# Patient Record
Sex: MALE | Race: WHITE | NOT HISPANIC OR LATINO | Employment: OTHER | URBAN - METROPOLITAN AREA
[De-identification: names, ages, dates, MRNs, and addresses within clinical notes are randomized per-mention and may not be internally consistent; named-entity substitution may affect disease eponyms.]

---

## 2017-01-05 ENCOUNTER — TELEPHONE (OUTPATIENT)
Dept: INTERNAL MEDICINE | Facility: CLINIC | Age: 67
End: 2017-01-05

## 2017-01-05 DIAGNOSIS — E87.5 INCREASED POTASSIUM IN THE BLOOD: Primary | ICD-10-CM

## 2017-01-05 NOTE — TELEPHONE ENCOUNTER
Results given to pt's wife and she said that they would go to the [facility] office for repeat BMP

## 2017-01-23 ENCOUNTER — TELEPHONE (OUTPATIENT)
Dept: INTERNAL MEDICINE | Facility: CLINIC | Age: 67
End: 2017-01-23

## 2017-01-23 NOTE — TELEPHONE ENCOUNTER
Orders are in for repeat BMP   Pt has been sick is the reason he did not go have the repeat done before this time

## 2017-01-23 NOTE — TELEPHONE ENCOUNTER
----- Message from Mary Davies sent at 1/23/2017  1:23 PM CST -----  Please call pt back about setting lab work. Pt got sick and was not able to get it. Please put the labs back in. Pt can be reach at [phone].

## 2017-01-23 NOTE — TELEPHONE ENCOUNTER
----- Message from Mary Davies sent at 1/23/2017  1:23 PM CST -----  Please call pt back about setting lab work. Pt got sick and was not able to get it. Please put the labs back in. Pt can be reach at [phone].

## 2017-01-25 ENCOUNTER — LAB VISIT (OUTPATIENT)
Dept: LAB | Facility: HOSPITAL | Age: 67
End: 2017-01-25
Attending: FAMILY MEDICINE
Payer: MEDICARE

## 2017-01-25 DIAGNOSIS — E87.5 INCREASED POTASSIUM IN THE BLOOD: ICD-10-CM

## 2017-01-25 LAB
ANION GAP SERPL CALC-SCNC: 11 MMOL/L
BUN SERPL-MCNC: 13 MG/DL
CALCIUM SERPL-MCNC: 8.8 MG/DL
CHLORIDE SERPL-SCNC: 106 MMOL/L
CO2 SERPL-SCNC: 19 MMOL/L
CREAT SERPL-MCNC: 1.3 MG/DL
EST. GFR  (AFRICAN AMERICAN): >60 ML/MIN/1.73 M^2
EST. GFR  (NON AFRICAN AMERICAN): 56.1 ML/MIN/1.73 M^2
GLUCOSE SERPL-MCNC: 106 MG/DL
POTASSIUM SERPL-SCNC: 4.1 MMOL/L
SODIUM SERPL-SCNC: 136 MMOL/L

## 2017-01-25 PROCEDURE — 80048 BASIC METABOLIC PNL TOTAL CA: CPT

## 2017-01-25 PROCEDURE — 36415 COLL VENOUS BLD VENIPUNCTURE: CPT | Mod: PO

## 2017-02-23 DIAGNOSIS — I10 ESSENTIAL HYPERTENSION: Chronic | ICD-10-CM

## 2017-02-23 RX ORDER — PRAVASTATIN SODIUM 80 MG/1
80 TABLET ORAL NIGHTLY
Qty: 90 TABLET | Refills: 3 | Status: SHIPPED | OUTPATIENT
Start: 2017-02-23 | End: 2018-03-30 | Stop reason: SDUPTHER

## 2017-02-23 RX ORDER — OMEPRAZOLE 20 MG/1
20 CAPSULE, DELAYED RELEASE ORAL 2 TIMES DAILY
Qty: 90 CAPSULE | Refills: 3 | Status: SHIPPED | OUTPATIENT
Start: 2017-02-23 | End: 2017-10-04 | Stop reason: SDUPTHER

## 2017-02-23 RX ORDER — CLOPIDOGREL BISULFATE 75 MG/1
TABLET ORAL
Qty: 90 TABLET | Refills: 3 | Status: SHIPPED | OUTPATIENT
Start: 2017-02-23 | End: 2018-04-06 | Stop reason: SDUPTHER

## 2017-02-23 RX ORDER — AMLODIPINE BESYLATE 5 MG/1
TABLET ORAL
Qty: 90 TABLET | Refills: 3 | Status: SHIPPED | OUTPATIENT
Start: 2017-02-23 | End: 2017-03-22 | Stop reason: SDUPTHER

## 2017-02-23 NOTE — TELEPHONE ENCOUNTER
----- Message from Emile Helms sent at 2/23/2017 10:54 AM CST -----  Pt is requesting a refill on all medications. ( [facility] )             Please call pt back at [phone]

## 2017-03-08 ENCOUNTER — OFFICE VISIT (OUTPATIENT)
Dept: INTERNAL MEDICINE | Facility: CLINIC | Age: 67
End: 2017-03-08
Payer: MEDICARE

## 2017-03-08 VITALS
BODY MASS INDEX: 28.25 KG/M2 | DIASTOLIC BLOOD PRESSURE: 84 MMHG | SYSTOLIC BLOOD PRESSURE: 150 MMHG | OXYGEN SATURATION: 96 % | TEMPERATURE: 98 F | HEIGHT: 73 IN | WEIGHT: 213.19 LBS | HEART RATE: 88 BPM

## 2017-03-08 DIAGNOSIS — N18.30 CKD (CHRONIC KIDNEY DISEASE) STAGE 3, GFR 30-59 ML/MIN: Chronic | ICD-10-CM

## 2017-03-08 DIAGNOSIS — I10 ESSENTIAL HYPERTENSION: Primary | Chronic | ICD-10-CM

## 2017-03-08 DIAGNOSIS — I73.9 PVD (PERIPHERAL VASCULAR DISEASE): Chronic | ICD-10-CM

## 2017-03-08 DIAGNOSIS — E78.2 MIXED HYPERLIPIDEMIA: ICD-10-CM

## 2017-03-08 DIAGNOSIS — Z89.511 STATUS POST BELOW KNEE AMPUTATION OF RIGHT LOWER EXTREMITY: ICD-10-CM

## 2017-03-08 DIAGNOSIS — Z96.0 URETERAL STENT RETAINED: Chronic | ICD-10-CM

## 2017-03-08 PROCEDURE — 1126F AMNT PAIN NOTED NONE PRSNT: CPT | Mod: S$GLB,,, | Performed by: FAMILY MEDICINE

## 2017-03-08 PROCEDURE — 3077F SYST BP >= 140 MM HG: CPT | Mod: S$GLB,,, | Performed by: FAMILY MEDICINE

## 2017-03-08 PROCEDURE — 3079F DIAST BP 80-89 MM HG: CPT | Mod: S$GLB,,, | Performed by: FAMILY MEDICINE

## 2017-03-08 PROCEDURE — 1160F RVW MEDS BY RX/DR IN RCRD: CPT | Mod: S$GLB,,, | Performed by: FAMILY MEDICINE

## 2017-03-08 PROCEDURE — 99214 OFFICE O/P EST MOD 30 MIN: CPT | Mod: S$GLB,,, | Performed by: FAMILY MEDICINE

## 2017-03-08 PROCEDURE — 1157F ADVNC CARE PLAN IN RCRD: CPT | Mod: S$GLB,,, | Performed by: FAMILY MEDICINE

## 2017-03-08 PROCEDURE — 1159F MED LIST DOCD IN RCRD: CPT | Mod: S$GLB,,, | Performed by: FAMILY MEDICINE

## 2017-03-08 PROCEDURE — 99999 PR PBB SHADOW E&M-EST. PATIENT-LVL III: CPT | Mod: PBBFAC,,, | Performed by: FAMILY MEDICINE

## 2017-03-08 PROCEDURE — 99499 UNLISTED E&M SERVICE: CPT | Mod: S$GLB,,, | Performed by: FAMILY MEDICINE

## 2017-03-08 RX ORDER — LOSARTAN POTASSIUM 50 MG/1
50 TABLET ORAL DAILY
Qty: 90 TABLET | Refills: 3 | Status: SHIPPED | OUTPATIENT
Start: 2017-03-08 | End: 2017-03-22 | Stop reason: SDUPTHER

## 2017-03-08 NOTE — MR AVS SNAPSHOT
[facility]  [address] [id number]  Phone: [phone]                  Kodi Ribeiro   3/8/2017 1:20 PM   Office Visit    Description:  Male : 1950   Provider:  Norma Nuñez MD   Department:  [facility]           Reason for Visit     Rash           Diagnoses this Visit        Comments    Essential hypertension    -  Primary     Mixed hyperlipidemia         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         Toe amputation status, left         Ureteral stent retained         PVD (peripheral vascular disease)         CKD (chronic kidney disease) stage 3, GFR 30-59 ml/min                To Do List           Future Appointments        Provider Department Dept Phone    3/22/2017 10:40 AM Norma Nuñez MD [facility] Internal Medicine [phone]    2017 10:00 AM Scooter Jin IV, MD [facility] - Urology [phone]      Goals (5 Years of Data)     None      Follow-Up and Disposition     Return in about 2 weeks (around 3/22/2017).       These Medications        Disp Refills Start End    losartan (COZAAR) 50 MG tablet 90 tablet 3 3/8/2017 3/8/2018    Take 1 tablet (50 mg total) by mouth once daily. - Oral    [facility] [address] Ph #: [phone]         [facility] On Call     [facility] On Call Nurse Care Line -  Assistance  Registered nurses in the [facility] On Call Center provide clinical advisement, health education, appointment booking, and other advisory services.  Call for this free service at [phone].             Medications           Message regarding Medications     Verify the changes and/or additions to your medication regime listed below are the same as discussed with your clinician today.  If any of these changes or additions are incorrect, please notify your healthcare provider.        START taking these NEW medications        Refills     "losartan (COZAAR) 50 MG tablet 3    Sig: Take 1 tablet (50 mg total) by mouth once daily.    Class: Normal    Route: Oral           Verify that the below list of medications is an accurate representation of the medications you are currently taking.  If none reported, the list may be blank. If incorrect, please contact your healthcare provider. Carry this list with you in case of emergency.           Current Medications     amlodipine (NORVASC) 5 MG tablet TAKE 1 TABLET ONE TIME DAILY    betamethasone dipropionate (DIPROLENE) 0.05 % cream AAA bid as needed.  For rash on forearms.  Do not use on face, underarms or groin area    carvedilol (COREG) 6.25 MG tablet Take 1 tablet (6.25 mg total) by mouth 2 (two) times daily.    clopidogrel (PLAVIX) 75 mg tablet TAKE 1 TABLET ONE TIME DAILY    docusate sodium (COLACE) 100 MG capsule Take 1 capsule (100 mg total) by mouth 2 (two) times daily.    hydrocodone-acetaminophen 7.5-325mg (NORCO) 7.5-325 mg per tablet Take 1 tablet by mouth every 8 (eight) hours as needed for Pain.    omeprazole (PRILOSEC) 20 MG capsule Take 1 capsule (20 mg total) by mouth 2 (two) times daily.    pravastatin (PRAVACHOL) 80 MG tablet Take 1 tablet (80 mg total) by mouth every evening.    triamcinolone acetonide 0.025% (KENALOG) 0.025 % cream Apply topically 2 (two) times daily.    triamcinolone acetonide 0.1% (KENALOG) 0.1 % cream 1 application 2 (two) times daily. Apply to affected area    losartan (COZAAR) 50 MG tablet Take 1 tablet (50 mg total) by mouth once daily.           Clinical Reference Information           Your Vitals Were     BP Pulse Temp Height Weight SpO2    150/84 88 98.4 °F (36.9 °C) (Tympanic) 6' 1" (1.854 m) 96.7 kg (213 lb 3 oz) 96%    BMI                28.13 kg/m2          Blood Pressure          Most Recent Value    BP  (!)  150/84      Allergies as of 3/8/2017     Morphine      Immunizations Administered on Date of Encounter - 3/8/2017     None      Language Assistance " Services     ATTENTION: Language assistance services are available, free of charge. Please call [phone].      ATENCIÓN: Si habla ashvin, tiene a morocho disposición servicios gratuitos de asistencia lingüística. Llame al [phone].     CHÚ Ý: N?u b?n nói Ti?ng Vi?t, có các d?ch v? h? tr? ngôn ng? mi?n phí dành cho b?n. G?i s? [phone].         [facility] complies with applicable Federal civil rights laws and does not discriminate on the basis of race, color, national origin, age, disability, or sex.

## 2017-03-08 NOTE — PROGRESS NOTES
Chief Complaint:    Chief Complaint   Patient presents with    Rash       History of Present Illness:    Will straight for six-month follow-up,  He has hypertension which is running high today no home blood pressure readings available.  Also has some itching on bilateral forearms been using hydrocortisone cream  Hyperlipidemia on pravastatin last labs done in December were normal.  Also has chronic kidney disease stable.    ROS:  Review of Systems   Constitutional: Negative for activity change, chills, fatigue, fever and unexpected weight change.   HENT: Negative for congestion, ear discharge, ear pain, hearing loss, postnasal drip and rhinorrhea.    Eyes: Negative for pain and visual disturbance.   Respiratory: Negative for cough, chest tightness and shortness of breath.    Cardiovascular: Negative for chest pain and palpitations.   Gastrointestinal: Negative for abdominal pain, diarrhea and vomiting.   Endocrine: Negative for heat intolerance.   Genitourinary: Negative for dysuria, flank pain, frequency and hematuria.   Musculoskeletal: Negative for back pain, gait problem and neck pain.   Skin: Negative for color change and rash.   Neurological: Negative for dizziness, tremors, seizures, numbness and headaches.   Psychiatric/Behavioral: Negative for agitation, hallucinations, self-injury, sleep disturbance and suicidal ideas. The patient is not nervous/anxious.        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    Jejunal polyp     VCE 3/7/2014    Lipoma of colon     colonoscopy 2/21/2014    Myocardial infarction     per patient 2000 &  "9/2012    Phantom limb syndrome     patient reports only intermittent not problematic, not worsening    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    Tobacco dependence     resolved    Ulcer of ileum     VCE 3/7/2014    Ureteral stent retained        Social History:  Social History     Social History    Marital status:      Spouse name: Laury    Number of children: 2    Years of education: N/A     Occupational History    Retired       [employer]     Social History Main Topics    Smoking status: Former Smoker     Packs/day: 1.00     Years: 15.00     Quit date: 1/1/2009    Smokeless tobacco: Never Used    Alcohol use No    Drug use: No    Sexual activity: Not Currently     Partners: Female     Other Topics Concern    None     Social History Narrative    None       Family History:   family history includes Cancer in his mother; Diabetes in his daughter; Heart disease in his father. There is no history of Eczema, Lupus, Psoriasis, Melanoma, Kidney disease, or Stroke.    Health Maintenance   Topic Date Due    Lipid Panel  12/29/2017    Colonoscopy  02/21/2019    TETANUS VACCINE  12/04/2024    Hepatitis C Screening  Completed    Zoster Vaccine  Completed    Pneumococcal (65+)  Completed    Influenza Vaccine  Completed    Abdominal Aortic Aneurysm Screening  Addressed       Physical Exam:    Vital Signs  Temp: 98.4 °F (36.9 °C)  Temp src: Tympanic  Pulse: 88  SpO2: 96 %  BP: (!) 150/84  Pain Score: 0-No pain  Height and Weight  Height: 6' 1" (185.4 cm)  Weight: 96.7 kg (213 lb 3 oz)  BSA (Calculated - sq m): 2.23 sq meters  BMI (Calculated): 28.2  Weight in (lb) to have BMI = 25: 189.1]    Body mass index is 28.13 kg/(m^2).    Physical Exam   Constitutional: He is oriented to person, place, and time. He appears well-developed.   HENT:   Mouth/Throat: Oropharynx is clear and moist.   Eyes: Conjunctivae are normal. Pupils are equal, round, and reactive to light. "   Neck: Normal range of motion. Neck supple.   Cardiovascular: Normal rate, regular rhythm and normal heart sounds.    No murmur heard.  Pulmonary/Chest: Effort normal and breath sounds normal. No respiratory distress. He has no wheezes. He has no rales. He exhibits no tenderness.   Abdominal: Soft. He exhibits no distension and no mass. There is no tenderness. There is no guarding.   Musculoskeletal: He exhibits no edema or tenderness.   Lymphadenopathy:     He has no cervical adenopathy.   Neurological: He is alert and oriented to person, place, and time. He has normal reflexes.   Skin: Skin is warm and dry.        Psychiatric: He has a normal mood and affect. His behavior is normal. Judgment and thought content normal.       Lab Results   Component Value Date    CHOL 141 12/29/2016    CHOL 168 12/04/2014    CHOL 178 05/30/2014    TRIG 214 (H) 12/29/2016    TRIG 149 12/04/2014    TRIG 108 05/30/2014    HDL 29 (L) 12/29/2016    HDL 33 (L) 12/04/2014    HDL 41 05/30/2014    TOTALCHOLEST 4.9 12/29/2016    TOTALCHOLEST 5.1 (H) 12/04/2014    TOTALCHOLEST 4.3 05/30/2014    NONHDLCHOL 112 12/29/2016    NONHDLCHOL 135 12/04/2014    NONHDLCHOL 137 05/30/2014       Lab Results   Component Value Date    HGBA1C 5.6 06/23/2016       Assessment:      ICD-10-CM ICD-9-CM   1. Essential hypertension I10 401.9   2. Mixed hyperlipidemia E78.2 272.2   3. 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 Z89.511 V49.75   4. Toe amputation status, left Z89.422 V49.72   5. Ureteral stent retained Z96.0 V43.89   6. PVD (peripheral vascular disease) I73.9 443.9   7. CKD (chronic kidney disease) stage 3, GFR 30-59 ml/min N18.3 585.3         Plan:  Hypertension poor control and add losartan 50 mg once a day continue other medications moderate blood pressure carefully.  Hyperlipidemia stable continue current meds.  Ureteral stent placement will follow-up with Dr. Jin next month for possible stent change.  Chronic kidney disease  stable  Peripheral vascular disease asymptomatic.  Hyperlipidemia stable.  No orders of the defined types were placed in this encounter.      Current Outpatient Prescriptions   Medication Sig Dispense Refill    amlodipine (NORVASC) 5 MG tablet TAKE 1 TABLET ONE TIME DAILY 90 tablet 3    betamethasone dipropionate (DIPROLENE) 0.05 % cream AAA bid as needed.  For rash on forearms.  Do not use on face, underarms or groin area 60 g 3    carvedilol (COREG) 6.25 MG tablet Take 1 tablet (6.25 mg total) by mouth 2 (two) times daily. (Patient taking differently: Take 6.25 mg by mouth 2 (two) times daily. Pt takes 1/2 (6.25 mg) tab in am and 1/2 in pm  ) 180 tablet 3    clopidogrel (PLAVIX) 75 mg tablet TAKE 1 TABLET ONE TIME DAILY 90 tablet 3    docusate sodium (COLACE) 100 MG capsule Take 1 capsule (100 mg total) by mouth 2 (two) times daily. 60 capsule 0    hydrocodone-acetaminophen 7.5-325mg (NORCO) 7.5-325 mg per tablet Take 1 tablet by mouth every 8 (eight) hours as needed for Pain. 21 tablet 0    omeprazole (PRILOSEC) 20 MG capsule Take 1 capsule (20 mg total) by mouth 2 (two) times daily. 90 capsule 3    pravastatin (PRAVACHOL) 80 MG tablet Take 1 tablet (80 mg total) by mouth every evening. 90 tablet 3    triamcinolone acetonide 0.025% (KENALOG) 0.025 % cream Apply topically 2 (two) times daily. 80 g 1    triamcinolone acetonide 0.1% (KENALOG) 0.1 % cream 1 application 2 (two) times daily. Apply to affected area  0    losartan (COZAAR) 50 MG tablet Take 1 tablet (50 mg total) by mouth once daily. 90 tablet 3     No current facility-administered medications for this visit.        There are no discontinued medications.    Return in about 2 weeks (around 3/22/2017).      Dr Norma Nuñez MD    Disclaimer: This note is prepared using voice recognition system and as such is likely to have errors and is not proof read.

## 2017-03-22 ENCOUNTER — OFFICE VISIT (OUTPATIENT)
Dept: INTERNAL MEDICINE | Facility: CLINIC | Age: 67
End: 2017-03-22
Payer: MEDICARE

## 2017-03-22 VITALS
SYSTOLIC BLOOD PRESSURE: 158 MMHG | HEIGHT: 73 IN | TEMPERATURE: 98 F | BODY MASS INDEX: 27.87 KG/M2 | OXYGEN SATURATION: 96 % | WEIGHT: 210.31 LBS | HEART RATE: 82 BPM | DIASTOLIC BLOOD PRESSURE: 78 MMHG

## 2017-03-22 DIAGNOSIS — I10 ESSENTIAL HYPERTENSION: Primary | ICD-10-CM

## 2017-03-22 PROCEDURE — 99213 OFFICE O/P EST LOW 20 MIN: CPT | Mod: S$GLB,,, | Performed by: FAMILY MEDICINE

## 2017-03-22 PROCEDURE — 1160F RVW MEDS BY RX/DR IN RCRD: CPT | Mod: S$GLB,,, | Performed by: FAMILY MEDICINE

## 2017-03-22 PROCEDURE — 3078F DIAST BP <80 MM HG: CPT | Mod: S$GLB,,, | Performed by: FAMILY MEDICINE

## 2017-03-22 PROCEDURE — 1126F AMNT PAIN NOTED NONE PRSNT: CPT | Mod: S$GLB,,, | Performed by: FAMILY MEDICINE

## 2017-03-22 PROCEDURE — 1159F MED LIST DOCD IN RCRD: CPT | Mod: S$GLB,,, | Performed by: FAMILY MEDICINE

## 2017-03-22 PROCEDURE — 99499 UNLISTED E&M SERVICE: CPT | Mod: S$GLB,,, | Performed by: FAMILY MEDICINE

## 2017-03-22 PROCEDURE — 1157F ADVNC CARE PLAN IN RCRD: CPT | Mod: S$GLB,,, | Performed by: FAMILY MEDICINE

## 2017-03-22 PROCEDURE — 99999 PR PBB SHADOW E&M-EST. PATIENT-LVL III: CPT | Mod: PBBFAC,,, | Performed by: FAMILY MEDICINE

## 2017-03-22 PROCEDURE — 3074F SYST BP LT 130 MM HG: CPT | Mod: S$GLB,,, | Performed by: FAMILY MEDICINE

## 2017-03-22 RX ORDER — LOSARTAN POTASSIUM 100 MG/1
100 TABLET ORAL DAILY
Qty: 90 TABLET | Refills: 3
Start: 2017-03-22 | End: 2017-04-21 | Stop reason: SDUPTHER

## 2017-03-22 RX ORDER — AMLODIPINE BESYLATE 10 MG/1
TABLET ORAL
Qty: 30 TABLET | Refills: 6
Start: 2017-03-22 | End: 2017-04-21 | Stop reason: SDUPTHER

## 2017-03-22 NOTE — MR AVS SNAPSHOT
[facility] Medicine  [address] [place] [address]  Phone: [phone]                  Kodi Ribeiro   3/22/2017 10:40 AM   Office Visit    Description:  Male : 1950   Provider:  Norma Nuñez MD   Department:  [facility]           Reason for Visit     Follow-up           Diagnoses this Visit        Comments    Essential hypertension    -  Primary            To Do List           Future Appointments        Provider Department Dept Phone    2017 10:00 AM MD [facility] - Urology [phone]    2017 10:20 AM Norma Nuñez MD [facility] Medicine [phone]      Goals (5 Years of Data)     None      Follow-Up and Disposition     Return in about 1 month (around 2017).    Follow-up and Disposition History       These Medications        Disp Refills Start End    losartan (COZAAR) 100 MG tablet 90 tablet 3 3/22/2017 3/22/2018    Take 1 tablet (100 mg total) by mouth once daily. - Oral    Pharmacy: [address] Ph #: [phone]       amlodipine (NORVASC) 10 MG tablet 30 tablet 6 3/22/2017     TAKE 1 TABLET ONE TIME DAILY    Pharmacy: [facility] Ph #: [phone]         [facility] On Call     [facility] On Call Nurse Care Line -  Assistance  Registered nurses in the [facility] On Call Center provide clinical advisement, health education, appointment booking, and other advisory services.  Call for this free service at [phone].             Medications           Message regarding Medications     Verify the changes and/or additions to your medication regime listed below are the same as discussed with your clinician today.  If any of these changes or additions are incorrect, please notify your healthcare provider.        CHANGE how you are taking these medications     Start Taking Instead of     losartan (COZAAR) 100 MG tablet losartan (COZAAR) 50 MG tablet    Dosage:  Take 1 tablet (100 mg total) by mouth once daily. Dosage:  Take 1 tablet (50 mg total) by mouth once daily.    Reason for Change:  Reorder     amlodipine (NORVASC) 10 MG tablet amlodipine (NORVASC) 5 MG tablet    Dosage:  TAKE 1 TABLET ONE TIME DAILY Dosage:  TAKE 1 TABLET ONE TIME DAILY    Reason for Change:  Reorder            Verify that the below list of medications is an accurate representation of the medications you are currently taking.  If none reported, the list may be blank. If incorrect, please contact your healthcare provider. Carry this list with you in case of emergency.           Current Medications     amlodipine (NORVASC) 10 MG tablet TAKE 1 TABLET ONE TIME DAILY    betamethasone dipropionate (DIPROLENE) 0.05 % cream AAA bid as needed.  For rash on forearms.  Do not use on face, underarms or groin area    carvedilol (COREG) 6.25 MG tablet Take 1 tablet (6.25 mg total) by mouth 2 (two) times daily.    clopidogrel (PLAVIX) 75 mg tablet TAKE 1 TABLET ONE TIME DAILY    docusate sodium (COLACE) 100 MG capsule Take 1 capsule (100 mg total) by mouth 2 (two) times daily.    hydrocodone-acetaminophen 7.5-325mg (NORCO) 7.5-325 mg per tablet Take 1 tablet by mouth every 8 (eight) hours as needed for Pain.    losartan (COZAAR) 100 MG tablet Take 1 tablet (100 mg total) by mouth once daily.    omeprazole (PRILOSEC) 20 MG capsule Take 1 capsule (20 mg total) by mouth 2 (two) times daily.    pravastatin (PRAVACHOL) 80 MG tablet Take 1 tablet (80 mg total) by mouth every evening.    triamcinolone acetonide 0.025% (KENALOG) 0.025 % cream Apply topically 2 (two) times daily.    triamcinolone acetonide 0.1% (KENALOG) 0.1 % cream 1 application 2 (two) times daily. Apply to affected area           Clinical Reference Information           Your Vitals Were     BP Pulse Temp Height    158/78 (BP Location: Right arm, Patient Position: Sitting, BP  "Method: Manual) 82 98.3 °F (36.8 °C) (Tympanic) 6' 1" (1.854 m)    Weight SpO2 BMI    95.4 kg (210 lb 5.1 oz) 96% 27.75 kg/m2      Blood Pressure          Most Recent Value    BP  (!)  158/78      Allergies as of 3/22/2017     Morphine      Immunizations Administered on Date of Encounter - 3/22/2017     None      Language Assistance Services     ATTENTION: Language assistance services are available, free of charge. Please call [phone].      ATENCIÓN: Si habla español, tiene a morocho disposición servicios gratuitos de asistencia lingüística. Llame al [phone].     LINH Ý: N?u b?n nói Ti?ng Vi?t, có các d?ch v? h? tr? ngôn ng? mi?n phí dành cho b?n. G?i s? [phone].         [facility] complies with applicable Federal civil rights laws and does not discriminate on the basis of race, color, national origin, age, disability, or sex.        "

## 2017-03-22 NOTE — PROGRESS NOTES
Chief Complaint:    Chief Complaint   Patient presents with    Follow-up       History of Present Illness:    Patient is a for follow-up of blood pressure  He was started on losartan  Patient says his blood pressure still running high check with his machine and his machine tends to read systolic blood pressure 10 points lower than our machine but still the systolic is 150+ on the left arm.  He is taking Coreg 6.25 mg but he only takes half a pill amlodipine 5 mg and losartan 50 mg.    ROS:  Review of Systems   Constitutional: Negative for activity change, chills, fatigue, fever and unexpected weight change.   HENT: Negative for congestion, ear discharge, ear pain, hearing loss, postnasal drip and rhinorrhea.    Eyes: Negative for pain and visual disturbance.   Respiratory: Negative for cough, chest tightness and shortness of breath.    Cardiovascular: Negative for chest pain and palpitations.   Gastrointestinal: Negative for abdominal pain, diarrhea and vomiting.   Endocrine: Negative for heat intolerance.   Genitourinary: Negative for dysuria, flank pain, frequency and hematuria.   Musculoskeletal: Negative for back pain, gait problem and neck pain.   Skin: Negative for color change and rash.   Neurological: Negative for dizziness, tremors, seizures, numbness and headaches.   Psychiatric/Behavioral: Negative for agitation, hallucinations, self-injury, sleep disturbance and suicidal ideas. The patient is not nervous/anxious.        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    Jejunal polyp     VCE 3/7/2014    Lipoma of colon      "colonoscopy 2/21/2014    Myocardial infarction     per patient 2000 & 9/2012    Phantom limb syndrome     patient reports only intermittent not problematic, not worsening    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    Tobacco dependence     resolved    Ulcer of ileum     VCE 3/7/2014    Ureteral stent retained        Social History:  Social History     Social History    Marital status:      Spouse name: Laury    Number of children: 2    Years of education: N/A     Occupational History    Retired       [employer]     Social History Main Topics    Smoking status: Former Smoker     Packs/day: 1.00     Years: 15.00     Quit date: 1/1/2009    Smokeless tobacco: Never Used    Alcohol use No    Drug use: No    Sexual activity: Not Currently     Partners: Female     Other Topics Concern    None     Social History Narrative       Family History:   family history includes Cancer in his mother; Diabetes in his daughter; Heart disease in his father. There is no history of Eczema, Lupus, Psoriasis, Melanoma, Kidney disease, or Stroke.    Health Maintenance   Topic Date Due    Lipid Panel  12/29/2017    Colonoscopy  02/21/2019    TETANUS VACCINE  12/04/2024    Hepatitis C Screening  Completed    Zoster Vaccine  Completed    Pneumococcal (65+)  Completed    Influenza Vaccine  Completed    Abdominal Aortic Aneurysm Screening  Addressed       Physical Exam:    Vital Signs  Temp: 98.3 °F (36.8 °C)  Temp src: Tympanic  Pulse: 82  SpO2: 96 %  BP: (!) 158/78  BP Location: Right arm  BP Method: Manual  Patient Position: Sitting  Pain Score: 0-No pain  Height and Weight  Height: 6' 1" (185.4 cm)  Weight: 95.4 kg (210 lb 5.1 oz)  BSA (Calculated - sq m): 2.22 sq meters  BMI (Calculated): 27.8  Weight in (lb) to have BMI = 25: 189.1]    Body mass index is 27.75 kg/(m^2).    Physical Exam   Constitutional: He appears well-developed.   Eyes: Pupils are equal, round, and reactive to " light.   Cardiovascular: Normal rate.    Pulmonary/Chest: Effort normal.   Abdominal: Soft.   Skin: Skin is warm.       Lab Results   Component Value Date    CHOL 141 12/29/2016    CHOL 168 12/04/2014    CHOL 178 05/30/2014    TRIG 214 (H) 12/29/2016    TRIG 149 12/04/2014    TRIG 108 05/30/2014    HDL 29 (L) 12/29/2016    HDL 33 (L) 12/04/2014    HDL 41 05/30/2014    TOTALCHOLEST 4.9 12/29/2016    TOTALCHOLEST 5.1 (H) 12/04/2014    TOTALCHOLEST 4.3 05/30/2014    NONHDLCHOL 112 12/29/2016    NONHDLCHOL 135 12/04/2014    NONHDLCHOL 137 05/30/2014       Lab Results   Component Value Date    HGBA1C 5.6 06/23/2016       Assessment:      ICD-10-CM ICD-9-CM   1. Essential hypertension I10 401.9         Plan:  We'll increase the losartan to 100 mg, amlodipine to 10 mg and Coreg to 6.25 twice a day.  Please continue to monitor blood pressure readings and follow up with numbers.  No orders of the defined types were placed in this encounter.      Current Outpatient Prescriptions   Medication Sig Dispense Refill    amlodipine (NORVASC) 10 MG tablet TAKE 1 TABLET ONE TIME DAILY 30 tablet 6    betamethasone dipropionate (DIPROLENE) 0.05 % cream AAA bid as needed.  For rash on forearms.  Do not use on face, underarms or groin area 60 g 3    carvedilol (COREG) 6.25 MG tablet Take 1 tablet (6.25 mg total) by mouth 2 (two) times daily. (Patient taking differently: Take 6.25 mg by mouth 2 (two) times daily. Pt takes 1/2 (6.25 mg) tab in am and 1/2 in pm  ) 180 tablet 3    clopidogrel (PLAVIX) 75 mg tablet TAKE 1 TABLET ONE TIME DAILY 90 tablet 3    docusate sodium (COLACE) 100 MG capsule Take 1 capsule (100 mg total) by mouth 2 (two) times daily. 60 capsule 0    hydrocodone-acetaminophen 7.5-325mg (NORCO) 7.5-325 mg per tablet Take 1 tablet by mouth every 8 (eight) hours as needed for Pain. 21 tablet 0    losartan (COZAAR) 100 MG tablet Take 1 tablet (100 mg total) by mouth once daily. 90 tablet 3    omeprazole (PRILOSEC) 20  MG capsule Take 1 capsule (20 mg total) by mouth 2 (two) times daily. 90 capsule 3    pravastatin (PRAVACHOL) 80 MG tablet Take 1 tablet (80 mg total) by mouth every evening. 90 tablet 3    triamcinolone acetonide 0.025% (KENALOG) 0.025 % cream Apply topically 2 (two) times daily. 80 g 1    triamcinolone acetonide 0.1% (KENALOG) 0.1 % cream 1 application 2 (two) times daily. Apply to affected area  0     No current facility-administered medications for this visit.        Medications Discontinued During This Encounter   Medication Reason    losartan (COZAAR) 50 MG tablet Reorder    amlodipine (NORVASC) 5 MG tablet Reorder       Return in about 1 month (around 4/22/2017).      Dr Norma Nuñez MD    Disclaimer: This note is prepared using voice recognition system and as such is likely to have errors and is not proof read.

## 2017-04-21 ENCOUNTER — OFFICE VISIT (OUTPATIENT)
Dept: INTERNAL MEDICINE | Facility: CLINIC | Age: 67
End: 2017-04-21
Payer: MEDICARE

## 2017-04-21 VITALS
WEIGHT: 211.44 LBS | OXYGEN SATURATION: 97 % | SYSTOLIC BLOOD PRESSURE: 112 MMHG | BODY MASS INDEX: 28.02 KG/M2 | HEART RATE: 72 BPM | TEMPERATURE: 98 F | HEIGHT: 73 IN | DIASTOLIC BLOOD PRESSURE: 60 MMHG

## 2017-04-21 DIAGNOSIS — I10 ESSENTIAL HYPERTENSION: Primary | Chronic | ICD-10-CM

## 2017-04-21 PROCEDURE — 3074F SYST BP LT 130 MM HG: CPT | Mod: S$GLB,,, | Performed by: FAMILY MEDICINE

## 2017-04-21 PROCEDURE — 1160F RVW MEDS BY RX/DR IN RCRD: CPT | Mod: S$GLB,,, | Performed by: FAMILY MEDICINE

## 2017-04-21 PROCEDURE — 99999 PR PBB SHADOW E&M-EST. PATIENT-LVL III: CPT | Mod: PBBFAC,,, | Performed by: FAMILY MEDICINE

## 2017-04-21 PROCEDURE — 99499 UNLISTED E&M SERVICE: CPT | Mod: S$GLB,,, | Performed by: FAMILY MEDICINE

## 2017-04-21 PROCEDURE — 1126F AMNT PAIN NOTED NONE PRSNT: CPT | Mod: S$GLB,,, | Performed by: FAMILY MEDICINE

## 2017-04-21 PROCEDURE — 3078F DIAST BP <80 MM HG: CPT | Mod: S$GLB,,, | Performed by: FAMILY MEDICINE

## 2017-04-21 PROCEDURE — 1157F ADVNC CARE PLAN IN RCRD: CPT | Mod: S$GLB,,, | Performed by: FAMILY MEDICINE

## 2017-04-21 PROCEDURE — 1159F MED LIST DOCD IN RCRD: CPT | Mod: S$GLB,,, | Performed by: FAMILY MEDICINE

## 2017-04-21 PROCEDURE — 99213 OFFICE O/P EST LOW 20 MIN: CPT | Mod: S$GLB,,, | Performed by: FAMILY MEDICINE

## 2017-04-21 RX ORDER — CARVEDILOL 6.25 MG/1
6.25 TABLET ORAL 2 TIMES DAILY
Qty: 180 TABLET | Refills: 3 | Status: SHIPPED | OUTPATIENT
Start: 2017-04-21 | End: 2018-05-31 | Stop reason: SDUPTHER

## 2017-04-21 RX ORDER — AMLODIPINE BESYLATE 10 MG/1
TABLET ORAL
Qty: 90 TABLET | Refills: 3 | Status: SHIPPED | OUTPATIENT
Start: 2017-04-21 | End: 2018-05-10 | Stop reason: SDUPTHER

## 2017-04-21 RX ORDER — LOSARTAN POTASSIUM 100 MG/1
100 TABLET ORAL DAILY
Qty: 90 TABLET | Refills: 3 | Status: SHIPPED | OUTPATIENT
Start: 2017-04-21 | End: 2018-05-10 | Stop reason: SDUPTHER

## 2017-04-21 NOTE — PROGRESS NOTES
Chief Complaint:    Chief Complaint   Patient presents with    Follow-up       History of Present Illness:    Is here for follow-up of his blood pressure readings.  We admitted some medicine changes last time.  He brings his home readings, blood pressure is for the most part systolic 130/63.  No side effects to the medication.    ROS:  Review of Systems   Constitutional: Negative for activity change, chills, fatigue, fever and unexpected weight change.   HENT: Negative for congestion, ear discharge, ear pain, hearing loss, postnasal drip and rhinorrhea.    Eyes: Negative for pain and visual disturbance.   Respiratory: Negative for cough, chest tightness and shortness of breath.    Cardiovascular: Negative for chest pain and palpitations.   Gastrointestinal: Negative for abdominal pain, diarrhea and vomiting.   Endocrine: Negative for heat intolerance.   Genitourinary: Negative for dysuria, flank pain, frequency and hematuria.   Musculoskeletal: Negative for back pain, gait problem and neck pain.   Skin: Negative for color change and rash.   Neurological: Negative for dizziness, tremors, seizures, numbness and headaches.   Psychiatric/Behavioral: Negative for agitation, hallucinations, self-injury, sleep disturbance and suicidal ideas. The patient is not nervous/anxious.        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    Jejunal polyp     VCE 3/7/2014    Lipoma of colon     colonoscopy 2/21/2014    Myocardial infarction     per patient 2000 & 9/2012    Phantom limb syndrome     patient reports only intermittent not problematic, not  "worsening    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    Tobacco dependence     resolved    Ulcer of ileum     VCE 3/7/2014    Ureteral stent retained        Social History:  Social History     Social History    Marital status:      Spouse name: Laury    Number of children: 2    Years of education: N/A     Occupational History    Retired       [employer]     Social History Main Topics    Smoking status: Former Smoker     Packs/day: 1.00     Years: 15.00     Quit date: 1/1/2009    Smokeless tobacco: Never Used    Alcohol use No    Drug use: No    Sexual activity: Not Currently     Partners: Female     Other Topics Concern    None     Social History Narrative       Family History:   family history includes Cancer in his mother; Diabetes in his daughter; Heart disease in his father. There is no history of Eczema, Lupus, Psoriasis, Melanoma, Kidney disease, or Stroke.    Health Maintenance   Topic Date Due    Lipid Panel  12/29/2017    Colonoscopy  02/21/2019    TETANUS VACCINE  12/04/2024    Hepatitis C Screening  Completed    Zoster Vaccine  Completed    Pneumococcal (65+)  Completed    Influenza Vaccine  Completed    Abdominal Aortic Aneurysm Screening  Addressed       Physical Exam:    Vital Signs  Temp: 97.6 °F (36.4 °C)  Temp src: Tympanic  Pulse: 72  SpO2: 97 %  BP: 112/60  BP Location: Left arm  BP Method: Manual  Patient Position: Sitting  Pain Score: 0-No pain  Height and Weight  Height: 6' 1" (185.4 cm)  Weight: 95.9 kg (211 lb 6.7 oz)  BSA (Calculated - sq m): 2.22 sq meters  BMI (Calculated): 28  Weight in (lb) to have BMI = 25: 189.1]    Body mass index is 27.89 kg/(m^2).    Physical Exam   Constitutional: He appears well-developed.   Cardiovascular: Normal rate and regular rhythm.    Abdominal: Soft.       Lab Results   Component Value Date    CHOL 141 12/29/2016    CHOL 168 12/04/2014    CHOL 178 05/30/2014    TRIG 214 (H) 12/29/2016    TRIG 149 " 12/04/2014    TRIG 108 05/30/2014    HDL 29 (L) 12/29/2016    HDL 33 (L) 12/04/2014    HDL 41 05/30/2014    TOTALCHOLEST 4.9 12/29/2016    TOTALCHOLEST 5.1 (H) 12/04/2014    TOTALCHOLEST 4.3 05/30/2014    NONHDLCHOL 112 12/29/2016    NONHDLCHOL 135 12/04/2014    NONHDLCHOL 137 05/30/2014       Lab Results   Component Value Date    HGBA1C 5.6 06/23/2016       Assessment:      ICD-10-CM ICD-9-CM   1. Essential hypertension I10 401.9         Plan:  Good response to the medication.  Please continue current plan medicine sent to mail order  No orders of the defined types were placed in this encounter.      Current Outpatient Prescriptions   Medication Sig Dispense Refill    amlodipine (NORVASC) 10 MG tablet TAKE 1 TABLET ONE TIME DAILY 90 tablet 3    betamethasone dipropionate (DIPROLENE) 0.05 % cream AAA bid as needed.  For rash on forearms.  Do not use on face, underarms or groin area 60 g 3    carvedilol (COREG) 6.25 MG tablet Take 1 tablet (6.25 mg total) by mouth 2 (two) times daily. 180 tablet 3    clopidogrel (PLAVIX) 75 mg tablet TAKE 1 TABLET ONE TIME DAILY 90 tablet 3    docusate sodium (COLACE) 100 MG capsule Take 1 capsule (100 mg total) by mouth 2 (two) times daily. 60 capsule 0    hydrocodone-acetaminophen 7.5-325mg (NORCO) 7.5-325 mg per tablet Take 1 tablet by mouth every 8 (eight) hours as needed for Pain. 21 tablet 0    losartan (COZAAR) 100 MG tablet Take 1 tablet (100 mg total) by mouth once daily. 90 tablet 3    omeprazole (PRILOSEC) 20 MG capsule Take 1 capsule (20 mg total) by mouth 2 (two) times daily. 90 capsule 3    pravastatin (PRAVACHOL) 80 MG tablet Take 1 tablet (80 mg total) by mouth every evening. 90 tablet 3    triamcinolone acetonide 0.025% (KENALOG) 0.025 % cream Apply topically 2 (two) times daily. 80 g 1    triamcinolone acetonide 0.1% (KENALOG) 0.1 % cream 1 application 2 (two) times daily. Apply to affected area  0     No current facility-administered medications for this  visit.        Medications Discontinued During This Encounter   Medication Reason    amlodipine (NORVASC) 10 MG tablet Reorder    losartan (COZAAR) 100 MG tablet Reorder    carvedilol (COREG) 6.25 MG tablet Reorder       Return in about 6 months (around 10/21/2017).      Dr Norma Nuñez MD    Disclaimer: This note is prepared using voice recognition system and as such is likely to have errors and is not proof read.

## 2017-04-24 ENCOUNTER — HOSPITAL ENCOUNTER (OUTPATIENT)
Dept: RADIOLOGY | Facility: HOSPITAL | Age: 67
Discharge: HOME | End: 2017-04-24
Attending: UROLOGY
Payer: MEDICARE

## 2017-04-24 ENCOUNTER — TELEPHONE (OUTPATIENT)
Dept: INTERNAL MEDICINE | Facility: CLINIC | Age: 67
End: 2017-04-24

## 2017-04-24 ENCOUNTER — OFFICE VISIT (OUTPATIENT)
Dept: UROLOGY | Facility: CLINIC | Age: 67
End: 2017-04-24
Payer: MEDICARE

## 2017-04-24 VITALS
SYSTOLIC BLOOD PRESSURE: 120 MMHG | BODY MASS INDEX: 27.65 KG/M2 | WEIGHT: 209.56 LBS | DIASTOLIC BLOOD PRESSURE: 69 MMHG | HEART RATE: 80 BPM

## 2017-04-24 DIAGNOSIS — N13.5 URETERAL STRICTURE, LEFT: Primary | ICD-10-CM

## 2017-04-24 DIAGNOSIS — I25.10 CORONARY ARTERY DISEASE, ANGINA PRESENCE UNSPECIFIED, UNSPECIFIED VESSEL OR LESION TYPE, UNSPECIFIED WHETHER NATIVE OR TRANSPLANTED HEART: Primary | ICD-10-CM

## 2017-04-24 PROCEDURE — 99499 UNLISTED E&M SERVICE: CPT | Mod: S$GLB,,, | Performed by: UROLOGY

## 2017-04-24 PROCEDURE — 71020 XR CHEST PA AND LATERAL: CPT | Mod: 26,,, | Performed by: RADIOLOGY

## 2017-04-24 PROCEDURE — 3074F SYST BP LT 130 MM HG: CPT | Mod: S$GLB,,, | Performed by: UROLOGY

## 2017-04-24 PROCEDURE — 3078F DIAST BP <80 MM HG: CPT | Mod: S$GLB,,, | Performed by: UROLOGY

## 2017-04-24 PROCEDURE — 99999 PR PBB SHADOW E&M-EST. PATIENT-LVL IV: CPT | Mod: PBBFAC,,, | Performed by: UROLOGY

## 2017-04-24 PROCEDURE — 1160F RVW MEDS BY RX/DR IN RCRD: CPT | Mod: S$GLB,,, | Performed by: UROLOGY

## 2017-04-24 PROCEDURE — 99214 OFFICE O/P EST MOD 30 MIN: CPT | Mod: S$GLB,,, | Performed by: UROLOGY

## 2017-04-24 PROCEDURE — 1159F MED LIST DOCD IN RCRD: CPT | Mod: S$GLB,,, | Performed by: UROLOGY

## 2017-04-24 PROCEDURE — 93000 ELECTROCARDIOGRAM COMPLETE: CPT | Mod: S$GLB,,, | Performed by: NUCLEAR MEDICINE

## 2017-04-24 PROCEDURE — 1126F AMNT PAIN NOTED NONE PRSNT: CPT | Mod: S$GLB,,, | Performed by: UROLOGY

## 2017-04-24 PROCEDURE — 71020 XR CHEST PA AND LATERAL: CPT | Mod: TC

## 2017-04-24 NOTE — PROGRESS NOTES
Chief Complaint: Left ureteral stricture with stent    HPI:   4/24/17: Due for left stent change, will arrange.  4/22/16: No changes in last year feeling very well.  Here to replace left stent.  Says he is doing great.  4/22/15: Doing fine with stent change. Stent had no problems after a year; RTC 3/1/16 to plan for next change.   3/19/15: Pt had his stent replaced 1/2014 but has not been back since.  He had major surgery for bowel abscess in the months after he saw me with a lot of vascular reconstruction.  Here to discuss replacement of his left ureteral stent.  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  1/17/14: Pt returns for f/u.  Recent CT shows stent still in place with no urolithiasis.  He has had a fem-fem bypass and his legs are working much better; he felt so good.    10/2012: Pt was found to have left ureteral stricture secondary to AAA repair. A stent was placed in May and then replaced by me last week. He has a long stricture of the middle ureter, such that a reimplant would require a psoas hitch/boari flap reaching to the upper ureter. This is complicated by the patient having a horseshoe kidney so nephropexy with proximal mobilization would be impractical. He is tolerating the stent well and is practically unaware of its presence.  >>> he was to have Mag3 and  f/u from this appt in 5 mo for stent replacement but did not return, Mag3 showed improved drainage with stent..... <<<  9/2012: About 3 months ago pt had severe left kidney infection and had an MI during the same period. He was diagnosed as having a left ureteral obstruction at the level of the iliac crest. A left ureteral stent was placed. He had a Mag3 study prior to stent placement that showed normal uptake bilaterally, normal right and delayed left excretion (44 min). Recent MI caused some damage but no further stenting or CABG indicated now. No history of kidney stones. Had a full body scan in 2008 that showed left hydroureter and  had a stent placed at that time and it was removed 8 months later with no plan for followup. Current stent has been in place for two months. No LUTS. Pain 0/10. Had an episode of gross hematuria one time last week.    Allergies:  Morphine    Medications:  has a current medication list which includes the following prescription(s): amlodipine, betamethasone dipropionate, carvedilol, clopidogrel, docusate sodium, hydrocodone-acetaminophen 7.5-325mg, losartan, omeprazole, pravastatin, and triamcinolone acetonide 0.1%.    Review of Systems:  General: No fever, chills, fatigability, or weight loss.  Skin: No rashes, itching, or changes in color or texture of skin.  Chest: Denies MACKEY, cyanosis, wheezing, cough, and sputum production.  Abdomen: Appetite fine. No weight loss. Denies diarrhea, abdominal pain, hematemesis, or blood in stool.  Musculoskeletal: No joint stiffness or swelling. Denies back pain.  : As above.  All other review of systems negative.    PMH:   has a past medical history of Anemia; Colon polyp; Colon polyp (6/22/2015); Diverticulosis; Encounter for blood transfusion; GERD (gastroesophageal reflux disease); Hemorrhoids; Horseshoe kidney; Hyperglycemia (3/17/2014); Infection of aortic graft (3/14/2014); Jejunal polyp; Lipoma of colon; Myocardial infarction; Phantom limb syndrome; S/P aorto-bifemoral bypass surgery (3/17/2014); Tobacco dependence; Ulcer of ileum; and Ureteral stent retained.    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    FamHx: family history includes Cancer in his mother;  Diabetes in his daughter; Heart disease in his father. There is no history of Eczema, Lupus, Psoriasis, Melanoma, Kidney disease, or Stroke.    SocHx:  reports that he quit smoking about 8 years ago. He has a 15.00 pack-year smoking history. He has never used smokeless tobacco. He reports that he does not drink alcohol or use illicit drugs.      Physical Exam:  Vitals:    04/24/17 1001   BP: 120/69   Pulse: 80     General: A&Ox3, no apparent distress, no deformities  Neck: No masses, normal thyroid  Abdomen: Soft, NT, ND  Skin: The skin is warm and dry. No jaundice.  Ext: No c/c/e.  : deferred    Labs/Studies:   PSA    11/13: 0.5    12/14: 0.4  Urinalysis performed in clinic, summary: UA normal    Impression/Plan:   1. Doing well with stent and stent did well for over a year; will replace on one year intervals.  Will schedule for next week.

## 2017-04-24 NOTE — TELEPHONE ENCOUNTER
He has 3 stents in his heart and he has been on it for greater than 5 years , he says that Dr Boone is his cardiologist now but he did not start him on the medication

## 2017-04-24 NOTE — TELEPHONE ENCOUNTER
----- Message from Josie Malhotra LPN sent at 4/24/2017 10:35 AM CDT -----  Good morning!   Dr. Jin has scheduled patient for cystoscopy with stent placement on 5/4/17. He is currently on Plavix 75mg and will need surgery clearance from Dr. Nuñez stating when he may discontinue the blood thinner prior to surgery. He last saw Dr. Nuñez on 4/21/17. Could Dr. Nuñez addend his note on 4/21/17 or make a new note if he feels patient is a good candidate for surgery?   Thank you for your help,  CORBIN Galindo

## 2017-04-24 NOTE — TELEPHONE ENCOUNTER
Please call patient to find out who put him on Plavix and how long he has been on it.  I do need to know the answer soon because he is having surgery.

## 2017-04-24 NOTE — MR AVS SNAPSHOT
[facility]  [address] [address]  Phone: [phone]  Fax: [phone]                  Kodi Ribeiro   2017 10:00 AM   Office Visit    Description:  Male : 1950   Provider:  Scooter Jin IV, MD   Department:  [facility]           Diagnoses this Visit        Comments    Ureteral stricture, left    -  Primary            To Do List           Future Appointments        Provider Department Dept Phone    2017 10:30 AM EKG, [facility] CARDIO [facility] [phone]    2017 11:00 AM LAB, SAME DAY [facility] [phone]    2017 11:45 AM ONLH XR1- [facility] [phone]    2017 1:00 PM Scooter Jin IV, MD [facility] [phone]    10/23/2017 10:20 AM Norma Nuñez MD [facility] [phone]      Your Future Surgeries/Procedures     May 04, 2017   Surgery with Scooter Jin IV, MD   [facility]    [address] [address] [phone]              Goals (5 Years of Data)     None      [facility]     [facility] Nurse Care Line -  Assistance  Unless otherwise directed by your provider, please contact [facility], our nurse care line that is available for  assistance.     Registered nurses in the [facility] provide: appointment scheduling, clinical advisement, health education, and other advisory services.  Call: [phone] (toll free)               Medications           Message regarding Medications     Verify the changes and/or additions to your medication regime listed below are the same as discussed with your clinician today.  If any of these changes or additions are incorrect, please notify your healthcare provider.        STOP taking these medications     docusate sodium (COLACE) 100 MG capsule Take 1 capsule (100 mg  total) by mouth 2 (two) times daily.    hydrocodone-acetaminophen 7.5-325mg (NORCO) 7.5-325 mg per tablet Take 1 tablet by mouth every 8 (eight) hours as needed for Pain.           Verify that the below list of medications is an accurate representation of the medications you are currently taking.  If none reported, the list may be blank. If incorrect, please contact your healthcare provider. Carry this list with you in case of emergency.           Current Medications     amlodipine (NORVASC) 10 MG tablet TAKE 1 TABLET ONE TIME DAILY    betamethasone dipropionate (DIPROLENE) 0.05 % cream AAA bid as needed.  For rash on forearms.  Do not use on face, underarms or groin area    carvedilol (COREG) 6.25 MG tablet Take 1 tablet (6.25 mg total) by mouth 2 (two) times daily.    clopidogrel (PLAVIX) 75 mg tablet TAKE 1 TABLET ONE TIME DAILY    losartan (COZAAR) 100 MG tablet Take 1 tablet (100 mg total) by mouth once daily.    omeprazole (PRILOSEC) 20 MG capsule Take 1 capsule (20 mg total) by mouth 2 (two) times daily.    pravastatin (PRAVACHOL) 80 MG tablet Take 1 tablet (80 mg total) by mouth every evening.    triamcinolone acetonide 0.1% (KENALOG) 0.1 % cream 1 application 2 (two) times daily. Apply to affected area           Clinical Reference Information           Your Vitals Were     BP Pulse Weight BMI       120/69 (BP Location: Right arm, Patient Position: Sitting, BP Method: Automatic) 80 95.1 kg (209 lb 8.8 oz) 27.65 kg/m2       Blood Pressure          Most Recent Value    BP  120/69      Allergies as of 4/24/2017     Morphine      Immunizations Administered on Date of Encounter - 4/24/2017     None      Orders Placed During Today's Visit      Normal Orders This Visit    Case Request Operating Room: CYSTOSCOPY WITH STENT PLACEMENT     Future Labs/Procedures Expected by Expires    Basic metabolic panel  4/24/2017 6/23/2018    CBC auto differential  4/24/2017 6/23/2018      Language Assistance Services      ATTENTION: Language assistance services are available, free of charge. Please call [phone].      ATENCIÓN: Si habla vamshiañol, tiene a morocho disposición servicios gratuitos de asistencia lingüística. Llame al [phone].     CHÚ Ý: N?u b?n nói Ti?ng Vi?t, có các d?ch v? h? tr? ngôn ng? mi?n phí dành cho b?n. G?i s? [phone].         [facility] complies with applicable Federal civil rights laws and does not discriminate on the basis of race, color, national origin, age, disability, or sex.

## 2017-04-25 NOTE — TELEPHONE ENCOUNTER
Norma    I cannot give any formal recommendations on this patient as he is no longer under my care as he has not been seen in > 3 years.    Adriel

## 2017-04-25 NOTE — TELEPHONE ENCOUNTER
He still says you are his cardiologist. I mino ask him to see you, this will probably delay his surgery though.  Thanks.  rosy Gomes;  Please let pt know that he needs to see Dr. Boone before he will give his formal recommendations about plavix.  Thanks

## 2017-04-26 NOTE — TELEPHONE ENCOUNTER
----- Message from Italia Mario sent at 4/26/2017  1:52 PM CDT -----  Contact: self  Returning your call. Please call back at  [phone]

## 2017-04-27 ENCOUNTER — OFFICE VISIT (OUTPATIENT)
Dept: CARDIOLOGY | Facility: CLINIC | Age: 67
End: 2017-04-27
Payer: MEDICARE

## 2017-04-27 VITALS
HEIGHT: 73 IN | WEIGHT: 209.44 LBS | HEART RATE: 76 BPM | BODY MASS INDEX: 27.76 KG/M2 | DIASTOLIC BLOOD PRESSURE: 70 MMHG | SYSTOLIC BLOOD PRESSURE: 146 MMHG

## 2017-04-27 DIAGNOSIS — E78.2 MIXED HYPERLIPIDEMIA: Chronic | ICD-10-CM

## 2017-04-27 DIAGNOSIS — I10 ESSENTIAL HYPERTENSION: Chronic | ICD-10-CM

## 2017-04-27 DIAGNOSIS — I73.9 PVD (PERIPHERAL VASCULAR DISEASE): Chronic | ICD-10-CM

## 2017-04-27 DIAGNOSIS — I25.2 OLD MI (MYOCARDIAL INFARCTION): Chronic | ICD-10-CM

## 2017-04-27 DIAGNOSIS — Z01.810 PREOP CARDIOVASCULAR EXAM: ICD-10-CM

## 2017-04-27 DIAGNOSIS — I25.10 CORONARY ARTERY DISEASE INVOLVING NATIVE CORONARY ARTERY OF NATIVE HEART WITHOUT ANGINA PECTORIS: Primary | Chronic | ICD-10-CM

## 2017-04-27 PROCEDURE — 99499 UNLISTED E&M SERVICE: CPT | Mod: S$GLB,,, | Performed by: INTERNAL MEDICINE

## 2017-04-27 PROCEDURE — 3078F DIAST BP <80 MM HG: CPT | Mod: S$GLB,,, | Performed by: INTERNAL MEDICINE

## 2017-04-27 PROCEDURE — 1126F AMNT PAIN NOTED NONE PRSNT: CPT | Mod: S$GLB,,, | Performed by: INTERNAL MEDICINE

## 2017-04-27 PROCEDURE — 99204 OFFICE O/P NEW MOD 45 MIN: CPT | Mod: S$GLB,,, | Performed by: INTERNAL MEDICINE

## 2017-04-27 PROCEDURE — 99999 PR PBB SHADOW E&M-EST. PATIENT-LVL III: CPT | Mod: PBBFAC,,, | Performed by: INTERNAL MEDICINE

## 2017-04-27 PROCEDURE — 1159F MED LIST DOCD IN RCRD: CPT | Mod: S$GLB,,, | Performed by: INTERNAL MEDICINE

## 2017-04-27 PROCEDURE — 3077F SYST BP >= 140 MM HG: CPT | Mod: S$GLB,,, | Performed by: INTERNAL MEDICINE

## 2017-04-27 PROCEDURE — 1160F RVW MEDS BY RX/DR IN RCRD: CPT | Mod: S$GLB,,, | Performed by: INTERNAL MEDICINE

## 2017-04-27 NOTE — MR AVS SNAPSHOT
[facility] - Cardiology  [address] [facility] [address]  [facility] [address]  Phone: [phone]  Fax: [phone]                  Kodi ELIAS Gema   2017 11:00 AM   Office Visit    Description:  Male : 1950   Provider:  Edenilson Beckman MD   Department:  [facility] - Cardiology           Reason for Visit     Pre-op Exam     Hypertension     Peripheral Vascular Disease           Diagnoses this Visit        Comments    Coronary artery disease involving native coronary artery of native heart without angina pectoris    -  Primary     Preop cardiovascular exam         Essential hypertension         PVD (peripheral vascular disease)         Old MI (myocardial infarction)         Mixed hyperlipidemia                To Do List           Future Appointments        Provider Department Dept Phone    2017 1:00 PM Scooter Jin IV, MD [facility] Urology [phone]    10/23/2017 10:20 AM Norma Nuñez MD [facility] [phone]      Your Future Surgeries/Procedures     May 04, 2017   Surgery with Scooter Jin IV, MD   [facility]  ([facility])    [id number] [place] [id number] [phone]              Goals (5 Years of Data)     None      Follow-Up and Disposition     Return in about 6 months (around 10/27/2017), or if symptoms worsen or fail to improve.      [facility]     [facility] Nurse Care Line - 24 Assistance  Unless otherwise directed by your provider, please contact [facility], our nurse care line that is available for  assistance.     Registered nurses in the [facility] provide: appointment scheduling, clinical advisement, health education, and other advisory services.  Call: [phone] (toll free)               Medications           Message regarding Medications     Verify the changes and/or additions to your medication regime listed below are the same as discussed with your clinician today.  If  "any of these changes or additions are incorrect, please notify your healthcare provider.             Verify that the below list of medications is an accurate representation of the medications you are currently taking.  If none reported, the list may be blank. If incorrect, please contact your healthcare provider. Carry this list with you in case of emergency.           Current Medications     amlodipine (NORVASC) 10 MG tablet TAKE 1 TABLET ONE TIME DAILY    betamethasone dipropionate (DIPROLENE) 0.05 % cream AAA bid as needed.  For rash on forearms.  Do not use on face, underarms or groin area    carvedilol (COREG) 6.25 MG tablet Take 1 tablet (6.25 mg total) by mouth 2 (two) times daily.    clopidogrel (PLAVIX) 75 mg tablet TAKE 1 TABLET ONE TIME DAILY    losartan (COZAAR) 100 MG tablet Take 1 tablet (100 mg total) by mouth once daily.    omeprazole (PRILOSEC) 20 MG capsule Take 1 capsule (20 mg total) by mouth 2 (two) times daily.    pravastatin (PRAVACHOL) 80 MG tablet Take 1 tablet (80 mg total) by mouth every evening.    triamcinolone acetonide 0.1% (KENALOG) 0.1 % cream 1 application 2 (two) times daily. Apply to affected area           Clinical Reference Information           Your Vitals Were     BP Pulse Height Weight BMI    146/70 (BP Location: Left arm, Patient Position: Sitting, BP Method: Manual) 76 6' 1" (1.854 m) 95 kg (209 lb 7 oz) 27.63 kg/m2      Blood Pressure          Most Recent Value    BP  (!)  146/70      Allergies as of 4/27/2017     Morphine      Immunizations Administered on Date of Encounter - 4/27/2017     None      Language Assistance Services     ATTENTION: Language assistance services are available, free of charge. Please call [phone].      ATENCIÓN: Si habla ashvin, tiene a morocho disposición servicios gratuitos de asistencia lingüística. Llame al [phone].     CHÚ Ý: N?u b?n nói Ti?ng Vi?t, có các d?ch v? h? tr? ngôn ng? mi?n phí dành cho b?n. G?i s? [phone].         " [facility] complies with applicable Federal civil rights laws and does not discriminate on the basis of race, color, national origin, age, disability, or sex.

## 2017-04-27 NOTE — PROGRESS NOTES
Subjective:   Patient ID:  Kodi Ribeiro is a 67 y.o. male who presents for evaluation of Pre-op Exam (ref Dr Jin for kidney procedure); Hypertension; and Peripheral Vascular Disease      HPI Comments: 68 yo, male, came in for preop clearance of kidney stent procedure.  PMH CAD s/p PCI stents x3 in 2000,  f/u with Dr. Boone in 2014. AAA s/p graft infection and aorto-bifemoral bypass [id number], Left kidney stents s/p multiple removal and replacement. Last one was one yr ago.  03/2014 Normal EF.  EKG on 04/27 NSR.  He denied chest pain, dyspnea on exertion, palpitation, fainting, PND, orthopnea, syncope and claudication.   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    Jejunal polyp     VCE 3/7/2014    Lipoma of colon     colonoscopy 2/21/2014    Myocardial infarction     per patient 2000 & 9/2012    Phantom limb syndrome     patient reports only intermittent not problematic, not worsening    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    Tobacco dependence     resolved    Ulcer of ileum     VCE 3/7/2014    Ureteral stent retained        Past Surgical History:   Procedure Laterality Date    ABDOMINAL AORTIC ANEURYSM REPAIR      ABDOMINAL AORTIC ANEURYSM REPAIR  1996/2014    AORTA - BILATERAL FEMORAL ARTERY BYPASS GRAFT  2014    Left and right leg    CORONARY ANGIOPLASTY WITH STENT PLACEMENT  2000    Three placed in heart    FOOT AMPUTATION THROUGH METATARSAL  1996    left    FOOT SURGERY      per patient multiple toe amputations prior to partial foot amputation    KIDNEY SURGERY   2014    per patient separation of horseshoe kidney @ time of AAA repair    LUNG LOBECTOMY Right 1970s    per patient not cancer    right below knee amputation  2009 (approx)    SMALL INTESTINE SURGERY  2014    per patient partial @ time of aaa repair    TONSILLECTOMY      URETERAL STENT PLACEMENT         Social History   Substance Use Topics    Smoking status: Former Smoker     Packs/day: 1.00     Years: 15.00     Quit date: 1/1/2009    Smokeless tobacco: Never Used    Alcohol use No       Family History   Problem Relation Age of Onset    Cancer Mother      lung    Heart disease Father      MI but per patient bc of old age    Diabetes Daughter     Eczema Neg Hx     Lupus Neg Hx     Psoriasis Neg Hx     Melanoma Neg Hx     Kidney disease Neg Hx     Stroke Neg Hx        Review of Systems   Constitution: Negative for decreased appetite, diaphoresis, fever, weakness, malaise/fatigue and night sweats.   HENT: Negative for headaches and nosebleeds.    Eyes: Negative for blurred vision and double vision.   Cardiovascular: Negative for chest pain, claudication, dyspnea on exertion, irregular heartbeat, leg swelling, near-syncope, orthopnea, palpitations, paroxysmal nocturnal dyspnea and syncope.   Respiratory: Negative for cough, shortness of breath, sleep disturbances due to breathing, snoring, sputum production and wheezing.    Endocrine: Negative for cold intolerance and polyuria.   Hematologic/Lymphatic: Does not bruise/bleed easily.   Skin: Negative for rash.   Musculoskeletal: Negative for back pain, falls, joint pain, joint swelling and neck pain.   Gastrointestinal: Negative for abdominal pain, heartburn, nausea and vomiting.   Genitourinary: Negative for dysuria, frequency and hematuria.   Neurological: Negative for difficulty with concentration, dizziness, focal weakness, light-headedness, numbness and seizures.   Psychiatric/Behavioral: Negative for depression, memory loss and substance abuse.  The patient does not have insomnia.    Allergic/Immunologic: Negative for HIV exposure and hives.       Objective:   Physical Exam   Constitutional: He is oriented to person, place, and time. He appears well-nourished.   HENT:   Head: Normocephalic.   Eyes: Pupils are equal, round, and reactive to light.   Neck: Normal carotid pulses and no JVD present. Carotid bruit is not present. No thyromegaly present.   Cardiovascular: Normal rate, regular rhythm, normal heart sounds and normal pulses.   No extrasystoles are present. PMI is not displaced.  Exam reveals no gallop and no S3.    No murmur heard.  Pulmonary/Chest: Breath sounds normal. No stridor. No respiratory distress.   Abdominal: Soft. Bowel sounds are normal. There is no tenderness. There is no rebound.   Musculoskeletal: Normal range of motion.   Right BKA  Left foot amputation   Neurological: He is alert and oriented to person, place, and time.   Skin: Skin is intact. No rash noted.   Psychiatric: His behavior is normal.       Lab Results   Component Value Date    CHOL 141 12/29/2016    CHOL 168 12/04/2014    CHOL 178 05/30/2014     Lab Results   Component Value Date    HDL 29 (L) 12/29/2016    HDL 33 (L) 12/04/2014    HDL 41 05/30/2014     Lab Results   Component Value Date    LDLCALC 69.2 12/29/2016    LDLCALC 105.2 12/04/2014    LDLCALC 115.4 05/30/2014     Lab Results   Component Value Date    TRIG 214 (H) 12/29/2016    TRIG 149 12/04/2014    TRIG 108 05/30/2014     Lab Results   Component Value Date    CHOLHDL 20.6 12/29/2016    CHOLHDL 19.6 (L) 12/04/2014    CHOLHDL 23.0 05/30/2014       Chemistry        Component Value Date/Time     04/24/2017 1107    K 4.5 04/24/2017 1107     04/24/2017 1107    CO2 18 (L) 04/24/2017 1107    BUN 12 04/24/2017 1107    CREATININE 1.4 04/24/2017 1107    GLU 81 04/24/2017 1107        Component Value Date/Time    CALCIUM 9.3 04/24/2017 1107    ALKPHOS 139 (H) 12/29/2016 1152    AST 14 12/29/2016 1152    ALT 13  12/29/2016 1152    BILITOT 0.3 12/29/2016 1152          Lab Results   Component Value Date    TSH 1.024 10/30/2012     Lab Results   Component Value Date    INR 1.1 03/13/2014    INR 1.1 02/20/2014    INR 1.1 01/21/2014     Lab Results   Component Value Date    WBC 6.33 04/24/2017    HGB 12.4 (L) 04/24/2017    HCT 37.5 (L) 04/24/2017    MCV 91 04/24/2017     04/24/2017     BNP  @LABRCNTIP(BNP,BNPTRIAGEBLO)@  Estimated Creatinine Clearance: 57.1 mL/min (based on Cr of 1.4).     Assessment:      1. Coronary artery disease involving native coronary artery of native heart without angina pectoris    2. Preop cardiovascular exam    3. Essential hypertension    4. PVD (peripheral vascular disease)    5. Old MI (myocardial infarction)    6. Mixed hyperlipidemia      BP uncontrolled    Plan:   Elevated periop risk of CV events for low risk procedure.  Good functional and exercise capacity.  No chest pain, active arrhythmia and CHF symptoms.  Ok to proceed the scheduled surgery without further cardiac study.  OK to hold Plavix 5 to 7 days before the procedure and resume ASAP postop.  Continue Coreg and Statin periop.  Avoid periop fluid overloaded.  F/u with Dr. Boone as scheduled

## 2017-05-02 NOTE — PRE-PROCEDURE INSTRUCTIONS
Pre op instructions reviewed with patient per phone:    To confirm, Your surgeon has instructed you:  Surgery is scheduled 5/4/17 at [facility].      Please report to [facility] 1st floor main lobby by ARSH Jin's office to call and inform of arrival time.      INSTRUCTIONS IMPORTANT!!!  ¨ Do not eat, drink, or smoke after 12 midnight-including water. OK to brush teeth, no gum, candy or mints!    ¨ Take only these medicines with a small swallow of water-morning of surgery.  Norvasc, Coreg, Losartan, Prilosec    ____  Do not wear makeup, including mascara.  ____  No powder, lotions or creams to surgical area.  ____  Please remove all jewelry, including piercings and leave at home.  ____  No money or valuables needed. Please leave at home.  ____  Please bring identification and insurance information to hospital.  ____  If going home the same day, arrange for a ride home. You will not be able to   drive if Anesthesia was used.  ____  Children, under 12 years old, must remain in the waiting room with an adult.  They are not allowed in patient areas.  ____  Wear loose fitting clothing. Allow for dressings, bandages.  ____  Stop Aspirin, Ibuprofen, Motrin and Aleve at least 5-7 days before surgery, unless otherwise instructed by your doctor, or the nurse.   You MAY use Tylenol/acetaminophen until day of surgery.  ____  If you take diabetic medication, do not take am of surgery unless instructed by   Doctor.  ____ Stop taking any Fish Oil supplement or any Vitamins that contain Vitamin E at least 5 days prior to surgery.          Bathing Instructions-- The night before surgery and the morning prior to coming to the hospital:   -Do not shave the surgical area.   -Shower and wash your hair and body as usual with anti-bacterial  soap and shampoo.   -Rinse your hair and body completely.   -Use one packet of hibiclens to wash the surgical site (using your hand) gently for 5 minutes.  Do not scrub you  skin too hard.   -Do not use hibiclens on your head, face, or genitals.   -Do not wash with anti-bacterial soap after you use the hibiclens.   -Rinse your body thoroughly.   -Dry with clean, soft towel.  Do not use lotion, cream, deodorant, or powders on   the surgical site.    Use antibacterial soap in place of hibiclens if your surgery is on the head, face or genitals.         Surgical Site Infection    Prevention of surgical site infections:     -Keep incisions clean and dry.   -Do not soak/submerge incisions in water until completely healed.   -Do not apply lotions, powders, creams, or deodorants to site.   -Always make sure hands are cleaned with antibacterial soap/ alcohol-based   prior to touching the surgical site.  (This includes doctors, nurses, staff, and yourself.)    Signs and symptoms:   -Redness and pain around the area where you had surgery   -Drainage of cloudy fluid from your surgical wound   -Fever over 100.4  I have read or had read and explained to me, and understand the above information.

## 2017-05-03 ENCOUNTER — TELEPHONE (OUTPATIENT)
Dept: UROLOGY | Facility: CLINIC | Age: 67
End: 2017-05-03

## 2017-05-03 ENCOUNTER — ANESTHESIA EVENT (OUTPATIENT)
Dept: SURGERY | Facility: HOSPITAL | Age: 67
End: 2017-05-03
Payer: MEDICARE

## 2017-05-04 ENCOUNTER — HOSPITAL ENCOUNTER (OUTPATIENT)
Facility: HOSPITAL | Age: 67
Discharge: HOME | End: 2017-05-04
Attending: UROLOGY | Admitting: UROLOGY
Payer: MEDICARE

## 2017-05-04 ENCOUNTER — SURGERY (OUTPATIENT)
Age: 67
End: 2017-05-04

## 2017-05-04 ENCOUNTER — ANESTHESIA (OUTPATIENT)
Dept: SURGERY | Facility: HOSPITAL | Age: 67
End: 2017-05-04
Payer: MEDICARE

## 2017-05-04 ENCOUNTER — TELEPHONE (OUTPATIENT)
Dept: UROLOGY | Facility: CLINIC | Age: 67
End: 2017-05-04

## 2017-05-04 VITALS
RESPIRATION RATE: 15 BRPM | OXYGEN SATURATION: 99 % | SYSTOLIC BLOOD PRESSURE: 144 MMHG | HEIGHT: 74 IN | HEART RATE: 68 BPM | DIASTOLIC BLOOD PRESSURE: 74 MMHG | TEMPERATURE: 98 F | BODY MASS INDEX: 27.08 KG/M2 | WEIGHT: 211 LBS

## 2017-05-04 DIAGNOSIS — N13.5 URETERAL STRICTURE, LEFT: ICD-10-CM

## 2017-05-04 PROCEDURE — 63600175 PHARM REV CODE 636 W HCPCS: Performed by: CLINIC/CENTER

## 2017-05-04 PROCEDURE — 36000707: Performed by: UROLOGY

## 2017-05-04 PROCEDURE — 63600175 PHARM REV CODE 636 W HCPCS: Performed by: UROLOGY

## 2017-05-04 PROCEDURE — 71000015 HC POSTOP RECOV 1ST HR: Performed by: UROLOGY

## 2017-05-04 PROCEDURE — 25000003 PHARM REV CODE 250: Performed by: ANESTHESIOLOGY

## 2017-05-04 PROCEDURE — 36000706: Performed by: UROLOGY

## 2017-05-04 PROCEDURE — 37000009 HC ANESTHESIA EA ADD 15 MINS: Performed by: UROLOGY

## 2017-05-04 PROCEDURE — 37000008 HC ANESTHESIA 1ST 15 MINUTES: Performed by: UROLOGY

## 2017-05-04 PROCEDURE — 74420 UROGRAPHY RTRGR +-KUB: CPT | Mod: 26,,, | Performed by: UROLOGY

## 2017-05-04 PROCEDURE — 76000 FLUOROSCOPY <1 HR PHYS/QHP: CPT | Mod: 26,59,, | Performed by: UROLOGY

## 2017-05-04 PROCEDURE — 25000003 PHARM REV CODE 250: Performed by: CLINIC/CENTER

## 2017-05-04 PROCEDURE — C1769 GUIDE WIRE: HCPCS | Performed by: UROLOGY

## 2017-05-04 PROCEDURE — C2617 STENT, NON-COR, TEM W/O DEL: HCPCS | Performed by: UROLOGY

## 2017-05-04 PROCEDURE — 71000033 HC RECOVERY, INTIAL HOUR: Performed by: UROLOGY

## 2017-05-04 PROCEDURE — 52332 CYSTOSCOPY AND TREATMENT: CPT | Mod: LT,,, | Performed by: UROLOGY

## 2017-05-04 PROCEDURE — 25500020 PHARM REV CODE 255: Performed by: UROLOGY

## 2017-05-04 DEVICE — STENT URETERAL UNIV 7FR 26CM
Type: IMPLANTABLE DEVICE | Site: URETER | Status: NON-FUNCTIONAL
Removed: 2018-05-29

## 2017-05-04 RX ORDER — PROPOFOL 10 MG/ML
VIAL (ML) INTRAVENOUS
Status: DISCONTINUED | OUTPATIENT
Start: 2017-05-04 | End: 2017-05-04

## 2017-05-04 RX ORDER — ONDANSETRON 2 MG/ML
4 INJECTION INTRAMUSCULAR; INTRAVENOUS DAILY PRN
Status: DISCONTINUED | OUTPATIENT
Start: 2017-05-04 | End: 2017-05-04 | Stop reason: HOSPADM

## 2017-05-04 RX ORDER — LIDOCAINE HYDROCHLORIDE 10 MG/ML
INJECTION INFILTRATION; PERINEURAL
Status: DISCONTINUED | OUTPATIENT
Start: 2017-05-04 | End: 2017-05-04

## 2017-05-04 RX ORDER — FENTANYL CITRATE 50 UG/ML
INJECTION, SOLUTION INTRAMUSCULAR; INTRAVENOUS
Status: DISCONTINUED | OUTPATIENT
Start: 2017-05-04 | End: 2017-05-04

## 2017-05-04 RX ORDER — ROCURONIUM BROMIDE 10 MG/ML
INJECTION, SOLUTION INTRAVENOUS
Status: DISCONTINUED | OUTPATIENT
Start: 2017-05-04 | End: 2017-05-04

## 2017-05-04 RX ORDER — DOCUSATE SODIUM 100 MG/1
100 CAPSULE, LIQUID FILLED ORAL 2 TIMES DAILY
Qty: 60 CAPSULE | Refills: 0 | Status: SHIPPED | OUTPATIENT
Start: 2017-05-04 | End: 2020-01-13 | Stop reason: ALTCHOICE

## 2017-05-04 RX ORDER — OXYCODONE HYDROCHLORIDE 5 MG/1
5 TABLET ORAL
Status: DISCONTINUED | OUTPATIENT
Start: 2017-05-04 | End: 2017-05-04 | Stop reason: HOSPADM

## 2017-05-04 RX ORDER — FENTANYL CITRATE 50 UG/ML
25 INJECTION, SOLUTION INTRAMUSCULAR; INTRAVENOUS EVERY 10 MIN PRN
Status: DISCONTINUED | OUTPATIENT
Start: 2017-05-04 | End: 2017-05-04 | Stop reason: HOSPADM

## 2017-05-04 RX ORDER — SUCCINYLCHOLINE CHLORIDE 20 MG/ML
INJECTION INTRAMUSCULAR; INTRAVENOUS
Status: DISCONTINUED | OUTPATIENT
Start: 2017-05-04 | End: 2017-05-04

## 2017-05-04 RX ORDER — OXYCODONE AND ACETAMINOPHEN 5; 325 MG/1; MG/1
1-2 TABLET ORAL EVERY 4 HOURS PRN
Qty: 30 TABLET | Refills: 0 | Status: ON HOLD | OUTPATIENT
Start: 2017-05-04 | End: 2018-05-29 | Stop reason: HOSPADM

## 2017-05-04 RX ORDER — MEPERIDINE HYDROCHLORIDE 50 MG/ML
12.5 INJECTION INTRAMUSCULAR; INTRAVENOUS; SUBCUTANEOUS ONCE AS NEEDED
Status: DISCONTINUED | OUTPATIENT
Start: 2017-05-04 | End: 2017-05-04 | Stop reason: HOSPADM

## 2017-05-04 RX ORDER — SODIUM CHLORIDE, SODIUM LACTATE, POTASSIUM CHLORIDE, CALCIUM CHLORIDE 600; 310; 30; 20 MG/100ML; MG/100ML; MG/100ML; MG/100ML
INJECTION, SOLUTION INTRAVENOUS CONTINUOUS
Status: DISCONTINUED | OUTPATIENT
Start: 2017-05-04 | End: 2017-05-04 | Stop reason: HOSPADM

## 2017-05-04 RX ORDER — ONDANSETRON 2 MG/ML
INJECTION INTRAMUSCULAR; INTRAVENOUS
Status: DISCONTINUED | OUTPATIENT
Start: 2017-05-04 | End: 2017-05-04

## 2017-05-04 RX ORDER — HYDROCODONE BITARTRATE AND ACETAMINOPHEN 10; 325 MG/1; MG/1
1 TABLET ORAL EVERY 4 HOURS PRN
Status: DISCONTINUED | OUTPATIENT
Start: 2017-05-04 | End: 2017-05-04 | Stop reason: HOSPADM

## 2017-05-04 RX ORDER — HYDROMORPHONE HYDROCHLORIDE 2 MG/ML
0.2 INJECTION, SOLUTION INTRAMUSCULAR; INTRAVENOUS; SUBCUTANEOUS EVERY 5 MIN PRN
Status: DISCONTINUED | OUTPATIENT
Start: 2017-05-04 | End: 2017-05-04 | Stop reason: HOSPADM

## 2017-05-04 RX ORDER — MIDAZOLAM HYDROCHLORIDE 1 MG/ML
INJECTION, SOLUTION INTRAMUSCULAR; INTRAVENOUS
Status: DISCONTINUED | OUTPATIENT
Start: 2017-05-04 | End: 2017-05-04

## 2017-05-04 RX ORDER — HYDROCODONE BITARTRATE AND ACETAMINOPHEN 5; 325 MG/1; MG/1
1 TABLET ORAL EVERY 4 HOURS PRN
Status: DISCONTINUED | OUTPATIENT
Start: 2017-05-04 | End: 2017-05-04 | Stop reason: HOSPADM

## 2017-05-04 RX ORDER — CIPROFLOXACIN 500 MG/1
500 TABLET ORAL EVERY 12 HOURS
Qty: 6 TABLET | Refills: 0 | Status: SHIPPED | OUTPATIENT
Start: 2017-05-04 | End: 2017-05-07

## 2017-05-04 RX ORDER — CEFAZOLIN SODIUM 2 G/50ML
2 SOLUTION INTRAVENOUS
Status: DISCONTINUED | OUTPATIENT
Start: 2017-05-04 | End: 2017-05-04 | Stop reason: HOSPADM

## 2017-05-04 RX ADMIN — MIDAZOLAM HYDROCHLORIDE 2 MG: 1 INJECTION, SOLUTION INTRAMUSCULAR; INTRAVENOUS at 08:05

## 2017-05-04 RX ADMIN — ONDANSETRON 4 MG: 2 INJECTION, SOLUTION INTRAMUSCULAR; INTRAVENOUS at 08:05

## 2017-05-04 RX ADMIN — FENTANYL CITRATE 100 MCG: 50 INJECTION, SOLUTION INTRAMUSCULAR; INTRAVENOUS at 08:05

## 2017-05-04 RX ADMIN — ROCURONIUM BROMIDE 5 MG: 10 INJECTION, SOLUTION INTRAVENOUS at 08:05

## 2017-05-04 RX ADMIN — SUCCINYLCHOLINE CHLORIDE 100 MG: 20 INJECTION, SOLUTION INTRAMUSCULAR; INTRAVENOUS at 08:05

## 2017-05-04 RX ADMIN — SODIUM CHLORIDE, SODIUM LACTATE, POTASSIUM CHLORIDE, AND CALCIUM CHLORIDE: 600; 310; 30; 20 INJECTION, SOLUTION INTRAVENOUS at 08:05

## 2017-05-04 RX ADMIN — LIDOCAINE HYDROCHLORIDE 60 MG: 10 INJECTION, SOLUTION INFILTRATION; PERINEURAL at 08:05

## 2017-05-04 RX ADMIN — PROPOFOL 150 MG: 10 INJECTION, EMULSION INTRAVENOUS at 08:05

## 2017-05-04 RX ADMIN — IOHEXOL 50 ML: 350 INJECTION, SOLUTION INTRAVENOUS at 08:05

## 2017-05-04 RX ADMIN — CEFAZOLIN SODIUM 2 G: 2 SOLUTION INTRAVENOUS at 08:05

## 2017-05-04 NOTE — PLAN OF CARE
Pt and pt wife given discharge instructions. Both stated understanding. Pt to void before discharge. Pt to be brought to main entrance via wheelchair post void.

## 2017-05-04 NOTE — ANESTHESIA PREPROCEDURE EVALUATION
05/04/2017  Kodi Ribeiro is a 67 y.o., male.    Anesthesia Evaluation    I have reviewed the Patient Summary Reports.    I have reviewed the Nursing Notes.   I have reviewed the Medications.     Review of Systems  Anesthesia Hx:  No problems with previous Anesthesia    Cardiovascular:   Hypertension Past MI (2000) CAD asymptomatic CABG/stent (3 stents 2000)  ECG has been reviewed.    Pulmonary:  Pulmonary Normal    Renal/:   Chronic Renal Disease    Hepatic/GI:   PUD, GERD    Neurological:   Neuromuscular Disease, (Neuropathy, phantom limb)    Endocrine:  Endocrine Normal        Physical Exam  General:  Well nourished    Airway/Jaw/Neck:  Airway Findings: Mouth Opening: Normal Tongue: Normal  General Airway Assessment: Adult  Mallampati: II  Jaw/Neck Findings:  Neck ROM: Normal ROM      Dental:  Dental Findings: Upper Dentures   Chest/Lungs:  Chest/Lungs Findings: Clear to auscultation, Normal Respiratory Rate     Heart/Vascular:  Heart Findings: Rate: Normal  Rhythm: Regular Rhythm  Sounds: Normal        Mental Status:  Mental Status Findings:  Cooperative, Alert and Oriented         Anesthesia Plan  Type of Anesthesia, risks & benefits discussed:  Anesthesia Type:  general, MAC  Patient's Preference:   Intra-op Monitoring Plan:   Intra-op Monitoring Plan Comments:   Post Op Pain Control Plan:   Post Op Pain Control Plan Comments:   Induction:   IV  Beta Blocker:  Patient is on a Beta-Blocker and has received one dose within the past 24 hours (No further documentation required).       Informed Consent: Patient understands risks and agrees with Anesthesia plan.  Questions answered. Anesthesia consent signed with patient.  ASA Score: 3     Day of Surgery Review of History & Physical: I have interviewed and examined the patient. I have reviewed the patient's H&P dated:  There are no significant changes.           Ready For Surgery From Anesthesia Perspective.

## 2017-05-04 NOTE — DISCHARGE SUMMARY
Date of Discharge: 05/04/2017     Principle Diagnosis: Left ureteral obstruction    Secondary Diagnosis:  has a past medical history of Anemia; Colon polyp; Colon polyp (6/22/2015); Diverticulosis; Encounter for blood transfusion; GERD (gastroesophageal reflux disease); Hemorrhoids; Horseshoe kidney; Hyperglycemia (3/17/2014); Hypertension; Infection of aortic graft (3/14/2014); Jejunal polyp; Lipoma of colon; Myocardial infarction; Phantom limb syndrome; S/P aorto-bifemoral bypass surgery (3/17/2014); Tobacco dependence; Ulcer of ileum; and Ureteral stent retained.    History of Present Illness: Pt was worked up in clinic and today's procedure was scheduled.  Please see H&P for full details.    Hospital Course: Pt presented on the day of surgery and after proper consents were obtained he was brought to the OR where his procedure was performed without difficulty.    Discharge Disposition: Home    Followup Plan:  1. Pt is provided with medications for pain and others as indicated.  2. RTC in 1 year

## 2017-05-04 NOTE — ANESTHESIA POSTPROCEDURE EVALUATION
"Anesthesia Post Evaluation    Patient: Kodi Ribeiro    Procedure(s) Performed: Procedure(s) (LRB):  CYSTOSCOPY WITH STENT PLACEMENT (Left)  PYELOGRAM-RETROGRADE (Left)  REMOVAL-STENT-URETERAL (Left)    Final Anesthesia Type: general  Patient location during evaluation: PACU  Patient participation: Yes- Able to Participate  Level of consciousness: awake  Post-procedure vital signs: reviewed and stable  Pain management: adequate  Airway patency: patent  PONV status at discharge: No PONV  Anesthetic complications: no      Cardiovascular status: blood pressure returned to baseline  Respiratory status: unassisted, spontaneous ventilation and room air  Hydration status: euvolemic  Follow-up not needed.        Visit Vitals    BP (!) 144/74    Pulse 68    Temp 36.6 °C (97.9 °F) (Temporal)    Resp 15    Ht 6' 2" (1.88 m)    Wt 95.7 kg (210 lb 15.7 oz)    SpO2 99%    BMI 27.09 kg/m2       Pain/Rommel Score: Pain Assessment Performed: Yes (5/4/2017  9:15 AM)  Presence of Pain: denies (5/4/2017  9:45 AM)  Rommel Score: 9 (5/4/2017  9:45 AM)      "

## 2017-05-04 NOTE — DISCHARGE INSTRUCTIONS
General Information:  1.  Do not drink alcoholic beverages including beer for 24 hours or as long as you are on pain medication..  2.  Do not drive a motor vehicle, operate machinery or power tools, or signs legal papers for 24 hours or as long as you are on pain medication.   3.  You may experience light-headedness, dizziness, and sleepiness following surgery. Please do not stay alone. A responsible adult should be with you for this 24 hour period.  4.  Go home and rest.  5. Progress slowly to a normal diet unless instructed.  Otherwise, begin with liquids such as soft drinks, then soup and crackers working up to solid foods. Drink plenty of nonalcoholic fluids.  6.  Certain anesthetics and pain medications produce nausea and vomiting in certain       individuals. If nausea becomes a problem at home, call you doctor.  7. A nurse will be calling you sometime after surgery. Do not be alarmed. This is our way of finding out how you are doing.  8. Several times every hour while you are awake, take 2-3 deep breaths and cough. If you had stomach surgery hold a pillow or rolled towel firmly against your stomach before you cough. This will help with any pain the cough might cause.  9. Several times every hour while you are awake, pump and flex your feet 5-6 times and do foot circles. This will help prevent blood clots.  10.Call your doctor for severe pain, bleeding, fever, or signs or symptoms of infection (pain, swelling, redness, foul odor, drainage).  11.You can contact your doctor anytime by [phone] for the [facility] (at [facility]) or [phone] for the [facility] on [facility].   [email or web address] is another way to contact your doctor if you are an active participant online with [facility].      Ciprofloxacin tablets  What is this medicine?  CIPROFLOXACIN (sip michelle FLOX a sin) is a quinolone antibiotic. It is used to treat certain kinds of bacterial infections. It will not work for  colds, flu, or other viral infections.  How should I use this medicine?  Take this medicine by mouth with a glass of water. Follow the directions on the prescription label. Take your medicine at regular intervals. Do not take your medicine more often than directed. Take all of your medicine as directed even if you think your are better. Do not skip doses or stop your medicine early.  You can take this medicine with food or on an empty stomach. It can be taken with a meal that contains dairy or calcium, but do not take it alone with a dairy product, like milk or yogurt or calcium-fortified juice.  A special MedGuide will be given to you by the pharmacist with each prescription and refill. Be sure to read this information carefully each time.  Talk to your pediatrician regarding the use of this medicine in children. Special care may be needed.  What side effects may I notice from receiving this medicine?  Side effects that you should report to your doctor or health care professional as soon as possible:  · allergic reactions like skin rash or hives, swelling of the face, lips, or tongue  · anxious  · confusion  · depressed mood  · diarrhea  · fast, irregular heartbeat  · hallucination, loss of contact with reality  · joint, muscle, or tendon pain or swelling  · pain, tingling, numbness in the hands or feet  · suicidal thoughts or other mood changes  · sunburn  · unusually weak or tired  Side effects that usually do not require medical attention (report to your doctor or health care professional if they continue or are bothersome):  · dry mouth  · headache  · nausea  · trouble sleeping  What may interact with this medicine?  Do not take this medicine with any of the following medications:  · cisapride  · droperidol  · terfenadine  · tizanidine  This medicine may also interact with the following medications:  · antacids  · birth control pills  · caffeine  · cyclosporin  · didanosine (ddI) buffered tablets or  powder  · medicines for diabetes  · medicines for inflammation like ibuprofen, naproxen  · methotrexate  · multivitamins  · omeprazole  · phenytoin  · probenecid  · sucralfate  · theophylline  · warfarin  What if I miss a dose?  If you miss a dose, take it as soon as you can. If it is almost time for your next dose, take only that dose. Do not take double or extra doses.  Where should I keep my medicine?  Keep out of the reach of children.  Store at room temperature below 30 degrees C (86 degrees F). Keep container tightly closed. Throw away any unused medicine after the expiration date.  What should I tell my health care provider before I take this medicine?  They need to know if you have any of these conditions:  · bone problems  · cerebral disease  · history of low levels of potassium in the blood  · joint problems  · irregular heartbeat  · kidney disease  · myasthenia gravis  · seizures  · tendon problems  · tingling of the fingers or toes, or other nerve disorder  · an unusual or allergic reaction to ciprofloxacin, other antibiotics or medicines, foods, dyes, or preservatives  · pregnant or trying to get pregnant  · breast-feeding  What should I watch for while using this medicine?  Tell your doctor or health care professional if your symptoms do not improve.  Do not treat diarrhea with over the counter products. Contact your doctor if you have diarrhea that lasts more than 2 days or if it is severe and watery.  You may get drowsy or dizzy. Do not drive, use machinery, or do anything that needs mental alertness until you know how this medicine affects you. Do not stand or sit up quickly, especially if you are an older patient. This reduces the risk of dizzy or fainting spells.  This medicine can make you more sensitive to the sun. Keep out of the sun. If you cannot avoid being in the sun, wear protective clothing and use sunscreen. Do not use sun lamps or tanning beds/booths.  Avoid antacids, aluminum, calcium,  iron, magnesium, and zinc products for 6 hours before and 2 hours after taking a dose of this medicine.  Date Last Reviewed:   NOTE:This sheet is a summary. It may not cover all possible information. If you have questions about this medicine, talk to your doctor, pharmacist, or health care provider. Copyright© 2016 [employer]    Docusate Sodium; Senna tablets or capsules  What is this medicine?  DOCUSATE SODIUM; SENNA (doc CUE sayt DYLAN oli um; SEN na) contains a stool softener and a laxative. It is used to treat constipation.  How should I use this medicine?  Take this medicine by mouth with a full glass of water. Follow the directions on the label. Take your doses at regular intervals. Do not take your medicine more often than directed.  Talk to your pediatrician regarding the use of this medicine in children. While this medicine may be prescribed for children as young as 2 years for selected conditions, precautions do apply.  What side effects may I notice from receiving this medicine?  Side effects that you should report to your doctor or health care professional as soon as possible:  · allergic reactions like skin rash, itching or hives, swelling of the face, lips, or tongue  · muscle weakness  · unusually weak or tired  · unusual weight loss  Side effects that usually do not require medical attention (report to your doctor or health care professional if they continue or are bothersome):  · diarrhea  · discolored urine  · nausea, vomiting  · stomach cramps  · throat irritation  What may interact with this medicine?  · mineral oil  What if I miss a dose?  If you miss a dose, take it as soon as you can. If it is almost time for your next dose, take only that dose. Do not take double or extra doses.  Where should I keep my medicine?  Keep out of the reach of children.  Store at room temperature between 15 and 30 degrees C (59 and 86 degrees F). Throw away any unused medicine after the expiration date.  What  should I tell my health care provider before I take this medicine?  They need to know if you have any of these conditions:  · nausea or vomiting  · severe constipation  · stomach pain  · sudden change in bowel habit lasting more than 2 weeks  · an unusual or allergic reaction to docusate, senna, other medicines, foods, dyes, or preservatives  · pregnant or trying to get pregnant  · breast-feeding  What should I watch for while using this medicine?  Do not use for more than one week without advice from your doctor or health care professional. Long-term use can make your body depend on the laxative for regular bowel movements, damage the bowel, cause malnutrition, and problems with the amounts of water and salts in your body. If your constipation keeps returning, check with your doctor or health care professional.  Drink plenty of water while taking this medicine. This will help fight constipation.  Stop using this medicine and contact your doctor or health care professional if you experience any rectal bleeding or do not have a bowel movement after use. These could be signs of a more serious condition.  Date Last Reviewed:   NOTE:This sheet is a summary. It may not cover all possible information. If you have questions about this medicine, talk to your doctor, pharmacist, or health care provider. Copyright© 2016 [employer]      Acetaminophen; Oxycodone tablets  What is this medicine?  ACETAMINOPHEN; OXYCODONE (a set a MARLENI wendy fen; ox i KOE done) is a pain reliever. It is used to treat moderate to severe pain.  How should I use this medicine?  Take this medicine by mouth with a full glass of water. Follow the directions on the prescription label. You can take it with or without food. If it upsets your stomach, take it with food. Take your medicine at regular intervals. Do not take it more often than directed.  A special MedGuide will be given to you by the pharmacist with each prescription and refill. Be sure to  read this information carefully each time.  Talk to your pediatrician regarding the use of this medicine in children. Special care may be needed.  What side effects may I notice from receiving this medicine?  Side effects that you should report to your doctor or health care professional as soon as possible:  · allergic reactions like skin rash, itching or hives, swelling of the face, lips, or tongue  · breathing problems  · confusion  · redness, blistering, peeling or loosening of the skin, including inside the mouth  · signs and symptoms of liver injury like dark yellow or brown urine; general ill feeling or flu-like symptoms; light-colored stools; loss of appetite; nausea; right upper belly pain; unusually weak or tired; yellowing of the eyes or skin  · signs and symptoms of low blood pressure like dizziness; feeling faint or lightheaded, falls; unusually weak or tired  · trouble passing urine or change in the amount of urine  Side effects that usually do not require medical attention (report to your doctor or health care professional if they continue or are bothersome):  · constipation  · dry mouth  · nausea, vomiting  · tiredness  What may interact with this medicine?  This medicine may interact with the following medications:  · alcohol  · antihistamines for allergy, cough and cold  · antiviral medicines for HIV or AIDS  · atropine  · certain antibiotics like clarithromycin, erythromycin, linezolid, rifampin  · certain medicines for anxiety or sleep  · certain medicines for bladder problems like oxybutynin, tolterodine  · certain medicines for depression like amitriptyline, fluoxetine, sertraline  · certain medicines for fungal infections like ketoconazole, itraconazole, voriconazole  · certain medicines for migraine headache like almotriptan, eletriptan, frovatriptan, naratriptan, rizatriptan, sumatriptan, zolmitriptan  · certain medicines for nausea or vomiting like dolasetron, ondansetron,  palonosetron  · certain medicines for Parkinson's disease like benztropine, trihexyphenidyl  · certain medicines for seizures like phenobarbital, phenytoin, primidone  · certain medicines for stomach problems like dicyclomine, hyoscyamine  · certain medicines for travel sickness like scopolamine  · diuretics  · general anesthetics like halothane, isoflurane, methoxyflurane, propofol  · ipratropium  · local anesthetics like lidocaine, pramoxine, tetracaine  · MAOIs like Carbex, Eldepryl, Marplan, Nardil, and Parnate  · medicines that relax muscles for surgery  · methylene blue  · nilotinib  · other medicines with acetaminophen  · other narcotic medicines for pain or cough  · phenothiazines like chlorpromazine, mesoridazine, prochlorperazine, thioridazine  What if I miss a dose?  If you miss a dose, take it as soon as you can. If it is almost time for your next dose, take only that dose. Do not take double or extra doses.  Where should I keep my medicine?  Keep out of the reach of children. This medicine can be abused. Keep your medicine in a safe place to protect it from theft. Do not share this medicine with anyone. Selling or giving away this medicine is dangerous and against the law.  This medicine may cause accidental overdose and death if it taken by other adults, children, or pets. Mix any unused medicine with a substance like cat litter or coffee grounds. Then throw the medicine away in a sealed container like a sealed bag or a coffee can with a lid. Do not use the medicine after the expiration date.  Store at room temperature between 20 and 25 degrees C (68 and 77 degrees F).  What should I tell my health care provider before I take this medicine?  They need to know if you have any of these conditions:  · brain tumor  · Crohn's disease, inflammatory bowel disease, or ulcerative colitis  · drug abuse or addiction  · head injury  · heart or circulation problems  · if you often drink alcohol  · kidney disease or  problems going to the bathroom  · liver disease  · lung disease, asthma, or breathing problems  · an unusual or allergic reaction to acetaminophen, oxycodone, other opioid analgesics, other medicines, foods, dyes, or preservatives  · pregnant or trying to get pregnant  · breast-feeding  What should I watch for while using this medicine?  Tell your doctor or health care professional if your pain does not go away, if it gets worse, or if you have new or a different type of pain. You may develop tolerance to the medicine. Tolerance means that you will need a higher dose of the medication for pain relief. Tolerance is normal and is expected if you take this medicine for a long time.  Do not suddenly stop taking your medicine because you may develop a severe reaction. Your body becomes used to the medicine. This does NOT mean you are addicted. Addiction is a behavior related to getting and using a drug for a non-medical reason. If you have pain, you have a medical reason to take pain medicine. Your doctor will tell you how much medicine to take. If your doctor wants you to stop the medicine, the dose will be slowly lowered over time to avoid any side effects.  There are different types of narcotic medicines (opiates). If you take more than one type at the same time or if you are taking another medicine that also causes drowsiness, you may have more side effects. Give your health care provider a list of all medicines you use. Your doctor will tell you how much medicine to take. Do not take more medicine than directed. Call emergency for help if you have problems breathing or unusual sleepiness.  Do not take other medicines that contain acetaminophen with this medicine. Always read labels carefully. If you have questions, ask your doctor or pharmacist.  If you take too much acetaminophen get medical help right away. Too much acetaminophen can be very dangerous and cause liver damage. Even if you do not have symptoms, it is  important to get help right away.  You may get drowsy or dizzy. Do not drive, use machinery, or do anything that needs mental alertness until you know how this medicine affects you. Do not stand or sit up quickly, especially if you are an older patient. This reduces the risk of dizzy or fainting spells. Alcohol may interfere with the effect of this medicine. Avoid alcoholic drinks.  The medicine will cause constipation. Try to have a bowel movement at least every 2 to 3 days. If you do not have a bowel movement for 3 days, call your doctor or health care professional.  Your mouth may get dry. Chewing sugarless gum or sucking hard candy, and drinking plenty or water may help. Contact your doctor if the problem does not go away or is severe.  Date Last Reviewed:   NOTE:This sheet is a summary. It may not cover all possible information. If you have questions about this medicine, talk to your doctor, pharmacist, or health care provider. Copyright© 2016 [employer]

## 2017-05-04 NOTE — TRANSFER OF CARE
"Anesthesia Transfer of Care Note    Patient: Kodi Ribeiro    Procedure(s) Performed: Procedure(s) (LRB):  CYSTOSCOPY WITH STENT PLACEMENT (Left)  PYELOGRAM-RETROGRADE (Left)  REMOVAL-STENT-URETERAL (Left)    Patient location: PACU    Anesthesia Type: general    Transport from OR: Transported from OR on room air with adequate spontaneous ventilation    Post pain: adequate analgesia    Post assessment: no apparent anesthetic complications and tolerated procedure well    Post vital signs: stable    Level of consciousness: sedated    Nausea/Vomiting: no nausea/vomiting    Complications: none    Transfer of care protocol was followed      Last vitals:   Visit Vitals    BP (!) 158/73 (BP Location: Right arm, Patient Position: Sitting, BP Method: Automatic)    Pulse 79    Temp 36.8 °C (98.3 °F) (Temporal)    Resp 18    Ht 6' 2" (1.88 m)    Wt 95.7 kg (210 lb 15.7 oz)    SpO2 97%    BMI 27.09 kg/m2     "

## 2017-05-04 NOTE — OP NOTE
Date of Procedure: 05/04/2017    Pre-operative Diagnosis:   1.  Left ureteral obstruction    Post-operative Diagnosis:   1.  same    Surgeon: GISELE Jin MD    Assistants: None    Specimen: None    Prosthetics, Devices, Grafts: None    Anesthesia: MAC    Procedures:  1. Cysto with placement of left ureteral stent  2. Left retrograde pyelogram  3. Fluoro less than one hour    Procedure in Detail:  After proper consents were obtained, the patient was prepped and draped in normal sterile fashion in the dorsal lithotomy position.  The 22 Fr resectascope was assembled and introduced per urethra.  The bladder was inspected.  A guide wire was passed to the left renal pelvis and a 5Fr catheter inserted to confirm UPJ position.  A left retrograde pyelogram was done with half-dilute water soluble contrast.  The kidney was normal with normal renal pelvis and calyces delicate; ureter was normal proximally.  The catheter was withdrawn. A new stent was then passed over the wire and when the wire was withdrawn fluoroscopy confirmed good placement with a curl in the stent on both ends.  The cystoscope was then reinserted to the bladder which was drained and the scope was then withdrawn and set aside.    Blood Loss: none    Fluids: Per anesthesia.    Drains: 7x26cm left ureteral stent    Complications: None.

## 2017-05-04 NOTE — IP AVS SNAPSHOT
[address] [place] [address]           Patient Discharge Instructions   Our goal is to set you up for success. This packet includes information on your condition, medications, and your home care.  It will help you care for yourself to prevent having to return to the hospital.     Please ask your nurse if you have any questions.      There are many details to remember when preparing to leave the hospital. Here is what you will need to do:    1. Take your medicine. If you are prescribed medications, review your Medication List on the following pages. You may have new medications to  at the pharmacy and others that you'll need to stop taking. Review the instructions for how and when to take your medications. Talk with your doctor or nurses if you are unsure of what to do.     2. Go to your follow-up appointments. Specific follow-up information is listed in the following pages. Your may be contacted by a nurse or clinical provider about future appointments. Be sure we have all of the phone numbers to reach you. Please contact your provider's office if you are unable to make an appointment.     3. Watch for warning signs. Your doctor or nurse will give you detailed warning signs to watch for and when to call for assistance. These instructions may also include educational information about your condition. If you experience any of warning signs to your health, call your doctor.               ** Verify the list of medication(s) below is accurate and up to date. Carry this with you in case of emergency. If your medications have changed, please notify your healthcare provider.             Medication List      START taking these medications        Additional Info                      ciprofloxacin HCl 500 MG tablet   Commonly known as:  CIPRO   Quantity:  6 tablet   Refills:  0   Dose:  500 mg    Instructions:  Take 1 tablet (500 mg total) by mouth every 12 (twelve)  hours.     Begin Date    AM    Noon    PM    Bedtime       docusate sodium 100 MG capsule   Commonly known as:  COLACE   Quantity:  60 capsule   Refills:  0   Dose:  100 mg    Instructions:  Take 1 capsule (100 mg total) by mouth 2 (two) times daily.     Begin Date    AM    Noon    PM    Bedtime       oxycodone-acetaminophen 5-325 mg per tablet   Commonly known as:  PERCOCET   Quantity:  30 tablet   Refills:  0   Dose:  1-2 tablet    Instructions:  Take 1-2 tablets by mouth every 4 (four) hours as needed for Pain.     Begin Date    AM    Noon    PM    Bedtime         CONTINUE taking these medications        Additional Info                      amlodipine 10 MG tablet   Commonly known as:  NORVASC   Quantity:  90 tablet   Refills:  3    Instructions:  TAKE 1 TABLET ONE TIME DAILY     Begin Date    AM    Noon    PM    Bedtime       betamethasone dipropionate 0.05 % cream   Commonly known as:  DIPROLENE   Quantity:  60 g   Refills:  3    Instructions:  AAA bid as needed.  For rash on forearms.  Do not use on face, underarms or groin area     Begin Date    AM    Noon    PM    Bedtime       carvedilol 6.25 MG tablet   Commonly known as:  COREG   Quantity:  180 tablet   Refills:  3   Dose:  6.25 mg    Instructions:  Take 1 tablet (6.25 mg total) by mouth 2 (two) times daily.     Begin Date    AM    Noon    PM    Bedtime       clopidogrel 75 mg tablet   Commonly known as:  PLAVIX   Quantity:  90 tablet   Refills:  3    Instructions:  TAKE 1 TABLET ONE TIME DAILY     Begin Date    AM    Noon    PM    Bedtime       losartan 100 MG tablet   Commonly known as:  COZAAR   Quantity:  90 tablet   Refills:  3   Dose:  100 mg    Instructions:  Take 1 tablet (100 mg total) by mouth once daily.     Begin Date    AM    Noon    PM    Bedtime       omeprazole 20 MG capsule   Commonly known as:  PRILOSEC   Quantity:  90 capsule   Refills:  3   Dose:  20 mg    Instructions:  Take 1 capsule (20 mg total) by mouth 2 (two) times daily.      Begin Date    AM    Noon    PM    Bedtime       pravastatin 80 MG tablet   Commonly known as:  PRAVACHOL   Quantity:  90 tablet   Refills:  3   Dose:  80 mg    Instructions:  Take 1 tablet (80 mg total) by mouth every evening.     Begin Date    AM    Noon    PM    Bedtime       triamcinolone acetonide 0.1% 0.1 % cream   Commonly known as:  KENALOG   Refills:  0   Dose:  1 application    Instructions:  1 application 2 (two) times daily. Apply to affected area     Begin Date    AM    Noon    PM    Bedtime            Where to Get Your Medications      These medications were sent to [facility] [place], [place] - [address] [facility]  [address] [facility] [address]     Phone:  [phone]     ciprofloxacin HCl 500 MG tablet    docusate sodium 100 MG capsule    oxycodone-acetaminophen 5-325 mg per tablet                  Please bring to all follow up appointments:    1. A copy of your discharge instructions.  2. All medicines you are currently taking in their original bottles.  3. Identification and insurance card.    Please arrive 15 minutes ahead of scheduled appointment time.    Please call 24 hours in advance if you must reschedule your appointment and/or time.        Your Scheduled Appointments     Oct 23, 2017 10:20 AM CDT   Established Patient Visit with Norma Nuñez MD   [facility] ([facility])    [address]  [facility] [id number] [phone]            May 04, 2018  9:00 AM CDT   Post OP with MD Dimitris Moran IV - Urology ([facility])    [address] [facility] [address] [phone]                Discharge Instructions     Future Orders    Activity as tolerated     Call MD for:  persistent nausea and vomiting     Call MD for:  severe uncontrolled pain     Call MD for:  temperature >100.4     Diet general     Questions:    Total calories:      Fat restriction, if any:      Protein  restriction, if any:      Na restriction, if any:      Fluid restriction:      Additional restrictions:      Shower on day dressing removed (No bath)         Discharge Instructions         General Information:  1.  Do not drink alcoholic beverages including beer for 24 hours or as long as you are on pain medication..  2.  Do not drive a motor vehicle, operate machinery or power tools, or signs legal papers for 24 hours or as long as you are on pain medication.   3.  You may experience light-headedness, dizziness, and sleepiness following surgery. Please do not stay alone. A responsible adult should be with you for this 24 hour period.  4.  Go home and rest.  5. Progress slowly to a normal diet unless instructed.  Otherwise, begin with liquids such as soft drinks, then soup and crackers working up to solid foods. Drink plenty of nonalcoholic fluids.  6.  Certain anesthetics and pain medications produce nausea and vomiting in certain       individuals. If nausea becomes a problem at home, call you doctor.  7. A nurse will be calling you sometime after surgery. Do not be alarmed. This is our way of finding out how you are doing.  8. Several times every hour while you are awake, take 2-3 deep breaths and cough. If you had stomach surgery hold a pillow or rolled towel firmly against your stomach before you cough. This will help with any pain the cough might cause.  9. Several times every hour while you are awake, pump and flex your feet 5-6 times and do foot circles. This will help prevent blood clots.  10.Call your doctor for severe pain, bleeding, fever, or signs or symptoms of infection (pain, swelling, redness, foul odor, drainage).  11.You can contact your doctor anytime by [phone] for the [facility] (at [facility]) or [phone] for the [facility] on [facility].   [email or web address] is another way to contact your doctor if you are an active participant online with [facility].      Ciprofloxacin tablets  What is this medicine?  CIPROFLOXACIN (sip michelle FLOX a sin) is a quinolone antibiotic. It is used to treat certain kinds of bacterial infections. It will not work for colds, flu, or other viral infections.  How should I use this medicine?  Take this medicine by mouth with a glass of water. Follow the directions on the prescription label. Take your medicine at regular intervals. Do not take your medicine more often than directed. Take all of your medicine as directed even if you think your are better. Do not skip doses or stop your medicine early.  You can take this medicine with food or on an empty stomach. It can be taken with a meal that contains dairy or calcium, but do not take it alone with a dairy product, like milk or yogurt or calcium-fortified juice.  A special MedGuide will be given to you by the pharmacist with each prescription and refill. Be sure to read this information carefully each time.  Talk to your pediatrician regarding the use of this medicine in children. Special care may be needed.  What side effects may I notice from receiving this medicine?  Side effects that you should report to your doctor or health care professional as soon as possible:  · allergic reactions like skin rash or hives, swelling of the face, lips, or tongue  · anxious  · confusion  · depressed mood  · diarrhea  · fast, irregular heartbeat  · hallucination, loss of contact with reality  · joint, muscle, or tendon pain or swelling  · pain, tingling, numbness in the hands or feet  · suicidal thoughts or other mood changes  · sunburn  · unusually weak or tired  Side effects that usually do not require medical attention (report to your doctor or health care professional if they continue or are bothersome):  · dry mouth  · headache  · nausea  · trouble sleeping  What may interact with this medicine?  Do not take this medicine with any of the following  medications:  · cisapride  · droperidol  · terfenadine  · tizanidine  This medicine may also interact with the following medications:  · antacids  · birth control pills  · caffeine  · cyclosporin  · didanosine (ddI) buffered tablets or powder  · medicines for diabetes  · medicines for inflammation like ibuprofen, naproxen  · methotrexate  · multivitamins  · omeprazole  · phenytoin  · probenecid  · sucralfate  · theophylline  · warfarin  What if I miss a dose?  If you miss a dose, take it as soon as you can. If it is almost time for your next dose, take only that dose. Do not take double or extra doses.  Where should I keep my medicine?  Keep out of the reach of children.  Store at room temperature below 30 degrees C (86 degrees F). Keep container tightly closed. Throw away any unused medicine after the expiration date.  What should I tell my health care provider before I take this medicine?  They need to know if you have any of these conditions:  · bone problems  · cerebral disease  · history of low levels of potassium in the blood  · joint problems  · irregular heartbeat  · kidney disease  · myasthenia gravis  · seizures  · tendon problems  · tingling of the fingers or toes, or other nerve disorder  · an unusual or allergic reaction to ciprofloxacin, other antibiotics or medicines, foods, dyes, or preservatives  · pregnant or trying to get pregnant  · breast-feeding  What should I watch for while using this medicine?  Tell your doctor or health care professional if your symptoms do not improve.  Do not treat diarrhea with over the counter products. Contact your doctor if you have diarrhea that lasts more than 2 days or if it is severe and watery.  You may get drowsy or dizzy. Do not drive, use machinery, or do anything that needs mental alertness until you know how this medicine affects you. Do not stand or sit up quickly, especially if you are an older patient. This reduces the risk of dizzy or fainting  maxine.  This medicine can make you more sensitive to the sun. Keep out of the sun. If you cannot avoid being in the sun, wear protective clothing and use sunscreen. Do not use sun lamps or tanning beds/booths.  Avoid antacids, aluminum, calcium, iron, magnesium, and zinc products for 6 hours before and 2 hours after taking a dose of this medicine.  Date Last Reviewed:   NOTE:This sheet is a summary. It may not cover all possible information. If you have questions about this medicine, talk to your doctor, pharmacist, or health care provider. Copyright© 2016 [employer]    Docusate Sodium; Senna tablets or capsules  What is this medicine?  DOCUSATE SODIUM; SENNA (doc CUE sayt DYLAN oli um; SEN na) contains a stool softener and a laxative. It is used to treat constipation.  How should I use this medicine?  Take this medicine by mouth with a full glass of water. Follow the directions on the label. Take your doses at regular intervals. Do not take your medicine more often than directed.  Talk to your pediatrician regarding the use of this medicine in children. While this medicine may be prescribed for children as young as 2 years for selected conditions, precautions do apply.  What side effects may I notice from receiving this medicine?  Side effects that you should report to your doctor or health care professional as soon as possible:  · allergic reactions like skin rash, itching or hives, swelling of the face, lips, or tongue  · muscle weakness  · unusually weak or tired  · unusual weight loss  Side effects that usually do not require medical attention (report to your doctor or health care professional if they continue or are bothersome):  · diarrhea  · discolored urine  · nausea, vomiting  · stomach cramps  · throat irritation  What may interact with this medicine?  · mineral oil  What if I miss a dose?  If you miss a dose, take it as soon as you can. If it is almost time for your next dose, take only that dose. Do  not take double or extra doses.  Where should I keep my medicine?  Keep out of the reach of children.  Store at room temperature between 15 and 30 degrees C (59 and 86 degrees F). Throw away any unused medicine after the expiration date.  What should I tell my health care provider before I take this medicine?  They need to know if you have any of these conditions:  · nausea or vomiting  · severe constipation  · stomach pain  · sudden change in bowel habit lasting more than 2 weeks  · an unusual or allergic reaction to docusate, senna, other medicines, foods, dyes, or preservatives  · pregnant or trying to get pregnant  · breast-feeding  What should I watch for while using this medicine?  Do not use for more than one week without advice from your doctor or health care professional. Long-term use can make your body depend on the laxative for regular bowel movements, damage the bowel, cause malnutrition, and problems with the amounts of water and salts in your body. If your constipation keeps returning, check with your doctor or health care professional.  Drink plenty of water while taking this medicine. This will help fight constipation.  Stop using this medicine and contact your doctor or health care professional if you experience any rectal bleeding or do not have a bowel movement after use. These could be signs of a more serious condition.  Date Last Reviewed:   NOTE:This sheet is a summary. It may not cover all possible information. If you have questions about this medicine, talk to your doctor, pharmacist, or health care provider. Copyright© 2016 [employer]      Acetaminophen; Oxycodone tablets  What is this medicine?  ACETAMINOPHEN; OXYCODONE (a set a MARLENI wendy fen; ox i KOE done) is a pain reliever. It is used to treat moderate to severe pain.  How should I use this medicine?  Take this medicine by mouth with a full glass of water. Follow the directions on the prescription label. You can take it with or  without food. If it upsets your stomach, take it with food. Take your medicine at regular intervals. Do not take it more often than directed.  A special MedGuide will be given to you by the pharmacist with each prescription and refill. Be sure to read this information carefully each time.  Talk to your pediatrician regarding the use of this medicine in children. Special care may be needed.  What side effects may I notice from receiving this medicine?  Side effects that you should report to your doctor or health care professional as soon as possible:  · allergic reactions like skin rash, itching or hives, swelling of the face, lips, or tongue  · breathing problems  · confusion  · redness, blistering, peeling or loosening of the skin, including inside the mouth  · signs and symptoms of liver injury like dark yellow or brown urine; general ill feeling or flu-like symptoms; light-colored stools; loss of appetite; nausea; right upper belly pain; unusually weak or tired; yellowing of the eyes or skin  · signs and symptoms of low blood pressure like dizziness; feeling faint or lightheaded, falls; unusually weak or tired  · trouble passing urine or change in the amount of urine  Side effects that usually do not require medical attention (report to your doctor or health care professional if they continue or are bothersome):  · constipation  · dry mouth  · nausea, vomiting  · tiredness  What may interact with this medicine?  This medicine may interact with the following medications:  · alcohol  · antihistamines for allergy, cough and cold  · antiviral medicines for HIV or AIDS  · atropine  · certain antibiotics like clarithromycin, erythromycin, linezolid, rifampin  · certain medicines for anxiety or sleep  · certain medicines for bladder problems like oxybutynin, tolterodine  · certain medicines for depression like amitriptyline, fluoxetine, sertraline  · certain medicines for fungal infections like ketoconazole,  itraconazole, voriconazole  · certain medicines for migraine headache like almotriptan, eletriptan, frovatriptan, naratriptan, rizatriptan, sumatriptan, zolmitriptan  · certain medicines for nausea or vomiting like dolasetron, ondansetron, palonosetron  · certain medicines for Parkinson's disease like benztropine, trihexyphenidyl  · certain medicines for seizures like phenobarbital, phenytoin, primidone  · certain medicines for stomach problems like dicyclomine, hyoscyamine  · certain medicines for travel sickness like scopolamine  · diuretics  · general anesthetics like halothane, isoflurane, methoxyflurane, propofol  · ipratropium  · local anesthetics like lidocaine, pramoxine, tetracaine  · MAOIs like Carbex, Eldepryl, Marplan, Nardil, and Parnate  · medicines that relax muscles for surgery  · methylene blue  · nilotinib  · other medicines with acetaminophen  · other narcotic medicines for pain or cough  · phenothiazines like chlorpromazine, mesoridazine, prochlorperazine, thioridazine  What if I miss a dose?  If you miss a dose, take it as soon as you can. If it is almost time for your next dose, take only that dose. Do not take double or extra doses.  Where should I keep my medicine?  Keep out of the reach of children. This medicine can be abused. Keep your medicine in a safe place to protect it from theft. Do not share this medicine with anyone. Selling or giving away this medicine is dangerous and against the law.  This medicine may cause accidental overdose and death if it taken by other adults, children, or pets. Mix any unused medicine with a substance like cat litter or coffee grounds. Then throw the medicine away in a sealed container like a sealed bag or a coffee can with a lid. Do not use the medicine after the expiration date.  Store at room temperature between 20 and 25 degrees C (68 and 77 degrees F).  What should I tell my health care provider before I take this medicine?  They need to know if you  have any of these conditions:  · brain tumor  · Crohn's disease, inflammatory bowel disease, or ulcerative colitis  · drug abuse or addiction  · head injury  · heart or circulation problems  · if you often drink alcohol  · kidney disease or problems going to the bathroom  · liver disease  · lung disease, asthma, or breathing problems  · an unusual or allergic reaction to acetaminophen, oxycodone, other opioid analgesics, other medicines, foods, dyes, or preservatives  · pregnant or trying to get pregnant  · breast-feeding  What should I watch for while using this medicine?  Tell your doctor or health care professional if your pain does not go away, if it gets worse, or if you have new or a different type of pain. You may develop tolerance to the medicine. Tolerance means that you will need a higher dose of the medication for pain relief. Tolerance is normal and is expected if you take this medicine for a long time.  Do not suddenly stop taking your medicine because you may develop a severe reaction. Your body becomes used to the medicine. This does NOT mean you are addicted. Addiction is a behavior related to getting and using a drug for a non-medical reason. If you have pain, you have a medical reason to take pain medicine. Your doctor will tell you how much medicine to take. If your doctor wants you to stop the medicine, the dose will be slowly lowered over time to avoid any side effects.  There are different types of narcotic medicines (opiates). If you take more than one type at the same time or if you are taking another medicine that also causes drowsiness, you may have more side effects. Give your health care provider a list of all medicines you use. Your doctor will tell you how much medicine to take. Do not take more medicine than directed. Call emergency for help if you have problems breathing or unusual sleepiness.  Do not take other medicines that contain acetaminophen with this medicine. Always read  "labels carefully. If you have questions, ask your doctor or pharmacist.  If you take too much acetaminophen get medical help right away. Too much acetaminophen can be very dangerous and cause liver damage. Even if you do not have symptoms, it is important to get help right away.  You may get drowsy or dizzy. Do not drive, use machinery, or do anything that needs mental alertness until you know how this medicine affects you. Do not stand or sit up quickly, especially if you are an older patient. This reduces the risk of dizzy or fainting spells. Alcohol may interfere with the effect of this medicine. Avoid alcoholic drinks.  The medicine will cause constipation. Try to have a bowel movement at least every 2 to 3 days. If you do not have a bowel movement for 3 days, call your doctor or health care professional.  Your mouth may get dry. Chewing sugarless gum or sucking hard candy, and drinking plenty or water may help. Contact your doctor if the problem does not go away or is severe.  Date Last Reviewed:   NOTE:This sheet is a summary. It may not cover all possible information. If you have questions about this medicine, talk to your doctor, pharmacist, or health care provider. Copyright© 2016 [employer] Standard                      Primary Diagnosis     Your primary diagnosis was:  Ureter Obstruction      Admission Information     Date & Time Provider Department CSN    5/4/2017  6:47 AM Scooter Jin IV, MD [facility] [id number]      Care Providers     Provider Role Specialty Primary office phone    Scooter Jin IV, MD Attending Provider Urology [phone]    Scooter Jin IV, MD Surgeon  Urology [phone]      Your Vitals Were     BP Pulse Temp Resp Height Weight    126/68 60 98.1 °F (36.7 °C) (Temporal) 21 6' 2" (1.88 m) 95.7 kg (210 lb 15.7 oz)    SpO2 BMI             100% 27.09 kg/m2         Recent Lab Values        6/27/2012 12/4/2012 6/23/2016                     5:14 AM  1:45 AM  3:34 " PM         A1C 5.3 5.9 5.6                   Allergies as of 5/4/2017        Reactions    Morphine Itching      [facility]     [facility] Nurse Care Line - 24/7 Assistance  Unless otherwise directed by your provider, please contact [facility], our nurse care line that is available for 24/7 assistance.     Registered nurses in the [facility] provide clinical advisement, health education, appointment booking, and other advisory services.  Call for this free service at [phone].        Advance Directives     An advance directive is a document which, in the event you are no longer able to make decisions for yourself, tells your healthcare team what kind of treatment you do or do not want to receive, or who you would like to make those decisions for you.  If you do not currently have an advance directive, [facility] encourages you to create one.  For more information call:  [phone]), [phone] ([phone]),  or log on to [email or web address].        Smoking Cessation     If you would like to quit smoking:   You may be eligible for free services if you are a [place] resident and started smoking cigarettes before September 1, 1988.  Call the Smoking Cessation Trust (SCT) toll free at (474) 622-4101 or (056) 267-0659.   Call 6-934-QUIT-NOW if you do not meet the above criteria.   Contact us via email: [email or web address]   View our website for more information: [email or web address]        Language Assistance Services     ATTENTION: Language assistance services are available, free of charge. Please call [phone].      ATENCIÓN: Si habla español, tiene a morocho disposición servicios gratuitos de asistencia lingüística. Llame al [phone].     CHÚ Ý: N?u b?n nói Ti?ng Vi?t, có các d?ch v? h? tr? ngôn ng? mi?n phí dành cho b?n. G?i s? [phone].        Chronic Kindey Disease Education              [facility] complies with  applicable Federal civil rights laws and does not discriminate on the basis of race, color, national origin, age, disability, or sex.

## 2017-05-04 NOTE — PLAN OF CARE
Pt lying in bed. No signs/symptoms of pain or distress. Family updated upon pt arrival to recovery room.

## 2017-06-20 ENCOUNTER — OFFICE VISIT (OUTPATIENT)
Dept: URGENT CARE | Facility: CLINIC | Age: 67
End: 2017-06-20
Payer: MEDICARE

## 2017-06-20 VITALS
WEIGHT: 211.44 LBS | SYSTOLIC BLOOD PRESSURE: 159 MMHG | BODY MASS INDEX: 28.02 KG/M2 | HEART RATE: 83 BPM | DIASTOLIC BLOOD PRESSURE: 77 MMHG | HEIGHT: 73 IN | TEMPERATURE: 97 F | OXYGEN SATURATION: 99 %

## 2017-06-20 DIAGNOSIS — R21 RASH: ICD-10-CM

## 2017-06-20 DIAGNOSIS — I10 ESSENTIAL HYPERTENSION: ICD-10-CM

## 2017-06-20 DIAGNOSIS — L30.9 DERMATITIS: Primary | ICD-10-CM

## 2017-06-20 DIAGNOSIS — L29.9 PRURITUS: ICD-10-CM

## 2017-06-20 DIAGNOSIS — N18.30 CKD (CHRONIC KIDNEY DISEASE) STAGE 3, GFR 30-59 ML/MIN: ICD-10-CM

## 2017-06-20 PROCEDURE — 99999 PR PBB SHADOW E&M-EST. PATIENT-LVL IV: CPT | Mod: PBBFAC,,, | Performed by: NURSE PRACTITIONER

## 2017-06-20 PROCEDURE — 1159F MED LIST DOCD IN RCRD: CPT | Mod: S$GLB,,, | Performed by: NURSE PRACTITIONER

## 2017-06-20 PROCEDURE — 1126F AMNT PAIN NOTED NONE PRSNT: CPT | Mod: S$GLB,,, | Performed by: NURSE PRACTITIONER

## 2017-06-20 PROCEDURE — 99499 UNLISTED E&M SERVICE: CPT | Mod: S$GLB,,, | Performed by: NURSE PRACTITIONER

## 2017-06-20 PROCEDURE — 99213 OFFICE O/P EST LOW 20 MIN: CPT | Mod: 25,S$GLB,, | Performed by: NURSE PRACTITIONER

## 2017-06-20 PROCEDURE — 96372 THER/PROPH/DIAG INJ SC/IM: CPT | Mod: S$GLB,,, | Performed by: NURSE PRACTITIONER

## 2017-06-20 RX ORDER — TRIAMCINOLONE ACETONIDE 0.25 MG/G
CREAM TOPICAL 2 TIMES DAILY
Qty: 15 G | Refills: 0 | Status: ON HOLD | OUTPATIENT
Start: 2017-06-20 | End: 2019-03-07 | Stop reason: HOSPADM

## 2017-06-20 RX ORDER — TRIAMCINOLONE ACETONIDE 1 MG/G
CREAM TOPICAL 2 TIMES DAILY
Qty: 80 G | Refills: 0 | Status: SHIPPED | OUTPATIENT
Start: 2017-06-20 | End: 2017-06-30

## 2017-06-20 RX ORDER — METHYLPREDNISOLONE ACETATE 40 MG/ML
40 INJECTION, SUSPENSION INTRA-ARTICULAR; INTRALESIONAL; INTRAMUSCULAR; SOFT TISSUE
Status: COMPLETED | OUTPATIENT
Start: 2017-06-20 | End: 2017-06-20

## 2017-06-20 RX ADMIN — METHYLPREDNISOLONE ACETATE 40 MG: 40 INJECTION, SUSPENSION INTRA-ARTICULAR; INTRALESIONAL; INTRAMUSCULAR; SOFT TISSUE at 04:06

## 2017-06-20 NOTE — PATIENT INSTRUCTIONS
PLAN:   Depo medrol 40 mg im now  Advise increase p.o. fluids--at least 64 ounces of water/juice & rest  Meds: Kenalog 0.1%, Kenalog 0.025%  Simply saline nasal wash/flonase  to irrigate sinuses and for congestion/runny nose.  Practice good handwashing..  Tylenol or Ibuprofen for fever, headache and body aches.  Advise follow with PCP.  See PCP or go to ER if symptoms worsen or fail to improve with treatment.

## 2017-06-20 NOTE — PROGRESS NOTES
CHIEF COMPLAINT/REASON FOR VISIT:  itchy red rash on face, hands & arms    HISTORY OF PRESENT ILLNESS:  57-year-old male complains of itchy red rash on face, hands and arms onset several days ago.  Patient admits has itchy red rash frequently.  Patient admits ran out of creams last year. .  Admits has not had to use prescription creams for several months.  Requesting a refill on steroid cream.  Patient denies shortness of breath, congestion, fever, cough, back pain and urinary discomfort.  patient did ask nurse for pain medication, but never mention medication to provider.      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    Jejunal polyp     VCE 3/7/2014    Lipoma of colon     colonoscopy 2/21/2014    Myocardial infarction     per patient 2000 & 9/2012    Phantom limb syndrome     patient reports only intermittent not problematic, not worsening    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    Tobacco dependence     resolved    Ulcer of ileum     VCE 3/7/2014    Ureteral stent retained           Social History     Social History    Marital status:      Spouse name: Laury    Number of children: 2    Years of education: N/A     Occupational History    Retired       [employer]     Social History Main Topics    Smoking status: Former Smoker     Packs/day: 1.00     Years: 15.00     Quit date: 1/1/2009    Smokeless tobacco: Never Used    Alcohol use No    Drug use: No    Sexual activity: Not Currently     Partners: Female     Other Topics Concern    Not on file     Social History  Narrative    No narrative on file          Family History   Problem Relation Age of Onset    Cancer Mother      lung    Heart disease Father      MI but per patient bc of old age    Diabetes Daughter     Eczema Neg Hx     Lupus Neg Hx     Psoriasis Neg Hx     Melanoma Neg Hx     Kidney disease Neg Hx     Stroke Neg Hx        ROS:  GENERAL: No fever, chills, fatigability or weight loss.  SKIN: Itchy red rash on eyes, hands and arms.  HEENT: No headaches or recent head trauma.  Denies ear pain, discharge or vertigo. No loss of smell, no epistaxis or postnasal drip. No hoarseness or change in voice.   CHEST: Denies cyanosis, wheezing, cough and sputum production.  CARDIOVASCULAR: Denies chest pain, PND, orthopnea or reduced exercise tolerance.  ABDOMEN: Appetite fine. No weight loss. Denies diarrhea, abdominal pain, hematemesis or blood in stool.  MUSCULOSKELETAL: No joint stiffness or swelling. Denies back pain.  NEUROLOGIC: No history of seizures, paralysis, alteration of gait or coordination.  PSYCHIATRIC: Denies mood swings, depression or suicidal thoughts.    PE:   APPEARANCE: Well nourished, well developed, in mild distress.   SKIN: Upper and lower eyelids, hands and arms with pale red macular papular rash with excoriations   HEENT:  turbinates pink, pink pharynx, TM's clear bilateral.  CHEST: Lungs clear to auscultation.  No wheezing  CARDIOVASCULAR: Regular rate and rhythm .  MUSCULOSKELETAL: Upper and lower eyelids, hands and arms with pale red macular papular rash with excoriations, right lower extremity prosthesis.  NEUROLOGIC: No sensory deficits. Gait & Posture: Normal gait and fine motion. No cerebellar signs.  MENTAL STATUS: Patient alert, oriented x 3 & conversant.    PLAN:   Depo medrol 40 mg IM now  Advise increase p.o. fluids--at least 64 ounces of water/juice & rest  Meds: Kenalog 0.1%, Kenalog 0.025%  Simply saline nasal wash/Flonase  to irrigate sinuses and for congestion/runny  nose..  Practice good handwashing..  Tylenol or Ibuprofen for fever, headache and body aches.  Advise follow with PCP.  See PCP or go to ER if symptoms worsen or fail to improve with treatment.      DIAGNOSIS:  Rash  Pruritus  Dermatitis  Hypertension   Chronic kidney disease

## 2017-06-26 ENCOUNTER — OFFICE VISIT (OUTPATIENT)
Dept: URGENT CARE | Facility: CLINIC | Age: 67
End: 2017-06-26
Payer: MEDICARE

## 2017-06-26 VITALS
BODY MASS INDEX: 27.96 KG/M2 | OXYGEN SATURATION: 97 % | RESPIRATION RATE: 20 BRPM | SYSTOLIC BLOOD PRESSURE: 136 MMHG | WEIGHT: 211 LBS | HEART RATE: 94 BPM | TEMPERATURE: 98 F | DIASTOLIC BLOOD PRESSURE: 70 MMHG | HEIGHT: 73 IN

## 2017-06-26 DIAGNOSIS — L02.415 ABSCESS OF LEG, RIGHT: Primary | ICD-10-CM

## 2017-06-26 PROCEDURE — 99999 PR PBB SHADOW E&M-EST. PATIENT-LVL IV: CPT | Mod: PBBFAC,,, | Performed by: NURSE PRACTITIONER

## 2017-06-26 PROCEDURE — 99214 OFFICE O/P EST MOD 30 MIN: CPT | Mod: S$GLB,,, | Performed by: NURSE PRACTITIONER

## 2017-06-26 PROCEDURE — 1125F AMNT PAIN NOTED PAIN PRSNT: CPT | Mod: S$GLB,,, | Performed by: NURSE PRACTITIONER

## 2017-06-26 PROCEDURE — 87077 CULTURE AEROBIC IDENTIFY: CPT

## 2017-06-26 PROCEDURE — 87186 SC STD MICRODIL/AGAR DIL: CPT

## 2017-06-26 PROCEDURE — 87070 CULTURE OTHR SPECIMN AEROBIC: CPT

## 2017-06-26 PROCEDURE — 1159F MED LIST DOCD IN RCRD: CPT | Mod: S$GLB,,, | Performed by: NURSE PRACTITIONER

## 2017-06-26 RX ORDER — DOXYCYCLINE 100 MG/1
100 CAPSULE ORAL EVERY 12 HOURS
Qty: 20 CAPSULE | Refills: 0 | Status: SHIPPED | OUTPATIENT
Start: 2017-06-26 | End: 2017-07-06

## 2017-06-26 RX ORDER — MUPIROCIN 20 MG/G
OINTMENT TOPICAL 3 TIMES DAILY
Qty: 30 G | Refills: 0 | Status: SHIPPED | OUTPATIENT
Start: 2017-06-26 | End: 2017-07-06

## 2017-06-26 NOTE — PATIENT INSTRUCTIONS
Abscess (Antibiotic Treatment Only)  An abscess (sometimes called a boil) happens when bacteria get trapped under the skin and start to grow. Pus forms inside the abscess as the body responds to the bacteria. An abscess can happen with an insect bite, ingrown hair, blocked oil gland, pimple, cyst, or puncture wound.  In the early stages, your wound may be red and tender. For this stage, you may get antibiotics. If the abscess does not get better with antibiotics, it will need to be drained with a small cut.  Home care  These tips will help you care for your abscess at home:  · Soak the wound in hot water or apply hot packs (small towel soaked in hot water) to the area for 20 minutes at a time. Do this 3 to 4 times a day.  · Do not cut, squeeze, or pop the boil yourself.  · Apply antibiotic cream or ointment to the skin 3 to 4 times a day, unless something else was prescribed. Some ointments include an antibiotic plus a pain reliever.  · If your doctor prescribed antibiotics, do not stop taking them until you have finished the medicine or the doctor tells you to stop.  · You may use an over-the-counter pain medicine to control pain, unless another pain medicine was prescribed. If you have chronic liver or kidney disease or ever had a stomach ulcer or gastrointestinal bleeding, talk with your doctor before using these any of these.  Follow-up care  Follow up with your healthcare provider, or as advised. Check your wound each day for the signs of worsening infection listed below.  When to seek medical advice  Get prompt medical attention if any of these occur:  · An increase in redness or swelling  · Red streaks in the skin leading away from the abscess  · An increase in local pain or swelling  · Fever of 100.4ºF (38ºC) or higher, or as directed by your healthcare provider  · Pus or fluid coming from the abscess  · Boil returns after getting better  Date Last Reviewed: 9/1/2016  © 9467-3697 [employer] [address] [place] [address]. All rights reserved. This information is not intended as a substitute for professional medical care. Always follow your healthcare professional's instructions.

## 2017-06-29 LAB — BACTERIA SPEC AEROBE CULT: NORMAL

## 2017-07-27 ENCOUNTER — OFFICE VISIT (OUTPATIENT)
Dept: INTERNAL MEDICINE | Facility: CLINIC | Age: 67
End: 2017-07-27
Payer: MEDICARE

## 2017-07-27 VITALS
WEIGHT: 206.38 LBS | HEIGHT: 73 IN | BODY MASS INDEX: 27.35 KG/M2 | HEART RATE: 76 BPM | SYSTOLIC BLOOD PRESSURE: 134 MMHG | OXYGEN SATURATION: 96 % | DIASTOLIC BLOOD PRESSURE: 64 MMHG

## 2017-07-27 DIAGNOSIS — K21.9 HIATAL HERNIA WITH GERD: Chronic | ICD-10-CM

## 2017-07-27 DIAGNOSIS — Z00.00 ENCOUNTER FOR PREVENTIVE HEALTH EXAMINATION: Primary | ICD-10-CM

## 2017-07-27 DIAGNOSIS — Q63.1 HORSESHOE KIDNEY: Chronic | ICD-10-CM

## 2017-07-27 DIAGNOSIS — I10 ESSENTIAL HYPERTENSION: Chronic | ICD-10-CM

## 2017-07-27 DIAGNOSIS — N18.30 CKD (CHRONIC KIDNEY DISEASE) STAGE 3, GFR 30-59 ML/MIN: Chronic | ICD-10-CM

## 2017-07-27 DIAGNOSIS — Z89.432 STATUS POST TRANSMETATARSAL AMPUTATION OF LEFT FOOT: ICD-10-CM

## 2017-07-27 DIAGNOSIS — Z89.511 STATUS POST BELOW KNEE AMPUTATION OF RIGHT LOWER EXTREMITY: Chronic | ICD-10-CM

## 2017-07-27 DIAGNOSIS — H91.91 HEARING DECREASED, RIGHT: ICD-10-CM

## 2017-07-27 DIAGNOSIS — D64.9 ANEMIA, UNSPECIFIED TYPE: ICD-10-CM

## 2017-07-27 DIAGNOSIS — G60.9 IDIOPATHIC PERIPHERAL NEUROPATHY: Chronic | ICD-10-CM

## 2017-07-27 DIAGNOSIS — I25.2 OLD MI (MYOCARDIAL INFARCTION): ICD-10-CM

## 2017-07-27 DIAGNOSIS — N13.5 URETERAL STRICTURE: ICD-10-CM

## 2017-07-27 DIAGNOSIS — E78.2 MIXED HYPERLIPIDEMIA: Chronic | ICD-10-CM

## 2017-07-27 DIAGNOSIS — I73.9 PVD (PERIPHERAL VASCULAR DISEASE): Chronic | ICD-10-CM

## 2017-07-27 DIAGNOSIS — K44.9 HIATAL HERNIA WITH GERD: Chronic | ICD-10-CM

## 2017-07-27 PROCEDURE — G0439 PPPS, SUBSEQ VISIT: HCPCS | Mod: S$GLB,,, | Performed by: INTERNAL MEDICINE

## 2017-07-27 PROCEDURE — 99499 UNLISTED E&M SERVICE: CPT | Mod: S$GLB,,, | Performed by: INTERNAL MEDICINE

## 2017-07-27 PROCEDURE — 99999 PR PBB SHADOW E&M-EST. PATIENT-LVL III: CPT | Mod: PBBFAC,,, | Performed by: INTERNAL MEDICINE

## 2017-07-27 NOTE — PATIENT INSTRUCTIONS
Counseling and Referral of Other Preventative  (Italic type indicates deductible and co-insurance are waived)    Patient Name: Kodi Ribeiro  Today's Date: 7/27/2017      SERVICE LIMITATIONS RECOMMENDATION    Vaccines    · Pneumococcal (once after 65)    · Influenza (annually)    · Hepatitis B (if medium/high risk)    · Prevnar 13      Hepatitis B medium/high risk factors:       - End-stage renal disease       - Hemophiliacs who received Factor VII or         IX concentrates       - Clients of institutions for the mentally             retarded       - Persons who live in the same house as          a HepB carrier       - Homosexual men       - Illicit injectable drug abusers     Pneumococcal: Done, no repeat necessary     Influenza: Done, repeat in one year     Hepatitis B: N/A     Prevnar 13: Done, no repeat necessary    Prostate cancer screening (annually to age 75)     Prostate specific antigen (PSA) Shared decision making with Provider. Sometimes a co-pay may be required if the patient decides to have this test. The USPSTF no longer recommends prostate cancer screening routinely in medicine: every 1 year from urologist.    Colorectal cancer screening (to age 75)    · Fecal occult blood test (annual)  · Flexible sigmoidoscopy (5y)  · Screening colonoscopy (10y)  · Barium enema   Last done 2/21/14, recommend to repeat every 5  years    Diabetes self-management training (no USPSTF recommendations)  Requires referral by treating physician for patient with diabetes or renal disease. 10 hours of initial DSMT sessions of no less than 30 minutes each in a continuous 12-month period. 2 hours of follow-up DSMT in subsequent years.  N/A    Glaucoma screening (no USPSTF recommendation)  Diabetes mellitus, family history   , age 50 or over    American, age 65 or over  Last done more than a year , recommend to repeat every 1  years    Medical nutrition therapy for diabetes or renal disease (no  recommended schedule)  Requires referral by treating physician for patient with diabetes or renal disease or kidney transplant within the past 3 years.  Can be provided in same year as diabetes self-management training (DSMT), and CMS recommends medical nutrition therapy take place after DSMT. Up to 3 hours for initial year and 2 hours in subsequent years.  N/A    Cardiovascular screening blood tests (every 5 years)  · Fasting lipid panel  Order as a panel if possible  Last done 12/29/16, recommend to repeat every 1  years    Diabetes screening tests (at least every 3 years, Medicare covers annually or at 6-month intervals for prediabetic patients)  · Fasting blood sugar (FBS) or glucose tolerance test (GTT)  Patient must be diagnosed with one of the following:       - Hypertension       - Dyslipidemia       - Obesity (BMI 30kg/m2)       - Previous elevated impaired FBS or GTT       ... or any two of the following:       - Overweight (BMI 25 but <30)       - Family history of diabetes       - Age 65 or older       - History of gestational diabetes or birth of baby weighing more than 9 pounds  Done this year, repeat every year    Abdominal aortic aneurysm screening (once)  · Sonogram   Limited to patients who meet one of the following criteria:       - Men who are 65-75 years old and have smoked more than 100 cigarette in their lifetime       - Anyone with a family history of abdominal aortic aneurysm       - Anyone recommended for screening by the USPSTF  Last done 10/2015, recommend to repeat every 1  years Not a screening candidate. Does with Dr Jamil due to s/p AAA repair and revision. To contact Dr Jamil    HIV screening (annually for increased risk patients)  · HIV-1 and HIV-2 by EIA, or PATRICK, rapid antibody test or oral mucosa transudate  Patients must be at increased risk for HIV infection per USPSTF guidelines or pregnant. Tests covered annually for patient at increased risk or as requested by the  patient. Pregnant patients may receive up to 3 tests during pregnancy.  Risks discussed, screening is not recommended    Smoking cessation counseling (up to 8 sessions per year)  Patients must be asymptomatic of tobacco-related conditions to receive as a preventative service.  Non-smoker    Subsequent annual wellness visit  At least 12 months since last AWV  Return in one year     The following information is provided to all patients.  This information is to help you find resources for any of the problems found today that may be affecting your health:                Living healthy guide: www.[facility].[place].[facility]      Understanding Diabetes: www.diabetes.org      Eating healthy: www.cdc.gov/healthyweight      [facility] home safety checklist: www.cdc.gov/steadi/patient.html      Agency on Aging: www.goea.[place].[facility]      Alcoholics anonymous (AA): www.aa.org      Physical Activity: www.leon.nih.gov/xx4zkxr      Tobacco use: www.quitwithusla.org

## 2017-07-31 NOTE — PROGRESS NOTES
"Kodi Ribeiro presented for a  Medicare AWV and comprehensive Health Risk Assessment today. The following components were reviewed and updated:    · Medical history  · Family History  · Social history  · Allergies and Current Medications  · Health Risk Assessment  · Health Maintenance  · Care Team     ** See Completed Assessments for Annual Wellness Visit within the encounter summary.**       The following assessments were completed:  · Living Situation  · CAGE  · Depression Screening  · Timed Get Up and Go  · Whisper Test  · Cognitive Function Screening  · Nutrition Screening  · ADL Screening  · PAQ Screening    Physical Exam    PE:   /64 (BP Location: Left arm, Patient Position: Sitting, BP Method: Manual)   Pulse 76   Ht 6' 1" (1.854 m)   Wt 93.6 kg (206 lb 5.6 oz)   SpO2 96%   BMI 27.22 kg/m²     APPEARANCE: Patient is a 67 y.o.male /White . Awake, alert, in no distress, good historian.   HEENT: PERRLA, EOMI. No facial asymmetry.Tongue midline. Full dentures  NECK: Supple. Carotids: 2+, no bruits. No thyromegaly. No cervical adenopathy.   HEART: Regular rate and rhythm with  No murmur , rubs or gallops.   CHEST: Lungs clear to auscultation.   BACK:  No spinous tenderness. No flank tenderness.   ABDOMEN: Well healed midline abdominal scarBowel sounds normal. Not distended. Soft.   EXTREMITIES: Rt BKA with about 8 inches of lower leg below knee, well healed stump. Left transmetatarsal amputation.( stump not visualized, brace on lower leg.  No clubbing, cyanosis of fingers. Radial pulses intact.  NEURO:  Hearing decreased on right.Motor: Symmetric grasp. Sensory intact to monofilament testing, symmetric on hands. Finger to nose is intact with no tremor. No spasticity or muscle fasciculations. Gait: Right lower leg prosthesis. Sight limp   SKIN:  No suspicious lesions or ulcerations.         Diagnoses and health risks identified today and associated recommendations/orders:    1. Encounter for " preventive health examination  Completed.    2. PVD (peripheral vascular disease)  Stable and controlled. Followed by Dr Jamil Vascular Surgery. S/P fem-fem bypass graft and left SFA stent (See outside progress 6/27/2013 under [facility])  Cath 2/25/2013--- SUMMARY:  1. multilevel disease on the left with evidence of cellulitis osteomyelitis and non healing ulcer.  2. unsuccessful attempt at recanalization of the left iliac occlusion.  Continue current treatment plan as previously prescribed with your physicians.     3. 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  Stable and controlled. Feels he has easier time with rristhesis than he did with multiple surgeries whittling away on his toes then feet previously.He had had osteomyelitis with PAD.  Continue current treatment plan as previously prescribed with your physicians.     4. Status post transmetatarsal amputation of left foot  Stable and controlled. Continue current treatment plan as previously prescribed with your PCP.     5. CAD;Old MI ; s/p stents x 3 (2000)   Stable and controlled on Plavix, carvedilol and pravastatin. Continue current treatment plan as previously prescribed with your cardiologist Dr Boone.      6. Essential hypertension  Stable and controlled on amlodipine, losartan and carvedilol. Continue current treatment plan as previously prescribed with your PCP.     7. Mixed hyperlipidemia  Stable and controlled on pravatatin. Continue current treatment plan as previously prescribed with your PCP.   Component      Latest Ref Rng & Units 12/29/2016   Cholesterol      120 - 199 mg/dL 141   Triglycerides      30 - 150 mg/dL 214 (H)   HDL      40 - 75 mg/dL 29 (L)   LDL Cholesterol      63.0 - 159.0 mg/dL 69.2     8. Ureteral stricture  Stable and controlled with routine sten exchanges by Urologist.. Continue current treatment plan as previously prescribed by Dr Jin.     9. CKD (chronic kidney disease) stage 3, GFR 30-59 ml/min  Stable  and controlled. Continue current treatment plan as previously prescribed with your physicians.      Ref Range    4/24/17   1/25/17   12/29/16   6/27/16   6/23/16   4/22/16   3/20/15   Bun 8 - 23 mg/dL 12 13 15 13 12 15 12   Cr 0.5 - 1.4 mg/dL 1.4 1.3 1.9  1.5  1.7  1.5  1.6    eGFR non Afr Am >60 mL/min/1.73 m^2 51.3  56.1  35.7  47 40.8  47.5  44.2            10. Horseshoe kidney  Stable and controlled, s/p surgery in the past . Continue current treatment plan as previously prescribed with your physicians.     11. Anemia, unspecified type  Stable and controlled. Continue current treatment plan as previously prescribed with your PCP.       Ref Range & Units     4/24/17 12/29/16 6/27/16 6/23/16 4/22/16   Hgb 14.0 - 18.0 g/dL 12.4  12.4  12.5  12.9  12.8    Hct 40.0 - 54.0 % 37.5  37.3  37.2  39.3  39.5            12. Idiopathic peripheral neuropathy  Stable and controlled. Continue current treatment plan as previously prescribed with your PCP.     13. Hiatal hernia with GERD  Stable and controlled  On omeprazole . Continue current treatment plan as previously prescribed with your PCP.     14. Hearing decreased, right  This is a new problem that has been identified during today's visit. Right ear could not hear with Whisper test. Please follow up with your PCP to discuss the next steps.      Provided Kodi Ribeiro with a 5-10 year written screening schedule and personal prevention plan. Recommendations were developed using the USPSTF age appropriate recommendations. Education, counseling, and referrals were provided as needed. After Visit Summary printed and given to patient which includes a list of additional screenings\tests needed.    Return in about 1 year (around 7/27/2018) for annual.    Yamileth Villanueva MD

## 2017-08-11 ENCOUNTER — HOSPITAL ENCOUNTER (INPATIENT)
Facility: HOSPITAL | Age: 67
LOS: 10 days | Discharge: NURSING, REHAB OR LONG-TERM CARE | DRG: 981 | End: 2017-08-21
Attending: EMERGENCY MEDICINE | Admitting: SURGERY
Payer: MEDICARE

## 2017-08-11 DIAGNOSIS — I65.22 LEFT CAROTID ARTERY STENOSIS: ICD-10-CM

## 2017-08-11 DIAGNOSIS — I63.412 CEREBRAL INFARCTION DUE TO EMBOLISM OF LEFT MIDDLE CEREBRAL ARTERY: ICD-10-CM

## 2017-08-11 DIAGNOSIS — K20.90 ESOPHAGITIS: ICD-10-CM

## 2017-08-11 DIAGNOSIS — R20.0 RIGHT UPPER EXTREMITY NUMBNESS: ICD-10-CM

## 2017-08-11 DIAGNOSIS — R07.9 CHEST PAIN: ICD-10-CM

## 2017-08-11 DIAGNOSIS — K56.50 SMALL BOWEL OBSTRUCTION DUE TO ADHESIONS: Primary | ICD-10-CM

## 2017-08-11 DIAGNOSIS — Z46.59 ENCOUNTER FOR NASOGASTRIC (NG) TUBE PLACEMENT: ICD-10-CM

## 2017-08-11 DIAGNOSIS — R11.10 VOMITING: ICD-10-CM

## 2017-08-11 DIAGNOSIS — N17.9 ACUTE KIDNEY INJURY: ICD-10-CM

## 2017-08-11 DIAGNOSIS — E86.0 DEHYDRATION: ICD-10-CM

## 2017-08-11 PROBLEM — K92.0 COFFEE GROUND EMESIS: Status: ACTIVE | Noted: 2017-08-11

## 2017-08-11 LAB
ABO + RH BLD: NORMAL
ALBUMIN SERPL BCP-MCNC: 3.8 G/DL
ALP SERPL-CCNC: 136 U/L
ALT SERPL W/O P-5'-P-CCNC: 17 U/L
ANION GAP SERPL CALC-SCNC: 13 MMOL/L
ANION GAP SERPL CALC-SCNC: 16 MMOL/L
APTT BLDCRRT: 32.3 SEC
AST SERPL-CCNC: 24 U/L
BACTERIA #/AREA URNS HPF: ABNORMAL /HPF
BASOPHILS # BLD AUTO: 0.01 K/UL
BASOPHILS # BLD AUTO: 0.03 K/UL
BASOPHILS NFR BLD: 0.1 %
BASOPHILS NFR BLD: 0.3 %
BILIRUB SERPL-MCNC: 0.7 MG/DL
BILIRUB UR QL STRIP: NEGATIVE
BLD GP AB SCN CELLS X3 SERPL QL: NORMAL
BUN SERPL-MCNC: 25 MG/DL
BUN SERPL-MCNC: 29 MG/DL
CALCIUM SERPL-MCNC: 8.2 MG/DL
CALCIUM SERPL-MCNC: 9.9 MG/DL
CHLORIDE SERPL-SCNC: 106 MMOL/L
CHLORIDE SERPL-SCNC: 109 MMOL/L
CLARITY UR: CLEAR
CO2 SERPL-SCNC: 18 MMOL/L
CO2 SERPL-SCNC: 19 MMOL/L
COLOR UR: YELLOW
CREAT SERPL-MCNC: 1.8 MG/DL
CREAT SERPL-MCNC: 2.4 MG/DL
DIFFERENTIAL METHOD: ABNORMAL
DIFFERENTIAL METHOD: ABNORMAL
EOSINOPHIL # BLD AUTO: 0 K/UL
EOSINOPHIL # BLD AUTO: 0.1 K/UL
EOSINOPHIL NFR BLD: 0.4 %
EOSINOPHIL NFR BLD: 0.8 %
ERYTHROCYTE [DISTWIDTH] IN BLOOD BY AUTOMATED COUNT: 13.9 %
ERYTHROCYTE [DISTWIDTH] IN BLOOD BY AUTOMATED COUNT: 14 %
EST. GFR  (AFRICAN AMERICAN): 31 ML/MIN/1.73 M^2
EST. GFR  (AFRICAN AMERICAN): 44 ML/MIN/1.73 M^2
EST. GFR  (NON AFRICAN AMERICAN): 27 ML/MIN/1.73 M^2
EST. GFR  (NON AFRICAN AMERICAN): 38 ML/MIN/1.73 M^2
GLUCOSE SERPL-MCNC: 129 MG/DL
GLUCOSE SERPL-MCNC: 130 MG/DL
GLUCOSE UR QL STRIP: NEGATIVE
HCT VFR BLD AUTO: 38.3 %
HCT VFR BLD AUTO: 42.2 %
HGB BLD-MCNC: 13 G/DL
HGB BLD-MCNC: 14.7 G/DL
HGB UR QL STRIP: ABNORMAL
HYALINE CASTS #/AREA URNS LPF: 1 /LPF
INR PPP: 1.1
KETONES UR QL STRIP: NEGATIVE
LEUKOCYTE ESTERASE UR QL STRIP: NEGATIVE
LIPASE SERPL-CCNC: 7 U/L
LYMPHOCYTES # BLD AUTO: 0.6 K/UL
LYMPHOCYTES # BLD AUTO: 0.9 K/UL
LYMPHOCYTES NFR BLD: 8 %
LYMPHOCYTES NFR BLD: 8.5 %
MCH RBC QN AUTO: 30.1 PG
MCH RBC QN AUTO: 30.6 PG
MCHC RBC AUTO-ENTMCNC: 33.9 G/DL
MCHC RBC AUTO-ENTMCNC: 34.8 G/DL
MCV RBC AUTO: 88 FL
MCV RBC AUTO: 89 FL
MICROSCOPIC COMMENT: ABNORMAL
MONOCYTES # BLD AUTO: 0.7 K/UL
MONOCYTES # BLD AUTO: 0.9 K/UL
MONOCYTES NFR BLD: 8.8 %
MONOCYTES NFR BLD: 9 %
NEUTROPHILS # BLD AUTO: 6.5 K/UL
NEUTROPHILS # BLD AUTO: 8.2 K/UL
NEUTROPHILS NFR BLD: 82 %
NEUTROPHILS NFR BLD: 82.5 %
NITRITE UR QL STRIP: NEGATIVE
PH UR STRIP: 6 [PH] (ref 5–8)
PLATELET # BLD AUTO: 128 K/UL
PLATELET # BLD AUTO: 174 K/UL
PMV BLD AUTO: 10.1 FL
PMV BLD AUTO: 10.1 FL
POTASSIUM SERPL-SCNC: 3.8 MMOL/L
POTASSIUM SERPL-SCNC: 4 MMOL/L
PROT SERPL-MCNC: 8.9 G/DL
PROT UR QL STRIP: ABNORMAL
PROTHROMBIN TIME: 11.6 SEC
RBC # BLD AUTO: 4.32 M/UL
RBC # BLD AUTO: 4.8 M/UL
RBC #/AREA URNS HPF: 6 /HPF (ref 0–4)
SODIUM SERPL-SCNC: 140 MMOL/L
SODIUM SERPL-SCNC: 141 MMOL/L
SP GR UR STRIP: 1.02 (ref 1–1.03)
SQUAMOUS #/AREA URNS HPF: 2 /HPF
URN SPEC COLLECT METH UR: ABNORMAL
UROBILINOGEN UR STRIP-ACNC: NEGATIVE EU/DL
WBC # BLD AUTO: 7.92 K/UL
WBC # BLD AUTO: 9.99 K/UL
WBC #/AREA URNS HPF: 0 /HPF (ref 0–5)

## 2017-08-11 PROCEDURE — 80053 COMPREHEN METABOLIC PANEL: CPT

## 2017-08-11 PROCEDURE — 63600175 PHARM REV CODE 636 W HCPCS: Performed by: EMERGENCY MEDICINE

## 2017-08-11 PROCEDURE — 96372 THER/PROPH/DIAG INJ SC/IM: CPT

## 2017-08-11 PROCEDURE — 85025 COMPLETE CBC W/AUTO DIFF WBC: CPT | Mod: 91

## 2017-08-11 PROCEDURE — 93010 ELECTROCARDIOGRAM REPORT: CPT | Mod: ,,, | Performed by: INTERNAL MEDICINE

## 2017-08-11 PROCEDURE — 86901 BLOOD TYPING SEROLOGIC RH(D): CPT

## 2017-08-11 PROCEDURE — 36415 COLL VENOUS BLD VENIPUNCTURE: CPT

## 2017-08-11 PROCEDURE — 25000003 PHARM REV CODE 250: Performed by: INTERNAL MEDICINE

## 2017-08-11 PROCEDURE — 85730 THROMBOPLASTIN TIME PARTIAL: CPT

## 2017-08-11 PROCEDURE — 21400001 HC TELEMETRY ROOM

## 2017-08-11 PROCEDURE — 11000001 HC ACUTE MED/SURG PRIVATE ROOM

## 2017-08-11 PROCEDURE — 93005 ELECTROCARDIOGRAM TRACING: CPT

## 2017-08-11 PROCEDURE — 86900 BLOOD TYPING SEROLOGIC ABO: CPT

## 2017-08-11 PROCEDURE — 25000003 PHARM REV CODE 250: Performed by: EMERGENCY MEDICINE

## 2017-08-11 PROCEDURE — 85610 PROTHROMBIN TIME: CPT

## 2017-08-11 PROCEDURE — 96361 HYDRATE IV INFUSION ADD-ON: CPT

## 2017-08-11 PROCEDURE — 81000 URINALYSIS NONAUTO W/SCOPE: CPT

## 2017-08-11 PROCEDURE — C9113 INJ PANTOPRAZOLE SODIUM, VIA: HCPCS | Performed by: EMERGENCY MEDICINE

## 2017-08-11 PROCEDURE — 83690 ASSAY OF LIPASE: CPT

## 2017-08-11 PROCEDURE — 96375 TX/PRO/DX INJ NEW DRUG ADDON: CPT

## 2017-08-11 PROCEDURE — 80048 BASIC METABOLIC PNL TOTAL CA: CPT

## 2017-08-11 PROCEDURE — 99285 EMERGENCY DEPT VISIT HI MDM: CPT | Mod: 25

## 2017-08-11 PROCEDURE — 96365 THER/PROPH/DIAG IV INF INIT: CPT

## 2017-08-11 PROCEDURE — 25500020 PHARM REV CODE 255: Performed by: EMERGENCY MEDICINE

## 2017-08-11 RX ORDER — SODIUM CHLORIDE 9 MG/ML
1000 INJECTION, SOLUTION INTRAVENOUS
Status: COMPLETED | OUTPATIENT
Start: 2017-08-11 | End: 2017-08-11

## 2017-08-11 RX ORDER — SODIUM CHLORIDE 9 MG/ML
1000 INJECTION, SOLUTION INTRAVENOUS CONTINUOUS
Status: DISCONTINUED | OUTPATIENT
Start: 2017-08-11 | End: 2017-08-11

## 2017-08-11 RX ORDER — PANTOPRAZOLE SODIUM 40 MG/10ML
40 INJECTION, POWDER, LYOPHILIZED, FOR SOLUTION INTRAVENOUS 2 TIMES DAILY
Status: DISCONTINUED | OUTPATIENT
Start: 2017-08-12 | End: 2017-08-21 | Stop reason: HOSPADM

## 2017-08-11 RX ORDER — HYDRALAZINE HYDROCHLORIDE 20 MG/ML
10 INJECTION INTRAMUSCULAR; INTRAVENOUS EVERY 8 HOURS PRN
Status: DISCONTINUED | OUTPATIENT
Start: 2017-08-11 | End: 2017-08-21 | Stop reason: HOSPADM

## 2017-08-11 RX ORDER — ACETAMINOPHEN 325 MG/1
650 TABLET ORAL EVERY 6 HOURS PRN
Status: DISCONTINUED | OUTPATIENT
Start: 2017-08-11 | End: 2017-08-19

## 2017-08-11 RX ORDER — DIPHENHYDRAMINE HYDROCHLORIDE 50 MG/ML
12.5 INJECTION INTRAMUSCULAR; INTRAVENOUS
Status: COMPLETED | OUTPATIENT
Start: 2017-08-11 | End: 2017-08-11

## 2017-08-11 RX ORDER — SODIUM CHLORIDE 9 MG/ML
INJECTION, SOLUTION INTRAVENOUS CONTINUOUS
Status: DISCONTINUED | OUTPATIENT
Start: 2017-08-11 | End: 2017-08-12

## 2017-08-11 RX ORDER — ONDANSETRON 2 MG/ML
4 INJECTION INTRAMUSCULAR; INTRAVENOUS EVERY 6 HOURS PRN
Status: DISCONTINUED | OUTPATIENT
Start: 2017-08-11 | End: 2017-08-18

## 2017-08-11 RX ORDER — GUAIFENESIN 100 MG/5ML
200 SOLUTION ORAL EVERY 4 HOURS PRN
Status: DISCONTINUED | OUTPATIENT
Start: 2017-08-11 | End: 2017-08-21 | Stop reason: HOSPADM

## 2017-08-11 RX ORDER — DICYCLOMINE HYDROCHLORIDE 10 MG/ML
20 INJECTION INTRAMUSCULAR
Status: COMPLETED | OUTPATIENT
Start: 2017-08-11 | End: 2017-08-11

## 2017-08-11 RX ORDER — ONDANSETRON 2 MG/ML
4 INJECTION INTRAMUSCULAR; INTRAVENOUS
Status: COMPLETED | OUTPATIENT
Start: 2017-08-11 | End: 2017-08-11

## 2017-08-11 RX ORDER — PANTOPRAZOLE SODIUM 40 MG/10ML
40 INJECTION, POWDER, LYOPHILIZED, FOR SOLUTION INTRAVENOUS
Status: COMPLETED | OUTPATIENT
Start: 2017-08-11 | End: 2017-08-11

## 2017-08-11 RX ORDER — MORPHINE SULFATE 4 MG/ML
4 INJECTION, SOLUTION INTRAMUSCULAR; INTRAVENOUS
Status: COMPLETED | OUTPATIENT
Start: 2017-08-11 | End: 2017-08-11

## 2017-08-11 RX ADMIN — SODIUM CHLORIDE: 0.9 INJECTION, SOLUTION INTRAVENOUS at 09:08

## 2017-08-11 RX ADMIN — SODIUM CHLORIDE 1000 ML: 0.9 INJECTION, SOLUTION INTRAVENOUS at 07:08

## 2017-08-11 RX ADMIN — BENZOCAINE: 200 SPRAY DENTAL; ORAL; PERIODONTAL at 07:08

## 2017-08-11 RX ADMIN — DICYCLOMINE HYDROCHLORIDE 20 MG: 10 INJECTION INTRAMUSCULAR at 05:08

## 2017-08-11 RX ADMIN — SODIUM CHLORIDE: 0.9 INJECTION, SOLUTION INTRAVENOUS at 10:08

## 2017-08-11 RX ADMIN — PANTOPRAZOLE SODIUM 40 MG: 40 INJECTION, POWDER, FOR SOLUTION INTRAVENOUS at 04:08

## 2017-08-11 RX ADMIN — MORPHINE SULFATE 4 MG: 4 INJECTION, SOLUTION INTRAMUSCULAR; INTRAVENOUS at 06:08

## 2017-08-11 RX ADMIN — IOHEXOL 30 ML: 350 INJECTION, SOLUTION INTRAVENOUS at 04:08

## 2017-08-11 RX ADMIN — SODIUM CHLORIDE 1000 ML: 0.9 INJECTION, SOLUTION INTRAVENOUS at 04:08

## 2017-08-11 RX ADMIN — ONDANSETRON 4 MG: 2 INJECTION INTRAMUSCULAR; INTRAVENOUS at 04:08

## 2017-08-11 RX ADMIN — DIPHENHYDRAMINE HYDROCHLORIDE 12.5 MG: 50 INJECTION, SOLUTION INTRAMUSCULAR; INTRAVENOUS at 06:08

## 2017-08-11 RX ADMIN — PROMETHAZINE HYDROCHLORIDE 25 MG: 25 INJECTION INTRAMUSCULAR; INTRAVENOUS at 05:08

## 2017-08-11 RX ADMIN — SODIUM CHLORIDE 1000 ML: 0.9 INJECTION, SOLUTION INTRAVENOUS at 03:08

## 2017-08-11 RX ADMIN — SODIUM CHLORIDE 1000 ML: 0.9 INJECTION, SOLUTION INTRAVENOUS at 05:08

## 2017-08-11 NOTE — ED PROVIDER NOTES
SCRIBE #1 NOTE: I, Juan Daniel Mcclendon, am scribing for, and in the presence of, Sindi Samson MD. I have scribed the entire note.      History      Chief Complaint   Patient presents with    Abdominal Pain     abd pain, nausea and vomiting since wednesday.        Review of patient's allergies indicates:   Allergen Reactions    Morphine Itching        HPI   HPI    8/11/2017, 2:45 PM   History obtained from the patient      History of Present Illness: Kodi Ribeiro is a 67 y.o. male patient who presents to the Emergency Department for generalized abd pain which onset gradually 2 days ago. Symptoms are constant and moderate in severity. No mitigating or exacerbating factors reported. Associated sxs include n/v and constipation. Patient denies any fever, chills, hematochezia, dysuria, hematuria, urinary frequency, diarrhea, and all other sxs at this time. No further complaints or concerns at this time.       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    Jejunal polyp     VCE 3/7/2014    Late complications of amputation stump     rseolved with further amputation( MRSA then none since 2014)    Lipoma of colon     colonoscopy 2/21/2014    Myocardial infarction     per patient 2000 & 9/2012    Peripheral vascular disease     Phantom limb syndrome     patient reports only intermittent not problematic, not worsening    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    Spinal cord  disease     L4L5 disc    Tobacco dependence     resolved    Ulcer of ileum     VCE 3/7/2014    Ureteral stent retained        Past Surgical History:  Past Surgical History:   Procedure Laterality Date    ABDOMINAL AORTIC ANEURYSM REPAIR      ABDOMINAL AORTIC ANEURYSM REPAIR  1996/2014    AMPUTATION      AORTA - BILATERAL FEMORAL ARTERY BYPASS GRAFT  2014    Left and right leg    CORONARY ANGIOPLASTY WITH STENT PLACEMENT  2000    Three placed in heart    FOOT AMPUTATION THROUGH METATARSAL  1996    left    FOOT SURGERY Bilateral 1980's    per patient multiple toe amputations prior to.  partial foot amputation:first great toe then other toes     KIDNEY SURGERY  2014    per patient separation of horseshoe kidney @ time of AAA repair    LUNG LOBECTOMY Right 1970s    per patient not cancer    right below knee amputation  2009 (approx)    SMALL INTESTINE SURGERY  2014    per patient partial @ time of aaa repair  not small bowel - large bowel bowel compromised bythtwe AAAbowel    TONSILLECTOMY  1955 aprox    URETERAL STENT PLACEMENT Left     annually replaced since 2012 or so  Dr Jin         Family History:  Family History   Problem Relation Age of Onset    Cancer Mother      lung    COPD Mother     Heart disease Father      MI but per patient bc of old age    Diabetes Daughter     Eczema Neg Hx     Lupus Neg Hx     Psoriasis Neg Hx     Melanoma Neg Hx     Kidney disease Neg Hx     Stroke Neg Hx     Mental illness Neg Hx     Mental retardation Neg Hx     Hypertension Neg Hx     Hyperlipidemia Neg Hx     Drug abuse Neg Hx     Alcohol abuse Neg Hx     Depression Neg Hx        Social History:  Social History     Social History Main Topics    Smoking status: Former Smoker     Packs/day: 1.00     Years: 15.00     Quit date: 1/1/2009    Smokeless tobacco: Never Used    Alcohol use No    Drug use: No      Comment: Is on prescription opiod, no non prescribed use    Sexual activity: Not  Currently     Partners: Female       ROS   Review of Systems   Constitutional: Negative for chills and fever.   HENT: Negative for sore throat.    Respiratory: Negative for shortness of breath.    Cardiovascular: Negative for chest pain.   Gastrointestinal: Positive for abdominal pain, constipation, nausea and vomiting. Negative for blood in stool and diarrhea.   Genitourinary: Negative for dysuria, frequency and hematuria.   Musculoskeletal: Negative for back pain.   Skin: Negative for rash.   Neurological: Negative for weakness.   Hematological: Does not bruise/bleed easily.   All other systems reviewed and are negative.      Physical Exam      Initial Vitals [08/11/17 1435]   BP Pulse Resp Temp SpO2   (!) 150/87 92 16 98.3 °F (36.8 °C) 97 %      MAP       108          Physical Exam  Nursing Notes and Vital Signs Reviewed.  Constitutional: Patient is in no acute distress. Well-developed and well-nourished.  Head: Atraumatic. Normocephalic.  Eyes: PERRL. EOM intact. Conjunctivae are not pale. No scleral icterus.  ENT: Mucous membranes are moist.    Neck: Supple. Full ROM. No lymphadenopathy.  Cardiovascular: Regular rate. Regular rhythm. No murmurs, rubs, or gallops.  Pulmonary/Chest: No respiratory distress.   Abdominal:  Bowel sounds are diminished.  Soft and non-distended.  There is no tenderness.  No rebound, guarding, or rigidity.  Mulitple old abdominal surgical scars noted.   Musculoskeletal: Moves all extremities. No obvious deformities. No edema. R below the  Knee amputation. L foot amputation.   Skin: Warm and dry.  Neurological:  Alert, awake, and appropriate.  Normal speech.  No acute focal neurological deficits are appreciated.  Psychiatric: Normal affect. Good eye contact. Appropriate in content.    ED Course    Procedures  ED Vital Signs:  Vitals:    08/11/17 1435 08/11/17 1617 08/11/17 1717 08/11/17 1821   BP: (!) 150/87 (!) 162/77 (!) 159/99 (!) 156/86   Pulse: 92 81 88 101   Resp: 16      Temp:  "98.3 °F (36.8 °C)      TempSrc: Oral      SpO2: 97% 98% 97%    Weight: 90.7 kg (200 lb)      Height: 6' 1" (1.854 m)       08/11/17 1847   BP: (!) 167/84   Pulse: 98   Resp: 17   Temp:    TempSrc:    SpO2: 96%   Weight:    Height:        Abnormal Lab Results:  Labs Reviewed   CBC W/ AUTO DIFFERENTIAL - Abnormal; Notable for the following:        Result Value    Gran # 8.2 (*)     Lymph # 0.9 (*)     Gran% 82.0 (*)     Lymph% 8.5 (*)     All other components within normal limits   COMPREHENSIVE METABOLIC PANEL - Abnormal; Notable for the following:     CO2 19 (*)     Glucose 130 (*)     BUN, Bld 29 (*)     Creatinine 2.4 (*)     Total Protein 8.9 (*)     Alkaline Phosphatase 136 (*)     eGFR if  31 (*)     eGFR if non  27 (*)     All other components within normal limits   APTT - Abnormal; Notable for the following:     aPTT 32.3 (*)     All other components within normal limits   LIPASE   APTT   PROTIME-INR   PROTIME-INR   URINALYSIS   CBC W/ AUTO DIFFERENTIAL   BASIC METABOLIC PANEL        All Lab Results:  Results for orders placed or performed during the hospital encounter of 08/11/17   CBC W/ AUTO DIFFERENTIAL   Result Value Ref Range    WBC 9.99 3.90 - 12.70 K/uL    RBC 4.80 4.60 - 6.20 M/uL    Hemoglobin 14.7 14.0 - 18.0 g/dL    Hematocrit 42.2 40.0 - 54.0 %    MCV 88 82 - 98 fL    MCH 30.6 27.0 - 31.0 pg    MCHC 34.8 32.0 - 36.0 g/dL    RDW 14.0 11.5 - 14.5 %    Platelets 174 150 - 350 K/uL    MPV 10.1 9.2 - 12.9 fL    Gran # 8.2 (H) 1.8 - 7.7 K/uL    Lymph # 0.9 (L) 1.0 - 4.8 K/uL    Mono # 0.9 0.3 - 1.0 K/uL    Eos # 0.0 0.0 - 0.5 K/uL    Baso # 0.03 0.00 - 0.20 K/uL    Gran% 82.0 (H) 38.0 - 73.0 %    Lymph% 8.5 (L) 18.0 - 48.0 %    Mono% 8.8 4.0 - 15.0 %    Eosinophil% 0.4 0.0 - 8.0 %    Basophil% 0.3 0.0 - 1.9 %    Differential Method Automated    Comp. Metabolic Panel   Result Value Ref Range    Sodium 141 136 - 145 mmol/L    Potassium 4.0 3.5 - 5.1 mmol/L    Chloride 106 " 95 - 110 mmol/L    CO2 19 (L) 23 - 29 mmol/L    Glucose 130 (H) 70 - 110 mg/dL    BUN, Bld 29 (H) 8 - 23 mg/dL    Creatinine 2.4 (H) 0.5 - 1.4 mg/dL    Calcium 9.9 8.7 - 10.5 mg/dL    Total Protein 8.9 (H) 6.0 - 8.4 g/dL    Albumin 3.8 3.5 - 5.2 g/dL    Total Bilirubin 0.7 0.1 - 1.0 mg/dL    Alkaline Phosphatase 136 (H) 55 - 135 U/L    AST 24 10 - 40 U/L    ALT 17 10 - 44 U/L    Anion Gap 16 8 - 16 mmol/L    eGFR if African American 31 (A) >60 mL/min/1.73 m^2    eGFR if non African American 27 (A) >60 mL/min/1.73 m^2   Lipase   Result Value Ref Range    Lipase 7 4 - 60 U/L   APTT   Result Value Ref Range    aPTT 32.3 (H) 21.0 - 32.0 sec   Protime-INR   Result Value Ref Range    Prothrombin Time 11.6 9.0 - 12.5 sec    INR 1.1 0.8 - 1.2       Imaging Results:  Imaging Results          CT Abdomen Pelvis  Without Contrast (Final result)  Result time 08/11/17 18:38:49   Procedure changed from CT Abdomen Pelvis With Contrast     Final result by Stefan Gagnon MD (08/11/17 18:38:49)                 Impression:     Mid small bowel obstruction, likely related to postoperative adhesions. See above. No free air or pneumatosis identified.      Electronically signed by: STEFAN GAGNON MD  Date:     08/11/17  Time:    18:38              Narrative:    Examination: CT of the abdomen and pelvis without contrast.    History: Vomiting    Findings: Comparison is made to prior examination dated 03/20/2000 1534 she kidney again noted. Left ureteral stent remains in place. Postoperative changes again noted in the midline retroperitoneum.    Mid to proximal small bowel loops are dilated, with marked distention of the stomach as well. Contrast noted in the distal esophagus, likely related to reflux. Distal small bowel loops are decompressed, with transition point noted at the lower midline retroperitoneum, at the site of prior surgery. No free air or pneumatosis, and otherwise no significant change from prior exam.                              X-Ray Abdomen Flat And Erect (Final result)  Result time 08/11/17 15:53:16    Final result by Hill Mario MD (08/11/17 15:53:16)                 Impression:      Bowel gas pattern may be due to an ileus.  Small bowel obstruction not excluded.    Other findings as described above.      Electronically signed by: HILL MARIO MD  Date:     08/11/17  Time:    15:53              Narrative:    EXAM:  2 view abdomen, flat and erect    CLINICAL HISTORY: abdominal pain, unspecified without report of trauma    COMPARISON STUDIES: Abdominal series 04/22/2016    FINDINGS:    Abdomen: No definite free air is seen. Several surgical clips within the midabdomen.  Left ureteral stent.  atherosclerosis.    There are several air-fluid levels within the abdomen.  There are dilated small bowel loops..                             X-Ray Chest PA And Lateral (Final result)  Result time 08/11/17 15:51:11    Final result by Hill Mario MD (08/11/17 15:51:11)                 Impression:      No acute cardiopulmonary findings.  Please see above      Electronically signed by: HILL MARIO MD  Date:     08/11/17  Time:    15:51              Narrative:    EXAM: Chest X-ray PA and Lateral    CLINICAL HISTORY:     Vomiting, unspecified    COMPARISON STUDIES:     04/24/2017    FINDINGS:    Surgical clips in the right axilla.  There are surgical staples in the right lung.  Chronic blunting of the right costophrenic sulcus.  Left lung appears clear.  Normal heart size.. Chronic right rib deformities probably from previous thoracotomy.  Coronary artery stent.  Chronic compression of one of the midthoracic vertebral bodies.  Surgical staples and a ureteral stent is noted on the lateral view..                             The EKG was ordered, reviewed, and independently interpreted by the ED provider.  Interpretation time: 15:20  Rate: 89 BPM  Rhythm: normal sinus rhythm  Interpretation: Possible left atrial enlargement. No STEMI.          The Emergency Provider reviewed the vital signs and test results, which are outlined above.    ED Discussion     4:37 PM: Re-evaluated pt. Pt is resting comfortably and is in no acute distress. D/w pt all pertinent results. D/w pt any concerns expressed at this time. Answered all questions. Pt expresses understanding at this time.    5:37 PM: Re-evaluated pt. Pt is resting comfortably and is in no acute distress. Pt has had a few episodes of coffee ground emesis. Pt denies having any black or bloody stools. D/w pt all pertinent results. D/w pt any concerns expressed at this time. Answered all questions. Pt expresses understanding at this time.    6:43 PM: Re-evaluated pt. Pt is resting comfortably and is in no acute distress. D/w pt all pertinent results. D/w pt any concerns expressed at this time. Answered all questions. Pt expresses understanding at this time.    6:57 PM: Dr. Samson discussed the pt's case with Dr. Tanner (GI) who recommends pt be continued on Protonix and placement of an NG tube.    7:13 PM: Discussed case with Dr. Cook (General Surgery). Dr. Cook agrees with current care and management of pt and accepts admission.   Admitting Service: General Surgery   Admitting Physician: Joyce  Admit to: General Surgery     7:19 PM: Dr. Samson discussed the pt's case with Dr. Alvarado ([facility]) to consult on the pt's case with Dr. Cook    7:27 PM: Re-evaluated pt. I have discussed test results, shared treatment plan, and the need for admission with patient and family at bedside. Pt and family express understanding at this time and agree with all information. All questions answered. Pt and family have no further questions or concerns at this time. Pt is ready for admit.      ED Medication(s):  Medications   sodium chloride 0.9% bolus 1,000 mL (0 mLs Intravenous Stopped 8/11/17 1653)   sodium chloride 0.9% bolus 1,000 mL (0 mLs Intravenous Stopped 8/11/17 1755)   ondansetron injection 4 mg (4 mg  Intravenous Given 8/11/17 1647)   pantoprazole injection 40 mg (40 mg Intravenous Given 8/11/17 1649)   dicyclomine injection 20 mg (20 mg Intramuscular Given 8/11/17 1703)   promethazine (PHENERGAN) 25 mg in dextrose 5 % 50 mL IVPB (0 mg Intravenous Stopped 8/11/17 1812)   0.9%  NaCl infusion (1,000 mLs Intravenous New Bag 8/11/17 1752)   omnipaque 350 iohexol 30 mL (30 mLs Oral Given 8/11/17 1613)   morphine injection 4 mg (4 mg Intravenous Given 8/11/17 1847)   diphenhydrAMINE injection 12.5 mg (12.5 mg Intravenous Given 8/11/17 1847)   benzocaine 20 % mouth spray ( Mouth/Throat Given 8/11/17 1922)         Medical Decision Making    Medical Decision Making:   Clinical Tests:   Lab Tests: Ordered and Reviewed  Radiological Study: Reviewed and Ordered  Medical Tests: Reviewed and Ordered           Scribe Attestation:   Scribe #1: I performed the above scribed service and the documentation accurately describes the services I performed. I attest to the accuracy of the note.    Attending:   Physician Attestation Statement for Scribe #1: I, Sindi Samson MD, personally performed the services described in this documentation, as scribed by Juan Daniel Mcclendon, in my presence, and it is both accurate and complete.          Clinical Impression       ICD-10-CM ICD-9-CM   1. Small bowel obstruction due to adhesions K56.5 560.81   2. Vomiting R11.10 787.03   3. Acute kidney injury N17.9 584.9   4. Dehydration E86.0 276.51   5. Esophagitis K20.9 530.10       Disposition:   Disposition: Admitted (general surgery)  Condition: Fair         Sindi Samson MD  08/11/17 1932

## 2017-08-12 PROBLEM — Z01.810 PREOP CARDIOVASCULAR EXAM: Status: RESOLVED | Noted: 2017-04-27 | Resolved: 2017-08-12

## 2017-08-12 PROBLEM — K92.0 COFFEE GROUND EMESIS: Status: RESOLVED | Noted: 2017-08-11 | Resolved: 2017-08-12

## 2017-08-12 LAB
ALBUMIN SERPL BCP-MCNC: 3.1 G/DL
ALP SERPL-CCNC: 98 U/L
ALT SERPL W/O P-5'-P-CCNC: 13 U/L
ANION GAP SERPL CALC-SCNC: 11 MMOL/L
AST SERPL-CCNC: 27 U/L
BASOPHILS # BLD AUTO: 0.01 K/UL
BASOPHILS NFR BLD: 0.2 %
BILIRUB SERPL-MCNC: 0.5 MG/DL
BUN SERPL-MCNC: 24 MG/DL
CALCIUM SERPL-MCNC: 8.5 MG/DL
CHLORIDE SERPL-SCNC: 111 MMOL/L
CO2 SERPL-SCNC: 19 MMOL/L
CREAT SERPL-MCNC: 1.6 MG/DL
DIFFERENTIAL METHOD: ABNORMAL
EOSINOPHIL # BLD AUTO: 0.1 K/UL
EOSINOPHIL NFR BLD: 2.5 %
ERYTHROCYTE [DISTWIDTH] IN BLOOD BY AUTOMATED COUNT: 14 %
EST. GFR  (AFRICAN AMERICAN): 50 ML/MIN/1.73 M^2
EST. GFR  (NON AFRICAN AMERICAN): 44 ML/MIN/1.73 M^2
GLUCOSE SERPL-MCNC: 123 MG/DL
HCT VFR BLD AUTO: 34.5 %
HCT VFR BLD AUTO: 35.8 %
HGB BLD-MCNC: 11.3 G/DL
HGB BLD-MCNC: 11.9 G/DL
LYMPHOCYTES # BLD AUTO: 0.9 K/UL
LYMPHOCYTES NFR BLD: 21.3 %
MAGNESIUM SERPL-MCNC: 1.4 MG/DL
MCH RBC QN AUTO: 29.8 PG
MCHC RBC AUTO-ENTMCNC: 33.2 G/DL
MCV RBC AUTO: 90 FL
MONOCYTES # BLD AUTO: 0.7 K/UL
MONOCYTES NFR BLD: 17.9 %
NEUTROPHILS # BLD AUTO: 2.3 K/UL
NEUTROPHILS NFR BLD: 58.1 %
PHOSPHATE SERPL-MCNC: 2.8 MG/DL
PLATELET # BLD AUTO: 134 K/UL
PMV BLD AUTO: 10.1 FL
POTASSIUM SERPL-SCNC: 3.7 MMOL/L
PROT SERPL-MCNC: 7 G/DL
RBC # BLD AUTO: 3.99 M/UL
SODIUM SERPL-SCNC: 141 MMOL/L
WBC # BLD AUTO: 4.03 K/UL

## 2017-08-12 PROCEDURE — 63600175 PHARM REV CODE 636 W HCPCS: Performed by: INTERNAL MEDICINE

## 2017-08-12 PROCEDURE — 85014 HEMATOCRIT: CPT

## 2017-08-12 PROCEDURE — 85018 HEMOGLOBIN: CPT

## 2017-08-12 PROCEDURE — 63600175 PHARM REV CODE 636 W HCPCS: Performed by: EMERGENCY MEDICINE

## 2017-08-12 PROCEDURE — 99223 1ST HOSP IP/OBS HIGH 75: CPT | Mod: ,,, | Performed by: SURGERY

## 2017-08-12 PROCEDURE — 84100 ASSAY OF PHOSPHORUS: CPT

## 2017-08-12 PROCEDURE — 25000003 PHARM REV CODE 250: Performed by: INTERNAL MEDICINE

## 2017-08-12 PROCEDURE — 99222 1ST HOSP IP/OBS MODERATE 55: CPT | Mod: ,,, | Performed by: INTERNAL MEDICINE

## 2017-08-12 PROCEDURE — 80053 COMPREHEN METABOLIC PANEL: CPT

## 2017-08-12 PROCEDURE — C9113 INJ PANTOPRAZOLE SODIUM, VIA: HCPCS | Performed by: EMERGENCY MEDICINE

## 2017-08-12 PROCEDURE — 25000003 PHARM REV CODE 250: Performed by: SURGERY

## 2017-08-12 PROCEDURE — 11000001 HC ACUTE MED/SURG PRIVATE ROOM

## 2017-08-12 PROCEDURE — 21400001 HC TELEMETRY ROOM

## 2017-08-12 PROCEDURE — 63600175 PHARM REV CODE 636 W HCPCS: Performed by: SURGERY

## 2017-08-12 PROCEDURE — 85025 COMPLETE CBC W/AUTO DIFF WBC: CPT

## 2017-08-12 PROCEDURE — 83735 ASSAY OF MAGNESIUM: CPT

## 2017-08-12 PROCEDURE — 36415 COLL VENOUS BLD VENIPUNCTURE: CPT

## 2017-08-12 PROCEDURE — 25000003 PHARM REV CODE 250: Performed by: EMERGENCY MEDICINE

## 2017-08-12 RX ORDER — SODIUM CHLORIDE, SODIUM LACTATE, POTASSIUM CHLORIDE, CALCIUM CHLORIDE 600; 310; 30; 20 MG/100ML; MG/100ML; MG/100ML; MG/100ML
INJECTION, SOLUTION INTRAVENOUS CONTINUOUS
Status: DISCONTINUED | OUTPATIENT
Start: 2017-08-12 | End: 2017-08-19

## 2017-08-12 RX ORDER — HYDROMORPHONE HYDROCHLORIDE 1 MG/ML
1 INJECTION, SOLUTION INTRAMUSCULAR; INTRAVENOUS; SUBCUTANEOUS
Status: DISCONTINUED | OUTPATIENT
Start: 2017-08-12 | End: 2017-08-19

## 2017-08-12 RX ADMIN — ONDANSETRON 4 MG: 2 INJECTION INTRAMUSCULAR; INTRAVENOUS at 01:08

## 2017-08-12 RX ADMIN — PROMETHAZINE HYDROCHLORIDE 25 MG: 25 INJECTION INTRAMUSCULAR; INTRAVENOUS at 03:08

## 2017-08-12 RX ADMIN — HYDROMORPHONE HYDROCHLORIDE 1 MG: 1 INJECTION, SOLUTION INTRAMUSCULAR; INTRAVENOUS; SUBCUTANEOUS at 06:08

## 2017-08-12 RX ADMIN — HYDROMORPHONE HYDROCHLORIDE 1 MG: 1 INJECTION, SOLUTION INTRAMUSCULAR; INTRAVENOUS; SUBCUTANEOUS at 02:08

## 2017-08-12 RX ADMIN — SODIUM CHLORIDE: 0.9 INJECTION, SOLUTION INTRAVENOUS at 10:08

## 2017-08-12 RX ADMIN — PANTOPRAZOLE SODIUM 40 MG: 40 INJECTION, POWDER, FOR SOLUTION INTRAVENOUS at 09:08

## 2017-08-12 RX ADMIN — SODIUM CHLORIDE, SODIUM LACTATE, POTASSIUM CHLORIDE, AND CALCIUM CHLORIDE: 600; 310; 30; 20 INJECTION, SOLUTION INTRAVENOUS at 11:08

## 2017-08-12 RX ADMIN — HYDROMORPHONE HYDROCHLORIDE 1 MG: 1 INJECTION, SOLUTION INTRAMUSCULAR; INTRAVENOUS; SUBCUTANEOUS at 10:08

## 2017-08-12 RX ADMIN — HYDROMORPHONE HYDROCHLORIDE 1 MG: 1 INJECTION, SOLUTION INTRAMUSCULAR; INTRAVENOUS; SUBCUTANEOUS at 09:08

## 2017-08-12 RX ADMIN — PANTOPRAZOLE SODIUM 40 MG: 40 INJECTION, POWDER, FOR SOLUTION INTRAVENOUS at 04:08

## 2017-08-12 NOTE — ED NOTES
Ng tube placed at 60cm to right nare, secured by nasal clamp,confirmed placement by auscultation, ph test at 4,and chest xray called for radiographic confirmation

## 2017-08-12 NOTE — PROGRESS NOTES
"[facility] Hospital Medicine  Progress Note    Patient Name: Kodi Ribeiro  MRN: [id number]  Patient Class: IP- Inpatient   Admission Date: 8/11/2017  Length of Stay: 1 days  Attending Physician: Ta Cook MD  Primary Care Provider: Norma Nuñez MD        Subjective:     Principal Problem:Small bowel obstruction due to adhesions    HPI:  Mr. Ribeiro is a 66 y/o  male with h/o CAD, PAD, cardiac stents, CKD3, HTN, right BKA, AAA repair complicated by SBO, requiring surgery, left ureteral stent placement, presents to the ED c/o abdominal discomfort, nausea and vomiting since yesterday morning 1 AM. He thought it was "stomach bug" and would pass, but continued to get worse, hence presented to the ED. Last BM 3 days ago (wednesday).    X-Ray abdomen shows "several air-fluid levels within the abdomen.  There are dilated small bowel loops."    CT abdomen reveals "Mid to proximal small bowel loops are dilated, with marked distention of the stomach as well. Contrast noted in the distal esophagus, likely related to reflux. Distal small bowel loops are decompressed, with transition point noted at the lower midline retroperitoneum, at the site of prior surgery".     was contacted who will admit the patient. [facility] was consulted for medical management. Patient just had NG tube placed.    In the ED today, patient had coffee-ground emesis in the ED.  was consulted who recommend supportive care at this time.      Hospital Course:  8/12/17 No acute issues overnight. Pt reports pain is improved. Continue NG decompression.     Interval History: No acute issues overnight. Pt reports pain is improved. Continue NG decompression.     Review of Systems   Constitutional: Negative.  Negative for activity change, appetite change, chills, fatigue, fever and unexpected weight change.   HENT: Negative.  Negative for congestion, facial swelling, nosebleeds, rhinorrhea, sinus pressure, sneezing and sore " throat.    Eyes: Negative.  Negative for photophobia, discharge, redness and visual disturbance.   Respiratory: Negative.  Negative for apnea, cough, chest tightness, shortness of breath, wheezing and stridor.    Cardiovascular: Negative.  Negative for chest pain, palpitations and leg swelling.   Gastrointestinal: Positive for abdominal pain (discomfort), nausea and vomiting. Negative for abdominal distention, anal bleeding, blood in stool, constipation and diarrhea.   Endocrine: Negative.  Negative for polydipsia, polyphagia and polyuria.   Genitourinary: Negative.  Negative for difficulty urinating, discharge, dysuria, flank pain, frequency, hematuria and urgency.   Musculoskeletal: Negative.  Negative for arthralgias, back pain, gait problem, joint swelling, myalgias, neck pain and neck stiffness.   Skin: Negative.  Negative for color change, pallor and rash.   Allergic/Immunologic: Negative.  Negative for environmental allergies, food allergies and immunocompromised state.   Neurological: Negative.  Negative for dizziness, tremors, seizures, syncope, facial asymmetry, speech difficulty, weakness, light-headedness, numbness and headaches.   Hematological: Negative.    Psychiatric/Behavioral: Negative.  Negative for behavioral problems, confusion, hallucinations and suicidal ideas. The patient is not nervous/anxious.    All other systems reviewed and are negative.    Objective:     Vital Signs (Most Recent):  Temp: 97.6 °F (36.4 °C) (08/12/17 1242)  Pulse: 82 (08/12/17 1242)  Resp: 18 (08/12/17 1242)  BP: (!) 152/74 (08/12/17 1242)  SpO2: 96 % (08/12/17 1242) Vital Signs (24h Range):  Temp:  [97.4 °F (36.3 °C)-98.8 °F (37.1 °C)] 97.6 °F (36.4 °C)  Pulse:  [] 82  Resp:  [15-18] 18  SpO2:  [93 %-98 %] 96 %  BP: (150-167)/(70-99) 152/74     Weight: 90.7 kg (200 lb)  Body mass index is 26.39 kg/m².    Intake/Output Summary (Last 24 hours) at 08/12/17 1344  Last data filed at 08/12/17 1242   Gross per 24 hour    Intake              750 ml   Output              950 ml   Net             -200 ml      Physical Exam   Constitutional: He is oriented to person, place, and time. He appears well-developed and well-nourished. No distress.   Appears uncomfortable, NGT in place.   HENT:   Head: Normocephalic and atraumatic.   Eyes: Conjunctivae and EOM are normal. Pupils are equal, round, and reactive to light. No scleral icterus.   Neck: Normal range of motion. Neck supple. No thyromegaly present.   Cardiovascular: Normal rate, regular rhythm, normal heart sounds and intact distal pulses.    No murmur heard.  Pulmonary/Chest: Effort normal and breath sounds normal. No respiratory distress. He has no wheezes. He exhibits no tenderness.   Abdominal: Soft. He exhibits no distension. Bowel sounds are decreased. There is no tenderness.   Musculoskeletal: Normal range of motion. He exhibits no edema, tenderness or deformity.   Right BKA, left foot amputation   Lymphadenopathy:     He has no cervical adenopathy.   Neurological: He is alert and oriented to person, place, and time. No cranial nerve deficit. He exhibits normal muscle tone. Coordination normal.   Skin: Skin is warm and dry. No rash noted. He is not diaphoretic. No erythema.   Psychiatric: He has a normal mood and affect. His behavior is normal. Judgment and thought content normal.   Nursing note and vitals reviewed.      Significant Labs: All pertinent labs within the past 24 hours have been reviewed.    Significant Imaging:   Imaging Results          X-Ray Chest 1 View (Final result)  Result time 08/11/17 19:56:15    Final result by Stefan Gagnon MD (08/11/17 19:56:15)                 Impression:     Tip of the NG tube not visualized. See above.      Electronically signed by: STEFAN GAGNON MD  Date:     08/11/17  Time:    19:56              Narrative:    Exam: Chest X-ray, one view.    History: Encounter for fitting and adjustment of other gastrointestinal appliance and  device    Findings: Comparison is made to prior examination at 1536 hrs. NG tube has been placed, extending into the distal esophagus, tip not visualized. Consider AP view of the abdomen for further assessment.                             CT Abdomen Pelvis  Without Contrast (Final result)  Result time 08/11/17 18:38:49   Procedure changed from CT Abdomen Pelvis With Contrast     Final result by Stefan Gagnon MD (08/11/17 18:38:49)                 Impression:     Mid small bowel obstruction, likely related to postoperative adhesions. See above. No free air or pneumatosis identified.      Electronically signed by: STEFAN GAGNON MD  Date:     08/11/17  Time:    18:38              Narrative:    Examination: CT of the abdomen and pelvis without contrast.    History: Vomiting    Findings: Comparison is made to prior examination dated 03/20/2000 1534 she kidney again noted. Left ureteral stent remains in place. Postoperative changes again noted in the midline retroperitoneum.    Mid to proximal small bowel loops are dilated, with marked distention of the stomach as well. Contrast noted in the distal esophagus, likely related to reflux. Distal small bowel loops are decompressed, with transition point noted at the lower midline retroperitoneum, at the site of prior surgery. No free air or pneumatosis, and otherwise no significant change from prior exam.                             X-Ray Abdomen Flat And Erect (Final result)  Result time 08/11/17 15:53:16    Final result by Hill Gastelum MD (08/11/17 15:53:16)                 Impression:      Bowel gas pattern may be due to an ileus.  Small bowel obstruction not excluded.    Other findings as described above.      Electronically signed by: HILL GASTELUM MD  Date:     08/11/17  Time:    15:53              Narrative:    EXAM:  2 view abdomen, flat and erect    CLINICAL HISTORY: abdominal pain, unspecified without report of trauma    COMPARISON STUDIES: Abdominal  series 04/22/2016    FINDINGS:    Abdomen: No definite free air is seen. Several surgical clips within the midabdomen.  Left ureteral stent.  atherosclerosis.    There are several air-fluid levels within the abdomen.  There are dilated small bowel loops..                             X-Ray Chest PA And Lateral (Final result)  Result time 08/11/17 15:51:11    Final result by Hill Mario MD (08/11/17 15:51:11)                 Impression:      No acute cardiopulmonary findings.  Please see above      Electronically signed by: HILL MARIO MD  Date:     08/11/17  Time:    15:51              Narrative:    EXAM: Chest X-ray PA and Lateral    CLINICAL HISTORY:     Vomiting, unspecified    COMPARISON STUDIES:     04/24/2017    FINDINGS:    Surgical clips in the right axilla.  There are surgical staples in the right lung.  Chronic blunting of the right costophrenic sulcus.  Left lung appears clear.  Normal heart size.. Chronic right rib deformities probably from previous thoracotomy.  Coronary artery stent.  Chronic compression of one of the midthoracic vertebral bodies.  Surgical staples and a ureteral stent is noted on the lateral view..                                 Assessment/Plan:      * Small bowel obstruction due to adhesions    - General Surgery following   - NGT in place.  - Supportive care  - IV fluids          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    - H/o Right BKA          CAD;Old MI ; s/p stents x 3 (2000)     - Hold anti-platelet agents  - Denies chest pain or SOB at this time.          CKD (chronic kidney disease) stage 3, GFR 30-59 ml/min    - Serum creatinine at baseline 1.8.  - Gentle IV hydration  - Monitor, labs in AM          Essential hypertension    - Hydralazine IV prn  - Hold oral BP medications while NGT in place  - Monitor and adjust as needed            VTE Risk Mitigation         Ordered     Medium Risk of VTE  Once      08/11/17 2210     Place sequential  compression device  Until discontinued      08/11/17 2210     Reason for No Pharmacological VTE Prophylaxis  Once      08/11/17 2210     Place sequential compression device  Until discontinued      08/11/17 2210              Brennen Weiner NP  Department of Hospital Medicine   [facility]

## 2017-08-12 NOTE — HPI
Mr. Ross is well known to me although I have not seen him in about 2 years. He has a fairly complicated past surgical history but had been doing quite well until Wednesday night when he developed fairly sudden onset generalized abdominal pain followed by severe, persistent vomiting. He reports that initially the emesis was clear and yellow, but later became black in color. There was no blood in the emesis initially. The pain and vomiting persisted so he came to the ED yesterday. While in the ED he was noted to have some coffee ground emesis. There has never been any red blood in the emesis. He denies any melena or hematochezia prior to these events. He has not had a bowel movement since Wednesday and is not passing gas. He has an NG tube in place and is feeling better since that has been suctioning gastric contents. No F/C/CP/SOB.

## 2017-08-12 NOTE — HPI
"Mr. Ribeiro is a 68 y/o  male with h/o CAD, PAD, cardiac stents, CKD3, HTN, right BKA, AAA repair complicated by SBO, requiring surgery, left ureteral stent placement, presents to the ED c/o abdominal discomfort, nausea and vomiting since yesterday morning 1 AM. He thought it was "stomach bug" and would pass, but continued to get worse, hence presented to the ED. Last BM 3 days ago (wednesday).    X-Ray abdomen shows "several air-fluid levels within the abdomen.  There are dilated small bowel loops."    CT abdomen reveals "Mid to proximal small bowel loops are dilated, with marked distention of the stomach as well. Contrast noted in the distal esophagus, likely related to reflux. Distal small bowel loops are decompressed, with transition point noted at the lower midline retroperitoneum, at the site of prior surgery".     was contacted who will admit the patient. [facility] was consulted for medical management. Patient just had NG tube placed.    In the ED today, patient had coffee-ground emesis in the ED.  was consulted who recommend supportive care at this time.    "

## 2017-08-12 NOTE — PLAN OF CARE
Problem: Fall Risk (Adult)  Intervention: Safety Precautions  Chart check done      Comments: Chart check complete and POC reviewed with patient and spouse.  NG to LIWS with minimal output of dark, deep red output visible in tubing. Patient denies any pain and states verbally he is comfortable and ready to sleep. Nausea subsided in the ER and wife stated she will go home so patient can rest.  Call bell in reach, patient placed in trapeze bed for easier bed mobility due to right BKA. Infusion of fluids rate 100ml NS per hour and heart monitor on. Vitals being acquired every 4 hours on the unit and prn medications for hypertension available if needed.  NPO status explained and understood verbally. POC on patient white board in the room and rounding completed. SCD on the left left refused at the admission but available if desired later. Patient has urinal on bedside table and states LBM 3 days ago.

## 2017-08-12 NOTE — ASSESSMENT & PLAN NOTE
Minimal coffee grounds in mostly bilious NG tube output. No red blood output. Coffee grounds did not start until after >24h of vomiting in the setting of Plavix use. Slight decline in hgb. At this point would continue IV PPI q12h and monitor hgb q8h. No signs of significant bleeding and no evidence of hemodynamic impact. No plans for EGD at this time as benefits are outweighed by risk of endoscopy in pt with SBO.

## 2017-08-12 NOTE — SUBJECTIVE & OBJECTIVE
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    Jejunal polyp     VCE 3/7/2014    Late complications of amputation stump     rseolved with further amputation( MRSA then none since 2014)    Lipoma of colon     colonoscopy 2/21/2014    Myocardial infarction     per patient 2000 & 9/2012    Peripheral vascular disease     Phantom limb syndrome     patient reports only intermittent not problematic, not worsening    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    Spinal cord disease     L4L5 disc    Tobacco dependence     resolved    Ulcer of ileum     VCE 3/7/2014    Ureteral stent retained        Past Surgical History:   Procedure Laterality Date    ABDOMINAL AORTIC ANEURYSM REPAIR      ABDOMINAL AORTIC ANEURYSM REPAIR  1996/2014    AMPUTATION      AORTA - BILATERAL FEMORAL ARTERY BYPASS GRAFT  2014    Left and right leg    CORONARY ANGIOPLASTY WITH STENT PLACEMENT  2000    Three placed in heart    FOOT AMPUTATION THROUGH METATARSAL  1996    left    FOOT SURGERY Bilateral 1980's    per patient multiple toe amputations prior to.  partial foot amputation:first great toe then other toes     KIDNEY SURGERY  2014    per patient separation of horseshoe kidney @ time of AAA repair    LUNG LOBECTOMY Right 1970s    per patient not cancer    right below knee amputation  2009 (approx)    SMALL INTESTINE SURGERY  2014    per patient partial @ time of aaa repair  not small bowel - large bowel bowel compromised bynghia AAAbowel    TONSILLECTOMY  1955 aprox     URETERAL STENT PLACEMENT Left     annually replaced since 2012 or so  Dr Jin       Review of patient's allergies indicates:   Allergen Reactions    Morphine Itching       No current facility-administered medications on file prior to encounter.      Current Outpatient Prescriptions on File Prior to Encounter   Medication Sig    amlodipine (NORVASC) 10 MG tablet TAKE 1 TABLET ONE TIME DAILY    carvedilol (COREG) 6.25 MG tablet Take 1 tablet (6.25 mg total) by mouth 2 (two) times daily.    clopidogrel (PLAVIX) 75 mg tablet TAKE 1 TABLET ONE TIME DAILY    docusate sodium (COLACE) 100 MG capsule Take 1 capsule (100 mg total) by mouth 2 (two) times daily.    losartan (COZAAR) 100 MG tablet Take 1 tablet (100 mg total) by mouth once daily.    omeprazole (PRILOSEC) 20 MG capsule Take 1 capsule (20 mg total) by mouth 2 (two) times daily.    oxycodone-acetaminophen (PERCOCET) 5-325 mg per tablet Take 1-2 tablets by mouth every 4 (four) hours as needed for Pain.    pravastatin (PRAVACHOL) 80 MG tablet Take 1 tablet (80 mg total) by mouth every evening.    triamcinolone acetonide 0.025% (KENALOG) 0.025 % cream Apply topically 2 (two) times daily.     Family History     Problem Relation (Age of Onset)    COPD Mother    Cancer Mother    Diabetes Daughter    Heart disease Father        Social History Main Topics    Smoking status: Former Smoker     Packs/day: 1.00     Years: 15.00     Quit date: 1/1/2009    Smokeless tobacco: Never Used    Alcohol use No    Drug use: No      Comment: Is on prescription opiod, no non prescribed use    Sexual activity: Not Currently     Partners: Female     Review of Systems   Constitutional: Negative.  Negative for appetite change, fatigue and fever.   HENT: Negative.  Negative for congestion, nosebleeds and sore throat.    Eyes: Negative.  Negative for photophobia, redness and visual disturbance.   Respiratory: Negative.  Negative for cough, shortness of breath and wheezing.     Cardiovascular: Negative.  Negative for chest pain, palpitations and leg swelling.   Gastrointestinal: Positive for abdominal pain (discomfort), nausea and vomiting. Negative for blood in stool, constipation and diarrhea.   Endocrine: Negative.  Negative for polydipsia, polyphagia and polyuria.   Genitourinary: Negative.  Negative for dysuria, flank pain, frequency and urgency.   Musculoskeletal: Negative.  Negative for arthralgias, back pain and joint swelling.   Skin: Negative.  Negative for color change, pallor and rash.   Allergic/Immunologic: Negative.  Negative for environmental allergies, food allergies and immunocompromised state.   Neurological: Negative.  Negative for dizziness, syncope, weakness, light-headedness, numbness and headaches.   Hematological: Negative.    Psychiatric/Behavioral: Negative.  Negative for confusion and hallucinations. The patient is not nervous/anxious.    All other systems reviewed and are negative.    Objective:     Vital Signs (Most Recent):  Temp: 98.3 °F (36.8 °C) (08/11/17 1435)  Pulse: 98 (08/11/17 1847)  Resp: 17 (08/11/17 1847)  BP: (!) 167/84 (08/11/17 1847)  SpO2: 96 % (08/11/17 1847) Vital Signs (24h Range):  Temp:  [98.3 °F (36.8 °C)] 98.3 °F (36.8 °C)  Pulse:  [] 98  Resp:  [16-17] 17  SpO2:  [96 %-98 %] 96 %  BP: (150-167)/(77-99) 167/84     Weight: 90.7 kg (200 lb)  Body mass index is 26.39 kg/m².    Physical Exam   Constitutional: He is oriented to person, place, and time. He appears well-developed and well-nourished. No distress.   Appears uncomfortable, NGT in place.   HENT:   Head: Normocephalic and atraumatic.   Eyes: Conjunctivae and EOM are normal. Pupils are equal, round, and reactive to light. No scleral icterus.   Neck: Normal range of motion. No thyromegaly present.   Cardiovascular: Normal rate, regular rhythm and normal heart sounds.    No murmur heard.  Pulmonary/Chest: Effort normal and breath sounds normal. No respiratory distress. He has  no wheezes. He exhibits no tenderness.   Abdominal: Soft. Bowel sounds are decreased. There is no tenderness.   Musculoskeletal: Normal range of motion. He exhibits no edema or tenderness.   Right BKA, left foot amputation   Lymphadenopathy:     He has no cervical adenopathy.   Neurological: He is alert and oriented to person, place, and time. No cranial nerve deficit. He exhibits normal muscle tone. Coordination normal.   Skin: Skin is warm and dry. He is not diaphoretic.   Psychiatric: He has a normal mood and affect. His behavior is normal. Judgment and thought content normal.   Nursing note and vitals reviewed.      Significant Labs:   BMP:   Recent Labs  Lab 08/11/17 1908   *      K 3.8      CO2 18*   BUN 25*   CREATININE 1.8*   CALCIUM 8.2*     CBC:   Recent Labs  Lab 08/11/17 1546 08/11/17 1908   WBC 9.99 7.92   HGB 14.7 13.0*   HCT 42.2 38.3*    128*     CMP:   Recent Labs  Lab 08/11/17 1546 08/11/17 1908    140   K 4.0 3.8    109   CO2 19* 18*   * 129*   BUN 29* 25*   CREATININE 2.4* 1.8*   CALCIUM 9.9 8.2*   PROT 8.9*  --    ALBUMIN 3.8  --    BILITOT 0.7  --    ALKPHOS 136*  --    AST 24  --    ALT 17  --    ANIONGAP 16 13   EGFRNONAA 27* 38*     Cardiac Markers: No results for input(s): CKMB, MYOGLOBIN, BNP, TROPISTAT in the last 48 hours.  Lactic Acid: No results for input(s): LACTATE in the last 48 hours.  Lipase:   Recent Labs  Lab 08/11/17 1546   LIPASE 7     Magnesium: No results for input(s): MG in the last 48 hours.  Troponin: No results for input(s): TROPONINI in the last 48 hours.  Urine Studies:   Recent Labs  Lab 08/11/17 1917   COLORU Yellow   APPEARANCEUA Clear   PHUR 6.0   SPECGRAV 1.025   PROTEINUA 2+*   GLUCUA Negative   KETONESU Negative   BILIRUBINUA Negative   OCCULTUA 2+*   NITRITE Negative   UROBILINOGEN Negative   LEUKOCYTESUR Negative   RBCUA 6*   WBCUA 0   BACTERIA None   SQUAMEPITHEL 2   HYALINECASTS 1     All pertinent labs  within the past 24 hours have been reviewed.    Significant Imaging: I have reviewed and interpreted all pertinent imaging results/findings within the past 24 hours.     Imaging Results          X-Ray Chest 1 View (In process)                CT Abdomen Pelvis  Without Contrast (Final result)  Result time 08/11/17 18:38:49   Procedure changed from CT Abdomen Pelvis With Contrast     Final result by Stefan Gagnon MD (08/11/17 18:38:49)                 Impression:     Mid small bowel obstruction, likely related to postoperative adhesions. See above. No free air or pneumatosis identified.      Electronically signed by: STEFAN GAGNNO MD  Date:     08/11/17  Time:    18:38              Narrative:    Examination: CT of the abdomen and pelvis without contrast.    History: Vomiting    Findings: Comparison is made to prior examination dated 03/20/2000 1534 she kidney again noted. Left ureteral stent remains in place. Postoperative changes again noted in the midline retroperitoneum.    Mid to proximal small bowel loops are dilated, with marked distention of the stomach as well. Contrast noted in the distal esophagus, likely related to reflux. Distal small bowel loops are decompressed, with transition point noted at the lower midline retroperitoneum, at the site of prior surgery. No free air or pneumatosis, and otherwise no significant change from prior exam.                             X-Ray Abdomen Flat And Erect (Final result)  Result time 08/11/17 15:53:16    Final result by Hill Gastelum MD (08/11/17 15:53:16)                 Impression:      Bowel gas pattern may be due to an ileus.  Small bowel obstruction not excluded.    Other findings as described above.      Electronically signed by: HILL GASTELUM MD  Date:     08/11/17  Time:    15:53              Narrative:    EXAM:  2 view abdomen, flat and erect    CLINICAL HISTORY: abdominal pain, unspecified without report of trauma    COMPARISON STUDIES: Abdominal  series 04/22/2016    FINDINGS:    Abdomen: No definite free air is seen. Several surgical clips within the midabdomen.  Left ureteral stent.  atherosclerosis.    There are several air-fluid levels within the abdomen.  There are dilated small bowel loops..                             X-Ray Chest PA And Lateral (Final result)  Result time 08/11/17 15:51:11    Final result by Hill Mario MD (08/11/17 15:51:11)                 Impression:      No acute cardiopulmonary findings.  Please see above      Electronically signed by: HILL MARIO MD  Date:     08/11/17  Time:    15:51              Narrative:    EXAM: Chest X-ray PA and Lateral    CLINICAL HISTORY:     Vomiting, unspecified    COMPARISON STUDIES:     04/24/2017    FINDINGS:    Surgical clips in the right axilla.  There are surgical staples in the right lung.  Chronic blunting of the right costophrenic sulcus.  Left lung appears clear.  Normal heart size.. Chronic right rib deformities probably from previous thoracotomy.  Coronary artery stent.  Chronic compression of one of the midthoracic vertebral bodies.  Surgical staples and a ureteral stent is noted on the lateral view..

## 2017-08-12 NOTE — ASSESSMENT & PLAN NOTE
Likely from adhesions. Feeling better since NG tube placement but still with distension, high pitched bowel sounds, tenderness, and no stool or flatus output. Await surgery evaluation. Defer management of SBO to surgery.

## 2017-08-12 NOTE — CONSULTS
[facility] -   General Surgery  Consult Note    Patient Name: Kodi Ribeiro  MRN: [id number]  Code Status: Full Code  Admission Date: 8/11/2017  Hospital Length of Stay: 1 days  Attending Physician: Ta Cook MD  Primary Care Provider: Norma Nuñez MD    Patient information was obtained from patient, spouse/SO and ER records.     Consults  Subjective:     Principal Problem: Small bowel obstruction due to adhesions    History of Present Illness: Admitted with known abdominal pain which started several days ago and worsened with persistent vomiting. He had previous bowel surgery in 2014, and small obstruction in the past. 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     No current facility-administered medications on file prior to encounter.      Current Outpatient Prescriptions on File Prior to Encounter   Medication Sig    amlodipine (NORVASC) 10 MG tablet TAKE 1 TABLET ONE TIME DAILY    carvedilol (COREG) 6.25 MG tablet Take 1 tablet (6.25 mg total) by mouth 2 (two) times daily.    clopidogrel (PLAVIX) 75 mg tablet TAKE 1 TABLET ONE TIME DAILY    docusate sodium (COLACE) 100 MG capsule Take 1 capsule (100 mg total) by mouth 2 (two) times daily.    losartan (COZAAR) 100 MG tablet Take 1 tablet (100 mg total) by mouth once daily.    omeprazole (PRILOSEC) 20 MG capsule Take 1 capsule (20 mg total) by mouth 2 (two) times daily.    oxycodone-acetaminophen (PERCOCET) 5-325 mg per tablet Take 1-2 tablets by mouth every 4 (four) hours as needed for Pain.    pravastatin (PRAVACHOL) 80 MG tablet Take 1 tablet (80 mg total) by mouth every evening.    triamcinolone acetonide 0.025% (KENALOG) 0.025 % cream Apply topically 2 (two) times daily.       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    Late complications of amputation stump     rseolved with further amputation( MRSA then none since 2014)    Lipoma of colon     colonoscopy 2/21/2014    Myocardial infarction     per patient 2000 & 9/2012    Peripheral vascular disease     Phantom limb syndrome     patient reports only intermittent not problematic, not worsening    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    Spinal cord disease     L4L5 disc    Tobacco dependence     resolved    Ureteral stent retained      Past Surgical History:   Procedure Laterality Date    ABDOMINAL AORTIC ANEURYSM REPAIR      ABDOMINAL AORTIC ANEURYSM REPAIR  1996/2014    AMPUTATION      AORTA - BILATERAL FEMORAL ARTERY BYPASS GRAFT  2014    Left and right leg    CORONARY ANGIOPLASTY WITH STENT PLACEMENT  2000    Three placed in heart    FOOT AMPUTATION THROUGH METATARSAL  1996    left    FOOT SURGERY Bilateral 1980's    per patient multiple toe amputations prior to.  partial foot amputation:first great toe then other toes     KIDNEY SURGERY  2014    per patient separation of horseshoe kidney @ time of AAA repair    LUNG LOBECTOMY Right 1970s    per patient not cancer    right below knee amputation  2009 (approx)    SMALL INTESTINE SURGERY  2014    per patient partial @ time of aaa repair  not small bowel - large bowel bowel compromised bynghia AAAbowel    TONSILLECTOMY  1955 aprox    URETERAL STENT PLACEMENT Left     annually replaced since 2012 or so  Dr Jin     Family History     Problem Relation (Age of Onset)    COPD Mother    Cancer Mother    Diabetes Daughter    Heart disease Father        Social History Main Topics    Smoking status: Former Smoker     Packs/day: 1.00     Years: 15.00      Quit date: 1/1/2009    Smokeless tobacco: Never Used    Alcohol use No    Drug use: No      Comment: Is on prescription opiod, no non prescribed use    Sexual activity: Not Currently     Partners: Female     Review of Systems   Constitutional: Positive for activity change and appetite change. Negative for chills and fever.   HENT: Negative for sore throat and trouble swallowing.    Eyes: Negative.    Respiratory: Negative for cough and shortness of breath.    Cardiovascular: Negative.    Gastrointestinal: Positive for abdominal distention, abdominal pain, nausea and vomiting. Negative for anal bleeding and blood in stool.   Endocrine: Negative.    Genitourinary: Negative.    Musculoskeletal: Negative.    Skin: Negative.    Allergic/Immunologic: Negative.    Neurological: Negative.    Hematological: Does not bruise/bleed easily.   Psychiatric/Behavioral: Negative.      Objective:     Vital Signs (Most Recent):  Temp: 97.4 °F (36.3 °C) (08/12/17 0744)  Pulse: 88 (08/12/17 0744)  Resp: 16 (08/12/17 0744)  BP: (!) 162/77 (08/12/17 0744)  SpO2: (!) 94 % (08/12/17 0744) Vital Signs (24h Range):  Temp:  [97.4 °F (36.3 °C)-98.8 °F (37.1 °C)] 97.4 °F (36.3 °C)  Pulse:  [] 88  Resp:  [15-17] 16  SpO2:  [93 %-98 %] 94 %  BP: (150-167)/(70-99) 162/77     Weight: 90.7 kg (200 lb)  Body mass index is 26.39 kg/m².    Physical Exam   Constitutional: He is oriented to person, place, and time. He appears well-developed and well-nourished.   HENT:   Head: Normocephalic.   Right Ear: External ear normal.   Left Ear: External ear normal.   Nose: Nose normal.   Eyes: Pupils are equal, round, and reactive to light. No scleral icterus.   Neck: Normal range of motion. Neck supple. No thyromegaly present.   Cardiovascular: Normal rate, regular rhythm and normal heart sounds.    No murmur heard.  Pulmonary/Chest: Effort normal and breath sounds normal.   Abdominal: Soft. He exhibits distension. He exhibits no mass. There is  tenderness. There is no rebound. No hernia.   Midline incision.   Musculoskeletal: Normal range of motion. He exhibits deformity.   S/p BKA of right    Lymphadenopathy:     He has no cervical adenopathy.   Neurological: He is alert and oriented to person, place, and time.   Skin: Skin is warm and dry.       Significant Labs:  CBC:   Recent Labs  Lab 08/12/17 0527   WBC 4.03   RBC 3.99*   HGB 11.9*   HCT 35.8*   *   MCV 90   MCH 29.8   MCHC 33.2     CMP:   Recent Labs  Lab 08/12/17  0526   *   CALCIUM 8.5*   ALBUMIN 3.1*   PROT 7.0      K 3.7   CO2 19*   *   BUN 24*   CREATININE 1.6*   ALKPHOS 98   ALT 13   AST 27   BILITOT 0.5       Significant Diagnostics:  I have reviewed and interpreted all pertinent imaging results/findings within the past 24 hours.    Assessment/Plan:     * Small bowel obstruction due to adhesions    Conservative measure with NG decompression. If not resolve, exploration on Monday.          VTE Risk Mitigation         Ordered     Medium Risk of VTE  Once      08/11/17 2210     Place sequential compression device  Until discontinued      08/11/17 2210     Reason for No Pharmacological VTE Prophylaxis  Once      08/11/17 2210     Place sequential compression device  Until discontinued      08/11/17 2210          Thank you for your consult. I will follow-up with patient. Please contact us if you have any additional questions.    Ta Cook MD  General Surgery  [facility] -

## 2017-08-12 NOTE — HPI
Admitted with known abdominal pain which started several days ago and worsened with persistent vomiting. He had previous bowel surgery in 2014, and small obstruction in the past. 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

## 2017-08-12 NOTE — PLAN OF CARE
Problem: Patient Care Overview  Goal: Plan of Care Review  Outcome: Ongoing (interventions implemented as appropriate)  Pain adequately controlled with prn medication. Pt able to reposition independently in bed. NG in place to lwc suction as ordered with small amount of brown colored output. Denies any nausea. NSR on the monitor. Chart reviewed. Will monitor.

## 2017-08-12 NOTE — CONSULTS
"[facility] Hospital Medicine  Consult Note    Patient Name: Kodi Ribeiro  MRN: [id number]  Admission Date: 8/11/2017  Hospital Length of Stay: 0 days  Attending Physician: Sindi Samson MD   Primary Care Provider: Norma Nuñez MD           Patient information was obtained from patient, spouse/SO, relative(s) and ER records.     Consults  Subjective:     Principal Problem: Small bowel obstruction due to adhesions    Chief Complaint:   Chief Complaint   Patient presents with    Abdominal Pain     abd pain, nausea and vomiting since wednesday.         HPI: Mr. Ribeiro is a 66 y/o  male with h/o CAD, PAD, cardiac stents, CKD3, HTN, right BKA, AAA repair complicated by SBO, requiring surgery, left ureteral stent placement, presents to the ED c/o abdominal discomfort, nausea and vomiting since yesterday morning 1 AM. He thought it was "stomach bug" and would pass, but continued to get worse, hence presented to the ED. Last BM 3 days ago (wednesday).    X-Ray abdomen shows "several air-fluid levels within the abdomen.  There are dilated small bowel loops."    CT abdomen reveals "Mid to proximal small bowel loops are dilated, with marked distention of the stomach as well. Contrast noted in the distal esophagus, likely related to reflux. Distal small bowel loops are decompressed, with transition point noted at the lower midline retroperitoneum, at the site of prior surgery".     was contacted who will admit the patient. [facility] was consulted for medical management. Patient just had NG tube placed.    In the ED today, patient had coffee-ground emesis in the ED.  was consulted who recommend supportive care at this time.      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    Jejunal polyp     VCE 3/7/2014    Late complications of amputation stump     rseolved with further amputation( MRSA then none since 2014)    Lipoma of colon     colonoscopy 2/21/2014    Myocardial infarction     per patient 2000 & 9/2012    Peripheral vascular disease     Phantom limb syndrome     patient reports only intermittent not problematic, not worsening    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    Spinal cord disease     L4L5 disc    Tobacco dependence     resolved    Ulcer of ileum     VCE 3/7/2014    Ureteral stent retained        Past Surgical History:   Procedure Laterality Date    ABDOMINAL AORTIC ANEURYSM REPAIR      ABDOMINAL AORTIC ANEURYSM REPAIR  1996/2014    AMPUTATION      AORTA - BILATERAL FEMORAL ARTERY BYPASS GRAFT  2014    Left and right leg    CORONARY ANGIOPLASTY WITH STENT PLACEMENT  2000    Three placed in heart    FOOT AMPUTATION THROUGH METATARSAL  1996    left    FOOT SURGERY Bilateral 1980's    per patient multiple toe amputations prior to.  partial foot amputation:first great toe then other toes     KIDNEY SURGERY  2014    per patient separation of horseshoe kidney @ time of AAA repair    LUNG LOBECTOMY Right 1970s    per patient not cancer    right below knee amputation  2009 (approx)    SMALL INTESTINE SURGERY  2014    per patient partial @ time of aaa repair  not small bowel - large bowel bowel compromised bythtwe AAAbowel    TONSILLECTOMY  1955 aprox    URETERAL STENT PLACEMENT Left     annually replaced since 2012 or so  Dr Jin       Review of patient's allergies indicates:   Allergen Reactions    Morphine Itching       No current facility-administered medications on file prior to encounter.       Current Outpatient Prescriptions on File Prior to Encounter   Medication Sig    amlodipine (NORVASC) 10 MG tablet TAKE 1 TABLET ONE TIME DAILY    carvedilol (COREG) 6.25 MG tablet Take 1 tablet (6.25 mg total) by mouth 2 (two) times daily.    clopidogrel (PLAVIX) 75 mg tablet TAKE 1 TABLET ONE TIME DAILY    docusate sodium (COLACE) 100 MG capsule Take 1 capsule (100 mg total) by mouth 2 (two) times daily.    losartan (COZAAR) 100 MG tablet Take 1 tablet (100 mg total) by mouth once daily.    omeprazole (PRILOSEC) 20 MG capsule Take 1 capsule (20 mg total) by mouth 2 (two) times daily.    oxycodone-acetaminophen (PERCOCET) 5-325 mg per tablet Take 1-2 tablets by mouth every 4 (four) hours as needed for Pain.    pravastatin (PRAVACHOL) 80 MG tablet Take 1 tablet (80 mg total) by mouth every evening.    triamcinolone acetonide 0.025% (KENALOG) 0.025 % cream Apply topically 2 (two) times daily.     Family History     Problem Relation (Age of Onset)    COPD Mother    Cancer Mother    Diabetes Daughter    Heart disease Father        Social History Main Topics    Smoking status: Former Smoker     Packs/day: 1.00     Years: 15.00     Quit date: 1/1/2009    Smokeless tobacco: Never Used    Alcohol use No    Drug use: No      Comment: Is on prescription opiod, no non prescribed use    Sexual activity: Not Currently     Partners: Female     Review of Systems   Constitutional: Negative.  Negative for appetite change, fatigue and fever.   HENT: Negative.  Negative for congestion, nosebleeds and sore throat.    Eyes: Negative.  Negative for photophobia, redness and visual disturbance.   Respiratory: Negative.  Negative for cough, shortness of breath and wheezing.    Cardiovascular: Negative.  Negative for chest pain, palpitations and leg swelling.   Gastrointestinal: Positive for abdominal pain (discomfort), nausea and vomiting. Negative for blood in stool, constipation and diarrhea.   Endocrine: Negative.   Negative for polydipsia, polyphagia and polyuria.   Genitourinary: Negative.  Negative for dysuria, flank pain, frequency and urgency.   Musculoskeletal: Negative.  Negative for arthralgias, back pain and joint swelling.   Skin: Negative.  Negative for color change, pallor and rash.   Allergic/Immunologic: Negative.  Negative for environmental allergies, food allergies and immunocompromised state.   Neurological: Negative.  Negative for dizziness, syncope, weakness, light-headedness, numbness and headaches.   Hematological: Negative.    Psychiatric/Behavioral: Negative.  Negative for confusion and hallucinations. The patient is not nervous/anxious.    All other systems reviewed and are negative.    Objective:     Vital Signs (Most Recent):  Temp: 98.3 °F (36.8 °C) (08/11/17 1435)  Pulse: 98 (08/11/17 1847)  Resp: 17 (08/11/17 1847)  BP: (!) 167/84 (08/11/17 1847)  SpO2: 96 % (08/11/17 1847) Vital Signs (24h Range):  Temp:  [98.3 °F (36.8 °C)] 98.3 °F (36.8 °C)  Pulse:  [] 98  Resp:  [16-17] 17  SpO2:  [96 %-98 %] 96 %  BP: (150-167)/(77-99) 167/84     Weight: 90.7 kg (200 lb)  Body mass index is 26.39 kg/m².    Physical Exam   Constitutional: He is oriented to person, place, and time. He appears well-developed and well-nourished. No distress.   Appears uncomfortable, NGT in place.   HENT:   Head: Normocephalic and atraumatic.   Eyes: Conjunctivae and EOM are normal. Pupils are equal, round, and reactive to light. No scleral icterus.   Neck: Normal range of motion. No thyromegaly present.   Cardiovascular: Normal rate, regular rhythm and normal heart sounds.    No murmur heard.  Pulmonary/Chest: Effort normal and breath sounds normal. No respiratory distress. He has no wheezes. He exhibits no tenderness.   Abdominal: Soft. Bowel sounds are decreased. There is no tenderness.   Musculoskeletal: Normal range of motion. He exhibits no edema or tenderness.   Right BKA, left foot amputation   Lymphadenopathy:     He  has no cervical adenopathy.   Neurological: He is alert and oriented to person, place, and time. No cranial nerve deficit. He exhibits normal muscle tone. Coordination normal.   Skin: Skin is warm and dry. He is not diaphoretic.   Psychiatric: He has a normal mood and affect. His behavior is normal. Judgment and thought content normal.   Nursing note and vitals reviewed.      Significant Labs:   BMP:   Recent Labs  Lab 08/11/17 1908   *      K 3.8      CO2 18*   BUN 25*   CREATININE 1.8*   CALCIUM 8.2*     CBC:   Recent Labs  Lab 08/11/17 1546 08/11/17 1908   WBC 9.99 7.92   HGB 14.7 13.0*   HCT 42.2 38.3*    128*     CMP:   Recent Labs  Lab 08/11/17 1546 08/11/17 1908    140   K 4.0 3.8    109   CO2 19* 18*   * 129*   BUN 29* 25*   CREATININE 2.4* 1.8*   CALCIUM 9.9 8.2*   PROT 8.9*  --    ALBUMIN 3.8  --    BILITOT 0.7  --    ALKPHOS 136*  --    AST 24  --    ALT 17  --    ANIONGAP 16 13   EGFRNONAA 27* 38*     Cardiac Markers: No results for input(s): CKMB, MYOGLOBIN, BNP, TROPISTAT in the last 48 hours.  Lactic Acid: No results for input(s): LACTATE in the last 48 hours.  Lipase:   Recent Labs  Lab 08/11/17 1546   LIPASE 7     Magnesium: No results for input(s): MG in the last 48 hours.  Troponin: No results for input(s): TROPONINI in the last 48 hours.  Urine Studies:   Recent Labs  Lab 08/11/17 1917   COLORU Yellow   APPEARANCEUA Clear   PHUR 6.0   SPECGRAV 1.025   PROTEINUA 2+*   GLUCUA Negative   KETONESU Negative   BILIRUBINUA Negative   OCCULTUA 2+*   NITRITE Negative   UROBILINOGEN Negative   LEUKOCYTESUR Negative   RBCUA 6*   WBCUA 0   BACTERIA None   SQUAMEPITHEL 2   HYALINECASTS 1     All pertinent labs within the past 24 hours have been reviewed.    Significant Imaging: I have reviewed and interpreted all pertinent imaging results/findings within the past 24 hours.     Imaging Results          X-Ray Chest 1 View (In process)                CT Abdomen  Pelvis  Without Contrast (Final result)  Result time 08/11/17 18:38:49   Procedure changed from CT Abdomen Pelvis With Contrast     Final result by Stefan Gagnon MD (08/11/17 18:38:49)                 Impression:     Mid small bowel obstruction, likely related to postoperative adhesions. See above. No free air or pneumatosis identified.      Electronically signed by: STEFAN GAGNON MD  Date:     08/11/17  Time:    18:38              Narrative:    Examination: CT of the abdomen and pelvis without contrast.    History: Vomiting    Findings: Comparison is made to prior examination dated 03/20/2000 1534 she kidney again noted. Left ureteral stent remains in place. Postoperative changes again noted in the midline retroperitoneum.    Mid to proximal small bowel loops are dilated, with marked distention of the stomach as well. Contrast noted in the distal esophagus, likely related to reflux. Distal small bowel loops are decompressed, with transition point noted at the lower midline retroperitoneum, at the site of prior surgery. No free air or pneumatosis, and otherwise no significant change from prior exam.                             X-Ray Abdomen Flat And Erect (Final result)  Result time 08/11/17 15:53:16    Final result by Hill Gastelum MD (08/11/17 15:53:16)                 Impression:      Bowel gas pattern may be due to an ileus.  Small bowel obstruction not excluded.    Other findings as described above.      Electronically signed by: HILL GASTELUM MD  Date:     08/11/17  Time:    15:53              Narrative:    EXAM:  2 view abdomen, flat and erect    CLINICAL HISTORY: abdominal pain, unspecified without report of trauma    COMPARISON STUDIES: Abdominal series 04/22/2016    FINDINGS:    Abdomen: No definite free air is seen. Several surgical clips within the midabdomen.  Left ureteral stent.  atherosclerosis.    There are several air-fluid levels within the abdomen.  There are dilated small bowel loops..                              X-Ray Chest PA And Lateral (Final result)  Result time 08/11/17 15:51:11    Final result by Hill Mario MD (08/11/17 15:51:11)                 Impression:      No acute cardiopulmonary findings.  Please see above      Electronically signed by: HILL MARIO MD  Date:     08/11/17  Time:    15:51              Narrative:    EXAM: Chest X-ray PA and Lateral    CLINICAL HISTORY:     Vomiting, unspecified    COMPARISON STUDIES:     04/24/2017    FINDINGS:    Surgical clips in the right axilla.  There are surgical staples in the right lung.  Chronic blunting of the right costophrenic sulcus.  Left lung appears clear.  Normal heart size.. Chronic right rib deformities probably from previous thoracotomy.  Coronary artery stent.  Chronic compression of one of the midthoracic vertebral bodies.  Surgical staples and a ureteral stent is noted on the lateral view..                                Assessment/Plan:     * Small bowel obstruction due to adhesions    - NGT in place.  - Supportive care  - IV fluids          CAD;Old MI ; s/p stents x 3 (2000)     - Hold anti-platelet agents  - Denies chest pain or SOB at this time.          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    - H/o Right BKA          CKD (chronic kidney disease) stage 3, GFR 30-59 ml/min    - Serum creatinine at baseline 1.8.  - Gentle IV hydration  - Monitor, labs in AM          Essential hypertension    - Hydralazine IV prn  - Hold oral BP medications while NGT in place  - Monitor and adjust as needed          Coffee ground emesis    - Likely due to persistent vomiting.  - GI consulted, Dr. Tanner aware  - Protonix IV daily for now.            VTE Risk Mitigation     None              Thank you for your consult. I will follow-up with patient. Please contact us if you have any additional questions.    Mckinley Alvarado MD  Department of Hospital Medicine   [facility]

## 2017-08-12 NOTE — ASSESSMENT & PLAN NOTE
- Hydralazine IV prn  - Hold oral BP medications while NGT in place  - Monitor and adjust as needed

## 2017-08-12 NOTE — ASSESSMENT & PLAN NOTE
- Likely due to persistent vomiting.  - GI consulted, Dr. Tanner aware  - Protonix IV daily for now.

## 2017-08-12 NOTE — CONSULTS
[facility]   Gastroenterology  Consult Note    Patient Name: Kodi Ribeiro  MRN: [id number]  Admission Date: 8/11/2017  Hospital Length of Stay: 1 days  Code Status: Full Code   Attending Provider: Ta Cook MD   Consulting Provider: Isaac Tanner MD  Primary Care Physician: Norma Nuñez MD  Principal Problem:Small bowel obstruction due to adhesions    Inpatient consult to Gastroenterology  Consult performed by: ISAAC TANNER  Consult ordered by: DORIS COX  Reason for consult: Coffee ground emesis        Subjective:     HPI:  Mr. Ross is well known to me although I have not seen him in about 2 years. He has a fairly complicated past surgical history but had been doing quite well until Wednesday night when he developed fairly sudden onset generalized abdominal pain followed by severe, persistent vomiting. He reports that initially the emesis was clear and yellow, but later became black in color. There was no blood in the emesis initially. The pain and vomiting persisted so he came to the ED yesterday. While in the ED he was noted to have some coffee ground emesis. There has never been any red blood in the emesis. He denies any melena or hematochezia prior to these events. He has not had a bowel movement since Wednesday and is not passing gas. He has an NG tube in place and is feeling better since that has been suctioning gastric contents. No F/C/CP/SOB.     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    Late complications of amputation stump     rseolved with further amputation( MRSA then none since 2014)    Lipoma of  colon     colonoscopy 2/21/2014    Myocardial infarction     per patient 2000 & 9/2012    Peripheral vascular disease     Phantom limb syndrome     patient reports only intermittent not problematic, not worsening    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    Spinal cord disease     L4L5 disc    Tobacco dependence     resolved    Ureteral stent retained        Past Surgical History:   Procedure Laterality Date    ABDOMINAL AORTIC ANEURYSM REPAIR      ABDOMINAL AORTIC ANEURYSM REPAIR  1996/2014    AMPUTATION      AORTA - BILATERAL FEMORAL ARTERY BYPASS GRAFT  2014    Left and right leg    CORONARY ANGIOPLASTY WITH STENT PLACEMENT  2000    Three placed in heart    FOOT AMPUTATION THROUGH METATARSAL  1996    left    FOOT SURGERY Bilateral 1980's    per patient multiple toe amputations prior to.  partial foot amputation:first great toe then other toes     KIDNEY SURGERY  2014    per patient separation of horseshoe kidney @ time of AAA repair    LUNG LOBECTOMY Right 1970s    per patient not cancer    right below knee amputation  2009 (approx)    SMALL INTESTINE SURGERY  2014    per patient partial @ time of aaa repair  not small bowel - large bowel bowel compromised bythtwe AAAbowel    TONSILLECTOMY  1955 aprox    URETERAL STENT PLACEMENT Left     annually replaced since 2012 or so  Dr Jin       Review of patient's allergies indicates:   Allergen Reactions    Morphine Itching     Family History     Problem Relation (Age of Onset)    COPD Mother    Cancer Mother    Diabetes Daughter    Heart disease Father        Social History Main Topics    Smoking status: Former Smoker     Packs/day: 1.00     Years: 15.00     Quit date: 1/1/2009    Smokeless tobacco: Never Used    Alcohol use No    Drug use: No      Comment: Is on prescription opiod, no non prescribed use    Sexual activity: Not Currently     Partners: Female     Review of Systems   Constitutional: Negative for appetite change,  chills, fatigue, fever and unexpected weight change.   HENT: Negative for ear pain, sore throat, trouble swallowing and voice change.    Eyes: Negative for pain, redness and visual disturbance.   Respiratory: Negative for cough, choking, chest tightness, shortness of breath and wheezing.    Cardiovascular: Negative for chest pain, palpitations and leg swelling.   Gastrointestinal: Positive for abdominal distention, abdominal pain, nausea and vomiting. Negative for blood in stool, constipation and diarrhea.   Endocrine: Negative for cold intolerance, heat intolerance, polydipsia, polyphagia and polyuria.   Genitourinary: Negative for difficulty urinating, dysuria, flank pain and hematuria.   Musculoskeletal: Negative for arthralgias, back pain and gait problem.   Skin: Negative for color change, pallor and rash.   Neurological: Negative for dizziness, light-headedness and headaches.   Hematological: Negative for adenopathy. Does not bruise/bleed easily.   Psychiatric/Behavioral: Negative for agitation, behavioral problems and confusion.     Objective:     Vital Signs (Most Recent):  Temp: 97.4 °F (36.3 °C) (08/12/17 0744)  Pulse: 88 (08/12/17 0744)  Resp: 16 (08/12/17 0744)  BP: (!) 162/77 (08/12/17 0744)  SpO2: (!) 94 % (08/12/17 0744) Vital Signs (24h Range):  Temp:  [97.4 °F (36.3 °C)-98.8 °F (37.1 °C)] 97.4 °F (36.3 °C)  Pulse:  [] 88  Resp:  [15-17] 16  SpO2:  [93 %-98 %] 94 %  BP: (150-167)/(70-99) 162/77     Weight: 90.7 kg (200 lb) (08/11/17 1435)  Body mass index is 26.39 kg/m².      Intake/Output Summary (Last 24 hours) at 08/12/17 0907  Last data filed at 08/12/17 0812   Gross per 24 hour   Intake              750 ml   Output              250 ml   Net              500 ml       Lines/Drains/Airways     Drain                 NG/OG Tube 08/11/17 1935 [place] sump 16 Fr. Right nostril less than 1 day          Peripheral Intravenous Line                 Peripheral IV - Single Lumen 08/11/17 Left Forearm 1  day                Physical Exam   Constitutional: He is oriented to person, place, and time. He appears well-developed and well-nourished. He appears distressed.   Uncomfortable appearing   HENT:   Head: Normocephalic and atraumatic.   Mouth/Throat: Oropharynx is clear and moist.   Eyes: Conjunctivae are normal. Pupils are equal, round, and reactive to light. No scleral icterus.   Neck: No thyromegaly present.   Cardiovascular: Normal rate, regular rhythm and normal heart sounds.    Pulmonary/Chest: Effort normal and breath sounds normal. He has no wheezes. He has no rales.   Abdominal: Soft. He exhibits distension. There is tenderness. There is no rebound and no guarding.   Bowel sounds are high pitched and tinkling; NG tube with bilious output and some coffee ground appearing material; Abdomen is tender diffusely.   Musculoskeletal: He exhibits no edema or tenderness.   Right BKA noted   Lymphadenopathy:     He has no cervical adenopathy.   Neurological: He is alert and oriented to person, place, and time.   Skin: No rash noted. No erythema. No pallor.   Psychiatric: He has a normal mood and affect. His behavior is normal.   Nursing note and vitals reviewed.      Significant Labs:  CBC:     Recent Labs  Lab 08/11/17  1546 08/11/17  1908 08/12/17  0527   WBC 9.99 7.92 4.03   HGB 14.7 13.0* 11.9*   HCT 42.2 38.3* 35.8*    128* 134*     CMP:     Recent Labs  Lab 08/12/17  0526   *   CALCIUM 8.5*   ALBUMIN 3.1*   PROT 7.0      K 3.7   CO2 19*   *   BUN 24*   CREATININE 1.6*   ALKPHOS 98   ALT 13   AST 27   BILITOT 0.5     Coagulation:     Recent Labs  Lab 08/11/17  1552   INR 1.1   APTT 32.3*       Significant Imaging:  CT: I have reviewed all results within the past 24 hours and my personal findings are:  Evidence of SBO     Scheduled Meds:   pantoprazole  40 mg Intravenous BID     Continuous Infusions:   sodium chloride 0.9% 100 mL/hr at 08/11/17 2232     PRN Meds:.acetaminophen,  guaifenesin 100 mg/5 ml, hydrALAZINE, ondansetron, promethazine (PHENERGAN) IVPB      Assessment/Plan:     Coffee ground emesis    Minimal coffee grounds in mostly bilious NG tube output. No red blood output. Coffee grounds did not start until after >24h of vomiting in the setting of Plavix use. Slight decline in hgb. At this point would continue IV PPI q12h and monitor hgb q8h. No signs of significant bleeding and no evidence of hemodynamic impact. No plans for EGD at this time as benefits are outweighed by risk of endoscopy in pt with SBO.         * Small bowel obstruction due to adhesions    Likely from adhesions. Feeling better since NG tube placement but still with distension, high pitched bowel sounds, tenderness, and no stool or flatus output. Await surgery evaluation. Defer management of SBO to surgery.            Thank you for your consult. I will follow-up with patient. Please contact us if you have any additional questions.    Isaac Tanner MD  Gastroenterology  [facility]

## 2017-08-12 NOTE — SUBJECTIVE & OBJECTIVE
No current facility-administered medications on file prior to encounter.      Current Outpatient Prescriptions on File Prior to Encounter   Medication Sig    amlodipine (NORVASC) 10 MG tablet TAKE 1 TABLET ONE TIME DAILY    carvedilol (COREG) 6.25 MG tablet Take 1 tablet (6.25 mg total) by mouth 2 (two) times daily.    clopidogrel (PLAVIX) 75 mg tablet TAKE 1 TABLET ONE TIME DAILY    docusate sodium (COLACE) 100 MG capsule Take 1 capsule (100 mg total) by mouth 2 (two) times daily.    losartan (COZAAR) 100 MG tablet Take 1 tablet (100 mg total) by mouth once daily.    omeprazole (PRILOSEC) 20 MG capsule Take 1 capsule (20 mg total) by mouth 2 (two) times daily.    oxycodone-acetaminophen (PERCOCET) 5-325 mg per tablet Take 1-2 tablets by mouth every 4 (four) hours as needed for Pain.    pravastatin (PRAVACHOL) 80 MG tablet Take 1 tablet (80 mg total) by mouth every evening.    triamcinolone acetonide 0.025% (KENALOG) 0.025 % cream Apply topically 2 (two) times daily.       Review of patient's allergies indicates:   Allergen Reactions    Morphine Itching       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    Late complications of amputation stump     rseolved with further amputation( MRSA then none since 2014)    Lipoma of colon     colonoscopy 2/21/2014    Myocardial infarction     per patient 2000 & 9/2012    Peripheral vascular disease     Phantom limb syndrome     patient reports only intermittent not problematic, not worsening    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    Spinal cord disease     L4L5 disc    Tobacco dependence      resolved    Ureteral stent retained      Past Surgical History:   Procedure Laterality Date    ABDOMINAL AORTIC ANEURYSM REPAIR      ABDOMINAL AORTIC ANEURYSM REPAIR  1996/2014    AMPUTATION      AORTA - BILATERAL FEMORAL ARTERY BYPASS GRAFT  2014    Left and right leg    CORONARY ANGIOPLASTY WITH STENT PLACEMENT  2000    Three placed in heart    FOOT AMPUTATION THROUGH METATARSAL  1996    left    FOOT SURGERY Bilateral 1980's    per patient multiple toe amputations prior to.  partial foot amputation:first great toe then other toes     KIDNEY SURGERY  2014    per patient separation of horseshoe kidney @ time of AAA repair    LUNG LOBECTOMY Right 1970s    per patient not cancer    right below knee amputation  2009 (approx)    SMALL INTESTINE SURGERY  2014    per patient partial @ time of aaa repair  not small bowel - large bowel bowel compromised bythtwe AAAbowel    TONSILLECTOMY  1955 aprox    URETERAL STENT PLACEMENT Left     annually replaced since 2012 or so  Dr Jin     Family History     Problem Relation (Age of Onset)    COPD Mother    Cancer Mother    Diabetes Daughter    Heart disease Father        Social History Main Topics    Smoking status: Former Smoker     Packs/day: 1.00     Years: 15.00     Quit date: 1/1/2009    Smokeless tobacco: Never Used    Alcohol use No    Drug use: No      Comment: Is on prescription opiod, no non prescribed use    Sexual activity: Not Currently     Partners: Female     Review of Systems   Constitutional: Positive for activity change and appetite change. Negative for chills and fever.   HENT: Negative for sore throat and trouble swallowing.    Eyes: Negative.    Respiratory: Negative for cough and shortness of breath.    Cardiovascular: Negative.    Gastrointestinal: Positive for abdominal distention, abdominal pain, nausea and vomiting. Negative for anal bleeding and blood in stool.   Endocrine: Negative.    Genitourinary: Negative.    Musculoskeletal:  Negative.    Skin: Negative.    Allergic/Immunologic: Negative.    Neurological: Negative.    Hematological: Does not bruise/bleed easily.   Psychiatric/Behavioral: Negative.      Objective:     Vital Signs (Most Recent):  Temp: 97.4 °F (36.3 °C) (08/12/17 0744)  Pulse: 88 (08/12/17 0744)  Resp: 16 (08/12/17 0744)  BP: (!) 162/77 (08/12/17 0744)  SpO2: (!) 94 % (08/12/17 0744) Vital Signs (24h Range):  Temp:  [97.4 °F (36.3 °C)-98.8 °F (37.1 °C)] 97.4 °F (36.3 °C)  Pulse:  [] 88  Resp:  [15-17] 16  SpO2:  [93 %-98 %] 94 %  BP: (150-167)/(70-99) 162/77     Weight: 90.7 kg (200 lb)  Body mass index is 26.39 kg/m².    Physical Exam   Constitutional: He is oriented to person, place, and time. He appears well-developed and well-nourished.   HENT:   Head: Normocephalic.   Right Ear: External ear normal.   Left Ear: External ear normal.   Nose: Nose normal.   Eyes: Pupils are equal, round, and reactive to light. No scleral icterus.   Neck: Normal range of motion. Neck supple. No thyromegaly present.   Cardiovascular: Normal rate, regular rhythm and normal heart sounds.    No murmur heard.  Pulmonary/Chest: Effort normal and breath sounds normal.   Abdominal: Soft. He exhibits distension. He exhibits no mass. There is tenderness. There is no rebound. No hernia.   Midline incision.   Musculoskeletal: Normal range of motion. He exhibits deformity.   S/p BKA of right    Lymphadenopathy:     He has no cervical adenopathy.   Neurological: He is alert and oriented to person, place, and time.   Skin: Skin is warm and dry.       Significant Labs:  CBC:   Recent Labs  Lab 08/12/17 0527   WBC 4.03   RBC 3.99*   HGB 11.9*   HCT 35.8*   *   MCV 90   MCH 29.8   MCHC 33.2     CMP:   Recent Labs  Lab 08/12/17  0526   *   CALCIUM 8.5*   ALBUMIN 3.1*   PROT 7.0      K 3.7   CO2 19*   *   BUN 24*   CREATININE 1.6*   ALKPHOS 98   ALT 13   AST 27   BILITOT 0.5       Significant Diagnostics:  I have reviewed and  interpreted all pertinent imaging results/findings within the past 24 hours.

## 2017-08-12 NOTE — SUBJECTIVE & OBJECTIVE
Interval History: No acute issues overnight. Pt reports pain is improved. Continue NG decompression.     Review of Systems   Constitutional: Negative.  Negative for activity change, appetite change, chills, fatigue, fever and unexpected weight change.   HENT: Negative.  Negative for congestion, facial swelling, nosebleeds, rhinorrhea, sinus pressure, sneezing and sore throat.    Eyes: Negative.  Negative for photophobia, discharge, redness and visual disturbance.   Respiratory: Negative.  Negative for apnea, cough, chest tightness, shortness of breath, wheezing and stridor.    Cardiovascular: Negative.  Negative for chest pain, palpitations and leg swelling.   Gastrointestinal: Positive for abdominal pain (discomfort), nausea and vomiting. Negative for abdominal distention, anal bleeding, blood in stool, constipation and diarrhea.   Endocrine: Negative.  Negative for polydipsia, polyphagia and polyuria.   Genitourinary: Negative.  Negative for difficulty urinating, discharge, dysuria, flank pain, frequency, hematuria and urgency.   Musculoskeletal: Negative.  Negative for arthralgias, back pain, gait problem, joint swelling, myalgias, neck pain and neck stiffness.   Skin: Negative.  Negative for color change, pallor and rash.   Allergic/Immunologic: Negative.  Negative for environmental allergies, food allergies and immunocompromised state.   Neurological: Negative.  Negative for dizziness, tremors, seizures, syncope, facial asymmetry, speech difficulty, weakness, light-headedness, numbness and headaches.   Hematological: Negative.    Psychiatric/Behavioral: Negative.  Negative for behavioral problems, confusion, hallucinations and suicidal ideas. The patient is not nervous/anxious.    All other systems reviewed and are negative.    Objective:     Vital Signs (Most Recent):  Temp: 97.6 °F (36.4 °C) (08/12/17 1242)  Pulse: 82 (08/12/17 1242)  Resp: 18 (08/12/17 1242)  BP: (!) 152/74 (08/12/17 1242)  SpO2: 96 %  (08/12/17 1242) Vital Signs (24h Range):  Temp:  [97.4 °F (36.3 °C)-98.8 °F (37.1 °C)] 97.6 °F (36.4 °C)  Pulse:  [] 82  Resp:  [15-18] 18  SpO2:  [93 %-98 %] 96 %  BP: (150-167)/(70-99) 152/74     Weight: 90.7 kg (200 lb)  Body mass index is 26.39 kg/m².    Intake/Output Summary (Last 24 hours) at 08/12/17 1344  Last data filed at 08/12/17 1242   Gross per 24 hour   Intake              750 ml   Output              950 ml   Net             -200 ml      Physical Exam   Constitutional: He is oriented to person, place, and time. He appears well-developed and well-nourished. No distress.   Appears uncomfortable, NGT in place.   HENT:   Head: Normocephalic and atraumatic.   Eyes: Conjunctivae and EOM are normal. Pupils are equal, round, and reactive to light. No scleral icterus.   Neck: Normal range of motion. Neck supple. No thyromegaly present.   Cardiovascular: Normal rate, regular rhythm, normal heart sounds and intact distal pulses.    No murmur heard.  Pulmonary/Chest: Effort normal and breath sounds normal. No respiratory distress. He has no wheezes. He exhibits no tenderness.   Abdominal: Soft. He exhibits no distension. Bowel sounds are decreased. There is no tenderness.   Musculoskeletal: Normal range of motion. He exhibits no edema, tenderness or deformity.   Right BKA, left foot amputation   Lymphadenopathy:     He has no cervical adenopathy.   Neurological: He is alert and oriented to person, place, and time. No cranial nerve deficit. He exhibits normal muscle tone. Coordination normal.   Skin: Skin is warm and dry. No rash noted. He is not diaphoretic. No erythema.   Psychiatric: He has a normal mood and affect. His behavior is normal. Judgment and thought content normal.   Nursing note and vitals reviewed.      Significant Labs: All pertinent labs within the past 24 hours have been reviewed.    Significant Imaging:   Imaging Results          X-Ray Chest 1 View (Final result)  Result time 08/11/17  19:56:15    Final result by Stefan Gagnon MD (08/11/17 19:56:15)                 Impression:     Tip of the NG tube not visualized. See above.      Electronically signed by: STEFAN GAGNON MD  Date:     08/11/17  Time:    19:56              Narrative:    Exam: Chest X-ray, one view.    History: Encounter for fitting and adjustment of other gastrointestinal appliance and device    Findings: Comparison is made to prior examination at 1536 hrs. NG tube has been placed, extending into the distal esophagus, tip not visualized. Consider AP view of the abdomen for further assessment.                             CT Abdomen Pelvis  Without Contrast (Final result)  Result time 08/11/17 18:38:49   Procedure changed from CT Abdomen Pelvis With Contrast     Final result by Stefan Gagnon MD (08/11/17 18:38:49)                 Impression:     Mid small bowel obstruction, likely related to postoperative adhesions. See above. No free air or pneumatosis identified.      Electronically signed by: STEFAN GAGNON MD  Date:     08/11/17  Time:    18:38              Narrative:    Examination: CT of the abdomen and pelvis without contrast.    History: Vomiting    Findings: Comparison is made to prior examination dated 03/20/2000 1534 she kidney again noted. Left ureteral stent remains in place. Postoperative changes again noted in the midline retroperitoneum.    Mid to proximal small bowel loops are dilated, with marked distention of the stomach as well. Contrast noted in the distal esophagus, likely related to reflux. Distal small bowel loops are decompressed, with transition point noted at the lower midline retroperitoneum, at the site of prior surgery. No free air or pneumatosis, and otherwise no significant change from prior exam.                             X-Ray Abdomen Flat And Erect (Final result)  Result time 08/11/17 15:53:16    Final result by Scooter Mario MD (08/11/17 15:53:16)                 Impression:      Bowel  gas pattern may be due to an ileus.  Small bowel obstruction not excluded.    Other findings as described above.      Electronically signed by: HILL MARIO MD  Date:     08/11/17  Time:    15:53              Narrative:    EXAM:  2 view abdomen, flat and erect    CLINICAL HISTORY: abdominal pain, unspecified without report of trauma    COMPARISON STUDIES: Abdominal series 04/22/2016    FINDINGS:    Abdomen: No definite free air is seen. Several surgical clips within the midabdomen.  Left ureteral stent.  atherosclerosis.    There are several air-fluid levels within the abdomen.  There are dilated small bowel loops..                             X-Ray Chest PA And Lateral (Final result)  Result time 08/11/17 15:51:11    Final result by Hill Mario MD (08/11/17 15:51:11)                 Impression:      No acute cardiopulmonary findings.  Please see above      Electronically signed by: HILL MARIO MD  Date:     08/11/17  Time:    15:51              Narrative:    EXAM: Chest X-ray PA and Lateral    CLINICAL HISTORY:     Vomiting, unspecified    COMPARISON STUDIES:     04/24/2017    FINDINGS:    Surgical clips in the right axilla.  There are surgical staples in the right lung.  Chronic blunting of the right costophrenic sulcus.  Left lung appears clear.  Normal heart size.. Chronic right rib deformities probably from previous thoracotomy.  Coronary artery stent.  Chronic compression of one of the midthoracic vertebral bodies.  Surgical staples and a ureteral stent is noted on the lateral view..

## 2017-08-12 NOTE — SUBJECTIVE & OBJECTIVE
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    Late complications of amputation stump     rseolved with further amputation( MRSA then none since 2014)    Lipoma of colon     colonoscopy 2/21/2014    Myocardial infarction     per patient 2000 & 9/2012    Peripheral vascular disease     Phantom limb syndrome     patient reports only intermittent not problematic, not worsening    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    Spinal cord disease     L4L5 disc    Tobacco dependence     resolved    Ureteral stent retained        Past Surgical History:   Procedure Laterality Date    ABDOMINAL AORTIC ANEURYSM REPAIR      ABDOMINAL AORTIC ANEURYSM REPAIR  1996/2014    AMPUTATION      AORTA - BILATERAL FEMORAL ARTERY BYPASS GRAFT  2014    Left and right leg    CORONARY ANGIOPLASTY WITH STENT PLACEMENT  2000    Three placed in heart    FOOT AMPUTATION THROUGH METATARSAL  1996    left    FOOT SURGERY Bilateral 1980's    per patient multiple toe amputations prior to.  partial foot amputation:first great toe then other toes     KIDNEY SURGERY  2014    per patient separation of horseshoe kidney @ time of AAA repair    LUNG LOBECTOMY Right 1970s    per patient not cancer    right below knee amputation  2009 (approx)    SMALL INTESTINE SURGERY  2014    per patient partial @ time of aaa repair  not small bowel - large bowel bowel compromised bythtwe AAAbowel    TONSILLECTOMY  1955 aprox    URETERAL STENT PLACEMENT Left     annually replaced since 2012 or so  Dr Jin       Review of patient's allergies indicates:   Allergen Reactions    Morphine  Itching     Family History     Problem Relation (Age of Onset)    COPD Mother    Cancer Mother    Diabetes Daughter    Heart disease Father        Social History Main Topics    Smoking status: Former Smoker     Packs/day: 1.00     Years: 15.00     Quit date: 1/1/2009    Smokeless tobacco: Never Used    Alcohol use No    Drug use: No      Comment: Is on prescription opiod, no non prescribed use    Sexual activity: Not Currently     Partners: Female     Review of Systems   Constitutional: Negative for appetite change, chills, fatigue, fever and unexpected weight change.   HENT: Negative for ear pain, sore throat, trouble swallowing and voice change.    Eyes: Negative for pain, redness and visual disturbance.   Respiratory: Negative for cough, choking, chest tightness, shortness of breath and wheezing.    Cardiovascular: Negative for chest pain, palpitations and leg swelling.   Gastrointestinal: Positive for abdominal distention, abdominal pain, nausea and vomiting. Negative for blood in stool, constipation and diarrhea.   Endocrine: Negative for cold intolerance, heat intolerance, polydipsia, polyphagia and polyuria.   Genitourinary: Negative for difficulty urinating, dysuria, flank pain and hematuria.   Musculoskeletal: Negative for arthralgias, back pain and gait problem.   Skin: Negative for color change, pallor and rash.   Neurological: Negative for dizziness, light-headedness and headaches.   Hematological: Negative for adenopathy. Does not bruise/bleed easily.   Psychiatric/Behavioral: Negative for agitation, behavioral problems and confusion.     Objective:     Vital Signs (Most Recent):  Temp: 97.4 °F (36.3 °C) (08/12/17 0744)  Pulse: 88 (08/12/17 0744)  Resp: 16 (08/12/17 0744)  BP: (!) 162/77 (08/12/17 0744)  SpO2: (!) 94 % (08/12/17 0744) Vital Signs (24h Range):  Temp:  [97.4 °F (36.3 °C)-98.8 °F (37.1 °C)] 97.4 °F (36.3 °C)  Pulse:  [] 88  Resp:  [15-17] 16  SpO2:  [93 %-98 %] 94 %  BP:  (150-167)/(70-99) 162/77     Weight: 90.7 kg (200 lb) (08/11/17 1435)  Body mass index is 26.39 kg/m².      Intake/Output Summary (Last 24 hours) at 08/12/17 0907  Last data filed at 08/12/17 0812   Gross per 24 hour   Intake              750 ml   Output              250 ml   Net              500 ml       Lines/Drains/Airways     Drain                 NG/OG Tube 08/11/17 1935 [place] sump 16 Fr. Right nostril less than 1 day          Peripheral Intravenous Line                 Peripheral IV - Single Lumen 08/11/17 Left Forearm 1 day                Physical Exam   Constitutional: He is oriented to person, place, and time. He appears well-developed and well-nourished. He appears distressed.   Uncomfortable appearing   HENT:   Head: Normocephalic and atraumatic.   Mouth/Throat: Oropharynx is clear and moist.   Eyes: Conjunctivae are normal. Pupils are equal, round, and reactive to light. No scleral icterus.   Neck: No thyromegaly present.   Cardiovascular: Normal rate, regular rhythm and normal heart sounds.    Pulmonary/Chest: Effort normal and breath sounds normal. He has no wheezes. He has no rales.   Abdominal: Soft. He exhibits distension. There is tenderness. There is no rebound and no guarding.   Bowel sounds are high pitched and tinkling; NG tube with bilious output and some coffee ground appearing material; Abdomen is tender diffusely.   Musculoskeletal: He exhibits no edema or tenderness.   Right BKA noted   Lymphadenopathy:     He has no cervical adenopathy.   Neurological: He is alert and oriented to person, place, and time.   Skin: No rash noted. No erythema. No pallor.   Psychiatric: He has a normal mood and affect. His behavior is normal.   Nursing note and vitals reviewed.      Significant Labs:  CBC:     Recent Labs  Lab 08/11/17  1546 08/11/17  1908 08/12/17  0527   WBC 9.99 7.92 4.03   HGB 14.7 13.0* 11.9*   HCT 42.2 38.3* 35.8*    128* 134*     CMP:     Recent Labs  Lab 08/12/17  0526   GLU  123*   CALCIUM 8.5*   ALBUMIN 3.1*   PROT 7.0      K 3.7   CO2 19*   *   BUN 24*   CREATININE 1.6*   ALKPHOS 98   ALT 13   AST 27   BILITOT 0.5     Coagulation:     Recent Labs  Lab 08/11/17  1552   INR 1.1   APTT 32.3*       Significant Imaging:  CT: I have reviewed all results within the past 24 hours and my personal findings are:  Evidence of SBO     Scheduled Meds:   pantoprazole  40 mg Intravenous BID     Continuous Infusions:   sodium chloride 0.9% 100 mL/hr at 08/11/17 2232     PRN Meds:.acetaminophen, guaifenesin 100 mg/5 ml, hydrALAZINE, ondansetron, promethazine (PHENERGAN) IVPB

## 2017-08-13 LAB
ANION GAP SERPL CALC-SCNC: 9 MMOL/L
BUN SERPL-MCNC: 22 MG/DL
CALCIUM SERPL-MCNC: 8.6 MG/DL
CHLORIDE SERPL-SCNC: 108 MMOL/L
CO2 SERPL-SCNC: 23 MMOL/L
CREAT SERPL-MCNC: 1.4 MG/DL
EST. GFR  (AFRICAN AMERICAN): 59 ML/MIN/1.73 M^2
EST. GFR  (NON AFRICAN AMERICAN): 51 ML/MIN/1.73 M^2
GLUCOSE SERPL-MCNC: 84 MG/DL
HCT VFR BLD AUTO: 31.8 %
HCT VFR BLD AUTO: 32.1 %
HGB BLD-MCNC: 10.3 G/DL
HGB BLD-MCNC: 10.6 G/DL
POTASSIUM SERPL-SCNC: 3.6 MMOL/L
SODIUM SERPL-SCNC: 140 MMOL/L

## 2017-08-13 PROCEDURE — 99232 SBSQ HOSP IP/OBS MODERATE 35: CPT | Mod: ,,, | Performed by: INTERNAL MEDICINE

## 2017-08-13 PROCEDURE — 36415 COLL VENOUS BLD VENIPUNCTURE: CPT

## 2017-08-13 PROCEDURE — 85014 HEMATOCRIT: CPT

## 2017-08-13 PROCEDURE — 21400001 HC TELEMETRY ROOM

## 2017-08-13 PROCEDURE — 99221 1ST HOSP IP/OBS SF/LOW 40: CPT | Mod: ,,, | Performed by: SURGERY

## 2017-08-13 PROCEDURE — 80048 BASIC METABOLIC PNL TOTAL CA: CPT

## 2017-08-13 PROCEDURE — 85014 HEMATOCRIT: CPT | Mod: 91

## 2017-08-13 PROCEDURE — 85018 HEMOGLOBIN: CPT | Mod: 91

## 2017-08-13 PROCEDURE — 63600175 PHARM REV CODE 636 W HCPCS: Performed by: EMERGENCY MEDICINE

## 2017-08-13 PROCEDURE — 25000003 PHARM REV CODE 250: Performed by: SURGERY

## 2017-08-13 PROCEDURE — 85018 HEMOGLOBIN: CPT

## 2017-08-13 PROCEDURE — C9113 INJ PANTOPRAZOLE SODIUM, VIA: HCPCS | Performed by: EMERGENCY MEDICINE

## 2017-08-13 PROCEDURE — 11000001 HC ACUTE MED/SURG PRIVATE ROOM

## 2017-08-13 PROCEDURE — 63600175 PHARM REV CODE 636 W HCPCS: Performed by: SURGERY

## 2017-08-13 RX ADMIN — SODIUM CHLORIDE, SODIUM LACTATE, POTASSIUM CHLORIDE, AND CALCIUM CHLORIDE: 600; 310; 30; 20 INJECTION, SOLUTION INTRAVENOUS at 07:08

## 2017-08-13 RX ADMIN — SODIUM CHLORIDE, SODIUM LACTATE, POTASSIUM CHLORIDE, AND CALCIUM CHLORIDE: 600; 310; 30; 20 INJECTION, SOLUTION INTRAVENOUS at 04:08

## 2017-08-13 RX ADMIN — PANTOPRAZOLE SODIUM 40 MG: 40 INJECTION, POWDER, FOR SOLUTION INTRAVENOUS at 08:08

## 2017-08-13 RX ADMIN — HYDROMORPHONE HYDROCHLORIDE 1 MG: 1 INJECTION, SOLUTION INTRAMUSCULAR; INTRAVENOUS; SUBCUTANEOUS at 04:08

## 2017-08-13 RX ADMIN — HYDROMORPHONE HYDROCHLORIDE 1 MG: 1 INJECTION, SOLUTION INTRAMUSCULAR; INTRAVENOUS; SUBCUTANEOUS at 05:08

## 2017-08-13 RX ADMIN — HYDROMORPHONE HYDROCHLORIDE 1 MG: 1 INJECTION, SOLUTION INTRAMUSCULAR; INTRAVENOUS; SUBCUTANEOUS at 09:08

## 2017-08-13 RX ADMIN — PANTOPRAZOLE SODIUM 40 MG: 40 INJECTION, POWDER, FOR SOLUTION INTRAVENOUS at 09:08

## 2017-08-13 RX ADMIN — HYDROMORPHONE HYDROCHLORIDE 1 MG: 1 INJECTION, SOLUTION INTRAMUSCULAR; INTRAVENOUS; SUBCUTANEOUS at 12:08

## 2017-08-13 RX ADMIN — SODIUM CHLORIDE, SODIUM LACTATE, POTASSIUM CHLORIDE, AND CALCIUM CHLORIDE: 600; 310; 30; 20 INJECTION, SOLUTION INTRAVENOUS at 11:08

## 2017-08-13 NOTE — SUBJECTIVE & OBJECTIVE
Subjective:     Interval History: Feeling better this AM. Not much NG output overnight. No blood in NG output. Had bowel movement earlier. Passing gas. Abd pain resolved.    Review of Systems   Constitutional: Negative.  Negative for chills, fatigue and fever.   Respiratory: Negative.    Cardiovascular: Negative.    Gastrointestinal: Negative.    Neurological: Negative.      Objective:     Vital Signs (Most Recent):  Temp: 98.3 °F (36.8 °C) (08/13/17 0740)  Pulse: 73 (08/13/17 0740)  Resp: 18 (08/13/17 0740)  BP: (!) 146/70 (08/13/17 0740)  SpO2: (!) 90 % (08/13/17 0740) Vital Signs (24h Range):  Temp:  [97.6 °F (36.4 °C)-98.7 °F (37.1 °C)] 98.3 °F (36.8 °C)  Pulse:  [73-82] 73  Resp:  [16-18] 18  SpO2:  [90 %-97 %] 90 %  BP: (146-166)/(70-81) 146/70     Weight: 90.7 kg (200 lb) (08/11/17 1435)  Body mass index is 26.39 kg/m².      Intake/Output Summary (Last 24 hours) at 08/13/17 0945  Last data filed at 08/13/17 0700   Gross per 24 hour   Intake          2548.34 ml   Output              950 ml   Net          1598.34 ml       Lines/Drains/Airways     Drain                 NG/OG Tube 08/11/17 1935 [place] sump 16 Fr. Right nostril 1 day          Peripheral Intravenous Line                 Peripheral IV - Single Lumen 08/11/17 Left Forearm 2 days                Physical Exam   Constitutional: He appears well-developed and well-nourished.   Cardiovascular: Normal rate, regular rhythm and normal heart sounds.    Pulmonary/Chest: Effort normal and breath sounds normal.   Abdominal: Soft. Bowel sounds are normal. He exhibits no distension. There is no tenderness.   NG tube with minimal yellow output. No blood.   Musculoskeletal: He exhibits no edema.   Nursing note and vitals reviewed.      Significant Labs:  CBC:   Recent Labs  Lab 08/11/17  1546 08/11/17  1908 08/12/17  0527 08/12/17  1557 08/13/17  0010 08/13/17  0755   WBC 9.99 7.92 4.03  --   --   --    HGB 14.7 13.0* 11.9* 11.3* 10.6* 10.3*   HCT 42.2 38.3* 35.8*  34.5* 32.1* 31.8*    128* 134*  --   --   --      CMP:   Recent Labs  Lab 08/12/17  0526 08/13/17  0546   * 84   CALCIUM 8.5* 8.6*   ALBUMIN 3.1*  --    PROT 7.0  --     140   K 3.7 3.6   CO2 19* 23   * 108   BUN 24* 22   CREATININE 1.6* 1.4   ALKPHOS 98  --    ALT 13  --    AST 27  --    BILITOT 0.5  --          Significant Imaging:  Abdominal Film: I have reviewed all results within the past 24 hours and my personal findings are:  some air fluid levels and some small bowel dilation noted.     Scheduled Meds:   pantoprazole  40 mg Intravenous BID     Continuous Infusions:   lactated Ringers 125 mL/hr at 08/13/17 0455     PRN Meds:.acetaminophen, guaifenesin 100 mg/5 ml, hydrALAZINE, HYDROmorphone, ondansetron, promethazine (PHENERGAN) IVPB

## 2017-08-13 NOTE — PLAN OF CARE
Problem: Patient Care Overview  Goal: Plan of Care Review  Outcome: Ongoing (interventions implemented as appropriate)  Pt having BMs. Denies any pain or nausea this shift. NG tube with small amount of clear, green output. IVF infusing as ordered. VSS. Chart reviewed. Will monitor.

## 2017-08-13 NOTE — ASSESSMENT & PLAN NOTE
- General Surgery following   - NGT in place.  - Supportive care  - IV fluids  - Pt reports large BM this morning

## 2017-08-13 NOTE — PLAN OF CARE
Problem: Patient Care Overview  Goal: Plan of Care Review  Outcome: Ongoing (interventions implemented as appropriate)  Chart check      Comments: Chart check done and POC reviewed with patient and spouse.  Patient had a large medium brown BM this shift, decrease in nausea, no emesis, no additional NG output.  Ice in moderation po and pain rated 4-6 controlled by medications prn.  Bed mobility visualized and patient resting quietly in bed watching television.

## 2017-08-13 NOTE — PLAN OF CARE
08/13/17 1556   Discharge Assessment   Assessment Type Discharge Planning Assessment   Patient/Family In Agreement With Plan unable to assess  (Patient asleep.  SW attempted contact with patient's spouse at [phone].  Message left requesting return call.)

## 2017-08-13 NOTE — ASSESSMENT & PLAN NOTE
Resolved. Slight drop in hgb. No blood in BM this AM. Continue PPI (transition to oral when taking PO). No plans for EGD at this time.

## 2017-08-13 NOTE — ASSESSMENT & PLAN NOTE
- Likely due to persistent vomiting.  - GI consulted, Dr. Tanner aware  - Protonix IV daily for now.  - monitor H/H

## 2017-08-13 NOTE — ASSESSMENT & PLAN NOTE
Passing stool and flatus and improved bowel sounds. No distension or tenderness on exam and minimal NG output. KUB still with some small bowel dilation. Clinically improving. Further management per surgery.

## 2017-08-13 NOTE — PROGRESS NOTES
"[facility] Hospital Medicine  Progress Note    Patient Name: Kodi Ribeiro  MRN: [id number]  Patient Class: IP- Inpatient   Admission Date: 8/11/2017  Length of Stay: 2 days  Attending Physician: Ta Cook MD  Primary Care Provider: Norma Nuñez MD        Subjective:     Principal Problem:Small bowel obstruction due to adhesions    HPI:  Mr. Ribeiro is a 66 y/o  male with h/o CAD, PAD, cardiac stents, CKD3, HTN, right BKA, AAA repair complicated by SBO, requiring surgery, left ureteral stent placement, presents to the ED c/o abdominal discomfort, nausea and vomiting since yesterday morning 1 AM. He thought it was "stomach bug" and would pass, but continued to get worse, hence presented to the ED. Last BM 3 days ago (wednesday).    X-Ray abdomen shows "several air-fluid levels within the abdomen.  There are dilated small bowel loops."    CT abdomen reveals "Mid to proximal small bowel loops are dilated, with marked distention of the stomach as well. Contrast noted in the distal esophagus, likely related to reflux. Distal small bowel loops are decompressed, with transition point noted at the lower midline retroperitoneum, at the site of prior surgery".     was contacted who will admit the patient. [facility] was consulted for medical management. Patient just had NG tube placed.    In the ED today, patient had coffee-ground emesis in the ED.  was consulted who recommend supportive care at this time.      Hospital Course:  8/12/17 No acute issues overnight. Pt reports pain is improved. Continue NG decompression. 8/13/17 Pt reports that he had " a really good bowel movement this morning". Pt reports improvement in symptoms. Continue NG compression. No current issues or complaints. ABD xray this morning showed increasing distention compared to prior exam.     Interval History: Pt reports that he had " a really good bowel movement this morning". Pt reports improvement in symptoms. " Continue NG compression. No current issues or complaints. ABD xray this morning showed increasing distention compared to prior exam.       Review of Systems   Constitutional: Negative.  Negative for activity change, appetite change, chills, fatigue, fever and unexpected weight change.   HENT: Negative.  Negative for congestion, facial swelling, nosebleeds, rhinorrhea, sinus pressure, sneezing and sore throat.    Eyes: Negative.  Negative for photophobia, discharge, redness and visual disturbance.   Respiratory: Negative.  Negative for apnea, cough, chest tightness, shortness of breath, wheezing and stridor.    Cardiovascular: Negative.  Negative for chest pain, palpitations and leg swelling.   Gastrointestinal: Positive for abdominal pain (discomfort), nausea and vomiting. Negative for abdominal distention, anal bleeding, blood in stool, constipation and diarrhea.   Endocrine: Negative.  Negative for polydipsia, polyphagia and polyuria.   Genitourinary: Negative.  Negative for difficulty urinating, discharge, dysuria, flank pain, frequency, hematuria and urgency.   Musculoskeletal: Negative.  Negative for arthralgias, back pain, gait problem, joint swelling, myalgias, neck pain and neck stiffness.   Skin: Negative.  Negative for color change, pallor and rash.   Allergic/Immunologic: Negative.  Negative for environmental allergies, food allergies and immunocompromised state.   Neurological: Negative.  Negative for dizziness, tremors, seizures, syncope, facial asymmetry, speech difficulty, weakness, light-headedness, numbness and headaches.   Hematological: Negative.    Psychiatric/Behavioral: Negative.  Negative for behavioral problems, confusion, hallucinations and suicidal ideas. The patient is not nervous/anxious.    All other systems reviewed and are negative.    Objective:     Vital Signs (Most Recent):  Temp: 98.7 °F (37.1 °C) (08/13/17 1250)  Pulse: 71 (08/13/17 1250)  Resp: 18 (08/13/17 1250)  BP: (!) 156/73  (08/13/17 1250)  SpO2: (!) 93 % (08/13/17 1250) Vital Signs (24h Range):  Temp:  [97.8 °F (36.6 °C)-98.7 °F (37.1 °C)] 98.7 °F (37.1 °C)  Pulse:  [71-78] 71  Resp:  [16-18] 18  SpO2:  [90 %-97 %] 93 %  BP: (146-166)/(70-81) 156/73     Weight: 90.7 kg (200 lb)  Body mass index is 26.39 kg/m².    Intake/Output Summary (Last 24 hours) at 08/13/17 1354  Last data filed at 08/13/17 1043   Gross per 24 hour   Intake          2548.34 ml   Output              800 ml   Net          1748.34 ml      Physical Exam   Constitutional: He is oriented to person, place, and time. He appears well-developed and well-nourished. No distress.   Appears uncomfortable, NGT in place.   HENT:   Head: Normocephalic and atraumatic.   Eyes: Conjunctivae and EOM are normal. Pupils are equal, round, and reactive to light. No scleral icterus.   Neck: Normal range of motion. Neck supple. No thyromegaly present.   Cardiovascular: Normal rate, regular rhythm, normal heart sounds and intact distal pulses.    No murmur heard.  Pulmonary/Chest: Effort normal and breath sounds normal. No respiratory distress. He has no wheezes. He exhibits no tenderness.   Abdominal: Soft. He exhibits no distension. Bowel sounds are decreased. There is no tenderness.   Musculoskeletal: Normal range of motion. He exhibits no edema, tenderness or deformity.   Right BKA, left foot amputation   Lymphadenopathy:     He has no cervical adenopathy.   Neurological: He is alert and oriented to person, place, and time. No cranial nerve deficit. He exhibits normal muscle tone. Coordination normal.   Skin: Skin is warm and dry. No rash noted. He is not diaphoretic. No erythema.   Psychiatric: He has a normal mood and affect. His behavior is normal. Judgment and thought content normal.   Nursing note and vitals reviewed.      Significant Labs: All pertinent labs within the past 24 hours have been reviewed.    Significant Imaging:   Imaging Results          X-Ray Abdomen Flat And Erect  (Final result)  Result time 08/13/17 09:56:39    Final result by Freddie Nelson MD (08/13/17 09:56:39)                 Impression:     Small bowel obstruction.  Increasing distention compared to prior exam.      Electronically signed by: FREDDIE NELSON MD  Date:     08/13/17  Time:    09:56              Narrative:    PROCEDURE: XR ABDOMEN FLAT AND ERECT, 08/13/17 08:43:02    HISTORY:  small bowel obstruction    COMPARISON STUDIES: August 11, 2017    FINDINGS:   Diffusely dilated small bowel loops throughout the abdomen with maximum diameter of approximately 4.4 cm.  Scattered surgical clips.  Small amounts of air in the colon with some contrast in the lower left colon.  Nasogastric tube present.  left sided nephroureteral stent.                             X-Ray Chest 1 View (Final result)  Result time 08/11/17 19:56:15    Final result by Stefan Gagnon MD (08/11/17 19:56:15)                 Impression:     Tip of the NG tube not visualized. See above.      Electronically signed by: STEFAN GAGNON MD  Date:     08/11/17  Time:    19:56              Narrative:    Exam: Chest X-ray, one view.    History: Encounter for fitting and adjustment of other gastrointestinal appliance and device    Findings: Comparison is made to prior examination at 1536 hrs. NG tube has been placed, extending into the distal esophagus, tip not visualized. Consider AP view of the abdomen for further assessment.                             CT Abdomen Pelvis  Without Contrast (Final result)  Result time 08/11/17 18:38:49   Procedure changed from CT Abdomen Pelvis With Contrast     Final result by Stefan Gagnon MD (08/11/17 18:38:49)                 Impression:     Mid small bowel obstruction, likely related to postoperative adhesions. See above. No free air or pneumatosis identified.      Electronically signed by: STEFAN GAGNON MD  Date:     08/11/17  Time:    18:38              Narrative:    Examination: CT of the abdomen and pelvis  without contrast.    History: Vomiting    Findings: Comparison is made to prior examination dated 03/20/2000 1534 she kidney again noted. Left ureteral stent remains in place. Postoperative changes again noted in the midline retroperitoneum.    Mid to proximal small bowel loops are dilated, with marked distention of the stomach as well. Contrast noted in the distal esophagus, likely related to reflux. Distal small bowel loops are decompressed, with transition point noted at the lower midline retroperitoneum, at the site of prior surgery. No free air or pneumatosis, and otherwise no significant change from prior exam.                             X-Ray Abdomen Flat And Erect (Final result)  Result time 08/11/17 15:53:16    Final result by Hill Mario MD (08/11/17 15:53:16)                 Impression:      Bowel gas pattern may be due to an ileus.  Small bowel obstruction not excluded.    Other findings as described above.      Electronically signed by: HILL MARIO MD  Date:     08/11/17  Time:    15:53              Narrative:    EXAM:  2 view abdomen, flat and erect    CLINICAL HISTORY: abdominal pain, unspecified without report of trauma    COMPARISON STUDIES: Abdominal series 04/22/2016    FINDINGS:    Abdomen: No definite free air is seen. Several surgical clips within the midabdomen.  Left ureteral stent.  atherosclerosis.    There are several air-fluid levels within the abdomen.  There are dilated small bowel loops..                             X-Ray Chest PA And Lateral (Final result)  Result time 08/11/17 15:51:11    Final result by Hill Mario MD (08/11/17 15:51:11)                 Impression:      No acute cardiopulmonary findings.  Please see above      Electronically signed by: HILL MARIO MD  Date:     08/11/17  Time:    15:51              Narrative:    EXAM: Chest X-ray PA and Lateral    CLINICAL HISTORY:     Vomiting, unspecified    COMPARISON STUDIES:     04/24/2017    FINDINGS:     Surgical clips in the right axilla.  There are surgical staples in the right lung.  Chronic blunting of the right costophrenic sulcus.  Left lung appears clear.  Normal heart size.. Chronic right rib deformities probably from previous thoracotomy.  Coronary artery stent.  Chronic compression of one of the midthoracic vertebral bodies.  Surgical staples and a ureteral stent is noted on the lateral view..                                 Assessment/Plan:      * Small bowel obstruction due to adhesions    - General Surgery following   - NGT in place.  - Supportive care  - IV fluids  - Pt reports large BM this morning         Coffee ground emesis    - Likely due to persistent vomiting.  - GI consulted, Dr. Tanner aware  - Protonix IV daily for now.  - monitor H/H        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    - H/o Right BKA          CAD;Old MI ; s/p stents x 3 (2000)     - Hold anti-platelet agents  - Denies chest pain or SOB at this time.          CKD (chronic kidney disease) stage 3, GFR 30-59 ml/min    - Serum creatinine at baseline  - Gentle IV hydration  - Monitor, labs in AM          Essential hypertension    - Hydralazine IV prn  - Hold oral BP medications while NGT in place  - Monitor and adjust as needed            VTE Risk Mitigation         Ordered     Medium Risk of VTE  Once      08/11/17 2210     Place sequential compression device  Until discontinued      08/11/17 2210     Reason for No Pharmacological VTE Prophylaxis  Once      08/11/17 2210     Place sequential compression device  Until discontinued      08/11/17 2210              Brennen Weiner NP  Department of Hospital Medicine   [facility]

## 2017-08-13 NOTE — PROGRESS NOTES
[facility]   Gastroenterology  Progress Note    Patient Name: Kodi Ribeiro  MRN: [id number]  Admission Date: 8/11/2017  Hospital Length of Stay: 2 days  Code Status: Full Code   Attending Provider: Ta Cook MD  Consulting Provider: Isaac Tanner MD  Primary Care Physician: Norma Nuñez MD  Principal Problem: Small bowel obstruction due to adhesions      Subjective:     Interval History: Feeling better this AM. Not much NG output overnight. No blood in NG output. Had bowel movement earlier. Passing gas. Abd pain resolved.    Review of Systems   Constitutional: Negative.  Negative for chills, fatigue and fever.   Respiratory: Negative.    Cardiovascular: Negative.    Gastrointestinal: Negative.    Neurological: Negative.      Objective:     Vital Signs (Most Recent):  Temp: 98.3 °F (36.8 °C) (08/13/17 0740)  Pulse: 73 (08/13/17 0740)  Resp: 18 (08/13/17 0740)  BP: (!) 146/70 (08/13/17 0740)  SpO2: (!) 90 % (08/13/17 0740) Vital Signs (24h Range):  Temp:  [97.6 °F (36.4 °C)-98.7 °F (37.1 °C)] 98.3 °F (36.8 °C)  Pulse:  [73-82] 73  Resp:  [16-18] 18  SpO2:  [90 %-97 %] 90 %  BP: (146-166)/(70-81) 146/70     Weight: 90.7 kg (200 lb) (08/11/17 1435)  Body mass index is 26.39 kg/m².      Intake/Output Summary (Last 24 hours) at 08/13/17 0945  Last data filed at 08/13/17 0700   Gross per 24 hour   Intake          2548.34 ml   Output              950 ml   Net          1598.34 ml       Lines/Drains/Airways     Drain                 NG/OG Tube 08/11/17 1935 Gillespie sump 16 Fr. Right nostril 1 day          Peripheral Intravenous Line                 Peripheral IV - Single Lumen 08/11/17 Left Forearm 2 days                Physical Exam   Constitutional: He appears well-developed and well-nourished.   Cardiovascular: Normal rate, regular rhythm and normal heart sounds.    Pulmonary/Chest: Effort normal and breath sounds normal.   Abdominal: Soft. Bowel sounds are normal. He exhibits no distension. There  is no tenderness.   NG tube with minimal yellow output. No blood.   Musculoskeletal: He exhibits no edema.   Nursing note and vitals reviewed.      Significant Labs:  CBC:   Recent Labs  Lab 08/11/17  1546 08/11/17  1908 08/12/17  0527 08/12/17  1557 08/13/17  0010 08/13/17  0755   WBC 9.99 7.92 4.03  --   --   --    HGB 14.7 13.0* 11.9* 11.3* 10.6* 10.3*   HCT 42.2 38.3* 35.8* 34.5* 32.1* 31.8*    128* 134*  --   --   --      CMP:   Recent Labs  Lab 08/12/17  0526 08/13/17  0546   * 84   CALCIUM 8.5* 8.6*   ALBUMIN 3.1*  --    PROT 7.0  --     140   K 3.7 3.6   CO2 19* 23   * 108   BUN 24* 22   CREATININE 1.6* 1.4   ALKPHOS 98  --    ALT 13  --    AST 27  --    BILITOT 0.5  --          Significant Imaging:  Abdominal Film: I have reviewed all results within the past 24 hours and my personal findings are:  some air fluid levels and some small bowel dilation noted.     Scheduled Meds:   pantoprazole  40 mg Intravenous BID     Continuous Infusions:   lactated Ringers 125 mL/hr at 08/13/17 0455     PRN Meds:.acetaminophen, guaifenesin 100 mg/5 ml, hydrALAZINE, HYDROmorphone, ondansetron, promethazine (PHENERGAN) IVPB      Assessment/Plan:     Coffee ground emesis    Resolved. Slight drop in hgb. No blood in BM this AM. Continue PPI (transition to oral when taking PO). No plans for EGD at this time.        * Small bowel obstruction due to adhesions    Passing stool and flatus and improved bowel sounds. No distension or tenderness on exam and minimal NG output. KUB still with some small bowel dilation. Clinically improving. Further management per surgery.            Thank you for your consult. I will follow-up with patient. Please contact us if you have any additional questions.    Isaac Tanner MD  Gastroenterology  [facility]

## 2017-08-13 NOTE — SUBJECTIVE & OBJECTIVE
"Interval History: Pt reports that he had " a really good bowel movement this morning". Pt reports improvement in symptoms. Continue NG compression. No current issues or complaints. ABD xray this morning showed increasing distention compared to prior exam.       Review of Systems   Constitutional: Negative.  Negative for activity change, appetite change, chills, fatigue, fever and unexpected weight change.   HENT: Negative.  Negative for congestion, facial swelling, nosebleeds, rhinorrhea, sinus pressure, sneezing and sore throat.    Eyes: Negative.  Negative for photophobia, discharge, redness and visual disturbance.   Respiratory: Negative.  Negative for apnea, cough, chest tightness, shortness of breath, wheezing and stridor.    Cardiovascular: Negative.  Negative for chest pain, palpitations and leg swelling.   Gastrointestinal: Positive for abdominal pain (discomfort), nausea and vomiting. Negative for abdominal distention, anal bleeding, blood in stool, constipation and diarrhea.   Endocrine: Negative.  Negative for polydipsia, polyphagia and polyuria.   Genitourinary: Negative.  Negative for difficulty urinating, discharge, dysuria, flank pain, frequency, hematuria and urgency.   Musculoskeletal: Negative.  Negative for arthralgias, back pain, gait problem, joint swelling, myalgias, neck pain and neck stiffness.   Skin: Negative.  Negative for color change, pallor and rash.   Allergic/Immunologic: Negative.  Negative for environmental allergies, food allergies and immunocompromised state.   Neurological: Negative.  Negative for dizziness, tremors, seizures, syncope, facial asymmetry, speech difficulty, weakness, light-headedness, numbness and headaches.   Hematological: Negative.    Psychiatric/Behavioral: Negative.  Negative for behavioral problems, confusion, hallucinations and suicidal ideas. The patient is not nervous/anxious.    All other systems reviewed and are negative.    Objective:     Vital Signs " (Most Recent):  Temp: 98.7 °F (37.1 °C) (08/13/17 1250)  Pulse: 71 (08/13/17 1250)  Resp: 18 (08/13/17 1250)  BP: (!) 156/73 (08/13/17 1250)  SpO2: (!) 93 % (08/13/17 1250) Vital Signs (24h Range):  Temp:  [97.8 °F (36.6 °C)-98.7 °F (37.1 °C)] 98.7 °F (37.1 °C)  Pulse:  [71-78] 71  Resp:  [16-18] 18  SpO2:  [90 %-97 %] 93 %  BP: (146-166)/(70-81) 156/73     Weight: 90.7 kg (200 lb)  Body mass index is 26.39 kg/m².    Intake/Output Summary (Last 24 hours) at 08/13/17 1354  Last data filed at 08/13/17 1043   Gross per 24 hour   Intake          2548.34 ml   Output              800 ml   Net          1748.34 ml      Physical Exam   Constitutional: He is oriented to person, place, and time. He appears well-developed and well-nourished. No distress.   Appears uncomfortable, NGT in place.   HENT:   Head: Normocephalic and atraumatic.   Eyes: Conjunctivae and EOM are normal. Pupils are equal, round, and reactive to light. No scleral icterus.   Neck: Normal range of motion. Neck supple. No thyromegaly present.   Cardiovascular: Normal rate, regular rhythm, normal heart sounds and intact distal pulses.    No murmur heard.  Pulmonary/Chest: Effort normal and breath sounds normal. No respiratory distress. He has no wheezes. He exhibits no tenderness.   Abdominal: Soft. He exhibits no distension. Bowel sounds are decreased. There is no tenderness.   Musculoskeletal: Normal range of motion. He exhibits no edema, tenderness or deformity.   Right BKA, left foot amputation   Lymphadenopathy:     He has no cervical adenopathy.   Neurological: He is alert and oriented to person, place, and time. No cranial nerve deficit. He exhibits normal muscle tone. Coordination normal.   Skin: Skin is warm and dry. No rash noted. He is not diaphoretic. No erythema.   Psychiatric: He has a normal mood and affect. His behavior is normal. Judgment and thought content normal.   Nursing note and vitals reviewed.      Significant Labs: All pertinent labs  within the past 24 hours have been reviewed.    Significant Imaging:   Imaging Results          X-Ray Abdomen Flat And Erect (Final result)  Result time 08/13/17 09:56:39    Final result by Frdedie Nelson MD (08/13/17 09:56:39)                 Impression:     Small bowel obstruction.  Increasing distention compared to prior exam.      Electronically signed by: FREDDIE NELSON MD  Date:     08/13/17  Time:    09:56              Narrative:    PROCEDURE: XR ABDOMEN FLAT AND ERECT, 08/13/17 08:43:02    HISTORY:  small bowel obstruction    COMPARISON STUDIES: August 11, 2017    FINDINGS:   Diffusely dilated small bowel loops throughout the abdomen with maximum diameter of approximately 4.4 cm.  Scattered surgical clips.  Small amounts of air in the colon with some contrast in the lower left colon.  Nasogastric tube present.  left sided nephroureteral stent.                             X-Ray Chest 1 View (Final result)  Result time 08/11/17 19:56:15    Final result by Stefan Gagnon MD (08/11/17 19:56:15)                 Impression:     Tip of the NG tube not visualized. See above.      Electronically signed by: STEFAN GAGNON MD  Date:     08/11/17  Time:    19:56              Narrative:    Exam: Chest X-ray, one view.    History: Encounter for fitting and adjustment of other gastrointestinal appliance and device    Findings: Comparison is made to prior examination at 1536 hrs. NG tube has been placed, extending into the distal esophagus, tip not visualized. Consider AP view of the abdomen for further assessment.                             CT Abdomen Pelvis  Without Contrast (Final result)  Result time 08/11/17 18:38:49   Procedure changed from CT Abdomen Pelvis With Contrast     Final result by Stefan Gagnon MD (08/11/17 18:38:49)                 Impression:     Mid small bowel obstruction, likely related to postoperative adhesions. See above. No free air or pneumatosis identified.      Electronically signed  by: BARRY GAYLE MD  Date:     08/11/17  Time:    18:38              Narrative:    Examination: CT of the abdomen and pelvis without contrast.    History: Vomiting    Findings: Comparison is made to prior examination dated 03/20/2000 1534 she kidney again noted. Left ureteral stent remains in place. Postoperative changes again noted in the midline retroperitoneum.    Mid to proximal small bowel loops are dilated, with marked distention of the stomach as well. Contrast noted in the distal esophagus, likely related to reflux. Distal small bowel loops are decompressed, with transition point noted at the lower midline retroperitoneum, at the site of prior surgery. No free air or pneumatosis, and otherwise no significant change from prior exam.                             X-Ray Abdomen Flat And Erect (Final result)  Result time 08/11/17 15:53:16    Final result by Hill Mario MD (08/11/17 15:53:16)                 Impression:      Bowel gas pattern may be due to an ileus.  Small bowel obstruction not excluded.    Other findings as described above.      Electronically signed by: HILL MARIO MD  Date:     08/11/17  Time:    15:53              Narrative:    EXAM:  2 view abdomen, flat and erect    CLINICAL HISTORY: abdominal pain, unspecified without report of trauma    COMPARISON STUDIES: Abdominal series 04/22/2016    FINDINGS:    Abdomen: No definite free air is seen. Several surgical clips within the midabdomen.  Left ureteral stent.  atherosclerosis.    There are several air-fluid levels within the abdomen.  There are dilated small bowel loops..                             X-Ray Chest PA And Lateral (Final result)  Result time 08/11/17 15:51:11    Final result by Hill Mario MD (08/11/17 15:51:11)                 Impression:      No acute cardiopulmonary findings.  Please see above      Electronically signed by: HILL MARIO MD  Date:     08/11/17  Time:    15:51              Narrative:    EXAM:  Chest X-ray PA and Lateral    CLINICAL HISTORY:     Vomiting, unspecified    COMPARISON STUDIES:     04/24/2017    FINDINGS:    Surgical clips in the right axilla.  There are surgical staples in the right lung.  Chronic blunting of the right costophrenic sulcus.  Left lung appears clear.  Normal heart size.. Chronic right rib deformities probably from previous thoracotomy.  Coronary artery stent.  Chronic compression of one of the midthoracic vertebral bodies.  Surgical staples and a ureteral stent is noted on the lateral view..

## 2017-08-14 LAB
BASOPHILS # BLD AUTO: 0.02 K/UL
BASOPHILS NFR BLD: 0.4 %
DIFFERENTIAL METHOD: ABNORMAL
EOSINOPHIL # BLD AUTO: 0.2 K/UL
EOSINOPHIL NFR BLD: 3.8 %
ERYTHROCYTE [DISTWIDTH] IN BLOOD BY AUTOMATED COUNT: 13.3 %
HCT VFR BLD AUTO: 31.6 %
HGB BLD-MCNC: 10.5 G/DL
LYMPHOCYTES # BLD AUTO: 1.1 K/UL
LYMPHOCYTES NFR BLD: 22.2 %
MCH RBC QN AUTO: 29.6 PG
MCHC RBC AUTO-ENTMCNC: 33.2 G/DL
MCV RBC AUTO: 89 FL
MONOCYTES # BLD AUTO: 0.5 K/UL
MONOCYTES NFR BLD: 9.5 %
NEUTROPHILS # BLD AUTO: 3.2 K/UL
NEUTROPHILS NFR BLD: 64.1 %
PLATELET # BLD AUTO: 128 K/UL
PMV BLD AUTO: 10.1 FL
RBC # BLD AUTO: 3.55 M/UL
WBC # BLD AUTO: 4.95 K/UL

## 2017-08-14 PROCEDURE — 63600175 PHARM REV CODE 636 W HCPCS: Performed by: INTERNAL MEDICINE

## 2017-08-14 PROCEDURE — 36415 COLL VENOUS BLD VENIPUNCTURE: CPT

## 2017-08-14 PROCEDURE — 63600175 PHARM REV CODE 636 W HCPCS: Performed by: EMERGENCY MEDICINE

## 2017-08-14 PROCEDURE — C9113 INJ PANTOPRAZOLE SODIUM, VIA: HCPCS | Performed by: EMERGENCY MEDICINE

## 2017-08-14 PROCEDURE — 63600175 PHARM REV CODE 636 W HCPCS: Performed by: SURGERY

## 2017-08-14 PROCEDURE — 21400001 HC TELEMETRY ROOM

## 2017-08-14 PROCEDURE — 25000003 PHARM REV CODE 250: Performed by: INTERNAL MEDICINE

## 2017-08-14 PROCEDURE — 25000003 PHARM REV CODE 250: Performed by: SURGERY

## 2017-08-14 PROCEDURE — 85025 COMPLETE CBC W/AUTO DIFF WBC: CPT

## 2017-08-14 RX ORDER — AMLODIPINE BESYLATE 10 MG/1
10 TABLET ORAL DAILY
Status: DISCONTINUED | OUTPATIENT
Start: 2017-08-14 | End: 2017-08-21 | Stop reason: HOSPADM

## 2017-08-14 RX ADMIN — HYDROMORPHONE HYDROCHLORIDE 1 MG: 1 INJECTION, SOLUTION INTRAMUSCULAR; INTRAVENOUS; SUBCUTANEOUS at 10:08

## 2017-08-14 RX ADMIN — HYDROMORPHONE HYDROCHLORIDE 1 MG: 1 INJECTION, SOLUTION INTRAMUSCULAR; INTRAVENOUS; SUBCUTANEOUS at 08:08

## 2017-08-14 RX ADMIN — PANTOPRAZOLE SODIUM 40 MG: 40 INJECTION, POWDER, FOR SOLUTION INTRAVENOUS at 08:08

## 2017-08-14 RX ADMIN — HYDRALAZINE HYDROCHLORIDE 10 MG: 20 INJECTION INTRAMUSCULAR; INTRAVENOUS at 08:08

## 2017-08-14 RX ADMIN — SODIUM CHLORIDE, SODIUM LACTATE, POTASSIUM CHLORIDE, AND CALCIUM CHLORIDE: 600; 310; 30; 20 INJECTION, SOLUTION INTRAVENOUS at 03:08

## 2017-08-14 RX ADMIN — SODIUM CHLORIDE, SODIUM LACTATE, POTASSIUM CHLORIDE, AND CALCIUM CHLORIDE: 600; 310; 30; 20 INJECTION, SOLUTION INTRAVENOUS at 12:08

## 2017-08-14 RX ADMIN — SODIUM CHLORIDE, SODIUM LACTATE, POTASSIUM CHLORIDE, AND CALCIUM CHLORIDE: 600; 310; 30; 20 INJECTION, SOLUTION INTRAVENOUS at 10:08

## 2017-08-14 RX ADMIN — HYDROMORPHONE HYDROCHLORIDE 1 MG: 1 INJECTION, SOLUTION INTRAMUSCULAR; INTRAVENOUS; SUBCUTANEOUS at 05:08

## 2017-08-14 RX ADMIN — AMLODIPINE BESYLATE 10 MG: 10 TABLET ORAL at 09:08

## 2017-08-14 RX ADMIN — HYDROMORPHONE HYDROCHLORIDE 1 MG: 1 INJECTION, SOLUTION INTRAMUSCULAR; INTRAVENOUS; SUBCUTANEOUS at 01:08

## 2017-08-14 NOTE — SUBJECTIVE & OBJECTIVE
Interval History: The patients NG tube inadvertently came out overnight. Ok per primary team to leave it out. Pts last BM was yesterday, pt is passing flatus. No current complaints.       Review of Systems   Constitutional: Negative.  Negative for activity change, appetite change, chills, fatigue, fever and unexpected weight change.   HENT: Negative.  Negative for congestion, facial swelling, nosebleeds, rhinorrhea, sinus pressure, sneezing and sore throat.    Eyes: Negative.  Negative for photophobia, discharge, redness and visual disturbance.   Respiratory: Negative.  Negative for apnea, cough, chest tightness, shortness of breath, wheezing and stridor.    Cardiovascular: Negative.  Negative for chest pain, palpitations and leg swelling.   Gastrointestinal: Positive for abdominal pain (discomfort), nausea and vomiting. Negative for abdominal distention, anal bleeding, blood in stool, constipation and diarrhea.   Endocrine: Negative.  Negative for polydipsia, polyphagia and polyuria.   Genitourinary: Negative.  Negative for difficulty urinating, discharge, dysuria, flank pain, frequency, hematuria and urgency.   Musculoskeletal: Negative.  Negative for arthralgias, back pain, gait problem, joint swelling, myalgias, neck pain and neck stiffness.   Skin: Negative.  Negative for color change, pallor and rash.   Allergic/Immunologic: Negative.  Negative for environmental allergies, food allergies and immunocompromised state.   Neurological: Negative.  Negative for dizziness, tremors, seizures, syncope, facial asymmetry, speech difficulty, weakness, light-headedness, numbness and headaches.   Hematological: Negative.    Psychiatric/Behavioral: Negative.  Negative for behavioral problems, confusion, hallucinations and suicidal ideas. The patient is not nervous/anxious.    All other systems reviewed and are negative.    Objective:     Vital Signs (Most Recent):  Temp: 98.2 °F (36.8 °C) (08/14/17 1136)  Pulse: 72 (08/14/17  1136)  Resp: 18 (08/14/17 1136)  BP: (!) 166/77 (08/14/17 1136)  SpO2: 95 % (08/14/17 1136) Vital Signs (24h Range):  Temp:  [97.5 °F (36.4 °C)-98.7 °F (37.1 °C)] 98.2 °F (36.8 °C)  Pulse:  [67-89] 72  Resp:  [18-20] 18  SpO2:  [91 %-98 %] 95 %  BP: (125-166)/(67-79) 166/77     Weight: 90.7 kg (200 lb)  Body mass index is 26.39 kg/m².    Intake/Output Summary (Last 24 hours) at 08/14/17 1211  Last data filed at 08/14/17 1000   Gross per 24 hour   Intake          2768.74 ml   Output             2600 ml   Net           168.74 ml      Physical Exam   Constitutional: He is oriented to person, place, and time. He appears well-developed and well-nourished. No distress.   Appears uncomfortable.   HENT:   Head: Normocephalic and atraumatic.   Eyes: Conjunctivae and EOM are normal. Pupils are equal, round, and reactive to light. No scleral icterus.   Neck: Normal range of motion. Neck supple. No thyromegaly present.   Cardiovascular: Normal rate, regular rhythm, normal heart sounds and intact distal pulses.    No murmur heard.  Pulmonary/Chest: Effort normal and breath sounds normal. No respiratory distress. He has no wheezes. He exhibits no tenderness.   Abdominal: Soft. He exhibits no distension. Bowel sounds are decreased. There is no tenderness.   Musculoskeletal: Normal range of motion. He exhibits no edema, tenderness or deformity.   Right BKA, left foot amputation   Lymphadenopathy:     He has no cervical adenopathy.   Neurological: He is alert and oriented to person, place, and time. No cranial nerve deficit. He exhibits normal muscle tone. Coordination normal.   Skin: Skin is warm and dry. No rash noted. He is not diaphoretic. No erythema.   Psychiatric: He has a normal mood and affect. His behavior is normal. Judgment and thought content normal.   Nursing note and vitals reviewed.      Significant Labs: All pertinent labs within the past 24 hours have been reviewed.    Significant Imaging:   Imaging Results           X-Ray Abdomen Flat And Erect (Final result)  Result time 08/13/17 09:56:39    Final result by Freddie Nelson MD (08/13/17 09:56:39)                 Impression:     Small bowel obstruction.  Increasing distention compared to prior exam.      Electronically signed by: FREDDIE NELSON MD  Date:     08/13/17  Time:    09:56              Narrative:    PROCEDURE: XR ABDOMEN FLAT AND ERECT, 08/13/17 08:43:02    HISTORY:  small bowel obstruction    COMPARISON STUDIES: August 11, 2017    FINDINGS:   Diffusely dilated small bowel loops throughout the abdomen with maximum diameter of approximately 4.4 cm.  Scattered surgical clips.  Small amounts of air in the colon with some contrast in the lower left colon.  Nasogastric tube present.  left sided nephroureteral stent.                             X-Ray Chest 1 View (Final result)  Result time 08/11/17 19:56:15    Final result by Stefan Gagnon MD (08/11/17 19:56:15)                 Impression:     Tip of the NG tube not visualized. See above.      Electronically signed by: STEFAN GAGNON MD  Date:     08/11/17  Time:    19:56              Narrative:    Exam: Chest X-ray, one view.    History: Encounter for fitting and adjustment of other gastrointestinal appliance and device    Findings: Comparison is made to prior examination at 1536 hrs. NG tube has been placed, extending into the distal esophagus, tip not visualized. Consider AP view of the abdomen for further assessment.                             CT Abdomen Pelvis  Without Contrast (Final result)  Result time 08/11/17 18:38:49   Procedure changed from CT Abdomen Pelvis With Contrast     Final result by Stefan Gagnon MD (08/11/17 18:38:49)                 Impression:     Mid small bowel obstruction, likely related to postoperative adhesions. See above. No free air or pneumatosis identified.      Electronically signed by: STEFAN GAGNON MD  Date:     08/11/17  Time:    18:38              Narrative:    Examination: CT  of the abdomen and pelvis without contrast.    History: Vomiting    Findings: Comparison is made to prior examination dated 03/20/2000 1534 she kidney again noted. Left ureteral stent remains in place. Postoperative changes again noted in the midline retroperitoneum.    Mid to proximal small bowel loops are dilated, with marked distention of the stomach as well. Contrast noted in the distal esophagus, likely related to reflux. Distal small bowel loops are decompressed, with transition point noted at the lower midline retroperitoneum, at the site of prior surgery. No free air or pneumatosis, and otherwise no significant change from prior exam.                             X-Ray Abdomen Flat And Erect (Final result)  Result time 08/11/17 15:53:16    Final result by Hill Mario MD (08/11/17 15:53:16)                 Impression:      Bowel gas pattern may be due to an ileus.  Small bowel obstruction not excluded.    Other findings as described above.      Electronically signed by: HILL MARIO MD  Date:     08/11/17  Time:    15:53              Narrative:    EXAM:  2 view abdomen, flat and erect    CLINICAL HISTORY: abdominal pain, unspecified without report of trauma    COMPARISON STUDIES: Abdominal series 04/22/2016    FINDINGS:    Abdomen: No definite free air is seen. Several surgical clips within the midabdomen.  Left ureteral stent.  atherosclerosis.    There are several air-fluid levels within the abdomen.  There are dilated small bowel loops..                             X-Ray Chest PA And Lateral (Final result)  Result time 08/11/17 15:51:11    Final result by Hill Mario MD (08/11/17 15:51:11)                 Impression:      No acute cardiopulmonary findings.  Please see above      Electronically signed by: HILL MARIO MD  Date:     08/11/17  Time:    15:51              Narrative:    EXAM: Chest X-ray PA and Lateral    CLINICAL HISTORY:     Vomiting, unspecified    COMPARISON STUDIES:      04/24/2017    FINDINGS:    Surgical clips in the right axilla.  There are surgical staples in the right lung.  Chronic blunting of the right costophrenic sulcus.  Left lung appears clear.  Normal heart size.. Chronic right rib deformities probably from previous thoracotomy.  Coronary artery stent.  Chronic compression of one of the midthoracic vertebral bodies.  Surgical staples and a ureteral stent is noted on the lateral view..

## 2017-08-14 NOTE — PROGRESS NOTES
[facility] -   General Surgery  Progress Note    Subjective:     History of Present Illness:  Admitted with known abdominal pain which started several days ago and worsened with persistent vomiting. He had previous bowel surgery in 2014, and small obstruction in the past. 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     Post-Op Info:  * No surgery found *         Interval History: +large BM, no nausea/vomiting, no pain    Medications:  Continuous Infusions:   lactated Ringers 125 mL/hr at 08/14/17 1255     Scheduled Meds:   pantoprazole  40 mg Intravenous BID     PRN Meds:acetaminophen, guaifenesin 100 mg/5 ml, hydrALAZINE, HYDROmorphone, ondansetron, promethazine (PHENERGAN) IVPB     Review of patient's allergies indicates:   Allergen Reactions    Morphine Itching     Objective:     Vital Signs (Most Recent):  Temp: 98.2 °F (36.8 °C) (08/14/17 1136)  Pulse: 72 (08/14/17 1136)  Resp: 18 (08/14/17 1136)  BP: (!) 166/77 (08/14/17 1136)  SpO2: 95 % (08/14/17 1136) Vital Signs (24h Range):  Temp:  [97.5 °F (36.4 °C)-98.7 °F (37.1 °C)] 98.2 °F (36.8 °C)  Pulse:  [67-89] 72  Resp:  [18-20] 18  SpO2:  [91 %-98 %] 95 %  BP: (125-166)/(67-79) 166/77     Weight: 90.7 kg (200 lb)  Body mass index is 26.39 kg/m².    Intake/Output - Last 3 Shifts       08/12 0700 - 08/13 0659 08/13 0700 - 08/14 0659 08/14 0700 - 08/15 0659    P.O. 90 0     I.V. (mL/kg) 2041.7 (22.5) 3185.4 (35.1)     IV Piggyback       Total Intake(mL/kg) 2131.7 (23.5) 3185.4 (35.1)     Urine (mL/kg/hr) 900 (0.4) 2250 (1) 800 (1.3)    Drains 50 (0) 100 (0)     Stool 0 (0) 0 (0)     Total Output 950 2350 800    Net +1181.7 +835.4 -800           Stool Occurrence 1 x 1 x           Physical Exam   Constitutional: He is oriented to person, place, and time. He appears well-developed and well-nourished.   HENT:   Head: Normocephalic and atraumatic.   Eyes: EOM are normal.   Cardiovascular: Normal rate and regular rhythm.    Pulmonary/Chest:  Effort normal. No respiratory distress.   Abdominal: Soft. He exhibits no distension. There is no tenderness.   Musculoskeletal: Normal range of motion.   Neurological: He is alert and oriented to person, place, and time.   Skin: Skin is warm and dry.   Psychiatric: He has a normal mood and affect. Thought content normal.   Vitals reviewed.      Significant Labs:  CBC:   Recent Labs  Lab 08/14/17  0531   WBC 4.95   RBC 3.55*   HGB 10.5*   HCT 31.6*   *   MCV 89   MCH 29.6   MCHC 33.2     CMP:   Recent Labs  Lab 08/12/17  0526 08/13/17  0546   * 84   CALCIUM 8.5* 8.6*   ALBUMIN 3.1*  --    PROT 7.0  --     140   K 3.7 3.6   CO2 19* 23   * 108   BUN 24* 22   CREATININE 1.6* 1.4   ALKPHOS 98  --    ALT 13  --    AST 27  --    BILITOT 0.5  --        Significant Diagnostics:  I have reviewed all pertinent imaging results/findings within the past 24 hours.    Assessment/Plan:     * Small bowel obstruction due to adhesions    NG removed. Start clear liquids, ambulate. Advance diet tomorrow            Josie Bravo PA-C  General Surgery  [facility] [place]

## 2017-08-14 NOTE — PLAN OF CARE
Problem: Patient Care Overview  Goal: Plan of Care Review  Outcome: Ongoing (interventions implemented as appropriate)  Pt remained free of injury during shift, stable condition, pain adequately controlled with PRN medication and sleeping between care, remained NPO except ice chips, denied nausea, passing flatus but had BM last 8/13/17, had NG in R nare until after midnight to low wall suction (producing clear green output) but Pt inadvertently removed NG tube and Dr. Cook stated it was okay to leave out, no acute distress, receiving IV fluids, on cardiac monitoring (SR with PVCs), and will continue to monitor. 24hr chart review performed.

## 2017-08-14 NOTE — ASSESSMENT & PLAN NOTE
- General Surgery primary  - NGT inadvertently removed last night.  - Supportive care  - IV fluids  - Pt reports large BM yesterday, passing flatus

## 2017-08-14 NOTE — SUBJECTIVE & OBJECTIVE
Interval History: +large BM, no nausea/vomiting, no pain    Medications:  Continuous Infusions:   lactated Ringers 125 mL/hr at 08/14/17 1255     Scheduled Meds:   pantoprazole  40 mg Intravenous BID     PRN Meds:acetaminophen, guaifenesin 100 mg/5 ml, hydrALAZINE, HYDROmorphone, ondansetron, promethazine (PHENERGAN) IVPB     Review of patient's allergies indicates:   Allergen Reactions    Morphine Itching     Objective:     Vital Signs (Most Recent):  Temp: 98.2 °F (36.8 °C) (08/14/17 1136)  Pulse: 72 (08/14/17 1136)  Resp: 18 (08/14/17 1136)  BP: (!) 166/77 (08/14/17 1136)  SpO2: 95 % (08/14/17 1136) Vital Signs (24h Range):  Temp:  [97.5 °F (36.4 °C)-98.7 °F (37.1 °C)] 98.2 °F (36.8 °C)  Pulse:  [67-89] 72  Resp:  [18-20] 18  SpO2:  [91 %-98 %] 95 %  BP: (125-166)/(67-79) 166/77     Weight: 90.7 kg (200 lb)  Body mass index is 26.39 kg/m².    Intake/Output - Last 3 Shifts       08/12 0700 - 08/13 0659 08/13 0700 - 08/14 0659 08/14 0700 - 08/15 0659    P.O. 90 0     I.V. (mL/kg) 2041.7 (22.5) 3185.4 (35.1)     IV Piggyback       Total Intake(mL/kg) 2131.7 (23.5) 3185.4 (35.1)     Urine (mL/kg/hr) 900 (0.4) 2250 (1) 800 (1.3)    Drains 50 (0) 100 (0)     Stool 0 (0) 0 (0)     Total Output 950 2350 800    Net +1181.7 +835.4 -800           Stool Occurrence 1 x 1 x           Physical Exam   Constitutional: He is oriented to person, place, and time. He appears well-developed and well-nourished.   HENT:   Head: Normocephalic and atraumatic.   Eyes: EOM are normal.   Cardiovascular: Normal rate and regular rhythm.    Pulmonary/Chest: Effort normal. No respiratory distress.   Abdominal: Soft. He exhibits no distension. There is no tenderness.   Musculoskeletal: Normal range of motion.   Neurological: He is alert and oriented to person, place, and time.   Skin: Skin is warm and dry.   Psychiatric: He has a normal mood and affect. Thought content normal.   Vitals reviewed.      Significant Labs:  CBC:   Recent Labs  Lab  08/14/17  0531   WBC 4.95   RBC 3.55*   HGB 10.5*   HCT 31.6*   *   MCV 89   MCH 29.6   MCHC 33.2     CMP:   Recent Labs  Lab 08/12/17  0526 08/13/17  0546   * 84   CALCIUM 8.5* 8.6*   ALBUMIN 3.1*  --    PROT 7.0  --     140   K 3.7 3.6   CO2 19* 23   * 108   BUN 24* 22   CREATININE 1.6* 1.4   ALKPHOS 98  --    ALT 13  --    AST 27  --    BILITOT 0.5  --        Significant Diagnostics:  I have reviewed all pertinent imaging results/findings within the past 24 hours.

## 2017-08-14 NOTE — NURSING
Pt inadvertently removed NG tube. Dr. Cook contacted, he ordered that it was okay to leave the NG tube out. Will continue to monitor.

## 2017-08-14 NOTE — PLAN OF CARE
Problem: Patient Care Overview  Goal: Plan of Care Review  Outcome: Ongoing (interventions implemented as appropriate)  Plan of care reviewed and discussed with patient.  No acute distress noted.  Safety and fall precautions reviewed and discussed with patient.  Patient free from injury.  Clear liquid diet initiated.  IV hydration maintained.  Ambulation promoted.  Pain managed adequately.  Call bell and personal items in reach.  Bed in low position and locked.  Side rails up x2.  Encouraged to call if any assistance needed.  Will continue to monitor.

## 2017-08-14 NOTE — PROGRESS NOTES
"[facility] Hospital Medicine  Progress Note    Patient Name: Kodi Ribeiro  MRN: [id number]  Patient Class: IP- Inpatient   Admission Date: 8/11/2017  Length of Stay: 3 days  Attending Physician: Ta Cook MD  Primary Care Provider: Norma Nuñez MD        Subjective:     Principal Problem:Small bowel obstruction due to adhesions    HPI:  Mr. Ribeiro is a 66 y/o  male with h/o CAD, PAD, cardiac stents, CKD3, HTN, right BKA, AAA repair complicated by SBO, requiring surgery, left ureteral stent placement, presents to the ED c/o abdominal discomfort, nausea and vomiting since yesterday morning 1 AM. He thought it was "stomach bug" and would pass, but continued to get worse, hence presented to the ED. Last BM 3 days ago (wednesday).    X-Ray abdomen shows "several air-fluid levels within the abdomen.  There are dilated small bowel loops."    CT abdomen reveals "Mid to proximal small bowel loops are dilated, with marked distention of the stomach as well. Contrast noted in the distal esophagus, likely related to reflux. Distal small bowel loops are decompressed, with transition point noted at the lower midline retroperitoneum, at the site of prior surgery".     was contacted who will admit the patient. [facility] was consulted for medical management. Patient just had NG tube placed.    In the ED today, patient had coffee-ground emesis in the ED.  was consulted who recommend supportive care at this time.      Hospital Course:  8/12/17 No acute issues overnight. Pt reports pain is improved. Continue NG decompression. 8/13/17 Pt reports that he had " a really good bowel movement this morning". Pt reports improvement in symptoms. Continue NG compression. No current issues or complaints. ABD xray this morning showed increasing distention compared to prior exam. 8/14/17 The patients NG tube inadvertently came out overnight. Ok per primary team to leave it out. Pts last BM was yesterday, " pt is passing flatus. No current complaints.     Interval History: The patients NG tube inadvertently came out overnight. Ok per primary team to leave it out. Pts last BM was yesterday, pt is passing flatus. No current complaints.       Review of Systems   Constitutional: Negative.  Negative for activity change, appetite change, chills, fatigue, fever and unexpected weight change.   HENT: Negative.  Negative for congestion, facial swelling, nosebleeds, rhinorrhea, sinus pressure, sneezing and sore throat.    Eyes: Negative.  Negative for photophobia, discharge, redness and visual disturbance.   Respiratory: Negative.  Negative for apnea, cough, chest tightness, shortness of breath, wheezing and stridor.    Cardiovascular: Negative.  Negative for chest pain, palpitations and leg swelling.   Gastrointestinal: Positive for abdominal pain (discomfort), nausea and vomiting. Negative for abdominal distention, anal bleeding, blood in stool, constipation and diarrhea.   Endocrine: Negative.  Negative for polydipsia, polyphagia and polyuria.   Genitourinary: Negative.  Negative for difficulty urinating, discharge, dysuria, flank pain, frequency, hematuria and urgency.   Musculoskeletal: Negative.  Negative for arthralgias, back pain, gait problem, joint swelling, myalgias, neck pain and neck stiffness.   Skin: Negative.  Negative for color change, pallor and rash.   Allergic/Immunologic: Negative.  Negative for environmental allergies, food allergies and immunocompromised state.   Neurological: Negative.  Negative for dizziness, tremors, seizures, syncope, facial asymmetry, speech difficulty, weakness, light-headedness, numbness and headaches.   Hematological: Negative.    Psychiatric/Behavioral: Negative.  Negative for behavioral problems, confusion, hallucinations and suicidal ideas. The patient is not nervous/anxious.    All other systems reviewed and are negative.    Objective:     Vital Signs (Most Recent):  Temp: 98.2  °F (36.8 °C) (08/14/17 1136)  Pulse: 72 (08/14/17 1136)  Resp: 18 (08/14/17 1136)  BP: (!) 166/77 (08/14/17 1136)  SpO2: 95 % (08/14/17 1136) Vital Signs (24h Range):  Temp:  [97.5 °F (36.4 °C)-98.7 °F (37.1 °C)] 98.2 °F (36.8 °C)  Pulse:  [67-89] 72  Resp:  [18-20] 18  SpO2:  [91 %-98 %] 95 %  BP: (125-166)/(67-79) 166/77     Weight: 90.7 kg (200 lb)  Body mass index is 26.39 kg/m².    Intake/Output Summary (Last 24 hours) at 08/14/17 1211  Last data filed at 08/14/17 1000   Gross per 24 hour   Intake          2768.74 ml   Output             2600 ml   Net           168.74 ml      Physical Exam   Constitutional: He is oriented to person, place, and time. He appears well-developed and well-nourished. No distress.   Appears uncomfortable.   HENT:   Head: Normocephalic and atraumatic.   Eyes: Conjunctivae and EOM are normal. Pupils are equal, round, and reactive to light. No scleral icterus.   Neck: Normal range of motion. Neck supple. No thyromegaly present.   Cardiovascular: Normal rate, regular rhythm, normal heart sounds and intact distal pulses.    No murmur heard.  Pulmonary/Chest: Effort normal and breath sounds normal. No respiratory distress. He has no wheezes. He exhibits no tenderness.   Abdominal: Soft. He exhibits no distension. Bowel sounds are decreased. There is no tenderness.   Musculoskeletal: Normal range of motion. He exhibits no edema, tenderness or deformity.   Right BKA, left foot amputation   Lymphadenopathy:     He has no cervical adenopathy.   Neurological: He is alert and oriented to person, place, and time. No cranial nerve deficit. He exhibits normal muscle tone. Coordination normal.   Skin: Skin is warm and dry. No rash noted. He is not diaphoretic. No erythema.   Psychiatric: He has a normal mood and affect. His behavior is normal. Judgment and thought content normal.   Nursing note and vitals reviewed.      Significant Labs: All pertinent labs within the past 24 hours have been  reviewed.    Significant Imaging:   Imaging Results          X-Ray Abdomen Flat And Erect (Final result)  Result time 08/13/17 09:56:39    Final result by Freddie Nelson MD (08/13/17 09:56:39)                 Impression:     Small bowel obstruction.  Increasing distention compared to prior exam.      Electronically signed by: FREDDIE NELSON MD  Date:     08/13/17  Time:    09:56              Narrative:    PROCEDURE: XR ABDOMEN FLAT AND ERECT, 08/13/17 08:43:02    HISTORY:  small bowel obstruction    COMPARISON STUDIES: August 11, 2017    FINDINGS:   Diffusely dilated small bowel loops throughout the abdomen with maximum diameter of approximately 4.4 cm.  Scattered surgical clips.  Small amounts of air in the colon with some contrast in the lower left colon.  Nasogastric tube present.  left sided nephroureteral stent.                             X-Ray Chest 1 View (Final result)  Result time 08/11/17 19:56:15    Final result by Stefan Gagnon MD (08/11/17 19:56:15)                 Impression:     Tip of the NG tube not visualized. See above.      Electronically signed by: STEFAN GAGNON MD  Date:     08/11/17  Time:    19:56              Narrative:    Exam: Chest X-ray, one view.    History: Encounter for fitting and adjustment of other gastrointestinal appliance and device    Findings: Comparison is made to prior examination at 1536 hrs. NG tube has been placed, extending into the distal esophagus, tip not visualized. Consider AP view of the abdomen for further assessment.                             CT Abdomen Pelvis  Without Contrast (Final result)  Result time 08/11/17 18:38:49   Procedure changed from CT Abdomen Pelvis With Contrast     Final result by Stefan Gagnon MD (08/11/17 18:38:49)                 Impression:     Mid small bowel obstruction, likely related to postoperative adhesions. See above. No free air or pneumatosis identified.      Electronically signed by: STEFAN GAGNON MD  Date:      08/11/17  Time:    18:38              Narrative:    Examination: CT of the abdomen and pelvis without contrast.    History: Vomiting    Findings: Comparison is made to prior examination dated 03/20/2000 1534 she kidney again noted. Left ureteral stent remains in place. Postoperative changes again noted in the midline retroperitoneum.    Mid to proximal small bowel loops are dilated, with marked distention of the stomach as well. Contrast noted in the distal esophagus, likely related to reflux. Distal small bowel loops are decompressed, with transition point noted at the lower midline retroperitoneum, at the site of prior surgery. No free air or pneumatosis, and otherwise no significant change from prior exam.                             X-Ray Abdomen Flat And Erect (Final result)  Result time 08/11/17 15:53:16    Final result by Hill Mario MD (08/11/17 15:53:16)                 Impression:      Bowel gas pattern may be due to an ileus.  Small bowel obstruction not excluded.    Other findings as described above.      Electronically signed by: HILL MARIO MD  Date:     08/11/17  Time:    15:53              Narrative:    EXAM:  2 view abdomen, flat and erect    CLINICAL HISTORY: abdominal pain, unspecified without report of trauma    COMPARISON STUDIES: Abdominal series 04/22/2016    FINDINGS:    Abdomen: No definite free air is seen. Several surgical clips within the midabdomen.  Left ureteral stent.  atherosclerosis.    There are several air-fluid levels within the abdomen.  There are dilated small bowel loops..                             X-Ray Chest PA And Lateral (Final result)  Result time 08/11/17 15:51:11    Final result by Hill Mario MD (08/11/17 15:51:11)                 Impression:      No acute cardiopulmonary findings.  Please see above      Electronically signed by: HILL MARIO MD  Date:     08/11/17  Time:    15:51              Narrative:    EXAM: Chest X-ray PA and  Lateral    CLINICAL HISTORY:     Vomiting, unspecified    COMPARISON STUDIES:     04/24/2017    FINDINGS:    Surgical clips in the right axilla.  There are surgical staples in the right lung.  Chronic blunting of the right costophrenic sulcus.  Left lung appears clear.  Normal heart size.. Chronic right rib deformities probably from previous thoracotomy.  Coronary artery stent.  Chronic compression of one of the midthoracic vertebral bodies.  Surgical staples and a ureteral stent is noted on the lateral view..                                Assessment/Plan:      * Small bowel obstruction due to adhesions    - General Surgery primary  - NGT inadvertently removed last night.  - Supportive care  - IV fluids  - Pt reports large BM yesterday, passing flatus        Coffee ground emesis    - Likely due to persistent vomiting.  - GI consulted, Dr. Tanner aware  - Protonix IV daily for now.  - monitor H/H        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    - H/o Right BKA          CAD;Old MI ; s/p stents x 3 (2000)     - Hold anti-platelet agents  - Denies chest pain or SOB at this time.          CKD (chronic kidney disease) stage 3, GFR 30-59 ml/min    - Serum creatinine at baseline  - Gentle IV hydration  - Monitor, labs in AM          Essential hypertension    - Hydralazine IV prn  - Hold oral BP medications while NGT in place  - Monitor and adjust as needed            VTE Risk Mitigation         Ordered     Medium Risk of VTE  Once      08/11/17 2210     Place sequential compression device  Until discontinued      08/11/17 2210     Reason for No Pharmacological VTE Prophylaxis  Once      08/11/17 2210     Place sequential compression device  Until discontinued      08/11/17 2210              Brennen Weiner NP  Department of Hospital Medicine   [facility]

## 2017-08-15 PROBLEM — I63.412 CEREBRAL INFARCTION DUE TO EMBOLISM OF LEFT MIDDLE CEREBRAL ARTERY: Status: ACTIVE | Noted: 2017-08-15

## 2017-08-15 PROBLEM — E87.6 HYPOKALEMIA: Status: ACTIVE | Noted: 2017-08-15

## 2017-08-15 PROBLEM — R20.0 RIGHT UPPER EXTREMITY NUMBNESS: Status: ACTIVE | Noted: 2017-08-15

## 2017-08-15 LAB
ANION GAP SERPL CALC-SCNC: 12 MMOL/L
BUN SERPL-MCNC: 11 MG/DL
CALCIUM SERPL-MCNC: 8.9 MG/DL
CHLORIDE SERPL-SCNC: 101 MMOL/L
CO2 SERPL-SCNC: 25 MMOL/L
CREAT SERPL-MCNC: 1 MG/DL
EST. GFR  (AFRICAN AMERICAN): >60 ML/MIN/1.73 M^2
EST. GFR  (NON AFRICAN AMERICAN): >60 ML/MIN/1.73 M^2
GLUCOSE SERPL-MCNC: 97 MG/DL
POTASSIUM SERPL-SCNC: 3.1 MMOL/L
SODIUM SERPL-SCNC: 138 MMOL/L

## 2017-08-15 PROCEDURE — 36415 COLL VENOUS BLD VENIPUNCTURE: CPT

## 2017-08-15 PROCEDURE — 25000003 PHARM REV CODE 250: Performed by: NURSE PRACTITIONER

## 2017-08-15 PROCEDURE — 97535 SELF CARE MNGMENT TRAINING: CPT

## 2017-08-15 PROCEDURE — 97167 OT EVAL HIGH COMPLEX 60 MIN: CPT

## 2017-08-15 PROCEDURE — G8987 SELF CARE CURRENT STATUS: HCPCS | Mod: CL

## 2017-08-15 PROCEDURE — 25000003 PHARM REV CODE 250: Performed by: INTERNAL MEDICINE

## 2017-08-15 PROCEDURE — G8978 MOBILITY CURRENT STATUS: HCPCS | Mod: CL

## 2017-08-15 PROCEDURE — G8988 SELF CARE GOAL STATUS: HCPCS | Mod: CJ

## 2017-08-15 PROCEDURE — 63600175 PHARM REV CODE 636 W HCPCS: Performed by: INTERNAL MEDICINE

## 2017-08-15 PROCEDURE — 80061 LIPID PANEL: CPT

## 2017-08-15 PROCEDURE — 63600175 PHARM REV CODE 636 W HCPCS: Performed by: EMERGENCY MEDICINE

## 2017-08-15 PROCEDURE — 97530 THERAPEUTIC ACTIVITIES: CPT

## 2017-08-15 PROCEDURE — 80048 BASIC METABOLIC PNL TOTAL CA: CPT

## 2017-08-15 PROCEDURE — 25000003 PHARM REV CODE 250: Performed by: SURGERY

## 2017-08-15 PROCEDURE — 99223 1ST HOSP IP/OBS HIGH 75: CPT | Mod: ,,, | Performed by: PSYCHIATRY & NEUROLOGY

## 2017-08-15 PROCEDURE — 63600175 PHARM REV CODE 636 W HCPCS: Performed by: SURGERY

## 2017-08-15 PROCEDURE — 21400001 HC TELEMETRY ROOM

## 2017-08-15 PROCEDURE — 97162 PT EVAL MOD COMPLEX 30 MIN: CPT

## 2017-08-15 PROCEDURE — C9113 INJ PANTOPRAZOLE SODIUM, VIA: HCPCS | Performed by: EMERGENCY MEDICINE

## 2017-08-15 PROCEDURE — G8979 MOBILITY GOAL STATUS: HCPCS | Mod: CK

## 2017-08-15 PROCEDURE — 99231 SBSQ HOSP IP/OBS SF/LOW 25: CPT | Mod: ,,, | Performed by: SURGERY

## 2017-08-15 RX ORDER — AMLODIPINE BESYLATE 10 MG/1
10 TABLET ORAL DAILY
Status: DISCONTINUED | OUTPATIENT
Start: 2017-08-15 | End: 2017-08-15

## 2017-08-15 RX ORDER — CLOPIDOGREL BISULFATE 75 MG/1
75 TABLET ORAL DAILY
Status: DISCONTINUED | OUTPATIENT
Start: 2017-08-15 | End: 2017-08-18

## 2017-08-15 RX ORDER — POTASSIUM CHLORIDE 20 MEQ/15ML
40 SOLUTION ORAL ONCE
Status: COMPLETED | OUTPATIENT
Start: 2017-08-15 | End: 2017-08-15

## 2017-08-15 RX ORDER — LOSARTAN POTASSIUM 50 MG/1
100 TABLET ORAL DAILY
Status: DISCONTINUED | OUTPATIENT
Start: 2017-08-15 | End: 2017-08-21 | Stop reason: HOSPADM

## 2017-08-15 RX ORDER — CARVEDILOL 6.25 MG/1
6.25 TABLET ORAL 2 TIMES DAILY WITH MEALS
Status: DISCONTINUED | OUTPATIENT
Start: 2017-08-15 | End: 2017-08-21 | Stop reason: HOSPADM

## 2017-08-15 RX ORDER — PRAVASTATIN SODIUM 20 MG/1
80 TABLET ORAL NIGHTLY
Status: DISCONTINUED | OUTPATIENT
Start: 2017-08-15 | End: 2017-08-21 | Stop reason: HOSPADM

## 2017-08-15 RX ADMIN — AMLODIPINE BESYLATE 10 MG: 10 TABLET ORAL at 08:08

## 2017-08-15 RX ADMIN — PRAVASTATIN SODIUM 80 MG: 20 TABLET ORAL at 08:08

## 2017-08-15 RX ADMIN — POTASSIUM CHLORIDE 40 MEQ: 20 SOLUTION ORAL at 12:08

## 2017-08-15 RX ADMIN — PANTOPRAZOLE SODIUM 40 MG: 40 INJECTION, POWDER, FOR SOLUTION INTRAVENOUS at 08:08

## 2017-08-15 RX ADMIN — HYDRALAZINE HYDROCHLORIDE 10 MG: 20 INJECTION INTRAMUSCULAR; INTRAVENOUS at 08:08

## 2017-08-15 RX ADMIN — LOSARTAN POTASSIUM 100 MG: 50 TABLET, FILM COATED ORAL at 12:08

## 2017-08-15 RX ADMIN — HYDROMORPHONE HYDROCHLORIDE 1 MG: 1 INJECTION, SOLUTION INTRAMUSCULAR; INTRAVENOUS; SUBCUTANEOUS at 03:08

## 2017-08-15 RX ADMIN — CARVEDILOL 6.25 MG: 6.25 TABLET, FILM COATED ORAL at 05:08

## 2017-08-15 RX ADMIN — CLOPIDOGREL BISULFATE 75 MG: 75 TABLET ORAL at 12:08

## 2017-08-15 RX ADMIN — SODIUM CHLORIDE, SODIUM LACTATE, POTASSIUM CHLORIDE, AND CALCIUM CHLORIDE: 600; 310; 30; 20 INJECTION, SOLUTION INTRAVENOUS at 07:08

## 2017-08-15 NOTE — PLAN OF CARE
08/15/17 0932   Medicare Message   Important Message from Medicare regarding Discharge Appeal Rights Given to patient/caregiver;Explained to patient/caregiver;Signed/date by patient/caregiver   Date IMM was signed 08/15/17   Time IMM was signed [id number]

## 2017-08-15 NOTE — CONSULTS
"[facility]   Neurology  Consult Note    Patient Name: Kodi Ribeiro  MRN: [id number]  Admission Date: 8/11/2017  Hospital Length of Stay: 4 days  Code Status: Full Code   Attending Provider: Ta Cook MD   Consulting Provider: Desmond Davidson MD  Primary Care Physician: Norma Nuñez MD  Principal Problem:Small bowel obstruction due to adhesions    Consults  Subjective:     Chief Complaint:  Weak and awkward right arm and hand     HPI: The patient states that while he was having repeated emesis and retching, he became aware of a "funny feeling" in the right hand and arm, indicating that he could not control the movement well of the arm and hand.  He first noted the difficulty while at home, prior to admission to the hospital for his apparent bowel obstruction.  He did not notice any facial weakness, speech difficulty or weakness of the right leg.  He denies any change in vision such as vision loss or diplopia.    As his emesis began to resolve, he became much more aware of the right hand weakness and awkwardness of movements.  He has now had CT scan brain done which is abnormal.  MRI was ordered, but the patient is severely claustrophobic and states that he cannot do MRI unless totally sedated.    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    Late complications of amputation stump     rseolved with further amputation( MRSA then none since 2014)    Lipoma of colon     colonoscopy 2/21/2014    Myocardial infarction     per patient 2000 & 9/2012    Peripheral vascular disease     Phantom limb syndrome     patient reports only intermittent " not problematic, not worsening    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    Spinal cord disease     L4L5 disc    Tobacco dependence     resolved    Ureteral stent retained        Past Surgical History:   Procedure Laterality Date    ABDOMINAL AORTIC ANEURYSM REPAIR      ABDOMINAL AORTIC ANEURYSM REPAIR  1996/2014    AMPUTATION      AORTA - BILATERAL FEMORAL ARTERY BYPASS GRAFT  2014    Left and right leg    CORONARY ANGIOPLASTY WITH STENT PLACEMENT  2000    Three placed in heart    FOOT AMPUTATION THROUGH METATARSAL  1996    left    FOOT SURGERY Bilateral 1980's    per patient multiple toe amputations prior to.  partial foot amputation:first great toe then other toes     KIDNEY SURGERY  2014    per patient separation of horseshoe kidney @ time of AAA repair    LUNG LOBECTOMY Right 1970s    per patient not cancer    right below knee amputation  2009 (approx)    SMALL INTESTINE SURGERY  2014    per patient partial @ time of aaa repair  not small bowel - large bowel bowel compromised bythtwe AAAbowel    TONSILLECTOMY  1955 aprox    URETERAL STENT PLACEMENT Left     annually replaced since 2012 or so  Dr Jin       Review of patient's allergies indicates:   Allergen Reactions    Morphine Itching       Current Neurological Medications: None    No current facility-administered medications on file prior to encounter.      Current Outpatient Prescriptions on File Prior to Encounter   Medication Sig    amlodipine (NORVASC) 10 MG tablet TAKE 1 TABLET ONE TIME DAILY    carvedilol (COREG) 6.25 MG tablet Take 1 tablet (6.25 mg total) by mouth 2 (two) times daily.    clopidogrel (PLAVIX) 75 mg tablet TAKE 1 TABLET ONE TIME DAILY    docusate sodium (COLACE) 100 MG capsule Take 1 capsule (100 mg total) by mouth 2 (two) times daily.    losartan (COZAAR) 100 MG tablet Take 1 tablet (100 mg total) by mouth once daily.    omeprazole (PRILOSEC) 20 MG capsule Take 1 capsule (20 mg total) by mouth 2 (two)  times daily.    oxycodone-acetaminophen (PERCOCET) 5-325 mg per tablet Take 1-2 tablets by mouth every 4 (four) hours as needed for Pain.    pravastatin (PRAVACHOL) 80 MG tablet Take 1 tablet (80 mg total) by mouth every evening.    triamcinolone acetonide 0.025% (KENALOG) 0.025 % cream Apply topically 2 (two) times daily.      Family History     Problem Relation (Age of Onset)    COPD Mother    Cancer Mother    Diabetes Daughter    Heart disease Father        Social History Main Topics    Smoking status: Former Smoker     Packs/day: 1.00     Years: 15.00     Quit date: 1/1/2009    Smokeless tobacco: Never Used    Alcohol use No    Drug use: No      Comment: Is on prescription opiod, no non prescribed use    Sexual activity: Not Currently     Partners: Female     Review of Systems     ROS:  GENERAL: No fever, chills, fatigability or weight loss.  SKIN: No rashes, itching or changes in color or texture of skin.  HEAD: See comments in present illness.  Denies recent head trauma.  EYES: Visual acuity fine. No photophobia, ocular pain or diplopia.  EARS: Denies ear pain, discharge or vertigo.  NOSE: No loss of smell, no epistaxis or postnasal drip.  MOUTH & THROAT: No hoarseness or change in voice. No excessive gum bleeding.  NODES: Denies swollen glands.  CHEST: Denies MACKEY, cyanosis, wheezing, cough and sputum production.  CARDIOVASCULAR: Denies chest pain, PND, orthopnea or reduced exercise tolerance.  ABDOMEN: Denies nausea or pain today.  Has been taking clear liquids without emesis.  URINARY: No flank pain, dysuria or hematuria.  PERIPHERAL VASCULAR: Prior right BK amputation for vascular insufficiency/infection  MUSCULOSKELETAL: See above      Objective:     Vital Signs (Most Recent):  Temp: 98.5 °F (36.9 °C) (08/15/17 1606)  Pulse: 100 (08/15/17 1606)  Resp: 18 (08/15/17 1606)  BP: (!) 149/70 (08/15/17 1606)  SpO2: 97 % (08/15/17 1606) Vital Signs (24h Range):  Temp:  [98.5 °F (36.9 °C)-98.9 °F (37.2 °C)]  98.5 °F (36.9 °C)  Pulse:  [] 100  Resp:  [18-22] 18  SpO2:  [94 %-98 %] 97 %  BP: (149-185)/(70-86) 149/70     Weight: 90.7 kg (200 lb)  Body mass index is 26.39 kg/m².    Physical Exam   APPEARANCE: Well nourished, well developed, in no acute distress.    HEAD: Normocephalic, atraumatic.  EYES: PERRL. EOMI.  Non-icteric sclerae.    EARS: TM's intact. Light reflex normal. No retraction or perforation.    NOSE: Mucosa pink. Airway clear.  MOUTH & THROAT: No tonsillar enlargement.    NECK: Supple. No bruits.  CHEST: Lungs clear to auscultation.  CARDIOVASCULAR: Regular rhythm without significant murmurs.  MUSCULOSKELETAL: Right BK amputation.  ABDOMEN:  Slightly distended.  Decrease BS.  NEUROLOGIC:   Mental Status:  The patient is well oriented to person, time, place, and situation.  The patient is attentive to the environment and cooperative for the exam.  Cranial Nerves: II-XII grossly intact. Fundoscopic exam is normal.  No hemorrhage, exudate or papilledema is present. The extraocular muscles are intact in the cardinal directions of gaze.  No ptosis is present. Facial features are symmetrical.  Speech is normal in fluency, diction, and phrasing.  Tongue protrudes in the midline.    Gait and Station:  He is confined to bed.  Motor:  The patient could not bring the right arm to shoulder level.  /wrist extension are 3/5 on the right.  Deltoid, biceps and triceps are 4/5 on the right and 5/5 on the left.  Right hip flexion is 5/5.  Sensory:  Intact both upper and lower extremities to pin prick, touch, and vibration.  Cerebellar:  Patient is unable to oppose the right thumb to the other fingers.  He has poor finger tapping on the right.  Right hand and arm are extremely awkward.  Reflexes:  Stretch reflexes are 2+ both biceps/triceps.  Plantar stimulation is flexor on the left.             Significant Labs:   BMP:   Recent Labs  Lab 08/15/17  0843   GLU 97      K 3.1*      CO2 25   BUN 11    CREATININE 1.0   CALCIUM 8.9     CBC:   Recent Labs  Lab 08/14/17  0531   WBC 4.95   HGB 10.5*   HCT 31.6*   *     CMP:   Recent Labs  Lab 08/15/17  0843   GLU 97      K 3.1*      CO2 25   BUN 11   CREATININE 1.0   CALCIUM 8.9   ANIONGAP 12   EGFRNONAA >60     All pertinent lab results from the past 24 hours have been reviewed.    Significant Imaging: I have reviewed and interpreted all pertinent imaging results/findings within the past 24 hours.  CT scan brain shows decreased attenuation, left medial temporal/parietal lobe without mass effect or hemorrhage.    Assessment and Plan:     1.  Stroke, left MCA branches    RECOMMENDATIONS:  1.  Repeat CT scan with contrast, unless the hospital can do MRI with conscious sedation.  Prefer the MRI, but CT with contrast would be helpful.  2.  Needs TTE, carotid doppler, CTA of intracranial vessels.    Active Diagnoses:    Diagnosis Date Noted POA    PRINCIPAL PROBLEM:  Small bowel obstruction due to adhesions [K56.5] 08/11/2017 Yes    Hypokalemia [E87.6] 08/15/2017 Unknown    Right upper extremity numbness [R20.2] 08/15/2017 Unknown    Coffee ground emesis [K92.0] 08/11/2017 Yes    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 [Z89.511] 07/28/2016 Not Applicable     Chronic    CAD;Old MI ; s/p stents x 3 (2000)  [I25.2] 06/22/2015 Not Applicable     Chronic    CKD (chronic kidney disease) stage 3, GFR 30-59 ml/min [N18.3] 09/04/2014 Yes     Chronic    Essential hypertension [I10] 06/18/2013 Yes     Chronic      Problems Resolved During this Admission:    Diagnosis Date Noted Date Resolved POA    Ulcer of ileum [K63.3]  08/12/2017 Yes       VTE Risk Mitigation         Ordered     Medium Risk of VTE  Once      08/11/17 2210     Place sequential compression device  Until discontinued      08/11/17 2210     Reason for No Pharmacological VTE Prophylaxis  Once      08/11/17 2210     Place sequential compression device  Until discontinued       08/11/17 5403          Thank you for your consult. I will follow-up with patient. Please contact us if you have any additional questions.    Desmond Davidson MD  Neurology  [facility]

## 2017-08-15 NOTE — PROGRESS NOTES
"[facility] Hospital Medicine  Progress Note    Patient Name: Kodi Ribeiro  MRN: [id number]  Patient Class: IP- Inpatient   Admission Date: 8/11/2017  Length of Stay: 4 days  Attending Physician: Ta Cook MD  Primary Care Provider: Norma Nuñez MD        Subjective:     Principal Problem:Small bowel obstruction due to adhesions    HPI:  Mr. Ribeiro is a 66 y/o  male with h/o CAD, PAD, cardiac stents, CKD3, HTN, right BKA, AAA repair complicated by SBO, requiring surgery, left ureteral stent placement, presents to the ED c/o abdominal discomfort, nausea and vomiting since yesterday morning 1 AM. He thought it was "stomach bug" and would pass, but continued to get worse, hence presented to the ED. Last BM 3 days ago (wednesday).    X-Ray abdomen shows "several air-fluid levels within the abdomen.  There are dilated small bowel loops."    CT abdomen reveals "Mid to proximal small bowel loops are dilated, with marked distention of the stomach as well. Contrast noted in the distal esophagus, likely related to reflux. Distal small bowel loops are decompressed, with transition point noted at the lower midline retroperitoneum, at the site of prior surgery".     was contacted who will admit the patient. [facility] was consulted for medical management. Patient just had NG tube placed.    In the ED today, patient had coffee-ground emesis in the ED.  was consulted who recommend supportive care at this time.      Hospital Course:  8/12/17 No acute issues overnight. Pt reports pain is improved. Continue NG decompression. 8/13/17 Pt reports that he had " a really good bowel movement this morning". Pt reports improvement in symptoms. Continue NG compression. No current issues or complaints. ABD xray this morning showed increasing distention compared to prior exam. 8/14/17 The patients NG tube inadvertently came out overnight. Ok per primary team to leave it out. Pts last BM was yesterday, " pt is passing flatus. No current complaints. 8/15/17 The patient began complaining of RUE numbness yesterday. On exam strength was equal bilaterally. Head CT showed a vague 1 cm area of hypodensity in the white matter of the left centrum semiovale. Will obtain an MRI of the brain today and consult Neurology.     Interval History: The patient began complaining of RUE numbness yesterday. On exam strength was equal bilaterally. Head CT showed a vague 1 cm area of hypodensity in the white matter of the left centrum semiovale. Will obtain an MRI of the brain today and consult Neurology.     Review of Systems   Constitutional: Negative.  Negative for activity change, appetite change, chills, fatigue, fever and unexpected weight change.   HENT: Negative.  Negative for congestion, facial swelling, nosebleeds, rhinorrhea, sinus pressure, sneezing and sore throat.    Eyes: Negative.  Negative for photophobia, discharge, redness and visual disturbance.   Respiratory: Negative.  Negative for apnea, cough, chest tightness, shortness of breath, wheezing and stridor.    Cardiovascular: Negative.  Negative for chest pain, palpitations and leg swelling.   Gastrointestinal: Positive for abdominal pain (discomfort), nausea and vomiting. Negative for abdominal distention, anal bleeding, blood in stool, constipation and diarrhea.   Endocrine: Negative.  Negative for polydipsia, polyphagia and polyuria.   Genitourinary: Negative.  Negative for difficulty urinating, discharge, dysuria, flank pain, frequency, hematuria and urgency.   Musculoskeletal: Negative.  Negative for arthralgias, back pain, gait problem, joint swelling, myalgias, neck pain and neck stiffness.   Skin: Negative.  Negative for color change, pallor and rash.   Allergic/Immunologic: Negative.  Negative for environmental allergies, food allergies and immunocompromised state.   Neurological: Positive for numbness. Negative for dizziness, tremors, seizures, syncope, facial  asymmetry, speech difficulty, weakness, light-headedness and headaches.        LUE numbness   Hematological: Negative.    Psychiatric/Behavioral: Negative.  Negative for behavioral problems, confusion, hallucinations and suicidal ideas. The patient is not nervous/anxious.    All other systems reviewed and are negative.    Objective:     Vital Signs (Most Recent):  Temp: 98.5 °F (36.9 °C) (08/15/17 0736)  Pulse: 104 (08/15/17 0932)  Resp: (!) 22 (08/15/17 0932)  BP: (!) 158/74 (08/15/17 0932)  SpO2: 98 % (08/15/17 0932) Vital Signs (24h Range):  Temp:  [98.5 °F (36.9 °C)-98.9 °F (37.2 °C)] 98.5 °F (36.9 °C)  Pulse:  [] 104  Resp:  [18-22] 22  SpO2:  [94 %-98 %] 98 %  BP: (158-185)/(74-86) 158/74     Weight: 90.7 kg (200 lb)  Body mass index is 26.39 kg/m².    Intake/Output Summary (Last 24 hours) at 08/15/17 1155  Last data filed at 08/15/17 0810   Gross per 24 hour   Intake          3586.25 ml   Output             1300 ml   Net          2286.25 ml      Physical Exam   Constitutional: He is oriented to person, place, and time. He appears well-developed and well-nourished. No distress.   Appears uncomfortable.   HENT:   Head: Normocephalic and atraumatic.   Eyes: Conjunctivae and EOM are normal. Pupils are equal, round, and reactive to light. No scleral icterus.   Neck: Normal range of motion. Neck supple. No thyromegaly present.   Cardiovascular: Normal rate, regular rhythm, normal heart sounds and intact distal pulses.    No murmur heard.  Pulmonary/Chest: Effort normal and breath sounds normal. No respiratory distress. He has no wheezes. He exhibits no tenderness.   Abdominal: Soft. He exhibits no distension. Bowel sounds are decreased. There is no tenderness.   Musculoskeletal: Normal range of motion. He exhibits no edema, tenderness or deformity.   Right BKA, left foot amputation   Lymphadenopathy:     He has no cervical adenopathy.   Neurological: He is alert and oriented to person, place, and time. No  cranial nerve deficit. He exhibits normal muscle tone. Coordination normal.   Patient is alert and oriented to person, place and time. Pupils ERRL and EOM normal. No cranial nerve deficit and cranial nerves II-XII are intact. Strength is full bilaterally; it is equal and 5/5 in bilateral upper and lower extremities. There is no pronator drift of outstretched arms. Light touch sense is intact. Speech is clear and normal. DTRs are normal and symmetric; they are 2+ and equal bilaterally. No acute focal neurological deficits noted.      Skin: Skin is warm and dry. No rash noted. He is not diaphoretic. No erythema.   Psychiatric: He has a normal mood and affect. His behavior is normal. Judgment and thought content normal.   Nursing note and vitals reviewed.      Significant Labs: All pertinent labs within the past 24 hours have been reviewed.    Significant Imaging:   Imaging Results          X-Ray Abdomen Flat And Erect (Final result)  Result time 08/15/17 11:37:29    Final result by RADHA Medellin Sr., MD (08/15/17 11:37:29)                 Impression:      1. There are dilated loops of small bowel. In the expected location of the mid to distal portion of the ileum there is a dilated loop of small bowel with a diameter of 4.1 cm. On the prior examination it measured 4.4 cm. This is consistent with the patient's history of a small bowel obstruction.  2. There is a suspected compression fracture of the L1 vertebral body.  3. Surgical changes      Electronically signed by: RADHA MEDELLIN MD  Date:     08/15/17  Time:    11:37              Narrative:    Flat and erect KUB    History: small bowel obstruction; resolving    Finding: Comparison was made to a prior examination performed on 8/13/2017. There are dilated loops of small bowel. In the expected location of the mid to distal portion of the ileum there is a dilated loop of small bowel with a diameter of 4.1 cm. On the prior examination it measured 4.4 cm. There is no  pneumoperitoneum. There is a suspected compression fracture of the L1 vertebral body. There are surgical clips projected over the abdomen and pelvis. There is a left ureteral catheter again visualized. There is partial visualization of a vascular stent in the expected location of the left superficial femoral artery.                             CT Head Without Contrast (Final result)  Result time 08/14/17 18:12:27    Final result by Dwayne Nowak Jr., MD (08/14/17 18:12:27)                 Impression:     Small subtle zone of hypodensity in the left centrum semiovale white matter, with possibilities as above.  No acute hemorrhage is noted.      All CT scans at this facility use dose modulation, iterative reconstruction, and/or weight based dosing when appropriate to reduce radiation dose to as low as reasonably achievable.        Electronically signed by: DWAYNE NOWAK MD  Date:     08/14/17  Time:    18:12              Narrative:    Exam: CT HEAD WITHOUT CONTRAST    History:  Right upper extremity numbness/weakness      Technique: Noncontrast axial images of the head were obtained.  Motion artifact.  A satisfactory exam was acquired.    Comparison:None    Findings: Vague 1 cm area of hypodensity in the white matter of the left centrum semiovale, image 20 series 5.  Possibilities include an area of microvascular ischemic change or subacute lacunar ischemic infarct.  Ventricular system is normal.  No hydrocephalus.  No midline shift.  No abnormal density to indicate acute major vascular distribution ischemic infarction or hemorrhage.  No mass effect.  No extra-axial fluid collections.     Visualized paranasal sinuses and mastoid air cells appear clear.      No calvarial fracture.                             X-Ray Abdomen Flat And Erect (Final result)  Result time 08/13/17 09:56:39    Final result by Lavell Nelson MD (08/13/17 09:56:39)                 Impression:     Small bowel obstruction.  Increasing  distention compared to prior exam.      Electronically signed by: FREDDIE ALFORD MD  Date:     08/13/17  Time:    09:56              Narrative:    PROCEDURE: XR ABDOMEN FLAT AND ERECT, 08/13/17 08:43:02    HISTORY:  small bowel obstruction    COMPARISON STUDIES: August 11, 2017    FINDINGS:   Diffusely dilated small bowel loops throughout the abdomen with maximum diameter of approximately 4.4 cm.  Scattered surgical clips.  Small amounts of air in the colon with some contrast in the lower left colon.  Nasogastric tube present.  left sided nephroureteral stent.                             X-Ray Chest 1 View (Final result)  Result time 08/11/17 19:56:15    Final result by Stefan Gagnon MD (08/11/17 19:56:15)                 Impression:     Tip of the NG tube not visualized. See above.      Electronically signed by: STEFAN GAGNON MD  Date:     08/11/17  Time:    19:56              Narrative:    Exam: Chest X-ray, one view.    History: Encounter for fitting and adjustment of other gastrointestinal appliance and device    Findings: Comparison is made to prior examination at 1536 hrs. NG tube has been placed, extending into the distal esophagus, tip not visualized. Consider AP view of the abdomen for further assessment.                             CT Abdomen Pelvis  Without Contrast (Final result)  Result time 08/11/17 18:38:49   Procedure changed from CT Abdomen Pelvis With Contrast     Final result by Stefan Gagnon MD (08/11/17 18:38:49)                 Impression:     Mid small bowel obstruction, likely related to postoperative adhesions. See above. No free air or pneumatosis identified.      Electronically signed by: STEFAN GAGNON MD  Date:     08/11/17  Time:    18:38              Narrative:    Examination: CT of the abdomen and pelvis without contrast.    History: Vomiting    Findings: Comparison is made to prior examination dated 03/20/2000 1534 she kidney again noted. Left ureteral stent remains in place.  Postoperative changes again noted in the midline retroperitoneum.    Mid to proximal small bowel loops are dilated, with marked distention of the stomach as well. Contrast noted in the distal esophagus, likely related to reflux. Distal small bowel loops are decompressed, with transition point noted at the lower midline retroperitoneum, at the site of prior surgery. No free air or pneumatosis, and otherwise no significant change from prior exam.                             X-Ray Abdomen Flat And Erect (Final result)  Result time 08/11/17 15:53:16    Final result by Hill Mario MD (08/11/17 15:53:16)                 Impression:      Bowel gas pattern may be due to an ileus.  Small bowel obstruction not excluded.    Other findings as described above.      Electronically signed by: HILL MARIO MD  Date:     08/11/17  Time:    15:53              Narrative:    EXAM:  2 view abdomen, flat and erect    CLINICAL HISTORY: abdominal pain, unspecified without report of trauma    COMPARISON STUDIES: Abdominal series 04/22/2016    FINDINGS:    Abdomen: No definite free air is seen. Several surgical clips within the midabdomen.  Left ureteral stent.  atherosclerosis.    There are several air-fluid levels within the abdomen.  There are dilated small bowel loops..                             X-Ray Chest PA And Lateral (Final result)  Result time 08/11/17 15:51:11    Final result by Hill Mario MD (08/11/17 15:51:11)                 Impression:      No acute cardiopulmonary findings.  Please see above      Electronically signed by: HILL MARIO MD  Date:     08/11/17  Time:    15:51              Narrative:    EXAM: Chest X-ray PA and Lateral    CLINICAL HISTORY:     Vomiting, unspecified    COMPARISON STUDIES:     04/24/2017    FINDINGS:    Surgical clips in the right axilla.  There are surgical staples in the right lung.  Chronic blunting of the right costophrenic sulcus.  Left lung appears clear.  Normal heart  size.. Chronic right rib deformities probably from previous thoracotomy.  Coronary artery stent.  Chronic compression of one of the midthoracic vertebral bodies.  Surgical staples and a ureteral stent is noted on the lateral view..                                 Assessment/Plan:      * Small bowel obstruction due to adhesions    - General Surgery transferring care to    - Supportive care  - IV fluids  - Pt reports large BM yesterday, passing flatus  - Clear liquid diet         Right upper extremity numbness    - CT head showed 1 cm area of hypodensity in the white matter of the left centrum semiovale  - Obtain MRI brain   - Neurology consult   - PT/OT eval  - resume statin/plavix        Hypokalemia    - K+ 3.1  - replete          Coffee ground emesis    - Likely due to persistent vomiting.  - GI consulted, Dr. Tanner aware  - Protonix IV daily for now.  - monitor H/H        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    - H/o Right BKA          CAD;Old MI ; s/p stents x 3 (2000)     - Resume statin/plavix  - Denies chest pain or SOB at this time.          CKD (chronic kidney disease) stage 3, GFR 30-59 ml/min    - Serum creatinine at baseline  - Gentle IV hydration  - Monitor, labs in AM          Essential hypertension    - Hydralazine IV prn  - Resume oral meds  - Monitor and adjust as needed            VTE Risk Mitigation         Ordered     Medium Risk of VTE  Once      08/11/17 2210     Place sequential compression device  Until discontinued      08/11/17 2210     Reason for No Pharmacological VTE Prophylaxis  Once      08/11/17 2210     Place sequential compression device  Until discontinued      08/11/17 2210              Brennen Weiner NP  Department of Hospital Medicine   [facility]

## 2017-08-15 NOTE — PLAN OF CARE
08/15/17 0910   Discharge Assessment   Assessment Type Discharge Planning Assessment   Confirmed/corrected address and phone number on facesheet? Yes   Assessment information obtained from? Patient;Medical Record   Expected Length of Stay (days) (TBD)   Prior to hospitilization cognitive status: Alert/Oriented   Prior to hospitalization functional status: Assistive Equipment;Independent   Current cognitive status: Alert/Oriented   Current Functional Status: Independent;Assistive Equipment   Arrived From home or self-care   Lives With spouse;other (see comments)  (25 year old grandchild)   Able to Return to Prior Arrangements yes   Is patient able to care for self after discharge? Yes   How many people do you have in your home that can help with your care after discharge? 1   Who are your caregiver(s) and their phone number(s)? Laury Ribeiro, spouse  [phone]   Patient's perception of discharge disposition admitted as an inpatient;home or selfcare   Readmission Within The Last 30 Days no previous admission in last 30 days   Patient currently being followed by outpatient case management? No   Patient currently receives home health services? No   Does the patient currently use HME? Yes   Patient currently receives private duty nursing? No   Patient currently receives any other outside agency services? No   Equipment Currently Used at Home wheelchair   Do you have any problems affording any of your prescribed medications? No   Is the patient taking medications as prescribed? yes   Do you have any financial concerns preventing you from receiving the healthcare you need? No   Does the patient have transportation to healthcare appointments? Yes   Transportation Available car   On Dialysis? No   Does the patient receive services at the [facility]mad[facility] Clinic? No   Are there any open cases? No   Discharge Plan A Home with family   Discharge Plan B Home with family   Patient/Family In Agreement With Plan yes     Assessment  completed.  Patient does not anticipate any discharge needs.

## 2017-08-15 NOTE — PT/OT/SLP EVAL
Physical Therapy  Evaluation    Kodi Ribeiro   MRN: [id number]   Admitting Diagnosis: Small bowel obstruction due to adhesions    PT Received On: 08/15/17  PT Start Time: 1412     PT Stop Time: 1435    PT Total Time (min): 23 min       Billable Minutes:  EVAL: 13 TA:10      Diagnosis: Small bowel obstruction due to adhesions  P.T. 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    Late complications of amputation stump     rseolved with further amputation( MRSA then none since 2014)    Lipoma of colon     colonoscopy 2/21/2014    Myocardial infarction     per patient 2000 & 9/2012    Peripheral vascular disease     Phantom limb syndrome     patient reports only intermittent not problematic, not worsening    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    Spinal cord disease     L4L5 disc    Tobacco dependence     resolved    Ureteral stent retained       Past Surgical History:   Procedure Laterality Date    ABDOMINAL AORTIC ANEURYSM REPAIR      ABDOMINAL AORTIC ANEURYSM REPAIR  1996/2014    AMPUTATION      AORTA - BILATERAL FEMORAL ARTERY BYPASS GRAFT  2014    Left and right leg    CORONARY ANGIOPLASTY WITH STENT PLACEMENT  2000    Three placed in heart    FOOT AMPUTATION THROUGH METATARSAL  1996    left    FOOT SURGERY Bilateral 1980's    per patient multiple toe amputations prior to.  partial foot amputation:first great toe then other toes     KIDNEY SURGERY  2014    per patient separation of horseshoe kidney @ time of AAA repair    LUNG LOBECTOMY Right 1970s    per patient not cancer    right below knee  amputation  2009 (approx)    SMALL INTESTINE SURGERY  2014    per patient partial @ time of aaa repair  not small bowel - large bowel bowel compromised bynghia AAAbowel    TONSILLECTOMY  1955 aprox    URETERAL STENT PLACEMENT Left     annually replaced since 2012 or so  Dr Jin       Referring physician: ZEINAB  Date referred to PT: 8/15/2017      General Precautions: Standard, fall (PT REQUIRES PRAOTHESIS FOR GT . PT R BKA)  Orthopedic Precautions:     Braces:              Patient History:  Lives With: spouse  Living Arrangements: house  Home Layout: Able to live on 1st floor  Stair Railings at Home: none  Transportation Available: none  Living Environment Comment: PT LIVES IN A TRAILOR AND HAS A RAMP TO ENTER HOME. PT WORKS CALLING [employer]  Equipment Currently Used at Home: wheelchair, prosthesis  DME owned (not currently used): single point cane and bedside commode    Previous Level of Function:  Ambulation Skills: needs device  Transfer Skills: needs device  ADL Skills: needs device  Work/Leisure Activity: needs device    Subjective:  Communicated with NURSE PEREZ AND EPIC CHART REVIEW  prior to session.   PT AGREED TO EVAL AND TX   Chief Complaint: R UE WEAKNESS AND SWELLING   Patient goals: INC MOBILITY     Pain/Comfort  Pain Rating 1: 0/10  Pain Rating Post-Intervention 1: 0/10      Objective:   Patient found with: peripheral IV     Cognitive Exam:  Oriented to: Person, Place, Time and Situation    Follows Commands/attention: Follows multistep  commands  Communication: clear/fluent  Safety awareness/insight to disability: intact    Physical Exam:  Postural examination/scapula alignment: Rounded shoulder and Head forward    Skin integrity: Visible skin intact  Edema: Moderate R UE      Sensation:   Impaired  light/touch RUE      Lower Extremity Range of Motion:  Right Lower Extremity: WFL  Left Lower Extremity: WFL    Lower Extremity Strength:  Right Lower Extremity: WFL  Left Lower Extremity: WFL     Fine  motor coordination:  Impaired  Right hand thumb/finger opposition skills IMPAIRED      Functional Mobility:  Bed Mobility:  Rolling/Turning Right: Modified independent  Scooting/Bridging: Modified Independent  Supine to Sit: Modified Independent  Sit to Supine: Modified Independent    Transfers:  Bed <> Chair Technique: Squat Pivot  Bed <> Chair Assistance: Minimum Assistance  Bed <> Chair Assistive Device: No Assistive Device    Gait:   Gait Distance: UNABLE . PT DOESNT HAVE PROSTHESIS. P.T. REQUESTED WIFE BRING THEM TO HOSPITAL     Therapeutic Activities and Exercises:  PT SEATED EOB FOR ASSESSMENT. P.T. DISCUSSED BRING PROSTHESIS FOR P.T. PT WIFE AGREED. PT T/F TO CHAIR WITH LEFT WITH MIN A. PT LEFT SEATED IN CHAIR AND NURSE CHRIS AWARE TO ASSIST BACK TO BED.     AM-PAC 6 CLICK MOBILITY  How much help from another person does this patient currently need?   1 = Unable, Total/Dependent Assistance  2 = A lot, Maximum/Moderate Assistance  3 = A little, Minimum/Contact Guard/Supervision  4 = None, Modified [place]/Independent    Turning over in bed (including adjusting bedclothes, sheets and blankets)?: 4  Sitting down on and standing up from a chair with arms (e.g., wheelchair, bedside commode, etc.): 1  Moving from lying on back to sitting on the side of the bed?: 4  Moving to and from a bed to a chair (including a wheelchair)?: 3  Need to walk in hospital room?: 1  Climbing 3-5 steps with a railing?: 1  Total Score: 14     AM-PAC Raw Score CMS G-Code Modifier Level of Impairment Assistance   6 % Total / Unable   7 - 9 CM 80 - 100% Maximal Assist   10 - 14 CL 60 - 80% Moderate Assist   15 - 19 CK 40 - 60% Moderate Assist   20 - 22 CJ 20 - 40% Minimal Assist   23 CI 1-20% SBA / CGA   24 CH 0% Independent/ Mod I     Patient left up in chair with call button in reach.    Assessment:   Kodi ELIAS Ribeiro is a 67 y.o. male with a medical diagnosis of Small bowel obstruction due to adhesions and presents with  DEBILITY AND R UE WEAKNESS. PT WILL BENEFIT FROM P.T. TO ADDRESS IMPAIRMENTS LISTED. .    Rehab identified problem list/impairments: Rehab identified problem list/impairments: weakness, impaired endurance, impaired self care skills, impaired functional mobilty, gait instability, decreased upper extremity function, decreased ROM, impaired balance, impaired sensation, decreased lower extremity function    Rehab potential is good.    Activity tolerance: Fair    Discharge recommendations: Discharge Facility/Level Of Care Needs: rehabilitation facility, nursing facility, skilled (DEPENDING ON PROGRESS AND PROSTHESIS AVAILABILITY.)     Barriers to discharge: Barriers to Discharge: None    Equipment recommendations: Equipment Needed After Discharge: none     GOALS:    Physical Therapy Goals        Problem: Physical Therapy Goal    Goal Priority Disciplines Outcome Goal Variances Interventions   Physical Therapy Goal     PT/OT, PT      Description:  PT WILL BE SEEN FOR P.T. FOR A MIN OF 5 OUT OF 7 DAYS A WEEK  LT17  1. PT WILL COMPLETE T/F TO CHAIR WITH S.  2. PT WILL GT TRAIN WITH PROTHESIS X 50' WITH MIN A WITH OR WITHOUT RW  3. PT WILL COMPLETE BED MOBILITY IND.                     PLAN:    Patient to be seen   to address the above listed problems via gait training, therapeutic activities, therapeutic exercises  Plan of Care expires:    Plan of Care reviewed with: patient, spouse    Functional Assessment Tool Used: [place] AM PAC  Score: CL  Functional Limitation: Mobility: Walking and moving around  Mobility: Walking and Moving Around Current Status (): CL  Mobility: Walking and Moving Around Goal Status (): BENITA Doss, PT  08/15/2017

## 2017-08-15 NOTE — ASSESSMENT & PLAN NOTE
- General Surgery transferring care to    - Supportive care  - IV fluids  - Pt reports large BM yesterday, passing flatus  - Clear liquid diet

## 2017-08-15 NOTE — PLAN OF CARE
Problem: Patient Care Overview  Goal: Plan of Care Review  Outcome: Ongoing (interventions implemented as appropriate)  Pt remained free of injury during shift, stable condition, pain adequately controlled with PRN medication and sleeping between care, tolerating clear liquid diet well and no report of nausea, had high blood pressure during shift and received scheduled and PRN medications per orders, passing flatus but had BM last 8/13/17,  no acute distress, receiving IV fluids, on cardiac monitoring (SR with PVCs), and will continue to monitor. 24hr chart review performed.

## 2017-08-15 NOTE — ASSESSMENT & PLAN NOTE
- CT head showed 1 cm area of hypodensity in the white matter of the left centrum semiovale  - Obtain MRI brain   - Neurology consult   - PT/OT eval  - resume statin/plavix

## 2017-08-15 NOTE — PLAN OF CARE
Problem: Patient Care Overview  Goal: Plan of Care Review  Outcome: Ongoing (interventions implemented as appropriate)  Fall precautions maintained, pt free from injuries/fall, pt able to reposition self in bed using overhead trapeze, has not ambulated out of bed at this time. Denies pain, denies nausea/vomiting. Appears anxious when awake, refused MRI, refused ativan. Pt encouraged to breathe slowly, allowed pt to express anxiety, spouse encouraged to stay at bedside. Pt reports numbness on right arm, discomfort when right arm up is lifted up above the head, no deficit noted,  moderate, pulse good. NSR-ST on tele. BP has been running high, hydralazine prn. POC and meds reviewed w/ pt and spouse, both verbalizes understanding but pt may need reinforcement. Side rails x 2, bed locked and low, bed alarm on, room near the station. Chart check done. Will cont to monitor.

## 2017-08-15 NOTE — SUBJECTIVE & OBJECTIVE
Interval History: The patient began complaining of RUE numbness yesterday. On exam strength was equal bilaterally. Head CT showed a vague 1 cm area of hypodensity in the white matter of the left centrum semiovale. Will obtain an MRI of the brain today and consult Neurology.     Review of Systems   Constitutional: Negative.  Negative for activity change, appetite change, chills, fatigue, fever and unexpected weight change.   HENT: Negative.  Negative for congestion, facial swelling, nosebleeds, rhinorrhea, sinus pressure, sneezing and sore throat.    Eyes: Negative.  Negative for photophobia, discharge, redness and visual disturbance.   Respiratory: Negative.  Negative for apnea, cough, chest tightness, shortness of breath, wheezing and stridor.    Cardiovascular: Negative.  Negative for chest pain, palpitations and leg swelling.   Gastrointestinal: Positive for abdominal pain (discomfort), nausea and vomiting. Negative for abdominal distention, anal bleeding, blood in stool, constipation and diarrhea.   Endocrine: Negative.  Negative for polydipsia, polyphagia and polyuria.   Genitourinary: Negative.  Negative for difficulty urinating, discharge, dysuria, flank pain, frequency, hematuria and urgency.   Musculoskeletal: Negative.  Negative for arthralgias, back pain, gait problem, joint swelling, myalgias, neck pain and neck stiffness.   Skin: Negative.  Negative for color change, pallor and rash.   Allergic/Immunologic: Negative.  Negative for environmental allergies, food allergies and immunocompromised state.   Neurological: Positive for numbness. Negative for dizziness, tremors, seizures, syncope, facial asymmetry, speech difficulty, weakness, light-headedness and headaches.        LUE numbness   Hematological: Negative.    Psychiatric/Behavioral: Negative.  Negative for behavioral problems, confusion, hallucinations and suicidal ideas. The patient is not nervous/anxious.    All other systems reviewed and are  negative.    Objective:     Vital Signs (Most Recent):  Temp: 98.5 °F (36.9 °C) (08/15/17 0736)  Pulse: 104 (08/15/17 0932)  Resp: (!) 22 (08/15/17 0932)  BP: (!) 158/74 (08/15/17 0932)  SpO2: 98 % (08/15/17 0932) Vital Signs (24h Range):  Temp:  [98.5 °F (36.9 °C)-98.9 °F (37.2 °C)] 98.5 °F (36.9 °C)  Pulse:  [] 104  Resp:  [18-22] 22  SpO2:  [94 %-98 %] 98 %  BP: (158-185)/(74-86) 158/74     Weight: 90.7 kg (200 lb)  Body mass index is 26.39 kg/m².    Intake/Output Summary (Last 24 hours) at 08/15/17 1155  Last data filed at 08/15/17 0810   Gross per 24 hour   Intake          3586.25 ml   Output             1300 ml   Net          2286.25 ml      Physical Exam   Constitutional: He is oriented to person, place, and time. He appears well-developed and well-nourished. No distress.   Appears uncomfortable.   HENT:   Head: Normocephalic and atraumatic.   Eyes: Conjunctivae and EOM are normal. Pupils are equal, round, and reactive to light. No scleral icterus.   Neck: Normal range of motion. Neck supple. No thyromegaly present.   Cardiovascular: Normal rate, regular rhythm, normal heart sounds and intact distal pulses.    No murmur heard.  Pulmonary/Chest: Effort normal and breath sounds normal. No respiratory distress. He has no wheezes. He exhibits no tenderness.   Abdominal: Soft. He exhibits no distension. Bowel sounds are decreased. There is no tenderness.   Musculoskeletal: Normal range of motion. He exhibits no edema, tenderness or deformity.   Right BKA, left foot amputation   Lymphadenopathy:     He has no cervical adenopathy.   Neurological: He is alert and oriented to person, place, and time. No cranial nerve deficit. He exhibits normal muscle tone. Coordination normal.   Patient is alert and oriented to person, place and time. Pupils ERRL and EOM normal. No cranial nerve deficit and cranial nerves II-XII are intact. Strength is full bilaterally; it is equal and 5/5 in bilateral upper and lower  extremities. There is no pronator drift of outstretched arms. Light touch sense is intact. Speech is clear and normal. DTRs are normal and symmetric; they are 2+ and equal bilaterally. No acute focal neurological deficits noted.      Skin: Skin is warm and dry. No rash noted. He is not diaphoretic. No erythema.   Psychiatric: He has a normal mood and affect. His behavior is normal. Judgment and thought content normal.   Nursing note and vitals reviewed.      Significant Labs: All pertinent labs within the past 24 hours have been reviewed.    Significant Imaging:   Imaging Results          X-Ray Abdomen Flat And Erect (Final result)  Result time 08/15/17 11:37:29    Final result by RADHA Medellin Sr., MD (08/15/17 11:37:29)                 Impression:      1. There are dilated loops of small bowel. In the expected location of the mid to distal portion of the ileum there is a dilated loop of small bowel with a diameter of 4.1 cm. On the prior examination it measured 4.4 cm. This is consistent with the patient's history of a small bowel obstruction.  2. There is a suspected compression fracture of the L1 vertebral body.  3. Surgical changes      Electronically signed by: RADHA MEDELLIN MD  Date:     08/15/17  Time:    11:37              Narrative:    Flat and erect KUB    History: small bowel obstruction; resolving    Finding: Comparison was made to a prior examination performed on 8/13/2017. There are dilated loops of small bowel. In the expected location of the mid to distal portion of the ileum there is a dilated loop of small bowel with a diameter of 4.1 cm. On the prior examination it measured 4.4 cm. There is no pneumoperitoneum. There is a suspected compression fracture of the L1 vertebral body. There are surgical clips projected over the abdomen and pelvis. There is a left ureteral catheter again visualized. There is partial visualization of a vascular stent in the expected location of the left superficial  femoral artery.                             CT Head Without Contrast (Final result)  Result time 08/14/17 18:12:27    Final result by Dwayne Nowak Jr., MD (08/14/17 18:12:27)                 Impression:     Small subtle zone of hypodensity in the left centrum semiovale white matter, with possibilities as above.  No acute hemorrhage is noted.      All CT scans at this facility use dose modulation, iterative reconstruction, and/or weight based dosing when appropriate to reduce radiation dose to as low as reasonably achievable.        Electronically signed by: DWAYNE NOWAK MD  Date:     08/14/17  Time:    18:12              Narrative:    Exam: CT HEAD WITHOUT CONTRAST    History:  Right upper extremity numbness/weakness      Technique: Noncontrast axial images of the head were obtained.  Motion artifact.  A satisfactory exam was acquired.    Comparison:None    Findings: Vague 1 cm area of hypodensity in the white matter of the left centrum semiovale, image 20 series 5.  Possibilities include an area of microvascular ischemic change or subacute lacunar ischemic infarct.  Ventricular system is normal.  No hydrocephalus.  No midline shift.  No abnormal density to indicate acute major vascular distribution ischemic infarction or hemorrhage.  No mass effect.  No extra-axial fluid collections.     Visualized paranasal sinuses and mastoid air cells appear clear.      No calvarial fracture.                             X-Ray Abdomen Flat And Erect (Final result)  Result time 08/13/17 09:56:39    Final result by Freddie Nelson MD (08/13/17 09:56:39)                 Impression:     Small bowel obstruction.  Increasing distention compared to prior exam.      Electronically signed by: FREDDIE NELSON MD  Date:     08/13/17  Time:    09:56              Narrative:    PROCEDURE: XR ABDOMEN FLAT AND ERECT, 08/13/17 08:43:02    HISTORY:  small bowel obstruction    COMPARISON STUDIES: August 11, 2017    FINDINGS:   Diffusely dilated  small bowel loops throughout the abdomen with maximum diameter of approximately 4.4 cm.  Scattered surgical clips.  Small amounts of air in the colon with some contrast in the lower left colon.  Nasogastric tube present.  left sided nephroureteral stent.                             X-Ray Chest 1 View (Final result)  Result time 08/11/17 19:56:15    Final result by Stefan Gagnon MD (08/11/17 19:56:15)                 Impression:     Tip of the NG tube not visualized. See above.      Electronically signed by: STEFAN GAGNON MD  Date:     08/11/17  Time:    19:56              Narrative:    Exam: Chest X-ray, one view.    History: Encounter for fitting and adjustment of other gastrointestinal appliance and device    Findings: Comparison is made to prior examination at 1536 hrs. NG tube has been placed, extending into the distal esophagus, tip not visualized. Consider AP view of the abdomen for further assessment.                             CT Abdomen Pelvis  Without Contrast (Final result)  Result time 08/11/17 18:38:49   Procedure changed from CT Abdomen Pelvis With Contrast     Final result by Stefan Gagnon MD (08/11/17 18:38:49)                 Impression:     Mid small bowel obstruction, likely related to postoperative adhesions. See above. No free air or pneumatosis identified.      Electronically signed by: STEFAN GAGNON MD  Date:     08/11/17  Time:    18:38              Narrative:    Examination: CT of the abdomen and pelvis without contrast.    History: Vomiting    Findings: Comparison is made to prior examination dated 03/20/2000 1534 she kidney again noted. Left ureteral stent remains in place. Postoperative changes again noted in the midline retroperitoneum.    Mid to proximal small bowel loops are dilated, with marked distention of the stomach as well. Contrast noted in the distal esophagus, likely related to reflux. Distal small bowel loops are decompressed, with transition point noted at the lower  midline retroperitoneum, at the site of prior surgery. No free air or pneumatosis, and otherwise no significant change from prior exam.                             X-Ray Abdomen Flat And Erect (Final result)  Result time 08/11/17 15:53:16    Final result by Hill Mario MD (08/11/17 15:53:16)                 Impression:      Bowel gas pattern may be due to an ileus.  Small bowel obstruction not excluded.    Other findings as described above.      Electronically signed by: HILL MARIO MD  Date:     08/11/17  Time:    15:53              Narrative:    EXAM:  2 view abdomen, flat and erect    CLINICAL HISTORY: abdominal pain, unspecified without report of trauma    COMPARISON STUDIES: Abdominal series 04/22/2016    FINDINGS:    Abdomen: No definite free air is seen. Several surgical clips within the midabdomen.  Left ureteral stent.  atherosclerosis.    There are several air-fluid levels within the abdomen.  There are dilated small bowel loops..                             X-Ray Chest PA And Lateral (Final result)  Result time 08/11/17 15:51:11    Final result by Hill Mario MD (08/11/17 15:51:11)                 Impression:      No acute cardiopulmonary findings.  Please see above      Electronically signed by: HILL MARIO MD  Date:     08/11/17  Time:    15:51              Narrative:    EXAM: Chest X-ray PA and Lateral    CLINICAL HISTORY:     Vomiting, unspecified    COMPARISON STUDIES:     04/24/2017    FINDINGS:    Surgical clips in the right axilla.  There are surgical staples in the right lung.  Chronic blunting of the right costophrenic sulcus.  Left lung appears clear.  Normal heart size.. Chronic right rib deformities probably from previous thoracotomy.  Coronary artery stent.  Chronic compression of one of the midthoracic vertebral bodies.  Surgical staples and a ureteral stent is noted on the lateral view..

## 2017-08-15 NOTE — PT/OT/SLP EVAL
Occupational Therapy  Evaluation    Kodi Ribeiro   MRN: [id number]   Admitting Diagnosis: Small bowel obstruction due to adhesions    OT Date of Treatment: 08/15/17   OT Start Time: 1405  OT Stop Time: 1430  OT Total Time (min): 25 min    Billable Minutes:  Evaluation 15 MINUTES  Self Care/Home Management 10 MINUTES    Diagnosis: Small bowel obstruction due to adhesions   IMPAIRED ADL'S, DECREASE R UE ROM, IMPAIRED FUNCTIONAL MOBILITY, IMPAIRED T/F'S    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    Late complications of amputation stump     rseolved with further amputation( MRSA then none since 2014)    Lipoma of colon     colonoscopy 2/21/2014    Myocardial infarction     per patient 2000 & 9/2012    Peripheral vascular disease     Phantom limb syndrome     patient reports only intermittent not problematic, not worsening    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    Spinal cord disease     L4L5 disc    Tobacco dependence     resolved    Ureteral stent retained       Past Surgical History:   Procedure Laterality Date    ABDOMINAL AORTIC ANEURYSM REPAIR      ABDOMINAL AORTIC ANEURYSM REPAIR  1996/2014    AMPUTATION      AORTA - BILATERAL FEMORAL ARTERY BYPASS GRAFT  2014    Left and right leg    CORONARY ANGIOPLASTY WITH STENT PLACEMENT  2000    Three placed in heart    FOOT AMPUTATION THROUGH METATARSAL  1996    left    FOOT SURGERY Bilateral 1980's    per patient multiple toe amputations prior to.  partial foot amputation:first great toe then other toes     KIDNEY SURGERY  2014    per patient separation of horseshoe kidney @ time of AAA  repair    LUNG LOBECTOMY Right 1970s    per patient not cancer    right below knee amputation  2009 (approx)    SMALL INTESTINE SURGERY  2014    per patient partial @ time of aaa repair  not small bowel - large bowel bowel compromised bynghia AAAbowel    TONSILLECTOMY  1955 aprox    URETERAL STENT PLACEMENT Left     annually replaced since 2012 or so  Dr Jin       Referring physician: DR. SALAS  Date referred to OT: 8-5-17    General Precautions: Standard, fall  Orthopedic Precautions: N/A  Braces:            Patient History:  Living Environment  Lives With: spouse  Living Arrangements: house  Home Layout: Able to live on 1st floor  Living Environment Comment: PT REPORTS PLOF (I) TO MOD (I)  WITH RAMP TO ENTER HOME. PT WORKS  AND (I) WITH ADL'S  Equipment Currently Used at Home: prosthesis, wheelchair    Prior level of function:   Bed Mobility/Transfers: independent  Grooming: independent  Bathing: independent  Upper Body Dressing: independent  Lower Body Dressing: independent  Toileting: independent  Driving License: Yes     Dominant hand: right    Subjective:  Communicated with NURSE PEREZ AND EPIC CHART REVIEW prior to session.    Chief Complaint: DEBILITY AND GENERALIZED WEAKNESS  Patient/Family stated goals:     Pain/Comfort  Pain Rating 1: 0/10  Location - Side 1: Right    Objective:  Patient found with: peripheral IV    Cognitive Exam:  Oriented to: Person, Place, Time and Situation  Follows Commands/attention: Follows two-step commands  Communication: clear/fluent  Memory:  No Deficits noted  Safety awareness/insight to disability: intact  Coping skills/emotional control: Appropriate to situation    Visual/perceptual:  INTACT    Physical Exam:  Postural examination/scapula alignment: Rounded shoulder and Head forward MILDLY  Skin integrity: Visible skin intact and Thin  Edema: Moderate R UE    Sensation:   IMPAIRED R UE   Upper Extremity Range of Motion:  Right Upper Extremity: Deficits: APPROX. 40  DEGREES SHOULD FLEX  Left Upper Extremity: WFL    Upper Extremity Strength:  Right Upper Extremity: Deficits: MMT: 2/5 GROSSLY  Left Upper Extremity: MMT: 3+/5 GROSSLY   Strength: MMT: 3/5 GROSSLY      Fine motor coordination:   IMPAIRED R UE    Gross motor coordination: LIMITED R UE    Functional Mobility:  Bed Mobility:  Rolling/Turning to Left: Modified independent  Rolling/Turning Right: Modified independent  Scooting/Bridging: Modified Independent  Supine to Sit: Modified Independent  Sit to Supine: Modified Independent    Transfers:  Sit <> Stand Assistance: Minimum Assistance  Sit <> Stand Assistive Device: Rolling Walker  Bed <> Chair Technique: Stand Pivot  Bed <> Chair Transfer Assistance: Minimum Assistance  Bed <> Chair Assistive Device: Rolling Walker    Functional Ambulation: NA    Activities of Daily Living:     Feeding adaptive equipment: NA  UE Dressing Level of Assistance: Minimum assistance  UE adaptive equipment: NA  LE Dressing Level of Assistance: Minimum assistance  LE adaptive equipment: NA                    Bathing adaptive equipment: NA    Balance:   Static Sit: GOOD: Takes MODERATE challenges from all directions  Dynamic Sit: FAIR+: Maintains balance through MINIMAL excursions of active trunk motion  Static Stand: POOR: Needs MODERATE assist to maintain  Dynamic stand: POOR: N/A    Therapeutic Activities and Exercises:      AM-PAC 6 CLICK ADL  How much help from another person does this patient currently need?  1 = Unable, Total/Dependent Assistance  2 = A lot, Maximum/Moderate Assistance  3 = A little, Minimum/Contact Guard/Supervision  4 = None, Modified [place]/Independent    Putting on and taking off regular lower body clothing? : 2  Bathing (including washing, rinsing, drying)?: 2  Toileting, which includes using toilet, bedpan, or urinal? : 2  Putting on and taking off regular upper body clothing?: 3  Taking care of personal grooming such as brushing teeth?: 3  Eating  "meals?: 3  Total Score: 15    AM-PAC Raw Score CMS "G-Code Modifier Level of Impairment Assistance   6 % Total / Unable   7 - 9 CM 80 - 100% Maximal Assist   10-14 CL 60 - 80% Moderate Assist   15 - 19 CK 40 - 60% Moderate Assist   20 - 22 CJ 20 - 40% Minimal Assist   23 CI 1-20% SBA / CGA   24 CH 0% Independent/ Mod I       Patient left up in chair with all lines intact, call button in reach, NURSE CHRIS notified and SPOUSE present    Assessment:  Kodi Ribeiro is a 67 y.o. male with a medical diagnosis of Small bowel obstruction due to adhesions and presents with IMPAIRED ADL'S, DECREASE R UE ROM, IMPAIRED FUNCTIONAL MOBILITY, IMPAIRED T/F'S. PT WILL BENEFIT FROM SKILLED O.T.    Rehab identified problem list/impairments: Rehab identified problem list/impairments: weakness, impaired self care skills, impaired balance, decreased safety awareness, decreased ROM, impaired endurance, impaired functional mobilty, decreased upper extremity function, gait instability, decreased lower extremity function    Rehab potential is good.    Activity tolerance: Good    Discharge recommendations: Discharge Facility/Level Of Care Needs: rehabilitation facility, nursing facility, skilled     Barriers to discharge: Barriers to Discharge: None    Equipment recommendations: none     GOALS:    Occupational Therapy Goals        Problem: Occupational Therapy Goal    Goal Priority Disciplines Outcome Interventions   Occupational Therapy Goal     OT, PT/OT     Description:  OT GOALS TO BE MET 8-22-17  1. S WITH UE DRESSING  2. S WITH LE DRESSING  3. PT WILL TOLERATE 1 SET X 15 REPS B UE ROM EXERCISE                    PLAN:  Patient to be seen 3 x/week to address the above listed problems via self-care/home management, therapeutic activities, therapeutic exercises  Plan of Care expires:    Plan of Care reviewed with: patient, spouse         Radha Armstrong, OT  08/15/2017  "

## 2017-08-15 NOTE — PROGRESS NOTES
The patient was seen and examined. Continue to pass gas but no more bowel movement since yesterday. Soft but still distended abdomen. Tympanitic! No pain.  On clear liquid diet.  Left UE weakness, and CT brain showed abnormalities per  service. Neurology consultation is requested per .  No surgical issues at this time.    Continue with current plan.   Transfer the admission status to Hospital Medicine. I will stay to follow the patient for surgical issues as a consultant.  Discussed with Dr. Hutton.    Ta Cook

## 2017-08-15 NOTE — NURSING
Pt given hydralazine per order, BP at 2050 after administration was 179/ 84. Dr. Wild notified, he ordered Norvasc 10mg PO be given daily starting now. Telephone with readback. Will continue to monitor.

## 2017-08-16 PROBLEM — I65.22 LEFT CAROTID ARTERY STENOSIS: Status: ACTIVE | Noted: 2017-08-16

## 2017-08-16 LAB
ANION GAP SERPL CALC-SCNC: 13 MMOL/L
BASOPHILS # BLD AUTO: 0.02 K/UL
BASOPHILS NFR BLD: 0.4 %
BUN SERPL-MCNC: 11 MG/DL
CALCIUM SERPL-MCNC: 8.8 MG/DL
CHLORIDE SERPL-SCNC: 103 MMOL/L
CHOLEST/HDLC SERPL: 3.6 {RATIO}
CO2 SERPL-SCNC: 21 MMOL/L
CREAT SERPL-MCNC: 1 MG/DL
DIASTOLIC DYSFUNCTION: YES
DIFFERENTIAL METHOD: ABNORMAL
EOSINOPHIL # BLD AUTO: 0.1 K/UL
EOSINOPHIL NFR BLD: 2.5 %
ERYTHROCYTE [DISTWIDTH] IN BLOOD BY AUTOMATED COUNT: 13.5 %
EST. GFR  (AFRICAN AMERICAN): >60 ML/MIN/1.73 M^2
EST. GFR  (NON AFRICAN AMERICAN): >60 ML/MIN/1.73 M^2
ESTIMATED PA SYSTOLIC PRESSURE: 20.79
GLUCOSE SERPL-MCNC: 87 MG/DL
HCT VFR BLD AUTO: 34.6 %
HDL/CHOLESTEROL RATIO: 27.9 %
HDLC SERPL-MCNC: 122 MG/DL
HDLC SERPL-MCNC: 34 MG/DL
HGB BLD-MCNC: 11.7 G/DL
LDLC SERPL CALC-MCNC: 62.8 MG/DL
LYMPHOCYTES # BLD AUTO: 1 K/UL
LYMPHOCYTES NFR BLD: 17.3 %
MCH RBC QN AUTO: 29.2 PG
MCHC RBC AUTO-ENTMCNC: 33.8 G/DL
MCV RBC AUTO: 86 FL
MONOCYTES # BLD AUTO: 0.6 K/UL
MONOCYTES NFR BLD: 11.6 %
NEUTROPHILS # BLD AUTO: 3.8 K/UL
NEUTROPHILS NFR BLD: 68.2 %
NONHDLC SERPL-MCNC: 88 MG/DL
PLATELET # BLD AUTO: 146 K/UL
PMV BLD AUTO: 10 FL
POTASSIUM SERPL-SCNC: 3.4 MMOL/L
RBC # BLD AUTO: 4.01 M/UL
RETIRED EF AND QEF - SEE NOTES: 60 (ref 55–65)
SODIUM SERPL-SCNC: 137 MMOL/L
TRIGL SERPL-MCNC: 126 MG/DL
WBC # BLD AUTO: 5.5 K/UL

## 2017-08-16 PROCEDURE — 63600175 PHARM REV CODE 636 W HCPCS: Performed by: EMERGENCY MEDICINE

## 2017-08-16 PROCEDURE — 21400001 HC TELEMETRY ROOM

## 2017-08-16 PROCEDURE — 25000003 PHARM REV CODE 250: Performed by: NURSE PRACTITIONER

## 2017-08-16 PROCEDURE — 85025 COMPLETE CBC W/AUTO DIFF WBC: CPT

## 2017-08-16 PROCEDURE — 93306 TTE W/DOPPLER COMPLETE: CPT

## 2017-08-16 PROCEDURE — 25000003 PHARM REV CODE 250: Performed by: SURGERY

## 2017-08-16 PROCEDURE — 36415 COLL VENOUS BLD VENIPUNCTURE: CPT

## 2017-08-16 PROCEDURE — 25000003 PHARM REV CODE 250: Performed by: INTERNAL MEDICINE

## 2017-08-16 PROCEDURE — 97110 THERAPEUTIC EXERCISES: CPT

## 2017-08-16 PROCEDURE — 97530 THERAPEUTIC ACTIVITIES: CPT

## 2017-08-16 PROCEDURE — 25500020 PHARM REV CODE 255: Performed by: EMERGENCY MEDICINE

## 2017-08-16 PROCEDURE — 63600175 PHARM REV CODE 636 W HCPCS: Performed by: SURGERY

## 2017-08-16 PROCEDURE — 99900035 HC TECH TIME PER 15 MIN (STAT)

## 2017-08-16 PROCEDURE — 80048 BASIC METABOLIC PNL TOTAL CA: CPT

## 2017-08-16 PROCEDURE — C9113 INJ PANTOPRAZOLE SODIUM, VIA: HCPCS | Performed by: EMERGENCY MEDICINE

## 2017-08-16 PROCEDURE — 93306 TTE W/DOPPLER COMPLETE: CPT | Mod: 26,,, | Performed by: INTERNAL MEDICINE

## 2017-08-16 RX ORDER — POTASSIUM CHLORIDE 20 MEQ/1
40 TABLET, EXTENDED RELEASE ORAL ONCE
Status: COMPLETED | OUTPATIENT
Start: 2017-08-16 | End: 2017-08-16

## 2017-08-16 RX ADMIN — CLOPIDOGREL BISULFATE 75 MG: 75 TABLET ORAL at 08:08

## 2017-08-16 RX ADMIN — POTASSIUM CHLORIDE 40 MEQ: 1500 TABLET, EXTENDED RELEASE ORAL at 01:08

## 2017-08-16 RX ADMIN — IOHEXOL 100 ML: 350 INJECTION, SOLUTION INTRAVENOUS at 05:08

## 2017-08-16 RX ADMIN — CARVEDILOL 6.25 MG: 6.25 TABLET, FILM COATED ORAL at 08:08

## 2017-08-16 RX ADMIN — PANTOPRAZOLE SODIUM 40 MG: 40 INJECTION, POWDER, FOR SOLUTION INTRAVENOUS at 09:08

## 2017-08-16 RX ADMIN — LOSARTAN POTASSIUM 100 MG: 50 TABLET, FILM COATED ORAL at 08:08

## 2017-08-16 RX ADMIN — SODIUM CHLORIDE, SODIUM LACTATE, POTASSIUM CHLORIDE, AND CALCIUM CHLORIDE: 600; 310; 30; 20 INJECTION, SOLUTION INTRAVENOUS at 01:08

## 2017-08-16 RX ADMIN — CARVEDILOL 6.25 MG: 6.25 TABLET, FILM COATED ORAL at 06:08

## 2017-08-16 RX ADMIN — HYDROMORPHONE HYDROCHLORIDE 1 MG: 1 INJECTION, SOLUTION INTRAMUSCULAR; INTRAVENOUS; SUBCUTANEOUS at 06:08

## 2017-08-16 RX ADMIN — HYDROMORPHONE HYDROCHLORIDE 1 MG: 1 INJECTION, SOLUTION INTRAMUSCULAR; INTRAVENOUS; SUBCUTANEOUS at 11:08

## 2017-08-16 RX ADMIN — PRAVASTATIN SODIUM 80 MG: 20 TABLET ORAL at 09:08

## 2017-08-16 RX ADMIN — SODIUM CHLORIDE, SODIUM LACTATE, POTASSIUM CHLORIDE, AND CALCIUM CHLORIDE: 600; 310; 30; 20 INJECTION, SOLUTION INTRAVENOUS at 10:08

## 2017-08-16 RX ADMIN — AMLODIPINE BESYLATE 10 MG: 10 TABLET ORAL at 08:08

## 2017-08-16 RX ADMIN — PANTOPRAZOLE SODIUM 40 MG: 40 INJECTION, POWDER, FOR SOLUTION INTRAVENOUS at 08:08

## 2017-08-16 NOTE — ASSESSMENT & PLAN NOTE
- CT head showed 1 cm area of hypodensity in the white matter of the left centrum semiovale  - Unable to do MRI brain   - Neurology consult   - PT/OT eval  - resume statin/plavix  - CT head with and without today   - CTA head  - ECHO pending

## 2017-08-16 NOTE — ASSESSMENT & PLAN NOTE
Nutrition problem:   Inadequate energy intake    Related to (etiology):   Current medical condition    Signs and Symptoms (as evidenced by):   NG decompression, Currently on clear liquid diet and PO intake less than 50 %.     Interventions/Recommendations (treatment strategy):  1. Advance diet as tolerated to Cardiac/ Soft GI. 2. Consider adding oral supplements with meals ~Boost breeze. 3. If patient does not tolerate oral diet which advancement to at least full liquids within 72 hrs.  Consider Nutrition Support ~ Clinimix 4.25/10 at 100 ml with 20 % 250 ml lipids daily. (1724 kcal, 102 g protein, 2400 ml, 1.83 mg/kg/min dextrose infusion rate).     Nutrition Diagnosis Status:   New

## 2017-08-16 NOTE — PLAN OF CARE
CM met with patient to discuss discharge plan.  CM discussed rehab after discharge from hospital.  Patient is agreeable if rehab is needed.  Provided patient/wife with integrated partner list from [facility].  Preference is for [facility].  Choice form completed and placed in blue patient folder.

## 2017-08-16 NOTE — PLAN OF CARE
Problem: Patient Care Overview  Goal: Plan of Care Review  PT REQUIRES MIN A FOR T/F TO CHAIR FROM BED.    Outcome: Ongoing (interventions implemented as appropriate)  Pt remained free of injury during shift, stable condition, pain adequately controlled with PRN medication and sleeping between care, tolerating clear liquid diet well and no report of nausea, passing flatus but had BM last 8/13/17, no acute distress, receiving IV fluids, on cardiac monitoring (SR with PVCs), and will continue to monitor. 24hr chart review performed.

## 2017-08-16 NOTE — PLAN OF CARE
Problem: Patient Care Overview  Goal: Plan of Care Review  PT REQUIRES MIN A FOR T/F TO CHAIR FROM BED.    Outcome: Ongoing (interventions implemented as appropriate)  PT T/F TO CHAIR WITH MIN  A TO LEFT

## 2017-08-16 NOTE — ASSESSMENT & PLAN NOTE
- General Surgery following   - Supportive care  - IV fluids  - Pt reports large BM yesterday, passing flatus  - Clear liquid diet

## 2017-08-16 NOTE — SUBJECTIVE & OBJECTIVE
Interval History: Pt unable to have MRI yesterday due to claustrophobia. Neurology recommends doing a CT head w wo and CTA head to view intracranial vessels. Carotid US showed 90-99% stenosis within the left internal carotid artery. Vascular Surgery consulted.       Review of Systems   Constitutional: Negative.  Negative for activity change, appetite change, chills, fatigue, fever and unexpected weight change.   HENT: Negative.  Negative for congestion, facial swelling, nosebleeds, rhinorrhea, sinus pressure, sneezing and sore throat.    Eyes: Negative.  Negative for photophobia, discharge, redness and visual disturbance.   Respiratory: Negative.  Negative for apnea, cough, chest tightness, shortness of breath, wheezing and stridor.    Cardiovascular: Negative.  Negative for chest pain, palpitations and leg swelling.   Gastrointestinal: Positive for abdominal pain (discomfort), nausea and vomiting. Negative for abdominal distention, anal bleeding, blood in stool, constipation and diarrhea.   Endocrine: Negative.  Negative for polydipsia, polyphagia and polyuria.   Genitourinary: Negative.  Negative for difficulty urinating, discharge, dysuria, flank pain, frequency, hematuria and urgency.   Musculoskeletal: Negative.  Negative for arthralgias, back pain, gait problem, joint swelling, myalgias, neck pain and neck stiffness.   Skin: Negative.  Negative for color change, pallor and rash.   Allergic/Immunologic: Negative.  Negative for environmental allergies, food allergies and immunocompromised state.   Neurological: Positive for numbness. Negative for dizziness, tremors, seizures, syncope, facial asymmetry, speech difficulty, weakness, light-headedness and headaches.        LUE numbness   Hematological: Negative.    Psychiatric/Behavioral: Negative.  Negative for behavioral problems, confusion, hallucinations and suicidal ideas. The patient is not nervous/anxious.    All other systems reviewed and are  negative.    Objective:     Vital Signs (Most Recent):  Temp: 98.5 °F (36.9 °C) (08/16/17 1205)  Pulse: 74 (08/16/17 1205)  Resp: 18 (08/16/17 1205)  BP: (!) 152/67 (08/16/17 1205)  SpO2: 95 % (08/16/17 1205) Vital Signs (24h Range):  Temp:  [98 °F (36.7 °C)-98.6 °F (37 °C)] 98.5 °F (36.9 °C)  Pulse:  [] 74  Resp:  [18] 18  SpO2:  [95 %-98 %] 95 %  BP: (144-169)/(67-76) 152/67     Weight: 90.7 kg (200 lb)  Body mass index is 26.39 kg/m².    Intake/Output Summary (Last 24 hours) at 08/16/17 1233  Last data filed at 08/16/17 1152   Gross per 24 hour   Intake          4813.33 ml   Output             1400 ml   Net          3413.33 ml      Physical Exam   Constitutional: He is oriented to person, place, and time. He appears well-developed and well-nourished. No distress.   Appears uncomfortable.   HENT:   Head: Normocephalic and atraumatic.   Eyes: Conjunctivae and EOM are normal. Pupils are equal, round, and reactive to light. No scleral icterus.   Neck: Normal range of motion. Neck supple. No thyromegaly present.   Cardiovascular: Normal rate, regular rhythm, normal heart sounds and intact distal pulses.    No murmur heard.  Pulmonary/Chest: Effort normal and breath sounds normal. No respiratory distress. He has no wheezes. He exhibits no tenderness.   Abdominal: Soft. He exhibits no distension. Bowel sounds are decreased. There is no tenderness.   Musculoskeletal: Normal range of motion. He exhibits no edema, tenderness or deformity.   Right BKA, left foot amputation   Lymphadenopathy:     He has no cervical adenopathy.   Neurological: He is alert and oriented to person, place, and time. No cranial nerve deficit. He exhibits normal muscle tone. Coordination normal.   Patient is alert and oriented to person, place and time. Pupils ERRL and EOM normal. No cranial nerve deficit and cranial nerves II-XII are intact. Strength is full bilaterally; it is equal and 5/5 in bilateral upper and lower extremities. There is  no pronator drift of outstretched arms. Light touch sense is intact. Speech is clear and normal. DTRs are normal and symmetric; they are 2+ and equal bilaterally. No acute focal neurological deficits noted.      Skin: Skin is warm and dry. No rash noted. He is not diaphoretic. No erythema.   Psychiatric: He has a normal mood and affect. His behavior is normal. Judgment and thought content normal.   Nursing note and vitals reviewed.      Significant Labs: All pertinent labs within the past 24 hours have been reviewed.    Significant Imaging:   Imaging Results          US Carotid Bilateral (Final result)     Abnormal  Result time 08/16/17 12:08:20    Final result by Aurelio Lau MD (08/16/17 12:08:20)                 Impression:        90-99% stenosis within the left internal carotid artery. The patient's nurse was notified at 12 PM on 8/16/17.    **Categories of stenosis (0-15%, 16-49%, 50-69% and 70-99% ) are based on published criteria that have been internally validated.  Degree of stenosis is based on NASCET criteria and refers to the degree of luminal diameter narrowing as a percentage of the normal ICA distal to the stenosis and any area of post stenotic dilation.        Electronically signed by: AURELIO LAU MD  Date:     08/16/17  Time:    12:08              Narrative:    BILATERAL CAROTID DUPLEX ULTRASOUND.    Comparison: None    History:  Stroke    FINDINGS:     Right common carotid:   Peak systolic velocity 155 cm/sec.    Right internal carotid artery: Mild plaque is seen in the bulb    Peak systolic velocity: 123 cm/sec.   IC/CC ratio:  0.8.    Left common carotid:   Peak systolic velocity 67 cm/sec.    Left internal carotid artery: Significant plaque is seen    Peak systolic velocity 582  cm/sec.    IC/CC ratio 8.6.    Both vertebral arteries demonstrate antegrade flow.                             X-Ray Abdomen Flat And Erect (Final result)  Result time 08/15/17 11:37:29    Final result by RADHA Bello  Vignesh Ji MD (08/15/17 11:37:29)                 Impression:      1. There are dilated loops of small bowel. In the expected location of the mid to distal portion of the ileum there is a dilated loop of small bowel with a diameter of 4.1 cm. On the prior examination it measured 4.4 cm. This is consistent with the patient's history of a small bowel obstruction.  2. There is a suspected compression fracture of the L1 vertebral body.  3. Surgical changes      Electronically signed by: RADHA RODRIGUEZ MD  Date:     08/15/17  Time:    11:37              Narrative:    Flat and erect KUB    History: small bowel obstruction; resolving    Finding: Comparison was made to a prior examination performed on 8/13/2017. There are dilated loops of small bowel. In the expected location of the mid to distal portion of the ileum there is a dilated loop of small bowel with a diameter of 4.1 cm. On the prior examination it measured 4.4 cm. There is no pneumoperitoneum. There is a suspected compression fracture of the L1 vertebral body. There are surgical clips projected over the abdomen and pelvis. There is a left ureteral catheter again visualized. There is partial visualization of a vascular stent in the expected location of the left superficial femoral artery.                             CT Head Without Contrast (Final result)  Result time 08/14/17 18:12:27    Final result by Dwayne Nowak Jr., MD (08/14/17 18:12:27)                 Impression:     Small subtle zone of hypodensity in the left centrum semiovale white matter, with possibilities as above.  No acute hemorrhage is noted.      All CT scans at this facility use dose modulation, iterative reconstruction, and/or weight based dosing when appropriate to reduce radiation dose to as low as reasonably achievable.        Electronically signed by: DWAYNE NOWAK MD  Date:     08/14/17  Time:    18:12              Narrative:    Exam: CT HEAD WITHOUT CONTRAST    History:  Right upper  extremity numbness/weakness      Technique: Noncontrast axial images of the head were obtained.  Motion artifact.  A satisfactory exam was acquired.    Comparison:None    Findings: Vague 1 cm area of hypodensity in the white matter of the left centrum semiovale, image 20 series 5.  Possibilities include an area of microvascular ischemic change or subacute lacunar ischemic infarct.  Ventricular system is normal.  No hydrocephalus.  No midline shift.  No abnormal density to indicate acute major vascular distribution ischemic infarction or hemorrhage.  No mass effect.  No extra-axial fluid collections.     Visualized paranasal sinuses and mastoid air cells appear clear.      No calvarial fracture.                             X-Ray Abdomen Flat And Erect (Final result)  Result time 08/13/17 09:56:39    Final result by Freddie Nelson MD (08/13/17 09:56:39)                 Impression:     Small bowel obstruction.  Increasing distention compared to prior exam.      Electronically signed by: FREDDIE NELSON MD  Date:     08/13/17  Time:    09:56              Narrative:    PROCEDURE: XR ABDOMEN FLAT AND ERECT, 08/13/17 08:43:02    HISTORY:  small bowel obstruction    COMPARISON STUDIES: August 11, 2017    FINDINGS:   Diffusely dilated small bowel loops throughout the abdomen with maximum diameter of approximately 4.4 cm.  Scattered surgical clips.  Small amounts of air in the colon with some contrast in the lower left colon.  Nasogastric tube present.  left sided nephroureteral stent.                             X-Ray Chest 1 View (Final result)  Result time 08/11/17 19:56:15    Final result by Stefan Gagnon MD (08/11/17 19:56:15)                 Impression:     Tip of the NG tube not visualized. See above.      Electronically signed by: STEFAN GAGNON MD  Date:     08/11/17  Time:    19:56              Narrative:    Exam: Chest X-ray, one view.    History: Encounter for fitting and adjustment of other gastrointestinal  appliance and device    Findings: Comparison is made to prior examination at 1536 hrs. NG tube has been placed, extending into the distal esophagus, tip not visualized. Consider AP view of the abdomen for further assessment.                             CT Abdomen Pelvis  Without Contrast (Final result)  Result time 08/11/17 18:38:49   Procedure changed from CT Abdomen Pelvis With Contrast     Final result by Stefan Gagnon MD (08/11/17 18:38:49)                 Impression:     Mid small bowel obstruction, likely related to postoperative adhesions. See above. No free air or pneumatosis identified.      Electronically signed by: STEFAN GAGNON MD  Date:     08/11/17  Time:    18:38              Narrative:    Examination: CT of the abdomen and pelvis without contrast.    History: Vomiting    Findings: Comparison is made to prior examination dated 03/20/2000 1534 she kidney again noted. Left ureteral stent remains in place. Postoperative changes again noted in the midline retroperitoneum.    Mid to proximal small bowel loops are dilated, with marked distention of the stomach as well. Contrast noted in the distal esophagus, likely related to reflux. Distal small bowel loops are decompressed, with transition point noted at the lower midline retroperitoneum, at the site of prior surgery. No free air or pneumatosis, and otherwise no significant change from prior exam.                             X-Ray Abdomen Flat And Erect (Final result)  Result time 08/11/17 15:53:16    Final result by Hill Gastelum MD (08/11/17 15:53:16)                 Impression:      Bowel gas pattern may be due to an ileus.  Small bowel obstruction not excluded.    Other findings as described above.      Electronically signed by: HILL GASTELUM MD  Date:     08/11/17  Time:    15:53              Narrative:    EXAM:  2 view abdomen, flat and erect    CLINICAL HISTORY: abdominal pain, unspecified without report of trauma    COMPARISON STUDIES:  Abdominal series 04/22/2016    FINDINGS:    Abdomen: No definite free air is seen. Several surgical clips within the midabdomen.  Left ureteral stent.  atherosclerosis.    There are several air-fluid levels within the abdomen.  There are dilated small bowel loops..                             X-Ray Chest PA And Lateral (Final result)  Result time 08/11/17 15:51:11    Final result by Hill Mario MD (08/11/17 15:51:11)                 Impression:      No acute cardiopulmonary findings.  Please see above      Electronically signed by: HILL MARIO MD  Date:     08/11/17  Time:    15:51              Narrative:    EXAM: Chest X-ray PA and Lateral    CLINICAL HISTORY:     Vomiting, unspecified    COMPARISON STUDIES:     04/24/2017    FINDINGS:    Surgical clips in the right axilla.  There are surgical staples in the right lung.  Chronic blunting of the right costophrenic sulcus.  Left lung appears clear.  Normal heart size.. Chronic right rib deformities probably from previous thoracotomy.  Coronary artery stent.  Chronic compression of one of the midthoracic vertebral bodies.  Surgical staples and a ureteral stent is noted on the lateral view..

## 2017-08-16 NOTE — CONSULTS
[facility] -   Adult Nutrition  Consult Note    SUMMARY     Recommendations    Recommendation/Intervention: 1. Advance diet as tolerated to Cardiac/ Soft GI. 2. Consider adding oral supplements with meals ~Boost breeze. 3. If patient does not tolerate oral diet which advancement to at least full liquids within 72 hrs.  Consider Nutrition Support ~ Clinimix 4.25/10 at 100 ml with 20 % 250 ml lipids daily. (1724 kcal, 102 g protein, 2400 ml, 1.83 mg/kg/min dextrose infusion rate).   Goals: Meet 85 % of needs  Nutrition Goal Status: new  Communication of RD Recs:  (care plan and sticky note)      Reason for Assessment    Reason for Assessment: NPO/clear liquids x 5 days  Diagnosis:  (SBO due to adhesions, MARCELA, esophagitis. 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 Pt tolerating diet (No N/V). Pt reports decreased appetite (PO intake less than 50 %). Pt reports no recent weight changes. Pt agreed to try oral supplements. Noted elevated /57.   Nutrition Discharge Planning: Home on general helthful  "diet     Nutrition Prescription Ordered    Current Diet Order: clear liquid         Evaluation of Received Nutrients/Fluid Intake     Energy Calories Required: not meeting needs  Protein Required: not meeting needs  Fluid Required: not meeting needs  Tolerance: tolerating  % Intake of Estimated Energy Needs: 0 - 25 %  % Meal Intake: 25%     Nutrition Risk Screen     Nutrition Risk Screen: no indicators present    Nutrition/Diet History       Typical Food/Fluid Intake: Good intake at home   Food Preferences: Pt dislikes blackmon flavor   Factors Affecting Nutritional Intake:  (decreased appetite, on Clear liquid diet )    Labs/Tests/Procedures/Meds       Pertinent Labs Reviewed: reviewed (/67)  Pertinent Labs Comments: K 3.4, Co2 21  Pertinent Medications Reviewed: reviewed       Physical Findings    Overall Physical Appearance: nourished  Tubes:  (NG decompression)  Oral/Mouth Cavity: dental applicance present (specify)  Skin:  (Álvaro Score 20 )    Anthropometrics    Temp: 98.5 °F (36.9 °C)  Height: 6' 1" (185.4 cm)  Weight Method: Stated  Weight: 90.7 kg (199 lb 15.3 oz)  Ideal Body Weight (IBW), Male: 184 lb  % Ideal Body Weight, Male (lb): 108.67 lb  BMI (Calculated): 26.4  BMI Grade: 25 - 29.9 - overweight     Estimated/Assessed Needs    Weight Used For Calorie Calculations: 88 kg (194 lb 0.1 oz) (Based on NHANES II per Renal function)   Height (cm): 185.4 cm  25 kcal/kg (kcal): 2200 and 30 kcal/kg (kcal): 2640   RMR ([place]-St. Jeor Equation): 1703.88  Weight Used For Protein Calculations: 88 kg (194 lb 0.1 oz) (Based on NHANES II per Renal )  0.8 gm Protein (gm): 70.55 and 1.2 gm Protein (gm): 105.82  Fluid Need Method: RDA Method (1ml/mercedes or as needed per MD)    Assessment and Plan    * Small bowel obstruction due to adhesions    Nutrition problem:   Inadequate energy intake    Related to (etiology):   Current medical condition    Signs and Symptoms (as evidenced by):   NG decompression, Currently on " clear liquid diet and PO intake less than 50 %.     Interventions/Recommendations (treatment strategy):  1. Advance diet as tolerated to Cardiac/ Soft GI. 2. Consider adding oral supplements with meals ~Boost breeze. 3. If patient does not tolerate oral diet which advancement to at least full liquids within 72 hrs.  Consider Nutrition Support ~ Clinimix 4.25/10 at 100 ml with 20 % 250 ml lipids daily. (1724 kcal, 102 g protein, 2400 ml, 1.83 mg/kg/min dextrose infusion rate).     Nutrition Diagnosis Status:   New              Monitor and Evaluation    Food and Nutrient Intake: energy intake  Food and Nutrient Adminstration: diet order  Anthropometric Measurements: weight  Biochemical Data, Medical Tests and Procedures: electrolyte and renal panel, glucose/endocrine profile  Nutrition-Focused Physical Findings: overall appearance    Nutrition Follow-Up    RD Follow-up?: Yes (2xweekly)

## 2017-08-16 NOTE — ASSESSMENT & PLAN NOTE
- Neurology following   - Unable to get MRI due to claustrophobia  - CT head with and without today   - CTA head  - ECHO pending

## 2017-08-16 NOTE — PROGRESS NOTES
"[facility] Hospital Medicine  Progress Note    Patient Name: Kodi Ribeiro  MRN: [id number]  Patient Class: IP- Inpatient   Admission Date: 8/11/2017  Length of Stay: 5 days  Attending Physician: Karla Hutton MD  Primary Care Provider: Norma Nuñez MD        Subjective:     Principal Problem:Small bowel obstruction due to adhesions    HPI:  Mr. Ribeiro is a 68 y/o  male with h/o CAD, PAD, cardiac stents, CKD3, HTN, right BKA, AAA repair complicated by SBO, requiring surgery, left ureteral stent placement, presents to the ED c/o abdominal discomfort, nausea and vomiting since yesterday morning 1 AM. He thought it was "stomach bug" and would pass, but continued to get worse, hence presented to the ED. Last BM 3 days ago (wednesday).    X-Ray abdomen shows "several air-fluid levels within the abdomen.  There are dilated small bowel loops."    CT abdomen reveals "Mid to proximal small bowel loops are dilated, with marked distention of the stomach as well. Contrast noted in the distal esophagus, likely related to reflux. Distal small bowel loops are decompressed, with transition point noted at the lower midline retroperitoneum, at the site of prior surgery".     was contacted who will admit the patient. [facility] was consulted for medical management. Patient just had NG tube placed.    In the ED today, patient had coffee-ground emesis in the ED.  was consulted who recommend supportive care at this time.      Hospital Course:  8/12/17 No acute issues overnight. Pt reports pain is improved. Continue NG decompression. 8/13/17 Pt reports that he had " a really good bowel movement this morning". Pt reports improvement in symptoms. Continue NG compression. No current issues or complaints. ABD xray this morning showed increasing distention compared to prior exam. 8/14/17 The patients NG tube inadvertently came out overnight. Ok per primary team to leave it out. Pts last BM was yesterday, " pt is passing flatus. No current complaints. 8/15/17 The patient began complaining of RUE numbness yesterday. On exam strength was equal bilaterally. Head CT showed a vague 1 cm area of hypodensity in the white matter of the left centrum semiovale. Will obtain an MRI of the brain today and consult Neurology. 8/16/17 Pt unable to have MRI yesterday due to claustrophobia. Neurology recommends doing a CT head w wo and CTA head to view intracranial vessels. Carotid US showed 90-99% stenosis within the left internal carotid artery. Vascular Surgery consulted.     Interval History: Pt unable to have MRI yesterday due to claustrophobia. Neurology recommends doing a CT head w wo and CTA head to view intracranial vessels. Carotid US showed 90-99% stenosis within the left internal carotid artery. Vascular Surgery consulted.       Review of Systems   Constitutional: Negative.  Negative for activity change, appetite change, chills, fatigue, fever and unexpected weight change.   HENT: Negative.  Negative for congestion, facial swelling, nosebleeds, rhinorrhea, sinus pressure, sneezing and sore throat.    Eyes: Negative.  Negative for photophobia, discharge, redness and visual disturbance.   Respiratory: Negative.  Negative for apnea, cough, chest tightness, shortness of breath, wheezing and stridor.    Cardiovascular: Negative.  Negative for chest pain, palpitations and leg swelling.   Gastrointestinal: Positive for abdominal pain (discomfort), nausea and vomiting. Negative for abdominal distention, anal bleeding, blood in stool, constipation and diarrhea.   Endocrine: Negative.  Negative for polydipsia, polyphagia and polyuria.   Genitourinary: Negative.  Negative for difficulty urinating, discharge, dysuria, flank pain, frequency, hematuria and urgency.   Musculoskeletal: Negative.  Negative for arthralgias, back pain, gait problem, joint swelling, myalgias, neck pain and neck stiffness.   Skin: Negative.  Negative for color  change, pallor and rash.   Allergic/Immunologic: Negative.  Negative for environmental allergies, food allergies and immunocompromised state.   Neurological: Positive for numbness. Negative for dizziness, tremors, seizures, syncope, facial asymmetry, speech difficulty, weakness, light-headedness and headaches.        LUE numbness   Hematological: Negative.    Psychiatric/Behavioral: Negative.  Negative for behavioral problems, confusion, hallucinations and suicidal ideas. The patient is not nervous/anxious.    All other systems reviewed and are negative.    Objective:     Vital Signs (Most Recent):  Temp: 98.5 °F (36.9 °C) (08/16/17 1205)  Pulse: 74 (08/16/17 1205)  Resp: 18 (08/16/17 1205)  BP: (!) 152/67 (08/16/17 1205)  SpO2: 95 % (08/16/17 1205) Vital Signs (24h Range):  Temp:  [98 °F (36.7 °C)-98.6 °F (37 °C)] 98.5 °F (36.9 °C)  Pulse:  [] 74  Resp:  [18] 18  SpO2:  [95 %-98 %] 95 %  BP: (144-169)/(67-76) 152/67     Weight: 90.7 kg (200 lb)  Body mass index is 26.39 kg/m².    Intake/Output Summary (Last 24 hours) at 08/16/17 1233  Last data filed at 08/16/17 1152   Gross per 24 hour   Intake          4813.33 ml   Output             1400 ml   Net          3413.33 ml      Physical Exam   Constitutional: He is oriented to person, place, and time. He appears well-developed and well-nourished. No distress.   Appears uncomfortable.   HENT:   Head: Normocephalic and atraumatic.   Eyes: Conjunctivae and EOM are normal. Pupils are equal, round, and reactive to light. No scleral icterus.   Neck: Normal range of motion. Neck supple. No thyromegaly present.   Cardiovascular: Normal rate, regular rhythm, normal heart sounds and intact distal pulses.    No murmur heard.  Pulmonary/Chest: Effort normal and breath sounds normal. No respiratory distress. He has no wheezes. He exhibits no tenderness.   Abdominal: Soft. He exhibits no distension. Bowel sounds are decreased. There is no tenderness.   Musculoskeletal: Normal  range of motion. He exhibits no edema, tenderness or deformity.   Right BKA, left foot amputation   Lymphadenopathy:     He has no cervical adenopathy.   Neurological: He is alert and oriented to person, place, and time. No cranial nerve deficit. He exhibits normal muscle tone. Coordination normal.   Patient is alert and oriented to person, place and time. Pupils ERRL and EOM normal. No cranial nerve deficit and cranial nerves II-XII are intact. Strength is full bilaterally; it is equal and 5/5 in bilateral upper and lower extremities. There is no pronator drift of outstretched arms. Light touch sense is intact. Speech is clear and normal. DTRs are normal and symmetric; they are 2+ and equal bilaterally. No acute focal neurological deficits noted.      Skin: Skin is warm and dry. No rash noted. He is not diaphoretic. No erythema.   Psychiatric: He has a normal mood and affect. His behavior is normal. Judgment and thought content normal.   Nursing note and vitals reviewed.      Significant Labs: All pertinent labs within the past 24 hours have been reviewed.    Significant Imaging:   Imaging Results          US Carotid Bilateral (Final result)     Abnormal  Result time 08/16/17 12:08:20    Final result by Aurelio Lau MD (08/16/17 12:08:20)                 Impression:        90-99% stenosis within the left internal carotid artery. The patient's nurse was notified at 12 PM on 8/16/17.    **Categories of stenosis (0-15%, 16-49%, 50-69% and 70-99% ) are based on published criteria that have been internally validated.  Degree of stenosis is based on NASCET criteria and refers to the degree of luminal diameter narrowing as a percentage of the normal ICA distal to the stenosis and any area of post stenotic dilation.        Electronically signed by: AURELIO LAU MD  Date:     08/16/17  Time:    12:08              Narrative:    BILATERAL CAROTID DUPLEX ULTRASOUND.    Comparison: None    History:  Stroke    FINDINGS:      Right common carotid:   Peak systolic velocity 155 cm/sec.    Right internal carotid artery: Mild plaque is seen in the bulb    Peak systolic velocity: 123 cm/sec.   IC/CC ratio:  0.8.    Left common carotid:   Peak systolic velocity 67 cm/sec.    Left internal carotid artery: Significant plaque is seen    Peak systolic velocity 582  cm/sec.    IC/CC ratio 8.6.    Both vertebral arteries demonstrate antegrade flow.                             X-Ray Abdomen Flat And Erect (Final result)  Result time 08/15/17 11:37:29    Final result by RADHA Medellin Sr., MD (08/15/17 11:37:29)                 Impression:      1. There are dilated loops of small bowel. In the expected location of the mid to distal portion of the ileum there is a dilated loop of small bowel with a diameter of 4.1 cm. On the prior examination it measured 4.4 cm. This is consistent with the patient's history of a small bowel obstruction.  2. There is a suspected compression fracture of the L1 vertebral body.  3. Surgical changes      Electronically signed by: RADHA MEDELLIN MD  Date:     08/15/17  Time:    11:37              Narrative:    Flat and erect KUB    History: small bowel obstruction; resolving    Finding: Comparison was made to a prior examination performed on 8/13/2017. There are dilated loops of small bowel. In the expected location of the mid to distal portion of the ileum there is a dilated loop of small bowel with a diameter of 4.1 cm. On the prior examination it measured 4.4 cm. There is no pneumoperitoneum. There is a suspected compression fracture of the L1 vertebral body. There are surgical clips projected over the abdomen and pelvis. There is a left ureteral catheter again visualized. There is partial visualization of a vascular stent in the expected location of the left superficial femoral artery.                             CT Head Without Contrast (Final result)  Result time 08/14/17 18:12:27    Final result by Dwayne Nowak  MD Luan (08/14/17 18:12:27)                 Impression:     Small subtle zone of hypodensity in the left centrum semiovale white matter, with possibilities as above.  No acute hemorrhage is noted.      All CT scans at this facility use dose modulation, iterative reconstruction, and/or weight based dosing when appropriate to reduce radiation dose to as low as reasonably achievable.        Electronically signed by: RACHID READ MD  Date:     08/14/17  Time:    18:12              Narrative:    Exam: CT HEAD WITHOUT CONTRAST    History:  Right upper extremity numbness/weakness      Technique: Noncontrast axial images of the head were obtained.  Motion artifact.  A satisfactory exam was acquired.    Comparison:None    Findings: Vague 1 cm area of hypodensity in the white matter of the left centrum semiovale, image 20 series 5.  Possibilities include an area of microvascular ischemic change or subacute lacunar ischemic infarct.  Ventricular system is normal.  No hydrocephalus.  No midline shift.  No abnormal density to indicate acute major vascular distribution ischemic infarction or hemorrhage.  No mass effect.  No extra-axial fluid collections.     Visualized paranasal sinuses and mastoid air cells appear clear.      No calvarial fracture.                             X-Ray Abdomen Flat And Erect (Final result)  Result time 08/13/17 09:56:39    Final result by Freddie Nelson MD (08/13/17 09:56:39)                 Impression:     Small bowel obstruction.  Increasing distention compared to prior exam.      Electronically signed by: FREDDIE NELSON MD  Date:     08/13/17  Time:    09:56              Narrative:    PROCEDURE: XR ABDOMEN FLAT AND ERECT, 08/13/17 08:43:02    HISTORY:  small bowel obstruction    COMPARISON STUDIES: August 11, 2017    FINDINGS:   Diffusely dilated small bowel loops throughout the abdomen with maximum diameter of approximately 4.4 cm.  Scattered surgical clips.  Small amounts of air in the colon  with some contrast in the lower left colon.  Nasogastric tube present.  left sided nephroureteral stent.                             X-Ray Chest 1 View (Final result)  Result time 08/11/17 19:56:15    Final result by Stefan Gagnon MD (08/11/17 19:56:15)                 Impression:     Tip of the NG tube not visualized. See above.      Electronically signed by: STEFAN GAGNON MD  Date:     08/11/17  Time:    19:56              Narrative:    Exam: Chest X-ray, one view.    History: Encounter for fitting and adjustment of other gastrointestinal appliance and device    Findings: Comparison is made to prior examination at 1536 hrs. NG tube has been placed, extending into the distal esophagus, tip not visualized. Consider AP view of the abdomen for further assessment.                             CT Abdomen Pelvis  Without Contrast (Final result)  Result time 08/11/17 18:38:49   Procedure changed from CT Abdomen Pelvis With Contrast     Final result by Stefan Gagnon MD (08/11/17 18:38:49)                 Impression:     Mid small bowel obstruction, likely related to postoperative adhesions. See above. No free air or pneumatosis identified.      Electronically signed by: STEFAN GAGNON MD  Date:     08/11/17  Time:    18:38              Narrative:    Examination: CT of the abdomen and pelvis without contrast.    History: Vomiting    Findings: Comparison is made to prior examination dated 03/20/2000 1534 she kidney again noted. Left ureteral stent remains in place. Postoperative changes again noted in the midline retroperitoneum.    Mid to proximal small bowel loops are dilated, with marked distention of the stomach as well. Contrast noted in the distal esophagus, likely related to reflux. Distal small bowel loops are decompressed, with transition point noted at the lower midline retroperitoneum, at the site of prior surgery. No free air or pneumatosis, and otherwise no significant change from prior exam.                              X-Ray Abdomen Flat And Erect (Final result)  Result time 08/11/17 15:53:16    Final result by Hill Mario MD (08/11/17 15:53:16)                 Impression:      Bowel gas pattern may be due to an ileus.  Small bowel obstruction not excluded.    Other findings as described above.      Electronically signed by: HILL MARIO MD  Date:     08/11/17  Time:    15:53              Narrative:    EXAM:  2 view abdomen, flat and erect    CLINICAL HISTORY: abdominal pain, unspecified without report of trauma    COMPARISON STUDIES: Abdominal series 04/22/2016    FINDINGS:    Abdomen: No definite free air is seen. Several surgical clips within the midabdomen.  Left ureteral stent.  atherosclerosis.    There are several air-fluid levels within the abdomen.  There are dilated small bowel loops..                             X-Ray Chest PA And Lateral (Final result)  Result time 08/11/17 15:51:11    Final result by Hill Mario MD (08/11/17 15:51:11)                 Impression:      No acute cardiopulmonary findings.  Please see above      Electronically signed by: HILL MARIO MD  Date:     08/11/17  Time:    15:51              Narrative:    EXAM: Chest X-ray PA and Lateral    CLINICAL HISTORY:     Vomiting, unspecified    COMPARISON STUDIES:     04/24/2017    FINDINGS:    Surgical clips in the right axilla.  There are surgical staples in the right lung.  Chronic blunting of the right costophrenic sulcus.  Left lung appears clear.  Normal heart size.. Chronic right rib deformities probably from previous thoracotomy.  Coronary artery stent.  Chronic compression of one of the midthoracic vertebral bodies.  Surgical staples and a ureteral stent is noted on the lateral view..                                 Assessment/Plan:      * Small bowel obstruction due to adhesions    - General Surgery following   - Supportive care  - IV fluids  - Pt reports large BM yesterday, passing flatus  - Clear liquid diet          Left carotid artery stenosis    - Carotid US showed 90-99% stenosis within the left internal carotid artery.   - Vascular Surgery consulted.           Cerebral infarction due to embolism of left middle cerebral artery    - Neurology following   - Unable to get MRI due to claustrophobia  - CT head with and without today   - CTA head  - ECHO pending           Right upper extremity numbness    - CT head showed 1 cm area of hypodensity in the white matter of the left centrum semiovale  - Unable to do MRI brain   - Neurology consult   - PT/OT eval  - resume statin/plavix  - CT head with and without today   - CTA head  - ECHO pending             Hypokalemia    - K+ 3.4  - replete          Coffee ground emesis    - Likely due to persistent vomiting.  - GI consulted, Dr. Tanner aware  - Protonix IV daily for now.  - monitor H/H        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    - H/o Right BKA          CAD;Old MI ; s/p stents x 3 (2000)     - Resume statin/plavix  - Denies chest pain or SOB at this time.          CKD (chronic kidney disease) stage 3, GFR 30-59 ml/min    - Serum creatinine at baseline  - Gentle IV hydration  - Monitor, labs in AM          Essential hypertension    - Hydralazine IV prn  - Resume oral meds  - Monitor and adjust as needed            VTE Risk Mitigation         Ordered     Medium Risk of VTE  Once      08/11/17 2210     Place sequential compression device  Until discontinued      08/11/17 2210     Reason for No Pharmacological VTE Prophylaxis  Once      08/11/17 2210     Place sequential compression device  Until discontinued      08/11/17 2210              Brennen Weiner NP  Department of Hospital Medicine   [facility]

## 2017-08-16 NOTE — CONSULTS
"Consultation  VASCULAR/VSC  Consultant: Patti  Ref: [facility]    CC: right arm weakness    Patient Name: Kodi Ribeiro  MRN: [id number]  Admission Date: 8/11/2017  Hospital Length of Stay: 4 days  Code Status: Full Code   Attending Provider: Ta Cook MD   Consulting Provider: Desmond Davidson MD  Primary Care Physician: Norma Nuñez MD  Principal Problem:Small bowel obstruction due to adhesions     Consults  Subjective:      Chief Complaint:  Weak and awkward right arm and hand      HPI: The patient states that while he was having repeated emesis and retching, he became aware of a "funny feeling" in the right hand and arm, indicating that he could not control the movement well of the arm and hand.  He first noted the difficulty while at home, prior to admission to the hospital for his apparent bowel obstruction.  He did not notice any facial weakness, speech difficulty or weakness of the right leg.  He denies any change in vision such as vision loss or diplopia.     As his emesis began to resolve, he became much more aware of the right hand weakness and awkwardness of movements.  He has now had CT scan brain done which is abnormal.  MRI was ordered, but the patient is severely claustrophobic and states that he cannot do MRI unless totally sedated.          Past Medical History:   Diagnosis Date    Anemia      Arthritis      Colon polyp       Repeat colonoscopy due in 9/14    Coronary artery disease      Diverticulosis       colonoscopy 2/21/2014    GERD (gastroesophageal reflux disease)      Hemorrhoids       colonoscopy 2/21/2014    Horseshoe kidney      Hyperglycemia 3/17/2014    Hyperlipidemia      Hypertension      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    Late complications of amputation stump       rseolved with further amputation( MRSA then none since 2014)    Lipoma of colon       colonoscopy 2/21/2014    Myocardial infarction       per patient 2000 & 9/2012    Peripheral vascular disease   "    Phantom limb syndrome       patient reports only intermittent not problematic, not worsening    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    Spinal cord disease       L4L5 disc    Tobacco dependence       resolved    Ureteral stent retained                 Past Surgical History:   Procedure Laterality Date    ABDOMINAL AORTIC ANEURYSM REPAIR        ABDOMINAL AORTIC ANEURYSM REPAIR   1996/2014    AMPUTATION        AORTA - BILATERAL FEMORAL ARTERY BYPASS GRAFT   2014     Left and right leg    CORONARY ANGIOPLASTY WITH STENT PLACEMENT   2000     Three placed in heart    FOOT AMPUTATION THROUGH METATARSAL   1996     left    FOOT SURGERY Bilateral 1980's     per patient multiple toe amputations prior to.  partial foot amputation:first great toe then other toes     KIDNEY SURGERY   2014     per patient separation of horseshoe kidney @ time of AAA repair    LUNG LOBECTOMY Right 1970s     per patient not cancer    right below knee amputation   2009 (approx)    SMALL INTESTINE SURGERY   2014     per patient partial @ time of aaa repair  not small bowel - large bowel bowel compromised bythtwe AAAbowel    TONSILLECTOMY   1955 aprox    URETERAL STENT PLACEMENT Left       annually replaced since 2012 or so  Dr Jin              Review of patient's allergies indicates:   Allergen Reactions    Morphine Itching         Current Neurological Medications: None     No current facility-administered medications on file prior to encounter.            Current Outpatient Prescriptions on File Prior to Encounter   Medication Sig    amlodipine (NORVASC) 10 MG tablet TAKE 1 TABLET ONE TIME DAILY    carvedilol (COREG) 6.25 MG tablet Take 1 tablet (6.25 mg total) by mouth 2 (two) times daily.    clopidogrel (PLAVIX) 75 mg tablet TAKE 1 TABLET ONE TIME DAILY    docusate sodium (COLACE) 100 MG capsule Take 1 capsule (100 mg total) by mouth 2 (two) times daily.    losartan (COZAAR) 100 MG tablet Take 1 tablet (100  mg total) by mouth once daily.    omeprazole (PRILOSEC) 20 MG capsule Take 1 capsule (20 mg total) by mouth 2 (two) times daily.    oxycodone-acetaminophen (PERCOCET) 5-325 mg per tablet Take 1-2 tablets by mouth every 4 (four) hours as needed for Pain.    pravastatin (PRAVACHOL) 80 MG tablet Take 1 tablet (80 mg total) by mouth every evening.    triamcinolone acetonide 0.025% (KENALOG) 0.025 % cream Apply topically 2 (two) times daily.      Family History      Problem Relation (Age of Onset)     COPD Mother     Cancer Mother     Diabetes Daughter     Heart disease Father                 Social History Main Topics    Smoking status: Former Smoker       Packs/day: 1.00       Years: 15.00       Quit date: 1/1/2009    Smokeless tobacco: Never Used    Alcohol use No    Drug use: No         Comment: Is on prescription opiod, no non prescribed use    Sexual activity: Not Currently       Partners: Female      Review of Systems      ROS:  GENERAL: No fever, chills, fatigability or weight loss.  SKIN: No rashes, itching or changes in color or texture of skin.  HEAD: See comments in present illness.  Denies recent head trauma.  EYES: Visual acuity fine. No photophobia, ocular pain or diplopia.  EARS: Denies ear pain, discharge or vertigo.  NOSE: No loss of smell, no epistaxis or postnasal drip.  MOUTH & THROAT: No hoarseness or change in voice. No excessive gum bleeding.  NODES: Denies swollen glands.  CHEST: Denies MACKEY, cyanosis, wheezing, cough and sputum production.  CARDIOVASCULAR: Denies chest pain, PND, orthopnea or reduced exercise tolerance.  ABDOMEN: Denies nausea or pain today.  Has been taking clear liquids without emesis.  URINARY: No flank pain, dysuria or hematuria.  PERIPHERAL VASCULAR: Prior right BK amputation for vascular insufficiency/infection  MUSCULOSKELETAL: See above        Objective:      Vital Signs (Most Recent):  Temp: 98.5 °F (36.9 °C) (08/15/17 1606)  Pulse: 100 (08/15/17 1606)  Resp: 18  (08/15/17 1606)  BP: (!) 149/70 (08/15/17 1606)  SpO2: 97 % (08/15/17 1606) Vital Signs (24h Range):  Temp:  [98.5 °F (36.9 °C)-98.9 °F (37.2 °C)] 98.5 °F (36.9 °C)  Pulse:  [] 100  Resp:  [18-22] 18  SpO2:  [94 %-98 %] 97 %  BP: (149-185)/(70-86) 149/70      Weight: 90.7 kg (200 lb)  Body mass index is 26.39 kg/m².     Physical Exam   APPEARANCE: Well nourished, well developed, in no acute distress.    HEAD: Normocephalic, atraumatic.  EYES: PERRL. EOMI.  Non-icteric sclerae.    EARS: TM's intact. Light reflex normal. No retraction or perforation.    NOSE: Mucosa pink. Airway clear.  MOUTH & THROAT: No tonsillar enlargement.    NECK: Supple. No bruits.  CHEST: Lungs clear to auscultation.  CARDIOVASCULAR: Regular rhythm without significant murmurs.  MUSCULOSKELETAL: Right BK amputation.  ABDOMEN:  Slightly distended.  Decrease BS.  NEUROLOGIC:   Mental Status:  The patient is well oriented to person, time, place, and situation.  The patient is attentive to the environment and cooperative for the exam.  Cranial Nerves: II-XII grossly intact. Fundoscopic exam is normal.  No hemorrhage, exudate or papilledema is present. The extraocular muscles are intact in the cardinal directions of gaze.  No ptosis is present. Facial features are symmetrical.  Speech is normal in fluency, diction, and phrasing.  Tongue protrudes in the midline.    Gait and Station:  He is confined to bed.  Motor:  The patient could not bring the right arm to shoulder level.  /wrist extension are 3/5 on the right.  Deltoid, biceps and triceps are 4/5 on the right and 5/5 on the left.  Right hip flexion is 5/5.  Sensory:  Intact both upper and lower extremities to pin prick, touch, and vibration.  Cerebellar:  Patient is unable to oppose the right thumb to the other fingers.  He has poor finger tapping on the right.  Right hand and arm are extremely awkward.  Reflexes:  Stretch reflexes are 2+ both biceps/triceps.  Plantar stimulation is  flexor on the left.                Significant Labs:   BMP:   Recent Labs  Lab 08/15/17  0843   GLU 97      K 3.1*      CO2 25   BUN 11   CREATININE 1.0   CALCIUM 8.9      CBC:   Recent Labs  Lab 08/14/17  0531   WBC 4.95   HGB 10.5*   HCT 31.6*   *      CMP:   Recent Labs  Lab 08/15/17  0843   GLU 97      K 3.1*      CO2 25   BUN 11   CREATININE 1.0   CALCIUM 8.9   ANIONGAP 12   EGFRNONAA >60      All pertinent lab results from the past 24 hours have been reviewed.     Significant Imaging: I have reviewed and interpreted all pertinent imaging results/findings within the past 24 hours.  CT scan brain shows decreased attenuation, left medial temporal/parietal lobe without mass effect or hemorrhage    Imp/Rec:  Seen/examined.  Known to [facility].  Previous AAA repair s/p redo-resection and ABF in 2014 by Eric Jamil and Hilda.  History of right bka.  Admitted with SBO.  Pt also noted right arm weakness x 1 week started during period of vomitting.  Abdmoinal pain now improving with conservative bowel rest and reports hand weakness only now starting to improve.    Workup suggests acute left hemisperic CVA and carotid duplex with significantly elevated velocities >500cm/sec.    Will need left CEA once workup completed and bowel issues resolved.  Timing of carotid revascularization TBD according to symptoms resolution and extent of CVA.    F/u CTA

## 2017-08-16 NOTE — ASSESSMENT & PLAN NOTE
- Carotid US showed 90-99% stenosis within the left internal carotid artery.   - Vascular Surgery consulted.

## 2017-08-16 NOTE — PT/OT/SLP PROGRESS
Physical Therapy  Treatment    Kodi Ribeiro   MRN: [id number]   Admitting Diagnosis: Small bowel obstruction due to adhesions    PT Received On: 08/16/17  PT Start Time: 0951     PT Stop Time: 1015    PT Total Time (min): 24 min       Billable Minutes:  Therapeutic Activity 14 and Therapeutic Exercise 10    Treatment Type: Treatment  PT/PTA: PT             General Precautions: Standard, fall  Orthopedic Precautions:     Braces:           Subjective:  Communicated with NURSE PEREZ AND EPIC CHART REVIEW prior to session.   PT AGREED TO TX    Pain/Comfort  Pain Rating 1: 0/10  Pain Rating Post-Intervention 1: 0/10    Objective:   Patient found with: peripheral IV    Functional Mobility:  Bed Mobility:   Rolling/Turning Right: Modified independent  Scooting/Bridging: Modified Independent  Supine to Sit: Modified Independent    Transfers:  Bed <> Chair Technique: Squat Pivot  Bed <> Chair Assistance: Minimum Assistance  Bed <> Chair Assistive Device: No Assistive Device    Gait:   Gait Distance: UNABLE AS PT DOESN'T HAVE PROSTHESIS     Therapeutic Activities and Exercises:  PT SEATED EOB AND SQUAT PIVOT T/F TO CHAIR WITH MIN A. PT SEATED IN CHAIR FOR B LE MIP TKE AND L LE AP. PT LEFT SEATED IN CHAIR AND REQUESTED WIFE TO BRING PROSTHESIS FOR TX TOMORROW.      AM-PAC 6 CLICK MOBILITY  How much help from another person does this patient currently need?   1 = Unable, Total/Dependent Assistance  2 = A lot, Maximum/Moderate Assistance  3 = A little, Minimum/Contact Guard/Supervision  4 = None, Modified Martin/Independent    Turning over in bed (including adjusting bedclothes, sheets and blankets)?: 4  Sitting down on and standing up from a chair with arms (e.g., wheelchair, bedside commode, etc.): 1  Moving from lying on back to sitting on the side of the bed?: 4  Moving to and from a bed to a chair (including a wheelchair)?: 3  Need to walk in hospital room?: 1  Climbing 3-5 steps with a railing?: 1  Total Score:  14    AM-PAC Raw Score CMS G-Code Modifier Level of Impairment Assistance   6 % Total / Unable   7 - 9 CM 80 - 100% Maximal Assist   10 - 14 CL 60 - 80% Moderate Assist   15 - 19 CK 40 - 60% Moderate Assist   20 - 22 CJ 20 - 40% Minimal Assist   23 CI 1-20% SBA / CGA   24 CH 0% Independent/ Mod I     Patient left UP IN CHAIR  with call button in reach and WIFE present.    Assessment:  PT YANETH TX WELL. PT LIMITED WITH PROGRESS D/T NO PROSTHESIS AVAILABLE.    Rehab identified problem list/impairments: Rehab identified problem list/impairments: weakness, impaired endurance, gait instability, impaired functional mobilty, impaired self care skills, impaired balance, decreased ROM, decreased lower extremity function, decreased upper extremity function    Rehab potential is good.    Activity tolerance: Fair    Discharge recommendations: Discharge Facility/Level Of Care Needs: rehabilitation facility, nursing facility, skilled     Barriers to discharge: Barriers to Discharge: None    Equipment recommendations: Equipment Needed After Discharge: none     GOALS:    Physical Therapy Goals        Problem: Physical Therapy Goal    Goal Priority Disciplines Outcome Goal Variances Interventions   Physical Therapy Goal     PT/OT, PT      Description:  PT WILL BE SEEN FOR P.T. FOR A MIN OF 5 OUT OF 7 DAYS A WEEK  LT17  1. PT WILL COMPLETE T/F TO CHAIR WITH S.  2. PT WILL GT TRAIN WITH PROTHESIS X 50' WITH MIN A WITH OR WITHOUT RW  3. PT WILL COMPLETE BED MOBILITY IND.                     PLAN:    Patient to be seen    to address the above listed problems via gait training, therapeutic activities, therapeutic exercises  Plan of Care expires: 17  Plan of Care reviewed with: patient         Lauren Doss, PT  2017

## 2017-08-16 NOTE — PLAN OF CARE
"Problem: Patient Care Overview  Goal: Plan of Care Review  PT REQUIRES MIN A FOR T/F TO CHAIR FROM BED.    Outcome: Ongoing (interventions implemented as appropriate)  Fall precautions maintained, pt free from injuries/fall, encouraged to reposition every 2 hours, uses overhead trapeze, ambulated with PT/OT, sat up on chair. Denies abdominal pain, does c/o back pain, pt encouraged to move, stretch, and reposition. Per pt, repositioning and sitting up helps. Remains clear. Still reports some numbness on right arm, but states "it's better than yesterday, I can feel more." POC and meds reviewed w/ pt and spouse, both verbalizes understanding. NSR on tele. Side rails x 2, bed locked and low, bed alarm on. Chart check done. Will cont to monitor.              "

## 2017-08-16 NOTE — PT/OT/SLP PROGRESS
Occupational Therapy  Treatment    Kodi Ribeiro   MRN: [id number]   Admitting Diagnosis: Small bowel obstruction due to adhesions    OT Date of Treatment: 08/16/17   OT Start Time: 1020  OT Stop Time: 1045  OT Total Time (min): 25 min    Billable Minutes:  Self Care/Home Management 10 minutes and Therapeutic Exercise 15 minutes    General Precautions: Standard, fall  Orthopedic Precautions: N/A  Braces:           Subjective:  Communicated with nurse Owusu and epic chart review prior to session.    Pain/Comfort  Pain Rating 1: 0/10    Objective:  Patient found with: peripheral IV     Functional Mobility:  Bed Mobility:  Rolling/Turning to Left: Modified independent  Rolling/Turning Right: Modified independent  Scooting/Bridging: Modified Independent  Supine to Sit: Modified Independent  Sit to Supine: Modified Independent    Transfers:   Sit <> Stand Assistance: Modified Independent  Sit <> Stand Assistive Device: Rolling Walker  Bed <> Chair Technique: Stand Pivot  Bed <> Chair Transfer Assistance: Modified Independent  Bed <> Chair Assistive Device: Rolling Walker    Functional Ambulation: na    Activities of Daily Living:     Feeding adaptive equipment: na     UE adaptive equipment: na  LE Dressing Level of Assistance: Stand by assistance  LE adaptive equipment: na  Grooming Position: Seated, bedside chair  Grooming Level of Assistance: Supervision              Bathing adaptive equipment: na    Balance:   Static Sit: GOOD: Takes MODERATE challenges from all directions  Dynamic Sit: GOOD: Maintains balance through MODERATE excursions of active trunk movement  Static Stand: na  Dynamic stand: na    Therapeutic Activities and Exercises:  Pt will performed 2 set x 10 reps b ue rom exercise with 1.5 dowel in all available planes and ranges seated in bedside chair.    AM-PAC 6 CLICK ADL   How much help from another person does this patient currently need?   1 = Unable, Total/Dependent Assistance  2 = A lot,  "Maximum/Moderate Assistance  3 = A little, Minimum/Contact Guard/Supervision  4 = None, Modified [place]/Independent    Putting on and taking off regular lower body clothing? : 3  Bathing (including washing, rinsing, drying)?: 3  Toileting, which includes using toilet, bedpan, or urinal? : 4  Putting on and taking off regular upper body clothing?: 4  Taking care of personal grooming such as brushing teeth?: 4  Eating meals?: 4  Total Score: 22     AM-PAC Raw Score CMS "G-Code Modifier Level of Impairment Assistance   6 % Total / Unable   7 - 8 CM 80 - 100% Maximal Assist   9-13 CL 60 - 80% Moderate Assist   14 - 19 CK 40 - 60% Moderate Assist   20 - 22 CJ 20 - 40% Minimal Assist   23 CI 1-20% SBA / CGA   24 CH 0% Independent/ Mod I       Patient left up in chair with all lines intact, call button in reach and nurse nurse kolby notified    ASSESSMENT:  Kodi Ribeiro is a 67 y.o. male with a medical diagnosis of Small bowel obstruction due to adhesions and presents with debility and  Generalized weakness. Pt will continue to benefit skilled O.T.    Rehab identified problem list/impairments: Rehab identified problem list/impairments: impaired self care skills, weakness, impaired balance, decreased coordination, decreased safety awareness, decreased ROM, impaired skin, impaired endurance, impaired functional mobilty, decreased upper extremity function, pain, gait instability, decreased lower extremity function    Rehab potential is good.    Activity tolerance: Fair    Discharge recommendations: Discharge Facility/Level Of Care Needs: rehabilitation facility, nursing facility, basic     Barriers to discharge: Barriers to Discharge: None    Equipment recommendations: none     GOALS:    Occupational Therapy Goals        Problem: Occupational Therapy Goal    Goal Priority Disciplines Outcome Interventions   Occupational Therapy Goal     OT, PT/OT Ongoing (interventions implemented as appropriate)  "   Description:  OT GOALS TO BE MET 8-22-17  1. S WITH UE DRESSING  2. S WITH LE DRESSING  3. PT WILL TOLERATE 1 SET X 15 REPS B UE ROM EXERCISE                    Plan:  Patient to be seen 3 x/week to address the above listed problems via self-care/home management, therapeutic activities, therapeutic exercises  Plan of Care expires:    Plan of Care reviewed with: patient         Radha Collazozier, OT  08/16/2017

## 2017-08-17 ENCOUNTER — ANESTHESIA EVENT (OUTPATIENT)
Dept: SURGERY | Facility: HOSPITAL | Age: 67
DRG: 981 | End: 2017-08-17
Payer: MEDICARE

## 2017-08-17 LAB
ANION GAP SERPL CALC-SCNC: 14 MMOL/L
BASOPHILS # BLD AUTO: 0.03 K/UL
BASOPHILS NFR BLD: 0.5 %
BUN SERPL-MCNC: 10 MG/DL
CALCIUM SERPL-MCNC: 8.6 MG/DL
CHLORIDE SERPL-SCNC: 104 MMOL/L
CO2 SERPL-SCNC: 19 MMOL/L
CREAT SERPL-MCNC: 1 MG/DL
DIFFERENTIAL METHOD: ABNORMAL
EOSINOPHIL # BLD AUTO: 0.3 K/UL
EOSINOPHIL NFR BLD: 4.1 %
ERYTHROCYTE [DISTWIDTH] IN BLOOD BY AUTOMATED COUNT: 13.6 %
EST. GFR  (AFRICAN AMERICAN): >60 ML/MIN/1.73 M^2
EST. GFR  (NON AFRICAN AMERICAN): >60 ML/MIN/1.73 M^2
GLUCOSE SERPL-MCNC: 85 MG/DL
HCT VFR BLD AUTO: 33.5 %
HGB BLD-MCNC: 11.4 G/DL
LYMPHOCYTES # BLD AUTO: 1.4 K/UL
LYMPHOCYTES NFR BLD: 21.5 %
MCH RBC QN AUTO: 30.2 PG
MCHC RBC AUTO-ENTMCNC: 34 G/DL
MCV RBC AUTO: 89 FL
MONOCYTES # BLD AUTO: 0.7 K/UL
MONOCYTES NFR BLD: 10.8 %
NEUTROPHILS # BLD AUTO: 4 K/UL
NEUTROPHILS NFR BLD: 63.1 %
PLATELET # BLD AUTO: 159 K/UL
PMV BLD AUTO: 9.6 FL
POTASSIUM SERPL-SCNC: 3.7 MMOL/L
RBC # BLD AUTO: 3.78 M/UL
SODIUM SERPL-SCNC: 137 MMOL/L
WBC # BLD AUTO: 6.36 K/UL

## 2017-08-17 PROCEDURE — 80048 BASIC METABOLIC PNL TOTAL CA: CPT

## 2017-08-17 PROCEDURE — 97530 THERAPEUTIC ACTIVITIES: CPT

## 2017-08-17 PROCEDURE — 97116 GAIT TRAINING THERAPY: CPT

## 2017-08-17 PROCEDURE — C9113 INJ PANTOPRAZOLE SODIUM, VIA: HCPCS | Performed by: EMERGENCY MEDICINE

## 2017-08-17 PROCEDURE — 25000003 PHARM REV CODE 250: Performed by: NURSE PRACTITIONER

## 2017-08-17 PROCEDURE — 85025 COMPLETE CBC W/AUTO DIFF WBC: CPT

## 2017-08-17 PROCEDURE — 99900035 HC TECH TIME PER 15 MIN (STAT)

## 2017-08-17 PROCEDURE — 86920 COMPATIBILITY TEST SPIN: CPT

## 2017-08-17 PROCEDURE — 63600175 PHARM REV CODE 636 W HCPCS: Performed by: EMERGENCY MEDICINE

## 2017-08-17 PROCEDURE — 21400001 HC TELEMETRY ROOM

## 2017-08-17 PROCEDURE — 97110 THERAPEUTIC EXERCISES: CPT

## 2017-08-17 PROCEDURE — 25000003 PHARM REV CODE 250: Performed by: INTERNAL MEDICINE

## 2017-08-17 PROCEDURE — 36415 COLL VENOUS BLD VENIPUNCTURE: CPT

## 2017-08-17 RX ORDER — HYDROCODONE BITARTRATE AND ACETAMINOPHEN 500; 5 MG/1; MG/1
TABLET ORAL
Status: DISCONTINUED | OUTPATIENT
Start: 2017-08-18 | End: 2017-08-21 | Stop reason: HOSPADM

## 2017-08-17 RX ADMIN — PANTOPRAZOLE SODIUM 40 MG: 40 INJECTION, POWDER, FOR SOLUTION INTRAVENOUS at 08:08

## 2017-08-17 RX ADMIN — CARVEDILOL 6.25 MG: 6.25 TABLET, FILM COATED ORAL at 05:08

## 2017-08-17 RX ADMIN — CARVEDILOL 6.25 MG: 6.25 TABLET, FILM COATED ORAL at 09:08

## 2017-08-17 RX ADMIN — CLOPIDOGREL BISULFATE 75 MG: 75 TABLET ORAL at 09:08

## 2017-08-17 RX ADMIN — AMLODIPINE BESYLATE 10 MG: 10 TABLET ORAL at 09:08

## 2017-08-17 RX ADMIN — LOSARTAN POTASSIUM 100 MG: 50 TABLET, FILM COATED ORAL at 09:08

## 2017-08-17 RX ADMIN — PRAVASTATIN SODIUM 80 MG: 20 TABLET ORAL at 08:08

## 2017-08-17 RX ADMIN — PANTOPRAZOLE SODIUM 40 MG: 40 INJECTION, POWDER, FOR SOLUTION INTRAVENOUS at 11:08

## 2017-08-17 NOTE — PHYSICIAN QUERY
"PT Name: Kodi Ribeiro  MR #: [id number]    Physician Query Form -Present on Admission (POA) Diagnosis Clarification     CDS/: Mirela Quick RN               Contact information: [email or web address]    This form is a permanent document in the medical record.     Query Date: August 17, 2017    By submitting this query, we are merely seeking further clarification of documentation. Please utilize your independent clinical judgment when addressing the question(s) below.       The Medical record contains the following:    Diagnosis      Supporting Clinical Information   Location in Medical Record   Cerebral infarction due to embolism of left middle cerebral artery        Neurological: Negative for weakness.      Negative for dizziness, tremors, seizures, syncope, facial asymmetry, speech difficulty, weakness, light-headedness, numbness and headaches.Neurological: He is alert and oriented to person, place, and time. No cranial nerve deficit. He exhibits normal  muscle tone. Coordination normal     8/15/17 The patient began complaining of RUE numbness yesterday. On exam strength was equal bilaterally. Head CT showed a vague 1 cm area of hypodensity in the white matter of the left centrum  semiovale    The patient states that while he was having repeated emesis and retching, he became aware of a "funny feeling" in the right hand and arm, indicating that he could not control the movement  well of the arm and hand.  He first noted the difficulty while at home, prior to admission to the hospital   Patient is unable to oppose the right thumb to the other fingers.  He has poor finger tapping on the right.  Right hand and arm are extremely awkward. Stroke, left MCA branches       8/16/17 Pt unable to have MRI yesterday due to claustrophobia. Carotid US showed 90-99% stenosis  within the left internal carotid artery  Cerebral infarction due to embolism of left middle cerebral artery     Workup suggests acute left " hemisperic CVA and carotid duplex with significantly elevated velocities. Will need left CEA     8/11 ED provider note      8/12@1:49p Hospital Medicine PN                          8/15 @ 1205p [facility] Medicine PN                   8/15@4:24p Neurology consult                                        8/16@1242 [facility] Medicine PN                  8/16@1:27p Vascular consult               Doctor, Please specify Present On Admission (POA) status of Cerebral infarction due to embolism of left middle cerebral artery .    [  ] Present on Admission   [  ] Not Present on Admission  [ x ] Clinically undetermined

## 2017-08-17 NOTE — SUBJECTIVE & OBJECTIVE
Interval History: RUE numbness resolving. CTA head showed calcific atherosclerotic plaque of the bilateral cavernous internal carotid arteries with focal moderate to high grade stenoses at the supraclinoid ICAs bilaterally. Neurology following. Vascular following plans on intervention for left internal artery stenosis at some point. Upon discharge the patient will go to [facility]. Pt continuing to have bowel movements.       Review of Systems   Constitutional: Negative.  Negative for activity change, appetite change, chills, fatigue, fever and unexpected weight change.   HENT: Negative.  Negative for congestion, facial swelling, nosebleeds, rhinorrhea, sinus pressure, sneezing and sore throat.    Eyes: Negative.  Negative for photophobia, discharge, redness and visual disturbance.   Respiratory: Negative.  Negative for apnea, cough, chest tightness, shortness of breath, wheezing and stridor.    Cardiovascular: Negative.  Negative for chest pain, palpitations and leg swelling.   Gastrointestinal: Positive for abdominal pain (discomfort), nausea and vomiting. Negative for abdominal distention, anal bleeding, blood in stool, constipation and diarrhea.   Endocrine: Negative.  Negative for polydipsia, polyphagia and polyuria.   Genitourinary: Negative.  Negative for difficulty urinating, discharge, dysuria, flank pain, frequency, hematuria and urgency.   Musculoskeletal: Negative.  Negative for arthralgias, back pain, gait problem, joint swelling, myalgias, neck pain and neck stiffness.   Skin: Negative.  Negative for color change, pallor and rash.   Allergic/Immunologic: Negative.  Negative for environmental allergies, food allergies and immunocompromised state.   Neurological: Positive for numbness. Negative for dizziness, tremors, seizures, syncope, facial asymmetry, speech difficulty, weakness, light-headedness and headaches.        LUE numbness   Hematological: Negative.    Psychiatric/Behavioral: Negative.   Negative for behavioral problems, confusion, hallucinations and suicidal ideas. The patient is not nervous/anxious.    All other systems reviewed and are negative.    Objective:     Vital Signs (Most Recent):  Temp: 97.6 °F (36.4 °C) (08/17/17 1548)  Pulse: 77 (08/17/17 1548)  Resp: 18 (08/17/17 1548)  BP: (!) 163/78 (08/17/17 1548)  SpO2: 96 % (08/17/17 1548) Vital Signs (24h Range):  Temp:  [97.5 °F (36.4 °C)-98.5 °F (36.9 °C)] 97.6 °F (36.4 °C)  Pulse:  [70-82] 77  Resp:  [18] 18  SpO2:  [94 %-97 %] 96 %  BP: (129-172)/(63-81) 163/78     Weight: 90.7 kg (199 lb 15.3 oz)  Body mass index is 26.38 kg/m².    Intake/Output Summary (Last 24 hours) at 08/17/17 1639  Last data filed at 08/17/17 0845   Gross per 24 hour   Intake          2506.67 ml   Output             1900 ml   Net           606.67 ml      Physical Exam   Constitutional: He is oriented to person, place, and time. He appears well-developed and well-nourished. No distress.   Appears uncomfortable.   HENT:   Head: Normocephalic and atraumatic.   Eyes: Conjunctivae and EOM are normal. Pupils are equal, round, and reactive to light. No scleral icterus.   Neck: Normal range of motion. Neck supple. No thyromegaly present.   Cardiovascular: Normal rate, regular rhythm, normal heart sounds and intact distal pulses.    No murmur heard.  Pulmonary/Chest: Effort normal and breath sounds normal. No respiratory distress. He has no wheezes. He exhibits no tenderness.   Abdominal: Soft. He exhibits no distension. Bowel sounds are decreased. There is no tenderness.   Musculoskeletal: Normal range of motion. He exhibits no edema, tenderness or deformity.   Right BKA, left foot amputation   Lymphadenopathy:     He has no cervical adenopathy.   Neurological: He is alert and oriented to person, place, and time. No cranial nerve deficit. He exhibits normal muscle tone. Coordination normal.   Patient is alert and oriented to person, place and time. Pupils ERRL and EOM normal.  No cranial nerve deficit and cranial nerves II-XII are intact. Strength is full bilaterally; it is equal and 5/5 in bilateral upper and lower extremities. There is no pronator drift of outstretched arms. Light touch sense is intact. Speech is clear and normal. DTRs are normal and symmetric; they are 2+ and equal bilaterally. No acute focal neurological deficits noted.      Skin: Skin is warm and dry. No rash noted. He is not diaphoretic. No erythema.   Psychiatric: He has a normal mood and affect. His behavior is normal. Judgment and thought content normal.   Nursing note and vitals reviewed.      Significant Labs: All pertinent labs within the past 24 hours have been reviewed.    Significant Imaging:   Imaging Results          CTA Head (Final result)  Result time 08/16/17 18:43:23    Final result by Will Nelson MD (08/16/17 18:43:23)                 Impression:         1.  No evidence for intracranial aneurysm or AVM.  2.  Calcific atherosclerotic plaque of the bilateral cavernous internal carotid arteries with focal moderate to high grade stenoses at the supraclinoid ICAs bilaterally.  3.  No emboli or filling defects or thrombus seen in the intracranial arterial circulation.    All CT scans at this facility use dose modulation, iterative reconstruction and/or weight based dosing when appropriate to reduce radiation dose to as low as reasonably achievable.      Electronically signed by: WILL NELSON MD  Date:     08/16/17  Time:    18:43              Narrative:    Exam: CT angiogram of the head with and without intravenous contrast    Clinical History:  CVA. Right upper extremity weakness.     Technique: The head was scanned both before and after the intravenous administration of 75 cc of Isovue 370..     Findings:    No hydrocephalus, midline shift, mass effect, or acute intracranial hemorrhage. The visualized paranasal sinuses and mastoid air cells are clear. The skull is intact.    The angiogram shows no  intracranial aneurysm or AVM.  There is no abnormal intracranial enhancement with contrast administration.  There is no arterial occlusion or truncation or filling defect.  There are bilateral calcifications of the cavernous ICAs with a focal moderate to high grade stenosis at the supraclinoid right ICA.  There is a focal moderate to high grade stenosis of the supraclinoid left ICA from the calcific plaque.  No focal stenosis is seen in the posterior circulation.  No evidence for vasculitis.  The bilateral internal cerebral veins and dural venous sinuses are patent.                             US Carotid Bilateral (Final result)     Abnormal  Result time 08/16/17 12:08:20    Final result by Aurelio Lau MD (08/16/17 12:08:20)                 Impression:        90-99% stenosis within the left internal carotid artery. The patient's nurse was notified at 12 PM on 8/16/17.    **Categories of stenosis (0-15%, 16-49%, 50-69% and 70-99% ) are based on published criteria that have been internally validated.  Degree of stenosis is based on NASCET criteria and refers to the degree of luminal diameter narrowing as a percentage of the normal ICA distal to the stenosis and any area of post stenotic dilation.        Electronically signed by: AURELIO LAU MD  Date:     08/16/17  Time:    12:08              Narrative:    BILATERAL CAROTID DUPLEX ULTRASOUND.    Comparison: None    History:  Stroke    FINDINGS:     Right common carotid:   Peak systolic velocity 155 cm/sec.    Right internal carotid artery: Mild plaque is seen in the bulb    Peak systolic velocity: 123 cm/sec.   IC/CC ratio:  0.8.    Left common carotid:   Peak systolic velocity 67 cm/sec.    Left internal carotid artery: Significant plaque is seen    Peak systolic velocity 582  cm/sec.    IC/CC ratio 8.6.    Both vertebral arteries demonstrate antegrade flow.                             X-Ray Abdomen Flat And Erect (Final result)  Result time 08/15/17 11:37:29     Final result by RADHA Medellin Sr., MD (08/15/17 11:37:29)                 Impression:      1. There are dilated loops of small bowel. In the expected location of the mid to distal portion of the ileum there is a dilated loop of small bowel with a diameter of 4.1 cm. On the prior examination it measured 4.4 cm. This is consistent with the patient's history of a small bowel obstruction.  2. There is a suspected compression fracture of the L1 vertebral body.  3. Surgical changes      Electronically signed by: RADHA MEDELLIN MD  Date:     08/15/17  Time:    11:37              Narrative:    Flat and erect KUB    History: small bowel obstruction; resolving    Finding: Comparison was made to a prior examination performed on 8/13/2017. There are dilated loops of small bowel. In the expected location of the mid to distal portion of the ileum there is a dilated loop of small bowel with a diameter of 4.1 cm. On the prior examination it measured 4.4 cm. There is no pneumoperitoneum. There is a suspected compression fracture of the L1 vertebral body. There are surgical clips projected over the abdomen and pelvis. There is a left ureteral catheter again visualized. There is partial visualization of a vascular stent in the expected location of the left superficial femoral artery.                             CT Head Without Contrast (Final result)  Result time 08/14/17 18:12:27    Final result by Dwayne Nowak Jr., MD (08/14/17 18:12:27)                 Impression:     Small subtle zone of hypodensity in the left centrum semiovale white matter, with possibilities as above.  No acute hemorrhage is noted.      All CT scans at this facility use dose modulation, iterative reconstruction, and/or weight based dosing when appropriate to reduce radiation dose to as low as reasonably achievable.        Electronically signed by: DWAYNE NOWAK MD  Date:     08/14/17  Time:    18:12              Narrative:    Exam: CT HEAD WITHOUT  CONTRAST    History:  Right upper extremity numbness/weakness      Technique: Noncontrast axial images of the head were obtained.  Motion artifact.  A satisfactory exam was acquired.    Comparison:None    Findings: Vague 1 cm area of hypodensity in the white matter of the left centrum semiovale, image 20 series 5.  Possibilities include an area of microvascular ischemic change or subacute lacunar ischemic infarct.  Ventricular system is normal.  No hydrocephalus.  No midline shift.  No abnormal density to indicate acute major vascular distribution ischemic infarction or hemorrhage.  No mass effect.  No extra-axial fluid collections.     Visualized paranasal sinuses and mastoid air cells appear clear.      No calvarial fracture.                             X-Ray Abdomen Flat And Erect (Final result)  Result time 08/13/17 09:56:39    Final result by Freddie Nelson MD (08/13/17 09:56:39)                 Impression:     Small bowel obstruction.  Increasing distention compared to prior exam.      Electronically signed by: FREDDIE NELSON MD  Date:     08/13/17  Time:    09:56              Narrative:    PROCEDURE: XR ABDOMEN FLAT AND ERECT, 08/13/17 08:43:02    HISTORY:  small bowel obstruction    COMPARISON STUDIES: August 11, 2017    FINDINGS:   Diffusely dilated small bowel loops throughout the abdomen with maximum diameter of approximately 4.4 cm.  Scattered surgical clips.  Small amounts of air in the colon with some contrast in the lower left colon.  Nasogastric tube present.  left sided nephroureteral stent.                             X-Ray Chest 1 View (Final result)  Result time 08/11/17 19:56:15    Final result by Stefan Gagnon MD (08/11/17 19:56:15)                 Impression:     Tip of the NG tube not visualized. See above.      Electronically signed by: STEFAN GAGNON MD  Date:     08/11/17  Time:    19:56              Narrative:    Exam: Chest X-ray, one view.    History: Encounter for fitting and  adjustment of other gastrointestinal appliance and device    Findings: Comparison is made to prior examination at 1536 hrs. NG tube has been placed, extending into the distal esophagus, tip not visualized. Consider AP view of the abdomen for further assessment.                             CT Abdomen Pelvis  Without Contrast (Final result)  Result time 08/11/17 18:38:49   Procedure changed from CT Abdomen Pelvis With Contrast     Final result by Stefan Gagnon MD (08/11/17 18:38:49)                 Impression:     Mid small bowel obstruction, likely related to postoperative adhesions. See above. No free air or pneumatosis identified.      Electronically signed by: STEFAN GAGNON MD  Date:     08/11/17  Time:    18:38              Narrative:    Examination: CT of the abdomen and pelvis without contrast.    History: Vomiting    Findings: Comparison is made to prior examination dated 03/20/2000 1534 she kidney again noted. Left ureteral stent remains in place. Postoperative changes again noted in the midline retroperitoneum.    Mid to proximal small bowel loops are dilated, with marked distention of the stomach as well. Contrast noted in the distal esophagus, likely related to reflux. Distal small bowel loops are decompressed, with transition point noted at the lower midline retroperitoneum, at the site of prior surgery. No free air or pneumatosis, and otherwise no significant change from prior exam.                             X-Ray Abdomen Flat And Erect (Final result)  Result time 08/11/17 15:53:16    Final result by Hill Gastelum MD (08/11/17 15:53:16)                 Impression:      Bowel gas pattern may be due to an ileus.  Small bowel obstruction not excluded.    Other findings as described above.      Electronically signed by: HILL GASTELUM MD  Date:     08/11/17  Time:    15:53              Narrative:    EXAM:  2 view abdomen, flat and erect    CLINICAL HISTORY: abdominal pain, unspecified without  report of trauma    COMPARISON STUDIES: Abdominal series 04/22/2016    FINDINGS:    Abdomen: No definite free air is seen. Several surgical clips within the midabdomen.  Left ureteral stent.  atherosclerosis.    There are several air-fluid levels within the abdomen.  There are dilated small bowel loops..                             X-Ray Chest PA And Lateral (Final result)  Result time 08/11/17 15:51:11    Final result by Hill Mario MD (08/11/17 15:51:11)                 Impression:      No acute cardiopulmonary findings.  Please see above      Electronically signed by: HILL MARIO MD  Date:     08/11/17  Time:    15:51              Narrative:    EXAM: Chest X-ray PA and Lateral    CLINICAL HISTORY:     Vomiting, unspecified    COMPARISON STUDIES:     04/24/2017    FINDINGS:    Surgical clips in the right axilla.  There are surgical staples in the right lung.  Chronic blunting of the right costophrenic sulcus.  Left lung appears clear.  Normal heart size.. Chronic right rib deformities probably from previous thoracotomy.  Coronary artery stent.  Chronic compression of one of the midthoracic vertebral bodies.  Surgical staples and a ureteral stent is noted on the lateral view..

## 2017-08-17 NOTE — PLAN OF CARE
Problem: Patient Care Overview  Goal: Plan of Care Review  PT REQUIRES MIN A FOR T/F TO CHAIR FROM BED.    Pt remains injury free. Up with assist, fall precautions in place. Denies any pain, n/v. Having BMs. Tolerating clears. Will have cardiac diet for supper. NPO after midnight. VSS. Chart reviewed. Will monitor.

## 2017-08-17 NOTE — ASSESSMENT & PLAN NOTE
- CT head showed 1 cm area of hypodensity in the white matter of the left centrum semiovale  - Unable to do MRI brain   - Neurology following   - PT/OT eval  - resume statin/plavix  - CT head with and without today   - CTA head  - ECHO pending

## 2017-08-17 NOTE — ASSESSMENT & PLAN NOTE
- resolved   - Likely due to persistent vomiting.  - GI consulted, Dr. Tanner aware  - Protonix IV daily for now.  - monitor H/H

## 2017-08-17 NOTE — PLAN OF CARE
Problem: Occupational Therapy Goal  Goal: Occupational Therapy Goal  OT GOALS TO BE MET 8-22-17  1. S WITH UE DRESSING  2. S WITH LE DRESSING  3. PT WILL TOLERATE 1 SET X 15 REPS B UE ROM EXERCISE   Outcome: Ongoing (interventions implemented as appropriate)  Improvements with le dressing

## 2017-08-17 NOTE — ASSESSMENT & PLAN NOTE
- General Surgery following   - Supportive care  - IV fluids  - Pt reports large BM yesterday, passing flatus  - Advance diet

## 2017-08-17 NOTE — PLAN OF CARE
Problem: Patient Care Overview  Goal: Plan of Care Review  PT REQUIRES MIN A FOR T/F TO CHAIR FROM BED.    Outcome: Ongoing (interventions implemented as appropriate)  PT GT TRAINED 2X60' WITH RW AND MIN A WITH R LE PROSTHESIS AND L AFO.

## 2017-08-17 NOTE — PROGRESS NOTES
"[facility] Hospital Medicine  Progress Note    Patient Name: Kodi Ribeiro  MRN: [id number]  Patient Class: IP- Inpatient   Admission Date: 8/11/2017  Length of Stay: 6 days  Attending Physician: Karla Hutton MD  Primary Care Provider: Norma Nuñez MD        Subjective:     Principal Problem:Small bowel obstruction due to adhesions    HPI:  Mr. Ribeiro is a 68 y/o  male with h/o CAD, PAD, cardiac stents, CKD3, HTN, right BKA, AAA repair complicated by SBO, requiring surgery, left ureteral stent placement, presents to the ED c/o abdominal discomfort, nausea and vomiting since yesterday morning 1 AM. He thought it was "stomach bug" and would pass, but continued to get worse, hence presented to the ED. Last BM 3 days ago (wednesday).    X-Ray abdomen shows "several air-fluid levels within the abdomen.  There are dilated small bowel loops."    CT abdomen reveals "Mid to proximal small bowel loops are dilated, with marked distention of the stomach as well. Contrast noted in the distal esophagus, likely related to reflux. Distal small bowel loops are decompressed, with transition point noted at the lower midline retroperitoneum, at the site of prior surgery".     was contacted who will admit the patient. [facility] was consulted for medical management. Patient just had NG tube placed.    In the ED today, patient had coffee-ground emesis in the ED.  was consulted who recommend supportive care at this time.      Hospital Course:  8/12/17 No acute issues overnight. Pt reports pain is improved. Continue NG decompression. 8/13/17 Pt reports that he had " a really good bowel movement this morning". Pt reports improvement in symptoms. Continue NG compression. No current issues or complaints. ABD xray this morning showed increasing distention compared to prior exam. 8/14/17 The patients NG tube inadvertently came out overnight. Ok per primary team to leave it out. Pts last BM was yesterday, " pt is passing flatus. No current complaints. 8/15/17 The patient began complaining of RUE numbness yesterday. On exam strength was equal bilaterally. Head CT showed a vague 1 cm area of hypodensity in the white matter of the left centrum semiovale. Will obtain an MRI of the brain today and consult Neurology. 8/16/17 Pt unable to have MRI yesterday due to claustrophobia. Neurology recommends doing a CT head w wo and CTA head to view intracranial vessels. Carotid US showed 90-99% stenosis within the left internal carotid artery. Vascular Surgery consulted. 8/17/17 RUE numbness resolving. CTA head showed calcific atherosclerotic plaque of the bilateral cavernous internal carotid arteries with focal moderate to high grade stenoses at the supraclinoid ICAs bilaterally. Neurology following. Vascular following plans on intervention for left internal artery stenosis at some point. Upon discharge the patient will go to [facility]. Pt continuing to have bowel movements.     Interval History: RUE numbness resolving. CTA head showed calcific atherosclerotic plaque of the bilateral cavernous internal carotid arteries with focal moderate to high grade stenoses at the supraclinoid ICAs bilaterally. Neurology following. Vascular following plans on intervention for left internal artery stenosis at some point. Upon discharge the patient will go to [facility]. Pt continuing to have bowel movements.       Review of Systems   Constitutional: Negative.  Negative for activity change, appetite change, chills, fatigue, fever and unexpected weight change.   HENT: Negative.  Negative for congestion, facial swelling, nosebleeds, rhinorrhea, sinus pressure, sneezing and sore throat.    Eyes: Negative.  Negative for photophobia, discharge, redness and visual disturbance.   Respiratory: Negative.  Negative for apnea, cough, chest tightness, shortness of breath, wheezing and stridor.    Cardiovascular: Negative.  Negative for chest  pain, palpitations and leg swelling.   Gastrointestinal: Positive for abdominal pain (discomfort), nausea and vomiting. Negative for abdominal distention, anal bleeding, blood in stool, constipation and diarrhea.   Endocrine: Negative.  Negative for polydipsia, polyphagia and polyuria.   Genitourinary: Negative.  Negative for difficulty urinating, discharge, dysuria, flank pain, frequency, hematuria and urgency.   Musculoskeletal: Negative.  Negative for arthralgias, back pain, gait problem, joint swelling, myalgias, neck pain and neck stiffness.   Skin: Negative.  Negative for color change, pallor and rash.   Allergic/Immunologic: Negative.  Negative for environmental allergies, food allergies and immunocompromised state.   Neurological: Positive for numbness. Negative for dizziness, tremors, seizures, syncope, facial asymmetry, speech difficulty, weakness, light-headedness and headaches.        LUE numbness   Hematological: Negative.    Psychiatric/Behavioral: Negative.  Negative for behavioral problems, confusion, hallucinations and suicidal ideas. The patient is not nervous/anxious.    All other systems reviewed and are negative.    Objective:     Vital Signs (Most Recent):  Temp: 97.6 °F (36.4 °C) (08/17/17 1548)  Pulse: 77 (08/17/17 1548)  Resp: 18 (08/17/17 1548)  BP: (!) 163/78 (08/17/17 1548)  SpO2: 96 % (08/17/17 1548) Vital Signs (24h Range):  Temp:  [97.5 °F (36.4 °C)-98.5 °F (36.9 °C)] 97.6 °F (36.4 °C)  Pulse:  [70-82] 77  Resp:  [18] 18  SpO2:  [94 %-97 %] 96 %  BP: (129-172)/(63-81) 163/78     Weight: 90.7 kg (199 lb 15.3 oz)  Body mass index is 26.38 kg/m².    Intake/Output Summary (Last 24 hours) at 08/17/17 1639  Last data filed at 08/17/17 0845   Gross per 24 hour   Intake          2506.67 ml   Output             1900 ml   Net           606.67 ml      Physical Exam   Constitutional: He is oriented to person, place, and time. He appears well-developed and well-nourished. No distress.   Appears  uncomfortable.   HENT:   Head: Normocephalic and atraumatic.   Eyes: Conjunctivae and EOM are normal. Pupils are equal, round, and reactive to light. No scleral icterus.   Neck: Normal range of motion. Neck supple. No thyromegaly present.   Cardiovascular: Normal rate, regular rhythm, normal heart sounds and intact distal pulses.    No murmur heard.  Pulmonary/Chest: Effort normal and breath sounds normal. No respiratory distress. He has no wheezes. He exhibits no tenderness.   Abdominal: Soft. He exhibits no distension. Bowel sounds are decreased. There is no tenderness.   Musculoskeletal: Normal range of motion. He exhibits no edema, tenderness or deformity.   Right BKA, left foot amputation   Lymphadenopathy:     He has no cervical adenopathy.   Neurological: He is alert and oriented to person, place, and time. No cranial nerve deficit. He exhibits normal muscle tone. Coordination normal.   Patient is alert and oriented to person, place and time. Pupils ERRL and EOM normal. No cranial nerve deficit and cranial nerves II-XII are intact. Strength is full bilaterally; it is equal and 5/5 in bilateral upper and lower extremities. There is no pronator drift of outstretched arms. Light touch sense is intact. Speech is clear and normal. DTRs are normal and symmetric; they are 2+ and equal bilaterally. No acute focal neurological deficits noted.      Skin: Skin is warm and dry. No rash noted. He is not diaphoretic. No erythema.   Psychiatric: He has a normal mood and affect. His behavior is normal. Judgment and thought content normal.   Nursing note and vitals reviewed.      Significant Labs: All pertinent labs within the past 24 hours have been reviewed.    Significant Imaging:   Imaging Results          CTA Head (Final result)  Result time 08/16/17 18:43:23    Final result by Will Nelson MD (08/16/17 18:43:23)                 Impression:         1.  No evidence for intracranial aneurysm or AVM.  2.  Calcific  atherosclerotic plaque of the bilateral cavernous internal carotid arteries with focal moderate to high grade stenoses at the supraclinoid ICAs bilaterally.  3.  No emboli or filling defects or thrombus seen in the intracranial arterial circulation.    All CT scans at this facility use dose modulation, iterative reconstruction and/or weight based dosing when appropriate to reduce radiation dose to as low as reasonably achievable.      Electronically signed by: KITTY ALFORD MD  Date:     08/16/17  Time:    18:43              Narrative:    Exam: CT angiogram of the head with and without intravenous contrast    Clinical History:  CVA. Right upper extremity weakness.     Technique: The head was scanned both before and after the intravenous administration of 75 cc of Isovue 370..     Findings:    No hydrocephalus, midline shift, mass effect, or acute intracranial hemorrhage. The visualized paranasal sinuses and mastoid air cells are clear. The skull is intact.    The angiogram shows no intracranial aneurysm or AVM.  There is no abnormal intracranial enhancement with contrast administration.  There is no arterial occlusion or truncation or filling defect.  There are bilateral calcifications of the cavernous ICAs with a focal moderate to high grade stenosis at the supraclinoid right ICA.  There is a focal moderate to high grade stenosis of the supraclinoid left ICA from the calcific plaque.  No focal stenosis is seen in the posterior circulation.  No evidence for vasculitis.  The bilateral internal cerebral veins and dural venous sinuses are patent.                             US Carotid Bilateral (Final result)     Abnormal  Result time 08/16/17 12:08:20    Final result by Aurelio Curtis MD (08/16/17 12:08:20)                 Impression:        90-99% stenosis within the left internal carotid artery. The patient's nurse was notified at 12 PM on 8/16/17.    **Categories of stenosis (0-15%, 16-49%, 50-69% and 70-99% ) are  based on published criteria that have been internally validated.  Degree of stenosis is based on NASCET criteria and refers to the degree of luminal diameter narrowing as a percentage of the normal ICA distal to the stenosis and any area of post stenotic dilation.        Electronically signed by: BHUMI LAU MD  Date:     08/16/17  Time:    12:08              Narrative:    BILATERAL CAROTID DUPLEX ULTRASOUND.    Comparison: None    History:  Stroke    FINDINGS:     Right common carotid:   Peak systolic velocity 155 cm/sec.    Right internal carotid artery: Mild plaque is seen in the bulb    Peak systolic velocity: 123 cm/sec.   IC/CC ratio:  0.8.    Left common carotid:   Peak systolic velocity 67 cm/sec.    Left internal carotid artery: Significant plaque is seen    Peak systolic velocity 582  cm/sec.    IC/CC ratio 8.6.    Both vertebral arteries demonstrate antegrade flow.                             X-Ray Abdomen Flat And Erect (Final result)  Result time 08/15/17 11:37:29    Final result by RADHA Medellin Sr., MD (08/15/17 11:37:29)                 Impression:      1. There are dilated loops of small bowel. In the expected location of the mid to distal portion of the ileum there is a dilated loop of small bowel with a diameter of 4.1 cm. On the prior examination it measured 4.4 cm. This is consistent with the patient's history of a small bowel obstruction.  2. There is a suspected compression fracture of the L1 vertebral body.  3. Surgical changes      Electronically signed by: RADHA MEDELLIN MD  Date:     08/15/17  Time:    11:37              Narrative:    Flat and erect KUB    History: small bowel obstruction; resolving    Finding: Comparison was made to a prior examination performed on 8/13/2017. There are dilated loops of small bowel. In the expected location of the mid to distal portion of the ileum there is a dilated loop of small bowel with a diameter of 4.1 cm. On the prior examination it measured 4.4  cm. There is no pneumoperitoneum. There is a suspected compression fracture of the L1 vertebral body. There are surgical clips projected over the abdomen and pelvis. There is a left ureteral catheter again visualized. There is partial visualization of a vascular stent in the expected location of the left superficial femoral artery.                             CT Head Without Contrast (Final result)  Result time 08/14/17 18:12:27    Final result by Dwayne Nowak Jr., MD (08/14/17 18:12:27)                 Impression:     Small subtle zone of hypodensity in the left centrum semiovale white matter, with possibilities as above.  No acute hemorrhage is noted.      All CT scans at this facility use dose modulation, iterative reconstruction, and/or weight based dosing when appropriate to reduce radiation dose to as low as reasonably achievable.        Electronically signed by: DWAYNE NOWAK MD  Date:     08/14/17  Time:    18:12              Narrative:    Exam: CT HEAD WITHOUT CONTRAST    History:  Right upper extremity numbness/weakness      Technique: Noncontrast axial images of the head were obtained.  Motion artifact.  A satisfactory exam was acquired.    Comparison:None    Findings: Vague 1 cm area of hypodensity in the white matter of the left centrum semiovale, image 20 series 5.  Possibilities include an area of microvascular ischemic change or subacute lacunar ischemic infarct.  Ventricular system is normal.  No hydrocephalus.  No midline shift.  No abnormal density to indicate acute major vascular distribution ischemic infarction or hemorrhage.  No mass effect.  No extra-axial fluid collections.     Visualized paranasal sinuses and mastoid air cells appear clear.      No calvarial fracture.                             X-Ray Abdomen Flat And Erect (Final result)  Result time 08/13/17 09:56:39    Final result by Lavell Nelson MD (08/13/17 09:56:39)                 Impression:     Small bowel obstruction.   Increasing distention compared to prior exam.      Electronically signed by: FREDDIE ALFORD MD  Date:     08/13/17  Time:    09:56              Narrative:    PROCEDURE: XR ABDOMEN FLAT AND ERECT, 08/13/17 08:43:02    HISTORY:  small bowel obstruction    COMPARISON STUDIES: August 11, 2017    FINDINGS:   Diffusely dilated small bowel loops throughout the abdomen with maximum diameter of approximately 4.4 cm.  Scattered surgical clips.  Small amounts of air in the colon with some contrast in the lower left colon.  Nasogastric tube present.  left sided nephroureteral stent.                             X-Ray Chest 1 View (Final result)  Result time 08/11/17 19:56:15    Final result by Stefan Gagnon MD (08/11/17 19:56:15)                 Impression:     Tip of the NG tube not visualized. See above.      Electronically signed by: STEFAN GAGNON MD  Date:     08/11/17  Time:    19:56              Narrative:    Exam: Chest X-ray, one view.    History: Encounter for fitting and adjustment of other gastrointestinal appliance and device    Findings: Comparison is made to prior examination at 1536 hrs. NG tube has been placed, extending into the distal esophagus, tip not visualized. Consider AP view of the abdomen for further assessment.                             CT Abdomen Pelvis  Without Contrast (Final result)  Result time 08/11/17 18:38:49   Procedure changed from CT Abdomen Pelvis With Contrast     Final result by Stefan Gagnon MD (08/11/17 18:38:49)                 Impression:     Mid small bowel obstruction, likely related to postoperative adhesions. See above. No free air or pneumatosis identified.      Electronically signed by: STEFAN GAGNON MD  Date:     08/11/17  Time:    18:38              Narrative:    Examination: CT of the abdomen and pelvis without contrast.    History: Vomiting    Findings: Comparison is made to prior examination dated 03/20/2000 1534 she kidney again noted. Left ureteral stent remains in  place. Postoperative changes again noted in the midline retroperitoneum.    Mid to proximal small bowel loops are dilated, with marked distention of the stomach as well. Contrast noted in the distal esophagus, likely related to reflux. Distal small bowel loops are decompressed, with transition point noted at the lower midline retroperitoneum, at the site of prior surgery. No free air or pneumatosis, and otherwise no significant change from prior exam.                             X-Ray Abdomen Flat And Erect (Final result)  Result time 08/11/17 15:53:16    Final result by Hill Mario MD (08/11/17 15:53:16)                 Impression:      Bowel gas pattern may be due to an ileus.  Small bowel obstruction not excluded.    Other findings as described above.      Electronically signed by: HILL MARIO MD  Date:     08/11/17  Time:    15:53              Narrative:    EXAM:  2 view abdomen, flat and erect    CLINICAL HISTORY: abdominal pain, unspecified without report of trauma    COMPARISON STUDIES: Abdominal series 04/22/2016    FINDINGS:    Abdomen: No definite free air is seen. Several surgical clips within the midabdomen.  Left ureteral stent.  atherosclerosis.    There are several air-fluid levels within the abdomen.  There are dilated small bowel loops..                             X-Ray Chest PA And Lateral (Final result)  Result time 08/11/17 15:51:11    Final result by Hill Mario MD (08/11/17 15:51:11)                 Impression:      No acute cardiopulmonary findings.  Please see above      Electronically signed by: HILL MARIO MD  Date:     08/11/17  Time:    15:51              Narrative:    EXAM: Chest X-ray PA and Lateral    CLINICAL HISTORY:     Vomiting, unspecified    COMPARISON STUDIES:     04/24/2017    FINDINGS:    Surgical clips in the right axilla.  There are surgical staples in the right lung.  Chronic blunting of the right costophrenic sulcus.  Left lung appears clear.  Normal  heart size.. Chronic right rib deformities probably from previous thoracotomy.  Coronary artery stent.  Chronic compression of one of the midthoracic vertebral bodies.  Surgical staples and a ureteral stent is noted on the lateral view..                                 Assessment/Plan:      * Small bowel obstruction due to adhesions    - General Surgery following   - Supportive care  - IV fluids  - Pt reports large BM yesterday, passing flatus  - Advance diet         Left carotid artery stenosis    - Carotid US showed 90-99% stenosis within the left internal carotid artery.   - Vascular Surgery following.   - NPO after MN for procedure tomorrow         Cerebral infarction due to embolism of left middle cerebral artery    - Neurology following   - Unable to get MRI due to claustrophobia  - CT head with and without not done yesterday  - CTA head calcific atherosclerotic plaque of the bilateral cavernous internal carotid arteries with focal moderate to high grade stenoses at the supraclinoid ICAs bilaterally.  - ECHO showed EF 60% with diastolic dysfunction           Right upper extremity numbness    - CT head showed 1 cm area of hypodensity in the white matter of the left centrum semiovale  - Unable to do MRI brain   - Neurology following   - PT/OT eval  - resume statin/plavix  - CT head with and without today   - CTA head  - ECHO pending             Hypokalemia    - K+ 3.7  - monitor           Coffee ground emesis    - resolved   - Likely due to persistent vomiting.  - GI consulted, Dr. Tanner aware  - Protonix IV daily for now.  - monitor H/H        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    - H/o Right BKA          CAD;Old MI ; s/p stents x 3 (2000)     - Resume statin/plavix  - Denies chest pain or SOB at this time.          CKD (chronic kidney disease) stage 3, GFR 30-59 ml/min    - Serum creatinine at baseline  - Gentle IV hydration  - Monitor, labs in AM          Essential hypertension    -  Hydralazine IV prn  - Resume oral meds  - Monitor and adjust as needed            VTE Risk Mitigation         Ordered     Medium Risk of VTE  Once      08/11/17 2210     Place sequential compression device  Until discontinued      08/11/17 2210     Reason for No Pharmacological VTE Prophylaxis  Once      08/11/17 2210     Place sequential compression device  Until discontinued      08/11/17 2210              Brennen Weiner NP  Department of Hospital Medicine   [facility]

## 2017-08-17 NOTE — PT/OT/SLP PROGRESS
Occupational Therapy  Treatment    Kodi Ribeiro   MRN: [id number]   Admitting Diagnosis: Small bowel obstruction due to adhesions    OT Date of Treatment: 08/17/17   OT Start Time: 1040  OT Stop Time: 1110  OT Total Time (min): 30 min    Billable Minutes:  Therapeutic Activity 15 minutes and Therapeutic Exercise 15  minutes    General Precautions: Standard, fall  Orthopedic Precautions: N/A  Braces:           Subjective:  Communicated with nurse Ellen and epic chart review prior to session.    Pain/Comfort  Pain Rating 1: 0/10    Objective:  Patient found with: peripheral IV     Functional Mobility:  Bed Mobility:  Rolling/Turning Right: Supervision  Scooting/Bridging: Supervision  Supine to Sit: Supervision  Sit to Supine: Stand by Assistance    Transfers:   Sit <> Stand Assistance: Maximum Assistance, Minimum Assistance  Sit <> Stand Assistive Device: Rolling Walker  Bed <> Chair Technique: Stand Pivot  Bed <> Chair Transfer Assistance: Minimum Assistance  Bed <> Chair Assistive Device: Rolling Walker    Functional Ambulatio: PT  AMBULATED 60 FEET X 2 WITH MIN A, RW AND VC'S FOR POOR POSTURE AND CORRECT TECHNIQUE    Activities of Daily Living:     Feeding adaptive equipment: NA  UE Dressing Level of Assistance: Minimum assistance  UE adaptive equipment: NA  LE Dressing Level of Assistance: Contact guard  LE adaptive equipment: NA  Grooming Position: Seated, bedside chair  Grooming Level of Assistance: Supervision              Bathing adaptive equipment: NA    Balance:   Static Sit: NORMAL: No deviations seen in posture held statically  Dynamic Sit: NORMAL: No deviations seen in posture held dynamically  Static Stand: POOR+: Needs MINIMAL assist to maintain  Dynamic stand: POOR+  Therapeutic Activities and Exercises:  PT PERFORMED 2 SET X 10 REPS B UE ROM EXERCISE WITH 2 LB DOWEL IN ALL AVAILABLE PLANES AND RANGES SEATED IN BED SIDE CHAIR. PT LEFT IN CHAIR WITH CALL BUTTON IN REACH AND ALL NEEDS MET      AM-PAC 6  "CLICK ADL   How much help from another person does this patient currently need?   1 = Unable, Total/Dependent Assistance  2 = A lot, Maximum/Moderate Assistance  3 = A little, Minimum/Contact Guard/Supervision  4 = None, Modified [place]/Independent    Putting on and taking off regular lower body clothing? : 4  Bathing (including washing, rinsing, drying)?: 3  Toileting, which includes using toilet, bedpan, or urinal? : 3  Putting on and taking off regular upper body clothing?: 3  Taking care of personal grooming such as brushing teeth?: 4  Eating meals?: 4  Total Score: 21     AM-PAC Raw Score CMS "G-Code Modifier Level of Impairment Assistance   6 % Total / Unable   7 - 8 CM 80 - 100% Maximal Assist   9-13 CL 60 - 80% Moderate Assist   14 - 19 CK 40 - 60% Moderate Assist   20 - 22 CJ 20 - 40% Minimal Assist   23 CI 1-20% SBA / CGA   24 CH 0% Independent/ Mod I       Patient left up in chair with all lines intact, call button in reach, NURSE ALEXANDRE notified and SPOUSE  present    ASSESSMENT:  Kodi Ribeiro is a 67 y.o. male with a medical diagnosis of Small bowel obstruction due to adhesions and presents with IMPAIRED ADL'S, FUNCTIONAL MOBILITY, IMPAIRED T/F'S, B UE STRENGTH/ENDURANCE. PT MAY CONTINUE TO BENEFIT FROM SKILLED O.T..    Rehab identified problem list/impairments: Rehab identified problem list/impairments: weakness, impaired functional mobilty, decreased safety awareness, impaired endurance, gait instability, impaired balance, impaired self care skills, decreased lower extremity function, decreased ROM    Rehab potential is good.    Activity tolerance: Fair    Discharge recommendations: Discharge Facility/Level Of Care Needs: rehabilitation facility, nursing facility, skilled     Barriers to discharge: Barriers to Discharge: None    Equipment recommendations: none     GOALS:    Occupational Therapy Goals        Problem: Occupational Therapy Goal    Goal Priority Disciplines Outcome " Interventions   Occupational Therapy Goal     OT, PT/OT Ongoing (interventions implemented as appropriate)    Description:  OT GOALS TO BE MET 8-22-17  1. S WITH UE DRESSING  2. S WITH LE DRESSING  3. PT WILL TOLERATE 1 SET X 15 REPS B UE ROM EXERCISE                    Plan:  Patient to be seen 3 x/week to address the above listed problems via self-care/home management, therapeutic activities, therapeutic exercises  Plan of Care expires:    Plan of Care reviewed with: patient         Radha Nathaniel, OT  08/17/2017

## 2017-08-17 NOTE — ASSESSMENT & PLAN NOTE
- Neurology following   - Unable to get MRI due to claustrophobia  - CT head with and without not done yesterday  - CTA head calcific atherosclerotic plaque of the bilateral cavernous internal carotid arteries with focal moderate to high grade stenoses at the supraclinoid ICAs bilaterally.  - ECHO showed EF 60% with diastolic dysfunction

## 2017-08-17 NOTE — CONSULTS
Contacted Matilda Thayer Admissions Liaison with [facility]. Referral faxed to [facility] via [facility].

## 2017-08-17 NOTE — PT/OT/SLP PROGRESS
Physical Therapy  Treatment    Kodi Ribeiro   MRN: [id number]   Admitting Diagnosis: Small bowel obstruction due to adhesions    PT Received On: 08/17/17  PT Start Time: 1121     PT Stop Time: 1151    PT Total Time (min): 30 min       Billable Minutes:  Gait [id number] and Therapeutic Exercise 15    Treatment Type: Treatment  PT/PTA: PT             General Precautions: Standard, fall  Orthopedic Precautions:     Braces: AFO (R LE PROSTHESIS)         Subjective:  Communicated with NURSE ALEXANDRE AND EPIC CHART REVIEW  prior to session.   PT AGREED TO TXX     Pain/Comfort  Pain Rating 1: 0/10    Objective:   Patient found with: peripheral IV    Functional Mobility:  Bed Mobility:   Rolling/Turning Right: Stand by assistance, With trapeze  Scooting/Bridging: Stand by Assistance  Supine to Sit: Stand by Assistance    Transfers:  Sit <> Stand Assistance: Minimum Assistance  Sit <> Stand Assistive Device: Rolling Walker  Bed <> Chair Technique: Stand Pivot  Bed <> Chair Assistance: Minimum Assistance  Bed <> Chair Assistive Device: Rolling Walker    Gait:   Gait Distance: PT GT TRAINED 2X60' WITH RW AND PROSTHESIS ON R LE AND AFO ON L LE  Assistance 1: Minimum assistance  Gait Assistive Device: Rolling walker  Gait Pattern: swing-to gait  Gait Deviation(s): decreased maria victoria, decreased weight-shifting ability      Therapeutic Activities and Exercises:  PT SEATED EOB AND DONNED AFO /SHOE AND R LE PROSTHESIS IND. PT STOOD WITH RW AND MIN A. PT GT TRAINED WITH RW AND MIN A 2X60' WITH STEP TO GT CUES FOR RW SAFETY . PT TO RM T/F TO CHAIR WITH RW AND MIN A. PT SEATED IN CHAIR FOR B LE TE 2X10 REPS OF TKE AND MIP. PT LEFT SEATED AND EDUCATED TO CALL FOR ASSIST BACK TO BED.     AM-PAC 6 CLICK MOBILITY  How much help from another person does this patient currently need?   1 = Unable, Total/Dependent Assistance  2 = A lot, Maximum/Moderate Assistance  3 = A little, Minimum/Contact Guard/Supervision  4 = None, Modified  [place]/Independent    Turning over in bed (including adjusting bedclothes, sheets and blankets)?: 3  Sitting down on and standing up from a chair with arms (e.g., wheelchair, bedside commode, etc.): 3  Moving from lying on back to sitting on the side of the bed?: 3  Moving to and from a bed to a chair (including a wheelchair)?: 3  Need to walk in hospital room?: 3  Climbing 3-5 steps with a railing?: 1  Total Score: 16    AM-PAC Raw Score CMS G-Code Modifier Level of Impairment Assistance   6 % Total / Unable   7 - 9 CM 80 - 100% Maximal Assist   10 - 14 CL 60 - 80% Moderate Assist   15 - 19 CK 40 - 60% Moderate Assist   20 - 22 CJ 20 - 40% Minimal Assist   23 CI 1-20% SBA / CGA   24 CH 0% Independent/ Mod I     Patient left up in chair with call button in reach.    Assessment:  PT PROGRESSING WITH GT TRAINING     Rehab identified problem list/impairments: Rehab identified problem list/impairments: weakness, impaired endurance, gait instability, impaired functional mobilty, impaired balance, impaired self care skills, decreased lower extremity function, decreased ROM, decreased upper extremity function    Rehab potential is excellent.    Activity tolerance: Good    Discharge recommendations: Discharge Facility/Level Of Care Needs: rehabilitation facility     Barriers to discharge: Barriers to Discharge: None    Equipment recommendations: Equipment Needed After Discharge: walker, rolling     GOALS:    Physical Therapy Goals        Problem: Physical Therapy Goal    Goal Priority Disciplines Outcome Goal Variances Interventions   Physical Therapy Goal     PT/OT, PT      Description:  PT WILL BE SEEN FOR P.T. FOR A MIN OF 5 OUT OF 7 DAYS A WEEK  LT17  1. PT WILL COMPLETE T/F TO CHAIR WITH S.  2. PT WILL GT TRAIN WITH PROTHESIS X 50' WITH MIN A WITH OR WITHOUT RW  3. PT WILL COMPLETE BED MOBILITY IND.                     PLAN:    Patient to be seen    to address the above listed problems via gait  training, therapeutic activities, therapeutic exercises  Plan of Care expires: 08/22/17  Plan of Care reviewed with: patient, spouse         Lauren Doss, PT  08/17/2017

## 2017-08-17 NOTE — PLAN OF CARE
Problem: Patient Care Overview  Goal: Plan of Care Review  PT REQUIRES MIN A FOR T/F TO CHAIR FROM BED.    Outcome: Ongoing (interventions implemented as appropriate)  Room air, respirations even and unlabored, no distress noted on assessment, pain, no nausea or shortness of breath, bed alarm, refused left leg scd

## 2017-08-17 NOTE — PROGRESS NOTES
[facility] -   Vascular Surgery  Progress Note    Patient Name: Kodi Ribeiro  MRN: [id number]  Admission Date: 8/11/2017  Primary Care Provider: Norma Nuñez MD    Subjective:     Interval History: left sided CVA on CT with left ICA significant stenosis as well as intracranial bilateral ica stenosis.      Post-Op Info:  Procedure(s) (LRB):  ENDARTERECTOMY-CAROTID (Left)          Medications:  Continuous Infusions:   lactated Ringers 125 mL/hr at 08/16/17 2253     Scheduled Meds:   amlodipine  10 mg Oral Daily    carvedilol  6.25 mg Oral BID WM    clopidogrel  75 mg Oral Daily    losartan  100 mg Oral Daily    pantoprazole  40 mg Intravenous BID    pravastatin  80 mg Oral QHS     PRN Meds:acetaminophen, guaifenesin 100 mg/5 ml, hydrALAZINE, HYDROmorphone, ondansetron, promethazine (PHENERGAN) IVPB     Objective:     Vital Signs (Most Recent):  Temp: 97.6 °F (36.4 °C) (08/17/17 1548)  Pulse: 77 (08/17/17 1548)  Resp: 18 (08/17/17 1548)  BP: (!) 163/78 (08/17/17 1548)  SpO2: 96 % (08/17/17 1548) Vital Signs (24h Range):  Temp:  [97.5 °F (36.4 °C)-98.5 °F (36.9 °C)] 97.6 °F (36.4 °C)  Pulse:  [70-82] 77  Resp:  [18] 18  SpO2:  [94 %-97 %] 96 %  BP: (129-172)/(63-81) 163/78       Date 08/17/17 0700 - 08/18/17 0659   Shift 5597-1775 2607-8193 5962-5898 24 Hour Total   I  N  T  A  K  E   Shift Total  (mL/kg)       O  U  T  P  U  T   Urine  (mL/kg/hr) 300  (0.4)   300    Shift Total  (mL/kg) 300  (3.3)   300  (3.3)   Weight (kg) 90.7 90.7 90.7 90.7       Physical Exam     4/5 strenght rue but 5/5 rle.  No speech issues.     Significant Labs:  All pertinent labs from the last 24 hours have been reviewed.    Significant Diagnostics:  I have reviewed all pertinent imaging results/findings within the past 24 hours.    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  Explained high risk for perioperative mi and stroke.  He understands risks.  Dr. Jamil to do left CEA tomorrow.    Fareed Hyatt MD  Vascular Surgery  [facility] -

## 2017-08-17 NOTE — ASSESSMENT & PLAN NOTE
- Carotid US showed 90-99% stenosis within the left internal carotid artery.   - Vascular Surgery following.   - NPO after MN for procedure tomorrow

## 2017-08-17 NOTE — PLAN OF CARE
08/17/17 0937   Medicare Message   Important Message from Medicare regarding Discharge Appeal Rights Given to patient/caregiver;Explained to patient/caregiver;Signed/date by patient/caregiver   Date IMM was signed 08/17/17   Time IMM was signed 0925

## 2017-08-18 ENCOUNTER — ANESTHESIA (OUTPATIENT)
Dept: SURGERY | Facility: HOSPITAL | Age: 67
DRG: 981 | End: 2017-08-18
Payer: MEDICARE

## 2017-08-18 LAB
ABO GROUP BLD: NORMAL
ANION GAP SERPL CALC-SCNC: 12 MMOL/L
BASOPHILS # BLD AUTO: 0.02 K/UL
BASOPHILS NFR BLD: 0.3 %
BLD GP AB SCN CELLS X3 SERPL QL: NORMAL
BUN SERPL-MCNC: 9 MG/DL
CALCIUM SERPL-MCNC: 8.9 MG/DL
CHLORIDE SERPL-SCNC: 102 MMOL/L
CK MB SERPL-MCNC: 3.8 NG/ML
CK MB SERPL-RTO: 2.6 %
CK SERPL-CCNC: 149 U/L
CO2 SERPL-SCNC: 24 MMOL/L
CREAT SERPL-MCNC: 1.1 MG/DL
DIFFERENTIAL METHOD: ABNORMAL
EOSINOPHIL # BLD AUTO: 0.2 K/UL
EOSINOPHIL NFR BLD: 3.3 %
ERYTHROCYTE [DISTWIDTH] IN BLOOD BY AUTOMATED COUNT: 13.4 %
EST. GFR  (AFRICAN AMERICAN): >60 ML/MIN/1.73 M^2
EST. GFR  (NON AFRICAN AMERICAN): >60 ML/MIN/1.73 M^2
GLUCOSE SERPL-MCNC: 85 MG/DL
HCT VFR BLD AUTO: 33.4 %
HGB BLD-MCNC: 11 G/DL
LYMPHOCYTES # BLD AUTO: 1.2 K/UL
LYMPHOCYTES NFR BLD: 18.6 %
MCH RBC QN AUTO: 28.6 PG
MCHC RBC AUTO-ENTMCNC: 32.9 G/DL
MCV RBC AUTO: 87 FL
MONOCYTES # BLD AUTO: 0.6 K/UL
MONOCYTES NFR BLD: 9.2 %
NEUTROPHILS # BLD AUTO: 4.3 K/UL
NEUTROPHILS NFR BLD: 68.6 %
PLATELET # BLD AUTO: 173 K/UL
PMV BLD AUTO: 10.1 FL
POTASSIUM SERPL-SCNC: 3.3 MMOL/L
RBC # BLD AUTO: 3.84 M/UL
RH BLD: NORMAL
SODIUM SERPL-SCNC: 138 MMOL/L
TROPONIN I SERPL DL<=0.01 NG/ML-MCNC: 0.02 NG/ML
WBC # BLD AUTO: 6.33 K/UL

## 2017-08-18 PROCEDURE — 63600175 PHARM REV CODE 636 W HCPCS: Performed by: SURGERY

## 2017-08-18 PROCEDURE — 93010 ELECTROCARDIOGRAM REPORT: CPT | Mod: ,,, | Performed by: INTERNAL MEDICINE

## 2017-08-18 PROCEDURE — 88311 DECALCIFY TISSUE: CPT | Performed by: PATHOLOGY

## 2017-08-18 PROCEDURE — 88304 TISSUE EXAM BY PATHOLOGIST: CPT | Performed by: PATHOLOGY

## 2017-08-18 PROCEDURE — 03CL0Z6 EXTIRPATION OF MATTER FROM LEFT INTERNAL CAROTID ARTERY, BIFURCATION, OPEN APPROACH: ICD-10-PCS | Performed by: SURGERY

## 2017-08-18 PROCEDURE — 36000706: Performed by: SURGERY

## 2017-08-18 PROCEDURE — 27201423 OPTIME MED/SURG SUP & DEVICES STERILE SUPPLY: Performed by: SURGERY

## 2017-08-18 PROCEDURE — 93005 ELECTROCARDIOGRAM TRACING: CPT

## 2017-08-18 PROCEDURE — 97116 GAIT TRAINING THERAPY: CPT

## 2017-08-18 PROCEDURE — 97110 THERAPEUTIC EXERCISES: CPT

## 2017-08-18 PROCEDURE — 86850 RBC ANTIBODY SCREEN: CPT

## 2017-08-18 PROCEDURE — 27800903 OPTIME MED/SURG SUP & DEVICES OTHER IMPLANTS: Performed by: SURGERY

## 2017-08-18 PROCEDURE — 97535 SELF CARE MNGMENT TRAINING: CPT

## 2017-08-18 PROCEDURE — 71000039 HC RECOVERY, EACH ADD'L HOUR: Performed by: SURGERY

## 2017-08-18 PROCEDURE — 03UL0JZ SUPPLEMENT LEFT INTERNAL CAROTID ARTERY WITH SYNTHETIC SUBSTITUTE, OPEN APPROACH: ICD-10-PCS | Performed by: SURGERY

## 2017-08-18 PROCEDURE — 85025 COMPLETE CBC W/AUTO DIFF WBC: CPT

## 2017-08-18 PROCEDURE — 25000003 PHARM REV CODE 250: Performed by: SURGERY

## 2017-08-18 PROCEDURE — 25000003 PHARM REV CODE 250: Performed by: NURSE PRACTITIONER

## 2017-08-18 PROCEDURE — 88304 TISSUE EXAM BY PATHOLOGIST: CPT | Mod: 26,,, | Performed by: PATHOLOGY

## 2017-08-18 PROCEDURE — 88311 DECALCIFY TISSUE: CPT | Mod: 26,,, | Performed by: PATHOLOGY

## 2017-08-18 PROCEDURE — 63600175 PHARM REV CODE 636 W HCPCS: Performed by: INTERNAL MEDICINE

## 2017-08-18 PROCEDURE — 25500020 PHARM REV CODE 255: Performed by: SURGERY

## 2017-08-18 PROCEDURE — 71000033 HC RECOVERY, INTIAL HOUR: Performed by: SURGERY

## 2017-08-18 PROCEDURE — C9113 INJ PANTOPRAZOLE SODIUM, VIA: HCPCS | Performed by: EMERGENCY MEDICINE

## 2017-08-18 PROCEDURE — 86900 BLOOD TYPING SEROLOGIC ABO: CPT

## 2017-08-18 PROCEDURE — 25000003 PHARM REV CODE 250: Performed by: ANESTHESIOLOGY

## 2017-08-18 PROCEDURE — 63600175 PHARM REV CODE 636 W HCPCS: Performed by: ANESTHESIOLOGY

## 2017-08-18 PROCEDURE — 36000707: Performed by: SURGERY

## 2017-08-18 PROCEDURE — 82553 CREATINE MB FRACTION: CPT

## 2017-08-18 PROCEDURE — S0028 INJECTION, FAMOTIDINE, 20 MG: HCPCS | Performed by: ANESTHESIOLOGY

## 2017-08-18 PROCEDURE — 21400001 HC TELEMETRY ROOM

## 2017-08-18 PROCEDURE — 86901 BLOOD TYPING SEROLOGIC RH(D): CPT

## 2017-08-18 PROCEDURE — 37000009 HC ANESTHESIA EA ADD 15 MINS: Performed by: SURGERY

## 2017-08-18 PROCEDURE — 63600175 PHARM REV CODE 636 W HCPCS: Performed by: EMERGENCY MEDICINE

## 2017-08-18 PROCEDURE — 27000221 HC OXYGEN, UP TO 24 HOURS

## 2017-08-18 PROCEDURE — C1768 GRAFT, VASCULAR: HCPCS | Performed by: SURGERY

## 2017-08-18 PROCEDURE — 63600175 PHARM REV CODE 636 W HCPCS: Performed by: NURSE ANESTHETIST, CERTIFIED REGISTERED

## 2017-08-18 PROCEDURE — 97530 THERAPEUTIC ACTIVITIES: CPT

## 2017-08-18 PROCEDURE — 25000003 PHARM REV CODE 250: Performed by: INTERNAL MEDICINE

## 2017-08-18 PROCEDURE — 80048 BASIC METABOLIC PNL TOTAL CA: CPT

## 2017-08-18 PROCEDURE — 84484 ASSAY OF TROPONIN QUANT: CPT

## 2017-08-18 PROCEDURE — 37000008 HC ANESTHESIA 1ST 15 MINUTES: Performed by: SURGERY

## 2017-08-18 PROCEDURE — 25000003 PHARM REV CODE 250: Performed by: NURSE ANESTHETIST, CERTIFIED REGISTERED

## 2017-08-18 DEVICE — PATCH HEMASHIELD PLAT.8X7.6CM: Type: IMPLANTABLE DEVICE | Site: CAROTID | Status: FUNCTIONAL

## 2017-08-18 RX ORDER — ONDANSETRON 2 MG/ML
4 INJECTION INTRAMUSCULAR; INTRAVENOUS DAILY PRN
Status: DISCONTINUED | OUTPATIENT
Start: 2017-08-18 | End: 2017-08-18 | Stop reason: HOSPADM

## 2017-08-18 RX ORDER — SODIUM CHLORIDE 9 MG/ML
INJECTION, SOLUTION INTRAVENOUS CONTINUOUS PRN
Status: DISCONTINUED | OUTPATIENT
Start: 2017-08-18 | End: 2017-08-18

## 2017-08-18 RX ORDER — FENTANYL CITRATE 50 UG/ML
25 INJECTION, SOLUTION INTRAMUSCULAR; INTRAVENOUS EVERY 5 MIN PRN
Status: DISCONTINUED | OUTPATIENT
Start: 2017-08-18 | End: 2017-08-18 | Stop reason: HOSPADM

## 2017-08-18 RX ORDER — MIDAZOLAM HYDROCHLORIDE 1 MG/ML
INJECTION, SOLUTION INTRAMUSCULAR; INTRAVENOUS
Status: DISCONTINUED | OUTPATIENT
Start: 2017-08-18 | End: 2017-08-18

## 2017-08-18 RX ORDER — HEPARIN SODIUM 1000 [USP'U]/ML
INJECTION, SOLUTION INTRAVENOUS; SUBCUTANEOUS
Status: DISCONTINUED | OUTPATIENT
Start: 2017-08-18 | End: 2017-08-18 | Stop reason: HOSPADM

## 2017-08-18 RX ORDER — MORPHINE SULFATE 2 MG/ML
2 INJECTION, SOLUTION INTRAMUSCULAR; INTRAVENOUS
Status: DISCONTINUED | OUTPATIENT
Start: 2017-08-18 | End: 2017-08-19

## 2017-08-18 RX ORDER — FAMOTIDINE 20 MG/50ML
20 INJECTION, SOLUTION INTRAVENOUS ONCE
Status: COMPLETED | OUTPATIENT
Start: 2017-08-18 | End: 2017-08-18

## 2017-08-18 RX ORDER — ACETAMINOPHEN 10 MG/ML
1000 INJECTION, SOLUTION INTRAVENOUS ONCE
Status: COMPLETED | OUTPATIENT
Start: 2017-08-18 | End: 2017-08-18

## 2017-08-18 RX ORDER — NICARDIPINE HYDROCHLORIDE 0.2 MG/ML
INJECTION INTRAVENOUS CONTINUOUS PRN
Status: DISCONTINUED | OUTPATIENT
Start: 2017-08-18 | End: 2017-08-18

## 2017-08-18 RX ORDER — PROTAMINE SULFATE 10 MG/ML
INJECTION, SOLUTION INTRAVENOUS
Status: DISCONTINUED | OUTPATIENT
Start: 2017-08-18 | End: 2017-08-18

## 2017-08-18 RX ORDER — SODIUM CHLORIDE 9 MG/ML
50 INJECTION, SOLUTION INTRAVENOUS CONTINUOUS
Status: DISCONTINUED | OUTPATIENT
Start: 2017-08-18 | End: 2017-08-19

## 2017-08-18 RX ORDER — BACITRACIN ZINC 500 UNIT/G
OINTMENT (GRAM) TOPICAL
Status: DISCONTINUED | OUTPATIENT
Start: 2017-08-18 | End: 2017-08-18 | Stop reason: HOSPADM

## 2017-08-18 RX ORDER — CEFAZOLIN SODIUM 1 G/50ML
1 SOLUTION INTRAVENOUS
Status: COMPLETED | OUTPATIENT
Start: 2017-08-18 | End: 2017-08-19

## 2017-08-18 RX ORDER — FENTANYL CITRATE 50 UG/ML
INJECTION, SOLUTION INTRAMUSCULAR; INTRAVENOUS
Status: DISCONTINUED | OUTPATIENT
Start: 2017-08-18 | End: 2017-08-18

## 2017-08-18 RX ORDER — HYDROMORPHONE HYDROCHLORIDE 2 MG/ML
0.2 INJECTION, SOLUTION INTRAMUSCULAR; INTRAVENOUS; SUBCUTANEOUS EVERY 5 MIN PRN
Status: DISCONTINUED | OUTPATIENT
Start: 2017-08-18 | End: 2017-08-18 | Stop reason: HOSPADM

## 2017-08-18 RX ORDER — NEOSTIGMINE METHYLSULFATE 1 MG/ML
INJECTION, SOLUTION INTRAVENOUS
Status: DISCONTINUED | OUTPATIENT
Start: 2017-08-18 | End: 2017-08-18

## 2017-08-18 RX ORDER — ROCURONIUM BROMIDE 10 MG/ML
INJECTION, SOLUTION INTRAVENOUS
Status: DISCONTINUED | OUTPATIENT
Start: 2017-08-18 | End: 2017-08-18

## 2017-08-18 RX ORDER — MEPERIDINE HYDROCHLORIDE 50 MG/ML
12.5 INJECTION INTRAMUSCULAR; INTRAVENOUS; SUBCUTANEOUS ONCE AS NEEDED
Status: DISCONTINUED | OUTPATIENT
Start: 2017-08-18 | End: 2017-08-18 | Stop reason: HOSPADM

## 2017-08-18 RX ORDER — ETOMIDATE 2 MG/ML
INJECTION INTRAVENOUS
Status: DISCONTINUED | OUTPATIENT
Start: 2017-08-18 | End: 2017-08-18

## 2017-08-18 RX ORDER — LIDOCAINE HYDROCHLORIDE 20 MG/ML
INJECTION, SOLUTION EPIDURAL; INFILTRATION; INTRACAUDAL; PERINEURAL
Status: DISCONTINUED | OUTPATIENT
Start: 2017-08-18 | End: 2017-08-18

## 2017-08-18 RX ORDER — CEFAZOLIN SODIUM 1 G/3ML
INJECTION, POWDER, FOR SOLUTION INTRAMUSCULAR; INTRAVENOUS
Status: DISCONTINUED | OUTPATIENT
Start: 2017-08-18 | End: 2017-08-18 | Stop reason: HOSPADM

## 2017-08-18 RX ORDER — ONDANSETRON 2 MG/ML
4 INJECTION INTRAMUSCULAR; INTRAVENOUS EVERY 12 HOURS PRN
Status: DISCONTINUED | OUTPATIENT
Start: 2017-08-18 | End: 2017-08-21 | Stop reason: HOSPADM

## 2017-08-18 RX ORDER — FAMOTIDINE 20 MG/1
20 TABLET, FILM COATED ORAL 2 TIMES DAILY PRN
Status: DISCONTINUED | OUTPATIENT
Start: 2017-08-18 | End: 2017-08-21 | Stop reason: HOSPADM

## 2017-08-18 RX ORDER — LIDOCAINE HYDROCHLORIDE 10 MG/ML
INJECTION, SOLUTION EPIDURAL; INFILTRATION; INTRACAUDAL; PERINEURAL
Status: DISCONTINUED | OUTPATIENT
Start: 2017-08-18 | End: 2017-08-18 | Stop reason: HOSPADM

## 2017-08-18 RX ORDER — POTASSIUM CHLORIDE 20 MEQ/1
40 TABLET, EXTENDED RELEASE ORAL ONCE
Status: COMPLETED | OUTPATIENT
Start: 2017-08-18 | End: 2017-08-18

## 2017-08-18 RX ORDER — BUPIVACAINE HCL/EPINEPHRINE 0.25-.0005
VIAL (ML) INJECTION
Status: DISCONTINUED | OUTPATIENT
Start: 2017-08-18 | End: 2017-08-18 | Stop reason: HOSPADM

## 2017-08-18 RX ORDER — GLYCOPYRROLATE 0.2 MG/ML
INJECTION INTRAMUSCULAR; INTRAVENOUS
Status: DISCONTINUED | OUTPATIENT
Start: 2017-08-18 | End: 2017-08-18

## 2017-08-18 RX ADMIN — FAMOTIDINE 20 MG: 20 INJECTION, SOLUTION INTRAVENOUS at 06:08

## 2017-08-18 RX ADMIN — ACETAMINOPHEN 1000 MG: 10 INJECTION, SOLUTION INTRAVENOUS at 06:08

## 2017-08-18 RX ADMIN — POTASSIUM CHLORIDE 40 MEQ: 1500 TABLET, EXTENDED RELEASE ORAL at 09:08

## 2017-08-18 RX ADMIN — HYDROMORPHONE HYDROCHLORIDE 0.2 MG: 2 INJECTION, SOLUTION INTRAMUSCULAR; INTRAVENOUS; SUBCUTANEOUS at 05:08

## 2017-08-18 RX ADMIN — CEFAZOLIN SODIUM 1 G: 1 SOLUTION INTRAVENOUS at 10:08

## 2017-08-18 RX ADMIN — CLOPIDOGREL BISULFATE 75 MG: 75 TABLET ORAL at 09:08

## 2017-08-18 RX ADMIN — ETOMIDATE 16 MG: 2 INJECTION, SOLUTION INTRAVENOUS at 02:08

## 2017-08-18 RX ADMIN — NEOSTIGMINE METHYLSULFATE 5 MG: 1 INJECTION INTRAVENOUS at 04:08

## 2017-08-18 RX ADMIN — MORPHINE SULFATE 2 MG: 2 INJECTION, SOLUTION INTRAMUSCULAR; INTRAVENOUS at 11:08

## 2017-08-18 RX ADMIN — HYDROMORPHONE HYDROCHLORIDE 0.2 MG: 2 INJECTION, SOLUTION INTRAMUSCULAR; INTRAVENOUS; SUBCUTANEOUS at 04:08

## 2017-08-18 RX ADMIN — PRAVASTATIN SODIUM 80 MG: 20 TABLET ORAL at 09:08

## 2017-08-18 RX ADMIN — LOSARTAN POTASSIUM 100 MG: 50 TABLET, FILM COATED ORAL at 09:08

## 2017-08-18 RX ADMIN — PROTAMINE SULFATE 50 MG: 10 INJECTION, SOLUTION INTRAVENOUS at 03:08

## 2017-08-18 RX ADMIN — ROBINUL 1 MG: 0.2 INJECTION INTRAMUSCULAR; INTRAVENOUS at 04:08

## 2017-08-18 RX ADMIN — CARVEDILOL 6.25 MG: 6.25 TABLET, FILM COATED ORAL at 09:08

## 2017-08-18 RX ADMIN — PANTOPRAZOLE SODIUM 40 MG: 40 INJECTION, POWDER, FOR SOLUTION INTRAVENOUS at 09:08

## 2017-08-18 RX ADMIN — HYDROMORPHONE HYDROCHLORIDE 1 MG: 1 INJECTION, SOLUTION INTRAMUSCULAR; INTRAVENOUS; SUBCUTANEOUS at 09:08

## 2017-08-18 RX ADMIN — FENTANYL CITRATE 50 MCG: 50 INJECTION, SOLUTION INTRAMUSCULAR; INTRAVENOUS at 03:08

## 2017-08-18 RX ADMIN — MIDAZOLAM HYDROCHLORIDE 2 MG: 1 INJECTION, SOLUTION INTRAMUSCULAR; INTRAVENOUS at 02:08

## 2017-08-18 RX ADMIN — SODIUM CHLORIDE 50 ML/HR: 0.9 INJECTION, SOLUTION INTRAVENOUS at 05:08

## 2017-08-18 RX ADMIN — AMLODIPINE BESYLATE 10 MG: 10 TABLET ORAL at 09:08

## 2017-08-18 RX ADMIN — PHENYLEPHRINE HYDROCHLORIDE 50 MCG/MIN: 10 INJECTION INTRAVENOUS at 03:08

## 2017-08-18 RX ADMIN — SODIUM CHLORIDE: 0.9 INJECTION, SOLUTION INTRAVENOUS at 02:08

## 2017-08-18 RX ADMIN — NICARDIPINE HYDROCHLORIDE 5 MG/HR: 0.2 INJECTION, SOLUTION INTRAVENOUS at 03:08

## 2017-08-18 RX ADMIN — FENTANYL CITRATE 50 MCG: 50 INJECTION, SOLUTION INTRAMUSCULAR; INTRAVENOUS at 02:08

## 2017-08-18 RX ADMIN — FENTANYL CITRATE 150 MCG: 50 INJECTION, SOLUTION INTRAMUSCULAR; INTRAVENOUS at 02:08

## 2017-08-18 RX ADMIN — ROCURONIUM BROMIDE 50 MG: 10 INJECTION, SOLUTION INTRAVENOUS at 02:08

## 2017-08-18 RX ADMIN — LIDOCAINE HYDROCHLORIDE 80 MG: 20 INJECTION, SOLUTION EPIDURAL; INFILTRATION; INTRACAUDAL; PERINEURAL at 02:08

## 2017-08-18 RX ADMIN — SODIUM CHLORIDE, SODIUM LACTATE, POTASSIUM CHLORIDE, AND CALCIUM CHLORIDE: 600; 310; 30; 20 INJECTION, SOLUTION INTRAVENOUS at 02:08

## 2017-08-18 RX ADMIN — PROTAMINE SULFATE 25 MG: 10 INJECTION, SOLUTION INTRAVENOUS at 03:08

## 2017-08-18 RX ADMIN — ONDANSETRON 4 MG: 2 INJECTION INTRAMUSCULAR; INTRAVENOUS at 04:08

## 2017-08-18 RX ADMIN — CEFAZOLIN 2 G: 1 INJECTION, POWDER, FOR SOLUTION INTRAMUSCULAR; INTRAVENOUS at 02:08

## 2017-08-18 NOTE — ANESTHESIA POSTPROCEDURE EVALUATION
"Anesthesia Post Evaluation    Patient: Kodi Ribeiro    Procedure(s) Performed: Procedure(s) (LRB):  ENDARTERECTOMY-CAROTID (Left)    Final Anesthesia Type: general  Patient location during evaluation: PACU  Patient participation: Yes- Able to Participate  Level of consciousness: awake  Post-procedure vital signs: reviewed and stable  Pain management: adequate  Airway patency: patent      Nini-operative Events Comments: See progress note regarding post op chest pressure. EKG negative and enzymes.  Cardiovascular status: stable  Respiratory status: unassisted and nasal cannula  Hydration status: euvolemic  Follow-up not needed.        Visit Vitals  /72 (BP Location: Right arm, Patient Position: Lying)   Pulse (!) 54   Temp 36.5 °C (97.7 °F) (Skin)   Resp 17   Ht 6' 1" (1.854 m)   Wt 90.7 kg (199 lb 15.3 oz)   SpO2 98%   BMI 26.38 kg/m²       Pain/Rommel Score: Pain Assessment Performed: Yes (8/18/2017  6:15 PM)  Presence of Pain: complains of pain/discomfort (8/18/2017  6:15 PM)  Pain Rating Prior to Med Admin: 4 (8/18/2017  6:44 PM)  Pain Rating Post Med Admin: 6 (8/18/2017  5:45 PM)  Rommel Score: 9 (8/18/2017  6:15 PM)      "

## 2017-08-18 NOTE — PROGRESS NOTES
Post operatively, pt complained of substernal pressure and epigastric pain. Pt does have past medical history of CAD, PVD, and reflux. He denies symptoms being similar to previous MI. Out of caution. EKG done with show sinus jaime, vitals remained stable. Cardiac enzymes pending currently and will be followed up. IV famotidine also given in response of possible Reflux exacerbation. Otherwise pt stable of post operative pain treated with Dilaudid and IV acetaminophen.

## 2017-08-18 NOTE — NURSING
Pre op called to  pt, CT has not been done yet, notified Brennen ABBOTT, order changed to stat, contacted pre op that CT has to be done first. Will notify them after pt comes back.

## 2017-08-18 NOTE — PT/OT/SLP PROGRESS
Occupational Therapy  Treatment    Kodi Ribeiro   MRN: [id number]   Admitting Diagnosis: Small bowel obstruction due to adhesions    OT Date of Treatment: 08/18/17   OT Start Time: 0915  OT Stop Time: 0953  OT Total Time (min): 38 min    Billable Minutes:  Self Care/Home Management 15 MINUTES, Therapeutic Activity 8 MINUTES and Therapeutic Exercise 15 MINUTES    General Precautions: Standard, fall  Orthopedic Precautions: N/A  Braces: AFO (r le prosthesis)         Subjective:  Communicated with NURSE PEREZ AND EPIC CHART REVIEW prior to session.    Pain/Comfort  Pain Rating 1: 0/10    Objective:  Patient found with: peripheral IV     Functional Mobility:  Bed Mobility:  Rolling/Turning Right: Modified independent  Scooting/Bridging: Modified Independent  Supine to Sit: Modified Independent    Transfers:   Sit <> Stand Assistance: Minimum Assistance  Sit <> Stand Assistive Device: Rolling Walker  Bed <> Chair Technique: Stand Pivot  Bed <> Chair Transfer Assistance: Minimum Assistance  Bed <> Chair Assistive Device: Rolling Walker    Functional Ambulation: PT AMBULATED 80 FEET X 2 WITH MIN A RW AND VC'S FOR SAFETY, POSTURE AND CORRECT TECHNIQUE    Activities of Daily Living:     Feeding adaptive equipment: NA  UE Dressing Level of Assistance: Supervision  UE adaptive equipment: NA  LE Dressing Level of Assistance: Supervision  LE adaptive equipment: NA  Grooming Position: Seated, bedside chair  Grooming Level of Assistance: Supervision              Bathing adaptive equipment: NA    Balance:   Static Sit: GOOD-: Takes MODERATE challenges from all directions but inconsistently  Dynamic Sit: GOOD-: Maintains balance through MODERATE excursions of active trunk movement,     Static Stand: FAIR: Maintains without assist but unable to take challenges  Dynamic stand: POOR+  Therapeutic Activities and Exercises:  PT PERFORMED 2 SET X 10 REPS B UE ROM EXERCISE WITH 2 LB DOWEL IN ALL AVAILABLE PLANES AND RANGES SEATED IN BED  "SIDE CHAIR. PT LEFT IN CHAIR WITH CALL BUTTON IN REACH AND ALL NEED MET.    AM-PAC 6 CLICK ADL   How much help from another person does this patient currently need?   1 = Unable, Total/Dependent Assistance  2 = A lot, Maximum/Moderate Assistance  3 = A little, Minimum/Contact Guard/Supervision  4 = None, Modified [place]/Independent    Putting on and taking off regular lower body clothing? : 4  Bathing (including washing, rinsing, drying)?: 3  Toileting, which includes using toilet, bedpan, or urinal? : 3  Putting on and taking off regular upper body clothing?: 4  Taking care of personal grooming such as brushing teeth?: 4  Eating meals?: 4  Total Score: 22     AM-PAC Raw Score CMS "G-Code Modifier Level of Impairment Assistance   6 % Total / Unable   7 - 8 CM 80 - 100% Maximal Assist   9-13 CL 60 - 80% Moderate Assist   14 - 19 CK 40 - 60% Moderate Assist   20 - 22 CJ 20 - 40% Minimal Assist   23 CI 1-20% SBA / CGA   24 CH 0% Independent/ Mod I       Patient left up in chair with all lines intact, call button in reach and NURSE CHRIS notified    ASSESSMENT:  Kodi Ribeiro is a 67 y.o. male with a medical diagnosis of Small bowel obstruction due to adhesions and presents with IMPAIRED ADL'S DECREASE B UE STRENGTH/ENDURANCE. PT MAY CONTINUE TO BENEFIT FROM SKILLED O.T.    Rehab identified problem list/impairments: Rehab identified problem list/impairments: weakness, impaired functional mobilty, decreased safety awareness, impaired endurance, gait instability, impaired self care skills    Rehab potential is good.    Activity tolerance: Good    Discharge recommendations: Discharge Facility/Level Of Care Needs: rehabilitation facility     Barriers to discharge: Barriers to Discharge: None    Equipment recommendations: walker, rolling     GOALS:    Occupational Therapy Goals        Problem: Occupational Therapy Goal    Goal Priority Disciplines Outcome Interventions   Occupational Therapy Goal     OT, " PT/OT Ongoing (interventions implemented as appropriate)    Description:  OT GOALS TO BE MET 8-22-17  1. S WITH UE DRESSING  2. S WITH LE DRESSING  3. PT WILL TOLERATE 1 SET X 15 REPS B UE ROM EXERCISE                    Plan:  Patient to be seen 3 x/week to address the above listed problems via self-care/home management, therapeutic activities, therapeutic exercises  Plan of Care expires:    Plan of Care reviewed with: patient         Radha Armstrong OT  08/18/2017

## 2017-08-18 NOTE — PLAN OF CARE
Problem: Occupational Therapy Goal  Goal: Occupational Therapy Goal  OT GOALS TO BE MET 8-22-17  1. S WITH UE DRESSING  2. S WITH LE DRESSING  3. PT WILL TOLERATE 1 SET X 15 REPS B UE ROM EXERCISE   Outcome: Ongoing (interventions implemented as appropriate)   Steady improvements with (i) with adl's, functional mobility and t/f's

## 2017-08-18 NOTE — PROGRESS NOTES
[facility] -   Vascular Surgery  Progress Note    Patient Name: Kodi Ribeiro  MRN: [id number]  Admission Date: 8/11/2017  Primary Care Provider: Norma Nuñez MD    Subjective:     Interval History: small bowel obstruction; TIA    Post-Op Info:  Procedure(s) (LRB):  ENDARTERECTOMY-CAROTID (Left)   Day of Surgery      Medications:  Continuous Infusions:   lactated Ringers 125 mL/hr at 08/16/17 2253     Scheduled Meds:   amlodipine  10 mg Oral Daily    carvedilol  6.25 mg Oral BID WM    clopidogrel  75 mg Oral Daily    losartan  100 mg Oral Daily    pantoprazole  40 mg Intravenous BID    pravastatin  80 mg Oral QHS     PRN Meds:sodium chloride, acetaminophen, guaifenesin 100 mg/5 ml, hydrALAZINE, HYDROmorphone, ondansetron, promethazine (PHENERGAN) IVPB     Objective:     Vital Signs (Most Recent):  Temp: 97.6 °F (36.4 °C) (08/18/17 1225)  Pulse: 72 (08/18/17 1225)  Resp: 18 (08/18/17 1225)  BP: (!) 147/79 (08/18/17 1225)  SpO2: (!) 93 % (08/18/17 1225) Vital Signs (24h Range):  Temp:  [97.6 °F (36.4 °C)-98.9 °F (37.2 °C)] 97.6 °F (36.4 °C)  Pulse:  [70-81] 72  Resp:  [16-18] 18  SpO2:  [93 %-97 %] 93 %  BP: (147-167)/(67-79) 147/79       Date 08/18/17 0700 - 08/19/17 0659   Shift 6759-1031 9624-1603 8147-0580 24 Hour Total   I  N  T  A  K  E   P.O. 0   0    I.V.  (mL/kg) 750  (8.3)   750  (8.3)    IV Piggyback 0   0    Shift Total  (mL/kg) 750  (8.3)   750  (8.3)   O  U  T  P  U  T   Urine  (mL/kg/hr) 1400   1400    Shift Total  (mL/kg) 1400  (15.4)   1400  (15.4)   Weight (kg) 90.7 90.7 90.7 90.7       Physical Exam mild right hand weakness    Significant Labs:  All pertinent labs from the last 24 hours have been reviewed.    Significant Diagnostics:  I have reviewed all pertinent imaging results/findings within the past 24 hours.    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

## 2017-08-18 NOTE — ASSESSMENT & PLAN NOTE
- Appears to be resolved   - General Surgery following   - Supportive care  - IV fluids  - Pt reports large BM yesterday, passing flatus  - Advance diet

## 2017-08-18 NOTE — ASSESSMENT & PLAN NOTE
- Carotid US showed 90-99% stenosis within the left internal carotid artery.   - Vascular Surgery following.   - Plan for left carotid endarterectomy today

## 2017-08-18 NOTE — ASSESSMENT & PLAN NOTE
- CT head showed 1 cm area of hypodensity in the white matter of the left centrum semiovale  - Unable to do MRI brain   - Neurology following   - PT/OT eval  - resume statin/plavix  - CT head with and without   - CTA head  - ECHO

## 2017-08-18 NOTE — PHYSICIAN QUERY
"PT Name: Kodi Ribeiro  MR #: [id number]    Physician Query Form - Heart  Condition Clarification     CDS/: Mirela Quick RN               Contact information:[email or web address]  This form is a permanent document in the medical record.     Query Date: August 18, 2017    By submitting this query, we are merely seeking further clarification of documentation. Please utilize your independent clinical judgment when addressing the question(s) below.    The medical record contains the following   Indicators     Supporting Clinical Findings Location in Medical Record    BNP     x EF EF 60-65% 8/16 ECHO   x Radiology findings No acute cardiopulmonary findings.    8/11 CXR   x Echo Results 1 - Severe left atrial enlargement.   2 - Concentric remodeling.   3 - No wall motion abnormalities.   4 - Normal left ventricular systolic function (EF 60-65%).   5 - Impaired LV relaxation, normal LAP (grade 1 diastolic dysfunction).   6 - Normal right ventricular systolic function .   7 - The estimated PA systolic pressure is greater than 21 mmHg.    8/16 ECHO    "Ascites" documented      "SOB" or "MACKEY" documented      "Hypoxia" documented      Heart Failure documented      "Edema" documented     x Diuretics/Meds carvedilol   hydrALAZINE PRN  clopidogrel  MAR 8/15 - present  MAR 8/15 - present  MAR 8/15 - present    Treatment:     x Other:  ECHO showed EF 60% with diastolic dysfunction  5/17@ 5:03p Hospital Medicine PN       Provider, please specify diagnosis or diagnoses associated with above clinical findings.                               [ x ] Chronic Diastolic Heart Failure (EF > 40)*  [  ] Other Type of Heart Failure (please specify type): _________________________  [  ] Heart Failure Ruled Out  [  ] Other (please specify): ___________________________________  [  ] Clinically Undetermined            *American [employer] Association                                                                                          "                 Please document in your progress notes daily for the duration of treatment until resolved and include in your discharge summary.

## 2017-08-18 NOTE — PROGRESS NOTES
"[facility] Hospital Medicine  Progress Note    Patient Name: Kodi Ribeiro  MRN: [id number]  Patient Class: IP- Inpatient   Admission Date: 8/11/2017  Length of Stay: 7 days  Attending Physician: Karla Hutton MD  Primary Care Provider: Norma Nuñez MD        Subjective:     Principal Problem:Small bowel obstruction due to adhesions    HPI:  Mr. Ribeiro is a 66 y/o  male with h/o CAD, PAD, cardiac stents, CKD3, HTN, right BKA, AAA repair complicated by SBO, requiring surgery, left ureteral stent placement, presents to the ED c/o abdominal discomfort, nausea and vomiting since yesterday morning 1 AM. He thought it was "stomach bug" and would pass, but continued to get worse, hence presented to the ED. Last BM 3 days ago (wednesday).    X-Ray abdomen shows "several air-fluid levels within the abdomen.  There are dilated small bowel loops."    CT abdomen reveals "Mid to proximal small bowel loops are dilated, with marked distention of the stomach as well. Contrast noted in the distal esophagus, likely related to reflux. Distal small bowel loops are decompressed, with transition point noted at the lower midline retroperitoneum, at the site of prior surgery".     was contacted who will admit the patient. [facility] was consulted for medical management. Patient just had NG tube placed.    In the ED today, patient had coffee-ground emesis in the ED.  was consulted who recommend supportive care at this time.      Hospital Course:  8/12/17 No acute issues overnight. Pt reports pain is improved. Continue NG decompression. 8/13/17 Pt reports that he had " a really good bowel movement this morning". Pt reports improvement in symptoms. Continue NG compression. No current issues or complaints. ABD xray this morning showed increasing distention compared to prior exam. 8/14/17 The patients NG tube inadvertently came out overnight. Ok per primary team to leave it out. Pts last BM was yesterday, " pt is passing flatus. No current complaints. 8/15/17 The patient began complaining of RUE numbness yesterday. On exam strength was equal bilaterally. Head CT showed a vague 1 cm area of hypodensity in the white matter of the left centrum semiovale. Will obtain an MRI of the brain today and consult Neurology. 8/16/17 Pt unable to have MRI yesterday due to claustrophobia. Neurology recommends doing a CT head w wo and CTA head to view intracranial vessels. Carotid US showed 90-99% stenosis within the left internal carotid artery. Vascular Surgery consulted. 8/17/17 RUE numbness resolving. CTA head showed calcific atherosclerotic plaque of the bilateral cavernous internal carotid arteries with focal moderate to high grade stenoses at the supraclinoid ICAs bilaterally. Neurology following. Vascular following plans on intervention for left internal artery stenosis at some point. Upon discharge the patient will go to [facility]. Pt continuing to have bowel movements. 8/18/17 No acute issues overnight. Plan for a left carotid endarterectomy later today with vascular surgery.     Interval History: No acute issues overnight. Plan for a left carotid endarterectomy later today with vascular surgery.       Review of Systems   Constitutional: Negative.  Negative for activity change, appetite change, chills, fatigue, fever and unexpected weight change.   HENT: Negative.  Negative for congestion, facial swelling, nosebleeds, rhinorrhea, sinus pressure, sneezing and sore throat.    Eyes: Negative.  Negative for photophobia, discharge, redness and visual disturbance.   Respiratory: Negative.  Negative for apnea, cough, chest tightness, shortness of breath, wheezing and stridor.    Cardiovascular: Negative.  Negative for chest pain, palpitations and leg swelling.   Gastrointestinal: Positive for abdominal pain (discomfort), nausea and vomiting. Negative for abdominal distention, anal bleeding, blood in stool, constipation and  diarrhea.   Endocrine: Negative.  Negative for polydipsia, polyphagia and polyuria.   Genitourinary: Negative.  Negative for difficulty urinating, discharge, dysuria, flank pain, frequency, hematuria and urgency.   Musculoskeletal: Negative.  Negative for arthralgias, back pain, gait problem, joint swelling, myalgias, neck pain and neck stiffness.   Skin: Negative.  Negative for color change, pallor and rash.   Allergic/Immunologic: Negative.  Negative for environmental allergies, food allergies and immunocompromised state.   Neurological: Positive for numbness. Negative for dizziness, tremors, seizures, syncope, facial asymmetry, speech difficulty, weakness, light-headedness and headaches.        LUE numbness   Hematological: Negative.    Psychiatric/Behavioral: Negative.  Negative for behavioral problems, confusion, hallucinations and suicidal ideas. The patient is not nervous/anxious.    All other systems reviewed and are negative.    Objective:     Vital Signs (Most Recent):  Temp: 98.4 °F (36.9 °C) (08/18/17 0729)  Pulse: 80 (08/18/17 0729)  Resp: 18 (08/18/17 0729)  BP: (!) 147/73 (08/18/17 0729)  SpO2: 96 % (08/18/17 0729) Vital Signs (24h Range):  Temp:  [97.5 °F (36.4 °C)-98.9 °F (37.2 °C)] 98.4 °F (36.9 °C)  Pulse:  [70-81] 80  Resp:  [16-18] 18  SpO2:  [94 %-97 %] 96 %  BP: (129-167)/(63-79) 147/73     Weight: 90.7 kg (199 lb 15.3 oz)  Body mass index is 26.38 kg/m².    Intake/Output Summary (Last 24 hours) at 08/18/17 1100  Last data filed at 08/18/17 1039   Gross per 24 hour   Intake          3357.92 ml   Output             2750 ml   Net           607.92 ml      Physical Exam   Constitutional: He is oriented to person, place, and time. He appears well-developed and well-nourished. No distress.   Appears uncomfortable.   HENT:   Head: Normocephalic and atraumatic.   Eyes: Conjunctivae and EOM are normal. Pupils are equal, round, and reactive to light. No scleral icterus.   Neck: Normal range of motion.  Neck supple. No thyromegaly present.   Cardiovascular: Normal rate, regular rhythm, normal heart sounds and intact distal pulses.    No murmur heard.  Pulmonary/Chest: Effort normal and breath sounds normal. No respiratory distress. He has no wheezes. He exhibits no tenderness.   Abdominal: Soft. He exhibits no distension. Bowel sounds are decreased. There is no tenderness.   Musculoskeletal: Normal range of motion. He exhibits no edema, tenderness or deformity.   Right BKA, left foot amputation   Lymphadenopathy:     He has no cervical adenopathy.   Neurological: He is alert and oriented to person, place, and time. No cranial nerve deficit. He exhibits normal muscle tone. Coordination normal.   Patient is alert and oriented to person, place and time. Pupils ERRL and EOM normal. No cranial nerve deficit and cranial nerves II-XII are intact. Strength is full bilaterally; it is equal and 5/5 in bilateral upper and lower extremities. There is no pronator drift of outstretched arms. Light touch sense is intact. Speech is clear and normal. DTRs are normal and symmetric; they are 2+ and equal bilaterally. No acute focal neurological deficits noted.      Skin: Skin is warm and dry. No rash noted. He is not diaphoretic. No erythema.   Psychiatric: He has a normal mood and affect. His behavior is normal. Judgment and thought content normal.   Nursing note and vitals reviewed.      Significant Labs: All pertinent labs within the past 24 hours have been reviewed.    Significant Imaging:   Imaging Results          CTA Head (Final result)  Result time 08/16/17 18:43:23    Final result by Will Nelson MD (08/16/17 18:43:23)                 Impression:         1.  No evidence for intracranial aneurysm or AVM.  2.  Calcific atherosclerotic plaque of the bilateral cavernous internal carotid arteries with focal moderate to high grade stenoses at the supraclinoid ICAs bilaterally.  3.  No emboli or filling defects or thrombus seen  in the intracranial arterial circulation.    All CT scans at this facility use dose modulation, iterative reconstruction and/or weight based dosing when appropriate to reduce radiation dose to as low as reasonably achievable.      Electronically signed by: KITTY ALFORD MD  Date:     08/16/17  Time:    18:43              Narrative:    Exam: CT angiogram of the head with and without intravenous contrast    Clinical History:  CVA. Right upper extremity weakness.     Technique: The head was scanned both before and after the intravenous administration of 75 cc of Isovue 370..     Findings:    No hydrocephalus, midline shift, mass effect, or acute intracranial hemorrhage. The visualized paranasal sinuses and mastoid air cells are clear. The skull is intact.    The angiogram shows no intracranial aneurysm or AVM.  There is no abnormal intracranial enhancement with contrast administration.  There is no arterial occlusion or truncation or filling defect.  There are bilateral calcifications of the cavernous ICAs with a focal moderate to high grade stenosis at the supraclinoid right ICA.  There is a focal moderate to high grade stenosis of the supraclinoid left ICA from the calcific plaque.  No focal stenosis is seen in the posterior circulation.  No evidence for vasculitis.  The bilateral internal cerebral veins and dural venous sinuses are patent.                             US Carotid Bilateral (Final result)     Abnormal  Result time 08/16/17 12:08:20    Final result by Aurelio Curtis MD (08/16/17 12:08:20)                 Impression:        90-99% stenosis within the left internal carotid artery. The patient's nurse was notified at 12 PM on 8/16/17.    **Categories of stenosis (0-15%, 16-49%, 50-69% and 70-99% ) are based on published criteria that have been internally validated.  Degree of stenosis is based on NASCET criteria and refers to the degree of luminal diameter narrowing as a percentage of the normal ICA distal  to the stenosis and any area of post stenotic dilation.        Electronically signed by: BHUMI LAU MD  Date:     08/16/17  Time:    12:08              Narrative:    BILATERAL CAROTID DUPLEX ULTRASOUND.    Comparison: None    History:  Stroke    FINDINGS:     Right common carotid:   Peak systolic velocity 155 cm/sec.    Right internal carotid artery: Mild plaque is seen in the bulb    Peak systolic velocity: 123 cm/sec.   IC/CC ratio:  0.8.    Left common carotid:   Peak systolic velocity 67 cm/sec.    Left internal carotid artery: Significant plaque is seen    Peak systolic velocity 582  cm/sec.    IC/CC ratio 8.6.    Both vertebral arteries demonstrate antegrade flow.                             X-Ray Abdomen Flat And Erect (Final result)  Result time 08/15/17 11:37:29    Final result by RADHA Medellin Sr., MD (08/15/17 11:37:29)                 Impression:      1. There are dilated loops of small bowel. In the expected location of the mid to distal portion of the ileum there is a dilated loop of small bowel with a diameter of 4.1 cm. On the prior examination it measured 4.4 cm. This is consistent with the patient's history of a small bowel obstruction.  2. There is a suspected compression fracture of the L1 vertebral body.  3. Surgical changes      Electronically signed by: RADHA MEDELLIN MD  Date:     08/15/17  Time:    11:37              Narrative:    Flat and erect KUB    History: small bowel obstruction; resolving    Finding: Comparison was made to a prior examination performed on 8/13/2017. There are dilated loops of small bowel. In the expected location of the mid to distal portion of the ileum there is a dilated loop of small bowel with a diameter of 4.1 cm. On the prior examination it measured 4.4 cm. There is no pneumoperitoneum. There is a suspected compression fracture of the L1 vertebral body. There are surgical clips projected over the abdomen and pelvis. There is a left ureteral catheter again  visualized. There is partial visualization of a vascular stent in the expected location of the left superficial femoral artery.                             CT Head Without Contrast (Final result)  Result time 08/14/17 18:12:27    Final result by Dwayne Nowak Jr., MD (08/14/17 18:12:27)                 Impression:     Small subtle zone of hypodensity in the left centrum semiovale white matter, with possibilities as above.  No acute hemorrhage is noted.      All CT scans at this facility use dose modulation, iterative reconstruction, and/or weight based dosing when appropriate to reduce radiation dose to as low as reasonably achievable.        Electronically signed by: DWAYNE NOWAK MD  Date:     08/14/17  Time:    18:12              Narrative:    Exam: CT HEAD WITHOUT CONTRAST    History:  Right upper extremity numbness/weakness      Technique: Noncontrast axial images of the head were obtained.  Motion artifact.  A satisfactory exam was acquired.    Comparison:None    Findings: Vague 1 cm area of hypodensity in the white matter of the left centrum semiovale, image 20 series 5.  Possibilities include an area of microvascular ischemic change or subacute lacunar ischemic infarct.  Ventricular system is normal.  No hydrocephalus.  No midline shift.  No abnormal density to indicate acute major vascular distribution ischemic infarction or hemorrhage.  No mass effect.  No extra-axial fluid collections.     Visualized paranasal sinuses and mastoid air cells appear clear.      No calvarial fracture.                             X-Ray Abdomen Flat And Erect (Final result)  Result time 08/13/17 09:56:39    Final result by Freddie Nelson MD (08/13/17 09:56:39)                 Impression:     Small bowel obstruction.  Increasing distention compared to prior exam.      Electronically signed by: FREDDIE NELSON MD  Date:     08/13/17  Time:    09:56              Narrative:    PROCEDURE: XR ABDOMEN FLAT AND ERECT, 08/13/17  08:43:02    HISTORY:  small bowel obstruction    COMPARISON STUDIES: August 11, 2017    FINDINGS:   Diffusely dilated small bowel loops throughout the abdomen with maximum diameter of approximately 4.4 cm.  Scattered surgical clips.  Small amounts of air in the colon with some contrast in the lower left colon.  Nasogastric tube present.  left sided nephroureteral stent.                             X-Ray Chest 1 View (Final result)  Result time 08/11/17 19:56:15    Final result by Stefan Gagnon MD (08/11/17 19:56:15)                 Impression:     Tip of the NG tube not visualized. See above.      Electronically signed by: STEFAN GAGNON MD  Date:     08/11/17  Time:    19:56              Narrative:    Exam: Chest X-ray, one view.    History: Encounter for fitting and adjustment of other gastrointestinal appliance and device    Findings: Comparison is made to prior examination at 1536 hrs. NG tube has been placed, extending into the distal esophagus, tip not visualized. Consider AP view of the abdomen for further assessment.                             CT Abdomen Pelvis  Without Contrast (Final result)  Result time 08/11/17 18:38:49   Procedure changed from CT Abdomen Pelvis With Contrast     Final result by Stefan Gagnon MD (08/11/17 18:38:49)                 Impression:     Mid small bowel obstruction, likely related to postoperative adhesions. See above. No free air or pneumatosis identified.      Electronically signed by: STEFAN GAGNON MD  Date:     08/11/17  Time:    18:38              Narrative:    Examination: CT of the abdomen and pelvis without contrast.    History: Vomiting    Findings: Comparison is made to prior examination dated 03/20/2000 1534 she kidney again noted. Left ureteral stent remains in place. Postoperative changes again noted in the midline retroperitoneum.    Mid to proximal small bowel loops are dilated, with marked distention of the stomach as well. Contrast noted in the distal  esophagus, likely related to reflux. Distal small bowel loops are decompressed, with transition point noted at the lower midline retroperitoneum, at the site of prior surgery. No free air or pneumatosis, and otherwise no significant change from prior exam.                             X-Ray Abdomen Flat And Erect (Final result)  Result time 08/11/17 15:53:16    Final result by Hill Mario MD (08/11/17 15:53:16)                 Impression:      Bowel gas pattern may be due to an ileus.  Small bowel obstruction not excluded.    Other findings as described above.      Electronically signed by: HILL MARIO MD  Date:     08/11/17  Time:    15:53              Narrative:    EXAM:  2 view abdomen, flat and erect    CLINICAL HISTORY: abdominal pain, unspecified without report of trauma    COMPARISON STUDIES: Abdominal series 04/22/2016    FINDINGS:    Abdomen: No definite free air is seen. Several surgical clips within the midabdomen.  Left ureteral stent.  atherosclerosis.    There are several air-fluid levels within the abdomen.  There are dilated small bowel loops..                             X-Ray Chest PA And Lateral (Final result)  Result time 08/11/17 15:51:11    Final result by Hill Mario MD (08/11/17 15:51:11)                 Impression:      No acute cardiopulmonary findings.  Please see above      Electronically signed by: HILL MARIO MD  Date:     08/11/17  Time:    15:51              Narrative:    EXAM: Chest X-ray PA and Lateral    CLINICAL HISTORY:     Vomiting, unspecified    COMPARISON STUDIES:     04/24/2017    FINDINGS:    Surgical clips in the right axilla.  There are surgical staples in the right lung.  Chronic blunting of the right costophrenic sulcus.  Left lung appears clear.  Normal heart size.. Chronic right rib deformities probably from previous thoracotomy.  Coronary artery stent.  Chronic compression of one of the midthoracic vertebral bodies.  Surgical staples and a ureteral  stent is noted on the lateral view..                                 Assessment/Plan:      * Small bowel obstruction due to adhesions    - Appears to be resolved   - General Surgery following   - Supportive care  - IV fluids  - Pt reports large BM yesterday, passing flatus  - Advance diet         Left carotid artery stenosis    - Carotid US showed 90-99% stenosis within the left internal carotid artery.   - Vascular Surgery following.   - Plan for left carotid endarterectomy today        Cerebral infarction due to embolism of left middle cerebral artery    - Neurology following   - Unable to get MRI due to claustrophobia  - CT head with and without not done yesterday  - CTA head calcific atherosclerotic plaque of the bilateral cavernous internal carotid arteries with focal moderate to high grade stenoses at the supraclinoid ICAs bilaterally.  - ECHO showed EF 60% with diastolic dysfunction           Right upper extremity numbness    - CT head showed 1 cm area of hypodensity in the white matter of the left centrum semiovale  - Unable to do MRI brain   - Neurology following   - PT/OT eval  - resume statin/plavix  - CT head with and without   - CTA head  - ECHO             Hypokalemia    - K+ 3.3  - monitor           Coffee ground emesis    - resolved   - Likely due to persistent vomiting.  - Protonix IV daily for now.  - monitor H/H        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    - H/o Right BKA          CAD;Old MI ; s/p stents x 3 (2000)     - Resume statin/plavix  - Denies chest pain or SOB at this time.          CKD (chronic kidney disease) stage 3, GFR 30-59 ml/min    - Serum creatinine at baseline  - Gentle IV hydration  - Monitor, labs in AM          Essential hypertension    - Hydralazine IV prn  - Resume oral meds  - Monitor and adjust as needed            VTE Risk Mitigation         Ordered     Medium Risk of VTE  Once      08/11/17 2210     Place sequential compression device  Until  discontinued      08/11/17 2210     Reason for No Pharmacological VTE Prophylaxis  Once      08/11/17 2210     Place sequential compression device  Until discontinued      08/11/17 2210              Brennen Weiner NP  Department of Hospital Medicine   [facility]

## 2017-08-18 NOTE — SUBJECTIVE & OBJECTIVE
Interval History: No acute issues overnight. Plan for a left carotid endarterectomy later today with vascular surgery.       Review of Systems   Constitutional: Negative.  Negative for activity change, appetite change, chills, fatigue, fever and unexpected weight change.   HENT: Negative.  Negative for congestion, facial swelling, nosebleeds, rhinorrhea, sinus pressure, sneezing and sore throat.    Eyes: Negative.  Negative for photophobia, discharge, redness and visual disturbance.   Respiratory: Negative.  Negative for apnea, cough, chest tightness, shortness of breath, wheezing and stridor.    Cardiovascular: Negative.  Negative for chest pain, palpitations and leg swelling.   Gastrointestinal: Positive for abdominal pain (discomfort), nausea and vomiting. Negative for abdominal distention, anal bleeding, blood in stool, constipation and diarrhea.   Endocrine: Negative.  Negative for polydipsia, polyphagia and polyuria.   Genitourinary: Negative.  Negative for difficulty urinating, discharge, dysuria, flank pain, frequency, hematuria and urgency.   Musculoskeletal: Negative.  Negative for arthralgias, back pain, gait problem, joint swelling, myalgias, neck pain and neck stiffness.   Skin: Negative.  Negative for color change, pallor and rash.   Allergic/Immunologic: Negative.  Negative for environmental allergies, food allergies and immunocompromised state.   Neurological: Positive for numbness. Negative for dizziness, tremors, seizures, syncope, facial asymmetry, speech difficulty, weakness, light-headedness and headaches.        LUE numbness   Hematological: Negative.    Psychiatric/Behavioral: Negative.  Negative for behavioral problems, confusion, hallucinations and suicidal ideas. The patient is not nervous/anxious.    All other systems reviewed and are negative.    Objective:     Vital Signs (Most Recent):  Temp: 98.4 °F (36.9 °C) (08/18/17 0729)  Pulse: 80 (08/18/17 0729)  Resp: 18 (08/18/17 0729)  BP: (!)  147/73 (08/18/17 0729)  SpO2: 96 % (08/18/17 0729) Vital Signs (24h Range):  Temp:  [97.5 °F (36.4 °C)-98.9 °F (37.2 °C)] 98.4 °F (36.9 °C)  Pulse:  [70-81] 80  Resp:  [16-18] 18  SpO2:  [94 %-97 %] 96 %  BP: (129-167)/(63-79) 147/73     Weight: 90.7 kg (199 lb 15.3 oz)  Body mass index is 26.38 kg/m².    Intake/Output Summary (Last 24 hours) at 08/18/17 1100  Last data filed at 08/18/17 1039   Gross per 24 hour   Intake          3357.92 ml   Output             2750 ml   Net           607.92 ml      Physical Exam   Constitutional: He is oriented to person, place, and time. He appears well-developed and well-nourished. No distress.   Appears uncomfortable.   HENT:   Head: Normocephalic and atraumatic.   Eyes: Conjunctivae and EOM are normal. Pupils are equal, round, and reactive to light. No scleral icterus.   Neck: Normal range of motion. Neck supple. No thyromegaly present.   Cardiovascular: Normal rate, regular rhythm, normal heart sounds and intact distal pulses.    No murmur heard.  Pulmonary/Chest: Effort normal and breath sounds normal. No respiratory distress. He has no wheezes. He exhibits no tenderness.   Abdominal: Soft. He exhibits no distension. Bowel sounds are decreased. There is no tenderness.   Musculoskeletal: Normal range of motion. He exhibits no edema, tenderness or deformity.   Right BKA, left foot amputation   Lymphadenopathy:     He has no cervical adenopathy.   Neurological: He is alert and oriented to person, place, and time. No cranial nerve deficit. He exhibits normal muscle tone. Coordination normal.   Patient is alert and oriented to person, place and time. Pupils ERRL and EOM normal. No cranial nerve deficit and cranial nerves II-XII are intact. Strength is full bilaterally; it is equal and 5/5 in bilateral upper and lower extremities. There is no pronator drift of outstretched arms. Light touch sense is intact. Speech is clear and normal. DTRs are normal and symmetric; they are 2+ and  equal bilaterally. No acute focal neurological deficits noted.      Skin: Skin is warm and dry. No rash noted. He is not diaphoretic. No erythema.   Psychiatric: He has a normal mood and affect. His behavior is normal. Judgment and thought content normal.   Nursing note and vitals reviewed.      Significant Labs: All pertinent labs within the past 24 hours have been reviewed.    Significant Imaging:   Imaging Results          CTA Head (Final result)  Result time 08/16/17 18:43:23    Final result by Will Nelson MD (08/16/17 18:43:23)                 Impression:         1.  No evidence for intracranial aneurysm or AVM.  2.  Calcific atherosclerotic plaque of the bilateral cavernous internal carotid arteries with focal moderate to high grade stenoses at the supraclinoid ICAs bilaterally.  3.  No emboli or filling defects or thrombus seen in the intracranial arterial circulation.    All CT scans at this facility use dose modulation, iterative reconstruction and/or weight based dosing when appropriate to reduce radiation dose to as low as reasonably achievable.      Electronically signed by: WILL NELSON MD  Date:     08/16/17  Time:    18:43              Narrative:    Exam: CT angiogram of the head with and without intravenous contrast    Clinical History:  CVA. Right upper extremity weakness.     Technique: The head was scanned both before and after the intravenous administration of 75 cc of Isovue 370..     Findings:    No hydrocephalus, midline shift, mass effect, or acute intracranial hemorrhage. The visualized paranasal sinuses and mastoid air cells are clear. The skull is intact.    The angiogram shows no intracranial aneurysm or AVM.  There is no abnormal intracranial enhancement with contrast administration.  There is no arterial occlusion or truncation or filling defect.  There are bilateral calcifications of the cavernous ICAs with a focal moderate to high grade stenosis at the supraclinoid right ICA.  There  is a focal moderate to high grade stenosis of the supraclinoid left ICA from the calcific plaque.  No focal stenosis is seen in the posterior circulation.  No evidence for vasculitis.  The bilateral internal cerebral veins and dural venous sinuses are patent.                             US Carotid Bilateral (Final result)     Abnormal  Result time 08/16/17 12:08:20    Final result by Aurelio Lau MD (08/16/17 12:08:20)                 Impression:        90-99% stenosis within the left internal carotid artery. The patient's nurse was notified at 12 PM on 8/16/17.    **Categories of stenosis (0-15%, 16-49%, 50-69% and 70-99% ) are based on published criteria that have been internally validated.  Degree of stenosis is based on NASCET criteria and refers to the degree of luminal diameter narrowing as a percentage of the normal ICA distal to the stenosis and any area of post stenotic dilation.        Electronically signed by: AURELIO LAU MD  Date:     08/16/17  Time:    12:08              Narrative:    BILATERAL CAROTID DUPLEX ULTRASOUND.    Comparison: None    History:  Stroke    FINDINGS:     Right common carotid:   Peak systolic velocity 155 cm/sec.    Right internal carotid artery: Mild plaque is seen in the bulb    Peak systolic velocity: 123 cm/sec.   IC/CC ratio:  0.8.    Left common carotid:   Peak systolic velocity 67 cm/sec.    Left internal carotid artery: Significant plaque is seen    Peak systolic velocity 582  cm/sec.    IC/CC ratio 8.6.    Both vertebral arteries demonstrate antegrade flow.                             X-Ray Abdomen Flat And Erect (Final result)  Result time 08/15/17 11:37:29    Final result by RADHA Medellin Sr., MD (08/15/17 11:37:29)                 Impression:      1. There are dilated loops of small bowel. In the expected location of the mid to distal portion of the ileum there is a dilated loop of small bowel with a diameter of 4.1 cm. On the prior examination it measured 4.4  cm. This is consistent with the patient's history of a small bowel obstruction.  2. There is a suspected compression fracture of the L1 vertebral body.  3. Surgical changes      Electronically signed by: RADHA RODRIGUEZ MD  Date:     08/15/17  Time:    11:37              Narrative:    Flat and erect KUB    History: small bowel obstruction; resolving    Finding: Comparison was made to a prior examination performed on 8/13/2017. There are dilated loops of small bowel. In the expected location of the mid to distal portion of the ileum there is a dilated loop of small bowel with a diameter of 4.1 cm. On the prior examination it measured 4.4 cm. There is no pneumoperitoneum. There is a suspected compression fracture of the L1 vertebral body. There are surgical clips projected over the abdomen and pelvis. There is a left ureteral catheter again visualized. There is partial visualization of a vascular stent in the expected location of the left superficial femoral artery.                             CT Head Without Contrast (Final result)  Result time 08/14/17 18:12:27    Final result by Dwayne Nowak Jr., MD (08/14/17 18:12:27)                 Impression:     Small subtle zone of hypodensity in the left centrum semiovale white matter, with possibilities as above.  No acute hemorrhage is noted.      All CT scans at this facility use dose modulation, iterative reconstruction, and/or weight based dosing when appropriate to reduce radiation dose to as low as reasonably achievable.        Electronically signed by: DWAYNE NOWAK MD  Date:     08/14/17  Time:    18:12              Narrative:    Exam: CT HEAD WITHOUT CONTRAST    History:  Right upper extremity numbness/weakness      Technique: Noncontrast axial images of the head were obtained.  Motion artifact.  A satisfactory exam was acquired.    Comparison:None    Findings: Vague 1 cm area of hypodensity in the white matter of the left centrum semiovale, image 20 series 5.   Possibilities include an area of microvascular ischemic change or subacute lacunar ischemic infarct.  Ventricular system is normal.  No hydrocephalus.  No midline shift.  No abnormal density to indicate acute major vascular distribution ischemic infarction or hemorrhage.  No mass effect.  No extra-axial fluid collections.     Visualized paranasal sinuses and mastoid air cells appear clear.      No calvarial fracture.                             X-Ray Abdomen Flat And Erect (Final result)  Result time 08/13/17 09:56:39    Final result by Freddie Nelson MD (08/13/17 09:56:39)                 Impression:     Small bowel obstruction.  Increasing distention compared to prior exam.      Electronically signed by: FREDDIE NELSON MD  Date:     08/13/17  Time:    09:56              Narrative:    PROCEDURE: XR ABDOMEN FLAT AND ERECT, 08/13/17 08:43:02    HISTORY:  small bowel obstruction    COMPARISON STUDIES: August 11, 2017    FINDINGS:   Diffusely dilated small bowel loops throughout the abdomen with maximum diameter of approximately 4.4 cm.  Scattered surgical clips.  Small amounts of air in the colon with some contrast in the lower left colon.  Nasogastric tube present.  left sided nephroureteral stent.                             X-Ray Chest 1 View (Final result)  Result time 08/11/17 19:56:15    Final result by Stefan Gagnon MD (08/11/17 19:56:15)                 Impression:     Tip of the NG tube not visualized. See above.      Electronically signed by: STEFAN GAGNON MD  Date:     08/11/17  Time:    19:56              Narrative:    Exam: Chest X-ray, one view.    History: Encounter for fitting and adjustment of other gastrointestinal appliance and device    Findings: Comparison is made to prior examination at 1536 hrs. NG tube has been placed, extending into the distal esophagus, tip not visualized. Consider AP view of the abdomen for further assessment.                             CT Abdomen Pelvis  Without Contrast  (Final result)  Result time 08/11/17 18:38:49   Procedure changed from CT Abdomen Pelvis With Contrast     Final result by Stefan Gagnon MD (08/11/17 18:38:49)                 Impression:     Mid small bowel obstruction, likely related to postoperative adhesions. See above. No free air or pneumatosis identified.      Electronically signed by: STEFAN GAGNON MD  Date:     08/11/17  Time:    18:38              Narrative:    Examination: CT of the abdomen and pelvis without contrast.    History: Vomiting    Findings: Comparison is made to prior examination dated 03/20/2000 1534 she kidney again noted. Left ureteral stent remains in place. Postoperative changes again noted in the midline retroperitoneum.    Mid to proximal small bowel loops are dilated, with marked distention of the stomach as well. Contrast noted in the distal esophagus, likely related to reflux. Distal small bowel loops are decompressed, with transition point noted at the lower midline retroperitoneum, at the site of prior surgery. No free air or pneumatosis, and otherwise no significant change from prior exam.                             X-Ray Abdomen Flat And Erect (Final result)  Result time 08/11/17 15:53:16    Final result by Hill Gastelum MD (08/11/17 15:53:16)                 Impression:      Bowel gas pattern may be due to an ileus.  Small bowel obstruction not excluded.    Other findings as described above.      Electronically signed by: HILL GASTELUM MD  Date:     08/11/17  Time:    15:53              Narrative:    EXAM:  2 view abdomen, flat and erect    CLINICAL HISTORY: abdominal pain, unspecified without report of trauma    COMPARISON STUDIES: Abdominal series 04/22/2016    FINDINGS:    Abdomen: No definite free air is seen. Several surgical clips within the midabdomen.  Left ureteral stent.  atherosclerosis.    There are several air-fluid levels within the abdomen.  There are dilated small bowel loops..                              X-Ray Chest PA And Lateral (Final result)  Result time 08/11/17 15:51:11    Final result by Hill Mario MD (08/11/17 15:51:11)                 Impression:      No acute cardiopulmonary findings.  Please see above      Electronically signed by: HILL MARIO MD  Date:     08/11/17  Time:    15:51              Narrative:    EXAM: Chest X-ray PA and Lateral    CLINICAL HISTORY:     Vomiting, unspecified    COMPARISON STUDIES:     04/24/2017    FINDINGS:    Surgical clips in the right axilla.  There are surgical staples in the right lung.  Chronic blunting of the right costophrenic sulcus.  Left lung appears clear.  Normal heart size.. Chronic right rib deformities probably from previous thoracotomy.  Coronary artery stent.  Chronic compression of one of the midthoracic vertebral bodies.  Surgical staples and a ureteral stent is noted on the lateral view..

## 2017-08-18 NOTE — TRANSFER OF CARE
"Anesthesia Transfer of Care Note    Patient: Kodi Ribeiro    Procedure(s) Performed: Procedure(s) (LRB):  ENDARTERECTOMY-CAROTID (Left)    Patient location: PACU    Anesthesia Type: general    Transport from OR: Transported from OR on room air with adequate spontaneous ventilation    Post pain: adequate analgesia    Post assessment: no apparent anesthetic complications    Post vital signs: stable    Level of consciousness: awake    Nausea/Vomiting: no nausea/vomiting    Complications: none    Transfer of care protocol was followed      Last vitals:   Visit Vitals  BP (!) 147/79 (BP Location: Right arm, Patient Position: Lying)   Pulse 72   Temp 36.4 °C (97.6 °F) (Oral)   Resp 18   Ht 6' 1" (1.854 m)   Wt 90.7 kg (199 lb 15.3 oz)   SpO2 (!) 93%   BMI 26.38 kg/m²     "

## 2017-08-18 NOTE — BRIEF OP NOTE
[facility] -   Brief Operative Note    SUMMARY     Surgery Date: 8/18/2017     Surgeon(s) and Role:     * Stefan Jamil MD - Primary    Assisting Surgeon: None    Pre-op Diagnosis:  Arteriosclerosis of carotid artery, left [I65.22]    Post-op Diagnosis:  Post-Op Diagnosis Codes:     * Arteriosclerosis of carotid artery, left [I65.22]    Procedure(s) (LRB):  ENDARTERECTOMY-CAROTID (Left)    Anesthesia: General    Description of Procedure: critical stenosis with large amount of plaque    Description of the findings of the procedure: see above with Dacron patch    Estimated Blood Loss: * No values recorded between 8/18/2017  3:01 PM and 8/18/2017  4:10 PM *         Specimens:   Specimen (12h ago through future)    None

## 2017-08-18 NOTE — PT/OT/SLP PROGRESS
Physical Therapy  Treatment    Kodi Ribeiro   MRN: [id number]   Admitting Diagnosis: Small bowel obstruction due to adhesions    PT Received On: 08/18/17  PT Start Time: 0940     PT Stop Time: 1005    PT Total Time (min): 25 min       Billable Minutes:  Gait [id number] and Therapeutic Exercise 10    Treatment Type: Treatment  PT/PTA: PT             General Precautions: Standard, fall  Orthopedic Precautions: N/A   Braces:           Subjective:  Communicated with NURSE PEREZ AND EPIC CHART REVIEW  prior to session.   PT AGREED TO TX     Pain/Comfort  Pain Rating 1: 0/10  Pain Rating Post-Intervention 1: 0/10    Objective:   Patient found with: peripheral IV    Functional Mobility:  Bed Mobility:   Rolling/Turning Right: Modified independent  Scooting/Bridging: Modified Independent  Supine to Sit: Modified Independent    Transfers:  Sit <> Stand Assistance: Contact Guard Assistance  Sit <> Stand Assistive Device: Rolling Walker  Bed <> Chair Technique: Stand Pivot  Bed <> Chair Assistance: Minimum Assistance  Bed <> Chair Assistive Device: Rolling Walker    Gait:   Gait Distance: PT GT TRAINED 2X80' WITH RW WITH AFO ON L LE AND PROSTHESIS ON R LE.   Assistance 1: Minimum assistance  Gait Assistive Device: Rolling walker  Gait Pattern: swing-to gait  Gait Deviation(s): decreased maria victoria, decreased weight-shifting ability    Therapeutic Activities and Exercises:  PT RETURNED TO  AFTER GT. PT GT TRAINED WITH CUES FOR SAFETY WITH RW AND POSTURE AWARENESS. PT RETURNED SEATED FOR REST AND COMPLETE 2X15 MIP , TKE AND GLUT SETS. PT LEFT SEATED WITH ALL NEEDS MET.      AM-PAC 6 CLICK MOBILITY  How much help from another person does this patient currently need?   1 = Unable, Total/Dependent Assistance  2 = A lot, Maximum/Moderate Assistance  3 = A little, Minimum/Contact Guard/Supervision  4 = None, Modified [place]/Independent    Turning over in bed (including adjusting bedclothes, sheets and blankets)?: 4  Sitting  down on and standing up from a chair with arms (e.g., wheelchair, bedside commode, etc.): 3  Moving from lying on back to sitting on the side of the bed?: 4  Moving to and from a bed to a chair (including a wheelchair)?: 3  Need to walk in hospital room?: 3  Climbing 3-5 steps with a railing?: 1 (NT)  Total Score: 18    AM-PAC Raw Score CMS G-Code Modifier Level of Impairment Assistance   6 % Total / Unable   7 - 9 CM 80 - 100% Maximal Assist   10 - 14 CL 60 - 80% Moderate Assist   15 - 19 CK 40 - 60% Moderate Assist   20 - 22 CJ 20 - 40% Minimal Assist   23 CI 1-20% SBA / CGA   24 CH 0% Independent/ Mod I     Patient left up in chair with call button in reach.    Assessment:  PT PROGRESSING WITH GT TRAINING AND TE.     Rehab identified problem list/impairments: Rehab identified problem list/impairments: weakness, impaired endurance, gait instability, impaired functional mobilty, impaired balance, decreased lower extremity function, decreased upper extremity function, decreased coordination    Rehab potential is excellent.    Activity tolerance: Good    Discharge recommendations: Discharge Facility/Level Of Care Needs: rehabilitation facility     Barriers to discharge: Barriers to Discharge: None    Equipment recommendations: Equipment Needed After Discharge: walker, rolling     GOALS:    Physical Therapy Goals        Problem: Physical Therapy Goal    Goal Priority Disciplines Outcome Goal Variances Interventions   Physical Therapy Goal     PT/OT, PT      Description:  PT WILL BE SEEN FOR P.T. FOR A MIN OF 5 OUT OF 7 DAYS A WEEK  LT17  1. PT WILL COMPLETE T/F TO CHAIR WITH S.  2. PT WILL GT TRAIN WITH PROTHESIS X 50' WITH MIN A WITH OR WITHOUT RW  3. PT WILL COMPLETE BED MOBILITY IND.                     PLAN:    Patient to be seen    to address the above listed problems via gait training, therapeutic activities, therapeutic exercises  Plan of Care expires: 17  Plan of Care reviewed with:  patient         Lauren Doss, PT  08/18/2017

## 2017-08-18 NOTE — ANESTHESIA PREPROCEDURE EVALUATION
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    Anesthesia Evaluation    I have reviewed the Patient Summary Reports.    I have reviewed the Nursing Notes.   I have reviewed the Medications.     Review of Systems  Hematology/Oncology:         -- Anemia:   Cardiovascular:   Hypertension Past MI CAD   PVD (s/p aorto bifemoral bypass) Significant left carotid artery stenosis 99% with bilateral intracranial ICA stenosis per vascular surgery. Pt is high risk for for MI or stroke per surgeon    ECHO 8/16/17  CONCLUSIONS     1 - Severe left atrial enlargement.     2 - Concentric remodeling.     3 - No wall motion abnormalities.     4 - Normal left ventricular systolic function (EF 60-65%).     5 - Impaired LV relaxation, normal LAP (grade 1 diastolic dysfunction).     6 - Normal right ventricular systolic function .     7 - The estimated PA systolic pressure is greater than 21 mmHg.    Renal/:   Chronic Renal Disease, CRI    Hepatic/GI:   GERD Admitted this admission  with small bowel obstruction and coffee ground emesis   Musculoskeletal:   S/p BKA       Physical Exam  General:  Obesity    Airway/Jaw/Neck:  Airway Findings: Mouth Opening: Normal Tongue: Normal  General Airway Assessment: Adult  Mallampati: III  Jaw/Neck Findings:  Neck ROM: Normal ROM       Chest/Lungs:  Chest/Lungs Findings: Clear to auscultation     Heart/Vascular:  Heart Findings: Rate: Normal  Rhythm: Regular Rhythm             Anesthesia Plan  Type of Anesthesia, risks & benefits discussed:  Anesthesia Type:  general  Patient's Preference:   Intra-op Monitoring Plan: arterial line  Intra-op Monitoring Plan Comments:   Post Op Pain Control Plan: multimodal analgesia  Post Op Pain Control Plan Comments:   Induction:   IV  Beta Blocker:  Patient is on a Beta-Blocker and has received one dose within the past 24 hours (No further documentation required).       Informed Consent: Patient understands risks and agrees with Anesthesia plan.  Questions answered. Anesthesia consent signed with patient.  ASA Score: 4     Day of Surgery Review of History & Physical: 8/17/17.           Ready For Surgery From Anesthesia Perspective.

## 2017-08-18 NOTE — PLAN OF CARE
Problem: Patient Care Overview  Goal: Plan of Care Review  PT REQUIRES MIN A FOR T/F TO CHAIR FROM BED.    Outcome: Ongoing (interventions implemented as appropriate)  PT GT TRAINED 2X80' WITH RW AND STEP TO GT

## 2017-08-18 NOTE — PLAN OF CARE
Problem: Patient Care Overview  Goal: Plan of Care Review  PT REQUIRES MIN A FOR T/F TO CHAIR FROM BED.    Outcome: Ongoing (interventions implemented as appropriate)  POC reviewed with patient. Indications for medications and possible side effects explained. Pt verbalized understanding. Pt reported no pain during the shift. Bowel sounds active in all quads; pt denies nausea or vomiting. VS stable; will continue to monitor.

## 2017-08-19 LAB
ANION GAP SERPL CALC-SCNC: 10 MMOL/L
ANION GAP SERPL CALC-SCNC: 10 MMOL/L
BASOPHILS # BLD AUTO: 0.03 K/UL
BASOPHILS # BLD AUTO: 0.03 K/UL
BASOPHILS NFR BLD: 0.5 %
BASOPHILS NFR BLD: 0.5 %
BUN SERPL-MCNC: 12 MG/DL
BUN SERPL-MCNC: 12 MG/DL
CALCIUM SERPL-MCNC: 8.6 MG/DL
CALCIUM SERPL-MCNC: 8.6 MG/DL
CHLORIDE SERPL-SCNC: 104 MMOL/L
CHLORIDE SERPL-SCNC: 104 MMOL/L
CO2 SERPL-SCNC: 22 MMOL/L
CO2 SERPL-SCNC: 22 MMOL/L
CREAT SERPL-MCNC: 1.2 MG/DL
CREAT SERPL-MCNC: 1.2 MG/DL
DIFFERENTIAL METHOD: ABNORMAL
DIFFERENTIAL METHOD: ABNORMAL
EOSINOPHIL # BLD AUTO: 0.2 K/UL
EOSINOPHIL # BLD AUTO: 0.2 K/UL
EOSINOPHIL NFR BLD: 3.1 %
EOSINOPHIL NFR BLD: 3.1 %
ERYTHROCYTE [DISTWIDTH] IN BLOOD BY AUTOMATED COUNT: 13.6 %
ERYTHROCYTE [DISTWIDTH] IN BLOOD BY AUTOMATED COUNT: 13.6 %
EST. GFR  (AFRICAN AMERICAN): >60 ML/MIN/1.73 M^2
EST. GFR  (AFRICAN AMERICAN): >60 ML/MIN/1.73 M^2
EST. GFR  (NON AFRICAN AMERICAN): >60 ML/MIN/1.73 M^2
EST. GFR  (NON AFRICAN AMERICAN): >60 ML/MIN/1.73 M^2
GLUCOSE SERPL-MCNC: 65 MG/DL
GLUCOSE SERPL-MCNC: 65 MG/DL
HCT VFR BLD AUTO: 32.2 %
HCT VFR BLD AUTO: 32.2 %
HGB BLD-MCNC: 10.4 G/DL
HGB BLD-MCNC: 10.4 G/DL
LYMPHOCYTES # BLD AUTO: 1.2 K/UL
LYMPHOCYTES # BLD AUTO: 1.2 K/UL
LYMPHOCYTES NFR BLD: 19.3 %
LYMPHOCYTES NFR BLD: 19.3 %
MCH RBC QN AUTO: 28.8 PG
MCH RBC QN AUTO: 28.8 PG
MCHC RBC AUTO-ENTMCNC: 32.3 G/DL
MCHC RBC AUTO-ENTMCNC: 32.3 G/DL
MCV RBC AUTO: 89 FL
MCV RBC AUTO: 89 FL
MONOCYTES # BLD AUTO: 0.6 K/UL
MONOCYTES # BLD AUTO: 0.6 K/UL
MONOCYTES NFR BLD: 9.3 %
MONOCYTES NFR BLD: 9.3 %
NEUTROPHILS # BLD AUTO: 4.4 K/UL
NEUTROPHILS # BLD AUTO: 4.4 K/UL
NEUTROPHILS NFR BLD: 67.8 %
NEUTROPHILS NFR BLD: 67.8 %
PLATELET # BLD AUTO: 201 K/UL
PLATELET # BLD AUTO: 201 K/UL
PMV BLD AUTO: 10.1 FL
PMV BLD AUTO: 10.1 FL
POTASSIUM SERPL-SCNC: 3.8 MMOL/L
POTASSIUM SERPL-SCNC: 3.8 MMOL/L
RBC # BLD AUTO: 3.61 M/UL
RBC # BLD AUTO: 3.61 M/UL
SODIUM SERPL-SCNC: 136 MMOL/L
SODIUM SERPL-SCNC: 136 MMOL/L
WBC # BLD AUTO: 6.42 K/UL
WBC # BLD AUTO: 6.42 K/UL

## 2017-08-19 PROCEDURE — 25000003 PHARM REV CODE 250: Performed by: INTERNAL MEDICINE

## 2017-08-19 PROCEDURE — 63600175 PHARM REV CODE 636 W HCPCS: Performed by: SURGERY

## 2017-08-19 PROCEDURE — 21400001 HC TELEMETRY ROOM

## 2017-08-19 PROCEDURE — 63600175 PHARM REV CODE 636 W HCPCS: Performed by: EMERGENCY MEDICINE

## 2017-08-19 PROCEDURE — 80048 BASIC METABOLIC PNL TOTAL CA: CPT

## 2017-08-19 PROCEDURE — 25000003 PHARM REV CODE 250: Performed by: NURSE PRACTITIONER

## 2017-08-19 PROCEDURE — 63600175 PHARM REV CODE 636 W HCPCS: Performed by: INTERNAL MEDICINE

## 2017-08-19 PROCEDURE — 36415 COLL VENOUS BLD VENIPUNCTURE: CPT

## 2017-08-19 PROCEDURE — C9113 INJ PANTOPRAZOLE SODIUM, VIA: HCPCS | Performed by: EMERGENCY MEDICINE

## 2017-08-19 PROCEDURE — 85025 COMPLETE CBC W/AUTO DIFF WBC: CPT

## 2017-08-19 RX ORDER — HYDROMORPHONE HYDROCHLORIDE 1 MG/ML
1 INJECTION, SOLUTION INTRAMUSCULAR; INTRAVENOUS; SUBCUTANEOUS EVERY 4 HOURS PRN
Status: DISCONTINUED | OUTPATIENT
Start: 2017-08-19 | End: 2017-08-21 | Stop reason: HOSPADM

## 2017-08-19 RX ORDER — ACETAMINOPHEN 500 MG
500 TABLET ORAL EVERY 6 HOURS PRN
Status: DISCONTINUED | OUTPATIENT
Start: 2017-08-19 | End: 2017-08-21 | Stop reason: HOSPADM

## 2017-08-19 RX ADMIN — HYDROMORPHONE HYDROCHLORIDE 1 MG: 1 INJECTION, SOLUTION INTRAMUSCULAR; INTRAVENOUS; SUBCUTANEOUS at 03:08

## 2017-08-19 RX ADMIN — AMLODIPINE BESYLATE 10 MG: 10 TABLET ORAL at 08:08

## 2017-08-19 RX ADMIN — HYDROMORPHONE HYDROCHLORIDE 1 MG: 1 INJECTION, SOLUTION INTRAMUSCULAR; INTRAVENOUS; SUBCUTANEOUS at 06:08

## 2017-08-19 RX ADMIN — PANTOPRAZOLE SODIUM 40 MG: 40 INJECTION, POWDER, FOR SOLUTION INTRAVENOUS at 08:08

## 2017-08-19 RX ADMIN — FAMOTIDINE 20 MG: 20 TABLET ORAL at 12:08

## 2017-08-19 RX ADMIN — HYDROMORPHONE HYDROCHLORIDE 1 MG: 1 INJECTION, SOLUTION INTRAMUSCULAR; INTRAVENOUS; SUBCUTANEOUS at 08:08

## 2017-08-19 RX ADMIN — CARVEDILOL 6.25 MG: 6.25 TABLET, FILM COATED ORAL at 05:08

## 2017-08-19 RX ADMIN — LOSARTAN POTASSIUM 100 MG: 50 TABLET, FILM COATED ORAL at 08:08

## 2017-08-19 RX ADMIN — HYDROMORPHONE HYDROCHLORIDE 1 MG: 1 INJECTION, SOLUTION INTRAMUSCULAR; INTRAVENOUS; SUBCUTANEOUS at 12:08

## 2017-08-19 RX ADMIN — CARVEDILOL 6.25 MG: 6.25 TABLET, FILM COATED ORAL at 08:08

## 2017-08-19 RX ADMIN — CEFAZOLIN SODIUM 1 G: 1 SOLUTION INTRAVENOUS at 06:08

## 2017-08-19 RX ADMIN — HYDROMORPHONE HYDROCHLORIDE 1 MG: 1 INJECTION, SOLUTION INTRAMUSCULAR; INTRAVENOUS; SUBCUTANEOUS at 11:08

## 2017-08-19 RX ADMIN — PRAVASTATIN SODIUM 80 MG: 20 TABLET ORAL at 08:08

## 2017-08-19 NOTE — ANESTHESIA POSTPROCEDURE EVALUATION
"Anesthesia Post Evaluation    Patient: Kodi Ribeiro    Procedure(s) Performed: Procedure(s) (LRB):  ENDARTERECTOMY-CAROTID (Left)        Anesthetic complications: no              Visit Vitals  /72 (BP Location: Right arm, Patient Position: Lying)   Pulse (!) 54   Temp 36.5 °C (97.7 °F) (Skin)   Resp 17   Ht 6' 1" (1.854 m)   Wt 90.7 kg (199 lb 15.3 oz)   SpO2 98%   BMI 26.38 kg/m²       Pain/Rommel Score: Pain Assessment Performed: Yes (8/18/2017  6:15 PM)  Presence of Pain: complains of pain/discomfort (8/18/2017  6:15 PM)  Pain Rating Prior to Med Admin: 4 (8/18/2017  6:44 PM)  Pain Rating Post Med Admin: 6 (8/18/2017  5:45 PM)  Rommel Score: 9 (8/18/2017  6:15 PM)      "

## 2017-08-19 NOTE — PROGRESS NOTES
[facility] -   Vascular Surgery  Progress Note    Patient Name: Kodi Ribeiro  MRN: [id number]  Admission Date: 8/11/2017  Primary Care Provider: Norma Nuñez MD    Subjective:     Interval History: pod 1 left CEA with no issues. Neuro improved     Post-Op Info:  Procedure(s) (LRB):  ENDARTERECTOMY-CAROTID (Left)   1 Day Post-Op      Medications:  Continuous Infusions:   sodium chloride 0.9% 50 mL/hr (08/18/17 1747)    lactated Ringers Stopped (08/18/17 1637)     Scheduled Meds:   amlodipine  10 mg Oral Daily    carvedilol  6.25 mg Oral BID WM    losartan  100 mg Oral Daily    pantoprazole  40 mg Intravenous BID    pravastatin  80 mg Oral QHS     PRN Meds:sodium chloride, acetaminophen, famotidine, guaifenesin 100 mg/5 ml, hydrALAZINE, HYDROmorphone, ondansetron, promethazine (PHENERGAN) IVPB     Objective:     Vital Signs (Most Recent):  Temp: 98.4 °F (36.9 °C) (08/19/17 0800)  Pulse: 64 (08/19/17 0800)  Resp: 18 (08/19/17 0800)  BP: (!) 150/85 (08/19/17 0800)  SpO2: 98 % (08/19/17 0800) Vital Signs (24h Range):  Temp:  [97.5 °F (36.4 °C)-98.4 °F (36.9 °C)] 98.4 °F (36.9 °C)  Pulse:  [53-76] 64  Resp:  [11-29] 18  SpO2:  [93 %-100 %] 98 %  BP: (108-150)/(62-85) 150/85  Arterial Line BP: (101-133)/(48-89) 129/51          Physical Exam   Constitutional: He is oriented to person, place, and time. He appears well-developed and well-nourished.   HENT:   Head: Normocephalic.   Incision clean dry intact   Neurological: He is alert and oriented to person, place, and time.   Nursing note and vitals reviewed.      Significant Labs:  All pertinent labs from the last 24 hours have been reviewed.    Significant Diagnostics:  I have reviewed all pertinent imaging results/findings within the past 24 hours.    Assessment/Plan:     Active Diagnoses:    Diagnosis Date Noted POA    PRINCIPAL PROBLEM:  Small bowel obstruction due to adhesions [K56.5] 08/11/2017 Yes    Left carotid artery stenosis [I65.22]  08/16/2017 Unknown    Hypokalemia [E87.6] 08/15/2017 Unknown    Right upper extremity numbness [R20.2] 08/15/2017 Unknown    Cerebral infarction due to embolism of left middle cerebral artery [I63.412] 08/15/2017 No    Coffee ground emesis [K92.0] 08/11/2017 Yes    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 [Z89.511] 07/28/2016 Not Applicable     Chronic    CAD;Old MI ; s/p stents x 3 (2000)  [I25.2] 06/22/2015 Not Applicable     Chronic    CKD (chronic kidney disease) stage 3, GFR 30-59 ml/min [N18.3] 09/04/2014 Yes     Chronic    Essential hypertension [I10] 06/18/2013 Yes     Chronic      Problems Resolved During this Admission:    Diagnosis Date Noted Date Resolved POA    Ulcer of ileum [K63.3]  08/12/2017 Yes     No further vascular issues fu with gina in two weeks  Fareed Hyatt MD  Vascular Surgery  [facility] -

## 2017-08-19 NOTE — PROGRESS NOTES
Pt transferred to unit. Received report from Ashutosh Bautista. Vitals stable. NAD noted. No c/o of pain or SOB. Personal belongings and call bell within reach. Reminded to call for assistance.

## 2017-08-19 NOTE — PLAN OF CARE
Problem: Physical Therapy Goal  Goal: Physical Therapy Goal  PT WILL BE SEEN FOR P.T. FOR A MIN OF 5 OUT OF 7 DAYS A WEEK  LT17  1. PT WILL COMPLETE T/F TO CHAIR WITH S.  2. PT WILL GT TRAIN WITH PROTHESIS X 50' WITH MIN A WITH OR WITHOUT RW  3. PT WILL COMPLETE BED MOBILITY IND.    Outcome: Ongoing (interventions implemented as appropriate)  Sitting in bed this am visiting with family member. States he did not want to do anything today due to pain.

## 2017-08-19 NOTE — PLAN OF CARE
Pt stable. Neuro check intact. Vital signs stable. Pt monitored for 3 hrs pacu as ordered. C/o chest pressure with back pain. Cardiac enzymes sent, 12 lead ekg done and dilaudid and pepcid given. All results reviewed by dr rodriguez and normal. After pepcid pt reported complete resolution of symptoms. orfirmev given for left neck incision pain with good results. Pt escorted to room 223 via stretcher and accompanied by rn with portable monitor. Pt moved self from stretcher to bed without difficulty. Wife at bedside. Report given to elin, floor nurse. Pt transfer completed without incident.

## 2017-08-19 NOTE — PROGRESS NOTES
Dr fuentes notified pt did not have diet orders except npo post op. Order received for advance diet as tolerated to adult regular.  Also informed dr fuentes of pt events earlier re chest pressure. See previous progress noted. No additional orders noted.

## 2017-08-19 NOTE — PROGRESS NOTES
Spoke to MILENA Carey, regarding orders for Morphine q2 hrs scheduled post-op. OK to keep order the way it is now, and chart against if patient is not in pain. Pepcid 20mg BID PRN as needed for itching added. Will continue to monitor.

## 2017-08-19 NOTE — PLAN OF CARE
Problem: Patient Care Overview  Goal: Plan of Care Review  PT REQUIRES MIN A FOR T/F TO CHAIR FROM BED.    Outcome: Ongoing (interventions implemented as appropriate)  Pt AAOX3. Plan of care reviewed with patient, verbalized understanding. Pt remained free from falls, fall precautions in place. Pt NSR/Subhash on monitor, 55-70s. Vital signs stable. Pain well controlled with PRN medication. NS infusing at 50ml/hr. No other c/o at this time. Dressing clean, dry, and intact. Call bell and personal belongings within reach. Reminded to call for assistance.

## 2017-08-19 NOTE — PT/OT/SLP PROGRESS
Physical Therapy      Kodi JADA Gema  MRN: [id number]    Patient not seen today secondary to Pain, Patient unwilling to participate. Will follow-up .    Brennen Connolly, PTA

## 2017-08-20 PROBLEM — E87.6 HYPOKALEMIA: Status: RESOLVED | Noted: 2017-08-15 | Resolved: 2017-08-20

## 2017-08-20 PROBLEM — R20.0 RIGHT UPPER EXTREMITY NUMBNESS: Status: RESOLVED | Noted: 2017-08-15 | Resolved: 2017-08-20

## 2017-08-20 PROBLEM — K56.50 SMALL BOWEL OBSTRUCTION DUE TO ADHESIONS: Status: RESOLVED | Noted: 2017-08-11 | Resolved: 2017-08-20

## 2017-08-20 PROCEDURE — 63600175 PHARM REV CODE 636 W HCPCS: Performed by: EMERGENCY MEDICINE

## 2017-08-20 PROCEDURE — 25000003 PHARM REV CODE 250: Performed by: NURSE PRACTITIONER

## 2017-08-20 PROCEDURE — 63600175 PHARM REV CODE 636 W HCPCS: Performed by: INTERNAL MEDICINE

## 2017-08-20 PROCEDURE — 25000003 PHARM REV CODE 250: Performed by: INTERNAL MEDICINE

## 2017-08-20 PROCEDURE — C9113 INJ PANTOPRAZOLE SODIUM, VIA: HCPCS | Performed by: EMERGENCY MEDICINE

## 2017-08-20 PROCEDURE — 21400001 HC TELEMETRY ROOM

## 2017-08-20 RX ADMIN — HYDRALAZINE HYDROCHLORIDE 10 MG: 20 INJECTION INTRAMUSCULAR; INTRAVENOUS at 11:08

## 2017-08-20 RX ADMIN — AMLODIPINE BESYLATE 10 MG: 10 TABLET ORAL at 09:08

## 2017-08-20 RX ADMIN — HYDROMORPHONE HYDROCHLORIDE 1 MG: 1 INJECTION, SOLUTION INTRAMUSCULAR; INTRAVENOUS; SUBCUTANEOUS at 11:08

## 2017-08-20 RX ADMIN — HYDROMORPHONE HYDROCHLORIDE 1 MG: 1 INJECTION, SOLUTION INTRAMUSCULAR; INTRAVENOUS; SUBCUTANEOUS at 04:08

## 2017-08-20 RX ADMIN — PANTOPRAZOLE SODIUM 40 MG: 40 INJECTION, POWDER, FOR SOLUTION INTRAVENOUS at 09:08

## 2017-08-20 RX ADMIN — CARVEDILOL 6.25 MG: 6.25 TABLET, FILM COATED ORAL at 07:08

## 2017-08-20 RX ADMIN — PRAVASTATIN SODIUM 80 MG: 20 TABLET ORAL at 09:08

## 2017-08-20 RX ADMIN — PANTOPRAZOLE SODIUM 40 MG: 40 INJECTION, POWDER, FOR SOLUTION INTRAVENOUS at 11:08

## 2017-08-20 RX ADMIN — LOSARTAN POTASSIUM 100 MG: 50 TABLET, FILM COATED ORAL at 09:08

## 2017-08-20 RX ADMIN — CARVEDILOL 6.25 MG: 6.25 TABLET, FILM COATED ORAL at 04:08

## 2017-08-20 NOTE — PROGRESS NOTES
"[facility] Hospital Medicine  Progress Note    Patient Name: Kodi Ribeiro  MRN: [id number]  Patient Class: IP- Inpatient   Admission Date: 8/11/2017  Length of Stay: 8 days  Attending Physician: Stefan Leary MD  Primary Care Provider: Norma Nuñez MD        Subjective:     Principal Problem:Small bowel obstruction due to adhesions    HPI:  Mr. Ribeiro is a 68 y/o  male with h/o CAD, PAD, cardiac stents, CKD3, HTN, right BKA, AAA repair complicated by SBO, requiring surgery, left ureteral stent placement, presents to the ED c/o abdominal discomfort, nausea and vomiting since yesterday morning 1 AM. He thought it was "stomach bug" and would pass, but continued to get worse, hence presented to the ED. Last BM 3 days ago (wednesday).    X-Ray abdomen shows "several air-fluid levels within the abdomen.  There are dilated small bowel loops."    CT abdomen reveals "Mid to proximal small bowel loops are dilated, with marked distention of the stomach as well. Contrast noted in the distal esophagus, likely related to reflux. Distal small bowel loops are decompressed, with transition point noted at the lower midline retroperitoneum, at the site of prior surgery".     was contacted who will admit the patient. [facility] was consulted for medical management. Patient just had NG tube placed.    In the ED today, patient had coffee-ground emesis in the ED.  was consulted who recommend supportive care at this time.      Hospital Course:  8/12/17 No acute issues overnight. Pt reports pain is improved. Continue NG decompression. 8/13/17 Pt reports that he had " a really good bowel movement this morning". Pt reports improvement in symptoms. Continue NG compression. No current issues or complaints. ABD xray this morning showed increasing distention compared to prior exam. 8/14/17 The patients NG tube inadvertently came out overnight. Ok per primary team to leave it out. Pts last BM was " yesterday, pt is passing flatus. No current complaints. 8/15/17 The patient began complaining of RUE numbness yesterday. On exam strength was equal bilaterally. Head CT showed a vague 1 cm area of hypodensity in the white matter of the left centrum semiovale. Will obtain an MRI of the brain today and consult Neurology. 8/16/17 Pt unable to have MRI yesterday due to claustrophobia. Neurology recommends doing a CT head w wo and CTA head to view intracranial vessels. Carotid US showed 90-99% stenosis within the left internal carotid artery. Vascular Surgery consulted. 8/17/17 RUE numbness resolving. CTA head showed calcific atherosclerotic plaque of the bilateral cavernous internal carotid arteries with focal moderate to high grade stenoses at the supraclinoid ICAs bilaterally. Neurology following. Vascular following plans on intervention for left internal artery stenosis at some point. Upon discharge the patient will go to [facility]. Pt continuing to have bowel movements. 8/18/17 No acute issues overnight. Plan for a left carotid endarterectomy later today with vascular surgery.     Interval History:  No acute problems or events.  Post op day 1 from left carotid endarterectomy.    Review of Systems   Constitutional: Negative.  Negative for activity change, appetite change, chills, fatigue, fever and unexpected weight change.   HENT: Negative.  Negative for congestion, facial swelling, nosebleeds, rhinorrhea, sinus pressure, sneezing and sore throat.    Eyes: Negative.  Negative for photophobia, discharge, redness and visual disturbance.   Respiratory: Negative.  Negative for apnea, cough, chest tightness, shortness of breath, wheezing and stridor.    Cardiovascular: Negative.  Negative for chest pain, palpitations and leg swelling.   Gastrointestinal: Positive for abdominal pain (discomfort), nausea and vomiting. Negative for abdominal distention, anal bleeding, blood in stool, constipation and diarrhea.    Endocrine: Negative.  Negative for polydipsia, polyphagia and polyuria.   Genitourinary: Negative.  Negative for difficulty urinating, discharge, dysuria, flank pain, frequency, hematuria and urgency.   Musculoskeletal: Negative.  Negative for arthralgias, back pain, gait problem, joint swelling, myalgias, neck pain and neck stiffness.   Skin: Negative.  Negative for color change, pallor and rash.   Allergic/Immunologic: Negative.  Negative for environmental allergies, food allergies and immunocompromised state.   Neurological: Positive for numbness. Negative for dizziness, tremors, seizures, syncope, facial asymmetry, speech difficulty, weakness, light-headedness and headaches.        LUE numbness   Hematological: Negative.    Psychiatric/Behavioral: Negative.  Negative for behavioral problems, confusion, hallucinations and suicidal ideas. The patient is not nervous/anxious.    All other systems reviewed and are negative.    Objective:     Vital Signs (Most Recent):  Temp: 98.6 °F (37 °C) (08/19/17 1901)  Pulse: 76 (08/19/17 1901)  Resp: 18 (08/19/17 1901)  BP: 139/63 (08/19/17 1901)  SpO2: (!) 93 % (08/19/17 1901) Vital Signs (24h Range):  Temp:  [97.1 °F (36.2 °C)-98.6 °F (37 °C)] 98.6 °F (37 °C)  Pulse:  [60-76] 76  Resp:  [16-18] 18  SpO2:  [93 %-98 %] 93 %  BP: (130-166)/(63-85) 139/63     Weight: 90.7 kg (199 lb 15.3 oz)  Body mass index is 26.38 kg/m².    Intake/Output Summary (Last 24 hours) at 08/19/17 2131  Last data filed at 08/19/17 1813   Gross per 24 hour   Intake          2318.33 ml   Output              925 ml   Net          1393.33 ml      Physical Exam   Constitutional: He is oriented to person, place, and time. He appears well-developed and well-nourished. No distress.   Appears uncomfortable.   HENT:   Head: Normocephalic and atraumatic.   Eyes: Conjunctivae and EOM are normal. Pupils are equal, round, and reactive to light. No scleral icterus.   Neck: Normal range of motion. Neck supple. No  thyromegaly present.   Cardiovascular: Normal rate, regular rhythm, normal heart sounds and intact distal pulses.    No murmur heard.  Pulmonary/Chest: Effort normal and breath sounds normal. No respiratory distress. He has no wheezes. He exhibits no tenderness.   Abdominal: Soft. He exhibits no distension. Bowel sounds are decreased. There is no tenderness.   Musculoskeletal: Normal range of motion. He exhibits no edema, tenderness or deformity.   Right BKA, left foot amputation   Lymphadenopathy:     He has no cervical adenopathy.   Neurological: He is alert and oriented to person, place, and time. No cranial nerve deficit. He exhibits normal muscle tone. Coordination normal.   Patient is alert and oriented to person, place and time. Pupils ERRL and EOM normal. No cranial nerve deficit and cranial nerves II-XII are intact. Strength is full bilaterally; it is equal and 5/5 in bilateral upper and lower extremities. There is no pronator drift of outstretched arms. Light touch sense is intact. Speech is clear and normal. DTRs are normal and symmetric; they are 2+ and equal bilaterally. No acute focal neurological deficits noted.      Skin: Skin is warm and dry. No rash noted. He is not diaphoretic. No erythema.   Psychiatric: He has a normal mood and affect. His behavior is normal. Judgment and thought content normal.   Nursing note and vitals reviewed.      Significant Labs: All pertinent labs within the past 24 hours have been reviewed.    Significant Imaging:   Imaging Results          IR Angiogram Carotid Cervical Uni inc Arch (In process)                FL Less Than 1 Hour (In process)                CTA Head (Final result)  Result time 08/16/17 18:43:23    Final result by Will Nelson MD (08/16/17 18:43:23)                 Impression:         1.  No evidence for intracranial aneurysm or AVM.  2.  Calcific atherosclerotic plaque of the bilateral cavernous internal carotid arteries with focal moderate to high  grade stenoses at the supraclinoid ICAs bilaterally.  3.  No emboli or filling defects or thrombus seen in the intracranial arterial circulation.    All CT scans at this facility use dose modulation, iterative reconstruction and/or weight based dosing when appropriate to reduce radiation dose to as low as reasonably achievable.      Electronically signed by: KITTY ALFORD MD  Date:     08/16/17  Time:    18:43              Narrative:    Exam: CT angiogram of the head with and without intravenous contrast    Clinical History:  CVA. Right upper extremity weakness.     Technique: The head was scanned both before and after the intravenous administration of 75 cc of Isovue 370..     Findings:    No hydrocephalus, midline shift, mass effect, or acute intracranial hemorrhage. The visualized paranasal sinuses and mastoid air cells are clear. The skull is intact.    The angiogram shows no intracranial aneurysm or AVM.  There is no abnormal intracranial enhancement with contrast administration.  There is no arterial occlusion or truncation or filling defect.  There are bilateral calcifications of the cavernous ICAs with a focal moderate to high grade stenosis at the supraclinoid right ICA.  There is a focal moderate to high grade stenosis of the supraclinoid left ICA from the calcific plaque.  No focal stenosis is seen in the posterior circulation.  No evidence for vasculitis.  The bilateral internal cerebral veins and dural venous sinuses are patent.                             US Carotid Bilateral (Final result)     Abnormal  Result time 08/16/17 12:08:20    Final result by Aurelio Curtis MD (08/16/17 12:08:20)                 Impression:        90-99% stenosis within the left internal carotid artery. The patient's nurse was notified at 12 PM on 8/16/17.    **Categories of stenosis (0-15%, 16-49%, 50-69% and 70-99% ) are based on published criteria that have been internally validated.  Degree of stenosis is based on NASCET  criteria and refers to the degree of luminal diameter narrowing as a percentage of the normal ICA distal to the stenosis and any area of post stenotic dilation.        Electronically signed by: BHUMI LAU MD  Date:     08/16/17  Time:    12:08              Narrative:    BILATERAL CAROTID DUPLEX ULTRASOUND.    Comparison: None    History:  Stroke    FINDINGS:     Right common carotid:   Peak systolic velocity 155 cm/sec.    Right internal carotid artery: Mild plaque is seen in the bulb    Peak systolic velocity: 123 cm/sec.   IC/CC ratio:  0.8.    Left common carotid:   Peak systolic velocity 67 cm/sec.    Left internal carotid artery: Significant plaque is seen    Peak systolic velocity 582  cm/sec.    IC/CC ratio 8.6.    Both vertebral arteries demonstrate antegrade flow.                             X-Ray Abdomen Flat And Erect (Final result)  Result time 08/15/17 11:37:29    Final result by RADHA Medellin Sr., MD (08/15/17 11:37:29)                 Impression:      1. There are dilated loops of small bowel. In the expected location of the mid to distal portion of the ileum there is a dilated loop of small bowel with a diameter of 4.1 cm. On the prior examination it measured 4.4 cm. This is consistent with the patient's history of a small bowel obstruction.  2. There is a suspected compression fracture of the L1 vertebral body.  3. Surgical changes      Electronically signed by: RADHA MEDELLIN MD  Date:     08/15/17  Time:    11:37              Narrative:    Flat and erect KUB    History: small bowel obstruction; resolving    Finding: Comparison was made to a prior examination performed on 8/13/2017. There are dilated loops of small bowel. In the expected location of the mid to distal portion of the ileum there is a dilated loop of small bowel with a diameter of 4.1 cm. On the prior examination it measured 4.4 cm. There is no pneumoperitoneum. There is a suspected compression fracture of the L1 vertebral body.  There are surgical clips projected over the abdomen and pelvis. There is a left ureteral catheter again visualized. There is partial visualization of a vascular stent in the expected location of the left superficial femoral artery.                             CT Head Without Contrast (Final result)  Result time 08/14/17 18:12:27    Final result by Dwayne Nowak Jr., MD (08/14/17 18:12:27)                 Impression:     Small subtle zone of hypodensity in the left centrum semiovale white matter, with possibilities as above.  No acute hemorrhage is noted.      All CT scans at this facility use dose modulation, iterative reconstruction, and/or weight based dosing when appropriate to reduce radiation dose to as low as reasonably achievable.        Electronically signed by: DWAYNE NOWAK MD  Date:     08/14/17  Time:    18:12              Narrative:    Exam: CT HEAD WITHOUT CONTRAST    History:  Right upper extremity numbness/weakness      Technique: Noncontrast axial images of the head were obtained.  Motion artifact.  A satisfactory exam was acquired.    Comparison:None    Findings: Vague 1 cm area of hypodensity in the white matter of the left centrum semiovale, image 20 series 5.  Possibilities include an area of microvascular ischemic change or subacute lacunar ischemic infarct.  Ventricular system is normal.  No hydrocephalus.  No midline shift.  No abnormal density to indicate acute major vascular distribution ischemic infarction or hemorrhage.  No mass effect.  No extra-axial fluid collections.     Visualized paranasal sinuses and mastoid air cells appear clear.      No calvarial fracture.                             X-Ray Abdomen Flat And Erect (Final result)  Result time 08/13/17 09:56:39    Final result by Freddie Nelson MD (08/13/17 09:56:39)                 Impression:     Small bowel obstruction.  Increasing distention compared to prior exam.      Electronically signed by: FREDDIE NELSON MD  Date:      08/13/17  Time:    09:56              Narrative:    PROCEDURE: XR ABDOMEN FLAT AND ERECT, 08/13/17 08:43:02    HISTORY:  small bowel obstruction    COMPARISON STUDIES: August 11, 2017    FINDINGS:   Diffusely dilated small bowel loops throughout the abdomen with maximum diameter of approximately 4.4 cm.  Scattered surgical clips.  Small amounts of air in the colon with some contrast in the lower left colon.  Nasogastric tube present.  left sided nephroureteral stent.                             X-Ray Chest 1 View (Final result)  Result time 08/11/17 19:56:15    Final result by Stefan Gagnon MD (08/11/17 19:56:15)                 Impression:     Tip of the NG tube not visualized. See above.      Electronically signed by: STEFAN GAGNON MD  Date:     08/11/17  Time:    19:56              Narrative:    Exam: Chest X-ray, one view.    History: Encounter for fitting and adjustment of other gastrointestinal appliance and device    Findings: Comparison is made to prior examination at 1536 hrs. NG tube has been placed, extending into the distal esophagus, tip not visualized. Consider AP view of the abdomen for further assessment.                             CT Abdomen Pelvis  Without Contrast (Final result)  Result time 08/11/17 18:38:49   Procedure changed from CT Abdomen Pelvis With Contrast     Final result by Stefan Gagnon MD (08/11/17 18:38:49)                 Impression:     Mid small bowel obstruction, likely related to postoperative adhesions. See above. No free air or pneumatosis identified.      Electronically signed by: STEFAN GAGNON MD  Date:     08/11/17  Time:    18:38              Narrative:    Examination: CT of the abdomen and pelvis without contrast.    History: Vomiting    Findings: Comparison is made to prior examination dated 03/20/2000 1534 she kidney again noted. Left ureteral stent remains in place. Postoperative changes again noted in the midline retroperitoneum.    Mid to proximal small  bowel loops are dilated, with marked distention of the stomach as well. Contrast noted in the distal esophagus, likely related to reflux. Distal small bowel loops are decompressed, with transition point noted at the lower midline retroperitoneum, at the site of prior surgery. No free air or pneumatosis, and otherwise no significant change from prior exam.                             X-Ray Abdomen Flat And Erect (Final result)  Result time 08/11/17 15:53:16    Final result by Hill Mario MD (08/11/17 15:53:16)                 Impression:      Bowel gas pattern may be due to an ileus.  Small bowel obstruction not excluded.    Other findings as described above.      Electronically signed by: HILL MARIO MD  Date:     08/11/17  Time:    15:53              Narrative:    EXAM:  2 view abdomen, flat and erect    CLINICAL HISTORY: abdominal pain, unspecified without report of trauma    COMPARISON STUDIES: Abdominal series 04/22/2016    FINDINGS:    Abdomen: No definite free air is seen. Several surgical clips within the midabdomen.  Left ureteral stent.  atherosclerosis.    There are several air-fluid levels within the abdomen.  There are dilated small bowel loops..                             X-Ray Chest PA And Lateral (Final result)  Result time 08/11/17 15:51:11    Final result by Hill Mario MD (08/11/17 15:51:11)                 Impression:      No acute cardiopulmonary findings.  Please see above      Electronically signed by: HILL MARIO MD  Date:     08/11/17  Time:    15:51              Narrative:    EXAM: Chest X-ray PA and Lateral    CLINICAL HISTORY:     Vomiting, unspecified    COMPARISON STUDIES:     04/24/2017    FINDINGS:    Surgical clips in the right axilla.  There are surgical staples in the right lung.  Chronic blunting of the right costophrenic sulcus.  Left lung appears clear.  Normal heart size.. Chronic right rib deformities probably from previous thoracotomy.  Coronary artery  stent.  Chronic compression of one of the midthoracic vertebral bodies.  Surgical staples and a ureteral stent is noted on the lateral view..                                 Assessment/Plan:      Left carotid artery stenosis    - Carotid US showed 90-99% stenosis within the left internal carotid artery.   - Vascular Surgery following.   - Left carotid endarterectomy 18 August.        Cerebral infarction due to embolism of left middle cerebral artery    - Neurology following   - Unable to get MRI due to claustrophobia  - CT head with and without not done yesterday  - CTA head calcific atherosclerotic plaque of the bilateral cavernous internal carotid arteries with focal moderate to high grade stenoses at the supraclinoid ICAs bilaterally.  - ECHO showed EF 60% with diastolic dysfunction           Right upper extremity numbness    - CT head showed 1 cm area of hypodensity in the white matter of the left centrum semiovale  - Unable to do MRI brain   - Neurology following   - PT/OT eval  - resume statin/plavix  - CT head with and without   - CTA head  - ECHO             Hypokalemia    - K+ 3.3  - monitor           Coffee ground emesis    - resolved   - Likely due to persistent vomiting.  - Protonix IV daily for now.  - monitor H/H        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    - H/o Right BKA          CAD;Old MI ; s/p stents x 3 (2000)     - Resume statin/plavix  - Denies chest pain or SOB at this time.          CKD (chronic kidney disease) stage 3, GFR 30-59 ml/min    - Serum creatinine at baseline  - Gentle IV hydration  - Monitor, labs in AM          Essential hypertension    - Hydralazine IV prn  - Resume oral meds  - Monitor and adjust as needed          * Small bowel obstruction due to adhesions    - Appears to be resolved   - General Surgery following   - Supportive care  - IV fluids  - Pt reports large BM yesterday, passing flatus  - Advance diet           VTE Risk Mitigation          Ordered     Medium Risk of VTE  Once      08/18/17 2039     Place sequential compression device  Until discontinued      08/18/17 2039     Place sequential compression device  Until discontinued      08/11/17 2210     Reason for No Pharmacological VTE Prophylaxis  Once      08/11/17 2210     Place sequential compression device  Until discontinued      08/11/17 2210              Stefan Leary MD  Department of Hospital Medicine   [facility]

## 2017-08-20 NOTE — PLAN OF CARE
Problem: Patient Care Overview  Goal: Plan of Care Review  Outcome: Ongoing (interventions implemented as appropriate)  POC discussed w/patient, verbalized understanding. NSR on monitor. VSS. Voids per urinal. Frequent weight change encouraged. Dressing CDI. Pt c/o of pain. Relieved with dilaudid. Patient turns independently in bed. Fall precautions in place, bed alarm on. Patient denies needs at this time.

## 2017-08-20 NOTE — PLAN OF CARE
Problem: Patient Care Overview  Goal: Plan of Care Review  Outcome: Ongoing (interventions implemented as appropriate)  Pt stable. Pt is NSR on the heart monitor.  Pt c/o pain to left neck, prn pain med given.  Pt has slight right sided weakness with strength,  equal.  Pt POD #1 following CEA, surgical dressing removed by MD, steri-strips intact.  BP 160s, prn orders for >170s, will continue to monitor.  Pt tolerated RA this shift.    Plan of care reviewed.  Pt verbalizes understanding.  Pt was free from falls or injuries during duration of shift.  Pt turned and repositioned self.  PIV intact with no redness, swelling or drainage.  Bed low, wheels locked, bed alarm on, call light in reach.  Pt instructed to call for assistance.  Will continue to monitor.

## 2017-08-20 NOTE — SUBJECTIVE & OBJECTIVE
Interval History:  No acute problems or events.  Post op day 1 from left carotid endarterectomy.    Review of Systems   Constitutional: Negative.  Negative for activity change, appetite change, chills, fatigue, fever and unexpected weight change.   HENT: Negative.  Negative for congestion, facial swelling, nosebleeds, rhinorrhea, sinus pressure, sneezing and sore throat.    Eyes: Negative.  Negative for photophobia, discharge, redness and visual disturbance.   Respiratory: Negative.  Negative for apnea, cough, chest tightness, shortness of breath, wheezing and stridor.    Cardiovascular: Negative.  Negative for chest pain, palpitations and leg swelling.   Gastrointestinal: Positive for abdominal pain (discomfort), nausea and vomiting. Negative for abdominal distention, anal bleeding, blood in stool, constipation and diarrhea.   Endocrine: Negative.  Negative for polydipsia, polyphagia and polyuria.   Genitourinary: Negative.  Negative for difficulty urinating, discharge, dysuria, flank pain, frequency, hematuria and urgency.   Musculoskeletal: Negative.  Negative for arthralgias, back pain, gait problem, joint swelling, myalgias, neck pain and neck stiffness.   Skin: Negative.  Negative for color change, pallor and rash.   Allergic/Immunologic: Negative.  Negative for environmental allergies, food allergies and immunocompromised state.   Neurological: Positive for numbness. Negative for dizziness, tremors, seizures, syncope, facial asymmetry, speech difficulty, weakness, light-headedness and headaches.        LUE numbness   Hematological: Negative.    Psychiatric/Behavioral: Negative.  Negative for behavioral problems, confusion, hallucinations and suicidal ideas. The patient is not nervous/anxious.    All other systems reviewed and are negative.    Objective:     Vital Signs (Most Recent):  Temp: 98.6 °F (37 °C) (08/19/17 1901)  Pulse: 76 (08/19/17 1901)  Resp: 18 (08/19/17 1901)  BP: 139/63 (08/19/17 1901)  SpO2:  (!) 93 % (08/19/17 1901) Vital Signs (24h Range):  Temp:  [97.1 °F (36.2 °C)-98.6 °F (37 °C)] 98.6 °F (37 °C)  Pulse:  [60-76] 76  Resp:  [16-18] 18  SpO2:  [93 %-98 %] 93 %  BP: (130-166)/(63-85) 139/63     Weight: 90.7 kg (199 lb 15.3 oz)  Body mass index is 26.38 kg/m².    Intake/Output Summary (Last 24 hours) at 08/19/17 2131  Last data filed at 08/19/17 1813   Gross per 24 hour   Intake          2318.33 ml   Output              925 ml   Net          1393.33 ml      Physical Exam   Constitutional: He is oriented to person, place, and time. He appears well-developed and well-nourished. No distress.   Appears uncomfortable.   HENT:   Head: Normocephalic and atraumatic.   Eyes: Conjunctivae and EOM are normal. Pupils are equal, round, and reactive to light. No scleral icterus.   Neck: Normal range of motion. Neck supple. No thyromegaly present.   Cardiovascular: Normal rate, regular rhythm, normal heart sounds and intact distal pulses.    No murmur heard.  Pulmonary/Chest: Effort normal and breath sounds normal. No respiratory distress. He has no wheezes. He exhibits no tenderness.   Abdominal: Soft. He exhibits no distension. Bowel sounds are decreased. There is no tenderness.   Musculoskeletal: Normal range of motion. He exhibits no edema, tenderness or deformity.   Right BKA, left foot amputation   Lymphadenopathy:     He has no cervical adenopathy.   Neurological: He is alert and oriented to person, place, and time. No cranial nerve deficit. He exhibits normal muscle tone. Coordination normal.   Patient is alert and oriented to person, place and time. Pupils ERRL and EOM normal. No cranial nerve deficit and cranial nerves II-XII are intact. Strength is full bilaterally; it is equal and 5/5 in bilateral upper and lower extremities. There is no pronator drift of outstretched arms. Light touch sense is intact. Speech is clear and normal. DTRs are normal and symmetric; they are 2+ and equal bilaterally. No acute  focal neurological deficits noted.      Skin: Skin is warm and dry. No rash noted. He is not diaphoretic. No erythema.   Psychiatric: He has a normal mood and affect. His behavior is normal. Judgment and thought content normal.   Nursing note and vitals reviewed.      Significant Labs: All pertinent labs within the past 24 hours have been reviewed.    Significant Imaging:   Imaging Results          IR Angiogram Carotid Cervical Uni inc Arch (In process)                FL Less Than 1 Hour (In process)                CTA Head (Final result)  Result time 08/16/17 18:43:23    Final result by Will Nelson MD (08/16/17 18:43:23)                 Impression:         1.  No evidence for intracranial aneurysm or AVM.  2.  Calcific atherosclerotic plaque of the bilateral cavernous internal carotid arteries with focal moderate to high grade stenoses at the supraclinoid ICAs bilaterally.  3.  No emboli or filling defects or thrombus seen in the intracranial arterial circulation.    All CT scans at this facility use dose modulation, iterative reconstruction and/or weight based dosing when appropriate to reduce radiation dose to as low as reasonably achievable.      Electronically signed by: WILL NELSON MD  Date:     08/16/17  Time:    18:43              Narrative:    Exam: CT angiogram of the head with and without intravenous contrast    Clinical History:  CVA. Right upper extremity weakness.     Technique: The head was scanned both before and after the intravenous administration of 75 cc of Isovue 370..     Findings:    No hydrocephalus, midline shift, mass effect, or acute intracranial hemorrhage. The visualized paranasal sinuses and mastoid air cells are clear. The skull is intact.    The angiogram shows no intracranial aneurysm or AVM.  There is no abnormal intracranial enhancement with contrast administration.  There is no arterial occlusion or truncation or filling defect.  There are bilateral calcifications of the  cavernous ICAs with a focal moderate to high grade stenosis at the supraclinoid right ICA.  There is a focal moderate to high grade stenosis of the supraclinoid left ICA from the calcific plaque.  No focal stenosis is seen in the posterior circulation.  No evidence for vasculitis.  The bilateral internal cerebral veins and dural venous sinuses are patent.                             US Carotid Bilateral (Final result)     Abnormal  Result time 08/16/17 12:08:20    Final result by Aurelio Lau MD (08/16/17 12:08:20)                 Impression:        90-99% stenosis within the left internal carotid artery. The patient's nurse was notified at 12 PM on 8/16/17.    **Categories of stenosis (0-15%, 16-49%, 50-69% and 70-99% ) are based on published criteria that have been internally validated.  Degree of stenosis is based on NASCET criteria and refers to the degree of luminal diameter narrowing as a percentage of the normal ICA distal to the stenosis and any area of post stenotic dilation.        Electronically signed by: AURELIO LAU MD  Date:     08/16/17  Time:    12:08              Narrative:    BILATERAL CAROTID DUPLEX ULTRASOUND.    Comparison: None    History:  Stroke    FINDINGS:     Right common carotid:   Peak systolic velocity 155 cm/sec.    Right internal carotid artery: Mild plaque is seen in the bulb    Peak systolic velocity: 123 cm/sec.   IC/CC ratio:  0.8.    Left common carotid:   Peak systolic velocity 67 cm/sec.    Left internal carotid artery: Significant plaque is seen    Peak systolic velocity 582  cm/sec.    IC/CC ratio 8.6.    Both vertebral arteries demonstrate antegrade flow.                             X-Ray Abdomen Flat And Erect (Final result)  Result time 08/15/17 11:37:29    Final result by RADHA Medellin Sr., MD (08/15/17 11:37:29)                 Impression:      1. There are dilated loops of small bowel. In the expected location of the mid to distal portion of the ileum there is a  dilated loop of small bowel with a diameter of 4.1 cm. On the prior examination it measured 4.4 cm. This is consistent with the patient's history of a small bowel obstruction.  2. There is a suspected compression fracture of the L1 vertebral body.  3. Surgical changes      Electronically signed by: RADHA RODRIGUEZ MD  Date:     08/15/17  Time:    11:37              Narrative:    Flat and erect KUB    History: small bowel obstruction; resolving    Finding: Comparison was made to a prior examination performed on 8/13/2017. There are dilated loops of small bowel. In the expected location of the mid to distal portion of the ileum there is a dilated loop of small bowel with a diameter of 4.1 cm. On the prior examination it measured 4.4 cm. There is no pneumoperitoneum. There is a suspected compression fracture of the L1 vertebral body. There are surgical clips projected over the abdomen and pelvis. There is a left ureteral catheter again visualized. There is partial visualization of a vascular stent in the expected location of the left superficial femoral artery.                             CT Head Without Contrast (Final result)  Result time 08/14/17 18:12:27    Final result by Dwayne Nowak Jr., MD (08/14/17 18:12:27)                 Impression:     Small subtle zone of hypodensity in the left centrum semiovale white matter, with possibilities as above.  No acute hemorrhage is noted.      All CT scans at this facility use dose modulation, iterative reconstruction, and/or weight based dosing when appropriate to reduce radiation dose to as low as reasonably achievable.        Electronically signed by: DWAYNE NOWAK MD  Date:     08/14/17  Time:    18:12              Narrative:    Exam: CT HEAD WITHOUT CONTRAST    History:  Right upper extremity numbness/weakness      Technique: Noncontrast axial images of the head were obtained.  Motion artifact.  A satisfactory exam was acquired.    Comparison:None    Findings: Vague 1  cm area of hypodensity in the white matter of the left centrum semiovale, image 20 series 5.  Possibilities include an area of microvascular ischemic change or subacute lacunar ischemic infarct.  Ventricular system is normal.  No hydrocephalus.  No midline shift.  No abnormal density to indicate acute major vascular distribution ischemic infarction or hemorrhage.  No mass effect.  No extra-axial fluid collections.     Visualized paranasal sinuses and mastoid air cells appear clear.      No calvarial fracture.                             X-Ray Abdomen Flat And Erect (Final result)  Result time 08/13/17 09:56:39    Final result by Freddie Nelson MD (08/13/17 09:56:39)                 Impression:     Small bowel obstruction.  Increasing distention compared to prior exam.      Electronically signed by: FREDDIE NELSON MD  Date:     08/13/17  Time:    09:56              Narrative:    PROCEDURE: XR ABDOMEN FLAT AND ERECT, 08/13/17 08:43:02    HISTORY:  small bowel obstruction    COMPARISON STUDIES: August 11, 2017    FINDINGS:   Diffusely dilated small bowel loops throughout the abdomen with maximum diameter of approximately 4.4 cm.  Scattered surgical clips.  Small amounts of air in the colon with some contrast in the lower left colon.  Nasogastric tube present.  left sided nephroureteral stent.                             X-Ray Chest 1 View (Final result)  Result time 08/11/17 19:56:15    Final result by Stefan Gagnon MD (08/11/17 19:56:15)                 Impression:     Tip of the NG tube not visualized. See above.      Electronically signed by: STEFAN GAGNON MD  Date:     08/11/17  Time:    19:56              Narrative:    Exam: Chest X-ray, one view.    History: Encounter for fitting and adjustment of other gastrointestinal appliance and device    Findings: Comparison is made to prior examination at 1536 hrs. NG tube has been placed, extending into the distal esophagus, tip not visualized. Consider AP view of the  abdomen for further assessment.                             CT Abdomen Pelvis  Without Contrast (Final result)  Result time 08/11/17 18:38:49   Procedure changed from CT Abdomen Pelvis With Contrast     Final result by Stefan Gagnon MD (08/11/17 18:38:49)                 Impression:     Mid small bowel obstruction, likely related to postoperative adhesions. See above. No free air or pneumatosis identified.      Electronically signed by: STEFAN GAGNON MD  Date:     08/11/17  Time:    18:38              Narrative:    Examination: CT of the abdomen and pelvis without contrast.    History: Vomiting    Findings: Comparison is made to prior examination dated 03/20/2000 1534 she kidney again noted. Left ureteral stent remains in place. Postoperative changes again noted in the midline retroperitoneum.    Mid to proximal small bowel loops are dilated, with marked distention of the stomach as well. Contrast noted in the distal esophagus, likely related to reflux. Distal small bowel loops are decompressed, with transition point noted at the lower midline retroperitoneum, at the site of prior surgery. No free air or pneumatosis, and otherwise no significant change from prior exam.                             X-Ray Abdomen Flat And Erect (Final result)  Result time 08/11/17 15:53:16    Final result by Hill Gastelum MD (08/11/17 15:53:16)                 Impression:      Bowel gas pattern may be due to an ileus.  Small bowel obstruction not excluded.    Other findings as described above.      Electronically signed by: HILL GASTELUM MD  Date:     08/11/17  Time:    15:53              Narrative:    EXAM:  2 view abdomen, flat and erect    CLINICAL HISTORY: abdominal pain, unspecified without report of trauma    COMPARISON STUDIES: Abdominal series 04/22/2016    FINDINGS:    Abdomen: No definite free air is seen. Several surgical clips within the midabdomen.  Left ureteral stent.  atherosclerosis.    There are several  air-fluid levels within the abdomen.  There are dilated small bowel loops..                             X-Ray Chest PA And Lateral (Final result)  Result time 08/11/17 15:51:11    Final result by Hill Mario MD (08/11/17 15:51:11)                 Impression:      No acute cardiopulmonary findings.  Please see above      Electronically signed by: HILL MARIO MD  Date:     08/11/17  Time:    15:51              Narrative:    EXAM: Chest X-ray PA and Lateral    CLINICAL HISTORY:     Vomiting, unspecified    COMPARISON STUDIES:     04/24/2017    FINDINGS:    Surgical clips in the right axilla.  There are surgical staples in the right lung.  Chronic blunting of the right costophrenic sulcus.  Left lung appears clear.  Normal heart size.. Chronic right rib deformities probably from previous thoracotomy.  Coronary artery stent.  Chronic compression of one of the midthoracic vertebral bodies.  Surgical staples and a ureteral stent is noted on the lateral view..

## 2017-08-20 NOTE — PLAN OF CARE
Problem: Patient Care Overview  Goal: Plan of Care Review  Outcome: Ongoing (interventions implemented as appropriate)  Plan of care reviewed with patient, and patient verbalized understanding.  Patient remained free of falls, accidents, and trama during day shift.  Patient has not complained of any pain.  Possible discharge on Monday to the Rehab unit at the [facility].  Continue to monitor.

## 2017-08-20 NOTE — PLAN OF CARE
Met with patient to discuss discharge plans. Dr Leary present. Discussed [employer] denial for inpatient rehab. Offered skilled facility placement. Patient adamantly refuses skilled facility placement. Patient asked if he could go home and resume his home health services, He stated that he is doing better. Patient showed to the physician how he can use his right upper extremity. Preference letter obtained to resume his home health .      @ 2927 Patient's wife asked if she could talk with case management. I met with her . She stated that she has been on the phone with [facility] and they told her they were denying inpatient rehab but that he could go to [facility] for rehab. I explained that the level of service at [facility] would be skilled. She stated that she did not want him to go to [facility] because of its location but that is the only facility that the patient will agree to go. Preference letter obtained to reflect [facility] SNF. FAxed referral to [facility] via [facility]. Will follow up in the am. Dr Leary aware.

## 2017-08-20 NOTE — ASSESSMENT & PLAN NOTE
- Carotid US showed 90-99% stenosis within the left internal carotid artery.   - Vascular Surgery following.   - Left carotid endarterectomy 18 August.

## 2017-08-21 VITALS
BODY MASS INDEX: 26.21 KG/M2 | WEIGHT: 197.75 LBS | HEIGHT: 73 IN | DIASTOLIC BLOOD PRESSURE: 89 MMHG | HEART RATE: 85 BPM | SYSTOLIC BLOOD PRESSURE: 170 MMHG | TEMPERATURE: 99 F | OXYGEN SATURATION: 96 % | RESPIRATION RATE: 18 BRPM

## 2017-08-21 PROCEDURE — 25000003 PHARM REV CODE 250: Performed by: INTERNAL MEDICINE

## 2017-08-21 PROCEDURE — 25000003 PHARM REV CODE 250: Performed by: NURSE PRACTITIONER

## 2017-08-21 PROCEDURE — 63600175 PHARM REV CODE 636 W HCPCS: Performed by: EMERGENCY MEDICINE

## 2017-08-21 PROCEDURE — 94761 N-INVAS EAR/PLS OXIMETRY MLT: CPT

## 2017-08-21 PROCEDURE — C9113 INJ PANTOPRAZOLE SODIUM, VIA: HCPCS | Performed by: EMERGENCY MEDICINE

## 2017-08-21 RX ADMIN — PANTOPRAZOLE SODIUM 40 MG: 40 INJECTION, POWDER, FOR SOLUTION INTRAVENOUS at 09:08

## 2017-08-21 RX ADMIN — LOSARTAN POTASSIUM 100 MG: 50 TABLET, FILM COATED ORAL at 09:08

## 2017-08-21 RX ADMIN — CARVEDILOL 6.25 MG: 6.25 TABLET, FILM COATED ORAL at 07:08

## 2017-08-21 RX ADMIN — AMLODIPINE BESYLATE 10 MG: 10 TABLET ORAL at 09:08

## 2017-08-21 NOTE — PLAN OF CARE
CM provided patients wife with instructions and phone number to call when she arrives with patient at [facility] so that staff come to her car with a wheelchair to transfer patient to unit.  CM also provided patients wife with contact information in case she has any issues when arriving at [facility]

## 2017-08-21 NOTE — PLAN OF CARE
Problem: Patient Care Overview  Goal: Plan of Care Review  Outcome: Ongoing (interventions implemented as appropriate)  POC discussed w/patient, verbalized understanding. NSR on monitor. VSS. Voids per urinal. No foul odor. Pt c/o of pain relieved with dilaudid. IV infiltrated. New IV in place. Frequent weight shift encouraged. Patient turns independently in bed. Fall precautions in place, bed alarm on. Patient denies needs at this time.

## 2017-08-21 NOTE — PT/OT/SLP PROGRESS
Physical Therapy      Kodi Ribeiro  MRN: [id number]    PT CURRENTLY ON HOLD DUE TO SX. 8/18/17, PT WILL REQUIRE NEW P.T. ORDERS FROM MD WHEN APPROPRIATE    Beatris Guerra, PT   8/21/2017  0806

## 2017-08-21 NOTE — PLAN OF CARE
08/21/17 1030   Medicare Message   Important Message from Medicare regarding Discharge Appeal Rights Given to patient/caregiver;Explained to patient/caregiver;Signed/date by patient/caregiver   Date IMM was signed 08/21/17   Time IMM was signed 1030

## 2017-08-21 NOTE — ASSESSMENT & PLAN NOTE
- Neurology following   - Unable to get MRI due to claustrophobia  - CT head with and without not done yesterday  - CTA head calcific atherosclerotic plaque of the bilateral cavernous internal carotid arteries with focal moderate to high grade stenoses at the supraclinoid ICAs bilaterally.  - ECHO showed EF 60% with diastolic dysfunction   Requested placement with rehabilitation facility and awaiting approval.  He was initially denied due to insufficient participation in therapy before [facility].

## 2017-08-21 NOTE — PLAN OF CARE
08/21/17 1214   Final Note   Assessment Type Final Discharge Note   Discharge Disposition SNF   [facility] Care Referral Info   Facility Name [facility]

## 2017-08-21 NOTE — PLAN OF CARE
Patient is to discharge to [facility] SNF this am. Discussed with patient , he stated that his wife will be taking him. Offered [employer] and he again indicated that his wife would drop him off and she would be here around lunch. Discharge orders noted faxed via [facility] to [facility], update to Shiloh Marie RN Admissions Coordinator . Lalit confirmed that authorization has been obtained and patient may arrive after 1pm. Discussed the above with Tonya primary nurse. # for report is [phone]

## 2017-08-21 NOTE — OP NOTE
DATE OF PROCEDURE:  08/18/2017    PREOPERATIVE DIAGNOSES:  Left hemispheric transient ischemic attack and stroke.    POSTOPERATIVE DIAGNOSES:  Left hemispheric transient ischemic attack and stroke.    PROCEDURE PERFORMED:  Left carotid endarterectomy with Dacron patch.    SURGEON:  Stefan Jamil M.D.    ANESTHESIA:  General.    HISTORY OF PRESENT ILLNESS:  This is a 67-year-old male who presented with a   small bowel obstruction was admitted.  He developed some right hand weakness and   numbness, which resolved.  Ultrasound showed a critical left internal carotid   artery stenosis with the small stroke by CT scan.  However, his weakness   resolved before surgery, had a very critical stenosis by ultrasound.    DESCRIPTION OF PROCEDURE:  After satisfactory general anesthesia, left neck was   prepped and draped in sterile fashion.  I made an oblique incision in the left   neck, divided the platysma muscle and dissected out the carotid bifurcation.  I   did ligate the common facial vein and dissected out the distal internal carotid   artery.  This artery was actually somewhat inflamed with some invasive tissue.    I then freed it up.  I gave the patient 7000 units of heparin obtaining positive   control, made arteriotomy in the common carotid artery, extended up the carotid   bifurcation into the internal carotid artery.  There was a large amount of   plaque with a very critical stenosis.  I placed a 3.5 x 5 mm Sundt shunt into   the common internal carotid artery respectively, so flow was restored to the   left cerebral hemisphere.  The plaques were removed circumferentially in the   common carotid artery, origin of external carotid artery and the internal   carotid artery.  Further debris was removed using Chidi forceps and a Kitner   dissector.  I did tack down the distal intima with 7-0 Prolene suture.  We then   closed the arteriotomy site with a Dacron patch with a running 6-0 Prolene   suture.  Just prior to  closure, the Sundt shunt was removed and the arteriotomy   site was closed.  Flow was restored initially to the external carotid artery,   then the internal carotid artery.  I did do a completion angiogram placing a   20-gauge catheter in the common carotid artery, injecting contrast.  This   revealed a satisfactory endarterectomy site and good filling of the carotid   siphon.  The catheter was removed and the hole was closed with Prolene suture.    Reversed the heparin with 75 mg of protamine, irrigated well with Ancef solution   and closed the platysma with running 2-0 Vicryl suture and the skin with 4-0   Vicryl subcuticular suture.  Then used 0.5% Marcaine, injecting some around the   incision site hopefully to decrease his postoperative pain.  A sterile dressing   was applied.  The patient tolerated the procedure well and was sent to the   Recovery Room in satisfactory condition.      DILLON/IN  dd: 08/18/2017 16:20:59 (CDT)  td: 08/18/2017 20:03:31 (CDT)  Doc ID   #[id number]  Job ID #[id number]    CC:

## 2017-08-21 NOTE — PROGRESS NOTES
"[facility] Hospital Medicine  Progress Note    Patient Name: Kodi Ribeiro  MRN: [id number]  Patient Class: IP- Inpatient   Admission Date: 8/11/2017  Length of Stay: 9 days  Attending Physician: Stefan Leary MD  Primary Care Provider: Norma Nuñez MD        Subjective:     Principal Problem:Small bowel obstruction due to adhesions    HPI:  Mr. Ribeiro is a 66 y/o  male with h/o CAD, PAD, cardiac stents, CKD3, HTN, right BKA, AAA repair complicated by SBO, requiring surgery, left ureteral stent placement, presents to the ED c/o abdominal discomfort, nausea and vomiting since yesterday morning 1 AM. He thought it was "stomach bug" and would pass, but continued to get worse, hence presented to the ED. Last BM 3 days ago (wednesday).    X-Ray abdomen shows "several air-fluid levels within the abdomen.  There are dilated small bowel loops."    CT abdomen reveals "Mid to proximal small bowel loops are dilated, with marked distention of the stomach as well. Contrast noted in the distal esophagus, likely related to reflux. Distal small bowel loops are decompressed, with transition point noted at the lower midline retroperitoneum, at the site of prior surgery".     was contacted who will admit the patient. [facility] was consulted for medical management. Patient just had NG tube placed.    In the ED today, patient had coffee-ground emesis in the ED.  was consulted who recommend supportive care at this time.      Hospital Course:  8/12/17 No acute issues overnight. Pt reports pain is improved. Continue NG decompression. 8/13/17 Pt reports that he had " a really good bowel movement this morning". Pt reports improvement in symptoms. Continue NG compression. No current issues or complaints. ABD xray this morning showed increasing distention compared to prior exam. 8/14/17 The patients NG tube inadvertently came out overnight. Ok per primary team to leave it out. Pts last BM was " yesterday, pt is passing flatus. No current complaints. 8/15/17 The patient began complaining of RUE numbness yesterday. On exam strength was equal bilaterally. Head CT showed a vague 1 cm area of hypodensity in the white matter of the left centrum semiovale. Will obtain an MRI of the brain today and consult Neurology. 8/16/17 Pt unable to have MRI yesterday due to claustrophobia. Neurology recommends doing a CT head w wo and CTA head to view intracranial vessels. Carotid US showed 90-99% stenosis within the left internal carotid artery. Vascular Surgery consulted. 8/17/17 RUE numbness resolving. CTA head showed calcific atherosclerotic plaque of the bilateral cavernous internal carotid arteries with focal moderate to high grade stenoses at the supraclinoid ICAs bilaterally. Neurology following. Vascular following plans on intervention for left internal artery stenosis at some point. Upon discharge the patient will go to [facility]. Pt continuing to have bowel movements. 8/18/17 No acute issues overnight.  Left Carotid endarterectomy 18 Aug. Initially denied placement with rehab due to insufficient participation with therapy.  Re-submitting request after CEA.      Interval History:  No acute problems or events.  Post op day 2 from left carotid endarterectomy.    Review of Systems   Constitutional: Negative.  Negative for activity change, appetite change, chills, fatigue, fever and unexpected weight change.   HENT: Negative.  Negative for congestion, facial swelling, nosebleeds, rhinorrhea, sinus pressure, sneezing and sore throat.    Eyes: Negative.  Negative for photophobia, discharge, redness and visual disturbance.   Respiratory: Negative.  Negative for apnea, cough, chest tightness, shortness of breath, wheezing and stridor.    Cardiovascular: Negative.  Negative for chest pain, palpitations and leg swelling.   Gastrointestinal: Positive for abdominal pain (discomfort), nausea and vomiting. Negative for  abdominal distention, anal bleeding, blood in stool, constipation and diarrhea.   Endocrine: Negative.  Negative for polydipsia, polyphagia and polyuria.   Genitourinary: Negative.  Negative for difficulty urinating, discharge, dysuria, flank pain, frequency, hematuria and urgency.   Musculoskeletal: Negative.  Negative for arthralgias, back pain, gait problem, joint swelling, myalgias, neck pain and neck stiffness.   Skin: Negative.  Negative for color change, pallor and rash.   Allergic/Immunologic: Negative.  Negative for environmental allergies, food allergies and immunocompromised state.   Neurological: Positive for numbness. Negative for dizziness, tremors, seizures, syncope, facial asymmetry, speech difficulty, weakness, light-headedness and headaches.        LUE numbness   Hematological: Negative.    Psychiatric/Behavioral: Negative.  Negative for behavioral problems, confusion, hallucinations and suicidal ideas. The patient is not nervous/anxious.    All other systems reviewed and are negative.    Objective:     Vital Signs (Most Recent):  Temp: 98.2 °F (36.8 °C) (08/20/17 1908)  Pulse: 90 (08/20/17 1908)  Resp: 18 (08/20/17 1908)  BP: (!) 169/81 (08/20/17 1908)  SpO2: 95 % (08/20/17 1908) Vital Signs (24h Range):  Temp:  [98.1 °F (36.7 °C)-99.2 °F (37.3 °C)] 98.2 °F (36.8 °C)  Pulse:  [73-90] 90  Resp:  [18] 18  SpO2:  [95 %-100 %] 95 %  BP: (126-185)/(59-87) 169/81     Weight: 90.7 kg (199 lb 15.3 oz)  Body mass index is 26.38 kg/m².    Intake/Output Summary (Last 24 hours) at 08/20/17 1952  Last data filed at 08/20/17 1600   Gross per 24 hour   Intake              630 ml   Output             3050 ml   Net            -2420 ml      Physical Exam   Constitutional: He is oriented to person, place, and time. He appears well-developed and well-nourished. No distress.   Appears uncomfortable.   HENT:   Head: Normocephalic and atraumatic.   Eyes: Conjunctivae and EOM are normal. Pupils are equal, round, and  reactive to light. No scleral icterus.   Neck: Normal range of motion. Neck supple. No thyromegaly present.   Cardiovascular: Normal rate, regular rhythm, normal heart sounds and intact distal pulses.    No murmur heard.  Pulmonary/Chest: Effort normal and breath sounds normal. No respiratory distress. He has no wheezes. He exhibits no tenderness.   Abdominal: Soft. He exhibits no distension. Bowel sounds are decreased. There is no tenderness.   Musculoskeletal: Normal range of motion. He exhibits no edema, tenderness or deformity.   Right BKA, left foot amputation   Lymphadenopathy:     He has no cervical adenopathy.   Neurological: He is alert and oriented to person, place, and time. No cranial nerve deficit. He exhibits normal muscle tone. Coordination normal.   Patient is alert and oriented to person, place and time. Pupils ERRL and EOM normal. No cranial nerve deficit and cranial nerves II-XII are intact. Strength is full bilaterally; it is equal and 5/5 in bilateral upper and lower extremities. There is no pronator drift of outstretched arms. Light touch sense is intact. Speech is clear and normal. DTRs are normal and symmetric; they are 2+ and equal bilaterally. No acute focal neurological deficits noted.      Skin: Skin is warm and dry. No rash noted. He is not diaphoretic. No erythema.   Psychiatric: He has a normal mood and affect. His behavior is normal. Judgment and thought content normal.   Nursing note and vitals reviewed.      Significant Labs: All pertinent labs within the past 24 hours have been reviewed.    Significant Imaging:   Imaging Results          IR Angiogram Carotid Cervical Uni inc Arch (In process)                FL Less Than 1 Hour (In process)                CTA Head (Final result)  Result time 08/16/17 18:43:23    Final result by Will Nelson MD (08/16/17 18:43:23)                 Impression:         1.  No evidence for intracranial aneurysm or AVM.  2.  Calcific atherosclerotic  plaque of the bilateral cavernous internal carotid arteries with focal moderate to high grade stenoses at the supraclinoid ICAs bilaterally.  3.  No emboli or filling defects or thrombus seen in the intracranial arterial circulation.    All CT scans at this facility use dose modulation, iterative reconstruction and/or weight based dosing when appropriate to reduce radiation dose to as low as reasonably achievable.      Electronically signed by: KITTY ALFORD MD  Date:     08/16/17  Time:    18:43              Narrative:    Exam: CT angiogram of the head with and without intravenous contrast    Clinical History:  CVA. Right upper extremity weakness.     Technique: The head was scanned both before and after the intravenous administration of 75 cc of Isovue 370..     Findings:    No hydrocephalus, midline shift, mass effect, or acute intracranial hemorrhage. The visualized paranasal sinuses and mastoid air cells are clear. The skull is intact.    The angiogram shows no intracranial aneurysm or AVM.  There is no abnormal intracranial enhancement with contrast administration.  There is no arterial occlusion or truncation or filling defect.  There are bilateral calcifications of the cavernous ICAs with a focal moderate to high grade stenosis at the supraclinoid right ICA.  There is a focal moderate to high grade stenosis of the supraclinoid left ICA from the calcific plaque.  No focal stenosis is seen in the posterior circulation.  No evidence for vasculitis.  The bilateral internal cerebral veins and dural venous sinuses are patent.                             US Carotid Bilateral (Final result)     Abnormal  Result time 08/16/17 12:08:20    Final result by Aurelio Curtis MD (08/16/17 12:08:20)                 Impression:        90-99% stenosis within the left internal carotid artery. The patient's nurse was notified at 12 PM on 8/16/17.    **Categories of stenosis (0-15%, 16-49%, 50-69% and 70-99% ) are based on  published criteria that have been internally validated.  Degree of stenosis is based on NASCET criteria and refers to the degree of luminal diameter narrowing as a percentage of the normal ICA distal to the stenosis and any area of post stenotic dilation.        Electronically signed by: BHUMI LAU MD  Date:     08/16/17  Time:    12:08              Narrative:    BILATERAL CAROTID DUPLEX ULTRASOUND.    Comparison: None    History:  Stroke    FINDINGS:     Right common carotid:   Peak systolic velocity 155 cm/sec.    Right internal carotid artery: Mild plaque is seen in the bulb    Peak systolic velocity: 123 cm/sec.   IC/CC ratio:  0.8.    Left common carotid:   Peak systolic velocity 67 cm/sec.    Left internal carotid artery: Significant plaque is seen    Peak systolic velocity 582  cm/sec.    IC/CC ratio 8.6.    Both vertebral arteries demonstrate antegrade flow.                             X-Ray Abdomen Flat And Erect (Final result)  Result time 08/15/17 11:37:29    Final result by RADHA Medellin Sr., MD (08/15/17 11:37:29)                 Impression:      1. There are dilated loops of small bowel. In the expected location of the mid to distal portion of the ileum there is a dilated loop of small bowel with a diameter of 4.1 cm. On the prior examination it measured 4.4 cm. This is consistent with the patient's history of a small bowel obstruction.  2. There is a suspected compression fracture of the L1 vertebral body.  3. Surgical changes      Electronically signed by: RADHA MEDELLIN MD  Date:     08/15/17  Time:    11:37              Narrative:    Flat and erect KUB    History: small bowel obstruction; resolving    Finding: Comparison was made to a prior examination performed on 8/13/2017. There are dilated loops of small bowel. In the expected location of the mid to distal portion of the ileum there is a dilated loop of small bowel with a diameter of 4.1 cm. On the prior examination it measured 4.4 cm. There  is no pneumoperitoneum. There is a suspected compression fracture of the L1 vertebral body. There are surgical clips projected over the abdomen and pelvis. There is a left ureteral catheter again visualized. There is partial visualization of a vascular stent in the expected location of the left superficial femoral artery.                             CT Head Without Contrast (Final result)  Result time 08/14/17 18:12:27    Final result by Dwayne Nowak Jr., MD (08/14/17 18:12:27)                 Impression:     Small subtle zone of hypodensity in the left centrum semiovale white matter, with possibilities as above.  No acute hemorrhage is noted.      All CT scans at this facility use dose modulation, iterative reconstruction, and/or weight based dosing when appropriate to reduce radiation dose to as low as reasonably achievable.        Electronically signed by: DWAYNE NOWAK MD  Date:     08/14/17  Time:    18:12              Narrative:    Exam: CT HEAD WITHOUT CONTRAST    History:  Right upper extremity numbness/weakness      Technique: Noncontrast axial images of the head were obtained.  Motion artifact.  A satisfactory exam was acquired.    Comparison:None    Findings: Vague 1 cm area of hypodensity in the white matter of the left centrum semiovale, image 20 series 5.  Possibilities include an area of microvascular ischemic change or subacute lacunar ischemic infarct.  Ventricular system is normal.  No hydrocephalus.  No midline shift.  No abnormal density to indicate acute major vascular distribution ischemic infarction or hemorrhage.  No mass effect.  No extra-axial fluid collections.     Visualized paranasal sinuses and mastoid air cells appear clear.      No calvarial fracture.                             X-Ray Abdomen Flat And Erect (Final result)  Result time 08/13/17 09:56:39    Final result by Lavell Nelson MD (08/13/17 09:56:39)                 Impression:     Small bowel obstruction.  Increasing  distention compared to prior exam.      Electronically signed by: FREDDIE ALFORD MD  Date:     08/13/17  Time:    09:56              Narrative:    PROCEDURE: XR ABDOMEN FLAT AND ERECT, 08/13/17 08:43:02    HISTORY:  small bowel obstruction    COMPARISON STUDIES: August 11, 2017    FINDINGS:   Diffusely dilated small bowel loops throughout the abdomen with maximum diameter of approximately 4.4 cm.  Scattered surgical clips.  Small amounts of air in the colon with some contrast in the lower left colon.  Nasogastric tube present.  left sided nephroureteral stent.                             X-Ray Chest 1 View (Final result)  Result time 08/11/17 19:56:15    Final result by Stefan Gagnon MD (08/11/17 19:56:15)                 Impression:     Tip of the NG tube not visualized. See above.      Electronically signed by: STEFAN GAGNON MD  Date:     08/11/17  Time:    19:56              Narrative:    Exam: Chest X-ray, one view.    History: Encounter for fitting and adjustment of other gastrointestinal appliance and device    Findings: Comparison is made to prior examination at 1536 hrs. NG tube has been placed, extending into the distal esophagus, tip not visualized. Consider AP view of the abdomen for further assessment.                             CT Abdomen Pelvis  Without Contrast (Final result)  Result time 08/11/17 18:38:49   Procedure changed from CT Abdomen Pelvis With Contrast     Final result by Stefan Gagnon MD (08/11/17 18:38:49)                 Impression:     Mid small bowel obstruction, likely related to postoperative adhesions. See above. No free air or pneumatosis identified.      Electronically signed by: STEFAN GAGNON MD  Date:     08/11/17  Time:    18:38              Narrative:    Examination: CT of the abdomen and pelvis without contrast.    History: Vomiting    Findings: Comparison is made to prior examination dated 03/20/2000 1534 she kidney again noted. Left ureteral stent remains in place.  Postoperative changes again noted in the midline retroperitoneum.    Mid to proximal small bowel loops are dilated, with marked distention of the stomach as well. Contrast noted in the distal esophagus, likely related to reflux. Distal small bowel loops are decompressed, with transition point noted at the lower midline retroperitoneum, at the site of prior surgery. No free air or pneumatosis, and otherwise no significant change from prior exam.                             X-Ray Abdomen Flat And Erect (Final result)  Result time 08/11/17 15:53:16    Final result by Hill Mraio MD (08/11/17 15:53:16)                 Impression:      Bowel gas pattern may be due to an ileus.  Small bowel obstruction not excluded.    Other findings as described above.      Electronically signed by: HILL MARIO MD  Date:     08/11/17  Time:    15:53              Narrative:    EXAM:  2 view abdomen, flat and erect    CLINICAL HISTORY: abdominal pain, unspecified without report of trauma    COMPARISON STUDIES: Abdominal series 04/22/2016    FINDINGS:    Abdomen: No definite free air is seen. Several surgical clips within the midabdomen.  Left ureteral stent.  atherosclerosis.    There are several air-fluid levels within the abdomen.  There are dilated small bowel loops..                             X-Ray Chest PA And Lateral (Final result)  Result time 08/11/17 15:51:11    Final result by Hill Mario MD (08/11/17 15:51:11)                 Impression:      No acute cardiopulmonary findings.  Please see above      Electronically signed by: HILL MARIO MD  Date:     08/11/17  Time:    15:51              Narrative:    EXAM: Chest X-ray PA and Lateral    CLINICAL HISTORY:     Vomiting, unspecified    COMPARISON STUDIES:     04/24/2017    FINDINGS:    Surgical clips in the right axilla.  There are surgical staples in the right lung.  Chronic blunting of the right costophrenic sulcus.  Left lung appears clear.  Normal heart  size.. Chronic right rib deformities probably from previous thoracotomy.  Coronary artery stent.  Chronic compression of one of the midthoracic vertebral bodies.  Surgical staples and a ureteral stent is noted on the lateral view..                                 Assessment/Plan:      Left carotid artery stenosis    - Carotid US showed 90-99% stenosis within the left internal carotid artery.   - Vascular Surgery following.   - Left carotid endarterectomy 18 August.        Cerebral infarction due to embolism of left middle cerebral artery    - Neurology following   - Unable to get MRI due to claustrophobia  - CT head with and without not done yesterday  - CTA head calcific atherosclerotic plaque of the bilateral cavernous internal carotid arteries with focal moderate to high grade stenoses at the supraclinoid ICAs bilaterally.  - ECHO showed EF 60% with diastolic dysfunction   Requested placement with rehabilitation facility and awaiting approval.  He was initially denied due to insufficient participation in therapy before CEA.        Coffee ground emesis    - resolved   - Likely due to persistent vomiting.  - Protonix IV daily for now.  - monitor H/H        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    - H/o Right BKA          CAD;Old MI ; s/p stents x 3 (2000)     - Resume statin/plavix  - Denies chest pain or SOB at this time.          CKD (chronic kidney disease) stage 3, GFR 30-59 ml/min    - Serum creatinine at baseline  - Gentle IV hydration  - Monitor, labs in AM          Essential hypertension    - Hydralazine IV prn  - Resume oral meds  - Monitor and adjust as needed            VTE Risk Mitigation         Ordered     Medium Risk of VTE  Once      08/18/17 2039     Place sequential compression device  Until discontinued      08/18/17 2039     Place sequential compression device  Until discontinued      08/11/17 2210     Reason for No Pharmacological VTE Prophylaxis  Once      08/11/17 2210      Place sequential compression device  Until discontinued      08/11/17 [id number]              Stefan Leary MD  Department of Hospital Medicine   [facility]

## 2017-08-21 NOTE — PROGRESS NOTES
Called report to Alysia at [facility], [facility] department.  Removed IV, catheter tip intact.  Telemetry box removed.  Pt dressed and waiting on wife for transportation to [facility].

## 2017-08-21 NOTE — SUBJECTIVE & OBJECTIVE
Interval History:  No acute problems or events.  Post op day 2 from left carotid endarterectomy.    Review of Systems   Constitutional: Negative.  Negative for activity change, appetite change, chills, fatigue, fever and unexpected weight change.   HENT: Negative.  Negative for congestion, facial swelling, nosebleeds, rhinorrhea, sinus pressure, sneezing and sore throat.    Eyes: Negative.  Negative for photophobia, discharge, redness and visual disturbance.   Respiratory: Negative.  Negative for apnea, cough, chest tightness, shortness of breath, wheezing and stridor.    Cardiovascular: Negative.  Negative for chest pain, palpitations and leg swelling.   Gastrointestinal: Positive for abdominal pain (discomfort), nausea and vomiting. Negative for abdominal distention, anal bleeding, blood in stool, constipation and diarrhea.   Endocrine: Negative.  Negative for polydipsia, polyphagia and polyuria.   Genitourinary: Negative.  Negative for difficulty urinating, discharge, dysuria, flank pain, frequency, hematuria and urgency.   Musculoskeletal: Negative.  Negative for arthralgias, back pain, gait problem, joint swelling, myalgias, neck pain and neck stiffness.   Skin: Negative.  Negative for color change, pallor and rash.   Allergic/Immunologic: Negative.  Negative for environmental allergies, food allergies and immunocompromised state.   Neurological: Positive for numbness. Negative for dizziness, tremors, seizures, syncope, facial asymmetry, speech difficulty, weakness, light-headedness and headaches.        LUE numbness   Hematological: Negative.    Psychiatric/Behavioral: Negative.  Negative for behavioral problems, confusion, hallucinations and suicidal ideas. The patient is not nervous/anxious.    All other systems reviewed and are negative.    Objective:     Vital Signs (Most Recent):  Temp: 98.2 °F (36.8 °C) (08/20/17 1908)  Pulse: 90 (08/20/17 1908)  Resp: 18 (08/20/17 1908)  BP: (!) 169/81 (08/20/17  1908)  SpO2: 95 % (08/20/17 1908) Vital Signs (24h Range):  Temp:  [98.1 °F (36.7 °C)-99.2 °F (37.3 °C)] 98.2 °F (36.8 °C)  Pulse:  [73-90] 90  Resp:  [18] 18  SpO2:  [95 %-100 %] 95 %  BP: (126-185)/(59-87) 169/81     Weight: 90.7 kg (199 lb 15.3 oz)  Body mass index is 26.38 kg/m².    Intake/Output Summary (Last 24 hours) at 08/20/17 1952  Last data filed at 08/20/17 1600   Gross per 24 hour   Intake              630 ml   Output             3050 ml   Net            -2420 ml      Physical Exam   Constitutional: He is oriented to person, place, and time. He appears well-developed and well-nourished. No distress.   Appears uncomfortable.   HENT:   Head: Normocephalic and atraumatic.   Eyes: Conjunctivae and EOM are normal. Pupils are equal, round, and reactive to light. No scleral icterus.   Neck: Normal range of motion. Neck supple. No thyromegaly present.   Cardiovascular: Normal rate, regular rhythm, normal heart sounds and intact distal pulses.    No murmur heard.  Pulmonary/Chest: Effort normal and breath sounds normal. No respiratory distress. He has no wheezes. He exhibits no tenderness.   Abdominal: Soft. He exhibits no distension. Bowel sounds are decreased. There is no tenderness.   Musculoskeletal: Normal range of motion. He exhibits no edema, tenderness or deformity.   Right BKA, left foot amputation   Lymphadenopathy:     He has no cervical adenopathy.   Neurological: He is alert and oriented to person, place, and time. No cranial nerve deficit. He exhibits normal muscle tone. Coordination normal.   Patient is alert and oriented to person, place and time. Pupils ERRL and EOM normal. No cranial nerve deficit and cranial nerves II-XII are intact. Strength is full bilaterally; it is equal and 5/5 in bilateral upper and lower extremities. There is no pronator drift of outstretched arms. Light touch sense is intact. Speech is clear and normal. DTRs are normal and symmetric; they are 2+ and equal bilaterally.  No acute focal neurological deficits noted.      Skin: Skin is warm and dry. No rash noted. He is not diaphoretic. No erythema.   Psychiatric: He has a normal mood and affect. His behavior is normal. Judgment and thought content normal.   Nursing note and vitals reviewed.      Significant Labs: All pertinent labs within the past 24 hours have been reviewed.    Significant Imaging:   Imaging Results          IR Angiogram Carotid Cervical Uni inc Arch (In process)                FL Less Than 1 Hour (In process)                CTA Head (Final result)  Result time 08/16/17 18:43:23    Final result by Will Nelson MD (08/16/17 18:43:23)                 Impression:         1.  No evidence for intracranial aneurysm or AVM.  2.  Calcific atherosclerotic plaque of the bilateral cavernous internal carotid arteries with focal moderate to high grade stenoses at the supraclinoid ICAs bilaterally.  3.  No emboli or filling defects or thrombus seen in the intracranial arterial circulation.    All CT scans at this facility use dose modulation, iterative reconstruction and/or weight based dosing when appropriate to reduce radiation dose to as low as reasonably achievable.      Electronically signed by: WILL NELSON MD  Date:     08/16/17  Time:    18:43              Narrative:    Exam: CT angiogram of the head with and without intravenous contrast    Clinical History:  CVA. Right upper extremity weakness.     Technique: The head was scanned both before and after the intravenous administration of 75 cc of Isovue 370..     Findings:    No hydrocephalus, midline shift, mass effect, or acute intracranial hemorrhage. The visualized paranasal sinuses and mastoid air cells are clear. The skull is intact.    The angiogram shows no intracranial aneurysm or AVM.  There is no abnormal intracranial enhancement with contrast administration.  There is no arterial occlusion or truncation or filling defect.  There are bilateral calcifications of  the cavernous ICAs with a focal moderate to high grade stenosis at the supraclinoid right ICA.  There is a focal moderate to high grade stenosis of the supraclinoid left ICA from the calcific plaque.  No focal stenosis is seen in the posterior circulation.  No evidence for vasculitis.  The bilateral internal cerebral veins and dural venous sinuses are patent.                             US Carotid Bilateral (Final result)     Abnormal  Result time 08/16/17 12:08:20    Final result by Aurelio Lau MD (08/16/17 12:08:20)                 Impression:        90-99% stenosis within the left internal carotid artery. The patient's nurse was notified at 12 PM on 8/16/17.    **Categories of stenosis (0-15%, 16-49%, 50-69% and 70-99% ) are based on published criteria that have been internally validated.  Degree of stenosis is based on NASCET criteria and refers to the degree of luminal diameter narrowing as a percentage of the normal ICA distal to the stenosis and any area of post stenotic dilation.        Electronically signed by: AURELIO LAU MD  Date:     08/16/17  Time:    12:08              Narrative:    BILATERAL CAROTID DUPLEX ULTRASOUND.    Comparison: None    History:  Stroke    FINDINGS:     Right common carotid:   Peak systolic velocity 155 cm/sec.    Right internal carotid artery: Mild plaque is seen in the bulb    Peak systolic velocity: 123 cm/sec.   IC/CC ratio:  0.8.    Left common carotid:   Peak systolic velocity 67 cm/sec.    Left internal carotid artery: Significant plaque is seen    Peak systolic velocity 582  cm/sec.    IC/CC ratio 8.6.    Both vertebral arteries demonstrate antegrade flow.                             X-Ray Abdomen Flat And Erect (Final result)  Result time 08/15/17 11:37:29    Final result by RADHA Medellin Sr., MD (08/15/17 11:37:29)                 Impression:      1. There are dilated loops of small bowel. In the expected location of the mid to distal portion of the ileum there  is a dilated loop of small bowel with a diameter of 4.1 cm. On the prior examination it measured 4.4 cm. This is consistent with the patient's history of a small bowel obstruction.  2. There is a suspected compression fracture of the L1 vertebral body.  3. Surgical changes      Electronically signed by: RADHA RODRIGUEZ MD  Date:     08/15/17  Time:    11:37              Narrative:    Flat and erect KUB    History: small bowel obstruction; resolving    Finding: Comparison was made to a prior examination performed on 8/13/2017. There are dilated loops of small bowel. In the expected location of the mid to distal portion of the ileum there is a dilated loop of small bowel with a diameter of 4.1 cm. On the prior examination it measured 4.4 cm. There is no pneumoperitoneum. There is a suspected compression fracture of the L1 vertebral body. There are surgical clips projected over the abdomen and pelvis. There is a left ureteral catheter again visualized. There is partial visualization of a vascular stent in the expected location of the left superficial femoral artery.                             CT Head Without Contrast (Final result)  Result time 08/14/17 18:12:27    Final result by Dwayne Nowak Jr., MD (08/14/17 18:12:27)                 Impression:     Small subtle zone of hypodensity in the left centrum semiovale white matter, with possibilities as above.  No acute hemorrhage is noted.      All CT scans at this facility use dose modulation, iterative reconstruction, and/or weight based dosing when appropriate to reduce radiation dose to as low as reasonably achievable.        Electronically signed by: DWAYNE NOWAK MD  Date:     08/14/17  Time:    18:12              Narrative:    Exam: CT HEAD WITHOUT CONTRAST    History:  Right upper extremity numbness/weakness      Technique: Noncontrast axial images of the head were obtained.  Motion artifact.  A satisfactory exam was acquired.    Comparison:None    Findings:  Vague 1 cm area of hypodensity in the white matter of the left centrum semiovale, image 20 series 5.  Possibilities include an area of microvascular ischemic change or subacute lacunar ischemic infarct.  Ventricular system is normal.  No hydrocephalus.  No midline shift.  No abnormal density to indicate acute major vascular distribution ischemic infarction or hemorrhage.  No mass effect.  No extra-axial fluid collections.     Visualized paranasal sinuses and mastoid air cells appear clear.      No calvarial fracture.                             X-Ray Abdomen Flat And Erect (Final result)  Result time 08/13/17 09:56:39    Final result by Freddie Nelson MD (08/13/17 09:56:39)                 Impression:     Small bowel obstruction.  Increasing distention compared to prior exam.      Electronically signed by: FREDDIE NELSON MD  Date:     08/13/17  Time:    09:56              Narrative:    PROCEDURE: XR ABDOMEN FLAT AND ERECT, 08/13/17 08:43:02    HISTORY:  small bowel obstruction    COMPARISON STUDIES: August 11, 2017    FINDINGS:   Diffusely dilated small bowel loops throughout the abdomen with maximum diameter of approximately 4.4 cm.  Scattered surgical clips.  Small amounts of air in the colon with some contrast in the lower left colon.  Nasogastric tube present.  left sided nephroureteral stent.                             X-Ray Chest 1 View (Final result)  Result time 08/11/17 19:56:15    Final result by Stefan Gagnon MD (08/11/17 19:56:15)                 Impression:     Tip of the NG tube not visualized. See above.      Electronically signed by: STEFAN GAGNON MD  Date:     08/11/17  Time:    19:56              Narrative:    Exam: Chest X-ray, one view.    History: Encounter for fitting and adjustment of other gastrointestinal appliance and device    Findings: Comparison is made to prior examination at 1536 hrs. NG tube has been placed, extending into the distal esophagus, tip not visualized. Consider AP view  of the abdomen for further assessment.                             CT Abdomen Pelvis  Without Contrast (Final result)  Result time 08/11/17 18:38:49   Procedure changed from CT Abdomen Pelvis With Contrast     Final result by Stefan Gagnon MD (08/11/17 18:38:49)                 Impression:     Mid small bowel obstruction, likely related to postoperative adhesions. See above. No free air or pneumatosis identified.      Electronically signed by: STEFAN GAGNON MD  Date:     08/11/17  Time:    18:38              Narrative:    Examination: CT of the abdomen and pelvis without contrast.    History: Vomiting    Findings: Comparison is made to prior examination dated 03/20/2000 1534 she kidney again noted. Left ureteral stent remains in place. Postoperative changes again noted in the midline retroperitoneum.    Mid to proximal small bowel loops are dilated, with marked distention of the stomach as well. Contrast noted in the distal esophagus, likely related to reflux. Distal small bowel loops are decompressed, with transition point noted at the lower midline retroperitoneum, at the site of prior surgery. No free air or pneumatosis, and otherwise no significant change from prior exam.                             X-Ray Abdomen Flat And Erect (Final result)  Result time 08/11/17 15:53:16    Final result by Hill Gastelum MD (08/11/17 15:53:16)                 Impression:      Bowel gas pattern may be due to an ileus.  Small bowel obstruction not excluded.    Other findings as described above.      Electronically signed by: HILL GASTELUM MD  Date:     08/11/17  Time:    15:53              Narrative:    EXAM:  2 view abdomen, flat and erect    CLINICAL HISTORY: abdominal pain, unspecified without report of trauma    COMPARISON STUDIES: Abdominal series 04/22/2016    FINDINGS:    Abdomen: No definite free air is seen. Several surgical clips within the midabdomen.  Left ureteral stent.  atherosclerosis.    There are  several air-fluid levels within the abdomen.  There are dilated small bowel loops..                             X-Ray Chest PA And Lateral (Final result)  Result time 08/11/17 15:51:11    Final result by Hill Mario MD (08/11/17 15:51:11)                 Impression:      No acute cardiopulmonary findings.  Please see above      Electronically signed by: HILL MARIO MD  Date:     08/11/17  Time:    15:51              Narrative:    EXAM: Chest X-ray PA and Lateral    CLINICAL HISTORY:     Vomiting, unspecified    COMPARISON STUDIES:     04/24/2017    FINDINGS:    Surgical clips in the right axilla.  There are surgical staples in the right lung.  Chronic blunting of the right costophrenic sulcus.  Left lung appears clear.  Normal heart size.. Chronic right rib deformities probably from previous thoracotomy.  Coronary artery stent.  Chronic compression of one of the midthoracic vertebral bodies.  Surgical staples and a ureteral stent is noted on the lateral view..

## 2017-08-22 ENCOUNTER — PATIENT OUTREACH (OUTPATIENT)
Dept: ADMINISTRATIVE | Facility: CLINIC | Age: 67
End: 2017-08-22

## 2017-08-22 LAB
BLD PROD TYP BPU: NORMAL
BLOOD UNIT EXPIRATION DATE: NORMAL
BLOOD UNIT TYPE CODE: 5100
BLOOD UNIT TYPE: NORMAL
CODING SYSTEM: NORMAL
DISPENSE STATUS: NORMAL
NUM UNITS TRANS PACKED RBC: NORMAL

## 2017-08-22 NOTE — DISCHARGE SUMMARY
"[facility] Hospital Medicine  Discharge Summary      Patient Name: Kodi Ribeiro  MRN: [id number]  Admission Date: 8/11/2017  Hospital Length of Stay: 10 days  Discharge Date and Time: 8/21/2017  2:18 PM  Attending Physician: Stefan Leary MD   Discharging Provider: Stefan Leary MD  Primary Care Provider: Norma Nuñez MD      HPI:   Mr. Ribeiro is a 68 y/o  male with h/o CAD, PAD, cardiac stents, CKD3, HTN, right BKA, AAA repair complicated by SBO, requiring surgery, left ureteral stent placement, presents to the ED c/o abdominal discomfort, nausea and vomiting since yesterday morning 1 AM. He thought it was "stomach bug" and would pass, but continued to get worse, hence presented to the ED. Last BM 3 days ago (wednesday).    X-Ray abdomen shows "several air-fluid levels within the abdomen.  There are dilated small bowel loops."    CT abdomen reveals "Mid to proximal small bowel loops are dilated, with marked distention of the stomach as well. Contrast noted in the distal esophagus, likely related to reflux. Distal small bowel loops are decompressed, with transition point noted at the lower midline retroperitoneum, at the site of prior surgery".     was contacted who will admit the patient. [facility] was consulted for medical management. Patient just had NG tube placed.    In the ED today, patient had coffee-ground emesis in the ED.  was consulted who recommend supportive care at this time.      Procedure(s) (LRB):  ENDARTERECTOMY-CAROTID (Left)      Indwelling Lines/Drains at time of discharge:   Lines/Drains/Airways          No matching active lines, drains, or airways        Hospital Course:   8/12/17 No acute issues overnight. Pt reports pain is improved. Continue NG decompression. 8/13/17 Pt reports that he had " a really good bowel movement this morning". Pt reports improvement in symptoms. Continue NG compression. No current issues or complaints. ABD xray " this morning showed increasing distention compared to prior exam. 8/14/17 The patients NG tube inadvertently came out overnight. Ok per primary team to leave it out. Pts last BM was yesterday, pt is passing flatus. No current complaints. 8/15/17 The patient began complaining of RUE numbness yesterday. On exam strength was equal bilaterally. Head CT showed a vague 1 cm area of hypodensity in the white matter of the left centrum semiovale. Will obtain an MRI of the brain today and consult Neurology. 8/16/17 Pt unable to have MRI yesterday due to claustrophobia. Neurology recommends doing a CT head w wo and CTA head to view intracranial vessels. Carotid US showed 90-99% stenosis within the left internal carotid artery. Vascular Surgery consulted. 8/17/17 RUE numbness resolving. CTA head showed calcific atherosclerotic plaque of the bilateral cavernous internal carotid arteries with focal moderate to high grade stenoses at the supraclinoid ICAs bilaterally. Neurology following. Vascular following plans on intervention for left internal artery stenosis at some point. Upon discharge the patient will go to [facility]. Pt continuing to have bowel movements. 8/18/17 No acute issues overnight.  Left Carotid endarterectomy 18 Aug. Initially denied placement with rehab due to insufficient participation with therapy.  Re-submitting request after CEA and received approval.  Discharge plan to skilled nursing and follow up in two weeks with vascular surgery.  I have seen and examined Mr. Ribeiro on the day of discharge and deemed him appropriate for discharge.     Consults:   Consults         Status Ordering Provider     Inpatient consult to Gastroenterology  Once     Provider:  (Not yet assigned)    Completed ROYCE GUTIÉRREZ     Inpatient consult to Neurology  Once     Provider:  (Not yet assigned)    Completed DELFINA GARDNER     Inpatient consult to Social Work  Once     Provider:  (Not yet assigned)    Completed  DELFINA GARDNER     Inpatient consult to Vascular Surgery  Once     Provider:  (Not yet assigned)    Completed DELFINA GARDNER          Significant Diagnostic Studies: Labs: CMP No results for input(s): NA, K, CL, CO2, GLU, BUN, CREATININE, CALCIUM, PROT, ALBUMIN, BILITOT, ALKPHOS, AST, ALT, ANIONGAP, ESTGFRAFRICA, EGFRNONAA in the last 48 hours. and CBC No results for input(s): WBC, HGB, HCT, PLT in the last 48 hours.    Pending Diagnostic Studies:     Procedure Component Value Units Date/Time    FL Less Than 1 Hour [[id number]] Resulted:  08/18/17 1636    Order Status:  Sent Lab Status:  In process Updated:  08/18/17 1637        Final Active Diagnoses:    Diagnosis Date Noted POA    Left carotid artery stenosis [I65.22] 08/16/2017 Yes    Cerebral infarction due to embolism of left middle cerebral artery [I63.412] 08/15/2017 No    Coffee ground emesis [K92.0] 08/11/2017 Yes    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 [Z89.511] 07/28/2016 Not Applicable     Chronic    CAD;Old MI ; s/p stents x 3 (2000)  [I25.2] 06/22/2015 Not Applicable     Chronic    CKD (chronic kidney disease) stage 3, GFR 30-59 ml/min [N18.3] 09/04/2014 Yes     Chronic    Essential hypertension [I10] 06/18/2013 Yes     Chronic      Problems Resolved During this Admission:    Diagnosis Date Noted Date Resolved POA    PRINCIPAL PROBLEM:  Small bowel obstruction due to adhesions [K56.5] 08/11/2017 08/20/2017 Yes    Hypokalemia [E87.6] 08/15/2017 08/20/2017 No    Right upper extremity numbness [R20.2] 08/15/2017 08/20/2017 No    Ulcer of ileum [K63.3]  08/12/2017 Yes      No new Assessment & Plan notes have been filed under this hospital service since the last note was generated.  Service: Hospital Medicine      Discharged Condition: good    Disposition: Skilled Nursing Facility    Follow Up:  Follow-up Information     Stefan Jamil MD In 2 weeks.    Specialty:  Vascular Surgery  Why:  Hospital follow up s/p Left  CEA on 18 August.  Contact information:  [address] [facility]  [address]  [facility] [address]  [phone]             Norma Nuñez MD In 1 week.    Specialty:  Family Medicine  Why:  Hospital follow up.  Contact information:  [address] [address] [address]  [phone]             [facility].    Specialty:  SNF Agency  Why:  SNF               Patient Instructions:     Diet general       Medications:  Reconciled Home Medications:   Discharge Medication List as of 8/21/2017  1:25 PM      CONTINUE these medications which have NOT CHANGED    Details   amlodipine (NORVASC) 10 MG tablet TAKE 1 TABLET ONE TIME DAILY, Normal      carvedilol (COREG) 6.25 MG tablet Take 1 tablet (6.25 mg total) by mouth 2 (two) times daily., Starting 4/21/2017, Until Discontinued, Normal      clopidogrel (PLAVIX) 75 mg tablet TAKE 1 TABLET ONE TIME DAILY, Normal      docusate sodium (COLACE) 100 MG capsule Take 1 capsule (100 mg total) by mouth 2 (two) times daily., Starting 5/4/2017, Until Discontinued, Normal      losartan (COZAAR) 100 MG tablet Take 1 tablet (100 mg total) by mouth once daily., Starting 4/21/2017, Until Sat 4/21/18, Normal      omeprazole (PRILOSEC) 20 MG capsule Take 1 capsule (20 mg total) by mouth 2 (two) times daily., Starting 2/23/2017, Until Discontinued, Normal      oxycodone-acetaminophen (PERCOCET) 5-325 mg per tablet Take 1-2 tablets by mouth every 4 (four) hours as needed for Pain., Starting 5/4/2017, Until Discontinued, Normal      pravastatin (PRAVACHOL) 80 MG tablet Take 1 tablet (80 mg total) by mouth every evening., Starting 2/23/2017, Until Discontinued, Normal      triamcinolone acetonide 0.025% (KENALOG) 0.025 % cream Apply topically 2 (two) times daily., Starting Tue 6/20/2017, Until Thu 7/27/2017, Normal           Time spent on the discharge of patient: 30 minutes    HOS POC IP DISCHARGE SUMMARY    Stefan Leary MD  Department of Hospital  Medicine  [facility]

## 2017-08-31 ENCOUNTER — OFFICE VISIT (OUTPATIENT)
Dept: FAMILY MEDICINE | Facility: CLINIC | Age: 67
End: 2017-08-31
Payer: MEDICARE

## 2017-08-31 VITALS
TEMPERATURE: 98 F | DIASTOLIC BLOOD PRESSURE: 70 MMHG | WEIGHT: 197.75 LBS | SYSTOLIC BLOOD PRESSURE: 111 MMHG | BODY MASS INDEX: 26.21 KG/M2 | OXYGEN SATURATION: 97 % | HEIGHT: 73 IN | HEART RATE: 78 BPM

## 2017-08-31 DIAGNOSIS — I69.339: ICD-10-CM

## 2017-08-31 DIAGNOSIS — Z98.890 S/P CAROTID ENDARTERECTOMY: Primary | ICD-10-CM

## 2017-08-31 DIAGNOSIS — Z89.511 STATUS POST BELOW KNEE AMPUTATION OF RIGHT LOWER EXTREMITY: Chronic | ICD-10-CM

## 2017-08-31 DIAGNOSIS — I10 ESSENTIAL HYPERTENSION: Chronic | ICD-10-CM

## 2017-08-31 PROCEDURE — 1159F MED LIST DOCD IN RCRD: CPT | Mod: S$GLB,,, | Performed by: FAMILY MEDICINE

## 2017-08-31 PROCEDURE — 3008F BODY MASS INDEX DOCD: CPT | Mod: S$GLB,,, | Performed by: FAMILY MEDICINE

## 2017-08-31 PROCEDURE — 99499 UNLISTED E&M SERVICE: CPT | Mod: S$GLB,,, | Performed by: FAMILY MEDICINE

## 2017-08-31 PROCEDURE — 99214 OFFICE O/P EST MOD 30 MIN: CPT | Mod: S$GLB,,, | Performed by: FAMILY MEDICINE

## 2017-08-31 PROCEDURE — 3078F DIAST BP <80 MM HG: CPT | Mod: S$GLB,,, | Performed by: FAMILY MEDICINE

## 2017-08-31 PROCEDURE — 1126F AMNT PAIN NOTED NONE PRSNT: CPT | Mod: S$GLB,,, | Performed by: FAMILY MEDICINE

## 2017-08-31 PROCEDURE — 3074F SYST BP LT 130 MM HG: CPT | Mod: S$GLB,,, | Performed by: FAMILY MEDICINE

## 2017-08-31 PROCEDURE — 99999 PR PBB SHADOW E&M-EST. PATIENT-LVL III: CPT | Mod: PBBFAC,,, | Performed by: FAMILY MEDICINE

## 2017-08-31 NOTE — PROGRESS NOTES
Chief Complaint:    Chief Complaint   Patient presents with    Follow-up     post op       History of Present Illness:  Patient is  hospital discharge follow-up  He was hospitalized with small bowel obstruction pending surgery but that resolved on its own.  He was found to have CVA involving the right side weakness in the right arm, carotid Dopplers revealed that he has severe stenosis about 99% on the left side.  He underwent carotid endarterectomy uneventfully.  The CVA symptoms improved he still got slight weakness of the hand  but otherwise doing fine no trouble swallowing he spent some time and rehabilitation.  Currently he is on Plavix and statin.  He has a follow-up coming up with Dr. Rod.      ROS:  Review of Systems   Constitutional: Negative for activity change, chills, fatigue, fever and unexpected weight change.   HENT: Negative for congestion, ear discharge, ear pain, hearing loss, postnasal drip and rhinorrhea.    Eyes: Negative for pain and visual disturbance.   Respiratory: Negative for cough, chest tightness and shortness of breath.    Cardiovascular: Negative for chest pain and palpitations.   Gastrointestinal: Negative for abdominal pain, diarrhea and vomiting.   Endocrine: Negative for heat intolerance.   Genitourinary: Negative for dysuria, flank pain, frequency and hematuria.   Musculoskeletal: Negative for back pain, gait problem and neck pain.   Skin: Negative for color change and rash.   Neurological: Negative for dizziness, tremors, seizures, numbness and headaches.   Psychiatric/Behavioral: Negative for agitation, hallucinations, self-injury, sleep disturbance and suicidal ideas. The patient is not nervous/anxious.        Past Medical History:   Diagnosis Date    Anemia     Arthritis     Colon polyp     Repeat colonoscopy due in 9/14    Coronary artery disease     Diverticulosis     colonoscopy 2/21/2014    GERD (gastroesophageal reflux disease)     Hemorrhoids      colonoscopy 2/21/2014    Horseshoe kidney     Hyperglycemia 3/17/2014    Hyperlipidemia     Hypertension     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    Late complications of amputation stump     rseolved with further amputation( MRSA then none since 2014)    Lipoma of colon     colonoscopy 2/21/2014    Myocardial infarction     per patient 2000 & 9/2012    Peripheral vascular disease     Phantom limb syndrome     patient reports only intermittent not problematic, not worsening    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    Spinal cord disease     L4L5 disc    Stroke     Tobacco dependence     resolved    Ureteral stent retained        Social History:  Social History     Social History    Marital status:      Spouse name: Laury    Number of children: 2    Years of education: N/A     Occupational History    Retired       [employer]     Social History Main Topics    Smoking status: Former Smoker     Packs/day: 1.00     Years: 15.00     Quit date: 1/1/2009    Smokeless tobacco: Never Used    Alcohol use No    Drug use: No      Comment: Is on prescription opiod, no non prescribed use    Sexual activity: Not Currently     Partners: Female     Other Topics Concern    None     Social History Narrative     . 4 children alive and well. 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  Still drives. Does not have a Living Will.       Family History:   family history includes COPD in his mother; Cancer in his mother; Diabetes in his daughter; Heart disease in his father.    Health Maintenance   Topic Date Due    Influenza Vaccine  08/01/2017    Lipid Panel  08/15/2018    Colonoscopy  02/21/2019    TETANUS VACCINE  12/04/2024    Hepatitis C Screening  Completed    Zoster Vaccine  Completed    Pneumococcal (65+)  Completed    Abdominal Aortic Aneurysm Screening  Addressed       Physical Exam:    Vital Signs  Temp: 98.4 °F (36.9  "°C)  Temp src: Tympanic  Pulse: 78  SpO2: 97 %  BP: 111/70  BP Location: Left arm  Patient Position: Sitting  Pain Score: 0-No pain  Height and Weight  Height: 6' 1" (185.4 cm)  Weight: 89.7 kg (197 lb 12 oz)  BSA (Calculated - sq m): 2.15 sq meters  BMI (Calculated): 26.1  Weight in (lb) to have BMI = 25: 189.1]    Body mass index is 26.09 kg/m².    Physical Exam   Constitutional: He is oriented to person, place, and time. He appears well-developed.   HENT:   Mouth/Throat: Oropharynx is clear and moist.   Eyes: Conjunctivae are normal. Pupils are equal, round, and reactive to light.   Neck: Normal range of motion. Neck supple.   Cardiovascular: Normal rate, regular rhythm and normal heart sounds.    No murmur heard.  Pulmonary/Chest: Effort normal and breath sounds normal. No respiratory distress. He has no wheezes. He has no rales. He exhibits no tenderness.   Abdominal: Soft. He exhibits no distension and no mass. There is no tenderness. There is no guarding.   Musculoskeletal: He exhibits no edema or tenderness.   Lymphadenopathy:     He has no cervical adenopathy.   Neurological: He is alert and oriented to person, place, and time. He has normal reflexes.   He has some weakness involving  of the right hand otherwise intact.   Skin: Skin is warm and dry.   Psychiatric: He has a normal mood and affect. His behavior is normal. Judgment and thought content normal.       Lab Results   Component Value Date    CHOL 122 08/15/2017    CHOL 141 12/29/2016    CHOL 168 12/04/2014    TRIG 126 08/15/2017    TRIG 214 (H) 12/29/2016    TRIG 149 12/04/2014    HDL 34 (L) 08/15/2017    HDL 29 (L) 12/29/2016    HDL 33 (L) 12/04/2014    TOTALCHOLEST 3.6 08/15/2017    TOTALCHOLEST 4.9 12/29/2016    TOTALCHOLEST 5.1 (H) 12/04/2014    NONHDLCHOL 88 08/15/2017    NONHDLCHOL 112 12/29/2016    NONHDLCHOL 135 12/04/2014       Lab Results   Component Value Date    HGBA1C 5.6 06/23/2016       Assessment:      ICD-10-CM ICD-9-CM   1. S/P " carotid endarterectomy Z98.890 V45.89   2. Essential hypertension I10 401.9   3. 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 Z89.511 V49.75   4. Monoplegia, upper limb, dominant side S/P CVA (cerebrovascular acc) I69.339 438.31         Plan:  Patient with above-mentioned medical problem list daily has recovered very well following his CVA with only slight hand weakness is finished with therapy.  I have emphasized that he needs to keep appointment with the vascular surgeon since he has some more stenosis involving the internal carotid arteries bilaterally that will need to be addressed.  He is to continue Plavix.  Small bowel obstruction is resolved.  Important to keep the blood pressure and lipids in check.  Follow-up as scheduled.  No orders of the defined types were placed in this encounter.      Current Outpatient Prescriptions   Medication Sig Dispense Refill    amlodipine (NORVASC) 10 MG tablet TAKE 1 TABLET ONE TIME DAILY 90 tablet 3    carvedilol (COREG) 6.25 MG tablet Take 1 tablet (6.25 mg total) by mouth 2 (two) times daily. 180 tablet 3    clopidogrel (PLAVIX) 75 mg tablet TAKE 1 TABLET ONE TIME DAILY 90 tablet 3    docusate sodium (COLACE) 100 MG capsule Take 1 capsule (100 mg total) by mouth 2 (two) times daily. 60 capsule 0    losartan (COZAAR) 100 MG tablet Take 1 tablet (100 mg total) by mouth once daily. 90 tablet 3    omeprazole (PRILOSEC) 20 MG capsule Take 1 capsule (20 mg total) by mouth 2 (two) times daily. 90 capsule 3    oxycodone-acetaminophen (PERCOCET) 5-325 mg per tablet Take 1-2 tablets by mouth every 4 (four) hours as needed for Pain. 30 tablet 0    pravastatin (PRAVACHOL) 80 MG tablet Take 1 tablet (80 mg total) by mouth every evening. 90 tablet 3    triamcinolone acetonide 0.025% (KENALOG) 0.025 % cream Apply topically 2 (two) times daily. 15 g 0     No current facility-administered medications for this visit.        There are no discontinued medications.    No  Follow-up on file.      Dr Norma Nuñez MD    Disclaimer: This note is prepared using voice recognition system and as such is likely to have errors and is not proof read.

## 2017-09-12 ENCOUNTER — TELEPHONE (OUTPATIENT)
Dept: FAMILY MEDICINE | Facility: CLINIC | Age: 67
End: 2017-09-12

## 2017-09-12 NOTE — TELEPHONE ENCOUNTER
----- Message from Yoselin Thornton sent at 9/12/2017 12:09 PM CDT -----  Contact: pt  The pt states he is having shoulder pain and wants to be worked in tomorrow, pt can be reached at  [phone]///[email or web address]

## 2017-09-12 NOTE — TELEPHONE ENCOUNTER
Attempted to reach pt and he was not available , left a message with the female that answered the phone that he has an appt tomorrow with dr Nuñez at 10:00

## 2017-09-13 ENCOUNTER — OFFICE VISIT (OUTPATIENT)
Dept: FAMILY MEDICINE | Facility: CLINIC | Age: 67
End: 2017-09-13
Payer: MEDICARE

## 2017-09-13 VITALS
HEART RATE: 84 BPM | OXYGEN SATURATION: 99 % | SYSTOLIC BLOOD PRESSURE: 124 MMHG | DIASTOLIC BLOOD PRESSURE: 68 MMHG | HEIGHT: 73 IN | BODY MASS INDEX: 25.93 KG/M2 | TEMPERATURE: 98 F | WEIGHT: 195.69 LBS

## 2017-09-13 DIAGNOSIS — S43.422A SPRAIN OF LEFT ROTATOR CUFF CAPSULE, INITIAL ENCOUNTER: Primary | ICD-10-CM

## 2017-09-13 PROCEDURE — 3078F DIAST BP <80 MM HG: CPT | Mod: S$GLB,,, | Performed by: FAMILY MEDICINE

## 2017-09-13 PROCEDURE — 1159F MED LIST DOCD IN RCRD: CPT | Mod: S$GLB,,, | Performed by: FAMILY MEDICINE

## 2017-09-13 PROCEDURE — 99999 PR PBB SHADOW E&M-EST. PATIENT-LVL III: CPT | Mod: PBBFAC,,, | Performed by: FAMILY MEDICINE

## 2017-09-13 PROCEDURE — 3008F BODY MASS INDEX DOCD: CPT | Mod: S$GLB,,, | Performed by: FAMILY MEDICINE

## 2017-09-13 PROCEDURE — 99213 OFFICE O/P EST LOW 20 MIN: CPT | Mod: 25,S$GLB,, | Performed by: FAMILY MEDICINE

## 2017-09-13 PROCEDURE — 3074F SYST BP LT 130 MM HG: CPT | Mod: S$GLB,,, | Performed by: FAMILY MEDICINE

## 2017-09-13 PROCEDURE — 1126F AMNT PAIN NOTED NONE PRSNT: CPT | Mod: S$GLB,,, | Performed by: FAMILY MEDICINE

## 2017-09-13 PROCEDURE — 20605 DRAIN/INJ JOINT/BURSA W/O US: CPT | Mod: S$GLB,,, | Performed by: FAMILY MEDICINE

## 2017-09-13 RX ORDER — METHYLPREDNISOLONE ACETATE 40 MG/ML
40 INJECTION, SUSPENSION INTRA-ARTICULAR; INTRALESIONAL; INTRAMUSCULAR; SOFT TISSUE
Status: COMPLETED | OUTPATIENT
Start: 2017-09-13 | End: 2017-09-13

## 2017-09-13 RX ADMIN — METHYLPREDNISOLONE ACETATE 40 MG: 40 INJECTION, SUSPENSION INTRA-ARTICULAR; INTRALESIONAL; INTRAMUSCULAR; SOFT TISSUE at 04:09

## 2017-09-13 NOTE — PROGRESS NOTES
Chief Complaint:    Chief Complaint   Patient presents with    Shoulder Pain       History of Present Illness:  He says he did something to her shoulder and that caused to start hurting since yesterday hurts to move beyond his head a similar problem in the right shoulder and he got a cortisone injection he would like to get that today.      ROS:  Review of Systems    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    Late complications of amputation stump     rseolved with further amputation( MRSA then none since 2014)    Lipoma of colon     colonoscopy 2/21/2014    Myocardial infarction     per patient 2000 & 9/2012    Peripheral vascular disease     Phantom limb syndrome     patient reports only intermittent not problematic, not worsening    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    Spinal cord disease     L4L5 disc    Stroke     Tobacco dependence     resolved    Ureteral stent retained        Social History:  Social History     Social History    Marital status:      Spouse name: Laury    Number of children: 2    Years of education: N/A     Occupational History    Retired       [employer]     Social History Main Topics    Smoking status: Former Smoker     Packs/day: 1.00     Years: 15.00     Quit date: 1/1/2009    Smokeless tobacco: Never Used    Alcohol use No    Drug use: No      Comment: Is on prescription opiod, no non prescribed use    Sexual activity: Not Currently     Partners: Female     Other Topics Concern    None     Social History Narrative     . 4 children alive and well. 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  Still drives. Does not have a Living Will.       Family History:   family history includes COPD in his mother; Cancer in his mother; Diabetes in his daughter; Heart disease in his father.    Health Maintenance   Topic Date Due    Influenza Vaccine  08/01/2017    Lipid Panel  08/15/2018    Colonoscopy  02/21/2019    TETANUS VACCINE  12/04/2024    Hepatitis C Screening  Completed    Zoster Vaccine  Completed    Pneumococcal (65+)  Completed    Abdominal Aortic Aneurysm Screening  Addressed       Physical Exam:    Vital Signs  Temp: 97.6 °F (36.4 °C)  Temp src: Tympanic  Pulse: 84  SpO2: 99 %  BP: 124/68  BP Location: Left arm  Patient Position: Sitting  Pain Score: 0-No pain  Height and Weight  Height: 6' 1" (185.4 cm)  Weight: 88.7 kg (195 lb 10.5 oz)  BSA (Calculated - sq m): 2.14 sq meters  BMI (Calculated): 25.9  Weight in (lb) to have BMI = 25: 189.1]    Body mass index is 25.81 kg/m².    Physical Exam    Lab Results   Component Value Date    CHOL 122 08/15/2017    CHOL 141 12/29/2016    CHOL 168 12/04/2014    TRIG 126 08/15/2017    TRIG 214 (H) 12/29/2016    TRIG 149 12/04/2014    HDL 34 (L) 08/15/2017    HDL 29 (L) 12/29/2016    HDL 33 (L) 12/04/2014    TOTALCHOLEST 3.6 08/15/2017    TOTALCHOLEST 4.9 12/29/2016    TOTALCHOLEST 5.1 (H) 12/04/2014    NONHDLCHOL 88 08/15/2017    NONHDLCHOL 112 12/29/2016    NONHDLCHOL 135 12/04/2014       Lab Results   Component Value Date    HGBA1C 5.6 06/23/2016       Assessment:      ICD-10-CM ICD-9-CM   1. Sprain of left rotator cuff capsule, initial encounter S43.422A 840.4         Plan:  Injection done in the subacromial tendon and the posterior lateral rotator cuff.  Please refer the response a partial improvement recommend physical therapy.  Offered steroid injection, risk of steroid injection discussed with the patient which include but not limited to,  1.  Infection  2.  Bleeding  3.  Tendon " disruption  4.  Elevation of blood sugar  5.  Fat atrophy  6.  Worsening pain and other unforeseen complication.  Treatment alternatives were also discussed, patient likes to proceed with the steroid injection.  Depo-Medrol 40 mg and lidocaine 2% without epi Cc was used for injection, and the area was identified prepped and injection done sterile condition, patient tolerated procedure well.    No orders of the defined types were placed in this encounter.      Current Outpatient Prescriptions   Medication Sig Dispense Refill    amlodipine (NORVASC) 10 MG tablet TAKE 1 TABLET ONE TIME DAILY 90 tablet 3    carvedilol (COREG) 6.25 MG tablet Take 1 tablet (6.25 mg total) by mouth 2 (two) times daily. 180 tablet 3    clopidogrel (PLAVIX) 75 mg tablet TAKE 1 TABLET ONE TIME DAILY 90 tablet 3    docusate sodium (COLACE) 100 MG capsule Take 1 capsule (100 mg total) by mouth 2 (two) times daily. 60 capsule 0    losartan (COZAAR) 100 MG tablet Take 1 tablet (100 mg total) by mouth once daily. 90 tablet 3    omeprazole (PRILOSEC) 20 MG capsule Take 1 capsule (20 mg total) by mouth 2 (two) times daily. 90 capsule 3    oxycodone-acetaminophen (PERCOCET) 5-325 mg per tablet Take 1-2 tablets by mouth every 4 (four) hours as needed for Pain. 30 tablet 0    pravastatin (PRAVACHOL) 80 MG tablet Take 1 tablet (80 mg total) by mouth every evening. 90 tablet 3    triamcinolone acetonide 0.025% (KENALOG) 0.025 % cream Apply topically 2 (two) times daily. 15 g 0     No current facility-administered medications for this visit.        There are no discontinued medications.    No Follow-up on file.      Dr Norma Nuñez MD    Disclaimer: This note is prepared using voice recognition system and as such is likely to have errors and is not proof read.

## 2017-10-04 RX ORDER — OMEPRAZOLE 20 MG/1
20 CAPSULE, DELAYED RELEASE ORAL 2 TIMES DAILY
Qty: 90 CAPSULE | Refills: 3 | Status: SHIPPED | OUTPATIENT
Start: 2017-10-04 | End: 2018-02-18 | Stop reason: SDUPTHER

## 2017-10-04 NOTE — TELEPHONE ENCOUNTER
----- Message from Brittnee Amaral sent at 10/4/2017  1:37 PM CDT -----  He needs a refill of his omeprazole 20mg sent to [facility] for 90 day supply.

## 2017-10-23 ENCOUNTER — LAB VISIT (OUTPATIENT)
Dept: LAB | Facility: HOSPITAL | Age: 67
End: 2017-10-23
Attending: FAMILY MEDICINE
Payer: MEDICARE

## 2017-10-23 ENCOUNTER — OFFICE VISIT (OUTPATIENT)
Dept: FAMILY MEDICINE | Facility: CLINIC | Age: 67
End: 2017-10-23
Payer: MEDICARE

## 2017-10-23 VITALS
HEART RATE: 77 BPM | WEIGHT: 193.69 LBS | BODY MASS INDEX: 25.67 KG/M2 | SYSTOLIC BLOOD PRESSURE: 122 MMHG | HEIGHT: 73 IN | DIASTOLIC BLOOD PRESSURE: 70 MMHG | TEMPERATURE: 97 F | OXYGEN SATURATION: 99 %

## 2017-10-23 DIAGNOSIS — K21.9 HIATAL HERNIA WITH GERD: Chronic | ICD-10-CM

## 2017-10-23 DIAGNOSIS — E78.2 MIXED HYPERLIPIDEMIA: ICD-10-CM

## 2017-10-23 DIAGNOSIS — I10 ESSENTIAL HYPERTENSION: Chronic | ICD-10-CM

## 2017-10-23 DIAGNOSIS — K44.9 HIATAL HERNIA WITH GERD: Chronic | ICD-10-CM

## 2017-10-23 DIAGNOSIS — Z00.00 WELL ADULT EXAM: Primary | ICD-10-CM

## 2017-10-23 DIAGNOSIS — Z96.0 URETERAL STENT RETAINED: Chronic | ICD-10-CM

## 2017-10-23 DIAGNOSIS — Z00.00 WELL ADULT EXAM: ICD-10-CM

## 2017-10-23 LAB
ALBUMIN SERPL BCP-MCNC: 3.4 G/DL
ALP SERPL-CCNC: 129 U/L
ALT SERPL W/O P-5'-P-CCNC: 8 U/L
ANION GAP SERPL CALC-SCNC: 7 MMOL/L
AST SERPL-CCNC: 12 U/L
BASOPHILS # BLD AUTO: 0.05 K/UL
BASOPHILS NFR BLD: 0.9 %
BILIRUB SERPL-MCNC: 0.4 MG/DL
BUN SERPL-MCNC: 16 MG/DL
CALCIUM SERPL-MCNC: 9 MG/DL
CHLORIDE SERPL-SCNC: 107 MMOL/L
CHOLEST SERPL-MCNC: 137 MG/DL
CHOLEST/HDLC SERPL: 3.7 {RATIO}
CO2 SERPL-SCNC: 24 MMOL/L
CREAT SERPL-MCNC: 1.8 MG/DL
DIFFERENTIAL METHOD: ABNORMAL
EOSINOPHIL # BLD AUTO: 0.2 K/UL
EOSINOPHIL NFR BLD: 4 %
ERYTHROCYTE [DISTWIDTH] IN BLOOD BY AUTOMATED COUNT: 13.3 %
EST. GFR  (AFRICAN AMERICAN): 43.7 ML/MIN/1.73 M^2
EST. GFR  (NON AFRICAN AMERICAN): 37.8 ML/MIN/1.73 M^2
GLUCOSE SERPL-MCNC: 95 MG/DL
HCT VFR BLD AUTO: 35.3 %
HDLC SERPL-MCNC: 37 MG/DL
HDLC SERPL: 27 %
HGB BLD-MCNC: 11.2 G/DL
IMM GRANULOCYTES # BLD AUTO: 0.03 K/UL
IMM GRANULOCYTES NFR BLD AUTO: 0.5 %
LDLC SERPL CALC-MCNC: 73.8 MG/DL
LYMPHOCYTES # BLD AUTO: 1.3 K/UL
LYMPHOCYTES NFR BLD: 22.4 %
MCH RBC QN AUTO: 28.4 PG
MCHC RBC AUTO-ENTMCNC: 31.7 G/DL
MCV RBC AUTO: 90 FL
MONOCYTES # BLD AUTO: 0.5 K/UL
MONOCYTES NFR BLD: 8.4 %
NEUTROPHILS # BLD AUTO: 3.7 K/UL
NEUTROPHILS NFR BLD: 63.8 %
NONHDLC SERPL-MCNC: 100 MG/DL
NRBC BLD-RTO: 0 /100 WBC
PLATELET # BLD AUTO: 203 K/UL
PMV BLD AUTO: 10.6 FL
POTASSIUM SERPL-SCNC: 4.1 MMOL/L
PROT SERPL-MCNC: 7.5 G/DL
RBC # BLD AUTO: 3.94 M/UL
SODIUM SERPL-SCNC: 138 MMOL/L
TRIGL SERPL-MCNC: 131 MG/DL
WBC # BLD AUTO: 5.81 K/UL

## 2017-10-23 PROCEDURE — 90662 IIV NO PRSV INCREASED AG IM: CPT | Mod: S$GLB,,, | Performed by: FAMILY MEDICINE

## 2017-10-23 PROCEDURE — 80061 LIPID PANEL: CPT

## 2017-10-23 PROCEDURE — 85025 COMPLETE CBC W/AUTO DIFF WBC: CPT

## 2017-10-23 PROCEDURE — 99397 PER PM REEVAL EST PAT 65+ YR: CPT | Mod: S$GLB,,, | Performed by: FAMILY MEDICINE

## 2017-10-23 PROCEDURE — 99499 UNLISTED E&M SERVICE: CPT | Mod: S$GLB,,, | Performed by: FAMILY MEDICINE

## 2017-10-23 PROCEDURE — G0008 ADMIN INFLUENZA VIRUS VAC: HCPCS | Mod: S$GLB,,, | Performed by: FAMILY MEDICINE

## 2017-10-23 PROCEDURE — 80053 COMPREHEN METABOLIC PANEL: CPT

## 2017-10-23 PROCEDURE — 36415 COLL VENOUS BLD VENIPUNCTURE: CPT | Mod: PO

## 2017-10-23 PROCEDURE — 99999 PR PBB SHADOW E&M-EST. PATIENT-LVL III: CPT | Mod: PBBFAC,,, | Performed by: FAMILY MEDICINE

## 2017-10-23 NOTE — PROGRESS NOTES
Chief Complaint:    Chief Complaint   Patient presents with    Follow-up       History of Present Illness:    Physical examination doing well no complaints he has hypertension, hyperlipidemia, Phantom limb syndrome, peripheral vascular disease, amputation status right below the knee.  Taking all his medicines no new complaints.    ROS:  Review of Systems   Constitutional: Negative for activity change, chills, fatigue, fever and unexpected weight change.   HENT: Negative for congestion, ear discharge, ear pain, hearing loss, postnasal drip and rhinorrhea.    Eyes: Negative for pain and visual disturbance.   Respiratory: Negative for cough, chest tightness and shortness of breath.    Cardiovascular: Negative for chest pain and palpitations.   Gastrointestinal: Negative for abdominal pain, diarrhea and vomiting.   Endocrine: Negative for heat intolerance.   Genitourinary: Negative for dysuria, flank pain, frequency and hematuria.   Musculoskeletal: Negative for back pain, gait problem and neck pain.   Skin: Negative for color change and rash.   Neurological: Negative for dizziness, tremors, seizures, numbness and headaches.   Psychiatric/Behavioral: Negative for agitation, hallucinations, self-injury, sleep disturbance and suicidal ideas. The patient is not nervous/anxious.        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    Late complications of amputation stump     rseolved with further amputation( MRSA then none since 2014)    Lipoma of colon     colonoscopy 2/21/2014    Myocardial infarction     per patient 2000 & 9/2012    Peripheral vascular disease   "   Phantom limb syndrome     patient reports only intermittent not problematic, not worsening    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    Spinal cord disease     L4L5 disc    Stroke     Tobacco dependence     resolved    Ureteral stent retained        Social History:  Social History     Social History    Marital status:      Spouse name: Laury    Number of children: 2    Years of education: N/A     Occupational History    Retired       [employer]     Social History Main Topics    Smoking status: Former Smoker     Packs/day: 1.00     Years: 15.00     Quit date: 1/1/2009    Smokeless tobacco: Never Used    Alcohol use No    Drug use: No      Comment: Is on prescription opiod, no non prescribed use    Sexual activity: Not Currently     Partners: Female     Other Topics Concern    None     Social History Narrative     . 4 children alive and well. 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  Still drives. Does not have a Living Will.       Family History:   family history includes COPD in his mother; Cancer in his mother; Diabetes in his daughter; Heart disease in his father.    Health Maintenance   Topic Date Due    Lipid Panel  08/15/2018    Colonoscopy  02/21/2019    Hepatitis C Screening  Completed    Abdominal Aortic Aneurysm Screening  Addressed       Physical Exam:    Vital Signs  Temp: 97.1 °F (36.2 °C)  Temp src: Tympanic  Pulse: 77  SpO2: 99 %  BP: 122/70  BP Location: Left arm  Patient Position: Sitting  Pain Score: 0-No pain  Height and Weight  Height: 6' 1" (185.4 cm)  Weight: 87.9 kg (193 lb 10.8 oz)  BSA (Calculated - sq m): 2.13 sq meters  BMI (Calculated): 25.6  Weight in (lb) to have BMI = 25: 189.1]    Body mass index is 25.55 kg/m².    Physical Exam   Constitutional: He is oriented to person, place, and time. He appears well-developed.   HENT:   Mouth/Throat: Oropharynx is clear and moist.   Eyes: " Conjunctivae are normal. Pupils are equal, round, and reactive to light.   Neck: Normal range of motion. Neck supple.   Cardiovascular: Normal rate, regular rhythm and normal heart sounds.    No murmur heard.  Pulmonary/Chest: Effort normal and breath sounds normal. No respiratory distress. He has no wheezes. He has no rales. He exhibits no tenderness.   Abdominal: Soft. He exhibits no distension and no mass. There is no tenderness. There is no guarding.   Musculoskeletal: He exhibits no edema or tenderness.   Right leg below the knee amputation    Left foot has a splint   Lymphadenopathy:     He has no cervical adenopathy.   Neurological: He is alert and oriented to person, place, and time. He has normal reflexes.   Skin: Skin is warm and dry.   Psychiatric: He has a normal mood and affect. His behavior is normal. Judgment and thought content normal.       Lab Results   Component Value Date    CHOL 122 08/15/2017    CHOL 141 12/29/2016    CHOL 168 12/04/2014    TRIG 126 08/15/2017    TRIG 214 (H) 12/29/2016    TRIG 149 12/04/2014    HDL 34 (L) 08/15/2017    HDL 29 (L) 12/29/2016    HDL 33 (L) 12/04/2014    TOTALCHOLEST 3.6 08/15/2017    TOTALCHOLEST 4.9 12/29/2016    TOTALCHOLEST 5.1 (H) 12/04/2014    NONHDLCHOL 88 08/15/2017    NONHDLCHOL 112 12/29/2016    NONHDLCHOL 135 12/04/2014       Lab Results   Component Value Date    HGBA1C 5.6 06/23/2016       Assessment:      ICD-10-CM ICD-9-CM   1. Well adult exam Z00.00 V70.0   2. Essential hypertension I10 401.9   3. Mixed hyperlipidemia E78.2 272.2   4. Ureteral stent retained Z96.0 V43.89   5. Hiatal hernia with GERD K21.9 530.81    K44.9 553.3         Plan:  He will continue current meds and plan.  We'll check his labs as below.  He will receive influenza vaccination.  Continue follow with urology for urethral stent placement.  Orders Placed This Encounter   Procedures    Influenza - High Dose (65+) (PF) (IM)    CBC auto differential    Comprehensive metabolic  panel    Lipid panel       Current Outpatient Prescriptions   Medication Sig Dispense Refill    amlodipine (NORVASC) 10 MG tablet TAKE 1 TABLET ONE TIME DAILY 90 tablet 3    carvedilol (COREG) 6.25 MG tablet Take 1 tablet (6.25 mg total) by mouth 2 (two) times daily. 180 tablet 3    clopidogrel (PLAVIX) 75 mg tablet TAKE 1 TABLET ONE TIME DAILY 90 tablet 3    docusate sodium (COLACE) 100 MG capsule Take 1 capsule (100 mg total) by mouth 2 (two) times daily. 60 capsule 0    losartan (COZAAR) 100 MG tablet Take 1 tablet (100 mg total) by mouth once daily. 90 tablet 3    omeprazole (PRILOSEC) 20 MG capsule Take 1 capsule (20 mg total) by mouth 2 (two) times daily. 90 capsule 3    oxycodone-acetaminophen (PERCOCET) 5-325 mg per tablet Take 1-2 tablets by mouth every 4 (four) hours as needed for Pain. 30 tablet 0    pravastatin (PRAVACHOL) 80 MG tablet Take 1 tablet (80 mg total) by mouth every evening. 90 tablet 3    triamcinolone acetonide 0.025% (KENALOG) 0.025 % cream Apply topically 2 (two) times daily. 15 g 0     No current facility-administered medications for this visit.        There are no discontinued medications.    Return in about 6 months (around 4/23/2018).      Norma Nuñez MD          Disclaimer: This note is prepared using voice recognition system and as such is likely to have errors and is not proof read.

## 2017-10-24 DIAGNOSIS — N17.9 ACUTE RENAL FAILURE, UNSPECIFIED ACUTE RENAL FAILURE TYPE: Primary | ICD-10-CM

## 2017-10-26 ENCOUNTER — LAB VISIT (OUTPATIENT)
Dept: LAB | Facility: HOSPITAL | Age: 67
End: 2017-10-26
Attending: FAMILY MEDICINE
Payer: MEDICARE

## 2017-10-26 DIAGNOSIS — N17.9 ACUTE RENAL FAILURE, UNSPECIFIED ACUTE RENAL FAILURE TYPE: ICD-10-CM

## 2017-10-26 LAB
ANION GAP SERPL CALC-SCNC: 11 MMOL/L
BUN SERPL-MCNC: 16 MG/DL
CALCIUM SERPL-MCNC: 9.3 MG/DL
CHLORIDE SERPL-SCNC: 107 MMOL/L
CO2 SERPL-SCNC: 19 MMOL/L
CREAT SERPL-MCNC: 1.5 MG/DL
EST. GFR  (AFRICAN AMERICAN): 54.5 ML/MIN/1.73 M^2
EST. GFR  (NON AFRICAN AMERICAN): 47.2 ML/MIN/1.73 M^2
GLUCOSE SERPL-MCNC: 120 MG/DL
POTASSIUM SERPL-SCNC: 4.2 MMOL/L
SODIUM SERPL-SCNC: 137 MMOL/L

## 2017-10-26 PROCEDURE — 80048 BASIC METABOLIC PNL TOTAL CA: CPT

## 2017-10-26 PROCEDURE — 36415 COLL VENOUS BLD VENIPUNCTURE: CPT | Mod: PO

## 2017-10-29 DIAGNOSIS — N17.9 ACUTE RENAL FAILURE, UNSPECIFIED ACUTE RENAL FAILURE TYPE: Primary | ICD-10-CM

## 2017-10-30 ENCOUNTER — TELEPHONE (OUTPATIENT)
Dept: FAMILY MEDICINE | Facility: CLINIC | Age: 67
End: 2017-10-30

## 2017-10-30 NOTE — TELEPHONE ENCOUNTER
Spoke with patient's wife. Explained that BMP was still not within baseline and needs to be repeated. Labs scheduled for next Tuesday.

## 2017-10-30 NOTE — TELEPHONE ENCOUNTER
----- Message from Norma Nuñez MD sent at 10/29/2017  6:03 PM CDT -----  There is some improvement in the renal function although still not at baseline, we can check a BMP in 1 week.  Orders are in, please schedule

## 2017-11-07 ENCOUNTER — LAB VISIT (OUTPATIENT)
Dept: LAB | Facility: HOSPITAL | Age: 67
End: 2017-11-07
Attending: FAMILY MEDICINE
Payer: MEDICARE

## 2017-11-07 DIAGNOSIS — N17.9 ACUTE RENAL FAILURE, UNSPECIFIED ACUTE RENAL FAILURE TYPE: ICD-10-CM

## 2017-11-07 LAB
ANION GAP SERPL CALC-SCNC: 11 MMOL/L
BUN SERPL-MCNC: 14 MG/DL
CALCIUM SERPL-MCNC: 9 MG/DL
CHLORIDE SERPL-SCNC: 107 MMOL/L
CO2 SERPL-SCNC: 20 MMOL/L
CREAT SERPL-MCNC: 1.5 MG/DL
EST. GFR  (AFRICAN AMERICAN): 54.5 ML/MIN/1.73 M^2
EST. GFR  (NON AFRICAN AMERICAN): 47.2 ML/MIN/1.73 M^2
GLUCOSE SERPL-MCNC: 136 MG/DL
POTASSIUM SERPL-SCNC: 4.1 MMOL/L
SODIUM SERPL-SCNC: 138 MMOL/L

## 2017-11-07 PROCEDURE — 80048 BASIC METABOLIC PNL TOTAL CA: CPT

## 2017-11-07 PROCEDURE — 36415 COLL VENOUS BLD VENIPUNCTURE: CPT | Mod: PO

## 2017-11-17 ENCOUNTER — TELEPHONE (OUTPATIENT)
Dept: FAMILY MEDICINE | Facility: CLINIC | Age: 67
End: 2017-11-17

## 2017-11-17 ENCOUNTER — OFFICE VISIT (OUTPATIENT)
Dept: FAMILY MEDICINE | Facility: CLINIC | Age: 67
End: 2017-11-17
Payer: MEDICARE

## 2017-11-17 VITALS
SYSTOLIC BLOOD PRESSURE: 138 MMHG | BODY MASS INDEX: 26.3 KG/M2 | TEMPERATURE: 99 F | DIASTOLIC BLOOD PRESSURE: 70 MMHG | HEIGHT: 73 IN | WEIGHT: 198.44 LBS | OXYGEN SATURATION: 97 % | HEART RATE: 95 BPM

## 2017-11-17 DIAGNOSIS — L02.32 BOIL OF BUTTOCK: Primary | ICD-10-CM

## 2017-11-17 PROCEDURE — 99213 OFFICE O/P EST LOW 20 MIN: CPT | Mod: S$GLB,,, | Performed by: FAMILY MEDICINE

## 2017-11-17 PROCEDURE — 99999 PR PBB SHADOW E&M-EST. PATIENT-LVL III: CPT | Mod: PBBFAC,,, | Performed by: FAMILY MEDICINE

## 2017-11-17 RX ORDER — CLINDAMYCIN HYDROCHLORIDE 300 MG/1
300 CAPSULE ORAL 3 TIMES DAILY
Qty: 30 CAPSULE | Refills: 0 | Status: SHIPPED | OUTPATIENT
Start: 2017-11-17 | End: 2018-01-09 | Stop reason: ALTCHOICE

## 2017-11-17 RX ORDER — DOXYCYCLINE HYCLATE 100 MG
100 TABLET ORAL EVERY 12 HOURS
Qty: 20 TABLET | Refills: 0 | Status: SHIPPED | OUTPATIENT
Start: 2017-11-17 | End: 2017-11-27

## 2017-11-17 NOTE — PROGRESS NOTES
Chief Complaint:    Chief Complaint   Patient presents with    Recurrent Skin Infections       History of Present Illness:    Presents with a boil for the last few days no fever    ROS:  Review of Systems   Constitutional: Negative for fever.       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    Late complications of amputation stump     rseolved with further amputation( MRSA then none since 2014)    Lipoma of colon     colonoscopy 2/21/2014    Myocardial infarction     per patient 2000 & 9/2012    Peripheral vascular disease     Phantom limb syndrome     patient reports only intermittent not problematic, not worsening    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    Spinal cord disease     L4L5 disc    Stroke     Tobacco dependence     resolved    Ureteral stent retained        Social History:  Social History     Social History    Marital status:      Spouse name: Laury    Number of children: 2    Years of education: N/A     Occupational History    Retired       [employer]     Social History Main Topics    Smoking status: Former Smoker     Packs/day: 1.00     Years: 15.00     Quit date: 1/1/2009    Smokeless tobacco: Never Used    Alcohol use No    Drug use: No      Comment: Is on prescription opiod, no non prescribed use    Sexual activity: Not Currently     Partners: Female     Other Topics Concern    None     Social History Narrative     . 4 children alive and well. 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  Still drives. Does not have a Living Will.  "      Family History:   family history includes COPD in his mother; Cancer in his mother; Diabetes in his daughter; Heart disease in his father.    Health Maintenance   Topic Date Due    Lipid Panel  10/23/2018    Colonoscopy  02/21/2019    TETANUS VACCINE  12/04/2024    Hepatitis C Screening  Completed    Zoster Vaccine  Completed    Pneumococcal (65+)  Completed    Influenza Vaccine  Completed    Abdominal Aortic Aneurysm Screening  Addressed       Physical Exam:    Vital Signs  Temp: 98.7 °F (37.1 °C)  Temp src: Tympanic  Pulse: 95  SpO2: 97 %  BP: 138/70  BP Location: Left arm  Patient Position: Sitting  Pain Score: 0-No pain  Height and Weight  Height: 6' 1" (185.4 cm)  Weight: 90 kg (198 lb 6.6 oz)  BSA (Calculated - sq m): 2.15 sq meters  BMI (Calculated): 26.2  Weight in (lb) to have BMI = 25: 189.1]    Body mass index is 26.18 kg/m².    Physical Exam   Musculoskeletal:        Back:        Lab Results   Component Value Date    CHOL 137 10/23/2017    CHOL 122 08/15/2017    CHOL 141 12/29/2016    TRIG 131 10/23/2017    TRIG 126 08/15/2017    TRIG 214 (H) 12/29/2016    HDL 37 (L) 10/23/2017    HDL 34 (L) 08/15/2017    HDL 29 (L) 12/29/2016    TOTALCHOLEST 3.7 10/23/2017    TOTALCHOLEST 3.6 08/15/2017    TOTALCHOLEST 4.9 12/29/2016    NONHDLCHOL 100 10/23/2017    NONHDLCHOL 88 08/15/2017    NONHDLCHOL 112 12/29/2016       Lab Results   Component Value Date    HGBA1C 5.6 06/23/2016       Assessment:      ICD-10-CM ICD-9-CM   1. Boil of buttock L02.32 680.5         Plan:  Start on clindamycin and doxy, if the boil increases in size please go to the hospital.  If it comes to a head over the weekend go to urgent care for an I&D or come back here next week.  Use warm rags.  No orders of the defined types were placed in this encounter.      Current Outpatient Prescriptions   Medication Sig Dispense Refill    amlodipine (NORVASC) 10 MG tablet TAKE 1 TABLET ONE TIME DAILY 90 tablet 3    carvedilol (COREG) 6.25 " MG tablet Take 1 tablet (6.25 mg total) by mouth 2 (two) times daily. 180 tablet 3    clopidogrel (PLAVIX) 75 mg tablet TAKE 1 TABLET ONE TIME DAILY 90 tablet 3    docusate sodium (COLACE) 100 MG capsule Take 1 capsule (100 mg total) by mouth 2 (two) times daily. 60 capsule 0    losartan (COZAAR) 100 MG tablet Take 1 tablet (100 mg total) by mouth once daily. 90 tablet 3    omeprazole (PRILOSEC) 20 MG capsule Take 1 capsule (20 mg total) by mouth 2 (two) times daily. 90 capsule 3    oxycodone-acetaminophen (PERCOCET) 5-325 mg per tablet Take 1-2 tablets by mouth every 4 (four) hours as needed for Pain. 30 tablet 0    pravastatin (PRAVACHOL) 80 MG tablet Take 1 tablet (80 mg total) by mouth every evening. 90 tablet 3    clindamycin (CLEOCIN) 300 MG capsule Take 1 capsule (300 mg total) by mouth 3 (three) times daily. 30 capsule 0    doxycycline (VIBRA-TABS) 100 MG tablet Take 1 tablet (100 mg total) by mouth every 12 (twelve) hours. 20 tablet 0    triamcinolone acetonide 0.025% (KENALOG) 0.025 % cream Apply topically 2 (two) times daily. 15 g 0     No current facility-administered medications for this visit.        There are no discontinued medications.    No Follow-up on file.      Norma Nuñez MD

## 2017-11-17 NOTE — TELEPHONE ENCOUNTER
----- Message from Jojo Farah sent at 11/17/2017  1:14 PM CST -----  Contact: pt  Please call pt @ [phone] regarding new script for antibiotic, pt states he have a bole, and pt uses [facility] on [address]

## 2018-01-09 ENCOUNTER — OFFICE VISIT (OUTPATIENT)
Dept: FAMILY MEDICINE | Facility: CLINIC | Age: 68
End: 2018-01-09
Payer: MEDICARE

## 2018-01-09 VITALS
BODY MASS INDEX: 26.07 KG/M2 | TEMPERATURE: 98 F | DIASTOLIC BLOOD PRESSURE: 60 MMHG | HEART RATE: 70 BPM | WEIGHT: 203.13 LBS | OXYGEN SATURATION: 98 % | SYSTOLIC BLOOD PRESSURE: 134 MMHG | HEIGHT: 74 IN

## 2018-01-09 DIAGNOSIS — M75.82 TENDINITIS OF LEFT ROTATOR CUFF: ICD-10-CM

## 2018-01-09 DIAGNOSIS — H91.93 BILATERAL HEARING LOSS, UNSPECIFIED HEARING LOSS TYPE: Primary | ICD-10-CM

## 2018-01-09 PROCEDURE — 99213 OFFICE O/P EST LOW 20 MIN: CPT | Mod: S$GLB,,, | Performed by: FAMILY MEDICINE

## 2018-01-09 PROCEDURE — 99999 PR PBB SHADOW E&M-EST. PATIENT-LVL III: CPT | Mod: PBBFAC,,, | Performed by: FAMILY MEDICINE

## 2018-01-09 NOTE — PROGRESS NOTES
Chief Complaint:    Chief Complaint   Patient presents with    Otalgia       History of Present Illness:  Presents today still has shoulder pain and limitation of range of motion and complaints of hearing loss.  Patient did not go for physical therapy last time.      ROS:  Review of Systems   Constitutional: Negative for activity change, chills, fatigue, fever and unexpected weight change.   HENT: Positive for hearing loss. Negative for congestion, ear discharge, ear pain, postnasal drip and rhinorrhea.    Eyes: Negative for pain and visual disturbance.   Respiratory: Negative for cough, chest tightness and shortness of breath.    Cardiovascular: Negative for chest pain and palpitations.   Gastrointestinal: Negative for abdominal pain, diarrhea and vomiting.   Endocrine: Negative for heat intolerance.   Genitourinary: Negative for dysuria, flank pain, frequency and hematuria.   Musculoskeletal: Negative for back pain, gait problem and neck pain.   Skin: Negative for color change and rash.   Neurological: Negative for dizziness, tremors, seizures, numbness and headaches.   Psychiatric/Behavioral: Negative for agitation, hallucinations, self-injury, sleep disturbance and suicidal ideas. The patient is not nervous/anxious.        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    Late complications of amputation stump     rseolved with further amputation( MRSA then none since 2014)    Lipoma of colon     colonoscopy 2/21/2014    Myocardial infarction     per patient 2000 & 9/2012    Peripheral vascular disease     Phantom limb syndrome     patient reports only  "intermittent not problematic, not worsening    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    Spinal cord disease     L4L5 disc    Stroke     Tobacco dependence     resolved    Ureteral stent retained        Social History:  Social History     Social History    Marital status:      Spouse name: Laury    Number of children: 2    Years of education: N/A     Occupational History    Retired       [employer]     Social History Main Topics    Smoking status: Former Smoker     Packs/day: 1.00     Years: 15.00     Quit date: 1/1/2009    Smokeless tobacco: Never Used    Alcohol use No    Drug use: No      Comment: Is on prescription opiod, no non prescribed use    Sexual activity: Not Currently     Partners: Female     Other Topics Concern    None     Social History Narrative     . 4 children alive and well. 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  Still drives. Does not have a Living Will.       Family History:   family history includes COPD in his mother; Cancer in his mother; Diabetes in his daughter; Heart disease in his father.    Health Maintenance   Topic Date Due    Lipid Panel  10/23/2018    Colonoscopy  02/21/2019    TETANUS VACCINE  12/04/2024    Hepatitis C Screening  Completed    Zoster Vaccine  Completed    Pneumococcal (65+)  Completed    Influenza Vaccine  Completed    Abdominal Aortic Aneurysm Screening  Addressed       Physical Exam:    Vital Signs  Temp: 97.7 °F (36.5 °C)  Temp src: Tympanic  Pulse: 70  SpO2: 98 %  BP: 134/60  BP Location: Left arm  Patient Position: Sitting  Pain Score:   8  Pain Loc: Shoulder  Height and Weight  Height: 6' 2" (188 cm)  Weight: 92.1 kg (203 lb 2.5 oz)  BSA (Calculated - sq m): 2.19 sq meters  BMI (Calculated): 26.1  Weight in (lb) to have BMI = 25: 194.3]    Body mass index is 26.08 kg/m².    Physical Exam   HENT:   Right Ear: External ear normal.   Left Ear: External ear " normal.   Musculoskeletal:   Impingement sign is positive left shoulder, normal internal rotation.  Limited range of motion of the shoulder.       Lab Results   Component Value Date    CHOL 137 10/23/2017    CHOL 122 08/15/2017    CHOL 141 12/29/2016    TRIG 131 10/23/2017    TRIG 126 08/15/2017    TRIG 214 (H) 12/29/2016    HDL 37 (L) 10/23/2017    HDL 34 (L) 08/15/2017    HDL 29 (L) 12/29/2016    TOTALCHOLEST 3.7 10/23/2017    TOTALCHOLEST 3.6 08/15/2017    TOTALCHOLEST 4.9 12/29/2016    NONHDLCHOL 100 10/23/2017    NONHDLCHOL 88 08/15/2017    NONHDLCHOL 112 12/29/2016       Lab Results   Component Value Date    HGBA1C 5.6 06/23/2016       Assessment:      ICD-10-CM ICD-9-CM   1. Bilateral hearing loss, unspecified hearing loss type H91.93 389.9   2. Tendinitis of left rotator cuff M75.82 726.10         Plan:  Offered hearing evaluation and hearing aid he refused.  He will need physical therapy to help the shoulder but she was think about it and let us know.  No orders of the defined types were placed in this encounter.      Current Outpatient Prescriptions   Medication Sig Dispense Refill    amlodipine (NORVASC) 10 MG tablet TAKE 1 TABLET ONE TIME DAILY 90 tablet 3    carvedilol (COREG) 6.25 MG tablet Take 1 tablet (6.25 mg total) by mouth 2 (two) times daily. 180 tablet 3    clopidogrel (PLAVIX) 75 mg tablet TAKE 1 TABLET ONE TIME DAILY 90 tablet 3    docusate sodium (COLACE) 100 MG capsule Take 1 capsule (100 mg total) by mouth 2 (two) times daily. 60 capsule 0    losartan (COZAAR) 100 MG tablet Take 1 tablet (100 mg total) by mouth once daily. 90 tablet 3    omeprazole (PRILOSEC) 20 MG capsule Take 1 capsule (20 mg total) by mouth 2 (two) times daily. 90 capsule 3    oxycodone-acetaminophen (PERCOCET) 5-325 mg per tablet Take 1-2 tablets by mouth every 4 (four) hours as needed for Pain. 30 tablet 0    pravastatin (PRAVACHOL) 80 MG tablet Take 1 tablet (80 mg total) by mouth every evening. 90 tablet 3     triamcinolone acetonide 0.025% (KENALOG) 0.025 % cream Apply topically 2 (two) times daily. 15 g 0     No current facility-administered medications for this visit.        Medications Discontinued During This Encounter   Medication Reason    clindamycin (CLEOCIN) 300 MG capsule Therapy completed       No Follow-up on file.      Norma Nuñez MD

## 2018-02-19 RX ORDER — OMEPRAZOLE 20 MG/1
CAPSULE, DELAYED RELEASE ORAL
Qty: 180 CAPSULE | Refills: 3 | Status: SHIPPED | OUTPATIENT
Start: 2018-02-19 | End: 2018-12-07 | Stop reason: ALTCHOICE

## 2018-03-19 ENCOUNTER — TELEPHONE (OUTPATIENT)
Dept: PODIATRY | Facility: CLINIC | Age: 68
End: 2018-03-19

## 2018-03-19 ENCOUNTER — OFFICE VISIT (OUTPATIENT)
Dept: FAMILY MEDICINE | Facility: CLINIC | Age: 68
End: 2018-03-19
Payer: MEDICARE

## 2018-03-19 VITALS
SYSTOLIC BLOOD PRESSURE: 123 MMHG | OXYGEN SATURATION: 97 % | DIASTOLIC BLOOD PRESSURE: 58 MMHG | BODY MASS INDEX: 26.8 KG/M2 | WEIGHT: 202.25 LBS | HEIGHT: 73 IN | RESPIRATION RATE: 18 BRPM | TEMPERATURE: 98 F | HEART RATE: 66 BPM

## 2018-03-19 DIAGNOSIS — L97.522 SKIN ULCER OF LEFT FOOT WITH FAT LAYER EXPOSED: Primary | ICD-10-CM

## 2018-03-19 PROCEDURE — 99499 UNLISTED E&M SERVICE: CPT | Mod: S$GLB,,, | Performed by: FAMILY MEDICINE

## 2018-03-19 PROCEDURE — 3078F DIAST BP <80 MM HG: CPT | Mod: CPTII,S$GLB,, | Performed by: FAMILY MEDICINE

## 2018-03-19 PROCEDURE — 99999 PR PBB SHADOW E&M-EST. PATIENT-LVL III: CPT | Mod: PBBFAC,,, | Performed by: FAMILY MEDICINE

## 2018-03-19 PROCEDURE — 3074F SYST BP LT 130 MM HG: CPT | Mod: CPTII,S$GLB,, | Performed by: FAMILY MEDICINE

## 2018-03-19 PROCEDURE — 99213 OFFICE O/P EST LOW 20 MIN: CPT | Mod: S$GLB,,, | Performed by: FAMILY MEDICINE

## 2018-03-19 NOTE — TELEPHONE ENCOUNTER
Returned pt's call to get him worked in today with Dr. Rios regarding ulcer on his foot. He was unable to due to work, stated that he would keep his appointment on the 4th with Dr. Reina. He is on the waiting list so that if a spot becomes available he would be contacted.

## 2018-03-19 NOTE — TELEPHONE ENCOUNTER
----- Message from Yuridia Lipscomb sent at 3/19/2018  3:59 PM CDT -----  Contact: pt  Pt calling to see if his prescription has been called in, he can be reached at [phone] Thanks

## 2018-03-19 NOTE — TELEPHONE ENCOUNTER
----- Message from Kena Ibarra sent at 3/19/2018 12:01 PM CDT -----  Contact: pt Wife   Call pt at [phone] (home)   Pt was returning a missed call from the nurse.

## 2018-03-19 NOTE — PROGRESS NOTES
Chief Complaint:    Chief Complaint   Patient presents with    Foot Ulcer       History of Present Illness:  Presents today he says he noticed an ulcer on the left foot.  Denies any fever.    ROS:  Review of Systems    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    Late complications of amputation stump     rseolved with further amputation( MRSA then none since 2014)    Lipoma of colon     colonoscopy 2/21/2014    Myocardial infarction     per patient 2000 & 9/2012    Peripheral vascular disease     Phantom limb syndrome     patient reports only intermittent not problematic, not worsening    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    Spinal cord disease     L4L5 disc    Stroke     Tobacco dependence     resolved    Ureteral stent retained        Social History:  Social History     Social History    Marital status:      Spouse name: Laury    Number of children: 2    Years of education: N/A     Occupational History    Retired       [employer]     Social History Main Topics    Smoking status: Former Smoker     Packs/day: 1.00     Years: 15.00     Quit date: 1/1/2009    Smokeless tobacco: Never Used    Alcohol use No    Drug use: No      Comment: Is on prescription opiod, no non prescribed use    Sexual activity: Not Currently     Partners: Female     Other Topics Concern    None     Social History Narrative     . 4 children alive and well. 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  Still drives. Does not have a Living Will.       Family History:   family history  "includes COPD in his mother; Cancer in his mother; Diabetes in his daughter; Heart disease in his father.    Health Maintenance   Topic Date Due    Lipid Panel  10/23/2018    Colonoscopy  02/21/2019    TETANUS VACCINE  12/04/2024    Hepatitis C Screening  Completed    Zoster Vaccine  Completed    Pneumococcal (65+)  Completed    Influenza Vaccine  Completed    Abdominal Aortic Aneurysm Screening  Addressed       Physical Exam:    Vital Signs  Temp: 97.5 °F (36.4 °C)  Pulse: 66  Resp: 18  SpO2: 97 %  BP: (!) 123/58  Pain Score: 0-No pain  Height and Weight  Height: 6' 1" (185.4 cm)  Weight: 91.7 kg (202 lb 4.4 oz)  BSA (Calculated - sq m): 2.17 sq meters  BMI (Calculated): 26.7  Weight in (lb) to have BMI = 25: 189.1]    Body mass index is 26.69 kg/m².    Physical Exam    Lab Results   Component Value Date    CHOL 137 10/23/2017    CHOL 122 08/15/2017    CHOL 141 12/29/2016    TRIG 131 10/23/2017    TRIG 126 08/15/2017    TRIG 214 (H) 12/29/2016    HDL 37 (L) 10/23/2017    HDL 34 (L) 08/15/2017    HDL 29 (L) 12/29/2016    TOTALCHOLEST 3.7 10/23/2017    TOTALCHOLEST 3.6 08/15/2017    TOTALCHOLEST 4.9 12/29/2016    NONHDLCHOL 100 10/23/2017    NONHDLCHOL 88 08/15/2017    NONHDLCHOL 112 12/29/2016       Lab Results   Component Value Date    HGBA1C 5.6 06/23/2016           Assessment:      ICD-10-CM ICD-9-CM   1. Skin ulcer of left foot with fat layer exposed L97.522 707.15         Plan:  Attempted to contact Dr. Reina, she was off for the day offered patient an appointment with Dr. Rios, but he could not make it today.  Dr. Reina later contacted us and assured us that she will see him Wednesday.  Antibiotic dressing applied in the interim.  He will need debridement of the ulcer.  No signs of secondary infection seen.  No orders of the defined types were placed in this encounter.      Current Outpatient Prescriptions   Medication Sig Dispense Refill    amlodipine (NORVASC) 10 MG tablet TAKE 1 TABLET ONE TIME DAILY 90 " tablet 3    carvedilol (COREG) 6.25 MG tablet Take 1 tablet (6.25 mg total) by mouth 2 (two) times daily. 180 tablet 3    clopidogrel (PLAVIX) 75 mg tablet TAKE 1 TABLET ONE TIME DAILY 90 tablet 3    docusate sodium (COLACE) 100 MG capsule Take 1 capsule (100 mg total) by mouth 2 (two) times daily. 60 capsule 0    losartan (COZAAR) 100 MG tablet Take 1 tablet (100 mg total) by mouth once daily. 90 tablet 3    omeprazole (PRILOSEC) 20 MG capsule TAKE 1 CAPSULE TWICE DAILY 180 capsule 3    oxycodone-acetaminophen (PERCOCET) 5-325 mg per tablet Take 1-2 tablets by mouth every 4 (four) hours as needed for Pain. 30 tablet 0    pravastatin (PRAVACHOL) 80 MG tablet Take 1 tablet (80 mg total) by mouth every evening. 90 tablet 3    triamcinolone acetonide 0.025% (KENALOG) 0.025 % cream Apply topically 2 (two) times daily. 15 g 0     No current facility-administered medications for this visit.        There are no discontinued medications.    No Follow-up on file.      Norma Nuñez MD

## 2018-03-20 RX ORDER — CEPHALEXIN 500 MG/1
500 CAPSULE ORAL EVERY 8 HOURS
Qty: 21 CAPSULE | Refills: 0 | Status: SHIPPED | OUTPATIENT
Start: 2018-03-20 | End: 2018-04-19 | Stop reason: ALTCHOICE

## 2018-03-20 NOTE — TELEPHONE ENCOUNTER
----- Message from Oliver Arshad sent at 3/20/2018 12:16 PM CDT -----  Contact: pt   Pt is requesting a call back from nurse in regards to his med not being at the pharmacy.       [phone] (home)            [facility] [facility] [address] [address]  Phone: [phone] Fax: [phone]

## 2018-03-22 ENCOUNTER — OFFICE VISIT (OUTPATIENT)
Dept: PODIATRY | Facility: CLINIC | Age: 68
End: 2018-03-22
Payer: MEDICARE

## 2018-03-22 ENCOUNTER — HOSPITAL ENCOUNTER (OUTPATIENT)
Dept: RADIOLOGY | Facility: HOSPITAL | Age: 68
Discharge: HOME | End: 2018-03-22
Attending: PODIATRIST
Payer: MEDICARE

## 2018-03-22 VITALS
HEIGHT: 73 IN | WEIGHT: 201.25 LBS | BODY MASS INDEX: 26.67 KG/M2 | HEART RATE: 68 BPM | RESPIRATION RATE: 16 BRPM | DIASTOLIC BLOOD PRESSURE: 70 MMHG | SYSTOLIC BLOOD PRESSURE: 147 MMHG

## 2018-03-22 DIAGNOSIS — L97.502 ULCER OF FOOT WITH FAT LAYER EXPOSED, UNSPECIFIED LATERALITY: ICD-10-CM

## 2018-03-22 DIAGNOSIS — I73.9 PERIPHERAL VASCULAR DISEASE: ICD-10-CM

## 2018-03-22 DIAGNOSIS — I73.9 PERIPHERAL VASCULAR DISEASE: Primary | ICD-10-CM

## 2018-03-22 DIAGNOSIS — S98.132A AMPUTATED TOE OF LEFT FOOT: ICD-10-CM

## 2018-03-22 DIAGNOSIS — Z89.519 S/P UNILATERAL BKA (BELOW KNEE AMPUTATION): ICD-10-CM

## 2018-03-22 PROCEDURE — 87077 CULTURE AEROBIC IDENTIFY: CPT

## 2018-03-22 PROCEDURE — 3077F SYST BP >= 140 MM HG: CPT | Mod: CPTII,S$GLB,, | Performed by: PODIATRIST

## 2018-03-22 PROCEDURE — 11042 DBRDMT SUBQ TIS 1ST 20SQCM/<: CPT | Mod: S$GLB,,, | Performed by: PODIATRIST

## 2018-03-22 PROCEDURE — 73630 X-RAY EXAM OF FOOT: CPT | Mod: 26,LT,, | Performed by: RADIOLOGY

## 2018-03-22 PROCEDURE — 87186 SC STD MICRODIL/AGAR DIL: CPT

## 2018-03-22 PROCEDURE — 99999 PR PBB SHADOW E&M-EST. PATIENT-LVL III: CPT | Mod: PBBFAC,,, | Performed by: PODIATRIST

## 2018-03-22 PROCEDURE — 99203 OFFICE O/P NEW LOW 30 MIN: CPT | Mod: 25,S$GLB,, | Performed by: PODIATRIST

## 2018-03-22 PROCEDURE — 87070 CULTURE OTHR SPECIMN AEROBIC: CPT

## 2018-03-22 PROCEDURE — 73630 X-RAY EXAM OF FOOT: CPT | Mod: TC,LT

## 2018-03-22 PROCEDURE — 3078F DIAST BP <80 MM HG: CPT | Mod: CPTII,S$GLB,, | Performed by: PODIATRIST

## 2018-03-22 PROCEDURE — 99499 UNLISTED E&M SERVICE: CPT | Mod: S$GLB,,, | Performed by: PODIATRIST

## 2018-03-22 PROCEDURE — 87075 CULTR BACTERIA EXCEPT BLOOD: CPT

## 2018-03-22 NOTE — PROGRESS NOTES
Office Visit 3/22/2018  Last encounter in this department: Visit date not found    Subjective:      Patient ID: Kodi Ribeiro is a 68 y.o. male.    Chief Complaint: Foot Ulcer (Left foot, started apx 2 weeks ago, rates pain 4/10, wears prosthetic shoe, last visit with PCP Dr. Norma Nuñez on 3/19/18)    Kodi is a 68 y.o. male who presents to the clinic for evaluation and treatment of high risk feet. 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 The patient's chief complaint is diabetic foot ulcer, left TMA stump. This patient has documented high risk feet requiring routine maintenance secondary to peripheral vascular disease.  Patient is here with his wife who reports that only a few weeks ago they started noticing some discoloration along the left forefoot stump.  He had recently switched prosthetics and believes that that may have contributed to this.  2 weeks ago the discoloration sloughed off with the ulcer noted underneath.    PCP: Norma Nuñez MD    Date Last Seen by PCP: 3/19/18    Current shoe gear:  Affected Foot: Tennis shoes     Unaffected Foot: N/A    History of Trauma: negative  Sign of Infection: none    Hemoglobin A1C   Date Value Ref Range Status   06/23/2016 5.6 4.5 - 6.2 % Final   12/04/2012 5.9 4.0 - 6.2 % Final   06/27/2012 5.3 4.0 - 6.2 % Final       Review of Systems   Constitution: Negative for chills and fever.   Cardiovascular: Negative for chest pain.   Respiratory:  Negative for shortness of breath.    Gastrointestinal: Negative for nausea and vomiting.           Objective:      Physical Exam   Constitutional: He is oriented to person, place, and time. He appears well-developed and well-nourished. No distress.   Cardiovascular:   Pulses:       Dorsalis pedis pulses are 1+ on the left side.        Posterior tibial pulses are 0 on the left side.   Capillary fill time 3-5 seconds left stump   Musculoskeletal:   Lower extremities:    Deformities: right bka, left tma    Normal arch height and rectus left.     4/5 muscle strength and tone in all four quadrants left.     Pain-free range of motion in all four quadrants with stiffness and limitation left.     Pain: None     Neurological: He is alert and oriented to person, place, and time. He displays no Babinski's sign on the left side.   Plantar protective threshold sensation by touch via 5.07 SWMF absent left tma   Skin:   Lower extremities:    Thin, dry, atrophic left.  Distal cool with proximal foot warmth left.    Wound: 1.5x0.7x0.8cm left distal 2nd met stump probing to bone with granular base.  Drainage: Serosanguinous  Malodor: None  Erythema: Mild local periwound  Edema: Mild local periwound  Varicosities: None  Pigmentary changes: None  Interdigital maceration: None  Skin lesions: None       Psychiatric: He has a normal mood and affect.   Nursing note and vitals reviewed.            X-rays: ordered    Assessment:       Encounter Diagnoses   Name Primary?    Peripheral vascular disease Yes    Ulcer of foot with fat layer exposed, unspecified laterality - Left Foot     Amputated toe of left foot     S/P unilateral BKA (below knee amputation) - Right Foot          Plan:       Kodi was seen today for foot ulcer.    Diagnoses and all orders for this visit:    Peripheral vascular disease  -     X-Ray Foot Complete Left; Future  -     Aerobic culture  -     Culture, Anaerobic  -     CBC auto differential; Future    Ulcer of  foot with fat layer exposed, unspecified laterality - Left Foot  -     X-Ray Foot Complete Left; Future  -     Aerobic culture  -     Culture, Anaerobic  -     CBC auto differential; Future    Amputated toe of left foot    S/P unilateral BKA (below knee amputation) - Right Foot      I counseled the patient on his conditions, their implications and medical management.    Ulcer wound was cleaned with alcohol wipes and sharply excisionally debrided of viable and nonviable tissue including dermal tissue, subcutaneous tissue and good bleeding granular tissue utilizing sterile #15 blade, tissue nipper and  forceps. Swab was obtained from base and submitted for C&S and x-rays were ordered to assess underlying osseous involvement.  CBC was also ordered.  Wound was irrigated with sterile saline and bleeding was controlled with direct pressure. The patient tolerated this well.  Wound was then dressed with povidone-iodine ointment, xeroform, 4x4, seda and placed in modified post operative shoe. Patient was given instructions on daily dressing changes. Patient was also instructed on the importance of keeping the wound and dressings clean dry and intact and keeping pressure off the wound area until complete healing of the wound.  Patient has a prescription from Dr. Nuñez for cephalexin which he continues.  Antibiotics to be adjusted according to culture results.

## 2018-03-26 LAB — BACTERIA SPEC AEROBE CULT: NORMAL

## 2018-03-27 LAB — BACTERIA SPEC ANAEROBE CULT: NORMAL

## 2018-03-28 ENCOUNTER — OFFICE VISIT (OUTPATIENT)
Dept: PODIATRY | Facility: CLINIC | Age: 68
End: 2018-03-28
Payer: MEDICARE

## 2018-03-28 VITALS
HEART RATE: 67 BPM | WEIGHT: 201.25 LBS | DIASTOLIC BLOOD PRESSURE: 71 MMHG | SYSTOLIC BLOOD PRESSURE: 149 MMHG | HEIGHT: 73 IN | BODY MASS INDEX: 26.67 KG/M2

## 2018-03-28 DIAGNOSIS — I73.9 PERIPHERAL VASCULAR DISEASE: ICD-10-CM

## 2018-03-28 DIAGNOSIS — L97.502 ULCER OF FOOT WITH FAT LAYER EXPOSED, UNSPECIFIED LATERALITY: Primary | ICD-10-CM

## 2018-03-28 DIAGNOSIS — S98.132A AMPUTATED TOE OF LEFT FOOT: ICD-10-CM

## 2018-03-28 DIAGNOSIS — Z89.519 S/P UNILATERAL BKA (BELOW KNEE AMPUTATION): ICD-10-CM

## 2018-03-28 PROCEDURE — 99999 PR PBB SHADOW E&M-EST. PATIENT-LVL III: CPT | Mod: PBBFAC,,, | Performed by: PODIATRIST

## 2018-03-28 PROCEDURE — 99213 OFFICE O/P EST LOW 20 MIN: CPT | Mod: 25,S$GLB,, | Performed by: PODIATRIST

## 2018-03-28 PROCEDURE — 3077F SYST BP >= 140 MM HG: CPT | Mod: CPTII,S$GLB,, | Performed by: PODIATRIST

## 2018-03-28 PROCEDURE — 99499 UNLISTED E&M SERVICE: CPT | Mod: S$GLB,,, | Performed by: PODIATRIST

## 2018-03-28 PROCEDURE — 11042 DBRDMT SUBQ TIS 1ST 20SQCM/<: CPT | Mod: S$GLB,,, | Performed by: PODIATRIST

## 2018-03-28 PROCEDURE — 3078F DIAST BP <80 MM HG: CPT | Mod: CPTII,S$GLB,, | Performed by: PODIATRIST

## 2018-03-28 RX ORDER — SULFAMETHOXAZOLE AND TRIMETHOPRIM 400; 80 MG/1; MG/1
1 TABLET ORAL 2 TIMES DAILY
Qty: 20 TABLET | Refills: 0 | Status: SHIPPED | OUTPATIENT
Start: 2018-03-28 | End: 2018-04-07

## 2018-03-28 RX ORDER — MUPIROCIN 20 MG/G
OINTMENT TOPICAL DAILY
Qty: 1 TUBE | Refills: 2 | Status: SHIPPED | OUTPATIENT
Start: 2018-03-28 | End: 2018-03-28 | Stop reason: SDUPTHER

## 2018-03-28 RX ORDER — SULFAMETHOXAZOLE AND TRIMETHOPRIM 400; 80 MG/1; MG/1
1 TABLET ORAL 2 TIMES DAILY
Qty: 20 TABLET | Refills: 0 | Status: SHIPPED | OUTPATIENT
Start: 2018-03-28 | End: 2018-03-28 | Stop reason: SDUPTHER

## 2018-03-28 RX ORDER — MUPIROCIN 20 MG/G
OINTMENT TOPICAL DAILY
Qty: 1 TUBE | Refills: 2 | Status: SHIPPED | OUTPATIENT
Start: 2018-03-28 | End: 2018-04-07

## 2018-03-28 NOTE — PROGRESS NOTES
Office Visit 3/28/2018  Last encounter in this department: 3/22/2018    Subjective:      Patient ID: Kodi Ribeiro is a 68 y.o. male.    Chief Complaint: Follow-up (PCP Dr. Nuñez 03/19/18, 1 week wound check, no other major concerns or issues. Pt is not a diabetic)    Kodi is a 68 y.o. male who presents to the clinic for evaluation and treatment of high risk feet. 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 The patient's chief complaint is foot ulcer, left foot stump. This patient has documented high risk feet requiring routine maintenance secondary to peripheral vascular disease.    PCP: Norma Nuñez MD    Date Last Seen by PCP: 3/19/18    Current shoe gear:  Affected Foot: po     Unaffected Foot: N/A    History of Trauma: negative  Sign of Infection: none    Hemoglobin A1C   Date Value Ref Range Status   06/23/2016 5.6 4.5 - 6.2 % Final   12/04/2012 5.9 4.0 - 6.2 % Final   06/27/2012 5.3 4.0 - 6.2 % Final       Review of Systems   Constitution: Negative for chills and fever.   Cardiovascular: Negative for chest pain.   Respiratory: Negative for shortness of breath.    Gastrointestinal: Negative for nausea and vomiting.           Objective:      Physical Exam   Constitutional: He is oriented to person, place, and time. He appears well-developed and well-nourished. No distress.   Cardiovascular:   Pulses:       Dorsalis pedis pulses are 1+ on the left side.        Posterior tibial pulses are  "0 on the left side.   Capillary fill time 3-5 seconds left stump   Musculoskeletal:   Lower extremities:    Deformities: right bka, left tma    Normal arch height and rectus left.    4/5 muscle strength and tone in all four quadrants left.     Pain-free range of motion in all four quadrants with stiffness and limitation left.     Pain: None     Neurological: He is alert and oriented to person, place, and time. He displays no Babinski's sign on the left side.   Plantar protective threshold sensation by touch via 5.07 SWMF absent left tma   Skin:   Lower extremities:    Thin, dry, atrophic left.  Distal cool with proximal foot warmth left.    Wound: 1.8x0.7x0.7cm left distal 2nd met stump probing to bone with granular base.  Drainage: Serosanguinous  Malodor: None  Erythema: Mild local periwound  Edema: Mild local periwound  Varicosities: None  Pigmentary changes: None  Interdigital maceration: None  Skin lesions: None       Psychiatric: He has a normal mood and affect.   Nursing note and vitals reviewed.       Aerobic Bacterial Culture --    STAPHYLOCOCCUS AUREUS   Few    Susceptibility      Staphylococcus aureus     CULTURE, AEROBIC  (SPECIFY SOURCE)     Clindamycin <=0.5 "><=0.5  Sensitive     Erythromycin >4  Resistant     Oxacillin 0.5  Sensitive     Penicillin 8  Resistant     Tetracycline <=4 "><=4  Sensitive     Trimeth/Sulfa <=0.5/9.5 "><=0.5/9.5  Sensitive                 Culture, Anaerobic [[id number]] Collected: 03/22/18 1109   Order Status: Completed Specimen: Wound from Foot, Left Updated: 03/27/18 0923    Anaerobic Culture No anaerobes isolated           X-rays: area of radiolucency likely air communicating with ulcer. No sánchez erosive changes.  Lab Results   Component Value Date    WBC 6.65 03/22/2018    HGB 10.4 (L) 03/22/2018    HCT 34.2 (L) 03/22/2018    MCV 94 03/22/2018     03/22/2018         Assessment:       Encounter Diagnoses   Name Primary?    Ulcer of foot with fat layer exposed, " unspecified laterality - Left Foot Yes    Peripheral vascular disease     Amputated toe of left foot     S/P unilateral BKA (below knee amputation) - Right Foot          Plan:       Koid was seen today for follow-up.    Diagnoses and all orders for this visit:    Ulcer of foot with fat layer exposed, unspecified laterality - Left Foot    Peripheral vascular disease    Amputated toe of left foot    S/P unilateral BKA (below knee amputation) - Right Foot    Other orders  -     Discontinue: sulfamethoxazole-trimethoprim 400-80mg (BACTRIM) 400-80 mg per tablet; Take 1 tablet by mouth 2 (two) times daily.  -     Discontinue: mupirocin (BACTROBAN) 2 % ointment; Apply topically once daily.  -     sulfamethoxazole-trimethoprim 400-80mg (BACTRIM) 400-80 mg per tablet; Take 1 tablet by mouth 2 (two) times daily.  -     mupirocin (BACTROBAN) 2 % ointment; Apply topically once daily.      I counseled the patient on his conditions, their implications and medical management.  CBC, cultures, and x-rays were reviewed with the patient.  We discussed beginning a new antibiotic for complete coverage a stone cultures.    Ulcer wound was cleaned with alcohol wipes and sharply excisionally debrided of viable and nonviable tissue including dermal tissue, subcutaneous tissue and good bleeding granular tissue utilizing sterile #15 blade, tissue nipper and  forceps. Wound was irrigated with sterile saline and bleeding was controlled with direct pressure. The patient tolerated this well. Wound was then dressed with povidone-iodine ointment, xeroform, 4x4, seda and placed in modified post operative shoe. Patient was given instructions on daily dressing changes. Patient was also instructed on the importance of keeping the wound and dressings clean dry and intact and keeping pressure off the wound area until complete healing of the wound. Prescriptions were written for Bactroban and bactrim.

## 2018-04-02 RX ORDER — PRAVASTATIN SODIUM 80 MG/1
TABLET ORAL
Qty: 90 TABLET | Refills: 3 | Status: SHIPPED | OUTPATIENT
Start: 2018-04-02 | End: 2019-03-15 | Stop reason: SDUPTHER

## 2018-04-05 ENCOUNTER — OFFICE VISIT (OUTPATIENT)
Dept: PODIATRY | Facility: CLINIC | Age: 68
End: 2018-04-05
Payer: MEDICARE

## 2018-04-05 VITALS
HEIGHT: 73 IN | SYSTOLIC BLOOD PRESSURE: 143 MMHG | BODY MASS INDEX: 26.67 KG/M2 | WEIGHT: 201.25 LBS | HEART RATE: 60 BPM | DIASTOLIC BLOOD PRESSURE: 66 MMHG

## 2018-04-05 DIAGNOSIS — Z89.519 S/P UNILATERAL BKA (BELOW KNEE AMPUTATION): ICD-10-CM

## 2018-04-05 DIAGNOSIS — S98.132A AMPUTATED TOE OF LEFT FOOT: ICD-10-CM

## 2018-04-05 DIAGNOSIS — I73.9 PERIPHERAL VASCULAR DISEASE: ICD-10-CM

## 2018-04-05 DIAGNOSIS — L97.502 ULCER OF FOOT WITH FAT LAYER EXPOSED, UNSPECIFIED LATERALITY: Primary | ICD-10-CM

## 2018-04-05 PROCEDURE — 99999 PR PBB SHADOW E&M-EST. PATIENT-LVL II: CPT | Mod: PBBFAC,,, | Performed by: PODIATRIST

## 2018-04-05 PROCEDURE — 99499 UNLISTED E&M SERVICE: CPT | Mod: S$GLB,,, | Performed by: PODIATRIST

## 2018-04-05 PROCEDURE — 11042 DBRDMT SUBQ TIS 1ST 20SQCM/<: CPT | Mod: S$GLB,,, | Performed by: PODIATRIST

## 2018-04-05 NOTE — PROGRESS NOTES
Office Visit 4/5/2018  Last encounter in this department: 3/28/2018    Subjective:      Patient ID: Kodi Ribeiro is a 68 y.o. male.    Chief Complaint: Follow-up (PCP Dr. Nuñez 03/19/18, 1 week wound check on left foot, pt states that it doing alot better. Pt is not a diabetic and is wearing a tennis shoes)    Kodi is a 68 y.o. male who presents to the clinic for evaluation and treatment of high risk feet. 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 The patient's chief complaint is foot ulcer, left tma stump. This patient has documented high risk feet requiring routine maintenance secondary to peripheral vascular disease.    PCP: Norma Nuñez MD    Date Last Seen by PCP: 3/19/18    Current shoe gear:  Affected Foot: po     Unaffected Foot: N/A    History of Trauma: negative  Sign of Infection: none    Hemoglobin A1C   Date Value Ref Range Status   06/23/2016 5.6 4.5 - 6.2 % Final   12/04/2012 5.9 4.0 - 6.2 % Final   06/27/2012 5.3 4.0 - 6.2 % Final       Review of Systems   Constitution: Negative for chills and fever.   Cardiovascular: Negative for chest pain.   Respiratory: Negative for shortness of breath.    Gastrointestinal: Negative for nausea and vomiting.           Objective:      Physical Exam   Constitutional: He is oriented to person, place, and time. He appears well-developed and well-nourished. No distress.   Cardiovascular:   Pulses:       Dorsalis pedis pulses are 1+ on the  left side.        Posterior tibial pulses are 0 on the left side.   Capillary fill time 3-5 seconds left stump   Musculoskeletal:   Lower extremities:    Deformities: right bka, left tma    Normal arch height and rectus left.    4/5 muscle strength and tone in all four quadrants left.     Pain-free range of motion in all four quadrants with stiffness and limitation left.     Pain: None     Neurological: He is alert and oriented to person, place, and time. He displays no Babinski's sign on the left side.   Plantar protective threshold sensation by touch via 5.07 SWMF absent left tma   Skin:   Lower extremities:    Thin, dry, atrophic left.  Distal cool with proximal foot warmth left.    Wound: 1.2x0.6x0.7cm left distal 2nd met stump probing to bone with granular base.  Drainage: Serosanguinous  Malodor: None  Erythema: Mild local periwound  Edema: Mild local periwound  Varicosities: None  Pigmentary changes: None  Interdigital maceration: None  Skin lesions: None       Psychiatric: He has a normal mood and affect.   Nursing note and vitals reviewed.                Assessment:       Encounter Diagnoses   Name Primary?    Ulcer of foot with fat layer exposed, unspecified laterality - Left Foot Yes    Peripheral vascular disease     Amputated toe of left foot     S/P unilateral BKA (below knee amputation) - Right Foot          Plan:       Kodi was seen today for follow-up.    Diagnoses and all orders for this visit:    Ulcer of foot with fat layer exposed, unspecified laterality - Left Foot    Peripheral vascular disease    Amputated toe of left foot    S/P unilateral BKA (below knee amputation) - Right Foot      I counseled the patient on his conditions, their implications and medical management.    Ulcer wound was cleaned with alcohol wipes and sharply excisionally debrided of viable and nonviable tissue including dermal tissue, subcutaneous tissue and good bleeding granular tissue utilizing sterile #15 blade,  tissue nipper and  forceps.  Wound was irrigated with sterile saline and bleeding was controlled with direct pressure. The patient tolerated this well.  Wound was then dressed with povidone-iodine ointment, xeroform, 4x4, seda and placed in modified post operative shoe. Patient was given instructions on daily dressing changes. Patient was also instructed on the importance of keeping the wound and dressings clean dry and intact and keeping pressure off the wound area until complete healing of the wound.

## 2018-04-08 RX ORDER — CLOPIDOGREL BISULFATE 75 MG/1
TABLET ORAL
Qty: 90 TABLET | Refills: 3 | Status: SHIPPED | OUTPATIENT
Start: 2018-04-08 | End: 2019-05-13 | Stop reason: SDUPTHER

## 2018-04-12 ENCOUNTER — OFFICE VISIT (OUTPATIENT)
Dept: PODIATRY | Facility: CLINIC | Age: 68
End: 2018-04-12
Payer: MEDICARE

## 2018-04-12 VITALS
DIASTOLIC BLOOD PRESSURE: 69 MMHG | HEART RATE: 67 BPM | RESPIRATION RATE: 16 BRPM | HEIGHT: 73 IN | SYSTOLIC BLOOD PRESSURE: 154 MMHG

## 2018-04-12 DIAGNOSIS — Z89.519 S/P UNILATERAL BKA (BELOW KNEE AMPUTATION): ICD-10-CM

## 2018-04-12 DIAGNOSIS — S98.132A AMPUTATED TOE OF LEFT FOOT: ICD-10-CM

## 2018-04-12 DIAGNOSIS — L97.502 ULCER OF FOOT WITH FAT LAYER EXPOSED, UNSPECIFIED LATERALITY: Primary | ICD-10-CM

## 2018-04-12 DIAGNOSIS — I73.9 PERIPHERAL VASCULAR DISEASE: ICD-10-CM

## 2018-04-12 PROCEDURE — 11042 DBRDMT SUBQ TIS 1ST 20SQCM/<: CPT | Mod: S$GLB,,, | Performed by: PODIATRIST

## 2018-04-12 PROCEDURE — 3077F SYST BP >= 140 MM HG: CPT | Mod: CPTII,S$GLB,, | Performed by: PODIATRIST

## 2018-04-12 PROCEDURE — 87070 CULTURE OTHR SPECIMN AEROBIC: CPT

## 2018-04-12 PROCEDURE — 99999 PR PBB SHADOW E&M-EST. PATIENT-LVL III: CPT | Mod: PBBFAC,,, | Performed by: PODIATRIST

## 2018-04-12 PROCEDURE — 99213 OFFICE O/P EST LOW 20 MIN: CPT | Mod: 25,S$GLB,, | Performed by: PODIATRIST

## 2018-04-12 PROCEDURE — 87075 CULTR BACTERIA EXCEPT BLOOD: CPT

## 2018-04-12 PROCEDURE — 3078F DIAST BP <80 MM HG: CPT | Mod: CPTII,S$GLB,, | Performed by: PODIATRIST

## 2018-04-12 PROCEDURE — 87076 CULTURE ANAEROBE IDENT EACH: CPT

## 2018-04-12 NOTE — PROGRESS NOTES
Office Visit 4/12/2018  Last encounter in this department: 4/5/2018    Subjective:      Patient ID: Kodi Ribeiro is a 68 y.o. male.    Chief Complaint: Wound Check (1 week wound check, no c/o pain, last visit with PCP Dr. Nuñez on 3/19/18)    Kodi is a 68 y.o. male who presents to the clinic for evaluation and treatment of high risk feet. 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 The patient's chief complaint is foot ulcer, left tma stump. This patient has documented high risk feet requiring routine maintenance secondary to peripheral vascular disease.  Patient has completed his antibiotics since last visit.    PCP: Norma Nuñez MD    Date Last Seen by PCP: 3/19/18    Current shoe gear:  Affected Foot: po     Unaffected Foot: N/A    History of Trauma: negative  Sign of Infection: none    Hemoglobin A1C   Date Value Ref Range Status   06/23/2016 5.6 4.5 - 6.2 % Final   12/04/2012 5.9 4.0 - 6.2 % Final   06/27/2012 5.3 4.0 - 6.2 % Final       Review of Systems   Constitution: Negative for chills and fever.   Cardiovascular: Negative for chest pain.   Respiratory: Negative for shortness of breath.    Gastrointestinal: Negative for nausea and vomiting.           Objective:      Physical Exam   Constitutional: He is oriented to person, place, and time. He appears well-developed and well-nourished. No distress.   Cardiovascular:   Pulses:       Dorsalis pedis pulses are 1+ on the left side.         Posterior tibial pulses are 0 on the left side.   Capillary fill time 3-5 seconds left stump   Musculoskeletal:   Lower extremities:    Deformities: right bka, left tma    Normal arch height and rectus left.    4/5 muscle strength and tone in all four quadrants left.     Pain-free range of motion in all four quadrants with stiffness and limitation left.     Pain: None     Neurological: He is alert and oriented to person, place, and time. He displays no Babinski's sign on the left side.   Plantar protective threshold sensation by touch via 5.07 SWMF absent left tma   Skin:   Lower extremities:    Thin, dry, atrophic left.  Distal cool with proximal foot warmth left.    Wound: 1.1x0.5x0.5cm left distal 2nd met stump probing to bone with granular base.  Drainage: Serosanguinous  Malodor: None  Erythema: Mild local periwound  Edema: Mild local periwound  Varicosities: None  Pigmentary changes: None  Interdigital maceration: None  Skin lesions: None       Psychiatric: He has a normal mood and affect.   Nursing note and vitals reviewed.              Assessment:       Encounter Diagnoses   Name Primary?    Ulcer of foot with fat layer exposed, unspecified laterality - Left Foot Yes    Peripheral vascular disease     Amputated toe of left foot     S/P unilateral BKA (below knee amputation) - Right Foot          Plan:       Kodi was seen today for wound check.    Diagnoses and all orders for this visit:    Ulcer of foot with fat layer exposed, unspecified laterality - Left Foot  -     Aerobic culture (Specify Source)  -     Culture, Anaerobic    Peripheral vascular disease  -     Aerobic culture (Specify Source)  -     Culture, Anaerobic    Amputated toe of left foot  -     Aerobic culture (Specify Source)  -     Culture, Anaerobic    S/P unilateral BKA (below knee amputation) - Right Foot      I counseled the patient on his conditions, their implications and medical management.    Ulcer wound was cleaned with  alcohol wipes and sharply excisionally debrided of viable and nonviable tissue including dermal tissue, subcutaneous tissue and good bleeding granular tissue utilizing sterile #15 blade, tissue nipper and  forceps. Swab was obtained from base and submitted for C&S. Wound was irrigated with sterile saline and bleeding was controlled with direct pressure. The patient tolerated this well.  Wound was then dressed with povidone-iodine ointment, xeroform, 4x4, seda and placed in modified post operative shoe. Patient was given instructions on daily dressing changes. Patient was also instructed on the importance of keeping the wound and dressings clean dry and intact and keeping pressure off the wound area until complete healing of the wound.  Antibiotics to be adjusted according to culture results.

## 2018-04-16 ENCOUNTER — PES CALL (OUTPATIENT)
Dept: ADMINISTRATIVE | Facility: CLINIC | Age: 68
End: 2018-04-16

## 2018-04-16 LAB — BACTERIA SPEC AEROBE CULT: NORMAL

## 2018-04-17 LAB — BACTERIA SPEC ANAEROBE CULT: NORMAL

## 2018-04-19 ENCOUNTER — PATIENT OUTREACH (OUTPATIENT)
Dept: ADMINISTRATIVE | Facility: HOSPITAL | Age: 68
End: 2018-04-19

## 2018-04-19 ENCOUNTER — OFFICE VISIT (OUTPATIENT)
Dept: PODIATRY | Facility: CLINIC | Age: 68
End: 2018-04-19
Payer: MEDICARE

## 2018-04-19 VITALS
SYSTOLIC BLOOD PRESSURE: 127 MMHG | DIASTOLIC BLOOD PRESSURE: 68 MMHG | HEART RATE: 70 BPM | BODY MASS INDEX: 26.67 KG/M2 | HEIGHT: 73 IN | WEIGHT: 201.25 LBS

## 2018-04-19 DIAGNOSIS — S98.132A AMPUTATED TOE OF LEFT FOOT: ICD-10-CM

## 2018-04-19 DIAGNOSIS — I73.9 PERIPHERAL VASCULAR DISEASE: ICD-10-CM

## 2018-04-19 DIAGNOSIS — L97.502 ULCER OF FOOT WITH FAT LAYER EXPOSED, UNSPECIFIED LATERALITY: Primary | ICD-10-CM

## 2018-04-19 PROCEDURE — 99999 PR PBB SHADOW E&M-EST. PATIENT-LVL III: CPT | Mod: PBBFAC,,, | Performed by: PODIATRIST

## 2018-04-19 PROCEDURE — 99499 UNLISTED E&M SERVICE: CPT | Mod: S$GLB,,, | Performed by: PODIATRIST

## 2018-04-19 RX ORDER — AMOXICILLIN AND CLAVULANATE POTASSIUM 875; 125 MG/1; MG/1
1 TABLET, FILM COATED ORAL EVERY 12 HOURS
Qty: 20 TABLET | Refills: 0 | Status: SHIPPED | OUTPATIENT
Start: 2018-04-19 | End: 2018-04-19 | Stop reason: SDUPTHER

## 2018-04-19 RX ORDER — AMOXICILLIN AND CLAVULANATE POTASSIUM 875; 125 MG/1; MG/1
1 TABLET, FILM COATED ORAL EVERY 12 HOURS
Qty: 20 TABLET | Refills: 0 | Status: SHIPPED | OUTPATIENT
Start: 2018-04-19 | End: 2018-04-29

## 2018-04-19 NOTE — PROGRESS NOTES
Office Visit 4/19/2018  Last encounter in this department: 4/12/2018    Subjective:      Patient ID: Kodi Ribeiro is a 68 y.o. male.    Chief Complaint: Follow-up (PCP  03/19/18, 1 week wound check on left foot, looks to be getting smaller. Pt states not other major issues or concerns. Pt is not a diabetic and is wearing tennsi shoes)    Kodi is a 68 y.o. male who presents to the clinic for evaluation and treatment of high risk feet. 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 The patient's chief complaint is foot ulcer, left tma stump. This patient has documented high risk feet requiring routine maintenance secondary to peripheral vascular disease.    PCP: Norma Nuñez MD    Date Last Seen by PCP: 3/19/18    Current shoe gear:  Affected Foot: N/A     Unaffected Foot: N/A    History of Trauma: negative  Sign of Infection: none    Hemoglobin A1C   Date Value Ref Range Status   06/23/2016 5.6 4.5 - 6.2 % Final   12/04/2012 5.9 4.0 - 6.2 % Final   06/27/2012 5.3 4.0 - 6.2 % Final       Review of Systems   Constitution: Negative for chills and fever.   Cardiovascular: Negative for chest pain.   Respiratory: Negative for shortness of breath.    Gastrointestinal: Negative for nausea and vomiting.           Objective:      Physical Exam   Constitutional: He is oriented to person, place, and time. He appears well-developed and well-nourished. No distress.   Cardiovascular:   Pulses:        Dorsalis pedis pulses are 1+ on the left side.        Posterior tibial pulses are 0 on the left side.   Capillary fill time 3-5 seconds left stump   Musculoskeletal:   Lower extremities:    Deformities: right bka, left tma    Normal arch height and rectus left.    4/5 muscle strength and tone in all four quadrants left.     Pain-free range of motion in all four quadrants with stiffness and limitation left.     Pain: None     Neurological: He is alert and oriented to person, place, and time. He displays no Babinski's sign on the left side.   Plantar protective threshold sensation by touch via 5.07 SWMF absent left tma   Skin:   Lower extremities:    Thin, dry, atrophic left.  Distal cool with proximal foot warmth left.    Wound: 1.0x0.5x0.5cm left distal 2nd met stump probing to bone with granular base.  Drainage: Serosanguinous  Malodor: None  Erythema: Mild local periwound  Edema: Mild local periwound  Varicosities: None  Pigmentary changes: None  Interdigital maceration: None  Skin lesions: None       Psychiatric: He has a normal mood and affect.   Nursing note and vitals reviewed.            Aerobic culture (Specify Source) [[id number]] Collected: 04/12/18 1543   Order Status: Completed Specimen: Wound from Foot, Left Updated: 04/16/18 1354    Aerobic Bacterial Culture Skin kayla,  no predominant organism   Culture, Anaerobic [[id number]] Collected: 04/12/18 1543   Order Status: Completed Specimen: Wound from Foot, Left Updated: 04/17/18 1518    Anaerobic Culture --    ACTINOMYCES ODONTOLYTICUS   Rare          Assessment:       Encounter Diagnoses   Name Primary?    Ulcer of foot with fat layer exposed, unspecified laterality - Left Foot Yes    Peripheral vascular disease     Amputated toe of left foot          Plan:       Kodi was seen today for follow-up.    Diagnoses and all orders for this visit:    Ulcer of foot with fat layer exposed, unspecified laterality - Left Foot    Peripheral vascular  disease    Amputated toe of left foot    Other orders  -     amoxicillin-clavulanate 875-125mg (AUGMENTIN) 875-125 mg per tablet; Take 1 tablet by mouth every 12 (twelve) hours.      I counseled the patient on his conditions, their implications and medical management.  Culture results were reviewed with the patient in he was given a prescription for Augmentin.    Ulcer wound was cleaned with alcohol wipes and sharply excisionally debrided of viable and nonviable tissue including dermal tissue, subcutaneous tissue and good bleeding granular tissue utilizing sterile #15 blade, tissue nipper and  forceps. Wound was irrigated with sterile saline and bleeding was controlled with direct pressure. The patient tolerated this well. Wound was then dressed with povidone-iodine ointment, xeroform, 4x4, seda and coverall. Patient was given instructions on daily dressing changes. Patient was also instructed on the importance of keeping the wound and dressings clean dry and intact and keeping pressure off the wound area until complete healing of the wound.

## 2018-04-19 NOTE — PATIENT INSTRUCTIONS
Keep wound and dressings clean and dry. Dressing changes as directed. Avoid pressure to the wound area and unnecessary ambulation until complete healing. Call for any signs of worsening wound or infection.

## 2018-05-03 ENCOUNTER — OFFICE VISIT (OUTPATIENT)
Dept: PODIATRY | Facility: CLINIC | Age: 68
End: 2018-05-03
Payer: MEDICARE

## 2018-05-03 VITALS — HEIGHT: 73 IN | HEART RATE: 67 BPM | SYSTOLIC BLOOD PRESSURE: 151 MMHG | DIASTOLIC BLOOD PRESSURE: 69 MMHG

## 2018-05-03 DIAGNOSIS — L97.502 ULCER OF FOOT WITH FAT LAYER EXPOSED, UNSPECIFIED LATERALITY: Primary | ICD-10-CM

## 2018-05-03 DIAGNOSIS — I73.9 PERIPHERAL VASCULAR DISEASE: ICD-10-CM

## 2018-05-03 DIAGNOSIS — Z89.519 S/P UNILATERAL BKA (BELOW KNEE AMPUTATION): ICD-10-CM

## 2018-05-03 DIAGNOSIS — S98.132A AMPUTATED TOE OF LEFT FOOT: ICD-10-CM

## 2018-05-03 PROCEDURE — 99999 PR PBB SHADOW E&M-EST. PATIENT-LVL III: CPT | Mod: PBBFAC,,, | Performed by: PODIATRIST

## 2018-05-03 PROCEDURE — 11042 DBRDMT SUBQ TIS 1ST 20SQCM/<: CPT | Mod: S$GLB,,, | Performed by: PODIATRIST

## 2018-05-03 PROCEDURE — 99499 UNLISTED E&M SERVICE: CPT | Mod: S$GLB,,, | Performed by: PODIATRIST

## 2018-05-03 NOTE — PROGRESS NOTES
Office Visit 5/3/2018  Last encounter in this department: 4/19/2018    Subjective:      Patient ID: Kodi Ribeiro is a 68 y.o. male.    Chief Complaint: Wound Check (left foot ulcer, patient denied current pain/problems with his left foot, patient is accompanied by his wife)    Kodi is a 68 y.o. male who presents to the clinic for evaluation and treatment of high risk feet. 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 The patient's chief complaint is foot ulcer, left TMA stump. This patient has documented high risk feet requiring routine maintenance secondary to peripheral vascular disease.    PCP: Norma Nuñez MD    Date Last Seen by PCP: 3/19/18    Current shoe gear:  Affected Foot: N/A     Unaffected Foot: N/A    History of Trauma: negative  Sign of Infection: none    Hemoglobin A1C   Date Value Ref Range Status   06/23/2016 5.6 4.5 - 6.2 % Final   12/04/2012 5.9 4.0 - 6.2 % Final   06/27/2012 5.3 4.0 - 6.2 % Final       Review of Systems   Constitution: Negative for chills and fever.   Cardiovascular: Negative for chest pain.   Respiratory: Negative for shortness of breath.    Gastrointestinal: Negative for nausea and vomiting.           Objective:      Physical Exam   Constitutional: He is oriented to person, place, and time. He appears well-developed and well-nourished. No distress.   Cardiovascular:   Pulses:       Dorsalis pedis pulses are 1+ on the left side.        Posterior tibial  pulses are 0 on the left side.   Capillary fill time 3-5 seconds left stump   Musculoskeletal:   Lower extremities:    Deformities: right bka, left tma    Normal arch height and rectus left.    4/5 muscle strength and tone in all four quadrants left.     Pain-free range of motion in all four quadrants with stiffness and limitation left.     Pain: None     Neurological: He is alert and oriented to person, place, and time. He displays no Babinski's sign on the left side.   Plantar protective threshold sensation by touch via 5.07 SWMF absent left tma   Skin:   Lower extremities:    Thin, dry, atrophic left.  Distal cool with proximal foot warmth left.    Wound: 1.0x0.5x0.5cm left distal 2nd met stump probing to bone with granular base.  Drainage: Serosanguinous  Malodor: None  Erythema: Mild local periwound  Edema: Mild local periwound  Varicosities: None  Pigmentary changes: None  Interdigital maceration: None  Skin lesions: None       Psychiatric: He has a normal mood and affect.   Nursing note and vitals reviewed.            X-rays: not indicated    Assessment:       Encounter Diagnoses   Name Primary?    Ulcer of foot with fat layer exposed, unspecified laterality - Left Foot Yes    Peripheral vascular disease     Amputated toe of left foot     S/P unilateral BKA (below knee amputation) - Right Foot          Plan:       Kodi was seen today for wound check.    Diagnoses and all orders for this visit:    Ulcer of foot with fat layer exposed, unspecified laterality - Left Foot    Peripheral vascular disease    Amputated toe of left foot    S/P unilateral BKA (below knee amputation) - Right Foot      I counseled the patient on his conditions, their implications and medical management.    Ulcer wound was cleaned with alcohol wipes and sharply excisionally debrided of viable and nonviable tissue including dermal tissue, subcutaneous tissue and good bleeding granular tissue utilizing sterile #15 blade, tissue  nipper and  forceps. Wound was irrigated with sterile saline and bleeding was controlled with direct pressure. The patient tolerated this well. Wound was then dressed with povidone-iodine ointment, xeroform, 4x4, sdea and coverall. Patient was given instructions on daily dressing changes. Patient was also instructed on the importance of keeping the wound and dressings clean dry and intact and keeping pressure off the wound area until complete healing of the wound.

## 2018-05-10 DIAGNOSIS — I10 ESSENTIAL HYPERTENSION: Chronic | ICD-10-CM

## 2018-05-10 RX ORDER — AMLODIPINE BESYLATE 10 MG/1
TABLET ORAL
Qty: 90 TABLET | Refills: 3 | Status: SHIPPED | OUTPATIENT
Start: 2018-05-10 | End: 2019-05-13 | Stop reason: SDUPTHER

## 2018-05-10 RX ORDER — LOSARTAN POTASSIUM 100 MG/1
TABLET ORAL
Qty: 90 TABLET | Refills: 3 | Status: SHIPPED | OUTPATIENT
Start: 2018-05-10 | End: 2019-01-29

## 2018-05-17 ENCOUNTER — TELEPHONE (OUTPATIENT)
Dept: UROLOGY | Facility: CLINIC | Age: 68
End: 2018-05-17

## 2018-05-17 ENCOUNTER — HOSPITAL ENCOUNTER (OUTPATIENT)
Dept: RADIOLOGY | Facility: HOSPITAL | Age: 68
Discharge: HOME | End: 2018-05-17
Attending: UROLOGY
Payer: MEDICARE

## 2018-05-17 ENCOUNTER — OFFICE VISIT (OUTPATIENT)
Dept: UROLOGY | Facility: CLINIC | Age: 68
End: 2018-05-17
Payer: MEDICARE

## 2018-05-17 ENCOUNTER — OFFICE VISIT (OUTPATIENT)
Dept: PODIATRY | Facility: CLINIC | Age: 68
End: 2018-05-17
Payer: MEDICARE

## 2018-05-17 VITALS
HEIGHT: 73 IN | RESPIRATION RATE: 16 BRPM | DIASTOLIC BLOOD PRESSURE: 64 MMHG | SYSTOLIC BLOOD PRESSURE: 138 MMHG | HEART RATE: 70 BPM | BODY MASS INDEX: 26.56 KG/M2

## 2018-05-17 VITALS
DIASTOLIC BLOOD PRESSURE: 70 MMHG | SYSTOLIC BLOOD PRESSURE: 138 MMHG | WEIGHT: 201.25 LBS | HEIGHT: 73 IN | BODY MASS INDEX: 26.67 KG/M2

## 2018-05-17 DIAGNOSIS — I73.9 PERIPHERAL VASCULAR DISEASE: ICD-10-CM

## 2018-05-17 DIAGNOSIS — Z89.519 S/P UNILATERAL BKA (BELOW KNEE AMPUTATION): ICD-10-CM

## 2018-05-17 DIAGNOSIS — Z12.5 ENCOUNTER FOR SCREENING FOR MALIGNANT NEOPLASM OF PROSTATE: ICD-10-CM

## 2018-05-17 DIAGNOSIS — N13.5 URETERAL STRICTURE: Primary | ICD-10-CM

## 2018-05-17 DIAGNOSIS — L97.502 ULCER OF FOOT WITH FAT LAYER EXPOSED, UNSPECIFIED LATERALITY: Primary | ICD-10-CM

## 2018-05-17 DIAGNOSIS — S98.132A AMPUTATED TOE OF LEFT FOOT: ICD-10-CM

## 2018-05-17 PROCEDURE — 99999 PR PBB SHADOW E&M-EST. PATIENT-LVL III: CPT | Mod: PBBFAC,,, | Performed by: PODIATRIST

## 2018-05-17 PROCEDURE — 99999 PR PBB SHADOW E&M-EST. PATIENT-LVL III: CPT | Mod: PBBFAC,,, | Performed by: UROLOGY

## 2018-05-17 PROCEDURE — 71046 X-RAY EXAM CHEST 2 VIEWS: CPT | Mod: 26,,, | Performed by: RADIOLOGY

## 2018-05-17 PROCEDURE — 3078F DIAST BP <80 MM HG: CPT | Mod: CPTII,S$GLB,, | Performed by: UROLOGY

## 2018-05-17 PROCEDURE — 3075F SYST BP GE 130 - 139MM HG: CPT | Mod: CPTII,S$GLB,, | Performed by: UROLOGY

## 2018-05-17 PROCEDURE — 99499 UNLISTED E&M SERVICE: CPT | Mod: S$GLB,,, | Performed by: PODIATRIST

## 2018-05-17 PROCEDURE — 11042 DBRDMT SUBQ TIS 1ST 20SQCM/<: CPT | Mod: S$GLB,,, | Performed by: PODIATRIST

## 2018-05-17 PROCEDURE — 71046 X-RAY EXAM CHEST 2 VIEWS: CPT | Mod: TC

## 2018-05-17 PROCEDURE — 87186 SC STD MICRODIL/AGAR DIL: CPT

## 2018-05-17 PROCEDURE — 99214 OFFICE O/P EST MOD 30 MIN: CPT | Mod: 57,S$GLB,, | Performed by: UROLOGY

## 2018-05-17 PROCEDURE — 99499 UNLISTED E&M SERVICE: CPT | Mod: S$PBB,,, | Performed by: UROLOGY

## 2018-05-17 PROCEDURE — 87077 CULTURE AEROBIC IDENTIFY: CPT

## 2018-05-17 PROCEDURE — 87075 CULTR BACTERIA EXCEPT BLOOD: CPT

## 2018-05-17 PROCEDURE — 87070 CULTURE OTHR SPECIMN AEROBIC: CPT

## 2018-05-17 PROCEDURE — 93000 ELECTROCARDIOGRAM COMPLETE: CPT | Mod: S$GLB,,, | Performed by: INTERNAL MEDICINE

## 2018-05-17 NOTE — PROGRESS NOTES
Office Visit 5/17/2018  Last encounter in this department: 5/3/2018    Subjective:      Patient ID: Kodi Ribeiro is a 68 y.o. male.    Chief Complaint: Wound Check (weekly wound care, no c/o pain, wears sock, non-diabetic, last visit with Dr. Nuñez on 3/19/18)    Kodi is a 68 y.o. male who presents to the clinic for evaluation and treatment of high risk feet. 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 The patient's chief complaint is foot ulcer, left tma stump. This patient has documented high risk feet requiring routine maintenance secondary to peripheral vascular disease.    PCP: Norma Nuñez MD    Date Last Seen by PCP: 3/19/18    Current shoe gear:  Affected Foot: N/A     Unaffected Foot: N/A    History of Trauma: negative  Sign of Infection: none    Hemoglobin A1C   Date Value Ref Range Status   06/23/2016 5.6 4.5 - 6.2 % Final   12/04/2012 5.9 4.0 - 6.2 % Final   06/27/2012 5.3 4.0 - 6.2 % Final       Review of Systems   Constitution: Negative for chills and fever.   Cardiovascular: Negative for chest pain.   Respiratory: Negative for shortness of breath.    Gastrointestinal: Negative for nausea and vomiting.           Objective:      Physical Exam   Constitutional: He is oriented to person, place, and time. He appears well-developed and well-nourished. No distress.   Cardiovascular:   Pulses:       Dorsalis pedis pulses are 1+ on the left side.        Posterior tibial pulses are 0  on the left side.   Capillary fill time 3-5 seconds left stump   Musculoskeletal:   Lower extremities:    Deformities: right bka, left tma    Normal arch height and rectus left.    4/5 muscle strength and tone in all four quadrants left.     Pain-free range of motion in all four quadrants with stiffness and limitation left.     Pain: None     Neurological: He is alert and oriented to person, place, and time. He displays no Babinski's sign on the left side.   Plantar protective threshold sensation by touch via 5.07 SWMF absent left tma   Skin:   Lower extremities:    Thin, dry, atrophic left.  Distal cool with proximal foot warmth left.    Wound: 1.0x0.5x0.5cm left distal 2nd met stump probing to bone with granular base.  Drainage: Serosanguinous  Malodor: None  Erythema: Mild local periwound  Edema: Mild local periwound  Varicosities: None  Pigmentary changes: None  Interdigital maceration: None  Skin lesions: None       Psychiatric: He has a normal mood and affect.   Nursing note and vitals reviewed.                Assessment:       Encounter Diagnoses   Name Primary?    Ulcer of foot with fat layer exposed, unspecified laterality - Left Foot Yes    Peripheral vascular disease     Amputated toe of left foot     S/P unilateral BKA (below knee amputation) - Right Foot          Plan:       Kodi was seen today for wound check.    Diagnoses and all orders for this visit:    Ulcer of foot with fat layer exposed, unspecified laterality - Left Foot    Peripheral vascular disease    Amputated toe of left foot    S/P unilateral BKA (below knee amputation) - Right Foot      I counseled the patient on his conditions, their implications and medical management.    Ulcer wound was cleaned with alcohol wipes and healing has stagnated in no debridement was needed at this time.  Given that it has been about 1 month since last culture we will go ahead and repeat culture.  In addition we discuss alternative adjunctive therapy  given nonhealing despite nonweightbearing.  Patient has had hyperbaric therapy and wound graft to foot ulcers in the past which eventually resulted in BKA on the right.  Patient agreed to referral to the wound care center for further workup vascular consultation and authorization for hyperbaric oxygen therapy which was submitted to [facility] wound care center. Wound was irrigated with sterile saline and bleeding was controlled with direct pressure. The patient tolerated this well. Wound was then off loaded with foam padding, dressed with bensal ointment and coverall. Patient was given instructions on daily dressing changes. Patient was also instructed on the importance of keeping the wound and dressings clean dry and intact and keeping pressure off the wound area until complete healing of the wound.  He will follow-up in 1 month to monitor progress and will call if any problems arise.

## 2018-05-17 NOTE — PROGRESS NOTES
Chief Complaint: Left ureteral stricture with stent    HPI:   5/17/18: Time to change left stent, will arrange.  No acute changes.  4/24/17: Due for left stent change, will arrange.  4/22/16: No changes in last year feeling very well.  Here to replace left stent.  Says he is doing great.  4/22/15: Doing fine with stent change. Stent had no problems after a year; RTC 3/1/16 to plan for next change.   3/19/15: Pt had his stent replaced 1/2014 but has not been back since.  He had major surgery for bowel abscess in the months after he saw me with a lot of vascular reconstruction.  Here to discuss replacement of his left ureteral stent.  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  1/17/14: Pt returns for f/u.  Recent CT shows stent still in place with no urolithiasis.  He has had a fem-fem bypass and his legs are working much better; he felt so good.    10/2012: Pt was found to have left ureteral stricture secondary to AAA repair. A stent was placed in May and then replaced by me last week. He has a long stricture of the middle ureter, such that a reimplant would require a psoas hitch/boari flap reaching to the upper ureter. This is complicated by the patient having a horseshoe kidney so nephropexy with proximal mobilization would be impractical. He is tolerating the stent well and is practically unaware of its presence.  >>> he was to have Mag3 and  f/u from this appt in 5 mo for stent replacement but did not return, Mag3 showed improved drainage with stent..... <<<  9/2012: About 3 months ago pt had severe left kidney infection and had an MI during the same period. He was diagnosed as having a left ureteral obstruction at the level of the iliac crest. A left ureteral stent was placed. He had a Mag3 study prior to stent placement that showed normal uptake bilaterally, normal right and delayed left excretion (44 min). Recent MI caused some damage but no further stenting or CABG indicated now. No history of kidney  stones. Had a full body scan in 2008 that showed left hydroureter and had a stent placed at that time and it was removed 8 months later with no plan for followup. Current stent has been in place for two months. No LUTS. Pain 0/10. Had an episode of gross hematuria one time last week.    Allergies:  Morphine    Medications:  has a current medication list which includes the following prescription(s): amlodipine, carvedilol, clopidogrel, docusate sodium, losartan, omeprazole, oxycodone-acetaminophen, pravastatin, and triamcinolone acetonide 0.025%.    Review of Systems:  General: No fever, chills, fatigability, or weight loss.  Skin: No rashes, itching, or changes in color or texture of skin.  Chest: Denies MACKEY, cyanosis, wheezing, cough, and sputum production.  Abdomen: Appetite fine. No weight loss. Denies diarrhea, abdominal pain, hematemesis, or blood in stool.  Musculoskeletal: No joint stiffness or swelling. Denies back pain.  : As above.  All other review of systems negative.    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    PSH:   has a past surgical history that includes Aorta - bilateral femoral artery bypass graft (2014); Coronary angioplasty with stent (2000); Abdominal aortic aneurysm repair; Ureteral stent placement (Left); right below knee amputation (2009 (approx)); Foot amputation through metatarsal (1996); Lung lobectomy (Right, 1970s); Abdominal aortic aneurysm repair (1996/2014);  Kidney surgery (2014); Foot surgery (Bilateral, 1980's); Amputation; Tonsillectomy (1955 aprox); and Small intestine surgery (2014).    FamHx: family history includes COPD in his mother; Cancer in his mother; Diabetes in his daughter; Heart disease in his father.    SocHx:  reports that he quit smoking about 9 years ago. He has a 15.00 pack-year smoking history. He has never used smokeless tobacco. He reports that he does not drink alcohol or use drugs.      Physical Exam:  Vitals:    05/17/18 1334   BP: 138/70     General: A&Ox3, no apparent distress, no deformities  Neck: No masses, normal thyroid  Abdomen: Soft, NT, ND  Skin: The skin is warm and dry. No jaundice.  Ext: No c/c/e.  : deferred to cysto    Labs/Studies:   PSA    11/13: 0.5    12/14: 0.4  Urinalysis performed in clinic, summary: UA normal    Impression/Plan:   1. Doing well with stent and stent did well for over a year; will replace on one year intervals.  Will schedule soon.  Renal US prior.  2. PSA with preop labs

## 2018-05-17 NOTE — TELEPHONE ENCOUNTER
Notified patient to discontinue Plavix 7 days prior to sx and resume medication 2 days after sx. Patient states understanding.

## 2018-05-17 NOTE — TELEPHONE ENCOUNTER
Good evening,  This is a mutual patient that Dr. Jin has scheduled for a cystoscopy with stent placement on 5/29/18. Please provide instructions regarding holding/resuming Plavix 75mg.   Thank you!  CORBIN Galindo

## 2018-05-17 NOTE — TELEPHONE ENCOUNTER
Plavix needs to be stopped at least 7 days before the proposed procedure and resume 2 days after the procedure provided there is no active bleeding.

## 2018-05-21 LAB — BACTERIA SPEC AEROBE CULT: NORMAL

## 2018-05-22 LAB — BACTERIA SPEC ANAEROBE CULT: NORMAL

## 2018-05-23 RX ORDER — SULFAMETHOXAZOLE AND TRIMETHOPRIM 400; 80 MG/1; MG/1
1 TABLET ORAL 2 TIMES DAILY
Qty: 20 TABLET | Refills: 0 | Status: SHIPPED | OUTPATIENT
Start: 2018-05-23 | End: 2018-06-02

## 2018-05-25 NOTE — PRE-PROCEDURE INSTRUCTIONS
Pre op instructions reviewed with patient's wife per phone:    To confirm, Your surgeon has instructed you:  Surgery is scheduled 5/29/18 at [facility].      Please report to [facility] 1st floor main lobby by MD.   Pre admit office to call afternoon prior to surgery with final arrival time      INSTRUCTIONS IMPORTANT!!!  ¨ Do not eat, drink, or smoke after 12 midnight-including water. OK to brush teeth, no gum, candy or mints!    ¨ Take only these medicines with a small swallow of water-morning of surgery.  Coreg, Amlodipine, Losartan, Prilosec    ____  Do not wear makeup, including mascara.  ____  No powder, lotions or creams to surgical area.  ____  Please remove all jewelry, including piercings and leave at home.  ____  No money or valuables needed. Please leave at home.  ____  Please bring identification and insurance information to hospital.  ____  If going home the same day, arrange for a ride home. You will not be able to   drive if Anesthesia was used.  ____  Children, under 12 years old, must remain in the waiting room with an adult.  They are not allowed in patient areas.  ____  Wear loose fitting clothing. Allow for dressings, bandages.  ____  Stop Aspirin, Ibuprofen, Motrin and Aleve at least 5-7 days before surgery, unless otherwise instructed by your doctor, or the nurse.   You MAY use Tylenol/acetaminophen until day of surgery.  ____  If you take diabetic medication, do not take am of surgery unless instructed by   Doctor.  ____ Stop taking any Fish Oil supplement or any Vitamins that contain Vitamin E at least 5 days prior to surgery.          Bathing Instructions-- The night before surgery and the morning prior to coming to the hospital:   -Do not shave the surgical area.   -Shower and wash your hair and body as usual with anti-bacterial  soap and shampoo.   -Rinse your hair and body completely.   -Use one packet of hibiclens to wash the surgical site (using your hand) gently for  5 minutes.  Do not scrub you skin too hard.   -Do not use hibiclens on your head, face, or genitals.   -Do not wash with anti-bacterial soap after you use the hibiclens.   -Rinse your body thoroughly.   -Dry with clean, soft towel.  Do not use lotion, cream, deodorant, or powders on   the surgical site.    Use antibacterial soap in place of hibiclens if your surgery is on the head, face or genitals.         Surgical Site Infection    Prevention of surgical site infections:     -Keep incisions clean and dry.   -Do not soak/submerge incisions in water until completely healed.   -Do not apply lotions, powders, creams, or deodorants to site.   -Always make sure hands are cleaned with antibacterial soap/ alcohol-based   prior to touching the surgical site.  (This includes doctors, nurses, staff, and yourself.)    Signs and symptoms:   -Redness and pain around the area where you had surgery   -Drainage of cloudy fluid from your surgical wound   -Fever over 100.4  I have read or had read and explained to me, and understand the above information.

## 2018-05-28 ENCOUNTER — TELEPHONE (OUTPATIENT)
Dept: UROLOGY | Facility: CLINIC | Age: 68
End: 2018-05-28

## 2018-05-28 ENCOUNTER — ANESTHESIA EVENT (OUTPATIENT)
Dept: SURGERY | Facility: HOSPITAL | Age: 68
End: 2018-05-28
Payer: MEDICARE

## 2018-05-28 NOTE — ANESTHESIA PREPROCEDURE EVALUATION
05/28/2018  Kodi Ribeiro is a 68 y.o., male.    Anesthesia Evaluation    I have reviewed the Patient Summary Reports.    I have reviewed the Nursing Notes.   I have reviewed the Medications.     Review of Systems  Anesthesia Hx:  No problems with previous Anesthesia  Denies Family Hx of Anesthesia complications.   Denies Personal Hx of Anesthesia complications.   Social:  Former Smoker    Hematology/Oncology:         -- Anemia:   Cardiovascular:   Hypertension Past MI CAD   PVD (s/p aorto bifemoral bypass) hyperlipidemia H/o Significant left carotid artery stenosis 99% with bilateral intracranial ICA stenosis per vascular surgery- s/p endarterectomy 5/17    ECHO 8/16/17  CONCLUSIONS     1 - Severe left atrial enlargement.     2 - Concentric remodeling.     3 - No wall motion abnormalities.     4 - Normal left ventricular systolic function (EF 60-65%).     5 - Impaired LV relaxation, normal LAP (grade 1 diastolic dysfunction).     6 - Normal right ventricular systolic function .     7 - The estimated PA systolic pressure is greater than 21 mmHg.    Renal/:   Chronic Renal Disease (horseshoe kidney), CRI    Hepatic/GI:   GERD Admitted this admission with small bowel obstruction and coffee ground emesis   Musculoskeletal:   Arthritis  S/p BKA       Physical Exam  General:  Well nourished    Airway/Jaw/Neck:  Airway Findings: Mallampati: I     Dental:  Dental Findings: Upper Dentures   Chest/Lungs:  Chest/Lungs Findings: Clear to auscultation, Normal Respiratory Rate     Heart/Vascular:  Heart Findings: Rate: Normal        Mental Status:  Mental Status Findings:  Cooperative, Alert and Oriented         Anesthesia Plan  Type of Anesthesia, risks & benefits discussed:  Anesthesia Type:  general  Patient's Preference:   Intra-op Monitoring Plan: standard ASA monitors  Intra-op Monitoring Plan Comments:   Post  Op Pain Control Plan: multimodal analgesia and per primary service following discharge from PACU  Post Op Pain Control Plan Comments:   Induction:   IV  Beta Blocker:  Patient is on a Beta-Blocker and has received one dose within the past 24 hours (No further documentation required).       Informed Consent: Patient understands risks and agrees with Anesthesia plan.  Questions answered. Anesthesia consent signed with patient.  ASA Score: 4     Day of Surgery Review of History & Physical: I have interviewed and examined the patient. I have reviewed the patient's H&P dated: 5/17/18. There are no significant changes.          Ready For Surgery From Anesthesia Perspective.

## 2018-05-29 ENCOUNTER — TELEPHONE (OUTPATIENT)
Dept: UROLOGY | Facility: CLINIC | Age: 68
End: 2018-05-29

## 2018-05-29 ENCOUNTER — SURGERY (OUTPATIENT)
Age: 68
End: 2018-05-29

## 2018-05-29 ENCOUNTER — HOSPITAL ENCOUNTER (OUTPATIENT)
Facility: HOSPITAL | Age: 68
Discharge: HOME | End: 2018-05-29
Attending: UROLOGY | Admitting: UROLOGY
Payer: MEDICARE

## 2018-05-29 ENCOUNTER — ANESTHESIA (OUTPATIENT)
Dept: SURGERY | Facility: HOSPITAL | Age: 68
End: 2018-05-29
Payer: MEDICARE

## 2018-05-29 DIAGNOSIS — N13.5 URETERAL STRICTURE: ICD-10-CM

## 2018-05-29 PROCEDURE — 36000706: Performed by: UROLOGY

## 2018-05-29 PROCEDURE — 25000003 PHARM REV CODE 250: Performed by: NURSE ANESTHETIST, CERTIFIED REGISTERED

## 2018-05-29 PROCEDURE — C1769 GUIDE WIRE: HCPCS | Performed by: UROLOGY

## 2018-05-29 PROCEDURE — 36000707: Performed by: UROLOGY

## 2018-05-29 PROCEDURE — 37000009 HC ANESTHESIA EA ADD 15 MINS: Performed by: UROLOGY

## 2018-05-29 PROCEDURE — 63600175 PHARM REV CODE 636 W HCPCS: Performed by: ANESTHESIOLOGY

## 2018-05-29 PROCEDURE — 37000008 HC ANESTHESIA 1ST 15 MINUTES: Performed by: UROLOGY

## 2018-05-29 PROCEDURE — 71000033 HC RECOVERY, INTIAL HOUR: Performed by: UROLOGY

## 2018-05-29 PROCEDURE — 25500020 PHARM REV CODE 255: Performed by: UROLOGY

## 2018-05-29 PROCEDURE — 63600175 PHARM REV CODE 636 W HCPCS: Performed by: UROLOGY

## 2018-05-29 PROCEDURE — 74420 UROGRAPHY RTRGR +-KUB: CPT | Mod: 26,,, | Performed by: UROLOGY

## 2018-05-29 PROCEDURE — 76000 FLUOROSCOPY <1 HR PHYS/QHP: CPT | Mod: 26,59,, | Performed by: UROLOGY

## 2018-05-29 PROCEDURE — 71000015 HC POSTOP RECOV 1ST HR: Performed by: UROLOGY

## 2018-05-29 PROCEDURE — 52332 CYSTOSCOPY AND TREATMENT: CPT | Mod: LT,,, | Performed by: UROLOGY

## 2018-05-29 PROCEDURE — C2617 STENT, NON-COR, TEM W/O DEL: HCPCS | Performed by: UROLOGY

## 2018-05-29 PROCEDURE — 25000003 PHARM REV CODE 250: Performed by: ANESTHESIOLOGY

## 2018-05-29 PROCEDURE — 63600175 PHARM REV CODE 636 W HCPCS: Performed by: NURSE ANESTHETIST, CERTIFIED REGISTERED

## 2018-05-29 PROCEDURE — C1758 CATHETER, URETERAL: HCPCS | Performed by: UROLOGY

## 2018-05-29 DEVICE — STENT URETERAL UNIV 7FR 26CM
Type: IMPLANTABLE DEVICE | Site: URETER | Status: NON-FUNCTIONAL
Removed: 2020-02-04

## 2018-05-29 RX ORDER — HYDROCODONE BITARTRATE AND ACETAMINOPHEN 5; 325 MG/1; MG/1
1 TABLET ORAL EVERY 4 HOURS PRN
Status: DISCONTINUED | OUTPATIENT
Start: 2018-05-29 | End: 2018-05-29 | Stop reason: HOSPADM

## 2018-05-29 RX ORDER — CEFAZOLIN SODIUM 1 G/50ML
2 SOLUTION INTRAVENOUS
Status: DISCONTINUED | OUTPATIENT
Start: 2018-05-29 | End: 2018-05-29 | Stop reason: HOSPADM

## 2018-05-29 RX ORDER — MEPERIDINE HYDROCHLORIDE 50 MG/ML
12.5 INJECTION INTRAMUSCULAR; INTRAVENOUS; SUBCUTANEOUS ONCE AS NEEDED
Status: DISCONTINUED | OUTPATIENT
Start: 2018-05-29 | End: 2018-05-29 | Stop reason: HOSPADM

## 2018-05-29 RX ORDER — ONDANSETRON 2 MG/ML
4 INJECTION INTRAMUSCULAR; INTRAVENOUS DAILY PRN
Status: DISCONTINUED | OUTPATIENT
Start: 2018-05-29 | End: 2018-05-29 | Stop reason: HOSPADM

## 2018-05-29 RX ORDER — OXYCODONE HYDROCHLORIDE 5 MG/1
5 TABLET ORAL ONCE AS NEEDED
Status: DISCONTINUED | OUTPATIENT
Start: 2018-05-29 | End: 2018-05-29 | Stop reason: HOSPADM

## 2018-05-29 RX ORDER — SODIUM CHLORIDE, SODIUM LACTATE, POTASSIUM CHLORIDE, CALCIUM CHLORIDE 600; 310; 30; 20 MG/100ML; MG/100ML; MG/100ML; MG/100ML
INJECTION, SOLUTION INTRAVENOUS CONTINUOUS
Status: DISCONTINUED | OUTPATIENT
Start: 2018-05-29 | End: 2018-05-29 | Stop reason: HOSPADM

## 2018-05-29 RX ORDER — ROCURONIUM BROMIDE 10 MG/ML
INJECTION, SOLUTION INTRAVENOUS
Status: DISCONTINUED | OUTPATIENT
Start: 2018-05-29 | End: 2018-05-29

## 2018-05-29 RX ORDER — LIDOCAINE HCL/PF 100 MG/5ML
SYRINGE (ML) INTRAVENOUS
Status: DISCONTINUED | OUTPATIENT
Start: 2018-05-29 | End: 2018-05-29

## 2018-05-29 RX ORDER — FENTANYL CITRATE 50 UG/ML
25 INJECTION, SOLUTION INTRAMUSCULAR; INTRAVENOUS EVERY 5 MIN PRN
Status: DISCONTINUED | OUTPATIENT
Start: 2018-05-29 | End: 2018-05-29 | Stop reason: HOSPADM

## 2018-05-29 RX ORDER — LIDOCAINE HYDROCHLORIDE 10 MG/ML
1 INJECTION, SOLUTION EPIDURAL; INFILTRATION; INTRACAUDAL; PERINEURAL ONCE
Status: DISCONTINUED | OUTPATIENT
Start: 2018-05-29 | End: 2018-05-29 | Stop reason: HOSPADM

## 2018-05-29 RX ORDER — PROPOFOL 10 MG/ML
VIAL (ML) INTRAVENOUS
Status: DISCONTINUED | OUTPATIENT
Start: 2018-05-29 | End: 2018-05-29

## 2018-05-29 RX ORDER — OXYCODONE AND ACETAMINOPHEN 5; 325 MG/1; MG/1
1-2 TABLET ORAL EVERY 4 HOURS PRN
Qty: 30 TABLET | Refills: 0 | Status: SHIPPED | OUTPATIENT
Start: 2018-05-29 | End: 2019-03-25

## 2018-05-29 RX ORDER — MIDAZOLAM HYDROCHLORIDE 1 MG/ML
INJECTION, SOLUTION INTRAMUSCULAR; INTRAVENOUS
Status: DISCONTINUED | OUTPATIENT
Start: 2018-05-29 | End: 2018-05-29

## 2018-05-29 RX ORDER — FENTANYL CITRATE 50 UG/ML
INJECTION, SOLUTION INTRAMUSCULAR; INTRAVENOUS
Status: DISCONTINUED | OUTPATIENT
Start: 2018-05-29 | End: 2018-05-29

## 2018-05-29 RX ORDER — DOCUSATE SODIUM 100 MG/1
100 CAPSULE, LIQUID FILLED ORAL 2 TIMES DAILY
Qty: 60 CAPSULE | Refills: 0 | Status: SHIPPED | OUTPATIENT
Start: 2018-05-29 | End: 2018-12-07 | Stop reason: SDUPTHER

## 2018-05-29 RX ORDER — CIPROFLOXACIN 500 MG/1
500 TABLET ORAL EVERY 12 HOURS
Qty: 6 TABLET | Refills: 0 | Status: SHIPPED | OUTPATIENT
Start: 2018-05-29 | End: 2018-06-01

## 2018-05-29 RX ORDER — HYDROCODONE BITARTRATE AND ACETAMINOPHEN 10; 325 MG/1; MG/1
1 TABLET ORAL EVERY 4 HOURS PRN
Status: DISCONTINUED | OUTPATIENT
Start: 2018-05-29 | End: 2018-05-29 | Stop reason: HOSPADM

## 2018-05-29 RX ORDER — HYDROMORPHONE HYDROCHLORIDE 2 MG/ML
0.2 INJECTION, SOLUTION INTRAMUSCULAR; INTRAVENOUS; SUBCUTANEOUS EVERY 5 MIN PRN
Status: DISCONTINUED | OUTPATIENT
Start: 2018-05-29 | End: 2018-05-29 | Stop reason: HOSPADM

## 2018-05-29 RX ORDER — ACETAMINOPHEN 10 MG/ML
1000 INJECTION, SOLUTION INTRAVENOUS ONCE
Status: COMPLETED | OUTPATIENT
Start: 2018-05-29 | End: 2018-05-29

## 2018-05-29 RX ORDER — ONDANSETRON 2 MG/ML
INJECTION INTRAMUSCULAR; INTRAVENOUS
Status: DISCONTINUED | OUTPATIENT
Start: 2018-05-29 | End: 2018-05-29

## 2018-05-29 RX ADMIN — PROPOFOL 100 MG: 10 INJECTION, EMULSION INTRAVENOUS at 08:05

## 2018-05-29 RX ADMIN — FENTANYL CITRATE 100 MCG: 50 INJECTION, SOLUTION INTRAMUSCULAR; INTRAVENOUS at 08:05

## 2018-05-29 RX ADMIN — CEFAZOLIN SODIUM 2 G: 1 SOLUTION INTRAVENOUS at 08:05

## 2018-05-29 RX ADMIN — LIDOCAINE HYDROCHLORIDE 80 MG: 20 INJECTION, SOLUTION INTRAVENOUS at 08:05

## 2018-05-29 RX ADMIN — ROCURONIUM BROMIDE 10 MG: 10 INJECTION, SOLUTION INTRAVENOUS at 08:05

## 2018-05-29 RX ADMIN — MIDAZOLAM 2 MG: 1 INJECTION INTRAMUSCULAR; INTRAVENOUS at 08:05

## 2018-05-29 RX ADMIN — SODIUM CHLORIDE, SODIUM LACTATE, POTASSIUM CHLORIDE, AND CALCIUM CHLORIDE: 600; 310; 30; 20 INJECTION, SOLUTION INTRAVENOUS at 08:05

## 2018-05-29 RX ADMIN — ONDANSETRON 4 MG: 2 INJECTION, SOLUTION INTRAMUSCULAR; INTRAVENOUS at 08:05

## 2018-05-29 RX ADMIN — IOHEXOL 50 ML: 350 INJECTION, SOLUTION INTRAVENOUS at 08:05

## 2018-05-29 RX ADMIN — ACETAMINOPHEN 1000 MG: 10 INJECTION, SOLUTION INTRAVENOUS at 09:05

## 2018-05-29 NOTE — DISCHARGE SUMMARY
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    History of Present Illness: Pt was worked up in clinic and today's procedure was scheduled.  Please see H&P for full details.    Hospital Course: Pt presented on the day of surgery and after proper consents were obtained he was brought to the OR where his procedure was performed without difficulty.    Discharge Disposition: Home    Followup Plan:  1. Pt is provided with medications for pain and others as indicated.  2. RTC in 1 year

## 2018-05-29 NOTE — INTERVAL H&P NOTE
The patient has been examined and the H&P has been reviewed:    I concur with the findings and no changes have occurred since H&P was written.    Anesthesia/Surgery risks, benefits and alternative options discussed and understood by patient/family.          Active Hospital Problems    Diagnosis  POA    Ureteral stricture [N13.5]  Yes      Resolved Hospital Problems    Diagnosis Date Resolved POA   No resolved problems to display.

## 2018-05-29 NOTE — ANESTHESIA POSTPROCEDURE EVALUATION
"Anesthesia Post Evaluation    Patient: Kodi Ribeiro    Procedure(s) Performed: Procedure(s) (LRB):  CYSTOSCOPY WITH STENT PLACEMENT (Left)  CYSTOSCOPY, WITH RETROGRADE PYELOGRAM (Left)  CYSTOSCOPY, WITH URETERAL STENT REMOVAL (Left)    Final Anesthesia Type: general  Patient location during evaluation: PACU  Patient participation: Yes- Able to Participate  Level of consciousness: awake and alert  Post-procedure vital signs: reviewed and stable  Pain management: adequate  Airway patency: patent  PONV status at discharge: No PONV  Anesthetic complications: no      Cardiovascular status: hypotensive  Respiratory status: unassisted  Hydration status: euvolemic  Follow-up not needed.        Visit Vitals  /64 (BP Location: Left arm, Patient Position: Lying)   Pulse 69   Temp 36.8 °C (98.2 °F) (Axillary)   Resp 14   Ht 6' 1" (1.854 m)   Wt 93 kg (205 lb 0.4 oz)   SpO2 97%   BMI 27.05 kg/m²       Pain/Rommel Score: Pain Assessment Performed: Yes (5/29/2018  9:30 AM)  Presence of Pain: denies (5/29/2018  9:30 AM)  Pain Rating Prior to Med Admin: 0 (5/29/2018  9:20 AM)  Rommel Score: 9 (5/29/2018  9:30 AM)      "

## 2018-05-29 NOTE — OP NOTE
Date of Procedure: 05/29/2018    Pre-operative Diagnosis:   1.  Left ureteral obstruction    Post-operative Diagnosis:   1.  same    Surgeon: GISELE Jin MD    Assistants: None    Specimen: None    Prosthetics, Devices, Grafts: None    Anesthesia: General    Procedures:  1. Cysto with placement of left ureteral stent  2. Left retrograde pyelogram  3. Fluoro less than one hour    Procedure in Detail:  After proper consents were obtained, the patient was prepped and draped in normal sterile fashion in the dorsal lithotomy position.  The 22 Fr resectascope was assembled and introduced per urethra.  The bladder was inspected.  Wire was passed to the left renal pelvis and with graspers the previous stent was withdrawn.    A 5 Fr catheter was used to perform a left retrograde pyelogram at the proximal ureter with half-dilute water soluble contrast.  The kidney was hydronephrotic with dilated renal pelvis and calyces blunted; ureter was normal.  The catheter was withdrawn.    A guide wire was passed to the left renal pelvis and a new stent was then passed over the wire and when the wire was withdrawn fluoroscopy confirmed good placement with a curl in the stent on both ends.  The cystoscope was then reinserted to the bladder which was drained and the scope was then withdrawn and set aside.    Blood Loss: none    Fluids: Per anesthesia.    Drains: 7x26cm left ureteral stent    Complications: None.

## 2018-05-29 NOTE — TELEPHONE ENCOUNTER
----- Message from Scooter Jin IV, MD sent at 5/29/2018  8:51 AM CDT -----  1 year followup no sooner please

## 2018-05-29 NOTE — ANESTHESIA RELEASE NOTE
"Anesthesia Release from PACU Note    Patient: Kodi Ribeiro    Procedure(s) Performed: Procedure(s) (LRB):  CYSTOSCOPY WITH STENT PLACEMENT (Left)  CYSTOSCOPY, WITH RETROGRADE PYELOGRAM (Left)  CYSTOSCOPY, WITH URETERAL STENT REMOVAL (Left)    Anesthesia type: general    Post pain: Adequate analgesia    Post assessment: no apparent anesthetic complications    Last Vitals:   Visit Vitals  BP (!) 168/80 (BP Location: Right arm, Patient Position: Sitting)   Pulse 75   Temp 36.7 °C (98.1 °F) (Temporal)   Resp 18   Ht 6' 1" (1.854 m)   Wt 93 kg (205 lb 0.4 oz)   SpO2 95%   BMI 27.05 kg/m²       Post vital signs: stable    Level of consciousness: awake    Nausea/Vomiting: no nausea/no vomiting    Complications: none    Airway Patency: patent    Respiratory: unassisted    Cardiovascular: stable and blood pressure at baseline    Hydration: euvolemic  "

## 2018-05-29 NOTE — TRANSFER OF CARE
"Anesthesia Transfer of Care Note    Patient: Kodi Ribeiro    Procedure(s) Performed: Procedure(s) (LRB):  CYSTOSCOPY WITH STENT PLACEMENT (Left)  CYSTOSCOPY, WITH RETROGRADE PYELOGRAM (Left)  CYSTOSCOPY, WITH URETERAL STENT REMOVAL (Left)    Patient location: PACU    Anesthesia Type: general    Transport from OR: Transported from OR on room air with adequate spontaneous ventilation    Post pain: adequate analgesia    Post assessment: no apparent anesthetic complications    Post vital signs: stable    Level of consciousness: awake    Nausea/Vomiting: no nausea/vomiting    Complications: none    Transfer of care protocol was followed      Last vitals:   Visit Vitals  BP (!) 168/80 (BP Location: Right arm, Patient Position: Sitting)   Pulse 75   Temp 36.7 °C (98.1 °F) (Temporal)   Resp 18   Ht 6' 1" (1.854 m)   Wt 93 kg (205 lb 0.4 oz)   SpO2 95%   BMI 27.05 kg/m²     "

## 2018-05-29 NOTE — DISCHARGE INSTRUCTIONS
General Information:    1. Do not drink alcoholic beverages including beer for 24 hours or as long as you are on pain medication..  2. Do not drive a motor vehicle, operate machinery or power tools, or signs legal papers for 24 hours or as long as you are on pain medication.   3. You may experience light-headedness, dizziness, and sleepiness following surgery. Please do not stay alone. A responsible adult should be with you for this 24 hour period.  4. Go home and rest.  5. Progress slowly to a normal diet unless instructed.  Otherwise, begin with liquids such as soft drinks, then soup and crackers working up to solid foods. Drink plenty of nonalcoholic fluids.  6. Certain anesthetics and pain medications produce nausea and vomiting in certain individuals. If nausea becomes a problem at home, call you doctor.  7. A nurse will be calling you sometime after surgery. Do not be alarmed. This is our way of finding out how you are doing.  8. Several times every hour while you are awake, take 2-3 deep breaths and cough. If you had stomach surgery hold a pillow or rolled towel firmly against your stomach before you cough. This will help with any pain the cough might cause.  9. Several times every hour while you are awake, pump and flex your feet 5-6 times and do foot circles. This will help prevent blood clots.  10. Call your doctor for severe pain, bleeding, fever, or signs or symptoms of infection (pain, swelling, redness, foul odor, drainage).  11. You can contact your doctor anytime by [phone] for the [facility] (at [facility]) or [phone] for the [facility] on [facility].   [email or web address] is another way to contact your doctor if you are an active participant online with [facility].  12. Continue Nozin provided at discharge twice daily for 7 days or until the incision is healed.  See pamphlet or box for instructions.

## 2018-05-31 DIAGNOSIS — I10 ESSENTIAL HYPERTENSION: Chronic | ICD-10-CM

## 2018-05-31 RX ORDER — CARVEDILOL 6.25 MG/1
TABLET ORAL
Qty: 180 TABLET | Refills: 3 | Status: SHIPPED | OUTPATIENT
Start: 2018-05-31 | End: 2019-01-11

## 2018-06-04 VITALS
OXYGEN SATURATION: 96 % | TEMPERATURE: 98 F | RESPIRATION RATE: 16 BRPM | SYSTOLIC BLOOD PRESSURE: 124 MMHG | DIASTOLIC BLOOD PRESSURE: 60 MMHG | HEART RATE: 65 BPM | BODY MASS INDEX: 27.17 KG/M2 | HEIGHT: 73 IN | WEIGHT: 205 LBS

## 2018-07-09 NOTE — PROGRESS NOTES
Dr rodriguez notified pt c/o chest pressure mid epigastric through to back. States 8/10. Pt repositioned and hob elevated without relief. Pt has had mi in past and states not the same pain but afraid of having another heart attack. Pt also has hx of gerd. Dilaudid 0.2 mg ivp given as ordered. 12 ekg done and cardiac enzymes and troponin sent as ordered. Dr rodriguez reviewed all results. pepcid ivpb given as ordered followed by morgan. After pepcid pt reported chest pain and back pain resolved and after ofirmev pain 2-3/10 and pt able to sleep. No additional orders noted.   none

## 2018-08-02 ENCOUNTER — OFFICE VISIT (OUTPATIENT)
Dept: PODIATRY | Facility: CLINIC | Age: 68
End: 2018-08-02
Payer: MEDICARE

## 2018-08-02 VITALS
SYSTOLIC BLOOD PRESSURE: 146 MMHG | HEIGHT: 73 IN | HEART RATE: 70 BPM | RESPIRATION RATE: 16 BRPM | DIASTOLIC BLOOD PRESSURE: 72 MMHG

## 2018-08-02 DIAGNOSIS — G60.9 IDIOPATHIC PERIPHERAL NEUROPATHY: Chronic | ICD-10-CM

## 2018-08-02 DIAGNOSIS — L97.422 CHRONIC ULCER OF LEFT MIDFOOT WITH FAT LAYER EXPOSED: Primary | ICD-10-CM

## 2018-08-02 DIAGNOSIS — I73.9 PVD (PERIPHERAL VASCULAR DISEASE): Chronic | ICD-10-CM

## 2018-08-02 DIAGNOSIS — Z89.511 STATUS POST BELOW KNEE AMPUTATION OF RIGHT LOWER EXTREMITY: Chronic | ICD-10-CM

## 2018-08-02 DIAGNOSIS — Z89.432 HISTORY OF TRANSMETATARSAL AMPUTATION OF LEFT FOOT: ICD-10-CM

## 2018-08-02 DIAGNOSIS — N18.30 CKD (CHRONIC KIDNEY DISEASE) STAGE 3, GFR 30-59 ML/MIN: Chronic | ICD-10-CM

## 2018-08-02 PROCEDURE — 99999 PR PBB SHADOW E&M-EST. PATIENT-LVL III: CPT | Mod: PBBFAC,,, | Performed by: PODIATRIST

## 2018-08-02 PROCEDURE — 3077F SYST BP >= 140 MM HG: CPT | Mod: CPTII,S$GLB,, | Performed by: PODIATRIST

## 2018-08-02 PROCEDURE — 99213 OFFICE O/P EST LOW 20 MIN: CPT | Mod: 25,S$GLB,, | Performed by: PODIATRIST

## 2018-08-02 PROCEDURE — 99499 UNLISTED E&M SERVICE: CPT | Mod: HCNC,S$GLB,, | Performed by: PODIATRIST

## 2018-08-02 PROCEDURE — 11042 DBRDMT SUBQ TIS 1ST 20SQCM/<: CPT | Mod: S$GLB,,, | Performed by: PODIATRIST

## 2018-08-02 PROCEDURE — 3078F DIAST BP <80 MM HG: CPT | Mod: CPTII,S$GLB,, | Performed by: PODIATRIST

## 2018-08-02 NOTE — PROGRESS NOTES
Subjective:       Patient ID: Kodi Ribeiro is a 68 y.o. male.    Chief Complaint: Wound Check (Left foot wound check, rates pain 0/10, wears sock, assisted iwth wheel chair, non-diabetic Pt, last visit with DR. Nuñez on 3/19/18)      HPI: Patient presents to the clinic today, for follow up and treatment concerning an ulceration to the plantar distal aspect of the left transmetatarsal stump. Patient's Primary Care Provider is Norma Nuñez MD. The PMHx. does include idiopathic peripheral neuropathy and peripheral vascular disease.  This patient is not a diabetic. To this juncture, the wound has been present for several months, approximately 4-6. Local woundcare product(s) most recently is/are topical antibiotic ointment.  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  He presents this afternoon with his wife.  He is ambulatory with a wheelchair mostly.  Denies local or systemic signs infection at this time.  They each state that they have been multiple bone biopsies of the left foot to rule out osteomyelitis, the bone biopsy been negative thus far.  Patient does state a tendo-Achilles lengthening on the left, several years ago during the same time that his right foot was amputated.  He also does have prior wound culture from May 17, 2018.    Hemoglobin A1C   Date Value Ref Range Status   06/23/2016 5.6 4.5 - 6.2 % Final   12/04/2012 5.9 4.0 - 6.2 % Final   06/27/2012 5.3 4.0 - 6.2 % Final       Review of patient's allergies indicates:   Allergen Reactions    Morphine Itching       Past Medical History:   Diagnosis Date    Anemia     Arthritis     Colon polyp     Repeat colonoscopy due in 9/14    Coronary artery disease     Diverticulosis     colonoscopy 2/21/2014    Encounter for blood transfusion     GERD (gastroesophageal reflux disease)     Hemorrhoids     colonoscopy 2/21/2014    Horseshoe kidney     Hyperglycemia 3/17/2014     Hyperlipidemia     Hypertension     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    Late complications of amputation stump     rseolved with further amputation( MRSA then none since 2014)    Lipoma of colon     colonoscopy 2/21/2014    Myocardial infarction     per patient 2000 & 9/2012    Peripheral vascular disease     Phantom limb syndrome     patient reports only intermittent not problematic, not worsening    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    Spinal cord disease     L4L5 disc    Stroke     Tobacco dependence     resolved    Ureteral stent retained        Family History   Problem Relation Age of Onset    Cancer Mother         lung    COPD Mother     Heart disease Father         MI but per patient bc of old age    Diabetes Daughter     Eczema Neg Hx     Lupus Neg Hx     Psoriasis Neg Hx     Melanoma Neg Hx     Kidney disease Neg Hx     Stroke Neg Hx     Mental illness Neg Hx     Mental retardation Neg Hx     Hypertension Neg Hx     Hyperlipidemia Neg Hx     Drug abuse Neg Hx     Alcohol abuse Neg Hx     Depression Neg Hx        Social History     Social History    Marital status:      Spouse name: Laury    Number of children: 2    Years of education: N/A     Occupational History    Retired       [employer]     Social History Main Topics    Smoking status: Former Smoker     Packs/day: 1.00     Years: 15.00     Quit date: 1/1/2009    Smokeless tobacco: Never Used    Alcohol use No    Drug use: No      Comment: Is on prescription opiod, no non prescribed use    Sexual activity: Not Currently     Partners: Female     Other Topics Concern    Not on file     Social History Narrative     . 4 children alive and well. 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  Still drives. Does not have a Living Will.       Past Surgical History:   Procedure Laterality Date    ABDOMINAL AORTIC ANEURYSM REPAIR       ABDOMINAL AORTIC ANEURYSM REPAIR  1996/2014    AMPUTATION, LOWER LIMB      AORTA - BILATERAL FEMORAL ARTERY BYPASS GRAFT  2014    Left and right leg    CORONARY ANGIOPLASTY WITH STENT PLACEMENT  2000    Three placed in heart    CYSTOSCOPY W/ RETROGRADES Left 5/29/2018    Procedure: CYSTOSCOPY, WITH RETROGRADE PYELOGRAM;  Surgeon: Scooter Jin IV, MD;  Location: [facility];  Service: Urology;  Laterality: Left;    CYSTOSCOPY W/ URETERAL STENT PLACEMENT Left 5/29/2018    Procedure: CYSTOSCOPY, WITH URETERAL STENT INSERTION;  Surgeon: Scooter Jin IV, MD;  Location: [facility] OR;  Service: Urology;  Laterality: Left;    CYSTOSCOPY W/ URETERAL STENT REMOVAL Left 5/29/2018    Procedure: CYSTOSCOPY, WITH URETERAL STENT REMOVAL;  Surgeon: Scooter Jin IV, MD;  Location: [facility] OR;  Service: Urology;  Laterality: Left;    FOOT AMPUTATION THROUGH METATARSAL  1996    left    FOOT SURGERY Bilateral 1980's    per patient multiple toe amputations prior to.  partial foot amputation:first great toe then other toes     KIDNEY SURGERY  2014    per patient separation of horseshoe kidney @ time of AAA repair    LUNG LOBECTOMY Right 1970s    per patient not cancer    right below knee amputation  2009 (approx)    SMALL INTESTINE SURGERY  2014    per patient partial @ time of aaa repair  not small bowel - large bowel bowel compromised bythtwe AAAbowel    TONSILLECTOMY  1955 aprox    URETERAL STENT PLACEMENT Left     annually replaced since 2012 or so  Dr Jin       Review of Systems   Constitutional: Negative for fever.   HENT: Negative for congestion and hearing loss.    Eyes: Negative for photophobia and visual disturbance.   Respiratory: Negative for cough, chest tightness, shortness of breath and wheezing.    Cardiovascular: Negative for chest pain and palpitations.   Gastrointestinal: Negative for constipation, diarrhea, nausea and vomiting.   Endocrine: Negative for cold intolerance and heat intolerance.  "  Genitourinary: Negative for flank pain.   Musculoskeletal: Positive for arthralgias and myalgias. Negative for neck pain and neck stiffness.   Skin: Positive for wound.   Neurological: Positive for numbness. Negative for light-headedness and headaches.   Psychiatric/Behavioral: Negative for sleep disturbance.          Objective:   BP (!) 146/72 (BP Location: Right arm, Patient Position: Sitting, BP Method: Medium (Automatic))   Pulse 70   Resp 16   Ht 6' 1" (1.854 m)       LOWER EXTREMITY PHYSICAL EXAMINATION    Physical Exam    DERMATOLOGY:  Ulceration, subcutaneous tissues with fat layer exposure, plantar distal aspect of the left transmetatarsal amputation site.  Moderate periwound hyperkeratosis noted. No erythema or cellulitis noted.  No fluctuance or malodor is appreciated.  No probe to bone or osseous exposure is noted.  Rolled hyperkeratotic edges are noted.  The wound measures approximately 1.1 cm x 8 mm with a depth of approximately 2 mm.  Granulation tissues only.  Mild pedal xerosis noted.    VASCULAR: On the left foot, the dorsalis pedis pulse is 0/4 and the posterior tibial pulse is 0/4. Capillary refill time is sluggish at greater than 3 seconds. Hair growth is absent on the dorsum of the foot and at the digits. No rubor is present. Proximal to distal temperature is warm to warm.  Slightly mottled skin appearance is noted.    NEUROLOGY: Protective sensation is not intact to the left plantar surfaces of the foot and digits, as the patient has no sensation/detection at greater than 4 distinct points of contact with 5.07 Cope Murray monofilament. Sensation to light touch is intact on the left foot. Proprioception is intact. Sensation to pin prick is reduced to absent. Vibratory sensation is diminished.    ORTHOPEDIC:  Right below-knee amputation.  Severe gastrocsoleus equinus contracture appreciated on the left.  Transmetatarsal amputation on the left.  Prominent palpable underlying proximal " metatarsal bones beneath the plantar flap on the left.    Assessment:     1. Chronic ulcer of left midfoot with fat layer exposed    2. 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    3. PVD (peripheral vascular disease)    4. Idiopathic peripheral neuropathy    5. CKD (chronic kidney disease) stage 3, GFR 30-59 ml/min    6. History of transmetatarsal amputation of left foot        Plan:     Chronic ulcer of left midfoot with fat layer exposed  Thorough discussion is had with the patient this afternoon, concerning the diagnosis, its etiology, and the treatment algorithm at present.    The wound was surgically debrided after adequate prep with alcohol and/or betadine paint. Excisional wound debridement was performed using sharp #10/#15 blade and tissue nipper, with removal of all non-viable skin and soft tissues; necrotic skin/tissue formation. The woundbase/wound bed was also debrided to encourage bleeding as to promote/stimulate healing. Debridement was excisional and included epidermal, dermal and subcutaneous tissues. Post debridement measurements are as above. Hemostasis was achieved. Patient tolerated procedure well and without complications. Local woundcare with Promogran Mima and bandage thereafter.  Please leave these dressings in place for duration of 1 week.  Patient will follow up in approximately 7 days.  If slight saturation and through the dressing, please change the outer bandage.    Prior XRAYS are reviewed in detail with the patient. All questions and concerns regarding findings and its/their implications are outlined and discussed.    Prior Wound C&S are also reviewed.     No signs of localized infection at this time.    We will attempt several weeks of collagen based products and/or synthetic allografts.  If no improvement, will consider STSG or a surgical procedures outlined below.  Patient does state prior split-thickness skin graft to the area, which did not take.      Status  post below knee amputation of right lower extremity    PVD (peripheral vascular disease)    Idiopathic peripheral neuropathy    CKD (chronic kidney disease) stage 3, GFR 30-59 ml/min    History of transmetatarsal amputation of left foot  The procedure of (DON on the LLE and/or with possible further resection of distal metatarsal segment) was thoroughly explained to the patient. Its necessity and/or purpose and the implications therein were outlined, including any pertinent advantages and/or disadvantages, and possible complications, if any. Possible complications include recurrence of pathology and/or deformity, infection (cellulitis, drainage, purulence, malodor, etc...), pain, numbness, neuritis, edema, burning, loss of function, need for further surgery, possible need for removal of any implanted hardware, soft tissue contracture and/or scarring, etc... No guarantees were given and/or implied. Post-operative expectations and weightbearing protocol is thoroughly explained the patient, who acknowledges understanding.         Future Appointments  Date Time Provider Department Center   8/9/2018 2:40 PM Fercho Carlson DPM ON POD  [facility]   6/3/2019 9:40 AM Scooter Jin IV, MD ON UROLOGY  [facility]

## 2018-08-03 ENCOUNTER — TELEPHONE (OUTPATIENT)
Dept: CARDIOLOGY | Facility: CLINIC | Age: 68
End: 2018-08-03

## 2018-08-03 NOTE — TELEPHONE ENCOUNTER
Spoke with Wanda at [facility].  Pt is needing approximately ten teeth extracted.  Pt is on Plavix 75mg daily and dentist needs to know if pt may hold Plavix for procedure.  If so for how long and when to resume.  Will ask Dr. Boone to advise.     ----- Message from Yuridia Lipscomb sent at 8/3/2018 11:23 AM CDT -----  Contact: [facility] - Ms Wanda  Stated she's calling about a pt clearance, she can be reach at   [phone] Thanks

## 2018-08-09 ENCOUNTER — OFFICE VISIT (OUTPATIENT)
Dept: PODIATRY | Facility: CLINIC | Age: 68
End: 2018-08-09
Payer: MEDICARE

## 2018-08-09 VITALS
HEIGHT: 73 IN | DIASTOLIC BLOOD PRESSURE: 72 MMHG | RESPIRATION RATE: 16 BRPM | HEART RATE: 68 BPM | SYSTOLIC BLOOD PRESSURE: 145 MMHG

## 2018-08-09 DIAGNOSIS — N18.30 CKD (CHRONIC KIDNEY DISEASE) STAGE 3, GFR 30-59 ML/MIN: ICD-10-CM

## 2018-08-09 DIAGNOSIS — Z89.511 STATUS POST BELOW KNEE AMPUTATION OF RIGHT LOWER EXTREMITY: ICD-10-CM

## 2018-08-09 DIAGNOSIS — G60.9 IDIOPATHIC PERIPHERAL NEUROPATHY: ICD-10-CM

## 2018-08-09 DIAGNOSIS — Z89.432 HISTORY OF TRANSMETATARSAL AMPUTATION OF LEFT FOOT: ICD-10-CM

## 2018-08-09 DIAGNOSIS — I73.9 PVD (PERIPHERAL VASCULAR DISEASE): ICD-10-CM

## 2018-08-09 DIAGNOSIS — L97.422 CHRONIC ULCER OF LEFT MIDFOOT WITH FAT LAYER EXPOSED: Primary | ICD-10-CM

## 2018-08-09 PROCEDURE — 99499 UNLISTED E&M SERVICE: CPT | Mod: S$GLB,,, | Performed by: PODIATRIST

## 2018-08-09 PROCEDURE — 99999 PR PBB SHADOW E&M-EST. PATIENT-LVL III: CPT | Mod: PBBFAC,,, | Performed by: PODIATRIST

## 2018-08-09 PROCEDURE — 11042 DBRDMT SUBQ TIS 1ST 20SQCM/<: CPT | Mod: S$GLB,,, | Performed by: PODIATRIST

## 2018-08-09 NOTE — PROGRESS NOTES
Subjective:       Patient ID: Kodi Ribeiro is a 68 y.o. male.    Chief Complaint: Wound Check (1 wk wound check, left foot, wears sock, no c/o pain, non-diabetic Pt, PCP Dr. Nuñez)      HPI: Patient presents to the clinic today, for follow up and treatment concerning an ulceration to the distal aspect the left foot transmetatarsal amputation stump. Patient's Primary Care Provider is Norma Nuñez MD. The PMHx. does include s/p RLE BKA, PVD, Idiopathic Peripheral Neuropathy, CKD and LLD TMA. Local woundcare product(s) most recently is/are collagen based dressings.  Patient states that he did change the dressing one time since last evaluation. He presents with his wife this afternoon.    Review of patient's allergies indicates:   Allergen Reactions    Morphine Itching       Past Medical History:   Diagnosis Date    Anemia     Arthritis     Colon polyp     Repeat colonoscopy due in 9/14    Coronary artery disease     Diverticulosis     colonoscopy 2/21/2014    Encounter for blood transfusion     GERD (gastroesophageal reflux disease)     Hemorrhoids     colonoscopy 2/21/2014    Horseshoe kidney     Hyperglycemia 3/17/2014    Hyperlipidemia     Hypertension     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    Late complications of amputation stump     rseolved with further amputation( MRSA then none since 2014)    Lipoma of colon     colonoscopy 2/21/2014    Myocardial infarction     per patient 2000 & 9/2012    Peripheral vascular disease     Phantom limb syndrome     patient reports only intermittent not problematic, not worsening    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    Spinal cord disease     L4L5 disc    Stroke     Tobacco dependence     resolved    Ureteral stent retained        Family History   Problem Relation Age of Onset    Cancer Mother         lung    COPD Mother     Heart disease Father         MI but per patient bc of old age    Diabetes Daughter     Eczema Neg Hx      Lupus Neg Hx     Psoriasis Neg Hx     Melanoma Neg Hx     Kidney disease Neg Hx     Stroke Neg Hx     Mental illness Neg Hx     Mental retardation Neg Hx     Hypertension Neg Hx     Hyperlipidemia Neg Hx     Drug abuse Neg Hx     Alcohol abuse Neg Hx     Depression Neg Hx        Social History     Social History    Marital status:      Spouse name: Laury    Number of children: 2    Years of education: N/A     Occupational History    Retired       [employer]     Social History Main Topics    Smoking status: Former Smoker     Packs/day: 1.00     Years: 15.00     Quit date: 1/1/2009    Smokeless tobacco: Never Used    Alcohol use No    Drug use: No      Comment: Is on prescription opiod, no non prescribed use    Sexual activity: Not Currently     Partners: Female     Other Topics Concern    Not on file     Social History Narrative     . 4 children alive and well. 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  Still drives. Does not have a Living Will.       Past Surgical History:   Procedure Laterality Date    ABDOMINAL AORTIC ANEURYSM REPAIR      ABDOMINAL AORTIC ANEURYSM REPAIR  1996/2014    AMPUTATION, LOWER LIMB      AORTA - BILATERAL FEMORAL ARTERY BYPASS GRAFT  2014    Left and right leg    CORONARY ANGIOPLASTY WITH STENT PLACEMENT  2000    Three placed in heart    CYSTOSCOPY W/ RETROGRADES Left 5/29/2018    Procedure: CYSTOSCOPY, WITH RETROGRADE PYELOGRAM;  Surgeon: Scotoer Jin IV, MD;  Location: [facility] OR;  Service: Urology;  Laterality: Left;    CYSTOSCOPY W/ URETERAL STENT PLACEMENT Left 5/29/2018    Procedure: CYSTOSCOPY, WITH URETERAL STENT INSERTION;  Surgeon: Scooter Jin IV, MD;  Location: [facility] OR;  Service: Urology;  Laterality: Left;    CYSTOSCOPY W/ URETERAL STENT REMOVAL Left 5/29/2018    Procedure: CYSTOSCOPY, WITH URETERAL STENT REMOVAL;  Surgeon: Scooter Jin IV, MD;  Location: [facility]  "OR;  Service: Urology;  Laterality: Left;    FOOT AMPUTATION THROUGH METATARSAL  1996    left    FOOT SURGERY Bilateral 1980's    per patient multiple toe amputations prior to.  partial foot amputation:first great toe then other toes     KIDNEY SURGERY  2014    per patient separation of horseshoe kidney @ time of AAA repair    LUNG LOBECTOMY Right 1970s    per patient not cancer    right below knee amputation  2009 (approx)    SMALL INTESTINE SURGERY  2014    per patient partial @ time of aaa repair  not small bowel - large bowel bowel compromised bythtwe AAAbowel    TONSILLECTOMY  1955 aprox    URETERAL STENT PLACEMENT Left     annually replaced since 2012 or so  Dr Jin       Review of Systems   Constitutional: Negative for fever.   HENT: Negative for congestion and hearing loss.    Eyes: Negative for photophobia and visual disturbance.   Respiratory: Negative for cough, chest tightness, shortness of breath and wheezing.    Cardiovascular: Negative for chest pain and palpitations.   Gastrointestinal: Negative for constipation, diarrhea, nausea and vomiting.   Endocrine: Negative for cold intolerance and heat intolerance.   Genitourinary: Negative for flank pain.   Musculoskeletal: Negative for neck pain and neck stiffness.   Skin: Positive for wound.   Neurological: Positive for numbness. Negative for light-headedness and headaches.   Psychiatric/Behavioral: Negative for sleep disturbance.          Objective:   BP (!) 145/72 (BP Location: Right arm, Patient Position: Sitting, BP Method: Medium (Automatic))   Pulse 68   Resp 16   Ht 6' 1" (1.854 m)       LOWER EXTREMITY PHYSICAL EXAMINATION    Physical Exam    ORTHOPEDIC:  Right below-knee amputation.  Severe gastrocsoleus equinus contracture appreciated on the left.  Transmetatarsal amputation on the left.  Prominent palpable underlying proximal metatarsal bones beneath the plantar flap on the left.    DERMATOLOGY:  Persistent ulceration of the " plantar distal aspect of the left lower extremity transmetatarsal amputation stump.  The wound measures approximately 8 mm by 6 mm with a depth of 2 mm.  Minimal periwound hyperkeratosis noted. No probe to bone or osseous exposure is noted.  Granular base noted. No drainage, fluctuance or malodor appreciated.  No abscess formation.    NEUROLOGY: Protective sensation is not intact to the left plantar surfaces of the foot and digits, as the patient has no sensation/detection at greater than 4 distinct points of contact with 5.07 [place] Murray monofilament. Sensation to light touch is intact on the left foot. Proprioception is intact. Sensation to pin prick is reduced to absent. Vibratory sensation is diminished.    VASCULAR: Slightly mottled skin appearance is noted. On the left foot, the dorsalis pedis pulse is 0/4 and the posterior tibial pulse is 0/4. Capillary refill time is sluggish at greater than 3 seconds. Hair growth is absent on the dorsum of the foot and at the digits. No rubor is present. Proximal to distal temperature is warm to warm.  Elevation pallor is noted.  Dependent rubor is absent.       Assessment:     1. Chronic ulcer of left midfoot with fat layer exposed    2. 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    3. PVD (peripheral vascular disease)    4. Idiopathic peripheral neuropathy    5. CKD (chronic kidney disease) stage 3, GFR 30-59 ml/min    6. History of transmetatarsal amputation of left foot        Plan:     Chronic ulcer of left midfoot with fat layer exposed    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    PVD (peripheral vascular disease)    Idiopathic peripheral neuropathy    CKD (chronic kidney disease) stage 3, GFR 30-59 ml/min    History of transmetatarsal amputation of left foot        Thorough discussion is had with the patient this afternoon, concerning the diagnosis, its etiology, and the treatment algorithm at present.  The wound is improved  in terms of dimensions as compared to last evaluation.  The wound was surgically debrided after adequate prep with alcohol and/or betadine paint. Excisional wound debridement was performed using sharp #10/#15 blade and tissue nipper, with removal of all non-viable skin and soft tissues; necrotic skin/tissue formation. The woundbase/wound bed was also debrided to encourage bleeding as to promote/stimulate healing. Debridement was excisional and included epidermal, dermal and subcutaneous tissues. Post debridement measurements are as above. Hemostasis was achieved. Patient tolerated procedure well and without complications. Local woundcare with collagen based dressing thereafter.  Patient may change dressings approximately 4 days.  Limit weight-bearing and ambulation on the stump.  Please follow up in approximately 7 days.     Will attempt to authorize NEOX graft for follow up appt.         Future Appointments  Date Time Provider Department Center   8/16/2018 1:00 PM Fercho Carlson DPM ON POD [facility]   8/23/2018 1:40 PM Fercho Carlson DPM ONLC POD [facility]   8/30/2018 2:00 PM Fercho Carlson DPM ONLC POD [facility]   6/3/2019 9:40 AM Scooter Jin IV, MD [facility] UROLOGY [facility]

## 2018-08-16 ENCOUNTER — OFFICE VISIT (OUTPATIENT)
Dept: PODIATRY | Facility: CLINIC | Age: 68
End: 2018-08-16
Payer: MEDICARE

## 2018-08-16 VITALS
SYSTOLIC BLOOD PRESSURE: 148 MMHG | HEART RATE: 66 BPM | BODY MASS INDEX: 27.05 KG/M2 | RESPIRATION RATE: 16 BRPM | HEIGHT: 73 IN | DIASTOLIC BLOOD PRESSURE: 70 MMHG

## 2018-08-16 DIAGNOSIS — I73.9 PVD (PERIPHERAL VASCULAR DISEASE): ICD-10-CM

## 2018-08-16 DIAGNOSIS — Z89.432 HISTORY OF TRANSMETATARSAL AMPUTATION OF LEFT FOOT: ICD-10-CM

## 2018-08-16 DIAGNOSIS — Z89.511 STATUS POST BELOW KNEE AMPUTATION OF RIGHT LOWER EXTREMITY: Primary | ICD-10-CM

## 2018-08-16 DIAGNOSIS — L97.422 CHRONIC ULCER OF LEFT MIDFOOT WITH FAT LAYER EXPOSED: ICD-10-CM

## 2018-08-16 DIAGNOSIS — N18.30 CKD (CHRONIC KIDNEY DISEASE) STAGE 3, GFR 30-59 ML/MIN: ICD-10-CM

## 2018-08-16 DIAGNOSIS — G60.9 IDIOPATHIC PERIPHERAL NEUROPATHY: ICD-10-CM

## 2018-08-16 PROCEDURE — 99999 PR PBB SHADOW E&M-EST. PATIENT-LVL III: CPT | Mod: PBBFAC,,, | Performed by: PODIATRIST

## 2018-08-16 PROCEDURE — 11042 DBRDMT SUBQ TIS 1ST 20SQCM/<: CPT | Mod: S$GLB,,, | Performed by: PODIATRIST

## 2018-08-16 PROCEDURE — 99499 UNLISTED E&M SERVICE: CPT | Mod: HCNC,S$GLB,, | Performed by: PODIATRIST

## 2018-08-16 PROCEDURE — 99499 UNLISTED E&M SERVICE: CPT | Mod: S$GLB,,, | Performed by: PODIATRIST

## 2018-08-16 NOTE — PROGRESS NOTES
Subjective:       Patient ID: Kodi Ribeiro is a 68 y.o. male.    Chief Complaint: Wound Check (1 week wound check, left foot, wwears sock, assisted with wheel chair, non-diabetic Pt, PCP Dr. Nuñez)      HPI: Patient presents to the clinic today, for follow up and treatment concerning an ulceration to the distal plantar aspect of the left transmetatarsal amputation site. Patient's Primary Care Provider is Norma Nuñez MD. The PMHx. does include idiopathic peripheral neuropathy and peripheral vascular disease. Local woundcare product(s) most recently is/are Promogran Mima.  The patient does state, within the past 3 months, vascular intervention.  He presents this afternoon with his wife.  He does have a right lower extremity below-knee amputation.  He does not smoke cigarettes.  He states that in his opinion, the ulceration is not improving.  I did apply for operative physician for a allograft skin graft after the last evaluation, I have yet to hear back concerning this.  Patient presents ambulating with a sneaker on the left foot.    Review of patient's allergies indicates:   Allergen Reactions    Morphine Itching       Past Medical History:   Diagnosis Date    Anemia     Arthritis     Colon polyp     Repeat colonoscopy due in 9/14    Coronary artery disease     Diverticulosis     colonoscopy 2/21/2014    Encounter for blood transfusion     GERD (gastroesophageal reflux disease)     Hemorrhoids     colonoscopy 2/21/2014    Horseshoe kidney     Hyperglycemia 3/17/2014    Hyperlipidemia     Hypertension     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    Late complications of amputation stump     rseolved with further amputation( MRSA then none since 2014)    Lipoma of colon     colonoscopy 2/21/2014    Myocardial infarction     per patient 2000 & 9/2012    Peripheral vascular disease     Phantom limb syndrome     patient reports only intermittent not problematic, not worsening    S/P  aorto-bifemoral bypass surgery 3/17/2014    Spinal cord disease     L4L5 disc    Stroke     Tobacco dependence     resolved    Ureteral stent retained        Family History   Problem Relation Age of Onset    Cancer Mother         lung    COPD Mother     Heart disease Father         MI but per patient bc of old age    Diabetes Daughter     Eczema Neg Hx     Lupus Neg Hx     Psoriasis Neg Hx     Melanoma Neg Hx     Kidney disease Neg Hx     Stroke Neg Hx     Mental illness Neg Hx     Mental retardation Neg Hx     Hypertension Neg Hx     Hyperlipidemia Neg Hx     Drug abuse Neg Hx     Alcohol abuse Neg Hx     Depression Neg Hx        Social History     Socioeconomic History    Marital status:      Spouse name: Laury    Number of children: 2    Years of education: Not on file    Highest education level: Not on file   Social Needs    Financial resource strain: Not on file    Food insecurity - worry: Not on file    Food insecurity - inability: Not on file    Transportation needs - medical: Not on file    Transportation needs - non-medical: Not on file   Occupational History    Occupation: Retired      Comment: [employer]   Tobacco Use    Smoking status: Former Smoker     Packs/day: 1.00     Years: 15.00     Pack years: 15.00     Last attempt to quit: 2009     Years since quittin.6    Smokeless tobacco: Never Used   Substance and Sexual Activity    Alcohol use: No    Drug use: No     Comment: Is on prescription opiod, no non prescribed use    Sexual activity: Not Currently     Partners: Female   Other Topics Concern    Not on file   Social History Narrative     . 4 children alive and well. 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  Still drives. Does not have a Living Will.       Past Surgical History:   Procedure Laterality Date    ABDOMINAL AORTIC ANEURYSM REPAIR      ABDOMINAL AORTIC ANEURYSM REPAIR  " 1996/2014    AMPUTATION, LOWER LIMB      AORTA - BILATERAL FEMORAL ARTERY BYPASS GRAFT  2014    Left and right leg    CORONARY ANGIOPLASTY WITH STENT PLACEMENT  2000    Three placed in heart    FOOT AMPUTATION THROUGH METATARSAL  1996    left    FOOT SURGERY Bilateral 1980's    per patient multiple toe amputations prior to.  partial foot amputation:first great toe then other toes     KIDNEY SURGERY  2014    per patient separation of horseshoe kidney @ time of AAA repair    LUNG LOBECTOMY Right 1970s    per patient not cancer    right below knee amputation  2009 (approx)    SMALL INTESTINE SURGERY  2014    per patient partial @ time of aaa repair  not small bowel - large bowel bowel compromised bythtwe AAAbowel    TONSILLECTOMY  1955 aprox    URETERAL STENT PLACEMENT Left     annually replaced since 2012 or so  Dr Jin       Review of Systems   Constitutional: Negative for chills, fatigue and fever.   HENT: Negative for hearing loss.    Eyes: Negative for photophobia and visual disturbance.   Respiratory: Negative for cough, chest tightness, shortness of breath and wheezing.    Cardiovascular: Negative for chest pain and palpitations.   Gastrointestinal: Negative for constipation, diarrhea, nausea and vomiting.   Endocrine: Negative for cold intolerance and heat intolerance.   Genitourinary: Negative for flank pain.   Musculoskeletal: Negative for neck pain and neck stiffness.   Skin: Positive for color change and wound.   Neurological: Positive for numbness. Negative for light-headedness and headaches.   Psychiatric/Behavioral: Negative for sleep disturbance.          Objective:   BP (!) 148/70 (BP Location: Right arm, Patient Position: Sitting, BP Method: Medium (Automatic))   Pulse 66   Resp 16   Ht 6' 1" (1.854 m)   BMI 27.05 kg/m²       LOWER EXTREMITY PHYSICAL EXAMINATION    Physical Exam    DERMATOLOGY: 6mm x 8mm x 2mm granular ulceration to the level of subcutaneous tissues with fat layer " exposure, left plantar distal foot. The wound is appreciably unchanged from prior.  Moderate periwound hyperkeratosis noted. 100% granulation tissue to the wound bed.  No undermining or sinus tracts noted. No fluctuance, probe to bone, or osseous exposure is noted. No pain to palpation.  Palpable underlying metatarsal shafts are noted. Periwound fibrosis noted.    ORTHOPEDIC:  Right below-knee amputation.  Severe gastrocsoleus equinus contracture appreciated on the left.  Transmetatarsal amputation on the left.  Prominent palpable underlying proximal metatarsal bones beneath the plantar flap on the left.    NEUROLOGY: Protective sensation is not intact to the left plantar surfaces of the foot and digits, as the patient has no sensation/detection at greater than 4 distinct points of contact with 5.07 [place] Murray monofilament. Sensation to light touch is intact on the left foot. Proprioception is intact. Sensation to pin prick is reduced to absent. Vibratory sensation is diminished.     VASCULAR: Slightly mottled skin appearance is noted. On the left foot, the dorsalis pedis pulse is 0/4 and the posterior tibial pulse is 0/4. Capillary refill time is sluggish at greater than 3 seconds. Hair growth is absent on the dorsum of the foot and at the digits. No rubor is present. Proximal to distal temperature is warm to warm.  Elevation pallor is noted.  Dependent rubor is absent.         Assessment:     1. 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    2. PVD (peripheral vascular disease)    3. Idiopathic peripheral neuropathy    4. CKD (chronic kidney disease) stage 3, GFR 30-59 ml/min    5. Chronic ulcer of left midfoot with fat layer exposed    6. History of transmetatarsal amputation of left foot        Plan:     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    PVD (peripheral vascular disease)    Idiopathic peripheral neuropathy    CKD (chronic kidney disease) stage 3, GFR 30-59  ml/min    Chronic ulcer of left midfoot with fat layer exposed    History of transmetatarsal amputation of left foot      The wound is since the unchanged from prior.  The patient does have severe gastrocsoleus equinus contracture, with a thin layer of subcutaneous tissue between the underlying partially resected bone in the skin wound.  These issues are likely causing and leading to a recalcitrant wound status.  This, in conjunction with his chronic kidney disease and peripheral neuropathy, as well as peripheral vascular disease, is causing his wound to be nonhealing and chronic.  No underlying bone infection is suspected.  Recent radiographs have been negative for such.    The wound was surgically debrided after adequate prep with alcohol and/or betadine paint. Excisional wound debridement was performed using sharp #10/#15 blade and tissue nipper, with removal of all non-viable skin and soft tissues; necrotic skin/tissue formation. The woundbase/wound bed was also debrided to encourage bleeding as to promote/stimulate healing. Debridement was excisional and included epidermal, dermal and subcutaneous tissues. Post debridement measurements are as above. Hemostasis was achieved. Patient tolerated procedure well and without complications. Local woundcare with collagen based dressings and bandage thereafter.     He may continue ambulation with a sneaker on the left foot for now.  Please obtain the aforementioned vascular testing/studies/procedure notes for next week evaluation.  I will follow up with the insurance concerning authorization of the allograft application.  We did once again discuss possible surgical intervention a former further bone resection at the amputation site, with a tendo-Achilles lengthening.  The patient does acknowledge understanding.  Thorough discussion is had with the patient today, concerning the diagnosis, its etiology, and the treatment algorithm at present.  The patient will follow up in  approximately 2 weeks.  He will change the dressings Q 4 days.  Please notify my office in the event of complications or infection.                Future Appointments   Date Time Provider Department Center   8/23/2018  1:40 PM Fercho Carlson DPM ON POD [facility]   8/30/2018  2:00 PM Fercho Carlson DPM ON POD [facility]   6/3/2019  9:40 AM Scooter Jin IV, MD ON UROLOGY [facility]

## 2018-08-30 ENCOUNTER — OFFICE VISIT (OUTPATIENT)
Dept: PODIATRY | Facility: CLINIC | Age: 68
End: 2018-08-30
Payer: MEDICARE

## 2018-08-30 VITALS
HEIGHT: 73 IN | HEART RATE: 70 BPM | BODY MASS INDEX: 27.05 KG/M2 | DIASTOLIC BLOOD PRESSURE: 62 MMHG | RESPIRATION RATE: 16 BRPM | SYSTOLIC BLOOD PRESSURE: 133 MMHG

## 2018-08-30 DIAGNOSIS — N18.30 CKD (CHRONIC KIDNEY DISEASE) STAGE 3, GFR 30-59 ML/MIN: ICD-10-CM

## 2018-08-30 DIAGNOSIS — I73.9 PVD (PERIPHERAL VASCULAR DISEASE): ICD-10-CM

## 2018-08-30 DIAGNOSIS — G60.9 IDIOPATHIC PERIPHERAL NEUROPATHY: ICD-10-CM

## 2018-08-30 DIAGNOSIS — L97.422 CHRONIC ULCER OF LEFT MIDFOOT WITH FAT LAYER EXPOSED: Primary | ICD-10-CM

## 2018-08-30 DIAGNOSIS — Z89.511 STATUS POST BELOW KNEE AMPUTATION OF RIGHT LOWER EXTREMITY: ICD-10-CM

## 2018-08-30 DIAGNOSIS — Z89.432 HISTORY OF TRANSMETATARSAL AMPUTATION OF LEFT FOOT: ICD-10-CM

## 2018-08-30 PROCEDURE — 99499 UNLISTED E&M SERVICE: CPT | Mod: S$GLB,,, | Performed by: PODIATRIST

## 2018-08-30 PROCEDURE — 99999 PR PBB SHADOW E&M-EST. PATIENT-LVL III: CPT | Mod: PBBFAC,,, | Performed by: PODIATRIST

## 2018-08-30 PROCEDURE — 11042 DBRDMT SUBQ TIS 1ST 20SQCM/<: CPT | Mod: S$GLB,,, | Performed by: PODIATRIST

## 2018-08-30 NOTE — PROGRESS NOTES
Subjective:       Patient ID: Kodi Ribeiro is a 68 y.o. male.    Chief Complaint: Wound Check (wound check left foot, non diabetic pt, no c/o pain, wears sock, PCP Dr. Nuñez)      HPI: Kodi Ribeiro  presents to the clinic today, for follow up and treatment concerning an ulceration to the plantar distal aspect of the left foot transmetatarsal amputation. Patient's Primary Care Provider is Norma Nuñez MD. The PMHx. does include CKD, idiopathic peripheral neuropathy and peripheral vascular disease, status post right below-knee amputation. Local woundcare product(s) most recently is/are Promogran Mima.  As per the patient's wife, the wound has been improving since I have been seeing him for the past month or so.  We did attempt to obtain authorization for a NEOX allograft, but the insurance denied this.  The patient or his wife state local signs infection at this time.  The patient denies systemic signs infection.  Is ambulatory with a wheelchair.  He typically wears a sneaker on the left foot.    Review of patient's allergies indicates:   Allergen Reactions    Morphine Itching       Past Medical History:   Diagnosis Date    Anemia     Arthritis     Colon polyp     Repeat colonoscopy due in 9/14    Coronary artery disease     Diverticulosis     colonoscopy 2/21/2014    Encounter for blood transfusion     GERD (gastroesophageal reflux disease)     Hemorrhoids     colonoscopy 2/21/2014    Horseshoe kidney     Hyperglycemia 3/17/2014    Hyperlipidemia     Hypertension     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    Late complications of amputation stump     rseolved with further amputation( MRSA then none since 2014)    Lipoma of colon     colonoscopy 2/21/2014    Myocardial infarction     per patient 2000 & 9/2012    Peripheral vascular disease     Phantom limb syndrome     patient reports only intermittent not problematic, not worsening    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     Spinal cord disease     L4L5 disc    Stroke     Tobacco dependence     resolved    Ureteral stent retained        Family History   Problem Relation Age of Onset    Cancer Mother         lung    COPD Mother     Heart disease Father         MI but per patient bc of old age    Diabetes Daughter     Eczema Neg Hx     Lupus Neg Hx     Psoriasis Neg Hx     Melanoma Neg Hx     Kidney disease Neg Hx     Stroke Neg Hx     Mental illness Neg Hx     Mental retardation Neg Hx     Hypertension Neg Hx     Hyperlipidemia Neg Hx     Drug abuse Neg Hx     Alcohol abuse Neg Hx     Depression Neg Hx        Social History     Socioeconomic History    Marital status:      Spouse name: Laury    Number of children: 2    Years of education: Not on file    Highest education level: Not on file   Social Needs    Financial resource strain: Not on file    Food insecurity - worry: Not on file    Food insecurity - inability: Not on file    Transportation needs - medical: Not on file    Transportation needs - non-medical: Not on file   Occupational History    Occupation: Retired      Comment: [employer]   Tobacco Use    Smoking status: Former Smoker     Packs/day: 1.00     Years: 15.00     Pack years: 15.00     Last attempt to quit: 2009     Years since quittin.6    Smokeless tobacco: Never Used   Substance and Sexual Activity    Alcohol use: No    Drug use: No     Comment: Is on prescription opiod, no non prescribed use    Sexual activity: Not Currently     Partners: Female   Other Topics Concern    Not on file   Social History Narrative     . 4 children alive and well. 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  Still drives. Does not have a Living Will.       Past Surgical History:   Procedure Laterality Date    ABDOMINAL AORTIC ANEURYSM REPAIR      ABDOMINAL AORTIC ANEURYSM REPAIR      AMPUTATION, LOWER LIMB       "AORTA - BILATERAL FEMORAL ARTERY BYPASS GRAFT  2014    Left and right leg    CORONARY ANGIOPLASTY WITH STENT PLACEMENT  2000    Three placed in heart    FOOT AMPUTATION THROUGH METATARSAL  1996    left    FOOT SURGERY Bilateral 1980's    per patient multiple toe amputations prior to.  partial foot amputation:first great toe then other toes     KIDNEY SURGERY  2014    per patient separation of horseshoe kidney @ time of AAA repair    LUNG LOBECTOMY Right 1970s    per patient not cancer    right below knee amputation  2009 (approx)    SMALL INTESTINE SURGERY  2014    per patient partial @ time of aaa repair  not small bowel - large bowel bowel compromised bythtwe AAAbowel    TONSILLECTOMY  1955 aprox    URETERAL STENT PLACEMENT Left     annually replaced since 2012 or so  Dr Jin       Review of Systems   Constitutional: Negative for chills, fatigue and fever.   HENT: Negative for hearing loss.    Eyes: Negative for photophobia and visual disturbance.   Respiratory: Negative for cough, chest tightness, shortness of breath and wheezing.    Cardiovascular: Negative for chest pain and palpitations.   Gastrointestinal: Negative for constipation, diarrhea, nausea and vomiting.   Endocrine: Negative for cold intolerance and heat intolerance.   Genitourinary: Negative for flank pain.   Musculoskeletal: Negative for neck pain and neck stiffness.   Skin: Positive for wound.   Neurological: Positive for numbness. Negative for light-headedness and headaches.   Psychiatric/Behavioral: Negative for sleep disturbance.          Objective:   /62 (BP Location: Left arm, Patient Position: Sitting, BP Method: Medium (Automatic))   Pulse 70   Resp 16   Ht 6' 1" (1.854 m)   BMI 27.05 kg/m²       LOWER EXTREMITY PHYSICAL EXAMINATION    Physical Exam    DERMATOLOGY:  Persistent ulceration at the plantar distal aspect the left foot transmetatarsal amputation site.  The ulceration to the level of subcutaneous tissues " with fat layer exposure.  There is no osseous exposure.  No probe to bone.  No malodor drainage is noted. Minimal rolled, hyperkeratotic edges are noted. No periwound erythema or cellulitis noted.  The wound measures approximately 5mm x 7mm x 2mm, which is improved from 6mm x 8mm x 2mm at the last evaluation.    ORTHOPEDIC:  Right below-knee amputation. Transmetatarsal amputation on the left. Severe gastrocsoleus equinus contracture appreciated on the left. Prominent palpable underlying proximal metatarsal bones beneath the plantar flap on the left.     NEUROLOGY: Protective sensation is not intact to the left plantar surfaces of the foot and digits, as the patient has no sensation/detection at greater than 4 distinct points of contact with 5.07 [place] monofilament. Sensation to light touch is intact on the left foot. Proprioception is intact. Sensation to pin prick is reduced to absent. Vibratory sensation is diminished.     VASCULAR: On the left foot, the dorsalis pedis pulse is 0/4 and the posterior tibial pulse is 0/4. Capillary refill time is sluggish at greater than 3 seconds. Hair growth is absent on the dorsum of the foot and at the digits. Mottled skin appearance is noted.    Assessment:     1. Chronic ulcer of left midfoot with fat layer exposed    2. 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    3. History of transmetatarsal amputation of left foot    4. Idiopathic peripheral neuropathy    5. PVD (peripheral vascular disease)    6. CKD (chronic kidney disease) stage 3, GFR 30-59 ml/min        Plan:     Chronic ulcer of left midfoot with fat layer exposed  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  History of transmetatarsal amputation of left foot  Idiopathic peripheral neuropathy  PVD (peripheral vascular disease)  Stable ulceration to the plantar distal aspect of the left transmetatarsal amputation site.  There are no signs of infection at this time.  The  patient was denied application of allograft by his insurance company.  For now, will continue collagen based dressings every 4 days or so, with aggressive offloading.  Recent x-rays were negative for pathology.  Recent cultures were negative bacterial bioburden.    The wound was surgically debrided after adequate prep with alcohol and/or betadine paint. Excisional wound debridement was performed using sharp #10/#15 blade and tissue nipper, with removal of all non-viable skin and soft tissues; necrotic skin/tissue formation. The woundbase/wound bed was also debrided to encourage bleeding as to promote/stimulate healing. Debridement was excisional and included epidermal, dermal and subcutaneous tissues. Post debridement measurements are as above. Hemostasis was achieved. Patient tolerated procedure well and without complications. Local woundcare with collagen based dressings and bandage thereafter.     Vascular surgery follow-up.  Patient advised to follow up with Primary Care Physician for management of comorbid states.  Will repeat foot x-ray and cultures upon follow-up.    CKD (chronic kidney disease) stage 3, GFR 30-59 ml/min  Patient advised to follow up with Nephrologist for management of kidney pathology.          Future Appointments   Date Time Provider Department Center   9/13/2018  1:15 PM Fercho Carlson DPM ON POD  [facility]   6/3/2019  9:40 AM Scooter Jin IV, MD ON UROLOGY  [facility]

## 2018-09-13 ENCOUNTER — OFFICE VISIT (OUTPATIENT)
Dept: PODIATRY | Facility: CLINIC | Age: 68
End: 2018-09-13
Payer: MEDICARE

## 2018-09-13 VITALS
RESPIRATION RATE: 16 BRPM | SYSTOLIC BLOOD PRESSURE: 146 MMHG | DIASTOLIC BLOOD PRESSURE: 62 MMHG | HEART RATE: 69 BPM | BODY MASS INDEX: 27.05 KG/M2 | HEIGHT: 73 IN

## 2018-09-13 DIAGNOSIS — N18.30 CKD (CHRONIC KIDNEY DISEASE) STAGE 3, GFR 30-59 ML/MIN: ICD-10-CM

## 2018-09-13 DIAGNOSIS — Z89.511 STATUS POST BELOW KNEE AMPUTATION OF RIGHT LOWER EXTREMITY: ICD-10-CM

## 2018-09-13 DIAGNOSIS — Z89.432 HISTORY OF TRANSMETATARSAL AMPUTATION OF LEFT FOOT: ICD-10-CM

## 2018-09-13 DIAGNOSIS — I73.9 PVD (PERIPHERAL VASCULAR DISEASE): ICD-10-CM

## 2018-09-13 DIAGNOSIS — L97.502 ULCER OF FOOT WITH FAT LAYER EXPOSED, UNSPECIFIED LATERALITY: ICD-10-CM

## 2018-09-13 DIAGNOSIS — G60.9 IDIOPATHIC PERIPHERAL NEUROPATHY: ICD-10-CM

## 2018-09-13 DIAGNOSIS — L97.422 CHRONIC ULCER OF LEFT MIDFOOT WITH FAT LAYER EXPOSED: Primary | ICD-10-CM

## 2018-09-13 PROCEDURE — 99999 PR PBB SHADOW E&M-EST. PATIENT-LVL III: CPT | Mod: PBBFAC,,, | Performed by: PODIATRIST

## 2018-09-13 PROCEDURE — 11042 DBRDMT SUBQ TIS 1ST 20SQCM/<: CPT | Mod: PBBFAC | Performed by: PODIATRIST

## 2018-09-13 PROCEDURE — 99213 OFFICE O/P EST LOW 20 MIN: CPT | Mod: PBBFAC | Performed by: PODIATRIST

## 2018-09-13 PROCEDURE — 99499 UNLISTED E&M SERVICE: CPT | Mod: S$PBB,,, | Performed by: PODIATRIST

## 2018-09-13 PROCEDURE — 11042 DBRDMT SUBQ TIS 1ST 20SQCM/<: CPT | Mod: S$PBB,,, | Performed by: PODIATRIST

## 2018-09-13 NOTE — PROGRESS NOTES
Subjective:       Patient ID: oKdi Ribeiro is a 68 y.o. male.    Chief Complaint: Wound Check (Left foot, no c/o pain, wears sock, assisted with wheel chair, non-diabetic Pt, PCP Dr. Nuñez)    HPI: Kodi Ribeiro presents to the office today, for follow-up concerning a chronic ulceration at the distal plantar aspect of the left transmetatarsal amputation stump.  I have seen this patient for the aforementioned wound for approximately the past 2 months.  There was some attempt on my be have, to obtain authorization for and skin allograft for the area, but the patient insurance company denied such.  In the interim, the patient has been doing every 3rd day collagen based dressings.  He presents here with his wife this afternoon.  The patient's wife state that the ulceration does appear to be improving.  He does have a past medical history was significant for CKD, idiopathic peripheral neuropathy and peripheral vascular disease,  and status post right below-knee amputation.  Patient denies local or systemic signs infection at this time. Norma Nuñez MD is his internist.  States no pain to the lower extremity due to neuropathy.    Review of patient's allergies indicates:   Allergen Reactions    Morphine Itching       Past Medical History:   Diagnosis Date    Anemia     Arthritis     Colon polyp     Repeat colonoscopy due in 9/14    Coronary artery disease     Diverticulosis     colonoscopy 2/21/2014    Encounter for blood transfusion     GERD (gastroesophageal reflux disease)     Hemorrhoids     colonoscopy 2/21/2014    Horseshoe kidney     Hyperglycemia 3/17/2014    Hyperlipidemia     Hypertension     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    Late complications of amputation stump     rseolved with further amputation( MRSA then none since 2014)    Lipoma of colon     colonoscopy 2/21/2014    Myocardial infarction     per patient 2000 & 9/2012    Peripheral vascular disease     Phantom limb  syndrome     patient reports only intermittent not problematic, not worsening    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    Spinal cord disease     L4L5 disc    Stroke     Tobacco dependence     resolved    Ureteral stent retained        Family History   Problem Relation Age of Onset    Cancer Mother         lung    COPD Mother     Heart disease Father         MI but per patient bc of old age    Diabetes Daughter     Eczema Neg Hx     Lupus Neg Hx     Psoriasis Neg Hx     Melanoma Neg Hx     Kidney disease Neg Hx     Stroke Neg Hx     Mental illness Neg Hx     Mental retardation Neg Hx     Hypertension Neg Hx     Hyperlipidemia Neg Hx     Drug abuse Neg Hx     Alcohol abuse Neg Hx     Depression Neg Hx        Social History     Socioeconomic History    Marital status:      Spouse name: Laury    Number of children: 2    Years of education: Not on file    Highest education level: Not on file   Social Needs    Financial resource strain: Not on file    Food insecurity - worry: Not on file    Food insecurity - inability: Not on file    Transportation needs - medical: Not on file    Transportation needs - non-medical: Not on file   Occupational History    Occupation: Retired      Comment: [employer]   Tobacco Use    Smoking status: Former Smoker     Packs/day: 1.00     Years: 15.00     Pack years: 15.00     Last attempt to quit: 2009     Years since quittin.7    Smokeless tobacco: Never Used   Substance and Sexual Activity    Alcohol use: No    Drug use: No     Comment: Is on prescription opiod, no non prescribed use    Sexual activity: Not Currently     Partners: Female   Other Topics Concern    Not on file   Social History Narrative     . 4 children alive and well. 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  Still drives. Does not have a Living Will.       Past Surgical History:   Procedure  Laterality Date    ABDOMINAL AORTIC ANEURYSM REPAIR      ABDOMINAL AORTIC ANEURYSM REPAIR  1996/2014    AMPUTATION, LOWER LIMB      AORTA - BILATERAL FEMORAL ARTERY BYPASS GRAFT  2014    Left and right leg    COLONOSCOPY N/A 2/21/2014    [facility]anner [facility] at [facility] ENDO    CORONARY ANGIOPLASTY WITH STENT PLACEMENT  2000    Three placed in heart    CREATION, BYPASS, ARTERIAL, AORTA TO FEMORAL, BILATERAL Right 3/16/2014    Performed by Stefan Jamil MD at [facility] OR    CYSTOSCOPY W/ RETROGRADES Left 5/29/2018    Procedure: CYSTOSCOPY, WITH RETROGRADE PYELOGRAM;  Surgeon: Scooter Jin IV, MD;  Location: [facility] OR;  Service: Urology;  Laterality: Left;    CYSTOSCOPY W/ URETERAL STENT PLACEMENT Left 5/29/2018    Procedure: CYSTOSCOPY, WITH URETERAL STENT INSERTION;  Surgeon: Scooter Jin IV, MD;  Location: [facility] OR;  Service: Urology;  Laterality: Left;    CYSTOSCOPY W/ URETERAL STENT REMOVAL Left 5/29/2018    Procedure: CYSTOSCOPY, WITH URETERAL STENT REMOVAL;  Surgeon: Scooter Jin IV, MD;  Location: [facility] OR;  Service: Urology;  Laterality: Left;    CYSTOSCOPY WITH STENT EXCHANGE/RETROGRADE PYELOGRAM Left 4/2/2015    Performed by Scooter Jin IV, MD at [facility] OR    CYSTOSCOPY WITH STENT PLACEMENT Left 5/4/2017    Performed by Scooter Jin IV, MD at [facility] OR    CYSTOSCOPY WITH STENT PLACEMENT Left 4/28/2016    Performed by Scooter Jin IV, MD at [facility] OR    CYSTOSCOPY, WITH RETROGRADE PYELOGRAM Left 5/29/2018    Performed by Scooter Jin IV, MD at [facility] OR    CYSTOSCOPY, WITH URETERAL STENT INSERTION Left 5/29/2018    Performed by Scooter Jin IV, MD at [facility] OR    CYSTOSCOPY, WITH URETERAL STENT INSERTION Left 1/23/2014    Performed by Scooter Jin IV, MD at [facility] OR    CYSTOSCOPY, WITH URETERAL STENT REMOVAL Left 5/29/2018    Performed by Scooter Jin IV, MD at [facility] OR    EGD (ESOPHAGOGASTRODUODENOSCOPY) N/A 2/21/2014    Performed by Isaac MUSTAFA  "MD Flores at [facility] ENDO    ENDARTERECTOMY-CAROTID Left 8/18/2017    Performed by [facility] at [facility] OR    FOOT AMPUTATION THROUGH METATARSAL  1996    left    FOOT SURGERY Bilateral 1980's    per patient multiple toe amputations prior to.  partial foot amputation:first great toe then other toes     KIDNEY SURGERY  2014    per patient separation of horseshoe kidney @ time of AAA repair    LUNG LOBECTOMY Right 1970s    per patient not cancer    PYELOGRAM-RETROGRADE Left 5/4/2017    Performed by Scooter Jin IV, MD at [facility] OR    RESECTION-BOWEL N/A 3/16/2014    Performed by Stefan [facility] at [facility] OR    right below knee amputation  2009 (approx)    SMALL INTESTINE SURGERY  2014    per patient partial @ time of aaa repair  not small bowel - large bowel bowel compromised bythtwe AAAbowel    TONSILLECTOMY  1955 aprox    URETERAL STENT PLACEMENT Left     annually replaced since 2012 or so  Dr Jin       Review of Systems   Constitutional: Negative for chills, fatigue and fever.   HENT: Negative for hearing loss.    Eyes: Negative for photophobia and visual disturbance.   Respiratory: Negative for cough, chest tightness, shortness of breath and wheezing.    Cardiovascular: Negative for chest pain and palpitations.   Gastrointestinal: Negative for constipation, diarrhea, nausea and vomiting.   Endocrine: Negative for cold intolerance and heat intolerance.   Genitourinary: Negative for flank pain.   Musculoskeletal: Positive for arthralgias. Negative for neck pain and neck stiffness.   Skin: Positive for wound.   Neurological: Positive for numbness. Negative for light-headedness and headaches.   Psychiatric/Behavioral: Negative for sleep disturbance.          Objective:   BP (!) 146/62 (BP Location: Left arm, Patient Position: Sitting, BP Method: Large (Automatic))   Pulse 69   Resp 16   Ht 6' 1" (1.854 m)   BMI 27.05 kg/m²     LOWER EXTREMITY PHYSICAL EXAMINATION  NEUROLOGY: Sensation " to light touch is intact. Proprioception is intact, bilateral. Sensation to pin prick is reduced to absent. Vibratory sensation is diminished to the left and right lower extremity. Examination with 5.07 [place] monofilament reveals that protective sensation is not intact to the left and right plantar surfaces of the foot and digits, as the patient has no sensation/detection at greater than 4 distinct points of contact.    DERMATOLOGY:  Persistent ulceration; subcutaneous tissues fat layer exposure, distal plantar aspect left transmetatarsal amputation stump.  The wound measures approximately 4 mm x 4 mm x 1 mm.  There is moderate periwound hyperkeratosis noted. Edges are rolled.  Granulation tissue prior to, and post debridement.  There is no undermining or sinus tracts noted. No malodor, drainage, fluctuance, or abscess formation is noted. Palpable underlying scar tissues and the distal aspect of the remaining metatarsals.    ORTHOPEDIC:  Right lower extremity below-knee amputation.  Concerning left lower extremity, gastrocsoleus equinus contracture is noted. Left foot transmetatarsal amputation site.  Prominent palpable underlying residual metatarsal heads noted in the area of the ulceration on the left.    VASCULAR: On the left foot, the dorsalis pedis pulse is 0/4 and the posterior tibial pulse is 0/4. Capillary refill time is greater than 3 seconds. Hair growth is absent on the dorsum of the foot and at the digits. No rubor is present.  No elevation pallor is noted. Proximal to distal temperature is warm to warm.      Physical Exam    Assessment:     1. Chronic ulcer of left midfoot with fat layer exposed    2. Ulcer of foot with fat layer exposed, unspecified laterality - Left Foot    3. History of transmetatarsal amputation of left foot    4. 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 Idiopathic peripheral neuropathy    6. PVD (peripheral vascular disease)    7. CKD (chronic  kidney disease) stage 3, GFR 30-59 ml/min        Plan:     Chronic ulcer of left midfoot with fat layer exposed  Ulcer of foot with fat layer exposed, unspecified laterality - Left Foot  History of transmetatarsal amputation of left foot  A persistent ulceration to the plantar distal aspect of the left transmetatarsal amputation site likely due to a combination of peripheral neuropathy and peripheral vascular disease.  This ulcerations likely possibly due to equinus contracture with underlying scar tissues the periphery and beyond the ulceration.  The underlying residual metatarsal heads are also palpable, may be too prominent.  Once again I did discuss surgical intervention in the form of tendo-Achilles lengthening with possible further resection of distal aspect of metatarsals.  The patient and his wife state that they will think over this, as he does have a history of poor wound healing.  The skin allograft was denied by his insurance company.  For now continue every 3 days collagen based dressings.    The wound was surgically debrided after adequate prep with alcohol and/or betadine paint. Excisional wound debridement was performed using sharp #10/#15 blade and tissue nipper, with removal of all non-viable skin and soft tissues; necrotic skin/tissue formation. The woundbase/wound bed was also debrided to encourage bleeding as to promote/stimulate healing. Debridement was excisional and included epidermal, dermal and subcutaneous tissues. Post debridement measurements are as above. Hemostasis was achieved. Patient tolerated procedure well and without complications. Local woundcare with collagen dressings and bandage thereafter.     He may continue to ambulate with special orthopedic shoe with toe filler.    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    Idiopathic peripheral neuropathy  PVD (peripheral vascular disease)  CKD (chronic kidney disease) stage 3, GFR 30-59 ml/min  Patient advised to  follow up with Primary Care Physician for management of comorbid states.  Patient advised to follow up with Primary Care Provider and/or Cardiologist for management of comorbid cardiac states.  Patient advised to follow up with Nephrologist for management of kidney pathology.  Patient advised to follow up with Vascular Surgeon and/or Interventional Cardiologist for management of comorbid states.            Future Appointments   Date Time Provider Department Center   10/8/2018  3:30 PM Fercho Carlson DPM [facility]   6/3/2019  9:40 AM Scooter Jin IV, MD [facility] UROLOGY [facility]

## 2018-10-08 ENCOUNTER — OFFICE VISIT (OUTPATIENT)
Dept: PODIATRY | Facility: CLINIC | Age: 68
End: 2018-10-08
Payer: MEDICARE

## 2018-10-08 VITALS
RESPIRATION RATE: 16 BRPM | HEIGHT: 73 IN | HEART RATE: 72 BPM | SYSTOLIC BLOOD PRESSURE: 147 MMHG | DIASTOLIC BLOOD PRESSURE: 65 MMHG | BODY MASS INDEX: 27.05 KG/M2

## 2018-10-08 DIAGNOSIS — I73.9 PVD (PERIPHERAL VASCULAR DISEASE): ICD-10-CM

## 2018-10-08 DIAGNOSIS — N18.30 CKD (CHRONIC KIDNEY DISEASE) STAGE 3, GFR 30-59 ML/MIN: ICD-10-CM

## 2018-10-08 DIAGNOSIS — Z89.511 STATUS POST BELOW KNEE AMPUTATION OF RIGHT LOWER EXTREMITY: ICD-10-CM

## 2018-10-08 DIAGNOSIS — L97.422 CHRONIC ULCER OF LEFT MIDFOOT WITH FAT LAYER EXPOSED: Primary | ICD-10-CM

## 2018-10-08 DIAGNOSIS — Z89.432 HISTORY OF TRANSMETATARSAL AMPUTATION OF LEFT FOOT: ICD-10-CM

## 2018-10-08 DIAGNOSIS — G60.9 IDIOPATHIC PERIPHERAL NEUROPATHY: ICD-10-CM

## 2018-10-08 PROCEDURE — 99499 UNLISTED E&M SERVICE: CPT | Mod: S$PBB,,, | Performed by: PODIATRIST

## 2018-10-08 PROCEDURE — 99999 PR PBB SHADOW E&M-EST. PATIENT-LVL III: CPT | Mod: PBBFAC,,, | Performed by: PODIATRIST

## 2018-10-08 PROCEDURE — 99213 OFFICE O/P EST LOW 20 MIN: CPT | Mod: PBBFAC,25 | Performed by: PODIATRIST

## 2018-10-08 PROCEDURE — 11042 DBRDMT SUBQ TIS 1ST 20SQCM/<: CPT | Mod: S$PBB,,, | Performed by: PODIATRIST

## 2018-10-08 PROCEDURE — 11042 DBRDMT SUBQ TIS 1ST 20SQCM/<: CPT | Mod: PBBFAC | Performed by: PODIATRIST

## 2018-10-08 NOTE — PROGRESS NOTES
Subjective:       Patient ID: Kodi Ribeiro is a 68 y.o. male.    Chief Complaint: Wound Care (no c/o pain, wears socks, non-diabetic Pt, PCP Dr. Nuñez.)      HPI: Patient presents to the clinic today, for follow up and treatment concerning an ulceration to the plantar distal aspect left foot, transmetatarsal amputation site. Patient's Primary Care Provider is Norma Nuñez MD. The PMHx. does include PVD and peripheral neuropathy, as well as a contralateral below-knee amputation. Local woundcare product(s) most recently is/are collagen based dressing. Patient presents this afternoon with his wife who states that the ulceration is healing progressively.  Patient does ambulate nonweightbearing fashion with either a wheelchair or crutches.  Denies a smoking history.  Denies local or systemic signs infection at this time.    Review of patient's allergies indicates:   Allergen Reactions    Morphine Itching       Past Medical History:   Diagnosis Date    Anemia     Arthritis     Colon polyp     Repeat colonoscopy due in 9/14    Coronary artery disease     Diverticulosis     colonoscopy 2/21/2014    Encounter for blood transfusion     GERD (gastroesophageal reflux disease)     Hemorrhoids     colonoscopy 2/21/2014    Horseshoe kidney     Hyperglycemia 3/17/2014    Hyperlipidemia     Hypertension     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    Late complications of amputation stump     rseolved with further amputation( MRSA then none since 2014)    Lipoma of colon     colonoscopy 2/21/2014    Myocardial infarction     per patient 2000 & 9/2012    Peripheral vascular disease     Phantom limb syndrome     patient reports only intermittent not problematic, not worsening    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    Spinal cord disease     L4L5 disc    Stroke     Tobacco dependence     resolved    Ureteral stent retained        Family History   Problem Relation Age of Onset    Cancer Mother          lung    COPD Mother     Heart disease Father         MI but per patient bc of old age    Diabetes Daughter     Eczema Neg Hx     Lupus Neg Hx     Psoriasis Neg Hx     Melanoma Neg Hx     Kidney disease Neg Hx     Stroke Neg Hx     Mental illness Neg Hx     Mental retardation Neg Hx     Hypertension Neg Hx     Hyperlipidemia Neg Hx     Drug abuse Neg Hx     Alcohol abuse Neg Hx     Depression Neg Hx        Social History     Socioeconomic History    Marital status:      Spouse name: Laury    Number of children: 2    Years of education: Not on file    Highest education level: Not on file   Social Needs    Financial resource strain: Not on file    Food insecurity - worry: Not on file    Food insecurity - inability: Not on file    Transportation needs - medical: Not on file    Transportation needs - non-medical: Not on file   Occupational History    Occupation: Retired      Comment: [employer]   Tobacco Use    Smoking status: Former Smoker     Packs/day: 1.00     Years: 15.00     Pack years: 15.00     Last attempt to quit: 2009     Years since quittin.7    Smokeless tobacco: Never Used   Substance and Sexual Activity    Alcohol use: No    Drug use: No     Comment: Is on prescription opiod, no non prescribed use    Sexual activity: Not Currently     Partners: Female   Other Topics Concern    Not on file   Social History Narrative     . 4 children alive and well. 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  Still drives. Does not have a Living Will.       Past Surgical History:   Procedure Laterality Date    ABDOMINAL AORTIC ANEURYSM REPAIR      ABDOMINAL AORTIC ANEURYSM REPAIR      AMPUTATION, LOWER LIMB      AORTA - BILATERAL FEMORAL ARTERY BYPASS GRAFT      Left and right leg    COLONOSCOPY N/A 2014    [facility]    CORONARY ANGIOPLASTY WITH  STENT PLACEMENT  2000    Three placed in heart    CREATION, BYPASS, ARTERIAL, AORTA TO FEMORAL, BILATERAL Right 3/16/2014    Performed by Stefan Jamil MD at [facility] OR    CYSTOSCOPY W/ RETROGRADES Left 5/29/2018    Procedure: CYSTOSCOPY, WITH RETROGRADE PYELOGRAM;  Surgeon: Scooter Jin IV, MD;  Location: [facility] OR;  Service: Urology;  Laterality: Left;    CYSTOSCOPY W/ URETERAL STENT PLACEMENT Left 5/29/2018    Procedure: CYSTOSCOPY, WITH URETERAL STENT INSERTION;  Surgeon: Scooter Jin IV, MD;  Location: [facility] OR;  Service: Urology;  Laterality: Left;    CYSTOSCOPY W/ URETERAL STENT REMOVAL Left 5/29/2018    Procedure: CYSTOSCOPY, WITH URETERAL STENT REMOVAL;  Surgeon: Scooter Jin IV, MD;  Location: [facility] OR;  Service: Urology;  Laterality: Left;    CYSTOSCOPY WITH STENT EXCHANGE/RETROGRADE PYELOGRAM Left 4/2/2015    Performed by Scooter Jin IV, MD at [facility] OR    CYSTOSCOPY WITH STENT PLACEMENT Left 5/4/2017    Performed by Scooter Jin IV, MD at [facility] OR    CYSTOSCOPY WITH STENT PLACEMENT Left 4/28/2016    Performed by Scooter Jin IV, MD at [facility] OR    CYSTOSCOPY, WITH RETROGRADE PYELOGRAM Left 5/29/2018    Performed by Scooter Jin IV, MD at [facility] OR    CYSTOSCOPY, WITH URETERAL STENT INSERTION Left 5/29/2018    Performed by Scooter Jin IV, MD at [facility] OR    CYSTOSCOPY, WITH URETERAL STENT INSERTION Left 1/23/2014    Performed by Scooter Jin IV, MD at [facility] OR    CYSTOSCOPY, WITH URETERAL STENT REMOVAL Left 5/29/2018    Performed by Scooter Jin IV, MD at [facility] OR    EGD (ESOPHAGOGASTRODUODENOSCOPY) N/A 2/21/2014    Performed by Isaac Tanner MD at [facility] ENDO    ENDARTERECTOMY-CAROTID Left 8/18/2017    Performed by Stefan Jamil MD at [facility] OR    FOOT AMPUTATION THROUGH METATARSAL  1996    left    FOOT SURGERY Bilateral 1980's    per patient multiple toe amputations prior to.  partial foot amputation:first great toe then other toes     KIDNEY  "SURGERY  2014    per patient separation of horseshoe kidney @ time of AAA repair    LUNG LOBECTOMY Right 1970s    per patient not cancer    PYELOGRAM-RETROGRADE Left 5/4/2017    [facility] Sc[facility] OR    RESECTION-BOWEL N/A 3/16/2014    Performed by Stefan [facility]il [facility] at [facility] OR    right below knee amputation  2009 (approx)    SMALL INTESTINE SURGERY  2014    per patient partial @ time of aaa repair  not small bowel - large bowel bowel compromised bythtwe AAAbowel    TONSILLECTOMY  1955 aprox    URETERAL STENT PLACEMENT Left     annually replaced since 2012 or so  Dr Jin       Review of Systems   Constitutional: Negative for chills, fatigue and fever.   HENT: Negative for hearing loss.    Eyes: Negative for photophobia and visual disturbance.   Respiratory: Negative for cough, chest tightness, shortness of breath and wheezing.    Cardiovascular: Negative for chest pain, palpitations and leg swelling.   Gastrointestinal: Negative for constipation, diarrhea, nausea and vomiting.   Endocrine: Negative for cold intolerance and heat intolerance.   Genitourinary: Negative for flank pain.   Musculoskeletal: Positive for gait problem. Negative for neck pain and neck stiffness.   Skin: Positive for color change and wound.   Neurological: Positive for numbness. Negative for light-headedness and headaches.   Psychiatric/Behavioral: Negative for sleep disturbance.          Objective:   BP (!) 147/65 (BP Location: Left arm, Patient Position: Sitting, BP Method: Large (Automatic))   Pulse 72   Resp 16   Ht 6' 1" (1.854 m)   BMI 27.05 kg/m²       LOWER EXTREMITY PHYSICAL EXAMINATION    Physical Exam    DERMATOLOGY:  Persistent ulceration, plantar distal aspect left transmetatarsal amputation site.  Wound is greatly improved as compared to prior and measures approximately 3 mm x 2 mm x 1 mm.  Granulation tissues are noted deep within the wound.  No probe to bone or osseous exposure is " noted. No periwound erythema, cellulitis or hyperkeratosis noted. No fluctuance or drainage is noted. No abscess formation.  Palpable underlying fibrosis and prominent metatarsal stump remnants are noted.    VASCULAR: On the left foot, the dorsalis pedis pulse is 0/4 and the posterior tibial pulse is 0/4. Capillary refill time is greater than 3 seconds. Hair growth is absent on the dorsum of the foot and at the digits. No rubor is present. Proximal to distal temperature is warm to warm.    ORTHOPEDIC:  Right below-knee amputation.  Left lower extremity transmetatarsal amputation.    NEUROLOGY: Protective sensation is not intact to the left plantar surfaces of the foot and digits, as the patient has no sensation/detection at greater than 4 distinct points of contact with 5.07 [place] monofilament. Sensation to light touch is intact on the right foot. Proprioception is intact. Sensation to pin prick is reduced to absent. Vibratory sensation is diminished.    Assessment:     1. Chronic ulcer of left midfoot with fat layer exposed    2. History of transmetatarsal amputation of left foot    3. Idiopathic peripheral neuropathy    4. PVD (peripheral vascular disease)    5. CKD (chronic kidney disease) stage 3, GFR 30-59 ml/min    6. 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        Plan:     Chronic ulcer of left midfoot with fat layer exposed  History of transmetatarsal amputation of left foot  Idiopathic peripheral neuropathy  PVD (peripheral vascular disease)  Thorough discussion is had with the patient today, concerning the diagnosis, its etiology, and the treatment algorithm at present.  Patient's past medical history is thoroughly reviewed today, in light of the fact that surgical intervention is discussed/planned/initiated.  Most recent labs reviewed in detail with the patient. All questions and concerns regarding findings and its/their implications are outlined and discussed.     The wound  was surgically debrided after adequate prep with alcohol and/or betadine paint. Excisional wound debridement was performed using sharp #10/#15 blade and tissue nipper, with removal of all non-viable skin and soft tissues; necrotic skin/tissue formation. The woundbase/wound bed was also debrided to encourage bleeding as to promote/stimulate healing. Debridement was excisional and included epidermal, dermal and subcutaneous tissues. Post debridement measurements are as above. Hemostasis was achieved. Patient tolerated procedure well and without complications. Local woundcare with collagen dressings and bandage thereafter.     Continue surgical shoe for ambulation offloading.  Weight-bearing as tolerated.  Follow up in approximately 3 weeks.  I do anticipate wound closure at that time.       CKD (chronic kidney disease) stage 3, GFR 30-59 ml/min  Patient advised to follow up with Nephrologist for management of kidney pathology.    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            Future Appointments   Date Time Provider Department Center   11/1/2018  1:15 PM Fercho Carlson DPM ON POD  [facility]   6/3/2019  9:40 AM Scooter Jin IV, MD ON UROLOGY [facility]

## 2018-11-06 ENCOUNTER — HOSPITAL ENCOUNTER (OUTPATIENT)
Dept: RADIOLOGY | Facility: HOSPITAL | Age: 68
Discharge: HOME | End: 2018-11-06
Attending: FAMILY MEDICINE
Payer: MEDICARE

## 2018-11-06 ENCOUNTER — OFFICE VISIT (OUTPATIENT)
Dept: FAMILY MEDICINE | Facility: CLINIC | Age: 68
End: 2018-11-06
Payer: MEDICARE

## 2018-11-06 VITALS
WEIGHT: 197.56 LBS | HEIGHT: 73 IN | TEMPERATURE: 99 F | OXYGEN SATURATION: 98 % | SYSTOLIC BLOOD PRESSURE: 162 MMHG | HEART RATE: 86 BPM | DIASTOLIC BLOOD PRESSURE: 68 MMHG | BODY MASS INDEX: 26.18 KG/M2

## 2018-11-06 DIAGNOSIS — R07.9 ACUTE CHEST PAIN: ICD-10-CM

## 2018-11-06 DIAGNOSIS — R07.89 OTHER CHEST PAIN: ICD-10-CM

## 2018-11-06 DIAGNOSIS — I10 ESSENTIAL HYPERTENSION: Chronic | ICD-10-CM

## 2018-11-06 DIAGNOSIS — R07.89 OTHER CHEST PAIN: Primary | ICD-10-CM

## 2018-11-06 DIAGNOSIS — L02.92 BOIL: ICD-10-CM

## 2018-11-06 PROCEDURE — G0008 ADMIN INFLUENZA VIRUS VAC: HCPCS | Mod: HCNC,S$GLB,, | Performed by: FAMILY MEDICINE

## 2018-11-06 PROCEDURE — 99214 OFFICE O/P EST MOD 30 MIN: CPT | Mod: 25,HCNC,S$GLB, | Performed by: FAMILY MEDICINE

## 2018-11-06 PROCEDURE — 71046 X-RAY EXAM CHEST 2 VIEWS: CPT | Mod: 26,HCNC,, | Performed by: RADIOLOGY

## 2018-11-06 PROCEDURE — 3078F DIAST BP <80 MM HG: CPT | Mod: CPTII,HCNC,S$GLB, | Performed by: FAMILY MEDICINE

## 2018-11-06 PROCEDURE — 90662 IIV NO PRSV INCREASED AG IM: CPT | Mod: HCNC,S$GLB,, | Performed by: FAMILY MEDICINE

## 2018-11-06 PROCEDURE — 3077F SYST BP >= 140 MM HG: CPT | Mod: CPTII,HCNC,S$GLB, | Performed by: FAMILY MEDICINE

## 2018-11-06 PROCEDURE — 99999 PR PBB SHADOW E&M-EST. PATIENT-LVL V: CPT | Mod: PBBFAC,HCNC,, | Performed by: FAMILY MEDICINE

## 2018-11-06 PROCEDURE — 87070 CULTURE OTHR SPECIMN AEROBIC: CPT | Mod: HCNC

## 2018-11-06 PROCEDURE — 87186 SC STD MICRODIL/AGAR DIL: CPT | Mod: HCNC

## 2018-11-06 PROCEDURE — 1101F PT FALLS ASSESS-DOCD LE1/YR: CPT | Mod: CPTII,HCNC,S$GLB, | Performed by: FAMILY MEDICINE

## 2018-11-06 PROCEDURE — 87077 CULTURE AEROBIC IDENTIFY: CPT | Mod: HCNC

## 2018-11-06 PROCEDURE — 71046 X-RAY EXAM CHEST 2 VIEWS: CPT | Mod: TC,FY,HCNC,PO

## 2018-11-06 RX ORDER — SULFAMETHOXAZOLE AND TRIMETHOPRIM 800; 160 MG/1; MG/1
1 TABLET ORAL 2 TIMES DAILY
Qty: 14 TABLET | Refills: 0 | Status: SHIPPED | OUTPATIENT
Start: 2018-11-06 | End: 2018-11-13

## 2018-11-06 RX ORDER — ISOSORBIDE MONONITRATE 30 MG/1
30 TABLET, EXTENDED RELEASE ORAL DAILY
Qty: 30 TABLET | Refills: 11 | Status: SHIPPED | OUTPATIENT
Start: 2018-11-06 | End: 2018-12-07 | Stop reason: ALTCHOICE

## 2018-11-06 RX ORDER — NITROGLYCERIN 0.6 MG/1
0.6 TABLET SUBLINGUAL EVERY 5 MIN PRN
Qty: 30 TABLET | Refills: 1 | Status: SHIPPED | OUTPATIENT
Start: 2018-11-06 | End: 2020-05-21 | Stop reason: SDUPTHER

## 2018-11-06 NOTE — PROGRESS NOTES
Chief Complaint:    Chief Complaint   Patient presents with    Chest Pain       History of Present Illness:    Patient presents today he says over the last several weeks he has been having chest pain mostly associated with exertion it radiates to his neck no diaphoresis.  He has coronary artery disease and he had a stent placed many years back.  Denies any shortness of breath or fever cough.    He has a boil on the right arm meds been painful had some pus.    His blood pressure is also elevated he said it is running high at home.    ROS:  Review of Systems   Constitutional: Negative for activity change, chills, fatigue, fever and unexpected weight change.   HENT: Negative for congestion, ear discharge, ear pain, hearing loss, postnasal drip and rhinorrhea.    Eyes: Negative for pain and visual disturbance.   Respiratory: Negative for cough, chest tightness and shortness of breath.    Cardiovascular: Positive for chest pain. Negative for palpitations.   Gastrointestinal: Negative for abdominal pain, diarrhea and vomiting.   Endocrine: Negative for heat intolerance.   Genitourinary: Negative for dysuria, flank pain, frequency and hematuria.   Musculoskeletal: Negative for back pain, gait problem and neck pain.   Skin: Negative for color change and rash.   Neurological: Negative for dizziness, tremors, seizures, numbness and headaches.   Psychiatric/Behavioral: Negative for agitation, hallucinations, self-injury, sleep disturbance and suicidal ideas. The patient is not nervous/anxious.        Past Medical History:   Diagnosis Date    Anemia     Arthritis     Colon polyp     Repeat colonoscopy due in 9/14    Coronary artery disease     Diverticulosis     colonoscopy 2/21/2014    Encounter for blood transfusion     GERD (gastroesophageal reflux disease)     Hemorrhoids     colonoscopy 2/21/2014    Horseshoe kidney     Hyperglycemia 3/17/2014    Hyperlipidemia     Hypertension     Infection of aortic graft  3/14/2014    Late complications of amputation stump     rseolved with further amputation( MRSA then none since )    Lipoma of colon     colonoscopy 2014    Myocardial infarction     per patient  & 2012    Peripheral vascular disease     Phantom limb syndrome     patient reports only intermittent not problematic, not worsening    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    Spinal cord disease     L4L5 disc    Stroke     Tobacco dependence     resolved    Ureteral stent retained        Social History:  Social History     Socioeconomic History    Marital status:      Spouse name: Laury    Number of children: 2    Years of education: None    Highest education level: None   Social Needs    Financial resource strain: None    Food insecurity - worry: None    Food insecurity - inability: None    Transportation needs - medical: None    Transportation needs - non-medical: None   Occupational History    Occupation: Retired      Comment: [employer]   Tobacco Use    Smoking status: Former Smoker     Packs/day: 1.00     Years: 15.00     Pack years: 15.00     Last attempt to quit: 2009     Years since quittin.8    Smokeless tobacco: Never Used   Substance and Sexual Activity    Alcohol use: No    Drug use: No     Comment: Is on prescription opiod, no non prescribed use    Sexual activity: Not Currently     Partners: Female   Other Topics Concern    None   Social History Narrative     . 4 children alive and well. 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  Still drives. Does not have a Living Will.       Family History:   family history includes COPD in his mother; Cancer in his mother; Diabetes in his daughter; Heart disease in his father.    Health Maintenance   Topic Date Due    Influenza Vaccine  2018    Lipid Panel  10/23/2018    Colonoscopy  2019    TETANUS VACCINE  2024     "Hepatitis C Screening  Completed    Zoster Vaccine  Completed    Pneumococcal (65+)  Completed    Abdominal Aortic Aneurysm Screening  Addressed       Physical Exam:    Vital Signs  Temp: 99.2 °F (37.3 °C)  Temp src: Tympanic  Pulse: 86  SpO2: 98 %  BP: (!) 162/68  BP Location: Left arm  Patient Position: Sitting  Pain Score: 0-No pain  Height and Weight  Height: 6' 1" (185.4 cm)  Weight: 89.6 kg (197 lb 8.5 oz)  BSA (Calculated - sq m): 2.15 sq meters  BMI (Calculated): 26.1  Weight in (lb) to have BMI = 25: 189.1]    Body mass index is 26.06 kg/m².    Physical Exam   Constitutional: He is oriented to person, place, and time. He appears well-developed.   HENT:   Mouth/Throat: Oropharynx is clear and moist.   Eyes: Conjunctivae are normal. Pupils are equal, round, and reactive to light.   Neck: Normal range of motion. Neck supple.   Cardiovascular: Normal rate, regular rhythm and normal heart sounds.   No murmur heard.  Pulmonary/Chest: Effort normal and breath sounds normal. No respiratory distress. He has no wheezes. He has no rales. He exhibits no tenderness.   Abdominal: Soft. He exhibits no distension and no mass. There is no tenderness. There is no guarding.   Musculoskeletal: He exhibits no edema or tenderness.        Arms:  Lymphadenopathy:     He has no cervical adenopathy.   Neurological: He is alert and oriented to person, place, and time. He has normal reflexes.   Skin: Skin is warm and dry.   Psychiatric: He has a normal mood and affect. His behavior is normal. Judgment and thought content normal.         Assessment:      ICD-10-CM ICD-9-CM   1. Other chest pain R07.89 786.59   2. Acute chest pain R07.9 786.50   3. Essential hypertension I10 401.9   4. Boil L02.92 680.9         Plan:    Will schedule nuclear stress test for in a sap patient will start taking nitroglycerin sublingual as needed for chest pain also take a baby aspirin until the stress test is done.  If the chest pain does not resolve " with nitroglycerin please go to the ED  Will also add Imdur 30 mg daily.  Monitor blood pressure carefully  Patient will be started on Bactrim culture sent  Follow-up 2 weeks bring blood pressure readings.      Orders Placed This Encounter   Procedures    CULTURE, AEROBIC  (SPECIFY SOURCE)    NM Myocardial Perfusion Spect Multi Pharmacologic    X-Ray Chest PA And Lateral    NM Multi Pharm Stress Cardiac Component       Current Outpatient Medications   Medication Sig Dispense Refill    amLODIPine (NORVASC) 10 MG tablet TAKE 1 TABLET EVERY DAY 90 tablet 3    carvedilol (COREG) 6.25 MG tablet TAKE 1 TABLET TWICE DAILY 180 tablet 3    clopidogrel (PLAVIX) 75 mg tablet TAKE 1 TABLET EVERY DAY 90 tablet 3    docusate sodium (COLACE) 100 MG capsule Take 1 capsule (100 mg total) by mouth 2 (two) times daily. 60 capsule 0    docusate sodium (COLACE) 100 MG capsule Take 1 capsule (100 mg total) by mouth 2 (two) times daily. 60 capsule 0    losartan (COZAAR) 100 MG tablet TAKE 1 TABLET ONE TIME DAILY 90 tablet 3    omeprazole (PRILOSEC) 20 MG capsule TAKE 1 CAPSULE TWICE DAILY 180 capsule 3    oxyCODONE-acetaminophen (PERCOCET) 5-325 mg per tablet Take 1-2 tablets by mouth every 4 (four) hours as needed for Pain. 30 tablet 0    pravastatin (PRAVACHOL) 80 MG tablet TAKE 1 TABLET EVERY EVENING 90 tablet 3    isosorbide mononitrate (IMDUR) 30 MG 24 hr tablet Take 1 tablet (30 mg total) by mouth once daily. 30 tablet 11    nitroglycerin (NITROSTAT) 0.6 MG Subl Place 1 tablet (0.6 mg total) under the tongue every 5 (five) minutes as needed (max 3/ per episode). 30 tablet 1    sulfamethoxazole-trimethoprim 800-160mg (BACTRIM DS) 800-160 mg Tab Take 1 tablet by mouth 2 (two) times daily. for 7 days 14 tablet 0    triamcinolone acetonide 0.025% (KENALOG) 0.025 % cream Apply topically 2 (two) times daily. 15 g 0     No current facility-administered medications for this visit.        There are no discontinued  medications.    No Follow-up on file.      Norma Nuñez MD

## 2018-11-09 LAB — BACTERIA SPEC AEROBE CULT: NORMAL

## 2018-11-15 ENCOUNTER — HOSPITAL ENCOUNTER (OUTPATIENT)
Dept: RADIOLOGY | Facility: HOSPITAL | Age: 68
Discharge: HOME | End: 2018-11-15
Attending: FAMILY MEDICINE
Payer: MEDICARE

## 2018-11-15 ENCOUNTER — HOSPITAL ENCOUNTER (OUTPATIENT)
Dept: PULMONOLOGY | Facility: HOSPITAL | Age: 68
Discharge: HOME | End: 2018-11-15
Attending: FAMILY MEDICINE
Payer: MEDICARE

## 2018-11-15 DIAGNOSIS — R07.9 ACUTE CHEST PAIN: ICD-10-CM

## 2018-11-15 LAB — DIASTOLIC DYSFUNCTION: NO

## 2018-11-15 PROCEDURE — 78452 HT MUSCLE IMAGE SPECT MULT: CPT | Mod: 26,HCNC,, | Performed by: INTERNAL MEDICINE

## 2018-11-15 PROCEDURE — 93016 CV STRESS TEST SUPVJ ONLY: CPT | Mod: HCNC,,, | Performed by: INTERNAL MEDICINE

## 2018-11-15 PROCEDURE — 63600175 PHARM REV CODE 636 W HCPCS: Mod: HCNC | Performed by: NURSE PRACTITIONER

## 2018-11-15 PROCEDURE — 93018 CV STRESS TEST I&R ONLY: CPT | Mod: HCNC,,, | Performed by: INTERNAL MEDICINE

## 2018-11-15 PROCEDURE — 78452 HT MUSCLE IMAGE SPECT MULT: CPT | Mod: TC,HCNC

## 2018-11-15 RX ORDER — REGADENOSON 0.08 MG/ML
0.4 INJECTION, SOLUTION INTRAVENOUS ONCE
Status: COMPLETED | OUTPATIENT
Start: 2018-11-15 | End: 2018-11-15

## 2018-11-15 RX ADMIN — REGADENOSON 0.4 MG: 0.08 INJECTION, SOLUTION INTRAVENOUS at 10:11

## 2018-11-20 ENCOUNTER — OFFICE VISIT (OUTPATIENT)
Dept: FAMILY MEDICINE | Facility: CLINIC | Age: 68
End: 2018-11-20
Payer: MEDICARE

## 2018-11-20 VITALS
BODY MASS INDEX: 25.15 KG/M2 | HEART RATE: 73 BPM | SYSTOLIC BLOOD PRESSURE: 138 MMHG | OXYGEN SATURATION: 95 % | HEIGHT: 74 IN | WEIGHT: 196 LBS | TEMPERATURE: 98 F | DIASTOLIC BLOOD PRESSURE: 60 MMHG

## 2018-11-20 DIAGNOSIS — I10 HTN, GOAL BELOW 140/90: ICD-10-CM

## 2018-11-20 DIAGNOSIS — R07.9 CHEST PAIN ON EXERTION: Primary | ICD-10-CM

## 2018-11-20 DIAGNOSIS — I25.2 OLD MI (MYOCARDIAL INFARCTION): Chronic | ICD-10-CM

## 2018-11-20 PROCEDURE — 1101F PT FALLS ASSESS-DOCD LE1/YR: CPT | Mod: CPTII,S$GLB,, | Performed by: FAMILY MEDICINE

## 2018-11-20 PROCEDURE — 3078F DIAST BP <80 MM HG: CPT | Mod: CPTII,S$GLB,, | Performed by: FAMILY MEDICINE

## 2018-11-20 PROCEDURE — 99214 OFFICE O/P EST MOD 30 MIN: CPT | Mod: S$GLB,,, | Performed by: FAMILY MEDICINE

## 2018-11-20 PROCEDURE — 3075F SYST BP GE 130 - 139MM HG: CPT | Mod: CPTII,S$GLB,, | Performed by: FAMILY MEDICINE

## 2018-11-20 PROCEDURE — 99999 PR PBB SHADOW E&M-EST. PATIENT-LVL V: CPT | Mod: PBBFAC,HCNC,, | Performed by: FAMILY MEDICINE

## 2018-11-20 PROCEDURE — 99499 UNLISTED E&M SERVICE: CPT | Mod: HCNC,S$GLB,, | Performed by: FAMILY MEDICINE

## 2018-11-20 NOTE — PROGRESS NOTES
Chief Complaint:    Chief Complaint   Patient presents with    Follow-up       History of Present Illness:    Patient presents today he has had nuclear stress test which came back normal he says he still gets chest pain mostly when he gets up from a sitting position and the knee is eases up in the next time when he moves he does not get the pain until possibly the next day.  His chest x-ray was normal.  He does have coronary artery disease history of stent placement    Patient's blood pressure is elevated today in the clinic but he says at home his pressure is okay.  Mostly under 140 systolic and the diastolic is under 90.    ROS:  Review of Systems   Constitutional: Negative for activity change, chills, fatigue, fever and unexpected weight change.   HENT: Negative for congestion, ear discharge, ear pain, hearing loss, postnasal drip and rhinorrhea.    Eyes: Negative for pain and visual disturbance.   Respiratory: Negative for cough, chest tightness and shortness of breath.    Cardiovascular: Positive for chest pain. Negative for palpitations.   Gastrointestinal: Negative for abdominal pain, diarrhea and vomiting.   Endocrine: Negative for heat intolerance.   Genitourinary: Negative for dysuria, flank pain, frequency and hematuria.   Musculoskeletal: Negative for back pain, gait problem and neck pain.   Skin: Negative for color change and rash.   Neurological: Negative for dizziness, tremors, seizures, numbness and headaches.   Psychiatric/Behavioral: Negative for agitation, hallucinations, self-injury, sleep disturbance and suicidal ideas. The patient is not nervous/anxious.        Past Medical History:   Diagnosis Date    Anemia     Arthritis     Colon polyp     Repeat colonoscopy due in 9/14    Coronary artery disease     Diverticulosis     colonoscopy 2/21/2014    Encounter for blood transfusion     GERD (gastroesophageal reflux disease)     Hemorrhoids     colonoscopy 2/21/2014    Horseshoe kidney      Hyperglycemia 3/17/2014    Hyperlipidemia     Hypertension     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    Late complications of amputation stump     rseolved with further amputation( MRSA then none since )    Lipoma of colon     colonoscopy 2014    Myocardial infarction     per patient  & 2012    Peripheral vascular disease     Phantom limb syndrome     patient reports only intermittent not problematic, not worsening    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    Spinal cord disease     L4L5 disc    Stroke     Tobacco dependence     resolved    Ureteral stent retained        Social History:  Social History     Socioeconomic History    Marital status:      Spouse name: Laury    Number of children: 2    Years of education: None    Highest education level: None   Social Needs    Financial resource strain: None    Food insecurity - worry: None    Food insecurity - inability: None    Transportation needs - medical: None    Transportation needs - non-medical: None   Occupational History    Occupation: Retired      Comment: [employer]   Tobacco Use    Smoking status: Former Smoker     Packs/day: 1.00     Years: 15.00     Pack years: 15.00     Last attempt to quit: 2009     Years since quittin.8    Smokeless tobacco: Never Used   Substance and Sexual Activity    Alcohol use: No    Drug use: No     Comment: Is on prescription opiod, no non prescribed use    Sexual activity: Not Currently     Partners: Female   Other Topics Concern    None   Social History Narrative     . 4 children alive and well. 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  Still drives. Does not have a Living Will.       Family History:   family history includes COPD in his mother; Cancer in his mother; Diabetes in his daughter; Heart disease in his father.    Health Maintenance   Topic Date Due    Lipid Panel  10/23/2018     "Colonoscopy  02/21/2019    TETANUS VACCINE  12/04/2024    Hepatitis C Screening  Completed    Zoster Vaccine  Completed    Pneumococcal (65+)  Completed    Influenza Vaccine  Completed    Abdominal Aortic Aneurysm Screening  Addressed       Physical Exam:    Vital Signs  Temp: 97.9 °F (36.6 °C)  Temp src: Tympanic  Pulse: 73  SpO2: 95 %  BP: 138/60  BP Location: Left arm  Patient Position: Sitting  Pain Score: 0-No pain  Height and Weight  Height: 6' 2" (188 cm)  Weight: 88.9 kg (195 lb 15.8 oz)  BSA (Calculated - sq m): 2.15 sq meters  BMI (Calculated): 25.2  Weight in (lb) to have BMI = 25: 194.3]    Body mass index is 25.16 kg/m².    Physical Exam   Constitutional: He is oriented to person, place, and time. He appears well-developed.   HENT:   Mouth/Throat: Oropharynx is clear and moist.   Eyes: Conjunctivae are normal. Pupils are equal, round, and reactive to light.   Neck: Normal range of motion. Neck supple.   Cardiovascular: Normal rate, regular rhythm and normal heart sounds.   No murmur heard.  Pulmonary/Chest: Effort normal and breath sounds normal. No respiratory distress. He has no wheezes. He has no rales. He exhibits no tenderness.   Abdominal: Soft. He exhibits no distension and no mass. There is no tenderness. There is no guarding.   Musculoskeletal: He exhibits no edema or tenderness.        Arms:  Lymphadenopathy:     He has no cervical adenopathy.   Neurological: He is alert and oriented to person, place, and time. He has normal reflexes.   Skin: Skin is warm and dry.   Psychiatric: He has a normal mood and affect. His behavior is normal. Judgment and thought content normal.         Assessment:      ICD-10-CM ICD-9-CM   1. Chest pain on exertion R07.9 786.50   2. HTN, goal below 140/90 I10 401.9   3. CAD;Old MI ; s/p stents x 3 (2000)  I25.2 412         Plan:    We discussed about the margin of error with a nuclear stress test, the fact that the pain is more related to exertion is " bothersome.  Especially with his history of heart disease.  Will refer him to Dr. Boone to see if he needs a heart catheterization   Follow-up 2 weeks bring blood pressure readings.      Orders Placed This Encounter   Procedures    Ambulatory referral to Cardiology       Current Outpatient Medications   Medication Sig Dispense Refill    amLODIPine (NORVASC) 10 MG tablet TAKE 1 TABLET EVERY DAY 90 tablet 3    carvedilol (COREG) 6.25 MG tablet TAKE 1 TABLET TWICE DAILY 180 tablet 3    clopidogrel (PLAVIX) 75 mg tablet TAKE 1 TABLET EVERY DAY 90 tablet 3    docusate sodium (COLACE) 100 MG capsule Take 1 capsule (100 mg total) by mouth 2 (two) times daily. 60 capsule 0    docusate sodium (COLACE) 100 MG capsule Take 1 capsule (100 mg total) by mouth 2 (two) times daily. 60 capsule 0    isosorbide mononitrate (IMDUR) 30 MG 24 hr tablet Take 1 tablet (30 mg total) by mouth once daily. 30 tablet 11    losartan (COZAAR) 100 MG tablet TAKE 1 TABLET ONE TIME DAILY 90 tablet 3    nitroglycerin (NITROSTAT) 0.6 MG Subl Place 1 tablet (0.6 mg total) under the tongue every 5 (five) minutes as needed (max 3/ per episode). 30 tablet 1    omeprazole (PRILOSEC) 20 MG capsule TAKE 1 CAPSULE TWICE DAILY 180 capsule 3    oxyCODONE-acetaminophen (PERCOCET) 5-325 mg per tablet Take 1-2 tablets by mouth every 4 (four) hours as needed for Pain. 30 tablet 0    pravastatin (PRAVACHOL) 80 MG tablet TAKE 1 TABLET EVERY EVENING 90 tablet 3    triamcinolone acetonide 0.025% (KENALOG) 0.025 % cream Apply topically 2 (two) times daily. 15 g 0     No current facility-administered medications for this visit.        There are no discontinued medications.    No Follow-up on file.      Norma Nuñez MD

## 2018-12-07 ENCOUNTER — OFFICE VISIT (OUTPATIENT)
Dept: CARDIOLOGY | Facility: CLINIC | Age: 68
End: 2018-12-07
Payer: MEDICARE

## 2018-12-07 VITALS
SYSTOLIC BLOOD PRESSURE: 150 MMHG | BODY MASS INDEX: 25.41 KG/M2 | WEIGHT: 198 LBS | HEIGHT: 74 IN | HEART RATE: 72 BPM | DIASTOLIC BLOOD PRESSURE: 62 MMHG

## 2018-12-07 DIAGNOSIS — I10 ESSENTIAL HYPERTENSION: Chronic | ICD-10-CM

## 2018-12-07 DIAGNOSIS — R07.89 ATYPICAL CHEST PAIN: Primary | ICD-10-CM

## 2018-12-07 DIAGNOSIS — I35.0 AORTIC VALVE STENOSIS, ETIOLOGY OF CARDIAC VALVE DISEASE UNSPECIFIED: Chronic | ICD-10-CM

## 2018-12-07 DIAGNOSIS — I65.22 LEFT CAROTID ARTERY STENOSIS: ICD-10-CM

## 2018-12-07 DIAGNOSIS — Z89.511 STATUS POST BELOW KNEE AMPUTATION OF RIGHT LOWER EXTREMITY: Chronic | ICD-10-CM

## 2018-12-07 DIAGNOSIS — I63.412 CEREBRAL INFARCTION DUE TO EMBOLISM OF LEFT MIDDLE CEREBRAL ARTERY: ICD-10-CM

## 2018-12-07 DIAGNOSIS — I73.9 PVD (PERIPHERAL VASCULAR DISEASE): Chronic | ICD-10-CM

## 2018-12-07 DIAGNOSIS — I25.2 OLD MI (MYOCARDIAL INFARCTION): Chronic | ICD-10-CM

## 2018-12-07 PROCEDURE — 3077F SYST BP >= 140 MM HG: CPT | Mod: CPTII,HCNC,S$GLB, | Performed by: INTERNAL MEDICINE

## 2018-12-07 PROCEDURE — 1101F PT FALLS ASSESS-DOCD LE1/YR: CPT | Mod: CPTII,HCNC,S$GLB, | Performed by: INTERNAL MEDICINE

## 2018-12-07 PROCEDURE — 3078F DIAST BP <80 MM HG: CPT | Mod: CPTII,HCNC,S$GLB, | Performed by: INTERNAL MEDICINE

## 2018-12-07 PROCEDURE — 99999 PR PBB SHADOW E&M-EST. PATIENT-LVL III: CPT | Mod: PBBFAC,HCNC,, | Performed by: INTERNAL MEDICINE

## 2018-12-07 PROCEDURE — 99499 UNLISTED E&M SERVICE: CPT | Mod: S$GLB,,, | Performed by: INTERNAL MEDICINE

## 2018-12-07 PROCEDURE — 99204 OFFICE O/P NEW MOD 45 MIN: CPT | Mod: HCNC,S$GLB,, | Performed by: INTERNAL MEDICINE

## 2018-12-07 RX ORDER — PANTOPRAZOLE SODIUM 40 MG/1
40 TABLET, DELAYED RELEASE ORAL DAILY
Qty: 30 TABLET | Refills: 11 | Status: SHIPPED | OUTPATIENT
Start: 2018-12-07 | End: 2020-01-13 | Stop reason: ALTCHOICE

## 2018-12-07 RX ORDER — ISOSORBIDE MONONITRATE 60 MG/1
60 TABLET, EXTENDED RELEASE ORAL DAILY
Qty: 30 TABLET | Refills: 11 | Status: SHIPPED | OUTPATIENT
Start: 2018-12-07 | End: 2019-09-17

## 2018-12-07 NOTE — LETTER
December 7, 2018      Norma Nuñez MD  [id number] [address] [address]           [facility] Cardiology  [address] [facility] [place] [address]  Phone: [phone]  Fax: [phone]          Patient: Kodi Ribeiro   MR Number: [id number]   YOB: 1950   Date of Visit: 12/7/2018       Dear Dr. Norma Nuñez:    Thank you for referring Kodi Ribeiro to me for evaluation. Attached you will find relevant portions of my assessment and plan of care.    If you have questions, please do not hesitate to call me. I look forward to following Kodi Ribeiro along with you.    Sincerely,    Adriel Boone MD    Enclosure  CC:  No Recipients    If you would like to receive this communication electronically, please contact [email or web address] or [phone] to request more information on [facility] access.    For providers and/or their staff who would like to refer a patient to [facility], please contact us through our one-stop-shop provider referral line, [facility] , at [phone].    If you feel you have received this communication in error or would no longer like to receive these types of communications, please e-mail [email or web address]

## 2018-12-07 NOTE — PROGRESS NOTES
"Subjective:    Patient ID:  Kodi Ribeiro is a 68 y.o. male who presents for evaluation of Coronary Artery Disease; Hypertension; and Chest Pain      HPI    Pt presents for f/u.  He has CAD, PAD, AAA, carotid disease (h/o L CEA 8/17, Dr Jamil).  I haven't seen him in long time.  He saw his PCP and had some cp sxs and stress test was negative.  PCP referred in case he needed heart cath due to concerns over angina.  - nuclear stress MPI No 2018.  Echo 8/17 normal LVEF, DD.  He has h/o CAD with 3 stents 2000 for MI by Dr. Gonzalez.  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  H/o remote AAA, then graft infection and aorto-bifem bypass 2014.  He was started on Imdur recently 2 weeks ago.   He has had discomfort from upper abdomen, goes into both sides of chest, and both arms and into throat.  If he rests, sxs resolve.  Then states it doesn't occur again unless he is tired.  sxs not during daytime.  He has hard time explaining his sxs.  sxs last a minute or two.  sxs seem to be with activity. Not with eating.  No other associated sxs.  sxs occur variably, may not occur for a week or so, then returns.  sxs developed few months ago.  Antacids may help.  States if he doesn't take his omeprazole, he "can't make it through day".  Sees Dr. Jamil for PAD.  No TIA/CVA sx.  Lipids controlled on statin.  BP above goal.      Past Medical History:   Diagnosis Date    Anemia     Arthritis     Colon polyp     Repeat colonoscopy due in 9/14    Coronary artery disease     Diverticulosis     colonoscopy 2/21/2014    Encounter for blood transfusion     GERD (gastroesophageal reflux disease)     Hemorrhoids     colonoscopy 2/21/2014    Horseshoe kidney     Hyperglycemia 3/17/2014    Hyperlipidemia     Hypertension     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    Late complications of amputation stump     rseolved with further amputation( MRSA then none since 2014)    Lipoma of colon     " colonoscopy 2/21/2014    Myocardial infarction     per patient 2000 & 9/2012    Peripheral vascular disease     Phantom limb syndrome     patient reports only intermittent not problematic, not worsening    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    Spinal cord disease     L4L5 disc    Stroke     Tobacco dependence     resolved    Ureteral stent retained        Current Outpatient Medications:     amLODIPine (NORVASC) 10 MG tablet, TAKE 1 TABLET EVERY DAY, Disp: 90 tablet, Rfl: 3    carvedilol (COREG) 6.25 MG tablet, TAKE 1 TABLET TWICE DAILY, Disp: 180 tablet, Rfl: 3    clopidogrel (PLAVIX) 75 mg tablet, TAKE 1 TABLET EVERY DAY, Disp: 90 tablet, Rfl: 3    losartan (COZAAR) 100 MG tablet, TAKE 1 TABLET ONE TIME DAILY, Disp: 90 tablet, Rfl: 3    nitroglycerin (NITROSTAT) 0.6 MG Subl, Place 1 tablet (0.6 mg total) under the tongue every 5 (five) minutes as needed (max 3/ per episode)., Disp: 30 tablet, Rfl: 1    pravastatin (PRAVACHOL) 80 MG tablet, TAKE 1 TABLET EVERY EVENING, Disp: 90 tablet, Rfl: 3    docusate sodium (COLACE) 100 MG capsule, Take 1 capsule (100 mg total) by mouth 2 (two) times daily., Disp: 60 capsule, Rfl: 0    isosorbide mononitrate (IMDUR) 60 MG 24 hr tablet, Take 1 tablet (60 mg total) by mouth once daily., Disp: 30 tablet, Rfl: 11    oxyCODONE-acetaminophen (PERCOCET) 5-325 mg per tablet, Take 1-2 tablets by mouth every 4 (four) hours as needed for Pain., Disp: 30 tablet, Rfl: 0    pantoprazole (PROTONIX) 40 MG tablet, Take 1 tablet (40 mg total) by mouth once daily., Disp: 30 tablet, Rfl: 11    triamcinolone acetonide 0.025% (KENALOG) 0.025 % cream, Apply topically 2 (two) times daily., Disp: 15 g, Rfl: 0      Review of Systems   Constitution: Negative.   HENT: Negative.    Eyes: Negative.    Cardiovascular: Positive for chest pain and dyspnea on exertion.   Respiratory: Positive for shortness of breath.    Endocrine: Negative.    Hematologic/Lymphatic: Negative.   "  Skin: Negative.    Musculoskeletal: Positive for arthritis.   Gastrointestinal: Positive for heartburn.   Genitourinary: Negative.    Neurological: Negative.    Psychiatric/Behavioral: Negative.    Allergic/Immunologic: Negative.        BP (!) 150/62 (BP Location: Left arm, Patient Position: Sitting, BP Method: Large (Manual))   Pulse 72   Ht 6' 2" (1.88 m)   Wt 89.8 kg (198 lb)   BMI 25.42 kg/m²     Wt Readings from Last 3 Encounters:   12/07/18 89.8 kg (198 lb)   11/20/18 88.9 kg (195 lb 15.8 oz)   11/06/18 89.6 kg (197 lb 8.5 oz)     Temp Readings from Last 3 Encounters:   11/20/18 97.9 °F (36.6 °C) (Tympanic)   11/06/18 99.2 °F (37.3 °C) (Tympanic)   05/29/18 98.4 °F (36.9 °C) (Temporal)     BP Readings from Last 3 Encounters:   12/07/18 (!) 150/62   11/20/18 138/60   11/06/18 (!) 162/68     Pulse Readings from Last 3 Encounters:   12/07/18 72   11/20/18 73   11/06/18 86          Objective:    Physical Exam   Constitutional: He is oriented to person, place, and time. He appears well-developed and well-nourished.   HENT:   Head: Normocephalic.   Neck: Normal range of motion. Neck supple. Normal carotid pulses, no hepatojugular reflux and no JVD present. Carotid bruit is not present. No thyromegaly present.   Cardiovascular: Normal rate, regular rhythm, S1 normal and S2 normal. PMI is not displaced. Exam reveals no S3, no S4, no distant heart sounds, no friction rub, no midsystolic click and no opening snap.   Murmur heard.   Medium-pitched midsystolic murmur is present with a grade of 1/6 at the lower left sternal border.  Pulses:       Radial pulses are 2+ on the right side, and 2+ on the left side.   Pulmonary/Chest: Effort normal and breath sounds normal. He has no wheezes. He has no rales.   Abdominal: Soft. Bowel sounds are normal. He exhibits no distension, no abdominal bruit, no ascites and no mass. There is no tenderness.   Musculoskeletal: He exhibits no edema.   Neurological: He is alert and " oriented to person, place, and time.   Skin: Skin is warm.   Psychiatric: He has a normal mood and affect. His behavior is normal.   Nursing note and vitals reviewed.      I have reviewed all pertinent labs and cardiac studies.      Chemistry        Component Value Date/Time     05/17/2018 1521    K 4.6 05/17/2018 1521     05/17/2018 1521    CO2 20 (L) 05/17/2018 1521    BUN 19 05/17/2018 1521    CREATININE 1.8 (H) 05/17/2018 1521    GLU 95 05/17/2018 1521        Component Value Date/Time    CALCIUM 8.7 05/17/2018 1521    ALKPHOS 129 10/23/2017 1112    AST 12 10/23/2017 1112    ALT 8 (L) 10/23/2017 1112    BILITOT 0.4 10/23/2017 1112    ESTGFRAFRICA 43.4 (A) 05/17/2018 1521    EGFRNONAA 37.6 (A) 05/17/2018 1521        Lab Results   Component Value Date    WBC 6.29 05/17/2018    HGB 11.0 (L) 05/17/2018    HCT 34.9 (L) 05/17/2018    MCV 92 05/17/2018     05/17/2018     Lab Results   Component Value Date    HGBA1C 5.6 06/23/2016     Lab Results   Component Value Date    CHOL 137 10/23/2017    CHOL 122 08/15/2017    CHOL 141 12/29/2016     Lab Results   Component Value Date    HDL 37 (L) 10/23/2017    HDL 34 (L) 08/15/2017    HDL 29 (L) 12/29/2016     Lab Results   Component Value Date    LDLCALC 73.8 10/23/2017    LDLCALC 62.8 (L) 08/15/2017    LDLCALC 69.2 12/29/2016     Lab Results   Component Value Date    TRIG 131 10/23/2017    TRIG 126 08/15/2017    TRIG 214 (H) 12/29/2016     Lab Results   Component Value Date    CHOLHDL 27.0 10/23/2017    CHOLHDL 27.9 08/15/2017    CHOLHDL 20.6 12/29/2016           Assessment:       1. Atypical chest pain    2. CAD;Old MI ; s/p stents x 3 (2000)     3. Aortic valve stenosis, etiology of cardiac valve disease unspecified    4. 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 PVD (peripheral vascular disease)    6. Essential hypertension    7. Cerebral infarction due to embolism of left middle cerebral artery    8. Left carotid artery stenosis          Plan:             Atypical cp, more likely GI/GERD than CAD/angina but could be atypical angina.    No ischemia on stress MPI and his ecgs are normal.  Increase Imdur to 60 mg qd and change Omeprazole to Protonix 40 mg qd.  Echocardiogram  May take sl ntg prn  F/u with Dr. Jamil on vascular disease.  F/u with me in few weeks to see how he has responded to above changes.  Continue other meds  Higher dose of Imdur will help HTN control.  To ER if sxs worsen.

## 2018-12-13 ENCOUNTER — CLINICAL SUPPORT (OUTPATIENT)
Dept: CARDIOLOGY | Facility: CLINIC | Age: 68
End: 2018-12-13
Attending: INTERNAL MEDICINE
Payer: MEDICARE

## 2018-12-13 DIAGNOSIS — I25.2 OLD MI (MYOCARDIAL INFARCTION): Chronic | ICD-10-CM

## 2018-12-13 DIAGNOSIS — I10 ESSENTIAL HYPERTENSION: Chronic | ICD-10-CM

## 2018-12-13 DIAGNOSIS — R07.89 ATYPICAL CHEST PAIN: ICD-10-CM

## 2018-12-13 DIAGNOSIS — I35.0 AORTIC VALVE STENOSIS, ETIOLOGY OF CARDIAC VALVE DISEASE UNSPECIFIED: Chronic | ICD-10-CM

## 2018-12-13 LAB
DIASTOLIC DYSFUNCTION: YES
ESTIMATED PA SYSTOLIC PRESSURE: 30.46
RETIRED EF AND QEF - SEE NOTES: 55 (ref 55–65)
TRICUSPID VALVE REGURGITATION: ABNORMAL

## 2018-12-13 PROCEDURE — 93306 TTE W/DOPPLER COMPLETE: CPT | Mod: HCNC,S$GLB,, | Performed by: INTERNAL MEDICINE

## 2019-01-11 ENCOUNTER — OFFICE VISIT (OUTPATIENT)
Dept: CARDIOLOGY | Facility: CLINIC | Age: 69
End: 2019-01-11
Payer: MEDICARE

## 2019-01-11 VITALS
HEART RATE: 72 BPM | BODY MASS INDEX: 25.8 KG/M2 | DIASTOLIC BLOOD PRESSURE: 64 MMHG | HEIGHT: 74 IN | SYSTOLIC BLOOD PRESSURE: 140 MMHG | WEIGHT: 201.06 LBS

## 2019-01-11 DIAGNOSIS — N18.30 CKD (CHRONIC KIDNEY DISEASE) STAGE 3, GFR 30-59 ML/MIN: Chronic | ICD-10-CM

## 2019-01-11 DIAGNOSIS — I10 ESSENTIAL HYPERTENSION: Chronic | ICD-10-CM

## 2019-01-11 DIAGNOSIS — I25.2 OLD MI (MYOCARDIAL INFARCTION): Chronic | ICD-10-CM

## 2019-01-11 DIAGNOSIS — Z86.73 HISTORY OF CVA (CEREBROVASCULAR ACCIDENT): ICD-10-CM

## 2019-01-11 DIAGNOSIS — I73.9 PVD (PERIPHERAL VASCULAR DISEASE): Chronic | ICD-10-CM

## 2019-01-11 DIAGNOSIS — I65.22 LEFT CAROTID ARTERY STENOSIS: ICD-10-CM

## 2019-01-11 DIAGNOSIS — I11.9 HYPERTENSIVE LEFT VENTRICULAR HYPERTROPHY, WITHOUT HEART FAILURE: ICD-10-CM

## 2019-01-11 DIAGNOSIS — R07.89 ATYPICAL CHEST PAIN: ICD-10-CM

## 2019-01-11 DIAGNOSIS — I20.9 AP (ANGINA PECTORIS): Primary | ICD-10-CM

## 2019-01-11 DIAGNOSIS — Q63.1 HORSESHOE KIDNEY: Chronic | ICD-10-CM

## 2019-01-11 DIAGNOSIS — I35.0 NONRHEUMATIC AORTIC VALVE STENOSIS: Chronic | ICD-10-CM

## 2019-01-11 PROCEDURE — 1101F PR PT FALLS ASSESS DOC 0-1 FALLS W/OUT INJ PAST YR: ICD-10-PCS | Mod: CPTII,HCNC,S$GLB, | Performed by: INTERNAL MEDICINE

## 2019-01-11 PROCEDURE — 3078F DIAST BP <80 MM HG: CPT | Mod: CPTII,HCNC,S$GLB, | Performed by: INTERNAL MEDICINE

## 2019-01-11 PROCEDURE — 1101F PT FALLS ASSESS-DOCD LE1/YR: CPT | Mod: CPTII,HCNC,S$GLB, | Performed by: INTERNAL MEDICINE

## 2019-01-11 PROCEDURE — 3077F PR MOST RECENT SYSTOLIC BLOOD PRESSURE >= 140 MM HG: ICD-10-PCS | Mod: CPTII,HCNC,S$GLB, | Performed by: INTERNAL MEDICINE

## 2019-01-11 PROCEDURE — 3077F SYST BP >= 140 MM HG: CPT | Mod: CPTII,HCNC,S$GLB, | Performed by: INTERNAL MEDICINE

## 2019-01-11 PROCEDURE — 3078F PR MOST RECENT DIASTOLIC BLOOD PRESSURE < 80 MM HG: ICD-10-PCS | Mod: CPTII,HCNC,S$GLB, | Performed by: INTERNAL MEDICINE

## 2019-01-11 PROCEDURE — 99215 OFFICE O/P EST HI 40 MIN: CPT | Mod: HCNC,S$GLB,, | Performed by: INTERNAL MEDICINE

## 2019-01-11 PROCEDURE — 99999 PR PBB SHADOW E&M-EST. PATIENT-LVL III: ICD-10-PCS | Mod: PBBFAC,HCNC,, | Performed by: INTERNAL MEDICINE

## 2019-01-11 PROCEDURE — 99215 PR OFFICE/OUTPT VISIT, EST, LEVL V, 40-54 MIN: ICD-10-PCS | Mod: HCNC,S$GLB,, | Performed by: INTERNAL MEDICINE

## 2019-01-11 PROCEDURE — 99999 PR PBB SHADOW E&M-EST. PATIENT-LVL III: CPT | Mod: PBBFAC,HCNC,, | Performed by: INTERNAL MEDICINE

## 2019-01-11 RX ORDER — CARVEDILOL 12.5 MG/1
12.5 TABLET ORAL 2 TIMES DAILY WITH MEALS
Qty: 180 TABLET | Refills: 3 | Status: SHIPPED | OUTPATIENT
Start: 2019-01-11 | End: 2019-11-07

## 2019-01-11 RX ORDER — ASPIRIN 81 MG/1
81 TABLET ORAL DAILY
Refills: 0 | COMMUNITY
Start: 2019-01-11 | End: 2021-01-01

## 2019-01-11 NOTE — PROGRESS NOTES
"Subjective:    Patient ID:  Kodi Ribeiro is a 68 y.o. male who presents for evaluation of Chest Pain,  Coronary Artery Disease; Hypertension; and Results        HPI Pt presents for f/u of test results.  He has CAD, PAD, AAA, CRI, carotid disease (h/o L CEA 8/17, Dr Jamil), R LE BKA.  Pt seen few weeks ago after having not been seen in long time.   He saw his PCP before his last appt with me for cp and in case he needed a heart cath for angina.  - nuclear stress MPI Nov 2018.  Echo 8/17 normal LVEF, DD.  He has h/o CAD with 3 stents 2000 for MI by Dr. Gonzalez.  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  H/o remote AAA, then graft infection and aorto-bifem bypass 2014.  He was started on Imdur recently and was increased to 60 mg qd last appt.  I switched his omeprazole to protonix but he didn't think it helped so he went back to omeprazole.  He states his GERD sxs now are back to normal but he still has cp sxs.  CP more at night, heaviness at times, and can be "bad case of indigestion", upper abdomen lower chest, 2 - 3 minutes.  It can radiate to both shoulders.  He also gets flushed and then gets a neck discomfort.   sxs last few months.   He is not sure sl ntg helps his cp. Has taken ntg.  Imdur may have helped his cp sxs.  ecgs have been normal.   Echo 12/18 normal LVEF, DD, LVH.      Current Outpatient Medications:     amLODIPine (NORVASC) 10 MG tablet, TAKE 1 TABLET EVERY DAY, Disp: 90 tablet, Rfl: 3    clopidogrel (PLAVIX) 75 mg tablet, TAKE 1 TABLET EVERY DAY, Disp: 90 tablet, Rfl: 3    isosorbide mononitrate (IMDUR) 60 MG 24 hr tablet, Take 1 tablet (60 mg total) by mouth once daily., Disp: 30 tablet, Rfl: 11    losartan (COZAAR) 100 MG tablet, TAKE 1 TABLET ONE TIME DAILY, Disp: 90 tablet, Rfl: 3    nitroglycerin (NITROSTAT) 0.6 MG Subl, Place 1 tablet (0.6 mg total) under the tongue every 5 (five) minutes as needed (max 3/ per episode)., Disp: 30 tablet, Rfl: " "1    pravastatin (PRAVACHOL) 80 MG tablet, TAKE 1 TABLET EVERY EVENING, Disp: 90 tablet, Rfl: 3    carvedilol (COREG) 12.5 MG tablet, Take 1 tablet (12.5 mg total) by mouth 2 (two) times daily with meals., Disp: 180 tablet, Rfl: 3    docusate sodium (COLACE) 100 MG capsule, Take 1 capsule (100 mg total) by mouth 2 (two) times daily., Disp: 60 capsule, Rfl: 0    oxyCODONE-acetaminophen (PERCOCET) 5-325 mg per tablet, Take 1-2 tablets by mouth every 4 (four) hours as needed for Pain., Disp: 30 tablet, Rfl: 0    pantoprazole (PROTONIX) 40 MG tablet, Take 1 tablet (40 mg total) by mouth once daily., Disp: 30 tablet, Rfl: 11    triamcinolone acetonide 0.025% (KENALOG) 0.025 % cream, Apply topically 2 (two) times daily., Disp: 15 g, Rfl: 0      Review of Systems   Constitution: Negative.   HENT: Negative.    Eyes: Negative.    Cardiovascular: Positive for chest pain.   Respiratory: Negative.    Endocrine: Negative.    Hematologic/Lymphatic: Negative.    Skin: Negative.    Musculoskeletal: Negative.    Gastrointestinal: Positive for heartburn.   Genitourinary: Negative.    Neurological: Negative.    Psychiatric/Behavioral: Negative.    Allergic/Immunologic: Negative.        BP (!) 140/64 (BP Location: Left arm, Patient Position: Sitting, BP Method: Medium (Manual))   Pulse 72   Ht 6' 2" (1.88 m)   Wt 91.2 kg (201 lb 1 oz)   BMI 25.81 kg/m²     Wt Readings from Last 3 Encounters:   01/11/19 91.2 kg (201 lb 1 oz)   12/07/18 89.8 kg (198 lb)   11/20/18 88.9 kg (195 lb 15.8 oz)     Temp Readings from Last 3 Encounters:   11/20/18 97.9 °F (36.6 °C) (Tympanic)   11/06/18 99.2 °F (37.3 °C) (Tympanic)   05/29/18 98.4 °F (36.9 °C) (Temporal)     BP Readings from Last 3 Encounters:   01/11/19 (!) 140/64   12/07/18 (!) 150/62   11/20/18 138/60     Pulse Readings from Last 3 Encounters:   01/11/19 72   12/07/18 72   11/20/18 73          Objective:    Physical Exam   Constitutional: He is oriented to person, place, and time. " He appears well-developed and well-nourished.   HENT:   Head: Normocephalic.   Neck: Normal range of motion. Neck supple. Normal carotid pulses, no hepatojugular reflux and no JVD present. Carotid bruit is not present. No thyromegaly present.   Cardiovascular: Normal rate, regular rhythm, S1 normal and S2 normal. PMI is not displaced. Exam reveals no S3, no S4, no distant heart sounds, no friction rub, no midsystolic click and no opening snap.   No murmur heard.  Pulses:       Radial pulses are 2+ on the right side, and 2+ on the left side.        Femoral pulses are on the right side with bruit, and on the left side with bruit.  Pulmonary/Chest: Effort normal and breath sounds normal. He has no wheezes. He has no rales.   Abdominal: Soft. Bowel sounds are normal. He exhibits no distension, no abdominal bruit, no ascites and no mass. There is no tenderness.   Musculoskeletal: He exhibits no edema.   Neurological: He is alert and oriented to person, place, and time.   Skin: Skin is warm.   Psychiatric: He has a normal mood and affect. His behavior is normal.   Nursing note and vitals reviewed.      I have reviewed all pertinent labs and cardiac studies.      Chemistry        Component Value Date/Time     05/17/2018 1521    K 4.6 05/17/2018 1521     05/17/2018 1521    CO2 20 (L) 05/17/2018 1521    BUN 19 05/17/2018 1521    CREATININE 1.8 (H) 05/17/2018 1521    GLU 95 05/17/2018 1521        Component Value Date/Time    CALCIUM 8.7 05/17/2018 1521    ALKPHOS 129 10/23/2017 1112    AST 12 10/23/2017 1112    ALT 8 (L) 10/23/2017 1112    BILITOT 0.4 10/23/2017 1112    ESTGFRAFRICA 43.4 (A) 05/17/2018 1521    EGFRNONAA 37.6 (A) 05/17/2018 1521        Lab Results   Component Value Date    WBC 6.29 05/17/2018    HGB 11.0 (L) 05/17/2018    HCT 34.9 (L) 05/17/2018    MCV 92 05/17/2018     05/17/2018     Lab Results   Component Value Date    HGBA1C 5.6 06/23/2016     Lab Results   Component Value Date    CHOL  137 10/23/2017    CHOL 122 08/15/2017    CHOL 141 12/29/2016     Lab Results   Component Value Date    HDL 37 (L) 10/23/2017    HDL 34 (L) 08/15/2017    HDL 29 (L) 12/29/2016     Lab Results   Component Value Date    LDLCALC 73.8 10/23/2017    LDLCALC 62.8 (L) 08/15/2017    LDLCALC 69.2 12/29/2016     Lab Results   Component Value Date    TRIG 131 10/23/2017    TRIG 126 08/15/2017    TRIG 214 (H) 12/29/2016     Lab Results   Component Value Date    CHOLHDL 27.0 10/23/2017    CHOLHDL 27.9 08/15/2017    CHOLHDL 20.6 12/29/2016     Date of Procedure: 12/13/2018        TEST DESCRIPTION   Technical Quality: This is a technically adequate study.     Aorta: The aortic root is normal in size, measuring 2.3 cm at sinotubular junction and 3.0 cm at Sinuses of Valsalva. The proximal ascending aorta is normal in size, measuring 2.8 cm across.     Left Atrium: The left atrial volume index is severely enlarged, measuring 59.09 cc/m2.     Left Ventricle: The left ventricle is normal in size, with an end-diastolic diameter of 3.9 cm, and an end-systolic diameter of 3.0 cm. LV wall thickness is normal, with the septum measuring 1.7 cm and the posterior wall measuring 1.4 cm across. Relative   wall thickness was increased at 0.72, and the LV mass index was increased at 132.8 g/m2 consistent with concentric left ventricular hypertrophy. There are no regional wall motion abnormalities. Left ventricular systolic function appears normal. Visually   estimated ejection fraction is 55-60%. The LV Doppler derived stroke volume equals 96.0 ccs.     Diastolic indices: E wave velocity 0.9 m/s, E/A ratio 0.7,  msec., E/e' ratio(avg) 12. Mean left atrial pressures are increased. There is diastolic dysfunction secondary to relaxation abnormality.     Right Atrium: The right atrium is enlarged, measuring 6.1 cm in length and 4.3 cm in width in the apical view.     Right Ventricle: The right ventricle is normal in size measuring 3.2 cm at the  base in the apical right ventricle-focused view. Global right ventricular systolic function appears normal. The estimated PA systolic pressure is 30 mmHg.     Aortic Valve:  The aortic valve is mildly sclerotic. The aortic valve is tri-leaflet in structure.     Mitral Valve:  The mitral valve is normal in structure. There is mitral annular calcification.     Tricuspid Valve:  The tricuspid valve is normal in structure. There is trivial tricuspid regurgitation.     Pulmonary Valve:  The pulmonic valve is not well seen.     IVC: IVC is normal in size and collapses > 50% with a sniff, suggesting normal right atrial pressure of 3 mmHg.     Intracavitary: There is no evidence of pericardial effusion, intracavity mass, thrombi, or vegetation.         CONCLUSIONS     1 - Biatrial enlargement.     2 - Concentric hypertrophy.     3 - No wall motion abnormalities.     4 - Normal left ventricular systolic function (EF 55-60%).     5 - Impaired LV relaxation, normal LAP (grade 1 diastolic dysfunction).     6 - Normal right ventricular systolic function .     7 - The estimated PA systolic pressure is 30 mmHg.     8 - Trivial tricuspid regurgitation.             This document has been electronically    SIGNED BY: Edenilson Beckman MD On: 12/13/2018 18:56        Assessment:       1. AP (angina pectoris)    2. CAD;Old MI ; s/p stents x 3 (2000)     3. Atypical chest pain    4. Nonrheumatic aortic valve stenosis    5. PVD (peripheral vascular disease)    6. Left carotid artery stenosis    7. Essential hypertension    8. History of CVA (cerebrovascular accident)    9. Hypertensive left ventricular hypertrophy, without heart failure    10. Horseshoe kidney    11. CKD (chronic kidney disease) stage 3, GFR 30-59 ml/min         Plan:             CP sxs concerning for worsening anginal sxs last few months with atypical features.  Stress MPI negative for ischemia few months ago but may be false negative.  GERD seems better.  Imdur may have helped  angina.  Increase Coreg to 12.5 mg bid from 6.25 mg bid for better HTN control and for CAD/angina.   CRI needs reevaluation with labs.  Ultimately I think he needs repeat cath to reassess his anatomy and exclude high grade stenosis.  He has not had cath since 2000.  Indications for LHC/PCI, risks/benefits and risk of worsening renal failure discussed with pt in detail.  Continue other meds.  Cardiac diet  Ok to go back to omeprazole  Add 81 mg ASA qd.       Pt advised to undergo left heart catheterization with possible PCI if warranted.  Pt advised of all risks and benefits of procedure.  Pt advised of alternative approaches and treatment strategies to include medical therapy, PCI as well as surgical revascularization with possible CABG.  All questions answered in clinic.     Left radial approach    Procedure scheduled Wed Feb 6, 2018 0830

## 2019-01-23 ENCOUNTER — LAB VISIT (OUTPATIENT)
Dept: LAB | Facility: HOSPITAL | Age: 69
End: 2019-01-23
Attending: INTERNAL MEDICINE
Payer: MEDICARE

## 2019-01-23 DIAGNOSIS — I20.9 AP (ANGINA PECTORIS): ICD-10-CM

## 2019-01-23 DIAGNOSIS — R07.89 ATYPICAL CHEST PAIN: ICD-10-CM

## 2019-01-23 DIAGNOSIS — I25.2 OLD MI (MYOCARDIAL INFARCTION): Chronic | ICD-10-CM

## 2019-01-23 LAB
ANION GAP SERPL CALC-SCNC: 9 MMOL/L
APTT BLDCRRT: 30.1 SEC
BASOPHILS # BLD AUTO: 0.05 K/UL
BASOPHILS NFR BLD: 0.6 %
BUN SERPL-MCNC: 36 MG/DL
CALCIUM SERPL-MCNC: 8.7 MG/DL
CHLORIDE SERPL-SCNC: 112 MMOL/L
CO2 SERPL-SCNC: 17 MMOL/L
CREAT SERPL-MCNC: 2.6 MG/DL
DIFFERENTIAL METHOD: ABNORMAL
EOSINOPHIL # BLD AUTO: 0.3 K/UL
EOSINOPHIL NFR BLD: 4.1 %
ERYTHROCYTE [DISTWIDTH] IN BLOOD BY AUTOMATED COUNT: 13.1 %
EST. GFR  (AFRICAN AMERICAN): 27.6 ML/MIN/1.73 M^2
EST. GFR  (NON AFRICAN AMERICAN): 23.9 ML/MIN/1.73 M^2
GLUCOSE SERPL-MCNC: 112 MG/DL
HCT VFR BLD AUTO: 30.3 %
HGB BLD-MCNC: 9.4 G/DL
IMM GRANULOCYTES # BLD AUTO: 0.03 K/UL
IMM GRANULOCYTES NFR BLD AUTO: 0.4 %
INR PPP: 1
LYMPHOCYTES # BLD AUTO: 1.3 K/UL
LYMPHOCYTES NFR BLD: 15 %
MCH RBC QN AUTO: 28.7 PG
MCHC RBC AUTO-ENTMCNC: 31 G/DL
MCV RBC AUTO: 93 FL
MONOCYTES # BLD AUTO: 0.6 K/UL
MONOCYTES NFR BLD: 7.2 %
NEUTROPHILS # BLD AUTO: 6.1 K/UL
NEUTROPHILS NFR BLD: 72.7 %
NRBC BLD-RTO: 0 /100 WBC
PLATELET # BLD AUTO: 207 K/UL
PMV BLD AUTO: 10.9 FL
POTASSIUM SERPL-SCNC: 4.5 MMOL/L
PROTHROMBIN TIME: 10.5 SEC
RBC # BLD AUTO: 3.27 M/UL
SODIUM SERPL-SCNC: 138 MMOL/L
WBC # BLD AUTO: 8.38 K/UL

## 2019-01-23 PROCEDURE — 85730 THROMBOPLASTIN TIME PARTIAL: CPT | Mod: HCNC

## 2019-01-23 PROCEDURE — 80048 BASIC METABOLIC PNL TOTAL CA: CPT | Mod: HCNC

## 2019-01-23 PROCEDURE — 85610 PROTHROMBIN TIME: CPT | Mod: HCNC

## 2019-01-23 PROCEDURE — 85025 COMPLETE CBC W/AUTO DIFF WBC: CPT | Mod: HCNC

## 2019-01-23 PROCEDURE — 36415 COLL VENOUS BLD VENIPUNCTURE: CPT | Mod: HCNC,PO

## 2019-01-24 ENCOUNTER — TELEPHONE (OUTPATIENT)
Dept: CARDIOLOGY | Facility: CLINIC | Age: 69
End: 2019-01-24

## 2019-01-24 DIAGNOSIS — N17.9 ACUTE RENAL FAILURE, UNSPECIFIED ACUTE RENAL FAILURE TYPE: Primary | ICD-10-CM

## 2019-01-24 DIAGNOSIS — D64.9 ANEMIA, UNSPECIFIED TYPE: ICD-10-CM

## 2019-01-25 NOTE — TELEPHONE ENCOUNTER
Kimberley,    Please call pt and cancel upcoming heart cath.  His kidney function is worse with creatinine now up to 2.6 and is also more anemic.  He needs to see Nephrology for consultation on his kidney failure and hematology for anemia and then schedule f/u appt with me next month and will see how things look.      Dr Boone

## 2019-01-25 NOTE — TELEPHONE ENCOUNTER
Spoke with patient's wife regarding lab work, needing to see Nephrology and cancelling heart cath for 2/6/19.  Confirmed Dr. Jacobson on 1/29 for noon at [facility] location

## 2019-01-29 ENCOUNTER — OFFICE VISIT (OUTPATIENT)
Dept: NEPHROLOGY | Facility: CLINIC | Age: 69
End: 2019-01-29
Payer: MEDICARE

## 2019-01-29 ENCOUNTER — INITIAL CONSULT (OUTPATIENT)
Dept: HEMATOLOGY/ONCOLOGY | Facility: CLINIC | Age: 69
End: 2019-01-29
Payer: MEDICARE

## 2019-01-29 ENCOUNTER — LAB VISIT (OUTPATIENT)
Dept: LAB | Facility: HOSPITAL | Age: 69
End: 2019-01-29
Attending: NURSE PRACTITIONER
Payer: MEDICARE

## 2019-01-29 VITALS
HEART RATE: 83 BPM | TEMPERATURE: 98 F | DIASTOLIC BLOOD PRESSURE: 66 MMHG | SYSTOLIC BLOOD PRESSURE: 144 MMHG | OXYGEN SATURATION: 98 % | BODY MASS INDEX: 26.33 KG/M2 | WEIGHT: 198.63 LBS | HEIGHT: 73 IN

## 2019-01-29 VITALS
HEART RATE: 74 BPM | SYSTOLIC BLOOD PRESSURE: 136 MMHG | WEIGHT: 199.06 LBS | HEIGHT: 73 IN | DIASTOLIC BLOOD PRESSURE: 60 MMHG | BODY MASS INDEX: 26.38 KG/M2

## 2019-01-29 DIAGNOSIS — N18.30 CKD (CHRONIC KIDNEY DISEASE) STAGE 3, GFR 30-59 ML/MIN: Chronic | ICD-10-CM

## 2019-01-29 DIAGNOSIS — N17.9 AKI (ACUTE KIDNEY INJURY): Primary | ICD-10-CM

## 2019-01-29 DIAGNOSIS — Z96.0 URETERAL STENT RETAINED: Chronic | ICD-10-CM

## 2019-01-29 DIAGNOSIS — K21.9 HIATAL HERNIA WITH GERD: Chronic | ICD-10-CM

## 2019-01-29 DIAGNOSIS — Z89.511 STATUS POST BELOW KNEE AMPUTATION OF RIGHT LOWER EXTREMITY: Chronic | ICD-10-CM

## 2019-01-29 DIAGNOSIS — K44.9 HIATAL HERNIA WITH GERD: Chronic | ICD-10-CM

## 2019-01-29 DIAGNOSIS — R07.89 ATYPICAL CHEST PAIN: ICD-10-CM

## 2019-01-29 DIAGNOSIS — D50.0 ANEMIA DUE TO CHRONIC BLOOD LOSS: ICD-10-CM

## 2019-01-29 DIAGNOSIS — Q63.1 HORSESHOE KIDNEY: Chronic | ICD-10-CM

## 2019-01-29 DIAGNOSIS — I73.9 PVD (PERIPHERAL VASCULAR DISEASE): Chronic | ICD-10-CM

## 2019-01-29 DIAGNOSIS — N18.31 CHRONIC KIDNEY DISEASE (CKD) STAGE G3A/A1, MODERATELY DECREASED GLOMERULAR FILTRATION RATE (GFR) BETWEEN 45-59 ML/MIN/1.73 SQUARE METER AND ALBUMINURIA CREATININE RATIO LESS THAN 30 MG/G: ICD-10-CM

## 2019-01-29 DIAGNOSIS — N13.5 URETERAL STRICTURE, LEFT: Chronic | ICD-10-CM

## 2019-01-29 DIAGNOSIS — D50.0 ANEMIA DUE TO CHRONIC BLOOD LOSS: Primary | ICD-10-CM

## 2019-01-29 DIAGNOSIS — I10 ESSENTIAL HYPERTENSION: Chronic | ICD-10-CM

## 2019-01-29 LAB
ALBUMIN SERPL BCP-MCNC: 3.8 G/DL
ALP SERPL-CCNC: 188 U/L
ALT SERPL W/O P-5'-P-CCNC: 11 U/L
ANION GAP SERPL CALC-SCNC: 11 MMOL/L
AST SERPL-CCNC: 13 U/L
BASOPHILS # BLD AUTO: 0.04 K/UL
BASOPHILS NFR BLD: 0.5 %
BILIRUB SERPL-MCNC: 0.4 MG/DL
BUN SERPL-MCNC: 31 MG/DL
CALCIUM SERPL-MCNC: 9 MG/DL
CHLORIDE SERPL-SCNC: 111 MMOL/L
CO2 SERPL-SCNC: 18 MMOL/L
CREAT SERPL-MCNC: 2.3 MG/DL
DIFFERENTIAL METHOD: ABNORMAL
EOSINOPHIL # BLD AUTO: 0.3 K/UL
EOSINOPHIL NFR BLD: 4 %
ERYTHROCYTE [DISTWIDTH] IN BLOOD BY AUTOMATED COUNT: 13.7 %
EST. GFR  (AFRICAN AMERICAN): 32 ML/MIN/1.73 M^2
EST. GFR  (NON AFRICAN AMERICAN): 28 ML/MIN/1.73 M^2
FERRITIN SERPL-MCNC: 262 NG/ML
GLUCOSE SERPL-MCNC: 113 MG/DL
HCT VFR BLD AUTO: 30 %
HGB BLD-MCNC: 9.8 G/DL
IRON SERPL-MCNC: 39 UG/DL
LDH SERPL L TO P-CCNC: 168 U/L
LYMPHOCYTES # BLD AUTO: 1.3 K/UL
LYMPHOCYTES NFR BLD: 16.8 %
MCH RBC QN AUTO: 29.3 PG
MCHC RBC AUTO-ENTMCNC: 32.7 G/DL
MCV RBC AUTO: 90 FL
MONOCYTES # BLD AUTO: 0.6 K/UL
MONOCYTES NFR BLD: 7.4 %
NEUTROPHILS # BLD AUTO: 5.6 K/UL
NEUTROPHILS NFR BLD: 71.8 %
OVALOCYTES BLD QL SMEAR: ABNORMAL
PATH REV BLD -IMP: NORMAL
PLATELET # BLD AUTO: 210 K/UL
PMV BLD AUTO: 9.2 FL
POIKILOCYTOSIS BLD QL SMEAR: SLIGHT
POTASSIUM SERPL-SCNC: 4.5 MMOL/L
PROT SERPL-MCNC: 8 G/DL
RBC # BLD AUTO: 3.34 M/UL
SATURATED IRON: 15 %
SODIUM SERPL-SCNC: 140 MMOL/L
TOTAL IRON BINDING CAPACITY: 260 UG/DL
TRANSFERRIN SERPL-MCNC: 176 MG/DL
WBC # BLD AUTO: 7.82 K/UL

## 2019-01-29 PROCEDURE — 99499 UNLISTED E&M SERVICE: CPT | Mod: S$GLB,,, | Performed by: INTERNAL MEDICINE

## 2019-01-29 PROCEDURE — 3075F SYST BP GE 130 - 139MM HG: CPT | Mod: HCNC,CPTII,S$GLB, | Performed by: INTERNAL MEDICINE

## 2019-01-29 PROCEDURE — 83520 IMMUNOASSAY QUANT NOS NONAB: CPT | Mod: HCNC

## 2019-01-29 PROCEDURE — 82728 ASSAY OF FERRITIN: CPT | Mod: HCNC

## 2019-01-29 PROCEDURE — 84165 PROTEIN E-PHORESIS SERUM: CPT | Mod: HCNC

## 2019-01-29 PROCEDURE — 99204 OFFICE O/P NEW MOD 45 MIN: CPT | Mod: HCNC,S$GLB,, | Performed by: INTERNAL MEDICINE

## 2019-01-29 PROCEDURE — 1101F PT FALLS ASSESS-DOCD LE1/YR: CPT | Mod: HCNC,CPTII,S$GLB, | Performed by: NURSE PRACTITIONER

## 2019-01-29 PROCEDURE — 1101F PR PT FALLS ASSESS DOC 0-1 FALLS W/OUT INJ PAST YR: ICD-10-PCS | Mod: HCNC,CPTII,S$GLB, | Performed by: INTERNAL MEDICINE

## 2019-01-29 PROCEDURE — 99205 PR OFFICE/OUTPT VISIT, NEW, LEVL V, 60-74 MIN: ICD-10-PCS | Mod: HCNC,S$GLB,, | Performed by: NURSE PRACTITIONER

## 2019-01-29 PROCEDURE — 3078F DIAST BP <80 MM HG: CPT | Mod: HCNC,CPTII,S$GLB, | Performed by: INTERNAL MEDICINE

## 2019-01-29 PROCEDURE — 3078F PR MOST RECENT DIASTOLIC BLOOD PRESSURE < 80 MM HG: ICD-10-PCS | Mod: HCNC,CPTII,S$GLB, | Performed by: NURSE PRACTITIONER

## 2019-01-29 PROCEDURE — 85025 COMPLETE CBC W/AUTO DIFF WBC: CPT | Mod: HCNC

## 2019-01-29 PROCEDURE — 86334 PATHOLOGIST INTERPRETATION IFE: ICD-10-PCS | Mod: 26,HCNC,, | Performed by: PATHOLOGY

## 2019-01-29 PROCEDURE — 99499 RISK ADDL DX/OHS AUDIT: ICD-10-PCS | Mod: S$GLB,,, | Performed by: INTERNAL MEDICINE

## 2019-01-29 PROCEDURE — 99999 PR PBB SHADOW E&M-EST. PATIENT-LVL III: ICD-10-PCS | Mod: PBBFAC,HCNC,, | Performed by: NURSE PRACTITIONER

## 2019-01-29 PROCEDURE — 3077F SYST BP >= 140 MM HG: CPT | Mod: HCNC,CPTII,S$GLB, | Performed by: NURSE PRACTITIONER

## 2019-01-29 PROCEDURE — 99999 PR PBB SHADOW E&M-EST. PATIENT-LVL III: CPT | Mod: PBBFAC,HCNC,, | Performed by: NURSE PRACTITIONER

## 2019-01-29 PROCEDURE — 83540 ASSAY OF IRON: CPT | Mod: HCNC

## 2019-01-29 PROCEDURE — 84165 PATHOLOGIST INTERPRETATION SPE: ICD-10-PCS | Mod: 26,HCNC,, | Performed by: PATHOLOGY

## 2019-01-29 PROCEDURE — 99999 PR PBB SHADOW E&M-EST. PATIENT-LVL III: CPT | Mod: PBBFAC,HCNC,, | Performed by: INTERNAL MEDICINE

## 2019-01-29 PROCEDURE — 80053 COMPREHEN METABOLIC PANEL: CPT | Mod: HCNC

## 2019-01-29 PROCEDURE — 1101F PT FALLS ASSESS-DOCD LE1/YR: CPT | Mod: HCNC,CPTII,S$GLB, | Performed by: INTERNAL MEDICINE

## 2019-01-29 PROCEDURE — 84165 PROTEIN E-PHORESIS SERUM: CPT | Mod: 26,HCNC,, | Performed by: PATHOLOGY

## 2019-01-29 PROCEDURE — 1101F PR PT FALLS ASSESS DOC 0-1 FALLS W/OUT INJ PAST YR: ICD-10-PCS | Mod: HCNC,CPTII,S$GLB, | Performed by: NURSE PRACTITIONER

## 2019-01-29 PROCEDURE — 99204 PR OFFICE/OUTPT VISIT, NEW, LEVL IV, 45-59 MIN: ICD-10-PCS | Mod: HCNC,S$GLB,, | Performed by: INTERNAL MEDICINE

## 2019-01-29 PROCEDURE — 3077F PR MOST RECENT SYSTOLIC BLOOD PRESSURE >= 140 MM HG: ICD-10-PCS | Mod: HCNC,CPTII,S$GLB, | Performed by: NURSE PRACTITIONER

## 2019-01-29 PROCEDURE — 99999 PR PBB SHADOW E&M-EST. PATIENT-LVL III: ICD-10-PCS | Mod: PBBFAC,HCNC,, | Performed by: INTERNAL MEDICINE

## 2019-01-29 PROCEDURE — 3078F PR MOST RECENT DIASTOLIC BLOOD PRESSURE < 80 MM HG: ICD-10-PCS | Mod: HCNC,CPTII,S$GLB, | Performed by: INTERNAL MEDICINE

## 2019-01-29 PROCEDURE — 36415 COLL VENOUS BLD VENIPUNCTURE: CPT | Mod: HCNC

## 2019-01-29 PROCEDURE — 86334 IMMUNOFIX E-PHORESIS SERUM: CPT | Mod: 26,HCNC,, | Performed by: PATHOLOGY

## 2019-01-29 PROCEDURE — 3075F PR MOST RECENT SYSTOLIC BLOOD PRESS GE 130-139MM HG: ICD-10-PCS | Mod: HCNC,CPTII,S$GLB, | Performed by: INTERNAL MEDICINE

## 2019-01-29 PROCEDURE — 99205 OFFICE O/P NEW HI 60 MIN: CPT | Mod: HCNC,S$GLB,, | Performed by: NURSE PRACTITIONER

## 2019-01-29 PROCEDURE — 3078F DIAST BP <80 MM HG: CPT | Mod: HCNC,CPTII,S$GLB, | Performed by: NURSE PRACTITIONER

## 2019-01-29 PROCEDURE — 86334 IMMUNOFIX E-PHORESIS SERUM: CPT | Mod: HCNC

## 2019-01-29 PROCEDURE — 83615 LACTATE (LD) (LDH) ENZYME: CPT | Mod: HCNC

## 2019-01-29 NOTE — LETTER
January 29, 2019      Adriel Boone MD  [address] [facility] [address]  [facility] [address]           [facility]  [address] [place] [address]  Phone: [phone]  Fax: [phone]          Patient: Kodi Ribeiro   MR Number: [id number]   YOB: 1950   Date of Visit: 1/29/2019       Dear Dr. Adriel Boone:    Thank you for referring Kodi Ribeiro to me for evaluation. Attached you will find relevant portions of my assessment and plan of care.    If you have questions, please do not hesitate to call me. I look forward to following Kodi Ribeiro along with you.    Sincerely,    Brenton Jacobson MD    Enclosure  CC:  No Recipients    If you would like to receive this communication electronically, please contact [email or web address] or [phone] to request more information on [employer] Link access.    For providers and/or their staff who would like to refer a patient to [facility], please contact us through our one-stop-shop provider referral line, [facility], at [phone].    If you feel you have received this communication in error or would no longer like to receive these types of communications, please e-mail [email or web address]

## 2019-01-29 NOTE — PATIENT INSTRUCTIONS
Stop Cozaar/ Losartan     Avoid nonsteroidals; no Advil or Motrin; okay to take Tylenol for pain; if needs stronger pain medications please let us know    Check BP at home and if it is higher than 150 please let us know         1.  Acute kidney injury:  Differential diagnosis includes side-effect of ibuprofen.  Presence of losartan.  DC losartan.  DC ibuprofen.  Hydrate.  Check for ischemic nephropathy.  Check for renal artery stenosis.  Delay cardiac catheterization.    Follow-up in 4 weeks    2.  Chronic kidney disease stage 3:  Likely hypertensive nephropathy.  Check cystatin C on follow-up.  Screen for hyperparathyroidism and vitamin D deficiency.  Avoid nonsteroidals.  Limit IV dye for now.    3.  Nonfunctional left kidney with ureteric stricture status post stent placement.  Repeating renal ultrasound.    4.  Essential hypertension:  Stopping Cozaar.  Check blood pressure at home.  Add Cardura if needed.

## 2019-01-29 NOTE — PROGRESS NOTES
Subjective:       Patient ID: Kodi Ribeiro is a 69 y.o. male.    Chief Complaint: Anemia    69 year old male, presents for evaluation of anemia. I personally reviewed available records and used this information in my clinical decision making today. Patient has had chronic anemia since 2014. He has had multiple amputation procedures due to PVD and bypass grafts placed by Dr. Jamil and Dr. Hyatt. He reports intermittent chest pain which occurs typically in the afternoon after lying down to rest. He was evaluated by Cardiology and was scheduled for Heart Cath but this was cancelled due to Hgb: 9.4. He was then referred to Hem/Onc for evaluation.       Review of Systems   Constitutional: Positive for fatigue. Negative for activity change, appetite change, chills, diaphoresis, fever and unexpected weight change.   HENT: Negative for congestion, hearing loss, mouth sores, nosebleeds and trouble swallowing.    Eyes: Negative for discharge and visual disturbance.   Respiratory: Negative for cough, chest tightness and shortness of breath.    Cardiovascular: Positive for chest pain (intermittent, no chest pain at time of visit). Negative for palpitations and leg swelling.   Gastrointestinal: Negative for constipation, diarrhea, nausea and vomiting.   Endocrine: Negative for cold intolerance and heat intolerance.   Genitourinary: Negative for difficulty urinating, dysuria and hematuria.   Musculoskeletal: Positive for arthralgias, back pain, gait problem and myalgias.   Skin: Negative.    Neurological: Negative for dizziness, weakness, light-headedness and headaches.   Hematological: Negative for adenopathy. Does not bruise/bleed easily.   Psychiatric/Behavioral: Negative for agitation, behavioral problems and confusion. The patient is nervous/anxious.           Medication List           Accurate as of 1/29/19 10:40 AM. If you have any questions, ask your nurse or doctor.               CONTINUE taking these medications     amLODIPine 10 MG tablet  Commonly known as:  NORVASC  TAKE 1 TABLET EVERY DAY     aspirin 81 MG EC tablet  Commonly known as:  ECOTRIN  Take 1 tablet (81 mg total) by mouth once daily.     carvedilol 12.5 MG tablet  Commonly known as:  COREG  Take 1 tablet (12.5 mg total) by mouth 2 (two) times daily with meals.     clopidogrel 75 mg tablet  Commonly known as:  PLAVIX  TAKE 1 TABLET EVERY DAY     docusate sodium 100 MG capsule  Commonly known as:  COLACE  Take 1 capsule (100 mg total) by mouth 2 (two) times daily.     isosorbide mononitrate 60 MG 24 hr tablet  Commonly known as:  IMDUR  Take 1 tablet (60 mg total) by mouth once daily.     losartan 100 MG tablet  Commonly known as:  COZAAR  TAKE 1 TABLET ONE TIME DAILY     nitroglycerin 0.6 MG Subl  Commonly known as:  NITROSTAT  Place 1 tablet (0.6 mg total) under the tongue every 5 (five) minutes as needed (max 3/ per episode).     oxyCODONE-acetaminophen 5-325 mg per tablet  Commonly known as:  PERCOCET  Take 1-2 tablets by mouth every 4 (four) hours as needed for Pain.     pantoprazole 40 MG tablet  Commonly known as:  PROTONIX  Take 1 tablet (40 mg total) by mouth once daily.     pravastatin 80 MG tablet  Commonly known as:  PRAVACHOL  TAKE 1 TABLET EVERY EVENING     triamcinolone acetonide 0.025% 0.025 % cream  Commonly known as:  KENALOG  Apply topically 2 (two) times daily.          Objective:     Vitals:    01/29/19 1013   BP: (!) 144/66   Pulse: 83   Temp: 98.1 °F (36.7 °C)     Physical Exam   Constitutional: He is oriented to person, place, and time. He appears well-developed and well-nourished. No distress.   HENT:   Head: Normocephalic and atraumatic.   Right Ear: Hearing and external ear normal.   Left Ear: Hearing and external ear normal.   Nose: Nose normal. No mucosal edema or rhinorrhea. No epistaxis.   Mouth/Throat: Uvula is midline, oropharynx is clear and moist and mucous membranes are normal.   Eyes: Conjunctivae and EOM are normal. Pupils  are equal, round, and reactive to light. Right eye exhibits no chemosis and no discharge. Left eye exhibits no chemosis and no discharge.   Neck: Trachea normal and normal range of motion. Neck supple. No thyroid mass and no thyromegaly present.   Cardiovascular: Normal rate, regular rhythm, normal heart sounds and intact distal pulses.   No murmur heard.  Pulses:       Dorsalis pedis pulses are 2+ on the right side, and 2+ on the left side.   Pulmonary/Chest: Effort normal and breath sounds normal. No respiratory distress. He has no decreased breath sounds. He has no wheezes.   Abdominal: Soft. Bowel sounds are normal. He exhibits distension. There is no tenderness.   Musculoskeletal: Normal range of motion. He exhibits no edema or tenderness.   Left BKA     Lymphadenopathy:     He has no cervical adenopathy.     He has no axillary adenopathy.        Right: No supraclavicular adenopathy present.        Left: No supraclavicular adenopathy present.   Neurological: He is alert and oriented to person, place, and time. He has normal strength. No cranial nerve deficit. Coordination normal.   Skin: Skin is warm, dry and intact. Capillary refill takes less than 2 seconds. No rash noted. He is not diaphoretic. No erythema. There is pallor.   Psychiatric: His speech is normal and behavior is normal. Judgment and thought content normal. His mood appears anxious. Cognition and memory are normal.       Assessment:       Problem List Items Addressed This Visit        Renal/    CKD (chronic kidney disease) stage 3, GFR 30-59 ml/min (Chronic)    Horseshoe kidney (Chronic)       GI    Hiatal hernia with GERD (Chronic)       Orthopedic    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 (Chronic)       Other    Atypical chest pain      Other Visit Diagnoses     Anemia due to chronic blood loss    -  Primary    Relevant Orders    CBC auto differential    Comprehensive metabolic panel    Ferritin    Iron and TIBC     Lactate dehydrogenase    Pathologist Interpretation Differential    Protein electrophoresis, serum    Immunoglobulin free LT chains blood          Plan:       1) Labs today, will review results when available  2) Return to clinic in 1 week to review results and discuss treatment.     I will review assessment/plan with collaborating physician

## 2019-01-29 NOTE — LETTER
January 29, 2019      Adriel Boone MD  [address]  [facility] [address]           [place] [facility]  [address] [place] [address]  Phone: [phone]  Fax: [phone]          Patient: Kodi Ribeiro   MR Number: [id number]   YOB: 1950   Date of Visit: 1/29/2019       Dear Dr. Adriel Boone:    Thank you for referring Kodi Ribeiro to me for evaluation. Attached you will find relevant portions of my assessment and plan of care.    If you have questions, please do not hesitate to call me. I look forward to following Kodi Ribeiro along with you.    Sincerely,    Breana Carreon NP    Enclosure  CC:  No Recipients    If you would like to receive this communication electronically, please contact [email or web address] or [phone] to request more information on [employer] Link access.    For providers and/or their staff who would like to refer a patient to [facility], please contact us through our one-stop-shop provider referral line, [facility], at [phone].    If you feel you have received this communication in error or would no longer like to receive these types of communications, please e-mail [email or web address]

## 2019-01-29 NOTE — PATIENT INSTRUCTIONS
Anemia, Type Not Specified (Adult)  Red blood cells carry oxygen to the tissues of your body. Anemia is a condition in which you have too few red blood cells. You need iron to make red blood cells. The most common cause of anemia is not having enough iron. This may be because of:  · Loss of blood. This can be caused by heavy menstrual periods. It can also be caused by bleeding from the stomach or intestines.  · Poor diet. You may not be eating enough foods that contain iron.  Other causes of anemia include certain vitamin deficiencies, chronic kidney disease, and certain other chronic illnesses.  Anemia makes you to feel tired and run down. When anemia becomes severe, your skin becomes pale. You may feel short of breath after physical activity. Other symptoms include:  · Headaches  · Dizziness  · Leg cramps with physical activity  · Drowsiness  Home care  Follow these guidelines when caring for yourself at home:  · Dont overexert yourself.  · Talk with your healthcare provider before traveling by air or traveling to high altitudes.  Follow-up care  Follow up with your healthcare provider, or as advised. You may need other blood tests to find out the exact cause of your anemia. If you had testing done today, it may take several days to get all of the results. You can follow up with your own healthcare provider to get the results.  When to seek medical advice  Call your healthcare provider right away if any of these occur:  · Shortness of breath or chest pain  · Dizziness or fainting gets worse  · Vomiting blood or passing red or black-colored stool  Date Last Reviewed: 2/25/2016  © 7254-4689 [email or web address]. [address] [place] [address], [place] [address]. All rights reserved. This information is not intended as a substitute for professional medical care. Always follow your healthcare professional's instructions.

## 2019-01-29 NOTE — PROGRESS NOTES
Subjective:       Patient ID: Kodi Ribeiro is a 69 y.o. White male who presents for new evaluation of Acute Renal Failure; Chronic Kidney Disease; and Hypertension    HPI     Patient is a 69-year-old male with longstanding history of hypertension and peripheral arterial disease with multiple complications including AAA.  Patient had a AAA repair.  Also had complications of left-sided ureteric stricture.  Patient has a history of horseshoe kidney.  The 2 kidneys were  .  He chronically has left-sided ureteric stent replaced by Dr. Jin yearly.  Majority of his kidney function comes from the right kidney.  Left kidney seems to have minimal kidney function.  Has had complications of pyelonephritis on the left kidney.  Patient has severe peripheral artery disease status post a right BKA and left foot amputation.  He has quit smoking.  His baseline creatinine has been ranging between 1.4 and 1.5.  He has been seen in the nephrology division back in the year 2014.  Lately he is having chest pain off and on and is being considered for cardiac catheterization.  His creatinine was found to have gone up from 1 0.4-1.5 as baseline up to 2.6 earlier this month of January 2019.  Repeat labs show creatinine of 2.3.    January 2019 patient seen for acute kidney injury on chronic kidney disease.  Losartan stop.  Screening for ischemic nephropathy.  Avoid nonsteroidals.  Patient has been taking ibuprofen.    Review of Systems   Constitutional: Negative.  Negative for activity change, appetite change, chills, diaphoresis, fatigue and fever.   HENT: Negative.  Negative for congestion and trouble swallowing.    Eyes: Negative.    Respiratory: Positive for chest tightness and shortness of breath. Negative for cough and wheezing.    Cardiovascular: Positive for chest pain. Negative for palpitations and leg swelling.   Gastrointestinal: Negative.  Negative for abdominal distention, abdominal pain, nausea and vomiting.  "  Genitourinary: Negative.  Negative for decreased urine volume, difficulty urinating, dysuria, enuresis, flank pain, frequency, hematuria, penile swelling, scrotal swelling and urgency.   Musculoskeletal: Negative.  Negative for arthralgias, back pain, joint swelling and neck pain.   Skin: Negative for rash.   Neurological: Negative.  Negative for tremors, seizures and headaches.   Psychiatric/Behavioral: Negative.  Negative for confusion and sleep disturbance. The patient is not nervous/anxious.        Objective:   /60   Pulse 74   Ht 6' 1" (1.854 m)   Wt 90.3 kg (199 lb 1.2 oz)   BMI 26.26 kg/m²      Physical Exam   Constitutional: He is oriented to person, place, and time. He appears well-developed and well-nourished. No distress.   HENT:   Head: Normocephalic.   Eyes: EOM are normal. Pupils are equal, round, and reactive to light.   Neck: Normal range of motion. Neck supple. No JVD present. No thyromegaly present.   Cardiovascular: Normal rate, regular rhythm, S1 normal, S2 normal, normal heart sounds and intact distal pulses. PMI is not displaced. Exam reveals no gallop and no friction rub.   No murmur heard.  Pulmonary/Chest: Effort normal and breath sounds normal. No respiratory distress. He has no wheezes. He has no rales. He exhibits no tenderness.   Abdominal: Soft. Bowel sounds are normal. He exhibits no distension and no mass. There is no hepatosplenomegaly. There is no tenderness. There is no rebound and no CVA tenderness. No hernia.   Positive abdominal bruit   Musculoskeletal: Normal range of motion. He exhibits no edema or tenderness.   Right BKA.  Left foot amputation.  Peripheral arterial disease   Lymphadenopathy:     He has no cervical adenopathy.   Neurological: He is alert and oriented to person, place, and time. He has normal reflexes. He is not disoriented. He displays normal reflexes. No cranial nerve deficit. He exhibits normal muscle tone. Coordination normal.   Skin: Skin is " warm and dry. Capillary refill takes less than 2 seconds. No rash noted. No erythema.   Psychiatric: He has a normal mood and affect. His behavior is normal. Judgment and thought content normal.   Nursing note and vitals reviewed.        Lab Results   Component Value Date    CREATININE 2.3 (H) 01/29/2019    BUN 31 (H) 01/29/2019     01/29/2019    K 4.5 01/29/2019     (H) 01/29/2019    CO2 18 (L) 01/29/2019     Lab Results   Component Value Date    WBC 7.82 01/29/2019    HGB 9.8 (L) 01/29/2019    HCT 30.0 (L) 01/29/2019    MCV 90 01/29/2019     01/29/2019     Lab Results   Component Value Date    CALCIUM 9.0 01/29/2019    PHOS 2.8 08/12/2017     @RESUFAST(URICACID)    Assessment:    )    1. MARCELA (acute kidney injury)    2. Chronic kidney disease (CKD) stage G3a/A1, moderately decreased glomerular filtration rate (GFR) between 45-59 mL/min/1.73 square meter and albuminuria creatinine ratio less than 30 mg/g    3. Horseshoe kidney    4. Ureteral stricture, left    5. Ureteral stent retained    6. PVD (peripheral vascular disease)    7. Essential hypertension        Plan:         1.  Acute kidney injury:  Differential diagnosis includes side-effect of ibuprofen.  Presence of losartan.  DC losartan.  DC ibuprofen.  Hydrate.  Check for ischemic nephropathy.  Check for renal artery stenosis.  Delay cardiac catheterization.    Follow-up in 4 weeks    2.  Chronic kidney disease stage 3:  Likely hypertensive nephropathy.  Check cystatin C on follow-up.  Screen for hyperparathyroidism and vitamin D deficiency.  Avoid nonsteroidals.  Limit IV dye for now.    3.  Nonfunctional left kidney with ureteric stricture status post stent placement.  Repeating renal ultrasound.    4.  Essential hypertension:  Stopping Cozaar.  Check blood pressure at home.  Add Cardura if needed.      Follow-up 4 weeks

## 2019-01-30 LAB
ALBUMIN SERPL ELPH-MCNC: 3.88 G/DL
ALPHA1 GLOB SERPL ELPH-MCNC: 0.42 G/DL
ALPHA2 GLOB SERPL ELPH-MCNC: 0.87 G/DL
B-GLOBULIN SERPL ELPH-MCNC: 1.03 G/DL
GAMMA GLOB SERPL ELPH-MCNC: 1.39 G/DL
KAPPA LC SER QL IA: 14.36 MG/DL
KAPPA LC/LAMBDA SER IA: 2.76
LAMBDA LC SER QL IA: 5.2 MG/DL
PROT SERPL-MCNC: 7.6 G/DL

## 2019-01-31 LAB
INTERPRETATION SERPL IFE-IMP: NORMAL
PATHOLOGIST INTERPRETATION IFE: NORMAL
PATHOLOGIST INTERPRETATION SPE: NORMAL

## 2019-02-05 ENCOUNTER — OFFICE VISIT (OUTPATIENT)
Dept: HEMATOLOGY/ONCOLOGY | Facility: CLINIC | Age: 69
End: 2019-02-05
Payer: MEDICARE

## 2019-02-05 VITALS
OXYGEN SATURATION: 100 % | HEART RATE: 78 BPM | DIASTOLIC BLOOD PRESSURE: 61 MMHG | TEMPERATURE: 97 F | BODY MASS INDEX: 26.18 KG/M2 | SYSTOLIC BLOOD PRESSURE: 146 MMHG | WEIGHT: 197.56 LBS | HEIGHT: 73 IN

## 2019-02-05 DIAGNOSIS — D50.0 ANEMIA DUE TO CHRONIC BLOOD LOSS: ICD-10-CM

## 2019-02-05 DIAGNOSIS — Z89.511 STATUS POST BELOW KNEE AMPUTATION OF RIGHT LOWER EXTREMITY: Chronic | ICD-10-CM

## 2019-02-05 DIAGNOSIS — D50.0 IRON DEFICIENCY ANEMIA DUE TO CHRONIC BLOOD LOSS: Primary | ICD-10-CM

## 2019-02-05 DIAGNOSIS — I73.9 PVD (PERIPHERAL VASCULAR DISEASE): Chronic | ICD-10-CM

## 2019-02-05 DIAGNOSIS — Z86.73 HISTORY OF CVA (CEREBROVASCULAR ACCIDENT): ICD-10-CM

## 2019-02-05 PROCEDURE — 1101F PR PT FALLS ASSESS DOC 0-1 FALLS W/OUT INJ PAST YR: ICD-10-PCS | Mod: HCNC,CPTII,S$GLB, | Performed by: NURSE PRACTITIONER

## 2019-02-05 PROCEDURE — 3077F PR MOST RECENT SYSTOLIC BLOOD PRESSURE >= 140 MM HG: ICD-10-PCS | Mod: HCNC,CPTII,S$GLB, | Performed by: NURSE PRACTITIONER

## 2019-02-05 PROCEDURE — 99999 PR PBB SHADOW E&M-EST. PATIENT-LVL III: ICD-10-PCS | Mod: PBBFAC,HCNC,, | Performed by: NURSE PRACTITIONER

## 2019-02-05 PROCEDURE — 1101F PT FALLS ASSESS-DOCD LE1/YR: CPT | Mod: HCNC,CPTII,S$GLB, | Performed by: NURSE PRACTITIONER

## 2019-02-05 PROCEDURE — 3078F DIAST BP <80 MM HG: CPT | Mod: HCNC,CPTII,S$GLB, | Performed by: NURSE PRACTITIONER

## 2019-02-05 PROCEDURE — 99999 PR PBB SHADOW E&M-EST. PATIENT-LVL III: CPT | Mod: PBBFAC,HCNC,, | Performed by: NURSE PRACTITIONER

## 2019-02-05 PROCEDURE — 3078F PR MOST RECENT DIASTOLIC BLOOD PRESSURE < 80 MM HG: ICD-10-PCS | Mod: HCNC,CPTII,S$GLB, | Performed by: NURSE PRACTITIONER

## 2019-02-05 PROCEDURE — 99214 OFFICE O/P EST MOD 30 MIN: CPT | Mod: HCNC,S$GLB,, | Performed by: NURSE PRACTITIONER

## 2019-02-05 PROCEDURE — 3077F SYST BP >= 140 MM HG: CPT | Mod: HCNC,CPTII,S$GLB, | Performed by: NURSE PRACTITIONER

## 2019-02-05 PROCEDURE — 99214 PR OFFICE/OUTPT VISIT, EST, LEVL IV, 30-39 MIN: ICD-10-PCS | Mod: HCNC,S$GLB,, | Performed by: NURSE PRACTITIONER

## 2019-02-05 RX ORDER — SODIUM CHLORIDE 0.9 % (FLUSH) 0.9 %
10 SYRINGE (ML) INJECTION
Status: CANCELLED | OUTPATIENT
Start: 2019-02-12

## 2019-02-05 RX ORDER — HEPARIN 100 UNIT/ML
5 SYRINGE INTRAVENOUS
Status: CANCELLED | OUTPATIENT
Start: 2019-02-12

## 2019-02-05 RX ORDER — SODIUM CHLORIDE 9 MG/ML
INJECTION, SOLUTION INTRAVENOUS CONTINUOUS
Status: CANCELLED | OUTPATIENT
Start: 2019-02-12

## 2019-02-05 NOTE — PROGRESS NOTES
Subjective:       Patient ID: Kodi Ribeiro is a 69 y.o. male.    Chief Complaint: Anemia and Results    69 year old male, presents for evaluation of anemia. I personally reviewed available records and used this information in my clinical decision making today. Patient has had chronic anemia since 2014. He has had multiple amputation procedures due to PVD and bypass grafts placed by Dr. Jamil and Dr. Hyatt. He reports intermittent chest pain which occurs typically in the afternoon after lying down to rest. He was evaluated by Cardiology and was scheduled for Heart Cath but this was cancelled due to Hgb: 9.4. He was then referred to Hem/Onc for evaluation.     Per review of lab results from initial visit, patient is iron deficient. Will replete with iron. Free LT chains and ratio are elevated.     He had a bone marrow biopsy in 2012:    Bone marrow karyotype results: 46, XY(20), male karyotype.  In situ hybridization for kappa and lambda: No definitive evidence of light chain restriction.      Anemia   There has been no abdominal pain, bruising/bleeding easily, confusion, fever, light-headedness or palpitations.     Review of Systems   Constitutional: Positive for activity change and fatigue. Negative for appetite change, chills, diaphoresis, fever and unexpected weight change.   HENT: Negative for congestion, hearing loss, mouth sores, nosebleeds and trouble swallowing.    Eyes: Negative for discharge and visual disturbance.   Respiratory: Negative for cough, chest tightness and shortness of breath.    Cardiovascular: Negative for chest pain (intermittent, no chest pain at time of visit), palpitations and leg swelling.   Gastrointestinal: Positive for abdominal distention. Negative for abdominal pain, constipation, diarrhea, nausea and vomiting.   Endocrine: Negative for cold intolerance and heat intolerance.   Genitourinary: Negative for difficulty urinating, dysuria and hematuria.   Musculoskeletal:  Positive for arthralgias, back pain, gait problem and myalgias.   Skin: Negative.    Neurological: Negative for dizziness, weakness, light-headedness and headaches.   Hematological: Negative for adenopathy. Does not bruise/bleed easily.   Psychiatric/Behavioral: Negative for agitation, behavioral problems and confusion. The patient is nervous/anxious.        Medication List with Changes/Refills   Current Medications    AMLODIPINE (NORVASC) 10 MG TABLET    TAKE 1 TABLET EVERY DAY    ASPIRIN (ECOTRIN) 81 MG EC TABLET    Take 1 tablet (81 mg total) by mouth once daily.    CARVEDILOL (COREG) 12.5 MG TABLET    Take 1 tablet (12.5 mg total) by mouth 2 (two) times daily with meals.    CLOPIDOGREL (PLAVIX) 75 MG TABLET    TAKE 1 TABLET EVERY DAY    DOCUSATE SODIUM (COLACE) 100 MG CAPSULE    Take 1 capsule (100 mg total) by mouth 2 (two) times daily.    ISOSORBIDE MONONITRATE (IMDUR) 60 MG 24 HR TABLET    Take 1 tablet (60 mg total) by mouth once daily.    NITROGLYCERIN (NITROSTAT) 0.6 MG SUBL    Place 1 tablet (0.6 mg total) under the tongue every 5 (five) minutes as needed (max 3/ per episode).    OXYCODONE-ACETAMINOPHEN (PERCOCET) 5-325 MG PER TABLET    Take 1-2 tablets by mouth every 4 (four) hours as needed for Pain.    PANTOPRAZOLE (PROTONIX) 40 MG TABLET    Take 1 tablet (40 mg total) by mouth once daily.    PRAVASTATIN (PRAVACHOL) 80 MG TABLET    TAKE 1 TABLET EVERY EVENING    TRIAMCINOLONE ACETONIDE 0.025% (KENALOG) 0.025 % CREAM    Apply topically 2 (two) times daily.     Objective:     Vitals:    02/05/19 0804   BP: (!) 146/61   Pulse: 78   Temp: 97.3 °F (36.3 °C)     Physical Exam   Constitutional: He is oriented to person, place, and time. He appears well-developed and well-nourished. No distress.   HENT:   Head: Normocephalic and atraumatic.   Right Ear: Hearing and external ear normal.   Left Ear: Hearing and external ear normal.   Nose: Nose normal. No mucosal edema or rhinorrhea. No epistaxis.    Mouth/Throat: Uvula is midline, oropharynx is clear and moist and mucous membranes are normal.   Eyes: Conjunctivae and EOM are normal. Pupils are equal, round, and reactive to light. Right eye exhibits no chemosis and no discharge. Left eye exhibits no chemosis and no discharge.   Neck: Trachea normal and normal range of motion. Neck supple. No thyroid mass and no thyromegaly present.   Cardiovascular: Normal rate, regular rhythm, normal heart sounds and intact distal pulses.   No murmur heard.  Pulses:       Dorsalis pedis pulses are 2+ on the right side, and 2+ on the left side.   Pulmonary/Chest: Effort normal and breath sounds normal. No respiratory distress. He has no decreased breath sounds. He has no wheezes.   Abdominal: Soft. Bowel sounds are normal. He exhibits distension. There is no tenderness.   Musculoskeletal: Normal range of motion. He exhibits no edema or tenderness.   Right BKA     Lymphadenopathy:     He has no cervical adenopathy.     He has no axillary adenopathy.        Right: No supraclavicular adenopathy present.        Left: No supraclavicular adenopathy present.   Neurological: He is alert and oriented to person, place, and time. He has normal strength. No cranial nerve deficit. Coordination normal.   Skin: Skin is warm, dry and intact. Capillary refill takes less than 2 seconds. No rash noted. He is not diaphoretic. No erythema. There is pallor.   Psychiatric: His speech is normal and behavior is normal. Judgment and thought content normal. His mood appears anxious. Cognition and memory are normal.   Vitals reviewed.      Assessment:       Problem List Items Addressed This Visit        Oncology    Iron deficiency anemia due to chronic blood loss    Relevant Orders    Immunofixation, 24 hour Urine    Protein electrophoresis, timed urine      Other Visit Diagnoses     Anemia due to chronic blood loss    -  Primary    Relevant Orders    Immunofixation, 24 hour Urine    Protein  electrophoresis, timed urine          Plan:       1) Iron deficient- will replete with IV Injectafer x2, Will return to clinic 4 weeks after treatment complete with labs.  2) 24 hour urine collection- SPEP and Protein immunofixation. Will discuss results when available.  3) Patient would like to wait on scheduling a bone marrow biopsy at this time pending results of 24 hour urine collection. We discussed the risks of waiting, he is aware of the risk but would still prefer to wait.     I will review assessment/plan with collaborating physician Dr. Merlos.

## 2019-02-05 NOTE — PATIENT INSTRUCTIONS
Iron-Deficiency Anemia (Adult)  Red blood cells carry oxygen to the tissues of your body. Anemia is a condition in which you have too few red blood cells. You need iron to make red cells. Anemia makes you feel tired and run down. When anemia becomes severe, your skin becomes pale. You may feel short of breath after physical activity. Other symptoms include:  · Headaches  · Dizziness  · Leg cramps with physical activity  · Drowsiness  · Restless legs  Your anemia is caused by not having enough iron in your body. This may be because of:  · Loss of blood. This can be caused by heavy menstrual periods. It can also be caused by bleeding from the stomach or intestines.  · Poor diet. You may not be eating enough foods that contain iron.  · Inability to absorb iron from the foods you eat  · Pregnancy  If your blood count is low enough, your healthcare provider may prescribe an iron supplement. It usually takes about 2 to 3 months of treatment with iron supplements to correct anemia. Severe cases of anemia need a blood transfusion to quickly ease symptoms and deliver more oxygen to the cells.  Home care  Follow these guidelines when caring for yourself at home:  · Eat foods high in iron. This will boost the amount of iron stored in your body. It is a natural way to build up the number of blood cells. Good sources of iron include beef, liver, spinach and other dark green leafy vegetables, whole grains, beans, and nuts.  · Do not overexert yourself.  · Talk with your healthcare provider before traveling by air or traveling to high altitudes.  Follow-up care  Follow up with your healthcare provider in 2 months, or as advised. This is to have another red blood cell count to be sure your anemia has been fixed.  When to seek medical advice  Call your healthcare provider right away if any of these occur:  · Shortness of breath or chest pain  · Dizziness or fainting  · Vomiting blood or passing red or black-colored stool   Date  Last Reviewed: 2/25/2016  © 5612-7120 [employer] [address] [place] [address], [place] [address]. All rights reserved. This information is not intended as a substitute for professional medical care. Always follow your healthcare professional's instructions.

## 2019-02-07 ENCOUNTER — LAB VISIT (OUTPATIENT)
Dept: LAB | Facility: HOSPITAL | Age: 69
End: 2019-02-07
Attending: NURSE PRACTITIONER
Payer: MEDICARE

## 2019-02-07 DIAGNOSIS — D50.0 IRON DEFICIENCY ANEMIA DUE TO CHRONIC BLOOD LOSS: ICD-10-CM

## 2019-02-07 DIAGNOSIS — D50.0 ANEMIA DUE TO CHRONIC BLOOD LOSS: ICD-10-CM

## 2019-02-07 LAB
PROT 24H UR-MRATE: 3238 MG/SPEC
PROT UR-MCNC: 175 MG/DL
URINE COLLECTION DURATION: 24 HR
URINE VOLUME: 1850 ML

## 2019-02-07 PROCEDURE — 86335 IMMUNFIX E-PHORSIS/URINE/CSF: CPT | Mod: 26,HCNC,, | Performed by: PATHOLOGY

## 2019-02-07 PROCEDURE — 86335 PATHOLOGIST INTERPRETATION UIFE: ICD-10-PCS | Mod: 26,HCNC,, | Performed by: PATHOLOGY

## 2019-02-07 PROCEDURE — 84166 PATHOLOGIST INTERPRETATION UPE: ICD-10-PCS | Mod: 26,HCNC,, | Performed by: PATHOLOGY

## 2019-02-07 PROCEDURE — 84166 PROTEIN E-PHORESIS/URINE/CSF: CPT | Mod: HCNC

## 2019-02-07 PROCEDURE — 84156 ASSAY OF PROTEIN URINE: CPT | Mod: HCNC

## 2019-02-07 PROCEDURE — 84166 PROTEIN E-PHORESIS/URINE/CSF: CPT | Mod: 26,HCNC,, | Performed by: PATHOLOGY

## 2019-02-07 PROCEDURE — 86335 IMMUNFIX E-PHORSIS/URINE/CSF: CPT | Mod: HCNC

## 2019-02-11 LAB
PATHOLOGIST INTERPRETATION UPE: NORMAL
PROT PATTERN UR ELPH-IMP: NORMAL

## 2019-02-12 ENCOUNTER — INFUSION (OUTPATIENT)
Dept: INFUSION THERAPY | Facility: HOSPITAL | Age: 69
End: 2019-02-12
Attending: INTERNAL MEDICINE
Payer: MEDICARE

## 2019-02-12 VITALS
TEMPERATURE: 97 F | HEART RATE: 66 BPM | DIASTOLIC BLOOD PRESSURE: 67 MMHG | SYSTOLIC BLOOD PRESSURE: 137 MMHG | OXYGEN SATURATION: 97 % | RESPIRATION RATE: 18 BRPM

## 2019-02-12 DIAGNOSIS — D50.0 IRON DEFICIENCY ANEMIA DUE TO CHRONIC BLOOD LOSS: Primary | ICD-10-CM

## 2019-02-12 LAB
INTERPRETATION UR IFE-IMP: NORMAL
PATHOLOGIST INTERPRETATION UIFE: NORMAL

## 2019-02-12 PROCEDURE — 63600175 PHARM REV CODE 636 W HCPCS: Mod: HCNC,JG | Performed by: NURSE PRACTITIONER

## 2019-02-12 PROCEDURE — 96365 THER/PROPH/DIAG IV INF INIT: CPT | Mod: HCNC

## 2019-02-12 PROCEDURE — 25000003 PHARM REV CODE 250: Mod: HCNC | Performed by: NURSE PRACTITIONER

## 2019-02-12 RX ORDER — HEPARIN 100 UNIT/ML
5 SYRINGE INTRAVENOUS
Status: CANCELLED | OUTPATIENT
Start: 2019-02-12

## 2019-02-12 RX ORDER — SODIUM CHLORIDE 0.9 % (FLUSH) 0.9 %
10 SYRINGE (ML) INJECTION
Status: CANCELLED | OUTPATIENT
Start: 2019-02-12

## 2019-02-12 RX ORDER — SODIUM CHLORIDE 9 MG/ML
INJECTION, SOLUTION INTRAVENOUS CONTINUOUS
Status: CANCELLED | OUTPATIENT
Start: 2019-02-12

## 2019-02-12 RX ORDER — SODIUM CHLORIDE 9 MG/ML
INJECTION, SOLUTION INTRAVENOUS CONTINUOUS
Status: DISCONTINUED | OUTPATIENT
Start: 2019-02-12 | End: 2019-02-12 | Stop reason: CLARIF

## 2019-02-12 RX ADMIN — FERRIC CARBOXYMALTOSE INJECTION 750 MG: 50 INJECTION, SOLUTION INTRAVENOUS at 01:02

## 2019-02-12 NOTE — PLAN OF CARE
Problem: Adult Inpatient Plan of Care  Goal: Plan of Care Review  Outcome: Ongoing (interventions implemented as appropriate)  Pt stated feeling tired and weak today .

## 2019-02-19 ENCOUNTER — INFUSION (OUTPATIENT)
Dept: INFUSION THERAPY | Facility: HOSPITAL | Age: 69
End: 2019-02-19
Attending: INTERNAL MEDICINE
Payer: MEDICARE

## 2019-02-19 VITALS
HEART RATE: 71 BPM | SYSTOLIC BLOOD PRESSURE: 146 MMHG | TEMPERATURE: 98 F | DIASTOLIC BLOOD PRESSURE: 72 MMHG | RESPIRATION RATE: 18 BRPM | OXYGEN SATURATION: 99 %

## 2019-02-19 DIAGNOSIS — D50.0 IRON DEFICIENCY ANEMIA DUE TO CHRONIC BLOOD LOSS: Primary | ICD-10-CM

## 2019-02-19 PROCEDURE — 96365 THER/PROPH/DIAG IV INF INIT: CPT | Mod: HCNC

## 2019-02-19 PROCEDURE — 63600175 PHARM REV CODE 636 W HCPCS: Mod: HCNC,JG | Performed by: NURSE PRACTITIONER

## 2019-02-19 PROCEDURE — 25000003 PHARM REV CODE 250: Mod: HCNC | Performed by: NURSE PRACTITIONER

## 2019-02-19 RX ORDER — SODIUM CHLORIDE 0.9 % (FLUSH) 0.9 %
10 SYRINGE (ML) INJECTION
Status: CANCELLED | OUTPATIENT
Start: 2019-02-19

## 2019-02-19 RX ORDER — HEPARIN 100 UNIT/ML
5 SYRINGE INTRAVENOUS
Status: CANCELLED | OUTPATIENT
Start: 2019-02-19

## 2019-02-19 RX ORDER — SODIUM CHLORIDE 9 MG/ML
INJECTION, SOLUTION INTRAVENOUS CONTINUOUS
Status: CANCELLED | OUTPATIENT
Start: 2019-02-19

## 2019-02-19 RX ORDER — SODIUM CHLORIDE 9 MG/ML
INJECTION, SOLUTION INTRAVENOUS ONCE
Status: DISCONTINUED | OUTPATIENT
Start: 2019-02-19 | End: 2019-02-19 | Stop reason: HOSPADM

## 2019-02-19 RX ADMIN — FERRIC CARBOXYMALTOSE INJECTION 750 MG: 50 INJECTION, SOLUTION INTRAVENOUS at 01:02

## 2019-02-19 NOTE — DISCHARGE INSTRUCTIONS
[facility]  [address]  [address] [address]  [phone] phone     [phone] fax  Hours of Operation: Monday- Friday 8:00am- 5:00pm  After hours phone  [phone]  Hematology / Oncology Physicians on call      Dr. Gaurang Ríos                        Please call with any concerns regarding your appointment today.  FALL PREVENTION   Falls often occur due to slipping, tripping or losing your balance. Here are ways to reduce your risk of falling again.   Was there anything that caused your fall that can be fixed, removed or replaced?   Make your home safe by keeping walkways clear of objects you may trip over.   Use non-slip pads under rugs.   Do not walk in poorly lit areas.   Do not stand on chairs or wobbly ladders.   Use caution when reaching overhead or looking upward. This position can cause a loss of balance.   Be sure your shoes fit properly, have non-slip bottoms and are in good condition.   Be cautious when going up and down stairs, curbs, and when walking on uneven sidewalks.   If your balance is poor, consider using a cane or walker.   If your fall was related to alcohol use, stop or limit alcohol intake.   If your fall was related to use of sleeping medicines, talk to your doctor about this. You may need to reduce your dosage at bedtime if you awaken during the night to go to the bathroom.   To reduce the need for nighttime bathroom trips:   Avoid drinking fluids for several hours before going to bed   Empty your bladder before going to bed   Men can keep a urinal at the bedside   © 9182-9665 [employer], [address] [place] [address], [place] [address]. All rights reserved. This information is not intended as a substitute for professional medical care. Always follow your healthcare professional's instructions.

## 2019-02-19 NOTE — PLAN OF CARE
Problem: Adult Inpatient Plan of Care  Goal: Plan of Care Review  Outcome: Ongoing (interventions implemented as appropriate)  Pt states has no complaints today

## 2019-02-21 ENCOUNTER — HOSPITAL ENCOUNTER (OUTPATIENT)
Dept: RADIOLOGY | Facility: HOSPITAL | Age: 69
Discharge: HOME | End: 2019-02-21
Attending: INTERNAL MEDICINE
Payer: MEDICARE

## 2019-02-21 DIAGNOSIS — N17.9 AKI (ACUTE KIDNEY INJURY): ICD-10-CM

## 2019-02-21 DIAGNOSIS — N18.31 CHRONIC KIDNEY DISEASE (CKD) STAGE G3A/A1, MODERATELY DECREASED GLOMERULAR FILTRATION RATE (GFR) BETWEEN 45-59 ML/MIN/1.73 SQUARE METER AND ALBUMINURIA CREATININE RATIO LESS THAN 30 MG/G: ICD-10-CM

## 2019-02-21 PROCEDURE — 93975 VASCULAR STUDY: CPT | Mod: 26,HCNC,, | Performed by: RADIOLOGY

## 2019-02-21 PROCEDURE — 93975 VASCULAR STUDY: CPT | Mod: TC,HCNC

## 2019-02-21 PROCEDURE — 93975 US RENAL ARTERY STENOSIS HYPERTEN (XPD): ICD-10-PCS | Mod: 26,HCNC,, | Performed by: RADIOLOGY

## 2019-02-25 ENCOUNTER — HOSPITAL ENCOUNTER (EMERGENCY)
Facility: HOSPITAL | Age: 69
Discharge: HOME | End: 2019-02-26
Attending: EMERGENCY MEDICINE
Payer: MEDICARE

## 2019-02-25 DIAGNOSIS — R07.9 CHEST PAIN, UNSPECIFIED TYPE: Primary | ICD-10-CM

## 2019-02-25 DIAGNOSIS — I25.2 OLD MI (MYOCARDIAL INFARCTION): Chronic | ICD-10-CM

## 2019-02-25 DIAGNOSIS — N18.30 CKD (CHRONIC KIDNEY DISEASE) STAGE 3, GFR 30-59 ML/MIN: Chronic | ICD-10-CM

## 2019-02-25 DIAGNOSIS — R07.9 CHEST PAIN: ICD-10-CM

## 2019-02-25 PROCEDURE — 93005 ELECTROCARDIOGRAM TRACING: CPT | Mod: HCNC

## 2019-02-25 PROCEDURE — 99285 EMERGENCY DEPT VISIT HI MDM: CPT | Mod: 25,HCNC

## 2019-02-26 ENCOUNTER — TELEPHONE (OUTPATIENT)
Dept: CARDIOLOGY | Facility: HOSPITAL | Age: 69
End: 2019-02-26

## 2019-02-26 ENCOUNTER — OFFICE VISIT (OUTPATIENT)
Dept: NEPHROLOGY | Facility: CLINIC | Age: 69
End: 2019-02-26
Payer: MEDICARE

## 2019-02-26 VITALS
HEIGHT: 73 IN | RESPIRATION RATE: 23 BRPM | DIASTOLIC BLOOD PRESSURE: 78 MMHG | SYSTOLIC BLOOD PRESSURE: 166 MMHG | WEIGHT: 195.31 LBS | OXYGEN SATURATION: 96 % | TEMPERATURE: 98 F | BODY MASS INDEX: 25.88 KG/M2 | HEART RATE: 80 BPM

## 2019-02-26 VITALS
HEART RATE: 80 BPM | SYSTOLIC BLOOD PRESSURE: 160 MMHG | DIASTOLIC BLOOD PRESSURE: 62 MMHG | BODY MASS INDEX: 26.03 KG/M2 | HEIGHT: 73 IN | WEIGHT: 196.44 LBS

## 2019-02-26 DIAGNOSIS — D63.1 ANEMIA OF CHRONIC RENAL FAILURE, STAGE 4 (SEVERE): ICD-10-CM

## 2019-02-26 DIAGNOSIS — E55.9 VITAMIN D DEFICIENCY: ICD-10-CM

## 2019-02-26 DIAGNOSIS — Q63.1 HORSESHOE KIDNEY: ICD-10-CM

## 2019-02-26 DIAGNOSIS — I73.9 PVD (PERIPHERAL VASCULAR DISEASE): ICD-10-CM

## 2019-02-26 DIAGNOSIS — N25.81 HYPERPARATHYROIDISM, SECONDARY RENAL: ICD-10-CM

## 2019-02-26 DIAGNOSIS — N18.4 ANEMIA OF CHRONIC RENAL FAILURE, STAGE 4 (SEVERE): ICD-10-CM

## 2019-02-26 DIAGNOSIS — N18.4 CKD STAGE G4/A3, GFR 15-29 AND ALBUMIN CREATININE RATIO >300 MG/G: Primary | ICD-10-CM

## 2019-02-26 DIAGNOSIS — I10 ESSENTIAL HYPERTENSION: ICD-10-CM

## 2019-02-26 DIAGNOSIS — N13.5 URETERAL STRICTURE, LEFT: ICD-10-CM

## 2019-02-26 DIAGNOSIS — R80.1 PERSISTENT PROTEINURIA: ICD-10-CM

## 2019-02-26 LAB
ALBUMIN SERPL BCP-MCNC: 3.5 G/DL
ALP SERPL-CCNC: 160 U/L
ALT SERPL W/O P-5'-P-CCNC: 10 U/L
ANION GAP SERPL CALC-SCNC: 9 MMOL/L
APTT BLDCRRT: 30.3 SEC
AST SERPL-CCNC: 14 U/L
BASOPHILS # BLD AUTO: 0.03 K/UL
BASOPHILS NFR BLD: 0.4 %
BILIRUB SERPL-MCNC: 0.4 MG/DL
BNP SERPL-MCNC: 424 PG/ML
BUN SERPL-MCNC: 29 MG/DL
CALCIUM SERPL-MCNC: 8.2 MG/DL
CHLORIDE SERPL-SCNC: 113 MMOL/L
CK MB SERPL-MCNC: 3.1 NG/ML
CK MB SERPL-RTO: 2.2 %
CK SERPL-CCNC: 144 U/L
CK SERPL-CCNC: 144 U/L
CO2 SERPL-SCNC: 15 MMOL/L
CREAT SERPL-MCNC: 2.4 MG/DL
DIFFERENTIAL METHOD: ABNORMAL
EOSINOPHIL # BLD AUTO: 0.3 K/UL
EOSINOPHIL NFR BLD: 4 %
ERYTHROCYTE [DISTWIDTH] IN BLOOD BY AUTOMATED COUNT: 15.6 %
EST. GFR  (AFRICAN AMERICAN): 30 ML/MIN/1.73 M^2
EST. GFR  (NON AFRICAN AMERICAN): 26 ML/MIN/1.73 M^2
GLUCOSE SERPL-MCNC: 102 MG/DL
HCT VFR BLD AUTO: 29.9 %
HGB BLD-MCNC: 9.8 G/DL
INR PPP: 1
LYMPHOCYTES # BLD AUTO: 1.3 K/UL
LYMPHOCYTES NFR BLD: 17.1 %
MCH RBC QN AUTO: 29.3 PG
MCHC RBC AUTO-ENTMCNC: 32.8 G/DL
MCV RBC AUTO: 89 FL
MONOCYTES # BLD AUTO: 0.7 K/UL
MONOCYTES NFR BLD: 9.3 %
NEUTROPHILS # BLD AUTO: 5.4 K/UL
NEUTROPHILS NFR BLD: 69.2 %
PLATELET # BLD AUTO: 166 K/UL
PMV BLD AUTO: 9.3 FL
POTASSIUM SERPL-SCNC: 4 MMOL/L
PROT SERPL-MCNC: 7.5 G/DL
PROTHROMBIN TIME: 10.8 SEC
RBC # BLD AUTO: 3.35 M/UL
SODIUM SERPL-SCNC: 137 MMOL/L
TROPONIN I SERPL DL<=0.01 NG/ML-MCNC: 0.01 NG/ML
WBC # BLD AUTO: 7.82 K/UL

## 2019-02-26 PROCEDURE — 85730 THROMBOPLASTIN TIME PARTIAL: CPT | Mod: HCNC

## 2019-02-26 PROCEDURE — 3077F PR MOST RECENT SYSTOLIC BLOOD PRESSURE >= 140 MM HG: ICD-10-PCS | Mod: HCNC,CPTII,S$GLB, | Performed by: INTERNAL MEDICINE

## 2019-02-26 PROCEDURE — 1101F PT FALLS ASSESS-DOCD LE1/YR: CPT | Mod: HCNC,CPTII,S$GLB, | Performed by: INTERNAL MEDICINE

## 2019-02-26 PROCEDURE — 1101F PR PT FALLS ASSESS DOC 0-1 FALLS W/OUT INJ PAST YR: ICD-10-PCS | Mod: HCNC,CPTII,S$GLB, | Performed by: INTERNAL MEDICINE

## 2019-02-26 PROCEDURE — 93010 ELECTROCARDIOGRAM REPORT: CPT | Mod: HCNC,,, | Performed by: INTERNAL MEDICINE

## 2019-02-26 PROCEDURE — 99499 UNLISTED E&M SERVICE: CPT | Mod: S$GLB,,, | Performed by: INTERNAL MEDICINE

## 2019-02-26 PROCEDURE — 99214 OFFICE O/P EST MOD 30 MIN: CPT | Mod: HCNC,S$GLB,, | Performed by: INTERNAL MEDICINE

## 2019-02-26 PROCEDURE — 99499 RISK ADDL DX/OHS AUDIT: ICD-10-PCS | Mod: S$GLB,,, | Performed by: INTERNAL MEDICINE

## 2019-02-26 PROCEDURE — 84484 ASSAY OF TROPONIN QUANT: CPT | Mod: HCNC

## 2019-02-26 PROCEDURE — 83880 ASSAY OF NATRIURETIC PEPTIDE: CPT | Mod: HCNC

## 2019-02-26 PROCEDURE — 3078F PR MOST RECENT DIASTOLIC BLOOD PRESSURE < 80 MM HG: ICD-10-PCS | Mod: HCNC,CPTII,S$GLB, | Performed by: INTERNAL MEDICINE

## 2019-02-26 PROCEDURE — 85610 PROTHROMBIN TIME: CPT | Mod: HCNC

## 2019-02-26 PROCEDURE — 82550 ASSAY OF CK (CPK): CPT | Mod: HCNC

## 2019-02-26 PROCEDURE — 80053 COMPREHEN METABOLIC PANEL: CPT | Mod: HCNC

## 2019-02-26 PROCEDURE — 3078F DIAST BP <80 MM HG: CPT | Mod: HCNC,CPTII,S$GLB, | Performed by: INTERNAL MEDICINE

## 2019-02-26 PROCEDURE — 99214 PR OFFICE/OUTPT VISIT, EST, LEVL IV, 30-39 MIN: ICD-10-PCS | Mod: HCNC,S$GLB,, | Performed by: INTERNAL MEDICINE

## 2019-02-26 PROCEDURE — 82553 CREATINE MB FRACTION: CPT | Mod: HCNC

## 2019-02-26 PROCEDURE — 85025 COMPLETE CBC W/AUTO DIFF WBC: CPT | Mod: HCNC

## 2019-02-26 PROCEDURE — 99999 PR PBB SHADOW E&M-EST. PATIENT-LVL III: CPT | Mod: PBBFAC,HCNC,, | Performed by: INTERNAL MEDICINE

## 2019-02-26 PROCEDURE — 99999 PR PBB SHADOW E&M-EST. PATIENT-LVL III: ICD-10-PCS | Mod: PBBFAC,HCNC,, | Performed by: INTERNAL MEDICINE

## 2019-02-26 PROCEDURE — 36415 COLL VENOUS BLD VENIPUNCTURE: CPT | Mod: HCNC

## 2019-02-26 PROCEDURE — 93010 EKG 12-LEAD: ICD-10-PCS | Mod: HCNC,,, | Performed by: INTERNAL MEDICINE

## 2019-02-26 PROCEDURE — 3077F SYST BP >= 140 MM HG: CPT | Mod: HCNC,CPTII,S$GLB, | Performed by: INTERNAL MEDICINE

## 2019-02-26 RX ORDER — ERGOCALCIFEROL 1.25 MG/1
50000 CAPSULE ORAL
Qty: 12 CAPSULE | Refills: 5 | Status: SHIPPED | OUTPATIENT
Start: 2019-02-26 | End: 2019-05-27

## 2019-02-26 RX ORDER — LOSARTAN POTASSIUM 100 MG/1
100 TABLET ORAL DAILY
Qty: 90 TABLET | Refills: 3 | Status: ON HOLD | OUTPATIENT
Start: 2019-02-26 | End: 2020-02-06 | Stop reason: HOSPADM

## 2019-02-26 NOTE — ED NOTES
Discharge instructions explained to patient with verbalization of understanding of instructions.  Pt escorted per wc to registration desk by RN. Discharge paperwork given to registration for completion of discharge.

## 2019-02-26 NOTE — ED NOTES
Armband checked, patient verified. Verified by spelling and stated name on armband along with .   LOC: The patient is awake, alert and aware of environment with an appropriate affect, the patient is oriented x 3 and speaking appropriately.  APPEARANCE: Patient resting comfortably and in no acute distress, patient is clean and well groomed  SKIN: The skin is warm and dry, color consistent with ethnicity, patient has normal skin turgor and moist mucus membranes, no breakdown or bruising noted.   MUSCULOSKELETAL: Patient moving all extremities to command. Pt is BKA right leg.   RESPIRATORY: Airway is open and patent, respirations are regular, even and non labored.  CARDIAC: Patient has a normal rate, no periphreal edema noted, capillary refill < 3 seconds.  ABDOMEN: Soft and non tender to palpation.  Pt states has been having chest pain off and on for a month. States last night started with chest pain at 4pm and took NTG which relieved pain. Had another episode at 9pm and took NTG x 3 with relief. States no pain now but was told to come to ER if has to take 3 NTG. Denies SOB, pain or nausea at this time.   Wife at bedside, SR up x 2 with call light in reach.

## 2019-02-26 NOTE — PROGRESS NOTES
Subjective:       Patient ID: Kodi Ribeiro is a 69 y.o. White male who presents for follow-up evaluation of Chronic Kidney Disease; Hypertension; and Proteinuria    Hypertension   Associated symptoms include chest pain and shortness of breath. Pertinent negatives include no headaches, neck pain or palpitations.        Patient is a 69-year-old male with longstanding history of hypertension and peripheral arterial disease with multiple complications including AAA.  Patient had a AAA repair.  Also had complications of left-sided ureteric stricture.  Patient has a history of horseshoe kidney.  The 2 kidneys were  .  He chronically has left-sided ureteric stent replaced by Dr. Jin yearly.  Majority of his kidney function comes from the right kidney.  Left kidney seems to have minimal kidney function.  Has had complications of pyelonephritis on the left kidney.  Patient has severe peripheral artery disease status post a right BKA and left foot amputation.  He has quit smoking.  His baseline creatinine has been ranging between 1.4 and 1.5.  He has been seen in the nephrology division back in the year 2014.  Lately he is having chest pain off and on and is being considered for cardiac catheterization.  His creatinine was found to have gone up from 1 0.4-1.5 as baseline up to 2.6 earlier this month of January 2019.  Repeat labs show creatinine of 2.3.    January 2019 patient seen for acute kidney injury on chronic kidney disease.  Losartan stop.  Screening for ischemic nephropathy.  Avoid nonsteroidals.  Patient has been taking ibuprofen.    February 2019.  Ultrasound reviewed.  No definite of  renal artery stenosis.  Heavy proteinuria.  GFR 25% with creatinine of 2.4.  .  Vitamin-D 6, anemia    Review of Systems   Constitutional: Negative.  Negative for activity change, appetite change, chills, diaphoresis, fatigue and fever.   HENT: Negative.  Negative for congestion and trouble swallowing.   "  Eyes: Negative.    Respiratory: Positive for chest tightness and shortness of breath. Negative for cough and wheezing.    Cardiovascular: Positive for chest pain. Negative for palpitations and leg swelling.   Gastrointestinal: Negative.  Negative for abdominal distention, abdominal pain, nausea and vomiting.   Genitourinary: Negative.  Negative for decreased urine volume, difficulty urinating, dysuria, enuresis, flank pain, frequency, hematuria, penile swelling, scrotal swelling and urgency.   Musculoskeletal: Negative.  Negative for arthralgias, back pain, joint swelling and neck pain.   Skin: Negative for rash.   Neurological: Negative.  Negative for tremors, seizures and headaches.   Psychiatric/Behavioral: Negative.  Negative for confusion and sleep disturbance. The patient is not nervous/anxious.        Objective:   BP (!) 160/62   Pulse 80   Ht 6' 1" (1.854 m)   Wt 89.1 kg (196 lb 6.9 oz)   BMI 25.92 kg/m²      Physical Exam   Constitutional: He is oriented to person, place, and time. He appears well-developed and well-nourished. No distress.   HENT:   Head: Normocephalic.   Eyes: EOM are normal. Pupils are equal, round, and reactive to light.   Neck: Normal range of motion. Neck supple. No JVD present. No thyromegaly present.   Cardiovascular: Normal rate, regular rhythm, S1 normal, S2 normal, normal heart sounds and intact distal pulses. PMI is not displaced. Exam reveals no gallop and no friction rub.   No murmur heard.  Pulmonary/Chest: Effort normal and breath sounds normal. No respiratory distress. He has no wheezes. He has no rales. He exhibits no tenderness.   Abdominal: Soft. Bowel sounds are normal. He exhibits no distension and no mass. There is no hepatosplenomegaly. There is no tenderness. There is no rebound and no CVA tenderness. No hernia.   Positive abdominal bruit   Musculoskeletal: Normal range of motion. He exhibits no edema or tenderness.   Right BKA.  Left foot amputation.  " Peripheral arterial disease   Lymphadenopathy:     He has no cervical adenopathy.   Neurological: He is alert and oriented to person, place, and time. He has normal reflexes. He is not disoriented. He displays normal reflexes. No cranial nerve deficit. He exhibits normal muscle tone. Coordination normal.   Skin: Skin is warm and dry. Capillary refill takes less than 2 seconds. No rash noted. No erythema.   Psychiatric: He has a normal mood and affect. His behavior is normal. Judgment and thought content normal.   Nursing note and vitals reviewed.        Lab Results   Component Value Date    CREATININE 2.4 (H) 02/26/2019    BUN 29 (H) 02/26/2019     02/26/2019    K 4.0 02/26/2019     (H) 02/26/2019    CO2 15 (L) 02/26/2019     Lab Results   Component Value Date    WBC 7.82 02/26/2019    HGB 9.8 (L) 02/26/2019    HCT 29.9 (L) 02/26/2019    MCV 89 02/26/2019     02/26/2019     Lab Results   Component Value Date    .0 (H) 02/21/2019    CALCIUM 8.2 (L) 02/26/2019    PHOS 2.1 (L) 02/21/2019     @RESUFAST(URICACID)    Assessment:    )    1. CKD stage G4/A3, GFR 15-29 and albumin creatinine ratio >300 mg/g    2. Horseshoe kidney    3. Ureteral stricture, left    4. Essential hypertension    5. PVD (peripheral vascular disease)    6. Persistent proteinuria        Plan:         1.  Chronic kidney disease stage 4:  Likely hypertensive nephropathy.  GFR 25%.  Heavy proteinuria.  Likely due to hypertensive nephropathy.  Multiple complications of chronic kidney disease including vitamin-D deficiency, secondary hyperparathyroidism, anemia of chronic kidney disease as well.  Higher risk of complications with cardiac catheterization explained and discussed with details.  Avoid nonsteroidals.  Limit IV dye d/w patietn ( staged PCI if needed ) .  Perioperative IV fluids recommended. Hold losartan one day before the Left heart catheterization     3.  Nonfunctional left kidney with ureteric stricture status  post stent placement.  Repeat ultrasound shows no hydronephrosis in February 2019    4.  Essential hypertension:  Blood pressure target would be 130.  Will add losartan 32 mg daily.    5.  Heavy proteinuria most likely hypertensive nephropathy:  Restart losartan for proteinuria and hypertension uncontrolled    6.  Anemia due to chronic kidney disease:  S/p Consult Hematology , s/p IV iron x outpatient     7.  Secondary hyperparathyroidism with severe vitamin-D deficiency:  Start on ergocalciferol weekly          Follow-up 2-3 months

## 2019-02-26 NOTE — ED PROVIDER NOTES
SCRIBE #1 NOTE: I, Nelida Chavez, am scribing for, and in the presence of, Lázaro Jaramillo MD. I have scribed the entire note.          History     Chief Complaint   Patient presents with    Chest Pain     with shortness of breath that has resolved     Review of patient's allergies indicates:   Allergen Reactions    Morphine Itching         History of Present Illness     HPI    2/26/2019, 11:02 PM  History obtained from the patient      History of Present Illness: Kodi Ribeiro is a 69 y.o. male patient with a PMHx of HLD, HTN, CAD, peripheral vascular disease and SHx of AAA repair and coronary angioplasty who presents to the Emergency Department for evaluation of CP which onset suddenly tonight about 5 hours PTA while eating dinner. Symptoms are intermittent and moderate in severity. Pt states he had 3 episodes tonight PTA and took 1 NTG after each episode. Sxs have resolved since arriving in ED. No modifying factors. Associated sxs include SOB for 2 days and high BP. Patient denies any palpitations, cough, leg pain/swelling, fever, chills, dizziness, diaphoresis, N/V, and all other sxs at this time. Pt has experienced these CP intermittently throughout the last month. He recently had 2 Iron infusions within the last 2 weeks and states the sxs had gone away until tonight. No further complaints or concerns at this time.         Arrival mode:  EMS    PCP: Norma Nuñez MD        Past Medical History:  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    Late complications of amputation stump      rseolved with further amputation( MRSA then none since 2014)    Lipoma of colon     colonoscopy 2/21/2014    Myocardial infarction     per patient 2000 & 9/2012    Peripheral vascular disease     Phantom limb syndrome     patient reports only intermittent not problematic, not worsening    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    Spinal cord disease     L4L5 disc    Stroke     Tobacco dependence     resolved    Ureteral stent retained        Past Surgical History:  Past Surgical History:   Procedure Laterality Date    ABDOMINAL AORTIC ANEURYSM REPAIR      ABDOMINAL AORTIC ANEURYSM REPAIR  1996/2014    AMPUTATION, LOWER LIMB      AORTA - BILATERAL FEMORAL ARTERY BYPASS GRAFT  2014    Left and right leg    COLONOSCOPY N/A 2/21/2014    Performed by [facility]anner [facility] at [facility] ENDO    CORONARY ANGIOPLASTY WITH STENT PLACEMENT  2000    Three placed in heart    CREATION, BYPASS, ARTERIAL, AORTA TO FEMORAL, BILATERAL Right 3/16/2014    Performed by Stefan Jamil [facility] at [facility] OR    CYSTOSCOPY WITH STENT EXCHANGE/RETROGRADE PYELOGRAM Left 4/2/2015    Performed by Scooter Jin IV, MD at [facility] OR    CYSTOSCOPY WITH STENT PLACEMENT Left 5/4/2017    Performed by Scooter [facility] IV, MD at [facility] OR    CYSTOSCOPY WITH STENT PLACEMENT Left 4/28/2016    Performed by Scooter [facility] IV, MD at [facility] OR    CYSTOSCOPY, WITH RETROGRADE PYELOGRAM Left 5/29/2018    Performed by Scooter [facility] IV, [facility] at [facility] OR    CYSTOSCOPY, WITH URETERAL STENT INSERTION Left 5/29/2018    Performed by Scooter [facility] IV, MD at [facility] OR    CYSTOSCOPY, WITH URETERAL STENT INSERTION Left 1/23/2014    Performed by Scooter [facility] IV, MD at [facility] OR    CYSTOSCOPY, WITH URETERAL STENT REMOVAL Left 5/29/2018    Performed by Scooter Jin IV, MD at [facility] OR    EGD (ESOPHAGOGASTRODUODENOSCOPY) N/A 2/21/2014    Performed by Isaac Tanner MD at [facility] ENDO    ENDARTERECTOMY-CAROTID Left 8/18/2017    Performed by  [facility]    FOOT AMPUTATION THROUGH METATARSAL  1996    left    FOOT SURGERY Bilateral 1980's    per patient multiple toe amputations prior to.  partial foot amputation:first great toe then other toes     KIDNEY SURGERY  2014    per patient separation of horseshoe kidney @ time of AAA repair    LUNG LOBECTOMY Right 1970s    per patient not cancer    PYELOGRAM-RETROGRADE Left 5/4/2017    Per[facility] by Scooter [facility] at [facility] OR    RESECTION-BOWEL N/A 3/16/2014    Performed by Stefan [facility] at [facility] OR    right below knee amputation  2009 (approx)    SMALL INTESTINE SURGERY  2014    per patient partial @ time of aaa repair  not small bowel - large bowel bowel compromised bythtwe AAAbowel    TONSILLECTOMY  1955 aprox    URETERAL STENT PLACEMENT Left     annually replaced since 2012 or so  Dr Jin         Family History:  Family History   Problem Relation Age of Onset    Cancer Mother         lung    COPD Mother     Heart disease Father         MI but per patient bc of old age    Diabetes Daughter     Eczema Neg Hx     Lupus Neg Hx     Psoriasis Neg Hx     Melanoma Neg Hx     Kidney disease Neg Hx     Stroke Neg Hx     Mental illness Neg Hx     Mental retardation Neg Hx     Hypertension Neg Hx     Hyperlipidemia Neg Hx     Drug abuse Neg Hx     Alcohol abuse Neg Hx     Depression Neg Hx        Social History:  Social History     Tobacco Use    Smoking status: Former Smoker     Packs/day: 1.00     Years: 15.00     Pack years: 15.00     Last attempt to quit: 1/1/2009     Years since quitting: 10.1    Smokeless tobacco: Never Used   Substance and Sexual Activity    Alcohol use: No    Drug use: No     Comment: Is on prescription opiod, no non prescribed use    Sexual activity: Not Currently     Partners: Female        Review of Systems     Review of Systems   Constitutional: Negative for chills, diaphoresis and fever.        (+) High BP   HENT:  Negative for congestion and sore throat.    Eyes: Negative for pain.   Respiratory: Positive for shortness of breath. Negative for cough and choking.    Cardiovascular: Positive for chest pain (resolved). Negative for palpitations and leg swelling.   Gastrointestinal: Negative for diarrhea, nausea and vomiting.   Genitourinary: Negative for dysuria and hematuria.   Musculoskeletal: Negative for back pain and neck pain.        (-) leg pain     Skin: Negative for rash.   Neurological: Negative for dizziness and weakness.   Psychiatric/Behavioral: Negative for confusion.   All other systems reviewed and are negative.       Physical Exam     Initial Vitals [02/25/19 2316]   BP Pulse Resp Temp SpO2   (!) 185/90 85 18 98.3 °F (36.8 °C) 97 %      MAP       --          Physical Exam  Nursing Notes and Vital Signs Reviewed.  Constitutional: Patient is in no apparent distress. Well-developed and well-nourished.  Head: Atraumatic. Normocephalic.  Eyes: PERRL. EOM intact. Conjunctivae are not pale. No scleral icterus.  ENT: Mucous membranes are moist. Oropharynx is clear and symmetric.    Neck: Supple. Full ROM. No lymphadenopathy.  Cardiovascular: Regular rate. Regular rhythm. No murmurs, rubs, or gallops. Distal pulses are 2+ and symmetric.  Pulmonary/Chest: No respiratory distress. Clear to auscultation bilaterally. No wheezing or rales.  Abdominal: Soft and non-distended.  There is no tenderness.  No rebound, guarding, or rigidity. Good bowel sounds.  Genitourinary: No CVA tenderness  Musculoskeletal: Moves all extremities. No edema. No calf tenderness. Toe amputations on L foot. R BKA.   Skin: Warm and dry. Midline abd scar.   Neurological:  Alert, awake, and appropriate.  Normal speech.  No acute focal neurological deficits are appreciated.  Psychiatric: Normal affect. Good eye contact. Appropriate in content. Anxious.        ED Course   Procedures  ED Vital Signs:  Vitals:    02/25/19 2316 02/25/19 2356 02/26/19 0003  "02/26/19 0102   BP: (!) 185/90  (!) 167/105 (!) 168/76   Pulse: 85  82 77   Resp: 18   19   Temp: 98.3 °F (36.8 °C)      TempSrc: Oral      SpO2: 97%  98% 96%   Weight:  88.6 kg (195 lb 5.2 oz)     Height: 6' 1" (1.854 m)       02/26/19 0202   BP: (!) 166/78   Pulse: 80   Resp: (!) 23   Temp:    TempSrc:    SpO2: 96%   Weight:    Height:        Abnormal Lab Results:  Labs Reviewed   CBC W/ AUTO DIFFERENTIAL - Abnormal; Notable for the following components:       Result Value    RBC 3.35 (*)     Hemoglobin 9.8 (*)     Hematocrit 29.9 (*)     RDW 15.6 (*)     Lymph% 17.1 (*)     All other components within normal limits   COMPREHENSIVE METABOLIC PANEL - Abnormal; Notable for the following components:    Chloride 113 (*)     CO2 15 (*)     BUN, Bld 29 (*)     Creatinine 2.4 (*)     Calcium 8.2 (*)     Alkaline Phosphatase 160 (*)     eGFR if  30 (*)     eGFR if non  26 (*)     All other components within normal limits   B-TYPE NATRIURETIC PEPTIDE - Abnormal; Notable for the following components:     (*)     All other components within normal limits   PROTIME-INR   APTT   CK-MB   CK   TROPONIN I        All Lab Results:  Results for orders placed or performed during the hospital encounter of 02/25/19   CBC auto differential   Result Value Ref Range    WBC 7.82 3.90 - 12.70 K/uL    RBC 3.35 (L) 4.60 - 6.20 M/uL    Hemoglobin 9.8 (L) 14.0 - 18.0 g/dL    Hematocrit 29.9 (L) 40.0 - 54.0 %    MCV 89 82 - 98 fL    MCH 29.3 27.0 - 31.0 pg    MCHC 32.8 32.0 - 36.0 g/dL    RDW 15.6 (H) 11.5 - 14.5 %    Platelets 166 150 - 350 K/uL    MPV 9.3 9.2 - 12.9 fL    Gran # (ANC) 5.4 1.8 - 7.7 K/uL    Lymph # 1.3 1.0 - 4.8 K/uL    Mono # 0.7 0.3 - 1.0 K/uL    Eos # 0.3 0.0 - 0.5 K/uL    Baso # 0.03 0.00 - 0.20 K/uL    Gran% 69.2 38.0 - 73.0 %    Lymph% 17.1 (L) 18.0 - 48.0 %    Mono% 9.3 4.0 - 15.0 %    Eosinophil% 4.0 0.0 - 8.0 %    Basophil% 0.4 0.0 - 1.9 %    Differential Method Automated  "   Comprehensive metabolic panel   Result Value Ref Range    Sodium 137 136 - 145 mmol/L    Potassium 4.0 3.5 - 5.1 mmol/L    Chloride 113 (H) 95 - 110 mmol/L    CO2 15 (L) 23 - 29 mmol/L    Glucose 102 70 - 110 mg/dL    BUN, Bld 29 (H) 8 - 23 mg/dL    Creatinine 2.4 (H) 0.5 - 1.4 mg/dL    Calcium 8.2 (L) 8.7 - 10.5 mg/dL    Total Protein 7.5 6.0 - 8.4 g/dL    Albumin 3.5 3.5 - 5.2 g/dL    Total Bilirubin 0.4 0.1 - 1.0 mg/dL    Alkaline Phosphatase 160 (H) 55 - 135 U/L    AST 14 10 - 40 U/L    ALT 10 10 - 44 U/L    Anion Gap 9 8 - 16 mmol/L    eGFR if African American 30 (A) >60 mL/min/1.73 m^2    eGFR if non African American 26 (A) >60 mL/min/1.73 m^2   Protime-INR   Result Value Ref Range    Prothrombin Time 10.8 9.0 - 12.5 sec    INR 1.0 0.8 - 1.2   APTT   Result Value Ref Range    aPTT 30.3 21.0 - 32.0 sec   CK-MB   Result Value Ref Range     20 - 200 U/L    CPK MB 3.1 0.1 - 6.5 ng/mL    MB% 2.2 0.0 - 5.0 %   CK   Result Value Ref Range     20 - 200 U/L   Troponin I   Result Value Ref Range    Troponin I 0.010 0.000 - 0.026 ng/mL   Brain natriuretic peptide   Result Value Ref Range     (H) 0 - 99 pg/mL         Imaging Results:  Imaging Results          X-Ray Chest 1 View (Final result)  Result time 02/26/19 07:30:34    Final result by Aurelio Curtis MD (02/26/19 07:30:34)                 Impression:      No acute process seen.      Electronically signed by: Aurelio Curtis MD  Date:    02/26/2019  Time:    07:30             Narrative:    EXAMINATION:  XR CHEST 1 VIEW    CLINICAL HISTORY:  cp;    FINDINGS:  Single view of the chest.    Cardiac silhouette is normal.  Chronic interstitial changes are suspected throughout the lungs bilaterally.  Blunting of the right costophrenic angle is again noted likely reflecting scarring.  Aorta demonstrates atherosclerotic disease..  No evidence of pleural effusion or pneumothorax.  Bones demonstrate moderate degenerative changes.                              2:07 AM: Per ED physician, pt's CXR results: NAF.      The EKG was ordered, reviewed, and independently interpreted by the ED provider.  Interpretation time: 12:03 AM  Rate: 79 BPM  Rhythm: normal sinus rhythm  Interpretation: No acute ST changes. No STEMI.             The Emergency Provider reviewed the vital signs and test results, which are outlined above.     ED Discussion     2:04 AM: Reassessed pt at this time. Discussed with pt all pertinent ED information and results. Discussed pt dx and plan of tx. Gave pt all f/u and return to the ED instructions. All questions and concerns were addressed at this time. Pt expresses understanding of information and instructions, and is comfortable with plan to discharge. Pt is stable for discharge.      I have discussed with patient and/or family/caretaker chest pain precautions, specifically to return for worsening chest pain, shortness of breath, fever, or any concern.  I have low suspicion for cardiopulmonary, vascular, infectious, respiratory, or other emergent medical condition based on my evaluation in the ED.    ED Medication(s):  Medications - No data to display    Discharge Medication List as of 2/26/2019  2:06 AM          Follow-up Information     Norma Nuñez MD In 2 days.    Specialty:  Family Medicine  Contact information:  [address] [address] [address]  [phone]             [facility] In 2 days.    Specialty:  Cardiology  Contact information:  [address] [facility] [address] [phone]  Additional information:  (off [facility]) 2nd floor           [facility]    Specialty:  Emergency Medicine  Why:  If symptoms worsen  Contact information:  [address] [facility] [address] [phone]                       Medical Decision Making:   Clinical Tests:   Lab Tests: Ordered and Reviewed  Radiological Study: Ordered and Reviewed  Medical Tests: Ordered and  Reviewed             Scribe Attestation:   Scribe #1: I performed the above scribed service and the documentation accurately describes the services I performed. I attest to the accuracy of the note.     Attending:   Physician Attestation Statement for Scribe #1: I, Lázaro Jaramillo MD, personally performed the services described in this documentation, as scribed by Nelida Chavez, in my presence, and it is both accurate and complete.           Clinical Impression       ICD-10-CM ICD-9-CM   1. Chest pain, unspecified type R07.9 786.50   2. Chest pain R07.9 786.50   3. CKD (chronic kidney disease) stage 3, GFR 30-59 ml/min N18.3 585.3   4. CAD;Old MI ; s/p stents x 3 (2000) I25.2 412       Disposition:   Disposition: Discharged  Condition: Stable       Lázaro Jaramillo MD  03/01/19 [id number]

## 2019-02-27 NOTE — TELEPHONE ENCOUNTER
----- Message from Brenton Jacobson MD sent at 2/26/2019 10:57 AM CST -----  Good morning Adriel    Patient was in the emergency room last night with recurrent chest pain which was relieved with sublingual nitroglycerin.  I have finished my evaluation for his chronic kidney disease.  I agree with you that we should go ahead and proceed with left heart catheterization with calculated risks in the face of worsening renal function of chronic kidney disease stage 4.  I have had a detailed discussion with him in the office today.  He is ready to proceed.  Please see my office note from today.    Do not hesitate to call me or send me a message for any clarification    Thanks    Avel

## 2019-02-27 NOTE — TELEPHONE ENCOUNTER
Kimberley  Please schedule LHC next Thurs, March 7th at 10 am.  Pt needs to get there at 6:30 for IV fluids for renal protection.    Thanks    Dr Boone

## 2019-02-27 NOTE — TELEPHONE ENCOUNTER
Telephoned patient to schedule/confirm LHC with Dr. Boone on 3/7/19.  Spoke with Mrs. Ross reviewed Npo status and arrival time, medications to avoid and continue Aspirin and Plavix daily.

## 2019-03-05 ENCOUNTER — OFFICE VISIT (OUTPATIENT)
Dept: HEMATOLOGY/ONCOLOGY | Facility: CLINIC | Age: 69
End: 2019-03-05
Payer: MEDICARE

## 2019-03-05 ENCOUNTER — LAB VISIT (OUTPATIENT)
Dept: LAB | Facility: HOSPITAL | Age: 69
End: 2019-03-05
Payer: MEDICARE

## 2019-03-05 VITALS
DIASTOLIC BLOOD PRESSURE: 70 MMHG | HEART RATE: 74 BPM | OXYGEN SATURATION: 98 % | HEIGHT: 73 IN | BODY MASS INDEX: 26.42 KG/M2 | TEMPERATURE: 97 F | WEIGHT: 199.31 LBS | SYSTOLIC BLOOD PRESSURE: 141 MMHG

## 2019-03-05 DIAGNOSIS — Z89.511 STATUS POST BELOW KNEE AMPUTATION OF RIGHT LOWER EXTREMITY: Chronic | ICD-10-CM

## 2019-03-05 DIAGNOSIS — D50.0 IRON DEFICIENCY ANEMIA DUE TO CHRONIC BLOOD LOSS: Primary | ICD-10-CM

## 2019-03-05 DIAGNOSIS — R76.8 ELEVATED SERUM IMMUNOGLOBULIN FREE LIGHT CHAINS: ICD-10-CM

## 2019-03-05 DIAGNOSIS — D50.0 ANEMIA DUE TO CHRONIC BLOOD LOSS: ICD-10-CM

## 2019-03-05 DIAGNOSIS — Z89.432 HISTORY OF TRANSMETATARSAL AMPUTATION OF LEFT FOOT: ICD-10-CM

## 2019-03-05 DIAGNOSIS — D50.0 IRON DEFICIENCY ANEMIA DUE TO CHRONIC BLOOD LOSS: ICD-10-CM

## 2019-03-05 DIAGNOSIS — N18.4 CKD STAGE G4/A3, GFR 15-29 AND ALBUMIN CREATININE RATIO >300 MG/G: ICD-10-CM

## 2019-03-05 LAB
ALBUMIN SERPL BCP-MCNC: 3.4 G/DL
ALP SERPL-CCNC: 162 U/L
ALT SERPL W/O P-5'-P-CCNC: 8 U/L
ANION GAP SERPL CALC-SCNC: 6 MMOL/L
AST SERPL-CCNC: 11 U/L
BASOPHILS # BLD AUTO: 0.05 K/UL
BASOPHILS NFR BLD: 0.7 %
BILIRUB SERPL-MCNC: 0.5 MG/DL
BUN SERPL-MCNC: 33 MG/DL
CALCIUM SERPL-MCNC: 8.7 MG/DL
CHLORIDE SERPL-SCNC: 114 MMOL/L
CO2 SERPL-SCNC: 19 MMOL/L
CREAT SERPL-MCNC: 2.4 MG/DL
DIFFERENTIAL METHOD: ABNORMAL
EOSINOPHIL # BLD AUTO: 0.3 K/UL
EOSINOPHIL NFR BLD: 4.7 %
ERYTHROCYTE [DISTWIDTH] IN BLOOD BY AUTOMATED COUNT: 15.1 %
EST. GFR  (AFRICAN AMERICAN): 30 ML/MIN/1.73 M^2
EST. GFR  (NON AFRICAN AMERICAN): 26 ML/MIN/1.73 M^2
FERRITIN SERPL-MCNC: 826 NG/ML
GLUCOSE SERPL-MCNC: 94 MG/DL
HCT VFR BLD AUTO: 30.4 %
HGB BLD-MCNC: 9.6 G/DL
IMM GRANULOCYTES # BLD AUTO: 0.02 K/UL
IMM GRANULOCYTES NFR BLD AUTO: 0.3 %
IRON SERPL-MCNC: 40 UG/DL
LYMPHOCYTES # BLD AUTO: 1.1 K/UL
LYMPHOCYTES NFR BLD: 15.9 %
MCH RBC QN AUTO: 29.2 PG
MCHC RBC AUTO-ENTMCNC: 31.6 G/DL
MCV RBC AUTO: 92 FL
MONOCYTES # BLD AUTO: 0.6 K/UL
MONOCYTES NFR BLD: 9.3 %
NEUTROPHILS # BLD AUTO: 4.8 K/UL
NEUTROPHILS NFR BLD: 69.4 %
NRBC BLD-RTO: 0 /100 WBC
PLATELET # BLD AUTO: 177 K/UL
PMV BLD AUTO: 9.4 FL
POTASSIUM SERPL-SCNC: 4.9 MMOL/L
PROT SERPL-MCNC: 7.6 G/DL
RBC # BLD AUTO: 3.29 M/UL
SATURATED IRON: 17 %
SODIUM SERPL-SCNC: 139 MMOL/L
TOTAL IRON BINDING CAPACITY: 237 UG/DL
TRANSFERRIN SERPL-MCNC: 160 MG/DL
WBC # BLD AUTO: 6.86 K/UL

## 2019-03-05 PROCEDURE — 1101F PT FALLS ASSESS-DOCD LE1/YR: CPT | Mod: HCNC,CPTII,S$GLB, | Performed by: NURSE PRACTITIONER

## 2019-03-05 PROCEDURE — 3078F PR MOST RECENT DIASTOLIC BLOOD PRESSURE < 80 MM HG: ICD-10-PCS | Mod: HCNC,CPTII,S$GLB, | Performed by: NURSE PRACTITIONER

## 2019-03-05 PROCEDURE — 83540 ASSAY OF IRON: CPT | Mod: HCNC

## 2019-03-05 PROCEDURE — 1101F PR PT FALLS ASSESS DOC 0-1 FALLS W/OUT INJ PAST YR: ICD-10-PCS | Mod: HCNC,CPTII,S$GLB, | Performed by: NURSE PRACTITIONER

## 2019-03-05 PROCEDURE — 36415 COLL VENOUS BLD VENIPUNCTURE: CPT | Mod: HCNC

## 2019-03-05 PROCEDURE — 99999 PR PBB SHADOW E&M-EST. PATIENT-LVL III: CPT | Mod: PBBFAC,HCNC,, | Performed by: NURSE PRACTITIONER

## 2019-03-05 PROCEDURE — 3078F DIAST BP <80 MM HG: CPT | Mod: HCNC,CPTII,S$GLB, | Performed by: NURSE PRACTITIONER

## 2019-03-05 PROCEDURE — 3077F PR MOST RECENT SYSTOLIC BLOOD PRESSURE >= 140 MM HG: ICD-10-PCS | Mod: HCNC,CPTII,S$GLB, | Performed by: NURSE PRACTITIONER

## 2019-03-05 PROCEDURE — 99999 PR PBB SHADOW E&M-EST. PATIENT-LVL III: ICD-10-PCS | Mod: PBBFAC,HCNC,, | Performed by: NURSE PRACTITIONER

## 2019-03-05 PROCEDURE — 80053 COMPREHEN METABOLIC PANEL: CPT | Mod: HCNC

## 2019-03-05 PROCEDURE — 85025 COMPLETE CBC W/AUTO DIFF WBC: CPT | Mod: HCNC

## 2019-03-05 PROCEDURE — 99214 OFFICE O/P EST MOD 30 MIN: CPT | Mod: HCNC,S$GLB,, | Performed by: NURSE PRACTITIONER

## 2019-03-05 PROCEDURE — 99214 PR OFFICE/OUTPT VISIT, EST, LEVL IV, 30-39 MIN: ICD-10-PCS | Mod: HCNC,S$GLB,, | Performed by: NURSE PRACTITIONER

## 2019-03-05 PROCEDURE — 82728 ASSAY OF FERRITIN: CPT | Mod: HCNC

## 2019-03-05 PROCEDURE — 3077F SYST BP >= 140 MM HG: CPT | Mod: HCNC,CPTII,S$GLB, | Performed by: NURSE PRACTITIONER

## 2019-03-05 NOTE — PATIENT INSTRUCTIONS
Anemia, Type Not Specified (Adult)  Red blood cells carry oxygen to the tissues of your body. Anemia is a condition in which you have too few red blood cells. You need iron to make red blood cells. The most common cause of anemia is not having enough iron. This may be because of:  · Loss of blood. This can be caused by heavy menstrual periods. It can also be caused by bleeding from the stomach or intestines.  · Poor diet. You may not be eating enough foods that contain iron.  Other causes of anemia include certain vitamin deficiencies, chronic kidney disease, and certain other chronic illnesses.  Anemia makes you to feel tired and run down. When anemia becomes severe, your skin becomes pale. You may feel short of breath after physical activity. Other symptoms include:  · Headaches  · Dizziness  · Leg cramps with physical activity  · Drowsiness  Home care  Follow these guidelines when caring for yourself at home:  · Dont overexert yourself.  · Talk with your healthcare provider before traveling by air or traveling to high altitudes.  Follow-up care  Follow up with your healthcare provider, or as advised. You may need other blood tests to find out the exact cause of your anemia. If you had testing done today, it may take several days to get all of the results. You can follow up with your own healthcare provider to get the results.  When to seek medical advice  Call your healthcare provider right away if any of these occur:  · Shortness of breath or chest pain  · Dizziness or fainting gets worse  · Vomiting blood or passing red or black-colored stool  Date Last Reviewed: 2/25/2016  © 1269-2548 [employer]. [address] [place] [address], [place] [address]. All rights reserved. This information is not intended as a substitute for professional medical care. Always follow your healthcare professional's instructions.

## 2019-03-05 NOTE — PROGRESS NOTES
Subjective:       Patient ID: Kodi Ribeiro is a 69 y.o. male.    Chief Complaint: Anemia and Results    69 year old male, presents for evaluation of anemia. I personally reviewed available records and used this information in my clinical decision making today. Patient has had chronic anemia since 2014. He has had multiple amputation procedures due to PVD and bypass grafts placed by Dr. Jamil and Dr. Hyatt. He reports intermittent chest pain which occurs typically in the afternoon after lying down to rest. He was evaluated by Cardiology and was scheduled for Heart Cath but this was cancelled due to Hgb: 9.4. He was then referred to Hem/Onc for evaluation. Iron deficiency treated with IV Injectafer, second dose completed on 2/19/19.         He had a bone marrow biopsy in 2012:    Bone marrow karyotype results: 46, XY(20), male karyotype.  In situ hybridization for kappa and lambda: No definitive evidence of light chain restriction.      Anemia   There has been no abdominal pain, bruising/bleeding easily, confusion, fever, light-headedness or palpitations.     Review of Systems   Constitutional: Positive for activity change and fatigue. Negative for appetite change, chills, diaphoresis, fever and unexpected weight change.   HENT: Negative for congestion, hearing loss, mouth sores, nosebleeds and trouble swallowing.    Eyes: Negative for discharge and visual disturbance.   Respiratory: Negative for cough, chest tightness and shortness of breath.    Cardiovascular: Negative for chest pain (intermittent, no chest pain at time of visit), palpitations and leg swelling.   Gastrointestinal: Positive for abdominal distention. Negative for abdominal pain, constipation, diarrhea, nausea and vomiting.   Endocrine: Negative for cold intolerance and heat intolerance.   Genitourinary: Negative for difficulty urinating, dysuria and hematuria.   Musculoskeletal: Positive for arthralgias, back pain, gait problem and myalgias.    Skin: Negative.    Neurological: Negative for dizziness, weakness, light-headedness and headaches.   Hematological: Negative for adenopathy. Does not bruise/bleed easily.   Psychiatric/Behavioral: Negative for agitation, behavioral problems and confusion. The patient is nervous/anxious.        Medication List with Changes/Refills   Current Medications    AMLODIPINE (NORVASC) 10 MG TABLET    TAKE 1 TABLET EVERY DAY    ASPIRIN (ECOTRIN) 81 MG EC TABLET    Take 1 tablet (81 mg total) by mouth once daily.    CARVEDILOL (COREG) 12.5 MG TABLET    Take 1 tablet (12.5 mg total) by mouth 2 (two) times daily with meals.    CLOPIDOGREL (PLAVIX) 75 MG TABLET    TAKE 1 TABLET EVERY DAY    DOCUSATE SODIUM (COLACE) 100 MG CAPSULE    Take 1 capsule (100 mg total) by mouth 2 (two) times daily.    ERGOCALCIFEROL (ERGOCALCIFEROL) 50,000 UNIT CAP    Take 1 capsule (50,000 Units total) by mouth every 7 days.    ISOSORBIDE MONONITRATE (IMDUR) 60 MG 24 HR TABLET    Take 1 tablet (60 mg total) by mouth once daily.    LOSARTAN (COZAAR) 100 MG TABLET    Take 1 tablet (100 mg total) by mouth once daily.    NITROGLYCERIN (NITROSTAT) 0.6 MG SUBL    Place 1 tablet (0.6 mg total) under the tongue every 5 (five) minutes as needed (max 3/ per episode).    OXYCODONE-ACETAMINOPHEN (PERCOCET) 5-325 MG PER TABLET    Take 1-2 tablets by mouth every 4 (four) hours as needed for Pain.    PANTOPRAZOLE (PROTONIX) 40 MG TABLET    Take 1 tablet (40 mg total) by mouth once daily.    PRAVASTATIN (PRAVACHOL) 80 MG TABLET    TAKE 1 TABLET EVERY EVENING    TRIAMCINOLONE ACETONIDE 0.025% (KENALOG) 0.025 % CREAM    Apply topically 2 (two) times daily.     Objective:     Vitals:    03/05/19 1011   BP: (!) 141/70   Pulse: 74   Temp: 97.2 °F (36.2 °C)     Physical Exam   Constitutional: He is oriented to person, place, and time. He appears well-developed and well-nourished. No distress.   HENT:   Head: Normocephalic and atraumatic.   Right Ear: Hearing and external  ear normal.   Left Ear: Hearing and external ear normal.   Nose: Nose normal. No mucosal edema or rhinorrhea. No epistaxis.   Mouth/Throat: Uvula is midline, oropharynx is clear and moist and mucous membranes are normal.   Eyes: Conjunctivae and EOM are normal. Pupils are equal, round, and reactive to light. Right eye exhibits no chemosis and no discharge. Left eye exhibits no chemosis and no discharge.   Neck: Trachea normal and normal range of motion. Neck supple. No thyroid mass and no thyromegaly present.   Cardiovascular: Normal rate, regular rhythm, normal heart sounds and intact distal pulses.   No murmur heard.  Pulses:       Dorsalis pedis pulses are 2+ on the right side, and 2+ on the left side.   Pulmonary/Chest: Effort normal and breath sounds normal. No respiratory distress. He has no decreased breath sounds. He has no wheezes.   Abdominal: Soft. Bowel sounds are normal. He exhibits distension. There is no tenderness.   Musculoskeletal: Normal range of motion. He exhibits no edema or tenderness.   Right BKA     Lymphadenopathy:     He has no cervical adenopathy.     He has no axillary adenopathy.        Right: No supraclavicular adenopathy present.        Left: No supraclavicular adenopathy present.   Neurological: He is alert and oriented to person, place, and time. He has normal strength. No cranial nerve deficit. Coordination normal.   Skin: Skin is warm, dry and intact. Capillary refill takes less than 2 seconds. No rash noted. He is not diaphoretic. No erythema. There is pallor.   Psychiatric: His speech is normal and behavior is normal. Judgment and thought content normal. His mood appears anxious. Cognition and memory are normal.   Vitals reviewed.      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 He is to have a heart cath completed on Thursday. He would like to wait until after this procedure to discuss.   3) Patient to return to clinic in 6 weeks and possible procrit injections for treatment of anemia.     I will review assessment/plan with collaborating physician Dr. Merlos.

## 2019-03-06 PROBLEM — I20.0 UNSTABLE ANGINA: Status: ACTIVE | Noted: 2019-03-06

## 2019-03-07 ENCOUNTER — HOSPITAL ENCOUNTER (OUTPATIENT)
Facility: HOSPITAL | Age: 69
Discharge: HOME | End: 2019-03-07
Attending: INTERNAL MEDICINE | Admitting: INTERNAL MEDICINE
Payer: MEDICARE

## 2019-03-07 VITALS
HEIGHT: 73 IN | DIASTOLIC BLOOD PRESSURE: 61 MMHG | TEMPERATURE: 98 F | RESPIRATION RATE: 16 BRPM | BODY MASS INDEX: 26.37 KG/M2 | HEART RATE: 73 BPM | SYSTOLIC BLOOD PRESSURE: 150 MMHG | OXYGEN SATURATION: 95 % | WEIGHT: 199 LBS

## 2019-03-07 DIAGNOSIS — I20.0 UNSTABLE ANGINA: ICD-10-CM

## 2019-03-07 DIAGNOSIS — I25.110 ATHEROSCLEROTIC HEART DISEASE OF NATIVE CORONARY ARTERY WITH UNSTABLE ANGINA PECTORIS: ICD-10-CM

## 2019-03-07 LAB — CORONARY STENOSIS: ABNORMAL

## 2019-03-07 PROCEDURE — 25500020 PHARM REV CODE 255: Mod: HCNC

## 2019-03-07 PROCEDURE — 99152 CATH LAB PROCEDURE: ICD-10-PCS | Mod: HCNC,,, | Performed by: INTERNAL MEDICINE

## 2019-03-07 PROCEDURE — 63600175 PHARM REV CODE 636 W HCPCS: Mod: HCNC

## 2019-03-07 PROCEDURE — 25000003 PHARM REV CODE 250: Mod: HCNC

## 2019-03-07 PROCEDURE — 99152 MOD SED SAME PHYS/QHP 5/>YRS: CPT | Mod: HCNC

## 2019-03-07 PROCEDURE — 99152 MOD SED SAME PHYS/QHP 5/>YRS: CPT | Mod: HCNC,,, | Performed by: INTERNAL MEDICINE

## 2019-03-07 PROCEDURE — 93459 L HRT ART/GRFT ANGIO: CPT | Mod: 26,HCNC,, | Performed by: INTERNAL MEDICINE

## 2019-03-07 PROCEDURE — 93459 CATH LAB PROCEDURE: ICD-10-PCS | Mod: 26,HCNC,, | Performed by: INTERNAL MEDICINE

## 2019-03-07 PROCEDURE — 93459 L HRT ART/GRFT ANGIO: CPT | Mod: HCNC

## 2019-03-07 RX ORDER — ASPIRIN 325 MG
325 TABLET ORAL ONCE
Status: COMPLETED | OUTPATIENT
Start: 2019-03-07 | End: 2019-03-07

## 2019-03-07 RX ORDER — DIAZEPAM 5 MG/1
5 TABLET ORAL ONCE
Status: COMPLETED | OUTPATIENT
Start: 2019-03-07 | End: 2019-03-07

## 2019-03-07 RX ORDER — SODIUM CHLORIDE 9 MG/ML
INJECTION, SOLUTION INTRAVENOUS CONTINUOUS
Status: DISCONTINUED | OUTPATIENT
Start: 2019-03-07 | End: 2019-03-07 | Stop reason: HOSPADM

## 2019-03-07 RX ORDER — DIPHENHYDRAMINE HCL 50 MG
50 CAPSULE ORAL ONCE
Status: COMPLETED | OUTPATIENT
Start: 2019-03-07 | End: 2019-03-07

## 2019-03-07 RX ADMIN — Medication 50 MG: at 08:03

## 2019-03-07 RX ADMIN — Medication 325 MG: at 08:03

## 2019-03-07 RX ADMIN — SODIUM CHLORIDE: 9 INJECTION, SOLUTION INTRAVENOUS at 08:03

## 2019-03-07 RX ADMIN — DIAZEPAM 5 MG: 5 TABLET ORAL at 08:03

## 2019-03-07 NOTE — SUBJECTIVE & OBJECTIVE
No current facility-administered medications on file prior to encounter.      Current Outpatient Medications on File Prior to Encounter   Medication Sig    amLODIPine (NORVASC) 10 MG tablet TAKE 1 TABLET EVERY DAY    aspirin (ECOTRIN) 81 MG EC tablet Take 1 tablet (81 mg total) by mouth once daily.    carvedilol (COREG) 12.5 MG tablet Take 1 tablet (12.5 mg total) by mouth 2 (two) times daily with meals.    clopidogrel (PLAVIX) 75 mg tablet TAKE 1 TABLET EVERY DAY    docusate sodium (COLACE) 100 MG capsule Take 1 capsule (100 mg total) by mouth 2 (two) times daily.    isosorbide mononitrate (IMDUR) 60 MG 24 hr tablet Take 1 tablet (60 mg total) by mouth once daily.    losartan (COZAAR) 100 MG tablet Take 1 tablet (100 mg total) by mouth once daily.    nitroglycerin (NITROSTAT) 0.6 MG Subl Place 1 tablet (0.6 mg total) under the tongue every 5 (five) minutes as needed (max 3/ per episode).    pantoprazole (PROTONIX) 40 MG tablet Take 1 tablet (40 mg total) by mouth once daily.    pravastatin (PRAVACHOL) 80 MG tablet TAKE 1 TABLET EVERY EVENING    ergocalciferol (ERGOCALCIFEROL) 50,000 unit Cap Take 1 capsule (50,000 Units total) by mouth every 7 days.    oxyCODONE-acetaminophen (PERCOCET) 5-325 mg per tablet Take 1-2 tablets by mouth every 4 (four) hours as needed for Pain.    [DISCONTINUED] triamcinolone acetonide 0.025% (KENALOG) 0.025 % cream Apply topically 2 (two) times daily.       Review of patient's allergies indicates:   Allergen Reactions    Morphine Itching       Past Medical History:   Diagnosis Date    Anemia     AP (angina pectoris) 1/11/2019    Arthritis     Colon polyp     Repeat colonoscopy due in 9/14    Coronary artery disease     Diverticulosis     colonoscopy 2/21/2014    Encounter for blood transfusion     GERD (gastroesophageal reflux disease)     Hemorrhoids     colonoscopy 2/21/2014    Horseshoe kidney     Hyperglycemia 3/17/2014    Hyperlipidemia     Hypertension      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    Late complications of amputation stump     rseolved with further amputation( MRSA then none since 2014)    Lipoma of colon     colonoscopy 2/21/2014    Myocardial infarction     per patient 2000 & 9/2012    Peripheral vascular disease     Phantom limb syndrome     patient reports only intermittent not problematic, not worsening    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    Spinal cord disease     L4L5 disc    Stroke     Tobacco dependence     resolved    Ureteral stent retained      Past Surgical History:   Procedure Laterality Date    ABDOMINAL AORTIC ANEURYSM REPAIR      ABDOMINAL AORTIC ANEURYSM REPAIR  1996/2014    AMPUTATION, LOWER LIMB      AORTA - BILATERAL FEMORAL ARTERY BYPASS GRAFT  2014    Left and right leg    COLONOSCOPY N/A 2/21/2014    [facility]formed by [facility] at [facility] ENDO    CORONARY ANGIOPLASTY WITH STENT PLACEMENT  2000    Three placed in heart    CREATION, BYPASS, ARTERIAL, AORTA TO FEMORAL, BILATERAL Right 3/16/2014    Performed by Stefan Jamil [facility] at [facility] OR    CYSTOSCOPY WITH STENT EXCHANGE/RETROGRADE PYELOGRAM Left 4/2/2015    Performed by Scooter Jin IV, MD at [facility] OR    CYSTOSCOPY WITH STENT PLACEMENT Left 5/4/2017    Performed by Scooter Jin IV, MD at [facility] OR    CYSTOSCOPY WITH STENT PLACEMENT Left 4/28/2016    Performed by Scooter Jin IV, MD at [facility] OR    CYSTOSCOPY, WITH RETROGRADE PYELOGRAM Left 5/29/2018    Performed by Scooter Jin IV, [facility] at [facility] OR    CYSTOSCOPY, WITH URETERAL STENT INSERTION Left 5/29/2018    Performed by Scooter Jin IV, MD at [facility] OR    CYSTOSCOPY, WITH URETERAL STENT INSERTION Left 1/23/2014    Performed by Scooter Jin IV, MD at [facility] OR    CYSTOSCOPY, WITH URETERAL STENT REMOVAL Left 5/29/2018    Performed by Scooter Jin IV, MD at [facility] OR    EGD (ESOPHAGOGASTRODUODENOSCOPY) N/A 2/21/2014    Performed by Isaac Tanner MD at [facility] ENDO     ENDARTERECTOMY-CAROTID Left 8/18/2017    Performed by [facility] at [facility] OR    FOOT AMPUTATION THROUGH METATARSAL  1996    left    FOOT SURGERY Bilateral 1980's    per patient multiple toe amputations prior to.  partial foot amputation:first great toe then other toes     KIDNEY SURGERY  2014    per patient separation of horseshoe kidney @ time of AAA repair    LUNG LOBECTOMY Right 1970s    per patient not cancer    PYELOGRAM-RETROGRADE Left 5/4/2017    Performed by Scooter Jin IV, [facility] at [facility] OR    RESECTION-BOWEL N/A 3/16/2014    Performed by Stefan [facility] at [facility] OR    right below knee amputation  2009 (approx)    SMALL INTESTINE SURGERY  2014    per patient partial @ time of aaa repair  not small bowel - large bowel bowel compromised bythtwe AAAbowel    TONSILLECTOMY  1955 aprox    URETERAL STENT PLACEMENT Left     annually replaced since 2012 or so  Dr Jin     Family History     Problem Relation (Age of Onset)    COPD Mother    Cancer Mother    Diabetes Daughter    Heart disease Father        Tobacco Use    Smoking status: Former Smoker     Packs/day: 1.00     Years: 15.00     Pack years: 15.00     Last attempt to quit: 1/1/2009     Years since quitting: 10.1    Smokeless tobacco: Never Used   Substance and Sexual Activity    Alcohol use: No    Drug use: No     Comment: Is on prescription opiod, no non prescribed use    Sexual activity: Not Currently     Partners: Female     Review of Systems   Constitutional: Positive for activity change. Negative for appetite change, chills and diaphoresis.   HENT: Positive for congestion, hearing loss and sinus pain. Negative for facial swelling.    Eyes: Negative for discharge and visual disturbance.   Respiratory: Positive for chest tightness and shortness of breath. Negative for cough and choking.    Cardiovascular: Positive for chest pain. Negative for palpitations and leg swelling.   Gastrointestinal: Negative for abdominal  distention, abdominal pain and blood in stool.   Endocrine: Negative for polyuria.   Genitourinary: Negative for difficulty urinating, dysuria and hematuria.   Musculoskeletal: Positive for back pain.   Allergic/Immunologic: Positive for environmental allergies. Negative for food allergies.   Neurological: Negative for dizziness, light-headedness and headaches.   Hematological: Bruises/bleeds easily.   Psychiatric/Behavioral: Negative for agitation and dysphoric mood. The patient is nervous/anxious.      Objective:     Vital Signs (Most Recent):  Temp: 97.8 °F (36.6 °C) (03/07/19 1415)  Pulse: 64 (03/07/19 1515)  Resp: 10 (03/07/19 1515)  BP: 130/63 (03/07/19 0801)  SpO2: (!) 94 % (03/07/19 1515) Vital Signs (24h Range):  Temp:  [97.6 °F (36.4 °C)-97.8 °F (36.6 °C)] 97.8 °F (36.6 °C)  Pulse:  [64-78] 64  Resp:  [10-20] 10  SpO2:  [94 %-98 %] 94 %  BP: (130)/(63) 130/63     Weight: 90.3 kg (199 lb)  Body mass index is 26.25 kg/m².    SpO2: (!) 94 %        Intake/Output - Last 3 Shifts     None           Lines/Drains/Airways     Peripheral Intravenous Line                 Peripheral IV - Single Lumen 03/07/19 0745 Right Forearm less than 1 day                 STS Risk Score:  Risk of Mortality:3.505%  Renal Failure:9.222%  Permanent Stroke:1.617%  Prolonged Ventilation:11.028%  DSW Infection:0.222%  Reoperation:4.037%  Morbidity or Mortality:25.281%  Short Length of Stay:23.613%  Long Length of Stay:9.374%      Physical Exam   Constitutional: He is oriented to person, place, and time. He appears well-developed and well-nourished. No distress.   HENT:   Head: Normocephalic and atraumatic.   Mouth/Throat: Oropharynx is clear and moist.   Eyes: Pupils are equal, round, and reactive to light.   Neck: Normal range of motion. Neck supple.   Cardiovascular: Normal rate, regular rhythm and normal heart sounds.   Pulmonary/Chest: Effort normal.   Abdominal: Soft. Bowel sounds are normal.   Musculoskeletal: He exhibits no  edema.   The patient is status post right BKA.  The BKA has a small ulceration about 3 mm in diameter on the stump of the tibial bone.  No evidence of cellulitis observed.  The patient is also status post left midfoot amputation.   Neurological: He is alert and oriented to person, place, and time.   Skin: Skin is warm and dry. He is not diaphoretic.   Psychiatric: He has a normal mood and affect. His behavior is normal.       Significant Labs:  All pertinent labs from the last 24 hours have been reviewed.    Significant Diagnostics:  I have reviewed all pertinent imaging results/findings within the past 24 hours.

## 2019-03-07 NOTE — H&P
[facility] -   Cardiology  History and Physical     Patient Name: Kodi Ribeiro  MRN: [id number]  Admission Date: 3/7/2019  Code Status: Prior   Attending Provider: Adriel Boone MD   Primary Care Physician: Norma Nuñez MD  Principal Problem:Unstable angina    Patient information was obtained from patient, past medical records and ER records.     Subjective:         HPI:  Pt presents for elective LHC.  He has CAD, PAD, AAA, CRI, carotid disease (h/o L CEA 8/17, Dr Jamil), R LE BKA.  - nuclear stress MPI Nov 2018.  Echo 12/18 normal LVEF, DD, LVH.  He has h/o CAD with 3 stents 2000 for MI by Dr. Gonzalez.  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  H/o remote AAA, then graft infection and aorto-bifem bypass 2014.  Pt has been having increased chest pain sxs, suggestive of worsening anginal sxs although with some atypical features (see my last 2 clinic notes for further details).  Has had several ER visits for cp sxs.  He was advised to have [facility] for further eval of his sxs, delayed so pt could be evaluated by Nephrology and hematology for CRI and chronic anemia.  Pt cleared for cath by Nephrology.            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    Late complications of amputation stump     rseolved with further amputation( MRSA then none since 2014)    Lipoma of colon     colonoscopy 2/21/2014    Myocardial infarction     per patient 2000 & 9/2012    Peripheral vascular disease     Phantom limb syndrome     patient reports  only intermittent not problematic, not worsening    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    Spinal cord disease     L4L5 disc    Stroke     Tobacco dependence     resolved    Ureteral stent retained        Past Surgical History:   Procedure Laterality Date    ABDOMINAL AORTIC ANEURYSM REPAIR      ABDOMINAL AORTIC ANEURYSM REPAIR  1996/2014    AMPUTATION, LOWER LIMB      AORTA - BILATERAL FEMORAL ARTERY BYPASS GRAFT  2014    Left and right leg    COLONOSCOPY N/A 2/21/2014    [facility] by [facility] at [facility] ENDO    CORONARY ANGIOPLASTY WITH STENT PLACEMENT  2000    Three placed in heart    CREATION, BYPASS, ARTERIAL, AORTA TO FEMORAL, BILATERAL Right 3/16/2014    Performed by Stefan Jamil [facility] at [facility] OR    CYSTOSCOPY WITH STENT EXCHANGE/RETROGRADE PYELOGRAM Left 4/2/2015    Performed by Scooter Jin IV, MD at [facility] OR    CYSTOSCOPY WITH STENT PLACEMENT Left 5/4/2017    Performed by Scooter Jin IV, MD at [facility] OR    CYSTOSCOPY WITH STENT PLACEMENT Left 4/28/2016    Performed by Scooter Jin IV, MD at [facility] OR    CYSTOSCOPY, WITH RETROGRADE PYELOGRAM Left 5/29/2018    Performed by Scooter Jin IV, [facility] at [facility] OR    CYSTOSCOPY, WITH URETERAL STENT INSERTION Left 5/29/2018    Performed by Scooter Jin IV, MD at [facility] OR    CYSTOSCOPY, WITH URETERAL STENT INSERTION Left 1/23/2014    Performed by Scooter Jin IV, MD at [facility] OR    CYSTOSCOPY, WITH URETERAL STENT REMOVAL Left 5/29/2018    Performed by Scooter Jin IV, MD at [facility] OR    EGD (ESOPHAGOGASTRODUODENOSCOPY) N/A 2/21/2014    Performed by Isaac Tanner MD at [facility] ENDO    ENDARTERECTOMY-CAROTID Left 8/18/2017    Performed by Stefan [facility] at [facility] OR    FOOT AMPUTATION THROUGH METATARSAL  1996    left    FOOT SURGERY Bilateral 1980's    per patient multiple toe amputations prior to.  partial foot amputation:first great toe then other toes     KIDNEY SURGERY  2014    per patient  separation of horseshoe kidney @ time of AAA repair    LUNG LOBECTOMY Right 1970s    per patient not cancer    PYELOGRAM-RETROGRADE Left 5/4/2017    Per[facility] by Scooter [facility] at [facility]    RESECTION-BOWEL N/A 3/16/2014    Performed by Stefan [facility] at [facility] OR    right below knee amputation  2009 (approx)    SMALL INTESTINE SURGERY  2014    per patient partial @ time of aaa repair  not small bowel - large bowel bowel compromised bythtwe AAAbowel    TONSILLECTOMY  1955 aprox    URETERAL STENT PLACEMENT Left     annually replaced since 2012 or so  Dr Jin       Review of patient's allergies indicates:   Allergen Reactions    Morphine Itching       No current facility-administered medications on file prior to encounter.      Current Outpatient Medications on File Prior to Encounter   Medication Sig    amLODIPine (NORVASC) 10 MG tablet TAKE 1 TABLET EVERY DAY    aspirin (ECOTRIN) 81 MG EC tablet Take 1 tablet (81 mg total) by mouth once daily.    carvedilol (COREG) 12.5 MG tablet Take 1 tablet (12.5 mg total) by mouth 2 (two) times daily with meals.    clopidogrel (PLAVIX) 75 mg tablet TAKE 1 TABLET EVERY DAY    docusate sodium (COLACE) 100 MG capsule Take 1 capsule (100 mg total) by mouth 2 (two) times daily.    isosorbide mononitrate (IMDUR) 60 MG 24 hr tablet Take 1 tablet (60 mg total) by mouth once daily.    losartan (COZAAR) 100 MG tablet Take 1 tablet (100 mg total) by mouth once daily.    nitroglycerin (NITROSTAT) 0.6 MG Subl Place 1 tablet (0.6 mg total) under the tongue every 5 (five) minutes as needed (max 3/ per episode).    pantoprazole (PROTONIX) 40 MG tablet Take 1 tablet (40 mg total) by mouth once daily.    pravastatin (PRAVACHOL) 80 MG tablet TAKE 1 TABLET EVERY EVENING    ergocalciferol (ERGOCALCIFEROL) 50,000 unit Cap Take 1 capsule (50,000 Units total) by mouth every 7 days.    oxyCODONE-acetaminophen (PERCOCET) 5-325 mg per tablet Take 1-2 tablets by mouth  every 4 (four) hours as needed for Pain.    triamcinolone acetonide 0.025% (KENALOG) 0.025 % cream Apply topically 2 (two) times daily.     Family History     Problem Relation (Age of Onset)    COPD Mother    Cancer Mother    Diabetes Daughter    Heart disease Father        Tobacco Use    Smoking status: Former Smoker     Packs/day: 1.00     Years: 15.00     Pack years: 15.00     Last attempt to quit: 1/1/2009     Years since quitting: 10.1    Smokeless tobacco: Never Used   Substance and Sexual Activity    Alcohol use: No    Drug use: No     Comment: Is on prescription opiod, no non prescribed use    Sexual activity: Not Currently     Partners: Female     Review of Systems   Constitution: Negative.   HENT: Negative.    Eyes: Negative.    Cardiovascular: Positive for chest pain.   Respiratory: Negative.    Endocrine: Negative.    Hematologic/Lymphatic: Negative.    Skin: Negative.    Musculoskeletal: Negative.    Gastrointestinal: Negative.    Genitourinary: Negative.    Neurological: Negative.    Psychiatric/Behavioral: Negative.    Allergic/Immunologic: Negative.      Objective:     Vital Signs (Most Recent):  Temp: 97.6 °F (36.4 °C) (03/07/19 0806)  Pulse: 78 (03/07/19 0806)  Resp: 20 (03/07/19 0806)  BP: 130/63 (03/07/19 0801)  SpO2: 98 % (03/07/19 0805) Vital Signs (24h Range):  Temp:  [97.6 °F (36.4 °C)] 97.6 °F (36.4 °C)  Pulse:  [78] 78  Resp:  [20] 20  SpO2:  [98 %] 98 %  BP: (130)/(63) 130/63     Weight: 90.3 kg (199 lb)  Body mass index is 26.25 kg/m².    SpO2: 98 %       No intake or output data in the 24 hours ending 03/07/19 0828    Lines/Drains/Airways     Peripheral Intravenous Line                 Peripheral IV - Single Lumen 03/07/19 0745 Right Forearm less than 1 day                Physical Exam   Constitutional: He is oriented to person, place, and time. He appears well-developed and well-nourished.   HENT:   Head: Normocephalic.   Neck: Normal range of motion. Neck supple. Normal carotid  pulses, no hepatojugular reflux and no JVD present. Carotid bruit is not present. No thyromegaly present.   Cardiovascular: Normal rate, regular rhythm, S1 normal and S2 normal. PMI is not displaced. Exam reveals no S3, no S4, no distant heart sounds, no friction rub, no midsystolic click and no opening snap.   No murmur heard.  Pulses:       Radial pulses are 2+ on the right side, and 2+ on the left side.   Pulmonary/Chest: Effort normal and breath sounds normal. He has no wheezes. He has no rales.   Abdominal: Soft. Bowel sounds are normal. He exhibits no distension, no abdominal bruit, no ascites and no mass. There is no tenderness.   Musculoskeletal: He exhibits no edema.   Neurological: He is alert and oriented to person, place, and time.   Skin: Skin is warm.   Psychiatric: He has a normal mood and affect. His behavior is normal.   Nursing note and vitals reviewed.      Significant Labs:   BMP:   Recent Labs   Lab 03/05/19  1052   GLU 94      K 4.9   *   CO2 19*   BUN 33*   CREATININE 2.4*   CALCIUM 8.7   , CMP   Recent Labs   Lab 03/05/19  1052      K 4.9   *   CO2 19*   GLU 94   BUN 33*   CREATININE 2.4*   CALCIUM 8.7   PROT 7.6   ALBUMIN 3.4*   BILITOT 0.5   ALKPHOS 162*   AST 11   ALT 8*   ANIONGAP 6*   ESTGFRAFRICA 30*   EGFRNONAA 26*   , CBC   Recent Labs   Lab 03/05/19  1052   WBC 6.86   HGB 9.6*   HCT 30.4*      , INR No results for input(s): INR, PROTIME in the last 48 hours. and Troponin No results for input(s): TROPONINI in the last 48 hours.    Significant Imaging: Echocardiogram:   2D echo with color flow doppler:   Results for orders placed or performed in visit on 12/13/18   2D echo with color flow doppler   Result Value Ref Range    QEF 55 55 - 65    Diastolic Dysfunction Yes (A)     Est. PA Systolic Pressure 30.46     Tricuspid Valve Regurgitation TRIVIAL      Assessment and Plan:     Active Diagnoses:    Diagnosis Date Noted POA    PRINCIPAL PROBLEM:  Unstable  angina [I20.0] 03/06/2019 Yes      Problems Resolved During this Admission:       VTE Risk Mitigation (From admission, onward)    None          Pt advised to undergo left heart catheterization with possible PCI if warranted.  Pt advised of all risks and benefits of procedure.  Pt advised of alternative approaches and treatment strategies to include medical therapy, PCI as well as surgical revascularization with possible CABG.  All questions answered.  Pt understands the risks of worsening renal failure/contrast nephropathy, risk of dialysis as well as risk of worsening anemia.  Continue pre cath and post cath IV fluids.        Adriel Boone MD  Cardiology   [facility] -

## 2019-03-07 NOTE — NURSING
Spoke with Dr Boles now regarding consult as requested per Dr Boone. Dr. Boles states he will see  sometimes this afternoon.

## 2019-03-07 NOTE — OP NOTE
[facility] -   Cardiac Catheterization  Procedure & Discharge Note    SUMMARY     Kodi Ribeiro  [id number]  Norma Nuñez MD    Date of Procedure: 3/7/2019    Procedure:  1.  LHC  2.  LEFT VENTRICULOGRAM  3. CORONARY ANGIOGRAM  4.  LEFT SUBCLAVIAN ANGIOGRAM    Provider: Adriel Boone MD    Assisting Provider:  Keven Stockton    Indications: He was referred for cardiac catheterization to further evaluate worsening angina.    Pre-Procedure Diagnosis:  Angina pectoris    Post-Procedure Diagnosis:  Same + multivessel CAD    Anesthesia: RN IV Sedation    Description of the Findings of the Procedure:     The risks, benefits, complications, treatment options, and expected outcomes were discussed with the patient. The patient and/or family concurred with the proposed plan, giving informed consent. Patient was brought to the cath lab after IV hydration was begun and oral premedication was given.    Findings:  Multivessel CAD  Normal LVEF  Left radial TR band  See report    Complications: None; patient tolerated the procedure well.    Estimated Blood Loss (EBL): Minimal           Implants: none    Specimens: none    Condition: stable    Disposition: PACU - hemodynamically stable.    Attestation: I was present and scrubbed for the entire procedure.     Recommendations:    Usual post cath care  Maximize medical tx for CAD  Elective CVT consult for CABG  Cardiac diet  D/c pt home today after 6 hours IV fluids  F/u clinic 1 - 2 weeks

## 2019-03-07 NOTE — HPI
The patient is a 69-year-old male with coronary artery disease status post MI and status post stenting, abdominal aortic aneurysm status post resection, status post graft infection, MRSA infection, urosepsis status post axillo-femoral bypass, chronic renal insufficiency, status post right BKA, carotid disease status post left CEA, peripheral vascular disease and CVA who was worked up for complains of retrosternal and epigastric pain. The patient was in his usual state of health until about 1 and half months ago when he started noticing retrosternal chest pain which is brought on by minimal exertion.  The episodes are associated with shortness of breath.  The patient diff denies diaphoresis, orthopnea or PND, palpitations, or ankle swelling. The patient had a cardiac catheterization done today which shows multivessel coronary artery disease.

## 2019-03-07 NOTE — CONSULTS
[facility] -   Cardiothoracic Surgery  Consult Note    Patient Name: Kodi Ribeiro  MRN: [id number]  Admission Date: 3/7/2019  Attending Physician: Adriel Boone MD  Referring Provider: Adriel Boone MD    Patient information was obtained from patient, spouse/SO and past medical records.     Consults  Subjective:     Principal Problem: Unstable angina    History of Present Illness:   The patient is a 69-year-old male with coronary artery disease status post MI and status post stenting, abdominal aortic aneurysm status post resection, status post graft infection, MRSA infection, urosepsis status post axillo-femoral bypass, chronic renal insufficiency, status post right BKA, carotid disease status post left CEA, peripheral vascular disease and CVA who was worked up for complains of retrosternal and epigastric pain. The patient was in his usual state of health until about 1 and half months ago when he started noticing retrosternal chest pain which is brought on by minimal exertion.  The episodes are associated with shortness of breath.  The patient diff denies diaphoresis, orthopnea or PND, palpitations, or ankle swelling. The patient had a cardiac catheterization done today which shows multivessel coronary artery disease.        No current facility-administered medications on file prior to encounter.      Current Outpatient Medications on File Prior to Encounter   Medication Sig    amLODIPine (NORVASC) 10 MG tablet TAKE 1 TABLET EVERY DAY    aspirin (ECOTRIN) 81 MG EC tablet Take 1 tablet (81 mg total) by mouth once daily.    carvedilol (COREG) 12.5 MG tablet Take 1 tablet (12.5 mg total) by mouth 2 (two) times daily with meals.    clopidogrel (PLAVIX) 75 mg tablet TAKE 1 TABLET EVERY DAY    docusate sodium (COLACE) 100 MG capsule Take 1 capsule (100 mg total) by mouth 2 (two) times daily.    isosorbide mononitrate (IMDUR) 60 MG 24 hr tablet Take 1 tablet (60 mg total) by mouth once daily.     losartan (COZAAR) 100 MG tablet Take 1 tablet (100 mg total) by mouth once daily.    nitroglycerin (NITROSTAT) 0.6 MG Subl Place 1 tablet (0.6 mg total) under the tongue every 5 (five) minutes as needed (max 3/ per episode).    pantoprazole (PROTONIX) 40 MG tablet Take 1 tablet (40 mg total) by mouth once daily.    pravastatin (PRAVACHOL) 80 MG tablet TAKE 1 TABLET EVERY EVENING    ergocalciferol (ERGOCALCIFEROL) 50,000 unit Cap Take 1 capsule (50,000 Units total) by mouth every 7 days.    oxyCODONE-acetaminophen (PERCOCET) 5-325 mg per tablet Take 1-2 tablets by mouth every 4 (four) hours as needed for Pain.    [DISCONTINUED] triamcinolone acetonide 0.025% (KENALOG) 0.025 % cream Apply topically 2 (two) times daily.       Review of patient's allergies indicates:   Allergen Reactions    Morphine Itching       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    Late complications of amputation stump     rseolved with further amputation( MRSA then none since 2014)    Lipoma of colon     colonoscopy 2/21/2014    Myocardial infarction     per patient 2000 & 9/2012    Peripheral vascular disease     Phantom limb syndrome     patient reports only intermittent not problematic, not worsening    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    Spinal cord disease     L4L5 disc    Stroke     Tobacco dependence     resolved    Ureteral stent retained      Past Surgical History:   Procedure Laterality Date    ABDOMINAL AORTIC ANEURYSM REPAIR      ABDOMINAL AORTIC ANEURYSM REPAIR   1996/2014    AMPUTATION, LOWER LIMB      AORTA - BILATERAL FEMORAL ARTERY BYPASS GRAFT  2014    Left and right leg    COLONOSCOPY N/A 2/21/2014    Performed by [facility] at [facility] ENDO    CORONARY ANGIOPLASTY WITH STENT PLACEMENT  2000    Three placed in heart    CREATION, BYPASS, ARTERIAL, AORTA TO FEMORAL, BILATERAL Right 3/16/2014    Performed by Stefan [facility]il [facility] at [facility] OR    CYSTOSCOPY WITH STENT EXCHANGE/RETROGRADE PYELOGRAM Left 4/2/2015    Performed by Scooter Jin IV, MD at [facility] OR    CYSTOSCOPY WITH STENT PLACEMENT Left 5/4/2017    Performed by Sc[facility], [facility] at [facility] OR    CYSTOSCOPY WITH STENT PLACEMENT Left 4/28/2016    Performed by Scooter [facility] IV, [facility] at [facility]    CYSTOSCOPY, WITH RETROGRADE PYELOGRAM Left 5/29/2018    Performed by Sc[facility] IV, [facility] at [facility]    CYSTOSCOPY, WITH URETERAL STENT INSERTION Left 5/29/2018    Performed by Scooter [facility] IV, [facility] at [facility]    CYSTOSCOPY, WITH URETERAL STENT INSERTION Left 1/23/2014    Performed by Scooter [facility] IV, [facility] at [facility]    CYSTOSCOPY, WITH URETERAL STENT REMOVAL Left 5/29/2018    Performed by Scooter [facility] IV, MD at [facility]    EGD (ESOPHAGOGASTRODUODENOSCOPY) N/A 2/21/2014    Performed by Isaac Tanner MD at [facility] ENDO    ENDARTERECTOMY-CAROTID Left 8/18/2017    Performed by [facility] at [facility] OR    FOOT AMPUTATION THROUGH METATARSAL  1996    left    FOOT SURGERY Bilateral 1980's    per patient multiple toe amputations prior to.  partial foot amputation:first great toe then other toes     KIDNEY SURGERY  2014    per patient separation of horseshoe kidney @ time of AAA repair    LUNG LOBECTOMY Right 1970s    per patient not cancer    PYELOGRAM-RETROGRADE Left 5/4/2017    Performed by Scooter [facility] IV, [facility] at [facility]    RESECTION-BOWEL N/A 3/16/2014    [facility]formed by [facility] at [facility]    right below knee amputation  2009 (approx)    SMALL INTESTINE  SURGERY  2014    per patient partial @ time of aaa repair  not small bowel - large bowel bowel compromised bythtwe AAAbowel    TONSILLECTOMY  1955 aprox    URETERAL STENT PLACEMENT Left     annually replaced since 2012 or so  Dr Jin     Family History     Problem Relation (Age of Onset)    COPD Mother    Cancer Mother    Diabetes Daughter    Heart disease Father        Tobacco Use    Smoking status: Former Smoker     Packs/day: 1.00     Years: 15.00     Pack years: 15.00     Last attempt to quit: 1/1/2009     Years since quitting: 10.1    Smokeless tobacco: Never Used   Substance and Sexual Activity    Alcohol use: No    Drug use: No     Comment: Is on prescription opiod, no non prescribed use    Sexual activity: Not Currently     Partners: Female     Review of Systems   Constitutional: Positive for activity change. Negative for appetite change, chills and diaphoresis.   HENT: Positive for congestion, hearing loss and sinus pain. Negative for facial swelling.    Eyes: Negative for discharge and visual disturbance.   Respiratory: Positive for chest tightness and shortness of breath. Negative for cough and choking.    Cardiovascular: Positive for chest pain. Negative for palpitations and leg swelling.   Gastrointestinal: Negative for abdominal distention, abdominal pain and blood in stool.   Endocrine: Negative for polyuria.   Genitourinary: Negative for difficulty urinating, dysuria and hematuria.   Musculoskeletal: Positive for back pain.   Allergic/Immunologic: Positive for environmental allergies. Negative for food allergies.   Neurological: Negative for dizziness, light-headedness and headaches.   Hematological: Bruises/bleeds easily.   Psychiatric/Behavioral: Negative for agitation and dysphoric mood. The patient is nervous/anxious.      Objective:     Vital Signs (Most Recent):  Temp: 97.8 °F (36.6 °C) (03/07/19 1415)  Pulse: 64 (03/07/19 1515)  Resp: 10 (03/07/19 1515)  BP: 130/63 (03/07/19  0801)  SpO2: (!) 94 % (03/07/19 1515) Vital Signs (24h Range):  Temp:  [97.6 °F (36.4 °C)-97.8 °F (36.6 °C)] 97.8 °F (36.6 °C)  Pulse:  [64-78] 64  Resp:  [10-20] 10  SpO2:  [94 %-98 %] 94 %  BP: (130)/(63) 130/63     Weight: 90.3 kg (199 lb)  Body mass index is 26.25 kg/m².    SpO2: (!) 94 %        Intake/Output - Last 3 Shifts     None           Lines/Drains/Airways     Peripheral Intravenous Line                 Peripheral IV - Single Lumen 03/07/19 0745 Right Forearm less than 1 day                 STS Risk Score:  Risk of Mortality:3.505%  Renal Failure:9.222%  Permanent Stroke:1.617%  Prolonged Ventilation:11.028%  DSW Infection:0.222%  Reoperation:4.037%  Morbidity or Mortality:25.281%  Short Length of Stay:23.613%  Long Length of Stay:9.374%      Physical Exam   Constitutional: He is oriented to person, place, and time. He appears well-developed and well-nourished. No distress.   HENT:   Head: Normocephalic and atraumatic.   Mouth/Throat: Oropharynx is clear and moist.   Eyes: Pupils are equal, round, and reactive to light.   Neck: Normal range of motion. Neck supple.   Cardiovascular: Normal rate, regular rhythm and normal heart sounds.   Pulmonary/Chest: Effort normal.   Abdominal: Soft. Bowel sounds are normal.   Musculoskeletal: He exhibits no edema.   The patient is status post right BKA.  The BKA has a small ulceration about 3 mm in diameter on the stump of the tibial bone.  No evidence of cellulitis observed.  The patient is also status post left midfoot amputation.   Neurological: He is alert and oriented to person, place, and time.   Skin: Skin is warm and dry. He is not diaphoretic.   Psychiatric: He has a normal mood and affect. His behavior is normal.       Significant Labs:  All pertinent labs from the last 24 hours have been reviewed.    Significant Diagnostics:  I have reviewed all pertinent imaging results/findings within the past 24 hours.    Assessment/Plan:     NYHA Score: NYHA III: marked  limitation of physical activity, comfortable at rest    * Unstable angina    The patient is a 69-year-old male with coronary artery disease status post MI and status post stenting, abdominal aortic aneurysm status post resection, status post graft infection, MRSA infection, urosepsis status post axillo-femoral bypass, chronic renal insufficiency, status post right BKA, carotid artery disease status post left CEA, peripheral vascular disease and CVA who was worked up for complains of retrosternal and epigastric pain. Cardiac catheterization shows multivessel coronary artery disease.  The patient is now being assessed for coronary artery bypass grafting.  The patient is a candidate for coronary artery bypass grafting.  The patient has a high risk for morbidity and mortality due to his multiple comorbidities.  The risks and benefits of surgery were discussed with the patient and his family.  They would like to think about their options.  The patient has been scheduled to be seen in cardiothoracic surgery Clinic on 03/13/2019.         Thank you for your consult. I will follow-up with patient. Please contact us if you have any additional questions.    Bunny Bauman MD  Cardiothoracic Surgery  [facility] -

## 2019-03-07 NOTE — ASSESSMENT & PLAN NOTE
The patient is a 69-year-old male with coronary artery disease status post MI and status post stenting, abdominal aortic aneurysm status post resection, status post graft infection, MRSA infection, urosepsis status post axillo-femoral bypass, chronic renal insufficiency, status post right BKA, carotid artery disease status post left CEA, peripheral vascular disease and CVA who was worked up for complains of retrosternal and epigastric pain. Cardiac catheterization shows multivessel coronary artery disease.  The patient is now being assessed for coronary artery bypass grafting.  The patient is a candidate for coronary artery bypass grafting.  The patient has a high risk for morbidity and mortality due to his multiple comorbidities.  The risks and benefits of surgery were discussed with the patient and his family.  They would like to think about their options.  The patient has been scheduled to be seen in cardiothoracic surgery Clinic on 03/13/2019.

## 2019-03-08 ENCOUNTER — TELEPHONE (OUTPATIENT)
Dept: CARDIOLOGY | Facility: CLINIC | Age: 69
End: 2019-03-08

## 2019-03-08 NOTE — TELEPHONE ENCOUNTER
----- Message from Alyx Agrawal sent at 3/8/2019 10:32 AM CST -----  Contact: patient  Patient needs call back rg open heart surgery [phone]

## 2019-03-08 NOTE — TELEPHONE ENCOUNTER
Spoke with Mrs. Shepherd and they are trying to get CABG for next week versus appointment on Wednesday.  Will forward this message to his office since failed aatempt to contact them at [facility]

## 2019-03-08 NOTE — PROGRESS NOTES
I will be sending this patient to you. I did the initial workup for anemia. He was referred to us by Cardiology. 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 I talked to him about a bone marrow biopsy but he was resistant.    April

## 2019-03-11 ENCOUNTER — TELEPHONE (OUTPATIENT)
Dept: CARDIOTHORACIC SURGERY | Facility: CLINIC | Age: 69
End: 2019-03-11

## 2019-03-11 NOTE — TELEPHONE ENCOUNTER
Called and spoke to Mrs Schaeffer, Mr Shepherd's wife. - Mrs Schaeffer advised me that they had seen Dr Bauman in the hospital last week where Dr Bauman went over Mr Shepherd needing a Bypass and the risks involved. Mrs Schaeffer stated that Dr Bauman advised them to take a few days to discuss what they wanted to do and then come into the clinic this Wednesday 03/13/19 to let him know what they decided. - Mrs Schaeffer advised me that they have discussed it and there isn't any reason to come into the clinic. Mrs Schaeffer stated that her and Mr Shepherd are in agreement that the Bypass Surgery is needed and they just want the surgery to be scheduled. - I advised Mrs Schaeffer that I would send Dr Bauman and his NP Omkar a message letting them know this. I also advised her that I would keep the Consultation appt that is scheduled for this Wed on the books incase Dr Bauman advises me they still need to be seen.       ----- Message from Salvador Conrad sent at 3/11/2019  1:05 PM CDT -----  Contact: pt's spouse  He's calling in regards to there decision with scheduling a procedure for bypass surgery, pls advise, [phone] (home)

## 2019-03-13 ENCOUNTER — TELEPHONE (OUTPATIENT)
Dept: CARDIOLOGY | Facility: HOSPITAL | Age: 69
End: 2019-03-13

## 2019-03-13 ENCOUNTER — OFFICE VISIT (OUTPATIENT)
Dept: CARDIOLOGY | Facility: CLINIC | Age: 69
End: 2019-03-13
Payer: MEDICARE

## 2019-03-13 ENCOUNTER — INITIAL CONSULT (OUTPATIENT)
Dept: CARDIOTHORACIC SURGERY | Facility: CLINIC | Age: 69
End: 2019-03-13
Payer: MEDICARE

## 2019-03-13 ENCOUNTER — DOCUMENTATION ONLY (OUTPATIENT)
Dept: CARDIOLOGY | Facility: CLINIC | Age: 69
End: 2019-03-13

## 2019-03-13 ENCOUNTER — TELEPHONE (OUTPATIENT)
Dept: CARDIOLOGY | Facility: CLINIC | Age: 69
End: 2019-03-13

## 2019-03-13 VITALS
WEIGHT: 199 LBS | HEART RATE: 66 BPM | SYSTOLIC BLOOD PRESSURE: 142 MMHG | HEIGHT: 73 IN | BODY MASS INDEX: 26.37 KG/M2 | OXYGEN SATURATION: 98 % | DIASTOLIC BLOOD PRESSURE: 74 MMHG

## 2019-03-13 VITALS
DIASTOLIC BLOOD PRESSURE: 60 MMHG | HEART RATE: 72 BPM | BODY MASS INDEX: 25.68 KG/M2 | WEIGHT: 194.69 LBS | SYSTOLIC BLOOD PRESSURE: 136 MMHG | TEMPERATURE: 98 F

## 2019-03-13 DIAGNOSIS — I25.2 OLD MI (MYOCARDIAL INFARCTION): Chronic | ICD-10-CM

## 2019-03-13 DIAGNOSIS — N18.30 CKD (CHRONIC KIDNEY DISEASE) STAGE 3, GFR 30-59 ML/MIN: Chronic | ICD-10-CM

## 2019-03-13 DIAGNOSIS — I65.22 LEFT CAROTID ARTERY STENOSIS: ICD-10-CM

## 2019-03-13 DIAGNOSIS — I20.0 UNSTABLE ANGINA: Primary | ICD-10-CM

## 2019-03-13 DIAGNOSIS — Z86.73 HISTORY OF CVA (CEREBROVASCULAR ACCIDENT): ICD-10-CM

## 2019-03-13 DIAGNOSIS — Z89.511 STATUS POST BELOW KNEE AMPUTATION OF RIGHT LOWER EXTREMITY: Chronic | ICD-10-CM

## 2019-03-13 DIAGNOSIS — I73.9 PVD (PERIPHERAL VASCULAR DISEASE): Chronic | ICD-10-CM

## 2019-03-13 DIAGNOSIS — I10 ESSENTIAL HYPERTENSION: Primary | Chronic | ICD-10-CM

## 2019-03-13 DIAGNOSIS — I35.0 NONRHEUMATIC AORTIC VALVE STENOSIS: Chronic | ICD-10-CM

## 2019-03-13 DIAGNOSIS — I63.412 CEREBRAL INFARCTION DUE TO EMBOLISM OF LEFT MIDDLE CEREBRAL ARTERY: ICD-10-CM

## 2019-03-13 DIAGNOSIS — I20.9 AP (ANGINA PECTORIS): ICD-10-CM

## 2019-03-13 PROCEDURE — 99999 PR PBB SHADOW E&M-EST. PATIENT-LVL III: ICD-10-PCS | Mod: PBBFAC,HCNC,, | Performed by: THORACIC SURGERY (CARDIOTHORACIC VASCULAR SURGERY)

## 2019-03-13 PROCEDURE — 99214 OFFICE O/P EST MOD 30 MIN: CPT | Mod: HCNC,S$GLB,, | Performed by: THORACIC SURGERY (CARDIOTHORACIC VASCULAR SURGERY)

## 2019-03-13 PROCEDURE — 99999 PR PBB SHADOW E&M-EST. PATIENT-LVL III: ICD-10-PCS | Mod: PBBFAC,HCNC,, | Performed by: NURSE PRACTITIONER

## 2019-03-13 PROCEDURE — 99999 PR PBB SHADOW E&M-EST. PATIENT-LVL III: CPT | Mod: PBBFAC,HCNC,, | Performed by: NURSE PRACTITIONER

## 2019-03-13 PROCEDURE — 99214 PR OFFICE/OUTPT VISIT, EST, LEVL IV, 30-39 MIN: ICD-10-PCS | Mod: HCNC,S$GLB,, | Performed by: NURSE PRACTITIONER

## 2019-03-13 PROCEDURE — 3075F PR MOST RECENT SYSTOLIC BLOOD PRESS GE 130-139MM HG: ICD-10-PCS | Mod: HCNC,CPTII,S$GLB, | Performed by: THORACIC SURGERY (CARDIOTHORACIC VASCULAR SURGERY)

## 2019-03-13 PROCEDURE — 1101F PR PT FALLS ASSESS DOC 0-1 FALLS W/OUT INJ PAST YR: ICD-10-PCS | Mod: HCNC,CPTII,S$GLB, | Performed by: NURSE PRACTITIONER

## 2019-03-13 PROCEDURE — 3077F SYST BP >= 140 MM HG: CPT | Mod: HCNC,CPTII,S$GLB, | Performed by: NURSE PRACTITIONER

## 2019-03-13 PROCEDURE — 99499 RISK ADDL DX/OHS AUDIT: ICD-10-PCS | Mod: HCNC,S$GLB,, | Performed by: NURSE PRACTITIONER

## 2019-03-13 PROCEDURE — 3078F PR MOST RECENT DIASTOLIC BLOOD PRESSURE < 80 MM HG: ICD-10-PCS | Mod: HCNC,CPTII,S$GLB, | Performed by: NURSE PRACTITIONER

## 2019-03-13 PROCEDURE — 1101F PR PT FALLS ASSESS DOC 0-1 FALLS W/OUT INJ PAST YR: ICD-10-PCS | Mod: HCNC,CPTII,S$GLB, | Performed by: THORACIC SURGERY (CARDIOTHORACIC VASCULAR SURGERY)

## 2019-03-13 PROCEDURE — 3077F PR MOST RECENT SYSTOLIC BLOOD PRESSURE >= 140 MM HG: ICD-10-PCS | Mod: HCNC,CPTII,S$GLB, | Performed by: NURSE PRACTITIONER

## 2019-03-13 PROCEDURE — 3078F DIAST BP <80 MM HG: CPT | Mod: HCNC,CPTII,S$GLB, | Performed by: THORACIC SURGERY (CARDIOTHORACIC VASCULAR SURGERY)

## 2019-03-13 PROCEDURE — 3075F SYST BP GE 130 - 139MM HG: CPT | Mod: HCNC,CPTII,S$GLB, | Performed by: THORACIC SURGERY (CARDIOTHORACIC VASCULAR SURGERY)

## 2019-03-13 PROCEDURE — 99999 PR PBB SHADOW E&M-EST. PATIENT-LVL III: CPT | Mod: PBBFAC,HCNC,, | Performed by: THORACIC SURGERY (CARDIOTHORACIC VASCULAR SURGERY)

## 2019-03-13 PROCEDURE — 1101F PT FALLS ASSESS-DOCD LE1/YR: CPT | Mod: HCNC,CPTII,S$GLB, | Performed by: NURSE PRACTITIONER

## 2019-03-13 PROCEDURE — 3078F DIAST BP <80 MM HG: CPT | Mod: HCNC,CPTII,S$GLB, | Performed by: NURSE PRACTITIONER

## 2019-03-13 PROCEDURE — 99214 PR OFFICE/OUTPT VISIT, EST, LEVL IV, 30-39 MIN: ICD-10-PCS | Mod: HCNC,S$GLB,, | Performed by: THORACIC SURGERY (CARDIOTHORACIC VASCULAR SURGERY)

## 2019-03-13 PROCEDURE — 1101F PT FALLS ASSESS-DOCD LE1/YR: CPT | Mod: HCNC,CPTII,S$GLB, | Performed by: THORACIC SURGERY (CARDIOTHORACIC VASCULAR SURGERY)

## 2019-03-13 PROCEDURE — 99214 OFFICE O/P EST MOD 30 MIN: CPT | Mod: HCNC,S$GLB,, | Performed by: NURSE PRACTITIONER

## 2019-03-13 PROCEDURE — 99499 UNLISTED E&M SERVICE: CPT | Mod: HCNC,S$GLB,, | Performed by: NURSE PRACTITIONER

## 2019-03-13 PROCEDURE — 3078F PR MOST RECENT DIASTOLIC BLOOD PRESSURE < 80 MM HG: ICD-10-PCS | Mod: HCNC,CPTII,S$GLB, | Performed by: THORACIC SURGERY (CARDIOTHORACIC VASCULAR SURGERY)

## 2019-03-13 RX ORDER — PRAVASTATIN SODIUM 80 MG/1
TABLET ORAL
Qty: 90 TABLET | Refills: 3 | OUTPATIENT
Start: 2019-03-13

## 2019-03-13 NOTE — PROGRESS NOTES
Subjective:      Patient ID: Kodi Ribeiro is a 69 y.o. male.    Chief Complaint: Consult (Hospital Follow Up)    HPI: The patient is a 69-year-old male with coronary artery disease status post MI and status post stenting, abdominal aortic aneurysm status post resection, status post graft infection, MRSA infection, urosepsis status post axillo-femoral bypass, chronic renal insufficiency, status post right BKA, carotid disease status post left CEA, peripheral vascular disease and CVA who was worked up for complains of retrosternal and epigastric pain. Patient is status post cardiac catheterization which shows 3 vessel coronary artery disease.  Patient now presents to clinic for discussion regarding coronary artery bypass grafting.        Review of patient's allergies indicates:   Allergen Reactions    Morphine Itching       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    Late complications of amputation stump     rseolved with further amputation( MRSA then none since 2014)    Lipoma of colon     colonoscopy 2/21/2014    Myocardial infarction     per patient 2000 & 9/2012    Peripheral vascular disease     Phantom limb syndrome     patient reports only intermittent not problematic, not worsening    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    Spinal cord disease     L4L5 disc    Stroke     Tobacco dependence     resolved    Ureteral stent retained        Family History   Problem Relation Age of Onset    Cancer Mother         lung    COPD Mother     Heart disease Father          MI but per patient bc of old age    Diabetes Daughter     Eczema Neg Hx     Lupus Neg Hx     Psoriasis Neg Hx     Melanoma Neg Hx     Kidney disease Neg Hx     Stroke Neg Hx     Mental illness Neg Hx     Mental retardation Neg Hx     Hypertension Neg Hx     Hyperlipidemia Neg Hx     Drug abuse Neg Hx     Alcohol abuse Neg Hx     Depression Neg Hx        Social History     Socioeconomic History    Marital status:      Spouse name: Laury    Number of children: 2    Years of education: Not on file    Highest education level: Not on file   Social Needs    Financial resource strain: Not on file    Food insecurity - worry: Not on file    Food insecurity - inability: Not on file    Transportation needs - medical: Not on file    Transportation needs - non-medical: Not on file   Occupational History    Occupation: Retired      Comment: [employer]   Tobacco Use    Smoking status: Former Smoker     Packs/day: 1.00     Years: 15.00     Pack years: 15.00     Last attempt to quit: 1/1/2009     Years since quitting: 10.2    Smokeless tobacco: Never Used   Substance and Sexual Activity    Alcohol use: No    Drug use: No     Comment: Is on prescription opiod, no non prescribed use    Sexual activity: Not Currently     Partners: Female   Other Topics Concern    Not on file   Social History Narrative     . 4 children alive and well. 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  Still drives. Does not have a Living Will.       Past Surgical History:   Procedure Laterality Date    ABDOMINAL AORTIC ANEURYSM REPAIR      ABDOMINAL AORTIC ANEURYSM REPAIR  1996/2014    AMPUTATION, LOWER LIMB      AORTA - BILATERAL FEMORAL ARTERY BYPASS GRAFT  2014    Left and right leg    CATHETERIZATION, HEART, LEFT Left 3/7/2019    [facility]ATH LAB    COLONOSCOPY N/A 2/21/2014    [facility]formed by [facility]anner [facility]  at [facility]    CORONARY ANGIOPLASTY WITH STENT PLACEMENT  2000    Three placed in heart    CREATION, BYPASS, ARTERIAL, AORTA TO FEMORAL, BILATERAL Right 3/16/2014    [facility]    CYSTOSCOPY WITH STENT EXCHANGE/RETROGRADE PYELOGRAM Left 4/2/2015    [facility] Scooter Jin [facility]    CYSTOSCOPY WITH STENT PLACEMENT Left 5/4/2017    [facility] Sc[facility]    CYSTOSCOPY WITH STENT PLACEMENT Left 4/28/2016    [facility]    CYSTOSCOPY, WITH RETROGRADE PYELOGRAM Left 5/29/2018    [facility] Sc[facility]    CYSTOSCOPY, WITH URETERAL STENT INSERTION Left 5/29/2018    [facility] Scooter [facility]    CYSTOSCOPY, WITH URETERAL STENT INSERTION Left 1/23/2014    [facility] Sc[facility]    CYSTOSCOPY, WITH URETERAL STENT REMOVAL Left 5/29/2018    [facility] Sc[facility]    EGD (ESOPHAGOGASTRODUODENOSCOPY) N/A 2/21/2014    Performed by [facility]    ENDARTERECTOMY-CAROTID Left 8/18/2017    [facility]    FOOT AMPUTATION THROUGH METATARSAL  1996    left    FOOT SURGERY Bilateral 1980's    per patient multiple toe amputations prior to.  partial foot amputation:first great toe then other toes     KIDNEY SURGERY  2014    per patient separation of horseshoe kidney @ time of AAA repair    LUNG LOBECTOMY Right 1970s    per patient not cancer    PYELOGRAM-RETROGRADE Left 5/4/2017    [facility]    RESECTION-BOWEL N/A 3/16/2014    [facility]    right below knee amputation  2009 (approx)    SMALL INTESTINE SURGERY  2014    per patient partial @ time of aaa repair  not small bowel - large bowel bowel compromised bythtwe AAAbowel    TONSILLECTOMY  1955 aprox    URETERAL STENT PLACEMENT  Left     annually replaced since 2012 or so  Dr Jin       Review of Systems       Objective:   /60   Pulse 72   Temp 97.7 °F (36.5 °C) (Oral)   Wt 88.3 kg (194 lb 10.7 oz)   BMI 25.68 kg/m²     IR Heart Cath Images  Narrative: EXAMINATION:  IR HEART CATH IMAGES    CLINICAL HISTORY:  [facility];    COMPARISON:  None    FINDINGS:  Please see physician notes for report.  Impression: Please see physician notes for report.    Electronically signed by: Virtual Radiologist  Date:    03/08/2019  Time:    10:25         Physical Exam   Constitutional: He is oriented to person, place, and time. He appears well-developed and well-nourished. No distress.   HENT:   Head: Normocephalic and atraumatic.   Cardiovascular: Normal rate, regular rhythm and normal heart sounds.   No murmur heard.  Pulmonary/Chest: Effort normal and breath sounds normal.   Abdominal: Soft. Bowel sounds are normal.   Musculoskeletal: Normal range of motion. He exhibits no edema.   The patient is status post right BKA.  Sulcal status post left midfoot amputation.   Neurological: He is alert and oriented to person, place, and time.   Skin: Skin is warm and dry. He is not diaphoretic.   Psychiatric: He has a normal mood and affect.       Assessment:     1. Unstable angina     The patient is a 69-year-old male with coronary artery disease status post MI and status post stenting, abdominal aortic aneurysm status post resection, status post graft infection, MRSA infection, urosepsis status post axillo-femoral bypass, chronic renal insufficiency, status post right BKA, carotid disease status post left CEA, peripheral vascular disease and CVA who was worked up for complains of retrosternal and epigastric pain. Cardiac catheterization shows three-vessel coronary artery disease.  The patient is high risk for coronary artery bypass grafting.  The case was discussed with Dr. Boone of Cardiology.  And consensus was that the patient should be referred to the  [facility] for possible high risk PCI.          Plan   Referred for possible PCI.          Bunny Bauman, [facility]

## 2019-03-13 NOTE — TELEPHONE ENCOUNTER
Kimberley,    Please call pt and advise him that I would like for him to see Dr. Brennen Ibarra, CV surgery, at [facility] for consideration of CABG asap since he is at higher risk for CABG complications.  Please schedule consult asap.    Thanks    Dr Boone

## 2019-03-13 NOTE — TELEPHONE ENCOUNTER
Telephoned Mrs. Ribeiro to advise of need to discuss high risk CABG with Dr. Ibarra in [place]- expressed traveling to [place] not a good option and will proceed with appointment today with   Dr. Bauman.  Message was left for return call from Dr. Ibarra' Staff also for an appointment.

## 2019-03-13 NOTE — PROGRESS NOTES
Subjective:   Patient ID:  Kodi Ribeiro is a 69 y.o. male who presents for evaluation of Hypertension and Coronary Artery Disease      HPI     Mr. Ribeiro is a 69 year old male who presents to clinic for hospital follow up.   His current medical problems include CAD, PAD, AAA, CRI, Carotid disease s/p L CEA (2017), RLE BKA.   He underwent LHC per Dr. Boone last week and was referred for CABG and was discharged home.  He returns today and states he is doing ok.   Denies chest pain or anginal equivalents. He does endorse shortness of breath with physical exertion.   He reports that he has been resting and mainly sitting around since last week and has felt good.   NO shortness of breath, MACKEY or palpitations today.   Denies orthopnea, PND or abdominal bloating.   No falls, syncope, or near syncopal events.   Reports compliance with medications and diet.   No CNS complaints to suggest TIA or CVA today.   No signs of abnormal bleeding on ASA.     Appointment with CT surgery later this AM.           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    Late complications of amputation stump     rseolved with further amputation( MRSA then none since 2014)    Lipoma of colon     colonoscopy 2/21/2014    Myocardial infarction     per patient 2000 & 9/2012    Peripheral vascular disease     Phantom limb syndrome     patient reports only intermittent not problematic, not worsening    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    Spinal cord disease     L4L5 disc    Stroke     Tobacco dependence      resolved    Ureteral stent retained        Past Surgical History:   Procedure Laterality Date    ABDOMINAL AORTIC ANEURYSM REPAIR      ABDOMINAL AORTIC ANEURYSM REPAIR  1996/2014    AMPUTATION, LOWER LIMB      AORTA - BILATERAL FEMORAL ARTERY BYPASS GRAFT  2014    Left and right leg    CATHETERIZATION, HEART, LEFT Left 3/7/2019    Performed by Adriel [facility]one MD at [facility] CATH LAB    COLONOSCOPY N/A 2/21/2014    Performed by Isaac Tanner MD at [facility] ENDO    CORONARY ANGIOPLASTY WITH STENT PLACEMENT  2000    Three placed in heart    CREATION, BYPASS, ARTERIAL, AORTA TO FEMORAL, BILATERAL Right 3/16/2014    Performed by Stefan Jamil MD at [facility] OR    CYSTOSCOPY WITH STENT EXCHANGE/RETROGRADE PYELOGRAM Left 4/2/2015    Performed by Scooter Jin IV, MD at [facility] OR    CYSTOSCOPY WITH STENT PLACEMENT Left 5/4/2017    Performed by Scooter Jin IV, MD at [facility] OR    CYSTOSCOPY WITH STENT PLACEMENT Left 4/28/2016    Performed by Scooter Jin IV, MD at [facility] OR    CYSTOSCOPY, WITH RETROGRADE PYELOGRAM Left 5/29/2018    Performed by Scooter Jin IV, MD at [facility] OR    CYSTOSCOPY, WITH URETERAL STENT INSERTION Left 5/29/2018    Performed by Scooter Jin IV, MD at [facility] OR    CYSTOSCOPY, WITH URETERAL STENT INSERTION Left 1/23/2014    Performed by Scooter Jin IV, MD at [facility] OR    CYSTOSCOPY, WITH URETERAL STENT REMOVAL Left 5/29/2018    Performed by Scooter Jin IV, MD at [facility] OR    EGD (ESOPHAGOGASTRODUODENOSCOPY) N/A 2/21/2014    Performed by Isaac Tanner MD at [facility] ENDO    ENDARTERECTOMY-CAROTID Left 8/18/2017    Performed by Stefan Jamil MD at [facility] OR    FOOT AMPUTATION THROUGH METATARSAL  1996    left    FOOT SURGERY Bilateral 1980's    per patient multiple toe amputations prior to.  partial foot amputation:first great toe then other toes     KIDNEY SURGERY  2014    per patient separation of horseshoe kidney @ time of AAA repair    LUNG LOBECTOMY  Right 1970s    per patient not cancer    PYELOGRAM-RETROGRADE Left 5/4/2017    Performed by Scooter Jin IV, [facility] at [facility] OR    RESECTION-BOWEL N/A 3/16/2014    Performed by Stefan Jamil MD at [facility] OR    right below knee amputation  2009 (approx)    SMALL INTESTINE SURGERY  2014    per patient partial @ time of aaa repair  not small bowel - large bowel bowel compromised bythtwe AAAbowel    TONSILLECTOMY  1955 aprox    URETERAL STENT PLACEMENT Left     annually replaced since 2012 or so  Dr Jin       Social History     Tobacco Use    Smoking status: Former Smoker     Packs/day: 1.00     Years: 15.00     Pack years: 15.00     Last attempt to quit: 1/1/2009     Years since quitting: 10.2    Smokeless tobacco: Never Used   Substance Use Topics    Alcohol use: No    Drug use: No     Comment: Is on prescription opiod, no non prescribed use       Family History   Problem Relation Age of Onset    Cancer Mother         lung    COPD Mother     Heart disease Father         MI but per patient bc of old age    Diabetes Daughter     Eczema Neg Hx     Lupus Neg Hx     Psoriasis Neg Hx     Melanoma Neg Hx     Kidney disease Neg Hx     Stroke Neg Hx     Mental illness Neg Hx     Mental retardation Neg Hx     Hypertension Neg Hx     Hyperlipidemia Neg Hx     Drug abuse Neg Hx     Alcohol abuse Neg Hx     Depression Neg Hx      Wt Readings from Last 3 Encounters:   03/13/19 90.3 kg (199 lb)   03/07/19 90.3 kg (199 lb)   03/05/19 90.4 kg (199 lb 4.7 oz)     Temp Readings from Last 3 Encounters:   03/07/19 97.8 °F (36.6 °C) (Oral)   03/05/19 97.2 °F (36.2 °C)   02/25/19 98.3 °F (36.8 °C) (Oral)     BP Readings from Last 3 Encounters:   03/13/19 (!) 142/74   03/07/19 (!) 150/61   03/05/19 (!) 141/70     Pulse Readings from Last 3 Encounters:   03/13/19 66   03/07/19 73   03/05/19 74     Current Outpatient Medications on File Prior to Visit   Medication Sig Dispense Refill    amLODIPine (NORVASC)  10 MG tablet TAKE 1 TABLET EVERY DAY 90 tablet 3    aspirin (ECOTRIN) 81 MG EC tablet Take 1 tablet (81 mg total) by mouth once daily.  0    carvedilol (COREG) 12.5 MG tablet Take 1 tablet (12.5 mg total) by mouth 2 (two) times daily with meals. 180 tablet 3    clopidogrel (PLAVIX) 75 mg tablet TAKE 1 TABLET EVERY DAY 90 tablet 3    docusate sodium (COLACE) 100 MG capsule Take 1 capsule (100 mg total) by mouth 2 (two) times daily. 60 capsule 0    ergocalciferol (ERGOCALCIFEROL) 50,000 unit Cap Take 1 capsule (50,000 Units total) by mouth every 7 days. 12 capsule 5    isosorbide mononitrate (IMDUR) 60 MG 24 hr tablet Take 1 tablet (60 mg total) by mouth once daily. 30 tablet 11    losartan (COZAAR) 100 MG tablet Take 1 tablet (100 mg total) by mouth once daily. 90 tablet 3    nitroglycerin (NITROSTAT) 0.6 MG Subl Place 1 tablet (0.6 mg total) under the tongue every 5 (five) minutes as needed (max 3/ per episode). 30 tablet 1    oxyCODONE-acetaminophen (PERCOCET) 5-325 mg per tablet Take 1-2 tablets by mouth every 4 (four) hours as needed for Pain. 30 tablet 0    pantoprazole (PROTONIX) 40 MG tablet Take 1 tablet (40 mg total) by mouth once daily. 30 tablet 11    pravastatin (PRAVACHOL) 80 MG tablet TAKE 1 TABLET EVERY EVENING 90 tablet 3     No current facility-administered medications on file prior to visit.        Review of Systems   Constitution: Negative for weakness and malaise/fatigue.   HENT: Negative for hearing loss and hoarse voice.    Eyes: Negative for blurred vision and visual disturbance.   Cardiovascular: Positive for dyspnea on exertion. Negative for chest pain, claudication, irregular heartbeat, leg swelling, near-syncope, orthopnea, palpitations, paroxysmal nocturnal dyspnea and syncope.   Respiratory: Negative for cough, hemoptysis, shortness of breath, sleep disturbances due to breathing, snoring and wheezing.    Endocrine: Negative for cold intolerance and heat intolerance.    Hematologic/Lymphatic: Does not bruise/bleed easily.   Skin: Negative for color change, dry skin and nail changes.   Musculoskeletal: Positive for arthritis. Negative for back pain, joint pain and myalgias.        S/p RLE BKA   Gastrointestinal: Negative for bloating, abdominal pain, constipation, nausea and vomiting.   Genitourinary: Negative for dysuria, flank pain, hematuria and hesitancy.   Neurological: Negative for headaches, light-headedness, loss of balance, numbness and paresthesias.   Psychiatric/Behavioral: Negative for altered mental status.   Allergic/Immunologic: Negative for environmental allergies.       Objective:   Physical Exam   Constitutional: He is oriented to person, place, and time. He appears well-developed and well-nourished. No distress.   HENT:   Head: Normocephalic and atraumatic.   Eyes: Pupils are equal, round, and reactive to light.   Neck: Normal range of motion and full passive range of motion without pain. Neck supple. No JVD present.   Cardiovascular: Normal rate, regular rhythm, S1 normal, S2 normal and intact distal pulses. PMI is not displaced. Exam reveals no distant heart sounds.   No murmur heard.  Pulses:       Radial pulses are 2+ on the right side, and 2+ on the left side.        Dorsalis pedis pulses are 2+ on the right side, and 2+ on the left side.   Left radial access site C/D/I, no hematoma or ecchymosis noted. No drainage, erythema or signs of infection.    Left radial pulse +2.   Pulmonary/Chest: Effort normal and breath sounds normal. No accessory muscle usage. No respiratory distress. He has no decreased breath sounds. He has no wheezes. He has no rales.   Abdominal: Soft. Bowel sounds are normal. He exhibits no distension. There is no tenderness.   Musculoskeletal: Normal range of motion. He exhibits no edema.        Left ankle: He exhibits no swelling.   RLE BKA   Neurological: He is alert and oriented to person, place, and time.   Skin: Skin is warm and dry.  He is not diaphoretic. No cyanosis. Nails show no clubbing.   Psychiatric: He has a normal mood and affect. His speech is normal and behavior is normal. Judgment and thought content normal. Cognition and memory are normal.   Nursing note and vitals reviewed.      Lab Results   Component Value Date    CHOL 137 10/23/2017    CHOL 122 08/15/2017    CHOL 141 12/29/2016     Lab Results   Component Value Date    HDL 37 (L) 10/23/2017    HDL 34 (L) 08/15/2017    HDL 29 (L) 12/29/2016     Lab Results   Component Value Date    LDLCALC 73.8 10/23/2017    LDLCALC 62.8 (L) 08/15/2017    LDLCALC 69.2 12/29/2016     Lab Results   Component Value Date    TRIG 131 10/23/2017    TRIG 126 08/15/2017    TRIG 214 (H) 12/29/2016     Lab Results   Component Value Date    CHOLHDL 27.0 10/23/2017    CHOLHDL 27.9 08/15/2017    CHOLHDL 20.6 12/29/2016       Chemistry        Component Value Date/Time     03/05/2019 1052    K 4.9 03/05/2019 1052     (H) 03/05/2019 1052    CO2 19 (L) 03/05/2019 1052    BUN 33 (H) 03/05/2019 1052    CREATININE 2.4 (H) 03/05/2019 1052    GLU 94 03/05/2019 1052        Component Value Date/Time    CALCIUM 8.7 03/05/2019 1052    ALKPHOS 162 (H) 03/05/2019 1052    AST 11 03/05/2019 1052    ALT 8 (L) 03/05/2019 1052    BILITOT 0.5 03/05/2019 1052    ESTGFRAFRICA 30 (A) 03/05/2019 1052    EGFRNONAA 26 (A) 03/05/2019 1052          Lab Results   Component Value Date    TSH 1.024 10/30/2012     Lab Results   Component Value Date    INR 1.0 02/26/2019    INR 1.0 01/23/2019    INR 1.1 08/11/2017     Lab Results   Component Value Date    WBC 6.86 03/05/2019    HGB 9.6 (L) 03/05/2019    HCT 30.4 (L) 03/05/2019    MCV 92 03/05/2019     03/05/2019      Angiographic Results     Diagnostic:          Patient has a right dominant coronary artery.        - Left Main Coronary Artery:             The LM. There is ADRIAN 3 flow. Left main appears to have diffuse calcified disease estimated at 30%.     - Left Anterior  Descending Artery:             The LAD. There is ADRIAN 3 flow. LAD is heavily calcified in proximal to mid segment with diffuse disease.  There is 75% stenosis just after takeoff of D1 extending into a stent in mid segment of LAD.  The distal LAD appears to have   nonobstructive disease.       - D1:             The D1. There is ADRIAN 3 flow. Moderate calcified disease noted in ostium/proximal segment.      - Left Circumflex Artery:             The LCX. There is ADRIAN 3 flow. LCX has nonobstructive disease, 40% in proximal segment.     - OM1:             The OM1. There is ADRIAN 3 flow. OM1 has diffuse disease.  There is patent stent in proximal segment.  Beyond stent there is 70% diffuse disease in mid to distal segment and 80% sub-branch stenosis.      - Right Coronary Artery:             The ostial RCA has a 50% stenosis. There is ADRIAN 3 flow.                     Lesion Details:  Heavy calcification.             The mid RCA has a 70% stenosis. There is ADRIAN 3 flow.                     Lesion Details:  Heavy calcification.             The distal RCA has a 70% stenosis. There is ADRIAN 3 flow. The remaining portion of the vessel is diffusely diseased (75).                     Lesion Details:  Heavy calcification.     - Left Subclavian Artery:             The LSUBCL. There is nonobstructive disease in proximal subclavian artery 20 - 30%.  There is 50% ostial left vertebral stenosis.  Assessment:      1. Essential hypertension    2. CAD;Old MI ; s/p stents x 3 (2000)     3. AP (angina pectoris)    4. Nonrheumatic aortic valve stenosis    5. Left carotid artery stenosis    6. PVD (peripheral vascular disease)    7. CKD (chronic kidney disease) stage 3, GFR 30-59 ml/min    8. 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    9. History of CVA (cerebrovascular accident)    10. Cerebral infarction due to embolism of left middle cerebral artery      Patient presents to clinic for hospital follow up and is doing ok  today.  He has been resting and light duty activities since [facility] without any further MACKEY issues today.   Denies chest pain on exam today.  appt with Dr. Bauman later this AM for further treatment decisions.   Reports compliance with medications and diet.   Plan:     Continue same CV meds for now  Further decisions after appt with CT surgery this AM  Dash diet, 2gm sodium restriction  1.5L Fluid intake per day  Avoid over-exertion for now  Follow up with Dr. Boone TBD after CT surgery appt.     NICOLE Ledbetter

## 2019-03-13 NOTE — TELEPHONE ENCOUNTER
I have tried to call patient regarding appointment with Dr. Ibarra. Unable to leave voicemail, will try again.     Left message on home phone for emergency contact to call clinic to discuss appointment.     NICOLE Ledbetter

## 2019-03-13 NOTE — PROGRESS NOTES
I have attempted to reach patient regarding appointment with Dr. Ibarra in [place] but no answer. Voicemail full, unable to leave message.     Gris Fine, [id number]-C

## 2019-03-15 RX ORDER — PRAVASTATIN SODIUM 80 MG/1
80 TABLET ORAL NIGHTLY
Qty: 90 TABLET | Refills: 3 | Status: SHIPPED | OUTPATIENT
Start: 2019-03-15 | End: 2019-09-17

## 2019-03-15 NOTE — TELEPHONE ENCOUNTER
----- Message from Lala Beckman sent at 3/15/2019 10:05 AM CDT -----  Contact: Self   Requesting a call back to discuss appointment with Dr. Forde. Please call back at [phone].        Thanks,  Lala Beckman

## 2019-03-15 NOTE — TELEPHONE ENCOUNTER
Scheduled for 3/27/19 but really would like earlier date. Dr. Ibarra Staff aware and tried to reassure Mrs. Ribeiro    Returned call to Mrs. Ribeiro and left additional message for NO Staff hopefully today will get appointment date and time

## 2019-03-20 ENCOUNTER — LAB VISIT (OUTPATIENT)
Dept: LAB | Facility: HOSPITAL | Age: 69
End: 2019-03-20
Attending: INTERNAL MEDICINE
Payer: MEDICARE

## 2019-03-20 DIAGNOSIS — I10 ESSENTIAL HYPERTENSION: ICD-10-CM

## 2019-03-20 DIAGNOSIS — Q63.1 HORSESHOE KIDNEY: ICD-10-CM

## 2019-03-20 DIAGNOSIS — E55.9 VITAMIN D DEFICIENCY: ICD-10-CM

## 2019-03-20 DIAGNOSIS — N13.5 URETERAL STRICTURE, LEFT: ICD-10-CM

## 2019-03-20 DIAGNOSIS — N18.4 ANEMIA OF CHRONIC RENAL FAILURE, STAGE 4 (SEVERE): ICD-10-CM

## 2019-03-20 DIAGNOSIS — I73.9 PVD (PERIPHERAL VASCULAR DISEASE): ICD-10-CM

## 2019-03-20 DIAGNOSIS — R80.1 PERSISTENT PROTEINURIA: ICD-10-CM

## 2019-03-20 DIAGNOSIS — N18.4 CKD STAGE G4/A3, GFR 15-29 AND ALBUMIN CREATININE RATIO >300 MG/G: ICD-10-CM

## 2019-03-20 DIAGNOSIS — D63.1 ANEMIA OF CHRONIC RENAL FAILURE, STAGE 4 (SEVERE): ICD-10-CM

## 2019-03-20 DIAGNOSIS — N25.81 HYPERPARATHYROIDISM, SECONDARY RENAL: ICD-10-CM

## 2019-03-20 LAB
ALBUMIN SERPL BCP-MCNC: 3.2 G/DL
ANION GAP SERPL CALC-SCNC: 8 MMOL/L
BASOPHILS # BLD AUTO: 0.04 K/UL
BASOPHILS NFR BLD: 0.8 %
BUN SERPL-MCNC: 31 MG/DL
CALCIUM SERPL-MCNC: 8.4 MG/DL
CHLORIDE SERPL-SCNC: 113 MMOL/L
CO2 SERPL-SCNC: 16 MMOL/L
CREAT SERPL-MCNC: 2.3 MG/DL
DIFFERENTIAL METHOD: ABNORMAL
EOSINOPHIL # BLD AUTO: 0.3 K/UL
EOSINOPHIL NFR BLD: 5.6 %
ERYTHROCYTE [DISTWIDTH] IN BLOOD BY AUTOMATED COUNT: 14.2 %
EST. GFR  (AFRICAN AMERICAN): 32.1 ML/MIN/1.73 M^2
EST. GFR  (NON AFRICAN AMERICAN): 27.7 ML/MIN/1.73 M^2
GLUCOSE SERPL-MCNC: 112 MG/DL
HCT VFR BLD AUTO: 28.6 %
HGB BLD-MCNC: 9.2 G/DL
IMM GRANULOCYTES # BLD AUTO: 0.02 K/UL
IMM GRANULOCYTES NFR BLD AUTO: 0.4 %
LYMPHOCYTES # BLD AUTO: 0.8 K/UL
LYMPHOCYTES NFR BLD: 16.6 %
MCH RBC QN AUTO: 29.4 PG
MCHC RBC AUTO-ENTMCNC: 32.2 G/DL
MCV RBC AUTO: 91 FL
MONOCYTES # BLD AUTO: 0.4 K/UL
MONOCYTES NFR BLD: 8.9 %
NEUTROPHILS # BLD AUTO: 3.3 K/UL
NEUTROPHILS NFR BLD: 67.7 %
NRBC BLD-RTO: 0 /100 WBC
PHOSPHATE SERPL-MCNC: 2.7 MG/DL
PLATELET # BLD AUTO: 220 K/UL
PMV BLD AUTO: 10.5 FL
POTASSIUM SERPL-SCNC: 4.3 MMOL/L
PTH-INTACT SERPL-MCNC: 472 PG/ML
RBC # BLD AUTO: 3.13 M/UL
SODIUM SERPL-SCNC: 137 MMOL/L
WBC # BLD AUTO: 4.82 K/UL

## 2019-03-20 PROCEDURE — 36415 COLL VENOUS BLD VENIPUNCTURE: CPT | Mod: HCNC,PO

## 2019-03-20 PROCEDURE — 80069 RENAL FUNCTION PANEL: CPT | Mod: HCNC

## 2019-03-20 PROCEDURE — 85025 COMPLETE CBC W/AUTO DIFF WBC: CPT | Mod: HCNC

## 2019-03-20 PROCEDURE — 83970 ASSAY OF PARATHORMONE: CPT | Mod: HCNC

## 2019-03-24 ENCOUNTER — HOSPITAL ENCOUNTER (EMERGENCY)
Facility: HOSPITAL | Age: 69
Discharge: HOME | End: 2019-03-25
Attending: EMERGENCY MEDICINE
Payer: MEDICARE

## 2019-03-24 VITALS
SYSTOLIC BLOOD PRESSURE: 150 MMHG | TEMPERATURE: 98 F | HEIGHT: 73 IN | HEART RATE: 72 BPM | WEIGHT: 198.63 LBS | OXYGEN SATURATION: 97 % | RESPIRATION RATE: 20 BRPM | DIASTOLIC BLOOD PRESSURE: 77 MMHG | BODY MASS INDEX: 26.33 KG/M2

## 2019-03-24 DIAGNOSIS — M54.2 NECK PAIN: Primary | ICD-10-CM

## 2019-03-24 PROCEDURE — 96372 THER/PROPH/DIAG INJ SC/IM: CPT | Mod: HCNC

## 2019-03-24 PROCEDURE — 25000003 PHARM REV CODE 250: Mod: HCNC | Performed by: EMERGENCY MEDICINE

## 2019-03-24 PROCEDURE — 63600175 PHARM REV CODE 636 W HCPCS: Mod: HCNC | Performed by: EMERGENCY MEDICINE

## 2019-03-24 PROCEDURE — 99284 EMERGENCY DEPT VISIT MOD MDM: CPT | Mod: 25,HCNC

## 2019-03-24 RX ORDER — CYCLOBENZAPRINE HCL 10 MG
10 TABLET ORAL
Status: COMPLETED | OUTPATIENT
Start: 2019-03-24 | End: 2019-03-24

## 2019-03-24 RX ORDER — ACETAMINOPHEN 500 MG
1000 TABLET ORAL
Status: COMPLETED | OUTPATIENT
Start: 2019-03-24 | End: 2019-03-24

## 2019-03-24 RX ORDER — HYDROMORPHONE HYDROCHLORIDE 2 MG/ML
1 INJECTION, SOLUTION INTRAMUSCULAR; INTRAVENOUS; SUBCUTANEOUS
Status: COMPLETED | OUTPATIENT
Start: 2019-03-24 | End: 2019-03-24

## 2019-03-24 RX ORDER — LIDOCAINE 50 MG/G
1 PATCH TOPICAL
Status: DISCONTINUED | OUTPATIENT
Start: 2019-03-24 | End: 2019-03-25 | Stop reason: HOSPADM

## 2019-03-24 RX ADMIN — CYCLOBENZAPRINE HYDROCHLORIDE 10 MG: 10 TABLET, FILM COATED ORAL at 10:03

## 2019-03-24 RX ADMIN — HYDROMORPHONE HYDROCHLORIDE 1 MG: 2 INJECTION, SOLUTION INTRAMUSCULAR; INTRAVENOUS; SUBCUTANEOUS at 11:03

## 2019-03-24 RX ADMIN — ACETAMINOPHEN 1000 MG: 500 TABLET ORAL at 10:03

## 2019-03-24 RX ADMIN — LIDOCAINE 1 PATCH: 50 PATCH TOPICAL at 10:03

## 2019-03-25 RX ORDER — OXYCODONE AND ACETAMINOPHEN 5; 325 MG/1; MG/1
1 TABLET ORAL EVERY 6 HOURS PRN
Qty: 10 TABLET | Refills: 0 | Status: ON HOLD | OUTPATIENT
Start: 2019-03-25 | End: 2019-04-03 | Stop reason: SDUPTHER

## 2019-03-25 RX ORDER — CYCLOBENZAPRINE HCL 10 MG
5 TABLET ORAL 3 TIMES DAILY PRN
Qty: 15 TABLET | Refills: 0 | Status: ON HOLD | OUTPATIENT
Start: 2019-03-25 | End: 2019-04-03 | Stop reason: HOSPADM

## 2019-03-25 NOTE — ED PROVIDER NOTES
"SCRIBE #1 NOTE: I, Maria Teresa Armstrong, am scribing for, and in the presence of, Pantera Wu MD. I have scribed the entire note.       History     Chief Complaint   Patient presents with    Neck Pain     states woke up with crick in neck with pain to right neck 3 days ago.  States has gotten progressively worse with pain and stiffness.     Review of patient's allergies indicates:   Allergen Reactions    Morphine Itching         History of Present Illness     HPI    3/24/2019, 9:45 PM  History obtained from the patient      History of Present Illness: Kodi Ribeiro is a 69 y.o. male patient with a PMHx of anemia, GERD, hyperglycemia, HTN, HLD, who presents to the Emergency Department for evaluation of neck pain which onset gradually 3 days ago. Pt states this is "worse than a crick." Symptoms are worsening and moderate in severity.  No mitigating or exacerbating factors reported. Associated sxs include neck stiffness. Patient denies any HA, back pain, CP, extremity numbness/weakness, N/V, and all other sxs at this time. Prior Tx includes Biofreeze and Salonpas with minimal relief. No further complaints or concerns at this time.     Arrival mode: Personal vehicle    PCP: Norma Nuñez MD      Past Medical History:  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    Late complications of amputation stump     rseolved with further amputation( MRSA then none since 2014)    Lipoma of colon     colonoscopy 2/21/2014    Myocardial infarction     per patient 2000 & 9/2012    Peripheral " vascular disease     Phantom limb syndrome     patient reports only intermittent not problematic, not worsening    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    Spinal cord disease     L4L5 disc    Stroke     Tobacco dependence     resolved    Ureteral stent retained        Past Surgical History:  Past Surgical History:   Procedure Laterality Date    ABDOMINAL AORTIC ANEURYSM REPAIR      ABDOMINAL AORTIC ANEURYSM REPAIR  1996/2014    AMPUTATION, LOWER LIMB      AORTA - BILATERAL FEMORAL ARTERY BYPASS GRAFT  2014    Left and right leg    CATHETERIZATION, HEART, LEFT Left 3/7/2019    [facility] by [facility] at [facility] CATH LAB    COLONOSCOPY N/A 2/21/2014    [facility] by [facility] Tanner [facility] at [facility] ENDO    CORONARY ANGIOPLASTY WITH STENT PLACEMENT  2000    Three placed in heart    CREATION, BYPASS, ARTERIAL, AORTA TO FEMORAL, BILATERAL Right 3/16/2014    [facility] by Stefan [facility]il MD at [facility] OR    CYSTOSCOPY WITH STENT EXCHANGE/RETROGRADE PYELOGRAM Left 4/2/2015    Performed by Scooter Jin IV, MD at [facility] OR    CYSTOSCOPY WITH STENT PLACEMENT Left 5/4/2017    [facility] by Scooter Jin IV, [facility] at [facility] OR    CYSTOSCOPY WITH STENT PLACEMENT Left 4/28/2016    [facility] by Scooter [facility] IV, [facility] at [facility] OR    CYSTOSCOPY, WITH RETROGRADE PYELOGRAM Left 5/29/2018    [facility] by Sc[facility] IV, [facility] at [facility] OR    CYSTOSCOPY, WITH URETERAL STENT INSERTION Left 5/29/2018    [facility] by Scooter [facility] IV, [facility] at [facility] OR    CYSTOSCOPY, WITH URETERAL STENT INSERTION Left 1/23/2014    [facility] by Sc[facility] IV, [facility] at [facility] OR    CYSTOSCOPY, WITH URETERAL STENT REMOVAL Left 5/29/2018    [facility] by Sc[facility] IV, [facility] at [facility] OR    EGD (ESOPHAGOGASTRODUODENOSCOPY) N/A 2/21/2014    Performed by Isaac Tanner MD at [facility] ENDO    ENDARTERECTOMY-CAROTID Left 8/18/2017    [facility] by [facility] at [facility] OR    FOOT AMPUTATION THROUGH METATARSAL  1996     left    FOOT SURGERY Bilateral 1980's    per patient multiple toe amputations prior to.  partial foot amputation:first great toe then other toes     KIDNEY SURGERY  2014    per patient separation of horseshoe kidney @ time of AAA repair    LUNG LOBECTOMY Right 1970s    per patient not cancer    PYELOGRAM-RETROGRADE Left 5/4/2017    [facility]    RESECTION-BOWEL N/A 3/16/2014    [facility] by [facility] at [facility] OR    right below knee amputation  2009 (approx)    SMALL INTESTINE SURGERY  2014    per patient partial @ time of aaa repair  not small bowel - large bowel bowel compromised bythtwe AAAbowel    TONSILLECTOMY  1955 aprox    URETERAL STENT PLACEMENT Left     annually replaced since 2012 or so  Dr Jin         Family History:  Family History   Problem Relation Age of Onset    Cancer Mother         lung    COPD Mother     Heart disease Father         MI but per patient bc of old age    Diabetes Daughter     Eczema Neg Hx     Lupus Neg Hx     Psoriasis Neg Hx     Melanoma Neg Hx     Kidney disease Neg Hx     Stroke Neg Hx     Mental illness Neg Hx     Mental retardation Neg Hx     Hypertension Neg Hx     Hyperlipidemia Neg Hx     Drug abuse Neg Hx     Alcohol abuse Neg Hx     Depression Neg Hx        Social History:  Social History     Tobacco Use    Smoking status: Former Smoker     Packs/day: 1.00     Years: 15.00     Pack years: 15.00     Last attempt to quit: 1/1/2009     Years since quitting: 10.2    Smokeless tobacco: Never Used   Substance and Sexual Activity    Alcohol use: No    Drug use: No     Comment: Is on prescription opiod, no non prescribed use    Sexual activity: Not Currently     Partners: Female        Review of Systems     Review of Systems   Constitutional: Negative for fever.   HENT: Negative for sore throat.    Respiratory: Negative for shortness of breath.    Cardiovascular: Negative for chest pain.    Gastrointestinal: Negative for nausea and vomiting.   Genitourinary: Negative for dysuria.   Musculoskeletal: Positive for neck pain and neck stiffness. Negative for back pain.   Skin: Negative for rash.   Neurological: Negative for weakness, numbness and headaches.   Hematological: Does not bruise/bleed easily.   All other systems reviewed and are negative.       Physical Exam     Initial Vitals [03/24/19 2141]   BP Pulse Resp Temp SpO2   (!) 148/71 76 (!) 22 98 °F (36.7 °C) 96 %      MAP       --          Physical Exam  Nursing Notes and Vital Signs Reviewed.  Constitutional: Patient is in no acute distress. Well-developed and well-nourished.  Head: Atraumatic. Normocephalic.  Eyes: PERRL. EOM intact. Conjunctivae are not pale. No scleral icterus.  ENT: Mucous membranes are moist. Oropharynx is clear and symmetric.    Neck: Supple. R trapezius tenderness and spasm.  Patient uncomfortable holding head straight and to the right. No cervical midline bony tenderness, deformities, or step-offs.   Cardiovascular: Regular rate. Regular rhythm. No murmurs, rubs, or gallops. Distal pulses are 2+ and symmetric.  Pulmonary/Chest: No respiratory distress. Clear to auscultation bilaterally. No wheezing or rales.  Abdominal: Soft and non-distended.  There is no tenderness.  No rebound, guarding, or rigidity. Good bowel sounds.  Genitourinary: No CVA tenderness  Musculoskeletal: Moves all extremities. No obvious deformities. No edema. No calf tenderness.  Skin: Warm and dry.  Neurological:  Alert, awake, and appropriate.  Normal speech.  No acute focal neurological deficits are appreciated.  Psychiatric: Normal affect. Good eye contact. Appropriate in content.     ED Course   Procedures  ED Vital Signs:  Vitals:    03/24/19 2141 03/24/19 2321 03/24/19 2340   BP: (!) 148/71 (!) 150/82 (!) 150/77   Pulse: 76 74 72   Resp: (!) 22 20 20   Temp: 98 °F (36.7 °C)     TempSrc: Oral     SpO2: 96% 96% 97%   Weight: 90.1 kg (198 lb 10.2  "oz)     Height: 6' 1" (1.854 m)              The Emergency Provider reviewed the vital signs and test results, which are outlined above.     ED Discussion     12:19 AM: Reassessed pt at this time.  Pt states his condition has improved at this time. Discussed with pt all pertinent ED information. Discussed pt dx and plan of tx. Gave pt all f/u and return to the ED instructions. All questions and concerns were addressed at this time. Pt expresses understanding of information and instructions, and is comfortable with plan to discharge. Pt is stable for discharge.    I discussed with patient and/or family/caretaker that evaluation in the ED does not suggest any emergent or life threatening medical conditions requiring immediate intervention beyond what was provided in the ED, and I believe patient is safe for discharge.  Regardless, an unremarkable evaluation in the ED does not preclude the development or presence of a serious of life threatening condition. As such, patient was instructed to return immediately for any worsening or change in current symptoms.      ED Medication(s):  Medications   lidocaine 5 % patch 1 patch (1 patch Transdermal Patch Applied 3/24/19 8431)   cyclobenzaprine tablet 10 mg (10 mg Oral Given 3/24/19 2231)   acetaminophen tablet 1,000 mg (1,000 mg Oral Given 3/24/19 2231)   hydromorphone (PF) injection 1 mg (1 mg Intramuscular Given 3/24/19 4187)       New Prescriptions    CYCLOBENZAPRINE (FLEXERIL) 10 MG TABLET    Take 0.5 tablets (5 mg total) by mouth 3 (three) times daily as needed for Muscle spasms.    OXYCODONE-ACETAMINOPHEN (PERCOCET) 5-325 MG PER TABLET    Take 1 tablet by mouth every 6 (six) hours as needed for Pain.                       Scribe Attestation:   Scribe #1: I performed the above scribed service and the documentation accurately describes the services I performed. I attest to the accuracy of the note.     Attending:   Physician Attestation Statement for Scribe #1: I, " Pantera Wu MD, personally performed the services described in this documentation, as scribed by Maria Teresa Armstrong, in my presence, and it is both accurate and complete.     Patient improved with lidocaine patch and analgesics; return precautions including return for fever, change in symptoms, neuro deficits, or failure of improvement.          Clinical Impression       ICD-10-CM ICD-9-CM   1. Neck pain M54.2 723.1       Disposition:   Disposition: Discharged  Condition: Stable         Pantera Wu MD  03/25/19 4570

## 2019-03-25 NOTE — ED NOTES
"Pt c/o neck pain. Patient states "I been having this neck pain for 3 days and it is only getting worse" Pain is 10/10 in back of neck.     Patient identifiers verified and correct for Kodi Ribeiro.    LOC: The patient is awake, alert and aware of environment with an appropriate affect, the patient is oriented x 3 and speaking appropriately.  APPEARANCE: Patient resting comfortably and in no acute distress, patient is clean and well groomed, patient's clothing is properly fastened.  SKIN: The skin is warm and dry, color consistent with ethnicity, patient has normal skin turgor and moist mucus membranes, skin intact, no breakdown or bruising noted.  MUSCULOSKELETAL: Patient moving all extremities spontaneously. (+) neck pain  RESPIRATORY: Airway is open and patent, respirations are spontaneous.  CARDIAC: Patient has a normal rate, no periphreal edema noted, capillary refill < 3 seconds.  ABDOMEN: Soft and non tender to palpation.  "

## 2019-03-26 ENCOUNTER — TELEPHONE (OUTPATIENT)
Dept: CARDIOLOGY | Facility: CLINIC | Age: 69
End: 2019-03-26

## 2019-03-27 ENCOUNTER — HOSPITAL ENCOUNTER (INPATIENT)
Facility: HOSPITAL | Age: 69
LOS: 8 days | Discharge: NURSING, REHAB OR LONG-TERM CARE | DRG: 097 | End: 2019-04-04
Attending: EMERGENCY MEDICINE | Admitting: FAMILY MEDICINE
Payer: MEDICARE

## 2019-03-27 DIAGNOSIS — N17.9 AKI (ACUTE KIDNEY INJURY): ICD-10-CM

## 2019-03-27 DIAGNOSIS — I10 ESSENTIAL HYPERTENSION: Chronic | ICD-10-CM

## 2019-03-27 DIAGNOSIS — N17.9 ACUTE RENAL FAILURE SUPERIMPOSED ON CHRONIC KIDNEY DISEASE, UNSPECIFIED CKD STAGE, UNSPECIFIED ACUTE RENAL FAILURE TYPE: Primary | ICD-10-CM

## 2019-03-27 DIAGNOSIS — G93.40 ACUTE ENCEPHALOPATHY: ICD-10-CM

## 2019-03-27 DIAGNOSIS — N14.0 ANALGESIC NEPHROPATHY: ICD-10-CM

## 2019-03-27 DIAGNOSIS — M54.2 NECK PAIN, BILATERAL: ICD-10-CM

## 2019-03-27 DIAGNOSIS — R41.82 ALTERED MENTAL STATUS: ICD-10-CM

## 2019-03-27 DIAGNOSIS — N18.9 ACUTE RENAL FAILURE SUPERIMPOSED ON CHRONIC KIDNEY DISEASE, UNSPECIFIED CKD STAGE, UNSPECIFIED ACUTE RENAL FAILURE TYPE: Primary | ICD-10-CM

## 2019-03-27 DIAGNOSIS — G03.9 MENINGITIS: ICD-10-CM

## 2019-03-27 DIAGNOSIS — I10 ACCELERATED HYPERTENSION: ICD-10-CM

## 2019-03-27 PROBLEM — M43.6 TORTICOLLIS: Status: ACTIVE | Noted: 2019-03-27

## 2019-03-27 PROBLEM — R53.81 DEBILITY: Status: ACTIVE | Noted: 2019-03-27

## 2019-03-27 PROBLEM — E83.42 HYPOMAGNESEMIA: Status: ACTIVE | Noted: 2019-03-27

## 2019-03-27 LAB
ALBUMIN SERPL BCP-MCNC: 2.8 G/DL (ref 3.5–5.2)
ALP SERPL-CCNC: 173 U/L (ref 55–135)
ALT SERPL W/O P-5'-P-CCNC: 12 U/L (ref 10–44)
ANION GAP SERPL CALC-SCNC: 13 MMOL/L (ref 8–16)
ANION GAP SERPL CALC-SCNC: 14 MMOL/L (ref 8–16)
APTT BLDCRRT: 34.7 SEC (ref 21–32)
AST SERPL-CCNC: 20 U/L (ref 10–40)
BACTERIA #/AREA URNS HPF: NORMAL /HPF
BASOPHILS # BLD AUTO: 0.01 K/UL (ref 0–0.2)
BASOPHILS # BLD AUTO: ABNORMAL K/UL (ref 0–0.2)
BASOPHILS NFR BLD: 0 % (ref 0–1.9)
BASOPHILS NFR BLD: 0.1 % (ref 0–1.9)
BILIRUB SERPL-MCNC: 0.4 MG/DL (ref 0.1–1)
BILIRUB UR QL STRIP: NEGATIVE
BNP SERPL-MCNC: 504 PG/ML (ref 0–99)
BUN SERPL-MCNC: 61 MG/DL (ref 8–23)
BUN SERPL-MCNC: 62 MG/DL (ref 8–23)
CALCIUM SERPL-MCNC: 8 MG/DL (ref 8.7–10.5)
CALCIUM SERPL-MCNC: 8.3 MG/DL (ref 8.7–10.5)
CHLORIDE SERPL-SCNC: 108 MMOL/L (ref 95–110)
CHLORIDE SERPL-SCNC: 111 MMOL/L (ref 95–110)
CHOLEST SERPL-MCNC: 71 MG/DL (ref 120–199)
CHOLEST/HDLC SERPL: 3.6 {RATIO} (ref 2–5)
CK SERPL-CCNC: 614 U/L (ref 20–200)
CLARITY UR: CLEAR
CO2 SERPL-SCNC: 10 MMOL/L (ref 23–29)
CO2 SERPL-SCNC: 10 MMOL/L (ref 23–29)
COLOR UR: YELLOW
CREAT SERPL-MCNC: 4.2 MG/DL (ref 0.5–1.4)
CREAT SERPL-MCNC: 4.8 MG/DL (ref 0.5–1.4)
CRP SERPL-MCNC: 375.2 MG/L (ref 0–8.2)
DIFFERENTIAL METHOD: ABNORMAL
DIFFERENTIAL METHOD: ABNORMAL
EOSINOPHIL # BLD AUTO: 0 K/UL (ref 0–0.5)
EOSINOPHIL # BLD AUTO: ABNORMAL K/UL (ref 0–0.5)
EOSINOPHIL NFR BLD: 0 % (ref 0–8)
EOSINOPHIL NFR BLD: 0.1 % (ref 0–8)
ERYTHROCYTE [DISTWIDTH] IN BLOOD BY AUTOMATED COUNT: 14.9 % (ref 11.5–14.5)
ERYTHROCYTE [DISTWIDTH] IN BLOOD BY AUTOMATED COUNT: 15 % (ref 11.5–14.5)
ERYTHROCYTE [SEDIMENTATION RATE] IN BLOOD BY WESTERGREN METHOD: 98 MM/HR (ref 0–10)
EST. GFR  (AFRICAN AMERICAN): 13 ML/MIN/1.73 M^2
EST. GFR  (AFRICAN AMERICAN): 15 ML/MIN/1.73 M^2
EST. GFR  (NON AFRICAN AMERICAN): 11 ML/MIN/1.73 M^2
EST. GFR  (NON AFRICAN AMERICAN): 13 ML/MIN/1.73 M^2
ESTIMATED AVG GLUCOSE: 97 MG/DL (ref 68–131)
GLUCOSE SERPL-MCNC: 134 MG/DL (ref 70–110)
GLUCOSE SERPL-MCNC: 162 MG/DL (ref 70–110)
GLUCOSE UR QL STRIP: ABNORMAL
HBA1C MFR BLD HPLC: 5 % (ref 4–5.6)
HCT VFR BLD AUTO: 29.3 % (ref 40–54)
HCT VFR BLD AUTO: 30.1 % (ref 40–54)
HDLC SERPL-MCNC: 20 MG/DL (ref 40–75)
HDLC SERPL: 28.2 % (ref 20–50)
HGB BLD-MCNC: 10 G/DL (ref 14–18)
HGB BLD-MCNC: 9.5 G/DL (ref 14–18)
HGB UR QL STRIP: ABNORMAL
HYALINE CASTS #/AREA URNS LPF: 0 /LPF
INR PPP: 1.1 (ref 0.8–1.2)
KETONES UR QL STRIP: NEGATIVE
LDLC SERPL CALC-MCNC: 29.8 MG/DL (ref 63–159)
LEUKOCYTE ESTERASE UR QL STRIP: NEGATIVE
LYMPHOCYTES # BLD AUTO: 0.3 K/UL (ref 1–4.8)
LYMPHOCYTES # BLD AUTO: ABNORMAL K/UL (ref 1–4.8)
LYMPHOCYTES NFR BLD: 1 % (ref 18–48)
LYMPHOCYTES NFR BLD: 3.2 % (ref 18–48)
MAGNESIUM SERPL-MCNC: 1.2 MG/DL (ref 1.6–2.6)
MCH RBC QN AUTO: 28.5 PG (ref 27–31)
MCH RBC QN AUTO: 29.1 PG (ref 27–31)
MCHC RBC AUTO-ENTMCNC: 32.4 G/DL (ref 32–36)
MCHC RBC AUTO-ENTMCNC: 33.2 G/DL (ref 32–36)
MCV RBC AUTO: 88 FL (ref 82–98)
MCV RBC AUTO: 88 FL (ref 82–98)
METAMYELOCYTES NFR BLD MANUAL: 1 %
MICROSCOPIC COMMENT: NORMAL
MONOCYTES # BLD AUTO: 0.6 K/UL (ref 0.3–1)
MONOCYTES # BLD AUTO: ABNORMAL K/UL (ref 0.3–1)
MONOCYTES NFR BLD: 6.8 % (ref 4–15)
MONOCYTES NFR BLD: 7 % (ref 4–15)
MYELOCYTES NFR BLD MANUAL: 1 %
NEUTROPHILS # BLD AUTO: 7.4 K/UL (ref 1.8–7.7)
NEUTROPHILS NFR BLD: 65 % (ref 38–73)
NEUTROPHILS NFR BLD: 89.8 % (ref 38–73)
NEUTS BAND NFR BLD MANUAL: 25 %
NITRITE UR QL STRIP: NEGATIVE
NONHDLC SERPL-MCNC: 51 MG/DL
PH UR STRIP: 6 [PH] (ref 5–8)
PHOSPHATE SERPL-MCNC: 3 MG/DL (ref 2.7–4.5)
PLATELET # BLD AUTO: 153 K/UL (ref 150–350)
PLATELET # BLD AUTO: 162 K/UL (ref 150–350)
PLATELET BLD QL SMEAR: ABNORMAL
PMV BLD AUTO: 10 FL (ref 9.2–12.9)
PMV BLD AUTO: 9.9 FL (ref 9.2–12.9)
POTASSIUM SERPL-SCNC: 4 MMOL/L (ref 3.5–5.1)
POTASSIUM SERPL-SCNC: 4 MMOL/L (ref 3.5–5.1)
PROCALCITONIN SERPL IA-MCNC: 11.01 NG/ML
PROT SERPL-MCNC: 7.5 G/DL (ref 6–8.4)
PROT UR QL STRIP: ABNORMAL
PROTHROMBIN TIME: 12.1 SEC (ref 9–12.5)
RBC # BLD AUTO: 3.33 M/UL (ref 4.6–6.2)
RBC # BLD AUTO: 3.44 M/UL (ref 4.6–6.2)
RBC #/AREA URNS HPF: 3 /HPF (ref 0–4)
SODIUM SERPL-SCNC: 132 MMOL/L (ref 136–145)
SODIUM SERPL-SCNC: 134 MMOL/L (ref 136–145)
SP GR UR STRIP: 1.02 (ref 1–1.03)
SQUAMOUS #/AREA URNS HPF: 1 /HPF
TRIGL SERPL-MCNC: 106 MG/DL (ref 30–150)
TROPONIN I SERPL DL<=0.01 NG/ML-MCNC: 0.02 NG/ML (ref 0–0.03)
TROPONIN I SERPL DL<=0.01 NG/ML-MCNC: 0.02 NG/ML (ref 0–0.03)
TROPONIN I SERPL DL<=0.01 NG/ML-MCNC: 0.05 NG/ML (ref 0–0.03)
TSH SERPL DL<=0.005 MIU/L-ACNC: 1.75 UIU/ML (ref 0.4–4)
URN SPEC COLLECT METH UR: ABNORMAL
UROBILINOGEN UR STRIP-ACNC: NEGATIVE EU/DL
VANCOMYCIN SERPL-MCNC: 23.2 UG/ML
WBC # BLD AUTO: 8.19 K/UL (ref 3.9–12.7)
WBC # BLD AUTO: 9.73 K/UL (ref 3.9–12.7)
WBC #/AREA URNS HPF: 1 /HPF (ref 0–5)

## 2019-03-27 PROCEDURE — 84100 ASSAY OF PHOSPHORUS: CPT | Mod: HCNC

## 2019-03-27 PROCEDURE — 96372 THER/PROPH/DIAG INJ SC/IM: CPT | Mod: HCNC

## 2019-03-27 PROCEDURE — 93010 ELECTROCARDIOGRAM REPORT: CPT | Mod: HCNC,,, | Performed by: INTERNAL MEDICINE

## 2019-03-27 PROCEDURE — 11000001 HC ACUTE MED/SURG PRIVATE ROOM: Mod: HCNC

## 2019-03-27 PROCEDURE — 85730 THROMBOPLASTIN TIME PARTIAL: CPT | Mod: HCNC

## 2019-03-27 PROCEDURE — 85007 BL SMEAR W/DIFF WBC COUNT: CPT | Mod: HCNC

## 2019-03-27 PROCEDURE — C9113 INJ PANTOPRAZOLE SODIUM, VIA: HCPCS | Mod: HCNC | Performed by: NURSE PRACTITIONER

## 2019-03-27 PROCEDURE — 81000 URINALYSIS NONAUTO W/SCOPE: CPT | Mod: HCNC

## 2019-03-27 PROCEDURE — 63600175 PHARM REV CODE 636 W HCPCS: Mod: HCNC | Performed by: INTERNAL MEDICINE

## 2019-03-27 PROCEDURE — 85610 PROTHROMBIN TIME: CPT | Mod: HCNC

## 2019-03-27 PROCEDURE — 84484 ASSAY OF TROPONIN QUANT: CPT | Mod: 91,HCNC

## 2019-03-27 PROCEDURE — 21400001 HC TELEMETRY ROOM: Mod: HCNC

## 2019-03-27 PROCEDURE — 86140 C-REACTIVE PROTEIN: CPT | Mod: HCNC

## 2019-03-27 PROCEDURE — 83735 ASSAY OF MAGNESIUM: CPT | Mod: HCNC

## 2019-03-27 PROCEDURE — 25000003 PHARM REV CODE 250: Mod: HCNC | Performed by: INTERNAL MEDICINE

## 2019-03-27 PROCEDURE — 80061 LIPID PANEL: CPT | Mod: HCNC

## 2019-03-27 PROCEDURE — 93005 ELECTROCARDIOGRAM TRACING: CPT | Mod: HCNC

## 2019-03-27 PROCEDURE — 84145 PROCALCITONIN (PCT): CPT | Mod: HCNC

## 2019-03-27 PROCEDURE — 99223 PR INITIAL HOSPITAL CARE,LEVL III: ICD-10-PCS | Mod: HCNC,,, | Performed by: INTERNAL MEDICINE

## 2019-03-27 PROCEDURE — 63600175 PHARM REV CODE 636 W HCPCS: Mod: HCNC | Performed by: NURSE PRACTITIONER

## 2019-03-27 PROCEDURE — 25000003 PHARM REV CODE 250: Mod: HCNC | Performed by: EMERGENCY MEDICINE

## 2019-03-27 PROCEDURE — 96372 THER/PROPH/DIAG INJ SC/IM: CPT | Mod: 59

## 2019-03-27 PROCEDURE — 85025 COMPLETE CBC W/AUTO DIFF WBC: CPT | Mod: HCNC

## 2019-03-27 PROCEDURE — 97530 THERAPEUTIC ACTIVITIES: CPT | Mod: HCNC

## 2019-03-27 PROCEDURE — 25000003 PHARM REV CODE 250: Mod: HCNC | Performed by: NURSE PRACTITIONER

## 2019-03-27 PROCEDURE — 93010 EKG 12-LEAD: ICD-10-PCS | Mod: HCNC,,, | Performed by: INTERNAL MEDICINE

## 2019-03-27 PROCEDURE — 99900038 HC OT GENERIC THERAPY SCREENING (STAT): Mod: HCNC

## 2019-03-27 PROCEDURE — 85027 COMPLETE CBC AUTOMATED: CPT | Mod: HCNC

## 2019-03-27 PROCEDURE — 99285 EMERGENCY DEPT VISIT HI MDM: CPT | Mod: 25

## 2019-03-27 PROCEDURE — 80048 BASIC METABOLIC PNL TOTAL CA: CPT | Mod: HCNC

## 2019-03-27 PROCEDURE — 80053 COMPREHEN METABOLIC PANEL: CPT | Mod: HCNC

## 2019-03-27 PROCEDURE — 99223 1ST HOSP IP/OBS HIGH 75: CPT | Mod: HCNC,,, | Performed by: INTERNAL MEDICINE

## 2019-03-27 PROCEDURE — 80202 ASSAY OF VANCOMYCIN: CPT | Mod: HCNC

## 2019-03-27 PROCEDURE — 97162 PT EVAL MOD COMPLEX 30 MIN: CPT | Mod: HCNC

## 2019-03-27 PROCEDURE — 63600175 PHARM REV CODE 636 W HCPCS: Mod: HCNC | Performed by: FAMILY MEDICINE

## 2019-03-27 PROCEDURE — 36415 COLL VENOUS BLD VENIPUNCTURE: CPT | Mod: HCNC

## 2019-03-27 PROCEDURE — 85651 RBC SED RATE NONAUTOMATED: CPT | Mod: HCNC

## 2019-03-27 PROCEDURE — 94761 N-INVAS EAR/PLS OXIMETRY MLT: CPT | Mod: HCNC

## 2019-03-27 PROCEDURE — 97802 MEDICAL NUTRITION INDIV IN: CPT | Mod: HCNC

## 2019-03-27 PROCEDURE — 83036 HEMOGLOBIN GLYCOSYLATED A1C: CPT | Mod: HCNC

## 2019-03-27 PROCEDURE — 83880 ASSAY OF NATRIURETIC PEPTIDE: CPT | Mod: HCNC

## 2019-03-27 PROCEDURE — 84443 ASSAY THYROID STIM HORMONE: CPT | Mod: HCNC

## 2019-03-27 PROCEDURE — 96361 HYDRATE IV INFUSION ADD-ON: CPT

## 2019-03-27 PROCEDURE — 96374 THER/PROPH/DIAG INJ IV PUSH: CPT

## 2019-03-27 PROCEDURE — 82550 ASSAY OF CK (CPK): CPT | Mod: HCNC

## 2019-03-27 PROCEDURE — 25000003 PHARM REV CODE 250: Mod: HCNC | Performed by: FAMILY MEDICINE

## 2019-03-27 RX ORDER — CARVEDILOL 12.5 MG/1
12.5 TABLET ORAL 2 TIMES DAILY WITH MEALS
Status: DISCONTINUED | OUTPATIENT
Start: 2019-03-27 | End: 2019-03-30

## 2019-03-27 RX ORDER — PANTOPRAZOLE SODIUM 40 MG/1
40 TABLET, DELAYED RELEASE ORAL DAILY
Status: DISCONTINUED | OUTPATIENT
Start: 2019-03-27 | End: 2019-03-27

## 2019-03-27 RX ORDER — VANCOMYCIN HCL IN 5 % DEXTROSE 1.5G/250ML
1500 PLASTIC BAG, INJECTION (ML) INTRAVENOUS
Status: DISCONTINUED | OUTPATIENT
Start: 2019-03-31 | End: 2019-03-30

## 2019-03-27 RX ORDER — VANCOMYCIN HCL IN 5 % DEXTROSE 1.5G/250ML
1500 PLASTIC BAG, INJECTION (ML) INTRAVENOUS ONCE
Status: COMPLETED | OUTPATIENT
Start: 2019-03-27 | End: 2019-03-27

## 2019-03-27 RX ORDER — HEPARIN SODIUM 5000 [USP'U]/ML
5000 INJECTION, SOLUTION INTRAVENOUS; SUBCUTANEOUS EVERY 8 HOURS
Status: DISCONTINUED | OUTPATIENT
Start: 2019-03-27 | End: 2019-04-04 | Stop reason: HOSPADM

## 2019-03-27 RX ORDER — LORAZEPAM 1 MG/1
1 TABLET ORAL
Status: COMPLETED | OUTPATIENT
Start: 2019-03-27 | End: 2019-03-27

## 2019-03-27 RX ORDER — GUAIFENESIN 600 MG/1
600 TABLET, EXTENDED RELEASE ORAL 2 TIMES DAILY
Status: DISCONTINUED | OUTPATIENT
Start: 2019-03-28 | End: 2019-04-04 | Stop reason: HOSPADM

## 2019-03-27 RX ORDER — ACETAMINOPHEN 325 MG/1
650 TABLET ORAL EVERY 8 HOURS PRN
Status: DISCONTINUED | OUTPATIENT
Start: 2019-03-27 | End: 2019-04-04 | Stop reason: HOSPADM

## 2019-03-27 RX ORDER — DOCUSATE SODIUM 100 MG/1
100 CAPSULE, LIQUID FILLED ORAL 2 TIMES DAILY
Status: DISCONTINUED | OUTPATIENT
Start: 2019-03-27 | End: 2019-04-04 | Stop reason: HOSPADM

## 2019-03-27 RX ORDER — IPRATROPIUM BROMIDE AND ALBUTEROL SULFATE 2.5; .5 MG/3ML; MG/3ML
3 SOLUTION RESPIRATORY (INHALATION) EVERY 8 HOURS
Status: DISCONTINUED | OUTPATIENT
Start: 2019-03-28 | End: 2019-04-04 | Stop reason: HOSPADM

## 2019-03-27 RX ORDER — ASPIRIN 81 MG/1
81 TABLET ORAL DAILY
Status: DISCONTINUED | OUTPATIENT
Start: 2019-03-27 | End: 2019-03-27

## 2019-03-27 RX ORDER — SODIUM CHLORIDE 9 MG/ML
1000 INJECTION, SOLUTION INTRAVENOUS
Status: COMPLETED | OUTPATIENT
Start: 2019-03-27 | End: 2019-03-27

## 2019-03-27 RX ORDER — ISOSORBIDE MONONITRATE 60 MG/1
60 TABLET, EXTENDED RELEASE ORAL DAILY
Status: DISCONTINUED | OUTPATIENT
Start: 2019-03-27 | End: 2019-04-04 | Stop reason: HOSPADM

## 2019-03-27 RX ORDER — CLOPIDOGREL BISULFATE 75 MG/1
75 TABLET ORAL DAILY
Status: DISCONTINUED | OUTPATIENT
Start: 2019-03-27 | End: 2019-03-27

## 2019-03-27 RX ORDER — MAGNESIUM SULFATE HEPTAHYDRATE 40 MG/ML
2 INJECTION, SOLUTION INTRAVENOUS ONCE
Status: COMPLETED | OUTPATIENT
Start: 2019-03-27 | End: 2019-03-27

## 2019-03-27 RX ORDER — AMLODIPINE BESYLATE 10 MG/1
10 TABLET ORAL DAILY
Status: DISCONTINUED | OUTPATIENT
Start: 2019-03-28 | End: 2019-04-04 | Stop reason: HOSPADM

## 2019-03-27 RX ORDER — SODIUM CHLORIDE 9 MG/ML
INJECTION, SOLUTION INTRAVENOUS CONTINUOUS
Status: DISCONTINUED | OUTPATIENT
Start: 2019-03-27 | End: 2019-03-28

## 2019-03-27 RX ORDER — AMLODIPINE BESYLATE 10 MG/1
10 TABLET ORAL DAILY
Status: DISCONTINUED | OUTPATIENT
Start: 2019-03-27 | End: 2019-03-27

## 2019-03-27 RX ORDER — AMLODIPINE BESYLATE 5 MG/1
10 TABLET ORAL
Status: COMPLETED | OUTPATIENT
Start: 2019-03-27 | End: 2019-03-27

## 2019-03-27 RX ORDER — CYCLOBENZAPRINE HCL 10 MG
10 TABLET ORAL
Status: DISCONTINUED | OUTPATIENT
Start: 2019-03-27 | End: 2019-03-27

## 2019-03-27 RX ORDER — PANTOPRAZOLE SODIUM 40 MG/10ML
40 INJECTION, POWDER, LYOPHILIZED, FOR SOLUTION INTRAVENOUS DAILY
Status: DISCONTINUED | OUTPATIENT
Start: 2019-03-27 | End: 2019-03-30

## 2019-03-27 RX ADMIN — SODIUM CHLORIDE: 0.9 INJECTION, SOLUTION INTRAVENOUS at 05:03

## 2019-03-27 RX ADMIN — DOCUSATE SODIUM 100 MG: 100 CAPSULE, LIQUID FILLED ORAL at 08:03

## 2019-03-27 RX ADMIN — LORAZEPAM 1 MG: 1 TABLET ORAL at 04:03

## 2019-03-27 RX ADMIN — NITROGLYCERIN 0.5 INCH: 20 OINTMENT TOPICAL at 04:03

## 2019-03-27 RX ADMIN — SODIUM CHLORIDE 500 ML: 0.9 INJECTION, SOLUTION INTRAVENOUS at 02:03

## 2019-03-27 RX ADMIN — VANCOMYCIN HYDROCHLORIDE 1500 MG: 100 INJECTION, POWDER, LYOPHILIZED, FOR SOLUTION INTRAVENOUS at 08:03

## 2019-03-27 RX ADMIN — AMPICILLIN 4000 MG: 2 INJECTION, POWDER, FOR SOLUTION INTRAVENOUS at 09:03

## 2019-03-27 RX ADMIN — GUAIFENESIN 600 MG: 600 TABLET, EXTENDED RELEASE ORAL at 11:03

## 2019-03-27 RX ADMIN — HEPARIN SODIUM 5000 UNITS: 5000 INJECTION, SOLUTION INTRAVENOUS; SUBCUTANEOUS at 05:03

## 2019-03-27 RX ADMIN — ACETAMINOPHEN 650 MG: 325 TABLET ORAL at 05:03

## 2019-03-27 RX ADMIN — ISOSORBIDE MONONITRATE 60 MG: 60 TABLET, EXTENDED RELEASE ORAL at 12:03

## 2019-03-27 RX ADMIN — CEFTRIAXONE 2 G: 2 INJECTION, SOLUTION INTRAVENOUS at 06:03

## 2019-03-27 RX ADMIN — MAGNESIUM SULFATE IN WATER 2 G: 40 INJECTION, SOLUTION INTRAVENOUS at 05:03

## 2019-03-27 RX ADMIN — PANTOPRAZOLE SODIUM 40 MG: 40 INJECTION, POWDER, LYOPHILIZED, FOR SOLUTION INTRAVENOUS at 08:03

## 2019-03-27 RX ADMIN — SODIUM CHLORIDE 1000 ML: 0.9 INJECTION, SOLUTION INTRAVENOUS at 02:03

## 2019-03-27 RX ADMIN — ACYCLOVIR SODIUM 870 MG: 1000 INJECTION, SOLUTION INTRAVENOUS at 08:03

## 2019-03-27 RX ADMIN — ACETAMINOPHEN 650 MG: 325 TABLET ORAL at 11:03

## 2019-03-27 RX ADMIN — CARVEDILOL 12.5 MG: 12.5 TABLET, FILM COATED ORAL at 12:03

## 2019-03-27 RX ADMIN — SODIUM CHLORIDE 500 ML: 0.9 INJECTION, SOLUTION INTRAVENOUS at 04:03

## 2019-03-27 RX ADMIN — PIPERACILLIN AND TAZOBACTAM 4.5 G: 4; .5 INJECTION, POWDER, LYOPHILIZED, FOR SOLUTION INTRAVENOUS; PARENTERAL at 10:03

## 2019-03-27 RX ADMIN — HEPARIN SODIUM 5000 UNITS: 5000 INJECTION, SOLUTION INTRAVENOUS; SUBCUTANEOUS at 09:03

## 2019-03-27 RX ADMIN — HEPARIN SODIUM 5000 UNITS: 5000 INJECTION, SOLUTION INTRAVENOUS; SUBCUTANEOUS at 01:03

## 2019-03-27 RX ADMIN — AMLODIPINE BESYLATE 10 MG: 5 TABLET ORAL at 04:03

## 2019-03-27 NOTE — PROGRESS NOTES
Risk for deterioration alert received.  Patient has been seen and evaluated.   Patient is being admitted to observation. no new changes at this time will continue to monitor. a.m. Labs have been ordered and are pending, will further evaluate   In adjust plan of care pending results / patient hospital course..

## 2019-03-27 NOTE — HPI
69-year-old male patient with extensive medical history presents today with complaint of neck pain that started over the weekend.  Patient reports he was seen in the emergency room a couple days ago was given pain medication and muscle relaxer and discharged home.  Patient reports that treatment with outpatient medications not effective and continues to complain of this pain.  Range of motion of neck worsens.  Associated symptoms include some confusion and weakness endorsed by patient's wife at bedside.  Additionally patient's wife reports patient has not been taking his regular medicines over the last couple of days because she was worried only about his pain and treating that and forgot about his other medicines.  Patient was evaluated in the emergency room.   Significant labs creatinine of 4.8 and BUN of 4.2.  Per emergency room note  No acute findings on CT of cervical spine and CT head.   Hospital Medicine consult.  Patient placed in observation.

## 2019-03-27 NOTE — HOSPITAL COURSE
Patient admitted to hospital with CC Neck Pain. Patient placed on BS abx. Concern is for meningitis as patient continues with AMS, febrile episodes and c/o Neck Pain 10/10. Family at bedside. ID consulted. Patient covered broadly. Patient unable to obtain LP per IR due to his last receiving plavix on Sunday.  Dr Curtis has agreed to do LP Thursday. CT head negative and MRI unable to obtain due to patient uncooperativity / claustrophobia per family. Patient was responding to query correctly at time of exam. He was able to tell me where he is, the year, and the president. Family reports this is an improvement from earlier today. POC discussed in detail.  28 March successful lumbar puncture.  Renal function improving steadily.  Intermittent upper back and neck pain.  No fevers.  CSF HSV1/2 and VZV PCR negative - Stopped Acyclovir.  Added Amitriptyline 25 mg at bedtime to help with insomnia and recurrent pain.  Hgb 7.9 no transfusion.  PICC line placed.  Discharge plan to transfer to [facility] and complete 10 additional days of Ceftriaxone.  Daily PT and OT.

## 2019-03-27 NOTE — ASSESSMENT & PLAN NOTE
Related to meds and high blood pressure suspected.    Resume home meds to control blood pressure.  CT of head negative for any acute findings at this time.  UA negative.     Try to avoid narcotics or any sedated or mood altering medications.    Neuro checks.    Consider further diagnostic testing such as MRI, ultrasound bilateral carotids, echo  Pending course.    Consider Neurology consult pending course.  Further evaluation/diagnostics/interventions/consults pending course

## 2019-03-27 NOTE — PLAN OF CARE
Problem: Adult Inpatient Plan of Care  Goal: Plan of Care Review  Outcome: Ongoing (interventions implemented as appropriate)  Pt arrived to floor. Pt free of falls. Pt and spouse oriented to room. Call light in reach. Side rails x 2. IVF administered as ordered. Sinus tach 100-110 on tele monitor. VSS. Chart reviewed, will continue to monitor.

## 2019-03-27 NOTE — NURSING
Notified Dr Arreola via telephone of patient trend in blood pressure and pulse rate, patient NPO and didn't receive am medications.  Dr Arreola ordered to give am dose of coreg and isosorbide with a sip of water.

## 2019-03-27 NOTE — PT/OT/SLP EVAL
Physical Therapy Evaluation    Patient Name:  Kodi Ribeiro   MRN:  [id number]    Recommendations:     Discharge Recommendations:  nursing facility, skilled(TBD WITH PROGRESS)   Discharge Equipment Recommendations: none   Barriers to discharge: None    Assessment:     Kodi Ribeiro is a 69 y.o. male admitted with a medical diagnosis of Acute encephalopathy.  He presents with the following impairments/functional limitations:  weakness, impaired endurance, impaired functional mobilty, gait instability, impaired balance, decreased coordination, decreased safety awareness, pain.    Rehab Prognosis: Good; patient would benefit from acute skilled PT services to address these deficits and reach maximum level of function.    Recent Surgery: * No surgery found *     Plan:     During this hospitalization, patient to be seen 5 x/week to address the identified rehab impairments via gait training, therapeutic activities, therapeutic exercises and progress toward the following goals:    · Plan of Care Expires:  04/03/19    Subjective     Chief Complaint: NECK PAIN  Patient/Family Comments/goals:   Pain/Comfort:  · Pain Rating 1: 10/10  · Location 1: neck    Patients cultural, spiritual, Roman [employer] conflicts given the current situation:     Living Environment:  PT LIVES WITH WIFE WHO IS ABLE TO ASSIST AS NEEDED, 1 STORY HOUSE NO STEPS, AMB INDEP WITH PROSTHESIS DONNED, STILL DRIVES, WORKS PART TIME, INDEP WITH ADL'S  Prior to admission, patients level of function was.  Equipment used at home: grab bar, wheelchair, walker, rolling.  DME owned (not currently used): none.  Upon discharge, patient will have assistance from WIFE.    Objective:     Communicated with NURSE LEZAMA prior to session.  Patient found supine with telemetry, peripheral IV, oxygen  upon PT entry to room.    General Precautions: Standard, fall   Orthopedic Precautions:N/A   Braces: (SPECIAL SHOE FOR LLE, RLE PROSTHESIS)     Exams:  · Cognitive Exam:  Patient  is oriented to Person and PT CONFUSED/DISORIENTED BUT ABLE TO FOLLOW SOME SIMPLE COMMANDS  · Postural Exam:  Patient presented with the following abnormalities:    · -       Rounded shoulders  · -       Forward head  · -       VERY PAINFUL NECK  · Sensation:    · -       Intact  · RLE ROM: WFL  · LLE ROM: WFL    Functional Mobility:  · Bed Mobility:     · Rolling Left:  moderate assistance  · Rolling Right: moderate assistance  · Scooting: moderate assistance  · Supine to Sit: moderate assistance  · Sit to Supine: moderate assistance  · Balance: POOR+ STATIC SIT    Therapeutic Activities and Exercises:   PT AND FAMILY EDUCATED IN ROLE OF P.T. AND POC, PT PRESENTS AS LETHARGIC BUT ABLE TO AROUSE, RESTLESS AND BABBLING.  PT ASSISTED TO EOB WITH MODA, LIMITED BY NECK PAIN SO ASSISTED BACK TO SUPINE, PT ABLE TO SUPINE SCOOT TOWARD HOB WITH MODA AND DIRECTION    AM-PAC 6 CLICK MOBILITY  Total Score:8     Patient left supine with all lines intact, call button in reach, bed alarm on, NURSE notified and WIFE present.    GOALS:   Multidisciplinary Problems     Physical Therapy Goals        Problem: Physical Therapy Goal    Goal Priority Disciplines Outcome Goal Variances Interventions   Physical Therapy Goal     PT, PT/OT      Description:  LTG'S TO BE MET IN 7 DAYS (4-3-19)  1. PT WILL [facility] FOR BED MOBILITY  2. PT WILL REQUIRE [facility] FOR TF'S  3. PT WILL TOLERATED BLE THEREX X 20 REPS                      History:     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    Late complications of amputation  stump     rseolved with further amputation( MRSA then none since 2014)    Lipoma of colon     colonoscopy 2/21/2014    Myocardial infarction     per patient 2000 & 9/2012    Peripheral vascular disease     Phantom limb syndrome     patient reports only intermittent not problematic, not worsening    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    Spinal cord disease     L4L5 disc    Stroke     Tobacco dependence     resolved    Ureteral stent retained        Past Surgical History:   Procedure Laterality Date    ABDOMINAL AORTIC ANEURYSM REPAIR      ABDOMINAL AORTIC ANEURYSM REPAIR  1996/2014    AMPUTATION, LOWER LIMB      AORTA - BILATERAL FEMORAL ARTERY BYPASS GRAFT  2014    Left and right leg    CATHETERIZATION, HEART, LEFT Left 3/7/2019    [facility] at [facility] CATH LAB    COLONOSCOPY N/A 2/21/2014    [facility] by Isaac Tanner [facility] at [facility] ENDO    CORONARY ANGIOPLASTY WITH STENT PLACEMENT  2000    Three placed in heart    CREATION, BYPASS, ARTERIAL, AORTA TO FEMORAL, BILATERAL Right 3/16/2014    [facility] by Stefan Jamil MD at [facility] OR    CYSTOSCOPY WITH STENT EXCHANGE/RETROGRADE PYELOGRAM Left 4/2/2015    [facility]formed by Scooter Jin IV, MD at [facility] OR    CYSTOSCOPY WITH STENT PLACEMENT Left 5/4/2017    [facility] by Sc[facility] IV, [facility] at [facility] OR    CYSTOSCOPY WITH STENT PLACEMENT Left 4/28/2016    [facility] by Sc[facility] IV, [facility] at [facility] OR    CYSTOSCOPY, WITH RETROGRADE PYELOGRAM Left 5/29/2018    [facility] by Sc[facility] IV, [facility] at [facility] OR    CYSTOSCOPY, WITH URETERAL STENT INSERTION Left 5/29/2018    Per[facility] by Sc[facility] IV, [facility] at [facility] OR    CYSTOSCOPY, WITH URETERAL STENT INSERTION Left 1/23/2014    [facility] by [facility] IV, [facility] at [facility] OR    CYSTOSCOPY, WITH URETERAL STENT REMOVAL Left 5/29/2018    Per[facility] by [facility] IV, [facility] at [facility] OR    EGD (ESOPHAGOGASTRODUODENOSCOPY) N/A 2/21/2014    Performed by Isaac MUSTAFA  MD [facility]    ENDARTERECTOMY-CAROTID Left 8/18/2017    [facility] by [facility] at [facility] OR    FOOT AMPUTATION THROUGH METATARSAL  1996    left    FOOT SURGERY Bilateral 1980's    per patient multiple toe amputations prior to.  partial foot amputation:first great toe then other toes     KIDNEY SURGERY  2014    per patient separation of horseshoe kidney @ time of AAA repair    LUNG LOBECTOMY Right 1970s    per patient not cancer    PYELOGRAM-RETROGRADE Left 5/4/2017    Performed by Scooter [facility] IV[facility] at [facility] OR    RESECTION-BOWEL N/A 3/16/2014    Performed by [facility] at [facility] OR    right below knee amputation  2009 (approx)    SMALL INTESTINE SURGERY  2014    per patient partial @ time of aaa repair  not small bowel - large bowel bowel compromised bythtwe AAAbowel    TONSILLECTOMY  1955 aprox    URETERAL STENT PLACEMENT Left     annually replaced since 2012 or so  Dr Jin       Time Tracking:     PT Received On: 03/27/19  PT Start Time: 0740     PT Stop Time: 0805  PT Total Time (min): 25 min     Billable Minutes: Evaluation 15 and Therapeutic Activity 10     PT ENCOURAGED TO CALL FOR ASSISTANCE WITH ALL NEEDS DUE TO FALL RISK STATUS, PT AGREEABLE    Beatris Guerra, PT  03/27/2019

## 2019-03-27 NOTE — SUBJECTIVE & OBJECTIVE
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    Late complications of amputation stump     rseolved with further amputation( MRSA then none since 2014)    Lipoma of colon     colonoscopy 2/21/2014    Myocardial infarction     per patient 2000 & 9/2012    Peripheral vascular disease     Phantom limb syndrome     patient reports only intermittent not problematic, not worsening    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    Spinal cord disease     L4L5 disc    Stroke     Tobacco dependence     resolved    Ureteral stent retained        Past Surgical History:   Procedure Laterality Date    ABDOMINAL AORTIC ANEURYSM REPAIR      ABDOMINAL AORTIC ANEURYSM REPAIR  1996/2014    AMPUTATION, LOWER LIMB      AORTA - BILATERAL FEMORAL ARTERY BYPASS GRAFT  2014    Left and right leg    CATHETERIZATION, HEART, LEFT Left 3/7/2019    Performed by [facility]one [facility] at [facility] CATH LAB    COLONOSCOPY N/A 2/21/2014    Performed by Isaac Tanner [facility] at [facility] ENDO    CORONARY ANGIOPLASTY WITH STENT PLACEMENT  2000    Three placed in heart    CREATION, BYPASS, ARTERIAL, AORTA TO FEMORAL, BILATERAL Right 3/16/2014    Performed by Stefan Jamil MD at [facility] OR    CYSTOSCOPY WITH STENT EXCHANGE/RETROGRADE PYELOGRAM Left 4/2/2015    Performed by Scooter Jin IV, MD at [facility] OR    CYSTOSCOPY WITH STENT PLACEMENT Left 5/4/2017    Performed by Scooter Jin IV, MD at [facility] OR    CYSTOSCOPY WITH STENT PLACEMENT Left 4/28/2016    Performed by Scooter Jin IV, [facility] at [facility] OR     CYSTOSCOPY, WITH RETROGRADE PYELOGRAM Left 5/29/2018    [facility] at [facility] OR    CYSTOSCOPY, WITH URETERAL STENT INSERTION Left 5/29/2018    Performed by Scooter Jin IV, MD at [facility] OR    CYSTOSCOPY, WITH URETERAL STENT INSERTION Left 1/23/2014    Performed by Scooter Jin IV, MD at [facility] OR    CYSTOSCOPY, WITH URETERAL STENT REMOVAL Left 5/29/2018    Performed by Scooter Jin IV, MD at [facility] OR    EGD (ESOPHAGOGASTRODUODENOSCOPY) N/A 2/21/2014    Performed by Isaac Tanner MD at [facility] ENDO    ENDARTERECTOMY-CAROTID Left 8/18/2017    Performed by Stefan [facility]il MD at [facility] OR    FOOT AMPUTATION THROUGH METATARSAL  1996    left    FOOT SURGERY Bilateral 1980's    per patient multiple toe amputations prior to.  partial foot amputation:first great toe then other toes     KIDNEY SURGERY  2014    per patient separation of horseshoe kidney @ time of AAA repair    LUNG LOBECTOMY Right 1970s    per patient not cancer    PYELOGRAM-RETROGRADE Left 5/4/2017    Performed by Scooter Jin IV, MD at [facility] OR    RESECTION-BOWEL N/A 3/16/2014    Performed by Stefan Jamil MD at [facility] OR    right below knee amputation  2009 (approx)    SMALL INTESTINE SURGERY  2014    per patient partial @ time of aaa repair  not small bowel - large bowel bowel compromised bythtwe AAAbowel    TONSILLECTOMY  1955 aprox    URETERAL STENT PLACEMENT Left     annually replaced since 2012 or so  Dr Jin       Review of patient's allergies indicates:   Allergen Reactions    Morphine Itching       No current facility-administered medications on file prior to encounter.      Current Outpatient Medications on File Prior to Encounter   Medication Sig    amLODIPine (NORVASC) 10 MG tablet TAKE 1 TABLET EVERY DAY    aspirin (ECOTRIN) 81 MG EC tablet Take 1 tablet (81 mg total) by mouth once daily.    carvedilol (COREG) 12.5 MG tablet Take 1 tablet (12.5 mg total) by mouth 2 (two) times  daily with meals.    clopidogrel (PLAVIX) 75 mg tablet TAKE 1 TABLET EVERY DAY    cyclobenzaprine (FLEXERIL) 10 MG tablet Take 0.5 tablets (5 mg total) by mouth 3 (three) times daily as needed for Muscle spasms.    docusate sodium (COLACE) 100 MG capsule Take 1 capsule (100 mg total) by mouth 2 (two) times daily.    ergocalciferol (ERGOCALCIFEROL) 50,000 unit Cap Take 1 capsule (50,000 Units total) by mouth every 7 days.    isosorbide mononitrate (IMDUR) 60 MG 24 hr tablet Take 1 tablet (60 mg total) by mouth once daily.    losartan (COZAAR) 100 MG tablet Take 1 tablet (100 mg total) by mouth once daily.    nitroglycerin (NITROSTAT) 0.6 MG Subl Place 1 tablet (0.6 mg total) under the tongue every 5 (five) minutes as needed (max 3/ per episode).    oxyCODONE-acetaminophen (PERCOCET) 5-325 mg per tablet Take 1 tablet by mouth every 6 (six) hours as needed for Pain.    pantoprazole (PROTONIX) 40 MG tablet Take 1 tablet (40 mg total) by mouth once daily.    pravastatin (PRAVACHOL) 80 MG tablet Take 1 tablet (80 mg total) by mouth every evening.     Family History     Problem Relation (Age of Onset)    COPD Mother    Cancer Mother    Diabetes Daughter    Heart disease Father        Tobacco Use    Smoking status: Former Smoker     Packs/day: 1.00     Years: 15.00     Pack years: 15.00     Last attempt to quit: 1/1/2009     Years since quitting: 10.2    Smokeless tobacco: Never Used   Substance and Sexual Activity    Alcohol use: No    Drug use: No     Comment: Is on prescription opiod, no non prescribed use    Sexual activity: Not Currently     Partners: Female     Review of Systems   Constitutional: Negative for chills, diaphoresis, fatigue and fever.   HENT: Negative for congestion, sore throat and voice change.    Eyes: Negative for photophobia and visual disturbance.   Respiratory: Negative for cough, shortness of breath, wheezing and stridor.    Cardiovascular: Negative for chest pain and leg swelling.    Gastrointestinal: Negative for abdominal distention, abdominal pain, constipation, diarrhea, nausea and vomiting.   Endocrine: Negative for polydipsia, polyphagia and polyuria.   Genitourinary: Negative for difficulty urinating, dysuria, flank pain, testicular pain and urgency.   Musculoskeletal: Positive for neck pain and neck stiffness. Negative for back pain and joint swelling.   Skin: Negative for color change and rash.   Allergic/Immunologic: Negative for immunocompromised state.   Neurological: Negative for dizziness, syncope, weakness, numbness and headaches.   Hematological: Does not bruise/bleed easily.   Psychiatric/Behavioral: Positive for confusion. Negative for agitation and behavioral problems.     Objective:     Vital Signs (Most Recent):  Temp: 98 °F (36.7 °C) (03/27/19 0021)  Pulse: 107 (03/27/19 0444)  Resp: 20 (03/27/19 0444)  BP: (!) 179/79 (03/27/19 0444)  SpO2: 97 % (03/27/19 0444) Vital Signs (24h Range):  Temp:  [98 °F (36.7 °C)] 98 °F (36.7 °C)  Pulse:  [] 107  Resp:  [13-21] 20  SpO2:  [96 %-98 %] 97 %  BP: (143-193)/(55-79) 179/79     Weight: 87.1 kg (192 lb)  Body mass index is 25.33 kg/m².    Physical Exam   Constitutional: He appears well-developed. He has a sickly appearance. No distress.   Unkept chronically ill-appearing male.   HENT:   Head: Normocephalic and atraumatic.   Nose: Nose normal.   Eyes: Pupils are equal, round, and reactive to light. Conjunctivae and EOM are normal. No scleral icterus.   Neck: Normal range of motion. Neck supple. No tracheal deviation present.   Cardiovascular: Normal rate, regular rhythm, normal heart sounds and intact distal pulses.   No murmur heard.  Pulmonary/Chest: Effort normal and breath sounds normal. No stridor. No respiratory distress. He has no wheezes. He has no rales.   Abdominal: Soft. Bowel sounds are normal. He exhibits no distension. There is no tenderness. There is no guarding.   Obese   Musculoskeletal: Normal range of  motion. He exhibits no edema or deformity.   Right lower leg amputation.   Left partial foot amputation.    Generalized weakness.   Neurological: He is alert. No cranial nerve deficit.   Confused at times.Oriented to person place situation   Skin: Skin is warm and dry. Capillary refill takes 2 to 3 seconds. No rash noted. He is not diaphoretic.   Psychiatric: He has a normal mood and affect. His behavior is normal. Judgment and thought content normal.   Nursing note and vitals reviewed.        CRANIAL NERVES     CN III, IV, VI   Pupils are equal, round, and reactive to light.  Extraocular motions are normal.        Significant Labs: All pertinent labs within the past 24 hours have been reviewed.  Results for orders placed or performed during the hospital encounter of 03/27/19   CBC auto differential   Result Value Ref Range    WBC 9.73 3.90 - 12.70 K/uL    RBC 3.44 (L) 4.60 - 6.20 M/uL    Hemoglobin 10.0 (L) 14.0 - 18.0 g/dL    Hematocrit 30.1 (L) 40.0 - 54.0 %    MCV 88 82 - 98 fL    MCH 29.1 27.0 - 31.0 pg    MCHC 33.2 32.0 - 36.0 g/dL    RDW 15.0 (H) 11.5 - 14.5 %    Platelets 162 150 - 350 K/uL    MPV 9.9 9.2 - 12.9 fL    Lymph # CANCELED 1.0 - 4.8 K/uL    Mono # CANCELED 0.3 - 1.0 K/uL    Eos # CANCELED 0.0 - 0.5 K/uL    Baso # CANCELED 0.00 - 0.20 K/uL    Gran% 65.0 38.0 - 73.0 %    Lymph% 1.0 (L) 18.0 - 48.0 %    Mono% 7.0 4.0 - 15.0 %    Eosinophil% 0.0 0.0 - 8.0 %    Basophil% 0.0 0.0 - 1.9 %    Bands 25.0 %    Metamyelocytes 1.0 %    Myelocytes 1.0 %    Platelet Estimate Appears normal     Differential Method Manual    Comprehensive metabolic panel   Result Value Ref Range    Sodium 132 (L) 136 - 145 mmol/L    Potassium 4.0 3.5 - 5.1 mmol/L    Chloride 108 95 - 110 mmol/L    CO2 10 (L) 23 - 29 mmol/L    Glucose 162 (H) 70 - 110 mg/dL    BUN, Bld 62 (H) 8 - 23 mg/dL    Creatinine 4.8 (H) 0.5 - 1.4 mg/dL    Calcium 8.3 (L) 8.7 - 10.5 mg/dL    Total Protein 7.5 6.0 - 8.4 g/dL    Albumin 2.8 (L) 3.5 - 5.2 g/dL     Total Bilirubin 0.4 0.1 - 1.0 mg/dL    Alkaline Phosphatase 173 (H) 55 - 135 U/L    AST 20 10 - 40 U/L    ALT 12 10 - 44 U/L    Anion Gap 14 8 - 16 mmol/L    eGFR if African American 13 (A) >60 mL/min/1.73 m^2    eGFR if non African American 11 (A) >60 mL/min/1.73 m^2   Protime-INR   Result Value Ref Range    Prothrombin Time 12.1 9.0 - 12.5 sec    INR 1.1 0.8 - 1.2   APTT   Result Value Ref Range    aPTT 34.7 (H) 21.0 - 32.0 sec   Urinalysis, Reflex to Urine Culture Urine, Clean Catch   Result Value Ref Range    Specimen UA Urine, Clean Catch     Color, UA Yellow Yellow, Straw, Sneha    Appearance, UA Clear Clear    pH, UA 6.0 5.0 - 8.0    Specific Gravity, UA 1.020 1.005 - 1.030    Protein, UA 2+ (A) Negative    Glucose, UA Trace (A) Negative    Ketones, UA Negative Negative    Bilirubin (UA) Negative Negative    Occult Blood UA 2+ (A) Negative    Nitrite, UA Negative Negative    Urobilinogen, UA Negative <2.0 EU/dL    Leukocytes, UA Negative Negative   Troponin I   Result Value Ref Range    Troponin I 0.020 0.000 - 0.026 ng/mL   Brain natriuretic peptide   Result Value Ref Range     (H) 0 - 99 pg/mL   Urinalysis Microscopic   Result Value Ref Range    RBC, UA 3 0 - 4 /hpf    WBC, UA 1 0 - 5 /hpf    Bacteria, UA None None-Occ /hpf    Squam Epithel, UA 1 /hpf    Hyaline Casts, UA 0 0-1/lpf /lpf    Microscopic Comment SEE COMMENT    Phosphorus   Result Value Ref Range    Phosphorus 3.0 2.7 - 4.5 mg/dL   Magnesium   Result Value Ref Range    Magnesium 1.2 (L) 1.6 - 2.6 mg/dL       Significant Imaging: I have reviewed all pertinent imaging results/findings within the past 24 hours.   Imaging Results          CT Cervical Spine Without Contrast (In process)    Per Virtual radiology, pt's CT cervical spine w/o contrast results NAF               CT Head Without Contrast (In process)    Per [facility] radiology, pt's CT head w/o contrast results NAF                 X-Ray Cervical Spine Complete 5 view (In process)     Per ED physician, pt's cervical spine XR results NAF

## 2019-03-27 NOTE — NURSING
Notified per message Dr Wild of patient c/o posterior neck pain of 10/10 and elevated temperature., awaiting a return call.

## 2019-03-27 NOTE — ASSESSMENT & PLAN NOTE
Nitropaste to chest wall this time.  Resume home meds.    Patient and wife encouraged to follow plan of care and medication regimens.

## 2019-03-27 NOTE — ED PROVIDER NOTES
SCRIBE #1 NOTE: I, Leilani Wolf, am scribing for, and in the presence of, Ricardo Red MD. I have scribed the entire note.      History      Chief Complaint   Patient presents with    Neck Pain     patient c/o neck pain. been c/o neck pain since the weekend, seen at ED sunday and discharge home. patient wife reports hes been more confused and weaker       Review of patient's allergies indicates:   Allergen Reactions    Morphine Itching        HPI   HPI    3/27/2019, 12:54 AM   History obtained from the patient      History of Present Illness: Kodi Ribeiro is a 69 y.o. male patient with a PMHx of anemia, GERD, hyperglycemia, HTN, HLD who presents to the Emergency Department for evaluation of neck pain which onset gradually 5 days ago. Symptoms are constant and moderate in severity. Pt was seen in ED on 3/24/19 for sxs and discharged with Flexeril and Percocet. Pt denies injury. Wife reports pt's sxs have not improved and pt has been more confused and weaker. Wife reports pt has a Cardiology appointment in [place] for tomorrow morning. No mitigating or exacerbating factors reported. Associated sxs include subjective fever, nausea, emesis, decreased appetite. Pt reports he was unable to take his daily medications. Patient denies any CP, SOB, HA, back pain, numbness/weakness, and all other sxs at this time. No further complaints or concerns at this time.       Arrival mode: Personal vehicle      PCP: Norma Nuñez MD       Past Medical History:  Past Medical History:   Diagnosis Date    Anemia     AP (angina pectoris) 1/11/2019    Arthritis     Colon polyp     Repeat colonoscopy due in 9/14    Coronary artery disease     Diverticulosis     colonoscopy 2/21/2014    Encounter for blood transfusion     GERD (gastroesophageal reflux disease)     Hemorrhoids     colonoscopy 2/21/2014    Horseshoe kidney     Hyperglycemia 3/17/2014    Hyperlipidemia     Hypertension     Infection of aortic graft  3/14/2014    Late complications of amputation stump     rseolved with further amputation( MRSA then none since 2014)    Lipoma of colon     colonoscopy 2/21/2014    Myocardial infarction     per patient 2000 & 9/2012    Peripheral vascular disease     Phantom limb syndrome     patient reports only intermittent not problematic, not worsening    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    Spinal cord disease     L4L5 disc    Stroke     Tobacco dependence     resolved    Ureteral stent retained        Past Surgical History:  Past Surgical History:   Procedure Laterality Date    ABDOMINAL AORTIC ANEURYSM REPAIR      ABDOMINAL AORTIC ANEURYSM REPAIR  1996/2014    AMPUTATION, LOWER LIMB      AORTA - BILATERAL FEMORAL ARTERY BYPASS GRAFT  2014    Left and right leg    CATHETERIZATION, HEART, LEFT Left 3/7/2019    Performed by [facility]riel [facility]one [facility] at [facility] CATH LAB    COLONOSCOPY N/A 2/21/2014    Performed by Isaac Tanner MD at [facility] ENDO    CORONARY ANGIOPLASTY WITH STENT PLACEMENT  2000    Three placed in heart    CREATION, BYPASS, ARTERIAL, AORTA TO FEMORAL, BILATERAL Right 3/16/2014    Performed by Stefan Jamil MD at [facility] OR    CYSTOSCOPY WITH STENT EXCHANGE/RETROGRADE PYELOGRAM Left 4/2/2015    Performed by Scooter Jin IV, MD at [facility] OR    CYSTOSCOPY WITH STENT PLACEMENT Left 5/4/2017    Performed by Scooter Jin IV, MD at [facility] OR    CYSTOSCOPY WITH STENT PLACEMENT Left 4/28/2016    Performed by Scooter Jin IV, MD at [facility] OR    CYSTOSCOPY, WITH RETROGRADE PYELOGRAM Left 5/29/2018    Performed by Scooter Jin IV, MD at [facility] OR    CYSTOSCOPY, WITH URETERAL STENT INSERTION Left 5/29/2018    Performed by Scooter Jin IV, MD at [facility] OR    CYSTOSCOPY, WITH URETERAL STENT INSERTION Left 1/23/2014    Performed by Scooter Jin IV, MD at [facility] OR    CYSTOSCOPY, WITH URETERAL STENT REMOVAL Left 5/29/2018    Performed by Scooter Jin IV, MD at [facility] OR    EGD  (ESOPHAGOGASTRODUODENOSCOPY) N/A 2/21/2014    Performed by [facility]anner [facility] at [facility] ENDO    ENDARTERECTOMY-CAROTID Left 8/18/2017    Performed by [facility] at [facility] OR    FOOT AMPUTATION THROUGH METATARSAL  1996    left    FOOT SURGERY Bilateral 1980's    per patient multiple toe amputations prior to.  partial foot amputation:first great toe then other toes     KIDNEY SURGERY  2014    per patient separation of horseshoe kidney @ time of AAA repair    LUNG LOBECTOMY Right 1970s    per patient not cancer    PYELOGRAM-RETROGRADE Left 5/4/2017    [facility] by Scooter Jin IV, [facility] at [facility] OR    RESECTION-BOWEL N/A 3/16/2014    Performed by Stefan [facility]il [facility] at [facility] OR    right below knee amputation  2009 (approx)    SMALL INTESTINE SURGERY  2014    per patient partial @ time of aaa repair  not small bowel - large bowel bowel compromised bythtwe AAAbowel    TONSILLECTOMY  1955 aprox    URETERAL STENT PLACEMENT Left     annually replaced since 2012 or so  Dr Jin         Family History:  Family History   Problem Relation Age of Onset    Cancer Mother         lung    COPD Mother     Heart disease Father         MI but per patient bc of old age    Diabetes Daughter     Eczema Neg Hx     Lupus Neg Hx     Psoriasis Neg Hx     Melanoma Neg Hx     Kidney disease Neg Hx     Stroke Neg Hx     Mental illness Neg Hx     Mental retardation Neg Hx     Hypertension Neg Hx     Hyperlipidemia Neg Hx     Drug abuse Neg Hx     Alcohol abuse Neg Hx     Depression Neg Hx        Social History:  Social History     Tobacco Use    Smoking status: Former Smoker     Packs/day: 1.00     Years: 15.00     Pack years: 15.00     Last attempt to quit: 1/1/2009     Years since quitting: 10.2    Smokeless tobacco: Never Used   Substance and Sexual Activity    Alcohol use: No    Drug use: No     Comment: Is on prescription opiod, no non prescribed use    Sexual activity: Not Currently      Partners: Female       ROS   Review of Systems   Constitutional: Positive for appetite change (decreased) and fever. Negative for chills.   HENT: Negative for congestion, ear pain and sore throat.    Respiratory: Negative for cough and shortness of breath.    Cardiovascular: Negative for chest pain.   Gastrointestinal: Positive for nausea and vomiting. Negative for abdominal pain and diarrhea.   Genitourinary: Negative for dysuria.   Musculoskeletal: Positive for neck pain and neck stiffness. Negative for back pain.   Skin: Negative for rash.   Neurological: Negative for dizziness, syncope, weakness, numbness and headaches.   Hematological: Does not bruise/bleed easily.   All other systems reviewed and are negative.    Physical Exam      Initial Vitals [03/27/19 0021]   BP Pulse Resp Temp SpO2   (!) 143/59 98 20 98 °F (36.7 °C) 96 %      MAP       --          Physical Exam  Nursing Notes and Vital Signs Reviewed.  Constitutional: Patient is in mild distress. Well-developed and well-nourished.  Head: Atraumatic. Normocephalic.  Eyes: PERRL. EOM intact. Conjunctivae are not pale. No scleral icterus.  ENT: Mucous membranes are moist. Oropharynx is clear and symmetric.    Neck: Bilateral posterior cervical tenderness. No deformity. No erythema. No mass.  Cardiovascular: Regular rate. Regular rhythm. No murmurs, rubs, or gallops. Distal pulses are 2+ and symmetric.  Pulmonary/Chest: No respiratory distress. Clear to auscultation bilaterally. No wheezing or rales.  Abdominal: Soft and non-distended.  There is no tenderness.  No rebound, guarding, or rigidity. Good bowel sounds.  Genitourinary: No CVA tenderness  Musculoskeletal: Moves all extremities. No obvious deformities. No edema. No calf tenderness.  Skin: Warm and dry.  Neurological:  Alert, awake, and appropriate.  Normal speech.  No acute focal neurological deficits are appreciated.  Psychiatric: Normal affect. Good eye contact. Appropriate in content.    ED  "Course    Procedures  ED Vital Signs:  Vitals:    03/27/19 0021 03/27/19 0113 03/27/19 0115 03/27/19 0232   BP: (!) 143/59  (!) 157/75 (!) 173/77   Pulse: 98 95 94 92   Resp: 20  18 (!) 21   Temp: 98 °F (36.7 °C)      TempSrc: Oral      SpO2: 96%  96% 97%   Weight:       Height: 6' 1" (1.854 m)       03/27/19 0235 03/27/19 0346 03/27/19 0444 03/27/19 0517   BP:  (!) 193/55 (!) 179/79 (!) 176/79   Pulse:  101 107 106   Resp:  13 20 20   Temp:   98.5 °F (36.9 °C) 97.8 °F (36.6 °C)   TempSrc:   Oral Oral   SpO2:  98% 97% (!) 92%   Weight: 87.1 kg (192 lb)      Height:           Abnormal Lab Results:  Labs Reviewed   CBC W/ AUTO DIFFERENTIAL - Abnormal; Notable for the following components:       Result Value    RBC 3.44 (*)     Hemoglobin 10.0 (*)     Hematocrit 30.1 (*)     RDW 15.0 (*)     Lymph% 1.0 (*)     All other components within normal limits   COMPREHENSIVE METABOLIC PANEL - Abnormal; Notable for the following components:    Sodium 132 (*)     CO2 10 (*)     Glucose 162 (*)     BUN, Bld 62 (*)     Creatinine 4.8 (*)     Calcium 8.3 (*)     Albumin 2.8 (*)     Alkaline Phosphatase 173 (*)     eGFR if  13 (*)     eGFR if non  11 (*)     All other components within normal limits   APTT - Abnormal; Notable for the following components:    aPTT 34.7 (*)     All other components within normal limits   URINALYSIS, REFLEX TO URINE CULTURE - Abnormal; Notable for the following components:    Protein, UA 2+ (*)     Glucose, UA Trace (*)     Occult Blood UA 2+ (*)     All other components within normal limits    Narrative:     Preferred Collection Type->Urine, Clean Catch   B-TYPE NATRIURETIC PEPTIDE - Abnormal; Notable for the following components:     (*)     All other components within normal limits   MAGNESIUM - Abnormal; Notable for the following components:    Magnesium 1.2 (*)     All other components within normal limits   PROTIME-INR   TROPONIN I   URINALYSIS MICROSCOPIC "    Narrative:     Preferred Collection Type->Urine, Clean Catch   MAGNESIUM   PHOSPHORUS   TSH   PHOSPHORUS   CBC W/ AUTO DIFFERENTIAL   BASIC METABOLIC PANEL   HEMOGLOBIN A1C   LIPID PANEL   TROPONIN I        All Lab Results:  Results for orders placed or performed during the hospital encounter of 03/27/19   CBC auto differential   Result Value Ref Range    WBC 9.73 3.90 - 12.70 K/uL    RBC 3.44 (L) 4.60 - 6.20 M/uL    Hemoglobin 10.0 (L) 14.0 - 18.0 g/dL    Hematocrit 30.1 (L) 40.0 - 54.0 %    MCV 88 82 - 98 fL    MCH 29.1 27.0 - 31.0 pg    MCHC 33.2 32.0 - 36.0 g/dL    RDW 15.0 (H) 11.5 - 14.5 %    Platelets 162 150 - 350 K/uL    MPV 9.9 9.2 - 12.9 fL    Lymph # CANCELED 1.0 - 4.8 K/uL    Mono # CANCELED 0.3 - 1.0 K/uL    Eos # CANCELED 0.0 - 0.5 K/uL    Baso # CANCELED 0.00 - 0.20 K/uL    Gran% 65.0 38.0 - 73.0 %    Lymph% 1.0 (L) 18.0 - 48.0 %    Mono% 7.0 4.0 - 15.0 %    Eosinophil% 0.0 0.0 - 8.0 %    Basophil% 0.0 0.0 - 1.9 %    Bands 25.0 %    Metamyelocytes 1.0 %    Myelocytes 1.0 %    Platelet Estimate Appears normal     Differential Method Manual    Comprehensive metabolic panel   Result Value Ref Range    Sodium 132 (L) 136 - 145 mmol/L    Potassium 4.0 3.5 - 5.1 mmol/L    Chloride 108 95 - 110 mmol/L    CO2 10 (L) 23 - 29 mmol/L    Glucose 162 (H) 70 - 110 mg/dL    BUN, Bld 62 (H) 8 - 23 mg/dL    Creatinine 4.8 (H) 0.5 - 1.4 mg/dL    Calcium 8.3 (L) 8.7 - 10.5 mg/dL    Total Protein 7.5 6.0 - 8.4 g/dL    Albumin 2.8 (L) 3.5 - 5.2 g/dL    Total Bilirubin 0.4 0.1 - 1.0 mg/dL    Alkaline Phosphatase 173 (H) 55 - 135 U/L    AST 20 10 - 40 U/L    ALT 12 10 - 44 U/L    Anion Gap 14 8 - 16 mmol/L    eGFR if African American 13 (A) >60 mL/min/1.73 m^2    eGFR if non African American 11 (A) >60 mL/min/1.73 m^2   Protime-INR   Result Value Ref Range    Prothrombin Time 12.1 9.0 - 12.5 sec    INR 1.1 0.8 - 1.2   APTT   Result Value Ref Range    aPTT 34.7 (H) 21.0 - 32.0 sec   Urinalysis, Reflex to Urine Culture  Urine, Clean Catch   Result Value Ref Range    Specimen UA Urine, Clean Catch     Color, UA Yellow Yellow, Straw, Sneha    Appearance, UA Clear Clear    pH, UA 6.0 5.0 - 8.0    Specific Gravity, UA 1.020 1.005 - 1.030    Protein, UA 2+ (A) Negative    Glucose, UA Trace (A) Negative    Ketones, UA Negative Negative    Bilirubin (UA) Negative Negative    Occult Blood UA 2+ (A) Negative    Nitrite, UA Negative Negative    Urobilinogen, UA Negative <2.0 EU/dL    Leukocytes, UA Negative Negative   Troponin I   Result Value Ref Range    Troponin I 0.020 0.000 - 0.026 ng/mL   Brain natriuretic peptide   Result Value Ref Range     (H) 0 - 99 pg/mL   Urinalysis Microscopic   Result Value Ref Range    RBC, UA 3 0 - 4 /hpf    WBC, UA 1 0 - 5 /hpf    Bacteria, UA None None-Occ /hpf    Squam Epithel, UA 1 /hpf    Hyaline Casts, UA 0 0-1/lpf /lpf    Microscopic Comment SEE COMMENT    Phosphorus   Result Value Ref Range    Phosphorus 3.0 2.7 - 4.5 mg/dL   Magnesium   Result Value Ref Range    Magnesium 1.2 (L) 1.6 - 2.6 mg/dL   TSH   Result Value Ref Range    TSH 1.747 0.400 - 4.000 uIU/mL         Imaging Results:  Imaging Results          CT Cervical Spine Without Contrast (In process)                CT Head Without Contrast (In process)                X-Ray Cervical Spine Complete 5 view (In process)                Per ED physician, pt's cervical spine XR results NAF    Per Virtual radiology, pt's CT head w/o contrast results NAF    Per Virtual radiology, pt's CT cervical spine w/o contrast results NAF             The Emergency Provider reviewed the vital signs and test results, which are outlined above.    ED Discussion     2:18 AM: Re-evaluated pt. Pt states he is still in pain.  D/w pt all pertinent results. D/w pt any concerns expressed at this time. Answered all questions. Pt expresses understanding at this time.    4:19 AM: Discussed case with Aurelio Pina NP (Hospital Medicine). Dr. Garcia agrees with current care  and management of pt and accepts admission.   Admitting Service: Hospital medicine   Admitting Physician: Dr. Garcia  Admit to: Obs Med/Tele    4:19 AM: Re-evaluated pt. I have discussed test results, shared treatment plan, and the need for admission with patient and family at bedside. Pt and family express understanding at this time and agree with all information. All questions answered. Pt and family have no further questions or concerns at this time. Pt is ready for admit.      ED Medication(s):  Medications   0.9%  NaCl infusion (has no administration in time range)   pantoprazole injection 40 mg (has no administration in time range)   acetaminophen tablet 650 mg (has no administration in time range)   magnesium sulfate 2g in water 50mL IVPB (premix) (has no administration in time range)   heparin (porcine) injection 5,000 Units (has no administration in time range)   sodium chloride 0.9% bolus 500 mL (0 mLs Intravenous Stopped 3/27/19 0350)   0.9%  NaCl infusion (0 mLs Intravenous Stopped 3/27/19 0445)   amLODIPine tablet 10 mg (10 mg Oral Given 3/27/19 0409)   LORazepam tablet 1 mg (1 mg Oral Given 3/27/19 0409)   nitroGLYCERIN 2% TD oint ointment 0.5 inch (0.5 inches Topical (Top) Given 3/27/19 0448)   sodium chloride 0.9% bolus 500 mL (500 mLs Intravenous New Bag 3/27/19 0448)             Medical Decision Making    Medical Decision Making:   Clinical Tests:   Lab Tests: Ordered and Reviewed  Radiological Study: Ordered and Reviewed  Medical Tests: Ordered and Reviewed           Scribe Attestation:   Scribe #1: I performed the above scribed service and the documentation accurately describes the services I performed. I attest to the accuracy of the note.    Attending:   Physician Attestation Statement for Scribe #1: I, Ricardo Red MD, personally performed the services described in this documentation, as scribed by Leilani Wolf, in my presence, and it is both accurate and complete.          Clinical Impression        ICD-10-CM ICD-9-CM   1. Acute renal failure superimposed on chronic kidney disease, unspecified CKD stage, unspecified acute renal failure type N17.9 584.9    N18.9 585.9   2. Altered mental status R41.82 780.97   3. Neck pain, bilateral M54.2 723.1   4. MARCELA (acute kidney injury) N17.9 584.9       Disposition:   Disposition: Admitted  Condition: Fair         Ricardo Red MD  03/27/19 0529

## 2019-03-27 NOTE — H&P
[facility] Hospital Medicine  History & Physical    Patient Name: Kodi Ribeiro  MRN: [id number]  Admission Date: 3/27/2019  Attending Physician: Arabella Garcia MD  Primary Care Provider: Norma Nuñez MD         Patient information was obtained from patient, past medical records and ER records.     Subjective:     Principal Problem:Acute encephalopathy    Chief Complaint:   Chief Complaint   Patient presents with    Neck Pain     patient c/o neck pain. been c/o neck pain since the weekend, seen at ED sunday and discharge home. patient wife reports hes been more confused and weaker        HPI:  69-year-old male patient with extensive medical history presents today with complaint of neck pain that started over the weekend.  Patient reports he was seen in the emergency room a couple days ago was given pain medication and muscle relaxer and discharged home.  Patient reports that treatment with outpatient medications not effective and continues to complain of this pain.  Range of motion of neck worsens.  Associated symptoms include some confusion and weakness endorsed by patient's wife at bedside.  Additionally patient's wife reports patient has not been taking his regular medicines over the last couple of days because she was worried only about his pain and treating that and forgot about his other medicines.  Patient was evaluated in the emergency room.   Significant labs creatinine of 4.8 and BUN of 4.2.  Per emergency room note  No acute findings on CT of cervical spine and CT head.   Hospital Medicine consult.  Patient placed in observation.    Past Medical History:   Diagnosis Date    Anemia     AP (angina pectoris) 1/11/2019    Arthritis     Colon polyp     Repeat colonoscopy due in 9/14    Coronary artery disease     Diverticulosis     colonoscopy 2/21/2014    Encounter for blood transfusion     GERD (gastroesophageal reflux disease)     Hemorrhoids     colonoscopy 2/21/2014     Horseshoe kidney     Hyperglycemia 3/17/2014    Hyperlipidemia     Hypertension     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    Late complications of amputation stump     rseolved with further amputation( MRSA then none since 2014)    Lipoma of colon     colonoscopy 2/21/2014    Myocardial infarction     per patient 2000 & 9/2012    Peripheral vascular disease     Phantom limb syndrome     patient reports only intermittent not problematic, not worsening    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    Spinal cord disease     L4L5 disc    Stroke     Tobacco dependence     resolved    Ureteral stent retained        Past Surgical History:   Procedure Laterality Date    ABDOMINAL AORTIC ANEURYSM REPAIR      ABDOMINAL AORTIC ANEURYSM REPAIR  1996/2014    AMPUTATION, LOWER LIMB      AORTA - BILATERAL FEMORAL ARTERY BYPASS GRAFT  2014    Left and right leg    CATHETERIZATION, HEART, LEFT Left 3/7/2019    Performed by Adriel Boone MD at [facility] CATH LAB    COLONOSCOPY N/A 2/21/2014    Performed by Isaac Tanner MD at [facility] ENDO    CORONARY ANGIOPLASTY WITH STENT PLACEMENT  2000    Three placed in heart    CREATION, BYPASS, ARTERIAL, AORTA TO FEMORAL, BILATERAL Right 3/16/2014    Performed by Stefan Jamil MD at [facility] OR    CYSTOSCOPY WITH STENT EXCHANGE/RETROGRADE PYELOGRAM Left 4/2/2015    Performed by Scooter Jin IV, MD at [facility] OR    CYSTOSCOPY WITH STENT PLACEMENT Left 5/4/2017    Performed by Scooter Jin IV, MD at [facility] OR    CYSTOSCOPY WITH STENT PLACEMENT Left 4/28/2016    Performed by Scooter Jin IV, MD at [facility] OR    CYSTOSCOPY, WITH RETROGRADE PYELOGRAM Left 5/29/2018    Performed by Scooter Jin IV, MD at [facility] OR    CYSTOSCOPY, WITH URETERAL STENT INSERTION Left 5/29/2018    Performed by Scooter Jin IV, MD at [facility] OR    CYSTOSCOPY, WITH URETERAL STENT INSERTION Left 1/23/2014    Performed by Scooter Jin IV, MD at [facility] OR    CYSTOSCOPY, WITH  URETERAL STENT REMOVAL Left 5/29/2018    Performed by Scooter Jin IV, MD at [facility] OR    EGD (ESOPHAGOGASTRODUODENOSCOPY) N/A 2/21/2014    Performed by Isaac Tanner MD at [facility] ENDO    ENDARTERECTOMY-CAROTID Left 8/18/2017    Performed by Stefan Jamil MD at [facility] OR    FOOT AMPUTATION THROUGH METATARSAL  1996    left    FOOT SURGERY Bilateral 1980's    per patient multiple toe amputations prior to.  partial foot amputation:first great toe then other toes     KIDNEY SURGERY  2014    per patient separation of horseshoe kidney @ time of AAA repair    LUNG LOBECTOMY Right 1970s    per patient not cancer    PYELOGRAM-RETROGRADE Left 5/4/2017    Performed by Scooter Jin IV, MD at [facility] OR    RESECTION-BOWEL N/A 3/16/2014    Performed by Stefan Jamil MD at [facility] OR    right below knee amputation  2009 (approx)    SMALL INTESTINE SURGERY  2014    per patient partial @ time of aaa repair  not small bowel - large bowel bowel compromised bythtwe AAAbowel    TONSILLECTOMY  1955 aprox    URETERAL STENT PLACEMENT Left     annually replaced since 2012 or so  Dr Jin       Review of patient's allergies indicates:   Allergen Reactions    Morphine Itching       No current facility-administered medications on file prior to encounter.      Current Outpatient Medications on File Prior to Encounter   Medication Sig    amLODIPine (NORVASC) 10 MG tablet TAKE 1 TABLET EVERY DAY    aspirin (ECOTRIN) 81 MG EC tablet Take 1 tablet (81 mg total) by mouth once daily.    carvedilol (COREG) 12.5 MG tablet Take 1 tablet (12.5 mg total) by mouth 2 (two) times daily with meals.    clopidogrel (PLAVIX) 75 mg tablet TAKE 1 TABLET EVERY DAY    cyclobenzaprine (FLEXERIL) 10 MG tablet Take 0.5 tablets (5 mg total) by mouth 3 (three) times daily as needed for Muscle spasms.    docusate sodium (COLACE) 100 MG capsule Take 1 capsule (100 mg total) by mouth 2 (two) times daily.    ergocalciferol (ERGOCALCIFEROL)  50,000 unit Cap Take 1 capsule (50,000 Units total) by mouth every 7 days.    isosorbide mononitrate (IMDUR) 60 MG 24 hr tablet Take 1 tablet (60 mg total) by mouth once daily.    losartan (COZAAR) 100 MG tablet Take 1 tablet (100 mg total) by mouth once daily.    nitroglycerin (NITROSTAT) 0.6 MG Subl Place 1 tablet (0.6 mg total) under the tongue every 5 (five) minutes as needed (max 3/ per episode).    oxyCODONE-acetaminophen (PERCOCET) 5-325 mg per tablet Take 1 tablet by mouth every 6 (six) hours as needed for Pain.    pantoprazole (PROTONIX) 40 MG tablet Take 1 tablet (40 mg total) by mouth once daily.    pravastatin (PRAVACHOL) 80 MG tablet Take 1 tablet (80 mg total) by mouth every evening.     Family History     Problem Relation (Age of Onset)    COPD Mother    Cancer Mother    Diabetes Daughter    Heart disease Father        Tobacco Use    Smoking status: Former Smoker     Packs/day: 1.00     Years: 15.00     Pack years: 15.00     Last attempt to quit: 1/1/2009     Years since quitting: 10.2    Smokeless tobacco: Never Used   Substance and Sexual Activity    Alcohol use: No    Drug use: No     Comment: Is on prescription opiod, no non prescribed use    Sexual activity: Not Currently     Partners: Female     Review of Systems   Constitutional: Negative for chills, diaphoresis, fatigue and fever.   HENT: Negative for congestion, sore throat and voice change.    Eyes: Negative for photophobia and visual disturbance.   Respiratory: Negative for cough, shortness of breath, wheezing and stridor.    Cardiovascular: Negative for chest pain and leg swelling.   Gastrointestinal: Negative for abdominal distention, abdominal pain, constipation, diarrhea, nausea and vomiting.   Endocrine: Negative for polydipsia, polyphagia and polyuria.   Genitourinary: Negative for difficulty urinating, dysuria, flank pain, testicular pain and urgency.   Musculoskeletal: Positive for neck pain and neck stiffness. Negative  for back pain and joint swelling.   Skin: Negative for color change and rash.   Allergic/Immunologic: Negative for immunocompromised state.   Neurological: Negative for dizziness, syncope, weakness, numbness and headaches.   Hematological: Does not bruise/bleed easily.   Psychiatric/Behavioral: Positive for confusion. Negative for agitation and behavioral problems.     Objective:     Vital Signs (Most Recent):  Temp: 98 °F (36.7 °C) (03/27/19 0021)  Pulse: 107 (03/27/19 0444)  Resp: 20 (03/27/19 0444)  BP: (!) 179/79 (03/27/19 0444)  SpO2: 97 % (03/27/19 0444) Vital Signs (24h Range):  Temp:  [98 °F (36.7 °C)] 98 °F (36.7 °C)  Pulse:  [] 107  Resp:  [13-21] 20  SpO2:  [96 %-98 %] 97 %  BP: (143-193)/(55-79) 179/79     Weight: 87.1 kg (192 lb)  Body mass index is 25.33 kg/m².    Physical Exam   Constitutional: He appears well-developed. He has a sickly appearance. No distress.   Unkept chronically ill-appearing male.   HENT:   Head: Normocephalic and atraumatic.   Nose: Nose normal.   Eyes: Pupils are equal, round, and reactive to light. Conjunctivae and EOM are normal. No scleral icterus.   Neck: Normal range of motion. Neck supple. No tracheal deviation present.   Cardiovascular: Normal rate, regular rhythm, normal heart sounds and intact distal pulses.   No murmur heard.  Pulmonary/Chest: Effort normal and breath sounds normal. No stridor. No respiratory distress. He has no wheezes. He has no rales.   Abdominal: Soft. Bowel sounds are normal. He exhibits no distension. There is no tenderness. There is no guarding.   Obese   Musculoskeletal: Normal range of motion. He exhibits no edema or deformity.   Right lower leg amputation.   Left partial foot amputation.    Generalized weakness.   Neurological: He is alert. No cranial nerve deficit.   Confused at times.Oriented to person place situation   Skin: Skin is warm and dry. Capillary refill takes 2 to 3 seconds. No rash noted. He is not diaphoretic.    Psychiatric: He has a normal mood and affect. His behavior is normal. Judgment and thought content normal.   Nursing note and vitals reviewed.        CRANIAL NERVES     CN III, IV, VI   Pupils are equal, round, and reactive to light.  Extraocular motions are normal.        Significant Labs: All pertinent labs within the past 24 hours have been reviewed.  Results for orders placed or performed during the hospital encounter of 03/27/19   CBC auto differential   Result Value Ref Range    WBC 9.73 3.90 - 12.70 K/uL    RBC 3.44 (L) 4.60 - 6.20 M/uL    Hemoglobin 10.0 (L) 14.0 - 18.0 g/dL    Hematocrit 30.1 (L) 40.0 - 54.0 %    MCV 88 82 - 98 fL    MCH 29.1 27.0 - 31.0 pg    MCHC 33.2 32.0 - 36.0 g/dL    RDW 15.0 (H) 11.5 - 14.5 %    Platelets 162 150 - 350 K/uL    MPV 9.9 9.2 - 12.9 fL    Lymph # CANCELED 1.0 - 4.8 K/uL    Mono # CANCELED 0.3 - 1.0 K/uL    Eos # CANCELED 0.0 - 0.5 K/uL    Baso # CANCELED 0.00 - 0.20 K/uL    Gran% 65.0 38.0 - 73.0 %    Lymph% 1.0 (L) 18.0 - 48.0 %    Mono% 7.0 4.0 - 15.0 %    Eosinophil% 0.0 0.0 - 8.0 %    Basophil% 0.0 0.0 - 1.9 %    Bands 25.0 %    Metamyelocytes 1.0 %    Myelocytes 1.0 %    Platelet Estimate Appears normal     Differential Method Manual    Comprehensive metabolic panel   Result Value Ref Range    Sodium 132 (L) 136 - 145 mmol/L    Potassium 4.0 3.5 - 5.1 mmol/L    Chloride 108 95 - 110 mmol/L    CO2 10 (L) 23 - 29 mmol/L    Glucose 162 (H) 70 - 110 mg/dL    BUN, Bld 62 (H) 8 - 23 mg/dL    Creatinine 4.8 (H) 0.5 - 1.4 mg/dL    Calcium 8.3 (L) 8.7 - 10.5 mg/dL    Total Protein 7.5 6.0 - 8.4 g/dL    Albumin 2.8 (L) 3.5 - 5.2 g/dL    Total Bilirubin 0.4 0.1 - 1.0 mg/dL    Alkaline Phosphatase 173 (H) 55 - 135 U/L    AST 20 10 - 40 U/L    ALT 12 10 - 44 U/L    Anion Gap 14 8 - 16 mmol/L    eGFR if African American 13 (A) >60 mL/min/1.73 m^2    eGFR if non African American 11 (A) >60 mL/min/1.73 m^2   Protime-INR   Result Value Ref Range    Prothrombin Time 12.1 9.0 - 12.5  sec    INR 1.1 0.8 - 1.2   APTT   Result Value Ref Range    aPTT 34.7 (H) 21.0 - 32.0 sec   Urinalysis, Reflex to Urine Culture Urine, Clean Catch   Result Value Ref Range    Specimen UA Urine, Clean Catch     Color, UA Yellow Yellow, Straw, Sneha    Appearance, UA Clear Clear    pH, UA 6.0 5.0 - 8.0    Specific Gravity, UA 1.020 1.005 - 1.030    Protein, UA 2+ (A) Negative    Glucose, UA Trace (A) Negative    Ketones, UA Negative Negative    Bilirubin (UA) Negative Negative    Occult Blood UA 2+ (A) Negative    Nitrite, UA Negative Negative    Urobilinogen, UA Negative <2.0 EU/dL    Leukocytes, UA Negative Negative   Troponin I   Result Value Ref Range    Troponin I 0.020 0.000 - 0.026 ng/mL   Brain natriuretic peptide   Result Value Ref Range     (H) 0 - 99 pg/mL   Urinalysis Microscopic   Result Value Ref Range    RBC, UA 3 0 - 4 /hpf    WBC, UA 1 0 - 5 /hpf    Bacteria, UA None None-Occ /hpf    Squam Epithel, UA 1 /hpf    Hyaline Casts, UA 0 0-1/lpf /lpf    Microscopic Comment SEE COMMENT    Phosphorus   Result Value Ref Range    Phosphorus 3.0 2.7 - 4.5 mg/dL   Magnesium   Result Value Ref Range    Magnesium 1.2 (L) 1.6 - 2.6 mg/dL       Significant Imaging: I have reviewed all pertinent imaging results/findings within the past 24 hours.   Imaging Results          CT Cervical Spine Without Contrast (In process)    Per Virtual radiology, pt's CT cervical spine w/o contrast results NAF               CT Head Without Contrast (In process)    Per Virtual radiology, pt's CT head w/o contrast results NAF                 X-Ray Cervical Spine Complete 5 view (In process)    Per ED physician, pt's cervical spine XR results NAF                        I have personally reviewed the patients labs, imaging, ekg and discussed the patient case in detail with the Er provider    Assessment/Plan:     * Acute encephalopathy    Related to meds and high blood pressure suspected.    Resume home meds to control blood pressure.   CT of head negative for any acute findings at this time.  UA negative.     Try to avoid narcotics or any sedated or mood altering medications.    Neuro checks.    Consider further diagnostic testing such as MRI, ultrasound bilateral carotids, echo  Pending course.    Consider Neurology consult pending course.  Further evaluation/diagnostics/interventions/consults pending course         Neck pain    CT of neck negative for any acute findings per virtual read, CT of head negative for any acute findings per virtual read.    No meningeal signs on exam.  No WBC, afebrile.  Infectious process not suspected.    Goal for better control the BP.  Consider ultrasound bilateral carotids and are further imaging pending course.    Correct electrolyte abnormalities and acute on chronic renal failure.    Tylenol   P.r.n.   try to avoid any narcotics for altering medications, avoid NSAIDs.  Consider dose of steroid to assist with pain pending course  Further evaluation/diagnostics/interventions/consults pending course        Hypomagnesemia    Replace.  Monitor.      Acute renal failure superimposed on chronic kidney disease    Patient wife endorses decreased appetite and oral intake at home.  IV bolus given in ER.  Repeat labs this morning.  Continue gentle IV hydration    patient has not been taking his medications at home as prescribed.  Blood pressure elevated.   Nitropaste and assess response, resume home meds  For Bp control.   check chest x-ray and abdominal KUB.  Consider checking ultrasound of kidneys pending course.  Further evaluation/diagnostics/interventions/consults pending course         History of CVA (cerebrovascular accident)    PT/OT eval.  Social work consult.  Supportive care.  CT of head today no acute findings.  Neuro checks monitor.  Further evaluation/diagnostics/interventions/consults pending course         Essential hypertension    Nitropaste to chest wall this time.  Resume home meds.    Patient and wife  encouraged to follow plan of care and medication regimens.     debility  Pt/ot eval, dietary consult, supportive care, social work consult.     VTE Risk Mitigation (From admission, onward)    Scd/seth  Heparin.         ADVANCE DIRECTIVES: The topic of advance directives was discussed with the patient and wife at bedside.patient wishes to be full code.      **Portions of this note has been dictated using speech recognition software, M*Modal Fluency Direct; although, time has been taken to proof read and revise it may still contain misspellings, grammatical and or other errors.*            Aurelio Pina, NP  Department of Hospital Medicine   [facility] -

## 2019-03-27 NOTE — PROGRESS NOTES
[facility] Hospital Medicine  Progress Note    Patient Name: Kodi Ribeiro  MRN: [id number]  Patient Class: IP- Inpatient   Admission Date: 3/27/2019  Length of Stay: 0 days  Attending Physician: Scooter Wild MD  Primary Care Provider: Norma Nuñez MD        Subjective:     Principal Problem:Acute encephalopathy    HPI:   69-year-old male patient with extensive medical history presents today with complaint of neck pain that started over the weekend.  Patient reports he was seen in the emergency room a couple days ago was given pain medication and muscle relaxer and discharged home.  Patient reports that treatment with outpatient medications not effective and continues to complain of this pain.  Range of motion of neck worsens.  Associated symptoms include some confusion and weakness endorsed by patient's wife at bedside.  Additionally patient's wife reports patient has not been taking his regular medicines over the last couple of days because she was worried only about his pain and treating that and forgot about his other medicines.  Patient was evaluated in the emergency room.   Significant labs creatinine of 4.8 and BUN of 4.2.  Per emergency room note  No acute findings on CT of cervical spine and CT head.   Hospital Medicine consult.  Patient placed in observation.    Hospital Course:  3/27  Patient admitted to hospital with CC Neck Pain. Patient placed on BS abx. Concern is for meningitis as patient continues with AMS, febrile episodes and c/o Neck Pain 10/10. Family at bedside. ID consulted. Patient covered broadly. Patient unable to obtain LP per IR due to his last receiving plavix on Sunday.  Dr Curtis has agreed to do LP Thursday. CT head negative and MRI unable to obtain due to patient uncooperativity / claustrophobia per family. Patient was responding to query correctly at time of exam. He was able to tell me where he is, the year, and the president. Family reports this is an  improvement from earlier today. POC discussed in detail.    Interval History: Mild Improvement in MS    Review of Systems   Unable to perform ROS: Mental status change   Constitutional: Positive for activity change and fatigue.   Neurological: Positive for weakness.   Psychiatric/Behavioral: Positive for agitation and confusion.     Objective:     Vital Signs (Most Recent):  Temp: (!) 101.2 °F (38.4 °C) (03/27/19 1615)  Pulse: 96 (03/27/19 1615)  Resp: 18 (03/27/19 1615)  BP: (!) 156/73 (03/27/19 1615)  SpO2: (!) 93 % (03/27/19 1615) Vital Signs (24h Range):  Temp:  [97.8 °F (36.6 °C)-101.2 °F (38.4 °C)] 101.2 °F (38.4 °C)  Pulse:  [] 96  Resp:  [13-21] 18  SpO2:  [92 %-98 %] 93 %  BP: (143-193)/(55-79) 156/73     Weight: 87.1 kg (192 lb)  Body mass index is 25.33 kg/m².    Intake/Output Summary (Last 24 hours) at 3/27/2019 1734  Last data filed at 3/27/2019 1200  Gross per 24 hour   Intake 776.67 ml   Output 600 ml   Net 176.67 ml      Physical Exam   Constitutional: He appears well-developed and well-nourished.   HENT:   Head: Normocephalic and atraumatic.   Eyes: Pupils are equal, round, and reactive to light. EOM are normal.   Neck: Normal range of motion. No JVD present. Muscular tenderness present. Neck rigidity present.   Cardiovascular: Regular rhythm and intact distal pulses. Tachycardia present.   Murmur heard.   Systolic murmur is present with a grade of 3/6.  Pulmonary/Chest: Effort normal. No respiratory distress. He has no wheezes. He has rhonchi in the right middle field.   Abdominal: Soft. Bowel sounds are normal.   Musculoskeletal: Normal range of motion. He exhibits no deformity.   Neurological: He is alert. He has normal reflexes.   Skin: Skin is warm and dry. Capillary refill takes 2 to 3 seconds.   Nursing note and vitals reviewed.      Significant Labs:   ABGs: No results for input(s): PH, PCO2, HCO3, POCSATURATED, BE, TOTALHB, COHB, METHB, O2HB, POCFIO2 in the last 48 hours.  Blood  Culture: No results for input(s): LABBLOO in the last 48 hours.  BMP:   Recent Labs   Lab 03/27/19  0118 03/27/19  0547   * 134*   * 134*   K 4.0 4.0    111*   CO2 10* 10*   BUN 62* 61*   CREATININE 4.8* 4.2*   CALCIUM 8.3* 8.0*   MG 1.2*  --      CBC:   Recent Labs   Lab 03/27/19  0118 03/27/19  0547   WBC 9.73 8.19   HGB 10.0* 9.5*   HCT 30.1* 29.3*    153     CMP:   Recent Labs   Lab 03/27/19  0118 03/27/19  0547   * 134*   K 4.0 4.0    111*   CO2 10* 10*   * 134*   BUN 62* 61*   CREATININE 4.8* 4.2*   CALCIUM 8.3* 8.0*   PROT 7.5  --    ALBUMIN 2.8*  --    BILITOT 0.4  --    ALKPHOS 173*  --    AST 20  --    ALT 12  --    ANIONGAP 14 13   EGFRNONAA 11* 13*     Troponin:   Recent Labs   Lab 03/27/19  0118 03/27/19  0547 03/27/19  1126   TROPONINI 0.020 0.019 0.047*     Urine Studies:   Recent Labs   Lab 03/27/19  0156   COLORU Yellow   APPEARANCEUA Clear   PHUR 6.0   SPECGRAV 1.020   PROTEINUA 2+*   GLUCUA Trace*   KETONESU Negative   BILIRUBINUA Negative   OCCULTUA 2+*   NITRITE Negative   UROBILINOGEN Negative   LEUKOCYTESUR Negative   RBCUA 3   WBCUA 1   BACTERIA None   SQUAMEPITHEL 1   HYALINECASTS 0     All pertinent labs within the past 24 hours have been reviewed.    Significant Imaging: I have reviewed all pertinent imaging results/findings within the past 24 hours.    Assessment/Plan:      * Acute encephalopathy    Related to meds and high blood pressure suspected.    Resume home meds to control blood pressure.  CT of head negative for any acute findings at this time.  UA negative.     Try to avoid narcotics or any sedated or mood altering medications.    Neuro checks.    Consider further diagnostic testing such as MRI, ultrasound bilateral carotids, echo  Pending course.    Consider Neurology consult pending course.  Further evaluation/diagnostics/interventions/consults pending course     3/27  Metabolic Encephalopathy POA  ABX  ID on Consult  LP 3/28 per  IR  Broad Abx  Viral Coverage  Monitor      Debility    Pt/ot eval, dietary consult, supportive care, social work consult.     Neck pain    CT of neck negative for any acute findings per virtual read, CT of head negative for any acute findings per virtual read.    No meningeal signs on exam.  No WBC, afebrile.  Infectious process not suspected.    Goal for better control the BP.  Consider ultrasound bilateral carotids and are further imaging pending course.    Correct electrolyte abnormalities and acute on chronic renal failure.    Tylenol   P.r.n.   try to avoid any narcotics for altering medications, avoid NSAIDs.  Consider dose of steroid to assist with pain pending course    Trend troponins and consider further cardiac workup pending course.  Further evaluation/diagnostics/interventions/consults pending course     3/26  LP plan in progress  Consider MRI Head / Neck with sedation  CT Negative       Hypomagnesemia    Replace.  Monitor.      Acute renal failure superimposed on chronic kidney disease    Patient wife endorses decreased appetite and oral intake at home.  IV bolus given in ER.  Repeat labs this morning.  Continue gentle IV hydration    patient has not been taking his medications at home as prescribed.  Blood pressure elevated.   Nitropaste and assess response, resume home meds  For Bp control.   check chest x-ray and abdominal KUB.  Consider checking ultrasound of kidneys pending course.  Further evaluation/diagnostics/interventions/consults pending course     3/27  Improving with fluids  Monitor  Cr now 4.2        History of CVA (cerebrovascular accident)    PT/OT eval.  Social work consult.  Supportive care.  CT of head today no acute findings.  Neuro checks monitor.  Further evaluation/diagnostics/interventions/consults pending course         Essential hypertension    Nitropaste to chest wall this time.  Resume home meds.    Patient and wife encouraged to follow plan of care and medication  regimens.        VTE Risk Mitigation (From admission, onward)        Ordered     heparin (porcine) injection 5,000 Units  Every 8 hours      03/27/19 0518     Place MOISES hose  Until discontinued      03/27/19 0518     Place sequential compression device  Until discontinued      03/27/19 0518              Scooter Wild MD  Department of Hospital Medicine   [facility]

## 2019-03-27 NOTE — ASSESSMENT & PLAN NOTE
Patient wife endorses decreased appetite and oral intake at home.  IV bolus given in ER.  Repeat labs this morning.  Continue gentle IV hydration    patient has not been taking his medications at home as prescribed.  Blood pressure elevated.   Nitropaste and assess response, resume home meds  For Bp control.   check chest x-ray and abdominal KUB.  Consider checking ultrasound of kidneys pending course.  Further evaluation/diagnostics/interventions/consults pending course

## 2019-03-27 NOTE — ASSESSMENT & PLAN NOTE
Related to meds and high blood pressure suspected.    Resume home meds to control blood pressure.  CT of head negative for any acute findings at this time.  UA negative.     Try to avoid narcotics or any sedated or mood altering medications.    Neuro checks.    Consider further diagnostic testing such as MRI, ultrasound bilateral carotids, echo  Pending course.    Consider Neurology consult pending course.  Further evaluation/diagnostics/interventions/consults pending course     3/27  Metabolic Encephalopathy POA  ABX  ID on Consult  LP 3/28 per IR  Broad Abx  Viral Coverage  Monitor

## 2019-03-27 NOTE — CONSULTS
"  [facility]   Adult Nutrition  Consult Note    SUMMARY     Recommendations    Recommendation: 1. ST eval. 2. When medically able, ADAT to Cardiac Renal w/ consistency per ST. 3. If unable to adv diet and TF warranted rec Novasource @ 40 ml/hr to provide 1920 kcal, 86 g protein and 688 ml free water. 4. Will continue to monitor.   Goals: Meet > 85 % EEN/EPN while admitted  Nutrition Goal Status: new  Communication of RD Recs: (POc, sticky note)    Reason for Assessment    Reason For Assessment: consult   Dx:  1. Acute renal failure superimposed on chronic kidney disease, unspecified CKD stage, unspecified acute renal failure type    2. Altered mental status    3. Neck pain, bilateral    4. MARCELA (acute kidney injury)    5. Acute encephalopathy      Hx: Anemia, Diverticulosis, CAD, GERD, HLD, HTN, Stroke.     General info comments:Pt NPO. Pt failed the nsg dysphagia screen. NFPE not performed d/t pt w/ other healthcare providers at time of visit. Will complete at f/u.   Discharge plan: pending medical course.       Nutrition Risk Screen    Nutrition Risk Screen: no indicators present    Nutrition/Diet History    Spiritual, Cultural Beliefs, Quaker Practices, Values that Affect Care: no    Anthropometrics    Temp: 99.2 °F (37.3 °C)  Height Method: Stated  Height: 6' 1" (185.4 cm)  Height (inches): 73 in  Weight Method: Bed Scale  Weight: 87.1 kg (192 lb)  Weight (lb): 192 lb  Ideal Body Weight (IBW), Male: 184 lb  % Ideal Body Weight, Male (lb): 104.35 lb  BMI (Calculated): 25.4  BMI Grade: 25 - 29.9 - overweight       Lab/Procedures/Meds    Pertinent Labs Reviewed: reviewed  BMP  Lab Results   Component Value Date     (L) 03/27/2019    K 4.0 03/27/2019     (H) 03/27/2019    CO2 10 (L) 03/27/2019    BUN 61 (H) 03/27/2019    CREATININE 4.2 (H) 03/27/2019    CALCIUM 8.0 (L) 03/27/2019    ANIONGAP 13 03/27/2019    ESTGFRAFRICA 15 (A) 03/27/2019    EGFRNONAA 13 (A) 03/27/2019     Lab Results "   Component Value Date    CALCIUM 8.0 (L) 03/27/2019    PHOS 3.0 03/27/2019     Lab Results   Component Value Date    ALBUMIN 2.8 (L) 03/27/2019     No results for input(s): POCTGLUCOSE in the last 24 hours.    Pertinent Medications Reviewed: reviewed    Physical Findings/Assessment     skin: wound     Estimated/Assessed Needs    Weight Used For Calorie Calculations: 87.1 kg (192 lb 0.3 oz)  Energy Calorie Requirements (kcal): 2020  Energy Need Method: Cross-St Jeor(x1.2)  Protein Requirements: 87 -104 g   Weight Used For Protein Calculations: 87.1 kg (192 lb 0.3 oz)     Estimated Fluid Requirement Method: RDA Method(or per MD)  RDA Method (mL): 2020         Nutrition Prescription Ordered    Nutrition Order Comments: NPO    Evaluation of Received Nutrient/Fluid Intake       % Intake of Estimated Energy Needs: 0 - 25 %  % Meal Intake: NPO    Nutrition Risk      2xweekly    Assessment and Plan     Nutrition Problem  Inadequate energy intake     Related to (etiology):   NPO status    Signs and Symptoms (as evidenced by):   Meeting < 85 % EEN/EPN    Interventions/Recommendations (treatment strategy):  See above     Nutrition Diagnosis Status:   New        Monitor and Evaluation    Food and Nutrient Intake: energy intake  Food and Nutrient Adminstration: diet order  Anthropometric Measurements: weight  Biochemical Data, Medical Tests and Procedures: electrolyte and renal panel, glucose/endocrine profile  Nutrition-Focused Physical Findings: overall appearance, skin     Malnutrition Assessment           to be completed at f/u                            Nutrition Follow-Up    RD Follow-up?: Yes

## 2019-03-27 NOTE — PLAN OF CARE
Problem: Adult Inpatient Plan of Care  Goal: Plan of Care Review  Outcome: Ongoing (interventions implemented as appropriate)  Patient free from harm, IV Fluids/ABX continue, no falls, family remains at bedside, pt remains NPO, telemetry monitor with pulse 106-113, call bell in reach, SR X2, bed alarm in use, bed low and locked, no acute distress noted.

## 2019-03-27 NOTE — ASSESSMENT & PLAN NOTE
Has MARCELA on CKD stage 4.  MARCELA most likely due to XS use of ibuprofen.  On some days,per wife, takes 9 tablets !  Analgesic nephropathy  Strongly advised pt to quit NSAIds/ibuprofen.  Risk of kidney failure and needing dialysis sooner than later discussed    Also,elevated BP, accelerated HTN likely contributing to renal failure  Was not taking the BP meds prescribed  Noted BP better controlled after starting the outpt meds  Will monitor

## 2019-03-27 NOTE — ASSESSMENT & PLAN NOTE
Patient wife endorses decreased appetite and oral intake at home.  IV bolus given in ER.  Repeat labs this morning.  Continue gentle IV hydration    patient has not been taking his medications at home as prescribed.  Blood pressure elevated.   Nitropaste and assess response, resume home meds  For Bp control.   check chest x-ray and abdominal KUB.  Consider checking ultrasound of kidneys pending course.  Further evaluation/diagnostics/interventions/consults pending course     3/27  Improving with fluids  Monitor  Cr now 4.2

## 2019-03-27 NOTE — TELEPHONE ENCOUNTER
Received panic call from Mrs. Ribeiro stating she just didn't know what to do for her , complaining of neck pain, not eating and not sure if they would be able to make appointment tomorrow in [place]

## 2019-03-27 NOTE — TELEPHONE ENCOUNTER
Returned call and advised to try and keep appointment tomorrow but if symptoms become worst by all means contact EMS.  She stated her brothers were coming to help them get to [place] in the morning and that some of his behavior was from the pain medications he received from ER on Sunday.

## 2019-03-27 NOTE — ASSESSMENT & PLAN NOTE
CT of neck negative for any acute findings per virtual read, CT of head negative for any acute findings per virtual read.    No meningeal signs on exam.  No WBC, afebrile.  Infectious process not suspected.    Goal for better control the BP.  Consider ultrasound bilateral carotids and are further imaging pending course.    Correct electrolyte abnormalities and acute on chronic renal failure.    Tylenol   P.r.n.   try to avoid any narcotics for altering medications, avoid NSAIDs.  Consider dose of steroid to assist with pain pending course    Trend troponins and consider further cardiac workup pending course.  Further evaluation/diagnostics/interventions/consults pending course     3/26  LP plan in progress  Consider MRI Head / Neck with sedation  CT Negative

## 2019-03-27 NOTE — PLAN OF CARE
"Met with patient and family. Patient was independent with adls and iadls.with assistive devices. Patient still drives and works when able. Patient works " calling [employer]" Patient has a very supportive family. Patient's wife stated that she will provide transportation upon discharge.CM to follow.  Updated white board with 's name and number. Transitional Care Folder, Discharge Planning Begins on Admission pamphlet, [facility] pamphlet, Advance Directive information given to patient along with the contact information given.Instructed patient or family to call with any questions or concerns.    Discussed accessing the [employer] via the [employer] Instant Activation. Patient declined.        [facility] - [place], [place] - [address]  [address] [address]  Phone: [phone] Fax: [phone]    [facility] [address] [facility], [place] - [address] [facility] [address]  [address]  [facility] [address]  Phone: [phone] Fax: [phone]    Norma Nuñez MD  Payor: [employer] MEDICARE / Plan: [employer] MEDICARE HMO / Product Type: Capitation /             03/27/19 1005   Discharge Assessment   Assessment Type Discharge Planning Assessment   Confirmed/corrected address and phone number on facesheet? Yes   Assessment information obtained from? Patient;Caregiver;Medical Record   Expected Length of Stay (days)   (tbd)   Communicated expected length of stay with patient/caregiver yes   Prior to hospitilization cognitive status: Alert/Oriented   Prior to hospitalization functional status: Independent;Assistive Equipment   Current cognitive status: Unable to Assess   Current Functional Status: Needs Assistance;Partially Dependent   Facility Arrived From: home   Lives With spouse   Able to Return to Prior Arrangements   (TBD)   Is patient able to care for self after discharge? Unable to determine at this time " (comments)   Who are your caregiver(s) and their phone number(s)? Laury BINNahed ( spouse ) [phone]   Patient's perception of discharge disposition home or selfcare   Readmission Within the Last 30 Days no previous admission in last 30 days   Patient currently being followed by outpatient case management? No   Patient currently receives any other outside agency services? No   Equipment Currently Used at Home walker, rolling;wheelchair;prosthesis   Do you have any problems affording any of your prescribed medications? No   Is the patient taking medications as prescribed? yes   Does the patient have transportation home? Yes   Transportation Anticipated family or friend will provide   Does the patient receive services at the [facility]? No   Discharge Plan A Home;Home with family   Discharge Plan B Home;Home with family;Home Health   DME Needed Upon Discharge  none   Patient/Family in Agreement with Plan yes

## 2019-03-27 NOTE — CONSULTS
[facility] -   Nephrology  Consult Note        Patient Name: Kodi Ribeiro  MRN: [id number]  Admission Date: 3/27/2019  Hospital Length of Stay: 0 days  Attending Provider: Scooter Wild MD   Primary Care Physician: Norma Nuñez MD  Principal Problem:Acute encephalopathy     Reason for consult: MARCELA  Referring physician: Dr Wild    Consults  Subjective:     HPI: Pt was seen and examined. H/o and chart reviewed. Pt is a 68 y/o male with ah/o of HTN and CKD stage 4, who presented with neck pain. W/u was reviewed. CT neck and head unremarkable. Pt did not taje his BP meds while experiencing neck pain. BP elevated. s Cr has worsened form baseline to 4.8, repeat 4.2. Pt's wife says that he takes ibuprofen. According to her he has taken 1-3 tablets 2-3 times a day. She actually says that on some days he has taken 9 per day. Pt has no other c/o's, no sx's of dysuria, no GI losses.    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    Late complications of amputation stump     rseolved with further amputation( MRSA then none since 2014)    Lipoma of colon     colonoscopy 2/21/2014    Myocardial infarction     per patient 2000 & 9/2012    Peripheral vascular disease     Phantom limb syndrome     patient reports only intermittent not problematic, not worsening    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    Spinal cord disease     L4L5 disc    Stroke     Tobacco dependence     resolved    Ureteral stent retained        Past Surgical History:   Procedure Laterality Date     ABDOMINAL AORTIC ANEURYSM REPAIR      ABDOMINAL AORTIC ANEURYSM REPAIR  1996/2014    AMPUTATION, LOWER LIMB      AORTA - BILATERAL FEMORAL ARTERY BYPASS GRAFT  2014    Left and right leg    CATHETERIZATION, HEART, LEFT Left 3/7/2019    Performed by [facility]riel Boone MD at [facility] CATH LAB    COLONOSCOPY N/A 2/21/2014    Performed by Isaac Tanner MD at [facility] ENDO    CORONARY ANGIOPLASTY WITH STENT PLACEMENT  2000    Three placed in heart    CREATION, BYPASS, ARTERIAL, AORTA TO FEMORAL, BILATERAL Right 3/16/2014    Performed by Stefan Jamil MD at [facility] OR    CYSTOSCOPY WITH STENT EXCHANGE/RETROGRADE PYELOGRAM Left 4/2/2015    Performed by Scooter Jin IV, MD at [facility] OR    CYSTOSCOPY WITH STENT PLACEMENT Left 5/4/2017    Performed by Scooter Jin IV, MD at [facility] OR    CYSTOSCOPY WITH STENT PLACEMENT Left 4/28/2016    Performed by Scooter Jin IV, MD at [facility] OR    CYSTOSCOPY, WITH RETROGRADE PYELOGRAM Left 5/29/2018    Performed by Scooter Jin IV, MD at [facility] OR    CYSTOSCOPY, WITH URETERAL STENT INSERTION Left 5/29/2018    Performed by Scooter Jin IV, MD at [facility] OR    CYSTOSCOPY, WITH URETERAL STENT INSERTION Left 1/23/2014    Performed by Scooter Jin IV, MD at [facility] OR    CYSTOSCOPY, WITH URETERAL STENT REMOVAL Left 5/29/2018    Performed by Scooter Jin IV, MD at [facility] OR    EGD (ESOPHAGOGASTRODUODENOSCOPY) N/A 2/21/2014    Performed by Isaac Tanner MD at [facility] ENDO    ENDARTERECTOMY-CAROTID Left 8/18/2017    Performed by [facility]il [facility] at [facility] OR    FOOT AMPUTATION THROUGH METATARSAL  1996    left    FOOT SURGERY Bilateral 1980's    per patient multiple toe amputations prior to.  partial foot amputation:first great toe then other toes     KIDNEY SURGERY  2014    per patient separation of horseshoe kidney @ time of AAA repair    LUNG LOBECTOMY Right 1970s    per patient not cancer    PYELOGRAM-RETROGRADE Left 5/4/2017    Performed by Scooter MENESES  Davin NELSON MD at [facility] OR    RESECTION-BOWEL N/A 3/16/2014    Performed by Stefan [facility]il [facility] at [facility] OR    right below knee amputation  2009 (approx)    SMALL INTESTINE SURGERY  2014    per patient partial @ time of aaa repair  not small bowel - large bowel bowel compromised byerickmarin AAAbowel    TONSILLECTOMY  1955 aprox    URETERAL STENT PLACEMENT Left     annually replaced since 2012 or so  Dr Jin       Review of patient's allergies indicates:   Allergen Reactions    Morphine Itching     Current Facility-Administered Medications   Medication Frequency    0.9%  NaCl infusion Continuous    acetaminophen tablet 650 mg Q8H PRN    [START ON 3/28/2019] amLODIPine tablet 10 mg Daily    carvedilol tablet 12.5 mg BID WM    docusate sodium capsule 100 mg BID    heparin (porcine) injection 5,000 Units Q8H    isosorbide mononitrate 24 hr tablet 60 mg Daily    micafungin 100 mg in sodium chloride 0.9 % 100 mL IVPB (ready to mix system) Q24H    pantoprazole injection 40 mg Daily    piperacillin-tazobactam 4.5 g in dextrose 5 % 100 mL IVPB (ready to mix system) Q12H    [START ON 3/31/2019] Vancomycin 1.5 gm PLACEHOLDER D5W 100 ml IVPB Q72H     Family History     Problem Relation (Age of Onset)    COPD Mother    Cancer Mother    Diabetes Daughter    Heart disease Father        Tobacco Use    Smoking status: Former Smoker     Packs/day: 1.00     Years: 15.00     Pack years: 15.00     Last attempt to quit: 1/1/2009     Years since quitting: 10.2    Smokeless tobacco: Never Used   Substance and Sexual Activity    Alcohol use: No    Drug use: No     Comment: Is on prescription opiod, no non prescribed use    Sexual activity: Not Currently     Partners: Female     Review of Systems   Constitutional: Negative.    HENT: Negative.    Respiratory: Negative.    Gastrointestinal: Negative.    Genitourinary: Negative.    Musculoskeletal: Positive for myalgias and neck pain.   Neurological: Negative.     Psychiatric/Behavioral: Negative.      Objective:     Vital Signs (Most Recent):  Temp: (!) 101.2 °F (38.4 °C) (03/27/19 1615)  Pulse: 96 (03/27/19 1615)  Resp: 18 (03/27/19 1615)  BP: (!) 156/73 (03/27/19 1615)  SpO2: (!) 93 % (03/27/19 1615)  O2 Device (Oxygen Therapy): room air (03/27/19 0800) Vital Signs (24h Range):  Temp:  [97.8 °F (36.6 °C)-101.2 °F (38.4 °C)] 101.2 °F (38.4 °C)  Pulse:  [] 96  Resp:  [13-21] 18  SpO2:  [92 %-98 %] 93 %  BP: (143-193)/(55-79) 156/73     Weight: 87.1 kg (192 lb) (03/27/19 0235)  Body mass index is 25.33 kg/m².  Body surface area is 2.12 meters squared.    I/O last 3 completed shifts:  In: 776.7 [I.V.:276.7; IV Piggyback:500]  Out: -     Physical Exam   Constitutional: He is oriented to person, place, and time. He appears well-developed and well-nourished. No distress.   HENT:   Head: Normocephalic and atraumatic.   Neck: No JVD present.   Cardiovascular: Normal rate, regular rhythm and normal heart sounds. Exam reveals no friction rub.   Pulmonary/Chest: Effort normal and breath sounds normal. He has no rales.   Abdominal: Soft. Bowel sounds are normal. He exhibits no distension. There is no tenderness. There is no guarding.   Musculoskeletal: He exhibits tenderness. He exhibits no edema.   Tense neck   Neurological: He is alert and oriented to person, place, and time.   Skin: Skin is warm and dry.   Psychiatric: He has a normal mood and affect. His behavior is normal.   Nursing note and vitals reviewed.      Significant Labs:  Reviewed  BMP  Lab Results   Component Value Date     (L) 03/27/2019    K 4.0 03/27/2019     (H) 03/27/2019    CO2 10 (L) 03/27/2019    BUN 61 (H) 03/27/2019    CREATININE 4.2 (H) 03/27/2019    CALCIUM 8.0 (L) 03/27/2019    ANIONGAP 13 03/27/2019    ESTGFRAFRICA 15 (A) 03/27/2019    EGFRNONAA 13 (A) 03/27/2019         Significant Imaging: Reviewed CT head and CT neck    Assessment/Plan:     Acute renal failure superimposed on chronic  kidney disease  Has MARCELA on CKD stage 4.  MARCELA most likely due to XS use of ibuprofen.  On some days,per wife, takes 9 tablets !  Analgesic nephropathy  Strongly advised pt to quit NSAIds/ibuprofen.  Risk of kidney failure and needing dialysis sooner than later discussed    Also,elevated BP, accelerated HTN likely contributing to renal failure  Was not taking the BP meds prescribed  Noted BP better controlled after starting the outpt meds  Will monitor      Neck pain  CT head and CT neck unremarkable  DDX: torticollis  Will defer to PCP.      Plans and recommendations:  As dicussed above  Total time spent 70 minutes including time needed to review the records, the   patient evaluation, documentation, face-to-face discussion with the patient,   more than 50% of the time was spent on coordination of care and counseling.    Level V visit.      Shirley Bella MD   Nephrology  [facility]

## 2019-03-27 NOTE — SUBJECTIVE & OBJECTIVE
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    Late complications of amputation stump     rseolved with further amputation( MRSA then none since 2014)    Lipoma of colon     colonoscopy 2/21/2014    Myocardial infarction     per patient 2000 & 9/2012    Peripheral vascular disease     Phantom limb syndrome     patient reports only intermittent not problematic, not worsening    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    Spinal cord disease     L4L5 disc    Stroke     Tobacco dependence     resolved    Ureteral stent retained        Past Surgical History:   Procedure Laterality Date    ABDOMINAL AORTIC ANEURYSM REPAIR      ABDOMINAL AORTIC ANEURYSM REPAIR  1996/2014    AMPUTATION, LOWER LIMB      AORTA - BILATERAL FEMORAL ARTERY BYPASS GRAFT  2014    Left and right leg    CATHETERIZATION, HEART, LEFT Left 3/7/2019    Performed by [facility]one [facility] at [facility] CATH LAB    COLONOSCOPY N/A 2/21/2014    Performed by Isaac Tanner MD at [facility] ENDO    CORONARY ANGIOPLASTY WITH STENT PLACEMENT  2000    Three placed in heart    CREATION, BYPASS, ARTERIAL, AORTA TO FEMORAL, BILATERAL Right 3/16/2014    Performed by Stefan Jamil MD at [facility] OR    CYSTOSCOPY WITH STENT EXCHANGE/RETROGRADE PYELOGRAM Left 4/2/2015    Performed by Scooter Jin IV, MD at [facility] OR    CYSTOSCOPY WITH STENT PLACEMENT Left 5/4/2017    Performed by Scooter Jin IV, MD at [facility] OR    CYSTOSCOPY WITH STENT PLACEMENT Left 4/28/2016    Performed by Scooter Jin IV, MD at [facility] OR     CYSTOSCOPY, WITH RETROGRADE PYELOGRAM Left 5/29/2018    [facility]    CYSTOSCOPY, WITH URETERAL STENT INSERTION Left 5/29/2018    [facility] Sc[facility]    CYSTOSCOPY, WITH URETERAL STENT INSERTION Left 1/23/2014    [facility] Sc[facility]    CYSTOSCOPY, WITH URETERAL STENT REMOVAL Left 5/29/2018    [facility]    EGD (ESOPHAGOGASTRODUODENOSCOPY) N/A 2/21/2014    [facility]    ENDARTERECTOMY-CAROTID Left 8/18/2017    [facility]    FOOT AMPUTATION THROUGH METATARSAL  1996    left    FOOT SURGERY Bilateral 1980's    per patient multiple toe amputations prior to.  partial foot amputation:first great toe then other toes     KIDNEY SURGERY  2014    per patient separation of horseshoe kidney @ time of AAA repair    LUNG LOBECTOMY Right 1970s    per patient not cancer    PYELOGRAM-RETROGRADE Left 5/4/2017    [facility] Scooter [facility]    RESECTION-BOWEL N/A 3/16/2014    [facility]    right below knee amputation  2009 (approx)    SMALL INTESTINE SURGERY  2014    per patient partial @ time of aaa repair  not small bowel - large bowel bowel compromised bythtwe AAAbowel    TONSILLECTOMY  1955 aprox    URETERAL STENT PLACEMENT Left     annually replaced since 2012 or so  Dr Jin       Review of patient's allergies indicates:   Allergen Reactions    Morphine Itching     Current Facility-Administered Medications   Medication Frequency    0.9%  NaCl infusion Continuous    acetaminophen tablet 650 mg Q8H PRN    [START ON 3/28/2019] amLODIPine tablet 10 mg Daily    carvedilol tablet 12.5 mg BID WM    docusate sodium capsule 100 mg BID    heparin (porcine) injection 5,000 Units Q8H    isosorbide mononitrate 24 hr tablet 60 mg Daily    micafungin 100 mg in  sodium chloride 0.9 % 100 mL IVPB (ready to mix system) Q24H    pantoprazole injection 40 mg Daily    piperacillin-tazobactam 4.5 g in dextrose 5 % 100 mL IVPB (ready to mix system) Q12H    [START ON 3/31/2019] Vancomycin 1.5 gm PLACEHOLDER D5W 100 ml IVPB Q72H     Family History     Problem Relation (Age of Onset)    COPD Mother    Cancer Mother    Diabetes Daughter    Heart disease Father        Tobacco Use    Smoking status: Former Smoker     Packs/day: 1.00     Years: 15.00     Pack years: 15.00     Last attempt to quit: 1/1/2009     Years since quitting: 10.2    Smokeless tobacco: Never Used   Substance and Sexual Activity    Alcohol use: No    Drug use: No     Comment: Is on prescription opiod, no non prescribed use    Sexual activity: Not Currently     Partners: Female     Review of Systems   Constitutional: Negative.    HENT: Negative.    Respiratory: Negative.    Gastrointestinal: Negative.    Genitourinary: Negative.    Musculoskeletal: Positive for myalgias and neck pain.   Neurological: Negative.    Psychiatric/Behavioral: Negative.      Objective:     Vital Signs (Most Recent):  Temp: (!) 101.2 °F (38.4 °C) (03/27/19 1615)  Pulse: 96 (03/27/19 1615)  Resp: 18 (03/27/19 1615)  BP: (!) 156/73 (03/27/19 1615)  SpO2: (!) 93 % (03/27/19 1615)  O2 Device (Oxygen Therapy): room air (03/27/19 0800) Vital Signs (24h Range):  Temp:  [97.8 °F (36.6 °C)-101.2 °F (38.4 °C)] 101.2 °F (38.4 °C)  Pulse:  [] 96  Resp:  [13-21] 18  SpO2:  [92 %-98 %] 93 %  BP: (143-193)/(55-79) 156/73     Weight: 87.1 kg (192 lb) (03/27/19 0235)  Body mass index is 25.33 kg/m².  Body surface area is 2.12 meters squared.    I/O last 3 completed shifts:  In: 776.7 [I.V.:276.7; IV Piggyback:500]  Out: -     Physical Exam   Constitutional: He is oriented to person, place, and time. He appears well-developed and well-nourished. No distress.   HENT:   Head: Normocephalic and atraumatic.   Neck: No JVD present.   Cardiovascular:  Normal rate, regular rhythm and normal heart sounds. Exam reveals no friction rub.   Pulmonary/Chest: Effort normal and breath sounds normal. He has no rales.   Abdominal: Soft. Bowel sounds are normal. He exhibits no distension. There is no tenderness. There is no guarding.   Musculoskeletal: He exhibits tenderness. He exhibits no edema.   Tense neck   Neurological: He is alert and oriented to person, place, and time.   Skin: Skin is warm and dry.   Psychiatric: He has a normal mood and affect. His behavior is normal.   Nursing note and vitals reviewed.      Significant Labs:  Reviewed  BMP  Lab Results   Component Value Date     (L) 03/27/2019    K 4.0 03/27/2019     (H) 03/27/2019    CO2 10 (L) 03/27/2019    BUN 61 (H) 03/27/2019    CREATININE 4.2 (H) 03/27/2019    CALCIUM 8.0 (L) 03/27/2019    ANIONGAP 13 03/27/2019    ESTGFRAFRICA 15 (A) 03/27/2019    EGFRNONAA 13 (A) 03/27/2019         Significant Imaging: Reviewed CT head and CT neck

## 2019-03-27 NOTE — PROGRESS NOTES
Vancomycin Consult Note/ D. Ourso :    70 y/o male with Acute encephalopathy ( r/o meningitis), neck pain, CKD,   IV ABX : Zosyn/Vanc. CrCl 18.5, SCr 4.2, WBC 8.19, Tmax 98.5.   Vanc 1500mg x 1 dose given ED.   Vancomycin random ordered with 3/28 Am labs.   Vanc placeholder dose entered.     Fina Cody RPh. 3/27/2019 11:50 AM

## 2019-03-27 NOTE — ASSESSMENT & PLAN NOTE
PT/OT eval.  Social work consult.  Supportive care.  CT of head today no acute findings.  Neuro checks monitor.  Further evaluation/diagnostics/interventions/consults pending course

## 2019-03-27 NOTE — CHAPLAIN
Initial visit with patient and his spouse.  Patient was not able to communicate but I took time to visit with his wife.  Patient's spouse helped me to understand his condition but shared with me that what is happening now is different then other times.  I asked if she was alone in all that they are going through.  She said that she has support from her granddaughter. She took a little more time to reflect on different life stories.  I could tell that the patient's spouse was a little tired and trying to rest herself.  I closed out our visit at that time letting her know that if she has any spiritual or emotional needs come up to please let me know.  Spiritual care remains available as needed.    Chaplain Samson Diaz M.Div., [facility]

## 2019-03-27 NOTE — PLAN OF CARE
Problem: Adult Inpatient Plan of Care  Goal: Plan of Care Review  Outcome: Ongoing (interventions implemented as appropriate)  Recommendations     Recommendation: 1. ST eval. 2. When medically able, ADAT to Cardiac Renal w/ consistency per ST. 3. If unable to adv diet and TF warranted rec Novasource @ 40 ml/hr to provide 1920 kcal, 86 g protein and 688 ml free water. 4. Will continue to monitor.   Goals: Meet > 85 % EEN/EPN while admitted  Nutrition Goal Status: new  Communication of RD Recs: (POc, sticky note)

## 2019-03-27 NOTE — SUBJECTIVE & OBJECTIVE
Interval History: Mild Improvement in MS    Review of Systems   Unable to perform ROS: Mental status change   Constitutional: Positive for activity change and fatigue.   Neurological: Positive for weakness.   Psychiatric/Behavioral: Positive for agitation and confusion.     Objective:     Vital Signs (Most Recent):  Temp: (!) 101.2 °F (38.4 °C) (03/27/19 1615)  Pulse: 96 (03/27/19 1615)  Resp: 18 (03/27/19 1615)  BP: (!) 156/73 (03/27/19 1615)  SpO2: (!) 93 % (03/27/19 1615) Vital Signs (24h Range):  Temp:  [97.8 °F (36.6 °C)-101.2 °F (38.4 °C)] 101.2 °F (38.4 °C)  Pulse:  [] 96  Resp:  [13-21] 18  SpO2:  [92 %-98 %] 93 %  BP: (143-193)/(55-79) 156/73     Weight: 87.1 kg (192 lb)  Body mass index is 25.33 kg/m².    Intake/Output Summary (Last 24 hours) at 3/27/2019 1734  Last data filed at 3/27/2019 1200  Gross per 24 hour   Intake 776.67 ml   Output 600 ml   Net 176.67 ml      Physical Exam   Constitutional: He appears well-developed and well-nourished.   HENT:   Head: Normocephalic and atraumatic.   Eyes: Pupils are equal, round, and reactive to light. EOM are normal.   Neck: Normal range of motion. No JVD present. Muscular tenderness present. Neck rigidity present.   Cardiovascular: Regular rhythm and intact distal pulses. Tachycardia present.   Murmur heard.   Systolic murmur is present with a grade of 3/6.  Pulmonary/Chest: Effort normal. No respiratory distress. He has no wheezes. He has rhonchi in the right middle field.   Abdominal: Soft. Bowel sounds are normal.   Musculoskeletal: Normal range of motion. He exhibits no deformity.   Neurological: He is alert. He has normal reflexes.   Skin: Skin is warm and dry. Capillary refill takes 2 to 3 seconds.   Nursing note and vitals reviewed.      Significant Labs:   ABGs: No results for input(s): PH, PCO2, HCO3, POCSATURATED, BE, TOTALHB, COHB, METHB, O2HB, POCFIO2 in the last 48 hours.  Blood Culture: No results for input(s): LABBLOO in the last 48  hours.  BMP:   Recent Labs   Lab 03/27/19  0118 03/27/19  0547   * 134*   * 134*   K 4.0 4.0    111*   CO2 10* 10*   BUN 62* 61*   CREATININE 4.8* 4.2*   CALCIUM 8.3* 8.0*   MG 1.2*  --      CBC:   Recent Labs   Lab 03/27/19  0118 03/27/19  0547   WBC 9.73 8.19   HGB 10.0* 9.5*   HCT 30.1* 29.3*    153     CMP:   Recent Labs   Lab 03/27/19  0118 03/27/19  0547   * 134*   K 4.0 4.0    111*   CO2 10* 10*   * 134*   BUN 62* 61*   CREATININE 4.8* 4.2*   CALCIUM 8.3* 8.0*   PROT 7.5  --    ALBUMIN 2.8*  --    BILITOT 0.4  --    ALKPHOS 173*  --    AST 20  --    ALT 12  --    ANIONGAP 14 13   EGFRNONAA 11* 13*     Troponin:   Recent Labs   Lab 03/27/19  0118 03/27/19  0547 03/27/19  1126   TROPONINI 0.020 0.019 0.047*     Urine Studies:   Recent Labs   Lab 03/27/19  0156   COLORU Yellow   APPEARANCEUA Clear   PHUR 6.0   SPECGRAV 1.020   PROTEINUA 2+*   GLUCUA Trace*   KETONESU Negative   BILIRUBINUA Negative   OCCULTUA 2+*   NITRITE Negative   UROBILINOGEN Negative   LEUKOCYTESUR Negative   RBCUA 3   WBCUA 1   BACTERIA None   SQUAMEPITHEL 1   HYALINECASTS 0     All pertinent labs within the past 24 hours have been reviewed.    Significant Imaging: I have reviewed all pertinent imaging results/findings within the past 24 hours.

## 2019-03-27 NOTE — HPI
Pt was seen and examined. H/o and chart reviewed. Pt is a 68 y/o male with ah/o of HTN and CKD stage 4, who presented with neck pain. W/u was reviewed. CT neck and head unremarkable. Pt did not taje his BP meds while experiencing neck pain. BP elevated. s Cr has worsened form baseline to 4.8, repeat 4.2. Pt's wife says that he takes ibuprofen. According to her he has taken 1-3 tablets 2-3 times a day. She actually says that on some days he has taken 9 per day. Pt has no other c/o's, no sx's of dysuria, no GI losses.

## 2019-03-27 NOTE — PT/OT/SLP PROGRESS
"Occupational Therapy      Patient Name:  Kodi Ribeiro   MRN:  [id number]    eval initiated via chart review. Spoke Dr. Wild and ok to procede with therapy. Pt refgused and family and pt in agreement to complete therapy on later date. Family reported " I don't think you will get much out of him. " Dr. Wild informed.   Radha Armstrong, OT  3/27/2019   [id number]    "

## 2019-03-28 LAB
ANION GAP SERPL CALC-SCNC: 12 MMOL/L (ref 8–16)
BASOPHILS # BLD AUTO: 0 K/UL (ref 0–0.2)
BASOPHILS NFR BLD: 0 % (ref 0–1.9)
BUN SERPL-MCNC: 65 MG/DL (ref 8–23)
CALCIUM SERPL-MCNC: 9 MG/DL (ref 8.7–10.5)
CHLORIDE SERPL-SCNC: 113 MMOL/L (ref 95–110)
CLARITY CSF: CLEAR
CO2 SERPL-SCNC: 12 MMOL/L (ref 23–29)
COLOR CSF: COLORLESS
CREAT SERPL-MCNC: 3.6 MG/DL (ref 0.5–1.4)
DIFFERENTIAL METHOD: ABNORMAL
EOSINOPHIL # BLD AUTO: 0 K/UL (ref 0–0.5)
EOSINOPHIL NFR BLD: 0 % (ref 0–8)
ERYTHROCYTE [DISTWIDTH] IN BLOOD BY AUTOMATED COUNT: 15.2 % (ref 11.5–14.5)
EST. GFR  (AFRICAN AMERICAN): 19 ML/MIN/1.73 M^2
EST. GFR  (NON AFRICAN AMERICAN): 16 ML/MIN/1.73 M^2
GLUCOSE CSF-MCNC: 87 MG/DL (ref 40–70)
GLUCOSE SERPL-MCNC: 141 MG/DL (ref 70–110)
HCT VFR BLD AUTO: 26.5 % (ref 40–54)
HGB BLD-MCNC: 8.9 G/DL (ref 14–18)
LYMPHOCYTES # BLD AUTO: 0.3 K/UL (ref 1–4.8)
LYMPHOCYTES NFR BLD: 4 % (ref 18–48)
LYMPHOCYTES NFR CSF MANUAL: 40 % (ref 40–80)
MCH RBC QN AUTO: 29 PG (ref 27–31)
MCHC RBC AUTO-ENTMCNC: 33.6 G/DL (ref 32–36)
MCV RBC AUTO: 86 FL (ref 82–98)
MONOCYTES # BLD AUTO: 0.2 K/UL (ref 0.3–1)
MONOCYTES NFR BLD: 3.2 % (ref 4–15)
MONOS+MACROS NFR CSF MANUAL: 26 % (ref 15–45)
NEUTROPHILS # BLD AUTO: 6.3 K/UL (ref 1.8–7.7)
NEUTROPHILS NFR BLD: 93.2 % (ref 38–73)
NEUTROPHILS NFR CSF MANUAL: 34 % (ref 0–6)
PLATELET # BLD AUTO: 129 K/UL (ref 150–350)
PMV BLD AUTO: 9.4 FL (ref 9.2–12.9)
POTASSIUM SERPL-SCNC: 3.9 MMOL/L (ref 3.5–5.1)
PROT CSF-MCNC: 85 MG/DL (ref 15–40)
RBC # BLD AUTO: 3.07 M/UL (ref 4.6–6.2)
RBC # CSF: 8 /CU MM
SODIUM SERPL-SCNC: 137 MMOL/L (ref 136–145)
SPECIMEN VOL CSF: 5 ML
VANCOMYCIN SERPL-MCNC: 16.1 UG/ML
WBC # BLD AUTO: 6.8 K/UL (ref 3.9–12.7)
WBC # CSF: 360 /CU MM (ref 0–5)

## 2019-03-28 PROCEDURE — 63600175 PHARM REV CODE 636 W HCPCS: Mod: HCNC | Performed by: NURSE PRACTITIONER

## 2019-03-28 PROCEDURE — 96372 THER/PROPH/DIAG INJ SC/IM: CPT | Mod: HCNC

## 2019-03-28 PROCEDURE — 80202 ASSAY OF VANCOMYCIN: CPT | Mod: HCNC

## 2019-03-28 PROCEDURE — 94640 AIRWAY INHALATION TREATMENT: CPT | Mod: HCNC

## 2019-03-28 PROCEDURE — 94761 N-INVAS EAR/PLS OXIMETRY MLT: CPT | Mod: HCNC

## 2019-03-28 PROCEDURE — 89051 BODY FLUID CELL COUNT: CPT | Mod: HCNC

## 2019-03-28 PROCEDURE — 97535 SELF CARE MNGMENT TRAINING: CPT | Mod: HCNC

## 2019-03-28 PROCEDURE — 25000003 PHARM REV CODE 250: Mod: HCNC | Performed by: INTERNAL MEDICINE

## 2019-03-28 PROCEDURE — 63600175 PHARM REV CODE 636 W HCPCS: Mod: HCNC | Performed by: INTERNAL MEDICINE

## 2019-03-28 PROCEDURE — 99900035 HC TECH TIME PER 15 MIN (STAT): Mod: HCNC

## 2019-03-28 PROCEDURE — 87798 DETECT AGENT NOS DNA AMP: CPT | Mod: HCNC

## 2019-03-28 PROCEDURE — 25000003 PHARM REV CODE 250: Mod: HCNC | Performed by: NURSE PRACTITIONER

## 2019-03-28 PROCEDURE — 94799 UNLISTED PULMONARY SVC/PX: CPT | Mod: HCNC

## 2019-03-28 PROCEDURE — 92610 EVALUATE SWALLOWING FUNCTION: CPT | Mod: HCNC

## 2019-03-28 PROCEDURE — 87529 HSV DNA AMP PROBE: CPT | Mod: HCNC

## 2019-03-28 PROCEDURE — 36415 COLL VENOUS BLD VENIPUNCTURE: CPT | Mod: HCNC

## 2019-03-28 PROCEDURE — 86592 SYPHILIS TEST NON-TREP QUAL: CPT | Mod: HCNC

## 2019-03-28 PROCEDURE — 87070 CULTURE OTHR SPECIMN AEROBIC: CPT | Mod: HCNC

## 2019-03-28 PROCEDURE — 84157 ASSAY OF PROTEIN OTHER: CPT | Mod: HCNC

## 2019-03-28 PROCEDURE — 87205 SMEAR GRAM STAIN: CPT | Mod: HCNC

## 2019-03-28 PROCEDURE — 99233 PR SUBSEQUENT HOSPITAL CARE,LEVL III: ICD-10-PCS | Mod: HCNC,,, | Performed by: INTERNAL MEDICINE

## 2019-03-28 PROCEDURE — 99233 SBSQ HOSP IP/OBS HIGH 50: CPT | Mod: HCNC,,, | Performed by: INTERNAL MEDICINE

## 2019-03-28 PROCEDURE — 21400001 HC TELEMETRY ROOM: Mod: HCNC

## 2019-03-28 PROCEDURE — C9113 INJ PANTOPRAZOLE SODIUM, VIA: HCPCS | Mod: HCNC | Performed by: NURSE PRACTITIONER

## 2019-03-28 PROCEDURE — 85025 COMPLETE CBC W/AUTO DIFF WBC: CPT | Mod: HCNC

## 2019-03-28 PROCEDURE — 97165 OT EVAL LOW COMPLEX 30 MIN: CPT | Mod: HCNC

## 2019-03-28 PROCEDURE — 25000242 PHARM REV CODE 250 ALT 637 W/ HCPCS: Mod: HCNC | Performed by: NURSE PRACTITIONER

## 2019-03-28 PROCEDURE — 80048 BASIC METABOLIC PNL TOTAL CA: CPT | Mod: HCNC

## 2019-03-28 PROCEDURE — 82945 GLUCOSE OTHER FLUID: CPT | Mod: HCNC

## 2019-03-28 RX ADMIN — CARVEDILOL 12.5 MG: 12.5 TABLET, FILM COATED ORAL at 04:03

## 2019-03-28 RX ADMIN — CARVEDILOL 12.5 MG: 12.5 TABLET, FILM COATED ORAL at 08:03

## 2019-03-28 RX ADMIN — CEFTRIAXONE 2 G: 2 INJECTION, SOLUTION INTRAVENOUS at 05:03

## 2019-03-28 RX ADMIN — AMPICILLIN 4000 MG: 2 INJECTION, POWDER, FOR SOLUTION INTRAVENOUS at 10:03

## 2019-03-28 RX ADMIN — PANTOPRAZOLE SODIUM 40 MG: 40 INJECTION, POWDER, LYOPHILIZED, FOR SOLUTION INTRAVENOUS at 09:03

## 2019-03-28 RX ADMIN — VANCOMYCIN HYDROCHLORIDE 1250 MG: 100 INJECTION, POWDER, LYOPHILIZED, FOR SOLUTION INTRAVENOUS at 10:03

## 2019-03-28 RX ADMIN — AMPICILLIN 4000 MG: 2 INJECTION, POWDER, FOR SOLUTION INTRAVENOUS at 01:03

## 2019-03-28 RX ADMIN — METHYLPREDNISOLONE SODIUM SUCCINATE 80 MG: 40 INJECTION, POWDER, FOR SOLUTION INTRAMUSCULAR; INTRAVENOUS at 01:03

## 2019-03-28 RX ADMIN — GUAIFENESIN 600 MG: 600 TABLET, EXTENDED RELEASE ORAL at 08:03

## 2019-03-28 RX ADMIN — SODIUM ACETATE: 164 INJECTION, SOLUTION, CONCENTRATE INTRAVENOUS at 04:03

## 2019-03-28 RX ADMIN — AMPICILLIN 4000 MG: 2 INJECTION, POWDER, FOR SOLUTION INTRAVENOUS at 05:03

## 2019-03-28 RX ADMIN — ACYCLOVIR SODIUM 870 MG: 1000 INJECTION, SOLUTION INTRAVENOUS at 09:03

## 2019-03-28 RX ADMIN — HEPARIN SODIUM 5000 UNITS: 5000 INJECTION, SOLUTION INTRAVENOUS; SUBCUTANEOUS at 09:03

## 2019-03-28 RX ADMIN — IPRATROPIUM BROMIDE AND ALBUTEROL SULFATE 3 ML: .5; 3 SOLUTION RESPIRATORY (INHALATION) at 12:03

## 2019-03-28 RX ADMIN — AMLODIPINE BESYLATE 10 MG: 10 TABLET ORAL at 08:03

## 2019-03-28 RX ADMIN — CEFTRIAXONE 2 G: 2 INJECTION, SOLUTION INTRAVENOUS at 07:03

## 2019-03-28 RX ADMIN — DOCUSATE SODIUM 100 MG: 100 CAPSULE, LIQUID FILLED ORAL at 08:03

## 2019-03-28 RX ADMIN — IPRATROPIUM BROMIDE AND ALBUTEROL SULFATE 3 ML: .5; 3 SOLUTION RESPIRATORY (INHALATION) at 08:03

## 2019-03-28 RX ADMIN — ISOSORBIDE MONONITRATE 60 MG: 60 TABLET, EXTENDED RELEASE ORAL at 08:03

## 2019-03-28 NOTE — NURSING
Pt called stating he was feeling SOB. Coughing up dark green mucus. Lungs sound clear, O2=95% on RA, RR=22, other VSS. Patient coughing and hurts him more to cough. Pt spouse requesting that he receive a breathing treatment or oral meds to help him clear the mucus. Sent secure chat to [facility].       Pt coughed up large amount of mucus and possible piece of food coated in mucus. Still complains of worsened pain.Troponin was elevated during day shift also. Notified [facility].

## 2019-03-28 NOTE — PROGRESS NOTES
[facility] -   Nephrology  Progress Note    Patient Name: Kodi Ribeiro  MRN: [id number]  Admission Date: 3/27/2019  Hospital Length of Stay: 1 days  Attending Provider: Stefan Leary MD   Primary Care Physician: Norma Nuñez MD  Principal Problem:Acute encephalopathy    Subjective:     HPI: Pt was seen and examined. H/o and chart reviewed. Pt is a 70 y/o male with ah/o of HTN and CKD stage 4, who presented with neck pain. W/u was reviewed. CT neck and head unremarkable. Pt did not taje his BP meds while experiencing neck pain. BP elevated. s Cr has worsened form baseline to 4.8, repeat 4.2. Pt's wife says that he takes ibuprofen. According to her he has taken 1-3 tablets 2-3 times a day. She actually says that on some days he has taken 9 per day. Pt has no other c/o's, no sx's of dysuria, no GI losses.    Interval History: Pt was seen and examined. No new c/o's, no new events, feels better, neck more relaxed.    Review of patient's allergies indicates:   Allergen Reactions    Morphine Itching     Current Facility-Administered Medications   Medication Frequency    0.9%  NaCl infusion Continuous    acetaminophen tablet 650 mg Q8H PRN    acyclovir (ZOVIRAX) 870 mg in dextrose 5 % 250 mL IVPB Daily    albuterol-ipratropium 2.5 mg-0.5 mg/3 mL nebulizer solution 3 mL Q8H    amLODIPine tablet 10 mg Daily    ampicillin (OMNIPEN) 4 g in  mL EXTENDED INFUSION Q8H    carvedilol tablet 12.5 mg BID WM    cefTRIAXone (ROCEPHIN) 2 g in dextrose 5 % 50 mL IVPB Q12H    docusate sodium capsule 100 mg BID    guaiFENesin 12 hr tablet 600 mg BID    heparin (porcine) injection 5,000 Units Q8H    isosorbide mononitrate 24 hr tablet 60 mg Daily    pantoprazole injection 40 mg Daily    vancomycin (VANCOCIN) 1,250 mg in dextrose 5 % 250 mL IVPB Once    [START ON 3/31/2019] Vancomycin 1.5 gm PLACEHOLDER D5W 100 ml IVPB Q72H       Objective:     Vital Signs (Most Recent):  Temp: 96.9 °F (36.1 °C)  (03/28/19 0832)  Pulse: 76 (03/28/19 1100)  Resp: 18 (03/28/19 0838)  BP: 137/62 (03/28/19 0832)  SpO2: 97 % (03/28/19 0838)  O2 Device (Oxygen Therapy): room air (03/28/19 0838) Vital Signs (24h Range):  Temp:  [96.9 °F (36.1 °C)-101.2 °F (38.4 °C)] 96.9 °F (36.1 °C)  Pulse:  [] 76  Resp:  [15-22] 18  SpO2:  [93 %-97 %] 97 %  BP: (132-156)/(60-73) 137/62     Weight: 87.1 kg (192 lb) (03/27/19 0235)  Body mass index is 25.33 kg/m².  Body surface area is 2.12 meters squared.    I/O last 3 completed shifts:  In: 2838.7 [I.V.:1788.7; IV Piggyback:1050]  Out: 900 [Urine:900]    Physical Exam   Constitutional: He is oriented to person, place, and time. He appears well-developed and well-nourished. No distress.   HENT:   Head: Normocephalic and atraumatic.   Neck: No JVD present.   Cardiovascular: Normal rate, regular rhythm and normal heart sounds. Exam reveals no friction rub.   Pulmonary/Chest: Effort normal and breath sounds normal. He has no rales.   Abdominal: Soft. Bowel sounds are normal. He exhibits no distension. There is no tenderness. There is no guarding.   Musculoskeletal: He exhibits tenderness. He exhibits no edema.   Tense neck   Neurological: He is alert and oriented to person, place, and time.   Skin: Skin is warm and dry.   Psychiatric: He has a normal mood and affect. His behavior is normal.   Nursing note and vitals reviewed.      Significant Labs: reviewed  BMP  Lab Results   Component Value Date     03/28/2019    K 3.9 03/28/2019     (H) 03/28/2019    CO2 12 (L) 03/28/2019    BUN 65 (H) 03/28/2019    CREATININE 3.6 (H) 03/28/2019    CALCIUM 9.0 03/28/2019    ANIONGAP 12 03/28/2019    ESTGFRAFRICA 19 (A) 03/28/2019    EGFRNONAA 16 (A) 03/28/2019     Lab Results   Component Value Date    WBC 6.80 03/28/2019    HGB 8.9 (L) 03/28/2019    HCT 26.5 (L) 03/28/2019    MCV 86 03/28/2019     (L) 03/28/2019       Significant Imaging: reviewed    Assessment/Plan:       68 y/o male with  "MARCELA on CKD stage 4:    Acute renal failure superimposed on chronic kidney disease  Has MARCELA on CKD stage 4.  s Cr stable, improving  Pt confirms high use of ibuprofen "up to 9 a day")  Analgesic nephropathy causing MARCELA  Strongly advised pt to quit NSAIds/ibuprofen.  Risk of kidney failure and needing dialysis sooner than later discussed    K normal  Metabolic acidosis  Unable to give PO bicarbonate, has not passed the swallowing study  On NS IVF's, will switch to 1/2 NS with bicarb added at low rate     Elevated BP, accelerated HTN likely contributing to renal failure  Was not taking the BP meds prescribed  BP improved, normal on re-starting BP meds   Will monitor        Neck pain  CT head and CT neck unremarkable  DDX: torticollis  Will defer to PCP.        Plans and recommendations:  As dicussed above  Will change NS to 1/2 NS with bicarb added.      Shirley Bella MD  Nephrology  [facility]  "

## 2019-03-28 NOTE — HPI
69-year-old male who was admitted with history of neck pain and fever . Since admission, he was noted to have worsening confusion . CT scan of the head and cervical spine did not show any acute abnormality.  Lab data showed - creatinine of 4.8 and BUN of 4.2. CBC is significant for left shift -with seg of 93.  Family were in the room at this time.

## 2019-03-28 NOTE — NURSING
Pt's wife has been raising 4th side rail off/on during shift. Educated about safety and pt wife says it just ends up that way. Bed alarm set. Instructed wife to please not raise all rails and call staff if pt needs assistance w/ anything.

## 2019-03-28 NOTE — PLAN OF CARE
Problem: Adult Inpatient Plan of Care  Goal: Plan of Care Review  Outcome: Ongoing (interventions implemented as appropriate)  Pt had no adverse events during shift. Pt free of falls. Call light in reach. Bed alarm set. Side rails x 3. Pain managed w/ PRN meds and ice. IVF/abx administered as ordered. NSR 80-90s on tele monitor. VSS. Chart reviewed, will continue to monitor.

## 2019-03-28 NOTE — PT/OT/SLP EVAL
Speech Language Pathology Evaluation  Bedside Swallow    Patient Name:  Kodi Ribeiro   MRN:  [id number]  Admitting Diagnosis: Acute encephalopathy    Recommendations:                 General Recommendations:  Dysphagia therapy  Diet recommendations:  Puree, Nectar Thick   Aspiration Precautions: 1 bite/sip at a time, Alternating bites/sips, Assistance with meals and Assistance with thickening liquids, HOB to 90 degrees and Small bites/sips   General Precautions: Standard,    Communication strategies:  none    History:     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    Late complications of amputation stump     rseolved with further amputation( MRSA then none since 2014)    Lipoma of colon     colonoscopy 2/21/2014    Myocardial infarction     per patient 2000 & 9/2012    Peripheral vascular disease     Phantom limb syndrome     patient reports only intermittent not problematic, not worsening    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    Spinal cord disease     L4L5 disc    Stroke     Tobacco dependence     resolved    Ureteral stent retained        Past Surgical History:   Procedure Laterality Date    ABDOMINAL AORTIC ANEURYSM REPAIR      ABDOMINAL AORTIC ANEURYSM REPAIR  1996/2014    AMPUTATION, LOWER LIMB      AORTA - BILATERAL FEMORAL ARTERY BYPASS GRAFT  2014    Left and right leg    CATHETERIZATION, HEART, LEFT Left 3/7/2019    Performed by Adriel Boone [facility] at [facility] C[facility]    COLONOSCOPY N/A 2/21/2014    Performed by Isaac MUSTAFA  MD Flores at [facility] ENDO    CORONARY ANGIOPLASTY WITH STENT PLACEMENT  2000    Three placed in heart    CREATION, BYPASS, ARTERIAL, AORTA TO FEMORAL, BILATERAL Right 3/16/2014    Performed by Stefan Jamil MD at [facility] OR    CYSTOSCOPY WITH STENT EXCHANGE/RETROGRADE PYELOGRAM Left 4/2/2015    Performed by Scooter Jin IV, MD at [facility] OR    CYSTOSCOPY WITH STENT PLACEMENT Left 5/4/2017    Performed by Scooter Jin IV, MD at [facility] OR    CYSTOSCOPY WITH STENT PLACEMENT Left 4/28/2016    Performed by Scooter Jin IV, MD at [facility] OR    CYSTOSCOPY, WITH RETROGRADE PYELOGRAM Left 5/29/2018    Performed by Scooter Jin IV, MD at [facility] OR    CYSTOSCOPY, WITH URETERAL STENT INSERTION Left 5/29/2018    Performed by Scooter Jin IV, MD at [facility] OR    CYSTOSCOPY, WITH URETERAL STENT INSERTION Left 1/23/2014    Performed by Scooter Jin IV, MD at [facility] OR    CYSTOSCOPY, WITH URETERAL STENT REMOVAL Left 5/29/2018    Performed by Scooter Jin IV, MD at [facility] OR    EGD (ESOPHAGOGASTRODUODENOSCOPY) N/A 2/21/2014    Performed by Isaac Tanner MD at [facility] ENDO    ENDARTERECTOMY-CAROTID Left 8/18/2017    Performed by Stefan Jamil MD at [facility] OR    FOOT AMPUTATION THROUGH METATARSAL  1996    left    FOOT SURGERY Bilateral 1980's    per patient multiple toe amputations prior to.  partial foot amputation:first great toe then other toes     KIDNEY SURGERY  2014    per patient separation of horseshoe kidney @ time of AAA repair    LUNG LOBECTOMY Right 1970s    per patient not cancer    PYELOGRAM-RETROGRADE Left 5/4/2017    Performed by Scooter Jin IV, MD at [facility] OR    RESECTION-BOWEL N/A 3/16/2014    Performed by Stefan Jamil [facility] at [facility] OR    right below knee amputation  2009 (approx)    SMALL INTESTINE SURGERY  2014    per patient partial @ time of aaa repair  not small bowel - large bowel bowel compromised bythtwe AAAbowel    TONSILLECTOMY  1955 aprox    URETERAL STENT  PLACEMENT Left     annually replaced since 2012 or so  Dr Jin       Social History: Patient lives with his wife.    Prior Intubation HX:      Modified Barium Swallow:     Chest X-Rays:     Prior diet: regular    Occupation/hobbies/homemaking:     Subjective     Pt cooperative and motivated  Patient goals: to eat    Pain/Comfort:  · Pain Rating 1: 0/10  · Pain Rating Post-Intervention 1: 0/10    Objective:     Oral Musculature Evaluation  · Oral Musculature: general weakness  · Dentition: edentulous, teeth in poor condition, scattered dentition  · Mucosal Quality: dry    Bedside Swallow Eval:   Consistencies Assessed:  · Thin, nectar, puree     Oral Phase:   · Dry mouth    Pharyngeal Phase:   · coughing/choking  · decreased hyolaryngeal excursion to palpation    Compensatory Strategies  · None    Treatment:     Assessment:     Kodi Ribeiro is a 69 y.o. male with an SLP diagnosis of Dysphagia.  He presents with impaired swallow with s/s of aspiration with thin liquids and reduced laryngeal elevation.    Goals:   Multidisciplinary Problems     SLP Goals        Problem: SLP Goal    Goal Priority Disciplines Outcome   SLP Goal     SLP    Description:  1. Ongoing swallow assessment for safety of least restrictive diet.  2. Pt will complete oral motor/pharyngeal/laryngeal ex x 15 each with mod cues to improve swallow function.                     Plan:     · Patient to be seen:  3 x/week   · Plan of Care expires:  04/04/19  · Plan of Care reviewed with:  patient, family   · SLP Follow-Up:  Yes       Discharge recommendations:      Barriers to Discharge:  None    Time Tracking:     SLP Treatment Date:   03/28/19  Speech Start Time:  1128  Speech Stop Time:  1143     Speech Total Time (min):  15 min    Billable Minutes: Eval Swallow and Oral Function 15    Alanis Arreguin CCC-SLP  03/28/2019

## 2019-03-28 NOTE — PROGRESS NOTES
Vancomycin Progress :  Meningitis protocol, Ampicillin/zovirax/rocephin/Vanc .   CrCl 21.5, SCr 3.6 - down from 4.2, WBC 6.8, Tmax 101.2,   Vancomycin  random = 16.1, Dose 1250 mg today 9:30,   random ordered 12 later post dose .   Fina Cody [facility]. 3/28/2019 8:24 AM

## 2019-03-28 NOTE — ASSESSMENT & PLAN NOTE
3/27- with fever , meningitis is of concern, will plan for LP, will start empiric therapy for meningitis with IV Rocephin 2gram bid, vancomycin, ampicillin and acyclovir .  Will use LP to adjust therapy .  Send message to Radiology -Dr Curtis about LP in AM as Plavix was taken over the last weekend.

## 2019-03-28 NOTE — CONSULTS
[facility]  Infectious Disease  Consult Note    Patient Name: Kodi Ribeiro  MRN: [id number]  Admission Date: 3/27/2019  Hospital Length of Stay: 1 days  Attending Physician: Stefan Leary MD  Primary Care Provider: Norma Nuñez MD     Isolation Status: No active isolations    Patient information was obtained from spouse/SO and ER records.      Consults  Assessment/Plan:     * Acute encephalopathy  3/27- with fever , meningitis is of concern, will plan for LP, will start empiric therapy for meningitis with IV Rocephin 2gram bid, vancomycin, ampicillin and acyclovir .  Will use LP to adjust therapy .  Send message to Radiology -Dr Curtis about LP in AM as Plavix was taken over the last weekend.      Hypomagnesemia  3/27- will replete and will monitor closely     Acute renal failure superimposed on chronic kidney disease  3/27- will continue nephrology follow up , avoid nephrotoxic meds..          Thank you for your consult. I will follow-up with patient. Please contact us if you have any additional questions.  (late entry note)  Lavell Waldrop MD  Infectious Disease  [facility]    Subjective:     Principal Problem: Acute encephalopathy    HPI: 69-year-old male who was admitted with history of neck pain and fever . Since admission, he was noted to have worsening confusion . CT scan of the head and cervical spine did not show any acute abnormality.  Lab data showed - creatinine of 4.8 and BUN of 4.2. CBC is significant for left shift -with seg of 93.  Family were in the room at this time.    Past Medical History:   Diagnosis Date    Analgesic nephropathy     Anemia     AP (angina pectoris) 1/11/2019    Arthritis     Colon polyp     Repeat colonoscopy due in 9/14    Coronary artery disease     Diverticulosis     colonoscopy 2/21/2014    Encounter for blood transfusion     GERD (gastroesophageal reflux disease)     Hemorrhoids     colonoscopy 2/21/2014    Horseshoe  kidney     Hyperglycemia 3/17/2014    Hyperlipidemia     Hypertension     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    Late complications of amputation stump     rseolved with further amputation( MRSA then none since 2014)    Lipoma of colon     colonoscopy 2/21/2014    Myocardial infarction     per patient 2000 & 9/2012    Peripheral vascular disease     Phantom limb syndrome     patient reports only intermittent not problematic, not worsening    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    Spinal cord disease     L4L5 disc    Stroke     Tobacco dependence     resolved    Ureteral stent retained        Past Surgical History:   Procedure Laterality Date    ABDOMINAL AORTIC ANEURYSM REPAIR      ABDOMINAL AORTIC ANEURYSM REPAIR  1996/2014    AMPUTATION, LOWER LIMB      AORTA - BILATERAL FEMORAL ARTERY BYPASS GRAFT  2014    Left and right leg    CATHETERIZATION, HEART, LEFT Left 3/7/2019    [facility] at [facility] CATH LAB    COLONOSCOPY N/A 2/21/2014    Performed by Isaac [facility]anner MD at [facility] ENDO    CORONARY ANGIOPLASTY WITH STENT PLACEMENT  2000    Three placed in heart    CREATION, BYPASS, ARTERIAL, AORTA TO FEMORAL, BILATERAL Right 3/16/2014    Performed by [facility]il MD at [facility] OR    CYSTOSCOPY WITH STENT EXCHANGE/RETROGRADE PYELOGRAM Left 4/2/2015    Performed by Scooter Jin IV, MD at [facility] OR    CYSTOSCOPY WITH STENT PLACEMENT Left 5/4/2017    Performed by Scooter Jin IV, MD at [facility] OR    CYSTOSCOPY WITH STENT PLACEMENT Left 4/28/2016    Performed by Scooter Jin IV, MD at [facility] OR    CYSTOSCOPY, WITH RETROGRADE PYELOGRAM Left 5/29/2018    Performed by Scooter Jin IV, [facility] at [facility] OR    CYSTOSCOPY, WITH URETERAL STENT INSERTION Left 5/29/2018    Performed by Scooter Jin IV, [facility] at [facility] OR    CYSTOSCOPY, WITH URETERAL STENT INSERTION Left 1/23/2014    Performed by Scooter Jin IV, [facility] at [facility] OR    CYSTOSCOPY, WITH URETERAL STENT  REMOVAL Left 5/29/2018    [facility] by [facility] at [facility] OR    EGD (ESOPHAGOGASTRODUODENOSCOPY) N/A 2/21/2014    Performed by Isaac Tanner MD at [facility] ENDO    ENDARTERECTOMY-CAROTID Left 8/18/2017    Performed by [facility] at [facility] OR    FOOT AMPUTATION THROUGH METATARSAL  1996    left    FOOT SURGERY Bilateral 1980's    per patient multiple toe amputations prior to.  partial foot amputation:first great toe then other toes     KIDNEY SURGERY  2014    per patient separation of horseshoe kidney @ time of AAA repair    LUNG LOBECTOMY Right 1970s    per patient not cancer    PYELOGRAM-RETROGRADE Left 5/4/2017    Performed by Scooter Jin IV, MD at [facility] OR    RESECTION-BOWEL N/A 3/16/2014    Performed by Stefan [facility] at [facility] OR    right below knee amputation  2009 (approx)    SMALL INTESTINE SURGERY  2014    per patient partial @ time of aaa repair  not small bowel - large bowel bowel compromised bythtwe AAAbowel    TONSILLECTOMY  1955 aprox    URETERAL STENT PLACEMENT Left     annually replaced since 2012 or so  Dr Jin       Review of patient's allergies indicates:   Allergen Reactions    Morphine Itching       Medications:  Medications Prior to Admission   Medication Sig    amLODIPine (NORVASC) 10 MG tablet TAKE 1 TABLET EVERY DAY    aspirin (ECOTRIN) 81 MG EC tablet Take 1 tablet (81 mg total) by mouth once daily.    carvedilol (COREG) 12.5 MG tablet Take 1 tablet (12.5 mg total) by mouth 2 (two) times daily with meals.    clopidogrel (PLAVIX) 75 mg tablet TAKE 1 TABLET EVERY DAY    cyclobenzaprine (FLEXERIL) 10 MG tablet Take 0.5 tablets (5 mg total) by mouth 3 (three) times daily as needed for Muscle spasms.    docusate sodium (COLACE) 100 MG capsule Take 1 capsule (100 mg total) by mouth 2 (two) times daily.    ergocalciferol (ERGOCALCIFEROL) 50,000 unit Cap Take 1 capsule (50,000 Units total) by mouth every 7 days.    isosorbide mononitrate  (IMDUR) 60 MG 24 hr tablet Take 1 tablet (60 mg total) by mouth once daily.    losartan (COZAAR) 100 MG tablet Take 1 tablet (100 mg total) by mouth once daily.    nitroglycerin (NITROSTAT) 0.6 MG Subl Place 1 tablet (0.6 mg total) under the tongue every 5 (five) minutes as needed (max 3/ per episode).    oxyCODONE-acetaminophen (PERCOCET) 5-325 mg per tablet Take 1 tablet by mouth every 6 (six) hours as needed for Pain.    pantoprazole (PROTONIX) 40 MG tablet Take 1 tablet (40 mg total) by mouth once daily.    pravastatin (PRAVACHOL) 80 MG tablet Take 1 tablet (80 mg total) by mouth every evening.     Antibiotics (From admission, onward)    Start     Stop Route Frequency Ordered    03/31/19 1200  Vancomycin 1.5 gm PLACEHOLDER D5W 100 ml IVPB      -- IV Every 72 hours 03/27/19 1143    03/28/19 0930  vancomycin (VANCOCIN) 1,250 mg in dextrose 5 % 250 mL IVPB      -- IV Once 03/28/19 0817    03/27/19 1900  ampicillin (OMNIPEN) 4 g in  mL EXTENDED INFUSION      -- IV Every 8 hours (non-standard times) 03/27/19 1725    03/27/19 1830  cefTRIAXone (ROCEPHIN) 2 g in dextrose 5 % 50 mL IVPB      -- IV Every 12 hours (non-standard times) 03/27/19 1725        Antifungals (From admission, onward)    None        Antivirals (From admission, onward)        Stop Route Frequency     acyclovir (ZOVIRAX) injection      -- IV Daily           Immunization History   Administered Date(s) Administered    Influenza - High Dose 12/04/2014, 12/29/2016, 10/23/2017, 11/06/2018    Influenza Split 11/25/2013    Pneumococcal Conjugate - 13 Valent 12/04/2014    Pneumococcal Polysaccharide - 23 Valent 06/23/2016    Tdap 12/04/2014    Zoster 01/11/2012       Family History     Problem Relation (Age of Onset)    COPD Mother    Cancer Mother    Diabetes Daughter    Heart disease Father        Social History     Socioeconomic History    Marital status:      Spouse name: Laury    Number of children: 2    Years of education:  None    Highest education level: None   Occupational History    Occupation: Retired      Comment: [employer]   Social Needs    Financial resource strain: None    Food insecurity:     Worry: None     Inability: None    Transportation needs:     Medical: None     Non-medical: None   Tobacco Use    Smoking status: Former Smoker     Packs/day: 1.00     Years: 15.00     Pack years: 15.00     Last attempt to quit: 1/1/2009     Years since quitting: 10.2    Smokeless tobacco: Never Used   Substance and Sexual Activity    Alcohol use: No    Drug use: No     Comment: Is on prescription opiod, no non prescribed use    Sexual activity: Not Currently     Partners: Female   Lifestyle    Physical activity:     Days per week: None     Minutes per session: None    Stress: None   Relationships    Social connections:     Talks on phone: None     Gets together: None     Attends Sikh service: None     Active member of club or organization: None     Attends meetings of clubs or organizations: None     Relationship status: None    Intimate partner violence:     Fear of current or ex partner: None     Emotionally abused: None     Physically abused: None     Forced sexual activity: None   Other Topics Concern    None   Social History Narrative     . 4 children alive and well. 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  Still drives. Does not have a Living Will.     Review of Systems   Unable to perform ROS: Acuity of condition     Objective:     Vital Signs (Most Recent):  Temp: 96.9 °F (36.1 °C) (03/28/19 0832)  Pulse: 80 (03/28/19 0923)  Resp: 18 (03/28/19 0838)  BP: 137/62 (03/28/19 0832)  SpO2: 97 % (03/28/19 0838) Vital Signs (24h Range):  Temp:  [96.9 °F (36.1 °C)-101.2 °F (38.4 °C)] 96.9 °F (36.1 °C)  Pulse:  [] 80  Resp:  [15-22] 18  SpO2:  [93 %-97 %] 97 %  BP: (132-162)/(60-77) 137/62     Weight: 87.1 kg (192 lb)  Body mass index is  25.33 kg/m².    Estimated Creatinine Clearance: 21.6 mL/min (A) (based on SCr of 3.6 mg/dL (H)).    Physical Exam   Constitutional: He appears well-developed and well-nourished.   HENT:   Head: Normocephalic and atraumatic.   Eyes: Pupils are equal, round, and reactive to light. EOM are normal.   Neck: Normal range of motion. No JVD present. Muscular tenderness present. Neck rigidity present.   Cardiovascular: Regular rhythm and intact distal pulses. Tachycardia present.   Murmur heard.   Systolic murmur is present with a grade of 3/6.  Pulmonary/Chest: Effort normal. No respiratory distress. He has no wheezes. He has rhonchi in the right middle field.   Abdominal: Soft. Bowel sounds are normal.   Musculoskeletal: Normal range of motion. He exhibits no deformity.   Neurological: He is alert. He has normal reflexes.   Skin: Skin is warm and dry. Capillary refill takes 2 to 3 seconds.   Nursing note and vitals reviewed.      Significant Labs:   Blood Culture: No results for input(s): LABBLOO in the last 4320 hours.  BMP:   Recent Labs   Lab 03/27/19  0118  03/28/19  0501   *   < > 141*   *   < > 137   K 4.0   < > 3.9      < > 113*   CO2 10*   < > 12*   BUN 62*   < > 65*   CREATININE 4.8*   < > 3.6*   CALCIUM 8.3*   < > 9.0   MG 1.2*  --   --     < > = values in this interval not displayed.     Urine Culture: No results for input(s): LABURIN in the last 4320 hours.  All pertinent labs within the past 24 hours have been reviewed.    Significant Imaging: I have reviewed all pertinent imaging results/findings within the past 24 hours.

## 2019-03-28 NOTE — PROGRESS NOTES
RT came for breathing tx. Patient still asleep with family at the bedside. Family stated she wants patient to rest and not to wake. She asked if we could come back later. Informed her we will check back later.

## 2019-03-28 NOTE — SUBJECTIVE & OBJECTIVE
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    Late complications of amputation stump     rseolved with further amputation( MRSA then none since 2014)    Lipoma of colon     colonoscopy 2/21/2014    Myocardial infarction     per patient 2000 & 9/2012    Peripheral vascular disease     Phantom limb syndrome     patient reports only intermittent not problematic, not worsening    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    Spinal cord disease     L4L5 disc    Stroke     Tobacco dependence     resolved    Ureteral stent retained        Past Surgical History:   Procedure Laterality Date    ABDOMINAL AORTIC ANEURYSM REPAIR      ABDOMINAL AORTIC ANEURYSM REPAIR  1996/2014    AMPUTATION, LOWER LIMB      AORTA - BILATERAL FEMORAL ARTERY BYPASS GRAFT  2014    Left and right leg    CATHETERIZATION, HEART, LEFT Left 3/7/2019    Performed by [facility]one MD at [facility] CATH LAB    COLONOSCOPY N/A 2/21/2014    Performed by Isaac Tanner MD at [facility] ENDO    CORONARY ANGIOPLASTY WITH STENT PLACEMENT  2000    Three placed in heart    CREATION, BYPASS, ARTERIAL, AORTA TO FEMORAL, BILATERAL Right 3/16/2014    [facility]formed by Stefan Jamil MD at [facility] OR    CYSTOSCOPY WITH STENT EXCHANGE/RETROGRADE PYELOGRAM Left 4/2/2015    Performed by Scooter Jin IV, MD at [facility] OR    CYSTOSCOPY WITH STENT PLACEMENT Left 5/4/2017    Performed by Scooter Jin IV, MD at [facility] OR    CYSTOSCOPY WITH STENT PLACEMENT Left 4/28/2016    Performed by Scooter Jin  [facility] OR    CYSTOSCOPY, WITH RETROGRADE PYELOGRAM Left 5/29/2018    Performed by Scooter Jin IV, MD at [facility] OR    CYSTOSCOPY, WITH URETERAL STENT INSERTION Left 5/29/2018    Performed by Scooter Jin IV, MD at [facility] OR    CYSTOSCOPY, WITH URETERAL STENT INSERTION Left 1/23/2014    Performed by Scooter Jin IV, MD at [facility] OR    CYSTOSCOPY, WITH URETERAL STENT REMOVAL Left 5/29/2018    Performed by Scooter Jin IV, MD at [facility] OR    EGD (ESOPHAGOGASTRODUODENOSCOPY) N/A 2/21/2014    Performed by Isaac Tanner MD at [facility] ENDO    ENDARTERECTOMY-CAROTID Left 8/18/2017    Performed by Stefan Jamil MD at [facility] OR    FOOT AMPUTATION THROUGH METATARSAL  1996    left    FOOT SURGERY Bilateral 1980's    per patient multiple toe amputations prior to.  partial foot amputation:first great toe then other toes     KIDNEY SURGERY  2014    per patient separation of horseshoe kidney @ time of AAA repair    LUNG LOBECTOMY Right 1970s    per patient not cancer    PYELOGRAM-RETROGRADE Left 5/4/2017    Performed by Scooter Jin IV, MD at [facility] OR    RESECTION-BOWEL N/A 3/16/2014    Performed by Stefan [facility]il MD at [facility] OR    right below knee amputation  2009 (approx)    SMALL INTESTINE SURGERY  2014    per patient partial @ time of aaa repair  not small bowel - large bowel bowel compromised bythtwe AAAbowel    TONSILLECTOMY  1955 aprox    URETERAL STENT PLACEMENT Left     annually replaced since 2012 or so  Dr Jin       Review of patient's allergies indicates:   Allergen Reactions    Morphine Itching       Medications:  Medications Prior to Admission   Medication Sig    amLODIPine (NORVASC) 10 MG tablet TAKE 1 TABLET EVERY DAY    aspirin (ECOTRIN) 81 MG EC tablet Take 1 tablet (81 mg total) by mouth once daily.    carvedilol (COREG) 12.5 MG tablet Take 1 tablet (12.5 mg total) by mouth 2 (two) times daily with meals.    clopidogrel (PLAVIX) 75 mg tablet TAKE 1 TABLET  EVERY DAY    cyclobenzaprine (FLEXERIL) 10 MG tablet Take 0.5 tablets (5 mg total) by mouth 3 (three) times daily as needed for Muscle spasms.    docusate sodium (COLACE) 100 MG capsule Take 1 capsule (100 mg total) by mouth 2 (two) times daily.    ergocalciferol (ERGOCALCIFEROL) 50,000 unit Cap Take 1 capsule (50,000 Units total) by mouth every 7 days.    isosorbide mononitrate (IMDUR) 60 MG 24 hr tablet Take 1 tablet (60 mg total) by mouth once daily.    losartan (COZAAR) 100 MG tablet Take 1 tablet (100 mg total) by mouth once daily.    nitroglycerin (NITROSTAT) 0.6 MG Subl Place 1 tablet (0.6 mg total) under the tongue every 5 (five) minutes as needed (max 3/ per episode).    oxyCODONE-acetaminophen (PERCOCET) 5-325 mg per tablet Take 1 tablet by mouth every 6 (six) hours as needed for Pain.    pantoprazole (PROTONIX) 40 MG tablet Take 1 tablet (40 mg total) by mouth once daily.    pravastatin (PRAVACHOL) 80 MG tablet Take 1 tablet (80 mg total) by mouth every evening.     Antibiotics (From admission, onward)    Start     Stop Route Frequency Ordered    03/31/19 1200  Vancomycin 1.5 gm PLACEHOLDER D5W 100 ml IVPB      -- IV Every 72 hours 03/27/19 1143    03/28/19 0930  vancomycin (VANCOCIN) 1,250 mg in dextrose 5 % 250 mL IVPB      -- IV Once 03/28/19 0817    03/27/19 1900  ampicillin (OMNIPEN) 4 g in  mL EXTENDED INFUSION      -- IV Every 8 hours (non-standard times) 03/27/19 1725    03/27/19 1830  cefTRIAXone (ROCEPHIN) 2 g in dextrose 5 % 50 mL IVPB      -- IV Every 12 hours (non-standard times) 03/27/19 1725        Antifungals (From admission, onward)    None        Antivirals (From admission, onward)        Stop Route Frequency     acyclovir (ZOVIRAX) injection      -- IV Daily           Immunization History   Administered Date(s) Administered    Influenza - High Dose 12/04/2014, 12/29/2016, 10/23/2017, 11/06/2018    Influenza Split 11/25/2013    Pneumococcal Conjugate - 13 Valent  12/04/2014    Pneumococcal Polysaccharide - 23 Valent 06/23/2016    Tdap 12/04/2014    Zoster 01/11/2012       Family History     Problem Relation (Age of Onset)    COPD Mother    Cancer Mother    Diabetes Daughter    Heart disease Father        Social History     Socioeconomic History    Marital status:      Spouse name: Laury    Number of children: 2    Years of education: None    Highest education level: None   Occupational History    Occupation: Retired      Comment: [employer]   Social Needs    Financial resource strain: None    Food insecurity:     Worry: None     Inability: None    Transportation needs:     Medical: None     Non-medical: None   Tobacco Use    Smoking status: Former Smoker     Packs/day: 1.00     Years: 15.00     Pack years: 15.00     Last attempt to quit: 1/1/2009     Years since quitting: 10.2    Smokeless tobacco: Never Used   Substance and Sexual Activity    Alcohol use: No    Drug use: No     Comment: Is on prescription opiod, no non prescribed use    Sexual activity: Not Currently     Partners: Female   Lifestyle    Physical activity:     Days per week: None     Minutes per session: None    Stress: None   Relationships    Social connections:     Talks on phone: None     Gets together: None     Attends Holiness service: None     Active member of club or organization: None     Attends meetings of clubs or organizations: None     Relationship status: None    Intimate partner violence:     Fear of current or ex partner: None     Emotionally abused: None     Physically abused: None     Forced sexual activity: None   Other Topics Concern    None   Social History Narrative     . 4 children alive and well. 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  Still drives. Does not have a Living Will.     Review of Systems   Unable to perform ROS: Acuity of condition     Objective:     Vital Signs (Most  Recent):  Temp: 96.9 °F (36.1 °C) (03/28/19 0832)  Pulse: 80 (03/28/19 0923)  Resp: 18 (03/28/19 0838)  BP: 137/62 (03/28/19 0832)  SpO2: 97 % (03/28/19 0838) Vital Signs (24h Range):  Temp:  [96.9 °F (36.1 °C)-101.2 °F (38.4 °C)] 96.9 °F (36.1 °C)  Pulse:  [] 80  Resp:  [15-22] 18  SpO2:  [93 %-97 %] 97 %  BP: (132-162)/(60-77) 137/62     Weight: 87.1 kg (192 lb)  Body mass index is 25.33 kg/m².    Estimated Creatinine Clearance: 21.6 mL/min (A) (based on SCr of 3.6 mg/dL (H)).    Physical Exam   Constitutional: He appears well-developed and well-nourished.   HENT:   Head: Normocephalic and atraumatic.   Eyes: Pupils are equal, round, and reactive to light. EOM are normal.   Neck: Normal range of motion. No JVD present. Muscular tenderness present. Neck rigidity present.   Cardiovascular: Regular rhythm and intact distal pulses. Tachycardia present.   Murmur heard.   Systolic murmur is present with a grade of 3/6.  Pulmonary/Chest: Effort normal. No respiratory distress. He has no wheezes. He has rhonchi in the right middle field.   Abdominal: Soft. Bowel sounds are normal.   Musculoskeletal: Normal range of motion. He exhibits no deformity.   Neurological: He is alert. He has normal reflexes.   Skin: Skin is warm and dry. Capillary refill takes 2 to 3 seconds.   Nursing note and vitals reviewed.      Significant Labs:   Blood Culture: No results for input(s): LABBLOO in the last 4320 hours.  BMP:   Recent Labs   Lab 03/27/19  0118  03/28/19  0501   *   < > 141*   *   < > 137   K 4.0   < > 3.9      < > 113*   CO2 10*   < > 12*   BUN 62*   < > 65*   CREATININE 4.8*   < > 3.6*   CALCIUM 8.3*   < > 9.0   MG 1.2*  --   --     < > = values in this interval not displayed.     Urine Culture: No results for input(s): LABURIN in the last 4320 hours.  All pertinent labs within the past 24 hours have been reviewed.    Significant Imaging: I have reviewed all pertinent imaging results/findings within  the past 24 hours.

## 2019-03-28 NOTE — SUBJECTIVE & OBJECTIVE
Interval History: Pt was seen and examined. No new c/o's, no new events, feels better, neck more relaxed.    Review of patient's allergies indicates:   Allergen Reactions    Morphine Itching     Current Facility-Administered Medications   Medication Frequency    0.9%  NaCl infusion Continuous    acetaminophen tablet 650 mg Q8H PRN    acyclovir (ZOVIRAX) 870 mg in dextrose 5 % 250 mL IVPB Daily    albuterol-ipratropium 2.5 mg-0.5 mg/3 mL nebulizer solution 3 mL Q8H    amLODIPine tablet 10 mg Daily    ampicillin (OMNIPEN) 4 g in  mL EXTENDED INFUSION Q8H    carvedilol tablet 12.5 mg BID WM    cefTRIAXone (ROCEPHIN) 2 g in dextrose 5 % 50 mL IVPB Q12H    docusate sodium capsule 100 mg BID    guaiFENesin 12 hr tablet 600 mg BID    heparin (porcine) injection 5,000 Units Q8H    isosorbide mononitrate 24 hr tablet 60 mg Daily    pantoprazole injection 40 mg Daily    vancomycin (VANCOCIN) 1,250 mg in dextrose 5 % 250 mL IVPB Once    [START ON 3/31/2019] Vancomycin 1.5 gm PLACEHOLDER D5W 100 ml IVPB Q72H       Objective:     Vital Signs (Most Recent):  Temp: 96.9 °F (36.1 °C) (03/28/19 0832)  Pulse: 76 (03/28/19 1100)  Resp: 18 (03/28/19 0838)  BP: 137/62 (03/28/19 0832)  SpO2: 97 % (03/28/19 0838)  O2 Device (Oxygen Therapy): room air (03/28/19 0838) Vital Signs (24h Range):  Temp:  [96.9 °F (36.1 °C)-101.2 °F (38.4 °C)] 96.9 °F (36.1 °C)  Pulse:  [] 76  Resp:  [15-22] 18  SpO2:  [93 %-97 %] 97 %  BP: (132-156)/(60-73) 137/62     Weight: 87.1 kg (192 lb) (03/27/19 0235)  Body mass index is 25.33 kg/m².  Body surface area is 2.12 meters squared.    I/O last 3 completed shifts:  In: 2838.7 [I.V.:1788.7; IV Piggyback:1050]  Out: 900 [Urine:900]    Physical Exam   Constitutional: He is oriented to person, place, and time. He appears well-developed and well-nourished. No distress.   HENT:   Head: Normocephalic and atraumatic.   Neck: No JVD present.   Cardiovascular: Normal rate, regular rhythm and  normal heart sounds. Exam reveals no friction rub.   Pulmonary/Chest: Effort normal and breath sounds normal. He has no rales.   Abdominal: Soft. Bowel sounds are normal. He exhibits no distension. There is no tenderness. There is no guarding.   Musculoskeletal: He exhibits tenderness. He exhibits no edema.   Tense neck   Neurological: He is alert and oriented to person, place, and time.   Skin: Skin is warm and dry.   Psychiatric: He has a normal mood and affect. His behavior is normal.   Nursing note and vitals reviewed.      Significant Labs: reviewed  BMP  Lab Results   Component Value Date     03/28/2019    K 3.9 03/28/2019     (H) 03/28/2019    CO2 12 (L) 03/28/2019    BUN 65 (H) 03/28/2019    CREATININE 3.6 (H) 03/28/2019    CALCIUM 9.0 03/28/2019    ANIONGAP 12 03/28/2019    ESTGFRAFRICA 19 (A) 03/28/2019    EGFRNONAA 16 (A) 03/28/2019     Lab Results   Component Value Date    WBC 6.80 03/28/2019    HGB 8.9 (L) 03/28/2019    HCT 26.5 (L) 03/28/2019    MCV 86 03/28/2019     (L) 03/28/2019       Significant Imaging: reviewed

## 2019-03-28 NOTE — ASSESSMENT & PLAN NOTE
Acute renal failure superimposed on chronic kidney disease  Has MARCELA on CKD stage 4.  MARCELA most likely due to XS use of ibuprofen.  On some days,per wife, takes 9 tablets !  Analgesic nephropathy  Strongly advised pt to quit NSAIds/ibuprofen.  Risk of kidney failure and needing dialysis sooner than later discussed     Also,elevated BP, accelerated HTN likely contributing to renal failure  Was not taking the BP meds prescribed  Noted BP better controlled after starting the outpt meds  Will monitor        Neck pain  CT head and CT neck unremarkable  DDX: torticollis  Will defer to PCP.        Plans and recommendations:  As dicussed above  Total time spent 70 minutes including time needed to review the records, the   patient evaluation, documentation, face-to-face discussion with the patient,   more than 50% of the time was spent on coordination of care and counseling.    Level V visit.

## 2019-03-28 NOTE — PLAN OF CARE
Problem: Adult Inpatient Plan of Care  Goal: Plan of Care Review  Outcome: Ongoing (interventions implemented as appropriate)  Plan of care reviewed and discussed with patient.  No acute distress noted.  Safety and fall precautions in place.  Patient free from injury.  Oral hydration and ambulation promoted.  Pain managed with ice packs.  IV hydration maintained.  NPO.  Lumbar puncture today.  Will continue to monitor.     12 hour chart check complete.

## 2019-03-28 NOTE — PT/OT/SLP EVAL
Occupational Therapy   Evaluation and Discharge Note    Name: Kodi Ribeiro  MRN: [id number]  Admitting Diagnosis:  Acute encephalopathy      Recommendations:     Discharge Recommendations: home health PT  Discharge Equipment Recommendations:  none  Barriers to discharge:       Assessment:     Kodi Ribeiro is a 69 y.o. male with a medical diagnosis of Acute encephalopathy. At this time, patient is functioning at their prior level of function and does not require further acute OT services.     Plan:     During this hospitalization, patient does not require further acute OT services.  Please re-consult if situation changes.    · Plan of Care Reviewed with: patient, family   · PT PLACED ON PEOPLE MOVERS PROGRAM    Subjective     Chief Complaint:   Patient/Family Comments/goals:     Occupational Profile:  Living Environment: LIVES AT HOME WITH SPOUSE AND 24 HOUR CARE  Previous level of function: MOD (I) WITH FUNCTIONAL MOBILITY AND (I) WITH ADL'S  Roles and Routines: OCCUPATIONAL THERAPY  Equipment Used at home:  grab bar, wheelchair, walker, rolling  Assistance upon Discharge:     Pain/Comfort:  · Pain Rating 1: 0/10    Patients cultural, spiritual, Voodoo conflicts given the current situation:      Objective:     Communicated with: NURSE PRESTON AND [facility] CHART REVIEW prior to session.  Patient found HOB elevated with telemetry, peripheral IV, oxygen upon OT entry to room.    General Precautions: Standard, fall   Orthopedic Precautions:N/A   Braces: N/A     Occupational Performance:    Bed Mobility:    · Patient completed Rolling/Turning to Left with  supervision  · Patient completed Rolling/Turning to Right with supervision  · Patient completed Scooting/Bridging with supervision  · Patient completed Supine to Sit with supervision  · Patient completed Sit to Supine with supervision      Activities of Daily Living:  · Upper Body Dressing: supervision .  · Lower Body Dressing: supervision .    Cognitive/Visual  Perceptual:  Cognitive/Psychosocial Skills:     -       Oriented to: Person, Place, Time and Situation   -       Follows Commands/attention:Follows two-step commands  -       Communication: clear/fluent  -       Memory: SLIGHTLY CONFUSED WITH MORE COMPLE QUESTIONING  -       Safety awareness/insight to disability: UNABLE TO ACCURATELY ASSESS   Visual/Perceptual:      -Intact .    Physical Exam:  Upper Extremity Range of Motion:     -       Right Upper Extremity: WFL  -       Left Upper Extremity: WFL  Upper Extremity Strength:    -       Right Upper Extremity: WFL  -       Left Upper Extremity: WFL   Strength:    -       Right Upper Extremity: WFL  -       Left Upper Extremity: WFL    AMPAC 6 Click ADL:  AMPAC Total Score: 24    Treatment & Education:  Education:    Patient left HOB elevated with all lines intact, call button in reach, bed alarm on, NURSE notified and FAMILY present    GOALS:   Multidisciplinary Problems     Occupational Therapy Goals     Not on file                History:     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    Late complications of amputation stump     rseolved with further amputation( MRSA then none since 2014)    Lipoma of colon     colonoscopy 2/21/2014    Myocardial infarction     per patient 2000 & 9/2012    Peripheral vascular disease     Phantom limb syndrome     patient reports only intermittent not problematic, not worsening    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    Spinal cord disease     L4L5 disc     Stroke     Tobacco dependence     resolved    Ureteral stent retained        Past Surgical History:   Procedure Laterality Date    ABDOMINAL AORTIC ANEURYSM REPAIR      ABDOMINAL AORTIC ANEURYSM REPAIR  1996/2014    AMPUTATION, LOWER LIMB      AORTA - BILATERAL FEMORAL ARTERY BYPASS GRAFT  2014    Left and right leg    CATHETERIZATION, HEART, LEFT Left 3/7/2019    Performed by [facility]one [facility] at [facility] CATH LAB    COLONOSCOPY N/A 2/21/2014    Performed by Isaac Tanner MD at [facility] ENDO    CORONARY ANGIOPLASTY WITH STENT PLACEMENT  2000    Three placed in heart    CREATION, BYPASS, ARTERIAL, AORTA TO FEMORAL, BILATERAL Right 3/16/2014    Performed by Stefan Jamil MD at [facility] OR    CYSTOSCOPY WITH STENT EXCHANGE/RETROGRADE PYELOGRAM Left 4/2/2015    Performed by Scooter Jin IV, MD at [facility] OR    CYSTOSCOPY WITH STENT PLACEMENT Left 5/4/2017    Performed by Scooter Jin IV, MD at [facility] OR    CYSTOSCOPY WITH STENT PLACEMENT Left 4/28/2016    Performed by Scooter Jin IV, MD at [facility] OR    CYSTOSCOPY, WITH RETROGRADE PYELOGRAM Left 5/29/2018    Performed by Scooter Jin IV, MD at [facility] OR    CYSTOSCOPY, WITH URETERAL STENT INSERTION Left 5/29/2018    Performed by Scooter Jin IV, MD at [facility] OR    CYSTOSCOPY, WITH URETERAL STENT INSERTION Left 1/23/2014    Performed by Scooter Jin IV, MD at [facility] OR    CYSTOSCOPY, WITH URETERAL STENT REMOVAL Left 5/29/2018    Performed by Scooter Jin IV, MD at [facility] OR    EGD (ESOPHAGOGASTRODUODENOSCOPY) N/A 2/21/2014    Performed by Isaac Tanner MD at [facility] ENDO    ENDARTERECTOMY-CAROTID Left 8/18/2017    Performed by Stefan Jmail MD at [facility] OR    FOOT AMPUTATION THROUGH METATARSAL  1996    left    FOOT SURGERY Bilateral 1980's    per patient multiple toe amputations prior to.  partial foot amputation:first great toe then other toes     KIDNEY SURGERY  2014    per patient separation of horseshoe kidney @ time of  AAA repair    LUNG LOBECTOMY Right 1970s    per patient not cancer    PYELOGRAM-RETROGRADE Left 5/4/2017    [facility] OR    RESECTION-BOWEL N/A 3/16/2014    Performed by Stefan [facility]il [facility] at [facility] OR    right below knee amputation  2009 (approx)    SMALL INTESTINE SURGERY  2014    per patient partial @ time of aaa repair  not small bowel - large bowel bowel compromised bythtwe AAAbowel    TONSILLECTOMY  1955 aprox    URETERAL STENT PLACEMENT Left     annually replaced since 2012 or so  Dr Jin       Time Tracking:     OT Date of Treatment: 03/28/19  OT Start Time: 1800  OT Stop Time: 1830  OT Total Time (min): 30 min    Billable Minutes:Evaluation 15 MINUTES  Self Care/Home Management 15 MINUTES    Radha Armstrong OT  3/28/2019

## 2019-03-29 PROBLEM — G03.9 MENINGITIS: Status: ACTIVE | Noted: 2019-03-29

## 2019-03-29 LAB
ANION GAP SERPL CALC-SCNC: 13 MMOL/L (ref 8–16)
BASOPHILS # BLD AUTO: 0 K/UL (ref 0–0.2)
BASOPHILS NFR BLD: 0 % (ref 0–1.9)
BUN SERPL-MCNC: 61 MG/DL (ref 8–23)
CALCIUM SERPL-MCNC: 8.7 MG/DL (ref 8.7–10.5)
CHLORIDE SERPL-SCNC: 110 MMOL/L (ref 95–110)
CO2 SERPL-SCNC: 13 MMOL/L (ref 23–29)
CREAT SERPL-MCNC: 2.6 MG/DL (ref 0.5–1.4)
DIFFERENTIAL METHOD: ABNORMAL
EOSINOPHIL # BLD AUTO: 0 K/UL (ref 0–0.5)
EOSINOPHIL NFR BLD: 0 % (ref 0–8)
ERYTHROCYTE [DISTWIDTH] IN BLOOD BY AUTOMATED COUNT: 15.2 % (ref 11.5–14.5)
EST. GFR  (AFRICAN AMERICAN): 28 ML/MIN/1.73 M^2
EST. GFR  (NON AFRICAN AMERICAN): 24 ML/MIN/1.73 M^2
GLUCOSE SERPL-MCNC: 160 MG/DL (ref 70–110)
HCT VFR BLD AUTO: 25.1 % (ref 40–54)
HGB BLD-MCNC: 8.4 G/DL (ref 14–18)
LYMPHOCYTES # BLD AUTO: 0.6 K/UL (ref 1–4.8)
LYMPHOCYTES NFR BLD: 7.9 % (ref 18–48)
MCH RBC QN AUTO: 29.1 PG (ref 27–31)
MCHC RBC AUTO-ENTMCNC: 33.5 G/DL (ref 32–36)
MCV RBC AUTO: 87 FL (ref 82–98)
MONOCYTES # BLD AUTO: 0.4 K/UL (ref 0.3–1)
MONOCYTES NFR BLD: 5.7 % (ref 4–15)
NEUTROPHILS # BLD AUTO: 6.5 K/UL (ref 1.8–7.7)
NEUTROPHILS NFR BLD: 86.4 % (ref 38–73)
PATH INTERP FLD-IMP: NORMAL
PLATELET # BLD AUTO: 135 K/UL (ref 150–350)
PMV BLD AUTO: 10.3 FL (ref 9.2–12.9)
POTASSIUM SERPL-SCNC: 3.5 MMOL/L (ref 3.5–5.1)
RBC # BLD AUTO: 2.89 M/UL (ref 4.6–6.2)
SODIUM SERPL-SCNC: 136 MMOL/L (ref 136–145)
VANCOMYCIN SERPL-MCNC: 24.4 UG/ML
WBC # BLD AUTO: 7.49 K/UL (ref 3.9–12.7)

## 2019-03-29 PROCEDURE — 21400001 HC TELEMETRY ROOM: Mod: HCNC

## 2019-03-29 PROCEDURE — 96372 THER/PROPH/DIAG INJ SC/IM: CPT | Mod: HCNC

## 2019-03-29 PROCEDURE — 87040 BLOOD CULTURE FOR BACTERIA: CPT | Mod: 59,HCNC

## 2019-03-29 PROCEDURE — 94640 AIRWAY INHALATION TREATMENT: CPT | Mod: HCNC

## 2019-03-29 PROCEDURE — 94761 N-INVAS EAR/PLS OXIMETRY MLT: CPT | Mod: HCNC

## 2019-03-29 PROCEDURE — 99233 SBSQ HOSP IP/OBS HIGH 50: CPT | Mod: HCNC,,, | Performed by: INTERNAL MEDICINE

## 2019-03-29 PROCEDURE — 97110 THERAPEUTIC EXERCISES: CPT | Mod: HCNC | Performed by: PHYSICAL THERAPIST

## 2019-03-29 PROCEDURE — C9113 INJ PANTOPRAZOLE SODIUM, VIA: HCPCS | Mod: HCNC | Performed by: NURSE PRACTITIONER

## 2019-03-29 PROCEDURE — 25000242 PHARM REV CODE 250 ALT 637 W/ HCPCS: Mod: HCNC | Performed by: NURSE PRACTITIONER

## 2019-03-29 PROCEDURE — 85025 COMPLETE CBC W/AUTO DIFF WBC: CPT | Mod: HCNC

## 2019-03-29 PROCEDURE — 25000003 PHARM REV CODE 250: Mod: HCNC | Performed by: HOSPITALIST

## 2019-03-29 PROCEDURE — 80048 BASIC METABOLIC PNL TOTAL CA: CPT | Mod: HCNC

## 2019-03-29 PROCEDURE — 36415 COLL VENOUS BLD VENIPUNCTURE: CPT | Mod: HCNC

## 2019-03-29 PROCEDURE — 63600175 PHARM REV CODE 636 W HCPCS: Mod: HCNC | Performed by: INTERNAL MEDICINE

## 2019-03-29 PROCEDURE — 99233 PR SUBSEQUENT HOSPITAL CARE,LEVL III: ICD-10-PCS | Mod: HCNC,,, | Performed by: INTERNAL MEDICINE

## 2019-03-29 PROCEDURE — 97530 THERAPEUTIC ACTIVITIES: CPT | Mod: HCNC | Performed by: PHYSICAL THERAPIST

## 2019-03-29 PROCEDURE — 25000003 PHARM REV CODE 250: Mod: HCNC | Performed by: INTERNAL MEDICINE

## 2019-03-29 PROCEDURE — 97803 MED NUTRITION INDIV SUBSEQ: CPT | Mod: HCNC

## 2019-03-29 PROCEDURE — 25000003 PHARM REV CODE 250: Mod: HCNC | Performed by: NURSE PRACTITIONER

## 2019-03-29 PROCEDURE — 63600175 PHARM REV CODE 636 W HCPCS: Mod: HCNC | Performed by: NURSE PRACTITIONER

## 2019-03-29 PROCEDURE — 94799 UNLISTED PULMONARY SVC/PX: CPT | Mod: HCNC

## 2019-03-29 PROCEDURE — 80202 ASSAY OF VANCOMYCIN: CPT | Mod: HCNC

## 2019-03-29 PROCEDURE — 92526 ORAL FUNCTION THERAPY: CPT | Mod: HCNC

## 2019-03-29 RX ORDER — RAMELTEON 8 MG/1
8 TABLET ORAL NIGHTLY PRN
Status: DISCONTINUED | OUTPATIENT
Start: 2019-03-29 | End: 2019-04-04 | Stop reason: HOSPADM

## 2019-03-29 RX ADMIN — DOCUSATE SODIUM 100 MG: 100 CAPSULE, LIQUID FILLED ORAL at 08:03

## 2019-03-29 RX ADMIN — CARVEDILOL 12.5 MG: 12.5 TABLET, FILM COATED ORAL at 05:03

## 2019-03-29 RX ADMIN — ACETAMINOPHEN 650 MG: 325 TABLET ORAL at 09:03

## 2019-03-29 RX ADMIN — IPRATROPIUM BROMIDE AND ALBUTEROL SULFATE 3 ML: .5; 3 SOLUTION RESPIRATORY (INHALATION) at 12:03

## 2019-03-29 RX ADMIN — IPRATROPIUM BROMIDE AND ALBUTEROL SULFATE 3 ML: .5; 3 SOLUTION RESPIRATORY (INHALATION) at 04:03

## 2019-03-29 RX ADMIN — IPRATROPIUM BROMIDE AND ALBUTEROL SULFATE 3 ML: .5; 3 SOLUTION RESPIRATORY (INHALATION) at 08:03

## 2019-03-29 RX ADMIN — GUAIFENESIN 600 MG: 600 TABLET, EXTENDED RELEASE ORAL at 08:03

## 2019-03-29 RX ADMIN — CEFTRIAXONE 2 G: 2 INJECTION, SOLUTION INTRAVENOUS at 05:03

## 2019-03-29 RX ADMIN — AMPICILLIN 4000 MG: 2 INJECTION, POWDER, FOR SOLUTION INTRAVENOUS at 09:03

## 2019-03-29 RX ADMIN — ISOSORBIDE MONONITRATE 60 MG: 60 TABLET, EXTENDED RELEASE ORAL at 08:03

## 2019-03-29 RX ADMIN — AMPICILLIN 4000 MG: 2 INJECTION, POWDER, FOR SOLUTION INTRAVENOUS at 01:03

## 2019-03-29 RX ADMIN — CARVEDILOL 12.5 MG: 12.5 TABLET, FILM COATED ORAL at 07:03

## 2019-03-29 RX ADMIN — ACETAMINOPHEN 650 MG: 325 TABLET ORAL at 05:03

## 2019-03-29 RX ADMIN — RAMELTEON 8 MG: 8 TABLET, FILM COATED ORAL at 09:03

## 2019-03-29 RX ADMIN — AMPICILLIN 4000 MG: 2 INJECTION, POWDER, FOR SOLUTION INTRAVENOUS at 05:03

## 2019-03-29 RX ADMIN — ACETAMINOPHEN 650 MG: 325 TABLET ORAL at 12:03

## 2019-03-29 RX ADMIN — HEPARIN SODIUM 5000 UNITS: 5000 INJECTION, SOLUTION INTRAVENOUS; SUBCUTANEOUS at 09:03

## 2019-03-29 RX ADMIN — ACYCLOVIR SODIUM 870 MG: 1000 INJECTION, SOLUTION INTRAVENOUS at 09:03

## 2019-03-29 RX ADMIN — PANTOPRAZOLE SODIUM 40 MG: 40 INJECTION, POWDER, LYOPHILIZED, FOR SOLUTION INTRAVENOUS at 09:03

## 2019-03-29 RX ADMIN — GUAIFENESIN 600 MG: 600 TABLET, EXTENDED RELEASE ORAL at 09:03

## 2019-03-29 RX ADMIN — AMLODIPINE BESYLATE 10 MG: 10 TABLET ORAL at 08:03

## 2019-03-29 RX ADMIN — HEPARIN SODIUM 5000 UNITS: 5000 INJECTION, SOLUTION INTRAVENOUS; SUBCUTANEOUS at 01:03

## 2019-03-29 RX ADMIN — DOCUSATE SODIUM 100 MG: 100 CAPSULE, LIQUID FILLED ORAL at 09:03

## 2019-03-29 RX ADMIN — HEPARIN SODIUM 5000 UNITS: 5000 INJECTION, SOLUTION INTRAVENOUS; SUBCUTANEOUS at 05:03

## 2019-03-29 NOTE — PLAN OF CARE
Problem: Adult Inpatient Plan of Care  Goal: Plan of Care Review  Outcome: Ongoing (interventions implemented as appropriate)  Recommendations     Recommendation: 1. Continue current diet. 2. Add boost plus TID (chocolate). 3. Will continue to monitor.   Intervention: coordination of care   Goals: Meet > 85 % EEN/EPN while admitted  Nutrition Goal Status: continues   Communication of RD Recs: (POc, sticky note)

## 2019-03-29 NOTE — PROGRESS NOTES
"  [facility] -   Adult Nutrition  Progress Note    SUMMARY     Recommendations    Recommendation: 1. Continue current diet. 2. Add boost plus TID (chocolate). 3. Will continue to monitor.   Intervention: coordination of care   Goals: Meet > 85 % EEN/EPN while admitted  Nutrition Goal Status: continues   Communication of RD Recs: (POc, sticky note)    Reason for Assessment    Reason For Assessment: RD f/u  Dx:  1. Acute renal failure superimposed on chronic kidney disease, unspecified CKD stage, unspecified acute renal failure type    2. Altered mental status    3. Neck pain, bilateral    4. MARCELA (acute kidney injury)    5. Acute encephalopathy    6. Essential hypertension    7. Accelerated hypertension    8. Analgesic nephropathy      Hx: Anemia, Diverticulosis, CAD, GERD, HLD, HTN, Stroke.     General info comments:Pt now on cardiac pureed diet w/ nectar thick consistency per ST recs. Pt and Pt's family report poor appetite today, PO intake 25 % this am. Pt and Pt's family report no wt loss, good appetite and intake PTA. NFPE not completed d/t pt w/ no s/s of malnutrition.  Pt agreed to try oral supplements.   Discharge plan: Cardiac diet w/ pureed consistency      Nutrition Risk Screen    Nutrition Risk Screen: no indicators present    Nutrition/Diet History    Spiritual, Cultural Beliefs, Anglican Practices, Values that Affect Care: no    Anthropometrics    Temp: 96.3 °F (35.7 °C)  Height Method: Stated  Height: 6' 1" (185.4 cm)  Height (inches): 73 in  Weight Method: Bed Scale  Weight: 87.1 kg (192 lb)  Weight (lb): 192 lb  Ideal Body Weight (IBW), Male: 184 lb  % Ideal Body Weight, Male (lb): 104.35 lb  BMI (Calculated): 25.4  BMI Grade: 25 - 29.9 - overweight       Lab/Procedures/Meds    Pertinent Labs Reviewed: reviewed  BMP  Lab Results   Component Value Date     03/29/2019    K 3.5 03/29/2019     03/29/2019    CO2 13 (L) 03/29/2019    BUN 61 (H) 03/29/2019    CREATININE 2.6 (H) " 03/29/2019    CALCIUM 8.7 03/29/2019    ANIONGAP 13 03/29/2019    ESTGFRAFRICA 28 (A) 03/29/2019    EGFRNONAA 24 (A) 03/29/2019     Lab Results   Component Value Date    CALCIUM 8.7 03/29/2019    PHOS 3.0 03/27/2019     Lab Results   Component Value Date    ALBUMIN 2.8 (L) 03/27/2019     No results for input(s): POCTGLUCOSE in the last 24 hours.    Pertinent Medications Reviewed: reviewed    Physical Findings/Assessment     skin: wound     Estimated/Assessed Needs    Weight Used For Calorie Calculations: 87.1 kg (192 lb 0.3 oz)  Energy Calorie Requirements (kcal): 2020  Energy Need Method: [place]-St Jeor(x1.2)  Protein Requirements: 87 -104 g   Weight Used For Protein Calculations: 87.1 kg (192 lb 0.3 oz)     Estimated Fluid Requirement Method: RDA Method(or per MD)  RDA Method (mL): 2020         Nutrition Prescription Ordered    Nutrition Order Comments: cardiac pureed diet w/ nectar thick liquids    Evaluation of Received Nutrient/Fluid Intake       % Intake of Estimated Energy Needs: 0 - 25 %  % Meal Intake: 25 - 50 %    Nutrition Risk      2xweekly    Assessment and Plan     Nutrition Problem  Inadequate energy intake     Related to (etiology):   Decreased appetite    Signs and Symptoms (as evidenced by):   PO intake 25 % this am    Interventions/Recommendations (treatment strategy):  See above     Nutrition Diagnosis Status:   New        Monitor and Evaluation    Food and Nutrient Intake: energy intake  Food and Nutrient Adminstration: diet order  Anthropometric Measurements: weight  Biochemical Data, Medical Tests and Procedures: electrolyte and renal panel, glucose/endocrine profile  Nutrition-Focused Physical Findings: overall appearance, skin     Nutrition Follow-Up    RD Follow-up?: Yes

## 2019-03-29 NOTE — PLAN OF CARE
Problem: Physical Therapy Goal  Goal: Physical Therapy Goal  LTG'S TO BE MET IN 7 DAYS (4-3-19)  1. PT WILL VERONA FOR BED MOBILITY  2. PT WILL REQUIRE VERONA FOR TF'S  3. PT WILL TOLERATED BLE THEREX X 20 REPS     Outcome: Ongoing (interventions implemented as appropriate)  Bed mobility with SBA; refused t/f; (B) LE TE 1 x 20 reps sitting EOB

## 2019-03-29 NOTE — PLAN OF CARE
"Problem: SLP Goal  Goal: SLP Goal  1. Ongoing swallow assessment for safety of least restrictive diet.  2. Pt will complete oral motor/pharyngeal/laryngeal ex x 15 each with mod cues to improve swallow function.    Outcome: Ongoing (interventions implemented as appropriate)  Swallowing assessed with thin liquids at bedside with no overt signs of aspiration.  Pt does exhibit some decreased laryngeal elevation to palpation.  He reported no using the thickener that was recommended because he stated that his "swallow was better".  ST educated pt on aspiration, dysphagia, and importance of ST.        "

## 2019-03-29 NOTE — SUBJECTIVE & OBJECTIVE
Interval History: 69 year man with history of meningitis .   LP done showed-   CSF- wbc -360, lymph -40, seg 34  Review of Systems   Constitutional: Negative for activity change, appetite change, chills, diaphoresis and fatigue.   HENT: Negative for congestion, dental problem, ear discharge, ear pain and facial swelling.    Eyes: Negative for pain, discharge and itching.   Respiratory: Negative for apnea, cough, chest tightness and shortness of breath.    Cardiovascular: Negative for chest pain and leg swelling.   Gastrointestinal: Negative for abdominal distention and abdominal pain.   Endocrine: Negative for cold intolerance, heat intolerance and polydipsia.   Genitourinary: Negative for difficulty urinating, dysuria and enuresis.   Musculoskeletal: Negative for arthralgias and back pain.   Skin: Negative for color change and pallor.   Allergic/Immunologic: Negative for environmental allergies and food allergies.   Neurological: Negative for dizziness, facial asymmetry, light-headedness and headaches.   Hematological: Negative for adenopathy. Does not bruise/bleed easily.   Psychiatric/Behavioral: Negative for agitation and behavioral problems.     Objective:     Vital Signs (Most Recent):  Temp: 96.3 °F (35.7 °C) (03/29/19 0744)  Pulse: 76 (03/29/19 0807)  Resp: 18 (03/29/19 0807)  BP: (!) 168/85 (03/29/19 0744)  SpO2: 99 % (03/29/19 0807) Vital Signs (24h Range):  Temp:  [96.3 °F (35.7 °C)-98.7 °F (37.1 °C)] 96.3 °F (35.7 °C)  Pulse:  [70-80] 76  Resp:  [18] 18  SpO2:  [96 %-99 %] 99 %  BP: (123-168)/(58-85) 168/85     Weight: 87.1 kg (192 lb)  Body mass index is 25.33 kg/m².    Estimated Creatinine Clearance: 29.9 mL/min (A) (based on SCr of 2.6 mg/dL (H)).    Physical Exam   Constitutional: He appears well-developed and well-nourished.   HENT:   Head: Normocephalic and atraumatic.   Eyes: Pupils are equal, round, and reactive to light. EOM are normal.   Neck: Normal range of motion. No JVD present. Muscular  tenderness present. Neck rigidity present.   Cardiovascular: Regular rhythm and intact distal pulses. Tachycardia present.   Murmur heard.   Systolic murmur is present with a grade of 3/6.  Pulmonary/Chest: Effort normal. No respiratory distress. He has no wheezes. He has rhonchi in the right middle field.   Abdominal: Soft. Bowel sounds are normal.   Musculoskeletal: Normal range of motion. He exhibits no deformity.   Neurological: He is alert. He has normal reflexes.   Skin: Skin is warm and dry. Capillary refill takes 2 to 3 seconds.   Nursing note and vitals reviewed.      Significant Labs:   Blood Culture: No results for input(s): LABBLOO in the last 4320 hours.  BMP:   Recent Labs   Lab 03/29/19  0535   *      K 3.5      CO2 13*   BUN 61*   CREATININE 2.6*   CALCIUM 8.7     CBC:   Recent Labs   Lab 03/28/19  0502 03/29/19  0535   WBC 6.80 7.49   HGB 8.9* 8.4*   HCT 26.5* 25.1*   * 135*     All pertinent labs within the past 24 hours have been reviewed.    Significant Imaging: U/S: I have reviewed all pertinent results/findings within the past 24 hours:   I have reviewed all pertinent imaging results/findings within the past 24 hours.

## 2019-03-29 NOTE — ASSESSMENT & PLAN NOTE
3/28- CSF reviewed -consistent with meningitis , this can be bacterial versus viral .    Will continue present antimicrobial therapy but will need cultures to adjust therapy .  Will continue Acyclovir, continue Rocephin, Vancomycin, Ampicillin

## 2019-03-29 NOTE — PROGRESS NOTES
[facility]  Nephrology  Progress Note    Patient Name: Kodi Ribeiro  MRN: [id number]  Admission Date: 3/27/2019  Hospital Length of Stay: 2 days  Attending Provider: Stefan Leary MD   Primary Care Physician: Norma Nuñez MD  Principal Problem:Acute encephalopathy    Subjective:     HPI: Pt was seen and examined. H/o and chart reviewed. Pt is a 70 y/o male with ah/o of HTN and CKD stage 4, who presented with neck pain. W/u was reviewed. CT neck and head unremarkable. Pt did not taje his BP meds while experiencing neck pain. BP elevated. s Cr has worsened form baseline to 4.8, repeat 4.2. Pt's wife says that he takes ibuprofen. According to her he has taken 1-3 tablets 2-3 times a day. She actually says that on some days he has taken 9 per day. Pt has no other c/o's, no sx's of dysuria, no GI losses.    Interval History: pt was seen and examined. No new issues, stable last night. No new c/o's, no SOB. Discussed the medical issues with pt's wife.    Review of patient's allergies indicates:   Allergen Reactions    Morphine Itching     Current Facility-Administered Medications   Medication Frequency    acetaminophen tablet 650 mg Q8H PRN    acyclovir (ZOVIRAX) 870 mg in dextrose 5 % 250 mL IVPB Daily    albuterol-ipratropium 2.5 mg-0.5 mg/3 mL nebulizer solution 3 mL Q8H    amLODIPine tablet 10 mg Daily    ampicillin (OMNIPEN) 4 g in  mL EXTENDED INFUSION Q8H    carvedilol tablet 12.5 mg BID WM    cefTRIAXone (ROCEPHIN) 2 g in dextrose 5 % 50 mL IVPB Q12H    docusate sodium capsule 100 mg BID    guaiFENesin 12 hr tablet 600 mg BID    heparin (porcine) injection 5,000 Units Q8H    isosorbide mononitrate 24 hr tablet 60 mg Daily    pantoprazole injection 40 mg Daily    sodium chloride 0.45% 1,000 mL with sodium acetate 75 mEq infusion Continuous    [START ON 3/31/2019] Vancomycin 1.5 gm PLACEHOLDER D5W 100 ml IVPB Q72H       Objective:     Vital Signs (Most Recent):  Temp:  96.3 °F (35.7 °C) (03/29/19 0744)  Pulse: 76 (03/29/19 0807)  Resp: 18 (03/29/19 0807)  BP: (!) 168/85 (03/29/19 0744)  SpO2: 99 % (03/29/19 0807)  O2 Device (Oxygen Therapy): room air (03/29/19 0807) Vital Signs (24h Range):  Temp:  [96.3 °F (35.7 °C)-98.7 °F (37.1 °C)] 96.3 °F (35.7 °C)  Pulse:  [70-80] 76  Resp:  [18] 18  SpO2:  [96 %-99 %] 99 %  BP: (123-168)/(58-85) 168/85     Weight: 87.1 kg (192 lb) (03/27/19 0235)  Body mass index is 25.33 kg/m².  Body surface area is 2.12 meters squared.    I/O last 3 completed shifts:  In: 3455.8 [P.O.:120; I.V.:1685.8; IV Piggyback:1650]  Out: 300 [Urine:300]    Physical Exam   Constitutional: He is oriented to person, place, and time. He appears well-developed and well-nourished. No distress.   HENT:   Head: Normocephalic and atraumatic.   Neck: No JVD present.   Cardiovascular: Normal rate, regular rhythm and normal heart sounds. Exam reveals no friction rub.   Pulmonary/Chest: Effort normal and breath sounds normal. He has no rales.   Abdominal: Soft. Bowel sounds are normal. He exhibits no distension. There is no tenderness. There is no guarding.   Musculoskeletal: He exhibits tenderness. He exhibits no edema.   Tense neck   Neurological: He is alert and oriented to person, place, and time.   Skin: Skin is warm and dry.   Psychiatric: He has a normal mood and affect. His behavior is normal.   Nursing note and vitals reviewed.      Significant Labs: reviewed  BMP  Lab Results   Component Value Date     03/29/2019    K 3.5 03/29/2019     03/29/2019    CO2 13 (L) 03/29/2019    BUN 61 (H) 03/29/2019    CREATININE 2.6 (H) 03/29/2019    CALCIUM 8.7 03/29/2019    ANIONGAP 13 03/29/2019    ESTGFRAFRICA 28 (A) 03/29/2019    EGFRNONAA 24 (A) 03/29/2019     Lab Results   Component Value Date    WBC 7.49 03/29/2019    HGB 8.4 (L) 03/29/2019    HCT 25.1 (L) 03/29/2019    MCV 87 03/29/2019     (L) 03/29/2019         Assessment/Plan:   70 y/o male with MARCELA on CKD  stage 4 after taking large number of ibuprofen:    Acute renal failure superimposed on chronic kidney disease  s cr lower, renal function stable  MARCELA on CKD stage 4.  MARCELA most likely due to XS use of ibuprofen. Acute analgesic nephropathy  On some days, per wife, takes 9 tablets! Pt confirmed that  Advised pt that this large number of ibuprofen will cause faster kidney failure, will need HD sooner than later.  Strongly advised pt to quit NSAIds/ibuprofen.     Also, elevated BP, accelerated HTN likely contributing to renal failure  Was not taking the BP meds prescribed  Noted BP better controlled after starting the outpt meds, still high  Was on losartan, currently on hold  Will hold IVF's  Will monitor        Neck pain  CT head and CT neck unremarkable  DDX: torticollis  Will defer to PCP.        Plans and recommendations:  As dicussed above        Shirley Bella MD  Nephrology  [facility]

## 2019-03-29 NOTE — PROGRESS NOTES
[facility] Hospital Medicine  Progress Note    Patient Name: Kodi Ribeiro  MRN: [id number]  Patient Class: IP- Inpatient   Admission Date: 3/27/2019  Length of Stay: 1 days  Attending Physician: Stefan Leary MD  Primary Care Provider: Norma Nuñez MD        Subjective:     Principal Problem:Acute encephalopathy    HPI:   69-year-old male patient with extensive medical history presents today with complaint of neck pain that started over the weekend.  Patient reports he was seen in the emergency room a couple days ago was given pain medication and muscle relaxer and discharged home.  Patient reports that treatment with outpatient medications not effective and continues to complain of this pain.  Range of motion of neck worsens.  Associated symptoms include some confusion and weakness endorsed by patient's wife at bedside.  Additionally patient's wife reports patient has not been taking his regular medicines over the last couple of days because she was worried only about his pain and treating that and forgot about his other medicines.  Patient was evaluated in the emergency room.   Significant labs creatinine of 4.8 and BUN of 4.2.  Per emergency room note  No acute findings on CT of cervical spine and CT head.   Hospital Medicine consult.  Patient placed in observation.    Hospital Course:  3/27  Patient admitted to hospital with CC Neck Pain. Patient placed on BS abx. Concern is for meningitis as patient continues with AMS, febrile episodes and c/o Neck Pain 10/10. Family at bedside. ID consulted. Patient covered broadly. Patient unable to obtain LP per IR due to his last receiving plavix on Sunday.  Dr Curtis has agreed to do LP Thursday. CT head negative and MRI unable to obtain due to patient uncooperativity / claustrophobia per family. Patient was responding to query correctly at time of exam. He was able to tell me where he is, the year, and the president. Family reports this is an  improvement from earlier today. POC discussed in detail.  28 March successful lumbar puncture.    Interval History:  Significant improvement in mental status.  Expect lumbar puncture today.    Review of Systems   Unable to perform ROS: Mental status change   Constitutional: Positive for activity change and fatigue.   Neurological: Positive for weakness.   Psychiatric/Behavioral: Positive for agitation and confusion.     Objective:     Vital Signs (Most Recent):  Temp: 97.9 °F (36.6 °C) (03/28/19 1921)  Pulse: 75 (03/28/19 1921)  Resp: 18 (03/28/19 1921)  BP: 132/62 (03/28/19 1921)  SpO2: 96 % (03/28/19 1921) Vital Signs (24h Range):  Temp:  [96.9 °F (36.1 °C)-98.9 °F (37.2 °C)] 97.9 °F (36.6 °C)  Pulse:  [] 75  Resp:  [15-22] 18  SpO2:  [94 %-97 %] 96 %  BP: (123-151)/(60-72) 132/62     Weight: 87.1 kg (192 lb)  Body mass index is 25.33 kg/m².    Intake/Output Summary (Last 24 hours) at 3/28/2019 2134  Last data filed at 3/28/2019 1731  Gross per 24 hour   Intake 2483.33 ml   Output 300 ml   Net 2183.33 ml      Physical Exam   Constitutional: He appears well-developed and well-nourished.   HENT:   Head: Normocephalic and atraumatic.   Eyes: Pupils are equal, round, and reactive to light. EOM are normal.   Neck: Normal range of motion. No JVD present. Muscular tenderness present. Neck rigidity present.   Cardiovascular: Regular rhythm and intact distal pulses. Tachycardia present.   Murmur heard.   Systolic murmur is present with a grade of 3/6.  Pulmonary/Chest: Effort normal. No respiratory distress. He has no wheezes. He has rhonchi in the right middle field.   Abdominal: Soft. Bowel sounds are normal.   Musculoskeletal: Normal range of motion. He exhibits no deformity.   Neurological: He is alert. He has normal reflexes.   Skin: Skin is warm and dry. Capillary refill takes 2 to 3 seconds.   Nursing note and vitals reviewed.      Significant Labs:   ABGs: No results for input(s): PH, PCO2, HCO3,  POCSATURATED, BE, TOTALHB, COHB, METHB, O2HB, POCFIO2 in the last 48 hours.  Blood Culture: No results for input(s): LABBLOO in the last 48 hours.  BMP:   Recent Labs   Lab 03/27/19 0118 03/28/19  0501   *   < > 141*   *   < > 137   K 4.0   < > 3.9      < > 113*   CO2 10*   < > 12*   BUN 62*   < > 65*   CREATININE 4.8*   < > 3.6*   CALCIUM 8.3*   < > 9.0   MG 1.2*  --   --     < > = values in this interval not displayed.     CBC:   Recent Labs   Lab 03/27/19 0118 03/27/19  0547 03/28/19  0502   WBC 9.73 8.19 6.80   HGB 10.0* 9.5* 8.9*   HCT 30.1* 29.3* 26.5*    153 129*     CMP:   Recent Labs   Lab 03/27/19 0118 03/27/19  0547 03/28/19  0501   * 134* 137   K 4.0 4.0 3.9    111* 113*   CO2 10* 10* 12*   * 134* 141*   BUN 62* 61* 65*   CREATININE 4.8* 4.2* 3.6*   CALCIUM 8.3* 8.0* 9.0   PROT 7.5  --   --    ALBUMIN 2.8*  --   --    BILITOT 0.4  --   --    ALKPHOS 173*  --   --    AST 20  --   --    ALT 12  --   --    ANIONGAP 14 13 12   EGFRNONAA 11* 13* 16*     Troponin:   Recent Labs   Lab 03/27/19 0118 03/27/19  0547 03/27/19  1126   TROPONINI 0.020 0.019 0.047*     Urine Studies:   Recent Labs   Lab 03/27/19  0156   COLORU Yellow   APPEARANCEUA Clear   PHUR 6.0   SPECGRAV 1.020   PROTEINUA 2+*   GLUCUA Trace*   KETONESU Negative   BILIRUBINUA Negative   OCCULTUA 2+*   NITRITE Negative   UROBILINOGEN Negative   LEUKOCYTESUR Negative   RBCUA 3   WBCUA 1   BACTERIA None   SQUAMEPITHEL 1   HYALINECASTS 0     All pertinent labs within the past 24 hours have been reviewed.    Significant Imaging: I have reviewed all pertinent imaging results/findings within the past 24 hours.    Assessment/Plan:      * Acute encephalopathy    Related to meds and high blood pressure suspected.    Resume home meds to control blood pressure.  CT of head negative for any acute findings at this time.  UA negative.     Try to avoid narcotics or any sedated or mood altering medications.     Neuro checks.    Consider further diagnostic testing such as MRI, ultrasound bilateral carotids, echo  Pending course.    Consider Neurology consult pending course.  Infectious disease evaluated and following up recommendations.  Plan for lumbar puncture today and continuing empiric treatment.    Debility    Pt/ot eval, dietary consult, supportive care, social work consult.     Neck pain    CT of neck negative for any acute findings per virtual read, CT of head negative for any acute findings per virtual read.    No meningeal signs on exam.  No WBC, afebrile.  Infectious process not suspected.    Goal for better control the BP.  Consider ultrasound bilateral carotids and are further imaging pending course.    Correct electrolyte abnormalities and acute on chronic renal failure.    Tylenol   P.r.n.   try to avoid any narcotics for altering medications, avoid NSAIDs.  Consider dose of steroid to assist with pain pending course    Trend troponins and consider further cardiac workup pending course.  Further evaluation/diagnostics/interventions/consults pending course     3/26  LP plan in progress  Consider MRI Head / Neck with sedation  CT Negative       Hypomagnesemia    Replace.  Monitor.      Acute renal failure superimposed on chronic kidney disease    Patient wife endorses decreased appetite and oral intake at home.  IV bolus given in ER.  Repeat labs this morning.  Continue gentle IV hydration    patient has not been taking his medications at home as prescribed.  Blood pressure elevated.   Nitropaste and assess response, resume home meds  For Bp control.   check chest x-ray and abdominal KUB.  Consider checking ultrasound of kidneys pending course.  Further evaluation/diagnostics/interventions/consults pending course     3/27  Improving with fluids  Monitor  Cr now 4.2        History of CVA (cerebrovascular accident)    PT/OT eval.  Social work consult.  Supportive care.  CT of head today no acute findings.  Neuro  checks monitor.  Further evaluation/diagnostics/interventions/consults pending course         Essential hypertension    Nitropaste to chest wall this time.  Resume home meds.    Patient and wife encouraged to follow plan of care and medication regimens.        VTE Risk Mitigation (From admission, onward)        Ordered     heparin (porcine) injection 5,000 Units  Every 8 hours      03/27/19 0518     Place MOISES hose  Until discontinued      03/27/19 0518     Place sequential compression device  Until discontinued      03/27/19 0518              Stefan Leary MD  Department of Hospital Medicine   [facility]

## 2019-03-29 NOTE — SUBJECTIVE & OBJECTIVE
Interval History:  Significant improvement in mental status.  Expect lumbar puncture today.    Review of Systems   Unable to perform ROS: Mental status change   Constitutional: Positive for activity change and fatigue.   Neurological: Positive for weakness.   Psychiatric/Behavioral: Positive for agitation and confusion.     Objective:     Vital Signs (Most Recent):  Temp: 97.9 °F (36.6 °C) (03/28/19 1921)  Pulse: 75 (03/28/19 1921)  Resp: 18 (03/28/19 1921)  BP: 132/62 (03/28/19 1921)  SpO2: 96 % (03/28/19 1921) Vital Signs (24h Range):  Temp:  [96.9 °F (36.1 °C)-98.9 °F (37.2 °C)] 97.9 °F (36.6 °C)  Pulse:  [] 75  Resp:  [15-22] 18  SpO2:  [94 %-97 %] 96 %  BP: (123-151)/(60-72) 132/62     Weight: 87.1 kg (192 lb)  Body mass index is 25.33 kg/m².    Intake/Output Summary (Last 24 hours) at 3/28/2019 2134  Last data filed at 3/28/2019 1731  Gross per 24 hour   Intake 2483.33 ml   Output 300 ml   Net 2183.33 ml      Physical Exam   Constitutional: He appears well-developed and well-nourished.   HENT:   Head: Normocephalic and atraumatic.   Eyes: Pupils are equal, round, and reactive to light. EOM are normal.   Neck: Normal range of motion. No JVD present. Muscular tenderness present. Neck rigidity present.   Cardiovascular: Regular rhythm and intact distal pulses. Tachycardia present.   Murmur heard.   Systolic murmur is present with a grade of 3/6.  Pulmonary/Chest: Effort normal. No respiratory distress. He has no wheezes. He has rhonchi in the right middle field.   Abdominal: Soft. Bowel sounds are normal.   Musculoskeletal: Normal range of motion. He exhibits no deformity.   Neurological: He is alert. He has normal reflexes.   Skin: Skin is warm and dry. Capillary refill takes 2 to 3 seconds.   Nursing note and vitals reviewed.      Significant Labs:   ABGs: No results for input(s): PH, PCO2, HCO3, POCSATURATED, BE, TOTALHB, COHB, METHB, O2HB, POCFIO2 in the last 48 hours.  Blood Culture: No results for  input(s): LABBLOO in the last 48 hours.  BMP:   Recent Labs   Lab 03/27/19 0118 03/28/19  0501   *   < > 141*   *   < > 137   K 4.0   < > 3.9      < > 113*   CO2 10*   < > 12*   BUN 62*   < > 65*   CREATININE 4.8*   < > 3.6*   CALCIUM 8.3*   < > 9.0   MG 1.2*  --   --     < > = values in this interval not displayed.     CBC:   Recent Labs   Lab 03/27/19 0118 03/27/19  0547 03/28/19  0502   WBC 9.73 8.19 6.80   HGB 10.0* 9.5* 8.9*   HCT 30.1* 29.3* 26.5*    153 129*     CMP:   Recent Labs   Lab 03/27/19 0118 03/27/19  0547 03/28/19  0501   * 134* 137   K 4.0 4.0 3.9    111* 113*   CO2 10* 10* 12*   * 134* 141*   BUN 62* 61* 65*   CREATININE 4.8* 4.2* 3.6*   CALCIUM 8.3* 8.0* 9.0   PROT 7.5  --   --    ALBUMIN 2.8*  --   --    BILITOT 0.4  --   --    ALKPHOS 173*  --   --    AST 20  --   --    ALT 12  --   --    ANIONGAP 14 13 12   EGFRNONAA 11* 13* 16*     Troponin:   Recent Labs   Lab 03/27/19 0118 03/27/19 0547 03/27/19  1126   TROPONINI 0.020 0.019 0.047*     Urine Studies:   Recent Labs   Lab 03/27/19  0156   COLORU Yellow   APPEARANCEUA Clear   PHUR 6.0   SPECGRAV 1.020   PROTEINUA 2+*   GLUCUA Trace*   KETONESU Negative   BILIRUBINUA Negative   OCCULTUA 2+*   NITRITE Negative   UROBILINOGEN Negative   LEUKOCYTESUR Negative   RBCUA 3   WBCUA 1   BACTERIA None   SQUAMEPITHEL 1   HYALINECASTS 0     All pertinent labs within the past 24 hours have been reviewed.    Significant Imaging: I have reviewed all pertinent imaging results/findings within the past 24 hours.

## 2019-03-29 NOTE — PT/OT/SLP PROGRESS
Physical Therapy  Treatment    Kodi Ribeiro   MRN: [id number]   Admitting Diagnosis: Acute encephalopathy    PT Received On: 03/29/19  PT Start Time: 1015     PT Stop Time: 1040    PT Total Time (min): 25 min       Billable Minutes:  Therapeutic Activity 10 min and Therapeutic Exercise 15 min    Treatment Type: Treatment  PT/PTA: PT     PTA Visit Number: 0       General Precautions: Standard, fall  Orthopedic Precautions: N/A   Braces: N/A    Spiritual, Cultural Beliefs, Jain Practices, Values that Affect Care: no    Subjective:  Communicated with Nurse Yarbrough and epic chart review prior to session.  Pt found supine in bed and agreeable to tx at this time. Pt only agreeable to exercises at this time.     Pain/Comfort  Pain Rating 1: 0/10  Pain Rating Post-Intervention 1: 0/10    Objective:   Patient found with: telemetry, peripheral IV    Pt performed supine>sit with SBA, scooted to EOB with SBA, (B) LE ROM therapeutic exercises sitting EOB 1 x 20 reps: MIP, TKE, hip abd/add. Pt returned to supine in bed with HOB elevated with SBA.       AM-PAC 6 CLICK MOBILITY  How much help from another person does this patient currently need?   1 = Unable, Total/Dependent Assistance  2 = A lot, Maximum/Moderate Assistance  3 = A little, Minimum/Contact Guard/Supervision  4 = None, Modified [place]/Independent    Turning over in bed (including adjusting bedclothes, sheets and blankets)?: 3  Sitting down on and standing up from a chair with arms (e.g., wheelchair, bedside commode, etc.): 1(refused)  Moving from lying on back to sitting on the side of the bed?: 3  Moving to and from a bed to a chair (including a wheelchair)?: 1(refused)  Need to walk in hospital room?: 1  Climbing 3-5 steps with a railing?: 1  Basic Mobility Total Score: 10    AM-PAC Raw Score CMS G-Code Modifier Level of Impairment Assistance   6 % Total / Unable   7 - 9 CM 80 - 100% Maximal Assist   10 - 14 CL 60 - 80% Moderate Assist   15 -  19 CK 40 - 60% Moderate Assist   20 - 22 CJ 20 - 40% Minimal Assist   23 CI 1-20% SBA / CGA   24 CH 0% Independent/ Mod I     Patient left HOB elevated with all lines intact, call button in reach, bed alarm on and Nurse Linnea notified.    Assessment:  Kodi Ribeiro is a 69 y.o. male with a medical diagnosis of Acute encephalopathy and presents with impaired functional mobility. Pt will benefit from continued skilled PT in order to address impairments.    Rehab identified problem list/impairments: Rehab identified problem list/impairments: weakness, impaired endurance, impaired self care skills, decreased lower extremity function, decreased ROM, gait instability, impaired functional mobilty, decreased safety awareness, pain    Rehab potential is good.    Activity tolerance: Good    Discharge recommendations: Discharge Facility/Level of Care Needs: home health PT     Barriers to discharge:      Equipment recommendations:       GOALS:   Multidisciplinary Problems     Physical Therapy Goals        Problem: Physical Therapy Goal    Goal Priority Disciplines Outcome Goal Variances Interventions   Physical Therapy Goal     PT, PT/OT Ongoing (interventions implemented as appropriate)     Description:  LTG'S TO BE MET IN 7 DAYS (4-3-19)  1. PT WILL [place] FOR BED MOBILITY  2. PT WILL REQUIRE [place] FOR TF'S  3. PT WILL TOLERATED BLE THEREX X 20 REPS                      PLAN:    Patient to be seen 5 x/week  to address the above listed problems via gait training, therapeutic activities, therapeutic exercises  Plan of Care expires: 04/03/19  Plan of Care reviewed with: patient    PT G-Codes  Functional Assessment Tool Used: [facility]  Score: 10    Bonnie Hawk, PT/OT  03/29/2019

## 2019-03-29 NOTE — ASSESSMENT & PLAN NOTE
Related to meds and high blood pressure suspected.    Resume home meds to control blood pressure.  CT of head negative for any acute findings at this time.  UA negative.     Try to avoid narcotics or any sedated or mood altering medications.    Neuro checks.    Consider further diagnostic testing such as MRI, ultrasound bilateral carotids, echo  Pending course.    Consider Neurology consult pending course.  Infectious disease evaluated and following up recommendations.  Plan for lumbar puncture today and continuing empiric treatment.

## 2019-03-29 NOTE — PT/OT/SLP PROGRESS
"Speech Language Pathology Treatment    Patient Name:  Kodi Ribeiro   MRN:  [id number]  Admitting Diagnosis: Acute encephalopathy    Recommendations:                 General Recommendations:  Dysphagia therapy  Diet recommendations:  Mechanical soft, Liquid Diet Level: Thin   Aspiration Precautions: 1 bite/sip at a time, HOB to 90 degrees and Remain upright 30 minutes post meal   General Precautions: Standard, fall  Communication strategies:  none    Subjective     PT was seen at bedside with his wife present  Patient goals: none stated     Pain/Comfort:  · Pain Rating 1: 0/10    Objective:     Has the patient been evaluated by SLP for swallowing?   Yes  Keep patient NPO? No   Current Respiratory Status: room air      Swallowing assessed with thin liquids at bedside with no overt signs of aspiration.  Pt does exhibit some decreased laryngeal elevation to palpation.  He reported no using the thickener that was recommended because he stated that his "swallow was better".  ST educated pt on aspiration, dysphagia, and importance of ST.        Assessment:     Kodi Ribeiro is a 69 y.o. male with an SLP diagnosis of Dysphagia.  He presents with decreased laryngeal elevation and reports his gums are too sore to wear his dentures.      Goals:   Multidisciplinary Problems     SLP Goals        Problem: SLP Goal    Goal Priority Disciplines Outcome   SLP Goal     SLP Ongoing (interventions implemented as appropriate)   Description:  1. Ongoing swallow assessment for safety of least restrictive diet.  2. Pt will complete oral motor/pharyngeal/laryngeal ex x 15 each with mod cues to improve swallow function.                     Plan:     · Patient to be seen:  2 x/week   · Plan of Care expires:  04/04/19  · Plan of Care reviewed with:  patient, family   · SLP Follow-Up:  Yes       Discharge recommendations:  home   Barriers to Discharge:  None    Time Tracking:     SLP Treatment Date:   03/29/19  Speech Start Time:  " 1100  Speech Stop Time:  1115     Speech Total Time (min):  15 min    Billable Minutes: Treatment Swallowing Dysfunction 15    Betsy Breaux CCC-SLP  03/29/2019

## 2019-03-29 NOTE — PROGRESS NOTES
[facility]  Infectious Disease  Progress Note    Patient Name: Kodi Ribeiro  MRN: [id number]  Admission Date: 3/27/2019  Length of Stay: 2 days  Attending Physician: Stefan Leary MD  Primary Care Provider: Norma Nuñez MD    Isolation Status: No active isolations  Assessment/Plan:      Meningitis  3/28- CSF reviewed -consistent with meningitis , this can be bacterial versus viral .    Will continue present antimicrobial therapy but will need cultures to adjust therapy .  Will continue Acyclovir, continue Rocephin, Vancomycin, Ampicillin     MARCELA (acute kidney injury)  Will closely monitor serum creatinine.    Hypomagnesemia  3/27- will replete and will monitor closely     Essential hypertension  BP control as per primary team        Anticipated Disposition:   (late entry note)  Thank you for your consult. I will follow-up with patient. Please contact us if you have any additional questions.    Lavell Waldrop MD  Infectious Disease  [facility]    Subjective:     Principal Problem:Acute encephalopathy    HPI: 69-year-old male who was admitted with history of neck pain and fever . Since admission, he was noted to have worsening confusion . CT scan of the head and cervical spine did not show any acute abnormality.  Lab data showed - creatinine of 4.8 and BUN of 4.2. CBC is significant for left shift -with seg of 93.  Family were in the room at this time.  Interval History: 69 year man with history of meningitis .   LP done showed-   CSF- wbc -360, lymph -40, seg 34  Review of Systems   Constitutional: Negative for activity change, appetite change, chills, diaphoresis and fatigue.   HENT: Negative for congestion, dental problem, ear discharge, ear pain and facial swelling.    Eyes: Negative for pain, discharge and itching.   Respiratory: Negative for apnea, cough, chest tightness and shortness of breath.    Cardiovascular: Negative for chest pain and leg swelling.    Gastrointestinal: Negative for abdominal distention and abdominal pain.   Endocrine: Negative for cold intolerance, heat intolerance and polydipsia.   Genitourinary: Negative for difficulty urinating, dysuria and enuresis.   Musculoskeletal: Negative for arthralgias and back pain.   Skin: Negative for color change and pallor.   Allergic/Immunologic: Negative for environmental allergies and food allergies.   Neurological: Negative for dizziness, facial asymmetry, light-headedness and headaches.   Hematological: Negative for adenopathy. Does not bruise/bleed easily.   Psychiatric/Behavioral: Negative for agitation and behavioral problems.     Objective:     Vital Signs (Most Recent):  Temp: 96.3 °F (35.7 °C) (03/29/19 0744)  Pulse: 76 (03/29/19 0807)  Resp: 18 (03/29/19 0807)  BP: (!) 168/85 (03/29/19 0744)  SpO2: 99 % (03/29/19 0807) Vital Signs (24h Range):  Temp:  [96.3 °F (35.7 °C)-98.7 °F (37.1 °C)] 96.3 °F (35.7 °C)  Pulse:  [70-80] 76  Resp:  [18] 18  SpO2:  [96 %-99 %] 99 %  BP: (123-168)/(58-85) 168/85     Weight: 87.1 kg (192 lb)  Body mass index is 25.33 kg/m².    Estimated Creatinine Clearance: 29.9 mL/min (A) (based on SCr of 2.6 mg/dL (H)).    Physical Exam   Constitutional: He appears well-developed and well-nourished.   HENT:   Head: Normocephalic and atraumatic.   Eyes: Pupils are equal, round, and reactive to light. EOM are normal.   Neck: Normal range of motion. No JVD present. Muscular tenderness present. Neck rigidity present.   Cardiovascular: Regular rhythm and intact distal pulses. Tachycardia present.   Murmur heard.   Systolic murmur is present with a grade of 3/6.  Pulmonary/Chest: Effort normal. No respiratory distress. He has no wheezes. He has rhonchi in the right middle field.   Abdominal: Soft. Bowel sounds are normal.   Musculoskeletal: Normal range of motion. He exhibits no deformity.   Neurological: He is alert. He has normal reflexes.   Skin: Skin is warm and dry. Capillary refill  takes 2 to 3 seconds.   Nursing note and vitals reviewed.      Significant Labs:   Blood Culture: No results for input(s): LABBLOO in the last 4320 hours.  BMP:   Recent Labs   Lab 03/29/19  0535   *      K 3.5      CO2 13*   BUN 61*   CREATININE 2.6*   CALCIUM 8.7     CBC:   Recent Labs   Lab 03/28/19  0502 03/29/19  0535   WBC 6.80 7.49   HGB 8.9* 8.4*   HCT 26.5* 25.1*   * 135*     All pertinent labs within the past 24 hours have been reviewed.    Significant Imaging: U/S: I have reviewed all pertinent results/findings within the past 24 hours:   I have reviewed all pertinent imaging results/findings within the past 24 hours.

## 2019-03-29 NOTE — SUBJECTIVE & OBJECTIVE
Interval History: pt was seen and examined. No new issues, stable last night. No new c/o's, no SOB. Discussed the medical issues with pt's wife.    Review of patient's allergies indicates:   Allergen Reactions    Morphine Itching     Current Facility-Administered Medications   Medication Frequency    acetaminophen tablet 650 mg Q8H PRN    acyclovir (ZOVIRAX) 870 mg in dextrose 5 % 250 mL IVPB Daily    albuterol-ipratropium 2.5 mg-0.5 mg/3 mL nebulizer solution 3 mL Q8H    amLODIPine tablet 10 mg Daily    ampicillin (OMNIPEN) 4 g in  mL EXTENDED INFUSION Q8H    carvedilol tablet 12.5 mg BID WM    cefTRIAXone (ROCEPHIN) 2 g in dextrose 5 % 50 mL IVPB Q12H    docusate sodium capsule 100 mg BID    guaiFENesin 12 hr tablet 600 mg BID    heparin (porcine) injection 5,000 Units Q8H    isosorbide mononitrate 24 hr tablet 60 mg Daily    pantoprazole injection 40 mg Daily    sodium chloride 0.45% 1,000 mL with sodium acetate 75 mEq infusion Continuous    [START ON 3/31/2019] Vancomycin 1.5 gm PLACEHOLDER D5W 100 ml IVPB Q72H       Objective:     Vital Signs (Most Recent):  Temp: 96.3 °F (35.7 °C) (03/29/19 0744)  Pulse: 76 (03/29/19 0807)  Resp: 18 (03/29/19 0807)  BP: (!) 168/85 (03/29/19 0744)  SpO2: 99 % (03/29/19 0807)  O2 Device (Oxygen Therapy): room air (03/29/19 0807) Vital Signs (24h Range):  Temp:  [96.3 °F (35.7 °C)-98.7 °F (37.1 °C)] 96.3 °F (35.7 °C)  Pulse:  [70-80] 76  Resp:  [18] 18  SpO2:  [96 %-99 %] 99 %  BP: (123-168)/(58-85) 168/85     Weight: 87.1 kg (192 lb) (03/27/19 0235)  Body mass index is 25.33 kg/m².  Body surface area is 2.12 meters squared.    I/O last 3 completed shifts:  In: 3455.8 [P.O.:120; I.V.:1685.8; IV Piggyback:1650]  Out: 300 [Urine:300]    Physical Exam   Constitutional: He is oriented to person, place, and time. He appears well-developed and well-nourished. No distress.   HENT:   Head: Normocephalic and atraumatic.   Neck: No JVD present.   Cardiovascular: Normal  rate, regular rhythm and normal heart sounds. Exam reveals no friction rub.   Pulmonary/Chest: Effort normal and breath sounds normal. He has no rales.   Abdominal: Soft. Bowel sounds are normal. He exhibits no distension. There is no tenderness. There is no guarding.   Musculoskeletal: He exhibits tenderness. He exhibits no edema.   Tense neck   Neurological: He is alert and oriented to person, place, and time.   Skin: Skin is warm and dry.   Psychiatric: He has a normal mood and affect. His behavior is normal.   Nursing note and vitals reviewed.      Significant Labs: reviewed  BMP  Lab Results   Component Value Date     03/29/2019    K 3.5 03/29/2019     03/29/2019    CO2 13 (L) 03/29/2019    BUN 61 (H) 03/29/2019    CREATININE 2.6 (H) 03/29/2019    CALCIUM 8.7 03/29/2019    ANIONGAP 13 03/29/2019    ESTGFRAFRICA 28 (A) 03/29/2019    EGFRNONAA 24 (A) 03/29/2019     Lab Results   Component Value Date    WBC 7.49 03/29/2019    HGB 8.4 (L) 03/29/2019    HCT 25.1 (L) 03/29/2019    MCV 87 03/29/2019     (L) 03/29/2019

## 2019-03-30 LAB
ANION GAP SERPL CALC-SCNC: 13 MMOL/L (ref 8–16)
BASOPHILS # BLD AUTO: 0 K/UL (ref 0–0.2)
BASOPHILS NFR BLD: 0 % (ref 0–1.9)
BUN SERPL-MCNC: 54 MG/DL (ref 8–23)
CALCIUM SERPL-MCNC: 8.5 MG/DL (ref 8.7–10.5)
CHLORIDE SERPL-SCNC: 109 MMOL/L (ref 95–110)
CO2 SERPL-SCNC: 15 MMOL/L (ref 23–29)
CREAT SERPL-MCNC: 2.1 MG/DL (ref 0.5–1.4)
DIFFERENTIAL METHOD: ABNORMAL
EOSINOPHIL # BLD AUTO: 0 K/UL (ref 0–0.5)
EOSINOPHIL NFR BLD: 0 % (ref 0–8)
ERYTHROCYTE [DISTWIDTH] IN BLOOD BY AUTOMATED COUNT: 15.1 % (ref 11.5–14.5)
EST. GFR  (AFRICAN AMERICAN): 36 ML/MIN/1.73 M^2
EST. GFR  (NON AFRICAN AMERICAN): 31 ML/MIN/1.73 M^2
GLUCOSE SERPL-MCNC: 173 MG/DL (ref 70–110)
HCT VFR BLD AUTO: 28 % (ref 40–54)
HGB BLD-MCNC: 9 G/DL (ref 14–18)
HSV1, PCR, CSF: NEGATIVE
HSV2, PCR, CSF: NEGATIVE
LYMPHOCYTES # BLD AUTO: 0.4 K/UL (ref 1–4.8)
LYMPHOCYTES NFR BLD: 7.8 % (ref 18–48)
MCH RBC QN AUTO: 27.9 PG (ref 27–31)
MCHC RBC AUTO-ENTMCNC: 32.1 G/DL (ref 32–36)
MCV RBC AUTO: 87 FL (ref 82–98)
MONOCYTES # BLD AUTO: 0.2 K/UL (ref 0.3–1)
MONOCYTES NFR BLD: 3.1 % (ref 4–15)
NEUTROPHILS # BLD AUTO: 4.8 K/UL (ref 1.8–7.7)
NEUTROPHILS NFR BLD: 89.1 % (ref 38–73)
PLATELET # BLD AUTO: 162 K/UL (ref 150–350)
PMV BLD AUTO: 10.3 FL (ref 9.2–12.9)
POCT GLUCOSE: 204 MG/DL (ref 70–110)
POTASSIUM SERPL-SCNC: 3.9 MMOL/L (ref 3.5–5.1)
PROCALCITONIN SERPL IA-MCNC: 2.92 NG/ML
RBC # BLD AUTO: 3.23 M/UL (ref 4.6–6.2)
SODIUM SERPL-SCNC: 137 MMOL/L (ref 136–145)
VANCOMYCIN SERPL-MCNC: 16 UG/ML
WBC # BLD AUTO: 5.41 K/UL (ref 3.9–12.7)

## 2019-03-30 PROCEDURE — 80048 BASIC METABOLIC PNL TOTAL CA: CPT | Mod: HCNC

## 2019-03-30 PROCEDURE — 96372 THER/PROPH/DIAG INJ SC/IM: CPT | Mod: HCNC

## 2019-03-30 PROCEDURE — 99900035 HC TECH TIME PER 15 MIN (STAT): Mod: HCNC

## 2019-03-30 PROCEDURE — 63600175 PHARM REV CODE 636 W HCPCS: Mod: HCNC | Performed by: INTERNAL MEDICINE

## 2019-03-30 PROCEDURE — 21400001 HC TELEMETRY ROOM: Mod: HCNC

## 2019-03-30 PROCEDURE — 94640 AIRWAY INHALATION TREATMENT: CPT | Mod: HCNC

## 2019-03-30 PROCEDURE — 84145 PROCALCITONIN (PCT): CPT | Mod: HCNC

## 2019-03-30 PROCEDURE — 25000003 PHARM REV CODE 250: Mod: HCNC | Performed by: INTERNAL MEDICINE

## 2019-03-30 PROCEDURE — 94799 UNLISTED PULMONARY SVC/PX: CPT | Mod: HCNC

## 2019-03-30 PROCEDURE — 85025 COMPLETE CBC W/AUTO DIFF WBC: CPT | Mod: HCNC

## 2019-03-30 PROCEDURE — 36415 COLL VENOUS BLD VENIPUNCTURE: CPT | Mod: HCNC

## 2019-03-30 PROCEDURE — 25000242 PHARM REV CODE 250 ALT 637 W/ HCPCS: Mod: HCNC | Performed by: NURSE PRACTITIONER

## 2019-03-30 PROCEDURE — 25000003 PHARM REV CODE 250: Mod: HCNC | Performed by: HOSPITALIST

## 2019-03-30 PROCEDURE — 92526 ORAL FUNCTION THERAPY: CPT | Mod: HCNC

## 2019-03-30 PROCEDURE — 80202 ASSAY OF VANCOMYCIN: CPT | Mod: HCNC

## 2019-03-30 PROCEDURE — 97110 THERAPEUTIC EXERCISES: CPT | Mod: HCNC

## 2019-03-30 PROCEDURE — 25000003 PHARM REV CODE 250: Mod: HCNC | Performed by: NURSE PRACTITIONER

## 2019-03-30 PROCEDURE — 99233 PR SUBSEQUENT HOSPITAL CARE,LEVL III: ICD-10-PCS | Mod: HCNC,,, | Performed by: INTERNAL MEDICINE

## 2019-03-30 PROCEDURE — 63600175 PHARM REV CODE 636 W HCPCS: Mod: HCNC | Performed by: NURSE PRACTITIONER

## 2019-03-30 PROCEDURE — 99233 SBSQ HOSP IP/OBS HIGH 50: CPT | Mod: HCNC,,, | Performed by: INTERNAL MEDICINE

## 2019-03-30 RX ORDER — TRAMADOL HYDROCHLORIDE 50 MG/1
50 TABLET ORAL EVERY 6 HOURS PRN
Status: DISCONTINUED | OUTPATIENT
Start: 2019-03-30 | End: 2019-04-04 | Stop reason: HOSPADM

## 2019-03-30 RX ORDER — PANTOPRAZOLE SODIUM 40 MG/1
40 TABLET, DELAYED RELEASE ORAL DAILY
Status: DISCONTINUED | OUTPATIENT
Start: 2019-03-30 | End: 2019-04-04 | Stop reason: HOSPADM

## 2019-03-30 RX ORDER — CARVEDILOL 12.5 MG/1
25 TABLET ORAL 2 TIMES DAILY WITH MEALS
Status: DISCONTINUED | OUTPATIENT
Start: 2019-03-30 | End: 2019-04-04 | Stop reason: HOSPADM

## 2019-03-30 RX ORDER — SODIUM BICARBONATE 650 MG/1
650 TABLET ORAL 3 TIMES DAILY
Status: DISCONTINUED | OUTPATIENT
Start: 2019-03-30 | End: 2019-04-04 | Stop reason: HOSPADM

## 2019-03-30 RX ADMIN — DOCUSATE SODIUM 100 MG: 100 CAPSULE, LIQUID FILLED ORAL at 08:03

## 2019-03-30 RX ADMIN — TRAMADOL HYDROCHLORIDE 50 MG: 50 TABLET, COATED ORAL at 10:03

## 2019-03-30 RX ADMIN — RAMELTEON 8 MG: 8 TABLET, FILM COATED ORAL at 10:03

## 2019-03-30 RX ADMIN — HEPARIN SODIUM 5000 UNITS: 5000 INJECTION, SOLUTION INTRAVENOUS; SUBCUTANEOUS at 02:03

## 2019-03-30 RX ADMIN — METHYLPREDNISOLONE SODIUM SUCCINATE 60 MG: 40 INJECTION, POWDER, FOR SOLUTION INTRAMUSCULAR; INTRAVENOUS at 04:03

## 2019-03-30 RX ADMIN — ACYCLOVIR SODIUM 870 MG: 1000 INJECTION, SOLUTION INTRAVENOUS at 11:03

## 2019-03-30 RX ADMIN — IPRATROPIUM BROMIDE AND ALBUTEROL SULFATE 3 ML: .5; 3 SOLUTION RESPIRATORY (INHALATION) at 12:03

## 2019-03-30 RX ADMIN — SODIUM BICARBONATE 650 MG TABLET 650 MG: at 04:03

## 2019-03-30 RX ADMIN — ISOSORBIDE MONONITRATE 60 MG: 60 TABLET, EXTENDED RELEASE ORAL at 09:03

## 2019-03-30 RX ADMIN — CEFTRIAXONE 2 G: 2 INJECTION, SOLUTION INTRAVENOUS at 05:03

## 2019-03-30 RX ADMIN — AMPICILLIN 4000 MG: 2 INJECTION, POWDER, FOR SOLUTION INTRAVENOUS at 06:03

## 2019-03-30 RX ADMIN — HEPARIN SODIUM 5000 UNITS: 5000 INJECTION, SOLUTION INTRAVENOUS; SUBCUTANEOUS at 05:03

## 2019-03-30 RX ADMIN — CARVEDILOL 12.5 MG: 12.5 TABLET, FILM COATED ORAL at 07:03

## 2019-03-30 RX ADMIN — AMLODIPINE BESYLATE 10 MG: 10 TABLET ORAL at 09:03

## 2019-03-30 RX ADMIN — HEPARIN SODIUM 5000 UNITS: 5000 INJECTION, SOLUTION INTRAVENOUS; SUBCUTANEOUS at 10:03

## 2019-03-30 RX ADMIN — PANTOPRAZOLE SODIUM 40 MG: 40 TABLET, DELAYED RELEASE ORAL at 09:03

## 2019-03-30 RX ADMIN — GUAIFENESIN 600 MG: 600 TABLET, EXTENDED RELEASE ORAL at 08:03

## 2019-03-30 RX ADMIN — ACETAMINOPHEN 650 MG: 325 TABLET ORAL at 01:03

## 2019-03-30 RX ADMIN — ACETAMINOPHEN 650 MG: 325 TABLET ORAL at 06:03

## 2019-03-30 RX ADMIN — IPRATROPIUM BROMIDE AND ALBUTEROL SULFATE 3 ML: .5; 3 SOLUTION RESPIRATORY (INHALATION) at 07:03

## 2019-03-30 RX ADMIN — DOCUSATE SODIUM 100 MG: 100 CAPSULE, LIQUID FILLED ORAL at 09:03

## 2019-03-30 RX ADMIN — ACETAMINOPHEN 650 MG: 325 TABLET ORAL at 10:03

## 2019-03-30 RX ADMIN — IPRATROPIUM BROMIDE AND ALBUTEROL SULFATE 3 ML: .5; 3 SOLUTION RESPIRATORY (INHALATION) at 04:03

## 2019-03-30 RX ADMIN — CARVEDILOL 25 MG: 12.5 TABLET, FILM COATED ORAL at 05:03

## 2019-03-30 RX ADMIN — SODIUM BICARBONATE 650 MG TABLET 650 MG: at 08:03

## 2019-03-30 RX ADMIN — METHYLPREDNISOLONE SODIUM SUCCINATE 80 MG: 40 INJECTION, POWDER, FOR SOLUTION INTRAMUSCULAR; INTRAVENOUS at 12:03

## 2019-03-30 RX ADMIN — IPRATROPIUM BROMIDE AND ALBUTEROL SULFATE 3 ML: .5; 3 SOLUTION RESPIRATORY (INHALATION) at 11:03

## 2019-03-30 RX ADMIN — GUAIFENESIN 600 MG: 600 TABLET, EXTENDED RELEASE ORAL at 09:03

## 2019-03-30 NOTE — PT/OT/SLP PROGRESS
Physical Therapy Treatment    Patient Name:  Kodi Ribeiro   MRN:  [id number]    Recommendations:     Discharge Recommendations:  home   Discharge Equipment Recommendations: none   Barriers to discharge: None    Assessment:     Improved functional mobility and transfers    Rehab Prognosis: Good; patient would benefit from acute skilled PT services to address these deficits and reach maximum level of function.    Recent Surgery: * No surgery found *      Plan:     During this hospitalization, patient to be seen 5 x/week to address the identified rehab impairments via gait training, therapeutic activities, therapeutic exercises and progress toward the following goals:    · Plan of Care Expires:  04/03/19    Subjective     Chief Complaint: neck pain  Patient/Family Comments/goals: get back home  Pain/Comfort:  · Pain Rating 1: 5/10  · Location 1: neck  · Pain Addressed 2: Reposition      Objective:     Communicated with nursing prior to session.  Patient found supine with peripheral IV, telemetry upon PT entry to room.     General Precautions: Standard, fall   Orthopedic Precautions:N/A   Braces:       Functional Mobility:  · Bed Mobility:     · Supine to Sit: stand by assistance      AM-PAC 6 CLICK MOBILITY  Turning over in bed (including adjusting bedclothes, sheets and blankets)?: 4  Sitting down on and standing up from a chair with arms (e.g., wheelchair, bedside commode, etc.): 1  Moving from lying on back to sitting on the side of the bed?: 4  Moving to and from a bed to a chair (including a wheelchair)?: 1  Need to walk in hospital room?: 1  Climbing 3-5 steps with a railing?: 1  Basic Mobility Total Score: 12       Therapeutic Activities and Exercises:   LE strengthening: MRE- knee extension, knee flexion, Hip ABD/ADD 2 x 10 reps each    Patient left supine with all lines intact, call button in reach and wife present..    GOALS:   Multidisciplinary Problems     Physical Therapy Goals        Problem: Physical  Therapy Goal    Goal Priority Disciplines Outcome Goal Variances Interventions   Physical Therapy Goal     PT, PT/OT Ongoing (interventions implemented as appropriate)     Description:  LTG'S TO BE MET IN 7 DAYS (4-3-19)  1. PT WILL [facility] FOR BED MOBILITY  2. PT WILL REQUIRE [facility] FOR TF'S  3. PT WILL TOLERATED BLE THEREX X 20 REPS                      Time Tracking:     PT Received On: 03/30/19  PT Start Time: 0800     PT Stop Time: 0815  PT Total Time (min): 15 min     Billable Minutes: Therapeutic Exercise 15       PT/PTA: PT           Barry Doss, PT  03/30/2019

## 2019-03-30 NOTE — ASSESSMENT & PLAN NOTE
3/28- CSF reviewed -consistent with meningitis , this can be bacterial versus viral .    Will continue present antimicrobial therapy but will need cultures to adjust therapy .  Will continue Acyclovir, continue Rocephin, Vancomycin, Ampicillin     3/29- will stop vancomycin in AM, all cultures remain negative till date (was already on antibiotics prior to LP), , will continue Rocephin , vancomycin and acyclovir for now.

## 2019-03-30 NOTE — SUBJECTIVE & OBJECTIVE
Interval History:  Mental status continues interval improvement.  Hemodynamically stable.    Review of Systems   Unable to perform ROS: Mental status change   Constitutional: Positive for activity change. Negative for fatigue.   Neurological: Positive for weakness.   Psychiatric/Behavioral: Negative for agitation and confusion.     Objective:     Vital Signs (Most Recent):  Temp: 97.7 °F (36.5 °C) (03/29/19 1909)  Pulse: 81 (03/29/19 1909)  Resp: 19 (03/29/19 1909)  BP: (!) 155/71 (03/29/19 1909)  SpO2: 97 % (03/29/19 1909) Vital Signs (24h Range):  Temp:  [96.3 °F (35.7 °C)-98.7 °F (37.1 °C)] 97.7 °F (36.5 °C)  Pulse:  [70-81] 81  Resp:  [18-20] 19  SpO2:  [95 %-99 %] 97 %  BP: (124-168)/(58-85) 155/71     Weight: 87.1 kg (192 lb)  Body mass index is 25.33 kg/m².    Intake/Output Summary (Last 24 hours) at 3/29/2019 2023  Last data filed at 3/29/2019 1900  Gross per 24 hour   Intake 1932.5 ml   Output --   Net 1932.5 ml      Physical Exam   Constitutional: He appears well-developed and well-nourished.   HENT:   Head: Normocephalic and atraumatic.   Eyes: Pupils are equal, round, and reactive to light. EOM are normal.   Neck: Normal range of motion. No JVD present. Muscular tenderness present. Neck rigidity present.   Cardiovascular: Regular rhythm and intact distal pulses. Tachycardia present.   Murmur heard.   Systolic murmur is present with a grade of 3/6.  Pulmonary/Chest: Effort normal. No respiratory distress. He has no wheezes. He has rhonchi in the right middle field.   Abdominal: Soft. Bowel sounds are normal.   Musculoskeletal: Normal range of motion. He exhibits no deformity.   Neurological: He is alert. He has normal reflexes.   Skin: Skin is warm and dry. Capillary refill takes 2 to 3 seconds.   Nursing note and vitals reviewed.      Significant Labs:   ABGs: No results for input(s): PH, PCO2, HCO3, POCSATURATED, BE, TOTALHB, COHB, METHB, O2HB, POCFIO2 in the last 48 hours.  Blood Culture: No results  for input(s): LABBLOO in the last 48 hours.  BMP:   Recent Labs   Lab 03/29/19  0535   *      K 3.5      CO2 13*   BUN 61*   CREATININE 2.6*   CALCIUM 8.7     CBC:   Recent Labs   Lab 03/28/19  0502 03/29/19  0535   WBC 6.80 7.49   HGB 8.9* 8.4*   HCT 26.5* 25.1*   * 135*     CMP:   Recent Labs   Lab 03/28/19  0501 03/29/19  0535    136   K 3.9 3.5   * 110   CO2 12* 13*   * 160*   BUN 65* 61*   CREATININE 3.6* 2.6*   CALCIUM 9.0 8.7   ANIONGAP 12 13   EGFRNONAA 16* 24*     Troponin:   No results for input(s): TROPONINI in the last 48 hours.  Urine Studies:   No results for input(s): COLORU, APPEARANCEUA, PHUR, SPECGRAV, PROTEINUA, GLUCUA, KETONESU, BILIRUBINUA, OCCULTUA, NITRITE, UROBILINOGEN, LEUKOCYTESUR, RBCUA, WBCUA, BACTERIA, SQUAMEPITHEL, HYALINECASTS in the last 48 hours.    Invalid input(s): WRIGHTSUR  All pertinent labs within the past 24 hours have been reviewed.    Significant Imaging: I have reviewed all pertinent imaging results/findings within the past 24 hours.

## 2019-03-30 NOTE — PROGRESS NOTES
[facility]  Infectious Disease  Progress Note    Patient Name: Kodi Ribeiro  MRN: [id number]  Admission Date: 3/27/2019  Length of Stay: 3 days  Attending Physician: Stefan Leary MD  Primary Care Provider: Norma Nuñez MD    Isolation Status: No active isolations  Assessment/Plan:      * Acute encephalopathy  3/27- with fever , meningitis is of concern, will plan for LP, will start empiric therapy for meningitis with IV Rocephin 2gram bid, vancomycin, ampicillin and acyclovir .  Will use LP to adjust therapy .  Send message to Radiology -Dr Curtis about LP in AM as Plavix was taken over the last weekend.      3/29- now improving     Meningitis  3/28- CSF reviewed -consistent with meningitis , this can be bacterial versus viral .    Will continue present antimicrobial therapy but will need cultures to adjust therapy .  Will continue Acyclovir, continue Rocephin, Vancomycin, Ampicillin     3/29- will stop vancomycin in AM, all cultures remain negative till date (was already on antibiotics prior to LP), , will continue Rocephin , vancomycin and acyclovir for now.      Essential hypertension  BP control as per primary team        Anticipated Disposition:     Thank you for your consult. I will follow-up with patient. Please contact us if you have any additional questions.    Lavell Waldrop MD  Infectious Disease  [facility]    Subjective:     Principal Problem:Acute encephalopathy    HPI: 69-year-old male who was admitted with history of neck pain and fever . Since admission, he was noted to have worsening confusion . CT scan of the head and cervical spine did not show any acute abnormality.  Lab data showed - creatinine of 4.8 and BUN of 4.2. CBC is significant for left shift -with seg of 93.  Family were in the room at this time.  Interval History:   03/29 -he is more awake and alert today ,cultures-pending , wife is in the room at this time.    Review of Systems    Constitutional: Negative for activity change, appetite change, chills, diaphoresis and fatigue.   HENT: Negative for congestion, dental problem, ear discharge, ear pain and facial swelling.    Eyes: Negative for pain, discharge and itching.   Respiratory: Negative for apnea, cough, chest tightness and shortness of breath.    Cardiovascular: Negative for chest pain and leg swelling.   Gastrointestinal: Negative for abdominal distention and abdominal pain.   Endocrine: Negative for cold intolerance, heat intolerance and polydipsia.   Genitourinary: Negative for difficulty urinating, dysuria and enuresis.   Musculoskeletal: Negative for arthralgias and back pain.   Skin: Negative for color change and pallor.   Allergic/Immunologic: Negative for environmental allergies and food allergies.   Neurological: Negative for dizziness, facial asymmetry, light-headedness and headaches.   Hematological: Negative for adenopathy. Does not bruise/bleed easily.   Psychiatric/Behavioral: Negative for agitation and behavioral problems.     Objective:     Vital Signs (Most Recent):  Temp: 97.7 °F (36.5 °C) (03/30/19 0801)  Pulse: 77 (03/30/19 0801)  Resp: 18 (03/30/19 0801)  BP: (!) 167/71 (03/30/19 0801)  SpO2: (!) 94 % (03/30/19 0801) Vital Signs (24h Range):  Temp:  [97.4 °F (36.3 °C)-97.8 °F (36.6 °C)] 97.7 °F (36.5 °C)  Pulse:  [66-81] 77  Resp:  [16-20] 18  SpO2:  [92 %-97 %] 94 %  BP: (131-167)/(59-73) 167/71     Weight: 87.1 kg (192 lb)  Body mass index is 25.33 kg/m².    Estimated Creatinine Clearance: 37 mL/min (A) (based on SCr of 2.1 mg/dL (H)).    Physical Exam   Constitutional: He appears well-developed and well-nourished.   HENT:   Head: Normocephalic and atraumatic.   Eyes: Pupils are equal, round, and reactive to light. EOM are normal.   Neck: Normal range of motion. No JVD present. Muscular tenderness present. Neck rigidity present.   Cardiovascular: Regular rhythm and intact distal pulses. Tachycardia present.    Murmur heard.   Systolic murmur is present with a grade of 3/6.  Pulmonary/Chest: Effort normal. No respiratory distress. He has no wheezes. He has rhonchi in the right middle field.   Abdominal: Soft. Bowel sounds are normal.   Musculoskeletal: Normal range of motion. He exhibits no deformity.   Neurological: He is alert. He has normal reflexes.   Skin: Skin is warm and dry. Capillary refill takes 2 to 3 seconds.   Nursing note and vitals reviewed.       Significant Labs:   CBC:   Recent Labs   Lab 03/29/19  0535 03/30/19  0507   WBC 7.49 5.41   HGB 8.4* 9.0*   HCT 25.1* 28.0*   * 162     All pertinent labs within the past 24 hours have been reviewed.    Significant Imaging: I have reviewed all pertinent imaging results/findings within the past 24 hours.

## 2019-03-30 NOTE — ASSESSMENT & PLAN NOTE
3/27- with fever , meningitis is of concern, will plan for LP, will start empiric therapy for meningitis with IV Rocephin 2gram bid, vancomycin, ampicillin and acyclovir .  Will use LP to adjust therapy .  Send message to Radiology -Dr Curtis about LP in AM as Plavix was taken over the last weekend.      3/29- now improving

## 2019-03-30 NOTE — PLAN OF CARE
Problem: Adult Inpatient Plan of Care  Goal: Plan of Care Review  Outcome: Ongoing (interventions implemented as appropriate)  Pt c/o neck pain/stiffness radiating to back. PRN Tylenol administered and one time dose of solumedrol received this shift. Slight relief obtained. Oriented x4. IV abx infusing. NSR on the monitor. Turning independently. VSS. Chart reviewed. Will monitor.

## 2019-03-30 NOTE — PT/OT/SLP PROGRESS
Speech Language Pathology Treatment    Patient Name:  Kodi Ribeiro   MRN:  [id number]  Admitting Diagnosis: Acute encephalopathy    Recommendations:                 General Recommendations:  Aspiration precautions  Diet recommendations:  Mechanical soft, Liquid Diet Level: Thin   Aspiration Precautions: in place   General Precautions: Standard, fall  Communication strategies:  verbal    Subjective     Pt sitting up in bed  Patient goals: to get better to go home    Pain/Comfort:  ·  0/10    Objective:     Has the patient been evaluated by SLP for swallowing?      Keep patient NPO?     Current Respiratory Status: room air      Pt completed 15 reps of oral motor, pharyngeal and laryngeal exercises with min cues.   Swallowing precautions reviewed and pt reported swallowing has improved.  Pt was oriented to person, place, time and situation.   He followed basic commands and answered basic yes/no questions appropriately.    Assessment:     Kodi Ribeiro is a 69 y.o. male who tolerated S.T. Well today.   He tolerated all tasks and reporting that his swallowing and cognition have improved.   Swallow precautions reviewed with pt and daughter.    Goals:   Multidisciplinary Problems     SLP Goals        Problem: SLP Goal    Goal Priority Disciplines Outcome   SLP Goal     SLP Ongoing (interventions implemented as appropriate)   Description:  1. Ongoing swallow assessment for safety of least restrictive diet.  2. Pt will complete oral motor/pharyngeal/laryngeal ex x 15 each with mod cues to improve swallow function.                     Plan:     · Patient to be seen:  2 x/week   · Plan of Care expires:  04/04/19  · Plan of Care reviewed with:  patient, family   · SLP Follow-Up:  Yes       Discharge recommendations:  home   Barriers to Discharge:      Time Tracking:     SLP Treatment Date:    03/30/19  Speech Start Time:   1220  Speech Stop Time:    1240    Speech Total Time (min):   20    Billable Minutes: 20  destiney Masterson CCC-SLP  03/30/2019

## 2019-03-30 NOTE — PROGRESS NOTES
[facility] -   Nephrology  Progress Note    Patient Name: Kodi Ribeiro  MRN: [id number]  Admission Date: 3/27/2019  Hospital Length of Stay: 3 days  Attending Provider: Stefan Leary MD   Primary Care Physician: Norma Nuñez MD  Principal Problem:Acute encephalopathy    Subjective:     HPI: Pt was seen and examined. H/o and chart reviewed. Pt is a 68 y/o male with ah/o of HTN and CKD stage 4, who presented with neck pain. W/u was reviewed. CT neck and head unremarkable. Pt did not taje his BP meds while experiencing neck pain. BP elevated. s Cr has worsened form baseline to 4.8, repeat 4.2. Pt's wife says that he takes ibuprofen. According to her he has taken 1-3 tablets 2-3 times a day. She actually says that on some days he has taken 9 per day. Pt has no other c/o's, no sx's of dysuria, no GI losses.    Interval History: pt was seen and examined. No new issues, stable last night. No new c/o's, no SOB. Discussed the medical issues with pt's wife., dtr.     Review of patient's allergies indicates:   Allergen Reactions    Morphine Itching     Current Facility-Administered Medications   Medication Frequency    acetaminophen tablet 650 mg Q8H PRN    acyclovir (ZOVIRAX) 870 mg in dextrose 5 % 250 mL IVPB Daily    albuterol-ipratropium 2.5 mg-0.5 mg/3 mL nebulizer solution 3 mL Q8H    amLODIPine tablet 10 mg Daily    carvedilol tablet 25 mg BID WM    cefTRIAXone (ROCEPHIN) 2 g in dextrose 5 % 50 mL IVPB Q12H    docusate sodium capsule 100 mg BID    guaiFENesin 12 hr tablet 600 mg BID    heparin (porcine) injection 5,000 Units Q8H    isosorbide mononitrate 24 hr tablet 60 mg Daily    methylPREDNISolone sodium succinate injection 60 mg Once    pantoprazole EC tablet 40 mg Daily    ramelteon tablet 8 mg Nightly PRN    sodium bicarbonate tablet 650 mg TID    traMADol tablet 50 mg Q6H PRN       Objective:     Vital Signs (Most Recent):  Temp: 97.7 °F (36.5 °C) (03/30/19 0801)  Pulse: 77  (03/30/19 0801)  Resp: 18 (03/30/19 0801)  BP: (!) 167/71 (03/30/19 0801)  SpO2: (!) 94 % (03/30/19 0801)  O2 Device (Oxygen Therapy): room air (03/30/19 0801) Vital Signs (24h Range):  Temp:  [97.4 °F (36.3 °C)-97.8 °F (36.6 °C)] 97.7 °F (36.5 °C)  Pulse:  [66-81] 77  Resp:  [16-20] 18  SpO2:  [92 %-97 %] 94 %  BP: (135-167)/(62-73) 167/71     Weight: 87.1 kg (192 lb) (03/27/19 0235)  Body mass index is 25.33 kg/m².  Body surface area is 2.12 meters squared.    I/O last 3 completed shifts:  In: 2832.5 [P.O.:1080; I.V.:552.5; IV Piggyback:1200]  Out: -     Physical Exam   Constitutional: He is oriented to person, place, and time. He appears well-developed and well-nourished. No distress.   HENT:   Head: Normocephalic and atraumatic.   Neck: No JVD present.   Cardiovascular: Normal rate, regular rhythm and normal heart sounds. Exam reveals no friction rub.   Pulmonary/Chest: Effort normal and breath sounds normal. He has no rales.   Abdominal: Soft. Bowel sounds are normal. He exhibits no distension. There is no tenderness. There is no guarding.   Musculoskeletal: He exhibits no edema.   Tense neck   Neurological: He is alert and oriented to person, place, and time.   Skin: Skin is warm and dry.   Psychiatric: He has a normal mood and affect. His behavior is normal.   Nursing note and vitals reviewed.      Significant Labs: reviewed  BMP  Lab Results   Component Value Date     03/30/2019    K 3.9 03/30/2019     03/30/2019    CO2 15 (L) 03/30/2019    BUN 54 (H) 03/30/2019    CREATININE 2.1 (H) 03/30/2019    CALCIUM 8.5 (L) 03/30/2019    ANIONGAP 13 03/30/2019    ESTGFRAFRICA 36 (A) 03/30/2019    EGFRNONAA 31 (A) 03/30/2019     Lab Results   Component Value Date    WBC 5.41 03/30/2019    HGB 9.0 (L) 03/30/2019    HCT 28.0 (L) 03/30/2019    MCV 87 03/30/2019     03/30/2019     Lab Results   Component Value Date    .0 (H) 03/20/2019    CALCIUM 8.5 (L) 03/30/2019    PHOS 3.0 03/27/2019       Lab  Results   Component Value Date    ALBUMIN 2.8 (L) 03/27/2019           Assessment/Plan:     Acute renal failure superimposed on chronic kidney disease  1. MARCELA on CKD stage 4 :  Baseline serum creatinine about 2 mg/dL, acute kidney injury multifactorial, patient was taking NSAIDs prior to admission to the hospital, also volume depleted, renal function back to baseline at about 2 mg/dL with IV fluids,    2.  Hypertension - blood pressure still slightly elevated, will increase carvedilol from 12.5-25 mg twice a day, continue amlodipine, patient on IV steroids, can cause some hypertension,    3.  Altered mental status - ID notes reviewed, s/p lumbar puncture, CSF analysis pending, on broad-spectrum antibiotics,    4.  Anemia of chronic kidney disease - monitor hemoglobin, PRBC transfusion when indicated    5.  Stable lytes    6.  Metabolic acidosis - start sodium bicarbonate supplements.            Thank you for your consult. I will follow-up with patient. Please contact us if you have any additional questions.     Total time spent 40 minutes including time needed to review the records,  patient  evaluation, documentation, face-to-face discussion with the patient, family, primary team,     more than 50% of the time was spent on coordination of care and counseling.       Nilay Zambrano MD  Nephrology  [facility]

## 2019-03-30 NOTE — PLAN OF CARE
Problem: Physical Therapy Goal  Goal: Physical Therapy Goal  LTG'S TO BE MET IN 7 DAYS (4-3-19)  1. PT WILL VER[facility] FOR BED MOBILITY  2. PT WILL REQUIRE VER[facility] FOR TF'S  3. PT WILL TOLERATED BLE THEREX X 20 REPS     Outcome: Ongoing (interventions implemented as appropriate)  Pt willing to perform exercises in bed. Completed LE strengthening

## 2019-03-30 NOTE — ASSESSMENT & PLAN NOTE
Related to meds and high blood pressure suspected.  Resume home meds to control blood pressure.  CT of head negative for any acute findings at this time.  UA negative.  Try to avoid narcotics or any sedated or mood altering medications.  Neuro checks.  Consider Neurology consult pending course.  Infectious disease evaluated and following up recommendations.  Successful LP on 28 March with initial analysis indicating meningitis.  Continuing empiric treatment.

## 2019-03-30 NOTE — SUBJECTIVE & OBJECTIVE
Interval History: pt was seen and examined. No new issues, stable last night. No new c/o's, no SOB. Discussed the medical issues with pt's wife., dtr.     Review of patient's allergies indicates:   Allergen Reactions    Morphine Itching     Current Facility-Administered Medications   Medication Frequency    acetaminophen tablet 650 mg Q8H PRN    acyclovir (ZOVIRAX) 870 mg in dextrose 5 % 250 mL IVPB Daily    albuterol-ipratropium 2.5 mg-0.5 mg/3 mL nebulizer solution 3 mL Q8H    amLODIPine tablet 10 mg Daily    carvedilol tablet 25 mg BID WM    cefTRIAXone (ROCEPHIN) 2 g in dextrose 5 % 50 mL IVPB Q12H    docusate sodium capsule 100 mg BID    guaiFENesin 12 hr tablet 600 mg BID    heparin (porcine) injection 5,000 Units Q8H    isosorbide mononitrate 24 hr tablet 60 mg Daily    methylPREDNISolone sodium succinate injection 60 mg Once    pantoprazole EC tablet 40 mg Daily    ramelteon tablet 8 mg Nightly PRN    sodium bicarbonate tablet 650 mg TID    traMADol tablet 50 mg Q6H PRN       Objective:     Vital Signs (Most Recent):  Temp: 97.7 °F (36.5 °C) (03/30/19 0801)  Pulse: 77 (03/30/19 0801)  Resp: 18 (03/30/19 0801)  BP: (!) 167/71 (03/30/19 0801)  SpO2: (!) 94 % (03/30/19 0801)  O2 Device (Oxygen Therapy): room air (03/30/19 0801) Vital Signs (24h Range):  Temp:  [97.4 °F (36.3 °C)-97.8 °F (36.6 °C)] 97.7 °F (36.5 °C)  Pulse:  [66-81] 77  Resp:  [16-20] 18  SpO2:  [92 %-97 %] 94 %  BP: (135-167)/(62-73) 167/71     Weight: 87.1 kg (192 lb) (03/27/19 0235)  Body mass index is 25.33 kg/m².  Body surface area is 2.12 meters squared.    I/O last 3 completed shifts:  In: 2832.5 [P.O.:1080; I.V.:552.5; IV Piggyback:1200]  Out: -     Physical Exam   Constitutional: He is oriented to person, place, and time. He appears well-developed and well-nourished. No distress.   HENT:   Head: Normocephalic and atraumatic.   Neck: No JVD present.   Cardiovascular: Normal rate, regular rhythm and normal heart sounds.  Exam reveals no friction rub.   Pulmonary/Chest: Effort normal and breath sounds normal. He has no rales.   Abdominal: Soft. Bowel sounds are normal. He exhibits no distension. There is no tenderness. There is no guarding.   Musculoskeletal: He exhibits no edema.   Tense neck   Neurological: He is alert and oriented to person, place, and time.   Skin: Skin is warm and dry.   Psychiatric: He has a normal mood and affect. His behavior is normal.   Nursing note and vitals reviewed.      Significant Labs: reviewed  BMP  Lab Results   Component Value Date     03/30/2019    K 3.9 03/30/2019     03/30/2019    CO2 15 (L) 03/30/2019    BUN 54 (H) 03/30/2019    CREATININE 2.1 (H) 03/30/2019    CALCIUM 8.5 (L) 03/30/2019    ANIONGAP 13 03/30/2019    ESTGFRAFRICA 36 (A) 03/30/2019    EGFRNONAA 31 (A) 03/30/2019     Lab Results   Component Value Date    WBC 5.41 03/30/2019    HGB 9.0 (L) 03/30/2019    HCT 28.0 (L) 03/30/2019    MCV 87 03/30/2019     03/30/2019     Lab Results   Component Value Date    .0 (H) 03/20/2019    CALCIUM 8.5 (L) 03/30/2019    PHOS 3.0 03/27/2019       Lab Results   Component Value Date    ALBUMIN 2.8 (L) 03/27/2019

## 2019-03-30 NOTE — ASSESSMENT & PLAN NOTE
1. MARCELA on CKD stage 4 :  Baseline serum creatinine about 2 mg/dL, acute kidney injury multifactorial, patient was taking NSAIDs prior to admission to the hospital, also volume depleted, renal function back to baseline at about 2 mg/dL with IV fluids,    2.  Hypertension - blood pressure still slightly elevated, will increase carvedilol from 12.5-25 mg twice a day, continue amlodipine, patient on IV steroids, can cause some hypertension,    3.  Altered mental status - ID notes reviewed, s/p lumbar puncture, CSF analysis pending, on broad-spectrum antibiotics,    4.  Anemia of chronic kidney disease - monitor hemoglobin, PRBC transfusion when indicated    5.  Stable lytes    6.  Metabolic acidosis - start sodium bicarbonate supplements.

## 2019-03-30 NOTE — PLAN OF CARE
Problem: SLP Goal  Goal: SLP Goal  1. Ongoing swallow assessment for safety of least restrictive diet.  2. Pt will complete oral motor/pharyngeal/laryngeal ex x 15 each with mod cues to improve swallow function.        Pt completed S.T. To complete oral motor/pharyngeal/laryngeal exercises

## 2019-03-30 NOTE — PROGRESS NOTES
[facility] Hospital Medicine  Progress Note    Patient Name: Kodi Ribeiro  MRN: [id number]  Patient Class: IP- Inpatient   Admission Date: 3/27/2019  Length of Stay: 2 days  Attending Physician: Stefan Leary MD  Primary Care Provider: Norma Nuñez MD        Subjective:     Principal Problem:Acute encephalopathy    HPI:   69-year-old male patient with extensive medical history presents today with complaint of neck pain that started over the weekend.  Patient reports he was seen in the emergency room a couple days ago was given pain medication and muscle relaxer and discharged home.  Patient reports that treatment with outpatient medications not effective and continues to complain of this pain.  Range of motion of neck worsens.  Associated symptoms include some confusion and weakness endorsed by patient's wife at bedside.  Additionally patient's wife reports patient has not been taking his regular medicines over the last couple of days because she was worried only about his pain and treating that and forgot about his other medicines.  Patient was evaluated in the emergency room.   Significant labs creatinine of 4.8 and BUN of 4.2.  Per emergency room note  No acute findings on CT of cervical spine and CT head.   Hospital Medicine consult.  Patient placed in observation.    Hospital Course:  Patient admitted to hospital with CC Neck Pain. Patient placed on BS abx. Concern is for meningitis as patient continues with AMS, febrile episodes and c/o Neck Pain 10/10. Family at bedside. ID consulted. Patient covered broadly. Patient unable to obtain LP per IR due to his last receiving plavix on Sunday.  Dr Curtis has agreed to do LP Thursday. CT head negative and MRI unable to obtain due to patient uncooperativity / claustrophobia per family. Patient was responding to query correctly at time of exam. He was able to tell me where he is, the year, and the president. Family reports this is an  improvement from earlier today. POC discussed in detail.  28 March successful lumbar puncture.    Interval History:  Mental status continues interval improvement.  Hemodynamically stable.    Review of Systems   Unable to perform ROS: Mental status change   Constitutional: Positive for activity change. Negative for fatigue.   Neurological: Positive for weakness.   Psychiatric/Behavioral: Negative for agitation and confusion.     Objective:     Vital Signs (Most Recent):  Temp: 97.7 °F (36.5 °C) (03/29/19 1909)  Pulse: 81 (03/29/19 1909)  Resp: 19 (03/29/19 1909)  BP: (!) 155/71 (03/29/19 1909)  SpO2: 97 % (03/29/19 1909) Vital Signs (24h Range):  Temp:  [96.3 °F (35.7 °C)-98.7 °F (37.1 °C)] 97.7 °F (36.5 °C)  Pulse:  [70-81] 81  Resp:  [18-20] 19  SpO2:  [95 %-99 %] 97 %  BP: (124-168)/(58-85) 155/71     Weight: 87.1 kg (192 lb)  Body mass index is 25.33 kg/m².    Intake/Output Summary (Last 24 hours) at 3/29/2019 2023  Last data filed at 3/29/2019 1900  Gross per 24 hour   Intake 1932.5 ml   Output --   Net 1932.5 ml      Physical Exam   Constitutional: He appears well-developed and well-nourished.   HENT:   Head: Normocephalic and atraumatic.   Eyes: Pupils are equal, round, and reactive to light. EOM are normal.   Neck: Normal range of motion. No JVD present. Muscular tenderness present. Neck rigidity present.   Cardiovascular: Regular rhythm and intact distal pulses. Tachycardia present.   Murmur heard.   Systolic murmur is present with a grade of 3/6.  Pulmonary/Chest: Effort normal. No respiratory distress. He has no wheezes. He has rhonchi in the right middle field.   Abdominal: Soft. Bowel sounds are normal.   Musculoskeletal: Normal range of motion. He exhibits no deformity.   Neurological: He is alert. He has normal reflexes.   Skin: Skin is warm and dry. Capillary refill takes 2 to 3 seconds.   Nursing note and vitals reviewed.      Significant Labs:   ABGs: No results for input(s): PH, PCO2, HCO3,  POCSATURATED, BE, TOTALHB, COHB, METHB, O2HB, POCFIO2 in the last 48 hours.  Blood Culture: No results for input(s): LABBLOO in the last 48 hours.  BMP:   Recent Labs   Lab 03/29/19  0535   *      K 3.5      CO2 13*   BUN 61*   CREATININE 2.6*   CALCIUM 8.7     CBC:   Recent Labs   Lab 03/28/19  0502 03/29/19  0535   WBC 6.80 7.49   HGB 8.9* 8.4*   HCT 26.5* 25.1*   * 135*     CMP:   Recent Labs   Lab 03/28/19  0501 03/29/19  0535    136   K 3.9 3.5   * 110   CO2 12* 13*   * 160*   BUN 65* 61*   CREATININE 3.6* 2.6*   CALCIUM 9.0 8.7   ANIONGAP 12 13   EGFRNONAA 16* 24*     Troponin:   No results for input(s): TROPONINI in the last 48 hours.  Urine Studies:   No results for input(s): COLORU, APPEARANCEUA, PHUR, SPECGRAV, PROTEINUA, GLUCUA, KETONESU, BILIRUBINUA, OCCULTUA, NITRITE, UROBILINOGEN, LEUKOCYTESUR, RBCUA, WBCUA, BACTERIA, SQUAMEPITHEL, HYALINECASTS in the last 48 hours.    Invalid input(s): WRIGHTSUR  All pertinent labs within the past 24 hours have been reviewed.    Significant Imaging: I have reviewed all pertinent imaging results/findings within the past 24 hours.    Assessment/Plan:      * Acute encephalopathy  Related to meds and high blood pressure suspected.  Resume home meds to control blood pressure.  CT of head negative for any acute findings at this time.  UA negative.  Try to avoid narcotics or any sedated or mood altering medications.  Neuro checks.  Consider Neurology consult pending course.  Infectious disease evaluated and following up recommendations.  Successful LP on 28 March with initial analysis indicating meningitis.  Continuing empiric treatment.    Debility    Pt/ot eval, dietary consult, supportive care, social work consult.     Neck pain    CT of neck negative for any acute findings per virtual read, CT of head negative for any acute findings per virtual read.    No meningeal signs on exam.  No WBC, afebrile.  Infectious process not  suspected.    Goal for better control the BP.  Consider ultrasound bilateral carotids and are further imaging pending course.    Correct electrolyte abnormalities and acute on chronic renal failure.    Tylenol   P.r.n.   try to avoid any narcotics for altering medications, avoid NSAIDs.  Consider dose of steroid to assist with pain pending course    Trend troponins and consider further cardiac workup pending course.  Further evaluation/diagnostics/interventions/consults pending course     3/26  LP plan in progress  Consider MRI Head / Neck with sedation  CT Negative       Hypomagnesemia    Replace.  Monitor.      Acute renal failure superimposed on chronic kidney disease    Patient wife endorses decreased appetite and oral intake at home.  IV bolus given in ER.  Repeat labs this morning.  Continue gentle IV hydration    patient has not been taking his medications at home as prescribed.  Blood pressure elevated.   Nitropaste and assess response, resume home meds  For Bp control.   check chest x-ray and abdominal KUB.  Consider checking ultrasound of kidneys pending course.  Further evaluation/diagnostics/interventions/consults pending course     3/27  Improving with fluids  Monitor  Cr now 4.2        History of CVA (cerebrovascular accident)    PT/OT eval.  Social work consult.  Supportive care.  CT of head today no acute findings.  Neuro checks monitor.  Further evaluation/diagnostics/interventions/consults pending course         Essential hypertension    Nitropaste to chest wall this time.  Resume home meds.    Patient and wife encouraged to follow plan of care and medication regimens.        VTE Risk Mitigation (From admission, onward)        Ordered     heparin (porcine) injection 5,000 Units  Every 8 hours      03/27/19 0518     Place MOISES hose  Until discontinued      03/27/19 0518     Place sequential compression device  Until discontinued      03/27/19 0518              Stefan Leary MD  Department of  [facility] Medicine   [facility]

## 2019-03-30 NOTE — SUBJECTIVE & OBJECTIVE
Interval History:   03/29 -he is more awake and alert today ,cultures-pending , wife is in the room at this time.    Review of Systems   Constitutional: Negative for activity change, appetite change, chills, diaphoresis and fatigue.   HENT: Negative for congestion, dental problem, ear discharge, ear pain and facial swelling.    Eyes: Negative for pain, discharge and itching.   Respiratory: Negative for apnea, cough, chest tightness and shortness of breath.    Cardiovascular: Negative for chest pain and leg swelling.   Gastrointestinal: Negative for abdominal distention and abdominal pain.   Endocrine: Negative for cold intolerance, heat intolerance and polydipsia.   Genitourinary: Negative for difficulty urinating, dysuria and enuresis.   Musculoskeletal: Negative for arthralgias and back pain.   Skin: Negative for color change and pallor.   Allergic/Immunologic: Negative for environmental allergies and food allergies.   Neurological: Negative for dizziness, facial asymmetry, light-headedness and headaches.   Hematological: Negative for adenopathy. Does not bruise/bleed easily.   Psychiatric/Behavioral: Negative for agitation and behavioral problems.     Objective:     Vital Signs (Most Recent):  Temp: 97.7 °F (36.5 °C) (03/30/19 0801)  Pulse: 77 (03/30/19 0801)  Resp: 18 (03/30/19 0801)  BP: (!) 167/71 (03/30/19 0801)  SpO2: (!) 94 % (03/30/19 0801) Vital Signs (24h Range):  Temp:  [97.4 °F (36.3 °C)-97.8 °F (36.6 °C)] 97.7 °F (36.5 °C)  Pulse:  [66-81] 77  Resp:  [16-20] 18  SpO2:  [92 %-97 %] 94 %  BP: (131-167)/(59-73) 167/71     Weight: 87.1 kg (192 lb)  Body mass index is 25.33 kg/m².    Estimated Creatinine Clearance: 37 mL/min (A) (based on SCr of 2.1 mg/dL (H)).    Physical Exam   Constitutional: He appears well-developed and well-nourished.   HENT:   Head: Normocephalic and atraumatic.   Eyes: Pupils are equal, round, and reactive to light. EOM are normal.   Neck: Normal range of motion. No JVD present.  Muscular tenderness present. Neck rigidity present.   Cardiovascular: Regular rhythm and intact distal pulses. Tachycardia present.   Murmur heard.   Systolic murmur is present with a grade of 3/6.  Pulmonary/Chest: Effort normal. No respiratory distress. He has no wheezes. He has rhonchi in the right middle field.   Abdominal: Soft. Bowel sounds are normal.   Musculoskeletal: Normal range of motion. He exhibits no deformity.   Neurological: He is alert. He has normal reflexes.   Skin: Skin is warm and dry. Capillary refill takes 2 to 3 seconds.   Nursing note and vitals reviewed.       Significant Labs:   CBC:   Recent Labs   Lab 03/29/19  0535 03/30/19  0507   WBC 7.49 5.41   HGB 8.4* 9.0*   HCT 25.1* 28.0*   * 162     All pertinent labs within the past 24 hours have been reviewed.    Significant Imaging: I have reviewed all pertinent imaging results/findings within the past 24 hours.

## 2019-03-30 NOTE — PROGRESS NOTES
Pharmacist Intervention IV to PO Note    Kodi Ribeiro is a 69 y.o. male being treated with IV medication ; protonix    Patient Data:    Vital Signs (Most Recent):  Temp: 97.4 °F (36.3 °C) (03/30/19 0413)  Pulse: 66 (03/30/19 0537)  Resp: 18 (03/30/19 0413)  BP: (!) 165/72 (03/30/19 0413)  SpO2: (!) 92 % (03/30/19 0413)   Vital Signs (72h Range):  Temp:  [96.3 °F (35.7 °C)-101.2 °F (38.4 °C)]   Pulse:  []   Resp:  [15-22]   BP: (123-168)/(58-85)   SpO2:  [92 %-99 %]      CBC:  Recent Labs   Lab 03/28/19  0502 03/29/19  0535 03/30/19  0507   WBC 6.80 7.49 5.41   RBC 3.07* 2.89* 3.23*   HGB 8.9* 8.4* 9.0*   HCT 26.5* 25.1* 28.0*   * 135* 162   MCV 86 87 87   MCH 29.0 29.1 27.9   MCHC 33.6 33.5 32.1     CMP:     Recent Labs   Lab 03/27/19  0118 03/27/19  0547 03/28/19  0501 03/29/19  0535   * 134* 141* 160*   CALCIUM 8.3* 8.0* 9.0 8.7   ALBUMIN 2.8*  --   --   --    PROT 7.5  --   --   --    * 134* 137 136   K 4.0 4.0 3.9 3.5   CO2 10* 10* 12* 13*    111* 113* 110   BUN 62* 61* 65* 61*   CREATININE 4.8* 4.2* 3.6* 2.6*   ALKPHOS 173*  --   --   --    ALT 12  --   --   --    AST 20  --   --   --    BILITOT 0.4  --   --   --        Dietary Orders:  Diet Orders            Diet Cardiac [facility]; Pureed; Nectar Thick: Cardiac starting at 03/28 1514            Based on the following criteria, this patient qualifies for intravenous to oral conversion:  [x] The patient?s gastrointestinal tract is functioning (tolerating medications via oral or enteral route for 24 hours and tolerating food or enteral feeds for 24 hours).  [x] The patient is hemodynamically stable for 24 hours (heart rate <100 beats per minute, systolic blood pressure >99 mm Hg, and respiratory rate <20 breaths per minute).  [x] The patient shows clinical improvement (afebrile for at least 24 hours and white blood cell count downtrending or normalized). Additionally, the patient must be non-neutropenic (absolute  neutrophil count >500 cells/mm3).  [x] For antimicrobials, the patient has received IV therapy for at least 24 hours.    IV medication will be changed to oral medication : protonix 40mg daily    Pharmacist's Name: Neri Victoria  Pharmacist's Extension: [phone]

## 2019-03-30 NOTE — NURSING
Fall precautions maintained. Pt free from injuries/fall.  Repositions independently.   C/o neck pain, tylenol PRN.  Bed locked and low, side rails up x 2, phone and call light w/in reach.   POC and meds discussed, pt verbalized understanding.   Chart check done. Will cont to monitor.

## 2019-03-31 LAB
ANION GAP SERPL CALC-SCNC: 11 MMOL/L (ref 8–16)
BASOPHILS # BLD AUTO: 0 K/UL (ref 0–0.2)
BASOPHILS NFR BLD: 0 % (ref 0–1.9)
BUN SERPL-MCNC: 53 MG/DL (ref 8–23)
CALCIUM SERPL-MCNC: 8.4 MG/DL (ref 8.7–10.5)
CHLORIDE SERPL-SCNC: 108 MMOL/L (ref 95–110)
CO2 SERPL-SCNC: 17 MMOL/L (ref 23–29)
CREAT SERPL-MCNC: 1.9 MG/DL (ref 0.5–1.4)
DIFFERENTIAL METHOD: ABNORMAL
EOSINOPHIL # BLD AUTO: 0 K/UL (ref 0–0.5)
EOSINOPHIL NFR BLD: 0 % (ref 0–8)
ERYTHROCYTE [DISTWIDTH] IN BLOOD BY AUTOMATED COUNT: 14.9 % (ref 11.5–14.5)
EST. GFR  (AFRICAN AMERICAN): 40 ML/MIN/1.73 M^2
EST. GFR  (NON AFRICAN AMERICAN): 35 ML/MIN/1.73 M^2
GLUCOSE SERPL-MCNC: 174 MG/DL (ref 70–110)
HCT VFR BLD AUTO: 26.7 % (ref 40–54)
HGB BLD-MCNC: 8.8 G/DL (ref 14–18)
LYMPHOCYTES # BLD AUTO: 0.7 K/UL (ref 1–4.8)
LYMPHOCYTES NFR BLD: 13.2 % (ref 18–48)
MCH RBC QN AUTO: 28.4 PG (ref 27–31)
MCHC RBC AUTO-ENTMCNC: 33 G/DL (ref 32–36)
MCV RBC AUTO: 86 FL (ref 82–98)
MONOCYTES # BLD AUTO: 0.3 K/UL (ref 0.3–1)
MONOCYTES NFR BLD: 6.6 % (ref 4–15)
NEUTROPHILS # BLD AUTO: 4 K/UL (ref 1.8–7.7)
NEUTROPHILS NFR BLD: 80.2 % (ref 38–73)
PLATELET # BLD AUTO: 150 K/UL (ref 150–350)
PMV BLD AUTO: 9.7 FL (ref 9.2–12.9)
POTASSIUM SERPL-SCNC: 3.7 MMOL/L (ref 3.5–5.1)
RBC # BLD AUTO: 3.1 M/UL (ref 4.6–6.2)
SODIUM SERPL-SCNC: 136 MMOL/L (ref 136–145)
SPECIMEN SOURCE: NORMAL
VARICELLA ZOSTER BY PCR RESULT: NEGATIVE
WBC # BLD AUTO: 5 K/UL (ref 3.9–12.7)

## 2019-03-31 PROCEDURE — 99900035 HC TECH TIME PER 15 MIN (STAT): Mod: HCNC

## 2019-03-31 PROCEDURE — 94640 AIRWAY INHALATION TREATMENT: CPT | Mod: HCNC

## 2019-03-31 PROCEDURE — 36415 COLL VENOUS BLD VENIPUNCTURE: CPT | Mod: HCNC

## 2019-03-31 PROCEDURE — 25000003 PHARM REV CODE 250: Mod: HCNC | Performed by: NURSE PRACTITIONER

## 2019-03-31 PROCEDURE — 25000003 PHARM REV CODE 250: Mod: HCNC | Performed by: INTERNAL MEDICINE

## 2019-03-31 PROCEDURE — 99233 PR SUBSEQUENT HOSPITAL CARE,LEVL III: ICD-10-PCS | Mod: HCNC,,, | Performed by: INTERNAL MEDICINE

## 2019-03-31 PROCEDURE — 63600175 PHARM REV CODE 636 W HCPCS: Mod: HCNC | Performed by: NURSE PRACTITIONER

## 2019-03-31 PROCEDURE — 80048 BASIC METABOLIC PNL TOTAL CA: CPT | Mod: HCNC

## 2019-03-31 PROCEDURE — 63600175 PHARM REV CODE 636 W HCPCS: Mod: HCNC | Performed by: INTERNAL MEDICINE

## 2019-03-31 PROCEDURE — 21400001 HC TELEMETRY ROOM: Mod: HCNC

## 2019-03-31 PROCEDURE — 25000003 PHARM REV CODE 250: Mod: HCNC | Performed by: HOSPITALIST

## 2019-03-31 PROCEDURE — 85025 COMPLETE CBC W/AUTO DIFF WBC: CPT | Mod: HCNC

## 2019-03-31 PROCEDURE — 25000242 PHARM REV CODE 250 ALT 637 W/ HCPCS: Mod: HCNC | Performed by: NURSE PRACTITIONER

## 2019-03-31 PROCEDURE — 99233 SBSQ HOSP IP/OBS HIGH 50: CPT | Mod: HCNC,,, | Performed by: INTERNAL MEDICINE

## 2019-03-31 PROCEDURE — 97110 THERAPEUTIC EXERCISES: CPT | Mod: HCNC

## 2019-03-31 RX ORDER — HYDRALAZINE HYDROCHLORIDE 10 MG/1
10 TABLET, FILM COATED ORAL EVERY 8 HOURS
Status: DISCONTINUED | OUTPATIENT
Start: 2019-03-31 | End: 2019-04-04

## 2019-03-31 RX ADMIN — IPRATROPIUM BROMIDE AND ALBUTEROL SULFATE 3 ML: .5; 3 SOLUTION RESPIRATORY (INHALATION) at 07:03

## 2019-03-31 RX ADMIN — ACYCLOVIR SODIUM 870 MG: 1000 INJECTION, SOLUTION INTRAVENOUS at 08:03

## 2019-03-31 RX ADMIN — CEFTRIAXONE 2 G: 2 INJECTION, SOLUTION INTRAVENOUS at 05:03

## 2019-03-31 RX ADMIN — CARVEDILOL 25 MG: 12.5 TABLET, FILM COATED ORAL at 05:03

## 2019-03-31 RX ADMIN — ISOSORBIDE MONONITRATE 60 MG: 60 TABLET, EXTENDED RELEASE ORAL at 08:03

## 2019-03-31 RX ADMIN — GUAIFENESIN 600 MG: 600 TABLET, EXTENDED RELEASE ORAL at 08:03

## 2019-03-31 RX ADMIN — CARVEDILOL 25 MG: 12.5 TABLET, FILM COATED ORAL at 08:03

## 2019-03-31 RX ADMIN — TRAMADOL HYDROCHLORIDE 50 MG: 50 TABLET, COATED ORAL at 09:03

## 2019-03-31 RX ADMIN — RAMELTEON 8 MG: 8 TABLET, FILM COATED ORAL at 09:03

## 2019-03-31 RX ADMIN — HEPARIN SODIUM 5000 UNITS: 5000 INJECTION, SOLUTION INTRAVENOUS; SUBCUTANEOUS at 09:03

## 2019-03-31 RX ADMIN — GUAIFENESIN 600 MG: 600 TABLET, EXTENDED RELEASE ORAL at 09:03

## 2019-03-31 RX ADMIN — HEPARIN SODIUM 5000 UNITS: 5000 INJECTION, SOLUTION INTRAVENOUS; SUBCUTANEOUS at 05:03

## 2019-03-31 RX ADMIN — AMLODIPINE BESYLATE 10 MG: 10 TABLET ORAL at 08:03

## 2019-03-31 RX ADMIN — TRAMADOL HYDROCHLORIDE 50 MG: 50 TABLET, COATED ORAL at 12:03

## 2019-03-31 RX ADMIN — IPRATROPIUM BROMIDE AND ALBUTEROL SULFATE 3 ML: .5; 3 SOLUTION RESPIRATORY (INHALATION) at 03:03

## 2019-03-31 RX ADMIN — HYDRALAZINE HYDROCHLORIDE 10 MG: 10 TABLET, FILM COATED ORAL at 03:03

## 2019-03-31 RX ADMIN — HYDRALAZINE HYDROCHLORIDE 10 MG: 10 TABLET, FILM COATED ORAL at 09:03

## 2019-03-31 RX ADMIN — HEPARIN SODIUM 5000 UNITS: 5000 INJECTION, SOLUTION INTRAVENOUS; SUBCUTANEOUS at 02:03

## 2019-03-31 RX ADMIN — ACETAMINOPHEN 650 MG: 325 TABLET ORAL at 05:03

## 2019-03-31 RX ADMIN — SODIUM BICARBONATE 650 MG TABLET 650 MG: at 09:03

## 2019-03-31 RX ADMIN — SODIUM BICARBONATE 650 MG TABLET 650 MG: at 03:03

## 2019-03-31 RX ADMIN — SODIUM BICARBONATE 650 MG TABLET 650 MG: at 08:03

## 2019-03-31 RX ADMIN — DOCUSATE SODIUM 100 MG: 100 CAPSULE, LIQUID FILLED ORAL at 08:03

## 2019-03-31 RX ADMIN — PANTOPRAZOLE SODIUM 40 MG: 40 TABLET, DELAYED RELEASE ORAL at 08:03

## 2019-03-31 NOTE — PLAN OF CARE
Problem: Adult Inpatient Plan of Care  Goal: Plan of Care Review  Outcome: Ongoing (interventions implemented as appropriate)  Patient remained free from injury. Spouse at bedside. Bedside commode in use. PRN pain meds managed pain. No s/s of distress. Cardiac monitored NSR. 12hr chart check complete.

## 2019-03-31 NOTE — ASSESSMENT & PLAN NOTE
1. MARCELA on CKD stage 4 :  Baseline serum creatinine about 2 mg/dL, acute kidney injury multifactorial, patient was taking NSAIDs prior to admission to the hospital, also volume depleted, renal function back to baseline at about 2 mg/dL ,     2.  Hypertension - blood pressure still slightly elevated, on IV steroids, can cause some hypertension, add low dose hydralazine , this can be discontinued at discharge if BP improved, steroids tapered ,     3.  Altered mental status - ID notes reviewed, s/p lumbar puncture, CSF analysis pending, on broad-spectrum antibiotics,    4.  Anemia of chronic kidney disease - monitor hemoglobin, PRBC transfusion when indicated    5.  Stable lytes    6.  Metabolic acidosis - cont  sodium bicarbonate supplements.

## 2019-03-31 NOTE — SUBJECTIVE & OBJECTIVE
Interval History:  Renal function improving steadily.  Intermittent upper back and neck pain.  No fevers.    Review of Systems   Constitutional: Positive for activity change. Negative for chills, fatigue and fever.   HENT: Negative.  Negative for congestion and sore throat.    Eyes: Negative.  Negative for visual disturbance.   Respiratory: Negative.  Negative for cough, shortness of breath and wheezing.    Cardiovascular: Negative.  Negative for chest pain.   Gastrointestinal: Negative for abdominal pain, diarrhea, nausea and vomiting.   Endocrine: Negative.    Genitourinary: Negative.    Musculoskeletal: Positive for neck pain. Negative for myalgias and neck stiffness.   Skin: Negative.  Negative for color change and pallor.   Allergic/Immunologic: Negative.    Neurological: Positive for weakness.   Hematological: Negative.    Psychiatric/Behavioral: Negative.  Negative for agitation and confusion.   All other systems reviewed and are negative.    Objective:     Vital Signs (Most Recent):  Temp: 97.6 °F (36.4 °C) (03/30/19 2040)  Pulse: 68 (03/30/19 2040)  Resp: 18 (03/30/19 2040)  BP: (!) 154/71 (03/30/19 2040)  SpO2: (!) 94 % (03/30/19 2040) Vital Signs (24h Range):  Temp:  [97.4 °F (36.3 °C)-97.7 °F (36.5 °C)] 97.6 °F (36.4 °C)  Pulse:  [66-78] 68  Resp:  [16-18] 18  SpO2:  [92 %-98 %] 94 %  BP: (135-167)/(62-73) 154/71     Weight: 87.1 kg (192 lb)  Body mass index is 25.33 kg/m².    Intake/Output Summary (Last 24 hours) at 3/30/2019 2052  Last data filed at 3/30/2019 1111  Gross per 24 hour   Intake 1580 ml   Output --   Net 1580 ml      Physical Exam   Constitutional: He appears well-developed and well-nourished.   HENT:   Head: Normocephalic and atraumatic.   Eyes: Pupils are equal, round, and reactive to light. EOM are normal.   Neck: Normal range of motion. No JVD present. Muscular tenderness present. Neck rigidity present.   Cardiovascular: Regular rhythm and intact distal pulses. Tachycardia present.    Murmur heard.   Systolic murmur is present with a grade of 3/6.  Pulmonary/Chest: Effort normal. No respiratory distress. He has no wheezes. He has rhonchi in the right middle field.   Abdominal: Soft. Bowel sounds are normal.   Musculoskeletal: Normal range of motion. He exhibits no deformity.   Neurological: He is alert. He has normal reflexes.   Skin: Skin is warm and dry. Capillary refill takes 2 to 3 seconds.   Nursing note and vitals reviewed.      Significant Labs:   ABGs: No results for input(s): PH, PCO2, HCO3, POCSATURATED, BE, TOTALHB, COHB, METHB, O2HB, POCFIO2 in the last 48 hours.  Blood Culture:   Recent Labs   Lab 03/29/19  1058 03/29/19  1101   LABBLOO No Growth to date No Growth to date     BMP:   Recent Labs   Lab 03/30/19  0507   *      K 3.9      CO2 15*   BUN 54*   CREATININE 2.1*   CALCIUM 8.5*     CBC:   Recent Labs   Lab 03/29/19  0535 03/30/19  0507   WBC 7.49 5.41   HGB 8.4* 9.0*   HCT 25.1* 28.0*   * 162     CMP:   Recent Labs   Lab 03/29/19  0535 03/30/19  0507    137   K 3.5 3.9    109   CO2 13* 15*   * 173*   BUN 61* 54*   CREATININE 2.6* 2.1*   CALCIUM 8.7 8.5*   ANIONGAP 13 13   EGFRNONAA 24* 31*     Troponin:   No results for input(s): TROPONINI in the last 48 hours.  Urine Studies:   No results for input(s): COLORU, APPEARANCEUA, PHUR, SPECGRAV, PROTEINUA, GLUCUA, KETONESU, BILIRUBINUA, OCCULTUA, NITRITE, UROBILINOGEN, LEUKOCYTESUR, RBCUA, WBCUA, BACTERIA, SQUAMEPITHEL, HYALINECASTS in the last 48 hours.    Invalid input(s): WRIGHTSUR  All pertinent labs within the past 24 hours have been reviewed.    Significant Imaging: I have reviewed all pertinent imaging results/findings within the past 24 hours.

## 2019-03-31 NOTE — SUBJECTIVE & OBJECTIVE
Interval History: pt was seen and examined. No new issues, stable last night. No new c/o's, no SOB. Discussed the medical issues with pt's wife., dtr.     3/31/19- no acute events     Review of patient's allergies indicates:   Allergen Reactions    Morphine Itching     Current Facility-Administered Medications   Medication Frequency    acetaminophen tablet 650 mg Q8H PRN    acyclovir (ZOVIRAX) 870 mg in dextrose 5 % 250 mL IVPB Daily    albuterol-ipratropium 2.5 mg-0.5 mg/3 mL nebulizer solution 3 mL Q8H    amLODIPine tablet 10 mg Daily    carvedilol tablet 25 mg BID WM    cefTRIAXone (ROCEPHIN) 2 g in dextrose 5 % 50 mL IVPB Q12H    docusate sodium capsule 100 mg BID    guaiFENesin 12 hr tablet 600 mg BID    heparin (porcine) injection 5,000 Units Q8H    isosorbide mononitrate 24 hr tablet 60 mg Daily    pantoprazole EC tablet 40 mg Daily    ramelteon tablet 8 mg Nightly PRN    sodium bicarbonate tablet 650 mg TID    traMADol tablet 50 mg Q6H PRN       Objective:     Vital Signs (Most Recent):  Temp: 97.4 °F (36.3 °C) (03/31/19 1238)  Pulse: 65 (03/31/19 1238)  Resp: 18 (03/31/19 1238)  BP: (!) 159/69 (03/31/19 1238)  SpO2: 97 % (03/31/19 1238)  O2 Device (Oxygen Therapy): room air (03/31/19 1238) Vital Signs (24h Range):  Temp:  [97.4 °F (36.3 °C)-98.1 °F (36.7 °C)] 97.4 °F (36.3 °C)  Pulse:  [62-77] 65  Resp:  [16-20] 18  SpO2:  [93 %-99 %] 97 %  BP: (144-160)/(64-74) 159/69     Weight: 87.1 kg (192 lb) (03/27/19 0235)  Body mass index is 25.33 kg/m².  Body surface area is 2.12 meters squared.    I/O last 3 completed shifts:  In: 1680 [P.O.:180; IV Piggyback:1500]  Out: -     Physical Exam   Constitutional: He is oriented to person, place, and time. He appears well-developed and well-nourished. No distress.   HENT:   Head: Normocephalic and atraumatic.   Neck: No JVD present.   Cardiovascular: Normal rate, regular rhythm and normal heart sounds. Exam reveals no friction rub.   Pulmonary/Chest:  Effort normal and breath sounds normal. He has no rales.   Abdominal: Soft. Bowel sounds are normal. He exhibits no distension. There is no tenderness. There is no guarding.   Musculoskeletal: He exhibits no edema.   Neurological: He is alert and oriented to person, place, and time.   Skin: Skin is warm and dry.   Psychiatric: He has a normal mood and affect. His behavior is normal.   Nursing note and vitals reviewed.      Significant Labs: reviewed  BMP  Lab Results   Component Value Date     03/31/2019    K 3.7 03/31/2019     03/31/2019    CO2 17 (L) 03/31/2019    BUN 53 (H) 03/31/2019    CREATININE 1.9 (H) 03/31/2019    CALCIUM 8.4 (L) 03/31/2019    ANIONGAP 11 03/31/2019    ESTGFRAFRICA 40 (A) 03/31/2019    EGFRNONAA 35 (A) 03/31/2019     Lab Results   Component Value Date    WBC 5.00 03/31/2019    HGB 8.8 (L) 03/31/2019    HCT 26.7 (L) 03/31/2019    MCV 86 03/31/2019     03/31/2019     Lab Results   Component Value Date    .0 (H) 03/20/2019    CALCIUM 8.4 (L) 03/31/2019    PHOS 3.0 03/27/2019       Lab Results   Component Value Date    ALBUMIN 2.8 (L) 03/27/2019

## 2019-03-31 NOTE — PROGRESS NOTES
[facility]  Nephrology  Progress Note    Patient Name: Kodi Ribeiro  MRN: [id number]  Admission Date: 3/27/2019  Hospital Length of Stay: 4 days  Attending Provider: Stefan Leary MD   Primary Care Physician: Norma Nuñez MD  Principal Problem:Acute encephalopathy    Subjective:     HPI: Pt was seen and examined. H/o and chart reviewed. Pt is a 70 y/o male with ah/o of HTN and CKD stage 4, who presented with neck pain. W/u was reviewed. CT neck and head unremarkable. Pt did not taje his BP meds while experiencing neck pain. BP elevated. s Cr has worsened form baseline to 4.8, repeat 4.2. Pt's wife says that he takes ibuprofen. According to her he has taken 1-3 tablets 2-3 times a day. She actually says that on some days he has taken 9 per day. Pt has no other c/o's, no sx's of dysuria, no GI losses.    Interval History: pt was seen and examined. No new issues, stable last night. No new c/o's, no SOB. Discussed the medical issues with pt's wife., dtr.     3/31/19- no acute events     Review of patient's allergies indicates:   Allergen Reactions    Morphine Itching     Current Facility-Administered Medications   Medication Frequency    acetaminophen tablet 650 mg Q8H PRN    acyclovir (ZOVIRAX) 870 mg in dextrose 5 % 250 mL IVPB Daily    albuterol-ipratropium 2.5 mg-0.5 mg/3 mL nebulizer solution 3 mL Q8H    amLODIPine tablet 10 mg Daily    carvedilol tablet 25 mg BID WM    cefTRIAXone (ROCEPHIN) 2 g in dextrose 5 % 50 mL IVPB Q12H    docusate sodium capsule 100 mg BID    guaiFENesin 12 hr tablet 600 mg BID    heparin (porcine) injection 5,000 Units Q8H    isosorbide mononitrate 24 hr tablet 60 mg Daily    pantoprazole EC tablet 40 mg Daily    ramelteon tablet 8 mg Nightly PRN    sodium bicarbonate tablet 650 mg TID    traMADol tablet 50 mg Q6H PRN       Objective:     Vital Signs (Most Recent):  Temp: 97.4 °F (36.3 °C) (03/31/19 1238)  Pulse: 65 (03/31/19 1238)  Resp: 18  (03/31/19 1238)  BP: (!) 159/69 (03/31/19 1238)  SpO2: 97 % (03/31/19 1238)  O2 Device (Oxygen Therapy): room air (03/31/19 1238) Vital Signs (24h Range):  Temp:  [97.4 °F (36.3 °C)-98.1 °F (36.7 °C)] 97.4 °F (36.3 °C)  Pulse:  [62-77] 65  Resp:  [16-20] 18  SpO2:  [93 %-99 %] 97 %  BP: (144-160)/(64-74) 159/69     Weight: 87.1 kg (192 lb) (03/27/19 0235)  Body mass index is 25.33 kg/m².  Body surface area is 2.12 meters squared.    I/O last 3 completed shifts:  In: 1680 [P.O.:180; IV Piggyback:1500]  Out: -     Physical Exam   Constitutional: He is oriented to person, place, and time. He appears well-developed and well-nourished. No distress.   HENT:   Head: Normocephalic and atraumatic.   Neck: No JVD present.   Cardiovascular: Normal rate, regular rhythm and normal heart sounds. Exam reveals no friction rub.   Pulmonary/Chest: Effort normal and breath sounds normal. He has no rales.   Abdominal: Soft. Bowel sounds are normal. He exhibits no distension. There is no tenderness. There is no guarding.   Musculoskeletal: He exhibits no edema.   Neurological: He is alert and oriented to person, place, and time.   Skin: Skin is warm and dry.   Psychiatric: He has a normal mood and affect. His behavior is normal.   Nursing note and vitals reviewed.      Significant Labs: reviewed  BMP  Lab Results   Component Value Date     03/31/2019    K 3.7 03/31/2019     03/31/2019    CO2 17 (L) 03/31/2019    BUN 53 (H) 03/31/2019    CREATININE 1.9 (H) 03/31/2019    CALCIUM 8.4 (L) 03/31/2019    ANIONGAP 11 03/31/2019    ESTGFRAFRICA 40 (A) 03/31/2019    EGFRNONAA 35 (A) 03/31/2019     Lab Results   Component Value Date    WBC 5.00 03/31/2019    HGB 8.8 (L) 03/31/2019    HCT 26.7 (L) 03/31/2019    MCV 86 03/31/2019     03/31/2019     Lab Results   Component Value Date    .0 (H) 03/20/2019    CALCIUM 8.4 (L) 03/31/2019    PHOS 3.0 03/27/2019       Lab Results   Component Value Date    ALBUMIN 2.8 (L)  03/27/2019           Assessment/Plan:     Acute renal failure superimposed on chronic kidney disease  1. MARCELA on CKD stage 4 :  Baseline serum creatinine about 2 mg/dL, acute kidney injury multifactorial, patient was taking NSAIDs prior to admission to the hospital, also volume depleted, renal function back to baseline at about 2 mg/dL ,     2.  Hypertension - blood pressure still slightly elevated, on IV steroids, can cause some hypertension, add low dose hydralazine , this can be discontinued at discharge if BP improved, steroids tapered ,     3.  Altered mental status - ID notes reviewed, s/p lumbar puncture, CSF analysis pending, on broad-spectrum antibiotics,    4.  Anemia of chronic kidney disease - monitor hemoglobin, PRBC transfusion when indicated    5.  Stable lytes    6.  Metabolic acidosis - cont  sodium bicarbonate supplements.                Thank you for your consult. I will follow-up with patient. Please contact us if you have any additional questions.     Total time spent 40 minutes including time needed to review the records,  patient  evaluation, documentation, face-to-face discussion with the patient,  dtr, primary team,   more than 50% of the time was spent on coordination of care and counseling.       Nilay Zambrano MD  Nephrology  [facility]

## 2019-03-31 NOTE — PROGRESS NOTES
[facility] Hospital Medicine  Progress Note    Patient Name: Kodi Ribeiro  MRN: [id number]  Patient Class: IP- Inpatient   Admission Date: 3/27/2019  Length of Stay: 3 days  Attending Physician: Stefan Leary MD  Primary Care Provider: Norma Nuñez MD        Subjective:     Principal Problem:Acute encephalopathy    HPI:   69-year-old male patient with extensive medical history presents today with complaint of neck pain that started over the weekend.  Patient reports he was seen in the emergency room a couple days ago was given pain medication and muscle relaxer and discharged home.  Patient reports that treatment with outpatient medications not effective and continues to complain of this pain.  Range of motion of neck worsens.  Associated symptoms include some confusion and weakness endorsed by patient's wife at bedside.  Additionally patient's wife reports patient has not been taking his regular medicines over the last couple of days because she was worried only about his pain and treating that and forgot about his other medicines.  Patient was evaluated in the emergency room.   Significant labs creatinine of 4.8 and BUN of 4.2.  Per emergency room note  No acute findings on CT of cervical spine and CT head.   Hospital Medicine consult.  Patient placed in observation.    Hospital Course:  Patient admitted to hospital with CC Neck Pain. Patient placed on BS abx. Concern is for meningitis as patient continues with AMS, febrile episodes and c/o Neck Pain 10/10. Family at bedside. ID consulted. Patient covered broadly. Patient unable to obtain LP per IR due to his last receiving plavix on Sunday.  Dr Curtis has agreed to do LP Thursday. CT head negative and MRI unable to obtain due to patient uncooperativity / claustrophobia per family. Patient was responding to query correctly at time of exam. He was able to tell me where he is, the year, and the president. Family reports this is an  improvement from earlier today. POC discussed in detail.  28 March successful lumbar puncture.  Renal function improving steadily.  Intermittent upper back and neck pain.  No fevers.    Interval History:  Renal function improving steadily.  Intermittent upper back and neck pain.  No fevers.    Review of Systems   Constitutional: Positive for activity change. Negative for chills, fatigue and fever.   HENT: Negative.  Negative for congestion and sore throat.    Eyes: Negative.  Negative for visual disturbance.   Respiratory: Negative.  Negative for cough, shortness of breath and wheezing.    Cardiovascular: Negative.  Negative for chest pain.   Gastrointestinal: Negative for abdominal pain, diarrhea, nausea and vomiting.   Endocrine: Negative.    Genitourinary: Negative.    Musculoskeletal: Positive for neck pain. Negative for myalgias and neck stiffness.   Skin: Negative.  Negative for color change and pallor.   Allergic/Immunologic: Negative.    Neurological: Positive for weakness.   Hematological: Negative.    Psychiatric/Behavioral: Negative.  Negative for agitation and confusion.   All other systems reviewed and are negative.    Objective:     Vital Signs (Most Recent):  Temp: 97.6 °F (36.4 °C) (03/30/19 2040)  Pulse: 68 (03/30/19 2040)  Resp: 18 (03/30/19 2040)  BP: (!) 154/71 (03/30/19 2040)  SpO2: (!) 94 % (03/30/19 2040) Vital Signs (24h Range):  Temp:  [97.4 °F (36.3 °C)-97.7 °F (36.5 °C)] 97.6 °F (36.4 °C)  Pulse:  [66-78] 68  Resp:  [16-18] 18  SpO2:  [92 %-98 %] 94 %  BP: (135-167)/(62-73) 154/71     Weight: 87.1 kg (192 lb)  Body mass index is 25.33 kg/m².    Intake/Output Summary (Last 24 hours) at 3/30/2019 2052  Last data filed at 3/30/2019 1111  Gross per 24 hour   Intake 1580 ml   Output --   Net 1580 ml      Physical Exam   Constitutional: He appears well-developed and well-nourished.   HENT:   Head: Normocephalic and atraumatic.   Eyes: Pupils are equal, round, and reactive to light. EOM are  normal.   Neck: Normal range of motion. No JVD present. Muscular tenderness present. Neck rigidity present.   Cardiovascular: Regular rhythm and intact distal pulses. Tachycardia present.   Murmur heard.   Systolic murmur is present with a grade of 3/6.  Pulmonary/Chest: Effort normal. No respiratory distress. He has no wheezes. He has rhonchi in the right middle field.   Abdominal: Soft. Bowel sounds are normal.   Musculoskeletal: Normal range of motion. He exhibits no deformity.   Neurological: He is alert. He has normal reflexes.   Skin: Skin is warm and dry. Capillary refill takes 2 to 3 seconds.   Nursing note and vitals reviewed.      Significant Labs:   ABGs: No results for input(s): PH, PCO2, HCO3, POCSATURATED, BE, TOTALHB, COHB, METHB, O2HB, POCFIO2 in the last 48 hours.  Blood Culture:   Recent Labs   Lab 03/29/19  1058 03/29/19  1101   LABBLOO No Growth to date No Growth to date     BMP:   Recent Labs   Lab 03/30/19  0507   *      K 3.9      CO2 15*   BUN 54*   CREATININE 2.1*   CALCIUM 8.5*     CBC:   Recent Labs   Lab 03/29/19  0535 03/30/19  0507   WBC 7.49 5.41   HGB 8.4* 9.0*   HCT 25.1* 28.0*   * 162     CMP:   Recent Labs   Lab 03/29/19  0535 03/30/19  0507    137   K 3.5 3.9    109   CO2 13* 15*   * 173*   BUN 61* 54*   CREATININE 2.6* 2.1*   CALCIUM 8.7 8.5*   ANIONGAP 13 13   EGFRNONAA 24* 31*     Troponin:   No results for input(s): TROPONINI in the last 48 hours.  Urine Studies:   No results for input(s): COLORU, APPEARANCEUA, PHUR, SPECGRAV, PROTEINUA, GLUCUA, KETONESU, BILIRUBINUA, OCCULTUA, NITRITE, UROBILINOGEN, LEUKOCYTESUR, RBCUA, WBCUA, BACTERIA, SQUAMEPITHEL, HYALINECASTS in the last 48 hours.    Invalid input(s): WRIGHTSUR  All pertinent labs within the past 24 hours have been reviewed.    Significant Imaging: I have reviewed all pertinent imaging results/findings within the past 24 hours.    Assessment/Plan:      * Acute  encephalopathy  Related to meds and high blood pressure suspected.  Resume home meds to control blood pressure.  CT of head negative for any acute findings at this time.  UA negative.  Try to avoid narcotics or any sedated or mood altering medications.  Neuro checks.  Consider Neurology consult pending course.  Infectious disease evaluated and following up recommendations.  Successful LP on 28 March with initial analysis indicating meningitis.  Continuing empiric treatment.    Debility    Pt/ot eval, dietary consult, supportive care, social work consult.     Neck pain    CT of neck negative for any acute findings per virtual read, CT of head negative for any acute findings per virtual read.    No meningeal signs on exam.  No WBC, afebrile.  Infectious process not suspected.    Goal for better control the BP.  Consider ultrasound bilateral carotids and are further imaging pending course.    Correct electrolyte abnormalities and acute on chronic renal failure.    Tylenol   P.r.n.   try to avoid any narcotics for altering medications, avoid NSAIDs.  Consider dose of steroid to assist with pain pending course    Trend troponins and consider further cardiac workup pending course.  Further evaluation/diagnostics/interventions/consults pending course     3/26  LP plan in progress  Consider MRI Head / Neck with sedation  CT Negative       Hypomagnesemia    Replace.  Monitor.      Acute renal failure superimposed on chronic kidney disease    Patient wife endorses decreased appetite and oral intake at home.  IV bolus given in ER.  Repeat labs this morning.  Continue gentle IV hydration    patient has not been taking his medications at home as prescribed.  Blood pressure elevated.   Nitropaste and assess response, resume home meds  For Bp control.   check chest x-ray and abdominal KUB.  Consider checking ultrasound of kidneys pending course.  Further evaluation/diagnostics/interventions/consults pending course      3/27  Improving with fluids  Monitor  Cr now 4.2        History of CVA (cerebrovascular accident)    PT/OT eval.  Social work consult.  Supportive care.  CT of head today no acute findings.  Neuro checks monitor.  Further evaluation/diagnostics/interventions/consults pending course         Essential hypertension    Nitropaste to chest wall this time.  Resume home meds.    Patient and wife encouraged to follow plan of care and medication regimens.        VTE Risk Mitigation (From admission, onward)        Ordered     heparin (porcine) injection 5,000 Units  Every 8 hours      03/27/19 0518     Place MOISES hose  Until discontinued      03/27/19 0518     Place sequential compression device  Until discontinued      03/27/19 0518              Stefan Leary MD  Department of Hospital Medicine   [facility]

## 2019-03-31 NOTE — PT/OT/SLP PROGRESS
Physical Therapy  Treatment    Kodi Ribeiro   MRN: [id number]   Admitting Diagnosis: Acute encephalopathy       PT Start Time: 1035     PT Stop Time: 1050    PT Total Time (min): 15 min       Billable Minutes:  Therapeutic Exercise 15    Treatment Type: Treatment  PT/PTA: PTA             General Precautions: Standard, fall  Orthopedic Precautions: N/A   Braces:           Subjective:  Communicated with NSG prior to session.      Pain/Comfort  Pain Rating 1: 0/10  Pain Rating Post-Intervention 1: 0/10    Objective:        Functional Mobility:  Bed Mobility:        Transfers:       Gait:        Stairs:          Balance:   Static Sit: GOOD+: Takes MAXIMAL challenges from all directions.    Dynamic Sit: NORMAL: No deviations seen in posture held dynamically  Static Stand: 0: Needs MAXIMAL assist to maintain   Dynamic stand: 0: N/A     Therapeutic Activities and Exercises:  LAQ, HIP FLEX, AND l AP EACH 2X30  SCOOT L/R SITTING ON EOB     AM-PAC 6 CLICK MOBILITY  How much help from another person does this patient currently need?   1 = Unable, Total/Dependent Assistance  2 = A lot, Maximum/Moderate Assistance  3 = A little, Minimum/Contact Guard/Supervision  4 = None, Modified [place]/Independent         AM-PAC Raw Score CMS G-Code Modifier Level of Impairment Assistance   6 % Total / Unable   7 - 9 CM 80 - 100% Maximal Assist   10 - 14 CL 60 - 80% Moderate Assist   15 - 19 CK 40 - 60% Moderate Assist   20 - 22 CJ 20 - 40% Minimal Assist   23 CI 1-20% SBA / CGA   24 CH 0% Independent/ Mod I     Patient left SITTING ON BED with all lines intact, call button in reach and FMAILY present.    Assessment:  Kodi Ribeiro is a 69 y.o. male with a medical diagnosis of Acute encephalopathy and presents with .    Rehab identified problem list/impairments: Rehab identified problem list/impairments: weakness, impaired self care skills, impaired functional mobilty, decreased lower extremity function    Rehab potential  is good.    Activity tolerance: Good    Discharge recommendations: Discharge Facility/Level of Care Needs: nursing facility, skilled     Barriers to discharge:      Equipment recommendations: Equipment Needed After Discharge: walker, rolling, transfer board, wheelchair, manual     GOALS:   Multidisciplinary Problems     Physical Therapy Goals        Problem: Physical Therapy Goal    Goal Priority Disciplines Outcome Goal Variances Interventions   Physical Therapy Goal     PT, PT/OT Ongoing (interventions implemented as appropriate)     Description:  LTG'S TO BE MET IN 7 DAYS (4-3-19)  1. PT WILL [facility] FOR BED MOBILITY  2. PT WILL REQUIRE [facility] FOR TF'S  3. PT WILL TOLERATED BLE THEREX X 20 REPS                      PLAN:    Patient to be seen 5 x/week  to address the above listed problems via gait training, therapeutic exercises  Plan of Care expires: 04/03/19  Plan of Care reviewed with: patient         Brennen Mohsen, PTA  03/31/2019

## 2019-03-31 NOTE — PLAN OF CARE
Problem: Physical Therapy Goal  Goal: Physical Therapy Goal  LTG'S TO BE MET IN 7 DAYS (4-3-19)  1. PT WILL [facility] FOR BED MOBILITY  2. PT WILL REQUIRE [facility] FOR TF'S  3. PT WILL TOLERATED BLE THEREX X 20 REPS     Outcome: Ongoing (interventions implemented as appropriate)  GOOD SITTING BALANCE WITH ALL EXERCISES BEING PERFORMED WITH NO BACK SUPPORT ON EDGE OF BED.

## 2019-03-31 NOTE — PLAN OF CARE
Problem: Adult Inpatient Plan of Care  Goal: Plan of Care Review  Outcome: Ongoing (interventions implemented as appropriate)  No acute events occurred throughout shift.  Pt's mental status continues to improve.  Pt's continues to have neck pain which Tramadol ordered.  VSS throughout shift.  Plan of care discussed with pt and pt's family with all questions and concerns answered.

## 2019-03-31 NOTE — PLAN OF CARE
Problem: Adult Inpatient Plan of Care  Goal: Plan of Care Review  Outcome: Ongoing (interventions implemented as appropriate)  No acute events occurred throughout shift.  Pt's  AAOx4 and mental status stayed the same throughout shift.  Plan of care discussed with pt, pt's spouse, and pt's daughter, with all questions and concerns answered.

## 2019-04-01 LAB
ANION GAP SERPL CALC-SCNC: 9 MMOL/L (ref 8–16)
BASOPHILS # BLD AUTO: 0 K/UL (ref 0–0.2)
BASOPHILS NFR BLD: 0 % (ref 0–1.9)
BUN SERPL-MCNC: 48 MG/DL (ref 8–23)
CALCIUM SERPL-MCNC: 8.1 MG/DL (ref 8.7–10.5)
CHLORIDE SERPL-SCNC: 108 MMOL/L (ref 95–110)
CO2 SERPL-SCNC: 19 MMOL/L (ref 23–29)
CREAT SERPL-MCNC: 1.7 MG/DL (ref 0.5–1.4)
DIFFERENTIAL METHOD: ABNORMAL
EOSINOPHIL # BLD AUTO: 0.1 K/UL (ref 0–0.5)
EOSINOPHIL NFR BLD: 0.7 % (ref 0–8)
ERYTHROCYTE [DISTWIDTH] IN BLOOD BY AUTOMATED COUNT: 14.8 % (ref 11.5–14.5)
EST. GFR  (AFRICAN AMERICAN): 46 ML/MIN/1.73 M^2
EST. GFR  (NON AFRICAN AMERICAN): 40 ML/MIN/1.73 M^2
GLUCOSE SERPL-MCNC: 132 MG/DL (ref 70–110)
HCT VFR BLD AUTO: 27.2 % (ref 40–54)
HGB BLD-MCNC: 8.7 G/DL (ref 14–18)
LYMPHOCYTES # BLD AUTO: 1.1 K/UL (ref 1–4.8)
LYMPHOCYTES NFR BLD: 16.4 % (ref 18–48)
MCH RBC QN AUTO: 27.5 PG (ref 27–31)
MCHC RBC AUTO-ENTMCNC: 32 G/DL (ref 32–36)
MCV RBC AUTO: 86 FL (ref 82–98)
MONOCYTES # BLD AUTO: 0.5 K/UL (ref 0.3–1)
MONOCYTES NFR BLD: 7.8 % (ref 4–15)
NEUTROPHILS # BLD AUTO: 5 K/UL (ref 1.8–7.7)
NEUTROPHILS NFR BLD: 75.1 % (ref 38–73)
PLATELET # BLD AUTO: 151 K/UL (ref 150–350)
PMV BLD AUTO: 9.6 FL (ref 9.2–12.9)
POTASSIUM SERPL-SCNC: 3.1 MMOL/L (ref 3.5–5.1)
RBC # BLD AUTO: 3.16 M/UL (ref 4.6–6.2)
SODIUM SERPL-SCNC: 136 MMOL/L (ref 136–145)
VDRL CSF QL: NORMAL
WBC # BLD AUTO: 6.7 K/UL (ref 3.9–12.7)

## 2019-04-01 PROCEDURE — 63600175 PHARM REV CODE 636 W HCPCS: Mod: HCNC | Performed by: NURSE PRACTITIONER

## 2019-04-01 PROCEDURE — 25000003 PHARM REV CODE 250: Mod: HCNC | Performed by: INTERNAL MEDICINE

## 2019-04-01 PROCEDURE — 25000003 PHARM REV CODE 250: Mod: HCNC | Performed by: NURSE PRACTITIONER

## 2019-04-01 PROCEDURE — 63600175 PHARM REV CODE 636 W HCPCS: Mod: HCNC | Performed by: INTERNAL MEDICINE

## 2019-04-01 PROCEDURE — 99233 PR SUBSEQUENT HOSPITAL CARE,LEVL III: ICD-10-PCS | Mod: HCNC,,, | Performed by: INTERNAL MEDICINE

## 2019-04-01 PROCEDURE — 21400001 HC TELEMETRY ROOM: Mod: HCNC

## 2019-04-01 PROCEDURE — 99900035 HC TECH TIME PER 15 MIN (STAT): Mod: HCNC

## 2019-04-01 PROCEDURE — 36415 COLL VENOUS BLD VENIPUNCTURE: CPT | Mod: HCNC

## 2019-04-01 PROCEDURE — 94761 N-INVAS EAR/PLS OXIMETRY MLT: CPT | Mod: HCNC

## 2019-04-01 PROCEDURE — 85025 COMPLETE CBC W/AUTO DIFF WBC: CPT | Mod: HCNC

## 2019-04-01 PROCEDURE — 99233 SBSQ HOSP IP/OBS HIGH 50: CPT | Mod: HCNC,,, | Performed by: INTERNAL MEDICINE

## 2019-04-01 PROCEDURE — 80048 BASIC METABOLIC PNL TOTAL CA: CPT | Mod: HCNC

## 2019-04-01 RX ORDER — SODIUM CHLORIDE, SODIUM LACTATE, POTASSIUM CHLORIDE, CALCIUM CHLORIDE 600; 310; 30; 20 MG/100ML; MG/100ML; MG/100ML; MG/100ML
INJECTION, SOLUTION INTRAVENOUS CONTINUOUS
Status: DISCONTINUED | OUTPATIENT
Start: 2019-04-01 | End: 2019-04-04 | Stop reason: HOSPADM

## 2019-04-01 RX ORDER — ONDANSETRON 2 MG/ML
4 INJECTION INTRAMUSCULAR; INTRAVENOUS EVERY 6 HOURS PRN
Status: DISCONTINUED | OUTPATIENT
Start: 2019-04-01 | End: 2019-04-04 | Stop reason: HOSPADM

## 2019-04-01 RX ORDER — AMITRIPTYLINE HYDROCHLORIDE 25 MG/1
25 TABLET, FILM COATED ORAL NIGHTLY
Status: DISCONTINUED | OUTPATIENT
Start: 2019-04-01 | End: 2019-04-04 | Stop reason: HOSPADM

## 2019-04-01 RX ADMIN — ACETAMINOPHEN 650 MG: 325 TABLET ORAL at 12:04

## 2019-04-01 RX ADMIN — HYDRALAZINE HYDROCHLORIDE 10 MG: 10 TABLET, FILM COATED ORAL at 05:04

## 2019-04-01 RX ADMIN — CEFTRIAXONE 2 G: 2 INJECTION, SOLUTION INTRAVENOUS at 05:04

## 2019-04-01 RX ADMIN — SODIUM BICARBONATE 650 MG TABLET 650 MG: at 02:04

## 2019-04-01 RX ADMIN — SODIUM BICARBONATE 650 MG TABLET 650 MG: at 08:04

## 2019-04-01 RX ADMIN — SODIUM CHLORIDE, SODIUM LACTATE, POTASSIUM CHLORIDE, AND CALCIUM CHLORIDE: .6; .31; .03; .02 INJECTION, SOLUTION INTRAVENOUS at 02:04

## 2019-04-01 RX ADMIN — ONDANSETRON 4 MG: 2 INJECTION INTRAMUSCULAR; INTRAVENOUS at 02:04

## 2019-04-01 RX ADMIN — HEPARIN SODIUM 5000 UNITS: 5000 INJECTION, SOLUTION INTRAVENOUS; SUBCUTANEOUS at 09:04

## 2019-04-01 RX ADMIN — HYDRALAZINE HYDROCHLORIDE 10 MG: 10 TABLET, FILM COATED ORAL at 09:04

## 2019-04-01 RX ADMIN — GUAIFENESIN 600 MG: 600 TABLET, EXTENDED RELEASE ORAL at 08:04

## 2019-04-01 RX ADMIN — TRAMADOL HYDROCHLORIDE 50 MG: 50 TABLET, COATED ORAL at 08:04

## 2019-04-01 RX ADMIN — GUAIFENESIN 600 MG: 600 TABLET, EXTENDED RELEASE ORAL at 09:04

## 2019-04-01 RX ADMIN — PANTOPRAZOLE SODIUM 40 MG: 40 TABLET, DELAYED RELEASE ORAL at 08:04

## 2019-04-01 RX ADMIN — CEFTRIAXONE 2 G: 2 INJECTION, SOLUTION INTRAVENOUS at 06:04

## 2019-04-01 RX ADMIN — TRAMADOL HYDROCHLORIDE 50 MG: 50 TABLET, COATED ORAL at 04:04

## 2019-04-01 RX ADMIN — HYDRALAZINE HYDROCHLORIDE 10 MG: 10 TABLET, FILM COATED ORAL at 02:04

## 2019-04-01 RX ADMIN — SODIUM BICARBONATE 650 MG TABLET 650 MG: at 09:04

## 2019-04-01 RX ADMIN — ACETAMINOPHEN 650 MG: 325 TABLET ORAL at 04:04

## 2019-04-01 RX ADMIN — AMLODIPINE BESYLATE 10 MG: 10 TABLET ORAL at 08:04

## 2019-04-01 RX ADMIN — AMITRIPTYLINE HYDROCHLORIDE 25 MG: 25 TABLET, FILM COATED ORAL at 09:04

## 2019-04-01 RX ADMIN — HEPARIN SODIUM 5000 UNITS: 5000 INJECTION, SOLUTION INTRAVENOUS; SUBCUTANEOUS at 05:04

## 2019-04-01 RX ADMIN — TRAMADOL HYDROCHLORIDE 50 MG: 50 TABLET, COATED ORAL at 02:04

## 2019-04-01 RX ADMIN — CARVEDILOL 25 MG: 12.5 TABLET, FILM COATED ORAL at 08:04

## 2019-04-01 RX ADMIN — HEPARIN SODIUM 5000 UNITS: 5000 INJECTION, SOLUTION INTRAVENOUS; SUBCUTANEOUS at 02:04

## 2019-04-01 RX ADMIN — CARVEDILOL 25 MG: 12.5 TABLET, FILM COATED ORAL at 06:04

## 2019-04-01 RX ADMIN — ISOSORBIDE MONONITRATE 60 MG: 60 TABLET, EXTENDED RELEASE ORAL at 08:04

## 2019-04-01 NOTE — ASSESSMENT & PLAN NOTE
Acute kidney injury has improved.  Patient not eating very well.  Will restart IV fluids today and follow very closely at this point.  Recheck procalcitonin.

## 2019-04-01 NOTE — PLAN OF CARE
04/01/19 1507   Post-Acute Status   Post-Acute Authorization Placement   Post-Acute Placement Status Referrals Sent     Met with patient and his wife. If they need skilled placement they would like to go to [facility], If they are able to discharge home they would like [facility]. Referrals faxed to both via [employer].

## 2019-04-01 NOTE — PROGRESS NOTES
[facility] Hospital Medicine  Progress Note    Patient Name: Kodi Ribeiro  MRN: [id number]  Patient Class: IP- Inpatient   Admission Date: 3/27/2019  Length of Stay: 4 days  Attending Physician: Stefan Leary MD  Primary Care Provider: Norma Nuñez MD        Subjective:     Principal Problem:Acute encephalopathy    HPI:   69-year-old male patient with extensive medical history presents today with complaint of neck pain that started over the weekend.  Patient reports he was seen in the emergency room a couple days ago was given pain medication and muscle relaxer and discharged home.  Patient reports that treatment with outpatient medications not effective and continues to complain of this pain.  Range of motion of neck worsens.  Associated symptoms include some confusion and weakness endorsed by patient's wife at bedside.  Additionally patient's wife reports patient has not been taking his regular medicines over the last couple of days because she was worried only about his pain and treating that and forgot about his other medicines.  Patient was evaluated in the emergency room.   Significant labs creatinine of 4.8 and BUN of 4.2.  Per emergency room note  No acute findings on CT of cervical spine and CT head.   Hospital Medicine consult.  Patient placed in observation.    Hospital Course:  Patient admitted to hospital with CC Neck Pain. Patient placed on BS abx. Concern is for meningitis as patient continues with AMS, febrile episodes and c/o Neck Pain 10/10. Family at bedside. ID consulted. Patient covered broadly. Patient unable to obtain LP per IR due to his last receiving plavix on Sunday.  Dr Curtis has agreed to do LP Thursday. CT head negative and MRI unable to obtain due to patient uncooperativity / claustrophobia per family. Patient was responding to query correctly at time of exam. He was able to tell me where he is, the year, and the president. Family reports this is an  improvement from earlier today. POC discussed in detail.  28 March successful lumbar puncture.  Renal function improving steadily.  Intermittent upper back and neck pain.  No fevers.  CSF HSV1/2 and VZV PCR negative - Stopped Acyclovir.    Interval History:  Renal function improving steadily.  Intermittent upper back and neck pain improving.  No fevers.  HHV DNA tests on CSF negative.    Review of Systems   Constitutional: Positive for activity change. Negative for chills, fatigue and fever.   HENT: Negative.  Negative for congestion and sore throat.    Eyes: Negative.  Negative for visual disturbance.   Respiratory: Negative.  Negative for cough, shortness of breath and wheezing.    Cardiovascular: Negative.  Negative for chest pain.   Gastrointestinal: Negative for abdominal pain, diarrhea, nausea and vomiting.   Endocrine: Negative.    Genitourinary: Negative.    Musculoskeletal: Positive for neck pain. Negative for myalgias and neck stiffness.   Skin: Negative.  Negative for color change and pallor.   Allergic/Immunologic: Negative.    Neurological: Positive for weakness.   Hematological: Negative.    Psychiatric/Behavioral: Negative.  Negative for agitation and confusion.   All other systems reviewed and are negative.    Objective:     Vital Signs (Most Recent):  Temp: 97.4 °F (36.3 °C) (03/31/19 1607)  Pulse: 66 (03/31/19 1607)  Resp: 18 (03/31/19 1607)  BP: (!) 176/78 (03/31/19 1607)  SpO2: 95 % (03/31/19 1607) Vital Signs (24h Range):  Temp:  [97.4 °F (36.3 °C)-98.1 °F (36.7 °C)] 97.4 °F (36.3 °C)  Pulse:  [61-77] 66  Resp:  [16-20] 18  SpO2:  [93 %-99 %] 95 %  BP: (144-176)/(64-78) 176/78     Weight: 87.1 kg (192 lb)  Body mass index is 25.33 kg/m².    Intake/Output Summary (Last 24 hours) at 3/31/2019 1956  Last data filed at 3/31/2019 1732  Gross per 24 hour   Intake 940 ml   Output --   Net 940 ml      Physical Exam   Constitutional: He appears well-developed and well-nourished.   HENT:   Head:  Normocephalic and atraumatic.   Eyes: Pupils are equal, round, and reactive to light. EOM are normal.   Neck: Normal range of motion. No JVD present. Muscular tenderness present. Neck rigidity present.   Cardiovascular: Regular rhythm and intact distal pulses. Tachycardia present.   Murmur heard.   Systolic murmur is present with a grade of 3/6.  Pulmonary/Chest: Effort normal. No respiratory distress. He has no wheezes. He has rhonchi in the right middle field.   Abdominal: Soft. Bowel sounds are normal.   Musculoskeletal: Normal range of motion. He exhibits no deformity.   Neurological: He is alert. He has normal reflexes.   Skin: Skin is warm and dry. Capillary refill takes 2 to 3 seconds.   Nursing note and vitals reviewed.      Significant Labs:   ABGs: No results for input(s): PH, PCO2, HCO3, POCSATURATED, BE, TOTALHB, COHB, METHB, O2HB, POCFIO2 in the last 48 hours.  Blood Culture:   No results for input(s): LABBLOO in the last 48 hours.  BMP:   Recent Labs   Lab 03/31/19  0638   *      K 3.7      CO2 17*   BUN 53*   CREATININE 1.9*   CALCIUM 8.4*     CBC:   Recent Labs   Lab 03/30/19  0507 03/31/19  0638   WBC 5.41 5.00   HGB 9.0* 8.8*   HCT 28.0* 26.7*    150     CMP:   Recent Labs   Lab 03/30/19  0507 03/31/19  0638    136   K 3.9 3.7    108   CO2 15* 17*   * 174*   BUN 54* 53*   CREATININE 2.1* 1.9*   CALCIUM 8.5* 8.4*   ANIONGAP 13 11   EGFRNONAA 31* 35*     Troponin:   No results for input(s): TROPONINI in the last 48 hours.  Urine Studies:   No results for input(s): COLORU, APPEARANCEUA, PHUR, SPECGRAV, PROTEINUA, GLUCUA, KETONESU, BILIRUBINUA, OCCULTUA, NITRITE, UROBILINOGEN, LEUKOCYTESUR, RBCUA, WBCUA, BACTERIA, SQUAMEPITHEL, HYALINECASTS in the last 48 hours.    Invalid input(s): WRIGHTSUR  All pertinent labs within the past 24 hours have been reviewed.    Significant Imaging: I have reviewed all pertinent imaging results/findings within the past 24  hours.    Assessment/Plan:      * Acute encephalopathy  Related to meds and high blood pressure suspected.  Resume home meds to control blood pressure.  CT of head negative for any acute findings at this time.  UA negative.  Try to avoid narcotics or any sedated or mood altering medications.  Neuro checks.  Consider Neurology consult pending course.  Infectious disease evaluated and following up recommendations.  Successful LP on 28 March with initial analysis indicating meningitis.  Continuing empiric treatment.  Stopped Acyclovir.    Debility    Pt/ot eval, dietary consult, supportive care, social work consult.     Neck pain    CT of neck negative for any acute findings per virtual read, CT of head negative for any acute findings per virtual read.    No meningeal signs on exam.  No WBC, afebrile.  Infectious process not suspected.    Goal for better control the BP.  Consider ultrasound bilateral carotids and are further imaging pending course.    Correct electrolyte abnormalities and acute on chronic renal failure.    Tylenol   P.r.n.   try to avoid any narcotics for altering medications, avoid NSAIDs.  Consider dose of steroid to assist with pain pending course    Trend troponins and consider further cardiac workup pending course.  Further evaluation/diagnostics/interventions/consults pending course     3/26  LP plan in progress  Consider MRI Head / Neck with sedation  CT Negative       Hypomagnesemia    Replace.  Monitor.      Acute renal failure superimposed on chronic kidney disease    Patient wife endorses decreased appetite and oral intake at home.  IV bolus given in ER.  Repeat labs this morning.  Continue gentle IV hydration    patient has not been taking his medications at home as prescribed.  Blood pressure elevated.   Nitropaste and assess response, resume home meds  For Bp control.   check chest x-ray and abdominal KUB.  Consider checking ultrasound of kidneys pending course.  Further  evaluation/diagnostics/interventions/consults pending course     3/27  Improving with fluids  Monitor  Cr now 4.2        History of CVA (cerebrovascular accident)    PT/OT eval.  Social work consult.  Supportive care.  CT of head today no acute findings.  Neuro checks monitor.  Further evaluation/diagnostics/interventions/consults pending course         Essential hypertension    Nitropaste to chest wall this time.  Resume home meds.    Patient and wife encouraged to follow plan of care and medication regimens.        VTE Risk Mitigation (From admission, onward)        Ordered     heparin (porcine) injection 5,000 Units  Every 8 hours      03/27/19 0518     Place MOISES hose  Until discontinued      03/27/19 0518     Place sequential compression device  Until discontinued      03/27/19 0518              Stefan Leary MD  Department of Hospital Medicine   [facility]

## 2019-04-01 NOTE — PROGRESS NOTES
[facility] Hospital Medicine  Progress Note    Patient Name: Kodi Ribeiro  MRN: [id number]  Patient Class: IP- Inpatient   Admission Date: 3/27/2019  Length of Stay: 5 days  Attending Physician: Stefan Leary MD  Primary Care Provider: Norma Nuñez MD        Subjective:     Principal Problem:Acute encephalopathy    HPI:   69-year-old male patient with extensive medical history presents today with complaint of neck pain that started over the weekend.  Patient reports he was seen in the emergency room a couple days ago was given pain medication and muscle relaxer and discharged home.  Patient reports that treatment with outpatient medications not effective and continues to complain of this pain.  Range of motion of neck worsens.  Associated symptoms include some confusion and weakness endorsed by patient's wife at bedside.  Additionally patient's wife reports patient has not been taking his regular medicines over the last couple of days because she was worried only about his pain and treating that and forgot about his other medicines.  Patient was evaluated in the emergency room.   Significant labs creatinine of 4.8 and BUN of 4.2.  Per emergency room note  No acute findings on CT of cervical spine and CT head.   Hospital Medicine consult.  Patient placed in observation.    Hospital Course:  Patient admitted to hospital with CC Neck Pain. Patient placed on BS abx. Concern is for meningitis as patient continues with AMS, febrile episodes and c/o Neck Pain 10/10. Family at bedside. ID consulted. Patient covered broadly. Patient unable to obtain LP per IR due to his last receiving plavix on Sunday.  Dr Curtis has agreed to do LP Thursday. CT head negative and MRI unable to obtain due to patient uncooperativity / claustrophobia per family. Patient was responding to query correctly at time of exam. He was able to tell me where he is, the year, and the president. Family reports this is an  improvement from earlier today. POC discussed in detail.  28 March successful lumbar puncture.  Renal function improving steadily.  Intermittent upper back and neck pain.  No fevers.  CSF HSV1/2 and VZV PCR negative - Stopped Acyclovir.  Added Amitriptyline 25 mg at bedtime to help with insomnia and recurrent pain.    Interval History:  Added Amitriptyline 25 mg at bedtime to help with insomnia and recurrent pain.  Renal function improving steadily.  Intermittent upper back and neck pain improving.  No fevers.    Review of Systems   Constitutional: Positive for activity change. Negative for chills, fatigue and fever.   HENT: Negative.  Negative for congestion and sore throat.    Eyes: Negative.  Negative for visual disturbance.   Respiratory: Negative.  Negative for cough, shortness of breath and wheezing.    Cardiovascular: Negative.  Negative for chest pain.   Gastrointestinal: Negative for abdominal pain, diarrhea, nausea and vomiting.   Endocrine: Negative.    Genitourinary: Negative.    Musculoskeletal: Positive for neck pain. Negative for myalgias and neck stiffness.   Skin: Negative.  Negative for color change and pallor.   Allergic/Immunologic: Negative.    Neurological: Positive for weakness.   Hematological: Negative.    Psychiatric/Behavioral: Negative.  Negative for agitation and confusion.   All other systems reviewed and are negative.    Objective:     Vital Signs (Most Recent):  Temp: 97.7 °F (36.5 °C) (04/01/19 1657)  Pulse: 76 (04/01/19 1657)  Resp: 18 (04/01/19 1657)  BP: (!) 183/81 (04/01/19 1657)  SpO2: 95 % (04/01/19 1657) Vital Signs (24h Range):  Temp:  [97.3 °F (36.3 °C)-97.9 °F (36.6 °C)] 97.7 °F (36.5 °C)  Pulse:  [59-76] 76  Resp:  [16-20] 18  SpO2:  [93 %-98 %] 95 %  BP: (149-191)/(70-83) 183/81     Weight: 87.1 kg (192 lb)  Body mass index is 25.33 kg/m².    Intake/Output Summary (Last 24 hours) at 4/1/2019 1842  Last data filed at 4/1/2019 1500  Gross per 24 hour   Intake 0 ml   Output  --   Net 0 ml      Physical Exam   Constitutional: He appears well-developed and well-nourished.   HENT:   Head: Normocephalic and atraumatic.   Eyes: Pupils are equal, round, and reactive to light. EOM are normal.   Neck: Normal range of motion. No JVD present. Muscular tenderness present. Neck rigidity present.   Cardiovascular: Regular rhythm and intact distal pulses. Tachycardia present.   Murmur heard.   Systolic murmur is present with a grade of 3/6.  Pulmonary/Chest: Effort normal. No respiratory distress. He has no wheezes. He has rhonchi in the right middle field.   Abdominal: Soft. Bowel sounds are normal.   Musculoskeletal: Normal range of motion. He exhibits no deformity.   Neurological: He is alert. He has normal reflexes.   Skin: Skin is warm and dry. Capillary refill takes 2 to 3 seconds.   Nursing note and vitals reviewed.      Significant Labs:   ABGs: No results for input(s): PH, PCO2, HCO3, POCSATURATED, BE, TOTALHB, COHB, METHB, O2HB, POCFIO2 in the last 48 hours.  Blood Culture:   No results for input(s): LABBLOO in the last 48 hours.  BMP:   Recent Labs   Lab 04/01/19  0453   *      K 3.1*      CO2 19*   BUN 48*   CREATININE 1.7*   CALCIUM 8.1*     CBC:   Recent Labs   Lab 03/31/19  0638 04/01/19  0453   WBC 5.00 6.70   HGB 8.8* 8.7*   HCT 26.7* 27.2*    151     CMP:   Recent Labs   Lab 03/31/19  0638 04/01/19  0453    136   K 3.7 3.1*    108   CO2 17* 19*   * 132*   BUN 53* 48*   CREATININE 1.9* 1.7*   CALCIUM 8.4* 8.1*   ANIONGAP 11 9   EGFRNONAA 35* 40*     Troponin:   No results for input(s): TROPONINI in the last 48 hours.  Urine Studies:   No results for input(s): COLORU, APPEARANCEUA, PHUR, SPECGRAV, PROTEINUA, GLUCUA, KETONESU, BILIRUBINUA, OCCULTUA, NITRITE, UROBILINOGEN, LEUKOCYTESUR, RBCUA, WBCUA, BACTERIA, SQUAMEPITHEL, HYALINECASTS in the last 48 hours.    Invalid input(s): LESLY  All pertinent labs within the past 24 hours have been  reviewed.    Significant Imaging: I have reviewed all pertinent imaging results/findings within the past 24 hours.    Assessment/Plan:      * Acute encephalopathy  Related to meds and high blood pressure suspected.  Resume home meds to control blood pressure.  CT of head negative for any acute findings at this time.  UA negative.  Try to avoid narcotics or any sedated or mood altering medications.  Neuro checks.  Consider Neurology consult pending course.  Infectious disease evaluated and following up recommendations.  Successful LP on 28 March with initial analysis indicating meningitis.  Continuing empiric treatment.  Stopped Acyclovir.    Debility    Pt/ot eval, dietary consult, supportive care, social work consult.     Neck pain    CT of neck negative for any acute findings per virtual read, CT of head negative for any acute findings per virtual read.    No meningeal signs on exam.  No WBC, afebrile.  Infectious process not suspected.    Goal for better control the BP.  Consider ultrasound bilateral carotids and are further imaging pending course.    Correct electrolyte abnormalities and acute on chronic renal failure.    Tylenol   P.r.n.   try to avoid any narcotics for altering medications, avoid NSAIDs   Added Amitriptyline at bedtime.    Hypomagnesemia    Replace.  Monitor.      Acute renal failure superimposed on chronic kidney disease    Patient wife endorses decreased appetite and oral intake at home.  IV bolus given in ER.  Repeat labs this morning.  Continue gentle IV hydration    patient has not been taking his medications at home as prescribed.  Blood pressure elevated.   Nitropaste and assess response, resume home meds  For Bp control.   check chest x-ray and abdominal KUB.  Consider checking ultrasound of kidneys pending course.  Further evaluation/diagnostics/interventions/consults pending course     3/27  Improving with fluids  Monitor  Cr now 4.2        History of CVA (cerebrovascular accident)     PT/OT eval.  Social work consult.  Supportive care.  CT of head today no acute findings.  Neuro checks monitor.  Further evaluation/diagnostics/interventions/consults pending course         Essential hypertension    Nitropaste to chest wall this time.  Resume home meds.    Patient and wife encouraged to follow plan of care and medication regimens.        VTE Risk Mitigation (From admission, onward)        Ordered     heparin (porcine) injection 5,000 Units  Every 8 hours      03/27/19 0518     Place MOISES hose  Until discontinued      03/27/19 0518     Place sequential compression device  Until discontinued      03/27/19 0518              Stefan Leary MD  Department of Hospital Medicine   [facility]

## 2019-04-01 NOTE — PLAN OF CARE
04/01/19 1500   Medicare Message   Important Message from Medicare regarding Discharge Appeal Rights Given to patient/caregiver;Signed/date by patient/caregiver;Explained to patient/caregiver   Date IMM was signed 04/01/19   Time IMM was signed 1500

## 2019-04-01 NOTE — PT/OT/SLP PROGRESS
Physical Therapy      Patient Name:  Kodi Ribeiro   MRN:  [id number]    PT attempted tx at 11:15am, pt refused secondary to 10/10 neck pain and wife reports that pt had a bad night. PT will attempt tx at later time/ date in order to continue with POC.    Bonnie Hawk, PT/OT   4/1/2019

## 2019-04-01 NOTE — SUBJECTIVE & OBJECTIVE
Interval History:  Patient complains of lot of pain in the neck overnight.  Not eating well today.  IV fluids restarted.  Creatinine down to 1.7.    Review of patient's allergies indicates:   Allergen Reactions    Morphine Itching     Current Facility-Administered Medications   Medication Frequency    acetaminophen tablet 650 mg Q8H PRN    albuterol-ipratropium 2.5 mg-0.5 mg/3 mL nebulizer solution 3 mL Q8H    amitriptyline tablet 25 mg QHS    amLODIPine tablet 10 mg Daily    carvedilol tablet 25 mg BID WM    cefTRIAXone (ROCEPHIN) 2 g in dextrose 5 % 50 mL IVPB Q12H    docusate sodium capsule 100 mg BID    guaiFENesin 12 hr tablet 600 mg BID    heparin (porcine) injection 5,000 Units Q8H    hydrALAZINE tablet 10 mg Q8H    isosorbide mononitrate 24 hr tablet 60 mg Daily    lactated ringers infusion Continuous    ondansetron injection 4 mg Q6H PRN    pantoprazole EC tablet 40 mg Daily    ramelteon tablet 8 mg Nightly PRN    sodium bicarbonate tablet 650 mg TID    traMADol tablet 50 mg Q6H PRN       Objective:     Vital Signs (Most Recent):  Temp: 97.7 °F (36.5 °C) (04/01/19 1657)  Pulse: 76 (04/01/19 1657)  Resp: 18 (04/01/19 1657)  BP: (!) 183/81 (04/01/19 1657)  SpO2: 95 % (04/01/19 1657)  O2 Device (Oxygen Therapy): room air (04/01/19 0710) Vital Signs (24h Range):  Temp:  [97.3 °F (36.3 °C)-97.9 °F (36.6 °C)] 97.7 °F (36.5 °C)  Pulse:  [59-76] 76  Resp:  [16-20] 18  SpO2:  [93 %-98 %] 95 %  BP: (149-191)/(70-83) 183/81     Weight: 87.1 kg (192 lb) (03/27/19 0235)  Body mass index is 25.33 kg/m².  Body surface area is 2.12 meters squared.    I/O last 3 completed shifts:  In: 940 [P.O.:540; IV Piggyback:400]  Out: -     Physical Exam   Constitutional: He appears well-developed and well-nourished.   HENT:   Head: Normocephalic and atraumatic.   Eyes: Pupils are equal, round, and reactive to light. EOM are normal.   Neck: Normal range of motion. No JVD present. Muscular tenderness present. Neck  rigidity present.   Cardiovascular: Regular rhythm and intact distal pulses. Tachycardia present.   Murmur heard.   Systolic murmur is present with a grade of 3/6.  Pulmonary/Chest: Effort normal. No respiratory distress. He has no wheezes. He has rhonchi in the right middle field.   Abdominal: Soft. Bowel sounds are normal.   Musculoskeletal: Normal range of motion. He exhibits no deformity.   Neurological: He is alert. He has normal reflexes.   Skin: Skin is warm and dry. Capillary refill takes 2 to 3 seconds.   Nursing note and vitals reviewed.      Significant Labs:  All labs within the past 24 hours have been reviewed.     Significant Imaging:  Labs: Reviewed

## 2019-04-01 NOTE — PROGRESS NOTES
[facility] -   Nephrology  Progress Note    Patient Name: Kodi Ribeiro  MRN: [id number]  Admission Date: 3/27/2019  Hospital Length of Stay: 5 days  Attending Provider: Stefan Leary MD   Primary Care Physician: Norma Nuñez MD  Principal Problem:Acute encephalopathy    Subjective:     HPI: Pt was seen and examined. H/o and chart reviewed. Pt is a 70 y/o male with ah/o of HTN and CKD stage 4, who presented with neck pain. W/u was reviewed. CT neck and head unremarkable. Pt did not taje his BP meds while experiencing neck pain. BP elevated. s Cr has worsened form baseline to 4.8, repeat 4.2. Pt's wife says that he takes ibuprofen. According to her he has taken 1-3 tablets 2-3 times a day. She actually says that on some days he has taken 9 per day. Pt has no other c/o's, no sx's of dysuria, no GI losses.    Interval History:  Patient complains of lot of pain in the neck overnight.  Not eating well today.  IV fluids restarted.  Creatinine down to 1.7.    Review of patient's allergies indicates:   Allergen Reactions    Morphine Itching     Current Facility-Administered Medications   Medication Frequency    acetaminophen tablet 650 mg Q8H PRN    albuterol-ipratropium 2.5 mg-0.5 mg/3 mL nebulizer solution 3 mL Q8H    amitriptyline tablet 25 mg QHS    amLODIPine tablet 10 mg Daily    carvedilol tablet 25 mg BID WM    cefTRIAXone (ROCEPHIN) 2 g in dextrose 5 % 50 mL IVPB Q12H    docusate sodium capsule 100 mg BID    guaiFENesin 12 hr tablet 600 mg BID    heparin (porcine) injection 5,000 Units Q8H    hydrALAZINE tablet 10 mg Q8H    isosorbide mononitrate 24 hr tablet 60 mg Daily    lactated ringers infusion Continuous    ondansetron injection 4 mg Q6H PRN    pantoprazole EC tablet 40 mg Daily    ramelteon tablet 8 mg Nightly PRN    sodium bicarbonate tablet 650 mg TID    traMADol tablet 50 mg Q6H PRN       Objective:     Vital Signs (Most Recent):  Temp: 97.7 °F (36.5 °C) (04/01/19  1657)  Pulse: 76 (04/01/19 1657)  Resp: 18 (04/01/19 1657)  BP: (!) 183/81 (04/01/19 1657)  SpO2: 95 % (04/01/19 1657)  O2 Device (Oxygen Therapy): room air (04/01/19 0710) Vital Signs (24h Range):  Temp:  [97.3 °F (36.3 °C)-97.9 °F (36.6 °C)] 97.7 °F (36.5 °C)  Pulse:  [59-76] 76  Resp:  [16-20] 18  SpO2:  [93 %-98 %] 95 %  BP: (149-191)/(70-83) 183/81     Weight: 87.1 kg (192 lb) (03/27/19 0235)  Body mass index is 25.33 kg/m².  Body surface area is 2.12 meters squared.    I/O last 3 completed shifts:  In: 940 [P.O.:540; IV Piggyback:400]  Out: -     Physical Exam   Constitutional: He appears well-developed and well-nourished.   HENT:   Head: Normocephalic and atraumatic.   Eyes: Pupils are equal, round, and reactive to light. EOM are normal.   Neck: Normal range of motion. No JVD present. Muscular tenderness present. Neck rigidity present.   Cardiovascular: Regular rhythm and intact distal pulses. Tachycardia present.   Murmur heard.   Systolic murmur is present with a grade of 3/6.  Pulmonary/Chest: Effort normal. No respiratory distress. He has no wheezes. He has rhonchi in the right middle field.   Abdominal: Soft. Bowel sounds are normal.   Musculoskeletal: Normal range of motion. He exhibits no deformity.   Neurological: He is alert. He has normal reflexes.   Skin: Skin is warm and dry. Capillary refill takes 2 to 3 seconds.   Nursing note and vitals reviewed.      Significant Labs:  All labs within the past 24 hours have been reviewed.     Significant Imaging:  Labs: Reviewed    Assessment/Plan:     Acute renal failure superimposed on chronic kidney disease  Acute kidney injury has improved.  Patient not eating very well.  Will restart IV fluids today and follow very closely at this point.  Recheck procalcitonin.          Thank you for your consult.     Brenton Jacobson MD  Nephrology  [facility] [place]

## 2019-04-01 NOTE — PLAN OF CARE
Problem: Adult Inpatient Plan of Care  Goal: Plan of Care Review  Outcome: Ongoing (interventions implemented as appropriate)  Fall precautions maintained. Pt free from falls/injuries.  Patient complains of pain. Pain controlled with PRN meds.  Antibiotics given as prescribed.  Ambulates and repositions with assistance.  Plan of care and medications discussed with patient.  Patient verbalized understanding.  Bed locked and low, call bell within reach.  Chart check done. Will continue to monitor.

## 2019-04-01 NOTE — PLAN OF CARE
Problem: Adult Inpatient Plan of Care  Goal: Plan of Care Review  Outcome: Ongoing (interventions implemented as appropriate)  Plan of care discussed with patient and spouse. Possible side effects of medications was discussed. No concerns voiced. Pt denies pain at this time. Pain controled with PO medications, positioning, and relaxation techniques. Ambulating well with assistance. Remains free from injuries. Side rails up, nonskid socks placed, call bell within reach, personal area free from clutter. Will continue to monitor

## 2019-04-01 NOTE — SUBJECTIVE & OBJECTIVE
Interval History:  Added Amitriptyline 25 mg at bedtime to help with insomnia and recurrent pain.  Renal function improving steadily.  Intermittent upper back and neck pain improving.  No fevers.    Review of Systems   Constitutional: Positive for activity change. Negative for chills, fatigue and fever.   HENT: Negative.  Negative for congestion and sore throat.    Eyes: Negative.  Negative for visual disturbance.   Respiratory: Negative.  Negative for cough, shortness of breath and wheezing.    Cardiovascular: Negative.  Negative for chest pain.   Gastrointestinal: Negative for abdominal pain, diarrhea, nausea and vomiting.   Endocrine: Negative.    Genitourinary: Negative.    Musculoskeletal: Positive for neck pain. Negative for myalgias and neck stiffness.   Skin: Negative.  Negative for color change and pallor.   Allergic/Immunologic: Negative.    Neurological: Positive for weakness.   Hematological: Negative.    Psychiatric/Behavioral: Negative.  Negative for agitation and confusion.   All other systems reviewed and are negative.    Objective:     Vital Signs (Most Recent):  Temp: 97.7 °F (36.5 °C) (04/01/19 1657)  Pulse: 76 (04/01/19 1657)  Resp: 18 (04/01/19 1657)  BP: (!) 183/81 (04/01/19 1657)  SpO2: 95 % (04/01/19 1657) Vital Signs (24h Range):  Temp:  [97.3 °F (36.3 °C)-97.9 °F (36.6 °C)] 97.7 °F (36.5 °C)  Pulse:  [59-76] 76  Resp:  [16-20] 18  SpO2:  [93 %-98 %] 95 %  BP: (149-191)/(70-83) 183/81     Weight: 87.1 kg (192 lb)  Body mass index is 25.33 kg/m².    Intake/Output Summary (Last 24 hours) at 4/1/2019 1842  Last data filed at 4/1/2019 1500  Gross per 24 hour   Intake 0 ml   Output --   Net 0 ml      Physical Exam   Constitutional: He appears well-developed and well-nourished.   HENT:   Head: Normocephalic and atraumatic.   Eyes: Pupils are equal, round, and reactive to light. EOM are normal.   Neck: Normal range of motion. No JVD present. Muscular tenderness present. Neck rigidity present.    Cardiovascular: Regular rhythm and intact distal pulses. Tachycardia present.   Murmur heard.   Systolic murmur is present with a grade of 3/6.  Pulmonary/Chest: Effort normal. No respiratory distress. He has no wheezes. He has rhonchi in the right middle field.   Abdominal: Soft. Bowel sounds are normal.   Musculoskeletal: Normal range of motion. He exhibits no deformity.   Neurological: He is alert. He has normal reflexes.   Skin: Skin is warm and dry. Capillary refill takes 2 to 3 seconds.   Nursing note and vitals reviewed.      Significant Labs:   ABGs: No results for input(s): PH, PCO2, HCO3, POCSATURATED, BE, TOTALHB, COHB, METHB, O2HB, POCFIO2 in the last 48 hours.  Blood Culture:   No results for input(s): LABBLOO in the last 48 hours.  BMP:   Recent Labs   Lab 04/01/19 0453   *      K 3.1*      CO2 19*   BUN 48*   CREATININE 1.7*   CALCIUM 8.1*     CBC:   Recent Labs   Lab 03/31/19  0638 04/01/19 0453   WBC 5.00 6.70   HGB 8.8* 8.7*   HCT 26.7* 27.2*    151     CMP:   Recent Labs   Lab 03/31/19 0638 04/01/19  0453    136   K 3.7 3.1*    108   CO2 17* 19*   * 132*   BUN 53* 48*   CREATININE 1.9* 1.7*   CALCIUM 8.4* 8.1*   ANIONGAP 11 9   EGFRNONAA 35* 40*     Troponin:   No results for input(s): TROPONINI in the last 48 hours.  Urine Studies:   No results for input(s): COLORU, APPEARANCEUA, PHUR, SPECGRAV, PROTEINUA, GLUCUA, KETONESU, BILIRUBINUA, OCCULTUA, NITRITE, UROBILINOGEN, LEUKOCYTESUR, RBCUA, WBCUA, BACTERIA, SQUAMEPITHEL, HYALINECASTS in the last 48 hours.    Invalid input(s): WRIGHTSUR  All pertinent labs within the past 24 hours have been reviewed.    Significant Imaging: I have reviewed all pertinent imaging results/findings within the past 24 hours.

## 2019-04-01 NOTE — ASSESSMENT & PLAN NOTE
CT of neck negative for any acute findings per virtual read, CT of head negative for any acute findings per virtual read.    No meningeal signs on exam.  No WBC, afebrile.  Infectious process not suspected.    Goal for better control the BP.  Consider ultrasound bilateral carotids and are further imaging pending course.    Correct electrolyte abnormalities and acute on chronic renal failure.    Tylenol   P.r.n.   try to avoid any narcotics for altering medications, avoid NSAIDs   Added Amitriptyline at bedtime.

## 2019-04-01 NOTE — ASSESSMENT & PLAN NOTE
Related to meds and high blood pressure suspected.  Resume home meds to control blood pressure.  CT of head negative for any acute findings at this time.  UA negative.  Try to avoid narcotics or any sedated or mood altering medications.  Neuro checks.  Consider Neurology consult pending course.  Infectious disease evaluated and following up recommendations.  Successful LP on 28 March with initial analysis indicating meningitis.  Continuing empiric treatment.  Stopped Acyclovir.

## 2019-04-01 NOTE — SUBJECTIVE & OBJECTIVE
Interval History:  Renal function improving steadily.  Intermittent upper back and neck pain improving.  No fevers.  HHV DNA tests on CSF negative.    Review of Systems   Constitutional: Positive for activity change. Negative for chills, fatigue and fever.   HENT: Negative.  Negative for congestion and sore throat.    Eyes: Negative.  Negative for visual disturbance.   Respiratory: Negative.  Negative for cough, shortness of breath and wheezing.    Cardiovascular: Negative.  Negative for chest pain.   Gastrointestinal: Negative for abdominal pain, diarrhea, nausea and vomiting.   Endocrine: Negative.    Genitourinary: Negative.    Musculoskeletal: Positive for neck pain. Negative for myalgias and neck stiffness.   Skin: Negative.  Negative for color change and pallor.   Allergic/Immunologic: Negative.    Neurological: Positive for weakness.   Hematological: Negative.    Psychiatric/Behavioral: Negative.  Negative for agitation and confusion.   All other systems reviewed and are negative.    Objective:     Vital Signs (Most Recent):  Temp: 97.4 °F (36.3 °C) (03/31/19 1607)  Pulse: 66 (03/31/19 1607)  Resp: 18 (03/31/19 1607)  BP: (!) 176/78 (03/31/19 1607)  SpO2: 95 % (03/31/19 1607) Vital Signs (24h Range):  Temp:  [97.4 °F (36.3 °C)-98.1 °F (36.7 °C)] 97.4 °F (36.3 °C)  Pulse:  [61-77] 66  Resp:  [16-20] 18  SpO2:  [93 %-99 %] 95 %  BP: (144-176)/(64-78) 176/78     Weight: 87.1 kg (192 lb)  Body mass index is 25.33 kg/m².    Intake/Output Summary (Last 24 hours) at 3/31/2019 1956  Last data filed at 3/31/2019 1732  Gross per 24 hour   Intake 940 ml   Output --   Net 940 ml      Physical Exam   Constitutional: He appears well-developed and well-nourished.   HENT:   Head: Normocephalic and atraumatic.   Eyes: Pupils are equal, round, and reactive to light. EOM are normal.   Neck: Normal range of motion. No JVD present. Muscular tenderness present. Neck rigidity present.   Cardiovascular: Regular rhythm and intact  distal pulses. Tachycardia present.   Murmur heard.   Systolic murmur is present with a grade of 3/6.  Pulmonary/Chest: Effort normal. No respiratory distress. He has no wheezes. He has rhonchi in the right middle field.   Abdominal: Soft. Bowel sounds are normal.   Musculoskeletal: Normal range of motion. He exhibits no deformity.   Neurological: He is alert. He has normal reflexes.   Skin: Skin is warm and dry. Capillary refill takes 2 to 3 seconds.   Nursing note and vitals reviewed.      Significant Labs:   ABGs: No results for input(s): PH, PCO2, HCO3, POCSATURATED, BE, TOTALHB, COHB, METHB, O2HB, POCFIO2 in the last 48 hours.  Blood Culture:   No results for input(s): LABBLOO in the last 48 hours.  BMP:   Recent Labs   Lab 03/31/19  0638   *      K 3.7      CO2 17*   BUN 53*   CREATININE 1.9*   CALCIUM 8.4*     CBC:   Recent Labs   Lab 03/30/19  0507 03/31/19  0638   WBC 5.41 5.00   HGB 9.0* 8.8*   HCT 28.0* 26.7*    150     CMP:   Recent Labs   Lab 03/30/19  0507 03/31/19  0638    136   K 3.9 3.7    108   CO2 15* 17*   * 174*   BUN 54* 53*   CREATININE 2.1* 1.9*   CALCIUM 8.5* 8.4*   ANIONGAP 13 11   EGFRNONAA 31* 35*     Troponin:   No results for input(s): TROPONINI in the last 48 hours.  Urine Studies:   No results for input(s): COLORU, APPEARANCEUA, PHUR, SPECGRAV, PROTEINUA, GLUCUA, KETONESU, BILIRUBINUA, OCCULTUA, NITRITE, UROBILINOGEN, LEUKOCYTESUR, RBCUA, WBCUA, BACTERIA, SQUAMEPITHEL, HYALINECASTS in the last 48 hours.    Invalid input(s): WRIGHTSUR  All pertinent labs within the past 24 hours have been reviewed.    Significant Imaging: I have reviewed all pertinent imaging results/findings within the past 24 hours.

## 2019-04-02 LAB
ANION GAP SERPL CALC-SCNC: 8 MMOL/L (ref 8–16)
BASOPHILS # BLD AUTO: 0 K/UL (ref 0–0.2)
BASOPHILS NFR BLD: 0 % (ref 0–1.9)
BUN SERPL-MCNC: 37 MG/DL (ref 8–23)
CALCIUM SERPL-MCNC: 8.2 MG/DL (ref 8.7–10.5)
CHLORIDE SERPL-SCNC: 104 MMOL/L (ref 95–110)
CO2 SERPL-SCNC: 22 MMOL/L (ref 23–29)
CREAT SERPL-MCNC: 1.6 MG/DL (ref 0.5–1.4)
DIFFERENTIAL METHOD: ABNORMAL
EOSINOPHIL # BLD AUTO: 0.1 K/UL (ref 0–0.5)
EOSINOPHIL NFR BLD: 1.1 % (ref 0–8)
ERYTHROCYTE [DISTWIDTH] IN BLOOD BY AUTOMATED COUNT: 14.9 % (ref 11.5–14.5)
EST. GFR  (AFRICAN AMERICAN): 50 ML/MIN/1.73 M^2
EST. GFR  (NON AFRICAN AMERICAN): 43 ML/MIN/1.73 M^2
GLUCOSE SERPL-MCNC: 144 MG/DL (ref 70–110)
HCT VFR BLD AUTO: 29 % (ref 40–54)
HGB BLD-MCNC: 9.3 G/DL (ref 14–18)
LYMPHOCYTES # BLD AUTO: 0.8 K/UL (ref 1–4.8)
LYMPHOCYTES NFR BLD: 7.6 % (ref 18–48)
MCH RBC QN AUTO: 28.1 PG (ref 27–31)
MCHC RBC AUTO-ENTMCNC: 32.1 G/DL (ref 32–36)
MCV RBC AUTO: 88 FL (ref 82–98)
MONOCYTES # BLD AUTO: 0.5 K/UL (ref 0.3–1)
MONOCYTES NFR BLD: 5.3 % (ref 4–15)
NEUTROPHILS # BLD AUTO: 8.7 K/UL (ref 1.8–7.7)
NEUTROPHILS NFR BLD: 86 % (ref 38–73)
PLATELET # BLD AUTO: 161 K/UL (ref 150–350)
PMV BLD AUTO: 9.9 FL (ref 9.2–12.9)
POTASSIUM SERPL-SCNC: 3.6 MMOL/L (ref 3.5–5.1)
RBC # BLD AUTO: 3.31 M/UL (ref 4.6–6.2)
SODIUM SERPL-SCNC: 134 MMOL/L (ref 136–145)
WBC # BLD AUTO: 10.12 K/UL (ref 3.9–12.7)

## 2019-04-02 PROCEDURE — 97530 THERAPEUTIC ACTIVITIES: CPT | Performed by: PHYSICAL THERAPIST

## 2019-04-02 PROCEDURE — 25000242 PHARM REV CODE 250 ALT 637 W/ HCPCS: Performed by: NURSE PRACTITIONER

## 2019-04-02 PROCEDURE — 85025 COMPLETE CBC W/AUTO DIFF WBC: CPT

## 2019-04-02 PROCEDURE — 25000003 PHARM REV CODE 250: Performed by: INTERNAL MEDICINE

## 2019-04-02 PROCEDURE — 92526 ORAL FUNCTION THERAPY: CPT

## 2019-04-02 PROCEDURE — 96372 THER/PROPH/DIAG INJ SC/IM: CPT

## 2019-04-02 PROCEDURE — 94761 N-INVAS EAR/PLS OXIMETRY MLT: CPT

## 2019-04-02 PROCEDURE — 99232 SBSQ HOSP IP/OBS MODERATE 35: CPT | Mod: ,,, | Performed by: INTERNAL MEDICINE

## 2019-04-02 PROCEDURE — 36415 COLL VENOUS BLD VENIPUNCTURE: CPT

## 2019-04-02 PROCEDURE — 63600175 PHARM REV CODE 636 W HCPCS: Performed by: INTERNAL MEDICINE

## 2019-04-02 PROCEDURE — 25000003 PHARM REV CODE 250: Performed by: NURSE PRACTITIONER

## 2019-04-02 PROCEDURE — 94640 AIRWAY INHALATION TREATMENT: CPT

## 2019-04-02 PROCEDURE — 63600175 PHARM REV CODE 636 W HCPCS: Performed by: NURSE PRACTITIONER

## 2019-04-02 PROCEDURE — 99232 PR SUBSEQUENT HOSPITAL CARE,LEVL II: ICD-10-PCS | Mod: ,,, | Performed by: INTERNAL MEDICINE

## 2019-04-02 PROCEDURE — 25000003 PHARM REV CODE 250: Performed by: HOSPITALIST

## 2019-04-02 PROCEDURE — 80048 BASIC METABOLIC PNL TOTAL CA: CPT

## 2019-04-02 PROCEDURE — 21400001 HC TELEMETRY ROOM

## 2019-04-02 PROCEDURE — 97803 MED NUTRITION INDIV SUBSEQ: CPT

## 2019-04-02 PROCEDURE — 99900035 HC TECH TIME PER 15 MIN (STAT)

## 2019-04-02 PROCEDURE — 86703 HIV-1/HIV-2 1 RESULT ANTBDY: CPT

## 2019-04-02 RX ADMIN — ONDANSETRON 4 MG: 2 INJECTION INTRAMUSCULAR; INTRAVENOUS at 11:04

## 2019-04-02 RX ADMIN — TRAMADOL HYDROCHLORIDE 50 MG: 50 TABLET, COATED ORAL at 12:04

## 2019-04-02 RX ADMIN — SODIUM BICARBONATE 650 MG TABLET 650 MG: at 09:04

## 2019-04-02 RX ADMIN — GUAIFENESIN 600 MG: 600 TABLET, EXTENDED RELEASE ORAL at 09:04

## 2019-04-02 RX ADMIN — HEPARIN SODIUM 5000 UNITS: 5000 INJECTION, SOLUTION INTRAVENOUS; SUBCUTANEOUS at 05:04

## 2019-04-02 RX ADMIN — ACETAMINOPHEN 650 MG: 325 TABLET ORAL at 09:04

## 2019-04-02 RX ADMIN — CEFTRIAXONE 2 G: 2 INJECTION, SOLUTION INTRAVENOUS at 05:04

## 2019-04-02 RX ADMIN — TRAMADOL HYDROCHLORIDE 50 MG: 50 TABLET, COATED ORAL at 07:04

## 2019-04-02 RX ADMIN — AMITRIPTYLINE HYDROCHLORIDE 25 MG: 25 TABLET, FILM COATED ORAL at 09:04

## 2019-04-02 RX ADMIN — HEPARIN SODIUM 5000 UNITS: 5000 INJECTION, SOLUTION INTRAVENOUS; SUBCUTANEOUS at 09:04

## 2019-04-02 RX ADMIN — AMLODIPINE BESYLATE 10 MG: 10 TABLET ORAL at 09:04

## 2019-04-02 RX ADMIN — CARVEDILOL 25 MG: 12.5 TABLET, FILM COATED ORAL at 07:04

## 2019-04-02 RX ADMIN — HEPARIN SODIUM 5000 UNITS: 5000 INJECTION, SOLUTION INTRAVENOUS; SUBCUTANEOUS at 02:04

## 2019-04-02 RX ADMIN — TRAMADOL HYDROCHLORIDE 50 MG: 50 TABLET, COATED ORAL at 11:04

## 2019-04-02 RX ADMIN — IPRATROPIUM BROMIDE AND ALBUTEROL SULFATE 3 ML: .5; 3 SOLUTION RESPIRATORY (INHALATION) at 03:04

## 2019-04-02 RX ADMIN — ACETAMINOPHEN 650 MG: 325 TABLET ORAL at 11:04

## 2019-04-02 RX ADMIN — CARVEDILOL 25 MG: 12.5 TABLET, FILM COATED ORAL at 05:04

## 2019-04-02 RX ADMIN — HYDRALAZINE HYDROCHLORIDE 10 MG: 10 TABLET, FILM COATED ORAL at 02:04

## 2019-04-02 RX ADMIN — RAMELTEON 8 MG: 8 TABLET, FILM COATED ORAL at 11:04

## 2019-04-02 RX ADMIN — SODIUM CHLORIDE, SODIUM LACTATE, POTASSIUM CHLORIDE, AND CALCIUM CHLORIDE: .6; .31; .03; .02 INJECTION, SOLUTION INTRAVENOUS at 11:04

## 2019-04-02 RX ADMIN — SODIUM BICARBONATE 650 MG TABLET 650 MG: at 02:04

## 2019-04-02 RX ADMIN — HYDRALAZINE HYDROCHLORIDE 10 MG: 10 TABLET, FILM COATED ORAL at 05:04

## 2019-04-02 RX ADMIN — PANTOPRAZOLE SODIUM 40 MG: 40 TABLET, DELAYED RELEASE ORAL at 09:04

## 2019-04-02 RX ADMIN — ACETAMINOPHEN 650 MG: 325 TABLET ORAL at 04:04

## 2019-04-02 RX ADMIN — HYDRALAZINE HYDROCHLORIDE 10 MG: 10 TABLET, FILM COATED ORAL at 09:04

## 2019-04-02 RX ADMIN — TRAMADOL HYDROCHLORIDE 50 MG: 50 TABLET, COATED ORAL at 03:04

## 2019-04-02 RX ADMIN — ISOSORBIDE MONONITRATE 60 MG: 60 TABLET, EXTENDED RELEASE ORAL at 09:04

## 2019-04-02 NOTE — PROGRESS NOTES
[facility] -   Nephrology  Progress Note    Patient Name: Kodi Ribeiro  MRN: [id number]  Admission Date: 3/27/2019  Hospital Length of Stay: 6 days  Attending Provider: Stefan Leary MD   Primary Care Physician: Norma Nuñez MD  Principal Problem:Acute encephalopathy    Subjective:     HPI: Pt was seen and examined. H/o and chart reviewed. Pt is a 70 y/o male with ah/o of HTN and CKD stage 4, who presented with neck pain. W/u was reviewed. CT neck and head unremarkable. Pt did not taje his BP meds while experiencing neck pain. BP elevated. s Cr has worsened form baseline to 4.8, repeat 4.2. Pt's wife says that he takes ibuprofen. According to her he has taken 1-3 tablets 2-3 times a day. She actually says that on some days he has taken 9 per day. Pt has no other c/o's, no sx's of dysuria, no GI losses.    Interval History: MARCELA is better lower the IVF 50 cc/hour     Review of patient's allergies indicates:   Allergen Reactions    Morphine Itching     Current Facility-Administered Medications   Medication Frequency    acetaminophen tablet 650 mg Q8H PRN    albuterol-ipratropium 2.5 mg-0.5 mg/3 mL nebulizer solution 3 mL Q8H    amitriptyline tablet 25 mg QHS    amLODIPine tablet 10 mg Daily    carvedilol tablet 25 mg BID WM    cefTRIAXone (ROCEPHIN) 2 g in dextrose 5 % 50 mL IVPB Q12H    docusate sodium capsule 100 mg BID    guaiFENesin 12 hr tablet 600 mg BID    heparin (porcine) injection 5,000 Units Q8H    hydrALAZINE tablet 10 mg Q8H    isosorbide mononitrate 24 hr tablet 60 mg Daily    lactated ringers infusion Continuous    ondansetron injection 4 mg Q6H PRN    pantoprazole EC tablet 40 mg Daily    ramelteon tablet 8 mg Nightly PRN    sodium bicarbonate tablet 650 mg TID    traMADol tablet 50 mg Q6H PRN       Objective:     Vital Signs (Most Recent):  Temp: 97.4 °F (36.3 °C) (04/02/19 1238)  Pulse: 65 (04/02/19 1238)  Resp: 16 (04/02/19 1238)  BP: (!) 109/57 (04/02/19  1238)  SpO2: 95 % (04/02/19 1238)  O2 Device (Oxygen Therapy): room air (04/02/19 1238) Vital Signs (24h Range):  Temp:  [97.4 °F (36.3 °C)-98.4 °F (36.9 °C)] 97.4 °F (36.3 °C)  Pulse:  [65-80] 65  Resp:  [16-18] 16  SpO2:  [92 %-97 %] 95 %  BP: (109-183)/(57-81) 109/57     Weight: 87.1 kg (192 lb) (03/27/19 0235)  Body mass index is 25.33 kg/m².  Body surface area is 2.12 meters squared.    I/O last 3 completed shifts:  In: 1663.3 [I.V.:1513.3; IV Piggyback:150]  Out: -     Physical Exam   Constitutional: He appears well-developed and well-nourished.   HENT:   Head: Normocephalic and atraumatic.   Eyes: Pupils are equal, round, and reactive to light. EOM are normal.   Neck: Normal range of motion. No JVD present. Muscular tenderness present. Neck rigidity present.   Cardiovascular: Regular rhythm and intact distal pulses. Tachycardia present.   Murmur heard.   Systolic murmur is present with a grade of 3/6.  Pulmonary/Chest: Effort normal. No respiratory distress. He has no wheezes. He has rhonchi in the right middle field.   Abdominal: Soft. Bowel sounds are normal.   Musculoskeletal: Normal range of motion. He exhibits no deformity.   Neurological: He is alert. He has normal reflexes.   Skin: Skin is warm and dry. Capillary refill takes 2 to 3 seconds.   Nursing note and vitals reviewed.      Significant Labs:  All labs within the past 24 hours have been reviewed.     Significant Imaging:      Assessment/Plan:     Acute renal failure superimposed on chronic kidney disease  Acute kidney injury has improved.  MARCELA is better lower the IVF 50 cc/hour         Thank you for your consult.     Brenton Jacobson MD  Nephrology  [facility]

## 2019-04-02 NOTE — ASSESSMENT & PLAN NOTE
3/28- CSF reviewed -consistent with meningitis , this can be bacterial versus viral .    Will continue present antimicrobial therapy but will need cultures to adjust therapy .  Will continue Acyclovir, continue Rocephin, Vancomycin, Ampicillin     3/29- will stop vancomycin in AM, all cultures remain negative till date (was already on antibiotics prior to LP), , will continue Rocephin , vancomycin and acyclovir for now.      4/1- herpes PCR and varicella PCR in CSF -negative.  Will plan to complete 10 days of Rocephin.   Check HIV

## 2019-04-02 NOTE — PLAN OF CARE
Problem: SLP Goal  Goal: SLP Goal  1. Ongoing swallow assessment for safety of least restrictive diet.  2. Pt will complete oral motor/pharyngeal/laryngeal ex x 15 each with mod cues to improve swallow function.    Outcome: Ongoing (interventions implemented as appropriate)  Pt completed swallow ex x 15 each and tolerated thin liquids without difficulty.

## 2019-04-02 NOTE — SUBJECTIVE & OBJECTIVE
Interval History:   03/29 -he is more awake and alert today ,cultures-pending , wife is in the room at this time.   4/1- he is afebrile, he is complaining of neck pain   Review of Systems   Constitutional: Negative for activity change, appetite change, chills, diaphoresis and fatigue.   HENT: Negative for congestion, dental problem, ear discharge, ear pain and facial swelling.    Eyes: Negative for pain, discharge and itching.   Respiratory: Negative for apnea, cough, chest tightness and shortness of breath.    Cardiovascular: Negative for chest pain and leg swelling.   Gastrointestinal: Negative for abdominal distention and abdominal pain.   Endocrine: Negative for cold intolerance, heat intolerance and polydipsia.   Genitourinary: Negative for difficulty urinating, dysuria and enuresis.   Musculoskeletal: Negative for arthralgias and back pain.   Skin: Negative for color change and pallor.   Allergic/Immunologic: Negative for environmental allergies and food allergies.   Neurological: Negative for dizziness, facial asymmetry, light-headedness and headaches.   Hematological: Negative for adenopathy. Does not bruise/bleed easily.   Psychiatric/Behavioral: Negative for agitation and behavioral problems.     Objective:     Vital Signs (Most Recent):  Temp: 98 °F (36.7 °C) (04/02/19 0435)  Pulse: 80 (04/02/19 0546)  Resp: 18 (04/02/19 0435)  BP: 118/62 (04/02/19 0500)  SpO2: (!) 92 % (04/02/19 0435) Vital Signs (24h Range):  Temp:  [97.7 °F (36.5 °C)-98 °F (36.7 °C)] 98 °F (36.7 °C)  Pulse:  [66-80] 80  Resp:  [16-20] 18  SpO2:  [92 %-98 %] 92 %  BP: (117-183)/(62-81) 118/62     Weight: 87.1 kg (192 lb)  Body mass index is 25.33 kg/m².    Estimated Creatinine Clearance: 48.6 mL/min (A) (based on SCr of 1.6 mg/dL (H)).    Physical Exam   Constitutional: He appears well-developed and well-nourished.   HENT:   Head: Normocephalic and atraumatic.   Eyes: Pupils are equal, round, and reactive to light. EOM are normal.    Neck: Normal range of motion. No JVD present. Muscular tenderness present. Neck rigidity present.   Cardiovascular: Regular rhythm and intact distal pulses. Tachycardia present.   Murmur heard.   Systolic murmur is present with a grade of 3/6.  Pulmonary/Chest: Effort normal. No respiratory distress. He has no wheezes. He has rhonchi in the right middle field.   Abdominal: Soft. Bowel sounds are normal.   Musculoskeletal: Normal range of motion. He exhibits no deformity.   Neurological: He is alert. He has normal reflexes.   Skin: Skin is warm and dry. Capillary refill takes 2 to 3 seconds.   Nursing note and vitals reviewed.       Significant Labs:   CBC:   Recent Labs   Lab 04/01/19  0453 04/02/19  0426   WBC 6.70 10.12   HGB 8.7* 9.3*   HCT 27.2* 29.0*    161     All pertinent labs within the past 24 hours have been reviewed.    Significant Imaging: I have reviewed all pertinent imaging results/findings within the past 24 hours.

## 2019-04-02 NOTE — PROGRESS NOTES
[facility]  Infectious Disease  Progress Note    Patient Name: Kodi Ribeiro  MRN: [id number]  Admission Date: 3/27/2019  Length of Stay: 6 days  Attending Physician: Stefan Leary MD  Primary Care Provider: Norma Nuñez MD    Isolation Status: No active isolations  Assessment/Plan:      Meningitis  3/28- CSF reviewed -consistent with meningitis , this can be bacterial versus viral .    Will continue present antimicrobial therapy but will need cultures to adjust therapy .  Will continue Acyclovir, continue Rocephin, Vancomycin, Ampicillin     3/29- will stop vancomycin in AM, all cultures remain negative till date (was already on antibiotics prior to LP), , will continue Rocephin , vancomycin and acyclovir for now.      4/1- herpes PCR and varicella PCR in CSF -negative.  Will plan to complete 10 days of Rocephin.   Check HIV    Neck pain  4/1- discussed with primary team, will plan for neck MRI as he has consistent neck pain         Anticipated Disposition:     Thank you for your consult. I will follow-up with patient. Please contact us if you have any additional questions.    Lavell Waldrop MD  Infectious Disease  [facility]    Subjective:     Principal Problem:Acute encephalopathy    HPI: 69-year-old male who was admitted with history of neck pain and fever . Since admission, he was noted to have worsening confusion . CT scan of the head and cervical spine did not show any acute abnormality.  Lab data showed - creatinine of 4.8 and BUN of 4.2. CBC is significant for left shift -with seg of 93.  Family were in the room at this time.  Interval History:   03/29 -he is more awake and alert today ,cultures-pending , wife is in the room at this time.   4/1- he is afebrile, he is complaining of neck pain   Review of Systems   Constitutional: Negative for activity change, appetite change, chills, diaphoresis and fatigue.   HENT: Negative for congestion, dental problem, ear  discharge, ear pain and facial swelling.    Eyes: Negative for pain, discharge and itching.   Respiratory: Negative for apnea, cough, chest tightness and shortness of breath.    Cardiovascular: Negative for chest pain and leg swelling.   Gastrointestinal: Negative for abdominal distention and abdominal pain.   Endocrine: Negative for cold intolerance, heat intolerance and polydipsia.   Genitourinary: Negative for difficulty urinating, dysuria and enuresis.   Musculoskeletal: Negative for arthralgias and back pain.   Skin: Negative for color change and pallor.   Allergic/Immunologic: Negative for environmental allergies and food allergies.   Neurological: Negative for dizziness, facial asymmetry, light-headedness and headaches.   Hematological: Negative for adenopathy. Does not bruise/bleed easily.   Psychiatric/Behavioral: Negative for agitation and behavioral problems.     Objective:     Vital Signs (Most Recent):  Temp: 98 °F (36.7 °C) (04/02/19 0435)  Pulse: 80 (04/02/19 0546)  Resp: 18 (04/02/19 0435)  BP: 118/62 (04/02/19 0500)  SpO2: (!) 92 % (04/02/19 0435) Vital Signs (24h Range):  Temp:  [97.7 °F (36.5 °C)-98 °F (36.7 °C)] 98 °F (36.7 °C)  Pulse:  [66-80] 80  Resp:  [16-20] 18  SpO2:  [92 %-98 %] 92 %  BP: (117-183)/(62-81) 118/62     Weight: 87.1 kg (192 lb)  Body mass index is 25.33 kg/m².    Estimated Creatinine Clearance: 48.6 mL/min (A) (based on SCr of 1.6 mg/dL (H)).    Physical Exam   Constitutional: He appears well-developed and well-nourished.   HENT:   Head: Normocephalic and atraumatic.   Eyes: Pupils are equal, round, and reactive to light. EOM are normal.   Neck: Normal range of motion. No JVD present. Muscular tenderness present. Neck rigidity present.   Cardiovascular: Regular rhythm and intact distal pulses. Tachycardia present.   Murmur heard.   Systolic murmur is present with a grade of 3/6.  Pulmonary/Chest: Effort normal. No respiratory distress. He has no wheezes. He has rhonchi in the  right middle field.   Abdominal: Soft. Bowel sounds are normal.   Musculoskeletal: Normal range of motion. He exhibits no deformity.   Neurological: He is alert. He has normal reflexes.   Skin: Skin is warm and dry. Capillary refill takes 2 to 3 seconds.   Nursing note and vitals reviewed.       Significant Labs:   CBC:   Recent Labs   Lab 04/01/19  0453 04/02/19  0426   WBC 6.70 10.12   HGB 8.7* 9.3*   HCT 27.2* 29.0*    161     All pertinent labs within the past 24 hours have been reviewed.    Significant Imaging: I have reviewed all pertinent imaging results/findings within the past 24 hours.

## 2019-04-02 NOTE — PLAN OF CARE
Problem: Adult Inpatient Plan of Care  Goal: Plan of Care Review  Outcome: Ongoing (interventions implemented as appropriate)  Recommendations     Recommendation: 1. Continue current diet & ONS. 2. Will continue to monitor.   Intervention: coordination of care   Goals: Meet > 85 % EEN/EPN while admitted  Nutrition Goal Status: continues   Communication of RD Recs: (POc, sticky note, reviewed with RN)

## 2019-04-02 NOTE — PLAN OF CARE
Problem: Adult Inpatient Plan of Care  Goal: Plan of Care Review  Outcome: Ongoing (interventions implemented as appropriate)  Plan of care reviewed and discussed with patient.  No acute distress noted.  Safety and fall precautions in place.  Patient free from injury.  Oral hydration and bed mobility promoted.  IV hydration maintained.  Pain managed adequately.  Will continue to monitor.    12 hour chart check complete.

## 2019-04-02 NOTE — PLAN OF CARE
Problem: Adult Inpatient Plan of Care  Goal: Plan of Care Review  Outcome: Ongoing (interventions implemented as appropriate)  No acute events over night. IV ABX/ IVF infusing as ordered. Pain controlled with prn meds. Neuro checks performed, oriented x4. Fall precautions maintained. Aspiration precautions maintained. Cardiac monitor in place, NSR. VSS. Chart reviewed. Will monitor.

## 2019-04-02 NOTE — PT/OT/SLP PROGRESS
Physical Therapy  Treatment    Kodi Ribeiro   MRN: [id number]   Admitting Diagnosis: Acute encephalopathy    PT Received On: 04/02/19  PT Start Time: 1035     PT Stop Time: 1050    PT Total Time (min): 15 min       Billable Minutes:  Therapeutic Activity 15 min    Treatment Type: Treatment  PT/PTA: PT     PTA Visit Number: 0       General Precautions: Standard, fall  Orthopedic Precautions: N/A   Braces: N/A    Spiritual, Cultural Beliefs, Pentecostalism Practices, Values that Affect Care: no    Subjective:  Communicated with Nurse Patel and [facility] chart review prior to session.  Pt found sleeping in bed with daughter in room. Daughter ok'ed with waking pt at this time.    Pain/Comfort  Pain Rating 1: 9/10  Pain Addressed 1: Pre-medicate for activity  Pain Rating Post-Intervention 1: 9/10    Objective:   Patient found with: peripheral IV, telemetry    Therapeutic Activities and Exercises:  Pt and daughter educated in safety with bed mobility and t/fs, and therapeutic exercises. Pt declined to perform any mobility or t/fs, as well as therapeutic exercises secondary to pain and nausea at this time.    AM-PAC 6 CLICK MOBILITY  How much help from another person does this patient currently need?   1 = Unable, Total/Dependent Assistance  2 = A lot, Maximum/Moderate Assistance  3 = A little, Minimum/Contact Guard/Supervision  4 = None, Modified [place]/Independent    Turning over in bed (including adjusting bedclothes, sheets and blankets)?: 4  Sitting down on and standing up from a chair with arms (e.g., wheelchair, bedside commode, etc.): 1  Moving from lying on back to sitting on the side of the bed?: 4  Moving to and from a bed to a chair (including a wheelchair)?: 2  Need to walk in hospital room?: 1  Climbing 3-5 steps with a railing?: 1  Basic Mobility Total Score: 13    AM-PAC Raw Score CMS G-Code Modifier Level of Impairment Assistance   6 % Total / Unable   7 - 9 CM 80 - 100% Maximal Assist   10 - 14 CL  60 - 80% Moderate Assist   15 - 19 CK 40 - 60% Moderate Assist   20 - 22 CJ 20 - 40% Minimal Assist   23 CI 1-20% SBA / CGA   24 CH 0% Independent/ Mod I     Patient left supine with all lines intact, call button in reach, Nurse Amanda notified and daughter present.    Assessment:  Kodi Ribeiro is a 69 y.o. male with a medical diagnosis of Acute encephalopathy and presents with impaired functionalmobility. Pt will benefit from continued skilled PT in order to address impairments.    Rehab identified problem list/impairments: Rehab identified problem list/impairments: weakness, impaired endurance, impaired self care skills, impaired functional mobilty, decreased coordination, impaired balance, decreased upper extremity function, decreased lower extremity function, decreased safety awareness, pain    Rehab potential is good.    Activity tolerance: Fair    Discharge recommendations: Discharge Facility/Level of Care Needs: nursing facility, skilled, home health PT, outpatient PT     Barriers to discharge:      Equipment recommendations:       GOALS:   Multidisciplinary Problems     Physical Therapy Goals        Problem: Physical Therapy Goal    Goal Priority Disciplines Outcome Goal Variances Interventions   Physical Therapy Goal     PT, PT/OT Ongoing (interventions implemented as appropriate)     Description:  LTG'S TO BE MET IN 7 DAYS (4-3-19)  1. PT WILL [facility] FOR BED MOBILITY  2. PT WILL REQUIRE [facility] FOR TF'S  3. PT WILL TOLERATED BLE THEREX X 20 REPS                      PLAN:    Patient to be seen 5 x/week  to address the above listed problems via gait training, therapeutic activities, therapeutic exercises  Plan of Care expires: 04/03/19  Plan of Care reviewed with: patient    PT G-Codes  Functional Assessment Tool Used: [facility]  Score: 13    Bonnie Hawk, PT/OT  04/02/2019

## 2019-04-02 NOTE — ASSESSMENT & PLAN NOTE
Continue Ceftriaxone 2 g every 12 hours for 10 more days.  Ordered MRI C-spine for persistent neck pain but patient refused.

## 2019-04-02 NOTE — PLAN OF CARE
Problem: Physical Therapy Goal  Goal: Physical Therapy Goal  LTG'S TO BE MET IN 7 DAYS (4-3-19)  1. PT WILL [facility] FOR BED MOBILITY  2. PT WILL REQUIRE [facility] FOR TF'S  3. PT WILL TOLERATED BLE THEREX X 20 REPS     Outcome: Ongoing (interventions implemented as appropriate)  On going

## 2019-04-02 NOTE — PT/OT/SLP PROGRESS
Speech Language Pathology Treatment    Patient Name:  Kodi Ribeiro   MRN:  [id number]  Admitting Diagnosis: Acute encephalopathy    Recommendations:                 General Recommendations:  Dysphagia therapy  Diet recommendations:  Mechanical soft, Liquid Diet Level: Thin   Aspiration Precautions: 1 bite/sip at a time, HOB to 90 degrees and Small bites/sips   General Precautions: Standard, fall  Communication strategies:  none    Subjective     Pt cooperative.  Patient goals: none stated     Pain/Comfort:  · Pain Rating 1: 0/10  · Pain Rating Post-Intervention 1: 0/10    Objective:     Has the patient been evaluated by SLP for swallowing?   Yes  Keep patient NPO? No   Current Respiratory Status: room air      Pt completed swallow ex x 15 each with SPV to improve swallow function. Pt took sips of thin liquids via straw without s/s of aspiration. Pt was educated on compensatory strategy of sitting at 90 degrees to reduce risk of aspiration.     Assessment:     Kodi Ribeiro is a 69 y.o. male with an SLP diagnosis of Dysphagia.  He presents with improved swallow function.    Goals:   Multidisciplinary Problems     SLP Goals        Problem: SLP Goal    Goal Priority Disciplines Outcome   SLP Goal     SLP Ongoing (interventions implemented as appropriate)   Description:  1. Ongoing swallow assessment for safety of least restrictive diet.  2. Pt will complete oral motor/pharyngeal/laryngeal ex x 15 each with mod cues to improve swallow function.                     Plan:     · Patient to be seen:  2 x/week   · Plan of Care expires:  04/04/19  · Plan of Care reviewed with:  patient   · SLP Follow-Up:  Yes       Discharge recommendations:  home   Barriers to Discharge:  None    Time Tracking:     SLP Treatment Date:   04/02/19  Speech Start Time:  1123  Speech Stop Time:  1136     Speech Total Time (min):  13 min    Billable Minutes: Treatment Swallowing Dysfunction 13    Alanis Arreguin CCC-SLP  04/02/2019

## 2019-04-02 NOTE — PROGRESS NOTES
"  [facility] -   Adult Nutrition  Progress Note    SUMMARY     Recommendations    Recommendation: 1. Continue current diet & ONS. 2. Will continue to monitor.   Intervention: coordination of care   Goals: Meet > 85 % EEN/EPN while admitted  Nutrition Goal Status: continues   Communication of RD Recs: (POc, sticky note, reviewed with RN)    Reason for Assessment    Reason For Assessment: RD f/u  Dx:  1. Acute renal failure superimposed on chronic kidney disease, unspecified CKD stage, unspecified acute renal failure type    2. Altered mental status    3. Neck pain, bilateral    4. MARCELA (acute kidney injury)    5. Acute encephalopathy    6. Essential hypertension    7. Accelerated hypertension    8. Analgesic nephropathy      Hx: Anemia, Diverticulosis, CAD, GERD, HLD, HTN, Stroke.     General info comments:  3.29.19.Pt now on cardiac pureed diet w/ nectar thick consistency per ST recs. Pt and Pt's family report poor appetite today, PO intake 25 % this am. Pt and Pt's family report no wt loss, good appetite and intake PTA. NFPE not completed d/t pt w/ no s/s of malnutrition.  Pt agreed to try oral supplements.   4.2.19. Pt w/ poor appetite and intake. Reviewed nutrition recs w/ RN this am. ONS ordered per RD recs.  Discharge plan: Cardiac diet w/ pureed consistency      Nutrition Risk Screen    Nutrition Risk Screen: no indicators present    Nutrition/Diet History    Spiritual, Cultural Beliefs, Voodoo Practices, Values that Affect Care: no    Anthropometrics    Temp: 98.4 °F (36.9 °C)  Height Method: Stated  Height: 6' 1" (185.4 cm)  Height (inches): 73 in  Weight Method: Bed Scale  Weight: 87.1 kg (192 lb)  Weight (lb): 192 lb  Ideal Body Weight (IBW), Male: 184 lb  % Ideal Body Weight, Male (lb): 104.35 lb  BMI (Calculated): 25.4  BMI Grade: 25 - 29.9 - overweight       Lab/Procedures/Meds    Pertinent Labs Reviewed: reviewed  BMP  Lab Results   Component Value Date     (L) 04/02/2019    K 3.6 " 04/02/2019     04/02/2019    CO2 22 (L) 04/02/2019    BUN 37 (H) 04/02/2019    CREATININE 1.6 (H) 04/02/2019    CALCIUM 8.2 (L) 04/02/2019    ANIONGAP 8 04/02/2019    ESTGFRAFRICA 50 (A) 04/02/2019    EGFRNONAA 43 (A) 04/02/2019     Lab Results   Component Value Date    CALCIUM 8.2 (L) 04/02/2019    PHOS 3.0 03/27/2019     Lab Results   Component Value Date    ALBUMIN 2.8 (L) 03/27/2019     No results for input(s): POCTGLUCOSE in the last 24 hours.    Pertinent Medications Reviewed: reviewed    Physical Findings/Assessment     skin: wound     Estimated/Assessed Needs    Weight Used For Calorie Calculations: 87.1 kg (192 lb 0.3 oz)  Energy Calorie Requirements (kcal): 2020  Energy Need Method: [place]-St Jeor(x1.2)  Protein Requirements: 87 -104 g   Weight Used For Protein Calculations: 87.1 kg (192 lb 0.3 oz)     Estimated Fluid Requirement Method: RDA Method(or per MD)  RDA Method (mL): 2020         Nutrition Prescription Ordered    Nutrition Order Comments: cardiac pureed diet w/ nectar thick liquids + boost plus all meals     Evaluation of Received Nutrient/Fluid Intake          Intake/Output Summary (Last 24 hours) at 4/2/2019 1005  Last data filed at 4/2/2019 0538  Gross per 24 hour   Intake 1663.33 ml   Output --   Net 1663.33 ml       % Intake of Estimated Energy Needs: 0 - 25 %  % Meal Intake: 25 - 50 %    Nutrition Risk      2xweekly    Assessment and Plan     Nutrition Problem  Inadequate energy intake     Related to (etiology):   Decreased appetite    Signs and Symptoms (as evidenced by):   PO intake 25 % this am    Interventions/Recommendations (treatment strategy):  See above     Nutrition Diagnosis Status:   Continues        Monitor and Evaluation    Food and Nutrient Intake: energy intake  Food and Nutrient Adminstration: diet order  Anthropometric Measurements: weight  Biochemical Data, Medical Tests and Procedures: electrolyte and renal panel, glucose/endocrine profile  Nutrition-Focused  Physical Findings: overall appearance, skin     Nutrition Follow-Up    RD Follow-up?: Yes

## 2019-04-02 NOTE — SUBJECTIVE & OBJECTIVE
Interval History:  Slept better last night.  Renal function improving steadily.  Intermittent upper back and neck pain improve but continues.  No fevers.  Ordered MRI of the neck but patient refused.    Review of Systems   Constitutional: Positive for activity change. Negative for chills, fatigue and fever.   HENT: Negative.  Negative for congestion and sore throat.    Eyes: Negative.  Negative for visual disturbance.   Respiratory: Negative.  Negative for cough, shortness of breath and wheezing.    Cardiovascular: Negative.  Negative for chest pain.   Gastrointestinal: Negative for abdominal pain, diarrhea, nausea and vomiting.   Endocrine: Negative.    Genitourinary: Negative.    Musculoskeletal: Positive for neck pain. Negative for myalgias and neck stiffness.   Skin: Negative.  Negative for color change and pallor.   Allergic/Immunologic: Negative.    Neurological: Positive for weakness.   Hematological: Negative.    Psychiatric/Behavioral: Negative.  Negative for agitation and confusion.   All other systems reviewed and are negative.    Objective:     Vital Signs (Most Recent):  Temp: 97.4 °F (36.3 °C) (04/02/19 1238)  Pulse: 71 (04/02/19 1320)  Resp: 16 (04/02/19 1238)  BP: (!) 109/57 (04/02/19 1238)  SpO2: 95 % (04/02/19 1238) Vital Signs (24h Range):  Temp:  [97.4 °F (36.3 °C)-98.4 °F (36.9 °C)] 97.4 °F (36.3 °C)  Pulse:  [65-80] 71  Resp:  [16-18] 16  SpO2:  [92 %-97 %] 95 %  BP: (109-183)/(57-81) 109/57     Weight: 87.1 kg (192 lb)  Body mass index is 25.33 kg/m².    Intake/Output Summary (Last 24 hours) at 4/2/2019 1406  Last data filed at 4/2/2019 1200  Gross per 24 hour   Intake 2083.33 ml   Output 800 ml   Net 1283.33 ml      Physical Exam   Constitutional: He appears well-developed and well-nourished.   HENT:   Head: Normocephalic and atraumatic.   Eyes: Pupils are equal, round, and reactive to light. EOM are normal.   Neck: Normal range of motion. No JVD present. Muscular tenderness present. Neck  rigidity present.   Cardiovascular: Regular rhythm and intact distal pulses. Tachycardia present.   Murmur heard.   Systolic murmur is present with a grade of 3/6.  Pulmonary/Chest: Effort normal. No respiratory distress. He has no wheezes. He has rhonchi in the right middle field.   Abdominal: Soft. Bowel sounds are normal.   Musculoskeletal: Normal range of motion. He exhibits no deformity.   Neurological: He is alert. He has normal reflexes.   Skin: Skin is warm and dry. Capillary refill takes 2 to 3 seconds.   Nursing note and vitals reviewed.      Significant Labs:   ABGs: No results for input(s): PH, PCO2, HCO3, POCSATURATED, BE, TOTALHB, COHB, METHB, O2HB, POCFIO2 in the last 48 hours.  Blood Culture:   No results for input(s): LABBLOO in the last 48 hours.  BMP:   Recent Labs   Lab 04/02/19 0426   *   *   K 3.6      CO2 22*   BUN 37*   CREATININE 1.6*   CALCIUM 8.2*     CBC:   Recent Labs   Lab 04/01/19 0453 04/02/19 0426   WBC 6.70 10.12   HGB 8.7* 9.3*   HCT 27.2* 29.0*    161     CMP:   Recent Labs   Lab 04/01/19 0453 04/02/19 0426    134*   K 3.1* 3.6    104   CO2 19* 22*   * 144*   BUN 48* 37*   CREATININE 1.7* 1.6*   CALCIUM 8.1* 8.2*   ANIONGAP 9 8   EGFRNONAA 40* 43*     Troponin:   No results for input(s): TROPONINI in the last 48 hours.  Urine Studies:   No results for input(s): COLORU, APPEARANCEUA, PHUR, SPECGRAV, PROTEINUA, GLUCUA, KETONESU, BILIRUBINUA, OCCULTUA, NITRITE, UROBILINOGEN, LEUKOCYTESUR, RBCUA, WBCUA, BACTERIA, SQUAMEPITHEL, HYALINECASTS in the last 48 hours.    Invalid input(s): WRIGHTSUR  All pertinent labs within the past 24 hours have been reviewed.    Significant Imaging: I have reviewed all pertinent imaging results/findings within the past 24 hours.

## 2019-04-02 NOTE — PROGRESS NOTES
[facility] Hospital Medicine  Progress Note    Patient Name: Kodi Ribeiro  MRN: [id number]  Patient Class: IP- Inpatient   Admission Date: 3/27/2019  Length of Stay: 6 days  Attending Physician: Stefan Leary MD  Primary Care Provider: Norma Nuñez MD        Subjective:     Principal Problem:Acute encephalopathy    HPI:   69-year-old male patient with extensive medical history presents today with complaint of neck pain that started over the weekend.  Patient reports he was seen in the emergency room a couple days ago was given pain medication and muscle relaxer and discharged home.  Patient reports that treatment with outpatient medications not effective and continues to complain of this pain.  Range of motion of neck worsens.  Associated symptoms include some confusion and weakness endorsed by patient's wife at bedside.  Additionally patient's wife reports patient has not been taking his regular medicines over the last couple of days because she was worried only about his pain and treating that and forgot about his other medicines.  Patient was evaluated in the emergency room.   Significant labs creatinine of 4.8 and BUN of 4.2.  Per emergency room note  No acute findings on CT of cervical spine and CT head.   Hospital Medicine consult.  Patient placed in observation.    Hospital Course:  Patient admitted to hospital with CC Neck Pain. Patient placed on BS abx. Concern is for meningitis as patient continues with AMS, febrile episodes and c/o Neck Pain 10/10. Family at bedside. ID consulted. Patient covered broadly. Patient unable to obtain LP per IR due to his last receiving plavix on Sunday.  Dr Curtis has agreed to do LP Thursday. CT head negative and MRI unable to obtain due to patient uncooperativity / claustrophobia per family. Patient was responding to query correctly at time of exam. He was able to tell me where he is, the year, and the president. Family reports this is an  improvement from earlier today. POC discussed in detail.  28 March successful lumbar puncture.  Renal function improving steadily.  Intermittent upper back and neck pain.  No fevers.  CSF HSV1/2 and VZV PCR negative - Stopped Acyclovir.  Added Amitriptyline 25 mg at bedtime to help with insomnia and recurrent pain.    Interval History:  Slept better last night.  Renal function improving steadily.  Intermittent upper back and neck pain improve but continues.  No fevers.  Ordered MRI of the neck but patient refused.    Review of Systems   Constitutional: Positive for activity change. Negative for chills, fatigue and fever.   HENT: Negative.  Negative for congestion and sore throat.    Eyes: Negative.  Negative for visual disturbance.   Respiratory: Negative.  Negative for cough, shortness of breath and wheezing.    Cardiovascular: Negative.  Negative for chest pain.   Gastrointestinal: Negative for abdominal pain, diarrhea, nausea and vomiting.   Endocrine: Negative.    Genitourinary: Negative.    Musculoskeletal: Positive for neck pain. Negative for myalgias and neck stiffness.   Skin: Negative.  Negative for color change and pallor.   Allergic/Immunologic: Negative.    Neurological: Positive for weakness.   Hematological: Negative.    Psychiatric/Behavioral: Negative.  Negative for agitation and confusion.   All other systems reviewed and are negative.    Objective:     Vital Signs (Most Recent):  Temp: 97.4 °F (36.3 °C) (04/02/19 1238)  Pulse: 71 (04/02/19 1320)  Resp: 16 (04/02/19 1238)  BP: (!) 109/57 (04/02/19 1238)  SpO2: 95 % (04/02/19 1238) Vital Signs (24h Range):  Temp:  [97.4 °F (36.3 °C)-98.4 °F (36.9 °C)] 97.4 °F (36.3 °C)  Pulse:  [65-80] 71  Resp:  [16-18] 16  SpO2:  [92 %-97 %] 95 %  BP: (109-183)/(57-81) 109/57     Weight: 87.1 kg (192 lb)  Body mass index is 25.33 kg/m².    Intake/Output Summary (Last 24 hours) at 4/2/2019 1406  Last data filed at 4/2/2019 1200  Gross per 24 hour   Intake 2083.33 ml    Output 800 ml   Net 1283.33 ml      Physical Exam   Constitutional: He appears well-developed and well-nourished.   HENT:   Head: Normocephalic and atraumatic.   Eyes: Pupils are equal, round, and reactive to light. EOM are normal.   Neck: Normal range of motion. No JVD present. Muscular tenderness present. Neck rigidity present.   Cardiovascular: Regular rhythm and intact distal pulses. Tachycardia present.   Murmur heard.   Systolic murmur is present with a grade of 3/6.  Pulmonary/Chest: Effort normal. No respiratory distress. He has no wheezes. He has rhonchi in the right middle field.   Abdominal: Soft. Bowel sounds are normal.   Musculoskeletal: Normal range of motion. He exhibits no deformity.   Neurological: He is alert. He has normal reflexes.   Skin: Skin is warm and dry. Capillary refill takes 2 to 3 seconds.   Nursing note and vitals reviewed.      Significant Labs:   ABGs: No results for input(s): PH, PCO2, HCO3, POCSATURATED, BE, TOTALHB, COHB, METHB, O2HB, POCFIO2 in the last 48 hours.  Blood Culture:   No results for input(s): LABBLOO in the last 48 hours.  BMP:   Recent Labs   Lab 04/02/19 0426   *   *   K 3.6      CO2 22*   BUN 37*   CREATININE 1.6*   CALCIUM 8.2*     CBC:   Recent Labs   Lab 04/01/19 0453 04/02/19 0426   WBC 6.70 10.12   HGB 8.7* 9.3*   HCT 27.2* 29.0*    161     CMP:   Recent Labs   Lab 04/01/19 0453 04/02/19 0426    134*   K 3.1* 3.6    104   CO2 19* 22*   * 144*   BUN 48* 37*   CREATININE 1.7* 1.6*   CALCIUM 8.1* 8.2*   ANIONGAP 9 8   EGFRNONAA 40* 43*     Troponin:   No results for input(s): TROPONINI in the last 48 hours.  Urine Studies:   No results for input(s): COLORU, APPEARANCEUA, PHUR, SPECGRAV, PROTEINUA, GLUCUA, KETONESU, BILIRUBINUA, OCCULTUA, NITRITE, UROBILINOGEN, LEUKOCYTESUR, RBCUA, WBCUA, BACTERIA, SQUAMEPITHEL, HYALINECASTS in the last 48 hours.    Invalid input(s): REESER  All pertinent labs within the  past 24 hours have been reviewed.    Significant Imaging: I have reviewed all pertinent imaging results/findings within the past 24 hours.    Assessment/Plan:      * Acute encephalopathy  Related to meds and high blood pressure suspected.  Resume home meds to control blood pressure.  CT of head negative for any acute findings at this time.  UA negative.  Try to avoid narcotics or any sedated or mood altering medications.  Neuro checks.  Consider Neurology consult pending course.  Infectious disease evaluated and following up recommendations.  Successful LP on 28 March with initial analysis indicating meningitis.  Continuing empiric treatment.  Stopped Acyclovir.    Meningitis  Continue Ceftriaxone 2 g every 12 hours for 10 more days.  Ordered MRI C-spine for persistent neck pain but patient refused.    Debility    Pt/ot eval, dietary consult, supportive care, social work consult.     Neck pain    CT of neck negative for any acute findings per virtual read, CT of head negative for any acute findings per virtual read.    No meningeal signs on exam.  No WBC, afebrile.  Infectious process not suspected.    Goal for better control the BP.  Consider ultrasound bilateral carotids and are further imaging pending course.    Correct electrolyte abnormalities and acute on chronic renal failure.    Tylenol   P.r.n.   try to avoid any narcotics for altering medications, avoid NSAIDs   Added Amitriptyline at bedtime.    Hypomagnesemia    Replace.  Monitor.      Acute renal failure superimposed on chronic kidney disease    Patient wife endorses decreased appetite and oral intake at home.  IV bolus given in ER.  Repeat labs this morning.  Continue gentle IV hydration    patient has not been taking his medications at home as prescribed.  Blood pressure elevated.   Nitropaste and assess response, resume home meds  For Bp control.   check chest x-ray and abdominal KUB.  Consider checking ultrasound of kidneys pending course.  Further  evaluation/diagnostics/interventions/consults pending course     3/27  Improving with fluids  Monitor  Cr now 4.2        History of CVA (cerebrovascular accident)    PT/OT eval.  Social work consult.  Supportive care.  CT of head today no acute findings.  Neuro checks monitor.  Further evaluation/diagnostics/interventions/consults pending course         Essential hypertension    Nitropaste to chest wall this time.  Resume home meds.    Patient and wife encouraged to follow plan of care and medication regimens.        VTE Risk Mitigation (From admission, onward)        Ordered     heparin (porcine) injection 5,000 Units  Every 8 hours      03/27/19 0518     Place MOISES hose  Until discontinued      03/27/19 0518     Place sequential compression device  Until discontinued      03/27/19 0518              Stefan Leary MD  Department of Hospital Medicine   [facility]

## 2019-04-03 LAB
ANION GAP SERPL CALC-SCNC: 8 MMOL/L (ref 8–16)
BACTERIA BLD CULT: NORMAL
BACTERIA BLD CULT: NORMAL
BACTERIA CSF CULT: NO GROWTH
BASOPHILS # BLD AUTO: 0 K/UL (ref 0–0.2)
BASOPHILS NFR BLD: 0 % (ref 0–1.9)
BUN SERPL-MCNC: 25 MG/DL (ref 8–23)
CALCIUM SERPL-MCNC: 8 MG/DL (ref 8.7–10.5)
CHLORIDE SERPL-SCNC: 103 MMOL/L (ref 95–110)
CO2 SERPL-SCNC: 23 MMOL/L (ref 23–29)
CREAT SERPL-MCNC: 1.5 MG/DL (ref 0.5–1.4)
DIFFERENTIAL METHOD: ABNORMAL
EOSINOPHIL # BLD AUTO: 0.2 K/UL (ref 0–0.5)
EOSINOPHIL NFR BLD: 1.8 % (ref 0–8)
ERYTHROCYTE [DISTWIDTH] IN BLOOD BY AUTOMATED COUNT: 14.7 % (ref 11.5–14.5)
EST. GFR  (AFRICAN AMERICAN): 54 ML/MIN/1.73 M^2
EST. GFR  (NON AFRICAN AMERICAN): 46 ML/MIN/1.73 M^2
GLUCOSE SERPL-MCNC: 124 MG/DL (ref 70–110)
GRAM STN SPEC: NORMAL
HCT VFR BLD AUTO: 24.7 % (ref 40–54)
HGB BLD-MCNC: 7.9 G/DL (ref 14–18)
HIV 1+2 AB+HIV1 P24 AG SERPL QL IA: NEGATIVE
LYMPHOCYTES # BLD AUTO: 1 K/UL (ref 1–4.8)
LYMPHOCYTES NFR BLD: 10.2 % (ref 18–48)
MCH RBC QN AUTO: 28.2 PG (ref 27–31)
MCHC RBC AUTO-ENTMCNC: 32 G/DL (ref 32–36)
MCV RBC AUTO: 88 FL (ref 82–98)
MONOCYTES # BLD AUTO: 0.6 K/UL (ref 0.3–1)
MONOCYTES NFR BLD: 5.9 % (ref 4–15)
NEUTROPHILS # BLD AUTO: 8.3 K/UL (ref 1.8–7.7)
NEUTROPHILS NFR BLD: 82.1 % (ref 38–73)
PLATELET # BLD AUTO: 153 K/UL (ref 150–350)
PMV BLD AUTO: 10.1 FL (ref 9.2–12.9)
POTASSIUM SERPL-SCNC: 3.3 MMOL/L (ref 3.5–5.1)
RBC # BLD AUTO: 2.8 M/UL (ref 4.6–6.2)
SODIUM SERPL-SCNC: 134 MMOL/L (ref 136–145)
WBC # BLD AUTO: 10.12 K/UL (ref 3.9–12.7)

## 2019-04-03 PROCEDURE — 21400001 HC TELEMETRY ROOM

## 2019-04-03 PROCEDURE — 94640 AIRWAY INHALATION TREATMENT: CPT

## 2019-04-03 PROCEDURE — 36415 COLL VENOUS BLD VENIPUNCTURE: CPT

## 2019-04-03 PROCEDURE — 96372 THER/PROPH/DIAG INJ SC/IM: CPT

## 2019-04-03 PROCEDURE — 25000242 PHARM REV CODE 250 ALT 637 W/ HCPCS: Performed by: NURSE PRACTITIONER

## 2019-04-03 PROCEDURE — 25000003 PHARM REV CODE 250: Performed by: NURSE PRACTITIONER

## 2019-04-03 PROCEDURE — 25000003 PHARM REV CODE 250: Performed by: INTERNAL MEDICINE

## 2019-04-03 PROCEDURE — 94761 N-INVAS EAR/PLS OXIMETRY MLT: CPT

## 2019-04-03 PROCEDURE — 94760 N-INVAS EAR/PLS OXIMETRY 1: CPT

## 2019-04-03 PROCEDURE — 97110 THERAPEUTIC EXERCISES: CPT

## 2019-04-03 PROCEDURE — C1751 CATH, INF, PER/CENT/MIDLINE: HCPCS

## 2019-04-03 PROCEDURE — 80048 BASIC METABOLIC PNL TOTAL CA: CPT

## 2019-04-03 PROCEDURE — 36569 INSJ PICC 5 YR+ W/O IMAGING: CPT

## 2019-04-03 PROCEDURE — 85025 COMPLETE CBC W/AUTO DIFF WBC: CPT

## 2019-04-03 PROCEDURE — 25000003 PHARM REV CODE 250: Performed by: HOSPITALIST

## 2019-04-03 PROCEDURE — 97530 THERAPEUTIC ACTIVITIES: CPT

## 2019-04-03 PROCEDURE — 63600175 PHARM REV CODE 636 W HCPCS: Performed by: INTERNAL MEDICINE

## 2019-04-03 PROCEDURE — 63600175 PHARM REV CODE 636 W HCPCS: Performed by: NURSE PRACTITIONER

## 2019-04-03 RX ORDER — OXYCODONE AND ACETAMINOPHEN 5; 325 MG/1; MG/1
1 TABLET ORAL EVERY 6 HOURS PRN
Qty: 10 TABLET | Refills: 0 | Status: SHIPPED | OUTPATIENT
Start: 2019-04-03 | End: 2019-04-03 | Stop reason: SDUPTHER

## 2019-04-03 RX ORDER — OXYCODONE AND ACETAMINOPHEN 5; 325 MG/1; MG/1
1 TABLET ORAL EVERY 6 HOURS PRN
Qty: 10 TABLET | Refills: 0 | Status: SHIPPED | OUTPATIENT
Start: 2019-04-03 | End: 2020-01-31

## 2019-04-03 RX ADMIN — GUAIFENESIN 600 MG: 600 TABLET, EXTENDED RELEASE ORAL at 09:04

## 2019-04-03 RX ADMIN — IPRATROPIUM BROMIDE AND ALBUTEROL SULFATE 3 ML: .5; 3 SOLUTION RESPIRATORY (INHALATION) at 03:04

## 2019-04-03 RX ADMIN — TRAMADOL HYDROCHLORIDE 50 MG: 50 TABLET, COATED ORAL at 10:04

## 2019-04-03 RX ADMIN — CEFTRIAXONE 2 G: 2 INJECTION, SOLUTION INTRAVENOUS at 04:04

## 2019-04-03 RX ADMIN — SODIUM CHLORIDE, SODIUM LACTATE, POTASSIUM CHLORIDE, AND CALCIUM CHLORIDE: .6; .31; .03; .02 INJECTION, SOLUTION INTRAVENOUS at 09:04

## 2019-04-03 RX ADMIN — HEPARIN SODIUM 5000 UNITS: 5000 INJECTION, SOLUTION INTRAVENOUS; SUBCUTANEOUS at 09:04

## 2019-04-03 RX ADMIN — IPRATROPIUM BROMIDE AND ALBUTEROL SULFATE 3 ML: .5; 3 SOLUTION RESPIRATORY (INHALATION) at 12:04

## 2019-04-03 RX ADMIN — HEPARIN SODIUM 5000 UNITS: 5000 INJECTION, SOLUTION INTRAVENOUS; SUBCUTANEOUS at 02:04

## 2019-04-03 RX ADMIN — HYDRALAZINE HYDROCHLORIDE 10 MG: 10 TABLET, FILM COATED ORAL at 09:04

## 2019-04-03 RX ADMIN — SODIUM BICARBONATE 650 MG TABLET 650 MG: at 08:04

## 2019-04-03 RX ADMIN — TRAMADOL HYDROCHLORIDE 50 MG: 50 TABLET, COATED ORAL at 02:04

## 2019-04-03 RX ADMIN — HYDRALAZINE HYDROCHLORIDE 10 MG: 10 TABLET, FILM COATED ORAL at 06:04

## 2019-04-03 RX ADMIN — AMITRIPTYLINE HYDROCHLORIDE 25 MG: 25 TABLET, FILM COATED ORAL at 09:04

## 2019-04-03 RX ADMIN — TRAMADOL HYDROCHLORIDE 50 MG: 50 TABLET, COATED ORAL at 08:04

## 2019-04-03 RX ADMIN — CEFTRIAXONE 2 G: 2 INJECTION, SOLUTION INTRAVENOUS at 06:04

## 2019-04-03 RX ADMIN — ACETAMINOPHEN 650 MG: 325 TABLET ORAL at 03:04

## 2019-04-03 RX ADMIN — PANTOPRAZOLE SODIUM 40 MG: 40 TABLET, DELAYED RELEASE ORAL at 08:04

## 2019-04-03 RX ADMIN — CARVEDILOL 25 MG: 12.5 TABLET, FILM COATED ORAL at 08:04

## 2019-04-03 RX ADMIN — ACETAMINOPHEN 650 MG: 325 TABLET ORAL at 11:04

## 2019-04-03 RX ADMIN — SODIUM BICARBONATE 650 MG TABLET 650 MG: at 02:04

## 2019-04-03 RX ADMIN — DOCUSATE SODIUM 100 MG: 100 CAPSULE, LIQUID FILLED ORAL at 09:04

## 2019-04-03 RX ADMIN — CARVEDILOL 25 MG: 12.5 TABLET, FILM COATED ORAL at 04:04

## 2019-04-03 RX ADMIN — ACETAMINOPHEN 650 MG: 325 TABLET ORAL at 07:04

## 2019-04-03 RX ADMIN — GUAIFENESIN 600 MG: 600 TABLET, EXTENDED RELEASE ORAL at 08:04

## 2019-04-03 RX ADMIN — IPRATROPIUM BROMIDE AND ALBUTEROL SULFATE 3 ML: .5; 3 SOLUTION RESPIRATORY (INHALATION) at 11:04

## 2019-04-03 RX ADMIN — RAMELTEON 8 MG: 8 TABLET, FILM COATED ORAL at 11:04

## 2019-04-03 RX ADMIN — ISOSORBIDE MONONITRATE 60 MG: 60 TABLET, EXTENDED RELEASE ORAL at 08:04

## 2019-04-03 RX ADMIN — IPRATROPIUM BROMIDE AND ALBUTEROL SULFATE 3 ML: .5; 3 SOLUTION RESPIRATORY (INHALATION) at 08:04

## 2019-04-03 RX ADMIN — SODIUM BICARBONATE 650 MG TABLET 650 MG: at 09:04

## 2019-04-03 RX ADMIN — HYDRALAZINE HYDROCHLORIDE 10 MG: 10 TABLET, FILM COATED ORAL at 02:04

## 2019-04-03 RX ADMIN — AMLODIPINE BESYLATE 10 MG: 10 TABLET ORAL at 08:04

## 2019-04-03 RX ADMIN — HEPARIN SODIUM 5000 UNITS: 5000 INJECTION, SOLUTION INTRAVENOUS; SUBCUTANEOUS at 06:04

## 2019-04-03 NOTE — PROGRESS NOTES
Spoke with Rachael, floor nurse at [facility] @ [phone]. Rachael was told by her admission coordinator, Lesa Mccoy notified her of speaking to CM at [facility] that the facility do not accept pt after 430 pm.

## 2019-04-03 NOTE — ASSESSMENT & PLAN NOTE
3/28- CSF reviewed -consistent with meningitis , this can be bacterial versus viral .    Will continue present antimicrobial therapy but will need cultures to adjust therapy .  Will continue Acyclovir, continue Rocephin, Vancomycin, Ampicillin     3/29- will stop vancomycin in AM, all cultures remain negative till date (was already on antibiotics prior to LP), , will continue Rocephin , vancomycin and acyclovir for now.      4/1- herpes PCR and varicella PCR in CSF -negative.  Will plan to complete 10 days of Rocephin.   Check HIV  4/2- WILL plan to complete therapy with 14 days of therapy - day 7/14

## 2019-04-03 NOTE — PROGRESS NOTES
[facility] Hospital Medicine  Progress Note    Patient Name: Kodi Ribeiro  MRN: [id number]  Patient Class: IP- Inpatient   Admission Date: 3/27/2019  Length of Stay: 7 days  Attending Physician: Stefan Leary MD  Primary Care Provider: Norma Nuñez MD        Subjective:     Principal Problem:Acute encephalopathy    HPI:   69-year-old male patient with extensive medical history presents today with complaint of neck pain that started over the weekend.  Patient reports he was seen in the emergency room a couple days ago was given pain medication and muscle relaxer and discharged home.  Patient reports that treatment with outpatient medications not effective and continues to complain of this pain.  Range of motion of neck worsens.  Associated symptoms include some confusion and weakness endorsed by patient's wife at bedside.  Additionally patient's wife reports patient has not been taking his regular medicines over the last couple of days because she was worried only about his pain and treating that and forgot about his other medicines.  Patient was evaluated in the emergency room.   Significant labs creatinine of 4.8 and BUN of 4.2.  Per emergency room note  No acute findings on CT of cervical spine and CT head.   Hospital Medicine consult.  Patient placed in observation.    Hospital Course:  Patient admitted to hospital with CC Neck Pain. Patient placed on BS abx. Concern is for meningitis as patient continues with AMS, febrile episodes and c/o Neck Pain 10/10. Family at bedside. ID consulted. Patient covered broadly. Patient unable to obtain LP per IR due to his last receiving plavix on Sunday.  Dr Curtis has agreed to do LP Thursday. CT head negative and MRI unable to obtain due to patient uncooperativity / claustrophobia per family. Patient was responding to query correctly at time of exam. He was able to tell me where he is, the year, and the president. Family reports this is an  improvement from earlier today. POC discussed in detail.  28 March successful lumbar puncture.  Renal function improving steadily.  Intermittent upper back and neck pain.  No fevers.  CSF HSV1/2 and VZV PCR negative - Stopped Acyclovir.  Added Amitriptyline 25 mg at bedtime to help with insomnia and recurrent pain.  Hgb 7.9 no transfusion.    No new subjective & objective note has been filed under this hospital service since the last note was generated.    Assessment/Plan:      * Acute encephalopathy  Related to meds and high blood pressure suspected.  Resume home meds to control blood pressure.  CT of head negative for any acute findings at this time.  UA negative.  Try to avoid narcotics or any sedated or mood altering medications.  Neuro checks.  Consider Neurology consult pending course.  Infectious disease evaluated and following up recommendations.  Successful LP on 28 March with initial analysis indicating meningitis.  Continuing empiric treatment.  Stopped Acyclovir.    Meningitis  Continue Ceftriaxone 2 g every 12 hours for 10 more days.  Ordered MRI C-spine for persistent neck pain but patient refused.    Anemia  Chronic anemia.  Ongoing evaluation by Hematology.  Hgb 7.9 with no signs of blood loss nor symptoms.  No Transfusion.    Debility    Pt/ot eval, dietary consult, supportive care, social work consult.     Neck pain    CT of neck negative for any acute findings per virtual read, CT of head negative for any acute findings per virtual read.    No meningeal signs on exam.  No WBC, afebrile.  Infectious process not suspected.    Goal for better control the BP.  Consider ultrasound bilateral carotids and are further imaging pending course.    Correct electrolyte abnormalities and acute on chronic renal failure.    Tylenol   P.r.n.   try to avoid any narcotics for altering medications, avoid NSAIDs   Added Amitriptyline at bedtime.    Hypomagnesemia    Replace.  Monitor.      Acute renal failure  superimposed on chronic kidney disease    Patient wife endorses decreased appetite and oral intake at home.  IV bolus given in ER.  Repeat labs this morning.  Continue gentle IV hydration    patient has not been taking his medications at home as prescribed.  Blood pressure elevated.   Nitropaste and assess response, resume home meds  For Bp control.   check chest x-ray and abdominal KUB.  Consider checking ultrasound of kidneys pending course.  Further evaluation/diagnostics/interventions/consults pending course     3/27  Improving with fluids  Monitor  Cr now 4.2        History of CVA (cerebrovascular accident)    PT/OT eval.  Social work consult.  Supportive care.  CT of head today no acute findings.  Neuro checks monitor.  Further evaluation/diagnostics/interventions/consults pending course         Essential hypertension    Nitropaste to chest wall this time.  Resume home meds.    Patient and wife encouraged to follow plan of care and medication regimens.        VTE Risk Mitigation (From admission, onward)        Ordered     heparin (porcine) injection 5,000 Units  Every 8 hours      03/27/19 0518     Place MOISES hose  Until discontinued      03/27/19 0518     Place sequential compression device  Until discontinued      03/27/19 0518              Stefan Leary MD  Department of Hospital Medicine   [facility]

## 2019-04-03 NOTE — ASSESSMENT & PLAN NOTE
Chronic anemia.  Ongoing evaluation by Hematology.  Hgb 7.9 with no signs of blood loss nor symptoms.  No Transfusion.

## 2019-04-03 NOTE — PROGRESS NOTES
[facility]  Infectious Disease  Progress Note    Patient Name: Kodi Ribeiro  MRN: [id number]  Admission Date: 3/27/2019  Length of Stay: 7 days  Attending Physician: Stefan Leary MD  Primary Care Provider: Norma Nuñez MD    Isolation Status: No active isolations  Assessment/Plan:      Meningitis  3/28- CSF reviewed -consistent with meningitis , this can be bacterial versus viral .    Will continue present antimicrobial therapy but will need cultures to adjust therapy .  Will continue Acyclovir, continue Rocephin, Vancomycin, Ampicillin     3/29- will stop vancomycin in AM, all cultures remain negative till date (was already on antibiotics prior to LP), , will continue Rocephin , vancomycin and acyclovir for now.      4/1- herpes PCR and varicella PCR in CSF -negative.  Will plan to complete 10 days of Rocephin.   Check HIV  4/2- WILL plan to complete therapy with 14 days of therapy - day 7/14     Essential hypertension  BP control as per primary team        Anticipated Disposition:     Thank you for your consult. I will follow-up with patient. Please contact us if you have any additional questions.    Lavell Waldrop MD  Infectious Disease  [facility]    Subjective:     Principal Problem:Acute encephalopathy    HPI: 69-year-old male who was admitted with history of neck pain and fever . Since admission, he was noted to have worsening confusion . CT scan of the head and cervical spine did not show any acute abnormality.  Lab data showed - creatinine of 4.8 and BUN of 4.2. CBC is significant for left shift -with seg of 93.  Family were in the room at this time.  Interval History:  69 year old man with meningitis .  All cultures remain negative.  He says he cannot have cervical  MRI due to claustrophobia   Review of Systems   Constitutional: Negative.  Negative for activity change, appetite change, chills, diaphoresis and fatigue.   HENT: Negative for congestion, dental  problem, ear discharge, ear pain and facial swelling.    Eyes: Negative.  Negative for pain, discharge and itching.   Respiratory: Negative for apnea, cough, chest tightness and shortness of breath.    Cardiovascular: Negative for chest pain and leg swelling.   Gastrointestinal: Negative for abdominal distention and abdominal pain.   Endocrine: Negative for cold intolerance, heat intolerance and polydipsia.   Genitourinary: Negative for difficulty urinating, dysuria and enuresis.   Musculoskeletal: Negative for arthralgias and back pain.   Skin: Negative for color change and pallor.   Allergic/Immunologic: Negative for environmental allergies and food allergies.   Neurological: Negative for dizziness, facial asymmetry, light-headedness and headaches.   Hematological: Negative for adenopathy. Does not bruise/bleed easily.   Psychiatric/Behavioral: Negative for agitation and behavioral problems.     Objective:     Vital Signs (Most Recent):  Temp: 99.8 °F (37.7 °C) (04/03/19 0714)  Pulse: 74 (04/03/19 0806)  Resp: 20 (04/03/19 0806)  BP: 127/70 (04/03/19 0714)  SpO2: (!) 94 % (04/03/19 0806) Vital Signs (24h Range):  Temp:  [97.4 °F (36.3 °C)-99.8 °F (37.7 °C)] 99.8 °F (37.7 °C)  Pulse:  [63-74] 74  Resp:  [14-20] 20  SpO2:  [90 %-99 %] 94 %  BP: (109-173)/(57-76) 127/70     Weight: 87.1 kg (192 lb)  Body mass index is 25.33 kg/m².    Estimated Creatinine Clearance: 51.8 mL/min (A) (based on SCr of 1.5 mg/dL (H)).    Physical Exam   Constitutional: He appears well-developed and well-nourished.   HENT:   Head: Normocephalic and atraumatic.   Eyes: Pupils are equal, round, and reactive to light. EOM are normal.   Neck: Normal range of motion. No JVD present. Muscular tenderness present. Neck rigidity present.   Cardiovascular: Regular rhythm and intact distal pulses. Tachycardia present.   Murmur heard.   Systolic murmur is present with a grade of 3/6.  Pulmonary/Chest: Effort normal. No respiratory distress. He has no  wheezes. He has rhonchi in the right middle field.   Abdominal: Soft. Bowel sounds are normal.   Musculoskeletal: Normal range of motion. He exhibits no deformity.   Neurological: He is alert. He has normal reflexes.   Skin: Skin is warm and dry. Capillary refill takes 2 to 3 seconds.   Nursing note and vitals reviewed.      Significant Labs:   Blood Culture:   Recent Labs   Lab 03/29/19  1058 03/29/19  1101   LABBLOO No Growth to date  No Growth to date  No Growth to date  No Growth to date  No Growth to date No Growth to date  No Growth to date  No Growth to date  No Growth to date  No Growth to date     BMP:   Recent Labs   Lab 04/03/19  0516   *   *   K 3.3*      CO2 23   BUN 25*   CREATININE 1.5*   CALCIUM 8.0*     CMP:   Recent Labs   Lab 04/02/19  0426 04/03/19  0516   * 134*   K 3.6 3.3*    103   CO2 22* 23   * 124*   BUN 37* 25*   CREATININE 1.6* 1.5*   CALCIUM 8.2* 8.0*   ANIONGAP 8 8   EGFRNONAA 43* 46*     All pertinent labs within the past 24 hours have been reviewed.    Significant Imaging: I have reviewed all pertinent imaging results/findings within the past 24 hours.

## 2019-04-03 NOTE — SUBJECTIVE & OBJECTIVE
Interval History:  69 year old man with meningitis .  All cultures remain negative.  He says he cannot have cervical  MRI due to claustrophobia   Review of Systems   Constitutional: Negative.  Negative for activity change, appetite change, chills, diaphoresis and fatigue.   HENT: Negative for congestion, dental problem, ear discharge, ear pain and facial swelling.    Eyes: Negative.  Negative for pain, discharge and itching.   Respiratory: Negative for apnea, cough, chest tightness and shortness of breath.    Cardiovascular: Negative for chest pain and leg swelling.   Gastrointestinal: Negative for abdominal distention and abdominal pain.   Endocrine: Negative for cold intolerance, heat intolerance and polydipsia.   Genitourinary: Negative for difficulty urinating, dysuria and enuresis.   Musculoskeletal: Negative for arthralgias and back pain.   Skin: Negative for color change and pallor.   Allergic/Immunologic: Negative for environmental allergies and food allergies.   Neurological: Negative for dizziness, facial asymmetry, light-headedness and headaches.   Hematological: Negative for adenopathy. Does not bruise/bleed easily.   Psychiatric/Behavioral: Negative for agitation and behavioral problems.     Objective:     Vital Signs (Most Recent):  Temp: 99.8 °F (37.7 °C) (04/03/19 0714)  Pulse: 74 (04/03/19 0806)  Resp: 20 (04/03/19 0806)  BP: 127/70 (04/03/19 0714)  SpO2: (!) 94 % (04/03/19 0806) Vital Signs (24h Range):  Temp:  [97.4 °F (36.3 °C)-99.8 °F (37.7 °C)] 99.8 °F (37.7 °C)  Pulse:  [63-74] 74  Resp:  [14-20] 20  SpO2:  [90 %-99 %] 94 %  BP: (109-173)/(57-76) 127/70     Weight: 87.1 kg (192 lb)  Body mass index is 25.33 kg/m².    Estimated Creatinine Clearance: 51.8 mL/min (A) (based on SCr of 1.5 mg/dL (H)).    Physical Exam   Constitutional: He appears well-developed and well-nourished.   HENT:   Head: Normocephalic and atraumatic.   Eyes: Pupils are equal, round, and reactive to light. EOM are normal.    Neck: Normal range of motion. No JVD present. Muscular tenderness present. Neck rigidity present.   Cardiovascular: Regular rhythm and intact distal pulses. Tachycardia present.   Murmur heard.   Systolic murmur is present with a grade of 3/6.  Pulmonary/Chest: Effort normal. No respiratory distress. He has no wheezes. He has rhonchi in the right middle field.   Abdominal: Soft. Bowel sounds are normal.   Musculoskeletal: Normal range of motion. He exhibits no deformity.   Neurological: He is alert. He has normal reflexes.   Skin: Skin is warm and dry. Capillary refill takes 2 to 3 seconds.   Nursing note and vitals reviewed.      Significant Labs:   Blood Culture:   Recent Labs   Lab 03/29/19  1058 03/29/19  1101   LABBLOO No Growth to date  No Growth to date  No Growth to date  No Growth to date  No Growth to date No Growth to date  No Growth to date  No Growth to date  No Growth to date  No Growth to date     BMP:   Recent Labs   Lab 04/03/19  0516   *   *   K 3.3*      CO2 23   BUN 25*   CREATININE 1.5*   CALCIUM 8.0*     CMP:   Recent Labs   Lab 04/02/19  0426 04/03/19  0516   * 134*   K 3.6 3.3*    103   CO2 22* 23   * 124*   BUN 37* 25*   CREATININE 1.6* 1.5*   CALCIUM 8.2* 8.0*   ANIONGAP 8 8   EGFRNONAA 43* 46*     All pertinent labs within the past 24 hours have been reviewed.    Significant Imaging: I have reviewed all pertinent imaging results/findings within the past 24 hours.

## 2019-04-03 NOTE — PLAN OF CARE
04/03/19 1330   Medicare Message   Important Message from Medicare regarding Discharge Appeal Rights Given to patient/caregiver;Signed/date by patient/caregiver;Explained to patient/caregiver   Date IMM was signed 04/03/19   Time IMM was signed [id number]

## 2019-04-03 NOTE — PLAN OF CARE
Problem: Adult Inpatient Plan of Care  Goal: Plan of Care Review  Outcome: Ongoing (interventions implemented as appropriate)  IV fluids. Up with assistance. Right BKA. Cardiac monitoring. Free from injury. Pain controlled. No distress noted. Chart check completed.

## 2019-04-03 NOTE — PLAN OF CARE
Problem: Adult Inpatient Plan of Care  Goal: Plan of Care Review  Outcome: Ongoing (interventions implemented as appropriate)  .

## 2019-04-03 NOTE — PROCEDURES
"Kodi Ribeiro is a 69 y.o. male patient.    Temp: 98.6 °F (37 °C) (04/03/19 1615)  Pulse: 74 (04/03/19 1617)  Resp: 18 (04/03/19 1615)  BP: (!) 108/58 (04/03/19 1617)  SpO2: (!) 92 % (04/03/19 1615)  Weight: 87.1 kg (192 lb) (03/27/19 0235)  Height: 6' 1" (185.4 cm) (03/27/19 0235)    PICC  Date/Time: 4/3/2019 6:00 PM  Location procedure was performed: [facility] PICC LINE PLACEMENT  Performed by: Eliane Dobson RN  Consent Done: Yes  Time out: Immediately prior to procedure a time out was called to verify the correct patient, procedure, equipment, support staff and site/side marked as required  Indications: med administration  Anesthesia: local infiltration  Local anesthetic: lidocaine 1% without epinephrine  Anesthetic Total (mL): 5  Preparation: skin prepped with ChloraPrep  Skin prep agent dried: skin prep agent completely dried prior to procedure  Sterile barriers: all five maximum sterile barriers used - cap, mask, sterile gown, sterile gloves, and large sterile sheet  Hand hygiene: hand hygiene performed prior to central venous catheter insertion  Location details: left basilic  Catheter type: double lumen  Catheter size: 5 Fr  Catheter Length: 42cm    Ultrasound guidance: yes  Vessel Caliber: large and patent, compressibility normal  Needle advanced into vessel with real time Ultrasound guidance.  Guidewire confirmed in vessel.  Sterile sheath used.  no esophageal manometryNumber of attempts: 1  Post-procedure: blood return through all ports, chlorhexidine patch and sterile dressing applied  Estimated blood loss (mL): 0    Assessment: placement verified by x-ray and successful placement  Complications: none          Eliane Dobson  4/3/2019  "

## 2019-04-03 NOTE — PT/OT/SLP PROGRESS
Physical Therapy  Treatment    Kodi Ribeiro   MRN: [id number]   Admitting Diagnosis: Acute encephalopathy    PT Received On: 04/03/19  PT Start Time: 0911     PT Stop Time: 0940    PT Total Time (min): 29 min       Billable Minutes:  Therapeutic Activity 19 and Therapeutic Exercise 10    Treatment Type: Treatment  PT/PTA: PT             General Precautions: Standard, fall  Orthopedic Precautions: N/A   Braces: N/A         Subjective:  Communicated with NURSE GARCIA AND Epic CHART REVIEW prior to session.   PT AGREED TO TX     Pain/Comfort  Pain Rating 1: 4/10  Location 1: neck  Pain Rating Post-Intervention 1: 6/10    Objective:   Patient found with: telemetry, peripheral IV    Functional Mobility:  PT MET IN RM SUP>SIT EOB WITH S. PT COMPLETED SEATED MIP AND TKE X 20 REPS. PT SCOOTED IN BED X 4 REPS RIGHT AND LEFT. PT COMPLETED UE SHOULDER, AND ELBOW FLEX AND EXT.  PT SUP IN BED TO COMPLETE HIP ADD/ABD. PT RETURNED SEATED EOB AND SQUAT PIVOT T/F TO BSC. PT LEFT SEATED FOR TOILETING WITH WIFE PRESENT AND NURSE AWARE TO ASSIST BACK TO BED.     AM-PAC 6 CLICK MOBILITY  How much help from another person does this patient currently need?   1 = Unable, Total/Dependent Assistance  2 = A lot, Maximum/Moderate Assistance  3 = A little, Minimum/Contact Guard/Supervision  4 = None, Modified Traill/Independent    Turning over in bed (including adjusting bedclothes, sheets and blankets)?: 4  Sitting down on and standing up from a chair with arms (e.g., wheelchair, bedside commode, etc.): 1  Moving from lying on back to sitting on the side of the bed?: 4  Moving to and from a bed to a chair (including a wheelchair)?: 2  Need to walk in hospital room?: 1  Climbing 3-5 steps with a railing?: 1  Basic Mobility Total Score: 13    AM-PAC Raw Score CMS G-Code Modifier Level of Impairment Assistance   6 % Total / Unable   7 - 9 CM 80 - 100% Maximal Assist   10 - 14 CL 60 - 80% Moderate Assist   15 - 19 CK 40 - 60%  Moderate Assist   20 - 22 CJ 20 - 40% Minimal Assist   23 CI 1-20% SBA / CGA   24 CH 0% Independent/ Mod I     Patient left ON BSC with call button in reach and WIFE present.    Assessment:  PT YANETH TX WELL.     Rehab identified problem list/impairments: Rehab identified problem list/impairments: weakness, gait instability, decreased upper extremity function, decreased lower extremity function, impaired balance, impaired endurance, impaired functional mobilty, impaired self care skills, pain, decreased safety awareness    Rehab potential is good.    Activity tolerance: Fair    Discharge recommendations: Discharge Facility/Level of Care Needs: nursing facility, skilled     Barriers to discharge:      Equipment recommendations:       GOALS:   Multidisciplinary Problems     Physical Therapy Goals        Problem: Physical Therapy Goal    Goal Priority Disciplines Outcome Goal Variances Interventions   Physical Therapy Goal     PT, PT/OT Ongoing (interventions implemented as appropriate)     Description:  LTG'S TO BE MET IN 7 DAYS (4-3-19)  1. PT WILL [facility] FOR BED MOBILITY  2. PT WILL REQUIRE [facility] FOR TF'S  3. PT WILL TOLERATED BLE THEREX X 20 REPS                      PLAN:    Patient to be seen 5 x/week  to address the above listed problems via gait training, therapeutic activities, therapeutic exercises, wheelchair management/training  Plan of Care expires: 04/10/19  Plan of Care reviewed with: patient, spouse         Lauren Doss, PT  04/03/2019

## 2019-04-03 NOTE — NURSING
Pt sitting up in bed AAOx3, spouse at bedside. Bedside shift report and skin assessment completed with Milady SINGLETON. No new skin injuries/impairments.

## 2019-04-03 NOTE — PLAN OF CARE
Discharge paperwork faxed to [facility] via [employer]. Still waiting for picc to be placed and insurance auth.       04/03/19 1510   Post-Acute Status   Post-Acute Authorization Placement   Post-Acute Placement Status Pending Payor Review         Still waiting on Picc to be placed. Requested pm does of rocephin be given. Once picc placed , nursing will contact [facility] @ [phone], if not too late they may accept this evening. Acceptance depends upon when picc line is placed and xray verifies placement. Update to Merlene wilson nurse and to patient.

## 2019-04-03 NOTE — PLAN OF CARE
Problem: Skin Injury Risk Increased  Goal: Skin Health and Integrity  Outcome: Ongoing (interventions implemented as appropriate)  Intervention: Promote and Optimize Oral Intake  Pt refused q2hr turn.

## 2019-04-03 NOTE — PLAN OF CARE
Problem: Physical Therapy Goal  Goal: Physical Therapy Goal  LTG'S TO BE MET IN 7 DAYS (4-3-19)  1. PT WILL [facility] FOR BED MOBILITY  2. PT WILL REQUIRE [facility] FOR TF'S  3. PT WILL TOLERATED BLE THEREX X 20 REPS     Outcome: Ongoing (interventions implemented as appropriate)  PT T/F TO BSC WITH MOD A

## 2019-04-04 VITALS
BODY MASS INDEX: 25.45 KG/M2 | HEART RATE: 78 BPM | WEIGHT: 192 LBS | TEMPERATURE: 98 F | HEIGHT: 73 IN | DIASTOLIC BLOOD PRESSURE: 78 MMHG | RESPIRATION RATE: 16 BRPM | OXYGEN SATURATION: 92 % | SYSTOLIC BLOOD PRESSURE: 165 MMHG

## 2019-04-04 LAB
ANION GAP SERPL CALC-SCNC: 9 MMOL/L (ref 8–16)
BASOPHILS # BLD AUTO: 0 K/UL (ref 0–0.2)
BASOPHILS NFR BLD: 0 % (ref 0–1.9)
BUN SERPL-MCNC: 24 MG/DL (ref 8–23)
CALCIUM SERPL-MCNC: 8.3 MG/DL (ref 8.7–10.5)
CHLORIDE SERPL-SCNC: 102 MMOL/L (ref 95–110)
CO2 SERPL-SCNC: 22 MMOL/L (ref 23–29)
CREAT SERPL-MCNC: 1.5 MG/DL (ref 0.5–1.4)
DIFFERENTIAL METHOD: ABNORMAL
EOSINOPHIL # BLD AUTO: 0.3 K/UL (ref 0–0.5)
EOSINOPHIL NFR BLD: 2.4 % (ref 0–8)
ERYTHROCYTE [DISTWIDTH] IN BLOOD BY AUTOMATED COUNT: 15.1 % (ref 11.5–14.5)
EST. GFR  (AFRICAN AMERICAN): 54 ML/MIN/1.73 M^2
EST. GFR  (NON AFRICAN AMERICAN): 46 ML/MIN/1.73 M^2
GLUCOSE SERPL-MCNC: 96 MG/DL (ref 70–110)
HCT VFR BLD AUTO: 25.7 % (ref 40–54)
HGB BLD-MCNC: 8.4 G/DL (ref 14–18)
LYMPHOCYTES # BLD AUTO: 0.9 K/UL (ref 1–4.8)
LYMPHOCYTES NFR BLD: 6.3 % (ref 18–48)
MCH RBC QN AUTO: 29 PG (ref 27–31)
MCHC RBC AUTO-ENTMCNC: 32.7 G/DL (ref 32–36)
MCV RBC AUTO: 89 FL (ref 82–98)
MONOCYTES # BLD AUTO: 0.7 K/UL (ref 0.3–1)
MONOCYTES NFR BLD: 5.1 % (ref 4–15)
NEUTROPHILS # BLD AUTO: 11.8 K/UL (ref 1.8–7.7)
NEUTROPHILS NFR BLD: 86.2 % (ref 38–73)
PLATELET # BLD AUTO: 168 K/UL (ref 150–350)
PMV BLD AUTO: 10.3 FL (ref 9.2–12.9)
POTASSIUM SERPL-SCNC: 3.2 MMOL/L (ref 3.5–5.1)
RBC # BLD AUTO: 2.9 M/UL (ref 4.6–6.2)
SODIUM SERPL-SCNC: 133 MMOL/L (ref 136–145)
WBC # BLD AUTO: 13.66 K/UL (ref 3.9–12.7)

## 2019-04-04 PROCEDURE — 25000003 PHARM REV CODE 250: Performed by: INTERNAL MEDICINE

## 2019-04-04 PROCEDURE — 63600175 PHARM REV CODE 636 W HCPCS: Performed by: NURSE PRACTITIONER

## 2019-04-04 PROCEDURE — 63600175 PHARM REV CODE 636 W HCPCS: Performed by: INTERNAL MEDICINE

## 2019-04-04 PROCEDURE — 25000242 PHARM REV CODE 250 ALT 637 W/ HCPCS: Performed by: NURSE PRACTITIONER

## 2019-04-04 PROCEDURE — 94640 AIRWAY INHALATION TREATMENT: CPT

## 2019-04-04 PROCEDURE — 85025 COMPLETE CBC W/AUTO DIFF WBC: CPT

## 2019-04-04 PROCEDURE — 25000003 PHARM REV CODE 250: Performed by: NURSE PRACTITIONER

## 2019-04-04 PROCEDURE — 80048 BASIC METABOLIC PNL TOTAL CA: CPT

## 2019-04-04 RX ORDER — HYDRALAZINE HYDROCHLORIDE 25 MG/1
25 TABLET, FILM COATED ORAL EVERY 8 HOURS
Status: DISCONTINUED | OUTPATIENT
Start: 2019-04-04 | End: 2019-04-04 | Stop reason: HOSPADM

## 2019-04-04 RX ADMIN — CARVEDILOL 25 MG: 12.5 TABLET, FILM COATED ORAL at 07:04

## 2019-04-04 RX ADMIN — CEFTRIAXONE 2 G: 2 INJECTION, SOLUTION INTRAVENOUS at 05:04

## 2019-04-04 RX ADMIN — HEPARIN SODIUM 5000 UNITS: 5000 INJECTION, SOLUTION INTRAVENOUS; SUBCUTANEOUS at 05:04

## 2019-04-04 RX ADMIN — IPRATROPIUM BROMIDE AND ALBUTEROL SULFATE 3 ML: .5; 3 SOLUTION RESPIRATORY (INHALATION) at 07:04

## 2019-04-04 RX ADMIN — ACETAMINOPHEN 650 MG: 325 TABLET ORAL at 04:04

## 2019-04-04 RX ADMIN — TRAMADOL HYDROCHLORIDE 50 MG: 50 TABLET, COATED ORAL at 07:04

## 2019-04-04 RX ADMIN — HYDRALAZINE HYDROCHLORIDE 10 MG: 10 TABLET, FILM COATED ORAL at 04:04

## 2019-04-04 RX ADMIN — HYDRALAZINE HYDROCHLORIDE 25 MG: 25 TABLET, FILM COATED ORAL at 08:04

## 2019-04-04 RX ADMIN — GUAIFENESIN 600 MG: 600 TABLET, EXTENDED RELEASE ORAL at 08:04

## 2019-04-04 RX ADMIN — ISOSORBIDE MONONITRATE 60 MG: 60 TABLET, EXTENDED RELEASE ORAL at 08:04

## 2019-04-04 RX ADMIN — SODIUM BICARBONATE 650 MG TABLET 650 MG: at 08:04

## 2019-04-04 RX ADMIN — SODIUM CHLORIDE, SODIUM LACTATE, POTASSIUM CHLORIDE, AND CALCIUM CHLORIDE: .6; .31; .03; .02 INJECTION, SOLUTION INTRAVENOUS at 04:04

## 2019-04-04 RX ADMIN — PANTOPRAZOLE SODIUM 40 MG: 40 TABLET, DELAYED RELEASE ORAL at 08:04

## 2019-04-04 RX ADMIN — AMLODIPINE BESYLATE 10 MG: 10 TABLET ORAL at 08:04

## 2019-04-04 NOTE — PLAN OF CARE
04/04/19 1550   Final Note   Assessment Type Final Discharge Note   Anticipated Discharge Disposition SNF   Right Care Referral Info   Post Acute Recommendation SNF / Sub-Acute Rehab   Facility Name [facility]

## 2019-04-04 NOTE — DISCHARGE SUMMARY
[facility] Hospital Medicine  Discharge Summary      Patient Name: Kodi Ribeiro  MRN: [id number]  Admission Date: 3/27/2019  Hospital Length of Stay: 8 days  Discharge Date and Time: 4/4/2019 10:04 AM  Attending Physician:  Stefan Leary MD  Discharging Provider: Stefan Leary MD  Primary Care Provider: Norma Nuñez MD      HPI:    69-year-old male patient with extensive medical history presents today with complaint of neck pain that started over the weekend.  Patient reports he was seen in the emergency room a couple days ago was given pain medication and muscle relaxer and discharged home.  Patient reports that treatment with outpatient medications not effective and continues to complain of this pain.  Range of motion of neck worsens.  Associated symptoms include some confusion and weakness endorsed by patient's wife at bedside.  Additionally patient's wife reports patient has not been taking his regular medicines over the last couple of days because she was worried only about his pain and treating that and forgot about his other medicines.  Patient was evaluated in the emergency room.   Significant labs creatinine of 4.8 and BUN of 4.2.  Per emergency room note  No acute findings on CT of cervical spine and CT head.   Hospital Medicine consult.  Patient placed in observation.    * No surgery found *      Hospital Course:   Patient admitted to hospital with CC Neck Pain. Patient placed on BS abx. Concern is for meningitis as patient continues with AMS, febrile episodes and c/o Neck Pain 10/10. Family at bedside. ID consulted. Patient covered broadly. Patient unable to obtain LP per IR due to his last receiving plavix on Sunday.  Dr Curtis has agreed to do LP Thursday. CT head negative and MRI unable to obtain due to patient uncooperativity / claustrophobia per family. Patient was responding to query correctly at time of exam. He was able to tell me where he is, the year, and the  president. Family reports this is an improvement from earlier today. POC discussed in detail.  28 March successful lumbar puncture.  Renal function improving steadily.  Intermittent upper back and neck pain.  No fevers.  CSF HSV1/2 and VZV PCR negative - Stopped Acyclovir.  Added Amitriptyline 25 mg at bedtime to help with insomnia and recurrent pain.  Hgb 7.9 no transfusion.  PICC line placed.  Discharge plan to transfer to [facility] and complete 10 additional days of Ceftriaxone.  Daily PT and OT.     Consults:   Consults (From admission, onward)        Status Ordering Provider     Inpatient consult to Hospitalist  Once     Provider:  Arabella Garcia MD    Acknowledged SABINO HERNANDEZ     Inpatient consult to Infectious Diseases  Once     Provider:  Lavell Waldrop MD    Acknowledged HILL MARSHALL     Inpatient consult to Nephrology  Once     Provider:  Shirley Bella MD    Completed BHUMI RINCON     Inpatient consult to Registered Dietitian/Nutritionist  Once     Provider:  (Not yet assigned)    Completed BHUMI RINCON     Inpatient consult to Social Work  Once     Provider:  (Not yet assigned)    Completed BHUMI RINCON          Acute renal failure superimposed on chronic kidney disease    Patient wife endorses decreased appetite and oral intake at home.  IV bolus given in ER.  Repeat labs this morning.  Continue gentle IV hydration    patient has not been taking his medications at home as prescribed.  Blood pressure elevated.   Nitropaste and assess response, resume home meds  For Bp control.   check chest x-ray and abdominal KUB.  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    Specialty:  Infectious Diseases  Why:  Hospital follow up meningitis  Contact information:  [address]  [facility] [address] [phone]             [facility].    Specialties:  Nursing Home Agency, SNF Agency  Why:  SNF  Contact information:  [address]  [facility] [id number] [phone]                 Patient Instructions:      Diet Cardiac     Diet diabetic     Activity as tolerated       Significant Diagnostic Studies: Labs: All labs within the past 24 hours have been reviewed    Pending Diagnostic Studies:     Procedure Component Value Units Date/Time    FL PICC Line Placement w/o Port w/ Img > 6 Y/O [[id number]]     Order Status:  Sent Lab Status:  No result          Medications:  Reconciled Home Medications:      Medication List      START taking these medications    cefTRIAXone 2 g in dextrose 5 % 50 mL 2 g/50 mL Pgbk IVPB  Commonly known as:  ROCEPHIN  Inject 50 mLs (2 g total) into the vein every 12 (twelve) hours. Stop Date:  13 April 2019 for 10 days     oxyCODONE-acetaminophen 5-325 mg per tablet  Commonly known as:  PERCOCET  Take 1 tablet by mouth every 6 (six) hours as needed for Pain.        CONTINUE taking these medications    amLODIPine 10 MG tablet  Commonly known as:  NORVASC  TAKE 1 TABLET EVERY DAY     aspirin 81 MG EC tablet  Commonly known as:  ECOTRIN  Take 1 tablet (81 mg total) by mouth once daily.      carvedilol 12.5 MG tablet  Commonly known as:  COREG  Take 1 tablet (12.5 mg total) by mouth 2 (two) times daily with meals.     clopidogrel 75 mg tablet  Commonly known as:  PLAVIX  TAKE 1 TABLET EVERY DAY     docusate sodium 100 MG capsule  Commonly known as:  COLACE  Take 1 capsule (100 mg total) by mouth 2 (two) times daily.     ergocalciferol 50,000 unit Cap  Commonly known as:  ERGOCALCIFEROL  Take 1 capsule (50,000 Units total) by mouth every 7 days.     isosorbide mononitrate 60 MG 24 hr tablet  Commonly known as:  IMDUR  Take 1 tablet (60 mg total) by mouth once daily.     losartan 100 MG tablet  Commonly known as:  COZAAR  Take 1 tablet (100 mg total) by mouth once daily.     nitroglycerin 0.6 MG Subl  Commonly known as:  NITROSTAT  Place 1 tablet (0.6 mg total) under the tongue every 5 (five) minutes as needed (max 3/ per episode).     pantoprazole 40 MG tablet  Commonly known as:  PROTONIX  Take 1 tablet (40 mg total) by mouth once daily.     pravastatin 80 MG tablet  Commonly known as:  PRAVACHOL  Take 1 tablet (80 mg total) by mouth every evening.        STOP taking these medications    cyclobenzaprine 10 MG tablet  Commonly known as:  FLEXERIL            Indwelling Lines/Drains at time of discharge:   Lines/Drains/Airways     Peripherally Inserted Central Catheter Line                 PICC Double Lumen 04/03/19 1800 left basilic less than 1 day                Time spent on the discharge of patient: 30 minutes  Patient was seen and examined on the date of discharge and determined to be suitable for discharge.         Stefan Leary MD  Department of Hospital Medicine  [facility]

## 2019-04-04 NOTE — PROGRESS NOTES
Report called to MANI Davis @ [phone], [facility]. Spoke with Lesa in admission on transportation. Transportation should be here shortly.

## 2019-04-04 NOTE — PLAN OF CARE
Problem: Adult Inpatient Plan of Care  Goal: Plan of Care Review  Outcome: Ongoing (interventions implemented as appropriate)  Pt is progressing appropriately in nursing plan of care. Fall prevention measures maintained and family at bedside. Bedside rounding , frequent checks and call bell some measures utilized. No falls /injuries overngiht. Turning [id number] for pressure ulcer prevention, comfort and pulm hygiene. No fever/chills overnight, deep breathing and coughing encouraged. Breath sounds are unchanged, no wheezing but +coarse bilaterally. No neuro changes overnight, familiar items in room. Pt is voiding appropriately without complaints.

## 2019-04-04 NOTE — SUBJECTIVE & OBJECTIVE
Interval History:  Slept better last night.  Renal function improving steadily.  Intermittent upper back and neck pain improve but continues.  No fevers.  Ordered MRI of the neck but patient refused.    Review of Systems   Constitutional: Positive for activity change. Negative for chills, fatigue and fever.   HENT: Negative.  Negative for congestion and sore throat.    Eyes: Negative.  Negative for visual disturbance.   Respiratory: Negative.  Negative for cough, shortness of breath and wheezing.    Cardiovascular: Negative.  Negative for chest pain.   Gastrointestinal: Negative for abdominal pain, diarrhea, nausea and vomiting.   Endocrine: Negative.    Genitourinary: Negative.    Musculoskeletal: Positive for neck pain. Negative for myalgias and neck stiffness.   Skin: Negative.  Negative for color change and pallor.   Allergic/Immunologic: Negative.    Neurological: Positive for weakness.   Hematological: Negative.    Psychiatric/Behavioral: Negative.  Negative for agitation and confusion.   All other systems reviewed and are negative.    Objective:     Vital Signs (Most Recent):  Temp: 97.9 °F (36.6 °C) (04/04/19 0803)  Pulse: 78 (04/04/19 0803)  Resp: 16 (04/04/19 0803)  BP: (!) 165/78 (04/04/19 0803)  SpO2: (!) 92 % (04/04/19 0803) Vital Signs (24h Range):  Temp:  [96.4 °F (35.8 °C)-98.7 °F (37.1 °C)] 97.9 °F (36.6 °C)  Pulse:  [67-85] 78  Resp:  [14-20] 16  SpO2:  [92 %-97 %] 92 %  BP: (101-199)/(55-87) 165/78     Weight: 87.1 kg (192 lb)  Body mass index is 25.33 kg/m².    Intake/Output Summary (Last 24 hours) at 4/4/2019 1103  Last data filed at 4/4/2019 1000  Gross per 24 hour   Intake 2303.17 ml   Output --   Net 2303.17 ml      Physical Exam   Constitutional: He appears well-developed and well-nourished.   HENT:   Head: Normocephalic and atraumatic.   Eyes: Pupils are equal, round, and reactive to light. EOM are normal.   Neck: Normal range of motion. No JVD present. Muscular tenderness present. Neck  rigidity present.   Cardiovascular: Regular rhythm and intact distal pulses. Tachycardia present.   Murmur heard.   Systolic murmur is present with a grade of 3/6.  Pulmonary/Chest: Effort normal. No respiratory distress. He has no wheezes. He has rhonchi in the right middle field.   Abdominal: Soft. Bowel sounds are normal.   Musculoskeletal: Normal range of motion. He exhibits no deformity.   Neurological: He is alert. He has normal reflexes.   Skin: Skin is warm and dry. Capillary refill takes 2 to 3 seconds.   Nursing note and vitals reviewed.      Significant Labs:   ABGs: No results for input(s): PH, PCO2, HCO3, POCSATURATED, BE, TOTALHB, COHB, METHB, O2HB, POCFIO2 in the last 48 hours.  Blood Culture:   No results for input(s): LABBLOO in the last 48 hours.  BMP:   Recent Labs   Lab 04/04/19 0444   GLU 96   *   K 3.2*      CO2 22*   BUN 24*   CREATININE 1.5*   CALCIUM 8.3*     CBC:   Recent Labs   Lab 04/03/19  0516 04/04/19  0444   WBC 10.12 13.66*   HGB 7.9* 8.4*   HCT 24.7* 25.7*    168     CMP:   Recent Labs   Lab 04/03/19  0516 04/04/19  0444   * 133*   K 3.3* 3.2*    102   CO2 23 22*   * 96   BUN 25* 24*   CREATININE 1.5* 1.5*   CALCIUM 8.0* 8.3*   ANIONGAP 8 9   EGFRNONAA 46* 46*     Troponin:   No results for input(s): TROPONINI in the last 48 hours.  Urine Studies:   No results for input(s): COLORU, APPEARANCEUA, PHUR, SPECGRAV, PROTEINUA, GLUCUA, KETONESU, BILIRUBINUA, OCCULTUA, NITRITE, UROBILINOGEN, LEUKOCYTESUR, RBCUA, WBCUA, BACTERIA, SQUAMEPITHEL, HYALINECASTS in the last 48 hours.    Invalid input(s): WRIGHTSUR  All pertinent labs within the past 24 hours have been reviewed.    Significant Imaging: I have reviewed all pertinent imaging results/findings within the past 24 hours.Interval History:   No acute [id number].    Review of Systems  Objective:     Vital Signs (Most Recent):  Temp: 97.7 °F (36.5 °C) (04/03/19 1943)  Pulse: 75 (04/03/19 1945)  Resp: 18  (04/03/19 1615)  BP: (!) 160/70 (04/03/19 1945)  SpO2: (!) 92 % (04/03/19 1945) Vital Signs (24h Range):  Temp:  [96.4 °F (35.8 °C)-99.8 °F (37.7 °C)] 97.7 °F (36.5 °C)  Pulse:  [63-79] 75  Resp:  [14-20] 18  SpO2:  [90 %-99 %] 92 %  BP: (101-177)/(55-77) 160/70     Weight: 87.1 kg (192 lb)  Body mass index is 25.33 kg/m².    Intake/Output Summary (Last 24 hours) at 4/3/2019 2021  Last data filed at 4/3/2019 1505  Gross per 24 hour   Intake 2453.34 ml   Output --   Net 2453.34 ml      Physical Exam    Significant Labs: All pertinent labs within the past 24 hours have been reviewed.    Significant Imaging: I have reviewed all pertinent imaging results/findings within the past 24 hours.

## 2019-04-04 NOTE — PROGRESS NOTES
04/04/19 0415   Vital Signs   Pulse 85   Heart Rate Source Monitor   BP (!) 199/87   MAP (mmHg) 125   BP Location Right arm   BP Method Automatic   Patient Position Lying   At 0415 reported elevated BP to MILENA Salgado. Order rec'd to admin am hydralazine dose early.   I will reassess BP after admin dose.   Pt is asymptomatic , resting with eyes closed with spouse at the bedside. No changes in ecg rhythm . Cont to monitor.

## 2019-04-04 NOTE — PLAN OF CARE
Patient received his PICC line after 1800 yesterday. Patient ready for discharge today. Contacted Alycia Mccoy Marlena at [facility] they will send a  to  the patient. Update to Echo wilson nurse. Primary nurse to call report.       04/04/19 0663   Post-Acute Status   Post-Acute Authorization Placement   Post-Acute Placement Status Set-up Complete

## 2019-04-04 NOTE — ASSESSMENT & PLAN NOTE
Patient wife endorses decreased appetite and oral intake at home.  IV bolus given in ER.  Repeat labs this morning.  Continue gentle IV hydration    patient has not been taking his medications at home as prescribed.  Blood pressure elevated.   Nitropaste and assess response, resume home meds  For Bp control.   check chest x-ray and abdominal KUB.  Consider checking ultrasound of kidneys pending course

## 2019-04-04 NOTE — PROGRESS NOTES
Pt left unit. [facility] transportation via wheelchair. D/c paperwork given to , wife accompanied. Hard script for percocet 5-325 mg given with packet. No distress noted. No complaints at this time. TORRES double lumen Picc line in place.

## 2019-04-04 NOTE — PROGRESS NOTES
04/04/19 0510   Vital Signs   Pulse 75   Heart Rate Source Manual   BP (!) 177/77   MAP (mmHg) 111   BP Location Right arm   BP Method Automatic   Patient Position Lying   reported to MILENA Salgado BP. No new orders at this time. Continuing to monitor.

## 2019-04-17 ENCOUNTER — LAB VISIT (OUTPATIENT)
Dept: LAB | Facility: HOSPITAL | Age: 69
End: 2019-04-17
Attending: FAMILY MEDICINE
Payer: MEDICARE

## 2019-04-17 ENCOUNTER — OFFICE VISIT (OUTPATIENT)
Dept: FAMILY MEDICINE | Facility: CLINIC | Age: 69
End: 2019-04-17
Payer: MEDICARE

## 2019-04-17 VITALS
WEIGHT: 174.25 LBS | HEART RATE: 91 BPM | BODY MASS INDEX: 22.99 KG/M2 | TEMPERATURE: 98 F | OXYGEN SATURATION: 98 % | SYSTOLIC BLOOD PRESSURE: 134 MMHG | DIASTOLIC BLOOD PRESSURE: 72 MMHG

## 2019-04-17 DIAGNOSIS — I10 ESSENTIAL HYPERTENSION: Chronic | ICD-10-CM

## 2019-04-17 DIAGNOSIS — F41.9 ANXIETY: ICD-10-CM

## 2019-04-17 DIAGNOSIS — R07.9 CHEST PAIN, UNSPECIFIED TYPE: Primary | ICD-10-CM

## 2019-04-17 DIAGNOSIS — I25.2 OLD MI (MYOCARDIAL INFARCTION): Chronic | ICD-10-CM

## 2019-04-17 DIAGNOSIS — R21 RASH: ICD-10-CM

## 2019-04-17 PROBLEM — N17.9 ACUTE RENAL FAILURE SUPERIMPOSED ON CHRONIC KIDNEY DISEASE: Status: RESOLVED | Noted: 2019-03-27 | Resolved: 2019-04-17

## 2019-04-17 PROBLEM — N18.9 ACUTE RENAL FAILURE SUPERIMPOSED ON CHRONIC KIDNEY DISEASE: Status: RESOLVED | Noted: 2019-03-27 | Resolved: 2019-04-17

## 2019-04-17 LAB
ANION GAP SERPL CALC-SCNC: 9 MMOL/L (ref 8–16)
BUN SERPL-MCNC: 21 MG/DL (ref 8–23)
CALCIUM SERPL-MCNC: 9.4 MG/DL (ref 8.7–10.5)
CHLORIDE SERPL-SCNC: 106 MMOL/L (ref 95–110)
CO2 SERPL-SCNC: 22 MMOL/L (ref 23–29)
CREAT SERPL-MCNC: 2.1 MG/DL (ref 0.5–1.4)
EST. GFR  (AFRICAN AMERICAN): 35.8 ML/MIN/1.73 M^2
EST. GFR  (NON AFRICAN AMERICAN): 31 ML/MIN/1.73 M^2
GLUCOSE SERPL-MCNC: 103 MG/DL (ref 70–110)
POTASSIUM SERPL-SCNC: 5 MMOL/L (ref 3.5–5.1)
SODIUM SERPL-SCNC: 137 MMOL/L (ref 136–145)

## 2019-04-17 PROCEDURE — 99214 OFFICE O/P EST MOD 30 MIN: CPT | Mod: HCNC,S$GLB,, | Performed by: FAMILY MEDICINE

## 2019-04-17 PROCEDURE — 99999 PR PBB SHADOW E&M-EST. PATIENT-LVL III: CPT | Mod: PBBFAC,HCNC,, | Performed by: FAMILY MEDICINE

## 2019-04-17 PROCEDURE — 3078F PR MOST RECENT DIASTOLIC BLOOD PRESSURE < 80 MM HG: ICD-10-PCS | Mod: HCNC,CPTII,S$GLB, | Performed by: FAMILY MEDICINE

## 2019-04-17 PROCEDURE — 36415 COLL VENOUS BLD VENIPUNCTURE: CPT | Mod: HCNC,PO

## 2019-04-17 PROCEDURE — 3075F SYST BP GE 130 - 139MM HG: CPT | Mod: HCNC,CPTII,S$GLB, | Performed by: FAMILY MEDICINE

## 2019-04-17 PROCEDURE — 80048 BASIC METABOLIC PNL TOTAL CA: CPT | Mod: HCNC

## 2019-04-17 PROCEDURE — 1101F PT FALLS ASSESS-DOCD LE1/YR: CPT | Mod: HCNC,CPTII,S$GLB, | Performed by: FAMILY MEDICINE

## 2019-04-17 PROCEDURE — 1101F PR PT FALLS ASSESS DOC 0-1 FALLS W/OUT INJ PAST YR: ICD-10-PCS | Mod: HCNC,CPTII,S$GLB, | Performed by: FAMILY MEDICINE

## 2019-04-17 PROCEDURE — 3078F DIAST BP <80 MM HG: CPT | Mod: HCNC,CPTII,S$GLB, | Performed by: FAMILY MEDICINE

## 2019-04-17 PROCEDURE — 3075F PR MOST RECENT SYSTOLIC BLOOD PRESS GE 130-139MM HG: ICD-10-PCS | Mod: HCNC,CPTII,S$GLB, | Performed by: FAMILY MEDICINE

## 2019-04-17 PROCEDURE — 99214 PR OFFICE/OUTPT VISIT, EST, LEVL IV, 30-39 MIN: ICD-10-PCS | Mod: HCNC,S$GLB,, | Performed by: FAMILY MEDICINE

## 2019-04-17 PROCEDURE — 99999 PR PBB SHADOW E&M-EST. PATIENT-LVL III: ICD-10-PCS | Mod: PBBFAC,HCNC,, | Performed by: FAMILY MEDICINE

## 2019-04-17 RX ORDER — TRIAMCINOLONE ACETONIDE 1 MG/G
CREAM TOPICAL 2 TIMES DAILY
Qty: 1 TUBE | Refills: 1 | Status: SHIPPED | OUTPATIENT
Start: 2019-04-17 | End: 2019-04-17 | Stop reason: SDUPTHER

## 2019-04-17 RX ORDER — TRIAMCINOLONE ACETONIDE 1 MG/G
CREAM TOPICAL 2 TIMES DAILY
Qty: 1 TUBE | Refills: 1 | Status: SHIPPED | OUTPATIENT
Start: 2019-04-17 | End: 2020-01-13 | Stop reason: ALTCHOICE

## 2019-04-17 RX ORDER — SERTRALINE HYDROCHLORIDE 50 MG/1
50 TABLET, FILM COATED ORAL DAILY
Qty: 30 TABLET | Refills: 11 | Status: SHIPPED | OUTPATIENT
Start: 2019-04-17 | End: 2020-01-13 | Stop reason: ALTCHOICE

## 2019-04-17 NOTE — PROGRESS NOTES
Chief Complaint:    Chief Complaint   Patient presents with    Follow-up       History of Present Illness:    Patient is here for follow-up:  He was hospitalized with what appeared to be meningitis was treated he is doing better now it does get anxious a lot.  He was found to have coronary artery disease disease with 70% or more stenosis in at least 3 or more coronaries was asked to undergo CABG but when he saw the surgeon was told that he is too sick to undergo any type of major invasive surgery and he may not make it through the surgery.  He is very concerned about that.  He still gets chest pains which are relieved by nitroglycerin.  Also has some itching on the neck area for the last few days    ROS:  Review of Systems   Constitutional: Negative for activity change, chills, fatigue, fever and unexpected weight change.   HENT: Negative for congestion, ear discharge, ear pain, hearing loss, postnasal drip and rhinorrhea.    Eyes: Negative for pain and visual disturbance.   Respiratory: Negative for cough, chest tightness and shortness of breath.    Cardiovascular: Negative for chest pain and palpitations.   Gastrointestinal: Negative for abdominal pain, diarrhea and vomiting.   Endocrine: Negative for heat intolerance.   Genitourinary: Negative for dysuria, flank pain, frequency and hematuria.   Musculoskeletal: Negative for back pain, gait problem and neck pain.   Skin: Negative for color change and rash.   Neurological: Negative for dizziness, tremors, seizures, numbness and headaches.   Psychiatric/Behavioral: Negative for agitation, hallucinations, self-injury, sleep disturbance and suicidal ideas. The patient is not nervous/anxious.        Past Medical History:   Diagnosis Date    Analgesic nephropathy     Anemia     AP (angina pectoris) 1/11/2019    Arthritis     Colon polyp     Repeat colonoscopy due in 9/14    Coronary artery disease     Diverticulosis     colonoscopy 2/21/2014    Encounter for  blood transfusion     GERD (gastroesophageal reflux disease)     Hemorrhoids     colonoscopy 2/21/2014    Horseshoe kidney     Hyperglycemia 3/17/2014    Hyperlipidemia     Hypertension     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    Late complications of amputation stump     rseolved with further amputation( MRSA then none since 2014)    Lipoma of colon     colonoscopy 2/21/2014    Myocardial infarction     per patient 2000 & 9/2012    Peripheral vascular disease     Phantom limb syndrome     patient reports only intermittent not problematic, not worsening    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    Spinal cord disease     L4L5 disc    Stroke     Tobacco dependence     resolved    Ureteral stent retained        Social History:  Social History     Socioeconomic History    Marital status:      Spouse name: Laury    Number of children: 2    Years of education: Not on file    Highest education level: Not on file   Occupational History    Occupation: Retired      Comment: [employer]   Social Needs    Financial resource strain: Not on file    Food insecurity:     Worry: Not on file     Inability: Not on file    Transportation needs:     Medical: Not on file     Non-medical: Not on file   Tobacco Use    Smoking status: Former Smoker     Packs/day: 1.00     Years: 15.00     Pack years: 15.00     Last attempt to quit: 1/1/2009     Years since quitting: 10.2    Smokeless tobacco: Never Used   Substance and Sexual Activity    Alcohol use: No    Drug use: No     Comment: Is on prescription opiod, no non prescribed use    Sexual activity: Not Currently     Partners: Female   Lifestyle    Physical activity:     Days per week: Not on file     Minutes per session: Not on file    Stress: Not on file   Relationships    Social connections:     Talks on phone: Not on file     Gets together: Not on file     Attends [employer] service: Not on file     Active member of  club or organization: Not on file     Attends meetings of clubs or organizations: Not on file     Relationship status: Not on file   Other Topics Concern    Not on file   Social History Narrative     . 4 children alive and well. 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  Still drives. Does not have a Living Will.       Family History:   family history includes COPD in his mother; Cancer in his mother; Diabetes in his daughter; Heart disease in his father.    Health Maintenance   Topic Date Due    Colonoscopy  02/21/2019    Lipid Panel  03/27/2020    High Dose Statin  04/17/2020    TETANUS VACCINE  12/04/2024    Hepatitis C Screening  Completed    Zoster Vaccine  Completed    Pneumococcal Vaccine (65+ High/Highest Risk)  Completed    Influenza Vaccine  Completed    Abdominal Aortic Aneurysm Screening  Addressed       Physical Exam:    Vital Signs  Temp: 97.6 °F (36.4 °C)  Temp src: Tympanic  Pulse: 91  SpO2: 98 %  BP: 134/72  BP Location: Left arm  Patient Position: Sitting  Pain Score: 0-No pain  Height and Weight  Weight: 79.1 kg (174 lb 4.4 oz)]    Body mass index is 22.99 kg/m².    Physical Exam   Constitutional: He is oriented to person, place, and time. He appears well-developed.   HENT:   Mouth/Throat: Oropharynx is clear and moist.   Eyes: Pupils are equal, round, and reactive to light. Conjunctivae are normal.   Neck: Normal range of motion. Neck supple.   Cardiovascular: Normal rate, regular rhythm and normal heart sounds.   No murmur heard.  Pulmonary/Chest: Effort normal and breath sounds normal. No respiratory distress. He has no wheezes. He has no rales. He exhibits no tenderness.   Abdominal: Soft. He exhibits no distension and no mass. There is no tenderness. There is no guarding.   Musculoskeletal: He exhibits no edema or tenderness.   Lymphadenopathy:     He has no cervical adenopathy.   Neurological: He is alert and oriented to person, place, and  time. He has normal reflexes.   Skin: Skin is warm and dry.   Psychiatric: He has a normal mood and affect. His behavior is normal. Judgment and thought content normal.         Assessment:      ICD-10-CM ICD-9-CM   1. Chest pain, unspecified type R07.9 786.50   2. Essential hypertension I10 401.9   3. CAD;Old MI ; s/p stents x 3 (2000)  I25.2 412   4. Rash R21 782.1   5. Anxiety F41.9 300.00         Plan:    We discussed the results of his left heart catheterization with him and discussed that at there could be other causes of chest pain as well like esophageal spasm which will need to be explored as well.  None do encourage him to keep appointment with Cardiology and with his surgeon.  Patient given triamcinolone for the rash  Recommend trial of Zoloft for help with anxiety that he is having with scaly his current sickness  Follow-up 1 month      Orders Placed This Encounter   Procedures    Basic metabolic panel       Current Outpatient Medications   Medication Sig Dispense Refill    amLODIPine (NORVASC) 10 MG tablet TAKE 1 TABLET EVERY DAY 90 tablet 3    aspirin (ECOTRIN) 81 MG EC tablet Take 1 tablet (81 mg total) by mouth once daily.  0    carvedilol (COREG) 12.5 MG tablet Take 1 tablet (12.5 mg total) by mouth 2 (two) times daily with meals. 180 tablet 3    clopidogrel (PLAVIX) 75 mg tablet TAKE 1 TABLET EVERY DAY 90 tablet 3    docusate sodium (COLACE) 100 MG capsule Take 1 capsule (100 mg total) by mouth 2 (two) times daily. 60 capsule 0    ergocalciferol (ERGOCALCIFEROL) 50,000 unit Cap Take 1 capsule (50,000 Units total) by mouth every 7 days. 12 capsule 5    isosorbide mononitrate (IMDUR) 60 MG 24 hr tablet Take 1 tablet (60 mg total) by mouth once daily. 30 tablet 11    losartan (COZAAR) 100 MG tablet Take 1 tablet (100 mg total) by mouth once daily. 90 tablet 3    nitroglycerin (NITROSTAT) 0.6 MG Subl Place 1 tablet (0.6 mg total) under the tongue every 5 (five) minutes as needed (max 3/ per  episode). 30 tablet 1    oxyCODONE-acetaminophen (PERCOCET) 5-325 mg per tablet Take 1 tablet by mouth every 6 (six) hours as needed for Pain. 10 tablet 0    pantoprazole (PROTONIX) 40 MG tablet Take 1 tablet (40 mg total) by mouth once daily. 30 tablet 11    pravastatin (PRAVACHOL) 80 MG tablet Take 1 tablet (80 mg total) by mouth every evening. 90 tablet 3    sertraline (ZOLOFT) 50 MG tablet Take 1 tablet (50 mg total) by mouth once daily. 30 tablet 11    triamcinolone acetonide 0.1% (KENALOG) 0.1 % cream Apply topically 2 (two) times daily. for 10 days 1 Tube 1     No current facility-administered medications for this visit.        Medications Discontinued During This Encounter   Medication Reason    triamcinolone acetonide 0.025% (KENALOG) 0.025 % cream     triamcinolone acetonide 0.1% (KENALOG) 0.1 % cream     triamcinolone acetonide 0.025% (KENALOG) 0.025 % cream     triamcinolone acetonide 0.1% (KENALOG) 0.1 % cream     triamcinolone acetonide 0.1% (KENALOG) 0.1 % cream Reorder       No follow-ups on file.      Norma Nuñez MD

## 2019-04-22 ENCOUNTER — TELEPHONE (OUTPATIENT)
Dept: FAMILY MEDICINE | Facility: CLINIC | Age: 69
End: 2019-04-22

## 2019-04-22 ENCOUNTER — TELEPHONE (OUTPATIENT)
Dept: NEPHROLOGY | Facility: CLINIC | Age: 69
End: 2019-04-22

## 2019-04-22 DIAGNOSIS — N28.9 FUNCTION KIDNEY DECREASED: Primary | ICD-10-CM

## 2019-04-22 NOTE — TELEPHONE ENCOUNTER
Notes recorded by Norma Nuñez MD on 4/21/2019 at 4:49 PM CDT  Kidney function is worsening, please see Nephrology doctor Indira

## 2019-04-22 NOTE — TELEPHONE ENCOUNTER
----- Message from Suzanna Steve LPN sent at 4/22/2019  2:51 PM CDT -----  Patient needs to see Dr Jacobson sooner than scheduled appt per Dr Nuñez. Please contact patient with sooner appt date/time.  Thanks  Suzanna

## 2019-04-22 NOTE — PROGRESS NOTES
Notified pt caregiver/wife Laury of abnormal results. Laury verbalized understanding and scheduled an appointment with Dr Jacobson per caregiver's request.

## 2019-04-23 ENCOUNTER — TELEPHONE (OUTPATIENT)
Dept: ADMINISTRATIVE | Facility: CLINIC | Age: 69
End: 2019-04-23

## 2019-05-13 DIAGNOSIS — I10 ESSENTIAL HYPERTENSION: Chronic | ICD-10-CM

## 2019-05-13 RX ORDER — CLOPIDOGREL BISULFATE 75 MG/1
TABLET ORAL
Qty: 90 TABLET | Refills: 3 | Status: SHIPPED | OUTPATIENT
Start: 2019-05-13 | End: 2020-03-23

## 2019-05-13 RX ORDER — AMLODIPINE BESYLATE 10 MG/1
TABLET ORAL
Qty: 90 TABLET | Refills: 3 | Status: SHIPPED | OUTPATIENT
Start: 2019-05-13 | End: 2020-03-23

## 2019-05-15 RX ORDER — OMEPRAZOLE 20 MG/1
CAPSULE, DELAYED RELEASE ORAL
Qty: 180 CAPSULE | Refills: 3 | Status: SHIPPED | OUTPATIENT
Start: 2019-05-15 | End: 2020-04-21

## 2019-06-03 ENCOUNTER — OFFICE VISIT (OUTPATIENT)
Dept: UROLOGY | Facility: CLINIC | Age: 69
End: 2019-06-03
Payer: MEDICARE

## 2019-06-03 VITALS — WEIGHT: 179.56 LBS | BODY MASS INDEX: 23.69 KG/M2

## 2019-06-03 DIAGNOSIS — N13.5 URETERAL OBSTRUCTION: Primary | ICD-10-CM

## 2019-06-03 PROCEDURE — 1101F PT FALLS ASSESS-DOCD LE1/YR: CPT | Mod: HCNC,CPTII,S$GLB, | Performed by: UROLOGY

## 2019-06-03 PROCEDURE — 99214 OFFICE O/P EST MOD 30 MIN: CPT | Mod: HCNC,S$GLB,, | Performed by: UROLOGY

## 2019-06-03 PROCEDURE — 99999 PR PBB SHADOW E&M-EST. PATIENT-LVL III: CPT | Mod: PBBFAC,HCNC,, | Performed by: UROLOGY

## 2019-06-03 PROCEDURE — 99999 PR PBB SHADOW E&M-EST. PATIENT-LVL III: ICD-10-PCS | Mod: PBBFAC,HCNC,, | Performed by: UROLOGY

## 2019-06-03 PROCEDURE — 99214 PR OFFICE/OUTPT VISIT, EST, LEVL IV, 30-39 MIN: ICD-10-PCS | Mod: HCNC,S$GLB,, | Performed by: UROLOGY

## 2019-06-03 PROCEDURE — 1101F PR PT FALLS ASSESS DOC 0-1 FALLS W/OUT INJ PAST YR: ICD-10-PCS | Mod: HCNC,CPTII,S$GLB, | Performed by: UROLOGY

## 2019-06-03 NOTE — PROGRESS NOTES
Chief Complaint: Left ureteral stricture with stent    HPI:   6/3/19: Time to change left stent, will arrange.  Reviewed history in detail.  Needs US.  Had a heart cath and needs a CABG but can't be done due to prior reconstruction. Been treated for meningitis recently and still has neck pain.  5/17/18: Time to change left stent, will arrange.  No acute changes.  4/24/17: Due for left stent change, will arrange.  4/22/16: No changes in last year feeling very well.  Here to replace left stent.  Says he is doing great.  4/22/15: Doing fine with stent change. Stent had no problems after a year; RTC 3/1/16 to plan for next change.   3/19/15: Pt had his stent replaced 1/2014 but has not been back since.  He had major surgery for bowel abscess in the months after he saw me with a lot of vascular reconstruction.  Here to discuss replacement of his left ureteral stent.  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  1/17/14: Pt returns for f/u.  Recent CT shows stent still in place with no urolithiasis.  He has had a fem-fem bypass and his legs are working much better; he felt so good.    10/2012: Pt was found to have left ureteral stricture secondary to AAA repair. A stent was placed in May and then replaced by me last week. He has a long stricture of the middle ureter, such that a reimplant would require a psoas hitch/boari flap reaching to the upper ureter. This is complicated by the patient having a horseshoe kidney so nephropexy with proximal mobilization would be impractical. He is tolerating the stent well and is practically unaware of its presence.  >>> he was to have Mag3 and  f/u from this appt in 5 mo for stent replacement but did not return, Mag3 showed improved drainage with stent..... <<<  9/2012: About 3 months ago pt had severe left kidney infection and had an MI during the same period. He was diagnosed as having a left ureteral obstruction at the level of the iliac crest. A left ureteral stent was placed.  He had a Mag3 study prior to stent placement that showed normal uptake bilaterally, normal right and delayed left excretion (44 min). Recent MI caused some damage but no further stenting or CABG indicated now. No history of kidney stones. Had a full body scan in 2008 that showed left hydroureter and had a stent placed at that time and it was removed 8 months later with no plan for followup. Current stent has been in place for two months. No LUTS. Pain 0/10. Had an episode of gross hematuria one time last week.    Allergies:  Morphine    Medications:  has a current medication list which includes the following prescription(s): amlodipine, aspirin, carvedilol, clopidogrel, docusate sodium, isosorbide mononitrate, losartan, nitroglycerin, omeprazole, oxycodone-acetaminophen, pantoprazole, pravastatin, sertraline, and triamcinolone acetonide 0.1%.    Review of Systems:  General: No fever, chills, fatigability, or weight loss.  Skin: No rashes, itching, or changes in color or texture of skin.  Chest: Denies MACKEY, cyanosis, wheezing, cough, and sputum production.  Abdomen: Appetite fine. No weight loss. Denies diarrhea, abdominal pain, hematemesis, or blood in stool.  Musculoskeletal: No joint stiffness or swelling. Denies back pain.  : As above.  All other review of systems negative.    PMH:   has a past medical history of Analgesic nephropathy, Anemia, AP (angina pectoris) (1/11/2019), Arthritis, Colon polyp, Coronary artery disease, Diverticulosis, Encounter for blood transfusion, GERD (gastroesophageal reflux disease), Hemorrhoids, Horseshoe kidney, Hyperglycemia (3/17/2014), Hyperlipidemia, Hypertension, Infection of aortic graft (3/14/2014), Late complications of amputation stump, Lipoma of colon, Myocardial infarction, Peripheral vascular disease, Phantom limb syndrome, S/P aorto-bifemoral bypass surgery (3/17/2014), Spinal cord disease, Stroke, Tobacco dependence, and Ureteral stent retained.    PSH:   has a past  surgical history that includes Aorta - bilateral femoral artery bypass graft (2014); Coronary angioplasty with stent (2000); Abdominal aortic aneurysm repair; Ureteral stent placement (Left); right below knee amputation (2009 (approx)); Foot amputation through metatarsal (1996); Lung lobectomy (Right, 1970s); Abdominal aortic aneurysm repair (1996/2014); Kidney surgery (2014); Foot surgery (Bilateral, 1980's); Amputation; Tonsillectomy (1955 aprox); Small intestine surgery (2014); Cystoscopy w/ ureteral stent placement (Left, 5/29/2018); Cystoscopy w/ retrogrades (Left, 5/29/2018); Cystoscopy w/ ureteral stent removal (Left, 5/29/2018); and Left heart catheterization (Left, 3/7/2019).    FamHx: family history includes COPD in his mother; Cancer in his mother; Diabetes in his daughter; Heart disease in his father.    SocHx:  reports that he quit smoking about 10 years ago. He has a 15.00 pack-year smoking history. He has never used smokeless tobacco. He reports that he does not drink alcohol or use drugs.      Physical Exam:  There were no vitals filed for this visit.  General: A&Ox3, no apparent distress, no deformities  Neck: No masses, normal thyroid  Abdomen: Soft, NT, ND  Skin: The skin is warm and dry. No jaundice.  Ext: No c/c/e.  : deferred to cysto    Labs/Studies:   PSA    11/13: 0.5    12/14: 0.4  Urinalysis performed in clinic, summary: UA normal    Impression/Plan:   1. Doing well with stent and stent did well for over a year; will replace on one year intervals.  Will schedule soon but not yet since he is getting worked up for other things.  Renal US.  Needs stent change but risks are adverse.

## 2019-06-06 ENCOUNTER — TELEPHONE (OUTPATIENT)
Dept: UROLOGY | Facility: CLINIC | Age: 69
End: 2019-06-06

## 2019-06-06 NOTE — TELEPHONE ENCOUNTER
----- Message from Fred Marquez sent at 6/6/2019  7:29 AM CDT -----  Contact: self  Type:  Needs Medical Advice    Who Called: pt  Symptoms (please be specific):n/a   How long has patient had these symptoms:n/a  Pharmacy name and phone #: n/a  Would the patient rather a call back or a response via [facility]? Call back  Best Call Back Number: [phone]  Additional Information: pt wants to cancel appt    Thanks,  Fred Marquez

## 2019-06-06 NOTE — TELEPHONE ENCOUNTER
Returned pt's call, canceled ultrasound appt today. Pt states he has to leave out of town & will call back to reschedule when he returns.

## 2019-06-28 ENCOUNTER — TELEPHONE (OUTPATIENT)
Dept: FAMILY MEDICINE | Facility: CLINIC | Age: 69
End: 2019-06-28

## 2019-06-28 NOTE — TELEPHONE ENCOUNTER
Spoke with Mary Jane with [facility] and she needs a prescription for Eval and Treat for Right BK and Left TMA. Will fax on Monday.

## 2019-06-28 NOTE — TELEPHONE ENCOUNTER
----- Message from Oliver Arshad sent at 6/28/2019  9:40 AM CDT -----  Contact: [facility]  Type:  Needs Medical Advice    Who Called: Mary Jane   Symptoms (please be specific):  How long has patient had these symptoms:   Pharmacy name and phone #:    Would the patient rather a call back or a response via [facility]? Call   Best Call Back Number:  [phone]   Additional Information: caller is requesting a call back from the nurse in regards to the pt Rx

## 2019-07-02 ENCOUNTER — TELEPHONE (OUTPATIENT)
Dept: UROLOGY | Facility: CLINIC | Age: 69
End: 2019-07-02

## 2019-07-02 NOTE — TELEPHONE ENCOUNTER
----- Message from Damien Medina sent at 7/2/2019  9:38 AM CDT -----  Contact: Laury Hamilton unable to keep appt tomorrow due to not seeing Dr. Ibarra in [place] yet. Pt states having trouble getting scheduled.         ..[phone] (home)

## 2019-07-02 NOTE — TELEPHONE ENCOUNTER
Spoke with pt's wife, states that as soon as they see Dr. Ibarra they will call back to reschedule this appt.

## 2019-07-10 ENCOUNTER — TELEPHONE (OUTPATIENT)
Dept: FAMILY MEDICINE | Facility: CLINIC | Age: 69
End: 2019-07-10

## 2019-07-24 ENCOUNTER — TELEPHONE (OUTPATIENT)
Dept: FAMILY MEDICINE | Facility: CLINIC | Age: 69
End: 2019-07-24

## 2019-07-24 NOTE — TELEPHONE ENCOUNTER
----- Message from Bety Pereyra sent at 7/24/2019  1:59 PM CDT -----  Contact: Mary Jane/[facility] [phone]  States that she is calling to get clinical notes for pt left partial foot prosthesis. Please fax to [phone]. Please call back at [phone]//thank you acc

## 2019-08-06 ENCOUNTER — TELEPHONE (OUTPATIENT)
Dept: CARDIOTHORACIC SURGERY | Facility: CLINIC | Age: 69
End: 2019-08-06

## 2019-09-04 ENCOUNTER — OFFICE VISIT (OUTPATIENT)
Dept: CARDIOTHORACIC SURGERY | Facility: CLINIC | Age: 69
End: 2019-09-04
Payer: MEDICARE

## 2019-09-04 VITALS
HEART RATE: 76 BPM | SYSTOLIC BLOOD PRESSURE: 128 MMHG | HEIGHT: 73 IN | TEMPERATURE: 98 F | DIASTOLIC BLOOD PRESSURE: 67 MMHG | WEIGHT: 191.81 LBS | BODY MASS INDEX: 25.42 KG/M2 | OXYGEN SATURATION: 98 %

## 2019-09-04 DIAGNOSIS — I25.2 OLD MI (MYOCARDIAL INFARCTION): Primary | Chronic | ICD-10-CM

## 2019-09-04 PROCEDURE — 3074F SYST BP LT 130 MM HG: CPT | Mod: HCNC,CPTII,S$GLB, | Performed by: THORACIC SURGERY (CARDIOTHORACIC VASCULAR SURGERY)

## 2019-09-04 PROCEDURE — 1101F PT FALLS ASSESS-DOCD LE1/YR: CPT | Mod: HCNC,CPTII,S$GLB, | Performed by: THORACIC SURGERY (CARDIOTHORACIC VASCULAR SURGERY)

## 2019-09-04 PROCEDURE — 99999 PR PBB SHADOW E&M-EST. PATIENT-LVL IV: CPT | Mod: PBBFAC,HCNC,, | Performed by: THORACIC SURGERY (CARDIOTHORACIC VASCULAR SURGERY)

## 2019-09-04 PROCEDURE — 3078F DIAST BP <80 MM HG: CPT | Mod: HCNC,CPTII,S$GLB, | Performed by: THORACIC SURGERY (CARDIOTHORACIC VASCULAR SURGERY)

## 2019-09-04 PROCEDURE — 99215 PR OFFICE/OUTPT VISIT, EST, LEVL V, 40-54 MIN: ICD-10-PCS | Mod: HCNC,S$GLB,, | Performed by: THORACIC SURGERY (CARDIOTHORACIC VASCULAR SURGERY)

## 2019-09-04 PROCEDURE — 99215 OFFICE O/P EST HI 40 MIN: CPT | Mod: HCNC,S$GLB,, | Performed by: THORACIC SURGERY (CARDIOTHORACIC VASCULAR SURGERY)

## 2019-09-04 PROCEDURE — 3074F PR MOST RECENT SYSTOLIC BLOOD PRESSURE < 130 MM HG: ICD-10-PCS | Mod: HCNC,CPTII,S$GLB, | Performed by: THORACIC SURGERY (CARDIOTHORACIC VASCULAR SURGERY)

## 2019-09-04 PROCEDURE — 1101F PR PT FALLS ASSESS DOC 0-1 FALLS W/OUT INJ PAST YR: ICD-10-PCS | Mod: HCNC,CPTII,S$GLB, | Performed by: THORACIC SURGERY (CARDIOTHORACIC VASCULAR SURGERY)

## 2019-09-04 PROCEDURE — 3078F PR MOST RECENT DIASTOLIC BLOOD PRESSURE < 80 MM HG: ICD-10-PCS | Mod: HCNC,CPTII,S$GLB, | Performed by: THORACIC SURGERY (CARDIOTHORACIC VASCULAR SURGERY)

## 2019-09-04 PROCEDURE — 99999 PR PBB SHADOW E&M-EST. PATIENT-LVL IV: ICD-10-PCS | Mod: PBBFAC,HCNC,, | Performed by: THORACIC SURGERY (CARDIOTHORACIC VASCULAR SURGERY)

## 2019-09-04 NOTE — PROGRESS NOTES
Subjective:      Patient ID: Kodi Ribeiro is a 69 y.o. male.    Chief Complaint: No chief complaint on file.      HPI:  Kodi Ribeiro is a 69 y.o. male who presents for surgical evaluation of CAD. Medical conditions include coronary artery disease status post MI and status post stenting, abdominal aortic aneurysm status post resection, status post graft infection, MRSA infection, urosepsis status post axillo-femoral bypass, chronic renal insufficiency, status post right BKA, carotid disease status post left CEA, peripheral vascular disease and CVA who was worked up for complains of retrosternal and epigastric pain. Patient is status post cardiac catheterization which shows 3 vessel coronary artery disease.  Patient now presents to clinic for discussion regarding coronary artery bypass grafting.    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    Late complications of amputation stump     rseolved with further amputation( MRSA then none since 2014)    Lipoma of colon     colonoscopy 2/21/2014    Myocardial infarction     per patient 2000 & 9/2012    Peripheral vascular disease     Phantom limb syndrome     patient reports only intermittent not problematic, not worsening    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    Spinal cord disease     L4L5 disc    Stroke     Tobacco dependence     resolved    Ureteral stent retained       Past Surgical History:   Procedure Laterality Date    ABDOMINAL AORTIC ANEURYSM REPAIR      ABDOMINAL AORTIC ANEURYSM REPAIR  1996/2014    AMPUTATION, LOWER LIMB      AORTA - BILATERAL FEMORAL ARTERY BYPASS GRAFT  2014    Left and right leg    CATHETERIZATION, HEART, LEFT Left 3/7/2019    [facility] at [facility] CATH LAB    COLONOSCOPY N/A 2/21/2014    [facility]formed by [facility] Tanner [facility] at [facility] ENDO    CORONARY ANGIOPLASTY WITH STENT PLACEMENT  2000    Three placed in heart    CREATION, BYPASS, ARTERIAL, AORTA TO FEMORAL, BILATERAL Right 3/16/2014    Performed by Stefan [facility]il [facility] at [facility] OR    CYSTOSCOPY WITH STENT EXCHANGE/RETROGRADE PYELOGRAM Left 4/2/2015    Performed by Scooter Jin IV, MD at [facility] OR    CYSTOSCOPY WITH STENT PLACEMENT Left 5/4/2017    Performed by Scooter Jin IV, [facility] at [facility] OR    CYSTOSCOPY WITH STENT PLACEMENT Left 4/28/2016    Performed by Scooter Jin IV, [facility] at [facility] OR    CYSTOSCOPY, WITH RETROGRADE PYELOGRAM Left 5/29/2018    Performed by cSooter Jin IV, [facility] at [facility] OR    CYSTOSCOPY, WITH URETERAL STENT INSERTION Left 5/29/2018    Performed by Scooter Jin IV, [facility] at [facility] OR    CYSTOSCOPY, WITH URETERAL STENT INSERTION Left 1/23/2014    Performed by Scooter Jin IV, [facility] at [facility] OR    CYSTOSCOPY, WITH URETERAL STENT REMOVAL Left 5/29/2018    Performed by Scooter Jin IV, [facility] at [facility] OR    EGD (ESOPHAGOGASTRODUODENOSCOPY) N/A 2/21/2014    Performed by Isaac Tanner MD at [facility] ENDO    ENDARTERECTOMY-CAROTID Left 8/18/2017    Performed by [facility] at [facility] OR    FOOT AMPUTATION THROUGH METATARSAL  1996    left    FOOT SURGERY Bilateral 1980's    per patient multiple toe amputations prior to.  partial foot amputation:first great toe then other toes     KIDNEY SURGERY  2014    per patient separation of horseshoe kidney @ time of AAA repair    LUNG LOBECTOMY Right 1970s    per patient not cancer     PYELOGRAM-RETROGRADE Left 5/4/2017    [facility] at [facility]    RESECTION-BOWEL N/A 3/16/2014    [facility] at [facility] OR    right below knee amputation  2009 (approx)    SMALL INTESTINE SURGERY  2014    per patient partial @ time of aaa repair  not small bowel - large bowel bowel compromised bythtwe AAAbowel    TONSILLECTOMY  1955 aprox    URETERAL STENT PLACEMENT Left     annually replaced since 2012 or so  Dr Jin     Family History     Problem Relation (Age of Onset)    COPD Mother    Cancer Mother    Diabetes Daughter    Heart disease Father        Social History     Socioeconomic History    Marital status:      Spouse name: Laury    Number of children: 2    Years of education: Not on file    Highest education level: Not on file   Occupational History    Occupation: Retired      Comment: [employer]   Social Needs    Financial resource strain: Not on file    Food insecurity:     Worry: Not on file     Inability: Not on file    Transportation needs:     Medical: Not on file     Non-medical: Not on file   Tobacco Use    Smoking status: Former Smoker     Packs/day: 1.00     Years: 15.00     Pack years: 15.00     Last attempt to quit: 1/1/2009     Years since quitting: 10.6    Smokeless tobacco: Never Used   Substance and Sexual Activity    Alcohol use: No    Drug use: No     Comment: Is on prescription opiod, no non prescribed use    Sexual activity: Not Currently     Partners: Female   Lifestyle    Physical activity:     Days per week: Not on file     Minutes per session: Not on file    Stress: Not on file   Relationships    Social connections:     Talks on phone: Not on file     Gets together: Not on file     Attends [employer] service: Not on file     Active member of club or organization: Not on file     Attends meetings of clubs or organizations: Not on file     Relationship status: Not on file   Other  Topics Concern    Not on file   Social History Narrative     . 4 children alive and well. 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  Still drives. Does not have a Living Will.       Current medications Reviewed    Review of Systems   Constitutional: Negative for fatigue.   HENT: Negative for nosebleeds.    Eyes: Negative for visual disturbance.   Respiratory: Negative for cough, chest tightness and shortness of breath.    Cardiovascular: Negative for chest pain, palpitations and leg swelling.   Gastrointestinal: Negative for abdominal pain, nausea and vomiting.   Musculoskeletal: Positive for gait problem.   Skin: Negative for color change.   Neurological: Negative for dizziness, seizures and light-headedness.   Psychiatric/Behavioral: Negative for agitation and confusion.     Objective:   Physical Exam   Constitutional: He is oriented to person, place, and time. He appears well-developed and well-nourished.   HENT:   Head: Normocephalic and atraumatic.   Right Ear: External ear normal.   Left Ear: External ear normal.   Eyes: Pupils are equal, round, and reactive to light. EOM are normal.   Neck: Normal range of motion. Neck supple. No JVD present. No tracheal deviation present.   Cardiovascular: Normal rate, regular rhythm, normal heart sounds and intact distal pulses.   Pulmonary/Chest: Effort normal and breath sounds normal. He exhibits no tenderness.   Abdominal: Soft. Bowel sounds are normal. He exhibits no distension. There is no tenderness.   Musculoskeletal: He exhibits no edema or tenderness.   S/p right BKA   Neurological: He is alert and oriented to person, place, and time.   Skin: Skin is warm and dry.   Psychiatric: He has a normal mood and affect. His behavior is normal. Judgment and thought content normal.       Diagnostic Results: Reviewed   [facility] 3/7/19   1.   Three vessel coronary artery disease.   2.   Normal LVEF.   3.   Diastolic dysfunction.  Cath films  reviewed     TTE    1 - Biatrial enlargement.     2 - Concentric hypertrophy.     3 - No wall motion abnormalities.     4 - Normal left ventricular systolic function (EF 55-60%).     5 - Impaired LV relaxation, normal LAP (grade 1 diastolic dysfunction).     6 - Normal right ventricular systolic function .     7 - The estimated PA systolic pressure is 30 mmHg.     8 - Trivial tricuspid regurgitation.     STS 2%  Assessment:   1. CAD  Plan:   I reviewed the angiogram and history. He is high risk for bypass surgery and he and his wife agree. I would recommend medical management as his anginal symptoms have been minimal and will defer an opinion on PCI to cardiology.

## 2019-09-04 NOTE — LETTER
September 4, 2019      Adriel Boone MD  [address] [facility] [place] [address]           [facility]  [address]  [facility] [address]  Phone: [phone]          Patient: Kodi Ribeiro   MR Number: [id number]   YOB: 1950   Date of Visit: 9/4/2019       Dear Dr. Adriel Boone:    Thank you for referring Kodi Ribeiro to me for evaluation. Attached you will find relevant portions of my assessment and plan of care.    If you have questions, please do not hesitate to call me. I look forward to following Kodi Ribeiro along with you.    Sincerely,    Isatu Lagunas RN    Enclosure  CC:  No Recipients    If you would like to receive this communication electronically, please contact [email or web address] or [phone] to request more information on [employer] access.    For providers and/or their staff who would like to refer a patient to [facility], please contact us through our one-stop-shop provider referral line, Ariela Oconnor, at [phone].    If you feel you have received this communication in error or would no longer like to receive these types of communications, please e-mail [email or web address]

## 2019-09-09 DIAGNOSIS — I25.2 OLD MI (MYOCARDIAL INFARCTION): Primary | Chronic | ICD-10-CM

## 2019-09-17 ENCOUNTER — INITIAL CONSULT (OUTPATIENT)
Dept: CARDIOLOGY | Facility: CLINIC | Age: 69
End: 2019-09-17
Payer: MEDICARE

## 2019-09-17 VITALS
HEART RATE: 77 BPM | WEIGHT: 191.81 LBS | OXYGEN SATURATION: 97 % | BODY MASS INDEX: 25.42 KG/M2 | SYSTOLIC BLOOD PRESSURE: 135 MMHG | HEIGHT: 73 IN | DIASTOLIC BLOOD PRESSURE: 65 MMHG

## 2019-09-17 DIAGNOSIS — G60.9 IDIOPATHIC PERIPHERAL NEUROPATHY: Chronic | ICD-10-CM

## 2019-09-17 DIAGNOSIS — K21.9 HIATAL HERNIA WITH GERD: Chronic | ICD-10-CM

## 2019-09-17 DIAGNOSIS — N18.30 CKD (CHRONIC KIDNEY DISEASE) STAGE 3, GFR 30-59 ML/MIN: Chronic | ICD-10-CM

## 2019-09-17 DIAGNOSIS — I25.119 CORONARY ARTERY DISEASE INVOLVING NATIVE CORONARY ARTERY OF NATIVE HEART WITH ANGINA PECTORIS: ICD-10-CM

## 2019-09-17 DIAGNOSIS — I65.22 LEFT CAROTID ARTERY STENOSIS: ICD-10-CM

## 2019-09-17 DIAGNOSIS — I10 HYPERTENSION, UNSPECIFIED TYPE: Primary | ICD-10-CM

## 2019-09-17 DIAGNOSIS — I10 ESSENTIAL HYPERTENSION: Chronic | ICD-10-CM

## 2019-09-17 DIAGNOSIS — N18.4 CKD STAGE G4/A3, GFR 15-29 AND ALBUMIN CREATININE RATIO >300 MG/G: ICD-10-CM

## 2019-09-17 DIAGNOSIS — I73.9 PVD (PERIPHERAL VASCULAR DISEASE): Chronic | ICD-10-CM

## 2019-09-17 DIAGNOSIS — I25.2 OLD MI (MYOCARDIAL INFARCTION): Chronic | ICD-10-CM

## 2019-09-17 DIAGNOSIS — K44.9 HIATAL HERNIA WITH GERD: Chronic | ICD-10-CM

## 2019-09-17 DIAGNOSIS — E78.5 HYPERLIPIDEMIA, UNSPECIFIED HYPERLIPIDEMIA TYPE: ICD-10-CM

## 2019-09-17 DIAGNOSIS — R07.89 ATYPICAL CHEST PAIN: ICD-10-CM

## 2019-09-17 PROCEDURE — 3075F PR MOST RECENT SYSTOLIC BLOOD PRESS GE 130-139MM HG: ICD-10-PCS | Mod: HCNC,CPTII,S$GLB, | Performed by: INTERNAL MEDICINE

## 2019-09-17 PROCEDURE — 99204 OFFICE O/P NEW MOD 45 MIN: CPT | Mod: HCNC,S$GLB,, | Performed by: INTERNAL MEDICINE

## 2019-09-17 PROCEDURE — 99204 PR OFFICE/OUTPT VISIT, NEW, LEVL IV, 45-59 MIN: ICD-10-PCS | Mod: HCNC,S$GLB,, | Performed by: INTERNAL MEDICINE

## 2019-09-17 PROCEDURE — 99499 RISK ADDL DX/OHS AUDIT: ICD-10-PCS | Mod: HCNC,S$GLB,, | Performed by: INTERNAL MEDICINE

## 2019-09-17 PROCEDURE — 1101F PT FALLS ASSESS-DOCD LE1/YR: CPT | Mod: HCNC,CPTII,S$GLB, | Performed by: INTERNAL MEDICINE

## 2019-09-17 PROCEDURE — 3078F PR MOST RECENT DIASTOLIC BLOOD PRESSURE < 80 MM HG: ICD-10-PCS | Mod: HCNC,CPTII,S$GLB, | Performed by: INTERNAL MEDICINE

## 2019-09-17 PROCEDURE — 1101F PR PT FALLS ASSESS DOC 0-1 FALLS W/OUT INJ PAST YR: ICD-10-PCS | Mod: HCNC,CPTII,S$GLB, | Performed by: INTERNAL MEDICINE

## 2019-09-17 PROCEDURE — 99499 UNLISTED E&M SERVICE: CPT | Mod: HCNC,S$GLB,, | Performed by: INTERNAL MEDICINE

## 2019-09-17 PROCEDURE — 99999 PR PBB SHADOW E&M-EST. PATIENT-LVL IV: CPT | Mod: PBBFAC,HCNC,, | Performed by: INTERNAL MEDICINE

## 2019-09-17 PROCEDURE — 3078F DIAST BP <80 MM HG: CPT | Mod: HCNC,CPTII,S$GLB, | Performed by: INTERNAL MEDICINE

## 2019-09-17 PROCEDURE — 99999 PR PBB SHADOW E&M-EST. PATIENT-LVL IV: ICD-10-PCS | Mod: PBBFAC,HCNC,, | Performed by: INTERNAL MEDICINE

## 2019-09-17 PROCEDURE — 3075F SYST BP GE 130 - 139MM HG: CPT | Mod: HCNC,CPTII,S$GLB, | Performed by: INTERNAL MEDICINE

## 2019-09-17 RX ORDER — ATORVASTATIN CALCIUM 40 MG/1
40 TABLET, FILM COATED ORAL DAILY
Qty: 90 TABLET | Refills: 3 | Status: SHIPPED | OUTPATIENT
Start: 2019-09-17 | End: 2020-01-13 | Stop reason: ALTCHOICE

## 2019-09-17 RX ORDER — ISOSORBIDE MONONITRATE 60 MG/1
120 TABLET, EXTENDED RELEASE ORAL DAILY
Qty: 60 TABLET | Refills: 11
Start: 2019-09-17 | End: 2020-02-13 | Stop reason: SDUPTHER

## 2019-09-17 NOTE — PROGRESS NOTES
Cardiology Clinic Note  Reason for Visit: CAD  Referring physician: Dr. Ibarra    HPI:   It was my pleasure today to meet Mr. Ross, he is a pleasant 69-year-old gentleman with past medical history of PVD, CAD referred by Dr. Brennen hinds for evaluation of coronary artery disease.    Patient reports that he started to have substernal chest pain radiating to his neck and jaw around October 2018.  Patient had a follow-up with his cardiologist will order SPECT stress that was negative, he had an echocardiogram after that that was show normal ejection fraction at 60%.  Patient was referred to Dr. Adriel Boone for left heart catheterization that was performed on March 7, 2019 and that was showing three-vessel coronary artery disease (critical stenosis at mid LAD, left circumflex, ostial RCA).  Patient then was referred to see Dr. Brennen Ibarra for possible CABG but he could not see him around March as the patient was admitted to the hospital with meningitis and he got treated for that.  He recently saw Dr. Ibarra on September 4, 2019 and he was deemed to be a high risk for CABG.  Around March the patient was started on Imdur 60 mg p.o. daily and PPI for possible GERD.  Patient mentioned that his symptoms improved and he is getting less frequent chest pain, he mentions mostly the chest pain happened at night if he eats late.  He has past medical history of GI bleeding with multiple EGDs and colonoscopies between 2007 through 2014 and has a mention there was no obvious source for bleeding was identified.  He has past medical history of coronary artery disease status post PCI in 2000, AAA disease status post resection and graft with subsequent MRSA infection status post axilla femoral bypass in 2014, carotid artery disease status post left CEA, CVA, PAD status post right BKA and left forefoot amputation in the past.  Patient also has past medical history of CKD with last creatinine of 2.1, iron deficiency anemia status  post IV iron with last hemoglobin at 8.4.    Patient mentioned that he still works and use his wheelchair sometimes but he can walk independently, he mentioned he is able to take care of himself and has a good quality of life.  Today he is here with his wife, he is in good spirits and denies any complaints.  ROS:    Constitution: Negative for fever or chills. Negative for weight loss or gain.   HENT: Negative for sore throat or headaches. Negative for rhinorrhea.  Eyes: Negative for blurred or double vision.   Cardiovascular: See above  Pulmonary: Negative for SOB. Negative for cough.   Gastrointestinal: Negative for abdominal pain. Negative for nausea/ vomiting. Negative for diarrhea.   : Negative for dysuria.   Skin: Negative for rashes.  Neurological: Negative for focal weakness or sensory changes.  Psychological: Negative for depression or anxiety.  PMH:     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    Late complications of amputation stump     rseolved with further amputation( MRSA then none since 2014)    Lipoma of colon     colonoscopy 2/21/2014    Myocardial infarction     per patient 2000 & 9/2012    Peripheral vascular disease     Phantom limb syndrome     patient reports only intermittent not problematic, not worsening    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    Spinal cord disease     L4L5 disc    Stroke     Tobacco dependence     resolved    Ureteral stent retained      Past Surgical History:   Procedure Laterality Date     ABDOMINAL AORTIC ANEURYSM REPAIR      ABDOMINAL AORTIC ANEURYSM REPAIR  1996/2014    AMPUTATION, LOWER LIMB      AORTA - BILATERAL FEMORAL ARTERY BYPASS GRAFT  2014    Left and right leg    CATHETERIZATION, HEART, LEFT Left 3/7/2019    [facility] at [facility] CATH LAB    COLONOSCOPY N/A 2/21/2014    [facility]formed by Isaac Tanner MD at [facility] ENDO    CORONARY ANGIOPLASTY WITH STENT PLACEMENT  2000    Three placed in heart    CREATION, BYPASS, ARTERIAL, AORTA TO FEMORAL, BILATERAL Right 3/16/2014    Performed by Stefan Jamil MD at [facility] OR    CYSTOSCOPY WITH STENT EXCHANGE/RETROGRADE PYELOGRAM Left 4/2/2015    Performed by Scooter Jin IV, MD at [facility] OR    CYSTOSCOPY WITH STENT PLACEMENT Left 5/4/2017    [facility] by Scooter Jin IV, MD at [facility] OR    CYSTOSCOPY WITH STENT PLACEMENT Left 4/28/2016    Per[facility] by Scooter Jin IV, [facility] at [facility] OR    CYSTOSCOPY, WITH RETROGRADE PYELOGRAM Left 5/29/2018    [facility] by Scooter Jin IV, [facility] at [facility] OR    CYSTOSCOPY, WITH URETERAL STENT INSERTION Left 5/29/2018    [facility] by Scooter Jin IV, [facility] at [facility] OR    CYSTOSCOPY, WITH URETERAL STENT INSERTION Left 1/23/2014    Per[facility] by Scooter Jin IV, MD at [facility] OR    CYSTOSCOPY, WITH URETERAL STENT REMOVAL Left 5/29/2018    Performed by Scooter Jin IV, MD at [facility] OR    EGD (ESOPHAGOGASTRODUODENOSCOPY) N/A 2/21/2014    Performed by Isaac Tanner [facility] at [facility] ENDO    ENDARTERECTOMY-CAROTID Left 8/18/2017    [facility] by [facility]il [facility] at [facility] OR    FOOT AMPUTATION THROUGH METATARSAL  1996    left    FOOT SURGERY Bilateral 1980's    per patient multiple toe amputations prior to.  partial foot amputation:first great toe then other toes     KIDNEY SURGERY  2014    per patient separation of horseshoe kidney @ time of AAA repair    LUNG LOBECTOMY Right 1970s    per patient not cancer    PYELOGRAM-RETROGRADE Left 5/4/2017    Performed by Scooter MENESES  Davin NELSON MD at [facility] OR    RESECTION-BOWEL N/A 3/16/2014    Performed by Stefan [facility]il [facility] at [facility] OR    right below knee amputation  2009 (approx)    SMALL INTESTINE SURGERY  2014    per patient partial @ time of aaa repair  not small bowel - large bowel bowel compromised byerickmarin AAAbowel    TONSILLECTOMY  1955 aprox    URETERAL STENT PLACEMENT Left     annually replaced since 2012 or so  Dr Jin     Current Outpatient Medications on File Prior to Visit   Medication Sig Dispense Refill    amLODIPine (NORVASC) 10 MG tablet TAKE 1 TABLET EVERY DAY 90 tablet 3    aspirin (ECOTRIN) 81 MG EC tablet Take 1 tablet (81 mg total) by mouth once daily.  0    carvedilol (COREG) 12.5 MG tablet Take 1 tablet (12.5 mg total) by mouth 2 (two) times daily with meals. 180 tablet 3    clopidogrel (PLAVIX) 75 mg tablet TAKE 1 TABLET EVERY DAY 90 tablet 3    docusate sodium (COLACE) 100 MG capsule Take 1 capsule (100 mg total) by mouth 2 (two) times daily. (Patient taking differently: Take 100 mg by mouth as needed. ) 60 capsule 0    isosorbide mononitrate (IMDUR) 60 MG 24 hr tablet Take 1 tablet (60 mg total) by mouth once daily. 30 tablet 11    losartan (COZAAR) 100 MG tablet Take 1 tablet (100 mg total) by mouth once daily. 90 tablet 3    nitroglycerin (NITROSTAT) 0.6 MG Subl Place 1 tablet (0.6 mg total) under the tongue every 5 (five) minutes as needed (max 3/ per episode). 30 tablet 1    omeprazole (PRILOSEC) 20 MG capsule TAKE 1 CAPSULE TWICE DAILY 180 capsule 3    oxyCODONE-acetaminophen (PERCOCET) 5-325 mg per tablet Take 1 tablet by mouth every 6 (six) hours as needed for Pain. 10 tablet 0    pantoprazole (PROTONIX) 40 MG tablet Take 1 tablet (40 mg total) by mouth once daily. 30 tablet 11    pravastatin (PRAVACHOL) 80 MG tablet Take 1 tablet (80 mg total) by mouth every evening. 90 tablet 3    sertraline (ZOLOFT) 50 MG tablet Take 1 tablet (50 mg total) by mouth once daily. (Patient taking  "differently: Take 50 mg by mouth as needed. ) 30 tablet 11    triamcinolone acetonide 0.1% (KENALOG) 0.1 % cream Apply topically 2 (two) times daily. for 10 days 1 Tube 1     No current facility-administered medications on file prior to visit.      Vitals:    09/17/19 1334 09/17/19 1336   BP: (!) 147/66 135/65   BP Location: Right arm Left arm   Patient Position: Sitting Sitting   BP Method: Large (Automatic) Large (Automatic)   Pulse: 77 77   SpO2: 97%    Weight: 87 kg (191 lb 12.8 oz)    Height: 6' 1" (1.854 m)      Body mass index is 25.3 kg/m².  Allergies:     Review of patient's allergies indicates:   Allergen Reactions    Morphine Itching     Medications:     Current Outpatient Medications on File Prior to Visit   Medication Sig Dispense Refill    amLODIPine (NORVASC) 10 MG tablet TAKE 1 TABLET EVERY DAY 90 tablet 3    aspirin (ECOTRIN) 81 MG EC tablet Take 1 tablet (81 mg total) by mouth once daily.  0    carvedilol (COREG) 12.5 MG tablet Take 1 tablet (12.5 mg total) by mouth 2 (two) times daily with meals. 180 tablet 3    clopidogrel (PLAVIX) 75 mg tablet TAKE 1 TABLET EVERY DAY 90 tablet 3    docusate sodium (COLACE) 100 MG capsule Take 1 capsule (100 mg total) by mouth 2 (two) times daily. (Patient taking differently: Take 100 mg by mouth as needed. ) 60 capsule 0    isosorbide mononitrate (IMDUR) 60 MG 24 hr tablet Take 1 tablet (60 mg total) by mouth once daily. 30 tablet 11    losartan (COZAAR) 100 MG tablet Take 1 tablet (100 mg total) by mouth once daily. 90 tablet 3    nitroglycerin (NITROSTAT) 0.6 MG Subl Place 1 tablet (0.6 mg total) under the tongue every 5 (five) minutes as needed (max 3/ per episode). 30 tablet 1    omeprazole (PRILOSEC) 20 MG capsule TAKE 1 CAPSULE TWICE DAILY 180 capsule 3    oxyCODONE-acetaminophen (PERCOCET) 5-325 mg per tablet Take 1 tablet by mouth every 6 (six) hours as needed for Pain. 10 tablet 0    pantoprazole (PROTONIX) 40 MG tablet Take 1 tablet (40 mg " "total) by mouth once daily. 30 tablet 11    pravastatin (PRAVACHOL) 80 MG tablet Take 1 tablet (80 mg total) by mouth every evening. 90 tablet 3    sertraline (ZOLOFT) 50 MG tablet Take 1 tablet (50 mg total) by mouth once daily. (Patient taking differently: Take 50 mg by mouth as needed. ) 30 tablet 11    triamcinolone acetonide 0.1% (KENALOG) 0.1 % cream Apply topically 2 (two) times daily. for 10 days 1 Tube 1     No current facility-administered medications on file prior to visit.      Social History:     Social History     Tobacco Use    Smoking status: Former Smoker     Packs/day: 1.00     Years: 15.00     Pack years: 15.00     Last attempt to quit: 1/1/2009     Years since quitting: 10.7    Smokeless tobacco: Never Used   Substance Use Topics    Alcohol use: No     Family History:     Family History   Problem Relation Age of Onset    Cancer Mother         lung    COPD Mother     Heart disease Father         MI but per patient bc of old age    Diabetes Daughter     Eczema Neg Hx     Lupus Neg Hx     Psoriasis Neg Hx     Melanoma Neg Hx     Kidney disease Neg Hx     Stroke Neg Hx     Mental illness Neg Hx     Mental retardation Neg Hx     Hypertension Neg Hx     Hyperlipidemia Neg Hx     Drug abuse Neg Hx     Alcohol abuse Neg Hx     Depression Neg Hx      Physical Exam:     Vitals:    09/17/19 1334 09/17/19 1336   BP: (!) 147/66 135/65   BP Location: Right arm Left arm   Patient Position: Sitting Sitting   BP Method: Large (Automatic) Large (Automatic)   Pulse: 77 77   SpO2: 97%    Weight: 87 kg (191 lb 12.8 oz)    Height: 6' 1" (1.854 m)          Constitutional: No apparent distress, conversant  HEENT: Sclera anicteric, extraocular movements intact  Neck: No jugular venous distension, no carotid bruits  CV: Regular rate and rhythm, no murmurs rubs or gallops, normal S1/S2  Pulm: Clear to auscultation bilaterally, no wheezes, rales, or ronchi  GI: Abdomen soft, nontender, nondistended, " normoactive bowel sounds  Extremities:  As mentioned in HPI, patient has 1+ bilateral femoral pulses, 1+ left popliteal pusle.  Skin: No ecchymosis, erythema, or ulcers  Psych: Alert and oriented to person place location, appropriate affect  Neuro: No focal deficits    Labs:     Lab Results   Component Value Date     04/17/2019    K 5.0 04/17/2019     04/17/2019    CO2 22 (L) 04/17/2019    BUN 21 04/17/2019    CREATININE 2.1 (H) 04/17/2019    ANIONGAP 9 04/17/2019     Lab Results   Component Value Date    AST 20 03/27/2019    ALT 12 03/27/2019    ALKPHOS 173 (H) 03/27/2019    BILITOT 0.4 03/27/2019    ALBUMIN 2.8 (L) 03/27/2019     Lab Results   Component Value Date    CALCIUM 9.4 04/17/2019    MG 1.2 (L) 03/27/2019    PHOS 3.0 03/27/2019     Lab Results   Component Value Date     (H) 03/27/2019     (H) 02/26/2019    BNP 43 06/27/2016    Lab Results   Component Value Date    WBC 13.66 (H) 04/04/2019    HGB 8.4 (L) 04/04/2019    HCT 25.7 (L) 04/04/2019    HCT 28 (L) 03/16/2014     04/04/2019    GRAN 11.8 (H) 04/04/2019    GRAN 86.2 (H) 04/04/2019     Lab Results   Component Value Date    INR 1.1 03/27/2019     Lab Results   Component Value Date    CHOL 71 (L) 03/27/2019    HDL 20 (L) 03/27/2019    LDLCALC 29.8 (L) 03/27/2019    TRIG 106 03/27/2019     Lab Results   Component Value Date    HGBA1C 5.0 03/27/2019     Lab Results   Component Value Date    TSH 1.747 03/27/2019          Assessment:     Patient Active Problem List   Diagnosis    Ureteral stent retained    Hiatal hernia with GERD    Essential hypertension    Idiopathic peripheral neuropathy    Anemia    PVD (peripheral vascular disease)    CKD (chronic kidney disease) stage 3, GFR 30-59 ml/min    Horseshoe kidney    CAD;Old MI ; s/p stents x 3 (2000)     Aortic stenosis    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    Ureteral stricture, left    Coffee ground emesis    Cerebral infarction due to  embolism of left middle cerebral artery    Left carotid artery stenosis    Ureteral stricture    History of transmetatarsal amputation of left foot    Atypical chest pain    History of CVA (cerebrovascular accident)    AP (angina pectoris)    Iron deficiency anemia due to chronic blood loss    Persistent proteinuria    CKD stage G4/A3, GFR 15-29 and albumin creatinine ratio >300 mg/g    Elevated serum immunoglobulin free light chains    Unstable angina    Hypomagnesemia    Acute encephalopathy    Neck pain    Debility    Accelerated hypertension    Analgesic nephropathy    Meningitis       Plan:   Coronary artery disease with recent left heart catheterization showing multivessel disease, patient was deemed to be high risk for CABG per Dr. Ibarra:  We discussed with him the finding of his recent left heart catheterization in details.  Patient has mixed features of angina in addition to possible GI source of pain.  Patient with multiple comorbidities, extensive history of coronary artery disease and PAD in addition to chronic kidney disease with last creatinine at 2.1.  We recommend PET stress to evaluate for ischemia at the same time we will increase his Imdur to 120 mg p.o. Daily.  Will switch the patient to Lipitor 40 mg p.o. Daily.  Will follow-up on the patient after his PET stress to evaluate for his symptoms and to discuss the results with him.    Hypertension:  Advised to monitor his blood pressure at home.  Advised him on healthy diet and exercise involving his upper extremities.    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    Attending addendum to follow     Gwen Hardwick MD  Interventional Cardiovascular Fellow  Pager: [phone]          9/17/2019 2:47 PM    I have personally taken the history and examined this patient and agree with the resident's note as stated above.  1) CAD.  The patient has  multivessel coronary artery disease and reports symptoms consistent with CCS class 3 angina.  The patient was referred for PCI after being turned down for CABG by Dr. Ibarra.  She has a history of CKD 3 which is almost CKD 4 based on 04/17/2019 creatinine.  I discussed multivessel PCI with the patient in detail.  In order to help better guide PCI and thereby limit contrast use will order a PET stress test.  After the PET stress test will discuss PCI further with the patient.  -continue enteric-coated aspirin 81 mg p.o. Q.day  -continue Plavix 75 mg p.o. Q.day  -continue Coreg 12.5 mg p.o. B.i.d.  -continue losartan 100 mg p.o. Q.day  -increase /imdur from 60 mg to 120mg p.o. q.day    2) hypertension.  The patient's blood pressure is inadequately controlled in clinic today.    -continue Nrotjst05 mg p.o. Q.day  -continue Coreg 12.5 mg p.o. B.i.d.  -continue losartan 100 mg p.o. q.day  -increase iImdur dose as above    3) dyslipidemia.  03/27/2019 lipid panel reviewed.  Despite having an excellent TC and  LDL level the patient has multivessel CAD.  Therefore,  rec starting Lipitor 40 mg p.o. Q.day    4) CKD 3.  The patient has CKD 3 and is all CKD 4.  Therefore any angiographic procedures but the patient high risk for acute kidney injury.  I discussed this with the patient in detail.  In order to better plan PCI will get a PET stress test as above.  PCI may need to be staged to limit contrast dosing.  -avoid nephrotoxic medications    5) carotid stenosis.  08/15/2017 carotid duplex reviewed.  The patient is status post left carotid endarterectomy subsequent to that duplex study.  -as stated above will initiate statin therapy  -will discuss repeat carotid duplex with patient during his next clinic visit.    6) history of CVA.  Continue aspirin 81 mg p.o. q.day as stated above will initiate statin therapy for secondary prevention of stroke in addition to CAD prevention    All of the patient's questions were answered.

## 2019-09-17 NOTE — LETTER
September 21, 2019      Brennen Ibarra MD  [address]  [facility] [address]           [facility]  [address]  [facility] [address]  Phone: [phone]          Patient: Kodi Ribeiro   MR Number: [id number]   YOB: 1950   Date of Visit: 9/17/2019       Dear Dr. Brennen Ibarra:    Thank you for referring Kodi Ribeiro to me for evaluation. Attached you will find relevant portions of my assessment and plan of care.    If you have questions, please do not hesitate to call me. I look forward to following Kodi Ribeiro along with you.    Sincerely,    Brendan Mancia MD    Enclosure  CC:  No Recipients    If you would like to receive this communication electronically, please contact [email or web address] or [phone] to request more information on [employer] access.    For providers and/or their staff who would like to refer a patient to [facility], please contact us through our one-stop-shop provider referral line, [facility] , at [phone].    If you feel you have received this communication in error or would no longer like to receive these types of communications, please e-mail [email or web address]

## 2019-10-14 ENCOUNTER — TELEPHONE (OUTPATIENT)
Dept: CARDIOLOGY | Facility: CLINIC | Age: 69
End: 2019-10-14

## 2019-10-16 ENCOUNTER — CLINICAL SUPPORT (OUTPATIENT)
Dept: CARDIOLOGY | Facility: CLINIC | Age: 69
End: 2019-10-16
Attending: STUDENT IN AN ORGANIZED HEALTH CARE EDUCATION/TRAINING PROGRAM
Payer: MEDICARE

## 2019-10-16 VITALS — HEIGHT: 73 IN | BODY MASS INDEX: 25.31 KG/M2 | WEIGHT: 191 LBS

## 2019-10-16 DIAGNOSIS — R07.89 ATYPICAL CHEST PAIN: ICD-10-CM

## 2019-10-16 LAB
CFR FLOW - ANTERIOR: 1.49 CC/MIN/G
CFR FLOW - INFERIOR: 1.51 CC/MIN/G
CFR FLOW - LATERAL: 1.65 CC/MIN/G
CFR FLOW - MAX: 2.2 CC/MIN/G
CFR FLOW - MIN: 1 CC/MIN/G
CFR FLOW - SEPTAL: 1.4 CC/MIN/G
CFR FLOW - WHOLE HEART: 1.51 CC/MIN/G
CFR FLOW- DEFECT 1: 1.64 CC/MIN/G
CFR FLOW- DEFECT 2: 1.41 CC/MIN/G
CV PHARM DOSE: 48.6 MG
CV STRESS BASE HR: 68 BPM
DIASTOLIC BLOOD PRESSURE: 81 MMHG
END DIASTOLIC INDEX-HIGH: 170 ML/M2
END SYSTOLIC INDEX-HIGH: 70 ML/M2
NUC REST DIASTOLIC VOLUME INDEX: 165
NUC REST EJECTION FRACTION: 59
NUC REST SYSTOLIC VOLUME INDEX: 68
NUC STRESS DIASTOLIC VOLUME INDEX: 149
NUC STRESS EJECTION FRACTION: 63 %
NUC STRESS SYSTOLIC VOLUME INDEX: 55
OHS CV CPX 85 PERCENT MAX PREDICTED HEART RATE MALE: 128
OHS CV CPX MAX PREDICTED HEART RATE: 150
OHS CV CPX PATIENT IS FEMALE: 0
OHS CV CPX PATIENT IS MALE: 1
OHS CV CPX PEAK DIASTOLIC BLOOD PRESSURE: 63 MMHG
OHS CV CPX PEAK HEAR RATE: 73 BPM
OHS CV CPX PEAK RATE PRESSURE PRODUCT: NORMAL
OHS CV CPX PEAK SYSTOLIC BLOOD PRESSURE: 153 MMHG
OHS CV CPX PERCENT MAX PREDICTED HEART RATE ACHIEVED: 49
OHS CV CPX RATE PRESSURE PRODUCT PRESENTING: NORMAL
REST FLOW - ANTERIOR: 1.12 CC/MIN/G
REST FLOW - INFERIOR: 1.02 CC/MIN/G
REST FLOW - LATERAL: 1.27 CC/MIN/G
REST FLOW - MAX: 1.6 CC/MIN/G
REST FLOW - MIN: 0.7 CC/MIN/G
REST FLOW - SEPTAL: 0.96 CC/MIN/G
REST FLOW - WHOLE HEART: 1.09
REST FLOW- DEFECT 1: 0.79 CC/MIN/G
REST FLOW- DEFECT 2: 0.93 CC/MIN/G
RETIRED EF AND QEF - SEE NOTES: 51 %
STRESS ECHO TARGET HR: 127.5 BPM
STRESS FLOW - ANTERIOR: 1.67 CC/MIN/G
STRESS FLOW - INFERIOR: 1.52 CC/MIN/G
STRESS FLOW - LATERAL: 2.08 CC/MIN/G
STRESS FLOW - MAX: 2.5 CC/MIN/G
STRESS FLOW - MIN: 1 CC/MIN/G
STRESS FLOW - SEPTAL: 1.32 CC/MIN/G
STRESS FLOW - WHOLE HEART: 1.65 CC/MIN/G
STRESS FLOW- DEFECT 1: 1.29 CC/MIN/G
STRESS FLOW- DEFECT 2: 1.29 CC/MIN/G
SYSTOLIC BLOOD PRESSURE: 169 MMHG

## 2019-10-16 PROCEDURE — 78492 CARDIAC PET SCAN STRESS (CUPID ONLY): ICD-10-PCS | Mod: S$GLB,,, | Performed by: INTERNAL MEDICINE

## 2019-10-16 PROCEDURE — 78492 MYOCRD IMG PET MLT RST&STRS: CPT | Mod: S$GLB,,, | Performed by: INTERNAL MEDICINE

## 2019-10-16 PROCEDURE — A9555 CARDIAC PET SCAN STRESS (CUPID ONLY): ICD-10-PCS | Mod: S$GLB,,, | Performed by: INTERNAL MEDICINE

## 2019-10-16 PROCEDURE — 99999 PR PBB SHADOW E&M-EST. PATIENT-LVL I: ICD-10-PCS | Mod: PBBFAC,,,

## 2019-10-16 PROCEDURE — 99999 PR PBB SHADOW E&M-EST. PATIENT-LVL I: CPT | Mod: PBBFAC,,,

## 2019-10-16 PROCEDURE — A9555 RB82 RUBIDIUM: HCPCS | Mod: S$GLB,,, | Performed by: INTERNAL MEDICINE

## 2019-10-16 PROCEDURE — 93015 CARDIAC PET SCAN STRESS (CUPID ONLY): ICD-10-PCS | Mod: S$GLB,,, | Performed by: INTERNAL MEDICINE

## 2019-10-16 PROCEDURE — 93015 CV STRESS TEST SUPVJ I&R: CPT | Mod: S$GLB,,, | Performed by: INTERNAL MEDICINE

## 2019-10-16 RX ORDER — DIPYRIDAMOLE 5 MG/ML
48.6 INJECTION INTRAVENOUS
Status: COMPLETED | OUTPATIENT
Start: 2019-10-16 | End: 2019-10-16

## 2019-10-16 RX ADMIN — DIPYRIDAMOLE 48.6 MG: 5 INJECTION INTRAVENOUS at 01:10

## 2019-10-22 ENCOUNTER — TELEPHONE (OUTPATIENT)
Dept: CARDIOLOGY | Facility: CLINIC | Age: 69
End: 2019-10-22

## 2019-10-22 NOTE — TELEPHONE ENCOUNTER
Notified patient of results of pet stress test. Appointment made in clinic. All questions answered.

## 2019-11-05 ENCOUNTER — PATIENT OUTREACH (OUTPATIENT)
Dept: ADMINISTRATIVE | Facility: OTHER | Age: 69
End: 2019-11-05

## 2019-11-07 ENCOUNTER — OFFICE VISIT (OUTPATIENT)
Dept: CARDIOLOGY | Facility: CLINIC | Age: 69
End: 2019-11-07
Payer: MEDICARE

## 2019-11-07 VITALS
HEART RATE: 71 BPM | WEIGHT: 195.13 LBS | HEIGHT: 73 IN | OXYGEN SATURATION: 97 % | DIASTOLIC BLOOD PRESSURE: 60 MMHG | BODY MASS INDEX: 25.86 KG/M2 | SYSTOLIC BLOOD PRESSURE: 138 MMHG

## 2019-11-07 DIAGNOSIS — I65.23 BILATERAL CAROTID ARTERY STENOSIS: ICD-10-CM

## 2019-11-07 DIAGNOSIS — I10 ESSENTIAL HYPERTENSION: Chronic | ICD-10-CM

## 2019-11-07 DIAGNOSIS — I25.118 CORONARY ARTERY DISEASE OF NATIVE HEART WITH STABLE ANGINA PECTORIS, UNSPECIFIED VESSEL OR LESION TYPE: Primary | ICD-10-CM

## 2019-11-07 PROCEDURE — 99999 PR PBB SHADOW E&M-EST. PATIENT-LVL III: CPT | Mod: PBBFAC,HCNC,, | Performed by: INTERNAL MEDICINE

## 2019-11-07 PROCEDURE — 1101F PR PT FALLS ASSESS DOC 0-1 FALLS W/OUT INJ PAST YR: ICD-10-PCS | Mod: HCNC,CPTII,S$GLB, | Performed by: INTERNAL MEDICINE

## 2019-11-07 PROCEDURE — 99214 PR OFFICE/OUTPT VISIT, EST, LEVL IV, 30-39 MIN: ICD-10-PCS | Mod: HCNC,S$GLB,, | Performed by: INTERNAL MEDICINE

## 2019-11-07 PROCEDURE — 99499 UNLISTED E&M SERVICE: CPT | Mod: HCNC,S$GLB,, | Performed by: INTERNAL MEDICINE

## 2019-11-07 PROCEDURE — 3075F PR MOST RECENT SYSTOLIC BLOOD PRESS GE 130-139MM HG: ICD-10-PCS | Mod: HCNC,CPTII,S$GLB, | Performed by: INTERNAL MEDICINE

## 2019-11-07 PROCEDURE — 99499 RISK ADDL DX/OHS AUDIT: ICD-10-PCS | Mod: HCNC,S$GLB,, | Performed by: INTERNAL MEDICINE

## 2019-11-07 PROCEDURE — 1101F PT FALLS ASSESS-DOCD LE1/YR: CPT | Mod: HCNC,CPTII,S$GLB, | Performed by: INTERNAL MEDICINE

## 2019-11-07 PROCEDURE — 3078F DIAST BP <80 MM HG: CPT | Mod: HCNC,CPTII,S$GLB, | Performed by: INTERNAL MEDICINE

## 2019-11-07 PROCEDURE — 3075F SYST BP GE 130 - 139MM HG: CPT | Mod: HCNC,CPTII,S$GLB, | Performed by: INTERNAL MEDICINE

## 2019-11-07 PROCEDURE — 99214 OFFICE O/P EST MOD 30 MIN: CPT | Mod: HCNC,S$GLB,, | Performed by: INTERNAL MEDICINE

## 2019-11-07 PROCEDURE — 3078F PR MOST RECENT DIASTOLIC BLOOD PRESSURE < 80 MM HG: ICD-10-PCS | Mod: HCNC,CPTII,S$GLB, | Performed by: INTERNAL MEDICINE

## 2019-11-07 PROCEDURE — 99999 PR PBB SHADOW E&M-EST. PATIENT-LVL III: ICD-10-PCS | Mod: PBBFAC,HCNC,, | Performed by: INTERNAL MEDICINE

## 2019-11-07 RX ORDER — CARVEDILOL 25 MG/1
25 TABLET ORAL 2 TIMES DAILY WITH MEALS
Qty: 60 TABLET | Refills: 11 | Status: SHIPPED | OUTPATIENT
Start: 2019-11-07 | End: 2020-10-30

## 2019-11-07 NOTE — PROGRESS NOTES
Cardiology Clinic Note  Reason for Visit: Follow up on CAD    HPI:    Mr. Ross is a pleasant 69-year-old gentleman with past medical history of PVD, CAD referred initially by Dr. Brennen Ibarra for evaluation of coronary artery disease. Patient here for a follow up visit after his recent PET stress.      Patient reports that he has no changes in terms of his chest pain compared to his last clinic visit, he mentioned that he is able to do his daily activities with no chest pain but sometimes during the night when he wakes up he has chest pain that is similar to indigestion that relieved after while and sometimes he has to take sublingual nitro to relieve the pain.    He started to have substernal chest pain radiating to his neck and jaw around October 2018.  Patient had a follow-up with his cardiologist will order SPECT stress that was negative, he had an echocardiogram after that that was show normal ejection fraction at 60%.  Patient was referred to Dr. Adriel Boone for left heart catheterization that was performed on March 7, 2019 and that was showing three-vessel coronary artery disease (critical stenosis at mid LAD, left circumflex, ostial RCA).  Patient then was referred to see Dr. Brennen Ibarra for possible CABG but he could not see him around March as the patient was admitted to the hospital with meningitis and he got treated for that.  He recently saw Dr. Ibarra on September 4, 2019 and he was deemed to be a high risk for CABG.  Around March the patient was started on Imdur 60 mg p.o. daily and PPI for possible GERD.  Patient mentioned that his symptoms improved and he is getting less frequent chest pain, he mentions mostly the chest pain happened at night if he eats late.  He has past medical history of GI bleeding with multiple EGDs and colonoscopies between 2007 through 2014 and has a mention there was no obvious source for bleeding was identified.  He has past medical history of coronary artery  disease status post PCI in 2000, AAA disease status post resection and graft with subsequent MRSA infection status post axilla femoral bypass in 2014, carotid artery disease status post left CEA, CVA, PAD status post right BKA and left forefoot amputation in the past.  Patient also has past medical history of CKD with last creatinine of 2.1, iron deficiency anemia status post IV iron with last hemoglobin at 8.4.    PET stress showed small sized, mild intensity, mid to apical anteroseptal stress induced perfusion abnormality in the distribution of the mid LAD and a small sized, mild intensity, mid to distal inferior an inferoseptal stress induced perfusion abnormality in the distribution of the  RCA territory.    Patient mentioned that he still works and use his wheelchair sometimes but he can walk independently, he mentioned he is able to take care of himself and has a good quality of life.  Today he is here with his wife, he is in good spirits and denies any complaints.    ROS:    Constitution: Negative for fever or chills. Negative for weight loss or gain.   HENT: Negative for sore throat or headaches. Negative for rhinorrhea.  Eyes: Negative for blurred or double vision.   Cardiovascular: See above  Pulmonary: Negative for SOB. Negative for cough.   Gastrointestinal: Negative for abdominal pain. Negative for nausea/ vomiting. Negative for diarrhea.   : Negative for dysuria.   Skin: Negative for rashes.  Neurological: Negative for focal weakness or sensory changes.  Psychological: Negative for depression or anxiety.  PMH:     Past Medical History:   Diagnosis Date    Analgesic nephropathy     Anemia     AP (angina pectoris) 1/11/2019    Arthritis     Colon polyp     Repeat colonoscopy due in 9/14    Coronary artery disease     Diverticulosis     colonoscopy 2/21/2014    Encounter for blood transfusion     GERD (gastroesophageal reflux disease)     Hemorrhoids     colonoscopy 2/21/2014    Horseshoe  kidney     Hyperglycemia 3/17/2014    Hyperlipidemia     Hypertension     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    Late complications of amputation stump     rseolved with further amputation( MRSA then none since 2014)    Lipoma of colon     colonoscopy 2/21/2014    Myocardial infarction     per patient 2000 & 9/2012    Peripheral vascular disease     Phantom limb syndrome     patient reports only intermittent not problematic, not worsening    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    Spinal cord disease     L4L5 disc    Stroke     Tobacco dependence     resolved    Ureteral stent retained      Past Surgical History:   Procedure Laterality Date    ABDOMINAL AORTIC ANEURYSM REPAIR      ABDOMINAL AORTIC ANEURYSM REPAIR  1996/2014    AMPUTATION, LOWER LIMB      AORTA - BILATERAL FEMORAL ARTERY BYPASS GRAFT  2014    Left and right leg    CORONARY ANGIOPLASTY WITH STENT PLACEMENT  2000    Three placed in heart    CYSTOSCOPY W/ RETROGRADES Left 5/29/2018    Procedure: CYSTOSCOPY, WITH RETROGRADE PYELOGRAM;  Surgeon: Scooter Jin IV, MD;  Location: [facility];  Service: Urology;  Laterality: Left;    CYSTOSCOPY W/ URETERAL STENT PLACEMENT Left 5/29/2018    Procedure: CYSTOSCOPY, WITH URETERAL STENT INSERTION;  Surgeon: Scooter Jin IV, MD;  Location: [facility] OR;  Service: Urology;  Laterality: Left;    CYSTOSCOPY W/ URETERAL STENT REMOVAL Left 5/29/2018    Procedure: CYSTOSCOPY, WITH URETERAL STENT REMOVAL;  Surgeon: Scooter Jin IV, MD;  Location: [facility] OR;  Service: Urology;  Laterality: Left;    FOOT AMPUTATION THROUGH METATARSAL  1996    left    FOOT SURGERY Bilateral 1980's    per patient multiple toe amputations prior to.  partial foot amputation:first great toe then other toes     KIDNEY SURGERY  2014    per patient separation of horseshoe kidney @ time of AAA repair    LEFT HEART CATHETERIZATION Left 3/7/2019    Procedure: CATHETERIZATION, HEART, LEFT;  Surgeon: Adriel Boone,  MD;  Location: [facility] CATH LAB;  Service: Cardiology;  Laterality: Left;  630 admit for IV hydration  10am start    LUNG LOBECTOMY Right 1970s    per patient not cancer    right below knee amputation  2009 (approx)    SMALL INTESTINE SURGERY  2014    per patient partial @ time of aaa repair  not small bowel - large bowel bowel compromised byerickmarin AAAbowel    TONSILLECTOMY  1955 aprox    URETERAL STENT PLACEMENT Left     annually replaced since 2012 or so  Dr Jin     Current Outpatient Medications on File Prior to Visit   Medication Sig Dispense Refill    amLODIPine (NORVASC) 10 MG tablet TAKE 1 TABLET EVERY DAY 90 tablet 3    aspirin (ECOTRIN) 81 MG EC tablet Take 1 tablet (81 mg total) by mouth once daily.  0    atorvastatin (LIPITOR) 40 MG tablet Take 1 tablet (40 mg total) by mouth once daily. 90 tablet 3    clopidogrel (PLAVIX) 75 mg tablet TAKE 1 TABLET EVERY DAY 90 tablet 3    docusate sodium (COLACE) 100 MG capsule Take 1 capsule (100 mg total) by mouth 2 (two) times daily. (Patient taking differently: Take 100 mg by mouth as needed. ) 60 capsule 0    isosorbide mononitrate (IMDUR) 60 MG 24 hr tablet Take 2 tablets (120 mg total) by mouth once daily. 60 tablet 11    losartan (COZAAR) 100 MG tablet Take 1 tablet (100 mg total) by mouth once daily. 90 tablet 3    omeprazole (PRILOSEC) 20 MG capsule TAKE 1 CAPSULE TWICE DAILY 180 capsule 3    [DISCONTINUED] carvedilol (COREG) 12.5 MG tablet Take 1 tablet (12.5 mg total) by mouth 2 (two) times daily with meals. 180 tablet 3    nitroglycerin (NITROSTAT) 0.6 MG Subl Place 1 tablet (0.6 mg total) under the tongue every 5 (five) minutes as needed (max 3/ per episode). (Patient not taking: Reported on 11/7/2019) 30 tablet 1    oxyCODONE-acetaminophen (PERCOCET) 5-325 mg per tablet Take 1 tablet by mouth every 6 (six) hours as needed for Pain. (Patient not taking: Reported on 11/7/2019) 10 tablet 0    pantoprazole (PROTONIX) 40 MG tablet Take 1  "tablet (40 mg total) by mouth once daily. (Patient not taking: Reported on 11/7/2019) 30 tablet 11    sertraline (ZOLOFT) 50 MG tablet Take 1 tablet (50 mg total) by mouth once daily. (Patient not taking: Reported on 11/7/2019) 30 tablet 11    triamcinolone acetonide 0.1% (KENALOG) 0.1 % cream Apply topically 2 (two) times daily. for 10 days 1 Tube 1     No current facility-administered medications on file prior to visit.      Vitals:    11/07/19 1326 11/07/19 1329   BP: (!) 152/71 138/60   BP Location: Right arm Left arm   Patient Position: Sitting Sitting   BP Method: Large (Automatic) Large (Automatic)   Pulse: 73 71   SpO2: 97%    Weight: 88.5 kg (195 lb 1.7 oz)    Height: 6' 1" (1.854 m)      Body mass index is 25.74 kg/m².  Allergies:     Review of patient's allergies indicates:   Allergen Reactions    Morphine Itching     Medications:     Current Outpatient Medications on File Prior to Visit   Medication Sig Dispense Refill    amLODIPine (NORVASC) 10 MG tablet TAKE 1 TABLET EVERY DAY 90 tablet 3    aspirin (ECOTRIN) 81 MG EC tablet Take 1 tablet (81 mg total) by mouth once daily.  0    atorvastatin (LIPITOR) 40 MG tablet Take 1 tablet (40 mg total) by mouth once daily. 90 tablet 3    clopidogrel (PLAVIX) 75 mg tablet TAKE 1 TABLET EVERY DAY 90 tablet 3    docusate sodium (COLACE) 100 MG capsule Take 1 capsule (100 mg total) by mouth 2 (two) times daily. (Patient taking differently: Take 100 mg by mouth as needed. ) 60 capsule 0    isosorbide mononitrate (IMDUR) 60 MG 24 hr tablet Take 2 tablets (120 mg total) by mouth once daily. 60 tablet 11    losartan (COZAAR) 100 MG tablet Take 1 tablet (100 mg total) by mouth once daily. 90 tablet 3    omeprazole (PRILOSEC) 20 MG capsule TAKE 1 CAPSULE TWICE DAILY 180 capsule 3    [DISCONTINUED] carvedilol (COREG) 12.5 MG tablet Take 1 tablet (12.5 mg total) by mouth 2 (two) times daily with meals. 180 tablet 3    nitroglycerin (NITROSTAT) 0.6 MG Subl Place " "1 tablet (0.6 mg total) under the tongue every 5 (five) minutes as needed (max 3/ per episode). (Patient not taking: Reported on 11/7/2019) 30 tablet 1    oxyCODONE-acetaminophen (PERCOCET) 5-325 mg per tablet Take 1 tablet by mouth every 6 (six) hours as needed for Pain. (Patient not taking: Reported on 11/7/2019) 10 tablet 0    pantoprazole (PROTONIX) 40 MG tablet Take 1 tablet (40 mg total) by mouth once daily. (Patient not taking: Reported on 11/7/2019) 30 tablet 11    sertraline (ZOLOFT) 50 MG tablet Take 1 tablet (50 mg total) by mouth once daily. (Patient not taking: Reported on 11/7/2019) 30 tablet 11    triamcinolone acetonide 0.1% (KENALOG) 0.1 % cream Apply topically 2 (two) times daily. for 10 days 1 Tube 1     No current facility-administered medications on file prior to visit.      Social History:     Social History     Tobacco Use    Smoking status: Former Smoker     Packs/day: 1.00     Years: 15.00     Pack years: 15.00     Last attempt to quit: 1/1/2009     Years since quitting: 10.8    Smokeless tobacco: Never Used   Substance Use Topics    Alcohol use: No     Family History:     Family History   Problem Relation Age of Onset    Cancer Mother         lung    COPD Mother     Heart disease Father         MI but per patient bc of old age    Diabetes Daughter     Eczema Neg Hx     Lupus Neg Hx     Psoriasis Neg Hx     Melanoma Neg Hx     Kidney disease Neg Hx     Stroke Neg Hx     Mental illness Neg Hx     Mental retardation Neg Hx     Hypertension Neg Hx     Hyperlipidemia Neg Hx     Drug abuse Neg Hx     Alcohol abuse Neg Hx     Depression Neg Hx      Physical Exam:     Vitals:    11/07/19 1326 11/07/19 1329   BP: (!) 152/71 138/60   BP Location: Right arm Left arm   Patient Position: Sitting Sitting   BP Method: Large (Automatic) Large (Automatic)   Pulse: 73 71   SpO2: 97%    Weight: 88.5 kg (195 lb 1.7 oz)    Height: 6' 1" (1.854 m)          Constitutional: No apparent " distress, conversant  HEENT: Sclera anicteric, extraocular movements intact  Neck: No jugular venous distension, no carotid bruits  CV: Regular rate and rhythm, no murmurs rubs or gallops, normal S1/S2  Pulm: Clear to auscultation bilaterally, no wheezes, rales, or ronchi  GI: Abdomen soft, nontender, nondistended, normoactive bowel sounds  Extremities: No lower extremity edema, warm with palpable pulses  Skin: No ecchymosis, erythema, or ulcers  Psych: Alert and oriented to person place location, appropriate affect  Neuro: No focal deficits        Assessment:     Patient Active Problem List   Diagnosis    Ureteral stent retained    Hiatal hernia with GERD    Essential hypertension    Idiopathic peripheral neuropathy    Anemia    PVD (peripheral vascular disease)    CKD (chronic kidney disease) stage 3, GFR 30-59 ml/min    Horseshoe kidney    CAD;Old MI ; s/p stents x 3 (2000)     Aortic stenosis    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    Ureteral stricture, left    Coffee ground emesis    Cerebral infarction due to embolism of left middle cerebral artery    Left carotid artery stenosis    Ureteral stricture    History of transmetatarsal amputation of left foot    Atypical chest pain    History of CVA (cerebrovascular accident)    AP (angina pectoris)    Iron deficiency anemia due to chronic blood loss    Persistent proteinuria    CKD stage G4/A3, GFR 15-29 and albumin creatinine ratio >300 mg/g    Elevated serum immunoglobulin free light chains    Unstable angina    Hypomagnesemia    Acute encephalopathy    Neck pain    Debility    Accelerated hypertension    Analgesic nephropathy    Meningitis       Plan:     Coronary artery disease with recent left heart catheterization showing multivessel disease, patient was deemed to be high risk for CABG per Dr. Ibarra:    Patient with multiple comorbidities, extensive history of coronary artery disease and PAD in addition  to chronic kidney disease with last creatinine at 2.1.  We had a detailed discussion about the patient's angiogram that was performed and correlation with his symptoms in addition to the finding of that stress.  We also discussed in detail PCI as well as medical management for the patient. The patient decided that he would like to optimize his medical management and defer PCI at this time.  We will increase his Coreg to 25 mg p.o. B.i.d.,given that his heart rate still in the 70s, will continue on the Imdur 120 mg p.o. Daily.  Continue enteric-coated aspirin 81 mg p.o. Q.day  Continue Plavix 75 mg p.o. q.day  If the patient experiences angina that is not relieved by his nitroglycerin spray within 5 min he was instructed to call 911    Will follow-up on the patient in 2 months to re-evaluate his symptoms.       Hypertension:  The patient's blood pressure was elevated in clinic today  Advised to monitor his blood pressure at home.  Advised him on healthy diet and exercise involving his upper extremities.     PAD:    The patient was advised to start a walking program for PD as long as he is not experiencing angina.  Continue high-intensity statin.      Carotid artery stenosis status post left carotid endarterectomy    As mentioned earlier with continue high-intensity statin.  Will repeat carotid ultrasound and will follow-up on the results.      Attending addendum to follow     Gwen Hardwick MD  Interventional Cardiovascular Fellow  Pager: [phone]          11/7/2019 1:29 PM  I have personally taken the history and examined this patient and agree with the resident's note as stated above.    1) CAD.  The patient has multivessel coronary artery disease and reported symptoms consistent with CCS class 3 angina in the past.  He now has atypical chest pain and to perfusion abnormalities on his 10/16/2019 PET stress test that were described by the interpreting nuclear cardiologist as mild.   I discussed medical management  versus medical management versus PCI with the patient in detail.  The patient elected to defer PCI at this time.  He stated that he would like to work on optimizing his medical management.  -continue enteric-coated aspirin 81 mg p.o. Q.day  -continue Plavix 75 mg p.o. Q.day  -increase Coreg from 12.5 mg p.o. b.i.d. to 25 mg p.o. b.i.d.  -continue losartan 100 mg p.o. Q.day  -continue Imdur 120 mg p.o. q.day   -CAD risk factor reduction  -recommend a heart healthy diet    2) hypertension.  The patient's blood pressure is inadequately controlled in clinic today.    -continue Gykghse18 mg p.o. Q.day  -continue losartan 100 mg p.o. q.day  -continue Imdur 120 mg p.o. Q.day  -increase Coreg to 25 mg p.o. b.i.d.     3) dyslipidemia.  03/27/2019 lipid panel reviewed.  Despite having an excellent TC and  LDL level the patient has multivessel CAD.   -continue Lipitor 40 mg p.o. Q.day  -recommend heart healthy diet     4) CKD 3.  The patient has CKD 3 and is almost at CKD 4.  Therefore any angiographic procedures but the patient high risk for acute kidney injury.   -avoid nephrotoxic medications     5) carotid stenosis.  08/15/2017 carotid duplex reviewed.  The patient is status post left carotid endarterectomy subsequent to that duplex study.  -as stated above will initiate statin therapy  -will order carotid duplex      6) history of CVA.  Continue aspirin 81 mg p.o. q.day as stated above will initiate statin therapy for secondary prevention of stroke in addition to CAD prevention     All of the patient's questions were answered.

## 2019-11-08 ENCOUNTER — IMMUNIZATION (OUTPATIENT)
Dept: FAMILY MEDICINE | Facility: CLINIC | Age: 69
End: 2019-11-08
Payer: MEDICARE

## 2019-11-08 PROCEDURE — G0008 FLU VACCINE - HIGH DOSE (65+) PRESERVATIVE FREE IM: ICD-10-PCS | Mod: HCNC,S$GLB,, | Performed by: FAMILY MEDICINE

## 2019-11-08 PROCEDURE — 90662 IIV NO PRSV INCREASED AG IM: CPT | Mod: HCNC,S$GLB,, | Performed by: FAMILY MEDICINE

## 2019-11-08 PROCEDURE — G0008 ADMIN INFLUENZA VIRUS VAC: HCPCS | Mod: HCNC,S$GLB,, | Performed by: FAMILY MEDICINE

## 2019-11-08 PROCEDURE — 90662 FLU VACCINE - HIGH DOSE (65+) PRESERVATIVE FREE IM: ICD-10-PCS | Mod: HCNC,S$GLB,, | Performed by: FAMILY MEDICINE

## 2019-11-14 ENCOUNTER — LAB VISIT (OUTPATIENT)
Dept: LAB | Facility: HOSPITAL | Age: 69
End: 2019-11-14
Attending: FAMILY MEDICINE
Payer: MEDICARE

## 2019-11-14 ENCOUNTER — OFFICE VISIT (OUTPATIENT)
Dept: FAMILY MEDICINE | Facility: CLINIC | Age: 69
End: 2019-11-14
Payer: MEDICARE

## 2019-11-14 VITALS
DIASTOLIC BLOOD PRESSURE: 60 MMHG | SYSTOLIC BLOOD PRESSURE: 128 MMHG | WEIGHT: 190.38 LBS | RESPIRATION RATE: 20 BRPM | HEART RATE: 66 BPM | HEIGHT: 73 IN | TEMPERATURE: 98 F | OXYGEN SATURATION: 97 % | BODY MASS INDEX: 25.23 KG/M2

## 2019-11-14 DIAGNOSIS — N18.30 CKD (CHRONIC KIDNEY DISEASE) STAGE 3, GFR 30-59 ML/MIN: Chronic | ICD-10-CM

## 2019-11-14 DIAGNOSIS — I10 ESSENTIAL HYPERTENSION: Chronic | ICD-10-CM

## 2019-11-14 DIAGNOSIS — L02.92 BOIL: Primary | ICD-10-CM

## 2019-11-14 DIAGNOSIS — L02.92 BOIL: ICD-10-CM

## 2019-11-14 LAB
ALBUMIN SERPL BCP-MCNC: 3.3 G/DL (ref 3.5–5.2)
ALP SERPL-CCNC: 204 U/L (ref 55–135)
ALT SERPL W/O P-5'-P-CCNC: <5 U/L (ref 10–44)
ANION GAP SERPL CALC-SCNC: 8 MMOL/L (ref 8–16)
AST SERPL-CCNC: 9 U/L (ref 10–40)
BASOPHILS # BLD AUTO: 0.05 K/UL (ref 0–0.2)
BASOPHILS NFR BLD: 1 % (ref 0–1.9)
BILIRUB SERPL-MCNC: 0.4 MG/DL (ref 0.1–1)
BUN SERPL-MCNC: 25 MG/DL (ref 8–23)
CALCIUM SERPL-MCNC: 8.2 MG/DL (ref 8.7–10.5)
CHLORIDE SERPL-SCNC: 114 MMOL/L (ref 95–110)
CHOLEST SERPL-MCNC: 97 MG/DL (ref 120–199)
CHOLEST/HDLC SERPL: 3.6 {RATIO} (ref 2–5)
CO2 SERPL-SCNC: 18 MMOL/L (ref 23–29)
CREAT SERPL-MCNC: 2.4 MG/DL (ref 0.5–1.4)
DIFFERENTIAL METHOD: ABNORMAL
EOSINOPHIL # BLD AUTO: 0.2 K/UL (ref 0–0.5)
EOSINOPHIL NFR BLD: 4.4 % (ref 0–8)
ERYTHROCYTE [DISTWIDTH] IN BLOOD BY AUTOMATED COUNT: 13.2 % (ref 11.5–14.5)
EST. GFR  (AFRICAN AMERICAN): 30.5 ML/MIN/1.73 M^2
EST. GFR  (NON AFRICAN AMERICAN): 26.3 ML/MIN/1.73 M^2
GLUCOSE SERPL-MCNC: 89 MG/DL (ref 70–110)
HCT VFR BLD AUTO: 29.2 % (ref 40–54)
HDLC SERPL-MCNC: 27 MG/DL (ref 40–75)
HDLC SERPL: 27.8 % (ref 20–50)
HGB BLD-MCNC: 9 G/DL (ref 14–18)
IMM GRANULOCYTES # BLD AUTO: 0.02 K/UL (ref 0–0.04)
IMM GRANULOCYTES NFR BLD AUTO: 0.4 % (ref 0–0.5)
LDLC SERPL CALC-MCNC: 51.4 MG/DL (ref 63–159)
LYMPHOCYTES # BLD AUTO: 1.2 K/UL (ref 1–4.8)
LYMPHOCYTES NFR BLD: 22.2 % (ref 18–48)
MCH RBC QN AUTO: 28.5 PG (ref 27–31)
MCHC RBC AUTO-ENTMCNC: 30.8 G/DL (ref 32–36)
MCV RBC AUTO: 92 FL (ref 82–98)
MONOCYTES # BLD AUTO: 0.4 K/UL (ref 0.3–1)
MONOCYTES NFR BLD: 8.3 % (ref 4–15)
NEUTROPHILS # BLD AUTO: 3.3 K/UL (ref 1.8–7.7)
NEUTROPHILS NFR BLD: 63.7 % (ref 38–73)
NONHDLC SERPL-MCNC: 70 MG/DL
NRBC BLD-RTO: 0 /100 WBC
PLATELET # BLD AUTO: 260 K/UL (ref 150–350)
PMV BLD AUTO: 10.4 FL (ref 9.2–12.9)
POTASSIUM SERPL-SCNC: 4.4 MMOL/L (ref 3.5–5.1)
PROT SERPL-MCNC: 7.8 G/DL (ref 6–8.4)
RBC # BLD AUTO: 3.16 M/UL (ref 4.6–6.2)
SODIUM SERPL-SCNC: 140 MMOL/L (ref 136–145)
TRIGL SERPL-MCNC: 93 MG/DL (ref 30–150)
WBC # BLD AUTO: 5.18 K/UL (ref 3.9–12.7)

## 2019-11-14 PROCEDURE — 80061 LIPID PANEL: CPT | Mod: HCNC

## 2019-11-14 PROCEDURE — 85025 COMPLETE CBC W/AUTO DIFF WBC: CPT | Mod: HCNC

## 2019-11-14 PROCEDURE — 3074F PR MOST RECENT SYSTOLIC BLOOD PRESSURE < 130 MM HG: ICD-10-PCS | Mod: HCNC,CPTII,S$GLB, | Performed by: FAMILY MEDICINE

## 2019-11-14 PROCEDURE — 1101F PR PT FALLS ASSESS DOC 0-1 FALLS W/OUT INJ PAST YR: ICD-10-PCS | Mod: HCNC,CPTII,S$GLB, | Performed by: FAMILY MEDICINE

## 2019-11-14 PROCEDURE — 1101F PT FALLS ASSESS-DOCD LE1/YR: CPT | Mod: HCNC,CPTII,S$GLB, | Performed by: FAMILY MEDICINE

## 2019-11-14 PROCEDURE — 99214 PR OFFICE/OUTPT VISIT, EST, LEVL IV, 30-39 MIN: ICD-10-PCS | Mod: HCNC,S$GLB,, | Performed by: FAMILY MEDICINE

## 2019-11-14 PROCEDURE — 80053 COMPREHEN METABOLIC PANEL: CPT | Mod: HCNC

## 2019-11-14 PROCEDURE — 3078F DIAST BP <80 MM HG: CPT | Mod: HCNC,CPTII,S$GLB, | Performed by: FAMILY MEDICINE

## 2019-11-14 PROCEDURE — 99999 PR PBB SHADOW E&M-EST. PATIENT-LVL III: ICD-10-PCS | Mod: PBBFAC,HCNC,, | Performed by: FAMILY MEDICINE

## 2019-11-14 PROCEDURE — 99999 PR PBB SHADOW E&M-EST. PATIENT-LVL III: CPT | Mod: PBBFAC,HCNC,, | Performed by: FAMILY MEDICINE

## 2019-11-14 PROCEDURE — 36415 COLL VENOUS BLD VENIPUNCTURE: CPT | Mod: HCNC,PO

## 2019-11-14 PROCEDURE — 3074F SYST BP LT 130 MM HG: CPT | Mod: HCNC,CPTII,S$GLB, | Performed by: FAMILY MEDICINE

## 2019-11-14 PROCEDURE — 99214 OFFICE O/P EST MOD 30 MIN: CPT | Mod: HCNC,S$GLB,, | Performed by: FAMILY MEDICINE

## 2019-11-14 PROCEDURE — 3078F PR MOST RECENT DIASTOLIC BLOOD PRESSURE < 80 MM HG: ICD-10-PCS | Mod: HCNC,CPTII,S$GLB, | Performed by: FAMILY MEDICINE

## 2019-11-14 RX ORDER — SULFAMETHOXAZOLE AND TRIMETHOPRIM 800; 160 MG/1; MG/1
1 TABLET ORAL 2 TIMES DAILY
Qty: 14 TABLET | Refills: 0 | Status: SHIPPED | OUTPATIENT
Start: 2019-11-14 | End: 2019-11-14 | Stop reason: SDUPTHER

## 2019-11-14 RX ORDER — MUPIROCIN 20 MG/G
OINTMENT TOPICAL 3 TIMES DAILY
Qty: 1 TUBE | Refills: 2 | Status: SHIPPED | OUTPATIENT
Start: 2019-11-14 | End: 2019-11-14 | Stop reason: SDUPTHER

## 2019-11-14 RX ORDER — SULFAMETHOXAZOLE AND TRIMETHOPRIM 800; 160 MG/1; MG/1
1 TABLET ORAL 2 TIMES DAILY
Qty: 14 TABLET | Refills: 0 | Status: SHIPPED | OUTPATIENT
Start: 2019-11-14 | End: 2019-11-21

## 2019-11-14 RX ORDER — MUPIROCIN 20 MG/G
OINTMENT TOPICAL 3 TIMES DAILY
Qty: 1 TUBE | Refills: 2 | Status: SHIPPED | OUTPATIENT
Start: 2019-11-14 | End: 2022-01-01 | Stop reason: ALTCHOICE

## 2019-11-14 NOTE — PROGRESS NOTES
Chief Complaint:    Chief Complaint   Patient presents with    Recurrent Skin Infections     R arm, blood work       History of Present Illness:    Patient is here for follow-up:  Presents today because got a boil on his arm is had several of these come and go.  Has a long history with staff  History of CABG following with Cardiology [facility] his significant stenosis of the heart  Also has chronic kidney disease.  Blood pressure stable today    ROS:  Review of Systems   Constitutional: Negative for activity change, chills, fatigue, fever and unexpected weight change.   HENT: Negative for congestion, ear discharge, ear pain, hearing loss, postnasal drip and rhinorrhea.    Eyes: Negative for pain and visual disturbance.   Respiratory: Negative for cough, chest tightness and shortness of breath.    Cardiovascular: Negative for chest pain and palpitations.   Gastrointestinal: Negative for abdominal pain, diarrhea and vomiting.   Endocrine: Negative for heat intolerance.   Genitourinary: Negative for dysuria, flank pain, frequency and hematuria.   Musculoskeletal: Negative for back pain, gait problem and neck pain.   Skin: Negative for color change and rash.   Neurological: Negative for dizziness, tremors, seizures, numbness and headaches.   Psychiatric/Behavioral: Negative for agitation, hallucinations, self-injury, sleep disturbance and suicidal ideas. The patient is not nervous/anxious.        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    Late  complications of amputation stump     rseolved with further amputation( MRSA then none since 2014)    Lipoma of colon     colonoscopy 2/21/2014    Myocardial infarction     per patient 2000 & 9/2012    Peripheral vascular disease     Phantom limb syndrome     patient reports only intermittent not problematic, not worsening    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    Spinal cord disease     L4L5 disc    Stroke     Tobacco dependence     resolved    Ureteral stent retained        Social History:  Social History     Socioeconomic History    Marital status:      Spouse name: Laury    Number of children: 2    Years of education: Not on file    Highest education level: Not on file   Occupational History    Occupation: Retired      Comment: [employer]   Social Needs    Financial resource strain: Not on file    Food insecurity:     Worry: Not on file     Inability: Not on file    Transportation needs:     Medical: Not on file     Non-medical: Not on file   Tobacco Use    Smoking status: Former Smoker     Packs/day: 1.00     Years: 15.00     Pack years: 15.00     Last attempt to quit: 1/1/2009     Years since quitting: 10.8    Smokeless tobacco: Never Used   Substance and Sexual Activity    Alcohol use: No    Drug use: No     Comment: Is on prescription opiod, no non prescribed use    Sexual activity: Not Currently     Partners: Female   Lifestyle    Physical activity:     Days per week: Not on file     Minutes per session: Not on file    Stress: Not on file   Relationships    Social connections:     Talks on phone: Not on file     Gets together: Not on file     Attends Sikhism service: Not on file     Active member of club or organization: Not on file     Attends meetings of clubs or organizations: Not on file     Relationship status: Not on file   Other Topics Concern    Not on file   Social History Narrative     . 4 children alive and well.  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  Still drives. Does not have a Living Will.       Family History:   family history includes COPD in his mother; Cancer in his mother; Diabetes in his daughter; Heart disease in his father.    Health Maintenance   Topic Date Due    Colonoscopy  02/21/2019    Lipid Panel  03/27/2020    High Dose Statin  11/14/2020    TETANUS VACCINE  12/04/2024    Hepatitis C Screening  Completed    Pneumococcal Vaccine (65+ High/Highest Risk)  Completed    Abdominal Aortic Aneurysm Screening  Addressed       Physical Exam:    Vital Signs  Temp: 98.1 °F (36.7 °C)  Temp src: Temporal  Pulse: 66  Resp: 20  SpO2: 97 %  BP: 128/60  BP Location: Left arm  Patient Position: Sitting  Pain Score: 0-No pain  Height and Weight  Height: 6' 1" (185.4 cm)  Weight: 86.4 kg (190 lb 5.9 oz)  BSA (Calculated - sq m): 2.11 sq meters  BMI (Calculated): 25.1  Weight in (lb) to have BMI = 25: 189.1]    Body mass index is 25.12 kg/m².    Physical Exam   Constitutional: He is oriented to person, place, and time. He appears well-developed.   HENT:   Mouth/Throat: Oropharynx is clear and moist.   Eyes: Pupils are equal, round, and reactive to light. Conjunctivae are normal.   Neck: Normal range of motion. Neck supple.   Cardiovascular: Normal rate, regular rhythm and normal heart sounds.   No murmur heard.  Pulmonary/Chest: Effort normal and breath sounds normal. No respiratory distress. He has no wheezes. He has no rales. He exhibits no tenderness.   Abdominal: Soft. He exhibits no distension and no mass. There is no tenderness. There is no guarding.   Musculoskeletal: He exhibits no edema or tenderness.   Lymphadenopathy:     He has no cervical adenopathy.   Neurological: He is alert and oriented to person, place, and time. He has normal reflexes.   Skin: Skin is warm and dry.        Psychiatric: He has a normal mood and affect. His behavior is normal. Judgment and thought " content normal.         Assessment:      ICD-10-CM ICD-9-CM   1. Boil L02.92 680.9   2. CKD (chronic kidney disease) stage 3, GFR 30-59 ml/min N18.3 585.3   3. Essential hypertension I10 401.9         Plan:    Start on Bactrim for the boils,General staff related precautions given also recommend Bactroban to use nasally and on his sore  Other medical problems stable check will      Orders Placed This Encounter   Procedures    CBC auto differential    Comprehensive metabolic panel    Lipid panel       Current Outpatient Medications   Medication Sig Dispense Refill    amLODIPine (NORVASC) 10 MG tablet TAKE 1 TABLET EVERY DAY 90 tablet 3    aspirin (ECOTRIN) 81 MG EC tablet Take 1 tablet (81 mg total) by mouth once daily.  0    atorvastatin (LIPITOR) 40 MG tablet Take 1 tablet (40 mg total) by mouth once daily. 90 tablet 3    carvedilol (COREG) 25 MG tablet Take 1 tablet (25 mg total) by mouth 2 (two) times daily with meals. 60 tablet 11    clopidogrel (PLAVIX) 75 mg tablet TAKE 1 TABLET EVERY DAY 90 tablet 3    isosorbide mononitrate (IMDUR) 60 MG 24 hr tablet Take 2 tablets (120 mg total) by mouth once daily. 60 tablet 11    losartan (COZAAR) 100 MG tablet Take 1 tablet (100 mg total) by mouth once daily. 90 tablet 3    nitroglycerin (NITROSTAT) 0.6 MG Subl Place 1 tablet (0.6 mg total) under the tongue every 5 (five) minutes as needed (max 3/ per episode). 30 tablet 1    omeprazole (PRILOSEC) 20 MG capsule TAKE 1 CAPSULE TWICE DAILY 180 capsule 3    oxyCODONE-acetaminophen (PERCOCET) 5-325 mg per tablet Take 1 tablet by mouth every 6 (six) hours as needed for Pain. 10 tablet 0    triamcinolone acetonide 0.1% (KENALOG) 0.1 % cream Apply topically 2 (two) times daily. for 10 days 1 Tube 1    docusate sodium (COLACE) 100 MG capsule Take 1 capsule (100 mg total) by mouth 2 (two) times daily. (Patient not taking: Reported on 11/14/2019) 60 capsule 0    mupirocin (BACTROBAN) 2 % ointment Apply topically 3  (three) times daily. 1 Tube 2    pantoprazole (PROTONIX) 40 MG tablet Take 1 tablet (40 mg total) by mouth once daily. (Patient not taking: Reported on 11/7/2019) 30 tablet 11    sertraline (ZOLOFT) 50 MG tablet Take 1 tablet (50 mg total) by mouth once daily. (Patient not taking: Reported on 11/7/2019) 30 tablet 11    sulfamethoxazole-trimethoprim 800-160mg (BACTRIM DS) 800-160 mg Tab Take 1 tablet by mouth 2 (two) times daily. for 7 days 14 tablet 0     No current facility-administered medications for this visit.        Medications Discontinued During This Encounter   Medication Reason    sulfamethoxazole-trimethoprim 800-160mg (BACTRIM DS) 800-160 mg Tab Reorder    mupirocin (BACTROBAN) 2 % ointment Reorder       No follow-ups on file.      Norma Nuñez MD

## 2020-01-13 ENCOUNTER — HOSPITAL ENCOUNTER (INPATIENT)
Facility: HOSPITAL | Age: 70
LOS: 2 days | Discharge: HOME | DRG: 291 | End: 2020-01-15
Attending: EMERGENCY MEDICINE | Admitting: INTERNAL MEDICINE
Payer: MEDICARE

## 2020-01-13 DIAGNOSIS — R06.02 SOB (SHORTNESS OF BREATH): ICD-10-CM

## 2020-01-13 DIAGNOSIS — N28.9 RENAL DYSFUNCTION: ICD-10-CM

## 2020-01-13 DIAGNOSIS — I50.9 ACUTE ON CHRONIC CONGESTIVE HEART FAILURE, UNSPECIFIED HEART FAILURE TYPE: ICD-10-CM

## 2020-01-13 DIAGNOSIS — R09.02 HYPOXEMIA: Primary | ICD-10-CM

## 2020-01-13 PROBLEM — G93.40 ACUTE ENCEPHALOPATHY: Status: RESOLVED | Noted: 2019-03-27 | Resolved: 2020-01-13

## 2020-01-13 PROBLEM — Z86.73 HISTORY OF CVA (CEREBROVASCULAR ACCIDENT): Status: RESOLVED | Noted: 2019-01-11 | Resolved: 2020-01-13

## 2020-01-13 PROBLEM — I20.9 AP (ANGINA PECTORIS): Status: RESOLVED | Noted: 2019-01-11 | Resolved: 2020-01-13

## 2020-01-13 PROBLEM — I63.412 CEREBRAL INFARCTION DUE TO EMBOLISM OF LEFT MIDDLE CEREBRAL ARTERY: Status: RESOLVED | Noted: 2017-08-15 | Resolved: 2020-01-13

## 2020-01-13 PROBLEM — R07.89 ATYPICAL CHEST PAIN: Status: RESOLVED | Noted: 2018-12-07 | Resolved: 2020-01-13

## 2020-01-13 PROBLEM — M54.2 NECK PAIN: Status: RESOLVED | Noted: 2019-03-27 | Resolved: 2020-01-13

## 2020-01-13 PROBLEM — G03.9 MENINGITIS: Status: RESOLVED | Noted: 2019-03-29 | Resolved: 2020-01-13

## 2020-01-13 PROBLEM — R53.81 DEBILITY: Status: RESOLVED | Noted: 2019-03-27 | Resolved: 2020-01-13

## 2020-01-13 PROBLEM — K92.0 COFFEE GROUND EMESIS: Status: RESOLVED | Noted: 2017-08-11 | Resolved: 2020-01-13

## 2020-01-13 LAB
ALBUMIN SERPL BCP-MCNC: 3.7 G/DL (ref 3.5–5.2)
ALLENS TEST: ABNORMAL
ALP SERPL-CCNC: 187 U/L (ref 55–135)
ALT SERPL W/O P-5'-P-CCNC: 8 U/L (ref 10–44)
ANION GAP SERPL CALC-SCNC: 13 MMOL/L (ref 8–16)
AST SERPL-CCNC: 10 U/L (ref 10–40)
BASOPHILS # BLD AUTO: 0.09 K/UL (ref 0–0.2)
BASOPHILS NFR BLD: 1 % (ref 0–1.9)
BILIRUB SERPL-MCNC: 0.5 MG/DL (ref 0.1–1)
BNP SERPL-MCNC: 965 PG/ML (ref 0–99)
BUN SERPL-MCNC: 22 MG/DL (ref 8–23)
CALCIUM SERPL-MCNC: 7.6 MG/DL (ref 8.7–10.5)
CHLORIDE SERPL-SCNC: 114 MMOL/L (ref 95–110)
CO2 SERPL-SCNC: 13 MMOL/L (ref 23–29)
CREAT SERPL-MCNC: 2.4 MG/DL (ref 0.5–1.4)
DELSYS: ABNORMAL
DIFFERENTIAL METHOD: ABNORMAL
EOSINOPHIL # BLD AUTO: 0.3 K/UL (ref 0–0.5)
EOSINOPHIL NFR BLD: 3 % (ref 0–8)
EP: 6
ERYTHROCYTE [DISTWIDTH] IN BLOOD BY AUTOMATED COUNT: 15.2 % (ref 11.5–14.5)
ERYTHROCYTE [SEDIMENTATION RATE] IN BLOOD BY WESTERGREN METHOD: 16 MM/H
EST. GFR  (AFRICAN AMERICAN): 30 ML/MIN/1.73 M^2
EST. GFR  (NON AFRICAN AMERICAN): 26 ML/MIN/1.73 M^2
FIO2: 50
GLUCOSE SERPL-MCNC: 167 MG/DL (ref 70–110)
HCO3 UR-SCNC: 15.4 MMOL/L (ref 24–28)
HCT VFR BLD AUTO: 30.3 % (ref 40–54)
HCV AB SERPL QL IA: NEGATIVE
HGB BLD-MCNC: 9.1 G/DL (ref 14–18)
IMM GRANULOCYTES # BLD AUTO: 0.03 K/UL (ref 0–0.04)
IMM GRANULOCYTES NFR BLD AUTO: 0.3 % (ref 0–0.5)
IP: 14
LYMPHOCYTES # BLD AUTO: 1.4 K/UL (ref 1–4.8)
LYMPHOCYTES NFR BLD: 15.7 % (ref 18–48)
MCH RBC QN AUTO: 27.7 PG (ref 27–31)
MCHC RBC AUTO-ENTMCNC: 30 G/DL (ref 32–36)
MCV RBC AUTO: 92 FL (ref 82–98)
MODE: ABNORMAL
MONOCYTES # BLD AUTO: 0.6 K/UL (ref 0.3–1)
MONOCYTES NFR BLD: 6.2 % (ref 4–15)
NEUTROPHILS # BLD AUTO: 6.6 K/UL (ref 1.8–7.7)
NEUTROPHILS NFR BLD: 73.8 % (ref 38–73)
NRBC BLD-RTO: 0 /100 WBC
PCO2 BLDA: 35.8 MMHG (ref 35–45)
PH SMN: 7.24 [PH] (ref 7.35–7.45)
PLATELET # BLD AUTO: 245 K/UL (ref 150–350)
PMV BLD AUTO: 10.5 FL (ref 9.2–12.9)
PO2 BLDA: 69 MMHG (ref 80–100)
POC BE: -12 MMOL/L
POC SATURATED O2: 90 % (ref 95–100)
POTASSIUM SERPL-SCNC: 4.4 MMOL/L (ref 3.5–5.1)
PROT SERPL-MCNC: 7.9 G/DL (ref 6–8.4)
RBC # BLD AUTO: 3.29 M/UL (ref 4.6–6.2)
SAMPLE: ABNORMAL
SITE: ABNORMAL
SODIUM SERPL-SCNC: 140 MMOL/L (ref 136–145)
TROPONIN I SERPL DL<=0.01 NG/ML-MCNC: <0.006 NG/ML (ref 0–0.03)
WBC # BLD AUTO: 8.94 K/UL (ref 3.9–12.7)

## 2020-01-13 PROCEDURE — 86803 HEPATITIS C AB TEST: CPT | Mod: HCNC

## 2020-01-13 PROCEDURE — 93010 EKG 12-LEAD: ICD-10-PCS | Mod: HCNC,,, | Performed by: INTERNAL MEDICINE

## 2020-01-13 PROCEDURE — 63600175 PHARM REV CODE 636 W HCPCS: Mod: HCNC | Performed by: EMERGENCY MEDICINE

## 2020-01-13 PROCEDURE — 84484 ASSAY OF TROPONIN QUANT: CPT | Mod: HCNC

## 2020-01-13 PROCEDURE — 11000001 HC ACUTE MED/SURG PRIVATE ROOM: Mod: HCNC

## 2020-01-13 PROCEDURE — 25000242 PHARM REV CODE 250 ALT 637 W/ HCPCS: Mod: HCNC | Performed by: EMERGENCY MEDICINE

## 2020-01-13 PROCEDURE — 85025 COMPLETE CBC W/AUTO DIFF WBC: CPT | Mod: HCNC

## 2020-01-13 PROCEDURE — 27000190 HC CPAP FULL FACE MASK W/VALVE: Mod: HCNC

## 2020-01-13 PROCEDURE — 99291 CRITICAL CARE FIRST HOUR: CPT | Mod: 25,HCNC

## 2020-01-13 PROCEDURE — 36600 WITHDRAWAL OF ARTERIAL BLOOD: CPT | Mod: HCNC

## 2020-01-13 PROCEDURE — 96374 THER/PROPH/DIAG INJ IV PUSH: CPT | Mod: HCNC

## 2020-01-13 PROCEDURE — 83880 ASSAY OF NATRIURETIC PEPTIDE: CPT | Mod: HCNC

## 2020-01-13 PROCEDURE — 93005 ELECTROCARDIOGRAM TRACING: CPT | Mod: HCNC

## 2020-01-13 PROCEDURE — 80053 COMPREHEN METABOLIC PANEL: CPT | Mod: HCNC

## 2020-01-13 PROCEDURE — 94640 AIRWAY INHALATION TREATMENT: CPT | Mod: HCNC

## 2020-01-13 PROCEDURE — 94660 CPAP INITIATION&MGMT: CPT | Mod: HCNC

## 2020-01-13 PROCEDURE — 36415 COLL VENOUS BLD VENIPUNCTURE: CPT | Mod: HCNC

## 2020-01-13 PROCEDURE — 99900035 HC TECH TIME PER 15 MIN (STAT): Mod: HCNC

## 2020-01-13 PROCEDURE — 82803 BLOOD GASES ANY COMBINATION: CPT | Mod: HCNC

## 2020-01-13 PROCEDURE — 93010 ELECTROCARDIOGRAM REPORT: CPT | Mod: HCNC,,, | Performed by: INTERNAL MEDICINE

## 2020-01-13 RX ORDER — FUROSEMIDE 10 MG/ML
40 INJECTION INTRAMUSCULAR; INTRAVENOUS
Status: COMPLETED | OUTPATIENT
Start: 2020-01-13 | End: 2020-01-13

## 2020-01-13 RX ORDER — FUROSEMIDE 10 MG/ML
40 INJECTION INTRAMUSCULAR; INTRAVENOUS
Status: DISCONTINUED | OUTPATIENT
Start: 2020-01-13 | End: 2020-01-13

## 2020-01-13 RX ORDER — CETIRIZINE HYDROCHLORIDE 10 MG/1
10 TABLET ORAL DAILY PRN
COMMUNITY

## 2020-01-13 RX ORDER — IPRATROPIUM BROMIDE AND ALBUTEROL SULFATE 2.5; .5 MG/3ML; MG/3ML
3 SOLUTION RESPIRATORY (INHALATION)
Status: COMPLETED | OUTPATIENT
Start: 2020-01-13 | End: 2020-01-13

## 2020-01-13 RX ADMIN — FUROSEMIDE 40 MG: 10 INJECTION, SOLUTION INTRAMUSCULAR; INTRAVENOUS at 09:01

## 2020-01-13 RX ADMIN — IPRATROPIUM BROMIDE AND ALBUTEROL SULFATE 3 ML: .5; 3 SOLUTION RESPIRATORY (INHALATION) at 08:01

## 2020-01-14 PROBLEM — R23.9 ALTERATION IN SKIN INTEGRITY: Status: ACTIVE | Noted: 2020-01-14

## 2020-01-14 PROBLEM — I50.31 ACUTE DIASTOLIC CONGESTIVE HEART FAILURE: Status: ACTIVE | Noted: 2020-01-13

## 2020-01-14 PROBLEM — J96.01 ACUTE RESPIRATORY FAILURE WITH HYPOXIA: Status: ACTIVE | Noted: 2020-01-14

## 2020-01-14 LAB
ALBUMIN SERPL BCP-MCNC: 3.8 G/DL (ref 3.5–5.2)
ALP SERPL-CCNC: 184 U/L (ref 55–135)
ALT SERPL W/O P-5'-P-CCNC: 7 U/L (ref 10–44)
ANION GAP SERPL CALC-SCNC: 13 MMOL/L (ref 8–16)
AST SERPL-CCNC: 9 U/L (ref 10–40)
BASOPHILS # BLD AUTO: 0.01 K/UL (ref 0–0.2)
BASOPHILS NFR BLD: 0.2 % (ref 0–1.9)
BILIRUB SERPL-MCNC: 0.5 MG/DL (ref 0.1–1)
BUN SERPL-MCNC: 28 MG/DL (ref 8–23)
BURR CELLS BLD QL SMEAR: ABNORMAL
CALCIUM SERPL-MCNC: 8.5 MG/DL (ref 8.7–10.5)
CHLORIDE SERPL-SCNC: 114 MMOL/L (ref 95–110)
CO2 SERPL-SCNC: 13 MMOL/L (ref 23–29)
CREAT SERPL-MCNC: 2.4 MG/DL (ref 0.5–1.4)
D DIMER PPP IA.FEU-MCNC: 4.35 MG/L FEU
DACRYOCYTES BLD QL SMEAR: ABNORMAL
DIFFERENTIAL METHOD: ABNORMAL
EOSINOPHIL # BLD AUTO: 0 K/UL (ref 0–0.5)
EOSINOPHIL NFR BLD: 0 % (ref 0–8)
ERYTHROCYTE [DISTWIDTH] IN BLOOD BY AUTOMATED COUNT: 15.2 % (ref 11.5–14.5)
EST. GFR  (AFRICAN AMERICAN): 30 ML/MIN/1.73 M^2
EST. GFR  (NON AFRICAN AMERICAN): 26 ML/MIN/1.73 M^2
ESTIMATED PA SYSTOLIC PRESSURE: 16.32
GLUCOSE SERPL-MCNC: 153 MG/DL (ref 70–110)
HCT VFR BLD AUTO: 29.8 % (ref 40–54)
HGB BLD-MCNC: 9.1 G/DL (ref 14–18)
IMM GRANULOCYTES # BLD AUTO: 0.02 K/UL (ref 0–0.04)
IMM GRANULOCYTES NFR BLD AUTO: 0.4 % (ref 0–0.5)
LYMPHOCYTES # BLD AUTO: 0.4 K/UL (ref 1–4.8)
LYMPHOCYTES NFR BLD: 8.8 % (ref 18–48)
MAGNESIUM SERPL-MCNC: 1.2 MG/DL (ref 1.6–2.6)
MCH RBC QN AUTO: 27.7 PG (ref 27–31)
MCHC RBC AUTO-ENTMCNC: 30.5 G/DL (ref 32–36)
MCV RBC AUTO: 91 FL (ref 82–98)
MONOCYTES # BLD AUTO: 0 K/UL (ref 0.3–1)
MONOCYTES NFR BLD: 0.9 % (ref 4–15)
NEUTROPHILS # BLD AUTO: 4.2 K/UL (ref 1.8–7.7)
NEUTROPHILS NFR BLD: 89.7 % (ref 38–73)
NRBC BLD-RTO: 0 /100 WBC
OVALOCYTES BLD QL SMEAR: ABNORMAL
PLATELET # BLD AUTO: 204 K/UL (ref 150–350)
PMV BLD AUTO: 10.3 FL (ref 9.2–12.9)
POCT GLUCOSE: 147 MG/DL (ref 70–110)
POIKILOCYTOSIS BLD QL SMEAR: SLIGHT
POTASSIUM SERPL-SCNC: 4.6 MMOL/L (ref 3.5–5.1)
PROT SERPL-MCNC: 8.2 G/DL (ref 6–8.4)
RBC # BLD AUTO: 3.29 M/UL (ref 4.6–6.2)
RETIRED EF AND QEF - SEE NOTES: 55 (ref 55–65)
SODIUM SERPL-SCNC: 140 MMOL/L (ref 136–145)
STOMATOCYTES BLD QL SMEAR: PRESENT
TARGETS BLD QL SMEAR: ABNORMAL
TROPONIN I SERPL DL<=0.01 NG/ML-MCNC: 0.02 NG/ML (ref 0–0.03)
TROPONIN I SERPL DL<=0.01 NG/ML-MCNC: 0.03 NG/ML (ref 0–0.03)
WBC # BLD AUTO: 4.67 K/UL (ref 3.9–12.7)

## 2020-01-14 PROCEDURE — 25000003 PHARM REV CODE 250: Mod: HCNC | Performed by: INTERNAL MEDICINE

## 2020-01-14 PROCEDURE — 93306 2D ECHO WITH COLOR FLOW DOPPLER: ICD-10-PCS | Mod: 26,HCNC,, | Performed by: INTERNAL MEDICINE

## 2020-01-14 PROCEDURE — 85025 COMPLETE CBC W/AUTO DIFF WBC: CPT | Mod: HCNC

## 2020-01-14 PROCEDURE — 99223 1ST HOSP IP/OBS HIGH 75: CPT | Mod: HCNC,,, | Performed by: INTERNAL MEDICINE

## 2020-01-14 PROCEDURE — 85379 FIBRIN DEGRADATION QUANT: CPT | Mod: HCNC

## 2020-01-14 PROCEDURE — 27000221 HC OXYGEN, UP TO 24 HOURS: Mod: HCNC

## 2020-01-14 PROCEDURE — 25000242 PHARM REV CODE 250 ALT 637 W/ HCPCS: Mod: HCNC | Performed by: INTERNAL MEDICINE

## 2020-01-14 PROCEDURE — 63600175 PHARM REV CODE 636 W HCPCS: Mod: HCNC | Performed by: INTERNAL MEDICINE

## 2020-01-14 PROCEDURE — 25000003 PHARM REV CODE 250: Mod: HCNC | Performed by: NURSE PRACTITIONER

## 2020-01-14 PROCEDURE — 94761 N-INVAS EAR/PLS OXIMETRY MLT: CPT | Mod: HCNC

## 2020-01-14 PROCEDURE — 36415 COLL VENOUS BLD VENIPUNCTURE: CPT | Mod: HCNC

## 2020-01-14 PROCEDURE — 93306 TTE W/DOPPLER COMPLETE: CPT | Mod: HCNC

## 2020-01-14 PROCEDURE — 99900035 HC TECH TIME PER 15 MIN (STAT): Mod: HCNC

## 2020-01-14 PROCEDURE — 99223 PR INITIAL HOSPITAL CARE,LEVL III: ICD-10-PCS | Mod: HCNC,,, | Performed by: INTERNAL MEDICINE

## 2020-01-14 PROCEDURE — 21400001 HC TELEMETRY ROOM: Mod: HCNC

## 2020-01-14 PROCEDURE — 63600175 PHARM REV CODE 636 W HCPCS: Mod: HCNC | Performed by: NURSE PRACTITIONER

## 2020-01-14 PROCEDURE — 80053 COMPREHEN METABOLIC PANEL: CPT | Mod: HCNC

## 2020-01-14 PROCEDURE — 93306 TTE W/DOPPLER COMPLETE: CPT | Mod: 26,HCNC,, | Performed by: INTERNAL MEDICINE

## 2020-01-14 PROCEDURE — 83735 ASSAY OF MAGNESIUM: CPT | Mod: HCNC

## 2020-01-14 PROCEDURE — 94640 AIRWAY INHALATION TREATMENT: CPT | Mod: HCNC

## 2020-01-14 PROCEDURE — 84484 ASSAY OF TROPONIN QUANT: CPT | Mod: 91,HCNC

## 2020-01-14 PROCEDURE — 84484 ASSAY OF TROPONIN QUANT: CPT | Mod: HCNC

## 2020-01-14 RX ORDER — IBUPROFEN 200 MG
16 TABLET ORAL
Status: DISCONTINUED | OUTPATIENT
Start: 2020-01-14 | End: 2020-01-15 | Stop reason: HOSPADM

## 2020-01-14 RX ORDER — FUROSEMIDE 10 MG/ML
40 INJECTION INTRAMUSCULAR; INTRAVENOUS DAILY
Status: DISCONTINUED | OUTPATIENT
Start: 2020-01-15 | End: 2020-01-15 | Stop reason: HOSPADM

## 2020-01-14 RX ORDER — AMLODIPINE BESYLATE 10 MG/1
10 TABLET ORAL DAILY
Status: DISCONTINUED | OUTPATIENT
Start: 2020-01-14 | End: 2020-01-15 | Stop reason: HOSPADM

## 2020-01-14 RX ORDER — LOSARTAN POTASSIUM 50 MG/1
100 TABLET ORAL DAILY
Status: DISCONTINUED | OUTPATIENT
Start: 2020-01-14 | End: 2020-01-15 | Stop reason: HOSPADM

## 2020-01-14 RX ORDER — IBUPROFEN 200 MG
24 TABLET ORAL
Status: DISCONTINUED | OUTPATIENT
Start: 2020-01-14 | End: 2020-01-15 | Stop reason: HOSPADM

## 2020-01-14 RX ORDER — ENOXAPARIN SODIUM 100 MG/ML
1 INJECTION SUBCUTANEOUS
Status: COMPLETED | OUTPATIENT
Start: 2020-01-14 | End: 2020-01-14

## 2020-01-14 RX ORDER — PANTOPRAZOLE SODIUM 40 MG/1
40 TABLET, DELAYED RELEASE ORAL DAILY
Status: DISCONTINUED | OUTPATIENT
Start: 2020-01-14 | End: 2020-01-15 | Stop reason: HOSPADM

## 2020-01-14 RX ORDER — CLOPIDOGREL BISULFATE 75 MG/1
75 TABLET ORAL DAILY
Status: DISCONTINUED | OUTPATIENT
Start: 2020-01-14 | End: 2020-01-15 | Stop reason: HOSPADM

## 2020-01-14 RX ORDER — FUROSEMIDE 10 MG/ML
40 INJECTION INTRAMUSCULAR; INTRAVENOUS ONCE
Status: COMPLETED | OUTPATIENT
Start: 2020-01-14 | End: 2020-01-14

## 2020-01-14 RX ORDER — GLUCAGON 1 MG
1 KIT INJECTION
Status: DISCONTINUED | OUTPATIENT
Start: 2020-01-14 | End: 2020-01-15 | Stop reason: HOSPADM

## 2020-01-14 RX ORDER — CARVEDILOL 12.5 MG/1
25 TABLET ORAL 2 TIMES DAILY WITH MEALS
Status: DISCONTINUED | OUTPATIENT
Start: 2020-01-14 | End: 2020-01-15 | Stop reason: HOSPADM

## 2020-01-14 RX ORDER — ISOSORBIDE MONONITRATE 60 MG/1
120 TABLET, EXTENDED RELEASE ORAL DAILY
Status: DISCONTINUED | OUTPATIENT
Start: 2020-01-14 | End: 2020-01-15 | Stop reason: HOSPADM

## 2020-01-14 RX ORDER — ENOXAPARIN SODIUM 100 MG/ML
40 INJECTION SUBCUTANEOUS EVERY 24 HOURS
Status: DISCONTINUED | OUTPATIENT
Start: 2020-01-14 | End: 2020-01-14

## 2020-01-14 RX ORDER — OXYCODONE AND ACETAMINOPHEN 5; 325 MG/1; MG/1
1 TABLET ORAL EVERY 6 HOURS PRN
Status: DISCONTINUED | OUTPATIENT
Start: 2020-01-14 | End: 2020-01-15 | Stop reason: HOSPADM

## 2020-01-14 RX ORDER — LANOLIN ALCOHOL/MO/W.PET/CERES
400 CREAM (GRAM) TOPICAL 2 TIMES DAILY
Status: DISCONTINUED | OUTPATIENT
Start: 2020-01-14 | End: 2020-01-15 | Stop reason: HOSPADM

## 2020-01-14 RX ORDER — NITROGLYCERIN 0.4 MG/1
0.4 TABLET SUBLINGUAL EVERY 5 MIN PRN
Status: DISCONTINUED | OUTPATIENT
Start: 2020-01-14 | End: 2020-01-15 | Stop reason: HOSPADM

## 2020-01-14 RX ORDER — IPRATROPIUM BROMIDE AND ALBUTEROL SULFATE 2.5; .5 MG/3ML; MG/3ML
3 SOLUTION RESPIRATORY (INHALATION)
Status: DISCONTINUED | OUTPATIENT
Start: 2020-01-14 | End: 2020-01-15 | Stop reason: HOSPADM

## 2020-01-14 RX ORDER — SODIUM CHLORIDE 0.9 % (FLUSH) 0.9 %
10 SYRINGE (ML) INJECTION
Status: DISCONTINUED | OUTPATIENT
Start: 2020-01-14 | End: 2020-01-15 | Stop reason: HOSPADM

## 2020-01-14 RX ORDER — ASPIRIN 81 MG/1
81 TABLET ORAL DAILY
Status: DISCONTINUED | OUTPATIENT
Start: 2020-01-14 | End: 2020-01-15 | Stop reason: HOSPADM

## 2020-01-14 RX ADMIN — Medication 400 MG: at 04:01

## 2020-01-14 RX ADMIN — ASPIRIN 81 MG: 81 TABLET, COATED ORAL at 08:01

## 2020-01-14 RX ADMIN — NITROGLYCERIN 0.4 MG: 0.4 TABLET, ORALLY DISINTEGRATING SUBLINGUAL at 07:01

## 2020-01-14 RX ADMIN — CARVEDILOL 25 MG: 12.5 TABLET, FILM COATED ORAL at 07:01

## 2020-01-14 RX ADMIN — PANTOPRAZOLE SODIUM 40 MG: 40 TABLET, DELAYED RELEASE ORAL at 08:01

## 2020-01-14 RX ADMIN — FUROSEMIDE 40 MG: 10 INJECTION, SOLUTION INTRAMUSCULAR; INTRAVENOUS at 03:01

## 2020-01-14 RX ADMIN — ISOSORBIDE MONONITRATE 120 MG: 60 TABLET, EXTENDED RELEASE ORAL at 08:01

## 2020-01-14 RX ADMIN — AMLODIPINE BESYLATE 10 MG: 10 TABLET ORAL at 08:01

## 2020-01-14 RX ADMIN — IPRATROPIUM BROMIDE AND ALBUTEROL SULFATE 3 ML: .5; 3 SOLUTION RESPIRATORY (INHALATION) at 08:01

## 2020-01-14 RX ADMIN — CARVEDILOL 25 MG: 12.5 TABLET, FILM COATED ORAL at 04:01

## 2020-01-14 RX ADMIN — ENOXAPARIN SODIUM 90 MG: 100 INJECTION SUBCUTANEOUS at 02:01

## 2020-01-14 RX ADMIN — CLOPIDOGREL BISULFATE 75 MG: 75 TABLET ORAL at 08:01

## 2020-01-14 RX ADMIN — LOSARTAN POTASSIUM 100 MG: 50 TABLET ORAL at 08:01

## 2020-01-14 RX ADMIN — ALUMINUM HYDROXIDE, MAGNESIUM HYDROXIDE, AND SIMETHICONE: 200; 200; 20 SUSPENSION ORAL at 08:01

## 2020-01-14 NOTE — SUBJECTIVE & OBJECTIVE
Interval History: SOB has not returned to baseline. Had 1 earlier episode of chest pain that resolved. IV lasix in process, 2 D ECHO pending. Pt denies further chest pain.     Review of Systems   Constitutional: Negative for activity change, appetite change, chills, diaphoresis and fatigue.   HENT: Negative for congestion, dental problem, ear discharge, ear pain and facial swelling.    Eyes: Negative for pain, discharge and itching.   Respiratory: Positive for cough and shortness of breath. Negative for apnea and chest tightness.    Cardiovascular: Negative for chest pain and leg swelling.   Gastrointestinal: Negative for abdominal distention and abdominal pain.   Endocrine: Negative for cold intolerance, heat intolerance and polydipsia.   Genitourinary: Negative for difficulty urinating, dysuria and enuresis.   Musculoskeletal: Negative for arthralgias and back pain.   Skin: Negative for color change and pallor.   Allergic/Immunologic: Negative for environmental allergies and food allergies.   Neurological: Negative for dizziness, facial asymmetry, light-headedness and headaches.   Hematological: Negative for adenopathy. Does not bruise/bleed easily.   Psychiatric/Behavioral: Negative for agitation and behavioral problems.     Objective:     Vital Signs (Most Recent):  Temp: 97.3 °F (36.3 °C) (01/14/20 1512)  Pulse: 73 (01/14/20 1512)  Resp: 18 (01/14/20 1512)  BP: (!) 148/64 (01/14/20 1512)  SpO2: 97 % (01/14/20 1512) Vital Signs (24h Range):  Temp:  [97.3 °F (36.3 °C)-98.8 °F (37.1 °C)] 97.3 °F (36.3 °C)  Pulse:  [70-97] 73  Resp:  [17-46] 18  SpO2:  [95 %-100 %] 97 %  BP: (135-193)/(64-87) 148/64     Weight: 79 kg (174 lb 2.6 oz)  Body mass index is 22.98 kg/m².    Intake/Output Summary (Last 24 hours) at 1/14/2020 1544  Last data filed at 1/14/2020 0027  Gross per 24 hour   Intake --   Output 425 ml   Net -425 ml      Physical Exam   Constitutional: He is oriented to person, place, and time. He appears  well-developed and well-nourished.   HENT:   Head: Normocephalic and atraumatic.   Nose: Nose normal.   Eyes: EOM are normal.   Neck: Normal range of motion. Neck supple.   Cardiovascular: Normal rate, regular rhythm and normal heart sounds.   Pulmonary/Chest: Effort normal and breath sounds normal. No respiratory distress. He has no wheezes. He has no rales.   Abdominal: Soft. Bowel sounds are normal. There is no tenderness.   Musculoskeletal: Normal range of motion. He exhibits no edema.   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   Skin: Skin is dry.   Psychiatric: He has a normal mood and affect. His behavior is normal.   Nursing note and vitals reviewed.      Significant Labs:   CBC:   Recent Labs   Lab 01/13/20 2037 01/14/20  0729   WBC 8.94 4.67   HGB 9.1* 9.1*   HCT 30.3* 29.8*    204     CMP:   Recent Labs   Lab 01/13/20 2037 01/14/20  0729    140   K 4.4 4.6   * 114*   CO2 13* 13*   * 153*   BUN 22 28*   CREATININE 2.4* 2.4*   CALCIUM 7.6* 8.5*   PROT 7.9 8.2   ALBUMIN 3.7 3.8   BILITOT 0.5 0.5   ALKPHOS 187* 184*   AST 10 9*   ALT 8* 7*   ANIONGAP 13 13   EGFRNONAA 26* 26*     All pertinent labs within the past 24 hours have been reviewed.    Significant Imaging: I have reviewed all pertinent imaging results/findings within the past 24 hours.

## 2020-01-14 NOTE — ASSESSMENT & PLAN NOTE
Will continue lasix, BNP is 965, will order cardiac echo >>>> pending  On bipap >>>> weaned to nasal cannula  Lasix diuresis continued  Strict I & O  Cardiac diet   Cardiology input pending

## 2020-01-14 NOTE — PROGRESS NOTES
"[facility] Hospital Medicine  Progress Note    Patient Name: Kodi Ribeiro  MRN: [id number]  Patient Class: IP- Inpatient   Admission Date: 1/13/2020  Length of Stay: 1 days  Attending Physician: Stefan Leary MD  Primary Care Provider: Norma Nuñez MD        Subjective:     Principal Problem:Acute diastolic congestive heart failure        HPI:   69 year old man with extensive past medical and surgery history of    stroke, spinal cord disease, peripheral vascular disease, MI (per patient, 2000 & 2012), lipoma of colon, infection of aortic graft, HTN, HLD, hyperglycemia, horseshoe kidney, hemorrhoids, GERD,  Who presented today with sudden onset of worsening shortness of breath . He went to work this morning and was preparing to watch the [email or web address] game with the wife when he called the wife to call 911 as he could not breath. Per  EMS: "Pt was sitting on the floor and short of breath, with initial O2 sats around 76. Pt was given 6L O2 via nasal cannula, with O2 sats going up to the mid 80s.He was started on bipap and was given duonebs and solumedrol . He denies any history of long distance travel , leg swelling or chest pain .  Previous documentation showed that [facility] -03/2019-Three vessel coronary artery disease.   2.   Normal LVEF.   3.   Diastolic dysfunction.  Lab review showed BNP-965, d dimer is pending .  Chest x-ray showed pattern of   pulmonary vascular congestion and diffuse coarsening of the interstitium.   ABG showed-PH-7.24,po2-69    Overview/Hospital Course:  Pt experienced Acute hypoxic respiratory failure at home and SOB. Initially placed on BiPAP, given lasix then oxygen weaned to nasal cannula. Pt has a documented diastolic heart failure and CXR indicates pulmonary vascular congestion and diuresis with IV lasix initiated. Repeat 2 D ECHO pending and Cardiology consult pending. According to patient he has CAD which could not be intervened. He is followed by Cardiology in [facility] [place]. Serial troponin normal.     Interval History: SOB has not returned to baseline. Had 1 earlier episode of chest pain that resolved. IV lasix in process, 2 D ECHO pending. Pt denies further chest pain.     Review of Systems   Constitutional: Negative for activity change, appetite change, chills, diaphoresis and fatigue.   HENT: Negative for congestion, dental problem, ear discharge, ear pain and facial swelling.    Eyes: Negative for pain, discharge and itching.   Respiratory: Positive for cough and shortness of breath. Negative for apnea and chest tightness.    Cardiovascular: Negative for chest pain and leg swelling.   Gastrointestinal: Negative for abdominal distention and abdominal pain.   Endocrine: Negative for cold intolerance, heat intolerance and polydipsia.   Genitourinary: Negative for difficulty urinating, dysuria and enuresis.   Musculoskeletal: Negative for arthralgias and back pain.   Skin: Negative for color change and pallor.   Allergic/Immunologic: Negative for environmental allergies and food allergies.   Neurological: Negative for dizziness, facial asymmetry, light-headedness and headaches.   Hematological: Negative for adenopathy. Does not bruise/bleed easily.   Psychiatric/Behavioral: Negative for agitation and behavioral problems.     Objective:     Vital Signs (Most Recent):  Temp: 97.3 °F (36.3 °C) (01/14/20 1512)  Pulse: 73 (01/14/20 1512)  Resp: 18 (01/14/20 1512)  BP: (!) 148/64 (01/14/20 1512)  SpO2: 97 % (01/14/20 1512) Vital Signs (24h Range):  Temp:  [97.3 °F (36.3 °C)-98.8 °F (37.1 °C)] 97.3 °F (36.3 °C)  Pulse:  [70-97] 73  Resp:  [17-46] 18  SpO2:  [95 %-100 %] 97 %  BP: (135-193)/(64-87) 148/64     Weight: 79 kg (174 lb 2.6 oz)  Body mass index is 22.98 kg/m².    Intake/Output Summary (Last 24 hours) at 1/14/2020 1548  Last data filed at 1/14/2020 0027  Gross per 24 hour   Intake --   Output 425 ml   Net -425 ml      Physical Exam   Constitutional: He is  oriented to person, place, and time. He appears well-developed and well-nourished.   HENT:   Head: Normocephalic and atraumatic.   Nose: Nose normal.   Eyes: EOM are normal.   Neck: Normal range of motion. Neck supple.   Cardiovascular: Normal rate, regular rhythm and normal heart sounds.   Pulmonary/Chest: Effort normal and breath sounds normal. No respiratory distress. He has no wheezes. He has no rales.   Abdominal: Soft. Bowel sounds are normal. There is no tenderness.   Musculoskeletal: Normal range of motion. He exhibits no edema.   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   Skin: Skin is dry.   Psychiatric: He has a normal mood and affect. His behavior is normal.   Nursing note and vitals reviewed.      Significant Labs:   CBC:   Recent Labs   Lab 01/13/20 2037 01/14/20  0729   WBC 8.94 4.67   HGB 9.1* 9.1*   HCT 30.3* 29.8*    204     CMP:   Recent Labs   Lab 01/13/20 2037 01/14/20  0729    140   K 4.4 4.6   * 114*   CO2 13* 13*   * 153*   BUN 22 28*   CREATININE 2.4* 2.4*   CALCIUM 7.6* 8.5*   PROT 7.9 8.2   ALBUMIN 3.7 3.8   BILITOT 0.5 0.5   ALKPHOS 187* 184*   AST 10 9*   ALT 8* 7*   ANIONGAP 13 13   EGFRNONAA 26* 26*     All pertinent labs within the past 24 hours have been reviewed.    Significant Imaging: I have reviewed all pertinent imaging results/findings within the past 24 hours.      Assessment/Plan:      * Acute diastolic congestive heart failure    Will continue lasix, BNP is 965, will order cardiac echo >>>> pending  On bipap >>>> weaned to nasal cannula  Lasix diuresis continued  Strict I & O  Cardiac diet   Cardiology input pending    SOB (shortness of breath)    Could be related to diastolic CHF , was given lasix in the ED, will need to rule out PE, no wheezing at this time .  Will follow seral troponin   ,will give one dose of lovenox 1mg/kg now  1/14/2020 - troponins normal, diuresis  continued, imaging negative for PE and Cardiology consult pending       Hypoxemia    Will need to rule out PE, will check d dimer and will order VQ scan as  CTA of the chest  Cannot be done due to renal impairment >>>>>>>> PE ruled out with negative VQ scan  Will order cardiac echo>>>> pending  Pt was admitted to Telemetry unit on nasal cannula  May require home oxygen eval prior to discharge    CAD;Old MI ; s/p stents x 3 (2000)     Will continue aspirin/plavix , imdur.    CKD (chronic kidney disease) stage 3, GFR 30-59 ml/min    Will avoid nephrotoxic meds , monitor closely     Essential hypertension    Will continue losartan, hydralazine prn      VTE Risk Mitigation (From admission, onward)         Ordered     IP VTE HIGH RISK PATIENT  Once      01/14/20 0019                      Matilda Soriano NP  Department of Hospital Medicine   [facility]

## 2020-01-14 NOTE — CONSULTS
"[facility] -   Cardiology  Consult Note    Patient Name: Kodi Ribeiro  MRN: [id number]  Admission Date: 1/13/2020  Hospital Length of Stay: 1 days  Code Status: Full Code   Attending Provider: Stefan Leary MD   Consulting Provider: NICOLE Scott  Primary Care Physician: Norma Nuñez MD  Principal Problem:Acute diastolic congestive heart failure    Patient information was obtained from patient, past medical records and ER records.     Inpatient consult to Cardiology  Consult performed by: NICOLE Feliciano  Consult ordered by: Matilda Soriano NP        Subjective:     Chief Complaint:  Shortness of breath     HPI:   Kodi Ribeiro is a 69 year old male who presents to [facility] due to sudden onset of shortness of breath. Per EMS " patient was sitting on the floor and short of breath with initial O2 sats around 76%, patient was given 6L O2 via NC with O2 sats up to the mid 80s." He was started on Bipap and given Duonebs and Steroids. His current medical conditions include HTN, HLP, Old MI, PAD s/p right BKA and left forefoot amputation, Carotid artery disease s/p  Left CEA, CKD Stage III-IV, CAD s/p PCI in 2000. Of note, patient is followed by Dr. Mancia and was last seen a few months ago and opted for medical management over PCI due to ongoing issues with CKD. He was deemed not a surgical candidate for CABG per Dr. Ibarra. He had PET stress which showed a small sized, mild intensity mid to apical anteroseptal stress induced perfusion abnormality in distribution of mid LAD and small sized abnormality in distribution of RCA.     ED workup revealed , Troponin negative x3, Mag 1.2, K+ 4.4, Cr 2.4, D Dimer 4.35, H/H 9.1/29.8. VQ scan revealed low probability for PE. CXR revealed a pattern of pulmonary vascular congestion and diffuse coarsening of interstitium. ECHO pending. Patient admitted to Telemetry under the care of hospital medicine. Cardiology consulted to assist with " medical management. Chart reviewed, patient seen and examined.     Denies chest pain or anginal equivalents on exam today. Feels much better on exam today. Reports noncompliance with dietary restrictions on a daily basis and eats excess salt. NO shortness of breath, MACKEY or palpitations. Trace LLE edema today on exam. Denies orthopnea, PND or abdominal bloating today.          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    Late complications of amputation stump     rseolved with further amputation( MRSA then none since 2014)    Lipoma of colon     colonoscopy 2/21/2014    Myocardial infarction     per patient 2000 & 9/2012    Peripheral vascular disease     Phantom limb syndrome     patient reports only intermittent not problematic, not worsening    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    Spinal cord disease     L4L5 disc    Stroke     Tobacco dependence     resolved    Ureteral stent retained        Past Surgical History:   Procedure Laterality Date    ABDOMINAL AORTIC ANEURYSM REPAIR      ABDOMINAL AORTIC ANEURYSM REPAIR  1996/2014    AMPUTATION, LOWER LIMB      AORTA - BILATERAL FEMORAL ARTERY BYPASS GRAFT  2014    Left and right leg    CORONARY ANGIOPLASTY WITH STENT PLACEMENT  2000    Three placed in heart    CYSTOSCOPY W/ RETROGRADES Left 5/29/2018    Procedure: CYSTOSCOPY, WITH RETROGRADE PYELOGRAM;  Surgeon: Scooter Jin IV, MD;  Location: [facility];  Service: Urology;   Laterality: Left;    CYSTOSCOPY W/ URETERAL STENT PLACEMENT Left 5/29/2018    Procedure: CYSTOSCOPY, WITH URETERAL STENT INSERTION;  Surgeon: Scooter Jin IV, MD;  Location: [facility] OR;  Service: Urology;  Laterality: Left;    CYSTOSCOPY W/ URETERAL STENT REMOVAL Left 5/29/2018    Procedure: CYSTOSCOPY, WITH URETERAL STENT REMOVAL;  Surgeon: Scooter Jin IV, MD;  Location: [facility] OR;  Service: Urology;  Laterality: Left;    FOOT AMPUTATION THROUGH METATARSAL  1996    left    FOOT SURGERY Bilateral 1980's    per patient multiple toe amputations prior to.  partial foot amputation:first great toe then other toes     KIDNEY SURGERY  2014    per patient separation of horseshoe kidney @ time of AAA repair    LEFT HEART CATHETERIZATION Left 3/7/2019    Procedure: CATHETERIZATION, HEART, LEFT;  Surgeon: Adriel Boone MD;  Location: [facility] CATH LAB;  Service: Cardiology;  Laterality: Left;  630 admit for IV hydration  10am start    LUNG LOBECTOMY Right 1970s    per patient not cancer    right below knee amputation  2009 (approx)    SMALL INTESTINE SURGERY  2014    per patient partial @ time of aaa repair  not small bowel - large bowel bowel compromised bythtwe AAAbowel    TONSILLECTOMY  1955 aprox    URETERAL STENT PLACEMENT Left     annually replaced since 2012 or so  Dr Jin       Review of patient's allergies indicates:   Allergen Reactions    Morphine Itching       No current facility-administered medications on file prior to encounter.      Current Outpatient Medications on File Prior to Encounter   Medication Sig    amLODIPine (NORVASC) 10 MG tablet TAKE 1 TABLET EVERY DAY    aspirin (ECOTRIN) 81 MG EC tablet Take 1 tablet (81 mg total) by mouth once daily.    carvedilol (COREG) 25 MG tablet Take 1 tablet (25 mg total) by mouth 2 (two) times daily with meals.    cetirizine (ZYRTEC) 10 MG tablet Take 10 mg by mouth once daily.    clopidogrel (PLAVIX) 75 mg tablet TAKE 1 TABLET EVERY DAY     isosorbide mononitrate (IMDUR) 60 MG 24 hr tablet Take 2 tablets (120 mg total) by mouth once daily.    losartan (COZAAR) 100 MG tablet Take 1 tablet (100 mg total) by mouth once daily.    omeprazole (PRILOSEC) 20 MG capsule TAKE 1 CAPSULE TWICE DAILY    mupirocin (BACTROBAN) 2 % ointment Apply topically 3 (three) times daily.    nitroglycerin (NITROSTAT) 0.6 MG Subl Place 1 tablet (0.6 mg total) under the tongue every 5 (five) minutes as needed (max 3/ per episode).    oxyCODONE-acetaminophen (PERCOCET) 5-325 mg per tablet Take 1 tablet by mouth every 6 (six) hours as needed for Pain.     Family History     Problem Relation (Age of Onset)    COPD Mother    Cancer Mother    Diabetes Daughter    Heart disease Father        Tobacco Use    Smoking status: Former Smoker     Packs/day: 1.00     Years: 15.00     Pack years: 15.00     Last attempt to quit: 2009     Years since quittin.0    Smokeless tobacco: Never Used   Substance and Sexual Activity    Alcohol use: No    Drug use: No     Comment: Is on prescription opiod, no non prescribed use    Sexual activity: Not Currently     Partners: Female     Review of Systems   Constitution: Positive for malaise/fatigue.   HENT: Negative for hearing loss and hoarse voice.    Eyes: Negative for blurred vision and visual disturbance.   Cardiovascular: Positive for dyspnea on exertion and leg swelling. Negative for chest pain, claudication, irregular heartbeat, near-syncope, orthopnea, palpitations, paroxysmal nocturnal dyspnea and syncope.   Respiratory: Positive for shortness of breath. Negative for cough, hemoptysis, sleep disturbances due to breathing, snoring and wheezing.    Endocrine: Negative for cold intolerance and heat intolerance.   Hematologic/Lymphatic: Does not bruise/bleed easily.   Skin: Negative for color change, dry skin and nail changes.   Musculoskeletal: Positive for arthritis and back pain. Negative for joint pain and myalgias.    Gastrointestinal: Negative for bloating, abdominal pain, constipation, nausea and vomiting.   Genitourinary: Negative for dysuria, flank pain, hematuria and hesitancy.   Neurological: Positive for weakness. Negative for headaches, light-headedness, loss of balance, numbness and paresthesias.   Psychiatric/Behavioral: Negative for altered mental status.   Allergic/Immunologic: Negative for environmental allergies.     Objective:     Vital Signs (Most Recent):  Temp: 97.3 °F (36.3 °C) (01/14/20 1512)  Pulse: 73 (01/14/20 1512)  Resp: 18 (01/14/20 1512)  BP: (!) 148/64 (01/14/20 1512)  SpO2: 97 % (01/14/20 1512) Vital Signs (24h Range):  Temp:  [97.3 °F (36.3 °C)-98.8 °F (37.1 °C)] 97.3 °F (36.3 °C)  Pulse:  [70-97] 73  Resp:  [17-46] 18  SpO2:  [95 %-100 %] 97 %  BP: (135-193)/(64-87) 148/64     Weight: 79 kg (174 lb 2.6 oz)  Body mass index is 22.98 kg/m².    SpO2: 97 %  O2 Device (Oxygen Therapy): nasal cannula      Intake/Output Summary (Last 24 hours) at 1/14/2020 1519  Last data filed at 1/14/2020 0027  Gross per 24 hour   Intake --   Output 425 ml   Net -425 ml       Lines/Drains/Airways     Peripheral Intravenous Line                 Peripheral IV - Single Lumen 20 G Left Antecubital -- days                Physical Exam   Constitutional: He is oriented to person, place, and time. He appears well-developed and well-nourished. No distress.   HENT:   Head: Normocephalic and atraumatic.   Eyes: Pupils are equal, round, and reactive to light.   Neck: Normal range of motion and full passive range of motion without pain. Neck supple. No JVD present. No tracheal deviation present. No thyromegaly present.   Cardiovascular: Normal rate, regular rhythm, S1 normal, S2 normal and intact distal pulses. PMI is not displaced. Exam reveals no distant heart sounds.   No murmur heard.  Pulses:       Radial pulses are 2+ on the right side, and 2+ on the left side.        Dorsalis pedis pulses are 2+ on the right side, and 2+ on  the left side.   CHEST PAIN FREE   Pulmonary/Chest: Effort normal and breath sounds normal. No accessory muscle usage. No respiratory distress. He has no decreased breath sounds. He has no wheezes. He has no rales.   Abdominal: Soft. Bowel sounds are normal. He exhibits no distension. There is no tenderness.   Musculoskeletal: Normal range of motion. He exhibits no edema.        Left ankle: He exhibits swelling.   Neurological: He is alert and oriented to person, place, and time.   Skin: Skin is warm and dry. He is not diaphoretic. No cyanosis. Nails show no clubbing.   Psychiatric: He has a normal mood and affect. His speech is normal and behavior is normal. Judgment and thought content normal. Cognition and memory are normal.   Nursing note and vitals reviewed.      Significant Labs:   BMP:   Recent Labs   Lab 01/13/20 2037 01/14/20  0729   * 153*    140   K 4.4 4.6   * 114*   CO2 13* 13*   BUN 22 28*   CREATININE 2.4* 2.4*   CALCIUM 7.6* 8.5*   MG  --  1.2*   , CMP   Recent Labs   Lab 01/13/20 2037 01/14/20  0729    140   K 4.4 4.6   * 114*   CO2 13* 13*   * 153*   BUN 22 28*   CREATININE 2.4* 2.4*   CALCIUM 7.6* 8.5*   PROT 7.9 8.2   ALBUMIN 3.7 3.8   BILITOT 0.5 0.5   ALKPHOS 187* 184*   AST 10 9*   ALT 8* 7*   ANIONGAP 13 13   ESTGFRAFRICA 30* 30*   EGFRNONAA 26* 26*   , CBC   Recent Labs   Lab 01/13/20 2037 01/14/20  0729   WBC 8.94 4.67   HGB 9.1* 9.1*   HCT 30.3* 29.8*    204   , Troponin   Recent Labs   Lab 01/13/20 2037 01/14/20  0027 01/14/20  0729   TROPONINI <0.006 0.024 0.026    and All pertinent lab results from the last 24 hours have been reviewed.    Significant Imaging: Echocardiogram:   2D echo with color flow doppler:   Results for orders placed or performed during the hospital encounter of 01/13/20   2D echo with color flow doppler    Narrative    This study is in progress....    and Transthoracic echo (TTE) complete (Cupid Only): No results  found for this or any previous visit. and X-Ray: CXR: X-Ray Chest 1 View (CXR): No results found for this visit on 01/13/20. and X-Ray Chest PA and Lateral (CXR): No results found for this visit on 01/13/20.    Assessment and Plan:     * Acute diastolic congestive heart failure  -Continue IV lasix  -Continue Coreg, Norvasc, ARB, Imdur  -Dash diet, 2 gm sodium restriction  -1500 ml fluid restriction  -Daily weights  -Strict intake and output  -Recommend dietary consult to reinforce dietary restrictions  -Reinforced compliance with dietary restrictions   -ECHO pending  -Reassess in AM    SOB (shortness of breath)  -see plan for CHF    CAD;Old MI ; s/p stents x 3 (2000)   -continue current medical management  -OP Follow up with Dr Mancia    CKD (chronic kidney disease) stage 3, GFR 30-59 ml/min  -monitor renal function closely    Essential hypertension  -On medical therapy  -Continue Coreg, Norvasc, Imdur, ARB, IV Lasix         VTE Risk Mitigation (From admission, onward)         Ordered     IP VTE HIGH RISK PATIENT  Once      01/14/20 0019                Thank you for your consult. I will follow-up with patient. Please contact us if you have any additional questions.    NICOLE Scott  Cardiology   [facility] [place]

## 2020-01-14 NOTE — ASSESSMENT & PLAN NOTE
Could be related to diastolic CHF , was given lasix in the ED, will need to rule out PE, no wheezing at this time .  Will follow seral troponin   ,will give one dose of lovenox 1mg/kg now  1/14/2020 - troponins normal, diuresis continued, imaging negative for PE and Cardiology consult pending

## 2020-01-14 NOTE — HPI
" 69 year old man with extensive past medical and surgery history of    stroke, spinal cord disease, peripheral vascular disease, MI (per patient, 2000 & 2012), lipoma of colon, infection of aortic graft, HTN, HLD, hyperglycemia, horseshoe kidney, hemorrhoids, GERD,  Who presented today with sudden onset of worsening shortness of breath . He went to work this morning and was preparing to watch the [employer] game with the wife when he called the wife to call 911 as he could not breath. Per  EMS: "Pt was sitting on the floor and short of breath, with initial O2 sats around 76. Pt was given 6L O2 via nasal cannula, with O2 sats going up to the mid 80s.He was started on bipap and was given duonebs and solumedrol . He denies any history of long distance travel , leg swelling or chest pain .  Previous documentation showed that [facility] -03/2019-Three vessel coronary artery disease.   2.   Normal LVEF.   3.   Diastolic dysfunction.  Lab review showed BNP-965, d dimer is pending .  Chest x-ray showed pattern of   pulmonary vascular congestion and diffuse coarsening of the interstitium.   ABG showed-PH-7.24,po2-69  "

## 2020-01-14 NOTE — SUBJECTIVE & OBJECTIVE
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    Late complications of amputation stump     rseolved with further amputation( MRSA then none since 2014)    Lipoma of colon     colonoscopy 2/21/2014    Myocardial infarction     per patient 2000 & 9/2012    Peripheral vascular disease     Phantom limb syndrome     patient reports only intermittent not problematic, not worsening    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    Spinal cord disease     L4L5 disc    Stroke     Tobacco dependence     resolved    Ureteral stent retained        Past Surgical History:   Procedure Laterality Date    ABDOMINAL AORTIC ANEURYSM REPAIR      ABDOMINAL AORTIC ANEURYSM REPAIR  1996/2014    AMPUTATION, LOWER LIMB      AORTA - BILATERAL FEMORAL ARTERY BYPASS GRAFT  2014    Left and right leg    CORONARY ANGIOPLASTY WITH STENT PLACEMENT  2000    Three placed in heart    CYSTOSCOPY W/ RETROGRADES Left 5/29/2018    Procedure: CYSTOSCOPY, WITH RETROGRADE PYELOGRAM;  Surgeon: Scooter Jin IV, MD;  Location: [facility] OR;  Service: Urology;  Laterality: Left;    CYSTOSCOPY W/ URETERAL STENT PLACEMENT Left 5/29/2018    Procedure: CYSTOSCOPY, WITH URETERAL STENT INSERTION;  Surgeon: Scooter Jin IV, MD;  Location: [facility];  Service: Urology;  Laterality: Left;    CYSTOSCOPY W/ URETERAL STENT REMOVAL Left 5/29/2018    Procedure: CYSTOSCOPY, WITH URETERAL STENT REMOVAL;  Surgeon: Scooter Jin IV, MD;  Location:  [facility] OR;  Service: Urology;  Laterality: Left;    FOOT AMPUTATION THROUGH METATARSAL  1996    left    FOOT SURGERY Bilateral 1980's    per patient multiple toe amputations prior to.  partial foot amputation:first great toe then other toes     KIDNEY SURGERY  2014    per patient separation of horseshoe kidney @ time of AAA repair    LEFT HEART CATHETERIZATION Left 3/7/2019    Procedure: CATHETERIZATION, HEART, LEFT;  Surgeon: Adriel Boone MD;  Location: [facility] CATH LAB;  Service: Cardiology;  Laterality: Left;  630 admit for IV hydration  10am start    LUNG LOBECTOMY Right 1970s    per patient not cancer    right below knee amputation  2009 (approx)    SMALL INTESTINE SURGERY  2014    per patient partial @ time of aaa repair  not small bowel - large bowel bowel compromised bythtwe AAAbowel    TONSILLECTOMY  1955 aprox    URETERAL STENT PLACEMENT Left     annually replaced since 2012 or so  Dr Jin       Review of patient's allergies indicates:   Allergen Reactions    Morphine Itching       No current facility-administered medications on file prior to encounter.      Current Outpatient Medications on File Prior to Encounter   Medication Sig    amLODIPine (NORVASC) 10 MG tablet TAKE 1 TABLET EVERY DAY    aspirin (ECOTRIN) 81 MG EC tablet Take 1 tablet (81 mg total) by mouth once daily.    carvedilol (COREG) 25 MG tablet Take 1 tablet (25 mg total) by mouth 2 (two) times daily with meals.    cetirizine (ZYRTEC) 10 MG tablet Take 10 mg by mouth once daily.    clopidogrel (PLAVIX) 75 mg tablet TAKE 1 TABLET EVERY DAY    isosorbide mononitrate (IMDUR) 60 MG 24 hr tablet Take 2 tablets (120 mg total) by mouth once daily.    losartan (COZAAR) 100 MG tablet Take 1 tablet (100 mg total) by mouth once daily.    omeprazole (PRILOSEC) 20 MG capsule TAKE 1 CAPSULE TWICE DAILY    mupirocin (BACTROBAN) 2 % ointment Apply topically 3 (three) times daily.    nitroglycerin (NITROSTAT) 0.6 MG Subl Place 1 tablet  (0.6 mg total) under the tongue every 5 (five) minutes as needed (max 3/ per episode).    oxyCODONE-acetaminophen (PERCOCET) 5-325 mg per tablet Take 1 tablet by mouth every 6 (six) hours as needed for Pain.    [DISCONTINUED] atorvastatin (LIPITOR) 40 MG tablet Take 1 tablet (40 mg total) by mouth once daily.    [DISCONTINUED] docusate sodium (COLACE) 100 MG capsule Take 1 capsule (100 mg total) by mouth 2 (two) times daily. (Patient not taking: Reported on 2019)    [DISCONTINUED] pantoprazole (PROTONIX) 40 MG tablet Take 1 tablet (40 mg total) by mouth once daily. (Patient not taking: Reported on 2019)    [DISCONTINUED] sertraline (ZOLOFT) 50 MG tablet Take 1 tablet (50 mg total) by mouth once daily. (Patient not taking: Reported on 2019)    [DISCONTINUED] triamcinolone acetonide 0.1% (KENALOG) 0.1 % cream Apply topically 2 (two) times daily. for 10 days     Family History     Problem Relation (Age of Onset)    COPD Mother    Cancer Mother    Diabetes Daughter    Heart disease Father        Tobacco Use    Smoking status: Former Smoker     Packs/day: 1.00     Years: 15.00     Pack years: 15.00     Last attempt to quit: 2009     Years since quittin.0    Smokeless tobacco: Never Used   Substance and Sexual Activity    Alcohol use: No    Drug use: No     Comment: Is on prescription opiod, no non prescribed use    Sexual activity: Not Currently     Partners: Female     Review of Systems   Constitutional: Negative for activity change, appetite change, chills, diaphoresis and fatigue.   HENT: Negative for congestion, dental problem, ear discharge, ear pain and facial swelling.    Eyes: Negative for pain, discharge and itching.   Respiratory: Positive for cough and shortness of breath. Negative for apnea and chest tightness.    Cardiovascular: Negative for chest pain and leg swelling.   Gastrointestinal: Negative for abdominal distention and abdominal pain.   Endocrine: Negative for cold  intolerance, heat intolerance and polydipsia.   Genitourinary: Negative for difficulty urinating, dysuria and enuresis.   Musculoskeletal: Negative for arthralgias and back pain.   Skin: Negative for color change and pallor.   Allergic/Immunologic: Negative for environmental allergies and food allergies.   Neurological: Negative for dizziness, facial asymmetry, light-headedness and headaches.   Hematological: Negative for adenopathy. Does not bruise/bleed easily.   Psychiatric/Behavioral: Negative for agitation and behavioral problems.     Objective:     Vital Signs (Most Recent):  Temp: 98.2 °F (36.8 °C) (01/13/20 2030)  Pulse: 80 (01/13/20 2240)  Resp: (!) 31 (01/13/20 2240)  BP: (!) 147/70 (01/13/20 2202)  SpO2: 99 % (01/13/20 2240) Vital Signs (24h Range):  Temp:  [98.2 °F (36.8 °C)] 98.2 °F (36.8 °C)  Pulse:  [73-89] 80  Resp:  [22-46] 31  SpO2:  [99 %-100 %] 99 %  BP: (144-173)/(65-87) 147/70     Weight: 88 kg (194 lb)  Body mass index is 25.6 kg/m².    Physical Exam   Constitutional: He is oriented to person, place, and time. He appears well-developed and well-nourished.   HENT:   Head: Normocephalic and atraumatic.   Nose: Nose normal.   Eyes: EOM are normal.   PERRL   Neck: Normal range of motion. Neck supple.   Cardiovascular: Normal rate, regular rhythm and normal heart sounds.   Pulmonary/Chest: Effort normal and breath sounds normal. No respiratory distress. He has no wheezes. He has no rales.   Abdominal: There is no tenderness.   Musculoskeletal: Normal range of motion. He exhibits no edema.   Please see pic   Neurological: He is alert and oriented to person, place, and time.   Skin: Skin is dry.   Nursing note and vitals reviewed.        CRANIAL NERVES     CN III, IV, VI   Extraocular motions are normal.            Significant Labs:   ABGs:   Recent Labs   Lab 01/13/20 2039   PH 7.241*   PCO2 35.8   HCO3 15.4*   POCSATURATED 90*   BE -12     Bilirubin:   Recent Labs   Lab 01/13/20 2037   BILITOT  0.5     Blood Culture: No results for input(s): LABBLOO in the last 48 hours.  BMP:   Recent Labs   Lab 01/13/20 2037   *      K 4.4   *   CO2 13*   BUN 22   CREATININE 2.4*   CALCIUM 7.6*     CBC:   Recent Labs   Lab 01/13/20 2037   WBC 8.94   HGB 9.1*   HCT 30.3*        All pertinent labs within the past 24 hours have been reviewed.    Significant Imaging: I have reviewed and interpreted all pertinent imaging results/findings within the past 24 hours.

## 2020-01-14 NOTE — CONSULTS
01/14/20 1500        Wound 01/14/20 1540 Neuropathic Foot   Date First Assessed/Time First Assessed: 01/14/20 1540   Primary Wound Type: Neuropathic  Side: Left  Location: Foot   Wound Image    Wound WDL ex   Dressing Appearance Moist drainage   Drainage Amount Scant   Drainage Characteristics/Odor Serous   Appearance Pink;Not granulating;Moist   Tissue loss description Full thickness   Wound Length (cm) 2 cm   Wound Width (cm) 2 cm   Wound Depth (cm) 0.2 cm   Wound Volume (cm^3) 0.8 cm^3   Wound Surface Area (cm^2) 4 cm^2   Care Cleansed with:;Sterile normal saline   Dressing Hydrofiber;Foam   Periwound Care Skin barrier film applied   Dressing Change Due 01/17/20     Consulted to this 69 year old male patient for wound to foot. stroke, spinal cord disease, peripheral vascular disease, MI (per patient, 2000 & 2012), lipoma of colon, infection of aortic graft, HTN. Currently admitted with shortness of breath. Patient is awake and alert. Wound noted to his right  transmetatarsal amputation site. Wound measures about 2x2x0.2cm. Moist, pink, non granulating wound bed. Cleansed well with saline and patted dry. periwound painted with cavilon. Hydrofiber applied to wound and secured with foam dressing. Patient reports this wound is chronic. Small tan callus noted to patients right BKA site. Padded with foam for protection. Patient reports he is followed by Dr. Carlson, however per chart review, he hasn't been since since 10/2018. Strongly encouraged patient to follow up with Dr. Carlson on an outpatient basis. He verbalized understanding. Will continue to follow.    Right TMA wound:  1. Cleanse with saline  2. Pat dry  3. Paint periwound with cavilon  4. Apply hydrofiber to wound bed  5. Secure with foam dressing  6. Change every 3-4 days

## 2020-01-14 NOTE — ASSESSMENT & PLAN NOTE
Will need to rule out PE, will check d dimer and will order VQ scan as  CTA of the chest  Cannot be done due to renal impairment >>>>>>>> PE ruled out with negative VQ scan  Will order cardiac echo>>>> pending  Pt was admitted to Telemetry unit on nasal cannula  May require home oxygen eval prior to discharge

## 2020-01-14 NOTE — PLAN OF CARE
CM met with patient and spouse at bedside to assess for discharge needs. Pt states that he lives at home with his spouse and is dependent with some needs. He uses a wheelchair for assistance. He does not have home health at this time. He denies any post discharge needs at this time. CM provided a transitional care folder, information on advanced directives, information on pharmacy bedside delivery, and discharge planning begins on admission with contact information for any needs/questions.    D/C Plan: Home  PCP: Norma Nuñez MD  Preferred Pharmacy: [address]  Discharge transportation: Family  My [facility]: Declined  Pharmacy Bedside Delivery: Yes       01/14/20 1030   Discharge Assessment   Assessment Type Discharge Planning Assessment   Confirmed/corrected address and phone number on facesheet? Yes   Assessment information obtained from? Patient;Caregiver   Expected Length of Stay (days)   (TBD)   Communicated expected length of stay with patient/caregiver yes   Prior to hospitilization cognitive status: Alert/Oriented   Prior to hospitalization functional status: Independent;Assistive Equipment   Current cognitive status: Alert/Oriented   Current Functional Status: Independent;Assistive Equipment   Facility Arrived From: Home   Lives With spouse   Able to Return to Prior Arrangements yes   Is patient able to care for self after discharge? Yes   Who are your caregiver(s) and their phone number(s)? Laury Mansfieldor, Spouse- [phone]   Patient's perception of discharge disposition home or selfcare   Readmission Within the Last 30 Days no previous admission in last 30 days   Patient currently being followed by outpatient case management? No   Patient currently receives any other outside agency services? No   Equipment Currently Used at Home prosthesis;wheelchair   Do you have any problems affording any of your prescribed medications? No   Is the patient taking medications as prescribed? yes   Does the  patient have transportation home? Yes   Transportation Anticipated family or friend will provide   Dialysis Name and Scheduled days NA   Does the patient receive services at the [facility]mad[facility] Clinic? No   Discharge Plan A Home with family   DME Needed Upon Discharge  none   Patient/Family in Agreement with Plan yes

## 2020-01-14 NOTE — H&P
"[facility] Hospital Medicine  History & Physical    Patient Name: Kodi Ribeiro  MRN: [id number]  Admission Date: 1/13/2020  Attending Physician: Nitin Walker Jr., MD   Primary Care Provider: Norma Nuñez MD         Patient information was obtained from patient and ER records.     Subjective:     Principal Problem:<principal problem not specified>    Chief Complaint:   Chief Complaint   Patient presents with    Shortness of Breath        HPI:  69 year old man with extensive past medical and surgery history of    stroke, spinal cord disease, peripheral vascular disease, MI (per patient, 2000 & 2012), lipoma of colon, infection of aortic graft, HTN, HLD, hyperglycemia, horseshoe kidney, hemorrhoids, GERD,  Who presented today with sudden onset of worsening shortness of breath . He went to work this morning and was preparing to watch the [employer] game with the wife when he called the wife to call 911 as he could not breath. Per  EMS: "Pt was sitting on the floor and short of breath, with initial O2 sats around 76. Pt was given 6L O2 via nasal cannula, with O2 sats going up to the mid 80s.He was started on bipap and was given duonebs and solumedrol . He denies any history of long distance travel , leg swelling or chest pain .  Previous documentation showed that [facility] -03/2019-Three vessel coronary artery disease.   2.   Normal LVEF.   3.   Diastolic dysfunction.  Lab review showed BNP-965, d dimer is pending .  Chest x-ray showed pattern of   pulmonary vascular congestion and diffuse coarsening of the interstitium.   ABG showed-PH-7.24,po2-69    Past Medical History:   Diagnosis Date    Acute on chronic congestive heart failure 1/13/2020    Analgesic nephropathy     Anemia     AP (angina pectoris) 1/11/2019    Arthritis     Colon polyp     Repeat colonoscopy due in 9/14    Coronary artery disease     Diverticulosis     colonoscopy 2/21/2014    Encounter for blood transfusion     GERD " (gastroesophageal reflux disease)     Hemorrhoids     colonoscopy 2/21/2014    Horseshoe kidney     Hyperglycemia 3/17/2014    Hyperlipidemia     Hypertension     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    Late complications of amputation stump     rseolved with further amputation( MRSA then none since 2014)    Lipoma of colon     colonoscopy 2/21/2014    Myocardial infarction     per patient 2000 & 9/2012    Peripheral vascular disease     Phantom limb syndrome     patient reports only intermittent not problematic, not worsening    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    Spinal cord disease     L4L5 disc    Stroke     Tobacco dependence     resolved    Ureteral stent retained        Past Surgical History:   Procedure Laterality Date    ABDOMINAL AORTIC ANEURYSM REPAIR      ABDOMINAL AORTIC ANEURYSM REPAIR  1996/2014    AMPUTATION, LOWER LIMB      AORTA - BILATERAL FEMORAL ARTERY BYPASS GRAFT  2014    Left and right leg    CORONARY ANGIOPLASTY WITH STENT PLACEMENT  2000    Three placed in heart    CYSTOSCOPY W/ RETROGRADES Left 5/29/2018    Procedure: CYSTOSCOPY, WITH RETROGRADE PYELOGRAM;  Surgeon: Scooter Jin IV, MD;  Location: [facility] OR;  Service: Urology;  Laterality: Left;    CYSTOSCOPY W/ URETERAL STENT PLACEMENT Left 5/29/2018    Procedure: CYSTOSCOPY, WITH URETERAL STENT INSERTION;  Surgeon: Scooter Jin IV, MD;  Location: [facility] OR;  Service: Urology;  Laterality: Left;    CYSTOSCOPY W/ URETERAL STENT REMOVAL Left 5/29/2018    Procedure: CYSTOSCOPY, WITH URETERAL STENT REMOVAL;  Surgeon: Scooter Jin IV, MD;  Location: [facility] OR;  Service: Urology;  Laterality: Left;    FOOT AMPUTATION THROUGH METATARSAL  1996    left    FOOT SURGERY Bilateral 1980's    per patient multiple toe amputations prior to.  partial foot amputation:first great toe then other toes     KIDNEY SURGERY  2014    per patient separation of horseshoe kidney @ time of AAA repair    LEFT HEART  CATHETERIZATION Left 3/7/2019    Procedure: CATHETERIZATION, HEART, LEFT;  Surgeon: Adriel Boone MD;  Location: [facility] CATH LAB;  Service: Cardiology;  Laterality: Left;  630 admit for IV hydration  10am start    LUNG LOBECTOMY Right 1970s    per patient not cancer    right below knee amputation  2009 (approx)    SMALL INTESTINE SURGERY  2014    per patient partial @ time of aaa repair  not small bowel - large bowel bowel compromised bythtwe AAAbowel    TONSILLECTOMY  1955 aprox    URETERAL STENT PLACEMENT Left     annually replaced since 2012 or so  Dr Jin       Review of patient's allergies indicates:   Allergen Reactions    Morphine Itching       No current facility-administered medications on file prior to encounter.      Current Outpatient Medications on File Prior to Encounter   Medication Sig    amLODIPine (NORVASC) 10 MG tablet TAKE 1 TABLET EVERY DAY    aspirin (ECOTRIN) 81 MG EC tablet Take 1 tablet (81 mg total) by mouth once daily.    carvedilol (COREG) 25 MG tablet Take 1 tablet (25 mg total) by mouth 2 (two) times daily with meals.    cetirizine (ZYRTEC) 10 MG tablet Take 10 mg by mouth once daily.    clopidogrel (PLAVIX) 75 mg tablet TAKE 1 TABLET EVERY DAY    isosorbide mononitrate (IMDUR) 60 MG 24 hr tablet Take 2 tablets (120 mg total) by mouth once daily.    losartan (COZAAR) 100 MG tablet Take 1 tablet (100 mg total) by mouth once daily.    omeprazole (PRILOSEC) 20 MG capsule TAKE 1 CAPSULE TWICE DAILY    mupirocin (BACTROBAN) 2 % ointment Apply topically 3 (three) times daily.    nitroglycerin (NITROSTAT) 0.6 MG Subl Place 1 tablet (0.6 mg total) under the tongue every 5 (five) minutes as needed (max 3/ per episode).    oxyCODONE-acetaminophen (PERCOCET) 5-325 mg per tablet Take 1 tablet by mouth every 6 (six) hours as needed for Pain.    [DISCONTINUED] atorvastatin (LIPITOR) 40 MG tablet Take 1 tablet (40 mg total) by mouth once daily.    [DISCONTINUED] docusate sodium  (COLACE) 100 MG capsule Take 1 capsule (100 mg total) by mouth 2 (two) times daily. (Patient not taking: Reported on 2019)    [DISCONTINUED] pantoprazole (PROTONIX) 40 MG tablet Take 1 tablet (40 mg total) by mouth once daily. (Patient not taking: Reported on 2019)    [DISCONTINUED] sertraline (ZOLOFT) 50 MG tablet Take 1 tablet (50 mg total) by mouth once daily. (Patient not taking: Reported on 2019)    [DISCONTINUED] triamcinolone acetonide 0.1% (KENALOG) 0.1 % cream Apply topically 2 (two) times daily. for 10 days     Family History     Problem Relation (Age of Onset)    COPD Mother    Cancer Mother    Diabetes Daughter    Heart disease Father        Tobacco Use    Smoking status: Former Smoker     Packs/day: 1.00     Years: 15.00     Pack years: 15.00     Last attempt to quit: 2009     Years since quittin.0    Smokeless tobacco: Never Used   Substance and Sexual Activity    Alcohol use: No    Drug use: No     Comment: Is on prescription opiod, no non prescribed use    Sexual activity: Not Currently     Partners: Female     Review of Systems   Constitutional: Negative for activity change, appetite change, chills, diaphoresis and fatigue.   HENT: Negative for congestion, dental problem, ear discharge, ear pain and facial swelling.    Eyes: Negative for pain, discharge and itching.   Respiratory: Positive for cough and shortness of breath. Negative for apnea and chest tightness.    Cardiovascular: Negative for chest pain and leg swelling.   Gastrointestinal: Negative for abdominal distention and abdominal pain.   Endocrine: Negative for cold intolerance, heat intolerance and polydipsia.   Genitourinary: Negative for difficulty urinating, dysuria and enuresis.   Musculoskeletal: Negative for arthralgias and back pain.   Skin: Negative for color change and pallor.   Allergic/Immunologic: Negative for environmental allergies and food allergies.   Neurological: Negative for dizziness,  facial asymmetry, light-headedness and headaches.   Hematological: Negative for adenopathy. Does not bruise/bleed easily.   Psychiatric/Behavioral: Negative for agitation and behavioral problems.     Objective:     Vital Signs (Most Recent):  Temp: 98.2 °F (36.8 °C) (01/13/20 2030)  Pulse: 80 (01/13/20 2240)  Resp: (!) 31 (01/13/20 2240)  BP: (!) 147/70 (01/13/20 2202)  SpO2: 99 % (01/13/20 2240) Vital Signs (24h Range):  Temp:  [98.2 °F (36.8 °C)] 98.2 °F (36.8 °C)  Pulse:  [73-89] 80  Resp:  [22-46] 31  SpO2:  [99 %-100 %] 99 %  BP: (144-173)/(65-87) 147/70     Weight: 88 kg (194 lb)  Body mass index is 25.6 kg/m².    Physical Exam   Constitutional: He is oriented to person, place, and time. He appears well-developed and well-nourished.   HENT:   Head: Normocephalic and atraumatic.   Nose: Nose normal.   Eyes: EOM are normal.   PERRL   Neck: Normal range of motion. Neck supple.   Cardiovascular: Normal rate, regular rhythm and normal heart sounds.   Pulmonary/Chest: Effort normal and breath sounds normal. No respiratory distress. He has no wheezes. He has no rales.   Abdominal: There is no tenderness.   Musculoskeletal: Normal range of motion. He exhibits no edema.   Please see pic   Neurological: He is alert and oriented to person, place, and time.   Skin: Skin is dry.   Nursing note and vitals reviewed.        CRANIAL NERVES     CN III, IV, VI   Extraocular motions are normal.            Significant Labs:   ABGs:   Recent Labs   Lab 01/13/20 2039   PH 7.241*   PCO2 35.8   HCO3 15.4*   POCSATURATED 90*   BE -12     Bilirubin:   Recent Labs   Lab 01/13/20 2037   BILITOT 0.5     Blood Culture: No results for input(s): LABBLOO in the last 48 hours.  BMP:   Recent Labs   Lab 01/13/20 2037   *      K 4.4   *   CO2 13*   BUN 22   CREATININE 2.4*   CALCIUM 7.6*     CBC:   Recent Labs   Lab 01/13/20  2037   WBC 8.94   HGB 9.1*   HCT 30.3*        All pertinent labs within the past 24 hours  have been reviewed.    Significant Imaging: I have reviewed and interpreted all pertinent imaging results/findings within the past 24 hours.    Assessment/Plan:     SOB (shortness of breath)    Could be related to diastolic CHF , was given lasix in the ED, will need to rule out PE, no wheezing at this time .  Will follow serial  troponin  ,will give one dose of lovenox 1mg/kg now      Hypoxemia    Will need to rule out PE, will check d dimer and will order VQ scan as  CTA of the chest  Cannot be done due to renal impairment  .  Will order cardiac echo   Continue bipap,pulmonology consult     Acute diastolic congestive heart failure    Will continue lasix, BNP is 965, will order cardiac echo .  On bipap    CAD;Old MI ; s/p stents x 3 (2000)     Will continue aspirin/plavix , imdur.    CKD (chronic kidney disease) stage 3, GFR 30-59 ml/min    Will avoid nephrotoxic meds , monitor closely     Essential hypertension    Will continue losartan, hydralazine prn      VTE Risk Mitigation (From admission, onward)    None             Lavell Waldrop MD  Department of Hospital Medicine   [facility]

## 2020-01-14 NOTE — HPI
"Kodi Ribeiro is a 69 year old male who presents to [facility] due to sudden onset of shortness of breath. Per EMS " patient was sitting on the floor and short of breath with initial O2 sats around 76%, patient was given 6L O2 via NC with O2 sats up to the mid 80s." He was started on Bipap and given Duonebs and Steroids. His current medical conditions include HTN, HLP, Old MI, PAD s/p right BKA and left forefoot amputation, Carotid artery disease s/p  Left CEA, CKD Stage III-IV, CAD s/p PCI in 2000. Of note, patient is followed by Dr. Mancia and was last seen a few months ago and opted for medical management over PCI due to ongoing issues with CKD. He was deemed not a surgical candidate for CABG per Dr. Ibarra. He had PET stress which showed a small sized, mild intensity mid to apical anteroseptal stress induced perfusion abnormality in distribution of mid LAD and small sized abnormality in distribution of RCA.     ED workup revealed , Troponin negative x3, Mag 1.2, K+ 4.4, Cr 2.4, D Dimer 4.35, H/H 9.1/29.8. VQ scan revealed low probability for PE. CXR revealed a pattern of pulmonary vascular congestion and diffuse coarsening of interstitium. ECHO pending. Patient admitted to Telemetry under the care of [facility] medicine. Cardiology consulted to assist with medical management. Chart reviewed, patient seen and examined.     Denies chest pain or anginal equivalents on exam today. Feels much better on exam today. Reports noncompliance with dietary restrictions on a daily basis and eats excess salt. NO shortness of breath, MACKEY or palpitations. Trace LLE edema today on exam. Denies orthopnea, PND or abdominal bloating today.        "

## 2020-01-14 NOTE — ASSESSMENT & PLAN NOTE
Could be related to diastolic CHF , was given lasix in the ED, will need to rule out PE, no wheezing at this time .  Will follow seral troponin

## 2020-01-14 NOTE — ED PROVIDER NOTES
SCRIBE #1 NOTE: I, Leonardoshalom Pathak, am scribing for, and in the presence of, Nitin Walker Jr., MD. I have scribed the entire note.       History     Chief Complaint   Patient presents with    Shortness of Breath     Review of patient's allergies indicates:   Allergen Reactions    Morphine Itching         History of Present Illness     HPI    1/13/2020, 8:30 PM  History obtained from the spouse, patient, and EMS      History of Present Illness: Kodi Ribeiro is a 69 y.o. male patient with a PMHx of stroke, spinal cord disease, peripheral vascular disease, MI (per patient, 2000 & 2012), lipoma of colon, infection of aortic graft, HTN, HLD, hyperglycemia, horseshoe kidney, hemorrhoids, GERD, diverticulosis, CAD, colon polyp, arthritis, angina pectoris, anemia, and analgesic nephropathy who presents to the Emergency Department for evaluation of shortness of breath which is chronic but exacerbated suddenly immediately PTA. Per EMS: Pt was sitting on the floor and short of breath, with initial O2 sats around 76. Pt was given 6L O2 via nasal cannula, with O2 sats going up to the mid 80s. Pt was tripoding, so EMS then put pt on a BiPap, which--along with 2 breathing treatments, duoneb, and 125 mg solumedrol--got pt's O2 sats up to roughly 98. Symptoms are constant and moderate in severity. No mitigating or exacerbating factors reported. Associated sxs include abdominal pain. Patient denies any fever, chills, leg swelling, CP, cough, N/V/D, congestion, rhinorrhea, sore throat, HA, dizziness, and all other sxs at this time. Prior Tx includes BiPAP, 2 breathing treatments, duoneb, and 125 mg solumedrol with moderate sx improvement. No further complaints or concerns at this time.       Arrival mode: EMS    PCP: Norma Nuñez MD        Past Medical History:  Past Medical History:   Diagnosis Date    Acute on chronic congestive heart failure 1/13/2020    Analgesic nephropathy     Anemia     AP (angina pectoris)  1/11/2019    Arthritis     Colon polyp     Repeat colonoscopy due in 9/14    Coronary artery disease     Diverticulosis     colonoscopy 2/21/2014    Encounter for blood transfusion     GERD (gastroesophageal reflux disease)     Hemorrhoids     colonoscopy 2/21/2014    Horseshoe kidney     Hyperglycemia 3/17/2014    Hyperlipidemia     Hypertension     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    Late complications of amputation stump     rseolved with further amputation( MRSA then none since 2014)    Lipoma of colon     colonoscopy 2/21/2014    Myocardial infarction     per patient 2000 & 9/2012    Peripheral vascular disease     Phantom limb syndrome     patient reports only intermittent not problematic, not worsening    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    Spinal cord disease     L4L5 disc    Stroke     Tobacco dependence     resolved    Ureteral stent retained        Past Surgical History:  Past Surgical History:   Procedure Laterality Date    ABDOMINAL AORTIC ANEURYSM REPAIR      ABDOMINAL AORTIC ANEURYSM REPAIR  1996/2014    AMPUTATION, LOWER LIMB      AORTA - BILATERAL FEMORAL ARTERY BYPASS GRAFT  2014    Left and right leg    CORONARY ANGIOPLASTY WITH STENT PLACEMENT  2000    Three placed in heart    CYSTOSCOPY W/ RETROGRADES Left 5/29/2018    Procedure: CYSTOSCOPY, WITH RETROGRADE PYELOGRAM;  Surgeon: Scooter Jin IV, MD;  Location: [facility] OR;  Service: Urology;  Laterality: Left;    CYSTOSCOPY W/ URETERAL STENT PLACEMENT Left 5/29/2018    Procedure: CYSTOSCOPY, WITH URETERAL STENT INSERTION;  Surgeon: Scooter Jin IV, MD;  Location: [facility] OR;  Service: Urology;  Laterality: Left;    CYSTOSCOPY W/ URETERAL STENT REMOVAL Left 5/29/2018    Procedure: CYSTOSCOPY, WITH URETERAL STENT REMOVAL;  Surgeon: Scooter Jin IV, MD;  Location: [facility] OR;  Service: Urology;  Laterality: Left;    FOOT AMPUTATION THROUGH METATARSAL  1996    left    FOOT SURGERY Bilateral 1980's     per patient multiple toe amputations prior to.  partial foot amputation:first great toe then other toes     KIDNEY SURGERY      per patient separation of horseshoe kidney @ time of AAA repair    LEFT HEART CATHETERIZATION Left 3/7/2019    Procedure: CATHETERIZATION, HEART, LEFT;  Surgeon: Adriel Boone MD;  Location: [facility] CATH LAB;  Service: Cardiology;  Laterality: Left;  630 admit for IV hydration  10am start    LUNG LOBECTOMY Right     per patient not cancer    right below knee amputation   (approx)    SMALL INTESTINE SURGERY      per patient partial @ time of aaa repair  not small bowel - large bowel bowel compromised bythtwe AAAbowel    TONSILLECTOMY   aprox    URETERAL STENT PLACEMENT Left     annually replaced since  or so  Dr Jin         Family History:  Family History   Problem Relation Age of Onset    Cancer Mother         lung    COPD Mother     Heart disease Father         MI but per patient bc of old age    Diabetes Daughter     Eczema Neg Hx     Lupus Neg Hx     Psoriasis Neg Hx     Melanoma Neg Hx     Kidney disease Neg Hx     Stroke Neg Hx     Mental illness Neg Hx     Mental retardation Neg Hx     Hypertension Neg Hx     Hyperlipidemia Neg Hx     Drug abuse Neg Hx     Alcohol abuse Neg Hx     Depression Neg Hx        Social History:  Social History     Tobacco Use    Smoking status: Former Smoker     Packs/day: 1.00     Years: 15.00     Pack years: 15.00     Last attempt to quit: 2009     Years since quittin.0    Smokeless tobacco: Never Used   Substance and Sexual Activity    Alcohol use: No    Drug use: No     Comment: Is on prescription opiod, no non prescribed use    Sexual activity: Not Currently     Partners: Female        Review of Systems     Review of Systems   Constitutional: Negative for chills and fever.   HENT: Negative for congestion, rhinorrhea and sore throat.    Respiratory: Positive for shortness of breath.  Negative for cough.    Cardiovascular: Negative for chest pain and leg swelling.   Gastrointestinal: Positive for abdominal pain. Negative for diarrhea, nausea and vomiting.   Genitourinary: Negative for dysuria.   Musculoskeletal: Negative for back pain, neck pain and neck stiffness.   Skin: Negative for rash and wound.   Neurological: Negative for dizziness, weakness, light-headedness, numbness and headaches.   Hematological: Does not bruise/bleed easily.   All other systems reviewed and are negative.     Physical Exam     Initial Vitals [01/13/20 2030]   BP Pulse Resp Temp SpO2   (!) 173/87 89 (!) 44 98.2 °F (36.8 °C) 99 %      MAP       --          Physical Exam  Nursing Notes and Vital Signs Reviewed.  Constitutional: Patient is in no acute distress. Well-developed and well-nourished.  Head: Atraumatic. Normocephalic.  Eyes: PERRL. EOM intact. Conjunctivae are not pale. No scleral icterus.  ENT: Mucous membranes are moist. Oropharynx is clear and symmetric.    Neck: Supple. Full ROM. No lymphadenopathy.  Cardiovascular: Regular rate. Regular rhythm. No murmurs, rubs, or gallops. Distal pulses are 2+ and symmetric.  Pulmonary/Chest: Pt currently on BiPAP. There is bilateral wheezing and crackles.  Abdominal: Soft and non-distended.  There is no tenderness.  No rebound, guarding, or rigidity. Good bowel sounds.  Genitourinary: No CVA tenderness  Musculoskeletal: Moves all extremities. No edema. No calf tenderness. Left midfoot amputation with a healing ulcer. Right AKA, no ulcers.  Skin: Warm and dry.  Neurological:  Alert, awake, and appropriate.  Normal speech.  No acute focal neurological deficits are appreciated.  Psychiatric: Normal affect. Good eye contact. Appropriate in content.     ED Course   Critical Care  Date/Time: 1/13/2020 8:50 PM  Performed by: Nitin Walker Jr., MD  Authorized by: Nitin Walker Jr., MD   Direct patient critical care time: 25 minutes  Additional history critical care time: 10  minutes  Ordering / reviewing critical care time: 20 minutes  Documentation critical care time: 10 minutes  Consulting other physicians critical care time: 10 minutes  Total critical care time (exclusive of procedural time) : 75 minutes  Critical care time was exclusive of separately billable procedures and treating other patients and teaching time.  Critical care was necessary to treat or prevent imminent or life-threatening deterioration of the following conditions: respiratory failure (Hypoxemia).  Critical care was time spent personally by me on the following activities: blood draw for specimens, development of treatment plan with patient or surrogate, discussions with consultants, interpretation of cardiac output measurements, evaluation of patient's response to treatment, examination of patient, ordering and performing treatments and interventions, obtaining history from patient or surrogate, ordering and review of laboratory studies, ordering and review of radiographic studies, pulse oximetry, re-evaluation of patient's condition and review of old charts.        ED Vital Signs:  Vitals:    01/13/20 2027 01/13/20 2030 01/13/20 2041 01/13/20 2043   BP:  (!) 173/87  (!) 169/74   Pulse:  89 85 85   Resp:  (!) 44 (!) 22 (!) 46   Temp:  98.2 °F (36.8 °C)     TempSrc:  Oral     SpO2:  99% 100% 100%   Weight: 88 kg (194 lb)       01/13/20 2102 01/13/20 2131 01/13/20 2202 01/13/20 2240   BP: (!) 144/65 (!) 155/72 (!) 147/70    Pulse: 74 75 73 80   Resp: (!) 24 (!) 31 (!) 24 (!) 31   Temp:       TempSrc:       SpO2: 100% 100% 99% 99%   Weight:        01/13/20 2300 01/13/20 2302 01/14/20 0001 01/14/20 0003   BP: (!) 159/74   (!) 156/72   Pulse: 75 74 82 75   Resp: (!) 28 17 (!) 33 20   Temp:       TempSrc:       SpO2: 100% 99% 100% 100%   Weight:        01/14/20 0030   BP: (!) 152/74   Pulse: 75   Resp: 20   Temp:    TempSrc:    SpO2: 100%   Weight:        Abnormal Lab Results:  Labs Reviewed   CBC W/ AUTO DIFFERENTIAL  - Abnormal; Notable for the following components:       Result Value    RBC 3.29 (*)     Hemoglobin 9.1 (*)     Hematocrit 30.3 (*)     Mean Corpuscular Hemoglobin Conc 30.0 (*)     RDW 15.2 (*)     Gran% 73.8 (*)     Lymph% 15.7 (*)     All other components within normal limits   COMPREHENSIVE METABOLIC PANEL - Abnormal; Notable for the following components:    Chloride 114 (*)     CO2 13 (*)     Glucose 167 (*)     Creatinine 2.4 (*)     Calcium 7.6 (*)     Alkaline Phosphatase 187 (*)     ALT 8 (*)     eGFR if  30 (*)     eGFR if non  26 (*)     All other components within normal limits   B-TYPE NATRIURETIC PEPTIDE - Abnormal; Notable for the following components:     (*)     All other components within normal limits   D DIMER, QUANTITATIVE - Abnormal; Notable for the following components:    D-Dimer 4.35 (*)     All other components within normal limits   ISTAT PROCEDURE - Abnormal; Notable for the following components:    POC PH 7.241 (*)     POC PO2 69 (*)     POC HCO3 15.4 (*)     POC SATURATED O2 90 (*)     All other components within normal limits   HEPATITIS C ANTIBODY   TROPONIN I   TROPONIN I        All Lab Results:  Results for orders placed or performed during the hospital encounter of 01/13/20   Hepatitis C antibody   Result Value Ref Range    Hepatitis C Ab Negative Negative   CBC auto differential   Result Value Ref Range    WBC 8.94 3.90 - 12.70 K/uL    RBC 3.29 (L) 4.60 - 6.20 M/uL    Hemoglobin 9.1 (L) 14.0 - 18.0 g/dL    Hematocrit 30.3 (L) 40.0 - 54.0 %    Mean Corpuscular Volume 92 82 - 98 fL    Mean Corpuscular Hemoglobin 27.7 27.0 - 31.0 pg    Mean Corpuscular Hemoglobin Conc 30.0 (L) 32.0 - 36.0 g/dL    RDW 15.2 (H) 11.5 - 14.5 %    Platelets 245 150 - 350 K/uL    MPV 10.5 9.2 - 12.9 fL    Immature Granulocytes 0.3 0.0 - 0.5 %    Gran # (ANC) 6.6 1.8 - 7.7 K/uL    Immature Grans (Abs) 0.03 0.00 - 0.04 K/uL    Lymph # 1.4 1.0 - 4.8 K/uL    Mono # 0.6  0.3 - 1.0 K/uL    Eos # 0.3 0.0 - 0.5 K/uL    Baso # 0.09 0.00 - 0.20 K/uL    nRBC 0 0 /100 WBC    Gran% 73.8 (H) 38.0 - 73.0 %    Lymph% 15.7 (L) 18.0 - 48.0 %    Mono% 6.2 4.0 - 15.0 %    Eosinophil% 3.0 0.0 - 8.0 %    Basophil% 1.0 0.0 - 1.9 %    Differential Method Automated    Comprehensive metabolic panel   Result Value Ref Range    Sodium 140 136 - 145 mmol/L    Potassium 4.4 3.5 - 5.1 mmol/L    Chloride 114 (H) 95 - 110 mmol/L    CO2 13 (L) 23 - 29 mmol/L    Glucose 167 (H) 70 - 110 mg/dL    BUN, Bld 22 8 - 23 mg/dL    Creatinine 2.4 (H) 0.5 - 1.4 mg/dL    Calcium 7.6 (L) 8.7 - 10.5 mg/dL    Total Protein 7.9 6.0 - 8.4 g/dL    Albumin 3.7 3.5 - 5.2 g/dL    Total Bilirubin 0.5 0.1 - 1.0 mg/dL    Alkaline Phosphatase 187 (H) 55 - 135 U/L    AST 10 10 - 40 U/L    ALT 8 (L) 10 - 44 U/L    Anion Gap 13 8 - 16 mmol/L    eGFR if African American 30 (A) >60 mL/min/1.73 m^2    eGFR if non African American 26 (A) >60 mL/min/1.73 m^2   Troponin I #1   Result Value Ref Range    Troponin I <0.006 0.000 - 0.026 ng/mL   B-Type natriuretic peptide (BNP)   Result Value Ref Range     (H) 0 - 99 pg/mL   Troponin I #2   Result Value Ref Range    Troponin I 0.024 0.000 - 0.026 ng/mL   D dimer, quantitative   Result Value Ref Range    D-Dimer 4.35 (H) <0.50 mg/L FEU   ISTAT PROCEDURE   Result Value Ref Range    POC PH 7.241 (LL) 7.35 - 7.45    POC PCO2 35.8 35 - 45 mmHg    POC PO2 69 (L) 80 - 100 mmHg    POC HCO3 15.4 (L) 24 - 28 mmol/L    POC BE -12 -2 to 2 mmol/L    POC SATURATED O2 90 (L) 95 - 100 %    Rate 16     Sample ARTERIAL     Site RR     Allens Test Pass     DelSys CPAP/BiPAP     Mode BiPAP     FiO2 50     IP 14     EP 6        Imaging Results:  Imaging Results          NM Lung Scan Ventilation Perfusion (In process)                X-Ray Chest AP Portable (Final result)  Result time 01/13/20 21:44:43    Final result by Alfred Mcdaniel MD (01/13/20 21:44:43)                 Impression:      Abnormal chest x-ray.   See discussion above.      Electronically signed by: Alfred Mcdaniel MD  Date:    01/13/2020  Time:    21:44             Narrative:    EXAMINATION:  XR CHEST AP PORTABLE    CLINICAL HISTORY:  Shortness of breath    FINDINGS:  Comparison study 04/03/2019.  Normal size heart.  Aortic arch calcification.  Again, there is a pattern of pulmonary vascular congestion and diffuse coarsening of the interstitium.  Right mid chest surgical staple line with adjacent discoid opacity, fissural fluid versus atelectasis, less likely active infiltrate.  Given the interstitial coarsening and vascular congestion, can not exclude interstitial edema.  There is peripheral left lung base opacity, concerning for infiltrate/pneumonia.  Less notable opacity peripheral right lung base with associated costophrenic angle blunting, pleural thickening versus pleural fluid, same as before.                                 The EKG was ordered, reviewed, and independently interpreted by the ED provider.  Interpretation time: 2028  Rate: 87 BPM  Rhythm: normal sinus rhythm  Interpretation: No acute ST changes. No STEMI.           The Emergency Provider reviewed the vital signs and test results, which are outlined above.     ED Discussion     10:52 PM: Discussed case with Lavell Waldrop MD ([facility]). Dr. Waldrop agrees with current care and management of pt and accepts admission.   Admitting Service: [facility]  Admitting Physician: Dr. Waldrop  Admit to: IP/ICU    10:56 PM: Re-evaluated pt. I have discussed test results, shared treatment plan, and the need for admission with patient and family at bedside. Pt and family express understanding at this time and agree with all information. All questions answered. Pt and family have no further questions or concerns at this time. Pt is ready for admit.       Medical Decision Making:   Clinical Tests:   Lab Tests: Ordered and Reviewed  Radiological Study: Ordered and Reviewed  Medical Tests: Ordered  and Reviewed           ED Medication(s):  Medications   amLODIPine tablet 10 mg (has no administration in time range)   aspirin EC tablet 81 mg (has no administration in time range)   carvedilol tablet 25 mg (has no administration in time range)   clopidogrel tablet 75 mg (has no administration in time range)   isosorbide mononitrate 24 hr tablet 120 mg (has no administration in time range)   losartan tablet 100 mg (has no administration in time range)   nitroGLYCERIN SL tablet 0.4 mg (has no administration in time range)   pantoprazole EC tablet 40 mg (has no administration in time range)   oxyCODONE-acetaminophen 5-325 mg per tablet 1 tablet (has no administration in time range)   sodium chloride 0.9% flush 10 mL (has no administration in time range)   glucose chewable tablet 16 g (has no administration in time range)   glucose chewable tablet 24 g (has no administration in time range)   glucagon (human recombinant) injection 1 mg (has no administration in time range)   enoxaparin injection 90 mg (has no administration in time range)   dextrose 10% (D10W) Bolus (has no administration in time range)   dextrose 10% (D10W) Bolus (has no administration in time range)   albuterol-ipratropium 2.5 mg-0.5 mg/3 mL nebulizer solution 3 mL (3 mLs Nebulization Given 1/13/20 2041)   furosemide injection 40 mg (40 mg Intravenous Given 1/13/20 2146)       New Prescriptions    No medications on file               Scribe Attestation:   Scribe #1: I performed the above scribed service and the documentation accurately describes the services I performed. I attest to the accuracy of the note.     Attending:   Physician Attestation Statement for Scribe #1: I, Nitin Walker Jr., MD, personally performed the services described in this documentation, as scribed by Leonardo Pathak, in my presence, and it is both accurate and complete.           Clinical Impression       ICD-10-CM ICD-9-CM   1. Hypoxemia R09.02 799.02   2. SOB (shortness of  breath) R06.02 786.05   3. Acute on chronic congestive heart failure, unspecified heart failure type I50.9 428.0   4. Renal dysfunction N28.9 593.9       Disposition:   Disposition: Admitted  [facility] ED DISP CONDITION: Guarded.        Nitin Walker Jr., MD  01/14/20 0114

## 2020-01-14 NOTE — ASSESSMENT & PLAN NOTE
Will need to rule out PE, will check d dimer and will order VQ scan as  CTA of the chest  Cannot be done due to renal impairment  .  Will order cardiac echo   Continue bipap,pulmonology consult

## 2020-01-14 NOTE — NURSING
Received pt from ED to 227 via stretcher. AAOX3. VSS. Assessment per flowsheet. Oriented to unit and POC. Pt verbalized understanding. Fall precautions in place. Safety intact.

## 2020-01-14 NOTE — ASSESSMENT & PLAN NOTE
-Continue IV lasix  -Continue Coreg, Norvasc, ARB, Imdur  -Dash diet, 2 gm sodium restriction  -1500 ml fluid restriction  -Daily weights  -Strict intake and output  -Recommend dietary consult to reinforce dietary restrictions  -Reinforced compliance with dietary restrictions   -ECHO pending  -Reassess in AM

## 2020-01-15 VITALS
TEMPERATURE: 98 F | BODY MASS INDEX: 22.29 KG/M2 | HEART RATE: 62 BPM | OXYGEN SATURATION: 94 % | SYSTOLIC BLOOD PRESSURE: 136 MMHG | DIASTOLIC BLOOD PRESSURE: 61 MMHG | WEIGHT: 168.19 LBS | RESPIRATION RATE: 18 BRPM | HEIGHT: 73 IN

## 2020-01-15 PROBLEM — R09.02 HYPOXEMIA: Status: RESOLVED | Noted: 2020-01-13 | Resolved: 2020-01-15

## 2020-01-15 PROBLEM — J96.01 ACUTE RESPIRATORY FAILURE WITH HYPOXIA: Status: RESOLVED | Noted: 2020-01-14 | Resolved: 2020-01-15

## 2020-01-15 PROBLEM — R06.02 SOB (SHORTNESS OF BREATH): Status: RESOLVED | Noted: 2020-01-13 | Resolved: 2020-01-15

## 2020-01-15 PROBLEM — R23.9 ALTERATION IN SKIN INTEGRITY: Status: RESOLVED | Noted: 2020-01-14 | Resolved: 2020-01-15

## 2020-01-15 LAB
ALBUMIN SERPL BCP-MCNC: 3.5 G/DL (ref 3.5–5.2)
ALP SERPL-CCNC: 147 U/L (ref 55–135)
ALT SERPL W/O P-5'-P-CCNC: 6 U/L (ref 10–44)
ANION GAP SERPL CALC-SCNC: 12 MMOL/L (ref 8–16)
AST SERPL-CCNC: 9 U/L (ref 10–40)
BASOPHILS # BLD AUTO: 0 K/UL (ref 0–0.2)
BASOPHILS NFR BLD: 0 % (ref 0–1.9)
BILIRUB SERPL-MCNC: 0.5 MG/DL (ref 0.1–1)
BUN SERPL-MCNC: 37 MG/DL (ref 8–23)
CALCIUM SERPL-MCNC: 8.1 MG/DL (ref 8.7–10.5)
CHLORIDE SERPL-SCNC: 108 MMOL/L (ref 95–110)
CO2 SERPL-SCNC: 16 MMOL/L (ref 23–29)
CREAT SERPL-MCNC: 2.4 MG/DL (ref 0.5–1.4)
DIFFERENTIAL METHOD: ABNORMAL
EOSINOPHIL # BLD AUTO: 0 K/UL (ref 0–0.5)
EOSINOPHIL NFR BLD: 0 % (ref 0–8)
ERYTHROCYTE [DISTWIDTH] IN BLOOD BY AUTOMATED COUNT: 15.5 % (ref 11.5–14.5)
EST. GFR  (AFRICAN AMERICAN): 30 ML/MIN/1.73 M^2
EST. GFR  (NON AFRICAN AMERICAN): 26 ML/MIN/1.73 M^2
GLUCOSE SERPL-MCNC: 131 MG/DL (ref 70–110)
HCT VFR BLD AUTO: 25.4 % (ref 40–54)
HGB BLD-MCNC: 7.7 G/DL (ref 14–18)
IMM GRANULOCYTES # BLD AUTO: 0.04 K/UL (ref 0–0.04)
IMM GRANULOCYTES NFR BLD AUTO: 0.6 % (ref 0–0.5)
LYMPHOCYTES # BLD AUTO: 0.6 K/UL (ref 1–4.8)
LYMPHOCYTES NFR BLD: 8.6 % (ref 18–48)
MAGNESIUM SERPL-MCNC: 1.4 MG/DL (ref 1.6–2.6)
MCH RBC QN AUTO: 27.7 PG (ref 27–31)
MCHC RBC AUTO-ENTMCNC: 30.3 G/DL (ref 32–36)
MCV RBC AUTO: 91 FL (ref 82–98)
MONOCYTES # BLD AUTO: 0.4 K/UL (ref 0.3–1)
MONOCYTES NFR BLD: 5.9 % (ref 4–15)
NEUTROPHILS # BLD AUTO: 6 K/UL (ref 1.8–7.7)
NEUTROPHILS NFR BLD: 84.9 % (ref 38–73)
NRBC BLD-RTO: 0 /100 WBC
PHOSPHATE SERPL-MCNC: 3.4 MG/DL (ref 2.7–4.5)
PLATELET # BLD AUTO: 214 K/UL (ref 150–350)
PMV BLD AUTO: 10.9 FL (ref 9.2–12.9)
POTASSIUM SERPL-SCNC: 4.5 MMOL/L (ref 3.5–5.1)
PROT SERPL-MCNC: 7.1 G/DL (ref 6–8.4)
RBC # BLD AUTO: 2.78 M/UL (ref 4.6–6.2)
SODIUM SERPL-SCNC: 136 MMOL/L (ref 136–145)
WBC # BLD AUTO: 7 K/UL (ref 3.9–12.7)

## 2020-01-15 PROCEDURE — 94761 N-INVAS EAR/PLS OXIMETRY MLT: CPT | Mod: HCNC

## 2020-01-15 PROCEDURE — 83735 ASSAY OF MAGNESIUM: CPT | Mod: HCNC

## 2020-01-15 PROCEDURE — 25000003 PHARM REV CODE 250: Mod: HCNC | Performed by: INTERNAL MEDICINE

## 2020-01-15 PROCEDURE — 94640 AIRWAY INHALATION TREATMENT: CPT | Mod: HCNC

## 2020-01-15 PROCEDURE — 63600175 PHARM REV CODE 636 W HCPCS: Mod: HCNC | Performed by: NURSE PRACTITIONER

## 2020-01-15 PROCEDURE — 36415 COLL VENOUS BLD VENIPUNCTURE: CPT | Mod: HCNC

## 2020-01-15 PROCEDURE — 80053 COMPREHEN METABOLIC PANEL: CPT | Mod: HCNC

## 2020-01-15 PROCEDURE — 27000221 HC OXYGEN, UP TO 24 HOURS: Mod: HCNC

## 2020-01-15 PROCEDURE — 25000242 PHARM REV CODE 250 ALT 637 W/ HCPCS: Mod: HCNC | Performed by: INTERNAL MEDICINE

## 2020-01-15 PROCEDURE — 85025 COMPLETE CBC W/AUTO DIFF WBC: CPT | Mod: HCNC

## 2020-01-15 PROCEDURE — 99232 PR SUBSEQUENT HOSPITAL CARE,LEVL II: ICD-10-PCS | Mod: HCNC,,, | Performed by: INTERNAL MEDICINE

## 2020-01-15 PROCEDURE — 84100 ASSAY OF PHOSPHORUS: CPT | Mod: HCNC

## 2020-01-15 PROCEDURE — 99232 SBSQ HOSP IP/OBS MODERATE 35: CPT | Mod: HCNC,,, | Performed by: INTERNAL MEDICINE

## 2020-01-15 PROCEDURE — 25000003 PHARM REV CODE 250: Mod: HCNC | Performed by: NURSE PRACTITIONER

## 2020-01-15 RX ORDER — LANOLIN ALCOHOL/MO/W.PET/CERES
400 CREAM (GRAM) TOPICAL 2 TIMES DAILY
Refills: 0 | COMMUNITY
Start: 2020-01-15 | End: 2020-01-17 | Stop reason: ALTCHOICE

## 2020-01-15 RX ORDER — AZITHROMYCIN 500 MG/1
500 TABLET, FILM COATED ORAL DAILY
Qty: 7 TABLET | Refills: 0 | Status: SHIPPED | OUTPATIENT
Start: 2020-01-15 | End: 2020-01-22

## 2020-01-15 RX ORDER — FUROSEMIDE 20 MG/1
20 TABLET ORAL DAILY
Qty: 30 TABLET | Refills: 1 | Status: ON HOLD | OUTPATIENT
Start: 2020-01-15 | End: 2020-02-06 | Stop reason: SDUPTHER

## 2020-01-15 RX ORDER — AZITHROMYCIN 250 MG/1
250 TABLET, FILM COATED ORAL DAILY
Status: DISCONTINUED | OUTPATIENT
Start: 2020-01-15 | End: 2020-01-15 | Stop reason: HOSPADM

## 2020-01-15 RX ADMIN — AMLODIPINE BESYLATE 10 MG: 10 TABLET ORAL at 08:01

## 2020-01-15 RX ADMIN — ISOSORBIDE MONONITRATE 120 MG: 60 TABLET, EXTENDED RELEASE ORAL at 08:01

## 2020-01-15 RX ADMIN — IPRATROPIUM BROMIDE AND ALBUTEROL SULFATE 3 ML: .5; 3 SOLUTION RESPIRATORY (INHALATION) at 07:01

## 2020-01-15 RX ADMIN — LOSARTAN POTASSIUM 100 MG: 50 TABLET ORAL at 08:01

## 2020-01-15 RX ADMIN — AZITHROMYCIN MONOHYDRATE 250 MG: 250 TABLET ORAL at 12:01

## 2020-01-15 RX ADMIN — CARVEDILOL 25 MG: 12.5 TABLET, FILM COATED ORAL at 08:01

## 2020-01-15 RX ADMIN — PANTOPRAZOLE SODIUM 40 MG: 40 TABLET, DELAYED RELEASE ORAL at 08:01

## 2020-01-15 RX ADMIN — Medication 400 MG: at 08:01

## 2020-01-15 RX ADMIN — CLOPIDOGREL BISULFATE 75 MG: 75 TABLET ORAL at 08:01

## 2020-01-15 RX ADMIN — FUROSEMIDE 40 MG: 10 INJECTION, SOLUTION INTRAMUSCULAR; INTRAVENOUS at 08:01

## 2020-01-15 RX ADMIN — ASPIRIN 81 MG: 81 TABLET, COATED ORAL at 08:01

## 2020-01-15 NOTE — PROGRESS NOTES
"[facility] -   Cardiology  Progress Note    Patient Name: Kodi Ribeiro  MRN: [id number]  Admission Date: 1/13/2020  Hospital Length of Stay: 2 days  Code Status: Full Code   Attending Physician: Sara Garcia MD   Primary Care Physician: Norma Nuñez MD  Expected Discharge Date: 1/15/2020  Principal Problem:Acute diastolic congestive heart failure    Subjective:   HPI      Kodi Ribeiro is a 69 year old male who presents to [facility] due to sudden onset of shortness of breath. Per EMS " patient was sitting on the floor and short of breath with initial O2 sats around 76%, patient was given 6L O2 via NC with O2 sats up to the mid 80s." He was started on Bipap and given Duonebs and Steroids. His current medical conditions include HTN, HLP, Old MI, PAD s/p right BKA and left forefoot amputation, Carotid artery disease s/p  Left CEA, CKD Stage III-IV, CAD s/p PCI in 2000. Of note, patient is followed by Dr. Mancia and was last seen a few months ago and opted for medical management over PCI due to ongoing issues with CKD. He was deemed not a surgical candidate for CABG per Dr. Ibarra. He had PET stress which showed a small sized, mild intensity mid to apical anteroseptal stress induced perfusion abnormality in distribution of mid LAD and small sized abnormality in distribution of RCA.     ED workup revealed , Troponin negative x3, Mag 1.2, K+ 4.4, Cr 2.4, D Dimer 4.35, H/H 9.1/29.8. VQ scan revealed low probability for PE. CXR revealed a pattern of pulmonary vascular congestion and diffuse coarsening of interstitium. ECHO pending. Patient admitted to Telemetry under the care of [facility] medicine. Cardiology consulted to assist with medical management. Chart reviewed, patient seen and examined.     Denies chest pain or anginal equivalents on exam today. Feels much better on exam today. Reports noncompliance with dietary restrictions on a daily basis and eats excess salt. NO shortness of breath, " MACKEY or palpitations. Trace LLE edema today on exam. Denies orthopnea, PND or abdominal bloating today.     Hospital Course:   1/15/2020--Patient seen and examined in room, lying in bed. Feels good today. No chest pain or SOB issues today. Reinforced importance of dietary restrictions and compliance. Labs reviewed, K+4.5, Cr 2.4, BUN 37. OK to discharge home with cardiology follow up    Interval History: no acute issues noted o/n. OK to discharge home from cardiology standpoint.     Review of Systems   Constitution: Positive for malaise/fatigue.   HENT: Negative for hearing loss and hoarse voice.    Eyes: Negative for blurred vision and visual disturbance.   Cardiovascular: Positive for dyspnea on exertion and leg swelling. Negative for chest pain, claudication, irregular heartbeat, near-syncope, orthopnea, palpitations, paroxysmal nocturnal dyspnea and syncope.   Respiratory: Positive for shortness of breath. Negative for cough, hemoptysis, sleep disturbances due to breathing, snoring and wheezing.    Endocrine: Negative for cold intolerance and heat intolerance.   Hematologic/Lymphatic: Does not bruise/bleed easily.   Skin: Negative for color change, dry skin and nail changes.   Musculoskeletal: Positive for arthritis and back pain. Negative for joint pain and myalgias.   Gastrointestinal: Negative for bloating, abdominal pain, constipation, nausea and vomiting.   Genitourinary: Negative for dysuria, flank pain, hematuria and hesitancy.   Neurological: Positive for weakness. Negative for headaches, light-headedness, loss of balance, numbness and paresthesias.   Psychiatric/Behavioral: Negative for altered mental status.   Allergic/Immunologic: Negative for environmental allergies.     Objective:     Vital Signs (Most Recent):  Temp: 98.2 °F (36.8 °C) (01/15/20 1107)  Pulse: 62 (01/15/20 1107)  Resp: 18 (01/15/20 1107)  BP: 136/61 (01/15/20 1107)  SpO2: (!) 94 % (01/15/20 1342) Vital Signs (24h Range):  Temp:  [97.3  °F (36.3 °C)-98.2 °F (36.8 °C)] 98.2 °F (36.8 °C)  Pulse:  [62-73] 62  Resp:  [18-19] 18  SpO2:  [91 %-98 %] 94 %  BP: (131-148)/(60-68) 136/61     Weight: 76.3 kg (168 lb 3.4 oz)  Body mass index is 22.19 kg/m².     SpO2: (!) 94 %  O2 Device (Oxygen Therapy): room air      Intake/Output Summary (Last 24 hours) at 1/15/2020 1423  Last data filed at 1/15/2020 0431  Gross per 24 hour   Intake 600 ml   Output 2950 ml   Net -2350 ml       Lines/Drains/Airways     None                 Physical Exam   Constitutional: He is oriented to person, place, and time. He appears well-developed and well-nourished. No distress.   HENT:   Head: Normocephalic and atraumatic.   Eyes: Pupils are equal, round, and reactive to light.   Neck: Normal range of motion and full passive range of motion without pain. Neck supple. No JVD present. No tracheal deviation present. No thyromegaly present.   Cardiovascular: Normal rate, regular rhythm, S1 normal, S2 normal and intact distal pulses. PMI is not displaced. Exam reveals no distant heart sounds.   No murmur heard.  Pulses:       Radial pulses are 2+ on the right side, and 2+ on the left side.        Dorsalis pedis pulses are 2+ on the right side, and 2+ on the left side.   CHEST PAIN FREE   Pulmonary/Chest: Effort normal and breath sounds normal. No accessory muscle usage. No respiratory distress. He has no decreased breath sounds. He has no wheezes. He has no rales.   Abdominal: Soft. Bowel sounds are normal. He exhibits no distension. There is no tenderness.   Musculoskeletal: Normal range of motion. He exhibits no edema.        Left ankle: He exhibits swelling.   Neurological: He is alert and oriented to person, place, and time.   Skin: Skin is warm and dry. He is not diaphoretic. No cyanosis. Nails show no clubbing.   Psychiatric: He has a normal mood and affect. His speech is normal and behavior is normal. Judgment and thought content normal. Cognition and memory are normal.   Nursing  note and vitals reviewed.      Significant Labs:   BMP:   Recent Labs   Lab 01/13/20 2037 01/14/20  0729 01/15/20  0505   * 153* 131*    140 136   K 4.4 4.6 4.5   * 114* 108   CO2 13* 13* 16*   BUN 22 28* 37*   CREATININE 2.4* 2.4* 2.4*   CALCIUM 7.6* 8.5* 8.1*   MG  --  1.2* 1.4*   , CBC   Recent Labs   Lab 01/13/20 2037 01/14/20  0729 01/15/20  0506   WBC 8.94 4.67 7.00   HGB 9.1* 9.1* 7.7*   HCT 30.3* 29.8* 25.4*    204 214   , Troponin   Recent Labs   Lab 01/13/20 2037 01/14/20  0027 01/14/20  0729   TROPONINI <0.006 0.024 0.026    and All pertinent lab results from the last 24 hours have been reviewed.    Significant Imaging: Echocardiogram:   2D echo with color flow doppler:   Results for orders placed or performed during the hospital encounter of 01/13/20   2D echo with color flow doppler   Result Value Ref Range    QEF 55 55 - 65    Est. PA Systolic Pressure 16.32     Narrative    Date of Procedure: 01/14/2020        TEST DESCRIPTION   Technical Quality: This is a portable study performed at the patient's bedside. This is a technically challenging study. There is poor endocardial definition.     Aorta: The aortic root is normal in size, measuring 2.5 cm at sinotubular junction and 2.7 cm at Sinuses of Valsalva.     Left Atrium: The left atrial volume index is normal, measuring 34.42 cc/m2.     Left Ventricle: The left ventricle is normal in size, with an end-diastolic diameter of 4.7 cm, and an end-systolic diameter of 2.7 cm. LV wall thickness is normal, with the septum measuring 0.9 cm and the posterior wall measuring 1.1 cm across. Relative   wall thickness was increased at 0.47, and the LV mass index was 91.5 g/m2 consistent with concentric remodeling. There are no regional wall motion abnormalities. Left ventricular systolic function appears normal. Visually estimated ejection fraction is   55-60%. The LV Doppler derived stroke volume equals 67.0 ccs.     Diastolic indices:  E wave velocity 1.1 m/s, E/A ratio 1.5,  msec., E/e' ratio(avg) 10. Diastolic function is indeterminate.     Right Atrium: The right atrium is normal in size, measuring 4.8 cm in length and 4.2 cm in width in the apical view.     Right Ventricle: The right ventricle is normal in size. Global right ventricular systolic function appears normal. The estimated PA systolic pressure is greater than 16 mmHg.     Aortic Valve:  Aortic valve is normal in structure with normal leaflet mobility. The mean gradient obtained across the aortic valve is 3 mmHg.     Mitral Valve:  Mitral valve is normal in structure with normal leaflet mobility. The pressure half time is 95 msec. The calculated mitral valve area is 2.32 cm2.     Tricuspid Valve:  Tricuspid valve is normal in structure with normal leaflet mobility.     Pulmonary Valve:  Pulmonary valve is normal in structure with normal leaflet mobility.     IVC: The IVC is not visualized.     Atrial Septum: The atrial septum is intact.     Intracavitary: There is no evidence of pericardial effusion, intracavity mass, thrombi, or vegetation.         CONCLUSIONS     1 - Concentric remodeling.     2 - No wall motion abnormalities.     3 - Normal left ventricular systolic function (EF 55-60%).     4 - Indeterminate LV diastolic function.     5 - Normal right ventricular systolic function .     6 - The estimated PA systolic pressure is greater than 16 mmHg.             This document has been electronically    SIGNED BY: Kimberly Herring MD On: 01/14/2020 16:17    and X-Ray: CXR: X-Ray Chest 1 View (CXR): No results found for this visit on 01/13/20. and X-Ray Chest PA and Lateral (CXR): No results found for this visit on 01/13/20.    Assessment and Plan:       * Acute diastolic congestive heart failure  -Continue IV lasix  -Continue Coreg, Norvasc, ARB, Imdur  -Dash diet, 2 gm sodium restriction  -1500 ml fluid restriction  -Daily weights  -Strict intake and output  -Recommend dietary  consult to reinforce dietary restrictions  -Reinforced compliance with dietary restrictions   -ECHO pending  -Reassess in AM    1/15/2020  -Reinforced importance of compliance with sodium/fluid restrictions  -Continue current CV meds for now  -ECHO revealed EF 55-60%, -WMA's  -OK to discharge home with follow up with DR Mancia/Dr Boone    CAD;Old MI ; s/p stents x 3 (2000)   -continue current medical management  -OP Follow up with Dr Mancia    CKD (chronic kidney disease) stage 3, GFR 30-59 ml/min  -monitor renal function closely    Essential hypertension  -On medical therapy  -Continue Coreg, Norvasc, Imdur, ARB, Lasix        VTE Risk Mitigation (From admission, onward)         Ordered     IP VTE HIGH RISK PATIENT  Once      01/14/20 0019                Gris Fine, KEYONNA-C  Cardiology  [facility]

## 2020-01-15 NOTE — SUBJECTIVE & OBJECTIVE
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    Late complications of amputation stump     rseolved with further amputation( MRSA then none since 2014)    Lipoma of colon     colonoscopy 2/21/2014    Myocardial infarction     per patient 2000 & 9/2012    Peripheral vascular disease     Phantom limb syndrome     patient reports only intermittent not problematic, not worsening    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    Spinal cord disease     L4L5 disc    Stroke     Tobacco dependence     resolved    Ureteral stent retained        Past Surgical History:   Procedure Laterality Date    ABDOMINAL AORTIC ANEURYSM REPAIR      ABDOMINAL AORTIC ANEURYSM REPAIR  1996/2014    AMPUTATION, LOWER LIMB      AORTA - BILATERAL FEMORAL ARTERY BYPASS GRAFT  2014    Left and right leg    CORONARY ANGIOPLASTY WITH STENT PLACEMENT  2000    Three placed in heart    CYSTOSCOPY W/ RETROGRADES Left 5/29/2018    Procedure: CYSTOSCOPY, WITH RETROGRADE PYELOGRAM;  Surgeon: Scooter Jin IV, MD;  Location: [facility] OR;  Service: Urology;  Laterality: Left;    CYSTOSCOPY W/ URETERAL STENT PLACEMENT Left 5/29/2018    Procedure: CYSTOSCOPY, WITH URETERAL STENT INSERTION;  Surgeon: Scooter Jin IV, MD;  Location: [facility] OR;  Service: Urology;  Laterality: Left;    CYSTOSCOPY W/ URETERAL STENT REMOVAL Left 5/29/2018    Procedure: CYSTOSCOPY, WITH URETERAL STENT REMOVAL;  Surgeon: Scooter Jin IV, MD;  Location:  [facility] OR;  Service: Urology;  Laterality: Left;    FOOT AMPUTATION THROUGH METATARSAL      left    FOOT SURGERY Bilateral     per patient multiple toe amputations prior to.  partial foot amputation:first great toe then other toes     KIDNEY SURGERY      per patient separation of horseshoe kidney @ time of AAA repair    LEFT HEART CATHETERIZATION Left 3/7/2019    Procedure: CATHETERIZATION, HEART, LEFT;  Surgeon: Adriel Boone MD;  Location: [facility] CATH LAB;  Service: Cardiology;  Laterality: Left;  630 admit for IV hydration  10am start    LUNG LOBECTOMY Right     per patient not cancer    right below knee amputation   (approx)    SMALL INTESTINE SURGERY      per patient partial @ time of aaa repair  not small bowel - large bowel bowel compromised bythtwe AAAbowel    TONSILLECTOMY   aprox    URETERAL STENT PLACEMENT Left     annually replaced since  or so  Dr Jin       Review of patient's allergies indicates:   Allergen Reactions    Morphine Itching       Family History     Problem Relation (Age of Onset)    COPD Mother    Cancer Mother    Diabetes Daughter    Heart disease Father        Tobacco Use    Smoking status: Former Smoker     Packs/day: 1.00     Years: 15.00     Pack years: 15.00     Last attempt to quit: 2009     Years since quittin.0    Smokeless tobacco: Never Used   Substance and Sexual Activity    Alcohol use: No    Drug use: No     Comment: Is on prescription opiod, no non prescribed use    Sexual activity: Not Currently     Partners: Female         Review of Systems   Constitutional: Positive for diaphoresis and fatigue.   HENT: Negative.    Respiratory: Positive for cough and shortness of breath.    Cardiovascular: Positive for palpitations. Negative for chest pain.   Gastrointestinal: Positive for nausea.   Endocrine: Negative.    Genitourinary: Negative.    Musculoskeletal: Positive for arthralgias.   Allergic/Immunologic: Negative.     Neurological: Positive for weakness.   Hematological: Negative.    Psychiatric/Behavioral: Negative.      Objective:     Vital Signs (Most Recent):  Temp: 97.6 °F (36.4 °C) (01/14/20 1912)  Pulse: 69 (01/14/20 1912)  Resp: 18 (01/14/20 1912)  BP: 131/60 (01/14/20 1912)  SpO2: 97 % (01/14/20 1912) Vital Signs (24h Range):  Temp:  [97.3 °F (36.3 °C)-98.8 °F (37.1 °C)] 97.6 °F (36.4 °C)  Pulse:  [69-97] 69  Resp:  [17-46] 18  SpO2:  [95 %-100 %] 97 %  BP: (131-193)/(60-87) 131/60     Weight: 79 kg (174 lb 2.6 oz)  Body mass index is 22.98 kg/m².      Intake/Output Summary (Last 24 hours) at 1/14/2020 1953  Last data filed at 1/14/2020 1700  Gross per 24 hour   Intake 360 ml   Output 1575 ml   Net -1215 ml       Physical Exam    Vents:  Oxygen Concentration (%): 32 (01/14/20 0737)    Lines/Drains/Airways     Peripheral Intravenous Line                 Peripheral IV - Single Lumen 20 G Left Antecubital -- days                Significant Labs:    CBC/Anemia Profile:  Recent Labs   Lab 01/13/20 2037 01/14/20  0729   WBC 8.94 4.67   HGB 9.1* 9.1*   HCT 30.3* 29.8*    204   MCV 92 91   RDW 15.2* 15.2*        Chemistries:  Recent Labs   Lab 01/13/20 2037 01/14/20  0729    140   K 4.4 4.6   * 114*   CO2 13* 13*   BUN 22 28*   CREATININE 2.4* 2.4*   CALCIUM 7.6* 8.5*   ALBUMIN 3.7 3.8   PROT 7.9 8.2   BILITOT 0.5 0.5   ALKPHOS 187* 184*   ALT 8* 7*   AST 10 9*   MG  --  1.2*       BMP:   Recent Labs   Lab 01/14/20  0729   *      K 4.6   *   CO2 13*   BUN 28*   CREATININE 2.4*   CALCIUM 8.5*   MG 1.2*     CMP:   Recent Labs   Lab 01/13/20 2037 01/14/20  0729    140   K 4.4 4.6   * 114*   CO2 13* 13*   * 153*   BUN 22 28*   CREATININE 2.4* 2.4*   CALCIUM 7.6* 8.5*   PROT 7.9 8.2   ALBUMIN 3.7 3.8   BILITOT 0.5 0.5   ALKPHOS 187* 184*   AST 10 9*   ALT 8* 7*   ANIONGAP 13 13   EGFRNONAA 26* 26*   Results for FELIPE MERIDA (MRN [id number]) as of 1/14/2020 19:30   Ref.  Range 1/13/2020 20:39   POC PH Latest Ref Range: 7.35 - 7.45  7.241 (LL)   POC PCO2 Latest Ref Range: 35 - 45 mmHg 35.8   POC PO2 Latest Ref Range: 80 - 100 mmHg 69 (L)   POC BE Latest Ref Range: -2 to 2 mmol/L -12   POC HCO3 Latest Ref Range: 24 - 28 mmol/L 15.4 (L)   POC SATURATED O2 Latest Ref Range: 95 - 100 % 90 (L)   FiO2 Unknown 50   Sample Unknown ARTERIAL   DelSys Unknown CPAP/BiPAP   Allens Test Unknown Pass   Site Unknown RR   Mode Unknown BiPAP   Rate Unknown 16     All pertinent labs within the past 24 hours have been reviewed.    Significant Imaging:   I have reviewed all pertinent imaging results/findings within the past 24 hours.  I have reviewed and interpreted all pertinent imaging results/findings within the past 24 hours.     2D echo with color flow doppler  Date of Procedure: 01/14/2020    TEST DESCRIPTION   Technical Quality: This is a portable study performed at the patient's bedside. This is a technically challenging study. There is poor endocardial definition.     Aorta: The aortic root is normal in size, measuring 2.5 cm at sinotubular junction and 2.7 cm at Sinuses of Valsalva.     Left Atrium: The left atrial volume index is normal, measuring 34.42 cc/m2.     Left Ventricle: The left ventricle is normal in size, with an end-diastolic diameter of 4.7 cm, and an end-systolic diameter of 2.7 cm. LV wall thickness is normal, with the septum measuring 0.9 cm and the posterior wall measuring 1.1 cm across. Relative   wall thickness was increased at 0.47, and the LV mass index was 91.5 g/m2 consistent with concentric remodeling. There are no regional wall motion abnormalities. Left ventricular systolic function appears normal. Visually estimated ejection fraction is   55-60%. The LV Doppler derived stroke volume equals 67.0 ccs.     Diastolic indices: E wave velocity 1.1 m/s, E/A ratio 1.5,  msec., E/e' ratio(avg) 10. Diastolic function is indeterminate.     Right Atrium: The right atrium  is normal in size, measuring 4.8 cm in length and 4.2 cm in width in the apical view.     Right Ventricle: The right ventricle is normal in size. Global right ventricular systolic function appears normal. The estimated PA systolic pressure is greater than 16 mmHg.     Aortic Valve:  Aortic valve is normal in structure with normal leaflet mobility. The mean gradient obtained across the aortic valve is 3 mmHg.     Mitral Valve:  Mitral valve is normal in structure with normal leaflet mobility. The pressure half time is 95 msec. The calculated mitral valve area is 2.32 cm2.     Tricuspid Valve:  Tricuspid valve is normal in structure with normal leaflet mobility.     Pulmonary Valve:  Pulmonary valve is normal in structure with normal leaflet mobility.     IVC: The IVC is not visualized.     Atrial Septum: The atrial septum is intact.     Intracavitary: There is no evidence of pericardial effusion, intracavity mass, thrombi, or vegetation.     CONCLUSIONS     1 - Concentric remodeling.     2 - No wall motion abnormalities.     3 - Normal left ventricular systolic function (EF 55-60%).     4 - Indeterminate LV diastolic function.     5 - Normal right ventricular systolic function .     6 - The estimated PA systolic pressure is greater than 16 mmHg.     This document has been electronically    SIGNED BY: Kimberly Herring MD On: 01/14/2020 16:17  NM Lung Scan Ventilation Perfusion  Narrative: EXAMINATION:  NM LUNG VENTILATION AND PERFUSION IMAGING    CLINICAL HISTORY:  Dyspnea, cardiac origin suspected;    TECHNIQUE:  1 mCi of Tc-99m-DTPA were placed in the nebulizer. Following the inhalation Tc-99m-DTPA in aerosol and the subsequent IV administration of 6 mCi of Tc-99m-MAA, multiple images of the thorax were obtained in various projections.    COMPARISON:  01/13/2019    FINDINGS:  On the perfusion study, no perfusion abnormalities.  The ventilation study reveals no ventilation abnormalities.  The CXR demonstrates pattern of  possible interstitial edema or interstitial pneumonia.  Impression: This represents a low probability  of pulmonary embolism.    Electronically signed by: Aurelio Curtis MD  Date:    01/14/2020  Time:    08:46

## 2020-01-15 NOTE — ASSESSMENT & PLAN NOTE
-Continue IV lasix  -Continue Coreg, Norvasc, ARB, Imdur  -Dash diet, 2 gm sodium restriction  -1500 ml fluid restriction  -Daily weights  -Strict intake and output  -Recommend dietary consult to reinforce dietary restrictions  -Reinforced compliance with dietary restrictions   -ECHO pending  -Reassess in AM    1/15/2020  -Reinforced importance of compliance with sodium/fluid restrictions  -Continue current CV meds for now  -ECHO revealed EF 55-60%, -WMA's  -OK to discharge home with follow up with DR Mancia/Dr Boone

## 2020-01-15 NOTE — CONSULTS
[facility] -   Pulmonology  Consult Note    Patient Name: Kodi Ribeiro  MRN: [id number]  Admission Date: 1/13/2020  Hospital Length of Stay: 1 days  Code Status: Full Code  Attending Physician: Stefan Leary MD  Primary Care Provider: Norma Nuñez MD   Principal Problem: Acute diastolic congestive heart failure      Subjective: feel a little better       HPI:  68 y/o with 2 week history of progressive shortness of breath, worse one day prior to admission on 1/13/2020. C/o paroxysmal nocturnal dyspnea, orthopnea and nonproductive cough. Former smoker. Complicated past history  of stroke, spinal cord disease, peripheral vascular disease, MI (per patient, 2000 & 2012), lipoma of colon, infection of aortic graft, HTN, HLD, hyperglycemia, horseshoe kidney, hemorrhoids, GERD, diverticulosis, CAD, colon polyp, arthritis, angina pectoris, anemia, and analgesic nephropathy. Denies significant past history of asthma or Chronic Obstructive Pulmonary Disease. Largely nonproductive cough. Denies high fever or chills      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    Late complications of amputation stump     rseolved with further amputation( MRSA then none since 2014)    Lipoma of colon     colonoscopy 2/21/2014    Myocardial infarction     per patient 2000 & 9/2012    Peripheral vascular disease     Phantom limb syndrome     patient reports only intermittent not problematic, not worsening     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    Spinal cord disease     L4L5 disc    Stroke     Tobacco dependence     resolved    Ureteral stent retained        Past Surgical History:   Procedure Laterality Date    ABDOMINAL AORTIC ANEURYSM REPAIR      ABDOMINAL AORTIC ANEURYSM REPAIR  1996/2014    AMPUTATION, LOWER LIMB      AORTA - BILATERAL FEMORAL ARTERY BYPASS GRAFT  2014    Left and right leg    CORONARY ANGIOPLASTY WITH STENT PLACEMENT  2000    Three placed in heart    CYSTOSCOPY W/ RETROGRADES Left 5/29/2018    Procedure: CYSTOSCOPY, WITH RETROGRADE PYELOGRAM;  Surgeon: Scooter Jin IV, MD;  Location: [facility] OR;  Service: Urology;  Laterality: Left;    CYSTOSCOPY W/ URETERAL STENT PLACEMENT Left 5/29/2018    Procedure: CYSTOSCOPY, WITH URETERAL STENT INSERTION;  Surgeon: Scooter Jin IV, MD;  Location: [facility] OR;  Service: Urology;  Laterality: Left;    CYSTOSCOPY W/ URETERAL STENT REMOVAL Left 5/29/2018    Procedure: CYSTOSCOPY, WITH URETERAL STENT REMOVAL;  Surgeon: Scooter Jin IV, MD;  Location: [facility] OR;  Service: Urology;  Laterality: Left;    FOOT AMPUTATION THROUGH METATARSAL  1996    left    FOOT SURGERY Bilateral 1980's    per patient multiple toe amputations prior to.  partial foot amputation:first great toe then other toes     KIDNEY SURGERY  2014    per patient separation of horseshoe kidney @ time of AAA repair    LEFT HEART CATHETERIZATION Left 3/7/2019    Procedure: CATHETERIZATION, HEART, LEFT;  Surgeon: Adriel Boone MD;  Location: [facility] CATH LAB;  Service: Cardiology;  Laterality: Left;  630 admit for IV hydration  10am start    LUNG LOBECTOMY Right 1970s    per patient not cancer    right below knee amputation  2009 (approx)    SMALL INTESTINE SURGERY  2014    per patient partial @ time of aaa repair  not small bowel - large bowel bowel compromised bynghia AAAbowel    TONSILLECTOMY  1955 aprox    URETERAL STENT PLACEMENT Left     annually replaced since   or so  Dr Jin       Review of patient's allergies indicates:   Allergen Reactions    Morphine Itching       Family History     Problem Relation (Age of Onset)    COPD Mother    Cancer Mother    Diabetes Daughter    Heart disease Father        Tobacco Use    Smoking status: Former Smoker     Packs/day: 1.00     Years: 15.00     Pack years: 15.00     Last attempt to quit: 2009     Years since quittin.0    Smokeless tobacco: Never Used   Substance and Sexual Activity    Alcohol use: No    Drug use: No     Comment: Is on prescription opiod, no non prescribed use    Sexual activity: Not Currently     Partners: Female         Review of Systems   Constitutional: Positive for diaphoresis and fatigue.   HENT: Negative.    Respiratory: Positive for cough and shortness of breath.    Cardiovascular: Positive for palpitations. Negative for chest pain.   Gastrointestinal: Positive for nausea.   Endocrine: Negative.    Genitourinary: Negative.    Musculoskeletal: Positive for arthralgias.   Allergic/Immunologic: Negative.    Neurological: Positive for weakness.   Hematological: Negative.    Psychiatric/Behavioral: Negative.      Objective:     Vital Signs (Most Recent):  Temp: 97.6 °F (36.4 °C) (20)  Pulse: 69 (20)  Resp: 18 (20)  BP: 131/60 (20)  SpO2: 97 % (20) Vital Signs (24h Range):  Temp:  [97.3 °F (36.3 °C)-98.8 °F (37.1 °C)] 97.6 °F (36.4 °C)  Pulse:  [69-97] 69  Resp:  [17-46] 18  SpO2:  [95 %-100 %] 97 %  BP: (131-193)/(60-87) 131/60     Weight: 79 kg (174 lb 2.6 oz)  Body mass index is 22.98 kg/m².      Intake/Output Summary (Last 24 hours) at 2020  Last data filed at 2020 1700  Gross per 24 hour   Intake 360 ml   Output 1575 ml   Net -1215 ml       Physical Exam    Vents:  Oxygen Concentration (%): 32 (20 0737)    Lines/Drains/Airways     Peripheral Intravenous Line                 Peripheral IV - Single Lumen 20 G Left  Antecubital -- days                Significant Labs:    CBC/Anemia Profile:  Recent Labs   Lab 01/13/20 2037 01/14/20  0729   WBC 8.94 4.67   HGB 9.1* 9.1*   HCT 30.3* 29.8*    204   MCV 92 91   RDW 15.2* 15.2*        Chemistries:  Recent Labs   Lab 01/13/20 2037 01/14/20  0729    140   K 4.4 4.6   * 114*   CO2 13* 13*   BUN 22 28*   CREATININE 2.4* 2.4*   CALCIUM 7.6* 8.5*   ALBUMIN 3.7 3.8   PROT 7.9 8.2   BILITOT 0.5 0.5   ALKPHOS 187* 184*   ALT 8* 7*   AST 10 9*   MG  --  1.2*       BMP:   Recent Labs   Lab 01/14/20  0729   *      K 4.6   *   CO2 13*   BUN 28*   CREATININE 2.4*   CALCIUM 8.5*   MG 1.2*     CMP:   Recent Labs   Lab 01/13/20 2037 01/14/20  0729    140   K 4.4 4.6   * 114*   CO2 13* 13*   * 153*   BUN 22 28*   CREATININE 2.4* 2.4*   CALCIUM 7.6* 8.5*   PROT 7.9 8.2   ALBUMIN 3.7 3.8   BILITOT 0.5 0.5   ALKPHOS 187* 184*   AST 10 9*   ALT 8* 7*   ANIONGAP 13 13   EGFRNONAA 26* 26*   Results for FELIPE MERIDA (MRN [id number]) as of 1/14/2020 19:30   Ref. Range 1/13/2020 20:39   POC PH Latest Ref Range: 7.35 - 7.45  7.241 (LL)   POC PCO2 Latest Ref Range: 35 - 45 mmHg 35.8   POC PO2 Latest Ref Range: 80 - 100 mmHg 69 (L)   POC BE Latest Ref Range: -2 to 2 mmol/L -12   POC HCO3 Latest Ref Range: 24 - 28 mmol/L 15.4 (L)   POC SATURATED O2 Latest Ref Range: 95 - 100 % 90 (L)   FiO2 Unknown 50   Sample Unknown ARTERIAL   DelSys Unknown CPAP/BiPAP   Allens Test Unknown Pass   Site Unknown RR   Mode Unknown BiPAP   Rate Unknown 16     All pertinent labs within the past 24 hours have been reviewed.    Significant Imaging:   I have reviewed all pertinent imaging results/findings within the past 24 hours.  I have reviewed and interpreted all pertinent imaging results/findings within the past 24 hours.     2D echo with color flow doppler  Date of Procedure: 01/14/2020    TEST DESCRIPTION   Technical Quality: This is a portable study performed at the  patient's bedside. This is a technically challenging study. There is poor endocardial definition.     Aorta: The aortic root is normal in size, measuring 2.5 cm at sinotubular junction and 2.7 cm at Sinuses of Valsalva.     Left Atrium: The left atrial volume index is normal, measuring 34.42 cc/m2.     Left Ventricle: The left ventricle is normal in size, with an end-diastolic diameter of 4.7 cm, and an end-systolic diameter of 2.7 cm. LV wall thickness is normal, with the septum measuring 0.9 cm and the posterior wall measuring 1.1 cm across. Relative   wall thickness was increased at 0.47, and the LV mass index was 91.5 g/m2 consistent with concentric remodeling. There are no regional wall motion abnormalities. Left ventricular systolic function appears normal. Visually estimated ejection fraction is   55-60%. The LV Doppler derived stroke volume equals 67.0 ccs.     Diastolic indices: E wave velocity 1.1 m/s, E/A ratio 1.5,  msec., E/e' ratio(avg) 10. Diastolic function is indeterminate.     Right Atrium: The right atrium is normal in size, measuring 4.8 cm in length and 4.2 cm in width in the apical view.     Right Ventricle: The right ventricle is normal in size. Global right ventricular systolic function appears normal. The estimated PA systolic pressure is greater than 16 mmHg.     Aortic Valve:  Aortic valve is normal in structure with normal leaflet mobility. The mean gradient obtained across the aortic valve is 3 mmHg.     Mitral Valve:  Mitral valve is normal in structure with normal leaflet mobility. The pressure half time is 95 msec. The calculated mitral valve area is 2.32 cm2.     Tricuspid Valve:  Tricuspid valve is normal in structure with normal leaflet mobility.     Pulmonary Valve:  Pulmonary valve is normal in structure with normal leaflet mobility.     IVC: The IVC is not visualized.     Atrial Septum: The atrial septum is intact.     Intracavitary: There is no evidence of pericardial  effusion, intracavity mass, thrombi, or vegetation.     CONCLUSIONS     1 - Concentric remodeling.     2 - No wall motion abnormalities.     3 - Normal left ventricular systolic function (EF 55-60%).     4 - Indeterminate LV diastolic function.     5 - Normal right ventricular systolic function .     6 - The estimated PA systolic pressure is greater than 16 mmHg.     This document has been electronically    SIGNED BY: Kimberly Herring MD On: 01/14/2020 16:17  NM Lung Scan Ventilation Perfusion  Narrative: EXAMINATION:  NM LUNG VENTILATION AND PERFUSION IMAGING    CLINICAL HISTORY:  Dyspnea, cardiac origin suspected;    TECHNIQUE:  1 mCi of Tc-99m-DTPA were placed in the nebulizer. Following the inhalation Tc-99m-DTPA in aerosol and the subsequent IV administration of 6 mCi of Tc-99m-MAA, multiple images of the thorax were obtained in various projections.    COMPARISON:  01/13/2019    FINDINGS:  On the perfusion study, no perfusion abnormalities.  The ventilation study reveals no ventilation abnormalities.  The CXR demonstrates pattern of possible interstitial edema or interstitial pneumonia.  Impression: This represents a low probability  of pulmonary embolism.    Electronically signed by: Aurelio Curtis MD  Date:    01/14/2020  Time:    08:46          ABG  Recent Labs   Lab 01/13/20 2039   PH 7.241*   PO2 69*   PCO2 35.8   HCO3 15.4*   BE -12     Assessment/Plan:     * Acute diastolic congestive heart failure  1/14 b blockers, diuretics    Acute respiratory failure with hypoxia  1/14 continue oxygen, jet nebs every 6 , monitor    SOB (shortness of breath)  1/14 continue diuresis    Hypoxemia  1/14 low flow oxygen          Thank you for your consult. I will follow-up with patient. Please contact us if you have any additional questions.     Onesimo Mike MD  Pulmonology  [facility] -

## 2020-01-15 NOTE — HPI
68 y/o with 2 week history of progressive shortness of breath, worse one day prior to admission on 1/13/2020. C/o paroxysmal nocturnal dyspnea, orthopnea and nonproductive cough. Former smoker. Complicated past history  of stroke, spinal cord disease, peripheral vascular disease, MI (per patient, 2000 & 2012), lipoma of colon, infection of aortic graft, HTN, HLD, hyperglycemia, horseshoe kidney, hemorrhoids, GERD, diverticulosis, CAD, colon polyp, arthritis, angina pectoris, anemia, and analgesic nephropathy. Denies significant past history of asthma or Chronic Obstructive Pulmonary Disease. Largely nonproductive cough. Denies high fever or chills

## 2020-01-15 NOTE — HOSPITAL COURSE
1/15/2020--Patient seen and examined in room, lying in bed. Feels good today. No chest pain or SOB issues today. Reinforced importance of dietary restrictions and compliance. Labs reviewed, K+4.5, Cr 2.4, BUN 37. OK to discharge home with cardiology follow up

## 2020-01-15 NOTE — PLAN OF CARE
Patient AAOx4. VSS..   Patient remained afebrile throughout the shift.  Heart rate closely monitored   Patient SR on monitor.  Pulmonology consult pending  Patient remained free of falls this shift.  Plan of care reviewed.  Patient verbalized understanding.  Patient moving/turning independently  Frequent weight shifting encouraged.  Bed low, siderails up x2, wheels locked, call light in reach.  Patient instructed to call for assistance.  Hourly rounding completed.  Will continue to monitor.

## 2020-01-15 NOTE — NURSING
Went over discharge instructions with patient.   Stressed importance of making and keeping all follow ups as well as making prescribed medication changes.   Prescriptions sent to pt's requested pharmacy.  Patient verbalized understanding and has had all questions in regards to discharge answered to satisfaction.  IV removed without complications.  Telemetry box removed and returned to monitor tech.  Patient instructed to call nurse station once dressed and ready to be discharged via wheelchair to personal transportation.  Primary nurse notified of pt's discharge status.

## 2020-01-15 NOTE — SUBJECTIVE & OBJECTIVE
Interval History: no acute issues noted o/n. OK to discharge home from cardiology standpoint.     Review of Systems   Constitution: Positive for malaise/fatigue.   HENT: Negative for hearing loss and hoarse voice.    Eyes: Negative for blurred vision and visual disturbance.   Cardiovascular: Positive for dyspnea on exertion and leg swelling. Negative for chest pain, claudication, irregular heartbeat, near-syncope, orthopnea, palpitations, paroxysmal nocturnal dyspnea and syncope.   Respiratory: Positive for shortness of breath. Negative for cough, hemoptysis, sleep disturbances due to breathing, snoring and wheezing.    Endocrine: Negative for cold intolerance and heat intolerance.   Hematologic/Lymphatic: Does not bruise/bleed easily.   Skin: Negative for color change, dry skin and nail changes.   Musculoskeletal: Positive for arthritis and back pain. Negative for joint pain and myalgias.   Gastrointestinal: Negative for bloating, abdominal pain, constipation, nausea and vomiting.   Genitourinary: Negative for dysuria, flank pain, hematuria and hesitancy.   Neurological: Positive for weakness. Negative for headaches, light-headedness, loss of balance, numbness and paresthesias.   Psychiatric/Behavioral: Negative for altered mental status.   Allergic/Immunologic: Negative for environmental allergies.     Objective:     Vital Signs (Most Recent):  Temp: 98.2 °F (36.8 °C) (01/15/20 1107)  Pulse: 62 (01/15/20 1107)  Resp: 18 (01/15/20 1107)  BP: 136/61 (01/15/20 1107)  SpO2: (!) 94 % (01/15/20 1342) Vital Signs (24h Range):  Temp:  [97.3 °F (36.3 °C)-98.2 °F (36.8 °C)] 98.2 °F (36.8 °C)  Pulse:  [62-73] 62  Resp:  [18-19] 18  SpO2:  [91 %-98 %] 94 %  BP: (131-148)/(60-68) 136/61     Weight: 76.3 kg (168 lb 3.4 oz)  Body mass index is 22.19 kg/m².     SpO2: (!) 94 %  O2 Device (Oxygen Therapy): room air      Intake/Output Summary (Last 24 hours) at 1/15/2020 1423  Last data filed at 1/15/2020 0431  Gross per 24 hour    Intake 600 ml   Output 2950 ml   Net -2350 ml       Lines/Drains/Airways     None                 Physical Exam   Constitutional: He is oriented to person, place, and time. He appears well-developed and well-nourished. No distress.   HENT:   Head: Normocephalic and atraumatic.   Eyes: Pupils are equal, round, and reactive to light.   Neck: Normal range of motion and full passive range of motion without pain. Neck supple. No JVD present. No tracheal deviation present. No thyromegaly present.   Cardiovascular: Normal rate, regular rhythm, S1 normal, S2 normal and intact distal pulses. PMI is not displaced. Exam reveals no distant heart sounds.   No murmur heard.  Pulses:       Radial pulses are 2+ on the right side, and 2+ on the left side.        Dorsalis pedis pulses are 2+ on the right side, and 2+ on the left side.   CHEST PAIN FREE   Pulmonary/Chest: Effort normal and breath sounds normal. No accessory muscle usage. No respiratory distress. He has no decreased breath sounds. He has no wheezes. He has no rales.   Abdominal: Soft. Bowel sounds are normal. He exhibits no distension. There is no tenderness.   Musculoskeletal: Normal range of motion. He exhibits no edema.        Left ankle: He exhibits swelling.   Neurological: He is alert and oriented to person, place, and time.   Skin: Skin is warm and dry. He is not diaphoretic. No cyanosis. Nails show no clubbing.   Psychiatric: He has a normal mood and affect. His speech is normal and behavior is normal. Judgment and thought content normal. Cognition and memory are normal.   Nursing note and vitals reviewed.      Significant Labs:   BMP:   Recent Labs   Lab 01/13/20 2037 01/14/20  0729 01/15/20  0505   * 153* 131*    140 136   K 4.4 4.6 4.5   * 114* 108   CO2 13* 13* 16*   BUN 22 28* 37*   CREATININE 2.4* 2.4* 2.4*   CALCIUM 7.6* 8.5* 8.1*   MG  --  1.2* 1.4*   , CBC   Recent Labs   Lab 01/13/20 2037 01/14/20  0729 01/15/20  0506   WBC 8.94  4.67 7.00   HGB 9.1* 9.1* 7.7*   HCT 30.3* 29.8* 25.4*    204 214   , Troponin   Recent Labs   Lab 01/13/20  2037 01/14/20  0027 01/14/20  0729   TROPONINI <0.006 0.024 0.026    and All pertinent lab results from the last 24 hours have been reviewed.    Significant Imaging: Echocardiogram:   2D echo with color flow doppler:   Results for orders placed or performed during the hospital encounter of 01/13/20   2D echo with color flow doppler   Result Value Ref Range    QEF 55 55 - 65    Est. PA Systolic Pressure 16.32     Narrative    Date of Procedure: 01/14/2020        TEST DESCRIPTION   Technical Quality: This is a portable study performed at the patient's bedside. This is a technically challenging study. There is poor endocardial definition.     Aorta: The aortic root is normal in size, measuring 2.5 cm at sinotubular junction and 2.7 cm at Sinuses of Valsalva.     Left Atrium: The left atrial volume index is normal, measuring 34.42 cc/m2.     Left Ventricle: The left ventricle is normal in size, with an end-diastolic diameter of 4.7 cm, and an end-systolic diameter of 2.7 cm. LV wall thickness is normal, with the septum measuring 0.9 cm and the posterior wall measuring 1.1 cm across. Relative   wall thickness was increased at 0.47, and the LV mass index was 91.5 g/m2 consistent with concentric remodeling. There are no regional wall motion abnormalities. Left ventricular systolic function appears normal. Visually estimated ejection fraction is   55-60%. The LV Doppler derived stroke volume equals 67.0 ccs.     Diastolic indices: E wave velocity 1.1 m/s, E/A ratio 1.5,  msec., E/e' ratio(avg) 10. Diastolic function is indeterminate.     Right Atrium: The right atrium is normal in size, measuring 4.8 cm in length and 4.2 cm in width in the apical view.     Right Ventricle: The right ventricle is normal in size. Global right ventricular systolic function appears normal. The estimated PA systolic pressure  is greater than 16 mmHg.     Aortic Valve:  Aortic valve is normal in structure with normal leaflet mobility. The mean gradient obtained across the aortic valve is 3 mmHg.     Mitral Valve:  Mitral valve is normal in structure with normal leaflet mobility. The pressure half time is 95 msec. The calculated mitral valve area is 2.32 cm2.     Tricuspid Valve:  Tricuspid valve is normal in structure with normal leaflet mobility.     Pulmonary Valve:  Pulmonary valve is normal in structure with normal leaflet mobility.     IVC: The IVC is not visualized.     Atrial Septum: The atrial septum is intact.     Intracavitary: There is no evidence of pericardial effusion, intracavity mass, thrombi, or vegetation.         CONCLUSIONS     1 - Concentric remodeling.     2 - No wall motion abnormalities.     3 - Normal left ventricular systolic function (EF 55-60%).     4 - Indeterminate LV diastolic function.     5 - Normal right ventricular systolic function .     6 - The estimated PA systolic pressure is greater than 16 mmHg.             This document has been electronically    SIGNED BY: Kimberly Herring MD On: 01/14/2020 16:17    and X-Ray: CXR: X-Ray Chest 1 View (CXR): No results found for this visit on 01/13/20. and X-Ray Chest PA and Lateral (CXR): No results found for this visit on 01/13/20.

## 2020-01-16 RX ORDER — FUROSEMIDE 20 MG/1
TABLET ORAL
Qty: 90 TABLET | OUTPATIENT
Start: 2020-01-16

## 2020-01-16 NOTE — DISCHARGE SUMMARY
"[facility] Hospital Medicine  Discharge Summary      Patient Name: Kodi Ribeiro  MRN: [id number]  Admission Date: 1/13/2020  Hospital Length of Stay: 2 days  Discharge Date and Time: 1/15/2020  2:20 PM  Attending Physician: Sara Garcia MD  Discharging Provider: Matilda Soriano NP  Primary Care Provider: Norma Nuñez MD      HPI:    69 year old man with extensive past medical and surgery history of    stroke, spinal cord disease, peripheral vascular disease, MI (per patient, 2000 & 2012), lipoma of colon, infection of aortic graft, HTN, HLD, hyperglycemia, horseshoe kidney, hemorrhoids, GERD,  Who presented today with sudden onset of worsening shortness of breath . He went to work this morning and was preparing to watch the [employer] game with the wife when he called the wife to call 911 as he could not breath. Per  EMS: "Pt was sitting on the floor and short of breath, with initial O2 sats around 76. Pt was given 6L O2 via nasal cannula, with O2 sats going up to the mid 80s.He was started on bipap and was given duonebs and solumedrol . He denies any history of long distance travel , leg swelling or chest pain .  Previous documentation showed that [facility] -03/2019-Three vessel coronary artery disease.   2.   Normal LVEF.   3.   Diastolic dysfunction.  Lab review showed BNP-965, d dimer is pending .  Chest x-ray showed pattern of   pulmonary vascular congestion and diffuse coarsening of the interstitium.   ABG showed-PH-7.24,po2-69    * No surgery found *      Hospital Course:   Pt experienced Acute hypoxic respiratory failure at home and SOB. Initially placed on BiPAP, given lasix then oxygen weaned to nasal cannula. Pt has a documented diastolic heart failure and CXR indicates pulmonary vascular congestion and diuresis with IV lasix initiated. Repeat 2 D ECHO pending and Cardiology consult pending. According to patient he has CAD which could not be intervened. He is [place] Cardiology [place]. Serial troponin normal. 2 D ECHO confirmed no wall motion abnormalities, preserved LVSF and indeterminate Lv diastolic function. Cardiology agreed with lasix diuresis. Also, a carotid US was performed with the following results: 20-50% stenosis on the right, 0-20% stenosis on the left. Also, Pulmonology saw the patient and diagnosed presumed PNA and Azithromycin was started. The next day, pt's SOB was much improved. He was cleared for discharge by Cardiology. He was prescribed lasix 20 mg daily and Azithromycin. Pt was advised to follow up with PCP and Cardiology. He was seen and examined and determined to be safe and stable for discharge.      Consults:   Consults (From admission, onward)        Status Ordering Provider     Inpatient consult to Cardiology  Once     Provider:  Kimberly Herring MD    Completed KEITH LANCE consult to case management  Once     Provider:  (Not yet assigned)    BRUCE Fall          CAD;Old MI ; s/p stents x 3 (2000)     Will continue aspirin/plavix , imdur.    CKD (chronic kidney disease) stage 3, GFR 30-59 ml/min    Will avoid nephrotoxic meds , monitor closely     Essential hypertension    Will continue losartan, hydralazine prn      Final Active Diagnoses:    Diagnosis Date Noted POA    PRINCIPAL PROBLEM:  Acute diastolic congestive heart failure [I50.31] 01/13/2020 Yes    Renal dysfunction [N28.9] 01/13/2020 Yes    CAD;Old MI ; s/p stents x 3 (2000)  [I25.2] 06/22/2015 Not Applicable     Chronic    CKD (chronic kidney disease) stage 3, GFR 30-59 ml/min [N18.3] 09/04/2014 Yes     Chronic    Essential hypertension [I10] 06/18/2013 Yes     Chronic      Problems Resolved During this Admission:    Diagnosis Date Noted Date Resolved POA    Alteration in skin integrity [R23.9] 01/14/2020 01/15/2020 Yes    Acute respiratory failure with hypoxia [J96.01] 01/14/2020 01/15/2020 Yes    Hypoxemia [R09.02] 01/13/2020 01/15/2020 Yes     SOB (shortness of breath) [R06.02] 01/13/2020 01/15/2020 Yes       Discharged Condition: stable    Disposition: Home or Self Care    Follow Up:  Follow-up Information     Norma Nuñez MD In 3 days.    Specialty:  Family Medicine  Why:  Hospital Follow Up - Decompensated Diastolic HF  Contact information:  [address] [address] [address]  [phone]             Brendan Mancia MD In 2 weeks.    Specialty:  INTERVENTIONAL CARDIOLOGY  Why:  Hospital Follow Up - Heart Failure and Coronary Artery Disease  Contact information:  [address]  [facility] [address]  [phone]                 Patient Instructions:      Notify your health care provider if you experience any of the following:  temperature >100.4     Notify your health care provider if you experience any of the following:  difficulty breathing or increased cough     Activity as tolerated       Significant Diagnostic Studies: Labs:   CMP   Recent Labs   Lab 01/15/20  0505      K 4.5      CO2 16*   *   BUN 37*   CREATININE 2.4*   CALCIUM 8.1*   PROT 7.1   ALBUMIN 3.5   BILITOT 0.5   ALKPHOS 147*   AST 9*   ALT 6*   ANIONGAP 12   ESTGFRAFRICA 30*   EGFRNONAA 26*    and CBC   Recent Labs   Lab 01/15/20  0506   WBC 7.00   HGB 7.7*   HCT 25.4*          Pending Diagnostic Studies:     None         Medications:  Reconciled Home Medications:      Medication List      START taking these medications    azithromycin 500 MG tablet  Commonly known as:  ZITHROMAX  Take 1 tablet (500 mg total) by mouth once daily. for 7 days     furosemide 20 MG tablet  Commonly known as:  LASIX  Take 1 tablet (20 mg total) by mouth once daily.     magnesium oxide 400 mg (241.3 mg magnesium) tablet  Commonly known as:  MAG-OX  Take 1 tablet (400 mg total) by mouth 2 (two) times daily. for 2 days        CONTINUE taking these medications    amLODIPine 10 MG tablet  Commonly known as:  NORVASC  TAKE 1 TABLET EVERY DAY     aspirin 81 MG EC  tablet  Commonly known as:  ECOTRIN  Take 1 tablet (81 mg total) by mouth once daily.     carvedilol 25 MG tablet  Commonly known as:  COREG  Take 1 tablet (25 mg total) by mouth 2 (two) times daily with meals.     cetirizine 10 MG tablet  Commonly known as:  ZYRTEC  Take 10 mg by mouth once daily.     clopidogrel 75 mg tablet  Commonly known as:  PLAVIX  TAKE 1 TABLET EVERY DAY     isosorbide mononitrate 60 MG 24 hr tablet  Commonly known as:  IMDUR  Take 2 tablets (120 mg total) by mouth once daily.     losartan 100 MG tablet  Commonly known as:  COZAAR  Take 1 tablet (100 mg total) by mouth once daily.     mupirocin 2 % ointment  Commonly known as:  BACTROBAN  Apply topically 3 (three) times daily.     nitroglycerin 0.6 MG Subl  Commonly known as:  NITROSTAT  Place 1 tablet (0.6 mg total) under the tongue every 5 (five) minutes as needed (max 3/ per episode).     omeprazole 20 MG capsule  Commonly known as:  PRILOSEC  TAKE 1 CAPSULE TWICE DAILY     oxyCODONE-acetaminophen 5-325 mg per tablet  Commonly known as:  PERCOCET  Take 1 tablet by mouth every 6 (six) hours as needed for Pain.            Indwelling Lines/Drains at time of discharge:   Lines/Drains/Airways     None                 Time spent on the discharge of patient: > 30 minutes  Patient was seen and examined on the date of discharge and determined to be suitable for discharge.         Matilda Soriano NP  Department of Hospital Medicine  [facility]

## 2020-01-17 ENCOUNTER — OFFICE VISIT (OUTPATIENT)
Dept: FAMILY MEDICINE | Facility: CLINIC | Age: 70
End: 2020-01-17
Payer: MEDICARE

## 2020-01-17 ENCOUNTER — PATIENT OUTREACH (OUTPATIENT)
Dept: ADMINISTRATIVE | Facility: CLINIC | Age: 70
End: 2020-01-17

## 2020-01-17 VITALS
BODY MASS INDEX: 23.2 KG/M2 | WEIGHT: 175.81 LBS | DIASTOLIC BLOOD PRESSURE: 60 MMHG | HEART RATE: 64 BPM | OXYGEN SATURATION: 96 % | SYSTOLIC BLOOD PRESSURE: 122 MMHG | TEMPERATURE: 98 F

## 2020-01-17 DIAGNOSIS — I50.31 ACUTE DIASTOLIC CONGESTIVE HEART FAILURE: ICD-10-CM

## 2020-01-17 DIAGNOSIS — J18.9 PNEUMONIA DUE TO INFECTIOUS ORGANISM, UNSPECIFIED LATERALITY, UNSPECIFIED PART OF LUNG: ICD-10-CM

## 2020-01-17 DIAGNOSIS — Z09 HOSPITAL DISCHARGE FOLLOW-UP: Primary | ICD-10-CM

## 2020-01-17 PROCEDURE — 99495 TCM SERVICES (MODERATE COMPLEXITY): ICD-10-PCS | Mod: HCNC,S$GLB,, | Performed by: FAMILY MEDICINE

## 2020-01-17 PROCEDURE — 99999 PR PBB SHADOW E&M-EST. PATIENT-LVL III: CPT | Mod: PBBFAC,HCNC,, | Performed by: FAMILY MEDICINE

## 2020-01-17 PROCEDURE — 99495 TRANSJ CARE MGMT MOD F2F 14D: CPT | Mod: HCNC,S$GLB,, | Performed by: FAMILY MEDICINE

## 2020-01-17 PROCEDURE — 99999 PR PBB SHADOW E&M-EST. PATIENT-LVL III: ICD-10-PCS | Mod: PBBFAC,HCNC,, | Performed by: FAMILY MEDICINE

## 2020-01-17 NOTE — PATIENT INSTRUCTIONS
Heart Failure: Warning Signs of a Flare-Up  You have a condition called heart failure. Once you have heart failure, flare-ups can happen. Below are signs that can mean your heart failure is getting worse. If you notice any of these warning signs, call your healthcare provider.  Swelling    · Your feet, ankles, or lower legs get puffier.  · You notice skin changes on your lower legs.  · Your shoes feel too tight.  · Your clothes are tighter in the waist.  · You have trouble getting rings on or off your fingers.  Shortness of breath  · You have to breathe harder even when youre doing your normal activities or when youre resting.  · You are short of breath walking up stairs or even short distances.  · You wake up at night short of breath or coughing.  · You need to use more pillows or sit up to sleep.  · You wake up tired or restless.  Other warning signs  · You feel weaker, dizzy, or more tired.  · You have chest pain or changes in your heartbeat.  · You have a cough that wont go away.  · You cant remember things or dont feel like eating.  Tracking your weight  Gaining weight is often the first warning sign that heart failure is getting worse. Gaining even a few pounds can be a sign that your body is retaining excess water and salt. Weighing yourself each day in the morning after you urinate and before you eat, is the best way to know if you're retaining water. Get a scale that is easy to read and make sure you wear the same clothes and use the same scale every time you weigh. Your healthcare provider will show you how to track your weight. Call your doctor if you gain more than 2 pounds in 1 day, 5 pounds in 1 week, or whatever weight gain you were told to report by your doctor. This is often a sign of worsening heart failure and needs to be evaluated and treated before it compromises your breathing. Your doctor will tell you what to do next.    Date Last Reviewed: 3/15/2016  © 4563-1085 [employer] [address] [place] [id number]. All rights reserved. This information is not intended as a substitute for professional medical care. Always follow your healthcare professional's instructions.

## 2020-01-17 NOTE — PLAN OF CARE
01/17/20 0958   Final Note   Assessment Type Final Discharge Note   Anticipated Discharge Disposition Home   Discharge plans and expectations educations in teach back method with documentation complete? Yes     Patient discharge with no needs as anticipated.

## 2020-01-30 ENCOUNTER — OFFICE VISIT (OUTPATIENT)
Dept: CARDIOLOGY | Facility: CLINIC | Age: 70
End: 2020-01-30
Payer: MEDICARE

## 2020-01-30 VITALS
OXYGEN SATURATION: 97 % | SYSTOLIC BLOOD PRESSURE: 107 MMHG | HEIGHT: 73 IN | DIASTOLIC BLOOD PRESSURE: 65 MMHG | BODY MASS INDEX: 23.96 KG/M2 | WEIGHT: 180.75 LBS | HEART RATE: 63 BPM

## 2020-01-30 DIAGNOSIS — I73.9 PVD (PERIPHERAL VASCULAR DISEASE): Chronic | ICD-10-CM

## 2020-01-30 DIAGNOSIS — I65.22 LEFT CAROTID ARTERY STENOSIS: ICD-10-CM

## 2020-01-30 DIAGNOSIS — N18.4 CKD STAGE G4/A3, GFR 15-29 AND ALBUMIN CREATININE RATIO >300 MG/G: ICD-10-CM

## 2020-01-30 DIAGNOSIS — I25.10 CORONARY ARTERY DISEASE INVOLVING NATIVE CORONARY ARTERY OF NATIVE HEART WITHOUT ANGINA PECTORIS: ICD-10-CM

## 2020-01-30 DIAGNOSIS — N18.30 CKD (CHRONIC KIDNEY DISEASE) STAGE 3, GFR 30-59 ML/MIN: Primary | ICD-10-CM

## 2020-01-30 PROCEDURE — 1101F PT FALLS ASSESS-DOCD LE1/YR: CPT | Mod: HCNC,CPTII,S$GLB, | Performed by: INTERNAL MEDICINE

## 2020-01-30 PROCEDURE — 1101F PR PT FALLS ASSESS DOC 0-1 FALLS W/OUT INJ PAST YR: ICD-10-PCS | Mod: HCNC,CPTII,S$GLB, | Performed by: INTERNAL MEDICINE

## 2020-01-30 PROCEDURE — 3074F PR MOST RECENT SYSTOLIC BLOOD PRESSURE < 130 MM HG: ICD-10-PCS | Mod: HCNC,CPTII,S$GLB, | Performed by: INTERNAL MEDICINE

## 2020-01-30 PROCEDURE — 1126F PR PAIN SEVERITY QUANTIFIED, NO PAIN PRESENT: ICD-10-PCS | Mod: HCNC,S$GLB,, | Performed by: INTERNAL MEDICINE

## 2020-01-30 PROCEDURE — 1159F PR MEDICATION LIST DOCUMENTED IN MEDICAL RECORD: ICD-10-PCS | Mod: HCNC,S$GLB,, | Performed by: INTERNAL MEDICINE

## 2020-01-30 PROCEDURE — 3074F SYST BP LT 130 MM HG: CPT | Mod: HCNC,CPTII,S$GLB, | Performed by: INTERNAL MEDICINE

## 2020-01-30 PROCEDURE — 99999 PR PBB SHADOW E&M-EST. PATIENT-LVL III: CPT | Mod: PBBFAC,HCNC,, | Performed by: INTERNAL MEDICINE

## 2020-01-30 PROCEDURE — 99214 OFFICE O/P EST MOD 30 MIN: CPT | Mod: HCNC,S$GLB,, | Performed by: INTERNAL MEDICINE

## 2020-01-30 PROCEDURE — 1159F MED LIST DOCD IN RCRD: CPT | Mod: HCNC,S$GLB,, | Performed by: INTERNAL MEDICINE

## 2020-01-30 PROCEDURE — 3078F DIAST BP <80 MM HG: CPT | Mod: HCNC,CPTII,S$GLB, | Performed by: INTERNAL MEDICINE

## 2020-01-30 PROCEDURE — 99291 CRITICAL CARE FIRST HOUR: CPT | Mod: HCNC

## 2020-01-30 PROCEDURE — 3078F PR MOST RECENT DIASTOLIC BLOOD PRESSURE < 80 MM HG: ICD-10-PCS | Mod: HCNC,CPTII,S$GLB, | Performed by: INTERNAL MEDICINE

## 2020-01-30 PROCEDURE — 1126F AMNT PAIN NOTED NONE PRSNT: CPT | Mod: HCNC,S$GLB,, | Performed by: INTERNAL MEDICINE

## 2020-01-30 PROCEDURE — 99214 PR OFFICE/OUTPT VISIT, EST, LEVL IV, 30-39 MIN: ICD-10-PCS | Mod: HCNC,S$GLB,, | Performed by: INTERNAL MEDICINE

## 2020-01-30 PROCEDURE — 99999 PR PBB SHADOW E&M-EST. PATIENT-LVL III: ICD-10-PCS | Mod: PBBFAC,HCNC,, | Performed by: INTERNAL MEDICINE

## 2020-01-31 ENCOUNTER — TELEPHONE (OUTPATIENT)
Dept: RADIOLOGY | Facility: HOSPITAL | Age: 70
End: 2020-01-31

## 2020-01-31 ENCOUNTER — HOSPITAL ENCOUNTER (INPATIENT)
Facility: HOSPITAL | Age: 70
LOS: 6 days | Discharge: HOME HEALTH | DRG: 659 | End: 2020-02-06
Attending: EMERGENCY MEDICINE | Admitting: INTERNAL MEDICINE
Payer: MEDICARE

## 2020-01-31 DIAGNOSIS — N13.30 HYDRONEPHROSIS OF LEFT KIDNEY: ICD-10-CM

## 2020-01-31 DIAGNOSIS — R09.89 PULMONARY CONGESTION: ICD-10-CM

## 2020-01-31 DIAGNOSIS — J44.1 COPD EXACERBATION: ICD-10-CM

## 2020-01-31 DIAGNOSIS — L97.529 ULCER OF LEFT FOOT, UNSPECIFIED ULCER STAGE: ICD-10-CM

## 2020-01-31 DIAGNOSIS — D64.9 ACUTE ON CHRONIC ANEMIA: Primary | ICD-10-CM

## 2020-01-31 DIAGNOSIS — N17.1 ACUTE RENAL FAILURE WITH ACUTE RENAL CORTICAL NECROSIS SUPERIMPOSED ON STAGE 4 CHRONIC KIDNEY DISEASE: ICD-10-CM

## 2020-01-31 DIAGNOSIS — L97.522 SKIN ULCER OF LEFT FOOT WITH FAT LAYER EXPOSED: ICD-10-CM

## 2020-01-31 DIAGNOSIS — N18.4 ACUTE RENAL FAILURE WITH ACUTE RENAL CORTICAL NECROSIS SUPERIMPOSED ON STAGE 4 CHRONIC KIDNEY DISEASE: ICD-10-CM

## 2020-01-31 DIAGNOSIS — N13.5 URETERAL STRICTURE, LEFT: Chronic | ICD-10-CM

## 2020-01-31 DIAGNOSIS — J96.01 ACUTE RESPIRATORY FAILURE WITH HYPOXIA: ICD-10-CM

## 2020-01-31 DIAGNOSIS — N17.9 AKI (ACUTE KIDNEY INJURY): ICD-10-CM

## 2020-01-31 DIAGNOSIS — R07.89 CHEST PAIN, MIDSTERNAL: ICD-10-CM

## 2020-01-31 DIAGNOSIS — I50.33 ACUTE ON CHRONIC DIASTOLIC HEART FAILURE: ICD-10-CM

## 2020-01-31 DIAGNOSIS — L97.502 ULCER OF FOOT WITH FAT LAYER EXPOSED, UNSPECIFIED LATERALITY: ICD-10-CM

## 2020-01-31 DIAGNOSIS — N18.9 ACUTE RENAL FAILURE SUPERIMPOSED ON CHRONIC KIDNEY DISEASE: ICD-10-CM

## 2020-01-31 DIAGNOSIS — N17.9 ACUTE RENAL FAILURE SUPERIMPOSED ON CHRONIC KIDNEY DISEASE: ICD-10-CM

## 2020-01-31 DIAGNOSIS — R07.9 CHEST PAIN: ICD-10-CM

## 2020-01-31 LAB
ABO + RH BLD: NORMAL
ALBUMIN SERPL BCP-MCNC: 3 G/DL (ref 3.5–5.2)
ALP SERPL-CCNC: 131 U/L (ref 55–135)
ALT SERPL W/O P-5'-P-CCNC: <5 U/L (ref 10–44)
ANION GAP SERPL CALC-SCNC: 11 MMOL/L (ref 8–16)
ANION GAP SERPL CALC-SCNC: 15 MMOL/L (ref 8–16)
AST SERPL-CCNC: 6 U/L (ref 10–40)
BACTERIA #/AREA URNS HPF: ABNORMAL /HPF
BASOPHILS # BLD AUTO: 0.02 K/UL (ref 0–0.2)
BASOPHILS # BLD AUTO: 0.02 K/UL (ref 0–0.2)
BASOPHILS NFR BLD: 0.3 % (ref 0–1.9)
BASOPHILS NFR BLD: 0.4 % (ref 0–1.9)
BILIRUB SERPL-MCNC: 0.3 MG/DL (ref 0.1–1)
BILIRUB UR QL STRIP: NEGATIVE
BLD GP AB SCN CELLS X3 SERPL QL: NORMAL
BLD PROD TYP BPU: NORMAL
BLOOD UNIT EXPIRATION DATE: NORMAL
BLOOD UNIT TYPE CODE: 5100
BLOOD UNIT TYPE: NORMAL
BNP SERPL-MCNC: 1253 PG/ML (ref 0–99)
BUN SERPL-MCNC: 61 MG/DL (ref 8–23)
BUN SERPL-MCNC: 62 MG/DL (ref 8–23)
CALCIUM SERPL-MCNC: 7.6 MG/DL (ref 8.7–10.5)
CALCIUM SERPL-MCNC: 7.6 MG/DL (ref 8.7–10.5)
CHLORIDE SERPL-SCNC: 108 MMOL/L (ref 95–110)
CHLORIDE SERPL-SCNC: 110 MMOL/L (ref 95–110)
CLARITY UR: CLEAR
CO2 SERPL-SCNC: 11 MMOL/L (ref 23–29)
CO2 SERPL-SCNC: 12 MMOL/L (ref 23–29)
CODING SYSTEM: NORMAL
COLOR UR: YELLOW
CREAT SERPL-MCNC: 6.6 MG/DL (ref 0.5–1.4)
CREAT SERPL-MCNC: 6.6 MG/DL (ref 0.5–1.4)
CREAT UR-MCNC: 90 MG/DL (ref 23–375)
CREAT UR-MCNC: 91 MG/DL (ref 23–375)
CRP SERPL-MCNC: 119.6 MG/L (ref 0–8.2)
DIFFERENTIAL METHOD: ABNORMAL
DIFFERENTIAL METHOD: ABNORMAL
DISPENSE STATUS: NORMAL
EOSINOPHIL # BLD AUTO: 0.1 K/UL (ref 0–0.5)
EOSINOPHIL # BLD AUTO: 0.2 K/UL (ref 0–0.5)
EOSINOPHIL NFR BLD: 1.1 % (ref 0–8)
EOSINOPHIL NFR BLD: 2.4 % (ref 0–8)
ERYTHROCYTE [DISTWIDTH] IN BLOOD BY AUTOMATED COUNT: 15.7 % (ref 11.5–14.5)
ERYTHROCYTE [DISTWIDTH] IN BLOOD BY AUTOMATED COUNT: 16 % (ref 11.5–14.5)
ERYTHROCYTE [SEDIMENTATION RATE] IN BLOOD BY WESTERGREN METHOD: 80 MM/HR (ref 0–10)
EST. GFR  (AFRICAN AMERICAN): 9 ML/MIN/1.73 M^2
EST. GFR  (AFRICAN AMERICAN): 9 ML/MIN/1.73 M^2
EST. GFR  (NON AFRICAN AMERICAN): 8 ML/MIN/1.73 M^2
EST. GFR  (NON AFRICAN AMERICAN): 8 ML/MIN/1.73 M^2
GLUCOSE SERPL-MCNC: 109 MG/DL (ref 70–110)
GLUCOSE SERPL-MCNC: 109 MG/DL (ref 70–110)
GLUCOSE UR QL STRIP: NEGATIVE
HCT VFR BLD AUTO: 22.8 % (ref 40–54)
HCT VFR BLD AUTO: 22.8 % (ref 40–54)
HCT VFR BLD AUTO: 25.6 % (ref 40–54)
HGB BLD-MCNC: 6.7 G/DL (ref 14–18)
HGB BLD-MCNC: 6.9 G/DL (ref 14–18)
HGB BLD-MCNC: 7.8 G/DL (ref 14–18)
HGB UR QL STRIP: ABNORMAL
HYALINE CASTS #/AREA URNS LPF: 2 /LPF
IMM GRANULOCYTES # BLD AUTO: 0.02 K/UL (ref 0–0.04)
IMM GRANULOCYTES # BLD AUTO: 0.03 K/UL (ref 0–0.04)
IMM GRANULOCYTES NFR BLD AUTO: 0.4 % (ref 0–0.5)
IMM GRANULOCYTES NFR BLD AUTO: 0.5 % (ref 0–0.5)
KETONES UR QL STRIP: NEGATIVE
LEUKOCYTE ESTERASE UR QL STRIP: NEGATIVE
LYMPHOCYTES # BLD AUTO: 0.5 K/UL (ref 1–4.8)
LYMPHOCYTES # BLD AUTO: 0.6 K/UL (ref 1–4.8)
LYMPHOCYTES NFR BLD: 12.4 % (ref 18–48)
LYMPHOCYTES NFR BLD: 8.2 % (ref 18–48)
MCH RBC QN AUTO: 26.8 PG (ref 27–31)
MCH RBC QN AUTO: 27.1 PG (ref 27–31)
MCHC RBC AUTO-ENTMCNC: 29.4 G/DL (ref 32–36)
MCHC RBC AUTO-ENTMCNC: 30.3 G/DL (ref 32–36)
MCV RBC AUTO: 89 FL (ref 82–98)
MCV RBC AUTO: 91 FL (ref 82–98)
MICROSCOPIC COMMENT: ABNORMAL
MONOCYTES # BLD AUTO: 0.4 K/UL (ref 0.3–1)
MONOCYTES # BLD AUTO: 0.4 K/UL (ref 0.3–1)
MONOCYTES NFR BLD: 6.4 % (ref 4–15)
MONOCYTES NFR BLD: 8.2 % (ref 4–15)
NEUTROPHILS # BLD AUTO: 3.5 K/UL (ref 1.8–7.7)
NEUTROPHILS # BLD AUTO: 5.2 K/UL (ref 1.8–7.7)
NEUTROPHILS NFR BLD: 77.5 % (ref 38–73)
NEUTROPHILS NFR BLD: 82.2 % (ref 38–73)
NITRITE UR QL STRIP: NEGATIVE
NRBC BLD-RTO: 0 /100 WBC
NRBC BLD-RTO: 0 /100 WBC
NUM UNITS TRANS PACKED RBC: NORMAL
PH UR STRIP: 6 [PH] (ref 5–8)
PLATELET # BLD AUTO: 202 K/UL (ref 150–350)
PLATELET # BLD AUTO: 212 K/UL (ref 150–350)
PMV BLD AUTO: 10.2 FL (ref 9.2–12.9)
PMV BLD AUTO: 10.4 FL (ref 9.2–12.9)
POTASSIUM SERPL-SCNC: 5.1 MMOL/L (ref 3.5–5.1)
POTASSIUM SERPL-SCNC: 5.4 MMOL/L (ref 3.5–5.1)
PROT SERPL-MCNC: 7.1 G/DL (ref 6–8.4)
PROT UR QL STRIP: ABNORMAL
PROT UR-MCNC: 70 MG/DL (ref 0–15)
PROT/CREAT UR: 0.77 MG/G{CREAT} (ref 0–0.2)
RBC # BLD AUTO: 2.5 M/UL (ref 4.6–6.2)
RBC # BLD AUTO: 2.55 M/UL (ref 4.6–6.2)
RBC #/AREA URNS HPF: 2 /HPF (ref 0–4)
SODIUM SERPL-SCNC: 133 MMOL/L (ref 136–145)
SODIUM SERPL-SCNC: 134 MMOL/L (ref 136–145)
SODIUM UR-SCNC: 39 MMOL/L (ref 20–250)
SP GR UR STRIP: 1.02 (ref 1–1.03)
TROPONIN I SERPL DL<=0.01 NG/ML-MCNC: 0.02 NG/ML (ref 0–0.03)
TROPONIN I SERPL DL<=0.01 NG/ML-MCNC: <0.006 NG/ML (ref 0–0.03)
URATE SERPL-MCNC: 11 MG/DL (ref 3.4–7)
URN SPEC COLLECT METH UR: ABNORMAL
UROBILINOGEN UR STRIP-ACNC: NEGATIVE EU/DL
WBC # BLD AUTO: 4.51 K/UL (ref 3.9–12.7)
WBC # BLD AUTO: 6.36 K/UL (ref 3.9–12.7)
WBC #/AREA URNS HPF: 10 /HPF (ref 0–5)

## 2020-01-31 PROCEDURE — 99220 PR INITIAL OBSERVATION CARE,LEVL III: CPT | Mod: HCNC,,, | Performed by: PODIATRIST

## 2020-01-31 PROCEDURE — 80048 BASIC METABOLIC PNL TOTAL CA: CPT | Mod: HCNC

## 2020-01-31 PROCEDURE — 25000003 PHARM REV CODE 250: Mod: HCNC | Performed by: INTERNAL MEDICINE

## 2020-01-31 PROCEDURE — 86140 C-REACTIVE PROTEIN: CPT | Mod: HCNC

## 2020-01-31 PROCEDURE — 93010 ELECTROCARDIOGRAM REPORT: CPT | Mod: HCNC,,, | Performed by: INTERNAL MEDICINE

## 2020-01-31 PROCEDURE — 86920 COMPATIBILITY TEST SPIN: CPT | Mod: HCNC

## 2020-01-31 PROCEDURE — 84300 ASSAY OF URINE SODIUM: CPT | Mod: HCNC

## 2020-01-31 PROCEDURE — 99223 1ST HOSP IP/OBS HIGH 75: CPT | Mod: HCNC,,, | Performed by: INTERNAL MEDICINE

## 2020-01-31 PROCEDURE — 85025 COMPLETE CBC W/AUTO DIFF WBC: CPT | Mod: HCNC

## 2020-01-31 PROCEDURE — 82570 ASSAY OF URINE CREATININE: CPT | Mod: HCNC

## 2020-01-31 PROCEDURE — 93005 ELECTROCARDIOGRAM TRACING: CPT | Mod: HCNC

## 2020-01-31 PROCEDURE — 21400001 HC TELEMETRY ROOM: Mod: HCNC

## 2020-01-31 PROCEDURE — 96375 TX/PRO/DX INJ NEW DRUG ADDON: CPT | Performed by: EMERGENCY MEDICINE

## 2020-01-31 PROCEDURE — 96374 THER/PROPH/DIAG INJ IV PUSH: CPT | Performed by: EMERGENCY MEDICINE

## 2020-01-31 PROCEDURE — 96361 HYDRATE IV INFUSION ADD-ON: CPT | Performed by: EMERGENCY MEDICINE

## 2020-01-31 PROCEDURE — 82570 ASSAY OF URINE CREATININE: CPT | Mod: 91,HCNC

## 2020-01-31 PROCEDURE — A4217 STERILE WATER/SALINE, 500 ML: HCPCS | Mod: HCNC | Performed by: FAMILY MEDICINE

## 2020-01-31 PROCEDURE — 86901 BLOOD TYPING SEROLOGIC RH(D): CPT | Mod: HCNC

## 2020-01-31 PROCEDURE — 36430 TRANSFUSION BLD/BLD COMPNT: CPT | Mod: HCNC

## 2020-01-31 PROCEDURE — 85018 HEMOGLOBIN: CPT | Mod: HCNC

## 2020-01-31 PROCEDURE — 99220 PR INITIAL OBSERVATION CARE,LEVL III: ICD-10-PCS | Mod: HCNC,,, | Performed by: PODIATRIST

## 2020-01-31 PROCEDURE — 84550 ASSAY OF BLOOD/URIC ACID: CPT | Mod: HCNC

## 2020-01-31 PROCEDURE — 63600175 PHARM REV CODE 636 W HCPCS: Mod: HCNC | Performed by: FAMILY MEDICINE

## 2020-01-31 PROCEDURE — 36415 COLL VENOUS BLD VENIPUNCTURE: CPT | Mod: HCNC

## 2020-01-31 PROCEDURE — 83880 ASSAY OF NATRIURETIC PEPTIDE: CPT | Mod: HCNC

## 2020-01-31 PROCEDURE — 99223 PR INITIAL HOSPITAL CARE,LEVL III: ICD-10-PCS | Mod: HCNC,,, | Performed by: INTERNAL MEDICINE

## 2020-01-31 PROCEDURE — 85014 HEMATOCRIT: CPT | Mod: HCNC

## 2020-01-31 PROCEDURE — 87076 CULTURE ANAEROBE IDENT EACH: CPT | Mod: HCNC

## 2020-01-31 PROCEDURE — 85651 RBC SED RATE NONAUTOMATED: CPT | Mod: HCNC

## 2020-01-31 PROCEDURE — 81000 URINALYSIS NONAUTO W/SCOPE: CPT | Mod: HCNC

## 2020-01-31 PROCEDURE — 84484 ASSAY OF TROPONIN QUANT: CPT | Mod: HCNC

## 2020-01-31 PROCEDURE — 25000003 PHARM REV CODE 250: Mod: HCNC | Performed by: FAMILY MEDICINE

## 2020-01-31 PROCEDURE — P9016 RBC LEUKOCYTES REDUCED: HCPCS | Mod: HCNC

## 2020-01-31 PROCEDURE — 93010 EKG 12-LEAD: ICD-10-PCS | Mod: HCNC,,, | Performed by: INTERNAL MEDICINE

## 2020-01-31 PROCEDURE — 87075 CULTR BACTERIA EXCEPT BLOOD: CPT | Mod: HCNC

## 2020-01-31 PROCEDURE — 87070 CULTURE OTHR SPECIMN AEROBIC: CPT | Mod: HCNC

## 2020-01-31 PROCEDURE — 80053 COMPREHEN METABOLIC PANEL: CPT | Mod: HCNC

## 2020-01-31 PROCEDURE — 63600175 PHARM REV CODE 636 W HCPCS: Mod: HCNC | Performed by: INTERNAL MEDICINE

## 2020-01-31 RX ORDER — IBUPROFEN 200 MG
24 TABLET ORAL
Status: DISCONTINUED | OUTPATIENT
Start: 2020-01-31 | End: 2020-02-06 | Stop reason: HOSPADM

## 2020-01-31 RX ORDER — FUROSEMIDE 20 MG/1
20 TABLET ORAL DAILY
Status: DISCONTINUED | OUTPATIENT
Start: 2020-01-31 | End: 2020-01-31

## 2020-01-31 RX ORDER — NITROGLYCERIN 0.4 MG/1
0.4 TABLET SUBLINGUAL EVERY 5 MIN PRN
Status: DISCONTINUED | OUTPATIENT
Start: 2020-01-31 | End: 2020-02-06 | Stop reason: HOSPADM

## 2020-01-31 RX ORDER — ASPIRIN 325 MG
325 TABLET ORAL
Status: DISCONTINUED | OUTPATIENT
Start: 2020-01-31 | End: 2020-01-31

## 2020-01-31 RX ORDER — HYDROCODONE BITARTRATE AND ACETAMINOPHEN 500; 5 MG/1; MG/1
TABLET ORAL
Status: DISCONTINUED | OUTPATIENT
Start: 2020-01-31 | End: 2020-02-03

## 2020-01-31 RX ORDER — AMLODIPINE BESYLATE 10 MG/1
10 TABLET ORAL DAILY
Status: DISCONTINUED | OUTPATIENT
Start: 2020-01-31 | End: 2020-02-06 | Stop reason: HOSPADM

## 2020-01-31 RX ORDER — PANTOPRAZOLE SODIUM 40 MG/1
40 TABLET, DELAYED RELEASE ORAL DAILY
Status: DISCONTINUED | OUTPATIENT
Start: 2020-01-31 | End: 2020-02-06 | Stop reason: HOSPADM

## 2020-01-31 RX ORDER — LOSARTAN POTASSIUM 50 MG/1
100 TABLET ORAL DAILY
Status: DISCONTINUED | OUTPATIENT
Start: 2020-01-31 | End: 2020-02-01

## 2020-01-31 RX ORDER — GLUCAGON 1 MG
1 KIT INJECTION
Status: DISCONTINUED | OUTPATIENT
Start: 2020-01-31 | End: 2020-02-06 | Stop reason: HOSPADM

## 2020-01-31 RX ORDER — CARVEDILOL 12.5 MG/1
25 TABLET ORAL 2 TIMES DAILY WITH MEALS
Status: DISCONTINUED | OUTPATIENT
Start: 2020-01-31 | End: 2020-02-06 | Stop reason: HOSPADM

## 2020-01-31 RX ORDER — SODIUM CHLORIDE 0.9 % (FLUSH) 0.9 %
10 SYRINGE (ML) INJECTION
Status: DISCONTINUED | OUTPATIENT
Start: 2020-01-31 | End: 2020-02-06 | Stop reason: HOSPADM

## 2020-01-31 RX ORDER — ASPIRIN 81 MG/1
81 TABLET ORAL DAILY
Status: DISCONTINUED | OUTPATIENT
Start: 2020-01-31 | End: 2020-01-31

## 2020-01-31 RX ORDER — IBUPROFEN 200 MG
16 TABLET ORAL
Status: DISCONTINUED | OUTPATIENT
Start: 2020-01-31 | End: 2020-02-06 | Stop reason: HOSPADM

## 2020-01-31 RX ORDER — LINEZOLID 2 MG/ML
600 INJECTION, SOLUTION INTRAVENOUS
Status: DISCONTINUED | OUTPATIENT
Start: 2020-01-31 | End: 2020-02-01

## 2020-01-31 RX ORDER — CLOPIDOGREL BISULFATE 75 MG/1
75 TABLET ORAL DAILY
Status: DISCONTINUED | OUTPATIENT
Start: 2020-01-31 | End: 2020-01-31

## 2020-01-31 RX ORDER — SODIUM CHLORIDE 9 MG/ML
INJECTION, SOLUTION INTRAVENOUS CONTINUOUS
Status: DISCONTINUED | OUTPATIENT
Start: 2020-01-31 | End: 2020-01-31

## 2020-01-31 RX ADMIN — LINEZOLID 600 MG: 600 INJECTION, SOLUTION INTRAVENOUS at 05:01

## 2020-01-31 RX ADMIN — CLOPIDOGREL BISULFATE 75 MG: 75 TABLET ORAL at 08:01

## 2020-01-31 RX ADMIN — LOSARTAN POTASSIUM 100 MG: 50 TABLET, FILM COATED ORAL at 08:01

## 2020-01-31 RX ADMIN — AMLODIPINE BESYLATE 10 MG: 10 TABLET ORAL at 08:01

## 2020-01-31 RX ADMIN — ASPIRIN 81 MG: 81 TABLET, COATED ORAL at 08:01

## 2020-01-31 RX ADMIN — PANTOPRAZOLE SODIUM 40 MG: 40 TABLET, DELAYED RELEASE ORAL at 08:01

## 2020-01-31 RX ADMIN — CEFTRIAXONE 2 G: 2 INJECTION, SOLUTION INTRAVENOUS at 04:01

## 2020-01-31 RX ADMIN — SODIUM BICARBONATE: 84 INJECTION, SOLUTION INTRAVENOUS at 10:01

## 2020-01-31 RX ADMIN — SODIUM CHLORIDE: 0.9 INJECTION, SOLUTION INTRAVENOUS at 04:01

## 2020-01-31 RX ADMIN — CARVEDILOL 25 MG: 12.5 TABLET, FILM COATED ORAL at 08:01

## 2020-01-31 RX ADMIN — LINEZOLID 600 MG: 600 INJECTION, SOLUTION INTRAVENOUS at 04:01

## 2020-01-31 RX ADMIN — CARVEDILOL 25 MG: 12.5 TABLET, FILM COATED ORAL at 04:01

## 2020-01-31 NOTE — SIGNIFICANT EVENT
Severe left sided hydronephrosis with ureteral stent visualized. In setting of acute renal failure concern with this being cause. This was discussed with Dr. Brennen Maldonado who feels that hydronephrosis is a chronic issue and not sole cause of acute renal failure. Patient already has stent and may need IR consult for nephrostomy tube placement. Will consult IR on case. Attempted to contact patient's urologist earlier today Dr. Jin, left voicemail. Will dc asa and plavix due to pending procedure. Currently receiving 1 unit prbc. Will transfuse < 7.     Discussed case with Dr. Jin. Concerned with outlet obstruction. Recommended getting bladder scan which was negative. Recommendations were to workup other causes of renal failure as left sided hydronephrosis alone should not cause acute renal failure with the right kidney working.     Started sterile water with 150meq bicarb this am. Cont to monitor urine output. Fena calculated was 2.2% indicating intrinsic renal disease.     Case was discussed with IR earlier. Concerned with bleeding due to asa and plavix use. Does not recommend nephrostomy tube at this time due to risks. If any procedure needs to be done, recommended ureteral stent changed instead as it is less invasive.

## 2020-01-31 NOTE — ASSESSMENT & PLAN NOTE
Continue to follow cultures taken from ED on left foot ulceration  Bone scan scheduled per ID. Await results  Recommend daily dressing changes with betadine to gauze, Dry sterile dressing.  Continue to follow AM labs

## 2020-01-31 NOTE — ASSESSMENT & PLAN NOTE
Left-sided hydronephrosis is new and probably needs urology consultation May are may not need percutaneous nephrostomy tube versus cystoscopy

## 2020-01-31 NOTE — NURSING
Patient transferred independently to bed from stretcher. Monitor [id number] applied and verified. Primary nurse at the bed side. Will continue to monitor.

## 2020-01-31 NOTE — H&P
[facility] Hospital Medicine  History & Physical    Patient Name: Kodi Ribeiro  MRN: [id number]  Admission Date: 1/31/2020  Attending Physician: Lavell Waldrop MD   Primary Care Provider: Norma Nuñez MD         Patient information was obtained from patient, past medical records and ER records.     Subjective:     Principal Problem:<principal problem not specified>    Chief Complaint:   Chief Complaint   Patient presents with    Chest Pain     onset approximately an hour ago. relieved after x3 nitro and ASA        HPI:    69 year old man with extensive past medical and surgery history of    stroke, spinal cord disease, peripheral vascular disease, MI (per patient, 2000 & 2012), lipoma of colon, infection of aortic graft, HTN, HLD, hyperglycemia, horseshoe kidney, hemorrhoids, GERD. He was recently discharged from the Hospital on  1/15/2020. He presented at this time with chest pain that started few hours prior to presentation.Pain was described a sharp ,non radiating ,not associated with diaphoresis and worsened with exertion. He called his wife and EMS was called.   Since admission, Lab data showed   Component      Latest Ref Rng & Units 1/31/2020 1/30/2020 1/15/2020               Hemoglobin      14.0 - 18.0 g/dL 6.9 (L) 7.0 (L) 7.7 (L)     Component      Latest Ref Rng & Units 1/31/2020 1/30/2020 1/15/2020               Creatinine      0.5 - 1.4 mg/dL 6.6 (H) 6.0 (H) 2.4 (H)   He also reported increased pain from the left foot ulceration and when the dressing was opened-sánchez pus was seen .  He will be admitted for symptomatic anemia and acute renal failure .    Past Medical History:   Diagnosis Date    Acute on chronic congestive heart failure 1/13/2020    Acute respiratory failure with hypoxia 1/14/2020    Analgesic nephropathy     Anemia     AP (angina pectoris) 1/11/2019    Arthritis     Colon polyp     Repeat colonoscopy due in 9/14    Coronary artery disease     Diverticulosis      colonoscopy 2/21/2014    Encounter for blood transfusion     GERD (gastroesophageal reflux disease)     Hemorrhoids     colonoscopy 2/21/2014    Horseshoe kidney     Hyperglycemia 3/17/2014    Hyperlipidemia     Hypertension     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    Late complications of amputation stump     rseolved with further amputation( MRSA then none since 2014)    Lipoma of colon     colonoscopy 2/21/2014    Myocardial infarction     per patient 2000 & 9/2012    Peripheral vascular disease     Phantom limb syndrome     patient reports only intermittent not problematic, not worsening    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    Spinal cord disease     L4L5 disc    Stroke     Tobacco dependence     resolved    Ureteral stent retained        Past Surgical History:   Procedure Laterality Date    ABDOMINAL AORTIC ANEURYSM REPAIR      ABDOMINAL AORTIC ANEURYSM REPAIR  1996/2014    AMPUTATION, LOWER LIMB      AORTA - BILATERAL FEMORAL ARTERY BYPASS GRAFT  2014    Left and right leg    CORONARY ANGIOPLASTY WITH STENT PLACEMENT  2000    Three placed in heart    CYSTOSCOPY W/ RETROGRADES Left 5/29/2018    Procedure: CYSTOSCOPY, WITH RETROGRADE PYELOGRAM;  Surgeon: Scooter Jin IV, MD;  Location: [facility];  Service: Urology;  Laterality: Left;    CYSTOSCOPY W/ URETERAL STENT PLACEMENT Left 5/29/2018    Procedure: CYSTOSCOPY, WITH URETERAL STENT INSERTION;  Surgeon: Scooter Jin IV, MD;  Location: [facility];  Service: Urology;  Laterality: Left;    CYSTOSCOPY W/ URETERAL STENT REMOVAL Left 5/29/2018    Procedure: CYSTOSCOPY, WITH URETERAL STENT REMOVAL;  Surgeon: Scooter Jin IV, MD;  Location: [facility];  Service: Urology;  Laterality: Left;    FOOT AMPUTATION THROUGH METATARSAL  1996    left    FOOT SURGERY Bilateral 1980's    per patient multiple toe amputations prior to.  partial foot amputation:first great toe then other toes     KIDNEY SURGERY  2014    per patient  separation of horseshoe kidney @ time of AAA repair    LEFT HEART CATHETERIZATION Left 3/7/2019    Procedure: CATHETERIZATION, HEART, LEFT;  Surgeon: Adriel Boone MD;  Location: [facility];  Service: Cardiology;  Laterality: Left;  630 admit for IV hydration  10am start    LUNG LOBECTOMY Right 1970s    per patient not cancer    right below knee amputation  2009 (approx)    SMALL INTESTINE SURGERY  2014    per patient partial @ time of aaa repair  not small bowel - large bowel bowel compromised bythtwe AAAbowel    TONSILLECTOMY  1955 aprox    URETERAL STENT PLACEMENT Left     annually replaced since 2012 or so  Dr Jin       Review of patient's allergies indicates:   Allergen Reactions    Morphine Itching       No current facility-administered medications on file prior to encounter.      Current Outpatient Medications on File Prior to Encounter   Medication Sig    amLODIPine (NORVASC) 10 MG tablet TAKE 1 TABLET EVERY DAY    aspirin (ECOTRIN) 81 MG EC tablet Take 1 tablet (81 mg total) by mouth once daily.    carvedilol (COREG) 25 MG tablet Take 1 tablet (25 mg total) by mouth 2 (two) times daily with meals.    clopidogrel (PLAVIX) 75 mg tablet TAKE 1 TABLET EVERY DAY    furosemide (LASIX) 20 MG tablet Take 1 tablet (20 mg total) by mouth once daily.    isosorbide mononitrate (IMDUR) 60 MG 24 hr tablet Take 2 tablets (120 mg total) by mouth once daily.    losartan (COZAAR) 100 MG tablet Take 1 tablet (100 mg total) by mouth once daily.    mupirocin (BACTROBAN) 2 % ointment Apply topically 3 (three) times daily.    nitroglycerin (NITROSTAT) 0.6 MG Subl Place 1 tablet (0.6 mg total) under the tongue every 5 (five) minutes as needed (max 3/ per episode).    omeprazole (PRILOSEC) 20 MG capsule TAKE 1 CAPSULE TWICE DAILY    cetirizine (ZYRTEC) 10 MG tablet Take 10 mg by mouth once daily.    [DISCONTINUED] oxyCODONE-acetaminophen (PERCOCET) 5-325 mg per tablet Take 1 tablet by mouth every 6  (six) hours as needed for Pain.     Family History     Problem Relation (Age of Onset)    COPD Mother    Cancer Mother    Diabetes Daughter    Heart disease Father        Tobacco Use    Smoking status: Former Smoker     Packs/day: 1.00     Years: 15.00     Pack years: 15.00     Last attempt to quit: 2009     Years since quittin.0    Smokeless tobacco: Never Used   Substance and Sexual Activity    Alcohol use: No    Drug use: No     Comment: Is on prescription opiod, no non prescribed use    Sexual activity: Not Currently     Partners: Female     Review of Systems   Constitutional: Negative for activity change, appetite change, chills, diaphoresis and fatigue.   HENT: Negative for congestion, dental problem, ear discharge, ear pain and facial swelling.    Eyes: Negative for pain, discharge and itching.   Respiratory: Positive for shortness of breath. Negative for apnea, cough and chest tightness.    Cardiovascular: Positive for chest pain. Negative for leg swelling.   Gastrointestinal: Negative for abdominal distention and abdominal pain.   Endocrine: Negative for cold intolerance, heat intolerance and polydipsia.   Genitourinary: Negative for difficulty urinating, dysuria and enuresis.   Musculoskeletal: Negative for arthralgias and back pain.   Skin: Positive for wound. Negative for color change and pallor.   Allergic/Immunologic: Negative for environmental allergies and food allergies.   Neurological: Negative for dizziness, facial asymmetry, light-headedness and headaches.   Hematological: Negative for adenopathy. Does not bruise/bleed easily.   Psychiatric/Behavioral: Negative for agitation and behavioral problems.     Objective:     Vital Signs (Most Recent):  Temp: 97.9 °F (36.6 °C) (20 0004)  Pulse: 68 (20)  Resp: (!) 23 (20)  BP: 119/67 (20)  SpO2: (!) 94 % (20) Vital Signs (24h Range):  Temp:  [97.9 °F (36.6 °C)] 97.9 °F (36.6 °C)  Pulse:   [63-76] 68  Resp:  [19-23] 23  SpO2:  [93 %-97 %] 94 %  BP: ()/(49-67) 119/67        Body mass index is 23.85 kg/m².    Physical Exam   Constitutional: He is oriented to person, place, and time. He appears well-developed and well-nourished.   HENT:   Head: Normocephalic and atraumatic.   Nose: Nose normal.   Eyes: EOM are normal.   PERRL   Neck: Normal range of motion. Neck supple.   Cardiovascular: Normal rate, regular rhythm and normal heart sounds.   Pulmonary/Chest: Effort normal and breath sounds normal. No respiratory distress. He has no wheezes. He has no rales.   Abdominal: Soft. Bowel sounds are normal. He exhibits no distension.   Musculoskeletal: Normal range of motion. He exhibits no edema.   PLEASE SEE PIC, RIGHT bka   Neurological: He is alert and oriented to person, place, and time.   Skin: Skin is dry.   Nursing note and vitals reviewed.        CRANIAL NERVES     CN III, IV, VI   Extraocular motions are normal.            Significant Labs:   BMP:   Recent Labs   Lab 01/31/20  0034      *   K 5.1      CO2 11*   BUN 61*   CREATININE 6.6*   CALCIUM 7.6*     CBC:   Recent Labs   Lab 01/30/20  1453 01/31/20  0034   WBC 5.34 6.36   HGB 7.0* 6.9*   HCT 24.2* 22.8*    212     All pertinent labs within the past 24 hours have been reviewed.    Significant Imaging: I have reviewed and interpreted all pertinent imaging results/findings within the past 24 hours.    Assessment/Plan:     Acute on chronic anemia    Will transfuse one unit of packed cell-monitor cbc in AM      Acute renal failure superimposed on chronic kidney disease    Will do renal ultrasound,start gentle hydration, nephrology consult, check renal function in AM       CKD (chronic kidney disease) stage 3, GFR 30-59 ml/min        Foot ulcer - Left Foot    Will do wound culture , podiatry consult -will need to rule out osteomyelitis , will do bone scan in AM   WILL start Rocephin/zyvox    Essential  hypertension    Will continue home medication of norvasc      VTE Risk Mitigation (From admission, onward)         Ordered     Reason for No Pharmacological VTE Prophylaxis  Once     Question:  Reasons:  Answer:  Physician Provided (leave comment)    01/31/20 0317     IP VTE HIGH RISK PATIENT  Once      01/31/20 0317                   Lavell Waldrop MD  Department of Hospital Medicine   [facility]

## 2020-01-31 NOTE — PROGRESS NOTES
Blood initiated. No distress noted. Spoke with pharmacy about givning zyvoxx over slower rate due to pt chf. Instructed can give at 80cc/hr.

## 2020-01-31 NOTE — PLAN OF CARE
CM spoke to patient who is awake, alert, and able to make needs known. Patient reports living at home with his spouse that helps manage his care. Patient reports that he uses a wheelchair at home but does not have any other equipment needs. Patient reports that he is established with a PCP and he is still able to drive hisself to all appointments. Patient reports that at this time he does not feel like when he discharges he will need any services at home. Patient reports that he is feeling better at this time but not sure if he will be able to discharge on today or tomm. CM provided a transitional care folder, information on advanced directives, information on pharmacy bedside delivery, and discharge planning begins on admission with contact information for any needs/questions.    D/C Plan: Home   PCP: Dr. Nuñez   Preferred Pharmacy: [facility]   Discharge transportation: Family   My [facility]:  Pharmacy Bedside Delivery:        [facility] - [place], [place] - [address]  [address] [address]  Phone: [phone] Fax: [phone]    [id number] - [place], [place] - [address] [facility] [address]  [address]  [facility] [address]  Phone: [phone] Fax: [phone]       01/31/20 0971   Discharge Assessment   Assessment Type Discharge Planning Assessment   Confirmed/corrected address and phone number on facesheet? Yes   Assessment information obtained from? Patient   Expected Length of Stay (days)   (tbd )   Communicated expected length of stay with patient/caregiver yes   Prior to hospitilization cognitive status: Alert/Oriented   Prior to hospitalization functional status: Assistive Equipment   Current cognitive status: Alert/Oriented   Current Functional Status: Assistive Equipment   Facility Arrived From: home    Lives With spouse   Able to Return to Prior Arrangements yes   Is patient able to care for self after  discharge? Yes   Who are your caregiver(s) and their phone number(s)? Laury ( spouse) [phone]   Patient's perception of discharge disposition home or selfcare   Patient currently being followed by outpatient case management? No   Patient currently receives any other outside agency services? No   Equipment Currently Used at Home wheelchair   Do you have any problems affording any of your prescribed medications? No   Is the patient taking medications as prescribed? yes   Does the patient have transportation home? Yes   Transportation Anticipated family or friend will provide   Does the patient receive services at the [facility]? No   Discharge Plan A Home with family   DME Needed Upon Discharge  none   Patient/Family in Agreement with Plan yes

## 2020-01-31 NOTE — PROGRESS NOTES
Pt admitted to floor. Alert and oriented x 3. Cooperative with care. Denies chest pain. Lungs diminished with fine crackles encouraged dbc 10 x q1h wa. o2 at 2l nc. Sats=96%. Abdomen soft. States bm today. Brown in color. Denies n/v. Pt with blanchable redness to coccyx. Instructed on turning q2h. Pt with sore to lt foot. Ss drainage noted. Culture taken in er. Pt with small abrasion to rt stomp after pt removed protective dressing at home. Oriented to room and call light. Monitor on. SR. Iv x 2 to lt arm. Urinal at bedside. bsc in room.

## 2020-01-31 NOTE — TELEPHONE ENCOUNTER
Contacted Dr. Johnson regarding neph tube placement consult.  He was concerned about the patient being on Plavix and Asprin.  Notified Dr. Patterson, the consulting md. And gave Deanne Johnson's contact info via secure chat.

## 2020-01-31 NOTE — PLAN OF CARE
Patient AAO x4. VSS..   Patient remained afebrile throughout the shift.  Patient remained free of falls this shift.  Plan of care reviewed.  Patient verbalized understanding.  Patient up with assist   Frequent weight shifting encouraged.  Patient NSR on monitor.  Bed low, siderails up x2, wheels locked, call light in reach.  Bed alarm maintained for safety.  Patient instructed to call for assistance.  Hourly rounding completed.  1 unit of blood finished infusing   Sterile water with sodium bicarb infusing  Will continue to monitor.

## 2020-01-31 NOTE — NURSING
Interventional Radiology:  Called IR Call MD Israel Leach to notify of consult for Neph tube.  He instructed me to call Dr. Johnson at  - I called Dr. Johnson cell and left a voicemail

## 2020-01-31 NOTE — ASSESSMENT & PLAN NOTE
Patient's baseline creatinine is about 2.0 with is about an function of 20-25%.  Patient's creatinine is low due to poor muscle mass and a BKA.  Check cystatin C for accurate GFR once acute kidney function is better.  Acute kidney injury is complicated with low hemoglobin poor cardiac output and low renal perfusion.  Also patient has a history of horseshoe kidney.  Patient had a stent placed in the left kidney in the past.  Patient has a previous ultrasound from February 2019 showing no renal artery stenosis.  Ultrasound today shows severe hydronephrosis on the left side which will need urgent urological intervention/consultation.  Case discussed with Dr. Patterson for possible transferring the patient to Urology service as soon as possible.  At this point I would agree with gentle hydration and packed RBCs.  Patient's fraction excretion of sodium is greater than 1% and patient's BNP is higher than baseline.  I would continue with says close surveillance to follow with shortness of breath and worsening of congestive heart failure with blood transfusion and would have a low threshold to start IV diuretics and stop the IV fluids.

## 2020-01-31 NOTE — HPI
69 year old man with extensive past medical and surgery history of    stroke, spinal cord disease, peripheral vascular disease, MI (per patient, 2000 & 2012), lipoma of colon, infection of aortic graft, HTN, HLD, hyperglycemia, horseshoe kidney, hemorrhoids, GERD. He was recently discharged from the Hospital on  1/15/2020. He presented at this time with chest pain that started few hours prior to presentation.Pain was described a sharp ,non radiating ,not associated with diaphoresis and worsened with exertion. He called his wife and EMS was called.   Since admission, Lab data showed   Component      Latest Ref Rng & Units 1/31/2020 1/30/2020 1/15/2020               Hemoglobin      14.0 - 18.0 g/dL 6.9 (L) 7.0 (L) 7.7 (L)     Component      Latest Ref Rng & Units 1/31/2020 1/30/2020 1/15/2020               Creatinine      0.5 - 1.4 mg/dL 6.6 (H) 6.0 (H) 2.4 (H)   He also reported increased pain from the left foot ulceration and when the dressing was opened-sánchez pus was seen .  He will be admitted for symptomatic anemia and acute renal failure .

## 2020-01-31 NOTE — PLAN OF CARE
Dx anemia  Poc instructed on dbc 10 x q1h wsa. Instructed on turning q2h. Instructed on ivab, and blood tranfusion. O2 in place. Mild sob noted. Pt SR on monitor. Nephro and podiatry consult. Instructed on fall risk and precautions. Wound to lt stump with ss drainage. Cleansed with saline and covered with mepilex.

## 2020-01-31 NOTE — ASSESSMENT & PLAN NOTE
Will do renal ultrasound,start gentle hydration, nephrology consult, check renal function in AM

## 2020-01-31 NOTE — ASSESSMENT & PLAN NOTE
Will do wound culture , podiatry consult -will need to rule out osteomyelitis , will do bone scan in AM   WILL start Rocephin/zyvox

## 2020-01-31 NOTE — HPI
Patient is a 70-year-old male with history of hypertension and chronic kidney disease stage 3/stage IV.  Patient's baseline creatinine has fluctuated in the past with multiple episodes of acute kidney injury last time was seen in the hospital was in April of 2019 with a creatinine went up to 4.8 and at times higher.  Since then patient has been having fluctuations in creatinine up to 2.0.  Patient was recently discharged from the hospital.  Patient comes back with shortness of breath.  Was found to have acute kidney injury on chronic kidney disease with a creatinine up to 6.0.  Patient also found to have a hemoglobin drop down to 7.0 and is receiving 1 unit of blood transfusion with gentle hydration.  Patient also has shortness of breath and congestive heart failure.

## 2020-01-31 NOTE — SUBJECTIVE & OBJECTIVE
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    Late complications of amputation stump     rseolved with further amputation( MRSA then none since 2014)    Lipoma of colon     colonoscopy 2/21/2014    Myocardial infarction     per patient 2000 & 9/2012    Peripheral vascular disease     Phantom limb syndrome     patient reports only intermittent not problematic, not worsening    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    Spinal cord disease     L4L5 disc    Stroke     Tobacco dependence     resolved    Ureteral stent retained        Past Surgical History:   Procedure Laterality Date    ABDOMINAL AORTIC ANEURYSM REPAIR      ABDOMINAL AORTIC ANEURYSM REPAIR  1996/2014    AMPUTATION, LOWER LIMB      AORTA - BILATERAL FEMORAL ARTERY BYPASS GRAFT  2014    Left and right leg    CORONARY ANGIOPLASTY WITH STENT PLACEMENT  2000    Three placed in heart    CYSTOSCOPY W/ RETROGRADES Left 5/29/2018    Procedure: CYSTOSCOPY, WITH RETROGRADE PYELOGRAM;  Surgeon: Scooter Jin IV, MD;  Location: [facility] OR;  Service: Urology;  Laterality: Left;    CYSTOSCOPY W/ URETERAL STENT PLACEMENT Left 5/29/2018    Procedure: CYSTOSCOPY, WITH URETERAL STENT INSERTION;  Surgeon: Scooter Jin IV, MD;  Location: [facility] OR;  Service: Urology;  Laterality: Left;    CYSTOSCOPY W/ URETERAL STENT REMOVAL Left 5/29/2018    Procedure: CYSTOSCOPY, WITH URETERAL STENT  REMOVAL;  Surgeon: Scooter Jin IV, MD;  Location: [facility] OR;  Service: Urology;  Laterality: Left;    FOOT AMPUTATION THROUGH METATARSAL  1996    left    FOOT SURGERY Bilateral 1980's    per patient multiple toe amputations prior to.  partial foot amputation:first great toe then other toes     KIDNEY SURGERY  2014    per patient separation of horseshoe kidney @ time of AAA repair    LEFT HEART CATHETERIZATION Left 3/7/2019    Procedure: CATHETERIZATION, HEART, LEFT;  Surgeon: Adriel Boone MD;  Location: [facility] CATH LAB;  Service: Cardiology;  Laterality: Left;  630 admit for IV hydration  10am start    LUNG LOBECTOMY Right 1970s    per patient not cancer    right below knee amputation  2009 (approx)    SMALL INTESTINE SURGERY  2014    per patient partial @ time of aaa repair  not small bowel - large bowel bowel compromised bythtwe AAAbowel    TONSILLECTOMY  1955 aprox    URETERAL STENT PLACEMENT Left     annually replaced since 2012 or so  Dr Jin       Review of patient's allergies indicates:   Allergen Reactions    Morphine Itching     Current Facility-Administered Medications   Medication Frequency    0.9%  NaCl infusion (for blood administration) Q24H PRN    amLODIPine tablet 10 mg Daily    aspirin EC tablet 81 mg Daily    carvedilol tablet 25 mg BID WM    cefTRIAXone (ROCEPHIN) 2 g in dextrose 5 % 50 mL IVPB Q24H    clopidogrel tablet 75 mg Daily    dextrose 10% (D10W) Bolus PRN    dextrose 10% (D10W) Bolus PRN    glucagon (human recombinant) injection 1 mg PRN    glucose chewable tablet 16 g PRN    glucose chewable tablet 24 g PRN    linezolid 600mg/300ml 600 mg/300 mL IVPB 600 mg Q12H    losartan tablet 100 mg Daily    nitroGLYCERIN SL tablet 0.4 mg Q5 Min PRN    pantoprazole EC tablet 40 mg Daily    sodium chloride 0.9% flush 10 mL PRN    sterile water 1,000 mL with sodium bicarbonate 1 mEq/mL (8.4 %) 150 mEq infusion Continuous     Family History     Problem Relation (Age  of Onset)    COPD Mother    Cancer Mother    Diabetes Daughter    Heart disease Father        Tobacco Use    Smoking status: Former Smoker     Packs/day: 1.00     Years: 15.00     Pack years: 15.00     Last attempt to quit: 2009     Years since quittin.0    Smokeless tobacco: Never Used   Substance and Sexual Activity    Alcohol use: No    Drug use: No     Comment: Is on prescription opiod, no non prescribed use    Sexual activity: Not Currently     Partners: Female     Review of Systems   Constitutional: Positive for activity change, appetite change, diaphoresis, fatigue and unexpected weight change. Negative for chills and fever.   HENT: Negative.  Negative for congestion and trouble swallowing.    Eyes: Negative.    Respiratory: Positive for cough, choking, chest tightness, shortness of breath and wheezing.    Cardiovascular: Negative.  Negative for chest pain, palpitations and leg swelling.   Gastrointestinal: Negative.  Negative for abdominal distention, abdominal pain, nausea and vomiting.   Genitourinary: Negative.  Negative for decreased urine volume, difficulty urinating, dysuria, enuresis, flank pain, frequency, hematuria, penile swelling, scrotal swelling and urgency.   Musculoskeletal: Negative.  Negative for arthralgias, back pain, joint swelling and neck pain.   Skin: Negative for rash.   Neurological: Negative.  Negative for tremors, seizures and headaches.   Psychiatric/Behavioral: Negative.  Negative for confusion and sleep disturbance. The patient is not nervous/anxious.    All other systems reviewed and are negative.    Objective:     Vital Signs (Most Recent):  Temp: 97.6 °F (36.4 °C) (20)  Pulse: 63 (20)  Resp: 18 (20)  BP: (!) 119/57 (20)  SpO2: 98 % (20)  O2 Device (Oxygen Therapy): nasal cannula (20 0550) Vital Signs (24h Range):  Temp:  [97.6 °F (36.4 °C)-98.1 °F (36.7 °C)] 97.6 °F (36.4 °C)  Pulse:  [63-76] 63  Resp:   [18-23] 18  SpO2:  [93 %-98 %] 98 %  BP: ()/(49-70) 119/57     Weight: 80.9 kg (178 lb 5.6 oz) (01/31/20 0348)  Body mass index is 23.53 kg/m².  Body surface area is 2.04 meters squared.    No intake/output data recorded.    Physical Exam   Constitutional: He is oriented to person, place, and time. He appears well-developed and well-nourished.   HENT:   Head: Normocephalic and atraumatic.   Nose: Nose normal.   Eyes: EOM are normal.   PERRL   Neck: Normal range of motion. Neck supple.   Cardiovascular: Normal rate, regular rhythm and normal heart sounds.   Pulmonary/Chest: Effort normal. No respiratory distress. He has wheezes. He has rales.   Abdominal: Soft. Bowel sounds are normal. He exhibits no distension.   Musculoskeletal: Normal range of motion. He exhibits deformity. He exhibits no edema.   Right BKA   Neurological: He is alert and oriented to person, place, and time. He displays abnormal reflex. A sensory deficit is present. He exhibits abnormal muscle tone. Coordination abnormal.   Skin: Skin is dry. Capillary refill takes 2 to 3 seconds.   Nursing note and vitals reviewed.      Significant Labs:  All labs within the past 24 hours have been reviewed.    Significant Imaging:  Labs: Reviewed  X-Ray: Reviewed  US: Reviewed  Echo: Reviewed

## 2020-01-31 NOTE — CONSULTS
[facility] -   Podiatry  Consult Note    Patient Name: Kodi Ribeiro  MRN: [id number]  Admission Date: 1/31/2020  Hospital Length of Stay: 0 days  Attending Physician: Davonte Patterson MD  Primary Care Provider: Norma Nuñez MD     Consults  Subjective:     History of Present Illness:  70-year-old male admitted for acute on chronic anemia and to his pain.  He has a past medical history stroke, peripheral vascular disease him a hypertension, hyperlipidemia, congestive heart failure, coronary arterial disease with stenting, history of AAA with resection and bypass,  right below-knee amputation in early 2000s, midfoot amputation on the left in early 1990s. Currently has an ulceration on the plantar left foot.  States that he has not notices increased in redness, swelling on streaking. Reports to getting new diabetic shoes recently that put him in a slight plantarflexed position and puts pressure on his amputation site.     Scheduled Meds:   amLODIPine  10 mg Oral Daily    aspirin  81 mg Oral Daily    carvedilol  25 mg Oral BID WM    cefTRIAXone (ROCEPHIN) IVPB  2 g Intravenous Q24H    clopidogrel  75 mg Oral Daily    linezolid 600mg/300ml  600 mg Intravenous Q12H    losartan  100 mg Oral Daily    pantoprazole  40 mg Oral Daily     Continuous Infusions:   sodium chloride 0.9% 75 mL/hr at 01/31/20 0409     PRN Meds:sodium chloride, Dextrose 10% Bolus, Dextrose 10% Bolus, glucagon (human recombinant), glucose, glucose, nitroglycerin, sodium chloride 0.9%    Review of patient's allergies indicates:   Allergen Reactions    Morphine Itching        Past Medical History:   Diagnosis Date    Acute on chronic congestive heart failure 1/13/2020    Acute respiratory failure with hypoxia 1/14/2020    Analgesic nephropathy     Anemia     AP (angina pectoris) 1/11/2019    Arthritis     Colon polyp     Repeat colonoscopy due in 9/14    Coronary artery disease     Diverticulosis     colonoscopy 2/21/2014     Encounter for blood transfusion     GERD (gastroesophageal reflux disease)     Hemorrhoids     colonoscopy 2/21/2014    Horseshoe kidney     Hyperglycemia 3/17/2014    Hyperlipidemia     Hypertension     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    Late complications of amputation stump     rseolved with further amputation( MRSA then none since 2014)    Lipoma of colon     colonoscopy 2/21/2014    Myocardial infarction     per patient 2000 & 9/2012    Peripheral vascular disease     Phantom limb syndrome     patient reports only intermittent not problematic, not worsening    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    Spinal cord disease     L4L5 disc    Stroke     Tobacco dependence     resolved    Ureteral stent retained      Past Surgical History:   Procedure Laterality Date    ABDOMINAL AORTIC ANEURYSM REPAIR      ABDOMINAL AORTIC ANEURYSM REPAIR  1996/2014    AMPUTATION, LOWER LIMB      AORTA - BILATERAL FEMORAL ARTERY BYPASS GRAFT  2014    Left and right leg    CORONARY ANGIOPLASTY WITH STENT PLACEMENT  2000    Three placed in heart    CYSTOSCOPY W/ RETROGRADES Left 5/29/2018    Procedure: CYSTOSCOPY, WITH RETROGRADE PYELOGRAM;  Surgeon: Scooter Jin IV, MD;  Location: [facility] OR;  Service: Urology;  Laterality: Left;    CYSTOSCOPY W/ URETERAL STENT PLACEMENT Left 5/29/2018    Procedure: CYSTOSCOPY, WITH URETERAL STENT INSERTION;  Surgeon: Scooter Jin IV, MD;  Location: [facility] OR;  Service: Urology;  Laterality: Left;    CYSTOSCOPY W/ URETERAL STENT REMOVAL Left 5/29/2018    Procedure: CYSTOSCOPY, WITH URETERAL STENT REMOVAL;  Surgeon: Scooter Jin IV, MD;  Location: [facility] OR;  Service: Urology;  Laterality: Left;    FOOT AMPUTATION THROUGH METATARSAL  1996    left    FOOT SURGERY Bilateral 1980's    per patient multiple toe amputations prior to.  partial foot amputation:first great toe then other toes     KIDNEY SURGERY  2014    per patient separation of horseshoe kidney @  time of AAA repair    LEFT HEART CATHETERIZATION Left 3/7/2019    Procedure: CATHETERIZATION, HEART, LEFT;  Surgeon: Adriel Boone MD;  Location: [facility] CATH LAB;  Service: Cardiology;  Laterality: Left;  630 admit for IV hydration  10am start    LUNG LOBECTOMY Right 1970s    per patient not cancer    right below knee amputation   (approx)    SMALL INTESTINE SURGERY  2014    per patient partial @ time of aaa repair  not small bowel - large bowel bowel compromised bythtwe AAAbowel    TONSILLECTOMY   aprox    URETERAL STENT PLACEMENT Left     annually replaced since  or so  Dr Jin       Family History     Problem Relation (Age of Onset)    COPD Mother    Cancer Mother    Diabetes Daughter    Heart disease Father        Tobacco Use    Smoking status: Former Smoker     Packs/day: 1.00     Years: 15.00     Pack years: 15.00     Last attempt to quit: 2009     Years since quittin.0    Smokeless tobacco: Never Used   Substance and Sexual Activity    Alcohol use: No    Drug use: No     Comment: Is on prescription opiod, no non prescribed use    Sexual activity: Not Currently     Partners: Female     Review of Systems   Constitutional: Negative for activity change, appetite change, chills and fever.   HENT: Negative for sinus pain, sore throat and voice change.    Eyes: Negative for pain, redness and visual disturbance.   Respiratory: Positive for shortness of breath (on oxygen). Negative for cough and chest tightness.    Cardiovascular: Negative for chest pain, palpitations and leg swelling.   Gastrointestinal: Negative for abdominal pain, diarrhea, nausea and vomiting.   Genitourinary: Negative for difficulty urinating.   Musculoskeletal: Positive for gait problem. Negative for back pain and joint swelling.   Skin: Negative for color change and wound.   Neurological: Negative for dizziness, weakness and numbness.   Psychiatric/Behavioral: The patient is not nervous/anxious.       Objective:     Vital Signs (Most Recent):  Temp: 97.8 °F (36.6 °C) (01/31/20 0605)  Pulse: 65 (01/31/20 0605)  Resp: 20 (01/31/20 0605)  BP: (!) 122/59 (01/31/20 0605)  SpO2: 97 % (01/31/20 0605) Vital Signs (24h Range):  Temp:  [97.6 °F (36.4 °C)-98.1 °F (36.7 °C)] 97.8 °F (36.6 °C)  Pulse:  [63-76] 65  Resp:  [19-23] 20  SpO2:  [93 %-98 %] 97 %  BP: ()/(49-70) 122/59     Weight: 80.9 kg (178 lb 5.6 oz)  Body mass index is 23.53 kg/m².    Foot Exam   Vascular:  DP pulse on the left is 0/4.  PT pulse on the left is 1/4.  Capillary refill is intact less than 3 sec.  The foot is warm to warm from proximal distal.  There is no pedal hair growth to the dorsal aspect of the foot. There is no pretibial edema or lower leg swelling.    Neurological:  Protective sensation is not intact to plantar left surface of the foot digits.  Patient has no sensation with 5.07 g [place] monofilament.  Sensation light touch is intact. Proprioception the ankle joint is intact. Sensation to pinprick reduced absent.     Orthopedic:  Below-knee amputation right.  Left proximal transmetatarsal amputation.  No pain on palpation of the left foot or with medial to lateral compression of the ulceration.  Equinus deformity is noted.    Dermatology:  There is an ulceration measuring 0.8 cm x 0.6cm to the lateral aspect of the plantar left foot. The ulceration does not show visible osseous structures. The wound does probe near bone, however it appears the cortex is intact. There is no drainage or purulence expressed from the ulceration. The wound base is mixed fibrogranular. There is no evidence of erythema, increased edema, lymphangitis or proximal streaking. No malodor.     Laboratory:  Blood Cultures: No results for input(s): LABBLOO in the last 48 hours.  CBC:   Recent Labs   Lab 01/31/20  0441   WBC 4.51   RBC 2.50*   HGB 6.7*   HCT 22.8*      MCV 91   MCH 26.8*   MCHC 29.4*     CMP:   Recent Labs   Lab 01/31/20  0034  01/31/20 0441    109   CALCIUM 7.6* 7.6*   ALBUMIN 3.0*  --    PROT 7.1  --    * 133*   K 5.1 5.4*   CO2 11* 12*    110   BUN 61* 62*   CREATININE 6.6* 6.6*   ALKPHOS 131  --    ALT <5*  --    AST 6*  --    BILITOT 0.3  --      CRP:   Recent Labs   Lab 01/31/20 0441   .6*     ESR:   Recent Labs   Lab 01/31/20 0441   SEDRATE 80*     Wound Cultures: No results for input(s): LABAERO in the last 4320 hours.  Microbiology Results (last 7 days)     Procedure Component Value Units Date/Time    Aerobic culture [[id number]] Collected:  01/31/20 0324    Order Status:  Sent Specimen:  Abscess from Foot, Left Updated:  01/31/20 0342    Culture, Anaerobe [[id number]] Collected:  01/31/20 0324    Order Status:  Sent Specimen:  Abscess from Foot, Left Updated:  01/31/20 0342        No results for input(s): COLORU, CLARITYU, SPECGRAV, PHUR, PROTEINUA, GLUCOSEU, BILIRUBINCON, BLOODU, WBCU, RBCU, BACTERIA, MUCUS, NITRITE, LEUKOCYTESUR, UROBILINOGEN, HYALINECASTS in the last 168 hours.    Diagnostic Results:  I have reviewed all pertinent imaging results/findings within the past 24 hours.    Clinical Findings:  There is an ulceration present to the plantar aspect of the left foot.  There is no proximal streaking, purulence on my exam, increased erythema or swelling. And reviewing the sed rate and CRP, there could be chronic osteomyelitis present to the residual metatarsals vs. Underlying abscess. With a normal/stable WBC, I would expect there is likely chronic infection rather than underlying abscess.    We did discuss treatment options regarding possible underlying osteomyelitis whether that is antibiotics depending on his kidney function versus further resection.  Discussed with patient that based on his current blood flow status with decreased palpable pulses in left lower extremity, recommend getting arterial duplex to determine if adequate blood flow is getting to the foot ulceration.    Bone scan was  ordered by Infectious Disease this morning as patient has claustrophobia and elevated creatinine with CKD stage 3.  Will continue to monitor and await for the results of this scan.     Will continue to follow cultures taken of wound      Assessment/Plan:     CKD (chronic kidney disease) stage 3, GFR 30-59 ml/min  Co-morbide state being managed by primary.  Antibiotics per ID      PVD (peripheral vascular disease)  Recommend up to date arterial duplex to determine healing potential of the ulceration to the plantar left foot with un-palpable pulse to the DP and vascular calcifications on xray.     May need consult to Vascular surgery based on the results with history of Left SFA stent and history of multilevel disased      Foot ulcer - Left Foot  Continue to follow cultures taken from ED on left foot ulceration  Bone scan scheduled per ID. Await results  Recommend daily dressing changes with betadine to gauze, Dry sterile dressing.  Continue to follow AM labs        Thank you for your consult. I will follow-up with patient. Please contact us if you have any additional questions.    Shelby Vega DPM  Podiatry  [facility] [place]

## 2020-01-31 NOTE — ASSESSMENT & PLAN NOTE
Recommend up to date arterial duplex to determine healing potential of the ulceration to the plantar left foot with un-palpable pulse to the DP and vascular calcifications on xray.     May need consult to Vascular surgery based on the results with history of Left SFA stent and history of multilevel disased

## 2020-01-31 NOTE — HPI
70-year-old male admitted for acute on chronic anemia and to his pain.  He has a past medical history stroke, peripheral vascular disease him a hypertension, hyperlipidemia, congestive heart failure, coronary arterial disease with stenting, history of AAA with resection and bypass,  right below-knee amputation in early 2000s, midfoot amputation on the left in early 1990s. Currently has an ulceration on the plantar left foot.  States that he has not notices increased in redness, swelling on streaking. Reports to getting new diabetic shoes recently that put him in a slight plantarflexed position and puts pressure on his amputation site.

## 2020-01-31 NOTE — SUBJECTIVE & OBJECTIVE
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    Late complications of amputation stump     rseolved with further amputation( MRSA then none since 2014)    Lipoma of colon     colonoscopy 2/21/2014    Myocardial infarction     per patient 2000 & 9/2012    Peripheral vascular disease     Phantom limb syndrome     patient reports only intermittent not problematic, not worsening    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    Spinal cord disease     L4L5 disc    Stroke     Tobacco dependence     resolved    Ureteral stent retained        Past Surgical History:   Procedure Laterality Date    ABDOMINAL AORTIC ANEURYSM REPAIR      ABDOMINAL AORTIC ANEURYSM REPAIR  1996/2014    AMPUTATION, LOWER LIMB      AORTA - BILATERAL FEMORAL ARTERY BYPASS GRAFT  2014    Left and right leg    CORONARY ANGIOPLASTY WITH STENT PLACEMENT  2000    Three placed in heart    CYSTOSCOPY W/ RETROGRADES Left 5/29/2018    Procedure: CYSTOSCOPY, WITH RETROGRADE PYELOGRAM;  Surgeon: Scooter Jin IV, MD;  Location: [facility] OR;  Service: Urology;  Laterality: Left;    CYSTOSCOPY W/ URETERAL STENT PLACEMENT Left 5/29/2018    Procedure: CYSTOSCOPY, WITH URETERAL STENT INSERTION;  Surgeon: Scooter Jin IV, MD;  Location: [facility] OR;  Service: Urology;  Laterality: Left;    CYSTOSCOPY W/ URETERAL STENT REMOVAL Left 5/29/2018    Procedure: CYSTOSCOPY, WITH URETERAL STENT  REMOVAL;  Surgeon: Scooter Jin IV, MD;  Location: [facility] OR;  Service: Urology;  Laterality: Left;    FOOT AMPUTATION THROUGH METATARSAL  1996    left    FOOT SURGERY Bilateral 1980's    per patient multiple toe amputations prior to.  partial foot amputation:first great toe then other toes     KIDNEY SURGERY  2014    per patient separation of horseshoe kidney @ time of AAA repair    LEFT HEART CATHETERIZATION Left 3/7/2019    Procedure: CATHETERIZATION, HEART, LEFT;  Surgeon: Adriel Boone MD;  Location: [facility] CATH LAB;  Service: Cardiology;  Laterality: Left;  630 admit for IV hydration  10am start    LUNG LOBECTOMY Right 1970s    per patient not cancer    right below knee amputation  2009 (approx)    SMALL INTESTINE SURGERY  2014    per patient partial @ time of aaa repair  not small bowel - large bowel bowel compromised bythtwe AAAbowel    TONSILLECTOMY  1955 aprox    URETERAL STENT PLACEMENT Left     annually replaced since 2012 or so  Dr Jin       Review of patient's allergies indicates:   Allergen Reactions    Morphine Itching       No current facility-administered medications on file prior to encounter.      Current Outpatient Medications on File Prior to Encounter   Medication Sig    amLODIPine (NORVASC) 10 MG tablet TAKE 1 TABLET EVERY DAY    aspirin (ECOTRIN) 81 MG EC tablet Take 1 tablet (81 mg total) by mouth once daily.    carvedilol (COREG) 25 MG tablet Take 1 tablet (25 mg total) by mouth 2 (two) times daily with meals.    clopidogrel (PLAVIX) 75 mg tablet TAKE 1 TABLET EVERY DAY    furosemide (LASIX) 20 MG tablet Take 1 tablet (20 mg total) by mouth once daily.    isosorbide mononitrate (IMDUR) 60 MG 24 hr tablet Take 2 tablets (120 mg total) by mouth once daily.    losartan (COZAAR) 100 MG tablet Take 1 tablet (100 mg total) by mouth once daily.    mupirocin (BACTROBAN) 2 % ointment Apply topically 3 (three) times daily.    nitroglycerin (NITROSTAT) 0.6 MG Subl Place 1  tablet (0.6 mg total) under the tongue every 5 (five) minutes as needed (max 3/ per episode).    omeprazole (PRILOSEC) 20 MG capsule TAKE 1 CAPSULE TWICE DAILY    cetirizine (ZYRTEC) 10 MG tablet Take 10 mg by mouth once daily.    [DISCONTINUED] oxyCODONE-acetaminophen (PERCOCET) 5-325 mg per tablet Take 1 tablet by mouth every 6 (six) hours as needed for Pain.     Family History     Problem Relation (Age of Onset)    COPD Mother    Cancer Mother    Diabetes Daughter    Heart disease Father        Tobacco Use    Smoking status: Former Smoker     Packs/day: 1.00     Years: 15.00     Pack years: 15.00     Last attempt to quit: 2009     Years since quittin.0    Smokeless tobacco: Never Used   Substance and Sexual Activity    Alcohol use: No    Drug use: No     Comment: Is on prescription opiod, no non prescribed use    Sexual activity: Not Currently     Partners: Female     Review of Systems   Constitutional: Negative for activity change, appetite change, chills, diaphoresis and fatigue.   HENT: Negative for congestion, dental problem, ear discharge, ear pain and facial swelling.    Eyes: Negative for pain, discharge and itching.   Respiratory: Positive for shortness of breath. Negative for apnea, cough and chest tightness.    Cardiovascular: Positive for chest pain. Negative for leg swelling.   Gastrointestinal: Negative for abdominal distention and abdominal pain.   Endocrine: Negative for cold intolerance, heat intolerance and polydipsia.   Genitourinary: Negative for difficulty urinating, dysuria and enuresis.   Musculoskeletal: Negative for arthralgias and back pain.   Skin: Positive for wound. Negative for color change and pallor.   Allergic/Immunologic: Negative for environmental allergies and food allergies.   Neurological: Negative for dizziness, facial asymmetry, light-headedness and headaches.   Hematological: Negative for adenopathy. Does not bruise/bleed easily.   Psychiatric/Behavioral:  Negative for agitation and behavioral problems.     Objective:     Vital Signs (Most Recent):  Temp: 97.9 °F (36.6 °C) (01/31/20 0004)  Pulse: 68 (01/31/20 0302)  Resp: (!) 23 (01/31/20 0302)  BP: 119/67 (01/31/20 0302)  SpO2: (!) 94 % (01/31/20 0302) Vital Signs (24h Range):  Temp:  [97.9 °F (36.6 °C)] 97.9 °F (36.6 °C)  Pulse:  [63-76] 68  Resp:  [19-23] 23  SpO2:  [93 %-97 %] 94 %  BP: ()/(49-67) 119/67        Body mass index is 23.85 kg/m².    Physical Exam   Constitutional: He is oriented to person, place, and time. He appears well-developed and well-nourished.   HENT:   Head: Normocephalic and atraumatic.   Nose: Nose normal.   Eyes: EOM are normal.   PERRL   Neck: Normal range of motion. Neck supple.   Cardiovascular: Normal rate, regular rhythm and normal heart sounds.   Pulmonary/Chest: Effort normal and breath sounds normal. No respiratory distress. He has no wheezes. He has no rales.   Abdominal: Soft. Bowel sounds are normal. He exhibits no distension.   Musculoskeletal: Normal range of motion. He exhibits no edema.   PLEASE SEE PIC, RIGHT bka   Neurological: He is alert and oriented to person, place, and time.   Skin: Skin is dry.   Nursing note and vitals reviewed.        CRANIAL NERVES     CN III, IV, VI   Extraocular motions are normal.            Significant Labs:   BMP:   Recent Labs   Lab 01/31/20  0034      *   K 5.1      CO2 11*   BUN 61*   CREATININE 6.6*   CALCIUM 7.6*     CBC:   Recent Labs   Lab 01/30/20  1453 01/31/20  0034   WBC 5.34 6.36   HGB 7.0* 6.9*   HCT 24.2* 22.8*    212     All pertinent labs within the past 24 hours have been reviewed.    Significant Imaging: I have reviewed and interpreted all pertinent imaging results/findings within the past 24 hours.

## 2020-01-31 NOTE — CONSULTS
[facility] -   Nephrology  Consult Note        Patient Name: Kodi Ribeiro  MRN: [id number]  Admission Date: 1/31/2020  Hospital Length of Stay: 0 days  Attending Provider: Davonte Patterson MD   Primary Care Physician: Norma Nuñez MD  Principal Problem:<principal problem not specified>    Consults  Subjective:     HPI: Patient is a 70-year-old male with history of hypertension and chronic kidney disease stage 3/stage IV.  Patient's baseline creatinine has fluctuated in the past with multiple episodes of acute kidney injury last time was seen in the hospital was in April of 2019 with a creatinine went up to 4.8 and at times higher.  Since then patient has been having fluctuations in creatinine up to 2.0.  Patient was recently discharged from the hospital.  Patient comes back with shortness of breath.  Was found to have acute kidney injury on chronic kidney disease with a creatinine up to 6.0.  Patient also found to have a hemoglobin drop down to 7.0 and is receiving 1 unit of blood transfusion with gentle hydration.  Patient also has shortness of breath and congestive heart failure.    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    Late complications of amputation stump     rseolved with further amputation( MRSA then none since 2014)    Lipoma of colon     colonoscopy 2/21/2014    Myocardial infarction     per patient 2000 & 9/2012    Peripheral  vascular disease     Phantom limb syndrome     patient reports only intermittent not problematic, not worsening    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    Spinal cord disease     L4L5 disc    Stroke     Tobacco dependence     resolved    Ureteral stent retained        Past Surgical History:   Procedure Laterality Date    ABDOMINAL AORTIC ANEURYSM REPAIR      ABDOMINAL AORTIC ANEURYSM REPAIR  1996/2014    AMPUTATION, LOWER LIMB      AORTA - BILATERAL FEMORAL ARTERY BYPASS GRAFT  2014    Left and right leg    CORONARY ANGIOPLASTY WITH STENT PLACEMENT  2000    Three placed in heart    CYSTOSCOPY W/ RETROGRADES Left 5/29/2018    Procedure: CYSTOSCOPY, WITH RETROGRADE PYELOGRAM;  Surgeon: Scooter Jin IV, MD;  Location: [facility] OR;  Service: Urology;  Laterality: Left;    CYSTOSCOPY W/ URETERAL STENT PLACEMENT Left 5/29/2018    Procedure: CYSTOSCOPY, WITH URETERAL STENT INSERTION;  Surgeon: Scooter Jin IV, MD;  Location: [facility] OR;  Service: Urology;  Laterality: Left;    CYSTOSCOPY W/ URETERAL STENT REMOVAL Left 5/29/2018    Procedure: CYSTOSCOPY, WITH URETERAL STENT REMOVAL;  Surgeon: Scooter Jin IV, MD;  Location: [facility] OR;  Service: Urology;  Laterality: Left;    FOOT AMPUTATION THROUGH METATARSAL  1996    left    FOOT SURGERY Bilateral 1980's    per patient multiple toe amputations prior to.  partial foot amputation:first great toe then other toes     KIDNEY SURGERY  2014    per patient separation of horseshoe kidney @ time of AAA repair    LEFT HEART CATHETERIZATION Left 3/7/2019    Procedure: CATHETERIZATION, HEART, LEFT;  Surgeon: Adriel Boone MD;  Location: [facility] CATH LAB;  Service: Cardiology;  Laterality: Left;  630 admit for IV hydration  10am start    LUNG LOBECTOMY Right 1970s    per patient not cancer    right below knee amputation  2009 (approx)    SMALL INTESTINE SURGERY  2014    per patient partial @ time of aaa repair  not small bowel - large bowel bowel  [facility]    TONSILLECTOMY   aprox    URETERAL STENT PLACEMENT Left     annually replaced since 2012 or so  Dr Jin       Review of patient's allergies indicates:   Allergen Reactions    Morphine Itching     Current Facility-Administered Medications   Medication Frequency    0.9%  NaCl infusion (for blood administration) Q24H PRN    amLODIPine tablet 10 mg Daily    aspirin EC tablet 81 mg Daily    carvedilol tablet 25 mg BID WM    cefTRIAXone (ROCEPHIN) 2 g in dextrose 5 % 50 mL IVPB Q24H    clopidogrel tablet 75 mg Daily    dextrose 10% (D10W) Bolus PRN    dextrose 10% (D10W) Bolus PRN    glucagon (human recombinant) injection 1 mg PRN    glucose chewable tablet 16 g PRN    glucose chewable tablet 24 g PRN    linezolid 600mg/300ml 600 mg/300 mL IVPB 600 mg Q12H    losartan tablet 100 mg Daily    nitroGLYCERIN SL tablet 0.4 mg Q5 Min PRN    pantoprazole EC tablet 40 mg Daily    sodium chloride 0.9% flush 10 mL PRN    sterile water 1,000 mL with sodium bicarbonate 1 mEq/mL (8.4 %) 150 mEq infusion Continuous     Family History     Problem Relation (Age of Onset)    COPD Mother    Cancer Mother    Diabetes Daughter    Heart disease Father        Tobacco Use    Smoking status: Former Smoker     Packs/day: 1.00     Years: 15.00     Pack years: 15.00     Last attempt to quit: 2009     Years since quittin.0    Smokeless tobacco: Never Used   Substance and Sexual Activity    Alcohol use: No    Drug use: No     Comment: Is on prescription opiod, no non prescribed use    Sexual activity: Not Currently     Partners: Female     Review of Systems   Constitutional: Positive for activity change, appetite change, diaphoresis, fatigue and unexpected weight change. Negative for chills and fever.   HENT: Negative.  Negative for congestion and trouble swallowing.    Eyes: Negative.    Respiratory: Positive for cough, choking, chest tightness, shortness of breath and  wheezing.    Cardiovascular: Negative.  Negative for chest pain, palpitations and leg swelling.   Gastrointestinal: Negative.  Negative for abdominal distention, abdominal pain, nausea and vomiting.   Genitourinary: Negative.  Negative for decreased urine volume, difficulty urinating, dysuria, enuresis, flank pain, frequency, hematuria, penile swelling, scrotal swelling and urgency.   Musculoskeletal: Negative.  Negative for arthralgias, back pain, joint swelling and neck pain.   Skin: Negative for rash.   Neurological: Negative.  Negative for tremors, seizures and headaches.   Psychiatric/Behavioral: Negative.  Negative for confusion and sleep disturbance. The patient is not nervous/anxious.    All other systems reviewed and are negative.    Objective:     Vital Signs (Most Recent):  Temp: 97.6 °F (36.4 °C) (01/31/20 0953)  Pulse: 63 (01/31/20 0953)  Resp: 18 (01/31/20 0953)  BP: (!) 119/57 (01/31/20 0953)  SpO2: 98 % (01/31/20 0953)  O2 Device (Oxygen Therapy): nasal cannula (01/31/20 0571) Vital Signs (24h Range):  Temp:  [97.6 °F (36.4 °C)-98.1 °F (36.7 °C)] 97.6 °F (36.4 °C)  Pulse:  [63-76] 63  Resp:  [18-23] 18  SpO2:  [93 %-98 %] 98 %  BP: ()/(49-70) 119/57     Weight: 80.9 kg (178 lb 5.6 oz) (01/31/20 0348)  Body mass index is 23.53 kg/m².  Body surface area is 2.04 meters squared.    No intake/output data recorded.    Physical Exam   Constitutional: He is oriented to person, place, and time. He appears well-developed and well-nourished.   HENT:   Head: Normocephalic and atraumatic.   Nose: Nose normal.   Eyes: EOM are normal.   PERRL   Neck: Normal range of motion. Neck supple.   Cardiovascular: Normal rate, regular rhythm and normal heart sounds.   Pulmonary/Chest: Effort normal. No respiratory distress. He has wheezes. He has rales.   Abdominal: Soft. Bowel sounds are normal. He exhibits no distension.   Musculoskeletal: Normal range of motion. He exhibits deformity. He exhibits no edema.   Right  BKA   Neurological: He is alert and oriented to person, place, and time. He displays abnormal reflex. A sensory deficit is present. He exhibits abnormal muscle tone. Coordination abnormal.   Skin: Skin is dry. Capillary refill takes 2 to 3 seconds.   Nursing note and vitals reviewed.      Significant Labs:  All labs within the past 24 hours have been reviewed.    Significant Imaging:  Labs: Reviewed  X-Ray: Reviewed  US: Reviewed  Echo: Reviewed    Assessment/Plan:     Hydronephrosis of left kidney   urology consultation    Acute on chronic anemia  Needs aggressive Hematology follow-up may need erythropoietin and IV iron therapy.    Acute renal failure superimposed on chronic kidney disease  Patient's baseline creatinine is about 2.0 with is about an function of 20-25%.  Patient's creatinine is low due to poor muscle mass and a BKA.  Check cystatin C for accurate GFR once acute kidney function is better.  Acute kidney injury is complicated with low hemoglobin poor cardiac output and low renal perfusion.  Also patient has a history of horseshoe kidney.  Patient had a stent placed in the left kidney in the past.  Patient has a previous ultrasound from February 2019 showing no renal artery stenosis.  Ultrasound today shows severe hydronephrosis on the left side which will need urgent urological intervention/consultation.  Case discussed with Dr. Patterson for possible transferring the patient to Urology service as soon as possible.  At this point I would agree with gentle hydration and packed RBCs.  Patient's fraction excretion of sodium is greater than 1% and patient's BNP is higher than baseline.  I would continue with says close surveillance to follow with shortness of breath and worsening of congestive heart failure with blood transfusion and would have a low threshold to start IV diuretics and stop the IV fluids.      Ureteral stent retained  Left-sided hydronephrosis is new and probably needs urology consultation May  are may not need percutaneous nephrostomy tube versus cystoscopy        Thank you for your consult.     Brenton Jacobson MD   Nephrology  [facility] [place]

## 2020-01-31 NOTE — SUBJECTIVE & OBJECTIVE
Scheduled Meds:   amLODIPine  10 mg Oral Daily    aspirin  81 mg Oral Daily    carvedilol  25 mg Oral BID WM    cefTRIAXone (ROCEPHIN) IVPB  2 g Intravenous Q24H    clopidogrel  75 mg Oral Daily    linezolid 600mg/300ml  600 mg Intravenous Q12H    losartan  100 mg Oral Daily    pantoprazole  40 mg Oral Daily     Continuous Infusions:   sodium chloride 0.9% 75 mL/hr at 01/31/20 0409     PRN Meds:sodium chloride, Dextrose 10% Bolus, Dextrose 10% Bolus, glucagon (human recombinant), glucose, glucose, nitroglycerin, sodium chloride 0.9%    Review of patient's allergies indicates:   Allergen Reactions    Morphine Itching        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    Late complications of amputation stump     rseolved with further amputation( MRSA then none since 2014)    Lipoma of colon     colonoscopy 2/21/2014    Myocardial infarction     per patient 2000 & 9/2012    Peripheral vascular disease     Phantom limb syndrome     patient reports only intermittent not problematic, not worsening    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    Spinal cord disease     L4L5 disc    Stroke     Tobacco dependence     resolved    Ureteral stent retained      Past Surgical History:   Procedure Laterality Date    ABDOMINAL AORTIC ANEURYSM REPAIR      ABDOMINAL AORTIC ANEURYSM REPAIR  1996/2014    AMPUTATION, LOWER LIMB      AORTA - BILATERAL  FEMORAL ARTERY BYPASS GRAFT      Left and right leg    CORONARY ANGIOPLASTY WITH STENT PLACEMENT      Three placed in heart    CYSTOSCOPY W/ RETROGRADES Left 2018    Procedure: CYSTOSCOPY, WITH RETROGRADE PYELOGRAM;  Surgeon: Scooter Jin IV, MD;  Location: [facility] OR;  Service: Urology;  Laterality: Left;    CYSTOSCOPY W/ URETERAL STENT PLACEMENT Left 2018    Procedure: CYSTOSCOPY, WITH URETERAL STENT INSERTION;  Surgeon: Scooter Jin IV, MD;  Location: [facility] OR;  Service: Urology;  Laterality: Left;    CYSTOSCOPY W/ URETERAL STENT REMOVAL Left 2018    Procedure: CYSTOSCOPY, WITH URETERAL STENT REMOVAL;  Surgeon: Scooter Jin IV, MD;  Location: [facility] OR;  Service: Urology;  Laterality: Left;    FOOT AMPUTATION THROUGH METATARSAL      left    FOOT SURGERY Bilateral     per patient multiple toe amputations prior to.  partial foot amputation:first great toe then other toes     KIDNEY SURGERY  2014    per patient separation of horseshoe kidney @ time of AAA repair    LEFT HEART CATHETERIZATION Left 3/7/2019    Procedure: CATHETERIZATION, HEART, LEFT;  Surgeon: Adriel Boone MD;  Location: [facility] CATH LAB;  Service: Cardiology;  Laterality: Left;  630 admit for IV hydration  10am start    LUNG LOBECTOMY Right     per patient not cancer    right below knee amputation   (approx)    SMALL INTESTINE SURGERY      per patient partial @ time of aaa repair  not small bowel - large bowel bowel compromised bythtwe AAAbowel    TONSILLECTOMY   aprox    URETERAL STENT PLACEMENT Left     annually replaced since  or so  Dr Jin       Family History     Problem Relation (Age of Onset)    COPD Mother    Cancer Mother    Diabetes Daughter    Heart disease Father        Tobacco Use    Smoking status: Former Smoker     Packs/day: 1.00     Years: 15.00     Pack years: 15.00     Last attempt to quit: 2009     Years since quittin.0    Smokeless  tobacco: Never Used   Substance and Sexual Activity    Alcohol use: No    Drug use: No     Comment: Is on prescription opiod, no non prescribed use    Sexual activity: Not Currently     Partners: Female     Review of Systems   Constitutional: Negative for activity change, appetite change, chills and fever.   HENT: Negative for sinus pain, sore throat and voice change.    Eyes: Negative for pain, redness and visual disturbance.   Respiratory: Positive for shortness of breath (on oxygen). Negative for cough and chest tightness.    Cardiovascular: Negative for chest pain, palpitations and leg swelling.   Gastrointestinal: Negative for abdominal pain, diarrhea, nausea and vomiting.   Genitourinary: Negative for difficulty urinating.   Musculoskeletal: Positive for gait problem. Negative for back pain and joint swelling.   Skin: Negative for color change and wound.   Neurological: Negative for dizziness, weakness and numbness.   Psychiatric/Behavioral: The patient is not nervous/anxious.      Objective:     Vital Signs (Most Recent):  Temp: 97.8 °F (36.6 °C) (01/31/20 0605)  Pulse: 65 (01/31/20 0605)  Resp: 20 (01/31/20 0605)  BP: (!) 122/59 (01/31/20 0605)  SpO2: 97 % (01/31/20 0605) Vital Signs (24h Range):  Temp:  [97.6 °F (36.4 °C)-98.1 °F (36.7 °C)] 97.8 °F (36.6 °C)  Pulse:  [63-76] 65  Resp:  [19-23] 20  SpO2:  [93 %-98 %] 97 %  BP: ()/(49-70) 122/59     Weight: 80.9 kg (178 lb 5.6 oz)  Body mass index is 23.53 kg/m².    Foot Exam   Vascular:  DP pulse on the left is 0/4.  PT pulse on the left is 1/4.  Capillary refill is intact less than 3 sec.  The foot is warm to warm from proximal distal.  There is no pedal hair growth to the dorsal aspect of the foot. There is no pretibial edema or lower leg swelling.    Neurological:  Protective sensation is not intact to plantar left surface of the foot digits.  Patient has no sensation with 5.07 g Hooper-Murray monofilament.  Sensation light touch is intact.  Proprioception the ankle joint is intact. Sensation to pinprick reduced absent.     Orthopedic:  Below-knee amputation right.  Left proximal transmetatarsal amputation.  No pain on palpation of the left foot or with medial to lateral compression of the ulceration.  Equinus deformity is noted.    Dermatology:  There is an ulceration measuring 0.8 cm x 0.6cm to the lateral aspect of the plantar left foot. The ulceration does not show visible osseous structures. The wound does probe near bone, however it appears the cortex is intact. There is no drainage or purulence expressed from the ulceration. The wound base is mixed fibrogranular. There is no evidence of erythema, increased edema, lymphangitis or proximal streaking. No malodor.     Laboratory:  Blood Cultures: No results for input(s): LABBLOO in the last 48 hours.  CBC:   Recent Labs   Lab 01/31/20 0441   WBC 4.51   RBC 2.50*   HGB 6.7*   HCT 22.8*      MCV 91   MCH 26.8*   MCHC 29.4*     CMP:   Recent Labs   Lab 01/31/20  0034 01/31/20 0441    109   CALCIUM 7.6* 7.6*   ALBUMIN 3.0*  --    PROT 7.1  --    * 133*   K 5.1 5.4*   CO2 11* 12*    110   BUN 61* 62*   CREATININE 6.6* 6.6*   ALKPHOS 131  --    ALT <5*  --    AST 6*  --    BILITOT 0.3  --      CRP:   Recent Labs   Lab 01/31/20 0441   .6*     ESR:   Recent Labs   Lab 01/31/20 0441   SEDRATE 80*     Wound Cultures: No results for input(s): LABAERO in the last 4320 hours.  Microbiology Results (last 7 days)     Procedure Component Value Units Date/Time    Aerobic culture [[id number]] Collected:  01/31/20 0324    Order Status:  Sent Specimen:  Abscess from Foot, Left Updated:  01/31/20 0342    Culture, Anaerobe [[id number]] Collected:  01/31/20 0324    Order Status:  Sent Specimen:  Abscess from Foot, Left Updated:  01/31/20 0342        No results for input(s): COLORU, CLARITYU, SPECGRAV, PHUR, PROTEINUA, GLUCOSEU, BILIRUBINCON, BLOODU, WBCU, RBCU, BACTERIA, MUCUS, NITRITE,  LEUKOCYTESUR, UROBILINOGEN, HYALINECASTS in the last 168 hours.    Diagnostic Results:  I have reviewed all pertinent imaging results/findings within the past 24 hours.    Clinical Findings:  There is an ulceration present to the plantar aspect of the left foot.  There is no proximal streaking, purulence on my exam, increased erythema or swelling. And reviewing the sed rate and CRP, there could be chronic osteomyelitis present to the residual metatarsals vs. Underlying abscess. With a normal/stable WBC, I would expect there is likely chronic infection rather than underlying abscess.    We did discuss treatment options regarding possible underlying osteomyelitis whether that is antibiotics depending on his kidney function versus further resection.  Discussed with patient that based on his current blood flow status with decreased palpable pulses in left lower extremity, recommend getting arterial duplex to determine if adequate blood flow is getting to the foot ulceration.    Bone scan was ordered by Infectious Disease this morning as patient has claustrophobia and elevated creatinine with CKD stage 3.  Will continue to monitor and await for the results of this scan.     Will continue to follow cultures taken of wound

## 2020-01-31 NOTE — ED PROVIDER NOTES
SCRIBE #1 NOTE: I, Maria A Webster, am scribing for, and in the presence of, Ricardo Red MD. I have scribed the entire note.      History      Chief Complaint   Patient presents with    Chest Pain     onset approximately an hour ago. relieved after x3 nitro and ASA       Review of patient's allergies indicates:   Allergen Reactions    Morphine Itching        HPI   HPI    1/31/2020, 1:36 AM   History obtained from the patient      History of Present Illness: Kodi Ribeiro is a 70 y.o. male patient with a H/o Anemia, AP, CAD, MI, HTN, and HLD who presents to the Emergency Department for evaluation of chest painwhich onset approximately 1 hour PTA. Symptoms are constant and moderate in severity. No mitigating or exacerbating factors reported. Associated sxs include diaphoresis and SOB. Patient denies any fever, N/V, cough, palpitations, dysuria, melena, and all other sxs at this time. Patient states he took 2 doses of Nitro with some relief in sxs. No further complaints or concerns at this time.       Arrival mode: Ambulance Service    PCP: Norma Nuñez MD       Past Medical History:  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    Late complications of amputation stump     rseolved with further amputation( MRSA then none since 2014)    Lipoma of colon     colonoscopy 2/21/2014    Myocardial infarction     per patient 2000 & 9/2012    Peripheral vascular  disease     Phantom limb syndrome     patient reports only intermittent not problematic, not worsening    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    Spinal cord disease     L4L5 disc    Stroke     Tobacco dependence     resolved    Ureteral stent retained        Past Surgical History:  Past Surgical History:   Procedure Laterality Date    ABDOMINAL AORTIC ANEURYSM REPAIR      ABDOMINAL AORTIC ANEURYSM REPAIR  1996/2014    AMPUTATION, LOWER LIMB      AORTA - BILATERAL FEMORAL ARTERY BYPASS GRAFT  2014    Left and right leg    CORONARY ANGIOPLASTY WITH STENT PLACEMENT  2000    Three placed in heart    CYSTOSCOPY W/ RETROGRADES Left 5/29/2018    Procedure: CYSTOSCOPY, WITH RETROGRADE PYELOGRAM;  Surgeon: Scooter Jin IV, MD;  Location: [facility] OR;  Service: Urology;  Laterality: Left;    CYSTOSCOPY W/ URETERAL STENT PLACEMENT Left 5/29/2018    Procedure: CYSTOSCOPY, WITH URETERAL STENT INSERTION;  Surgeon: Scooter Jin IV, MD;  Location: [facility] OR;  Service: Urology;  Laterality: Left;    CYSTOSCOPY W/ URETERAL STENT REMOVAL Left 5/29/2018    Procedure: CYSTOSCOPY, WITH URETERAL STENT REMOVAL;  Surgeon: Scooter Jin IV, MD;  Location: [facility] OR;  Service: Urology;  Laterality: Left;    FOOT AMPUTATION THROUGH METATARSAL  1996    left    FOOT SURGERY Bilateral 1980's    per patient multiple toe amputations prior to.  partial foot amputation:first great toe then other toes     KIDNEY SURGERY  2014    per patient separation of horseshoe kidney @ time of AAA repair    LEFT HEART CATHETERIZATION Left 3/7/2019    Procedure: CATHETERIZATION, HEART, LEFT;  Surgeon: Adriel Boone MD;  Location: [facility] CATH LAB;  Service: Cardiology;  Laterality: Left;  630 admit for IV hydration  10am start    LUNG LOBECTOMY Right 1970s    per patient not cancer    right below knee amputation  2009 (approx)    SMALL INTESTINE SURGERY  2014    per patient partial @ time of aaa repair  not small bowel - large  bowel bowel compromised benson AAAreji    TONSILLECTOMY   aprox    URETERAL STENT PLACEMENT Left     annually replaced since 2012 or so  Dr Jin         Family History:  Family History   Problem Relation Age of Onset    Cancer Mother         lung    COPD Mother     Heart disease Father         MI but per patient bc of old age    Diabetes Daughter     Eczema Neg Hx     Lupus Neg Hx     Psoriasis Neg Hx     Melanoma Neg Hx     Kidney disease Neg Hx     Stroke Neg Hx     Mental illness Neg Hx     Mental retardation Neg Hx     Hypertension Neg Hx     Hyperlipidemia Neg Hx     Drug abuse Neg Hx     Alcohol abuse Neg Hx     Depression Neg Hx        Social History:  Social History     Tobacco Use    Smoking status: Former Smoker     Packs/day: 1.00     Years: 15.00     Pack years: 15.00     Last attempt to quit: 2009     Years since quittin.0    Smokeless tobacco: Never Used   Substance and Sexual Activity    Alcohol use: No    Drug use: No     Comment: Is on prescription opiod, no non prescribed use    Sexual activity: Not Currently     Partners: Female       ROS   Review of Systems   Constitutional: Positive for diaphoresis. Negative for fever.   HENT: Negative for sore throat.    Respiratory: Positive for shortness of breath. Negative for cough.    Cardiovascular: Positive for chest pain.   Gastrointestinal: Negative for nausea and vomiting.   Genitourinary: Negative for dysuria.   Musculoskeletal: Negative for back pain.   Skin: Negative for rash.   Neurological: Negative for weakness.   Hematological: Does not bruise/bleed easily.   All other systems reviewed and are negative.    Physical Exam      Initial Vitals [20 0004]   BP Pulse Resp Temp SpO2   (!) 110/49 76 (!) 22 97.9 °F (36.6 °C) (!) 94 %      MAP       --          Physical Exam  Nursing Notes and Vital Signs Reviewed.  Constitutional: Patient is in no acute distress. Well-developed and well-nourished.  Head:  Atraumatic. Normocephalic.  Eyes: PERRL. EOM intact. Conjunctivae are not pale. No scleral icterus.  ENT: Mucous membranes are moist.   Neck: Supple. Full ROM. No lymphadenopathy.  Cardiovascular: Regular rate. Regular rhythm. No murmurs, rubs, or gallops. Distal pulses are 2+ and symmetric.  Pulmonary/Chest: No respiratory distress. Clear to auscultation bilaterally. No wheezing or rales.  Abdominal: Soft and non-distended.  There is no tenderness.  Musculoskeletal: Right BKA. No obvious deformities. No edema.  Skin: Warm and dry.  Neurological:  Alert, awake, and appropriate.  Normal speech.  No acute focal neurological deficits are appreciated.  Psychiatric: Normal affect. Good eye contact. Appropriate in content.    ED Course    Critical Care  Date/Time: 1/31/2020 3:02 AM  Performed by: Ricardo Red MD  Authorized by: Ricardo Red MD   Direct patient critical care time: 15 minutes  Additional history critical care time: 5 minutes  Ordering / reviewing critical care time: 5 minutes  Documentation critical care time: 5 minutes  Consulting other physicians critical care time: 5 minutes  Total critical care time (exclusive of procedural time) : 35 minutes  Critical care time was exclusive of separately billable procedures and treating other patients and teaching time.  Critical care was necessary to treat or prevent imminent or life-threatening deterioration of the following conditions: MARCELA, Pulmonary Congestion, Chronic Anemia.  Critical care was time spent personally by me on the following activities: blood draw for specimens, development of treatment plan with patient or surrogate, discussions with consultants, interpretation of cardiac output measurements, evaluation of patient's response to treatment, examination of patient, obtaining history from patient or surrogate, ordering and performing treatments and interventions, ordering and review of radiographic studies, ordering and review of laboratory studies,  "pulse oximetry, re-evaluation of patient's condition and review of old charts.        ED Vital Signs:  Vitals:    01/31/20 0004 01/31/20 0202 01/31/20 0302 01/31/20 0348   BP: (!) 110/49 (!) 117/55 119/67    Pulse: 76 65 68    Resp: (!) 22 19 (!) 23    Temp: 97.9 °F (36.6 °C)      TempSrc: Oral      SpO2: (!) 94% (!) 93% (!) 94%    Weight:    80.9 kg (178 lb 5.6 oz)   Height: 6' 1" (1.854 m)   6' 1" (1.854 m)    01/31/20 0349 01/31/20 0459   BP: (!) 144/70    Pulse: 71 64   Resp: 20    Temp: 98.1 °F (36.7 °C)    TempSrc: Oral    SpO2: 97%    Weight:     Height:         Abnormal Lab Results:  Labs Reviewed   CBC W/ AUTO DIFFERENTIAL - Abnormal; Notable for the following components:       Result Value    RBC 2.55 (*)     Hemoglobin 6.9 (*)     Hematocrit 22.8 (*)     Mean Corpuscular Hemoglobin Conc 30.3 (*)     RDW 15.7 (*)     Lymph # 0.5 (*)     Gran% 82.2 (*)     Lymph% 8.2 (*)     All other components within normal limits   COMPREHENSIVE METABOLIC PANEL - Abnormal; Notable for the following components:    Sodium 134 (*)     CO2 11 (*)     BUN, Bld 61 (*)     Creatinine 6.6 (*)     Calcium 7.6 (*)     Albumin 3.0 (*)     AST 6 (*)     ALT <5 (*)     eGFR if  9 (*)     eGFR if non  8 (*)     All other components within normal limits   B-TYPE NATRIURETIC PEPTIDE - Abnormal; Notable for the following components:    BNP 1,253 (*)     All other components within normal limits   CULTURE, ANAEROBIC   CULTURE, AEROBIC  (SPECIFY SOURCE)   TROPONIN I   TYPE & SCREEN   PREPARE RBC SOFT        All Lab Results:  Results for orders placed or performed during the hospital encounter of 01/31/20   CBC auto differential   Result Value Ref Range    WBC 6.36 3.90 - 12.70 K/uL    RBC 2.55 (L) 4.60 - 6.20 M/uL    Hemoglobin 6.9 (L) 14.0 - 18.0 g/dL    Hematocrit 22.8 (L) 40.0 - 54.0 %    Mean Corpuscular Volume 89 82 - 98 fL    Mean Corpuscular Hemoglobin 27.1 27.0 - 31.0 pg    Mean Corpuscular " Hemoglobin Conc 30.3 (L) 32.0 - 36.0 g/dL    RDW 15.7 (H) 11.5 - 14.5 %    Platelets 212 150 - 350 K/uL    MPV 10.2 9.2 - 12.9 fL    Immature Granulocytes 0.5 0.0 - 0.5 %    Gran # (ANC) 5.2 1.8 - 7.7 K/uL    Immature Grans (Abs) 0.03 0.00 - 0.04 K/uL    Lymph # 0.5 (L) 1.0 - 4.8 K/uL    Mono # 0.4 0.3 - 1.0 K/uL    Eos # 0.2 0.0 - 0.5 K/uL    Baso # 0.02 0.00 - 0.20 K/uL    nRBC 0 0 /100 WBC    Gran% 82.2 (H) 38.0 - 73.0 %    Lymph% 8.2 (L) 18.0 - 48.0 %    Mono% 6.4 4.0 - 15.0 %    Eosinophil% 2.4 0.0 - 8.0 %    Basophil% 0.3 0.0 - 1.9 %    Differential Method Automated    Comprehensive metabolic panel   Result Value Ref Range    Sodium 134 (L) 136 - 145 mmol/L    Potassium 5.1 3.5 - 5.1 mmol/L    Chloride 108 95 - 110 mmol/L    CO2 11 (L) 23 - 29 mmol/L    Glucose 109 70 - 110 mg/dL    BUN, Bld 61 (H) 8 - 23 mg/dL    Creatinine 6.6 (H) 0.5 - 1.4 mg/dL    Calcium 7.6 (L) 8.7 - 10.5 mg/dL    Total Protein 7.1 6.0 - 8.4 g/dL    Albumin 3.0 (L) 3.5 - 5.2 g/dL    Total Bilirubin 0.3 0.1 - 1.0 mg/dL    Alkaline Phosphatase 131 55 - 135 U/L    AST 6 (L) 10 - 40 U/L    ALT <5 (L) 10 - 44 U/L    Anion Gap 15 8 - 16 mmol/L    eGFR if African American 9 (A) >60 mL/min/1.73 m^2    eGFR if non African American 8 (A) >60 mL/min/1.73 m^2   Troponin I #1   Result Value Ref Range    Troponin I <0.006 0.000 - 0.026 ng/mL   B-Type natriuretic peptide (BNP)   Result Value Ref Range    BNP 1,253 (H) 0 - 99 pg/mL   Troponin I #2   Result Value Ref Range    Troponin I 0.024 0.000 - 0.026 ng/mL   Type & Screen   Result Value Ref Range    Group & Rh O POS     Indirect Yani NEG    Prepare RBC 1 Unit   Result Value Ref Range    UNIT NUMBER [id number]     Product Code [id number]     DISPENSE STATUS CROSSMATCHED     CODING SYSTEM [id number]     Unit Blood Type Code 5100     Unit Blood Type O POS     Unit Expiration [id number]            Imaging Results:  Imaging Results          X-Ray Chest AP Portable (In process)                2:52 AM:  Per ED physician, pt's CXR results: Pulmonary Congestion.       The EKG was ordered, reviewed, and independently interpreted by the ED provider.  Interpretation time: 00:41  Rate: 69 BPM  Rhythm: normal sinus rhythm  Interpretation: No acute ST changes. No STEMI.    The Emergency Provider reviewed the vital signs and test results, which are outlined above.    ED Discussion     2:50 AM: Discussed case with Coni Salgado NP ([facility]). Dr. Waldrop agrees with current care and management of pt and accepts admission.   Admitting Service: Hospital Medicine  Admitting Physician: Dr. Waldrop  Admit to: OBS Tele    2:51 AM: Re-evaluated pt. I have discussed test results, shared treatment plan, and the need for admission with patient and family at bedside. Pt and family express understanding at this time and agree with all information. All questions answered. Pt and family have no further questions or concerns at this time. Pt is ready for admit.         ED Medication(s):  Medications   amLODIPine tablet 10 mg (has no administration in time range)   aspirin EC tablet 81 mg (has no administration in time range)   carvedilol tablet 25 mg (has no administration in time range)   clopidogrel tablet 75 mg (has no administration in time range)   losartan tablet 100 mg (has no administration in time range)   nitroGLYCERIN SL tablet 0.4 mg (has no administration in time range)   pantoprazole EC tablet 40 mg (has no administration in time range)   0.9%  NaCl infusion (for blood administration) (has no administration in time range)   cefTRIAXone (ROCEPHIN) 2 g in dextrose 5 % 50 mL IVPB (2 g Intravenous New Bag 1/31/20 0409)   linezolid 600mg/300ml 600 mg/300 mL IVPB 600 mg (has no administration in time range)   0.9%  NaCl infusion ( Intravenous New Bag 1/31/20 0409)   sodium chloride 0.9% flush 10 mL (has no administration in time range)   glucose chewable tablet 16 g (has no administration in time range)   glucose chewable  tablet 24 g (has no administration in time range)   glucagon (human recombinant) injection 1 mg (has no administration in time range)   dextrose 10% (D10W) Bolus (has no administration in time range)   dextrose 10% (D10W) Bolus (has no administration in time range)             Medical Decision Making    Medical Decision Making:   Clinical Tests:   Lab Tests: Ordered and Reviewed  Radiological Study: Reviewed and Ordered  Medical Tests: Ordered and Reviewed           Scribe Attestation:   Scribe #1: I performed the above scribed service and the documentation accurately describes the services I performed. I attest to the accuracy of the note.    Attending:   Physician Attestation Statement for Scribe #1: I, Ricardo Red MD, personally performed the services described in this documentation, as scribed by Maria A Webster, in my presence, and it is both accurate and complete.          Clinical Impression       ICD-10-CM ICD-9-CM   1. Acute on chronic anemia D64.9 285.9   2. Ulcer of left foot, unspecified ulcer stage L97.529 707.15   3. Chest pain R07.9 786.50   4. MARCELA (acute kidney injury) N17.9 584.9   5. Pulmonary congestion R09.89 514       Disposition:   Disposition: Placed in Observation  Condition: Fair         Ricardo Red MD  01/31/20 050

## 2020-01-31 NOTE — HOSPITAL COURSE
Patient was admitted emergency room.  As reducing patient was started on IV antibiotics of Rocephin and Zyvox.  Initial white blood cell count was 5.3 with hemoglobin of 7.0.  Repeat labs were done to show a white blood cell count of 4.71 with hemoglobin 6.7 after 1 units of packed red blood cells.  An x-ray was ordered upon receiving the consultation.  Up-to-date labs were also ordered and sed rate/CRP were also included to assess for underlying osteomyelitis patient was scheduled for a nuclear bone scan per Infectious Disease this morning for possible osteomyelitis.  Patient baseline creatinine is 2.4 and was noted that the creatinine is 6.6 upon admission with initiation of IV antibiotics.

## 2020-02-01 PROBLEM — I25.10 CORONARY ARTERY DISEASE INVOLVING NATIVE CORONARY ARTERY OF NATIVE HEART WITHOUT ANGINA PECTORIS: Status: ACTIVE | Noted: 2020-02-01

## 2020-02-01 LAB
ALBUMIN SERPL BCP-MCNC: 2.7 G/DL (ref 3.5–5.2)
ANION GAP SERPL CALC-SCNC: 13 MMOL/L (ref 8–16)
BASOPHILS # BLD AUTO: 0.03 K/UL (ref 0–0.2)
BASOPHILS NFR BLD: 0.9 % (ref 0–1.9)
BUN SERPL-MCNC: 58 MG/DL (ref 8–23)
CALCIUM SERPL-MCNC: 7.5 MG/DL (ref 8.7–10.5)
CHLORIDE SERPL-SCNC: 103 MMOL/L (ref 95–110)
CO2 SERPL-SCNC: 18 MMOL/L (ref 23–29)
CREAT SERPL-MCNC: 5.3 MG/DL (ref 0.5–1.4)
DIFFERENTIAL METHOD: ABNORMAL
EOSINOPHIL # BLD AUTO: 0.1 K/UL (ref 0–0.5)
EOSINOPHIL NFR BLD: 2.9 % (ref 0–8)
ERYTHROCYTE [DISTWIDTH] IN BLOOD BY AUTOMATED COUNT: 15.4 % (ref 11.5–14.5)
EST. GFR  (AFRICAN AMERICAN): 12 ML/MIN/1.73 M^2
EST. GFR  (NON AFRICAN AMERICAN): 10 ML/MIN/1.73 M^2
FERRITIN SERPL-MCNC: 311 NG/ML (ref 20–300)
FOLATE SERPL-MCNC: 2.3 NG/ML (ref 4–24)
GLUCOSE SERPL-MCNC: 99 MG/DL (ref 70–110)
HCT VFR BLD AUTO: 25.1 % (ref 40–54)
HGB BLD-MCNC: 8 G/DL (ref 14–18)
IMM GRANULOCYTES # BLD AUTO: 0.01 K/UL (ref 0–0.04)
IMM GRANULOCYTES NFR BLD AUTO: 0.3 % (ref 0–0.5)
IRON SERPL-MCNC: 18 UG/DL (ref 45–160)
LYMPHOCYTES # BLD AUTO: 0.3 K/UL (ref 1–4.8)
LYMPHOCYTES NFR BLD: 9.1 % (ref 18–48)
MCH RBC QN AUTO: 27.9 PG (ref 27–31)
MCHC RBC AUTO-ENTMCNC: 31.9 G/DL (ref 32–36)
MCV RBC AUTO: 88 FL (ref 82–98)
MONOCYTES # BLD AUTO: 0.3 K/UL (ref 0.3–1)
MONOCYTES NFR BLD: 9.4 % (ref 4–15)
NEUTROPHILS # BLD AUTO: 2.7 K/UL (ref 1.8–7.7)
NEUTROPHILS NFR BLD: 77.4 % (ref 38–73)
NRBC BLD-RTO: 0 /100 WBC
PHOSPHATE SERPL-MCNC: 4.8 MG/DL (ref 2.7–4.5)
PLATELET # BLD AUTO: 186 K/UL (ref 150–350)
PMV BLD AUTO: 9.9 FL (ref 9.2–12.9)
POTASSIUM SERPL-SCNC: 4.7 MMOL/L (ref 3.5–5.1)
PROCALCITONIN SERPL IA-MCNC: 0.18 NG/ML
RBC # BLD AUTO: 2.87 M/UL (ref 4.6–6.2)
SATURATED IRON: 9 % (ref 20–50)
SODIUM SERPL-SCNC: 134 MMOL/L (ref 136–145)
TOTAL IRON BINDING CAPACITY: 192 UG/DL (ref 250–450)
TRANSFERRIN SERPL-MCNC: 130 MG/DL (ref 200–375)
VIT B12 SERPL-MCNC: 255 PG/ML (ref 210–950)
WBC # BLD AUTO: 3.42 K/UL (ref 3.9–12.7)

## 2020-02-01 PROCEDURE — 63600175 PHARM REV CODE 636 W HCPCS: Mod: HCNC | Performed by: FAMILY MEDICINE

## 2020-02-01 PROCEDURE — 63600175 PHARM REV CODE 636 W HCPCS: Mod: HCNC | Performed by: INTERNAL MEDICINE

## 2020-02-01 PROCEDURE — 82746 ASSAY OF FOLIC ACID SERUM: CPT | Mod: HCNC

## 2020-02-01 PROCEDURE — 36415 COLL VENOUS BLD VENIPUNCTURE: CPT | Mod: HCNC

## 2020-02-01 PROCEDURE — 25000003 PHARM REV CODE 250: Mod: HCNC | Performed by: INTERNAL MEDICINE

## 2020-02-01 PROCEDURE — A4217 STERILE WATER/SALINE, 500 ML: HCPCS | Mod: HCNC | Performed by: INTERNAL MEDICINE

## 2020-02-01 PROCEDURE — 80069 RENAL FUNCTION PANEL: CPT | Mod: HCNC

## 2020-02-01 PROCEDURE — 99233 SBSQ HOSP IP/OBS HIGH 50: CPT | Mod: HCNC,,, | Performed by: INTERNAL MEDICINE

## 2020-02-01 PROCEDURE — 25000003 PHARM REV CODE 250: Mod: HCNC | Performed by: FAMILY MEDICINE

## 2020-02-01 PROCEDURE — 21400001 HC TELEMETRY ROOM: Mod: HCNC

## 2020-02-01 PROCEDURE — 99233 PR SUBSEQUENT HOSPITAL CARE,LEVL III: ICD-10-PCS | Mod: HCNC,,, | Performed by: INTERNAL MEDICINE

## 2020-02-01 PROCEDURE — 82728 ASSAY OF FERRITIN: CPT | Mod: HCNC

## 2020-02-01 PROCEDURE — A4217 STERILE WATER/SALINE, 500 ML: HCPCS | Mod: HCNC | Performed by: FAMILY MEDICINE

## 2020-02-01 PROCEDURE — 87040 BLOOD CULTURE FOR BACTERIA: CPT | Mod: HCNC

## 2020-02-01 PROCEDURE — 83540 ASSAY OF IRON: CPT | Mod: HCNC

## 2020-02-01 PROCEDURE — 85025 COMPLETE CBC W/AUTO DIFF WBC: CPT | Mod: HCNC

## 2020-02-01 PROCEDURE — 84145 PROCALCITONIN (PCT): CPT | Mod: HCNC

## 2020-02-01 PROCEDURE — 82607 VITAMIN B-12: CPT | Mod: HCNC

## 2020-02-01 RX ORDER — HYDRALAZINE HYDROCHLORIDE 20 MG/ML
10 INJECTION INTRAMUSCULAR; INTRAVENOUS EVERY 8 HOURS PRN
Status: DISCONTINUED | OUTPATIENT
Start: 2020-02-01 | End: 2020-02-06 | Stop reason: HOSPADM

## 2020-02-01 RX ORDER — ACETAMINOPHEN 500 MG
500 TABLET ORAL EVERY 6 HOURS PRN
Status: DISCONTINUED | OUTPATIENT
Start: 2020-02-01 | End: 2020-02-06 | Stop reason: HOSPADM

## 2020-02-01 RX ORDER — DOXYCYCLINE HYCLATE 100 MG
100 TABLET ORAL EVERY 12 HOURS
Status: DISCONTINUED | OUTPATIENT
Start: 2020-02-01 | End: 2020-02-05

## 2020-02-01 RX ADMIN — LINEZOLID 600 MG: 600 INJECTION, SOLUTION INTRAVENOUS at 05:02

## 2020-02-01 RX ADMIN — ACETAMINOPHEN 500 MG: 500 TABLET ORAL at 02:02

## 2020-02-01 RX ADMIN — SODIUM BICARBONATE: 84 INJECTION, SOLUTION INTRAVENOUS at 02:02

## 2020-02-01 RX ADMIN — ACETAMINOPHEN 500 MG: 500 TABLET ORAL at 08:02

## 2020-02-01 RX ADMIN — PANTOPRAZOLE SODIUM 40 MG: 40 TABLET, DELAYED RELEASE ORAL at 08:02

## 2020-02-01 RX ADMIN — LOSARTAN POTASSIUM 100 MG: 50 TABLET, FILM COATED ORAL at 08:02

## 2020-02-01 RX ADMIN — CARVEDILOL 25 MG: 12.5 TABLET, FILM COATED ORAL at 07:02

## 2020-02-01 RX ADMIN — AMLODIPINE BESYLATE 10 MG: 10 TABLET ORAL at 08:02

## 2020-02-01 RX ADMIN — CEFTRIAXONE 2 G: 2 INJECTION, SOLUTION INTRAVENOUS at 05:02

## 2020-02-01 RX ADMIN — SODIUM BICARBONATE: 84 INJECTION, SOLUTION INTRAVENOUS at 01:02

## 2020-02-01 RX ADMIN — CARVEDILOL 25 MG: 12.5 TABLET, FILM COATED ORAL at 04:02

## 2020-02-01 RX ADMIN — DOXYCYCLINE HYCLATE 100 MG: 100 TABLET, COATED ORAL at 08:02

## 2020-02-01 NOTE — SUBJECTIVE & OBJECTIVE
Interval History:   Pt has no new C/O today . SOB is at baseline . On O2 at 2L/min. BNP is elevated noted .  Continued on gentle hydration with Bicarb . Serum creatinine is slightly better 6.6>5.3   Off of diuretics , ASA and Plavix  Possible ureteral stent change on Monday by  (Urologist)     S/P 1 unit of P-RBC yesterday  . Pt has H/O   chronic iron def anemia and is followed in the Hematology clinic on a regular basis . Baseline Hemoglobin ranges 8 to 9.   Bone scan suspicious for left foot osteomyelitis possibly chronic and does not appear an active issue at this time. Podiatry consult obtained and plan is noted   Will D/C broad spectrum antibiotic . Pt does have left foot ulceration and will place pt on Oral Doxycycline for 5 to  7 days.           Review of Systems   Constitutional: Negative for activity change, appetite change and fever.   HENT: Negative for sore throat.    Eyes: Negative for visual disturbance.   Respiratory: Positive for shortness of breath. Negative for cough and chest tightness.    Cardiovascular: Negative for chest pain, palpitations and leg swelling.   Gastrointestinal: Negative for abdominal distention, abdominal pain, constipation, diarrhea, nausea and vomiting.   Endocrine: Negative for polyuria.   Genitourinary: Negative for decreased urine volume, dysuria, flank pain, frequency and hematuria.   Musculoskeletal: Negative for back pain and gait problem.   Skin: Negative for rash.   Neurological: Negative for syncope, speech difficulty, weakness, light-headedness and headaches.   Psychiatric/Behavioral: Negative for confusion, hallucinations and sleep disturbance.     Objective:     Vital Signs (Most Recent):  Temp: 98.8 °F (37.1 °C) (02/01/20 1202)  Pulse: 68 (02/01/20 1700)  Resp: 18 (02/01/20 0724)  BP: (!) 141/68 (02/01/20 1202)  SpO2: 99 % (02/01/20 1202) Vital Signs (24h Range):  Temp:  [97 °F (36.1 °C)-98.8 °F (37.1 °C)] 98.8 °F (37.1 °C)  Pulse:  [65-77] 68  Resp:   [16-19] 18  SpO2:  [93 %-99 %] 99 %  BP: (118-151)/(57-68) 141/68     Weight: 79 kg (174 lb 2.6 oz)  Body mass index is 22.98 kg/m².    Intake/Output Summary (Last 24 hours) at 2/1/2020 1750  Last data filed at 2/1/2020 1500  Gross per 24 hour   Intake 3148.75 ml   Output 500 ml   Net 2648.75 ml      Physical Exam   Constitutional: He is oriented to person, place, and time. He appears well-developed and well-nourished. No distress.   HENT:   Head: Normocephalic and atraumatic.   Mouth/Throat: No oropharyngeal exudate.   Eyes: Pupils are equal, round, and reactive to light. Conjunctivae and EOM are normal.   Neck: Normal range of motion. Neck supple. No JVD present. No thyromegaly present.   Cardiovascular: Normal rate, regular rhythm and normal heart sounds.   No murmur heard.  Pulmonary/Chest: Effort normal and breath sounds normal. No respiratory distress. He has no wheezes. He has no rales. He exhibits no tenderness.   Abdominal: Soft. Bowel sounds are normal. He exhibits no distension. There is no tenderness. There is no rebound and no guarding.   Musculoskeletal: Normal range of motion. He exhibits deformity (Rt BKA. Left foor transmetatarsal amputation ). He exhibits no edema.   Lymphadenopathy:     He has no cervical adenopathy.   Neurological: He is alert and oriented to person, place, and time. He displays normal reflexes. No cranial nerve deficit or sensory deficit.   Skin: Skin is warm and dry. No rash noted. He is not diaphoretic.   Psychiatric: He has a normal mood and affect.       Significant Labs:   BMP:   Recent Labs   Lab 02/01/20  0829   GLU 99   *   K 4.7      CO2 18*   BUN 58*   CREATININE 5.3*   CALCIUM 7.5*     CBC:   Recent Labs   Lab 01/31/20  0034 01/31/20  0441 01/31/20  1444 02/01/20  0829   WBC 6.36 4.51  --  3.42*   HGB 6.9* 6.7* 7.8* 8.0*   HCT 22.8* 22.8* 25.6* 25.1*    202  --  186     CMP:   Recent Labs   Lab 01/31/20  0034 01/31/20  0441 02/01/20  0829   *  133* 134*   K 5.1 5.4* 4.7    110 103   CO2 11* 12* 18*    109 99   BUN 61* 62* 58*   CREATININE 6.6* 6.6* 5.3*   CALCIUM 7.6* 7.6* 7.5*   PROT 7.1  --   --    ALBUMIN 3.0*  --  2.7*   BILITOT 0.3  --   --    ALKPHOS 131  --   --    AST 6*  --   --    ALT <5*  --   --    ANIONGAP 15 11 13   EGFRNONAA 8* 8* 10*       Significant Imaging:

## 2020-02-01 NOTE — PLAN OF CARE
Problem: Adult Inpatient Plan of Care  Goal: Plan of Care Review  Outcome: Ongoing, Progressing   Pt in bed AAOx4.   Plan of Care reviewed, Verbalized understanding.  Patient remained free from falls, fall precautions in place.   Pt is NSR on monitor. VSS.   PIV CDI with sterile water and sodium bicarbonate running at 125ml/hr.  Call bell and personal belongings within reach.   Hourly rounding complete. Reminded to call for assistance.   No complaints at this time.   Will continue to monitor.

## 2020-02-01 NOTE — HOSPITAL COURSE
1/31-  Severe left sided hydronephrosis with ureteral stent visualized. In setting of acute renal failure concern with this being cause. This was discussed with Dr. Brennen Maldonado who feels that hydronephrosis is a chronic issue and not sole cause of acute renal failure. Patient already has stent and may need IR consult for nephrostomy tube placement. Will consult IR on case. Attempted to contact patient's urologist earlier today Dr. Jin, left voicemail. Will dc asa and plavix due to pending procedure. Currently receiving 1 unit prbc. Will transfuse < 7.      Discussed case with Dr. Jin. Concerned with outlet obstruction. Recommended getting bladder scan which was negative. Recommendations were to workup other causes of renal failure as left sided hydronephrosis alone should not cause acute renal failure with the right kidney working.      Started sterile water with 150meq bicarb this am. Cont to monitor urine output. Fena calculated was 2.2% indicating intrinsic renal disease.      Case was discussed with IR earlier. Concerned with bleeding due to asa and plavix use. Does not recommend nephrostomy tube at this time due to risks. If any procedure needs to be done, recommended ureteral stent changed instead as it is less invasive.     2/1  Pt has no new C/O today . SOB is at baseline . On O2 at 2L/min. BNP is elevated noted .  Continued on gentle hydration with Bicarb . Serum creatinine is slightly better 6.6>5.3   Off of diuretics , ASA and Plavix  Possible ureteral stent change on Monday by  (Urologist)     S/P 1 unit of P-RBC yesterday  . Pt has H/O   chronic iron def anemia and is followed in the Hematology clinic on a regular basis . Baseline Hemoglobin ranges 8 to 9.   Bone scan suspicious for left foot osteomyelitis possibly chronic and does not appear an active issue at this time. Podiatry consult obtained and plan is noted   Will D/C broad spectrum antibiotic . Pt does have left foot  ulceration and will place pt on Oral Doxycycline for 5 to  7 days.       2/2-  Last night event  noted. Pt was transferred to ICU overnight due to acute worsening of hypoxia required BiPAP therapy for oxygenation .  IVF discontinued . Pt had received Lasix 60 mg IV x 1 dose   Serum creatinine down to 4.2   Repeat U/S kidneys again showed Left sided hydronephrosis which is unchanged from prior study on admit     CT chest without contrast revealed yary pleural effusion L>R, airspace disease involving yary lung bases , evidence of pulmonary fibrosis , subpleural bulla and centrilobular emphysema     2/3  SOB is better . Currently on O2 at 2L/min . Downgrade to Telemetry today   Urology consult obtained . CT abd /pelvis revealed interval worsening appearance of chronic severe left-sided hydronephrosis, and progression of severe atrophy of the left renal cortex. Left-sided double-J ureteral stent in satisfactory position. Per Urology left ureteral stent exchange tomorrow to lower risk of  pyelonephritis and to preserve remaining left kidney function.     Serum creatinine is trending down suggestive of renal function recovery . 42> 3.6   Urine output is satisfactory     Placed back on gentle diuresis due to cardiac decompensation   CT chest c/w emphysema. Continue bronchodilator treatment     2/4    Pt underwent Cystoscopy  with placement of left ureteral stent today without immediate complication   Post procedure pt developed SOB with increased O2 demand . Initially placed on biPAP and later wean to NC at 5 L/min  Additional lasix 20 mg IV x 1 dose given . Pt appears anxious and anxiolytics prescribed.     Serum creatinine improved since yesterday . 2.5 today . 6.6> 3.6>2.5  H/H are stable after 1 unit of P-RBC transfusion since admission     2/5  Remains on O2 . Currently 5L/min via NC   Noted episodes of chest pain (  substernal , 7/10 intensity , relived with NTG) and Fever of 100.7 last night and again today at noon .  S/P left ureteral stent exchange yesterday   EKG revealed NSR and nonspecific  T wave changed . Troponin 0.013> 0.26  Known H/O CAD.   V/Q scan done this morning -  Very low probability of pul embolism     Plan- Resume ASA, Plavix . Resume Isosorbide mononitrate            Get Blood cultures , UA, Urine culture            D/C Doxycycline ,start empiric Levofloxacin     2/6  No further fever spike noted .  Other vitals are stable   Serum creatinine remains stable at 2.2   Lasix transitioned to oral . Pt was evaluated for Home O2 and qualified   Lasix dose adjusted to 40 mg M/W/F and 20 mg other days per week   Home med Losartan discontinued and pt started on Hydralazine along with Imdur   No further antimicrobials offered.     At this point pt pt is deemed medically stable to discharge home. He is set up with Home Health for Home O2 , wound care , nursing , and PT/OT .  Pt is advised to follow up with PCP in 3 to 7 days.

## 2020-02-01 NOTE — ASSESSMENT & PLAN NOTE
-H/O Left ureteral stricture and ureteral stent . Will have left ureteral stent change possibly on Monday by

## 2020-02-01 NOTE — SUBJECTIVE & OBJECTIVE
Interval History: breathing is better. Low PO intake and doesn't feel well     Review of patient's allergies indicates:   Allergen Reactions    Morphine Itching     Current Facility-Administered Medications   Medication Frequency    0.9%  NaCl infusion (for blood administration) Q24H PRN    amLODIPine tablet 10 mg Daily    carvedilol tablet 25 mg BID WM    cefTRIAXone (ROCEPHIN) 2 g in dextrose 5 % 50 mL IVPB Q24H    dextrose 10% (D10W) Bolus PRN    dextrose 10% (D10W) Bolus PRN    glucagon (human recombinant) injection 1 mg PRN    glucose chewable tablet 16 g PRN    glucose chewable tablet 24 g PRN    hydrALAZINE injection 10 mg Q8H PRN    linezolid 600mg/300ml 600 mg/300 mL IVPB 600 mg Q12H    nitroGLYCERIN SL tablet 0.4 mg Q5 Min PRN    pantoprazole EC tablet 40 mg Daily    sodium chloride 0.9% flush 10 mL PRN    sterile water 1,000 mL with sodium bicarbonate 1 mEq/mL (8.4 %) 150 mEq infusion Continuous       Objective:     Vital Signs (Most Recent):  Temp: 97 °F (36.1 °C) (02/01/20 0724)  Pulse: 75 (02/01/20 0724)  Resp: 18 (02/01/20 0724)  BP: 139/65 (02/01/20 0724)  SpO2: 96 % (02/01/20 0724)  O2 Device (Oxygen Therapy): nasal cannula (01/31/20 0550) Vital Signs (24h Range):  Temp:  [97 °F (36.1 °C)-98.4 °F (36.9 °C)] 97 °F (36.1 °C)  Pulse:  [61-77] 75  Resp:  [16-19] 18  SpO2:  [93 %-99 %] 96 %  BP: (118-151)/(57-67) 139/65     Weight: 79 kg (174 lb 2.6 oz) (02/01/20 0418)  Body mass index is 22.98 kg/m².  Body surface area is 2.02 meters squared.    I/O last 3 completed shifts:  In: 3901.2 [P.O.:236; I.V.:2316.7; Blood:348.5; IV Piggyback:1000]  Out: 550 [Urine:550]    Physical Exam   Constitutional: He is oriented to person, place, and time. He appears well-developed and well-nourished.   HENT:   Head: Normocephalic and atraumatic.   Nose: Nose normal.   Eyes: EOM are normal.   PERRL   Neck: Normal range of motion. Neck supple.   Cardiovascular: Normal rate, regular rhythm and normal heart  sounds.   Pulmonary/Chest: Effort normal. No respiratory distress. He has wheezes. He has rales.   Abdominal: Soft. Bowel sounds are normal. He exhibits no distension.   Musculoskeletal: Normal range of motion. He exhibits deformity. He exhibits no edema.   Right BKA   Neurological: He is alert and oriented to person, place, and time. He displays abnormal reflex. A sensory deficit is present. He exhibits abnormal muscle tone. Coordination abnormal.   Skin: Skin is dry. Capillary refill takes 2 to 3 seconds.   Nursing note and vitals reviewed.      Significant Labs:  CMP:   Recent Labs   Lab 01/31/20  0034  02/01/20  0829      < > 99   CALCIUM 7.6*   < > 7.5*   ALBUMIN 3.0*  --  2.7*   PROT 7.1  --   --    *   < > 134*   K 5.1   < > 4.7   CO2 11*   < > 18*      < > 103   BUN 61*   < > 58*   CREATININE 6.6*   < > 5.3*   ALKPHOS 131  --   --    ALT <5*  --   --    AST 6*  --   --    BILITOT 0.3  --   --     < > = values in this interval not displayed.     All labs within the past 24 hours have been reviewed.     Significant Imaging:  Labs: Reviewed  X-Ray: Reviewed  US: Reviewed

## 2020-02-01 NOTE — ASSESSMENT & PLAN NOTE
Patient's baseline creatinine is about 2.0 with is about an function of 20-25%.  Patient's creatinine is low due to poor muscle mass and a BKA.  Check cystatin C for accurate GFR once acute kidney function is better.  Acute kidney injury is complicated with low hemoglobin poor cardiac output and low renal perfusion.  Also patient has a history of horseshoe kidney.  Patient had a stent placed in the left kidney in the past.  Patient has a previous ultrasound from February 2019 showing no renal artery stenosis.      At this point keep gentle hydration s/p one  packed RBCs.

## 2020-02-01 NOTE — PROGRESS NOTES
[facility] -   Nephrology  Progress Note    Patient Name: Kodi Ribeiro  MRN: [id number]  Admission Date: 1/31/2020  Hospital Length of Stay: 1 days  Attending Provider: Markell Magaña MD   Primary Care Physician: Norma Nuñez MD  Principal Problem:<principal problem not specified>    Subjective:     HPI: Patient is a 70-year-old male with history of hypertension and chronic kidney disease stage 3/stage IV.  Patient's baseline creatinine has fluctuated in the past with multiple episodes of acute kidney injury last time was seen in the hospital was in April of 2019 with a creatinine went up to 4.8 and at times higher.  Since then patient has been having fluctuations in creatinine up to 2.0.  Patient was recently discharged from the hospital.  Patient comes back with shortness of breath.  Was found to have acute kidney injury on chronic kidney disease with a creatinine up to 6.0.  Patient also found to have a hemoglobin drop down to 7.0 and is receiving 1 unit of blood transfusion with gentle hydration.  Patient also has shortness of breath and congestive heart failure.    Interval History: breathing is better. Low PO intake and doesn't feel well     Review of patient's allergies indicates:   Allergen Reactions    Morphine Itching     Current Facility-Administered Medications   Medication Frequency    0.9%  NaCl infusion (for blood administration) Q24H PRN    amLODIPine tablet 10 mg Daily    carvedilol tablet 25 mg BID WM    cefTRIAXone (ROCEPHIN) 2 g in dextrose 5 % 50 mL IVPB Q24H    dextrose 10% (D10W) Bolus PRN    dextrose 10% (D10W) Bolus PRN    glucagon (human recombinant) injection 1 mg PRN    glucose chewable tablet 16 g PRN    glucose chewable tablet 24 g PRN    hydrALAZINE injection 10 mg Q8H PRN    linezolid 600mg/300ml 600 mg/300 mL IVPB 600 mg Q12H    nitroGLYCERIN SL tablet 0.4 mg Q5 Min PRN    pantoprazole EC tablet 40 mg Daily    sodium chloride 0.9% flush 10 mL PRN     sterile water 1,000 mL with sodium bicarbonate 1 mEq/mL (8.4 %) 150 mEq infusion Continuous       Objective:     Vital Signs (Most Recent):  Temp: 97 °F (36.1 °C) (02/01/20 0724)  Pulse: 75 (02/01/20 0724)  Resp: 18 (02/01/20 0724)  BP: 139/65 (02/01/20 0724)  SpO2: 96 % (02/01/20 0724)  O2 Device (Oxygen Therapy): nasal cannula (01/31/20 0558) Vital Signs (24h Range):  Temp:  [97 °F (36.1 °C)-98.4 °F (36.9 °C)] 97 °F (36.1 °C)  Pulse:  [61-77] 75  Resp:  [16-19] 18  SpO2:  [93 %-99 %] 96 %  BP: (118-151)/(57-67) 139/65     Weight: 79 kg (174 lb 2.6 oz) (02/01/20 0418)  Body mass index is 22.98 kg/m².  Body surface area is 2.02 meters squared.    I/O last 3 completed shifts:  In: 3901.2 [P.O.:236; I.V.:2316.7; Blood:348.5; IV Piggyback:1000]  Out: 550 [Urine:550]    Physical Exam   Constitutional: He is oriented to person, place, and time. He appears well-developed and well-nourished.   HENT:   Head: Normocephalic and atraumatic.   Nose: Nose normal.   Eyes: EOM are normal.   PERRL   Neck: Normal range of motion. Neck supple.   Cardiovascular: Normal rate, regular rhythm and normal heart sounds.   Pulmonary/Chest: Effort normal. No respiratory distress. He has wheezes. He has rales.   Abdominal: Soft. Bowel sounds are normal. He exhibits no distension.   Musculoskeletal: Normal range of motion. He exhibits deformity. He exhibits no edema.   Right BKA   Neurological: He is alert and oriented to person, place, and time. He displays abnormal reflex. A sensory deficit is present. He exhibits abnormal muscle tone. Coordination abnormal.   Skin: Skin is dry. Capillary refill takes 2 to 3 seconds.   Nursing note and vitals reviewed.      Significant Labs:  CMP:   Recent Labs   Lab 01/31/20  0034  02/01/20  0829      < > 99   CALCIUM 7.6*   < > 7.5*   ALBUMIN 3.0*  --  2.7*   PROT 7.1  --   --    *   < > 134*   K 5.1   < > 4.7   CO2 11*   < > 18*      < > 103   BUN 61*   < > 58*   CREATININE 6.6*   < >  5.3*   ALKPHOS 131  --   --    ALT <5*  --   --    AST 6*  --   --    BILITOT 0.3  --   --     < > = values in this interval not displayed.     All labs within the past 24 hours have been reviewed.     Significant Imaging:  Labs: Reviewed  X-Ray: Reviewed  US: Reviewed    Assessment/Plan:     Acute renal failure superimposed on chronic kidney disease  Patient's baseline creatinine is about 2.0 with is about an function of 20-25%.  Patient's creatinine is low due to poor muscle mass and a BKA.  Check cystatin C for accurate GFR once acute kidney function is better.  Acute kidney injury is complicated with low hemoglobin poor cardiac output and low renal perfusion.  Also patient has a history of horseshoe kidney.  Patient had a stent placed in the left kidney in the past.  Patient has a previous ultrasound from February 2019 showing no renal artery stenosis.      At this point keep gentle hydration s/p one  packed RBCs.      Ureteral stent retained  Left-sided hydronephrosis : MARCELA is better : follow closely         Thank you for your consult.     Brenton Jacobson MD  Nephrology  [facility]

## 2020-02-01 NOTE — ASSESSMENT & PLAN NOTE
Will do wound culture , podiatry consult -will need to rule out osteomyelitis , will do bone scan in AM   WILL start Rocephin/zyvox  2/1  D/C Rocephin and Zyvox   Start oral Doxycycline 100 mg po

## 2020-02-01 NOTE — PROGRESS NOTES
Subjective:       Patient ID: Kodi Ribeiro is a 70 y.o. male.    Interval history:  Patient reports that he is breathing better this morning, continues to feel unwell.  States that he is having no pain to the left foot. Continues to see no redness or swelling.  States dressings have been changed this morning.    Review of patient's allergies indicates:   Allergen Reactions    Morphine Itching       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    Late complications of amputation stump     rseolved with further amputation( MRSA then none since 2014)    Lipoma of colon     colonoscopy 2/21/2014    Myocardial infarction     per patient 2000 & 9/2012    Peripheral vascular disease     Phantom limb syndrome     patient reports only intermittent not problematic, not worsening    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    Spinal cord disease     L4L5 disc    Stroke     Tobacco dependence     resolved    Ureteral stent retained        Family History   Problem Relation Age of Onset    Cancer Mother         lung    COPD Mother     Heart disease Father         MI but per patient bc of old age    Diabetes Daughter     Eczema Neg Hx     Lupus Neg Hx     Psoriasis Neg Hx     Melanoma Neg Hx     Kidney disease Neg Hx     Stroke Neg Hx     Mental illness Neg Hx     Mental retardation Neg Hx     Hypertension Neg Hx     Hyperlipidemia Neg Hx      Drug abuse Neg Hx     Alcohol abuse Neg Hx     Depression Neg Hx        Social History     Socioeconomic History    Marital status:      Spouse name: Laury    Number of children: 2    Years of education: Not on file    Highest education level: Not on file   Occupational History    Occupation: Retired      Comment: [employer]   Social Needs    Financial resource strain: Not on file    Food insecurity:     Worry: Not on file     Inability: Not on file    Transportation needs:     Medical: Not on file     Non-medical: Not on file   Tobacco Use    Smoking status: Former Smoker     Packs/day: 1.00     Years: 15.00     Pack years: 15.00     Last attempt to quit: 2009     Years since quittin.0    Smokeless tobacco: Never Used   Substance and Sexual Activity    Alcohol use: No    Drug use: No     Comment: Is on prescription opiod, no non prescribed use    Sexual activity: Not Currently     Partners: Female   Lifestyle    Physical activity:     Days per week: Not on file     Minutes per session: Not on file    Stress: Not on file   Relationships    Social connections:     Talks on phone: Not on file     Gets together: Not on file     Attends [employer] service: Not on file     Active member of club or organization: Not on file     Attends meetings of clubs or organizations: Not on file     Relationship status: Not on file   Other Topics Concern    Not on file   Social History Narrative     . 4 children alive and well. 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  Still drives. Does not have a Living Will.       Past Surgical History:   Procedure Laterality Date    ABDOMINAL AORTIC ANEURYSM REPAIR      ABDOMINAL AORTIC ANEURYSM REPAIR      AMPUTATION, LOWER LIMB      AORTA - BILATERAL FEMORAL ARTERY BYPASS GRAFT      Left and right leg    CORONARY ANGIOPLASTY WITH STENT PLACEMENT      Three placed in  heart    CYSTOSCOPY W/ RETROGRADES Left 5/29/2018    Procedure: CYSTOSCOPY, WITH RETROGRADE PYELOGRAM;  Surgeon: Scooter Jin IV, MD;  Location: [facility] OR;  Service: Urology;  Laterality: Left;    CYSTOSCOPY W/ URETERAL STENT PLACEMENT Left 5/29/2018    Procedure: CYSTOSCOPY, WITH URETERAL STENT INSERTION;  Surgeon: Scooter Jin IV, MD;  Location: [facility] OR;  Service: Urology;  Laterality: Left;    CYSTOSCOPY W/ URETERAL STENT REMOVAL Left 5/29/2018    Procedure: CYSTOSCOPY, WITH URETERAL STENT REMOVAL;  Surgeon: Scooter Jin IV, MD;  Location: [facility] OR;  Service: Urology;  Laterality: Left;    FOOT AMPUTATION THROUGH METATARSAL  1996    left    FOOT SURGERY Bilateral 1980's    per patient multiple toe amputations prior to.  partial foot amputation:first great toe then other toes     KIDNEY SURGERY  2014    per patient separation of horseshoe kidney @ time of AAA repair    LEFT HEART CATHETERIZATION Left 3/7/2019    Procedure: CATHETERIZATION, HEART, LEFT;  Surgeon: Adriel Boone MD;  Location: [facility] CATH LAB;  Service: Cardiology;  Laterality: Left;  630 admit for IV hydration  10am start    LUNG LOBECTOMY Right 1970s    per patient not cancer    right below knee amputation  2009 (approx)    SMALL INTESTINE SURGERY  2014    per patient partial @ time of aaa repair  not small bowel - large bowel bowel compromised bythtwe AAAbowel    TONSILLECTOMY  1955 aprox    URETERAL STENT PLACEMENT Left     annually replaced since 2012 or so  Dr Jin       Review of Systems   Constitutional: Positive for appetite change and fatigue.   Eyes: Negative for visual disturbance.   Respiratory: Positive for shortness of breath. Negative for cough and chest tightness.    Cardiovascular: Negative for chest pain and leg swelling.   Musculoskeletal: Negative for joint swelling.        Below-knee amputation right.  Transmetatarsal amputation left foot.   Skin: Positive for wound (Ulceration plantar left foot).  "Negative for color change and pallor.   Neurological: Positive for weakness. Negative for dizziness and headaches.          Objective:   /65 (BP Location: Left arm, Patient Position: Lying)   Pulse 74   Temp 97 °F (36.1 °C) (Oral)   Resp 18   Ht 6' 1" (1.854 m)   Wt 79 kg (174 lb 2.6 oz)   SpO2 96%   BMI 22.98 kg/m²     NM Bone Scan 3 Phase Foot  Narrative: EXAMINATION:  NM BONE SCAN 3 PHASE FOOT    CLINICAL HISTORY:  osteomyelitis -rule out left foot;    TECHNIQUE:  Following the IV administration of 25.3 mCi of Tc-99m-MDP, immediate dynamic and early static images of the left foot were acquired followed by anterior and posterior delayed  images.    COMPARISON:  Left foot radiographs same date and priors    FINDINGS:  On the flow and blood pool images there is increased radiotracer uptake within the left foot.    On the delayed views, there is persistent, more focal area of increased uptake within the left foot.  Impression: Three-phase focal uptake within the left foot suspicious for osteomyelitis.    Electronically signed by: Stefan Dickson  Date:    01/31/2020  Time:    17:07  US Lower Extrem Arteries Left with ETRELL  Narrative: EXAMINATION:  US ARTERIAL LOWER EXTREMITY LEFT WITH TERELL (XPD)    CLINICAL HISTORY:  ulceration, nonpalpable dorsalis pedis pulse, history of PVD;    TECHNIQUE:  Grayscale, color, and duplex imaging was performed of the left lower extremity.    COMPARISON:  None.    FINDINGS:  Left lower extremity arterial velocities and waveforms are as follows:    CFA: 119.8 centimeters/second, biphasic.    DFA: 134.6 centimeters/second, biphasic.    Proximal SFA: 143.9 centimeters/second, biphasic    Mid SFA: 52.5 centimeters/second, biphasic.    Distal SFA: 98.2 centimeters/second, biphasic.    Popliteal artery: 55.7 centimeters/second, biphasic    PTA: 39.5 centimeters/second, biphasic.    Distal DPA 74.9 centimeters/second, biphasic.    Left lower extremity TERELL:    -PTA: 0.63.    -DPA: " 0.65.  Impression: Patent bilateral lower extremity arteries.    Decreased left lower extremity TERELL compatible with peripheral vascular disease.    Electronically signed by: Stefan Dickson  Date:    01/31/2020  Time:    12:09  US Retroperitoneal Complete (Kidney and  Narrative: EXAMINATION:  US RETROPERITONEAL COMPLETE    CLINICAL HISTORY:  MARCELA;    TECHNIQUE:  Ultrasound of the kidneys and urinary bladder was performed including color flow and Doppler evaluation of the kidneys.    COMPARISON:  Renal artery stenosis ultrasound 02/21/2019 with priors    FINDINGS:  Right kidney: The right kidney measures 11.1 cm. Mild increased cortical echogenicity.  No loss of corticomedullary distinction. Resistive index measures 0.71.  No mass. No renal stone. No hydronephrosis.    Left kidney: The left kidney measures 15.2 cm. Increased cortical echogenicity and cortical thinning.  There is loss of corticomedullary distinction. Resistive index measures 0.77.  No mass. No renal stone. Severe hydronephrosis.  A left ureteral stent is visualized.    The splenic resistive index is 0.78    Urinary bladder is decompressed around a Loza catheter.  Impression: Severe left-sided hydronephrosis with a left ureteral stent partially visualized.    Chronic medical renal disease.    Electronically signed by: Stefan Dickson  Date:    01/31/2020  Time:    09:29  X-Ray Foot Complete Left  Narrative: EXAMINATION:  XR FOOT COMPLETE 3 VIEW LEFT    CLINICAL HISTORY:  infection;.    TECHNIQUE:  AP, lateral and oblique views of the left foot were performed.    COMPARISON:  Left knee radiograph 03/22/2018 and 01/30/2013    FINDINGS:  Postsurgical changes of prior forefoot amputation at the tarsometatarsal level.  No significant osseous destruction or lytic change of the visualized osseous structures.  No acute fracture.  Joint alignment is anatomic.  Prominent plantar calcaneal enthesophyte.  There are vascular calcifications.  No radiopaque foreign  body.  Impression: Stable postsurgical changes of a prior forefoot amputation.  No evidence of osseous destructive process to suggest osteomyelitis.    Electronically signed by: Stefan Dickson  Date:    01/31/2020  Time:    08:07  X-Ray Chest AP Portable  Narrative: EXAMINATION:  XR CHEST AP PORTABLE    CLINICAL HISTORY:  chest pain;    COMPARISON:  January 13th    FINDINGS:  Significant interval improvement with better aeration of both lung fields and decreased interstitial prominence/congestion.  Heart size is normal.  Can not exclude slight blunting of the right costophrenic angle which may be due to chronic change/scar versus minimal effusion.  No consolidation.  Left base shows significant improvement.  Impression: Significant improvement of the chest with near total clearing of the infiltrates and/or congestive changes.  Mild residual interstitial/vascular prominence.  Can not exclude a minimal right effusion.    Electronically signed by: Aurelio Portillo MD  Date:    01/31/2020  Time:    07:35       Physical Exam   LOWER EXTREMITY PHYSICAL EXAMINATION    Dressings were changed this morning to the left foot for left foot ulceration.  No presence of increased redness or swelling to left lower extremity.  No increase in warmth of skin temperature.    Imaging:         Results for orders placed during the hospital encounter of 01/31/20   X-Ray Foot Complete Left    Narrative EXAMINATION:  XR FOOT COMPLETE 3 VIEW LEFT    CLINICAL HISTORY:  infection;.    TECHNIQUE:  AP, lateral and oblique views of the left foot were performed.    COMPARISON:  Left knee radiograph 03/22/2018 and 01/30/2013    FINDINGS:  Postsurgical changes of prior forefoot amputation at the tarsometatarsal level.  No significant osseous destruction or lytic change of the visualized osseous structures.  No acute fracture.  Joint alignment is anatomic.  Prominent plantar calcaneal enthesophyte.  There are vascular calcifications.  No radiopaque  foreign body.      Impression Stable postsurgical changes of a prior forefoot amputation.  No evidence of osseous destructive process to suggest osteomyelitis.      Electronically signed by: Stefan Dickson  Date:    01/31/2020  Time:    08:07        No results found for this or any previous visit.        Assessment:     1.  Left plantar foot ulceration   2.  Possible chronic osteomyelitis left forefoot  3.  Peripheral vascular disease  4.  Acute on chronic kidney disease stage 3  5.  Hydronephrosis left kidney  6.  History of right below-knee amputation        Plan:       Discussed results of the bone scan taken on the left lower extremity per ID.  I discussed with the patient that this may be residual chronic osteomyelitis, however the ulceration was likely started by the new diabetic shoes which were incorrectly made to put him in a slight plantar flexed position leading to increased pressure to the forefoot and thereby causing excessive friction of this area resulting in a ulceration.No evidence of abscess requiring incision and drainage.    In regards to the left foot, I recommend treating this outpatient with offloading and shoe modifications and attempt to close the ulceration.  Arterial duplex does show depressed ABIs to the left foot with posterior tibial artery TERELL of 0.63 and dorsalis pedis of 0.65 which represents moderate peripheral arterial disease.  Both arteries were patent on the study.  This should help with wound healing.    I discussed with him that at this point, his hydronephrosis with kidney disease is a much more pressing matter as his white blood cell count has been stable.  Hemoglobin has been rising with added blood.  Recommend continued conservative treatments on the left lower extremity ulceration at this point.  This can be done outpatient once patient is stable for discharge.    Nephrology has been consulted for acute on chronic kidney disease with hydronephrosis of left kidney.      Continue with daily dressing changes.    No future appointments.

## 2020-02-01 NOTE — PLAN OF CARE
Pt c/o of anemia, ARF.   Interventions performed: cardiac monitoring continued, labs monitored, activity increased as tolerated, pain meds given prn, IV fluids continued, nephrology following, podiatry following.  Mobility status: Independent, minimal assist.   Patient SR on monitor.   Safety maintained per pt.  Pt aware of POC.  Will continue to monitor.

## 2020-02-02 PROBLEM — J96.01 ACUTE RESPIRATORY FAILURE WITH HYPOXIA: Status: ACTIVE | Noted: 2020-02-02

## 2020-02-02 PROBLEM — I50.33 ACUTE ON CHRONIC DIASTOLIC HEART FAILURE: Status: ACTIVE | Noted: 2020-01-13

## 2020-02-02 LAB
ALLENS TEST: ABNORMAL
ALLENS TEST: ABNORMAL
ANION GAP SERPL CALC-SCNC: 13 MMOL/L (ref 8–16)
BASOPHILS # BLD AUTO: 0.04 K/UL (ref 0–0.2)
BASOPHILS NFR BLD: 1.1 % (ref 0–1.9)
BUN SERPL-MCNC: 48 MG/DL (ref 8–23)
CALCIUM SERPL-MCNC: 8 MG/DL (ref 8.7–10.5)
CHLORIDE SERPL-SCNC: 101 MMOL/L (ref 95–110)
CO2 SERPL-SCNC: 20 MMOL/L (ref 23–29)
CREAT SERPL-MCNC: 4.2 MG/DL (ref 0.5–1.4)
DELSYS: ABNORMAL
DELSYS: ABNORMAL
DIFFERENTIAL METHOD: ABNORMAL
EOSINOPHIL # BLD AUTO: 0.2 K/UL (ref 0–0.5)
EOSINOPHIL NFR BLD: 5.1 % (ref 0–8)
EP: 5
EP: 5
ERYTHROCYTE [DISTWIDTH] IN BLOOD BY AUTOMATED COUNT: 15.5 % (ref 11.5–14.5)
ERYTHROCYTE [SEDIMENTATION RATE] IN BLOOD BY WESTERGREN METHOD: 12 MM/H
ERYTHROCYTE [SEDIMENTATION RATE] IN BLOOD BY WESTERGREN METHOD: 12 MM/H
EST. GFR  (AFRICAN AMERICAN): 15 ML/MIN/1.73 M^2
EST. GFR  (NON AFRICAN AMERICAN): 13 ML/MIN/1.73 M^2
FIO2: 100
FIO2: 45
GLUCOSE SERPL-MCNC: 96 MG/DL (ref 70–110)
HCO3 UR-SCNC: 21 MMOL/L (ref 24–28)
HCO3 UR-SCNC: 21.6 MMOL/L (ref 24–28)
HCT VFR BLD AUTO: 28.3 % (ref 40–54)
HGB BLD-MCNC: 8.7 G/DL (ref 14–18)
IMM GRANULOCYTES # BLD AUTO: 0.02 K/UL (ref 0–0.04)
IMM GRANULOCYTES NFR BLD AUTO: 0.5 % (ref 0–0.5)
IP: 10
IP: 12
LYMPHOCYTES # BLD AUTO: 0.3 K/UL (ref 1–4.8)
LYMPHOCYTES NFR BLD: 8.6 % (ref 18–48)
MAGNESIUM SERPL-MCNC: 1.2 MG/DL (ref 1.6–2.6)
MCH RBC QN AUTO: 27 PG (ref 27–31)
MCHC RBC AUTO-ENTMCNC: 30.7 G/DL (ref 32–36)
MCV RBC AUTO: 88 FL (ref 82–98)
MODE: ABNORMAL
MODE: ABNORMAL
MONOCYTES # BLD AUTO: 0.3 K/UL (ref 0.3–1)
MONOCYTES NFR BLD: 8.1 % (ref 4–15)
NEUTROPHILS # BLD AUTO: 2.8 K/UL (ref 1.8–7.7)
NEUTROPHILS NFR BLD: 76.6 % (ref 38–73)
NRBC BLD-RTO: 0 /100 WBC
PCO2 BLDA: 34.2 MMHG (ref 35–45)
PCO2 BLDA: 49.5 MMHG (ref 35–45)
PH SMN: 7.25 [PH] (ref 7.35–7.45)
PH SMN: 7.4 [PH] (ref 7.35–7.45)
PLATELET # BLD AUTO: 201 K/UL (ref 150–350)
PMV BLD AUTO: 10.3 FL (ref 9.2–12.9)
PO2 BLDA: 103 MMHG (ref 80–100)
PO2 BLDA: 63 MMHG (ref 80–100)
POC BE: -4 MMOL/L
POC BE: -6 MMOL/L
POC SATURATED O2: 92 % (ref 95–100)
POC SATURATED O2: 97 % (ref 95–100)
POTASSIUM SERPL-SCNC: 4 MMOL/L (ref 3.5–5.1)
RBC # BLD AUTO: 3.22 M/UL (ref 4.6–6.2)
SAMPLE: ABNORMAL
SAMPLE: ABNORMAL
SITE: ABNORMAL
SITE: ABNORMAL
SODIUM SERPL-SCNC: 134 MMOL/L (ref 136–145)
TROPONIN I SERPL DL<=0.01 NG/ML-MCNC: 0.01 NG/ML (ref 0–0.03)
WBC # BLD AUTO: 3.7 K/UL (ref 3.9–12.7)

## 2020-02-02 PROCEDURE — 93005 ELECTROCARDIOGRAM TRACING: CPT | Mod: HCNC

## 2020-02-02 PROCEDURE — 25000242 PHARM REV CODE 250 ALT 637 W/ HCPCS: Mod: HCNC | Performed by: INTERNAL MEDICINE

## 2020-02-02 PROCEDURE — 84484 ASSAY OF TROPONIN QUANT: CPT | Mod: HCNC

## 2020-02-02 PROCEDURE — 36600 WITHDRAWAL OF ARTERIAL BLOOD: CPT | Mod: HCNC

## 2020-02-02 PROCEDURE — 99232 SBSQ HOSP IP/OBS MODERATE 35: CPT | Mod: HCNC,,, | Performed by: INTERNAL MEDICINE

## 2020-02-02 PROCEDURE — 25000003 PHARM REV CODE 250: Mod: HCNC | Performed by: INTERNAL MEDICINE

## 2020-02-02 PROCEDURE — 25000003 PHARM REV CODE 250: Mod: HCNC | Performed by: FAMILY MEDICINE

## 2020-02-02 PROCEDURE — 36415 COLL VENOUS BLD VENIPUNCTURE: CPT | Mod: HCNC

## 2020-02-02 PROCEDURE — 85025 COMPLETE CBC W/AUTO DIFF WBC: CPT | Mod: HCNC

## 2020-02-02 PROCEDURE — 27000190 HC CPAP FULL FACE MASK W/VALVE: Mod: HCNC

## 2020-02-02 PROCEDURE — 25000242 PHARM REV CODE 250 ALT 637 W/ HCPCS: Mod: HCNC | Performed by: NURSE PRACTITIONER

## 2020-02-02 PROCEDURE — 93010 EKG 12-LEAD: ICD-10-PCS | Mod: HCNC,,, | Performed by: INTERNAL MEDICINE

## 2020-02-02 PROCEDURE — 99900035 HC TECH TIME PER 15 MIN (STAT): Mod: HCNC

## 2020-02-02 PROCEDURE — 99291 PR CRITICAL CARE, E/M 30-74 MINUTES: ICD-10-PCS | Mod: HCNC,,, | Performed by: NURSE PRACTITIONER

## 2020-02-02 PROCEDURE — 63600175 PHARM REV CODE 636 W HCPCS: Mod: HCNC | Performed by: INTERNAL MEDICINE

## 2020-02-02 PROCEDURE — 99232 PR SUBSEQUENT HOSPITAL CARE,LEVL II: ICD-10-PCS | Mod: HCNC,,, | Performed by: INTERNAL MEDICINE

## 2020-02-02 PROCEDURE — 83735 ASSAY OF MAGNESIUM: CPT | Mod: HCNC

## 2020-02-02 PROCEDURE — 63600175 PHARM REV CODE 636 W HCPCS: Mod: HCNC | Performed by: NURSE PRACTITIONER

## 2020-02-02 PROCEDURE — 82803 BLOOD GASES ANY COMBINATION: CPT | Mod: HCNC

## 2020-02-02 PROCEDURE — 99291 CRITICAL CARE FIRST HOUR: CPT | Mod: HCNC,,, | Performed by: NURSE PRACTITIONER

## 2020-02-02 PROCEDURE — A4217 STERILE WATER/SALINE, 500 ML: HCPCS | Mod: HCNC | Performed by: INTERNAL MEDICINE

## 2020-02-02 PROCEDURE — 94660 CPAP INITIATION&MGMT: CPT | Mod: HCNC

## 2020-02-02 PROCEDURE — 20000000 HC ICU ROOM: Mod: HCNC

## 2020-02-02 PROCEDURE — 27000221 HC OXYGEN, UP TO 24 HOURS: Mod: HCNC

## 2020-02-02 PROCEDURE — 94640 AIRWAY INHALATION TREATMENT: CPT | Mod: HCNC

## 2020-02-02 PROCEDURE — 80048 BASIC METABOLIC PNL TOTAL CA: CPT | Mod: HCNC

## 2020-02-02 PROCEDURE — 93010 ELECTROCARDIOGRAM REPORT: CPT | Mod: HCNC,,, | Performed by: INTERNAL MEDICINE

## 2020-02-02 RX ORDER — MAGNESIUM SULFATE HEPTAHYDRATE 40 MG/ML
4 INJECTION, SOLUTION INTRAVENOUS ONCE
Status: COMPLETED | OUTPATIENT
Start: 2020-02-02 | End: 2020-02-02

## 2020-02-02 RX ORDER — FUROSEMIDE 20 MG/1
20 TABLET ORAL DAILY
Status: DISCONTINUED | OUTPATIENT
Start: 2020-02-03 | End: 2020-02-05

## 2020-02-02 RX ORDER — ALPRAZOLAM 0.5 MG/1
0.5 TABLET ORAL ONCE
Status: COMPLETED | OUTPATIENT
Start: 2020-02-02 | End: 2020-02-02

## 2020-02-02 RX ORDER — CYANOCOBALAMIN 1000 UG/ML
1000 INJECTION, SOLUTION INTRAMUSCULAR; SUBCUTANEOUS ONCE
Status: COMPLETED | OUTPATIENT
Start: 2020-02-02 | End: 2020-02-02

## 2020-02-02 RX ORDER — IPRATROPIUM BROMIDE AND ALBUTEROL SULFATE 2.5; .5 MG/3ML; MG/3ML
3 SOLUTION RESPIRATORY (INHALATION) EVERY 6 HOURS
Status: DISCONTINUED | OUTPATIENT
Start: 2020-02-02 | End: 2020-02-05

## 2020-02-02 RX ORDER — FUROSEMIDE 10 MG/ML
60 INJECTION INTRAMUSCULAR; INTRAVENOUS ONCE
Status: COMPLETED | OUTPATIENT
Start: 2020-02-02 | End: 2020-02-02

## 2020-02-02 RX ORDER — FERROUS GLUCONATE 324(37.5)
324 TABLET ORAL 2 TIMES DAILY WITH MEALS
Status: DISCONTINUED | OUTPATIENT
Start: 2020-02-02 | End: 2020-02-06 | Stop reason: HOSPADM

## 2020-02-02 RX ORDER — IPRATROPIUM BROMIDE AND ALBUTEROL SULFATE 2.5; .5 MG/3ML; MG/3ML
3 SOLUTION RESPIRATORY (INHALATION) EVERY 4 HOURS PRN
Status: DISCONTINUED | OUTPATIENT
Start: 2020-02-02 | End: 2020-02-06 | Stop reason: HOSPADM

## 2020-02-02 RX ORDER — MAGNESIUM SULFATE HEPTAHYDRATE 40 MG/ML
2 INJECTION, SOLUTION INTRAVENOUS ONCE
Status: DISCONTINUED | OUTPATIENT
Start: 2020-02-02 | End: 2020-02-02

## 2020-02-02 RX ADMIN — FERROUS GLUCONATE TAB 324 MG (37.5 MG ELEMENTAL IRON) 324 MG: 324 (37.5 FE) TAB at 05:02

## 2020-02-02 RX ADMIN — NITROGLYCERIN 0.4 MG: 0.4 TABLET, ORALLY DISINTEGRATING SUBLINGUAL at 04:02

## 2020-02-02 RX ADMIN — PANTOPRAZOLE SODIUM 40 MG: 40 TABLET, DELAYED RELEASE ORAL at 08:02

## 2020-02-02 RX ADMIN — Medication 1 TABLET: at 08:02

## 2020-02-02 RX ADMIN — DOXYCYCLINE HYCLATE 100 MG: 100 TABLET, COATED ORAL at 08:02

## 2020-02-02 RX ADMIN — CYANOCOBALAMIN 1000 MCG: 1000 INJECTION, SOLUTION INTRAMUSCULAR; SUBCUTANEOUS at 09:02

## 2020-02-02 RX ADMIN — CARVEDILOL 25 MG: 12.5 TABLET, FILM COATED ORAL at 08:02

## 2020-02-02 RX ADMIN — CARVEDILOL 25 MG: 12.5 TABLET, FILM COATED ORAL at 05:02

## 2020-02-02 RX ADMIN — FUROSEMIDE 60 MG: 10 INJECTION, SOLUTION INTRAMUSCULAR; INTRAVENOUS at 05:02

## 2020-02-02 RX ADMIN — ALPRAZOLAM 0.5 MG: 0.5 TABLET ORAL at 05:02

## 2020-02-02 RX ADMIN — FERROUS GLUCONATE TAB 324 MG (37.5 MG ELEMENTAL IRON) 324 MG: 324 (37.5 FE) TAB at 09:02

## 2020-02-02 RX ADMIN — AMLODIPINE BESYLATE 10 MG: 10 TABLET ORAL at 08:02

## 2020-02-02 RX ADMIN — MAGNESIUM SULFATE IN WATER 4 G: 40 INJECTION, SOLUTION INTRAVENOUS at 08:02

## 2020-02-02 RX ADMIN — FUROSEMIDE 60 MG: 10 INJECTION, SOLUTION INTRAMUSCULAR; INTRAVENOUS at 06:02

## 2020-02-02 RX ADMIN — NITROGLYCERIN 0.4 MG: 0.4 TABLET, ORALLY DISINTEGRATING SUBLINGUAL at 03:02

## 2020-02-02 RX ADMIN — DOXYCYCLINE HYCLATE 100 MG: 100 TABLET, COATED ORAL at 10:02

## 2020-02-02 RX ADMIN — IPRATROPIUM BROMIDE AND ALBUTEROL SULFATE 3 ML: .5; 3 SOLUTION RESPIRATORY (INHALATION) at 05:02

## 2020-02-02 RX ADMIN — IPRATROPIUM BROMIDE AND ALBUTEROL SULFATE 3 ML: .5; 3 SOLUTION RESPIRATORY (INHALATION) at 07:02

## 2020-02-02 RX ADMIN — HYDRALAZINE HYDROCHLORIDE 10 MG: 20 INJECTION INTRAMUSCULAR; INTRAVENOUS at 04:02

## 2020-02-02 RX ADMIN — SODIUM BICARBONATE: 84 INJECTION, SOLUTION INTRAVENOUS at 03:02

## 2020-02-02 NOTE — SUBJECTIVE & OBJECTIVE
Interval History:     Last night event  noted. Pt was transferred to ICU overnight due to acute worsening of hypoxia required BiPAP therapy for oxygenation .  IVF discontinued . Pt had received Lasix 60 mg IV x 1 dose   Serum creatinine down to 4.2   Repeat U/S kidneys again showed Left sided hydronephrosis which is unchanged from prior study on admit     CT chest without contrast revealed yary pleural effusion L>R, airspace disease involving yary lung bases , evidence of pulmonary fibrosis , subpleural bulla and centrilobular emphysema     Review of Systems   Constitutional: Negative for activity change, appetite change and fever.   HENT: Negative for sore throat.    Eyes: Negative for visual disturbance.   Respiratory: Positive for shortness of breath. Negative for cough and chest tightness.    Cardiovascular: Negative for chest pain, palpitations and leg swelling.   Gastrointestinal: Negative for abdominal distention, abdominal pain, constipation, diarrhea, nausea and vomiting.   Endocrine: Negative for polyuria.   Genitourinary: Negative for decreased urine volume, dysuria, flank pain, frequency and hematuria.   Musculoskeletal: Negative for back pain and gait problem.   Skin: Negative for rash.   Neurological: Negative for syncope, speech difficulty, weakness, light-headedness and headaches.   Psychiatric/Behavioral: Negative for confusion, hallucinations and sleep disturbance.     Objective:     Vital Signs (Most Recent):  Temp: 98.1 °F (36.7 °C) (02/02/20 1105)  Pulse: 66 (02/02/20 1408)  Resp: 16 (02/02/20 1408)  BP: (!) 137/53 (02/02/20 1305)  SpO2: 96 % (02/02/20 1408) Vital Signs (24h Range):  Temp:  [97.3 °F (36.3 °C)-98.1 °F (36.7 °C)] 98.1 °F (36.7 °C)  Pulse:  [] 66  Resp:  [11-29] 16  SpO2:  [88 %-100 %] 96 %  BP: (101-184)/(48-82) 137/53     Weight: 80.5 kg (177 lb 7.5 oz)  Body mass index is 23.41 kg/m².    Intake/Output Summary (Last 24 hours) at 2/2/2020 7861  Last data filed at 2/2/2020  1300  Gross per 24 hour   Intake 3192.5 ml   Output 1100 ml   Net 2092.5 ml      Physical Exam   Constitutional: He is oriented to person, place, and time. He appears well-developed and well-nourished. No distress.   HENT:   Head: Normocephalic and atraumatic.   Mouth/Throat: No oropharyngeal exudate.   Eyes: Pupils are equal, round, and reactive to light. Conjunctivae and EOM are normal.   Neck: Normal range of motion. Neck supple. No JVD present. No thyromegaly present.   Cardiovascular: Normal rate, regular rhythm and normal heart sounds.   No murmur heard.  Pulmonary/Chest: Effort normal. No respiratory distress. He has no wheezes. He has no rales. He exhibits no tenderness.   Bronchial breath sounds yary  Decreased breath sounds at bases    Abdominal: Soft. Bowel sounds are normal. He exhibits no distension. There is no tenderness. There is no rebound and no guarding.   Musculoskeletal: Normal range of motion. He exhibits no edema.   Lymphadenopathy:     He has no cervical adenopathy.   Neurological: He is alert and oriented to person, place, and time. He displays normal reflexes. No cranial nerve deficit or sensory deficit.   Skin: Skin is warm and dry. No rash noted. He is not diaphoretic.   Psychiatric: He has a normal mood and affect.       Significant Labs:   CBC:   Recent Labs   Lab 02/01/20  0829 02/02/20  0405   WBC 3.42* 3.70*   HGB 8.0* 8.7*   HCT 25.1* 28.3*    201     CMP:   Recent Labs   Lab 02/01/20  0829 02/02/20  0405   * 134*   K 4.7 4.0    101   CO2 18* 20*   GLU 99 96   BUN 58* 48*   CREATININE 5.3* 4.2*   CALCIUM 7.5* 8.0*   ALBUMIN 2.7*  --    ANIONGAP 13 13   EGFRNONAA 10* 13*       Significant Imaging: CT chest   Small right-sided pleural effusion possibly loculated.  Moderate left-sided pleural effusion.  Atelectasis/small consolidations of the lung bases bilaterally.  Diffuse increased interstitial markings throughout the lung fields bilaterally consistent with  pulmonary fibrosis.  Evidence of multiple subpleural bulla.  Regions of centrilobular emphysematous changes.  No abnormal mass.  There is evidence of mediastinal adenopathy with an anterior mediastinal node measuring 1.7 cm in a pretracheal node measuring 1.8 cm.  Scattered areas of ill-defined airspace disease can be seen at the lung bases bilaterally

## 2020-02-02 NOTE — PROGRESS NOTES
Pt removed from BIPAP and placed on 3 lpm NC and tolerating well. Shahriar and RN at bedside. Will continue to follow.

## 2020-02-02 NOTE — PROGRESS NOTES
[facility] -   Nephrology  Progress Note    Patient Name: Kodi Ribeiro  MRN: [id number]  Admission Date: 1/31/2020  Hospital Length of Stay: 2 days  Attending Provider: Markell Magaña MD   Primary Care Physician: Norma Nuñez MD  Principal Problem:Acute renal failure superimposed on chronic kidney disease    Subjective:     HPI: Patient is a 70-year-old male with history of hypertension and chronic kidney disease stage 3/stage IV.  Patient's baseline creatinine has fluctuated in the past with multiple episodes of acute kidney injury last time was seen in the hospital was in April of 2019 with a creatinine went up to 4.8 and at times higher.  Since then patient has been having fluctuations in creatinine up to 2.0.  Patient was recently discharged from the hospital.  Patient comes back with shortness of breath.  Was found to have acute kidney injury on chronic kidney disease with a creatinine up to 6.0.  Patient also found to have a hemoglobin drop down to 7.0 and is receiving 1 unit of blood transfusion with gentle hydration.  Patient also has shortness of breath and congestive heart failure.    Interval History: more SOB and Tx to ICU     Review of patient's allergies indicates:   Allergen Reactions    Morphine Itching     Current Facility-Administered Medications   Medication Frequency    0.9%  NaCl infusion (for blood administration) Q24H PRN    acetaminophen tablet 500 mg Q6H PRN    albuterol-ipratropium 2.5 mg-0.5 mg/3 mL nebulizer solution 3 mL Q4H PRN    amLODIPine tablet 10 mg Daily    carvedilol tablet 25 mg BID WM    dextrose 10% (D10W) Bolus PRN    dextrose 10% (D10W) Bolus PRN    doxycycline tablet 100 mg Q12H    ferrous gluconate tablet 324 mg BID WM    folic acid-vit B6-vit B12 2.5-25-2 mg tablet 1 tablet Daily    glucagon (human recombinant) injection 1 mg PRN    glucose chewable tablet 16 g PRN    glucose chewable tablet 24 g PRN    hydrALAZINE injection 10 mg Q8H PRN     nitroGLYCERIN SL tablet 0.4 mg Q5 Min PRN    pantoprazole EC tablet 40 mg Daily    sodium chloride 0.9% flush 10 mL PRN       Objective:     Vital Signs (Most Recent):  Temp: 98 °F (36.7 °C) (02/02/20 0705)  Pulse: 71 (02/02/20 0944)  Resp: 13 (02/02/20 0944)  BP: (!) 133/55 (02/02/20 0905)  SpO2: 100 % (02/02/20 0944)  O2 Device (Oxygen Therapy): BiPAP (02/02/20 0944) Vital Signs (24h Range):  Temp:  [97.3 °F (36.3 °C)-98.8 °F (37.1 °C)] 98 °F (36.7 °C)  Pulse:  [] 71  Resp:  [12-29] 13  SpO2:  [88 %-100 %] 100 %  BP: (101-184)/(52-82) 133/55     Weight: 80.5 kg (177 lb 7.5 oz) (02/02/20 0400)  Body mass index is 23.41 kg/m².  Body surface area is 2.04 meters squared.    I/O last 3 completed shifts:  In: 5458.8 [P.O.:600; I.V.:4508.8; IV Piggyback:350]  Out: 650 [Urine:650]    Physical Exam   Constitutional: He is oriented to person, place, and time. He appears well-developed and well-nourished.   HENT:   Head: Normocephalic and atraumatic.   Nose: Nose normal.   Eyes: EOM are normal.   PERRL   Neck: Normal range of motion. Neck supple.   Cardiovascular: Normal rate, regular rhythm and normal heart sounds.   Pulmonary/Chest: He is in respiratory distress. He has wheezes. He has rales.   biPEP    Abdominal: Soft. Bowel sounds are normal. He exhibits no distension.   Musculoskeletal: Normal range of motion. He exhibits deformity. He exhibits no edema.   Right BKA   Neurological: He is alert and oriented to person, place, and time. He displays abnormal reflex. A sensory deficit is present. He exhibits abnormal muscle tone. Coordination abnormal.   Skin: Skin is dry. Capillary refill takes 2 to 3 seconds.   Nursing note and vitals reviewed.      Significant Labs:  All labs within the past 24 hours have been reviewed.     Significant Imaging:  Labs: Reviewed  X-Ray: Reviewed  CT: Reviewed    Assessment/Plan:     * Acute renal failure superimposed on chronic kidney disease  Patient's baseline creatinine is  about 2.0 with is about an function of 20-25%.  Patient's creatinine is low due to poor muscle mass and a BKA.  Check cystatin C for accurate GFR once acute kidney function is better. Overnight events noted and d/w dr. Magaña and Dr. Arzate     Ureteral stent retained  Left-sided hydronephrosis : MARCELA is better :repeat USD         Thank you for your consult.     Brenton Jacobson MD  Nephrology  [facility]

## 2020-02-02 NOTE — CONSULTS
[facility] -   Critical Care Medicine  Consult Note    Patient Name: Kodi Ribeiro  MRN: [id number]  Admission Date: 1/31/2020  Hospital Length of Stay: 2 days  Code Status: Full Code  Attending Physician: Markell Magaña MD   Primary Care Provider: Norma Nuñez MD   Principal Problem: Acute renal failure superimposed on chronic kidney disease      Subjective:     HPI:  70 year old male with PMH including HTN, CKD3/4, CAD, PVD, rt LE amputation; SEE full list below  Presented to ED early am 1/31 with acute CP; EMS called after no relief with nitro  ED evaluation revealed significant anemia hgb 6.9; creatinine elevation 6.6; potassium 5.4; ; BNP 1253  Admitted for management of MARCELA, anemia    Hospital/ICU Course:  Overnight SOB, hypoxemia, tachycardia on getting up to BSC prompted ICU transfer and NIPPV  On exam this afternoon he is AAO and comfortable on NIPPV, wants to try removal of NIPPV    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    Late complications of amputation stump     rseolved with further amputation( MRSA then none since 2014)    Lipoma of colon     colonoscopy 2/21/2014    Myocardial infarction     per patient 2000 & 9/2012    Peripheral vascular disease     Phantom limb syndrome     patient reports only intermittent not problematic, not worsening    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    Spinal cord  disease     L4L5 disc    Stroke     Tobacco dependence     resolved    Ureteral stent retained        Past Surgical History:   Procedure Laterality Date    ABDOMINAL AORTIC ANEURYSM REPAIR      ABDOMINAL AORTIC ANEURYSM REPAIR  1996/2014    AMPUTATION, LOWER LIMB      AORTA - BILATERAL FEMORAL ARTERY BYPASS GRAFT  2014    Left and right leg    CORONARY ANGIOPLASTY WITH STENT PLACEMENT  2000    Three placed in heart    CYSTOSCOPY W/ RETROGRADES Left 5/29/2018    Procedure: CYSTOSCOPY, WITH RETROGRADE PYELOGRAM;  Surgeon: Scooter Jin IV, MD;  Location: [facility] OR;  Service: Urology;  Laterality: Left;    CYSTOSCOPY W/ URETERAL STENT PLACEMENT Left 5/29/2018    Procedure: CYSTOSCOPY, WITH URETERAL STENT INSERTION;  Surgeon: Scooter Jni IV, MD;  Location: [facility] OR;  Service: Urology;  Laterality: Left;    CYSTOSCOPY W/ URETERAL STENT REMOVAL Left 5/29/2018    Procedure: CYSTOSCOPY, WITH URETERAL STENT REMOVAL;  Surgeon: Scooetr Jin IV, MD;  Location: [facility] OR;  Service: Urology;  Laterality: Left;    FOOT AMPUTATION THROUGH METATARSAL  1996    left    FOOT SURGERY Bilateral 1980's    per patient multiple toe amputations prior to.  partial foot amputation:first great toe then other toes     KIDNEY SURGERY  2014    per patient separation of horseshoe kidney @ time of AAA repair    LEFT HEART CATHETERIZATION Left 3/7/2019    Procedure: CATHETERIZATION, HEART, LEFT;  Surgeon: Adriel Boone MD;  Location: [facility] CATH LAB;  Service: Cardiology;  Laterality: Left;  630 admit for IV hydration  10am start    LUNG LOBECTOMY Right 1970s    per patient not cancer    right below knee amputation  2009 (approx)    SMALL INTESTINE SURGERY  2014    per patient partial @ time of aaa repair  not small bowel - large bowel bowel compromised bythtwe AAAbowel    TONSILLECTOMY  1955 aprox    URETERAL STENT PLACEMENT Left     annually replaced since 2012 or so  Dr Jin       Review of patient's allergies  indicates:   Allergen Reactions    Morphine Itching       Family History     Problem Relation (Age of Onset)    COPD Mother    Cancer Mother    Diabetes Daughter    Heart disease Father        Tobacco Use    Smoking status: Former Smoker     Packs/day: 1.00     Years: 15.00     Pack years: 15.00     Last attempt to quit: 2009     Years since quittin.0    Smokeless tobacco: Never Used   Substance and Sexual Activity    Alcohol use: No    Drug use: No     Comment: Is on prescription opiod, no non prescribed use    Sexual activity: Not Currently     Partners: Female         Review of Systems   Constitutional: Positive for appetite change and fatigue. Negative for chills, diaphoresis and fever.   Respiratory: Positive for shortness of breath. Negative for apnea and chest tightness.    Cardiovascular: Negative for chest pain and leg swelling.   Gastrointestinal: Negative for nausea and vomiting.   Neurological: Negative for tremors and speech difficulty.   Hematological: Bruises/bleeds easily.   Psychiatric/Behavioral: The patient is not nervous/anxious.      Objective:     Vital Signs (Most Recent):  Temp: 98.1 °F (36.7 °C) (20 1105)  Pulse: 66 (20 1408)  Resp: 16 (20 1408)  BP: (!) 137/53 (20 1305)  SpO2: 96 % (20 1408) Vital Signs (24h Range):  Temp:  [97.3 °F (36.3 °C)-98.1 °F (36.7 °C)] 98.1 °F (36.7 °C)  Pulse:  [] 66  Resp:  [11-29] 16  SpO2:  [88 %-100 %] 96 %  BP: (101-184)/(48-82) 137/53     Weight: 80.5 kg (177 lb 7.5 oz)  Body mass index is 23.41 kg/m².      Intake/Output Summary (Last 24 hours) at 2020 1440  Last data filed at 2020 1300  Gross per 24 hour   Intake 3192.5 ml   Output 1250 ml   Net 1942.5 ml       Physical Exam   Constitutional: He is oriented to person, place, and time. He is cooperative. He appears ill. No distress. Face mask in place.   HENT:   Head: Atraumatic.   Eyes: Pupils are equal, round, and reactive to light. Conjunctivae  are normal.   Neck: No JVD present.   Cardiovascular: Normal rate and regular rhythm.   Murmur heard.   Systolic murmur is present.  Pulses:       Radial pulses are 2+ on the right side, and 2+ on the left side.   Pulmonary/Chest: No accessory muscle usage. No tachypnea. No respiratory distress. He has no wheezes. He has no rhonchi. He has rales in the left upper field.   Abdominal: Soft. Bowel sounds are normal. He exhibits no distension. There is no tenderness.   Musculoskeletal: He exhibits no edema.   Feet:   Right Foot: amputated  Left Foot:   Skin Integrity: Positive for ulcer.   Neurological: He is alert and oriented to person, place, and time.   Skin: Skin is warm and dry. Capillary refill takes less than 2 seconds. No cyanosis.   Vitals reviewed.      Vents:  Oxygen Concentration (%): 32 (02/02/20 1408)    Lines/Drains/Airways     Peripheral Intravenous Line                 Peripheral IV - Single Lumen 01/31/20 18 G Left Antecubital 2 days         Peripheral IV - Single Lumen 01/31/20 1923 22 G Right Wrist 1 day                Significant Labs:    CBC/Anemia Profile:  Recent Labs   Lab 01/31/20  1444 02/01/20  0829 02/01/20  1330 02/02/20  0405   WBC  --  3.42*  --  3.70*   HGB 7.8* 8.0*  --  8.7*   HCT 25.6* 25.1*  --  28.3*   PLT  --  186  --  201   MCV  --  88  --  88   RDW  --  15.4*  --  15.5*   IRON  --   --  18*  --    FERRITIN  --   --  311*  --    FOLATE  --   --  2.3*  --    [id number]  --   --  255  --         Chemistries:  Recent Labs   Lab 02/01/20  0829 02/02/20  0405   * 134*   K 4.7 4.0    101   CO2 18* 20*   BUN 58* 48*   CREATININE 5.3* 4.2*   CALCIUM 7.5* 8.0*   ALBUMIN 2.7*  --    MG  --  1.2*   PHOS 4.8*  --        All pertinent labs within the past 24 hours have been reviewed.    Significant Imaging:   I have reviewed all pertinent imaging results/findings within the past 24 hours.      ABG  Recent Labs   Lab 02/02/20  0829   PH 7.396   PO2 63*   PCO2 34.2*   HCO3 21.0*    BE -4     Assessment/Plan:     Derm  Foot ulcer - Left Foot  Evaluated by podiatry  Scans indicate chronic osteo not active  abx de-escalated to oral doxy  Wound care per recs    Pulmonary  Acute respiratory failure with hypoxia  Tenuous volume status with CHF and acute on chronic kidney disease; IVF stopped on transfer and diuretic administered  On NIPPV throughout day, trial off now and if tolerates can have po intake; make NIPPV prn for nocturnal dyspnea  CT chest with some left pleural effusion noted, reviewed by pulmonologist, will monitor  Chronic lung findings - scarring and fibrosis noted, discussed with spouse/pt  Supplemental oxygen prn to keep sat > 92    Renal/  * Acute renal failure superimposed on chronic kidney disease  Nephrology following  Has improved some post transfusion and hydration; care now complicated with SOB, exacerbation of CHF after fluids  Strict I/O  Monitor creatinine  Avoid nephrotoxins    Hydronephrosis of left kidney  Not felt to be acute cause of MARCELA  Likely new ureteral stent at some point  If acute issues, consider percutaneous nephrostomy tube    Other  Acute on chronic anemia  H/h stable post transfusion  Monitor trend  Anticipate outpatient follow up, monitoring, and treatment        Critical Care Daily Checklist:    A: Awake: RASS Goal/Actual Goal:    Actual: Lott Agitation Sedation Scale (RASS): Alert and calm   B: Spontaneous Breathing Trial Performed?     C: SAT & SBT Coordinated?  n/a                      D: Delirium: CAM-ICU Overall CAM-ICU: Negative   E: Early Mobility Performed? Yes   F: Feeding Goal:    Status:     Current Diet Order   Procedures    Diet NPO Except for: Sips with Medication     Order Specific Question:   Except for     Answer:   Sips with Medication    Diet renal      AS: Analgesia/Sedation prn   T: Thromboembolic Prophylaxis Holding anticoagulation for anemia, potential procedure   H: HOB > 300 Yes   U: Stress Ulcer Prophylaxis (if needed)  PPI   G: Glucose Control monitoring   B: Bowel Function Stool Occurrence: 1   I: Indwelling Catheter (Lines & Loza) Necessity reviewed   D: De-escalation of Antimicrobials/Pharmacotherapies reviewed    Plan for the day/ETD As above    Code Status:  Family/Goals of Care: Full Code  Home on discharge   I have discussed case and plan of care in detail with Dr Arzate; Status and plan of care were discussed with team on multidisciplinary rounds.    Critical Care Time: 45 minutes  Critical secondary to hypoxemia requiring NIPPV; Critical care was time spent personally by me on the following activities: development of treatment plan with patient or surrogate and bedside caregivers, discussions with consultants, evaluation of patient's response to treatment, examination of patient, ordering and performing treatments and interventions, ordering and review of laboratory studies, ordering and review of radiographic studies, pulse oximetry, re-evaluation of patient's condition. This critical care time did not overlap with that of any other provider or involve time for any procedures.    Thank you for your consult.      EBER Pro-BC  Critical Care Medicine  [facility]

## 2020-02-02 NOTE — ASSESSMENT & PLAN NOTE
Will transfuse one unit of packed cell-monitor cbc in AM    2/1  Get iron study , B12, Folic  acid.

## 2020-02-02 NOTE — ASSESSMENT & PLAN NOTE
Tenuous volume status with CHF and acute on chronic kidney disease; IVF stopped on transfer and diuretic administered  On NIPPV throughout day, trial off now and if tolerates can have po intake; make NIPPV prn for nocturnal dyspnea  CT chest with some left pleural effusion noted, reviewed by pulmonologist, will monitor  Chronic lung findings - scarring and fibrosis noted, discussed with spouse/pt  Supplemental oxygen prn to keep sat > 92

## 2020-02-02 NOTE — ASSESSMENT & PLAN NOTE
Will do renal ultrasound,start gentle hydration, nephrology consult, check renal function in AM     2/1  Nephrology consult obtained   Continue gentle hydration   Serum creatinine is trending down slowly   Monitor volume status   H/O Left ureteral stricture and stent , now presenting with left hydronephrosis . Pt will have left ureteral stent change possibly on Monday 2/3/20 by

## 2020-02-02 NOTE — PLAN OF CARE
Pt on BIPAP and tolerating well. Mask changed to medium. fio2 weaned at this time. Pt appears comfortable. Will continue to wean as tolerated.

## 2020-02-02 NOTE — PROGRESS NOTES
[facility] Hospital Medicine  Progress Note    Patient Name: Kodi Ribeiro  MRN: [id number]  Patient Class: IP- Inpatient   Admission Date: 1/31/2020  Length of Stay: 2 days  Attending Physician: Markell Magaña MD  Primary Care Provider: Norma Nuñez MD        Subjective:     Principal Problem:Acute renal failure superimposed on chronic kidney disease        HPI:   69 year old man with extensive past medical and surgery history of    stroke, spinal cord disease, peripheral vascular disease, MI (per patient, 2000 & 2012), lipoma of colon, infection of aortic graft, HTN, HLD, hyperglycemia, horseshoe kidney, hemorrhoids, GERD. He was recently discharged from the Hospital on  1/15/2020. He presented at this time with chest pain that started few hours prior to presentation.Pain was described a sharp ,non radiating ,not associated with diaphoresis and worsened with exertion. He called his wife and EMS was called.   Since admission, Lab data showed   Component      Latest Ref Rng & Units 1/31/2020 1/30/2020 1/15/2020               Hemoglobin      14.0 - 18.0 g/dL 6.9 (L) 7.0 (L) 7.7 (L)     Component      Latest Ref Rng & Units 1/31/2020 1/30/2020 1/15/2020               Creatinine      0.5 - 1.4 mg/dL 6.6 (H) 6.0 (H) 2.4 (H)   He also reported increased pain from the left foot ulceration and when the dressing was opened-sánchez pus was seen .  He will be admitted for symptomatic anemia and acute renal failure .    Overview/Hospital Course:  1/31-  Severe left sided hydronephrosis with ureteral stent visualized. In setting of acute renal failure concern with this being cause. This was discussed with Dr. Brennen Maldonado who feels that hydronephrosis is a chronic issue and not sole cause of acute renal failure. Patient already has stent and may need IR consult for nephrostomy tube placement. Will consult IR on case. Attempted to contact patient's urologist earlier today Dr. Jin, left voicemail. Will  dc asa and plavix due to pending procedure. Currently receiving 1 unit prbc. Will transfuse < 7.      Discussed case with Dr. Jin. Concerned with outlet obstruction. Recommended getting bladder scan which was negative. Recommendations were to workup other causes of renal failure as left sided hydronephrosis alone should not cause acute renal failure with the right kidney working.      Started sterile water with 150meq bicarb this am. Cont to monitor urine output. Fena calculated was 2.2% indicating intrinsic renal disease.      Case was discussed with IR earlier. Concerned with bleeding due to asa and plavix use. Does not recommend nephrostomy tube at this time due to risks. If any procedure needs to be done, recommended ureteral stent changed instead as it is less invasive.     2/1  Pt has no new C/O today . SOB is at baseline . On O2 at 2L/min. BNP is elevated noted .  Continued on gentle hydration with Bicarb . Serum creatinine is slightly better 6.6>5.3   Off of diuretics , ASA and Plavix  Possible ureteral stent change on Monday by  (Urologist)     S/P 1 unit of P-RBC yesterday  . Pt has H/O   chronic iron def anemia and is followed in the Hematology clinic on a regular basis . Baseline Hemoglobin ranges 8 to 9.   Bone scan suspicious for left foot osteomyelitis possibly chronic and does not appear an active issue at this time. Podiatry consult obtained and plan is noted   Will D/C broad spectrum antibiotic . Pt does have left foot ulceration and will place pt on Oral Doxycycline for 5 to  7 days.       2/2-  Last night event  noted. Pt was transferred to ICU overnight due to acute worsening of hypoxia required BiPAP therapy for oxygenation .  IVF discontinued . Pt had received Lasix 60 mg IV x 1 dose   Serum creatinine down to 4.2   Repeat U/S kidneys again showed Left sided hydronephrosis which is unchanged from prior study on admit     CT chest without contrast revealed yary pleural effusion  L>R, airspace disease involving yary lung bases , evidence of pulmonary fibrosis , subpleural bulla and centrilobular emphysema     Interval History:     Last night event  noted. Pt was transferred to ICU overnight due to acute worsening of hypoxia required BiPAP therapy for oxygenation .  IVF discontinued . Pt had received Lasix 60 mg IV x 1 dose   Serum creatinine down to 4.2   Repeat U/S kidneys again showed Left sided hydronephrosis which is unchanged from prior study on admit     CT chest without contrast revealed yary pleural effusion L>R, airspace disease involving yary lung bases , evidence of pulmonary fibrosis , subpleural bulla and centrilobular emphysema     Review of Systems   Constitutional: Negative for activity change, appetite change and fever.   HENT: Negative for sore throat.    Eyes: Negative for visual disturbance.   Respiratory: Positive for shortness of breath. Negative for cough and chest tightness.    Cardiovascular: Negative for chest pain, palpitations and leg swelling.   Gastrointestinal: Negative for abdominal distention, abdominal pain, constipation, diarrhea, nausea and vomiting.   Endocrine: Negative for polyuria.   Genitourinary: Negative for decreased urine volume, dysuria, flank pain, frequency and hematuria.   Musculoskeletal: Negative for back pain and gait problem.   Skin: Negative for rash.   Neurological: Negative for syncope, speech difficulty, weakness, light-headedness and headaches.   Psychiatric/Behavioral: Negative for confusion, hallucinations and sleep disturbance.     Objective:     Vital Signs (Most Recent):  Temp: 98.1 °F (36.7 °C) (02/02/20 1105)  Pulse: 66 (02/02/20 1408)  Resp: 16 (02/02/20 1408)  BP: (!) 137/53 (02/02/20 1305)  SpO2: 96 % (02/02/20 1408) Vital Signs (24h Range):  Temp:  [97.3 °F (36.3 °C)-98.1 °F (36.7 °C)] 98.1 °F (36.7 °C)  Pulse:  [] 66  Resp:  [11-29] 16  SpO2:  [88 %-100 %] 96 %  BP: (101-184)/(48-82) 137/53     Weight: 80.5 kg (177 lb  7.5 oz)  Body mass index is 23.41 kg/m².    Intake/Output Summary (Last 24 hours) at 2/2/2020 1456  Last data filed at 2/2/2020 1300  Gross per 24 hour   Intake 3192.5 ml   Output 1100 ml   Net 2092.5 ml      Physical Exam   Constitutional: He is oriented to person, place, and time. He appears well-developed and well-nourished. No distress.   HENT:   Head: Normocephalic and atraumatic.   Mouth/Throat: No oropharyngeal exudate.   Eyes: Pupils are equal, round, and reactive to light. Conjunctivae and EOM are normal.   Neck: Normal range of motion. Neck supple. No JVD present. No thyromegaly present.   Cardiovascular: Normal rate, regular rhythm and normal heart sounds.   No murmur heard.  Pulmonary/Chest: Effort normal. No respiratory distress. He has no wheezes. He has no rales. He exhibits no tenderness.   Bronchial breath sounds yary  Decreased breath sounds at bases    Abdominal: Soft. Bowel sounds are normal. He exhibits no distension. There is no tenderness. There is no rebound and no guarding.   Musculoskeletal: Normal range of motion. He exhibits no edema.   Lymphadenopathy:     He has no cervical adenopathy.   Neurological: He is alert and oriented to person, place, and time. He displays normal reflexes. No cranial nerve deficit or sensory deficit.   Skin: Skin is warm and dry. No rash noted. He is not diaphoretic.   Psychiatric: He has a normal mood and affect.       Significant Labs:   CBC:   Recent Labs   Lab 02/01/20  0829 02/02/20  0405   WBC 3.42* 3.70*   HGB 8.0* 8.7*   HCT 25.1* 28.3*    201     CMP:   Recent Labs   Lab 02/01/20  0829 02/02/20  0405   * 134*   K 4.7 4.0    101   CO2 18* 20*   GLU 99 96   BUN 58* 48*   CREATININE 5.3* 4.2*   CALCIUM 7.5* 8.0*   ALBUMIN 2.7*  --    ANIONGAP 13 13   EGFRNONAA 10* 13*       Significant Imaging: CT chest   Small right-sided pleural effusion possibly loculated.  Moderate left-sided pleural effusion.  Atelectasis/small consolidations of the  lung bases bilaterally.  Diffuse increased interstitial markings throughout the lung fields bilaterally consistent with pulmonary fibrosis.  Evidence of multiple subpleural bulla.  Regions of centrilobular emphysematous changes.  No abnormal mass.  There is evidence of mediastinal adenopathy with an anterior mediastinal node measuring 1.7 cm in a pretracheal node measuring 1.8 cm.  Scattered areas of ill-defined airspace disease can be seen at the lung bases bilaterally      Assessment/Plan:      * Acute renal failure superimposed on chronic kidney disease    Will do renal ultrasound,start gentle hydration, nephrology consult, check renal function in AM     2/1  Nephrology consult obtained   Continue gentle hydration   Serum creatinine is trending down slowly   Monitor volume status   H/O Left ureteral stricture and stent , now presenting with left hydronephrosis . Pt will have left ureteral stent change possibly on Monday 2/3/20 by    2/2  IVF discontinued due to acute exacerbation of diastolic CHF  Monitor volume status   Monitor Renal indices     Acute respiratory failure with hypoxia  2/2  Overnight pt developed worsening SOB and hypoxia likely due to acute exacerbation of Chronic diastolic HF  IVF discontinued   Pt had received Lasix 60 mg IV x 1 dose   Monitor volume status   Monitor renal indices   Wean FiO2 as tolerated   CT chest revealed yary pleural effusion L>R, airspace disease involving yary lung bases , evidence of pulmonary fibrosis , subpleural bulla and centrilobular emphysema    Bronchodilator treatment        Hydronephrosis of left kidney  -H/O Left ureteral stricture and ureteral stent . Will have left ureteral stent change possibly on Monday by        Acute on chronic anemia    Will transfuse one unit of packed cell-monitor cbc in AM    2/1  Get iron study , B12, Folic  acid.   2/2  Low B12, Folic acid and low iron noted .Repalce b12, folic acid and iron orally     Acute  on chronic diastolic heart failure  2/2  Acute decompensation noted overnight   IVF discontinued   Pt had received Lasix 60 mg IV x 1 dose   Clinical improvement noted with diuresis   Monitor volume status   Monitor renal indices       CKD (chronic kidney disease) stage 3, GFR 30-59 ml/min        PVD (peripheral vascular disease)        Foot ulcer - Left Foot    Will do wound culture , podiatry consult -will need to rule out osteomyelitis , will do bone scan in AM   WILL start Rocephin/zyvox  2/1  D/C Rocephin and Zyvox   Start oral Doxycycline 100 mg po   2/2  As above     Essential hypertension    Will continue home medication of norvasc    Ureteral stent retained          VTE Risk Mitigation (From admission, onward)         Ordered     Reason for No Pharmacological VTE Prophylaxis  Once     Question:  Reasons:  Answer:  Physician Provided (leave comment)    01/31/20 0317     IP VTE HIGH RISK PATIENT  Once      01/31/20 0317                Critical care time spent on the evaluation and treatment of severe organ dysfunction, review of pertinent labs and imaging studies, discussions with consulting providers and discussions with patient/family: 30  minutes.      Markell Magaña MD  Department of Hospital Medicine   [facility]

## 2020-02-02 NOTE — NURSING TRANSFER
Nursing Transfer Note      2/2/2020     Transfer ICU ROOM 8/TELEMETRY    Transfer via STRETCHER    Transfer with TRANSFER WITH     Transported by [facility]BIN GRANADOS, RESPIRATORY    Medicines sent:  NONE    Chart send with patient: YES     Notified: WIFE    Patient reassessed at: 2/2/2020, 0540     Upon arrival to floor:

## 2020-02-02 NOTE — HPI
70 year old male with PMH including HTN, CKD3/4, CAD, PVD, rt LE amputation; SEE full list below  Presented to ED early am 1/31 with acute CP; EMS called after no relief with nitro  ED evaluation revealed significant anemia hgb 6.9; creatinine elevation 6.6; potassium 5.4; ; BNP 1253   Admitted for management of MARCELA, anemia

## 2020-02-02 NOTE — SIGNIFICANT EVENT
Patient Deterioration Alert     Deterioration alert received.  Please see Significant Event note timed at 0500 this AM.          Coni Salgado NP

## 2020-02-02 NOTE — ASSESSMENT & PLAN NOTE
2/2  Acute decompensation noted overnight   IVF discontinued   Pt had received Lasix 60 mg IV x 1 dose   Clinical improvement noted with diuresis   Monitor volume status   Monitor renal indices

## 2020-02-02 NOTE — ASSESSMENT & PLAN NOTE
Patient's baseline creatinine is about 2.0 with is about an function of 20-25%.  Patient's creatinine is low due to poor muscle mass and a BKA.  Check cystatin C for accurate GFR once acute kidney function is better. Overnight events noted and d/w dr. Magaña and Dr. Arzate

## 2020-02-02 NOTE — HOSPITAL COURSE
Overnight SOB, hypoxemia, tachycardia on getting up to BSC prompted ICU transfer and NIPPV  On exam this afternoon he is AAO and comfortable on NIPPV, wants to try removal of NIPPV   02/03/20: Seen and examined at bedside. Hospital chart reviewed. No acute interval detrimental events noted. He reports that he  has moderately improved. Off NIV.

## 2020-02-02 NOTE — SUBJECTIVE & OBJECTIVE
Interval History: more SOB and Tx to ICU     Review of patient's allergies indicates:   Allergen Reactions    Morphine Itching     Current Facility-Administered Medications   Medication Frequency    0.9%  NaCl infusion (for blood administration) Q24H PRN    acetaminophen tablet 500 mg Q6H PRN    albuterol-ipratropium 2.5 mg-0.5 mg/3 mL nebulizer solution 3 mL Q4H PRN    amLODIPine tablet 10 mg Daily    carvedilol tablet 25 mg BID WM    dextrose 10% (D10W) Bolus PRN    dextrose 10% (D10W) Bolus PRN    doxycycline tablet 100 mg Q12H    ferrous gluconate tablet 324 mg BID WM    folic acid-vit B6-vit B12 2.5-25-2 mg tablet 1 tablet Daily    glucagon (human recombinant) injection 1 mg PRN    glucose chewable tablet 16 g PRN    glucose chewable tablet 24 g PRN    hydrALAZINE injection 10 mg Q8H PRN    nitroGLYCERIN SL tablet 0.4 mg Q5 Min PRN    pantoprazole EC tablet 40 mg Daily    sodium chloride 0.9% flush 10 mL PRN       Objective:     Vital Signs (Most Recent):  Temp: 98 °F (36.7 °C) (02/02/20 0705)  Pulse: 71 (02/02/20 0944)  Resp: 13 (02/02/20 0944)  BP: (!) 133/55 (02/02/20 0905)  SpO2: 100 % (02/02/20 0944)  O2 Device (Oxygen Therapy): BiPAP (02/02/20 0944) Vital Signs (24h Range):  Temp:  [97.3 °F (36.3 °C)-98.8 °F (37.1 °C)] 98 °F (36.7 °C)  Pulse:  [] 71  Resp:  [12-29] 13  SpO2:  [88 %-100 %] 100 %  BP: (101-184)/(52-82) 133/55     Weight: 80.5 kg (177 lb 7.5 oz) (02/02/20 0400)  Body mass index is 23.41 kg/m².  Body surface area is 2.04 meters squared.    I/O last 3 completed shifts:  In: 5458.8 [P.O.:600; I.V.:4508.8; IV Piggyback:350]  Out: 650 [Urine:650]    Physical Exam   Constitutional: He is oriented to person, place, and time. He appears well-developed and well-nourished.   HENT:   Head: Normocephalic and atraumatic.   Nose: Nose normal.   Eyes: EOM are normal.   PERRL   Neck: Normal range of motion. Neck supple.   Cardiovascular: Normal rate, regular rhythm and normal heart  sounds.   Pulmonary/Chest: He is in respiratory distress. He has wheezes. He has rales.   biPEP    Abdominal: Soft. Bowel sounds are normal. He exhibits no distension.   Musculoskeletal: Normal range of motion. He exhibits deformity. He exhibits no edema.   Right BKA   Neurological: He is alert and oriented to person, place, and time. He displays abnormal reflex. A sensory deficit is present. He exhibits abnormal muscle tone. Coordination abnormal.   Skin: Skin is dry. Capillary refill takes 2 to 3 seconds.   Nursing note and vitals reviewed.      Significant Labs:  All labs within the past 24 hours have been reviewed.     Significant Imaging:  Labs: Reviewed  X-Ray: Reviewed  CT: Reviewed

## 2020-02-02 NOTE — PLAN OF CARE
Pt AAOx3. NSR on monitor. 3L O2 NC. BIPAP QHS. BP stable. Afebrile. Tolerating renal diet. NPO after midnight for uretal stent replacement. Voids spontaneously. Pt turns/repositions independently. Skin: RBKA (old), L foot toes amputated (healing). PIV intact. Bed low, wheels locked, alarms audible, call light within reach. POC reviewed with pt and family.

## 2020-02-02 NOTE — PLAN OF CARE
Pt AAO x4.  VSS  Pt able to make needs known.  Pt remained afebrile throughout this shift.   Pt right BKA, transfers with assist.   Pt remained free of falls this shift.   Pt denies pain this shift.  Plan of care reviewed.   Patient verbalizes understanding.   Pt moving/turing independent.   Frequent weight shifting encouraged.  Patient sinus rhythm on monitor.   Bed low, side rails up x 2, wheels locked, call light in reach.   Will continue to monitor.

## 2020-02-02 NOTE — ASSESSMENT & PLAN NOTE
Evaluated by podiatry  Scans indicate chronic osteo not active  abx de-escalated to oral doxy  Wound care per recs

## 2020-02-02 NOTE — SIGNIFICANT EVENT
Patient developed acute onset SOB while transferring to the bedside commode.  Patient hypoxic with O2 sat in low-mid 80s, tachypneic (RR mid-high 20s), and tachycardic (HR in 120s).  Patient seen and examined.  He c/o severe SOB.  Patient in notable distress and anxious, lung sounds wet, increased WOB.  STAT CXR, EKG, and ABG ordered.  Bicarb IV fluids stopped and 60 mg IV Lasix ordered.  Patient placed on BiPAP, which improved O2 sat to 97%.  Will transfer to ICU for close monitoring.          Coni Salgado, NP

## 2020-02-02 NOTE — ASSESSMENT & PLAN NOTE
Will do renal ultrasound,start gentle hydration, nephrology consult, check renal function in AM     2/1  Nephrology consult obtained   Continue gentle hydration   Serum creatinine is trending down slowly   Monitor volume status   H/O Left ureteral stricture and stent , now presenting with left hydronephrosis . Pt will have left ureteral stent change possibly on Monday 2/3/20 by    2/2  IVF discontinued due to acute exacerbation of diastolic CHF  Monitor volume status   Monitor Renal indices

## 2020-02-02 NOTE — SUBJECTIVE & OBJECTIVE
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    Late complications of amputation stump     rseolved with further amputation( MRSA then none since 2014)    Lipoma of colon     colonoscopy 2/21/2014    Myocardial infarction     per patient 2000 & 9/2012    Peripheral vascular disease     Phantom limb syndrome     patient reports only intermittent not problematic, not worsening    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    Spinal cord disease     L4L5 disc    Stroke     Tobacco dependence     resolved    Ureteral stent retained        Past Surgical History:   Procedure Laterality Date    ABDOMINAL AORTIC ANEURYSM REPAIR      ABDOMINAL AORTIC ANEURYSM REPAIR  1996/2014    AMPUTATION, LOWER LIMB      AORTA - BILATERAL FEMORAL ARTERY BYPASS GRAFT  2014    Left and right leg    CORONARY ANGIOPLASTY WITH STENT PLACEMENT  2000    Three placed in heart    CYSTOSCOPY W/ RETROGRADES Left 5/29/2018    Procedure: CYSTOSCOPY, WITH RETROGRADE PYELOGRAM;  Surgeon: Scooter Jin IV, MD;  Location: [facility] OR;  Service: Urology;  Laterality: Left;    CYSTOSCOPY W/ URETERAL STENT PLACEMENT Left 5/29/2018    Procedure: CYSTOSCOPY, WITH URETERAL STENT INSERTION;  Surgeon: Scooter Jin IV, MD;  Location: [facility] OR;  Service: Urology;  Laterality: Left;    CYSTOSCOPY W/ URETERAL STENT REMOVAL Left 5/29/2018    Procedure: CYSTOSCOPY, WITH URETERAL STENT  REMOVAL;  Surgeon: Scooter Jin IV, MD;  Location: [facility] OR;  Service: Urology;  Laterality: Left;    FOOT AMPUTATION THROUGH METATARSAL      left    FOOT SURGERY Bilateral     per patient multiple toe amputations prior to.  partial foot amputation:first great toe then other toes     KIDNEY SURGERY      per patient separation of horseshoe kidney @ time of AAA repair    LEFT HEART CATHETERIZATION Left 3/7/2019    Procedure: CATHETERIZATION, HEART, LEFT;  Surgeon: Adriel Boone MD;  Location: [facility] CATH LAB;  Service: Cardiology;  Laterality: Left;  630 admit for IV hydration  10am start    LUNG LOBECTOMY Right     per patient not cancer    right below knee amputation   (approx)    SMALL INTESTINE SURGERY      per patient partial @ time of aaa repair  not small bowel - large bowel bowel compromised bythtwe AAAbowel    TONSILLECTOMY   aprox    URETERAL STENT PLACEMENT Left     annually replaced since  or so  Dr Jin       Review of patient's allergies indicates:   Allergen Reactions    Morphine Itching       Family History     Problem Relation (Age of Onset)    COPD Mother    Cancer Mother    Diabetes Daughter    Heart disease Father        Tobacco Use    Smoking status: Former Smoker     Packs/day: 1.00     Years: 15.00     Pack years: 15.00     Last attempt to quit: 2009     Years since quittin.0    Smokeless tobacco: Never Used   Substance and Sexual Activity    Alcohol use: No    Drug use: No     Comment: Is on prescription opiod, no non prescribed use    Sexual activity: Not Currently     Partners: Female         Review of Systems   Constitutional: Positive for appetite change and fatigue. Negative for chills, diaphoresis and fever.   Respiratory: Positive for shortness of breath. Negative for apnea and chest tightness.    Cardiovascular: Negative for chest pain and leg swelling.   Gastrointestinal: Negative for nausea and vomiting.    Neurological: Negative for tremors and speech difficulty.   Hematological: Bruises/bleeds easily.   Psychiatric/Behavioral: The patient is not nervous/anxious.      Objective:     Vital Signs (Most Recent):  Temp: 98.1 °F (36.7 °C) (02/02/20 1105)  Pulse: 66 (02/02/20 1408)  Resp: 16 (02/02/20 1408)  BP: (!) 137/53 (02/02/20 1305)  SpO2: 96 % (02/02/20 1408) Vital Signs (24h Range):  Temp:  [97.3 °F (36.3 °C)-98.1 °F (36.7 °C)] 98.1 °F (36.7 °C)  Pulse:  [] 66  Resp:  [11-29] 16  SpO2:  [88 %-100 %] 96 %  BP: (101-184)/(48-82) 137/53     Weight: 80.5 kg (177 lb 7.5 oz)  Body mass index is 23.41 kg/m².      Intake/Output Summary (Last 24 hours) at 2/2/2020 1440  Last data filed at 2/2/2020 1300  Gross per 24 hour   Intake 3192.5 ml   Output 1250 ml   Net 1942.5 ml       Physical Exam   Constitutional: He is oriented to person, place, and time. He is cooperative. He appears ill. No distress. Face mask in place.   HENT:   Head: Atraumatic.   Eyes: Pupils are equal, round, and reactive to light. Conjunctivae are normal.   Neck: No JVD present.   Cardiovascular: Normal rate and regular rhythm.   Murmur heard.   Systolic murmur is present.  Pulses:       Radial pulses are 2+ on the right side, and 2+ on the left side.   Pulmonary/Chest: No accessory muscle usage. No tachypnea. No respiratory distress. He has no wheezes. He has no rhonchi. He has rales in the left upper field.   Abdominal: Soft. Bowel sounds are normal. He exhibits no distension. There is no tenderness.   Musculoskeletal: He exhibits no edema.   Feet:   Right Foot: amputated  Left Foot:   Skin Integrity: Positive for ulcer.   Neurological: He is alert and oriented to person, place, and time.   Skin: Skin is warm and dry. Capillary refill takes less than 2 seconds. No cyanosis.   Vitals reviewed.      Vents:  Oxygen Concentration (%): 32 (02/02/20 1408)    Lines/Drains/Airways     Peripheral Intravenous Line                 Peripheral IV - Single  Lumen 01/31/20 18 G Left Antecubital 2 days         Peripheral IV - Single Lumen 01/31/20 1923 22 G Right Wrist 1 day                Significant Labs:    CBC/Anemia Profile:  Recent Labs   Lab 01/31/20  1444 02/01/20  0829 02/01/20  1330 02/02/20  0405   WBC  --  3.42*  --  3.70*   HGB 7.8* 8.0*  --  8.7*   HCT 25.6* 25.1*  --  28.3*   PLT  --  186  --  201   MCV  --  88  --  88   RDW  --  15.4*  --  15.5*   IRON  --   --  18*  --    FERRITIN  --   --  311*  --    FOLATE  --   --  2.3*  --    [id number]  --   --  255  --         Chemistries:  Recent Labs   Lab 02/01/20  0829 02/02/20  0405   * 134*   K 4.7 4.0    101   CO2 18* 20*   BUN 58* 48*   CREATININE 5.3* 4.2*   CALCIUM 7.5* 8.0*   ALBUMIN 2.7*  --    MG  --  1.2*   PHOS 4.8*  --        All pertinent labs within the past 24 hours have been reviewed.    Significant Imaging:   I have reviewed all pertinent imaging results/findings within the past 24 hours.

## 2020-02-02 NOTE — ASSESSMENT & PLAN NOTE
Will transfuse one unit of packed cell-monitor cbc in AM    2/1  Get iron study , B12, Folic  acid.   2/2  Low B12, Folic acid and low iron noted .Repalce b12, folic acid and iron orally

## 2020-02-02 NOTE — ASSESSMENT & PLAN NOTE
Nephrology following  Has improved some post transfusion and hydration; care now complicated with SOB, exacerbation of CHF after fluids  Strict I/O  Monitor creatinine  Avoid nephrotoxins

## 2020-02-02 NOTE — ASSESSMENT & PLAN NOTE
Will do wound culture , podiatry consult -will need to rule out osteomyelitis , will do bone scan in AM   WILL start Rocephin/zyvox  2/1  D/C Rocephin and Zyvox   Start oral Doxycycline 100 mg po   2/2  As above

## 2020-02-02 NOTE — ASSESSMENT & PLAN NOTE
Not felt to be acute cause of MARCELA  Likely new ureteral stent at some point  If acute issues, consider percutaneous nephrostomy tube

## 2020-02-02 NOTE — PROGRESS NOTES
Subjective:    Patient ID:  Kodi Ribeiro is a 70 y.o. male who presents for follow-up of Coronary Artery Disease      HPI  Mr. Ross is a pleasant 70-year-old gentleman with past medical history of PVD, CAD referred initially by Dr. Brennen Ibarra for evaluation of coronary artery disease after he was deemed to be at prohibitive risk for CABG by Dr. Ibarra.  The patient has a h/o multi-vessel CAD.  He was last seen in this clinic on 11/7/19.  At that time I discussed PCI with the patient and he declined, instead electing for medical management.  Today the patient denies angina in the interim since his last clinic visit.  He was admitted in January with an acute CHF exacerbation. Since his hospital discharge he reports that he experiences shortness of breath walking 100 yards that is relieved with rest. He denies PND since his hospital discharge.  He denies LE edema or orthopnea.  He denies palpitations, syncope, or near syncope.  He reports that since his hospital discharge he feels week and somewhat dehydrated.    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    Late complications of amputation stump     rseolved with further amputation( MRSA then none since 2014)    Lipoma of colon     colonoscopy 2/21/2014    Myocardial infarction     per patient 2000 & 9/2012    Peripheral vascular disease     Phantom limb syndrome     patient reports only  intermittent not problematic, not worsening    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    Spinal cord disease     L4L5 disc    Stroke     Tobacco dependence     resolved    Ureteral stent retained      Past Surgical History:   Procedure Laterality Date    ABDOMINAL AORTIC ANEURYSM REPAIR      ABDOMINAL AORTIC ANEURYSM REPAIR  1996/2014    AMPUTATION, LOWER LIMB      AORTA - BILATERAL FEMORAL ARTERY BYPASS GRAFT  2014    Left and right leg    CORONARY ANGIOPLASTY WITH STENT PLACEMENT  2000    Three placed in heart    CYSTOSCOPY W/ RETROGRADES Left 5/29/2018    Procedure: CYSTOSCOPY, WITH RETROGRADE PYELOGRAM;  Surgeon: Scooter Jin IV, MD;  Location: [facility] OR;  Service: Urology;  Laterality: Left;    CYSTOSCOPY W/ URETERAL STENT PLACEMENT Left 5/29/2018    Procedure: CYSTOSCOPY, WITH URETERAL STENT INSERTION;  Surgeon: Scooter Jin IV, MD;  Location: [facility] OR;  Service: Urology;  Laterality: Left;    CYSTOSCOPY W/ URETERAL STENT REMOVAL Left 5/29/2018    Procedure: CYSTOSCOPY, WITH URETERAL STENT REMOVAL;  Surgeon: Scooter Jin IV, MD;  Location: [facility] OR;  Service: Urology;  Laterality: Left;    FOOT AMPUTATION THROUGH METATARSAL  1996    left    FOOT SURGERY Bilateral 1980's    per patient multiple toe amputations prior to.  partial foot amputation:first great toe then other toes     KIDNEY SURGERY  2014    per patient separation of horseshoe kidney @ time of AAA repair    LEFT HEART CATHETERIZATION Left 3/7/2019    Procedure: CATHETERIZATION, HEART, LEFT;  Surgeon: Adriel Boone MD;  Location: [facility] CATH LAB;  Service: Cardiology;  Laterality: Left;  630 admit for IV hydration  10am start    LUNG LOBECTOMY Right 1970s    per patient not cancer    right below knee amputation  2009 (approx)    SMALL INTESTINE SURGERY  2014    per patient partial @ time of aaa repair  not small bowel - large bowel bowel compromised bythtwe AAAbowel    TONSILLECTOMY  1955 aprox    URETERAL STENT  PLACEMENT Left     annually replaced since  or so  Dr Jin     No current facility-administered medications on file prior to visit.      Current Outpatient Medications on File Prior to Visit   Medication Sig Dispense Refill    amLODIPine (NORVASC) 10 MG tablet TAKE 1 TABLET EVERY DAY 90 tablet 3    carvedilol (COREG) 25 MG tablet Take 1 tablet (25 mg total) by mouth 2 (two) times daily with meals. 60 tablet 11    cetirizine (ZYRTEC) 10 MG tablet Take 10 mg by mouth once daily.      clopidogrel (PLAVIX) 75 mg tablet TAKE 1 TABLET EVERY DAY 90 tablet 3    furosemide (LASIX) 20 MG tablet Take 1 tablet (20 mg total) by mouth once daily. 30 tablet 1    isosorbide mononitrate (IMDUR) 60 MG 24 hr tablet Take 2 tablets (120 mg total) by mouth once daily. 60 tablet 11    losartan (COZAAR) 100 MG tablet Take 1 tablet (100 mg total) by mouth once daily. 90 tablet 3    mupirocin (BACTROBAN) 2 % ointment Apply topically 3 (three) times daily. 1 Tube 2    omeprazole (PRILOSEC) 20 MG capsule TAKE 1 CAPSULE TWICE DAILY 180 capsule 3    aspirin (ECOTRIN) 81 MG EC tablet Take 1 tablet (81 mg total) by mouth once daily.  0    nitroglycerin (NITROSTAT) 0.6 MG Subl Place 1 tablet (0.6 mg total) under the tongue every 5 (five) minutes as needed (max 3/ per episode). 30 tablet 1     Review of patient's allergies indicates:   Allergen Reactions    Morphine Itching     Social History     Tobacco Use    Smoking status: Former Smoker     Packs/day: 1.00     Years: 15.00     Pack years: 15.00     Last attempt to quit: 2009     Years since quittin.0    Smokeless tobacco: Never Used   Substance Use Topics    Alcohol use: No    Drug use: No     Comment: Is on prescription opiod, no non prescribed use     Family History   Problem Relation Age of Onset    Cancer Mother         lung    COPD Mother     Heart disease Father         MI but per patient bc of old age    Diabetes Daughter     Eczema Neg Hx     Lupus  "Neg Hx     Psoriasis Neg Hx     Melanoma Neg Hx     Kidney disease Neg Hx     Stroke Neg Hx     Mental illness Neg Hx     Mental retardation Neg Hx     Hypertension Neg Hx     Hyperlipidemia Neg Hx     Drug abuse Neg Hx     Alcohol abuse Neg Hx     Depression Neg Hx            Review of Systems   Constitution: Positive for malaise/fatigue. Negative for decreased appetite, diaphoresis, fever, weight gain and weight loss.   HENT: Negative for congestion, nosebleeds and sore throat.    Eyes: Negative for blurred vision, vision loss in left eye, vision loss in right eye and visual disturbance.   Cardiovascular: Negative for chest pain, claudication, dyspnea on exertion, leg swelling, near-syncope, orthopnea, palpitations, paroxysmal nocturnal dyspnea and syncope.   Respiratory: Negative for cough, hemoptysis, shortness of breath and wheezing.    Endocrine: Negative for polyuria.   Hematologic/Lymphatic: Does not bruise/bleed easily.   Skin: Negative for nail changes and rash.   Musculoskeletal: Negative for back pain, muscle cramps and myalgias.   Gastrointestinal: Negative for abdominal pain, change in bowel habit, diarrhea, heartburn, hematemesis, hematochezia, melena, nausea and vomiting.   Genitourinary: Negative for bladder incontinence, dysuria, frequency and hematuria.   Psychiatric/Behavioral: Negative for depression.   Allergic/Immunologic: Negative for hives.        Objective:  Vitals:    01/30/20 1341 01/30/20 1345   BP: (!) 99/52 107/65   BP Location: Left arm Right arm   Patient Position: Sitting Sitting   BP Method: Large (Automatic) Large (Automatic)   Pulse: 63 63   SpO2: 97%    Weight: 82 kg (180 lb 12.4 oz)    Height: 6' 1" (1.854 m)          Physical Exam   Constitutional: He is oriented to person, place, and time. He appears well-developed and well-nourished.   HENT:   Head: Normocephalic and atraumatic.   Eyes: Pupils are equal, round, and reactive to light. EOM are normal.   Neck: Neck " supple. No JVD present. No thyromegaly present.   Cardiovascular: Normal rate and regular rhythm. PMI is displaced. Exam reveals no gallop and no friction rub.   No murmur heard.  Pulses:       Carotid pulses are 2+ on the right side, and 2+ on the left side.       Radial pulses are 2+ on the right side, and 2+ on the left side.        Femoral pulses are 2+ on the right side, and 2+ on the left side.       Dorsalis pedis pulses are 2+ on the right side, and 2+ on the left side.        Posterior tibial pulses are 2+ on the right side, and 2+ on the left side.   Normal right radial Jermaine's test.   Pulmonary/Chest: Effort normal and breath sounds normal. He has no wheezes. He has no rhonchi. He has no rales.   Abdominal: Soft. Normal appearance and bowel sounds are normal. He exhibits no distension. There is no hepatosplenomegaly. There is no tenderness.   Neurological: He is alert and oriented to person, place, and time. Gait normal.   Psychiatric: He has a normal mood and affect.         Assessment:       1. CKD (chronic kidney disease) stage 3, GFR 30-59 ml/min    2. Left carotid artery stenosis    3. CKD stage G4/A3, GFR 15-29 and albumin creatinine ratio >300 mg/g    4. PVD (peripheral vascular disease)    5. Coronary artery disease involving native coronary artery of native heart without angina pectoris         Plan:       1) CAD.  The patient has multivessel coronary artery disease and reported symptoms consistent with CCS class 3 angina in the past.  Howeverhe is now free of angina.   I discussed medical management versus medical management versus PCI with the patient in detail.  The patient elected to continue with medical management.    -continue enteric-coated aspirin 81 mg p.o. Q.day  -continue Plavix 75 mg p.o. Q.day  -contnue Coreg 25 mg p.o. b.i.d.  -continue losartan 100 mg p.o. Q.day  -continue Imdur 120 mg p.o. q.day   -CAD risk factor reduction  -recommend a heart healthy diet  -will refer the  patient to general cardiology clinic as he is no longer interested in coronary revascularization     2) hypertension.  The patient's blood pressure is adequately controlled in clinic today.    -continue Koicina50 mg p.o. Q.day  -continue losartan 100 mg p.o. q.day  -continue Imdur 120 mg p.o. Q.day  -increase Coreg to 25 mg p.o. b.i.d.     3) dyslipidemia.  11/14/2019 lipid panel reviewed.  Despite having an excellent TC and  LDL level the patient has multivessel CAD.   -res restarting Lipitor 40 mg p.o. Q.day  -recommend heart healthy diet     4) CKD 3.  The patient has CKD 3 and is almost at CKD 4.    -given his subjective feeling of dehydration will check BMP and BNP today  -avoid nephrotoxic medications  -patient has an uncoming appointment with his urologist regarding his uretheral stent     5) carotid stenosis.  08/15/2017 carotid duplex reviewed.  The patient is status post left carotid endarterectomy subsequent to that duplex study.  -as stated above will initiate statin therapy  -will order carotid duplex      6) history of CVA.  Continue aspirin 81 mg p.o. q.day as stated above will initiate statin therapy for secondary prevention of stroke in addition to CAD prevention     All of the patient's questions were answered.

## 2020-02-02 NOTE — ASSESSMENT & PLAN NOTE
2/2  Overnight pt developed worsening SOB and hypoxia likely due to acute exacerbation of Chronic diastolic HF  IVF discontinued   Pt had received Lasix 60 mg IV x 1 dose   Monitor volume status   Monitor renal indices   Wean FiO2 as tolerated   CT chest revealed yary pleural effusion L>R, airspace disease involving yary lung bases , evidence of pulmonary fibrosis , subpleural bulla and centrilobular emphysema    Bronchodilator treatment

## 2020-02-02 NOTE — ASSESSMENT & PLAN NOTE
H/h stable post transfusion  Monitor trend  Anticipate outpatient follow up, monitoring, and treatment

## 2020-02-02 NOTE — PROGRESS NOTES
[facility] Hospital Medicine  Progress Note    Patient Name: Kodi Ribeiro  MRN: [id number]  Patient Class: IP- Inpatient   Admission Date: 1/31/2020  Length of Stay: 1 days  Attending Physician: Markell Magaña MD  Primary Care Provider: Norma Nuñez MD        Subjective:     Principal Problem:Acute renal failure superimposed on chronic kidney disease        HPI:   69 year old man with extensive past medical and surgery history of    stroke, spinal cord disease, peripheral vascular disease, MI (per patient, 2000 & 2012), lipoma of colon, infection of aortic graft, HTN, HLD, hyperglycemia, horseshoe kidney, hemorrhoids, GERD. He was recently discharged from the Hospital on  1/15/2020. He presented at this time with chest pain that started few hours prior to presentation.Pain was described a sharp ,non radiating ,not associated with diaphoresis and worsened with exertion. He called his wife and EMS was called.   Since admission, Lab data showed   Component      Latest Ref Rng & Units 1/31/2020 1/30/2020 1/15/2020               Hemoglobin      14.0 - 18.0 g/dL 6.9 (L) 7.0 (L) 7.7 (L)     Component      Latest Ref Rng & Units 1/31/2020 1/30/2020 1/15/2020               Creatinine      0.5 - 1.4 mg/dL 6.6 (H) 6.0 (H) 2.4 (H)   He also reported increased pain from the left foot ulceration and when the dressing was opened-sánchez pus was seen .  He will be admitted for symptomatic anemia and acute renal failure .    Overview/Hospital Course:  1/31-  Severe left sided hydronephrosis with ureteral stent visualized. In setting of acute renal failure concern with this being cause. This was discussed with Dr. Brennen Maldonado who feels that hydronephrosis is a chronic issue and not sole cause of acute renal failure. Patient already has stent and may need IR consult for nephrostomy tube placement. Will consult IR on case. Attempted to contact patient's urologist earlier today Dr. Jin, left voicemail. Will  dc asa and plavix due to pending procedure. Currently receiving 1 unit prbc. Will transfuse < 7.      Discussed case with Dr. Jin. Concerned with outlet obstruction. Recommended getting bladder scan which was negative. Recommendations were to workup other causes of renal failure as left sided hydronephrosis alone should not cause acute renal failure with the right kidney working.      Started sterile water with 150meq bicarb this am. Cont to monitor urine output. Fena calculated was 2.2% indicating intrinsic renal disease.      Case was discussed with IR earlier. Concerned with bleeding due to asa and plavix use. Does not recommend nephrostomy tube at this time due to risks. If any procedure needs to be done, recommended ureteral stent changed instead as it is less invasive.     2/1  Pt has no new C/O today . SOB is at baseline . On O2 at 2L/min. BNP is elevated noted .  Continued on gentle hydration with Bicarb . Serum creatinine is slightly better 6.6>5.3   Off of diuretics , ASA and Plavix  Possible ureteral stent change on Monday by  (Urologist)     S/P 1 unit of P-RBC yesterday  . Pt has H/O   chronic iron def anemia and is followed in the Hematology clinic on a regular basis . Baseline Hemoglobin ranges 8 to 9.   Bone scan suspicious for left foot osteomyelitis possibly chronic and does not appear an active issue at this time. Podiatry consult obtained and plan is noted   Will D/C broad spectrum antibiotic . Pt does have left foot ulceration and will place pt on Oral Doxycycline for 5 to  7 days.       Interval History:   Pt has no new C/O today . SOB is at baseline . On O2 at 2L/min. BNP is elevated noted .  Continued on gentle hydration with Bicarb . Serum creatinine is slightly better 6.6>5.3   Off of diuretics , ASA and Plavix  Possible ureteral stent change on Monday by  (Urologist)     S/P 1 unit of P-RBC yesterday  . Pt has H/O   chronic iron def anemia and is followed in  the Hematology clinic on a regular basis . Baseline Hemoglobin ranges 8 to 9.   Bone scan suspicious for left foot osteomyelitis possibly chronic and does not appear an active issue at this time. Podiatry consult obtained and plan is noted   Will D/C broad spectrum antibiotic . Pt does have left foot ulceration and will place pt on Oral Doxycycline for 5 to  7 days.           Review of Systems   Constitutional: Negative for activity change, appetite change and fever.   HENT: Negative for sore throat.    Eyes: Negative for visual disturbance.   Respiratory: Positive for shortness of breath. Negative for cough and chest tightness.    Cardiovascular: Negative for chest pain, palpitations and leg swelling.   Gastrointestinal: Negative for abdominal distention, abdominal pain, constipation, diarrhea, nausea and vomiting.   Endocrine: Negative for polyuria.   Genitourinary: Negative for decreased urine volume, dysuria, flank pain, frequency and hematuria.   Musculoskeletal: Negative for back pain and gait problem.   Skin: Negative for rash.   Neurological: Negative for syncope, speech difficulty, weakness, light-headedness and headaches.   Psychiatric/Behavioral: Negative for confusion, hallucinations and sleep disturbance.     Objective:     Vital Signs (Most Recent):  Temp: 98.8 °F (37.1 °C) (02/01/20 1202)  Pulse: 68 (02/01/20 1700)  Resp: 18 (02/01/20 0724)  BP: (!) 141/68 (02/01/20 1202)  SpO2: 99 % (02/01/20 1202) Vital Signs (24h Range):  Temp:  [97 °F (36.1 °C)-98.8 °F (37.1 °C)] 98.8 °F (37.1 °C)  Pulse:  [65-77] 68  Resp:  [16-19] 18  SpO2:  [93 %-99 %] 99 %  BP: (118-151)/(57-68) 141/68     Weight: 79 kg (174 lb 2.6 oz)  Body mass index is 22.98 kg/m².    Intake/Output Summary (Last 24 hours) at 2/1/2020 1750  Last data filed at 2/1/2020 1500  Gross per 24 hour   Intake 3148.75 ml   Output 500 ml   Net 2648.75 ml      Physical Exam   Constitutional: He is oriented to person, place, and time. He appears  well-developed and well-nourished. No distress.   HENT:   Head: Normocephalic and atraumatic.   Mouth/Throat: No oropharyngeal exudate.   Eyes: Pupils are equal, round, and reactive to light. Conjunctivae and EOM are normal.   Neck: Normal range of motion. Neck supple. No JVD present. No thyromegaly present.   Cardiovascular: Normal rate, regular rhythm and normal heart sounds.   No murmur heard.  Pulmonary/Chest: Effort normal and breath sounds normal. No respiratory distress. He has no wheezes. He has no rales. He exhibits no tenderness.   Abdominal: Soft. Bowel sounds are normal. He exhibits no distension. There is no tenderness. There is no rebound and no guarding.   Musculoskeletal: Normal range of motion. He exhibits deformity (Rt BKA. Left foor transmetatarsal amputation ). He exhibits no edema.   Lymphadenopathy:     He has no cervical adenopathy.   Neurological: He is alert and oriented to person, place, and time. He displays normal reflexes. No cranial nerve deficit or sensory deficit.   Skin: Skin is warm and dry. No rash noted. He is not diaphoretic.   Psychiatric: He has a normal mood and affect.       Significant Labs:   BMP:   Recent Labs   Lab 02/01/20  0829   GLU 99   *   K 4.7      CO2 18*   BUN 58*   CREATININE 5.3*   CALCIUM 7.5*     CBC:   Recent Labs   Lab 01/31/20  0034 01/31/20  0441 01/31/20  1444 02/01/20  0829   WBC 6.36 4.51  --  3.42*   HGB 6.9* 6.7* 7.8* 8.0*   HCT 22.8* 22.8* 25.6* 25.1*    202  --  186     CMP:   Recent Labs   Lab 01/31/20  0034 01/31/20  0441 02/01/20  0829   * 133* 134*   K 5.1 5.4* 4.7    110 103   CO2 11* 12* 18*    109 99   BUN 61* 62* 58*   CREATININE 6.6* 6.6* 5.3*   CALCIUM 7.6* 7.6* 7.5*   PROT 7.1  --   --    ALBUMIN 3.0*  --  2.7*   BILITOT 0.3  --   --    ALKPHOS 131  --   --    AST 6*  --   --    ALT <5*  --   --    ANIONGAP 15 11 13   EGFRNONAA 8* 8* 10*       Significant Imaging:       Assessment/Plan:      * Acute  renal failure superimposed on chronic kidney disease    Will do renal ultrasound,start gentle hydration, nephrology consult, check renal function in AM     2/1  Nephrology consult obtained   Continue gentle hydration   Serum creatinine is trending down slowly   Monitor volume status   H/O Left ureteral stricture and stent , now presenting with left hydronephrosis . Pt will have left ureteral stent change possibly on Monday 2/3/20 by      Hydronephrosis of left kidney  -H/O Left ureteral stricture and ureteral stent . Will have left ureteral stent change possibly on Monday by        Acute on chronic anemia    Will transfuse one unit of packed cell-monitor cbc in AM    2/1  Get iron study , B12, Folic  acid.     CKD (chronic kidney disease) stage 3, GFR 30-59 ml/min        Foot ulcer - Left Foot    Will do wound culture , podiatry consult -will need to rule out osteomyelitis , will do bone scan in AM   WILL start Rocephin/zyvox  2/1  D/C Rocephin and Zyvox   Start oral Doxycycline 100 mg po     Essential hypertension    Will continue home medication of norvasc      VTE Risk Mitigation (From admission, onward)         Ordered     Reason for No Pharmacological VTE Prophylaxis  Once     Question:  Reasons:  Answer:  Physician Provided (leave comment)    01/31/20 0317     IP VTE HIGH RISK PATIENT  Once      01/31/20 0317                      Markell Magaña MD  Department of Hospital Medicine   [facility]

## 2020-02-02 NOTE — NURSING
Called to patient room, patient complain of chest pain and sob after transferring from bed to bedside commode. Bp 170/79 pulse 93, re-assessed bp 174/79 hr 97, administered second dose of nitro.  Wheezing noted. Notified Hospital Medicine, will continue to monitor.

## 2020-02-02 NOTE — NURSING
Patient complain of chest pain, bp 153/76  hr 82  ,administered nitoglycerin 0.4 mg sublingual X 1 , patient verbalized relief from pain. Notified Hospital Medicine.  Will continue to monitor.

## 2020-02-03 LAB
ANION GAP SERPL CALC-SCNC: 10 MMOL/L (ref 8–16)
BACTERIA SPEC AEROBE CULT: NORMAL
BASOPHILS # BLD AUTO: 0.02 K/UL (ref 0–0.2)
BASOPHILS NFR BLD: 0.6 % (ref 0–1.9)
BUN SERPL-MCNC: 45 MG/DL (ref 8–23)
CALCIUM SERPL-MCNC: 8.3 MG/DL (ref 8.7–10.5)
CHLORIDE SERPL-SCNC: 103 MMOL/L (ref 95–110)
CO2 SERPL-SCNC: 21 MMOL/L (ref 23–29)
CREAT SERPL-MCNC: 3.6 MG/DL (ref 0.5–1.4)
DIFFERENTIAL METHOD: ABNORMAL
EOSINOPHIL # BLD AUTO: 0.2 K/UL (ref 0–0.5)
EOSINOPHIL NFR BLD: 4.5 % (ref 0–8)
ERYTHROCYTE [DISTWIDTH] IN BLOOD BY AUTOMATED COUNT: 15.8 % (ref 11.5–14.5)
EST. GFR  (AFRICAN AMERICAN): 19 ML/MIN/1.73 M^2
EST. GFR  (NON AFRICAN AMERICAN): 16 ML/MIN/1.73 M^2
GLUCOSE SERPL-MCNC: 85 MG/DL (ref 70–110)
HCT VFR BLD AUTO: 26.2 % (ref 40–54)
HGB BLD-MCNC: 8 G/DL (ref 14–18)
IMM GRANULOCYTES # BLD AUTO: 0.02 K/UL (ref 0–0.04)
IMM GRANULOCYTES NFR BLD AUTO: 0.6 % (ref 0–0.5)
LYMPHOCYTES # BLD AUTO: 0.6 K/UL (ref 1–4.8)
LYMPHOCYTES NFR BLD: 16.1 % (ref 18–48)
MAGNESIUM SERPL-MCNC: 2 MG/DL (ref 1.6–2.6)
MCH RBC QN AUTO: 27 PG (ref 27–31)
MCHC RBC AUTO-ENTMCNC: 30.5 G/DL (ref 32–36)
MCV RBC AUTO: 89 FL (ref 82–98)
MONOCYTES # BLD AUTO: 0.3 K/UL (ref 0.3–1)
MONOCYTES NFR BLD: 8.5 % (ref 4–15)
NEUTROPHILS # BLD AUTO: 2.5 K/UL (ref 1.8–7.7)
NEUTROPHILS NFR BLD: 69.7 % (ref 38–73)
NRBC BLD-RTO: 0 /100 WBC
PLATELET # BLD AUTO: 193 K/UL (ref 150–350)
PMV BLD AUTO: 10.1 FL (ref 9.2–12.9)
POTASSIUM SERPL-SCNC: 4.1 MMOL/L (ref 3.5–5.1)
RBC # BLD AUTO: 2.96 M/UL (ref 4.6–6.2)
SODIUM SERPL-SCNC: 134 MMOL/L (ref 136–145)
WBC # BLD AUTO: 3.53 K/UL (ref 3.9–12.7)

## 2020-02-03 PROCEDURE — 99223 PR INITIAL HOSPITAL CARE,LEVL III: ICD-10-PCS | Mod: HCNC,,, | Performed by: UROLOGY

## 2020-02-03 PROCEDURE — 25000003 PHARM REV CODE 250: Mod: HCNC | Performed by: NURSE PRACTITIONER

## 2020-02-03 PROCEDURE — 21400001 HC TELEMETRY ROOM: Mod: HCNC

## 2020-02-03 PROCEDURE — 27000221 HC OXYGEN, UP TO 24 HOURS: Mod: HCNC

## 2020-02-03 PROCEDURE — 85025 COMPLETE CBC W/AUTO DIFF WBC: CPT | Mod: HCNC

## 2020-02-03 PROCEDURE — 99233 PR SUBSEQUENT HOSPITAL CARE,LEVL III: ICD-10-PCS | Mod: HCNC,,, | Performed by: INTERNAL MEDICINE

## 2020-02-03 PROCEDURE — 25000242 PHARM REV CODE 250 ALT 637 W/ HCPCS: Mod: HCNC | Performed by: NURSE PRACTITIONER

## 2020-02-03 PROCEDURE — 99223 1ST HOSP IP/OBS HIGH 75: CPT | Mod: HCNC,,, | Performed by: UROLOGY

## 2020-02-03 PROCEDURE — 99900035 HC TECH TIME PER 15 MIN (STAT): Mod: HCNC

## 2020-02-03 PROCEDURE — 99232 PR SUBSEQUENT HOSPITAL CARE,LEVL II: ICD-10-PCS | Mod: HCNC,,, | Performed by: INTERNAL MEDICINE

## 2020-02-03 PROCEDURE — 25000242 PHARM REV CODE 250 ALT 637 W/ HCPCS: Mod: HCNC | Performed by: INTERNAL MEDICINE

## 2020-02-03 PROCEDURE — 36415 COLL VENOUS BLD VENIPUNCTURE: CPT | Mod: HCNC

## 2020-02-03 PROCEDURE — 99232 SBSQ HOSP IP/OBS MODERATE 35: CPT | Mod: HCNC,,, | Performed by: INTERNAL MEDICINE

## 2020-02-03 PROCEDURE — 83735 ASSAY OF MAGNESIUM: CPT | Mod: HCNC

## 2020-02-03 PROCEDURE — 94640 AIRWAY INHALATION TREATMENT: CPT | Mod: HCNC

## 2020-02-03 PROCEDURE — 80048 BASIC METABOLIC PNL TOTAL CA: CPT | Mod: HCNC

## 2020-02-03 PROCEDURE — 94660 CPAP INITIATION&MGMT: CPT | Mod: HCNC

## 2020-02-03 PROCEDURE — 99233 SBSQ HOSP IP/OBS HIGH 50: CPT | Mod: HCNC,,, | Performed by: INTERNAL MEDICINE

## 2020-02-03 RX ORDER — GUAIFENESIN 100 MG/5ML
200 SOLUTION ORAL EVERY 4 HOURS PRN
Status: DISCONTINUED | OUTPATIENT
Start: 2020-02-03 | End: 2020-02-06 | Stop reason: HOSPADM

## 2020-02-03 RX ADMIN — AMLODIPINE BESYLATE 10 MG: 10 TABLET ORAL at 08:02

## 2020-02-03 RX ADMIN — CARVEDILOL 25 MG: 12.5 TABLET, FILM COATED ORAL at 08:02

## 2020-02-03 RX ADMIN — DOXYCYCLINE HYCLATE 100 MG: 100 TABLET, COATED ORAL at 09:02

## 2020-02-03 RX ADMIN — GUAIFENESIN 200 MG: 200 SOLUTION ORAL at 09:02

## 2020-02-03 RX ADMIN — FERROUS GLUCONATE TAB 324 MG (37.5 MG ELEMENTAL IRON) 324 MG: 324 (37.5 FE) TAB at 08:02

## 2020-02-03 RX ADMIN — PANTOPRAZOLE SODIUM 40 MG: 40 TABLET, DELAYED RELEASE ORAL at 08:02

## 2020-02-03 RX ADMIN — IPRATROPIUM BROMIDE AND ALBUTEROL SULFATE 3 ML: .5; 3 SOLUTION RESPIRATORY (INHALATION) at 12:02

## 2020-02-03 RX ADMIN — IPRATROPIUM BROMIDE AND ALBUTEROL SULFATE 3 ML: .5; 3 SOLUTION RESPIRATORY (INHALATION) at 07:02

## 2020-02-03 RX ADMIN — DOXYCYCLINE HYCLATE 100 MG: 100 TABLET, COATED ORAL at 08:02

## 2020-02-03 RX ADMIN — Medication 1 TABLET: at 08:02

## 2020-02-03 RX ADMIN — FERROUS GLUCONATE TAB 324 MG (37.5 MG ELEMENTAL IRON) 324 MG: 324 (37.5 FE) TAB at 06:02

## 2020-02-03 RX ADMIN — FUROSEMIDE 20 MG: 20 TABLET ORAL at 08:02

## 2020-02-03 RX ADMIN — CARVEDILOL 25 MG: 12.5 TABLET, FILM COATED ORAL at 06:02

## 2020-02-03 NOTE — PROGRESS NOTES
Pt transferred to 254 at this time via wheelchair, accompanied by MANI Alvarez. 4 eyes on skin. All belongings with pt. Pt in NAD. NPO after midnight for ureter stent replacement.

## 2020-02-03 NOTE — SUBJECTIVE & OBJECTIVE
Interval History:   SOB is better . Currently on O2 at 2L/min . Downgrade to Telemetry today   Urology consult obtained . CT abd /pelvis revealed interval worsening appearance of chronic severe left-sided hydronephrosis, and progression of severe atrophy of the left renal cortex. Left-sided double-J ureteral stent in satisfactory position. Per Urology left ureteral stent exchange tomorrow to lower risk of  pyelonephritis and to preserve remaining left kidney function.     Serum creatinine is trending down suggestive of renal function recovery . 42> 3.6   Urine output is satisfactory         Review of Systems   Constitutional: Negative for activity change, appetite change and fever.   HENT: Negative for sore throat.    Eyes: Negative for visual disturbance.   Respiratory: Positive for shortness of breath (better ). Negative for cough and chest tightness.    Cardiovascular: Negative for chest pain, palpitations and leg swelling.   Gastrointestinal: Negative for abdominal distention, abdominal pain, constipation, diarrhea, nausea and vomiting.   Endocrine: Negative for polyuria.   Genitourinary: Negative for decreased urine volume, dysuria, flank pain, frequency and hematuria.   Musculoskeletal: Negative for back pain and gait problem.   Skin: Negative for rash.   Neurological: Negative for syncope, speech difficulty, weakness, light-headedness and headaches.   Psychiatric/Behavioral: Negative for confusion, hallucinations and sleep disturbance.     Objective:     Vital Signs (Most Recent):  Temp: 97.9 °F (36.6 °C) (02/03/20 1105)  Pulse: 66 (02/03/20 1335)  Resp: 13 (02/03/20 1335)  BP: (!) 129/53 (02/03/20 1335)  SpO2: 95 % (02/03/20 1335) Vital Signs (24h Range):  Temp:  [97.9 °F (36.6 °C)-98.1 °F (36.7 °C)] 97.9 °F (36.6 °C)  Pulse:  [63-90] 66  Resp:  [11-21] 13  SpO2:  [88 %-100 %] 95 %  BP: (115-165)/(44-78) 129/53     Weight: 80.5 kg (177 lb 7.5 oz)  Body mass index is 23.41 kg/m².    Intake/Output Summary  (Last 24 hours) at 2/3/2020 1416  Last data filed at 2/3/2020 1300  Gross per 24 hour   Intake 900 ml   Output 2050 ml   Net -1150 ml      Physical Exam   Constitutional: He is oriented to person, place, and time. He appears well-developed and well-nourished. No distress.   HENT:   Head: Normocephalic and atraumatic.   Mouth/Throat: No oropharyngeal exudate.   Eyes: Pupils are equal, round, and reactive to light. Conjunctivae and EOM are normal.   Neck: Normal range of motion. Neck supple. No JVD present. No thyromegaly present.   Cardiovascular: Normal rate, regular rhythm and normal heart sounds.   No murmur heard.  Pulmonary/Chest: Effort normal and breath sounds normal. No respiratory distress. He has no wheezes. He has no rales. He exhibits no tenderness.   Abdominal: Soft. Bowel sounds are normal. He exhibits no distension. There is no tenderness. There is no rebound and no guarding.   Musculoskeletal: Normal range of motion.   S/P RT BKA , Left foot transmetatarsal amputation    Lymphadenopathy:     He has no cervical adenopathy.   Neurological: He is alert and oriented to person, place, and time. He displays normal reflexes. No cranial nerve deficit or sensory deficit.   Skin: Skin is warm and dry. No rash noted. He is not diaphoretic.   Psychiatric: He has a normal mood and affect.       Significant Labs:   CBC:   Recent Labs   Lab 02/02/20  0405 02/03/20  0332   WBC 3.70* 3.53*   HGB 8.7* 8.0*   HCT 28.3* 26.2*    193     CMP:   Recent Labs   Lab 02/02/20  0405 02/03/20  0332   * 134*   K 4.0 4.1    103   CO2 20* 21*   GLU 96 85   BUN 48* 45*   CREATININE 4.2* 3.6*   CALCIUM 8.0* 8.3*   ANIONGAP 13 10   EGFRNONAA 13* 16*       Significant Imaging: CT abd/pelvis -    Horseshoe kidney again noted.  Unchanged chronic severe cortical atrophy of the midline component of the horseshoe kidney compared to 08/11/2017. Unchanged normal appearance of the right kidney, without  hydronephrosis.    Compared to prior study of 08/11/2017, interval worsening appearance of chronic severe left-sided hydronephrosis, and progression of severe atrophy of the left renal cortex. Left-sided double-J ureteral stent in satisfactory position.

## 2020-02-03 NOTE — ASSESSMENT & PLAN NOTE
Will do wound culture , podiatry consult -will need to rule out osteomyelitis , will do bone scan in AM   WILL start Rocephin/zyvox  2/1  D/C Rocephin and Zyvox   Start oral Doxycycline 100 mg po   2/2, 2/3  As above

## 2020-02-03 NOTE — SUBJECTIVE & OBJECTIVE
Interval History:  Shortness of breath has improved.  Urine output has improved.  Acute kidney injury has improved as well pending stent replacement maybe tomorrow    Review of patient's allergies indicates:   Allergen Reactions    Morphine Itching     Current Facility-Administered Medications   Medication Frequency    acetaminophen tablet 500 mg Q6H PRN    albuterol-ipratropium 2.5 mg-0.5 mg/3 mL nebulizer solution 3 mL Q4H PRN    albuterol-ipratropium 2.5 mg-0.5 mg/3 mL nebulizer solution 3 mL Q6H    amLODIPine tablet 10 mg Daily    carvedilol tablet 25 mg BID WM    dextrose 10% (D10W) Bolus PRN    dextrose 10% (D10W) Bolus PRN    doxycycline tablet 100 mg Q12H    ferrous gluconate tablet 324 mg BID WM    folic acid-vit B6-vit B12 2.5-25-2 mg tablet 1 tablet Daily    furosemide tablet 20 mg Daily    glucagon (human recombinant) injection 1 mg PRN    glucose chewable tablet 16 g PRN    glucose chewable tablet 24 g PRN    hydrALAZINE injection 10 mg Q8H PRN    nitroGLYCERIN SL tablet 0.4 mg Q5 Min PRN    pantoprazole EC tablet 40 mg Daily    sodium chloride 0.9% flush 10 mL PRN       Objective:     Vital Signs (Most Recent):  Temp: 98 °F (36.7 °C) (02/03/20 0705)  Pulse: 64 (02/03/20 0937)  Resp: 17 (02/03/20 0937)  BP: (!) 135/56 (02/03/20 0935)  SpO2: (!) 91 % (02/03/20 0937)  O2 Device (Oxygen Therapy): nasal cannula (02/03/20 0741) Vital Signs (24h Range):  Temp:  [97.9 °F (36.6 °C)-98.1 °F (36.7 °C)] 98 °F (36.7 °C)  Pulse:  [63-90] 64  Resp:  [11-21] 17  SpO2:  [88 %-100 %] 91 %  BP: (115-165)/(44-78) 135/56     Weight: 80.5 kg (177 lb 7.5 oz) (02/02/20 0400)  Body mass index is 23.41 kg/m².  Body surface area is 2.04 meters squared.    I/O last 3 completed shifts:  In: 2710 [P.O.:660; I.V.:2050]  Out: 2250 [Urine:2250]    Physical Exam   Constitutional: He is oriented to person, place, and time. He appears well-developed and well-nourished. No distress.   HENT:   Head: Normocephalic and  atraumatic.   Mouth/Throat: No oropharyngeal exudate.   Eyes: Pupils are equal, round, and reactive to light. Conjunctivae and EOM are normal.   Neck: Normal range of motion. Neck supple. No JVD present. No thyromegaly present.   Cardiovascular: Normal rate, regular rhythm and normal heart sounds.   No murmur heard.  Pulmonary/Chest: Effort normal. No respiratory distress. He has no wheezes. He has no rales. He exhibits no tenderness.   Bronchial breath sounds yary  Decreased breath sounds at bases    Abdominal: Soft. Bowel sounds are normal. He exhibits no distension. There is no tenderness. There is no rebound and no guarding.   Musculoskeletal: Normal range of motion. He exhibits no edema.   Lymphadenopathy:     He has no cervical adenopathy.   Neurological: He is alert and oriented to person, place, and time. He displays normal reflexes. No cranial nerve deficit or sensory deficit.   Skin: Skin is warm and dry. No rash noted. He is not diaphoretic.   Psychiatric: He has a normal mood and affect.       Significant Labs:  All labs within the past 24 hours have been reviewed.     Significant Imaging:

## 2020-02-03 NOTE — PLAN OF CARE
Patient on O2 tolerating well. Patient tolerated Bipap no distress noted. Patient tolerated breathing treatments without issue.

## 2020-02-03 NOTE — PLAN OF CARE
Patient resting quietly with no complaints of chest pain or sob.  Patient sleeping on nasal cannula after refusing to put on cpap at bedtime. Sats 92-95. IV patent and saline locked. Patient has urinal at bedside and bedpan available. NPO since midnight for ureter stent replacement 2/3/2020. SR on monitor but did have a run of VT earlier this shift. Strip in chart for reference. Otherwise vital signs stable.

## 2020-02-03 NOTE — ASSESSMENT & PLAN NOTE
Will transfuse one unit of packed cell-monitor cbc in AM    2/1  Get iron study , B12, Folic  acid.   2/2, 2/3   Low B12, Folic acid and low iron noted .Repalce b12, folic acid and iron orally

## 2020-02-03 NOTE — ASSESSMENT & PLAN NOTE
2/2  Overnight pt developed worsening SOB and hypoxia likely due to acute exacerbation of Chronic diastolic HF  IVF discontinued   Pt had received Lasix 60 mg IV x 1 dose   Monitor volume status   Monitor renal indices   Wean FiO2 as tolerated   CT chest revealed yary pleural effusion L>R, airspace disease involving yary lung bases , evidence of pulmonary fibrosis , subpleural bulla and centrilobular emphysema    Bronchodilator treatment    2/3  Improving   Continue gentle diuresis  Wean  FiO2   Continue bronchodilators

## 2020-02-03 NOTE — CONSULTS
Chief Complaint: Left ureteral stricture with stent    HPI:   2/3/20: Was to have his stent changed but other acute issues precluded clearance since his last visit.  Seems to be better overall.  CT today shows severe right hydronephrosis; compred to CT 3 years ago that showed moderate right hydronephrosis.  Seems to void well, nocturia x1-2.  Reviewed history in detail. Stent likely patent given history.  6/3/19: Time to change left stent, will arrange.  Reviewed history in detail.  Needs US.  Had a heart cath and needs a CABG but can't be done due to prior reconstruction. Been treated for meningitis recently and still has neck pain.  5/17/18: Time to change left stent, will arrange.  No acute changes.  4/24/17: Due for left stent change, will arrange.  4/22/16: No changes in last year feeling very well.  Here to replace left stent.  Says he is doing great.  4/22/15: Doing fine with stent change. Stent had no problems after a year; RTC 3/1/16 to plan for next change.   3/19/15: Pt had his stent replaced 1/2014 but has not been back since.  He had major surgery for bowel abscess in the months after he saw me with a lot of vascular reconstruction.  Here to discuss replacement of his left ureteral stent.  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  1/17/14: Pt returns for f/u.  Recent CT shows stent still in place with no urolithiasis.  He has had a fem-fem bypass and his legs are working much better; he felt so good.    10/2012: Pt was found to have left ureteral stricture secondary to AAA repair. A stent was placed in May and then replaced by me last week. He has a long stricture of the middle ureter, such that a reimplant would require a psoas hitch/boari flap reaching to the upper ureter. This is complicated by the patient having a horseshoe kidney so nephropexy with proximal mobilization would be impractical. He is tolerating the stent well and is practically unaware of its presence.  >>> he was to have [facility]  and  f/u from this appt in 5 mo for stent replacement but did not return, Mag3 showed improved drainage with stent..... <<<  9/2012: About 3 months ago pt had severe left kidney infection and had an MI during the same period. He was diagnosed as having a left ureteral obstruction at the level of the iliac crest. A left ureteral stent was placed. He had a Mag3 study prior to stent placement that showed normal uptake bilaterally, normal right and delayed left excretion (44 min). Recent MI caused some damage but no further stenting or CABG indicated now. No history of kidney stones. Had a full body scan in 2008 that showed left hydroureter and had a stent placed at that time and it was removed 8 months later with no plan for followup. Current stent has been in place for two months. No LUTS. Pain 0/10. Had an episode of gross hematuria one time last week.    Allergies:  Morphine    Medications:  has a current medication list which includes the following prescription(s): amlodipine, aspirin, carvedilol, clopidogrel, docusate sodium, isosorbide mononitrate, losartan, nitroglycerin, omeprazole, oxycodone-acetaminophen, pantoprazole, pravastatin, sertraline, and triamcinolone acetonide 0.1%.    Review of Systems:  General: No fever, chills, fatigability, or weight loss.  Skin: No rashes, itching, or changes in color or texture of skin.  Chest: Denies MACKEY, cyanosis, wheezing, cough, and sputum production.  Abdomen: Appetite fine. No weight loss. Denies diarrhea, abdominal pain, hematemesis, or blood in stool.  Musculoskeletal: No joint stiffness or swelling. Denies back pain.  : As above.  All other review of systems negative.    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    PSH:   has a past surgical history that includes Aorta - bilateral femoral artery bypass graft (2014); Coronary angioplasty with stent (2000); Abdominal aortic aneurysm repair; Ureteral stent placement (Left); right below knee amputation (2009 (approx)); Foot amputation through metatarsal (1996); Lung lobectomy (Right, 1970s); Abdominal aortic aneurysm repair (1996/2014); Kidney surgery (2014); Foot surgery (Bilateral, 1980's); Amputation; Tonsillectomy (1955 aprox); Small intestine surgery (2014); Cystoscopy w/ ureteral stent placement (Left, 5/29/2018); Cystoscopy w/ retrogrades (Left, 5/29/2018); Cystoscopy w/ ureteral stent removal (Left, 5/29/2018); and Left heart catheterization (Left, 3/7/2019).    FamHx: family history includes COPD in his mother; Cancer in his mother; Diabetes in his daughter; Heart disease in his father.    SocHx:  reports that he quit smoking about 11 years ago. He has a 15.00 pack-year smoking history. He has never used smokeless tobacco. He reports that he does not drink alcohol or use drugs.      Physical Exam:  Vitals:    02/03/20 1234   BP:    Pulse: 69   Resp: 17   Temp:      General: A&Ox3, no apparent distress, no deformities  Neck: No masses, normal thyroid  Abdomen: Soft, NT, ND  Skin: The skin is warm and dry. No jaundice.  Ext: No c/c/e.  : deferred to cysto    Labs/Studies:   PSA    11/13: 0.5    12/14: 0.4  Urinalysis performed in clinic, summary: UA normal    Impression/Plan:   1. To OR tomorrow for left ureteral stent change  2.  Hydronephrosis could be physiologic due to chronic backpressure from the bladder.   3. Med changes have sig improved renal function with the old stent in place  4. Possible that left renal function is so poor that stent is not a factor; will change to lower risk of pyelonephritis and to promote what function from it is possible.

## 2020-02-03 NOTE — ASSESSMENT & PLAN NOTE
Will do renal ultrasound,start gentle hydration, nephrology consult, check renal function in AM     2/1  Nephrology consult obtained   Continue gentle hydration   Serum creatinine is trending down slowly   Monitor volume status   H/O Left ureteral stricture and stent , now presenting with left hydronephrosis . Pt will have left ureteral stent change possibly on Monday 2/3/20 by    2/2  IVF discontinued due to acute exacerbation of diastolic CHF  Monitor volume status   Monitor Renal indices   2/3  Renal indices are better noted in AM labs   Placed back on gentle diuresis   Monitor renal indices

## 2020-02-03 NOTE — ASSESSMENT & PLAN NOTE
Nephrology following and assisting with management. Appreciate input.  Has improved with volume resuscitation.   Strict I/O  Monitor BUN / creatinine  Avoid nephrotoxins medications

## 2020-02-03 NOTE — CONSULTS
Will defer to urology pending stent exchange options.  Please re consult if there is need for perc access.

## 2020-02-03 NOTE — SUBJECTIVE & OBJECTIVE
Interval History: Seen and examined at bedside. Hospital chart reviewed. No acute interval detrimental events noted. He reports that he  has moderately improved. Off NIV.     Review of Systems   Constitutional: Negative for activity change, appetite change and fever.   HENT: Negative for sore throat.    Eyes: Negative for visual disturbance.   Respiratory: Positive for shortness of breath. Negative for cough and chest tightness.    Cardiovascular: Negative for chest pain, palpitations and leg swelling.   Gastrointestinal: Negative for abdominal distention, abdominal pain, constipation, diarrhea, nausea and vomiting.   Endocrine: Negative for polyuria.   Genitourinary: Negative for decreased urine volume, dysuria, flank pain, frequency and hematuria.   Musculoskeletal: Negative for back pain and gait problem.   Skin: Negative for rash.   Neurological: Negative for syncope, speech difficulty, weakness, light-headedness and headaches.   Psychiatric/Behavioral: Negative for confusion, hallucinations and sleep disturbance.           Medications:  Continuous Infusions:  Scheduled Meds:   albuterol-ipratropium  3 mL Nebulization Q6H    amLODIPine  10 mg Oral Daily    carvediloL  25 mg Oral BID WM    doxycycline  100 mg Oral Q12H    ferrous gluconate  324 mg Oral BID WM    folic acid-vit B6-vit B12 2.5-25-2 mg  1 tablet Oral Daily    furosemide  20 mg Oral Daily    pantoprazole  40 mg Oral Daily     PRN Meds:acetaminophen, albuterol-ipratropium, Dextrose 10% Bolus, Dextrose 10% Bolus, glucagon (human recombinant), glucose, glucose, hydrALAZINE, nitroglycerin, sodium chloride 0.9%       Objective:     Vital Signs (Most Recent):  Temp: 97.9 °F (36.6 °C) (02/03/20 1105)  Pulse: 69 (02/03/20 1234)  Resp: 17 (02/03/20 1234)  BP: (!) 143/56 (02/03/20 1105)  SpO2: 99 % (02/03/20 1234) Vital Signs (24h Range):  Temp:  [97.9 °F (36.6 °C)-98.1 °F (36.7 °C)] 97.9 °F (36.6 °C)  Pulse:  [63-90] 69  Resp:  [11-21] 17  SpO2:  [88  %-100 %] 99 %  BP: (115-165)/(44-78) 143/56     Weight: 80.5 kg (177 lb 7.5 oz)  Body mass index is 23.41 kg/m².      Intake/Output Summary (Last 24 hours) at 2/3/2020 1239  Last data filed at 2/3/2020 1100  Gross per 24 hour   Intake 420 ml   Output 2300 ml   Net -1880 ml       Physical Exam   Constitutional: He is oriented to person, place, and time. He appears well-developed and well-nourished. No distress.   HENT:   Head: Normocephalic and atraumatic.   Mouth/Throat: No oropharyngeal exudate.   Eyes: Pupils are equal, round, and reactive to light. Conjunctivae and EOM are normal.   Neck: Normal range of motion. Neck supple. No JVD present. No thyromegaly present.   Cardiovascular: Normal rate, regular rhythm and normal heart sounds.   No murmur heard.  Pulmonary/Chest: Effort normal. No respiratory distress. He has no wheezes. He has no rales. He exhibits no tenderness.   Abdominal: Soft. Bowel sounds are normal. He exhibits no distension. There is no tenderness. There is no rebound and no guarding.   Musculoskeletal: Normal range of motion. He exhibits no edema.   Lymphadenopathy:     He has no cervical adenopathy.   Neurological: He is alert and oriented to person, place, and time. He displays normal reflexes. No cranial nerve deficit or sensory deficit.   Skin: Skin is warm and dry. No rash noted. He is not diaphoretic.   Psychiatric: He has a normal mood and affect.       Vents:  Oxygen Concentration (%): 30 (02/03/20 0500)    Lines/Drains/Airways     Peripheral Intravenous Line                 Peripheral IV - Single Lumen 01/31/20 18 G Left Antecubital 3 days         Peripheral IV - Single Lumen 01/31/20 1923 22 G Right Wrist 2 days                Significant Labs:    CBC/Anemia Profile:  Recent Labs   Lab 02/01/20  1330 02/02/20  0405 02/03/20  0332   WBC  --  3.70* 3.53*   HGB  --  8.7* 8.0*   HCT  --  28.3* 26.2*   PLT  --  201 193   MCV  --  88 89   RDW  --  15.5* 15.8*   IRON 18*  --   --    FERRITIN  311*  --   --    FOLATE 2.3*  --   --    [id number] 255  --   --         Chemistries:  Recent Labs   Lab 02/02/20  0405 02/03/20  0332   * 134*   K 4.0 4.1    103   CO2 20* 21*   BUN 48* 45*   CREATININE 4.2* 3.6*   CALCIUM 8.0* 8.3*   MG 1.2* 2.0       ABGs:   Recent Labs   Lab 02/02/20  0829   PH 7.396   PCO2 34.2*   HCO3 21.0*   POCSATURATED 92*   BE -4     Bilirubin:   Recent Labs   Lab 01/13/20  2037 01/14/20  0729 01/15/20  0505 01/31/20  0034   BILITOT 0.5 0.5 0.5 0.3     Blood Culture:   Recent Labs   Lab 02/01/20  1330   LABBLOO No Growth to date  No Growth to date  No Growth to date  No Growth to date     Troponin:   Recent Labs   Lab 02/02/20  0459   TROPONINI 0.009       Significant Imaging:    CT CHEST WITHOUT CONTRAST: 02/02/20:  Base of Neck: No significant abnormality.    Thoracic soft tissues: Subclavian femoral bypass graft.    Aorta: Atherosclerotic calcifications of the aortic arch.  Coronary artery calcifications.    Heart: Normal size. No effusion.    Pulmonary vasculature: Normal.    Alaina/Mediastinum: Multiple small mediastinal lymph nodes.  The largest lymph nodes are lymph node measuring 1.7 cm in the anterior mediastinum and a pretracheal lymph node measuring 1 point 8 cm in diameter.    Airways: Patent.    Lungs/Pleura: Bilateral pleural effusions small and possibly loculated on the right side.  Small to moderate pleural effusion on the left side.  Atelectasis at the left lung base versus an infiltrate/consolidation.  Scattered airspace disease can be seen throughout the lung fields bilaterally.  Probable diffuse pulmonary fibrotic changes.  No significant bronchiectasis identified.  Extensive subpleural bulla throughout the lung fields but most significantly in the apical region.    Esophagus: Normal.    Upper Abdomen: Marked hydronephrosis and hydroureter on the left.  Stent is present.  Right-sided nephrolithiasis.  Postsurgical changes in the retroperitoneum on the  left.    Bones: Multilevel degenerative thoracic spine.  Old appearing T11 and T12 compression fractures.

## 2020-02-03 NOTE — ASSESSMENT & PLAN NOTE
Patient's baseline creatinine is about 2.0 with is about an function of 20-25%.  Patient's creatinine is low due to poor muscle mass and a BKA.  Check cystatin C for accurate GFR once acute kidney function is better. Overnight events noted and d/w dr. Magaña     Acute kidney injury has improved

## 2020-02-03 NOTE — PROGRESS NOTES
[facility] -   Nephrology  Progress Note    Patient Name: Kodi Ribeiro  MRN: [id number]  Admission Date: 1/31/2020  Hospital Length of Stay: 3 days  Attending Provider: Markell Magaña MD   Primary Care Physician: Norma Nuñez MD  Principal Problem:Acute renal failure superimposed on chronic kidney disease    Subjective:     HPI: Patient is a 70-year-old male with history of hypertension and chronic kidney disease stage 3/stage IV.  Patient's baseline creatinine has fluctuated in the past with multiple episodes of acute kidney injury last time was seen in the hospital was in April of 2019 with a creatinine went up to 4.8 and at times higher.  Since then patient has been having fluctuations in creatinine up to 2.0.  Patient was recently discharged from the hospital.  Patient comes back with shortness of breath.  Was found to have acute kidney injury on chronic kidney disease with a creatinine up to 6.0.  Patient also found to have a hemoglobin drop down to 7.0 and is receiving 1 unit of blood transfusion with gentle hydration.  Patient also has shortness of breath and congestive heart failure.    Interval History:  Shortness of breath has improved.  Urine output has improved.  Acute kidney injury has improved as well pending stent replacement maybe tomorrow    Review of patient's allergies indicates:   Allergen Reactions    Morphine Itching     Current Facility-Administered Medications   Medication Frequency    acetaminophen tablet 500 mg Q6H PRN    albuterol-ipratropium 2.5 mg-0.5 mg/3 mL nebulizer solution 3 mL Q4H PRN    albuterol-ipratropium 2.5 mg-0.5 mg/3 mL nebulizer solution 3 mL Q6H    amLODIPine tablet 10 mg Daily    carvedilol tablet 25 mg BID WM    dextrose 10% (D10W) Bolus PRN    dextrose 10% (D10W) Bolus PRN    doxycycline tablet 100 mg Q12H    ferrous gluconate tablet 324 mg BID WM    folic acid-vit B6-vit B12 2.5-25-2 mg tablet 1 tablet Daily    furosemide tablet 20 mg  Daily    glucagon (human recombinant) injection 1 mg PRN    glucose chewable tablet 16 g PRN    glucose chewable tablet 24 g PRN    hydrALAZINE injection 10 mg Q8H PRN    nitroGLYCERIN SL tablet 0.4 mg Q5 Min PRN    pantoprazole EC tablet 40 mg Daily    sodium chloride 0.9% flush 10 mL PRN       Objective:     Vital Signs (Most Recent):  Temp: 98 °F (36.7 °C) (02/03/20 0705)  Pulse: 64 (02/03/20 0937)  Resp: 17 (02/03/20 0937)  BP: (!) 135/56 (02/03/20 0935)  SpO2: (!) 91 % (02/03/20 0937)  O2 Device (Oxygen Therapy): nasal cannula (02/03/20 0741) Vital Signs (24h Range):  Temp:  [97.9 °F (36.6 °C)-98.1 °F (36.7 °C)] 98 °F (36.7 °C)  Pulse:  [63-90] 64  Resp:  [11-21] 17  SpO2:  [88 %-100 %] 91 %  BP: (115-165)/(44-78) 135/56     Weight: 80.5 kg (177 lb 7.5 oz) (02/02/20 0400)  Body mass index is 23.41 kg/m².  Body surface area is 2.04 meters squared.    I/O last 3 completed shifts:  In: 2710 [P.O.:660; I.V.:2050]  Out: 2250 [Urine:2250]    Physical Exam   Constitutional: He is oriented to person, place, and time. He appears well-developed and well-nourished. No distress.   HENT:   Head: Normocephalic and atraumatic.   Mouth/Throat: No oropharyngeal exudate.   Eyes: Pupils are equal, round, and reactive to light. Conjunctivae and EOM are normal.   Neck: Normal range of motion. Neck supple. No JVD present. No thyromegaly present.   Cardiovascular: Normal rate, regular rhythm and normal heart sounds.   No murmur heard.  Pulmonary/Chest: Effort normal. No respiratory distress. He has no wheezes. He has no rales. He exhibits no tenderness.   Bronchial breath sounds yary  Decreased breath sounds at bases    Abdominal: Soft. Bowel sounds are normal. He exhibits no distension. There is no tenderness. There is no rebound and no guarding.   Musculoskeletal: Normal range of motion. He exhibits no edema.   Lymphadenopathy:     He has no cervical adenopathy.   Neurological: He is alert and oriented to person, place, and  time. He displays normal reflexes. No cranial nerve deficit or sensory deficit.   Skin: Skin is warm and dry. No rash noted. He is not diaphoretic.   Psychiatric: He has a normal mood and affect.       Significant Labs:  All labs within the past 24 hours have been reviewed.     Significant Imaging:      Assessment/Plan:     * Acute renal failure superimposed on chronic kidney disease  Patient's baseline creatinine is about 2.0 with is about an function of 20-25%.  Patient's creatinine is low due to poor muscle mass and a BKA.  Check cystatin C for accurate GFR once acute kidney function is better. Overnight events noted and d/w dr. Magaña     Acute kidney injury has improved        Thank you for your consult.     Brenton Jacobson MD  Nephrology  [facility]

## 2020-02-03 NOTE — PROGRESS NOTES
[facility] -   Pulmonology  Progress Note    Patient Name: Kodi Ribeiro  MRN: [id number]  Admission Date: 1/31/2020  Hospital Length of Stay: 3 days  Code Status: Full Code  Attending Provider: Markell Magaña MD  Primary Care Provider: Norma Nuñez MD   Principal Problem: Acute renal failure superimposed on chronic kidney disease    Subjective:     Interval History: Seen and examined at bedside. Hospital chart reviewed. No acute interval detrimental events noted. He reports that he  has moderately improved. Off NIV.     Review of Systems   Constitutional: Negative for activity change, appetite change and fever.   HENT: Negative for sore throat.    Eyes: Negative for visual disturbance.   Respiratory: Positive for shortness of breath. Negative for cough and chest tightness.    Cardiovascular: Negative for chest pain, palpitations and leg swelling.   Gastrointestinal: Negative for abdominal distention, abdominal pain, constipation, diarrhea, nausea and vomiting.   Endocrine: Negative for polyuria.   Genitourinary: Negative for decreased urine volume, dysuria, flank pain, frequency and hematuria.   Musculoskeletal: Negative for back pain and gait problem.   Skin: Negative for rash.   Neurological: Negative for syncope, speech difficulty, weakness, light-headedness and headaches.   Psychiatric/Behavioral: Negative for confusion, hallucinations and sleep disturbance.           Medications:  Continuous Infusions:  Scheduled Meds:   albuterol-ipratropium  3 mL Nebulization Q6H    amLODIPine  10 mg Oral Daily    carvediloL  25 mg Oral BID WM    doxycycline  100 mg Oral Q12H    ferrous gluconate  324 mg Oral BID WM    folic acid-vit B6-vit B12 2.5-25-2 mg  1 tablet Oral Daily    furosemide  20 mg Oral Daily    pantoprazole  40 mg Oral Daily     PRN Meds:acetaminophen, albuterol-ipratropium, Dextrose 10% Bolus, Dextrose 10% Bolus, glucagon (human recombinant), glucose, glucose, hydrALAZINE,  nitroglycerin, sodium chloride 0.9%       Objective:     Vital Signs (Most Recent):  Temp: 97.9 °F (36.6 °C) (02/03/20 1105)  Pulse: 69 (02/03/20 1234)  Resp: 17 (02/03/20 1234)  BP: (!) 143/56 (02/03/20 1105)  SpO2: 99 % (02/03/20 1234) Vital Signs (24h Range):  Temp:  [97.9 °F (36.6 °C)-98.1 °F (36.7 °C)] 97.9 °F (36.6 °C)  Pulse:  [63-90] 69  Resp:  [11-21] 17  SpO2:  [88 %-100 %] 99 %  BP: (115-165)/(44-78) 143/56     Weight: 80.5 kg (177 lb 7.5 oz)  Body mass index is 23.41 kg/m².      Intake/Output Summary (Last 24 hours) at 2/3/2020 1239  Last data filed at 2/3/2020 1100  Gross per 24 hour   Intake 420 ml   Output 2300 ml   Net -1880 ml       Physical Exam   Constitutional: He is oriented to person, place, and time. He appears well-developed and well-nourished. No distress.   HENT:   Head: Normocephalic and atraumatic.   Mouth/Throat: No oropharyngeal exudate.   Eyes: Pupils are equal, round, and reactive to light. Conjunctivae and EOM are normal.   Neck: Normal range of motion. Neck supple. No JVD present. No thyromegaly present.   Cardiovascular: Normal rate, regular rhythm and normal heart sounds.   No murmur heard.  Pulmonary/Chest: Effort normal. No respiratory distress. He has no wheezes. He has no rales. He exhibits no tenderness.   Abdominal: Soft. Bowel sounds are normal. He exhibits no distension. There is no tenderness. There is no rebound and no guarding.   Musculoskeletal: Normal range of motion. He exhibits no edema.   Lymphadenopathy:     He has no cervical adenopathy.   Neurological: He is alert and oriented to person, place, and time. He displays normal reflexes. No cranial nerve deficit or sensory deficit.   Skin: Skin is warm and dry. No rash noted. He is not diaphoretic.   Psychiatric: He has a normal mood and affect.       Vents:  Oxygen Concentration (%): 30 (02/03/20 0500)    Lines/Drains/Airways     Peripheral Intravenous Line                 Peripheral IV - Single Lumen 01/31/20 18 G  Left Antecubital 3 days         Peripheral IV - Single Lumen 01/31/20 1923 22 G Right Wrist 2 days                Significant Labs:    CBC/Anemia Profile:  Recent Labs   Lab 02/01/20  1330 02/02/20  0405 02/03/20  0332   WBC  --  3.70* 3.53*   HGB  --  8.7* 8.0*   HCT  --  28.3* 26.2*   PLT  --  201 193   MCV  --  88 89   RDW  --  15.5* 15.8*   IRON 18*  --   --    FERRITIN 311*  --   --    FOLATE 2.3*  --   --    [id number] 255  --   --         Chemistries:  Recent Labs   Lab 02/02/20  0405 02/03/20  0332   * 134*   K 4.0 4.1    103   CO2 20* 21*   BUN 48* 45*   CREATININE 4.2* 3.6*   CALCIUM 8.0* 8.3*   MG 1.2* 2.0       ABGs:   Recent Labs   Lab 02/02/20  0829   PH 7.396   PCO2 34.2*   HCO3 21.0*   POCSATURATED 92*   BE -4     Bilirubin:   Recent Labs   Lab 01/13/20  2037 01/14/20  0729 01/15/20  0505 01/31/20  0034   BILITOT 0.5 0.5 0.5 0.3     Blood Culture:   Recent Labs   Lab 02/01/20  1330   LABBLOO No Growth to date  No Growth to date  No Growth to date  No Growth to date     Troponin:   Recent Labs   Lab 02/02/20  0459   TROPONINI 0.009       Significant Imaging:    CT CHEST WITHOUT CONTRAST: 02/02/20:  Base of Neck: No significant abnormality.    Thoracic soft tissues: Subclavian femoral bypass graft.    Aorta: Atherosclerotic calcifications of the aortic arch.  Coronary artery calcifications.    Heart: Normal size. No effusion.    Pulmonary vasculature: Normal.    Alaina/Mediastinum: Multiple small mediastinal lymph nodes.  The largest lymph nodes are lymph node measuring 1.7 cm in the anterior mediastinum and a pretracheal lymph node measuring 1 point 8 cm in diameter.    Airways: Patent.    Lungs/Pleura: Bilateral pleural effusions small and possibly loculated on the right side.  Small to moderate pleural effusion on the left side.  Atelectasis at the left lung base versus an infiltrate/consolidation.  Scattered airspace disease can be seen throughout the lung fields bilaterally.  Probable  diffuse pulmonary fibrotic changes.  No significant bronchiectasis identified.  Extensive subpleural bulla throughout the lung fields but most significantly in the apical region.    Esophagus: Normal.    Upper Abdomen: Marked hydronephrosis and hydroureter on the left.  Stent is present.  Right-sided nephrolithiasis.  Postsurgical changes in the retroperitoneum on the left.    Bones: Multilevel degenerative thoracic spine.  Old appearing T11 and T12 compression fractures.                ABG  Recent Labs   Lab 02/02/20  0829   PH 7.396   PO2 63*   PCO2 34.2*   HCO3 21.0*   BE -4     Assessment/Plan:     * Acute renal failure superimposed on chronic kidney disease  Nephrology following and assisting with management. Appreciate input.  Has improved with volume resuscitation.   Strict I/O  Monitor BUN / creatinine  Avoid nephrotoxins medications    Acute respiratory failure with hypoxia  Supplement oxygenation. Keep SAO2 >= 92%. BIPAP 10/5 QHS and PRN. Bronchodilators per orders    Hydronephrosis of left kidney  Not felt to be acute cause of MARCELA  Likely new ureteral stent at some point  If acute issues, consider percutaneous nephrostomy tube      Continue current ongoing medical management. OK to transfer patient out of the ICU. Will sign off upon ICU transfer. Please re consult if further pulmonary issues arise.     Severo Bronson MD  Pulmonology  [facility]

## 2020-02-03 NOTE — ASSESSMENT & PLAN NOTE
-H/O Left ureteral stricture and ureteral stent . Will have left ureteral stent change possibly on Monday by    2/3  Left ureteral stent exchange tomorrow

## 2020-02-03 NOTE — PROGRESS NOTES
[facility] Hospital Medicine  Progress Note    Patient Name: Kodi Ribeiro  MRN: [id number]  Patient Class: IP- Inpatient   Admission Date: 1/31/2020  Length of Stay: 3 days  Attending Physician: Markell Magaña MD  Primary Care Provider: Norma Nuñez MD        Subjective:     Principal Problem:Acute renal failure superimposed on chronic kidney disease        HPI:   69 year old man with extensive past medical and surgery history of    stroke, spinal cord disease, peripheral vascular disease, MI (per patient, 2000 & 2012), lipoma of colon, infection of aortic graft, HTN, HLD, hyperglycemia, horseshoe kidney, hemorrhoids, GERD. He was recently discharged from the Hospital on  1/15/2020. He presented at this time with chest pain that started few hours prior to presentation.Pain was described a sharp ,non radiating ,not associated with diaphoresis and worsened with exertion. He called his wife and EMS was called.   Since admission, Lab data showed   Component      Latest Ref Rng & Units 1/31/2020 1/30/2020 1/15/2020               Hemoglobin      14.0 - 18.0 g/dL 6.9 (L) 7.0 (L) 7.7 (L)     Component      Latest Ref Rng & Units 1/31/2020 1/30/2020 1/15/2020               Creatinine      0.5 - 1.4 mg/dL 6.6 (H) 6.0 (H) 2.4 (H)   He also reported increased pain from the left foot ulceration and when the dressing was opened-sánchez pus was seen .  He will be admitted for symptomatic anemia and acute renal failure .    Overview/Hospital Course:  1/31-  Severe left sided hydronephrosis with ureteral stent visualized. In setting of acute renal failure concern with this being cause. This was discussed with Dr. Brennen Maldonado who feels that hydronephrosis is a chronic issue and not sole cause of acute renal failure. Patient already has stent and may need IR consult for nephrostomy tube placement. Will consult IR on case. Attempted to contact patient's urologist earlier today Dr. Jin, left voicemail. Will  dc asa and plavix due to pending procedure. Currently receiving 1 unit prbc. Will transfuse < 7.      Discussed case with Dr. Jin. Concerned with outlet obstruction. Recommended getting bladder scan which was negative. Recommendations were to workup other causes of renal failure as left sided hydronephrosis alone should not cause acute renal failure with the right kidney working.      Started sterile water with 150meq bicarb this am. Cont to monitor urine output. Fena calculated was 2.2% indicating intrinsic renal disease.      Case was discussed with IR earlier. Concerned with bleeding due to asa and plavix use. Does not recommend nephrostomy tube at this time due to risks. If any procedure needs to be done, recommended ureteral stent changed instead as it is less invasive.     2/1  Pt has no new C/O today . SOB is at baseline . On O2 at 2L/min. BNP is elevated noted .  Continued on gentle hydration with Bicarb . Serum creatinine is slightly better 6.6>5.3   Off of diuretics , ASA and Plavix  Possible ureteral stent change on Monday by  (Urologist)     S/P 1 unit of P-RBC yesterday  . Pt has H/O   chronic iron def anemia and is followed in the Hematology clinic on a regular basis . Baseline Hemoglobin ranges 8 to 9.   Bone scan suspicious for left foot osteomyelitis possibly chronic and does not appear an active issue at this time. Podiatry consult obtained and plan is noted   Will D/C broad spectrum antibiotic . Pt does have left foot ulceration and will place pt on Oral Doxycycline for 5 to  7 days.       2/2-  Last night event  noted. Pt was transferred to ICU overnight due to acute worsening of hypoxia required BiPAP therapy for oxygenation .  IVF discontinued . Pt had received Lasix 60 mg IV x 1 dose   Serum creatinine down to 4.2   Repeat U/S kidneys again showed Left sided hydronephrosis which is unchanged from prior study on admit     CT chest without contrast revealed yary pleural effusion  L>R, airspace disease involving yary lung bases , evidence of pulmonary fibrosis , subpleural bulla and centrilobular emphysema     2/3  SOB is better . Currently on O2 at 2L/min . Downgrade to Telemetry today   Urology consult obtained . CT abd /pelvis revealed interval worsening appearance of chronic severe left-sided hydronephrosis, and progression of severe atrophy of the left renal cortex. Left-sided double-J ureteral stent in satisfactory position. Per Urology left ureteral stent exchange tomorrow to lower risk of  pyelonephritis and to preserve remaining left kidney function.     Serum creatinine is trending down suggestive of renal function recovery . 42> 3.6   Urine output is satisfactory     Placed back on gentle diuresis due to cardiac decompensation   CT chest c/w emphysema. Continue bronchodilator treatment     Interval History:   SOB is better . Currently on O2 at 2L/min . Downgrade to Telemetry today   Urology consult obtained . CT abd /pelvis revealed interval worsening appearance of chronic severe left-sided hydronephrosis, and progression of severe atrophy of the left renal cortex. Left-sided double-J ureteral stent in satisfactory position. Per Urology left ureteral stent exchange tomorrow to lower risk of  pyelonephritis and to preserve remaining left kidney function.     Serum creatinine is trending down suggestive of renal function recovery . 42> 3.6   Urine output is satisfactory         Review of Systems   Constitutional: Negative for activity change, appetite change and fever.   HENT: Negative for sore throat.    Eyes: Negative for visual disturbance.   Respiratory: Positive for shortness of breath (better ). Negative for cough and chest tightness.    Cardiovascular: Negative for chest pain, palpitations and leg swelling.   Gastrointestinal: Negative for abdominal distention, abdominal pain, constipation, diarrhea, nausea and vomiting.   Endocrine: Negative for polyuria.   Genitourinary:  Negative for decreased urine volume, dysuria, flank pain, frequency and hematuria.   Musculoskeletal: Negative for back pain and gait problem.   Skin: Negative for rash.   Neurological: Negative for syncope, speech difficulty, weakness, light-headedness and headaches.   Psychiatric/Behavioral: Negative for confusion, hallucinations and sleep disturbance.     Objective:     Vital Signs (Most Recent):  Temp: 97.9 °F (36.6 °C) (02/03/20 1105)  Pulse: 66 (02/03/20 1335)  Resp: 13 (02/03/20 1335)  BP: (!) 129/53 (02/03/20 1335)  SpO2: 95 % (02/03/20 1335) Vital Signs (24h Range):  Temp:  [97.9 °F (36.6 °C)-98.1 °F (36.7 °C)] 97.9 °F (36.6 °C)  Pulse:  [63-90] 66  Resp:  [11-21] 13  SpO2:  [88 %-100 %] 95 %  BP: (115-165)/(44-78) 129/53     Weight: 80.5 kg (177 lb 7.5 oz)  Body mass index is 23.41 kg/m².    Intake/Output Summary (Last 24 hours) at 2/3/2020 1416  Last data filed at 2/3/2020 1300  Gross per 24 hour   Intake 900 ml   Output 2050 ml   Net -1150 ml      Physical Exam   Constitutional: He is oriented to person, place, and time. He appears well-developed and well-nourished. No distress.   HENT:   Head: Normocephalic and atraumatic.   Mouth/Throat: No oropharyngeal exudate.   Eyes: Pupils are equal, round, and reactive to light. Conjunctivae and EOM are normal.   Neck: Normal range of motion. Neck supple. No JVD present. No thyromegaly present.   Cardiovascular: Normal rate, regular rhythm and normal heart sounds.   No murmur heard.  Pulmonary/Chest: Effort normal and breath sounds normal. No respiratory distress. He has no wheezes. He has no rales. He exhibits no tenderness.   Abdominal: Soft. Bowel sounds are normal. He exhibits no distension. There is no tenderness. There is no rebound and no guarding.   Musculoskeletal: Normal range of motion.   S/P RT BKA , Left foot transmetatarsal amputation    Lymphadenopathy:     He has no cervical adenopathy.   Neurological: He is alert and oriented to person, place,  and time. He displays normal reflexes. No cranial nerve deficit or sensory deficit.   Skin: Skin is warm and dry. No rash noted. He is not diaphoretic.   Psychiatric: He has a normal mood and affect.       Significant Labs:   CBC:   Recent Labs   Lab 02/02/20  0405 02/03/20  0332   WBC 3.70* 3.53*   HGB 8.7* 8.0*   HCT 28.3* 26.2*    193     CMP:   Recent Labs   Lab 02/02/20  0405 02/03/20  0332   * 134*   K 4.0 4.1    103   CO2 20* 21*   GLU 96 85   BUN 48* 45*   CREATININE 4.2* 3.6*   CALCIUM 8.0* 8.3*   ANIONGAP 13 10   EGFRNONAA 13* 16*       Significant Imaging: CT abd/pelvis -    Horseshoe kidney again noted.  Unchanged chronic severe cortical atrophy of the midline component of the horseshoe kidney compared to 08/11/2017. Unchanged normal appearance of the right kidney, without hydronephrosis.    Compared to prior study of 08/11/2017, interval worsening appearance of chronic severe left-sided hydronephrosis, and progression of severe atrophy of the left renal cortex. Left-sided double-J ureteral stent in satisfactory position.      Assessment/Plan:      * Acute renal failure superimposed on chronic kidney disease    Will do renal ultrasound,start gentle hydration, nephrology consult, check renal function in AM     2/1  Nephrology consult obtained   Continue gentle hydration   Serum creatinine is trending down slowly   Monitor volume status   H/O Left ureteral stricture and stent , now presenting with left hydronephrosis . Pt will have left ureteral stent change possibly on Monday 2/3/20 by    2/2  IVF discontinued due to acute exacerbation of diastolic CHF  Monitor volume status   Monitor Renal indices   2/3  Renal indices are better noted in AM labs   Placed back on gentle diuresis   Monitor renal indices     Hydronephrosis of left kidney  -H/O Left ureteral stricture and ureteral stent . Will have left ureteral stent change possibly on Monday by    2/3  Left  ureteral stent exchange tomorrow       Acute respiratory failure with hypoxia  2/2  Overnight pt developed worsening SOB and hypoxia likely due to acute exacerbation of Chronic diastolic HF  IVF discontinued   Pt had received Lasix 60 mg IV x 1 dose   Monitor volume status   Monitor renal indices   Wean FiO2 as tolerated   CT chest revealed yary pleural effusion L>R, airspace disease involving yary lung bases , evidence of pulmonary fibrosis , subpleural bulla and centrilobular emphysema    Bronchodilator treatment    2/3  Improving   Continue gentle diuresis  Wean  FiO2   Continue bronchodilators         Acute on chronic diastolic heart failure  2/2  Acute decompensation noted overnight   IVF discontinued   Pt had received Lasix 60 mg IV x 1 dose   Clinical improvement noted with diuresis   Monitor volume status   Monitor renal indices   2/3  Start gentle diuresis with oral Furosemide 20 mg daily       Acute on chronic anemia    Will transfuse one unit of packed cell-monitor cbc in AM    2/1  Get iron study , B12, Folic  acid.   2/2, 2/3   Low B12, Folic acid and low iron noted .Repalce b12, folic acid and iron orally     CKD (chronic kidney disease) stage 3, GFR 30-59 ml/min  - Monitor renal indices       PVD (peripheral vascular disease)        Foot ulcer - Left Foot    Will do wound culture , podiatry consult -will need to rule out osteomyelitis , will do bone scan in AM   WILL start Rocephin/zyvox  2/1  D/C Rocephin and Zyvox   Start oral Doxycycline 100 mg po   2/2, 2/3  As above     Essential hypertension    Will continue home medication of norvasc    Ureteral stent retained  2/3  Stent exchange tomorrow         VTE Risk Mitigation (From admission, onward)         Ordered     Reason for No Pharmacological VTE Prophylaxis  Once     Question:  Reasons:  Answer:  Physician Provided (leave comment)    01/31/20 0317     IP VTE HIGH RISK PATIENT  Once      01/31/20 0317                      Markell Magaña  MD  Department of Hospital Medicine   [facility]

## 2020-02-03 NOTE — ASSESSMENT & PLAN NOTE
2/2  Acute decompensation noted overnight   IVF discontinued   Pt had received Lasix 60 mg IV x 1 dose   Clinical improvement noted with diuresis   Monitor volume status   Monitor renal indices   2/3  Start gentle diuresis with oral Furosemide 20 mg daily

## 2020-02-04 ENCOUNTER — ANESTHESIA (OUTPATIENT)
Dept: SURGERY | Facility: HOSPITAL | Age: 70
DRG: 659 | End: 2020-02-04
Payer: MEDICARE

## 2020-02-04 ENCOUNTER — ANESTHESIA EVENT (OUTPATIENT)
Dept: SURGERY | Facility: HOSPITAL | Age: 70
DRG: 659 | End: 2020-02-04
Payer: MEDICARE

## 2020-02-04 LAB
ANION GAP SERPL CALC-SCNC: 12 MMOL/L (ref 8–16)
BACTERIA SPEC ANAEROBE CULT: ABNORMAL
BASOPHILS # BLD AUTO: 0.03 K/UL (ref 0–0.2)
BASOPHILS NFR BLD: 0.7 % (ref 0–1.9)
BUN SERPL-MCNC: 32 MG/DL (ref 8–23)
CALCIUM SERPL-MCNC: 8.8 MG/DL (ref 8.7–10.5)
CHLORIDE SERPL-SCNC: 104 MMOL/L (ref 95–110)
CO2 SERPL-SCNC: 21 MMOL/L (ref 23–29)
CREAT SERPL-MCNC: 2.5 MG/DL (ref 0.5–1.4)
DIFFERENTIAL METHOD: ABNORMAL
EOSINOPHIL # BLD AUTO: 0.3 K/UL (ref 0–0.5)
EOSINOPHIL NFR BLD: 6.2 % (ref 0–8)
ERYTHROCYTE [DISTWIDTH] IN BLOOD BY AUTOMATED COUNT: 15.3 % (ref 11.5–14.5)
EST. GFR  (AFRICAN AMERICAN): 29 ML/MIN/1.73 M^2
EST. GFR  (NON AFRICAN AMERICAN): 25 ML/MIN/1.73 M^2
GLUCOSE SERPL-MCNC: 90 MG/DL (ref 70–110)
HCT VFR BLD AUTO: 28.8 % (ref 40–54)
HGB BLD-MCNC: 8.8 G/DL (ref 14–18)
IMM GRANULOCYTES # BLD AUTO: 0.02 K/UL (ref 0–0.04)
IMM GRANULOCYTES NFR BLD AUTO: 0.5 % (ref 0–0.5)
LYMPHOCYTES # BLD AUTO: 0.6 K/UL (ref 1–4.8)
LYMPHOCYTES NFR BLD: 14.3 % (ref 18–48)
MAGNESIUM SERPL-MCNC: 1.8 MG/DL (ref 1.6–2.6)
MCH RBC QN AUTO: 26.9 PG (ref 27–31)
MCHC RBC AUTO-ENTMCNC: 30.6 G/DL (ref 32–36)
MCV RBC AUTO: 88 FL (ref 82–98)
MONOCYTES # BLD AUTO: 0.3 K/UL (ref 0.3–1)
MONOCYTES NFR BLD: 6.7 % (ref 4–15)
NEUTROPHILS # BLD AUTO: 2.9 K/UL (ref 1.8–7.7)
NEUTROPHILS NFR BLD: 71.6 % (ref 38–73)
NRBC BLD-RTO: 0 /100 WBC
PLATELET # BLD AUTO: 222 K/UL (ref 150–350)
PMV BLD AUTO: 10.2 FL (ref 9.2–12.9)
POTASSIUM SERPL-SCNC: 4.3 MMOL/L (ref 3.5–5.1)
RBC # BLD AUTO: 3.27 M/UL (ref 4.6–6.2)
SODIUM SERPL-SCNC: 137 MMOL/L (ref 136–145)
WBC # BLD AUTO: 4.05 K/UL (ref 3.9–12.7)

## 2020-02-04 PROCEDURE — 99232 SBSQ HOSP IP/OBS MODERATE 35: CPT | Mod: HCNC,,, | Performed by: UROLOGY

## 2020-02-04 PROCEDURE — 52332 PR CYSTOSCOPY,INSERT URETERAL STENT: ICD-10-PCS | Mod: HCNC,LT,, | Performed by: UROLOGY

## 2020-02-04 PROCEDURE — 63600175 PHARM REV CODE 636 W HCPCS: Mod: HCNC | Performed by: UROLOGY

## 2020-02-04 PROCEDURE — 25000003 PHARM REV CODE 250: Mod: HCNC | Performed by: NURSE PRACTITIONER

## 2020-02-04 PROCEDURE — 71000033 HC RECOVERY, INTIAL HOUR: Mod: HCNC | Performed by: UROLOGY

## 2020-02-04 PROCEDURE — 99900035 HC TECH TIME PER 15 MIN (STAT): Mod: HCNC

## 2020-02-04 PROCEDURE — 25000242 PHARM REV CODE 250 ALT 637 W/ HCPCS: Mod: HCNC | Performed by: NURSE PRACTITIONER

## 2020-02-04 PROCEDURE — 36000706: Mod: HCNC | Performed by: UROLOGY

## 2020-02-04 PROCEDURE — 63600175 PHARM REV CODE 636 W HCPCS: Mod: HCNC | Performed by: NURSE ANESTHETIST, CERTIFIED REGISTERED

## 2020-02-04 PROCEDURE — 80048 BASIC METABOLIC PNL TOTAL CA: CPT | Mod: HCNC

## 2020-02-04 PROCEDURE — 63600175 PHARM REV CODE 636 W HCPCS: Mod: HCNC | Performed by: INTERNAL MEDICINE

## 2020-02-04 PROCEDURE — 25000003 PHARM REV CODE 250: Mod: HCNC | Performed by: INTERNAL MEDICINE

## 2020-02-04 PROCEDURE — 99232 PR SUBSEQUENT HOSPITAL CARE,LEVL II: ICD-10-PCS | Mod: HCNC,,, | Performed by: INTERNAL MEDICINE

## 2020-02-04 PROCEDURE — 36000707: Mod: HCNC | Performed by: UROLOGY

## 2020-02-04 PROCEDURE — 82610 CYSTATIN C: CPT | Mod: HCNC

## 2020-02-04 PROCEDURE — 52332 CYSTOSCOPY AND TREATMENT: CPT | Mod: HCNC,LT,, | Performed by: UROLOGY

## 2020-02-04 PROCEDURE — 94640 AIRWAY INHALATION TREATMENT: CPT | Mod: HCNC

## 2020-02-04 PROCEDURE — C2617 STENT, NON-COR, TEM W/O DEL: HCPCS | Mod: HCNC | Performed by: UROLOGY

## 2020-02-04 PROCEDURE — 99232 PR SUBSEQUENT HOSPITAL CARE,LEVL II: ICD-10-PCS | Mod: HCNC,,, | Performed by: UROLOGY

## 2020-02-04 PROCEDURE — 27000221 HC OXYGEN, UP TO 24 HOURS: Mod: HCNC

## 2020-02-04 PROCEDURE — 37000009 HC ANESTHESIA EA ADD 15 MINS: Mod: HCNC | Performed by: UROLOGY

## 2020-02-04 PROCEDURE — C1769 GUIDE WIRE: HCPCS | Mod: HCNC | Performed by: UROLOGY

## 2020-02-04 PROCEDURE — 36415 COLL VENOUS BLD VENIPUNCTURE: CPT | Mod: HCNC

## 2020-02-04 PROCEDURE — 27000190 HC CPAP FULL FACE MASK W/VALVE: Mod: HCNC

## 2020-02-04 PROCEDURE — 21400001 HC TELEMETRY ROOM: Mod: HCNC

## 2020-02-04 PROCEDURE — 94761 N-INVAS EAR/PLS OXIMETRY MLT: CPT | Mod: HCNC

## 2020-02-04 PROCEDURE — 83735 ASSAY OF MAGNESIUM: CPT | Mod: HCNC

## 2020-02-04 PROCEDURE — 94660 CPAP INITIATION&MGMT: CPT | Mod: HCNC

## 2020-02-04 PROCEDURE — 37000008 HC ANESTHESIA 1ST 15 MINUTES: Mod: HCNC | Performed by: UROLOGY

## 2020-02-04 PROCEDURE — 25000003 PHARM REV CODE 250: Mod: HCNC | Performed by: UROLOGY

## 2020-02-04 PROCEDURE — 25000242 PHARM REV CODE 250 ALT 637 W/ HCPCS: Mod: HCNC | Performed by: UROLOGY

## 2020-02-04 PROCEDURE — 85025 COMPLETE CBC W/AUTO DIFF WBC: CPT | Mod: HCNC

## 2020-02-04 PROCEDURE — 99232 SBSQ HOSP IP/OBS MODERATE 35: CPT | Mod: HCNC,,, | Performed by: INTERNAL MEDICINE

## 2020-02-04 DEVICE — STENT URETERAL UNIV 7FR 26CM: Type: IMPLANTABLE DEVICE | Site: URETER | Status: FUNCTIONAL

## 2020-02-04 RX ORDER — FENTANYL CITRATE 50 UG/ML
INJECTION, SOLUTION INTRAMUSCULAR; INTRAVENOUS
Status: DISCONTINUED | OUTPATIENT
Start: 2020-02-04 | End: 2020-02-04

## 2020-02-04 RX ORDER — PROPOFOL 10 MG/ML
VIAL (ML) INTRAVENOUS
Status: DISCONTINUED | OUTPATIENT
Start: 2020-02-04 | End: 2020-02-04

## 2020-02-04 RX ORDER — LIDOCAINE HYDROCHLORIDE 20 MG/ML
INJECTION INTRAVENOUS
Status: DISCONTINUED | OUTPATIENT
Start: 2020-02-04 | End: 2020-02-04

## 2020-02-04 RX ORDER — HYDRALAZINE HYDROCHLORIDE 10 MG/1
10 TABLET, FILM COATED ORAL EVERY 12 HOURS
Status: DISCONTINUED | OUTPATIENT
Start: 2020-02-04 | End: 2020-02-06 | Stop reason: HOSPADM

## 2020-02-04 RX ORDER — ONDANSETRON 2 MG/ML
4 INJECTION INTRAMUSCULAR; INTRAVENOUS DAILY PRN
Status: DISCONTINUED | OUTPATIENT
Start: 2020-02-04 | End: 2020-02-04 | Stop reason: HOSPADM

## 2020-02-04 RX ORDER — FUROSEMIDE 10 MG/ML
20 INJECTION INTRAMUSCULAR; INTRAVENOUS ONCE
Status: COMPLETED | OUTPATIENT
Start: 2020-02-04 | End: 2020-02-04

## 2020-02-04 RX ORDER — ISOSORBIDE MONONITRATE 60 MG/1
120 TABLET, EXTENDED RELEASE ORAL DAILY
Status: DISCONTINUED | OUTPATIENT
Start: 2020-02-04 | End: 2020-02-06 | Stop reason: HOSPADM

## 2020-02-04 RX ORDER — FENTANYL CITRATE 50 UG/ML
25 INJECTION, SOLUTION INTRAMUSCULAR; INTRAVENOUS EVERY 5 MIN PRN
Status: DISCONTINUED | OUTPATIENT
Start: 2020-02-04 | End: 2020-02-04 | Stop reason: HOSPADM

## 2020-02-04 RX ORDER — ALPRAZOLAM 0.5 MG/1
0.5 TABLET ORAL 3 TIMES DAILY PRN
Status: DISCONTINUED | OUTPATIENT
Start: 2020-02-04 | End: 2020-02-06 | Stop reason: HOSPADM

## 2020-02-04 RX ORDER — SODIUM CHLORIDE, SODIUM LACTATE, POTASSIUM CHLORIDE, CALCIUM CHLORIDE 600; 310; 30; 20 MG/100ML; MG/100ML; MG/100ML; MG/100ML
INJECTION, SOLUTION INTRAVENOUS CONTINUOUS PRN
Status: DISCONTINUED | OUTPATIENT
Start: 2020-02-04 | End: 2020-02-04

## 2020-02-04 RX ORDER — ENOXAPARIN SODIUM 100 MG/ML
30 INJECTION SUBCUTANEOUS EVERY 24 HOURS
Status: DISCONTINUED | OUTPATIENT
Start: 2020-02-04 | End: 2020-02-05 | Stop reason: DRUGHIGH

## 2020-02-04 RX ORDER — CEFAZOLIN SODIUM 2 G/50ML
2 SOLUTION INTRAVENOUS ONCE
Status: COMPLETED | OUTPATIENT
Start: 2020-02-04 | End: 2020-02-04

## 2020-02-04 RX ADMIN — CARVEDILOL 25 MG: 12.5 TABLET, FILM COATED ORAL at 07:02

## 2020-02-04 RX ADMIN — ACETAMINOPHEN 500 MG: 500 TABLET ORAL at 01:02

## 2020-02-04 RX ADMIN — Medication 100 MG: at 09:02

## 2020-02-04 RX ADMIN — PROPOFOL 60 MG: 10 INJECTION, EMULSION INTRAVENOUS at 09:02

## 2020-02-04 RX ADMIN — HYDRALAZINE HYDROCHLORIDE 10 MG: 10 TABLET, FILM COATED ORAL at 09:02

## 2020-02-04 RX ADMIN — ALPRAZOLAM 0.5 MG: 0.5 TABLET ORAL at 09:02

## 2020-02-04 RX ADMIN — IPRATROPIUM BROMIDE AND ALBUTEROL SULFATE 3 ML: .5; 3 SOLUTION RESPIRATORY (INHALATION) at 01:02

## 2020-02-04 RX ADMIN — AMLODIPINE BESYLATE 10 MG: 10 TABLET ORAL at 11:02

## 2020-02-04 RX ADMIN — GUAIFENESIN 200 MG: 200 SOLUTION ORAL at 11:02

## 2020-02-04 RX ADMIN — PANTOPRAZOLE SODIUM 40 MG: 40 TABLET, DELAYED RELEASE ORAL at 11:02

## 2020-02-04 RX ADMIN — FENTANYL CITRATE 100 MCG: 50 INJECTION, SOLUTION INTRAMUSCULAR; INTRAVENOUS at 09:02

## 2020-02-04 RX ADMIN — FERROUS GLUCONATE TAB 324 MG (37.5 MG ELEMENTAL IRON) 324 MG: 324 (37.5 FE) TAB at 07:02

## 2020-02-04 RX ADMIN — ALPRAZOLAM 0.5 MG: 0.5 TABLET ORAL at 12:02

## 2020-02-04 RX ADMIN — GUAIFENESIN 200 MG: 200 SOLUTION ORAL at 01:02

## 2020-02-04 RX ADMIN — SODIUM CHLORIDE, SODIUM LACTATE, POTASSIUM CHLORIDE, AND CALCIUM CHLORIDE: 600; 310; 30; 20 INJECTION, SOLUTION INTRAVENOUS at 09:02

## 2020-02-04 RX ADMIN — IPRATROPIUM BROMIDE AND ALBUTEROL SULFATE 3 ML: .5; 3 SOLUTION RESPIRATORY (INHALATION) at 11:02

## 2020-02-04 RX ADMIN — FUROSEMIDE 20 MG: 20 TABLET ORAL at 11:02

## 2020-02-04 RX ADMIN — DOXYCYCLINE HYCLATE 100 MG: 100 TABLET, COATED ORAL at 09:02

## 2020-02-04 RX ADMIN — CEFAZOLIN SODIUM 2 G: 2 SOLUTION INTRAVENOUS at 09:02

## 2020-02-04 RX ADMIN — ENOXAPARIN SODIUM 30 MG: 100 INJECTION SUBCUTANEOUS at 04:02

## 2020-02-04 RX ADMIN — DOXYCYCLINE HYCLATE 100 MG: 100 TABLET, COATED ORAL at 11:02

## 2020-02-04 RX ADMIN — IPRATROPIUM BROMIDE AND ALBUTEROL SULFATE 3 ML: .5; 3 SOLUTION RESPIRATORY (INHALATION) at 06:02

## 2020-02-04 RX ADMIN — Medication 1 TABLET: at 11:02

## 2020-02-04 RX ADMIN — ISOSORBIDE MONONITRATE 120 MG: 60 TABLET, EXTENDED RELEASE ORAL at 04:02

## 2020-02-04 RX ADMIN — FERROUS GLUCONATE TAB 324 MG (37.5 MG ELEMENTAL IRON) 324 MG: 324 (37.5 FE) TAB at 04:02

## 2020-02-04 RX ADMIN — IPRATROPIUM BROMIDE AND ALBUTEROL SULFATE 3 ML: .5; 3 SOLUTION RESPIRATORY (INHALATION) at 12:02

## 2020-02-04 RX ADMIN — FUROSEMIDE 20 MG: 10 INJECTION, SOLUTION INTRAMUSCULAR; INTRAVENOUS at 12:02

## 2020-02-04 RX ADMIN — NITROGLYCERIN 0.4 MG: 0.4 TABLET, ORALLY DISINTEGRATING SUBLINGUAL at 10:02

## 2020-02-04 RX ADMIN — GUAIFENESIN 200 MG: 200 SOLUTION ORAL at 07:02

## 2020-02-04 RX ADMIN — CARVEDILOL 25 MG: 12.5 TABLET, FILM COATED ORAL at 04:02

## 2020-02-04 NOTE — SUBJECTIVE & OBJECTIVE
Interval History: MARCELA better ; getting ureteric stent changed today     Review of patient's allergies indicates:   Allergen Reactions    Morphine Itching     Current Facility-Administered Medications   Medication Frequency    acetaminophen tablet 500 mg Q6H PRN    albuterol-ipratropium 2.5 mg-0.5 mg/3 mL nebulizer solution 3 mL Q4H PRN    albuterol-ipratropium 2.5 mg-0.5 mg/3 mL nebulizer solution 3 mL Q6H    amLODIPine tablet 10 mg Daily    carvedilol tablet 25 mg BID WM    cefazolin (ANCEF) 2 gram in dextrose 5% 50 mL IVPB (premix) Once    dextrose 10% (D10W) Bolus PRN    dextrose 10% (D10W) Bolus PRN    doxycycline tablet 100 mg Q12H    ferrous gluconate tablet 324 mg BID WM    folic acid-vit B6-vit B12 2.5-25-2 mg tablet 1 tablet Daily    furosemide tablet 20 mg Daily    glucagon (human recombinant) injection 1 mg PRN    glucose chewable tablet 16 g PRN    glucose chewable tablet 24 g PRN    guaifenesin 100 mg/5 ml syrup 200 mg Q4H PRN    hydrALAZINE injection 10 mg Q8H PRN    nitroGLYCERIN SL tablet 0.4 mg Q5 Min PRN    pantoprazole EC tablet 40 mg Daily    sodium chloride 0.9% flush 10 mL PRN       Objective:     Vital Signs (Most Recent):  Temp: 97.9 °F (36.6 °C) (02/04/20 0752)  Pulse: 80 (02/04/20 0800)  Resp: 20 (02/04/20 0800)  BP: (!) 175/79 (02/04/20 0752)  SpO2: (!) 93 % (02/04/20 0800)  O2 Device (Oxygen Therapy): nasal cannula (02/04/20 0800) Vital Signs (24h Range):  Temp:  [96.2 °F (35.7 °C)-98.7 °F (37.1 °C)] 97.9 °F (36.6 °C)  Pulse:  [63-81] 80  Resp:  [13-20] 20  SpO2:  [91 %-99 %] 93 %  BP: (129-175)/(52-79) 175/79     Weight: 78.8 kg (173 lb 11.6 oz) (02/04/20 0500)  Body mass index is 22.92 kg/m².  Body surface area is 2.01 meters squared.    I/O last 3 completed shifts:  In: 960 [P.O.:960]  Out: 2425 [Urine:2425]    Physical Exam   Constitutional: He is oriented to person, place, and time. He appears well-developed and well-nourished. No distress.   HENT:   Head:  Normocephalic and atraumatic.   Mouth/Throat: No oropharyngeal exudate.   Eyes: Pupils are equal, round, and reactive to light. Conjunctivae and EOM are normal.   Neck: Normal range of motion. Neck supple. No JVD present. No thyromegaly present.   Cardiovascular: Normal rate, regular rhythm and normal heart sounds.   No murmur heard.  Pulmonary/Chest: Effort normal and breath sounds normal. No respiratory distress. He has no wheezes. He has no rales. He exhibits no tenderness.   Abdominal: Soft. Bowel sounds are normal. He exhibits no distension. There is no tenderness. There is no rebound and no guarding.   Musculoskeletal: Normal range of motion.   S/P RT BKA , Left foot transmetatarsal amputation    Lymphadenopathy:     He has no cervical adenopathy.   Neurological: He is alert and oriented to person, place, and time. He displays normal reflexes. No cranial nerve deficit or sensory deficit.   Skin: Skin is warm and dry. No rash noted. He is not diaphoretic.   Psychiatric: He has a normal mood and affect.       Significant Labs:  All labs within the past 24 hours have been reviewed.     Significant Imaging:

## 2020-02-04 NOTE — ASSESSMENT & PLAN NOTE
2/2  Overnight pt developed worsening SOB and hypoxia likely due to acute exacerbation of Chronic diastolic HF  IVF discontinued   Pt had received Lasix 60 mg IV x 1 dose   Monitor volume status   Monitor renal indices   Wean FiO2 as tolerated   CT chest revealed yary pleural effusion L>R, airspace disease involving yary lung bases , evidence of pulmonary fibrosis , subpleural bulla and centrilobular emphysema    Bronchodilator treatment    2/3  Improving   Continue gentle diuresis  Wean  FiO2   Continue bronchodilators   2/4  Continue supp O2 and wean as tolerated . Keep O2 sat 92% or higher   Need Home O2 eval

## 2020-02-04 NOTE — ASSESSMENT & PLAN NOTE
Acute kidney injury has improved and d/w dr. Magaña.  Will arrange follow-up as an outpatient along with cystatin C

## 2020-02-04 NOTE — PROGRESS NOTES
Dr. Tao notified of pt resp status. Breath sounds and o2 sats. Pt denies SOB. No new orders at this time.

## 2020-02-04 NOTE — PROGRESS NOTES
[facility] Hospital Medicine  Progress Note    Patient Name: Kodi Ribeiro  MRN: [id number]  Patient Class: IP- Inpatient   Admission Date: 1/31/2020  Length of Stay: 4 days  Attending Physician: Markell Magaña MD  Primary Care Provider: Norma Nuñez MD        Subjective:     Principal Problem:Acute renal failure superimposed on chronic kidney disease        HPI:   69 year old man with extensive past medical and surgery history of    stroke, spinal cord disease, peripheral vascular disease, MI (per patient, 2000 & 2012), lipoma of colon, infection of aortic graft, HTN, HLD, hyperglycemia, horseshoe kidney, hemorrhoids, GERD. He was recently discharged from the Hospital on  1/15/2020. He presented at this time with chest pain that started few hours prior to presentation.Pain was described a sharp ,non radiating ,not associated with diaphoresis and worsened with exertion. He called his wife and EMS was called.   Since admission, Lab data showed   Component      Latest Ref Rng & Units 1/31/2020 1/30/2020 1/15/2020               Hemoglobin      14.0 - 18.0 g/dL 6.9 (L) 7.0 (L) 7.7 (L)     Component      Latest Ref Rng & Units 1/31/2020 1/30/2020 1/15/2020               Creatinine      0.5 - 1.4 mg/dL 6.6 (H) 6.0 (H) 2.4 (H)   He also reported increased pain from the left foot ulceration and when the dressing was opened-sánchez pus was seen .  He will be admitted for symptomatic anemia and acute renal failure .    Overview/Hospital Course:  1/31-  Severe left sided hydronephrosis with ureteral stent visualized. In setting of acute renal failure concern with this being cause. This was discussed with Dr. Brennen Maldonado who feels that hydronephrosis is a chronic issue and not sole cause of acute renal failure. Patient already has stent and may need IR consult for nephrostomy tube placement. Will consult IR on case. Attempted to contact patient's urologist earlier today Dr. Jin, left voicemail. Will  dc asa and plavix due to pending procedure. Currently receiving 1 unit prbc. Will transfuse < 7.      Discussed case with Dr. Jin. Concerned with outlet obstruction. Recommended getting bladder scan which was negative. Recommendations were to workup other causes of renal failure as left sided hydronephrosis alone should not cause acute renal failure with the right kidney working.      Started sterile water with 150meq bicarb this am. Cont to monitor urine output. Fena calculated was 2.2% indicating intrinsic renal disease.      Case was discussed with IR earlier. Concerned with bleeding due to asa and plavix use. Does not recommend nephrostomy tube at this time due to risks. If any procedure needs to be done, recommended ureteral stent changed instead as it is less invasive.     2/1  Pt has no new C/O today . SOB is at baseline . On O2 at 2L/min. BNP is elevated noted .  Continued on gentle hydration with Bicarb . Serum creatinine is slightly better 6.6>5.3   Off of diuretics , ASA and Plavix  Possible ureteral stent change on Monday by  (Urologist)     S/P 1 unit of P-RBC yesterday  . Pt has H/O   chronic iron def anemia and is followed in the Hematology clinic on a regular basis . Baseline Hemoglobin ranges 8 to 9.   Bone scan suspicious for left foot osteomyelitis possibly chronic and does not appear an active issue at this time. Podiatry consult obtained and plan is noted   Will D/C broad spectrum antibiotic . Pt does have left foot ulceration and will place pt on Oral Doxycycline for 5 to  7 days.       2/2-  Last night event  noted. Pt was transferred to ICU overnight due to acute worsening of hypoxia required BiPAP therapy for oxygenation .  IVF discontinued . Pt had received Lasix 60 mg IV x 1 dose   Serum creatinine down to 4.2   Repeat U/S kidneys again showed Left sided hydronephrosis which is unchanged from prior study on admit     CT chest without contrast revealed yary pleural effusion  L>R, airspace disease involving yary lung bases , evidence of pulmonary fibrosis , subpleural bulla and centrilobular emphysema     2/3  SOB is better . Currently on O2 at 2L/min . Downgrade to Telemetry today   Urology consult obtained . CT abd /pelvis revealed interval worsening appearance of chronic severe left-sided hydronephrosis, and progression of severe atrophy of the left renal cortex. Left-sided double-J ureteral stent in satisfactory position. Per Urology left ureteral stent exchange tomorrow to lower risk of  pyelonephritis and to preserve remaining left kidney function.     Serum creatinine is trending down suggestive of renal function recovery . 42> 3.6   Urine output is satisfactory     Placed back on gentle diuresis due to cardiac decompensation   CT chest c/w emphysema. Continue bronchodilator treatment     2/4    Pt underwent Cystoscopy  with placement of left ureteral stent today without immediate complication   Post procedure pt developed SOB with increased O2 demand . Initially placed on biPAP and later wean to NC at 5 L/min  Additional lasix 20 mg IV x 1 dose given . Pt appears anxious and anxiolytics prescribed.     Serum creatinine improved since yesterday . 2.5 today . 6.6> 3.6>2.5  H/H are stable after 1 unit of P-RBC transfusion since admission       Interval History:   Pt underwent Cystoscopy  with placement of left ureteral stent today without immediate complication   Post procedure pt developed SOB with increased O2 demand . Initially placed on biPAP and later wean to NC at 5 L/min  Additional lasix 20 mg IV x 1 dose given . Pt appears anxious and anxiolytics prescribed.     Serum creatinine improved since yesterday . 2.5 today . 6.6> 3.6>2.5  H/H are stable after 1 unit of P-RBC transfusion since admission      Review of Systems   Constitutional: Positive for activity change and appetite change. Negative for fever.   HENT: Negative for sore throat.    Eyes: Negative for visual  disturbance.   Respiratory: Positive for shortness of breath. Negative for cough and chest tightness.    Cardiovascular: Negative for chest pain, palpitations and leg swelling.   Gastrointestinal: Negative for abdominal distention, abdominal pain, constipation, diarrhea, nausea and vomiting.   Endocrine: Negative for polyuria.   Genitourinary: Negative for decreased urine volume, dysuria, flank pain, frequency and hematuria.   Musculoskeletal: Negative for back pain and gait problem.   Skin: Negative for rash.   Neurological: Negative for syncope, speech difficulty, weakness, light-headedness and headaches.   Psychiatric/Behavioral: Negative for confusion, hallucinations and sleep disturbance.     Objective:     Vital Signs (Most Recent):  Temp: 97.3 °F (36.3 °C) (02/04/20 1105)  Pulse: 71 (02/04/20 1105)  Resp: 20 (02/04/20 1105)  BP: (!) 166/76 (02/04/20 1105)  SpO2: 95 % (02/04/20 1121) Vital Signs (24h Range):  Temp:  [96.2 °F (35.7 °C)-99.3 °F (37.4 °C)] 97.3 °F (36.3 °C)  Pulse:  [65-81] 71  Resp:  [13-23] 20  SpO2:  [90 %-100 %] 95 %  BP: (129-175)/(53-79) 166/76     Weight: 78.8 kg (173 lb 11.6 oz)  Body mass index is 22.92 kg/m².    Intake/Output Summary (Last 24 hours) at 2/4/2020 1252  Last data filed at 2/4/2020 0800  Gross per 24 hour   Intake 540 ml   Output 1275 ml   Net -735 ml      Physical Exam   Constitutional: He is oriented to person, place, and time. He appears well-developed and well-nourished. No distress.   HENT:   Head: Normocephalic and atraumatic.   Mouth/Throat: No oropharyngeal exudate.   Eyes: Pupils are equal, round, and reactive to light. Conjunctivae and EOM are normal.   Neck: Normal range of motion. Neck supple. No JVD present. No thyromegaly present.   Cardiovascular: Normal rate, regular rhythm and normal heart sounds.   No murmur heard.  Pulmonary/Chest: Effort normal. No respiratory distress. He has wheezes (faint , bilateral ). He has no rales. He exhibits no tenderness.    Abdominal: Soft. Bowel sounds are normal. He exhibits no distension. There is no tenderness. There is no rebound and no guarding.   Musculoskeletal: Normal range of motion. He exhibits no edema.   Lymphadenopathy:     He has no cervical adenopathy.   Neurological: He is alert and oriented to person, place, and time. He displays normal reflexes. No cranial nerve deficit or sensory deficit.   Skin: Skin is warm and dry. No rash noted. He is not diaphoretic.   Psychiatric: He has a normal mood and affect.       Significant Labs:   CBC:   Recent Labs   Lab 02/03/20  0332 02/04/20  0506   WBC 3.53* 4.05   HGB 8.0* 8.8*   HCT 26.2* 28.8*    222     CMP:   Recent Labs   Lab 02/03/20  0332 02/04/20  0506   * 137   K 4.1 4.3    104   CO2 21* 21*   GLU 85 90   BUN 45* 32*   CREATININE 3.6* 2.5*   CALCIUM 8.3* 8.8   ANIONGAP 10 12   EGFRNONAA 16* 25*       Significant Imaging:       Assessment/Plan:      * Acute renal failure superimposed on chronic kidney disease    Will do renal ultrasound,start gentle hydration, nephrology consult, check renal function in AM     2/1  Nephrology consult obtained   Continue gentle hydration   Serum creatinine is trending down slowly   Monitor volume status   H/O Left ureteral stricture and stent , now presenting with left hydronephrosis . Pt will have left ureteral stent change possibly on Monday 2/3/20 by    2/2  IVF discontinued due to acute exacerbation of diastolic CHF  Monitor volume status   Monitor Renal indices   2/3  Renal indices are better noted in AM labs   Placed back on gentle diuresis   Monitor renal indices   2/4  Renal indices continue to improve   S/P Cystoscopy and left ureteral stent exchange today   Continue gentle diuresis     Hydronephrosis of left kidney  -H/O Left ureteral stricture and ureteral stent . Will have left ureteral stent change possibly on Monday by    2/3  Left ureteral stent exchange tomorrow   2/4  Cystoscopy  and left ureteral stent exchange done today       Acute respiratory failure with hypoxia  2/2  Overnight pt developed worsening SOB and hypoxia likely due to acute exacerbation of Chronic diastolic HF  IVF discontinued   Pt had received Lasix 60 mg IV x 1 dose   Monitor volume status   Monitor renal indices   Wean FiO2 as tolerated   CT chest revealed yary pleural effusion L>R, airspace disease involving yary lung bases , evidence of pulmonary fibrosis , subpleural bulla and centrilobular emphysema    Bronchodilator treatment    2/3  Improving   Continue gentle diuresis  Wean  FiO2   Continue bronchodilators   2/4  Continue supp O2 and wean as tolerated . Keep O2 sat 92% or higher   Need Home O2 eval         Acute on chronic diastolic heart failure  2/2  Acute decompensation noted overnight   IVF discontinued   Pt had received Lasix 60 mg IV x 1 dose   Clinical improvement noted with diuresis   Monitor volume status   Monitor renal indices   2/3  Start gentle diuresis with oral Furosemide 20 mg daily   2/4  Additional Lasix 20 mg IV x 1 dose given today due to worsening hypoxia and increased O2 demand   Continue BB  ARB discontinued   Add Hydralazine with nitroglycerin.     Acute on chronic anemia    Will transfuse one unit of packed cell-monitor cbc in AM    2/1  Get iron study , B12, Folic  acid.   2/2, 2/3   Low B12, Folic acid and low iron noted .Repalce b12, folic acid and iron orally   2/4  H/H are stable post transfusion     CKD (chronic kidney disease) stage 3, GFR 30-59 ml/min  - Monitor renal indices       PVD (peripheral vascular disease)        Foot ulcer - Left Foot    Will do wound culture , podiatry consult -will need to rule out osteomyelitis , will do bone scan in AM   WILL start Rocephin/zyvox  2/1  D/C Rocephin and Zyvox   Start oral Doxycycline 100 mg po   2/2, 2/3, 2/4  As above     Essential hypertension    Will continue home medication of norvasc  2/4  ARB Losartan discontinued   Add Hydralazine  with Nitroglycerin     Ureteral stent retained  2/3  Stent exchange tomorrow         VTE Risk Mitigation (From admission, onward)         Ordered     enoxaparin injection 30 mg  Daily      02/04/20 1244     Reason for No Pharmacological VTE Prophylaxis  Once     Question:  Reasons:  Answer:  Physician Provided (leave comment)    01/31/20 0317     IP VTE HIGH RISK PATIENT  Once      01/31/20 0317                      Markell Magaña MD  Department of Hospital Medicine   [facility]

## 2020-02-04 NOTE — PROGRESS NOTES
"Patient anxious, sob upon arrival;Patient O2 sat 87-90% noted on NC 3L.   pt states he wish to place, NPV, "BIPAP on since he did not wear at night".   "

## 2020-02-04 NOTE — ASSESSMENT & PLAN NOTE
-H/O Left ureteral stricture and ureteral stent . Will have left ureteral stent change possibly on Monday by    2/3  Left ureteral stent exchange tomorrow   2/4  Cystoscopy and left ureteral stent exchange done today

## 2020-02-04 NOTE — ASSESSMENT & PLAN NOTE
Will transfuse one unit of packed cell-monitor cbc in AM    2/1  Get iron study , B12, Folic  acid.   2/2, 2/3   Low B12, Folic acid and low iron noted .Repalce b12, folic acid and iron orally   2/4  H/H are stable post transfusion

## 2020-02-04 NOTE — SUBJECTIVE & OBJECTIVE
Interval History:   Pt underwent Cystoscopy  with placement of left ureteral stent today without immediate complication   Post procedure pt developed SOB with increased O2 demand . Initially placed on biPAP and later wean to NC at 5 L/min  Additional lasix 20 mg IV x 1 dose given . Pt appears anxious and anxiolytics prescribed.     Serum creatinine improved since yesterday . 2.5 today . 6.6> 3.6>2.5  H/H are stable after 1 unit of P-RBC transfusion since admission      Review of Systems   Constitutional: Positive for activity change and appetite change. Negative for fever.   HENT: Negative for sore throat.    Eyes: Negative for visual disturbance.   Respiratory: Positive for shortness of breath. Negative for cough and chest tightness.    Cardiovascular: Negative for chest pain, palpitations and leg swelling.   Gastrointestinal: Negative for abdominal distention, abdominal pain, constipation, diarrhea, nausea and vomiting.   Endocrine: Negative for polyuria.   Genitourinary: Negative for decreased urine volume, dysuria, flank pain, frequency and hematuria.   Musculoskeletal: Negative for back pain and gait problem.   Skin: Negative for rash.   Neurological: Negative for syncope, speech difficulty, weakness, light-headedness and headaches.   Psychiatric/Behavioral: Negative for confusion, hallucinations and sleep disturbance.     Objective:     Vital Signs (Most Recent):  Temp: 97.3 °F (36.3 °C) (02/04/20 1105)  Pulse: 71 (02/04/20 1105)  Resp: 20 (02/04/20 1105)  BP: (!) 166/76 (02/04/20 1105)  SpO2: 95 % (02/04/20 1121) Vital Signs (24h Range):  Temp:  [96.2 °F (35.7 °C)-99.3 °F (37.4 °C)] 97.3 °F (36.3 °C)  Pulse:  [65-81] 71  Resp:  [13-23] 20  SpO2:  [90 %-100 %] 95 %  BP: (129-175)/(53-79) 166/76     Weight: 78.8 kg (173 lb 11.6 oz)  Body mass index is 22.92 kg/m².    Intake/Output Summary (Last 24 hours) at 2/4/2020 1252  Last data filed at 2/4/2020 0800  Gross per 24 hour   Intake 540 ml   Output 1275 ml   Net  -735 ml      Physical Exam   Constitutional: He is oriented to person, place, and time. He appears well-developed and well-nourished. No distress.   HENT:   Head: Normocephalic and atraumatic.   Mouth/Throat: No oropharyngeal exudate.   Eyes: Pupils are equal, round, and reactive to light. Conjunctivae and EOM are normal.   Neck: Normal range of motion. Neck supple. No JVD present. No thyromegaly present.   Cardiovascular: Normal rate, regular rhythm and normal heart sounds.   No murmur heard.  Pulmonary/Chest: Effort normal. No respiratory distress. He has wheezes (faint , bilateral ). He has no rales. He exhibits no tenderness.   Abdominal: Soft. Bowel sounds are normal. He exhibits no distension. There is no tenderness. There is no rebound and no guarding.   Musculoskeletal: Normal range of motion. He exhibits no edema.   Lymphadenopathy:     He has no cervical adenopathy.   Neurological: He is alert and oriented to person, place, and time. He displays normal reflexes. No cranial nerve deficit or sensory deficit.   Skin: Skin is warm and dry. No rash noted. He is not diaphoretic.   Psychiatric: He has a normal mood and affect.       Significant Labs:   CBC:   Recent Labs   Lab 02/03/20  0332 02/04/20  0506   WBC 3.53* 4.05   HGB 8.0* 8.8*   HCT 26.2* 28.8*    222     CMP:   Recent Labs   Lab 02/03/20  0332 02/04/20  0506   * 137   K 4.1 4.3    104   CO2 21* 21*   GLU 85 90   BUN 45* 32*   CREATININE 3.6* 2.5*   CALCIUM 8.3* 8.8   ANIONGAP 10 12   EGFRNONAA 16* 25*       Significant Imaging:

## 2020-02-04 NOTE — NURSING
Patient to be taken for Stent placement. Transportation here, Heart monitor removed and patient transferred to [facility]. Patient in NAD

## 2020-02-04 NOTE — ANESTHESIA RELEASE NOTE
"Anesthesia Release from PACU Note    Patient: Kodi Ribeiro    Procedure(s) Performed: Procedure(s) (LRB):  CYSTOSCOPY, WITH URETERAL STENT INSERTION (Left)  CYSTOSCOPY, WITH URETERAL STENT REMOVAL (Left)    Anesthesia type: general    Post pain: Adequate analgesia    Post assessment: no apparent anesthetic complications    Last Vitals:   Visit Vitals  BP (!) 175/79 (BP Location: Left arm, Patient Position: Lying)   Pulse 80   Temp 36.6 °C (97.9 °F) (Oral)   Resp 20   Ht 6' 1" (1.854 m)   Wt 78.8 kg (173 lb 11.6 oz)   SpO2 (!) 93%   BMI 22.92 kg/m²       Post vital signs: stable    Level of consciousness: awake    Nausea/Vomiting: no nausea/no vomiting    Complications: none    Airway Patency: patent    Respiratory: unassisted    Cardiovascular: stable and blood pressure at baseline    Hydration: euvolemic  "

## 2020-02-04 NOTE — PROGRESS NOTES
Chief Complaint: Left ureteral stricture with stent    HPI:   2/4/20: Ready for left stent change today  2/3/20: Was to have his stent changed but other acute issues precluded clearance since his last visit.  Seems to be better overall.  CT today shows severe right hydronephrosis; compred to CT 3 years ago that showed moderate right hydronephrosis.  Seems to void well, nocturia x1-2.  Reviewed history in detail. Stent likely patent given history.  6/3/19: Time to change left stent, will arrange.  Reviewed history in detail.  Needs US.  Had a heart cath and needs a CABG but can't be done due to prior reconstruction. Been treated for meningitis recently and still has neck pain.  5/17/18: Time to change left stent, will arrange.  No acute changes.  4/24/17: Due for left stent change, will arrange.  4/22/16: No changes in last year feeling very well.  Here to replace left stent.  Says he is doing great.  4/22/15: Doing fine with stent change. Stent had no problems after a year; RTC 3/1/16 to plan for next change.   3/19/15: Pt had his stent replaced 1/2014 but has not been back since.  He had major surgery for bowel abscess in the months after he saw me with a lot of vascular reconstruction.  Here to discuss replacement of his left ureteral stent.  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  1/17/14: Pt returns for f/u.  Recent CT shows stent still in place with no urolithiasis.  He has had a fem-fem bypass and his legs are working much better; he felt so good.    10/2012: Pt was found to have left ureteral stricture secondary to AAA repair. A stent was placed in May and then replaced by me last week. He has a long stricture of the middle ureter, such that a reimplant would require a psoas hitch/boari flap reaching to the upper ureter. This is complicated by the patient having a horseshoe kidney so nephropexy with proximal mobilization would be impractical. He is tolerating the stent well and is practically unaware  of its presence.  >>> he was to have Mag3 and  f/u from this appt in 5 mo for stent replacement but did not return, Mag3 showed improved drainage with stent..... <<<  9/2012: About 3 months ago pt had severe left kidney infection and had an MI during the same period. He was diagnosed as having a left ureteral obstruction at the level of the iliac crest. A left ureteral stent was placed. He had a Mag3 study prior to stent placement that showed normal uptake bilaterally, normal right and delayed left excretion (44 min). Recent MI caused some damage but no further stenting or CABG indicated now. No history of kidney stones. Had a full body scan in 2008 that showed left hydroureter and had a stent placed at that time and it was removed 8 months later with no plan for followup. Current stent has been in place for two months. No LUTS. Pain 0/10. Had an episode of gross hematuria one time last week.    Allergies:  Morphine    Medications:  has a current medication list which includes the following prescription(s): amlodipine, aspirin, carvedilol, clopidogrel, docusate sodium, isosorbide mononitrate, losartan, nitroglycerin, omeprazole, oxycodone-acetaminophen, pantoprazole, pravastatin, sertraline, and triamcinolone acetonide 0.1%.    Review of Systems:  General: No fever, chills, fatigability, or weight loss.  Skin: No rashes, itching, or changes in color or texture of skin.  Chest: Denies MACKEY, cyanosis, wheezing, cough, and sputum production.  Abdomen: Appetite fine. No weight loss. Denies diarrhea, abdominal pain, hematemesis, or blood in stool.  Musculoskeletal: No joint stiffness or swelling. Denies back pain.  : As above.  All other review of systems negative.    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    PSH:   has a past surgical history that includes Aorta - bilateral femoral artery bypass graft (2014); Coronary angioplasty with stent (2000); Abdominal aortic aneurysm repair; Ureteral stent placement (Left); right below knee amputation (2009 (approx)); Foot amputation through metatarsal (1996); Lung lobectomy (Right, 1970s); Abdominal aortic aneurysm repair (1996/2014); Kidney surgery (2014); Foot surgery (Bilateral, 1980's); Amputation; Tonsillectomy (1955 aprox); Small intestine surgery (2014); Cystoscopy w/ ureteral stent placement (Left, 5/29/2018); Cystoscopy w/ retrogrades (Left, 5/29/2018); Cystoscopy w/ ureteral stent removal (Left, 5/29/2018); and Left heart catheterization (Left, 3/7/2019).    FamHx: family history includes COPD in his mother; Cancer in his mother; Diabetes in his daughter; Heart disease in his father.    SocHx:  reports that he quit smoking about 11 years ago. He has a 15.00 pack-year smoking history. He has never used smokeless tobacco. He reports that he does not drink alcohol or use drugs.      Physical Exam:  Vitals:    02/04/20 0800   BP:    Pulse: 80   Resp: 20   Temp:      General: A&Ox3, no apparent distress, no deformities  Neck: No masses, normal thyroid  Abdomen: Soft, NT, ND  Skin: The skin is warm and dry. No jaundice.  Ext: No c/c/e.  : deferred to cysto    Labs/Studies:   PSA    11/13: 0.5    12/14: 0.4  Urinalysis performed in clinic, summary: UA normal    Impression/Plan:   1. To OR for left  ureteral stent change  2. Hydronephrosis could be physiologic due to chronic backpressure from the bladder.   3. Med changes have sig improved renal function with the old stent in place  4. Possible that left renal function is so poor that stent is not a factor; will change to lower risk of pyelonephritis and to promote what function from it is possible.

## 2020-02-04 NOTE — ASSESSMENT & PLAN NOTE
Will do renal ultrasound,start gentle hydration, nephrology consult, check renal function in AM     2/1  Nephrology consult obtained   Continue gentle hydration   Serum creatinine is trending down slowly   Monitor volume status   H/O Left ureteral stricture and stent , now presenting with left hydronephrosis . Pt will have left ureteral stent change possibly on Monday 2/3/20 by    2/2  IVF discontinued due to acute exacerbation of diastolic CHF  Monitor volume status   Monitor Renal indices   2/3  Renal indices are better noted in AM labs   Placed back on gentle diuresis   Monitor renal indices   2/4  Renal indices continue to improve   S/P Cystoscopy and left ureteral stent exchange today   Continue gentle diuresis

## 2020-02-04 NOTE — TRANSFER OF CARE
"Anesthesia Transfer of Care Note    Patient: Kodi Ribeiro    Procedure(s) Performed: Procedure(s) (LRB):  CYSTOSCOPY, WITH URETERAL STENT INSERTION (Left)  CYSTOSCOPY, WITH URETERAL STENT REMOVAL (Left)    Patient location: PACU    Anesthesia Type: general    Transport from OR: Transported from OR on room air with adequate spontaneous ventilation    Post pain: adequate analgesia    Post assessment: no apparent anesthetic complications    Post vital signs: stable    Level of consciousness: awake    Nausea/Vomiting: no nausea/vomiting    Complications: none    Transfer of care protocol was followed      Last vitals:   Visit Vitals  BP (!) 175/79 (BP Location: Left arm, Patient Position: Lying)   Pulse 80   Temp 36.6 °C (97.9 °F) (Oral)   Resp 20   Ht 6' 1" (1.854 m)   Wt 78.8 kg (173 lb 11.6 oz)   SpO2 (!) 93%   BMI 22.92 kg/m²     "

## 2020-02-04 NOTE — PLAN OF CARE
Pt without fall.  VSS.  Wound dressed.  Encourage po fluids.\  POC reviewed with patient.  Will continue monitoring.

## 2020-02-04 NOTE — ASSESSMENT & PLAN NOTE
Will do wound culture , podiatry consult -will need to rule out osteomyelitis , will do bone scan in AM   WILL start Rocephin/zyvox  2/1  D/C Rocephin and Zyvox   Start oral Doxycycline 100 mg po   2/2, 2/3, 2/4  As above

## 2020-02-04 NOTE — OP NOTE
Date of Procedure: 02/04/2020    Pre-operative Diagnosis:   1.  Left ureteral obstruction    Post-operative Diagnosis:   1.  same    Surgeon: GISELE Jin MD    Assistants: None    Specimen: None    Prosthetics, Devices, Grafts: None    Anesthesia: General with LMA    Procedures:  1. Cysto with placement of left ureteral stent  2. Fluoro less than one hour    Procedure in Detail:  After proper consents were obtained, the patient was prepped and draped in normal sterile fashion in the dorsal lithotomy position.  The 22 Fr resectascope was assembled and introduced per urethra.  The bladder was inspected.  Grasper brought the previous stent to the meatus.  A guide wire was passed to the left renal pelvis coaxially and a new stent was then passed over the wire and when the wire was withdrawn fluoroscopy confirmed good placement with a curl in the stent on both ends.  The cystoscope was then reinserted to the bladder which was drained and the scope was then withdrawn and set aside.    Note: the stent was patent and had a normal bore lumen without obstruction    Blood Loss: none    Fluids: Per anesthesia.    Drains: 7x26cm left ureteral stent    Complications: None.

## 2020-02-04 NOTE — ASSESSMENT & PLAN NOTE
2/2  Acute decompensation noted overnight   IVF discontinued   Pt had received Lasix 60 mg IV x 1 dose   Clinical improvement noted with diuresis   Monitor volume status   Monitor renal indices   2/3  Start gentle diuresis with oral Furosemide 20 mg daily   2/4  Additional Lasix 20 mg IV x 1 dose given today due to worsening hypoxia and increased O2 demand   Continue BB  ARB discontinued   Add Hydralazine with nitroglycerin.

## 2020-02-04 NOTE — ANESTHESIA PREPROCEDURE EVALUATION
02/04/2020  Kodi Ribeiro is a 70 y.o., male.    Anesthesia Evaluation    I have reviewed the Patient Summary Reports.    I have reviewed the Nursing Notes.   I have reviewed the Medications.     Review of Systems  Anesthesia Hx:  No problems with previous Anesthesia Denies Hx of Anesthetic complications  Denies Family Hx of Anesthesia complications.   Denies Personal Hx of Anesthesia complications.   Social:  Non-Smoker, No Alcohol Use    Cardiovascular:   Hypertension Past MI CAD   Angina CHF ECG has been reviewed.    Pulmonary:  Pulmonary Normal    Renal/:   Chronic Renal Disease, CRI    Hepatic/GI:   GERD    Neurological:   CVA    Endocrine:  Endocrine Normal    Psych:  Psychiatric Normal         Patient Active Problem List   Diagnosis    Ureteral stent retained    Hiatal hernia with GERD    Essential hypertension    Foot ulcer - Left Foot    Idiopathic peripheral neuropathy    Anemia    PVD (peripheral vascular disease)    CKD (chronic kidney disease) stage 3, GFR 30-59 ml/min    Horseshoe kidney    CAD;Old MI ; s/p stents x 3 (2000)     Aortic stenosis    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    Ureteral stricture, left    Left carotid artery stenosis    Ureteral stricture    History of transmetatarsal amputation of left foot    Iron deficiency anemia due to chronic blood loss    Persistent proteinuria    CKD stage G4/A3, GFR 15-29 and albumin creatinine ratio >300 mg/g    Elevated serum immunoglobulin free light chains    Unstable angina    Acute renal failure superimposed on chronic kidney disease    Hypomagnesemia    Renal dysfunction    Acute on chronic diastolic heart failure    Acute on chronic anemia    Hydronephrosis of left kidney    Coronary artery disease involving native coronary artery of native heart without angina pectoris    Acute  respiratory failure with hypoxia     Past Surgical History:   Procedure Laterality Date    ABDOMINAL AORTIC ANEURYSM REPAIR      ABDOMINAL AORTIC ANEURYSM REPAIR  1996/2014    AMPUTATION, LOWER LIMB      AORTA - BILATERAL FEMORAL ARTERY BYPASS GRAFT  2014    Left and right leg    CORONARY ANGIOPLASTY WITH STENT PLACEMENT  2000    Three placed in heart    CYSTOSCOPY W/ RETROGRADES Left 5/29/2018    Procedure: CYSTOSCOPY, WITH RETROGRADE PYELOGRAM;  Surgeon: Scooter Jin IV, MD;  Location: [facility] OR;  Service: Urology;  Laterality: Left;    CYSTOSCOPY W/ URETERAL STENT PLACEMENT Left 5/29/2018    Procedure: CYSTOSCOPY, WITH URETERAL STENT INSERTION;  Surgeon: Scooter Jin IV, MD;  Location: [facility] OR;  Service: Urology;  Laterality: Left;    CYSTOSCOPY W/ URETERAL STENT REMOVAL Left 5/29/2018    Procedure: CYSTOSCOPY, WITH URETERAL STENT REMOVAL;  Surgeon: Scooter Jin IV, MD;  Location: [facility] OR;  Service: Urology;  Laterality: Left;    FOOT AMPUTATION THROUGH METATARSAL  1996    left    FOOT SURGERY Bilateral 1980's    per patient multiple toe amputations prior to.  partial foot amputation:first great toe then other toes     KIDNEY SURGERY  2014    per patient separation of horseshoe kidney @ time of AAA repair    LEFT HEART CATHETERIZATION Left 3/7/2019    Procedure: CATHETERIZATION, HEART, LEFT;  Surgeon: Adriel Boone MD;  Location: [facility] CATH LAB;  Service: Cardiology;  Laterality: Left;  630 admit for IV hydration  10am start    LUNG LOBECTOMY Right 1970s    per patient not cancer    right below knee amputation  2009 (approx)    SMALL INTESTINE SURGERY  2014    per patient partial @ time of aaa repair  not small bowel - large bowel bowel compromised bythtwe AAAbowel    TONSILLECTOMY  1955 aprox    URETERAL STENT PLACEMENT Left     annually replaced since 2012 or so  Dr Jin           Physical Exam  General:  Well nourished    Airway/Jaw/Neck:  Airway Findings: Mouth Opening:  Normal Tongue: Normal  General Airway Assessment: Adult  Mallampati: II  TM Distance: 4 - 6 cm  Jaw/Neck Findings:  Neck ROM: Normal ROM      Dental:  Dental Findings: In tact   Chest/Lungs:  Chest/Lungs Findings: Clear to auscultation, Normal Respiratory Rate     Heart/Vascular:  Heart Findings: Rate: Normal  Rhythm: Regular Rhythm  Sounds: Normal        Mental Status:  Mental Status Findings:  Cooperative, Alert and Oriented       Lab Results   Component Value Date    WBC 4.05 02/04/2020    HGB 8.8 (L) 02/04/2020    HCT 28.8 (L) 02/04/2020    MCV 88 02/04/2020     02/04/2020         Chemistry        Component Value Date/Time     02/04/2020 0506    K 4.3 02/04/2020 0506     02/04/2020 0506    CO2 21 (L) 02/04/2020 0506    BUN 32 (H) 02/04/2020 0506    CREATININE 2.5 (H) 02/04/2020 0506    GLU 90 02/04/2020 0506        Component Value Date/Time    CALCIUM 8.8 02/04/2020 0506    ALKPHOS 131 01/31/2020 0034    AST 6 (L) 01/31/2020 0034    ALT <5 (L) 01/31/2020 0034    BILITOT 0.3 01/31/2020 0034    ESTGFRAFRICA 29 (A) 02/04/2020 0506    EGFRNONAA 25 (A) 02/04/2020 0506            Anesthesia Plan  Type of Anesthesia, risks & benefits discussed:  Anesthesia Type:  MAC  Patient's Preference:   Intra-op Monitoring Plan: standard ASA monitors  Intra-op Monitoring Plan Comments:   Post Op Pain Control Plan: per primary service following discharge from PACU  Post Op Pain Control Plan Comments:   Induction:   IV  Beta Blocker:  Patient is not currently on a Beta-Blocker (No further documentation required).       Informed Consent: Patient understands risks and agrees with Anesthesia plan.  Questions answered. Anesthesia consent signed with patient.  ASA Score: 4     Day of Surgery Review of History & Physical: I have interviewed and examined the patient. I have reviewed the patient's H&P dated:  There are no significant changes.  H&P update referred to the surgeon.         Ready For Surgery From Anesthesia  Perspective.

## 2020-02-04 NOTE — PROGRESS NOTES
[facility] -   Nephrology  Progress Note    Patient Name: Kodi Ribeiro  MRN: [id number]  Admission Date: 1/31/2020  Hospital Length of Stay: 4 days  Attending Provider: aMrkell Magaña MD   Primary Care Physician: Norma Nuñez MD  Principal Problem:Acute renal failure superimposed on chronic kidney disease    Subjective:     HPI: Patient is a 70-year-old male with history of hypertension and chronic kidney disease stage 3/stage IV.  Patient's baseline creatinine has fluctuated in the past with multiple episodes of acute kidney injury last time was seen in the hospital was in April of 2019 with a creatinine went up to 4.8 and at times higher.  Since then patient has been having fluctuations in creatinine up to 2.0.  Patient was recently discharged from the hospital.  Patient comes back with shortness of breath.  Was found to have acute kidney injury on chronic kidney disease with a creatinine up to 6.0.  Patient also found to have a hemoglobin drop down to 7.0 and is receiving 1 unit of blood transfusion with gentle hydration.  Patient also has shortness of breath and congestive heart failure.    Interval History: MARCELA better ; getting ureteric stent changed today     Review of patient's allergies indicates:   Allergen Reactions    Morphine Itching     Current Facility-Administered Medications   Medication Frequency    acetaminophen tablet 500 mg Q6H PRN    albuterol-ipratropium 2.5 mg-0.5 mg/3 mL nebulizer solution 3 mL Q4H PRN    albuterol-ipratropium 2.5 mg-0.5 mg/3 mL nebulizer solution 3 mL Q6H    amLODIPine tablet 10 mg Daily    carvedilol tablet 25 mg BID WM    cefazolin (ANCEF) 2 gram in dextrose 5% 50 mL IVPB (premix) Once    dextrose 10% (D10W) Bolus PRN    dextrose 10% (D10W) Bolus PRN    doxycycline tablet 100 mg Q12H    ferrous gluconate tablet 324 mg BID WM    folic acid-vit B6-vit B12 2.5-25-2 mg tablet 1 tablet Daily    furosemide tablet 20 mg Daily    glucagon (human  recombinant) injection 1 mg PRN    glucose chewable tablet 16 g PRN    glucose chewable tablet 24 g PRN    guaifenesin 100 mg/5 ml syrup 200 mg Q4H PRN    hydrALAZINE injection 10 mg Q8H PRN    nitroGLYCERIN SL tablet 0.4 mg Q5 Min PRN    pantoprazole EC tablet 40 mg Daily    sodium chloride 0.9% flush 10 mL PRN       Objective:     Vital Signs (Most Recent):  Temp: 97.9 °F (36.6 °C) (02/04/20 0752)  Pulse: 80 (02/04/20 0800)  Resp: 20 (02/04/20 0800)  BP: (!) 175/79 (02/04/20 0752)  SpO2: (!) 93 % (02/04/20 0800)  O2 Device (Oxygen Therapy): nasal cannula (02/04/20 0800) Vital Signs (24h Range):  Temp:  [96.2 °F (35.7 °C)-98.7 °F (37.1 °C)] 97.9 °F (36.6 °C)  Pulse:  [63-81] 80  Resp:  [13-20] 20  SpO2:  [91 %-99 %] 93 %  BP: (129-175)/(52-79) 175/79     Weight: 78.8 kg (173 lb 11.6 oz) (02/04/20 0500)  Body mass index is 22.92 kg/m².  Body surface area is 2.01 meters squared.    I/O last 3 completed shifts:  In: 960 [P.O.:960]  Out: 2425 [Urine:2425]    Physical Exam   Constitutional: He is oriented to person, place, and time. He appears well-developed and well-nourished. No distress.   HENT:   Head: Normocephalic and atraumatic.   Mouth/Throat: No oropharyngeal exudate.   Eyes: Pupils are equal, round, and reactive to light. Conjunctivae and EOM are normal.   Neck: Normal range of motion. Neck supple. No JVD present. No thyromegaly present.   Cardiovascular: Normal rate, regular rhythm and normal heart sounds.   No murmur heard.  Pulmonary/Chest: Effort normal and breath sounds normal. No respiratory distress. He has no wheezes. He has no rales. He exhibits no tenderness.   Abdominal: Soft. Bowel sounds are normal. He exhibits no distension. There is no tenderness. There is no rebound and no guarding.   Musculoskeletal: Normal range of motion.   S/P RT BKA , Left foot transmetatarsal amputation    Lymphadenopathy:     He has no cervical adenopathy.   Neurological: He is alert and oriented to person, place,  and time. He displays normal reflexes. No cranial nerve deficit or sensory deficit.   Skin: Skin is warm and dry. No rash noted. He is not diaphoretic.   Psychiatric: He has a normal mood and affect.       Significant Labs:  All labs within the past 24 hours have been reviewed.     Significant Imaging:      Assessment/Plan:     * Acute renal failure superimposed on chronic kidney disease      Acute kidney injury has improved and d/w dr. Magaña.  Will arrange follow-up as an outpatient along with cystatin C        Thank you for your consult.     Brenton Jacobson MD  Nephrology  [facility]

## 2020-02-04 NOTE — PLAN OF CARE
Pt in bed AAOx4. Plan of Care reviewed, Verbalized understanding.   Patient remained free from falls, fall precautions in place.  Patient is NSR to Sinus bradycardia on monitor.VSS.  Pain controlled throughout shift with PRN Tylenol.   Coughing controlled with PRN Robitussin. Midnight snack given upon patient request.   Call bell and personal belongings within reach. Hourly rounding complete. Reminded to call for assistance. No complaints at this time. Will continue to monitor.

## 2020-02-05 PROBLEM — R07.89 CHEST PAIN, MIDSTERNAL: Status: ACTIVE | Noted: 2020-02-05

## 2020-02-05 LAB
ANION GAP SERPL CALC-SCNC: 9 MMOL/L (ref 8–16)
BASOPHILS # BLD AUTO: 0.02 K/UL (ref 0–0.2)
BASOPHILS NFR BLD: 0.5 % (ref 0–1.9)
BNP SERPL-MCNC: 1554 PG/ML (ref 0–99)
BUN SERPL-MCNC: 24 MG/DL (ref 8–23)
CALCIUM SERPL-MCNC: 8.2 MG/DL (ref 8.7–10.5)
CHLORIDE SERPL-SCNC: 104 MMOL/L (ref 95–110)
CO2 SERPL-SCNC: 22 MMOL/L (ref 23–29)
CREAT SERPL-MCNC: 2.2 MG/DL (ref 0.5–1.4)
DIFFERENTIAL METHOD: ABNORMAL
EOSINOPHIL # BLD AUTO: 0.1 K/UL (ref 0–0.5)
EOSINOPHIL NFR BLD: 3.4 % (ref 0–8)
ERYTHROCYTE [DISTWIDTH] IN BLOOD BY AUTOMATED COUNT: 15.5 % (ref 11.5–14.5)
EST. GFR  (AFRICAN AMERICAN): 34 ML/MIN/1.73 M^2
EST. GFR  (NON AFRICAN AMERICAN): 29 ML/MIN/1.73 M^2
GLUCOSE SERPL-MCNC: 96 MG/DL (ref 70–110)
HCT VFR BLD AUTO: 26.6 % (ref 40–54)
HGB BLD-MCNC: 8 G/DL (ref 14–18)
IMM GRANULOCYTES # BLD AUTO: 0.02 K/UL (ref 0–0.04)
IMM GRANULOCYTES NFR BLD AUTO: 0.5 % (ref 0–0.5)
LYMPHOCYTES # BLD AUTO: 0.4 K/UL (ref 1–4.8)
LYMPHOCYTES NFR BLD: 11.3 % (ref 18–48)
MAGNESIUM SERPL-MCNC: 1.4 MG/DL (ref 1.6–2.6)
MCH RBC QN AUTO: 26.9 PG (ref 27–31)
MCHC RBC AUTO-ENTMCNC: 30.1 G/DL (ref 32–36)
MCV RBC AUTO: 90 FL (ref 82–98)
MONOCYTES # BLD AUTO: 0.3 K/UL (ref 0.3–1)
MONOCYTES NFR BLD: 7.4 % (ref 4–15)
NEUTROPHILS # BLD AUTO: 2.9 K/UL (ref 1.8–7.7)
NEUTROPHILS NFR BLD: 76.9 % (ref 38–73)
NRBC BLD-RTO: 0 /100 WBC
PLATELET # BLD AUTO: 163 K/UL (ref 150–350)
PMV BLD AUTO: 9.9 FL (ref 9.2–12.9)
POTASSIUM SERPL-SCNC: 4.5 MMOL/L (ref 3.5–5.1)
RBC # BLD AUTO: 2.97 M/UL (ref 4.6–6.2)
SODIUM SERPL-SCNC: 135 MMOL/L (ref 136–145)
TROPONIN I SERPL DL<=0.01 NG/ML-MCNC: 0.01 NG/ML (ref 0–0.03)
TROPONIN I SERPL DL<=0.01 NG/ML-MCNC: 0.01 NG/ML (ref 0–0.03)
TROPONIN I SERPL DL<=0.01 NG/ML-MCNC: 0.02 NG/ML (ref 0–0.03)
TROPONIN I SERPL DL<=0.01 NG/ML-MCNC: 0.03 NG/ML (ref 0–0.03)
WBC # BLD AUTO: 3.8 K/UL (ref 3.9–12.7)

## 2020-02-05 PROCEDURE — 93010 EKG 12-LEAD: ICD-10-PCS | Mod: HCNC,,, | Performed by: INTERNAL MEDICINE

## 2020-02-05 PROCEDURE — 63600175 PHARM REV CODE 636 W HCPCS: Mod: HCNC | Performed by: INTERNAL MEDICINE

## 2020-02-05 PROCEDURE — 93005 ELECTROCARDIOGRAM TRACING: CPT | Mod: HCNC

## 2020-02-05 PROCEDURE — 87040 BLOOD CULTURE FOR BACTERIA: CPT | Mod: HCNC

## 2020-02-05 PROCEDURE — 94640 AIRWAY INHALATION TREATMENT: CPT | Mod: HCNC

## 2020-02-05 PROCEDURE — 25000003 PHARM REV CODE 250: Mod: HCNC | Performed by: INTERNAL MEDICINE

## 2020-02-05 PROCEDURE — 63600175 PHARM REV CODE 636 W HCPCS: Mod: HCNC | Performed by: UROLOGY

## 2020-02-05 PROCEDURE — 25000003 PHARM REV CODE 250: Mod: HCNC | Performed by: UROLOGY

## 2020-02-05 PROCEDURE — 21400001 HC TELEMETRY ROOM: Mod: HCNC

## 2020-02-05 PROCEDURE — 36415 COLL VENOUS BLD VENIPUNCTURE: CPT | Mod: HCNC

## 2020-02-05 PROCEDURE — 27000221 HC OXYGEN, UP TO 24 HOURS: Mod: HCNC

## 2020-02-05 PROCEDURE — 84484 ASSAY OF TROPONIN QUANT: CPT | Mod: 91,HCNC

## 2020-02-05 PROCEDURE — 83880 ASSAY OF NATRIURETIC PEPTIDE: CPT | Mod: HCNC

## 2020-02-05 PROCEDURE — 25000242 PHARM REV CODE 250 ALT 637 W/ HCPCS: Mod: HCNC | Performed by: UROLOGY

## 2020-02-05 PROCEDURE — 93010 ELECTROCARDIOGRAM REPORT: CPT | Mod: HCNC,,, | Performed by: INTERNAL MEDICINE

## 2020-02-05 PROCEDURE — 81003 URINALYSIS AUTO W/O SCOPE: CPT | Mod: HCNC

## 2020-02-05 PROCEDURE — 85025 COMPLETE CBC W/AUTO DIFF WBC: CPT | Mod: HCNC

## 2020-02-05 PROCEDURE — 83735 ASSAY OF MAGNESIUM: CPT | Mod: HCNC

## 2020-02-05 PROCEDURE — 25000003 PHARM REV CODE 250: Mod: HCNC | Performed by: NURSE PRACTITIONER

## 2020-02-05 PROCEDURE — 80048 BASIC METABOLIC PNL TOTAL CA: CPT | Mod: HCNC

## 2020-02-05 PROCEDURE — 99900035 HC TECH TIME PER 15 MIN (STAT): Mod: HCNC

## 2020-02-05 PROCEDURE — 87086 URINE CULTURE/COLONY COUNT: CPT | Mod: HCNC

## 2020-02-05 RX ORDER — ALPRAZOLAM 0.5 MG/1
0.5 TABLET ORAL ONCE
Status: COMPLETED | OUTPATIENT
Start: 2020-02-05 | End: 2020-02-05

## 2020-02-05 RX ORDER — LEVOFLOXACIN 750 MG/1
750 TABLET ORAL
Status: DISCONTINUED | OUTPATIENT
Start: 2020-02-05 | End: 2020-02-06 | Stop reason: HOSPADM

## 2020-02-05 RX ORDER — CLOPIDOGREL BISULFATE 75 MG/1
75 TABLET ORAL DAILY
Status: DISCONTINUED | OUTPATIENT
Start: 2020-02-05 | End: 2020-02-06 | Stop reason: HOSPADM

## 2020-02-05 RX ORDER — ASPIRIN 81 MG/1
81 TABLET ORAL DAILY
Status: DISCONTINUED | OUTPATIENT
Start: 2020-02-05 | End: 2020-02-05

## 2020-02-05 RX ORDER — ASPIRIN 81 MG/1
81 TABLET ORAL DAILY
Status: DISCONTINUED | OUTPATIENT
Start: 2020-02-05 | End: 2020-02-06 | Stop reason: HOSPADM

## 2020-02-05 RX ORDER — IPRATROPIUM BROMIDE AND ALBUTEROL SULFATE 2.5; .5 MG/3ML; MG/3ML
3 SOLUTION RESPIRATORY (INHALATION) EVERY 8 HOURS
Status: DISCONTINUED | OUTPATIENT
Start: 2020-02-05 | End: 2020-02-06 | Stop reason: HOSPADM

## 2020-02-05 RX ORDER — ENOXAPARIN SODIUM 100 MG/ML
40 INJECTION SUBCUTANEOUS EVERY 24 HOURS
Status: DISCONTINUED | OUTPATIENT
Start: 2020-02-05 | End: 2020-02-06 | Stop reason: HOSPADM

## 2020-02-05 RX ORDER — FUROSEMIDE 10 MG/ML
20 INJECTION INTRAMUSCULAR; INTRAVENOUS ONCE
Status: DISCONTINUED | OUTPATIENT
Start: 2020-02-05 | End: 2020-02-05

## 2020-02-05 RX ORDER — FUROSEMIDE 10 MG/ML
20 INJECTION INTRAMUSCULAR; INTRAVENOUS ONCE
Status: COMPLETED | OUTPATIENT
Start: 2020-02-05 | End: 2020-02-05

## 2020-02-05 RX ORDER — IPRATROPIUM BROMIDE AND ALBUTEROL SULFATE 2.5; .5 MG/3ML; MG/3ML
3 SOLUTION RESPIRATORY (INHALATION) EVERY 8 HOURS
Status: DISCONTINUED | OUTPATIENT
Start: 2020-02-06 | End: 2020-02-05

## 2020-02-05 RX ADMIN — Medication 1 TABLET: at 09:02

## 2020-02-05 RX ADMIN — IPRATROPIUM BROMIDE AND ALBUTEROL SULFATE 3 ML: .5; 3 SOLUTION RESPIRATORY (INHALATION) at 07:02

## 2020-02-05 RX ADMIN — FERROUS GLUCONATE TAB 324 MG (37.5 MG ELEMENTAL IRON) 324 MG: 324 (37.5 FE) TAB at 09:02

## 2020-02-05 RX ADMIN — PANTOPRAZOLE SODIUM 40 MG: 40 TABLET, DELAYED RELEASE ORAL at 09:02

## 2020-02-05 RX ADMIN — DOXYCYCLINE HYCLATE 100 MG: 100 TABLET, COATED ORAL at 09:02

## 2020-02-05 RX ADMIN — CARVEDILOL 25 MG: 12.5 TABLET, FILM COATED ORAL at 09:02

## 2020-02-05 RX ADMIN — ALPRAZOLAM 0.5 MG: 0.5 TABLET ORAL at 09:02

## 2020-02-05 RX ADMIN — LEVOFLOXACIN 750 MG: 750 TABLET, FILM COATED ORAL at 05:02

## 2020-02-05 RX ADMIN — HYDRALAZINE HYDROCHLORIDE 10 MG: 10 TABLET, FILM COATED ORAL at 08:02

## 2020-02-05 RX ADMIN — CLOPIDOGREL BISULFATE 75 MG: 75 TABLET ORAL at 11:02

## 2020-02-05 RX ADMIN — ISOSORBIDE MONONITRATE 120 MG: 60 TABLET, EXTENDED RELEASE ORAL at 09:02

## 2020-02-05 RX ADMIN — GUAIFENESIN 200 MG: 200 SOLUTION ORAL at 03:02

## 2020-02-05 RX ADMIN — GUAIFENESIN 200 MG: 200 SOLUTION ORAL at 02:02

## 2020-02-05 RX ADMIN — IPRATROPIUM BROMIDE AND ALBUTEROL SULFATE 3 ML: .5; 3 SOLUTION RESPIRATORY (INHALATION) at 01:02

## 2020-02-05 RX ADMIN — ASPIRIN 81 MG: 81 TABLET, COATED ORAL at 11:02

## 2020-02-05 RX ADMIN — ALPRAZOLAM 0.5 MG: 0.5 TABLET ORAL at 03:02

## 2020-02-05 RX ADMIN — CARVEDILOL 25 MG: 12.5 TABLET, FILM COATED ORAL at 05:02

## 2020-02-05 RX ADMIN — ENOXAPARIN SODIUM 40 MG: 100 INJECTION SUBCUTANEOUS at 05:02

## 2020-02-05 RX ADMIN — GUAIFENESIN 200 MG: 200 SOLUTION ORAL at 09:02

## 2020-02-05 RX ADMIN — NITROGLYCERIN 0.4 MG: 0.4 TABLET, ORALLY DISINTEGRATING SUBLINGUAL at 05:02

## 2020-02-05 RX ADMIN — HYDRALAZINE HYDROCHLORIDE 10 MG: 10 TABLET, FILM COATED ORAL at 09:02

## 2020-02-05 RX ADMIN — AMLODIPINE BESYLATE 10 MG: 10 TABLET ORAL at 09:02

## 2020-02-05 RX ADMIN — FERROUS GLUCONATE TAB 324 MG (37.5 MG ELEMENTAL IRON) 324 MG: 324 (37.5 FE) TAB at 05:02

## 2020-02-05 RX ADMIN — FUROSEMIDE 20 MG: 10 INJECTION, SOLUTION INTRAMUSCULAR; INTRAVENOUS at 11:02

## 2020-02-05 RX ADMIN — NITROGLYCERIN 0.4 MG: 0.4 TABLET, ORALLY DISINTEGRATING SUBLINGUAL at 01:02

## 2020-02-05 RX ADMIN — ACETAMINOPHEN 500 MG: 500 TABLET ORAL at 11:02

## 2020-02-05 RX ADMIN — NITROGLYCERIN 0.4 MG: 0.4 TABLET, ORALLY DISINTEGRATING SUBLINGUAL at 09:02

## 2020-02-05 NOTE — ASSESSMENT & PLAN NOTE
Will do wound culture , podiatry consult -will need to rule out osteomyelitis , will do bone scan in AM   WILL start Rocephin/zyvox  2/1  D/C Rocephin and Zyvox   Start oral Doxycycline 100 mg po   2/2, 2/3, 2/4  As above   2/5  DC Doxycycline

## 2020-02-05 NOTE — PROGRESS NOTES
Called to room by patient reporting severe chest pain. Patient reports Midsternal, 7/10 chest pain. Gave one dose Nitro SL and chest pain improved. O2 sat was 84% and came up to 88%. Dr. Magaña notified, STAT troponin and ECG ordered. Patient claims this is not heart related, but caused from his lungs. Educated patient that Nitro would not help that pain. Patient refused second dose of Nitro SL. Pending ECG and lab draw for Troponin

## 2020-02-05 NOTE — ASSESSMENT & PLAN NOTE
2/5  EKG- non specific ST wave changed .  Troponin trend   Resume ASA, Plavx  Resume Isosorbide mononitrate

## 2020-02-05 NOTE — SUBJECTIVE & OBJECTIVE
Interval History:  Remains on O2 . Currently 5L/min via NC   Noted episodes of chest pain (  substernal , 7/10 intensity , relived with NTG) and Fever of 100.7 last night and again today at noon . S/P left ureteral stent exchange yesterday   EKG revealed NSR and nonspecific  T wave changed . Troponin 0.013> 0.26  Known H/O CAD.   V/Q scan done this morning -  Very low probability of pul embolism     Plan- Resume ASA, Plavix . Resume Isosorbide mononitrate            Get Blood cultures , UA, Urine culture            D/C Doxycycline ,start empiric Levofloxacin         Review of Systems   Constitutional: Positive for activity change, appetite change, chills, fatigue and fever.   HENT: Negative for sore throat.    Eyes: Negative for visual disturbance.   Respiratory: Positive for shortness of breath. Negative for cough and chest tightness.    Cardiovascular: Positive for chest pain. Negative for palpitations and leg swelling.   Gastrointestinal: Negative for abdominal distention, abdominal pain, constipation, diarrhea, nausea and vomiting.   Endocrine: Negative for polyuria.   Genitourinary: Negative for decreased urine volume, dysuria, flank pain, frequency and hematuria.   Musculoskeletal: Negative for back pain and gait problem.   Skin: Negative for rash.   Neurological: Negative for syncope, speech difficulty, weakness, light-headedness and headaches.   Psychiatric/Behavioral: Negative for confusion, hallucinations and sleep disturbance.     Objective:     Vital Signs (Most Recent):  Temp: (!) 100.6 °F (38.1 °C) (02/05/20 1119)  Pulse: 72 (02/05/20 1324)  Resp: 18 (02/05/20 1324)  BP: 135/66 (02/05/20 1050)  SpO2: (!) 90 % (02/05/20 1324) Vital Signs (24h Range):  Temp:  [97.8 °F (36.6 °C)-100.7 °F (38.2 °C)] 100.6 °F (38.1 °C)  Pulse:  [] 72  Resp:  [16-22] 18  SpO2:  [87 %-96 %] 90 %  BP: (135-165)/(66-81) 135/66     Weight: 80.9 kg (178 lb 5.6 oz)  Body mass index is 23.53 kg/m².    Intake/Output Summary  (Last 24 hours) at 2/5/2020 1525  Last data filed at 2/5/2020 0500  Gross per 24 hour   Intake 120 ml   Output 200 ml   Net -80 ml      Physical Exam   Constitutional: He is oriented to person, place, and time. He appears well-developed and well-nourished. No distress.   HENT:   Head: Normocephalic and atraumatic.   Mouth/Throat: No oropharyngeal exudate.   Eyes: Pupils are equal, round, and reactive to light. Conjunctivae and EOM are normal.   Neck: Normal range of motion. Neck supple. No JVD present. No thyromegaly present.   Cardiovascular: Normal rate, regular rhythm and normal heart sounds.   No murmur heard.  Pulmonary/Chest: Breath sounds normal. No respiratory distress. He has no wheezes. He has no rales. He exhibits no tenderness.   NC in place    Abdominal: Soft. Bowel sounds are normal. He exhibits no distension. There is no tenderness. There is no rebound and no guarding.   Musculoskeletal: Normal range of motion. He exhibits no edema.   S/P Rt BKA, Left transmetatarsal amputation    Lymphadenopathy:     He has no cervical adenopathy.   Neurological: He is alert and oriented to person, place, and time. He displays normal reflexes. No cranial nerve deficit or sensory deficit.   Skin: Skin is warm and dry. No rash noted. He is not diaphoretic.   Psychiatric: He has a normal mood and affect.       Significant Labs:   CBC:   Recent Labs   Lab 02/04/20  0506 02/05/20  0419   WBC 4.05 3.80*   HGB 8.8* 8.0*   HCT 28.8* 26.6*    163     CMP:   Recent Labs   Lab 02/04/20  0506 02/05/20  0419    135*   K 4.3 4.5    104   CO2 21* 22*   GLU 90 96   BUN 32* 24*   CREATININE 2.5* 2.2*   CALCIUM 8.8 8.2*   ANIONGAP 12 9   EGFRNONAA 25* 29*       Significant Imaging:   FINDINGS:  Perfusion: Mild patchy radiotracer distribution without wedge-shaped or segmental defect.    Ventilation: Patchy distribution of aerosolized radiotracer with some central airway entrapment.   Impression:       Very low  probability of pulmonary embolism.

## 2020-02-05 NOTE — ASSESSMENT & PLAN NOTE
Will continue home medication of norvasc  2/4  ARB Losartan discontinued   Add Hydralazine with Nitroglycerin   2/5  Resume home med isosorbide mononitrate

## 2020-02-05 NOTE — ANESTHESIA POSTPROCEDURE EVALUATION
Anesthesia Post Evaluation    Patient: Kodi Ribeiro    Procedure(s) Performed: Procedure(s) (LRB):  CYSTOSCOPY, WITH URETERAL STENT INSERTION (Left)  CYSTOSCOPY, WITH URETERAL STENT REMOVAL (Left)    Final Anesthesia Type: general    Patient location during evaluation: PACU  Patient participation: Yes- Able to Participate  Level of consciousness: awake and alert  Post-procedure vital signs: reviewed and stable  Pain management: adequate  Airway patency: patent    PONV status at discharge: No PONV  Anesthetic complications: no      Cardiovascular status: hemodynamically stable  Respiratory status: spontaneous ventilation  Hydration status: euvolemic  Follow-up not needed.          Vitals Value Taken Time   /72 2/5/2020  7:32 AM   Temp 36.6 °C (97.9 °F) 2/5/2020  7:32 AM   Pulse 82 2/5/2020  7:32 AM   Resp 22 2/5/2020  7:32 AM   SpO2 92 % 2/5/2020  7:32 AM         Event Time     Out of Recovery 02/04/2020 10:21:38          Pain/Rommel Score: Pain Rating Prior to Med Admin: 5 (2/4/2020  1:24 AM)  Pain Rating Post Med Admin: 0 (2/4/2020  2:24 AM)  Modified Rommel Score: 19 (2/4/2020 10:15 AM)

## 2020-02-05 NOTE — PROGRESS NOTES
[facility] Hospital Medicine  Progress Note    Patient Name: Kodi Ribeiro  MRN: [id number]  Patient Class: IP- Inpatient   Admission Date: 1/31/2020  Length of Stay: 5 days  Attending Physician: Markell Magaña MD  Primary Care Provider: Norma Nuñez MD        Subjective:     Principal Problem:Acute renal failure superimposed on chronic kidney disease        HPI:   69 year old man with extensive past medical and surgery history of    stroke, spinal cord disease, peripheral vascular disease, MI (per patient, 2000 & 2012), lipoma of colon, infection of aortic graft, HTN, HLD, hyperglycemia, horseshoe kidney, hemorrhoids, GERD. He was recently discharged from the Hospital on  1/15/2020. He presented at this time with chest pain that started few hours prior to presentation.Pain was described a sharp ,non radiating ,not associated with diaphoresis and worsened with exertion. He called his wife and EMS was called.   Since admission, Lab data showed   Component      Latest Ref Rng & Units 1/31/2020 1/30/2020 1/15/2020               Hemoglobin      14.0 - 18.0 g/dL 6.9 (L) 7.0 (L) 7.7 (L)     Component      Latest Ref Rng & Units 1/31/2020 1/30/2020 1/15/2020               Creatinine      0.5 - 1.4 mg/dL 6.6 (H) 6.0 (H) 2.4 (H)   He also reported increased pain from the left foot ulceration and when the dressing was opened-sánchez pus was seen .  He will be admitted for symptomatic anemia and acute renal failure .    Overview/Hospital Course:  1/31-  Severe left sided hydronephrosis with ureteral stent visualized. In setting of acute renal failure concern with this being cause. This was discussed with Dr. Brennen Maldonado who feels that hydronephrosis is a chronic issue and not sole cause of acute renal failure. Patient already has stent and may need IR consult for nephrostomy tube placement. Will consult IR on case. Attempted to contact patient's urologist earlier today Dr. Jin, left voicemail. Will  dc asa and plavix due to pending procedure. Currently receiving 1 unit prbc. Will transfuse < 7.      Discussed case with Dr. Jin. Concerned with outlet obstruction. Recommended getting bladder scan which was negative. Recommendations were to workup other causes of renal failure as left sided hydronephrosis alone should not cause acute renal failure with the right kidney working.      Started sterile water with 150meq bicarb this am. Cont to monitor urine output. Fena calculated was 2.2% indicating intrinsic renal disease.      Case was discussed with IR earlier. Concerned with bleeding due to asa and plavix use. Does not recommend nephrostomy tube at this time due to risks. If any procedure needs to be done, recommended ureteral stent changed instead as it is less invasive.     2/1  Pt has no new C/O today . SOB is at baseline . On O2 at 2L/min. BNP is elevated noted .  Continued on gentle hydration with Bicarb . Serum creatinine is slightly better 6.6>5.3   Off of diuretics , ASA and Plavix  Possible ureteral stent change on Monday by  (Urologist)     S/P 1 unit of P-RBC yesterday  . Pt has H/O   chronic iron def anemia and is followed in the Hematology clinic on a regular basis . Baseline Hemoglobin ranges 8 to 9.   Bone scan suspicious for left foot osteomyelitis possibly chronic and does not appear an active issue at this time. Podiatry consult obtained and plan is noted   Will D/C broad spectrum antibiotic . Pt does have left foot ulceration and will place pt on Oral Doxycycline for 5 to  7 days.       2/2-  Last night event  noted. Pt was transferred to ICU overnight due to acute worsening of hypoxia required BiPAP therapy for oxygenation .  IVF discontinued . Pt had received Lasix 60 mg IV x 1 dose   Serum creatinine down to 4.2   Repeat U/S kidneys again showed Left sided hydronephrosis which is unchanged from prior study on admit     CT chest without contrast revealed yary pleural effusion  L>R, airspace disease involving yary lung bases , evidence of pulmonary fibrosis , subpleural bulla and centrilobular emphysema     2/3  SOB is better . Currently on O2 at 2L/min . Downgrade to Telemetry today   Urology consult obtained . CT abd /pelvis revealed interval worsening appearance of chronic severe left-sided hydronephrosis, and progression of severe atrophy of the left renal cortex. Left-sided double-J ureteral stent in satisfactory position. Per Urology left ureteral stent exchange tomorrow to lower risk of  pyelonephritis and to preserve remaining left kidney function.     Serum creatinine is trending down suggestive of renal function recovery . 42> 3.6   Urine output is satisfactory     Placed back on gentle diuresis due to cardiac decompensation   CT chest c/w emphysema. Continue bronchodilator treatment     2/4    Pt underwent Cystoscopy  with placement of left ureteral stent today without immediate complication   Post procedure pt developed SOB with increased O2 demand . Initially placed on biPAP and later wean to NC at 5 L/min  Additional lasix 20 mg IV x 1 dose given . Pt appears anxious and anxiolytics prescribed.     Serum creatinine improved since yesterday . 2.5 today . 6.6> 3.6>2.5  H/H are stable after 1 unit of P-RBC transfusion since admission     2/5  Remains on O2 . Currently 5L/min via NC   Noted episodes of chest pain (  substernal , 7/10 intensity , relived with NTG) and Fever of 100.7 last night and again today at noon . S/P left ureteral stent exchange yesterday   EKG revealed NSR and nonspecific  T wave changed . Troponin 0.013> 0.26  Known H/O CAD.   V/Q scan done this morning -  Very low probability of pul embolism     Plan- Resume ASA, Plavix . Resume Isosorbide mononitrate            Get Blood cultures , UA, Urine culture            D/C Doxycycline ,start empiric Levofloxacin     Interval History:  Remains on O2 . Currently 5L/min via NC   Noted episodes of chest pain (   substernal , 7/10 intensity , relived with NTG) and Fever of 100.7 last night and again today at noon . S/P left ureteral stent exchange yesterday   EKG revealed NSR and nonspecific  T wave changed . Troponin 0.013> 0.26  Known H/O CAD.   V/Q scan done this morning -  Very low probability of pul embolism     Plan- Resume ASA, Plavix . Resume Isosorbide mononitrate            Get Blood cultures , UA, Urine culture            D/C Doxycycline ,start empiric Levofloxacin         Review of Systems   Constitutional: Positive for activity change, appetite change, chills, fatigue and fever.   HENT: Negative for sore throat.    Eyes: Negative for visual disturbance.   Respiratory: Positive for shortness of breath. Negative for cough and chest tightness.    Cardiovascular: Positive for chest pain. Negative for palpitations and leg swelling.   Gastrointestinal: Negative for abdominal distention, abdominal pain, constipation, diarrhea, nausea and vomiting.   Endocrine: Negative for polyuria.   Genitourinary: Negative for decreased urine volume, dysuria, flank pain, frequency and hematuria.   Musculoskeletal: Negative for back pain and gait problem.   Skin: Negative for rash.   Neurological: Negative for syncope, speech difficulty, weakness, light-headedness and headaches.   Psychiatric/Behavioral: Negative for confusion, hallucinations and sleep disturbance.     Objective:     Vital Signs (Most Recent):  Temp: (!) 100.6 °F (38.1 °C) (02/05/20 1119)  Pulse: 72 (02/05/20 1324)  Resp: 18 (02/05/20 1324)  BP: 135/66 (02/05/20 1050)  SpO2: (!) 90 % (02/05/20 1324) Vital Signs (24h Range):  Temp:  [97.8 °F (36.6 °C)-100.7 °F (38.2 °C)] 100.6 °F (38.1 °C)  Pulse:  [] 72  Resp:  [16-22] 18  SpO2:  [87 %-96 %] 90 %  BP: (135-165)/(66-81) 135/66     Weight: 80.9 kg (178 lb 5.6 oz)  Body mass index is 23.53 kg/m².    Intake/Output Summary (Last 24 hours) at 2/5/2020 1551  Last data filed at 2/5/2020 0500  Gross per 24 hour   Intake  120 ml   Output 200 ml   Net -80 ml      Physical Exam   Constitutional: He is oriented to person, place, and time. He appears well-developed and well-nourished. No distress.   HENT:   Head: Normocephalic and atraumatic.   Mouth/Throat: No oropharyngeal exudate.   Eyes: Pupils are equal, round, and reactive to light. Conjunctivae and EOM are normal.   Neck: Normal range of motion. Neck supple. No JVD present. No thyromegaly present.   Cardiovascular: Normal rate, regular rhythm and normal heart sounds.   No murmur heard.  Pulmonary/Chest: Breath sounds normal. No respiratory distress. He has no wheezes. He has no rales. He exhibits no tenderness.   NC in place    Abdominal: Soft. Bowel sounds are normal. He exhibits no distension. There is no tenderness. There is no rebound and no guarding.   Musculoskeletal: Normal range of motion. He exhibits no edema.   S/P Rt BKA, Left transmetatarsal amputation    Lymphadenopathy:     He has no cervical adenopathy.   Neurological: He is alert and oriented to person, place, and time. He displays normal reflexes. No cranial nerve deficit or sensory deficit.   Skin: Skin is warm and dry. No rash noted. He is not diaphoretic.   Psychiatric: He has a normal mood and affect.       Significant Labs:   CBC:   Recent Labs   Lab 02/04/20  0506 02/05/20  0419   WBC 4.05 3.80*   HGB 8.8* 8.0*   HCT 28.8* 26.6*    163     CMP:   Recent Labs   Lab 02/04/20  0506 02/05/20  0419    135*   K 4.3 4.5    104   CO2 21* 22*   GLU 90 96   BUN 32* 24*   CREATININE 2.5* 2.2*   CALCIUM 8.8 8.2*   ANIONGAP 12 9   EGFRNONAA 25* 29*       Significant Imaging:   FINDINGS:  Perfusion: Mild patchy radiotracer distribution without wedge-shaped or segmental defect.    Ventilation: Patchy distribution of aerosolized radiotracer with some central airway entrapment.   Impression:       Very low probability of pulmonary embolism.           Assessment/Plan:      * Acute renal failure superimposed  on chronic kidney disease    Will do renal ultrasound,start gentle hydration, nephrology consult, check renal function in AM     2/1  Nephrology consult obtained   Continue gentle hydration   Serum creatinine is trending down slowly   Monitor volume status   H/O Left ureteral stricture and stent , now presenting with left hydronephrosis . Pt will have left ureteral stent change possibly on Monday 2/3/20 by    2/2  IVF discontinued due to acute exacerbation of diastolic CHF  Monitor volume status   Monitor Renal indices   2/3  Renal indices are better noted in AM labs   Placed back on gentle diuresis   Monitor renal indices   2/4  Renal indices continue to improve   S/P Cystoscopy and left ureteral stent exchange today   Continue gentle diuresis   2/5  Renal indices continue to improve 2.5> 2.2     Hydronephrosis of left kidney  -H/O Left ureteral stricture and ureteral stent . Will have left ureteral stent change possibly on Monday by    2/3  Left ureteral stent exchange tomorrow   2/4  Cystoscopy and left ureteral stent exchange done today   2/5  New onset fever . Will get UA, Urine culture   Empiric Levofloxacin oral       Acute respiratory failure with hypoxia  2/2  Overnight pt developed worsening SOB and hypoxia likely due to acute exacerbation of Chronic diastolic HF  IVF discontinued   Pt had received Lasix 60 mg IV x 1 dose   Monitor volume status   Monitor renal indices   Wean FiO2 as tolerated   CT chest revealed yary pleural effusion L>R, airspace disease involving yary lung bases , evidence of pulmonary fibrosis , subpleural bulla and centrilobular emphysema    Bronchodilator treatment    2/3  Improving   Continue gentle diuresis  Wean  FiO2   Continue bronchodilators   2/4  Continue supp O2 and wean as tolerated . Keep O2 sat 92% or higher   Need Home O2 eval   2/5-  V/Q scan - low probability for PE.   Wean FiO2 as tolerated       Acute on chronic diastolic heart  failure  2/2  Acute decompensation noted overnight   IVF discontinued   Pt had received Lasix 60 mg IV x 1 dose   Clinical improvement noted with diuresis   Monitor volume status   Monitor renal indices   2/3  Start gentle diuresis with oral Furosemide 20 mg daily   2/4  Additional Lasix 20 mg IV x 1 dose given today due to worsening hypoxia and increased O2 demand   Continue BB  ARB discontinued   Add Hydralazine with nitroglycerin.   2/5-  BNP elevated noted . Continue Lasix IV until achieve euvolemia     Chest pain, midsternal, H/O known CAD   2/5  EKG- non specific ST wave changed .  Troponin trend   Resume ASA, Plavx  Resume Isosorbide mononitrate       Acute on chronic anemia    Will transfuse one unit of packed cell-monitor cbc in AM    2/1  Get iron study , B12, Folic  acid.   2/2, 2/3   Low B12, Folic acid and low iron noted .Repalce b12, folic acid and iron orally   2/4  H/H are stable post transfusion   2/5  Stable     CKD (chronic kidney disease) stage 3, GFR 30-59 ml/min  - Monitor renal indices   -Renal indices continue to improve       PVD (peripheral vascular disease)        Foot ulcer - Left Foot    Will do wound culture , podiatry consult -will need to rule out osteomyelitis , will do bone scan in AM   WILL start Rocephin/zyvox  2/1  D/C Rocephin and Zyvox   Start oral Doxycycline 100 mg po   2/2, 2/3, 2/4  As above   2/5  DC Doxycycline     Essential hypertension    Will continue home medication of norvasc  2/4  ARB Losartan discontinued   Add Hydralazine with Nitroglycerin   2/5  Resume home med isosorbide mononitrate     Ureteral stent retained  2/3  Stent exchange tomorrow         VTE Risk Mitigation (From admission, onward)         Ordered     enoxaparin injection 40 mg  Daily      02/05/20 0828     Reason for No Pharmacological VTE Prophylaxis  Once     Question:  Reasons:  Answer:  Physician Provided (leave comment)    01/31/20 0317     IP VTE HIGH RISK PATIENT  Once      01/31/20 0317                       Markell Magaña MD  Department of Hospital Medicine   [facility]

## 2020-02-05 NOTE — ASSESSMENT & PLAN NOTE
Will transfuse one unit of packed cell-monitor cbc in AM    2/1  Get iron study , B12, Folic  acid.   2/2, 2/3   Low B12, Folic acid and low iron noted .Repalce b12, folic acid and iron orally   2/4  H/H are stable post transfusion   2/5  Stable

## 2020-02-05 NOTE — PROGRESS NOTES
Home Oxygen Evaluation    Date Performed: 2/5/2020    1) Patient's Home O2 Sat on room air, while at rest: 87        If O2 sats on room air at rest are 88% or below, patient qualifies. No additional testing needed. Document N/A in steps 2 and 3. If 89% or above, complete steps 2.      2) Patient's O2 Sat on room air while exercising: NA        If O2 sats on room air while exercising remain 89% or above patient does not qualify, no further testing needed Document N/A in step 3. If O2 sats on room air while exercising are 88% or below, continue to step 3.      3) Patient's O2 Sat while exercising on O2: NA at NA LPM         (Must show improvement from #2 for patients to qualify)    If O2 sats improve on oxygen, patient qualifies for portable oxygen. If not, the patient does not qualify.

## 2020-02-05 NOTE — PROGRESS NOTES
Pharmacist Renal Dose Adjustment Note    Kodi Ribeiro is a 70 y.o. male being treated with the medication Lovenox    Patient Data:    Vital Signs (Most Recent):  Temp: 97.9 °F (36.6 °C) (02/05/20 0732)  Pulse: 82 (02/05/20 0732)  Resp: (!) 22 (02/05/20 0732)  BP: (!) 152/72 (02/05/20 0732)  SpO2: (!) 92 % (02/05/20 0732)   Vital Signs (72h Range):  Temp:  [96.2 °F (35.7 °C)-100.7 °F (38.2 °C)]   Pulse:  []   Resp:  [11-23]   BP: (115-175)/(44-81)   SpO2:  [87 %-100 %]      Recent Labs   Lab 02/03/20  0332 02/04/20  0506 02/05/20  0419   CREATININE 3.6* 2.5* 2.2*     Serum creatinine: 2.2 mg/dL (H) 02/05/20 0419  Estimated creatinine clearance: 34.8 mL/min (A)    Medication:Lovenox dose: 30 mg frequency q24h will be changed to medication:Lovenox dose:40 mg frequency:q24h due to improved CrCl.    Pharmacist's Name: Nury Cody  Pharmacist's Extension: [phone]

## 2020-02-05 NOTE — ASSESSMENT & PLAN NOTE
Will do renal ultrasound,start gentle hydration, nephrology consult, check renal function in AM     2/1  Nephrology consult obtained   Continue gentle hydration   Serum creatinine is trending down slowly   Monitor volume status   H/O Left ureteral stricture and stent , now presenting with left hydronephrosis . Pt will have left ureteral stent change possibly on Monday 2/3/20 by    2/2  IVF discontinued due to acute exacerbation of diastolic CHF  Monitor volume status   Monitor Renal indices   2/3  Renal indices are better noted in AM labs   Placed back on gentle diuresis   Monitor renal indices   2/4  Renal indices continue to improve   S/P Cystoscopy and left ureteral stent exchange today   Continue gentle diuresis   2/5  Renal indices continue to improve 2.5> 2.2

## 2020-02-05 NOTE — ASSESSMENT & PLAN NOTE
-H/O Left ureteral stricture and ureteral stent . Will have left ureteral stent change possibly on Monday by    2/3  Left ureteral stent exchange tomorrow   2/4  Cystoscopy and left ureteral stent exchange done today   2/5  New onset fever . Will get UA, Urine culture   Empiric Levofloxacin oral      yesterday

## 2020-02-05 NOTE — PLAN OF CARE
Pt in bed AAOx4. Plan of Care reviewed, Verbalized understanding.   Patient remained free from falls, fall precautions in place.  Patient is NSR on monitor.VSS.  Patient refusal to sleep with CPAP on.   Oxygen saturation drops to 88% causing chest pain. Nitroglycerin sublingual administered as needed.   Nasal cannula in use at 5L. Xanax ordered and administered.  Call bell and personal belongings within reach. Hourly rounding complete. Reminded to call for assistance. No complaints at this time. Will continue to monitor.

## 2020-02-05 NOTE — ASSESSMENT & PLAN NOTE
2/2  Overnight pt developed worsening SOB and hypoxia likely due to acute exacerbation of Chronic diastolic HF  IVF discontinued   Pt had received Lasix 60 mg IV x 1 dose   Monitor volume status   Monitor renal indices   Wean FiO2 as tolerated   CT chest revealed yary pleural effusion L>R, airspace disease involving yary lung bases , evidence of pulmonary fibrosis , subpleural bulla and centrilobular emphysema    Bronchodilator treatment    2/3  Improving   Continue gentle diuresis  Wean  FiO2   Continue bronchodilators   2/4  Continue supp O2 and wean as tolerated . Keep O2 sat 92% or higher   Need Home O2 eval   2/5-  V/Q scan - low probability for PE.   Wean FiO2 as tolerated

## 2020-02-06 VITALS
TEMPERATURE: 98 F | BODY MASS INDEX: 22.88 KG/M2 | RESPIRATION RATE: 18 BRPM | SYSTOLIC BLOOD PRESSURE: 126 MMHG | HEART RATE: 76 BPM | HEIGHT: 73 IN | OXYGEN SATURATION: 94 % | WEIGHT: 172.63 LBS | DIASTOLIC BLOOD PRESSURE: 59 MMHG

## 2020-02-06 DIAGNOSIS — N18.30 CKD (CHRONIC KIDNEY DISEASE) STAGE 3, GFR 30-59 ML/MIN: Primary | Chronic | ICD-10-CM

## 2020-02-06 PROBLEM — N17.9 ACUTE RENAL FAILURE SUPERIMPOSED ON CHRONIC KIDNEY DISEASE: Status: RESOLVED | Noted: 2019-03-27 | Resolved: 2020-02-06

## 2020-02-06 PROBLEM — D64.9 ACUTE ON CHRONIC ANEMIA: Status: RESOLVED | Noted: 2020-01-31 | Resolved: 2020-02-06

## 2020-02-06 PROBLEM — J44.1 COPD EXACERBATION: Status: ACTIVE | Noted: 2020-02-06

## 2020-02-06 PROBLEM — N18.9 ACUTE RENAL FAILURE SUPERIMPOSED ON CHRONIC KIDNEY DISEASE: Status: RESOLVED | Noted: 2019-03-27 | Resolved: 2020-02-06

## 2020-02-06 PROBLEM — I50.33 ACUTE ON CHRONIC DIASTOLIC HEART FAILURE: Status: RESOLVED | Noted: 2020-01-13 | Resolved: 2020-02-06

## 2020-02-06 LAB
ANION GAP SERPL CALC-SCNC: 8 MMOL/L (ref 8–16)
BACTERIA BLD CULT: NORMAL
BACTERIA BLD CULT: NORMAL
BASOPHILS # BLD AUTO: 0.01 K/UL (ref 0–0.2)
BASOPHILS NFR BLD: 0.3 % (ref 0–1.9)
BILIRUB UR QL STRIP: NEGATIVE
BUN SERPL-MCNC: 25 MG/DL (ref 8–23)
CALCIUM SERPL-MCNC: 8.6 MG/DL (ref 8.7–10.5)
CHLORIDE SERPL-SCNC: 103 MMOL/L (ref 95–110)
CLARITY UR: CLEAR
CO2 SERPL-SCNC: 23 MMOL/L (ref 23–29)
COLOR UR: YELLOW
CREAT SERPL-MCNC: 2.2 MG/DL (ref 0.5–1.4)
CYSTATIN C SERPL-MCNC: 2.51 MG/L (ref 0.82–1.53)
DIFFERENTIAL METHOD: ABNORMAL
EOSINOPHIL # BLD AUTO: 0.2 K/UL (ref 0–0.5)
EOSINOPHIL NFR BLD: 5 % (ref 0–8)
ERYTHROCYTE [DISTWIDTH] IN BLOOD BY AUTOMATED COUNT: 15.4 % (ref 11.5–14.5)
EST. GFR  (AFRICAN AMERICAN): 34 ML/MIN/1.73 M^2
EST. GFR  (NON AFRICAN AMERICAN): 29 ML/MIN/1.73 M^2
GFR/BSA.PRED SERPLBLD CYS-BASED-ARV: 22 ML/MIN/BSA
GLUCOSE SERPL-MCNC: 107 MG/DL (ref 70–110)
GLUCOSE UR QL STRIP: NEGATIVE
HCT VFR BLD AUTO: 25.6 % (ref 40–54)
HGB BLD-MCNC: 7.5 G/DL (ref 14–18)
HGB UR QL STRIP: ABNORMAL
IMM GRANULOCYTES # BLD AUTO: 0.03 K/UL (ref 0–0.04)
IMM GRANULOCYTES NFR BLD AUTO: 0.8 % (ref 0–0.5)
KETONES UR QL STRIP: NEGATIVE
LEUKOCYTE ESTERASE UR QL STRIP: NEGATIVE
LYMPHOCYTES # BLD AUTO: 0.5 K/UL (ref 1–4.8)
LYMPHOCYTES NFR BLD: 13.3 % (ref 18–48)
MAGNESIUM SERPL-MCNC: 1.4 MG/DL (ref 1.6–2.6)
MCH RBC QN AUTO: 26.7 PG (ref 27–31)
MCHC RBC AUTO-ENTMCNC: 29.3 G/DL (ref 32–36)
MCV RBC AUTO: 91 FL (ref 82–98)
MONOCYTES # BLD AUTO: 0.3 K/UL (ref 0.3–1)
MONOCYTES NFR BLD: 8.9 % (ref 4–15)
NEUTROPHILS # BLD AUTO: 2.6 K/UL (ref 1.8–7.7)
NEUTROPHILS NFR BLD: 71.7 % (ref 38–73)
NITRITE UR QL STRIP: NEGATIVE
NRBC BLD-RTO: 0 /100 WBC
PH UR STRIP: 7 [PH] (ref 5–8)
PLATELET # BLD AUTO: 159 K/UL (ref 150–350)
PMV BLD AUTO: 10 FL (ref 9.2–12.9)
POCT GLUCOSE: 112 MG/DL (ref 70–110)
POCT GLUCOSE: 98 MG/DL (ref 70–110)
POTASSIUM SERPL-SCNC: 4.6 MMOL/L (ref 3.5–5.1)
PROT UR QL STRIP: ABNORMAL
RBC # BLD AUTO: 2.81 M/UL (ref 4.6–6.2)
SODIUM SERPL-SCNC: 134 MMOL/L (ref 136–145)
SP GR UR STRIP: 1.01 (ref 1–1.03)
URN SPEC COLLECT METH UR: ABNORMAL
UROBILINOGEN UR STRIP-ACNC: NEGATIVE EU/DL
WBC # BLD AUTO: 3.61 K/UL (ref 3.9–12.7)

## 2020-02-06 PROCEDURE — 93010 ELECTROCARDIOGRAM REPORT: CPT | Mod: HCNC,,, | Performed by: INTERNAL MEDICINE

## 2020-02-06 PROCEDURE — 25000003 PHARM REV CODE 250: Mod: HCNC | Performed by: UROLOGY

## 2020-02-06 PROCEDURE — 63600175 PHARM REV CODE 636 W HCPCS: Mod: HCNC | Performed by: INTERNAL MEDICINE

## 2020-02-06 PROCEDURE — 99233 SBSQ HOSP IP/OBS HIGH 50: CPT | Mod: HCNC,,, | Performed by: UROLOGY

## 2020-02-06 PROCEDURE — 94761 N-INVAS EAR/PLS OXIMETRY MLT: CPT | Mod: HCNC

## 2020-02-06 PROCEDURE — 93010 EKG 12-LEAD: ICD-10-PCS | Mod: HCNC,,, | Performed by: INTERNAL MEDICINE

## 2020-02-06 PROCEDURE — 25000003 PHARM REV CODE 250: Mod: HCNC | Performed by: INTERNAL MEDICINE

## 2020-02-06 PROCEDURE — 99900035 HC TECH TIME PER 15 MIN (STAT): Mod: HCNC

## 2020-02-06 PROCEDURE — 94644 CONT INHLJ TX 1ST HOUR: CPT | Mod: HCNC

## 2020-02-06 PROCEDURE — 27000221 HC OXYGEN, UP TO 24 HOURS: Mod: HCNC

## 2020-02-06 PROCEDURE — 80048 BASIC METABOLIC PNL TOTAL CA: CPT | Mod: HCNC

## 2020-02-06 PROCEDURE — 83735 ASSAY OF MAGNESIUM: CPT | Mod: HCNC

## 2020-02-06 PROCEDURE — 63600175 PHARM REV CODE 636 W HCPCS: Mod: HCNC | Performed by: UROLOGY

## 2020-02-06 PROCEDURE — 94640 AIRWAY INHALATION TREATMENT: CPT | Mod: HCNC

## 2020-02-06 PROCEDURE — 25000242 PHARM REV CODE 250 ALT 637 W/ HCPCS: Mod: HCNC | Performed by: INTERNAL MEDICINE

## 2020-02-06 PROCEDURE — 93005 ELECTROCARDIOGRAM TRACING: CPT | Mod: HCNC

## 2020-02-06 PROCEDURE — 99900031 HC PATIENT EDUCATION (STAT): Mod: HCNC

## 2020-02-06 PROCEDURE — 99233 PR SUBSEQUENT HOSPITAL CARE,LEVL III: ICD-10-PCS | Mod: HCNC,,, | Performed by: UROLOGY

## 2020-02-06 PROCEDURE — 36415 COLL VENOUS BLD VENIPUNCTURE: CPT | Mod: HCNC

## 2020-02-06 PROCEDURE — 85025 COMPLETE CBC W/AUTO DIFF WBC: CPT | Mod: HCNC

## 2020-02-06 RX ORDER — FUROSEMIDE 20 MG/1
TABLET ORAL
Qty: 40 TABLET | Refills: 0 | Status: SHIPPED | OUTPATIENT
Start: 2020-02-06 | End: 2020-02-28 | Stop reason: SDUPTHER

## 2020-02-06 RX ORDER — HYDRALAZINE HYDROCHLORIDE 25 MG/1
25 TABLET, FILM COATED ORAL EVERY 12 HOURS
Qty: 60 TABLET | Refills: 0 | Status: SHIPPED | OUTPATIENT
Start: 2020-02-06 | End: 2020-02-28 | Stop reason: SDUPTHER

## 2020-02-06 RX ORDER — FUROSEMIDE 10 MG/ML
20 INJECTION INTRAMUSCULAR; INTRAVENOUS ONCE
Status: COMPLETED | OUTPATIENT
Start: 2020-02-06 | End: 2020-02-06

## 2020-02-06 RX ORDER — MAGNESIUM SULFATE HEPTAHYDRATE 40 MG/ML
2 INJECTION, SOLUTION INTRAVENOUS ONCE
Status: COMPLETED | OUTPATIENT
Start: 2020-02-06 | End: 2020-02-06

## 2020-02-06 RX ORDER — FERROUS GLUCONATE 324(38)MG
324 TABLET ORAL 2 TIMES DAILY WITH MEALS
Qty: 60 TABLET | Refills: 0 | Status: SHIPPED | OUTPATIENT
Start: 2020-02-06 | End: 2020-02-28 | Stop reason: SDUPTHER

## 2020-02-06 RX ORDER — IPRATROPIUM BROMIDE AND ALBUTEROL SULFATE 2.5; .5 MG/3ML; MG/3ML
3 SOLUTION RESPIRATORY (INHALATION) EVERY 8 HOURS PRN
Qty: 90 ML | Refills: 0 | Status: SHIPPED | OUTPATIENT
Start: 2020-02-06 | End: 2021-01-01

## 2020-02-06 RX ADMIN — NITROGLYCERIN 0.4 MG: 0.4 TABLET, ORALLY DISINTEGRATING SUBLINGUAL at 08:02

## 2020-02-06 RX ADMIN — IPRATROPIUM BROMIDE AND ALBUTEROL SULFATE 3 ML: .5; 3 SOLUTION RESPIRATORY (INHALATION) at 07:02

## 2020-02-06 RX ADMIN — IPRATROPIUM BROMIDE AND ALBUTEROL SULFATE 3 ML: .5; 3 SOLUTION RESPIRATORY (INHALATION) at 12:02

## 2020-02-06 RX ADMIN — MAGNESIUM SULFATE 2 G: 2 INJECTION INTRAVENOUS at 11:02

## 2020-02-06 RX ADMIN — PANTOPRAZOLE SODIUM 40 MG: 40 TABLET, DELAYED RELEASE ORAL at 08:02

## 2020-02-06 RX ADMIN — FERROUS GLUCONATE TAB 324 MG (37.5 MG ELEMENTAL IRON) 324 MG: 324 (37.5 FE) TAB at 08:02

## 2020-02-06 RX ADMIN — AMLODIPINE BESYLATE 10 MG: 10 TABLET ORAL at 08:02

## 2020-02-06 RX ADMIN — ASPIRIN 81 MG: 81 TABLET, COATED ORAL at 08:02

## 2020-02-06 RX ADMIN — ISOSORBIDE MONONITRATE 120 MG: 60 TABLET, EXTENDED RELEASE ORAL at 08:02

## 2020-02-06 RX ADMIN — HYDRALAZINE HYDROCHLORIDE 10 MG: 10 TABLET, FILM COATED ORAL at 08:02

## 2020-02-06 RX ADMIN — CLOPIDOGREL BISULFATE 75 MG: 75 TABLET ORAL at 08:02

## 2020-02-06 RX ADMIN — Medication 1 TABLET: at 08:02

## 2020-02-06 RX ADMIN — CARVEDILOL 25 MG: 12.5 TABLET, FILM COATED ORAL at 08:02

## 2020-02-06 RX ADMIN — FUROSEMIDE 20 MG: 10 INJECTION, SOLUTION INTRAMUSCULAR; INTRAVENOUS at 11:02

## 2020-02-06 RX ADMIN — ALPRAZOLAM 0.5 MG: 0.5 TABLET ORAL at 08:02

## 2020-02-06 NOTE — PLAN OF CARE
To [facility]  Spoke with patient's spouse , Laury. She stated that they have had home health in the past. Preference letter obtained via telephone, witnessed. Referral faxed via [facility] to [facility]. Notified [facility] Liaison of discharge.       02/06/20 1206   Post-Acute Status   Post-Acute Authorization Home Health/Hospice   Home Health/Hospice Status Referrals Sent

## 2020-02-06 NOTE — PROGRESS NOTES
Chief Complaint: Left ureteral stricture with stent    HPI:   2/6/20: Cr had fallen well prior to stent change; old stent was patent and new stent in place.  Cr now at baseline, no discomfort from stent.  Left kidney likely dysfunctional with chronic hydronephrosis but stent does reduce pyelonephritis risk while maintaining what function it might have.  Pt should be thought of as solitary kidney from a med/planning viewpoint.  2/4/20: Ready for left stent change today  2/3/20: Was to have his stent changed but other acute issues precluded clearance since his last visit.  Seems to be better overall.  CT today shows severe right hydronephrosis; compred to CT 3 years ago that showed moderate right hydronephrosis.  Seems to void well, nocturia x1-2.  Reviewed history in detail. Stent likely patent given history.  6/3/19: Time to change left stent, will arrange.  Reviewed history in detail.  Needs US.  Had a heart cath and needs a CABG but can't be done due to prior reconstruction. Been treated for meningitis recently and still has neck pain.  5/17/18: Time to change left stent, will arrange.  No acute changes.  4/24/17: Due for left stent change, will arrange.  4/22/16: No changes in last year feeling very well.  Here to replace left stent.  Says he is doing great.  4/22/15: Doing fine with stent change. Stent had no problems after a year; RTC 3/1/16 to plan for next change.   3/19/15: Pt had his stent replaced 1/2014 but has not been back since.  He had major surgery for bowel abscess in the months after he saw me with a lot of vascular reconstruction.  Here to discuss replacement of his left ureteral stent.  Arpan kidney was  in the process.  1/17/14: Pt returns for f/u.  Recent CT shows stent still in place with no urolithiasis.  He has had a fem-fem bypass and his legs are working much better; he felt so good.    10/2012: Pt was found to have left ureteral stricture secondary to AAA repair. A stent was  placed in May and then replaced by me last week. He has a long stricture of the middle ureter, such that a reimplant would require a psoas hitch/boari flap reaching to the upper ureter. This is complicated by the patient having a horseshoe kidney so nephropexy with proximal mobilization would be impractical. He is tolerating the stent well and is practically unaware of its presence.  >>> he was to have Mag3 and  f/u from this appt in 5 mo for stent replacement but did not return, Mag3 showed improved drainage with stent..... <<<  9/2012: About 3 months ago pt had severe left kidney infection and had an MI during the same period. He was diagnosed as having a left ureteral obstruction at the level of the iliac crest. A left ureteral stent was placed. He had a Mag3 study prior to stent placement that showed normal uptake bilaterally, normal right and delayed left excretion (44 min). Recent MI caused some damage but no further stenting or CABG indicated now. No history of kidney stones. Had a full body scan in 2008 that showed left hydroureter and had a stent placed at that time and it was removed 8 months later with no plan for followup. Current stent has been in place for two months. No LUTS. Pain 0/10. Had an episode of gross hematuria one time last week.    Allergies:  Morphine    Medications:  has a current medication list which includes the following prescription(s): amlodipine, aspirin, carvedilol, clopidogrel, docusate sodium, isosorbide mononitrate, losartan, nitroglycerin, omeprazole, oxycodone-acetaminophen, pantoprazole, pravastatin, sertraline, and triamcinolone acetonide 0.1%.    Review of Systems:  General: No fever, chills, fatigability, or weight loss.  Skin: No rashes, itching, or changes in color or texture of skin.  Chest: Denies MACKEY, cyanosis, wheezing, cough, and sputum production.  Abdomen: Appetite fine. No weight loss. Denies diarrhea, abdominal pain, hematemesis, or blood in  stool.  Musculoskeletal: No joint stiffness or swelling. Denies back pain.  : As above.  All other review of systems negative.    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    PSH:   has a past surgical history that includes Aorta - bilateral femoral artery bypass graft (2014); Coronary angioplasty with stent (2000); Abdominal aortic aneurysm repair; Ureteral stent placement (Left); right below knee amputation (2009 (approx)); Foot amputation through metatarsal (1996); Lung lobectomy (Right, 1970s); Abdominal aortic aneurysm repair (1996/2014); Kidney surgery (2014); Foot surgery (Bilateral, 1980's); Amputation; Tonsillectomy (1955 aprox); Small intestine surgery (2014); Cystoscopy w/ ureteral stent placement (Left, 5/29/2018); Cystoscopy w/ retrogrades (Left, 5/29/2018); Cystoscopy w/ ureteral stent removal (Left, 5/29/2018); Left heart catheterization (Left, 3/7/2019); Cystoscopy w/ ureteral stent placement (Left, 2/4/2020); and Cystoscopy w/ ureteral stent removal (Left, 2/4/2020).    FamHx: family history includes COPD in his mother; Cancer in his mother; Diabetes in his daughter; Heart disease in his father.    SocHx:  reports that he quit smoking about 11 years ago. He has a 15.00 pack-year smoking history. He has never used  smokeless tobacco. He reports that he does not drink alcohol or use drugs.      Physical Exam:  Vitals:    02/06/20 0742   BP:    Pulse: 81   Resp: 18   Temp:      General: A&Ox3, no apparent distress, no deformities  Neck: No masses, normal thyroid  Abdomen: Soft, NT, ND  Skin: The skin is warm and dry. No jaundice.  Ext: No c/c/e.  : deferred to cysto    Labs/Studies:   PSA    11/13: 0.5    12/14: 0.4  Urinalysis performed in clinic, summary: UA normal    Impression/Plan:   1. Hydronephrosis could be physiologic due to chronic backpressure from the bladder. Stent was patent at 1.5 years placement.  2. RTC 11 months  3. Med changes have sig improved renal function with the old stent in place  4. Possible that left renal function is so poor that stent is not a factor; will change to lower risk of pyelonephritis and to promote what function from it is possible.

## 2020-02-06 NOTE — PHYSICIAN QUERY
PT Name: Kodi Ribeiro  MR #: [id number]    Physician Query Form -Present on Admission (POA) Diagnosis Clarification     CDS/: Leilani Garcia RN              Contact information:  [phone]    This form is a permanent document in the medical record.     Query Date: February 6, 2020    By submitting this query, we are merely seeking further clarification of documentation. Please utilize your independent clinical judgment when addressing the question(s) below.       The Medical record contains the following:    Diagnosis      Supporting Clinical Information   Location in Medical Record   Acute on chronic diastolic CHF         Acute on chronic diastolic heart failure 2/2   Acute decompensation noted overnight     CC:  Chest pain  Positive for shortness of breath  BNP 1253  CXR results: Pulmonary Congestion  Clinical impression:  Pulmonary congestio    BNP= 1384  BNP= 1253  BNP= 1554    there is a pattern of pulmonary vascular congestion and diffuse coarsening of the interstitium.  Right mid chest surgical staple line with adjacent discoid opacity, fissural fluid versus atelectasis, less likely active infiltrate.  Given the interstitial coarsening and vascular congestion, can not exclude interstitial edema.  There is peripheral left lung base opacity, concerning for infiltrate/pneumonia    Significant improvement of the chest with near total clearing of the infiltrates and/or congestive changes.  Mild residual interstitial/vascular prominence.  Can not exclude a minimal right effusion.    Interval development of combination airspace and interstitial infiltrate peripheral right upper and right lower lobe distribution, pulmonary edema versus pneumonia.  Minimal infiltrate peripheral left lower chest.    Lasix 20mg IVP x 2  Lasix 60mg IVP x 2     2/2 Hosp med PN      1/30 ED MD PN            1/30 Lab  1/31 Lab  2/5 Lab    1/30 CXR              1/31 CXR        2/2 CXR      2/4, 2/5 MAR  2/2 MAR                Doctor, Please specify Present On Admission (POA) status of  Acute on chronic diastolic CHF.    [  ] Present on Admission    [ x ] Not Present on Admission   [  ] Clinically Undetermined

## 2020-02-06 NOTE — NURSING
P c/o chest pain this morning NTG admin as per orders, EKG obtained, MD notified, no new orders received at this time will cont to monitor throughout the day

## 2020-02-06 NOTE — PLAN OF CARE
Patient AAOX 4. VSS..  Patient remained afebrile throughout shift.  Patient remained free of falls this shift  Patient 0/10 of pain this shift  Plan of care reviewed.  Patient verbalized understanding.  Patient moving/turning with assistance  Frequent weight shifting encouraged.  Patient NSR on monitor  Bed low, side rails up x2, wheels locked, call light in reach.  Bed alarm maintained for safety.  Patient instructed to call for assistance.  Hourly rounding completed.  Will continue to monitor.

## 2020-02-06 NOTE — PHYSICIAN QUERY
PT Name: Kodi Ribeiro  MR #: [id number]  Physician Query Form - Renal Condition Clarification     CDS/: Leilani Garcia RN              Contact information:  [phone]    This form is a permanent document in the medical record.     QueryDate: February 6, 2020    By submitting this query, we are merely seeking further clarification of documentation. Please utilize your independent clinical judgment when addressing the question(s) below.    The Medical record contains the following:   Indicator Supporting Clinical Findings Location in Medical Record    Kidney (Renal) Insufficiency     x Kidney (Renal) Failure / Injury Acute renal failure superimposed on CKD    Fena calculated was 2.2% indicating intrinsic renal disease   2/5 Hosp med PN    Nephrotoxic Agents     x BUN/Creatinine GFR Bun/cr= 62/ 6.0   GFR= 8.6  Bun/cr= 61/ 6.6   GFR= 8  Bun/cr= 58/ 5.3   GFR= 10  Bun/cr= 48/ 4.2   GFR= 13  Bun/cr= 45/ 3.6   GFR= 16  Bun/cr= 32/ 3.6   GFR= 25  Bun/cr= 32/ 2.5   GFR= 25   1/30 Lab  1/31 Lab  2/1 Lab  2/2 Lab  2/3 Lab  2/4 Lab  2/5 Lab   x Urine: Casts         Eosinophils Hyaline cast= 2    Urine na= 39   1/31 Urinalysis    Dehydration      Nausea/Vomiting      Dialysis/CRRT     x Treatment: NS 75cc/hr  Sterile water with sodium bicarb infusion @ 75-125cc/hr      Urinalysis  BMP daily  Input and output   1/31 MAR  1/31- 2/2 MAR      1/30-2/5 NSG orders    Other:      Acute Kidney Injury / Acute Renal Failure has different defining criteria. A generally accepted guideline  is:   A greater than 100% (2X) rise in serum creatinine from baseline* occurring during the course of a single hospital stay.   *Baseline as determined by the providers judgment and consideration of previous lab values and other documentation, if available.    A diagnosis of Acute Kidney Injury/ Acute Renal Failure should incorporate abnormal labs and clinical findings that are clinically significant      References: 1. Chun ricketts al. Acute  renal failure-definition, outcome measures, animal models, fluid therapy and information technology needs: the Second International Consensus Conference of the Acute Dialysis Quality Initiative (ADQI) Group. Crit Care 2004; 8:B204; 2. Liliane et al. Acute Kidney Injury Network: report of an initiative to improve outcomes in acute kidney injury. Crit Care 2007; 11:R31; 3. Kidney Disease: Improving Global Outcomes (KDIGO). Acute Kidney Injury Work Group. KDIGO clinical practice guidelines for acute kidney injury. Kidney Int Suppl 2012; 2:1.    The clinical guidelines noted below is only a system guideline, it does not replace the providers clinical judgment.  Provider, please specify the diagnosis or diagnoses associated with above clinical findings.          Please further specify acute kidney injury:    [   ] Acute Kidney Failure/Injury with Tubular Necrosis    Damage to the tubule cells of the kidney. Common triggers: shock, hypotension, IV contrast, rhabdomyolysis, medications     [   ] Other Acute Kidney Failure/Injury (please specify): ____________       [ x  ] Unspecified Acute Kidney Failure/Injury        [   ] Other (please specify): _________________________________     [   ]  Clinically Undetermined       Please document in your progress notes daily for the duration of treatment until resolved and include in your discharge summary.

## 2020-02-07 LAB — BACTERIA UR CULT: NO GROWTH

## 2020-02-07 NOTE — PLAN OF CARE
02/07/20 0736   Final Note   Assessment Type Final Discharge Note   Anticipated Discharge Disposition Home-Health   Right Care Referral Info   Post Acute Recommendation Home-care   Facility Name [facility]

## 2020-02-07 NOTE — DISCHARGE SUMMARY
[facility] Hospital Medicine  Discharge Summary      Patient Name: Kodi Ribeiro  MRN: [id number]  Admission Date: 1/31/2020  Hospital Length of Stay: 6 days  Discharge Date and Time:  02/06/2020 7:07 PM  Attending Physician: No att. providers found   Discharging Provider: Markell Magaña MD  Primary Care Provider: Norma Nuñez MD      HPI:    69 year old man with extensive past medical and surgery history of    stroke, spinal cord disease, peripheral vascular disease, MI (per patient, 2000 & 2012), lipoma of colon, infection of aortic graft, HTN, HLD, hyperglycemia, horseshoe kidney, hemorrhoids, GERD. He was recently discharged from the Hospital on  1/15/2020. He presented at this time with chest pain that started few hours prior to presentation.Pain was described a sharp ,non radiating ,not associated with diaphoresis and worsened with exertion. He called his wife and EMS was called.   Since admission, Lab data showed   Component      Latest Ref Rng & Units 1/31/2020 1/30/2020 1/15/2020               Hemoglobin      14.0 - 18.0 g/dL 6.9 (L) 7.0 (L) 7.7 (L)     Component      Latest Ref Rng & Units 1/31/2020 1/30/2020 1/15/2020               Creatinine      0.5 - 1.4 mg/dL 6.6 (H) 6.0 (H) 2.4 (H)   He also reported increased pain from the left foot ulceration and when the dressing was opened-sánchez pus was seen .  He will be admitted for symptomatic anemia and acute renal failure .    Procedure(s) (LRB):  CYSTOSCOPY, WITH URETERAL STENT INSERTION (Left)  CYSTOSCOPY, WITH URETERAL STENT REMOVAL (Left)      Hospital Course:   1/31-  Severe left sided hydronephrosis with ureteral stent visualized. In setting of acute renal failure concern with this being cause. This was discussed with Dr. Brennen Maldonado who feels that hydronephrosis is a chronic issue and not sole cause of acute renal failure. Patient already has stent and may need IR consult for nephrostomy tube placement. Will consult IR on case.  Attempted to contact patient's urologist earlier today Dr. Jin, left voicemail. Will dc asa and plavix due to pending procedure. Currently receiving 1 unit prbc. Will transfuse < 7.      Discussed case with Dr. Jin. Concerned with outlet obstruction. Recommended getting bladder scan which was negative. Recommendations were to workup other causes of renal failure as left sided hydronephrosis alone should not cause acute renal failure with the right kidney working.      Started sterile water with 150meq bicarb this am. Cont to monitor urine output. Fena calculated was 2.2% indicating intrinsic renal disease.      Case was discussed with IR earlier. Concerned with bleeding due to asa and plavix use. Does not recommend nephrostomy tube at this time due to risks. If any procedure needs to be done, recommended ureteral stent changed instead as it is less invasive.     2/1  Pt has no new C/O today . SOB is at baseline . On O2 at 2L/min. BNP is elevated noted .  Continued on gentle hydration with Bicarb . Serum creatinine is slightly better 6.6>5.3   Off of diuretics , ASA and Plavix  Possible ureteral stent change on Monday by  (Urologist)     S/P 1 unit of P-RBC yesterday  . Pt has H/O   chronic iron def anemia and is followed in the Hematology clinic on a regular basis . Baseline Hemoglobin ranges 8 to 9.   Bone scan suspicious for left foot osteomyelitis possibly chronic and does not appear an active issue at this time. Podiatry consult obtained and plan is noted   Will D/C broad spectrum antibiotic . Pt does have left foot ulceration and will place pt on Oral Doxycycline for 5 to  7 days.       2/2-  Last night event  noted. Pt was transferred to ICU overnight due to acute worsening of hypoxia required BiPAP therapy for oxygenation .  IVF discontinued . Pt had received Lasix 60 mg IV x 1 dose   Serum creatinine down to 4.2   Repeat U/S kidneys again showed Left sided hydronephrosis which is  unchanged from prior study on admit     CT chest without contrast revealed yary pleural effusion L>R, airspace disease involving yary lung bases , evidence of pulmonary fibrosis , subpleural bulla and centrilobular emphysema     2/3  SOB is better . Currently on O2 at 2L/min . Downgrade to Telemetry today   Urology consult obtained . CT abd /pelvis revealed interval worsening appearance of chronic severe left-sided hydronephrosis, and progression of severe atrophy of the left renal cortex. Left-sided double-J ureteral stent in satisfactory position. Per Urology left ureteral stent exchange tomorrow to lower risk of  pyelonephritis and to preserve remaining left kidney function.     Serum creatinine is trending down suggestive of renal function recovery . 42> 3.6   Urine output is satisfactory     Placed back on gentle diuresis due to cardiac decompensation   CT chest c/w emphysema. Continue bronchodilator treatment     2/4    Pt underwent Cystoscopy  with placement of left ureteral stent today without immediate complication   Post procedure pt developed SOB with increased O2 demand . Initially placed on biPAP and later wean to NC at 5 L/min  Additional lasix 20 mg IV x 1 dose given . Pt appears anxious and anxiolytics prescribed.     Serum creatinine improved since yesterday . 2.5 today . 6.6> 3.6>2.5  H/H are stable after 1 unit of P-RBC transfusion since admission     2/5  Remains on O2 . Currently 5L/min via NC   Noted episodes of chest pain (  substernal , 7/10 intensity , relived with NTG) and Fever of 100.7 last night and again today at noon . S/P left ureteral stent exchange yesterday   EKG revealed NSR and nonspecific  T wave changed . Troponin 0.013> 0.26  Known H/O CAD.   V/Q scan done this morning -  Very low probability of pul embolism     Plan- Resume ASA, Plavix . Resume Isosorbide mononitrate            Get Blood cultures , UA, Urine culture            D/C Doxycycline ,start empiric Levofloxacin      2/6  No further fever spike noted .  Other vitals are stable   Serum creatinine remains stable at 2.2   Lasix transitioned to oral . Pt was evaluated for Home O2 and qualified   Lasix dose adjusted to 40 mg M/W/F and 20 mg other days per week   Home med Losartan discontinued and pt started on Hydralazine along with Imdur   No further antimicrobials offered.     At this point pt pt is deemed medically stable to discharge home. He is set up with Home Health for Home O2 , wound care , nursing , and PT/OT .  Pt is advised to follow up with PCP in 3 to 7 days.      Consults:   Consults (From admission, onward)        Status Ordering Provider     Inpatient consult to Interventional Radiology  Once     Provider:  (Not yet assigned)    Completed CELSO CASILLAS     Inpatient consult to Pulmonology  Once     Provider:  Jostin Arzate MD    Completed GAUTAM KWON     Inpatient consult to Social Work  Once     Provider:  (Not yet assigned)    Completed JILL ROCHA     Inpatient consult to Urology  Once     Provider:  (Not yet assigned)    Completed CELSO CASILLAS          No new Assessment & Plan notes have been filed under this hospital service since the last note was generated.  Service: Hospital Medicine    Final Active Diagnoses:    Diagnosis Date Noted POA    Hydronephrosis of left kidney [N13.30] 01/31/2020 Yes    Acute respiratory failure with hypoxia [J96.01] 02/02/2020 Yes    COPD exacerbation [J44.1] 02/06/2020 No    Chest pain, midsternal, H/O known CAD  [R07.89] 02/05/2020 No    CKD (chronic kidney disease) stage 3, GFR 30-59 ml/min [N18.3] 09/04/2014 Yes     Chronic    PVD (peripheral vascular disease) [I73.9] 11/25/2013 Yes     Chronic    Foot ulcer - Left Foot [L97.509] 06/28/2013 Yes    Essential hypertension [I10] 06/18/2013 Yes     Chronic    Ureteral stent retained [Z96.0]  Not Applicable     Chronic      Problems Resolved During this Admission:    Diagnosis Date Noted Date Resolved  "POA    PRINCIPAL PROBLEM:  Acute renal failure superimposed on chronic kidney disease [N17.9, N18.9] 03/27/2019 02/06/2020 Yes    Acute on chronic diastolic heart failure [I50.33] 01/13/2020 02/06/2020 No    Acute on chronic anemia [D64.9] 01/31/2020 02/06/2020 Yes       Discharged Condition: stable    Disposition: Home-Health Care [facility]    Follow Up:  Follow-up Information     [facility].    Specialty:  Home Health Services  Why:  Home Health  Contact information:  [address]  [facility] [address]  [phone]             Norma Nuñez MD. Schedule an appointment as soon as possible for a visit in 3 days.    Specialty:  Family Medicine  Why:  Discharge follow up   Contact information:  [address] [address] [id number] [phone]             Jostin Arzate MD. Schedule an appointment as soon as possible for a visit in 1 week.    Specialty:  Pulmonary Disease  Why:  Follow up on COPD and Need eval for Sleep study   Contact information:  [address]  [place] [id number] [phone]                 Patient Instructions:      OXYGEN FOR HOME USE     Order Specific Question Answer Comments   Liter Flow 3    Duration Continuous    Qualifying SpO2: 87% on RA    Testing done at: Rest    Route nasal cannula    Device home concentrator with portable unit    Length of need (in months): 99 mos    Patient condition with qualifying saturation CHF    Height: 6' 1" (1.854 m)    Weight: 78.3 kg (172 lb 9.9 oz)    Does patient have medical equipment at home? wheelchair    Alternative treatment measures have been tried or considered and deemed clinically ineffective. Yes      NEBULIZER FOR HOME USE     Order Specific Question Answer Comments   Height: 6' 1" (1.854 m)    Weight: 78.3 kg (172 lb 9.9 oz)    Does patient have medical equipment at home? wheelchair    Length of need (1-99 months): 99    Vendor: [facility] approved    Expected Date of " Delivery: 2/6/2020      Ambulatory referral/consult to Home Health   Standing Status: Future   Referral Priority: Routine Referral Type: Home Health   Referral Reason: Specialty Services Required   Requested Specialty: Home Health Services   Number of Visits Requested: 1     Diet Cardiac   Order Comments: Salt restriction 2 gram/day   Fluid restriction 1.5 Liter /day     Activity as tolerated       Significant Diagnostic Studies: Labs:   BMP:   Recent Labs   Lab 02/05/20 0419 02/06/20  0406   GLU 96 107   * 134*   K 4.5 4.6    103   CO2 22* 23   BUN 24* 25*   CREATININE 2.2* 2.2*   CALCIUM 8.2* 8.6*   MG 1.4* 1.4*   , CMP   Recent Labs   Lab 02/05/20  0419 02/06/20  0406   * 134*   K 4.5 4.6    103   CO2 22* 23   GLU 96 107   BUN 24* 25*   CREATININE 2.2* 2.2*   CALCIUM 8.2* 8.6*   ANIONGAP 9 8   ESTGFRAFRICA 34* 34*   EGFRNONAA 29* 29*    and CBC   Recent Labs   Lab 02/05/20 0419 02/06/20  0406   WBC 3.80* 3.61*   HGB 8.0* 7.5*   HCT 26.6* 25.6*    159       Pending Diagnostic Studies:     None         Medications:  Reconciled Home Medications:      Medication List      START taking these medications    albuterol-ipratropium 2.5 mg-0.5 mg/3 mL nebulizer solution  Commonly known as:  DUO-NEB  Take 3 mLs by nebulization every 8 (eight) hours as needed for Wheezing or Shortness of Breath. Rescue     ferrous gluconate 324 MG tablet  Commonly known as:  FERGON  Take 1 tablet (324 mg total) by mouth 2 (two) times daily with meals.     hydrALAZINE 25 MG tablet  Commonly known as:  APRESOLINE  Take 1 tablet (25 mg total) by mouth every 12 (twelve) hours.     Niva-Fol 2.5-25-2 mg Tab  Generic drug:  folic acid-vit B6-vit B12 2.5-25-2 mg  Take 1 tablet by mouth once daily.  Start taking on:  February 7, 2020        CHANGE how you take these medications    furosemide 20 MG tablet  Commonly known as:  LASIX  Take two tab on Mon/Wed/Friday and one tab on Tues/Thurs/ Sat/Sunday  What changed:     · how much to take  · how to take this  · when to take this  · additional instructions        CONTINUE taking these medications    amLODIPine 10 MG tablet  Commonly known as:  NORVASC  TAKE 1 TABLET EVERY DAY     aspirin 81 MG EC tablet  Commonly known as:  ECOTRIN  Take 1 tablet (81 mg total) by mouth once daily.     carvediloL 25 MG tablet  Commonly known as:  COREG  Take 1 tablet (25 mg total) by mouth 2 (two) times daily with meals.     cetirizine 10 MG tablet  Commonly known as:  ZYRTEC  Take 10 mg by mouth once daily.     clopidogreL 75 mg tablet  Commonly known as:  PLAVIX  TAKE 1 TABLET EVERY DAY     isosorbide mononitrate 60 MG 24 hr tablet  Commonly known as:  IMDUR  Take 2 tablets (120 mg total) by mouth once daily.     mupirocin 2 % ointment  Commonly known as:  BACTROBAN  Apply topically 3 (three) times daily.     nitroglycerin 0.6 MG Subl  Commonly known as:  NITROSTAT  Place 1 tablet (0.6 mg total) under the tongue every 5 (five) minutes as needed (max 3/ per episode).     omeprazole 20 MG capsule  Commonly known as:  PRILOSEC  TAKE 1 CAPSULE TWICE DAILY        STOP taking these medications    losartan 100 MG tablet  Commonly known as:  COZAAR            Indwelling Lines/Drains at time of discharge:   Lines/Drains/Airways     None                 Time spent on the discharge of patient: 40  minutes  Patient was seen and examined on the date of discharge and determined to be suitable for discharge.         Markell Magaña MD  Department of Hospital Medicine  [facility]

## 2020-02-10 ENCOUNTER — TELEPHONE (OUTPATIENT)
Dept: PULMONOLOGY | Facility: CLINIC | Age: 70
End: 2020-02-10

## 2020-02-10 ENCOUNTER — TELEPHONE (OUTPATIENT)
Dept: FAMILY MEDICINE | Facility: CLINIC | Age: 70
End: 2020-02-10

## 2020-02-10 ENCOUNTER — PATIENT OUTREACH (OUTPATIENT)
Dept: ADMINISTRATIVE | Facility: CLINIC | Age: 70
End: 2020-02-10

## 2020-02-10 DIAGNOSIS — N13.30 HYDRONEPHROSIS OF LEFT KIDNEY: Primary | ICD-10-CM

## 2020-02-10 NOTE — TELEPHONE ENCOUNTER
----- Message from Yuridia Lipscomb sent at 2/10/2020  2:23 PM CST -----  Contact: pt wife  Type:  Patient Returning Call    Who Called: pt wife  Who Left Message for Patient:Dr Nuñez office  Does the patient know what this is regarding?:not sure   Would the patient rather a call back or a response via [facility]? Call back  Best Call Back Number: [phone]  Additional Information:

## 2020-02-10 NOTE — TELEPHONE ENCOUNTER
----- Message from Jostin Arzate MD sent at 2/10/2020  1:03 PM CST -----  Need to see for CMN documentation  We signed off prior to Discharge  Office visit: can see Nathaly or baldo Arzate MD    ----- Message -----  From: Matilda Frankel MA  Sent: 2/10/2020  12:58 PM CST  To: Jostin Arzate MD      ----- Message -----  From: Yue Jj RN  Sent: 2/10/2020  11:33 AM CST  To: Huey Frankel Staff    Good morning,   I just completed a post discharge follow-up call with the wife of Kodi Ribeiro.  She is requestwng that an order for a portable travel oxygen concentrator be sent to [facility].  Is this something that Dr Vigil can do for him? 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 .  Thanks so much  Yue Jj RN  [facility]  [facility]

## 2020-02-10 NOTE — PATIENT INSTRUCTIONS
Ureteral Stents  A ureteral stent is a soft plastic tube with holes in it. Its temporarily inserted into a ureter to help drain urine into the bladder. One end goes in the kidney. The other end goes in the bladder. A coil on each end holds the stent in place. The stent cant be seen from outside the body. It shouldnt interfere with your normal routine. Your stent will be put in by a doctor trained in treating the urinary tract (a urologist) or another specialist. The procedure is done in a hospital or surgery center. Youll likely go home the same day.  When is a ureteral stent used?  A ureteral stent may be used:  · To bypass a blockage in a kidney or ureter.  · During kidney stone removal.  · To let a ureter heal after surgery.    Before the Procedure  Your healthcare provider will give you instructions to prepare for the procedure. X-rays or other imaging tests of your kidneys and ureters may be done beforehand.  During the procedure  · You receive medicine to prevent pain and help you relax or sleep during the procedure. Once this takes effect, the procedure starts.  · The doctor inserts a cystoscope (lighted instrument) through the urethra and into the bladder. This shows the opening to the ureter.  · A thin wire is carefully threaded through the cystoscope, up the ureter, and into the kidney. The stent is inserted over the wire.  · A fluoroscope (special X-ray machine) is used to help position the stent. When the stent is in place, the wire and cystoscope are removed.  While you have a stent  · Some discomfort is normal. Certain movements may trigger pain or a feeling that you need to urinate. You may also feel mild soreness or pressure before or during urination. These symptoms will go away a few days after the stent is removed.  · Medicine to control pain or bladder spasms or to prevent infection may be prescribed. Take this as directed.  · Drink plenty of fluids to help flush out your urinary  tract.  · Your urine may be slightly pink or red. This is due to bleeding caused by minor irritation from the stent. This may happen on and off while you have the stent.  · As with any synthetic device placed in the body, there is a risk of infection. The stent may have to be removed if this happens.   How long will you need a stent?  The stent is often taken out after the blockage in the ureter is treated or the ureter has healed. This may take 1 week to 2 weeks, or longer. If a stent is needed for a long time, it may need to be changed every few months.  When to call your healthcare provider  Contact your healthcare provider right away if:  · Your urine contains blood clots or you see a large amount of blood-tinged urine  · You have symptoms similar to those you had before the stent was placed  · You constantly leak urine  · You have a fever over 100.4°F (38°C), chills, nausea, or vomiting  · Your pain is not relieved with medicine  · The end of the stent comes out of the urethra   Date Last Reviewed: 1/1/2017  © 5739-7618 [employer] [address] [place] [address]. All rights reserved. This information is not intended as a substitute for professional medical care. Always follow your healthcare professional's instructions.

## 2020-02-10 NOTE — TELEPHONE ENCOUNTER
----- Message from Carla Helms sent at 2/10/2020  3:04 PM CST -----  Contact: wife - [phone]  .Type:  Patient Returning Call    Who Called:Wife   Who Left Message for Patient:Unsure  Does the patient know what this is regarding?:Unsure   Would the patient rather a call back or a response via [facility]? Call back   Best Call Back Number:.[phone] (home)   Additional Information:       Thank You,   Carla Helms

## 2020-02-11 ENCOUNTER — TELEPHONE (OUTPATIENT)
Dept: PULMONOLOGY | Facility: CLINIC | Age: 70
End: 2020-02-11

## 2020-02-11 LAB
BACTERIA BLD CULT: NORMAL
BACTERIA BLD CULT: NORMAL

## 2020-02-11 NOTE — TELEPHONE ENCOUNTER
Called patient to reschedule appt Mr Ribeiro was'nt  avaiable to speak with me . So Mrs Ribeiro states patient would like to see Dr Javier only.

## 2020-02-13 ENCOUNTER — CARE AT HOME (OUTPATIENT)
Dept: HOME HEALTH SERVICES | Facility: CLINIC | Age: 70
End: 2020-02-13
Payer: MEDICARE

## 2020-02-13 VITALS
DIASTOLIC BLOOD PRESSURE: 78 MMHG | SYSTOLIC BLOOD PRESSURE: 126 MMHG | HEART RATE: 69 BPM | OXYGEN SATURATION: 98 % | RESPIRATION RATE: 22 BRPM | TEMPERATURE: 98 F

## 2020-02-13 DIAGNOSIS — R07.89 ATYPICAL CHEST PAIN: ICD-10-CM

## 2020-02-13 DIAGNOSIS — J96.01 ACUTE RESPIRATORY FAILURE WITH HYPOXIA: ICD-10-CM

## 2020-02-13 DIAGNOSIS — I10 ESSENTIAL HYPERTENSION: Chronic | ICD-10-CM

## 2020-02-13 DIAGNOSIS — Z96.0 URETERAL STENT RETAINED: Chronic | ICD-10-CM

## 2020-02-13 DIAGNOSIS — N13.30 HYDRONEPHROSIS OF LEFT KIDNEY: ICD-10-CM

## 2020-02-13 DIAGNOSIS — Z09 FOLLOW UP: Primary | ICD-10-CM

## 2020-02-13 DIAGNOSIS — D50.0 IRON DEFICIENCY ANEMIA DUE TO CHRONIC BLOOD LOSS: ICD-10-CM

## 2020-02-13 DIAGNOSIS — D64.9 ANEMIA, UNSPECIFIED TYPE: ICD-10-CM

## 2020-02-13 DIAGNOSIS — J44.1 COPD EXACERBATION: ICD-10-CM

## 2020-02-13 DIAGNOSIS — F34.1 DYSTHYMIA: ICD-10-CM

## 2020-02-13 PROCEDURE — 99499 UNLISTED E&M SERVICE: CPT | Mod: S$GLB,,, | Performed by: NURSE PRACTITIONER

## 2020-02-13 PROCEDURE — 99499 RISK ADDL DX/OHS AUDIT: ICD-10-PCS | Mod: S$GLB,,, | Performed by: NURSE PRACTITIONER

## 2020-02-13 PROCEDURE — 99496 TRANSITIONAL CARE MANAGE SERVICE 7 DAY DISCHARGE: ICD-10-PCS | Mod: S$GLB,,, | Performed by: NURSE PRACTITIONER

## 2020-02-13 PROCEDURE — 99496 TRANSJ CARE MGMT HIGH F2F 7D: CPT | Mod: S$GLB,,, | Performed by: NURSE PRACTITIONER

## 2020-02-13 RX ORDER — ISOSORBIDE MONONITRATE 60 MG/1
120 TABLET, EXTENDED RELEASE ORAL DAILY
Refills: 11
Start: 2020-02-13 | End: 2020-02-28 | Stop reason: SDUPTHER

## 2020-02-13 RX ORDER — SERTRALINE HYDROCHLORIDE 50 MG/1
50 TABLET, FILM COATED ORAL DAILY
COMMUNITY
End: 2020-02-13 | Stop reason: SDUPTHER

## 2020-02-13 RX ORDER — ATORVASTATIN CALCIUM 40 MG/1
40 TABLET, FILM COATED ORAL DAILY
Qty: 90 TABLET | Refills: 3
Start: 2020-02-13 | End: 2020-02-28 | Stop reason: SDUPTHER

## 2020-02-13 RX ORDER — SERTRALINE HYDROCHLORIDE 50 MG/1
50 TABLET, FILM COATED ORAL DAILY
Qty: 90 TABLET | Refills: 11
Start: 2020-02-13 | End: 2021-01-01 | Stop reason: CLARIF

## 2020-02-13 NOTE — PROGRESS NOTES
[facility]  Transition of Care Home Visit    Visit Date: 2/13/2020  Encounter Provider: Porter Simon NP  PCP:  Norma Nuñez MD    PRESENTING HISTORY      Patient ID: Kodi Ribeiro 70 y.o. male.    Consult Requested By:  Dr. Norma Nuñez  Reason for Consult:  Transitional Care Coordination    Chief Complaint: Transitional Care     History of Present Illness:  69 year old man with extensive past medical and surgery history of    stroke, spinal cord disease, peripheral vascular disease, MI (per patient, 2000 & 2012), lipoma of colon, infection of aortic graft, HTN, HLD, hyperglycemia, horseshoe kidney, hemorrhoids, GERD. He was recently discharged from the Hospital on  1/15/2020. He presented at this time with chest pain that started few hours prior to presentation.Pain was described a sharp ,non radiating ,not associated with diaphoresis and worsened with exertion. He called his wife and EMS was called.     Hospital Course:   1/31-  Severe left sided hydronephrosis with ureteral stent visualized. In setting of acute renal failure concern with this being cause. This was discussed with Dr. Brennen Maldonado who feels that hydronephrosis is a chronic issue and not sole cause of acute renal failure. Patient already has stent and may need IR consult for nephrostomy tube placement. Will consult IR on case. Attempted to contact patient's urologist earlier today Dr. Jin, left voicemail. Will dc asa and plavix due to pending procedure. Currently receiving 1 unit prbc. Will transfuse < 7.      Discussed case with Dr. Jin. Concerned with outlet obstruction. Recommended getting bladder scan which was negative. Recommendations were to workup other causes of renal failure as left sided hydronephrosis alone should not cause acute renal failure with the right kidney working.      Started sterile water with 150meq bicarb this am. Cont to monitor urine output. Fena calculated was 2.2% indicating  intrinsic renal disease.      Case was discussed with IR earlier. Concerned with bleeding due to asa and plavix use. Does not recommend nephrostomy tube at this time due to risks. If any procedure needs to be done, recommended ureteral stent changed instead as it is less invasive.      2/1  Pt has no new C/O today . SOB is at baseline . On O2 at 2L/min. BNP is elevated noted .  Continued on gentle hydration with Bicarb . Serum creatinine is slightly better 6.6>5.3   Off of diuretics , ASA and Plavix  Possible ureteral stent change on Monday by  (Urologist)      S/P 1 unit of P-RBC yesterday  . Pt has H/O   chronic iron def anemia and is followed in the Hematology clinic on a regular basis . Baseline Hemoglobin ranges 8 to 9.   Bone scan suspicious for left foot osteomyelitis possibly chronic and does not appear an active issue at this time. Podiatry consult obtained and plan is noted   Will D/C broad spectrum antibiotic . Pt does have left foot ulceration and will place pt on Oral Doxycycline for 5 to  7 days.        2/2-  Last night event  noted. Pt was transferred to ICU overnight due to acute worsening of hypoxia required BiPAP therapy for oxygenation .  IVF discontinued . Pt had received Lasix 60 mg IV x 1 dose   Serum creatinine down to 4.2   Repeat U/S kidneys again showed Left sided hydronephrosis which is unchanged from prior study on admit      CT chest without contrast revealed yary pleural effusion L>R, airspace disease involving yary lung bases , evidence of pulmonary fibrosis , subpleural bulla and centrilobular emphysema      2/3  SOB is better . Currently on O2 at 2L/min . Downgrade to Telemetry today   Urology consult obtained . CT abd /pelvis revealed interval worsening appearance of chronic severe left-sided hydronephrosis, and progression of severe atrophy of the left renal cortex. Left-sided double-J ureteral stent in satisfactory position. Per Urology left ureteral stent exchange  tomorrow to lower risk of  pyelonephritis and to preserve remaining left kidney function.      Serum creatinine is trending down suggestive of renal function recovery . 42> 3.6   Urine output is satisfactory      Placed back on gentle diuresis due to cardiac decompensation   CT chest c/w emphysema. Continue bronchodilator treatment      2/4     Pt underwent Cystoscopy  with placement of left ureteral stent today without immediate complication   Post procedure pt developed SOB with increased O2 demand . Initially placed on biPAP and later wean to NC at 5 L/min  Additional lasix 20 mg IV x 1 dose given . Pt appears anxious and anxiolytics prescribed.      Serum creatinine improved since yesterday . 2.5 today . 6.6> 3.6>2.5  H/H are stable after 1 unit of P-RBC transfusion since admission      2/5  Remains on O2 . Currently 5L/min via NC   Noted episodes of chest pain (  substernal , 7/10 intensity , relived with NTG) and Fever of 100.7 last night and again today at noon . S/P left ureteral stent exchange yesterday   EKG revealed NSR and nonspecific  T wave changed . Troponin 0.013> 0.26  Known H/O CAD.   V/Q scan done this morning -  Very low probability of pul embolism      Plan- Resume ASA, Plavix . Resume Isosorbide mononitrate            Get Blood cultures , UA, Urine culture            D/C Doxycycline ,start empiric Levofloxacin      2/6  No further fever spike noted .  Other vitals are stable   Serum creatinine remains stable at 2.2   Lasix transitioned to oral . Pt was evaluated for Home O2 and qualified   Lasix dose adjusted to 40 mg M/W/F and 20 mg other days per week   Home med Losartan discontinued and pt started on Hydralazine along with Imdur   No further antimicrobials offered.     Admit Date: 01/31/20  Discharge Date: 02/06/20  TCC Referral Date: 02/10/20  _________________________________________________________________    Today:  Mr. Kodi Ribiero is a 70 y.o. male is being seen at home today for  transitional care visit to the home environment post-discharge from inpatient hospitalization encounter described above.    Review of Systems   Constitutional: Negative for appetite change, chills, diaphoresis, fatigue and fever.   HENT: Negative for congestion, facial swelling, hearing loss, sneezing, sore throat, tinnitus, trouble swallowing and voice change.    Eyes: Negative for photophobia, discharge, redness and visual disturbance.   Respiratory: Positive for shortness of breath. Negative for apnea and cough.         Occasional with exertion   Cardiovascular: Negative for chest pain, palpitations and leg swelling.   Gastrointestinal: Negative for abdominal distention, abdominal pain, blood in stool, constipation, diarrhea, nausea and vomiting.   Endocrine: Negative for cold intolerance, heat intolerance, polydipsia, polyphagia and polyuria.   Genitourinary: Negative for difficulty urinating, dysuria, flank pain, frequency, hematuria and urgency.   Musculoskeletal: Negative for arthralgias, gait problem, joint swelling and myalgias.   Skin: Negative for color change, pallor, rash and wound.   Allergic/Immunologic: Negative for environmental allergies and immunocompromised state.   Neurological: Negative for dizziness, tremors, seizures, syncope, weakness and numbness.   Hematological: Negative for adenopathy. Does not bruise/bleed easily.   Psychiatric/Behavioral: Positive for dysphoric mood. Negative for behavioral problems, confusion, hallucinations, self-injury and suicidal ideas.        Restarted sertraline per self due to recent concerns over his health issues.   All other systems reviewed and are negative.    Assessments:  · Environmental: single story house, no steps to enter, adequate lighting and climate control  · Functional Status: Independently Wheelchair Bound around house, Assistance to leave home, continent  · Safety: Fall Precautions, oxygen dependent  · Nutritional: adequate  · Home Health:  [facility]    · DME/Supplies: Wheelchair, Oxygen Conc, tanks, nebulizer, tub bench    PAST HISTORY:     Past Medical History:   Diagnosis Date    Acute on chronic congestive heart failure 1/13/2020    Acute respiratory failure with hypoxia 1/14/2020    Analgesic nephropathy     Anemia     AP (angina pectoris) 1/11/2019    Arthritis     Colon polyp     Repeat colonoscopy due in 9/14    COPD exacerbation 2/6/2020    Coronary artery disease     Diverticulosis     colonoscopy 2/21/2014    Encounter for blood transfusion     GERD (gastroesophageal reflux disease)     Hemorrhoids     colonoscopy 2/21/2014    Horseshoe kidney     Hyperglycemia 3/17/2014    Hyperlipidemia     Hypertension     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    Late complications of amputation stump     rseolved with further amputation( MRSA then none since 2014)    Lipoma of colon     colonoscopy 2/21/2014    Myocardial infarction     per patient 2000 & 9/2012    Peripheral vascular disease     Phantom limb syndrome     patient reports only intermittent not problematic, not worsening    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    Spinal cord disease     L4L5 disc    Stroke     Tobacco dependence     resolved    Ureteral stent retained        Past Surgical History:   Procedure Laterality Date    ABDOMINAL AORTIC ANEURYSM REPAIR      ABDOMINAL AORTIC ANEURYSM REPAIR  1996/2014    AMPUTATION, LOWER LIMB      AORTA - BILATERAL FEMORAL ARTERY BYPASS GRAFT  2014    Left and right leg    CORONARY ANGIOPLASTY WITH STENT PLACEMENT  2000    Three placed in heart    CYSTOSCOPY W/ RETROGRADES Left 5/29/2018    Procedure: CYSTOSCOPY, WITH RETROGRADE PYELOGRAM;  Surgeon: Scooter Jin IV, MD;  Location: [facility] OR;  Service: Urology;  Laterality: Left;    CYSTOSCOPY W/ URETERAL STENT PLACEMENT Left 5/29/2018    Procedure: CYSTOSCOPY, WITH URETERAL STENT INSERTION;  Surgeon: Scooter Jin IV, MD;  Location: [facility] [place] OR;  Service:  Urology;  Laterality: Left;    CYSTOSCOPY W/ URETERAL STENT PLACEMENT Left 2/4/2020    Procedure: CYSTOSCOPY, WITH URETERAL STENT INSERTION;  Surgeon: Scooter Jin IV, MD;  Location: [facility] OR;  Service: Urology;  Laterality: Left;    CYSTOSCOPY W/ URETERAL STENT REMOVAL Left 5/29/2018    Procedure: CYSTOSCOPY, WITH URETERAL STENT REMOVAL;  Surgeon: Scooter Jin IV, MD;  Location: [facility] OR;  Service: Urology;  Laterality: Left;    CYSTOSCOPY W/ URETERAL STENT REMOVAL Left 2/4/2020    Procedure: CYSTOSCOPY, WITH URETERAL STENT REMOVAL;  Surgeon: Scooter Jin IV, MD;  Location: [facility] OR;  Service: Urology;  Laterality: Left;    FOOT AMPUTATION THROUGH METATARSAL  1996    left    FOOT SURGERY Bilateral 1980's    per patient multiple toe amputations prior to.  partial foot amputation:first great toe then other toes     KIDNEY SURGERY  2014    per patient separation of horseshoe kidney @ time of AAA repair    LEFT HEART CATHETERIZATION Left 3/7/2019    Procedure: CATHETERIZATION, HEART, LEFT;  Surgeon: Adriel Boone MD;  Location: [facility] CATH LAB;  Service: Cardiology;  Laterality: Left;  630 admit for IV hydration  10am start    LUNG LOBECTOMY Right 1970s    per patient not cancer    right below knee amputation  2009 (approx)    SMALL INTESTINE SURGERY  2014    per patient partial @ time of aaa repair  not small bowel - large bowel bowel compromised bythtwe AAAbowel    TONSILLECTOMY  1955 aprox    URETERAL STENT PLACEMENT Left     annually replaced since 2012 or so  Dr Jin       Family History   Problem Relation Age of Onset    Cancer Mother         lung    COPD Mother     Heart disease Father         MI but per patient bc of old age    Diabetes Daughter     Eczema Neg Hx     Lupus Neg Hx     Psoriasis Neg Hx     Melanoma Neg Hx     Kidney disease Neg Hx     Stroke Neg Hx     Mental illness Neg Hx     Mental retardation Neg Hx     Hypertension Neg Hx     Hyperlipidemia Neg Hx      Drug abuse Neg Hx     Alcohol abuse Neg Hx     Depression Neg Hx        Social History     Socioeconomic History    Marital status:      Spouse name: Laury    Number of children: 2    Years of education: Not on file    Highest education level: Not on file   Occupational History    Occupation: Retired      Comment: [employer]   Social Needs    Financial resource strain: Not on file    Food insecurity:     Worry: Not on file     Inability: Not on file    Transportation needs:     Medical: Not on file     Non-medical: Not on file   Tobacco Use    Smoking status: Former Smoker     Packs/day: 1.00     Years: 15.00     Pack years: 15.00     Last attempt to quit: 2009     Years since quittin.1    Smokeless tobacco: Never Used   Substance and Sexual Activity    Alcohol use: No    Drug use: No     Comment: Is on prescription opiod, no non prescribed use    Sexual activity: Not Currently     Partners: Female   Lifestyle    Physical activity:     Days per week: Not on file     Minutes per session: Not on file    Stress: Not on file   Relationships    Social connections:     Talks on phone: Not on file     Gets together: Not on file     Attends Voodoo service: Not on file     Active member of club or organization: Not on file     Attends meetings of clubs or organizations: Not on file     Relationship status: Not on file   Other Topics Concern    Not on file   Social History Narrative     . 4 children alive and well. 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  Still drives. Does not have a Living Will.       MEDICATIONS & ALLERGIES:     Current Outpatient Medications on File Prior to Visit   Medication Sig Dispense Refill    albuterol-ipratropium (DUO-NEB) 2.5 mg-0.5 mg/3 mL nebulizer solution Take 3 mLs by nebulization every 8 (eight) hours as needed for Wheezing or Shortness of Breath. Rescue 90 mL 0    amLODIPine  (NORVASC) 10 MG tablet TAKE 1 TABLET EVERY DAY 90 tablet 3    aspirin (ECOTRIN) 81 MG EC tablet Take 1 tablet (81 mg total) by mouth once daily.  0    carvedilol (COREG) 25 MG tablet Take 1 tablet (25 mg total) by mouth 2 (two) times daily with meals. 60 tablet 11    cetirizine (ZYRTEC) 10 MG tablet Take 10 mg by mouth once daily.      clopidogrel (PLAVIX) 75 mg tablet TAKE 1 TABLET EVERY DAY 90 tablet 3    ferrous gluconate (FERGON) 324 MG tablet Take 1 tablet (324 mg total) by mouth 2 (two) times daily with meals. 60 tablet 0    folic acid-vit B6-vit B12 2.5-25-2 mg (FOLBIC OR EQUIV) 2.5-25-2 mg Tab Take 1 tablet by mouth once daily. 30 tablet 0    furosemide (LASIX) 20 MG tablet Take two tab on Mon/Wed/Friday and one tab on Tues/Thurs/ Sat/Sunday 40 tablet 0    hydrALAZINE (APRESOLINE) 25 MG tablet Take 1 tablet (25 mg total) by mouth every 12 (twelve) hours. 60 tablet 0    mupirocin (BACTROBAN) 2 % ointment Apply topically 3 (three) times daily. 1 Tube 2    nitroglycerin (NITROSTAT) 0.6 MG Subl Place 1 tablet (0.6 mg total) under the tongue every 5 (five) minutes as needed (max 3/ per episode). 30 tablet 1    omeprazole (PRILOSEC) 20 MG capsule TAKE 1 CAPSULE TWICE DAILY 180 capsule 3    OXYGEN-AIR DELIVERY SYSTEMS MISC by [facility].(Non-Drug; Combo Route) route.      [DISCONTINUED] sertraline (ZOLOFT) 50 MG tablet Take 50 mg by mouth once daily.      [DISCONTINUED] isosorbide mononitrate (IMDUR) 60 MG 24 hr tablet Take 2 tablets (120 mg total) by mouth once daily. 60 tablet 11     No current facility-administered medications on file prior to visit.         Review of patient's allergies indicates:   Allergen Reactions    Morphine Itching       OBJECTIVE:     Vital Signs:  Vitals:    02/13/20 1200   BP: 126/78   Pulse: 69   Resp: (!) 22   Temp: 97.9 °F (36.6 °C)     There is no height or weight on file to calculate BMI.     Physical Exam   Constitutional: He is oriented to person, place, and time. Vital  signs are normal. He appears well-developed and well-nourished. He is cooperative. He is easily aroused.   HENT:   Head: Normocephalic and atraumatic.   Mouth/Throat: Oropharynx is clear and moist.   Eyes: Pupils are equal, round, and reactive to light. Conjunctivae, EOM and lids are normal.   Neck: Trachea normal, normal range of motion and full passive range of motion without pain. Neck supple. No JVD present. No tracheal deviation present. No thyromegaly present.   Cardiovascular: Normal rate, regular rhythm, S1 normal, S2 normal, normal heart sounds and normal pulses. Exam reveals no gallop and no friction rub.   No murmur heard.  Pulses:       Radial pulses are 2+ on the right side, and 2+ on the left side.        Dorsalis pedis pulses are 2+ on the right side, and 2+ on the left side.   Pulmonary/Chest: Effort normal. No respiratory distress. He has no wheezes. He has no rales.   No accessory muscle use, diminished BS to Bilateral bases, Oxygen viz N/C @ 3L/min continuously,   Abdominal: Soft. Bowel sounds are normal. He exhibits no distension and no mass. There is no tenderness. There is no rebound and no guarding. No hernia.   Genitourinary:   Genitourinary Comments: Deferred   Musculoskeletal: Normal range of motion. He exhibits no tenderness or deformity.   Left distal foot amputee, right BKA   Neurological: He is alert, oriented to person, place, and time and easily aroused. He has normal strength. No sensory deficit. He exhibits normal muscle tone. GCS eye subscore is 4. GCS verbal subscore is 5. GCS motor subscore is 6.   Skin: Skin is warm and dry. Capillary refill takes less than 2 seconds. No rash noted. No erythema. No pallor.   Psychiatric: He has a normal mood and affect. His speech is normal and behavior is normal. Judgment and thought content normal. Cognition and memory are normal.   Nursing note and vitals reviewed.    Laboratory  Lab Results   Component Value Date    WBC 3.61 (L) 02/06/2020     HGB 7.5 (L) 02/06/2020    HCT 25.6 (L) 02/06/2020    MCV 91 02/06/2020     02/06/2020     Lab Results   Component Value Date    INR 1.1 03/27/2019    INR 1.0 02/26/2019    INR 1.0 01/23/2019     Lab Results   Component Value Date    HGBA1C 5.0 03/27/2019     No results for input(s): POCTGLUCOSE in the last 72 hours.      TRANSITION OF CARE:     Family and/or Caretaker present at visit?  Yes.  Diagnostic tests reviewed/disposition: Labs ordered for 02/26/20.  Disease/illness education: Med Compliance, Iron Supplementation r/t anemia Dx, resumption of sertraline per patient d/t recent health issues  Home health/community services discussion/referrals: Patient has home health established at [facility].   Establishment or re-establishment of referral orders for community resources: No other necessary community resources.   Discussion with other health care providers: Patient requests portable concentrator, PD RN PIERRE Jj contacted, message has already been sent to Dr. Arzate for consideration on next [facility] appointment schedulded for 02/18/20.     Transition of Care Visit:  I have reviewed and updated the history and problem list. I have reconciled the medication list. I have discussed the hospitalization and current medical issues, prognosis and plans with the patient/family.     I spent more than 50% of time discussing the care with the patient/family. Total Face-to-Face Encounter: 60 minutes.    Medications Reconciliation:   I have reconciled the patient's home medications and discharge medications with the patient/family. I have updated all changes. Refer to After-Visit Medication List.    Discharge plans, follow-up instructions, future appointments, provider contact information, indicators to seek emergency treatment and encouragement to call for any questions, concerns or clarification of the patient's plan of care explained to patient and/or caregiver(s), whom confirm understanding of provided  information and endorse willingness to comply.     ASSESSMENT & PLAN:     HIGH RISK CONDITION(S):  Patient has a condition that poses threat to life and bodily function: Severe Respiratory Distress    Kodi was seen today for transitional care.    Diagnoses and all orders for this visit:    Acute respiratory failure with hypoxia  COPD exacerbation  - maintain duo-nebs and oxygen at 3L/min per pulm recs    Hydronephrosis of left kidney  Ureteral stent retained  - monitor urinary symptoms, notify if symptoms recur    Iron deficiency anemia due to chronic blood loss  Anemia, unspecified type   - continue Fe supplementation    Essential hypertension   - Continue hydralazine   - continue coreg   - continue norvasc    Hyperlipidemia  -reinitiate atorvastatin per card recs.    Dysthymia, recurrent   - restart sertraline    Atypical chest pain  -  Continue NTG prn  -  Continue Imdur  - Continue plavix  - continue asa    Other orders  -     atorvastatin (LIPITOR) 40 MG tablet; Take 1 tablet (40 mg total) by mouth once daily.   - isosorbide mononitrate (IMDUR) 60 MG 24 hr tablet; Take 2 tablets (120 mg total) by mouth once daily. Rx sent to mail order pharmacy for 90 day supply  -     sertraline (ZOLOFT) 50 MG tablet; Take 1 tablet (50 mg total) by mouth once daily. Resumed by patient (historical Rx) secondary to recent health issue    Were controlled substances prescribed?  No    Instructions for the patient:    Scheduled Follow-up :  Future Appointments   Date Time Provider Department Center   2/18/2020 11:20 AM MD DEBORAH Brewster [facility]   2/26/2020  9:50 AM SPECIMEN, [facility] SPECLAB [facility]   2/26/2020 10:10 AM LABORATORY, [facility] LAB [facility]   2/27/2020  9:00 AM Porter Salinas NP [id number]   2/27/2020  2:00 PM Brentno Jacobson MD ON NEPHRO [facility]       After Visit Medication List :     Medication List           Accurate as of February 13, 2020  2:58  PM. If you have any questions, ask your nurse or doctor.               START taking these medications    atorvastatin 40 MG tablet  Commonly known as:  LIPITOR  Take 1 tablet (40 mg total) by mouth once daily.  Started by:  Porter Salinas NP        CONTINUE taking these medications    albuterol-ipratropium 2.5 mg-0.5 mg/3 mL nebulizer solution  Commonly known as:  DUO-NEB  Take 3 mLs by nebulization every 8 (eight) hours as needed for Wheezing or Shortness of Breath. Rescue     amLODIPine 10 MG tablet  Commonly known as:  NORVASC  TAKE 1 TABLET EVERY DAY     aspirin 81 MG EC tablet  Commonly known as:  ECOTRIN  Take 1 tablet (81 mg total) by mouth once daily.     carvediloL 25 MG tablet  Commonly known as:  COREG  Take 1 tablet (25 mg total) by mouth 2 (two) times daily with meals.     cetirizine 10 MG tablet  Commonly known as:  ZYRTEC     clopidogreL 75 mg tablet  Commonly known as:  PLAVIX  TAKE 1 TABLET EVERY DAY     ferrous gluconate 324 MG tablet  Commonly known as:  FERGON  Take 1 tablet (324 mg total) by mouth 2 (two) times daily with meals.     furosemide 20 MG tablet  Commonly known as:  LASIX  Take two tab on Mon/Wed/Friday and one tab on Tues/Thurs/ Sat/Sunday     hydrALAZINE 25 MG tablet  Commonly known as:  APRESOLINE  Take 1 tablet (25 mg total) by mouth every 12 (twelve) hours.     isosorbide mononitrate 60 MG 24 hr tablet  Commonly known as:  IMDUR  Take 2 tablets (120 mg total) by mouth once daily.     mupirocin 2 % ointment  Commonly known as:  BACTROBAN  Apply topically 3 (three) times daily.     nitroglycerin 0.6 MG Subl  Commonly known as:  NITROSTAT  Place 1 tablet (0.6 mg total) under the tongue every 5 (five) minutes as needed (max 3/ per episode).     Niva-Fol 2.5-25-2 mg Tab  Generic drug:  folic acid-vit B6-vit B12 2.5-25-2 mg  Take 1 tablet by mouth once daily.     omeprazole 20 MG capsule  Commonly known as:  PRILOSEC  TAKE 1 CAPSULE TWICE DAILY     OXYGEN-AIR DELIVERY SYSTEMS  MISC     sertraline 50 MG tablet  Commonly known as:  ZOLOFT  Take 1 tablet (50 mg total) by mouth once daily.           Where to Get Your Medications      Information about where to get these medications is not yet available        Patient consent was obtained prior to treatment on this visit.    Signature:      Porter Simon, MSN, APRN, FNP-C  [facility]

## 2020-02-13 NOTE — PATIENT INSTRUCTIONS
1. Nurse Practitioner to schedule follow-up visit with patient in 2 weeks  2. Continue all medications and treatment regimens as ordered.   3. For worsening symptoms: call Primary Care Physician or Nurse Practitioner.  4. For emergencies, call 911 or immediately report to the nearest emergency room.  5. Maintain Fall Precautions at all times.

## 2020-02-18 ENCOUNTER — OFFICE VISIT (OUTPATIENT)
Dept: PULMONOLOGY | Facility: CLINIC | Age: 70
End: 2020-02-18
Payer: MEDICARE

## 2020-02-18 ENCOUNTER — CLINICAL SUPPORT (OUTPATIENT)
Dept: PULMONOLOGY | Facility: CLINIC | Age: 70
End: 2020-02-18
Payer: MEDICARE

## 2020-02-18 ENCOUNTER — LAB VISIT (OUTPATIENT)
Dept: LAB | Facility: HOSPITAL | Age: 70
End: 2020-02-18
Attending: INTERNAL MEDICINE
Payer: MEDICARE

## 2020-02-18 VITALS
BODY MASS INDEX: 21.67 KG/M2 | HEART RATE: 83 BPM | WEIGHT: 163.5 LBS | SYSTOLIC BLOOD PRESSURE: 108 MMHG | OXYGEN SATURATION: 98 % | RESPIRATION RATE: 20 BRPM | HEIGHT: 73 IN | DIASTOLIC BLOOD PRESSURE: 52 MMHG

## 2020-02-18 DIAGNOSIS — I10 ESSENTIAL HYPERTENSION: Chronic | ICD-10-CM

## 2020-02-18 DIAGNOSIS — J96.11 CHRONIC RESPIRATORY FAILURE WITH HYPOXIA: ICD-10-CM

## 2020-02-18 DIAGNOSIS — J43.2 CENTRILOBULAR EMPHYSEMA: ICD-10-CM

## 2020-02-18 DIAGNOSIS — E78.5 HYPERLIPIDEMIA, UNSPECIFIED HYPERLIPIDEMIA TYPE: Chronic | ICD-10-CM

## 2020-02-18 DIAGNOSIS — J96.11 CHRONIC RESPIRATORY FAILURE WITH HYPOXIA: Primary | ICD-10-CM

## 2020-02-18 PROCEDURE — 3074F SYST BP LT 130 MM HG: CPT | Mod: HCNC,CPTII,S$GLB, | Performed by: INTERNAL MEDICINE

## 2020-02-18 PROCEDURE — 3078F DIAST BP <80 MM HG: CPT | Mod: HCNC,CPTII,S$GLB, | Performed by: INTERNAL MEDICINE

## 2020-02-18 PROCEDURE — 99999 PR PBB SHADOW E&M-EST. PATIENT-LVL IV: CPT | Mod: PBBFAC,HCNC,, | Performed by: INTERNAL MEDICINE

## 2020-02-18 PROCEDURE — 3074F PR MOST RECENT SYSTOLIC BLOOD PRESSURE < 130 MM HG: ICD-10-PCS | Mod: HCNC,CPTII,S$GLB, | Performed by: INTERNAL MEDICINE

## 2020-02-18 PROCEDURE — 82103 ALPHA-1-ANTITRYPSIN TOTAL: CPT | Mod: HCNC

## 2020-02-18 PROCEDURE — 99214 PR OFFICE/OUTPT VISIT, EST, LEVL IV, 30-39 MIN: ICD-10-PCS | Mod: HCNC,S$GLB,, | Performed by: INTERNAL MEDICINE

## 2020-02-18 PROCEDURE — 1101F PT FALLS ASSESS-DOCD LE1/YR: CPT | Mod: HCNC,CPTII,S$GLB, | Performed by: INTERNAL MEDICINE

## 2020-02-18 PROCEDURE — 3078F PR MOST RECENT DIASTOLIC BLOOD PRESSURE < 80 MM HG: ICD-10-PCS | Mod: HCNC,CPTII,S$GLB, | Performed by: INTERNAL MEDICINE

## 2020-02-18 PROCEDURE — 1101F PR PT FALLS ASSESS DOC 0-1 FALLS W/OUT INJ PAST YR: ICD-10-PCS | Mod: HCNC,CPTII,S$GLB, | Performed by: INTERNAL MEDICINE

## 2020-02-18 PROCEDURE — 99999 PR PBB SHADOW E&M-EST. PATIENT-LVL IV: ICD-10-PCS | Mod: PBBFAC,HCNC,, | Performed by: INTERNAL MEDICINE

## 2020-02-18 PROCEDURE — 36415 COLL VENOUS BLD VENIPUNCTURE: CPT | Mod: HCNC

## 2020-02-18 PROCEDURE — 1159F MED LIST DOCD IN RCRD: CPT | Mod: HCNC,S$GLB,, | Performed by: INTERNAL MEDICINE

## 2020-02-18 PROCEDURE — 99499 RISK ADDL DX/OHS AUDIT: ICD-10-PCS | Mod: HCNC,S$GLB,, | Performed by: INTERNAL MEDICINE

## 2020-02-18 PROCEDURE — 99214 OFFICE O/P EST MOD 30 MIN: CPT | Mod: HCNC,S$GLB,, | Performed by: INTERNAL MEDICINE

## 2020-02-18 PROCEDURE — 82103 ALPHA-1-ANTITRYPSIN TOTAL: CPT | Mod: 91,HCNC

## 2020-02-18 PROCEDURE — 99499 UNLISTED E&M SERVICE: CPT | Mod: HCNC,S$GLB,, | Performed by: INTERNAL MEDICINE

## 2020-02-18 PROCEDURE — 1159F PR MEDICATION LIST DOCUMENTED IN MEDICAL RECORD: ICD-10-PCS | Mod: HCNC,S$GLB,, | Performed by: INTERNAL MEDICINE

## 2020-02-18 NOTE — PROGRESS NOTES
Subjective:       Patient ID: Kodi Ribeiro is a 70 y.o. male.  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    Ureteral stricture, left    Left carotid artery stenosis    Ureteral stricture    History of transmetatarsal amputation of left foot    Iron deficiency anemia due to chronic blood loss    Persistent proteinuria    CKD stage G4/A3, GFR 15-29 and albumin creatinine ratio >300 mg/g    Elevated serum immunoglobulin free light chains    Unstable angina    Hypomagnesemia    Renal dysfunction    Hydronephrosis of left kidney    Coronary artery disease involving native coronary artery of native heart without angina pectoris    Chronic respiratory failure with hypoxia    Chest pain, midsternal, H/O known CAD     Centrilobular emphysema    Follow up    Dysthymia      Immunization History   Administered Date(s) Administered    Influenza - High Dose - PF (65 years and older) 2014, 2016, 10/23/2017, 2018, 2019    Influenza Split 2013, 2013    Pneumococcal Conjugate - 13 Valent 2014    Pneumococcal Polysaccharide - 23 Valent 2016    Tdap 2014    Zoster 2012      Social History     Tobacco Use   Smoking Status Former Smoker    Packs/day: 1.00    Years: 15.00    Pack years: 15.00    Last attempt to quit: 2009    Years since quittin.1   Smokeless Tobacco Never Used    COPD Questionnaire  How often do you cough?: Some of the time  How often do you have phlegm (mucus) in your chest?: A little of the time  How often does  your chest feel tight?: Never  When you walk up a hill or one flight of stairs, how often are you breathless?: All of the time  How often are you limited doing any activities at home?: Never  How often are you confident leaving the house despite your lung condition?: Some of the time  How often do you sleep soundly?: Almost never  How often do you have energy?: Some of the time  Total score: 18      Home Oxygen Evaluation     Date Performed: 2/5/2020     1)         Patient's Home O2 Sat on room air, while at rest: 87                               If O2 sats on room air at rest are 88% or below, patient qualifies. No additional testing needed. Document N/A in steps 2 and 3. If 89% or above, complete steps 2.        2)         Patient's O2 Sat on room air while exercising: NA                               If O2 sats on room air while exercising remain 89% or above patient does not qualify, no further testing needed Document N/A in step 3. If O2 sats on room air while exercising are 88% or below, continue to step 3.        3)         Patient's O2 Sat while exercising on O2: NA at NA LPM                                           (Must show improvement from #2 for patients to qualify)     If O2 sats improve on oxygen, patient qualifies for portable oxygen. If not, the patient does not qualify.       [facility] Medicine  Discharge Summary        Patient Name: Kodi Ribeiro  MRN: [id number]  Admission Date: 1/31/2020  Hospital Length of Stay: 6 days  Discharge Date and Time:  02/06/2020 7:07 PM  Attending Physician: No att. providers found   Discharging Provider: Markell Magaña MD  Primary Care Provider: Norma Nuñez MD        HPI:    69 year old man with extensive past medical and surgery history of    stroke, spinal cord disease, peripheral vascular disease, MI (per patient, 2000 & 2012), lipoma of colon, infection of aortic graft, HTN, HLD, hyperglycemia, horseshoe kidney, hemorrhoids,  GERD. He was recently discharged from the Hospital on  1/15/2020. He presented at this time with chest pain that started few hours prior to presentation.Pain was described a sharp ,non radiating ,not associated with diaphoresis and worsened with exertion. He called his wife and EMS was called.       Chief Complaint:  Kodi Ribeiro is 70 y.o.   Post discharge follow up  Was discharged on oxygen 3 Lpm, however RA SpO2 was 97-98%  During hospitalization BNP wa 1554. EF 55%, likely has HFpEF  No prior sleep testing but used BIPAP while in hospital  Wife asks for POC, discussed CMS rules  Has Emphysema on CT CHEST: No prior spirometry  ABG reveiwed  Not on inhalers  No cough, No wheezing, No SOB  In wheel chair  Has prosthetic left leg  Quit smoking 2009.  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  He does not have any pertinent problems on file.  He  has a past surgical history that includes Aorta - bilateral femoral artery bypass graft (2014); Coronary angioplasty with stent (2000); Abdominal aortic aneurysm repair; Ureteral stent placement (Left); right below knee amputation  (2009 (approx)); Foot amputation through metatarsal (1996); Lung lobectomy (Right, 1970s); Abdominal aortic aneurysm repair (1996/2014); Kidney surgery (2014); Foot surgery (Bilateral, 1980's); Amputation; Tonsillectomy (1955 aprox); Small intestine surgery (2014); Cystoscopy w/ ureteral stent placement (Left, 5/29/2018); Cystoscopy w/ retrogrades (Left, 5/29/2018); Cystoscopy w/ ureteral stent removal (Left, 5/29/2018); Left heart catheterization (Left, 3/7/2019); Cystoscopy w/ ureteral stent placement (Left, 2/4/2020); and Cystoscopy w/ ureteral stent removal (Left, 2/4/2020).  His family history includes COPD in his mother; Cancer in his mother; Diabetes in his daughter; Heart disease in his father.  He  reports that he quit smoking about 11 years ago. He has a 15.00 pack-year smoking history. He has never used smokeless tobacco. He reports that he does not drink alcohol or use drugs.  He has a current medication list which includes the following prescription(s): albuterol-ipratropium, amlodipine, atorvastatin, carvedilol, cetirizine, clopidogrel, ferrous gluconate, folic acid-vit b6-vit b12 2.5-25-2 mg, furosemide, hydralazine, isosorbide mononitrate, mupirocin, omeprazole, oxygen-air delivery systems, sertraline, aspirin, and nitroglycerin.  Current Outpatient Medications on File Prior to Visit   Medication Sig Dispense Refill    albuterol-ipratropium (DUO-NEB) 2.5 mg-0.5 mg/3 mL nebulizer solution Take 3 mLs by nebulization every 8 (eight) hours as needed for Wheezing or Shortness of Breath. Rescue 90 mL 0    amLODIPine (NORVASC) 10 MG tablet TAKE 1 TABLET EVERY DAY 90 tablet 3    atorvastatin (LIPITOR) 40 MG tablet Take 1 tablet (40 mg total) by mouth once daily. 90 tablet 3    carvedilol (COREG) 25 MG tablet Take 1 tablet (25 mg total) by mouth 2 (two) times daily with meals. 60 tablet 11    cetirizine (ZYRTEC) 10 MG tablet Take 10 mg by mouth once daily.      clopidogrel (PLAVIX) 75 mg tablet TAKE 1  "TABLET EVERY DAY 90 tablet 3    ferrous gluconate (FERGON) 324 MG tablet Take 1 tablet (324 mg total) by mouth 2 (two) times daily with meals. 60 tablet 0    folic acid-vit B6-vit B12 2.5-25-2 mg (FOLBIC OR EQUIV) 2.5-25-2 mg Tab Take 1 tablet by mouth once daily. 30 tablet 0    furosemide (LASIX) 20 MG tablet Take two tab on Mon/Wed/Friday and one tab on Tues/Thurs/ Sat/Sunday 40 tablet 0    hydrALAZINE (APRESOLINE) 25 MG tablet Take 1 tablet (25 mg total) by mouth every 12 (twelve) hours. 60 tablet 0    isosorbide mononitrate (IMDUR) 60 MG 24 hr tablet Take 2 tablets (120 mg total) by mouth once daily.  11    mupirocin (BACTROBAN) 2 % ointment Apply topically 3 (three) times daily. 1 Tube 2    omeprazole (PRILOSEC) 20 MG capsule TAKE 1 CAPSULE TWICE DAILY 180 capsule 3    OXYGEN-AIR DELIVERY SYSTEMS MISC by [facility].(Non-Drug; Combo Route) route.      sertraline (ZOLOFT) 50 MG tablet Take 1 tablet (50 mg total) by mouth once daily. 90 tablet 11    aspirin (ECOTRIN) 81 MG EC tablet Take 1 tablet (81 mg total) by mouth once daily.  0    nitroglycerin (NITROSTAT) 0.6 MG Subl Place 1 tablet (0.6 mg total) under the tongue every 5 (five) minutes as needed (max 3/ per episode). 30 tablet 1     No current facility-administered medications on file prior to visit.      He is allergic to morphine..     Review of Systems   All other systems reviewed and are negative.     Objective:     Vitals:    02/18/20 1134   BP: (!) 108/52   Pulse: 83   Resp: 20   SpO2: 98%  Comment: 3 LITERS OXYGEN 75% OF DAY   Weight: 74.2 kg (163 lb 7.5 oz)   Height: 6' 1" (1.854 m)     Physical Exam   Constitutional: He is oriented to person, place, and time. Vital signs are normal. He appears well-developed and well-nourished. He appears ill. Nasal cannula in place.       HENT:   Head: Normocephalic and atraumatic.   Nose: Nose normal.   Mouth/Throat: Oropharynx is clear and moist.   Eyes: Pupils are equal, round, and reactive to light. EOM " are normal.   Neck: Normal range of motion. Neck supple.   Cardiovascular: Normal rate, regular rhythm and normal heart sounds. Exam reveals no friction rub.   No murmur heard.  Pulmonary/Chest: Effort normal and breath sounds normal. No stridor. No respiratory distress.   Abdominal: Soft.   Musculoskeletal: Normal range of motion. He exhibits no edema.   Neurological: He is alert and oriented to person, place, and time. No cranial nerve deficit.   Skin: Skin is warm and dry. Capillary refill takes 2 to 3 seconds.   Psychiatric: He has a normal mood and affect.   Nursing note and vitals reviewed.        Data Review:   CBC:   Lab Results   Component Value Date    WBC 3.61 (L) 02/06/2020    RBC 2.81 (L) 02/06/2020    HGB 7.5 (L) 02/06/2020    HCT 25.6 (L) 02/06/2020    HCT 28 (L) 03/16/2014     02/06/2020     BMP:   Lab Results   Component Value Date     02/06/2020     (L) 02/06/2020    K 4.6 02/06/2020     02/06/2020    CO2 23 02/06/2020    BUN 25 (H) 02/06/2020    CREATININE 2.2 (H) 02/06/2020    CALCIUM 8.6 (L) 02/06/2020     Radiology review: *   Diagnostics: Chest x-ray: *reviewed  CT of chest  02/2020  CLINICAL HISTORY:  interstial fibrosis;    TECHNIQUE:  Low dose axial images, sagittal and coronal reformations were obtained from the thoracic inlet to the lung bases. Contrast was not administered.    COMPARISON:  None    FINDINGS:  Base of Neck: No significant abnormality.    Thoracic soft tissues: Subclavian femoral bypass graft.    Aorta: Atherosclerotic calcifications of the aortic arch.  Coronary artery calcifications.    Heart: Normal size. No effusion.    Pulmonary vasculature: Normal.    Alaina/Mediastinum: Multiple small mediastinal lymph nodes.  The largest lymph nodes are lymph node measuring 1.7 cm in the anterior mediastinum and a pretracheal lymph node measuring 1 point 8 cm in diameter.    Airways: Patent.    Lungs/Pleura: Bilateral pleural effusions small and possibly  loculated on the right side.  Small to moderate pleural effusion on the left side.  Atelectasis at the left lung base versus an infiltrate/consolidation.  Scattered airspace disease can be seen throughout the lung fields bilaterally.  Probable diffuse pulmonary fibrotic changes.  No significant bronchiectasis identified.  Extensive subpleural bulla throughout the lung fields but most significantly in the apical region.    Esophagus: Normal.    Upper Abdomen: Marked hydronephrosis and hydroureter on the left.  Stent is present.  Right-sided nephrolithiasis.  Postsurgical changes in the retroperitoneum on the left.    Bones: Multilevel degenerative thoracic spine.  Old appearing T11 and T12 compression fractures.      Impression       Small right-sided pleural effusion possibly loculated.  Moderate left-sided pleural effusion.  Atelectasis/small consolidations of the lung bases bilaterally.  Diffuse increased interstitial markings throughout the lung fields bilaterally consistent with pulmonary fibrosis.  Evidence of multiple subpleural bulla.  Regions of centrilobular emphysematous changes.  No abnormal mass.  There is evidence of mediastinal adenopathy with an anterior mediastinal node measuring 1.7 cm in a pretracheal node measuring 1.8 cm.  Scattered areas of ill-defined airspace disease can be seen at the lung bases bilaterally.         Component      Latest Ref Rng & Units 2/5/2020   BNP      0 - 99 pg/mL 1,554 (H)     Component      Latest Ref Rng & Units 2/2/2020 2/2/2020           8:29 AM  5:31 AM   POC PH      7.35 - 7.45 7.396 7.248 (LL)   POC PCO2      35 - 45 mmHg 34.2 (L) 49.5 (H)   POC PO2      80 - 100 mmHg 63 (L) 103 (H)   POC HCO3      24 - 28 mmol/L 21.0 (L) 21.6 (L)   POC BE      -2 to 2 mmol/L -4 -6   POC SATURATED O2      95 - 100 % 92 (L) 97   Rate       12 12   Sample       ARTERIAL ARTERIAL   Site       LR RR   Allens Test       Pass N/A   DelSys       CPAP/BiPAP CPAP/BiPAP   Mode       BiPAP  BiPAP   FiO2       45 100   IP       12 10   EP       5 5     Assessment:      Problem List Items Addressed This Visit     Hyperlipidemia (Chronic)     Stable atorvastatin         Essential hypertension (Chronic)     Stable: Norvasc, Coreg, Hydralazine         Chronic respiratory failure with hypoxia - Primary     Has Home Oxygen  Need to validate need either rest/walking/sleep  Will bring up sleep testing next visit         Relevant Orders    PULM - Arterial Blood Gases--in addition to PFT only    Stress test, pulmonary    Complete PFT without bronchodilator - Clinic    Spirometry with/without bronchodilator    Alpha 1 Antitrypsin Phenotype    Alpha-1-antitrypsin    Centrilobular emphysema     Alpha 1  PFT         Relevant Orders    PULM - Arterial Blood Gases--in addition to PFT only    Stress test, pulmonary    Complete PFT without bronchodilator - Clinic    Spirometry with/without bronchodilator    Alpha 1 Antitrypsin Phenotype    Alpha-1-antitrypsin         Plan:        Orders Placed This Encounter   Procedures    Alpha 1 Antitrypsin Phenotype     Standing Status:   Future     Number of Occurrences:   1     Standing Expiration Date:   4/18/2021    Alpha-1-antitrypsin     Standing Status:   Future     Number of Occurrences:   1     Standing Expiration Date:   4/18/2021    PULM - Arterial Blood Gases--in addition to PFT only     Standing Status:   Future     Standing Expiration Date:   2/17/2021    Stress test, pulmonary     Standing Status:   Future     Standing Expiration Date:   2/17/2021    Complete PFT without bronchodilator - Clinic     Standing Status:   Future     Standing Expiration Date:   2/18/2021    Spirometry with/without bronchodilator     Standing Status:   Future     Number of Occurrences:   1     Standing Expiration Date:   2/17/2021          Follow up in about 2 weeks (around 3/3/2020), or labs today, [place] , ONSAT, 6MWD, PFT,  WITH MS STEPHENS .    This note was prepared using voice  recognition system and is likely to have sound alike errors that may have been overlooked even after proof reading.  Please call me with any questions    Discussed diagnosis, its evaluation, treatment and usual course. All questions answered.    Thank you for the courtesy of participating in the care of this patient    Jostin Arzate MD

## 2020-02-18 NOTE — PATIENT INSTRUCTIONS
Using Oxygen at Home  Your healthcare provider has prescribed oxygen to help make breathing easier for you. You will be shown how to use your oxygen unit. Below are some guidelines on using oxygen at home safely. Do all steps each time you use your oxygen unit.   Note: Instructions will vary based on the type of oxygen device you use.    Step 1. Check your supply  · Pressurize your oxygen tank (compressed oxygen tanks only). Other devices may simply be switched on. Make sure you follow the instructions provided by your healthcare provider or medical equipment company.  · Check the oxygen supply level on the tank to be sure you have enough. Your medical supply company will tell you when to call them to let them know that you need more oxygen. Or they will deliver your oxygen on a regular schedule.  · If you have a humidifier bottle, check the water level. When it is at or below 1/2 full, refill it with sterile or distilled water.  Step 2. Attach the tubing  · Attach the cannula tubing (long oxygen tubing) to your oxygen source as you have been shown.  · Be sure the tubing is not bent or blocked.  Step 3. Set your prescribed flow rate  · Set the oxygen to flow at the rate your healthcare provider has prescribed. This is _____________.  · Never change this rate unless told to by your healthcare provider.  Step 4. Insert the cannula  · Insert the nasal cannula into your nose and breathe through your nose normally.  · If youre not sure whether oxygen is flowing, place the nasal cannula in a glass of water. Bubbles mean that oxygen is flowing.  Follow safety guidelines when using oxygen in your home  · Avoid open flames. This includes cigarettes, matches, candles, fireplaces, gas burners, pipes, or anything else that could start a fire.  · Don't smoke or be around others who are smoking.  · Keep oxygen tanks at least 5 feet from gas stoves, space heaters, electric or gas heaters, or any heat sources.  · Don't use  lotions or creams that contain petroleum jelly. This substance can be flammable when mixed with pure oxygen.  · Turn oxygen off when you aren't using it.  · Store the oxygen cylinder upright in a secure, approved storage device.   · Make sure you know what to do in an emergency. Your emergency numbers should include 911 (or your area's emergency number), your healthcare provider, and your medical supply company.  · Always follow the instructions for safe use as recommended by your medical supply company. Not using oxygen safely at home can put you and your neighbors at higher risk for fires and burns.   Maintain your equipment  Ask your medical supply company how often you should change your nasal cannula tubing, your cannula, and your humidifier bottle, if you have one.  Date Last Reviewed: 5/1/2016  © 2640-0612 [employer]. [address] [place] [address], [place] [address]. All rights reserved. This information is not intended as a substitute for professional medical care. Always follow your healthcare professional's instructions.

## 2020-02-18 NOTE — ASSESSMENT & PLAN NOTE
Has Home Oxygen  Need to validate need either rest/walking/sleep  Will bring up sleep testing next visit

## 2020-02-18 NOTE — PLAN OF CARE
Problem: Wound  Goal: Optimal Wound Healing  2/5/2020 1939 by Mainor Carrera LPN  Outcome: Ongoing, Progressing       Problem: Fall Injury Risk  Goal: Absence of Fall and Fall-Related Injury  2/5/2020 1939 by Mainor Carrera LPN  Outcome: Ongoing, Progressing    Problem: Adult Inpatient Plan of Care  Goal: Plan of Care Review  2/5/2020 1939 by Mainor Carrera LPN  Outcome: Ongoing, Progressing    Goal: Patient-Specific Goal (Individualization)  2/5/2020 1939 by Mainor Crarera LPN  Outcome: Ongoing, Progressing    Goal: Absence of Hospital-Acquired Illness or Injury  2/5/2020 1939 by Mainor Carrera LPN  Outcome: Ongoing, Progressing    Goal: Optimal Comfort and Wellbeing  2/5/2020 1939 by Mainor Carrera LPN  Outcome: Ongoing, Progressing    Goal: Readiness for Transition of Care  2/5/2020 1939 by Mainor Carrera LPN  Outcome: Ongoing, Progressing    Goal: Rounds/Family Conference  2/5/2020 1939 by Mainor Carrera LPN  Outcome: Ongoing, Progressing        OCCUPATIONAL THERAPY RE-EVALUATION      Date:2020  Patient Name: Gely Berger  MRN: [id number]  : 3/9/1928  Room: [address] [place] [address]A    Modified Roland Scale   Score     Description  0             No symptoms  1             No significant disability despite symptoms  2             Slight disability; able to look after own affairs  3             Moderate disability; able to ambulate without assist/ requires assist with ADLs  4             Moderate/Severe disability;requires assist to ambulate/assist with ADLs  5             Severe disability;bedridden/incontinent   6               Score:   4    Evaluating OT: Homar Leung OTR/L  -Pt re-evaluated s/p asif hole procedure  Referring Provider: CINDY Calderón    AM-PAC Daily Activity Raw Score:   Recommended Adaptive Equipment: to be determined     Comments: Based on patient's functional performance as stated above and level of assistance needed prior to admission, this therapist believes that the patient would benefit from further skilled OT following hospital stay in an effort to increase safety, functional independence, and quality of life. Diagnosis: Subdural hematoma (HCC) [S06.5X9A]  Bilateral subdural hematomas ([address] [S06.5X9A]    Surgery: Gladys Median hole evacuation of a right frontoparietal SDH  Pertinent Medical History: acute MI, atrial arrhythmia, bradycardia, CAD, hemiparesis, HTN, moderate aortic stenosis    Precautions:  Falls, mild [place], bed alarm, R shoulder pain- rotator cuff injury (no intervention per ortho, f/u outpt), SBP goal <160, R side scalp drain     Home Living: Pt lives alone in a 1 story with 2 step(s) to enter and 1 rail(s); bed/bath on main floor. Bathroom setup: pt reports he sponge bathes at baseline  Equipment owned: ww    Prior Level of Function: independent with ADLs and assistance with IADLs; using ww for ambulation.  Pt reports nieces come in to assist with food, laundry, shopping, and transportation  Driving: no  Occupation: retired from jewelry store    Pain Level: 2/10 pain in chest, pt reports RN aware, chest pain has decreased since last night when it was sharp, intense pain  Cognition: A&O: 4/4; Follows 2-3 step directions   Memory: F-; P immediate word recall 2/3   Sequencing: F   Problem solving: F   Judgement/safety: F   RASS: 0  CAM-ICU: Positive     Functional Assessment:   RE-Eval Status  Date: 2/14/20 Treatment Status  Date: STG=LTG (~5-14  days)     Feeding Set up A       improve self feeding task to independent   Grooming Mod A overall  To brush teeth, wipe face, and wash hands in standing; Assistance for packaging mgmt d/t FM impairment; mod A for standing balance (posterior lean) with fatigue, corrected with cues to CGA    improve grooming/hygiene tasks to SBA while standing at sink    UB Dressing Min A      improve UB dressing to mod I   LB Dressing Min A overall  SBA to doff/norma socks while seated at bedside chair    improve LB dressing to SBA with AE PRN   Bathing Min A    improve UB/LB bathing to SBA   Toileting Min A overall    improve toileting task and all clothing mgmt to Supervision   Bed Mobility  Supine to sit: Min A  Sit to supine: NT  Max cues for appropriate sequencing  improve bed mobility to SBA in prep for EOB ADL tasks   Functional Transfers Sit to stand: Min A  Stand to sit: Min A  Mod cues for hand placement, body alignment  improve all functional transfers to SBA   Functional Mobility Min A ww  For side steps at EOB; pt declining to mobilize further d/t fatigue  improve functional mobility to SBA with ww   Balance Sitting:     Static: supervision    Dynamic: min A  Standing: Min A ww  demo independent dynamic sitting balance EOB during ADL tasks   Endurance/Activity Tolerance F  demo G- activity tolerance/endurance during 15-20 min ADL task    Visual/  Perceptual Glasses: reading              Hand dominance: R  UE ROM: RUE: R shoulder ~40* AROM, ~90* AROM    LUE: WFL  Strength: RUE: grossly -2/5 LUE: grossly 4-/5   Strength: WFL  Fine Motor Coordination: mild impairments    Hearing: mild [place]  Sensation: No c/o numbness or tingling  Tone: WNL  Edema: unremarkable    Vitals:  Heart Rate at rest 51 Heart Rate post session 54   SPO2 at rest 95% SPO2 post session 100%   Blood pressure at rest 174/77 Blood Pressure post session 165/83   Comments: BP- Semi supine  174/77, /65, sitting in chair 165/83                          Comments: Overall, patient demonstrates mild difficulties with completion of BADLs and IADLs. Factors contributing to these difficulties include decreased endurance, impaired FM skills, impaired R shoulder ROM/strength, generalized weakness, and overall medical status. As noted above, patient likely to benefit from further OT intervention to increase independence, safety, and overall quality of life. Treatment: OK from RN to see patient. Evaluation completed with PT collaboration. Upon arrival patient supine with HOB slightly elevated. Pt agreeable to therapy session, BP elevated as above but denies symptoms. Therapist facilitated bed mobility. Sitting EOB roughly 5 minutes, denies dizziness with positional changes- SBP within goal. STS and functional mobility completed in knight and back to sink. Standing grooming as above. To bedside chair. Patient properly positioned to improve interaction with environment, overall functioning and decrease/prevent edema and contractures. Feeding as above. Implementation and training for non-pharmacologic interventions for delirium prevention incorporated throughout session to patient. After session, patient in position as stated above with all devices within reach, all lines and tubes intact. Pt demo's G understanding for use of call light to contact nurse before getting out of chair. Pt required cues and education as noted above for safe facilitation and completion of tasks.  During functional activites and ADLs pt educated on

## 2020-02-19 LAB — A1AT SERPL-MCNC: 146 MG/DL (ref 100–190)

## 2020-02-20 ENCOUNTER — CLINICAL SUPPORT (OUTPATIENT)
Dept: PULMONOLOGY | Facility: CLINIC | Age: 70
End: 2020-02-20
Payer: MEDICARE

## 2020-02-20 DIAGNOSIS — J43.2 CENTRILOBULAR EMPHYSEMA: ICD-10-CM

## 2020-02-20 DIAGNOSIS — J96.11 CHRONIC RESPIRATORY FAILURE WITH HYPOXIA: Primary | ICD-10-CM

## 2020-02-20 DIAGNOSIS — J96.11 CHRONIC RESPIRATORY FAILURE WITH HYPOXIA: ICD-10-CM

## 2020-02-20 DIAGNOSIS — R06.02 SOB (SHORTNESS OF BREATH): ICD-10-CM

## 2020-02-20 PROCEDURE — 94762 PR NONINVASV OXYGEN SATUR, CONT: ICD-10-PCS | Mod: HCNC,S$GLB,, | Performed by: INTERNAL MEDICINE

## 2020-02-20 PROCEDURE — 94762 N-INVAS EAR/PLS OXIMTRY CONT: CPT | Mod: HCNC,S$GLB,, | Performed by: INTERNAL MEDICINE

## 2020-02-20 NOTE — PROGRESS NOTES
Unable to complete 6 minute walk. Patient too unstable to continue. Patient dizzy, unsteady,  blood pressure during walk 98/52, feeling lightheaded, SOB. Patient returned to lab, spo2 remains at 98%. Continued to monitor BP while resting. BP increased to 147/65. Gave patient a cup of water.  Patient could not perform spirometry either. Called [facility] and left message for Dr. Arzate.

## 2020-02-20 NOTE — PROGRESS NOTES
Patient unable to complete due to dizziness, BP issues, unstable when standing/walking and SOB. Left message for Dr. Arzate at [facility].

## 2020-02-21 ENCOUNTER — EXTERNAL HOME HEALTH (OUTPATIENT)
Dept: HOME HEALTH SERVICES | Facility: HOSPITAL | Age: 70
End: 2020-02-21
Payer: MEDICARE

## 2020-02-21 LAB
A1AT PHENOTYP SERPL-IMP: ABNORMAL BANDS
A1AT SERPL NEPH-MCNC: 198 MG/DL (ref 100–190)

## 2020-02-21 NOTE — PROCEDURES
[facility] [address] * [place] [address]  Telephone: [phone]  Test date: 20 Start: 20 01:11:17 Kodi Ribeiro  Doctor: Dr. Arzate End: 20 02:45:33 [id number]  Oximetry: Summary Report  Comments: room air  Recording time: :34:16 Highest pulse: 87 Highest SpO2: 98%  Excluded samplin:00:00 Lowest pulse: 63 Lowest SpO2: 89%  Total valid samplin:34:16 Mean pulse: 68 Mean SpO2: 93.6%  1 S.D.: 3.4 1 S.D.: 2.2  Time with SpO2<90: 0:01:04, 1.1%  Time with SpO2<80: 0:00:00, 0.0%  Time with SpO2<70: 0:00:00, 0.0%  Time with SpO2<60: 0:00:00, 0.0%  Time with SpO2<89: 0:00:00, 0.0%  Time with SpO2 =>90: 1:33:12, 98.9%  Time with SpO2=>80 & <90: 0:01:04, 1.1%  Time with SpO2=>70 & <80: 0:00:00, 0.0%  Time with SpO2=>60 & <70: 0:00:00, 0.0%  There was no time spent with a saturation less than or equal to 88.  A desaturation event was defined as a decrease of saturation by 4 or more.  No events were excluded due to artifact.  There were 3 desaturation events over 3 minutes duration.  There were 40 desaturation events of less than 3 minutes duration during which:  The mean high was 95.6%. The mean low was 90.4%.  The number of these events that were:  > 0 & <10 seconds: 1 > 0 seconds: 40  =>10 & <20 seconds: 20 =>10 seconds: 39  =>20 & <30 seconds: 0 =>20 seconds: 19  =>30 & <40 seconds: 4 =>30 seconds: 19  =>40 & <50 seconds: 4 =>40 seconds: 15  =>50 & <60 seconds: 2 =>50 seconds: 11  =>60 seconds: 9 =>60 seconds: 9  The mean length of desaturation events that were >=10 sec & <=3 mins was: 46.9 sec.  Desaturation event index (events >=10 sec per sampled hour): 24.8  Desaturation event index (events >= 0 sec per sampled hour): 25.5  © 4558-4404 [employer] Oximetry version Standard BD2903.  Oximeter: [employer] 920M Plus memory, 4 second resolution.    OVERNIGHT OXIMETRY REPORT:    Dictated by: Jostin Arzate MD  Test date:  2020  Dictated on: 2020      Comment: This test was performed on Room Air     A desaturation event was defined as a decrease of saturation by 4 or more.    REPORT SUMMARY  Total valid samplin:34:16   High SpO2: 98%    Low SpO2: 89%    Mean SpO2  93.6 %  Cumulative time with oxygen saturation less than 88% (TC88): 00:00:00    CLINICAL INTERPRETATION  Suboptimal total study time ( need => 4 hrs) CONSIDER REPEAT TEST. , Results are normal,  There is no significant nocturnal oxygen desaturation and  Clinical correlation is advised    Medicare Criteria Comments:   Oximetry test results suggest the patient does not fall under Medicare Group 1 Criteria. ( Arterial oxygen saturation at or below 88% for at least 5 minutes taken during sleep)  Jostin Arzate MD    Details about Medicare Group Criteria coverage can be found at http://www.cms.hhs.gov/manuals/downloads/

## 2020-02-24 ENCOUNTER — TELEPHONE (OUTPATIENT)
Dept: PULMONOLOGY | Facility: CLINIC | Age: 70
End: 2020-02-24

## 2020-02-24 NOTE — TELEPHONE ENCOUNTER
----- Message from Jostin Arzate MD sent at 2020  5:25 PM CST -----   Please inform patient overnight saturation study did not show on the need for nighttime oxygen  The total sleep time was in adequate.  Needs to be 4 hr.  We only have data for 1 hr 34 min.  I would recommend repeat that test  The test did not show need for oxygen although this study time was in adequate  Please send a copy of the report to the patient    Jostin Arzate MD    OVERNIGHT OXIMETRY REPORT:    Dictated by: Jostin Arzate MD  Test date: 2020  Dictated on: 2020      Comment: This test was performed on Room Air     A desaturation event was defined as a decrease of saturation by 4 or more.    REPORT SUMMARY  Total valid samplin:34:16   High SpO2: 98%    Low SpO2: 89%    Mean SpO2  93.6 %  Cumulative time with oxygen saturation less than 88% (TC88): 00:00:00    CLINICAL INTERPRETATION  Suboptimal total study time ( need => 4 hrs) CONSIDER REPEAT TEST. , Results are normal,  There is no significant nocturnal oxygen desaturation and  Clinical correlation is advised    Medicare Criteria Comments:   Oximetry test results suggest the patient does not fall under Medicare Group 1 Criteria. ( Arterial oxygen saturation at or below 88% for at least 5 minutes taken during sleep)  Jostin Arzate MD    Details about Medicare Group Criteria coverage can be found at http://www.cms.hhs.gov/manuals/downloads/

## 2020-02-24 NOTE — TELEPHONE ENCOUNTER
Spoke with pt wife. Informed her that over night saturation study did not show the need for oxygen at night, but he only slept with device for only an hour and a half and recommends that he repeat the test with at least four hours. Pt wife verbalized understanding and will call to schedule repeat test when ready.

## 2020-02-26 ENCOUNTER — LAB VISIT (OUTPATIENT)
Dept: LAB | Facility: HOSPITAL | Age: 70
End: 2020-02-26
Attending: INTERNAL MEDICINE
Payer: MEDICARE

## 2020-02-26 ENCOUNTER — CLINICAL SUPPORT (OUTPATIENT)
Dept: PULMONOLOGY | Facility: CLINIC | Age: 70
End: 2020-02-26
Payer: MEDICARE

## 2020-02-26 DIAGNOSIS — N18.30 CKD (CHRONIC KIDNEY DISEASE) STAGE 3, GFR 30-59 ML/MIN: Chronic | ICD-10-CM

## 2020-02-26 DIAGNOSIS — J43.2 CENTRILOBULAR EMPHYSEMA: ICD-10-CM

## 2020-02-26 DIAGNOSIS — J96.11 CHRONIC RESPIRATORY FAILURE WITH HYPOXIA: Primary | ICD-10-CM

## 2020-02-26 LAB
ALBUMIN SERPL BCP-MCNC: 3.2 G/DL (ref 3.5–5.2)
ANION GAP SERPL CALC-SCNC: 8 MMOL/L (ref 8–16)
BASOPHILS # BLD AUTO: 0.04 K/UL (ref 0–0.2)
BASOPHILS NFR BLD: 0.7 % (ref 0–1.9)
BUN SERPL-MCNC: 32 MG/DL (ref 8–23)
CALCIUM SERPL-MCNC: 8.3 MG/DL (ref 8.7–10.5)
CHLORIDE SERPL-SCNC: 105 MMOL/L (ref 95–110)
CO2 SERPL-SCNC: 21 MMOL/L (ref 23–29)
CREAT SERPL-MCNC: 2 MG/DL (ref 0.5–1.4)
DIFFERENTIAL METHOD: ABNORMAL
EOSINOPHIL # BLD AUTO: 0.3 K/UL (ref 0–0.5)
EOSINOPHIL NFR BLD: 5.5 % (ref 0–8)
ERYTHROCYTE [DISTWIDTH] IN BLOOD BY AUTOMATED COUNT: 17.9 % (ref 11.5–14.5)
EST. GFR  (AFRICAN AMERICAN): 37.7 ML/MIN/1.73 M^2
EST. GFR  (NON AFRICAN AMERICAN): 32.6 ML/MIN/1.73 M^2
GLUCOSE SERPL-MCNC: 96 MG/DL (ref 70–110)
HCT VFR BLD AUTO: 20.7 % (ref 40–54)
HGB BLD-MCNC: 6.1 G/DL (ref 14–18)
IMM GRANULOCYTES # BLD AUTO: 0.02 K/UL (ref 0–0.04)
IMM GRANULOCYTES NFR BLD AUTO: 0.3 % (ref 0–0.5)
LYMPHOCYTES # BLD AUTO: 1 K/UL (ref 1–4.8)
LYMPHOCYTES NFR BLD: 16.4 % (ref 18–48)
MCH RBC QN AUTO: 28.2 PG (ref 27–31)
MCHC RBC AUTO-ENTMCNC: 29.5 G/DL (ref 32–36)
MCV RBC AUTO: 96 FL (ref 82–98)
MONOCYTES # BLD AUTO: 0.6 K/UL (ref 0.3–1)
MONOCYTES NFR BLD: 9.4 % (ref 4–15)
NEUTROPHILS # BLD AUTO: 4.1 K/UL (ref 1.8–7.7)
NEUTROPHILS NFR BLD: 67.7 % (ref 38–73)
NRBC BLD-RTO: 0 /100 WBC
PHOSPHATE SERPL-MCNC: 3.8 MG/DL (ref 2.7–4.5)
PLATELET # BLD AUTO: 236 K/UL (ref 150–350)
PMV BLD AUTO: 10.5 FL (ref 9.2–12.9)
POTASSIUM SERPL-SCNC: 4.1 MMOL/L (ref 3.5–5.1)
PTH-INTACT SERPL-MCNC: 360 PG/ML (ref 9–77)
RBC # BLD AUTO: 2.16 M/UL (ref 4.6–6.2)
SODIUM SERPL-SCNC: 134 MMOL/L (ref 136–145)
WBC # BLD AUTO: 6.05 K/UL (ref 3.9–12.7)

## 2020-02-26 PROCEDURE — 85025 COMPLETE CBC W/AUTO DIFF WBC: CPT | Mod: HCNC

## 2020-02-26 PROCEDURE — 83970 ASSAY OF PARATHORMONE: CPT | Mod: HCNC

## 2020-02-26 PROCEDURE — 94762 N-INVAS EAR/PLS OXIMTRY CONT: CPT | Mod: HCNC,S$GLB,, | Performed by: INTERNAL MEDICINE

## 2020-02-26 PROCEDURE — 80069 RENAL FUNCTION PANEL: CPT | Mod: HCNC

## 2020-02-26 PROCEDURE — 94762 PR NONINVASV OXYGEN SATUR, CONT: ICD-10-PCS | Mod: HCNC,S$GLB,, | Performed by: INTERNAL MEDICINE

## 2020-02-26 PROCEDURE — 36415 COLL VENOUS BLD VENIPUNCTURE: CPT | Mod: HCNC,PO

## 2020-02-26 PROCEDURE — 82610 CYSTATIN C: CPT | Mod: HCNC

## 2020-02-27 ENCOUNTER — LAB VISIT (OUTPATIENT)
Dept: LAB | Facility: HOSPITAL | Age: 70
End: 2020-02-27
Attending: INTERNAL MEDICINE
Payer: MEDICARE

## 2020-02-27 ENCOUNTER — INFUSION (OUTPATIENT)
Dept: INFUSION THERAPY | Facility: HOSPITAL | Age: 70
End: 2020-02-27
Attending: INTERNAL MEDICINE
Payer: MEDICARE

## 2020-02-27 ENCOUNTER — TELEPHONE (OUTPATIENT)
Dept: PULMONOLOGY | Facility: CLINIC | Age: 70
End: 2020-02-27

## 2020-02-27 ENCOUNTER — TELEPHONE (OUTPATIENT)
Dept: NEPHROLOGY | Facility: CLINIC | Age: 70
End: 2020-02-27

## 2020-02-27 VITALS
RESPIRATION RATE: 20 BRPM | SYSTOLIC BLOOD PRESSURE: 120 MMHG | HEART RATE: 63 BPM | TEMPERATURE: 98 F | OXYGEN SATURATION: 98 % | DIASTOLIC BLOOD PRESSURE: 55 MMHG

## 2020-02-27 DIAGNOSIS — N18.4 ANEMIA OF CHRONIC RENAL FAILURE, STAGE 4 (SEVERE): ICD-10-CM

## 2020-02-27 DIAGNOSIS — N18.4 ANEMIA OF CHRONIC RENAL FAILURE, STAGE 4 (SEVERE): Primary | ICD-10-CM

## 2020-02-27 DIAGNOSIS — D63.1 ANEMIA OF CHRONIC RENAL FAILURE, STAGE 4 (SEVERE): ICD-10-CM

## 2020-02-27 DIAGNOSIS — D64.9 ANEMIA: Primary | ICD-10-CM

## 2020-02-27 DIAGNOSIS — D63.1 ANEMIA OF CHRONIC RENAL FAILURE, STAGE 4 (SEVERE): Primary | ICD-10-CM

## 2020-02-27 LAB
ABO + RH BLD: NORMAL
BLD GP AB SCN CELLS X3 SERPL QL: NORMAL
BLD PROD TYP BPU: NORMAL
BLOOD UNIT EXPIRATION DATE: NORMAL
BLOOD UNIT TYPE CODE: 5100
BLOOD UNIT TYPE: NORMAL
CODING SYSTEM: NORMAL
DISPENSE STATUS: NORMAL
NUM UNITS TRANS PACKED RBC: NORMAL

## 2020-02-27 PROCEDURE — 86920 COMPATIBILITY TEST SPIN: CPT | Mod: HCNC

## 2020-02-27 PROCEDURE — 36430 TRANSFUSION BLD/BLD COMPNT: CPT | Mod: HCNC

## 2020-02-27 PROCEDURE — P9016 RBC LEUKOCYTES REDUCED: HCPCS | Mod: HCNC

## 2020-02-27 PROCEDURE — 86850 RBC ANTIBODY SCREEN: CPT | Mod: HCNC

## 2020-02-27 RX ORDER — HYDROCODONE BITARTRATE AND ACETAMINOPHEN 500; 5 MG/1; MG/1
TABLET ORAL
Status: DISCONTINUED | OUTPATIENT
Start: 2020-02-27 | End: 2020-02-27 | Stop reason: HOSPADM

## 2020-02-27 NOTE — TELEPHONE ENCOUNTER
S/w wife about blood transfusion the patient was sleeping. She said they can come after np gets done today with his visit. Gave the call to transfusion scheduling.so they can arrange the time.2/27/2020/0817/sf

## 2020-02-27 NOTE — TELEPHONE ENCOUNTER
----- Message from Jostin Arzate MD sent at 2020  2:20 PM CST -----  He did not complete walk test.  Will like him to attempt it again and Room air ABG    Thanks    Jostin Arzate MD    ----- Message -----  From: Matilda Frankel MA  Sent: 2020   2:12 PM CST  To: Jostin Arzate MD    Spoke with pt wife. I informed her that pt did not need oxygen at night and she wants to know if pt still needs it during the day. She stated that pt doesn't do anything strenuous to cause him to use the oxygen. Please advise.    ----- Message -----  From: Jostin Arzate MD  Sent: 2020   1:45 PM CST  To: [facility] Frankel Staff     Please inform patient overnight saturation study did not show on the need for nighttime oxygen    MAIL A COPY OF RESULT    Jostin Arzate MD    OVERNIGHT OXIMETRY REPORT:    Dictated by: Jostin Arzate MD  Test date: 2020  Dictated on: 2020      Comment: This test was performed on Room Air     A desaturation event was defined as a decrease of saturation by 4 or more.    REPORT SUMMARY  Total valid samplin:16:16   High SpO2: 99%    Low SpO2: 88%    Mean SpO2  95.1 %  Cumulative time with oxygen saturation less than 88% (TC88): 00:00:16    CLINICAL INTERPRETATION  Results are normal,  There is no significant nocturnal oxygen desaturation and  Clinical correlation is advised    Medicare Criteria Comments:   Oximetry test results suggest the patient does not fall under Medicare Group 1 Criteria. ( Arterial oxygen saturation at or below 88% for at least 5 minutes taken during sleep)  Jostin Azrate MD    Details about Medicare Group Criteria coverage can be found at http://www.cms.hhs.gov/manuals/downloads/

## 2020-02-27 NOTE — PLAN OF CARE
Pt tolerated transfusion of one unit prbc without s/s reaction. VSS.  Discharged to ride per wheelchair .  Accompanied by spouse and personal belongings.  AVS, printed , reviewed.

## 2020-02-27 NOTE — PROCEDURES
[facility]  [address] * [place] [address]  Telephone: [phone]  Test date: 20 Start: 20 01:07:41 Kodi Ribeiro  Doctor: Dr. Arzate End: 20 08:25:41 [id number]  Oximetry: Summary Report  Comments: Room Air  Recording time: 07:18:00 Highest pulse: 83 Highest SpO2: 99%  Excluded samplin:01:44 Lowest pulse: 59 Lowest SpO2: 88%  Total valid samplin:16:16 Mean pulse: 63 Mean SpO2: 95.1%  1 S.D.: 3.0 1 S.D.: 1.9  Time with SpO2<90: 0:00:24, 0.1%  Time with SpO2<80: 0:00:00, 0.0%  Time with SpO2<70: 0:00:00, 0.0%  Time with SpO2<60: 0:00:00, 0.0%  Time with SpO2<89: 0:00:16, 0.1%  Time with SpO2 =>90: 7:15:52, 99.9%  Time with SpO2=>80 & <90: 0:00:24, 0.1%  Time with SpO2=>70 & <80: 0:00:00, 0.0%  Time with SpO2=>60 & <70: 0:00:00, 0.0%  The longest continuous time with saturation <=88 was 00:00:16, which started at  20 06:56:45.  A desaturation event was defined as a decrease of saturation by 4 or more.  No events were excluded due to artifact.  There were 8 desaturation events over 3 minutes duration.  There were 78 desaturation events of less than 3 minutes duration during which:  The mean high was 98.0%. The mean low was 92.4%.  The number of these events that were:  > 0 & <10 seconds: 3 > 0 seconds: 78  =>10 & <20 seconds: 17 =>10 seconds: 75  =>20 & <30 seconds: 32 =>20 seconds: 58  =>30 & <40 seconds: 6 =>30 seconds: 26  =>40 & <50 seconds: 7 =>40 seconds: 20  =>50 & <60 seconds: 1 =>50 seconds: 13  =>60 seconds: 12 =>60 seconds: 12  The mean length of desaturation events that were >=10 sec & <=3 mins was: 38.3 sec.  Desaturation event index (events >=10 sec per sampled hour): 10.3  Desaturation event index (events >= 0 sec per sampled hour): 10.7    OVERNIGHT OXIMETRY REPORT:    Dictated by: Jostin Arzate MD  Test date: 2020  Dictated on: 2020      Comment: This test was performed on Room Air     A desaturation event was  defined as a decrease of saturation by 4 or more.    REPORT SUMMARY  Total valid samplin:16:16   High SpO2: 99%    Low SpO2: 88%    Mean SpO2  95.1 %  Cumulative time with oxygen saturation less than 88% (TC88): 00:00:16    CLINICAL INTERPRETATION  Results are normal,  There is no significant nocturnal oxygen desaturation and  Clinical correlation is advised    Medicare Criteria Comments:   Oximetry test results suggest the patient does not fall under Medicare Group 1 Criteria. ( Arterial oxygen saturation at or below 88% for at least 5 minutes taken during sleep)  Jostin Arzate MD    Details about Medicare Group Criteria coverage can be found at http://www.cms.hhs.gov/manuals/downloads/

## 2020-02-27 NOTE — TELEPHONE ENCOUNTER
Spoke with pt wife. I informed her that pt will need to complete a walk and do an ABG on room air to before being taken off oxygen. Pt wife stated that once pt is feeling better she will call to schedule walk and ABG.

## 2020-02-27 NOTE — TELEPHONE ENCOUNTER
----- Message from Brenton Jacobson MD sent at 2/27/2020  5:49 AM CST -----  Please arrange 1 unit of blood transfusion today for hemoglobin less than 7.     I have placed orders in the Smart set.  Please call the patient and obtain consent as well

## 2020-02-28 ENCOUNTER — CARE AT HOME (OUTPATIENT)
Dept: HOME HEALTH SERVICES | Facility: CLINIC | Age: 70
End: 2020-02-28
Payer: MEDICARE

## 2020-02-28 VITALS
SYSTOLIC BLOOD PRESSURE: 145 MMHG | TEMPERATURE: 98 F | HEART RATE: 68 BPM | DIASTOLIC BLOOD PRESSURE: 65 MMHG | OXYGEN SATURATION: 98 % | RESPIRATION RATE: 18 BRPM

## 2020-02-28 DIAGNOSIS — E78.5 HYPERLIPIDEMIA, UNSPECIFIED HYPERLIPIDEMIA TYPE: Chronic | ICD-10-CM

## 2020-02-28 DIAGNOSIS — I10 ESSENTIAL HYPERTENSION: Chronic | ICD-10-CM

## 2020-02-28 DIAGNOSIS — J96.11 CHRONIC RESPIRATORY FAILURE WITH HYPOXIA: ICD-10-CM

## 2020-02-28 DIAGNOSIS — I25.10 CORONARY ARTERY DISEASE INVOLVING NATIVE CORONARY ARTERY OF NATIVE HEART WITHOUT ANGINA PECTORIS: ICD-10-CM

## 2020-02-28 DIAGNOSIS — R07.89 ATYPICAL CHEST PAIN: ICD-10-CM

## 2020-02-28 DIAGNOSIS — I20.0 UNSTABLE ANGINA: ICD-10-CM

## 2020-02-28 DIAGNOSIS — Z09 FOLLOW UP: Primary | ICD-10-CM

## 2020-02-28 LAB
CYSTATIN C SERPL-MCNC: 2.41 MG/L (ref 0.82–1.53)
GFR/BSA.PRED SERPLBLD CYS-BASED-ARV: 23 ML/MIN/BSA

## 2020-02-28 PROCEDURE — 99499 RISK ADDL DX/OHS AUDIT: ICD-10-PCS | Mod: S$GLB,,, | Performed by: NURSE PRACTITIONER

## 2020-02-28 PROCEDURE — 3077F SYST BP >= 140 MM HG: CPT | Mod: CPTII,S$GLB,, | Performed by: NURSE PRACTITIONER

## 2020-02-28 PROCEDURE — 99499 UNLISTED E&M SERVICE: CPT | Mod: S$GLB,,, | Performed by: NURSE PRACTITIONER

## 2020-02-28 PROCEDURE — 1101F PT FALLS ASSESS-DOCD LE1/YR: CPT | Mod: CPTII,S$GLB,, | Performed by: NURSE PRACTITIONER

## 2020-02-28 PROCEDURE — 3077F PR MOST RECENT SYSTOLIC BLOOD PRESSURE >= 140 MM HG: ICD-10-PCS | Mod: CPTII,S$GLB,, | Performed by: NURSE PRACTITIONER

## 2020-02-28 PROCEDURE — 99348 HOME/RES VST EST LOW MDM 30: CPT | Mod: ,,, | Performed by: NURSE PRACTITIONER

## 2020-02-28 PROCEDURE — 1159F PR MEDICATION LIST DOCUMENTED IN MEDICAL RECORD: ICD-10-PCS | Mod: S$GLB,,, | Performed by: NURSE PRACTITIONER

## 2020-02-28 PROCEDURE — 1126F AMNT PAIN NOTED NONE PRSNT: CPT | Mod: S$GLB,,, | Performed by: NURSE PRACTITIONER

## 2020-02-28 PROCEDURE — 3078F PR MOST RECENT DIASTOLIC BLOOD PRESSURE < 80 MM HG: ICD-10-PCS | Mod: CPTII,S$GLB,, | Performed by: NURSE PRACTITIONER

## 2020-02-28 PROCEDURE — 1126F PR PAIN SEVERITY QUANTIFIED, NO PAIN PRESENT: ICD-10-PCS | Mod: S$GLB,,, | Performed by: NURSE PRACTITIONER

## 2020-02-28 PROCEDURE — 3078F DIAST BP <80 MM HG: CPT | Mod: CPTII,S$GLB,, | Performed by: NURSE PRACTITIONER

## 2020-02-28 PROCEDURE — 99348 PR HOME VISIT,ESTAB PATIENT,LEVEL II: ICD-10-PCS | Mod: ,,, | Performed by: NURSE PRACTITIONER

## 2020-02-28 PROCEDURE — 1159F MED LIST DOCD IN RCRD: CPT | Mod: S$GLB,,, | Performed by: NURSE PRACTITIONER

## 2020-02-28 PROCEDURE — 1101F PR PT FALLS ASSESS DOC 0-1 FALLS W/OUT INJ PAST YR: ICD-10-PCS | Mod: CPTII,S$GLB,, | Performed by: NURSE PRACTITIONER

## 2020-02-28 RX ORDER — TRIAMCINOLONE ACETONIDE 1 MG/G
OINTMENT TOPICAL 2 TIMES DAILY
Qty: 1 TUBE | Refills: 11 | Status: SHIPPED | OUTPATIENT
Start: 2020-02-28 | End: 2020-03-26 | Stop reason: ALTCHOICE

## 2020-02-28 RX ORDER — FERROUS GLUCONATE 324(38)MG
324 TABLET ORAL 2 TIMES DAILY WITH MEALS
Qty: 180 TABLET | Refills: 3 | Status: SHIPPED | OUTPATIENT
Start: 2020-02-28 | End: 2020-05-19

## 2020-02-28 RX ORDER — FUROSEMIDE 20 MG/1
TABLET ORAL
Qty: 124 TABLET | Refills: 3 | Status: SHIPPED | OUTPATIENT
Start: 2020-02-28 | End: 2020-08-11

## 2020-02-28 RX ORDER — HYDRALAZINE HYDROCHLORIDE 25 MG/1
25 TABLET, FILM COATED ORAL EVERY 12 HOURS
Qty: 180 TABLET | Refills: 3 | Status: SHIPPED | OUTPATIENT
Start: 2020-02-28 | End: 2021-01-01 | Stop reason: ALTCHOICE

## 2020-02-28 RX ORDER — ISOSORBIDE MONONITRATE 60 MG/1
120 TABLET, EXTENDED RELEASE ORAL DAILY
Qty: 180 TABLET | Refills: 3
Start: 2020-02-28 | End: 2020-03-26 | Stop reason: SDUPTHER

## 2020-02-28 RX ORDER — ATORVASTATIN CALCIUM 40 MG/1
40 TABLET, FILM COATED ORAL DAILY
Qty: 90 TABLET | Refills: 3
Start: 2020-02-28 | End: 2020-03-26 | Stop reason: SDUPTHER

## 2020-02-29 NOTE — PATIENT INSTRUCTIONS
- [facility] Nurse Practitioner to schedule home follow-up visit with patient in 4 weeks.  - Continue all medications, treatments and therapies as ordered.   - Follow all instructions, recommendations as discussed.  - Maintain Safety Precautions at all times.  - Attend all medical appointments as scheduled.  - For worsening symptoms: call Primary Care Physician or Nurse Practitioner.  - For emergencies, call 911 or immediately report to the nearest emergency room.

## 2020-03-11 ENCOUNTER — HOSPITAL ENCOUNTER (INPATIENT)
Facility: HOSPITAL | Age: 70
LOS: 6 days | Discharge: HOME HEALTH | DRG: 389 | End: 2020-03-17
Attending: EMERGENCY MEDICINE | Admitting: INTERNAL MEDICINE
Payer: MEDICARE

## 2020-03-11 DIAGNOSIS — R10.84 ABDOMINAL PAIN, ACUTE, GENERALIZED: ICD-10-CM

## 2020-03-11 DIAGNOSIS — N18.9 CHRONIC RENAL IMPAIRMENT, UNSPECIFIED CKD STAGE: ICD-10-CM

## 2020-03-11 DIAGNOSIS — K56.609 SBO (SMALL BOWEL OBSTRUCTION): Primary | ICD-10-CM

## 2020-03-11 DIAGNOSIS — Z97.8 NASOGASTRIC TUBE PRESENT: ICD-10-CM

## 2020-03-11 DIAGNOSIS — D64.9 ANEMIA, UNSPECIFIED TYPE: ICD-10-CM

## 2020-03-11 LAB
ALBUMIN SERPL BCP-MCNC: 3.7 G/DL (ref 3.5–5.2)
ALP SERPL-CCNC: 196 U/L (ref 55–135)
ALT SERPL W/O P-5'-P-CCNC: 13 U/L (ref 10–44)
ANION GAP SERPL CALC-SCNC: 16 MMOL/L (ref 8–16)
AST SERPL-CCNC: 19 U/L (ref 10–40)
BASOPHILS # BLD AUTO: 0.04 K/UL (ref 0–0.2)
BASOPHILS NFR BLD: 0.2 % (ref 0–1.9)
BILIRUB SERPL-MCNC: 0.4 MG/DL (ref 0.1–1)
BNP SERPL-MCNC: 352 PG/ML (ref 0–99)
BUN SERPL-MCNC: 39 MG/DL (ref 8–23)
CALCIUM SERPL-MCNC: 10 MG/DL (ref 8.7–10.5)
CHLORIDE SERPL-SCNC: 105 MMOL/L (ref 95–110)
CO2 SERPL-SCNC: 18 MMOL/L (ref 23–29)
CREAT SERPL-MCNC: 2.8 MG/DL (ref 0.5–1.4)
DIFFERENTIAL METHOD: ABNORMAL
EOSINOPHIL # BLD AUTO: 0.2 K/UL (ref 0–0.5)
EOSINOPHIL NFR BLD: 1.2 % (ref 0–8)
ERYTHROCYTE [DISTWIDTH] IN BLOOD BY AUTOMATED COUNT: 16.8 % (ref 11.5–14.5)
EST. GFR  (AFRICAN AMERICAN): 25 ML/MIN/1.73 M^2
EST. GFR  (NON AFRICAN AMERICAN): 22 ML/MIN/1.73 M^2
GLUCOSE SERPL-MCNC: 144 MG/DL (ref 70–110)
HCT VFR BLD AUTO: 28.4 % (ref 40–54)
HGB BLD-MCNC: 8.8 G/DL (ref 14–18)
IMM GRANULOCYTES # BLD AUTO: 0.07 K/UL (ref 0–0.04)
IMM GRANULOCYTES NFR BLD AUTO: 0.4 % (ref 0–0.5)
LIPASE SERPL-CCNC: 10 U/L (ref 4–60)
LYMPHOCYTES # BLD AUTO: 0.6 K/UL (ref 1–4.8)
LYMPHOCYTES NFR BLD: 3.6 % (ref 18–48)
MAGNESIUM SERPL-MCNC: 2.2 MG/DL (ref 1.6–2.6)
MCH RBC QN AUTO: 28.2 PG (ref 27–31)
MCHC RBC AUTO-ENTMCNC: 31 G/DL (ref 32–36)
MCV RBC AUTO: 91 FL (ref 82–98)
MONOCYTES # BLD AUTO: 0.7 K/UL (ref 0.3–1)
MONOCYTES NFR BLD: 4.2 % (ref 4–15)
NEUTROPHILS # BLD AUTO: 15.1 K/UL (ref 1.8–7.7)
NEUTROPHILS NFR BLD: 90.4 % (ref 38–73)
NRBC BLD-RTO: 0 /100 WBC
PHOSPHATE SERPL-MCNC: 4.4 MG/DL (ref 2.7–4.5)
PLATELET # BLD AUTO: 383 K/UL (ref 150–350)
PMV BLD AUTO: 9.6 FL (ref 9.2–12.9)
POTASSIUM SERPL-SCNC: 4.9 MMOL/L (ref 3.5–5.1)
PROT SERPL-MCNC: 8.5 G/DL (ref 6–8.4)
RBC # BLD AUTO: 3.12 M/UL (ref 4.6–6.2)
SODIUM SERPL-SCNC: 139 MMOL/L (ref 136–145)
TROPONIN I SERPL DL<=0.01 NG/ML-MCNC: 0.01 NG/ML (ref 0–0.03)
WBC # BLD AUTO: 16.72 K/UL (ref 3.9–12.7)

## 2020-03-11 PROCEDURE — 36415 COLL VENOUS BLD VENIPUNCTURE: CPT | Mod: HCNC

## 2020-03-11 PROCEDURE — 93010 ELECTROCARDIOGRAM REPORT: CPT | Mod: HCNC,,, | Performed by: INTERNAL MEDICINE

## 2020-03-11 PROCEDURE — 83735 ASSAY OF MAGNESIUM: CPT | Mod: HCNC

## 2020-03-11 PROCEDURE — 99285 EMERGENCY DEPT VISIT HI MDM: CPT | Mod: 25,HCNC

## 2020-03-11 PROCEDURE — 11000001 HC ACUTE MED/SURG PRIVATE ROOM: Mod: HCNC

## 2020-03-11 PROCEDURE — 83880 ASSAY OF NATRIURETIC PEPTIDE: CPT | Mod: HCNC

## 2020-03-11 PROCEDURE — 84484 ASSAY OF TROPONIN QUANT: CPT | Mod: HCNC

## 2020-03-11 PROCEDURE — 83690 ASSAY OF LIPASE: CPT | Mod: HCNC

## 2020-03-11 PROCEDURE — 84100 ASSAY OF PHOSPHORUS: CPT | Mod: HCNC

## 2020-03-11 PROCEDURE — 93005 ELECTROCARDIOGRAM TRACING: CPT | Mod: HCNC

## 2020-03-11 PROCEDURE — 85025 COMPLETE CBC W/AUTO DIFF WBC: CPT | Mod: HCNC

## 2020-03-11 PROCEDURE — 93010 EKG 12-LEAD: ICD-10-PCS | Mod: HCNC,,, | Performed by: INTERNAL MEDICINE

## 2020-03-11 PROCEDURE — 80053 COMPREHEN METABOLIC PANEL: CPT | Mod: HCNC

## 2020-03-12 PROBLEM — K56.609 SBO (SMALL BOWEL OBSTRUCTION): Status: ACTIVE | Noted: 2020-03-12

## 2020-03-12 LAB
ALBUMIN SERPL BCP-MCNC: 3.6 G/DL (ref 3.5–5.2)
ALP SERPL-CCNC: 191 U/L (ref 55–135)
ALT SERPL W/O P-5'-P-CCNC: 14 U/L (ref 10–44)
ANION GAP SERPL CALC-SCNC: 10 MMOL/L (ref 8–16)
AST SERPL-CCNC: 20 U/L (ref 10–40)
BACTERIA #/AREA URNS HPF: ABNORMAL /HPF
BASOPHILS # BLD AUTO: 0.04 K/UL (ref 0–0.2)
BASOPHILS NFR BLD: 0.4 % (ref 0–1.9)
BILIRUB SERPL-MCNC: 0.3 MG/DL (ref 0.1–1)
BILIRUB UR QL STRIP: NEGATIVE
BUN SERPL-MCNC: 43 MG/DL (ref 8–23)
CALCIUM SERPL-MCNC: 9.8 MG/DL (ref 8.7–10.5)
CHLORIDE SERPL-SCNC: 106 MMOL/L (ref 95–110)
CLARITY UR: CLEAR
CO2 SERPL-SCNC: 24 MMOL/L (ref 23–29)
COLOR UR: YELLOW
CREAT SERPL-MCNC: 2.9 MG/DL (ref 0.5–1.4)
DIFFERENTIAL METHOD: ABNORMAL
EOSINOPHIL # BLD AUTO: 0.3 K/UL (ref 0–0.5)
EOSINOPHIL NFR BLD: 2.5 % (ref 0–8)
ERYTHROCYTE [DISTWIDTH] IN BLOOD BY AUTOMATED COUNT: 16.9 % (ref 11.5–14.5)
EST. GFR  (AFRICAN AMERICAN): 24 ML/MIN/1.73 M^2
EST. GFR  (NON AFRICAN AMERICAN): 21 ML/MIN/1.73 M^2
GLUCOSE SERPL-MCNC: 111 MG/DL (ref 70–110)
GLUCOSE UR QL STRIP: NEGATIVE
HCT VFR BLD AUTO: 27.9 % (ref 40–54)
HGB BLD-MCNC: 8.1 G/DL (ref 14–18)
HGB UR QL STRIP: ABNORMAL
HYALINE CASTS #/AREA URNS LPF: 0 /LPF
IMM GRANULOCYTES # BLD AUTO: 0.05 K/UL (ref 0–0.04)
IMM GRANULOCYTES NFR BLD AUTO: 0.5 % (ref 0–0.5)
KETONES UR QL STRIP: NEGATIVE
LEUKOCYTE ESTERASE UR QL STRIP: ABNORMAL
LYMPHOCYTES # BLD AUTO: 1 K/UL (ref 1–4.8)
LYMPHOCYTES NFR BLD: 10.1 % (ref 18–48)
MCH RBC QN AUTO: 27.5 PG (ref 27–31)
MCHC RBC AUTO-ENTMCNC: 29 G/DL (ref 32–36)
MCV RBC AUTO: 95 FL (ref 82–98)
MICROSCOPIC COMMENT: ABNORMAL
MONOCYTES # BLD AUTO: 0.7 K/UL (ref 0.3–1)
MONOCYTES NFR BLD: 6.4 % (ref 4–15)
NEUTROPHILS # BLD AUTO: 8.2 K/UL (ref 1.8–7.7)
NEUTROPHILS NFR BLD: 80.1 % (ref 38–73)
NITRITE UR QL STRIP: NEGATIVE
NRBC BLD-RTO: 0 /100 WBC
PH UR STRIP: 6 [PH] (ref 5–8)
PLATELET # BLD AUTO: 297 K/UL (ref 150–350)
PMV BLD AUTO: 9.4 FL (ref 9.2–12.9)
POTASSIUM SERPL-SCNC: 5.3 MMOL/L (ref 3.5–5.1)
PROT SERPL-MCNC: 8.1 G/DL (ref 6–8.4)
PROT UR QL STRIP: ABNORMAL
RBC # BLD AUTO: 2.95 M/UL (ref 4.6–6.2)
RBC #/AREA URNS HPF: 0 /HPF (ref 0–4)
SODIUM SERPL-SCNC: 140 MMOL/L (ref 136–145)
SP GR UR STRIP: 1.02 (ref 1–1.03)
URN SPEC COLLECT METH UR: ABNORMAL
UROBILINOGEN UR STRIP-ACNC: NEGATIVE EU/DL
WBC # BLD AUTO: 10.26 K/UL (ref 3.9–12.7)
WBC #/AREA URNS HPF: 30 /HPF (ref 0–5)

## 2020-03-12 PROCEDURE — 25000003 PHARM REV CODE 250: Mod: HCNC | Performed by: FAMILY MEDICINE

## 2020-03-12 PROCEDURE — 85025 COMPLETE CBC W/AUTO DIFF WBC: CPT | Mod: HCNC

## 2020-03-12 PROCEDURE — 11000001 HC ACUTE MED/SURG PRIVATE ROOM: Mod: HCNC

## 2020-03-12 PROCEDURE — 87088 URINE BACTERIA CULTURE: CPT | Mod: HCNC

## 2020-03-12 PROCEDURE — 63600175 PHARM REV CODE 636 W HCPCS: Mod: HCNC | Performed by: INTERNAL MEDICINE

## 2020-03-12 PROCEDURE — 99232 PR SUBSEQUENT HOSPITAL CARE,LEVL II: ICD-10-PCS | Mod: HCNC,,, | Performed by: COLON & RECTAL SURGERY

## 2020-03-12 PROCEDURE — 25500020 PHARM REV CODE 255: Mod: HCNC | Performed by: INTERNAL MEDICINE

## 2020-03-12 PROCEDURE — 96361 HYDRATE IV INFUSION ADD-ON: CPT

## 2020-03-12 PROCEDURE — 99223 PR INITIAL HOSPITAL CARE,LEVL III: ICD-10-PCS | Mod: HCNC,,, | Performed by: INTERNAL MEDICINE

## 2020-03-12 PROCEDURE — 87086 URINE CULTURE/COLONY COUNT: CPT | Mod: HCNC

## 2020-03-12 PROCEDURE — 63600175 PHARM REV CODE 636 W HCPCS: Mod: HCNC | Performed by: EMERGENCY MEDICINE

## 2020-03-12 PROCEDURE — 63600175 PHARM REV CODE 636 W HCPCS: Mod: HCNC | Performed by: NURSE PRACTITIONER

## 2020-03-12 PROCEDURE — 81000 URINALYSIS NONAUTO W/SCOPE: CPT | Mod: HCNC

## 2020-03-12 PROCEDURE — 87186 SC STD MICRODIL/AGAR DIL: CPT | Mod: HCNC

## 2020-03-12 PROCEDURE — 99232 SBSQ HOSP IP/OBS MODERATE 35: CPT | Mod: HCNC,,, | Performed by: COLON & RECTAL SURGERY

## 2020-03-12 PROCEDURE — 99223 1ST HOSP IP/OBS HIGH 75: CPT | Mod: HCNC,,, | Performed by: INTERNAL MEDICINE

## 2020-03-12 PROCEDURE — S0028 INJECTION, FAMOTIDINE, 20 MG: HCPCS | Mod: HCNC | Performed by: FAMILY MEDICINE

## 2020-03-12 PROCEDURE — 63600175 PHARM REV CODE 636 W HCPCS: Mod: HCNC | Performed by: FAMILY MEDICINE

## 2020-03-12 PROCEDURE — 96375 TX/PRO/DX INJ NEW DRUG ADDON: CPT

## 2020-03-12 PROCEDURE — 80053 COMPREHEN METABOLIC PANEL: CPT | Mod: HCNC

## 2020-03-12 PROCEDURE — 87077 CULTURE AEROBIC IDENTIFY: CPT | Mod: HCNC

## 2020-03-12 PROCEDURE — 96374 THER/PROPH/DIAG INJ IV PUSH: CPT

## 2020-03-12 PROCEDURE — 36415 COLL VENOUS BLD VENIPUNCTURE: CPT | Mod: HCNC

## 2020-03-12 RX ORDER — ONDANSETRON 2 MG/ML
4 INJECTION INTRAMUSCULAR; INTRAVENOUS EVERY 6 HOURS PRN
Status: DISCONTINUED | OUTPATIENT
Start: 2020-03-12 | End: 2020-03-17 | Stop reason: HOSPADM

## 2020-03-12 RX ORDER — IBUPROFEN 200 MG
24 TABLET ORAL
Status: DISCONTINUED | OUTPATIENT
Start: 2020-03-12 | End: 2020-03-17 | Stop reason: HOSPADM

## 2020-03-12 RX ORDER — FAMOTIDINE 20 MG/50ML
20 INJECTION, SOLUTION INTRAVENOUS 2 TIMES DAILY
Status: DISCONTINUED | OUTPATIENT
Start: 2020-03-12 | End: 2020-03-12

## 2020-03-12 RX ORDER — HYDRALAZINE HYDROCHLORIDE 20 MG/ML
10 INJECTION INTRAMUSCULAR; INTRAVENOUS EVERY 6 HOURS PRN
Status: DISCONTINUED | OUTPATIENT
Start: 2020-03-12 | End: 2020-03-17 | Stop reason: HOSPADM

## 2020-03-12 RX ORDER — MEPERIDINE HYDROCHLORIDE 25 MG/ML
12.5 INJECTION INTRAMUSCULAR; INTRAVENOUS; SUBCUTANEOUS EVERY 4 HOURS PRN
Status: DISPENSED | OUTPATIENT
Start: 2020-03-12 | End: 2020-03-13

## 2020-03-12 RX ORDER — ONDANSETRON 2 MG/ML
4 INJECTION INTRAMUSCULAR; INTRAVENOUS
Status: COMPLETED | OUTPATIENT
Start: 2020-03-12 | End: 2020-03-12

## 2020-03-12 RX ORDER — FAMOTIDINE 20 MG/50ML
20 INJECTION, SOLUTION INTRAVENOUS DAILY
Status: DISCONTINUED | OUTPATIENT
Start: 2020-03-12 | End: 2020-03-17 | Stop reason: HOSPADM

## 2020-03-12 RX ORDER — GLUCAGON 1 MG
1 KIT INJECTION
Status: DISCONTINUED | OUTPATIENT
Start: 2020-03-12 | End: 2020-03-17 | Stop reason: HOSPADM

## 2020-03-12 RX ORDER — CEFEPIME HYDROCHLORIDE 1 G/50ML
1 INJECTION, SOLUTION INTRAVENOUS
Status: DISCONTINUED | OUTPATIENT
Start: 2020-03-13 | End: 2020-03-14

## 2020-03-12 RX ORDER — CEFEPIME HYDROCHLORIDE 1 G/50ML
1 INJECTION, SOLUTION INTRAVENOUS
Status: DISCONTINUED | OUTPATIENT
Start: 2020-03-12 | End: 2020-03-12 | Stop reason: DRUGHIGH

## 2020-03-12 RX ORDER — MEPERIDINE HYDROCHLORIDE 25 MG/ML
12.5 INJECTION INTRAMUSCULAR; INTRAVENOUS; SUBCUTANEOUS ONCE
Status: COMPLETED | OUTPATIENT
Start: 2020-03-12 | End: 2020-03-12

## 2020-03-12 RX ORDER — SODIUM CHLORIDE 9 MG/ML
INJECTION, SOLUTION INTRAVENOUS CONTINUOUS
Status: DISCONTINUED | OUTPATIENT
Start: 2020-03-12 | End: 2020-03-17 | Stop reason: HOSPADM

## 2020-03-12 RX ORDER — SODIUM CHLORIDE 0.9 % (FLUSH) 0.9 %
10 SYRINGE (ML) INJECTION
Status: DISCONTINUED | OUTPATIENT
Start: 2020-03-12 | End: 2020-03-17 | Stop reason: HOSPADM

## 2020-03-12 RX ORDER — IPRATROPIUM BROMIDE AND ALBUTEROL SULFATE 2.5; .5 MG/3ML; MG/3ML
3 SOLUTION RESPIRATORY (INHALATION) EVERY 8 HOURS PRN
Status: DISCONTINUED | OUTPATIENT
Start: 2020-03-12 | End: 2020-03-17 | Stop reason: HOSPADM

## 2020-03-12 RX ORDER — IBUPROFEN 200 MG
16 TABLET ORAL
Status: DISCONTINUED | OUTPATIENT
Start: 2020-03-12 | End: 2020-03-17 | Stop reason: HOSPADM

## 2020-03-12 RX ADMIN — HYDRALAZINE HYDROCHLORIDE 10 MG: 20 INJECTION INTRAMUSCULAR; INTRAVENOUS at 04:03

## 2020-03-12 RX ADMIN — MEPERIDINE HYDROCHLORIDE 12.5 MG: 25 INJECTION INTRAMUSCULAR; INTRAVENOUS; SUBCUTANEOUS at 11:03

## 2020-03-12 RX ADMIN — PROMETHAZINE HYDROCHLORIDE 6.25 MG: 25 INJECTION INTRAMUSCULAR; INTRAVENOUS at 06:03

## 2020-03-12 RX ADMIN — CEFEPIME HYDROCHLORIDE 1 G: 1 INJECTION, SOLUTION INTRAVENOUS at 07:03

## 2020-03-12 RX ADMIN — FAMOTIDINE 20 MG: 20 INJECTION, SOLUTION INTRAVENOUS at 03:03

## 2020-03-12 RX ADMIN — IOHEXOL 30 ML: 350 INJECTION, SOLUTION INTRAVENOUS at 12:03

## 2020-03-12 RX ADMIN — ONDANSETRON 4 MG: 2 INJECTION INTRAMUSCULAR; INTRAVENOUS at 01:03

## 2020-03-12 RX ADMIN — MEPERIDINE HYDROCHLORIDE 12.5 MG: 25 INJECTION INTRAMUSCULAR; INTRAVENOUS; SUBCUTANEOUS at 01:03

## 2020-03-12 RX ADMIN — MEPERIDINE HYDROCHLORIDE 12.5 MG: 25 INJECTION INTRAMUSCULAR; INTRAVENOUS; SUBCUTANEOUS at 06:03

## 2020-03-12 RX ADMIN — HYDRALAZINE HYDROCHLORIDE 10 MG: 20 INJECTION INTRAMUSCULAR; INTRAVENOUS at 10:03

## 2020-03-12 RX ADMIN — ONDANSETRON 4 MG: 2 INJECTION INTRAMUSCULAR; INTRAVENOUS at 06:03

## 2020-03-12 RX ADMIN — SODIUM CHLORIDE: 0.9 INJECTION, SOLUTION INTRAVENOUS at 12:03

## 2020-03-12 NOTE — ASSESSMENT & PLAN NOTE
1. CKD stage 4 :  Patient baseline serum creatinine fluctuates between 2 and 3 mg/dL, currently at his baseline, CT scan of the abdomen shows mild hydronephrosis on the left side, patient has history of ureteric stricture and stent placement on that side, also has nonobstructing stones bilaterally, monitor renal function closely, avoid Ace inhibitors/ARB/NSAIDs at this time, continue gentle hydration as he is on NPO status,    2.  Hypertension - blood pressure is slightly elevated, will add IV hydralazine coverage,    3.  Small bowel obstruction - management per General surgery    4.  Anemia - multifactorial, monitor hemoglobin, PRBC transfusion when indicated,    5. Hyperkalemia - mild, will monitor

## 2020-03-12 NOTE — ED NOTES
Patient with max volume of 140ml on bladder scanner. Provided patient with urinal to try and obtain UA specimen.

## 2020-03-12 NOTE — SUBJECTIVE & OBJECTIVE
Past Medical History:   Diagnosis Date    Acute on chronic congestive heart failure 1/13/2020    Acute respiratory failure with hypoxia 1/14/2020    Analgesic nephropathy     Anemia     AP (angina pectoris) 1/11/2019    Arthritis     Colon polyp     Repeat colonoscopy due in 9/14    COPD exacerbation 2/6/2020    Coronary artery disease     Diverticulosis     colonoscopy 2/21/2014    Dysthymia 2/13/2020    Encounter for blood transfusion     GERD (gastroesophageal reflux disease)     Hemorrhoids     colonoscopy 2/21/2014    Horseshoe kidney     Hyperglycemia 3/17/2014    Hyperlipidemia     Hypertension     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    Late complications of amputation stump     rseolved with further amputation( MRSA then none since 2014)    Lipoma of colon     colonoscopy 2/21/2014    Myocardial infarction     per patient 2000 & 9/2012    Peripheral vascular disease     Phantom limb syndrome     patient reports only intermittent not problematic, not worsening    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    Spinal cord disease     L4L5 disc    Stroke     Tobacco dependence     resolved    Ureteral stent retained        Past Surgical History:   Procedure Laterality Date    ABDOMINAL AORTIC ANEURYSM REPAIR      ABDOMINAL AORTIC ANEURYSM REPAIR  1996/2014    AMPUTATION, LOWER LIMB      AORTA - BILATERAL FEMORAL ARTERY BYPASS GRAFT  2014    Left and right leg    CORONARY ANGIOPLASTY WITH STENT PLACEMENT  2000    Three placed in heart    CYSTOSCOPY W/ RETROGRADES Left 5/29/2018    Procedure: CYSTOSCOPY, WITH RETROGRADE PYELOGRAM;  Surgeon: Scooter Jin IV, MD;  Location: [facility] OR;  Service: Urology;  Laterality: Left;    CYSTOSCOPY W/ URETERAL STENT PLACEMENT Left 5/29/2018    Procedure: CYSTOSCOPY, WITH URETERAL STENT INSERTION;  Surgeon: Scooter Jin IV, MD;  Location: [facility] OR;  Service: Urology;  Laterality: Left;    CYSTOSCOPY W/ URETERAL STENT PLACEMENT Left  2/4/2020    Procedure: CYSTOSCOPY, WITH URETERAL STENT INSERTION;  Surgeon: Scooter Jin IV, MD;  Location: [facility] OR;  Service: Urology;  Laterality: Left;    CYSTOSCOPY W/ URETERAL STENT REMOVAL Left 5/29/2018    Procedure: CYSTOSCOPY, WITH URETERAL STENT REMOVAL;  Surgeon: Scooter Jin IV, MD;  Location: [facility] OR;  Service: Urology;  Laterality: Left;    CYSTOSCOPY W/ URETERAL STENT REMOVAL Left 2/4/2020    Procedure: CYSTOSCOPY, WITH URETERAL STENT REMOVAL;  Surgeon: Scooter Jin IV, MD;  Location: [facility] OR;  Service: Urology;  Laterality: Left;    FOOT AMPUTATION THROUGH METATARSAL  1996    left    FOOT SURGERY Bilateral 1980's    per patient multiple toe amputations prior to.  partial foot amputation:first great toe then other toes     KIDNEY SURGERY  2014    per patient separation of horseshoe kidney @ time of AAA repair    LEFT HEART CATHETERIZATION Left 3/7/2019    Procedure: CATHETERIZATION, HEART, LEFT;  Surgeon: Adriel Boone MD;  Location: [facility] CATH LAB;  Service: Cardiology;  Laterality: Left;  630 admit for IV hydration  10am start    LUNG LOBECTOMY Right 1970s    per patient not cancer    right below knee amputation  2009 (approx)    SMALL INTESTINE SURGERY  2014    per patient partial @ time of aaa repair  not small bowel - large bowel bowel compromised bythtwe AAAbowel    TONSILLECTOMY  1955 aprox    URETERAL STENT PLACEMENT Left     annually replaced since 2012 or so  Dr Jin       Review of patient's allergies indicates:   Allergen Reactions    Morphine Itching     Current Facility-Administered Medications   Medication Frequency    0.9%  NaCl infusion Continuous    albuterol-ipratropium 2.5 mg-0.5 mg/3 mL nebulizer solution 3 mL Q8H PRN    [START ON 3/13/2020] cefepime in dextrose 5 % 1 gram/50 mL IVPB 1 g Q24H    dextrose 10% (D10W) Bolus PRN    dextrose 10% (D10W) Bolus PRN    famotidine IVPB 20 mg Daily    glucagon (human recombinant) injection 1 mg PRN     glucose chewable tablet 16 g PRN    glucose chewable tablet 24 g PRN    hydrALAZINE injection 10 mg Q6H PRN    meperidine (PF) injection 12.5 mg Q4H PRN    ondansetron injection 4 mg Q6H PRN    promethazine (PHENERGAN) 6.25 mg in dextrose 5 % 50 mL IVPB Q6H PRN    sodium chloride 0.9% flush 10 mL PRN     Family History     Problem Relation (Age of Onset)    COPD Mother    Cancer Mother    Diabetes Daughter    Heart disease Father        Tobacco Use    Smoking status: Former Smoker     Packs/day: 1.00     Years: 15.00     Pack years: 15.00     Last attempt to quit: 2009     Years since quittin.2    Smokeless tobacco: Never Used   Substance and Sexual Activity    Alcohol use: No    Drug use: No     Comment: Is on prescription opiod, no non prescribed use    Sexual activity: Not Currently     Partners: Female     Review of Systems   Constitutional: Positive for activity change and appetite change.   HENT: Negative for congestion and facial swelling.    Eyes: Negative for pain, discharge and redness.   Respiratory: Negative for apnea, cough and chest tightness.    Cardiovascular: Negative for chest pain, palpitations and leg swelling.   Gastrointestinal: Positive for abdominal pain. Negative for abdominal distention.   Genitourinary: Negative for difficulty urinating, dysuria and frequency.   Musculoskeletal: Negative for neck pain and neck stiffness.   Skin: Negative for color change, rash and wound.   Neurological: Positive for weakness. Negative for dizziness and numbness.   Psychiatric/Behavioral: Negative for sleep disturbance.   All other systems reviewed and are negative.    Objective:     Vital Signs (Most Recent):  Temp: 98 °F (36.7 °C) (20 1135)  Pulse: 78 (20 1135)  Resp: 18 (20 1135)  BP: (!) 174/81 (20 1135)  SpO2: 96 % (20 1135)  O2 Device (Oxygen Therapy): room air (20 0800) Vital Signs (24h Range):  Temp:  [97.5 °F (36.4 °C)-98 °F (36.7 °C)] 98 °F  (36.7 °C)  Pulse:  [72-85] 78  Resp:  [11-20] 18  SpO2:  [94 %-100 %] 96 %  BP: (130-179)/(73-86) 174/81     Weight: 73.3 kg (161 lb 11.2 oz) (03/11/20 2206)  Body mass index is 21.33 kg/m².  Body surface area is 1.94 meters squared.    I/O last 3 completed shifts:  In: -   Out: 300 [Drains:300]    Physical Exam   Constitutional: He is oriented to person, place, and time. He appears well-developed. No distress.   HENT:   Head: Normocephalic and atraumatic.   OGT in place   Eyes: Pupils are equal, round, and reactive to light.   Neck: Normal range of motion. Neck supple. No tracheal deviation present. No thyromegaly present.   Cardiovascular: Normal rate, regular rhythm, normal heart sounds and intact distal pulses. Exam reveals no gallop and no friction rub.   No murmur heard.  Pulmonary/Chest: Effort normal and breath sounds normal. He has no wheezes. He has no rales.   Abdominal: Soft. He exhibits no mass. There is tenderness. There is no rebound and no guarding.   Musculoskeletal: Normal range of motion. He exhibits no edema.   Lymphadenopathy:     He has no cervical adenopathy.   Neurological: He is alert and oriented to person, place, and time.   Skin: Skin is warm. No rash noted. He is not diaphoretic. No erythema.   Nursing note and vitals reviewed.      Significant Labs:  CBC:   Recent Labs   Lab 03/12/20  1043   WBC 10.26   RBC 2.95*   HGB 8.1*   HCT 27.9*      MCV 95   MCH 27.5   MCHC 29.0*     CMP:   Recent Labs   Lab 03/12/20  1043   *   CALCIUM 9.8   ALBUMIN 3.6   PROT 8.1      K 5.3*   CO2 24      BUN 43*   CREATININE 2.9*   ALKPHOS 191*   ALT 14   AST 20   BILITOT 0.3     Coagulation: No results for input(s): PT, INR, APTT in the last 168 hours.  LFTs:   Recent Labs   Lab 03/12/20  1043   ALT 14   AST 20   ALKPHOS 191*   BILITOT 0.3   PROT 8.1   ALBUMIN 3.6     All labs within the past 24 hours have been reviewed.    Significant Imaging:  Reviewed    Lab Results   Component  Value Date    .0 (H) 02/26/2020    CALCIUM 9.8 03/12/2020    PHOS 4.4 03/11/2020

## 2020-03-12 NOTE — ED NOTES
Pt. Resting in bed. No acute distress, RR equal and non labored, VSS. Bed in low and locked position with call light in reach. Side rails up X 2. Family at bedside. Patient on continuous pulse ox, automatic blood pressure cuff and cardiac monitor. Patient denies any needs at this time. Will continue to monitor.

## 2020-03-12 NOTE — ED PROVIDER NOTES
SCRIBE #1 NOTE: I, Maria Teresa Armstrong, am scribing for, and in the presence of, Ricardo Red MD. I have scribed the entire note.       History     Chief Complaint   Patient presents with    Abdominal Pain     N/V and lower abd pain x 1 day, difficult urination. received 4 mg zofran en route.      Review of patient's allergies indicates:   Allergen Reactions    Morphine Itching         History of Present Illness     HPI    3/11/2020, 10:15 PM  History obtained from the patient      History of Present Illness: Kodi Ribeiro is a 70 y.o. male patient with a PMHx of anemia, GERD, HTN, HLD, MI who presents to the Emergency Department for evaluation of lower abdominal pain which onset gradually yesterday. Symptoms are constant and moderate in severity. No mitigating or exacerbating factors reported. Associated sxs include N/V and decreased urine output. Patient denies any fever, chills, dysuria, hematuria, hematochezia, constipation, diarrhea, frequency, urgency, and all other sxs at this time. No further complaints or concerns at this time.         Arrival mode: AASI    PCP: Norma Nuñez MD        Past Medical History:  Past Medical History:   Diagnosis Date    Acute on chronic congestive heart failure 1/13/2020    Acute respiratory failure with hypoxia 1/14/2020    Analgesic nephropathy     Anemia     AP (angina pectoris) 1/11/2019    Arthritis     Colon polyp     Repeat colonoscopy due in 9/14    COPD exacerbation 2/6/2020    Coronary artery disease     Diverticulosis     colonoscopy 2/21/2014    Dysthymia 2/13/2020    Encounter for blood transfusion     GERD (gastroesophageal reflux disease)     Hemorrhoids     colonoscopy 2/21/2014    Horseshoe kidney     Hyperglycemia 3/17/2014    Hyperlipidemia     Hypertension     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    Late complications of amputation stump     rseolved with further amputation( MRSA then none since 2014)    Lipoma of colon     colonoscopy  2/21/2014    Myocardial infarction     per patient 2000 & 9/2012    Peripheral vascular disease     Phantom limb syndrome     patient reports only intermittent not problematic, not worsening    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    Spinal cord disease     L4L5 disc    Stroke     Tobacco dependence     resolved    Ureteral stent retained        Past Surgical History:  Past Surgical History:   Procedure Laterality Date    ABDOMINAL AORTIC ANEURYSM REPAIR      ABDOMINAL AORTIC ANEURYSM REPAIR  1996/2014    AMPUTATION, LOWER LIMB      AORTA - BILATERAL FEMORAL ARTERY BYPASS GRAFT  2014    Left and right leg    CORONARY ANGIOPLASTY WITH STENT PLACEMENT  2000    Three placed in heart    CYSTOSCOPY W/ RETROGRADES Left 5/29/2018    Procedure: CYSTOSCOPY, WITH RETROGRADE PYELOGRAM;  Surgeon: Scooter Jin IV, MD;  Location: [facility];  Service: Urology;  Laterality: Left;    CYSTOSCOPY W/ URETERAL STENT PLACEMENT Left 5/29/2018    Procedure: CYSTOSCOPY, WITH URETERAL STENT INSERTION;  Surgeon: Scooter Jin IV, MD;  Location: [facility] OR;  Service: Urology;  Laterality: Left;    CYSTOSCOPY W/ URETERAL STENT PLACEMENT Left 2/4/2020    Procedure: CYSTOSCOPY, WITH URETERAL STENT INSERTION;  Surgeon: Scooter Jin IV, MD;  Location: [facility] OR;  Service: Urology;  Laterality: Left;    CYSTOSCOPY W/ URETERAL STENT REMOVAL Left 5/29/2018    Procedure: CYSTOSCOPY, WITH URETERAL STENT REMOVAL;  Surgeon: Scooter Jin IV, MD;  Location: [facility] OR;  Service: Urology;  Laterality: Left;    CYSTOSCOPY W/ URETERAL STENT REMOVAL Left 2/4/2020    Procedure: CYSTOSCOPY, WITH URETERAL STENT REMOVAL;  Surgeon: Scooter Jin IV, MD;  Location: [facility];  Service: Urology;  Laterality: Left;    FOOT AMPUTATION THROUGH METATARSAL  1996    left    FOOT SURGERY Bilateral 1980's    per patient multiple toe amputations prior to.  partial foot amputation:first great toe then other toes     KIDNEY SURGERY  2014     per patient separation of horseshoe kidney @ time of AAA repair    LEFT HEART CATHETERIZATION Left 3/7/2019    Procedure: CATHETERIZATION, HEART, LEFT;  Surgeon: Adriel Boone MD;  Location: [facility] CATH LAB;  Service: Cardiology;  Laterality: Left;  630 admit for IV hydration  10am start    LUNG LOBECTOMY Right     per patient not cancer    right below knee amputation   (approx)    SMALL INTESTINE SURGERY      per patient partial @ time of aaa repair  not small bowel - large bowel bowel compromised bythtwe AAAbowel    TONSILLECTOMY   aprox    URETERAL STENT PLACEMENT Left     annually replaced since  or so  Dr Jin         Family History:  Family History   Problem Relation Age of Onset    Cancer Mother         lung    COPD Mother     Heart disease Father         MI but per patient bc of old age    Diabetes Daughter     Eczema Neg Hx     Lupus Neg Hx     Psoriasis Neg Hx     Melanoma Neg Hx     Kidney disease Neg Hx     Stroke Neg Hx     Mental illness Neg Hx     Mental retardation Neg Hx     Hypertension Neg Hx     Hyperlipidemia Neg Hx     Drug abuse Neg Hx     Alcohol abuse Neg Hx     Depression Neg Hx        Social History:  Social History     Tobacco Use    Smoking status: Former Smoker     Packs/day: 1.00     Years: 15.00     Pack years: 15.00     Last attempt to quit: 2009     Years since quittin.2    Smokeless tobacco: Never Used   Substance and Sexual Activity    Alcohol use: No    Drug use: No     Comment: Is on prescription opiod, no non prescribed use    Sexual activity: Not Currently     Partners: Female        Review of Systems     Review of Systems   Constitutional: Negative for chills and fever.   HENT: Negative for sore throat.    Respiratory: Negative for shortness of breath.    Cardiovascular: Negative for chest pain.   Gastrointestinal: Positive for abdominal pain (lower), nausea and vomiting. Negative for blood in stool,  "constipation and diarrhea.   Genitourinary: Positive for decreased urine volume. Negative for dysuria, frequency, hematuria and urgency.   Musculoskeletal: Negative for back pain.   Skin: Negative for rash.   Neurological: Negative for weakness.   Hematological: Does not bruise/bleed easily.   All other systems reviewed and are negative.       Physical Exam     Initial Vitals [03/11/20 2159]   BP Pulse Resp Temp SpO2   (!) 160/84 85 18 97.8 °F (36.6 °C) 98 %      MAP       --          Physical Exam  Nursing Notes and Vital Signs Reviewed.  Constitutional: Patient is in no acute distress. Well-developed and well-nourished.  Head: Atraumatic. Normocephalic.  Eyes: PERRL. EOM intact. Conjunctivae are not pale. No scleral icterus.  ENT: Mucous membranes are moist. Oropharynx is clear and symmetric.    Neck: Supple. Full ROM. No lymphadenopathy.  Cardiovascular: Regular rate. Regular rhythm. No murmurs, rubs, or gallops. Distal pulses are 2+ and symmetric.  Pulmonary/Chest: No respiratory distress. Clear to auscultation bilaterally. No wheezing or rales.  Abdominal: Soft and non-distended.  There is generalized abd tenderness.  No rebound, guarding, or rigidity. Good bowel sounds.  Genitourinary: No CVA tenderness  Musculoskeletal: R AKA noted. Toes amputation of L foot noted. No edema. No calf tenderness.  Skin: Warm and dry.  Neurological:  Alert, awake, and appropriate.  Normal speech.  No acute focal neurological deficits are appreciated.  Psychiatric: Normal affect. Good eye contact. Appropriate in content.     ED Course   Procedures  ED Vital Signs:  Vitals:    03/11/20 2159 03/11/20 2206 03/11/20 2220 03/11/20 2320   BP: (!) 160/84  (!) 179/86    Pulse: 85  78 78   Resp: 18      Temp: 97.8 °F (36.6 °C)      TempSrc: Oral      SpO2: 98%  100%    Weight:  73.3 kg (161 lb 11.2 oz)     Height: 6' 1" (1.854 m)       03/11/20 2341 03/12/20 0040 03/12/20 0201 03/12/20 0300   BP: (!) 168/80 (!) 143/79 (!) 163/78 (!) " 161/75   Pulse: 80 77 76 75   Resp: 18 20 18 11   Temp:       TempSrc:       SpO2: 97% 100% 97% 96%   Weight:       Height:        03/12/20 0400   BP: (!) 155/74   Pulse: 77   Resp: 20   Temp:    TempSrc:    SpO2: 96%   Weight:    Height:        Abnormal Lab Results:  Labs Reviewed   CBC W/ AUTO DIFFERENTIAL - Abnormal; Notable for the following components:       Result Value    WBC 16.72 (*)     RBC 3.12 (*)     Hemoglobin 8.8 (*)     Hematocrit 28.4 (*)     Mean Corpuscular Hemoglobin Conc 31.0 (*)     RDW 16.8 (*)     Platelets 383 (*)     Gran # (ANC) 15.1 (*)     Immature Grans (Abs) 0.07 (*)     Lymph # 0.6 (*)     Gran% 90.4 (*)     Lymph% 3.6 (*)     All other components within normal limits   COMPREHENSIVE METABOLIC PANEL - Abnormal; Notable for the following components:    CO2 18 (*)     Glucose 144 (*)     BUN, Bld 39 (*)     Creatinine 2.8 (*)     Total Protein 8.5 (*)     Alkaline Phosphatase 196 (*)     eGFR if  25 (*)     eGFR if non  22 (*)     All other components within normal limits   B-TYPE NATRIURETIC PEPTIDE - Abnormal; Notable for the following components:     (*)     All other components within normal limits   URINALYSIS, REFLEX TO URINE CULTURE - Abnormal; Notable for the following components:    Protein, UA 2+ (*)     Occult Blood UA 1+ (*)     Leukocytes, UA 1+ (*)     All other components within normal limits    Narrative:     Preferred Collection Type->Urine, Clean Catch   URINALYSIS MICROSCOPIC - Abnormal; Notable for the following components:    WBC, UA 30 (*)     All other components within normal limits    Narrative:     Preferred Collection Type->Urine, Clean Catch   CULTURE, URINE   TROPONIN I   LIPASE   MAGNESIUM   PHOSPHORUS        All Lab Results:  Results for orders placed or performed during the hospital encounter of 03/11/20   CBC auto differential   Result Value Ref Range    WBC 16.72 (H) 3.90 - 12.70 K/uL    RBC 3.12 (L) 4.60 - 6.20  M/uL    Hemoglobin 8.8 (L) 14.0 - 18.0 g/dL    Hematocrit 28.4 (L) 40.0 - 54.0 %    Mean Corpuscular Volume 91 82 - 98 fL    Mean Corpuscular Hemoglobin 28.2 27.0 - 31.0 pg    Mean Corpuscular Hemoglobin Conc 31.0 (L) 32.0 - 36.0 g/dL    RDW 16.8 (H) 11.5 - 14.5 %    Platelets 383 (H) 150 - 350 K/uL    MPV 9.6 9.2 - 12.9 fL    Immature Granulocytes 0.4 0.0 - 0.5 %    Gran # (ANC) 15.1 (H) 1.8 - 7.7 K/uL    Immature Grans (Abs) 0.07 (H) 0.00 - 0.04 K/uL    Lymph # 0.6 (L) 1.0 - 4.8 K/uL    Mono # 0.7 0.3 - 1.0 K/uL    Eos # 0.2 0.0 - 0.5 K/uL    Baso # 0.04 0.00 - 0.20 K/uL    nRBC 0 0 /100 WBC    Gran% 90.4 (H) 38.0 - 73.0 %    Lymph% 3.6 (L) 18.0 - 48.0 %    Mono% 4.2 4.0 - 15.0 %    Eosinophil% 1.2 0.0 - 8.0 %    Basophil% 0.2 0.0 - 1.9 %    Differential Method Automated    Comprehensive metabolic panel   Result Value Ref Range    Sodium 139 136 - 145 mmol/L    Potassium 4.9 3.5 - 5.1 mmol/L    Chloride 105 95 - 110 mmol/L    CO2 18 (L) 23 - 29 mmol/L    Glucose 144 (H) 70 - 110 mg/dL    BUN, Bld 39 (H) 8 - 23 mg/dL    Creatinine 2.8 (H) 0.5 - 1.4 mg/dL    Calcium 10.0 8.7 - 10.5 mg/dL    Total Protein 8.5 (H) 6.0 - 8.4 g/dL    Albumin 3.7 3.5 - 5.2 g/dL    Total Bilirubin 0.4 0.1 - 1.0 mg/dL    Alkaline Phosphatase 196 (H) 55 - 135 U/L    AST 19 10 - 40 U/L    ALT 13 10 - 44 U/L    Anion Gap 16 8 - 16 mmol/L    eGFR if African American 25 (A) >60 mL/min/1.73 m^2    eGFR if non African American 22 (A) >60 mL/min/1.73 m^2   Brain natriuretic peptide   Result Value Ref Range     (H) 0 - 99 pg/mL   Troponin I   Result Value Ref Range    Troponin I 0.015 0.000 - 0.026 ng/mL   Urinalysis, Reflex to Urine Culture Urine, Clean Catch   Result Value Ref Range    Specimen UA Urine, Clean Catch     Color, UA Yellow Yellow, Straw, Sneha    Appearance, UA Clear Clear    pH, UA 6.0 5.0 - 8.0    Specific Gravity, UA 1.020 1.005 - 1.030    Protein, UA 2+ (A) Negative    Glucose, UA Negative Negative    Ketones, UA Negative  Negative    Bilirubin (UA) Negative Negative    Occult Blood UA 1+ (A) Negative    Nitrite, UA Negative Negative    Urobilinogen, UA Negative <2.0 EU/dL    Leukocytes, UA 1+ (A) Negative   Lipase   Result Value Ref Range    Lipase 10 4 - 60 U/L   Magnesium   Result Value Ref Range    Magnesium 2.2 1.6 - 2.6 mg/dL   Phosphorus   Result Value Ref Range    Phosphorus 4.4 2.7 - 4.5 mg/dL   Urinalysis Microscopic   Result Value Ref Range    RBC, UA 0 0 - 4 /hpf    WBC, UA 30 (H) 0 - 5 /hpf    Bacteria Occasional None-Occ /hpf    Hyaline Casts, UA 0 0-1/lpf /lpf    Microscopic Comment SEE COMMENT          Imaging Results:  Imaging Results          X-Ray Chest AP Portable (In process)                CT Abdomen Pelvis  Without Contrast (In process)                X-Ray Abdomen Flat And Erect (Final result)  Result time 03/11/20 22:41:16    Final result by Aurelio Briceno MD (03/11/20 22:41:16)                 Impression:      Dilated loops of small bowel with multiple air-fluid levels consistent with a small-bowel obstruction.  No definite evidence of free air.      Electronically signed by: Aurelio Briceno MD  Date:    03/11/2020  Time:    22:41             Narrative:    EXAMINATION:  XR ABDOMEN FLAT AND ERECT    CLINICAL HISTORY:  Generalized abdominal pain    TECHNIQUE:  Flat and erect AP views of the abdomen were performed.    COMPARISON:  03/27/2019    FINDINGS:  Left ureteral stent.  Multiple air-fluid levels with distended small bowel loops consistent with a small bowel obstruction.                               1:40 AM: Per STAT radiology, pt's CT results:   1. Reticulonodular densities in the R lower lung may represent an infectious/inflammatory process.   2. Dilated fluid and gas-filled small bowel loops with transition point in LLQ, compatible with SBO.  3. Trace ascites.   4. Mild L hydroureteronephrosis with L ureteral stent noted. No definite obstructing stone identified.      The EKG was ordered, reviewed, and  independently interpreted by the ED provider.  Interpretation time: 2251  Rate: 80 BPM  Rhythm: normal sinus rhythm  Interpretation: No acute ST changes. No STEMI.         The Emergency Provider reviewed the vital signs and test results, which are outlined above.     ED Discussion     11:30 PM: Dr. Red discussed the pt's case with Dr. Yang (General Surgery) who recommends recommends CT scan with contrast and admit to hospital medicine.    12:00 AM: Discussed case with Coni Salgado NP (Hospital Medicine). Dr. Waldrop agrees with current care and management of pt and accepts admission.   Admitting Service: Hospital medicine   Admitting Physician: Dr. Waldrop  Admit to: Obs Med Tele    12:15 AM: Re-evaluated pt. I have discussed test results, shared treatment plan, and the need for admission with patient and family at bedside. Pt and family express understanding at this time and agree with all information. All questions answered. Pt and family have no further questions or concerns at this time. Pt is ready for admit.             Medical Decision Making:   Clinical Tests:   Lab Tests: Ordered and Reviewed  Radiological Study: Ordered and Reviewed  Medical Tests: Ordered and Reviewed           ED Medication(s):  Medications   0.9%  NaCl infusion ( Intravenous New Bag 3/12/20 0059)   albuterol-ipratropium 2.5 mg-0.5 mg/3 mL nebulizer solution 3 mL (has no administration in time range)   sodium chloride 0.9% flush 10 mL (has no administration in time range)   glucose chewable tablet 16 g (has no administration in time range)   glucose chewable tablet 24 g (has no administration in time range)   glucagon (human recombinant) injection 1 mg (has no administration in time range)   dextrose 10% (D10W) Bolus (has no administration in time range)   dextrose 10% (D10W) Bolus (has no administration in time range)   iohexoL (OMNIPAQUE 350) injection 30 mL (30 mLs Oral Given 3/12/20 0035)   ondansetron injection 4 mg (4 mg  Intravenous Given 3/12/20 0104)       New Prescriptions    No medications on file               Scribe Attestation:   Scribe #1: I performed the above scribed service and the documentation accurately describes the services I performed. I attest to the accuracy of the note.     Attending:   Physician Attestation Statement for Scribe #1: I, Ricardo Red MD, personally performed the services described in this documentation, as scribed by Maria Teresa Armstrong, in my presence, and it is both accurate and complete.           Clinical Impression       ICD-10-CM ICD-9-CM   1. SBO (small bowel obstruction) K56.609 560.9   2. Abdominal pain, acute, generalized R10.84 789.07     338.19   3. Chronic renal impairment, unspecified CKD stage N18.9 585.9   4. Anemia, unspecified type D64.9 285.9   5. Nasogastric tube present Z97.8 V45.89       Disposition:   Disposition: Placed in Observation  Condition: Fair         Ricardo Red MD  03/12/20 0537

## 2020-03-12 NOTE — ED NOTES
Dr. Waldrop at bedside. VO to bladder scan patient. If patient has lots of urine to in and out cath patient.

## 2020-03-12 NOTE — H&P
[facility] Hospital Medicine  History & Physical    Patient Name: Kodi Ribeiro  MRN: [id number]  Admission Date: 3/11/2020  Attending Physician: Davonte Patterson MD   Primary Care Provider: Norma Nuñez MD         Patient information was obtained from patient, spouse/SO and ER records.     Subjective:     Principal Problem:<principal problem not specified>    Chief Complaint:   Chief Complaint   Patient presents with    Abdominal Pain     N/V and lower abd pain x 1 day, difficult urination. received 4 mg zofran en route.         HPI:   70 y.o. male patient with a PMHx of anemia, GERD, HTN, HLD, MI  ,previous recurrent abdominal surgery for AAA repair who presented with abdominal pain/vomiting that worsened on the day of admit . Pain was described as sharp, 6/10 in intensity .  Since admission, CT scan of the abdomen showed     Per STAT radiology, pt's CT results:   1. Reticulonodular densities in the R lower lung may represent an infectious/inflammatory process.   2. Dilated fluid and gas-filled small bowel loops with transition point in LLQ, compatible with SBO.  3. Trace ascites.   4. Mild L hydroureteronephrosis with L ureteral stent noted. No definite obstructing stone identified.     Gen surgery was notified in the ED. He had a previous episode of SBO in 2017 which resolved without surgery .His wife is by bedside. He has NG tube in situ.     Past Medical History:   Diagnosis Date    Acute on chronic congestive heart failure 1/13/2020    Acute respiratory failure with hypoxia 1/14/2020    Analgesic nephropathy     Anemia     AP (angina pectoris) 1/11/2019    Arthritis     Colon polyp     Repeat colonoscopy due in 9/14    COPD exacerbation 2/6/2020    Coronary artery disease     Diverticulosis     colonoscopy 2/21/2014    Dysthymia 2/13/2020    Encounter for blood transfusion     GERD (gastroesophageal reflux disease)     Hemorrhoids     colonoscopy 2/21/2014    Horseshoe kidney      Hyperglycemia 3/17/2014    Hyperlipidemia     Hypertension     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    Late complications of amputation stump     rseolved with further amputation( MRSA then none since 2014)    Lipoma of colon     colonoscopy 2/21/2014    Myocardial infarction     per patient 2000 & 9/2012    Peripheral vascular disease     Phantom limb syndrome     patient reports only intermittent not problematic, not worsening    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    Spinal cord disease     L4L5 disc    Stroke     Tobacco dependence     resolved    Ureteral stent retained        Past Surgical History:   Procedure Laterality Date    ABDOMINAL AORTIC ANEURYSM REPAIR      ABDOMINAL AORTIC ANEURYSM REPAIR  1996/2014    AMPUTATION, LOWER LIMB      AORTA - BILATERAL FEMORAL ARTERY BYPASS GRAFT  2014    Left and right leg    CORONARY ANGIOPLASTY WITH STENT PLACEMENT  2000    Three placed in heart    CYSTOSCOPY W/ RETROGRADES Left 5/29/2018    Procedure: CYSTOSCOPY, WITH RETROGRADE PYELOGRAM;  Surgeon: Scooter Jin IV, MD;  Location: [facility];  Service: Urology;  Laterality: Left;    CYSTOSCOPY W/ URETERAL STENT PLACEMENT Left 5/29/2018    Procedure: CYSTOSCOPY, WITH URETERAL STENT INSERTION;  Surgeon: Scooter Jin IV, MD;  Location: [facility] OR;  Service: Urology;  Laterality: Left;    CYSTOSCOPY W/ URETERAL STENT PLACEMENT Left 2/4/2020    Procedure: CYSTOSCOPY, WITH URETERAL STENT INSERTION;  Surgeon: Scooter Jin IV, MD;  Location: [facility] OR;  Service: Urology;  Laterality: Left;    CYSTOSCOPY W/ URETERAL STENT REMOVAL Left 5/29/2018    Procedure: CYSTOSCOPY, WITH URETERAL STENT REMOVAL;  Surgeon: Scooter Jin IV, MD;  Location: [facility] OR;  Service: Urology;  Laterality: Left;    CYSTOSCOPY W/ URETERAL STENT REMOVAL Left 2/4/2020    Procedure: CYSTOSCOPY, WITH URETERAL STENT REMOVAL;  Surgeon: Scooter Jin IV, MD;  Location: [facility] OR;  Service: Urology;  Laterality: Left;     FOOT AMPUTATION THROUGH METATARSAL  1996    left    FOOT SURGERY Bilateral 1980's    per patient multiple toe amputations prior to.  partial foot amputation:first great toe then other toes     KIDNEY SURGERY  2014    per patient separation of horseshoe kidney @ time of AAA repair    LEFT HEART CATHETERIZATION Left 3/7/2019    Procedure: CATHETERIZATION, HEART, LEFT;  Surgeon: Adriel Boone MD;  Location: [facility] CATH LAB;  Service: Cardiology;  Laterality: Left;  630 admit for IV hydration  10am start    LUNG LOBECTOMY Right 1970s    per patient not cancer    right below knee amputation  2009 (approx)    SMALL INTESTINE SURGERY  2014    per patient partial @ time of aaa repair  not small bowel - large bowel bowel compromised bythtwe AAAbowel    TONSILLECTOMY  1955 aprox    URETERAL STENT PLACEMENT Left     annually replaced since 2012 or so  Dr Jin       Review of patient's allergies indicates:   Allergen Reactions    Morphine Itching       No current facility-administered medications on file prior to encounter.      Current Outpatient Medications on File Prior to Encounter   Medication Sig    albuterol-ipratropium (DUO-NEB) 2.5 mg-0.5 mg/3 mL nebulizer solution Take 3 mLs by nebulization every 8 (eight) hours as needed for Wheezing or Shortness of Breath. Rescue    amLODIPine (NORVASC) 10 MG tablet TAKE 1 TABLET EVERY DAY    atorvastatin (LIPITOR) 40 MG tablet Take 1 tablet (40 mg total) by mouth once daily.    carvedilol (COREG) 25 MG tablet Take 1 tablet (25 mg total) by mouth 2 (two) times daily with meals.    cetirizine (ZYRTEC) 10 MG tablet Take 10 mg by mouth once daily.    clopidogrel (PLAVIX) 75 mg tablet TAKE 1 TABLET EVERY DAY    ferrous gluconate (FERGON) 324 MG tablet Take 1 tablet (324 mg total) by mouth 2 (two) times daily with meals.    folic acid-vit B6-vit B12 2.5-25-2 mg (FOLBIC OR EQUIV) 2.5-25-2 mg Tab Take 1 tablet by mouth once daily.    furosemide (LASIX) 20 MG  tablet Take two tab on Mon/Wed/Friday and one tab on Tues/Thurs/ Sat/    hydrALAZINE (APRESOLINE) 25 MG tablet Take 1 tablet (25 mg total) by mouth every 12 (twelve) hours.    isosorbide mononitrate (IMDUR) 60 MG 24 hr tablet Take 2 tablets (120 mg total) by mouth once daily.    mupirocin (BACTROBAN) 2 % ointment Apply topically 3 (three) times daily.    omeprazole (PRILOSEC) 20 MG capsule TAKE 1 CAPSULE TWICE DAILY    sertraline (ZOLOFT) 50 MG tablet Take 1 tablet (50 mg total) by mouth once daily.    triamcinolone acetonide 0.1% (KENALOG) 0.1 % ointment Apply topically 2 (two) times daily.    aspirin (ECOTRIN) 81 MG EC tablet Take 1 tablet (81 mg total) by mouth once daily.    nitroglycerin (NITROSTAT) 0.6 MG Subl Place 1 tablet (0.6 mg total) under the tongue every 5 (five) minutes as needed (max 3/ per episode).    OXYGEN-AIR DELIVERY SYSTEMS MISC by [facility].(Non-Drug; Combo Route) route.     Family History     Problem Relation (Age of Onset)    COPD Mother    Cancer Mother    Diabetes Daughter    Heart disease Father        Tobacco Use    Smoking status: Former Smoker     Packs/day: 1.00     Years: 15.00     Pack years: 15.00     Last attempt to quit: 2009     Years since quittin.2    Smokeless tobacco: Never Used   Substance and Sexual Activity    Alcohol use: No    Drug use: No     Comment: Is on prescription opiod, no non prescribed use    Sexual activity: Not Currently     Partners: Female     Review of Systems   Constitutional: Negative for activity change, appetite change, chills, diaphoresis and fatigue.   HENT: Negative for congestion, dental problem, ear discharge, ear pain and facial swelling.    Eyes: Negative for pain, discharge and itching.   Respiratory: Negative for apnea, cough, chest tightness and shortness of breath.    Cardiovascular: Negative for chest pain and leg swelling.   Gastrointestinal: Positive for abdominal pain. Negative for abdominal distention.    Endocrine: Negative for cold intolerance, heat intolerance and polydipsia.   Genitourinary: Negative for difficulty urinating, dysuria and enuresis.   Musculoskeletal: Negative for arthralgias and back pain.   Skin: Negative for color change and pallor.   Allergic/Immunologic: Negative for environmental allergies and food allergies.   Neurological: Negative for dizziness, facial asymmetry, light-headedness and headaches.   Hematological: Negative for adenopathy. Does not bruise/bleed easily.   Psychiatric/Behavioral: Negative for agitation and behavioral problems.     Objective:     Vital Signs (Most Recent):  Temp: 97.8 °F (36.6 °C) (03/11/20 2159)  Pulse: 77 (03/12/20 0400)  Resp: 20 (03/12/20 0400)  BP: (!) 155/74 (03/12/20 0400)  SpO2: 96 % (03/12/20 0400) Vital Signs (24h Range):  Temp:  [97.8 °F (36.6 °C)] 97.8 °F (36.6 °C)  Pulse:  [75-85] 77  Resp:  [11-20] 20  SpO2:  [96 %-100 %] 96 %  BP: (143-179)/(74-86) 155/74     Weight: 73.3 kg (161 lb 11.2 oz)  Body mass index is 21.33 kg/m².    Physical Exam   Constitutional: He is oriented to person, place, and time. He appears well-developed and well-nourished.   HENT:   Head: Normocephalic and atraumatic.   Nose: Nose normal.   Eyes: Pupils are equal, round, and reactive to light. EOM are normal.   Neck: Normal range of motion. Neck supple.   Cardiovascular: Normal rate, regular rhythm and normal heart sounds.   Pulmonary/Chest: Effort normal and breath sounds normal. No respiratory distress. He has no wheezes. He has no rales.   Abdominal: There is tenderness.   Has NG tube   Musculoskeletal: Normal range of motion. He exhibits no edema.   S/p right BKA and left TMA   Neurological: He is alert and oriented to person, place, and time.   Skin: Skin is dry.   Nursing note and vitals reviewed.        CRANIAL NERVES     CN III, IV, VI   Pupils are equal, round, and reactive to light.  Extraocular motions are normal.        Significant Labs:   BMP:   Recent Labs    Lab 03/11/20  2220   *      K 4.9      CO2 18*   BUN 39*   CREATININE 2.8*   CALCIUM 10.0   MG 2.2     CBC:   Recent Labs   Lab 03/11/20  2220   WBC 16.72*   HGB 8.8*   HCT 28.4*   *     All pertinent labs within the past 24 hours have been reviewed.    Significant Imaging: I have reviewed and interpreted all pertinent imaging results/findings within the past 24 hours.    Assessment/Plan:     SBO (small bowel obstruction)  NG tube  NPO   Gen surgery consult .        CKD (chronic kidney disease) stage 3, GFR 30-59 ml/min  Will avoid nephrotoxic meds , monitor serum creatinine       Anemia    Will monitor closely, transfuse as needed     Essential hypertension    He is NPO , will use hydralazine PRN       VTE Risk Mitigation (From admission, onward)         Ordered     IP VTE HIGH RISK PATIENT  Once      03/12/20 0025     Reason for no Mechanical VTE Prophylaxis  Once     Question:  Reasons:  Answer:  Physician Provided (leave comment)    03/12/20 0025     Place sequential compression device  Until discontinued      03/12/20 0025                   Lavell Waldrop MD  Department of Hospital Medicine   [facility]

## 2020-03-12 NOTE — ED NOTES
Patient stating abdominal pain is back and is getting worse with increasing in nausea. Notified Coni NP with hospital medicine. New orders noted.

## 2020-03-12 NOTE — SIGNIFICANT EVENT
Patient seen and examined today. 70 year old male who was admitted to [facility] for SBO. Currently has NGT. Awaiting General Surgery consult.  Reactive leukocytosis has improved. Creatinine essentially unchanged from yesterday. Continue gentle hydration. Consider Nephrology consult, if continues to worsen. Electrolytes stable.

## 2020-03-12 NOTE — SUBJECTIVE & OBJECTIVE
Past Medical History:   Diagnosis Date    Acute on chronic congestive heart failure 1/13/2020    Acute respiratory failure with hypoxia 1/14/2020    Analgesic nephropathy     Anemia     AP (angina pectoris) 1/11/2019    Arthritis     Colon polyp     Repeat colonoscopy due in 9/14    COPD exacerbation 2/6/2020    Coronary artery disease     Diverticulosis     colonoscopy 2/21/2014    Dysthymia 2/13/2020    Encounter for blood transfusion     GERD (gastroesophageal reflux disease)     Hemorrhoids     colonoscopy 2/21/2014    Horseshoe kidney     Hyperglycemia 3/17/2014    Hyperlipidemia     Hypertension     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    Late complications of amputation stump     rseolved with further amputation( MRSA then none since 2014)    Lipoma of colon     colonoscopy 2/21/2014    Myocardial infarction     per patient 2000 & 9/2012    Peripheral vascular disease     Phantom limb syndrome     patient reports only intermittent not problematic, not worsening    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    Spinal cord disease     L4L5 disc    Stroke     Tobacco dependence     resolved    Ureteral stent retained        Past Surgical History:   Procedure Laterality Date    ABDOMINAL AORTIC ANEURYSM REPAIR      ABDOMINAL AORTIC ANEURYSM REPAIR  1996/2014    AMPUTATION, LOWER LIMB      AORTA - BILATERAL FEMORAL ARTERY BYPASS GRAFT  2014    Left and right leg    CORONARY ANGIOPLASTY WITH STENT PLACEMENT  2000    Three placed in heart    CYSTOSCOPY W/ RETROGRADES Left 5/29/2018    Procedure: CYSTOSCOPY, WITH RETROGRADE PYELOGRAM;  Surgeon: Scooter Jin IV, MD;  Location: [facility] OR;  Service: Urology;  Laterality: Left;    CYSTOSCOPY W/ URETERAL STENT PLACEMENT Left 5/29/2018    Procedure: CYSTOSCOPY, WITH URETERAL STENT INSERTION;  Surgeon: Scooter Jin IV, MD;  Location: [facility] OR;  Service: Urology;  Laterality: Left;    CYSTOSCOPY W/ URETERAL STENT PLACEMENT Left  2/4/2020    Procedure: CYSTOSCOPY, WITH URETERAL STENT INSERTION;  Surgeon: Scooter Jin IV, MD;  Location: [facility] OR;  Service: Urology;  Laterality: Left;    CYSTOSCOPY W/ URETERAL STENT REMOVAL Left 5/29/2018    Procedure: CYSTOSCOPY, WITH URETERAL STENT REMOVAL;  Surgeon: Scooter Jin IV, MD;  Location: [facility] OR;  Service: Urology;  Laterality: Left;    CYSTOSCOPY W/ URETERAL STENT REMOVAL Left 2/4/2020    Procedure: CYSTOSCOPY, WITH URETERAL STENT REMOVAL;  Surgeon: Scooter iJn IV, MD;  Location: [facility] OR;  Service: Urology;  Laterality: Left;    FOOT AMPUTATION THROUGH METATARSAL  1996    left    FOOT SURGERY Bilateral 1980's    per patient multiple toe amputations prior to.  partial foot amputation:first great toe then other toes     KIDNEY SURGERY  2014    per patient separation of horseshoe kidney @ time of AAA repair    LEFT HEART CATHETERIZATION Left 3/7/2019    Procedure: CATHETERIZATION, HEART, LEFT;  Surgeon: dAriel Boone MD;  Location: [facility] CATH LAB;  Service: Cardiology;  Laterality: Left;  630 admit for IV hydration  10am start    LUNG LOBECTOMY Right 1970s    per patient not cancer    right below knee amputation  2009 (approx)    SMALL INTESTINE SURGERY  2014    per patient partial @ time of aaa repair  not small bowel - large bowel bowel compromised bythtwe AAAbowel    TONSILLECTOMY  1955 aprox    URETERAL STENT PLACEMENT Left     annually replaced since 2012 or so  Dr Jin       Review of patient's allergies indicates:   Allergen Reactions    Morphine Itching       No current facility-administered medications on file prior to encounter.      Current Outpatient Medications on File Prior to Encounter   Medication Sig    albuterol-ipratropium (DUO-NEB) 2.5 mg-0.5 mg/3 mL nebulizer solution Take 3 mLs by nebulization every 8 (eight) hours as needed for Wheezing or Shortness of Breath. Rescue    amLODIPine (NORVASC) 10 MG tablet TAKE 1 TABLET EVERY DAY    atorvastatin  (LIPITOR) 40 MG tablet Take 1 tablet (40 mg total) by mouth once daily.    carvedilol (COREG) 25 MG tablet Take 1 tablet (25 mg total) by mouth 2 (two) times daily with meals.    cetirizine (ZYRTEC) 10 MG tablet Take 10 mg by mouth once daily.    clopidogrel (PLAVIX) 75 mg tablet TAKE 1 TABLET EVERY DAY    ferrous gluconate (FERGON) 324 MG tablet Take 1 tablet (324 mg total) by mouth 2 (two) times daily with meals.    folic acid-vit B6-vit B12 2.5-25-2 mg (FOLBIC OR EQUIV) 2.5-25-2 mg Tab Take 1 tablet by mouth once daily.    furosemide (LASIX) 20 MG tablet Take two tab on Mon/Wed/Friday and one tab on Tues/Thurs/ Sat/    hydrALAZINE (APRESOLINE) 25 MG tablet Take 1 tablet (25 mg total) by mouth every 12 (twelve) hours.    isosorbide mononitrate (IMDUR) 60 MG 24 hr tablet Take 2 tablets (120 mg total) by mouth once daily.    mupirocin (BACTROBAN) 2 % ointment Apply topically 3 (three) times daily.    omeprazole (PRILOSEC) 20 MG capsule TAKE 1 CAPSULE TWICE DAILY    sertraline (ZOLOFT) 50 MG tablet Take 1 tablet (50 mg total) by mouth once daily.    triamcinolone acetonide 0.1% (KENALOG) 0.1 % ointment Apply topically 2 (two) times daily.    aspirin (ECOTRIN) 81 MG EC tablet Take 1 tablet (81 mg total) by mouth once daily.    nitroglycerin (NITROSTAT) 0.6 MG Subl Place 1 tablet (0.6 mg total) under the tongue every 5 (five) minutes as needed (max 3/ per episode).    OXYGEN-AIR DELIVERY SYSTEMS MISC by [facility].(Non-Drug; Combo Route) route.     Family History     Problem Relation (Age of Onset)    COPD Mother    Cancer Mother    Diabetes Daughter    Heart disease Father        Tobacco Use    Smoking status: Former Smoker     Packs/day: 1.00     Years: 15.00     Pack years: 15.00     Last attempt to quit: 2009     Years since quittin.2    Smokeless tobacco: Never Used   Substance and Sexual Activity    Alcohol use: No    Drug use: No     Comment: Is on prescription opiod, no non  prescribed use    Sexual activity: Not Currently     Partners: Female     Review of Systems   Constitutional: Negative for activity change, appetite change, chills, diaphoresis and fatigue.   HENT: Negative for congestion, dental problem, ear discharge, ear pain and facial swelling.    Eyes: Negative for pain, discharge and itching.   Respiratory: Negative for apnea, cough, chest tightness and shortness of breath.    Cardiovascular: Negative for chest pain and leg swelling.   Gastrointestinal: Positive for abdominal pain. Negative for abdominal distention.   Endocrine: Negative for cold intolerance, heat intolerance and polydipsia.   Genitourinary: Negative for difficulty urinating, dysuria and enuresis.   Musculoskeletal: Negative for arthralgias and back pain.   Skin: Negative for color change and pallor.   Allergic/Immunologic: Negative for environmental allergies and food allergies.   Neurological: Negative for dizziness, facial asymmetry, light-headedness and headaches.   Hematological: Negative for adenopathy. Does not bruise/bleed easily.   Psychiatric/Behavioral: Negative for agitation and behavioral problems.     Objective:     Vital Signs (Most Recent):  Temp: 97.8 °F (36.6 °C) (03/11/20 2159)  Pulse: 77 (03/12/20 0400)  Resp: 20 (03/12/20 0400)  BP: (!) 155/74 (03/12/20 0400)  SpO2: 96 % (03/12/20 0400) Vital Signs (24h Range):  Temp:  [97.8 °F (36.6 °C)] 97.8 °F (36.6 °C)  Pulse:  [75-85] 77  Resp:  [11-20] 20  SpO2:  [96 %-100 %] 96 %  BP: (143-179)/(74-86) 155/74     Weight: 73.3 kg (161 lb 11.2 oz)  Body mass index is 21.33 kg/m².    Physical Exam   Constitutional: He is oriented to person, place, and time. He appears well-developed and well-nourished.   HENT:   Head: Normocephalic and atraumatic.   Nose: Nose normal.   Eyes: Pupils are equal, round, and reactive to light. EOM are normal.   Neck: Normal range of motion. Neck supple.   Cardiovascular: Normal rate, regular rhythm and normal heart  sounds.   Pulmonary/Chest: Effort normal and breath sounds normal. No respiratory distress. He has no wheezes. He has no rales.   Abdominal: There is tenderness.   Has NG tube   Musculoskeletal: Normal range of motion. He exhibits no edema.   S/p right BKA and left TMA   Neurological: He is alert and oriented to person, place, and time.   Skin: Skin is dry.   Nursing note and vitals reviewed.        CRANIAL NERVES     CN III, IV, VI   Pupils are equal, round, and reactive to light.  Extraocular motions are normal.        Significant Labs:   BMP:   Recent Labs   Lab 03/11/20  2220   *      K 4.9      CO2 18*   BUN 39*   CREATININE 2.8*   CALCIUM 10.0   MG 2.2     CBC:   Recent Labs   Lab 03/11/20  2220   WBC 16.72*   HGB 8.8*   HCT 28.4*   *     All pertinent labs within the past 24 hours have been reviewed.    Significant Imaging: I have reviewed and interpreted all pertinent imaging results/findings within the past 24 hours.

## 2020-03-12 NOTE — PLAN OF CARE
SW met with patient and his wife at bedside to complete discharge planning assessment. Patient lives with his wife and she helps manage his care. Patient is current with [facility] and will resume services when discharged. Patient uses a wheelchair and oxygen supplied by [facility]. Patient denies any other CM needs. Patient denies LW/POA. Patient denies any post hospital needs or services at this time. Transitional Care Folder, Discharge Planning Begins on Admission pamphlet, [facility] pamphlet, Advance Directive information given to patient. Sw placed contact information on white board. Sw instructed patient or family to call with any questions or concerns.      D/C plan: Home with home Health  Norma Nuñez MD    [facility] - [place], [place] - [address]  [address] [address]  Phone: [phone] Fax: [phone]    [id number] - [place], [place] - [address] [facility] [address]  [address]  [place] [address]  Phone: [phone] Fax: [phone]       03/12/20 1442   Discharge Assessment   Assessment Type Discharge Planning Assessment   Confirmed/corrected address and phone number on facesheet? Yes   Assessment information obtained from? Patient;Caregiver   Communicated expected length of stay with patient/caregiver yes   Prior to hospitilization cognitive status: Alert/Oriented   Prior to hospitalization functional status: Assistive Equipment   Current cognitive status: Alert/Oriented   Current Functional Status: Assistive Equipment   Facility Arrived From: Home    Lives With significant other   Able to Return to Prior Arrangements no   Who are your caregiver(s) and their phone number(s)? Gema Schaeffer [phone]   Readmission Within the Last 30 Days no previous admission in last 30 days   Patient currently being followed by outpatient case management? No   Patient currently  receives any other outside agency services? No   Equipment Currently Used at Home oxygen;wheelchair   Do you have any problems affording any of your prescribed medications? No   Is the patient taking medications as prescribed? yes   Does the patient have transportation home? Yes   Transportation Anticipated family or friend will provide   Does the patient receive services at the [facility]? No   Discharge Plan A Home Health   Discharge Plan B Home with family   DME Needed Upon Discharge  none   Patient/Family in Agreement with Plan yes

## 2020-03-12 NOTE — CONSULTS
[facility] -   Nephrology  Consult Note      Patient Name: Kodi Ribeiro  MRN: [id number]  Admission Date: 3/11/2020  Hospital Length of Stay: 0 days  Attending Provider: Davonte Patterson MD   Primary Care Physician: Norma Nuñez MD  Principal Problem:<principal problem not specified>    Consults  Subjective:     HPI: Kodi Ribeiro is a  70-year-old  man  with history of hypertension , chronic kidney disease stage 4.  Patient's baseline creatinine has fluctuates between 2 and 3 mg/dL, currently at his baseline, patient was admitted for small bowel obstruction, according to the patient and his wife he has been having some nausea and vomiting at home, CT scan of the abdomen showed small bowel obstruction, patient has history of left ureter obstruction and has stents in place, CT scan of the abdomen shows mild hydronephrosis on that side, also he has nonobstructing stones bilaterally,    Past Medical History:   Diagnosis Date    Acute on chronic congestive heart failure 1/13/2020    Acute respiratory failure with hypoxia 1/14/2020    Analgesic nephropathy     Anemia     AP (angina pectoris) 1/11/2019    Arthritis     Colon polyp     Repeat colonoscopy due in 9/14    COPD exacerbation 2/6/2020    Coronary artery disease     Diverticulosis     colonoscopy 2/21/2014    Dysthymia 2/13/2020    Encounter for blood transfusion     GERD (gastroesophageal reflux disease)     Hemorrhoids     colonoscopy 2/21/2014    Horseshoe kidney     Hyperglycemia 3/17/2014    Hyperlipidemia     Hypertension     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    Late complications of amputation stump     rseolved with further amputation( MRSA then none since 2014)    Lipoma of colon     colonoscopy 2/21/2014    Myocardial infarction     per patient 2000 & 9/2012    Peripheral vascular disease     Phantom limb syndrome     patient reports only intermittent not problematic, not worsening    S/P aorto-bifemoral  bypass surgery 3/17/2014    Spinal cord disease     L4L5 disc    Stroke     Tobacco dependence     resolved    Ureteral stent retained        Past Surgical History:   Procedure Laterality Date    ABDOMINAL AORTIC ANEURYSM REPAIR      ABDOMINAL AORTIC ANEURYSM REPAIR  1996/2014    AMPUTATION, LOWER LIMB      AORTA - BILATERAL FEMORAL ARTERY BYPASS GRAFT  2014    Left and right leg    CORONARY ANGIOPLASTY WITH STENT PLACEMENT  2000    Three placed in heart    CYSTOSCOPY W/ RETROGRADES Left 5/29/2018    Procedure: CYSTOSCOPY, WITH RETROGRADE PYELOGRAM;  Surgeon: Scooter Jin IV, MD;  Location: [facility] OR;  Service: Urology;  Laterality: Left;    CYSTOSCOPY W/ URETERAL STENT PLACEMENT Left 5/29/2018    Procedure: CYSTOSCOPY, WITH URETERAL STENT INSERTION;  Surgeon: Scooter Jin IV, MD;  Location: [facility] OR;  Service: Urology;  Laterality: Left;    CYSTOSCOPY W/ URETERAL STENT PLACEMENT Left 2/4/2020    Procedure: CYSTOSCOPY, WITH URETERAL STENT INSERTION;  Surgeon: Scooter Jin IV, MD;  Location: [facility] OR;  Service: Urology;  Laterality: Left;    CYSTOSCOPY W/ URETERAL STENT REMOVAL Left 5/29/2018    Procedure: CYSTOSCOPY, WITH URETERAL STENT REMOVAL;  Surgeon: Scooter Jin IV, MD;  Location: [facility] OR;  Service: Urology;  Laterality: Left;    CYSTOSCOPY W/ URETERAL STENT REMOVAL Left 2/4/2020    Procedure: CYSTOSCOPY, WITH URETERAL STENT REMOVAL;  Surgeon: Scooter Jin IV, MD;  Location: [facility] OR;  Service: Urology;  Laterality: Left;    FOOT AMPUTATION THROUGH METATARSAL  1996    left    FOOT SURGERY Bilateral 1980's    per patient multiple toe amputations prior to.  partial foot amputation:first great toe then other toes     KIDNEY SURGERY  2014    per patient separation of horseshoe kidney @ time of AAA repair    LEFT HEART CATHETERIZATION Left 3/7/2019    Procedure: CATHETERIZATION, HEART, LEFT;  Surgeon: Adriel Boone MD;  Location: [facility] CATH LAB;  Service: Cardiology;   Laterality: Left;  630 admit for IV hydration  10am start    LUNG LOBECTOMY Right 1970s    per patient not cancer    right below knee amputation   (approx)    SMALL INTESTINE SURGERY      per patient partial @ time of aaa repair  not small bowel - large bowel bowel compromised bythtwe AAAbowel    TONSILLECTOMY   aprox    URETERAL STENT PLACEMENT Left     annually replaced since  or so  Dr Jin       Review of patient's allergies indicates:   Allergen Reactions    Morphine Itching     Current Facility-Administered Medications   Medication Frequency    0.9%  NaCl infusion Continuous    albuterol-ipratropium 2.5 mg-0.5 mg/3 mL nebulizer solution 3 mL Q8H PRN    [START ON 3/13/2020] cefepime in dextrose 5 % 1 gram/50 mL IVPB 1 g Q24H    dextrose 10% (D10W) Bolus PRN    dextrose 10% (D10W) Bolus PRN    famotidine IVPB 20 mg Daily    glucagon (human recombinant) injection 1 mg PRN    glucose chewable tablet 16 g PRN    glucose chewable tablet 24 g PRN    hydrALAZINE injection 10 mg Q6H PRN    meperidine (PF) injection 12.5 mg Q4H PRN    ondansetron injection 4 mg Q6H PRN    promethazine (PHENERGAN) 6.25 mg in dextrose 5 % 50 mL IVPB Q6H PRN    sodium chloride 0.9% flush 10 mL PRN     Family History     Problem Relation (Age of Onset)    COPD Mother    Cancer Mother    Diabetes Daughter    Heart disease Father        Tobacco Use    Smoking status: Former Smoker     Packs/day: 1.00     Years: 15.00     Pack years: 15.00     Last attempt to quit: 2009     Years since quittin.2    Smokeless tobacco: Never Used   Substance and Sexual Activity    Alcohol use: No    Drug use: No     Comment: Is on prescription opiod, no non prescribed use    Sexual activity: Not Currently     Partners: Female     Review of Systems   Constitutional: Positive for activity change and appetite change.   HENT: Negative for congestion and facial swelling.    Eyes: Negative for pain, discharge and  redness.   Respiratory: Negative for apnea, cough and chest tightness.    Cardiovascular: Negative for chest pain, palpitations and leg swelling.   Gastrointestinal: Positive for abdominal pain. Negative for abdominal distention.   Genitourinary: Negative for difficulty urinating, dysuria and frequency.   Musculoskeletal: Negative for neck pain and neck stiffness.   Skin: Negative for color change, rash and wound.   Neurological: Positive for weakness. Negative for dizziness and numbness.   Psychiatric/Behavioral: Negative for sleep disturbance.   All other systems reviewed and are negative.    Objective:     Vital Signs (Most Recent):  Temp: 98 °F (36.7 °C) (03/12/20 1135)  Pulse: 78 (03/12/20 1135)  Resp: 18 (03/12/20 1135)  BP: (!) 174/81 (03/12/20 1135)  SpO2: 96 % (03/12/20 1135)  O2 Device (Oxygen Therapy): room air (03/12/20 0800) Vital Signs (24h Range):  Temp:  [97.5 °F (36.4 °C)-98 °F (36.7 °C)] 98 °F (36.7 °C)  Pulse:  [72-85] 78  Resp:  [11-20] 18  SpO2:  [94 %-100 %] 96 %  BP: (130-179)/(73-86) 174/81     Weight: 73.3 kg (161 lb 11.2 oz) (03/11/20 2206)  Body mass index is 21.33 kg/m².  Body surface area is 1.94 meters squared.    I/O last 3 completed shifts:  In: -   Out: 300 [Drains:300]    Physical Exam   Constitutional: He is oriented to person, place, and time. He appears well-developed. No distress.   HENT:   Head: Normocephalic and atraumatic.   OGT in place   Eyes: Pupils are equal, round, and reactive to light.   Neck: Normal range of motion. Neck supple. No tracheal deviation present. No thyromegaly present.   Cardiovascular: Normal rate, regular rhythm, normal heart sounds and intact distal pulses. Exam reveals no gallop and no friction rub.   No murmur heard.  Pulmonary/Chest: Effort normal and breath sounds normal. He has no wheezes. He has no rales.   Abdominal: Soft. He exhibits no mass. There is tenderness. There is no rebound and no guarding.   Musculoskeletal: Normal range of motion.  He exhibits no edema.   Lymphadenopathy:     He has no cervical adenopathy.   Neurological: He is alert and oriented to person, place, and time.   Skin: Skin is warm. No rash noted. He is not diaphoretic. No erythema.   Nursing note and vitals reviewed.      Significant Labs:  CBC:   Recent Labs   Lab 03/12/20  1043   WBC 10.26   RBC 2.95*   HGB 8.1*   HCT 27.9*      MCV 95   MCH 27.5   MCHC 29.0*     CMP:   Recent Labs   Lab 03/12/20  1043   *   CALCIUM 9.8   ALBUMIN 3.6   PROT 8.1      K 5.3*   CO2 24      BUN 43*   CREATININE 2.9*   ALKPHOS 191*   ALT 14   AST 20   BILITOT 0.3     Coagulation: No results for input(s): PT, INR, APTT in the last 168 hours.  LFTs:   Recent Labs   Lab 03/12/20  1043   ALT 14   AST 20   ALKPHOS 191*   BILITOT 0.3   PROT 8.1   ALBUMIN 3.6     All labs within the past 24 hours have been reviewed.    Significant Imaging:  Reviewed    Lab Results   Component Value Date    .0 (H) 02/26/2020    CALCIUM 9.8 03/12/2020    PHOS 4.4 03/11/2020         Assessment/Plan:     CKD (chronic kidney disease) stage 4, GFR 15-29 ml/min  1. CKD stage 4 :  Patient baseline serum creatinine fluctuates between 2 and 3 mg/dL, currently at his baseline, CT scan of the abdomen shows mild hydronephrosis on the left side, patient has history of ureteric stricture and stent placement on that side, also has nonobstructing stones bilaterally, monitor renal function closely, avoid Ace inhibitors/ARB/NSAIDs at this time, continue gentle hydration as he is on NPO status,    2.  Hypertension - blood pressure is slightly elevated, will add IV hydralazine coverage,    3.  Small bowel obstruction - management per General surgery    4.  Anemia - multifactorial, monitor hemoglobin, PRBC transfusion when indicated,    5. Hyperkalemia - mild, will monitor            Thank you for your consult. I will follow-up with patient. Please contact us if you have any additional questions.     Total time  spent 70 minutes including time needed to review the records,  patient  evaluation, documentation, face-to-face discussion with the patient, spouse. Primary team, more than 50% of the time was spent on coordination of care and counseling.       Nilay Zambrano MD   Nephrology  [facility]

## 2020-03-12 NOTE — HPI
Kodi Ribeiro is a  70-year-old  man  with history of hypertension , chronic kidney disease stage 4.  Patient's baseline creatinine has fluctuates between 2 and 3 mg/dL, currently at his baseline, patient was admitted for small bowel obstruction, according to the patient and his wife he has been having some nausea and vomiting at home, CT scan of the abdomen showed small bowel obstruction, patient has history of left ureter obstruction and has stents in place, CT scan of the abdomen shows mild hydronephrosis on that side, also he has nonobstructing stones bilaterally,

## 2020-03-12 NOTE — PLAN OF CARE
AAO x 4. Ambulate per WC at home. Left BKA and R Partial foot amputated. Pain controlled. Right NG tube to LIS. IV fluids. Nausea and acid reflux minimized. Telemetry monitoring. Aseptic technique maintained. NPO. Free from injury. NADN. Call light in reach. Chart check completed.

## 2020-03-12 NOTE — PROGRESS NOTES
Pharmacist Renal Dose Adjustment Note    Kodi Ribeiro is a 70 y.o. male being treated with the medication famotidine.     Patient Data:    Vital Signs (Most Recent):  Temp: 98 °F (36.7 °C) (03/12/20 1135)  Pulse: 78 (03/12/20 1135)  Resp: 18 (03/12/20 1135)  BP: (!) 174/81 (03/12/20 1135)  SpO2: 96 % (03/12/20 1135)   Vital Signs (72h Range):  Temp:  [97.5 °F (36.4 °C)-98 °F (36.7 °C)]   Pulse:  [72-85]   Resp:  [11-20]   BP: (130-179)/(73-86)   SpO2:  [94 %-100 %]      Recent Labs   Lab 03/11/20  2220 03/12/20  1043   CREATININE 2.8* 2.9*     Serum creatinine: 2.9 mg/dL (H) 03/12/20 1043  Estimated creatinine clearance: 24.2 mL/min (A)    Medication:famotidine dose: 20 mg frequency Q12H will be changed to medication:famotidine dose:20 mg frequency:Q24H.     Pharmacist's Name: Becky Sevilla  Pharmacist's Extension: [phone]

## 2020-03-12 NOTE — PROGRESS NOTES
Pharmacist Renal Dose Adjustment Note    Kodi Ribeiro is a 70 y.o. male being treated with the medication Cefepime    Patient Data:    Vital Signs (Most Recent):  Temp: 97.6 °F (36.4 °C) (03/12/20 0801)  Pulse: 77 (03/12/20 0801)  Resp: 16 (03/12/20 0801)  BP: (!) 165/76 (03/12/20 0801)  SpO2: 98 % (03/12/20 0801)   Vital Signs (72h Range):  Temp:  [97.5 °F (36.4 °C)-97.9 °F (36.6 °C)]   Pulse:  [72-85]   Resp:  [11-20]   BP: (130-179)/(73-86)   SpO2:  [94 %-100 %]      Recent Labs   Lab 03/11/20 2220   CREATININE 2.8*     Serum creatinine: 2.8 mg/dL (H) 03/11/20 2220  Estimated creatinine clearance: 25.1 mL/min (A)    Medication:Cefepime dose: 1 gram frequency q12h will be changed to medication:Cefepime dose:1 gram frequency:q24h    Pharmacist's Name: Nury Cody  Pharmacist's Extension: [phone]

## 2020-03-12 NOTE — ED NOTES
NG tube placement verified with pH testing of aspirate in 3-4 range.  Xray ordered for verification.

## 2020-03-12 NOTE — ED NOTES
Pt. Resting in bed. Pt denies any pain at this time. Family member at bedside. No acute distress, RR equal and non labored, VSS. Bed in low, locked, and call light in reach. Side rails up X 2. Will continue to monitor.

## 2020-03-12 NOTE — HPI
70 y.o. male patient with a PMHx of anemia, GERD, HTN, HLD, MI ,previous recurrent abdominal surgery for AAA repair who presented with abdominal pain/vomiting that worsened on the day of admit . Pain was described as sharp, 6/10 in intensity .  Since admission, CT scan of the abdomen showed     Per STAT radiology, pt's CT results:   1. Reticulonodular densities in the R lower lung may represent an infectious/inflammatory process.   2. Dilated fluid and gas-filled small bowel loops with transition point in LLQ, compatible with SBO.  3. Trace ascites.   4. Mild L hydroureteronephrosis with L ureteral stent noted. No definite obstructing stone identified.     Gen surgery was notified in the ED. He had a previous episode of SBO in 2017 which resolved without surgery .His wife is by bedside. He has NG tube in situ.

## 2020-03-13 LAB
ANION GAP SERPL CALC-SCNC: 12 MMOL/L (ref 8–16)
BASOPHILS # BLD AUTO: 0.03 K/UL (ref 0–0.2)
BASOPHILS NFR BLD: 0.3 % (ref 0–1.9)
BUN SERPL-MCNC: 48 MG/DL (ref 8–23)
CALCIUM SERPL-MCNC: 9.6 MG/DL (ref 8.7–10.5)
CHLORIDE SERPL-SCNC: 109 MMOL/L (ref 95–110)
CO2 SERPL-SCNC: 20 MMOL/L (ref 23–29)
CREAT SERPL-MCNC: 2.7 MG/DL (ref 0.5–1.4)
DIFFERENTIAL METHOD: ABNORMAL
EOSINOPHIL # BLD AUTO: 0.1 K/UL (ref 0–0.5)
EOSINOPHIL NFR BLD: 1.2 % (ref 0–8)
ERYTHROCYTE [DISTWIDTH] IN BLOOD BY AUTOMATED COUNT: 17.1 % (ref 11.5–14.5)
EST. GFR  (AFRICAN AMERICAN): 26 ML/MIN/1.73 M^2
EST. GFR  (NON AFRICAN AMERICAN): 23 ML/MIN/1.73 M^2
GLUCOSE SERPL-MCNC: 118 MG/DL (ref 70–110)
HCT VFR BLD AUTO: 28.9 % (ref 40–54)
HGB BLD-MCNC: 8.5 G/DL (ref 14–18)
IMM GRANULOCYTES # BLD AUTO: 0.04 K/UL (ref 0–0.04)
IMM GRANULOCYTES NFR BLD AUTO: 0.4 % (ref 0–0.5)
LYMPHOCYTES # BLD AUTO: 0.8 K/UL (ref 1–4.8)
LYMPHOCYTES NFR BLD: 7.9 % (ref 18–48)
MAGNESIUM SERPL-MCNC: 2.5 MG/DL (ref 1.6–2.6)
MCH RBC QN AUTO: 27.7 PG (ref 27–31)
MCHC RBC AUTO-ENTMCNC: 29.4 G/DL (ref 32–36)
MCV RBC AUTO: 94 FL (ref 82–98)
MONOCYTES # BLD AUTO: 0.6 K/UL (ref 0.3–1)
MONOCYTES NFR BLD: 5.8 % (ref 4–15)
NEUTROPHILS # BLD AUTO: 8.1 K/UL (ref 1.8–7.7)
NEUTROPHILS NFR BLD: 84.4 % (ref 38–73)
NRBC BLD-RTO: 0 /100 WBC
PHOSPHATE SERPL-MCNC: 5.4 MG/DL (ref 2.7–4.5)
PLATELET # BLD AUTO: 317 K/UL (ref 150–350)
PMV BLD AUTO: 9.5 FL (ref 9.2–12.9)
POTASSIUM SERPL-SCNC: 5 MMOL/L (ref 3.5–5.1)
RBC # BLD AUTO: 3.07 M/UL (ref 4.6–6.2)
SODIUM SERPL-SCNC: 141 MMOL/L (ref 136–145)
WBC # BLD AUTO: 9.63 K/UL (ref 3.9–12.7)

## 2020-03-13 PROCEDURE — 99233 PR SUBSEQUENT HOSPITAL CARE,LEVL III: ICD-10-PCS | Mod: HCNC,,, | Performed by: INTERNAL MEDICINE

## 2020-03-13 PROCEDURE — 63600175 PHARM REV CODE 636 W HCPCS: Mod: HCNC | Performed by: INTERNAL MEDICINE

## 2020-03-13 PROCEDURE — 80048 BASIC METABOLIC PNL TOTAL CA: CPT | Mod: HCNC

## 2020-03-13 PROCEDURE — 25000003 PHARM REV CODE 250: Mod: HCNC | Performed by: FAMILY MEDICINE

## 2020-03-13 PROCEDURE — 63600175 PHARM REV CODE 636 W HCPCS: Mod: HCNC | Performed by: NURSE PRACTITIONER

## 2020-03-13 PROCEDURE — 99232 PR SUBSEQUENT HOSPITAL CARE,LEVL II: ICD-10-PCS | Mod: HCNC,,, | Performed by: COLON & RECTAL SURGERY

## 2020-03-13 PROCEDURE — 36415 COLL VENOUS BLD VENIPUNCTURE: CPT | Mod: HCNC

## 2020-03-13 PROCEDURE — 99233 SBSQ HOSP IP/OBS HIGH 50: CPT | Mod: HCNC,,, | Performed by: INTERNAL MEDICINE

## 2020-03-13 PROCEDURE — S0028 INJECTION, FAMOTIDINE, 20 MG: HCPCS | Mod: HCNC | Performed by: FAMILY MEDICINE

## 2020-03-13 PROCEDURE — 83735 ASSAY OF MAGNESIUM: CPT | Mod: HCNC

## 2020-03-13 PROCEDURE — 99232 SBSQ HOSP IP/OBS MODERATE 35: CPT | Mod: HCNC,,, | Performed by: COLON & RECTAL SURGERY

## 2020-03-13 PROCEDURE — 11000001 HC ACUTE MED/SURG PRIVATE ROOM: Mod: HCNC

## 2020-03-13 PROCEDURE — 85025 COMPLETE CBC W/AUTO DIFF WBC: CPT | Mod: HCNC

## 2020-03-13 PROCEDURE — 84100 ASSAY OF PHOSPHORUS: CPT | Mod: HCNC

## 2020-03-13 RX ORDER — MEPERIDINE HYDROCHLORIDE 25 MG/ML
12.5 INJECTION INTRAMUSCULAR; INTRAVENOUS; SUBCUTANEOUS EVERY 4 HOURS PRN
Status: DISPENSED | OUTPATIENT
Start: 2020-03-13 | End: 2020-03-14

## 2020-03-13 RX ADMIN — ONDANSETRON 4 MG: 2 INJECTION INTRAMUSCULAR; INTRAVENOUS at 01:03

## 2020-03-13 RX ADMIN — HYDRALAZINE HYDROCHLORIDE 10 MG: 20 INJECTION INTRAMUSCULAR; INTRAVENOUS at 06:03

## 2020-03-13 RX ADMIN — MEPERIDINE HYDROCHLORIDE 12.5 MG: 25 INJECTION INTRAMUSCULAR; INTRAVENOUS; SUBCUTANEOUS at 01:03

## 2020-03-13 RX ADMIN — PROMETHAZINE HYDROCHLORIDE 6.25 MG: 25 INJECTION INTRAMUSCULAR; INTRAVENOUS at 06:03

## 2020-03-13 RX ADMIN — MEPERIDINE HYDROCHLORIDE 12.5 MG: 25 INJECTION INTRAMUSCULAR; INTRAVENOUS; SUBCUTANEOUS at 06:03

## 2020-03-13 RX ADMIN — FAMOTIDINE 20 MG: 20 INJECTION, SOLUTION INTRAVENOUS at 09:03

## 2020-03-13 RX ADMIN — SODIUM CHLORIDE: 0.9 INJECTION, SOLUTION INTRAVENOUS at 06:03

## 2020-03-13 RX ADMIN — CEFEPIME HYDROCHLORIDE 1 G: 1 INJECTION, SOLUTION INTRAVENOUS at 08:03

## 2020-03-13 NOTE — ASSESSMENT & PLAN NOTE
Will avoid nephrotoxic meds , monitor serum creatinine     3/13  Creatinine 2.7  Renal function as baseline   Monitor

## 2020-03-13 NOTE — ASSESSMENT & PLAN NOTE
71yo M with multiple previous abdominal operations with SBO    - No acute surgical intervention required at this time  - Cont NPO  - Cont NGT to cLWS. Await return of bowel function  - OOB encouraged  - IV fluids  - minimize narcotics  - replete electrolytes PRN

## 2020-03-13 NOTE — SUBJECTIVE & OBJECTIVE
Interval History: No acute events overnight. Pain well controlled. Less distention. Still with some mild abdominal pain. No BM/flatus yet.    Medications:  Continuous Infusions:   sodium chloride 0.9% 100 mL/hr at 03/13/20 0649     Scheduled Meds:   ceFEPime (MAXIPIME) IVPB  1 g Intravenous Q24H    famotidine  20 mg Intravenous Daily     PRN Meds:albuterol-ipratropium, Dextrose 10% Bolus, Dextrose 10% Bolus, glucagon (human recombinant), glucose, glucose, hydrALAZINE, meperidine, ondansetron, promethazine (PHENERGAN) IVPB, sodium chloride 0.9%     Review of patient's allergies indicates:   Allergen Reactions    Morphine Itching     Objective:     Vital Signs (Most Recent):  Temp: 97.7 °F (36.5 °C) (03/13/20 0347)  Pulse: 90 (03/13/20 0455)  Resp: 18 (03/13/20 0347)  BP: (!) 142/66 (03/13/20 0347)  SpO2: 97 % (03/13/20 0347) Vital Signs (24h Range):  Temp:  [97.6 °F (36.4 °C)-98 °F (36.7 °C)] 97.7 °F (36.5 °C)  Pulse:  [] 90  Resp:  [16-20] 18  SpO2:  [95 %-98 %] 97 %  BP: (142-183)/(66-88) 142/66     Weight: 73.3 kg (161 lb 11.2 oz)  Body mass index is 21.33 kg/m².    Intake/Output - Last 3 Shifts       03/11 0700 - 03/12 0659 03/12 0700 - 03/13 0659 03/13 0700 - 03/14 0659    I.V. (mL/kg)  2993.3 (40.8)     IV Piggyback  100     Total Intake(mL/kg)  3093.3 (42.2)     Urine (mL/kg/hr)  400 (0.2)     Drains 300 850     Total Output 300 1250     Net -300 +1843.3            Urine Occurrence  2 x           Physical Exam   Constitutional: He is oriented to person, place, and time. He appears well-developed.   HENT:   Head: Normocephalic and atraumatic.   NG tube in place with bilious output in cannister   Eyes: Conjunctivae and EOM are normal.   Neck: Normal range of motion. No thyromegaly present.   Cardiovascular: Normal rate and regular rhythm.   Pulmonary/Chest: Effort normal. No respiratory distress.   Abdominal:   Soft, mild distention, mild tympany globally; minimally TTP; well-healed previous midline  incision   Musculoskeletal: Normal range of motion. He exhibits no edema or tenderness.   Neurological: He is alert and oriented to person, place, and time.   Skin: Skin is warm and dry. Capillary refill takes less than 2 seconds. No rash noted.   Psychiatric: He has a normal mood and affect.       Significant Labs:  CBC:   Recent Labs   Lab 03/13/20  0552   WBC 9.63   RBC 3.07*   HGB 8.5*   HCT 28.9*      MCV 94   MCH 27.7   MCHC 29.4*     BMP:   Recent Labs   Lab 03/13/20  0552   *      K 5.0      CO2 20*   BUN 48*   CREATININE 2.7*   CALCIUM 9.6   MG 2.5

## 2020-03-13 NOTE — PLAN OF CARE
Patient is current with [facility]. Preference letter obtained. Referral placed in [employer].         03/13/20 1121   Post-Acute Status   Post-Acute Authorization Home Health/Hospice   Home Health/Hospice Status Referrals Sent   Discharge Delays (!) Other

## 2020-03-13 NOTE — SUBJECTIVE & OBJECTIVE
Interval History:     3/13/20: patient reports mild abdominal pain. No BM yet. Renal function stable with creatinine at 2.7.         Review of patient's allergies indicates:   Allergen Reactions    Morphine Itching     Current Facility-Administered Medications   Medication Frequency    0.9%  NaCl infusion Continuous    albuterol-ipratropium 2.5 mg-0.5 mg/3 mL nebulizer solution 3 mL Q8H PRN    cefepime in dextrose 5 % 1 gram/50 mL IVPB 1 g Q24H    dextrose 10% (D10W) Bolus PRN    dextrose 10% (D10W) Bolus PRN    famotidine IVPB 20 mg Daily    glucagon (human recombinant) injection 1 mg PRN    glucose chewable tablet 16 g PRN    glucose chewable tablet 24 g PRN    hydrALAZINE injection 10 mg Q6H PRN    meperidine (PF) injection 12.5 mg Q4H PRN    ondansetron injection 4 mg Q6H PRN    promethazine (PHENERGAN) 6.25 mg in dextrose 5 % 50 mL IVPB Q6H PRN    sodium chloride 0.9% flush 10 mL PRN       Objective:     Vital Signs (Most Recent):  Temp: 97.3 °F (36.3 °C) (03/13/20 0729)  Pulse: 89 (03/13/20 0729)  Resp: 18 (03/13/20 0729)  BP: (!) 167/80 (03/13/20 0729)  SpO2: 96 % (03/13/20 0729)  O2 Device (Oxygen Therapy): room air (03/12/20 0800) Vital Signs (24h Range):  Temp:  [97.3 °F (36.3 °C)-98 °F (36.7 °C)] 97.3 °F (36.3 °C)  Pulse:  [] 89  Resp:  [16-20] 18  SpO2:  [96 %-97 %] 96 %  BP: (142-183)/(66-88) 167/80     Weight: 73.3 kg (161 lb 11.2 oz) (03/11/20 2206)  Body mass index is 21.33 kg/m².  Body surface area is 1.94 meters squared.    I/O last 3 completed shifts:  In: 3093.3 [I.V.:2993.3; IV Piggyback:100]  Out: 1550 [Urine:400; Drains:1150]    Physical Exam   Constitutional: He is oriented to person, place, and time. He appears well-developed. He is cooperative.   HENT:   Head: Normocephalic.   Nose: No rhinorrhea.   Mouth/Throat: Mucous membranes are normal. No oropharyngeal exudate.   NGT in place.    Eyes: Pupils are equal, round, and reactive to light.   Neck: No thyroid mass  present.   Cardiovascular: Normal rate, regular rhythm, S1 normal, S2 normal and intact distal pulses.   Pulmonary/Chest: Effort normal. No respiratory distress. He has no wheezes.   Abdominal: Soft. Bowel sounds are normal. He exhibits no distension. There is tenderness. No hernia.   Musculoskeletal:   Right BKA. Left foot amputation.    Lymphadenopathy:     He has no cervical adenopathy.   Neurological: He is alert and oriented to person, place, and time.   Skin: Skin is warm and dry.   Psychiatric: He has a normal mood and affect.       Significant Labs:  Lab Results   Component Value Date    CREATININE 2.7 (H) 03/13/2020    BUN 48 (H) 03/13/2020     03/13/2020    K 5.0 03/13/2020     03/13/2020    CO2 20 (L) 03/13/2020     Lab Results   Component Value Date    .0 (H) 02/26/2020    CALCIUM 9.6 03/13/2020    PHOS 5.4 (H) 03/13/2020       Lab Results   Component Value Date    ALBUMIN 3.6 03/12/2020     Lab Results   Component Value Date    WBC 9.63 03/13/2020    HGB 8.5 (L) 03/13/2020    HCT 28.9 (L) 03/13/2020    MCV 94 03/13/2020     03/13/2020       Recent Labs   Lab 03/13/20  0552   MG 2.5         Significant Imaging:  Imaging Results          X-Ray Chest AP Portable (Final result)  Result time 03/12/20 09:22:40    Final result by Saji Issa MD (03/12/20 09:22:40)                 Impression:      As above.      Electronically signed by: Saji Issa  Date:    03/12/2020  Time:    09:22             Narrative:    EXAMINATION:  XR CHEST AP PORTABLE    CLINICAL HISTORY:  . Presence of other specified devices    TECHNIQUE:  Single frontal portable view of the chest was performed.    COMPARISON:  02/02/2020    FINDINGS:  Support devices:    Esophagogastric tube in satisfactory position overlying the body of the stomach.    Chronic interstitial coarsening.  Linear reticular interstitial opacification reduced in severity compared to 02/02/2020.  No pneumothorax.  Suggestion of tiny right  pleural effusion unchanged.  Surgical clips right axilla and left cervical region.    The cardiac silhouette is normal in size. The hilar and mediastinal contours are unremarkable.    Bones are intact.                               CT Abdomen Pelvis  Without Contrast (Final result)  Result time 03/12/20 08:01:28    Final result by Aurelio Curtis MD (03/12/20 08:01:28)                 Impression:      Prominent small bowel loops are seen throughout the abdomen with transition suspected within the upper pelvis likely at the site of a bowel anastomosis with decompressed ileum distally.  Small bowel loops are thickened at the level near the obstruction without evidence of pneumatosis.  Findings concerning for bowel obstruction.    Small hiatal hernia containing contrast and mild thickening at the GE junction likely reflects reflux disease.  Thickening of the rugal folds throughout the stomach likely reflects gastritis. Recommend endoscopy follow-up.    All CT scans at this facility use dose modulation, iterative reconstruction and/or weight based dosing when appropriate to reduce radiation dose to as low as reasonably achievable.      Electronically signed by: Aurelio Curtis MD  Date:    03/12/2020  Time:    08:01             Narrative:    EXAMINATION:  CT ABDOMEN PELVIS WITHOUT CONTRAST    CLINICAL HISTORY:  Abdominal pain, unspecified;    TECHNIQUE:  Routine 5 mm non-contrast images of the abdomen and pelvis were done.  Sagittal and coronal reformats were also submitted for interpretation.    COMPARISON:  None    FINDINGS:  No consolidation.  Chronic interstitial thickening suspected at the right lung base.  Acute interstitial pneumonia not excluded.  No pleural effusion or pneumothorax identified heart size is normal.  Severe coronary disease.    The liver is normal in size and attenuation with no focal hepatic abnormality.  Moderate gallbladder distension.  No stones are identified.  No significant intrahepatic  ductal dilatation.  Common bile duct is dilated measuring 11 mm without focal obstruction or stone.    The spleen, pancreas, and adrenal glands are unremarkable.  Benign splenic granuloma are noted.    Kidneys are small in size.  Nonobstructing stone lower pole of the right kidney.  Horseshoe configuration is noted.  Nonobstructing stone lower pole right kidney.  Mild left-sided hydronephrosis double-J ureteral stent is noted in satisfactory position.  Bladder demonstrates mild nonspecific bladder wall thickening which can be seen with cystitis..    Small hiatal hernia containing contrast and mild thickening at the GE junction likely reflects reflux disease.  Thickening of the rugal folds throughout the stomach likely reflects gastritis.  Recommend endoscopy follow-up.  Duodenal diverticulum is seen along the 3rd portion the duodenum.  Prominent small bowel loops are seen throughout the abdomen with transition suspected within the upper pelvis likely at the site of a bowel anastomosis with decompressed ileum distally.  Findings concerning for bowel obstruction.  Large bowel demonstrates fatty infiltration of the wall of the colon likely reflecting chronic inflammatory process.  Mild constipation.  Appendix is not seen..  Moderate diverticulosis seen throughout the descending and sigmoid colon.  No definite evidence for acute diverticulitis.  Trace ascites.  No free air.    There is no evidence of lymph node enlargement in the abdomen or pelvis.  Shotty nodes are seen within the mesentery and retroperitoneum.    The abdominal aorta is small caliber with severe atherosclerotic calcifications.  Subclavian femoral bypass and fem-fem bypass grafts are noted.  Extensive clips are seen in the bilateral groin.    Moderate degenerative changes throughout the lumbar spine.  Chronic right rib fracture deformity involving the 7th rib laterally.  Chronic compression deformities at L3 and L1.                               X-Ray  Abdomen Flat And Erect (Final result)  Result time 03/11/20 22:41:16    Final result by Aurelio Briceno MD (03/11/20 22:41:16)                 Impression:      Dilated loops of small bowel with multiple air-fluid levels consistent with a small-bowel obstruction.  No definite evidence of free air.      Electronically signed by: Aurelio Briceno MD  Date:    03/11/2020  Time:    22:41             Narrative:    EXAMINATION:  XR ABDOMEN FLAT AND ERECT    CLINICAL HISTORY:  Generalized abdominal pain    TECHNIQUE:  Flat and erect AP views of the abdomen were performed.    COMPARISON:  03/27/2019    FINDINGS:  Left ureteral stent.  Multiple air-fluid levels with distended small bowel loops consistent with a small bowel obstruction.

## 2020-03-13 NOTE — ASSESSMENT & PLAN NOTE
69yo M with multiple previous abdominal operations with SBO    - No acute surgical intervention required at this time  - Cont NPO  - Cont NGT to cLWS  - OOB, ambulation encouraged  - IV fluids  - minimize narcotics

## 2020-03-13 NOTE — PLAN OF CARE
Pt complains of generalized abdominal pain. Pain medication is effective. Abdomen is non distended. IV fluids are infusing. No injuries. Will continue to monitor. 12 hour chart check is completed.

## 2020-03-13 NOTE — CONSULTS
[facility] -   General Surgery  Consult Note    Patient Name: Kodi Ribeiro  MRN: [id number]  Code Status: Full Code  Admission Date: 3/11/2020  Hospital Length of Stay: 0 days  Attending Physician: Davonte Patterson MD  Primary Care Provider: Norma Nuñez MD    Patient information was obtained from patient, past medical records and ER records.     Inpatient consult to General surgery  Consult performed by: Leonardo Yang MD  Consult ordered by: Ricardo Red MD        Subjective:     Principal Problem: <principal problem not specified>    History of Present Illness: 70-year-old male with a past medical history significant for anemia, GERD, HTN, HLD, CKD, CAD and previous AAA repair who presented to the [facility] emergency department with abdominal pain, nausea and vomiting x1 day.  Patient states the pain and nausea began while simultaneously, with pain being sharp and moderate in severity.  Patient states that his last bowel movement was 2 days ago.  Workup in emergency department was worrisome for a small-bowel obstruction. Patient was admitted to the medical service and surgery is consulted for evaluation.  Patient denies any current fever, chills, hematochezia or melena.  Of note, patient had a similar episode in 2017 that resolved with non operative management. Multiple previous abdominal operations, including AAA repair, AAA graft excision.     No current facility-administered medications on file prior to encounter.      Current Outpatient Medications on File Prior to Encounter   Medication Sig    albuterol-ipratropium (DUO-NEB) 2.5 mg-0.5 mg/3 mL nebulizer solution Take 3 mLs by nebulization every 8 (eight) hours as needed for Wheezing or Shortness of Breath. Rescue    amLODIPine (NORVASC) 10 MG tablet TAKE 1 TABLET EVERY DAY    atorvastatin (LIPITOR) 40 MG tablet Take 1 tablet (40 mg total) by mouth once daily.    carvedilol (COREG) 25 MG tablet Take 1 tablet (25 mg total) by mouth 2 (two) times  daily with meals.    cetirizine (ZYRTEC) 10 MG tablet Take 10 mg by mouth once daily.    clopidogrel (PLAVIX) 75 mg tablet TAKE 1 TABLET EVERY DAY    ferrous gluconate (FERGON) 324 MG tablet Take 1 tablet (324 mg total) by mouth 2 (two) times daily with meals.    folic acid-vit B6-vit B12 2.5-25-2 mg (FOLBIC OR EQUIV) 2.5-25-2 mg Tab Take 1 tablet by mouth once daily.    furosemide (LASIX) 20 MG tablet Take two tab on Mon/Wed/Friday and one tab on Tues/Thurs/ Sat/Sunday    hydrALAZINE (APRESOLINE) 25 MG tablet Take 1 tablet (25 mg total) by mouth every 12 (twelve) hours.    isosorbide mononitrate (IMDUR) 60 MG 24 hr tablet Take 2 tablets (120 mg total) by mouth once daily.    mupirocin (BACTROBAN) 2 % ointment Apply topically 3 (three) times daily.    omeprazole (PRILOSEC) 20 MG capsule TAKE 1 CAPSULE TWICE DAILY    sertraline (ZOLOFT) 50 MG tablet Take 1 tablet (50 mg total) by mouth once daily.    triamcinolone acetonide 0.1% (KENALOG) 0.1 % ointment Apply topically 2 (two) times daily.    aspirin (ECOTRIN) 81 MG EC tablet Take 1 tablet (81 mg total) by mouth once daily.    nitroglycerin (NITROSTAT) 0.6 MG Subl Place 1 tablet (0.6 mg total) under the tongue every 5 (five) minutes as needed (max 3/ per episode).    OXYGEN-AIR DELIVERY SYSTEMS MISC by Misc.(Non-Drug; Combo Route) route.       Review of patient's allergies indicates:   Allergen Reactions    Morphine Itching       Past Medical History:   Diagnosis Date    Acute on chronic congestive heart failure 1/13/2020    Acute respiratory failure with hypoxia 1/14/2020    Analgesic nephropathy     Anemia     AP (angina pectoris) 1/11/2019    Arthritis     Colon polyp     Repeat colonoscopy due in 9/14    COPD exacerbation 2/6/2020    Coronary artery disease     Diverticulosis     colonoscopy 2/21/2014    Dysthymia 2/13/2020    Encounter for blood transfusion     GERD (gastroesophageal reflux disease)     Hemorrhoids     colonoscopy  2/21/2014    Horseshoe kidney     Hyperglycemia 3/17/2014    Hyperlipidemia     Hypertension     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    Late complications of amputation stump     rseolved with further amputation( MRSA then none since 2014)    Lipoma of colon     colonoscopy 2/21/2014    Myocardial infarction     per patient 2000 & 9/2012    Peripheral vascular disease     Phantom limb syndrome     patient reports only intermittent not problematic, not worsening    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    Spinal cord disease     L4L5 disc    Stroke     Tobacco dependence     resolved    Ureteral stent retained      Past Surgical History:   Procedure Laterality Date    ABDOMINAL AORTIC ANEURYSM REPAIR      ABDOMINAL AORTIC ANEURYSM REPAIR  1996/2014    AMPUTATION, LOWER LIMB      AORTA - BILATERAL FEMORAL ARTERY BYPASS GRAFT  2014    Left and right leg    CORONARY ANGIOPLASTY WITH STENT PLACEMENT  2000    Three placed in heart    CYSTOSCOPY W/ RETROGRADES Left 5/29/2018    Procedure: CYSTOSCOPY, WITH RETROGRADE PYELOGRAM;  Surgeon: Scooter Jin IV, MD;  Location: [facility];  Service: Urology;  Laterality: Left;    CYSTOSCOPY W/ URETERAL STENT PLACEMENT Left 5/29/2018    Procedure: CYSTOSCOPY, WITH URETERAL STENT INSERTION;  Surgeon: Scooter Jin IV, MD;  Location: [facility] OR;  Service: Urology;  Laterality: Left;    CYSTOSCOPY W/ URETERAL STENT PLACEMENT Left 2/4/2020    Procedure: CYSTOSCOPY, WITH URETERAL STENT INSERTION;  Surgeon: Scooter Jin IV, MD;  Location: [facility] OR;  Service: Urology;  Laterality: Left;    CYSTOSCOPY W/ URETERAL STENT REMOVAL Left 5/29/2018    Procedure: CYSTOSCOPY, WITH URETERAL STENT REMOVAL;  Surgeon: Scooter Jin IV, MD;  Location: [facility] OR;  Service: Urology;  Laterality: Left;    CYSTOSCOPY W/ URETERAL STENT REMOVAL Left 2/4/2020    Procedure: CYSTOSCOPY, WITH URETERAL STENT REMOVAL;  Surgeon: Scooter Jin IV, MD;  Location: [facility];   Service: Urology;  Laterality: Left;    FOOT AMPUTATION THROUGH METATARSAL      left    FOOT SURGERY Bilateral     per patient multiple toe amputations prior to.  partial foot amputation:first great toe then other toes     KIDNEY SURGERY      per patient separation of horseshoe kidney @ time of AAA repair    LEFT HEART CATHETERIZATION Left 3/7/2019    Procedure: CATHETERIZATION, HEART, LEFT;  Surgeon: Adriel Boone MD;  Location: [facility] CATH LAB;  Service: Cardiology;  Laterality: Left;  630 admit for IV hydration  10am start    LUNG LOBECTOMY Right     per patient not cancer    right below knee amputation   (approx)    SMALL INTESTINE SURGERY  2014    per patient partial @ time of aaa repair  not small bowel - large bowel bowel compromised bythtwe AAAbowel    TONSILLECTOMY   aprox    URETERAL STENT PLACEMENT Left     annually replaced since  or so  Dr Jin     Family History     Problem Relation (Age of Onset)    COPD Mother    Cancer Mother    Diabetes Daughter    Heart disease Father        Tobacco Use    Smoking status: Former Smoker     Packs/day: 1.00     Years: 15.00     Pack years: 15.00     Last attempt to quit: 2009     Years since quittin.2    Smokeless tobacco: Never Used   Substance and Sexual Activity    Alcohol use: No    Drug use: No     Comment: Is on prescription opiod, no non prescribed use    Sexual activity: Not Currently     Partners: Female     Review of Systems   Constitutional: Positive for activity change and appetite change. Negative for chills, fatigue, fever and unexpected weight change.   HENT: Negative for congestion, ear pain, sore throat and trouble swallowing.    Eyes: Negative for pain, redness and itching.   Respiratory: Negative for cough, shortness of breath and wheezing.    Cardiovascular: Negative for chest pain, palpitations and leg swelling.   Gastrointestinal: Positive for abdominal distention, abdominal pain,  constipation, nausea and vomiting. Negative for anal bleeding, blood in stool, diarrhea and rectal pain.   Endocrine: Negative for cold intolerance, heat intolerance and polyuria.   Genitourinary: Negative for dysuria, flank pain, frequency and hematuria.   Musculoskeletal: Negative for gait problem, joint swelling and neck pain.   Skin: Negative for color change, rash and wound.   Allergic/Immunologic: Negative for environmental allergies and immunocompromised state.   Neurological: Negative for dizziness, speech difficulty, weakness and numbness.   Psychiatric/Behavioral: Negative for agitation, confusion and hallucinations.     Objective:     Vital Signs (Most Recent):  Temp: 97.9 °F (36.6 °C) (03/12/20 2019)  Pulse: 94 (03/12/20 2021)  Resp: 16 (03/12/20 2019)  BP: (!) 183/80 (03/12/20 2021)  SpO2: 97 % (03/12/20 2019) Vital Signs (24h Range):  Temp:  [97.5 °F (36.4 °C)-98 °F (36.7 °C)] 97.9 °F (36.6 °C)  Pulse:  [72-94] 94  Resp:  [11-20] 16  SpO2:  [94 %-100 %] 97 %  BP: (130-183)/(73-88) 183/80     Weight: 73.3 kg (161 lb 11.2 oz)  Body mass index is 21.33 kg/m².    Physical Exam   Constitutional: He is oriented to person, place, and time. He appears well-developed.   HENT:   Head: Normocephalic and atraumatic.   NG tube in place with bilious output in cannister   Eyes: Conjunctivae and EOM are normal.   Neck: Normal range of motion. No thyromegaly present.   Cardiovascular: Normal rate and regular rhythm.   Pulmonary/Chest: Effort normal. No respiratory distress.   Abdominal:   Soft, mild distention, mild tympany globally; minimally TTP; well-healed previous midline incision   Musculoskeletal: Normal range of motion. He exhibits no edema or tenderness.   Neurological: He is alert and oriented to person, place, and time.   Skin: Skin is warm and dry. Capillary refill takes less than 2 seconds. No rash noted.   Psychiatric: He has a normal mood and affect.       Significant Labs:  CBC:   Recent Labs   Lab  03/12/20  1043   WBC 10.26   RBC 2.95*   HGB 8.1*   HCT 27.9*      MCV 95   MCH 27.5   MCHC 29.0*     BMP:   Recent Labs   Lab 03/11/20  2220 03/12/20  1043   * 111*    140   K 4.9 5.3*    106   CO2 18* 24   BUN 39* 43*   CREATININE 2.8* 2.9*   CALCIUM 10.0 9.8   MG 2.2  --      CMP:   Recent Labs   Lab 03/12/20  1043   *   CALCIUM 9.8   ALBUMIN 3.6   PROT 8.1      K 5.3*   CO2 24      BUN 43*   CREATININE 2.9*   ALKPHOS 191*   ALT 14   AST 20   BILITOT 0.3     LFTs:   Recent Labs   Lab 03/12/20  1043   ALT 14   AST 20   ALKPHOS 191*   BILITOT 0.3   PROT 8.1   ALBUMIN 3.6     Coagulation: No results for input(s): LABPROT, INR, APTT in the last 168 hours.    Significant Diagnostics:  I have reviewed all pertinent imaging results/findings within the past 24 hours.     CT:    FINDINGS:  No consolidation.  Chronic interstitial thickening suspected at the right lung base.  Acute interstitial pneumonia not excluded.  No pleural effusion or pneumothorax identified heart size is normal.  Severe coronary disease.    The liver is normal in size and attenuation with no focal hepatic abnormality.  Moderate gallbladder distension.  No stones are identified.  No significant intrahepatic ductal dilatation.  Common bile duct is dilated measuring 11 mm without focal obstruction or stone.    The spleen, pancreas, and adrenal glands are unremarkable.  Benign splenic granuloma are noted.    Kidneys are small in size.  Nonobstructing stone lower pole of the right kidney.  Horseshoe configuration is noted.  Nonobstructing stone lower pole right kidney.  Mild left-sided hydronephrosis double-J ureteral stent is noted in satisfactory position.  Bladder demonstrates mild nonspecific bladder wall thickening which can be seen with cystitis..    Small hiatal hernia containing contrast and mild thickening at the GE junction likely reflects reflux disease.  Thickening of the rugal folds throughout the  stomach likely reflects gastritis.  Recommend endoscopy follow-up.  Duodenal diverticulum is seen along the 3rd portion the duodenum.  Prominent small bowel loops are seen throughout the abdomen with transition suspected within the upper pelvis likely at the site of a bowel anastomosis with decompressed ileum distally.  Findings concerning for bowel obstruction.  Large bowel demonstrates fatty infiltration of the wall of the colon likely reflecting chronic inflammatory process.  Mild constipation.  Appendix is not seen..  Moderate diverticulosis seen throughout the descending and sigmoid colon.  No definite evidence for acute diverticulitis.  Trace ascites.  No free air.    There is no evidence of lymph node enlargement in the abdomen or pelvis.  Shotty nodes are seen within the mesentery and retroperitoneum.    The abdominal aorta is small caliber with severe atherosclerotic calcifications.  Subclavian femoral bypass and fem-fem bypass grafts are noted.  Extensive clips are seen in the bilateral groin.    Moderate degenerative changes throughout the lumbar spine.  Chronic right rib fracture deformity involving the 7th rib laterally.  Chronic compression deformities at L3 and L1.      Impression       Prominent small bowel loops are seen throughout the abdomen with transition suspected within the upper pelvis likely at the site of a bowel anastomosis with decompressed ileum distally.  Small bowel loops are thickened at the level near the obstruction without evidence of pneumatosis.  Findings concerning for bowel obstruction.    Small hiatal hernia containing contrast and mild thickening at the GE junction likely reflects reflux disease.  Thickening of the rugal folds throughout the stomach likely reflects gastritis. Recommend endoscopy follow-up.     Assessment/Plan:     SBO (small bowel obstruction)  71yo M with multiple previous abdominal operations with SBO    - No acute surgical intervention required at this  time  - Cont NPO  - Cont NGT to cLWS  - OOB, ambulation encouraged  - IV fluids  - minimize narcotics    CKD (chronic kidney disease) stage 4, GFR 15-29 ml/min  Management per primary team    Anemia  Management per primary team    Essential hypertension  Management per primary team      VTE Risk Mitigation (From admission, onward)         Ordered     IP VTE HIGH RISK PATIENT  Once      03/12/20 0025     Reason for no Mechanical VTE Prophylaxis  Once     Question:  Reasons:  Answer:  Physician Provided (leave comment)    03/12/20 0025     Place sequential compression device  Until discontinued      03/12/20 0025                Thank you for your consult. I will follow-up with patient. Please contact us if you have any additional questions.    Leonardo Yang MD  General Surgery  [facility] -

## 2020-03-13 NOTE — PROGRESS NOTES
[facility] -   Nephrology  Progress Note    Patient Name: Kodi Ribeiro  MRN: [id number]  Admission Date: 3/11/2020  Hospital Length of Stay: 1 days  Attending Provider: Davonte Patterson MD   Primary Care Physician: Norma Nuñez MD  Principal Problem:<principal problem not specified>    Subjective:     HPI: Kodi Ribeiro is a  70-year-old  man  with history of hypertension , chronic kidney disease stage 4.  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 No BM yet. Renal function stable with creatinine at 2.7.         Review of patient's allergies indicates:   Allergen Reactions    Morphine Itching     Current Facility-Administered Medications   Medication Frequency    0.9%  NaCl infusion Continuous    albuterol-ipratropium 2.5 mg-0.5 mg/3 mL nebulizer solution 3 mL Q8H PRN    cefepime in dextrose 5 % 1 gram/50 mL IVPB 1 g Q24H    dextrose 10% (D10W) Bolus PRN    dextrose 10% (D10W) Bolus PRN    famotidine IVPB 20 mg Daily    glucagon (human recombinant) injection 1 mg PRN    glucose chewable tablet 16 g PRN    glucose chewable tablet 24 g PRN    hydrALAZINE injection 10 mg Q6H PRN    meperidine (PF) injection 12.5 mg Q4H PRN    ondansetron injection 4 mg Q6H PRN    promethazine (PHENERGAN) 6.25 mg in dextrose 5 % 50 mL IVPB Q6H PRN    sodium chloride 0.9% flush 10 mL PRN       Objective:     Vital Signs (Most Recent):  Temp: 97.3 °F (36.3 °C) (03/13/20 0729)  Pulse: 89 (03/13/20 0729)  Resp: 18  (03/13/20 0729)  BP: (!) 167/80 (03/13/20 0729)  SpO2: 96 % (03/13/20 0729)  O2 Device (Oxygen Therapy): room air (03/12/20 0800) Vital Signs (24h Range):  Temp:  [97.3 °F (36.3 °C)-98 °F (36.7 °C)] 97.3 °F (36.3 °C)  Pulse:  [] 89  Resp:  [16-20] 18  SpO2:  [96 %-97 %] 96 %  BP: (142-183)/(66-88) 167/80     Weight: 73.3 kg (161 lb 11.2 oz) (03/11/20 2206)  Body mass index is 21.33 kg/m².  Body surface area is 1.94 meters squared.    I/O last 3 completed shifts:  In: 3093.3 [I.V.:2993.3; IV Piggyback:100]  Out: 1550 [Urine:400; Drains:1150]    Physical Exam   Constitutional: He is oriented to person, place, and time. He appears well-developed. He is cooperative.   HENT:   Head: Normocephalic.   Nose: No rhinorrhea.   Mouth/Throat: Mucous membranes are normal. No oropharyngeal exudate.   NGT in place.    Eyes: Pupils are equal, round, and reactive to light.   Neck: No thyroid mass present.   Cardiovascular: Normal rate, regular rhythm, S1 normal, S2 normal and intact distal pulses.   Pulmonary/Chest: Effort normal. No respiratory distress. He has no wheezes.   Abdominal: Soft. Bowel sounds are normal. He exhibits no distension. There is tenderness. No hernia.   Musculoskeletal:   Right BKA. Left foot amputation.    Lymphadenopathy:     He has no cervical adenopathy.   Neurological: He is alert and oriented to person, place, and time.   Skin: Skin is warm and dry.   Psychiatric: He has a normal mood and affect.       Significant Labs:  Lab Results   Component Value Date    CREATININE 2.7 (H) 03/13/2020    BUN 48 (H) 03/13/2020     03/13/2020    K 5.0 03/13/2020     03/13/2020    CO2 20 (L) 03/13/2020     Lab Results   Component Value Date    .0 (H) 02/26/2020    CALCIUM 9.6 03/13/2020    PHOS 5.4 (H) 03/13/2020       Lab Results   Component Value Date    ALBUMIN 3.6 03/12/2020     Lab Results   Component Value Date    WBC 9.63 03/13/2020    HGB 8.5 (L) 03/13/2020    HCT 28.9 (L) 03/13/2020     MCV 94 03/13/2020     03/13/2020       Recent Labs   Lab 03/13/20  0552   MG 2.5         Significant Imaging:  Imaging Results          X-Ray Chest AP Portable (Final result)  Result time 03/12/20 09:22:40    Final result by Saji Issa MD (03/12/20 09:22:40)                 Impression:      As above.      Electronically signed by: Saji Issa  Date:    03/12/2020  Time:    09:22             Narrative:    EXAMINATION:  XR CHEST AP PORTABLE    CLINICAL HISTORY:  . Presence of other specified devices    TECHNIQUE:  Single frontal portable view of the chest was performed.    COMPARISON:  02/02/2020    FINDINGS:  Support devices:    Esophagogastric tube in satisfactory position overlying the body of the stomach.    Chronic interstitial coarsening.  Linear reticular interstitial opacification reduced in severity compared to 02/02/2020.  No pneumothorax.  Suggestion of tiny right pleural effusion unchanged.  Surgical clips right axilla and left cervical region.    The cardiac silhouette is normal in size. The hilar and mediastinal contours are unremarkable.    Bones are intact.                               CT Abdomen Pelvis  Without Contrast (Final result)  Result time 03/12/20 08:01:28    Final result by Aurelio Curtis MD (03/12/20 08:01:28)                 Impression:      Prominent small bowel loops are seen throughout the abdomen with transition suspected within the upper pelvis likely at the site of a bowel anastomosis with decompressed ileum distally.  Small bowel loops are thickened at the level near the obstruction without evidence of pneumatosis.  Findings concerning for bowel obstruction.    Small hiatal hernia containing contrast and mild thickening at the GE junction likely reflects reflux disease.  Thickening of the rugal folds throughout the stomach likely reflects gastritis. Recommend endoscopy follow-up.    All CT scans at this facility use dose modulation, iterative reconstruction and/or  weight based dosing when appropriate to reduce radiation dose to as low as reasonably achievable.      Electronically signed by: Aurelio Curtis MD  Date:    03/12/2020  Time:    08:01             Narrative:    EXAMINATION:  CT ABDOMEN PELVIS WITHOUT CONTRAST    CLINICAL HISTORY:  Abdominal pain, unspecified;    TECHNIQUE:  Routine 5 mm non-contrast images of the abdomen and pelvis were done.  Sagittal and coronal reformats were also submitted for interpretation.    COMPARISON:  None    FINDINGS:  No consolidation.  Chronic interstitial thickening suspected at the right lung base.  Acute interstitial pneumonia not excluded.  No pleural effusion or pneumothorax identified heart size is normal.  Severe coronary disease.    The liver is normal in size and attenuation with no focal hepatic abnormality.  Moderate gallbladder distension.  No stones are identified.  No significant intrahepatic ductal dilatation.  Common bile duct is dilated measuring 11 mm without focal obstruction or stone.    The spleen, pancreas, and adrenal glands are unremarkable.  Benign splenic granuloma are noted.    Kidneys are small in size.  Nonobstructing stone lower pole of the right kidney.  Horseshoe configuration is noted.  Nonobstructing stone lower pole right kidney.  Mild left-sided hydronephrosis double-J ureteral stent is noted in satisfactory position.  Bladder demonstrates mild nonspecific bladder wall thickening which can be seen with cystitis..    Small hiatal hernia containing contrast and mild thickening at the GE junction likely reflects reflux disease.  Thickening of the rugal folds throughout the stomach likely reflects gastritis.  Recommend endoscopy follow-up.  Duodenal diverticulum is seen along the 3rd portion the duodenum.  Prominent small bowel loops are seen throughout the abdomen with transition suspected within the upper pelvis likely at the site of a bowel anastomosis with decompressed ileum distally.  Findings  concerning for bowel obstruction.  Large bowel demonstrates fatty infiltration of the wall of the colon likely reflecting chronic inflammatory process.  Mild constipation.  Appendix is not seen..  Moderate diverticulosis seen throughout the descending and sigmoid colon.  No definite evidence for acute diverticulitis.  Trace ascites.  No free air.    There is no evidence of lymph node enlargement in the abdomen or pelvis.  Shotty nodes are seen within the mesentery and retroperitoneum.    The abdominal aorta is small caliber with severe atherosclerotic calcifications.  Subclavian femoral bypass and fem-fem bypass grafts are noted.  Extensive clips are seen in the bilateral groin.    Moderate degenerative changes throughout the lumbar spine.  Chronic right rib fracture deformity involving the 7th rib laterally.  Chronic compression deformities at L3 and L1.                               X-Ray Abdomen Flat And Erect (Final result)  Result time 03/11/20 22:41:16    Final result by Aurelio Briceno MD (03/11/20 22:41:16)                 Impression:      Dilated loops of small bowel with multiple air-fluid levels consistent with a small-bowel obstruction.  No definite evidence of free air.      Electronically signed by: Aurelio Briceno MD  Date:    03/11/2020  Time:    22:41             Narrative:    EXAMINATION:  XR ABDOMEN FLAT AND ERECT    CLINICAL HISTORY:  Generalized abdominal pain    TECHNIQUE:  Flat and erect AP views of the abdomen were performed.    COMPARISON:  03/27/2019    FINDINGS:  Left ureteral stent.  Multiple air-fluid levels with distended small bowel loops consistent with a small bowel obstruction.                                  Assessment/Plan:     CKD (chronic kidney disease) stage 4, GFR 15-29 ml/min  1. CKD stage 4 :  Patient baseline serum creatinine fluctuates between 2 and 3 mg/dL, CT scan of the abdomen shows mild hydronephrosis on the left side, patient has history of ureteric stricture and stent  placement on that side, also has nonobstructing stones bilaterally, monitor renal function closely, avoid ACE inhibitors/ARB/NSAIDs at this time, continue gentle hydration as he is on NPO status, creatinine at 2.7 today.     2.  Hypertension - has been fluctuating. Make adjustments as needed.    3.  Small bowel obstruction - management per General surgery    4.  Anemia - multifactorial, monitor hemoglobin, PRBC transfusion when indicated.     5.  Electrolytes: Borderline hyperphosphatemia. Monitor.             Thank you for your consult. I will follow-up with patient. Please contact us if you have any additional questions.    Thad Padilla MD  Nephrology  [facility] -

## 2020-03-13 NOTE — ASSESSMENT & PLAN NOTE
1. CKD stage 4 :  Patient baseline serum creatinine fluctuates between 2 and 3 mg/dL, CT scan of the abdomen shows mild hydronephrosis on the left side, patient has history of ureteric stricture and stent placement on that side, also has nonobstructing stones bilaterally, monitor renal function closely, avoid ACE inhibitors/ARB/NSAIDs at this time, continue gentle hydration as he is on NPO status, creatinine at 2.7 today.     2.  Hypertension - has been fluctuating. Make adjustments as needed.    3.  Small bowel obstruction - management per General surgery    4.  Anemia - multifactorial, monitor hemoglobin, PRBC transfusion when indicated.     5.  Electrolytes: Borderline hyperphosphatemia. Monitor.

## 2020-03-13 NOTE — SUBJECTIVE & OBJECTIVE
No current facility-administered medications on file prior to encounter.      Current Outpatient Medications on File Prior to Encounter   Medication Sig    albuterol-ipratropium (DUO-NEB) 2.5 mg-0.5 mg/3 mL nebulizer solution Take 3 mLs by nebulization every 8 (eight) hours as needed for Wheezing or Shortness of Breath. Rescue    amLODIPine (NORVASC) 10 MG tablet TAKE 1 TABLET EVERY DAY    atorvastatin (LIPITOR) 40 MG tablet Take 1 tablet (40 mg total) by mouth once daily.    carvedilol (COREG) 25 MG tablet Take 1 tablet (25 mg total) by mouth 2 (two) times daily with meals.    cetirizine (ZYRTEC) 10 MG tablet Take 10 mg by mouth once daily.    clopidogrel (PLAVIX) 75 mg tablet TAKE 1 TABLET EVERY DAY    ferrous gluconate (FERGON) 324 MG tablet Take 1 tablet (324 mg total) by mouth 2 (two) times daily with meals.    folic acid-vit B6-vit B12 2.5-25-2 mg (FOLBIC OR EQUIV) 2.5-25-2 mg Tab Take 1 tablet by mouth once daily.    furosemide (LASIX) 20 MG tablet Take two tab on Mon/Wed/Friday and one tab on Tues/Thurs/ Sat/Sunday    hydrALAZINE (APRESOLINE) 25 MG tablet Take 1 tablet (25 mg total) by mouth every 12 (twelve) hours.    isosorbide mononitrate (IMDUR) 60 MG 24 hr tablet Take 2 tablets (120 mg total) by mouth once daily.    mupirocin (BACTROBAN) 2 % ointment Apply topically 3 (three) times daily.    omeprazole (PRILOSEC) 20 MG capsule TAKE 1 CAPSULE TWICE DAILY    sertraline (ZOLOFT) 50 MG tablet Take 1 tablet (50 mg total) by mouth once daily.    triamcinolone acetonide 0.1% (KENALOG) 0.1 % ointment Apply topically 2 (two) times daily.    aspirin (ECOTRIN) 81 MG EC tablet Take 1 tablet (81 mg total) by mouth once daily.    nitroglycerin (NITROSTAT) 0.6 MG Subl Place 1 tablet (0.6 mg total) under the tongue every 5 (five) minutes as needed (max 3/ per episode).    OXYGEN-AIR DELIVERY SYSTEMS MISC by Misc.(Non-Drug; Combo Route) route.       Review of patient's allergies indicates:   Allergen  Reactions    Morphine Itching       Past Medical History:   Diagnosis Date    Acute on chronic congestive heart failure 1/13/2020    Acute respiratory failure with hypoxia 1/14/2020    Analgesic nephropathy     Anemia     AP (angina pectoris) 1/11/2019    Arthritis     Colon polyp     Repeat colonoscopy due in 9/14    COPD exacerbation 2/6/2020    Coronary artery disease     Diverticulosis     colonoscopy 2/21/2014    Dysthymia 2/13/2020    Encounter for blood transfusion     GERD (gastroesophageal reflux disease)     Hemorrhoids     colonoscopy 2/21/2014    Horseshoe kidney     Hyperglycemia 3/17/2014    Hyperlipidemia     Hypertension     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    Late complications of amputation stump     rseolved with further amputation( MRSA then none since 2014)    Lipoma of colon     colonoscopy 2/21/2014    Myocardial infarction     per patient 2000 & 9/2012    Peripheral vascular disease     Phantom limb syndrome     patient reports only intermittent not problematic, not worsening    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    Spinal cord disease     L4L5 disc    Stroke     Tobacco dependence     resolved    Ureteral stent retained      Past Surgical History:   Procedure Laterality Date    ABDOMINAL AORTIC ANEURYSM REPAIR      ABDOMINAL AORTIC ANEURYSM REPAIR  1996/2014    AMPUTATION, LOWER LIMB      AORTA - BILATERAL FEMORAL ARTERY BYPASS GRAFT  2014    Left and right leg    CORONARY ANGIOPLASTY WITH STENT PLACEMENT  2000    Three placed in heart    CYSTOSCOPY W/ RETROGRADES Left 5/29/2018    Procedure: CYSTOSCOPY, WITH RETROGRADE PYELOGRAM;  Surgeon: Scooter Jin IV, MD;  Location: [facility] OR;  Service: Urology;  Laterality: Left;    CYSTOSCOPY W/ URETERAL STENT PLACEMENT Left 5/29/2018    Procedure: CYSTOSCOPY, WITH URETERAL STENT INSERTION;  Surgeon: Scooter Jin IV, MD;  Location: [facility] OR;  Service: Urology;  Laterality: Left;    CYSTOSCOPY  W/ URETERAL STENT PLACEMENT Left 2020    Procedure: CYSTOSCOPY, WITH URETERAL STENT INSERTION;  Surgeon: Scooter Jin IV, MD;  Location: [facility] OR;  Service: Urology;  Laterality: Left;    CYSTOSCOPY W/ URETERAL STENT REMOVAL Left 2018    Procedure: CYSTOSCOPY, WITH URETERAL STENT REMOVAL;  Surgeon: Scooter Jin IV, MD;  Location: [facility] OR;  Service: Urology;  Laterality: Left;    CYSTOSCOPY W/ URETERAL STENT REMOVAL Left 2020    Procedure: CYSTOSCOPY, WITH URETERAL STENT REMOVAL;  Surgeon: Scooter Jin IV, MD;  Location: [facility] OR;  Service: Urology;  Laterality: Left;    FOOT AMPUTATION THROUGH METATARSAL      left    FOOT SURGERY Bilateral     per patient multiple toe amputations prior to.  partial foot amputation:first great toe then other toes     KIDNEY SURGERY  2014    per patient separation of horseshoe kidney @ time of AAA repair    LEFT HEART CATHETERIZATION Left 3/7/2019    Procedure: CATHETERIZATION, HEART, LEFT;  Surgeon: Adriel Boone MD;  Location: [facility] CATH LAB;  Service: Cardiology;  Laterality: Left;  630 admit for IV hydration  10am start    LUNG LOBECTOMY Right     per patient not cancer    right below knee amputation   (approx)    SMALL INTESTINE SURGERY      per patient partial @ time of aaa repair  not small bowel - large bowel bowel compromised bythtwe AAAbowel    TONSILLECTOMY   aprox    URETERAL STENT PLACEMENT Left     annually replaced since 2012 or so  Dr Jin     Family History     Problem Relation (Age of Onset)    COPD Mother    Cancer Mother    Diabetes Daughter    Heart disease Father        Tobacco Use    Smoking status: Former Smoker     Packs/day: 1.00     Years: 15.00     Pack years: 15.00     Last attempt to quit: 2009     Years since quittin.2    Smokeless tobacco: Never Used   Substance and Sexual Activity    Alcohol use: No    Drug use: No     Comment: Is on prescription opiod, no non prescribed  use    Sexual activity: Not Currently     Partners: Female     Review of Systems   Constitutional: Positive for activity change and appetite change. Negative for chills, fatigue, fever and unexpected weight change.   HENT: Negative for congestion, ear pain, sore throat and trouble swallowing.    Eyes: Negative for pain, redness and itching.   Respiratory: Negative for cough, shortness of breath and wheezing.    Cardiovascular: Negative for chest pain, palpitations and leg swelling.   Gastrointestinal: Positive for abdominal distention, abdominal pain, constipation, nausea and vomiting. Negative for anal bleeding, blood in stool, diarrhea and rectal pain.   Endocrine: Negative for cold intolerance, heat intolerance and polyuria.   Genitourinary: Negative for dysuria, flank pain, frequency and hematuria.   Musculoskeletal: Negative for gait problem, joint swelling and neck pain.   Skin: Negative for color change, rash and wound.   Allergic/Immunologic: Negative for environmental allergies and immunocompromised state.   Neurological: Negative for dizziness, speech difficulty, weakness and numbness.   Psychiatric/Behavioral: Negative for agitation, confusion and hallucinations.     Objective:     Vital Signs (Most Recent):  Temp: 97.9 °F (36.6 °C) (03/12/20 2019)  Pulse: 94 (03/12/20 2021)  Resp: 16 (03/12/20 2019)  BP: (!) 183/80 (03/12/20 2021)  SpO2: 97 % (03/12/20 2019) Vital Signs (24h Range):  Temp:  [97.5 °F (36.4 °C)-98 °F (36.7 °C)] 97.9 °F (36.6 °C)  Pulse:  [72-94] 94  Resp:  [11-20] 16  SpO2:  [94 %-100 %] 97 %  BP: (130-183)/(73-88) 183/80     Weight: 73.3 kg (161 lb 11.2 oz)  Body mass index is 21.33 kg/m².    Physical Exam   Constitutional: He is oriented to person, place, and time. He appears well-developed.   HENT:   Head: Normocephalic and atraumatic.   NG tube in place with bilious output in cannister   Eyes: Conjunctivae and EOM are normal.   Neck: Normal range of motion. No thyromegaly present.    Cardiovascular: Normal rate and regular rhythm.   Pulmonary/Chest: Effort normal. No respiratory distress.   Abdominal:   Soft, mild distention, mild tympany globally; minimally TTP; well-healed previous midline incision   Musculoskeletal: Normal range of motion. He exhibits no edema or tenderness.   Neurological: He is alert and oriented to person, place, and time.   Skin: Skin is warm and dry. Capillary refill takes less than 2 seconds. No rash noted.   Psychiatric: He has a normal mood and affect.       Significant Labs:  CBC:   Recent Labs   Lab 03/12/20  1043   WBC 10.26   RBC 2.95*   HGB 8.1*   HCT 27.9*      MCV 95   MCH 27.5   MCHC 29.0*     BMP:   Recent Labs   Lab 03/11/20  2220 03/12/20  1043   * 111*    140   K 4.9 5.3*    106   CO2 18* 24   BUN 39* 43*   CREATININE 2.8* 2.9*   CALCIUM 10.0 9.8   MG 2.2  --      CMP:   Recent Labs   Lab 03/12/20  1043   *   CALCIUM 9.8   ALBUMIN 3.6   PROT 8.1      K 5.3*   CO2 24      BUN 43*   CREATININE 2.9*   ALKPHOS 191*   ALT 14   AST 20   BILITOT 0.3     LFTs:   Recent Labs   Lab 03/12/20  1043   ALT 14   AST 20   ALKPHOS 191*   BILITOT 0.3   PROT 8.1   ALBUMIN 3.6     Coagulation: No results for input(s): LABPROT, INR, APTT in the last 168 hours.    Significant Diagnostics:  I have reviewed all pertinent imaging results/findings within the past 24 hours.     CT:    FINDINGS:  No consolidation.  Chronic interstitial thickening suspected at the right lung base.  Acute interstitial pneumonia not excluded.  No pleural effusion or pneumothorax identified heart size is normal.  Severe coronary disease.    The liver is normal in size and attenuation with no focal hepatic abnormality.  Moderate gallbladder distension.  No stones are identified.  No significant intrahepatic ductal dilatation.  Common bile duct is dilated measuring 11 mm without focal obstruction or stone.    The spleen, pancreas, and adrenal glands are  unremarkable.  Benign splenic granuloma are noted.    Kidneys are small in size.  Nonobstructing stone lower pole of the right kidney.  Horseshoe configuration is noted.  Nonobstructing stone lower pole right kidney.  Mild left-sided hydronephrosis double-J ureteral stent is noted in satisfactory position.  Bladder demonstrates mild nonspecific bladder wall thickening which can be seen with cystitis..    Small hiatal hernia containing contrast and mild thickening at the GE junction likely reflects reflux disease.  Thickening of the rugal folds throughout the stomach likely reflects gastritis.  Recommend endoscopy follow-up.  Duodenal diverticulum is seen along the 3rd portion the duodenum.  Prominent small bowel loops are seen throughout the abdomen with transition suspected within the upper pelvis likely at the site of a bowel anastomosis with decompressed ileum distally.  Findings concerning for bowel obstruction.  Large bowel demonstrates fatty infiltration of the wall of the colon likely reflecting chronic inflammatory process.  Mild constipation.  Appendix is not seen..  Moderate diverticulosis seen throughout the descending and sigmoid colon.  No definite evidence for acute diverticulitis.  Trace ascites.  No free air.    There is no evidence of lymph node enlargement in the abdomen or pelvis.  Shotty nodes are seen within the mesentery and retroperitoneum.    The abdominal aorta is small caliber with severe atherosclerotic calcifications.  Subclavian femoral bypass and fem-fem bypass grafts are noted.  Extensive clips are seen in the bilateral groin.    Moderate degenerative changes throughout the lumbar spine.  Chronic right rib fracture deformity involving the 7th rib laterally.  Chronic compression deformities at L3 and L1.      Impression       Prominent small bowel loops are seen throughout the abdomen with transition suspected within the upper pelvis likely at the site of a bowel anastomosis with  decompressed ileum distally.  Small bowel loops are thickened at the level near the obstruction without evidence of pneumatosis.  Findings concerning for bowel obstruction.    Small hiatal hernia containing contrast and mild thickening at the GE junction likely reflects reflux disease.  Thickening of the rugal folds throughout the stomach likely reflects gastritis. Recommend endoscopy follow-up.

## 2020-03-13 NOTE — PLAN OF CARE
Pt remains free from falls/injuries this shift. Safety precautions maintained. Pain managed with oral meds. No s/s of acute distress noted. Will continue to monitor. Chart check completed.

## 2020-03-13 NOTE — HOSPITAL COURSE
Mr Ribeiro is a 70 year old male admitted with small bowl obstruction, general surgery following. He continues to be NPO with NGT intact to low suction. Vital signs and labs stable. 3/14/20 The patient reports improvement in symptoms overnight, no further abd distension noted. The patient reports having a large BM overnight. + bowel sounds. NG tube D/Cd and diet advanced to clear liquid. 3/15/20 Hgb 6.5 today, will transfuse 1 unit PRBC. Repeat imaging today, consistent with partial small bowel obstruction. General surgery recommending small bowel follow through in am. NPO after MN. 3/16/20 The patient underwent a small bowel follow through today which was negative. Will resume diet. General surgery following. Possible discharge home tomorrow if he continues to improve. 3/17/20 No acute issues overnight. The patients diet was advanced to regular and he tolerated well. The patient was seen and examined today and deemed stable for discharge. He will follow up with his PCP and with General surgery.

## 2020-03-13 NOTE — PROGRESS NOTES
[facility] -   General Surgery  Progress Note    Subjective:     History of Present Illness:  70-year-old male with a past medical history significant for anemia, GERD, HTN, HLD, CKD, CAD and previous AAA repair who presented to the [facility] emergency department with abdominal pain, nausea and vomiting x1 day.  Patient states the pain and nausea began while simultaneously, with pain being sharp and moderate in severity.  Patient states that his last bowel movement was 2 days ago.  Workup in emergency department was worrisome for a small-bowel obstruction. Patient was admitted to the medical service and surgery is consulted for evaluation.  Patient denies any current fever, chills, hematochezia or melena.  Of note, patient had a similar episode in 2017 that resolved with non operative management. Multiple previous abdominal operations, including AAA repair, AAA graft excision.     Post-Op Info:  * No surgery found *         Interval History: No acute events overnight. Pain well controlled. Less distention. Still with some mild abdominal pain. No BM/flatus yet.    Medications:  Continuous Infusions:   sodium chloride 0.9% 100 mL/hr at 03/13/20 0649     Scheduled Meds:   ceFEPime (MAXIPIME) IVPB  1 g Intravenous Q24H    famotidine  20 mg Intravenous Daily     PRN Meds:albuterol-ipratropium, Dextrose 10% Bolus, Dextrose 10% Bolus, glucagon (human recombinant), glucose, glucose, hydrALAZINE, meperidine, ondansetron, promethazine (PHENERGAN) IVPB, sodium chloride 0.9%     Review of patient's allergies indicates:   Allergen Reactions    Morphine Itching     Objective:     Vital Signs (Most Recent):  Temp: 97.7 °F (36.5 °C) (03/13/20 0347)  Pulse: 90 (03/13/20 0455)  Resp: 18 (03/13/20 0347)  BP: (!) 142/66 (03/13/20 0347)  SpO2: 97 % (03/13/20 0347) Vital Signs (24h Range):  Temp:  [97.6 °F (36.4 °C)-98 °F (36.7 °C)] 97.7 °F (36.5 °C)  Pulse:  [] 90  Resp:  [16-20] 18  SpO2:  [95 %-98 %] 97 %  BP:  (142-183)/(66-88) 142/66     Weight: 73.3 kg (161 lb 11.2 oz)  Body mass index is 21.33 kg/m².    Intake/Output - Last 3 Shifts       03/11 0700 - 03/12 0659 03/12 0700 - 03/13 0659 03/13 0700 - 03/14 0659    I.V. (mL/kg)  2993.3 (40.8)     IV Piggyback  100     Total Intake(mL/kg)  3093.3 (42.2)     Urine (mL/kg/hr)  400 (0.2)     Drains 300 850     Total Output 300 1250     Net -300 +1843.3            Urine Occurrence  2 x           Physical Exam   Constitutional: He is oriented to person, place, and time. He appears well-developed.   HENT:   Head: Normocephalic and atraumatic.   NG tube in place with bilious output in cannister   Eyes: Conjunctivae and EOM are normal.   Neck: Normal range of motion. No thyromegaly present.   Cardiovascular: Normal rate and regular rhythm.   Pulmonary/Chest: Effort normal. No respiratory distress.   Abdominal:   Soft, mild distention, mild tympany globally; minimally TTP; well-healed previous midline incision   Musculoskeletal: Normal range of motion. He exhibits no edema or tenderness.   Neurological: He is alert and oriented to person, place, and time.   Skin: Skin is warm and dry. Capillary refill takes less than 2 seconds. No rash noted.   Psychiatric: He has a normal mood and affect.       Significant Labs:  CBC:   Recent Labs   Lab 03/13/20  0552   WBC 9.63   RBC 3.07*   HGB 8.5*   HCT 28.9*      MCV 94   MCH 27.7   MCHC 29.4*     BMP:   Recent Labs   Lab 03/13/20  0552   *      K 5.0      CO2 20*   BUN 48*   CREATININE 2.7*   CALCIUM 9.6   MG 2.5     Assessment/Plan:     SBO (small bowel obstruction)  69yo M with multiple previous abdominal operations with SBO    - No acute surgical intervention required at this time  - Cont NPO  - Cont NGT to cLWS. Await return of bowel function  - OOB encouraged  - IV fluids  - minimize narcotics  - replete electrolytes PRN    CKD (chronic kidney disease) stage 4, GFR 15-29 ml/min  Management per primary  team    Anemia  Management per primary team    Essential hypertension  Management per primary team        Leonardo Yang MD  General Surgery  [facility]

## 2020-03-13 NOTE — PROGRESS NOTES
[facility] Hospital Medicine  Progress Note    Patient Name: Kodi Ribeiro  MRN: [id number]  Patient Class: IP- Inpatient   Admission Date: 3/11/2020  Length of Stay: 1 days  Attending Physician: Davonte Patterson MD  Primary Care Provider: Norma Nuñez MD        Subjective:     Principal Problem:SBO (small bowel obstruction)        HPI:    70 y.o. male patient with a PMHx of anemia, GERD, HTN, HLD, MI  ,previous recurrent abdominal surgery for AAA repair who presented with abdominal pain/vomiting that worsened on the day of admit . Pain was described as sharp, 6/10 in intensity .  Since admission, CT scan of the abdomen showed     Per [facility] radiology, pt's CT results:   1. Reticulonodular densities in the R lower lung may represent an infectious/inflammatory process.   2. Dilated fluid and gas-filled small bowel loops with transition point in LLQ, compatible with SBO.  3. Trace ascites.   4. Mild L hydroureteronephrosis with L ureteral stent noted. No definite obstructing stone identified.     Gen surgery was notified in the ED. He had a previous episode of SBO in 2017 which resolved without surgery .His wife is by bedside. He has NG tube in situ.     Overview/Hospital Course:  Mr Ribeiro is a 70 year old male admitted with small bowl obstruction, general surgery following. He continues to be NPO with NGT intact to low suction. Vital signs and labs stable.     Interval History: Pt seen and examined. Small bowl obstruction, continue NPO and NGT to LIWS. Will follow.     Review of Systems   Constitutional: Positive for appetite change (decreased). Negative for chills, diaphoresis, fatigue and fever.   HENT: Negative for congestion, ear discharge, rhinorrhea, sinus pressure, sore throat and trouble swallowing.    Eyes: Negative for discharge and visual disturbance.   Respiratory: Negative for apnea, cough, choking, chest tightness, shortness of breath, wheezing and stridor.    Cardiovascular: Negative for  chest pain, palpitations and leg swelling.   Gastrointestinal: Positive for abdominal pain and nausea. Negative for abdominal distention, diarrhea and vomiting.   Endocrine: Negative for cold intolerance and heat intolerance.   Genitourinary: Negative for dysuria, frequency and hematuria.   Musculoskeletal: Negative for arthralgias, back pain, myalgias and neck pain.   Skin: Negative for pallor and rash.   Neurological: Negative for dizziness, seizures, syncope, weakness and headaches.   Psychiatric/Behavioral: Negative for agitation, confusion and sleep disturbance.     Objective:     Vital Signs (Most Recent):  Temp: 97.3 °F (36.3 °C) (03/13/20 0729)  Pulse: 89 (03/13/20 0729)  Resp: 18 (03/13/20 0729)  BP: (!) 167/80 (03/13/20 0729)  SpO2: 96 % (03/13/20 0729) Vital Signs (24h Range):  Temp:  [97.3 °F (36.3 °C)-98 °F (36.7 °C)] 97.3 °F (36.3 °C)  Pulse:  [] 89  Resp:  [16-20] 18  SpO2:  [96 %-97 %] 96 %  BP: (142-183)/(66-88) 167/80     Weight: 73.3 kg (161 lb 11.2 oz)  Body mass index is 21.33 kg/m².    Intake/Output Summary (Last 24 hours) at 3/13/2020 1216  Last data filed at 3/13/2020 0655  Gross per 24 hour   Intake 3093.33 ml   Output 1250 ml   Net 1843.33 ml      Physical Exam   Constitutional: He is oriented to person, place, and time. No distress.   HENT:   Mouth/Throat: No oropharyngeal exudate.   Eyes: Right eye exhibits no discharge. Left eye exhibits no discharge.   Neck: No JVD present.   Cardiovascular: Exam reveals no gallop and no friction rub.   No murmur heard.  Pulmonary/Chest: No stridor. No respiratory distress. He has no wheezes. He has no rales. He exhibits no tenderness.   Abdominal: He exhibits no distension and no mass. There is no tenderness. There is no rebound and no guarding. No hernia.   NGT low wall suction    Musculoskeletal: He exhibits no edema or deformity.   Neurological: He is alert and oriented to person, place, and time.   Skin: Skin is warm and dry. Capillary refill  takes less than 2 seconds. He is not diaphoretic.   Nursing note and vitals reviewed.      Significant Labs:   CBC:   Recent Labs   Lab 03/11/20  2220 03/12/20  1043 03/13/20  0552   WBC 16.72* 10.26 9.63   HGB 8.8* 8.1* 8.5*   HCT 28.4* 27.9* 28.9*   * 297 317     CMP:   Recent Labs   Lab 03/11/20  2220 03/12/20  1043 03/13/20  0552    140 141   K 4.9 5.3* 5.0    106 109   CO2 18* 24 20*   * 111* 118*   BUN 39* 43* 48*   CREATININE 2.8* 2.9* 2.7*   CALCIUM 10.0 9.8 9.6   PROT 8.5* 8.1  --    ALBUMIN 3.7 3.6  --    BILITOT 0.4 0.3  --    ALKPHOS 196* 191*  --    AST 19 20  --    ALT 13 14  --    ANIONGAP 16 10 12   EGFRNONAA 22* 21* 23*       Significant Imaging:     Imaging Results          X-Ray Chest AP Portable (Final result)  Result time 03/12/20 09:22:40    Final result by Saji Issa MD (03/12/20 09:22:40)                 Impression:      As above.      Electronically signed by: Saji Issa  Date:    03/12/2020  Time:    09:22             Narrative:    EXAMINATION:  XR CHEST AP PORTABLE    CLINICAL HISTORY:  . Presence of other specified devices    TECHNIQUE:  Single frontal portable view of the chest was performed.    COMPARISON:  02/02/2020    FINDINGS:  Support devices:    Esophagogastric tube in satisfactory position overlying the body of the stomach.    Chronic interstitial coarsening.  Linear reticular interstitial opacification reduced in severity compared to 02/02/2020.  No pneumothorax.  Suggestion of tiny right pleural effusion unchanged.  Surgical clips right axilla and left cervical region.    The cardiac silhouette is normal in size. The hilar and mediastinal contours are unremarkable.    Bones are intact.                               CT Abdomen Pelvis  Without Contrast (Final result)  Result time 03/12/20 08:01:28    Final result by Aurelio Curtis MD (03/12/20 08:01:28)                 Impression:      Prominent small bowel loops are seen throughout the abdomen  with transition suspected within the upper pelvis likely at the site of a bowel anastomosis with decompressed ileum distally.  Small bowel loops are thickened at the level near the obstruction without evidence of pneumatosis.  Findings concerning for bowel obstruction.    Small hiatal hernia containing contrast and mild thickening at the GE junction likely reflects reflux disease.  Thickening of the rugal folds throughout the stomach likely reflects gastritis. Recommend endoscopy follow-up.    All CT scans at this facility use dose modulation, iterative reconstruction and/or weight based dosing when appropriate to reduce radiation dose to as low as reasonably achievable.      Electronically signed by: Aurelio Curtis MD  Date:    03/12/2020  Time:    08:01             Narrative:    EXAMINATION:  CT ABDOMEN PELVIS WITHOUT CONTRAST    CLINICAL HISTORY:  Abdominal pain, unspecified;    TECHNIQUE:  Routine 5 mm non-contrast images of the abdomen and pelvis were done.  Sagittal and coronal reformats were also submitted for interpretation.    COMPARISON:  None    FINDINGS:  No consolidation.  Chronic interstitial thickening suspected at the right lung base.  Acute interstitial pneumonia not excluded.  No pleural effusion or pneumothorax identified heart size is normal.  Severe coronary disease.    The liver is normal in size and attenuation with no focal hepatic abnormality.  Moderate gallbladder distension.  No stones are identified.  No significant intrahepatic ductal dilatation.  Common bile duct is dilated measuring 11 mm without focal obstruction or stone.    The spleen, pancreas, and adrenal glands are unremarkable.  Benign splenic granuloma are noted.    Kidneys are small in size.  Nonobstructing stone lower pole of the right kidney.  Horseshoe configuration is noted.  Nonobstructing stone lower pole right kidney.  Mild left-sided hydronephrosis double-J ureteral stent is noted in satisfactory position.  Bladder  demonstrates mild nonspecific bladder wall thickening which can be seen with cystitis..    Small hiatal hernia containing contrast and mild thickening at the GE junction likely reflects reflux disease.  Thickening of the rugal folds throughout the stomach likely reflects gastritis.  Recommend endoscopy follow-up.  Duodenal diverticulum is seen along the 3rd portion the duodenum.  Prominent small bowel loops are seen throughout the abdomen with transition suspected within the upper pelvis likely at the site of a bowel anastomosis with decompressed ileum distally.  Findings concerning for bowel obstruction.  Large bowel demonstrates fatty infiltration of the wall of the colon likely reflecting chronic inflammatory process.  Mild constipation.  Appendix is not seen..  Moderate diverticulosis seen throughout the descending and sigmoid colon.  No definite evidence for acute diverticulitis.  Trace ascites.  No free air.    There is no evidence of lymph node enlargement in the abdomen or pelvis.  Shotty nodes are seen within the mesentery and retroperitoneum.    The abdominal aorta is small caliber with severe atherosclerotic calcifications.  Subclavian femoral bypass and fem-fem bypass grafts are noted.  Extensive clips are seen in the bilateral groin.    Moderate degenerative changes throughout the lumbar spine.  Chronic right rib fracture deformity involving the 7th rib laterally.  Chronic compression deformities at L3 and L1.                               X-Ray Abdomen Flat And Erect (Final result)  Result time 03/11/20 22:41:16    Final result by Aurelio Briceno MD (03/11/20 22:41:16)                 Impression:      Dilated loops of small bowel with multiple air-fluid levels consistent with a small-bowel obstruction.  No definite evidence of free air.      Electronically signed by: Aurelio Briceno MD  Date:    03/11/2020  Time:    22:41             Narrative:    EXAMINATION:  XR ABDOMEN FLAT AND ERECT    CLINICAL  HISTORY:  Generalized abdominal pain    TECHNIQUE:  Flat and erect AP views of the abdomen were performed.    COMPARISON:  03/27/2019    FINDINGS:  Left ureteral stent.  Multiple air-fluid levels with distended small bowel loops consistent with a small bowel obstruction.                                Assessment/Plan:      * SBO (small bowel obstruction)  NG tube to LWS   NPO   General surgery following   IVF   OOB ambulate         CKD (chronic kidney disease) stage 4, GFR 15-29 ml/min  Will avoid nephrotoxic meds , monitor serum creatinine     3/13  Creatinine 2.7  Renal function as baseline   Monitor       Anemia    Will monitor closely, transfuse as needed     Essential hypertension    He is NPO , will use hydralazine PRN       VTE Risk Mitigation (From admission, onward)         Ordered     IP VTE HIGH RISK PATIENT  Once      03/12/20 0025     Reason for no Mechanical VTE Prophylaxis  Once     Question:  Reasons:  Answer:  Physician Provided (leave comment)    03/12/20 0025     Place sequential compression device  Until discontinued      03/12/20 0025                      Emory Alejo NP  Department of Hospital Medicine   [facility]

## 2020-03-14 PROCEDURE — 99233 SBSQ HOSP IP/OBS HIGH 50: CPT | Mod: HCNC,,, | Performed by: INTERNAL MEDICINE

## 2020-03-14 PROCEDURE — 11000001 HC ACUTE MED/SURG PRIVATE ROOM: Mod: HCNC

## 2020-03-14 PROCEDURE — 99233 PR SUBSEQUENT HOSPITAL CARE,LEVL III: ICD-10-PCS | Mod: HCNC,,, | Performed by: INTERNAL MEDICINE

## 2020-03-14 PROCEDURE — S0028 INJECTION, FAMOTIDINE, 20 MG: HCPCS | Mod: HCNC | Performed by: FAMILY MEDICINE

## 2020-03-14 PROCEDURE — 63600175 PHARM REV CODE 636 W HCPCS: Mod: HCNC | Performed by: INTERNAL MEDICINE

## 2020-03-14 PROCEDURE — 25000003 PHARM REV CODE 250: Mod: HCNC | Performed by: FAMILY MEDICINE

## 2020-03-14 PROCEDURE — 63600175 PHARM REV CODE 636 W HCPCS: Mod: HCNC | Performed by: NURSE PRACTITIONER

## 2020-03-14 PROCEDURE — 99232 PR SUBSEQUENT HOSPITAL CARE,LEVL II: ICD-10-PCS | Mod: HCNC,,, | Performed by: SURGERY

## 2020-03-14 PROCEDURE — 63700000 PHARM REV CODE 250 ALT 637 W/O HCPCS: Mod: HCNC | Performed by: INTERNAL MEDICINE

## 2020-03-14 PROCEDURE — 99232 SBSQ HOSP IP/OBS MODERATE 35: CPT | Mod: HCNC,,, | Performed by: SURGERY

## 2020-03-14 RX ORDER — NITROGLYCERIN 0.4 MG/1
0.4 TABLET SUBLINGUAL EVERY 5 MIN PRN
Status: DISCONTINUED | OUTPATIENT
Start: 2020-03-14 | End: 2020-03-17 | Stop reason: HOSPADM

## 2020-03-14 RX ADMIN — HYDRALAZINE HYDROCHLORIDE 10 MG: 20 INJECTION INTRAMUSCULAR; INTRAVENOUS at 12:03

## 2020-03-14 RX ADMIN — NITROGLYCERIN 0.4 MG: 0.4 TABLET, ORALLY DISINTEGRATING SUBLINGUAL at 06:03

## 2020-03-14 RX ADMIN — SODIUM CHLORIDE: 0.9 INJECTION, SOLUTION INTRAVENOUS at 02:03

## 2020-03-14 RX ADMIN — CEFEPIME HYDROCHLORIDE 1 G: 1 INJECTION, SOLUTION INTRAVENOUS at 06:03

## 2020-03-14 RX ADMIN — HYDRALAZINE HYDROCHLORIDE 10 MG: 20 INJECTION INTRAMUSCULAR; INTRAVENOUS at 09:03

## 2020-03-14 RX ADMIN — FAMOTIDINE 20 MG: 20 INJECTION, SOLUTION INTRAVENOUS at 09:03

## 2020-03-14 RX ADMIN — MEPERIDINE HYDROCHLORIDE 12.5 MG: 25 INJECTION INTRAMUSCULAR; INTRAVENOUS; SUBCUTANEOUS at 12:03

## 2020-03-14 RX ADMIN — HYDRALAZINE HYDROCHLORIDE 10 MG: 20 INJECTION INTRAMUSCULAR; INTRAVENOUS at 07:03

## 2020-03-14 NOTE — SUBJECTIVE & OBJECTIVE
Interval History:  Patient has had multiple bowel movements.  His abdominal distention has resolved.  He has minimal abdominal pain.  Check abdominal films    Medications:  Continuous Infusions:   sodium chloride 0.9% 100 mL/hr at 03/13/20 0649     Scheduled Meds:   ceFEPime (MAXIPIME) IVPB  1 g Intravenous Q24H    famotidine  20 mg Intravenous Daily     PRN Meds:albuterol-ipratropium, Dextrose 10% Bolus, Dextrose 10% Bolus, glucagon (human recombinant), glucose, glucose, hydrALAZINE, meperidine, nitroGLYCERIN, ondansetron, promethazine (PHENERGAN) IVPB, sodium chloride 0.9%     Review of patient's allergies indicates:   Allergen Reactions    Morphine Itching     Objective:     Vital Signs (Most Recent):  Temp: 98 °F (36.7 °C) (03/14/20 0736)  Pulse: 81 (03/14/20 0736)  Resp: 18 (03/14/20 0736)  BP: (!) 173/79 (03/14/20 0736)  SpO2: 98 % (03/14/20 0736) Vital Signs (24h Range):  Temp:  [97.3 °F (36.3 °C)-98 °F (36.7 °C)] 98 °F (36.7 °C)  Pulse:  [80-98] 81  Resp:  [18-19] 18  SpO2:  [92 %-99 %] 98 %  BP: (152-182)/(69-84) 173/79     Weight: 67.6 kg (149 lb 0.5 oz)  Body mass index is 19.66 kg/m².    Intake/Output - Last 3 Shifts       03/12 0700 - 03/13 0659 03/13 0700 - 03/14 0659 03/14 0700 - 03/15 0659    P.O.  200     I.V. (mL/kg) 2993.3 (40.8) 1000 (14.8)     IV Piggyback 100 150     Total Intake(mL/kg) 3093.3 (42.2) 1350 (20)     Urine (mL/kg/hr) 400 (0.2) 440 (0.3)     Drains 850 80     Total Output 1250 520     Net +1843.3 +830            Urine Occurrence 2 x            Physical Exam   Constitutional: He is oriented to person, place, and time. He appears well-developed and well-nourished. No distress.   HENT:   Poor dentition   Neck: Normal range of motion.   Cardiovascular: Normal rate, regular rhythm and normal heart sounds.   Pulmonary/Chest: Effort normal and breath sounds normal.   Abdominal: Soft. Bowel sounds are normal. He exhibits no distension. Tenderness: Very minimal.   Well-healed long  midline incision   Musculoskeletal: Normal range of motion. He exhibits no edema.   Neurological: He is oriented to person, place, and time.   Skin: Skin is warm. Capillary refill takes less than 2 seconds.   Psychiatric: He has a normal mood and affect. His behavior is normal. Judgment and thought content normal.   Nursing note and vitals reviewed.      Significant Labs:  CBC:   Recent Labs   Lab 03/13/20  0552   WBC 9.63   RBC 3.07*   HGB 8.5*   HCT 28.9*      MCV 94   MCH 27.7   MCHC 29.4*     BMP:   Recent Labs   Lab 03/13/20  0552   *      K 5.0      CO2 20*   BUN 48*   CREATININE 2.7*   CALCIUM 9.6   MG 2.5     CMP:   Recent Labs   Lab 03/12/20  1043 03/13/20  0552   * 118*   CALCIUM 9.8 9.6   ALBUMIN 3.6  --    PROT 8.1  --     141   K 5.3* 5.0   CO2 24 20*    109   BUN 43* 48*   CREATININE 2.9* 2.7*   ALKPHOS 191*  --    ALT 14  --    AST 20  --    BILITOT 0.3  --        Significant Diagnostics:  Abdominal x-rays ordered

## 2020-03-14 NOTE — PROGRESS NOTES
[facility] -   General Surgery  Progress Note    Subjective:     History of Present Illness:  70-year-old male with a past medical history significant for anemia, GERD, HTN, HLD, CKD, CAD and previous AAA repair who presented to the [facility] emergency department with abdominal pain, nausea and vomiting x1 day.  Patient states the pain and nausea began while simultaneously, with pain being sharp and moderate in severity.  Patient states that his last bowel movement was 2 days ago.  Workup in emergency department was worrisome for a small-bowel obstruction. Patient was admitted to the medical service and surgery is consulted for evaluation.  Patient denies any current fever, chills, hematochezia or melena.  Of note, patient had a similar episode in 2017 that resolved with non operative management. Multiple previous abdominal operations, including AAA repair, AAA graft excision.     Post-Op Info:  * No surgery found *         Interval History:  Patient has had multiple bowel movements.  His abdominal distention has resolved.  He has minimal abdominal pain.  Check abdominal films    Medications:  Continuous Infusions:   sodium chloride 0.9% 100 mL/hr at 03/13/20 0649     Scheduled Meds:   ceFEPime (MAXIPIME) IVPB  1 g Intravenous Q24H    famotidine  20 mg Intravenous Daily     PRN Meds:albuterol-ipratropium, Dextrose 10% Bolus, Dextrose 10% Bolus, glucagon (human recombinant), glucose, glucose, hydrALAZINE, meperidine, nitroGLYCERIN, ondansetron, promethazine (PHENERGAN) IVPB, sodium chloride 0.9%     Review of patient's allergies indicates:   Allergen Reactions    Morphine Itching     Objective:     Vital Signs (Most Recent):  Temp: 98 °F (36.7 °C) (03/14/20 0736)  Pulse: 81 (03/14/20 0736)  Resp: 18 (03/14/20 0736)  BP: (!) 173/79 (03/14/20 0736)  SpO2: 98 % (03/14/20 0736) Vital Signs (24h Range):  Temp:  [97.3 °F (36.3 °C)-98 °F (36.7 °C)] 98 °F (36.7 °C)  Pulse:  [80-98] 81  Resp:  [18-19]  18  SpO2:  [92 %-99 %] 98 %  BP: (152-182)/(69-84) 173/79     Weight: 67.6 kg (149 lb 0.5 oz)  Body mass index is 19.66 kg/m².    Intake/Output - Last 3 Shifts       03/12 0700 - 03/13 0659 03/13 0700 - 03/14 0659 03/14 0700 - 03/15 0659    P.O.  200     I.V. (mL/kg) 2993.3 (40.8) 1000 (14.8)     IV Piggyback 100 150     Total Intake(mL/kg) 3093.3 (42.2) 1350 (20)     Urine (mL/kg/hr) 400 (0.2) 440 (0.3)     Drains 850 80     Total Output 1250 520     Net +1843.3 +830            Urine Occurrence 2 x            Physical Exam   Constitutional: He is oriented to person, place, and time. He appears well-developed and well-nourished. No distress.   HENT:   Poor dentition   Neck: Normal range of motion.   Cardiovascular: Normal rate, regular rhythm and normal heart sounds.   Pulmonary/Chest: Effort normal and breath sounds normal.   Abdominal: Soft. Bowel sounds are normal. He exhibits no distension. Tenderness: Very minimal.   Well-healed long midline incision   Musculoskeletal: Normal range of motion. He exhibits no edema.   Neurological: He is oriented to person, place, and time.   Skin: Skin is warm. Capillary refill takes less than 2 seconds.   Psychiatric: He has a normal mood and affect. His behavior is normal. Judgment and thought content normal.   Nursing note and vitals reviewed.      Significant Labs:  CBC:   Recent Labs   Lab 03/13/20  0552   WBC 9.63   RBC 3.07*   HGB 8.5*   HCT 28.9*      MCV 94   MCH 27.7   MCHC 29.4*     BMP:   Recent Labs   Lab 03/13/20  0552   *      K 5.0      CO2 20*   BUN 48*   CREATININE 2.7*   CALCIUM 9.6   MG 2.5     CMP:   Recent Labs   Lab 03/12/20  1043 03/13/20  0552   * 118*   CALCIUM 9.8 9.6   ALBUMIN 3.6  --    PROT 8.1  --     141   K 5.3* 5.0   CO2 24 20*    109   BUN 43* 48*   CREATININE 2.9* 2.7*   ALKPHOS 191*  --    ALT 14  --    AST 20  --    BILITOT 0.3  --        Significant Diagnostics:  Abdominal x-rays  ordered    Assessment/Plan:     * SBO (small bowel obstruction)  69yo M with multiple previous abdominal operations with SBO    Patient has clinically improved.  NG tube was removed.  Abdominal films ordered.    If the abdominal film showing normal small bowel gas pattern then clear liquids to full liquids.    CKD (chronic kidney disease) stage 4, GFR 15-29 ml/min  Management per primary team    Anemia  Management per primary team    Essential hypertension  Management per primary team        Matt Mcgrath MD  General Surgery  [facility]

## 2020-03-14 NOTE — PLAN OF CARE
Patient aao x 4, NG tube present. Elevated bp during shift, prn hydralazine administered as ordered. Prn pain medication administered upon patient request as ordered. Remain afebrile. Remain free from falls. Safety measures continued.  Iv fluids. NPO diet except for ice chips. New IV, 20 G to left upper arm. Plan of care reviewed. Will continue to monitor.

## 2020-03-14 NOTE — SUBJECTIVE & OBJECTIVE
Interval History:     3/13/20: patient reports mild abdominal pain. No BM yet. Renal function stable with creatinine at 2.7.     3/14/20: patient is feeling great today and reports having had BM last night. NG tube has been removed.         Review of patient's allergies indicates:   Allergen Reactions    Morphine Itching     Current Facility-Administered Medications   Medication Frequency    0.9%  NaCl infusion Continuous    albuterol-ipratropium 2.5 mg-0.5 mg/3 mL nebulizer solution 3 mL Q8H PRN    cefepime in dextrose 5 % 1 gram/50 mL IVPB 1 g Q24H    dextrose 10% (D10W) Bolus PRN    dextrose 10% (D10W) Bolus PRN    famotidine IVPB 20 mg Daily    glucagon (human recombinant) injection 1 mg PRN    glucose chewable tablet 16 g PRN    glucose chewable tablet 24 g PRN    hydrALAZINE injection 10 mg Q6H PRN    meperidine (PF) injection 12.5 mg Q4H PRN    nitroGLYCERIN SL tablet 0.4 mg Q5 Min PRN    ondansetron injection 4 mg Q6H PRN    promethazine (PHENERGAN) 6.25 mg in dextrose 5 % 50 mL IVPB Q6H PRN    sodium chloride 0.9% flush 10 mL PRN       Objective:     Vital Signs (Most Recent):  Temp: 98 °F (36.7 °C) (03/14/20 0736)  Pulse: 81 (03/14/20 0736)  Resp: 18 (03/14/20 0736)  BP: (!) 173/79 (03/14/20 0736)  SpO2: 98 % (03/14/20 0736)  O2 Device (Oxygen Therapy): room air (03/13/20 0729) Vital Signs (24h Range):  Temp:  [97.3 °F (36.3 °C)-98 °F (36.7 °C)] 98 °F (36.7 °C)  Pulse:  [80-98] 81  Resp:  [18-19] 18  SpO2:  [92 %-99 %] 98 %  BP: (152-182)/(69-84) 173/79     Weight: 67.6 kg (149 lb 0.5 oz) (03/13/20 6979)  Body mass index is 19.66 kg/m².  Body surface area is 1.87 meters squared.    I/O last 3 completed shifts:  In: 4443.3 [P.O.:200; I.V.:3993.3; IV Piggyback:250]  Out: 970 [Urine:840; Drains:130]    Physical Exam   Constitutional: He is oriented to person, place, and time. He appears well-developed. He is cooperative.   HENT:   Head: Normocephalic.   Nose: No rhinorrhea.   Mouth/Throat:  Mucous membranes are normal. No oropharyngeal exudate.   Eyes: Pupils are equal, round, and reactive to light.   Neck: No thyroid mass present.   Cardiovascular: Normal rate, regular rhythm, S1 normal, S2 normal and intact distal pulses.   Pulmonary/Chest: Effort normal. No respiratory distress. He has no wheezes.   Abdominal: Soft. Bowel sounds are normal. He exhibits no distension. No hernia.   Musculoskeletal:   Right BKA. Left foot amputation.    Lymphadenopathy:     He has no cervical adenopathy.   Neurological: He is alert and oriented to person, place, and time.   Skin: Skin is warm and dry.   Psychiatric: He has a normal mood and affect.       Significant Labs:  Lab Results   Component Value Date    CREATININE 2.7 (H) 03/13/2020    BUN 48 (H) 03/13/2020     03/13/2020    K 5.0 03/13/2020     03/13/2020    CO2 20 (L) 03/13/2020     Lab Results   Component Value Date    .0 (H) 02/26/2020    CALCIUM 9.6 03/13/2020    PHOS 5.4 (H) 03/13/2020       Lab Results   Component Value Date    ALBUMIN 3.6 03/12/2020     Lab Results   Component Value Date    WBC 9.63 03/13/2020    HGB 8.5 (L) 03/13/2020    HCT 28.9 (L) 03/13/2020    MCV 94 03/13/2020     03/13/2020       Recent Labs   Lab 03/13/20  0552   MG 2.5         Significant Imaging:  Imaging Results          X-Ray Chest AP Portable (Final result)  Result time 03/12/20 09:22:40    Final result by Saji Issa MD (03/12/20 09:22:40)                 Impression:      As above.      Electronically signed by: Saji Issa  Date:    03/12/2020  Time:    09:22             Narrative:    EXAMINATION:  XR CHEST AP PORTABLE    CLINICAL HISTORY:  . Presence of other specified devices    TECHNIQUE:  Single frontal portable view of the chest was performed.    COMPARISON:  02/02/2020    FINDINGS:  Support devices:    Esophagogastric tube in satisfactory position overlying the body of the stomach.    Chronic interstitial coarsening.  Linear reticular  interstitial opacification reduced in severity compared to 02/02/2020.  No pneumothorax.  Suggestion of tiny right pleural effusion unchanged.  Surgical clips right axilla and left cervical region.    The cardiac silhouette is normal in size. The hilar and mediastinal contours are unremarkable.    Bones are intact.                               CT Abdomen Pelvis  Without Contrast (Final result)  Result time 03/12/20 08:01:28    Final result by Aurelio Curtis MD (03/12/20 08:01:28)                 Impression:      Prominent small bowel loops are seen throughout the abdomen with transition suspected within the upper pelvis likely at the site of a bowel anastomosis with decompressed ileum distally.  Small bowel loops are thickened at the level near the obstruction without evidence of pneumatosis.  Findings concerning for bowel obstruction.    Small hiatal hernia containing contrast and mild thickening at the GE junction likely reflects reflux disease.  Thickening of the rugal folds throughout the stomach likely reflects gastritis. Recommend endoscopy follow-up.    All CT scans at this facility use dose modulation, iterative reconstruction and/or weight based dosing when appropriate to reduce radiation dose to as low as reasonably achievable.      Electronically signed by: Aurelio Curtis MD  Date:    03/12/2020  Time:    08:01             Narrative:    EXAMINATION:  CT ABDOMEN PELVIS WITHOUT CONTRAST    CLINICAL HISTORY:  Abdominal pain, unspecified;    TECHNIQUE:  Routine 5 mm non-contrast images of the abdomen and pelvis were done.  Sagittal and coronal reformats were also submitted for interpretation.    COMPARISON:  None    FINDINGS:  No consolidation.  Chronic interstitial thickening suspected at the right lung base.  Acute interstitial pneumonia not excluded.  No pleural effusion or pneumothorax identified heart size is normal.  Severe coronary disease.    The liver is normal in size and attenuation with no focal  hepatic abnormality.  Moderate gallbladder distension.  No stones are identified.  No significant intrahepatic ductal dilatation.  Common bile duct is dilated measuring 11 mm without focal obstruction or stone.    The spleen, pancreas, and adrenal glands are unremarkable.  Benign splenic granuloma are noted.    Kidneys are small in size.  Nonobstructing stone lower pole of the right kidney.  Horseshoe configuration is noted.  Nonobstructing stone lower pole right kidney.  Mild left-sided hydronephrosis double-J ureteral stent is noted in satisfactory position.  Bladder demonstrates mild nonspecific bladder wall thickening which can be seen with cystitis..    Small hiatal hernia containing contrast and mild thickening at the GE junction likely reflects reflux disease.  Thickening of the rugal folds throughout the stomach likely reflects gastritis.  Recommend endoscopy follow-up.  Duodenal diverticulum is seen along the 3rd portion the duodenum.  Prominent small bowel loops are seen throughout the abdomen with transition suspected within the upper pelvis likely at the site of a bowel anastomosis with decompressed ileum distally.  Findings concerning for bowel obstruction.  Large bowel demonstrates fatty infiltration of the wall of the colon likely reflecting chronic inflammatory process.  Mild constipation.  Appendix is not seen..  Moderate diverticulosis seen throughout the descending and sigmoid colon.  No definite evidence for acute diverticulitis.  Trace ascites.  No free air.    There is no evidence of lymph node enlargement in the abdomen or pelvis.  Shotty nodes are seen within the mesentery and retroperitoneum.    The abdominal aorta is small caliber with severe atherosclerotic calcifications.  Subclavian femoral bypass and fem-fem bypass grafts are noted.  Extensive clips are seen in the bilateral groin.    Moderate degenerative changes throughout the lumbar spine.  Chronic right rib fracture deformity  involving the 7th rib laterally.  Chronic compression deformities at L3 and L1.                               X-Ray Abdomen Flat And Erect (Final result)  Result time 03/11/20 22:41:16    Final result by Aurelio Briceno MD (03/11/20 22:41:16)                 Impression:      Dilated loops of small bowel with multiple air-fluid levels consistent with a small-bowel obstruction.  No definite evidence of free air.      Electronically signed by: Aurelio Briceno MD  Date:    03/11/2020  Time:    22:41             Narrative:    EXAMINATION:  XR ABDOMEN FLAT AND ERECT    CLINICAL HISTORY:  Generalized abdominal pain    TECHNIQUE:  Flat and erect AP views of the abdomen were performed.    COMPARISON:  03/27/2019    FINDINGS:  Left ureteral stent.  Multiple air-fluid levels with distended small bowel loops consistent with a small bowel obstruction.                               RADIOLOGY REPORT (Final result)  Result time 03/13/20 17:14:06    Final result by Unknown User (03/13/20 17:14:06)

## 2020-03-14 NOTE — NURSING
Patient complain of heaviness to chest, rating 4, requested nitro, notified provider. Received new order for nitro sl. Will continue to monitor.

## 2020-03-14 NOTE — PROGRESS NOTES
[facility] Hospital Medicine  Progress Note    Patient Name: Kodi Ribeiro  MRN: [id number]  Patient Class: IP- Inpatient   Admission Date: 3/11/2020  Length of Stay: 2 days  Attending Physician: Markell Magaña MD  Primary Care Provider: Norma Nuñez MD        Subjective:     Principal Problem:SBO (small bowel obstruction)        HPI:    70 y.o. male patient with a PMHx of anemia, GERD, HTN, HLD, MI  ,previous recurrent abdominal surgery for AAA repair who presented with abdominal pain/vomiting that worsened on the day of admit . Pain was described as sharp, 6/10 in intensity .  Since admission, CT scan of the abdomen showed     Per [facility] radiology, pt's CT results:   1. Reticulonodular densities in the R lower lung may represent an infectious/inflammatory process.   2. Dilated fluid and gas-filled small bowel loops with transition point in LLQ, compatible with SBO.  3. Trace ascites.   4. Mild L hydroureteronephrosis with L ureteral stent noted. No definite obstructing stone identified.     Gen surgery was notified in the ED. He had a previous episode of SBO in 2017 which resolved without surgery .His wife is by bedside. He has NG tube in situ.     Overview/Hospital Course:  Mr Ribeiro is a 70 year old male admitted with small bowl obstruction, general surgery following. He continues to be NPO with NGT intact to low suction. Vital signs and labs stable. 3/14/20 The patient reports improvement in symptoms overnight, no further abd distension noted. The patient reports having a large BM overnight. + bowel sounds. NG tube D/Cd and diet advanced to clear liquid.     Interval History:  The patient reports improvement in symptoms overnight, no further abd distension noted. The patient reports having a large BM overnight. + bowel sounds. NG tube D/Cd and diet advanced to clear liquid.    Review of Systems   Constitutional: Positive for appetite change (decreased). Negative for activity change, chills,  diaphoresis, fatigue, fever and unexpected weight change.   HENT: Negative for congestion, ear discharge, facial swelling, rhinorrhea, sinus pressure, sneezing, sore throat and trouble swallowing.    Eyes: Negative for discharge, redness and visual disturbance.   Respiratory: Negative for apnea, cough, choking, chest tightness, shortness of breath, wheezing and stridor.    Cardiovascular: Negative for chest pain, palpitations and leg swelling.   Gastrointestinal: Positive for abdominal pain and nausea. Negative for abdominal distention, anal bleeding, blood in stool, constipation, diarrhea and vomiting.   Endocrine: Negative for cold intolerance and heat intolerance.   Genitourinary: Negative for difficulty urinating, discharge, dysuria, frequency and hematuria.   Musculoskeletal: Negative for arthralgias, back pain, gait problem, joint swelling, myalgias, neck pain and neck stiffness.   Skin: Negative for color change, pallor and rash.   Neurological: Negative for dizziness, tremors, seizures, syncope, facial asymmetry, speech difficulty, weakness, light-headedness, numbness and headaches.   Psychiatric/Behavioral: Negative for agitation, behavioral problems, confusion, hallucinations, sleep disturbance and suicidal ideas. The patient is not nervous/anxious.    All other systems reviewed and are negative.    Objective:     Vital Signs (Most Recent):  Temp: 98 °F (36.7 °C) (03/14/20 0736)  Pulse: 84 (03/14/20 0902)  Resp: 18 (03/14/20 0736)  BP: (!) 173/79 (03/14/20 0736)  SpO2: 98 % (03/14/20 0736) Vital Signs (24h Range):  Temp:  [97.3 °F (36.3 °C)-98 °F (36.7 °C)] 98 °F (36.7 °C)  Pulse:  [80-89] 84  Resp:  [18-19] 18  SpO2:  [92 %-99 %] 98 %  BP: (152-182)/(69-84) 173/79     Weight: 67.6 kg (149 lb 0.5 oz)  Body mass index is 19.66 kg/m².    Intake/Output Summary (Last 24 hours) at 3/14/2020 1206  Last data filed at 3/13/2020 1805  Gross per 24 hour   Intake 1350 ml   Output 520 ml   Net 830 ml      Physical  Exam   Constitutional: He is oriented to person, place, and time. He appears well-developed and well-nourished. No distress.   HENT:   Head: Normocephalic and atraumatic.   Mouth/Throat: No oropharyngeal exudate.   Eyes: Pupils are equal, round, and reactive to light. Conjunctivae are normal. Right eye exhibits no discharge. Left eye exhibits no discharge.   Neck: Normal range of motion. Neck supple. No JVD present.   Cardiovascular: Normal rate, regular rhythm, normal heart sounds and intact distal pulses. Exam reveals no gallop and no friction rub.   No murmur heard.  Pulmonary/Chest: Effort normal and breath sounds normal. No stridor. No respiratory distress. He has no wheezes. He has no rales. He exhibits no tenderness.   Abdominal: Soft. Bowel sounds are normal. He exhibits no distension and no mass. There is no tenderness. There is no rebound and no guarding. No hernia.   Musculoskeletal: He exhibits no edema, tenderness or deformity.   Neurological: He is alert and oriented to person, place, and time.   Skin: Skin is warm and dry. Capillary refill takes less than 2 seconds. No rash noted. He is not diaphoretic. No erythema.   Psychiatric: He has a normal mood and affect. His behavior is normal.   Nursing note and vitals reviewed.      Significant Labs: All pertinent labs within the past 24 hours have been reviewed.    Significant Imaging:   Imaging Results          X-Ray Chest AP Portable (Final result)  Result time 03/12/20 09:22:40    Final result by Saji Issa MD (03/12/20 09:22:40)                 Impression:      As above.      Electronically signed by: Saji Issa  Date:    03/12/2020  Time:    09:22             Narrative:    EXAMINATION:  XR CHEST AP PORTABLE    CLINICAL HISTORY:  . Presence of other specified devices    TECHNIQUE:  Single frontal portable view of the chest was performed.    COMPARISON:  02/02/2020    FINDINGS:  Support devices:    Esophagogastric tube in satisfactory position  overlying the body of the stomach.    Chronic interstitial coarsening.  Linear reticular interstitial opacification reduced in severity compared to 02/02/2020.  No pneumothorax.  Suggestion of tiny right pleural effusion unchanged.  Surgical clips right axilla and left cervical region.    The cardiac silhouette is normal in size. The hilar and mediastinal contours are unremarkable.    Bones are intact.                               CT Abdomen Pelvis  Without Contrast (Final result)  Result time 03/12/20 08:01:28    Final result by Aurelio Curtis MD (03/12/20 08:01:28)                 Impression:      Prominent small bowel loops are seen throughout the abdomen with transition suspected within the upper pelvis likely at the site of a bowel anastomosis with decompressed ileum distally.  Small bowel loops are thickened at the level near the obstruction without evidence of pneumatosis.  Findings concerning for bowel obstruction.    Small hiatal hernia containing contrast and mild thickening at the GE junction likely reflects reflux disease.  Thickening of the rugal folds throughout the stomach likely reflects gastritis. Recommend endoscopy follow-up.    All CT scans at this facility use dose modulation, iterative reconstruction and/or weight based dosing when appropriate to reduce radiation dose to as low as reasonably achievable.      Electronically signed by: Aurelio Curtis MD  Date:    03/12/2020  Time:    08:01             Narrative:    EXAMINATION:  CT ABDOMEN PELVIS WITHOUT CONTRAST    CLINICAL HISTORY:  Abdominal pain, unspecified;    TECHNIQUE:  Routine 5 mm non-contrast images of the abdomen and pelvis were done.  Sagittal and coronal reformats were also submitted for interpretation.    COMPARISON:  None    FINDINGS:  No consolidation.  Chronic interstitial thickening suspected at the right lung base.  Acute interstitial pneumonia not excluded.  No pleural effusion or pneumothorax identified heart size is  normal.  Severe coronary disease.    The liver is normal in size and attenuation with no focal hepatic abnormality.  Moderate gallbladder distension.  No stones are identified.  No significant intrahepatic ductal dilatation.  Common bile duct is dilated measuring 11 mm without focal obstruction or stone.    The spleen, pancreas, and adrenal glands are unremarkable.  Benign splenic granuloma are noted.    Kidneys are small in size.  Nonobstructing stone lower pole of the right kidney.  Horseshoe configuration is noted.  Nonobstructing stone lower pole right kidney.  Mild left-sided hydronephrosis double-J ureteral stent is noted in satisfactory position.  Bladder demonstrates mild nonspecific bladder wall thickening which can be seen with cystitis..    Small hiatal hernia containing contrast and mild thickening at the GE junction likely reflects reflux disease.  Thickening of the rugal folds throughout the stomach likely reflects gastritis.  Recommend endoscopy follow-up.  Duodenal diverticulum is seen along the 3rd portion the duodenum.  Prominent small bowel loops are seen throughout the abdomen with transition suspected within the upper pelvis likely at the site of a bowel anastomosis with decompressed ileum distally.  Findings concerning for bowel obstruction.  Large bowel demonstrates fatty infiltration of the wall of the colon likely reflecting chronic inflammatory process.  Mild constipation.  Appendix is not seen..  Moderate diverticulosis seen throughout the descending and sigmoid colon.  No definite evidence for acute diverticulitis.  Trace ascites.  No free air.    There is no evidence of lymph node enlargement in the abdomen or pelvis.  Shotty nodes are seen within the mesentery and retroperitoneum.    The abdominal aorta is small caliber with severe atherosclerotic calcifications.  Subclavian femoral bypass and fem-fem bypass grafts are noted.  Extensive clips are seen in the bilateral groin.    Moderate  degenerative changes throughout the lumbar spine.  Chronic right rib fracture deformity involving the 7th rib laterally.  Chronic compression deformities at L3 and L1.                               X-Ray Abdomen Flat And Erect (Final result)  Result time 03/11/20 22:41:16    Final result by Aurelio Briceno MD (03/11/20 22:41:16)                 Impression:      Dilated loops of small bowel with multiple air-fluid levels consistent with a small-bowel obstruction.  No definite evidence of free air.      Electronically signed by: Aurelio Briceno MD  Date:    03/11/2020  Time:    22:41             Narrative:    EXAMINATION:  XR ABDOMEN FLAT AND ERECT    CLINICAL HISTORY:  Generalized abdominal pain    TECHNIQUE:  Flat and erect AP views of the abdomen were performed.    COMPARISON:  03/27/2019    FINDINGS:  Left ureteral stent.  Multiple air-fluid levels with distended small bowel loops consistent with a small bowel obstruction.                               RADIOLOGY REPORT (Final result)  Result time 03/13/20 17:14:06    Final result by Unknown User (03/13/20 17:14:06)                                      Assessment/Plan:      * SBO (small bowel obstruction)  NG tube to LWS   NPO   General surgery following   IVF   OOB ambulate   3/14/20  The patient reports improvement in symptoms overnight, no further abd distension noted. The patient reports having a large BM overnight. + bowel sounds. NG tube D/Cd and diet advanced to clear liquid.         CKD (chronic kidney disease) stage 4, GFR 15-29 ml/min  Will avoid nephrotoxic meds , monitor serum creatinine     3/13  Creatinine 2.7  Renal function as baseline   Monitor       Anemia  Will monitor closely, transfuse as needed     Essential hypertension  He is NPO , will use hydralazine PRN       VTE Risk Mitigation (From admission, onward)         Ordered     IP VTE HIGH RISK PATIENT  Once      03/12/20 0025     Reason for no Mechanical VTE Prophylaxis  Once     Question:   Reasons:  Answer:  Physician Provided (leave comment)    03/12/20 0025     Place sequential compression device  Until discontinued      03/12/20 0025                      Brennen Weiner NP  Department of Hospital Medicine   [facility]

## 2020-03-14 NOTE — ASSESSMENT & PLAN NOTE
NG tube to LWS   NPO   General surgery following   IVF   OOB ambulate   3/14/20  The patient reports improvement in symptoms overnight, no further abd distension noted. The patient reports having a large BM overnight. + bowel sounds. NG tube D/Cd and diet advanced to clear liquid.

## 2020-03-14 NOTE — NURSING
Patient verbalized relief after 2 doses of nitro sl. Notified provider. Will continue to monitor.

## 2020-03-14 NOTE — HOSPITAL COURSE
03/14/2020.  Abdominal distension has resolved.  Multiple bowel movements.  Minimal discomfort..  Would like is NG tube removed    03/15/2020.  Abdominal distension has resolved.  No abdominal pain.  No more bowel movements.  Tolerated full

## 2020-03-14 NOTE — ASSESSMENT & PLAN NOTE
1. CKD stage 4 :  Patient baseline serum creatinine fluctuates between 2 and 3 mg/dL, CT scan of the abdomen shows mild hydronephrosis on the left side, patient has history of ureteric stricture and stent placement on that side, also has nonobstructing stones bilaterally, monitor renal function closely, avoid ACE inhibitors/ARB/NSAIDs at this time, continue gentle hydration as he is on NPO status, creatinine was 2.7 yesterday. Will follow up on today's labs.     2.  Hypertension - has been fluctuating. Make adjustments as needed.    3.  Small bowel obstruction - management per General surgery    4.  Anemia - multifactorial, monitor hemoglobin, PRBC transfusion when indicated.     5.  Electrolytes: Borderline hyperphosphatemia. Monitor.

## 2020-03-14 NOTE — SUBJECTIVE & OBJECTIVE
Interval History:  The patient reports improvement in symptoms overnight, no further abd distension noted. The patient reports having a large BM overnight. + bowel sounds. NG tube D/Cd and diet advanced to clear liquid.    Review of Systems   Constitutional: Positive for appetite change (decreased). Negative for activity change, chills, diaphoresis, fatigue, fever and unexpected weight change.   HENT: Negative for congestion, ear discharge, facial swelling, rhinorrhea, sinus pressure, sneezing, sore throat and trouble swallowing.    Eyes: Negative for discharge, redness and visual disturbance.   Respiratory: Negative for apnea, cough, choking, chest tightness, shortness of breath, wheezing and stridor.    Cardiovascular: Negative for chest pain, palpitations and leg swelling.   Gastrointestinal: Positive for abdominal pain and nausea. Negative for abdominal distention, anal bleeding, blood in stool, constipation, diarrhea and vomiting.   Endocrine: Negative for cold intolerance and heat intolerance.   Genitourinary: Negative for difficulty urinating, discharge, dysuria, frequency and hematuria.   Musculoskeletal: Negative for arthralgias, back pain, gait problem, joint swelling, myalgias, neck pain and neck stiffness.   Skin: Negative for color change, pallor and rash.   Neurological: Negative for dizziness, tremors, seizures, syncope, facial asymmetry, speech difficulty, weakness, light-headedness, numbness and headaches.   Psychiatric/Behavioral: Negative for agitation, behavioral problems, confusion, hallucinations, sleep disturbance and suicidal ideas. The patient is not nervous/anxious.    All other systems reviewed and are negative.    Objective:     Vital Signs (Most Recent):  Temp: 98 °F (36.7 °C) (03/14/20 0736)  Pulse: 84 (03/14/20 0902)  Resp: 18 (03/14/20 0736)  BP: (!) 173/79 (03/14/20 0736)  SpO2: 98 % (03/14/20 0736) Vital Signs (24h Range):  Temp:  [97.3 °F (36.3 °C)-98 °F (36.7 °C)] 98 °F (36.7  °C)  Pulse:  [80-89] 84  Resp:  [18-19] 18  SpO2:  [92 %-99 %] 98 %  BP: (152-182)/(69-84) 173/79     Weight: 67.6 kg (149 lb 0.5 oz)  Body mass index is 19.66 kg/m².    Intake/Output Summary (Last 24 hours) at 3/14/2020 1206  Last data filed at 3/13/2020 1805  Gross per 24 hour   Intake 1350 ml   Output 520 ml   Net 830 ml      Physical Exam   Constitutional: He is oriented to person, place, and time. He appears well-developed and well-nourished. No distress.   HENT:   Head: Normocephalic and atraumatic.   Mouth/Throat: No oropharyngeal exudate.   Eyes: Pupils are equal, round, and reactive to light. Conjunctivae are normal. Right eye exhibits no discharge. Left eye exhibits no discharge.   Neck: Normal range of motion. Neck supple. No JVD present.   Cardiovascular: Normal rate, regular rhythm, normal heart sounds and intact distal pulses. Exam reveals no gallop and no friction rub.   No murmur heard.  Pulmonary/Chest: Effort normal and breath sounds normal. No stridor. No respiratory distress. He has no wheezes. He has no rales. He exhibits no tenderness.   Abdominal: Soft. Bowel sounds are normal. He exhibits no distension and no mass. There is no tenderness. There is no rebound and no guarding. No hernia.   Musculoskeletal: He exhibits no edema, tenderness or deformity.   Neurological: He is alert and oriented to person, place, and time.   Skin: Skin is warm and dry. Capillary refill takes less than 2 seconds. No rash noted. He is not diaphoretic. No erythema.   Psychiatric: He has a normal mood and affect. His behavior is normal.   Nursing note and vitals reviewed.      Significant Labs: All pertinent labs within the past 24 hours have been reviewed.    Significant Imaging:   Imaging Results          X-Ray Chest AP Portable (Final result)  Result time 03/12/20 09:22:40    Final result by Saji Issa MD (03/12/20 09:22:40)                 Impression:      As above.      Electronically signed by: Saji  Luis Manuel  Date:    03/12/2020  Time:    09:22             Narrative:    EXAMINATION:  XR CHEST AP PORTABLE    CLINICAL HISTORY:  . Presence of other specified devices    TECHNIQUE:  Single frontal portable view of the chest was performed.    COMPARISON:  02/02/2020    FINDINGS:  Support devices:    Esophagogastric tube in satisfactory position overlying the body of the stomach.    Chronic interstitial coarsening.  Linear reticular interstitial opacification reduced in severity compared to 02/02/2020.  No pneumothorax.  Suggestion of tiny right pleural effusion unchanged.  Surgical clips right axilla and left cervical region.    The cardiac silhouette is normal in size. The hilar and mediastinal contours are unremarkable.    Bones are intact.                               CT Abdomen Pelvis  Without Contrast (Final result)  Result time 03/12/20 08:01:28    Final result by Aurelio Curtis MD (03/12/20 08:01:28)                 Impression:      Prominent small bowel loops are seen throughout the abdomen with transition suspected within the upper pelvis likely at the site of a bowel anastomosis with decompressed ileum distally.  Small bowel loops are thickened at the level near the obstruction without evidence of pneumatosis.  Findings concerning for bowel obstruction.    Small hiatal hernia containing contrast and mild thickening at the GE junction likely reflects reflux disease.  Thickening of the rugal folds throughout the stomach likely reflects gastritis. Recommend endoscopy follow-up.    All CT scans at this facility use dose modulation, iterative reconstruction and/or weight based dosing when appropriate to reduce radiation dose to as low as reasonably achievable.      Electronically signed by: Aurelio Curtis MD  Date:    03/12/2020  Time:    08:01             Narrative:    EXAMINATION:  CT ABDOMEN PELVIS WITHOUT CONTRAST    CLINICAL HISTORY:  Abdominal pain, unspecified;    TECHNIQUE:  Routine 5 mm non-contrast  images of the abdomen and pelvis were done.  Sagittal and coronal reformats were also submitted for interpretation.    COMPARISON:  None    FINDINGS:  No consolidation.  Chronic interstitial thickening suspected at the right lung base.  Acute interstitial pneumonia not excluded.  No pleural effusion or pneumothorax identified heart size is normal.  Severe coronary disease.    The liver is normal in size and attenuation with no focal hepatic abnormality.  Moderate gallbladder distension.  No stones are identified.  No significant intrahepatic ductal dilatation.  Common bile duct is dilated measuring 11 mm without focal obstruction or stone.    The spleen, pancreas, and adrenal glands are unremarkable.  Benign splenic granuloma are noted.    Kidneys are small in size.  Nonobstructing stone lower pole of the right kidney.  Horseshoe configuration is noted.  Nonobstructing stone lower pole right kidney.  Mild left-sided hydronephrosis double-J ureteral stent is noted in satisfactory position.  Bladder demonstrates mild nonspecific bladder wall thickening which can be seen with cystitis..    Small hiatal hernia containing contrast and mild thickening at the GE junction likely reflects reflux disease.  Thickening of the rugal folds throughout the stomach likely reflects gastritis.  Recommend endoscopy follow-up.  Duodenal diverticulum is seen along the 3rd portion the duodenum.  Prominent small bowel loops are seen throughout the abdomen with transition suspected within the upper pelvis likely at the site of a bowel anastomosis with decompressed ileum distally.  Findings concerning for bowel obstruction.  Large bowel demonstrates fatty infiltration of the wall of the colon likely reflecting chronic inflammatory process.  Mild constipation.  Appendix is not seen..  Moderate diverticulosis seen throughout the descending and sigmoid colon.  No definite evidence for acute diverticulitis.  Trace ascites.  No free air.    There  is no evidence of lymph node enlargement in the abdomen or pelvis.  Shotty nodes are seen within the mesentery and retroperitoneum.    The abdominal aorta is small caliber with severe atherosclerotic calcifications.  Subclavian femoral bypass and fem-fem bypass grafts are noted.  Extensive clips are seen in the bilateral groin.    Moderate degenerative changes throughout the lumbar spine.  Chronic right rib fracture deformity involving the 7th rib laterally.  Chronic compression deformities at L3 and L1.                               X-Ray Abdomen Flat And Erect (Final result)  Result time 03/11/20 22:41:16    Final result by Aurelio Briceno MD (03/11/20 22:41:16)                 Impression:      Dilated loops of small bowel with multiple air-fluid levels consistent with a small-bowel obstruction.  No definite evidence of free air.      Electronically signed by: Aurelio Briceno MD  Date:    03/11/2020  Time:    22:41             Narrative:    EXAMINATION:  XR ABDOMEN FLAT AND ERECT    CLINICAL HISTORY:  Generalized abdominal pain    TECHNIQUE:  Flat and erect AP views of the abdomen were performed.    COMPARISON:  03/27/2019    FINDINGS:  Left ureteral stent.  Multiple air-fluid levels with distended small bowel loops consistent with a small bowel obstruction.                               RADIOLOGY REPORT (Final result)  Result time 03/13/20 17:14:06    Final result by Unknown User (03/13/20 17:14:06)

## 2020-03-14 NOTE — PROGRESS NOTES
Abdominal film show improvement.  Will advance from clear liquids to full liquids and supper and check x-rays in the morning

## 2020-03-14 NOTE — PROGRESS NOTES
[facility] -   Nephrology  Progress Note    Patient Name: Kodi Ribeiro  MRN: [id number]  Admission Date: 3/11/2020  Hospital Length of Stay: 2 days  Attending Provider: Markell Magaña MD   Primary Care Physician: Norma Nuñez MD  Principal Problem:SBO (small bowel obstruction)    Subjective:     HPI: Kodi Ribeiro is a  70-year-old  man  with history of hypertension , chronic kidney disease stage 4.  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 No BM yet. Renal function stable with creatinine at 2.7.     3/14/20: patient is feeling great today and reports having had BM last night. NG tube has been removed.         Review of patient's allergies indicates:   Allergen Reactions    Morphine Itching     Current Facility-Administered Medications   Medication Frequency    0.9%  NaCl infusion Continuous    albuterol-ipratropium 2.5 mg-0.5 mg/3 mL nebulizer solution 3 mL Q8H PRN    cefepime in dextrose 5 % 1 gram/50 mL IVPB 1 g Q24H    dextrose 10% (D10W) Bolus PRN    dextrose 10% (D10W) Bolus PRN    famotidine IVPB 20 mg Daily    glucagon (human recombinant) injection 1 mg PRN    glucose chewable tablet 16 g PRN    glucose chewable tablet 24 g PRN    hydrALAZINE injection 10 mg Q6H PRN    meperidine (PF) injection 12.5 mg Q4H PRN    nitroGLYCERIN SL tablet 0.4 mg Q5 Min PRN    ondansetron injection 4 mg Q6H PRN    promethazine (PHENERGAN) 6.25 mg in dextrose 5 % 50 mL IVPB Q6H PRN     sodium chloride 0.9% flush 10 mL PRN       Objective:     Vital Signs (Most Recent):  Temp: 98 °F (36.7 °C) (03/14/20 0736)  Pulse: 81 (03/14/20 0736)  Resp: 18 (03/14/20 0736)  BP: (!) 173/79 (03/14/20 0736)  SpO2: 98 % (03/14/20 0736)  O2 Device (Oxygen Therapy): room air (03/13/20 0729) Vital Signs (24h Range):  Temp:  [97.3 °F (36.3 °C)-98 °F (36.7 °C)] 98 °F (36.7 °C)  Pulse:  [80-98] 81  Resp:  [18-19] 18  SpO2:  [92 %-99 %] 98 %  BP: (152-182)/(69-84) 173/79     Weight: 67.6 kg (149 lb 0.5 oz) (03/13/20 2339)  Body mass index is 19.66 kg/m².  Body surface area is 1.87 meters squared.    I/O last 3 completed shifts:  In: 4443.3 [P.O.:200; I.V.:3993.3; IV Piggyback:250]  Out: 970 [Urine:840; Drains:130]    Physical Exam   Constitutional: He is oriented to person, place, and time. He appears well-developed. He is cooperative.   HENT:   Head: Normocephalic.   Nose: No rhinorrhea.   Mouth/Throat: Mucous membranes are normal. No oropharyngeal exudate.   Eyes: Pupils are equal, round, and reactive to light.   Neck: No thyroid mass present.   Cardiovascular: Normal rate, regular rhythm, S1 normal, S2 normal and intact distal pulses.   Pulmonary/Chest: Effort normal. No respiratory distress. He has no wheezes.   Abdominal: Soft. Bowel sounds are normal. He exhibits no distension. No hernia.   Musculoskeletal:   Right BKA. Left foot amputation.    Lymphadenopathy:     He has no cervical adenopathy.   Neurological: He is alert and oriented to person, place, and time.   Skin: Skin is warm and dry.   Psychiatric: He has a normal mood and affect.       Significant Labs:  Lab Results   Component Value Date    CREATININE 2.7 (H) 03/13/2020    BUN 48 (H) 03/13/2020     03/13/2020    K 5.0 03/13/2020     03/13/2020    CO2 20 (L) 03/13/2020     Lab Results   Component Value Date    .0 (H) 02/26/2020    CALCIUM 9.6 03/13/2020    PHOS 5.4 (H) 03/13/2020       Lab Results   Component Value Date    ALBUMIN 3.6  03/12/2020     Lab Results   Component Value Date    WBC 9.63 03/13/2020    HGB 8.5 (L) 03/13/2020    HCT 28.9 (L) 03/13/2020    MCV 94 03/13/2020     03/13/2020       Recent Labs   Lab 03/13/20  0552   MG 2.5         Significant Imaging:  Imaging Results          X-Ray Chest AP Portable (Final result)  Result time 03/12/20 09:22:40    Final result by Saji Issa MD (03/12/20 09:22:40)                 Impression:      As above.      Electronically signed by: Saji Issa  Date:    03/12/2020  Time:    09:22             Narrative:    EXAMINATION:  XR CHEST AP PORTABLE    CLINICAL HISTORY:  . Presence of other specified devices    TECHNIQUE:  Single frontal portable view of the chest was performed.    COMPARISON:  02/02/2020    FINDINGS:  Support devices:    Esophagogastric tube in satisfactory position overlying the body of the stomach.    Chronic interstitial coarsening.  Linear reticular interstitial opacification reduced in severity compared to 02/02/2020.  No pneumothorax.  Suggestion of tiny right pleural effusion unchanged.  Surgical clips right axilla and left cervical region.    The cardiac silhouette is normal in size. The hilar and mediastinal contours are unremarkable.    Bones are intact.                               CT Abdomen Pelvis  Without Contrast (Final result)  Result time 03/12/20 08:01:28    Final result by Aurelio Curtis MD (03/12/20 08:01:28)                 Impression:      Prominent small bowel loops are seen throughout the abdomen with transition suspected within the upper pelvis likely at the site of a bowel anastomosis with decompressed ileum distally.  Small bowel loops are thickened at the level near the obstruction without evidence of pneumatosis.  Findings concerning for bowel obstruction.    Small hiatal hernia containing contrast and mild thickening at the GE junction likely reflects reflux disease.  Thickening of the rugal folds throughout the stomach likely reflects  gastritis. Recommend endoscopy follow-up.    All CT scans at this facility use dose modulation, iterative reconstruction and/or weight based dosing when appropriate to reduce radiation dose to as low as reasonably achievable.      Electronically signed by: Aurelio Curtis MD  Date:    03/12/2020  Time:    08:01             Narrative:    EXAMINATION:  CT ABDOMEN PELVIS WITHOUT CONTRAST    CLINICAL HISTORY:  Abdominal pain, unspecified;    TECHNIQUE:  Routine 5 mm non-contrast images of the abdomen and pelvis were done.  Sagittal and coronal reformats were also submitted for interpretation.    COMPARISON:  None    FINDINGS:  No consolidation.  Chronic interstitial thickening suspected at the right lung base.  Acute interstitial pneumonia not excluded.  No pleural effusion or pneumothorax identified heart size is normal.  Severe coronary disease.    The liver is normal in size and attenuation with no focal hepatic abnormality.  Moderate gallbladder distension.  No stones are identified.  No significant intrahepatic ductal dilatation.  Common bile duct is dilated measuring 11 mm without focal obstruction or stone.    The spleen, pancreas, and adrenal glands are unremarkable.  Benign splenic granuloma are noted.    Kidneys are small in size.  Nonobstructing stone lower pole of the right kidney.  Horseshoe configuration is noted.  Nonobstructing stone lower pole right kidney.  Mild left-sided hydronephrosis double-J ureteral stent is noted in satisfactory position.  Bladder demonstrates mild nonspecific bladder wall thickening which can be seen with cystitis..    Small hiatal hernia containing contrast and mild thickening at the GE junction likely reflects reflux disease.  Thickening of the rugal folds throughout the stomach likely reflects gastritis.  Recommend endoscopy follow-up.  Duodenal diverticulum is seen along the 3rd portion the duodenum.  Prominent small bowel loops are seen throughout the abdomen with transition  suspected within the upper pelvis likely at the site of a bowel anastomosis with decompressed ileum distally.  Findings concerning for bowel obstruction.  Large bowel demonstrates fatty infiltration of the wall of the colon likely reflecting chronic inflammatory process.  Mild constipation.  Appendix is not seen..  Moderate diverticulosis seen throughout the descending and sigmoid colon.  No definite evidence for acute diverticulitis.  Trace ascites.  No free air.    There is no evidence of lymph node enlargement in the abdomen or pelvis.  Shotty nodes are seen within the mesentery and retroperitoneum.    The abdominal aorta is small caliber with severe atherosclerotic calcifications.  Subclavian femoral bypass and fem-fem bypass grafts are noted.  Extensive clips are seen in the bilateral groin.    Moderate degenerative changes throughout the lumbar spine.  Chronic right rib fracture deformity involving the 7th rib laterally.  Chronic compression deformities at L3 and L1.                               X-Ray Abdomen Flat And Erect (Final result)  Result time 03/11/20 22:41:16    Final result by Aurelio Briceno MD (03/11/20 22:41:16)                 Impression:      Dilated loops of small bowel with multiple air-fluid levels consistent with a small-bowel obstruction.  No definite evidence of free air.      Electronically signed by: Aurelio Briceno MD  Date:    03/11/2020  Time:    22:41             Narrative:    EXAMINATION:  XR ABDOMEN FLAT AND ERECT    CLINICAL HISTORY:  Generalized abdominal pain    TECHNIQUE:  Flat and erect AP views of the abdomen were performed.    COMPARISON:  03/27/2019    FINDINGS:  Left ureteral stent.  Multiple air-fluid levels with distended small bowel loops consistent with a small bowel obstruction.                               RADIOLOGY REPORT (Final result)  Result time 03/13/20 17:14:06    Final result by Unknown User (03/13/20 17:14:06)                                     Assessment/Plan:     CKD (chronic kidney disease) stage 4, GFR 15-29 ml/min  1. CKD stage 4 :  Patient baseline serum creatinine fluctuates between 2 and 3 mg/dL, CT scan of the abdomen shows mild hydronephrosis on the left side, patient has history of ureteric stricture and stent placement on that side, also has nonobstructing stones bilaterally, monitor renal function closely, avoid ACE inhibitors/ARB/NSAIDs at this time, continue gentle hydration as he is on NPO status, creatinine was 2.7 yesterday. Will follow up on today's labs.     2.  Hypertension - has been fluctuating. Make adjustments as needed.    3.  Small bowel obstruction - management per General surgery    4.  Anemia - multifactorial, monitor hemoglobin, PRBC transfusion when indicated.     5.  Electrolytes: Borderline hyperphosphatemia. Monitor.             Thank you for your consult. I will follow-up with patient. Please contact us if you have any additional questions.    Thad Padilla MD  Nephrology  [facility] [place]

## 2020-03-14 NOTE — ASSESSMENT & PLAN NOTE
71yo M with multiple previous abdominal operations with SBO    Patient has clinically improved.  NG tube was removed.  Abdominal films ordered.    If the abdominal film showing normal small bowel gas pattern then clear liquids to full liquids.

## 2020-03-15 LAB
ABO + RH BLD: NORMAL
ALBUMIN SERPL BCP-MCNC: 2.9 G/DL (ref 3.5–5.2)
ALP SERPL-CCNC: 142 U/L (ref 55–135)
ALT SERPL W/O P-5'-P-CCNC: 7 U/L (ref 10–44)
ANION GAP SERPL CALC-SCNC: 9 MMOL/L (ref 8–16)
AST SERPL-CCNC: 11 U/L (ref 10–40)
BACTERIA UR CULT: ABNORMAL
BASOPHILS # BLD AUTO: 0.01 K/UL (ref 0–0.2)
BASOPHILS NFR BLD: 0.2 % (ref 0–1.9)
BILIRUB SERPL-MCNC: 0.2 MG/DL (ref 0.1–1)
BLD GP AB SCN CELLS X3 SERPL QL: NORMAL
BLD PROD TYP BPU: NORMAL
BLOOD UNIT EXPIRATION DATE: NORMAL
BLOOD UNIT TYPE CODE: 5100
BLOOD UNIT TYPE: NORMAL
BUN SERPL-MCNC: 26 MG/DL (ref 8–23)
CALCIUM SERPL-MCNC: 8.5 MG/DL (ref 8.7–10.5)
CHLORIDE SERPL-SCNC: 112 MMOL/L (ref 95–110)
CO2 SERPL-SCNC: 18 MMOL/L (ref 23–29)
CODING SYSTEM: NORMAL
CREAT SERPL-MCNC: 1.4 MG/DL (ref 0.5–1.4)
DIFFERENTIAL METHOD: ABNORMAL
DISPENSE STATUS: NORMAL
EOSINOPHIL # BLD AUTO: 0.3 K/UL (ref 0–0.5)
EOSINOPHIL NFR BLD: 5.2 % (ref 0–8)
ERYTHROCYTE [DISTWIDTH] IN BLOOD BY AUTOMATED COUNT: 16.6 % (ref 11.5–14.5)
EST. GFR  (AFRICAN AMERICAN): 58 ML/MIN/1.73 M^2
EST. GFR  (NON AFRICAN AMERICAN): 50 ML/MIN/1.73 M^2
GLUCOSE SERPL-MCNC: 84 MG/DL (ref 70–110)
HCT VFR BLD AUTO: 22.7 % (ref 40–54)
HGB BLD-MCNC: 6.5 G/DL (ref 14–18)
IMM GRANULOCYTES # BLD AUTO: 0.02 K/UL (ref 0–0.04)
IMM GRANULOCYTES NFR BLD AUTO: 0.3 % (ref 0–0.5)
LYMPHOCYTES # BLD AUTO: 1.3 K/UL (ref 1–4.8)
LYMPHOCYTES NFR BLD: 22.4 % (ref 18–48)
MAGNESIUM SERPL-MCNC: 2 MG/DL (ref 1.6–2.6)
MCH RBC QN AUTO: 27.3 PG (ref 27–31)
MCHC RBC AUTO-ENTMCNC: 28.6 G/DL (ref 32–36)
MCV RBC AUTO: 95 FL (ref 82–98)
MONOCYTES # BLD AUTO: 0.6 K/UL (ref 0.3–1)
MONOCYTES NFR BLD: 9.9 % (ref 4–15)
NEUTROPHILS # BLD AUTO: 3.7 K/UL (ref 1.8–7.7)
NEUTROPHILS NFR BLD: 62 % (ref 38–73)
NRBC BLD-RTO: 0 /100 WBC
NUM UNITS TRANS PACKED RBC: NORMAL
PLATELET # BLD AUTO: 220 K/UL (ref 150–350)
PMV BLD AUTO: 9.3 FL (ref 9.2–12.9)
POTASSIUM SERPL-SCNC: 4 MMOL/L (ref 3.5–5.1)
PROT SERPL-MCNC: 6.4 G/DL (ref 6–8.4)
RBC # BLD AUTO: 2.38 M/UL (ref 4.6–6.2)
SODIUM SERPL-SCNC: 139 MMOL/L (ref 136–145)
WBC # BLD AUTO: 5.94 K/UL (ref 3.9–12.7)

## 2020-03-15 PROCEDURE — S0028 INJECTION, FAMOTIDINE, 20 MG: HCPCS | Mod: HCNC | Performed by: FAMILY MEDICINE

## 2020-03-15 PROCEDURE — 80053 COMPREHEN METABOLIC PANEL: CPT | Mod: HCNC

## 2020-03-15 PROCEDURE — P9016 RBC LEUKOCYTES REDUCED: HCPCS | Mod: HCNC

## 2020-03-15 PROCEDURE — 99233 PR SUBSEQUENT HOSPITAL CARE,LEVL III: ICD-10-PCS | Mod: HCNC,,, | Performed by: SURGERY

## 2020-03-15 PROCEDURE — 11000001 HC ACUTE MED/SURG PRIVATE ROOM: Mod: HCNC

## 2020-03-15 PROCEDURE — 99233 SBSQ HOSP IP/OBS HIGH 50: CPT | Mod: HCNC,,, | Performed by: INTERNAL MEDICINE

## 2020-03-15 PROCEDURE — 86850 RBC ANTIBODY SCREEN: CPT | Mod: HCNC

## 2020-03-15 PROCEDURE — 99233 PR SUBSEQUENT HOSPITAL CARE,LEVL III: ICD-10-PCS | Mod: HCNC,,, | Performed by: INTERNAL MEDICINE

## 2020-03-15 PROCEDURE — 86920 COMPATIBILITY TEST SPIN: CPT | Mod: HCNC

## 2020-03-15 PROCEDURE — 36430 TRANSFUSION BLD/BLD COMPNT: CPT | Mod: HCNC

## 2020-03-15 PROCEDURE — 25000003 PHARM REV CODE 250: Mod: HCNC | Performed by: NURSE PRACTITIONER

## 2020-03-15 PROCEDURE — 63600175 PHARM REV CODE 636 W HCPCS: Mod: HCNC | Performed by: INTERNAL MEDICINE

## 2020-03-15 PROCEDURE — 36415 COLL VENOUS BLD VENIPUNCTURE: CPT | Mod: HCNC

## 2020-03-15 PROCEDURE — 25000003 PHARM REV CODE 250: Mod: HCNC | Performed by: FAMILY MEDICINE

## 2020-03-15 PROCEDURE — 99233 SBSQ HOSP IP/OBS HIGH 50: CPT | Mod: HCNC,,, | Performed by: SURGERY

## 2020-03-15 PROCEDURE — 83735 ASSAY OF MAGNESIUM: CPT | Mod: HCNC

## 2020-03-15 PROCEDURE — 85025 COMPLETE CBC W/AUTO DIFF WBC: CPT | Mod: HCNC

## 2020-03-15 RX ORDER — ATORVASTATIN CALCIUM 40 MG/1
40 TABLET, FILM COATED ORAL DAILY
Status: DISCONTINUED | OUTPATIENT
Start: 2020-03-15 | End: 2020-03-17 | Stop reason: HOSPADM

## 2020-03-15 RX ORDER — ISOSORBIDE MONONITRATE 60 MG/1
120 TABLET, EXTENDED RELEASE ORAL DAILY
Status: DISCONTINUED | OUTPATIENT
Start: 2020-03-15 | End: 2020-03-17 | Stop reason: HOSPADM

## 2020-03-15 RX ORDER — AMLODIPINE BESYLATE 10 MG/1
10 TABLET ORAL DAILY
Status: DISCONTINUED | OUTPATIENT
Start: 2020-03-15 | End: 2020-03-17 | Stop reason: HOSPADM

## 2020-03-15 RX ORDER — FERROUS GLUCONATE 324(38)MG
324 TABLET ORAL 2 TIMES DAILY WITH MEALS
Status: DISCONTINUED | OUTPATIENT
Start: 2020-03-15 | End: 2020-03-17 | Stop reason: HOSPADM

## 2020-03-15 RX ORDER — HYDRALAZINE HYDROCHLORIDE 25 MG/1
25 TABLET, FILM COATED ORAL EVERY 12 HOURS
Status: DISCONTINUED | OUTPATIENT
Start: 2020-03-15 | End: 2020-03-17 | Stop reason: HOSPADM

## 2020-03-15 RX ORDER — HYDROCODONE BITARTRATE AND ACETAMINOPHEN 500; 5 MG/1; MG/1
TABLET ORAL
Status: DISCONTINUED | OUTPATIENT
Start: 2020-03-15 | End: 2020-03-17 | Stop reason: HOSPADM

## 2020-03-15 RX ORDER — SERTRALINE HYDROCHLORIDE 50 MG/1
50 TABLET, FILM COATED ORAL DAILY
Status: DISCONTINUED | OUTPATIENT
Start: 2020-03-15 | End: 2020-03-17 | Stop reason: HOSPADM

## 2020-03-15 RX ORDER — CARVEDILOL 12.5 MG/1
25 TABLET ORAL 2 TIMES DAILY WITH MEALS
Status: DISCONTINUED | OUTPATIENT
Start: 2020-03-15 | End: 2020-03-17 | Stop reason: HOSPADM

## 2020-03-15 RX ORDER — ASPIRIN 81 MG/1
81 TABLET ORAL DAILY
Status: DISCONTINUED | OUTPATIENT
Start: 2020-03-15 | End: 2020-03-17 | Stop reason: HOSPADM

## 2020-03-15 RX ADMIN — ASPIRIN 81 MG: 81 TABLET, COATED ORAL at 09:03

## 2020-03-15 RX ADMIN — Medication 1 TABLET: at 09:03

## 2020-03-15 RX ADMIN — SODIUM CHLORIDE: 0.9 INJECTION, SOLUTION INTRAVENOUS at 03:03

## 2020-03-15 RX ADMIN — CARVEDILOL 25 MG: 12.5 TABLET, FILM COATED ORAL at 06:03

## 2020-03-15 RX ADMIN — HYDRALAZINE HYDROCHLORIDE 25 MG: 25 TABLET, FILM COATED ORAL at 09:03

## 2020-03-15 RX ADMIN — SODIUM CHLORIDE: 0.9 INJECTION, SOLUTION INTRAVENOUS at 11:03

## 2020-03-15 RX ADMIN — FAMOTIDINE 20 MG: 20 INJECTION, SOLUTION INTRAVENOUS at 09:03

## 2020-03-15 RX ADMIN — SERTRALINE HYDROCHLORIDE 50 MG: 50 TABLET ORAL at 09:03

## 2020-03-15 RX ADMIN — ATORVASTATIN CALCIUM 40 MG: 40 TABLET, FILM COATED ORAL at 09:03

## 2020-03-15 RX ADMIN — CARVEDILOL 25 MG: 12.5 TABLET, FILM COATED ORAL at 09:03

## 2020-03-15 RX ADMIN — ISOSORBIDE MONONITRATE 120 MG: 60 TABLET, EXTENDED RELEASE ORAL at 09:03

## 2020-03-15 RX ADMIN — SODIUM CHLORIDE: 0.9 INJECTION, SOLUTION INTRAVENOUS at 12:03

## 2020-03-15 RX ADMIN — AMLODIPINE BESYLATE 10 MG: 10 TABLET ORAL at 09:03

## 2020-03-15 RX ADMIN — HYDRALAZINE HYDROCHLORIDE 10 MG: 20 INJECTION INTRAMUSCULAR; INTRAVENOUS at 11:03

## 2020-03-15 NOTE — PROGRESS NOTES
[facility] Hospital Medicine  Progress Note    Patient Name: Kodi Ribeiro  MRN: [id number]  Patient Class: IP- Inpatient   Admission Date: 3/11/2020  Length of Stay: 3 days  Attending Physician: Markell Magaña MD  Primary Care Provider: Norma Nuñez MD        Subjective:     Principal Problem:SBO (small bowel obstruction)        HPI:    70 y.o. male patient with a PMHx of anemia, GERD, HTN, HLD, MI  ,previous recurrent abdominal surgery for AAA repair who presented with abdominal pain/vomiting that worsened on the day of admit . Pain was described as sharp, 6/10 in intensity .  Since admission, CT scan of the abdomen showed     Per [facility] radiology, pt's CT results:   1. Reticulonodular densities in the R lower lung may represent an infectious/inflammatory process.   2. Dilated fluid and gas-filled small bowel loops with transition point in LLQ, compatible with SBO.  3. Trace ascites.   4. Mild L hydroureteronephrosis with L ureteral stent noted. No definite obstructing stone identified.     Gen surgery was notified in the ED. He had a previous episode of SBO in 2017 which resolved without surgery .His wife is by bedside. He has NG tube in situ.     Overview/Hospital Course:  Mr Ribeiro is a 70 year old male admitted with small bowl obstruction, general surgery following. He continues to be NPO with NGT intact to low suction. Vital signs and labs stable. 3/14/20 The patient reports improvement in symptoms overnight, no further abd distension noted. The patient reports having a large BM overnight. + bowel sounds. NG tube D/Cd and diet advanced to clear liquid. 3/15/20 Hgb 6.5 today, will transfuse 1 unit PRBC. Repeat imaging today, consistent with partial small bowel obstruction. General surgery recommending small bpwel follow through in am. NPO after MN.     Interval History: Hgb 6.5 today, will transfuse 1 unit PRBC. Repeat imaging today, consistent with partial small bowel obstruction. General  surgery recommending small bpwel follow through in am. NPO after MN.       Review of Systems   Constitutional: Positive for appetite change (decreased). Negative for activity change, chills, diaphoresis, fatigue, fever and unexpected weight change.   HENT: Negative for congestion, ear discharge, facial swelling, rhinorrhea, sinus pressure, sneezing, sore throat and trouble swallowing.    Eyes: Negative for discharge, redness and visual disturbance.   Respiratory: Negative for apnea, cough, choking, chest tightness, shortness of breath, wheezing and stridor.    Cardiovascular: Negative for chest pain, palpitations and leg swelling.   Gastrointestinal: Positive for abdominal pain and nausea. Negative for abdominal distention, anal bleeding, blood in stool, constipation, diarrhea and vomiting.   Endocrine: Negative for cold intolerance and heat intolerance.   Genitourinary: Negative for difficulty urinating, discharge, dysuria, frequency and hematuria.   Musculoskeletal: Negative for arthralgias, back pain, gait problem, joint swelling, myalgias, neck pain and neck stiffness.   Skin: Negative for color change, pallor and rash.   Neurological: Negative for dizziness, tremors, seizures, syncope, facial asymmetry, speech difficulty, weakness, light-headedness, numbness and headaches.   Psychiatric/Behavioral: Negative for agitation, behavioral problems, confusion, hallucinations, sleep disturbance and suicidal ideas. The patient is not nervous/anxious.    All other systems reviewed and are negative.    Objective:     Vital Signs (Most Recent):  Temp: 97.9 °F (36.6 °C) (03/15/20 1354)  Pulse: (!) 59 (03/15/20 1354)  Resp: 17 (03/15/20 1354)  BP: (!) 143/64 (03/15/20 1354)  SpO2: 96 % (03/15/20 1354) Vital Signs (24h Range):  Temp:  [97.6 °F (36.4 °C)-98.1 °F (36.7 °C)] 97.9 °F (36.6 °C)  Pulse:  [59-81] 59  Resp:  [17-18] 17  SpO2:  [96 %-99 %] 96 %  BP: (120-178)/(59-87) 143/64     Weight: 67.6 kg (149 lb 0.5 oz)  Body  mass index is 19.66 kg/m².    Intake/Output Summary (Last 24 hours) at 3/15/2020 1531  Last data filed at 3/15/2020 1445  Gross per 24 hour   Intake 2667.92 ml   Output 1140 ml   Net 1527.92 ml      Physical Exam   Constitutional: He is oriented to person, place, and time. He appears well-developed and well-nourished. No distress.   HENT:   Head: Normocephalic and atraumatic.   Mouth/Throat: No oropharyngeal exudate.   Eyes: Pupils are equal, round, and reactive to light. Conjunctivae are normal. Right eye exhibits no discharge. Left eye exhibits no discharge.   Neck: Normal range of motion. Neck supple. No JVD present.   Cardiovascular: Normal rate, regular rhythm, normal heart sounds and intact distal pulses. Exam reveals no gallop and no friction rub.   No murmur heard.  Pulmonary/Chest: Effort normal and breath sounds normal. No stridor. No respiratory distress. He has no wheezes. He has no rales. He exhibits no tenderness.   Abdominal: Soft. Bowel sounds are normal. He exhibits no distension and no mass. There is no tenderness. There is no rebound and no guarding. No hernia.   Musculoskeletal: He exhibits no edema, tenderness or deformity.   Neurological: He is alert and oriented to person, place, and time.   Skin: Skin is warm and dry. Capillary refill takes less than 2 seconds. No rash noted. He is not diaphoretic. No erythema.   Psychiatric: He has a normal mood and affect. His behavior is normal.   Nursing note and vitals reviewed.      Significant Labs: All pertinent labs within the past 24 hours have been reviewed.    Significant Imaging:   Imaging Results          X-Ray Chest AP Portable (Final result)  Result time 03/12/20 09:22:40    Final result by Saji Issa MD (03/12/20 09:22:40)                 Impression:      As above.      Electronically signed by: Saji Issa  Date:    03/12/2020  Time:    09:22             Narrative:    EXAMINATION:  XR CHEST AP PORTABLE    CLINICAL HISTORY:  . Presence  of other specified devices    TECHNIQUE:  Single frontal portable view of the chest was performed.    COMPARISON:  02/02/2020    FINDINGS:  Support devices:    Esophagogastric tube in satisfactory position overlying the body of the stomach.    Chronic interstitial coarsening.  Linear reticular interstitial opacification reduced in severity compared to 02/02/2020.  No pneumothorax.  Suggestion of tiny right pleural effusion unchanged.  Surgical clips right axilla and left cervical region.    The cardiac silhouette is normal in size. The hilar and mediastinal contours are unremarkable.    Bones are intact.                               CT Abdomen Pelvis  Without Contrast (Final result)  Result time 03/12/20 08:01:28    Final result by Aurelio Curtis MD (03/12/20 08:01:28)                 Impression:      Prominent small bowel loops are seen throughout the abdomen with transition suspected within the upper pelvis likely at the site of a bowel anastomosis with decompressed ileum distally.  Small bowel loops are thickened at the level near the obstruction without evidence of pneumatosis.  Findings concerning for bowel obstruction.    Small hiatal hernia containing contrast and mild thickening at the GE junction likely reflects reflux disease.  Thickening of the rugal folds throughout the stomach likely reflects gastritis. Recommend endoscopy follow-up.    All CT scans at this facility use dose modulation, iterative reconstruction and/or weight based dosing when appropriate to reduce radiation dose to as low as reasonably achievable.      Electronically signed by: Aurelio Curtis MD  Date:    03/12/2020  Time:    08:01             Narrative:    EXAMINATION:  CT ABDOMEN PELVIS WITHOUT CONTRAST    CLINICAL HISTORY:  Abdominal pain, unspecified;    TECHNIQUE:  Routine 5 mm non-contrast images of the abdomen and pelvis were done.  Sagittal and coronal reformats were also submitted for  interpretation.    COMPARISON:  None    FINDINGS:  No consolidation.  Chronic interstitial thickening suspected at the right lung base.  Acute interstitial pneumonia not excluded.  No pleural effusion or pneumothorax identified heart size is normal.  Severe coronary disease.    The liver is normal in size and attenuation with no focal hepatic abnormality.  Moderate gallbladder distension.  No stones are identified.  No significant intrahepatic ductal dilatation.  Common bile duct is dilated measuring 11 mm without focal obstruction or stone.    The spleen, pancreas, and adrenal glands are unremarkable.  Benign splenic granuloma are noted.    Kidneys are small in size.  Nonobstructing stone lower pole of the right kidney.  Horseshoe configuration is noted.  Nonobstructing stone lower pole right kidney.  Mild left-sided hydronephrosis double-J ureteral stent is noted in satisfactory position.  Bladder demonstrates mild nonspecific bladder wall thickening which can be seen with cystitis..    Small hiatal hernia containing contrast and mild thickening at the GE junction likely reflects reflux disease.  Thickening of the rugal folds throughout the stomach likely reflects gastritis.  Recommend endoscopy follow-up.  Duodenal diverticulum is seen along the 3rd portion the duodenum.  Prominent small bowel loops are seen throughout the abdomen with transition suspected within the upper pelvis likely at the site of a bowel anastomosis with decompressed ileum distally.  Findings concerning for bowel obstruction.  Large bowel demonstrates fatty infiltration of the wall of the colon likely reflecting chronic inflammatory process.  Mild constipation.  Appendix is not seen..  Moderate diverticulosis seen throughout the descending and sigmoid colon.  No definite evidence for acute diverticulitis.  Trace ascites.  No free air.    There is no evidence of lymph node enlargement in the abdomen or pelvis.  Shotty nodes are seen within  the mesentery and retroperitoneum.    The abdominal aorta is small caliber with severe atherosclerotic calcifications.  Subclavian femoral bypass and fem-fem bypass grafts are noted.  Extensive clips are seen in the bilateral groin.    Moderate degenerative changes throughout the lumbar spine.  Chronic right rib fracture deformity involving the 7th rib laterally.  Chronic compression deformities at L3 and L1.                               X-Ray Abdomen Flat And Erect (Final result)  Result time 03/11/20 22:41:16    Final result by Aurelio Briceon MD (03/11/20 22:41:16)                 Impression:      Dilated loops of small bowel with multiple air-fluid levels consistent with a small-bowel obstruction.  No definite evidence of free air.      Electronically signed by: Aurelio Briceno MD  Date:    03/11/2020  Time:    22:41             Narrative:    EXAMINATION:  XR ABDOMEN FLAT AND ERECT    CLINICAL HISTORY:  Generalized abdominal pain    TECHNIQUE:  Flat and erect AP views of the abdomen were performed.    COMPARISON:  03/27/2019    FINDINGS:  Left ureteral stent.  Multiple air-fluid levels with distended small bowel loops consistent with a small bowel obstruction.                               RADIOLOGY REPORT (Final result)  Result time 03/13/20 17:14:06    Final result by Unknown User (03/13/20 17:14:06)                                      Assessment/Plan:      * SBO (small bowel obstruction)  NG tube to LWS   NPO   General surgery following   IVF   OOB ambulate   3/14/20  The patient reports improvement in symptoms overnight, no further abd distension noted. The patient reports having a large BM overnight. + bowel sounds. NG tube D/Cd and diet advanced to clear liquid.   3/15/20 Repeat imaging today, consistent with partial small bowel obstruction. General surgery recommending small bpwel follow through in am. NPO after MN.         CKD (chronic kidney disease) stage 4, GFR 15-29 ml/min  Will avoid nephrotoxic meds ,  monitor serum creatinine     3/13  Creatinine 2.7  Renal function as baseline   Monitor       Anemia  Will monitor closely, transfuse as needed   3/15/20 Hgb 6.5 today, will transfuse 1 unit PRBC.     Essential hypertension  He is NPO , will use hydralazine PRN   3/15/20 Resume home meds       VTE Risk Mitigation (From admission, onward)         Ordered     IP VTE HIGH RISK PATIENT  Once      03/12/20 0025     Reason for no Mechanical VTE Prophylaxis  Once     Question:  Reasons:  Answer:  Physician Provided (leave comment)    03/12/20 0025     Place sequential compression device  Until discontinued      03/12/20 0025                      Brennen Weiner NP  Department of Hospital Medicine   [facility]

## 2020-03-15 NOTE — SUBJECTIVE & OBJECTIVE
Interval History:  Patient is tolerated full liquids.  Abdominal x-ray shows some dilated bowel suggestive of partial bowel obstruction.  Make NPO after midnight and obtain a Gastrografin small-bowel follow-through on Monday    Medications:  Continuous Infusions:   sodium chloride 0.9% 100 mL/hr at 03/15/20 0022     Scheduled Meds:   amLODIPine  10 mg Oral Daily    aspirin  81 mg Oral Daily    atorvastatin  40 mg Oral Daily    carvediloL  25 mg Oral BID WM    famotidine  20 mg Intravenous Daily    ferrous gluconate  324 mg Oral BID WM    folic acid-vit B6-vit B12 2.5-25-2 mg  1 tablet Oral Daily    hydrALAZINE  25 mg Oral Q12H    isosorbide mononitrate  120 mg Oral Daily    sertraline  50 mg Oral Daily     PRN Meds:sodium chloride, albuterol-ipratropium, Dextrose 10% Bolus, Dextrose 10% Bolus, glucagon (human recombinant), glucose, glucose, hydrALAZINE, nitroGLYCERIN, ondansetron, promethazine (PHENERGAN) IVPB, sodium chloride 0.9%     Review of patient's allergies indicates:   Allergen Reactions    Morphine Itching     Objective:     Vital Signs (Most Recent):  Temp: 97.7 °F (36.5 °C) (03/15/20 0738)  Pulse: 65 (03/15/20 0738)  Resp: 18 (03/15/20 0738)  BP: (!) 166/87 (03/15/20 0738)  SpO2: 98 % (03/15/20 0738) Vital Signs (24h Range):  Temp:  [97.6 °F (36.4 °C)-98.1 °F (36.7 °C)] 97.7 °F (36.5 °C)  Pulse:  [63-89] 65  Resp:  [18] 18  SpO2:  [97 %-99 %] 98 %  BP: (151-178)/(71-87) 166/87     Weight: 67.6 kg (149 lb 0.5 oz)  Body mass index is 19.66 kg/m².    Intake/Output - Last 3 Shifts       03/13 0700 - 03/14 0659 03/14 0700 - 03/15 0659 03/15 0700 - 03/16 0659    P.O. 200 600     I.V. (mL/kg) 1000 (14.8) 911.7 (13.5)     IV Piggyback 150      Total Intake(mL/kg) 1350 (20) 1511.7 (22.4)     Urine (mL/kg/hr) 440 (0.3) 640 (0.4) 300 (2)    Drains 80      Total Output 520 640 300    Net +830 +871.7 -300                 Physical Exam   Constitutional: He is oriented to person, place, and time. He appears  well-developed and well-nourished.   HENT:   Head: Normocephalic and atraumatic.   Neck: Normal range of motion.   Cardiovascular: Normal rate, regular rhythm and normal heart sounds.   Pulmonary/Chest: Effort normal and breath sounds normal.   Abdominal: Soft. Bowel sounds are normal. He exhibits no distension. There is no tenderness.   Genitourinary: Penis normal.   Neurological: He is alert and oriented to person, place, and time.   Skin: Skin is warm. Capillary refill takes less than 2 seconds.   Psychiatric: He has a normal mood and affect. His behavior is normal. Judgment and thought content normal.   Nursing note reviewed.      Significant Labs:  CBC:   Recent Labs   Lab 03/15/20  0552   WBC 5.94   RBC 2.38*   HGB 6.5*   HCT 22.7*      MCV 95   MCH 27.3   MCHC 28.6*     BMP:   Recent Labs   Lab 03/15/20  0552   GLU 84      K 4.0   *   CO2 18*   BUN 26*   CREATININE 1.4   CALCIUM 8.5*   MG 2.0     CMP:   Recent Labs   Lab 03/15/20  0552   GLU 84   CALCIUM 8.5*   ALBUMIN 2.9*   PROT 6.4      K 4.0   CO2 18*   *   BUN 26*   CREATININE 1.4   ALKPHOS 142*   ALT 7*   AST 11   BILITOT 0.2       Significant Diagnostics:  Abdominal x-ray     Stefan Alvarado MD 3/15/2020 Routine      Narrative     EXAMINATION:  XR ABDOMEN FLAT AND ERECT    CLINICAL HISTORY:  Continued small follow-up on small bowel obstruction.  Patient has been advanced to a full liquid diet;    COMPARISON:  03/14/2020    FINDINGS:  Some moderate upright views the abdomen demonstrate mild to moderate small bowel dilatation with numerous air-fluid levels detected.  Gas noted throughout the nondistended colon a small amount of residual oral contrast within the colon and diverticulosis noted.  Findings suggestive of a small bowel obstruction.  No significant interval change since 03/14/2020. Left ureteral stent is in place.  Vascular calcifications noted.  Surgical clips overlie the spine, pelvis and hips  bilaterally.No suspicious bony abnormality.    PRESSURE:    Nonobstructive bowel gas pattern.      Impression       FINDINGS SUGGESTIVE OF PARTIAL SMALL BOWEL OBSTRUCTION.  NO SIGNIFICANT INTERVAL CHANGE SINCE 03/14/2020.

## 2020-03-15 NOTE — PROGRESS NOTES
[facility] -   General Surgery  Progress Note    Subjective:     History of Present Illness:  70-year-old male with a past medical history significant for anemia, GERD, HTN, HLD, CKD, CAD and previous AAA repair who presented to the [facility] emergency department with abdominal pain, nausea and vomiting x1 day.  Patient states the pain and nausea began while simultaneously, with pain being sharp and moderate in severity.  Patient states that his last bowel movement was 2 days ago.  Workup in emergency department was worrisome for a small-bowel obstruction. Patient was admitted to the medical service and surgery is consulted for evaluation.  Patient denies any current fever, chills, hematochezia or melena.  Of note, patient had a similar episode in 2017 that resolved with non operative management. Multiple previous abdominal operations, including AAA repair, AAA graft excision.     Post-Op Info:  * No surgery found *         Interval History:  Patient is tolerated full liquids.  Abdominal x-ray shows some dilated bowel suggestive of partial bowel obstruction.  Make NPO after midnight and obtain a Gastrografin small-bowel follow-through on Monday    Medications:  Continuous Infusions:   sodium chloride 0.9% 100 mL/hr at 03/15/20 0022     Scheduled Meds:   amLODIPine  10 mg Oral Daily    aspirin  81 mg Oral Daily    atorvastatin  40 mg Oral Daily    carvediloL  25 mg Oral BID WM    famotidine  20 mg Intravenous Daily    ferrous gluconate  324 mg Oral BID WM    folic acid-vit B6-vit B12 2.5-25-2 mg  1 tablet Oral Daily    hydrALAZINE  25 mg Oral Q12H    isosorbide mononitrate  120 mg Oral Daily    sertraline  50 mg Oral Daily     PRN Meds:sodium chloride, albuterol-ipratropium, Dextrose 10% Bolus, Dextrose 10% Bolus, glucagon (human recombinant), glucose, glucose, hydrALAZINE, nitroGLYCERIN, ondansetron, promethazine (PHENERGAN) IVPB, sodium chloride 0.9%     Review of patient's allergies  indicates:   Allergen Reactions    Morphine Itching     Objective:     Vital Signs (Most Recent):  Temp: 97.7 °F (36.5 °C) (03/15/20 0738)  Pulse: 65 (03/15/20 0738)  Resp: 18 (03/15/20 0738)  BP: (!) 166/87 (03/15/20 0738)  SpO2: 98 % (03/15/20 0738) Vital Signs (24h Range):  Temp:  [97.6 °F (36.4 °C)-98.1 °F (36.7 °C)] 97.7 °F (36.5 °C)  Pulse:  [63-89] 65  Resp:  [18] 18  SpO2:  [97 %-99 %] 98 %  BP: (151-178)/(71-87) 166/87     Weight: 67.6 kg (149 lb 0.5 oz)  Body mass index is 19.66 kg/m².    Intake/Output - Last 3 Shifts       03/13 0700 - 03/14 0659 03/14 0700 - 03/15 0659 03/15 0700 - 03/16 0659    P.O. 200 600     I.V. (mL/kg) 1000 (14.8) 911.7 (13.5)     IV Piggyback 150      Total Intake(mL/kg) 1350 (20) 1511.7 (22.4)     Urine (mL/kg/hr) 440 (0.3) 640 (0.4) 300 (2)    Drains 80      Total Output 520 640 300    Net +830 +871.7 -300                 Physical Exam   Constitutional: He is oriented to person, place, and time. He appears well-developed and well-nourished.   HENT:   Head: Normocephalic and atraumatic.   Neck: Normal range of motion.   Cardiovascular: Normal rate, regular rhythm and normal heart sounds.   Pulmonary/Chest: Effort normal and breath sounds normal.   Abdominal: Soft. Bowel sounds are normal. He exhibits no distension. There is no tenderness.   Genitourinary: Penis normal.   Neurological: He is alert and oriented to person, place, and time.   Skin: Skin is warm. Capillary refill takes less than 2 seconds.   Psychiatric: He has a normal mood and affect. His behavior is normal. Judgment and thought content normal.   Nursing note reviewed.      Significant Labs:  CBC:   Recent Labs   Lab 03/15/20  0552   WBC 5.94   RBC 2.38*   HGB 6.5*   HCT 22.7*      MCV 95   MCH 27.3   MCHC 28.6*     BMP:   Recent Labs   Lab 03/15/20  0552   GLU 84      K 4.0   *   CO2 18*   BUN 26*   CREATININE 1.4   CALCIUM 8.5*   MG 2.0     CMP:   Recent Labs   Lab 03/15/20  0552   GLU 84    CALCIUM 8.5*   ALBUMIN 2.9*   PROT 6.4      K 4.0   CO2 18*   *   BUN 26*   CREATININE 1.4   ALKPHOS 142*   ALT 7*   AST 11   BILITOT 0.2       Significant Diagnostics:  Abdominal x-ray     Stefan Alvarado MD 3/15/2020 Routine      Narrative     EXAMINATION:  XR ABDOMEN FLAT AND ERECT    CLINICAL HISTORY:  Continued small follow-up on small bowel obstruction.  Patient has been advanced to a full liquid diet;    COMPARISON:  03/14/2020    FINDINGS:  Some moderate upright views the abdomen demonstrate mild to moderate small bowel dilatation with numerous air-fluid levels detected.  Gas noted throughout the nondistended colon a small amount of residual oral contrast within the colon and diverticulosis noted.  Findings suggestive of a small bowel obstruction.  No significant interval change since 03/14/2020. Left ureteral stent is in place.  Vascular calcifications noted.  Surgical clips overlie the spine, pelvis and hips bilaterally.No suspicious bony abnormality.    PRESSURE:    Nonobstructive bowel gas pattern.      Impression       FINDINGS SUGGESTIVE OF PARTIAL SMALL BOWEL OBSTRUCTION.  NO SIGNIFICANT INTERVAL CHANGE SINCE 03/14/2020.         Assessment/Plan:     * SBO (small bowel obstruction)  71yo M with multiple previous abdominal operations with SBO    Clinically improved.  However x-ray show dilated loops of small bowel suggestive a small-bowel obstruction.  Make NPO at midnight and obtain a Gastrografin small-bowel follow-through tomorrow is radiology is readily available.    CKD (chronic kidney disease) stage 4, GFR 15-29 ml/min  Management per primary team    Anemia  Management per primary team, consider transfusion    Essential hypertension  Management per primary team        Matt Mcgrath MD  General Surgery  [facility] -

## 2020-03-15 NOTE — SUBJECTIVE & OBJECTIVE
Interval History: Hgb 6.5 today, will transfuse 1 unit PRBC. Repeat imaging today, consistent with partial small bowel obstruction. General surgery recommending small bpwel follow through in am. NPO after MN.       Review of Systems   Constitutional: Positive for appetite change (decreased). Negative for activity change, chills, diaphoresis, fatigue, fever and unexpected weight change.   HENT: Negative for congestion, ear discharge, facial swelling, rhinorrhea, sinus pressure, sneezing, sore throat and trouble swallowing.    Eyes: Negative for discharge, redness and visual disturbance.   Respiratory: Negative for apnea, cough, choking, chest tightness, shortness of breath, wheezing and stridor.    Cardiovascular: Negative for chest pain, palpitations and leg swelling.   Gastrointestinal: Positive for abdominal pain and nausea. Negative for abdominal distention, anal bleeding, blood in stool, constipation, diarrhea and vomiting.   Endocrine: Negative for cold intolerance and heat intolerance.   Genitourinary: Negative for difficulty urinating, discharge, dysuria, frequency and hematuria.   Musculoskeletal: Negative for arthralgias, back pain, gait problem, joint swelling, myalgias, neck pain and neck stiffness.   Skin: Negative for color change, pallor and rash.   Neurological: Negative for dizziness, tremors, seizures, syncope, facial asymmetry, speech difficulty, weakness, light-headedness, numbness and headaches.   Psychiatric/Behavioral: Negative for agitation, behavioral problems, confusion, hallucinations, sleep disturbance and suicidal ideas. The patient is not nervous/anxious.    All other systems reviewed and are negative.    Objective:     Vital Signs (Most Recent):  Temp: 97.9 °F (36.6 °C) (03/15/20 1354)  Pulse: (!) 59 (03/15/20 1354)  Resp: 17 (03/15/20 1354)  BP: (!) 143/64 (03/15/20 1354)  SpO2: 96 % (03/15/20 1354) Vital Signs (24h Range):  Temp:  [97.6 °F (36.4 °C)-98.1 °F (36.7 °C)] 97.9 °F (36.6  °C)  Pulse:  [59-81] 59  Resp:  [17-18] 17  SpO2:  [96 %-99 %] 96 %  BP: (120-178)/(59-87) 143/64     Weight: 67.6 kg (149 lb 0.5 oz)  Body mass index is 19.66 kg/m².    Intake/Output Summary (Last 24 hours) at 3/15/2020 1531  Last data filed at 3/15/2020 1445  Gross per 24 hour   Intake 2667.92 ml   Output 1140 ml   Net 1527.92 ml      Physical Exam   Constitutional: He is oriented to person, place, and time. He appears well-developed and well-nourished. No distress.   HENT:   Head: Normocephalic and atraumatic.   Mouth/Throat: No oropharyngeal exudate.   Eyes: Pupils are equal, round, and reactive to light. Conjunctivae are normal. Right eye exhibits no discharge. Left eye exhibits no discharge.   Neck: Normal range of motion. Neck supple. No JVD present.   Cardiovascular: Normal rate, regular rhythm, normal heart sounds and intact distal pulses. Exam reveals no gallop and no friction rub.   No murmur heard.  Pulmonary/Chest: Effort normal and breath sounds normal. No stridor. No respiratory distress. He has no wheezes. He has no rales. He exhibits no tenderness.   Abdominal: Soft. Bowel sounds are normal. He exhibits no distension and no mass. There is no tenderness. There is no rebound and no guarding. No hernia.   Musculoskeletal: He exhibits no edema, tenderness or deformity.   Neurological: He is alert and oriented to person, place, and time.   Skin: Skin is warm and dry. Capillary refill takes less than 2 seconds. No rash noted. He is not diaphoretic. No erythema.   Psychiatric: He has a normal mood and affect. His behavior is normal.   Nursing note and vitals reviewed.      Significant Labs: All pertinent labs within the past 24 hours have been reviewed.    Significant Imaging:   Imaging Results          X-Ray Chest AP Portable (Final result)  Result time 03/12/20 09:22:40    Final result by Saji Issa MD (03/12/20 09:22:40)                 Impression:      As above.      Electronically signed by: Saji  Luis Manuel  Date:    03/12/2020  Time:    09:22             Narrative:    EXAMINATION:  XR CHEST AP PORTABLE    CLINICAL HISTORY:  . Presence of other specified devices    TECHNIQUE:  Single frontal portable view of the chest was performed.    COMPARISON:  02/02/2020    FINDINGS:  Support devices:    Esophagogastric tube in satisfactory position overlying the body of the stomach.    Chronic interstitial coarsening.  Linear reticular interstitial opacification reduced in severity compared to 02/02/2020.  No pneumothorax.  Suggestion of tiny right pleural effusion unchanged.  Surgical clips right axilla and left cervical region.    The cardiac silhouette is normal in size. The hilar and mediastinal contours are unremarkable.    Bones are intact.                               CT Abdomen Pelvis  Without Contrast (Final result)  Result time 03/12/20 08:01:28    Final result by Aurelio Curtis MD (03/12/20 08:01:28)                 Impression:      Prominent small bowel loops are seen throughout the abdomen with transition suspected within the upper pelvis likely at the site of a bowel anastomosis with decompressed ileum distally.  Small bowel loops are thickened at the level near the obstruction without evidence of pneumatosis.  Findings concerning for bowel obstruction.    Small hiatal hernia containing contrast and mild thickening at the GE junction likely reflects reflux disease.  Thickening of the rugal folds throughout the stomach likely reflects gastritis. Recommend endoscopy follow-up.    All CT scans at this facility use dose modulation, iterative reconstruction and/or weight based dosing when appropriate to reduce radiation dose to as low as reasonably achievable.      Electronically signed by: Aurelio Curtis MD  Date:    03/12/2020  Time:    08:01             Narrative:    EXAMINATION:  CT ABDOMEN PELVIS WITHOUT CONTRAST    CLINICAL HISTORY:  Abdominal pain, unspecified;    TECHNIQUE:  Routine 5 mm non-contrast  images of the abdomen and pelvis were done.  Sagittal and coronal reformats were also submitted for interpretation.    COMPARISON:  None    FINDINGS:  No consolidation.  Chronic interstitial thickening suspected at the right lung base.  Acute interstitial pneumonia not excluded.  No pleural effusion or pneumothorax identified heart size is normal.  Severe coronary disease.    The liver is normal in size and attenuation with no focal hepatic abnormality.  Moderate gallbladder distension.  No stones are identified.  No significant intrahepatic ductal dilatation.  Common bile duct is dilated measuring 11 mm without focal obstruction or stone.    The spleen, pancreas, and adrenal glands are unremarkable.  Benign splenic granuloma are noted.    Kidneys are small in size.  Nonobstructing stone lower pole of the right kidney.  Horseshoe configuration is noted.  Nonobstructing stone lower pole right kidney.  Mild left-sided hydronephrosis double-J ureteral stent is noted in satisfactory position.  Bladder demonstrates mild nonspecific bladder wall thickening which can be seen with cystitis..    Small hiatal hernia containing contrast and mild thickening at the GE junction likely reflects reflux disease.  Thickening of the rugal folds throughout the stomach likely reflects gastritis.  Recommend endoscopy follow-up.  Duodenal diverticulum is seen along the 3rd portion the duodenum.  Prominent small bowel loops are seen throughout the abdomen with transition suspected within the upper pelvis likely at the site of a bowel anastomosis with decompressed ileum distally.  Findings concerning for bowel obstruction.  Large bowel demonstrates fatty infiltration of the wall of the colon likely reflecting chronic inflammatory process.  Mild constipation.  Appendix is not seen..  Moderate diverticulosis seen throughout the descending and sigmoid colon.  No definite evidence for acute diverticulitis.  Trace ascites.  No free air.    There  is no evidence of lymph node enlargement in the abdomen or pelvis.  Shotty nodes are seen within the mesentery and retroperitoneum.    The abdominal aorta is small caliber with severe atherosclerotic calcifications.  Subclavian femoral bypass and fem-fem bypass grafts are noted.  Extensive clips are seen in the bilateral groin.    Moderate degenerative changes throughout the lumbar spine.  Chronic right rib fracture deformity involving the 7th rib laterally.  Chronic compression deformities at L3 and L1.                               X-Ray Abdomen Flat And Erect (Final result)  Result time 03/11/20 22:41:16    Final result by Aurelio Briceno MD (03/11/20 22:41:16)                 Impression:      Dilated loops of small bowel with multiple air-fluid levels consistent with a small-bowel obstruction.  No definite evidence of free air.      Electronically signed by: Aurelio Briceno MD  Date:    03/11/2020  Time:    22:41             Narrative:    EXAMINATION:  XR ABDOMEN FLAT AND ERECT    CLINICAL HISTORY:  Generalized abdominal pain    TECHNIQUE:  Flat and erect AP views of the abdomen were performed.    COMPARISON:  03/27/2019    FINDINGS:  Left ureteral stent.  Multiple air-fluid levels with distended small bowel loops consistent with a small bowel obstruction.                               RADIOLOGY REPORT (Final result)  Result time 03/13/20 17:14:06    Final result by Unknown User (03/13/20 17:14:06)

## 2020-03-15 NOTE — ASSESSMENT & PLAN NOTE
69yo M with multiple previous abdominal operations with SBO    Clinically improved.  However x-ray show dilated loops of small bowel suggestive a small-bowel obstruction.  Make NPO at midnight and obtain a Gastrografin small-bowel follow-through tomorrow is radiology is readily available.

## 2020-03-15 NOTE — SUBJECTIVE & OBJECTIVE
Interval History:     3/13/20: patient reports mild abdominal pain. No BM yet. Renal function stable with creatinine at 2.7.     3/14/20: patient is feeling great today and reports having had BM last night. NG tube has been removed.     3/15/20: patient is able to tolerated soft food. No complaints at present. Creatinine has declined to 1.4.           Review of patient's allergies indicates:   Allergen Reactions    Morphine Itching     Current Facility-Administered Medications   Medication Frequency    0.9%  NaCl infusion (for blood administration) Q24H PRN    0.9%  NaCl infusion Continuous    albuterol-ipratropium 2.5 mg-0.5 mg/3 mL nebulizer solution 3 mL Q8H PRN    amLODIPine tablet 10 mg Daily    aspirin EC tablet 81 mg Daily    atorvastatin tablet 40 mg Daily    carvediloL tablet 25 mg BID WM    dextrose 10% (D10W) Bolus PRN    dextrose 10% (D10W) Bolus PRN    famotidine IVPB 20 mg Daily    ferrous gluconate tablet 324 mg BID WM    folic acid-vit B6-vit B12 2.5-25-2 mg tablet 1 tablet Daily    glucagon (human recombinant) injection 1 mg PRN    glucose chewable tablet 16 g PRN    glucose chewable tablet 24 g PRN    hydrALAZINE injection 10 mg Q6H PRN    hydrALAZINE tablet 25 mg Q12H    isosorbide mononitrate 24 hr tablet 120 mg Daily    nitroGLYCERIN SL tablet 0.4 mg Q5 Min PRN    ondansetron injection 4 mg Q6H PRN    promethazine (PHENERGAN) 6.25 mg in dextrose 5 % 50 mL IVPB Q6H PRN    sertraline tablet 50 mg Daily    sodium chloride 0.9% flush 10 mL PRN       Objective:     Vital Signs (Most Recent):  Temp: 97.7 °F (36.5 °C) (03/15/20 0738)  Pulse: 65 (03/15/20 0738)  Resp: 18 (03/15/20 0738)  BP: (!) 166/87 (03/15/20 0738)  SpO2: 98 % (03/15/20 0738)  O2 Device (Oxygen Therapy): room air (03/14/20 1928) Vital Signs (24h Range):  Temp:  [97.6 °F (36.4 °C)-98.1 °F (36.7 °C)] 97.7 °F (36.5 °C)  Pulse:  [63-89] 65  Resp:  [18] 18  SpO2:  [97 %-99 %] 98 %  BP: (151-178)/(71-87) 166/87      Weight: 67.6 kg (149 lb 0.5 oz) (03/13/20 2339)  Body mass index is 19.66 kg/m².  Body surface area is 1.87 meters squared.    I/O last 3 completed shifts:  In: 1511.7 [P.O.:600; I.V.:911.7]  Out: 640 [Urine:640]    Physical Exam   Constitutional: He is oriented to person, place, and time. He appears well-developed. He is cooperative.   HENT:   Head: Normocephalic.   Nose: No rhinorrhea.   Mouth/Throat: Mucous membranes are normal. No oropharyngeal exudate.   Eyes: Pupils are equal, round, and reactive to light.   Neck: No thyroid mass present.   Cardiovascular: Normal rate, regular rhythm, S1 normal, S2 normal and intact distal pulses.   Pulmonary/Chest: Effort normal. No respiratory distress. He has no wheezes.   Abdominal: Soft. Bowel sounds are normal. He exhibits no distension. No hernia.   Musculoskeletal:   Right BKA. Left foot amputation.    Lymphadenopathy:     He has no cervical adenopathy.   Neurological: He is alert and oriented to person, place, and time.   Skin: Skin is warm and dry.   Psychiatric: He has a normal mood and affect.       Significant Labs:  Lab Results   Component Value Date    CREATININE 1.4 03/15/2020    BUN 26 (H) 03/15/2020     03/15/2020    K 4.0 03/15/2020     (H) 03/15/2020    CO2 18 (L) 03/15/2020     Lab Results   Component Value Date    .0 (H) 02/26/2020    CALCIUM 8.5 (L) 03/15/2020    PHOS 5.4 (H) 03/13/2020       Lab Results   Component Value Date    ALBUMIN 2.9 (L) 03/15/2020     Lab Results   Component Value Date    WBC 5.94 03/15/2020    HGB 6.5 (L) 03/15/2020    HCT 22.7 (L) 03/15/2020    MCV 95 03/15/2020     03/15/2020       Recent Labs   Lab 03/15/20  0552   MG 2.0         Significant Imaging:  Imaging Results          X-Ray Chest AP Portable (Final result)  Result time 03/12/20 09:22:40    Final result by Saji Issa MD (03/12/20 09:22:40)                 Impression:      As above.      Electronically signed by: Saji  Luis Manuel  Date:    03/12/2020  Time:    09:22             Narrative:    EXAMINATION:  XR CHEST AP PORTABLE    CLINICAL HISTORY:  . Presence of other specified devices    TECHNIQUE:  Single frontal portable view of the chest was performed.    COMPARISON:  02/02/2020    FINDINGS:  Support devices:    Esophagogastric tube in satisfactory position overlying the body of the stomach.    Chronic interstitial coarsening.  Linear reticular interstitial opacification reduced in severity compared to 02/02/2020.  No pneumothorax.  Suggestion of tiny right pleural effusion unchanged.  Surgical clips right axilla and left cervical region.    The cardiac silhouette is normal in size. The hilar and mediastinal contours are unremarkable.    Bones are intact.                               CT Abdomen Pelvis  Without Contrast (Final result)  Result time 03/12/20 08:01:28    Final result by Aurelio Curtis MD (03/12/20 08:01:28)                 Impression:      Prominent small bowel loops are seen throughout the abdomen with transition suspected within the upper pelvis likely at the site of a bowel anastomosis with decompressed ileum distally.  Small bowel loops are thickened at the level near the obstruction without evidence of pneumatosis.  Findings concerning for bowel obstruction.    Small hiatal hernia containing contrast and mild thickening at the GE junction likely reflects reflux disease.  Thickening of the rugal folds throughout the stomach likely reflects gastritis. Recommend endoscopy follow-up.    All CT scans at this facility use dose modulation, iterative reconstruction and/or weight based dosing when appropriate to reduce radiation dose to as low as reasonably achievable.      Electronically signed by: Aurelio Curtis MD  Date:    03/12/2020  Time:    08:01             Narrative:    EXAMINATION:  CT ABDOMEN PELVIS WITHOUT CONTRAST    CLINICAL HISTORY:  Abdominal pain, unspecified;    TECHNIQUE:  Routine 5 mm non-contrast  images of the abdomen and pelvis were done.  Sagittal and coronal reformats were also submitted for interpretation.    COMPARISON:  None    FINDINGS:  No consolidation.  Chronic interstitial thickening suspected at the right lung base.  Acute interstitial pneumonia not excluded.  No pleural effusion or pneumothorax identified heart size is normal.  Severe coronary disease.    The liver is normal in size and attenuation with no focal hepatic abnormality.  Moderate gallbladder distension.  No stones are identified.  No significant intrahepatic ductal dilatation.  Common bile duct is dilated measuring 11 mm without focal obstruction or stone.    The spleen, pancreas, and adrenal glands are unremarkable.  Benign splenic granuloma are noted.    Kidneys are small in size.  Nonobstructing stone lower pole of the right kidney.  Horseshoe configuration is noted.  Nonobstructing stone lower pole right kidney.  Mild left-sided hydronephrosis double-J ureteral stent is noted in satisfactory position.  Bladder demonstrates mild nonspecific bladder wall thickening which can be seen with cystitis..    Small hiatal hernia containing contrast and mild thickening at the GE junction likely reflects reflux disease.  Thickening of the rugal folds throughout the stomach likely reflects gastritis.  Recommend endoscopy follow-up.  Duodenal diverticulum is seen along the 3rd portion the duodenum.  Prominent small bowel loops are seen throughout the abdomen with transition suspected within the upper pelvis likely at the site of a bowel anastomosis with decompressed ileum distally.  Findings concerning for bowel obstruction.  Large bowel demonstrates fatty infiltration of the wall of the colon likely reflecting chronic inflammatory process.  Mild constipation.  Appendix is not seen..  Moderate diverticulosis seen throughout the descending and sigmoid colon.  No definite evidence for acute diverticulitis.  Trace ascites.  No free air.    There  is no evidence of lymph node enlargement in the abdomen or pelvis.  Shotty nodes are seen within the mesentery and retroperitoneum.    The abdominal aorta is small caliber with severe atherosclerotic calcifications.  Subclavian femoral bypass and fem-fem bypass grafts are noted.  Extensive clips are seen in the bilateral groin.    Moderate degenerative changes throughout the lumbar spine.  Chronic right rib fracture deformity involving the 7th rib laterally.  Chronic compression deformities at L3 and L1.                               X-Ray Abdomen Flat And Erect (Final result)  Result time 03/11/20 22:41:16    Final result by Aurelio Briceno MD (03/11/20 22:41:16)                 Impression:      Dilated loops of small bowel with multiple air-fluid levels consistent with a small-bowel obstruction.  No definite evidence of free air.      Electronically signed by: Aurelio Briceno MD  Date:    03/11/2020  Time:    22:41             Narrative:    EXAMINATION:  XR ABDOMEN FLAT AND ERECT    CLINICAL HISTORY:  Generalized abdominal pain    TECHNIQUE:  Flat and erect AP views of the abdomen were performed.    COMPARISON:  03/27/2019    FINDINGS:  Left ureteral stent.  Multiple air-fluid levels with distended small bowel loops consistent with a small bowel obstruction.                               RADIOLOGY REPORT (Final result)  Result time 03/13/20 17:14:06    Final result by Unknown User (03/13/20 17:14:06)

## 2020-03-15 NOTE — PLAN OF CARE
NS @ 100mL/hr. Heart monitor 8560. Utilizes w/c for mobility. I unit of blood transfused, tolerated well. No adverse reactions noted. Full liquid diet, tolerates well. Voices no c/o's @ this time. Call light in reach.

## 2020-03-15 NOTE — ASSESSMENT & PLAN NOTE
NG tube to LWS   NPO   General surgery following   IVF   OOB ambulate   3/14/20  The patient reports improvement in symptoms overnight, no further abd distension noted. The patient reports having a large BM overnight. + bowel sounds. NG tube D/Cd and diet advanced to clear liquid.   3/15/20 Repeat imaging today, consistent with partial small bowel obstruction. General surgery recommending small bpwel follow through in am. NPO after MN.

## 2020-03-15 NOTE — PLAN OF CARE
Rested comfortably throughout the night, no complaints of pain, nausea or discomfort. Denies any bowel movement this shift, tolerating full liquids. Safety precautions continued, chart check complete, will continue to monitor.

## 2020-03-15 NOTE — PROGRESS NOTES
[facility] -   Nephrology  Progress Note    Patient Name: Kodi Ribeiro  MRN: [id number]  Admission Date: 3/11/2020  Hospital Length of Stay: 3 days  Attending Provider: Markell Magaña MD   Primary Care Physician: Norma Nuñez MD  Principal Problem:SBO (small bowel obstruction)    Subjective:     HPI: Kodi Ribeiro is a  70-year-old  man  with history of hypertension , chronic kidney disease stage 4.  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 No BM yet. Renal function stable with creatinine at 2.7.     3/14/20: patient is feeling great today and reports having had BM last night. NG tube has been removed.     3/15/20: patient is able to tolerated soft food. No complaints at present. Creatinine has declined to 1.4.           Review of patient's allergies indicates:   Allergen Reactions    Morphine Itching     Current Facility-Administered Medications   Medication Frequency    0.9%  NaCl infusion (for blood administration) Q24H PRN    0.9%  NaCl infusion Continuous    albuterol-ipratropium 2.5 mg-0.5 mg/3 mL nebulizer solution 3 mL Q8H PRN    amLODIPine tablet 10 mg Daily    aspirin EC tablet 81 mg Daily    atorvastatin tablet 40 mg Daily    carvediloL tablet 25 mg BID WM    dextrose 10% (D10W) Bolus PRN    dextrose 10% (D10W) Bolus PRN    famotidine IVPB 20 mg Daily    ferrous gluconate tablet 324 mg BID WM    folic acid-vit B6-vit B12 2.5-25-2 mg tablet 1 tablet Daily    glucagon  (human recombinant) injection 1 mg PRN    glucose chewable tablet 16 g PRN    glucose chewable tablet 24 g PRN    hydrALAZINE injection 10 mg Q6H PRN    hydrALAZINE tablet 25 mg Q12H    isosorbide mononitrate 24 hr tablet 120 mg Daily    nitroGLYCERIN SL tablet 0.4 mg Q5 Min PRN    ondansetron injection 4 mg Q6H PRN    promethazine (PHENERGAN) 6.25 mg in dextrose 5 % 50 mL IVPB Q6H PRN    sertraline tablet 50 mg Daily    sodium chloride 0.9% flush 10 mL PRN       Objective:     Vital Signs (Most Recent):  Temp: 97.7 °F (36.5 °C) (03/15/20 0738)  Pulse: 65 (03/15/20 0738)  Resp: 18 (03/15/20 0738)  BP: (!) 166/87 (03/15/20 0738)  SpO2: 98 % (03/15/20 0738)  O2 Device (Oxygen Therapy): room air (03/14/20 1928) Vital Signs (24h Range):  Temp:  [97.6 °F (36.4 °C)-98.1 °F (36.7 °C)] 97.7 °F (36.5 °C)  Pulse:  [63-89] 65  Resp:  [18] 18  SpO2:  [97 %-99 %] 98 %  BP: (151-178)/(71-87) 166/87     Weight: 67.6 kg (149 lb 0.5 oz) (03/13/20 2339)  Body mass index is 19.66 kg/m².  Body surface area is 1.87 meters squared.    I/O last 3 completed shifts:  In: 1511.7 [P.O.:600; I.V.:911.7]  Out: 640 [Urine:640]    Physical Exam   Constitutional: He is oriented to person, place, and time. He appears well-developed. He is cooperative.   HENT:   Head: Normocephalic.   Nose: No rhinorrhea.   Mouth/Throat: Mucous membranes are normal. No oropharyngeal exudate.   Eyes: Pupils are equal, round, and reactive to light.   Neck: No thyroid mass present.   Cardiovascular: Normal rate, regular rhythm, S1 normal, S2 normal and intact distal pulses.   Pulmonary/Chest: Effort normal. No respiratory distress. He has no wheezes.   Abdominal: Soft. Bowel sounds are normal. He exhibits no distension. No hernia.   Musculoskeletal:   Right BKA. Left foot amputation.    Lymphadenopathy:     He has no cervical adenopathy.   Neurological: He is alert and oriented to person, place, and time.   Skin: Skin is warm and dry.   Psychiatric: He has a  normal mood and affect.       Significant Labs:  Lab Results   Component Value Date    CREATININE 1.4 03/15/2020    BUN 26 (H) 03/15/2020     03/15/2020    K 4.0 03/15/2020     (H) 03/15/2020    CO2 18 (L) 03/15/2020     Lab Results   Component Value Date    .0 (H) 02/26/2020    CALCIUM 8.5 (L) 03/15/2020    PHOS 5.4 (H) 03/13/2020       Lab Results   Component Value Date    ALBUMIN 2.9 (L) 03/15/2020     Lab Results   Component Value Date    WBC 5.94 03/15/2020    HGB 6.5 (L) 03/15/2020    HCT 22.7 (L) 03/15/2020    MCV 95 03/15/2020     03/15/2020       Recent Labs   Lab 03/15/20  0552   MG 2.0         Significant Imaging:  Imaging Results          X-Ray Chest AP Portable (Final result)  Result time 03/12/20 09:22:40    Final result by Saji Issa MD (03/12/20 09:22:40)                 Impression:      As above.      Electronically signed by: Saji Issa  Date:    03/12/2020  Time:    09:22             Narrative:    EXAMINATION:  XR CHEST AP PORTABLE    CLINICAL HISTORY:  . Presence of other specified devices    TECHNIQUE:  Single frontal portable view of the chest was performed.    COMPARISON:  02/02/2020    FINDINGS:  Support devices:    Esophagogastric tube in satisfactory position overlying the body of the stomach.    Chronic interstitial coarsening.  Linear reticular interstitial opacification reduced in severity compared to 02/02/2020.  No pneumothorax.  Suggestion of tiny right pleural effusion unchanged.  Surgical clips right axilla and left cervical region.    The cardiac silhouette is normal in size. The hilar and mediastinal contours are unremarkable.    Bones are intact.                               CT Abdomen Pelvis  Without Contrast (Final result)  Result time 03/12/20 08:01:28    Final result by Aurelio Curtis MD (03/12/20 08:01:28)                 Impression:      Prominent small bowel loops are seen throughout the abdomen with transition suspected within the upper  pelvis likely at the site of a bowel anastomosis with decompressed ileum distally.  Small bowel loops are thickened at the level near the obstruction without evidence of pneumatosis.  Findings concerning for bowel obstruction.    Small hiatal hernia containing contrast and mild thickening at the GE junction likely reflects reflux disease.  Thickening of the rugal folds throughout the stomach likely reflects gastritis. Recommend endoscopy follow-up.    All CT scans at this facility use dose modulation, iterative reconstruction and/or weight based dosing when appropriate to reduce radiation dose to as low as reasonably achievable.      Electronically signed by: Aurelio Curtis MD  Date:    03/12/2020  Time:    08:01             Narrative:    EXAMINATION:  CT ABDOMEN PELVIS WITHOUT CONTRAST    CLINICAL HISTORY:  Abdominal pain, unspecified;    TECHNIQUE:  Routine 5 mm non-contrast images of the abdomen and pelvis were done.  Sagittal and coronal reformats were also submitted for interpretation.    COMPARISON:  None    FINDINGS:  No consolidation.  Chronic interstitial thickening suspected at the right lung base.  Acute interstitial pneumonia not excluded.  No pleural effusion or pneumothorax identified heart size is normal.  Severe coronary disease.    The liver is normal in size and attenuation with no focal hepatic abnormality.  Moderate gallbladder distension.  No stones are identified.  No significant intrahepatic ductal dilatation.  Common bile duct is dilated measuring 11 mm without focal obstruction or stone.    The spleen, pancreas, and adrenal glands are unremarkable.  Benign splenic granuloma are noted.    Kidneys are small in size.  Nonobstructing stone lower pole of the right kidney.  Horseshoe configuration is noted.  Nonobstructing stone lower pole right kidney.  Mild left-sided hydronephrosis double-J ureteral stent is noted in satisfactory position.  Bladder demonstrates mild nonspecific bladder wall  thickening which can be seen with cystitis..    Small hiatal hernia containing contrast and mild thickening at the GE junction likely reflects reflux disease.  Thickening of the rugal folds throughout the stomach likely reflects gastritis.  Recommend endoscopy follow-up.  Duodenal diverticulum is seen along the 3rd portion the duodenum.  Prominent small bowel loops are seen throughout the abdomen with transition suspected within the upper pelvis likely at the site of a bowel anastomosis with decompressed ileum distally.  Findings concerning for bowel obstruction.  Large bowel demonstrates fatty infiltration of the wall of the colon likely reflecting chronic inflammatory process.  Mild constipation.  Appendix is not seen..  Moderate diverticulosis seen throughout the descending and sigmoid colon.  No definite evidence for acute diverticulitis.  Trace ascites.  No free air.    There is no evidence of lymph node enlargement in the abdomen or pelvis.  Shotty nodes are seen within the mesentery and retroperitoneum.    The abdominal aorta is small caliber with severe atherosclerotic calcifications.  Subclavian femoral bypass and fem-fem bypass grafts are noted.  Extensive clips are seen in the bilateral groin.    Moderate degenerative changes throughout the lumbar spine.  Chronic right rib fracture deformity involving the 7th rib laterally.  Chronic compression deformities at L3 and L1.                               X-Ray Abdomen Flat And Erect (Final result)  Result time 03/11/20 22:41:16    Final result by Aurelio Briceno MD (03/11/20 22:41:16)                 Impression:      Dilated loops of small bowel with multiple air-fluid levels consistent with a small-bowel obstruction.  No definite evidence of free air.      Electronically signed by: Aurelio Briceno MD  Date:    03/11/2020  Time:    22:41             Narrative:    EXAMINATION:  XR ABDOMEN FLAT AND ERECT    CLINICAL HISTORY:  Generalized abdominal  pain    TECHNIQUE:  Flat and erect AP views of the abdomen were performed.    COMPARISON:  03/27/2019    FINDINGS:  Left ureteral stent.  Multiple air-fluid levels with distended small bowel loops consistent with a small bowel obstruction.                               RADIOLOGY REPORT (Final result)  Result time 03/13/20 17:14:06    Final result by Unknown User (03/13/20 17:14:06)                                    Assessment/Plan:     CKD (chronic kidney disease) stage 4, GFR 15-29 ml/min  1. CKD stage 4 :  Patient baseline serum creatinine fluctuates between 2 and 3 mg/dL, CT scan of the abdomen shows mild hydronephrosis on the left side, patient has history of ureteric stricture and stent placement on that side, also has nonobstructing stones bilaterally, monitor renal function closely, avoid ACE inhibitors/ARB/NSAIDs at this time, continue gentle hydration, creatinine has now declined to 1.4.    2.  Hypertension - has been fluctuating. Make adjustments as needed.    3.  Small bowel obstruction - management per General surgery    4.  Anemia - multifactorial, monitor hemoglobin, PRBC transfusion when indicated.     5.  Electrolytes: Borderline hyperphosphatemia. Monitor.             Thank you for your consult. I will follow-up with patient. Please contact us if you have any additional questions.    Thad Padilla MD  Nephrology  [facility] [place]

## 2020-03-16 LAB
ALBUMIN SERPL BCP-MCNC: 2.9 G/DL (ref 3.5–5.2)
ALP SERPL-CCNC: 154 U/L (ref 55–135)
ALT SERPL W/O P-5'-P-CCNC: 11 U/L (ref 10–44)
ANION GAP SERPL CALC-SCNC: 8 MMOL/L (ref 8–16)
AST SERPL-CCNC: 15 U/L (ref 10–40)
BASOPHILS # BLD AUTO: 0.02 K/UL (ref 0–0.2)
BASOPHILS NFR BLD: 0.4 % (ref 0–1.9)
BILIRUB SERPL-MCNC: 0.3 MG/DL (ref 0.1–1)
BUN SERPL-MCNC: 17 MG/DL (ref 8–23)
CALCIUM SERPL-MCNC: 8.4 MG/DL (ref 8.7–10.5)
CHLORIDE SERPL-SCNC: 114 MMOL/L (ref 95–110)
CO2 SERPL-SCNC: 16 MMOL/L (ref 23–29)
CREAT SERPL-MCNC: 1.3 MG/DL (ref 0.5–1.4)
DIFFERENTIAL METHOD: ABNORMAL
EOSINOPHIL # BLD AUTO: 0.3 K/UL (ref 0–0.5)
EOSINOPHIL NFR BLD: 4.7 % (ref 0–8)
ERYTHROCYTE [DISTWIDTH] IN BLOOD BY AUTOMATED COUNT: 15.9 % (ref 11.5–14.5)
EST. GFR  (AFRICAN AMERICAN): >60 ML/MIN/1.73 M^2
EST. GFR  (NON AFRICAN AMERICAN): 55 ML/MIN/1.73 M^2
GLUCOSE SERPL-MCNC: 80 MG/DL (ref 70–110)
HCT VFR BLD AUTO: 25.3 % (ref 40–54)
HGB BLD-MCNC: 7.5 G/DL (ref 14–18)
IMM GRANULOCYTES # BLD AUTO: 0.02 K/UL (ref 0–0.04)
IMM GRANULOCYTES NFR BLD AUTO: 0.4 % (ref 0–0.5)
LYMPHOCYTES # BLD AUTO: 1.4 K/UL (ref 1–4.8)
LYMPHOCYTES NFR BLD: 25.6 % (ref 18–48)
MCH RBC QN AUTO: 27.5 PG (ref 27–31)
MCHC RBC AUTO-ENTMCNC: 29.6 G/DL (ref 32–36)
MCV RBC AUTO: 93 FL (ref 82–98)
MONOCYTES # BLD AUTO: 0.5 K/UL (ref 0.3–1)
MONOCYTES NFR BLD: 8.9 % (ref 4–15)
NEUTROPHILS # BLD AUTO: 3.2 K/UL (ref 1.8–7.7)
NEUTROPHILS NFR BLD: 60 % (ref 38–73)
NRBC BLD-RTO: 0 /100 WBC
PHOSPHATE SERPL-MCNC: 2.5 MG/DL (ref 2.7–4.5)
PLATELET # BLD AUTO: 213 K/UL (ref 150–350)
PMV BLD AUTO: 9.7 FL (ref 9.2–12.9)
POTASSIUM SERPL-SCNC: 4 MMOL/L (ref 3.5–5.1)
PROT SERPL-MCNC: 6.3 G/DL (ref 6–8.4)
RBC # BLD AUTO: 2.73 M/UL (ref 4.6–6.2)
SODIUM SERPL-SCNC: 138 MMOL/L (ref 136–145)
WBC # BLD AUTO: 5.31 K/UL (ref 3.9–12.7)

## 2020-03-16 PROCEDURE — 99232 PR SUBSEQUENT HOSPITAL CARE,LEVL II: ICD-10-PCS | Mod: HCNC,,, | Performed by: COLON & RECTAL SURGERY

## 2020-03-16 PROCEDURE — 25500020 PHARM REV CODE 255: Mod: HCNC | Performed by: INTERNAL MEDICINE

## 2020-03-16 PROCEDURE — 25000003 PHARM REV CODE 250: Mod: HCNC | Performed by: NURSE PRACTITIONER

## 2020-03-16 PROCEDURE — 84100 ASSAY OF PHOSPHORUS: CPT | Mod: HCNC

## 2020-03-16 PROCEDURE — 25000003 PHARM REV CODE 250: Mod: HCNC | Performed by: FAMILY MEDICINE

## 2020-03-16 PROCEDURE — 11000001 HC ACUTE MED/SURG PRIVATE ROOM: Mod: HCNC

## 2020-03-16 PROCEDURE — 99232 SBSQ HOSP IP/OBS MODERATE 35: CPT | Mod: HCNC,,, | Performed by: COLON & RECTAL SURGERY

## 2020-03-16 PROCEDURE — 63600175 PHARM REV CODE 636 W HCPCS: Mod: HCNC | Performed by: INTERNAL MEDICINE

## 2020-03-16 PROCEDURE — 99233 SBSQ HOSP IP/OBS HIGH 50: CPT | Mod: HCNC,,, | Performed by: INTERNAL MEDICINE

## 2020-03-16 PROCEDURE — 36415 COLL VENOUS BLD VENIPUNCTURE: CPT | Mod: HCNC

## 2020-03-16 PROCEDURE — 85025 COMPLETE CBC W/AUTO DIFF WBC: CPT | Mod: HCNC

## 2020-03-16 PROCEDURE — S0028 INJECTION, FAMOTIDINE, 20 MG: HCPCS | Mod: HCNC | Performed by: FAMILY MEDICINE

## 2020-03-16 PROCEDURE — 99233 PR SUBSEQUENT HOSPITAL CARE,LEVL III: ICD-10-PCS | Mod: HCNC,,, | Performed by: INTERNAL MEDICINE

## 2020-03-16 PROCEDURE — 80053 COMPREHEN METABOLIC PANEL: CPT | Mod: HCNC

## 2020-03-16 RX ADMIN — SODIUM CHLORIDE: 0.9 INJECTION, SOLUTION INTRAVENOUS at 04:03

## 2020-03-16 RX ADMIN — AMLODIPINE BESYLATE 10 MG: 10 TABLET ORAL at 11:03

## 2020-03-16 RX ADMIN — ISOSORBIDE MONONITRATE 120 MG: 60 TABLET, EXTENDED RELEASE ORAL at 11:03

## 2020-03-16 RX ADMIN — HYDRALAZINE HYDROCHLORIDE 25 MG: 25 TABLET, FILM COATED ORAL at 09:03

## 2020-03-16 RX ADMIN — SERTRALINE HYDROCHLORIDE 50 MG: 50 TABLET ORAL at 11:03

## 2020-03-16 RX ADMIN — DIATRIZOATE MEGLUMINE AND DIATRIZOATE SODIUM 240 ML: 660; 100 LIQUID ORAL; RECTAL at 11:03

## 2020-03-16 RX ADMIN — CARVEDILOL 25 MG: 12.5 TABLET, FILM COATED ORAL at 11:03

## 2020-03-16 RX ADMIN — ASPIRIN 81 MG: 81 TABLET, COATED ORAL at 11:03

## 2020-03-16 RX ADMIN — CARVEDILOL 25 MG: 12.5 TABLET, FILM COATED ORAL at 04:03

## 2020-03-16 RX ADMIN — ATORVASTATIN CALCIUM 40 MG: 40 TABLET, FILM COATED ORAL at 11:03

## 2020-03-16 RX ADMIN — Medication 1 TABLET: at 11:03

## 2020-03-16 RX ADMIN — HYDRALAZINE HYDROCHLORIDE 25 MG: 25 TABLET, FILM COATED ORAL at 11:03

## 2020-03-16 RX ADMIN — FAMOTIDINE 20 MG: 20 INJECTION, SOLUTION INTRAVENOUS at 11:03

## 2020-03-16 NOTE — PROGRESS NOTES
[facility] -   Nephrology  Progress Note    Patient Name: Kodi Ribeiro  MRN: [id number]  Admission Date: 3/11/2020  Hospital Length of Stay: 4 days  Attending Provider: Markell Magaña MD   Primary Care Physician: Norma Nuñez MD  Principal Problem:SBO (small bowel obstruction)    Subjective:     HPI: Kodi Ribeiro is a  70-year-old  man  with history of hypertension , chronic kidney disease stage 4.  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 No BM yet. Renal function stable with creatinine at 2.7.     3/14/20: patient is feeling great today and reports having had BM last night. NG tube has been removed.     3/15/20: patient is able to tolerated soft food. No complaints at present. Creatinine has declined to 1.4.     3/16/20: patient just returned from small-bowel follow-through study. He has experienced at lot of diarrhea over past 1-2 days.             Review of patient's allergies indicates:   Allergen Reactions    Morphine Itching     Current Facility-Administered Medications   Medication Frequency    0.9%  NaCl infusion (for blood administration) Q24H PRN    0.9%  NaCl infusion Continuous    albuterol-ipratropium 2.5 mg-0.5 mg/3 mL nebulizer solution 3 mL Q8H PRN    amLODIPine tablet 10 mg Daily    aspirin EC tablet 81 mg Daily    atorvastatin tablet 40 mg Daily    carvediloL tablet 25 mg BID WM    dextrose 10% (D10W) Bolus PRN    dextrose 10% (D10W) Bolus PRN    famotidine IVPB  20 mg Daily    ferrous gluconate tablet 324 mg BID WM    folic acid-vit B6-vit B12 2.5-25-2 mg tablet 1 tablet Daily    glucagon (human recombinant) injection 1 mg PRN    glucose chewable tablet 16 g PRN    glucose chewable tablet 24 g PRN    hydrALAZINE injection 10 mg Q6H PRN    hydrALAZINE tablet 25 mg Q12H    isosorbide mononitrate 24 hr tablet 120 mg Daily    nitroGLYCERIN SL tablet 0.4 mg Q5 Min PRN    ondansetron injection 4 mg Q6H PRN    promethazine (PHENERGAN) 6.25 mg in dextrose 5 % 50 mL IVPB Q6H PRN    sertraline tablet 50 mg Daily    sodium chloride 0.9% flush 10 mL PRN       Objective:     Vital Signs (Most Recent):  Temp: 97.5 °F (36.4 °C) (03/16/20 0714)  Pulse: 64 (03/16/20 0714)  Resp: 17 (03/16/20 0714)  BP: (!) 182/80 (03/16/20 0714)  SpO2: 97 % (03/16/20 0714)  O2 Device (Oxygen Therapy): room air (03/15/20 1501) Vital Signs (24h Range):  Temp:  [97.5 °F (36.4 °C)-98.2 °F (36.8 °C)] 97.5 °F (36.4 °C)  Pulse:  [59-67] 64  Resp:  [15-18] 17  SpO2:  [96 %-98 %] 97 %  BP: (120-182)/(59-87) 182/80     Weight: 67.6 kg (149 lb 0.5 oz) (03/13/20 2339)  Body mass index is 19.66 kg/m².  Body surface area is 1.87 meters squared.    I/O last 3 completed shifts:  In: 6086.3 [P.O.:2160; I.V.:3370; Blood:556.3]  Out: 1865 [Urine:1865]    Physical Exam   Constitutional: He is oriented to person, place, and time. He appears well-developed. He is cooperative.   HENT:   Head: Normocephalic.   Nose: No rhinorrhea.   Mouth/Throat: Mucous membranes are normal. No oropharyngeal exudate.   Eyes: Pupils are equal, round, and reactive to light.   Neck: No thyroid mass present.   Cardiovascular: Normal rate, regular rhythm, S1 normal, S2 normal and intact distal pulses.   Pulmonary/Chest: Effort normal. No respiratory distress. He has no wheezes.   Abdominal: Soft. Bowel sounds are normal. He exhibits no distension. No hernia.   Musculoskeletal:   Right BKA. Left foot amputation.    Lymphadenopathy:     He  has no cervical adenopathy.   Neurological: He is alert and oriented to person, place, and time.   Skin: Skin is warm and dry.   Psychiatric: He has a normal mood and affect.       Significant Labs:  Lab Results   Component Value Date    CREATININE 1.3 03/16/2020    BUN 17 03/16/2020     03/16/2020    K 4.0 03/16/2020     (H) 03/16/2020    CO2 16 (L) 03/16/2020     Lab Results   Component Value Date    .0 (H) 02/26/2020    CALCIUM 8.4 (L) 03/16/2020    PHOS 2.5 (L) 03/16/2020       Lab Results   Component Value Date    ALBUMIN 2.9 (L) 03/16/2020     Lab Results   Component Value Date    WBC 5.31 03/16/2020    HGB 7.5 (L) 03/16/2020    HCT 25.3 (L) 03/16/2020    MCV 93 03/16/2020     03/16/2020       Recent Labs   Lab 03/15/20  0552   MG 2.0         Significant Imaging:  Imaging Results          X-Ray Chest AP Portable (Final result)  Result time 03/12/20 09:22:40    Final result by Saji Issa MD (03/12/20 09:22:40)                 Impression:      As above.      Electronically signed by: Saji Issa  Date:    03/12/2020  Time:    09:22             Narrative:    EXAMINATION:  XR CHEST AP PORTABLE    CLINICAL HISTORY:  . Presence of other specified devices    TECHNIQUE:  Single frontal portable view of the chest was performed.    COMPARISON:  02/02/2020    FINDINGS:  Support devices:    Esophagogastric tube in satisfactory position overlying the body of the stomach.    Chronic interstitial coarsening.  Linear reticular interstitial opacification reduced in severity compared to 02/02/2020.  No pneumothorax.  Suggestion of tiny right pleural effusion unchanged.  Surgical clips right axilla and left cervical region.    The cardiac silhouette is normal in size. The hilar and mediastinal contours are unremarkable.    Bones are intact.                               CT Abdomen Pelvis  Without Contrast (Final result)  Result time 03/12/20 08:01:28    Final result by Aurelio Curtis MD (03/12/20  08:01:28)                 Impression:      Prominent small bowel loops are seen throughout the abdomen with transition suspected within the upper pelvis likely at the site of a bowel anastomosis with decompressed ileum distally.  Small bowel loops are thickened at the level near the obstruction without evidence of pneumatosis.  Findings concerning for bowel obstruction.    Small hiatal hernia containing contrast and mild thickening at the GE junction likely reflects reflux disease.  Thickening of the rugal folds throughout the stomach likely reflects gastritis. Recommend endoscopy follow-up.    All CT scans at this facility use dose modulation, iterative reconstruction and/or weight based dosing when appropriate to reduce radiation dose to as low as reasonably achievable.      Electronically signed by: Aurelio Curtis MD  Date:    03/12/2020  Time:    08:01             Narrative:    EXAMINATION:  CT ABDOMEN PELVIS WITHOUT CONTRAST    CLINICAL HISTORY:  Abdominal pain, unspecified;    TECHNIQUE:  Routine 5 mm non-contrast images of the abdomen and pelvis were done.  Sagittal and coronal reformats were also submitted for interpretation.    COMPARISON:  None    FINDINGS:  No consolidation.  Chronic interstitial thickening suspected at the right lung base.  Acute interstitial pneumonia not excluded.  No pleural effusion or pneumothorax identified heart size is normal.  Severe coronary disease.    The liver is normal in size and attenuation with no focal hepatic abnormality.  Moderate gallbladder distension.  No stones are identified.  No significant intrahepatic ductal dilatation.  Common bile duct is dilated measuring 11 mm without focal obstruction or stone.    The spleen, pancreas, and adrenal glands are unremarkable.  Benign splenic granuloma are noted.    Kidneys are small in size.  Nonobstructing stone lower pole of the right kidney.  Horseshoe configuration is noted.  Nonobstructing stone lower pole right kidney.   Mild left-sided hydronephrosis double-J ureteral stent is noted in satisfactory position.  Bladder demonstrates mild nonspecific bladder wall thickening which can be seen with cystitis..    Small hiatal hernia containing contrast and mild thickening at the GE junction likely reflects reflux disease.  Thickening of the rugal folds throughout the stomach likely reflects gastritis.  Recommend endoscopy follow-up.  Duodenal diverticulum is seen along the 3rd portion the duodenum.  Prominent small bowel loops are seen throughout the abdomen with transition suspected within the upper pelvis likely at the site of a bowel anastomosis with decompressed ileum distally.  Findings concerning for bowel obstruction.  Large bowel demonstrates fatty infiltration of the wall of the colon likely reflecting chronic inflammatory process.  Mild constipation.  Appendix is not seen..  Moderate diverticulosis seen throughout the descending and sigmoid colon.  No definite evidence for acute diverticulitis.  Trace ascites.  No free air.    There is no evidence of lymph node enlargement in the abdomen or pelvis.  Shotty nodes are seen within the mesentery and retroperitoneum.    The abdominal aorta is small caliber with severe atherosclerotic calcifications.  Subclavian femoral bypass and fem-fem bypass grafts are noted.  Extensive clips are seen in the bilateral groin.    Moderate degenerative changes throughout the lumbar spine.  Chronic right rib fracture deformity involving the 7th rib laterally.  Chronic compression deformities at L3 and L1.                               X-Ray Abdomen Flat And Erect (Final result)  Result time 03/11/20 22:41:16    Final result by Aurelio Briceno MD (03/11/20 22:41:16)                 Impression:      Dilated loops of small bowel with multiple air-fluid levels consistent with a small-bowel obstruction.  No definite evidence of free air.      Electronically signed by: Aurelio Briceno  MD  Date:    03/11/2020  Time:    22:41             Narrative:    EXAMINATION:  XR ABDOMEN FLAT AND ERECT    CLINICAL HISTORY:  Generalized abdominal pain    TECHNIQUE:  Flat and erect AP views of the abdomen were performed.    COMPARISON:  03/27/2019    FINDINGS:  Left ureteral stent.  Multiple air-fluid levels with distended small bowel loops consistent with a small bowel obstruction.                               RADIOLOGY REPORT (Final result)  Result time 03/13/20 17:14:06    Final result by Unknown User (03/13/20 17:14:06)                                    Assessment/Plan:     CKD (chronic kidney disease) stage 4, GFR 15-29 ml/min  1. CKD stage 4 :  Patient baseline serum creatinine fluctuates between 2 and 3 mg/dL, CT scan of the abdomen shows mild hydronephrosis on the left side, patient has history of ureteric stricture and stent placement on that side, also has nonobstructing stones bilaterally, monitor renal function closely, avoid ACE inhibitors/ARB/NSAIDs at this time, continue gentle hydration, creatinine has now declined to 1.3.    2.  Hypertension - has been fluctuating. Make adjustments as needed.    3.  Small bowel obstruction - management per General surgery    4.  Anemia - multifactorial, monitor hemoglobin, s/p PRBC on 3/15/20.     5.  Electrolytes: Borderline hyperphosphatemia has resolved.    6. Non-gap Acidosis: likely due to recent diarrhea, monitor for now.             Thank you for your consult. I will follow-up with patient. Please contact us if you have any additional questions.    Thad Padilla MD  Nephrology  [facility]

## 2020-03-16 NOTE — ASSESSMENT & PLAN NOTE
1. CKD stage 4 :  Patient baseline serum creatinine fluctuates between 2 and 3 mg/dL, CT scan of the abdomen shows mild hydronephrosis on the left side, patient has history of ureteric stricture and stent placement on that side, also has nonobstructing stones bilaterally, monitor renal function closely, avoid ACE inhibitors/ARB/NSAIDs at this time, continue gentle hydration, creatinine has now declined to 1.3.    2.  Hypertension - has been fluctuating. Make adjustments as needed.    3.  Small bowel obstruction - management per General surgery    4.  Anemia - multifactorial, monitor hemoglobin, s/p PRBC on 3/15/20.     5.  Electrolytes: Borderline hyperphosphatemia has resolved.    6. Non-gap Acidosis: likely due to recent diarrhea, monitor for now.

## 2020-03-16 NOTE — PROGRESS NOTES
[facility] Hospital Medicine  Progress Note    Patient Name: Kodi Ribeiro  MRN: [id number]  Patient Class: IP- Inpatient   Admission Date: 3/11/2020  Length of Stay: 4 days  Attending Physician: Markell Magaña MD  Primary Care Provider: Norma Nuñez MD        Subjective:     Principal Problem:SBO (small bowel obstruction)        HPI:    70 y.o. male patient with a PMHx of anemia, GERD, HTN, HLD, MI  ,previous recurrent abdominal surgery for AAA repair who presented with abdominal pain/vomiting that worsened on the day of admit . Pain was described as sharp, 6/10 in intensity .  Since admission, CT scan of the abdomen showed     Per STAT radiology, pt's CT results:   1. Reticulonodular densities in the R lower lung may represent an infectious/inflammatory process.   2. Dilated fluid and gas-filled small bowel loops with transition point in LLQ, compatible with SBO.  3. Trace ascites.   4. Mild L hydroureteronephrosis with L ureteral stent noted. No definite obstructing stone identified.     Gen surgery was notified in the ED. He had a previous episode of SBO in 2017 which resolved without surgery .His wife is by bedside. He has NG tube in situ.     Overview/Hospital Course:  Mr Ribeiro is a 70 year old male admitted with small bowl obstruction, general surgery following. He continues to be NPO with NGT intact to low suction. Vital signs and labs stable. 3/14/20 The patient reports improvement in symptoms overnight, no further abd distension noted. The patient reports having a large BM overnight. + bowel sounds. NG tube D/Cd and diet advanced to clear liquid. 3/15/20 Hgb 6.5 today, will transfuse 1 unit PRBC. Repeat imaging today, consistent with partial small bowel obstruction. General surgery recommending small bowel follow through in am. NPO after MN. 3/16/20 The patient underwent a small bowel follow through today which was negative. Will resume diet. General surgery following. Possible  discharge home tomorrow if he continues to improve.     Interval History: The patient underwent a small bowel follow through today which was negative. Will resume diet. General surgery following. Possible discharge home tomorrow if he continues to improve.       Review of Systems   Constitutional: Positive for appetite change (decreased). Negative for activity change, chills, diaphoresis, fatigue, fever and unexpected weight change.   HENT: Negative for congestion, ear discharge, facial swelling, rhinorrhea, sinus pressure, sneezing, sore throat and trouble swallowing.    Eyes: Negative for discharge, redness and visual disturbance.   Respiratory: Negative for apnea, cough, choking, chest tightness, shortness of breath, wheezing and stridor.    Cardiovascular: Negative for chest pain, palpitations and leg swelling.   Gastrointestinal: Positive for abdominal pain and nausea. Negative for abdominal distention, anal bleeding, blood in stool, constipation, diarrhea and vomiting.   Endocrine: Negative for cold intolerance and heat intolerance.   Genitourinary: Negative for difficulty urinating, discharge, dysuria, frequency and hematuria.   Musculoskeletal: Negative for arthralgias, back pain, gait problem, joint swelling, myalgias, neck pain and neck stiffness.   Skin: Negative for color change, pallor and rash.   Neurological: Negative for dizziness, tremors, seizures, syncope, facial asymmetry, speech difficulty, weakness, light-headedness, numbness and headaches.   Psychiatric/Behavioral: Negative for agitation, behavioral problems, confusion, hallucinations, sleep disturbance and suicidal ideas. The patient is not nervous/anxious.    All other systems reviewed and are negative.    Objective:     Vital Signs (Most Recent):  Temp: 97.3 °F (36.3 °C) (03/16/20 1307)  Pulse: 69 (03/16/20 1307)  Resp: 18 (03/16/20 1307)  BP: 127/62 (03/16/20 1307)  SpO2: 100 % (03/16/20 1307) Vital Signs (24h Range):  Temp:  [97.3 °F  (36.3 °C)-98.2 °F (36.8 °C)] 97.3 °F (36.3 °C)  Pulse:  [63-69] 69  Resp:  [15-18] 18  SpO2:  [97 %-100 %] 100 %  BP: (127-182)/(62-87) 127/62     Weight: 67.6 kg (149 lb 0.5 oz)  Body mass index is 19.66 kg/m².    Intake/Output Summary (Last 24 hours) at 3/16/2020 1535  Last data filed at 3/16/2020 0800  Gross per 24 hour   Intake 3418.33 ml   Output 725 ml   Net 2693.33 ml      Physical Exam   Constitutional: He is oriented to person, place, and time. He appears well-developed and well-nourished. No distress.   HENT:   Head: Normocephalic and atraumatic.   Mouth/Throat: No oropharyngeal exudate.   Eyes: Pupils are equal, round, and reactive to light. Conjunctivae are normal. Right eye exhibits no discharge. Left eye exhibits no discharge.   Neck: Normal range of motion. Neck supple. No JVD present.   Cardiovascular: Normal rate, regular rhythm, normal heart sounds and intact distal pulses. Exam reveals no gallop and no friction rub.   No murmur heard.  Pulmonary/Chest: Effort normal and breath sounds normal. No stridor. No respiratory distress. He has no wheezes. He has no rales. He exhibits no tenderness.   Abdominal: Soft. Bowel sounds are normal. He exhibits no distension and no mass. There is no tenderness. There is no rebound and no guarding. No hernia.   Musculoskeletal: He exhibits no edema, tenderness or deformity.   Neurological: He is alert and oriented to person, place, and time.   Skin: Skin is warm and dry. Capillary refill takes less than 2 seconds. No rash noted. He is not diaphoretic. No erythema.   Psychiatric: He has a normal mood and affect. His behavior is normal.   Nursing note and vitals reviewed.      Significant Labs: All pertinent labs within the past 24 hours have been reviewed.    Significant Imaging:   Imaging Results          X-Ray Chest AP Portable (Final result)  Result time 03/12/20 09:22:40    Final result by Saji Issa MD (03/12/20 09:22:40)                 Impression:       As above.      Electronically signed by: Saji Issa  Date:    03/12/2020  Time:    09:22             Narrative:    EXAMINATION:  XR CHEST AP PORTABLE    CLINICAL HISTORY:  . Presence of other specified devices    TECHNIQUE:  Single frontal portable view of the chest was performed.    COMPARISON:  02/02/2020    FINDINGS:  Support devices:    Esophagogastric tube in satisfactory position overlying the body of the stomach.    Chronic interstitial coarsening.  Linear reticular interstitial opacification reduced in severity compared to 02/02/2020.  No pneumothorax.  Suggestion of tiny right pleural effusion unchanged.  Surgical clips right axilla and left cervical region.    The cardiac silhouette is normal in size. The hilar and mediastinal contours are unremarkable.    Bones are intact.                               CT Abdomen Pelvis  Without Contrast (Final result)  Result time 03/12/20 08:01:28    Final result by Aurelio Curtis MD (03/12/20 08:01:28)                 Impression:      Prominent small bowel loops are seen throughout the abdomen with transition suspected within the upper pelvis likely at the site of a bowel anastomosis with decompressed ileum distally.  Small bowel loops are thickened at the level near the obstruction without evidence of pneumatosis.  Findings concerning for bowel obstruction.    Small hiatal hernia containing contrast and mild thickening at the GE junction likely reflects reflux disease.  Thickening of the rugal folds throughout the stomach likely reflects gastritis. Recommend endoscopy follow-up.    All CT scans at this facility use dose modulation, iterative reconstruction and/or weight based dosing when appropriate to reduce radiation dose to as low as reasonably achievable.      Electronically signed by: Aurelio Curtis MD  Date:    03/12/2020  Time:    08:01             Narrative:    EXAMINATION:  CT ABDOMEN PELVIS WITHOUT CONTRAST    CLINICAL HISTORY:  Abdominal pain,  unspecified;    TECHNIQUE:  Routine 5 mm non-contrast images of the abdomen and pelvis were done.  Sagittal and coronal reformats were also submitted for interpretation.    COMPARISON:  None    FINDINGS:  No consolidation.  Chronic interstitial thickening suspected at the right lung base.  Acute interstitial pneumonia not excluded.  No pleural effusion or pneumothorax identified heart size is normal.  Severe coronary disease.    The liver is normal in size and attenuation with no focal hepatic abnormality.  Moderate gallbladder distension.  No stones are identified.  No significant intrahepatic ductal dilatation.  Common bile duct is dilated measuring 11 mm without focal obstruction or stone.    The spleen, pancreas, and adrenal glands are unremarkable.  Benign splenic granuloma are noted.    Kidneys are small in size.  Nonobstructing stone lower pole of the right kidney.  Horseshoe configuration is noted.  Nonobstructing stone lower pole right kidney.  Mild left-sided hydronephrosis double-J ureteral stent is noted in satisfactory position.  Bladder demonstrates mild nonspecific bladder wall thickening which can be seen with cystitis..    Small hiatal hernia containing contrast and mild thickening at the GE junction likely reflects reflux disease.  Thickening of the rugal folds throughout the stomach likely reflects gastritis.  Recommend endoscopy follow-up.  Duodenal diverticulum is seen along the 3rd portion the duodenum.  Prominent small bowel loops are seen throughout the abdomen with transition suspected within the upper pelvis likely at the site of a bowel anastomosis with decompressed ileum distally.  Findings concerning for bowel obstruction.  Large bowel demonstrates fatty infiltration of the wall of the colon likely reflecting chronic inflammatory process.  Mild constipation.  Appendix is not seen..  Moderate diverticulosis seen throughout the descending and sigmoid colon.  No definite evidence for acute  diverticulitis.  Trace ascites.  No free air.    There is no evidence of lymph node enlargement in the abdomen or pelvis.  Shotty nodes are seen within the mesentery and retroperitoneum.    The abdominal aorta is small caliber with severe atherosclerotic calcifications.  Subclavian femoral bypass and fem-fem bypass grafts are noted.  Extensive clips are seen in the bilateral groin.    Moderate degenerative changes throughout the lumbar spine.  Chronic right rib fracture deformity involving the 7th rib laterally.  Chronic compression deformities at L3 and L1.                               X-Ray Abdomen Flat And Erect (Final result)  Result time 03/11/20 22:41:16    Final result by Aurelio Briceno MD (03/11/20 22:41:16)                 Impression:      Dilated loops of small bowel with multiple air-fluid levels consistent with a small-bowel obstruction.  No definite evidence of free air.      Electronically signed by: Aurelio Briceno MD  Date:    03/11/2020  Time:    22:41             Narrative:    EXAMINATION:  XR ABDOMEN FLAT AND ERECT    CLINICAL HISTORY:  Generalized abdominal pain    TECHNIQUE:  Flat and erect AP views of the abdomen were performed.    COMPARISON:  03/27/2019    FINDINGS:  Left ureteral stent.  Multiple air-fluid levels with distended small bowel loops consistent with a small bowel obstruction.                               RADIOLOGY REPORT (Final result)  Result time 03/13/20 17:14:06    Final result by Unknown User (03/13/20 17:14:06)                                      Assessment/Plan:      * SBO (small bowel obstruction)  NG tube to LWS   NPO   General surgery following   IVF   OOB ambulate   3/14/20  The patient reports improvement in symptoms overnight, no further abd distension noted. The patient reports having a large BM overnight. + bowel sounds. NG tube D/Cd and diet advanced to clear liquid.   3/15/20 Repeat imaging today, consistent with partial small bowel obstruction. General surgery  recommending small bpwel follow through in am. NPO after MN.   3/16/20 The patient underwent a small bowel follow through today which was negative. Will resume diet. General surgery following. Possible discharge home tomorrow if he continues to improve.         CKD (chronic kidney disease) stage 4, GFR 15-29 ml/min  Will avoid nephrotoxic meds , monitor serum creatinine     3/13  Creatinine 2.7  Renal function as baseline   Monitor       Anemia  Will monitor closely, transfuse as needed   3/15/20 Hgb 6.5 today, will transfuse 1 unit PRBC.   3/16/20 Hgb improved to 7.5 after transfusion     Essential hypertension  He is NPO , will use hydralazine PRN   3/15/20 Resume home meds       VTE Risk Mitigation (From admission, onward)         Ordered     IP VTE HIGH RISK PATIENT  Once      03/12/20 0025     Reason for no Mechanical VTE Prophylaxis  Once     Question:  Reasons:  Answer:  Physician Provided (leave comment)    03/12/20 0025     Place sequential compression device  Until discontinued      03/12/20 0025                      Brennen Weiner NP  Department of Hospital Medicine   [facility]

## 2020-03-16 NOTE — ASSESSMENT & PLAN NOTE
71yo M with multiple previous abdominal operations with SBO    - although he continues to tolerate a liquid diet and passed flatus, still has dilated loops of small bowel on x-ray  - will proceed with Gastrografin small-bowel follow-through today  - will re-evaluate diet post-test  - cont IVF for now  - ambulation encouraged

## 2020-03-16 NOTE — ASSESSMENT & PLAN NOTE
Will monitor closely, transfuse as needed   3/15/20 Hgb 6.5 today, will transfuse 1 unit PRBC.   3/16/20 Hgb improved to 7.5 after transfusion

## 2020-03-16 NOTE — PLAN OF CARE
NS @ 100mL/hr. Heart monitor 8560. Utilizes w/c for mobility. Loose Bmx2. Remains NPO @ this time. Voices no c/o's @ this time. Call light in reach. Remains free from injury/incident.

## 2020-03-16 NOTE — NURSING
Notified by monitor deepak Spear that pt had a 1.67 sec pause on the monitor. Pt is asymptomatic. Notified Dr Marshall, no new orders at this time.

## 2020-03-16 NOTE — SUBJECTIVE & OBJECTIVE
Interval History:  Continues to pass flatus.  No bowel movements.  Tolerating liquid diet well.  Denies nausea or vomiting.     Medications:  Continuous Infusions:   sodium chloride 0.9% 100 mL/hr at 03/15/20 2353     Scheduled Meds:   amLODIPine  10 mg Oral Daily    aspirin  81 mg Oral Daily    atorvastatin  40 mg Oral Daily    carvediloL  25 mg Oral BID WM    famotidine  20 mg Intravenous Daily    ferrous gluconate  324 mg Oral BID WM    folic acid-vit B6-vit B12 2.5-25-2 mg  1 tablet Oral Daily    hydrALAZINE  25 mg Oral Q12H    isosorbide mononitrate  120 mg Oral Daily    sertraline  50 mg Oral Daily     PRN Meds:sodium chloride, albuterol-ipratropium, Dextrose 10% Bolus, Dextrose 10% Bolus, glucagon (human recombinant), glucose, glucose, hydrALAZINE, nitroGLYCERIN, ondansetron, promethazine (PHENERGAN) IVPB, sodium chloride 0.9%     Review of patient's allergies indicates:   Allergen Reactions    Morphine Itching     Objective:     Vital Signs (Most Recent):  Temp: 97.5 °F (36.4 °C) (03/16/20 0714)  Pulse: 64 (03/16/20 0714)  Resp: 17 (03/16/20 0714)  BP: (!) 182/80 (03/16/20 0714)  SpO2: 97 % (03/16/20 0714) Vital Signs (24h Range):  Temp:  [97.5 °F (36.4 °C)-98.2 °F (36.8 °C)] 97.5 °F (36.4 °C)  Pulse:  [59-67] 64  Resp:  [15-18] 17  SpO2:  [96 %-98 %] 97 %  BP: (120-182)/(59-87) 182/80     Weight: 67.6 kg (149 lb 0.5 oz)  Body mass index is 19.66 kg/m².    Intake/Output - Last 3 Shifts       03/14 0700 - 03/15 0659 03/15 0700 - 03/16 0659 03/16 0700 - 03/17 0659    P.O. 600 1560     I.V. (mL/kg) 911.7 (13.5) 2458.3 (36.4)     Blood  556.3     IV Piggyback       Total Intake(mL/kg) 1511.7 (22.4) 4574.6 (67.7)     Urine (mL/kg/hr) 640 (0.4) 1225 (0.8)     Drains       Stool  0     Total Output 640 1225     Net +871.7 +3349.6            Urine Occurrence  1 x     Stool Occurrence  0 x           Physical Exam   Constitutional: He is oriented to person, place, and time. He appears well-developed.    HENT:   Head: Normocephalic and atraumatic.   Eyes: Conjunctivae and EOM are normal.   Neck: Normal range of motion. No thyromegaly present.   Cardiovascular: Normal rate and regular rhythm.   Pulmonary/Chest: Effort normal. No respiratory distress.   Abdominal:   Soft, nondistented, nontender no tympany; well-healed previous midline incision   Musculoskeletal: Normal range of motion. He exhibits no edema or tenderness.   Neurological: He is alert and oriented to person, place, and time.   Skin: Skin is warm and dry. Capillary refill takes less than 2 seconds. No rash noted.   Psychiatric: He has a normal mood and affect.       Significant Labs:  CBC:   Recent Labs   Lab 03/16/20  0430   WBC 5.31   RBC 2.73*   HGB 7.5*   HCT 25.3*      MCV 93   MCH 27.5   MCHC 29.6*     BMP:   Recent Labs   Lab 03/15/20  0552 03/16/20  0430   GLU 84 80    138   K 4.0 4.0   * 114*   CO2 18* 16*   BUN 26* 17   CREATININE 1.4 1.3   CALCIUM 8.5* 8.4*   MG 2.0  --      CMP:   Recent Labs   Lab 03/16/20  0430   GLU 80   CALCIUM 8.4*   ALBUMIN 2.9*   PROT 6.3      K 4.0   CO2 16*   *   BUN 17   CREATININE 1.3   ALKPHOS 154*   ALT 11   AST 15   BILITOT 0.3     LFTs:   Recent Labs   Lab 03/16/20  0430   ALT 11   AST 15   ALKPHOS 154*   BILITOT 0.3   PROT 6.3   ALBUMIN 2.9*

## 2020-03-16 NOTE — SUBJECTIVE & OBJECTIVE
Interval History:     3/13/20: patient reports mild abdominal pain. No BM yet. Renal function stable with creatinine at 2.7.     3/14/20: patient is feeling great today and reports having had BM last night. NG tube has been removed.     3/15/20: patient is able to tolerated soft food. No complaints at present. Creatinine has declined to 1.4.     3/16/20: patient just returned from small-bowel follow-through study. He has experienced at lot of diarrhea over past 1-2 days.             Review of patient's allergies indicates:   Allergen Reactions    Morphine Itching     Current Facility-Administered Medications   Medication Frequency    0.9%  NaCl infusion (for blood administration) Q24H PRN    0.9%  NaCl infusion Continuous    albuterol-ipratropium 2.5 mg-0.5 mg/3 mL nebulizer solution 3 mL Q8H PRN    amLODIPine tablet 10 mg Daily    aspirin EC tablet 81 mg Daily    atorvastatin tablet 40 mg Daily    carvediloL tablet 25 mg BID WM    dextrose 10% (D10W) Bolus PRN    dextrose 10% (D10W) Bolus PRN    famotidine IVPB 20 mg Daily    ferrous gluconate tablet 324 mg BID WM    folic acid-vit B6-vit B12 2.5-25-2 mg tablet 1 tablet Daily    glucagon (human recombinant) injection 1 mg PRN    glucose chewable tablet 16 g PRN    glucose chewable tablet 24 g PRN    hydrALAZINE injection 10 mg Q6H PRN    hydrALAZINE tablet 25 mg Q12H    isosorbide mononitrate 24 hr tablet 120 mg Daily    nitroGLYCERIN SL tablet 0.4 mg Q5 Min PRN    ondansetron injection 4 mg Q6H PRN    promethazine (PHENERGAN) 6.25 mg in dextrose 5 % 50 mL IVPB Q6H PRN    sertraline tablet 50 mg Daily    sodium chloride 0.9% flush 10 mL PRN       Objective:     Vital Signs (Most Recent):  Temp: 97.5 °F (36.4 °C) (03/16/20 0714)  Pulse: 64 (03/16/20 0714)  Resp: 17 (03/16/20 0714)  BP: (!) 182/80 (03/16/20 0714)  SpO2: 97 % (03/16/20 0714)  O2 Device (Oxygen Therapy): room air (03/15/20 1501) Vital Signs (24h Range):  Temp:  [97.5 °F (36.4  °C)-98.2 °F (36.8 °C)] 97.5 °F (36.4 °C)  Pulse:  [59-67] 64  Resp:  [15-18] 17  SpO2:  [96 %-98 %] 97 %  BP: (120-182)/(59-87) 182/80     Weight: 67.6 kg (149 lb 0.5 oz) (03/13/20 2339)  Body mass index is 19.66 kg/m².  Body surface area is 1.87 meters squared.    I/O last 3 completed shifts:  In: 6086.3 [P.O.:2160; I.V.:3370; Blood:556.3]  Out: 1865 [Urine:1865]    Physical Exam   Constitutional: He is oriented to person, place, and time. He appears well-developed. He is cooperative.   HENT:   Head: Normocephalic.   Nose: No rhinorrhea.   Mouth/Throat: Mucous membranes are normal. No oropharyngeal exudate.   Eyes: Pupils are equal, round, and reactive to light.   Neck: No thyroid mass present.   Cardiovascular: Normal rate, regular rhythm, S1 normal, S2 normal and intact distal pulses.   Pulmonary/Chest: Effort normal. No respiratory distress. He has no wheezes.   Abdominal: Soft. Bowel sounds are normal. He exhibits no distension. No hernia.   Musculoskeletal:   Right BKA. Left foot amputation.    Lymphadenopathy:     He has no cervical adenopathy.   Neurological: He is alert and oriented to person, place, and time.   Skin: Skin is warm and dry.   Psychiatric: He has a normal mood and affect.       Significant Labs:  Lab Results   Component Value Date    CREATININE 1.3 03/16/2020    BUN 17 03/16/2020     03/16/2020    K 4.0 03/16/2020     (H) 03/16/2020    CO2 16 (L) 03/16/2020     Lab Results   Component Value Date    .0 (H) 02/26/2020    CALCIUM 8.4 (L) 03/16/2020    PHOS 2.5 (L) 03/16/2020       Lab Results   Component Value Date    ALBUMIN 2.9 (L) 03/16/2020     Lab Results   Component Value Date    WBC 5.31 03/16/2020    HGB 7.5 (L) 03/16/2020    HCT 25.3 (L) 03/16/2020    MCV 93 03/16/2020     03/16/2020       Recent Labs   Lab 03/15/20  0552   MG 2.0         Significant Imaging:  Imaging Results          X-Ray Chest AP Portable (Final result)  Result time 03/12/20 09:22:40     Final result by Saji Issa MD (03/12/20 09:22:40)                 Impression:      As above.      Electronically signed by: Saji Issa  Date:    03/12/2020  Time:    09:22             Narrative:    EXAMINATION:  XR CHEST AP PORTABLE    CLINICAL HISTORY:  . Presence of other specified devices    TECHNIQUE:  Single frontal portable view of the chest was performed.    COMPARISON:  02/02/2020    FINDINGS:  Support devices:    Esophagogastric tube in satisfactory position overlying the body of the stomach.    Chronic interstitial coarsening.  Linear reticular interstitial opacification reduced in severity compared to 02/02/2020.  No pneumothorax.  Suggestion of tiny right pleural effusion unchanged.  Surgical clips right axilla and left cervical region.    The cardiac silhouette is normal in size. The hilar and mediastinal contours are unremarkable.    Bones are intact.                               CT Abdomen Pelvis  Without Contrast (Final result)  Result time 03/12/20 08:01:28    Final result by Aurelio Curtis MD (03/12/20 08:01:28)                 Impression:      Prominent small bowel loops are seen throughout the abdomen with transition suspected within the upper pelvis likely at the site of a bowel anastomosis with decompressed ileum distally.  Small bowel loops are thickened at the level near the obstruction without evidence of pneumatosis.  Findings concerning for bowel obstruction.    Small hiatal hernia containing contrast and mild thickening at the GE junction likely reflects reflux disease.  Thickening of the rugal folds throughout the stomach likely reflects gastritis. Recommend endoscopy follow-up.    All CT scans at this facility use dose modulation, iterative reconstruction and/or weight based dosing when appropriate to reduce radiation dose to as low as reasonably achievable.      Electronically signed by: Aurelio Curtis MD  Date:    03/12/2020  Time:    08:01             Narrative:     EXAMINATION:  CT ABDOMEN PELVIS WITHOUT CONTRAST    CLINICAL HISTORY:  Abdominal pain, unspecified;    TECHNIQUE:  Routine 5 mm non-contrast images of the abdomen and pelvis were done.  Sagittal and coronal reformats were also submitted for interpretation.    COMPARISON:  None    FINDINGS:  No consolidation.  Chronic interstitial thickening suspected at the right lung base.  Acute interstitial pneumonia not excluded.  No pleural effusion or pneumothorax identified heart size is normal.  Severe coronary disease.    The liver is normal in size and attenuation with no focal hepatic abnormality.  Moderate gallbladder distension.  No stones are identified.  No significant intrahepatic ductal dilatation.  Common bile duct is dilated measuring 11 mm without focal obstruction or stone.    The spleen, pancreas, and adrenal glands are unremarkable.  Benign splenic granuloma are noted.    Kidneys are small in size.  Nonobstructing stone lower pole of the right kidney.  Horseshoe configuration is noted.  Nonobstructing stone lower pole right kidney.  Mild left-sided hydronephrosis double-J ureteral stent is noted in satisfactory position.  Bladder demonstrates mild nonspecific bladder wall thickening which can be seen with cystitis..    Small hiatal hernia containing contrast and mild thickening at the GE junction likely reflects reflux disease.  Thickening of the rugal folds throughout the stomach likely reflects gastritis.  Recommend endoscopy follow-up.  Duodenal diverticulum is seen along the 3rd portion the duodenum.  Prominent small bowel loops are seen throughout the abdomen with transition suspected within the upper pelvis likely at the site of a bowel anastomosis with decompressed ileum distally.  Findings concerning for bowel obstruction.  Large bowel demonstrates fatty infiltration of the wall of the colon likely reflecting chronic inflammatory process.  Mild constipation.  Appendix is not seen..  Moderate  diverticulosis seen throughout the descending and sigmoid colon.  No definite evidence for acute diverticulitis.  Trace ascites.  No free air.    There is no evidence of lymph node enlargement in the abdomen or pelvis.  Shotty nodes are seen within the mesentery and retroperitoneum.    The abdominal aorta is small caliber with severe atherosclerotic calcifications.  Subclavian femoral bypass and fem-fem bypass grafts are noted.  Extensive clips are seen in the bilateral groin.    Moderate degenerative changes throughout the lumbar spine.  Chronic right rib fracture deformity involving the 7th rib laterally.  Chronic compression deformities at L3 and L1.                               X-Ray Abdomen Flat And Erect (Final result)  Result time 03/11/20 22:41:16    Final result by Aurelio Briceno MD (03/11/20 22:41:16)                 Impression:      Dilated loops of small bowel with multiple air-fluid levels consistent with a small-bowel obstruction.  No definite evidence of free air.      Electronically signed by: Aurelio Briceno MD  Date:    03/11/2020  Time:    22:41             Narrative:    EXAMINATION:  XR ABDOMEN FLAT AND ERECT    CLINICAL HISTORY:  Generalized abdominal pain    TECHNIQUE:  Flat and erect AP views of the abdomen were performed.    COMPARISON:  03/27/2019    FINDINGS:  Left ureteral stent.  Multiple air-fluid levels with distended small bowel loops consistent with a small bowel obstruction.                               RADIOLOGY REPORT (Final result)  Result time 03/13/20 17:14:06    Final result by Unknown User (03/13/20 17:14:06)

## 2020-03-16 NOTE — PROGRESS NOTES
[facility] -   General Surgery  Progress Note    Subjective:     History of Present Illness:  70-year-old male with a past medical history significant for anemia, GERD, HTN, HLD, CKD, CAD and previous AAA repair who presented to the [facility] emergency department with abdominal pain, nausea and vomiting x1 day.  Patient states the pain and nausea began while simultaneously, with pain being sharp and moderate in severity.  Patient states that his last bowel movement was 2 days ago.  Workup in emergency department was worrisome for a small-bowel obstruction. Patient was admitted to the medical service and surgery is consulted for evaluation.  Patient denies any current fever, chills, hematochezia or melena.  Of note, patient had a similar episode in 2017 that resolved with non operative management. Multiple previous abdominal operations, including AAA repair, AAA graft excision.     Post-Op Info:  * No surgery found *         Interval History:  Continues to pass flatus.  No bowel movements.  Tolerating liquid diet well.  Denies nausea or vomiting.     Medications:  Continuous Infusions:   sodium chloride 0.9% 100 mL/hr at 03/15/20 2353     Scheduled Meds:   amLODIPine  10 mg Oral Daily    aspirin  81 mg Oral Daily    atorvastatin  40 mg Oral Daily    carvediloL  25 mg Oral BID WM    famotidine  20 mg Intravenous Daily    ferrous gluconate  324 mg Oral BID WM    folic acid-vit B6-vit B12 2.5-25-2 mg  1 tablet Oral Daily    hydrALAZINE  25 mg Oral Q12H    isosorbide mononitrate  120 mg Oral Daily    sertraline  50 mg Oral Daily     PRN Meds:sodium chloride, albuterol-ipratropium, Dextrose 10% Bolus, Dextrose 10% Bolus, glucagon (human recombinant), glucose, glucose, hydrALAZINE, nitroGLYCERIN, ondansetron, promethazine (PHENERGAN) IVPB, sodium chloride 0.9%     Review of patient's allergies indicates:   Allergen Reactions    Morphine Itching     Objective:     Vital Signs (Most Recent):  Temp:  97.5 °F (36.4 °C) (03/16/20 0714)  Pulse: 64 (03/16/20 0714)  Resp: 17 (03/16/20 0714)  BP: (!) 182/80 (03/16/20 0714)  SpO2: 97 % (03/16/20 0714) Vital Signs (24h Range):  Temp:  [97.5 °F (36.4 °C)-98.2 °F (36.8 °C)] 97.5 °F (36.4 °C)  Pulse:  [59-67] 64  Resp:  [15-18] 17  SpO2:  [96 %-98 %] 97 %  BP: (120-182)/(59-87) 182/80     Weight: 67.6 kg (149 lb 0.5 oz)  Body mass index is 19.66 kg/m².    Intake/Output - Last 3 Shifts       03/14 0700 - 03/15 0659 03/15 0700 - 03/16 0659 03/16 0700 - 03/17 0659    P.O. 600 1560     I.V. (mL/kg) 911.7 (13.5) 2458.3 (36.4)     Blood  556.3     IV Piggyback       Total Intake(mL/kg) 1511.7 (22.4) 4574.6 (67.7)     Urine (mL/kg/hr) 640 (0.4) 1225 (0.8)     Drains       Stool  0     Total Output 640 1225     Net +871.7 +3349.6            Urine Occurrence  1 x     Stool Occurrence  0 x           Physical Exam   Constitutional: He is oriented to person, place, and time. He appears well-developed.   HENT:   Head: Normocephalic and atraumatic.   Eyes: Conjunctivae and EOM are normal.   Neck: Normal range of motion. No thyromegaly present.   Cardiovascular: Normal rate and regular rhythm.   Pulmonary/Chest: Effort normal. No respiratory distress.   Abdominal:   Soft, nondistented, nontender no tympany; well-healed previous midline incision   Musculoskeletal: Normal range of motion. He exhibits no edema or tenderness.   Neurological: He is alert and oriented to person, place, and time.   Skin: Skin is warm and dry. Capillary refill takes less than 2 seconds. No rash noted.   Psychiatric: He has a normal mood and affect.       Significant Labs:  CBC:   Recent Labs   Lab 03/16/20  0430   WBC 5.31   RBC 2.73*   HGB 7.5*   HCT 25.3*      MCV 93   MCH 27.5   MCHC 29.6*     BMP:   Recent Labs   Lab 03/15/20  0552 03/16/20  0430   GLU 84 80    138   K 4.0 4.0   * 114*   CO2 18* 16*   BUN 26* 17   CREATININE 1.4 1.3   CALCIUM 8.5* 8.4*   MG 2.0  --      CMP:   Recent Labs    Lab 03/16/20  0430   GLU 80   CALCIUM 8.4*   ALBUMIN 2.9*   PROT 6.3      K 4.0   CO2 16*   *   BUN 17   CREATININE 1.3   ALKPHOS 154*   ALT 11   AST 15   BILITOT 0.3     LFTs:   Recent Labs   Lab 03/16/20  0430   ALT 11   AST 15   ALKPHOS 154*   BILITOT 0.3   PROT 6.3   ALBUMIN 2.9*     Assessment/Plan:     * SBO (small bowel obstruction)  69yo M with multiple previous abdominal operations with SBO    - although he continues to tolerate a liquid diet and passed flatus, still has dilated loops of small bowel on x-ray  - will proceed with Gastrografin small-bowel follow-through today  - will re-evaluate diet post-test  - cont IVF for now  - ambulation encouraged    CKD (chronic kidney disease) stage 4, GFR 15-29 ml/min  Management per primary team    Anemia  Management per primary team, transfuse prn    Essential hypertension  Management per primary team        Leonardo Yang MD  General Surgery  [facility]

## 2020-03-16 NOTE — ASSESSMENT & PLAN NOTE
NG tube to LWS   NPO   General surgery following   IVF   OOB ambulate   3/14/20  The patient reports improvement in symptoms overnight, no further abd distension noted. The patient reports having a large BM overnight. + bowel sounds. NG tube D/Cd and diet advanced to clear liquid.   3/15/20 Repeat imaging today, consistent with partial small bowel obstruction. General surgery recommending small bpwel follow through in am. NPO after MN.   3/16/20 The patient underwent a small bowel follow through today which was negative. Will resume diet. General surgery following. Possible discharge home tomorrow if he continues to improve.

## 2020-03-17 VITALS
RESPIRATION RATE: 18 BRPM | OXYGEN SATURATION: 98 % | SYSTOLIC BLOOD PRESSURE: 179 MMHG | TEMPERATURE: 98 F | WEIGHT: 149.06 LBS | HEIGHT: 73 IN | HEART RATE: 61 BPM | BODY MASS INDEX: 19.75 KG/M2 | DIASTOLIC BLOOD PRESSURE: 81 MMHG

## 2020-03-17 PROBLEM — K56.609 SBO (SMALL BOWEL OBSTRUCTION): Status: RESOLVED | Noted: 2020-03-12 | Resolved: 2020-03-17

## 2020-03-17 LAB
ALBUMIN SERPL BCP-MCNC: 2.9 G/DL (ref 3.5–5.2)
ALP SERPL-CCNC: 156 U/L (ref 55–135)
ALT SERPL W/O P-5'-P-CCNC: 13 U/L (ref 10–44)
ANION GAP SERPL CALC-SCNC: 7 MMOL/L (ref 8–16)
AST SERPL-CCNC: 18 U/L (ref 10–40)
BASOPHILS # BLD AUTO: 0.03 K/UL (ref 0–0.2)
BASOPHILS NFR BLD: 0.6 % (ref 0–1.9)
BILIRUB SERPL-MCNC: 0.1 MG/DL (ref 0.1–1)
BUN SERPL-MCNC: 14 MG/DL (ref 8–23)
CALCIUM SERPL-MCNC: 8.4 MG/DL (ref 8.7–10.5)
CHLORIDE SERPL-SCNC: 113 MMOL/L (ref 95–110)
CO2 SERPL-SCNC: 17 MMOL/L (ref 23–29)
CREAT SERPL-MCNC: 1.3 MG/DL (ref 0.5–1.4)
DIFFERENTIAL METHOD: ABNORMAL
EOSINOPHIL # BLD AUTO: 0.3 K/UL (ref 0–0.5)
EOSINOPHIL NFR BLD: 6 % (ref 0–8)
ERYTHROCYTE [DISTWIDTH] IN BLOOD BY AUTOMATED COUNT: 15.7 % (ref 11.5–14.5)
EST. GFR  (AFRICAN AMERICAN): >60 ML/MIN/1.73 M^2
EST. GFR  (NON AFRICAN AMERICAN): 55 ML/MIN/1.73 M^2
GLUCOSE SERPL-MCNC: 85 MG/DL (ref 70–110)
HCT VFR BLD AUTO: 24.5 % (ref 40–54)
HGB BLD-MCNC: 7.3 G/DL (ref 14–18)
IMM GRANULOCYTES # BLD AUTO: 0.01 K/UL (ref 0–0.04)
IMM GRANULOCYTES NFR BLD AUTO: 0.2 % (ref 0–0.5)
LYMPHOCYTES # BLD AUTO: 1.2 K/UL (ref 1–4.8)
LYMPHOCYTES NFR BLD: 25.1 % (ref 18–48)
MCH RBC QN AUTO: 27.7 PG (ref 27–31)
MCHC RBC AUTO-ENTMCNC: 29.8 G/DL (ref 32–36)
MCV RBC AUTO: 93 FL (ref 82–98)
MONOCYTES # BLD AUTO: 0.5 K/UL (ref 0.3–1)
MONOCYTES NFR BLD: 10.5 % (ref 4–15)
NEUTROPHILS # BLD AUTO: 2.8 K/UL (ref 1.8–7.7)
NEUTROPHILS NFR BLD: 57.6 % (ref 38–73)
NRBC BLD-RTO: 0 /100 WBC
PHOSPHATE SERPL-MCNC: 2.7 MG/DL (ref 2.7–4.5)
PLATELET # BLD AUTO: 190 K/UL (ref 150–350)
PMV BLD AUTO: 9.7 FL (ref 9.2–12.9)
POTASSIUM SERPL-SCNC: 3.9 MMOL/L (ref 3.5–5.1)
PROT SERPL-MCNC: 6.3 G/DL (ref 6–8.4)
RBC # BLD AUTO: 2.64 M/UL (ref 4.6–6.2)
SODIUM SERPL-SCNC: 137 MMOL/L (ref 136–145)
WBC # BLD AUTO: 4.86 K/UL (ref 3.9–12.7)

## 2020-03-17 PROCEDURE — 99232 PR SUBSEQUENT HOSPITAL CARE,LEVL II: ICD-10-PCS | Mod: HCNC,,, | Performed by: COLON & RECTAL SURGERY

## 2020-03-17 PROCEDURE — 99232 SBSQ HOSP IP/OBS MODERATE 35: CPT | Mod: HCNC,,, | Performed by: COLON & RECTAL SURGERY

## 2020-03-17 PROCEDURE — 85025 COMPLETE CBC W/AUTO DIFF WBC: CPT | Mod: HCNC

## 2020-03-17 PROCEDURE — 25000003 PHARM REV CODE 250: Mod: HCNC | Performed by: FAMILY MEDICINE

## 2020-03-17 PROCEDURE — 84100 ASSAY OF PHOSPHORUS: CPT | Mod: HCNC

## 2020-03-17 PROCEDURE — 80053 COMPREHEN METABOLIC PANEL: CPT | Mod: HCNC

## 2020-03-17 PROCEDURE — 25000003 PHARM REV CODE 250: Mod: HCNC | Performed by: NURSE PRACTITIONER

## 2020-03-17 PROCEDURE — S0028 INJECTION, FAMOTIDINE, 20 MG: HCPCS | Mod: HCNC | Performed by: FAMILY MEDICINE

## 2020-03-17 PROCEDURE — 36415 COLL VENOUS BLD VENIPUNCTURE: CPT | Mod: HCNC

## 2020-03-17 RX ADMIN — ISOSORBIDE MONONITRATE 120 MG: 60 TABLET, EXTENDED RELEASE ORAL at 09:03

## 2020-03-17 RX ADMIN — SERTRALINE HYDROCHLORIDE 50 MG: 50 TABLET ORAL at 09:03

## 2020-03-17 RX ADMIN — CARVEDILOL 25 MG: 12.5 TABLET, FILM COATED ORAL at 09:03

## 2020-03-17 RX ADMIN — FAMOTIDINE 20 MG: 20 INJECTION, SOLUTION INTRAVENOUS at 09:03

## 2020-03-17 RX ADMIN — AMLODIPINE BESYLATE 10 MG: 10 TABLET ORAL at 09:03

## 2020-03-17 RX ADMIN — ATORVASTATIN CALCIUM 40 MG: 40 TABLET, FILM COATED ORAL at 09:03

## 2020-03-17 RX ADMIN — ASPIRIN 81 MG: 81 TABLET, COATED ORAL at 09:03

## 2020-03-17 RX ADMIN — Medication 1 TABLET: at 09:03

## 2020-03-17 RX ADMIN — HYDRALAZINE HYDROCHLORIDE 25 MG: 25 TABLET, FILM COATED ORAL at 09:03

## 2020-03-17 NOTE — DISCHARGE SUMMARY
[facility] Hospital Medicine  Discharge Summary      Patient Name: Kodi Ribeiro  MRN: [id number]  Admission Date: 3/11/2020  Hospital Length of Stay: 5 days  Discharge Date and Time:  03/17/2020 12:30 PM  Attending Physician: No att. providers found   Discharging Provider: Brennen Weiner NP  Primary Care Provider: Norma Nuñez MD      HPI:     70 y.o. male patient with a PMHx of anemia, GERD, HTN, HLD, MI  ,previous recurrent abdominal surgery for AAA repair who presented with abdominal pain/vomiting that worsened on the day of admit . Pain was described as sharp, 6/10 in intensity .  Since admission, CT scan of the abdomen showed     Per STAT radiology, pt's CT results:   1. Reticulonodular densities in the R lower lung may represent an infectious/inflammatory process.   2. Dilated fluid and gas-filled small bowel loops with transition point in LLQ, compatible with SBO.  3. Trace ascites.   4. Mild L hydroureteronephrosis with L ureteral stent noted. No definite obstructing stone identified.     Gen surgery was notified in the ED. He had a previous episode of SBO in 2017 which resolved without surgery .His wife is by bedside. He has NG tube in situ.     * No surgery found *      Hospital Course:   Mr Ribeiro is a 70 year old male admitted with small bowl obstruction, general surgery following. He continues to be NPO with NGT intact to low suction. Vital signs and labs stable. 3/14/20 The patient reports improvement in symptoms overnight, no further abd distension noted. The patient reports having a large BM overnight. + bowel sounds. NG tube D/Cd and diet advanced to clear liquid. 3/15/20 Hgb 6.5 today, will transfuse 1 unit PRBC. Repeat imaging today, consistent with partial small bowel obstruction. General surgery recommending small bowel follow through in am. NPO after MN. 3/16/20 The patient underwent a small bowel follow through today which was negative. Will resume diet. General surgery  following. Possible discharge home tomorrow if he continues to improve. 3/17/20 No acute issues overnight. The patients diet was advanced to regular and he tolerated well. The patient was seen and examined today and deemed stable for discharge. He will follow up with his PCP and with General surgery.      Consults:   Consults (From admission, onward)        Status Ordering Provider     Inpatient consult to General surgery  Once     Provider:  Leonardo Yang MD    Completed SABINO HERNANDEZ.     Inpatient consult to Hospitalist  Once     Provider:  Lavell Waldrop MD    Acknowledged SABINO HERNANDEZ.     Inpatient consult to Nephrology  Once     Provider:  Nilay Zambrano MD    Acknowledged LENIN HEIN          No new Assessment & Plan notes have been filed under this hospital service since the last note was generated.  Service: Hospital Medicine    Final Active Diagnoses:    Diagnosis Date Noted POA    CKD (chronic kidney disease) stage 4, GFR 15-29 ml/min [N18.4] 09/04/2014 Yes    Anemia [D64.9] 11/25/2013 Yes    Essential hypertension [I10] 06/18/2013 Yes     Chronic      Problems Resolved During this Admission:    Diagnosis Date Noted Date Resolved POA    PRINCIPAL PROBLEM:  SBO (small bowel obstruction) [K56.609] 03/12/2020 03/17/2020 Yes       Discharged Condition: stable    Disposition: Home or Self Care    Follow Up:  Follow-up Information     [facility].    Specialty:  Home Health Services  Why:  Home Health  Contact information:  [address], [address]  [place] [address]  [phone]             Norma Nuñez MD. Schedule an appointment as soon as possible for a visit in 3 days.    Specialty:  Family Medicine  Contact information:  [address] [address] [id number] [phone]             Josie Bravo PA-C. Schedule an appointment as soon as possible for a visit in 2 weeks.    Specialty:  General Surgery  Why:  After hospital follow up  Contact  information:  [address] [place] [address] [address] [place] [address]  [phone]                 Patient Instructions:   No discharge procedures on file.    Significant Diagnostic Studies:   Imaging Results          X-Ray Chest AP Portable (Final result)  Result time 03/12/20 09:22:40    Final result by Saji Issa MD (03/12/20 09:22:40)                 Impression:      As above.      Electronically signed by: Saji Issa  Date:    03/12/2020  Time:    09:22             Narrative:    EXAMINATION:  XR CHEST AP PORTABLE    CLINICAL HISTORY:  . Presence of other specified devices    TECHNIQUE:  Single frontal portable view of the chest was performed.    COMPARISON:  02/02/2020    FINDINGS:  Support devices:    Esophagogastric tube in satisfactory position overlying the body of the stomach.    Chronic interstitial coarsening.  Linear reticular interstitial opacification reduced in severity compared to 02/02/2020.  No pneumothorax.  Suggestion of tiny right pleural effusion unchanged.  Surgical clips right axilla and left cervical region.    The cardiac silhouette is normal in size. The hilar and mediastinal contours are unremarkable.    Bones are intact.                               CT Abdomen Pelvis  Without Contrast (Final result)  Result time 03/12/20 08:01:28    Final result by Aurelio Curtis MD (03/12/20 08:01:28)                 Impression:      Prominent small bowel loops are seen throughout the abdomen with transition suspected within the upper pelvis likely at the site of a bowel anastomosis with decompressed ileum distally.  Small bowel loops are thickened at the level near the obstruction without evidence of pneumatosis.  Findings concerning for bowel obstruction.    Small hiatal hernia containing contrast and mild thickening at the GE junction likely reflects reflux disease.  Thickening of the rugal folds throughout the stomach likely reflects gastritis. Recommend endoscopy follow-up.    All CT scans at this  facility use dose modulation, iterative reconstruction and/or weight based dosing when appropriate to reduce radiation dose to as low as reasonably achievable.      Electronically signed by: Aurelio Curtis MD  Date:    03/12/2020  Time:    08:01             Narrative:    EXAMINATION:  CT ABDOMEN PELVIS WITHOUT CONTRAST    CLINICAL HISTORY:  Abdominal pain, unspecified;    TECHNIQUE:  Routine 5 mm non-contrast images of the abdomen and pelvis were done.  Sagittal and coronal reformats were also submitted for interpretation.    COMPARISON:  None    FINDINGS:  No consolidation.  Chronic interstitial thickening suspected at the right lung base.  Acute interstitial pneumonia not excluded.  No pleural effusion or pneumothorax identified heart size is normal.  Severe coronary disease.    The liver is normal in size and attenuation with no focal hepatic abnormality.  Moderate gallbladder distension.  No stones are identified.  No significant intrahepatic ductal dilatation.  Common bile duct is dilated measuring 11 mm without focal obstruction or stone.    The spleen, pancreas, and adrenal glands are unremarkable.  Benign splenic granuloma are noted.    Kidneys are small in size.  Nonobstructing stone lower pole of the right kidney.  Horseshoe configuration is noted.  Nonobstructing stone lower pole right kidney.  Mild left-sided hydronephrosis double-J ureteral stent is noted in satisfactory position.  Bladder demonstrates mild nonspecific bladder wall thickening which can be seen with cystitis..    Small hiatal hernia containing contrast and mild thickening at the GE junction likely reflects reflux disease.  Thickening of the rugal folds throughout the stomach likely reflects gastritis.  Recommend endoscopy follow-up.  Duodenal diverticulum is seen along the 3rd portion the duodenum.  Prominent small bowel loops are seen throughout the abdomen with transition suspected within the upper pelvis likely at the site of a bowel  anastomosis with decompressed ileum distally.  Findings concerning for bowel obstruction.  Large bowel demonstrates fatty infiltration of the wall of the colon likely reflecting chronic inflammatory process.  Mild constipation.  Appendix is not seen..  Moderate diverticulosis seen throughout the descending and sigmoid colon.  No definite evidence for acute diverticulitis.  Trace ascites.  No free air.    There is no evidence of lymph node enlargement in the abdomen or pelvis.  Shotty nodes are seen within the mesentery and retroperitoneum.    The abdominal aorta is small caliber with severe atherosclerotic calcifications.  Subclavian femoral bypass and fem-fem bypass grafts are noted.  Extensive clips are seen in the bilateral groin.    Moderate degenerative changes throughout the lumbar spine.  Chronic right rib fracture deformity involving the 7th rib laterally.  Chronic compression deformities at L3 and L1.                               X-Ray Abdomen Flat And Erect (Final result)  Result time 03/11/20 22:41:16    Final result by Aurelio Briceno MD (03/11/20 22:41:16)                 Impression:      Dilated loops of small bowel with multiple air-fluid levels consistent with a small-bowel obstruction.  No definite evidence of free air.      Electronically signed by: Aurelio Briceno MD  Date:    03/11/2020  Time:    22:41             Narrative:    EXAMINATION:  XR ABDOMEN FLAT AND ERECT    CLINICAL HISTORY:  Generalized abdominal pain    TECHNIQUE:  Flat and erect AP views of the abdomen were performed.    COMPARISON:  03/27/2019    FINDINGS:  Left ureteral stent.  Multiple air-fluid levels with distended small bowel loops consistent with a small bowel obstruction.                               RADIOLOGY REPORT (Final result)  Result time 03/13/20 17:14:06    Final result by Unknown User (03/13/20 17:14:06)                                    Pending Diagnostic Studies:     None         Medications:  Reconciled Home  Medications:      Medication List      CONTINUE taking these medications    albuterol-ipratropium 2.5 mg-0.5 mg/3 mL nebulizer solution  Commonly known as:  DUO-NEB  Take 3 mLs by nebulization every 8 (eight) hours as needed for Wheezing or Shortness of Breath. Rescue     amLODIPine 10 MG tablet  Commonly known as:  NORVASC  TAKE 1 TABLET EVERY DAY     aspirin 81 MG EC tablet  Commonly known as:  ECOTRIN  Take 1 tablet (81 mg total) by mouth once daily.     atorvastatin 40 MG tablet  Commonly known as:  LIPITOR  Take 1 tablet (40 mg total) by mouth once daily.     carvediloL 25 MG tablet  Commonly known as:  COREG  Take 1 tablet (25 mg total) by mouth 2 (two) times daily with meals.     cetirizine 10 MG tablet  Commonly known as:  ZYRTEC  Take 10 mg by mouth once daily.     clopidogreL 75 mg tablet  Commonly known as:  PLAVIX  TAKE 1 TABLET EVERY DAY     ferrous gluconate 324 MG tablet  Commonly known as:  FERGON  Take 1 tablet (324 mg total) by mouth 2 (two) times daily with meals.     folic acid-vit B6-vit B12 2.5-25-2 mg 2.5-25-2 mg Tab  Commonly known as:  FOLBIC or Equiv  Take 1 tablet by mouth once daily.     furosemide 20 MG tablet  Commonly known as:  LASIX  Take two tab on Mon/Wed/Friday and one tab on Tues/Thurs/ Sat/Sunday     hydrALAZINE 25 MG tablet  Commonly known as:  APRESOLINE  Take 1 tablet (25 mg total) by mouth every 12 (twelve) hours.     isosorbide mononitrate 60 MG 24 hr tablet  Commonly known as:  IMDUR  Take 2 tablets (120 mg total) by mouth once daily.     mupirocin 2 % ointment  Commonly known as:  BACTROBAN  Apply topically 3 (three) times daily.     nitroglycerin 0.6 MG Subl  Commonly known as:  NITROSTAT  Place 1 tablet (0.6 mg total) under the tongue every 5 (five) minutes as needed (max 3/ per episode).     omeprazole 20 MG capsule  Commonly known as:  PRILOSEC  TAKE 1 CAPSULE TWICE DAILY     OXYGEN-AIR DELIVERY SYSTEMS MISC  by Misc.(Non-Drug; Combo Route) route.     sertraline 50 MG  tablet  Commonly known as:  ZOLOFT  Take 1 tablet (50 mg total) by mouth once daily.     triamcinolone acetonide 0.1% 0.1 % ointment  Commonly known as:  KENALOG  Apply topically 2 (two) times daily.            Indwelling Lines/Drains at time of discharge:   Lines/Drains/Airways     None                 Time spent on the discharge of patient: > 35 minutes  Patient was seen and examined on the date of discharge and determined to be suitable for discharge.         Brennen Weiner NP  Department of Hospital Medicine  [facility]

## 2020-03-17 NOTE — ASSESSMENT & PLAN NOTE
71yo M with multiple previous abdominal operations with SBO, now resolved    - okay for reg diet  - if continues to tolerate, okay to discharge home from surgical perspective   - no clinic followup required

## 2020-03-17 NOTE — PLAN OF CARE
Mar27 Established Patient Visit with Porter Salinas NP   Friday Mar 27, 2020 11:00 AM   Arrive at check-in approximately 15 minutes before your scheduled appointment time. Bring all outside medical records and imaging, along with a list of your current medications and insurance card. [facility]   [address]  [facility] [address]   [phone]         03/17/20 1409   Final Note   Assessment Type Final Discharge Note   Anticipated Discharge Disposition Home-Health   Hospital Follow Up  Appt(s) scheduled? Yes   Right Care Referral Info   Post Acute Recommendation Home-care   Facility Name [facility]

## 2020-03-17 NOTE — PLAN OF CARE
Problem: Adult Inpatient Plan of Care  Goal: Plan of Care Review  Outcome: Ongoing, Progressing     POC reviewed, including indications and possible side effects of administered medications. Patient verbalized understanding and teach back. No adverse reactions noted. Patient c/o diarrhea. Administered medications per order. Tolerating diet well. Denies n/v. Repositioning and breathing exercises encouraged. NSR on heart monitor. VSS. NADN. Patient remains free of falls and injuries during shift. Will continue to monitor.    Chart check complete.

## 2020-03-17 NOTE — SUBJECTIVE & OBJECTIVE
Interval History: SBFT normal. Tolerating reg diet with BM/flatus now. NO current abdominal pain, distention, nausea or vomiting.    Medications:  Continuous Infusions:   sodium chloride 0.9% 100 mL/hr at 03/16/20 1658     Scheduled Meds:   amLODIPine  10 mg Oral Daily    aspirin  81 mg Oral Daily    atorvastatin  40 mg Oral Daily    carvediloL  25 mg Oral BID WM    famotidine  20 mg Intravenous Daily    ferrous gluconate  324 mg Oral BID WM    folic acid-vit B6-vit B12 2.5-25-2 mg  1 tablet Oral Daily    hydrALAZINE  25 mg Oral Q12H    isosorbide mononitrate  120 mg Oral Daily    sertraline  50 mg Oral Daily     PRN Meds:sodium chloride, albuterol-ipratropium, Dextrose 10% Bolus, Dextrose 10% Bolus, glucagon (human recombinant), glucose, glucose, hydrALAZINE, nitroGLYCERIN, ondansetron, promethazine (PHENERGAN) IVPB, sodium chloride 0.9%     Review of patient's allergies indicates:   Allergen Reactions    Morphine Itching     Objective:     Vital Signs (Most Recent):  Temp: 97.7 °F (36.5 °C) (03/17/20 0717)  Pulse: 61 (03/17/20 0717)  Resp: 18 (03/17/20 0717)  BP: (!) 179/81 (03/17/20 0717)  SpO2: 98 % (03/17/20 0717) Vital Signs (24h Range):  Temp:  [97.3 °F (36.3 °C)-98.2 °F (36.8 °C)] 97.7 °F (36.5 °C)  Pulse:  [59-69] 61  Resp:  [16-18] 18  SpO2:  [96 %-100 %] 98 %  BP: (127-179)/(62-93) 179/81     Weight: 67.6 kg (149 lb 0.5 oz)  Body mass index is 19.66 kg/m².    Intake/Output - Last 3 Shifts       03/15 0700 - 03/16 0659 03/16 0700 - 03/17 0659 03/17 0700 - 03/18 0659    P.O. 1560 1100     I.V. (mL/kg) 2458.3 (36.4)      Blood 556.3      Total Intake(mL/kg) 4574.6 (67.7) 1100 (16.3)     Urine (mL/kg/hr) 1225 (0.8) 1150 (0.7)     Stool 0 0     Total Output 1225 1150     Net +3349.6 -50            Urine Occurrence 1 x 1 x     Stool Occurrence 0 x 6 x           Physical Exam   Constitutional: He is oriented to person, place, and time. He appears well-developed.   HENT:   Head: Normocephalic and  atraumatic.   Eyes: Conjunctivae and EOM are normal.   Neck: Normal range of motion. No thyromegaly present.   Cardiovascular: Normal rate and regular rhythm.   Pulmonary/Chest: Effort normal. No respiratory distress.   Abdominal:   Soft, nondistented, nontender no tympany; well-healed previous midline incision   Musculoskeletal: Normal range of motion. He exhibits no edema or tenderness.   Neurological: He is alert and oriented to person, place, and time.   Skin: Skin is warm and dry. Capillary refill takes less than 2 seconds. No rash noted.   Psychiatric: He has a normal mood and affect.     Significant Labs:  CBC:   Recent Labs   Lab 03/17/20  0549   WBC 4.86   RBC 2.64*   HGB 7.3*   HCT 24.5*      MCV 93   MCH 27.7   MCHC 29.8*     BMP:   Recent Labs   Lab 03/15/20  0552  03/17/20  0549   GLU 84   < > 85      < > 137   K 4.0   < > 3.9   *   < > 113*   CO2 18*   < > 17*   BUN 26*   < > 14   CREATININE 1.4   < > 1.3   CALCIUM 8.5*   < > 8.4*   MG 2.0  --   --     < > = values in this interval not displayed.       Significant Diagnostics:  I have reviewed all pertinent imaging results/findings within the past 24 hours.     SBFT:  FINDINGS:  Small bowel caliber and mucosal pattern are normal. No mass, stricture, diverticula, or any intrinsic or extrinsic abnormalities demonstrated. The terminal ileum is normal in appearance.    Small bowel transit time to the colon is within normal limits at  minutes.    Fluoroscopic time 0.9 minutes and 21 fluoroscopic images were obtained.      Impression       No significant abnormalities.  No stricture

## 2020-03-17 NOTE — PLAN OF CARE
Accepted by [facility]          03/17/20 0956   Post-Acute Status   Post-Acute Authorization Home Health/Hospice   Discharge Delays (!) Other

## 2020-03-17 NOTE — PROGRESS NOTES
[facility]   General Surgery  Progress Note    Subjective:     History of Present Illness:  70-year-old male with a past medical history significant for anemia, GERD, HTN, HLD, CKD, CAD and previous AAA repair who presented to the [facility] emergency department with abdominal pain, nausea and vomiting x1 day.  Patient states the pain and nausea began while simultaneously, with pain being sharp and moderate in severity.  Patient states that his last bowel movement was 2 days ago.  Workup in emergency department was worrisome for a small-bowel obstruction. Patient was admitted to the medical service and surgery is consulted for evaluation.  Patient denies any current fever, chills, hematochezia or melena.  Of note, patient had a similar episode in 2017 that resolved with non operative management. Multiple previous abdominal operations, including AAA repair, AAA graft excision.     Post-Op Info:  * No surgery found *         Interval History: SBFT normal. Tolerating reg diet with BM/flatus now. NO current abdominal pain, distention, nausea or vomiting.    Medications:  Continuous Infusions:   sodium chloride 0.9% 100 mL/hr at 03/16/20 1658     Scheduled Meds:   amLODIPine  10 mg Oral Daily    aspirin  81 mg Oral Daily    atorvastatin  40 mg Oral Daily    carvediloL  25 mg Oral BID WM    famotidine  20 mg Intravenous Daily    ferrous gluconate  324 mg Oral BID WM    folic acid-vit B6-vit B12 2.5-25-2 mg  1 tablet Oral Daily    hydrALAZINE  25 mg Oral Q12H    isosorbide mononitrate  120 mg Oral Daily    sertraline  50 mg Oral Daily     PRN Meds:sodium chloride, albuterol-ipratropium, Dextrose 10% Bolus, Dextrose 10% Bolus, glucagon (human recombinant), glucose, glucose, hydrALAZINE, nitroGLYCERIN, ondansetron, promethazine (PHENERGAN) IVPB, sodium chloride 0.9%     Review of patient's allergies indicates:   Allergen Reactions    Morphine Itching     Objective:     Vital Signs (Most  Recent):  Temp: 97.7 °F (36.5 °C) (03/17/20 0717)  Pulse: 61 (03/17/20 0717)  Resp: 18 (03/17/20 0717)  BP: (!) 179/81 (03/17/20 0717)  SpO2: 98 % (03/17/20 0717) Vital Signs (24h Range):  Temp:  [97.3 °F (36.3 °C)-98.2 °F (36.8 °C)] 97.7 °F (36.5 °C)  Pulse:  [59-69] 61  Resp:  [16-18] 18  SpO2:  [96 %-100 %] 98 %  BP: (127-179)/(62-93) 179/81     Weight: 67.6 kg (149 lb 0.5 oz)  Body mass index is 19.66 kg/m².    Intake/Output - Last 3 Shifts       03/15 0700 - 03/16 0659 03/16 0700 - 03/17 0659 03/17 0700 - 03/18 0659    P.O. 1560 1100     I.V. (mL/kg) 2458.3 (36.4)      Blood 556.3      Total Intake(mL/kg) 4574.6 (67.7) 1100 (16.3)     Urine (mL/kg/hr) 1225 (0.8) 1150 (0.7)     Stool 0 0     Total Output 1225 1150     Net +3349.6 -50            Urine Occurrence 1 x 1 x     Stool Occurrence 0 x 6 x           Physical Exam   Constitutional: He is oriented to person, place, and time. He appears well-developed.   HENT:   Head: Normocephalic and atraumatic.   Eyes: Conjunctivae and EOM are normal.   Neck: Normal range of motion. No thyromegaly present.   Cardiovascular: Normal rate and regular rhythm.   Pulmonary/Chest: Effort normal. No respiratory distress.   Abdominal:   Soft, nondistented, nontender no tympany; well-healed previous midline incision   Musculoskeletal: Normal range of motion. He exhibits no edema or tenderness.   Neurological: He is alert and oriented to person, place, and time.   Skin: Skin is warm and dry. Capillary refill takes less than 2 seconds. No rash noted.   Psychiatric: He has a normal mood and affect.     Significant Labs:  CBC:   Recent Labs   Lab 03/17/20  0549   WBC 4.86   RBC 2.64*   HGB 7.3*   HCT 24.5*      MCV 93   MCH 27.7   MCHC 29.8*     BMP:   Recent Labs   Lab 03/15/20  0552  03/17/20  0549   GLU 84   < > 85      < > 137   K 4.0   < > 3.9   *   < > 113*   CO2 18*   < > 17*   BUN 26*   < > 14   CREATININE 1.4   < > 1.3   CALCIUM 8.5*   < > 8.4*   MG 2.0  --    --     < > = values in this interval not displayed.       Significant Diagnostics:  I have reviewed all pertinent imaging results/findings within the past 24 hours.     SBFT:  FINDINGS:  Small bowel caliber and mucosal pattern are normal. No mass, stricture, diverticula, or any intrinsic or extrinsic abnormalities demonstrated. The terminal ileum is normal in appearance.    Small bowel transit time to the colon is within normal limits at  minutes.    Fluoroscopic time 0.9 minutes and 21 fluoroscopic images were obtained.      Impression       No significant abnormalities.  No stricture     Assessment/Plan:     * SBO (small bowel obstruction)  71yo M with multiple previous abdominal operations with SBO, now resolved    - okay for reg diet  - if continues to tolerate, okay to discharge home from surgical perspective   - no clinic followup required    CKD (chronic kidney disease) stage 4, GFR 15-29 ml/min  Management per primary team    Anemia  Management per primary team, transfuse prn    Essential hypertension  Management per primary team        Leonardo Yang MD  General Surgery  [facility]

## 2020-03-22 NOTE — ASSESSMENT & PLAN NOTE
CT of neck negative for any acute findings per virtual read, CT of head negative for any acute findings per virtual read.    No meningeal signs on exam.  No WBC, afebrile.  Infectious process not suspected.    Goal for better control the BP.  Consider ultrasound bilateral carotids and are further imaging pending course.    Correct electrolyte abnormalities and acute on chronic renal failure.    Tylenol   P.r.n.   try to avoid any narcotics for altering medications, avoid NSAIDs.  Consider dose of steroid to assist with pain pending course    Trend troponins and consider further cardiac workup pending course.  Further evaluation/diagnostics/interventions/consults pending course       Dr Valentino

## 2020-03-23 DIAGNOSIS — I10 ESSENTIAL HYPERTENSION: Chronic | ICD-10-CM

## 2020-03-23 RX ORDER — AMLODIPINE BESYLATE 10 MG/1
TABLET ORAL
Qty: 90 TABLET | Refills: 3 | Status: ON HOLD | OUTPATIENT
Start: 2020-03-23 | End: 2021-01-01 | Stop reason: HOSPADM

## 2020-03-23 RX ORDER — CLOPIDOGREL BISULFATE 75 MG/1
TABLET ORAL
Qty: 90 TABLET | Refills: 3 | Status: ON HOLD | OUTPATIENT
Start: 2020-03-23 | End: 2020-06-02 | Stop reason: HOSPADM

## 2020-03-26 ENCOUNTER — CARE AT HOME (OUTPATIENT)
Dept: HOME HEALTH SERVICES | Facility: CLINIC | Age: 70
End: 2020-03-26
Payer: MEDICARE

## 2020-03-26 VITALS
RESPIRATION RATE: 18 BRPM | TEMPERATURE: 98 F | DIASTOLIC BLOOD PRESSURE: 64 MMHG | OXYGEN SATURATION: 98 % | SYSTOLIC BLOOD PRESSURE: 132 MMHG | HEART RATE: 71 BPM

## 2020-03-26 DIAGNOSIS — R07.89 ATYPICAL CHEST PAIN: ICD-10-CM

## 2020-03-26 DIAGNOSIS — Z09 FOLLOW UP: Primary | ICD-10-CM

## 2020-03-26 PROCEDURE — 99348 HOME/RES VST EST LOW MDM 30: CPT | Mod: ,,, | Performed by: NURSE PRACTITIONER

## 2020-03-26 PROCEDURE — 3075F SYST BP GE 130 - 139MM HG: CPT | Mod: CPTII,S$GLB,, | Performed by: NURSE PRACTITIONER

## 2020-03-26 PROCEDURE — 3075F PR MOST RECENT SYSTOLIC BLOOD PRESS GE 130-139MM HG: ICD-10-PCS | Mod: CPTII,S$GLB,, | Performed by: NURSE PRACTITIONER

## 2020-03-26 PROCEDURE — 1159F PR MEDICATION LIST DOCUMENTED IN MEDICAL RECORD: ICD-10-PCS | Mod: S$GLB,,, | Performed by: NURSE PRACTITIONER

## 2020-03-26 PROCEDURE — 1101F PR PT FALLS ASSESS DOC 0-1 FALLS W/OUT INJ PAST YR: ICD-10-PCS | Mod: CPTII,S$GLB,, | Performed by: NURSE PRACTITIONER

## 2020-03-26 PROCEDURE — 3078F PR MOST RECENT DIASTOLIC BLOOD PRESSURE < 80 MM HG: ICD-10-PCS | Mod: CPTII,S$GLB,, | Performed by: NURSE PRACTITIONER

## 2020-03-26 PROCEDURE — 99348 PR HOME VISIT,ESTAB PATIENT,LEVEL II: ICD-10-PCS | Mod: ,,, | Performed by: NURSE PRACTITIONER

## 2020-03-26 PROCEDURE — 1101F PT FALLS ASSESS-DOCD LE1/YR: CPT | Mod: CPTII,S$GLB,, | Performed by: NURSE PRACTITIONER

## 2020-03-26 PROCEDURE — 1126F AMNT PAIN NOTED NONE PRSNT: CPT | Mod: S$GLB,,, | Performed by: NURSE PRACTITIONER

## 2020-03-26 PROCEDURE — 1126F PR PAIN SEVERITY QUANTIFIED, NO PAIN PRESENT: ICD-10-PCS | Mod: S$GLB,,, | Performed by: NURSE PRACTITIONER

## 2020-03-26 PROCEDURE — 3078F DIAST BP <80 MM HG: CPT | Mod: CPTII,S$GLB,, | Performed by: NURSE PRACTITIONER

## 2020-03-26 PROCEDURE — 1159F MED LIST DOCD IN RCRD: CPT | Mod: S$GLB,,, | Performed by: NURSE PRACTITIONER

## 2020-03-26 RX ORDER — ISOSORBIDE MONONITRATE 60 MG/1
120 TABLET, EXTENDED RELEASE ORAL DAILY
Qty: 180 TABLET | Refills: 3
Start: 2020-03-26 | End: 2020-04-15 | Stop reason: DRUGHIGH

## 2020-03-26 RX ORDER — ATORVASTATIN CALCIUM 40 MG/1
40 TABLET, FILM COATED ORAL DAILY
Qty: 90 TABLET | Refills: 3
Start: 2020-03-26 | End: 2020-06-22 | Stop reason: SDUPTHER

## 2020-03-26 RX ORDER — TRIAMCINOLONE ACETONIDE 1 MG/G
CREAM TOPICAL 2 TIMES DAILY
Qty: 1 TUBE | Refills: 3 | Status: SHIPPED | OUTPATIENT
Start: 2020-03-26 | End: 2020-09-10 | Stop reason: SDUPTHER

## 2020-03-26 NOTE — PROGRESS NOTES
[facility]  [facility]    Visit Date: 3/26/2020  Encounter Provider: Porter Salinas NP  PCP:  Norma Nuñez MD    Subjective:      Patient ID: Kodi Ribeiro is a 70 y.o. male.    Consult Requested By:  Porter Salinas  Reason for Consult: Medical Follow-Up and Medication Review    The patient is being seen at home due to physical debility that presents a taxing effort to leave the home, to mitigate high risk of hospital readmission or due to the limited availability of reliable or safe options for transportation to the point of access to the provider. Prior to treatment on this visit the chart was reviewed and patient consent was obtained.    Chief Complaint: Follow-up for chronic medical conditions and medication revie    Today:  Mr. Kodi Ribeiro is a 70 y.o. male is being seen today for follow-up for chronic medical conditions and medication review. Kodi presents at baseline state of health as reported by patient and caregiver. VSS. Denies any acute issues, concerns or complaints to address on today's visit. Reports taking all medications as prescribed. No other needs identified at this time.  Refills on medications called in.     ROS:   Review of Systems   Constitutional: Negative for chills and fever.   HENT: Negative.    Eyes: Negative.    Respiratory: Negative.  Negative for cough and shortness of breath.    Cardiovascular: Negative for chest pain.   Gastrointestinal: Negative.  Negative for constipation and nausea.   Endocrine: Negative.    Genitourinary: Negative.    Musculoskeletal: Positive for gait problem.        Amputee   Skin: Negative.    Allergic/Immunologic: Negative.    Hematological: Negative.    Psychiatric/Behavioral: Negative.    All other systems reviewed and are negative.      Assessments:  · Environmental: single story home, no steps to enter, adequate lighting and temeprature control  · Functional Status: Independent with ADL's/IADL's, ambulates  with assistance of a cane/walker, continent of bowel and bladder  · Safety: Fall Precautions, Oxygen Dependent  · Nutritional: Adequate  · [facility]  · DME/Supplies: wheelchair, rollator walker, shower bench, oxygen tanks/concentrator     Objective:     Vitals:    03/26/20 1135   BP: 132/64   Pulse: 71   Resp: 18   Temp: 98.2 °F (36.8 °C)   TempSrc: Temporal   SpO2: 98%   PF: (!) 21 L/min   PainSc: 0-No pain     There is no height or weight on file to calculate BMI.  Physical Exam:   Physical Exam   Constitutional: He is oriented to person, place, and time. He appears well-developed and well-nourished.   HENT:   Head: Normocephalic and atraumatic.   Eyes: Pupils are equal, round, and reactive to light. EOM are normal.   Neck: Normal range of motion. Neck supple. No tracheal deviation present.   Cardiovascular: Normal rate and regular rhythm.   Murmur heard.  Pulmonary/Chest: Effort normal and breath sounds normal. No respiratory distress.   Abdominal: Soft. Bowel sounds are normal.   Musculoskeletal: Normal range of motion.   Bilateral amputee   Lymphadenopathy:     He has no cervical adenopathy.   Neurological: He is alert and oriented to person, place, and time.   Skin: Skin is warm and dry. Capillary refill takes less than 2 seconds.   Psychiatric: He has a normal mood and affect. His behavior is normal. Judgment normal.   Vitals reviewed.      Assessment:     Medical Follow-Up and Medication Review    Plan:     Ethical / Legal: Advance Care Planning   Capacity to make medical decisions:  yes, Conflict no  · Surrogate decision maker:  Name Laury Ross, Relationship: wife  · Advance Directives:  none  · HCPOA: none  · LaPOST:  none  · Code Status:  Full    Advanced Care Directives, HCPOA and LaPost forms left in the home for family review, discussion and signing with instructions to return upon their next provider encounter for inclusion to the medical record.     Kodi was seen today for  follow-up.  Diagnoses and all orders for this visit:     Encounter for Medical Follow-Up and Medication Review   - [facility] Nurse Practitioner to schedule home visit with patient in 4 weeks or PRN    Were controlled substances prescribed?  No    Follow Up Appointments:   Future Appointments   Date Time Provider Department Center   4/3/2020 10:30 AM Brenton Jacobson MD [facility]  Medical C   4/23/2020  1:00 PM Porter Simon, MILENA [address]   5/18/2020  1:00 PM Brenton Jacobson MD [facility]  Medical C     Signature:    Porter Simon, MSN, APRN, FNP-C  [facility]    Attestation: Screening criteria to assess the level of the patient's risk for infection with COVID-19 as recommended by the CDC at the time of the above documented home visit concluded appropriateness to proceed. Universal precautions were maintained at all times, including provider use of >60% alcohol gel hand  immediately prior to entry and upon departing the patient's home as well as cleaning of equipment used in home visit with antibacterial/germicidal disposable wipes.

## 2020-03-26 NOTE — PATIENT INSTRUCTIONS
- [facility] Nurse Practitioner to schedule home follow-up visit with patient in 4 weeks.  - Continue all medications, treatments and therapies as ordered.   - Follow all instructions, recommendations as discussed.  - Maintain Safety Precautions at all times.  - Attend all medical appointments as scheduled.  - For worsening symptoms: call Primary Care Physician or Nurse Practitioner.  - For emergencies, call 911 or immediately report to the nearest emergency room  - Limit Risks of environmental exposure to coronavirus as discussed including: social distancing, hand hygiene, and limiting departures from the home for necessities only.

## 2020-04-07 PROCEDURE — G0179 PR HOME HEALTH MD RECERTIFICATION: ICD-10-PCS | Mod: ,,, | Performed by: FAMILY MEDICINE

## 2020-04-07 PROCEDURE — G0179 MD RECERTIFICATION HHA PT: HCPCS | Mod: ,,, | Performed by: FAMILY MEDICINE

## 2020-04-09 ENCOUNTER — EXTERNAL HOME HEALTH (OUTPATIENT)
Dept: HOME HEALTH SERVICES | Facility: HOSPITAL | Age: 70
End: 2020-04-09
Payer: MEDICARE

## 2020-04-14 ENCOUNTER — TELEPHONE (OUTPATIENT)
Dept: FAMILY MEDICINE | Facility: CLINIC | Age: 70
End: 2020-04-14

## 2020-04-14 NOTE — TELEPHONE ENCOUNTER
Called [facility]ad[facility] and gave orders for CMP, CBC and Lipid A1c

## 2020-04-14 NOTE — TELEPHONE ENCOUNTER
----- Message from Yuridia Lipscomb sent at 4/14/2020  3:49 PM CDT -----  Contact: pt   Stated he's having a problem with his growing and can be reached at [phone] Thanks

## 2020-04-14 NOTE — TELEPHONE ENCOUNTER
Pt fell getting out of bathtub smashed his left testicle x 1 week ago. Testicle 4x normal size. Pain 7/10. Want him to come in office in the morning?

## 2020-04-14 NOTE — TELEPHONE ENCOUNTER
----- Message from Melyssa Conrad sent at 4/14/2020 11:27 AM CDT -----  Contact: Patient  Would like to speak with the nurse regarding orders for [facility]. Pt states that the home health can draw at there home. Please call back at [phone]. [facility] [phone].

## 2020-04-15 ENCOUNTER — HOSPITAL ENCOUNTER (OUTPATIENT)
Dept: RADIOLOGY | Facility: HOSPITAL | Age: 70
Discharge: HOME | End: 2020-04-15
Attending: FAMILY MEDICINE
Payer: MEDICARE

## 2020-04-15 ENCOUNTER — DOCUMENT SCAN (OUTPATIENT)
Dept: HOME HEALTH SERVICES | Facility: HOSPITAL | Age: 70
End: 2020-04-15
Payer: MEDICARE

## 2020-04-15 ENCOUNTER — TELEPHONE (OUTPATIENT)
Dept: FAMILY MEDICINE | Facility: CLINIC | Age: 70
End: 2020-04-15

## 2020-04-15 ENCOUNTER — OFFICE VISIT (OUTPATIENT)
Dept: FAMILY MEDICINE | Facility: CLINIC | Age: 70
End: 2020-04-15
Payer: MEDICARE

## 2020-04-15 VITALS
OXYGEN SATURATION: 99 % | BODY MASS INDEX: 22.08 KG/M2 | WEIGHT: 167.31 LBS | HEART RATE: 72 BPM | DIASTOLIC BLOOD PRESSURE: 66 MMHG | TEMPERATURE: 98 F | SYSTOLIC BLOOD PRESSURE: 138 MMHG

## 2020-04-15 DIAGNOSIS — N50.89 TESTICULAR SWELLING, LEFT: Primary | ICD-10-CM

## 2020-04-15 DIAGNOSIS — N50.89 TESTICULAR SWELLING, LEFT: ICD-10-CM

## 2020-04-15 DIAGNOSIS — Z09 HOSPITAL DISCHARGE FOLLOW-UP: Primary | ICD-10-CM

## 2020-04-15 PROCEDURE — 1126F AMNT PAIN NOTED NONE PRSNT: CPT | Mod: HCNC,S$GLB,, | Performed by: FAMILY MEDICINE

## 2020-04-15 PROCEDURE — 99999 PR PBB SHADOW E&M-EST. PATIENT-LVL III: CPT | Mod: PBBFAC,HCNC,, | Performed by: FAMILY MEDICINE

## 2020-04-15 PROCEDURE — 1159F PR MEDICATION LIST DOCUMENTED IN MEDICAL RECORD: ICD-10-PCS | Mod: HCNC,S$GLB,, | Performed by: FAMILY MEDICINE

## 2020-04-15 PROCEDURE — 3078F PR MOST RECENT DIASTOLIC BLOOD PRESSURE < 80 MM HG: ICD-10-PCS | Mod: HCNC,CPTII,S$GLB, | Performed by: FAMILY MEDICINE

## 2020-04-15 PROCEDURE — 76870 US EXAM SCROTUM: CPT | Mod: TC,HCNC

## 2020-04-15 PROCEDURE — 3075F PR MOST RECENT SYSTOLIC BLOOD PRESS GE 130-139MM HG: ICD-10-PCS | Mod: HCNC,CPTII,S$GLB, | Performed by: FAMILY MEDICINE

## 2020-04-15 PROCEDURE — 99213 PR OFFICE/OUTPT VISIT, EST, LEVL III, 20-29 MIN: ICD-10-PCS | Mod: HCNC,S$GLB,, | Performed by: FAMILY MEDICINE

## 2020-04-15 PROCEDURE — 1126F PR PAIN SEVERITY QUANTIFIED, NO PAIN PRESENT: ICD-10-PCS | Mod: HCNC,S$GLB,, | Performed by: FAMILY MEDICINE

## 2020-04-15 PROCEDURE — 99999 PR PBB SHADOW E&M-EST. PATIENT-LVL III: ICD-10-PCS | Mod: PBBFAC,HCNC,, | Performed by: FAMILY MEDICINE

## 2020-04-15 PROCEDURE — 1101F PT FALLS ASSESS-DOCD LE1/YR: CPT | Mod: HCNC,CPTII,S$GLB, | Performed by: FAMILY MEDICINE

## 2020-04-15 PROCEDURE — 1159F MED LIST DOCD IN RCRD: CPT | Mod: HCNC,S$GLB,, | Performed by: FAMILY MEDICINE

## 2020-04-15 PROCEDURE — 99213 OFFICE O/P EST LOW 20 MIN: CPT | Mod: HCNC,S$GLB,, | Performed by: FAMILY MEDICINE

## 2020-04-15 PROCEDURE — 3075F SYST BP GE 130 - 139MM HG: CPT | Mod: HCNC,CPTII,S$GLB, | Performed by: FAMILY MEDICINE

## 2020-04-15 PROCEDURE — 3078F DIAST BP <80 MM HG: CPT | Mod: HCNC,CPTII,S$GLB, | Performed by: FAMILY MEDICINE

## 2020-04-15 PROCEDURE — 1101F PR PT FALLS ASSESS DOC 0-1 FALLS W/OUT INJ PAST YR: ICD-10-PCS | Mod: HCNC,CPTII,S$GLB, | Performed by: FAMILY MEDICINE

## 2020-04-15 RX ORDER — ISOSORBIDE MONONITRATE 120 MG/1
1 TABLET, EXTENDED RELEASE ORAL DAILY
COMMUNITY
Start: 2020-04-07 | End: 2020-06-22 | Stop reason: SDUPTHER

## 2020-04-15 RX ORDER — DOXYCYCLINE HYCLATE 100 MG
100 TABLET ORAL EVERY 12 HOURS
Qty: 20 TABLET | Refills: 0 | Status: SHIPPED | OUTPATIENT
Start: 2020-04-15 | End: 2020-04-25

## 2020-04-15 RX ORDER — TRIAMCINOLONE ACETONIDE 1 MG/G
CREAM TOPICAL 2 TIMES DAILY
Qty: 1 TUBE | Refills: 1 | Status: SHIPPED | OUTPATIENT
Start: 2020-04-15 | End: 2020-04-25

## 2020-04-15 NOTE — PROGRESS NOTES
Chief Complaint:    Chief Complaint   Patient presents with    Groin Swelling       History of Present Illness:  He says he smashed his testicle when he tries to get out of the tub got smashed between is thigh and the top happened about a week back it has been painful since then.  No fever      ROS:  Review of Systems    Past Medical History:   Diagnosis Date    Acute on chronic congestive heart failure 1/13/2020    Acute respiratory failure with hypoxia 1/14/2020    Analgesic nephropathy     Anemia     AP (angina pectoris) 1/11/2019    Arthritis     Colon polyp     Repeat colonoscopy due in 9/14    COPD exacerbation 2/6/2020    Coronary artery disease     Diverticulosis     colonoscopy 2/21/2014    Dysthymia 2/13/2020    Encounter for blood transfusion     GERD (gastroesophageal reflux disease)     Hemorrhoids     colonoscopy 2/21/2014    Horseshoe kidney     Hyperglycemia 3/17/2014    Hyperlipidemia     Hypertension     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    Late complications of amputation stump     rseolved with further amputation( MRSA then none since 2014)    Lipoma of colon     colonoscopy 2/21/2014    Myocardial infarction     per patient 2000 & 9/2012    Peripheral vascular disease     Phantom limb syndrome     patient reports only intermittent not problematic, not worsening    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    Spinal cord disease     L4L5 disc    Stroke     Tobacco dependence     resolved    Ureteral stent retained        Social History:  Social History     Socioeconomic History    Marital status:      Spouse name: Laury    Number of children: 2    Years of education: Not on file    Highest education level: Not on file   Occupational History    Occupation: Retired      Comment: [employer]   Social Needs    Financial resource strain: Not on file    Food insecurity:     Worry: Not on file     Inability: Not on file     Transportation needs:     Medical: Not on file     Non-medical: Not on file   Tobacco Use    Smoking status: Former Smoker     Packs/day: 1.00     Years: 15.00     Pack years: 15.00     Last attempt to quit: 2009     Years since quittin.2    Smokeless tobacco: Never Used   Substance and Sexual Activity    Alcohol use: No    Drug use: No     Comment: Is on prescription opiod, no non prescribed use    Sexual activity: Not Currently     Partners: Female   Lifestyle    Physical activity:     Days per week: Not on file     Minutes per session: Not on file    Stress: Not on file   Relationships    Social connections:     Talks on phone: Not on file     Gets together: Not on file     Attends [employer] service: Not on file     Active member of club or organization: Not on file     Attends meetings of clubs or organizations: Not on file     Relationship status: Not on file   Other Topics Concern    Not on file   Social History Narrative     . 4 children alive and well. 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  Still drives. Does not have a Living Will.       Family History:   family history includes COPD in his mother; Cancer in his mother; Diabetes in his daughter; Heart disease in his father.    Health Maintenance   Topic Date Due    Colonoscopy  2020 (Originally 2019)    Lipid Panel  2020    High Dose Statin  2021    TETANUS VACCINE  2024    Hepatitis C Screening  Completed    Pneumococcal Vaccine (65+ High/Highest Risk)  Completed    Abdominal Aortic Aneurysm Screening  Addressed       Physical Exam:    Vital Signs  Temp: 98.1 °F (36.7 °C)  Temp src: Oral  Pulse: 72  SpO2: 99 %  BP: 138/66  BP Location: Left arm  Patient Position: Sitting  Pain Score: 0-No pain  Height and Weight  Weight: 75.9 kg (167 lb 5.3 oz)]    Body mass index is 22.08 kg/m².    Physical Exam   Genitourinary: Left testis shows swelling and tenderness.                Assessment:      ICD-10-CM ICD-9-CM   1. Testicular swelling, left N50.89 608.86         Plan:         Probably hematoma of the left testicle will get an ultrasound and then will consult urology see if in evacuation is needed.      Orders Placed This Encounter   Procedures    US Scrotum And Testicles       Current Outpatient Medications   Medication Sig Dispense Refill    albuterol-ipratropium (DUO-NEB) 2.5 mg-0.5 mg/3 mL nebulizer solution Take 3 mLs by nebulization every 8 (eight) hours as needed for Wheezing or Shortness of Breath. Rescue 90 mL 0    amLODIPine (NORVASC) 10 MG tablet TAKE 1 TABLET EVERY DAY 90 tablet 3    atorvastatin (LIPITOR) 40 MG tablet Take 1 tablet (40 mg total) by mouth once daily. 90 tablet 3    carvedilol (COREG) 25 MG tablet Take 1 tablet (25 mg total) by mouth 2 (two) times daily with meals. 60 tablet 11    cetirizine (ZYRTEC) 10 MG tablet Take 10 mg by mouth once daily.      clopidogreL (PLAVIX) 75 mg tablet TAKE 1 TABLET EVERY DAY 90 tablet 3    folic acid-vit B6-vit B12 2.5-25-2 mg (FOLBIC OR EQUIV) 2.5-25-2 mg Tab Take 1 tablet by mouth once daily. 90 tablet 3    furosemide (LASIX) 20 MG tablet Take two tab on Mon/Wed/Friday and one tab on Tues/Thurs/ Sat/Sunday 124 tablet 3    hydrALAZINE (APRESOLINE) 25 MG tablet Take 1 tablet (25 mg total) by mouth every 12 (twelve) hours. 180 tablet 3    mupirocin (BACTROBAN) 2 % ointment Apply topically 3 (three) times daily. 1 Tube 2    omeprazole (PRILOSEC) 20 MG capsule TAKE 1 CAPSULE TWICE DAILY 180 capsule 3    OXYGEN-AIR DELIVERY SYSTEMS MISC by [facility]Non-Drug; Combo Route) route.      sertraline (ZOLOFT) 50 MG tablet Take 1 tablet (50 mg total) by mouth once daily. 90 tablet 11    triamcinolone acetonide 0.1% (KENALOG) 0.1 % cream Apply topically 2 (two) times daily. 1 Tube 3    aspirin (ECOTRIN) 81 MG EC tablet Take 1 tablet (81 mg total) by mouth once daily.  0    ferrous gluconate (FERGON) 324 MG tablet  Take 1 tablet (324 mg total) by mouth 2 (two) times daily with meals. (Patient not taking: Reported on 4/15/2020) 180 tablet 3    isosorbide mononitrate (IMDUR) 120 MG 24 hr tablet Take 1 tablet by mouth once daily.      nitroglycerin (NITROSTAT) 0.6 MG Subl Place 1 tablet (0.6 mg total) under the tongue every 5 (five) minutes as needed (max 3/ per episode). 30 tablet 1    triamcinolone acetonide 0.1% (KENALOG) 0.1 % cream Apply topically 2 (two) times daily. for 10 days 1 Tube 1     No current facility-administered medications for this visit.        Medications Discontinued During This Encounter   Medication Reason    isosorbide mononitrate (IMDUR) 60 MG 24 hr tablet Dose adjustment       No follow-ups on file.      Norma Nuñez MD

## 2020-04-16 ENCOUNTER — DOCUMENT SCAN (OUTPATIENT)
Dept: HOME HEALTH SERVICES | Facility: HOSPITAL | Age: 70
End: 2020-04-16
Payer: MEDICARE

## 2020-04-21 RX ORDER — OMEPRAZOLE 20 MG/1
CAPSULE, DELAYED RELEASE ORAL
Qty: 180 CAPSULE | Refills: 3 | Status: SHIPPED | OUTPATIENT
Start: 2020-04-21 | End: 2021-01-01

## 2020-04-23 ENCOUNTER — CARE AT HOME (OUTPATIENT)
Dept: HOME HEALTH SERVICES | Facility: CLINIC | Age: 70
End: 2020-04-23
Payer: MEDICARE

## 2020-04-23 VITALS
HEART RATE: 67 BPM | OXYGEN SATURATION: 98 % | RESPIRATION RATE: 16 BRPM | SYSTOLIC BLOOD PRESSURE: 134 MMHG | TEMPERATURE: 98 F | DIASTOLIC BLOOD PRESSURE: 64 MMHG

## 2020-04-23 DIAGNOSIS — R07.89 ATYPICAL CHEST PAIN: ICD-10-CM

## 2020-04-23 DIAGNOSIS — Z09 FOLLOW UP: Primary | ICD-10-CM

## 2020-04-23 PROCEDURE — 1126F AMNT PAIN NOTED NONE PRSNT: CPT | Mod: S$GLB,,, | Performed by: NURSE PRACTITIONER

## 2020-04-23 PROCEDURE — 1159F PR MEDICATION LIST DOCUMENTED IN MEDICAL RECORD: ICD-10-PCS | Mod: S$GLB,,, | Performed by: NURSE PRACTITIONER

## 2020-04-23 PROCEDURE — 3078F PR MOST RECENT DIASTOLIC BLOOD PRESSURE < 80 MM HG: ICD-10-PCS | Mod: CPTII,S$GLB,, | Performed by: NURSE PRACTITIONER

## 2020-04-23 PROCEDURE — 99348 HOME/RES VST EST LOW MDM 30: CPT | Mod: ,,, | Performed by: NURSE PRACTITIONER

## 2020-04-23 PROCEDURE — 3075F SYST BP GE 130 - 139MM HG: CPT | Mod: CPTII,S$GLB,, | Performed by: NURSE PRACTITIONER

## 2020-04-23 PROCEDURE — 1101F PT FALLS ASSESS-DOCD LE1/YR: CPT | Mod: CPTII,S$GLB,, | Performed by: NURSE PRACTITIONER

## 2020-04-23 PROCEDURE — 3075F PR MOST RECENT SYSTOLIC BLOOD PRESS GE 130-139MM HG: ICD-10-PCS | Mod: CPTII,S$GLB,, | Performed by: NURSE PRACTITIONER

## 2020-04-23 PROCEDURE — 1101F PR PT FALLS ASSESS DOC 0-1 FALLS W/OUT INJ PAST YR: ICD-10-PCS | Mod: CPTII,S$GLB,, | Performed by: NURSE PRACTITIONER

## 2020-04-23 PROCEDURE — 1126F PR PAIN SEVERITY QUANTIFIED, NO PAIN PRESENT: ICD-10-PCS | Mod: S$GLB,,, | Performed by: NURSE PRACTITIONER

## 2020-04-23 PROCEDURE — 3078F DIAST BP <80 MM HG: CPT | Mod: CPTII,S$GLB,, | Performed by: NURSE PRACTITIONER

## 2020-04-23 PROCEDURE — 99348 PR HOME VISIT,ESTAB PATIENT,LEVEL II: ICD-10-PCS | Mod: ,,, | Performed by: NURSE PRACTITIONER

## 2020-04-23 PROCEDURE — 1159F MED LIST DOCD IN RCRD: CPT | Mod: S$GLB,,, | Performed by: NURSE PRACTITIONER

## 2020-04-24 ENCOUNTER — TELEPHONE (OUTPATIENT)
Dept: FAMILY MEDICINE | Facility: CLINIC | Age: 70
End: 2020-04-24

## 2020-04-24 DIAGNOSIS — N18.30 CKD (CHRONIC KIDNEY DISEASE) STAGE 3, GFR 30-59 ML/MIN: Primary | ICD-10-CM

## 2020-04-24 DIAGNOSIS — D64.9 ANEMIA, UNSPECIFIED TYPE: ICD-10-CM

## 2020-04-24 NOTE — PATIENT INSTRUCTIONS
- [facility] Nurse Practitioner to schedule home follow-up visit with patient in 4 weeks or as needed.  - Continue all medications, treatments and therapies as ordered.   - Follow all instructions, recommendations as discussed.  - Maintain Safety Precautions at all times.  - Attend all medical appointments as scheduled.  - For worsening symptoms: call Primary Care Physician or Nurse Practitioner.  - For emergencies, call 911 or immediately report to the nearest emergency room.  - Limit Risks of environmental exposure to coronavirus as discussed including: social distancing, hand hygiene, and limiting departures from the home for necessities only.

## 2020-04-24 NOTE — PROGRESS NOTES
[facility]  [facility]    Visit Date: 4/23/2020  Encounter Provider: Porter Salinas NP  PCP:  Norma Nuñez MD    Subjective:      Patient ID: Kodi Ribeiro is a 70 y.o. male.    Consult Requested By:  Porter Salinas  Reason for Consult: Medical Follow-Up and Medication Review    The patient is being seen at home due to physical debility that presents a taxing effort to leave the home, to mitigate high risk of hospital readmission or due to the limited availability of reliable or safe options for transportation to the point of access to the provider. Prior to treatment on this visit the chart was reviewed and patient consent was obtained.    Chief Complaint: Follow-up for chronic medical conditions and medication revie    Today:  Mr. Kodi Ribeiro is a 70 y.o. male is being seen today for follow-up for chronic medical conditions and medication review. Kodi presents at baseline state of health as reported by patient and caregiver. VSS. Denies any acute issues, concerns or complaints to address on today's visit. Reports taking all medications as prescribed. No other needs identified at this time.  Refills on medications called in.     ROS:   Review of Systems   Constitutional: Negative for chills and fever.   HENT: Negative.    Eyes: Negative.    Respiratory: Negative.  Negative for cough and shortness of breath.    Cardiovascular: Negative for chest pain.   Gastrointestinal: Negative.  Negative for constipation and nausea.   Endocrine: Negative.    Genitourinary: Negative.    Musculoskeletal: Positive for gait problem.        Amputee   Skin: Negative.    Allergic/Immunologic: Negative.    Hematological: Negative.    Psychiatric/Behavioral: Negative.    All other systems reviewed and are negative.    Assessments:  · Environmental: single story home, no steps to enter, adequate lighting and temeprature control  · Functional Status: Independent with ADL's/IADL's, ambulates  with assistance of a cane/walker, continent of bowel and bladder  · Safety: Fall Precautions, Oxygen Dependent  · Nutritional: Adequate  · [facility]  · DME/Supplies: wheelchair, rollator walker, shower bench, oxygen tanks/concentrator     Objective:     Vitals:    04/23/20 1245   BP: 134/64   Pulse: 67   Resp: 16   Temp: 97.8 °F (36.6 °C)   TempSrc: Temporal   SpO2: 98%   PainSc: 0-No pain     There is no height or weight on file to calculate BMI.    Physical Exam   Constitutional: He is oriented to person, place, and time. He appears well-developed and well-nourished.   HENT:   Head: Normocephalic and atraumatic.   Eyes: Pupils are equal, round, and reactive to light. EOM are normal.   Neck: Normal range of motion. Neck supple. No tracheal deviation present.   Cardiovascular: Normal rate and regular rhythm.   Murmur heard.  Pulmonary/Chest: Effort normal and breath sounds normal. No respiratory distress.   Abdominal: Soft. Bowel sounds are normal.   Musculoskeletal: Normal range of motion.   Bilateral amputee   Lymphadenopathy:     He has no cervical adenopathy.   Neurological: He is alert and oriented to person, place, and time.   Skin: Skin is warm and dry. Capillary refill takes less than 2 seconds.   Psychiatric: He has a normal mood and affect. His behavior is normal. Judgment normal.   Vitals reviewed.    Assessment:     Medical Follow-Up and Medication Review    Plan:     Ethical / Legal: Advance Care Planning   Capacity to make medical decisions:  yes, Conflict no  · Surrogate decision maker:  Name Laury Ross, Relationship: wife  · Advance Directives:  none  · HCPOA: none  · LaPOST:  none  · Code Status:  Full    Advanced Care Directives, HCPOA and LaPost forms left in the home for family review, discussion and signing with instructions to return upon their next provider encounter for inclusion to the medical record.     Kodi was seen today for follow-up.  Diagnoses and all orders for  this visit:     Encounter for Medical Follow-Up and Medication Review   - [facility] Nurse Practitioner to schedule home visit with patient in 4 weeks or PRN    Were controlled substances prescribed?  No    Follow Up Appointments:   Future Appointments   Date Time Provider Department Center   5/18/2020  1:00 PM Brenton Jacobson MD [facility] Medical C     Signature:    Porter Simon, MSN, APRN, FNP-C  [facility]    Attestation: Screening criteria to assess the level of the patient's risk for infection with COVID-19 as recommended by the CDC at the time of the above documented home visit concluded appropriateness to proceed. Universal precautions were maintained at all times, including provider use of >60% alcohol gel hand  immediately prior to entry and upon departing the patient's home as well as cleaning of equipment used in home visit with antibacterial/germicidal disposable wipes.

## 2020-04-27 ENCOUNTER — TELEPHONE (OUTPATIENT)
Dept: FAMILY MEDICINE | Facility: CLINIC | Age: 70
End: 2020-04-27

## 2020-04-27 ENCOUNTER — LAB VISIT (OUTPATIENT)
Dept: LAB | Facility: HOSPITAL | Age: 70
End: 2020-04-27
Attending: FAMILY MEDICINE
Payer: MEDICARE

## 2020-04-27 DIAGNOSIS — K92.1 BLOOD IN STOOL: Primary | ICD-10-CM

## 2020-04-27 DIAGNOSIS — N18.30 CKD (CHRONIC KIDNEY DISEASE) STAGE 3, GFR 30-59 ML/MIN: ICD-10-CM

## 2020-04-27 LAB
ALBUMIN SERPL BCP-MCNC: 3.5 G/DL (ref 3.5–5.2)
ALP SERPL-CCNC: 163 U/L (ref 55–135)
ALT SERPL W/O P-5'-P-CCNC: 12 U/L (ref 10–44)
ANION GAP SERPL CALC-SCNC: 10 MMOL/L (ref 8–16)
AST SERPL-CCNC: 13 U/L (ref 10–40)
BILIRUB SERPL-MCNC: 0.2 MG/DL (ref 0.1–1)
BUN SERPL-MCNC: 37 MG/DL (ref 8–23)
CALCIUM SERPL-MCNC: 9.1 MG/DL (ref 8.7–10.5)
CHLORIDE SERPL-SCNC: 109 MMOL/L (ref 95–110)
CO2 SERPL-SCNC: 19 MMOL/L (ref 23–29)
CREAT SERPL-MCNC: 2.2 MG/DL (ref 0.5–1.4)
EST. GFR  (AFRICAN AMERICAN): 33.6 ML/MIN/1.73 M^2
EST. GFR  (NON AFRICAN AMERICAN): 29.1 ML/MIN/1.73 M^2
GLUCOSE SERPL-MCNC: 96 MG/DL (ref 70–110)
POTASSIUM SERPL-SCNC: 4.7 MMOL/L (ref 3.5–5.1)
PROT SERPL-MCNC: 7.9 G/DL (ref 6–8.4)
SODIUM SERPL-SCNC: 138 MMOL/L (ref 136–145)

## 2020-04-27 PROCEDURE — 36415 COLL VENOUS BLD VENIPUNCTURE: CPT | Mod: HCNC,PO

## 2020-04-27 PROCEDURE — 80053 COMPREHEN METABOLIC PANEL: CPT | Mod: HCNC

## 2020-04-27 NOTE — TELEPHONE ENCOUNTER
Patient stopped by the office today for lab work and he c/o having blood when he wipes after having a bowel movement. He has had one stool with blood in it.   He says that he has had dark stools since January and when in the hospital he was told it was because he was on iron tablets.  He had an xray last month showing a partial bowel obstruction.  He did see Gi back when Dr Tanner was here. He does want to get back with GI and he agreed to the colonoscopy. He does acknowledge that he will go to the Ed if he has another bloody stool

## 2020-04-27 NOTE — TELEPHONE ENCOUNTER
Orders put in for GI consult, if he develops a lot of bleeding please go to the ED.  May need to hold the aspirin Plavix for the next couple days until all the bleeding stops

## 2020-04-27 NOTE — TELEPHONE ENCOUNTER
Pt wife stated they will hold off on blood thinners for the next two days. They will also wait for GI consult. Stated they will call us by Thursday to let us know how things go

## 2020-04-28 ENCOUNTER — TELEPHONE (OUTPATIENT)
Dept: FAMILY MEDICINE | Facility: CLINIC | Age: 70
End: 2020-04-28

## 2020-04-28 DIAGNOSIS — N18.30 CKD (CHRONIC KIDNEY DISEASE) STAGE 3, GFR 30-59 ML/MIN: Primary | ICD-10-CM

## 2020-04-28 NOTE — TELEPHONE ENCOUNTER
----- Message from Norma Nuñez MD sent at 4/27/2020 10:56 PM CDT -----  Mild improvement in renal function. Should see his nephrologist.

## 2020-04-29 ENCOUNTER — OFFICE VISIT (OUTPATIENT)
Dept: HEMATOLOGY/ONCOLOGY | Facility: CLINIC | Age: 70
End: 2020-04-29
Payer: MEDICARE

## 2020-04-29 DIAGNOSIS — N18.4 ANEMIA OF CHRONIC RENAL FAILURE, STAGE 4 (SEVERE): ICD-10-CM

## 2020-04-29 DIAGNOSIS — D64.9 ANEMIA, UNSPECIFIED TYPE: Primary | ICD-10-CM

## 2020-04-29 DIAGNOSIS — R76.8 ELEVATED SERUM IMMUNOGLOBULIN FREE LIGHT CHAINS: ICD-10-CM

## 2020-04-29 DIAGNOSIS — D63.1 ANEMIA OF CHRONIC RENAL FAILURE, STAGE 4 (SEVERE): ICD-10-CM

## 2020-04-29 DIAGNOSIS — K92.1 HEMATOCHEZIA: ICD-10-CM

## 2020-04-29 DIAGNOSIS — N18.4 CKD (CHRONIC KIDNEY DISEASE) STAGE 4, GFR 15-29 ML/MIN: ICD-10-CM

## 2020-04-29 PROCEDURE — 99443 PR PHYSICIAN TELEPHONE EVALUATION 21-30 MIN: ICD-10-PCS | Mod: HCNC,95,, | Performed by: INTERNAL MEDICINE

## 2020-04-29 PROCEDURE — 99443 PR PHYSICIAN TELEPHONE EVALUATION 21-30 MIN: CPT | Mod: HCNC,95,, | Performed by: INTERNAL MEDICINE

## 2020-04-29 NOTE — PROGRESS NOTES
Established Patient - Audio Only Telehealth Visit     The patient location is:  Home  The chief complaint leading to consultation is:  Anemia  Visit type: Virtual visit with audio only (telephone)      Visit length:  22 min    The reason for the audio only service rather than synchronous audio and video virtual visit was related to technical difficulties or patient preference/necessity.     Each patient to whom I provide medical services by telemedicine is:  (1) informed of the relationship between the physician and patient and the respective role of any other health care provider with respect to management of the patient; and (2) notified that they may decline to receive medical services by telemedicine and may withdraw from such care at any time. Patient verbally consented to receive this service via voice-only telephone call.       HPI:     Today the pleasure meeting for the 1st time Mr. Ribeiro via tele visit due to COVID-19.  He has been followed in our clinic for anemia previously by Breana Carreon NP in 3/5/19.      I reviewed his medical history which is notable for peripheral vascular disease status post bypass surgery, AAA, CHF.      He has been followed in Hematology Clinic for anemia.  Prior workup has included bone marrow biopsy in 2012 which showed: 30% cellularity.  No significant dysplasia.  Stainable iron is increased without increased ring sideroblasts.  Megakaryocytes are seen and adequate in number.  Normal male karyotype.  FISH without abnormality.  He notes history of anemia, previously diagnosed with iron deficiency and treated with IV iron therapy February 2019.  He has had 3 PRBC transfusions over the last several months.  Most recent hospitalization March 2020 with small-bowel obstruction treated conservatively with NG tube and during that admission noted to have hemoglobin 6.5 given 1 unit PRBC transfusion.  Over the last several months he has also had notable worsening in kidney  function.      He notes history of severe reflux disease somewhat controlled with PPI.  He does have bright red blood per rectum without tenesmus or melena, hemoptysis or hematemesis.  He denies fevers, chills, night sweats or unintentional weight loss.  His last IV iron therapy was 02/20/2019.       Assessment and plan:      # Anemia:  Most recent labs on 04/15/2020 show anemia hemoglobin 8, normocytic MCV 91, mild leukocytosis to 13 with mild left shift neutrophil predominance 84%.  Platelet count within normal limits.  Iron indices last on 2/2020 show 9% saturation, ferritin 311.  Last IV iron therapy 02/20/2020.  His ongoing hematochezia is concerning for GI bleed.  Small-bowel follow-through 03/20/2020 unrevealing.  03/16/2020 CT abdomen pelvis showed thickened rugal folds suggesting gastritis.  He does have history of inadequately controlled reflux disease.  Last EGD/colonoscopy was in 2014 recommended 5 year follow-up for which he is overdue.  There was mention of internal hemorrhoids.  I recommended GI consultation for consideration of repeat EGD/colonoscopy to workup hematochezia.  I would like to repeat labs including iron indices and if necessary arrange for repeat IV iron therapy.  He is also noted to have worsened renal function and differential diagnosis includes anemia of chronic kidney disease and may consider erythropoietin stimulating agent.    Will arrange for labs and hematology follow-up pending results, possible IV iron therapy and GI referral.                 This service was not originating from a related E/M service provided within the previous 7 days nor will  to an E/M service or procedure within the next 24 hours or my soonest available appointment.  Prevailing standard of care was able to be met in this audio-only visit.

## 2020-04-29 NOTE — LETTER
April 29, 2020      Brenton Jacobson MD  [address]  [place] [address]            [facility]  [address] [facility] [address]  Phone: [phone]  Fax: [phone]          Patient: Kodi Ribeiro   MR Number: [id number]   YOB: 1950   Date of Visit: 4/29/2020       Dear Dr. Brenton Jacobson:    Thank you for referring Kodi Ribeiro to me for evaluation. Attached you will find relevant portions of my assessment and plan of care.    If you have questions, please do not hesitate to call me. I look forward to following Kodi Ribeiro along with you.    Sincerely,    Lydia Rodriguez MD    Enclosure  CC:  No Recipients    If you would like to receive this communication electronically, please contact [email or web address] or [phone] to request more information on "[employer] Link access.    For providers and/or their staff who would like to refer a patient to [facility], please contact us through our one-stop-shop provider referral line, [facility], at [phone].    If you feel you have received this communication in error or would no longer like to receive these types of communications, please e-mail [email or web address]

## 2020-04-30 NOTE — PATIENT INSTRUCTIONS
GI consult, please arrange  Labs, please arrange for him to get this done at his convenience  Reminder to check in after lab results to determine follow-up necessary

## 2020-05-05 NOTE — PLAN OF CARE
Problem: Adult Inpatient Plan of Care  Goal: Plan of Care Review  Outcome: Ongoing (interventions implemented as appropriate)  Pt had no adverse events during shift. Pt free of falls. Call light in reach. Bed alarm set. Side rails x 3. Pain managed w/ PRN meds and ice. IVF/abx administered as ordered. NSR 80-90s on tele monitor. Pt speaks more frequently and clearly this shift and has also been more mobile in bed. VSS. Chart reviewed, will continue to monitor.        Alert/Awake/Cooperative

## 2020-05-18 ENCOUNTER — LAB VISIT (OUTPATIENT)
Dept: LAB | Facility: HOSPITAL | Age: 70
End: 2020-05-18
Attending: INTERNAL MEDICINE
Payer: MEDICARE

## 2020-05-18 ENCOUNTER — OFFICE VISIT (OUTPATIENT)
Dept: NEPHROLOGY | Facility: CLINIC | Age: 70
End: 2020-05-18
Payer: MEDICARE

## 2020-05-18 VITALS
HEIGHT: 73 IN | BODY MASS INDEX: 22 KG/M2 | HEART RATE: 80 BPM | WEIGHT: 166 LBS | DIASTOLIC BLOOD PRESSURE: 60 MMHG | SYSTOLIC BLOOD PRESSURE: 138 MMHG

## 2020-05-18 DIAGNOSIS — D64.9 ANEMIA, UNSPECIFIED TYPE: ICD-10-CM

## 2020-05-18 DIAGNOSIS — N18.4 ANEMIA OF CHRONIC RENAL FAILURE, STAGE 4 (SEVERE): ICD-10-CM

## 2020-05-18 DIAGNOSIS — I73.9 PVD (PERIPHERAL VASCULAR DISEASE): ICD-10-CM

## 2020-05-18 DIAGNOSIS — N18.30 CKD (CHRONIC KIDNEY DISEASE) STAGE 3, GFR 30-59 ML/MIN: ICD-10-CM

## 2020-05-18 DIAGNOSIS — I10 ESSENTIAL HYPERTENSION: ICD-10-CM

## 2020-05-18 DIAGNOSIS — Q63.1 HORSESHOE KIDNEY: ICD-10-CM

## 2020-05-18 DIAGNOSIS — E55.9 VITAMIN D DEFICIENCY: ICD-10-CM

## 2020-05-18 DIAGNOSIS — N18.4 CKD STAGE G4/A3, GFR 15-29 AND ALBUMIN CREATININE RATIO >300 MG/G: Primary | ICD-10-CM

## 2020-05-18 DIAGNOSIS — D63.1 ANEMIA OF CHRONIC RENAL FAILURE, STAGE 4 (SEVERE): ICD-10-CM

## 2020-05-18 DIAGNOSIS — N25.81 HYPERPARATHYROIDISM, SECONDARY RENAL: ICD-10-CM

## 2020-05-18 DIAGNOSIS — R80.1 PERSISTENT PROTEINURIA: ICD-10-CM

## 2020-05-18 DIAGNOSIS — N13.5 URETERAL STRICTURE, LEFT: ICD-10-CM

## 2020-05-18 DIAGNOSIS — N18.4 CKD (CHRONIC KIDNEY DISEASE) STAGE 4, GFR 15-29 ML/MIN: ICD-10-CM

## 2020-05-18 DIAGNOSIS — N17.9 AKI (ACUTE KIDNEY INJURY): ICD-10-CM

## 2020-05-18 PROBLEM — Z09 FOLLOW UP: Status: RESOLVED | Noted: 2020-02-13 | Resolved: 2020-05-18

## 2020-05-18 LAB
ALBUMIN SERPL BCP-MCNC: 3.5 G/DL (ref 3.5–5.2)
ALP SERPL-CCNC: 179 U/L (ref 55–135)
ALT SERPL W/O P-5'-P-CCNC: 11 U/L (ref 10–44)
ANION GAP SERPL CALC-SCNC: 9 MMOL/L (ref 8–16)
AST SERPL-CCNC: 14 U/L (ref 10–40)
BASOPHILS # BLD AUTO: 0.04 K/UL (ref 0–0.2)
BASOPHILS NFR BLD: 0.6 % (ref 0–1.9)
BILIRUB SERPL-MCNC: 0.3 MG/DL (ref 0.1–1)
BUN SERPL-MCNC: 38 MG/DL (ref 8–23)
CALCIUM SERPL-MCNC: 8.9 MG/DL (ref 8.7–10.5)
CHLORIDE SERPL-SCNC: 107 MMOL/L (ref 95–110)
CO2 SERPL-SCNC: 20 MMOL/L (ref 23–29)
CREAT SERPL-MCNC: 2.3 MG/DL (ref 0.5–1.4)
DIFFERENTIAL METHOD: ABNORMAL
EOSINOPHIL # BLD AUTO: 0.3 K/UL (ref 0–0.5)
EOSINOPHIL NFR BLD: 3.7 % (ref 0–8)
ERYTHROCYTE [DISTWIDTH] IN BLOOD BY AUTOMATED COUNT: 17.7 % (ref 11.5–14.5)
EST. GFR  (AFRICAN AMERICAN): 31.8 ML/MIN/1.73 M^2
EST. GFR  (NON AFRICAN AMERICAN): 27.5 ML/MIN/1.73 M^2
GLUCOSE SERPL-MCNC: 101 MG/DL (ref 70–110)
HCT VFR BLD AUTO: 25.8 % (ref 40–54)
HGB BLD-MCNC: 7.3 G/DL (ref 14–18)
IMM GRANULOCYTES # BLD AUTO: 0.01 K/UL (ref 0–0.04)
IMM GRANULOCYTES NFR BLD AUTO: 0.1 % (ref 0–0.5)
IRON SERPL-MCNC: 27 UG/DL (ref 45–160)
LYMPHOCYTES # BLD AUTO: 1.1 K/UL (ref 1–4.8)
LYMPHOCYTES NFR BLD: 15.9 % (ref 18–48)
MCH RBC QN AUTO: 26.4 PG (ref 27–31)
MCHC RBC AUTO-ENTMCNC: 28.3 G/DL (ref 32–36)
MCV RBC AUTO: 93 FL (ref 82–98)
MONOCYTES # BLD AUTO: 0.7 K/UL (ref 0.3–1)
MONOCYTES NFR BLD: 10.5 % (ref 4–15)
NEUTROPHILS # BLD AUTO: 4.7 K/UL (ref 1.8–7.7)
NEUTROPHILS NFR BLD: 69.2 % (ref 38–73)
NRBC BLD-RTO: 0 /100 WBC
PLATELET # BLD AUTO: 241 K/UL (ref 150–350)
PMV BLD AUTO: 9.9 FL (ref 9.2–12.9)
POTASSIUM SERPL-SCNC: 4.9 MMOL/L (ref 3.5–5.1)
PROT SERPL-MCNC: 8 G/DL (ref 6–8.4)
RBC # BLD AUTO: 2.77 M/UL (ref 4.6–6.2)
SATURATED IRON: 7 % (ref 20–50)
SODIUM SERPL-SCNC: 136 MMOL/L (ref 136–145)
TOTAL IRON BINDING CAPACITY: 364 UG/DL (ref 250–450)
TRANSFERRIN SERPL-MCNC: 246 MG/DL (ref 200–375)
WBC # BLD AUTO: 6.75 K/UL (ref 3.9–12.7)

## 2020-05-18 PROCEDURE — 99214 OFFICE O/P EST MOD 30 MIN: CPT | Mod: HCNC,S$GLB,, | Performed by: INTERNAL MEDICINE

## 2020-05-18 PROCEDURE — 1159F PR MEDICATION LIST DOCUMENTED IN MEDICAL RECORD: ICD-10-PCS | Mod: HCNC,S$GLB,, | Performed by: INTERNAL MEDICINE

## 2020-05-18 PROCEDURE — 1101F PT FALLS ASSESS-DOCD LE1/YR: CPT | Mod: HCNC,CPTII,S$GLB, | Performed by: INTERNAL MEDICINE

## 2020-05-18 PROCEDURE — 85025 COMPLETE CBC W/AUTO DIFF WBC: CPT | Mod: HCNC

## 2020-05-18 PROCEDURE — 1126F AMNT PAIN NOTED NONE PRSNT: CPT | Mod: HCNC,S$GLB,, | Performed by: INTERNAL MEDICINE

## 2020-05-18 PROCEDURE — 99214 PR OFFICE/OUTPT VISIT, EST, LEVL IV, 30-39 MIN: ICD-10-PCS | Mod: HCNC,S$GLB,, | Performed by: INTERNAL MEDICINE

## 2020-05-18 PROCEDURE — 1159F MED LIST DOCD IN RCRD: CPT | Mod: HCNC,S$GLB,, | Performed by: INTERNAL MEDICINE

## 2020-05-18 PROCEDURE — 36415 COLL VENOUS BLD VENIPUNCTURE: CPT | Mod: HCNC

## 2020-05-18 PROCEDURE — 99999 PR PBB SHADOW E&M-EST. PATIENT-LVL IV: CPT | Mod: PBBFAC,HCNC,, | Performed by: INTERNAL MEDICINE

## 2020-05-18 PROCEDURE — 3078F PR MOST RECENT DIASTOLIC BLOOD PRESSURE < 80 MM HG: ICD-10-PCS | Mod: HCNC,CPTII,S$GLB, | Performed by: INTERNAL MEDICINE

## 2020-05-18 PROCEDURE — 3075F PR MOST RECENT SYSTOLIC BLOOD PRESS GE 130-139MM HG: ICD-10-PCS | Mod: HCNC,CPTII,S$GLB, | Performed by: INTERNAL MEDICINE

## 2020-05-18 PROCEDURE — 82728 ASSAY OF FERRITIN: CPT | Mod: HCNC

## 2020-05-18 PROCEDURE — 3078F DIAST BP <80 MM HG: CPT | Mod: HCNC,CPTII,S$GLB, | Performed by: INTERNAL MEDICINE

## 2020-05-18 PROCEDURE — 83540 ASSAY OF IRON: CPT | Mod: HCNC

## 2020-05-18 PROCEDURE — 99999 PR PBB SHADOW E&M-EST. PATIENT-LVL IV: ICD-10-PCS | Mod: PBBFAC,HCNC,, | Performed by: INTERNAL MEDICINE

## 2020-05-18 PROCEDURE — 80053 COMPREHEN METABOLIC PANEL: CPT | Mod: HCNC

## 2020-05-18 PROCEDURE — 1101F PR PT FALLS ASSESS DOC 0-1 FALLS W/OUT INJ PAST YR: ICD-10-PCS | Mod: HCNC,CPTII,S$GLB, | Performed by: INTERNAL MEDICINE

## 2020-05-18 PROCEDURE — 3075F SYST BP GE 130 - 139MM HG: CPT | Mod: HCNC,CPTII,S$GLB, | Performed by: INTERNAL MEDICINE

## 2020-05-18 PROCEDURE — 1126F PR PAIN SEVERITY QUANTIFIED, NO PAIN PRESENT: ICD-10-PCS | Mod: HCNC,S$GLB,, | Performed by: INTERNAL MEDICINE

## 2020-05-18 NOTE — LETTER
May 18, 2020      Norma Nuñez MD  [address] [address] [address]           [facility]phrology  [address] [place] [address]  Phone: [phone]  Fax: [phone]          Patient: Kodi Ribeiro   MR Number: [id number]   YOB: 1950   Date of Visit: 5/18/2020       Dear Dr. Norma Nuñez:    Thank you for referring Kodi Ribeiro to me for evaluation. Attached you will find relevant portions of my assessment and plan of care.    If you have questions, please do not hesitate to call me. I look forward to following Kodi Ribeiro along with you.    Sincerely,    Brenton Jacobson MD    Enclosure  CC:  No Recipients    If you would like to receive this communication electronically, please contact [email or web address] or [phone] to request more information on [employer] Link access.    For providers and/or their staff who would like to refer a patient to [facility], please contact us through our one-stop-shop provider referral line, [facility], at [phone].    If you feel you have received this communication in error or would no longer like to receive these types of communications, please e-mail [email or web address]

## 2020-05-18 NOTE — PROGRESS NOTES
Subjective:       Patient ID: Kodi Ribeiro is a 70 y.o. White male who presents for follow-up evaluation of Acute Renal Failure and Chronic Kidney Disease    Hypertension   Associated symptoms include chest pain and shortness of breath. Pertinent negatives include no headaches, neck pain or palpitations.        Patient is a 70-year-old male with longstanding history of hypertension and peripheral arterial disease with multiple complications including AAA.  Patient had a AAA repair.  Also had complications of left-sided ureteric stricture.  Patient has a history of horseshoe kidney.  The 2 kidneys were  .  He chronically has left-sided ureteric stent replaced by Dr. Jin yearly.  Majority of his kidney function comes from the right kidney.  Left kidney seems to have minimal kidney function.  Has had complications of pyelonephritis on the left kidney.  Patient has severe peripheral artery disease status post a right BKA and left foot amputation.  He has quit smoking.  His baseline creatinine has been ranging between 1.4 and 1.5.  He has been seen in the nephrology division back in the year 2014.  Lately he is having chest pain off and on and is being considered for cardiac catheterization.  His creatinine was found to have gone up from 1 0.4-1.5 as baseline up to 2.6 earlier this month of January 2019.  Repeat labs show creatinine of 2.3.    January 2019 patient seen for acute kidney injury on chronic kidney disease.  Losartan stop.  Screening for ischemic nephropathy.  Avoid nonsteroidals.  Patient has been taking ibuprofen.    February 2019.  Ultrasound reviewed.  No definite of  renal artery stenosis.  Heavy proteinuria.  GFR 25% with creatinine of 2.4.  .  Vitamin-D 6, anemia      May 2020 patient comes back for follow-up.  Multiple episodes of acute kidney injury with small bowel obstruction obstructive nephropathy , CHF and infections.  All records reviewed.  Last creatinine we have is  "from April with a creatinine of 2.2.  Approximate GFR 20-25%.  Cystatin C showed GFR 23%. ( says iron pill causes SBO) ; left testicular hematoma in 4 2020 --is better     Review of Systems   Constitutional: Negative.  Negative for activity change, appetite change, chills, diaphoresis, fatigue and fever.   HENT: Negative.  Negative for congestion and trouble swallowing.    Eyes: Negative.    Respiratory: Positive for chest tightness and shortness of breath. Negative for cough and wheezing.    Cardiovascular: Positive for chest pain. Negative for palpitations and leg swelling.   Gastrointestinal: Negative.  Negative for abdominal distention, abdominal pain, nausea and vomiting.   Genitourinary: Negative.  Negative for decreased urine volume, difficulty urinating, dysuria, enuresis, flank pain, frequency, hematuria, penile swelling, scrotal swelling and urgency.   Musculoskeletal: Negative.  Negative for arthralgias, back pain, joint swelling and neck pain.   Skin: Negative for rash.   Neurological: Negative.  Negative for tremors, seizures and headaches.   Psychiatric/Behavioral: Negative.  Negative for confusion and sleep disturbance. The patient is not nervous/anxious.        Objective:   /60   Pulse 80   Ht 6' 1" (1.854 m)   Wt 75.3 kg (166 lb)   BMI 21.90 kg/m²      Physical Exam   Constitutional: He is oriented to person, place, and time. He appears well-developed and well-nourished. No distress.   HENT:   Head: Normocephalic.   Eyes: Pupils are equal, round, and reactive to light. EOM are normal.   Neck: Normal range of motion. Neck supple. No JVD present. No thyromegaly present.   Cardiovascular: Normal rate, regular rhythm, S1 normal, S2 normal and intact distal pulses. PMI is not displaced. Exam reveals no gallop and no friction rub.   Murmur heard.  Pulmonary/Chest: Effort normal. No respiratory distress. He has decreased breath sounds. He has no wheezes. He has no rales. He exhibits no " tenderness.   Abdominal: Soft. Bowel sounds are normal. He exhibits no distension and no mass. There is no hepatosplenomegaly. There is no tenderness. There is no rebound and no CVA tenderness. No hernia.   Positive abdominal bruit   Musculoskeletal: Normal range of motion. He exhibits no edema or tenderness.   Right BKA.  Left foot amputation.  Peripheral arterial disease   Lymphadenopathy:     He has no cervical adenopathy.   Neurological: He is alert and oriented to person, place, and time. He has normal reflexes. He is not disoriented. He displays normal reflexes. No cranial nerve deficit. He exhibits normal muscle tone. Coordination normal.   Skin: Skin is warm and dry. Capillary refill takes less than 2 seconds. No rash noted. No erythema.   Psychiatric: He has a normal mood and affect. His behavior is normal. Judgment and thought content normal.   Nursing note and vitals reviewed.        Lab Results   Component Value Date    CREATININE 2.2 (H) 04/27/2020    BUN 37 (H) 04/27/2020     04/27/2020    K 4.7 04/27/2020     04/27/2020    CO2 19 (L) 04/27/2020     Lab Results   Component Value Date    WBC 13.36 (H) 04/15/2020    HGB 8.0 (L) 04/15/2020    HCT 27.2 (L) 04/15/2020    MCV 91 04/15/2020     04/15/2020     Lab Results   Component Value Date    .0 (H) 02/26/2020    CALCIUM 9.1 04/27/2020    PHOS 2.7 03/17/2020     @RESUFAST(URICACID)    Assessment:    )    1. CKD stage G4/A3, GFR 15-29 and albumin creatinine ratio >300 mg/g    2. CKD (chronic kidney disease) stage 3, GFR 30-59 ml/min    3. Ureteral stricture, left    4. Horseshoe kidney    5. PVD (peripheral vascular disease)    6. Essential hypertension    7. Anemia of chronic renal failure, stage 4 (severe)    8. Persistent proteinuria    9. Hyperparathyroidism, secondary renal    10. Vitamin D deficiency    11. MARCELA (acute kidney injury)        Plan:         1.  Acute kidney injury on Chronic kidney disease stage 4:  Multiple  episodes of obstructive nephropathy and small bowel obstruction.  Repeat labs today.  Likely hypertensive nephropathy.  GFR 20- 25%.  Cystatin C shows GFR 23%.  Heavy proteinuria.  Likely due to hypertensive nephropathy.  Multiple complications of chronic kidney disease including vitamin-D deficiency, secondary hyperparathyroidism, anemia of chronic kidney disease as well.  Higher risk of complications with cardiac catheterization explained and discussed with details.  Avoid nonsteroidals.  Hold losartan one day before IV dye     3.  Nonfunctional left kidney with ureteric stricture status post stent placement in 2/2020 chronic obstruction . Follow-up with Dr. Jin    4.  Essential hypertension:  Blood pressure target would be 130.     5.  Heavy proteinuria most likely hypertensive nephropathy:  Due to advanced kidney failure and recurrent acute kidney injury I would avoid ARB    6.  Anemia due to chronic kidney disease:  S/p Consult Hematology , all records reviewed    7.  Secondary hyperparathyroidism with severe vitamin-D deficiency:  Start on ergocalciferol weekly.  Surveillance for hyperphosphatemia and hypocalcemia will be continued.          Follow-up 2-3 months

## 2020-05-19 ENCOUNTER — OFFICE VISIT (OUTPATIENT)
Dept: GASTROENTEROLOGY | Facility: CLINIC | Age: 70
End: 2020-05-19
Payer: MEDICARE

## 2020-05-19 VITALS
HEART RATE: 62 BPM | SYSTOLIC BLOOD PRESSURE: 140 MMHG | DIASTOLIC BLOOD PRESSURE: 70 MMHG | BODY MASS INDEX: 22 KG/M2 | WEIGHT: 166 LBS | HEIGHT: 73 IN

## 2020-05-19 DIAGNOSIS — K92.1 HEMATOCHEZIA: ICD-10-CM

## 2020-05-19 DIAGNOSIS — D64.9 ANEMIA, UNSPECIFIED TYPE: ICD-10-CM

## 2020-05-19 DIAGNOSIS — K44.9 HIATAL HERNIA WITH GERD: Primary | ICD-10-CM

## 2020-05-19 DIAGNOSIS — K21.9 HIATAL HERNIA WITH GERD: Primary | ICD-10-CM

## 2020-05-19 LAB — FERRITIN SERPL-MCNC: 71 NG/ML (ref 20–300)

## 2020-05-19 PROCEDURE — 1101F PT FALLS ASSESS-DOCD LE1/YR: CPT | Mod: HCNC,CPTII,S$GLB, | Performed by: PHYSICIAN ASSISTANT

## 2020-05-19 PROCEDURE — 99214 PR OFFICE/OUTPT VISIT, EST, LEVL IV, 30-39 MIN: ICD-10-PCS | Mod: HCNC,S$GLB,, | Performed by: PHYSICIAN ASSISTANT

## 2020-05-19 PROCEDURE — 99499 RISK ADDL DX/OHS AUDIT: ICD-10-PCS | Mod: HCNC,S$GLB,, | Performed by: PHYSICIAN ASSISTANT

## 2020-05-19 PROCEDURE — 3078F DIAST BP <80 MM HG: CPT | Mod: HCNC,CPTII,S$GLB, | Performed by: PHYSICIAN ASSISTANT

## 2020-05-19 PROCEDURE — 1126F AMNT PAIN NOTED NONE PRSNT: CPT | Mod: HCNC,S$GLB,, | Performed by: PHYSICIAN ASSISTANT

## 2020-05-19 PROCEDURE — 1101F PR PT FALLS ASSESS DOC 0-1 FALLS W/OUT INJ PAST YR: ICD-10-PCS | Mod: HCNC,CPTII,S$GLB, | Performed by: PHYSICIAN ASSISTANT

## 2020-05-19 PROCEDURE — 1126F PR PAIN SEVERITY QUANTIFIED, NO PAIN PRESENT: ICD-10-PCS | Mod: HCNC,S$GLB,, | Performed by: PHYSICIAN ASSISTANT

## 2020-05-19 PROCEDURE — 99999 PR PBB SHADOW E&M-EST. PATIENT-LVL IV: ICD-10-PCS | Mod: PBBFAC,HCNC,, | Performed by: PHYSICIAN ASSISTANT

## 2020-05-19 PROCEDURE — 99999 PR PBB SHADOW E&M-EST. PATIENT-LVL IV: CPT | Mod: PBBFAC,HCNC,, | Performed by: PHYSICIAN ASSISTANT

## 2020-05-19 PROCEDURE — 3078F PR MOST RECENT DIASTOLIC BLOOD PRESSURE < 80 MM HG: ICD-10-PCS | Mod: HCNC,CPTII,S$GLB, | Performed by: PHYSICIAN ASSISTANT

## 2020-05-19 PROCEDURE — 3077F PR MOST RECENT SYSTOLIC BLOOD PRESSURE >= 140 MM HG: ICD-10-PCS | Mod: HCNC,CPTII,S$GLB, | Performed by: PHYSICIAN ASSISTANT

## 2020-05-19 PROCEDURE — 1159F PR MEDICATION LIST DOCUMENTED IN MEDICAL RECORD: ICD-10-PCS | Mod: HCNC,S$GLB,, | Performed by: PHYSICIAN ASSISTANT

## 2020-05-19 PROCEDURE — 3077F SYST BP >= 140 MM HG: CPT | Mod: HCNC,CPTII,S$GLB, | Performed by: PHYSICIAN ASSISTANT

## 2020-05-19 PROCEDURE — 99499 UNLISTED E&M SERVICE: CPT | Mod: HCNC,S$GLB,, | Performed by: PHYSICIAN ASSISTANT

## 2020-05-19 PROCEDURE — 1159F MED LIST DOCD IN RCRD: CPT | Mod: HCNC,S$GLB,, | Performed by: PHYSICIAN ASSISTANT

## 2020-05-19 PROCEDURE — 99214 OFFICE O/P EST MOD 30 MIN: CPT | Mod: HCNC,S$GLB,, | Performed by: PHYSICIAN ASSISTANT

## 2020-05-19 NOTE — PROGRESS NOTES
Clinic Consult:  [facility] Consultation Note    Reason for Consult:  The primary encounter diagnosis was Hiatal hernia with GERD. Diagnoses of Anemia, unspecified type and Hematochezia were also pertinent to this visit.    PCP: Norma Nuñez   [address] [facility] / [place] [address]    CC: Rectal Bleeding (bright red)      HPI:  This is a 70 y.o. male here for evaluation of the above. History of chronic anemia, CKD stage 4, extensive cardiac history including CHF, AAA, arterial bypass. He has been referred by heme/onc for possible colonoscopy/EGD due to rectal bleeding and anemia. Patient has chronic anemia with what appears to be a baseline of about 7. He is reporting some rectal bleeding about a month ago along with some intermittent black stools and diarrhea. Taking iron supplementation at the time. He was instructed to come off of his Plavix at this time x 2 days. Patient was off of AC for 6 days total, but has not seen blood in the stool since then. Last colon/EGD in 2014 for rectal bleeding. Colonoscopy showed                        - Internal hemorrhoids.                        - Localized mild inflammation was found in the                         distal rectum. Biopsied.                        - Diverticulosis in the sigmoid colon.                        - Large lipoma in the descending colon.                        - A tattoo was seen in the ascending colon. The                         tattoo site appeared normal.                        - The examined portion of the ileum was normal.  EGD showed             - Normal esophagus.                        - Z-line regular, 35 cm from the incisors.                        - Hiatus hernia.                        - Normal examined duodenum.  Video capsule 2014:              - Normal duodenum.                        - Jejunal polyp(s).                        - Ileal ulcer.  Denies any complaints today other than intermittent reflux accompanied by  chest discomfort. No BRBPR in 3 weeks.     Review of Systems   Constitutional: Positive for unexpected weight change (but has been sick and in and out of hospital. Has been holding lately.). Negative for appetite change, chills, diaphoresis, fatigue and fever.   HENT: Negative for trouble swallowing.    Respiratory: Negative for cough, choking, chest tightness and shortness of breath (Has home oxygen but has not had to use it.).    Cardiovascular: Positive for chest pain (begins at lower chest and radiates up. Doesn't know if heart or acid indigestion). Negative for palpitations.   Gastrointestinal: Positive for anal bleeding and blood in stool. Negative for abdominal distention, abdominal pain, constipation, diarrhea, nausea and vomiting.   Genitourinary: Negative for dysuria.   Musculoskeletal: Negative for back pain.   Skin: Negative for rash.   Neurological: Negative for dizziness, weakness and light-headedness.   Psychiatric/Behavioral: Negative for confusion and dysphoric mood. The patient is not nervous/anxious.         Medical History:   Past Medical History:   Diagnosis Date    Acute on chronic congestive heart failure 1/13/2020    Acute respiratory failure with hypoxia 1/14/2020    Analgesic nephropathy     Anemia     AP (angina pectoris) 1/11/2019    Arthritis     Colon polyp     Repeat colonoscopy due in 9/14    COPD exacerbation 2/6/2020    Coronary artery disease     Diverticulosis     colonoscopy 2/21/2014    Dysthymia 2/13/2020    Encounter for blood transfusion     GERD (gastroesophageal reflux disease)     Hemorrhoids     colonoscopy 2/21/2014    Horseshoe kidney     Hyperglycemia 3/17/2014    Hyperlipidemia     Hypertension     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    Late complications of amputation stump     rseolved with further amputation( MRSA then none since 2014)    Lipoma of colon     colonoscopy 2/21/2014    Myocardial infarction     per patient 2000 & 9/2012     Peripheral vascular disease     Phantom limb syndrome     patient reports only intermittent not problematic, not worsening    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    Spinal cord disease     L4L5 disc    Stroke     Tobacco dependence     resolved    Ureteral stent retained        Surgical History:   Past Surgical History:   Procedure Laterality Date    ABDOMINAL AORTIC ANEURYSM REPAIR      ABDOMINAL AORTIC ANEURYSM REPAIR  1996/2014    AMPUTATION, LOWER LIMB      AORTA - BILATERAL FEMORAL ARTERY BYPASS GRAFT  2014    Left and right leg    CORONARY ANGIOPLASTY WITH STENT PLACEMENT  2000    Three placed in heart    CYSTOSCOPY W/ RETROGRADES Left 5/29/2018    Procedure: CYSTOSCOPY, WITH RETROGRADE PYELOGRAM;  Surgeon: Scooter Jin IV, MD;  [facility]: [facility];  Service: Urology;  Laterality: Left;    CYSTOSCOPY W/ URETERAL STENT PLACEMENT Left 5/29/2018    Procedure: CYSTOSCOPY, WITH URETERAL STENT INSERTION;  Surgeon: Scooter Jin IV, MD;  Location: [facility] OR;  Service: Urology;  Laterality: Left;    CYSTOSCOPY W/ URETERAL STENT PLACEMENT Left 2/4/2020    Procedure: CYSTOSCOPY, WITH URETERAL STENT INSERTION;  Surgeon: Scooter Jin IV, MD;  Location: [facility] OR;  Service: Urology;  Laterality: Left;    CYSTOSCOPY W/ URETERAL STENT REMOVAL Left 5/29/2018    Procedure: CYSTOSCOPY, WITH URETERAL STENT REMOVAL;  Surgeon: Scooter Jin IV, MD;  Location: [facility] OR;  Service: Urology;  Laterality: Left;    CYSTOSCOPY W/ URETERAL STENT REMOVAL Left 2/4/2020    Procedure: CYSTOSCOPY, WITH URETERAL STENT REMOVAL;  Surgeon: Scooter Jin IV, MD;  Location: [facility];  Service: Urology;  Laterality: Left;    FOOT AMPUTATION THROUGH METATARSAL  1996    left    FOOT SURGERY Bilateral 1980's    per patient multiple toe amputations prior to.  partial foot amputation:first great toe then other toes     KIDNEY SURGERY  2014    per patient separation of horseshoe kidney @ time of AAA repair    LEFT  HEART CATHETERIZATION Left 3/7/2019    Procedure: CATHETERIZATION, HEART, LEFT;  Surgeon: Adriel Boone MD;  Location[facility] CATH LAB;  Service: Cardiology;  Laterality: Left;  630 admit for IV hydration  10am start    LUNG LOBECTOMY Right 1970s    per patient not cancer    right below knee amputation   (approx)    SMALL INTESTINE SURGERY  2014    per patient partial @ time of aaa repair  not small bowel - large bowel bowel compromised bythtwe AAAbowel    TONSILLECTOMY   aprox    URETERAL STENT PLACEMENT Left     annually replaced since  or so  Dr Jin       Family History:    Family History   Problem Relation Age of Onset    Cancer Mother         lung    COPD Mother     Heart disease Father         MI but per patient bc of old age    Diabetes Daughter     Eczema Neg Hx     Lupus Neg Hx     Psoriasis Neg Hx     Melanoma Neg Hx     Kidney disease Neg Hx     Stroke Neg Hx     Mental illness Neg Hx     Mental retardation Neg Hx     Hypertension Neg Hx     Hyperlipidemia Neg Hx     Drug abuse Neg Hx     Alcohol abuse Neg Hx     Depression Neg Hx        Social History:   Social History     Socioeconomic History    Marital status:      Spouse name: Laury    Number of children: 2    Years of education: Not on file    Highest education level: Not on file   Occupational History    Occupation: Retired      Comment: [employer]   Social Needs    Financial resource strain: Not on file    Food insecurity:     Worry: Not on file     Inability: Not on file    Transportation needs:     Medical: Not on file     Non-medical: Not on file   Tobacco Use    Smoking status: Former Smoker     Packs/day: 1.00     Years: 15.00     Pack years: 15.00     Last attempt to quit: 2009     Years since quittin.3    Smokeless tobacco: Never Used   Substance and Sexual Activity    Alcohol use: No    Drug use: No     Comment: Is on prescription opiod, no non  prescribed use    Sexual activity: Not Currently     Partners: Female   Lifestyle    Physical activity:     Days per week: Not on file     Minutes per session: Not on file    Stress: Not on file   Relationships    Social connections:     Talks on phone: Not on file     Gets together: Not on file     Attends Cheondo[employer] service: Not on file     Active member of club or organization: Not on file     Attends meetings of clubs or organizations: Not on file     Relationship status: Not on file   Other Topics Concern    Not on file   Social History Narrative     . 4 children alive and well. 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  Still drives. Does not have a Living Will.       Allergies:   Review of patient's allergies indicates:   Allergen Reactions    Morphine Itching       Home Medications:   Current Outpatient Medications on File Prior to Visit   Medication Sig Dispense Refill    albuterol-ipratropium (DUO-NEB) 2.5 mg-0.5 mg/3 mL nebulizer solution Take 3 mLs by nebulization every 8 (eight) hours as needed for Wheezing or Shortness of Breath. Rescue 90 mL 0    amLODIPine (NORVASC) 10 MG tablet TAKE 1 TABLET EVERY DAY 90 tablet 3    atorvastatin (LIPITOR) 40 MG tablet Take 1 tablet (40 mg total) by mouth once daily. 90 tablet 3    carvedilol (COREG) 25 MG tablet Take 1 tablet (25 mg total) by mouth 2 (two) times daily with meals. 60 tablet 11    cetirizine (ZYRTEC) 10 MG tablet Take 10 mg by mouth once daily.      clopidogreL (PLAVIX) 75 mg tablet TAKE 1 TABLET EVERY DAY 90 tablet 3    folic acid-vit B6-vit B12 2.5-25-2 mg (FOLBIC OR EQUIV) 2.5-25-2 mg Tab Take 1 tablet by mouth once daily. 90 tablet 3    furosemide (LASIX) 20 MG tablet Take two tab on Mon/Wed/Friday and one tab on Tues/Thurs/ Sat/Sunday 124 tablet 3    hydrALAZINE (APRESOLINE) 25 MG tablet Take 1 tablet (25 mg total) by mouth every 12 (twelve) hours. 180 tablet 3    isosorbide mononitrate  "(IMDUR) 120 MG 24 hr tablet Take 1 tablet by mouth once daily.      mupirocin (BACTROBAN) 2 % ointment Apply topically 3 (three) times daily. 1 Tube 2    omeprazole (PRILOSEC) 20 MG capsule TAKE 1 CAPSULE TWICE DAILY 180 capsule 3    triamcinolone acetonide 0.1% (KENALOG) 0.1 % cream Apply topically 2 (two) times daily. 1 Tube 3    aspirin (ECOTRIN) 81 MG EC tablet Take 1 tablet (81 mg total) by mouth once daily.  0    OXYGEN-AIR DELIVERY SYSTEMS MISC by Misc.(Non-Drug; Combo Route) route.      sertraline (ZOLOFT) 50 MG tablet Take 1 tablet (50 mg total) by mouth once daily. 90 tablet 11     No current facility-administered medications on file prior to visit.        Physical Exam:  BP (!) 140/70   Pulse 62   Ht 6' 1" (1.854 m)   Wt 75.3 kg (166 lb 0.1 oz)   BMI 21.90 kg/m²   Body mass index is 21.9 kg/m².  Physical Exam   Constitutional: He is oriented to person, place, and time. He appears well-developed and well-nourished. No distress.   HENT:   Head: Normocephalic and atraumatic.   Eyes: Conjunctivae and EOM are normal. No scleral icterus.   Neck: Normal range of motion.   Cardiovascular: Normal rate and regular rhythm.   Pulmonary/Chest: Effort normal and breath sounds normal. No respiratory distress.   Abdominal: Soft. Bowel sounds are normal. He exhibits no distension and no mass. There is no tenderness. There is no guarding.   Musculoskeletal: Normal range of motion.   Amputation right leg. Prosthetic in place.   Neurological: He is alert and oriented to person, place, and time.   Skin: Skin is warm and dry. He is not diaphoretic. No erythema. No pallor.   Psychiatric: He has a normal mood and affect. His behavior is normal. Judgment and thought content normal.         Labs: Pertinent labs reviewed.  Endoscopy:   CRC Screenin  Anticoagulation: Plavix    Assessment:  1. Hiatal hernia with GERD    2. Anemia, unspecified type    3. Hematochezia         Recommendations:  -Cardiac clearance " from Dr. Boone. Colonoscopy may be necessary, as patient is due for repeat from 2014. Patient on AC with extensive cards history.  -Discuss further with GI physician, as patient has numerous comorbidities including CKD, CHF, PVD.  -Discussed going to ER immediately with any worsening signs or symptoms including large blood in the stool, SOB, severe weakness or dizziness.    Hiatal hernia with GERD    Anemia, unspecified type  -     Ambulatory referral/consult to Gastroenterology    Hematochezia  -     Ambulatory referral/consult to Gastroenterology        No follow-ups on file.    Thank you so much for allowing me to participate in the care of Kodi Atkins PA-C

## 2020-05-19 NOTE — LETTER
May 21, 2020      Lydia Rodriguez MD  [address]  [place] [address]           Formerly [facility]  [address] [place] [id number]  Phone: [phone]  Fax: [phone]          Patient: Kodi Ribeiro   MR Number: [id number]   YOB: 1950   Date of Visit: 5/19/2020       Dear Dr. Lydia Rodriguez:    Thank you for referring Kodi Ribeiro to me for evaluation. Attached you will find relevant portions of my assessment and plan of care.    If you have questions, please do not hesitate to call me. I look forward to following Kodi Ribeiro along with you.    Sincerely,    Yasmin Atkins PA-C    Enclosure  CC:  No Recipients    If you would like to receive this communication electronically, please contact [email or web address] or [phone] to request more information on [employer] Link access.    For providers and/or their staff who would like to refer a patient to [facility], please contact us through our one-stop-shop provider referral line, [facility], at [phone].    If you feel you have received this communication in error or would no longer like to receive these types of communications, please e-mail [email or web address]

## 2020-05-19 NOTE — H&P (VIEW-ONLY)
Clinic Consult:  [facility] Consultation Note    Reason for Consult:  The primary encounter diagnosis was Hiatal hernia with GERD. Diagnoses of Anemia, unspecified type and Hematochezia were also pertinent to this visit.    PCP: Norma Nuñez   [id number] [facility] / [place] [id number]    CC: Rectal Bleeding (bright red)      HPI:  This is a 70 y.o. male here for evaluation of the above. History of chronic anemia, CKD stage 4, extensive cardiac history including CHF, AAA, arterial bypass. He has been referred by heme/onc for possible colonoscopy/EGD due to rectal bleeding and anemia. Patient has chronic anemia with what appears to be a baseline of about 7. He is reporting some rectal bleeding about a month ago along with some intermittent black stools and diarrhea. Taking iron supplementation at the time. He was instructed to come off of his Plavix at this time x 2 days. Patient was off of AC for 6 days total, but has not seen blood in the stool since then. Last colon/EGD in 2014 for rectal bleeding. Colonoscopy showed                        - Internal hemorrhoids.                        - Localized mild inflammation was found in the                         distal rectum. Biopsied.                        - Diverticulosis in the sigmoid colon.                        - Large lipoma in the descending colon.                        - A tattoo was seen in the ascending colon. The                         tattoo site appeared normal.                        - The examined portion of the ileum was normal.  EGD showed             - Normal esophagus.                        - Z-line regular, 35 cm from the incisors.                        - Hiatus hernia.                        - Normal examined duodenum.  Video capsule 2014:              - Normal duodenum.                        - Jejunal polyp(s).                        - Ileal ulcer.  Denies any complaints today other than intermittent reflux accompanied by  chest discomfort. No BRBPR in 3 weeks.     Review of Systems   Constitutional: Positive for unexpected weight change (but has been sick and in and out of hospital. Has been holding lately.). Negative for appetite change, chills, diaphoresis, fatigue and fever.   HENT: Negative for trouble swallowing.    Respiratory: Negative for cough, choking, chest tightness and shortness of breath (Has home oxygen but has not had to use it.).    Cardiovascular: Positive for chest pain (begins at lower chest and radiates up. Doesn't know if heart or acid indigestion). Negative for palpitations.   Gastrointestinal: Positive for anal bleeding and blood in stool. Negative for abdominal distention, abdominal pain, constipation, diarrhea, nausea and vomiting.   Genitourinary: Negative for dysuria.   Musculoskeletal: Negative for back pain.   Skin: Negative for rash.   Neurological: Negative for dizziness, weakness and light-headedness.   Psychiatric/Behavioral: Negative for confusion and dysphoric mood. The patient is not nervous/anxious.         Medical History:   Past Medical History:   Diagnosis Date    Acute on chronic congestive heart failure 1/13/2020    Acute respiratory failure with hypoxia 1/14/2020    Analgesic nephropathy     Anemia     AP (angina pectoris) 1/11/2019    Arthritis     Colon polyp     Repeat colonoscopy due in 9/14    COPD exacerbation 2/6/2020    Coronary artery disease     Diverticulosis     colonoscopy 2/21/2014    Dysthymia 2/13/2020    Encounter for blood transfusion     GERD (gastroesophageal reflux disease)     Hemorrhoids     colonoscopy 2/21/2014    Horseshoe kidney     Hyperglycemia 3/17/2014    Hyperlipidemia     Hypertension     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    Late complications of amputation stump     rseolved with further amputation( MRSA then none since 2014)    Lipoma of colon     colonoscopy 2/21/2014    Myocardial infarction     per patient 2000 & 9/2012     Peripheral vascular disease     Phantom limb syndrome     patient reports only intermittent not problematic, not worsening    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    Spinal cord disease     L4L5 disc    Stroke     Tobacco dependence     resolved    Ureteral stent retained        Surgical History:   Past Surgical History:   Procedure Laterality Date    ABDOMINAL AORTIC ANEURYSM REPAIR      ABDOMINAL AORTIC ANEURYSM REPAIR  1996/2014    AMPUTATION, LOWER LIMB      AORTA - BILATERAL FEMORAL ARTERY BYPASS GRAFT  2014    Left and right leg    CORONARY ANGIOPLASTY WITH STENT PLACEMENT  2000    Three placed in heart    CYSTOSCOPY W/ RETROGRADES Left 5/29/2018    Procedure: CYSTOSCOPY, WITH RETROGRADE PYELOGRAM;  Surgeon: Scooter Jin IV, MD;  Location: [facility];  Service: Urology;  Laterality: Left;    CYSTOSCOPY W/ URETERAL STENT PLACEMENT Left 5/29/2018    Procedure: CYSTOSCOPY, WITH URETERAL STENT INSERTION;  Surgeon: Scooter Jin IV, MD;  Location: [facility] OR;  Service: Urology;  Laterality: Left;    CYSTOSCOPY W/ URETERAL STENT PLACEMENT Left 2/4/2020    Procedure: CYSTOSCOPY, WITH URETERAL STENT INSERTION;  Surgeon: Scooter Jin IV, MD;  Location: [facility] OR;  Service: Urology;  Laterality: Left;    CYSTOSCOPY W/ URETERAL STENT REMOVAL Left 5/29/2018    Procedure: CYSTOSCOPY, WITH URETERAL STENT REMOVAL;  Surgeon: Scooter Jin IV, MD;  Location: [facility] OR;  Service: Urology;  Laterality: Left;    CYSTOSCOPY W/ URETERAL STENT REMOVAL Left 2/4/2020    Procedure: CYSTOSCOPY, WITH URETERAL STENT REMOVAL;  Surgeon: Scooter Jin IV, MD;  Location: [facility];  Service: Urology;  Laterality: Left;    FOOT AMPUTATION THROUGH METATARSAL  1996    left    FOOT SURGERY Bilateral 1980's    per patient multiple toe amputations prior to.  partial foot amputation:first great toe then other toes     KIDNEY SURGERY  2014    per patient separation of horseshoe kidney @ time of AAA repair    LEFT  HEART CATHETERIZATION Left 3/7/2019    Procedure: CATHETERIZATION, HEART, LEFT;  Surgeon: Adriel Boone MD;  Location: [facility] CATH LAB;  Service: Cardiology;  Laterality: Left;  630 admit for IV hydration  10am start    LUNG LOBECTOMY Right 1970s    per patient not cancer    right below knee amputation   (approx)    SMALL INTESTINE SURGERY  2014    per patient partial @ time of aaa repair  not small bowel - large bowel bowel compromised bythtwe AAAbowel    TONSILLECTOMY   aprox    URETERAL STENT PLACEMENT Left     annually replaced since  or so  Dr Jin       Family History:    Family History   Problem Relation Age of Onset    Cancer Mother         lung    COPD Mother     Heart disease Father         MI but per patient bc of old age    Diabetes Daughter     Eczema Neg Hx     Lupus Neg Hx     Psoriasis Neg Hx     Melanoma Neg Hx     Kidney disease Neg Hx     Stroke Neg Hx     Mental illness Neg Hx     Mental retardation Neg Hx     Hypertension Neg Hx     Hyperlipidemia Neg Hx     Drug abuse Neg Hx     Alcohol abuse Neg Hx     Depression Neg Hx        Social History:   Social History     Socioeconomic History    Marital status:      Spouse name: Laury    Number of children: 2    Years of education: Not on file    Highest education level: Not on file   Occupational History    Occupation: Retired      Comment: [employer]   Social Needs    Financial resource strain: Not on file    Food insecurity:     Worry: Not on file     Inability: Not on file    Transportation needs:     Medical: Not on file     Non-medical: Not on file   Tobacco Use    Smoking status: Former Smoker     Packs/day: 1.00     Years: 15.00     Pack years: 15.00     Last attempt to quit: 2009     Years since quittin.3    Smokeless tobacco: Never Used   Substance and Sexual Activity    Alcohol use: No    Drug use: No     Comment: Is on prescription opiod, no non  prescribed use    Sexual activity: Not Currently     Partners: Female   Lifestyle    Physical activity:     Days per week: Not on file     Minutes per session: Not on file    Stress: Not on file   Relationships    Social connections:     Talks on phone: Not on file     Gets together: Not on file     Attends Christianity service: Not on file     Active member of club or organization: Not on file     Attends meetings of clubs or organizations: Not on file     Relationship status: Not on file   Other Topics Concern    Not on file   Social History Narrative     . 4 children alive and well. 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  Still drives. Does not have a Living Will.       Allergies:   Review of patient's allergies indicates:   Allergen Reactions    Morphine Itching       Home Medications:   Current Outpatient Medications on File Prior to Visit   Medication Sig Dispense Refill    albuterol-ipratropium (DUO-NEB) 2.5 mg-0.5 mg/3 mL nebulizer solution Take 3 mLs by nebulization every 8 (eight) hours as needed for Wheezing or Shortness of Breath. Rescue 90 mL 0    amLODIPine (NORVASC) 10 MG tablet TAKE 1 TABLET EVERY DAY 90 tablet 3    atorvastatin (LIPITOR) 40 MG tablet Take 1 tablet (40 mg total) by mouth once daily. 90 tablet 3    carvedilol (COREG) 25 MG tablet Take 1 tablet (25 mg total) by mouth 2 (two) times daily with meals. 60 tablet 11    cetirizine (ZYRTEC) 10 MG tablet Take 10 mg by mouth once daily.      clopidogreL (PLAVIX) 75 mg tablet TAKE 1 TABLET EVERY DAY 90 tablet 3    folic acid-vit B6-vit B12 2.5-25-2 mg (FOLBIC OR EQUIV) 2.5-25-2 mg Tab Take 1 tablet by mouth once daily. 90 tablet 3    furosemide (LASIX) 20 MG tablet Take two tab on Mon/Wed/Friday and one tab on Tues/Thurs/ Sat/Sunday 124 tablet 3    hydrALAZINE (APRESOLINE) 25 MG tablet Take 1 tablet (25 mg total) by mouth every 12 (twelve) hours. 180 tablet 3    isosorbide mononitrate  "(IMDUR) 120 MG 24 hr tablet Take 1 tablet by mouth once daily.      mupirocin (BACTROBAN) 2 % ointment Apply topically 3 (three) times daily. 1 Tube 2    omeprazole (PRILOSEC) 20 MG capsule TAKE 1 CAPSULE TWICE DAILY 180 capsule 3    triamcinolone acetonide 0.1% (KENALOG) 0.1 % cream Apply topically 2 (two) times daily. 1 Tube 3    aspirin (ECOTRIN) 81 MG EC tablet Take 1 tablet (81 mg total) by mouth once daily.  0    OXYGEN-AIR DELIVERY SYSTEMS MISC by Misc.(Non-Drug; Combo Route) route.      sertraline (ZOLOFT) 50 MG tablet Take 1 tablet (50 mg total) by mouth once daily. 90 tablet 11     No current facility-administered medications on file prior to visit.        Physical Exam:  BP (!) 140/70   Pulse 62   Ht 6' 1" (1.854 m)   Wt 75.3 kg (166 lb 0.1 oz)   BMI 21.90 kg/m²   Body mass index is 21.9 kg/m².  Physical Exam   Constitutional: He is oriented to person, place, and time. He appears well-developed and well-nourished. No distress.   HENT:   Head: Normocephalic and atraumatic.   Eyes: Conjunctivae and EOM are normal. No scleral icterus.   Neck: Normal range of motion.   Cardiovascular: Normal rate and regular rhythm.   Pulmonary/Chest: Effort normal and breath sounds normal. No respiratory distress.   Abdominal: Soft. Bowel sounds are normal. He exhibits no distension and no mass. There is no tenderness. There is no guarding.   Musculoskeletal: Normal range of motion.   Amputation right leg. Prosthetic in place.   Neurological: He is alert and oriented to person, place, and time.   Skin: Skin is warm and dry. He is not diaphoretic. No erythema. No pallor.   Psychiatric: He has a normal mood and affect. His behavior is normal. Judgment and thought content normal.         Labs: Pertinent labs reviewed.  Endoscopy:   CRC Screenin  Anticoagulation: Plavix    Assessment:  1. Hiatal hernia with GERD    2. Anemia, unspecified type    3. Hematochezia         Recommendations:  -Cardiac clearance " from Dr. Boone. Colonoscopy may be necessary, as patient is due for repeat from 2014. Patient on AC with extensive cards history.  -Discuss further with GI physician, as patient has numerous comorbidities including CKD, CHF, PVD.  -Discussed going to ER immediately with any worsening signs or symptoms including large blood in the stool, SOB, severe weakness or dizziness.    Hiatal hernia with GERD    Anemia, unspecified type  -     Ambulatory referral/consult to Gastroenterology    Hematochezia  -     Ambulatory referral/consult to Gastroenterology        No follow-ups on file.    Thank you so much for allowing me to participate in the care of Kodi Atkins PA-C

## 2020-05-20 ENCOUNTER — OFFICE VISIT (OUTPATIENT)
Dept: HEMATOLOGY/ONCOLOGY | Facility: CLINIC | Age: 70
End: 2020-05-20
Payer: MEDICARE

## 2020-05-20 ENCOUNTER — TELEPHONE (OUTPATIENT)
Dept: CARDIOLOGY | Facility: HOSPITAL | Age: 70
End: 2020-05-20

## 2020-05-20 ENCOUNTER — TELEPHONE (OUTPATIENT)
Dept: GASTROENTEROLOGY | Facility: CLINIC | Age: 70
End: 2020-05-20

## 2020-05-20 DIAGNOSIS — D50.0 IRON DEFICIENCY ANEMIA DUE TO CHRONIC BLOOD LOSS: ICD-10-CM

## 2020-05-20 DIAGNOSIS — N18.4 ANEMIA OF CHRONIC RENAL FAILURE, STAGE 4 (SEVERE): ICD-10-CM

## 2020-05-20 DIAGNOSIS — D63.1 ANEMIA OF CHRONIC RENAL FAILURE, STAGE 4 (SEVERE): ICD-10-CM

## 2020-05-20 DIAGNOSIS — K92.1 HEMATOCHEZIA: Primary | ICD-10-CM

## 2020-05-20 PROCEDURE — 99442 PR PHYSICIAN TELEPHONE EVALUATION 11-20 MIN: CPT | Mod: HCNC,95,, | Performed by: INTERNAL MEDICINE

## 2020-05-20 PROCEDURE — 99442 PR PHYSICIAN TELEPHONE EVALUATION 11-20 MIN: ICD-10-PCS | Mod: HCNC,95,, | Performed by: INTERNAL MEDICINE

## 2020-05-20 RX ORDER — HEPARIN 100 UNIT/ML
500 SYRINGE INTRAVENOUS
Status: CANCELLED | OUTPATIENT
Start: 2020-09-01

## 2020-05-20 RX ORDER — SODIUM CHLORIDE 0.9 % (FLUSH) 0.9 %
10 SYRINGE (ML) INJECTION
Status: CANCELLED | OUTPATIENT
Start: 2020-09-01

## 2020-05-20 RX ORDER — SODIUM CHLORIDE 0.9 % (FLUSH) 0.9 %
10 SYRINGE (ML) INJECTION
Status: CANCELLED | OUTPATIENT
Start: 2020-05-27

## 2020-05-20 RX ORDER — HEPARIN 100 UNIT/ML
500 SYRINGE INTRAVENOUS
Status: CANCELLED | OUTPATIENT
Start: 2020-05-27

## 2020-05-20 NOTE — PROGRESS NOTES
Established Patient - Audio Only Telehealth Visit     The patient location is: home  The chief complaint leading to consultation is: iron deiciency anemia   Visit type: Virtual visit with audio only (telephone)  Total time spent with patient: 12 minutes       The reason for the audio only service rather than synchronous audio and video virtual visit was related to technical difficulties or patient preference/necessity.     Each patient to whom I provide medical services by telemedicine is:  (1) informed of the relationship between the physician and patient and the respective role of any other health care provider with respect to management of the patient; and (2) notified that they may decline to receive medical services by telemedicine and may withdraw from such care at any time. Patient verbally consented to receive this service via voice-only telephone call.      HPI:     I am following up with Mr. Ribeiro via tele visit due to COVID-19.  He has been followed in our clinic for anemia previously by Breana Carreon NP in 3/5/19.      I reviewed his medical history which is notable for peripheral vascular disease status post bypass surgery, AAA, CHF.      He has been followed in Hematology Clinic for anemia.  Prior workup has included bone marrow biopsy in 2012 which showed: 30% cellularity.  No significant dysplasia.  Stainable iron is increased without increased ring sideroblasts.  Megakaryocytes are seen and adequate in number.  Normal male karyotype.  FISH without abnormality.  He notes history of anemia, previously diagnosed with iron deficiency and treated with IV iron therapy February 2019.  He has had 3 PRBC transfusions over the last several months since 1/2020.  Most recent hospitalization March 2020 with small-bowel obstruction treated conservatively with NG tube and during that admission noted to have hemoglobin 6.5 given 1 unit PRBC transfusion.  Over the last several months he has also had notable  worsening in kidney function.    During follow-up to me today he notes that prior hematochezia has improved and denies any other evidence of bleeding currently.  He has follow-up with GI and is planned for scope not yet scheduled date.  He is on his anticoagulation.  He notes prior IV iron therapy but denies allergy or other reaction.    Lab Results   Component Value Date    WBC 6.75 05/18/2020    HGB 7.3 (L) 05/18/2020    HCT 25.8 (L) 05/18/2020    MCV 93 05/18/2020     05/18/2020       Lab Results   Component Value Date    IRON 27 (L) 05/18/2020    TIBC 364 05/18/2020    FERRITIN 71 05/18/2020         Assessment and plan:      # Anemia:  Repeat labs concerning for further anemia and depletion of iron stores in the setting of hematochezia.  He notes being planned with GI for EGD and colonoscopy.  I have advised IV iron therapy and discuss risks and benefits and he is amenable to proceeding.  Will have my nurse call to arrange IV iron therapy and I will follow up with him in couple months       This service was not originating from a related E/M service provided within the previous 7 days nor will  to an E/M service or procedure within the next 24 hours or my soonest available appointment.  Prevailing standard of care was able to be met in this audio-only visit.

## 2020-05-20 NOTE — TELEPHONE ENCOUNTER
Dr. Boone,     I saw Mr. Ribeiro in clinic for chronic anemia and also hematochezia. He is overdue for colonoscopy. He has an extensive heart history and wanted your thoughts on clearance for possible procedure. It appears he saw you initially, but was referred to [facility] who has not referred him back to general cards. His hgb is low, but he is also in kidney failure.    Any thoughts would be appreciated.    Thank you,  Yasmin Atkins PA-C

## 2020-05-20 NOTE — TELEPHONE ENCOUNTER
Pt is at increased risk for CV complications for colonoscopy.  Caution advised with sedation.    Dr Boone

## 2020-05-21 ENCOUNTER — CARE AT HOME (OUTPATIENT)
Dept: HOME HEALTH SERVICES | Facility: CLINIC | Age: 70
End: 2020-05-21
Payer: MEDICARE

## 2020-05-21 VITALS
TEMPERATURE: 98 F | RESPIRATION RATE: 18 BRPM | SYSTOLIC BLOOD PRESSURE: 146 MMHG | DIASTOLIC BLOOD PRESSURE: 64 MMHG | HEART RATE: 70 BPM | OXYGEN SATURATION: 98 %

## 2020-05-21 DIAGNOSIS — Z09 FOLLOW UP: Primary | ICD-10-CM

## 2020-05-21 PROCEDURE — 1159F PR MEDICATION LIST DOCUMENTED IN MEDICAL RECORD: ICD-10-PCS | Mod: S$GLB,,, | Performed by: NURSE PRACTITIONER

## 2020-05-21 PROCEDURE — 1101F PT FALLS ASSESS-DOCD LE1/YR: CPT | Mod: CPTII,S$GLB,, | Performed by: NURSE PRACTITIONER

## 2020-05-21 PROCEDURE — 3078F PR MOST RECENT DIASTOLIC BLOOD PRESSURE < 80 MM HG: ICD-10-PCS | Mod: CPTII,S$GLB,, | Performed by: NURSE PRACTITIONER

## 2020-05-21 PROCEDURE — 99348 HOME/RES VST EST LOW MDM 30: CPT | Mod: ,,, | Performed by: NURSE PRACTITIONER

## 2020-05-21 PROCEDURE — 1101F PR PT FALLS ASSESS DOC 0-1 FALLS W/OUT INJ PAST YR: ICD-10-PCS | Mod: CPTII,S$GLB,, | Performed by: NURSE PRACTITIONER

## 2020-05-21 PROCEDURE — 1126F AMNT PAIN NOTED NONE PRSNT: CPT | Mod: S$GLB,,, | Performed by: NURSE PRACTITIONER

## 2020-05-21 PROCEDURE — 1126F PR PAIN SEVERITY QUANTIFIED, NO PAIN PRESENT: ICD-10-PCS | Mod: S$GLB,,, | Performed by: NURSE PRACTITIONER

## 2020-05-21 PROCEDURE — 1159F MED LIST DOCD IN RCRD: CPT | Mod: S$GLB,,, | Performed by: NURSE PRACTITIONER

## 2020-05-21 PROCEDURE — 3077F PR MOST RECENT SYSTOLIC BLOOD PRESSURE >= 140 MM HG: ICD-10-PCS | Mod: CPTII,S$GLB,, | Performed by: NURSE PRACTITIONER

## 2020-05-21 PROCEDURE — 99348 PR HOME VISIT,ESTAB PATIENT,LEVEL II: ICD-10-PCS | Mod: ,,, | Performed by: NURSE PRACTITIONER

## 2020-05-21 PROCEDURE — 3078F DIAST BP <80 MM HG: CPT | Mod: CPTII,S$GLB,, | Performed by: NURSE PRACTITIONER

## 2020-05-21 PROCEDURE — 3077F SYST BP >= 140 MM HG: CPT | Mod: CPTII,S$GLB,, | Performed by: NURSE PRACTITIONER

## 2020-05-21 RX ORDER — NITROGLYCERIN 0.6 MG/1
0.6 TABLET SUBLINGUAL EVERY 5 MIN PRN
Qty: 30 TABLET | Refills: 1 | Status: SHIPPED | OUTPATIENT
Start: 2020-05-21 | End: 2022-01-01

## 2020-05-21 NOTE — PROGRESS NOTES
[facility]  [facility] Visit    Visit Date: 5/21/2020  Encounter Provider: Porter Salinas NP  PCP:  Norma Nuñez MD    Subjective:      Patient ID: Kodi Ribeiro is a 70 y.o. male.    Consult Requested By:  Porter Salinas  Reason for Consult: Medical Follow-Up and Medication Review    The patient is being seen at home due to physical debility that presents a taxing effort to leave the home, to mitigate high risk of hospital readmission or due to the limited availability of reliable or safe options for transportation to the point of access to the provider. Prior to treatment on this visit the chart was reviewed and patient consent was obtained.    Chief Complaint: Follow-up for chronic medical conditions and medication revie    Today:  Mr. Kodi Ribeiro is a 70 y.o. male is being seen today for follow-up for chronic medical conditions and medication review. Kodi presents at baseline state of health as reported by patient and caregiver. VSS. Denies any acute issues, concerns or complaints to address on today's visit. Reports taking all medications as prescribed. No other needs identified at this time.  Refills on medications called in.     ROS:   Review of Systems   Constitutional: Negative for chills and fever.   HENT: Negative.    Eyes: Negative.    Respiratory: Negative.  Negative for cough and shortness of breath.    Cardiovascular: Negative for chest pain.   Gastrointestinal: Negative.  Negative for constipation and nausea.   Endocrine: Negative.    Genitourinary: Negative.    Musculoskeletal: Positive for gait problem.        Amputee   Skin: Negative.    Allergic/Immunologic: Negative.    Hematological: Negative.    Psychiatric/Behavioral: Negative.    All other systems reviewed and are negative.    Assessments:  · Environmental: single story home, no steps to enter, adequate lighting and temeprature control  · Functional Status: Independent with ADL's/IADL's, ambulates  with assistance of a cane/walker, continent of bowel and bladder  · Safety: Fall Precautions, Oxygen Use PRN  · Nutritional: Adequate  · Home Health: [facility]   · DME/Supplies: wheelchair, rollator walker, shower bench, oxygen tanks/concentrator     Objective:     Vitals:    05/21/20 1205   BP: (!) 146/64   Pulse: 70   Resp: 18   Temp: 98.1 °F (36.7 °C)   TempSrc: Temporal   SpO2: 98%   PainSc: 0-No pain     There is no height or weight on file to calculate BMI.    Physical Exam   Constitutional: He is oriented to person, place, and time. He appears well-developed and well-nourished.   HENT:   Head: Normocephalic and atraumatic.   Eyes: Pupils are equal, round, and reactive to light. EOM are normal.   Neck: Normal range of motion. Neck supple. No tracheal deviation present.   Cardiovascular: Normal rate and regular rhythm.   Murmur heard.  Pulmonary/Chest: Effort normal and breath sounds normal. No respiratory distress.   Abdominal: Soft. Bowel sounds are normal.   Musculoskeletal: Normal range of motion.   Bilateral amputee   Lymphadenopathy:     He has no cervical adenopathy.   Neurological: He is alert and oriented to person, place, and time.   Skin: Skin is warm and dry. Capillary refill takes less than 2 seconds.   Psychiatric: He has a normal mood and affect. His behavior is normal. Judgment normal.   Vitals reviewed.    Assessment:     Medical Follow-Up and Medication Review    Plan:     Ethical / Legal: Advance Care Planning   Capacity to make medical decisions:  yes, Conflict no  · Surrogate decision maker:  Name Laury Ross, Relationship: wife  · Advance Directives:  none  · HCPOA: none  · [place]:  none  · Code Status:  Full    Advanced Care Directives, HCPOA and [place] forms left in the home for family review, discussion and signing with instructions to return upon their next provider encounter for inclusion to the medical record.     Kodi was seen today for follow-up.  Diagnoses and all orders for  this visit:     Encounter for Medical Follow-Up and Medication Review   - [facility] Nurse Practitioner to schedule home visit with patient in 4 weeks or PRN    Were controlled substances prescribed?  No    Follow Up Appointments:   Future Appointments   Date Time Provider Department Center   5/21/2020 12:30 PM Porter Simon NP [facility]   8/4/2020  9:50 AM LABORATORY, [facility]   8/4/2020 10:00 AM SPECIMEN, [facility]   8/11/2020  1:30 PM Brenton Jacobson MD [facility]     Signature:    Porter Simon, MSN, APRN, FNP-C  [facility]    Attestation: Screening criteria to assess the level of the patient's risk for infection with COVID-19 as recommended by the CDC at the time of the above documented home visit concluded appropriateness to proceed. Universal precautions were maintained at all times, including provider use of >60% alcohol gel hand  immediately prior to entry and upon departing the patient's home as well as cleaning of equipment used in home visit with antibacterial/germicidal disposable wipes.

## 2020-05-25 ENCOUNTER — TELEPHONE (OUTPATIENT)
Dept: GASTROENTEROLOGY | Facility: CLINIC | Age: 70
End: 2020-05-25

## 2020-05-25 NOTE — TELEPHONE ENCOUNTER
----- Message from Yasmin Atkins PA-C sent at 5/25/2020 10:47 AM CDT -----  I have spoken with his cardiologist, Dr. Vilchis and Dr. Harrington regarding this patient. He is very high risk for colonoscopy/EGD. His cardiologist did not give me the go ahead to stop his anticoagulation for the procedure. I am going to reach out to anesthesia and heme/onc to discuss further. Anesthesia may not agree to put him under sedation. Can you please let them know I am still working on it?    Thanks,  Yasmin  ----- Message -----  From: Hedy Zhang MA  Sent: 5/25/2020  10:44 AM CDT  To: Yasmin Atkins PA-C    Any update on Cardiac clearance? Are we going to proceed with procedure?   ----- Message -----  From: Yasmin Atkins PA-C  Sent: 5/20/2020  11:55 AM CDT  To: [facility] Staff    Please let patient know I am reaching out to his cardiologist to ensure he has cardiac clearance for procedures. Also, I am waiting to hear back from on call physician.    Thank you,  Yasmin

## 2020-05-25 NOTE — TELEPHONE ENCOUNTER
Called patient and informed his wife that we are waiting on cardiology clearance. She verbalized understanding.

## 2020-05-26 ENCOUNTER — DOCUMENTATION ONLY (OUTPATIENT)
Dept: ENDOSCOPY | Facility: HOSPITAL | Age: 70
End: 2020-05-26

## 2020-05-27 ENCOUNTER — TELEPHONE (OUTPATIENT)
Dept: GASTROENTEROLOGY | Facility: CLINIC | Age: 70
End: 2020-05-27

## 2020-05-27 DIAGNOSIS — D64.9 ANEMIA, UNSPECIFIED TYPE: Primary | ICD-10-CM

## 2020-05-27 NOTE — TELEPHONE ENCOUNTER
Spoke with patient regarding status of EGD/colonoscopy. Informed patient that cardiology has considered him high risk for procedures. Also let him know that anesthesia and GI have reviewed his chart and are ok with moving forward with procedures as long as cardiology will allow him to come off of Plavix prior. Waiting on response from cardiology Dr. Boone, as he has been updated as well. In addition, GI Dr. Harrington has suggested patient receive one unit of blood due to low hemoglobin of 7.3. Patient verbalizes understanding and agrees with treatment plan. Orders for outpatient transfusion have been placed. They will call patient to schedule time.

## 2020-05-27 NOTE — TELEPHONE ENCOUNTER
Dr. Boone,    Thank you for your response. I have reached out to anesthesia at [facility] endoscopy who has reviewed his chart and is ok with moving forward with procedures. I would still need your clearance for him to safely come off of the Plavix prior to his procedure. Please let me know your thoughts.    Thank you,  Yasmin Atkins PA-C

## 2020-05-28 DIAGNOSIS — K92.1 HEMATOCHEZIA: ICD-10-CM

## 2020-05-28 DIAGNOSIS — D64.9 ANEMIA, UNSPECIFIED TYPE: Primary | ICD-10-CM

## 2020-05-28 RX ORDER — POLYETHYLENE GLYCOL 3350, SODIUM SULFATE ANHYDROUS, SODIUM BICARBONATE, SODIUM CHLORIDE, POTASSIUM CHLORIDE 236; 22.74; 6.74; 5.86; 2.97 G/4L; G/4L; G/4L; G/4L; G/4L
4 POWDER, FOR SOLUTION ORAL ONCE
Qty: 4000 ML | Refills: 0 | Status: SHIPPED | OUTPATIENT
Start: 2020-05-28 | End: 2020-05-28

## 2020-05-28 RX ORDER — SODIUM, POTASSIUM,MAG SULFATES 17.5-3.13G
SOLUTION, RECONSTITUTED, ORAL ORAL
Qty: 354 ML | Refills: 0 | Status: ON HOLD | OUTPATIENT
Start: 2020-05-28 | End: 2020-06-02 | Stop reason: ALTCHOICE

## 2020-05-28 NOTE — TELEPHONE ENCOUNTER
COVID Screening     1. Have you had a fever in the last 7 days or have you used fever reducing medicines for a fever in the last 7 days?  no    2. Are you experiencing shortness of breath, cough, muscle aches, loss of taste or loss of smell?  no    3. Are you residing with anyone who has tested positive for Covid?  no    If answered yes to any of the above questions, the pt must be scheduled for an appointment with their PCP.    A message also needs to be sent to the endoscopist to ensure the patient gets rescheduled at a later date.     ENDO screening    1. Have you been admitted overnight to the hospital in the past 3 months? no   If yes, schedule an appointment with PCP before scheduling endoscopic procedure.     2. Have you had a stent placed in the last 12 months? no   If yes, for a screening visit, cancel and message the ordering provider.  The patient will need a new order when the time is appropriate.     3. Have you had a stroke or heart attack in the past 6 months? no   If yes, cancel and refer patient to ordering provider for clearance, also message ordering provider to inform.     4. Have you had any chest pain in the past 3 months? no   If yes, Have you been evaluated by your PCP and/or cardiologist and it was determined to not be heart related? not applicable   If No, Pt needs to be seen by PCP or Cardiologist .  Pt can be scheduled once clearance obtained by either of those providers.     5. Do you take prescription weight loss medications?  no   If yes, must stop for 2 weeks prior to procedure.     6. Have you been diagnosed with diverticulitis within the past 3 months? no   If yes, must have been seen by GI within the last 3 months, if not schedule with GI [facility].    If pt has been seen by GI, schedule procedure 8-12 weeks post antibiotic treatment.     7. Are you on Dialysis? no  If yes, schedule procedure for the day AFTER dialysis.  Appt time should be 9am or later, patient arrival time is 2 hours  "prior.  Nulytely or    miralax prep for all patients with kidney disease.     8. Are you diabetic?  no   If yes, schedule morning appt. Advise pt to hold all diabetic meds day of procedure.     9. If pt is older than 80 years of age and HAS NOT been seen by GI or PCP within the last 6 months, needs appt with GI [facility]ADE[facility].   If pt has been seen by the GI provider or PCP within the past 6  months AND meets criteria, schedule procedure AND send message to the endoscopist.     10. Is patient on a "high risk" medication (blood thinner/antiplatelet agent)?  yes   If yes, has cardiac clearance been obtained within the last 60 days? Yes   If no, a new clearance needs to be obtained.       I have reviewed the last colonoscopy for recommendations regarding next procedure bowel prep.  yes  I have reviewed medications and allergies.  yes  I have verified the pharmacy information and appropriate prep sent if needed. yes  Prep instructions have been mailed or sent to portal per patient request. yes    If answers yes to any of the following, schedule at [facility] ONLY. If No, OK for either location.     Is BMI over 45?   Any complaints of chest pain, new onset or at rest?  Does pt have an AICD?  Is there a diagnosis of heart failure?  Does patient have an insulin pump?  If procedure for esophageal banding?      "

## 2020-05-29 ENCOUNTER — INFUSION (OUTPATIENT)
Dept: INFUSION THERAPY | Facility: HOSPITAL | Age: 70
End: 2020-05-29
Attending: INTERNAL MEDICINE
Payer: MEDICARE

## 2020-05-29 VITALS
SYSTOLIC BLOOD PRESSURE: 141 MMHG | HEART RATE: 66 BPM | TEMPERATURE: 98 F | DIASTOLIC BLOOD PRESSURE: 67 MMHG | RESPIRATION RATE: 18 BRPM

## 2020-05-29 DIAGNOSIS — D64.9 ANEMIA: Primary | ICD-10-CM

## 2020-05-29 PROCEDURE — 36430 TRANSFUSION BLD/BLD COMPNT: CPT | Mod: HCNC

## 2020-05-29 RX ORDER — HYDROCODONE BITARTRATE AND ACETAMINOPHEN 500; 5 MG/1; MG/1
TABLET ORAL
Status: DISCONTINUED | OUTPATIENT
Start: 2020-05-29 | End: 2020-06-04 | Stop reason: HOSPADM

## 2020-05-31 ENCOUNTER — APPOINTMENT (OUTPATIENT)
Dept: URGENT CARE | Facility: CLINIC | Age: 70
End: 2020-05-31
Payer: MEDICARE

## 2020-05-31 DIAGNOSIS — D64.9 ANEMIA, UNSPECIFIED TYPE: ICD-10-CM

## 2020-05-31 PROCEDURE — U0003 INFECTIOUS AGENT DETECTION BY NUCLEIC ACID (DNA OR RNA); SEVERE ACUTE RESPIRATORY SYNDROME CORONAVIRUS 2 (SARS-COV-2) (CORONAVIRUS DISEASE [COVID-19]), AMPLIFIED PROBE TECHNIQUE, MAKING USE OF HIGH THROUGHPUT TECHNOLOGIES AS DESCRIBED BY CMS-2020-01-R: HCPCS | Mod: HCNC

## 2020-06-02 ENCOUNTER — HOSPITAL ENCOUNTER (OUTPATIENT)
Facility: HOSPITAL | Age: 70
Discharge: HOME | End: 2020-06-02
Attending: INTERNAL MEDICINE | Admitting: INTERNAL MEDICINE
Payer: MEDICARE

## 2020-06-02 ENCOUNTER — ANESTHESIA (OUTPATIENT)
Dept: ENDOSCOPY | Facility: HOSPITAL | Age: 70
End: 2020-06-02
Payer: MEDICARE

## 2020-06-02 ENCOUNTER — ANESTHESIA EVENT (OUTPATIENT)
Dept: ENDOSCOPY | Facility: HOSPITAL | Age: 70
End: 2020-06-02
Payer: MEDICARE

## 2020-06-02 VITALS
BODY MASS INDEX: 22.08 KG/M2 | RESPIRATION RATE: 18 BRPM | SYSTOLIC BLOOD PRESSURE: 164 MMHG | HEART RATE: 61 BPM | DIASTOLIC BLOOD PRESSURE: 81 MMHG | TEMPERATURE: 98 F | WEIGHT: 167.31 LBS | OXYGEN SATURATION: 96 %

## 2020-06-02 DIAGNOSIS — D50.9 IDA (IRON DEFICIENCY ANEMIA): ICD-10-CM

## 2020-06-02 LAB
CEA SERPL-MCNC: 2.7 NG/ML (ref 0–5)
HCT VFR BLD AUTO: 32.8 % (ref 40–54)
HGB BLD-MCNC: 10 G/DL (ref 14–18)
SARS-COV-2 RDRP RESP QL NAA+PROBE: NEGATIVE
SARS-COV-2 RNA RESP QL NAA+PROBE: NOT DETECTED

## 2020-06-02 PROCEDURE — 88305 TISSUE EXAM BY PATHOLOGIST: ICD-10-PCS | Mod: 26,HCNC,, | Performed by: PATHOLOGY

## 2020-06-02 PROCEDURE — 43235 EGD DIAGNOSTIC BRUSH WASH: CPT | Mod: 51,HCNC,, | Performed by: INTERNAL MEDICINE

## 2020-06-02 PROCEDURE — 82378 CARCINOEMBRYONIC ANTIGEN: CPT | Mod: HCNC

## 2020-06-02 PROCEDURE — 45385 PR COLONOSCOPY,REMV LESN,SNARE: ICD-10-PCS | Mod: HCNC,,, | Performed by: INTERNAL MEDICINE

## 2020-06-02 PROCEDURE — 85014 HEMATOCRIT: CPT | Mod: HCNC

## 2020-06-02 PROCEDURE — 45381 COLONOSCOPY SUBMUCOUS NJX: CPT | Mod: HCNC,,, | Performed by: INTERNAL MEDICINE

## 2020-06-02 PROCEDURE — 45381 PR COLONOSCPY,FLEX,W/DIR SUBMUC INJECT: ICD-10-PCS | Mod: HCNC,,, | Performed by: INTERNAL MEDICINE

## 2020-06-02 PROCEDURE — 25000003 PHARM REV CODE 250: Mod: HCNC | Performed by: NURSE ANESTHETIST, CERTIFIED REGISTERED

## 2020-06-02 PROCEDURE — 45380 COLONOSCOPY AND BIOPSY: CPT | Mod: 59,HCNC,, | Performed by: INTERNAL MEDICINE

## 2020-06-02 PROCEDURE — 37000009 HC ANESTHESIA EA ADD 15 MINS: Mod: HCNC | Performed by: INTERNAL MEDICINE

## 2020-06-02 PROCEDURE — 45380 COLONOSCOPY AND BIOPSY: CPT | Mod: HCNC | Performed by: INTERNAL MEDICINE

## 2020-06-02 PROCEDURE — 85018 HEMOGLOBIN: CPT | Mod: HCNC

## 2020-06-02 PROCEDURE — 88305 TISSUE EXAM BY PATHOLOGIST: CPT | Mod: 26,HCNC,, | Performed by: PATHOLOGY

## 2020-06-02 PROCEDURE — 88305 TISSUE EXAM BY PATHOLOGIST: CPT | Mod: HCNC | Performed by: PATHOLOGY

## 2020-06-02 PROCEDURE — 45380 PR COLONOSCOPY,BIOPSY: ICD-10-PCS | Mod: 59,HCNC,, | Performed by: INTERNAL MEDICINE

## 2020-06-02 PROCEDURE — 27200997: Mod: HCNC | Performed by: INTERNAL MEDICINE

## 2020-06-02 PROCEDURE — 63600175 PHARM REV CODE 636 W HCPCS: Mod: HCNC | Performed by: NURSE ANESTHETIST, CERTIFIED REGISTERED

## 2020-06-02 PROCEDURE — U0002 COVID-19 LAB TEST NON-CDC: HCPCS | Mod: HCNC

## 2020-06-02 PROCEDURE — 45385 COLONOSCOPY W/LESION REMOVAL: CPT | Mod: HCNC,,, | Performed by: INTERNAL MEDICINE

## 2020-06-02 PROCEDURE — 43235 EGD DIAGNOSTIC BRUSH WASH: CPT | Mod: HCNC | Performed by: INTERNAL MEDICINE

## 2020-06-02 PROCEDURE — 37000008 HC ANESTHESIA 1ST 15 MINUTES: Mod: HCNC | Performed by: INTERNAL MEDICINE

## 2020-06-02 PROCEDURE — 27201012 HC FORCEPS, HOT/COLD, DISP: Mod: HCNC | Performed by: INTERNAL MEDICINE

## 2020-06-02 PROCEDURE — 27201089 HC SNARE, DISP (ANY): Mod: HCNC | Performed by: INTERNAL MEDICINE

## 2020-06-02 PROCEDURE — 43235 PR EGD, FLEX, DIAGNOSTIC: ICD-10-PCS | Mod: 51,HCNC,, | Performed by: INTERNAL MEDICINE

## 2020-06-02 PROCEDURE — 45381 COLONOSCOPY SUBMUCOUS NJX: CPT | Mod: HCNC | Performed by: INTERNAL MEDICINE

## 2020-06-02 PROCEDURE — 45385 COLONOSCOPY W/LESION REMOVAL: CPT | Mod: HCNC | Performed by: INTERNAL MEDICINE

## 2020-06-02 RX ORDER — PROPOFOL 10 MG/ML
VIAL (ML) INTRAVENOUS
Status: DISCONTINUED | OUTPATIENT
Start: 2020-06-02 | End: 2020-06-02

## 2020-06-02 RX ORDER — ETOMIDATE 2 MG/ML
INJECTION INTRAVENOUS
Status: DISCONTINUED | OUTPATIENT
Start: 2020-06-02 | End: 2020-06-02

## 2020-06-02 RX ORDER — LIDOCAINE HYDROCHLORIDE 10 MG/ML
INJECTION, SOLUTION EPIDURAL; INFILTRATION; INTRACAUDAL; PERINEURAL
Status: DISCONTINUED | OUTPATIENT
Start: 2020-06-02 | End: 2020-06-02

## 2020-06-02 RX ORDER — SODIUM CHLORIDE, SODIUM LACTATE, POTASSIUM CHLORIDE, CALCIUM CHLORIDE 600; 310; 30; 20 MG/100ML; MG/100ML; MG/100ML; MG/100ML
INJECTION, SOLUTION INTRAVENOUS CONTINUOUS
Status: DISCONTINUED | OUTPATIENT
Start: 2020-06-02 | End: 2020-06-02 | Stop reason: HOSPADM

## 2020-06-02 RX ORDER — SODIUM CHLORIDE, SODIUM LACTATE, POTASSIUM CHLORIDE, CALCIUM CHLORIDE 600; 310; 30; 20 MG/100ML; MG/100ML; MG/100ML; MG/100ML
INJECTION, SOLUTION INTRAVENOUS CONTINUOUS PRN
Status: DISCONTINUED | OUTPATIENT
Start: 2020-06-02 | End: 2020-06-02

## 2020-06-02 RX ADMIN — PROPOFOL 30 MG: 10 INJECTION, EMULSION INTRAVENOUS at 01:06

## 2020-06-02 RX ADMIN — ETOMIDATE 4 MG: 2 INJECTION, SOLUTION INTRAVENOUS at 01:06

## 2020-06-02 RX ADMIN — PROPOFOL 50 MG: 10 INJECTION, EMULSION INTRAVENOUS at 01:06

## 2020-06-02 RX ADMIN — ETOMIDATE 6 MG: 2 INJECTION, SOLUTION INTRAVENOUS at 01:06

## 2020-06-02 RX ADMIN — LIDOCAINE HYDROCHLORIDE 50 MG: 10 INJECTION, SOLUTION EPIDURAL; INFILTRATION; INTRACAUDAL; PERINEURAL at 01:06

## 2020-06-02 RX ADMIN — SODIUM CHLORIDE, SODIUM LACTATE, POTASSIUM CHLORIDE, AND CALCIUM CHLORIDE: 600; 310; 30; 20 INJECTION, SOLUTION INTRAVENOUS at 01:06

## 2020-06-02 NOTE — TRANSFER OF CARE
Anesthesia Transfer of Care Note    Patient: Kodi Ribeiro    Procedure(s) Performed: Procedure(s) (LRB):  EGD (ESOPHAGOGASTRODUODENOSCOPY) (N/A)  COLONOSCOPY (N/A)    Patient location: GI    Anesthesia Type: MAC    Transport from OR: Transported from OR on room air with adequate spontaneous ventilation    Post pain: adequate analgesia    Post assessment: no apparent anesthetic complications    Post vital signs: stable    Level of consciousness: awake, alert and oriented    Nausea/Vomiting: no nausea/vomiting    Complications: none    Transfer of care protocol was followed      Last vitals:   Visit Vitals  BP (!) 146/77 (BP Location: Left arm, Patient Position: Sitting)   Pulse 69   Temp 37 °C (98.6 °F) (Temporal)   Resp 18   Wt 75.9 kg (167 lb 5.3 oz)   SpO2 98%   BMI 22.08 kg/m²

## 2020-06-02 NOTE — DISCHARGE INSTRUCTIONS
Diverticulosis    Diverticulosis means that small pouches have formed in the wall of your large intestine (colon). Most often, this problem causes no symptoms and is common as people age. But the pouches in the colon are at risk of becoming infected. When this happens, the condition is called diverticulitis. Although most people with diverticulosis never develop diverticulitis, it is still not uncommon. Rectal bleeding can also occur and in less common situations, a type of colon inflammation called colitis.  While most people do not have symptoms, some people with diverticulosis may have:  · Abdominal cramps and pain  · Bloating  · Constipation  · Change in bowel habits  Causes  The exact cause of diverticulosis (and diverticulitis) has not been proved, but a few things are associated with the condition:  · Low-fiber diet  · Constipation  · Lack of exercise  Your healthcare provider will talk with you about how to manage your condition. Diet changes may be all that are needed to help control diverticulosis and prevent progression to diverticulitis. If you develop diverticulitis, you will likely need other treatments.  Home care  You may be told to take fiber supplements daily. Fiber adds bulk to the stool so that it passes through the colon more easily. Stool softeners may be recommended. You may also be given medications for pain relief. Be sure to take all medications as directed.  In the past, people were told to avoid corn, nuts, and seeds. This is no longer necessary.  Follow these guidelines when caring for yourself at home:  · Eat unprocessed foods that are high in fiber. Whole grains, fruits, and vegetables are good choices.  · Drink 6 to 8 glasses of water every day unless your healthcare provider has you limit how much fluid you should have.  · Watch for changes in your bowel movements. Tell your provider if you notice any changes.  · Begin an exercise program. Ask your provider how to get started.  Generally, walking is the best.  · Get plenty of rest and sleep.  Follow-up care  Follow up with your healthcare provider, or as advised. Regular visits may be needed to check on your health. Sometimes special procedures such as colonoscopy, are needed after an episode of diverticulitis or blooding. Be sure to keep all your appointments.  If a stool sample was taken, or cultures were done, you should be told if they are positive, or if your treatment needs to be changed. You can call as directed for the results.  If X-rays were done, a radiologist will look at them. You will be told if there is a change in your treatment.  If antibiotics were prescribed, be sure to finish them all.  When to seek medical advice  Call your healthcare provider right away if any of these occur:  · Fever of 100.4°F (38°C) or higher, or as directed by your healthcare provider  · Severe cramps in the lower left side of the abdomen or pain that is getting worse  · Tenderness in the lower left side of the abdomen or worsening pain throughout the abdomen  · Diarrhea or constipation that doesn't get better within 24 hours  · Nausea and vomiting  · Bleeding from the rectum  Call 911  Call emergency services if any of the following occur:  · Trouble breathing  · Confusion  · Very drowsy or trouble awakening  · Fainting or loss of consciousness  · Rapid heart rate  · Chest pain  Date Last Reviewed: 12/30/2015 © 2000-2017 [employer]. [address] [place] [address]. All rights reserved. This information is not intended as a substitute for professional medical care. Always follow your healthcare professional's instructions.        What Is a Hiatal Hernia?    Hiatal hernia is when the area where the stomach and esophagus meet bulges up through the diaphragm into the chest cavity. In some cases, part of the stomach may bulge above the diaphragm. Stomach acid may move up into the esophagus and cause symptoms. The symptoms are  often blamed on gastroesophageal reflux disease (GERD). You may only know about the hernia when it shows up on an X-ray taken for other reasons.   What you may feel  The hiatus is a normal hole in the diaphragm. The esophagus passes through this hole and leads to the stomach. In some cases, part of the stomach may bulge above the diaphragm. This bulge is called a hernia. Stomach acid may move up into the esophagus and cause symptoms.  When you eat, the muscle at the hiatus relaxes to allow food to pass into the stomach. It tightens again to keep food and digestive acids in the stomach.  Many people with hiatal hernias have mild symptoms. You may notice the following GERD symptoms:  · Heartburn or other chest discomfort  · A feeling of chest fullness after a meal  · Frequent burping  · Acid taste in the mouth  · Trouble swallowing  Treating symptoms  If you have been diagnosed with hiatal hernia, these suggestions may help improve symptoms:  · Lose excess weight. Extra weight puts pressure on the stomach and esophagus.  · Dont lie down after eating. Sit up for at least an hour after eating. Lying down after eating can increase symptoms.  · Avoid certain foods and drinks. These include fatty foods, chocolate, coffee, mint, and other foods that cause symptoms for you.  · Dont smoke or drink alcohol. These can worsen symptoms.  · Look at your medicines. Discuss your medicines with your healthcare provider. Many medicines can cause symptoms.  · Consider an antacid medicine. Ask your healthcare provider about over-the-counter and prescription medicines that may help.  · Ask about surgery, if needed. Surgery is a treatment choice for some people. Your healthcare provider can determine if surgery is an option for you.    Date Last Reviewed: 10/1/2016  © 1195-6475 [employer]. [address] [place] [address], [place] [address]. All rights reserved. This information is not intended as a substitute for professional  medical care. Always follow your healthcare professional's instructions.

## 2020-06-02 NOTE — [employer] PATIENT INSTRUCTIONS
Discharge Summary/Instructions after an Endoscopic Procedure  Patient Name: Kodi Ribeiro  Patient MRN: [id number]  Patient YOB: 1950 Tuesday, June 2, 2020 Rosanna Harrington MD  RESTRICTIONS:  During your procedure today, you received medications for sedation.  These   medications may affect your judgment, balance and coordination.  Therefore,   for 24 hours, you have the following restrictions:   - DO NOT drive a car, operate machinery, make legal/financial decisions,   sign important papers or drink alcohol.    ACTIVITY:  Today: no heavy lifting, straining or running due to procedural   sedation/anesthesia.  The following day: return to full activity including work.  DIET:  Eat and drink normally unless instructed otherwise.     TREATMENT FOR COMMON SIDE EFFECTS:  - Mild abdominal pain, nausea, belching, bloating or excessive gas:  rest,   eat lightly and use a heating pad.  - Sore Throat: treat with throat lozenges and/or gargle with warm salt   water.  - Because air was used during the procedure, expelling large amounts of air   from your rectum or belching is normal.  - If a bowel prep was taken, you may not have a bowel movement for 1-3 days.    This is normal.  SYMPTOMS TO WATCH FOR AND REPORT TO YOUR PHYSICIAN:  1. Abdominal pain or bloating, other than gas cramps.  2. Chest pain.  3. Back pain.  4. Signs of infection such as: chills or fever occurring within 24 hours   after the procedure.  5. Rectal bleeding, which would show as bright red, maroon, or black stools.   (A tablespoon of blood from the rectum is not serious, especially if   hemorrhoids are present.)  6. Vomiting.  7. Weakness or dizziness.  GO DIRECTLY TO THE NEAREST EMERGENCY ROOM IF YOU HAVE ANY OF THE FOLLOWING:      Difficulty breathing              Chills and/or fever over 101 F   Persistent vomiting and/or vomiting blood   Severe abdominal pain   Severe chest pain   Black, tarry stools   Bleeding- more than one  tablespoon   Any other symptom or condition that you feel may need urgent attention  Your doctor recommends these additional instructions:  If any biopsies were taken, your doctors clinic will contact you in 1 to 2   weeks with any results.  - Discharge patient to home after colonoscopy.   - Proceed with upper endoscopy.  For questions, problems or results please call your physician Rosanna Harrington MD at Work:  [phone]  If you have any questions about the above instructions, call the GI   department at [phone] or call the endoscopy unit at [phone]   from 7am until 3 pm.  [facility], EMERGENCY ROOM PHONE NUMBER:   [phone]  IF A COMPLICATION OR EMERGENCY SITUATION ARISES AND YOU ARE UNABLE TO REACH   YOUR PHYSICIAN - GO DIRECTLY TO THE EMERGENCY ROOM.  I have read or have had read to me these discharge instructions for my   procedure and have received a written copy.  I understand these   instructions and will follow-up with my physician if I have any questions.     __________________________________       _____________________________________  Nurse Signature                                          Patient/Designated   Responsible Party Signature  MD Rosanna Barrera MD  6/2/2020 2:26:43 PM  This report has been verified and signed electronically.  [employer]

## 2020-06-02 NOTE — [employer] PATIENT INSTRUCTIONS
Discharge Summary/Instructions after an Endoscopic Procedure  Patient Name: Kodi Ribeiro  Patient MRN: [id number]  Patient YOB: 1950 Tuesday, June 2, 2020 Rosanna Harrington MD  RESTRICTIONS:  During your procedure today, you received medications for sedation.  These   medications may affect your judgment, balance and coordination.  Therefore,   for 24 hours, you have the following restrictions:   - DO NOT drive a car, operate machinery, make legal/financial decisions,   sign important papers or drink alcohol.    ACTIVITY:  Today: no heavy lifting, straining or running due to procedural   sedation/anesthesia.  The following day: return to full activity including work.  DIET:  Eat and drink normally unless instructed otherwise.     TREATMENT FOR COMMON SIDE EFFECTS:  - Mild abdominal pain, nausea, belching, bloating or excessive gas:  rest,   eat lightly and use a heating pad.  - Sore Throat: treat with throat lozenges and/or gargle with warm salt   water.  - Because air was used during the procedure, expelling large amounts of air   from your rectum or belching is normal.  - If a bowel prep was taken, you may not have a bowel movement for 1-3 days.    This is normal.  SYMPTOMS TO WATCH FOR AND REPORT TO YOUR PHYSICIAN:  1. Abdominal pain or bloating, other than gas cramps.  2. Chest pain.  3. Back pain.  4. Signs of infection such as: chills or fever occurring within 24 hours   after the procedure.  5. Rectal bleeding, which would show as bright red, maroon, or black stools.   (A tablespoon of blood from the rectum is not serious, especially if   hemorrhoids are present.)  6. Vomiting.  7. Weakness or dizziness.  GO DIRECTLY TO THE NEAREST EMERGENCY ROOM IF YOU HAVE ANY OF THE FOLLOWING:      Difficulty breathing              Chills and/or fever over 101 F   Persistent vomiting and/or vomiting blood   Severe abdominal pain   Severe chest pain   Black, tarry stools   Bleeding- more than one  tablespoon   Any other symptom or condition that you feel may need urgent attention  Your doctor recommends these additional instructions:  If any biopsies were taken, your doctors clinic will contact you in 1 to 2   weeks with any results.  - Discharge patient to home (with escort).   - Await pathology results.   - Check CEA today.   - Refer to a colo-rectal surgeon Dr. Yang next week.  - Discontinue Plavix indefinitely.  - No aspirin, ibuprofen, naproxen, or other non-steroidal anti-inflammatory   drugs.   - Patient has a contact number available for emergencies.  The signs and   symptoms of potential delayed complications were discussed with the   patient.  Return to normal activities tomorrow.  Written discharge   instructions were provided to the patient.   - Resume previous diet.   - Repeat colonoscopy in 1 year for surveillance.  For questions, problems or results please call your physician Rosanna Harrington MD at Work:  [phone]  If you have any questions about the above instructions, call the GI   department at [phone] or call the endoscopy unit at [phone]   from 7am until 3 pm.  [facility], EMERGENCY ROOM PHONE NUMBER:   [phone]  IF A COMPLICATION OR EMERGENCY SITUATION ARISES AND YOU ARE UNABLE TO REACH   YOUR PHYSICIAN - GO DIRECTLY TO THE EMERGENCY ROOM.  I have read or have had read to me these discharge instructions for my   procedure and have received a written copy.  I understand these   instructions and will follow-up with my physician if I have any questions.     __________________________________       _____________________________________  Nurse Signature                                          Patient/Designated   Responsible Party Signature  MD Rosanna Barrera MD  6/2/2020 2:37:59 PM  This report has been verified and signed electronically.  [employer]

## 2020-06-02 NOTE — ANESTHESIA PREPROCEDURE EVALUATION
06/02/2020  Kodi Ribeiro is a 70 y.o., male.    Pre-op Assessment    I have reviewed the Patient Summary Reports.     I have reviewed the Nursing Notes.    I have reviewed the Medications.     Review of Systems  Anesthesia Hx:  No problems with previous Anesthesia Denies Hx of Anesthetic complications  Denies Family Hx of Anesthesia complications.   Denies Personal Hx of Anesthesia complications.   Social:  Non-Smoker, No Alcohol Use    Cardiovascular:   Hypertension, well controlled Past MI CAD  CABG/stent (Stents x 3 in 2020)  Angina CHF PVD hyperlipidemia ECG has been reviewed. Echo 2020  CONCLUSIONS     1 - Concentric remodeling.     2 - No wall motion abnormalities.     3 - Normal left ventricular systolic function (EF 55-60%).     4 - Indeterminate LV diastolic function.     5 - Normal right ventricular systolic function .     6 - The estimated PA systolic pressure is greater than 16 mmHg.    Pulmonary:  Pulmonary Normal    Renal/:   Chronic Renal Disease, CRI    Hepatic/GI:   GERD, poorly controlled    Neurological:   CVA    Endocrine:  Endocrine Normal    Psych:  Psychiatric Normal         Patient Active Problem List   Diagnosis    Ureteral stent retained    Hiatal hernia with GERD    Hyperlipidemia    Essential hypertension    Foot ulcer - Left Foot    Idiopathic peripheral neuropathy    Anemia    PVD (peripheral vascular disease)    CKD (chronic kidney disease) stage 4, GFR 15-29 ml/min    Horseshoe kidney    CAD;Old MI ; s/p stents x 3 (2000)     Aortic stenosis    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    Ureteral stricture, left    Left carotid artery stenosis    Ureteral stricture    History of transmetatarsal amputation of left foot    Iron deficiency anemia due to chronic blood loss    Persistent proteinuria    CKD stage G4/A3, GFR 15-29 and  albumin creatinine ratio >300 mg/g    Elevated serum immunoglobulin free light chains    Unstable angina    Hypomagnesemia    Renal dysfunction    Hydronephrosis of left kidney    Coronary artery disease involving native coronary artery of native heart without angina pectoris    Chronic respiratory failure with hypoxia    Chest pain, midsternal, H/O known CAD     Centrilobular emphysema    Follow up    Dysthymia    LISA (iron deficiency anemia)     Past Surgical History:   Procedure Laterality Date    ABDOMINAL AORTIC ANEURYSM REPAIR      ABDOMINAL AORTIC ANEURYSM REPAIR  1996/2014    AMPUTATION, LOWER LIMB      AORTA - BILATERAL FEMORAL ARTERY BYPASS GRAFT  2014    Left and right leg    CORONARY ANGIOPLASTY WITH STENT PLACEMENT  2000    Three placed in heart    CYSTOSCOPY W/ RETROGRADES Left 5/29/2018    Procedure: CYSTOSCOPY, WITH RETROGRADE PYELOGRAM;  Surgeon: Scooter Jin IV, MD;  Location[facility] OR;  Service: Urology;  Laterality: Left;    CYSTOSCOPY W/ URETERAL STENT PLACEMENT Left 5/29/2018    Procedure: CYSTOSCOPY, WITH URETERAL STENT INSERTION;  Surgeon: Scooter Jin IV, MD;  Location: [facility];  Service: Urology;  Laterality: Left;    CYSTOSCOPY W/ URETERAL STENT PLACEMENT Left 2/4/2020    Procedure: CYSTOSCOPY, WITH URETERAL STENT INSERTION;  Surgeon: Scooter Jin IV, MD;  Location: [facility];  Service: Urology;  Laterality: Left;    CYSTOSCOPY W/ URETERAL STENT REMOVAL Left 5/29/2018    Procedure: CYSTOSCOPY, WITH URETERAL STENT REMOVAL;  Surgeon: Scooter Jin IV, MD;  Location: [facility];  Service: Urology;  Laterality: Left;    CYSTOSCOPY W/ URETERAL STENT REMOVAL Left 2/4/2020    Procedure: CYSTOSCOPY, WITH URETERAL STENT REMOVAL;  Surgeon: Scooter Jin IV, MD;  Location: [facility];  Service: Urology;  Laterality: Left;    FOOT AMPUTATION THROUGH METATARSAL  1996    left    FOOT SURGERY Bilateral 1980's    per patient multiple toe amputations prior to.  partial  foot amputation:first great toe then other toes     KIDNEY SURGERY  2014    per patient separation of horseshoe kidney @ time of AAA repair    LEFT HEART CATHETERIZATION Left 3/7/2019    Procedure: CATHETERIZATION, HEART, LEFT;  Surgeon: Adriel Boone MD;  Location: [facility] CATH LAB;  Service: Cardiology;  Laterality: Left;  630 admit for IV hydration  10am start    LUNG LOBECTOMY Right 1970s    per patient not cancer    right below knee amputation  2009 (approx)    SMALL INTESTINE SURGERY  2014    per patient partial @ time of aaa repair  not small bowel - large bowel bowel compromised bythtwe AAAbowel    TONSILLECTOMY  1955 aprox    URETERAL STENT PLACEMENT Left     annually replaced since 2012 or so  Dr Jin           Physical Exam  General:  Well nourished    Airway/Jaw/Neck:  Airway Findings: Mouth Opening: Normal Tongue: Normal  General Airway Assessment: Adult  Mallampati: II  TM Distance: 4 - 6 cm  Jaw/Neck Findings:  Neck ROM: Normal ROM      Dental:  Dental Findings: In tact   Chest/Lungs:  Chest/Lungs Findings: Clear to auscultation, Normal Respiratory Rate     Heart/Vascular:  Heart Findings: Rate: Normal  Rhythm: Regular Rhythm  Sounds: Normal  Other Heart Findings: 5-20-20 Not from Dr Boone -Cardilolgy  Pt is at increased risk for CV complications for colonoscopy.  Caution advised with sedation.     Dr Boone        Mental Status:  Mental Status Findings:  Cooperative, Alert and Oriented       Lab Results   Component Value Date    WBC 4.05 02/04/2020    HGB 8.8 (L) 02/04/2020    HCT 28.8 (L) 02/04/2020    MCV 88 02/04/2020     02/04/2020         Chemistry        Component Value Date/Time     05/18/2020 1256    K 4.9 05/18/2020 1256     05/18/2020 1256    CO2 20 (L) 05/18/2020 1256    BUN 38 (H) 05/18/2020 1256    CREATININE 2.3 (H) 05/18/2020 1256     05/18/2020 1256        Component Value Date/Time    CALCIUM 8.9 05/18/2020 1256    ALKPHOS 179 (H) 05/18/2020 1256     AST 14 05/18/2020 1256    ALT 11 05/18/2020 1256    BILITOT 0.3 05/18/2020 1256    ESTGFRAFRICA 31.8 (A) 05/18/2020 1256    EGFRNONAA 27.5 (A) 05/18/2020 1256            Anesthesia Plan  Type of Anesthesia, risks & benefits discussed:  Anesthesia Type:  MAC  Patient's Preference:   Intra-op Monitoring Plan: standard ASA monitors  Intra-op Monitoring Plan Comments:   Post Op Pain Control Plan: multimodal analgesia  Post Op Pain Control Plan Comments:   Induction:   IV  Beta Blocker:  Patient is not currently on a Beta-Blocker (No further documentation required).       Informed Consent: Patient understands risks and agrees with Anesthesia plan.  Questions answered. Anesthesia consent signed with patient.  ASA Score: 4     Day of Surgery Review of History & Physical:  There are no significant changes.  H&P update referred to the surgeon.         Ready For Surgery From Anesthesia Perspective.

## 2020-06-02 NOTE — INTERVAL H&P NOTE
The patient has been examined and the H&P has been reviewed:    I concur with the findings and no changes have occurred since H&P was written.    Anesthesia/Surgery risks, benefits and alternative options discussed and understood by patient/family.          Active Hospital Problems    Diagnosis  POA    LISA (iron deficiency anemia) [D50.9]  Yes      Resolved Hospital Problems   No resolved problems to display.

## 2020-06-02 NOTE — INTERVAL H&P NOTE
The patient has been examined and the H&P has been reviewed:    I concur with the findings and changes have been noted since the H&P was written: patient reports he was experiencing rectal bleeding while on Plavix. Since holding this medication, the rectal bleeding has stopped. held Plavix x 4 days.    Anesthesia/Surgery risks, benefits and alternative options discussed and understood by patient/family.          Active Hospital Problems    Diagnosis  POA    LISA (iron deficiency anemia) [D50.9]  Yes      Resolved Hospital Problems   No resolved problems to display.

## 2020-06-03 ENCOUNTER — TELEPHONE (OUTPATIENT)
Dept: SURGERY | Facility: CLINIC | Age: 70
End: 2020-06-03

## 2020-06-03 NOTE — TELEPHONE ENCOUNTER
Spoke to pt - Advised of his appt with Dr Yang on Monday 6/8/2020, @ 0940, [place] location, [facility], First Floor -Pt correctly repeated back all appt information

## 2020-06-04 DIAGNOSIS — C18.9 MALIGNANT NEOPLASM OF COLON, UNSPECIFIED PART OF COLON: Primary | ICD-10-CM

## 2020-06-04 DIAGNOSIS — C18.9 COLON ADENOCARCINOMA: ICD-10-CM

## 2020-06-04 NOTE — ANESTHESIA POSTPROCEDURE EVALUATION
Anesthesia Post Evaluation    Patient: Kodi Ribeiro    Procedure(s) Performed: Procedure(s) (LRB):  EGD (ESOPHAGOGASTRODUODENOSCOPY) (N/A)  COLONOSCOPY (N/A)    Final Anesthesia Type: general    Patient location during evaluation: PACU  Patient participation: Yes- Able to Participate  Level of consciousness: awake and alert  Post-procedure vital signs: reviewed and stable  Pain management: adequate  Airway patency: patent  DIANE mitigation strategies: Extubation while patient is awake  PONV status at discharge: No PONV  Anesthetic complications: no      Cardiovascular status: hemodynamically stable  Respiratory status: spontaneous ventilation  Hydration status: euvolemic  Follow-up not needed.          Vitals Value Taken Time   /81 6/2/2020  2:35 PM   Temp 36.6 °C (97.9 °F) 6/2/2020  2:35 PM   Pulse 61 6/2/2020  2:35 PM   Resp 18 6/2/2020  2:35 PM   SpO2 96 % 6/2/2020  2:35 PM         Event Time     Out of Recovery 14:53:00          Pain/Rommel Score: No data recorded

## 2020-06-05 ENCOUNTER — HOSPITAL ENCOUNTER (OUTPATIENT)
Dept: RADIOLOGY | Facility: HOSPITAL | Age: 70
Discharge: HOME | End: 2020-06-05
Attending: COLON & RECTAL SURGERY
Payer: MEDICARE

## 2020-06-05 DIAGNOSIS — C18.9 MALIGNANT NEOPLASM OF COLON, UNSPECIFIED PART OF COLON: ICD-10-CM

## 2020-06-05 LAB
FINAL PATHOLOGIC DIAGNOSIS: NORMAL
GROSS: NORMAL

## 2020-06-05 PROCEDURE — 25500020 PHARM REV CODE 255: Mod: HCNC | Performed by: COLON & RECTAL SURGERY

## 2020-06-05 PROCEDURE — 74176 CT ABD & PELVIS W/O CONTRAST: CPT | Mod: TC,HCNC

## 2020-06-05 PROCEDURE — 71250 CT THORAX DX C-: CPT | Mod: TC,HCNC

## 2020-06-05 RX ADMIN — IOHEXOL 30 ML: 350 INJECTION, SOLUTION INTRAVENOUS at 04:06

## 2020-06-08 ENCOUNTER — OFFICE VISIT (OUTPATIENT)
Dept: SURGERY | Facility: CLINIC | Age: 70
DRG: 329 | End: 2020-06-08
Payer: MEDICARE

## 2020-06-08 ENCOUNTER — HOSPITAL ENCOUNTER (INPATIENT)
Facility: HOSPITAL | Age: 70
LOS: 9 days | Discharge: HOME HEALTH | DRG: 329 | End: 2020-06-17
Attending: FAMILY MEDICINE | Admitting: INTERNAL MEDICINE
Payer: MEDICARE

## 2020-06-08 VITALS
SYSTOLIC BLOOD PRESSURE: 142 MMHG | HEART RATE: 76 BPM | DIASTOLIC BLOOD PRESSURE: 69 MMHG | WEIGHT: 175.94 LBS | TEMPERATURE: 98 F | BODY MASS INDEX: 23.21 KG/M2

## 2020-06-08 DIAGNOSIS — N17.9 ACUTE RENAL FAILURE SUPERIMPOSED ON STAGE 3 CHRONIC KIDNEY DISEASE, UNSPECIFIED ACUTE RENAL FAILURE TYPE: ICD-10-CM

## 2020-06-08 DIAGNOSIS — I95.1 ORTHOSTASIS: ICD-10-CM

## 2020-06-08 DIAGNOSIS — C18.9 COLON ADENOCARCINOMA: Primary | ICD-10-CM

## 2020-06-08 DIAGNOSIS — K92.1 HEMATOCHEZIA: ICD-10-CM

## 2020-06-08 DIAGNOSIS — N18.3 ACUTE RENAL FAILURE SUPERIMPOSED ON STAGE 3 CHRONIC KIDNEY DISEASE, UNSPECIFIED ACUTE RENAL FAILURE TYPE: ICD-10-CM

## 2020-06-08 DIAGNOSIS — C18.9 COLON ADENOCARCINOMA: ICD-10-CM

## 2020-06-08 DIAGNOSIS — R55 VASOVAGAL EPISODE: ICD-10-CM

## 2020-06-08 DIAGNOSIS — D64.9 SYMPTOMATIC ANEMIA: ICD-10-CM

## 2020-06-08 DIAGNOSIS — C18.9 MALIGNANT NEOPLASM OF COLON, UNSPECIFIED PART OF COLON: ICD-10-CM

## 2020-06-08 DIAGNOSIS — K92.2 ACUTE LOWER GI BLEEDING: Primary | ICD-10-CM

## 2020-06-08 DIAGNOSIS — Z01.818 PRE-OP EVALUATION: ICD-10-CM

## 2020-06-08 PROCEDURE — 1101F PT FALLS ASSESS-DOCD LE1/YR: CPT | Mod: HCNC,CPTII,S$GLB, | Performed by: COLON & RECTAL SURGERY

## 2020-06-08 PROCEDURE — 3077F SYST BP >= 140 MM HG: CPT | Mod: HCNC,CPTII,S$GLB, | Performed by: COLON & RECTAL SURGERY

## 2020-06-08 PROCEDURE — 99499 UNLISTED E&M SERVICE: CPT | Mod: HCNC,S$GLB,, | Performed by: COLON & RECTAL SURGERY

## 2020-06-08 PROCEDURE — 1126F PR PAIN SEVERITY QUANTIFIED, NO PAIN PRESENT: ICD-10-PCS | Mod: HCNC,S$GLB,, | Performed by: COLON & RECTAL SURGERY

## 2020-06-08 PROCEDURE — 1101F PR PT FALLS ASSESS DOC 0-1 FALLS W/OUT INJ PAST YR: ICD-10-PCS | Mod: HCNC,CPTII,S$GLB, | Performed by: COLON & RECTAL SURGERY

## 2020-06-08 PROCEDURE — 99214 PR OFFICE/OUTPT VISIT, EST, LEVL IV, 30-39 MIN: ICD-10-PCS | Mod: HCNC,S$GLB,, | Performed by: COLON & RECTAL SURGERY

## 2020-06-08 PROCEDURE — 36430 TRANSFUSION BLD/BLD COMPNT: CPT | Mod: HCNC

## 2020-06-08 PROCEDURE — 99999 PR PBB SHADOW E&M-EST. PATIENT-LVL III: CPT | Mod: PBBFAC,HCNC,, | Performed by: COLON & RECTAL SURGERY

## 2020-06-08 PROCEDURE — 1126F AMNT PAIN NOTED NONE PRSNT: CPT | Mod: HCNC,S$GLB,, | Performed by: COLON & RECTAL SURGERY

## 2020-06-08 PROCEDURE — 1159F PR MEDICATION LIST DOCUMENTED IN MEDICAL RECORD: ICD-10-PCS | Mod: HCNC,S$GLB,, | Performed by: COLON & RECTAL SURGERY

## 2020-06-08 PROCEDURE — 3078F DIAST BP <80 MM HG: CPT | Mod: HCNC,CPTII,S$GLB, | Performed by: COLON & RECTAL SURGERY

## 2020-06-08 PROCEDURE — 11000001 HC ACUTE MED/SURG PRIVATE ROOM: Mod: HCNC

## 2020-06-08 PROCEDURE — 99499 RISK ADDL DX/OHS AUDIT: ICD-10-PCS | Mod: HCNC,S$GLB,, | Performed by: COLON & RECTAL SURGERY

## 2020-06-08 PROCEDURE — 1159F MED LIST DOCD IN RCRD: CPT | Mod: HCNC,S$GLB,, | Performed by: COLON & RECTAL SURGERY

## 2020-06-08 PROCEDURE — 99999 PR PBB SHADOW E&M-EST. PATIENT-LVL III: ICD-10-PCS | Mod: PBBFAC,HCNC,, | Performed by: COLON & RECTAL SURGERY

## 2020-06-08 PROCEDURE — 99285 EMERGENCY DEPT VISIT HI MDM: CPT | Mod: 25,HCNC

## 2020-06-08 PROCEDURE — 3078F PR MOST RECENT DIASTOLIC BLOOD PRESSURE < 80 MM HG: ICD-10-PCS | Mod: HCNC,CPTII,S$GLB, | Performed by: COLON & RECTAL SURGERY

## 2020-06-08 PROCEDURE — 99291 CRITICAL CARE FIRST HOUR: CPT | Mod: 25,HCNC

## 2020-06-08 PROCEDURE — 99214 OFFICE O/P EST MOD 30 MIN: CPT | Mod: HCNC,S$GLB,, | Performed by: COLON & RECTAL SURGERY

## 2020-06-08 PROCEDURE — 3077F PR MOST RECENT SYSTOLIC BLOOD PRESSURE >= 140 MM HG: ICD-10-PCS | Mod: HCNC,CPTII,S$GLB, | Performed by: COLON & RECTAL SURGERY

## 2020-06-08 NOTE — PROGRESS NOTES
History & Physical    SUBJECTIVE:     CC: colon adenocarcinoma  Ref MD: Rosanna Harrington MD    History of Present Illness:  Patient is a 70 y.o. male presents for evaluation colonic adenocarcinoma. Pt has a PMHx significant for anemia, GERD, HTN, HLD, CKD, CHF, CVA, CAD and PVD with previous AAA repair who recently underwent a colonoscopy on 06/02/2020 where a malignant-appearing mass was seen in the transverse colon, possible descending colon and biopsied.  Biopsies did confirm this to be adenocarcinoma.  Patient states that he had been having both hematochezia and melena since January intermittently.  Patient states that since colonoscopy and being off Plavix, he has not had any further bleeding or melanotic stools.  Patient's last colonoscopy prior to this 1 was in February 2014.  Patient has significant past surgical history including multiple previous abdominal operations including AAA repair, AAA graft excision and right ax fem bypass with small-bowel resection.  He denies current fever, chills, nausea, vomiting.  Patient reports a significant past coronary history requiring stent placement and likely need for further stent/CABG but is unable to undergo at this time due to high risk nature.    Review of patient's allergies indicates:   Allergen Reactions    Morphine Itching       Current Outpatient Medications   Medication Sig Dispense Refill    albuterol-ipratropium (DUO-NEB) 2.5 mg-0.5 mg/3 mL nebulizer solution Take 3 mLs by nebulization every 8 (eight) hours as needed for Wheezing or Shortness of Breath. Rescue 90 mL 0    amLODIPine (NORVASC) 10 MG tablet TAKE 1 TABLET EVERY DAY 90 tablet 3    atorvastatin (LIPITOR) 40 MG tablet Take 1 tablet (40 mg total) by mouth once daily. 90 tablet 3    carvedilol (COREG) 25 MG tablet Take 1 tablet (25 mg total) by mouth 2 (two) times daily with meals. 60 tablet 11    cetirizine (ZYRTEC) 10 MG tablet Take 10 mg by mouth once daily.      folic acid-vit B6-vit B12  2.5-25-2 mg (FOLBIC OR EQUIV) 2.5-25-2 mg Tab Take 1 tablet by mouth once daily. 90 tablet 3    furosemide (LASIX) 20 MG tablet Take two tab on Mon/Wed/Friday and one tab on Tues/Thurs/ Sat/Sunday 124 tablet 3    isosorbide mononitrate (IMDUR) 120 MG 24 hr tablet Take 1 tablet by mouth once daily.      mupirocin (BACTROBAN) 2 % ointment Apply topically 3 (three) times daily. 1 Tube 2    nitroglycerin (NITROSTAT) 0.6 MG Subl Place 1 tablet (0.6 mg total) under the tongue every 5 (five) minutes as needed (max 3/ per episode). 30 tablet 1    omeprazole (PRILOSEC) 20 MG capsule TAKE 1 CAPSULE TWICE DAILY 180 capsule 3    OXYGEN-AIR [place]IVERY SYSTEMS [place] [facility] [place]; Combo Route) route.      sertraline (ZOLOFT) 50 MG tablet Take 1 tablet (50 mg total) by mouth once daily. 90 tablet 11    triamcinolone acetonide 0.1% (KENALOG) 0.1 % cream Apply topically 2 (two) times daily. 1 Tube 3    aspirin (ECOTRIN) 81 MG EC tablet Take 1 tablet (81 mg total) by mouth once daily.  0    hydrALAZINE (APRESOLINE) 25 MG tablet Take 1 tablet (25 mg total) by mouth every 12 (twelve) hours. 180 tablet 3     No current facility-administered medications for this visit.        Past Medical History:   Diagnosis Date    Acute on chronic congestive heart failure 1/13/2020    Acute respiratory failure with hypoxia 1/14/2020    Analgesic nephropathy     Anemia     AP (angina pectoris) 1/11/2019    Arthritis     Colon polyp     Repeat colonoscopy due in 9/14    COPD exacerbation 2/6/2020    Coronary artery disease     Diverticulosis     colonoscopy 2/21/2014    Dysthymia 2/13/2020    Encounter for blood transfusion     GERD (gastroesophageal reflux disease)     Hemorrhoids     colonoscopy 2/21/2014    Horseshoe kidney     Hyperglycemia 3/17/2014    Hyperlipidemia     Hypertension     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    Late complications of amputation stump     rseolved with further amputation( MRSA then none  since 2014)    Lipoma of colon     colonoscopy 2/21/2014    Myocardial infarction     per patient 2000 & 9/2012    Peripheral vascular disease     Phantom limb syndrome     patient reports only intermittent not problematic, not worsening    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    Spinal cord disease     L4L5 disc    Stroke     Tobacco dependence     resolved    Ureteral stent retained      Past Surgical History:   Procedure Laterality Date    ABDOMINAL AORTIC ANEURYSM REPAIR      ABDOMINAL AORTIC ANEURYSM REPAIR  1996/2014    AMPUTATION, LOWER LIMB      AORTA - BILATERAL FEMORAL ARTERY BYPASS GRAFT  2014    Left and right leg    COLONOSCOPY N/A 6/2/2020    Procedure: COLONOSCOPY;  Surgeon: Rosanna Harrington MD;  Location: [facility] ENDO;  Service: Endoscopy;  Laterality: N/A;    CORONARY ANGIOPLASTY WITH STENT PLACEMENT  2000    Three placed in heart    CYSTOSCOPY W/ RETROGRADES Left 5/29/2018    Procedure: CYSTOSCOPY, WITH RETROGRADE PYELOGRAM;  Surgeon: Scooter Jin IV, MD;  Location: [facility] OR;  Service: Urology;  Laterality: Left;    CYSTOSCOPY W/ URETERAL STENT PLACEMENT Left 5/29/2018    Procedure: CYSTOSCOPY, WITH URETERAL STENT INSERTION;  Surgeon: Scooter Jin IV, MD;  Location: [facility] OR;  Service: Urology;  Laterality: Left;    CYSTOSCOPY W/ URETERAL STENT PLACEMENT Left 2/4/2020    Procedure: CYSTOSCOPY, WITH URETERAL STENT INSERTION;  Surgeon: Scooter Jin IV, MD;  Location: [facility] OR;  Service: Urology;  Laterality: Left;    CYSTOSCOPY W/ URETERAL STENT REMOVAL Left 5/29/2018    Procedure: CYSTOSCOPY, WITH URETERAL STENT REMOVAL;  Surgeon: Scooter Jin IV, MD;  Location: [facility] OR;  Service: Urology;  Laterality: Left;    CYSTOSCOPY W/ URETERAL STENT REMOVAL Left 2/4/2020    Procedure: CYSTOSCOPY, WITH URETERAL STENT REMOVAL;  Surgeon: Scooter Jin IV, MD;  Location: [facility] OR;  Service: Urology;  Laterality: Left;    ESOPHAGOGASTRODUODENOSCOPY N/A 6/2/2020     Procedure: EGD (ESOPHAGOGASTRODUODENOSCOPY);  Surgeon: Rosanna Harrington MD;  Location: [facility] ENDO;  Service: Endoscopy;  Laterality: N/A;    FOOT AMPUTATION THROUGH METATARSAL      left    FOOT SURGERY Bilateral     per patient multiple toe amputations prior to.  partial foot amputation:first great toe then other toes     KIDNEY SURGERY      per patient separation of horseshoe kidney @ time of AAA repair    LEFT HEART CATHETERIZATION Left 3/7/2019    Procedure: CATHETERIZATION, HEART, LEFT;  Surgeon: Adriel Boone MD;  Location: [facility] CATH LAB;  Service: Cardiology;  Laterality: Left;  630 admit for IV hydration  10am start    LUNG LOBECTOMY Right     per patient not cancer    right below knee amputation   (approx)    SMALL INTESTINE SURGERY      per patient partial @ time of aaa repair  not small bowel - large bowel bowel compromised bythtwe AAAbowel    TONSILLECTOMY   aprox    URETERAL STENT PLACEMENT Left     annually replaced since  or so  Dr Jin     Family History   Problem Relation Age of Onset    Cancer Mother         lung    COPD Mother     Heart disease Father         MI but per patient bc of old age    Diabetes Daughter     Eczema Neg Hx     Lupus Neg Hx     Psoriasis Neg Hx     Melanoma Neg Hx     Kidney disease Neg Hx     Stroke Neg Hx     Mental illness Neg Hx     Mental retardation Neg Hx     Hypertension Neg Hx     Hyperlipidemia Neg Hx     Drug abuse Neg Hx     Alcohol abuse Neg Hx     Depression Neg Hx      Social History     Tobacco Use    Smoking status: Former Smoker     Packs/day: 1.00     Years: 15.00     Pack years: 15.00     Last attempt to quit: 2009     Years since quittin.4    Smokeless tobacco: Never Used   Substance Use Topics    Alcohol use: No    Drug use: No     Comment: Is on prescription opiod, no non prescribed use        Review of Systems:  Review of Systems   Constitutional: Positive for fatigue.  Negative for activity change, appetite change, chills, fever and unexpected weight change.   HENT: Negative for congestion, ear pain, sore throat and trouble swallowing.    Eyes: Negative for pain, redness and itching.   Respiratory: Negative for cough, shortness of breath and wheezing.    Cardiovascular: Negative for chest pain, palpitations and leg swelling.   Gastrointestinal: Positive for blood in stool and diarrhea. Negative for abdominal distention, abdominal pain, anal bleeding, constipation, nausea, rectal pain and vomiting.   Endocrine: Negative for cold intolerance, heat intolerance and polyuria.   Genitourinary: Negative for dysuria, flank pain, frequency and hematuria.   Musculoskeletal: Negative for gait problem, joint swelling and neck pain.   Skin: Negative for color change, rash and wound.   Allergic/Immunologic: Negative for environmental allergies and immunocompromised state.   Neurological: Negative for dizziness, speech difficulty, weakness and numbness.   Psychiatric/Behavioral: Negative for agitation, confusion and hallucinations.       OBJECTIVE:     Vital Signs (Most Recent)  Temp: 98.1 °F (36.7 °C) (06/08/20 1009)  Pulse: 76 (06/08/20 1009)  BP: (!) 142/69 (06/08/20 1009)     79.8 kg (175 lb 14.8 oz)     Physical Exam:  Physical Exam   Constitutional: He is oriented to person, place, and time. He appears well-developed.   HENT:   Head: Normocephalic and atraumatic.   Eyes: Conjunctivae and EOM are normal.   Neck: Normal range of motion. No thyromegaly present.   Cardiovascular: Normal rate and regular rhythm.   Pulmonary/Chest: Effort normal. No respiratory distress.   Abdominal:   Soft, nontender, nondistended, well-healed previous midline incision   Musculoskeletal: Normal range of motion. He exhibits no edema or tenderness.   Neurological: He is alert and oriented to person, place, and time.   Skin: Skin is warm and dry. Capillary refill takes less than 2 seconds. No rash noted.    Psychiatric: He has a normal mood and affect.       Laboratory  Lab Results   Component Value Date    WBC 6.70 06/05/2020    HGB 9.2 (L) 06/05/2020    HCT 31.2 (L) 06/05/2020     06/05/2020    CHOL 117 (L) 04/15/2020    TRIG 57 04/15/2020    HDL 55 04/15/2020    ALT 16 06/05/2020    AST 17 06/05/2020     06/05/2020    K 5.0 06/05/2020     06/05/2020    CREATININE 2.3 (H) 06/05/2020    BUN 37 (H) 06/05/2020    CO2 20 (L) 06/05/2020    TSH 1.747 03/27/2019    PSA 0.72 05/17/2018    INR 1.1 03/27/2019    HGBA1C 4.8 04/15/2020         Diagnostic Results:  CT: Reviewed  CT:  FINDINGS:  Chest: Moderate emphysematous changes in the lung apices.  Calcified granulomas in the right upper lobe and left lower lobe.  There are calcifications or postoperative changes along the superior major fissure on the right.  Stable peripheral reticulonodular interstitial changes in the lateral and posterior right lower lobe with some minimal ground-glass opacity.  This could be related to elevation of the right hemidiaphragm.  There is a superior middle lobe 6 mm nodule on image 174 of series 4.  There is a 7 mm nodule within the posterior left upper lobe on image 106 of series 4.  There is an 8 mm nodule within the left upper lobe on image 148 of series 4.  Negative for pleural or pericardial effusions.  Calcified mediastinal lymph nodes.  Negative for abnormal lymph nodes by size criteria.  Thoracic aorta, coronary artery and great vessel atherosclerotic calcifications.    The airways are patent.  The thyroid gland is normal.  The esophagus is normal.    Abdomen:   The unenhanced liver, spleen and gallbladder appear normal.  The pancreas is unremarkable.  The adrenal glands are normal.    Again seen is fusion of the inferior poles of the bilateral kidneys.  Stable atrophy of the left kidney with multiple parapelvic cysts versus chronic left hydronephrosis in this patient who has a left ureteral stent in place.  Mild  prominence of the right renal collecting system again seen.  Negative for right hydronephrosis or discrete renal lesions otherwise.    Negative for adenopathy free fluid or inflammatory change noted within the abdomen or pelvis.    Vascular calcifications are present without aneurysmal change.  There is a right axillofemoral bypass as well as a femoral-femoral bypass present.  I cannot determine if it is patent due to the lack of IV contrast.    No obvious colonic mass is seen on this study.  Scattered colonic diverticula with mildly increased stool.  There is a area of nodularity within the 2nd portion of duodenum with the waddle diverticula present.  Small bowel is otherwise normal.  Of note, the oral contrast did not made it to the colon at the time of imaging.  Linear radiodensity is present within the cecum, which appear to be related to clips placed during endoscopy.  There is some mild thickening of the cecal wall.    Pelvis:  The urinary bladder is normal, considering a left ureteral stent is in place.  The male pelvic organs are normal.    No significant osseous abnormality is identified.  Stable anterior wedging of the L1 vertebral body.  Stable multilevel Schmorl node formation and degenerative facet arthropathy.  Fusion of the L3 through L5 vertebral bodies again seen.    Negative for groin adenopathy.      Impression       1.  There are clips within the cecum, with wall thickening present.  This possibly corresponds to the history given of colon neoplasm.  Clinical correlation is advised.    2.  There are 3 noncalcified pulmonary nodules, measuring between 6 and 8 mm in the middle lobe and left upper lobe.  Considering the patient's history, early metastasis must be considered.    3.  Negative for acute process within the chest, abdomen and pelvis.    4.  Numerous stable and nonemergent findings as described above.         PATHOLOGY:  1. Cecum, polypectomies:  Multiple fragmented pieces of tubular  adenomas.  2. Descending colon, polypectomy:  Tubular adenomas.  3. Descending colon, mass at 60 cm, biopsies:  Moderately differentiated adenocarcinoma.  Areas of exudate and ulceration are present.  Depth of invasion cannot be assessed in the superficial and fragmented biopsy specimens.  Clinical correlation is recommended.  4. Sigmoid colon, polypectomy:  Detached superficial fragments of highly atypical cells entrapped within fibrin mixed with  partially digested food particles, see comment.  Multiple levels are examined.    ASSESSMENT/PLAN:     71yo M with multiple medical comorbidities who presents with transverse vs descending colonic adenocarcinoma with possible metastatic lung disease    - long discussion with the patient and his wife regarding the diagnosis and treatment plan for colonic adenocarcinoma.  Discussed that there are some lesions in the lung which are worrisome, but are currently too small to biopsy and we would have to observe with serial imaging.  Discussed that with the absence of definitive metastatic disease, the treatment recommendation would be for surgical excision of this portion of his colon with likely plan for reanastomosis.  We did discuss that surgical intervention would be difficult given the previous multiple operations he has undergone as well as his multiple medical comorbidities and coronary issues.  - discussed that I would recommend seeing Cardiology for evaluation for preoperative clearance and optimization.  Discussed that he may require further coronary mention prior to surgery or possibly admission to the hospital preoperatively for heparin drip given that he is currently actively holding his Plavix with coronary stents in place.  Will have him evaluated by Cardiology ASAP  - if cleared by Cardiology, will likely proceed with open partial colectomy.  Discussed with him that this would depend on the location of the tumor whether this was in the transverse or the  descending colon.  However if this was near the splenic flexure at either location, will likely perform a partial left colectomy with reanastomosis.  We did discuss that the lysis of adhesion and previous operations would make this more difficult and increase the chance intraoperative/postoperative complications.  He and his wife voiced understanding of this.  - we also discussed the increased risk around the time of surgery including perioperative cardiac events, perioperative stroke and perioperative death.  - RTC 1-2 weeks for re-evaluation once seen and evaluated by Cardiology    Leonardo Yang MD  Colon and Rectal Surgery  [facility]

## 2020-06-08 NOTE — PROGRESS NOTES
Patient is scheduled with you today. The comment on jar #4 sigmoid polyp removal shows likely sample crossover from jar#3 from the colon mass. A small polyp was removed in the sigmoid colon. According to path no polyp seen only the tissue sample crossover.

## 2020-06-08 NOTE — LETTER
June 8, 2020      Rosanna Harrington MD  [address] [facility]  [place] [address]            [facility]  [id number] [place] [address] , [address]ST FLOOR  [place] [address]  Phone: [phone]  Fax: [phone]          Patient: Kodi Ribeiro   MR Number: [id number]   YOB: 1950   Date of Visit: 6/8/2020       Dear Dr. Rosanna Harrington:    Thank you for referring Kodi Ribeiro to me for evaluation. Attached you will find relevant portions of my assessment and plan of care.    If you have questions, please do not hesitate to call me. I look forward to following Kodi Ribeiro along with you.    Sincerely,    Leonardo Yang MD    Enclosure  CC:  No Recipients    If you would like to receive this communication electronically, please contact [email or web address] or [phone] to request more information on [employer] access.    For providers and/or their staff who would like to refer a patient to [facility], please contact us through our one-stop-shop provider referral line, [facility], at [phone].    If you feel you have received this communication in error or would no longer like to receive these types of communications, please e-mail [email or web address]

## 2020-06-08 NOTE — Clinical Note
This is a sohelia with extensive CAD and PCI hx, along with PVD. He has colon adenoCA and needs open resection. Plavix currently being held due to GI bleed. Need to see if he needs PCI prior to surgery or any optimization that we can do. Also do we need to admit to hospital preop for hep gtt (he's currently holding plavix and only taking 81mg ASA). He's seeing you tomorrow AM. Let me know. Thanks!- MDG

## 2020-06-09 PROBLEM — K92.2 ACUTE LOWER GI BLEEDING: Status: ACTIVE | Noted: 2020-06-09

## 2020-06-09 PROBLEM — C18.9 COLON ADENOCARCINOMA: Status: ACTIVE | Noted: 2020-06-09

## 2020-06-09 PROBLEM — J44.9 COPD (CHRONIC OBSTRUCTIVE PULMONARY DISEASE): Status: ACTIVE | Noted: 2020-06-09

## 2020-06-09 PROBLEM — R91.8 PULMONARY NODULES: Status: ACTIVE | Noted: 2020-06-09

## 2020-06-09 PROBLEM — D72.829 LEUKOCYTOSIS: Status: ACTIVE | Noted: 2020-06-09

## 2020-06-09 LAB
ABO + RH BLD: NORMAL
ALBUMIN SERPL BCP-MCNC: 3.3 G/DL (ref 3.5–5.2)
ALP SERPL-CCNC: 164 U/L (ref 55–135)
ALT SERPL W/O P-5'-P-CCNC: 14 U/L (ref 10–44)
ANION GAP SERPL CALC-SCNC: 11 MMOL/L (ref 8–16)
AORTIC ROOT ANNULUS: 2.87 CM
APTT BLDCRRT: 24.5 SEC (ref 21–32)
ASCENDING AORTA: 3.05 CM
AST SERPL-CCNC: 17 U/L (ref 10–40)
AV INDEX (PROSTH): 0.78
AV MEAN GRADIENT: 6 MMHG
AV PEAK GRADIENT: 7 MMHG
AV VALVE AREA: 2.22 CM2
AV VELOCITY RATIO: 0.56
BASOPHILS # BLD AUTO: 0.06 K/UL (ref 0–0.2)
BASOPHILS # BLD AUTO: 0.06 K/UL (ref 0–0.2)
BASOPHILS NFR BLD: 0.5 % (ref 0–1.9)
BASOPHILS NFR BLD: 0.7 % (ref 0–1.9)
BILIRUB SERPL-MCNC: 0.3 MG/DL (ref 0.1–1)
BLD GP AB SCN CELLS X3 SERPL QL: NORMAL
BLD PROD TYP BPU: NORMAL
BLOOD UNIT EXPIRATION DATE: NORMAL
BLOOD UNIT TYPE CODE: 5100
BLOOD UNIT TYPE: NORMAL
BSA FOR ECHO PROCEDURE: 2.01 M2
BUN SERPL-MCNC: 44 MG/DL (ref 8–23)
CALCIUM SERPL-MCNC: 8.2 MG/DL (ref 8.7–10.5)
CHLORIDE SERPL-SCNC: 110 MMOL/L (ref 95–110)
CO2 SERPL-SCNC: 16 MMOL/L (ref 23–29)
CODING SYSTEM: NORMAL
CREAT SERPL-MCNC: 3.2 MG/DL (ref 0.5–1.4)
CV ECHO LV RWT: 0.85 CM
DIFFERENTIAL METHOD: ABNORMAL
DIFFERENTIAL METHOD: ABNORMAL
DISPENSE STATUS: NORMAL
DOP CALC AO PEAK VEL: 1.33 M/S
DOP CALC AO VTI: 23.11 CM
DOP CALC LVOT AREA: 2.8 CM2
DOP CALC LVOT DIAMETER: 1.9 CM
DOP CALC LVOT PEAK VEL: 0.74 M/S
DOP CALC LVOT STROKE VOLUME: 51.38 CM3
DOP CALCLVOT PEAK VEL VTI: 18.13 CM
E WAVE DECELERATION TIME: 157.55 MSEC
E/A RATIO: 1.64
E/E' RATIO: 8.12 M/S
ECHO LV POSTERIOR WALL: 1.52 CM (ref 0.6–1.1)
EOSINOPHIL # BLD AUTO: 0.1 K/UL (ref 0–0.5)
EOSINOPHIL # BLD AUTO: 0.4 K/UL (ref 0–0.5)
EOSINOPHIL NFR BLD: 0.8 % (ref 0–8)
EOSINOPHIL NFR BLD: 2.9 % (ref 0–8)
ERYTHROCYTE [DISTWIDTH] IN BLOOD BY AUTOMATED COUNT: 16.2 % (ref 11.5–14.5)
ERYTHROCYTE [DISTWIDTH] IN BLOOD BY AUTOMATED COUNT: 17.1 % (ref 11.5–14.5)
EST. GFR  (AFRICAN AMERICAN): 21 ML/MIN/1.73 M^2
EST. GFR  (NON AFRICAN AMERICAN): 18 ML/MIN/1.73 M^2
FRACTIONAL SHORTENING: 28 % (ref 28–44)
GLUCOSE SERPL-MCNC: 159 MG/DL (ref 70–110)
HCT VFR BLD AUTO: 24.4 % (ref 40–54)
HCT VFR BLD AUTO: 25.7 % (ref 40–54)
HCT VFR BLD AUTO: 26 % (ref 40–54)
HCT VFR BLD AUTO: 26.5 % (ref 40–54)
HGB BLD-MCNC: 7.5 G/DL (ref 14–18)
HGB BLD-MCNC: 7.9 G/DL (ref 14–18)
HGB BLD-MCNC: 7.9 G/DL (ref 14–18)
HGB BLD-MCNC: 8.2 G/DL (ref 14–18)
IMM GRANULOCYTES # BLD AUTO: 0.03 K/UL (ref 0–0.04)
IMM GRANULOCYTES # BLD AUTO: 0.06 K/UL (ref 0–0.04)
IMM GRANULOCYTES NFR BLD AUTO: 0.3 % (ref 0–0.5)
IMM GRANULOCYTES NFR BLD AUTO: 0.5 % (ref 0–0.5)
INR PPP: 1 (ref 0.8–1.2)
INTERVENTRICULAR SEPTUM: 1.71 CM (ref 0.6–1.1)
LEFT INTERNAL DIMENSION IN SYSTOLE: 2.55 CM (ref 2.1–4)
LEFT VENTRICLE DIASTOLIC VOLUME INDEX: 26.27 ML/M2
LEFT VENTRICLE DIASTOLIC VOLUME: 52.87 ML
LEFT VENTRICLE MASS INDEX: 111 G/M2
LEFT VENTRICLE SYSTOLIC VOLUME INDEX: 11.6 ML/M2
LEFT VENTRICLE SYSTOLIC VOLUME: 23.35 ML
LEFT VENTRICULAR INTERNAL DIMENSION IN DIASTOLE: 3.56 CM (ref 3.5–6)
LEFT VENTRICULAR MASS: 223.52 G
LV LATERAL E/E' RATIO: 7.67 M/S
LV SEPTAL E/E' RATIO: 8.63 M/S
LYMPHOCYTES # BLD AUTO: 1.2 K/UL (ref 1–4.8)
LYMPHOCYTES # BLD AUTO: 1.3 K/UL (ref 1–4.8)
LYMPHOCYTES NFR BLD: 10.3 % (ref 18–48)
LYMPHOCYTES NFR BLD: 14.3 % (ref 18–48)
MCH RBC QN AUTO: 25.7 PG (ref 27–31)
MCH RBC QN AUTO: 26.2 PG (ref 27–31)
MCHC RBC AUTO-ENTMCNC: 29.8 G/DL (ref 32–36)
MCHC RBC AUTO-ENTMCNC: 30.7 G/DL (ref 32–36)
MCV RBC AUTO: 85 FL (ref 82–98)
MCV RBC AUTO: 86 FL (ref 82–98)
MONOCYTES # BLD AUTO: 0.8 K/UL (ref 0.3–1)
MONOCYTES # BLD AUTO: 1 K/UL (ref 0.3–1)
MONOCYTES NFR BLD: 7.3 % (ref 4–15)
MONOCYTES NFR BLD: 9.7 % (ref 4–15)
MV PEAK A VEL: 0.42 M/S
MV PEAK E VEL: 0.69 M/S
NEUTROPHILS # BLD AUTO: 10.2 K/UL (ref 1.8–7.7)
NEUTROPHILS # BLD AUTO: 6.4 K/UL (ref 1.8–7.7)
NEUTROPHILS NFR BLD: 74.2 % (ref 38–73)
NEUTROPHILS NFR BLD: 78.5 % (ref 38–73)
NRBC BLD-RTO: 0 /100 WBC
NRBC BLD-RTO: 0 /100 WBC
NUM UNITS TRANS PACKED RBC: NORMAL
OB PNL STL: POSITIVE
PLATELET # BLD AUTO: 237 K/UL (ref 150–350)
PLATELET # BLD AUTO: 253 K/UL (ref 150–350)
PMV BLD AUTO: 10.2 FL (ref 9.2–12.9)
PMV BLD AUTO: 10.6 FL (ref 9.2–12.9)
POTASSIUM SERPL-SCNC: 4.5 MMOL/L (ref 3.5–5.1)
PROCALCITONIN SERPL IA-MCNC: 0.18 NG/ML
PROT SERPL-MCNC: 7.4 G/DL (ref 6–8.4)
PROTHROMBIN TIME: 10.9 SEC (ref 9–12.5)
RBC # BLD AUTO: 2.86 M/UL (ref 4.6–6.2)
RBC # BLD AUTO: 3.07 M/UL (ref 4.6–6.2)
SARS-COV-2 RDRP RESP QL NAA+PROBE: NEGATIVE
SINUS: 3.33 CM
SODIUM SERPL-SCNC: 137 MMOL/L (ref 136–145)
STJ: 2.96 CM
TDI LATERAL: 0.09 M/S
TDI SEPTAL: 0.08 M/S
TDI: 0.09 M/S
TROPONIN I SERPL DL<=0.01 NG/ML-MCNC: 0.02 NG/ML (ref 0–0.03)
TROPONIN I SERPL DL<=0.01 NG/ML-MCNC: 0.02 NG/ML (ref 0–0.03)
WBC # BLD AUTO: 12.96 K/UL (ref 3.9–12.7)
WBC # BLD AUTO: 8.68 K/UL (ref 3.9–12.7)

## 2020-06-09 PROCEDURE — 84484 ASSAY OF TROPONIN QUANT: CPT | Mod: 91,HCNC

## 2020-06-09 PROCEDURE — 63600175 PHARM REV CODE 636 W HCPCS: Mod: HCNC | Performed by: NURSE PRACTITIONER

## 2020-06-09 PROCEDURE — 63600175 PHARM REV CODE 636 W HCPCS: Mod: HCNC | Performed by: EMERGENCY MEDICINE

## 2020-06-09 PROCEDURE — 96374 THER/PROPH/DIAG INJ IV PUSH: CPT | Mod: HCNC

## 2020-06-09 PROCEDURE — 21400001 HC TELEMETRY ROOM: Mod: HCNC

## 2020-06-09 PROCEDURE — 25000003 PHARM REV CODE 250: Mod: HCNC | Performed by: NURSE PRACTITIONER

## 2020-06-09 PROCEDURE — 93005 ELECTROCARDIOGRAM TRACING: CPT | Mod: HCNC

## 2020-06-09 PROCEDURE — 85025 COMPLETE CBC W/AUTO DIFF WBC: CPT | Mod: HCNC

## 2020-06-09 PROCEDURE — 99232 PR SUBSEQUENT HOSPITAL CARE,LEVL II: ICD-10-PCS | Mod: HCNC,,, | Performed by: COLON & RECTAL SURGERY

## 2020-06-09 PROCEDURE — 25000003 PHARM REV CODE 250: Mod: HCNC | Performed by: EMERGENCY MEDICINE

## 2020-06-09 PROCEDURE — 93010 ELECTROCARDIOGRAM REPORT: CPT | Mod: HCNC,,, | Performed by: INTERNAL MEDICINE

## 2020-06-09 PROCEDURE — 11000001 HC ACUTE MED/SURG PRIVATE ROOM: Mod: HCNC

## 2020-06-09 PROCEDURE — 86850 RBC ANTIBODY SCREEN: CPT | Mod: HCNC

## 2020-06-09 PROCEDURE — 85014 HEMATOCRIT: CPT | Mod: 91,HCNC

## 2020-06-09 PROCEDURE — 36430 TRANSFUSION BLD/BLD COMPNT: CPT | Mod: HCNC

## 2020-06-09 PROCEDURE — 99223 PR INITIAL HOSPITAL CARE,LEVL III: ICD-10-PCS | Mod: HCNC,25,, | Performed by: INTERNAL MEDICINE

## 2020-06-09 PROCEDURE — 96376 TX/PRO/DX INJ SAME DRUG ADON: CPT

## 2020-06-09 PROCEDURE — 82272 OCCULT BLD FECES 1-3 TESTS: CPT | Mod: HCNC

## 2020-06-09 PROCEDURE — C9113 INJ PANTOPRAZOLE SODIUM, VIA: HCPCS | Mod: HCNC | Performed by: COLON & RECTAL SURGERY

## 2020-06-09 PROCEDURE — 80053 COMPREHEN METABOLIC PANEL: CPT | Mod: HCNC

## 2020-06-09 PROCEDURE — 84145 PROCALCITONIN (PCT): CPT | Mod: HCNC

## 2020-06-09 PROCEDURE — 36415 COLL VENOUS BLD VENIPUNCTURE: CPT | Mod: HCNC

## 2020-06-09 PROCEDURE — 86920 COMPATIBILITY TEST SPIN: CPT | Mod: HCNC

## 2020-06-09 PROCEDURE — 99232 SBSQ HOSP IP/OBS MODERATE 35: CPT | Mod: HCNC,,, | Performed by: COLON & RECTAL SURGERY

## 2020-06-09 PROCEDURE — 96374 THER/PROPH/DIAG INJ IV PUSH: CPT | Mod: 59

## 2020-06-09 PROCEDURE — 85730 THROMBOPLASTIN TIME PARTIAL: CPT | Mod: HCNC

## 2020-06-09 PROCEDURE — 96361 HYDRATE IV INFUSION ADD-ON: CPT | Mod: HCNC

## 2020-06-09 PROCEDURE — 93010 EKG 12-LEAD: ICD-10-PCS | Mod: HCNC,,, | Performed by: INTERNAL MEDICINE

## 2020-06-09 PROCEDURE — 85610 PROTHROMBIN TIME: CPT | Mod: HCNC

## 2020-06-09 PROCEDURE — 63600175 PHARM REV CODE 636 W HCPCS: Mod: HCNC | Performed by: COLON & RECTAL SURGERY

## 2020-06-09 PROCEDURE — 99223 1ST HOSP IP/OBS HIGH 75: CPT | Mod: HCNC,25,, | Performed by: INTERNAL MEDICINE

## 2020-06-09 PROCEDURE — C9113 INJ PANTOPRAZOLE SODIUM, VIA: HCPCS | Mod: HCNC | Performed by: NURSE PRACTITIONER

## 2020-06-09 PROCEDURE — P9016 RBC LEUKOCYTES REDUCED: HCPCS | Mod: HCNC

## 2020-06-09 PROCEDURE — U0002 COVID-19 LAB TEST NON-CDC: HCPCS | Mod: HCNC

## 2020-06-09 PROCEDURE — 85018 HEMOGLOBIN: CPT | Mod: 91,HCNC

## 2020-06-09 RX ORDER — ONDANSETRON 2 MG/ML
4 INJECTION INTRAMUSCULAR; INTRAVENOUS EVERY 8 HOURS PRN
Status: DISCONTINUED | OUTPATIENT
Start: 2020-06-09 | End: 2020-06-17 | Stop reason: HOSPADM

## 2020-06-09 RX ORDER — HYDROCODONE BITARTRATE AND ACETAMINOPHEN 500; 5 MG/1; MG/1
TABLET ORAL
Status: DISCONTINUED | OUTPATIENT
Start: 2020-06-09 | End: 2020-06-16 | Stop reason: SDUPTHER

## 2020-06-09 RX ORDER — DIPHENHYDRAMINE HCL 25 MG
25 CAPSULE ORAL EVERY 6 HOURS PRN
Status: DISCONTINUED | OUTPATIENT
Start: 2020-06-09 | End: 2020-06-17 | Stop reason: HOSPADM

## 2020-06-09 RX ORDER — IPRATROPIUM BROMIDE AND ALBUTEROL SULFATE 2.5; .5 MG/3ML; MG/3ML
3 SOLUTION RESPIRATORY (INHALATION) EVERY 6 HOURS PRN
Status: DISCONTINUED | OUTPATIENT
Start: 2020-06-09 | End: 2020-06-17 | Stop reason: HOSPADM

## 2020-06-09 RX ORDER — PANTOPRAZOLE SODIUM 40 MG/10ML
40 INJECTION, POWDER, LYOPHILIZED, FOR SOLUTION INTRAVENOUS DAILY
Status: DISCONTINUED | OUTPATIENT
Start: 2020-06-09 | End: 2020-06-16

## 2020-06-09 RX ORDER — ACETAMINOPHEN 325 MG/1
650 TABLET ORAL EVERY 6 HOURS PRN
Status: DISCONTINUED | OUTPATIENT
Start: 2020-06-09 | End: 2020-06-17 | Stop reason: HOSPADM

## 2020-06-09 RX ORDER — ONDANSETRON 2 MG/ML
8 INJECTION INTRAMUSCULAR; INTRAVENOUS
Status: COMPLETED | OUTPATIENT
Start: 2020-06-09 | End: 2020-06-09

## 2020-06-09 RX ORDER — HYDROCODONE BITARTRATE AND ACETAMINOPHEN 5; 325 MG/1; MG/1
1 TABLET ORAL EVERY 6 HOURS PRN
Status: DISCONTINUED | OUTPATIENT
Start: 2020-06-09 | End: 2020-06-11

## 2020-06-09 RX ADMIN — SODIUM CHLORIDE 1000 ML: 0.9 INJECTION, SOLUTION INTRAVENOUS at 09:06

## 2020-06-09 RX ADMIN — PANTOPRAZOLE SODIUM 40 MG: 40 INJECTION, POWDER, LYOPHILIZED, FOR SOLUTION INTRAVENOUS at 12:06

## 2020-06-09 RX ADMIN — HYDROCODONE BITARTRATE AND ACETAMINOPHEN 1 TABLET: 5; 325 TABLET ORAL at 01:06

## 2020-06-09 RX ADMIN — ONDANSETRON HYDROCHLORIDE 4 MG: 2 INJECTION, SOLUTION INTRAMUSCULAR; INTRAVENOUS at 11:06

## 2020-06-09 RX ADMIN — ONDANSETRON HYDROCHLORIDE 8 MG: 2 INJECTION, SOLUTION INTRAMUSCULAR; INTRAVENOUS at 09:06

## 2020-06-09 NOTE — ASSESSMENT & PLAN NOTE
-ASA and Plavix held due to bleeding   -will resume Coreg when appropriate   -Statin and Imdur continued   -s/p stents x 3 in 2000 and MI

## 2020-06-09 NOTE — ASSESSMENT & PLAN NOTE
-likely reactive in the setting of active bleed   -resolved upon repeat CBC post transfusion   -monitor   -repeat CBC in am

## 2020-06-09 NOTE — ED PROVIDER NOTES
SCRIBE #1 NOTE: ILeonardo, am scribing for, and in the presence of, Italia George MD. I have scribed the HPI, ROS and PEx.     SCRIBE #2 NOTE: I, Leonardo Ball, am scribing for, and in the presence of,  Sindi Samson MD. I have scribed the remaining portions of the note not scribed by Scribe #1.      History     Chief Complaint   Patient presents with    Rectal Bleeding     bloody stool this pm     Review of patient's allergies indicates:   Allergen Reactions    Morphine Itching         History of Present Illness     HPI    6/9/2020, 12:13 AM  History obtained from the patient      History of Present Illness: Kodi Ribeiro is a 70 y.o. male patient with a PMHx of acute on chronic congestive heart failure, acute respiratory failure with hypoxia, COPD exacerbation, CAD, diverticulosis, GERD, hyperglycemia, HLD, HTN,  and MI who presents to the Emergency Department for evaluation of rectal bleeding which onset gradually earlier today. Pt reports having had a bloody stool this morning. Pt states that he was on Plavix, but was stopped by his PCP 10 days ago when he had similar sxs. Pt had a colonoscopy last Tuesday and today was dx'd with colon cancer. Symptoms are intermittent and moderate in severity. No mitigating or exacerbating factors reported. Associated sxs include constipation, hematochezia, abdominal pain when making a BM, diaphoresis, chills, and syncope. Patient denies any CP, hematuria, N/V, SOB, light-headedness, and all other sxs at this time. No prior Tx reported. No further complaints or concerns at this time.       Arrival mode: EMS    PCP: Norma Nuñez MD        Past Medical History:  Past Medical History:   Diagnosis Date    Acute on chronic congestive heart failure 1/13/2020    Acute respiratory failure with hypoxia 1/14/2020    Analgesic nephropathy     Anemia     AP (angina pectoris) 1/11/2019    Arthritis     Colon polyp     Repeat colonoscopy due in 9/14    COPD  exacerbation 2/6/2020    Coronary artery disease     Diverticulosis     colonoscopy 2/21/2014    Dysthymia 2/13/2020    Encounter for blood transfusion     GERD (gastroesophageal reflux disease)     Hemorrhoids     colonoscopy 2/21/2014    Horseshoe kidney     Hyperglycemia 3/17/2014    Hyperlipidemia     Hypertension     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    Late complications of amputation stump     rseolved with further amputation( MRSA then none since 2014)    Lipoma of colon     colonoscopy 2/21/2014    Myocardial infarction     per patient 2000 & 9/2012    Peripheral vascular disease     Phantom limb syndrome     patient reports only intermittent not problematic, not worsening    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    Spinal cord disease     L4L5 disc    Stroke     Tobacco dependence     resolved    Ureteral stent retained        Past Surgical History:  Past Surgical History:   Procedure Laterality Date    ABDOMINAL AORTIC ANEURYSM REPAIR      ABDOMINAL AORTIC ANEURYSM REPAIR  1996/2014    AMPUTATION, LOWER LIMB      AORTA - BILATERAL FEMORAL ARTERY BYPASS GRAFT  2014    Left and right leg    COLONOSCOPY N/A 6/2/2020    Procedure: COLONOSCOPY;  Surgeon: Rosanna Harrington MD;  Location: [facility];  Service: Endoscopy;  Laterality: N/A;    CORONARY ANGIOPLASTY WITH STENT PLACEMENT  2000    Three placed in heart    CYSTOSCOPY W/ RETROGRADES Left 5/29/2018    Procedure: CYSTOSCOPY, WITH RETROGRADE PYELOGRAM;  Surgeon: Scooter Jin IV, MD;  Location: [facility];  Service: Urology;  Laterality: Left;    CYSTOSCOPY W/ URETERAL STENT PLACEMENT Left 5/29/2018    Procedure: CYSTOSCOPY, WITH URETERAL STENT INSERTION;  Surgeon: Scooter Jin IV, MD;  Location: [facility];  Service: Urology;  Laterality: Left;    CYSTOSCOPY W/ URETERAL STENT PLACEMENT Left 2/4/2020    Procedure: CYSTOSCOPY, WITH URETERAL STENT INSERTION;  Surgeon: Scooter Jin IV, MD;  Location: [facility];   Service: Urology;  Laterality: Left;    CYSTOSCOPY W/ URETERAL STENT REMOVAL Left 5/29/2018    Procedure: CYSTOSCOPY, WITH URETERAL STENT REMOVAL;  Surgeon: Scooter Jin IV, MD;  Location: [facility] OR;  Service: Urology;  Laterality: Left;    CYSTOSCOPY W/ URETERAL STENT REMOVAL Left 2/4/2020    Procedure: CYSTOSCOPY, WITH URETERAL STENT REMOVAL;  Surgeon: Scooter Jin IV, MD;  Location: [facility] OR;  Service: Urology;  Laterality: Left;    ESOPHAGOGASTRODUODENOSCOPY N/A 6/2/2020    Procedure: EGD (ESOPHAGOGASTRODUODENOSCOPY);  Surgeon: Rosanna Harrington MD;  Location: [facility] ENDO;  Service: Endoscopy;  Laterality: N/A;    FOOT AMPUTATION THROUGH METATARSAL  1996    left    FOOT SURGERY Bilateral 1980's    per patient multiple toe amputations prior to.  partial foot amputation:first great toe then other toes     KIDNEY SURGERY  2014    per patient separation of horseshoe kidney @ time of AAA repair    LEFT HEART CATHETERIZATION Left 3/7/2019    Procedure: CATHETERIZATION, HEART, LEFT;  Surgeon: Adriel Boone MD;  Location: [facility] CATH LAB;  Service: Cardiology;  Laterality: Left;  630 admit for IV hydration  10am start    LUNG LOBECTOMY Right 1970s    per patient not cancer    right below knee amputation  2009 (approx)    SMALL INTESTINE SURGERY  2014    per patient partial @ time of aaa repair  not small bowel - large bowel bowel compromised bythtwe AAAbowel    TONSILLECTOMY  1955 aprox    URETERAL STENT PLACEMENT Left     annually replaced since 2012 or so  Dr Jin         Family History:  Family History   Problem Relation Age of Onset    Cancer Mother         lung    COPD Mother     Heart disease Father         MI but per patient bc of old age    Diabetes Daughter     Eczema Neg Hx     Lupus Neg Hx     Psoriasis Neg Hx     Melanoma Neg Hx     Kidney disease Neg Hx     Stroke Neg Hx     Mental illness Neg Hx     Mental retardation Neg Hx     Hypertension Neg Hx     Hyperlipidemia Neg  Hx     Drug abuse Neg Hx     Alcohol abuse Neg Hx     Depression Neg Hx        Social History:  Social History     Tobacco Use    Smoking status: Former Smoker     Packs/day: 1.00     Years: 15.00     Pack years: 15.00     Last attempt to quit: 2009     Years since quittin.4    Smokeless tobacco: Never Used   Substance and Sexual Activity    Alcohol use: No    Drug use: No     Comment: Is on prescription opiod, no non prescribed use    Sexual activity: Not Currently     Partners: Female        Review of Systems     Review of Systems   Constitutional: Positive for chills and diaphoresis. Negative for fever.   HENT: Negative for sore throat.    Respiratory: Negative for cough and shortness of breath.    Cardiovascular: Negative for chest pain and leg swelling.   Gastrointestinal: Positive for abdominal pain (When making BM), anal bleeding and blood in stool. Negative for diarrhea, nausea and vomiting.   Genitourinary: Negative for dysuria and hematuria.   Musculoskeletal: Negative for back pain, neck pain and neck stiffness.   Skin: Negative for rash and wound.   Neurological: Positive for light-headedness. Negative for dizziness, syncope, weakness, numbness and headaches.   Hematological: Does not bruise/bleed easily.   All other systems reviewed and are negative.     Physical Exam     Initial Vitals [20 2347]   BP Pulse Resp Temp SpO2   112/70 73 16 97.9 °F (36.6 °C) 97 %      MAP       --          Physical Exam  Nursing Notes and Vital Signs Reviewed.  Constitutional: Patient is in no acute distress. Well-developed and well-nourished.  Head: Atraumatic. Normocephalic.  Eyes: PERRL. EOM intact. No scleral icterus.  ENT: Mucous membranes are moist. Oropharynx is clear and symmetric.    Neck: Supple. Full ROM.  Cardiovascular: Regular rate. Regular rhythm. No murmurs, rubs, or gallops. Distal pulses are 2+ and symmetric.  Pulmonary/Chest: No respiratory distress. Clear to auscultation  bilaterally. No wheezing or rales.  Abdominal: Soft and non-distended. There is no tenderness to palpation. No rebound or guarding.  Genitourinary: No CVA tenderness  Musculoskeletal: Moves all extremities. No obvious deformities. No edema. No calf tenderness.  Skin: Warm and dry. Pale.  Neurological:  Alert, awake, and appropriate.  Normal speech.  No acute focal neurological deficits are appreciated.  Psychiatric: Normal affect. Good eye contact. Appropriate in content.     ED Course   Critical Care  Date/Time: 6/9/2020 9:03 AM  Performed by: Sindi Samson MD  Authorized by: Sindi Samson MD   Direct patient critical care time: 25 minutes  Additional history critical care time: 10 minutes  Ordering / reviewing critical care time: 8 minutes  Documentation critical care time: 10 minutes  Consulting other physicians critical care time: 8 minutes  Total critical care time (exclusive of procedural time) : 61 minutes  Critical care time was exclusive of separately billable procedures and treating other patients and teaching time.  Critical care was necessary to treat or prevent imminent or life-threatening deterioration of the following conditions: dehydration and circulatory failure (Symptomatic anemia, acute lower GI bleeding).  Critical care was time spent personally by me on the following activities: blood draw for specimens, development of treatment plan with patient or surrogate, discussions with consultants, interpretation of cardiac output measurements, evaluation of patient's response to treatment, examination of patient, obtaining history from patient or surrogate, ordering and performing treatments and interventions, ordering and review of laboratory studies, ordering and review of radiographic studies, pulse oximetry, re-evaluation of patient's condition and review of old charts.        ED Vital Signs:  Vitals:    06/09/20 0830 06/09/20 0856 06/09/20 0858 06/09/20 0919   BP: (!) 146/82 118/69  (!) 87/56 128/68   Pulse: 80 85 90 92   Resp: 18 18 (!) 22 (!) 22   Temp:       TempSrc:       SpO2: 99%  100% 100%   Weight:       Height:        06/09/20 0930 06/09/20 0945 06/09/20 1000 06/09/20 1015   BP: 128/68 133/67 133/68 118/60   Pulse: 79 77 77 77   Resp: 16 18 15 12   Temp:       TempSrc:       SpO2: 97% 98% 99% 97%   Weight:       Height:        06/09/20 1113 06/09/20 1125 06/09/20 1130 06/09/20 1140   BP: 118/60 (!) 104/59 105/64 109/64   Pulse: 92 86 84 85   Resp: (!) 22 20 (!) 22 13   Temp: 97.9 °F (36.6 °C) 97.9 °F (36.6 °C)  97.8 °F (36.6 °C)   TempSrc:  Oral  Oral   SpO2: 100% 100% 100% 100%   Weight:  79.4 kg (175 lb)     Height:  6' (1.829 m)      06/09/20 1145 06/09/20 1202 06/09/20 1208   BP: 109/64 (!) 94/56 (!) 118/58   Pulse: 81 87    Resp: 10 18    Temp:  97.6 °F (36.4 °C)    TempSrc:  Oral    SpO2: 100% 100%    Weight:      Height:          Abnormal Lab Results:  Labs Reviewed   COMPREHENSIVE METABOLIC PANEL - Abnormal; Notable for the following components:       Result Value    CO2 16 (*)     Glucose 159 (*)     BUN, Bld 44 (*)     Creatinine 3.2 (*)     Calcium 8.2 (*)     Albumin 3.3 (*)     Alkaline Phosphatase 164 (*)     eGFR if  21 (*)     eGFR if non  18 (*)     All other components within normal limits   CBC W/ AUTO DIFFERENTIAL - Abnormal; Notable for the following components:    WBC 12.96 (*)     RBC 3.07 (*)     Hemoglobin 7.9 (*)     Hematocrit 26.5 (*)     Mean Corpuscular Hemoglobin 25.7 (*)     Mean Corpuscular Hemoglobin Conc 29.8 (*)     RDW 17.1 (*)     Gran # (ANC) 10.2 (*)     Immature Grans (Abs) 0.06 (*)     Gran% 78.5 (*)     Lymph% 10.3 (*)     All other components within normal limits   OCCULT BLOOD X 1, STOOL - Abnormal; Notable for the following components:    Occult Blood Positive (*)     All other components within normal limits   CBC W/ AUTO DIFFERENTIAL - Abnormal; Notable for the following components:    RBC 2.86 (*)      Hemoglobin 7.5 (*)     Hematocrit 24.4 (*)     Mean Corpuscular Hemoglobin 26.2 (*)     Mean Corpuscular Hemoglobin Conc 30.7 (*)     RDW 16.2 (*)     Gran% 74.2 (*)     Lymph% 14.3 (*)     All other components within normal limits   APTT   PROTIME-INR   PROCALCITONIN   PROCALCITONIN   SARS-COV-2 RNA AMPLIFICATION, QUAL   HEMOGLOBIN   HEMATOCRIT   TROPONIN I   TYPE & SCREEN   PREPARE RBC SOFT   PREPARE RBC SOFT        All Lab Results:  Results for orders placed or performed during the hospital encounter of 06/08/20   APTT   Result Value Ref Range    aPTT 24.5 21.0 - 32.0 sec   Comprehensive metabolic panel   Result Value Ref Range    Sodium 137 136 - 145 mmol/L    Potassium 4.5 3.5 - 5.1 mmol/L    Chloride 110 95 - 110 mmol/L    CO2 16 (L) 23 - 29 mmol/L    Glucose 159 (H) 70 - 110 mg/dL    BUN, Bld 44 (H) 8 - 23 mg/dL    Creatinine 3.2 (H) 0.5 - 1.4 mg/dL    Calcium 8.2 (L) 8.7 - 10.5 mg/dL    Total Protein 7.4 6.0 - 8.4 g/dL    Albumin 3.3 (L) 3.5 - 5.2 g/dL    Total Bilirubin 0.3 0.1 - 1.0 mg/dL    Alkaline Phosphatase 164 (H) 55 - 135 U/L    AST 17 10 - 40 U/L    ALT 14 10 - 44 U/L    Anion Gap 11 8 - 16 mmol/L    eGFR if African American 21 (A) >60 mL/min/1.73 m^2    eGFR if non African American 18 (A) >60 mL/min/1.73 m^2   CBC auto differential   Result Value Ref Range    WBC 12.96 (H) 3.90 - 12.70 K/uL    RBC 3.07 (L) 4.60 - 6.20 M/uL    Hemoglobin 7.9 (L) 14.0 - 18.0 g/dL    Hematocrit 26.5 (L) 40.0 - 54.0 %    Mean Corpuscular Volume 86 82 - 98 fL    Mean Corpuscular Hemoglobin 25.7 (L) 27.0 - 31.0 pg    Mean Corpuscular Hemoglobin Conc 29.8 (L) 32.0 - 36.0 g/dL    RDW 17.1 (H) 11.5 - 14.5 %    Platelets 253 150 - 350 K/uL    MPV 10.2 9.2 - 12.9 fL    Immature Granulocytes 0.5 0.0 - 0.5 %    Gran # (ANC) 10.2 (H) 1.8 - 7.7 K/uL    Immature Grans (Abs) 0.06 (H) 0.00 - 0.04 K/uL    Lymph # 1.3 1.0 - 4.8 K/uL    Mono # 1.0 0.3 - 1.0 K/uL    Eos # 0.4 0.0 - 0.5 K/uL    Baso # 0.06 0.00 - 0.20 K/uL    nRBC 0 0  /100 WBC    Gran% 78.5 (H) 38.0 - 73.0 %    Lymph% 10.3 (L) 18.0 - 48.0 %    Mono% 7.3 4.0 - 15.0 %    Eosinophil% 2.9 0.0 - 8.0 %    Basophil% 0.5 0.0 - 1.9 %    Differential Method Automated    Protime-INR   Result Value Ref Range    Prothrombin Time 10.9 9.0 - 12.5 sec    INR 1.0 0.8 - 1.2   Occult blood x 1, stool   Result Value Ref Range    Occult Blood Positive (A) Negative   Procalcitonin   Result Value Ref Range    Procalcitonin 0.18 <0.25 ng/mL   CBC auto differential   Result Value Ref Range    WBC 8.68 3.90 - 12.70 K/uL    RBC 2.86 (L) 4.60 - 6.20 M/uL    Hemoglobin 7.5 (L) 14.0 - 18.0 g/dL    Hematocrit 24.4 (L) 40.0 - 54.0 %    Mean Corpuscular Volume 85 82 - 98 fL    Mean Corpuscular Hemoglobin 26.2 (L) 27.0 - 31.0 pg    Mean Corpuscular Hemoglobin Conc 30.7 (L) 32.0 - 36.0 g/dL    RDW 16.2 (H) 11.5 - 14.5 %    Platelets 237 150 - 350 K/uL    MPV 10.6 9.2 - 12.9 fL    Immature Granulocytes 0.3 0.0 - 0.5 %    Gran # (ANC) 6.4 1.8 - 7.7 K/uL    Immature Grans (Abs) 0.03 0.00 - 0.04 K/uL    Lymph # 1.2 1.0 - 4.8 K/uL    Mono # 0.8 0.3 - 1.0 K/uL    Eos # 0.1 0.0 - 0.5 K/uL    Baso # 0.06 0.00 - 0.20 K/uL    nRBC 0 0 /100 WBC    Gran% 74.2 (H) 38.0 - 73.0 %    Lymph% 14.3 (L) 18.0 - 48.0 %    Mono% 9.7 4.0 - 15.0 %    Eosinophil% 0.8 0.0 - 8.0 %    Basophil% 0.7 0.0 - 1.9 %    Differential Method Automated    COVID-19 Rapid Screening   Result Value Ref Range    SARS-CoV-2 RNA, Amplification, Qual Negative Negative   Echo   Result Value Ref Range    BSA 2.01 m2    TDI SEPTAL 0.08 m/s    LV LATERAL E/E' RATIO 7.67 m/s    LV SEPTAL E/E' RATIO 8.63 m/s    TDI LATERAL 0.09 m/s    LVIDD 3.56 3.5 - 6.0 cm    IVS 1.71 (A) 0.6 - 1.1 cm    PW 1.52 (A) 0.6 - 1.1 cm    Ao root annulus 2.87 cm    LVIDS 2.55 2.1 - 4.0 cm    FS 28 28 - 44 %    Sinus 3.33 cm    STJ 2.96 cm    Ascending aorta 3.05 cm    LV mass 223.52 g    Left Ventricle Relative Wall Thickness 0.85 cm    AV mean gradient 6 mmHg    AV valve area 2.22 cm2     AV Velocity Ratio 0.56     AV index (prosthetic) 0.78     E/A ratio 1.64     Mean e' 0.09 m/s    E wave decelartion time 157.55 msec    LVOT diameter 1.90 cm    LVOT area 2.8 cm2    LVOT peak kenji 0.74 m/s    LVOT peak VTI 18.13 cm    Ao peak kenji 1.33 m/s    Ao VTI 23.11 cm    LVOT stroke volume 51.38 cm3    AV peak gradient 7 mmHg    E/E' ratio 8.12 m/s    MV Peak E Kenji 0.69 m/s    MV Peak A Kenji 0.42 m/s    LV Systolic Volume 23.35 mL    LV Systolic Volume Index 11.6 mL/m2    LV Diastolic Volume 52.87 mL    LV Diastolic Volume Index 26.27 mL/m2    LV Mass Index 111 g/m2   Type & Screen   Result Value Ref Range    Group & Rh O POS     Indirect Yani NEG    Prepare RBC 1 Unit   Result Value Ref Range    UNIT NUMBER [id number]     Product Code I6400B17     DISPENSE STATUS ISSUED     CODING SYSTEM [id number]     Unit Blood Type Code 5100     Unit Blood Type O POS     Unit Expiration [id number]    Prepare RBC 2 Units; active gi bleed   Result Value Ref Range    UNIT NUMBER [id number]     Product Code P5743D30     DISPENSE STATUS ISSUED     CODING SYSTEM [id number]     Unit Blood Type Code 5100     Unit Blood Type O POS     Unit Expiration [id number]     UNIT NUMBER [id number]     Product Code B1132N30     DISPENSE STATUS CROSSMATCHED     CODING SYSTEM [id number]     Unit Blood Type Code 5100     Unit Blood Type O POS     Unit Expiration [id number]        Imaging Results:  Imaging Results          CT Abdomen Pelvis  Without Contrast (Final result)  Result time 06/09/20 07:45:18   Procedure changed from CT Abdomen Pelvis With Contrast     Final result by Aurelio Curtis MD (06/09/20 07:45:18)                 Impression:      Overall findings are stable compared to prior exam.    All CT scans at this facility use dose modulation, iterative reconstruction, and/or weight based dosing when appropriate to reduce radiation dose to as low as reasonable achievable.      Electronically signed by: Aurelio Curtis  MD  Date:    06/09/2020  Time:    07:45             Narrative:    EXAMINATION:  CT ABDOMEN PELVIS WITHOUT CONTRAST    CLINICAL HISTORY:  GI bleeding;Infection, abdomen-pelvis;recently diagnoised with colon CA, bloody stools today; drop in hb;    TECHNIQUE:  Low dose axial images, sagittal and coronal reformations were obtained from the lung bases to the pubic symphysis.  Oral contrast was not administered.    COMPARISON:  06/05/2020    FINDINGS:  Heart: Normal size. No effusion.    Lung Bases: Emphysematous changes are noted.  Stable reticulonodular opacities within the right lower lobe    Liver: Normal size and attenuation. No focal lesions.    Gallbladder: No calcified gallstones.    Bile Ducts: No dilatation.    Pancreas: No obvious mass. No peripancreatic fat stranding.    Spleen: Normal.    Adrenals: Normal.    Kidneys/Ureters: Kidneys are atrophic.  Probable nonobstructing stone lower pole right kidney.  Horseshoe kidney configuration noted.  Moderate perinephric stranding is seen bilaterally which could reflect upper tract infection but is nonspecific.  Stable small parapelvic cysts noted within the left kidney.  Left-sided double-J ureteral stent is noted in good position.    Bladder: Mild nonspecific wall thickening.  Prostate is not enlarged.    GI Tract/Mesentery: Small hiatal hernia.  Thickening seen along the greater curve of the stomach which could reflect gastritis.  Duodenal diverticulum suspected along the 2/3 portion duodenum.  Overall nonobstructive bowel gas pattern seen within the small bowel.  Fatty infiltration seen throughout the colon likely reflecting chronic inflammation.  Extensive diverticulosis seen throughout the descending and sigmoid colon.  Previously described clip within the cecum is no longer visualized.  No evidence of appendicitis.    Peritoneal Space: No ascites or free air.    Retroperitoneum: No significant adenopathy.    Abdominal wall: Normal.    Vasculature: No aneurysm.  Aorta demonstrates severe atherosclerotic disease.  Decreased attenuation of the intravascular space thought to reflect changes from anemia.  Bypass graft seen within the right-sided subcutaneous tissues extending to the right superficial femoral vein.  Fem-fem bypass graft also noted within the subcutaneous tissues of the lower pelvis.  Chronic aortic occlusion suspected within the distal aorta which a valuation is limited by noncontrast.    Bones: No definite acute fracture.  Ankylosis is seen from L3 through L5.  Wedge deformity noted at L1.  Diffuse osteopenia.  Moderate degenerative changes lower lumbar spine.                            4:53 AM: Per [facility] radiology, pt's CT Abdomen and Pelvis Without Intravenous Contrast results:  Radiologist: Ara Melendez MD  IMPRESSION:  1. Stable left ureteral stent, mild left hydronephrosis, left renal cortical scar, and perinephric stranding.  2. Bowel is nondilated. No free air, diverticulitis, or appendicitis.  3. Aortic atherosclerosis. No abdominal aortic aneurysm.         The EKG was ordered, reviewed, and independently interpreted by the ED provider.  Interpretation time: 1055  Rate: 84 BPM  Rhythm: NSR  Interpretation: no acute ST changes. No STEMI.      The Emergency Provider reviewed the vital signs and test results, which are outlined above.     ED Discussion     8:57 AM: Discharge discontinued. Dr. Samson assuming care at this time.     Reevaluated patient. Patient appears pale and has multiple episodes of bright red blood in stool in ED. Patient also orthostatic. I do not feel patient is safe for discharge. Plan to admit to  to monitor H&H, for volume resuscitation, and general surgery evaluation to recent diagnosis of colon cancer with active lower gi bleed.    8:59 AM: Discussed case with  (Kristan Faustin NP), who requests GI consult and covid19 results prior to dispo.    9:11 AM: Discussed pt's case with Dr. Fields (GI) who states there is no role for GI  if known colon cancer.    10:28 AM: Discussed pt's case with Dr. Yang (General Surgery) who recommends admit to  and cardiology consult. States patient is high risk for surgery due to cardiac history.    10:41 AM: Discussed case with Kristan Faustin NP (Hospital Medicine). Dr. Hayes agrees with current care and management of pt and accepts admission.   Admitting Service: Hospital Medicine  Admit to: obs / med tele    Re-evaluated pt. I have discussed test results, shared treatment plan, and the need for admission with patient and family at bedside. Pt and family express understanding at this time and agree with all information. All questions answered. Pt and family have no further questions or concerns at this time. Pt is ready for admit.           Medical Decision Making:   Clinical Tests:   Lab Tests: Ordered and Reviewed  Radiological Study: Ordered and Reviewed  Medical Tests: Ordered and Reviewed           ED Medication(s):  Medications   0.9%  NaCl infusion (for blood administration) (has no administration in time range)   0.9%  NaCl infusion (for blood administration) (has no administration in time range)   pantoprazole injection 40 mg (40 mg Intravenous Given 6/9/20 1205)   acetaminophen tablet 650 mg (has no administration in time range)   diphenhydrAMINE capsule 25 mg (has no administration in time range)   ondansetron injection 4 mg (4 mg Intravenous Given 6/9/20 1142)   albuterol-ipratropium 2.5 mg-0.5 mg/3 mL nebulizer solution 3 mL (has no administration in time range)   promethazine (PHENERGAN) 6.25 mg in dextrose 5 % 50 mL IVPB (has no administration in time range)   sodium chloride 0.9% bolus 1,000 mL (0 mLs Intravenous Stopped 6/9/20 1103)   ondansetron injection 8 mg (8 mg Intravenous Given 6/9/20 0915)       Current Discharge Medication List            Scribe Attestation:   Scribe #1: I performed the above scribed service and the documentation accurately describes the services I performed. I attest  to the accuracy of the note.     Attending:   Physician Attestation Statement for Scribe #1: I, Italia George MD, personally performed the services described in this documentation, as scribed by Leonardo Pathak, in my presence, and it is both accurate and complete.       Scribe Attestation:   Scribe #2: I performed the above scribed service and the documentation accurately describes the services I performed. I attest to the accuracy of the note.    Attending Attestation:           Physician Attestation for Scribe:    Physician Attestation Statement for Scribe #2: I, Sindi Samson MD, reviewed documentation, as scribed by Leonardo Ball in my presence, and it is both accurate and complete. I also acknowledge and confirm the content of the note done by Scribe #1.           Clinical Impression       ICD-10-CM ICD-9-CM   1. Acute lower GI bleeding K92.2 578.9   2. Hematochezia K92.1 578.1   3. Symptomatic anemia D64.9 285.9   4. Malignant neoplasm of colon, unspecified part of colon C18.9 153.9   5. Vasovagal episode R55 780.2   6. Orthostasis I95.1 458.0   7. Acute renal failure superimposed on stage 3 chronic kidney disease, unspecified acute renal failure type N17.9 584.9    N18.3 585.3   8. Pre-op evaluation Z01.818 V72.84       Disposition:   Disposition: Placed in Observation  Condition: Fair       Sindi Samson MD  06/09/20 1250

## 2020-06-09 NOTE — ASSESSMENT & PLAN NOTE
-hx of   -s/p fem-fem bypass graft and left SFA   -pt was followed outpatient by Dr. Jamil ([facility])  -ASA and Plavix on hold due to bleeding

## 2020-06-09 NOTE — ASSESSMENT & PLAN NOTE
-pt seen outpatient by Dr. Yang on yesterday   -recent colonoscopy per GI on last Thursday   -options for possible surgical intervention   -Cardiology consulted   -EKG, Echo, and chest xray pending   -antiemetics as needed   -analgesia as needed

## 2020-06-09 NOTE — ASSESSMENT & PLAN NOTE
69yo M with multiple medical comorbidities who presents with transverse vs descending colonic adenocarcinoma with possible metastatic lung disease with acute lower GI bleed, recurrent    - Possible that the bleed is from tumor, however patient also had colonoscopy last week with polypectomies so possible that polypectomy site is source of bleed as well  - may need GI medicine and endoscopy. Will discuss with them  - If from primary tumor in colon, will likely need colectomy in near future as this will continue to bleed as he is currently bleeding off anticoagulation and will prevent any other interventions to be done including cardiac interventions wall ongoing anemia with bleeding.  Long discussion with the patient his wife that if this is the necessary steps, he has a very high risk surgical candidate given his multiple medical comorbidities as well as ongoing cardiac issues, renal issues and other organ dysfunction.  Patient voiced understanding of this and is willing to accept whatever we recommend  - if needs an operation, patient will require open partial colectomy with likely end colostomy  - need to aggressively resuscitate, especially with blood products.  Ideally would achieve hemoglobin of 10 or higher given his cardiac history  - Keep NPO  - Will closely follow

## 2020-06-09 NOTE — ASSESSMENT & PLAN NOTE
-imaging on 6/5/2020 supports 3 noncalcified pulmonary nodules, measuring between 6 and 8 mm in the middle lobe and left upper lobe.  Due to history early metastasis must be considered.  -pt to benefit from outpatient follow up and further evaluation

## 2020-06-09 NOTE — H&P
[facility] Hospital Medicine  History & Physical    Patient Name: Kodi Ribeiro  MRN: [id number]  Admission Date: 6/8/2020  Attending Physician: Ruben Hayes MD   Primary Care Provider: Norma Nuñez MD         Patient information was obtained from patient, spouse/SO, past medical records and ER records.     Subjective:     Principal Problem: Acute GI Bleed     Chief Complaint:   Chief Complaint   Patient presents with    Rectal Bleeding     bloody stool this pm        HPI: Kodi Ribeiro is a 70 y.o. male patient with a PMHx of CVA, CHF, COPD, CAD, diverticulosis, GERD, PVD, hyperglycemia, HLD, HTN, Anemia, and MI who presents to the Emergency Department for evaluation of rectal bleeding which onset gradually earlier today. Pt reports having had a bloody stool this morning. Pt states that he was on Plavix, but was stopped by his PCP 10 days ago when he had similar sxs but has continued to take ASA. Pt had a hx of hematochezia since 01/2020 and had colonoscopy last Tuesday with colon cancer diagnosis.  Pt seen by Dr. Wilkerson (General Surgery) as outpatient yesterday with possible options for surgical intervention discussed.  Symptoms are intermittent and moderate in severity. No mitigating or exacerbating factors reported. Associated sxs include constipation, hematochezia, abdominal pain when making a BM, diaphoresis, chills, and syncope.  Patient denies any CP, hematuria, N/V, SOB, light-headedness, and all other sxs at this time. No prior Tx reported. No further complaints or concerns at this time.  Pt denies smoking and use of ETOH.  Pt is a full code and Laury STEWARD'Mechenor (wife) at [phone] is the surrogate decision maker.  ER work up showed: H/H 7.5/24.4, CO2 16, Ca 8.2, BUN/Creatinine 44/3.2, Glucose 159, and Albumin 3.3.  CT abdomen/pelvis performed and CT of chest/abdomen/pelvis results on 6/5/2020 reviewed.  ER discussed case with GI and General Surgery.  [facility]  contacted for admission with patient placed in Observation for further evaluation.      Past Medical History:   Diagnosis Date    Acute on chronic congestive heart failure 1/13/2020    Acute respiratory failure with hypoxia 1/14/2020    Analgesic nephropathy     Anemia     AP (angina pectoris) 1/11/2019    Arthritis     Colon polyp     Repeat colonoscopy due in 9/14    COPD exacerbation 2/6/2020    Coronary artery disease     Diverticulosis     colonoscopy 2/21/2014    Dysthymia 2/13/2020    Encounter for blood transfusion     GERD (gastroesophageal reflux disease)     Hemorrhoids     colonoscopy 2/21/2014    Horseshoe kidney     Hyperglycemia 3/17/2014    Hyperlipidemia     Hypertension     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    Late complications of amputation stump     rseolved with further amputation( MRSA then none since 2014)    Lipoma of colon     colonoscopy 2/21/2014    Myocardial infarction     per patient 2000 & 9/2012    Peripheral vascular disease     Phantom limb syndrome     patient reports only intermittent not problematic, not worsening    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    Spinal cord disease     L4L5 disc    Stroke     Tobacco dependence     resolved    Ureteral stent retained        Past Surgical History:   Procedure Laterality Date    ABDOMINAL AORTIC ANEURYSM REPAIR      ABDOMINAL AORTIC ANEURYSM REPAIR  1996/2014    AMPUTATION, LOWER LIMB      AORTA - BILATERAL FEMORAL ARTERY BYPASS GRAFT  2014    Left and right leg    COLONOSCOPY N/A 6/2/2020    Procedure: COLONOSCOPY;  Surgeon: Rosanna Harrington MD;  Location: [facility] ENDO;  Service: Endoscopy;  Laterality: N/A;    CORONARY ANGIOPLASTY WITH STENT PLACEMENT  2000    Three placed in heart    CYSTOSCOPY W/ RETROGRADES Left 5/29/2018    Procedure: CYSTOSCOPY, WITH RETROGRADE PYELOGRAM;  Surgeon: Scooter Jin IV, MD;  Location: [facility] OR;  Service: Urology;  Laterality: Left;    CYSTOSCOPY W/  URETERAL STENT PLACEMENT Left 5/29/2018    Procedure: CYSTOSCOPY, WITH URETERAL STENT INSERTION;  Surgeon: Scooter Jin IV, MD;  Location: [facility] OR;  Service: Urology;  Laterality: Left;    CYSTOSCOPY W/ URETERAL STENT PLACEMENT Left 2/4/2020    Procedure: CYSTOSCOPY, WITH URETERAL STENT INSERTION;  Surgeon: Scooter Jin IV, MD;  Location: [facility] OR;  Service: Urology;  Laterality: Left;    CYSTOSCOPY W/ URETERAL STENT REMOVAL Left 5/29/2018    Procedure: CYSTOSCOPY, WITH URETERAL STENT REMOVAL;  Surgeon: Scooter Jin IV, MD;  Location: [facility] OR;  Service: Urology;  Laterality: Left;    CYSTOSCOPY W/ URETERAL STENT REMOVAL Left 2/4/2020    Procedure: CYSTOSCOPY, WITH URETERAL STENT REMOVAL;  Surgeon: Scooter Jin IV, MD;  Location: [facility] OR;  Service: Urology;  Laterality: Left;    ESOPHAGOGASTRODUODENOSCOPY N/A 6/2/2020    Procedure: EGD (ESOPHAGOGASTRODUODENOSCOPY);  Surgeon: Rosanna Harrington MD;  Location: [facility] ENDO;  Service: Endoscopy;  Laterality: N/A;    FOOT AMPUTATION THROUGH METATARSAL  1996    left    FOOT SURGERY Bilateral 1980's    per patient multiple toe amputations prior to.  partial foot amputation:first great toe then other toes     KIDNEY SURGERY  2014    per patient separation of horseshoe kidney @ time of AAA repair    LEFT HEART CATHETERIZATION Left 3/7/2019    Procedure: CATHETERIZATION, HEART, LEFT;  Surgeon: Adriel Boone MD;  Location: [facility] CATH LAB;  Service: Cardiology;  Laterality: Left;  630 admit for IV hydration  10am start    LUNG LOBECTOMY Right 1970s    per patient not cancer    right below knee amputation  2009 (approx)    SMALL INTESTINE SURGERY  2014    per patient partial @ time of aaa repair  not small bowel - large bowel bowel compromised bythtwe AAAbowel    TONSILLECTOMY  1955 aprox    URETERAL STENT PLACEMENT Left     annually replaced since 2012 or so  Dr Jin       Review of patient's allergies indicates:   Allergen Reactions     Morphine Itching       No current facility-administered medications on file prior to encounter.      Current Outpatient Medications on File Prior to Encounter   Medication Sig    albuterol-ipratropium (DUO-NEB) 2.5 mg-0.5 mg/3 mL nebulizer solution Take 3 mLs by nebulization every 8 (eight) hours as needed for Wheezing or Shortness of Breath. Rescue    amLODIPine (NORVASC) 10 MG tablet TAKE 1 TABLET EVERY DAY    aspirin (ECOTRIN) 81 MG EC tablet Take 1 tablet (81 mg total) by mouth once daily.    atorvastatin (LIPITOR) 40 MG tablet Take 1 tablet (40 mg total) by mouth once daily.    carvedilol (COREG) 25 MG tablet Take 1 tablet (25 mg total) by mouth 2 (two) times daily with meals.    cetirizine (ZYRTEC) 10 MG tablet Take 10 mg by mouth once daily.    folic acid-vit B6-vit B12 2.5-25-2 mg (FOLBIC OR EQUIV) 2.5-25-2 mg Tab Take 1 tablet by mouth once daily.    furosemide (LASIX) 20 MG tablet Take two tab on Mon/Wed/Friday and one tab on Tues/Thurs/ Sat/Sunday    hydrALAZINE (APRESOLINE) 25 MG tablet Take 1 tablet (25 mg total) by mouth every 12 (twelve) hours.    isosorbide mononitrate (IMDUR) 120 MG 24 hr tablet Take 1 tablet by mouth once daily.    mupirocin (BACTROBAN) 2 % ointment Apply topically 3 (three) times daily.    nitroglycerin (NITROSTAT) 0.6 MG Subl Place 1 tablet (0.6 mg total) under the tongue every 5 (five) minutes as needed (max 3/ per episode).    omeprazole (PRILOSEC) 20 MG capsule TAKE 1 CAPSULE TWICE DAILY    OXYGEN-AIR DELIVERY SYSTEMS MISC by [facility].(Non-Drug; Combo Route) route.    sertraline (ZOLOFT) 50 MG tablet Take 1 tablet (50 mg total) by mouth once daily.    triamcinolone acetonide 0.1% (KENALOG) 0.1 % cream Apply topically 2 (two) times daily.     Family History     Problem Relation (Age of Onset)    COPD Mother    Cancer Mother    Diabetes Daughter    Heart disease Father        Tobacco Use    Smoking status: Former Smoker     Packs/day: 1.00     Years: 15.00     Pack  years: 15.00     Last attempt to quit: 2009     Years since quittin.4    Smokeless tobacco: Never Used   Substance and Sexual Activity    Alcohol use: No    Drug use: No     Comment: Is on prescription opiod, no non prescribed use    Sexual activity: Not Currently     Partners: Female     Review of Systems   Constitutional: Positive for chills and diaphoresis. Negative for activity change and fever.   HENT: Negative for congestion, postnasal drip, sinus pressure, sore throat and trouble swallowing.    Eyes: Negative.    Respiratory: Negative for cough and wheezing.    Cardiovascular: Negative for leg swelling.   Gastrointestinal: Positive for abdominal pain, anal bleeding and constipation.   Endocrine: Negative for cold intolerance and heat intolerance.   Genitourinary: Negative for difficulty urinating, dysuria, flank pain, frequency and urgency.   Musculoskeletal: Negative for arthralgias, back pain, gait problem and myalgias.   Skin: Negative for color change and wound.   Allergic/Immunologic: Negative for environmental allergies and food allergies.   Neurological: Positive for syncope. Negative for headaches.   Hematological: Bruises/bleeds easily.   Psychiatric/Behavioral: Negative for agitation, confusion and sleep disturbance. The patient is not nervous/anxious.      Objective:     Vital Signs (Most Recent):  Temp: 98.1 °F (36.7 °C) (20 0757)  Pulse: 77 (20 1015)  Resp: 12 (20 1015)  BP: 118/60 (20 1015)  SpO2: 97 % (20 1015) Vital Signs (24h Range):  Temp:  [97.7 °F (36.5 °C)-98.1 °F (36.7 °C)] 98.1 °F (36.7 °C)  Pulse:  [68-92] 77  Resp:  [10-26] 12  SpO2:  [97 %-100 %] 97 %  BP: ()/(56-82) 118/60     Weight: 79.4 kg (175 lb)  Body mass index is 23.73 kg/m².    Physical Exam   Constitutional: He is oriented to person, place, and time. He appears well-developed and well-nourished.   HENT:   Head: Normocephalic.   Nose: Nose normal.   Mouth/Throat: Mucous  membranes are dry.   Eyes: Conjunctivae are normal.   Neck: Normal range of motion. Neck supple.   Cardiovascular: Normal rate, regular rhythm, normal heart sounds and intact distal pulses.   Pulmonary/Chest: Effort normal and breath sounds normal. No respiratory distress. He has no wheezes. He has no rales.   Abdominal: Soft. Bowel sounds are normal. There is tenderness.   Genitourinary:   Genitourinary Comments: Deferred    Musculoskeletal: Normal range of motion. He exhibits no edema.   R foot amputated    Neurological: He is alert and oriented to person, place, and time.   Skin: Skin is warm and dry. There is pallor.   Psychiatric: He has a normal mood and affect. His behavior is normal.           Significant Labs:   CBC:   Recent Labs   Lab 06/09/20  0024 06/09/20 0918   WBC 12.96* 8.68   HGB 7.9* 7.5*   HCT 26.5* 24.4*    237     CMP:   Recent Labs   Lab 06/09/20  0024      K 4.5      CO2 16*   *   BUN 44*   CREATININE 3.2*   CALCIUM 8.2*   PROT 7.4   ALBUMIN 3.3*   BILITOT 0.3   ALKPHOS 164*   AST 17   ALT 14   ANIONGAP 11   EGFRNONAA 18*       Significant Imaging:   Imaging Results          CT Abdomen Pelvis  Without Contrast (Final result)  Result time 06/09/20 07:45:18   Procedure changed from CT Abdomen Pelvis With Contrast     Final result by Aurelio Curtis MD (06/09/20 07:45:18)                 Impression:      Overall findings are stable compared to prior exam.    All CT scans at this facility use dose modulation, iterative reconstruction, and/or weight based dosing when appropriate to reduce radiation dose to as low as reasonable achievable.      Electronically signed by: Aurelio Curtis MD  Date:    06/09/2020  Time:    07:45             Narrative:    EXAMINATION:  CT ABDOMEN PELVIS WITHOUT CONTRAST    CLINICAL HISTORY:  GI bleeding;Infection, abdomen-pelvis;recently diagnoised with colon CA, bloody stools today; drop in hb;    TECHNIQUE:  Low dose axial images, sagittal  and coronal reformations were obtained from the lung bases to the pubic symphysis.  Oral contrast was not administered.    COMPARISON:  06/05/2020    FINDINGS:  Heart: Normal size. No effusion.    Lung Bases: Emphysematous changes are noted.  Stable reticulonodular opacities within the right lower lobe    Liver: Normal size and attenuation. No focal lesions.    Gallbladder: No calcified gallstones.    Bile Ducts: No dilatation.    Pancreas: No obvious mass. No peripancreatic fat stranding.    Spleen: Normal.    Adrenals: Normal.    Kidneys/Ureters: Kidneys are atrophic.  Probable nonobstructing stone lower pole right kidney.  Horseshoe kidney configuration noted.  Moderate perinephric stranding is seen bilaterally which could reflect upper tract infection but is nonspecific.  Stable small parapelvic cysts noted within the left kidney.  Left-sided double-J ureteral stent is noted in good position.    Bladder: Mild nonspecific wall thickening.  Prostate is not enlarged.    GI Tract/Mesentery: Small hiatal hernia.  Thickening seen along the greater curve of the stomach which could reflect gastritis.  Duodenal diverticulum suspected along the 2/3 portion duodenum.  Overall nonobstructive bowel gas pattern seen within the small bowel.  Fatty infiltration seen throughout the colon likely reflecting chronic inflammation.  Extensive diverticulosis seen throughout the descending and sigmoid colon.  Previously described clip within the cecum is no longer visualized.  No evidence of appendicitis.    Peritoneal Space: No ascites or free air.    Retroperitoneum: No significant adenopathy.    Abdominal wall: Normal.    Vasculature: No aneurysm. Aorta demonstrates severe atherosclerotic disease.  Decreased attenuation of the intravascular space thought to reflect changes from anemia.  Bypass graft seen within the right-sided subcutaneous tissues extending to the right superficial femoral vein.  Fem-fem bypass graft also noted  within the subcutaneous tissues of the lower pelvis.  Chronic aortic occlusion suspected within the distal aorta which a valuation is limited by noncontrast.    Bones: No definite acute fracture.  Ankylosis is seen from L3 through L5.  Wedge deformity noted at L1.  Diffuse osteopenia.  Moderate degenerative changes lower lumbar spine.                              Assessment/Plan:     Leukocytosis  -likely reactive in the setting of active bleed   -resolved upon repeat CBC post transfusion   -monitor   -repeat CBC in am       Pulmonary nodules  -imaging on 6/5/2020 supports 3 noncalcified pulmonary nodules, measuring between 6 and 8 mm in the middle lobe and left upper lobe.  Due to history early metastasis must be considered.  -pt to benefit from outpatient follow up and further evaluation     Colon adenocarcinoma  -pt seen outpatient by Dr. Yang on yesterday   -recent colonoscopy per GI on last Thursday   -options for possible surgical intervention   -Cardiology consulted   -EKG, Echo, and chest xray pending   -antiemetics as needed   -analgesia as needed       COPD (chronic obstructive pulmonary disease)  -Duonebs as needed   -supplemental oxygen as needed       Acute lower GI bleeding  -in the setting of adenocarcinoma   -recent colonoscopy with clips within the cecum, with wall thickening present corresponding to the colon neoplasm  -Plavix held 10 days ago by PCP   -symptoms present intermittently since 01/2020  -ASA held   -PPI   -case discussed with GI with symptoms likely due to colon adenocarcinoma and no further recommendations   -General Surgery on consult   -serial H/H in progress   -downward trend noted with PRBCs transfused x 1 unit in ED and additional unit in progress      Coronary artery disease involving native coronary artery of native heart without angina pectoris  -ASA and Plavix held due to bleeding   -will resume Coreg when appropriate   -Statin and Imdur continued   -s/p stents x 3 in 2000  and MI       Acute on chronic kidney failure  BUN/Creatinine 44/3.2   -in the setting of symptomatic anemia   -baseline 1.4-2.5  -blood transfusion in progress   -repeat CMP in am     PVD (peripheral vascular disease)  -hx of   -s/p fem-fem bypass graft and left SFA   -pt was followed outpatient by Dr. Jamil ([facility] Surgery)  -ASA and Plavix on hold due to bleeding       Symptomatic anemia  -in the setting of lower GI Bleed   -ASA and Plavix on hold   -H/H 7.5/24.4   -PRBCs transfused x 1 unit with current unit in progress   -serial H/H's in progress-will continue to transfuse as needed   -supplemental oxygen as needed         Essential hypertension  -stable   -will hold antihypertensives due to active bleed and resume as appropriate       Hyperlipidemia  -Statin         VTE Risk Mitigation (From admission, onward)    None             Kristan Faustin NP  Department of Hospital Medicine   [facility]

## 2020-06-09 NOTE — HPI
Kodi Ribeiro is a 70 y.o. male patient with a PMHx of CVA, CHF, COPD, CAD, diverticulosis, GERD, PVD, hyperglycemia, HLD, HTN, Anemia, and MI who presents to the Emergency Department for evaluation of rectal bleeding which onset gradually earlier today. Pt reports having had a bloody stool this morning. Pt states that he was on Plavix, but was stopped by his PCP 10 days ago when he had similar sxs but has continued to take ASA. Pt had a hx of hematochezia since 01/2020 and had colonoscopy last Tuesday with colon cancer diagnosis. Pt seen by Dr. Wilkerson (General Surgery) as outpatient yesterday with possible options for surgical intervention discussed.  Symptoms are intermittent and moderate in severity. No mitigating or exacerbating factors reported. Associated sxs include constipation, hematochezia, abdominal pain when making a BM, diaphoresis, chills, and syncope.  Patient denies any CP, hematuria, N/V, SOB, light-headedness, and all other sxs at this time. No prior Tx reported. No further complaints or concerns at this time.  Pt denies smoking and use of ETOH.  Pt is a full code and Laury Michelnor (wife) at [phone] is the surrogate decision maker.  ER work up showed: H/H 7.5/24.4, CO2 16, Ca 8.2, BUN/Creatinine 44/3.2, Glucose 159, and Albumin 3.3.  CT abdomen/pelvis performed and CT of chest/abdomen/pelvis results on 6/5/2020 reviewed.  ER discussed case with GI and General Surgery.  Hospital Medicine contacted for admission with patient placed in Observation for further evaluation.

## 2020-06-09 NOTE — CONSULTS
[facility] -   Colorectal Surgery  Consult Note    Patient Name: Kodi Ribeiro  MRN: [id number]  Code Status: Prior  Admission Date: 6/8/2020  Hospital Length of Stay: 0 days  Attending Physician: Ruben Hayes MD  Primary Care Provider: Norma Nuñez MD    Patient information was obtained from patient, spouse/SO, past medical records and ER records.     Inpatient consult to General surgery  Consult performed by: Leonardo Yang MD  Consult ordered by: Sindi Samson MD        Subjective:     Principal Problem:  Lower GI bleed, colon adenocarcinoma    History of Present Illness: 70 y.o. male  well known to me who is admitted to the hospital with a LGIB. Pt has known colonic adenocarcinoma which is likely his source of bleeding, as he had colonoscopy last week showing other polyps as well as known carcinoma. Pt has a PMHx significant for anemia, GERD, HTN, HLD, CKD, CHF, CVA, CAD and PVD with previous AAA repair who recently underwent a colonoscopy on 06/02/2020 where a malignant-appearing mass was seen in the transverse colon, possible descending colon and biopsied.  Biopsies did confirm this to be adenocarcinoma.  Patient states that he had been having both hematochezia and melena since January intermittently. Patient's last colonoscopy prior to this 1 was in February 2014.  Patient has significant past surgical history including multiple previous abdominal operations including AAA repair, AAA graft excision and right ax fem bypass with small-bowel resection.  He denies current fever, chills, nausea, vomiting.  Patient reports a significant past coronary history requiring stent placement and likely need for further stent/CABG but is unable to undergo at this time due to high risk nature and has chosen medical therapy. Surgery consulted for evaluation of colonic adenoCA with bleeding.    No current facility-administered medications on file prior to encounter.      Current Outpatient  Medications on File Prior to Encounter   Medication Sig    albuterol-ipratropium (DUO-NEB) 2.5 mg-0.5 mg/3 mL nebulizer solution Take 3 mLs by nebulization every 8 (eight) hours as needed for Wheezing or Shortness of Breath. Rescue    amLODIPine (NORVASC) 10 MG tablet TAKE 1 TABLET EVERY DAY    aspirin (ECOTRIN) 81 MG EC tablet Take 1 tablet (81 mg total) by mouth once daily.    atorvastatin (LIPITOR) 40 MG tablet Take 1 tablet (40 mg total) by mouth once daily.    carvedilol (COREG) 25 MG tablet Take 1 tablet (25 mg total) by mouth 2 (two) times daily with meals.    cetirizine (ZYRTEC) 10 MG tablet Take 10 mg by mouth once daily.    folic acid-vit B6-vit B12 2.5-25-2 mg (FOLBIC OR EQUIV) 2.5-25-2 mg Tab Take 1 tablet by mouth once daily.    furosemide (LASIX) 20 MG tablet Take two tab on Mon/Wed/Friday and one tab on Tues/Thurs/ Sat/Sunday    hydrALAZINE (APRESOLINE) 25 MG tablet Take 1 tablet (25 mg total) by mouth every 12 (twelve) hours.    isosorbide mononitrate (IMDUR) 120 MG 24 hr tablet Take 1 tablet by mouth once daily.    mupirocin (BACTROBAN) 2 % ointment Apply topically 3 (three) times daily.    nitroglycerin (NITROSTAT) 0.6 MG Subl Place 1 tablet (0.6 mg total) under the tongue every 5 (five) minutes as needed (max 3/ per episode).    omeprazole (PRILOSEC) 20 MG capsule TAKE 1 CAPSULE TWICE DAILY    OXYGEN-AIR DELIVERY SYSTEMS MISC by [facility]Non-Drug; Combo Route) route.    sertraline (ZOLOFT) 50 MG tablet Take 1 tablet (50 mg total) by mouth once daily.    triamcinolone acetonide 0.1% (KENALOG) 0.1 % cream Apply topically 2 (two) times daily.       Review of patient's allergies indicates:   Allergen Reactions    Morphine Itching       Past Medical History:   Diagnosis Date    Acute on chronic congestive heart failure 1/13/2020    Acute respiratory failure with hypoxia 1/14/2020    Analgesic nephropathy     Anemia     AP (angina pectoris) 1/11/2019    Arthritis     Colon polyp      Repeat colonoscopy due in 9/14    COPD exacerbation 2/6/2020    Coronary artery disease     Diverticulosis     colonoscopy 2/21/2014    Dysthymia 2/13/2020    Encounter for blood transfusion     GERD (gastroesophageal reflux disease)     Hemorrhoids     colonoscopy 2/21/2014    Horseshoe kidney     Hyperglycemia 3/17/2014    Hyperlipidemia     Hypertension     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    Late complications of amputation stump     rseolved with further amputation( MRSA then none since 2014)    Lipoma of colon     colonoscopy 2/21/2014    Myocardial infarction     per patient 2000 & 9/2012    Peripheral vascular disease     Phantom limb syndrome     patient reports only intermittent not problematic, not worsening    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    Spinal cord disease     L4L5 disc    Stroke     Tobacco dependence     resolved    Ureteral stent retained      Past Surgical History:   Procedure Laterality Date    ABDOMINAL AORTIC ANEURYSM REPAIR      ABDOMINAL AORTIC ANEURYSM REPAIR  1996/2014    AMPUTATION, LOWER LIMB      AORTA - BILATERAL FEMORAL ARTERY BYPASS GRAFT  2014    Left and right leg    COLONOSCOPY N/A 6/2/2020    Procedure: COLONOSCOPY;  Surgeon: Rosanna Harrington MD;  Location: [facility] ENDO;  Service: Endoscopy;  Laterality: N/A;    CORONARY ANGIOPLASTY WITH STENT PLACEMENT  2000    Three placed in heart    CYSTOSCOPY W/ RETROGRADES Left 5/29/2018    Procedure: CYSTOSCOPY, WITH RETROGRADE PYELOGRAM;  Surgeon: Scooter Jin IV, MD;  Location: [facility] OR;  Service: Urology;  Laterality: Left;    CYSTOSCOPY W/ URETERAL STENT PLACEMENT Left 5/29/2018    Procedure: CYSTOSCOPY, WITH URETERAL STENT INSERTION;  Surgeon: Scooter Jin IV, MD;  Location: [facility] OR;  Service: Urology;  Laterality: Left;    CYSTOSCOPY W/ URETERAL STENT PLACEMENT Left 2/4/2020    Procedure: CYSTOSCOPY, WITH URETERAL STENT INSERTION;  Surgeon: Scooter Jin IV, MD;   Location: [facility] [place] OR;  Service: Urology;  Laterality: Left;    CYSTOSCOPY W/ URETERAL STENT REMOVAL Left 2018    Procedure: CYSTOSCOPY, WITH URETERAL STENT REMOVAL;  Surgeon: Scooter Jin IV, MD;  Location: [facility] [place] OR;  Service: Urology;  Laterality: Left;    CYSTOSCOPY W/ URETERAL STENT REMOVAL Left 2020    Procedure: CYSTOSCOPY, WITH URETERAL STENT REMOVAL;  Surgeon: Scooter Jin IV, MD;  Location: [facility] [place] OR;  Service: Urology;  Laterality: Left;    ESOPHAGOGASTRODUODENOSCOPY N/A 2020    Procedure: EGD (ESOPHAGOGASTRODUODENOSCOPY);  Surgeon: Rosanna Harrington MD;  Location: [facility] [place] ENDO;  Service: Endoscopy;  Laterality: N/A;    FOOT AMPUTATION THROUGH METATARSAL      left    FOOT SURGERY Bilateral     per patient multiple toe amputations prior to.  partial foot amputation:first great toe then other toes     KIDNEY SURGERY  2014    per patient separation of horseshoe kidney @ time of AAA repair    LEFT HEART CATHETERIZATION Left 3/7/2019    Procedure: CATHETERIZATION, HEART, LEFT;  Surgeon: Adriel Boone MD;  Location: [facility] [place] CATH LAB;  Service: Cardiology;  Laterality: Left;  630 admit for IV hydration  10am start    LUNG LOBECTOMY Right     per patient not cancer    right below knee amputation   (approx)    SMALL INTESTINE SURGERY      per patient partial @ time of aaa repair  not small bowel - large bowel bowel compromised bythtwe AAAbowel    TONSILLECTOMY   aprox    URETERAL STENT PLACEMENT Left     annually replaced since  or so  Dr Jin     Family History     Problem Relation (Age of Onset)    COPD Mother    Cancer Mother    Diabetes Daughter    Heart disease Father        Tobacco Use    Smoking status: Former Smoker     Packs/day: 1.00     Years: 15.00     Pack years: 15.00     Last attempt to quit: 2009     Years since quittin.4    Smokeless tobacco: Never Used   Substance and Sexual Activity    Alcohol use: No    Drug use: No      Comment: Is on prescription opiod, no non prescribed use    Sexual activity: Not Currently     Partners: Female     Review of Systems   Constitutional: Positive for chills and diaphoresis. Negative for activity change, appetite change, fatigue, fever and unexpected weight change.   HENT: Negative for congestion, ear pain, postnasal drip, sinus pressure, sore throat and trouble swallowing.    Eyes: Negative.  Negative for pain, redness and itching.   Respiratory: Negative for cough, shortness of breath and wheezing.    Cardiovascular: Negative for chest pain, palpitations and leg swelling.   Gastrointestinal: Positive for abdominal pain, blood in stool and constipation. Negative for abdominal distention, anal bleeding, diarrhea, nausea, rectal pain and vomiting.   Endocrine: Negative for cold intolerance, heat intolerance and polyuria.   Genitourinary: Negative for difficulty urinating, dysuria, flank pain, frequency, hematuria and urgency.   Musculoskeletal: Negative for arthralgias, back pain, gait problem, joint swelling, myalgias and neck pain.   Skin: Negative for color change, rash and wound.   Allergic/Immunologic: Negative for environmental allergies, food allergies and immunocompromised state.   Neurological: Positive for syncope. Negative for dizziness, speech difficulty, weakness, numbness and headaches.   Hematological: Bruises/bleeds easily.   Psychiatric/Behavioral: Negative for agitation, confusion, hallucinations and sleep disturbance. The patient is not nervous/anxious.      Objective:     Vital Signs (Most Recent):  Temp: 97.8 °F (36.6 °C) (06/09/20 1809)  Pulse: 88 (06/09/20 1809)  Resp: 18 (06/09/20 1809)  BP: (!) 157/67 (06/09/20 1809)  SpO2: 98 % (06/09/20 1809) Vital Signs (24h Range):  Temp:  [97.6 °F (36.4 °C)-98.1 °F (36.7 °C)] 97.8 °F (36.6 °C)  Pulse:  [68-92] 88  Resp:  [10-26] 18  SpO2:  [97 %-100 %] 98 %  BP: ()/(56-82) 157/67     Weight: 79.4 kg (175 lb)  Body mass index is  23.73 kg/m².    Physical Exam   Constitutional: He is oriented to person, place, and time. He appears well-developed.   HENT:   Head: Normocephalic and atraumatic.   Eyes: Conjunctivae and EOM are normal.   Neck: Normal range of motion. No thyromegaly present.   Cardiovascular: Normal rate and regular rhythm.   Pulmonary/Chest: Effort normal. No respiratory distress.   Abdominal:   Soft, nontender, nondistended, well-healed previous midline incision   Musculoskeletal: He exhibits no edema or tenderness.   R foot s/p amputation   Neurological: He is alert and oriented to person, place, and time.   Skin: Skin is warm and dry. Capillary refill takes less than 2 seconds. No rash noted.   Psychiatric: He has a normal mood and affect.       Significant Labs:  CBC:   Recent Labs   Lab 06/09/20  0918 06/09/20  1524   WBC 8.68  --    RBC 2.86*  --    HGB 7.5* 7.9*   HCT 24.4* 25.7*     --    MCV 85  --    MCH 26.2*  --    MCHC 30.7*  --      BMP:   Recent Labs   Lab 06/09/20  0024   *      K 4.5      CO2 16*   BUN 44*   CREATININE 3.2*   CALCIUM 8.2*     CMP:   Recent Labs   Lab 06/09/20  0024   *   CALCIUM 8.2*   ALBUMIN 3.3*   PROT 7.4      K 4.5   CO2 16*      BUN 44*   CREATININE 3.2*   ALKPHOS 164*   ALT 14   AST 17   BILITOT 0.3       Significant Diagnostics:  I have reviewed all pertinent imaging results/findings within the past 24 hours.   CT:  FINDINGS:  Heart: Normal size. No effusion.    Lung Bases: Emphysematous changes are noted.  Stable reticulonodular opacities within the right lower lobe    Liver: Normal size and attenuation. No focal lesions.    Gallbladder: No calcified gallstones.    Bile Ducts: No dilatation.    Pancreas: No obvious mass. No peripancreatic fat stranding.    Spleen: Normal.    Adrenals: Normal.    Kidneys/Ureters: Kidneys are atrophic.  Probable nonobstructing stone lower pole right kidney.  Horseshoe kidney configuration noted.  Moderate  perinephric stranding is seen bilaterally which could reflect upper tract infection but is nonspecific.  Stable small parapelvic cysts noted within the left kidney.  Left-sided double-J ureteral stent is noted in good position.    Bladder: Mild nonspecific wall thickening.  Prostate is not enlarged.    GI Tract/Mesentery: Small hiatal hernia.  Thickening seen along the greater curve of the stomach which could reflect gastritis.  Duodenal diverticulum suspected along the 2/3 portion duodenum.  Overall nonobstructive bowel gas pattern seen within the small bowel.  Fatty infiltration seen throughout the colon likely reflecting chronic inflammation.  Extensive diverticulosis seen throughout the descending and sigmoid colon.  Previously described clip within the cecum is no longer visualized.  No evidence of appendicitis.    Peritoneal Space: No ascites or free air.    Retroperitoneum: No significant adenopathy.    Abdominal wall: Normal.    Vasculature: No aneurysm. Aorta demonstrates severe atherosclerotic disease.  Decreased attenuation of the intravascular space thought to reflect changes from anemia.  Bypass graft seen within the right-sided subcutaneous tissues extending to the right superficial femoral vein.  Fem-fem bypass graft also noted within the subcutaneous tissues of the lower pelvis.  Chronic aortic occlusion suspected within the distal aorta which a valuation is limited by noncontrast.    Bones: No definite acute fracture.  Ankylosis is seen from L3 through L5.  Wedge deformity noted at L1.  Diffuse osteopenia.  Moderate degenerative changes lower lumbar spine.   Impression       Overall findings are stable compared to prior exam.         Assessment/Plan:     Colon adenocarcinoma  71yo M with multiple medical comorbidities who presents with transverse vs descending colonic adenocarcinoma with possible metastatic lung disease with acute lower GI bleed, recurrent    - Possible that the bleed is from tumor,  however patient also had colonoscopy last week with polypectomies so possible that polypectomy site is source of bleed as well  - may need GI medicine and endoscopy. Will discuss with them  - If from primary tumor in colon, will likely need colectomy in near future as this will continue to bleed as he is currently bleeding off anticoagulation and will prevent any other interventions to be done including cardiac interventions wall ongoing anemia with bleeding.  Long discussion with the patient his wife that if this is the necessary steps, he has a very high risk surgical candidate given his multiple medical comorbidities as well as ongoing cardiac issues, renal issues and other organ dysfunction.  Patient voiced understanding of this and is willing to accept whatever we recommend  - if needs an operation, patient will require open partial colectomy with likely end colostomy  - need to aggressively resuscitate, especially with blood products.  Ideally would achieve hemoglobin of 10 or higher given his cardiac history  - Keep NPO  - Will closely follow    COPD (chronic obstructive pulmonary disease)  Management per primary team    Acute lower GI bleeding  See plan for colon adenocarcinoma    Coronary artery disease involving native coronary artery of native heart without angina pectoris  Management per primary team    Acute on chronic kidney failure  Management per primary team    PVD (peripheral vascular disease)  Management per primary team    Symptomatic anemia  Management per primary team    Essential hypertension  Management per primary team    Hyperlipidemia  Management per primary team      VTE Risk Mitigation (From admission, onward)    None          Thank you for your consult. I will follow-up with patient. Please contact us if you have any additional questions.    Leonardo Yang MD  Colorectal Surgery  [facility] -

## 2020-06-09 NOTE — SUBJECTIVE & OBJECTIVE
No current facility-administered medications on file prior to encounter.      Current Outpatient Medications on File Prior to Encounter   Medication Sig    albuterol-ipratropium (DUO-NEB) 2.5 mg-0.5 mg/3 mL nebulizer solution Take 3 mLs by nebulization every 8 (eight) hours as needed for Wheezing or Shortness of Breath. Rescue    amLODIPine (NORVASC) 10 MG tablet TAKE 1 TABLET EVERY DAY    aspirin (ECOTRIN) 81 MG EC tablet Take 1 tablet (81 mg total) by mouth once daily.    atorvastatin (LIPITOR) 40 MG tablet Take 1 tablet (40 mg total) by mouth once daily.    carvedilol (COREG) 25 MG tablet Take 1 tablet (25 mg total) by mouth 2 (two) times daily with meals.    cetirizine (ZYRTEC) 10 MG tablet Take 10 mg by mouth once daily.    folic acid-vit B6-vit B12 2.5-25-2 mg (FOLBIC OR EQUIV) 2.5-25-2 mg Tab Take 1 tablet by mouth once daily.    furosemide (LASIX) 20 MG tablet Take two tab on Mon/Wed/Friday and one tab on Tues/Thurs/ Sat/Sunday    hydrALAZINE (APRESOLINE) 25 MG tablet Take 1 tablet (25 mg total) by mouth every 12 (twelve) hours.    isosorbide mononitrate (IMDUR) 120 MG 24 hr tablet Take 1 tablet by mouth once daily.    mupirocin (BACTROBAN) 2 % ointment Apply topically 3 (three) times daily.    nitroglycerin (NITROSTAT) 0.6 MG Subl Place 1 tablet (0.6 mg total) under the tongue every 5 (five) minutes as needed (max 3/ per episode).    omeprazole (PRILOSEC) 20 MG capsule TAKE 1 CAPSULE TWICE DAILY    OXYGEN-AIR DELIVERY SYSTEMS MISC by [facility].(Non-Drug; Combo Route) route.    sertraline (ZOLOFT) 50 MG tablet Take 1 tablet (50 mg total) by mouth once daily.    triamcinolone acetonide 0.1% (KENALOG) 0.1 % cream Apply topically 2 (two) times daily.       Review of patient's allergies indicates:   Allergen Reactions    Morphine Itching       Past Medical History:   Diagnosis Date    Acute on chronic congestive heart failure 1/13/2020    Acute respiratory failure with hypoxia 1/14/2020     Analgesic nephropathy     Anemia     AP (angina pectoris) 1/11/2019    Arthritis     Colon polyp     Repeat colonoscopy due in 9/14    COPD exacerbation 2/6/2020    Coronary artery disease     Diverticulosis     colonoscopy 2/21/2014    Dysthymia 2/13/2020    Encounter for blood transfusion     GERD (gastroesophageal reflux disease)     Hemorrhoids     colonoscopy 2/21/2014    Horseshoe kidney     Hyperglycemia 3/17/2014    Hyperlipidemia     Hypertension     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    Late complications of amputation stump     rseolved with further amputation( MRSA then none since 2014)    Lipoma of colon     colonoscopy 2/21/2014    Myocardial infarction     per patient 2000 & 9/2012    Peripheral vascular disease     Phantom limb syndrome     patient reports only intermittent not problematic, not worsening    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    Spinal cord disease     L4L5 disc    Stroke     Tobacco dependence     resolved    Ureteral stent retained      Past Surgical History:   Procedure Laterality Date    ABDOMINAL AORTIC ANEURYSM REPAIR      ABDOMINAL AORTIC ANEURYSM REPAIR  1996/2014    AMPUTATION, LOWER LIMB      AORTA - BILATERAL FEMORAL ARTERY BYPASS GRAFT  2014    Left and right leg    COLONOSCOPY N/A 6/2/2020    Procedure: COLONOSCOPY;  Surgeon: Rosanna Harrington MD;  Location: [facility];  Service: Endoscopy;  Laterality: N/A;    CORONARY ANGIOPLASTY WITH STENT PLACEMENT  2000    Three placed in heart    CYSTOSCOPY W/ RETROGRADES Left 5/29/2018    Procedure: CYSTOSCOPY, WITH RETROGRADE PYELOGRAM;  Surgeon: Scooter Jin IV, MD;  Location: [facility] OR;  Service: Urology;  Laterality: Left;    CYSTOSCOPY W/ URETERAL STENT PLACEMENT Left 5/29/2018    Procedure: CYSTOSCOPY, WITH URETERAL STENT INSERTION;  Surgeon: Scooter Jin IV, MD;  Location: [facility] OR;  Service: Urology;  Laterality: Left;    CYSTOSCOPY W/ URETERAL STENT PLACEMENT Left  2020    Procedure: CYSTOSCOPY, WITH URETERAL STENT INSERTION;  Surgeon: Scooter Jin IV, MD;  Location: [facility] OR;  Service: Urology;  Laterality: Left;    CYSTOSCOPY W/ URETERAL STENT REMOVAL Left 2018    Procedure: CYSTOSCOPY, WITH URETERAL STENT REMOVAL;  Surgeon: Scooter Jin IV, MD;  Location: [facility] OR;  Service: Urology;  Laterality: Left;    CYSTOSCOPY W/ URETERAL STENT REMOVAL Left 2020    Procedure: CYSTOSCOPY, WITH URETERAL STENT REMOVAL;  Surgeon: Scooter Jin IV, MD;  Location: [facility] OR;  Service: Urology;  Laterality: Left;    ESOPHAGOGASTRODUODENOSCOPY N/A 2020    Procedure: EGD (ESOPHAGOGASTRODUODENOSCOPY);  Surgeon: Rosanna Harrington MD;  Location: [facility] ENDO;  Service: Endoscopy;  Laterality: N/A;    FOOT AMPUTATION THROUGH METATARSAL      left    FOOT SURGERY Bilateral     per patient multiple toe amputations prior to.  partial foot amputation:first great toe then other toes     KIDNEY SURGERY  2014    per patient separation of horseshoe kidney @ time of AAA repair    LEFT HEART CATHETERIZATION Left 3/7/2019    Procedure: CATHETERIZATION, HEART, LEFT;  Surgeon: Adriel Boone MD;  Location: [facility] CATH LAB;  Service: Cardiology;  Laterality: Left;  630 admit for IV hydration  10am start    LUNG LOBECTOMY Right     per patient not cancer    right below knee amputation   (approx)    SMALL INTESTINE SURGERY      per patient partial @ time of aaa repair  not small bowel - large bowel bowel compromised bythtwe AAAbowel    TONSILLECTOMY   aprox    URETERAL STENT PLACEMENT Left     annually replaced since  or so  Dr Jin     Family History     Problem Relation (Age of Onset)    COPD Mother    Cancer Mother    Diabetes Daughter    Heart disease Father        Tobacco Use    Smoking status: Former Smoker     Packs/day: 1.00     Years: 15.00     Pack years: 15.00     Last attempt to quit: 2009     Years since quittin.4     Smokeless tobacco: Never Used   Substance and Sexual Activity    Alcohol use: No    Drug use: No     Comment: Is on prescription opiod, no non prescribed use    Sexual activity: Not Currently     Partners: Female     Review of Systems   Constitutional: Positive for chills and diaphoresis. Negative for activity change, appetite change, fatigue, fever and unexpected weight change.   HENT: Negative for congestion, ear pain, postnasal drip, sinus pressure, sore throat and trouble swallowing.    Eyes: Negative.  Negative for pain, redness and itching.   Respiratory: Negative for cough, shortness of breath and wheezing.    Cardiovascular: Negative for chest pain, palpitations and leg swelling.   Gastrointestinal: Positive for abdominal pain, blood in stool and constipation. Negative for abdominal distention, anal bleeding, diarrhea, nausea, rectal pain and vomiting.   Endocrine: Negative for cold intolerance, heat intolerance and polyuria.   Genitourinary: Negative for difficulty urinating, dysuria, flank pain, frequency, hematuria and urgency.   Musculoskeletal: Negative for arthralgias, back pain, gait problem, joint swelling, myalgias and neck pain.   Skin: Negative for color change, rash and wound.   Allergic/Immunologic: Negative for environmental allergies, food allergies and immunocompromised state.   Neurological: Positive for syncope. Negative for dizziness, speech difficulty, weakness, numbness and headaches.   Hematological: Bruises/bleeds easily.   Psychiatric/Behavioral: Negative for agitation, confusion, hallucinations and sleep disturbance. The patient is not nervous/anxious.      Objective:     Vital Signs (Most Recent):  Temp: 97.8 °F (36.6 °C) (06/09/20 1809)  Pulse: 88 (06/09/20 1809)  Resp: 18 (06/09/20 1809)  BP: (!) 157/67 (06/09/20 1809)  SpO2: 98 % (06/09/20 1809) Vital Signs (24h Range):  Temp:  [97.6 °F (36.4 °C)-98.1 °F (36.7 °C)] 97.8 °F (36.6 °C)  Pulse:  [68-92] 88  Resp:  [10-26]  18  SpO2:  [97 %-100 %] 98 %  BP: ()/(56-82) 157/67     Weight: 79.4 kg (175 lb)  Body mass index is 23.73 kg/m².    Physical Exam   Constitutional: He is oriented to person, place, and time. He appears well-developed.   HENT:   Head: Normocephalic and atraumatic.   Eyes: Conjunctivae and EOM are normal.   Neck: Normal range of motion. No thyromegaly present.   Cardiovascular: Normal rate and regular rhythm.   Pulmonary/Chest: Effort normal. No respiratory distress.   Abdominal:   Soft, nontender, nondistended, well-healed previous midline incision   Musculoskeletal: He exhibits no edema or tenderness.   R foot s/p amputation   Neurological: He is alert and oriented to person, place, and time.   Skin: Skin is warm and dry. Capillary refill takes less than 2 seconds. No rash noted.   Psychiatric: He has a normal mood and affect.       Significant Labs:  CBC:   Recent Labs   Lab 06/09/20  0918 06/09/20  1524   WBC 8.68  --    RBC 2.86*  --    HGB 7.5* 7.9*   HCT 24.4* 25.7*     --    MCV 85  --    MCH 26.2*  --    MCHC 30.7*  --      BMP:   Recent Labs   Lab 06/09/20  0024   *      K 4.5      CO2 16*   BUN 44*   CREATININE 3.2*   CALCIUM 8.2*     CMP:   Recent Labs   Lab 06/09/20  0024   *   CALCIUM 8.2*   ALBUMIN 3.3*   PROT 7.4      K 4.5   CO2 16*      BUN 44*   CREATININE 3.2*   ALKPHOS 164*   ALT 14   AST 17   BILITOT 0.3       Significant Diagnostics:  I have reviewed all pertinent imaging results/findings within the past 24 hours.   CT:  FINDINGS:  Heart: Normal size. No effusion.    Lung Bases: Emphysematous changes are noted.  Stable reticulonodular opacities within the right lower lobe    Liver: Normal size and attenuation. No focal lesions.    Gallbladder: No calcified gallstones.    Bile Ducts: No dilatation.    Pancreas: No obvious mass. No peripancreatic fat stranding.    Spleen: Normal.    Adrenals: Normal.    Kidneys/Ureters: Kidneys are atrophic.   Probable nonobstructing stone lower pole right kidney.  Horseshoe kidney configuration noted.  Moderate perinephric stranding is seen bilaterally which could reflect upper tract infection but is nonspecific.  Stable small parapelvic cysts noted within the left kidney.  Left-sided double-J ureteral stent is noted in good position.    Bladder: Mild nonspecific wall thickening.  Prostate is not enlarged.    GI Tract/Mesentery: Small hiatal hernia.  Thickening seen along the greater curve of the stomach which could reflect gastritis.  Duodenal diverticulum suspected along the 2/3 portion duodenum.  Overall nonobstructive bowel gas pattern seen within the small bowel.  Fatty infiltration seen throughout the colon likely reflecting chronic inflammation.  Extensive diverticulosis seen throughout the descending and sigmoid colon.  Previously described clip within the cecum is no longer visualized.  No evidence of appendicitis.    Peritoneal Space: No ascites or free air.    Retroperitoneum: No significant adenopathy.    Abdominal wall: Normal.    Vasculature: No aneurysm. Aorta demonstrates severe atherosclerotic disease.  Decreased attenuation of the intravascular space thought to reflect changes from anemia.  Bypass graft seen within the right-sided subcutaneous tissues extending to the right superficial femoral vein.  Fem-fem bypass graft also noted within the subcutaneous tissues of the lower pelvis.  Chronic aortic occlusion suspected within the distal aorta which a valuation is limited by noncontrast.    Bones: No definite acute fracture.  Ankylosis is seen from L3 through L5.  Wedge deformity noted at L1.  Diffuse osteopenia.  Moderate degenerative changes lower lumbar spine.   Impression       Overall findings are stable compared to prior exam.

## 2020-06-09 NOTE — ASSESSMENT & PLAN NOTE
BUN/Creatinine 44/3.2   -in the setting of symptomatic anemia   -baseline 1.4-2.5  -blood transfusion in progress   -repeat CMP in am

## 2020-06-09 NOTE — ASSESSMENT & PLAN NOTE
Management per primary team   Renewed once but should get this filled thru DR Holman after this (as DR Holman is currently out of office but pt due for f/u with him soon as it is) and he did no show for our appt last month so should get f/u soon ; is over due for labs as well (please see phone message from yesterday on this as well) -- please call pt to set up f/u appts and labs

## 2020-06-09 NOTE — ASSESSMENT & PLAN NOTE
-in the setting of adenocarcinoma   -recent colonoscopy with clips within the cecum, with wall thickening present corresponding to the colon neoplasm  -Plavix held 10 days ago by PCP   -symptoms present intermittently since 01/2020  -ASA held   -PPI   -case discussed with GI with symptoms likely due to colon adenocarcinoma and no further recommendations   -General Surgery on consult   -serial H/H in progress   -downward trend noted with PRBCs transfused x 1 unit in ED and additional unit in progress

## 2020-06-09 NOTE — ASSESSMENT & PLAN NOTE
-in the setting of lower GI Bleed   -ASA and Plavix on hold   -H/H 7.5/24.4   -PRBCs transfused x 1 unit with current unit in progress   -serial H/H's in progress-will continue to transfuse as needed   -supplemental oxygen as needed

## 2020-06-09 NOTE — SUBJECTIVE & OBJECTIVE
Past Medical History:   Diagnosis Date    Acute on chronic congestive heart failure 1/13/2020    Acute respiratory failure with hypoxia 1/14/2020    Analgesic nephropathy     Anemia     AP (angina pectoris) 1/11/2019    Arthritis     Colon polyp     Repeat colonoscopy due in 9/14    COPD exacerbation 2/6/2020    Coronary artery disease     Diverticulosis     colonoscopy 2/21/2014    Dysthymia 2/13/2020    Encounter for blood transfusion     GERD (gastroesophageal reflux disease)     Hemorrhoids     colonoscopy 2/21/2014    Horseshoe kidney     Hyperglycemia 3/17/2014    Hyperlipidemia     Hypertension     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    Late complications of amputation stump     rseolved with further amputation( MRSA then none since 2014)    Lipoma of colon     colonoscopy 2/21/2014    Myocardial infarction     per patient 2000 & 9/2012    Peripheral vascular disease     Phantom limb syndrome     patient reports only intermittent not problematic, not worsening    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    Spinal cord disease     L4L5 disc    Stroke     Tobacco dependence     resolved    Ureteral stent retained        Past Surgical History:   Procedure Laterality Date    ABDOMINAL AORTIC ANEURYSM REPAIR      ABDOMINAL AORTIC ANEURYSM REPAIR  1996/2014    AMPUTATION, LOWER LIMB      AORTA - BILATERAL FEMORAL ARTERY BYPASS GRAFT  2014    Left and right leg    COLONOSCOPY N/A 6/2/2020    Procedure: COLONOSCOPY;  Surgeon: Rosanna Harrington MD;  Location: [facility] ENDO;  Service: Endoscopy;  Laterality: N/A;    CORONARY ANGIOPLASTY WITH STENT PLACEMENT  2000    Three placed in heart    CYSTOSCOPY W/ RETROGRADES Left 5/29/2018    Procedure: CYSTOSCOPY, WITH RETROGRADE PYELOGRAM;  Surgeon: Scooter Jin IV, MD;  Location: [facility] OR;  Service: Urology;  Laterality: Left;    CYSTOSCOPY W/ URETERAL STENT PLACEMENT Left 5/29/2018    Procedure: CYSTOSCOPY, WITH URETERAL STENT  INSERTION;  Surgeon: Scooter Jin IV, MD;  Location: [facility] OR;  Service: Urology;  Laterality: Left;    CYSTOSCOPY W/ URETERAL STENT PLACEMENT Left 2/4/2020    Procedure: CYSTOSCOPY, WITH URETERAL STENT INSERTION;  Surgeon: Scooter Jin IV, MD;  Location: [facility] OR;  Service: Urology;  Laterality: Left;    CYSTOSCOPY W/ URETERAL STENT REMOVAL Left 5/29/2018    Procedure: CYSTOSCOPY, WITH URETERAL STENT REMOVAL;  Surgeon: Scooter Jin IV, MD;  Location: [facility] OR;  Service: Urology;  Laterality: Left;    CYSTOSCOPY W/ URETERAL STENT REMOVAL Left 2/4/2020    Procedure: CYSTOSCOPY, WITH URETERAL STENT REMOVAL;  Surgeon: Scooter Jin IV, MD;  Location: [facility] OR;  Service: Urology;  Laterality: Left;    ESOPHAGOGASTRODUODENOSCOPY N/A 6/2/2020    Procedure: EGD (ESOPHAGOGASTRODUODENOSCOPY);  Surgeon: Rosanna Harrington MD;  Location: [facility] ENDO;  Service: Endoscopy;  Laterality: N/A;    FOOT AMPUTATION THROUGH METATARSAL  1996    left    FOOT SURGERY Bilateral 1980's    per patient multiple toe amputations prior to.  partial foot amputation:first great toe then other toes     KIDNEY SURGERY  2014    per patient separation of horseshoe kidney @ time of AAA repair    LEFT HEART CATHETERIZATION Left 3/7/2019    Procedure: CATHETERIZATION, HEART, LEFT;  Surgeon: Adriel Boone MD;  Location: [facility] CATH LAB;  Service: Cardiology;  Laterality: Left;  630 admit for IV hydration  10am start    LUNG LOBECTOMY Right 1970s    per patient not cancer    right below knee amputation  2009 (approx)    SMALL INTESTINE SURGERY  2014    per patient partial @ time of aaa repair  not small bowel - large bowel bowel compromised bythtcintia AAAbowel    TONSILLECTOMY  1955 aprox    URETERAL STENT PLACEMENT Left     annually replaced since 2012 or so  Dr Jin       Review of patient's allergies indicates:   Allergen Reactions    Morphine Itching       No current facility-administered medications on file prior to  encounter.      Current Outpatient Medications on File Prior to Encounter   Medication Sig    albuterol-ipratropium (DUO-NEB) 2.5 mg-0.5 mg/3 mL nebulizer solution Take 3 mLs by nebulization every 8 (eight) hours as needed for Wheezing or Shortness of Breath. Rescue    amLODIPine (NORVASC) 10 MG tablet TAKE 1 TABLET EVERY DAY    aspirin (ECOTRIN) 81 MG EC tablet Take 1 tablet (81 mg total) by mouth once daily.    atorvastatin (LIPITOR) 40 MG tablet Take 1 tablet (40 mg total) by mouth once daily.    carvedilol (COREG) 25 MG tablet Take 1 tablet (25 mg total) by mouth 2 (two) times daily with meals.    cetirizine (ZYRTEC) 10 MG tablet Take 10 mg by mouth once daily.    folic acid-vit B6-vit B12 2.5-25-2 mg (FOLBIC OR EQUIV) 2.5-25-2 mg Tab Take 1 tablet by mouth once daily.    furosemide (LASIX) 20 MG tablet Take two tab on Mon/Wed/Friday and one tab on Tues/Thurs/ Sat/    hydrALAZINE (APRESOLINE) 25 MG tablet Take 1 tablet (25 mg total) by mouth every 12 (twelve) hours.    isosorbide mononitrate (IMDUR) 120 MG 24 hr tablet Take 1 tablet by mouth once daily.    mupirocin (BACTROBAN) 2 % ointment Apply topically 3 (three) times daily.    nitroglycerin (NITROSTAT) 0.6 MG Subl Place 1 tablet (0.6 mg total) under the tongue every 5 (five) minutes as needed (max 3/ per episode).    omeprazole (PRILOSEC) 20 MG capsule TAKE 1 CAPSULE TWICE DAILY    OXYGEN-AIR DELIVERY SYSTEMS MISC by [facility] [place].(Non-Drug; Combo Route) route.    sertraline (ZOLOFT) 50 MG tablet Take 1 tablet (50 mg total) by mouth once daily.    triamcinolone acetonide 0.1% (KENALOG) 0.1 % cream Apply topically 2 (two) times daily.     Family History     Problem Relation (Age of Onset)    COPD Mother    Cancer Mother    Diabetes Daughter    Heart disease Father        Tobacco Use    Smoking status: Former Smoker     Packs/day: 1.00     Years: 15.00     Pack years: 15.00     Last attempt to quit: 2009     Years since quittin.4     Smokeless tobacco: Never Used   Substance and Sexual Activity    Alcohol use: No    Drug use: No     Comment: Is on prescription opiod, no non prescribed use    Sexual activity: Not Currently     Partners: Female     Review of Systems   Constitutional: Positive for chills and diaphoresis. Negative for activity change and fever.   HENT: Negative for congestion, postnasal drip, sinus pressure, sore throat and trouble swallowing.    Eyes: Negative.    Respiratory: Negative for cough and wheezing.    Cardiovascular: Negative for leg swelling.   Gastrointestinal: Positive for abdominal pain, anal bleeding and constipation.   Endocrine: Negative for cold intolerance and heat intolerance.   Genitourinary: Negative for difficulty urinating, dysuria, flank pain, frequency and urgency.   Musculoskeletal: Negative for arthralgias, back pain, gait problem and myalgias.   Skin: Negative for color change and wound.   Allergic/Immunologic: Negative for environmental allergies and food allergies.   Neurological: Positive for syncope. Negative for headaches.   Hematological: Bruises/bleeds easily.   Psychiatric/Behavioral: Negative for agitation, confusion and sleep disturbance. The patient is not nervous/anxious.      Objective:     Vital Signs (Most Recent):  Temp: 98.1 °F (36.7 °C) (06/09/20 0757)  Pulse: 77 (06/09/20 1015)  Resp: 12 (06/09/20 1015)  BP: 118/60 (06/09/20 1015)  SpO2: 97 % (06/09/20 1015) Vital Signs (24h Range):  Temp:  [97.7 °F (36.5 °C)-98.1 °F (36.7 °C)] 98.1 °F (36.7 °C)  Pulse:  [68-92] 77  Resp:  [10-26] 12  SpO2:  [97 %-100 %] 97 %  BP: ()/(56-82) 118/60     Weight: 79.4 kg (175 lb)  Body mass index is 23.73 kg/m².    Physical Exam   Constitutional: He is oriented to person, place, and time. He appears well-developed and well-nourished.   HENT:   Head: Normocephalic.   Nose: Nose normal.   Mouth/Throat: Mucous membranes are dry.   Eyes: Conjunctivae are normal.   Neck: Normal range of motion. Neck  supple.   Cardiovascular: Normal rate, regular rhythm, normal heart sounds and intact distal pulses.   Pulmonary/Chest: Effort normal and breath sounds normal. No respiratory distress. He has no wheezes. He has no rales.   Abdominal: Soft. Bowel sounds are normal. There is tenderness.   Genitourinary:   Genitourinary Comments: Deferred    Musculoskeletal: Normal range of motion. He exhibits no edema.   R foot amputated    Neurological: He is alert and oriented to person, place, and time.   Skin: Skin is warm and dry. There is pallor.   Psychiatric: He has a normal mood and affect. His behavior is normal.           Significant Labs:   CBC:   Recent Labs   Lab 06/09/20  0024 06/09/20 0918   WBC 12.96* 8.68   HGB 7.9* 7.5*   HCT 26.5* 24.4*    237     CMP:   Recent Labs   Lab 06/09/20  0024      K 4.5      CO2 16*   *   BUN 44*   CREATININE 3.2*   CALCIUM 8.2*   PROT 7.4   ALBUMIN 3.3*   BILITOT 0.3   ALKPHOS 164*   AST 17   ALT 14   ANIONGAP 11   EGFRNONAA 18*       Significant Imaging:   Imaging Results          CT Abdomen Pelvis  Without Contrast (Final result)  Result time 06/09/20 07:45:18   Procedure changed from CT Abdomen Pelvis With Contrast     Final result by Aurelio Curtis MD (06/09/20 07:45:18)                 Impression:      Overall findings are stable compared to prior exam.    All CT scans at this facility use dose modulation, iterative reconstruction, and/or weight based dosing when appropriate to reduce radiation dose to as low as reasonable achievable.      Electronically signed by: Aurelio Curtis MD  Date:    06/09/2020  Time:    07:45             Narrative:    EXAMINATION:  CT ABDOMEN PELVIS WITHOUT CONTRAST    CLINICAL HISTORY:  GI bleeding;Infection, abdomen-pelvis;recently diagnoised with colon CA, bloody stools today; drop in hb;    TECHNIQUE:  Low dose axial images, sagittal and coronal reformations were obtained from the lung bases to the pubic symphysis.  Oral  contrast was not administered.    COMPARISON:  06/05/2020    FINDINGS:  Heart: Normal size. No effusion.    Lung Bases: Emphysematous changes are noted.  Stable reticulonodular opacities within the right lower lobe    Liver: Normal size and attenuation. No focal lesions.    Gallbladder: No calcified gallstones.    Bile Ducts: No dilatation.    Pancreas: No obvious mass. No peripancreatic fat stranding.    Spleen: Normal.    Adrenals: Normal.    Kidneys/Ureters: Kidneys are atrophic.  Probable nonobstructing stone lower pole right kidney.  Horseshoe kidney configuration noted.  Moderate perinephric stranding is seen bilaterally which could reflect upper tract infection but is nonspecific.  Stable small parapelvic cysts noted within the left kidney.  Left-sided double-J ureteral stent is noted in good position.    Bladder: Mild nonspecific wall thickening.  Prostate is not enlarged.    GI Tract/Mesentery: Small hiatal hernia.  Thickening seen along the greater curve of the stomach which could reflect gastritis.  Duodenal diverticulum suspected along the 2/3 portion duodenum.  Overall nonobstructive bowel gas pattern seen within the small bowel.  Fatty infiltration seen throughout the colon likely reflecting chronic inflammation.  Extensive diverticulosis seen throughout the descending and sigmoid colon.  Previously described clip within the cecum is no longer visualized.  No evidence of appendicitis.    Peritoneal Space: No ascites or free air.    Retroperitoneum: No significant adenopathy.    Abdominal wall: Normal.    Vasculature: No aneurysm. Aorta demonstrates severe atherosclerotic disease.  Decreased attenuation of the intravascular space thought to reflect changes from anemia.  Bypass graft seen within the right-sided subcutaneous tissues extending to the right superficial femoral vein.  Fem-fem bypass graft also noted within the subcutaneous tissues of the lower pelvis.  Chronic aortic occlusion suspected  within the distal aorta which a valuation is limited by noncontrast.    Bones: No definite acute fracture.  Ankylosis is seen from L3 through L5.  Wedge deformity noted at L1.  Diffuse osteopenia.  Moderate degenerative changes lower lumbar spine.

## 2020-06-09 NOTE — DISCHARGE INSTRUCTIONS
Please follow-up with her primary care physician.  At this time I recommend that you follow-up with Gi/surgery/heme-onc.  As this may be related to your cancer.  If you have any chest pain, shortness of breath, palpitations, loss of consciousness or do not feel comfortable, please not hesitate to come back to the emergency department.

## 2020-06-09 NOTE — ED NOTES
"At bedside to d/c pt.  Pt's wife asking if any additional lab work is indicated.  Pt states, "I still don't feel well, I think I need to have another BM."  Pt assisted to bedside commode.  Dr. Samson notified.   "

## 2020-06-09 NOTE — HPI
70 y.o. male well known to me who is admitted to the hospital with a LGIB. Pt has known colonic adenocarcinoma which is likely his source of bleeding, as he had colonoscopy last week showing other polyps as well as known carcinoma. Pt has a PMHx significant for anemia, GERD, HTN, HLD, CKD, CHF, CVA, CAD and PVD with previous AAA repair who recently underwent a colonoscopy on 06/02/2020 where a malignant-appearing mass was seen in the transverse colon, possible descending colon and biopsied.  Biopsies did confirm this to be adenocarcinoma.  Patient states that he had been having both hematochezia and melena since January intermittently. Patient's last colonoscopy prior to this 1 was in February 2014.  Patient has significant past surgical history including multiple previous abdominal operations including AAA repair, AAA graft excision and right ax fem bypass with small-bowel resection.  He denies current fever, chills, nausea, vomiting.  Patient reports a significant past coronary history requiring stent placement and likely need for further stent/CABG but is unable to undergo at this time due to high risk nature and has chosen medical therapy. Surgery consulted for evaluation of colonic adenoCA with bleeding.

## 2020-06-10 ENCOUNTER — ANESTHESIA EVENT (OUTPATIENT)
Dept: SURGERY | Facility: HOSPITAL | Age: 70
DRG: 329 | End: 2020-06-10
Payer: MEDICARE

## 2020-06-10 LAB
ALBUMIN SERPL BCP-MCNC: 3 G/DL (ref 3.5–5.2)
ALP SERPL-CCNC: 115 U/L (ref 55–135)
ALT SERPL W/O P-5'-P-CCNC: 9 U/L (ref 10–44)
ANION GAP SERPL CALC-SCNC: 9 MMOL/L (ref 8–16)
AST SERPL-CCNC: 9 U/L (ref 10–40)
BILIRUB SERPL-MCNC: 0.4 MG/DL (ref 0.1–1)
BLD PROD TYP BPU: NORMAL
BLD PROD TYP BPU: NORMAL
BLOOD UNIT EXPIRATION DATE: NORMAL
BLOOD UNIT EXPIRATION DATE: NORMAL
BLOOD UNIT TYPE CODE: 5100
BLOOD UNIT TYPE CODE: 5100
BLOOD UNIT TYPE: NORMAL
BLOOD UNIT TYPE: NORMAL
BUN SERPL-MCNC: 44 MG/DL (ref 8–23)
CALCIUM SERPL-MCNC: 7.9 MG/DL (ref 8.7–10.5)
CHLORIDE SERPL-SCNC: 111 MMOL/L (ref 95–110)
CO2 SERPL-SCNC: 19 MMOL/L (ref 23–29)
CODING SYSTEM: NORMAL
CODING SYSTEM: NORMAL
CREAT SERPL-MCNC: 3 MG/DL (ref 0.5–1.4)
DISPENSE STATUS: NORMAL
DISPENSE STATUS: NORMAL
EST. GFR  (AFRICAN AMERICAN): 23 ML/MIN/1.73 M^2
EST. GFR  (NON AFRICAN AMERICAN): 20 ML/MIN/1.73 M^2
GLUCOSE SERPL-MCNC: 100 MG/DL (ref 70–110)
HCT VFR BLD AUTO: 25.4 % (ref 40–54)
HGB BLD-MCNC: 8 G/DL (ref 14–18)
NUM UNITS TRANS PACKED RBC: NORMAL
NUM UNITS TRANS PACKED RBC: NORMAL
POTASSIUM SERPL-SCNC: 4.4 MMOL/L (ref 3.5–5.1)
PROT SERPL-MCNC: 6.1 G/DL (ref 6–8.4)
SODIUM SERPL-SCNC: 139 MMOL/L (ref 136–145)

## 2020-06-10 PROCEDURE — 99223 PR INITIAL HOSPITAL CARE,LEVL III: ICD-10-PCS | Mod: HCNC,,, | Performed by: INTERNAL MEDICINE

## 2020-06-10 PROCEDURE — 99232 PR SUBSEQUENT HOSPITAL CARE,LEVL II: ICD-10-PCS | Mod: HCNC,,, | Performed by: COLON & RECTAL SURGERY

## 2020-06-10 PROCEDURE — 25000003 PHARM REV CODE 250: Mod: HCNC | Performed by: INTERNAL MEDICINE

## 2020-06-10 PROCEDURE — 25000003 PHARM REV CODE 250: Mod: HCNC | Performed by: COLON & RECTAL SURGERY

## 2020-06-10 PROCEDURE — 21400001 HC TELEMETRY ROOM: Mod: HCNC

## 2020-06-10 PROCEDURE — 94761 N-INVAS EAR/PLS OXIMETRY MLT: CPT | Mod: HCNC

## 2020-06-10 PROCEDURE — 99223 1ST HOSP IP/OBS HIGH 75: CPT | Mod: HCNC,,, | Performed by: INTERNAL MEDICINE

## 2020-06-10 PROCEDURE — P9016 RBC LEUKOCYTES REDUCED: HCPCS | Mod: HCNC

## 2020-06-10 PROCEDURE — 99232 SBSQ HOSP IP/OBS MODERATE 35: CPT | Mod: HCNC,,, | Performed by: COLON & RECTAL SURGERY

## 2020-06-10 PROCEDURE — 36430 TRANSFUSION BLD/BLD COMPNT: CPT | Mod: HCNC

## 2020-06-10 PROCEDURE — 85018 HEMOGLOBIN: CPT | Mod: HCNC

## 2020-06-10 PROCEDURE — 36415 COLL VENOUS BLD VENIPUNCTURE: CPT | Mod: HCNC

## 2020-06-10 PROCEDURE — C9113 INJ PANTOPRAZOLE SODIUM, VIA: HCPCS | Mod: HCNC | Performed by: NURSE PRACTITIONER

## 2020-06-10 PROCEDURE — 25000003 PHARM REV CODE 250: Mod: HCNC | Performed by: NURSE PRACTITIONER

## 2020-06-10 PROCEDURE — 80053 COMPREHEN METABOLIC PANEL: CPT | Mod: HCNC

## 2020-06-10 PROCEDURE — 85014 HEMATOCRIT: CPT | Mod: HCNC

## 2020-06-10 PROCEDURE — 63600175 PHARM REV CODE 636 W HCPCS: Mod: HCNC | Performed by: NURSE PRACTITIONER

## 2020-06-10 RX ORDER — SYRING-NEEDL,DISP,INSUL,0.3 ML 29 G X1/2"
296 SYRINGE, EMPTY DISPOSABLE MISCELLANEOUS ONCE
Status: COMPLETED | OUTPATIENT
Start: 2020-06-10 | End: 2020-06-10

## 2020-06-10 RX ORDER — ATORVASTATIN CALCIUM 40 MG/1
40 TABLET, FILM COATED ORAL DAILY
Status: DISCONTINUED | OUTPATIENT
Start: 2020-06-10 | End: 2020-06-17 | Stop reason: HOSPADM

## 2020-06-10 RX ORDER — CARVEDILOL 12.5 MG/1
12.5 TABLET ORAL 2 TIMES DAILY
Status: DISCONTINUED | OUTPATIENT
Start: 2020-06-10 | End: 2020-06-11

## 2020-06-10 RX ORDER — HYDROCODONE BITARTRATE AND ACETAMINOPHEN 500; 5 MG/1; MG/1
TABLET ORAL
Status: DISCONTINUED | OUTPATIENT
Start: 2020-06-10 | End: 2020-06-16 | Stop reason: SDUPTHER

## 2020-06-10 RX ADMIN — HYDROCODONE BITARTRATE AND ACETAMINOPHEN 1 TABLET: 5; 325 TABLET ORAL at 04:06

## 2020-06-10 RX ADMIN — ATORVASTATIN CALCIUM 40 MG: 40 TABLET, FILM COATED ORAL at 01:06

## 2020-06-10 RX ADMIN — CARVEDILOL 12.5 MG: 12.5 TABLET, FILM COATED ORAL at 08:06

## 2020-06-10 RX ADMIN — MAGNESIUM CITRATE 296 ML: 1.75 LIQUID ORAL at 06:06

## 2020-06-10 RX ADMIN — CARVEDILOL 12.5 MG: 12.5 TABLET, FILM COATED ORAL at 01:06

## 2020-06-10 RX ADMIN — PANTOPRAZOLE SODIUM 40 MG: 40 INJECTION, POWDER, LYOPHILIZED, FOR SOLUTION INTRAVENOUS at 09:06

## 2020-06-10 NOTE — SUBJECTIVE & OBJECTIVE
Past Medical History:   Diagnosis Date    Acute on chronic congestive heart failure 1/13/2020    Acute respiratory failure with hypoxia 1/14/2020    Analgesic nephropathy     Anemia     AP (angina pectoris) 1/11/2019    Arthritis     Colon polyp     Repeat colonoscopy due in 9/14    COPD exacerbation 2/6/2020    Coronary artery disease     Diverticulosis     colonoscopy 2/21/2014    Dysthymia 2/13/2020    Encounter for blood transfusion     GERD (gastroesophageal reflux disease)     Hemorrhoids     colonoscopy 2/21/2014    Horseshoe kidney     Hyperglycemia 3/17/2014    Hyperlipidemia     Hypertension     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    Late complications of amputation stump     rseolved with further amputation( MRSA then none since 2014)    Lipoma of colon     colonoscopy 2/21/2014    Myocardial infarction     per patient 2000 & 9/2012    Peripheral vascular disease     Phantom limb syndrome     patient reports only intermittent not problematic, not worsening    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    Spinal cord disease     L4L5 disc    Stroke     Tobacco dependence     resolved    Ureteral stent retained        Past Surgical History:   Procedure Laterality Date    ABDOMINAL AORTIC ANEURYSM REPAIR      ABDOMINAL AORTIC ANEURYSM REPAIR  1996/2014    AMPUTATION, LOWER LIMB      AORTA - BILATERAL FEMORAL ARTERY BYPASS GRAFT  2014    Left and right leg    COLONOSCOPY N/A 6/2/2020    Procedure: COLONOSCOPY;  Surgeon: Rosanna Harrington MD;  Location: [facility] ENDO;  Service: Endoscopy;  Laterality: N/A;    CORONARY ANGIOPLASTY WITH STENT PLACEMENT  2000    Three placed in heart    CYSTOSCOPY W/ RETROGRADES Left 5/29/2018    Procedure: CYSTOSCOPY, WITH RETROGRADE PYELOGRAM;  Surgeon: Scooter Jin IV, MD;  Location: [facility] OR;  Service: Urology;  Laterality: Left;    CYSTOSCOPY W/ URETERAL STENT PLACEMENT Left 5/29/2018    Procedure: CYSTOSCOPY, WITH URETERAL STENT  INSERTION;  Surgeon: Scooter Jin IV, MD;  Location: [facility] OR;  Service: Urology;  Laterality: Left;    CYSTOSCOPY W/ URETERAL STENT PLACEMENT Left 2/4/2020    Procedure: CYSTOSCOPY, WITH URETERAL STENT INSERTION;  Surgeon: Scooter Jin IV, MD;  Location: [facility] OR;  Service: Urology;  Laterality: Left;    CYSTOSCOPY W/ URETERAL STENT REMOVAL Left 5/29/2018    Procedure: CYSTOSCOPY, WITH URETERAL STENT REMOVAL;  Surgeon: Scooter Jin IV, MD;  Location: [facility] OR;  Service: Urology;  Laterality: Left;    CYSTOSCOPY W/ URETERAL STENT REMOVAL Left 2/4/2020    Procedure: CYSTOSCOPY, WITH URETERAL STENT REMOVAL;  Surgeon: Scooter Jin IV, MD;  Location: [facility] OR;  Service: Urology;  Laterality: Left;    ESOPHAGOGASTRODUODENOSCOPY N/A 6/2/2020    Procedure: EGD (ESOPHAGOGASTRODUODENOSCOPY);  Surgeon: Rosanna Harrington MD;  Location: [facility] ENDO;  Service: Endoscopy;  Laterality: N/A;    FOOT AMPUTATION THROUGH METATARSAL  1996    left    FOOT SURGERY Bilateral 1980's    per patient multiple toe amputations prior to.  partial foot amputation:first great toe then other toes     KIDNEY SURGERY  2014    per patient separation of horseshoe kidney @ time of AAA repair    LEFT HEART CATHETERIZATION Left 3/7/2019    Procedure: CATHETERIZATION, HEART, LEFT;  Surgeon: Adriel Boone MD;  Location: [facility] CATH LAB;  Service: Cardiology;  Laterality: Left;  630 admit for IV hydration  10am start    LUNG LOBECTOMY Right 1970s    per patient not cancer    right below knee amputation  2009 (approx)    SMALL INTESTINE SURGERY  2014    per patient partial @ time of aaa repair  not small bowel - large bowel bowel compromised bythtwe AAAbowel    TONSILLECTOMY  1955 aprox    URETERAL STENT PLACEMENT Left     annually replaced since 2012 or so  Dr Jin       Review of patient's allergies indicates:   Allergen Reactions    Morphine Itching     Family History     Problem Relation (Age of Onset)    COPD Mother     Cancer Mother    Diabetes Daughter    Heart disease Father        Tobacco Use    Smoking status: Former Smoker     Packs/day: 1.00     Years: 15.00     Pack years: 15.00     Last attempt to quit: 2009     Years since quittin.4    Smokeless tobacco: Never Used   Substance and Sexual Activity    Alcohol use: No    Drug use: No     Comment: Is on prescription opiod, no non prescribed use    Sexual activity: Not Currently     Partners: Female     Review of Systems   Constitutional: Negative for fever.   HENT: Negative for hearing loss.    Eyes: Negative for visual disturbance.   Respiratory: Negative for cough and shortness of breath.    Cardiovascular: Negative for chest pain and palpitations.   Gastrointestinal:        As per HPI.   Genitourinary: Negative for difficulty urinating, dysuria, frequency and hematuria.   Musculoskeletal: Negative for arthralgias and back pain.   Skin: Negative for color change and rash.   Neurological: Positive for weakness and light-headedness. Negative for seizures, syncope, numbness and headaches.   Hematological: Bruises/bleeds easily.   Psychiatric/Behavioral: The patient is not nervous/anxious.      Objective:     Vital Signs (Most Recent):  Temp: 97.6 °F (36.4 °C) (06/10/20 0732)  Pulse: 76 (06/10/20 0732)  Resp: 14 (06/10/20 0732)  BP: 135/68 (06/10/20 0732)  SpO2: 98 % (06/10/20 0732) Vital Signs (24h Range):  Temp:  [97.4 °F (36.3 °C)-98.4 °F (36.9 °C)] 97.6 °F (36.4 °C)  Pulse:  [75-92] 76  Resp:  [10-22] 14  SpO2:  [97 %-100 %] 98 %  BP: ()/(56-73) 135/68     Weight: 79.4 kg (175 lb) (20 1125)  Body mass index is 23.73 kg/m².      Intake/Output Summary (Last 24 hours) at 6/10/2020 0922  Last data filed at 6/10/2020 0611  Gross per 24 hour   Intake 1852.5 ml   Output 500 ml   Net 1352.5 ml       Lines/Drains/Airways     Peripheral Intravenous Line                 Peripheral IV - Single Lumen 20 0910 18 G Right Forearm 1 day                 Physical Exam   Constitutional: He is oriented to person, place, and time. He appears well-developed and well-nourished.   HENT:   Head: Normocephalic and atraumatic.   Eyes: EOM are normal.   Neck: Normal range of motion. Neck supple.   Cardiovascular: Normal rate, regular rhythm and normal heart sounds.   No murmur heard.  Pulmonary/Chest: Effort normal and breath sounds normal. No respiratory distress. He has no wheezes.   Abdominal: Soft. Bowel sounds are normal. He exhibits no distension and no mass. There is no hepatomegaly. There is no tenderness.   Musculoskeletal: He exhibits no edema.   Right BKA.   Neurological: He is alert and oriented to person, place, and time. No cranial nerve deficit. Gait normal.   Skin: Skin is warm and dry. No rash noted.   Psychiatric: He has a normal mood and affect.       Significant Labs:  CBC:   Recent Labs   Lab 06/09/20  0024 06/09/20  0918 06/09/20  1524 06/09/20  2154 06/10/20  0043   WBC 12.96* 8.68  --   --   --    HGB 7.9* 7.5* 7.9* 8.2* 8.0*   HCT 26.5* 24.4* 25.7* 26.0* 25.4*    237  --   --   --      CMP:   Recent Labs   Lab 06/10/20  0523      CALCIUM 7.9*   ALBUMIN 3.0*   PROT 6.1      K 4.4   CO2 19*   *   BUN 44*   CREATININE 3.0*   ALKPHOS 115   ALT 9*   AST 9*   BILITOT 0.4     Coagulation:   Recent Labs   Lab 06/09/20  0024   INR 1.0   APTT 24.5       Significant Imaging:  Imaging results within the past 24 hours have been reviewed.

## 2020-06-10 NOTE — CONSULTS
[facility] -   Gastroenterology  Consult Note    Patient Name: Kodi Ribeiro  MRN: [id number]  Admission Date: 6/8/2020  Hospital Length of Stay: 1 days  Code Status: Prior   Attending Provider: Ruben Hayes MD   Consulting Provider: Lv Mckeon PA-C  Primary Care Physician: Norma Nuñez MD  Principal Problem:Acute lower GI bleeding    Consults  Subjective:     HPI:  The patient presented to the ER for hematochezia. He was recently diagnosed with colon cancer by colonoscopy on 06/02/20. Prior to the colonoscopy he had an episode of hematochezia and anemia. Plavix was stopped and the bleeding stopped. However, the hematochezia he experienced yesterday was different because it was at least 10 episodes of bloody diarrhea associated with abdominal cramping, nausea and weakness. Hgb had gone down to 7.5. Today he feels better. He hasn't had any bowel movements or bleeding today. No abdominal pain or nausea. Morning Hgb 8.0. He has been seen by Dr. Yang surgery being considered. Cardiology has seen to provide a risk assessment. Of note, the patient had polypectomy during the colonoscopy. He was also noted to have a non-bleeding AVM, diverticulosis and hemorrhoids.     Past Medical History:   Diagnosis Date    Acute on chronic congestive heart failure 1/13/2020    Acute respiratory failure with hypoxia 1/14/2020    Analgesic nephropathy     Anemia     AP (angina pectoris) 1/11/2019    Arthritis     Colon polyp     Repeat colonoscopy due in 9/14    COPD exacerbation 2/6/2020    Coronary artery disease     Diverticulosis     colonoscopy 2/21/2014    Dysthymia 2/13/2020    Encounter for blood transfusion     GERD (gastroesophageal reflux disease)     Hemorrhoids     colonoscopy 2/21/2014    Horseshoe kidney     Hyperglycemia 3/17/2014    Hyperlipidemia     Hypertension     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    Late complications of amputation stump     rseolved with further  amputation( MRSA then none since 2014)    Lipoma of colon     colonoscopy 2/21/2014    Myocardial infarction     per patient 2000 & 9/2012    Peripheral vascular disease     Phantom limb syndrome     patient reports only intermittent not problematic, not worsening    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    Spinal cord disease     L4L5 disc    Stroke     Tobacco dependence     resolved    Ureteral stent retained        Past Surgical History:   Procedure Laterality Date    ABDOMINAL AORTIC ANEURYSM REPAIR      ABDOMINAL AORTIC ANEURYSM REPAIR  1996/2014    AMPUTATION, LOWER LIMB      AORTA - BILATERAL FEMORAL ARTERY BYPASS GRAFT  2014    Left and right leg    COLONOSCOPY N/A 6/2/2020    Procedure: COLONOSCOPY;  Surgeon: Rosanna Harrington MD;  Location: [facility] ENDO;  Service: Endoscopy;  Laterality: N/A;    CORONARY ANGIOPLASTY WITH STENT PLACEMENT  2000    Three placed in heart    CYSTOSCOPY W/ RETROGRADES Left 5/29/2018    Procedure: CYSTOSCOPY, WITH RETROGRADE PYELOGRAM;  Surgeon: Scooter Jin IV, MD;  Location: [facility] OR;  Service: Urology;  Laterality: Left;    CYSTOSCOPY W/ URETERAL STENT PLACEMENT Left 5/29/2018    Procedure: CYSTOSCOPY, WITH URETERAL STENT INSERTION;  Surgeon: Scooter Jin IV, MD;  Location: [facility] OR;  Service: Urology;  Laterality: Left;    CYSTOSCOPY W/ URETERAL STENT PLACEMENT Left 2/4/2020    Procedure: CYSTOSCOPY, WITH URETERAL STENT INSERTION;  Surgeon: Scooter Jin IV, MD;  Location: [facility] OR;  Service: Urology;  Laterality: Left;    CYSTOSCOPY W/ URETERAL STENT REMOVAL Left 5/29/2018    Procedure: CYSTOSCOPY, WITH URETERAL STENT REMOVAL;  Surgeon: Scooter Jin IV, MD;  Location: [facility] OR;  Service: Urology;  Laterality: Left;    CYSTOSCOPY W/ URETERAL STENT REMOVAL Left 2/4/2020    Procedure: CYSTOSCOPY, WITH URETERAL STENT REMOVAL;  Surgeon: Scooter Jin IV, MD;  Location: [facility] OR;  Service: Urology;  Laterality: Left;     ESOPHAGOGASTRODUODENOSCOPY N/A 2020    Procedure: EGD (ESOPHAGOGASTRODUODENOSCOPY);  Surgeon: Rosanna Harrington MD;  Location: [facility] ENDO;  Service: Endoscopy;  Laterality: N/A;    FOOT AMPUTATION THROUGH METATARSAL      left    FOOT SURGERY Bilateral     per patient multiple toe amputations prior to.  partial foot amputation:first great toe then other toes     KIDNEY SURGERY      per patient separation of horseshoe kidney @ time of AAA repair    LEFT HEART CATHETERIZATION Left 3/7/2019    Procedure: CATHETERIZATION, HEART, LEFT;  Surgeon: Adriel Boone MD;  Location: [facility] CATH LAB;  Service: Cardiology;  Laterality: Left;  630 admit for IV hydration  10am start    LUNG LOBECTOMY Right     per patient not cancer    right below knee amputation   (approx)    SMALL INTESTINE SURGERY      per patient partial @ time of aaa repair  not small bowel - large bowel bowel compromised bythtwe AAAbowel    TONSILLECTOMY   aprox    URETERAL STENT PLACEMENT Left     annually replaced since  or so  Dr Jin       Review of patient's allergies indicates:   Allergen Reactions    Morphine Itching     Family History     Problem Relation (Age of Onset)    COPD Mother    Cancer Mother    Diabetes Daughter    Heart disease Father        Tobacco Use    Smoking status: Former Smoker     Packs/day: 1.00     Years: 15.00     Pack years: 15.00     Last attempt to quit: 2009     Years since quittin.4    Smokeless tobacco: Never Used   Substance and Sexual Activity    Alcohol use: No    Drug use: No     Comment: Is on prescription opiod, no non prescribed use    Sexual activity: Not Currently     Partners: Female     Review of Systems   Constitutional: Negative for fever.   HENT: Negative for hearing loss.    Eyes: Negative for visual disturbance.   Respiratory: Negative for cough and shortness of breath.    Cardiovascular: Negative for chest pain and palpitations.    Gastrointestinal:        As per HPI.   Genitourinary: Negative for difficulty urinating, dysuria, frequency and hematuria.   Musculoskeletal: Negative for arthralgias and back pain.   Skin: Negative for color change and rash.   Neurological: Positive for weakness and light-headedness. Negative for seizures, syncope, numbness and headaches.   Hematological: Bruises/bleeds easily.   Psychiatric/Behavioral: The patient is not nervous/anxious.      Objective:     Vital Signs (Most Recent):  Temp: 97.6 °F (36.4 °C) (06/10/20 0732)  Pulse: 76 (06/10/20 0732)  Resp: 14 (06/10/20 0732)  BP: 135/68 (06/10/20 0732)  SpO2: 98 % (06/10/20 0732) Vital Signs (24h Range):  Temp:  [97.4 °F (36.3 °C)-98.4 °F (36.9 °C)] 97.6 °F (36.4 °C)  Pulse:  [75-92] 76  Resp:  [10-22] 14  SpO2:  [97 %-100 %] 98 %  BP: ()/(56-73) 135/68     Weight: 79.4 kg (175 lb) (06/09/20 1125)  Body mass index is 23.73 kg/m².      Intake/Output Summary (Last 24 hours) at 6/10/2020 0922  Last data filed at 6/10/2020 0611  Gross per 24 hour   Intake 1852.5 ml   Output 500 ml   Net 1352.5 ml       Lines/Drains/Airways     Peripheral Intravenous Line                 Peripheral IV - Single Lumen 06/09/20 0910 18 G Right Forearm 1 day                Physical Exam   Constitutional: He is oriented to person, place, and time. He appears well-developed and well-nourished.   HENT:   Head: Normocephalic and atraumatic.   Eyes: EOM are normal.   Neck: Normal range of motion. Neck supple.   Cardiovascular: Normal rate, regular rhythm and normal heart sounds.   No murmur heard.  Pulmonary/Chest: Effort normal and breath sounds normal. No respiratory distress. He has no wheezes.   Abdominal: Soft. Bowel sounds are normal. He exhibits no distension and no mass. There is no hepatomegaly. There is no tenderness.   Musculoskeletal: He exhibits no edema.   Right BKA.   Neurological: He is alert and oriented to person, place, and time. No cranial nerve deficit. Gait  normal.   Skin: Skin is warm and dry. No rash noted.   Psychiatric: He has a normal mood and affect.       Significant Labs:  CBC:   Recent Labs   Lab 06/09/20  0024 06/09/20  0918 06/09/20  1524 06/09/20  2154 06/10/20  0043   WBC 12.96* 8.68  --   --   --    HGB 7.9* 7.5* 7.9* 8.2* 8.0*   HCT 26.5* 24.4* 25.7* 26.0* 25.4*    237  --   --   --      CMP:   Recent Labs   Lab 06/10/20  0523      CALCIUM 7.9*   ALBUMIN 3.0*   PROT 6.1      K 4.4   CO2 19*   *   BUN 44*   CREATININE 3.0*   ALKPHOS 115   ALT 9*   AST 9*   BILITOT 0.4     Coagulation:   Recent Labs   Lab 06/09/20  0024   INR 1.0   APTT 24.5       Significant Imaging:  Imaging results within the past 24 hours have been reviewed.    Assessment/Plan:     * Acute lower GI bleeding  Patient with several potential sources of bleeding. However, the onset and volume would favor post-polypectomy bleed or diverticular. He hasn't restarted Plavix so not sure it would be the mass bleeding since it wasn't bleeding much before the colonoscopy. Either way, the bleeding seems to have stopped. Hgb stable overnight. Vitals stable. No overt bleeding this morning. No colonoscopy planned at this time. Will continue to monitor.   Case discussed with Dr. Yang.   Agree with planned transfusion. Continue to monitor H/H.   Continue to hold Plavix.   Consider clear liquid diet today.     Acute blood loss anemia  See plan above.       Pulmonary nodules  Lung nodule noted.     Colon adenocarcinoma  Discussed treatment options and plan with Dr. Yang.          Thank you for your consult. I will follow-up with patient. Please contact us if you have any additional questions.    Lv Mckeon PA-C  Gastroenterology  [facility] [place]

## 2020-06-10 NOTE — CONSULTS
Consulted to this 70 year old male patient for pre op ostomy marking, plan for surgery tomorrow.   Abdomen assessed sitting upright and lying flat. Multiple creases/skin folds noted. Patient reports he usually wears elastic waist band pants at the hips. Abdomen shaved and scrubbed with chloraprep scrub. Marked all 4 quadrants with sterile surgical marker, then covered with sterile tegaderm. Beginning ostomy education provided to patient and his wife. Many questions asked and answered. Will follow postop for ostomy management and education.

## 2020-06-10 NOTE — ASSESSMENT & PLAN NOTE
69yo M with multiple medical comorbidities who presents with transverse vs descending colonic adenocarcinoma with possible metastatic lung disease with acute lower GI bleed, recurrent    - No further bleeding overnight or this AM. Possible that bleed was from polypectomy site and this has stopped, but still possible was from primary tumor. May still need resection but may not need as urgent intervention if bleeding stops. Will need multidisciplinary discussion with GI, Cards and HemeOnc, along with CRS to determine further course  - may need open colectomy in near future, possibly during this admission, as he is currently readmitted with bleeding off anticoagulation and this bleeding will prevent any other interventions to be done, including cardiac interventions, while ongoing anemia with bleeding.  Long discussion with the patient his wife that if this is the necessary steps, he has a very high risk surgical candidate given his multiple medical comorbidities as well as ongoing cardiac issues, renal issues and other organ dysfunction.  Patient voiced understanding of this and is willing to accept whatever we recommend  - if needs an operation, patient will require open partial colectomy with likely end colostomy  - need to aggressively resuscitate, especially with blood products.  Ideally would achieve hemoglobin of 10 or higher given his cardiac history  - Will closely follow

## 2020-06-10 NOTE — HPI
69 yo male preop clearance of partial colectomy  PMH CAD multivessel disease, not cabg candidate due to high risk, also preferred medicine Rx again high risk PCI, normal EF, h/o AAA disease status post resection and graft with subsequent MRSA infection status post axilla femoral bypass in 2014, carotid artery disease status post left CEA, CVA, PAD status post right BKA and left forefoot amputation in the past.   Pt had recurrent LGI bleeding and Dx of colonic adenocarcinoma and post coloscopy done one week ago.  Pt denied recent chest pin, palpitation and orthopnea  Chronic MACKEY mild and stable  Today HGB 7.5, ekg NSR, ECHO normal EF   cardiac PET mild septal ischemia

## 2020-06-10 NOTE — ASSESSMENT & PLAN NOTE
Patient with several potential sources of bleeding. However, the onset and volume would favor post-polypectomy bleed or diverticular. He hasn't restarted Plavix so not sure it would be the mass bleeding since it wasn't bleeding much before the colonoscopy. Either way, the bleeding seems to have stopped. Hgb stable overnight. Vitals stable. No overt bleeding this morning. No colonoscopy planned at this time. Will continue to monitor.   Case discussed with Dr. Yang.   Agree with planned transfusion. Continue to monitor H/H.   Continue to hold Plavix.   Consider clear liquid diet today.

## 2020-06-10 NOTE — ANESTHESIA PREPROCEDURE EVALUATION
06/10/2020  Kodi Ribeiro is a 70 y.o., male.    Anesthesia Evaluation    I have reviewed the Patient Summary Reports.    I have reviewed the Nursing Notes.    I have reviewed the Medications.     Review of Systems  Anesthesia Hx:  No problems with previous Anesthesia  History of prior surgery of interest to airway management or planning: Previous anesthesia: General  Denies Personal Hx of Anesthesia complications.   Social:  Former Smoker    Hematology/Oncology:     Oncology Normal    -- Anemia:   EENT/Dental:EENT/Dental Normal   Cardiovascular:   Hypertension Past MI CAD (Non operable)  CABG/stent (C Stents x 3 2000)  Angina CHF ECG has been reviewed. Echo 6/9/2020  · Concentric left ventricular remodeling.  · Normal left ventricular systolic function. The estimated ejection fraction is 60%.  · Normal LV diastolic function.     Card Clearance  Hig periop risk of CV events for high risk procedure.  Good functional and exercise capacity.  No active angina, active arrhythmia and CHF symptoms.  PRBC transfusion to keep HGB > 9.0 preop  Ok to proceed the scheduled surgery without further cardiac study.   Pulmonary:   COPD    Renal/:   Chronic Renal Disease (Horeshoe Kidney), CRI    Hepatic/GI:   Hiatal Hernia, GERD    Musculoskeletal:  Musculoskeletal Normal    Neurological:   CVA   Peripheral Neuropathy    Endocrine:  Endocrine Normal    Dermatological:  Skin Normal    Psych:   Psychiatric History (Dysthymia)          Physical Exam  General:  Well nourished    Airway/Jaw/Neck:  Airway Findings: Mouth Opening: Normal Tongue: Normal  Mallampati: II      Dental:  Dental Findings: (Edentulous at top. Bad dentition at the bottom) Edentulous   Chest/Lungs:  Chest/Lungs Clear    Heart/Vascular:  Heart Findings: Normal Heart murmur: negative       Mental Status:  Mental Status Findings:  Cooperative, Alert and  Oriented         Anesthesia Plan  Type of Anesthesia, risks & benefits discussed:  Anesthesia Type:  general  Patient's Preference:   Intra-op Monitoring Plan: standard ASA monitors, arterial line and central line  Intra-op Monitoring Plan Comments:   Post Op Pain Control Plan: multimodal analgesia  Post Op Pain Control Plan Comments:   Induction:   IV  Beta Blocker:  Patient is not currently on a Beta-Blocker (No further documentation required).       Informed Consent: Patient understands risks and agrees with Anesthesia plan.  Questions answered. Anesthesia consent signed with patient.  ASA Score: 4     Day of Surgery Review of History & Physical: I have interviewed and examined the patient. I have reviewed the patient's H&P dated:    H&P update referred to the surgeon.     Anesthesia Plan Notes: This is a 69 yo male scheduled for  partial colectomy tomorrow 11 June 2020 with a PMH  which includes, but is not limited to, CAD (multivessel disease, not cabg candidate due to his being at high risk). He was also offered medicine Rx vs  high risk PCI  and chose medical treatment. He has a normal EF and reports no angina recently. Hx also includes AAA disease, status post resection and graft with subsequent MRSA infection. History continues with pt being S/P axilla - femoral bypass in 2014, carotid artery disease status post left CEA, CVA, PAD status post right BKA and left forefoot amputation.  Pt had recurrent LGI bleeding and a Dx of colonic adenocarcinoma S/P coloscopy done one week ago.  I discussed this case with the surgical service. After reviewing the chart and examining the patient, I recommend a general anesthesia with a pre-induction arterial line, with central with placement of a central venous catheter prior to incision.        Ready For Surgery From Anesthesia Perspective.

## 2020-06-10 NOTE — CONSULTS
[facility] -   Cardiology  Consult Note    Patient Name: Kodi Ribeiro  MRN: [id number]  Admission Date: 6/8/2020  Hospital Length of Stay: 0 days  Code Status: Prior   Attending Provider: Ruben Hayes MD   Consulting Provider: Edenilson Beckman MD  Primary Care Physician: Norma Nuñez MD  Principal Problem:<principal problem not specified>    Patient information was obtained from patient and ER records.     Inpatient consult to Cardiology  Consult performed by: Edenilson Beckman MD  Consult ordered by: Kristan Faustin NP        Subjective:       HPI:   69 yo male preop clearance of partial colectomy  PMH CAD multivessel disease, not cabg candidate due to high risk, also preferred medicine Rx again high risk PCI, normal EF, h/o AAA disease status post resection and graft with subsequent MRSA infection status post axilla femoral bypass in 2014, carotid artery disease status post left CEA, CVA, PAD status post right BKA and left forefoot amputation in the past.   Pt had recurrent LGI bleeding and Dx of colonic adenocarcinoma and post coloscopy done one week ago.  Pt denied recent chest pin, palpitation and orthopnea  Chronic MACKEY mild and stable  Today HGB 7.5, ekg NSR, ECHO normal EF   cardiac PET mild septal ischemia        Past Medical History:   Diagnosis Date    Acute on chronic congestive heart failure 1/13/2020    Acute respiratory failure with hypoxia 1/14/2020    Analgesic nephropathy     Anemia     AP (angina pectoris) 1/11/2019    Arthritis     Colon polyp     Repeat colonoscopy due in 9/14    COPD exacerbation 2/6/2020    Coronary artery disease     Diverticulosis     colonoscopy 2/21/2014    Dysthymia 2/13/2020    Encounter for blood transfusion     GERD (gastroesophageal reflux disease)     Hemorrhoids     colonoscopy 2/21/2014    Horseshoe kidney     Hyperglycemia 3/17/2014    Hyperlipidemia     Hypertension     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    Late complications of  amputation stump     rseolved with further amputation( MRSA then none since 2014)    Lipoma of colon     colonoscopy 2/21/2014    Myocardial infarction     per patient 2000 & 9/2012    Peripheral vascular disease     Phantom limb syndrome     patient reports only intermittent not problematic, not worsening    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    Spinal cord disease     L4L5 disc    Stroke     Tobacco dependence     resolved    Ureteral stent retained        Past Surgical History:   Procedure Laterality Date    ABDOMINAL AORTIC ANEURYSM REPAIR      ABDOMINAL AORTIC ANEURYSM REPAIR  1996/2014    AMPUTATION, LOWER LIMB      AORTA - BILATERAL FEMORAL ARTERY BYPASS GRAFT  2014    Left and right leg    COLONOSCOPY N/A 6/2/2020    Procedure: COLONOSCOPY;  Surgeon: Rosanna Harrington MD;  Location: [facility] ENDO;  Service: Endoscopy;  Laterality: N/A;    CORONARY ANGIOPLASTY WITH STENT PLACEMENT  2000    Three placed in heart    CYSTOSCOPY W/ RETROGRADES Left 5/29/2018    Procedure: CYSTOSCOPY, WITH RETROGRADE PYELOGRAM;  Surgeon: Scooter Jin IV, MD;  Location: [facility] OR;  Service: Urology;  Laterality: Left;    CYSTOSCOPY W/ URETERAL STENT PLACEMENT Left 5/29/2018    Procedure: CYSTOSCOPY, WITH URETERAL STENT INSERTION;  Surgeon: Scooter Jin IV, MD;  Location: [facility] OR;  Service: Urology;  Laterality: Left;    CYSTOSCOPY W/ URETERAL STENT PLACEMENT Left 2/4/2020    Procedure: CYSTOSCOPY, WITH URETERAL STENT INSERTION;  Surgeon: Scooter Jin IV, MD;  Location: [facility] OR;  Service: Urology;  Laterality: Left;    CYSTOSCOPY W/ URETERAL STENT REMOVAL Left 5/29/2018    Procedure: CYSTOSCOPY, WITH URETERAL STENT REMOVAL;  Surgeon: Scooter Jin IV, MD;  Location: [facility] OR;  Service: Urology;  Laterality: Left;    CYSTOSCOPY W/ URETERAL STENT REMOVAL Left 2/4/2020    Procedure: CYSTOSCOPY, WITH URETERAL STENT REMOVAL;  Surgeon: Scooter Jin IV, MD;  Location: [facility] OR;  Service:  Urology;  Laterality: Left;    ESOPHAGOGASTRODUODENOSCOPY N/A 6/2/2020    Procedure: EGD (ESOPHAGOGASTRODUODENOSCOPY);  Surgeon: Rosanna Harrington MD;  Location: [facility] ENDO;  Service: Endoscopy;  Laterality: N/A;    FOOT AMPUTATION THROUGH METATARSAL  1996    left    FOOT SURGERY Bilateral 1980's    per patient multiple toe amputations prior to.  partial foot amputation:first great toe then other toes     KIDNEY SURGERY  2014    per patient separation of horseshoe kidney @ time of AAA repair    LEFT HEART CATHETERIZATION Left 3/7/2019    Procedure: CATHETERIZATION, HEART, LEFT;  Surgeon: Adriel Boone MD;  Location: [facility] CATH LAB;  Service: Cardiology;  Laterality: Left;  630 admit for IV hydration  10am start    LUNG LOBECTOMY Right 1970s    per patient not cancer    right below knee amputation  2009 (approx)    SMALL INTESTINE SURGERY  2014    per patient partial @ time of aaa repair  not small bowel - large bowel bowel compromised bythtwe AAAbowel    TONSILLECTOMY  1955 aprox    URETERAL STENT PLACEMENT Left     annually replaced since 2012 or so  Dr Jin       Review of patient's allergies indicates:   Allergen Reactions    Morphine Itching       No current facility-administered medications on file prior to encounter.      Current Outpatient Medications on File Prior to Encounter   Medication Sig    albuterol-ipratropium (DUO-NEB) 2.5 mg-0.5 mg/3 mL nebulizer solution Take 3 mLs by nebulization every 8 (eight) hours as needed for Wheezing or Shortness of Breath. Rescue    amLODIPine (NORVASC) 10 MG tablet TAKE 1 TABLET EVERY DAY    aspirin (ECOTRIN) 81 MG EC tablet Take 1 tablet (81 mg total) by mouth once daily.    atorvastatin (LIPITOR) 40 MG tablet Take 1 tablet (40 mg total) by mouth once daily.    carvedilol (COREG) 25 MG tablet Take 1 tablet (25 mg total) by mouth 2 (two) times daily with meals.    cetirizine (ZYRTEC) 10 MG tablet Take 10 mg by mouth once daily.    folic acid-vit  B6-vit B12 2.5-25-2 mg (FOLBIC OR EQUIV) 2.5-25-2 mg Tab Take 1 tablet by mouth once daily.    furosemide (LASIX) 20 MG tablet Take two tab on Mon/Wed/Friday and one tab on Tues/Thurs/ Sat/    hydrALAZINE (APRESOLINE) 25 MG tablet Take 1 tablet (25 mg total) by mouth every 12 (twelve) hours.    isosorbide mononitrate (IMDUR) 120 MG 24 hr tablet Take 1 tablet by mouth once daily.    mupirocin (BACTROBAN) 2 % ointment Apply topically 3 (three) times daily.    nitroglycerin (NITROSTAT) 0.6 MG Subl Place 1 tablet (0.6 mg total) under the tongue every 5 (five) minutes as needed (max 3/ per episode).    omeprazole (PRILOSEC) 20 MG capsule TAKE 1 CAPSULE TWICE DAILY    OXYGEN-AIR DELIVERY SYSTEMS MISC by Misc.(Non-Drug; Combo Route) route.    sertraline (ZOLOFT) 50 MG tablet Take 1 tablet (50 mg total) by mouth once daily.    triamcinolone acetonide 0.1% (KENALOG) 0.1 % cream Apply topically 2 (two) times daily.     Family History     Problem Relation (Age of Onset)    COPD Mother    Cancer Mother    Diabetes Daughter    Heart disease Father        Tobacco Use    Smoking status: Former Smoker     Packs/day: 1.00     Years: 15.00     Pack years: 15.00     Last attempt to quit: 2009     Years since quittin.4    Smokeless tobacco: Never Used   Substance and Sexual Activity    Alcohol use: No    Drug use: No     Comment: Is on prescription opiod, no non prescribed use    Sexual activity: Not Currently     Partners: Female     Review of Systems   Constitution: Negative for decreased appetite, diaphoresis, fever, malaise/fatigue and night sweats.   HENT: Negative for nosebleeds.    Eyes: Negative for blurred vision and double vision.   Cardiovascular: Positive for dyspnea on exertion. Negative for chest pain, claudication, irregular heartbeat, leg swelling, near-syncope, orthopnea, palpitations, paroxysmal nocturnal dyspnea and syncope.   Respiratory: Negative for cough, shortness of breath, sleep  disturbances due to breathing, snoring, sputum production and wheezing.    Endocrine: Negative for cold intolerance and polyuria.   Hematologic/Lymphatic: Does not bruise/bleed easily.   Skin: Negative for rash.   Musculoskeletal: Negative for back pain, falls, joint pain, joint swelling and neck pain.   Gastrointestinal: Negative for abdominal pain, heartburn, nausea and vomiting.        LGIB+   Genitourinary: Negative for dysuria, frequency and hematuria.   Neurological: Negative for difficulty with concentration, dizziness, focal weakness, headaches, light-headedness, numbness, seizures and weakness.   Psychiatric/Behavioral: Negative for depression, memory loss and substance abuse. The patient does not have insomnia.    Allergic/Immunologic: Negative for HIV exposure and hives.     Objective:     Vital Signs (Most Recent):  Temp: 97.7 °F (36.5 °C) (06/09/20 2049)  Pulse: 83 (06/09/20 2049)  Resp: 18 (06/09/20 2049)  BP: (!) 150/68 (06/09/20 2049)  SpO2: 97 % (06/09/20 2049) Vital Signs (24h Range):  Temp:  [97.4 °F (36.3 °C)-98.4 °F (36.9 °C)] 97.7 °F (36.5 °C)  Pulse:  [68-92] 83  Resp:  [10-26] 18  SpO2:  [97 %-100 %] 97 %  BP: ()/(56-82) 150/68     Weight: 79.4 kg (175 lb)  Body mass index is 23.73 kg/m².    SpO2: 97 %  O2 Device (Oxygen Therapy): room air      Intake/Output Summary (Last 24 hours) at 6/9/2020 2121  Last data filed at 6/9/2020 2049  Gross per 24 hour   Intake 2202.5 ml   Output --   Net 2202.5 ml       Lines/Drains/Airways     Peripheral Intravenous Line                 Peripheral IV - Single Lumen 06/09/20 0910 18 G Right Forearm less than 1 day                Physical Exam   Constitutional: He is oriented to person, place, and time. He appears well-nourished.   HENT:   Head: Normocephalic.   Eyes: Pupils are equal, round, and reactive to light.   Neck: Normal carotid pulses and no JVD present. Carotid bruit is not present. No thyromegaly present.   Cardiovascular: Normal rate, regular  rhythm, normal heart sounds and normal pulses.  No extrasystoles are present. PMI is not displaced. Exam reveals no gallop and no S3.   No murmur heard.  Pulmonary/Chest: Breath sounds normal. No stridor. No respiratory distress.   Abdominal: Soft. Bowel sounds are normal. There is no tenderness. There is no rebound.   Neurological: He is alert and oriented to person, place, and time.   Skin: Skin is intact. No rash noted.   Psychiatric: His behavior is normal.       Significant Labs:   ABG: No results for input(s): PH, PCO2, HCO3, POCSATURATED, BE in the last 48 hours., Blood Culture: No results for input(s): LABBLOO in the last 48 hours., BMP:   Recent Labs   Lab 06/09/20  0024   *      K 4.5      CO2 16*   BUN 44*   CREATININE 3.2*   CALCIUM 8.2*   , CMP   Recent Labs   Lab 06/09/20  0024      K 4.5      CO2 16*   *   BUN 44*   CREATININE 3.2*   CALCIUM 8.2*   PROT 7.4   ALBUMIN 3.3*   BILITOT 0.3   ALKPHOS 164*   AST 17   ALT 14   ANIONGAP 11   ESTGFRAFRICA 21*   EGFRNONAA 18*   , CBC   Recent Labs   Lab 06/09/20  0024 06/09/20  0918 06/09/20  1524   WBC 12.96* 8.68  --    HGB 7.9* 7.5* 7.9*   HCT 26.5* 24.4* 25.7*    237  --    , INR   Recent Labs   Lab 06/09/20  0024   INR 1.0   , Lipid Panel No results for input(s): CHOL, HDL, LDLCALC, TRIG, CHOLHDL in the last 48 hours. and Troponin   Recent Labs   Lab 06/09/20  1524   TROPONINI 0.016       Significant Imaging: EKG:      Assessment and Plan:     Colon adenocarcinoma  F/u with primary service    Acute lower GI bleeding  See above note    Coronary artery disease involving native coronary artery of native heart without angina pectoris  See above note    Preop cardiovascular exam  Hig periop risk of CV events for high risk procedure.  Good functional and exercise capacity.  No active angina, active arrhythmia and CHF symptoms.  PRBC transfusion to keep HGB > 9.0 preop  Ok to proceed the scheduled surgery without  further cardiac study.  OK to hold Aspirin before the procedure.  Continue coreg and Statin periop.  Saw the pt and hs wife with Dr. Yang      Symptomatic anemia  See above note        VTE Risk Mitigation (From admission, onward)    None          Thank you for your consult. I will follow-up with patient. Please contact us if you have any additional questions.    Edenilson Beckman MD  Cardiology   [facility]

## 2020-06-10 NOTE — ASSESSMENT & PLAN NOTE
BUN/Creatinine 44/3.2   -in the setting of symptomatic anemia   -baseline 1.4-2.5  -blood transfusion in progress   -repeat CMP in am   6/10/20: Slightly improved sCr from yesterday (3.2 -> 3.0), which may slightly improve further with more blood

## 2020-06-10 NOTE — ASSESSMENT & PLAN NOTE
-in the setting of lower GI Bleed   -ASA and Plavix on hold   -H/H 7.5/24.4   -PRBCs transfused x 1 unit with current unit in progress   -serial H/H's in progress-will continue to transfuse as needed   -supplemental oxygen as needed     H&H stable over the past 1 day; no gross BRBPR since yesterday AM; s/p 3U PRBCs

## 2020-06-10 NOTE — HPI
The patient presented to the ER for hematochezia. He was recently diagnosed with colon cancer by colonoscopy on 06/02/20. Prior to the colonoscopy he had an episode of hematochezia and anemia. Plavix was stopped and the bleeding stopped. However, the hematochezia he experienced yesterday was different because it was at least 10 episodes of bloody diarrhea associated with abdominal cramping, nausea and weakness. Hgb had gone down to 7.5. Today he feels better. He hasn't had any bowel movements or bleeding today. No abdominal pain or nausea. Morning Hgb 8.0. He has been seen by Dr. Yang surgery being considered. Cardiology has seen to provide a risk assessment. Of note, the patient had polypectomy during the colonoscopy. He was also noted to have a non-bleeding AVM, diverticulosis and hemorrhoids.

## 2020-06-10 NOTE — ASSESSMENT & PLAN NOTE
Hig periop risk of CV events for high risk procedure.  Good functional and exercise capacity.  No active angina, active arrhythmia and CHF symptoms.  PRBC transfusion to keep HGB > 9.0 preop  Ok to proceed the scheduled surgery without further cardiac study.  OK to hold Aspirin before the procedure.  Continue coreg and Statin periop.  Saw the pt and hs wife with Dr. Yang

## 2020-06-10 NOTE — SUBJECTIVE & OBJECTIVE
Interval History: No further BMs since his BRBPR on Tu (yesterday) AM    Review of Systems   Constitutional: Negative for fever.   HENT: Negative for hearing loss.    Eyes: Negative for visual disturbance.   Respiratory: Negative for cough and shortness of breath.    Cardiovascular: Negative for chest pain and palpitations.   Gastrointestinal:        As per HPI.   Genitourinary: Negative for difficulty urinating, dysuria, frequency and hematuria.   Musculoskeletal: Negative for arthralgias and back pain.   Skin: Negative for color change and rash.   Neurological: Positive for weakness and light-headedness. Negative for seizures, syncope, numbness and headaches.   Hematological: Bruises/bleeds easily.   Psychiatric/Behavioral: The patient is not nervous/anxious.      Objective:     Vital Signs (Most Recent):  Temp: 98.3 °F (36.8 °C) (06/10/20 1640)  Pulse: 65 (06/10/20 1640)  Resp: 18 (06/10/20 1640)  BP: 137/63 (06/10/20 1640)  SpO2: 95 % (06/10/20 1640) Vital Signs (24h Range):  Temp:  [97.4 °F (36.3 °C)-98.4 °F (36.9 °C)] 98.3 °F (36.8 °C)  Pulse:  [65-86] 65  Resp:  [14-18] 18  SpO2:  [95 %-99 %] 95 %  BP: (129-159)/(58-73) 137/63     Weight: 79.4 kg (175 lb)  Body mass index is 23.73 kg/m².    Intake/Output Summary (Last 24 hours) at 6/10/2020 1845  Last data filed at 6/10/2020 1500  Gross per 24 hour   Intake 827.92 ml   Output 1100 ml   Net -272.08 ml      Physical Exam   Constitutional: He is oriented to person, place, and time. He appears well-developed.   HENT:   Head: Normocephalic and atraumatic.   Eyes: Conjunctivae and EOM are normal.   Neck: Normal range of motion. No thyromegaly present.   Cardiovascular: Normal rate and regular rhythm.   Pulmonary/Chest: Effort normal. No respiratory distress.   Abdominal:   Soft, nontender, nondistended, well-healed previous midline incision   Musculoskeletal: He exhibits no edema or tenderness.   Right s/p BKA   Neurological: He is alert and oriented to person,  place, and time.   Skin: Skin is warm and dry. Capillary refill takes less than 2 seconds. No rash noted.   Psychiatric: He has a normal mood and affect.       Significant Labs:   CBC:   Recent Labs   Lab 06/09/20  0024 06/09/20  0918 06/09/20  1524 06/09/20  2154 06/10/20  0043   WBC 12.96* 8.68  --   --   --    HGB 7.9* 7.5* 7.9* 8.2* 8.0*   HCT 26.5* 24.4* 25.7* 26.0* 25.4*    237  --   --   --      CMP:   Recent Labs   Lab 06/09/20  0024 06/10/20  0523    139   K 4.5 4.4    111*   CO2 16* 19*   * 100   BUN 44* 44*   CREATININE 3.2* 3.0*   CALCIUM 8.2* 7.9*   PROT 7.4 6.1   ALBUMIN 3.3* 3.0*   BILITOT 0.3 0.4   ALKPHOS 164* 115   AST 17 9*   ALT 14 9*   ANIONGAP 11 9   EGFRNONAA 18* 20*     All pertinent labs within the past 24 hours have been reviewed.    Significant Imaging:    Imaging Results          CT Abdomen Pelvis  Without Contrast (Final result)  Result time 06/09/20 07:45:18   Procedure changed from CT Abdomen Pelvis With Contrast     Final result by Aurelio Curtis MD (06/09/20 07:45:18)                 Impression:      Overall findings are stable compared to prior exam.    All CT scans at this facility use dose modulation, iterative reconstruction, and/or weight based dosing when appropriate to reduce radiation dose to as low as reasonable achievable.      Electronically signed by: Aurelio Curtis MD  Date:    06/09/2020  Time:    07:45             Narrative:    EXAMINATION:  CT ABDOMEN PELVIS WITHOUT CONTRAST    CLINICAL HISTORY:  GI bleeding;Infection, abdomen-pelvis;recently diagnoised with colon CA, bloody stools today; drop in hb;    TECHNIQUE:  Low dose axial images, sagittal and coronal reformations were obtained from the lung bases to the pubic symphysis.  Oral contrast was not administered.    COMPARISON:  06/05/2020    FINDINGS:  Heart: Normal size. No effusion.    Lung Bases: Emphysematous changes are noted.  Stable reticulonodular opacities within the right lower  lobe    Liver: Normal size and attenuation. No focal lesions.    Gallbladder: No calcified gallstones.    Bile Ducts: No dilatation.    Pancreas: No obvious mass. No peripancreatic fat stranding.    Spleen: Normal.    Adrenals: Normal.    Kidneys/Ureters: Kidneys are atrophic.  Probable nonobstructing stone lower pole right kidney.  Horseshoe kidney configuration noted.  Moderate perinephric stranding is seen bilaterally which could reflect upper tract infection but is nonspecific.  Stable small parapelvic cysts noted within the left kidney.  Left-sided double-J ureteral stent is noted in good position.    Bladder: Mild nonspecific wall thickening.  Prostate is not enlarged.    GI Tract/Mesentery: Small hiatal hernia.  Thickening seen along the greater curve of the stomach which could reflect gastritis.  Duodenal diverticulum suspected along the 2/3 portion duodenum.  Overall nonobstructive bowel gas pattern seen within the small bowel.  Fatty infiltration seen throughout the colon likely reflecting chronic inflammation.  Extensive diverticulosis seen throughout the descending and sigmoid colon.  Previously described clip within the cecum is no longer visualized.  No evidence of appendicitis.    Peritoneal Space: No ascites or free air.    Retroperitoneum: No significant adenopathy.    Abdominal wall: Normal.    Vasculature: No aneurysm. Aorta demonstrates severe atherosclerotic disease.  Decreased attenuation of the intravascular space thought to reflect changes from anemia.  Bypass graft seen within the right-sided subcutaneous tissues extending to the right superficial femoral vein.  Fem-fem bypass graft also noted within the subcutaneous tissues of the lower pelvis.  Chronic aortic occlusion suspected within the distal aorta which a valuation is limited by noncontrast.    Bones: No definite acute fracture.  Ankylosis is seen from L3 through L5.  Wedge deformity noted at L1.  Diffuse osteopenia.  Moderate  degenerative changes lower lumbar spine.                                 I have reviewed all pertinent imaging results/findings within the past 24 hours.

## 2020-06-10 NOTE — PROGRESS NOTES
[facility] Hospital Medicine  Progress Note    Patient Name: Kodi Ribeiro  MRN: [id number]  Patient Class: IP- Inpatient   Admission Date: 6/8/2020  Length of Stay: 1 days  Attending Physician: Ruben Hayes MD  Primary Care Provider: Norma Nuñez MD        Subjective:     Principal Problem:Acute lower GI bleeding        HPI:  Kodi Ribeiro is a 70 y.o. male patient with a PMHx of CVA, CHF, COPD, CAD, diverticulosis, GERD, PVD, hyperglycemia, HLD, HTN, Anemia, and MI who presents to the Emergency Department for evaluation of rectal bleeding which onset gradually earlier today. Pt reports having had a bloody stool this morning. Pt states that he was on Plavix, but was stopped by his PCP 10 days ago when he had similar sxs but has continued to take ASA. Pt had a hx of hematochezia since 01/2020 and had colonoscopy last Tuesday with colon cancer diagnosis.  Pt seen by Dr. Wilkerson (General Surgery) as outpatient yesterday with possible options for surgical intervention discussed.  Symptoms are intermittent and moderate in severity. No mitigating or exacerbating factors reported. Associated sxs include constipation, hematochezia, abdominal pain when making a BM, diaphoresis, chills, and syncope.  Patient denies any CP, hematuria, N/V, SOB, light-headedness, and all other sxs at this time. No prior Tx reported. No further complaints or concerns at this time.  Pt denies smoking and use of ETOH.  Pt is a full code and Laury STEWARD'Connnor (wife) at [phone] is the surrogate decision maker.  ER work up showed: H/H 7.5/24.4, CO2 16, Ca 8.2, BUN/Creatinine 44/3.2, Glucose 159, and Albumin 3.3.  CT abdomen/pelvis performed and CT of chest/abdomen/pelvis results on 6/5/2020 reviewed.  ER discussed case with GI and General Surgery.  Hospital Medicine contacted for admission with patient placed in Observation for further evaluation.      Overview/Hospital Course:  6/10/20: No events; Last BM was yesterday  AM (BRBPR), no BM since, which he attributes to being NPO, but today was on clears and is NPO after MN for scheduled general surgery for possible resection of adenocarcinoma in AM. No cp or sob.  - Ordered 2 more units RBCs (completed a total of 3U yesterday).     Interval History: No further BMs since his BRBPR on Tu (yesterday) AM    Review of Systems   Constitutional: Negative for fever.   HENT: Negative for hearing loss.    Eyes: Negative for visual disturbance.   Respiratory: Negative for cough and shortness of breath.    Cardiovascular: Negative for chest pain and palpitations.   Gastrointestinal:        As per HPI.   Genitourinary: Negative for difficulty urinating, dysuria, frequency and hematuria.   Musculoskeletal: Negative for arthralgias and back pain.   Skin: Negative for color change and rash.   Neurological: Positive for weakness and light-headedness. Negative for seizures, syncope, numbness and headaches.   Hematological: Bruises/bleeds easily.   Psychiatric/Behavioral: The patient is not nervous/anxious.      Objective:     Vital Signs (Most Recent):  Temp: 98.3 °F (36.8 °C) (06/10/20 1640)  Pulse: 65 (06/10/20 1640)  Resp: 18 (06/10/20 1640)  BP: 137/63 (06/10/20 1640)  SpO2: 95 % (06/10/20 1640) Vital Signs (24h Range):  Temp:  [97.4 °F (36.3 °C)-98.4 °F (36.9 °C)] 98.3 °F (36.8 °C)  Pulse:  [65-86] 65  Resp:  [14-18] 18  SpO2:  [95 %-99 %] 95 %  BP: (129-159)/(58-73) 137/63     Weight: 79.4 kg (175 lb)  Body mass index is 23.73 kg/m².    Intake/Output Summary (Last 24 hours) at 6/10/2020 1845  Last data filed at 6/10/2020 1500  Gross per 24 hour   Intake 827.92 ml   Output 1100 ml   Net -272.08 ml      Physical Exam   Constitutional: He is oriented to person, place, and time. He appears well-developed.   HENT:   Head: Normocephalic and atraumatic.   Eyes: Conjunctivae and EOM are normal.   Neck: Normal range of motion. No thyromegaly present.   Cardiovascular: Normal rate and regular rhythm.    Pulmonary/Chest: Effort normal. No respiratory distress.   Abdominal:   Soft, nontender, nondistended, well-healed previous midline incision   Musculoskeletal: He exhibits no edema or tenderness.   Right s/p BKA   Neurological: He is alert and oriented to person, place, and time.   Skin: Skin is warm and dry. Capillary refill takes less than 2 seconds. No rash noted.   Psychiatric: He has a normal mood and affect.       Significant Labs:   CBC:   Recent Labs   Lab 06/09/20  0024 06/09/20  0918 06/09/20  1524 06/09/20  2154 06/10/20  0043   WBC 12.96* 8.68  --   --   --    HGB 7.9* 7.5* 7.9* 8.2* 8.0*   HCT 26.5* 24.4* 25.7* 26.0* 25.4*    237  --   --   --      CMP:   Recent Labs   Lab 06/09/20  0024 06/10/20  0523    139   K 4.5 4.4    111*   CO2 16* 19*   * 100   BUN 44* 44*   CREATININE 3.2* 3.0*   CALCIUM 8.2* 7.9*   PROT 7.4 6.1   ALBUMIN 3.3* 3.0*   BILITOT 0.3 0.4   ALKPHOS 164* 115   AST 17 9*   ALT 14 9*   ANIONGAP 11 9   EGFRNONAA 18* 20*     All pertinent labs within the past 24 hours have been reviewed.    Significant Imaging:    Imaging Results          CT Abdomen Pelvis  Without Contrast (Final result)  Result time 06/09/20 07:45:18   Procedure changed from CT Abdomen Pelvis With Contrast     Final result by Aurelio Curtis MD (06/09/20 07:45:18)                 Impression:      Overall findings are stable compared to prior exam.    All CT scans at this facility use dose modulation, iterative reconstruction, and/or weight based dosing when appropriate to reduce radiation dose to as low as reasonable achievable.      Electronically signed by: Aurelio Curtis MD  Date:    06/09/2020  Time:    07:45             Narrative:    EXAMINATION:  CT ABDOMEN PELVIS WITHOUT CONTRAST    CLINICAL HISTORY:  GI bleeding;Infection, abdomen-pelvis;recently diagnoised with colon CA, bloody stools today; drop in hb;    TECHNIQUE:  Low dose axial images, sagittal and coronal reformations were  obtained from the lung bases to the pubic symphysis.  Oral contrast was not administered.    COMPARISON:  06/05/2020    FINDINGS:  Heart: Normal size. No effusion.    Lung Bases: Emphysematous changes are noted.  Stable reticulonodular opacities within the right lower lobe    Liver: Normal size and attenuation. No focal lesions.    Gallbladder: No calcified gallstones.    Bile Ducts: No dilatation.    Pancreas: No obvious mass. No peripancreatic fat stranding.    Spleen: Normal.    Adrenals: Normal.    Kidneys/Ureters: Kidneys are atrophic.  Probable nonobstructing stone lower pole right kidney.  Horseshoe kidney configuration noted.  Moderate perinephric stranding is seen bilaterally which could reflect upper tract infection but is nonspecific.  Stable small parapelvic cysts noted within the left kidney.  Left-sided double-J ureteral stent is noted in good position.    Bladder: Mild nonspecific wall thickening.  Prostate is not enlarged.    GI Tract/Mesentery: Small hiatal hernia.  Thickening seen along the greater curve of the stomach which could reflect gastritis.  Duodenal diverticulum suspected along the 2/3 portion duodenum.  Overall nonobstructive bowel gas pattern seen within the small bowel.  Fatty infiltration seen throughout the colon likely reflecting chronic inflammation.  Extensive diverticulosis seen throughout the descending and sigmoid colon.  Previously described clip within the cecum is no longer visualized.  No evidence of appendicitis.    Peritoneal Space: No ascites or free air.    Retroperitoneum: No significant adenopathy.    Abdominal wall: Normal.    Vasculature: No aneurysm. Aorta demonstrates severe atherosclerotic disease.  Decreased attenuation of the intravascular space thought to reflect changes from anemia.  Bypass graft seen within the right-sided subcutaneous tissues extending to the right superficial femoral vein.  Fem-fem bypass graft also noted within the subcutaneous tissues of  the lower pelvis.  Chronic aortic occlusion suspected within the distal aorta which a valuation is limited by noncontrast.    Bones: No definite acute fracture.  Ankylosis is seen from L3 through L5.  Wedge deformity noted at L1.  Diffuse osteopenia.  Moderate degenerative changes lower lumbar spine.                                 I have reviewed all pertinent imaging results/findings within the past 24 hours.      Assessment/Plan:      * Acute lower GI bleeding  -in the setting of adenocarcinoma   -recent colonoscopy with clips within the cecum, with wall thickening present corresponding to the colon neoplasm  -Plavix held 10 days ago by PCP   -symptoms present intermittently since 01/2020  -ASA held   -PPI   -case discussed with GI with symptoms likely due to colon adenocarcinoma and no further recommendations   -General Surgery on consult   -serial H/H in progress   -downward trend noted with PRBCs transfused x 1 unit in ED and additional unit in progress      Colon adenocarcinoma  -pt seen outpatient by Dr. Yang on yesterday   -recent colonoscopy per GI on last Thursday   -options for possible surgical intervention   -Cardiology consulted   -EKG, Echo, and chest xray pending   -antiemetics as needed   -analgesia as needed       Leukocytosis  -likely reactive in the setting of active bleed   -resolved upon repeat CBC post transfusion   -monitor   -repeat CBC in am       Pulmonary nodules  -imaging on 6/5/2020 supports 3 noncalcified pulmonary nodules, measuring between 6 and 8 mm in the middle lobe and left upper lobe.  Due to history early metastasis must be considered.  -pt to benefit from outpatient follow up and further evaluation     COPD (chronic obstructive pulmonary disease)  -Duonebs as needed   -supplemental oxygen as needed       Coronary artery disease involving native coronary artery of native heart without angina pectoris  -ASA and Plavix held due to bleeding   -will resume Coreg when appropriate    -Statin and Imdur continued   -s/p stents x 3 in 2000 and MI       Acute on chronic kidney failure  BUN/Creatinine 44/3.2   -in the setting of symptomatic anemia   -baseline 1.4-2.5  -blood transfusion in progress   -repeat CMP in am   6/10/20: Slightly improved sCr from yesterday (3.2 -> 3.0), which may slightly improve further with more blood    PVD (peripheral vascular disease)  -hx of   -s/p fem-fem bypass graft and left SFA   -pt was followed outpatient by Dr. Jamil ([facility])  -ASA and Plavix on hold due to bleeding       Acute blood loss anemia  -in the setting of lower GI Bleed   -ASA and Plavix on hold   -H/H 7.5/24.4   -PRBCs transfused x 1 unit with current unit in progress   -serial H/H's in progress-will continue to transfuse as needed   -supplemental oxygen as needed     H&H stable over the past 1 day; no gross BRBPR since yesterday AM; s/p 3U PRBCs    Essential hypertension  -stable   -will hold antihypertensives due to active bleed and resume as appropriate   6/10/20: Restarted a lower dose BB per card recs given BP and HR being able to tolerate given plan for surgery tomorrow    Hyperlipidemia  -Statin         VTE Risk Mitigation (From admission, onward)    None                Ruben Hayes MD  Department of Hospital Medicine   [facility]

## 2020-06-10 NOTE — PROGRESS NOTES
[facility]  Colorectal Surgery  Progress Note    Subjective:     History of Present Illness:  70 y.o. male  well known to me who is admitted to the hospital with a LGIB. Pt has known colonic adenocarcinoma which is likely his source of bleeding, as he had colonoscopy last week showing other polyps as well as known carcinoma. Pt has a PMHx significant for anemia, GERD, HTN, HLD, CKD, CHF, CVA, CAD and PVD with previous AAA repair who recently underwent a colonoscopy on 06/02/2020 where a malignant-appearing mass was seen in the transverse colon, possible descending colon and biopsied.  Biopsies did confirm this to be adenocarcinoma.  Patient states that he had been having both hematochezia and melena since January intermittently. Patient's last colonoscopy prior to this 1 was in February 2014.  Patient has significant past surgical history including multiple previous abdominal operations including AAA repair, AAA graft excision and right ax fem bypass with small-bowel resection.  He denies current fever, chills, nausea, vomiting.  Patient reports a significant past coronary history requiring stent placement and likely need for further stent/CABG but is unable to undergo at this time due to high risk nature and has chosen medical therapy. Surgery consulted for evaluation of colonic adenoCA with bleeding.    Post-Op Info:  Procedure(s) (LRB):  COLECTOMY, PARTIAL Open with end colostomy construction (N/A)         Interval History: HD stable overnight. No further bloody BMs or hematochezia, no BMs at all. H/H stable.    Medications:  Continuous Infusions:  Scheduled Meds:   pantoprazole  40 mg Intravenous Daily     PRN Meds:sodium chloride, sodium chloride, sodium chloride, acetaminophen, albuterol-ipratropium, diphenhydrAMINE, HYDROcodone-acetaminophen, ondansetron, promethazine (PHENERGAN) IVPB     Review of patient's allergies indicates:   Allergen Reactions    Morphine Itching     Objective:      Vital Signs (Most Recent):  Temp: 97.6 °F (36.4 °C) (06/10/20 0732)  Pulse: 76 (06/10/20 0732)  Resp: 14 (06/10/20 0732)  BP: 135/68 (06/10/20 0732)  SpO2: 98 % (06/10/20 0732) Vital Signs (24h Range):  Temp:  [97.4 °F (36.3 °C)-98.4 °F (36.9 °C)] 97.6 °F (36.4 °C)  Pulse:  [75-92] 76  Resp:  [10-22] 14  SpO2:  [97 %-100 %] 98 %  BP: ()/(56-73) 135/68     Weight: 79.4 kg (175 lb)  Body mass index is 23.73 kg/m².    Intake/Output - Last 3 Shifts       06/08 0700 - 06/09 0659 06/09 0700 - 06/10 0659 06/10 0700 - 06/11 0659    P.O.  0     Blood  1202.5     IV Piggyback  1000     Total Intake(mL/kg)  2202.5 (27.7)     Urine (mL/kg/hr)  500 (0.3)     Total Output  500     Net  +1702.5            Stool Occurrence  4 x           Physical Exam   Constitutional: He is oriented to person, place, and time. He appears well-developed.   HENT:   Head: Normocephalic and atraumatic.   Eyes: Conjunctivae and EOM are normal.   Neck: Normal range of motion. No thyromegaly present.   Cardiovascular: Normal rate and regular rhythm.   Pulmonary/Chest: Effort normal. No respiratory distress.   Abdominal:   Soft, nontender, nondistended, well-healed previous midline incision   Musculoskeletal: He exhibits no edema or tenderness.   R foot s/p amputation   Neurological: He is alert and oriented to person, place, and time.   Skin: Skin is warm and dry. Capillary refill takes less than 2 seconds. No rash noted.   Psychiatric: He has a normal mood and affect.       Significant Labs:  CBC:   Recent Labs   Lab 06/09/20  0918  06/10/20  0043   WBC 8.68  --   --    RBC 2.86*  --   --    HGB 7.5*   < > 8.0*   HCT 24.4*   < > 25.4*     --   --    MCV 85  --   --    MCH 26.2*  --   --    MCHC 30.7*  --   --     < > = values in this interval not displayed.     BMP:   Recent Labs   Lab 06/10/20  0523         K 4.4   *   CO2 19*   BUN 44*   CREATININE 3.0*   CALCIUM 7.9*     CMP:   Recent Labs   Lab 06/10/20  0523       CALCIUM 7.9*   ALBUMIN 3.0*   PROT 6.1      K 4.4   CO2 19*   *   BUN 44*   CREATININE 3.0*   ALKPHOS 115   ALT 9*   AST 9*   BILITOT 0.4     Assessment/Plan:     * Acute lower GI bleeding  See plan for colon adenocarcinoma    Colon adenocarcinoma  69yo M with multiple medical comorbidities who presents with transverse vs descending colonic adenocarcinoma with possible metastatic lung disease with acute lower GI bleed, recurrent    - No further bleeding overnight or this AM. Possible that bleed was from polypectomy site and this has stopped, but still possible was from primary tumor. May still need resection but may not need as urgent intervention if bleeding stops. Will need multidisciplinary discussion with GI, Cards and HemeOnc, along with CRS to determine further course  - may need open colectomy in near future, possibly during this admission, as he is currently readmitted with bleeding off anticoagulation and this bleeding will prevent any other interventions to be done, including cardiac interventions, while ongoing anemia with bleeding.  Long discussion with the patient his wife that if this is the necessary steps, he has a very high risk surgical candidate given his multiple medical comorbidities as well as ongoing cardiac issues, renal issues and other organ dysfunction.  Patient voiced understanding of this and is willing to accept whatever we recommend  - if needs an operation, patient will require open partial colectomy with likely end colostomy  - need to aggressively resuscitate, especially with blood products.  Ideally would achieve hemoglobin of 10 or higher given his cardiac history  - Will closely follow    COPD (chronic obstructive pulmonary disease)  Management per primary team    Coronary artery disease involving native coronary artery of native heart without angina pectoris  Management per primary team    Acute on chronic kidney failure  Management per primary  team    PVD (peripheral vascular disease)  Management per primary team    Acute blood loss anemia  Management per primary team    Essential hypertension  Management per primary team    Hyperlipidemia  Management per primary team        Leonardo Yang MD  Colorectal Surgery  [facility]

## 2020-06-10 NOTE — PLAN OF CARE
Initial assessment completed. Met with patient and his wife. Patient wheelchair dependent and needs assistance with ADLs (wife) Wife and daughter will help patient at home, colostomy scheduled for tomorrow will need to make sure patient has ostomy supplies. Patient will resume with [facility] after discharge. Patient Choice form signed.   Patient denies any post hospital needs or services at this time.Transitional Care Folder, Discharge Planning Begins on Admission pamphlet, [facility] Bedside Delivery pamphlet, Advance Directive information given to patient. Communication Board updated with CM name and contact information. Instructed patient or family to call with any questions or concerns. Patient's insurance is [employer].     PCP: Norma Nuñez  MyChart: No  Bedside Delivery- Yes  Follow-up Information     Norma Nuñez MD. Schedule an appointment as soon as possible for a visit in 3 days.    Specialty:  Family Medicine  Contact information:  [address] [address] [address] [phone]                   [facility] Mail [facility] - [facility] [address] [facility]  [address] [facility] [address]  Phone: [phone] Fax: [phone]    [id number] - [place], [place] - [address] [facility] [address]  [address]  [facility] [address]  Phone: [phone] Fax: [phone]    Norma Nuñez MD  Payor: [employer] MEDICARE / Plan: [employer] MEDICARE HMO / Product Type: Capitation /       06/10/20 1045   Discharge Assessment   Assessment Type Discharge Planning Assessment   Confirmed/corrected address and phone number on facesheet? Yes   Assessment information obtained from? Patient;Caregiver  (patient and wife)   Communicated expected length of stay with patient/caregiver no   Prior to hospitilization cognitive status: Alert/Oriented   Prior to hospitalization functional status: Independent;Assistive Equipment   Current  cognitive status: Alert/Oriented   Current Functional Status: Independent;Assistive Equipment   Facility Arrived From: home   Lives With spouse   Able to Return to Prior Arrangements yes   Is patient able to care for self after discharge? Yes   Who are your caregiver(s) and their phone number(s)? wife - Laury Martin [phone]   Patient's perception of discharge disposition home health  (will resume with [facility])   Readmission Within the Last 30 Days no previous admission in last 30 days   Patient currently being followed by outpatient case management? No   Patient currently receives any other outside agency services? Yes   How many hours a day does the patient receive services? 1   Name and contact number of agency or person providing outside services [facility]    Is it the patient/care giver preference to resume care with the current outside agency? Yes  (Patient choice form obtained)   Equipment Currently Used at Home none   Part D Coverage n/a   Do you have any problems affording any of your prescribed medications? No   Is the patient taking medications as prescribed? yes   Does the patient have transportation home? Yes   Transportation Anticipated family or friend will provide  (wife)   Dialysis Name and Scheduled days n/a   Does the patient receive services at the [facility]? No   Discharge Plan A Home Health;Home with family   Discharge Plan B Home with family;Home Health   DME Needed Upon Discharge  none   Patient/Family in Agreement with Plan yes

## 2020-06-10 NOTE — HOSPITAL COURSE
6/10/20: No events; Last BM was yesterday AM (BRBPR), no BM since, which he attributes to being NPO, but today was on clears and is NPO after MN for scheduled general surgery for possible resection of adenocarcinoma in AM. No cp or sob.  - Ordered 2 more units RBCs (completed a total of 3U yesterday).   6/11/20: s/p partial bowel resection with colostomy; tolerated surgery well without obvious intra-operative complications  - Pain control  - Continue to monitor closely  6/12/20: POD#2; no events o/n; sBP's 153-182 range, which is likely 2/2 pain, which he describes as abn tenderness w/ movement  6/13/20-doing well. NGT clamped. Passing some gas.   As of 6/14/2020 NGT removed, but will remain NPO except some ice chips. Will place order for PT/OT to encourage mobility. Creatinine continues to trend down. No gas noted from colostomy.   6/15/20  Diet advanced to clear liquid diet. Tolerating heparin infusion without bleeding complication. Ambulated down hallway with PT/OT.   6/17/20  Patient is doing well. Tolerating full diet. Spouse educated on ostomy care. He will be discharged with family support and home health.

## 2020-06-10 NOTE — ASSESSMENT & PLAN NOTE
-stable   -will hold antihypertensives due to active bleed and resume as appropriate   6/10/20: Restarted a lower dose BB per card recs given BP and HR being able to tolerate given plan for surgery tomorrow

## 2020-06-10 NOTE — SUBJECTIVE & OBJECTIVE
Interval History: HD stable overnight. No further bloody BMs or hematochezia, no BMs at all. H/H stable.    Medications:  Continuous Infusions:  Scheduled Meds:   pantoprazole  40 mg Intravenous Daily     PRN Meds:sodium chloride, sodium chloride, sodium chloride, acetaminophen, albuterol-ipratropium, diphenhydrAMINE, HYDROcodone-acetaminophen, ondansetron, promethazine (PHENERGAN) IVPB     Review of patient's allergies indicates:   Allergen Reactions    Morphine Itching     Objective:     Vital Signs (Most Recent):  Temp: 97.6 °F (36.4 °C) (06/10/20 0732)  Pulse: 76 (06/10/20 0732)  Resp: 14 (06/10/20 0732)  BP: 135/68 (06/10/20 0732)  SpO2: 98 % (06/10/20 0732) Vital Signs (24h Range):  Temp:  [97.4 °F (36.3 °C)-98.4 °F (36.9 °C)] 97.6 °F (36.4 °C)  Pulse:  [75-92] 76  Resp:  [10-22] 14  SpO2:  [97 %-100 %] 98 %  BP: ()/(56-73) 135/68     Weight: 79.4 kg (175 lb)  Body mass index is 23.73 kg/m².    Intake/Output - Last 3 Shifts       06/08 0700 - 06/09 0659 06/09 0700 - 06/10 0659 06/10 0700 - 06/11 0659    P.O.  0     Blood  1202.5     IV Piggyback  1000     Total Intake(mL/kg)  2202.5 (27.7)     Urine (mL/kg/hr)  500 (0.3)     Total Output  500     Net  +1702.5            Stool Occurrence  4 x           Physical Exam   Constitutional: He is oriented to person, place, and time. He appears well-developed.   HENT:   Head: Normocephalic and atraumatic.   Eyes: Conjunctivae and EOM are normal.   Neck: Normal range of motion. No thyromegaly present.   Cardiovascular: Normal rate and regular rhythm.   Pulmonary/Chest: Effort normal. No respiratory distress.   Abdominal:   Soft, nontender, nondistended, well-healed previous midline incision   Musculoskeletal: He exhibits no edema or tenderness.   R foot s/p amputation   Neurological: He is alert and oriented to person, place, and time.   Skin: Skin is warm and dry. Capillary refill takes less than 2 seconds. No rash noted.   Psychiatric: He has a normal mood and  affect.       Significant Labs:  CBC:   Recent Labs   Lab 06/09/20  0918  06/10/20  0043   WBC 8.68  --   --    RBC 2.86*  --   --    HGB 7.5*   < > 8.0*   HCT 24.4*   < > 25.4*     --   --    MCV 85  --   --    MCH 26.2*  --   --    MCHC 30.7*  --   --     < > = values in this interval not displayed.     BMP:   Recent Labs   Lab 06/10/20  0523         K 4.4   *   CO2 19*   BUN 44*   CREATININE 3.0*   CALCIUM 7.9*     CMP:   Recent Labs   Lab 06/10/20  0523      CALCIUM 7.9*   ALBUMIN 3.0*   PROT 6.1      K 4.4   CO2 19*   *   BUN 44*   CREATININE 3.0*   ALKPHOS 115   ALT 9*   AST 9*   BILITOT 0.4

## 2020-06-10 NOTE — SUBJECTIVE & OBJECTIVE
Past Medical History:   Diagnosis Date    Acute on chronic congestive heart failure 1/13/2020    Acute respiratory failure with hypoxia 1/14/2020    Analgesic nephropathy     Anemia     AP (angina pectoris) 1/11/2019    Arthritis     Colon polyp     Repeat colonoscopy due in 9/14    COPD exacerbation 2/6/2020    Coronary artery disease     Diverticulosis     colonoscopy 2/21/2014    Dysthymia 2/13/2020    Encounter for blood transfusion     GERD (gastroesophageal reflux disease)     Hemorrhoids     colonoscopy 2/21/2014    Horseshoe kidney     Hyperglycemia 3/17/2014    Hyperlipidemia     Hypertension     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    Late complications of amputation stump     rseolved with further amputation( MRSA then none since 2014)    Lipoma of colon     colonoscopy 2/21/2014    Myocardial infarction     per patient 2000 & 9/2012    Peripheral vascular disease     Phantom limb syndrome     patient reports only intermittent not problematic, not worsening    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    Spinal cord disease     L4L5 disc    Stroke     Tobacco dependence     resolved    Ureteral stent retained        Past Surgical History:   Procedure Laterality Date    ABDOMINAL AORTIC ANEURYSM REPAIR      ABDOMINAL AORTIC ANEURYSM REPAIR  1996/2014    AMPUTATION, LOWER LIMB      AORTA - BILATERAL FEMORAL ARTERY BYPASS GRAFT  2014    Left and right leg    COLONOSCOPY N/A 6/2/2020    Procedure: COLONOSCOPY;  Surgeon: Rosanna Harrington MD;  Location: [facility] ENDO;  Service: Endoscopy;  Laterality: N/A;    CORONARY ANGIOPLASTY WITH STENT PLACEMENT  2000    Three placed in heart    CYSTOSCOPY W/ RETROGRADES Left 5/29/2018    Procedure: CYSTOSCOPY, WITH RETROGRADE PYELOGRAM;  Surgeon: Scooter Jin IV, MD;  Location: [facility] OR;  Service: Urology;  Laterality: Left;    CYSTOSCOPY W/ URETERAL STENT PLACEMENT Left 5/29/2018    Procedure: CYSTOSCOPY, WITH URETERAL STENT  INSERTION;  Surgeon: Scooter Jin IV, MD;  Location: [facility] OR;  Service: Urology;  Laterality: Left;    CYSTOSCOPY W/ URETERAL STENT PLACEMENT Left 2/4/2020    Procedure: CYSTOSCOPY, WITH URETERAL STENT INSERTION;  Surgeon: Scooter Jin IV, MD;  Location: [facility] OR;  Service: Urology;  Laterality: Left;    CYSTOSCOPY W/ URETERAL STENT REMOVAL Left 5/29/2018    Procedure: CYSTOSCOPY, WITH URETERAL STENT REMOVAL;  Surgeon: Scooter Jin IV, MD;  Location: [facility] OR;  Service: Urology;  Laterality: Left;    CYSTOSCOPY W/ URETERAL STENT REMOVAL Left 2/4/2020    Procedure: CYSTOSCOPY, WITH URETERAL STENT REMOVAL;  Surgeon: Scooter Jin IV, MD;  Location: [facility] OR;  Service: Urology;  Laterality: Left;    ESOPHAGOGASTRODUODENOSCOPY N/A 6/2/2020    Procedure: EGD (ESOPHAGOGASTRODUODENOSCOPY);  Surgeon: Rosanna Harrington MD;  Location: [facility] ENDO;  Service: Endoscopy;  Laterality: N/A;    FOOT AMPUTATION THROUGH METATARSAL  1996    left    FOOT SURGERY Bilateral 1980's    per patient multiple toe amputations prior to.  partial foot amputation:first great toe then other toes     KIDNEY SURGERY  2014    per patient separation of horseshoe kidney @ time of AAA repair    LEFT HEART CATHETERIZATION Left 3/7/2019    Procedure: CATHETERIZATION, HEART, LEFT;  Surgeon: Adriel Boone MD;  Location: [facility] CATH LAB;  Service: Cardiology;  Laterality: Left;  630 admit for IV hydration  10am start    LUNG LOBECTOMY Right 1970s    per patient not cancer    right below knee amputation  2009 (approx)    SMALL INTESTINE SURGERY  2014    per patient partial @ time of aaa repair  not small bowel - large bowel bowel compromised bythtcintia AAAbowel    TONSILLECTOMY  1955 aprox    URETERAL STENT PLACEMENT Left     annually replaced since 2012 or so  Dr Jin       Review of patient's allergies indicates:   Allergen Reactions    Morphine Itching       No current facility-administered medications on file prior to  encounter.      Current Outpatient Medications on File Prior to Encounter   Medication Sig    albuterol-ipratropium (DUO-NEB) 2.5 mg-0.5 mg/3 mL nebulizer solution Take 3 mLs by nebulization every 8 (eight) hours as needed for Wheezing or Shortness of Breath. Rescue    amLODIPine (NORVASC) 10 MG tablet TAKE 1 TABLET EVERY DAY    aspirin (ECOTRIN) 81 MG EC tablet Take 1 tablet (81 mg total) by mouth once daily.    atorvastatin (LIPITOR) 40 MG tablet Take 1 tablet (40 mg total) by mouth once daily.    carvedilol (COREG) 25 MG tablet Take 1 tablet (25 mg total) by mouth 2 (two) times daily with meals.    cetirizine (ZYRTEC) 10 MG tablet Take 10 mg by mouth once daily.    folic acid-vit B6-vit B12 2.5-25-2 mg (FOLBIC OR EQUIV) 2.5-25-2 mg Tab Take 1 tablet by mouth once daily.    furosemide (LASIX) 20 MG tablet Take two tab on Mon/Wed/Friday and one tab on Tues/Thurs/ Sat/    hydrALAZINE (APRESOLINE) 25 MG tablet Take 1 tablet (25 mg total) by mouth every 12 (twelve) hours.    isosorbide mononitrate (IMDUR) 120 MG 24 hr tablet Take 1 tablet by mouth once daily.    mupirocin (BACTROBAN) 2 % ointment Apply topically 3 (three) times daily.    nitroglycerin (NITROSTAT) 0.6 MG Subl Place 1 tablet (0.6 mg total) under the tongue every 5 (five) minutes as needed (max 3/ per episode).    omeprazole (PRILOSEC) 20 MG capsule TAKE 1 CAPSULE TWICE DAILY    OXYGEN-AIR DELIVERY SYSTEMS MISC by [facility].(Non-Drug; Combo Route) route.    sertraline (ZOLOFT) 50 MG tablet Take 1 tablet (50 mg total) by mouth once daily.    triamcinolone acetonide 0.1% (KENALOG) 0.1 % cream Apply topically 2 (two) times daily.     Family History     Problem Relation (Age of Onset)    COPD Mother    Cancer Mother    Diabetes Daughter    Heart disease Father        Tobacco Use    Smoking status: Former Smoker     Packs/day: 1.00     Years: 15.00     Pack years: 15.00     Last attempt to quit: 2009     Years since quittin.4     Smokeless tobacco: Never Used   Substance and Sexual Activity    Alcohol use: No    Drug use: No     Comment: Is on prescription opiod, no non prescribed use    Sexual activity: Not Currently     Partners: Female     Review of Systems   Constitution: Negative for decreased appetite, diaphoresis, fever, malaise/fatigue and night sweats.   HENT: Negative for nosebleeds.    Eyes: Negative for blurred vision and double vision.   Cardiovascular: Positive for dyspnea on exertion. Negative for chest pain, claudication, irregular heartbeat, leg swelling, near-syncope, orthopnea, palpitations, paroxysmal nocturnal dyspnea and syncope.   Respiratory: Negative for cough, shortness of breath, sleep disturbances due to breathing, snoring, sputum production and wheezing.    Endocrine: Negative for cold intolerance and polyuria.   Hematologic/Lymphatic: Does not bruise/bleed easily.   Skin: Negative for rash.   Musculoskeletal: Negative for back pain, falls, joint pain, joint swelling and neck pain.   Gastrointestinal: Negative for abdominal pain, heartburn, nausea and vomiting.        LGIB+   Genitourinary: Negative for dysuria, frequency and hematuria.   Neurological: Negative for difficulty with concentration, dizziness, focal weakness, headaches, light-headedness, numbness, seizures and weakness.   Psychiatric/Behavioral: Negative for depression, memory loss and substance abuse. The patient does not have insomnia.    Allergic/Immunologic: Negative for HIV exposure and hives.     Objective:     Vital Signs (Most Recent):  Temp: 97.7 °F (36.5 °C) (06/09/20 2049)  Pulse: 83 (06/09/20 2049)  Resp: 18 (06/09/20 2049)  BP: (!) 150/68 (06/09/20 2049)  SpO2: 97 % (06/09/20 2049) Vital Signs (24h Range):  Temp:  [97.4 °F (36.3 °C)-98.4 °F (36.9 °C)] 97.7 °F (36.5 °C)  Pulse:  [68-92] 83  Resp:  [10-26] 18  SpO2:  [97 %-100 %] 97 %  BP: ()/(56-82) 150/68     Weight: 79.4 kg (175 lb)  Body mass index is 23.73 kg/m².    SpO2: 97  %  O2 Device (Oxygen Therapy): room air      Intake/Output Summary (Last 24 hours) at 6/9/2020 2121  Last data filed at 6/9/2020 2049  Gross per 24 hour   Intake 2202.5 ml   Output --   Net 2202.5 ml       Lines/Drains/Airways     Peripheral Intravenous Line                 Peripheral IV - Single Lumen 06/09/20 0910 18 G Right Forearm less than 1 day                Physical Exam   Constitutional: He is oriented to person, place, and time. He appears well-nourished.   HENT:   Head: Normocephalic.   Eyes: Pupils are equal, round, and reactive to light.   Neck: Normal carotid pulses and no JVD present. Carotid bruit is not present. No thyromegaly present.   Cardiovascular: Normal rate, regular rhythm, normal heart sounds and normal pulses.  No extrasystoles are present. PMI is not displaced. Exam reveals no gallop and no S3.   No murmur heard.  Pulmonary/Chest: Breath sounds normal. No stridor. No respiratory distress.   Abdominal: Soft. Bowel sounds are normal. There is no tenderness. There is no rebound.   Neurological: He is alert and oriented to person, place, and time.   Skin: Skin is intact. No rash noted.   Psychiatric: His behavior is normal.       Significant Labs:   ABG: No results for input(s): PH, PCO2, HCO3, POCSATURATED, BE in the last 48 hours., Blood Culture: No results for input(s): LABBLOO in the last 48 hours., BMP:   Recent Labs   Lab 06/09/20  0024   *      K 4.5      CO2 16*   BUN 44*   CREATININE 3.2*   CALCIUM 8.2*   , CMP   Recent Labs   Lab 06/09/20  0024      K 4.5      CO2 16*   *   BUN 44*   CREATININE 3.2*   CALCIUM 8.2*   PROT 7.4   ALBUMIN 3.3*   BILITOT 0.3   ALKPHOS 164*   AST 17   ALT 14   ANIONGAP 11   ESTGFRAFRICA 21*   EGFRNONAA 18*   , CBC   Recent Labs   Lab 06/09/20  0024 06/09/20  0918 06/09/20  1524   WBC 12.96* 8.68  --    HGB 7.9* 7.5* 7.9*   HCT 26.5* 24.4* 25.7*    237  --    , INR   Recent Labs   Lab 06/09/20  0024   INR 1.0    , Lipid Panel No results for input(s): CHOL, HDL, LDLCALC, TRIG, CHOLHDL in the last 48 hours. and Troponin   Recent Labs   Lab 06/09/20  1524   TROPONINI 0.016       Significant Imaging: EKG:

## 2020-06-11 ENCOUNTER — NURSE TRIAGE (OUTPATIENT)
Dept: ADMINISTRATIVE | Facility: CLINIC | Age: 70
End: 2020-06-11

## 2020-06-11 ENCOUNTER — ANESTHESIA (OUTPATIENT)
Dept: SURGERY | Facility: HOSPITAL | Age: 70
DRG: 329 | End: 2020-06-11
Payer: MEDICARE

## 2020-06-11 ENCOUNTER — DOCUMENTATION ONLY (OUTPATIENT)
Dept: HEMATOLOGY/ONCOLOGY | Facility: CLINIC | Age: 70
End: 2020-06-11

## 2020-06-11 LAB
ALBUMIN SERPL BCP-MCNC: 3.1 G/DL (ref 3.5–5.2)
ALP SERPL-CCNC: 121 U/L (ref 55–135)
ALT SERPL W/O P-5'-P-CCNC: 11 U/L (ref 10–44)
ANION GAP SERPL CALC-SCNC: 7 MMOL/L (ref 8–16)
AST SERPL-CCNC: 11 U/L (ref 10–40)
BASOPHILS # BLD AUTO: 0.03 K/UL (ref 0–0.2)
BASOPHILS NFR BLD: 0.5 % (ref 0–1.9)
BILIRUB SERPL-MCNC: 0.5 MG/DL (ref 0.1–1)
BUN SERPL-MCNC: 28 MG/DL (ref 8–23)
CALCIUM SERPL-MCNC: 8.1 MG/DL (ref 8.7–10.5)
CHLORIDE SERPL-SCNC: 111 MMOL/L (ref 95–110)
CO2 SERPL-SCNC: 21 MMOL/L (ref 23–29)
CREAT SERPL-MCNC: 2.1 MG/DL (ref 0.5–1.4)
DIFFERENTIAL METHOD: ABNORMAL
EOSINOPHIL # BLD AUTO: 0.3 K/UL (ref 0–0.5)
EOSINOPHIL NFR BLD: 3.8 % (ref 0–8)
ERYTHROCYTE [DISTWIDTH] IN BLOOD BY AUTOMATED COUNT: 17.9 % (ref 11.5–14.5)
EST. GFR  (AFRICAN AMERICAN): 36 ML/MIN/1.73 M^2
EST. GFR  (NON AFRICAN AMERICAN): 31 ML/MIN/1.73 M^2
GLUCOSE SERPL-MCNC: 91 MG/DL (ref 70–110)
HCT VFR BLD AUTO: 28.2 % (ref 40–54)
HGB BLD-MCNC: 8.9 G/DL (ref 14–18)
IMM GRANULOCYTES # BLD AUTO: 0.04 K/UL (ref 0–0.04)
IMM GRANULOCYTES NFR BLD AUTO: 0.6 % (ref 0–0.5)
LYMPHOCYTES # BLD AUTO: 1.3 K/UL (ref 1–4.8)
LYMPHOCYTES NFR BLD: 19.4 % (ref 18–48)
MCH RBC QN AUTO: 27.1 PG (ref 27–31)
MCHC RBC AUTO-ENTMCNC: 31.6 G/DL (ref 32–36)
MCV RBC AUTO: 86 FL (ref 82–98)
MONOCYTES # BLD AUTO: 0.7 K/UL (ref 0.3–1)
MONOCYTES NFR BLD: 9.9 % (ref 4–15)
NEUTROPHILS # BLD AUTO: 4.3 K/UL (ref 1.8–7.7)
NEUTROPHILS NFR BLD: 65.8 % (ref 38–73)
NRBC BLD-RTO: 0 /100 WBC
PLATELET # BLD AUTO: 154 K/UL (ref 150–350)
PMV BLD AUTO: 9.8 FL (ref 9.2–12.9)
POTASSIUM SERPL-SCNC: 4.2 MMOL/L (ref 3.5–5.1)
PROT SERPL-MCNC: 6.2 G/DL (ref 6–8.4)
RBC # BLD AUTO: 3.29 M/UL (ref 4.6–6.2)
SODIUM SERPL-SCNC: 139 MMOL/L (ref 136–145)
WBC # BLD AUTO: 6.54 K/UL (ref 3.9–12.7)

## 2020-06-11 PROCEDURE — 99232 PR SUBSEQUENT HOSPITAL CARE,LEVL II: ICD-10-PCS | Mod: 57,HCNC,, | Performed by: COLON & RECTAL SURGERY

## 2020-06-11 PROCEDURE — 25000003 PHARM REV CODE 250: Mod: HCNC | Performed by: COLON & RECTAL SURGERY

## 2020-06-11 PROCEDURE — 88309 TISSUE EXAM BY PATHOLOGIST: CPT | Mod: HCNC | Performed by: PATHOLOGY

## 2020-06-11 PROCEDURE — 25000003 PHARM REV CODE 250: Mod: HCNC | Performed by: NURSE ANESTHETIST, CERTIFIED REGISTERED

## 2020-06-11 PROCEDURE — 44141 PARTIAL REMOVAL OF COLON: CPT | Mod: 22,HCNC,, | Performed by: COLON & RECTAL SURGERY

## 2020-06-11 PROCEDURE — 88342 IMHCHEM/IMCYTCHM 1ST ANTB: CPT | Mod: 26,HCNC,, | Performed by: PATHOLOGY

## 2020-06-11 PROCEDURE — 88307 PR  SURG PATH,LEVEL V: ICD-10-PCS | Mod: 26,HCNC,, | Performed by: PATHOLOGY

## 2020-06-11 PROCEDURE — 36000709 HC OR TIME LEV III EA ADD 15 MIN: Mod: HCNC | Performed by: COLON & RECTAL SURGERY

## 2020-06-11 PROCEDURE — 44141 PR PART REMOVAL COLON W COLOSTOMY: ICD-10-PCS | Mod: 22,HCNC,, | Performed by: COLON & RECTAL SURGERY

## 2020-06-11 PROCEDURE — 88341 IMHCHEM/IMCYTCHM EA ADD ANTB: CPT | Mod: 26,HCNC,, | Performed by: PATHOLOGY

## 2020-06-11 PROCEDURE — 27201423 OPTIME MED/SURG SUP & DEVICES STERILE SUPPLY: Mod: HCNC | Performed by: COLON & RECTAL SURGERY

## 2020-06-11 PROCEDURE — 88309 TISSUE EXAM BY PATHOLOGIST: CPT | Mod: 26,HCNC,, | Performed by: PATHOLOGY

## 2020-06-11 PROCEDURE — C9290 INJ, BUPIVACAINE LIPOSOME: HCPCS | Mod: HCNC | Performed by: COLON & RECTAL SURGERY

## 2020-06-11 PROCEDURE — 37000009 HC ANESTHESIA EA ADD 15 MINS: Mod: HCNC | Performed by: COLON & RECTAL SURGERY

## 2020-06-11 PROCEDURE — 88309 PR  SURG PATH,LEVEL VI: ICD-10-PCS | Mod: 26,HCNC,, | Performed by: PATHOLOGY

## 2020-06-11 PROCEDURE — 88307 TISSUE EXAM BY PATHOLOGIST: CPT | Mod: 26,HCNC,, | Performed by: PATHOLOGY

## 2020-06-11 PROCEDURE — 88342 IMHCHEM/IMCYTCHM 1ST ANTB: CPT | Mod: HCNC | Performed by: PATHOLOGY

## 2020-06-11 PROCEDURE — 63600175 PHARM REV CODE 636 W HCPCS: Mod: HCNC | Performed by: COLON & RECTAL SURGERY

## 2020-06-11 PROCEDURE — 37000008 HC ANESTHESIA 1ST 15 MINUTES: Mod: HCNC | Performed by: COLON & RECTAL SURGERY

## 2020-06-11 PROCEDURE — 63600175 PHARM REV CODE 636 W HCPCS: Mod: HCNC | Performed by: INTERNAL MEDICINE

## 2020-06-11 PROCEDURE — 63600175 PHARM REV CODE 636 W HCPCS: Mod: HCNC | Performed by: NURSE ANESTHETIST, CERTIFIED REGISTERED

## 2020-06-11 PROCEDURE — 88342 CHG IMMUNOCYTOCHEMISTRY: ICD-10-PCS | Mod: 26,HCNC,, | Performed by: PATHOLOGY

## 2020-06-11 PROCEDURE — 99232 SBSQ HOSP IP/OBS MODERATE 35: CPT | Mod: 57,HCNC,, | Performed by: COLON & RECTAL SURGERY

## 2020-06-11 PROCEDURE — 36000708 HC OR TIME LEV III 1ST 15 MIN: Mod: HCNC | Performed by: COLON & RECTAL SURGERY

## 2020-06-11 PROCEDURE — 88341 PR IHC OR ICC EACH ADD'L SINGLE ANTIBODY  STAINPR: ICD-10-PCS | Mod: 26,HCNC,, | Performed by: PATHOLOGY

## 2020-06-11 PROCEDURE — 21400001 HC TELEMETRY ROOM: Mod: HCNC

## 2020-06-11 PROCEDURE — 63600175 PHARM REV CODE 636 W HCPCS: Mod: HCNC | Performed by: ANESTHESIOLOGY

## 2020-06-11 PROCEDURE — 80053 COMPREHEN METABOLIC PANEL: CPT | Mod: HCNC

## 2020-06-11 PROCEDURE — 85025 COMPLETE CBC W/AUTO DIFF WBC: CPT | Mod: HCNC

## 2020-06-11 PROCEDURE — 71000039 HC RECOVERY, EACH ADD'L HOUR: Mod: HCNC | Performed by: COLON & RECTAL SURGERY

## 2020-06-11 PROCEDURE — 99900035 HC TECH TIME PER 15 MIN (STAT): Mod: HCNC

## 2020-06-11 PROCEDURE — 36415 COLL VENOUS BLD VENIPUNCTURE: CPT | Mod: HCNC

## 2020-06-11 PROCEDURE — 94770 HC EXHALED C02 TEST: CPT | Mod: HCNC

## 2020-06-11 PROCEDURE — 25000003 PHARM REV CODE 250: Mod: HCNC | Performed by: NURSE PRACTITIONER

## 2020-06-11 PROCEDURE — C9113 INJ PANTOPRAZOLE SODIUM, VIA: HCPCS | Mod: HCNC | Performed by: COLON & RECTAL SURGERY

## 2020-06-11 PROCEDURE — P9016 RBC LEUKOCYTES REDUCED: HCPCS | Mod: HCNC

## 2020-06-11 PROCEDURE — 88307 TISSUE EXAM BY PATHOLOGIST: CPT | Mod: HCNC | Performed by: PATHOLOGY

## 2020-06-11 PROCEDURE — 88341 IMHCHEM/IMCYTCHM EA ADD ANTB: CPT | Mod: HCNC | Performed by: PATHOLOGY

## 2020-06-11 PROCEDURE — 71000033 HC RECOVERY, INTIAL HOUR: Mod: HCNC | Performed by: COLON & RECTAL SURGERY

## 2020-06-11 PROCEDURE — S0030 INJECTION, METRONIDAZOLE: HCPCS | Mod: HCNC | Performed by: COLON & RECTAL SURGERY

## 2020-06-11 RX ORDER — SODIUM CHLORIDE 9 MG/ML
INJECTION, SOLUTION INTRAVENOUS CONTINUOUS PRN
Status: DISCONTINUED | OUTPATIENT
Start: 2020-06-11 | End: 2020-06-11

## 2020-06-11 RX ORDER — FENTANYL CITRATE 50 UG/ML
INJECTION, SOLUTION INTRAMUSCULAR; INTRAVENOUS
Status: DISCONTINUED | OUTPATIENT
Start: 2020-06-11 | End: 2020-06-11

## 2020-06-11 RX ORDER — METRONIDAZOLE 500 MG/100ML
500 INJECTION, SOLUTION INTRAVENOUS ONCE
Status: COMPLETED | OUTPATIENT
Start: 2020-06-11 | End: 2020-06-11

## 2020-06-11 RX ORDER — LIDOCAINE HYDROCHLORIDE 10 MG/ML
INJECTION, SOLUTION EPIDURAL; INFILTRATION; INTRACAUDAL; PERINEURAL
Status: DISCONTINUED | OUTPATIENT
Start: 2020-06-11 | End: 2020-06-11

## 2020-06-11 RX ORDER — ROCURONIUM BROMIDE 10 MG/ML
INJECTION, SOLUTION INTRAVENOUS
Status: DISCONTINUED | OUTPATIENT
Start: 2020-06-11 | End: 2020-06-11

## 2020-06-11 RX ORDER — NEOSTIGMINE METHYLSULFATE 1 MG/ML
INJECTION, SOLUTION INTRAVENOUS
Status: DISCONTINUED | OUTPATIENT
Start: 2020-06-11 | End: 2020-06-11

## 2020-06-11 RX ORDER — CARVEDILOL 12.5 MG/1
25 TABLET ORAL 2 TIMES DAILY
Status: DISCONTINUED | OUTPATIENT
Start: 2020-06-12 | End: 2020-06-17 | Stop reason: HOSPADM

## 2020-06-11 RX ORDER — HYDROMORPHONE HYDROCHLORIDE 2 MG/ML
0.2 INJECTION, SOLUTION INTRAMUSCULAR; INTRAVENOUS; SUBCUTANEOUS EVERY 5 MIN PRN
Status: DISCONTINUED | OUTPATIENT
Start: 2020-06-11 | End: 2020-06-11 | Stop reason: HOSPADM

## 2020-06-11 RX ORDER — NALOXONE HCL 0.4 MG/ML
0.02 VIAL (ML) INJECTION
Status: DISCONTINUED | OUTPATIENT
Start: 2020-06-11 | End: 2020-06-17 | Stop reason: HOSPADM

## 2020-06-11 RX ORDER — ONDANSETRON 2 MG/ML
4 INJECTION INTRAMUSCULAR; INTRAVENOUS DAILY PRN
Status: DISCONTINUED | OUTPATIENT
Start: 2020-06-11 | End: 2020-06-11 | Stop reason: HOSPADM

## 2020-06-11 RX ORDER — SUCCINYLCHOLINE CHLORIDE 20 MG/ML
INJECTION INTRAMUSCULAR; INTRAVENOUS
Status: DISCONTINUED | OUTPATIENT
Start: 2020-06-11 | End: 2020-06-11

## 2020-06-11 RX ORDER — PROPOFOL 10 MG/ML
VIAL (ML) INTRAVENOUS
Status: DISCONTINUED | OUTPATIENT
Start: 2020-06-11 | End: 2020-06-11

## 2020-06-11 RX ORDER — SODIUM CHLORIDE, SODIUM LACTATE, POTASSIUM CHLORIDE, CALCIUM CHLORIDE 600; 310; 30; 20 MG/100ML; MG/100ML; MG/100ML; MG/100ML
INJECTION, SOLUTION INTRAVENOUS CONTINUOUS PRN
Status: DISCONTINUED | OUTPATIENT
Start: 2020-06-11 | End: 2020-06-11

## 2020-06-11 RX ORDER — BUPIVACAINE HYDROCHLORIDE 2.5 MG/ML
INJECTION, SOLUTION EPIDURAL; INFILTRATION; INTRACAUDAL
Status: DISCONTINUED | OUTPATIENT
Start: 2020-06-11 | End: 2020-06-11 | Stop reason: HOSPADM

## 2020-06-11 RX ORDER — GLYCOPYRROLATE 0.2 MG/ML
INJECTION INTRAMUSCULAR; INTRAVENOUS
Status: DISCONTINUED | OUTPATIENT
Start: 2020-06-11 | End: 2020-06-11

## 2020-06-11 RX ORDER — HYDROMORPHONE HCL IN 0.9% NACL 6 MG/30 ML
PATIENT CONTROLLED ANALGESIA SYRINGE INTRAVENOUS CONTINUOUS
Status: DISCONTINUED | OUTPATIENT
Start: 2020-06-11 | End: 2020-06-13

## 2020-06-11 RX ORDER — ISOSORBIDE MONONITRATE 60 MG/1
120 TABLET, EXTENDED RELEASE ORAL DAILY
Status: DISCONTINUED | OUTPATIENT
Start: 2020-06-11 | End: 2020-06-17 | Stop reason: HOSPADM

## 2020-06-11 RX ORDER — HYDRALAZINE HYDROCHLORIDE 20 MG/ML
10 INJECTION INTRAMUSCULAR; INTRAVENOUS EVERY 8 HOURS PRN
Status: DISCONTINUED | OUTPATIENT
Start: 2020-06-11 | End: 2020-06-17 | Stop reason: HOSPADM

## 2020-06-11 RX ORDER — SODIUM CHLORIDE, SODIUM LACTATE, POTASSIUM CHLORIDE, CALCIUM CHLORIDE 600; 310; 30; 20 MG/100ML; MG/100ML; MG/100ML; MG/100ML
INJECTION, SOLUTION INTRAVENOUS CONTINUOUS
Status: DISCONTINUED | OUTPATIENT
Start: 2020-06-11 | End: 2020-06-13

## 2020-06-11 RX ORDER — HYDROCODONE BITARTRATE AND ACETAMINOPHEN 500; 5 MG/1; MG/1
TABLET ORAL
Status: DISCONTINUED | OUTPATIENT
Start: 2020-06-11 | End: 2020-06-17 | Stop reason: HOSPADM

## 2020-06-11 RX ORDER — HYDROCODONE BITARTRATE AND ACETAMINOPHEN 500; 5 MG/1; MG/1
TABLET ORAL
Status: DISCONTINUED | OUTPATIENT
Start: 2020-06-11 | End: 2020-06-11 | Stop reason: HOSPADM

## 2020-06-11 RX ADMIN — PROPOFOL 50 MG: 10 INJECTION, EMULSION INTRAVENOUS at 12:06

## 2020-06-11 RX ADMIN — NEOSTIGMINE METHYLSULFATE 5 MG: 1 INJECTION INTRAVENOUS at 12:06

## 2020-06-11 RX ADMIN — CARVEDILOL 12.5 MG: 12.5 TABLET, FILM COATED ORAL at 03:06

## 2020-06-11 RX ADMIN — PROPOFOL 120 MG: 10 INJECTION, EMULSION INTRAVENOUS at 10:06

## 2020-06-11 RX ADMIN — Medication: at 01:06

## 2020-06-11 RX ADMIN — ATORVASTATIN CALCIUM 40 MG: 40 TABLET, FILM COATED ORAL at 03:06

## 2020-06-11 RX ADMIN — METRONIDAZOLE 500 MG: 500 SOLUTION INTRAVENOUS at 10:06

## 2020-06-11 RX ADMIN — FENTANYL CITRATE 100 MCG: 50 INJECTION, SOLUTION INTRAMUSCULAR; INTRAVENOUS at 09:06

## 2020-06-11 RX ADMIN — ISOSORBIDE MONONITRATE 120 MG: 60 TABLET, EXTENDED RELEASE ORAL at 09:06

## 2020-06-11 RX ADMIN — Medication: at 07:06

## 2020-06-11 RX ADMIN — ROBINUL 0.8 MG: 0.2 INJECTION INTRAMUSCULAR; INTRAVENOUS at 12:06

## 2020-06-11 RX ADMIN — SODIUM CHLORIDE, SODIUM LACTATE, POTASSIUM CHLORIDE, AND CALCIUM CHLORIDE: .6; .31; .03; .02 INJECTION, SOLUTION INTRAVENOUS at 01:06

## 2020-06-11 RX ADMIN — FENTANYL CITRATE 50 MCG: 50 INJECTION, SOLUTION INTRAMUSCULAR; INTRAVENOUS at 12:06

## 2020-06-11 RX ADMIN — BUPIVACAINE 266 MG: 13.3 INJECTION, SUSPENSION, LIPOSOMAL INFILTRATION at 10:06

## 2020-06-11 RX ADMIN — ROCURONIUM BROMIDE 5 MG: 10 INJECTION, SOLUTION INTRAVENOUS at 10:06

## 2020-06-11 RX ADMIN — ROCURONIUM BROMIDE 10 MG: 10 INJECTION, SOLUTION INTRAVENOUS at 10:06

## 2020-06-11 RX ADMIN — SODIUM CHLORIDE, SODIUM LACTATE, POTASSIUM CHLORIDE, AND CALCIUM CHLORIDE: 600; 310; 30; 20 INJECTION, SOLUTION INTRAVENOUS at 09:06

## 2020-06-11 RX ADMIN — LIDOCAINE HYDROCHLORIDE 50 MG: 10 INJECTION, SOLUTION EPIDURAL; INFILTRATION; INTRACAUDAL; PERINEURAL at 10:06

## 2020-06-11 RX ADMIN — ROCURONIUM BROMIDE 25 MG: 10 INJECTION, SOLUTION INTRAVENOUS at 10:06

## 2020-06-11 RX ADMIN — CARVEDILOL 12.5 MG: 12.5 TABLET, FILM COATED ORAL at 08:06

## 2020-06-11 RX ADMIN — ROCURONIUM BROMIDE 10 MG: 10 INJECTION, SOLUTION INTRAVENOUS at 11:06

## 2020-06-11 RX ADMIN — FENTANYL CITRATE 25 MCG: 50 INJECTION, SOLUTION INTRAMUSCULAR; INTRAVENOUS at 11:06

## 2020-06-11 RX ADMIN — HYDROMORPHONE HYDROCHLORIDE 0.2 MG: 2 INJECTION, SOLUTION INTRAMUSCULAR; INTRAVENOUS; SUBCUTANEOUS at 01:06

## 2020-06-11 RX ADMIN — CEFTRIAXONE SODIUM 2 G: 2 INJECTION, SOLUTION INTRAVENOUS at 10:06

## 2020-06-11 RX ADMIN — SUCCINYLCHOLINE CHLORIDE 120 MG: 20 INJECTION, SOLUTION INTRAMUSCULAR; INTRAVENOUS at 10:06

## 2020-06-11 RX ADMIN — PANTOPRAZOLE SODIUM 40 MG: 40 INJECTION, POWDER, LYOPHILIZED, FOR SOLUTION INTRAVENOUS at 03:06

## 2020-06-11 RX ADMIN — SODIUM CHLORIDE: 0.9 INJECTION, SOLUTION INTRAVENOUS at 10:06

## 2020-06-11 NOTE — OP NOTE
[facility]  Surgery Department  Operative Note    SUMMARY     Date of Procedure: 6/11/2020     Procedure:  1. Open partial colectomy (transverse) with end proximal transverse colostomy construction  2. Extensive lysis of adhesions  3. Partial omentectomy  4. Transversus abdominis plane block    Surgeon(s) and Role:     * Leonardo Yang MD - Primary    Assisting Surgeon: None    Pre-Operative Diagnosis: Acute lower GI bleeding [K92.2]  Colon adenocarcinoma [C18.9]    Post-Operative Diagnosis: Post-Op Diagnosis Codes:     * Acute lower GI bleeding [K92.2]     * Colon adenocarcinoma [C18.9]    Anesthesia: General    Technical Procedures Used:   1. Open partial colectomy (transverse) with end proximal transverse colostomy construction  2. Extensive lysis of adhesions  3. Partial omentectomy  4. Transversus abdominis plane block    Indications for Procedure:  70-year-old male with known colonic adenocarcinoma with recent GI bleed with multiple medical comorbidities who presents for definitive surgical management    Findings of the Procedure:  Mid transverse colon mass and tattoo with significant adhesions between the omentum and small, omentum and colon small bowel and colon, and small bowel to small, along with adhesions of the omentum and small bowel to the anterior abdominal    Description of the Procedure:  Patient was brought the operating room placed supine on table.  General tracheal anesthesia was then induced.  Loza catheter was then sterilely placed.  The abdomen was then prepped and draped in usual sterile fashion.  Preoperative surgical time-out was then performed confirming the correct patient, procedure and preop medications given.  A midline laparotomy was then performed through his previous incision above the umbilicus using a 10 blade with dissection carried down to the level of the fascia which was sharply incised.  The abdomen was then entered sharply without injury to underlying  structures.  There were immediate findings of adhesions of the omentum and small intestine to the anterior abdominal wall.  The incision was then lengthened both inferiorly and superiorly with an extensive lysis of adhesion undertaken.  The lysis of adhesion was greater than 1 hour and included adhesions between the omentum and anterior abdominal wall, omentum and small intestine, the omentum and colon, the small bowel and the anterior abdominal wall, the small bowel to the colon, and the small bowel to other loops of small bowel.      Once all adhesions had been lysed, a wound protector was placed.  The transverse colon was evaluated and found to contain the tattoo of the midportion of the transverse colon with a palpable mass just proximal to it.  There was no evidence of metastatic disease either in the liver or within the rest of the abdominal cavity.  The omentum was then mobilized off of the colon and the lesser sac was entered to fully mobilize the transverse colon.  Once the sac completed, a 0.5 cm distal to the transverse colon mass was chosen for point of transection.  Mesenteric window was made beneath this location a NIXON 75 blue load was used to transect the transverse colon at its distal location just before the splenic flexure and the staple line was then oversewn with a 2-0 Vicryl suture.  A point of transection was then chosen 5 cm proximal to the mass on the proximal transverse colon and a mesenteric window was made beneath this location an additional load of the NIXON 75 blue load was used to transect the colon at this location.  Once the mesentery was then free of all adhesions, the mesentery was then taken between the transected segments with the EnSeal device.  The specimen was then passed off the field final pathology.  The omentum was in essence divided in half at this point due to all of his adhesions and previous mobilizations from previous surgeries.  The tongue of omentum that was at the  lower right side of the abdomen did have some tattoo associated with that as well some nodularity and therefore was transected towards the left side of the abdomen using the EnSeal device for partial omentectomy to sent for final pathology in case there was additional tumor in this location.      The proximal transverse colon was then evaluated and the hepatic flexure was then fully taken down lyse any adhesions to the retroperitoneal structures as well as to the right lateral sidewall in usual fashion.  Once the hepatic flexure had been fully mobilized, the ascending colon and the proximal transverse colon were very free and would reach easily for a right upper quadrant colostomy at the previously marked spot.  The abdomen was then copiously irrigated found to be hemostatic.  The wound protector was removed.  The premarked right upper quadrant ostomy site was then evaluated and a circular skin room was taken.  Dissection was carried down to level of the fascia which was sharply incised a longitudinal fashion, the rectus muscle was split and the posterior fascia and peritoneum were then incised using electrocautery.  2 finger breaths were easily traversed this defect was created and a Miley was used to pull the staple line from the proximal transverse colon through the ostomy for future maturation.  A transverse abdominis plane block was then performed using a mixture of 20 cc of Exparel, 30 cc of 0.25% bupivacaine plain and 10 cc of injectable saline.  30 cc injected into each side of the transverse abdominis plain for total of 60 cc given for the block under direct visualization.  The wound was found to be completely hemostatic as was the abdomen.  The fascia was then closed with #1 looped PDS suture.  The subcutaneous tissue was then copiously irrigated and the skin was closed with a skin stapler.      Attention was then turned to the right upper quadrant ostomy.  The staple line was sharply excised using  electrocautery.  For Brooking sutures were then placed in usual fashion with 3-0 Vicryl suture.  Additional interrupted 3-0 Vicryl sutures were used to complete the mucocutaneous junction in all 4 quadrants of the ostomy.  I somewhat appliance was then applied.  Surgical dressings were applied.  The patient was then awoken from general endotracheal anesthesia and taken to the postanesthesia care in stable condition.  He tolerated procedure well.  All sponge, needle and instrument counts were correct at the end of the case.    Significant Surgical Tasks Conducted by the Assistant(s), if Applicable: N/A    Complications: No    Estimated Blood Loss (EBL): 50 mL           Implants: * No implants in log *    Specimens:   Specimen (12h ago, onward)    None                  Condition: Good    Disposition: PACU - hemodynamically stable.    Attestation: I performed the procedure.

## 2020-06-11 NOTE — PLAN OF CARE
Pt resting on bed s/p exp lap, hemicolectomy, colostomy RUQ, TAP block performed under general anesthesia by Dr. Yang. Respirations even and unlabored on room air and tolerating well with O2 sats of 96%. VSS. Will cont to monitor.

## 2020-06-11 NOTE — SUBJECTIVE & OBJECTIVE
Interval History: Stable overnight. Ready for OR today.    Medications:  Continuous Infusions:  Scheduled Meds:   atorvastatin  40 mg Oral Daily    carvediloL  12.5 mg Oral BID    pantoprazole  40 mg Intravenous Daily     PRN Meds:sodium chloride, sodium chloride, sodium chloride, sodium chloride, sodium chloride, acetaminophen, albuterol-ipratropium, diphenhydrAMINE, HYDROcodone-acetaminophen, ondansetron, promethazine (PHENERGAN) IVPB     Review of patient's allergies indicates:   Allergen Reactions    Morphine Itching     Objective:     Vital Signs (Most Recent):  Temp: 98.2 °F (36.8 °C) (06/11/20 0712)  Pulse: 70 (06/11/20 0712)  Resp: 16 (06/11/20 0712)  BP: (!) 155/69 (06/11/20 0712)  SpO2: 96 % (06/11/20 0712) Vital Signs (24h Range):  Temp:  [97.4 °F (36.3 °C)-98.5 °F (36.9 °C)] 98.2 °F (36.8 °C)  Pulse:  [62-81] 70  Resp:  [16-18] 16  SpO2:  [95 %-100 %] 96 %  BP: (130-167)/(58-74) 155/69     Weight: 79.4 kg (175 lb)  Body mass index is 23.73 kg/m².    Intake/Output - Last 3 Shifts       06/09 0700 - 06/10 0659 06/10 0700 - 06/11 0659 06/11 0700 - 06/12 0659    P.O. 0 840     Blood 1202.5 729.2     IV Piggyback 1000      Total Intake(mL/kg) 2202.5 (27.7) 1569.2 (19.8)     Urine (mL/kg/hr) 500 (0.3) 800 (0.4) 450 (5.3)    Total Output 500 800 450    Net +1702.5 +769.2 -450           Urine Occurrence  1 x     Stool Occurrence 4 x 2 x           Physical Exam   Constitutional: He is oriented to person, place, and time. He appears well-developed.   HENT:   Head: Normocephalic and atraumatic.   Eyes: Conjunctivae and EOM are normal.   Neck: Normal range of motion. No thyromegaly present.   Cardiovascular: Normal rate and regular rhythm.   Pulmonary/Chest: Effort normal. No respiratory distress.   Abdominal:   Soft, nontender, nondistended, well-healed previous midline incision   Musculoskeletal: He exhibits no edema or tenderness.   R foot s/p amputation   Neurological: He is alert and oriented to person,  place, and time.   Skin: Skin is warm and dry. Capillary refill takes less than 2 seconds. No rash noted.   Psychiatric: He has a normal mood and affect.       Significant Labs:  CBC:   Recent Labs   Lab 06/11/20  0533   WBC 6.54   RBC 3.29*   HGB 8.9*   HCT 28.2*      MCV 86   MCH 27.1   MCHC 31.6*     BMP:   Recent Labs   Lab 06/11/20  0533   GLU 91      K 4.2   *   CO2 21*   BUN 28*   CREATININE 2.1*   CALCIUM 8.1*     CMP:   Recent Labs   Lab 06/11/20  0533   GLU 91   CALCIUM 8.1*   ALBUMIN 3.1*   PROT 6.2      K 4.2   CO2 21*   *   BUN 28*   CREATININE 2.1*   ALKPHOS 121   ALT 11   AST 11   BILITOT 0.5

## 2020-06-11 NOTE — TELEPHONE ENCOUNTER
Patient scheduled to be contacted today on behalf of the Post Procedural Symptom Tracker. Patient admitted at this time. Per protocol, no additional contact required at this time.      Reason for Disposition   Patient already left for the hospital/clinic    Additional Information   Negative: Caller has already spoken with the PCP (or office), and has no further questions   Negative: Caller has already spoken with another triager and has no further questions   Negative: Caller has already spoken with another triager or PCP (or office), and has further questions and triager able to answer questions.   Negative: Wrong number reached. Phone number verified.   Negative: Message left with person in household   Negative: Message left on identified voicemail   Negative: Busy signal.  First attempt to contact caller.  Follow-up call scheduled within 15 minutes.   Negative: No answer.  First attempt to contact caller.  Follow-up call scheduled within 15 minutes.   Negative: Message left on unidentified voice mail. Phone number verified.   Negative: Second attempt to contact family AND no contact made. Phone number verified.   Negative: Cell phone out of range. Phone number verified.    Protocols used: NO CONTACT OR DUPLICATE CONTACT CALL-A-OH

## 2020-06-11 NOTE — TRANSFER OF CARE
Anesthesia Transfer of Care Note    Patient: Kodi Ribeiro    Procedure(s) Performed: Procedure(s) (LRB):  COLECTOMY, PARTIAL Open with end colostomy construction (N/A)  BLOCK, TRANSVERSUS ABDOMINIS PLANE (N/A)  LYSIS, ADHESIONS (N/A)  CREATION, COLOSTOMY (N/A)  OMENTECTOMY (N/A)    Patient location: PACU    Anesthesia Type: general    Transport from OR: Transported from OR on room air with adequate spontaneous ventilation    Post pain: adequate analgesia    Post assessment: no apparent anesthetic complications and tolerated procedure well    Post vital signs: stable    Level of consciousness: responds to stimulation    Nausea/Vomiting: no nausea/vomiting    Complications: none    Transfer of care protocol was followed      Last vitals:   Visit Vitals  BP (!) 155/69   Pulse 62   Temp 36.8 °C (98.2 °F)   Resp 16   Ht 6' (1.829 m)   Wt 79.4 kg (175 lb)   SpO2 96%   BMI 23.73 kg/m²

## 2020-06-11 NOTE — PLAN OF CARE
Problem: Adult Inpatient Plan of Care  Goal: Plan of Care Review  6/11/2020 0145 by Rosey Valdez RN  Flowsheets (Taken 6/11/2020 0145)  Plan of Care Reviewed With: patient  Pt had no adverse events during shift. Pt free from falls. Call light in reach. SR's x2. Pt repositions independently. 2 units PRBC administered per orders. Tele monitor (NSR - 60's). Bowel prep completed. NPO after MN for surgery. VSS. Chart reviewed. Will continue to monitor.

## 2020-06-11 NOTE — PLAN OF CARE
Patient A&Ox4. Returned from PACU around 1500.  Room air.  NSR on tele.  NPO except sips of meds.  NG tube to left nare. Low cont suction.   RUQ colostomy.  Midline incision.   IV fluids infusing.  Dilaudid PCA in use.  Wife at bedside.  All questions answered.  Will continue to monitor.    Angelica Mendez RN

## 2020-06-11 NOTE — PROGRESS NOTES
[facility]  Colorectal Surgery  Progress Note    Subjective:     History of Present Illness:  70 y.o. male  well known to me who is admitted to the hospital with a LGIB. Pt has known colonic adenocarcinoma which is likely his source of bleeding, as he had colonoscopy last week showing other polyps as well as known carcinoma. Pt has a PMHx significant for anemia, GERD, HTN, HLD, CKD, CHF, CVA, CAD and PVD with previous AAA repair who recently underwent a colonoscopy on 06/02/2020 where a malignant-appearing mass was seen in the transverse colon, possible descending colon and biopsied.  Biopsies did confirm this to be adenocarcinoma.  Patient states that he had been having both hematochezia and melena since January intermittently. Patient's last colonoscopy prior to this 1 was in February 2014.  Patient has significant past surgical history including multiple previous abdominal operations including AAA repair, AAA graft excision and right ax fem bypass with small-bowel resection.  He denies current fever, chills, nausea, vomiting.  Patient reports a significant past coronary history requiring stent placement and likely need for further stent/CABG but is unable to undergo at this time due to high risk nature and has chosen medical therapy. Surgery consulted for evaluation of colonic adenoCA with bleeding.    Post-Op Info:  Procedure(s) (LRB):  COLECTOMY, PARTIAL Open with end colostomy construction (N/A)   Day of Surgery     Interval History: Stable overnight. Ready for OR today.    Medications:  Continuous Infusions:  Scheduled Meds:   atorvastatin  40 mg Oral Daily    carvediloL  12.5 mg Oral BID    pantoprazole  40 mg Intravenous Daily     PRN Meds:sodium chloride, sodium chloride, sodium chloride, sodium chloride, sodium chloride, acetaminophen, albuterol-ipratropium, diphenhydrAMINE, HYDROcodone-acetaminophen, ondansetron, promethazine (PHENERGAN) IVPB     Review of patient's allergies  indicates:   Allergen Reactions    Morphine Itching     Objective:     Vital Signs (Most Recent):  Temp: 98.2 °F (36.8 °C) (06/11/20 0712)  Pulse: 70 (06/11/20 0712)  Resp: 16 (06/11/20 0712)  BP: (!) 155/69 (06/11/20 0712)  SpO2: 96 % (06/11/20 0712) Vital Signs (24h Range):  Temp:  [97.4 °F (36.3 °C)-98.5 °F (36.9 °C)] 98.2 °F (36.8 °C)  Pulse:  [62-81] 70  Resp:  [16-18] 16  SpO2:  [95 %-100 %] 96 %  BP: (130-167)/(58-74) 155/69     Weight: 79.4 kg (175 lb)  Body mass index is 23.73 kg/m².    Intake/Output - Last 3 Shifts       06/09 0700 - 06/10 0659 06/10 0700 - 06/11 0659 06/11 0700 - 06/12 0659    P.O. 0 840     Blood 1202.5 729.2     IV Piggyback 1000      Total Intake(mL/kg) 2202.5 (27.7) 1569.2 (19.8)     Urine (mL/kg/hr) 500 (0.3) 800 (0.4) 450 (5.3)    Total Output 500 800 450    Net +1702.5 +769.2 -450           Urine Occurrence  1 x     Stool Occurrence 4 x 2 x           Physical Exam   Constitutional: He is oriented to person, place, and time. He appears well-developed.   HENT:   Head: Normocephalic and atraumatic.   Eyes: Conjunctivae and EOM are normal.   Neck: Normal range of motion. No thyromegaly present.   Cardiovascular: Normal rate and regular rhythm.   Pulmonary/Chest: Effort normal. No respiratory distress.   Abdominal:   Soft, nontender, nondistended, well-healed previous midline incision   Musculoskeletal: He exhibits no edema or tenderness.   R foot s/p amputation   Neurological: He is alert and oriented to person, place, and time.   Skin: Skin is warm and dry. Capillary refill takes less than 2 seconds. No rash noted.   Psychiatric: He has a normal mood and affect.       Significant Labs:  CBC:   Recent Labs   Lab 06/11/20  0533   WBC 6.54   RBC 3.29*   HGB 8.9*   HCT 28.2*      MCV 86   MCH 27.1   MCHC 31.6*     BMP:   Recent Labs   Lab 06/11/20  0533   GLU 91      K 4.2   *   CO2 21*   BUN 28*   CREATININE 2.1*   CALCIUM 8.1*     CMP:   Recent Labs   Lab  06/11/20  0533   GLU 91   CALCIUM 8.1*   ALBUMIN 3.1*   PROT 6.2      K 4.2   CO2 21*   *   BUN 28*   CREATININE 2.1*   ALKPHOS 121   ALT 11   AST 11   BILITOT 0.5     Assessment/Plan:     * Acute lower GI bleeding  See plan for colon adenocarcinoma    Colon adenocarcinoma  69yo M with multiple medical comorbidities who presents with transverse vs descending colonic adenocarcinoma with possible metastatic lung disease with acute lower GI bleed, recurrent    - Long discussion with patient after multidisciplinary discussion with cards, hospital medicine, CRS and gastroenterology about the need for surgery. Discussed that given the inability to anticoagulate the patient, he will need resection of this mass to enable him to get back to anticoagulation sooner to lessen his long term cardiac risk, as well as possibly open up further cardiac procedures as necessary. Discussed we will plan for open colectomy, lysis of adhesions and end colostomy to reduce the risk postop and avoid any potential with anastomotic leak  - Discussed he is a very high risk surgical candidate given his multiple medical comorbidities, including periop cardiac event, CVA, pulmonary event and even death. Pt and wife understood and are agreeable to proceed  - OR today. All risks, benefits and alternatives fully explained to patient. Risks include, but are not limited to, bleeding, infection, anastomotic leak, damage to ureter, damage to other intra-abdominal organs such as colon, rectum, small bowel, stomach, liver, bladder, reproductive organs, sexual dysfunction, urinary dysfunction, postoperative abscess, perioperative MI, CVA and death.  All questions field and appropriately answered to patient's satisfaction.  Consent signed and placed on chart.  - preop surgical stoma marking by ostomy nurses    COPD (chronic obstructive pulmonary disease)  Management per primary team    Coronary artery disease involving native coronary artery of  native heart without angina pectoris  Management per primary team    Acute on chronic kidney failure  Management per primary team    PVD (peripheral vascular disease)  Management per primary team    Acute blood loss anemia  Management per primary team    Essential hypertension  Management per primary team    Hyperlipidemia  Management per primary team        Leonardo Yang MD  Colorectal Surgery  [facility]

## 2020-06-11 NOTE — ASSESSMENT & PLAN NOTE
69yo M with multiple medical comorbidities who presents with transverse vs descending colonic adenocarcinoma with possible metastatic lung disease with acute lower GI bleed, recurrent    - Long discussion with patient after multidisciplinary discussion with cards, hospital medicine, CRS and gastroenterology about the need for surgery. Discussed that given the inability to anticoagulate the patient, he will need resection of this mass to enable him to get back to anticoagulation sooner to lessen his long term cardiac risk, as well as possibly open up further cardiac procedures as necessary. Discussed we will plan for open colectomy, lysis of adhesions and end colostomy to reduce the risk postop and avoid any potential with anastomotic leak  - Discussed he is a very high risk surgical candidate given his multiple medical comorbidities, including periop cardiac event, CVA, pulmonary event and even death. Pt and wife understood and are agreeable to proceed  - OR today. All risks, benefits and alternatives fully explained to patient. Risks include, but are not limited to, bleeding, infection, anastomotic leak, damage to ureter, damage to other intra-abdominal organs such as colon, rectum, small bowel, stomach, liver, bladder, reproductive organs, sexual dysfunction, urinary dysfunction, postoperative abscess, perioperative MI, CVA and death.  All questions field and appropriately answered to patient's satisfaction.  Consent signed and placed on chart.  - preop surgical stoma marking by ostomy nurses

## 2020-06-11 NOTE — NURSING
Sent secure chat to clarify blood transfusion orders with surgery. Per Dr. Mcgrath (on call surgery) pt needs to receive 2uPRBC tonight ASAP and 2 units prepared for surgery tomorrow. Sent secure chat to \[facility]\"" to order 2 additional units to prep and notified blood bank and TERESITA Marroquin

## 2020-06-11 NOTE — PHYSICIAN QUERY
"PT Name: Kodi Ribeiro  MR #: [id number]    Physician Query Form - Heart  Condition Clarification     CDS Giulia Guardado RN, BSN        Contact Information:    [email or web address]         This form is a permanent document in the medical record.     Query Date: June 11, 2020    By submitting this query, we are merely seeking further clarification of documentation. Please utilize your independent clinical judgment when addressing the question(s) below.    The medical record contains the following   Indicators     Supporting Clinical Findings Location in Medical Record      BNP     X   EF The estimated ejection fraction is 60%. 6/9 TTE    Radiology findings     X   Echo Results · Concentric left ventricular remodeling.  · Normal left ventricular systolic function. The estimated ejection fraction is 60%.  · Normal LV diastolic function.      6/9 TTE     "Ascites" documented     X   "SOB" or "MACKEY" documented Chronic MACKEY mild and stable  No active angina, active arrhythmia and CHF symptoms 6/9 Cardiology consult     "Hypoxia" documented     X   Heart Failure documented PMHx of acute on chronic congestive heart failure, acute respiratory failure with hypoxia, COPD exacerbation, CAD, diverticulosis, GERD, hyperglycemia, HLD, HTN,  and MI 6/8 H&P    "Edema" documented     X   Diuretics/Meds carvediloL tablet 12.5 mg    Route: Oral Frequency: 2 times daily     Scheduled Start Date/Time: 06/10/20 1215 End Date/Time: --     amLODIPine (NORVASC) 10 MG tablet TAKE 1 TABLET EVERY DAY  aspirin (ECOTRIN) 81 MG EC tablet Take 1 tablet (81 mg total) by mouth once daily.  atorvastatin (LIPITOR) 40 MG tablet Take 1 tablet (40 mg total) by mouth once daily.   carvedilol (COREG) 25 MG tablet Take 1 tablet (25 mg total) by mouth 2 (two) times   isosorbide mononitrate (IMDUR) 120 MG 24 hr table     Take 1 tablet by mouth once daily.  hydrALAZINE (APRESOLINE) 25 MG tablet Take 1 tablet (25 mg total) by mouth every 12 (twelve) " hours.  furosemide (LASIX) 20 MG tablet Take two tab on Mon/Wed/Friday and one tab on Tues/Thurs/ Sat/Bony   MAR      H&P   Current Outpatient Medications     Treatment:     X   Other:  Acute lower GI bleeding  -in the setting of adenocarcinoma  6/8 H&P   Heart failure (HF) can be acute, chronic or both. It is generally further specificed as systolic, diastolic, or combined. Lastly, it is important to identify an underlying etiology if known or suspected.     Common clues to acute exacerbation:  Rapidly progressive symptoms (w/in 2 weeks of presentation), using IV diuretics to treat, using supplemental O2, pulmonary edema on Xray, MI w/in 4 weeks, and/or BNP >500    Systolic Heart Failure: is defined as chart documentation of a left ventricular ejection fraction (LVEF) less than 40%     Diastolic Heart Failure: is defined as a left ventricular ejection fraction (LVEF) greater than 40%   +      Evidence of diastolic dysfunction on echocardiography OR    Right heart catheterization wedge pressure above 12 mm Hg OR    Left heart catheterization left ventricular end diastolic pressure 18 mm Hg or above.    References: *American Heart Association    The clinical guidelines noted below are only system guidelines, and do not replace the providers clinical judgment.     Provider, please specify the diagnosis associated with above clinical findings      [   ] Chronic Diastolic Heart Failure - Pre-existing diastolic HF diagnosis.  EF > 40%  without  acute HF symptoms documented   [   ] Other (please specify):   [ x ] Clinically Undetermined                           Please document in your progress notes daily for the duration of treatment until resolved and include in your discharge summary.

## 2020-06-11 NOTE — PROGRESS NOTES
Nurse called pt to set up IV Iron, pt's wife updated nurse he is having colon cancer surgery today.

## 2020-06-12 LAB
ABO + RH BLD: NORMAL
ALBUMIN SERPL BCP-MCNC: 3 G/DL (ref 3.5–5.2)
ALP SERPL-CCNC: 129 U/L (ref 55–135)
ALT SERPL W/O P-5'-P-CCNC: 13 U/L (ref 10–44)
ANION GAP SERPL CALC-SCNC: 8 MMOL/L (ref 8–16)
AST SERPL-CCNC: 14 U/L (ref 10–40)
BASOPHILS # BLD AUTO: 0.02 K/UL (ref 0–0.2)
BASOPHILS NFR BLD: 0.3 % (ref 0–1.9)
BILIRUB SERPL-MCNC: 0.6 MG/DL (ref 0.1–1)
BLD GP AB SCN CELLS X3 SERPL QL: NORMAL
BUN SERPL-MCNC: 18 MG/DL (ref 8–23)
CALCIUM SERPL-MCNC: 8.1 MG/DL (ref 8.7–10.5)
CHLORIDE SERPL-SCNC: 109 MMOL/L (ref 95–110)
CO2 SERPL-SCNC: 23 MMOL/L (ref 23–29)
CREAT SERPL-MCNC: 1.8 MG/DL (ref 0.5–1.4)
DIFFERENTIAL METHOD: ABNORMAL
EOSINOPHIL # BLD AUTO: 0.2 K/UL (ref 0–0.5)
EOSINOPHIL NFR BLD: 2.9 % (ref 0–8)
ERYTHROCYTE [DISTWIDTH] IN BLOOD BY AUTOMATED COUNT: 17.6 % (ref 11.5–14.5)
EST. GFR  (AFRICAN AMERICAN): 43 ML/MIN/1.73 M^2
EST. GFR  (NON AFRICAN AMERICAN): 37 ML/MIN/1.73 M^2
GLUCOSE SERPL-MCNC: 98 MG/DL (ref 70–110)
HCT VFR BLD AUTO: 31.9 % (ref 40–54)
HGB BLD-MCNC: 10 G/DL (ref 14–18)
IMM GRANULOCYTES # BLD AUTO: 0.03 K/UL (ref 0–0.04)
IMM GRANULOCYTES NFR BLD AUTO: 0.4 % (ref 0–0.5)
LYMPHOCYTES # BLD AUTO: 0.7 K/UL (ref 1–4.8)
LYMPHOCYTES NFR BLD: 9.7 % (ref 18–48)
MAGNESIUM SERPL-MCNC: 2.3 MG/DL (ref 1.6–2.6)
MCH RBC QN AUTO: 27.4 PG (ref 27–31)
MCHC RBC AUTO-ENTMCNC: 31.3 G/DL (ref 32–36)
MCV RBC AUTO: 87 FL (ref 82–98)
MONOCYTES # BLD AUTO: 0.8 K/UL (ref 0.3–1)
MONOCYTES NFR BLD: 10.5 % (ref 4–15)
NEUTROPHILS # BLD AUTO: 5.8 K/UL (ref 1.8–7.7)
NEUTROPHILS NFR BLD: 76.2 % (ref 38–73)
NRBC BLD-RTO: 0 /100 WBC
PHOSPHATE SERPL-MCNC: 3 MG/DL (ref 2.7–4.5)
PLATELET # BLD AUTO: 157 K/UL (ref 150–350)
PMV BLD AUTO: 10.7 FL (ref 9.2–12.9)
POTASSIUM SERPL-SCNC: 4 MMOL/L (ref 3.5–5.1)
PROT SERPL-MCNC: 6.4 G/DL (ref 6–8.4)
RBC # BLD AUTO: 3.65 M/UL (ref 4.6–6.2)
SODIUM SERPL-SCNC: 140 MMOL/L (ref 136–145)
WBC # BLD AUTO: 7.64 K/UL (ref 3.9–12.7)

## 2020-06-12 PROCEDURE — 83735 ASSAY OF MAGNESIUM: CPT | Mod: HCNC

## 2020-06-12 PROCEDURE — 99024 PR POST-OP FOLLOW-UP VISIT: ICD-10-PCS | Mod: HCNC,,, | Performed by: COLON & RECTAL SURGERY

## 2020-06-12 PROCEDURE — 63600175 PHARM REV CODE 636 W HCPCS: Mod: HCNC | Performed by: COLON & RECTAL SURGERY

## 2020-06-12 PROCEDURE — 25000003 PHARM REV CODE 250: Mod: HCNC | Performed by: COLON & RECTAL SURGERY

## 2020-06-12 PROCEDURE — 80053 COMPREHEN METABOLIC PANEL: CPT | Mod: HCNC

## 2020-06-12 PROCEDURE — C9113 INJ PANTOPRAZOLE SODIUM, VIA: HCPCS | Mod: HCNC | Performed by: COLON & RECTAL SURGERY

## 2020-06-12 PROCEDURE — 25000003 PHARM REV CODE 250: Mod: HCNC | Performed by: NURSE PRACTITIONER

## 2020-06-12 PROCEDURE — 84100 ASSAY OF PHOSPHORUS: CPT | Mod: HCNC

## 2020-06-12 PROCEDURE — 63600175 PHARM REV CODE 636 W HCPCS: Mod: HCNC | Performed by: INTERNAL MEDICINE

## 2020-06-12 PROCEDURE — 99024 POSTOP FOLLOW-UP VISIT: CPT | Mod: HCNC,,, | Performed by: COLON & RECTAL SURGERY

## 2020-06-12 PROCEDURE — 25000003 PHARM REV CODE 250: Mod: HCNC | Performed by: INTERNAL MEDICINE

## 2020-06-12 PROCEDURE — 94761 N-INVAS EAR/PLS OXIMETRY MLT: CPT | Mod: HCNC

## 2020-06-12 PROCEDURE — 36415 COLL VENOUS BLD VENIPUNCTURE: CPT | Mod: HCNC

## 2020-06-12 PROCEDURE — 21400001 HC TELEMETRY ROOM: Mod: HCNC

## 2020-06-12 PROCEDURE — 94770 HC EXHALED C02 TEST: CPT | Mod: HCNC

## 2020-06-12 PROCEDURE — 99900035 HC TECH TIME PER 15 MIN (STAT): Mod: HCNC

## 2020-06-12 PROCEDURE — 86850 RBC ANTIBODY SCREEN: CPT | Mod: HCNC

## 2020-06-12 PROCEDURE — 85025 COMPLETE CBC W/AUTO DIFF WBC: CPT | Mod: HCNC

## 2020-06-12 RX ADMIN — Medication: at 09:06

## 2020-06-12 RX ADMIN — ISOSORBIDE MONONITRATE 120 MG: 60 TABLET, EXTENDED RELEASE ORAL at 08:06

## 2020-06-12 RX ADMIN — PANTOPRAZOLE SODIUM 40 MG: 40 INJECTION, POWDER, LYOPHILIZED, FOR SOLUTION INTRAVENOUS at 08:06

## 2020-06-12 RX ADMIN — Medication: at 06:06

## 2020-06-12 RX ADMIN — CARVEDILOL 25 MG: 12.5 TABLET, FILM COATED ORAL at 08:06

## 2020-06-12 RX ADMIN — Medication: at 02:06

## 2020-06-12 RX ADMIN — ATORVASTATIN CALCIUM 40 MG: 40 TABLET, FILM COATED ORAL at 08:06

## 2020-06-12 RX ADMIN — DIPHENHYDRAMINE HYDROCHLORIDE 25 MG: 25 CAPSULE ORAL at 07:06

## 2020-06-12 RX ADMIN — SODIUM CHLORIDE, SODIUM LACTATE, POTASSIUM CHLORIDE, AND CALCIUM CHLORIDE: .6; .31; .03; .02 INJECTION, SOLUTION INTRAVENOUS at 09:06

## 2020-06-12 RX ADMIN — DIPHENHYDRAMINE HYDROCHLORIDE 25 MG: 25 CAPSULE ORAL at 12:06

## 2020-06-12 RX ADMIN — SODIUM CHLORIDE, SODIUM LACTATE, POTASSIUM CHLORIDE, AND CALCIUM CHLORIDE: .6; .31; .03; .02 INJECTION, SOLUTION INTRAVENOUS at 07:06

## 2020-06-12 NOTE — ASSESSMENT & PLAN NOTE
-in the setting of lower GI Bleed   -ASA and Plavix on hold   -H/H 7.5/24.4   -PRBCs transfused x 1 unit with current unit in progress   -serial H/H's in progress-will continue to transfuse as needed   -supplemental oxygen as needed   H&H stable over the past 1 day; no gross BRBPR since yesterday AM; s/p 3U PRBCs  6/11/20: Hgb 8.9  -monitor closely and tfx prn

## 2020-06-12 NOTE — PROGRESS NOTES
[facility]  Colorectal Surgery  Progress Note    Subjective:     History of Present Illness:  70 y.o. male  well known to me who is admitted to the hospital with a LGIB. Pt has known colonic adenocarcinoma which is likely his source of bleeding, as he had colonoscopy last week showing other polyps as well as known carcinoma. Pt has a PMHx significant for anemia, GERD, HTN, HLD, CKD, CHF, CVA, CAD and PVD with previous AAA repair who recently underwent a colonoscopy on 06/02/2020 where a malignant-appearing mass was seen in the transverse colon, possible descending colon and biopsied.  Biopsies did confirm this to be adenocarcinoma.  Patient states that he had been having both hematochezia and melena since January intermittently. Patient's last colonoscopy prior to this 1 was in February 2014.  Patient has significant past surgical history including multiple previous abdominal operations including AAA repair, AAA graft excision and right ax fem bypass with small-bowel resection.  He denies current fever, chills, nausea, vomiting.  Patient reports a significant past coronary history requiring stent placement and likely need for further stent/CABG but is unable to undergo at this time due to high risk nature and has chosen medical therapy. Surgery consulted for evaluation of colonic adenoCA with bleeding.    Post-Op Info:  Procedure(s) (LRB):  COLECTOMY, PARTIAL Open with end colostomy construction (N/A)  BLOCK, TRANSVERSUS ABDOMINIS PLANE (N/A)  LYSIS, ADHESIONS (N/A)  CREATION, COLOSTOMY (N/A)  OMENTECTOMY (N/A)   1 Day Post-Op     Interval History: OR yesterday. HD stable overnight. Pain well controlled. CBC stable.     Medications:  Continuous Infusions:   hydromorphone in 0.9 % NaCl 6 mg/30 ml      lactated ringers 100 mL/hr at 06/12/20 0932     Scheduled Meds:   atorvastatin  40 mg Oral Daily    carvediloL  25 mg Oral BID    isosorbide mononitrate  120 mg Oral Daily    nozaseptin   Each  Nostril BID    pantoprazole  40 mg Intravenous Daily     PRN Meds:sodium chloride, sodium chloride, sodium chloride, sodium chloride, sodium chloride, acetaminophen, albuterol-ipratropium, diphenhydrAMINE, hydrALAZINE, naloxone, ondansetron, promethazine (PHENERGAN) IVPB     Review of patient's allergies indicates:   Allergen Reactions    Morphine Itching     Objective:     Vital Signs (Most Recent):  Temp: 97.7 °F (36.5 °C) (06/12/20 0802)  Pulse: 78 (06/12/20 0802)  Resp: 18 (06/12/20 0928)  BP: (!) 169/81 (06/12/20 0802)  SpO2: (!) 94 % (06/12/20 0802) Vital Signs (24h Range):  Temp:  [97.5 °F (36.4 °C)-99 °F (37.2 °C)] 97.7 °F (36.5 °C)  Pulse:  [58-82] 78  Resp:  [10-39] 18  SpO2:  [93 %-100 %] 94 %  BP: (153-182)/(71-85) 169/81  Arterial Line BP: (165-166)/(57-59) 166/59     Weight: 79.4 kg (175 lb)  Body mass index is 23.73 kg/m².    Intake/Output - Last 3 Shifts       06/10 0700 - 06/11 0659 06/11 0700 - 06/12 0659 06/12 0700 - 06/13 0659    P.O. 840      I.V. (mL/kg)  3327.5 (41.9) 29 (0.4)    Blood 729.2 350     IV Piggyback       Total Intake(mL/kg) 1569.2 (19.8) 3677.5 (46.3) 29 (0.4)    Urine (mL/kg/hr) 800 (0.4) 1925 (1)     Drains  1200     Stool  0     Blood  50     Total Output 800 3175     Net +769.2 +502.5 +29           Urine Occurrence 1 x      Stool Occurrence 2 x 1 x           Physical Exam   Constitutional: He is oriented to person, place, and time. He appears well-developed.   HENT:   Head: Normocephalic and atraumatic.   Eyes: Conjunctivae and EOM are normal.   Neck: Normal range of motion. No thyromegaly present.   Cardiovascular: Normal rate and regular rhythm.   Pulmonary/Chest: Effort normal. No respiratory distress.   Abdominal:   Soft, nondistended, appropriately TTP; incision c/d/i without erythema or drainage; RUQ ostomy pink and moist without stool or gas in bag   Musculoskeletal: He exhibits no edema or tenderness.   R foot s/p amputation   Neurological: He is alert and oriented  to person, place, and time.   Skin: Skin is warm and dry. Capillary refill takes less than 2 seconds. No rash noted.   Psychiatric: He has a normal mood and affect.       Significant Labs:  CBC:   Recent Labs   Lab 06/12/20  0528   WBC 7.64   RBC 3.65*   HGB 10.0*   HCT 31.9*      MCV 87   MCH 27.4   MCHC 31.3*     BMP:   Recent Labs   Lab 06/12/20  0528   GLU 98      K 4.0      CO2 23   BUN 18   CREATININE 1.8*   CALCIUM 8.1*   MG 2.3     Assessment/Plan:     * Colon adenocarcinoma  69yo M with multiple medical comorbidities who presents with transverse vs descending colonic adenocarcinoma with possible metastatic lung disease with acute lower GI bleed, recurrent who is now s/p open partial transverse colectomy with end proximal transverse colostomy    - cont NGT for now given extensive WILLIAM in OR  - cont NPO  - cont IVF  - cont ostomy nurse evaluation/education  - OOB encouraged  - PPx: lvnx, ppi    COPD (chronic obstructive pulmonary disease)  Management per primary team    Acute lower GI bleeding  See plan for colon adenocarcinoma    Coronary artery disease involving native coronary artery of native heart without angina pectoris  Management per primary team    Acute on chronic kidney failure  Management per primary team    PVD (peripheral vascular disease)  Management per primary team    Acute blood loss anemia  Management per primary team    Essential hypertension  Management per primary team    Hyperlipidemia  Management per primary team        Leonardo Yang MD  Colorectal Surgery  [facility]

## 2020-06-12 NOTE — ASSESSMENT & PLAN NOTE
-stable   -will hold antihypertensives due to active bleed and resume as appropriate   6/10/20: Restarted a lower dose BB per card recs given BP and HR being able to tolerate given plan for surgery tomorrow  6/11/20: Higher BP's today likely partially due to pain  - Hydralazine 10 mg IV q8H prn added  - Carvedilol dose increased from 12.5 mg po bid to 25 mg po bid

## 2020-06-12 NOTE — ASSESSMENT & PLAN NOTE
BUN/Creatinine 44/3.2   -in the setting of symptomatic anemia   -baseline 1.4-2.5  -blood transfusion in progress   -repeat CMP in am   6/10/20: Slightly improved sCr from yesterday (3.2 -> 3.0), which may slightly improve further with more blood  6/11/20: sCr further improved from 3.0>2.1 (eGFR 20>31)

## 2020-06-12 NOTE — PLAN OF CARE
Remains injury free. Pain managed with PCA Dilaudid. Repositions per self. NGT to suction, clear to slightly tan drainage. Colostomy with no output as of yet. Stoma pink. No s/s acute distress.

## 2020-06-12 NOTE — PROGRESS NOTES
[facility] Hospital Medicine  Progress Note    Patient Name: Kodi Ribeiro  MRN: [id number]  Patient Class: IP- Inpatient   Admission Date: 6/8/2020  Length of Stay: 2 days  Attending Physician: Ruben Hayes MD  Primary Care Provider: Norma Nuñez MD        Subjective:     Principal Problem:Colon adenocarcinoma        HPI:  Kodi Ribeiro is a 70 y.o. male patient with a PMHx of CVA, CHF, COPD, CAD, diverticulosis, GERD, PVD, hyperglycemia, HLD, HTN, Anemia, and MI who presents to the Emergency Department for evaluation of rectal bleeding which onset gradually earlier today. Pt reports having had a bloody stool this morning. Pt states that he was on Plavix, but was stopped by his PCP 10 days ago when he had similar sxs but has continued to take ASA. Pt had a hx of hematochezia since 01/2020 and had colonoscopy last Tuesday with colon cancer diagnosis.  Pt seen by Dr. Wilkerson (General Surgery) as outpatient yesterday with possible options for surgical intervention discussed.  Symptoms are intermittent and moderate in severity. No mitigating or exacerbating factors reported. Associated sxs include constipation, hematochezia, abdominal pain when making a BM, diaphoresis, chills, and syncope.  Patient denies any CP, hematuria, N/V, SOB, light-headedness, and all other sxs at this time. No prior Tx reported. No further complaints or concerns at this time.  Pt denies smoking and use of ETOH.  Pt is a full code and Laury STEWARD'Connnor (wife) at [phone] is the surrogate decision maker.  ER work up showed: H/H 7.5/24.4, CO2 16, Ca 8.2, BUN/Creatinine 44/3.2, Glucose 159, and Albumin 3.3.  CT abdomen/pelvis performed and CT of chest/abdomen/pelvis results on 6/5/2020 reviewed.  ER discussed case with GI and General Surgery.  Hospital Medicine contacted for admission with patient placed in Observation for further evaluation.      Overview/Hospital Course:  6/10/20: No events; Last BM was yesterday AM  (BRBPR), no BM since, which he attributes to being NPO, but today was on clears and is NPO after MN for scheduled general surgery for possible resection of adenocarcinoma in AM. No cp or sob.  - Ordered 2 more units RBCs (completed a total of 3U yesterday).   6/11/20: s/p partial bowel resection with colostomy; tolerated surgery well without obvious intra-operative complications  - Pain control  - Continue to monitor closely    Interval History: s/p partial bowel resection with colostomy    Review of Systems   Constitutional: Negative for fever.   HENT: Negative for hearing loss.    Eyes: Negative for visual disturbance.   Respiratory: Negative for cough and shortness of breath.    Cardiovascular: Negative for chest pain and palpitations.   Gastrointestinal: Positive for abdominal pain.        Painful as expected following partial bowel resection   Genitourinary: Negative for difficulty urinating, dysuria, frequency and hematuria.   Musculoskeletal: Negative for arthralgias and back pain.   Skin: Negative for color change and rash.   Neurological: Positive for weakness. Negative for seizures, syncope, light-headedness, numbness and headaches.   Hematological: Bruises/bleeds easily.   Psychiatric/Behavioral: The patient is not nervous/anxious.      Objective:     Vital Signs (Most Recent):  Temp: 97.5 °F (36.4 °C) (06/11/20 1925)  Pulse: 81 (06/11/20 2129)  Resp: 12 (06/11/20 2000)  BP: (!) 182/85 (06/11/20 2129)  SpO2: (!) 93 % (06/11/20 1925) Vital Signs (24h Range):  Temp:  [97.5 °F (36.4 °C)-99 °F (37.2 °C)] 97.5 °F (36.4 °C)  Pulse:  [58-81] 81  Resp:  [12-39] 12  SpO2:  [93 %-100 %] 93 %  BP: (130-182)/(63-85) 182/85  Arterial Line BP: (165-166)/(57-59) 166/59     Weight: 79.4 kg (175 lb)  Body mass index is 23.73 kg/m².    Intake/Output Summary (Last 24 hours) at 6/11/2020 2211  Last data filed at 6/11/2020 2000  Gross per 24 hour   Intake 3633.37 ml   Output 1075 ml   Net 2558.37 ml      Physical Exam    Constitutional: He is oriented to person, place, and time. He appears well-developed.   HENT:   Head: Normocephalic and atraumatic.   Eyes: Conjunctivae and EOM are normal.   Neck: Normal range of motion. No thyromegaly present.   Cardiovascular: Normal rate and regular rhythm.   Pulmonary/Chest: Effort normal. No respiratory distress.   Abdominal: There is tenderness. There is guarding.   Soft, nontender, nondistended, well-healed previous midline incision   Musculoskeletal: He exhibits no edema or tenderness.   R foot s/p amputation   Neurological: He is alert and oriented to person, place, and time.   Skin: Skin is warm and dry. Capillary refill takes less than 2 seconds. No rash noted.   Psychiatric: He has a normal mood and affect.       Significant Labs:   CBC:   Recent Labs   Lab 06/10/20  0043 06/11/20  0533   WBC  --  6.54   HGB 8.0* 8.9*   HCT 25.4* 28.2*   PLT  --  154     CMP:   Recent Labs   Lab 06/10/20  0523 06/11/20  0533    139   K 4.4 4.2   * 111*   CO2 19* 21*    91   BUN 44* 28*   CREATININE 3.0* 2.1*   CALCIUM 7.9* 8.1*   PROT 6.1 6.2   ALBUMIN 3.0* 3.1*   BILITOT 0.4 0.5   ALKPHOS 115 121   AST 9* 11   ALT 9* 11   ANIONGAP 9 7*   EGFRNONAA 20* 31*     All pertinent labs within the past 24 hours have been reviewed.    Significant Imaging: I have reviewed all pertinent imaging results/findings within the past 24 hours.      Assessment/Plan:      * Colon adenocarcinoma  -pt seen outpatient by Dr. Yang on yesterday   -recent colonoscopy per GI on last Thursday   -options for possible surgical intervention   -Cardiology consulted   -EKG, Echo, and chest xray pending   -antiemetics as needed   -analgesia as needed       Acute lower GI bleeding  -in the setting of adenocarcinoma   -recent colonoscopy with clips within the cecum, with wall thickening present corresponding to the colon neoplasm  -Plavix held 10 days ago by PCP   -symptoms present intermittently since 01/2020  -ASA  held   -PPI   -case discussed with GI with symptoms likely due to colon adenocarcinoma and no further recommendations   -General Surgery on consult   -serial H/H in progress   -downward trend noted with PRBCs transfused x 1 unit in ED and additional unit in progress      Leukocytosis  -likely reactive in the setting of active bleed   -resolved upon repeat CBC post transfusion   -monitor   -repeat CBC in am       Pulmonary nodules  -imaging on 6/5/2020 supports 3 noncalcified pulmonary nodules, measuring between 6 and 8 mm in the middle lobe and left upper lobe.  Due to history early metastasis must be considered.  -pt to benefit from outpatient follow up and further evaluation     COPD (chronic obstructive pulmonary disease)  -Duonebs as needed   -supplemental oxygen as needed       Coronary artery disease involving native coronary artery of native heart without angina pectoris  -ASA and Plavix held due to bleeding   -will resume Coreg when appropriate   -Statin and Imdur continued   -s/p stents x 3 in 2000 and MI       Acute on chronic kidney failure  BUN/Creatinine 44/3.2   -in the setting of symptomatic anemia   -baseline 1.4-2.5  -blood transfusion in progress   -repeat CMP in am   6/10/20: Slightly improved sCr from yesterday (3.2 -> 3.0), which may slightly improve further with more blood  6/11/20: sCr further improved from 3.0>2.1 (eGFR 20>31)    PVD (peripheral vascular disease)  -hx of   -s/p fem-fem bypass graft and left SFA   -pt was followed outpatient by Dr. Jamil ([facility])  -ASA and Plavix on hold due to bleeding       Acute blood loss anemia  -in the setting of lower GI Bleed   -ASA and Plavix on hold   -H/H 7.5/24.4   -PRBCs transfused x 1 unit with current unit in progress   -serial H/H's in progress-will continue to transfuse as needed   -supplemental oxygen as needed   H&H stable over the past 1 day; no gross BRBPR since yesterday AM; s/p 3U PRBCs  6/11/20: Hgb 8.9  -monitor closely and  tfx prn    Essential hypertension  -stable   -will hold antihypertensives due to active bleed and resume as appropriate   6/10/20: Restarted a lower dose BB per card recs given BP and HR being able to tolerate given plan for surgery tomorrow  6/11/20: Higher BP's today likely partially due to pain  - Hydralazine 10 mg IV q8H prn added  - Carvedilol dose increased from 12.5 mg po bid to 25 mg po bid    Hyperlipidemia  -Statin         VTE Risk Mitigation (From admission, onward)    None                Ruben Hayes MD  Department of Hospital Medicine   [facility]

## 2020-06-12 NOTE — SUBJECTIVE & OBJECTIVE
Interval History: s/p partial bowel resection with colostomy    Review of Systems   Constitutional: Negative for fever.   HENT: Negative for hearing loss.    Eyes: Negative for visual disturbance.   Respiratory: Negative for cough and shortness of breath.    Cardiovascular: Negative for chest pain and palpitations.   Gastrointestinal: Positive for abdominal pain.        Painful as expected following partial bowel resection   Genitourinary: Negative for difficulty urinating, dysuria, frequency and hematuria.   Musculoskeletal: Negative for arthralgias and back pain.   Skin: Negative for color change and rash.   Neurological: Positive for weakness. Negative for seizures, syncope, light-headedness, numbness and headaches.   Hematological: Bruises/bleeds easily.   Psychiatric/Behavioral: The patient is not nervous/anxious.      Objective:     Vital Signs (Most Recent):  Temp: 97.5 °F (36.4 °C) (06/11/20 1925)  Pulse: 81 (06/11/20 2129)  Resp: 12 (06/11/20 2000)  BP: (!) 182/85 (06/11/20 2129)  SpO2: (!) 93 % (06/11/20 1925) Vital Signs (24h Range):  Temp:  [97.5 °F (36.4 °C)-99 °F (37.2 °C)] 97.5 °F (36.4 °C)  Pulse:  [58-81] 81  Resp:  [12-39] 12  SpO2:  [93 %-100 %] 93 %  BP: (130-182)/(63-85) 182/85  Arterial Line BP: (165-166)/(57-59) 166/59     Weight: 79.4 kg (175 lb)  Body mass index is 23.73 kg/m².    Intake/Output Summary (Last 24 hours) at 6/11/2020 2211  Last data filed at 6/11/2020 2000  Gross per 24 hour   Intake 3633.37 ml   Output 1075 ml   Net 2558.37 ml      Physical Exam   Constitutional: He is oriented to person, place, and time. He appears well-developed.   HENT:   Head: Normocephalic and atraumatic.   Eyes: Conjunctivae and EOM are normal.   Neck: Normal range of motion. No thyromegaly present.   Cardiovascular: Normal rate and regular rhythm.   Pulmonary/Chest: Effort normal. No respiratory distress.   Abdominal: There is tenderness. There is guarding.   Soft, nontender, nondistended, well-healed  previous midline incision   Musculoskeletal: He exhibits no edema or tenderness.   R foot s/p amputation   Neurological: He is alert and oriented to person, place, and time.   Skin: Skin is warm and dry. Capillary refill takes less than 2 seconds. No rash noted.   Psychiatric: He has a normal mood and affect.       Significant Labs:   CBC:   Recent Labs   Lab 06/10/20  0043 06/11/20  0533   WBC  --  6.54   HGB 8.0* 8.9*   HCT 25.4* 28.2*   PLT  --  154     CMP:   Recent Labs   Lab 06/10/20  0523 06/11/20  0533    139   K 4.4 4.2   * 111*   CO2 19* 21*    91   BUN 44* 28*   CREATININE 3.0* 2.1*   CALCIUM 7.9* 8.1*   PROT 6.1 6.2   ALBUMIN 3.0* 3.1*   BILITOT 0.4 0.5   ALKPHOS 115 121   AST 9* 11   ALT 9* 11   ANIONGAP 9 7*   EGFRNONAA 20* 31*     All pertinent labs within the past 24 hours have been reviewed.    Significant Imaging: I have reviewed all pertinent imaging results/findings within the past 24 hours.

## 2020-06-12 NOTE — NURSING
Checked pH for NG tube verification. NG pH was 7. Talked with NICK Salgado. NICK Salgado ordered xray to verify placement. Tube was advanced approx. 8 cm and placement was verified per xray.

## 2020-06-12 NOTE — ASSESSMENT & PLAN NOTE
69yo M with multiple medical comorbidities who presents with transverse vs descending colonic adenocarcinoma with possible metastatic lung disease with acute lower GI bleed, recurrent who is now s/p open partial transverse colectomy with end proximal transverse colostomy    - cont NGT for now given extensive WILLIAM in OR  - cont NPO  - cont IVF  - cont ostomy nurse evaluation/education  - OOB encouraged  - PPx: lvnx, ppi

## 2020-06-12 NOTE — NURSING
Spoke with NICK Salgado regarding elevated BP. After reviewing home medications and current status, she ordered to restart home Imdur. Will monitor BP throughout night.

## 2020-06-12 NOTE — SUBJECTIVE & OBJECTIVE
Interval History: OR yesterday. HD stable overnight. Pain well controlled. CBC stable.     Medications:  Continuous Infusions:   hydromorphone in 0.9 % NaCl 6 mg/30 ml      lactated ringers 100 mL/hr at 06/12/20 0932     Scheduled Meds:   atorvastatin  40 mg Oral Daily    carvediloL  25 mg Oral BID    isosorbide mononitrate  120 mg Oral Daily    nozaseptin   Each Nostril BID    pantoprazole  40 mg Intravenous Daily     PRN Meds:sodium chloride, sodium chloride, sodium chloride, sodium chloride, sodium chloride, acetaminophen, albuterol-ipratropium, diphenhydrAMINE, hydrALAZINE, naloxone, ondansetron, promethazine (PHENERGAN) IVPB     Review of patient's allergies indicates:   Allergen Reactions    Morphine Itching     Objective:     Vital Signs (Most Recent):  Temp: 97.7 °F (36.5 °C) (06/12/20 0802)  Pulse: 78 (06/12/20 0802)  Resp: 18 (06/12/20 0928)  BP: (!) 169/81 (06/12/20 0802)  SpO2: (!) 94 % (06/12/20 0802) Vital Signs (24h Range):  Temp:  [97.5 °F (36.4 °C)-99 °F (37.2 °C)] 97.7 °F (36.5 °C)  Pulse:  [58-82] 78  Resp:  [10-39] 18  SpO2:  [93 %-100 %] 94 %  BP: (153-182)/(71-85) 169/81  Arterial Line BP: (165-166)/(57-59) 166/59     Weight: 79.4 kg (175 lb)  Body mass index is 23.73 kg/m².    Intake/Output - Last 3 Shifts       06/10 0700 - 06/11 0659 06/11 0700 - 06/12 0659 06/12 0700 - 06/13 0659    P.O. 840      I.V. (mL/kg)  3327.5 (41.9) 29 (0.4)    Blood 729.2 350     IV Piggyback       Total Intake(mL/kg) 1569.2 (19.8) 3677.5 (46.3) 29 (0.4)    Urine (mL/kg/hr) 800 (0.4) 1925 (1)     Drains  1200     Stool  0     Blood  50     Total Output 800 3175     Net +769.2 +502.5 +29           Urine Occurrence 1 x      Stool Occurrence 2 x 1 x           Physical Exam   Constitutional: He is oriented to person, place, and time. He appears well-developed.   HENT:   Head: Normocephalic and atraumatic.   Eyes: Conjunctivae and EOM are normal.   Neck: Normal range of motion. No thyromegaly present.    Cardiovascular: Normal rate and regular rhythm.   Pulmonary/Chest: Effort normal. No respiratory distress.   Abdominal:   Soft, nondistended, appropriately TTP; incision c/d/i without erythema or drainage; RUQ ostomy pink and moist without stool or gas in bag   Musculoskeletal: He exhibits no edema or tenderness.   R foot s/p amputation   Neurological: He is alert and oriented to person, place, and time.   Skin: Skin is warm and dry. Capillary refill takes less than 2 seconds. No rash noted.   Psychiatric: He has a normal mood and affect.       Significant Labs:  CBC:   Recent Labs   Lab 06/12/20  0528   WBC 7.64   RBC 3.65*   HGB 10.0*   HCT 31.9*      MCV 87   MCH 27.4   MCHC 31.3*     BMP:   Recent Labs   Lab 06/12/20  0528   GLU 98      K 4.0      CO2 23   BUN 18   CREATININE 1.8*   CALCIUM 8.1*   MG 2.3

## 2020-06-12 NOTE — ANESTHESIA POSTPROCEDURE EVALUATION
Anesthesia Post Evaluation    Patient: Kodi Ribeiro    Procedure(s) Performed: Procedure(s) (LRB):  COLECTOMY, PARTIAL (N/A)  BLOCK, TRANSVERSUS ABDOMINIS PLANE (N/A)  LYSIS, ADHESIONS (N/A)  CREATION, COLOSTOMY (N/A)  OMENTECTOMY (N/A)    Final Anesthesia Type: general    Patient location during evaluation: PACU  Patient participation: Yes- Able to Participate  Level of consciousness: awake and alert, oriented and awake  Post-procedure vital signs: reviewed and stable  Pain management: adequate  Airway patency: patent    PONV status at discharge: No PONV  Anesthetic complications: no      Cardiovascular status: blood pressure returned to baseline  Respiratory status: unassisted and spontaneous ventilation  Hydration status: euvolemic  Follow-up not needed.          Vitals Value Taken Time   /70 6/12/2020 12:12 PM   Temp 36.5 °C (97.7 °F) 6/12/2020 12:12 PM   Pulse 76 6/12/2020 12:12 PM   Resp 18 6/12/2020 12:12 PM   SpO2 96 % 6/12/2020 12:12 PM         Event Time     Out of Recovery 06/11/2020 14:02:00          Pain/Rommel Score: Pain Rating Prior to Med Admin: 7 (6/12/2020  9:30 AM)  Pain Rating Post Med Admin: 10 (6/11/2020  2:07 PM)  Rommel Score: 10 (6/11/2020  1:45 PM)

## 2020-06-12 NOTE — CONSULTS
"Follow up on Mr. Jackson today, s/p transverse colostomy. He is awake but drowsy, on PCA, wife is at the bedside. RUQ colostomy with surgical pouch intact, no output yet. Removed pouch for teaching. Stoma is pink and moist, 35mm. Cleansed peristomal area with water and gauze. Sprayed peristomal area with cavilon. Ostomy ring applied around stoma. One piece Leon pouch then cut to fit and applied to patient. All of this done while verbalizing steps to patient. He tolerated with minimal discomfort. New colostomy education continued at this time using teach back method. Educated on: emptying and resealing pouch, how and when; changing pouch, how and when; hygiene, activity, and dietary guidelines. Will follow.    Please review Donna's procedure "Pouching : Colostomy or Ileostomy" for instructions on ostomy.stoma care. Change ostomy appliance weekly or IMMEDIATELY IF LEAKING. All ostomy care supplies can be requested from materials management.    OSTOMY CARE SUPPLIES:  One Piece [place] Pouch #[id number]   Brava Stoma Ring #[id number]   Cavilon Spray #[id number]        "

## 2020-06-13 PROBLEM — D72.829 LEUKOCYTOSIS: Status: RESOLVED | Noted: 2020-06-09 | Resolved: 2020-06-13

## 2020-06-13 LAB
BLD PROD TYP BPU: NORMAL
BLOOD UNIT EXPIRATION DATE: NORMAL
BLOOD UNIT TYPE CODE: 5100
BLOOD UNIT TYPE: NORMAL
CODING SYSTEM: NORMAL
DISPENSE STATUS: NORMAL
MAGNESIUM SERPL-MCNC: 2.2 MG/DL (ref 1.6–2.6)
NUM UNITS TRANS PACKED RBC: NORMAL
PHOSPHATE SERPL-MCNC: 2.8 MG/DL (ref 2.7–4.5)

## 2020-06-13 PROCEDURE — 63600175 PHARM REV CODE 636 W HCPCS: Mod: HCNC | Performed by: COLON & RECTAL SURGERY

## 2020-06-13 PROCEDURE — 83735 ASSAY OF MAGNESIUM: CPT | Mod: HCNC

## 2020-06-13 PROCEDURE — S5010 5% DEXTROSE AND 0.45% SALINE: HCPCS | Mod: HCNC | Performed by: COLON & RECTAL SURGERY

## 2020-06-13 PROCEDURE — 21400001 HC TELEMETRY ROOM: Mod: HCNC

## 2020-06-13 PROCEDURE — 25000003 PHARM REV CODE 250: Mod: HCNC | Performed by: COLON & RECTAL SURGERY

## 2020-06-13 PROCEDURE — 94770 HC EXHALED C02 TEST: CPT | Mod: HCNC

## 2020-06-13 PROCEDURE — 99223 PR INITIAL HOSPITAL CARE,LEVL III: ICD-10-PCS | Mod: HCNC,,, | Performed by: INTERNAL MEDICINE

## 2020-06-13 PROCEDURE — 84100 ASSAY OF PHOSPHORUS: CPT | Mod: HCNC

## 2020-06-13 PROCEDURE — 99024 PR POST-OP FOLLOW-UP VISIT: ICD-10-PCS | Mod: HCNC,,, | Performed by: COLON & RECTAL SURGERY

## 2020-06-13 PROCEDURE — 25000003 PHARM REV CODE 250: Mod: HCNC | Performed by: INTERNAL MEDICINE

## 2020-06-13 PROCEDURE — 99223 1ST HOSP IP/OBS HIGH 75: CPT | Mod: HCNC,,, | Performed by: INTERNAL MEDICINE

## 2020-06-13 PROCEDURE — 99024 POSTOP FOLLOW-UP VISIT: CPT | Mod: HCNC,,, | Performed by: COLON & RECTAL SURGERY

## 2020-06-13 PROCEDURE — C9113 INJ PANTOPRAZOLE SODIUM, VIA: HCPCS | Mod: HCNC | Performed by: COLON & RECTAL SURGERY

## 2020-06-13 PROCEDURE — 25000003 PHARM REV CODE 250: Mod: HCNC | Performed by: NURSE PRACTITIONER

## 2020-06-13 PROCEDURE — 94761 N-INVAS EAR/PLS OXIMETRY MLT: CPT | Mod: HCNC

## 2020-06-13 PROCEDURE — 63600175 PHARM REV CODE 636 W HCPCS: Mod: HCNC | Performed by: INTERNAL MEDICINE

## 2020-06-13 PROCEDURE — 99900035 HC TECH TIME PER 15 MIN (STAT): Mod: HCNC

## 2020-06-13 PROCEDURE — 36415 COLL VENOUS BLD VENIPUNCTURE: CPT | Mod: HCNC

## 2020-06-13 RX ORDER — AMLODIPINE BESYLATE 5 MG/1
5 TABLET ORAL DAILY
Status: DISCONTINUED | OUTPATIENT
Start: 2020-06-13 | End: 2020-06-17 | Stop reason: HOSPADM

## 2020-06-13 RX ORDER — HYDROMORPHONE HYDROCHLORIDE 1 MG/ML
0.4 INJECTION, SOLUTION INTRAMUSCULAR; INTRAVENOUS; SUBCUTANEOUS
Status: DISCONTINUED | OUTPATIENT
Start: 2020-06-13 | End: 2020-06-16

## 2020-06-13 RX ORDER — DEXTROSE MONOHYDRATE AND SODIUM CHLORIDE 5; .45 G/100ML; G/100ML
INJECTION, SOLUTION INTRAVENOUS CONTINUOUS
Status: DISCONTINUED | OUTPATIENT
Start: 2020-06-13 | End: 2020-06-16

## 2020-06-13 RX ADMIN — HYDROMORPHONE HYDROCHLORIDE 0.4 MG: 1 INJECTION, SOLUTION INTRAMUSCULAR; INTRAVENOUS; SUBCUTANEOUS at 07:06

## 2020-06-13 RX ADMIN — CARVEDILOL 25 MG: 12.5 TABLET, FILM COATED ORAL at 09:06

## 2020-06-13 RX ADMIN — DIPHENHYDRAMINE HYDROCHLORIDE 25 MG: 25 CAPSULE ORAL at 07:06

## 2020-06-13 RX ADMIN — ATORVASTATIN CALCIUM 40 MG: 40 TABLET, FILM COATED ORAL at 09:06

## 2020-06-13 RX ADMIN — DEXTROSE AND SODIUM CHLORIDE: 5; .45 INJECTION, SOLUTION INTRAVENOUS at 09:06

## 2020-06-13 RX ADMIN — DEXTROSE AND SODIUM CHLORIDE: 5; .45 INJECTION, SOLUTION INTRAVENOUS at 11:06

## 2020-06-13 RX ADMIN — PANTOPRAZOLE SODIUM 40 MG: 40 INJECTION, POWDER, LYOPHILIZED, FOR SOLUTION INTRAVENOUS at 09:06

## 2020-06-13 RX ADMIN — SODIUM CHLORIDE, SODIUM LACTATE, POTASSIUM CHLORIDE, AND CALCIUM CHLORIDE: .6; .31; .03; .02 INJECTION, SOLUTION INTRAVENOUS at 04:06

## 2020-06-13 RX ADMIN — ISOSORBIDE MONONITRATE 120 MG: 60 TABLET, EXTENDED RELEASE ORAL at 09:06

## 2020-06-13 RX ADMIN — HYDROMORPHONE HYDROCHLORIDE 0.4 MG: 1 INJECTION, SOLUTION INTRAMUSCULAR; INTRAVENOUS; SUBCUTANEOUS at 10:06

## 2020-06-13 RX ADMIN — HYDROMORPHONE HYDROCHLORIDE 0.4 MG: 1 INJECTION, SOLUTION INTRAMUSCULAR; INTRAVENOUS; SUBCUTANEOUS at 09:06

## 2020-06-13 RX ADMIN — HYDROMORPHONE HYDROCHLORIDE 0.4 MG: 1 INJECTION, SOLUTION INTRAMUSCULAR; INTRAVENOUS; SUBCUTANEOUS at 11:06

## 2020-06-13 RX ADMIN — Medication: at 04:06

## 2020-06-13 RX ADMIN — DIPHENHYDRAMINE HYDROCHLORIDE 25 MG: 25 CAPSULE ORAL at 04:06

## 2020-06-13 RX ADMIN — AMLODIPINE BESYLATE 5 MG: 5 TABLET ORAL at 09:06

## 2020-06-13 NOTE — CONSULTS
Consult Note  Hematology/Oncology    Consult Requested By: Mimi Marshall MD    Reason for Consult:  Colonic adenocarcinoma    SUBJECTIVE:     History of Present Illness:  Patient is a 70-year-old male with past medical history significant for chronic kidney disease, CHF, CVA, coronary artery disease, hypertension, hyperlipidemia, PVD with previous AAA repair who presented with complaints of hematochezia and melena since January intermittently.  He  recently underwent colonoscopy 06/02/2020 that showed malignant-appearing mass in the transverse colon and possibly descending colon.  The above masses were biopsied and confirmed adenocarcinoma.  Who CT abdomen and pelvis showed stable reticulonodular opacity within the right lower lobe otherwise stable abdomen and pelvis.  Oncology was consulted given the new diagnosis of colonic adenocarcinoma.    Examined patient this morning, he denies any significant abdominal pain.  Denies nausea or vomiting.  Status post open partial transverse colectomy with end proximal transverse colostomy.    Continuous Infusions:   dextrose 5 % and 0.45 % NaCl 75 mL/hr at 06/13/20 0957     Scheduled Meds:   amLODIPine  5 mg Oral Daily    atorvastatin  40 mg Oral Daily    carvediloL  25 mg Oral BID    isosorbide mononitrate  120 mg Oral Daily    nozaseptin   Each Nostril BID    pantoprazole  40 mg Intravenous Daily     PRN Meds:sodium chloride, sodium chloride, sodium chloride, sodium chloride, sodium chloride, acetaminophen, albuterol-ipratropium, diphenhydrAMINE, hydrALAZINE, HYDROmorphone, naloxone, ondansetron, promethazine (PHENERGAN) IVPB    Past Medical History:   Diagnosis Date    Acute on chronic congestive heart failure 1/13/2020    Acute respiratory failure with hypoxia 1/14/2020    Analgesic nephropathy     Anemia     AP (angina pectoris) 1/11/2019    Arthritis     Colon polyp     Repeat colonoscopy due in 9/14    COPD exacerbation 2/6/2020    Coronary artery  disease     Diverticulosis     colonoscopy 2/21/2014    Dysthymia 2/13/2020    Encounter for blood transfusion     GERD (gastroesophageal reflux disease)     Hemorrhoids     colonoscopy 2/21/2014    Horseshoe kidney     Hyperglycemia 3/17/2014    Hyperlipidemia     Hypertension     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    Late complications of amputation stump     rseolved with further amputation( MRSA then none since 2014)    Lipoma of colon     colonoscopy 2/21/2014    Myocardial infarction     per patient 2000 & 9/2012    Peripheral vascular disease     Phantom limb syndrome     patient reports only intermittent not problematic, not worsening    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    Spinal cord disease     L4L5 disc    Stroke     Tobacco dependence     resolved    Ureteral stent retained      Past Surgical History:   Procedure Laterality Date    ABDOMINAL AORTIC ANEURYSM REPAIR      ABDOMINAL AORTIC ANEURYSM REPAIR  1996/2014    AMPUTATION, LOWER LIMB      AORTA - BILATERAL FEMORAL ARTERY BYPASS GRAFT  2014    Left and right leg    COLONOSCOPY N/A 6/2/2020    Procedure: COLONOSCOPY;  Surgeon: Rosanna Harrington MD;  Location: [facility];  Service: Endoscopy;  Laterality: N/A;    COLOSTOMY N/A 6/11/2020    Procedure: CREATION, COLOSTOMY;  Surgeon: Leonardo Yang MD;  Location: [facility] OR;  Service: General;  Laterality: N/A;    CORONARY ANGIOPLASTY WITH STENT PLACEMENT  2000    Three placed in heart    CYSTOSCOPY W/ RETROGRADES Left 5/29/2018    Procedure: CYSTOSCOPY, WITH RETROGRADE PYELOGRAM;  Surgeon: Scooter Jin IV, MD;  Location: [facility] OR;  Service: Urology;  Laterality: Left;    CYSTOSCOPY W/ URETERAL STENT PLACEMENT Left 5/29/2018    Procedure: CYSTOSCOPY, WITH URETERAL STENT INSERTION;  Surgeon: Scooter Jin IV, MD;  Location: [facility] OR;  Service: Urology;  Laterality: Left;    CYSTOSCOPY W/ URETERAL STENT PLACEMENT Left 2/4/2020    Procedure: CYSTOSCOPY, WITH  URETERAL STENT INSERTION;  Surgeon: Scooter Jin IV, MD;  Location: [facility] OR;  Service: Urology;  Laterality: Left;    CYSTOSCOPY W/ URETERAL STENT REMOVAL Left 5/29/2018    Procedure: CYSTOSCOPY, WITH URETERAL STENT REMOVAL;  Surgeon: Scooter Jin IV, MD;  Location: [facility] OR;  Service: Urology;  Laterality: Left;    CYSTOSCOPY W/ URETERAL STENT REMOVAL Left 2/4/2020    Procedure: CYSTOSCOPY, WITH URETERAL STENT REMOVAL;  Surgeon: Scooter Jin IV, MD;  Location: [facility] OR;  Service: Urology;  Laterality: Left;    ESOPHAGOGASTRODUODENOSCOPY N/A 6/2/2020    Procedure: EGD (ESOPHAGOGASTRODUODENOSCOPY);  Surgeon: Rosanna Harrington MD;  Location: [facility] ENDO;  Service: Endoscopy;  Laterality: N/A;    FOOT AMPUTATION THROUGH METATARSAL  1996    left    FOOT SURGERY Bilateral 1980's    per patient multiple toe amputations prior to.  partial foot amputation:first great toe then other toes     INJECTION OF ANESTHETIC AGENT INTO TISSUE PLANE DEFINED BY TRANSVERSUS ABDOMINIS MUSCLE N/A 6/11/2020    Procedure: BLOCK, TRANSVERSUS ABDOMINIS PLANE;  Surgeon: Leonardo Yang MD;  Location: [facility] OR;  Service: General;  Laterality: N/A;    KIDNEY SURGERY  2014    per patient separation of horseshoe kidney @ time of AAA repair    LEFT HEART CATHETERIZATION Left 3/7/2019    Procedure: CATHETERIZATION, HEART, LEFT;  Surgeon: Adriel Boone MD;  Location: [facility] CATH LAB;  Service: Cardiology;  Laterality: Left;  630 admit for IV hydration  10am start    LUNG LOBECTOMY Right 1970s    per patient not cancer    LYSIS OF ADHESIONS N/A 6/11/2020    Procedure: LYSIS, ADHESIONS;  Surgeon: Leonardo Yang MD;  Location: [facility] OR;  Service: General;  Laterality: N/A;    OMENTECTOMY N/A 6/11/2020    Procedure: OMENTECTOMY;  Surgeon: Leonardo Yang MD;  Location: [facility] OR;  Service: General;  Laterality: N/A;    right below knee amputation  2009 (approx)    SMALL INTESTINE SURGERY  2014    per patient partial @ time  of aaa repair  not small bowel - large bowel bowel compromised bythkirillwe AAAbowel    SUBTOTAL COLECTOMY N/A 2020    Procedure: COLECTOMY, PARTIAL;  Surgeon: Leonardo Yang MD;  Location: [facility];  Service: General;  Laterality: N/A;    TONSILLECTOMY   aprox    URETERAL STENT PLACEMENT Left     annually replaced since 2012 or so  Dr Jin     Family History   Problem Relation Age of Onset    Cancer Mother         lung    COPD Mother     Heart disease Father         MI but per patient bc of old age    Diabetes Daughter     Eczema Neg Hx     Lupus Neg Hx     Psoriasis Neg Hx     Melanoma Neg Hx     Kidney disease Neg Hx     Stroke Neg Hx     Mental illness Neg Hx     Mental retardation Neg Hx     Hypertension Neg Hx     Hyperlipidemia Neg Hx     Drug abuse Neg Hx     Alcohol abuse Neg Hx     Depression Neg Hx      Social History     Tobacco Use    Smoking status: Former Smoker     Packs/day: 1.00     Years: 15.00     Pack years: 15.00     Quit date: 2009     Years since quittin.4    Smokeless tobacco: Never Used   Substance Use Topics    Alcohol use: No    Drug use: No     Comment: Is on prescription opiod, no non prescribed use       Review of patient's allergies indicates:   Allergen Reactions    Morphine Itching        Review of Systems:  Constitutional: Negative for fever.   HENT: Negative for hearing loss.    Eyes: Negative for visual disturbance.   Respiratory: Negative for cough and shortness of breath.    Cardiovascular: Negative for chest pain and palpitations.   Gastrointestinal:  Negative for abdominal pain  Genitourinary: Negative for difficulty urinating, dysuria, frequency and hematuria.   Musculoskeletal: Negative for arthralgias and back pain.   Skin: Negative for color change and rash.   Neurological: Positive for weakness. Negative for seizures, syncope, light-headedness, numbness and headaches.   Hematological: Bruises/bleeds easily.    Psychiatric/Behavioral: The patient is not nervous/anxious.        OBJECTIVE:     Vital Signs (Most Recent)  Temp: 98.4 °F (36.9 °C) (06/13/20 0943)  Pulse: 81 (06/13/20 0943)  Resp: 16 (06/13/20 0943)  BP: (!) 174/81 (06/13/20 0943)  SpO2: 95 % (06/13/20 0943)    Pain Assessment: No pain reported at this time    Vital Signs Range (Last 24H):  Temp:  [96.5 °F (35.8 °C)-98.4 °F (36.9 °C)]   Pulse:  [72-90]   Resp:  [10-20]   BP: (150-177)/(70-83)   SpO2:  [92 %-96 %]     Physical Exam:  Constitutional:       Appearance: He is well-developed.   HENT:      Head: Normocephalic and atraumatic.   Eyes:      Extraocular Movements: EOM normal.      Conjunctiva/sclera: Conjunctivae normal.   Neck:      Musculoskeletal: Normal range of motion.      Thyroid: No thyromegaly.   Cardiovascular:      Rate and Rhythm: Normal rate and regular rhythm.   Pulmonary:      Effort: Pulmonary effort is normal. No respiratory distress.   Abdominal:      Comments: Soft, nondistended, RUQ ostomy pink and moist without stool or gas in bag   Musculoskeletal:         General: No tenderness or edema.      Comments: R foot s/p amputation   Skin:     General: Skin is warm and dry.      Capillary Refill: Capillary refill takes less than 2 seconds.      Findings: No rash.   Neurological:      Mental Status: He is alert and oriented to person, place, and time.   Psychiatric:         Mood and Affect: Mood and affect normal.       Laboratory:  CBC with Differential:  No results for input(s): WBC, NEUTROPCT, LYMPH, MONOPCT, EOSPCT, BASOPHIL, RBC, HCT, HGB, MCV, MCH, RDW, PLT, MPV in the last 24 hours.    Invalid input(s): MCMC  CMP:  No results for input(s): GLU, CALCIUM, ALBUMIN, PROT, NA, K, CO2, CL, BUN, CREATININE, ALKPHOS, ALT, AST, BILITOT in the last 24 hours.  BMP: No results for input(s): GLU, CALCIUM, NA, K, CO2, CL, BUN, CREATININE in the last 24 hours.  LFTs: No results for input(s): ALT, AST, ALKPHOS, BILITOT, PROT, ALBUMIN in the last 24  hours.  Haptoglobin: No results for input(s): HAPTOGLOBIN in the last 24 hours.  Tumor Markers: No results for input(s): PSA, CEA, , AFPTM, EU9512,  in the last 24 hours.    Invalid input(s): ALGTM  Immunology: No results for input(s): SPEP, HILL, AMPARO, FREELAMBDALI in the last 24 hours.  Coagulation: No results for input(s): PT, INR, APTT in the last 24 hours.  Specimen (12h ago, onward)    None        Microbiology Results (last 7 days)     ** No results found for the last 168 hours. **          Diagnostic Results:      ASSESSMENT/PLAN:     Patient Active Problem List   Diagnosis    Hiatal hernia with GERD    Hyperlipidemia    Essential hypertension    Foot ulcer - Left Foot    Idiopathic peripheral neuropathy    Acute blood loss anemia    PVD (peripheral vascular disease)    CKD (chronic kidney disease) stage 4, GFR 15-29 ml/min    Horseshoe kidney    CAD;Old MI ; s/p stents x 3 (2000)     Aortic stenosis    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    Preop cardiovascular exam    Ureteral stricture, left    Left carotid artery stenosis    Ureteral stricture    History of transmetatarsal amputation of left foot    Iron deficiency anemia due to chronic blood loss    Persistent proteinuria    CKD stage G4/A3, GFR 15-29 and albumin creatinine ratio >300 mg/g    Elevated serum immunoglobulin free light chains    Unstable angina    Acute on chronic kidney failure    Hypomagnesemia    Renal dysfunction    Hydronephrosis of left kidney    Coronary artery disease involving native coronary artery of native heart without angina pectoris    Chronic respiratory failure with hypoxia    Chest pain, midsternal, H/O known CAD     Centrilobular emphysema    Follow up    Dysthymia    ILSA (iron deficiency anemia)    Acute lower GI bleeding    COPD (chronic obstructive pulmonary disease)    Colon adenocarcinoma    Pulmonary nodules    Leukocytosis         Plan    #  questionable metastatic colon adenocarcinoma:  -status post open partial transverse colectomy with end proximal transverse colostomy on 06/11/2020.  -postsurgical management per surgery service.  -reviewed CT chest abdomen pelvis from 06/05/2020 that showed 3 noncalcified pulmonary nodules measuring between 6 and 8 mm in the middle lobe and left upper lobe.  These appear to be stable from prior CT scans.  -discussed with patient the natural history and prognosis of metastatic colonic adenocarcinoma.  -patient is to follow up with me in the clinic upon discharge for further discussions regarding potential systemic therapy.    # normocytic anemia:  -noted hemoglobin at 10 grams/deciliter with hematocrit at 31.9.  Normal MCV.  -bleeding has significantly improved per patient.  -will continue to monitor.  Target hemoglobin is more than 8 grams/deciliter.      Thank you for allowing us to participate in the care of this patient.  Contact us with any question or concern.    Missael Choi MD

## 2020-06-13 NOTE — PROGRESS NOTES
[facility] Hospital Medicine  Progress Note    Patient Name: Kodi Ribeiro  MRN: [id number]  Patient Class: IP- Inpatient   Admission Date: 6/8/2020  Length of Stay: 4 days  Attending Physician: Mimi Marshall MD  Primary Care Provider: Norma Nuñez MD        Subjective:     Principal Problem:Colon adenocarcinoma        HPI:  Kodi Ribeiro is a 70 y.o. male patient with a PMHx of CVA, CHF, COPD, CAD, diverticulosis, GERD, PVD, hyperglycemia, HLD, HTN, Anemia, and MI who presents to the Emergency Department for evaluation of rectal bleeding which onset gradually earlier today. Pt reports having had a bloody stool this morning. Pt states that he was on Plavix, but was stopped by his PCP 10 days ago when he had similar sxs but has continued to take ASA. Pt had a hx of hematochezia since 01/2020 and had colonoscopy last Tuesday with colon cancer diagnosis. Pt seen by Dr. Wilkerson (General Surgery) as outpatient yesterday with possible options for surgical intervention discussed.  Symptoms are intermittent and moderate in severity. No mitigating or exacerbating factors reported. Associated sxs include constipation, hematochezia, abdominal pain when making a BM, diaphoresis, chills, and syncope.  Patient denies any CP, hematuria, N/V, SOB, light-headedness, and all other sxs at this time. No prior Tx reported. No further complaints or concerns at this time.  Pt denies smoking and use of ETOH.  Pt is a full code and Laury STEWARD'Connnor (wife) at [phone] is the surrogate decision maker.  ER work up showed: H/H 7.5/24.4, CO2 16, Ca 8.2, BUN/Creatinine 44/3.2, Glucose 159, and Albumin 3.3.  CT abdomen/pelvis performed and CT of chest/abdomen/pelvis results on 6/5/2020 reviewed.  ER discussed case with GI and General Surgery.  Hospital Medicine contacted for admission with patient placed in Observation for further evaluation.      Overview/Hospital Course:  6/10/20: No events; Last BM was yesterday  AM (BRBPR), no BM since, which he attributes to being NPO, but today was on clears and is NPO after MN for scheduled general surgery for possible resection of adenocarcinoma in AM. No cp or sob.  - Ordered 2 more units RBCs (completed a total of 3U yesterday).   6/11/20: s/p partial bowel resection with colostomy; tolerated surgery well without obvious intra-operative complications  - Pain control  - Continue to monitor closely  6/12/20: POD#2; no events o/n; sBP's 153-182 range, which is likely 2/2 pain, which he describes as abn tenderness w/ movement    Interval History: No events; sBP's 153-182, which is likely 2/2 abn pain     Review of Systems   Constitutional: Negative for fever.   HENT: Negative for hearing loss.    Eyes: Negative for visual disturbance.   Respiratory: Negative for cough and shortness of breath.    Cardiovascular: Negative for chest pain and palpitations.   Gastrointestinal: Positive for abdominal pain (tenderness when moving).        Painful as expected following partial bowel resection   Genitourinary: Negative for difficulty urinating, dysuria, frequency and hematuria.   Musculoskeletal: Negative for arthralgias and back pain.   Skin: Negative for color change and rash.   Neurological: Positive for weakness. Negative for seizures, syncope, light-headedness, numbness and headaches.   Hematological: Bruises/bleeds easily.   Psychiatric/Behavioral: The patient is not nervous/anxious.      Objective:     Vital Signs (Most Recent):  Temp: 97.8 °F (36.6 °C) (06/13/20 0345)  Pulse: 76 (06/13/20 0727)  Resp: 14 (06/13/20 0727)  BP: (!) 169/72 (06/13/20 0345)  SpO2: 95 % (06/13/20 0727) Vital Signs (24h Range):  Temp:  [96.5 °F (35.8 °C)-98.1 °F (36.7 °C)] 97.8 °F (36.6 °C)  Pulse:  [72-90] 76  Resp:  [10-20] 14  SpO2:  [92 %-96 %] 95 %  BP: (150-177)/(70-83) 169/72     Weight: 75.2 kg (165 lb 12.6 oz)  Body mass index is 22.48 kg/m².    Intake/Output Summary (Last 24 hours) at 6/13/2020  0801  Last data filed at 6/13/2020 0721  Gross per 24 hour   Intake 2567.57 ml   Output 2800 ml   Net -232.43 ml      Physical Exam  Constitutional:       Appearance: He is well-developed.   HENT:      Head: Normocephalic and atraumatic.   Eyes:      Extraocular Movements: EOM normal.      Conjunctiva/sclera: Conjunctivae normal.   Neck:      Musculoskeletal: Normal range of motion.      Thyroid: No thyromegaly.   Cardiovascular:      Rate and Rhythm: Normal rate and regular rhythm.   Pulmonary:      Effort: Pulmonary effort is normal. No respiratory distress.   Abdominal:      Tenderness: There is abdominal tenderness (less TTP today). There is no guarding.      Comments: Soft, nondistended, appropriately TTP; incision c/d/i without erythema or drainage; RUQ ostomy pink and moist without stool or gas in bag   Musculoskeletal:         General: No tenderness or edema.      Comments: R foot s/p amputation   Skin:     General: Skin is warm and dry.      Capillary Refill: Capillary refill takes less than 2 seconds.      Findings: No rash.   Neurological:      Mental Status: He is alert and oriented to person, place, and time.   Psychiatric:         Mood and Affect: Mood and affect normal.         Significant Labs:   CBC:   Recent Labs   Lab 06/12/20  0528   WBC 7.64   HGB 10.0*   HCT 31.9*        CMP:   Recent Labs   Lab 06/12/20  0528      K 4.0      CO2 23   GLU 98   BUN 18   CREATININE 1.8*   CALCIUM 8.1*   PROT 6.4   ALBUMIN 3.0*   BILITOT 0.6   ALKPHOS 129   AST 14   ALT 13   ANIONGAP 8   EGFRNONAA 37*     All pertinent labs within the past 24 hours have been reviewed.    Significant Imaging: I have reviewed all pertinent imaging results/findings within the past 24 hours.      Assessment/Plan:      * Colon adenocarcinoma  -pt seen outpatient by Dr. Yang on yesterday   -recent colonoscopy per GI on last Thursday   -options for possible surgical intervention   -Cardiology consulted   -EKG, Echo,  and chest xray pending   -antiemetics as needed   -analgesia as needed       Acute lower GI bleeding  -in the setting of adenocarcinoma   -recent colonoscopy with clips within the cecum, with wall thickening present corresponding to the colon neoplasm  -Plavix held 10 days ago by PCP   -symptoms present intermittently since 01/2020  -ASA held   -PPI   -case discussed with GI with symptoms likely due to colon adenocarcinoma and no further recommendations   -General Surgery on consult   -serial H/H in progress   -downward trend noted with PRBCs transfused x 1 unit in ED and additional unit in progress      Leukocytosis  -likely reactive in the setting of active bleed   -resolved upon repeat CBC post transfusion   -monitor   -repeat CBC in am       Pulmonary nodules  -imaging on 6/5/2020 supports 3 noncalcified pulmonary nodules, measuring between 6 and 8 mm in the middle lobe and left upper lobe.  Due to history early metastasis must be considered.  -pt to benefit from outpatient follow up and further evaluation     COPD (chronic obstructive pulmonary disease)  -Duonebs as needed   -supplemental oxygen as needed       Coronary artery disease involving native coronary artery of native heart without angina pectoris  -ASA and Plavix held due to bleeding   -will resume Coreg when appropriate   -Statin and Imdur continued   -s/p stents x 3 in 2000 and MI       Acute on chronic kidney failure  BUN/Creatinine 44/3.2   -in the setting of symptomatic anemia   -baseline 1.4-2.5  -blood transfusion in progress   -repeat CMP in am   6/10/20: Slightly improved sCr from yesterday (3.2 -> 3.0), which may slightly improve further with more blood  6/11/20: sCr further improved from 3.0>2.1 (eGFR 20>31)    PVD (peripheral vascular disease)  -hx of   -s/p fem-fem bypass graft and left SFA   -pt was followed outpatient by Dr. Jamil ([facility])  -ASA and Plavix on hold due to bleeding       Acute blood loss anemia  -in the setting  of lower GI Bleed   -ASA and Plavix on hold   -H/H 7.5/24.4   -PRBCs transfused x 1 unit with current unit in progress   -serial H/H's in progress-will continue to transfuse as needed   -supplemental oxygen as needed   H&H stable over the past 1 day; no gross BRBPR since yesterday AM; s/p 3U PRBCs  6/11/20: Hgb 8.9  -monitor closely and tfx prn    Essential hypertension  -stable   -will hold antihypertensives due to active bleed and resume as appropriate   6/10/20: Restarted a lower dose BB per card recs given BP and HR being able to tolerate given plan for surgery tomorrow  6/11/20: Higher BP's today likely partially due to pain  - Hydralazine 10 mg IV q8H prn added  - Carvedilol dose increased from 12.5 mg po bid to 25 mg po bid  6/12/20: sBPs 153-182, which is likely 2/2 POD#2 pain despite PCA pump  -Amlodipine 5 mg po qd added     Hyperlipidemia  -Statin         VTE Risk Mitigation (From admission, onward)    None                Ruben Hayes MD  Department of Hospital Medicine   [facility]

## 2020-06-13 NOTE — SUBJECTIVE & OBJECTIVE
Interval History: doing well. NGT clamped by Surgery    Review of Systems   Constitutional: Negative for fever.   HENT: Negative for hearing loss.         NGT clamped     Eyes: Negative for visual disturbance.   Respiratory: Negative for cough and shortness of breath.    Cardiovascular: Negative for chest pain and palpitations.   Gastrointestinal: Positive for abdominal pain (tenderness when moving).        Painful as expected following partial bowel resection   Genitourinary: Negative for difficulty urinating, dysuria, frequency and hematuria.   Musculoskeletal: Negative for arthralgias and back pain.   Skin: Negative for color change and rash.   Neurological: Positive for weakness. Negative for seizures, syncope, light-headedness, numbness and headaches.   Hematological: Bruises/bleeds easily.   Psychiatric/Behavioral: The patient is not nervous/anxious.       Objective:     Vital Signs (Most Recent):  Temp: 98.1 °F (36.7 °C) (06/13/20 1633)  Pulse: 79 (06/13/20 1633)  Resp: 16 (06/13/20 1633)  BP: (!) 153/68 (06/13/20 1633)  SpO2: 96 % (06/13/20 1633) Vital Signs (24h Range):  Temp:  [96.5 °F (35.8 °C)-98.4 °F (36.9 °C)] 98.1 °F (36.7 °C)  Pulse:  [72-90] 79  Resp:  [10-20] 16  SpO2:  [92 %-96 %] 96 %  BP: (146-177)/(66-83) 153/68     Weight: 75.2 kg (165 lb 12.6 oz)  Body mass index is 22.48 kg/m².    Intake/Output Summary (Last 24 hours) at 6/13/2020 1819  Last data filed at 6/13/2020 1500  Gross per 24 hour   Intake 2910.32 ml   Output 2110 ml   Net 800.32 ml      Physical Exam  Constitutional:       Appearance: He is well-developed.   HENT:      Head: Normocephalic and atraumatic.   Eyes:      Extraocular Movements: EOM normal.      Conjunctiva/sclera: Conjunctivae normal.   Neck:      Musculoskeletal: Normal range of motion.      Thyroid: No thyromegaly.   Cardiovascular:      Rate and Rhythm: Normal rate and regular rhythm.   Pulmonary:      Effort: Pulmonary effort is normal. No respiratory distress.    Abdominal:      Comments: Soft, nondistended, appropriately TTP; incision c/d/i without erythema or drainage; RUQ ostomy pink and moist without stool or gas in bag   Musculoskeletal:         General: No tenderness or edema.      Comments: R foot s/p amputation   Skin:     General: Skin is warm and dry.      Capillary Refill: Capillary refill takes less than 2 seconds.      Findings: No rash.   Neurological:      Mental Status: He is alert and oriented to person, place, and time.   Psychiatric:         Mood and Affect: Mood and affect normal.         Significant Labs:   CBC:   Recent Labs   Lab 06/12/20  0528   WBC 7.64   HGB 10.0*   HCT 31.9*        CMP:   Recent Labs   Lab 06/12/20  0528      K 4.0      CO2 23   GLU 98   BUN 18   CREATININE 1.8*   CALCIUM 8.1*   PROT 6.4   ALBUMIN 3.0*   BILITOT 0.6   ALKPHOS 129   AST 14   ALT 13   ANIONGAP 8   EGFRNONAA 37*       Significant Imaging: I have reviewed all pertinent imaging results/findings within the past 24 hours.

## 2020-06-13 NOTE — PROGRESS NOTES
[facility]  Colorectal Surgery  Progress Note    Subjective:     History of Present Illness:  70 y.o. male well known to me who is admitted to the hospital with a LGIB. Pt has known colonic adenocarcinoma which is likely his source of bleeding, as he had colonoscopy last week showing other polyps as well as known carcinoma. Pt has a PMHx significant for anemia, GERD, HTN, HLD, CKD, CHF, CVA, CAD and PVD with previous AAA repair who recently underwent a colonoscopy on 06/02/2020 where a malignant-appearing mass was seen in the transverse colon, possible descending colon and biopsied.  Biopsies did confirm this to be adenocarcinoma.  Patient states that he had been having both hematochezia and melena since January intermittently. Patient's last colonoscopy prior to this 1 was in February 2014.  Patient has significant past surgical history including multiple previous abdominal operations including AAA repair, AAA graft excision and right ax fem bypass with small-bowel resection.  He denies current fever, chills, nausea, vomiting.  Patient reports a significant past coronary history requiring stent placement and likely need for further stent/CABG but is unable to undergo at this time due to high risk nature and has chosen medical therapy. Surgery consulted for evaluation of colonic adenoCA with bleeding.    Post-Op Info:  Procedure(s) (LRB):  COLECTOMY, PARTIAL (N/A)  BLOCK, TRANSVERSUS ABDOMINIS PLANE (N/A)  LYSIS, ADHESIONS (N/A)  CREATION, COLOSTOMY (N/A)  OMENTECTOMY (N/A)   2 Days Post-Op     Interval History: Remains HD stable. Pain well controlled.    Medications:  Continuous Infusions:   dextrose 5 % and 0.45 % NaCl       Scheduled Meds:   amLODIPine  5 mg Oral Daily    atorvastatin  40 mg Oral Daily    carvediloL  25 mg Oral BID    isosorbide mononitrate  120 mg Oral Daily    nozaseptin   Each Nostril BID    pantoprazole  40 mg Intravenous Daily     PRN Meds:sodium chloride, sodium  chloride, sodium chloride, sodium chloride, sodium chloride, acetaminophen, albuterol-ipratropium, diphenhydrAMINE, hydrALAZINE, HYDROmorphone, naloxone, ondansetron, promethazine (PHENERGAN) IVPB     Review of patient's allergies indicates:   Allergen Reactions    Morphine Itching     Objective:     Vital Signs (Most Recent):  Temp: 97.8 °F (36.6 °C) (06/13/20 0345)  Pulse: 76 (06/13/20 0727)  Resp: 14 (06/13/20 0727)  BP: (!) 169/72 (06/13/20 0345)  SpO2: 95 % (06/13/20 0727) Vital Signs (24h Range):  Temp:  [96.5 °F (35.8 °C)-98.1 °F (36.7 °C)] 97.8 °F (36.6 °C)  Pulse:  [72-90] 76  Resp:  [10-20] 14  SpO2:  [92 %-96 %] 95 %  BP: (150-177)/(70-83) 169/72     Weight: 75.2 kg (165 lb 12.6 oz)  Body mass index is 22.48 kg/m².    Intake/Output - Last 3 Shifts       06/11 0700 - 06/12 0659 06/12 0700 - 06/13 0659 06/13 0700 - 06/14 0659    P.O.       I.V. (mL/kg) 3327.5 (41.9) 2578.6 (34.3) 7 (0.1)    Blood 350 0     Other  0     Total Intake(mL/kg) 3677.5 (46.3) 2578.6 (34.3) 7 (0.1)    Urine (mL/kg/hr) 1925 (1) 2550 (1.4)     Drains 1200 250     Stool 0 0     Blood 50      Total Output 3175 2800     Net +502.5 -221.4 +7           Stool Occurrence 1 x            Physical Exam  Constitutional:       Appearance: He is well-developed.   HENT:      Head: Normocephalic and atraumatic.   Eyes:      Extraocular Movements: EOM normal.      Conjunctiva/sclera: Conjunctivae normal.   Neck:      Musculoskeletal: Normal range of motion.      Thyroid: No thyromegaly.   Cardiovascular:      Rate and Rhythm: Normal rate and regular rhythm.   Pulmonary:      Effort: Pulmonary effort is normal. No respiratory distress.   Abdominal:      Comments: Soft, nondistended, appropriately TTP; incision c/d/i without erythema or drainage; RUQ ostomy pink and moist without stool or gas in bag   Musculoskeletal:         General: No tenderness or edema.      Comments: R foot s/p amputation   Skin:     General: Skin is warm and dry.       Capillary Refill: Capillary refill takes less than 2 seconds.      Findings: No rash.   Neurological:      Mental Status: He is alert and oriented to person, place, and time.   Psychiatric:         Mood and Affect: Mood and affect normal.         Significant Labs:  CBC:   Recent Labs   Lab 06/12/20  0528   WBC 7.64   RBC 3.65*   HGB 10.0*   HCT 31.9*      MCV 87   MCH 27.4   MCHC 31.3*     BMP:   Recent Labs   Lab 06/12/20  0528 06/13/20  0513   GLU 98  --      --    K 4.0  --      --    CO2 23  --    BUN 18  --    CREATININE 1.8*  --    CALCIUM 8.1*  --    MG 2.3 2.2     Assessment/Plan:     * Colon adenocarcinoma  69yo M with multiple medical comorbidities who presents with transverse vs descending colonic adenocarcinoma with possible metastatic lung disease with acute lower GI bleed, recurrent who is now s/p open partial transverse colectomy with end proximal transverse colostomy on 6/11/2020    - cont NGT but can start clamping trials given clear liquid output and no distention  - cont NPO  - cont IVF  - DC powell  - cont ostomy nurse evaluation/education  - OOB encouraged  - PPx: lvnx, ppi    COPD (chronic obstructive pulmonary disease)  Management per primary team    Acute lower GI bleeding  See plan for colon adenocarcinoma    Coronary artery disease involving native coronary artery of native heart without angina pectoris  Management per primary team    Acute on chronic kidney failure  Management per primary team    PVD (peripheral vascular disease)  Management per primary team    Acute blood loss anemia  Management per primary team    Essential hypertension  Management per primary team    Hyperlipidemia  Management per primary team        Leonardo Yang MD  Colorectal Surgery  [facility]

## 2020-06-13 NOTE — ASSESSMENT & PLAN NOTE
-pt seen outpatient by Dr. Yang on yesterday   -recent colonoscopy per GI on last Thursday   -options for possible surgical intervention   -Cardiology consulted   -EKG, Echo, and chest xray pending   -antiemetics as needed   -analgesia as needed     6/13/2020  Oncology input appreciated.   Patient is not interested in chemotherapy  General surgery managing postoperatively.

## 2020-06-13 NOTE — PLAN OF CARE
Remains injury free. Pain managed with IV pain meds. PCA discontinued. Loza discontinued, voids without difficulty. NGT clamped, very low residual. Flatus in colostomy bag. Patient states he is hungry for soup. Will monitor.

## 2020-06-13 NOTE — PLAN OF CARE
Chart reviewed, pt resting in bed with call light and personal belongings within reach. Vitals stable, heart monitor 8620. PCA pump to control pain. NG tube connected to low continuous suction, PH 4.5 tested with PH strip. NPO except ice chips and sips with meds. Bed alarm active. PRN medication given for itching. Loza intact and draining, no problems noted. Pt turned independently. Colostomy intact, no stool noted. Midline incision intact with dressing drainage marked. LR infusing at 100ml/hr. Pt absent of injury throughout shift, will continue to monitor.

## 2020-06-13 NOTE — ASSESSMENT & PLAN NOTE
71yo M with multiple medical comorbidities who presents with transverse vs descending colonic adenocarcinoma with possible metastatic lung disease with acute lower GI bleed, recurrent who is now s/p open partial transverse colectomy with end proximal transverse colostomy on 6/11/2020    - cont NGT but can start clamping trials given clear liquid output and no distention  - cont NPO  - cont IVF  - DC powell  - cont ostomy nurse evaluation/education  - OOB encouraged  - PPx: lvnx, ppi

## 2020-06-13 NOTE — SUBJECTIVE & OBJECTIVE
Interval History: Remains HD stable. Pain well controlled.    Medications:  Continuous Infusions:   dextrose 5 % and 0.45 % NaCl       Scheduled Meds:   amLODIPine  5 mg Oral Daily    atorvastatin  40 mg Oral Daily    carvediloL  25 mg Oral BID    isosorbide mononitrate  120 mg Oral Daily    nozaseptin   Each Nostril BID    pantoprazole  40 mg Intravenous Daily     PRN Meds:sodium chloride, sodium chloride, sodium chloride, sodium chloride, sodium chloride, acetaminophen, albuterol-ipratropium, diphenhydrAMINE, hydrALAZINE, HYDROmorphone, naloxone, ondansetron, promethazine (PHENERGAN) IVPB     Review of patient's allergies indicates:   Allergen Reactions    Morphine Itching     Objective:     Vital Signs (Most Recent):  Temp: 97.8 °F (36.6 °C) (06/13/20 0345)  Pulse: 76 (06/13/20 0727)  Resp: 14 (06/13/20 0727)  BP: (!) 169/72 (06/13/20 0345)  SpO2: 95 % (06/13/20 0727) Vital Signs (24h Range):  Temp:  [96.5 °F (35.8 °C)-98.1 °F (36.7 °C)] 97.8 °F (36.6 °C)  Pulse:  [72-90] 76  Resp:  [10-20] 14  SpO2:  [92 %-96 %] 95 %  BP: (150-177)/(70-83) 169/72     Weight: 75.2 kg (165 lb 12.6 oz)  Body mass index is 22.48 kg/m².    Intake/Output - Last 3 Shifts       06/11 0700 - 06/12 0659 06/12 0700 - 06/13 0659 06/13 0700 - 06/14 0659    P.O.       I.V. (mL/kg) 3327.5 (41.9) 2578.6 (34.3) 7 (0.1)    Blood 350 0     Other  0     Total Intake(mL/kg) 3677.5 (46.3) 2578.6 (34.3) 7 (0.1)    Urine (mL/kg/hr) 1925 (1) 2550 (1.4)     Drains 1200 250     Stool 0 0     Blood 50      Total Output 3175 2800     Net +502.5 -221.4 +7           Stool Occurrence 1 x            Physical Exam  Constitutional:       Appearance: He is well-developed.   HENT:      Head: Normocephalic and atraumatic.   Eyes:      Extraocular Movements: EOM normal.      Conjunctiva/sclera: Conjunctivae normal.   Neck:      Musculoskeletal: Normal range of motion.      Thyroid: No thyromegaly.   Cardiovascular:      Rate and Rhythm: Normal rate and  regular rhythm.   Pulmonary:      Effort: Pulmonary effort is normal. No respiratory distress.   Abdominal:      Comments: Soft, nondistended, appropriately TTP; incision c/d/i without erythema or drainage; RUQ ostomy pink and moist without stool or gas in bag   Musculoskeletal:         General: No tenderness or edema.      Comments: R foot s/p amputation   Skin:     General: Skin is warm and dry.      Capillary Refill: Capillary refill takes less than 2 seconds.      Findings: No rash.   Neurological:      Mental Status: He is alert and oriented to person, place, and time.   Psychiatric:         Mood and Affect: Mood and affect normal.         Significant Labs:  CBC:   Recent Labs   Lab 06/12/20 0528   WBC 7.64   RBC 3.65*   HGB 10.0*   HCT 31.9*      MCV 87   MCH 27.4   MCHC 31.3*     BMP:   Recent Labs   Lab 06/12/20 0528 06/13/20  0513   GLU 98  --      --    K 4.0  --      --    CO2 23  --    BUN 18  --    CREATININE 1.8*  --    CALCIUM 8.1*  --    MG 2.3 2.2

## 2020-06-13 NOTE — ASSESSMENT & PLAN NOTE
-stable   -will hold antihypertensives due to active bleed and resume as appropriate   6/10/20: Restarted a lower dose BB per card recs given BP and HR being able to tolerate given plan for surgery tomorrow  6/11/20: Higher BP's today likely partially due to pain  - Hydralazine 10 mg IV q8H prn added  - Carvedilol dose increased from 12.5 mg po bid to 25 mg po bid  6/12/20: sBPs 153-182, which is likely 2/2 POD#2 pain despite PCA pump  -Amlodipine 5 mg po qd added

## 2020-06-13 NOTE — SUBJECTIVE & OBJECTIVE
Interval History: No events; sBP's 153-182, which is likely 2/2 abn pain     Review of Systems   Constitutional: Negative for fever.   HENT: Negative for hearing loss.    Eyes: Negative for visual disturbance.   Respiratory: Negative for cough and shortness of breath.    Cardiovascular: Negative for chest pain and palpitations.   Gastrointestinal: Positive for abdominal pain (tenderness when moving).        Painful as expected following partial bowel resection   Genitourinary: Negative for difficulty urinating, dysuria, frequency and hematuria.   Musculoskeletal: Negative for arthralgias and back pain.   Skin: Negative for color change and rash.   Neurological: Positive for weakness. Negative for seizures, syncope, light-headedness, numbness and headaches.   Hematological: Bruises/bleeds easily.   Psychiatric/Behavioral: The patient is not nervous/anxious.      Objective:     Vital Signs (Most Recent):  Temp: 97.8 °F (36.6 °C) (06/13/20 0345)  Pulse: 76 (06/13/20 0727)  Resp: 14 (06/13/20 0727)  BP: (!) 169/72 (06/13/20 0345)  SpO2: 95 % (06/13/20 0727) Vital Signs (24h Range):  Temp:  [96.5 °F (35.8 °C)-98.1 °F (36.7 °C)] 97.8 °F (36.6 °C)  Pulse:  [72-90] 76  Resp:  [10-20] 14  SpO2:  [92 %-96 %] 95 %  BP: (150-177)/(70-83) 169/72     Weight: 75.2 kg (165 lb 12.6 oz)  Body mass index is 22.48 kg/m².    Intake/Output Summary (Last 24 hours) at 6/13/2020 0801  Last data filed at 6/13/2020 0721  Gross per 24 hour   Intake 2567.57 ml   Output 2800 ml   Net -232.43 ml      Physical Exam  Constitutional:       Appearance: He is well-developed.   HENT:      Head: Normocephalic and atraumatic.   Eyes:      Extraocular Movements: EOM normal.      Conjunctiva/sclera: Conjunctivae normal.   Neck:      Musculoskeletal: Normal range of motion.      Thyroid: No thyromegaly.   Cardiovascular:      Rate and Rhythm: Normal rate and regular rhythm.   Pulmonary:      Effort: Pulmonary effort is normal. No respiratory distress.    Abdominal:      Tenderness: There is abdominal tenderness (less TTP today). There is no guarding.      Comments: Soft, nondistended, appropriately TTP; incision c/d/i without erythema or drainage; RUQ ostomy pink and moist without stool or gas in bag   Musculoskeletal:         General: No tenderness or edema.      Comments: R foot s/p amputation   Skin:     General: Skin is warm and dry.      Capillary Refill: Capillary refill takes less than 2 seconds.      Findings: No rash.   Neurological:      Mental Status: He is alert and oriented to person, place, and time.   Psychiatric:         Mood and Affect: Mood and affect normal.         Significant Labs:   CBC:   Recent Labs   Lab 06/12/20  0528   WBC 7.64   HGB 10.0*   HCT 31.9*        CMP:   Recent Labs   Lab 06/12/20  0528      K 4.0      CO2 23   GLU 98   BUN 18   CREATININE 1.8*   CALCIUM 8.1*   PROT 6.4   ALBUMIN 3.0*   BILITOT 0.6   ALKPHOS 129   AST 14   ALT 13   ANIONGAP 8   EGFRNONAA 37*     All pertinent labs within the past 24 hours have been reviewed.    Significant Imaging: I have reviewed all pertinent imaging results/findings within the past 24 hours.

## 2020-06-13 NOTE — PROGRESS NOTES
[facility] Hospital Medicine  Progress Note    Patient Name: Kodi Ribeiro  MRN: [id number]  Patient Class: IP- Inpatient   Admission Date: 6/8/2020  Length of Stay: 4 days  Attending Physician: Mimi Marshall MD  Primary Care Provider: Norma Nuñez MD    Subjective:     Principal Problem:Colon adenocarcinoma        HPI:  Kodi Ribeiro is a 70 y.o. male patient with a PMHx of CVA, CHF, COPD, CAD, diverticulosis, GERD, PVD, hyperglycemia, HLD, HTN, Anemia, and MI who presents to the Emergency Department for evaluation of rectal bleeding which onset gradually earlier today. Pt reports having had a bloody stool this morning. Pt states that he was on Plavix, but was stopped by his PCP 10 days ago when he had similar sxs but has continued to take ASA. Pt had a hx of hematochezia since 01/2020 and had colonoscopy last Tuesday with colon cancer diagnosis. Pt seen by Dr. Wilkerson (General Surgery) as outpatient yesterday with possible options for surgical intervention discussed.  Symptoms are intermittent and moderate in severity. No mitigating or exacerbating factors reported. Associated sxs include constipation, hematochezia, abdominal pain when making a BM, diaphoresis, chills, and syncope.  Patient denies any CP, hematuria, N/V, SOB, light-headedness, and all other sxs at this time. No prior Tx reported. No further complaints or concerns at this time.  Pt denies smoking and use of ETOH.  Pt is a full code and Laury STEWARD'Connnor (wife) at [phone] is the surrogate decision maker.  ER work up showed: H/H 7.5/24.4, CO2 16, Ca 8.2, BUN/Creatinine 44/3.2, Glucose 159, and Albumin 3.3.  CT abdomen/pelvis performed and CT of chest/abdomen/pelvis results on 6/5/2020 reviewed.  ER discussed case with GI and General Surgery.  Hospital Medicine contacted for admission with patient placed in Observation for further evaluation.      Overview/Hospital Course:  6/10/20: No events; Last BM was yesterday AM  (BRBPR), no BM since, which he attributes to being NPO, but today was on clears and is NPO after MN for scheduled general surgery for possible resection of adenocarcinoma in AM. No cp or sob.  - Ordered 2 more units RBCs (completed a total of 3U yesterday).   6/11/20: s/p partial bowel resection with colostomy; tolerated surgery well without obvious intra-operative complications  - Pain control  - Continue to monitor closely  6/12/20: POD#2; no events o/n; sBP's 153-182 range, which is likely 2/2 pain, which he describes as abn tenderness w/ movement  6/13/20-doing well. NGT clamped. Passing some gas.     Interval History: doing well. NGT clamped by Surgery    Review of Systems   Constitutional: Negative for fever.   HENT: Negative for hearing loss.         NGT clamped     Eyes: Negative for visual disturbance.   Respiratory: Negative for cough and shortness of breath.    Cardiovascular: Negative for chest pain and palpitations.   Gastrointestinal: Positive for abdominal pain (tenderness when moving).        Painful as expected following partial bowel resection   Genitourinary: Negative for difficulty urinating, dysuria, frequency and hematuria.   Musculoskeletal: Negative for arthralgias and back pain.   Skin: Negative for color change and rash.   Neurological: Positive for weakness. Negative for seizures, syncope, light-headedness, numbness and headaches.   Hematological: Bruises/bleeds easily.   Psychiatric/Behavioral: The patient is not nervous/anxious.       Objective:     Vital Signs (Most Recent):  Temp: 98.1 °F (36.7 °C) (06/13/20 1633)  Pulse: 79 (06/13/20 1633)  Resp: 16 (06/13/20 1633)  BP: (!) 153/68 (06/13/20 1633)  SpO2: 96 % (06/13/20 1633) Vital Signs (24h Range):  Temp:  [96.5 °F (35.8 °C)-98.4 °F (36.9 °C)] 98.1 °F (36.7 °C)  Pulse:  [72-90] 79  Resp:  [10-20] 16  SpO2:  [92 %-96 %] 96 %  BP: (146-177)/(66-83) 153/68     Weight: 75.2 kg (165 lb 12.6 oz)  Body mass index is 22.48  kg/m².    Intake/Output Summary (Last 24 hours) at 6/13/2020 1819  Last data filed at 6/13/2020 1500  Gross per 24 hour   Intake 2910.32 ml   Output 2110 ml   Net 800.32 ml      Physical Exam  Constitutional:       Appearance: He is well-developed.   HENT:      Head: Normocephalic and atraumatic.   Eyes:      Extraocular Movements: EOM normal.      Conjunctiva/sclera: Conjunctivae normal.   Neck:      Musculoskeletal: Normal range of motion.      Thyroid: No thyromegaly.   Cardiovascular:      Rate and Rhythm: Normal rate and regular rhythm.   Pulmonary:      Effort: Pulmonary effort is normal. No respiratory distress.   Abdominal:      Comments: Soft, nondistended, appropriately TTP; incision c/d/i without erythema or drainage; RUQ ostomy pink and moist without stool or gas in bag   Musculoskeletal:         General: No tenderness or edema.      Comments: R foot s/p amputation   Skin:     General: Skin is warm and dry.      Capillary Refill: Capillary refill takes less than 2 seconds.      Findings: No rash.   Neurological:      Mental Status: He is alert and oriented to person, place, and time.   Psychiatric:         Mood and Affect: Mood and affect normal.         Significant Labs:   CBC:   Recent Labs   Lab 06/12/20  0528   WBC 7.64   HGB 10.0*   HCT 31.9*        CMP:   Recent Labs   Lab 06/12/20  0528      K 4.0      CO2 23   GLU 98   BUN 18   CREATININE 1.8*   CALCIUM 8.1*   PROT 6.4   ALBUMIN 3.0*   BILITOT 0.6   ALKPHOS 129   AST 14   ALT 13   ANIONGAP 8   EGFRNONAA 37*       Significant Imaging: I have reviewed all pertinent imaging results/findings within the past 24 hours.      Assessment/Plan:      * Colon adenocarcinoma  -pt seen outpatient by Dr. Yang on yesterday   -recent colonoscopy per GI on last Thursday   -options for possible surgical intervention   -Cardiology consulted   -EKG, Echo, and chest xray pending   -antiemetics as needed   -analgesia as needed      6/13/2020  Oncology input appreciated.   Patient is not interested in chemotherapy  General surgery managing postoperatively.    Pulmonary nodules  -imaging on 6/5/2020 supports 3 noncalcified pulmonary nodules, measuring between 6 and 8 mm in the middle lobe and left upper lobe.  Due to history early metastasis must be considered.  -pt to benefit from outpatient follow up and further evaluation     COPD (chronic obstructive pulmonary disease)  -Duonebs as needed   -supplemental oxygen as needed       Acute lower GI bleeding  -in the setting of adenocarcinoma   -recent colonoscopy with clips within the cecum, with wall thickening present corresponding to the colon neoplasm  -Plavix held 10 days ago by PCP   -symptoms present intermittently since 01/2020  -ASA held   -PPI   -case discussed with GI with symptoms likely due to colon adenocarcinoma and no further recommendations   -General Surgery on consult   -serial H/H in progress   -downward trend noted with PRBCs transfused x 1 unit in ED and additional unit in progress      Coronary artery disease involving native coronary artery of native heart without angina pectoris  -ASA and Plavix held due to bleeding   -will resume Coreg when appropriate   -Statin and Imdur continued   -s/p stents x 3 in 2000 and MI       Acute on chronic kidney failure  BUN/Creatinine 44/3.2   -in the setting of symptomatic anemia   -baseline 1.4-2.5  -blood transfusion in progress   -repeat CMP in am   6/10/20: Slightly improved sCr from yesterday (3.2 -> 3.0), which may slightly improve further with more blood  6/11/20: sCr further improved from 3.0>2.1 (eGFR 20>31)    PVD (peripheral vascular disease)  -hx of   -s/p fem-fem bypass graft and left SFA   -pt was followed outpatient by Dr. Jamil ([facility])  -ASA and Plavix on hold due to bleeding       Acute blood loss anemia  -in the setting of lower GI Bleed   -ASA and Plavix on hold   -H/H 7.5/24.4   -PRBCs transfused x 1 unit  with current unit in progress   -serial H/H's in progress-will continue to transfuse as needed   -supplemental oxygen as needed   H&H stable over the past 1 day; no gross BRBPR since yesterday AM; s/p 3U PRBCs  6/11/20: Hgb 8.9  -monitor closely and tfx prn    Essential hypertension  -stable   -will hold antihypertensives due to active bleed and resume as appropriate   6/10/20: Restarted a lower dose BB per card recs given BP and HR being able to tolerate given plan for surgery tomorrow  6/11/20: Higher BP's today likely partially due to pain  - Hydralazine 10 mg IV q8H prn added  - Carvedilol dose increased from 12.5 mg po bid to 25 mg po bid  6/12/20: sBPs 153-182, which is likely 2/2 POD#2 pain despite PCA pump  -Amlodipine 5 mg po qd added     Hyperlipidemia  -Statin         VTE Risk Mitigation (From admission, onward)    None                Lizy Rodriguez NP  Department of Hospital Medicine   [facility]

## 2020-06-14 PROBLEM — Z01.810 PREOP CARDIOVASCULAR EXAM: Status: RESOLVED | Noted: 2017-04-27 | Resolved: 2020-06-14

## 2020-06-14 LAB
ALBUMIN SERPL BCP-MCNC: 2.7 G/DL (ref 3.5–5.2)
ALP SERPL-CCNC: 133 U/L (ref 55–135)
ALT SERPL W/O P-5'-P-CCNC: 14 U/L (ref 10–44)
ANION GAP SERPL CALC-SCNC: 9 MMOL/L (ref 8–16)
APTT BLDCRRT: 28.6 SEC (ref 21–32)
AST SERPL-CCNC: 18 U/L (ref 10–40)
BASOPHILS # BLD AUTO: 0.02 K/UL (ref 0–0.2)
BASOPHILS # BLD AUTO: 0.03 K/UL (ref 0–0.2)
BASOPHILS NFR BLD: 0.3 % (ref 0–1.9)
BASOPHILS NFR BLD: 0.5 % (ref 0–1.9)
BILIRUB SERPL-MCNC: 0.5 MG/DL (ref 0.1–1)
BUN SERPL-MCNC: 12 MG/DL (ref 8–23)
CALCIUM SERPL-MCNC: 8.4 MG/DL (ref 8.7–10.5)
CHLORIDE SERPL-SCNC: 108 MMOL/L (ref 95–110)
CO2 SERPL-SCNC: 21 MMOL/L (ref 23–29)
CREAT SERPL-MCNC: 1.6 MG/DL (ref 0.5–1.4)
DIFFERENTIAL METHOD: ABNORMAL
DIFFERENTIAL METHOD: ABNORMAL
EOSINOPHIL # BLD AUTO: 0.3 K/UL (ref 0–0.5)
EOSINOPHIL # BLD AUTO: 0.4 K/UL (ref 0–0.5)
EOSINOPHIL NFR BLD: 4.2 % (ref 0–8)
EOSINOPHIL NFR BLD: 5.3 % (ref 0–8)
ERYTHROCYTE [DISTWIDTH] IN BLOOD BY AUTOMATED COUNT: 16.9 % (ref 11.5–14.5)
ERYTHROCYTE [DISTWIDTH] IN BLOOD BY AUTOMATED COUNT: 17.1 % (ref 11.5–14.5)
EST. GFR  (AFRICAN AMERICAN): 49 ML/MIN/1.73 M^2
EST. GFR  (NON AFRICAN AMERICAN): 43 ML/MIN/1.73 M^2
GLUCOSE SERPL-MCNC: 112 MG/DL (ref 70–110)
HCT VFR BLD AUTO: 28.9 % (ref 40–54)
HCT VFR BLD AUTO: 31.5 % (ref 40–54)
HGB BLD-MCNC: 9 G/DL (ref 14–18)
HGB BLD-MCNC: 9.4 G/DL (ref 14–18)
IMM GRANULOCYTES # BLD AUTO: 0.03 K/UL (ref 0–0.04)
IMM GRANULOCYTES # BLD AUTO: 0.04 K/UL (ref 0–0.04)
IMM GRANULOCYTES NFR BLD AUTO: 0.4 % (ref 0–0.5)
IMM GRANULOCYTES NFR BLD AUTO: 0.6 % (ref 0–0.5)
INR PPP: 1 (ref 0.8–1.2)
LYMPHOCYTES # BLD AUTO: 0.8 K/UL (ref 1–4.8)
LYMPHOCYTES # BLD AUTO: 0.9 K/UL (ref 1–4.8)
LYMPHOCYTES NFR BLD: 11.3 % (ref 18–48)
LYMPHOCYTES NFR BLD: 13.5 % (ref 18–48)
MAGNESIUM SERPL-MCNC: 2 MG/DL (ref 1.6–2.6)
MCH RBC QN AUTO: 26.9 PG (ref 27–31)
MCH RBC QN AUTO: 27 PG (ref 27–31)
MCHC RBC AUTO-ENTMCNC: 29.8 G/DL (ref 32–36)
MCHC RBC AUTO-ENTMCNC: 31.1 G/DL (ref 32–36)
MCV RBC AUTO: 87 FL (ref 82–98)
MCV RBC AUTO: 91 FL (ref 82–98)
MONOCYTES # BLD AUTO: 0.6 K/UL (ref 0.3–1)
MONOCYTES # BLD AUTO: 0.6 K/UL (ref 0.3–1)
MONOCYTES NFR BLD: 8.2 % (ref 4–15)
MONOCYTES NFR BLD: 8.5 % (ref 4–15)
NEUTROPHILS # BLD AUTO: 4.7 K/UL (ref 1.8–7.7)
NEUTROPHILS # BLD AUTO: 5.3 K/UL (ref 1.8–7.7)
NEUTROPHILS NFR BLD: 71.6 % (ref 38–73)
NEUTROPHILS NFR BLD: 75.6 % (ref 38–73)
NRBC BLD-RTO: 0 /100 WBC
NRBC BLD-RTO: 0 /100 WBC
PHOSPHATE SERPL-MCNC: 2.6 MG/DL (ref 2.7–4.5)
PLATELET # BLD AUTO: 167 K/UL (ref 150–350)
PLATELET # BLD AUTO: 173 K/UL (ref 150–350)
PMV BLD AUTO: 9.3 FL (ref 9.2–12.9)
PMV BLD AUTO: 9.6 FL (ref 9.2–12.9)
POTASSIUM SERPL-SCNC: 4.1 MMOL/L (ref 3.5–5.1)
PROT SERPL-MCNC: 6.5 G/DL (ref 6–8.4)
PROTHROMBIN TIME: 10.8 SEC (ref 9–12.5)
RBC # BLD AUTO: 3.34 M/UL (ref 4.6–6.2)
RBC # BLD AUTO: 3.48 M/UL (ref 4.6–6.2)
SODIUM SERPL-SCNC: 138 MMOL/L (ref 136–145)
WBC # BLD AUTO: 6.57 K/UL (ref 3.9–12.7)
WBC # BLD AUTO: 6.97 K/UL (ref 3.9–12.7)

## 2020-06-14 PROCEDURE — C9113 INJ PANTOPRAZOLE SODIUM, VIA: HCPCS | Mod: HCNC | Performed by: COLON & RECTAL SURGERY

## 2020-06-14 PROCEDURE — 36415 COLL VENOUS BLD VENIPUNCTURE: CPT | Mod: HCNC

## 2020-06-14 PROCEDURE — 25000003 PHARM REV CODE 250: Mod: HCNC | Performed by: COLON & RECTAL SURGERY

## 2020-06-14 PROCEDURE — 99900037 HC PT THERAPY SCREENING (STAT): Mod: HCNC

## 2020-06-14 PROCEDURE — 63600175 PHARM REV CODE 636 W HCPCS: Mod: HCNC | Performed by: NURSE PRACTITIONER

## 2020-06-14 PROCEDURE — 25000003 PHARM REV CODE 250: Mod: HCNC | Performed by: NURSE PRACTITIONER

## 2020-06-14 PROCEDURE — 99024 PR POST-OP FOLLOW-UP VISIT: ICD-10-PCS | Mod: HCNC,,, | Performed by: COLON & RECTAL SURGERY

## 2020-06-14 PROCEDURE — 63600175 PHARM REV CODE 636 W HCPCS: Mod: HCNC | Performed by: COLON & RECTAL SURGERY

## 2020-06-14 PROCEDURE — 85730 THROMBOPLASTIN TIME PARTIAL: CPT | Mod: HCNC

## 2020-06-14 PROCEDURE — 85610 PROTHROMBIN TIME: CPT | Mod: HCNC

## 2020-06-14 PROCEDURE — 99233 SBSQ HOSP IP/OBS HIGH 50: CPT | Mod: HCNC,,, | Performed by: INTERNAL MEDICINE

## 2020-06-14 PROCEDURE — 25000003 PHARM REV CODE 250: Mod: HCNC | Performed by: INTERNAL MEDICINE

## 2020-06-14 PROCEDURE — 85025 COMPLETE CBC W/AUTO DIFF WBC: CPT | Mod: HCNC

## 2020-06-14 PROCEDURE — 85730 THROMBOPLASTIN TIME PARTIAL: CPT | Mod: 91,HCNC

## 2020-06-14 PROCEDURE — 84100 ASSAY OF PHOSPHORUS: CPT | Mod: HCNC

## 2020-06-14 PROCEDURE — 99024 POSTOP FOLLOW-UP VISIT: CPT | Mod: HCNC,,, | Performed by: COLON & RECTAL SURGERY

## 2020-06-14 PROCEDURE — 99233 PR SUBSEQUENT HOSPITAL CARE,LEVL III: ICD-10-PCS | Mod: HCNC,,, | Performed by: INTERNAL MEDICINE

## 2020-06-14 PROCEDURE — 80053 COMPREHEN METABOLIC PANEL: CPT | Mod: HCNC

## 2020-06-14 PROCEDURE — 83735 ASSAY OF MAGNESIUM: CPT | Mod: HCNC

## 2020-06-14 PROCEDURE — 21400001 HC TELEMETRY ROOM: Mod: HCNC

## 2020-06-14 RX ORDER — HEPARIN SODIUM,PORCINE/D5W 25000/250
12 INTRAVENOUS SOLUTION INTRAVENOUS CONTINUOUS
Status: DISCONTINUED | OUTPATIENT
Start: 2020-06-14 | End: 2020-06-16

## 2020-06-14 RX ADMIN — HEPARIN SODIUM 12 UNITS/KG/HR: 10000 INJECTION, SOLUTION INTRAVENOUS at 05:06

## 2020-06-14 RX ADMIN — ISOSORBIDE MONONITRATE 120 MG: 60 TABLET, EXTENDED RELEASE ORAL at 08:06

## 2020-06-14 RX ADMIN — AMLODIPINE BESYLATE 5 MG: 5 TABLET ORAL at 08:06

## 2020-06-14 RX ADMIN — CARVEDILOL 25 MG: 12.5 TABLET, FILM COATED ORAL at 08:06

## 2020-06-14 RX ADMIN — HYDROMORPHONE HYDROCHLORIDE 0.4 MG: 1 INJECTION, SOLUTION INTRAMUSCULAR; INTRAVENOUS; SUBCUTANEOUS at 10:06

## 2020-06-14 RX ADMIN — PANTOPRAZOLE SODIUM 40 MG: 40 INJECTION, POWDER, LYOPHILIZED, FOR SOLUTION INTRAVENOUS at 08:06

## 2020-06-14 RX ADMIN — HYDROMORPHONE HYDROCHLORIDE 0.4 MG: 1 INJECTION, SOLUTION INTRAMUSCULAR; INTRAVENOUS; SUBCUTANEOUS at 06:06

## 2020-06-14 RX ADMIN — HYDROMORPHONE HYDROCHLORIDE 0.4 MG: 1 INJECTION, SOLUTION INTRAMUSCULAR; INTRAVENOUS; SUBCUTANEOUS at 03:06

## 2020-06-14 RX ADMIN — ATORVASTATIN CALCIUM 40 MG: 40 TABLET, FILM COATED ORAL at 08:06

## 2020-06-14 RX ADMIN — HYDROMORPHONE HYDROCHLORIDE 0.4 MG: 1 INJECTION, SOLUTION INTRAMUSCULAR; INTRAVENOUS; SUBCUTANEOUS at 09:06

## 2020-06-14 RX ADMIN — DIPHENHYDRAMINE HYDROCHLORIDE 25 MG: 25 CAPSULE ORAL at 12:06

## 2020-06-14 RX ADMIN — HYDROMORPHONE HYDROCHLORIDE 0.4 MG: 1 INJECTION, SOLUTION INTRAMUSCULAR; INTRAVENOUS; SUBCUTANEOUS at 12:06

## 2020-06-14 NOTE — ASSESSMENT & PLAN NOTE
-in the setting of adenocarcinoma   -recent colonoscopy with clips within the cecum, with wall thickening present corresponding to the colon neoplasm  -Plavix held 10 days ago by PCP   -symptoms present intermittently since 01/2020  -ASA held   -PPI   -case discussed with GI with symptoms likely due to colon adenocarcinoma and no further recommendations   -General Surgery on consult   -serial H/H in progress   -downward trend noted with PRBCs transfused x 1 unit in ED and additional unit in progress  6/14/20  Stable. Will plan to start heparin sub q if okay with Gigila. Continue to hold ASA and Plavix

## 2020-06-14 NOTE — PROGRESS NOTES
[facility] Hospital Medicine  Progress Note    Patient Name: Kodi Ribeiro  MRN: [id number]  Patient Class: IP- Inpatient   Admission Date: 6/8/2020  Length of Stay: 5 days  Attending Physician: Mimi Marshall MD  Primary Care Provider: Norma Nuñez MD        Subjective:     Principal Problem:Colon adenocarcinoma    HPI:  Kodi Ribeiro is a 70 y.o. male patient with a PMHx of CVA, CHF, COPD, CAD, diverticulosis, GERD, PVD, hyperglycemia, HLD, HTN, Anemia, and MI who presents to the Emergency Department for evaluation of rectal bleeding which onset gradually earlier today. Pt reports having had a bloody stool this morning. Pt states that he was on Plavix, but was stopped by his PCP 10 days ago when he had similar sxs but has continued to take ASA. Pt had a hx of hematochezia since 01/2020 and had colonoscopy last Tuesday with colon cancer diagnosis. Pt seen by Dr. Wilkerson (General Surgery) as outpatient yesterday with possible options for surgical intervention discussed.  Symptoms are intermittent and moderate in severity. No mitigating or exacerbating factors reported. Associated sxs include constipation, hematochezia, abdominal pain when making a BM, diaphoresis, chills, and syncope.  Patient denies any CP, hematuria, N/V, SOB, light-headedness, and all other sxs at this time. No prior Tx reported. No further complaints or concerns at this time.  Pt denies smoking and use of ETOH.  Pt is a full code and Laury STEWARD'Connnor (wife) at [phone] is the surrogate decision maker.  ER work up showed: H/H 7.5/24.4, CO2 16, Ca 8.2, BUN/Creatinine 44/3.2, Glucose 159, and Albumin 3.3.  CT abdomen/pelvis performed and CT of chest/abdomen/pelvis results on 6/5/2020 reviewed.  ER discussed case with GI and General Surgery.  Hospital Medicine contacted for admission with patient placed in Observation for further evaluation.      Overview/Hospital Course:  6/10/20: No events; Last BM was yesterday AM  (BRBPR), no BM since, which he attributes to being NPO, but today was on clears and is NPO after MN for scheduled general surgery for possible resection of adenocarcinoma in AM. No cp or sob.  - Ordered 2 more units RBCs (completed a total of 3U yesterday).   6/11/20: s/p partial bowel resection with colostomy; tolerated surgery well without obvious intra-operative complications  - Pain control  - Continue to monitor closely  6/12/20: POD#2; no events o/n; sBP's 153-182 range, which is likely 2/2 pain, which he describes as abn tenderness w/ movement  6/13/20-doing well. NGT clamped. Passing some gas.   As of 6/14/2020 NGT removed, but will remain NPO except some ice chips. Will place order for PT/OT to encourage mobility. Creatinine continues to trend down. No gas noted from colostomy.     Interval History: NGT removed today; no complaints. Ready for PT today.    Review of Systems   Constitutional: Negative for fever.   HENT: Negative for hearing loss.         NGT clamped     Eyes: Negative for visual disturbance.   Respiratory: Negative for cough and shortness of breath.    Cardiovascular: Negative for chest pain and palpitations.   Gastrointestinal: Positive for abdominal pain (tenderness when moving).        Painful as expected following partial bowel resection   Genitourinary: Negative for difficulty urinating, dysuria, frequency and hematuria.   Musculoskeletal: Negative for arthralgias and back pain.   Skin: Negative for color change and rash.   Neurological: Positive for weakness. Negative for seizures, syncope, light-headedness, numbness and headaches.   Hematological: Bruises/bleeds easily.   Psychiatric/Behavioral: The patient is not nervous/anxious.       Objective:     Vital Signs (Most Recent):  Temp: 97.9 °F (36.6 °C) (06/14/20 1212)  Pulse: 66 (06/14/20 1212)  Resp: 17 (06/14/20 1212)  BP: (!) 143/67 (06/14/20 1212)  SpO2: 96 % (06/14/20 1212) Vital Signs (24h Range):  Temp:  [97.8 °F (36.6 °C)-98.7  °F (37.1 °C)] 97.9 °F (36.6 °C)  Pulse:  [66-86] 66  Resp:  [16-18] 17  SpO2:  [92 %-97 %] 96 %  BP: (143-180)/(67-86) 143/67     Weight: 75.2 kg (165 lb 12.6 oz)  Body mass index is 22.48 kg/m².    Intake/Output Summary (Last 24 hours) at 6/14/2020 1437  Last data filed at 6/14/2020 1000  Gross per 24 hour   Intake 1503.75 ml   Output 2120 ml   Net -616.25 ml      Physical Exam  Constitutional:       Appearance: He is well-developed.   HENT:      Head: Normocephalic and atraumatic.   Eyes:      Extraocular Movements: EOM normal.      Conjunctiva/sclera: Conjunctivae normal.   Neck:      Musculoskeletal: Normal range of motion.      Thyroid: No thyromegaly.   Cardiovascular:      Rate and Rhythm: Normal rate and regular rhythm.   Pulmonary:      Effort: Pulmonary effort is normal. No respiratory distress.   Abdominal:      Comments: Soft, nondistended, appropriately TTP; incision c/d/i without erythema or drainage; RUQ ostomy pink and moist without stool or gas in bag   Musculoskeletal:         General: No tenderness or edema.      Comments: R foot s/p amputation   Skin:     General: Skin is warm and dry.      Capillary Refill: Capillary refill takes less than 2 seconds.      Findings: No rash.   Neurological:      Mental Status: He is alert and oriented to person, place, and time.   Psychiatric:         Mood and Affect: Mood and affect normal.       Significant Labs:   CBC:   Recent Labs   Lab 06/14/20  0524   WBC 6.97   HGB 9.4*   HCT 31.5*        CMP:   Recent Labs   Lab 06/14/20  0524      K 4.1      CO2 21*   *   BUN 12   CREATININE 1.6*   CALCIUM 8.4*   PROT 6.5   ALBUMIN 2.7*   BILITOT 0.5   ALKPHOS 133   AST 18   ALT 14   ANIONGAP 9   EGFRNONAA 43*       Significant Imaging: I have reviewed all pertinent imaging results/findings within the past 24 hours.      Assessment/Plan:      * Colon adenocarcinoma  -pt seen outpatient by Dr. Yang on yesterday   -recent colonoscopy per GI on  last Thursday   -options for possible surgical intervention   -Cardiology consulted   -EKG, Echo, and chest xray pending   -antiemetics as needed   -analgesia as needed     6/13/2020  Oncology input appreciated.   Patient is not interested in chemotherapy  General surgery managing postoperatively.    6/14/20  General surgery managing.   NGT has been removed.    Pulmonary nodules  -imaging on 6/5/2020 supports 3 noncalcified pulmonary nodules, measuring between 6 and 8 mm in the middle lobe and left upper lobe.  Due to history early metastasis must be considered.  -pt to benefit from outpatient follow up and further evaluation     COPD (chronic obstructive pulmonary disease)  -Duonebs as needed   -supplemental oxygen as needed       Acute lower GI bleeding  -in the setting of adenocarcinoma   -recent colonoscopy with clips within the cecum, with wall thickening present corresponding to the colon neoplasm  -Plavix held 10 days ago by PCP   -symptoms present intermittently since 01/2020  -ASA held   -PPI   -case discussed with GI with symptoms likely due to colon adenocarcinoma and no further recommendations   -General Surgery on consult   -serial H/H in progress   -downward trend noted with PRBCs transfused x 1 unit in ED and additional unit in progress  6/14/20  Stable. Will plan to start heparin sub q if okay with Gigila. Continue to hold ASA and Plavix      Coronary artery disease involving native coronary artery of native heart without angina pectoris  -ASA and Plavix held due to bleeding   -will resume Coreg when appropriate   -Statin and Imdur continued   -s/p stents x 3 in 2000 and MI       Acute on chronic kidney failure  BUN/Creatinine 44/3.2   -in the setting of symptomatic anemia   -baseline 1.4-2.5  -blood transfusion in progress   -repeat CMP in am   6/10/20: Slightly improved sCr from yesterday (3.2 -> 3.0), which may slightly improve further with more blood  6/11/20: sCr further improved from 3.0>2.1  (eGFR 20>31)  6/14/20 continues to improve    PVD (peripheral vascular disease)  -hx of   -s/p fem-fem bypass graft and left SFA   -pt was followed outpatient by Dr. Jamil ([facility] Surgery)  -ASA and Plavix on hold due to bleeding       Acute blood loss anemia  -in the setting of lower GI Bleed   -ASA and Plavix on hold   -H/H 7.5/24.4   -PRBCs transfused x 1 unit with current unit in progress   -serial H/H's in progress-will continue to transfuse as needed   -supplemental oxygen as needed   H&H stable over the past 1 day; no gross BRBPR since yesterday AM; s/p 3U PRBCs  6/11/20: Hgb 8.9  -monitor closely and tfx prn  6/14/20  stable    Essential hypertension  -stable   -will hold antihypertensives due to active bleed and resume as appropriate   6/10/20: Restarted a lower dose BB per card recs given BP and HR being able to tolerate given plan for surgery tomorrow  6/11/20: Higher BP's today likely partially due to pain  - Hydralazine 10 mg IV q8H prn added  - Carvedilol dose increased from 12.5 mg po bid to 25 mg po bid  6/12/20: sBPs 153-182, which is likely 2/2 POD#2 pain despite PCA pump  -Amlodipine 5 mg po qd added     6/14/20  Still slightly elevated; expected to decrease once fluids are discontinued.    Hyperlipidemia  -Statin         VTE Risk Mitigation (From admission, onward)    None        Lizy Rodriguez NP  Department of Hospital Medicine   [facility]

## 2020-06-14 NOTE — ASSESSMENT & PLAN NOTE
BUN/Creatinine 44/3.2   -in the setting of symptomatic anemia   -baseline 1.4-2.5  -blood transfusion in progress   -repeat CMP in am   6/10/20: Slightly improved sCr from yesterday (3.2 -> 3.0), which may slightly improve further with more blood  6/11/20: sCr further improved from 3.0>2.1 (eGFR 20>31)  6/14/20 continues to improve

## 2020-06-14 NOTE — ASSESSMENT & PLAN NOTE
-pt seen outpatient by Dr. Yang on yesterday   -recent colonoscopy per GI on last Thursday   -options for possible surgical intervention   -Cardiology consulted   -EKG, Echo, and chest xray pending   -antiemetics as needed   -analgesia as needed     6/13/2020  Oncology input appreciated.   Patient is not interested in chemotherapy  General surgery managing postoperatively.    6/14/20  General surgery managing.   NGT has been removed.

## 2020-06-14 NOTE — ASSESSMENT & PLAN NOTE
-stable   -will hold antihypertensives due to active bleed and resume as appropriate   6/10/20: Restarted a lower dose BB per card recs given BP and HR being able to tolerate given plan for surgery tomorrow  6/11/20: Higher BP's today likely partially due to pain  - Hydralazine 10 mg IV q8H prn added  - Carvedilol dose increased from 12.5 mg po bid to 25 mg po bid  6/12/20: sBPs 153-182, which is likely 2/2 POD#2 pain despite PCA pump  -Amlodipine 5 mg po qd added     6/14/20  Still slightly elevated; expected to decrease once fluids are discontinued.

## 2020-06-14 NOTE — PLAN OF CARE
Remains injury free. Pain managed with IV Dilaudid. NGT removed per MD, tolerated well. Colostomy bag with small amount liquid drainage and gas. Remains NPO. Repositions per self. Will monitor.

## 2020-06-14 NOTE — SUBJECTIVE & OBJECTIVE
Interval History: No acute events overnight. Remains HD stable. Low residuals on NGT clamp trial. Some gas and minor amount of stool in ostomy bag.     Medications:  Continuous Infusions:   dextrose 5 % and 0.45 % NaCl 75 mL/hr at 06/13/20 2333     Scheduled Meds:   amLODIPine  5 mg Oral Daily    atorvastatin  40 mg Oral Daily    carvediloL  25 mg Oral BID    isosorbide mononitrate  120 mg Oral Daily    nozaseptin   Each Nostril BID    pantoprazole  40 mg Intravenous Daily     PRN Meds:sodium chloride, sodium chloride, sodium chloride, sodium chloride, sodium chloride, acetaminophen, albuterol-ipratropium, diphenhydrAMINE, hydrALAZINE, HYDROmorphone, naloxone, ondansetron, promethazine (PHENERGAN) IVPB     Review of patient's allergies indicates:   Allergen Reactions    Morphine Itching     Objective:     Vital Signs (Most Recent):  Temp: 98 °F (36.7 °C) (06/14/20 0734)  Pulse: 78 (06/14/20 0734)  Resp: 18 (06/14/20 0734)  BP: (!) 180/86 (06/14/20 0734)  SpO2: 95 %(nurse obtianed) (06/14/20 0800) Vital Signs (24h Range):  Temp:  [97.7 °F (36.5 °C)-98.7 °F (37.1 °C)] 98 °F (36.7 °C)  Pulse:  [69-86] 78  Resp:  [16-18] 18  SpO2:  [92 %-97 %] 95 %  BP: (146-180)/(66-86) 180/86     Weight: 75.2 kg (165 lb 12.6 oz)  Body mass index is 22.48 kg/m².    Intake/Output - Last 3 Shifts       06/12 0700 - 06/13 0659 06/13 0700 - 06/14 0659 06/14 0700 - 06/15 0659    P.O.  0 0    I.V. (mL/kg) 2578.6 (34.3) 1514.8 (20.1)     Blood 0      Other 0      Total Intake(mL/kg) 2578.6 (34.3) 1514.8 (20.1) 0 (0)    Urine (mL/kg/hr) 2550 (1.4) 2350 (1.3)     Drains 250 60 0    Stool 0  10    Blood       Total Output 2800 2410 10    Net -221.4 -895.3 -10                 Physical Exam  Constitutional:       Appearance: He is well-developed.   HENT:      Head: Normocephalic and atraumatic.   Eyes:      Extraocular Movements: EOM normal.      Conjunctiva/sclera: Conjunctivae normal.   Neck:      Musculoskeletal: Normal range of motion.       Thyroid: No thyromegaly.   Cardiovascular:      Rate and Rhythm: Normal rate and regular rhythm.   Pulmonary:      Effort: Pulmonary effort is normal. No respiratory distress.   Abdominal:      Comments: Soft, nondistended, appropriately TTP; incision c/d/i without erythema or drainage; RUQ ostomy pink and moist with minor amount of stool and gas in bag   Musculoskeletal:         General: No tenderness or edema.      Comments: R foot s/p amputation   Skin:     General: Skin is warm and dry.      Capillary Refill: Capillary refill takes less than 2 seconds.      Findings: No rash.   Neurological:      Mental Status: He is alert and oriented to person, place, and time.   Psychiatric:         Mood and Affect: Mood and affect normal.         Significant Labs:  CBC:   Recent Labs   Lab 06/14/20 0524   WBC 6.97   RBC 3.48*   HGB 9.4*   HCT 31.5*      MCV 91   MCH 27.0   MCHC 29.8*     BMP:   Recent Labs   Lab 06/14/20 0523 06/14/20  0524   GLU  --  112*   NA  --  138   K  --  4.1   CL  --  108   CO2  --  21*   BUN  --  12   CREATININE  --  1.6*   CALCIUM  --  8.4*   MG 2.0  --

## 2020-06-14 NOTE — ASSESSMENT & PLAN NOTE
71yo M with multiple medical comorbidities who presents with transverse vs descending colonic adenocarcinoma with possible metastatic lung disease with acute lower GI bleed, recurrent who is now s/p open partial transverse colectomy with end proximal transverse colostomy on 6/11/2020    - NGT removed at bedside this AM  - cont NPO for now to assess after NGT removal but okay for sips of liquids and ice chips  - cont IVF  - cont ostomy nurse evaluation/education  - okay to trial hep gtt prior to reinitiation of PO anticoagulation given previous LGIB to ensure this does not start again, discussed with primary team  - OOB encouraged  - PPx: lvnx, ppi

## 2020-06-14 NOTE — PROGRESS NOTES
[facility]  Colorectal Surgery  Progress Note    Subjective:     History of Present Illness:  70 y.o. male well known to me who is admitted to the hospital with a LGIB. Pt has known colonic adenocarcinoma which is likely his source of bleeding, as he had colonoscopy last week showing other polyps as well as known carcinoma. Pt has a PMHx significant for anemia, GERD, HTN, HLD, CKD, CHF, CVA, CAD and PVD with previous AAA repair who recently underwent a colonoscopy on 06/02/2020 where a malignant-appearing mass was seen in the transverse colon, possible descending colon and biopsied.  Biopsies did confirm this to be adenocarcinoma.  Patient states that he had been having both hematochezia and melena since January intermittently. Patient's last colonoscopy prior to this 1 was in February 2014.  Patient has significant past surgical history including multiple previous abdominal operations including AAA repair, AAA graft excision and right ax fem bypass with small-bowel resection.  He denies current fever, chills, nausea, vomiting.  Patient reports a significant past coronary history requiring stent placement and likely need for further stent/CABG but is unable to undergo at this time due to high risk nature and has chosen medical therapy. Surgery consulted for evaluation of colonic adenoCA with bleeding.    Post-Op Info:  Procedure(s) (LRB):  COLECTOMY, PARTIAL (N/A)  BLOCK, TRANSVERSUS ABDOMINIS PLANE (N/A)  LYSIS, ADHESIONS (N/A)  CREATION, COLOSTOMY (N/A)  OMENTECTOMY (N/A)   3 Days Post-Op     Interval History: No acute events overnight. Remains HD stable. Low residuals on NGT clamp trial. Some gas and minor amount of stool in ostomy bag.     Medications:  Continuous Infusions:   dextrose 5 % and 0.45 % NaCl 75 mL/hr at 06/13/20 6190     Scheduled Meds:   amLODIPine  5 mg Oral Daily    atorvastatin  40 mg Oral Daily    carvediloL  25 mg Oral BID    isosorbide mononitrate  120 mg Oral Daily     nozaseptin   Each Nostril BID    pantoprazole  40 mg Intravenous Daily     PRN Meds:sodium chloride, sodium chloride, sodium chloride, sodium chloride, sodium chloride, acetaminophen, albuterol-ipratropium, diphenhydrAMINE, hydrALAZINE, HYDROmorphone, naloxone, ondansetron, promethazine (PHENERGAN) IVPB     Review of patient's allergies indicates:   Allergen Reactions    Morphine Itching     Objective:     Vital Signs (Most Recent):  Temp: 98 °F (36.7 °C) (06/14/20 0734)  Pulse: 78 (06/14/20 0734)  Resp: 18 (06/14/20 0734)  BP: (!) 180/86 (06/14/20 0734)  SpO2: 95 %(nurse obtianed) (06/14/20 0800) Vital Signs (24h Range):  Temp:  [97.7 °F (36.5 °C)-98.7 °F (37.1 °C)] 98 °F (36.7 °C)  Pulse:  [69-86] 78  Resp:  [16-18] 18  SpO2:  [92 %-97 %] 95 %  BP: (146-180)/(66-86) 180/86     Weight: 75.2 kg (165 lb 12.6 oz)  Body mass index is 22.48 kg/m².    Intake/Output - Last 3 Shifts       06/12 0700 - 06/13 0659 06/13 0700 - 06/14 0659 06/14 0700 - 06/15 0659    P.O.  0 0    I.V. (mL/kg) 2578.6 (34.3) 1514.8 (20.1)     Blood 0      Other 0      Total Intake(mL/kg) 2578.6 (34.3) 1514.8 (20.1) 0 (0)    Urine (mL/kg/hr) 2550 (1.4) 2350 (1.3)     Drains 250 60 0    Stool 0  10    Blood       Total Output 2800 2410 10    Net -221.4 -895.3 -10                 Physical Exam  Constitutional:       Appearance: He is well-developed.   HENT:      Head: Normocephalic and atraumatic.   Eyes:      Extraocular Movements: EOM normal.      Conjunctiva/sclera: Conjunctivae normal.   Neck:      Musculoskeletal: Normal range of motion.      Thyroid: No thyromegaly.   Cardiovascular:      Rate and Rhythm: Normal rate and regular rhythm.   Pulmonary:      Effort: Pulmonary effort is normal. No respiratory distress.   Abdominal:      Comments: Soft, nondistended, appropriately TTP; incision c/d/i without erythema or drainage; RUQ ostomy pink and moist with minor amount of stool and gas in bag   Musculoskeletal:         General: No tenderness  or edema.      Comments: R foot s/p amputation   Skin:     General: Skin is warm and dry.      Capillary Refill: Capillary refill takes less than 2 seconds.      Findings: No rash.   Neurological:      Mental Status: He is alert and oriented to person, place, and time.   Psychiatric:         Mood and Affect: Mood and affect normal.         Significant Labs:  CBC:   Recent Labs   Lab 06/14/20 0524   WBC 6.97   RBC 3.48*   HGB 9.4*   HCT 31.5*      MCV 91   MCH 27.0   MCHC 29.8*     BMP:   Recent Labs   Lab 06/14/20 0523 06/14/20 0524   GLU  --  112*   NA  --  138   K  --  4.1   CL  --  108   CO2  --  21*   BUN  --  12   CREATININE  --  1.6*   CALCIUM  --  8.4*   MG 2.0  --          Assessment/Plan:     * Colon adenocarcinoma  71yo M with multiple medical comorbidities who presents with transverse vs descending colonic adenocarcinoma with possible metastatic lung disease with acute lower GI bleed, recurrent who is now s/p open partial transverse colectomy with end proximal transverse colostomy on 6/11/2020    - NGT removed at bedside this AM  - cont NPO for now to assess after NGT removal but okay for sips of liquids and ice chips  - cont IVF  - cont ostomy nurse evaluation/education  - okay to trial hep gtt prior to reinitiation of PO anticoagulation given previous LGIB to ensure this does not start again, discussed with primary team  - OOB encouraged  - PPx: lvnx, ppi    COPD (chronic obstructive pulmonary disease)  Management per primary team    Acute lower GI bleeding  See plan for colon adenocarcinoma    Coronary artery disease involving native coronary artery of native heart without angina pectoris  Management per primary team    Acute on chronic kidney failure  Management per primary team    PVD (peripheral vascular disease)  Management per primary team    Acute blood loss anemia  Management per primary team    Essential hypertension  Management per primary team    Hyperlipidemia  Management per  primary team        Leonardo Yang MD  Colorectal Surgery  [facility]

## 2020-06-14 NOTE — PLAN OF CARE
Chart reviewed, pt resting in bed with call light and personal belongings within reach. Vitals stable, heart monitor 8620. NPO with sips with meds and ice chips. Tolerating well. NG tube clamped, no residual noted, ph at 4.5. Nose free of any breakdown. IV fluid infusing at 75ml/hr. Pain controlled with ordered meds in MAR.  Colostomy intact. Abdominal surgical incision dressing intact with drainage marked.  Pt turns independently. No new complains of n/v  Pt absent of injury throughout shift, will continue to monitor.

## 2020-06-14 NOTE — CONSULTS
Consult Note  Hematology/Oncology    Consult Requested By: Mimi Marshall MD    Reason for Consult:  Colonic adenocarcinoma    SUBJECTIVE:     History of Present Illness:  Patient is a 70-year-old male with past medical history significant for chronic kidney disease, CHF, CVA, coronary artery disease, hypertension, hyperlipidemia, PVD with previous AAA repair who presented with complaints of hematochezia and melena since January intermittently.  He  recently underwent colonoscopy 06/02/2020 that showed malignant-appearing mass in the transverse colon and possibly descending colon.  The above masses were biopsied and confirmed adenocarcinoma.  Who CT abdomen and pelvis showed stable reticulonodular opacity within the right lower lobe otherwise stable abdomen and pelvis.  Oncology was consulted given the new diagnosis of colonic adenocarcinoma.    Examined patient this morning, he denies any significant abdominal pain.  Denies nausea or vomiting.  Status post open partial transverse colectomy with end proximal transverse colostomy.    Interval history:  Resting in his bed in no apparent distress.  Complains of intermittent abdominal pain from recent surgery.    Continuous Infusions:   dextrose 5 % and 0.45 % NaCl 75 mL/hr at 06/13/20 2333    heparin (porcine) in D5W       Scheduled Meds:   amLODIPine  5 mg Oral Daily    atorvastatin  40 mg Oral Daily    carvediloL  25 mg Oral BID    isosorbide mononitrate  120 mg Oral Daily    nozaseptin   Each Nostril BID    pantoprazole  40 mg Intravenous Daily     PRN Meds:sodium chloride, sodium chloride, sodium chloride, sodium chloride, sodium chloride, acetaminophen, albuterol-ipratropium, diphenhydrAMINE, hydrALAZINE, HYDROmorphone, naloxone, ondansetron, promethazine (PHENERGAN) IVPB    Past Medical History:   Diagnosis Date    Acute on chronic congestive heart failure 1/13/2020    Acute respiratory failure with hypoxia 1/14/2020    Analgesic nephropathy      Anemia     AP (angina pectoris) 1/11/2019    Arthritis     Colon polyp     Repeat colonoscopy due in 9/14    COPD exacerbation 2/6/2020    Coronary artery disease     Diverticulosis     colonoscopy 2/21/2014    Dysthymia 2/13/2020    Encounter for blood transfusion     GERD (gastroesophageal reflux disease)     Hemorrhoids     colonoscopy 2/21/2014    Horseshoe kidney     Hyperglycemia 3/17/2014    Hyperlipidemia     Hypertension     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    Late complications of amputation stump     rseolved with further amputation( MRSA then none since 2014)    Lipoma of colon     colonoscopy 2/21/2014    Myocardial infarction     per patient 2000 & 9/2012    Peripheral vascular disease     Phantom limb syndrome     patient reports only intermittent not problematic, not worsening    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    Spinal cord disease     L4L5 disc    Stroke     Tobacco dependence     resolved    Ureteral stent retained      Past Surgical History:   Procedure Laterality Date    ABDOMINAL AORTIC ANEURYSM REPAIR      ABDOMINAL AORTIC ANEURYSM REPAIR  1996/2014    AMPUTATION, LOWER LIMB      AORTA - BILATERAL FEMORAL ARTERY BYPASS GRAFT  2014    Left and right leg    COLONOSCOPY N/A 6/2/2020    Procedure: COLONOSCOPY;  Surgeon: Rosanna Harrington MD;  Location: [facility];  Service: Endoscopy;  Laterality: N/A;    COLOSTOMY N/A 6/11/2020    Procedure: CREATION, COLOSTOMY;  Surgeon: Leonardo Yang MD;  Location: [facility] OR;  Service: General;  Laterality: N/A;    CORONARY ANGIOPLASTY WITH STENT PLACEMENT  2000    Three placed in heart    CYSTOSCOPY W/ RETROGRADES Left 5/29/2018    Procedure: CYSTOSCOPY, WITH RETROGRADE PYELOGRAM;  Surgeon: Scooter Jin IV, MD;  Location: [facility] OR;  Service: Urology;  Laterality: Left;    CYSTOSCOPY W/ URETERAL STENT PLACEMENT Left 5/29/2018    Procedure: CYSTOSCOPY, WITH URETERAL STENT INSERTION;  Surgeon: Scooter MENESES  Davin NELSON MD;  Location: [facility] OR;  Service: Urology;  Laterality: Left;    CYSTOSCOPY W/ URETERAL STENT PLACEMENT Left 2/4/2020    Procedure: CYSTOSCOPY, WITH URETERAL STENT INSERTION;  Surgeon: Scooter Jin IV, MD;  Location: [facility] OR;  Service: Urology;  Laterality: Left;    CYSTOSCOPY W/ URETERAL STENT REMOVAL Left 5/29/2018    Procedure: CYSTOSCOPY, WITH URETERAL STENT REMOVAL;  Surgeon: Scooter Jin IV, MD;  Location: [facility] OR;  Service: Urology;  Laterality: Left;    CYSTOSCOPY W/ URETERAL STENT REMOVAL Left 2/4/2020    Procedure: CYSTOSCOPY, WITH URETERAL STENT REMOVAL;  Surgeon: Scooter Jin IV, MD;  Location: [facility] OR;  Service: Urology;  Laterality: Left;    ESOPHAGOGASTRODUODENOSCOPY N/A 6/2/2020    Procedure: EGD (ESOPHAGOGASTRODUODENOSCOPY);  Surgeon: Rosanna Harrington MD;  Location: [facility] ENDO;  Service: Endoscopy;  Laterality: N/A;    FOOT AMPUTATION THROUGH METATARSAL  1996    left    FOOT SURGERY Bilateral 1980's    per patient multiple toe amputations prior to.  partial foot amputation:first great toe then other toes     INJECTION OF ANESTHETIC AGENT INTO TISSUE PLANE DEFINED BY TRANSVERSUS ABDOMINIS MUSCLE N/A 6/11/2020    Procedure: BLOCK, TRANSVERSUS ABDOMINIS PLANE;  Surgeon: Leonardo Yang MD;  Location: [facility] OR;  Service: General;  Laterality: N/A;    KIDNEY SURGERY  2014    per patient separation of horseshoe kidney @ time of AAA repair    LEFT HEART CATHETERIZATION Left 3/7/2019    Procedure: CATHETERIZATION, HEART, LEFT;  Surgeon: Adriel Boone MD;  Location: [facility] CATH LAB;  Service: Cardiology;  Laterality: Left;  630 admit for IV hydration  10am start    LUNG LOBECTOMY Right 1970s    per patient not cancer    LYSIS OF ADHESIONS N/A 6/11/2020    Procedure: LYSIS, ADHESIONS;  Surgeon: Leonardo Yang MD;  Location: [facility] OR;  Service: General;  Laterality: N/A;    OMENTECTOMY N/A 6/11/2020    Procedure: OMENTECTOMY;  Surgeon: Leonardo Yang MD;   Location: [facility] OR;  Service: General;  Laterality: N/A;    right below knee amputation   (approx)    SMALL INTESTINE SURGERY  2014    per patient partial @ time of aaa repair  not small bowel - large bowel bowel compromised byvidhiwe AAAbowel    SUBTOTAL COLECTOMY N/A 2020    Procedure: COLECTOMY, PARTIAL;  Surgeon: Leonardo Yang MD;  Location: [facility] OR;  Service: General;  Laterality: N/A;    TONSILLECTOMY   aprox    URETERAL STENT PLACEMENT Left     annually replaced since  or so  Dr Jin     Family History   Problem Relation Age of Onset    Cancer Mother         lung    COPD Mother     Heart disease Father         MI but per patient bc of old age    Diabetes Daughter     Eczema Neg Hx     Lupus Neg Hx     Psoriasis Neg Hx     Melanoma Neg Hx     Kidney disease Neg Hx     Stroke Neg Hx     Mental illness Neg Hx     Mental retardation Neg Hx     Hypertension Neg Hx     Hyperlipidemia Neg Hx     Drug abuse Neg Hx     Alcohol abuse Neg Hx     Depression Neg Hx      Social History     Tobacco Use    Smoking status: Former Smoker     Packs/day: 1.00     Years: 15.00     Pack years: 15.00     Quit date: 2009     Years since quittin.4    Smokeless tobacco: Never Used   Substance Use Topics    Alcohol use: No    Drug use: No     Comment: Is on prescription opiod, no non prescribed use       Review of patient's allergies indicates:   Allergen Reactions    Morphine Itching        Review of Systems:  Constitutional: Negative for fever.   HENT: Negative for hearing loss.    Eyes: Negative for visual disturbance.   Respiratory: Negative for cough and shortness of breath.    Cardiovascular: Negative for chest pain and palpitations.   Gastrointestinal:  Negative for abdominal pain  Genitourinary: Negative for difficulty urinating, dysuria, frequency and hematuria.   Musculoskeletal: Negative for arthralgias and back pain.   Skin: Negative for color change and rash.    Neurological: Positive for weakness. Negative for seizures, syncope, light-headedness, numbness and headaches.   Hematological: Bruises/bleeds easily.   Psychiatric/Behavioral: The patient is not nervous/anxious.        OBJECTIVE:     Vital Signs (Most Recent)  Temp: 97.9 °F (36.6 °C) (06/14/20 1212)  Pulse: 66 (06/14/20 1212)  Resp: 17 (06/14/20 1212)  BP: (!) 143/67 (06/14/20 1212)  SpO2: 96 % (06/14/20 1212)    Pain Assessment: No pain reported at this time    Vital Signs Range (Last 24H):  Temp:  [97.8 °F (36.6 °C)-98.7 °F (37.1 °C)]   Pulse:  [66-86]   Resp:  [16-18]   BP: (143-180)/(67-86)   SpO2:  [92 %-97 %]     Physical Exam:  Constitutional:       Appearance: He is well-developed.   HENT:      Head: Normocephalic and atraumatic.   Eyes:      Extraocular Movements: EOM normal.      Conjunctiva/sclera: Conjunctivae normal.   Neck:      Musculoskeletal: Normal range of motion.      Thyroid: No thyromegaly.   Cardiovascular:      Rate and Rhythm: Normal rate and regular rhythm.   Pulmonary:      Effort: Pulmonary effort is normal. No respiratory distress.   Abdominal:      Comments: Soft, nondistended, RUQ ostomy pink and moist without stool or gas in bag   Musculoskeletal:         General: No tenderness or edema.      Comments: R foot s/p amputation   Skin:     General: Skin is warm and dry.      Capillary Refill: Capillary refill takes less than 2 seconds.      Findings: No rash.   Neurological:      Mental Status: He is alert and oriented to person, place, and time.   Psychiatric:         Mood and Affect: Mood and affect normal.       Laboratory:  CBC with Differential:  Recent Labs   Lab 06/14/20  1522   WBC 6.57   LYMPH 13.5*  0.9*   BASOPHIL 0.5   RBC 3.34*   HCT 28.9*   HGB 9.0*   MCV 87   MCH 26.9*   RDW 16.9*      MPV 9.6     CMP:  Recent Labs   Lab 06/14/20  0524   *   CALCIUM 8.4*   ALBUMIN 2.7*   PROT 6.5      K 4.1   CO2 21*      BUN 12   CREATININE 1.6*   ALKPHOS 133    ALT 14   AST 18   BILITOT 0.5     BMP:   Recent Labs   Lab 06/14/20  0524   *   CALCIUM 8.4*      K 4.1   CO2 21*      BUN 12   CREATININE 1.6*     LFTs:   Recent Labs   Lab 06/14/20  0524   ALT 14   AST 18   ALKPHOS 133   BILITOT 0.5   PROT 6.5   ALBUMIN 2.7*     Haptoglobin: No results for input(s): HAPTOGLOBIN in the last 24 hours.  Tumor Markers: No results for input(s): PSA, CEA, , AFPTM, [id number],  in the last 24 hours.    Invalid input(s): ALGTM  Immunology: No results for input(s): SPEP, HILL, AMPARO, FREELAMBDALI in the last 24 hours.  Coagulation:   Recent Labs   Lab 06/14/20  1522   INR 1.0   APTT 28.6     Specimen (12h ago, onward)    None        Microbiology Results (last 7 days)     ** No results found for the last 168 hours. **          Diagnostic Results:      ASSESSMENT/PLAN:     Patient Active Problem List   Diagnosis    Hiatal hernia with GERD    Hyperlipidemia    Essential hypertension    Foot ulcer - Left Foot    Idiopathic peripheral neuropathy    Acute blood loss anemia    PVD (peripheral vascular disease)    CKD (chronic kidney disease) stage 4, GFR 15-29 ml/min    Horseshoe kidney    CAD;Old MI ; s/p stents x 3 (2000)     Aortic stenosis    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    Ureteral stricture, left    Left carotid artery stenosis    Ureteral stricture    History of transmetatarsal amputation of left foot    Iron deficiency anemia due to chronic blood loss    Persistent proteinuria    CKD stage G4/A3, GFR 15-29 and albumin creatinine ratio >300 mg/g    Elevated serum immunoglobulin free light chains    Unstable angina    Acute on chronic kidney failure    Hypomagnesemia    Renal dysfunction    Hydronephrosis of left kidney    Coronary artery disease involving native coronary artery of native heart without angina pectoris    Chronic respiratory failure with hypoxia    Chest pain, midsternal, H/O known CAD      Centrilobular emphysema    Follow up    Dysthymia    LISA (iron deficiency anemia)    Acute lower GI bleeding    COPD (chronic obstructive pulmonary disease)    Colon adenocarcinoma    Pulmonary nodules         Plan    # questionable metastatic colon adenocarcinoma:  -status post open partial transverse colectomy with end proximal transverse colostomy on 06/11/2020.  -postsurgical management per surgery service.  -discussed with patient the natural history and prognosis of metastatic colonic adenocarcinoma.  Patient remains adamant about chemotherapy.  -recommend outpatient follow-up for further discussion.    # normocytic anemia:  -noted hemoglobin at 9.0 grams/deciliter with hematocrit at 28.9.  Normal MCV.  -bleeding has significantly improved per patient.  -will continue to monitor.  Target hemoglobin is more than 8 grams/deciliter.      Thank you for allowing us to participate in the care of this patient.  Contact us with any question or concern.    Missael Choi MD

## 2020-06-14 NOTE — SUBJECTIVE & OBJECTIVE
Interval History: NGT removed today; no complaints. Ready for PT today.    Review of Systems   Constitutional: Negative for fever.   HENT: Negative for hearing loss.         NGT clamped     Eyes: Negative for visual disturbance.   Respiratory: Negative for cough and shortness of breath.    Cardiovascular: Negative for chest pain and palpitations.   Gastrointestinal: Positive for abdominal pain (tenderness when moving).        Painful as expected following partial bowel resection   Genitourinary: Negative for difficulty urinating, dysuria, frequency and hematuria.   Musculoskeletal: Negative for arthralgias and back pain.   Skin: Negative for color change and rash.   Neurological: Positive for weakness. Negative for seizures, syncope, light-headedness, numbness and headaches.   Hematological: Bruises/bleeds easily.   Psychiatric/Behavioral: The patient is not nervous/anxious.       Objective:     Vital Signs (Most Recent):  Temp: 97.9 °F (36.6 °C) (06/14/20 1212)  Pulse: 66 (06/14/20 1212)  Resp: 17 (06/14/20 1212)  BP: (!) 143/67 (06/14/20 1212)  SpO2: 96 % (06/14/20 1212) Vital Signs (24h Range):  Temp:  [97.8 °F (36.6 °C)-98.7 °F (37.1 °C)] 97.9 °F (36.6 °C)  Pulse:  [66-86] 66  Resp:  [16-18] 17  SpO2:  [92 %-97 %] 96 %  BP: (143-180)/(67-86) 143/67     Weight: 75.2 kg (165 lb 12.6 oz)  Body mass index is 22.48 kg/m².    Intake/Output Summary (Last 24 hours) at 6/14/2020 1437  Last data filed at 6/14/2020 1000  Gross per 24 hour   Intake 1503.75 ml   Output 2120 ml   Net -616.25 ml      Physical Exam  Constitutional:       Appearance: He is well-developed.   HENT:      Head: Normocephalic and atraumatic.   Eyes:      Extraocular Movements: EOM normal.      Conjunctiva/sclera: Conjunctivae normal.   Neck:      Musculoskeletal: Normal range of motion.      Thyroid: No thyromegaly.   Cardiovascular:      Rate and Rhythm: Normal rate and regular rhythm.   Pulmonary:      Effort: Pulmonary effort is normal. No  respiratory distress.   Abdominal:      Comments: Soft, nondistended, appropriately TTP; incision c/d/i without erythema or drainage; RUQ ostomy pink and moist without stool or gas in bag   Musculoskeletal:         General: No tenderness or edema.      Comments: R foot s/p amputation   Skin:     General: Skin is warm and dry.      Capillary Refill: Capillary refill takes less than 2 seconds.      Findings: No rash.   Neurological:      Mental Status: He is alert and oriented to person, place, and time.   Psychiatric:         Mood and Affect: Mood and affect normal.       Significant Labs:   CBC:   Recent Labs   Lab 06/14/20  0524   WBC 6.97   HGB 9.4*   HCT 31.5*        CMP:   Recent Labs   Lab 06/14/20  0524      K 4.1      CO2 21*   *   BUN 12   CREATININE 1.6*   CALCIUM 8.4*   PROT 6.5   ALBUMIN 2.7*   BILITOT 0.5   ALKPHOS 133   AST 18   ALT 14   ANIONGAP 9   EGFRNONAA 43*       Significant Imaging: I have reviewed all pertinent imaging results/findings within the past 24 hours.

## 2020-06-14 NOTE — ASSESSMENT & PLAN NOTE
-in the setting of lower GI Bleed   -ASA and Plavix on hold   -H/H 7.5/24.4   -PRBCs transfused x 1 unit with current unit in progress   -serial H/H's in progress-will continue to transfuse as needed   -supplemental oxygen as needed   H&H stable over the past 1 day; no gross BRBPR since yesterday AM; s/p 3U PRBCs  6/11/20: Hgb 8.9  -monitor closely and tfx prn  6/14/20  stable

## 2020-06-14 NOTE — PT/OT/SLP PROGRESS
Physical Therapy      Patient Name:  Kodi Ribeiro   MRN:  [id number]    PT eval initiated via Epic chart review.  Will follow-up at next visit to complete PT eval.    Farheen Sigala, PT, DPT  [phone]

## 2020-06-15 ENCOUNTER — NURSE TRIAGE (OUTPATIENT)
Dept: ADMINISTRATIVE | Facility: CLINIC | Age: 70
End: 2020-06-15

## 2020-06-15 PROBLEM — Z51.5 ENCOUNTER FOR PALLIATIVE CARE: Status: ACTIVE | Noted: 2020-06-15

## 2020-06-15 LAB
ALBUMIN SERPL BCP-MCNC: 2.7 G/DL (ref 3.5–5.2)
ALP SERPL-CCNC: 141 U/L (ref 55–135)
ALT SERPL W/O P-5'-P-CCNC: 14 U/L (ref 10–44)
ANION GAP SERPL CALC-SCNC: 7 MMOL/L (ref 8–16)
APTT BLDCRRT: 36.2 SEC (ref 21–32)
APTT BLDCRRT: 37.1 SEC (ref 21–32)
APTT BLDCRRT: 38.4 SEC (ref 21–32)
APTT BLDCRRT: 43.3 SEC (ref 21–32)
AST SERPL-CCNC: 15 U/L (ref 10–40)
BASOPHILS # BLD AUTO: 0.02 K/UL (ref 0–0.2)
BASOPHILS # BLD AUTO: 0.02 K/UL (ref 0–0.2)
BASOPHILS NFR BLD: 0.4 % (ref 0–1.9)
BASOPHILS NFR BLD: 0.4 % (ref 0–1.9)
BILIRUB SERPL-MCNC: 0.4 MG/DL (ref 0.1–1)
BUN SERPL-MCNC: 11 MG/DL (ref 8–23)
CALCIUM SERPL-MCNC: 8.6 MG/DL (ref 8.7–10.5)
CHLORIDE SERPL-SCNC: 110 MMOL/L (ref 95–110)
CO2 SERPL-SCNC: 22 MMOL/L (ref 23–29)
CREAT SERPL-MCNC: 1.5 MG/DL (ref 0.5–1.4)
DIFFERENTIAL METHOD: ABNORMAL
DIFFERENTIAL METHOD: ABNORMAL
EOSINOPHIL # BLD AUTO: 0.3 K/UL (ref 0–0.5)
EOSINOPHIL # BLD AUTO: 0.3 K/UL (ref 0–0.5)
EOSINOPHIL NFR BLD: 5.3 % (ref 0–8)
EOSINOPHIL NFR BLD: 5.3 % (ref 0–8)
ERYTHROCYTE [DISTWIDTH] IN BLOOD BY AUTOMATED COUNT: 16.8 % (ref 11.5–14.5)
ERYTHROCYTE [DISTWIDTH] IN BLOOD BY AUTOMATED COUNT: 16.8 % (ref 11.5–14.5)
EST. GFR  (AFRICAN AMERICAN): 53 ML/MIN/1.73 M^2
EST. GFR  (NON AFRICAN AMERICAN): 46 ML/MIN/1.73 M^2
GLUCOSE SERPL-MCNC: 111 MG/DL (ref 70–110)
HCT VFR BLD AUTO: 30 % (ref 40–54)
HCT VFR BLD AUTO: 30 % (ref 40–54)
HGB BLD-MCNC: 9.4 G/DL (ref 14–18)
HGB BLD-MCNC: 9.4 G/DL (ref 14–18)
IMM GRANULOCYTES # BLD AUTO: 0.02 K/UL (ref 0–0.04)
IMM GRANULOCYTES # BLD AUTO: 0.02 K/UL (ref 0–0.04)
IMM GRANULOCYTES NFR BLD AUTO: 0.4 % (ref 0–0.5)
IMM GRANULOCYTES NFR BLD AUTO: 0.4 % (ref 0–0.5)
LYMPHOCYTES # BLD AUTO: 0.9 K/UL (ref 1–4.8)
LYMPHOCYTES # BLD AUTO: 0.9 K/UL (ref 1–4.8)
LYMPHOCYTES NFR BLD: 16.4 % (ref 18–48)
LYMPHOCYTES NFR BLD: 16.4 % (ref 18–48)
MAGNESIUM SERPL-MCNC: 2 MG/DL (ref 1.6–2.6)
MCH RBC QN AUTO: 27.2 PG (ref 27–31)
MCH RBC QN AUTO: 27.2 PG (ref 27–31)
MCHC RBC AUTO-ENTMCNC: 31.3 G/DL (ref 32–36)
MCHC RBC AUTO-ENTMCNC: 31.3 G/DL (ref 32–36)
MCV RBC AUTO: 87 FL (ref 82–98)
MCV RBC AUTO: 87 FL (ref 82–98)
MONOCYTES # BLD AUTO: 0.4 K/UL (ref 0.3–1)
MONOCYTES # BLD AUTO: 0.4 K/UL (ref 0.3–1)
MONOCYTES NFR BLD: 8.1 % (ref 4–15)
MONOCYTES NFR BLD: 8.1 % (ref 4–15)
NEUTROPHILS # BLD AUTO: 3.8 K/UL (ref 1.8–7.7)
NEUTROPHILS # BLD AUTO: 3.8 K/UL (ref 1.8–7.7)
NEUTROPHILS NFR BLD: 69.4 % (ref 38–73)
NEUTROPHILS NFR BLD: 69.4 % (ref 38–73)
NRBC BLD-RTO: 0 /100 WBC
NRBC BLD-RTO: 0 /100 WBC
PHOSPHATE SERPL-MCNC: 3.1 MG/DL (ref 2.7–4.5)
PLATELET # BLD AUTO: 189 K/UL (ref 150–350)
PLATELET # BLD AUTO: 189 K/UL (ref 150–350)
PMV BLD AUTO: 9.4 FL (ref 9.2–12.9)
PMV BLD AUTO: 9.4 FL (ref 9.2–12.9)
POTASSIUM SERPL-SCNC: 3.8 MMOL/L (ref 3.5–5.1)
PROT SERPL-MCNC: 6.8 G/DL (ref 6–8.4)
RBC # BLD AUTO: 3.45 M/UL (ref 4.6–6.2)
RBC # BLD AUTO: 3.45 M/UL (ref 4.6–6.2)
SODIUM SERPL-SCNC: 139 MMOL/L (ref 136–145)
WBC # BLD AUTO: 5.43 K/UL (ref 3.9–12.7)
WBC # BLD AUTO: 5.43 K/UL (ref 3.9–12.7)

## 2020-06-15 PROCEDURE — 25000003 PHARM REV CODE 250: Mod: HCNC | Performed by: COLON & RECTAL SURGERY

## 2020-06-15 PROCEDURE — 97162 PT EVAL MOD COMPLEX 30 MIN: CPT | Mod: HCNC

## 2020-06-15 PROCEDURE — 97530 THERAPEUTIC ACTIVITIES: CPT | Mod: HCNC

## 2020-06-15 PROCEDURE — 25000003 PHARM REV CODE 250: Mod: HCNC | Performed by: NURSE PRACTITIONER

## 2020-06-15 PROCEDURE — 99232 PR SUBSEQUENT HOSPITAL CARE,LEVL II: ICD-10-PCS | Mod: HCNC,,, | Performed by: INTERNAL MEDICINE

## 2020-06-15 PROCEDURE — 97535 SELF CARE MNGMENT TRAINING: CPT | Mod: HCNC

## 2020-06-15 PROCEDURE — 99497 ADVNCD CARE PLAN 30 MIN: CPT | Mod: HCNC,25,, | Performed by: PHYSICIAN ASSISTANT

## 2020-06-15 PROCEDURE — 99223 1ST HOSP IP/OBS HIGH 75: CPT | Mod: HCNC,,, | Performed by: PHYSICIAN ASSISTANT

## 2020-06-15 PROCEDURE — 63600175 PHARM REV CODE 636 W HCPCS: Mod: HCNC | Performed by: COLON & RECTAL SURGERY

## 2020-06-15 PROCEDURE — 99024 POSTOP FOLLOW-UP VISIT: CPT | Mod: HCNC,,, | Performed by: COLON & RECTAL SURGERY

## 2020-06-15 PROCEDURE — 99024 PR POST-OP FOLLOW-UP VISIT: ICD-10-PCS | Mod: HCNC,,, | Performed by: COLON & RECTAL SURGERY

## 2020-06-15 PROCEDURE — 84100 ASSAY OF PHOSPHORUS: CPT | Mod: HCNC

## 2020-06-15 PROCEDURE — 80053 COMPREHEN METABOLIC PANEL: CPT | Mod: HCNC

## 2020-06-15 PROCEDURE — 85730 THROMBOPLASTIN TIME PARTIAL: CPT | Mod: 91,HCNC

## 2020-06-15 PROCEDURE — 97165 OT EVAL LOW COMPLEX 30 MIN: CPT | Mod: HCNC

## 2020-06-15 PROCEDURE — 21400001 HC TELEMETRY ROOM: Mod: HCNC

## 2020-06-15 PROCEDURE — 94761 N-INVAS EAR/PLS OXIMETRY MLT: CPT | Mod: HCNC

## 2020-06-15 PROCEDURE — S5010 5% DEXTROSE AND 0.45% SALINE: HCPCS | Mod: HCNC | Performed by: COLON & RECTAL SURGERY

## 2020-06-15 PROCEDURE — 83735 ASSAY OF MAGNESIUM: CPT | Mod: HCNC

## 2020-06-15 PROCEDURE — C9113 INJ PANTOPRAZOLE SODIUM, VIA: HCPCS | Mod: HCNC | Performed by: COLON & RECTAL SURGERY

## 2020-06-15 PROCEDURE — 99232 SBSQ HOSP IP/OBS MODERATE 35: CPT | Mod: HCNC,,, | Performed by: INTERNAL MEDICINE

## 2020-06-15 PROCEDURE — 25000003 PHARM REV CODE 250: Mod: HCNC | Performed by: INTERNAL MEDICINE

## 2020-06-15 PROCEDURE — 99497 PR ADVNCD CARE PLAN 30 MIN: ICD-10-PCS | Mod: HCNC,25,, | Performed by: PHYSICIAN ASSISTANT

## 2020-06-15 PROCEDURE — 63600175 PHARM REV CODE 636 W HCPCS: Mod: HCNC | Performed by: NURSE PRACTITIONER

## 2020-06-15 PROCEDURE — 85025 COMPLETE CBC W/AUTO DIFF WBC: CPT | Mod: HCNC

## 2020-06-15 PROCEDURE — 36415 COLL VENOUS BLD VENIPUNCTURE: CPT | Mod: HCNC

## 2020-06-15 PROCEDURE — 99223 PR INITIAL HOSPITAL CARE,LEVL III: ICD-10-PCS | Mod: HCNC,,, | Performed by: PHYSICIAN ASSISTANT

## 2020-06-15 RX ADMIN — HYDROMORPHONE HYDROCHLORIDE 0.4 MG: 1 INJECTION, SOLUTION INTRAMUSCULAR; INTRAVENOUS; SUBCUTANEOUS at 10:06

## 2020-06-15 RX ADMIN — CARVEDILOL 25 MG: 12.5 TABLET, FILM COATED ORAL at 08:06

## 2020-06-15 RX ADMIN — HYDROMORPHONE HYDROCHLORIDE 0.4 MG: 1 INJECTION, SOLUTION INTRAMUSCULAR; INTRAVENOUS; SUBCUTANEOUS at 09:06

## 2020-06-15 RX ADMIN — DIPHENHYDRAMINE HYDROCHLORIDE 25 MG: 25 CAPSULE ORAL at 08:06

## 2020-06-15 RX ADMIN — HEPARIN SODIUM 13.96 UNITS/KG/HR: 10000 INJECTION, SOLUTION INTRAVENOUS at 07:06

## 2020-06-15 RX ADMIN — ATORVASTATIN CALCIUM 40 MG: 40 TABLET, FILM COATED ORAL at 08:06

## 2020-06-15 RX ADMIN — AMLODIPINE BESYLATE 5 MG: 5 TABLET ORAL at 08:06

## 2020-06-15 RX ADMIN — HYDROMORPHONE HYDROCHLORIDE 0.4 MG: 1 INJECTION, SOLUTION INTRAMUSCULAR; INTRAVENOUS; SUBCUTANEOUS at 04:06

## 2020-06-15 RX ADMIN — HYDROMORPHONE HYDROCHLORIDE 0.4 MG: 1 INJECTION, SOLUTION INTRAMUSCULAR; INTRAVENOUS; SUBCUTANEOUS at 03:06

## 2020-06-15 RX ADMIN — DEXTROSE AND SODIUM CHLORIDE: 5; .45 INJECTION, SOLUTION INTRAVENOUS at 02:06

## 2020-06-15 RX ADMIN — ISOSORBIDE MONONITRATE 120 MG: 60 TABLET, EXTENDED RELEASE ORAL at 08:06

## 2020-06-15 RX ADMIN — PANTOPRAZOLE SODIUM 40 MG: 40 INJECTION, POWDER, LYOPHILIZED, FOR SOLUTION INTRAVENOUS at 08:06

## 2020-06-15 NOTE — PROGRESS NOTES
[facility] -   Hematology/Oncology  Progress Note    Patient Name: Kodi Ribeiro  Admission Date: 6/8/2020  Hospital Length of Stay: 6 days  Code Status: Prior     Subjective:     HPI:  70-year-old male with past medical history significant for chronic kidney disease, CHF, CVA, coronary artery disease, hypertension, hyperlipidemia, PVD with previous AAA repair who presented with complaints of hematochezia and melena since January intermittently.  He  recently underwent colonoscopy 06/02/2020 that showed malignant-appearing mass in the transverse colon and possibly descending colon.  The above masses were biopsied and confirmed adenocarcinoma.  Who CT abdomen and pelvis showed stable reticulonodular opacity within the right lower lobe otherwise stable abdomen and pelvis.  Oncology was consulted given the new diagnosis of colonic adenocarcinoma.    Interval History: Patient denies any new complaints. Negative for acute events overnight. Plan is for discharge to rehab. Patient states he does not desire to have chemotherapy for treatment for colon cancer. States he will be seen as outpatient to discuss further.     Oncology Treatment Plan:   [No treatment plan]    Medications:  Continuous Infusions:   dextrose 5 % and 0.45 % NaCl 75 mL/hr at 06/13/20 2333    heparin (porcine) in D5W 14 Units/kg/hr (06/15/20 1059)     Scheduled Meds:   amLODIPine  5 mg Oral Daily    atorvastatin  40 mg Oral Daily    carvediloL  25 mg Oral BID    isosorbide mononitrate  120 mg Oral Daily    nozaseptin   Each Nostril BID    pantoprazole  40 mg Intravenous Daily     PRN Meds:sodium chloride, sodium chloride, sodium chloride, sodium chloride, sodium chloride, acetaminophen, albuterol-ipratropium, diphenhydrAMINE, hydrALAZINE, HYDROmorphone, naloxone, ondansetron, promethazine (PHENERGAN) IVPB     Review of Systems   Constitutional: Positive for activity change, appetite change and fatigue. Negative for chills,  diaphoresis, fever and unexpected weight change.   HENT: Negative for congestion, hearing loss, nosebleeds, postnasal drip, sinus pressure, sneezing, sore throat and trouble swallowing.    Eyes: Negative for discharge and visual disturbance.   Respiratory: Negative for cough, chest tightness and shortness of breath.    Cardiovascular: Negative for chest pain and palpitations.   Gastrointestinal: Positive for abdominal distention and constipation. Negative for abdominal pain, diarrhea, nausea and vomiting.   Endocrine: Negative for cold intolerance and heat intolerance.   Genitourinary: Negative for difficulty urinating, dysuria, flank pain and hematuria.   Musculoskeletal: Positive for arthralgias, back pain and gait problem. Negative for myalgias.   Skin: Negative.    Neurological: Negative for dizziness and weakness.   Hematological: Negative for adenopathy. Does not bruise/bleed easily.   Psychiatric/Behavioral: Negative for agitation, behavioral problems and confusion. The patient is nervous/anxious.      Objective:     Vital Signs (Most Recent):  Temp: 98.4 °F (36.9 °C) (06/15/20 0730)  Pulse: 70 (06/15/20 0730)  Resp: 18 (06/15/20 0730)  BP: (!) 168/79 (06/15/20 0943)  SpO2: 97 % (06/15/20 0730) Vital Signs (24h Range):  Temp:  [97.5 °F (36.4 °C)-98.5 °F (36.9 °C)] 98.4 °F (36.9 °C)  Pulse:  [63-78] 70  Resp:  [17-18] 18  SpO2:  [95 %-97 %] 97 %  BP: (143-180)/(67-82) 168/79     Weight: 75.2 kg (165 lb 12.6 oz)  Body mass index is 22.48 kg/m².  Body surface area is 1.95 meters squared.      Intake/Output Summary (Last 24 hours) at 6/15/2020 1133  Last data filed at 6/15/2020 0828  Gross per 24 hour   Intake 1864.63 ml   Output 1450 ml   Net 414.63 ml       Physical Exam  Vitals signs and nursing note reviewed.   Constitutional:       General: He is not in acute distress.     Appearance: He is well-developed and well-nourished. He is ill-appearing. He is not diaphoretic.   HENT:      Head: Normocephalic and  atraumatic.      Right Ear: Hearing and external ear normal.      Left Ear: Hearing and external ear normal.      Nose: Nose normal. No mucosal edema, rhinorrhea or epistaxis.      Mouth/Throat:      Mouth: Oropharynx is clear and moist and mucous membranes are normal.      Pharynx: Uvula midline.   Eyes:      General:         Right eye: No discharge.         Left eye: No discharge.      Extraocular Movements: EOM normal.      Conjunctiva/sclera: Conjunctivae normal.      Right eye: No chemosis.     Left eye: No chemosis.     Pupils: Pupils are equal, round, and reactive to light.   Neck:      Musculoskeletal: Normal range of motion and neck supple.      Thyroid: No thyroid mass or thyromegaly.      Trachea: Trachea normal.   Cardiovascular:      Rate and Rhythm: Normal rate and regular rhythm.      Pulses: Intact distal pulses.      Heart sounds: Normal heart sounds. No murmur.   Pulmonary:      Effort: Pulmonary effort is normal. No respiratory distress.      Breath sounds: Examination of the right-lower field reveals decreased breath sounds. Examination of the left-lower field reveals decreased breath sounds. Decreased breath sounds present. No wheezing.   Abdominal:      General: Bowel sounds are decreased. There is distension.      Palpations: Abdomen is soft.      Tenderness: There is no abdominal tenderness.   Musculoskeletal: Normal range of motion.        Legs:    Feet:      Right foot:      Amputation: Right leg is amputated below knee.   Lymphadenopathy:      Cervical: No cervical adenopathy.      Upper Body:      Right upper body: No supraclavicular adenopathy.      Left upper body: No supraclavicular adenopathy.   Skin:     General: Skin is warm and dry.      Capillary Refill: Capillary refill takes less than 2 seconds.      Findings: No rash.   Neurological:      Mental Status: He is alert and oriented to person, place, and time.   Psychiatric:         Mood and Affect: Mood is anxious.         Speech:  Speech normal.         Behavior: Behavior normal.         Thought Content: Thought content normal.         Judgment: Judgment normal.         Significant Labs:   CBC:   Recent Labs   Lab 06/14/20  0524 06/14/20  1522 06/15/20  0615   WBC 6.97 6.57 5.43  5.43   HGB 9.4* 9.0* 9.4*  9.4*   HCT 31.5* 28.9* 30.0*  30.0*    173 189  189    and CMP:   Recent Labs   Lab 06/14/20  0524 06/15/20  0615    139   K 4.1 3.8    110   CO2 21* 22*   * 111*   BUN 12 11   CREATININE 1.6* 1.5*   CALCIUM 8.4* 8.6*   PROT 6.5 6.8   ALBUMIN 2.7* 2.7*   BILITOT 0.5 0.4   ALKPHOS 133 141*   AST 18 15   ALT 14 14   ANIONGAP 9 7*   EGFRNONAA 43* 46*       Diagnostic Results:  I have reviewed all pertinent imaging results/findings within the past 24 hours.    Assessment/Plan:     * Colon adenocarcinoma  Status post open partial transverse colectomy with end proximal transverse colostomy on 06/11/2020.  -postsurgical management per surgery service.  -discussed with patient the natural history and prognosis of metastatic colonic adenocarcinoma.  Patient remains adamant about refusal of chemotherapy.  -recommend outpatient follow-up for further discussion.  --Consult palliative care    Acute blood loss anemia  Admitted with c/o GI bleeding secondary to Colon cancer. S/P colon resection    --Daily CBC, CMP  --Transfuse for Hgb <7.0        Thank you for your consult. I will follow-up with patient. Please contact us if you have any additional questions.     Breana Carreon NP  Hematology/Oncology  [facility] [place]

## 2020-06-15 NOTE — SUBJECTIVE & OBJECTIVE
Interval History: doing well with clear liquid diet.    Review of Systems   Constitutional: Negative for fever.   HENT: Negative for hearing loss.         NGT clamped     Eyes: Negative for visual disturbance.   Respiratory: Negative for cough and shortness of breath.    Cardiovascular: Negative for chest pain and palpitations.   Gastrointestinal: Positive for abdominal pain (tenderness when moving).        Painful as expected following partial bowel resection   Genitourinary: Negative for difficulty urinating, dysuria, frequency and hematuria.   Musculoskeletal: Negative for arthralgias and back pain.   Skin: Negative for color change and rash.   Neurological: Positive for weakness. Negative for seizures, syncope, light-headedness, numbness and headaches.   Hematological: Bruises/bleeds easily.   Psychiatric/Behavioral: The patient is not nervous/anxious.       Objective:     Vital Signs (Most Recent):  Temp: 97.8 °F (36.6 °C) (06/15/20 1620)  Pulse: 69 (06/15/20 1620)  Resp: 16 (06/15/20 1620)  BP: (!) 157/79 (06/15/20 1620)  SpO2: 97 % (06/15/20 1620) Vital Signs (24h Range):  Temp:  [97.5 °F (36.4 °C)-98.5 °F (36.9 °C)] 97.8 °F (36.6 °C)  Pulse:  [63-78] 69  Resp:  [16-18] 16  SpO2:  [95 %-98 %] 97 %  BP: (146-180)/(72-82) 157/79     Weight: 75.2 kg (165 lb 12.6 oz)  Body mass index is 22.48 kg/m².    Intake/Output Summary (Last 24 hours) at 6/15/2020 1701  Last data filed at 6/15/2020 1238  Gross per 24 hour   Intake 2404.63 ml   Output 1450 ml   Net 954.63 ml      Physical Exam  Constitutional:       Appearance: He is well-developed.   HENT:      Head: Normocephalic and atraumatic.   Eyes:      Extraocular Movements: EOM normal.      Conjunctiva/sclera: Conjunctivae normal.   Neck:      Musculoskeletal: Normal range of motion.      Thyroid: No thyromegaly.   Cardiovascular:      Rate and Rhythm: Normal rate and regular rhythm.   Pulmonary:      Effort: Pulmonary effort is normal. No respiratory distress.    Abdominal:      Comments: Soft, nondistended, appropriately TTP; incision c/d/i without erythema or drainage; RUQ ostomy pink and moist with minor amount of stool and gas in bag   Musculoskeletal:         General: No tenderness or edema.      Comments: S/p right BKA   Skin:     General: Skin is warm and dry.      Capillary Refill: Capillary refill takes less than 2 seconds.      Findings: No rash.   Neurological:      Mental Status: He is alert and oriented to person, place, and time.   Psychiatric:         Mood and Affect: Mood and affect normal.       Significant Labs:   CBC:   Recent Labs   Lab 06/14/20  0524 06/14/20  1522 06/15/20  0615   WBC 6.97 6.57 5.43  5.43   HGB 9.4* 9.0* 9.4*  9.4*   HCT 31.5* 28.9* 30.0*  30.0*    173 189  189     CMP:   Recent Labs   Lab 06/14/20  0524 06/15/20  0615    139   K 4.1 3.8    110   CO2 21* 22*   * 111*   BUN 12 11   CREATININE 1.6* 1.5*   CALCIUM 8.4* 8.6*   PROT 6.5 6.8   ALBUMIN 2.7* 2.7*   BILITOT 0.5 0.4   ALKPHOS 133 141*   AST 18 15   ALT 14 14   ANIONGAP 9 7*   EGFRNONAA 43* 46*     Significant Imaging: I have reviewed all pertinent imaging results/findings within the past 24 hours.

## 2020-06-15 NOTE — PT/OT/SLP EVAL
Physical Therapy Evaluation    Patient Name:  Kodi Ribeiro   MRN:  [id number]    Recommendations:     Discharge Recommendations:  home health PT   Discharge Equipment Recommendations: none   Barriers to discharge: None    Assessment:     Kodi Ribeiro is a 70 y.o. male admitted with a medical diagnosis of Colon adenocarcinoma.  He presents with the following impairments/functional limitations:  weakness, impaired endurance, impaired self care skills, impaired functional mobilty, gait instability, impaired balance, pain, decreased lower extremity function.    Rehab Prognosis: Good; patient would benefit from acute skilled PT services to address these deficits and reach maximum level of function.    Recent Surgery: Procedure(s) (LRB):  COLECTOMY, PARTIAL (N/A)  BLOCK, TRANSVERSUS ABDOMINIS PLANE (N/A)  LYSIS, ADHESIONS (N/A)  CREATION, COLOSTOMY (N/A)  OMENTECTOMY (N/A) 4 Days Post-Op    Plan:     During this hospitalization, patient to be seen 5 x/week to address the identified rehab impairments via gait training, therapeutic activities, therapeutic exercises and progress toward the following goals:    · Plan of Care Expires:  06/22/20    Subjective     Chief Complaint: Weakness  Patient/Family Comments/goals: To go home and resume home health therapy  Pain/Comfort:  · Pain Rating 1: 4/10  · Location 1: abdomen  · Pain Addressed 1: Reposition, Distraction, Cessation of Activity  · Pain Rating Post-Intervention 1: 4/10    Patients cultural, spiritual, L[employer] conflicts given the current situation:      Living Environment:  Patient lives home with his spouse in a one-story house with no stair/steps.  Prior to admission, patients level of function was modified indep ambulation with RLE prosthesis and LLE orthotic.  Patient was independent with ADLs and driving.  Equipment used at home: grab bar, cane, straight.  DME owned (not currently used): none.  Upon discharge, patient will have assistance from his  spouse.    Objective:     Communicated with Epic and nurse Milady prior to session.  Patient found HOB elevated with peripheral IV  upon PT entry to room.    General Precautions: Standard, fall, NPO   Orthopedic Precautions:N/A   Braces: N/A     Exams:  · Cognitive Exam:  Patient is oriented to Person, Place, Time and Situation  · Gross Motor Coordination:  WFL  · Postural Exam:  Patient presented with the following abnormalities:    · -       Rounded shoulders  · -       Forward head  · RLE ROM: WFL  · RLE Strength: WFL  · LLE ROM: WFL  · LLE Strength: WFL   · Patient has R BKA and L partial foot amputation.    Functional Mobility:  · Bed Mobility:     · Rolling Left:  contact guard assistance  · Scooting: contact guard assistance  · Supine to Sit: contact guard assistance  · Transfers:     · Sit to Stand:  minimum assistance with rolling walker  · Bed to Chair: minimum assistance with  rolling walker  using  Stand Pivot  · Gait: Gait training 60 feet with RW with min assist with RLE prosthesis and LLE AFO/special shoe.  Patient with forward flexed posture, minimally unsteady.  · Balance: Good sitting balance; Poor standing balance    Therapeutic Activities and Exercises:   Patient participated in bed mobility, transfer training with RW, and gait training with RW (see above for details).  Patient demonstrated good sitting balance at edge of bed without support while independently donning his RLE prosthesis and LLE orthotic.  At end of session, patient encouraged to sit up in bedside chair as long as tolerated and to call nurse's station for assistance when ready to return to bed.    AM-PAC 6 CLICK MOBILITY  Total Score:16     Patient left up in chair with all lines intact, call button in reach, chair alarm on and spouse present.    GOALS:   Multidisciplinary Problems     Physical Therapy Goals        Problem: Physical Therapy Goal    Goal Priority Disciplines Outcome Goal Variances Interventions   Physical Therapy  Goal     PT, PT/OT      Description: LTGs to be met within 7 days (6/22/20):  1.  Patient will perform bed mobility with modified independence  2.  Patient will perform functional transfers with appropriate AD with SBA  3.  Patient will ambulate 200 feet with appropriate AD with CGA and no gross LOB                   History:     Past Medical History:   Diagnosis Date    Acute on chronic congestive heart failure 1/13/2020    Acute respiratory failure with hypoxia 1/14/2020    Analgesic nephropathy     Anemia     AP (angina pectoris) 1/11/2019    Arthritis     Colon polyp     Repeat colonoscopy due in 9/14    COPD exacerbation 2/6/2020    Coronary artery disease     Diverticulosis     colonoscopy 2/21/2014    Dysthymia 2/13/2020    Encounter for blood transfusion     GERD (gastroesophageal reflux disease)     Hemorrhoids     colonoscopy 2/21/2014    Horseshoe kidney     Hyperglycemia 3/17/2014    Hyperlipidemia     Hypertension     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    Late complications of amputation stump     rseolved with further amputation( MRSA then none since 2014)    Lipoma of colon     colonoscopy 2/21/2014    Myocardial infarction     per patient 2000 & 9/2012    Peripheral vascular disease     Phantom limb syndrome     patient reports only intermittent not problematic, not worsening    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    Spinal cord disease     L4L5 disc    Stroke     Tobacco dependence     resolved    Ureteral stent retained        Past Surgical History:   Procedure Laterality Date    ABDOMINAL AORTIC ANEURYSM REPAIR      ABDOMINAL AORTIC ANEURYSM REPAIR  1996/2014    AMPUTATION, LOWER LIMB      AORTA - BILATERAL FEMORAL ARTERY BYPASS GRAFT  2014    Left and right leg    COLONOSCOPY N/A 6/2/2020    Procedure: COLONOSCOPY;  Surgeon: Rosanna Harrington MD;  Location: [facility];  Service: Endoscopy;  Laterality: N/A;    COLOSTOMY N/A 6/11/2020    Procedure:  CREATION, COLOSTOMY;  Surgeon: Leonardo Yang MD;  Location: [facility] OR;  Service: General;  Laterality: N/A;    CORONARY ANGIOPLASTY WITH STENT PLACEMENT  2000    Three placed in heart    CYSTOSCOPY W/ RETROGRADES Left 5/29/2018    Procedure: CYSTOSCOPY, WITH RETROGRADE PYELOGRAM;  Surgeon: Scooter Jin IV, MD;  Location: [facility] OR;  Service: Urology;  Laterality: Left;    CYSTOSCOPY W/ URETERAL STENT PLACEMENT Left 5/29/2018    Procedure: CYSTOSCOPY, WITH URETERAL STENT INSERTION;  Surgeon: Scooter Jin IV, MD;  Location: [facility] OR;  Service: Urology;  Laterality: Left;    CYSTOSCOPY W/ URETERAL STENT PLACEMENT Left 2/4/2020    Procedure: CYSTOSCOPY, WITH URETERAL STENT INSERTION;  Surgeon: Scooter Jin IV, MD;  Location: [facility] OR;  Service: Urology;  Laterality: Left;    CYSTOSCOPY W/ URETERAL STENT REMOVAL Left 5/29/2018    Procedure: CYSTOSCOPY, WITH URETERAL STENT REMOVAL;  Surgeon: Scooter Jin IV, MD;  Location: [facility] OR;  Service: Urology;  Laterality: Left;    CYSTOSCOPY W/ URETERAL STENT REMOVAL Left 2/4/2020    Procedure: CYSTOSCOPY, WITH URETERAL STENT REMOVAL;  Surgeon: Scooter Jin IV, MD;  Location: [facility] OR;  Service: Urology;  Laterality: Left;    ESOPHAGOGASTRODUODENOSCOPY N/A 6/2/2020    Procedure: EGD (ESOPHAGOGASTRODUODENOSCOPY);  Surgeon: Rosanna Harrington MD;  [facility];  Service: Endoscopy;  Laterality: N/A;    FOOT AMPUTATION THROUGH METATARSAL  1996    left    FOOT SURGERY Bilateral 1980's    per patient multiple toe amputations prior to.  partial foot amputation:first great toe then other toes     INJECTION OF ANESTHETIC AGENT INTO TISSUE PLANE DEFINED BY TRANSVERSUS ABDOMINIS MUSCLE N/A 6/11/2020    Procedure: BLOCK, TRANSVERSUS ABDOMINIS PLANE;  Surgeon: Leonardo Yang MD;  Location: [facility];  Service: General;  Laterality: N/A;    KIDNEY SURGERY  2014    per patient separation of horseshoe kidney @ time of AAA repair    LEFT HEART  CATHETERIZATION Left 3/7/2019    Procedure: CATHETERIZATION, HEART, LEFT;  Surgeon: Adriel Boone MD;  Location: [facility] CATH LAB;  Service: Cardiology;  Laterality: Left;  630 admit for IV hydration  10am start    LUNG LOBECTOMY Right 1970s    per patient not cancer    LYSIS OF ADHESIONS N/A 6/11/2020    Procedure: LYSIS, ADHESIONS;  Surgeon: Leonardo Yang MD;  Location: [facility] OR;  Service: General;  Laterality: N/A;    OMENTECTOMY N/A 6/11/2020    Procedure: OMENTECTOMY;  Surgeon: Leonardo Yang MD;  Location: [facility] OR;  Service: General;  Laterality: N/A;    right below knee amputation  2009 (approx)    SMALL INTESTINE SURGERY  2014    per patient partial @ time of aaa repair  not small bowel - large bowel bowel compromised bythtwe AAAbowel    SUBTOTAL COLECTOMY N/A 6/11/2020    Procedure: COLECTOMY, PARTIAL;  Surgeon: Leonardo Yang MD;  Location: [facility] OR;  Service: General;  Laterality: N/A;    TONSILLECTOMY  1955 aprox    URETERAL STENT PLACEMENT Left     annually replaced since 2012 or so  Dr Jin       Time Tracking:     PT Received On: 06/15/20  PT Start Time: 1002     PT Stop Time: 1018  PT Total Time (min): 16 min     Billable Minutes: Evaluation 16 min      Farheen Sigala, PT, DPT  06/15/2020

## 2020-06-15 NOTE — PLAN OF CARE
Pt remained free of injury during shift, stable condition, pain adequately controlled with PRN medication, no acute distress, receiving IV fluids, receiving heparin per order, on cardiac monitoring (Sr, HR 60-70s), incision to abdomen CDI, colostomy to RUQ CDI, and will continue to monitor. 24hr chart review performed.

## 2020-06-15 NOTE — ASSESSMENT & PLAN NOTE
71yo M with multiple medical comorbidities who presents with transverse vs descending colonic adenocarcinoma with possible metastatic lung disease with acute lower GI bleed, recurrent who is now s/p open partial transverse colectomy with end proximal transverse colostomy on 6/11/2020    - Trial clear liquids today  - cont IVF  - cont ostomy nurse evaluation/education  - okay to trial hep gtt prior to reinitiation of PO anticoagulation given previous LGIB to ensure this does not start again, discussed with primary team. Would hold on plavix or asa at this point until can ensure pt will tolerate diet and not rebleed on heparin  - OOB encouraged  - PPx: ppi

## 2020-06-15 NOTE — CONSULTS
Follow up on Mr. Jackson today. He is awake and alert, his wife is at the bedside. RUQ colostomy with pouch intact, no output yet. Removed pouch for teaching. Stoma is pink and moist, 38 mm. Cleansed peristomal area with water and gauze. Sprayed peristomal area with cavilon. One piece Leander pouch then cut to fit and applied to patient. All of this done by patient's wife with minimal coaching only. He tolerated with minimal discomfort. New colostomy education continued at this time using teach back method. Educated on: emptying and resealing pouch, how and when; changing pouch, how and when; hygiene, activity, and dietary guidelines. Will follow.

## 2020-06-15 NOTE — PT/OT/SLP EVAL
Occupational Therapy   Evaluation and Discharge Note    Name: Kodi Ribeiro  MRN: [id number]  Admitting Diagnosis:  Colon adenocarcinoma 4 Days Post-Op    Recommendations:     Discharge Recommendations: home health OT(home health for safety assessment)  Discharge Equipment Recommendations:  none  Barriers to discharge:  None    Assessment:     Kodi Ribeiro is a 70 y.o. male with a medical diagnosis of Colon adenocarcinoma. At this time, patient is functioning at their prior level of function and does not require further acute OT services.     Plan:     During this hospitalization, patient does not require further acute OT services.  Please re-consult if situation changes.    · Plan of Care Reviewed with: patient    Subjective     Chief Complaint:  Patient/Family Comments/goals:     Occupational Profile:  Living Environment: pt lives with with spouse and grand daughter in 1 story   Previous level of function: mod  (I)  With adl's and functional mobility  Roles and Routines: occupational therapy  Equipment Used at home:  walker, rolling, wheelchair  Assistance upon Discharge:     Pain/Comfort:  Pain Rating 1: 0/10    Patients cultural, spiritual, Gnosticism conflicts given the current situation:      Objective:     Communicated with: nurse and epic chart review prior to session.  Patient found HOB elevated with peripheral IV upon OT entry to room.    General Precautions: Standard, fall   Orthopedic Precautions:N/A   Braces: N/A     Occupational Performance:    Bed Mobility:    · Patient completed Rolling/Turning to Left with  stand by assistance  · Patient completed Rolling/Turning to Right with stand by assistance  · Patient completed Scooting/Bridging with stand by assistance  · Patient completed Supine to Sit with stand by assistance  · Patient completed Sit to Supine with stand by assistance    Functional Mobility/Transfers:  · Patient completed Sit <> Stand Transfer with contact guard assistance  with   rolling walker   · Patient completed Bed <> Chair Transfer using Step Transfer technique with stand by assistance with rolling walker  · Functional Mobility: pt cga with functional mobility x 40 feet     Activities of Daily Living:  · Upper Body Dressing: supervision .  · Lower Body Dressing: supervision .  · Toileting: supervision .    Cognitive/Visual Perceptual:  Cognitive/Psychosocial Skills:     -       Oriented to: Person, Place, Time and Situation   -       Follows Commands/attention:Follows multistep  commands  -       Communication: clear/fluent  -       Memory: No Deficits noted  -       Safety awareness/insight to disability: intact   Visual/Perceptual:      -Intact .    Physical Exam:  Upper Extremity Range of Motion:     -       Right Upper Extremity: WFL  -       Left Upper Extremity: WFL  Upper Extremity Strength:    -       Right Upper Extremity: WFL  -       Left Upper Extremity: WFL   Strength:    -       Right Upper Extremity: WFL  -       Left Upper Extremity: WFL    AMPAC 6 Click ADL:  AMPAC Total Score: 24    Treatment & Education:    Education:    Patient left HOB elevated with all lines intact, call button in reach, bed alarm on and nurse present    GOALS:   Multidisciplinary Problems     Occupational Therapy Goals        Problem: Occupational Therapy Goal    Goal Priority Disciplines Outcome Interventions   Occupational Therapy Goal     OT, PT/OT     Description:                      History:     Past Medical History:   Diagnosis Date    Acute on chronic congestive heart failure 1/13/2020    Acute respiratory failure with hypoxia 1/14/2020    Analgesic nephropathy     Anemia     AP (angina pectoris) 1/11/2019    Arthritis     Colon polyp     Repeat colonoscopy due in 9/14    COPD exacerbation 2/6/2020    Coronary artery disease     Diverticulosis     colonoscopy 2/21/2014    Dysthymia 2/13/2020    Encounter for blood transfusion     GERD (gastroesophageal reflux disease)      Hemorrhoids     colonoscopy 2/21/2014    Horseshoe kidney     Hyperglycemia 3/17/2014    Hyperlipidemia     Hypertension     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    Late complications of amputation stump     rseolved with further amputation( MRSA then none since 2014)    Lipoma of colon     colonoscopy 2/21/2014    Myocardial infarction     per patient 2000 & 9/2012    Peripheral vascular disease     Phantom limb syndrome     patient reports only intermittent not problematic, not worsening    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    Spinal cord disease     L4L5 disc    Stroke     Tobacco dependence     resolved    Ureteral stent retained          Past Surgical History:   Procedure Laterality Date    ABDOMINAL AORTIC ANEURYSM REPAIR      ABDOMINAL AORTIC ANEURYSM REPAIR  1996/2014    AMPUTATION, LOWER LIMB      AORTA - BILATERAL FEMORAL ARTERY BYPASS GRAFT  2014    Left and right leg    COLONOSCOPY N/A 6/2/2020    Procedure: COLONOSCOPY;  Surgeon: Rosanna Harrington MD;  Location: [facility] ENDO;  Service: Endoscopy;  Laterality: N/A;    COLOSTOMY N/A 6/11/2020    Procedure: CREATION, COLOSTOMY;  Surgeon: Leonardo Yang MD;  Location: [facility] OR;  Service: General;  Laterality: N/A;    CORONARY ANGIOPLASTY WITH STENT PLACEMENT  2000    Three placed in heart    CYSTOSCOPY W/ RETROGRADES Left 5/29/2018    Procedure: CYSTOSCOPY, WITH RETROGRADE PYELOGRAM;  Surgeon: Scooter Jin IV, MD;  Location: [facility] OR;  Service: Urology;  Laterality: Left;    CYSTOSCOPY W/ URETERAL STENT PLACEMENT Left 5/29/2018    Procedure: CYSTOSCOPY, WITH URETERAL STENT INSERTION;  Surgeon: Scooter Jin IV, MD;  Location: [facility] OR;  Service: Urology;  Laterality: Left;    CYSTOSCOPY W/ URETERAL STENT PLACEMENT Left 2/4/2020    Procedure: CYSTOSCOPY, WITH URETERAL STENT INSERTION;  Surgeon: Scooter Jin IV, MD;  Location: [facility] OR;  Service: Urology;  Laterality: Left;    CYSTOSCOPY W/ URETERAL STENT  REMOVAL Left 5/29/2018    Procedure: CYSTOSCOPY, WITH URETERAL STENT REMOVAL;  Surgeon: Scooter Jin IV, MD;  Location: [facility] OR;  Service: Urology;  Laterality: Left;    CYSTOSCOPY W/ URETERAL STENT REMOVAL Left 2/4/2020    Procedure: CYSTOSCOPY, WITH URETERAL STENT REMOVAL;  Surgeon: Scooter Jin IV, MD;  Location: [facility] OR;  Service: Urology;  Laterality: Left;    ESOPHAGOGASTRODUODENOSCOPY N/A 6/2/2020    Procedure: EGD (ESOPHAGOGASTRODUODENOSCOPY);  Surgeon: Rosanna Harrington MD;  Location: [facility] ENDO;  Service: Endoscopy;  Laterality: N/A;    FOOT AMPUTATION THROUGH METATARSAL  1996    left    FOOT SURGERY Bilateral 1980's    per patient multiple toe amputations prior to.  partial foot amputation:first great toe then other toes     INJECTION OF ANESTHETIC AGENT INTO TISSUE PLANE DEFINED BY TRANSVERSUS ABDOMINIS MUSCLE N/A 6/11/2020    Procedure: BLOCK, TRANSVERSUS ABDOMINIS PLANE;  Surgeon: Leonardo Yang MD;  Location: [facility] OR;  Service: General;  Laterality: N/A;    KIDNEY SURGERY  2014    per patient separation of horseshoe kidney @ time of AAA repair    LEFT HEART CATHETERIZATION Left 3/7/2019    Procedure: CATHETERIZATION, HEART, LEFT;  Surgeon: Adriel Boone MD;  Location: [facility] CATH LAB;  Service: Cardiology;  Laterality: Left;  630 admit for IV hydration  10am start    LUNG LOBECTOMY Right 1970s    per patient not cancer    LYSIS OF ADHESIONS N/A 6/11/2020    Procedure: LYSIS, ADHESIONS;  Surgeon: Leonardo Yang MD;  Location: [facility] OR;  Service: General;  Laterality: N/A;    OMENTECTOMY N/A 6/11/2020    Procedure: OMENTECTOMY;  Surgeon: Leonardo Yang MD;  Location: [facility] OR;  Service: General;  Laterality: N/A;    right below knee amputation  2009 (approx)    SMALL INTESTINE SURGERY  2014    per patient partial @ time of aaa repair  not small bowel - large bowel bowel compromised bythtwe AAAbowel    SUBTOTAL COLECTOMY N/A 6/11/2020    Procedure: COLECTOMY, PARTIAL;   Surgeon: Leonardo Yang MD;  Location: [facility];  Service: General;  Laterality: N/A;    TONSILLECTOMY  1955 aprox    URETERAL STENT PLACEMENT Left     annually replaced since 2012 or so  Dr Jin       Time Tracking:     OT Date of Treatment: 06/15/20  OT Start Time: 0956  OT Stop Time: 1034  OT Total Time (min): 38 min    Billable Minutes:Evaluation 15 minutes  Self Care/Home Management 13 minutes  Therapeutic Activity 12 minutes    Radha Armstrong OT  6/15/2020

## 2020-06-15 NOTE — TELEPHONE ENCOUNTER
Patient scheduled to be contacted today on behalf of the Post Procedural Symptom Tracker. Patient admitted at this time. Per protocol, no additional contact required at this time.      Reason for Disposition   Patient already left for the hospital/clinic    Additional Information   Negative: Caller has already spoken with the PCP (or office), and has no further questions   Negative: Caller has already spoken with another triager and has no further questions   Negative: Caller has already spoken with another triager or PCP (or office), and has further questions and triager able to answer questions.   Negative: Cell phone out of range. Phone number verified.   Negative: Second attempt to contact family AND no contact made. Phone number verified.   Negative: Wrong number reached. Phone number verified.   Negative: Message left with person in household   Negative: Message left on unidentified voice mail. Phone number verified.   Negative: Message left on identified voicemail   Negative: No answer.  First attempt to contact caller.  Follow-up call scheduled within 15 minutes.   Negative: Busy signal.  First attempt to contact caller.  Follow-up call scheduled within 15 minutes.    Protocols used: NO CONTACT OR DUPLICATE CONTACT CALL-A-[place]

## 2020-06-15 NOTE — ASSESSMENT & PLAN NOTE
Admitted with c/o GI bleeding secondary to Colon cancer. S/P colon resection    --Daily CBC, CMP  --Transfuse for Hgb <7.0

## 2020-06-15 NOTE — HPI
70-year-old male with past medical history significant for chronic kidney disease, CHF, CVA, coronary artery disease, hypertension, hyperlipidemia, PVD with previous AAA repair who presented with complaints of hematochezia and melena since January intermittently.  He  recently underwent colonoscopy 06/02/2020 that showed malignant-appearing mass in the transverse colon and possibly descending colon.  The above masses were biopsied and confirmed adenocarcinoma.  Who CT abdomen and pelvis showed stable reticulonodular opacity within the right lower lobe otherwise stable abdomen and pelvis.  Oncology was consulted given the new diagnosis of colonic adenocarcinoma.

## 2020-06-15 NOTE — PLAN OF CARE
AAO x 4. Up with assistance. Colostomy noted. Midline incision and staples intact. VSS. Free from injury. Aseptic technique maintained. Pain controlled. Wife at bedside. IV heparin per orders. IV fluids. Telemetry active. NADN. Call light in reach. Chart check completed.

## 2020-06-15 NOTE — PROGRESS NOTES
[facility]  Colorectal Surgery  Progress Note    Subjective:     History of Present Illness:  70 y.o. male well known to me who is admitted to the hospital with a LGIB. Pt has known colonic adenocarcinoma which is likely his source of bleeding, as he had colonoscopy last week showing other polyps as well as known carcinoma. Pt has a PMHx significant for anemia, GERD, HTN, HLD, CKD, CHF, CVA, CAD and PVD with previous AAA repair who recently underwent a colonoscopy on 06/02/2020 where a malignant-appearing mass was seen in the transverse colon, possible descending colon and biopsied.  Biopsies did confirm this to be adenocarcinoma.  Patient states that he had been having both hematochezia and melena since January intermittently. Patient's last colonoscopy prior to this 1 was in February 2014.  Patient has significant past surgical history including multiple previous abdominal operations including AAA repair, AAA graft excision and right ax fem bypass with small-bowel resection.  He denies current fever, chills, nausea, vomiting.  Patient reports a significant past coronary history requiring stent placement and likely need for further stent/CABG but is unable to undergo at this time due to high risk nature and has chosen medical therapy. Surgery consulted for evaluation of colonic adenoCA with bleeding.    Post-Op Info:  Procedure(s) (LRB):  COLECTOMY, PARTIAL (N/A)  BLOCK, TRANSVERSUS ABDOMINIS PLANE (N/A)  LYSIS, ADHESIONS (N/A)  CREATION, COLOSTOMY (N/A)  OMENTECTOMY (N/A)   4 Days Post-Op     Interval History: No acute events overnight. No nausea or vomiting after NGT removal.    Medications:  Continuous Infusions:   dextrose 5 % and 0.45 % NaCl 75 mL/hr at 06/13/20 2333    heparin (porcine) in D5W 14 Units/kg/hr (06/15/20 1059)     Scheduled Meds:   amLODIPine  5 mg Oral Daily    atorvastatin  40 mg Oral Daily    carvediloL  25 mg Oral BID    isosorbide mononitrate  120 mg Oral Daily     nozaseptin   Each Nostril BID    pantoprazole  40 mg Intravenous Daily     PRN Meds:sodium chloride, sodium chloride, sodium chloride, sodium chloride, sodium chloride, acetaminophen, albuterol-ipratropium, diphenhydrAMINE, hydrALAZINE, HYDROmorphone, naloxone, ondansetron, promethazine (PHENERGAN) IVPB     Review of patient's allergies indicates:   Allergen Reactions    Morphine Itching     Objective:     Vital Signs (Most Recent):  Temp: 97.9 °F (36.6 °C) (06/15/20 1212)  Pulse: 65 (06/15/20 1212)  Resp: 18 (06/15/20 1212)  BP: (!) 155/72 (06/15/20 1212)  SpO2: 98 % (06/15/20 1212) Vital Signs (24h Range):  Temp:  [97.5 °F (36.4 °C)-98.5 °F (36.9 °C)] 97.9 °F (36.6 °C)  Pulse:  [63-78] 65  Resp:  [17-18] 18  SpO2:  [95 %-98 %] 98 %  BP: (146-180)/(72-82) 155/72     Weight: 75.2 kg (165 lb 12.6 oz)  Body mass index is 22.48 kg/m².    Intake/Output - Last 3 Shifts       06/13 0700 - 06/14 0659 06/14 0700 - 06/15 0659 06/15 0700 - 06/16 0659    P.O. 0 30 540    I.V. (mL/kg) 1514.8 (20.1) 1834.6 (24.4)     Blood       Other       Total Intake(mL/kg) 1514.8 (20.1) 1864.6 (24.8) 540 (7.2)    Urine (mL/kg/hr) 2350 (1.3) 1500 (0.8) 400 (0.8)    Drains 60 0     Stool  10 0    Total Output 2410 1510 400    Net -895.3 +354.6 +140           Urine Occurrence   1 x    Stool Occurrence  0 x 0 x          Physical Exam  Constitutional:       Appearance: He is well-developed.   HENT:      Head: Normocephalic and atraumatic.   Eyes:      Extraocular Movements: EOM normal.      Conjunctiva/sclera: Conjunctivae normal.   Neck:      Musculoskeletal: Normal range of motion.      Thyroid: No thyromegaly.   Cardiovascular:      Rate and Rhythm: Normal rate and regular rhythm.   Pulmonary:      Effort: Pulmonary effort is normal. No respiratory distress.   Abdominal:      Comments: Soft, nondistended, appropriately TTP; incision c/d/i without erythema or drainage; RUQ ostomy pink and moist with minor amount of stool and gas in bag    Musculoskeletal:         General: No tenderness or edema.      Comments: R foot s/p amputation   Skin:     General: Skin is warm and dry.      Capillary Refill: Capillary refill takes less than 2 seconds.      Findings: No rash.   Neurological:      Mental Status: He is alert and oriented to person, place, and time.   Psychiatric:         Mood and Affect: Mood and affect normal.         Significant Labs:  CBC:   Recent Labs   Lab 06/15/20  0615   WBC 5.43  5.43   RBC 3.45*  3.45*   HGB 9.4*  9.4*   HCT 30.0*  30.0*     189   MCV 87  87   MCH 27.2  27.2   MCHC 31.3*  31.3*     CMP:   Recent Labs   Lab 06/15/20  0615   *   CALCIUM 8.6*   ALBUMIN 2.7*   PROT 6.8      K 3.8   CO2 22*      BUN 11   CREATININE 1.5*   ALKPHOS 141*   ALT 14   AST 15   BILITOT 0.4         Assessment/Plan:     * Colon adenocarcinoma  71yo M with multiple medical comorbidities who presents with transverse vs descending colonic adenocarcinoma with possible metastatic lung disease with acute lower GI bleed, recurrent who is now s/p open partial transverse colectomy with end proximal transverse colostomy on 6/11/2020    - Trial clear liquids today  - cont IVF  - cont ostomy nurse evaluation/education  - okay to trial hep gtt prior to reinitiation of PO anticoagulation given previous LGIB to ensure this does not start again, discussed with primary team. Would hold on plavix or asa at this point until can ensure pt will tolerate diet and not rebleed on heparin  - OOB encouraged  - PPx: ppi    COPD (chronic obstructive pulmonary disease)  Management per primary team    Acute lower GI bleeding  See plan for colon adenocarcinoma    Coronary artery disease involving native coronary artery of native heart without angina pectoris  Management per primary team    Acute on chronic kidney failure  Management per primary team    PVD (peripheral vascular disease)  Management per primary team    Acute blood loss  anemia  Management per primary team    Essential hypertension  Management per primary team    Hyperlipidemia  Management per primary team        Leonardo Yang MD  Colorectal Surgery  [facility]

## 2020-06-15 NOTE — CONSULTS
Advance Care Planning    Consult Note  Palliative Care      Consult Requested By: Breana Carreon NP  Reason for Consult: Advance care planning      SUBJECTIVE:     History of Present Illness:  Kodi Franco is a 70 y.o. year old with a history of CKD stage IV, CAD, PAD s/p grafting and R BKA, HTN, and recent diagnosis of colon adenocarcinoma who presented to the emergency department complaining of hematochezia. He was admitted for further evaluation and underwent open partial colectomy with colostomy on 6/11/20. Lung nodules were noted on imaging and due to the recent cancer diagnosis these are concerning for metastatic disease. Oncology has been consulted and Mr. Franco has stated that he has no intention of pursuing cancer directed therapy if offered. In light of his recent cancer diagnosis without plans to pursue treatment Palliative Care was consulted to assist with advance care planning. I met Mr. Franco with his wife Laury at bedside. He reports he is feeling well and is eager to eat the lunch of clears that was delivered. He has expected postoperative pain with good control on the PRN regimen. I explained my role on the team and the benefit of ACP documents however I was interrupted before being able to finish. His wife interrupts him anytime he tries to talk about his wishes yet they seem to be on the same page. They both talked about his long history of medical illness and that his refusal of cancer directed therapy (chemotherapy) is because it is not an option due to his comorbidities. He feels that he is living on borrowed time and has already had many discussions about his end of life wishes with his wife and children. He names his wife Laury as surrogate and does not have an advance directive. He says that he wishes to be FULL code but says that he would not want to be sustained on life support for long but does not give a time period. He says that his wife is well aware of his wishes and feels  confident she would be able to redirect care and extubate if he had no chance of meaningful recovery. I asked what his goal would be for resuscitation since he has repeatedly told me that he knows he has multiple life limiting comorbidities that are at risk of worsening and ending his life. I recommended a DNR/ DNI since he has irreversible and life limiting diseases which would not be treated or cured with successful resuscitation but he wishes to remain FULL code for now. He is not interested in ACP completion and is not interested in Palliative Care follow up at discharge. His plan is to follow up with oncology to continue monitoring the lung nodules and if he declines or has exhausted all life prolonging options then understands that hospice would be beneficial in delivering comfort focused care. He and his wife thanked me for stopping by and will contact us if we can be of any assistance in the future.      Past Medical History:   Diagnosis Date    Acute on chronic congestive heart failure 1/13/2020    Acute respiratory failure with hypoxia 1/14/2020    Analgesic nephropathy     Anemia     AP (angina pectoris) 1/11/2019    Arthritis     Colon polyp     Repeat colonoscopy due in 9/14    COPD exacerbation 2/6/2020    Coronary artery disease     Diverticulosis     colonoscopy 2/21/2014    Dysthymia 2/13/2020    Encounter for blood transfusion     GERD (gastroesophageal reflux disease)     Hemorrhoids     colonoscopy 2/21/2014    Horseshoe kidney     Hyperglycemia 3/17/2014    Hyperlipidemia     Hypertension     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    Late complications of amputation stump     rseolved with further amputation( MRSA then none since 2014)    Lipoma of colon     colonoscopy 2/21/2014    Myocardial infarction     per patient 2000 & 9/2012    Peripheral vascular disease     Phantom limb syndrome     patient reports only intermittent not problematic, not worsening    S/P  aorto-bifemoral bypass surgery 3/17/2014    Spinal cord disease     L4L5 disc    Stroke     Tobacco dependence     resolved    Ureteral stent retained      Past Surgical History:   Procedure Laterality Date    ABDOMINAL AORTIC ANEURYSM REPAIR      ABDOMINAL AORTIC ANEURYSM REPAIR  1996/2014    AMPUTATION, LOWER LIMB      AORTA - BILATERAL FEMORAL ARTERY BYPASS GRAFT  2014    Left and right leg    COLONOSCOPY N/A 6/2/2020    Procedure: COLONOSCOPY;  Surgeon: Rosanna Harrington MD;  Location: [facility] ENDO;  Service: Endoscopy;  Laterality: N/A;    COLOSTOMY N/A 6/11/2020    Procedure: CREATION, COLOSTOMY;  Surgeon: Leonardo Yang MD;  Location: [facility] OR;  Service: General;  Laterality: N/A;    CORONARY ANGIOPLASTY WITH STENT PLACEMENT  2000    Three placed in heart    CYSTOSCOPY W/ RETROGRADES Left 5/29/2018    Procedure: CYSTOSCOPY, WITH RETROGRADE PYELOGRAM;  Surgeon: Scooter Jin IV, MD;  Location: [facility] OR;  Service: Urology;  Laterality: Left;    CYSTOSCOPY W/ URETERAL STENT PLACEMENT Left 5/29/2018    Procedure: CYSTOSCOPY, WITH URETERAL STENT INSERTION;  Surgeon: Scooter Jin IV, MD;  Location: [facility] OR;  Service: Urology;  Laterality: Left;    CYSTOSCOPY W/ URETERAL STENT PLACEMENT Left 2/4/2020    Procedure: CYSTOSCOPY, WITH URETERAL STENT INSERTION;  Surgeon: Scooter Jin IV, MD;  Location: [facility] OR;  Service: Urology;  Laterality: Left;    CYSTOSCOPY W/ URETERAL STENT REMOVAL Left 5/29/2018    Procedure: CYSTOSCOPY, WITH URETERAL STENT REMOVAL;  Surgeon: Scooter Jin IV, MD;  Location: [facility] OR;  Service: Urology;  Laterality: Left;    CYSTOSCOPY W/ URETERAL STENT REMOVAL Left 2/4/2020    Procedure: CYSTOSCOPY, WITH URETERAL STENT REMOVAL;  Surgeon: Scooter Jin IV, MD;  Location: [facility] OR;  Service: Urology;  Laterality: Left;    ESOPHAGOGASTRODUODENOSCOPY N/A 6/2/2020    Procedure: EGD (ESOPHAGOGASTRODUODENOSCOPY);  Surgeon: Rosanna Harrington MD;  Location: [facility] ENDO;   Service: Endoscopy;  Laterality: N/A;    FOOT AMPUTATION THROUGH METATARSAL  1996    left    FOOT SURGERY Bilateral 1980's    per patient multiple toe amputations prior to.  partial foot amputation:first great toe then other toes     INJECTION OF ANESTHETIC AGENT INTO TISSUE PLANE DEFINED BY TRANSVERSUS ABDOMINIS MUSCLE N/A 6/11/2020    Procedure: BLOCK, TRANSVERSUS ABDOMINIS PLANE;  Surgeon: Leonardo Yang MD;  Location: [facility] OR;  Service: General;  Laterality: N/A;    KIDNEY SURGERY  2014    per patient separation of horseshoe kidney @ time of AAA repair    LEFT HEART CATHETERIZATION Left 3/7/2019    Procedure: CATHETERIZATION, HEART, LEFT;  Surgeon: Adriel Boone MD;  Location: [facility] CATH LAB;  Service: Cardiology;  Laterality: Left;  630 admit for IV hydration  10am start    LUNG LOBECTOMY Right 1970s    per patient not cancer    LYSIS OF ADHESIONS N/A 6/11/2020    Procedure: LYSIS, ADHESIONS;  Surgeon: Leonardo Yang MD;  Location: [facility] OR;  Service: General;  Laterality: N/A;    OMENTECTOMY N/A 6/11/2020    Procedure: OMENTECTOMY;  Surgeon: Leonardo Yang MD;  Location: [facility] OR;  Service: General;  Laterality: N/A;    right below knee amputation  2009 (approx)    SMALL INTESTINE SURGERY  2014    per patient partial @ time of aaa repair  not small bowel - large bowel bowel compromised bythtwe AAAbowel    SUBTOTAL COLECTOMY N/A 6/11/2020    Procedure: COLECTOMY, PARTIAL;  Surgeon: Leonardo Yang MD;  Location: [facility] OR;  Service: General;  Laterality: N/A;    TONSILLECTOMY  1955 aprox    URETERAL STENT PLACEMENT Left     annually replaced since 2012 or so  Dr Jin     Family History   Problem Relation Age of Onset    Cancer Mother         lung    COPD Mother     Heart disease Father         MI but per patient bc of old age    Diabetes Daughter     Eczema Neg Hx     Lupus Neg Hx     Psoriasis Neg Hx     Melanoma Neg Hx     Kidney disease Neg Hx     Stroke Neg Hx      Mental illness Neg Hx     Mental retardation Neg Hx     Hypertension Neg Hx     Hyperlipidemia Neg Hx     Drug abuse Neg Hx     Alcohol abuse Neg Hx     Depression Neg Hx      Social History     Socioeconomic History    Marital status:      Spouse name: Laury    Number of children: 2    Years of education: Not on file    Highest education level: Not on file   Occupational History    Occupation: Retired      Comment: [employer]   Social Needs    Financial resource strain: Not on file    Food insecurity     Worry: Not on file     Inability: Not on file    Transportation needs     Medical: Not on file     Non-medical: Not on file   Tobacco Use    Smoking status: Former Smoker     Packs/day: 1.00     Years: 15.00     Pack years: 15.00     Quit date: 2009     Years since quittin.4    Smokeless tobacco: Never Used   Substance and Sexual Activity    Alcohol use: No    Drug use: No     Comment: Is on prescription opiod, no non prescribed use    Sexual activity: Not Currently     Partners: Female   Lifestyle    Physical activity     Days per week: Not on file     Minutes per session: Not on file    Stress: Not on file   Relationships    Social connections     Talks on phone: Not on file     Gets together: Not on file     Attends Zoroastrian service: Not on file     Active member of club or organization: Not on file     Attends meetings of clubs or organizations: Not on file     Relationship status: Not on file   Other Topics Concern    Not on file   Social History Narrative     . 4 children alive and well. 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  Still drives. Does not have a Living Will.      Review of patient's allergies indicates:   Allergen Reactions    Morphine Itching       Medications:    Current Facility-Administered Medications:     0.9%  NaCl infusion (for blood administration), , Intravenous, Q24H PRN,  Leonardo Yang MD    0.9%  NaCl infusion (for blood administration), , Intravenous, Q24H PRN, Leonardo Yang MD    0.9%  NaCl infusion (for blood administration), , Intravenous, Q24H PRN, Leonardo Yang MD    0.9%  NaCl infusion (for blood administration), , Intravenous, Q24H PRN, Leonardo Yang MD    0.9%  NaCl infusion (for blood administration), , Intravenous, Q24H PRN, Leonardo Yang MD    acetaminophen tablet 650 mg, 650 mg, Oral, Q6H PRN, Leonardo Yang MD    albuterol-ipratropium 2.5 mg-0.5 mg/3 mL nebulizer solution 3 mL, 3 mL, Nebulization, Q6H PRN, Leonardo Yang MD    amLODIPine tablet 5 mg, 5 mg, Oral, Daily, Ruben Hayes MD, 5 mg at 06/15/20 0829    atorvastatin tablet 40 mg, 40 mg, Oral, Daily, Leonardo Yang MD, 40 mg at 06/15/20 0829    carvediloL tablet 25 mg, 25 mg, Oral, BID, Ruben Hayes MD, 25 mg at 06/15/20 0830    dextrose 5 % and 0.45 % NaCl infusion, , Intravenous, Continuous, Leonardo Yang MD, Last Rate: 75 mL/hr at 06/13/20 2333    diphenhydrAMINE capsule 25 mg, 25 mg, Oral, Q6H PRN, Leonardo Yang MD, 25 mg at 06/15/20 0829    heparin 25,000 units in dextrose 5% 250 ml (100 units/mL) infusion MINIMAL INTENSITY nomogram - [facility], 12 Units/kg/hr (Adjusted), Intravenous, Continuous, Lizy Rodriguez NP, Last Rate: 10.5 mL/hr at 06/15/20 1059, 14 Units/kg/hr at 06/15/20 1059    hydrALAZINE injection 10 mg, 10 mg, Intravenous, Q8H PRN, Ruben Hayes MD    HYDROmorphone injection 0.4 mg, 0.4 mg, Intravenous, Q2H PRN, Leonardo Yang MD, 0.4 mg at 06/15/20 1052    isosorbide mononitrate 24 hr tablet 120 mg, 120 mg, Oral, Daily, Coni Salgado, MILENA, 120 mg at 06/15/20 0829    naloxone 0.4 mg/mL injection 0.02 mg, 0.02 mg, Intravenous, PRN, Leonardo Yang MD    nozaseptin (NOZIN) nasal , , Each Nostril, BID, Leonardo Yang MD    ondansetron injection 4 mg, 4 mg, Intravenous, Q8H PRN, Leonardo Yang MD, 4  mg at 06/09/20 1142    pantoprazole injection 40 mg, 40 mg, Intravenous, Daily, Leonardo Yang MD, 40 mg at 06/15/20 0829    promethazine (PHENERGAN) 6.25 mg in dextrose 5 % 50 mL IVPB, 6.25 mg, Intravenous, Q6H PRN, Leonardo Yang MD        OBJECTIVE:   Symptom Assessment (ESAS 0-10 scale)     ESAS 0 1 2 3 4 5 6 7 8 9 10   Pain   X           Dyspnea X             Anxiety              Nausea X             Depression               Anorexia X             Fatigue              Insomnia X             Restlessness               Agitation              Constipation    no  Bowel Management Plan (BMP): no  Diarrhea        no  Comments: gas in ostomy    ECOG Performance Status Grade: 2 - Ambulates, capable of self care only    ROS:  Review of Systems   Constitutional: Positive for appetite change. Negative for activity change and fever.   HENT: Negative for sore throat and trouble swallowing.    Eyes: Positive for itching. Negative for visual disturbance.   Respiratory: Negative for cough and shortness of breath.    Cardiovascular: Negative for chest pain and leg swelling.   Gastrointestinal: Positive for abdominal pain (expected postoperative). Negative for abdominal distention, diarrhea, nausea and vomiting.   Musculoskeletal: Positive for back pain (positional) and gait problem. Negative for arthralgias.   Skin: Negative for rash and wound.   Neurological: Negative for speech difficulty and headaches.   Psychiatric/Behavioral: Negative for confusion, decreased concentration and sleep disturbance.       Physical Exam:  Vitals: Temp: 97.9 °F (36.6 °C) (06/15/20 1212)  Pulse: 65 (06/15/20 1212)  Resp: 18 (06/15/20 1212)  BP: (!) 155/72 (06/15/20 1212)  SpO2: 98 % (06/15/20 1212)    Gen: well-developed, well-nourished, NAD, pale  Head: atraumatic, normocephalic  Eyes: conjuctiva and sclera clear, EOMI, no discharge or tearing but red from rubbing with washcloth  Ears: hearing grossly intact with no external  abnormality  Mouth: poor dentition, MM moist  Respiratory: CTAB, no wheezing or rhonchi  Heart: RRR, no murmur  Abdomen: soft, appropriate postoperative tenderness, nondistended, normoactive BS  Pulses: 1+ in DP of LLE  Extremities: no edema in LLE, s/p R BKA  Neurologic: no focal deficits, CN II-XII grossly intact, normal coordination  Skin: no rashes or lesions  Psych: cooperative, normal mood and affect, normal attention span and concentration, normal memory, fund of knowledge appropriate     Labs:  CBC:   WBC   Date Value Ref Range Status   06/15/2020 5.43 3.90 - 12.70 K/uL Final   06/15/2020 5.43 3.90 - 12.70 K/uL Final     Hemoglobin   Date Value Ref Range Status   06/15/2020 9.4 (L) 14.0 - 18.0 g/dL Final   06/15/2020 9.4 (L) 14.0 - 18.0 g/dL Final     POC Hematocrit   Date Value Ref Range Status   03/16/2014 28 (L) 36 - 54 %PCV Final     Hematocrit   Date Value Ref Range Status   06/15/2020 30.0 (L) 40.0 - 54.0 % Final   06/15/2020 30.0 (L) 40.0 - 54.0 % Final     Mean Corpuscular Volume   Date Value Ref Range Status   06/15/2020 87 82 - 98 fL Final   06/15/2020 87 82 - 98 fL Final     Platelets   Date Value Ref Range Status   06/15/2020 189 150 - 350 K/uL Final   06/15/2020 189 150 - 350 K/uL Final       BMP  Lab Results   Component Value Date     06/15/2020    K 3.8 06/15/2020     06/15/2020    CO2 22 (L) 06/15/2020    BUN 11 06/15/2020    CREATININE 1.5 (H) 06/15/2020    CALCIUM 8.6 (L) 06/15/2020    ANIONGAP 7 (L) 06/15/2020    ESTGFRAFRICA 53 (A) 06/15/2020    EGFRNONAA 46 (A) 06/15/2020       LFT:   Lab Results   Component Value Date    AST 15 06/15/2020    ALKPHOS 141 (H) 06/15/2020    BILITOT 0.4 06/15/2020       Albumin:   Albumin   Date Value Ref Range Status   06/15/2020 2.7 (L) 3.5 - 5.2 g/dL Final         Radiology:I have reviewed all pertinent imaging results/findings within the past 24 hours.  - CT abdomen/ pelvis 6/4/20:  1.  There are clips within the cecum, with wall thickening  present.  This possibly corresponds to the history given of colon neoplasm.  Clinical correlation is advised.     2.  There are 3 noncalcified pulmonary nodules, measuring between 6 and 8 mm in the middle lobe and left upper lobe.  Considering the patient's history, early metastasis must be considered.     3.  Negative for acute process within the chest, abdomen and pelvis.     4.  Numerous stable and nonemergent findings    ASSESSMENT   Kodi Franco is a 70 y.o. year old with a history of CKD stage IV, CAD, PAD s/p grafting and R BKA, HTN, and recent diagnosis of colon adenocarcinoma who presented to the emergency department complaining of hematochezia. He was admitted for further evaluation and underwent open partial colectomy with colostomy on 6/11/20. Lung nodules were noted on imaging and due to the recent cancer diagnosis these are concerning for metastatic disease. Oncology has been consulted and Mr. Franco has stated that he has no intention of pursuing cancer directed therapy if offered. In light of his recent cancer diagnosis without plans to pursue treatment Palliative Care was consulted to assist with advance care planning.    PLAN   1. Encounter for Palliative Care  - Code status: FULL (I recommended DNR/ DNI in light of our discussion above)  - Surrogate: wife Laury  - He is not interested in completing ACP  - He is not interested in PC follow up    2. Colon adenocarcinoma  - s/p resection and colostomy  - He plans to follow up with oncology for surveillance of lung nodules  - He reports that cancer directed therapy is not an option for him even if he were willing due to comorbidities  - Defer to surgery and oncology    3. Acute postoperative pain  - Denies chronic pain and pain is controlled on current PRN regimen      Discussed case and visit details with Breana Carreon NP.     Thank you for allowing Palliative care to be involved in the care of Kodi Franco.  As his goal is established and  he has no interest in completing ACP I will sign off. Please do not hesitate to contact me if I can be of further assistance.        Medical decision making: HIGH based on high risk of death, high risk medications, management of more than one chronic illness in exacerbation    16 min ACP time spent discussing: ACP documents and benefits, code status, and goals of care.      Tika Villaseñor PA-C  Palliative Care

## 2020-06-15 NOTE — ASSESSMENT & PLAN NOTE
Status post open partial transverse colectomy with end proximal transverse colostomy on 06/11/2020.  -postsurgical management per surgery service.  -discussed with patient the natural history and prognosis of metastatic colonic adenocarcinoma.  Patient remains adamant about refusal of chemotherapy.  -recommend outpatient follow-up for further discussion.  --Consult palliative care

## 2020-06-15 NOTE — PROGRESS NOTES
[facility] Hospital Medicine  Progress Note    Patient Name: Kodi Ribeiro  MRN: [id number]  Patient Class: IP- Inpatient   Admission Date: 6/8/2020  Length of Stay: 6 days  Attending Physician: Mimi Marshall MD  Primary Care Provider: Norma Nuñez MD    Subjective:     Principal Problem:Colon adenocarcinoma        HPI:  Kodi Ribeiro is a 70 y.o. male patient with a PMHx of CVA, CHF, COPD, CAD, diverticulosis, GERD, PVD, hyperglycemia, HLD, HTN, Anemia, and MI who presents to the Emergency Department for evaluation of rectal bleeding which onset gradually earlier today. Pt reports having had a bloody stool this morning. Pt states that he was on Plavix, but was stopped by his PCP 10 days ago when he had similar sxs but has continued to take ASA. Pt had a hx of hematochezia since 01/2020 and had colonoscopy last Tuesday with colon cancer diagnosis. Pt seen by Dr. Wilkerson (General Surgery) as outpatient yesterday with possible options for surgical intervention discussed.  Symptoms are intermittent and moderate in severity. No mitigating or exacerbating factors reported. Associated sxs include constipation, hematochezia, abdominal pain when making a BM, diaphoresis, chills, and syncope.  Patient denies any CP, hematuria, N/V, SOB, light-headedness, and all other sxs at this time. No prior Tx reported. No further complaints or concerns at this time.  Pt denies smoking and use of ETOH.  Pt is a full code and Laury STEWARD'Connnor (wife) at [phone] is the surrogate decision maker.  ER work up showed: H/H 7.5/24.4, CO2 16, Ca 8.2, BUN/Creatinine 44/3.2, Glucose 159, and Albumin 3.3.  CT abdomen/pelvis performed and CT of chest/abdomen/pelvis results on 6/5/2020 reviewed.  ER discussed case with GI and General Surgery.  Hospital Medicine contacted for admission with patient placed in Observation for further evaluation.      Overview/Hospital Course:  6/10/20: No events; Last BM was yesterday AM  (BRBPR), no BM since, which he attributes to being NPO, but today was on clears and is NPO after MN for scheduled general surgery for possible resection of adenocarcinoma in AM. No cp or sob.  - Ordered 2 more units RBCs (completed a total of 3U yesterday).   6/11/20: s/p partial bowel resection with colostomy; tolerated surgery well without obvious intra-operative complications  - Pain control  - Continue to monitor closely  6/12/20: POD#2; no events o/n; sBP's 153-182 range, which is likely 2/2 pain, which he describes as abn tenderness w/ movement  6/13/20-doing well. NGT clamped. Passing some gas.   As of 6/14/2020 NGT removed, but will remain NPO except some ice chips. Will place order for PT/OT to encourage mobility. Creatinine continues to trend down. No gas noted from colostomy.   6/15/20  Diet advanced to clear liquid diet. Tolerating heparin infusion without bleeding complication. Ambulated down hallway with PT/OT.     Interval History: doing well with clear liquid diet.    Review of Systems   Constitutional: Negative for fever.   HENT: Negative for hearing loss.         NGT clamped     Eyes: Negative for visual disturbance.   Respiratory: Negative for cough and shortness of breath.    Cardiovascular: Negative for chest pain and palpitations.   Gastrointestinal: Positive for abdominal pain (tenderness when moving).        Painful as expected following partial bowel resection   Genitourinary: Negative for difficulty urinating, dysuria, frequency and hematuria.   Musculoskeletal: Negative for arthralgias and back pain.   Skin: Negative for color change and rash.   Neurological: Positive for weakness. Negative for seizures, syncope, light-headedness, numbness and headaches.   Hematological: Bruises/bleeds easily.   Psychiatric/Behavioral: The patient is not nervous/anxious.       Objective:     Vital Signs (Most Recent):  Temp: 97.8 °F (36.6 °C) (06/15/20 1620)  Pulse: 69 (06/15/20 1620)  Resp: 16 (06/15/20  1620)  BP: (!) 157/79 (06/15/20 1620)  SpO2: 97 % (06/15/20 1620) Vital Signs (24h Range):  Temp:  [97.5 °F (36.4 °C)-98.5 °F (36.9 °C)] 97.8 °F (36.6 °C)  Pulse:  [63-78] 69  Resp:  [16-18] 16  SpO2:  [95 %-98 %] 97 %  BP: (146-180)/(72-82) 157/79     Weight: 75.2 kg (165 lb 12.6 oz)  Body mass index is 22.48 kg/m².    Intake/Output Summary (Last 24 hours) at 6/15/2020 1701  Last data filed at 6/15/2020 1238  Gross per 24 hour   Intake 2404.63 ml   Output 1450 ml   Net 954.63 ml      Physical Exam  Constitutional:       Appearance: He is well-developed.   HENT:      Head: Normocephalic and atraumatic.   Eyes:      Extraocular Movements: EOM normal.      Conjunctiva/sclera: Conjunctivae normal.   Neck:      Musculoskeletal: Normal range of motion.      Thyroid: No thyromegaly.   Cardiovascular:      Rate and Rhythm: Normal rate and regular rhythm.   Pulmonary:      Effort: Pulmonary effort is normal. No respiratory distress.   Abdominal:      Comments: Soft, nondistended, appropriately TTP; incision c/d/i without erythema or drainage; RUQ ostomy pink and moist with minor amount of stool and gas in bag   Musculoskeletal:         General: No tenderness or edema.      Comments: S/p right BKA   Skin:     General: Skin is warm and dry.      Capillary Refill: Capillary refill takes less than 2 seconds.      Findings: No rash.   Neurological:      Mental Status: He is alert and oriented to person, place, and time.   Psychiatric:         Mood and Affect: Mood and affect normal.       Significant Labs:   CBC:   Recent Labs   Lab 06/14/20  0524 06/14/20  1522 06/15/20  0615   WBC 6.97 6.57 5.43  5.43   HGB 9.4* 9.0* 9.4*  9.4*   HCT 31.5* 28.9* 30.0*  30.0*    173 189  189     CMP:   Recent Labs   Lab 06/14/20  0524 06/15/20  0615    139   K 4.1 3.8    110   CO2 21* 22*   * 111*   BUN 12 11   CREATININE 1.6* 1.5*   CALCIUM 8.4* 8.6*   PROT 6.5 6.8   ALBUMIN 2.7* 2.7*   BILITOT 0.5 0.4   ALKPHOS  133 141*   AST 18 15   ALT 14 14   ANIONGAP 9 7*   EGFRNONAA 43* 46*     Significant Imaging: I have reviewed all pertinent imaging results/findings within the past 24 hours.      Assessment/Plan:      * Colon adenocarcinoma  -pt seen outpatient by Dr. Yang on yesterday   -recent colonoscopy per GI on last Thursday   -options for possible surgical intervention   -Cardiology consulted   -EKG, Echo, and chest xray pending   -antiemetics as needed   -analgesia as needed     6/13/2020  Oncology input appreciated.   Patient is not interested in chemotherapy  General surgery managing postoperatively.    6/14/20  General surgery managing.   NGT has been removed.    Pulmonary nodules  -imaging on 6/5/2020 supports 3 noncalcified pulmonary nodules, measuring between 6 and 8 mm in the middle lobe and left upper lobe.  Due to history early metastasis must be considered.  -pt to benefit from outpatient follow up and further evaluation     COPD (chronic obstructive pulmonary disease)  -Duonebs as needed   -supplemental oxygen as needed       Acute lower GI bleeding  -in the setting of adenocarcinoma   -recent colonoscopy with clips within the cecum, with wall thickening present corresponding to the colon neoplasm  -Plavix held 10 days ago by PCP   -symptoms present intermittently since 01/2020  -ASA held   -PPI   -case discussed with GI with symptoms likely due to colon adenocarcinoma and no further recommendations   -General Surgery on consult   -serial H/H in progress   -downward trend noted with PRBCs transfused x 1 unit in ED and additional unit in progress  6/14/20  Stable. Will plan to start heparin sub q if okay with Gigila. Continue to hold ASA and Plavix      Coronary artery disease involving native coronary artery of native heart without angina pectoris  -ASA and Plavix held due to bleeding   -will resume Coreg when appropriate   -Statin and Imdur continued   -s/p stents x 3 in 2000 and MI     Acute on chronic kidney  failure  BUN/Creatinine 44/3.2   -in the setting of symptomatic anemia   -baseline 1.4-2.5  -blood transfusion in progress   -repeat CMP in am   6/10/20: Slightly improved sCr from yesterday (3.2 -> 3.0), which may slightly improve further with more blood  6/11/20: sCr further improved from 3.0>2.1 (eGFR 20>31)  6/14/20 continues to improve  6/15/20 improving    PVD (peripheral vascular disease)  -hx of   -s/p fem-fem bypass graft and left SFA   -pt was followed outpatient by Dr. Jamil ([facility])  -ASA and Plavix on hold due to bleeding       Acute blood loss anemia  -in the setting of lower GI Bleed   -ASA and Plavix on hold   -H/H 7.5/24.4   -PRBCs transfused x 1 unit with current unit in progress   -serial H/H's in progress-will continue to transfuse as needed   -supplemental oxygen as needed   H&H stable over the past 1 day; no gross BRBPR since yesterday AM; s/p 3U PRBCs  6/11/20: Hgb 8.9  -monitor closely and tfx prn  6/14/20  stable    Essential hypertension  -stable   -will hold antihypertensives due to active bleed and resume as appropriate   6/10/20: Restarted a lower dose BB per card recs given BP and HR being able to tolerate given plan for surgery tomorrow  6/11/20: Higher BP's today likely partially due to pain  - Hydralazine 10 mg IV q8H prn added  - Carvedilol dose increased from 12.5 mg po bid to 25 mg po bid  6/12/20: sBPs 153-182, which is likely 2/2 POD#2 pain despite PCA pump  -Amlodipine 5 mg po qd added   6/14/20  Still slightly elevated; expected to decrease once fluids are discontinued.  6/15/20  Stable    Hyperlipidemia  -Statin       VTE Risk Mitigation (From admission, onward)         Ordered     heparin 25,000 units in dextrose 5% 250 ml (100 units/mL) infusion MINIMAL INTENSITY nomogram - [facility]  Continuous     Question:  Heparin Infusion Adjustment (DO NOT MODIFY ANSWER)  Answer:  [email or web address] MINIMAL  INTENSITY nomogram for [facility]  [email or web address]    06/14/20 1516                Lizy Rodriguez NP  Department of Hospital Medicine   [facility]

## 2020-06-15 NOTE — SUBJECTIVE & OBJECTIVE
Interval History: No acute events overnight. No nausea or vomiting after NGT removal.    Medications:  Continuous Infusions:   dextrose 5 % and 0.45 % NaCl 75 mL/hr at 06/13/20 2333    heparin (porcine) in D5W 14 Units/kg/hr (06/15/20 1059)     Scheduled Meds:   amLODIPine  5 mg Oral Daily    atorvastatin  40 mg Oral Daily    carvediloL  25 mg Oral BID    isosorbide mononitrate  120 mg Oral Daily    nozaseptin   Each Nostril BID    pantoprazole  40 mg Intravenous Daily     PRN Meds:sodium chloride, sodium chloride, sodium chloride, sodium chloride, sodium chloride, acetaminophen, albuterol-ipratropium, diphenhydrAMINE, hydrALAZINE, HYDROmorphone, naloxone, ondansetron, promethazine (PHENERGAN) IVPB     Review of patient's allergies indicates:   Allergen Reactions    Morphine Itching     Objective:     Vital Signs (Most Recent):  Temp: 97.9 °F (36.6 °C) (06/15/20 1212)  Pulse: 65 (06/15/20 1212)  Resp: 18 (06/15/20 1212)  BP: (!) 155/72 (06/15/20 1212)  SpO2: 98 % (06/15/20 1212) Vital Signs (24h Range):  Temp:  [97.5 °F (36.4 °C)-98.5 °F (36.9 °C)] 97.9 °F (36.6 °C)  Pulse:  [63-78] 65  Resp:  [17-18] 18  SpO2:  [95 %-98 %] 98 %  BP: (146-180)/(72-82) 155/72     Weight: 75.2 kg (165 lb 12.6 oz)  Body mass index is 22.48 kg/m².    Intake/Output - Last 3 Shifts       06/13 0700 - 06/14 0659 06/14 0700 - 06/15 0659 06/15 0700 - 06/16 0659    P.O. 0 30 540    I.V. (mL/kg) 1514.8 (20.1) 1834.6 (24.4)     Blood       Other       Total Intake(mL/kg) 1514.8 (20.1) 1864.6 (24.8) 540 (7.2)    Urine (mL/kg/hr) 2350 (1.3) 1500 (0.8) 400 (0.8)    Drains 60 0     Stool  10 0    Total Output 2410 1510 400    Net -895.3 +354.6 +140           Urine Occurrence   1 x    Stool Occurrence  0 x 0 x          Physical Exam  Constitutional:       Appearance: He is well-developed.   HENT:      Head: Normocephalic and atraumatic.   Eyes:      Extraocular Movements: EOM normal.      Conjunctiva/sclera: Conjunctivae normal.   Neck:       Musculoskeletal: Normal range of motion.      Thyroid: No thyromegaly.   Cardiovascular:      Rate and Rhythm: Normal rate and regular rhythm.   Pulmonary:      Effort: Pulmonary effort is normal. No respiratory distress.   Abdominal:      Comments: Soft, nondistended, appropriately TTP; incision c/d/i without erythema or drainage; RUQ ostomy pink and moist with minor amount of stool and gas in bag   Musculoskeletal:         General: No tenderness or edema.      Comments: R foot s/p amputation   Skin:     General: Skin is warm and dry.      Capillary Refill: Capillary refill takes less than 2 seconds.      Findings: No rash.   Neurological:      Mental Status: He is alert and oriented to person, place, and time.   Psychiatric:         Mood and Affect: Mood and affect normal.         Significant Labs:  CBC:   Recent Labs   Lab 06/15/20  0615   WBC 5.43  5.43   RBC 3.45*  3.45*   HGB 9.4*  9.4*   HCT 30.0*  30.0*     189   MCV 87  87   MCH 27.2  27.2   MCHC 31.3*  31.3*     CMP:   Recent Labs   Lab 06/15/20  0615   *   CALCIUM 8.6*   ALBUMIN 2.7*   PROT 6.8      K 3.8   CO2 22*      BUN 11   CREATININE 1.5*   ALKPHOS 141*   ALT 14   AST 15   BILITOT 0.4

## 2020-06-15 NOTE — SUBJECTIVE & OBJECTIVE
Interval History: Patient denies any new complaints. Negative for acute events overnight. Plan is for discharge to rehab. Patient states he does not desire to have chemotherapy for treatment for colon cancer. States he will be seen as outpatient to discuss further.     Oncology Treatment Plan:   [No treatment plan]    Medications:  Continuous Infusions:   dextrose 5 % and 0.45 % NaCl 75 mL/hr at 06/13/20 2333    heparin (porcine) in D5W 14 Units/kg/hr (06/15/20 1059)     Scheduled Meds:   amLODIPine  5 mg Oral Daily    atorvastatin  40 mg Oral Daily    carvediloL  25 mg Oral BID    isosorbide mononitrate  120 mg Oral Daily    nozaseptin   Each Nostril BID    pantoprazole  40 mg Intravenous Daily     PRN Meds:sodium chloride, sodium chloride, sodium chloride, sodium chloride, sodium chloride, acetaminophen, albuterol-ipratropium, diphenhydrAMINE, hydrALAZINE, HYDROmorphone, naloxone, ondansetron, promethazine (PHENERGAN) IVPB     Review of Systems   Constitutional: Positive for activity change, appetite change and fatigue. Negative for chills, diaphoresis, fever and unexpected weight change.   HENT: Negative for congestion, hearing loss, nosebleeds, postnasal drip, sinus pressure, sneezing, sore throat and trouble swallowing.    Eyes: Negative for discharge and visual disturbance.   Respiratory: Negative for cough, chest tightness and shortness of breath.    Cardiovascular: Negative for chest pain and palpitations.   Gastrointestinal: Positive for abdominal distention and constipation. Negative for abdominal pain, diarrhea, nausea and vomiting.   Endocrine: Negative for cold intolerance and heat intolerance.   Genitourinary: Negative for difficulty urinating, dysuria, flank pain and hematuria.   Musculoskeletal: Positive for arthralgias, back pain and gait problem. Negative for myalgias.   Skin: Negative.    Neurological: Negative for dizziness and weakness.   Hematological: Negative for adenopathy. Does not  bruise/bleed easily.   Psychiatric/Behavioral: Negative for agitation, behavioral problems and confusion. The patient is nervous/anxious.      Objective:     Vital Signs (Most Recent):  Temp: 98.4 °F (36.9 °C) (06/15/20 0730)  Pulse: 70 (06/15/20 0730)  Resp: 18 (06/15/20 0730)  BP: (!) 168/79 (06/15/20 0943)  SpO2: 97 % (06/15/20 0730) Vital Signs (24h Range):  Temp:  [97.5 °F (36.4 °C)-98.5 °F (36.9 °C)] 98.4 °F (36.9 °C)  Pulse:  [63-78] 70  Resp:  [17-18] 18  SpO2:  [95 %-97 %] 97 %  BP: (143-180)/(67-82) 168/79     Weight: 75.2 kg (165 lb 12.6 oz)  Body mass index is 22.48 kg/m².  Body surface area is 1.95 meters squared.      Intake/Output Summary (Last 24 hours) at 6/15/2020 1133  Last data filed at 6/15/2020 0828  Gross per 24 hour   Intake 1864.63 ml   Output 1450 ml   Net 414.63 ml       Physical Exam  Vitals signs and nursing note reviewed.   Constitutional:       General: He is not in acute distress.     Appearance: He is well-developed and well-nourished. He is ill-appearing. He is not diaphoretic.   HENT:      Head: Normocephalic and atraumatic.      Right Ear: Hearing and external ear normal.      Left Ear: Hearing and external ear normal.      Nose: Nose normal. No mucosal edema, rhinorrhea or epistaxis.      Mouth/Throat:      Mouth: Oropharynx is clear and moist and mucous membranes are normal.      Pharynx: Uvula midline.   Eyes:      General:         Right eye: No discharge.         Left eye: No discharge.      Extraocular Movements: EOM normal.      Conjunctiva/sclera: Conjunctivae normal.      Right eye: No chemosis.     Left eye: No chemosis.     Pupils: Pupils are equal, round, and reactive to light.   Neck:      Musculoskeletal: Normal range of motion and neck supple.      Thyroid: No thyroid mass or thyromegaly.      Trachea: Trachea normal.   Cardiovascular:      Rate and Rhythm: Normal rate and regular rhythm.      Pulses: Intact distal pulses.      Heart sounds: Normal heart sounds. No  murmur.   Pulmonary:      Effort: Pulmonary effort is normal. No respiratory distress.      Breath sounds: Examination of the right-lower field reveals decreased breath sounds. Examination of the left-lower field reveals decreased breath sounds. Decreased breath sounds present. No wheezing.   Abdominal:      General: Bowel sounds are decreased. There is distension.      Palpations: Abdomen is soft.      Tenderness: There is no abdominal tenderness.   Musculoskeletal: Normal range of motion.        Legs:    Feet:      Right foot:      Amputation: Right leg is amputated below knee.   Lymphadenopathy:      Cervical: No cervical adenopathy.      Upper Body:      Right upper body: No supraclavicular adenopathy.      Left upper body: No supraclavicular adenopathy.   Skin:     General: Skin is warm and dry.      Capillary Refill: Capillary refill takes less than 2 seconds.      Findings: No rash.   Neurological:      Mental Status: He is alert and oriented to person, place, and time.   Psychiatric:         Mood and Affect: Mood is anxious.         Speech: Speech normal.         Behavior: Behavior normal.         Thought Content: Thought content normal.         Judgment: Judgment normal.         Significant Labs:   CBC:   Recent Labs   Lab 06/14/20  0524 06/14/20  1522 06/15/20  0615   WBC 6.97 6.57 5.43  5.43   HGB 9.4* 9.0* 9.4*  9.4*   HCT 31.5* 28.9* 30.0*  30.0*    173 189  189    and CMP:   Recent Labs   Lab 06/14/20  0524 06/15/20  0615    139   K 4.1 3.8    110   CO2 21* 22*   * 111*   BUN 12 11   CREATININE 1.6* 1.5*   CALCIUM 8.4* 8.6*   PROT 6.5 6.8   ALBUMIN 2.7* 2.7*   BILITOT 0.5 0.4   ALKPHOS 133 141*   AST 18 15   ALT 14 14   ANIONGAP 9 7*   EGFRNONAA 43* 46*       Diagnostic Results:  I have reviewed all pertinent imaging results/findings within the past 24 hours.

## 2020-06-15 NOTE — PLAN OF CARE
Patient currently requires CGA for bed mobility, min assist for transfers with RW, and min assist for ambulation 100 feet with RW.

## 2020-06-16 PROBLEM — K92.2 ACUTE LOWER GI BLEEDING: Status: RESOLVED | Noted: 2020-06-09 | Resolved: 2020-06-16

## 2020-06-16 LAB
APTT BLDCRRT: 44.4 SEC (ref 21–32)
MAGNESIUM SERPL-MCNC: 1.8 MG/DL (ref 1.6–2.6)
PHOSPHATE SERPL-MCNC: 3.5 MG/DL (ref 2.7–4.5)

## 2020-06-16 PROCEDURE — 85730 THROMBOPLASTIN TIME PARTIAL: CPT | Mod: HCNC

## 2020-06-16 PROCEDURE — 63600175 PHARM REV CODE 636 W HCPCS: Mod: HCNC | Performed by: COLON & RECTAL SURGERY

## 2020-06-16 PROCEDURE — 25000003 PHARM REV CODE 250: Mod: HCNC | Performed by: COLON & RECTAL SURGERY

## 2020-06-16 PROCEDURE — 25000003 PHARM REV CODE 250: Mod: HCNC | Performed by: NURSE PRACTITIONER

## 2020-06-16 PROCEDURE — S5010 5% DEXTROSE AND 0.45% SALINE: HCPCS | Mod: HCNC | Performed by: COLON & RECTAL SURGERY

## 2020-06-16 PROCEDURE — 21400001 HC TELEMETRY ROOM: Mod: HCNC

## 2020-06-16 PROCEDURE — 97116 GAIT TRAINING THERAPY: CPT | Mod: HCNC

## 2020-06-16 PROCEDURE — 25000003 PHARM REV CODE 250: Mod: HCNC | Performed by: INTERNAL MEDICINE

## 2020-06-16 PROCEDURE — 99232 SBSQ HOSP IP/OBS MODERATE 35: CPT | Mod: HCNC,,, | Performed by: INTERNAL MEDICINE

## 2020-06-16 PROCEDURE — 97110 THERAPEUTIC EXERCISES: CPT | Mod: HCNC

## 2020-06-16 PROCEDURE — 99232 PR SUBSEQUENT HOSPITAL CARE,LEVL II: ICD-10-PCS | Mod: HCNC,,, | Performed by: INTERNAL MEDICINE

## 2020-06-16 PROCEDURE — 99024 POSTOP FOLLOW-UP VISIT: CPT | Mod: HCNC,,, | Performed by: COLON & RECTAL SURGERY

## 2020-06-16 PROCEDURE — 84100 ASSAY OF PHOSPHORUS: CPT | Mod: HCNC

## 2020-06-16 PROCEDURE — C9113 INJ PANTOPRAZOLE SODIUM, VIA: HCPCS | Mod: HCNC | Performed by: COLON & RECTAL SURGERY

## 2020-06-16 PROCEDURE — 99024 PR POST-OP FOLLOW-UP VISIT: ICD-10-PCS | Mod: HCNC,,, | Performed by: COLON & RECTAL SURGERY

## 2020-06-16 PROCEDURE — 83735 ASSAY OF MAGNESIUM: CPT | Mod: HCNC

## 2020-06-16 PROCEDURE — 36415 COLL VENOUS BLD VENIPUNCTURE: CPT | Mod: HCNC

## 2020-06-16 RX ORDER — CLOPIDOGREL BISULFATE 75 MG/1
75 TABLET ORAL DAILY
Status: DISCONTINUED | OUTPATIENT
Start: 2020-06-16 | End: 2020-06-17 | Stop reason: HOSPADM

## 2020-06-16 RX ORDER — NAPROXEN SODIUM 220 MG/1
81 TABLET, FILM COATED ORAL DAILY
Status: DISCONTINUED | OUTPATIENT
Start: 2020-06-16 | End: 2020-06-17 | Stop reason: HOSPADM

## 2020-06-16 RX ORDER — PANTOPRAZOLE SODIUM 40 MG/1
40 TABLET, DELAYED RELEASE ORAL DAILY
Status: DISCONTINUED | OUTPATIENT
Start: 2020-06-17 | End: 2020-06-17 | Stop reason: HOSPADM

## 2020-06-16 RX ORDER — HYDROCODONE BITARTRATE AND ACETAMINOPHEN 7.5; 325 MG/1; MG/1
1 TABLET ORAL EVERY 8 HOURS PRN
Status: DISCONTINUED | OUTPATIENT
Start: 2020-06-16 | End: 2020-06-17 | Stop reason: HOSPADM

## 2020-06-16 RX ADMIN — HYDROCODONE BITARTRATE AND ACETAMINOPHEN 1 TABLET: 7.5; 325 TABLET ORAL at 08:06

## 2020-06-16 RX ADMIN — AMLODIPINE BESYLATE 5 MG: 5 TABLET ORAL at 09:06

## 2020-06-16 RX ADMIN — HYDROMORPHONE HYDROCHLORIDE 0.4 MG: 1 INJECTION, SOLUTION INTRAMUSCULAR; INTRAVENOUS; SUBCUTANEOUS at 02:06

## 2020-06-16 RX ADMIN — ISOSORBIDE MONONITRATE 120 MG: 60 TABLET, EXTENDED RELEASE ORAL at 09:06

## 2020-06-16 RX ADMIN — CARVEDILOL 25 MG: 12.5 TABLET, FILM COATED ORAL at 08:06

## 2020-06-16 RX ADMIN — DIPHENHYDRAMINE HYDROCHLORIDE 25 MG: 25 CAPSULE ORAL at 10:06

## 2020-06-16 RX ADMIN — ASPIRIN 81 MG 81 MG: 81 TABLET ORAL at 04:06

## 2020-06-16 RX ADMIN — ATORVASTATIN CALCIUM 40 MG: 40 TABLET, FILM COATED ORAL at 09:06

## 2020-06-16 RX ADMIN — CLOPIDOGREL BISULFATE 75 MG: 75 TABLET ORAL at 04:06

## 2020-06-16 RX ADMIN — DIPHENHYDRAMINE HYDROCHLORIDE 25 MG: 25 CAPSULE ORAL at 08:06

## 2020-06-16 RX ADMIN — DIPHENHYDRAMINE HYDROCHLORIDE 25 MG: 25 CAPSULE ORAL at 02:06

## 2020-06-16 RX ADMIN — HYDROMORPHONE HYDROCHLORIDE 0.4 MG: 1 INJECTION, SOLUTION INTRAMUSCULAR; INTRAVENOUS; SUBCUTANEOUS at 10:06

## 2020-06-16 RX ADMIN — PANTOPRAZOLE SODIUM 40 MG: 40 INJECTION, POWDER, LYOPHILIZED, FOR SOLUTION INTRAVENOUS at 09:06

## 2020-06-16 RX ADMIN — CARVEDILOL 25 MG: 12.5 TABLET, FILM COATED ORAL at 09:06

## 2020-06-16 RX ADMIN — DEXTROSE AND SODIUM CHLORIDE: 5; .45 INJECTION, SOLUTION INTRAVENOUS at 02:06

## 2020-06-16 NOTE — SUBJECTIVE & OBJECTIVE
Interval History: Patient is adamant in decision to not pursue any treatment. Stated he will attend a follow-up visit but states he will not change his decision on chemotherapy. PM evaluated him on 6/15/2020 see note. Negative for acute events overnight, plan  Is for discharge to rehab. Will see in follow-up after discharged from rehab. Will sign off.     Oncology Treatment Plan:   [No treatment plan]    Medications:  Continuous Infusions:   dextrose 5 % and 0.45 % NaCl 75 mL/hr at 06/16/20 0250    heparin (porcine) in D5W 13.963 Units/kg/hr (06/15/20 1911)     Scheduled Meds:   amLODIPine  5 mg Oral Daily    atorvastatin  40 mg Oral Daily    carvediloL  25 mg Oral BID    isosorbide mononitrate  120 mg Oral Daily    nozaseptin   Each Nostril BID    pantoprazole  40 mg Intravenous Daily     PRN Meds:sodium chloride, sodium chloride, sodium chloride, sodium chloride, sodium chloride, acetaminophen, albuterol-ipratropium, diphenhydrAMINE, hydrALAZINE, HYDROmorphone, naloxone, ondansetron, promethazine (PHENERGAN) IVPB     Review of Systems   Constitutional: Positive for activity change, appetite change and fatigue. Negative for chills, diaphoresis, fever and unexpected weight change.   HENT: Negative for congestion, hearing loss, nosebleeds, postnasal drip, sore throat and trouble swallowing.    Eyes: Negative for discharge and visual disturbance.   Respiratory: Negative for cough, chest tightness and shortness of breath.    Cardiovascular: Negative for chest pain, palpitations and leg swelling.   Gastrointestinal: Positive for abdominal distention and constipation. Negative for abdominal pain, diarrhea, nausea and vomiting.   Endocrine: Negative for cold intolerance and heat intolerance.   Genitourinary: Negative for difficulty urinating, dysuria, flank pain and hematuria.   Musculoskeletal: Positive for arthralgias, back pain, gait problem and myalgias.   Skin: Negative.    Neurological: Positive for  headaches. Negative for dizziness, weakness and light-headedness.   Hematological: Negative for adenopathy. Does not bruise/bleed easily.   Psychiatric/Behavioral: Positive for agitation and dysphoric mood. Negative for behavioral problems and confusion. The patient is nervous/anxious.      Objective:     Vital Signs (Most Recent):  Temp: 97.7 °F (36.5 °C) (06/16/20 1201)  Pulse: 65 (06/16/20 1201)  Resp: 18 (06/16/20 1201)  BP: (!) 143/65 (06/16/20 1201)  SpO2: 97 % (06/16/20 1201) Vital Signs (24h Range):  Temp:  [97.3 °F (36.3 °C)-98 °F (36.7 °C)] 97.7 °F (36.5 °C)  Pulse:  [65-75] 65  Resp:  [14-18] 18  SpO2:  [96 %-99 %] 97 %  BP: (143-176)/(65-91) 143/65     Weight: 75.2 kg (165 lb 12.6 oz)  Body mass index is 22.48 kg/m².  Body surface area is 1.95 meters squared.      Intake/Output Summary (Last 24 hours) at 6/16/2020 1240  Last data filed at 6/16/2020 0945  Gross per 24 hour   Intake 1387.81 ml   Output 1660 ml   Net -272.19 ml       Physical Exam  Vitals signs and nursing note reviewed.   Constitutional:       General: He is not in acute distress.     Appearance: He is well-developed and well-nourished. He is not diaphoretic.   HENT:      Head: Normocephalic and atraumatic.      Right Ear: Hearing and external ear normal.      Left Ear: Hearing and external ear normal.      Nose: Nose normal. No mucosal edema, rhinorrhea or epistaxis.      Mouth/Throat:      Mouth: Oropharynx is clear and moist and mucous membranes are normal.      Pharynx: Uvula midline.   Eyes:      General:         Right eye: No discharge.         Left eye: No discharge.      Extraocular Movements: EOM normal.      Conjunctiva/sclera: Conjunctivae normal.      Right eye: No chemosis.     Left eye: No chemosis.     Pupils: Pupils are equal, round, and reactive to light.   Neck:      Musculoskeletal: Normal range of motion and neck supple.      Thyroid: No thyroid mass or thyromegaly.      Trachea: Trachea normal.   Cardiovascular:       Rate and Rhythm: Normal rate and regular rhythm.      Pulses: Intact distal pulses.           Dorsalis pedis pulses are 2+ on the right side and 2+ on the left side.      Heart sounds: Normal heart sounds. No murmur.   Pulmonary:      Effort: Pulmonary effort is normal. No respiratory distress.      Breath sounds: Normal breath sounds. No decreased breath sounds or wheezing.   Abdominal:      General: Bowel sounds are normal. There is no distension.      Palpations: Abdomen is soft.      Tenderness: There is no abdominal tenderness.       Musculoskeletal: Normal range of motion.         General: No edema.   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   Lymphadenopathy:      Cervical: No cervical adenopathy.      Upper Body:      Right upper body: No supraclavicular adenopathy.      Left upper body: No supraclavicular adenopathy.   Skin:     General: Skin is warm and dry.      Capillary Refill: Capillary refill takes less than 2 seconds.      Findings: No rash.   Neurological:      Mental Status: He is alert and oriented to person, place, and time.   Psychiatric:         Mood and Affect: Mood is anxious.         Speech: Speech normal.         Behavior: Behavior normal.         Thought Content: Thought content normal.         Judgment: Judgment normal.         Significant Labs:   CBC:   Recent Labs   Lab 06/14/20  1522 06/15/20  0615   WBC 6.57 5.43  5.43   HGB 9.0* 9.4*  9.4*   HCT 28.9* 30.0*  30.0*    189  189    and CMP:   Recent Labs   Lab 06/15/20  0615      K 3.8      CO2 22*   *   BUN 11   CREATININE 1.5*   CALCIUM 8.6*   PROT 6.8   ALBUMIN 2.7*   BILITOT 0.4   ALKPHOS 141*   AST 15   ALT 14   ANIONGAP 7*   EGFRNONAA 46*       Diagnostic Results:  I have reviewed all pertinent imaging results/findings within the past 24 hours.

## 2020-06-16 NOTE — PROGRESS NOTES
[facility] -   Hematology/Oncology  Progress Note    Patient Name: Kodi Ribeiro  Admission Date: 6/8/2020  Hospital Length of Stay: 7 days  Code Status: Full Code     Subjective:     HPI:  70-year-old male with past medical history significant for chronic kidney disease, CHF, CVA, coronary artery disease, hypertension, hyperlipidemia, PVD with previous AAA repair who presented with complaints of hematochezia and melena since January intermittently.  He  recently underwent colonoscopy 06/02/2020 that showed malignant-appearing mass in the transverse colon and possibly descending colon.  The above masses were biopsied and confirmed adenocarcinoma.  Who CT abdomen and pelvis showed stable reticulonodular opacity within the right lower lobe otherwise stable abdomen and pelvis.  Oncology was consulted given the new diagnosis of colonic adenocarcinoma.    Interval History: Patient is adamant in decision to not pursue any treatment. Stated he will attend a follow-up visit but states he will not change his decision on chemotherapy. PM evaluated him on 6/15/2020 see note. Negative for acute events overnight, plan  Is for discharge to rehab. Will see in follow-up after discharged from rehab. Will sign off.     Oncology Treatment Plan:   [No treatment plan]    Medications:  Continuous Infusions:   dextrose 5 % and 0.45 % NaCl 75 mL/hr at 06/16/20 0250    heparin (porcine) in D5W 13.963 Units/kg/hr (06/15/20 1911)     Scheduled Meds:   amLODIPine  5 mg Oral Daily    atorvastatin  40 mg Oral Daily    carvediloL  25 mg Oral BID    isosorbide mononitrate  120 mg Oral Daily    nozaseptin   Each Nostril BID    pantoprazole  40 mg Intravenous Daily     PRN Meds:sodium chloride, sodium chloride, sodium chloride, sodium chloride, sodium chloride, acetaminophen, albuterol-ipratropium, diphenhydrAMINE, hydrALAZINE, HYDROmorphone, naloxone, ondansetron, promethazine (PHENERGAN) IVPB     Review of Systems    Constitutional: Positive for activity change, appetite change and fatigue. Negative for chills, diaphoresis, fever and unexpected weight change.   HENT: Negative for congestion, hearing loss, nosebleeds, postnasal drip, sore throat and trouble swallowing.    Eyes: Negative for discharge and visual disturbance.   Respiratory: Negative for cough, chest tightness and shortness of breath.    Cardiovascular: Negative for chest pain, palpitations and leg swelling.   Gastrointestinal: Positive for abdominal distention and constipation. Negative for abdominal pain, diarrhea, nausea and vomiting.   Endocrine: Negative for cold intolerance and heat intolerance.   Genitourinary: Negative for difficulty urinating, dysuria, flank pain and hematuria.   Musculoskeletal: Positive for arthralgias, back pain, gait problem and myalgias.   Skin: Negative.    Neurological: Positive for headaches. Negative for dizziness, weakness and light-headedness.   Hematological: Negative for adenopathy. Does not bruise/bleed easily.   Psychiatric/Behavioral: Positive for agitation and dysphoric mood. Negative for behavioral problems and confusion. The patient is nervous/anxious.      Objective:     Vital Signs (Most Recent):  Temp: 97.7 °F (36.5 °C) (06/16/20 1201)  Pulse: 65 (06/16/20 1201)  Resp: 18 (06/16/20 1201)  BP: (!) 143/65 (06/16/20 1201)  SpO2: 97 % (06/16/20 1201) Vital Signs (24h Range):  Temp:  [97.3 °F (36.3 °C)-98 °F (36.7 °C)] 97.7 °F (36.5 °C)  Pulse:  [65-75] 65  Resp:  [14-18] 18  SpO2:  [96 %-99 %] 97 %  BP: (143-176)/(65-91) 143/65     Weight: 75.2 kg (165 lb 12.6 oz)  Body mass index is 22.48 kg/m².  Body surface area is 1.95 meters squared.      Intake/Output Summary (Last 24 hours) at 6/16/2020 1240  Last data filed at 6/16/2020 0945  Gross per 24 hour   Intake 1387.81 ml   Output 1660 ml   Net -272.19 ml       Physical Exam  Vitals signs and nursing note reviewed.   Constitutional:       General: He is not in acute  distress.     Appearance: He is well-developed and well-nourished. He is not diaphoretic.   HENT:      Head: Normocephalic and atraumatic.      Right Ear: Hearing and external ear normal.      Left Ear: Hearing and external ear normal.      Nose: Nose normal. No mucosal edema, rhinorrhea or epistaxis.      Mouth/Throat:      Mouth: Oropharynx is clear and moist and mucous membranes are normal.      Pharynx: Uvula midline.   Eyes:      General:         Right eye: No discharge.         Left eye: No discharge.      Extraocular Movements: EOM normal.      Conjunctiva/sclera: Conjunctivae normal.      Right eye: No chemosis.     Left eye: No chemosis.     Pupils: Pupils are equal, round, and reactive to light.   Neck:      Musculoskeletal: Normal range of motion and neck supple.      Thyroid: No thyroid mass or thyromegaly.      Trachea: Trachea normal.   Cardiovascular:      Rate and Rhythm: Normal rate and regular rhythm.      Pulses: Intact distal pulses.           Dorsalis pedis pulses are 2+ on the right side and 2+ on the left side.      Heart sounds: Normal heart sounds. No murmur.   Pulmonary:      Effort: Pulmonary effort is normal. No respiratory distress.      Breath sounds: Normal breath sounds. No decreased breath sounds or wheezing.   Abdominal:      General: Bowel sounds are normal. There is no distension.      Palpations: Abdomen is soft.      Tenderness: There is no abdominal tenderness.       Musculoskeletal: Normal range of motion.         General: No edema.   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   Lymphadenopathy:      Cervical: No cervical adenopathy.      Upper Body:      Right upper body: No supraclavicular adenopathy.      Left upper body: No supraclavicular adenopathy.   Skin:     General: Skin is warm and dry.      Capillary Refill: Capillary refill takes less than 2 seconds.      Findings: No rash.   Neurological:      Mental Status: He is alert and  oriented to person, place, and time.   Psychiatric:         Mood and Affect: Mood is anxious.         Speech: Speech normal.         Behavior: Behavior normal.         Thought Content: Thought content normal.         Judgment: Judgment normal.         Significant Labs:   CBC:   Recent Labs   Lab 06/14/20  1522 06/15/20  0615   WBC 6.57 5.43  5.43   HGB 9.0* 9.4*  9.4*   HCT 28.9* 30.0*  30.0*    189  189    and CMP:   Recent Labs   Lab 06/15/20  0615      K 3.8      CO2 22*   *   BUN 11   CREATININE 1.5*   CALCIUM 8.6*   PROT 6.8   ALBUMIN 2.7*   BILITOT 0.4   ALKPHOS 141*   AST 15   ALT 14   ANIONGAP 7*   EGFRNONAA 46*       Diagnostic Results:  I have reviewed all pertinent imaging results/findings within the past 24 hours.    Assessment/Plan:     * Colon adenocarcinoma  Status post open partial transverse colectomy with end proximal transverse colostomy on 06/11/2020.  -postsurgical management per surgery service.  -discussed with patient the natural history and prognosis of metastatic colonic adenocarcinoma.  Patient remains adamant about refusal of chemotherapy.  -recommend outpatient follow-up for further discussion.  --PM has seen patient- See Note    Acute blood loss anemia  Admitted with c/o GI bleeding secondary to Colon cancer. S/P colon resection    --Daily CBC, CMP  --Transfuse for Hgb <7.0        Thank you for your consult. I will sign off. Please contact us if you have any additional questions.     Breana Carreon NP  Hematology/Oncology  [facility] [place]

## 2020-06-16 NOTE — NURSING
Pt is on heparin drip  At 1840, PTT was 43.3 and falls within therapeutic range and does not require 2nd therapeutic lab draw before switching to am lab draw according to nomogram on pt MAR.   Discussed with Aurelio, pharmacist and charge nurse aware.

## 2020-06-16 NOTE — ASSESSMENT & PLAN NOTE
69yo M with multiple medical comorbidities who presents with transverse vs descending colonic adenocarcinoma with possible metastatic lung disease with acute lower GI bleed, recurrent who is now s/p open partial transverse colectomy with end proximal transverse colostomy on 6/11/2020    - Advance to reg diet today  - minimize IVF  - cont ostomy nurse evaluation/education  - okay to resume oral antiplatelet therapy from surgical perspective and DC hep gtt  - OOB encouraged  - PPx: ppi

## 2020-06-16 NOTE — PROGRESS NOTES
[facility] [place]  Colorectal Surgery  Progress Note    Subjective:     History of Present Illness:  70 y.o. male well known to me who is admitted to the hospital with a LGIB. Pt has known colonic adenocarcinoma which is likely his source of bleeding, as he had colonoscopy last week showing other polyps as well as known carcinoma. Pt has a PMHx significant for anemia, GERD, HTN, HLD, CKD, CHF, CVA, CAD and PVD with previous AAA repair who recently underwent a colonoscopy on 06/02/2020 where a malignant-appearing mass was seen in the transverse colon, possible descending colon and biopsied.  Biopsies did confirm this to be adenocarcinoma.  Patient states that he had been having both hematochezia and melena since January intermittently. Patient's last colonoscopy prior to this 1 was in February 2014.  Patient has significant past surgical history including multiple previous abdominal operations including AAA repair, AAA graft excision and right ax fem bypass with small-bowel resection.  He denies current fever, chills, nausea, vomiting.  Patient reports a significant past coronary history requiring stent placement and likely need for further stent/CABG but is unable to undergo at this time due to high risk nature and has chosen medical therapy. Surgery consulted for evaluation of colonic adenoCA with bleeding.    Post-Op Info:  Procedure(s) (LRB):  COLECTOMY, PARTIAL (N/A)  BLOCK, TRANSVERSUS ABDOMINIS PLANE (N/A)  LYSIS, ADHESIONS (N/A)  CREATION, COLOSTOMY (N/A)  OMENTECTOMY (N/A)   5 Days Post-Op     Interval History: No acute events overnight. Increased stoma output. Pain well controlled. Tolerating clears without nausea or vomiting.     Medications:  Continuous Infusions:   dextrose 5 % and 0.45 % NaCl 75 mL/hr at 06/16/20 0250    heparin (porcine) in D5W 13.963 Units/kg/hr (06/15/20 1911)     Scheduled Meds:   amLODIPine  5 mg Oral Daily    atorvastatin  40 mg Oral Daily    carvediloL  25 mg Oral  BID    isosorbide mononitrate  120 mg Oral Daily    nozaseptin   Each Nostril BID    [START ON 6/17/2020] pantoprazole  40 mg Oral Daily     PRN Meds:sodium chloride, acetaminophen, albuterol-ipratropium, diphenhydrAMINE, hydrALAZINE, HYDROmorphone, naloxone, ondansetron, promethazine (PHENERGAN) IVPB     Review of patient's allergies indicates:   Allergen Reactions    Morphine Itching     Objective:     Vital Signs (Most Recent):  Temp: 97.7 °F (36.5 °C) (06/16/20 1201)  Pulse: 65 (06/16/20 1201)  Resp: 18 (06/16/20 1201)  BP: (!) 143/65 (06/16/20 1201)  SpO2: 97 % (06/16/20 1201) Vital Signs (24h Range):  Temp:  [97.3 °F (36.3 °C)-98 °F (36.7 °C)] 97.7 °F (36.5 °C)  Pulse:  [65-75] 65  Resp:  [14-18] 18  SpO2:  [96 %-99 %] 97 %  BP: (143-176)/(65-91) 143/65     Weight: 75.2 kg (165 lb 12.6 oz)  Body mass index is 22.48 kg/m².    Intake/Output - Last 3 Shifts       06/14 0700 - 06/15 0659 06/15 0700 - 06/16 0659 06/16 0700 - 06/17 0659    P.O. 30 780 460    I.V. (mL/kg) 1834.6 (24.4) 1147.8 (15.3)     Total Intake(mL/kg) 1864.6 (24.8) 1927.8 (25.6) 460 (6.1)    Urine (mL/kg/hr) 1500 (0.8) 1210 (0.7) 850 (1.4)    Drains 0      Stool 10 0     Total Output 1510 1210 850    Net +354.6 +717.8 -390           Urine Occurrence  1 x     Stool Occurrence 0 x 0 x           Physical Exam  Constitutional:       Appearance: He is well-developed.   HENT:      Head: Normocephalic and atraumatic.   Eyes:      Extraocular Movements: EOM normal.      Conjunctiva/sclera: Conjunctivae normal.   Neck:      Musculoskeletal: Normal range of motion.      Thyroid: No thyromegaly.   Cardiovascular:      Rate and Rhythm: Normal rate and regular rhythm.   Pulmonary:      Effort: Pulmonary effort is normal. No respiratory distress.   Abdominal:      Comments: Soft, nondistended, appropriately TTP; incision c/d/i without erythema or drainage; RUQ ostomy pink and moist with pasty stool and gas in bag   Musculoskeletal:         General: No  tenderness or edema.      Comments: R foot s/p amputation   Skin:     General: Skin is warm and dry.      Capillary Refill: Capillary refill takes less than 2 seconds.      Findings: No rash.   Neurological:      Mental Status: He is alert and oriented to person, place, and time.   Psychiatric:         Mood and Affect: Mood and affect normal.         Significant Labs:  CBC:   Recent Labs   Lab 06/15/20  0615   WBC 5.43  5.43   RBC 3.45*  3.45*   HGB 9.4*  9.4*   HCT 30.0*  30.0*     189   MCV 87  87   MCH 27.2  27.2   MCHC 31.3*  31.3*     BMP:   Recent Labs   Lab 06/15/20  0615 06/16/20  0530   *  --      --    K 3.8  --      --    CO2 22*  --    BUN 11  --    CREATININE 1.5*  --    CALCIUM 8.6*  --    MG 2.0 1.8     Assessment/Plan:     * Colon adenocarcinoma  69yo M with multiple medical comorbidities who presents with transverse vs descending colonic adenocarcinoma with possible metastatic lung disease with acute lower GI bleed, recurrent who is now s/p open partial transverse colectomy with end proximal transverse colostomy on 6/11/2020    - Advance to reg diet today  - minimize IVF  - cont ostomy nurse evaluation/education  - okay to resume oral antiplatelet therapy from surgical perspective and DC hep gtt  - OOB encouraged  - PPx: ppi    COPD (chronic obstructive pulmonary disease)  Management per primary team    Coronary artery disease involving native coronary artery of native heart without angina pectoris  Management per primary team    Acute on chronic kidney failure  Management per primary team    PVD (peripheral vascular disease)  Management per primary team    Acute blood loss anemia  Management per primary team    Essential hypertension  Management per primary team    Hyperlipidemia  Management per primary team        Leonardo Yang MD  Colorectal Surgery  [facility]

## 2020-06-16 NOTE — ASSESSMENT & PLAN NOTE
Status post open partial transverse colectomy with end proximal transverse colostomy on 06/11/2020.  -postsurgical management per surgery service.  -discussed with patient the natural history and prognosis of metastatic colonic adenocarcinoma.  Patient remains adamant about refusal of chemotherapy.  -recommend outpatient follow-up for further discussion.  --PM has seen patient- See Note

## 2020-06-16 NOTE — PROGRESS NOTES
[facility] Hospital Medicine  Progress Note     Patient Name: Kodi Ribeiro  MRN: [id number]  Patient Class: IP- Inpatient     Admission Date: 6/8/2020  Length of Stay: 6 days  Attending Physician: Mimi Marshall MD  Primary Care Provider: Norma Nuñez MD     Subjective:      Principal Problem:Colon adenocarcinoma           HPI:  Kodi Ribeiro is a 70 y.o. male patient with a PMHx of CVA, CHF, COPD, CAD, diverticulosis, GERD, PVD, hyperglycemia, HLD, HTN, Anemia, and MI who presents to the Emergency Department for evaluation of rectal bleeding which onset gradually earlier today. Pt reports having had a bloody stool this morning. Pt states that he was on Plavix, but was stopped by his PCP 10 days ago when he had similar sxs but has continued to take ASA. Pt had a hx of hematochezia since 01/2020 and had colonoscopy last Tuesday with colon cancer diagnosis. Pt seen by Dr. Wilkerson (General Surgery) as outpatient yesterday with possible options for surgical intervention discussed.  Symptoms are intermittent and moderate in severity. No mitigating or exacerbating factors reported. Associated sxs include constipation, hematochezia, abdominal pain when making a BM, diaphoresis, chills, and syncope.  Patient denies any CP, hematuria, N/V, SOB, light-headedness, and all other sxs at this time. No prior Tx reported. No further complaints or concerns at this time.  Pt denies smoking and use of ETOH.  Pt is a full code and Laury STEWARD'Connnor (wife) at [phone] is the surrogate decision maker.  ER work up showed: H/H 7.5/24.4, CO2 16, Ca 8.2, BUN/Creatinine 44/3.2, Glucose 159, and Albumin 3.3.  CT abdomen/pelvis performed and CT of chest/abdomen/pelvis results on 6/5/2020 reviewed.  ER discussed case with GI and General Surgery.  Hospital Medicine contacted for admission with patient placed in Observation for further evaluation.       Overview/Hospital Course:  6/10/20: No events; Last BM was  yesterday AM (BRBPR), no BM since, which he attributes to being NPO, but today was on clears and is NPO after MN for scheduled general surgery for possible resection of adenocarcinoma in AM. No cp or sob.  - Ordered 2 more units RBCs (completed a total of 3U yesterday).   6/11/20: s/p partial bowel resection with colostomy; tolerated surgery well without obvious intra-operative complications  - Pain control  - Continue to monitor closely  6/12/20: POD#2; no events o/n; sBP's 153-182 range, which is likely 2/2 pain, which he describes as abn tenderness w/ movement  6/13/20-doing well. NGT clamped. Passing some gas.   As of 6/14/2020 NGT removed, but will remain NPO except some ice chips. Will place order for PT/OT to encourage mobility. Creatinine continues to trend down. No gas noted from colostomy.   6/15/20  Diet advanced to clear liquid diet. Tolerating heparin infusion without bleeding complication. Ambulated down hallway with PT/OT.      Interval History: doing well with clear liquid diet.     Review of Systems   Constitutional: Negative for fever.   HENT: Negative for hearing loss.         NGT clamped     Eyes: Negative for visual disturbance.   Respiratory: Negative for cough and shortness of breath.    Cardiovascular: Negative for chest pain and palpitations.   Gastrointestinal: Positive for abdominal pain (tenderness when moving).        Painful as expected following partial bowel resection   Genitourinary: Negative for difficulty urinating, dysuria, frequency and hematuria.   Musculoskeletal: Negative for arthralgias and back pain.   Skin: Negative for color change and rash.   Neurological: Positive for weakness. Negative for seizures, syncope, light-headedness, numbness and headaches.   Hematological: Bruises/bleeds easily.   Psychiatric/Behavioral: The patient is not nervous/anxious.       Objective:      Vital Signs (Most Recent):  Temp: 97.8 °F (36.6 °C) (06/15/20 1620)  Pulse: 69 (06/15/20  1620)  Resp: 16 (06/15/20 1620)  BP: (!) 157/79 (06/15/20 1620)  SpO2: 97 % (06/15/20 1620) Vital Signs (24h Range):  Temp:  [97.5 °F (36.4 °C)-98.5 °F (36.9 °C)] 97.8 °F (36.6 °C)  Pulse:  [63-78] 69  Resp:  [16-18] 16  SpO2:  [95 %-98 %] 97 %  BP: (146-180)/(72-82) 157/79      Weight: 75.2 kg (165 lb 12.6 oz)  Body mass index is 22.48 kg/m².     Intake/Output Summary (Last 24 hours) at 6/15/2020 1701  Last data filed at 6/15/2020 1238  Gross per 24 hour   Intake 2404.63 ml   Output 1450 ml   Net 954.63 ml      Physical Exam  Constitutional:       Appearance: He is well-developed.   HENT:      Head: Normocephalic and atraumatic.   Eyes:      Extraocular Movements: EOM normal.      Conjunctiva/sclera: Conjunctivae normal.   Neck:      Musculoskeletal: Normal range of motion.      Thyroid: No thyromegaly.   Cardiovascular:      Rate and Rhythm: Normal rate and regular rhythm.   Pulmonary:      Effort: Pulmonary effort is normal. No respiratory distress.   Abdominal:      Comments: Soft, nondistended, appropriately TTP; incision c/d/i without erythema or drainage; RUQ ostomy pink and moist with minor amount of stool and gas in bag   Musculoskeletal:         General: No tenderness or edema.      Comments: S/p right BKA   Skin:     General: Skin is warm and dry.      Capillary Refill: Capillary refill takes less than 2 seconds.      Findings: No rash.   Neurological:      Mental Status: He is alert and oriented to person, place, and time.   Psychiatric:         Mood and Affect: Mood and affect normal.         Significant Labs:   CBC:         Recent Labs   Lab 06/14/20  0524 06/14/20  1522 06/15/20  0615   WBC 6.97 6.57 5.43  5.43   HGB 9.4* 9.0* 9.4*  9.4*   HCT 31.5* 28.9* 30.0*  30.0*    173 189  189      CMP:        Recent Labs   Lab 06/14/20  0524 06/15/20  0615    139   K 4.1 3.8    110   CO2 21* 22*   * 111*   BUN 12 11   CREATININE 1.6* 1.5*   CALCIUM 8.4* 8.6*   PROT 6.5 6.8    ALBUMIN 2.7* 2.7*   BILITOT 0.5 0.4   ALKPHOS 133 141*   AST 18 15   ALT 14 14   ANIONGAP 9 7*   EGFRNONAA 43* 46*      Significant Imaging: I have reviewed all pertinent imaging results/findings within the past 24 hours.       Assessment/Plan:      * Colon adenocarcinoma  -pt seen outpatient by Dr. Yang on yesterday   -recent colonoscopy per GI on last Thursday   -options for possible surgical intervention   -Cardiology consulted   -EKG, Echo, and chest xray pending   -antiemetics as needed   -analgesia as needed      6/13/2020  Oncology input appreciated.   Patient is not interested in chemotherapy  General surgery managing postoperatively.     6/14/20  General surgery managing.   NGT has been removed.     6/16/2020  Diet advanced to regular today. Anticipate discharge in AM.     Pulmonary nodules  -imaging on 6/5/2020 supports 3 noncalcified pulmonary nodules, measuring between 6 and 8 mm in the middle lobe and left upper lobe.  Due to history early metastasis must be considered.  -pt to benefit from outpatient follow up and further evaluation      COPD (chronic obstructive pulmonary disease)  -Duonebs as needed   -supplemental oxygen as needed         Acute lower GI bleeding  -in the setting of adenocarcinoma   -recent colonoscopy with clips within the cecum, with wall thickening present corresponding to the colon neoplasm  -Plavix held 10 days ago by PCP   -symptoms present intermittently since 01/2020  -ASA held   -PPI   -case discussed with GI with symptoms likely due to colon adenocarcinoma and no further recommendations   -General Surgery on consult   -serial H/H in progress   -downward trend noted with PRBCs transfused x 1 unit in ED and additional unit in progress  6/14/20  Stable. Will plan to start heparin sub q if okay with Gigila. Continue to hold ASA and Plavix   6/16/20  No episodes of bleeding while on heparin infusion  Will resume ASA and plavix     Coronary artery disease involving native  coronary artery of native heart without angina pectoris  -ASA and Plavix held due to bleeding   -will resume Coreg when appropriate   -Statin and Imdur continued   -s/p stents x 3 in 2000 and MI      Acute on chronic kidney failure  BUN/Creatinine 44/3.2   -in the setting of symptomatic anemia   -baseline 1.4-2.5  -blood transfusion in progress   -repeat CMP in am   6/10/20: Slightly improved sCr from yesterday (3.2 -> 3.0), which may slightly improve further with more blood  6/11/20: sCr further improved from 3.0>2.1 (eGFR 20>31)  6/14/20 continues to improve  6/16/20 improving     PVD (peripheral vascular disease)  -hx of   -s/p fem-fem bypass graft and left SFA   -pt was followed outpatient by Dr. Jamil ([facility])  -ASA and Plavix on hold due to bleeding         Acute blood loss anemia  -in the setting of lower GI Bleed   -ASA and Plavix on hold   -H/H 7.5/24.4   -PRBCs transfused x 1 unit with current unit in progress   -serial H/H's in progress-will continue to transfuse as needed   -supplemental oxygen as needed   H&H stable over the past 1 day; no gross BRBPR since yesterday AM; s/p 3U PRBCs  6/11/20: Hgb 8.9  -monitor closely and tfx prn  6/15/20  stable     Essential hypertension  -stable   -will hold antihypertensives due to active bleed and resume as appropriate   6/10/20: Restarted a lower dose BB per card recs given BP and HR being able to tolerate given plan for surgery tomorrow  6/11/20: Higher BP's today likely partially due to pain  - Hydralazine 10 mg IV q8H prn added  - Carvedilol dose increased from 12.5 mg po bid to 25 mg po bid  6/12/20: sBPs 153-182, which is likely 2/2 POD#2 pain despite PCA pump  -Amlodipine 5 mg po qd added   6/14/20  Still slightly elevated; expected to decrease once fluids are discontinued.  6/16/20  Stable     Hyperlipidemia  -Statin             VTE Risk Mitigation (From admission, onward)                  Ordered        heparin 25,000 units in dextrose 5% 250 ml  (100 units/mL) infusion MINIMAL INTENSITY nomogram - [facility]  Continuous     Question:  Heparin Infusion Adjustment (DO NOT MODIFY ANSWER)  Answer:  \\[email or web address]\epic\Images\Pharmacy\HeparinInfusions\heparin MINIMAL  INTENSITY nomogram for [facility] ET961V.pdf    06/14/20 1516                    Lizy Rodriguez NP  Department of Hospital Medicine   [facility]

## 2020-06-16 NOTE — SUBJECTIVE & OBJECTIVE
Interval History: No acute events overnight. Increased stoma output. Pain well controlled. Tolerating clears without nausea or vomiting.     Medications:  Continuous Infusions:   dextrose 5 % and 0.45 % NaCl 75 mL/hr at 06/16/20 0250    heparin (porcine) in D5W 13.963 Units/kg/hr (06/15/20 1911)     Scheduled Meds:   amLODIPine  5 mg Oral Daily    atorvastatin  40 mg Oral Daily    carvediloL  25 mg Oral BID    isosorbide mononitrate  120 mg Oral Daily    nozaseptin   Each Nostril BID    [START ON 6/17/2020] pantoprazole  40 mg Oral Daily     PRN Meds:sodium chloride, acetaminophen, albuterol-ipratropium, diphenhydrAMINE, hydrALAZINE, HYDROmorphone, naloxone, ondansetron, promethazine (PHENERGAN) IVPB     Review of patient's allergies indicates:   Allergen Reactions    Morphine Itching     Objective:     Vital Signs (Most Recent):  Temp: 97.7 °F (36.5 °C) (06/16/20 1201)  Pulse: 65 (06/16/20 1201)  Resp: 18 (06/16/20 1201)  BP: (!) 143/65 (06/16/20 1201)  SpO2: 97 % (06/16/20 1201) Vital Signs (24h Range):  Temp:  [97.3 °F (36.3 °C)-98 °F (36.7 °C)] 97.7 °F (36.5 °C)  Pulse:  [65-75] 65  Resp:  [14-18] 18  SpO2:  [96 %-99 %] 97 %  BP: (143-176)/(65-91) 143/65     Weight: 75.2 kg (165 lb 12.6 oz)  Body mass index is 22.48 kg/m².    Intake/Output - Last 3 Shifts       06/14 0700 - 06/15 0659 06/15 0700 - 06/16 0659 06/16 0700 - 06/17 0659    P.O. 30 780 460    I.V. (mL/kg) 1834.6 (24.4) 1147.8 (15.3)     Total Intake(mL/kg) 1864.6 (24.8) 1927.8 (25.6) 460 (6.1)    Urine (mL/kg/hr) 1500 (0.8) 1210 (0.7) 850 (1.4)    Drains 0      Stool 10 0     Total Output 1510 1210 850    Net +354.6 +717.8 -390           Urine Occurrence  1 x     Stool Occurrence 0 x 0 x           Physical Exam  Constitutional:       Appearance: He is well-developed.   HENT:      Head: Normocephalic and atraumatic.   Eyes:      Extraocular Movements: EOM normal.      Conjunctiva/sclera: Conjunctivae normal.   Neck:      Musculoskeletal:  Normal range of motion.      Thyroid: No thyromegaly.   Cardiovascular:      Rate and Rhythm: Normal rate and regular rhythm.   Pulmonary:      Effort: Pulmonary effort is normal. No respiratory distress.   Abdominal:      Comments: Soft, nondistended, appropriately TTP; incision c/d/i without erythema or drainage; RUQ ostomy pink and moist with pasty stool and gas in bag   Musculoskeletal:         General: No tenderness or edema.      Comments: R foot s/p amputation   Skin:     General: Skin is warm and dry.      Capillary Refill: Capillary refill takes less than 2 seconds.      Findings: No rash.   Neurological:      Mental Status: He is alert and oriented to person, place, and time.   Psychiatric:         Mood and Affect: Mood and affect normal.         Significant Labs:  CBC:   Recent Labs   Lab 06/15/20  0615   WBC 5.43  5.43   RBC 3.45*  3.45*   HGB 9.4*  9.4*   HCT 30.0*  30.0*     189   MCV 87  87   MCH 27.2  27.2   MCHC 31.3*  31.3*     BMP:   Recent Labs   Lab 06/15/20  0615 06/16/20  0530   *  --      --    K 3.8  --      --    CO2 22*  --    BUN 11  --    CREATININE 1.5*  --    CALCIUM 8.6*  --    MG 2.0 1.8

## 2020-06-16 NOTE — PT/OT/SLP PROGRESS
Physical Therapy  Treatment    Kodi Ribeiro   MRN: [id number]   Admitting Diagnosis: Colon adenocarcinoma    PT Received On: 06/16/20  PT Start Time: 1035     PT Stop Time: 1100    PT Total Time (min): 25 min       Billable Minutes:  Gait Training 15 and Therapeutic Exercise 10    Treatment Type: Treatment  PT/PTA: PT     PTA Visit Number: 0       General Precautions: Standard, fall  Orthopedic Precautions: N/A   Braces: N/A         Subjective:  Communicated with NURSE BOWLING AND Epic CHART REVIEW  prior to session.   PT AGREED TO TX    Pain/Comfort  Pain Rating 1: 0/10  Pain Rating Post-Intervention 1: 0/10    Objective:   Patient found with: telemetry, peripheral IV    Functional Mobility:  PT MET IN RM SUP.SIT EOB WITH S. PT DONNED AFO AND SHOE AND PROSTHETIC. PT STOOD WITH RW AND SBA. PT GT TRAINED X 100' WITH RW AND SBA. PT RETURNED TO RM T/F TO CHAIR WITH RW AND SBA. PT COMPLETED B LE TE X 15 REPS OF TKE MIP AND GLUT SETS. PT LEFT SEATED IN CHAIR WITH ALL NEEDS  MET AND CALL BELL IN REACH.     AM-PAC 6 CLICK MOBILITY  How much help from another person does this patient currently need?   1 = Unable, Total/Dependent Assistance  2 = A lot, Maximum/Moderate Assistance  3 = A little, Minimum/Contact Guard/Supervision  4 = None, Modified Cavalier/Independent    Turning over in bed (including adjusting bedclothes, sheets and blankets)?: 4  Sitting down on and standing up from a chair with arms (e.g., wheelchair, bedside commode, etc.): 4  Moving from lying on back to sitting on the side of the bed?: 4  Moving to and from a bed to a chair (including a wheelchair)?: 4  Need to walk in hospital room?: 4  Climbing 3-5 steps with a railing?: 1  Basic Mobility Total Score: 21    AM-PAC Raw Score CMS G-Code Modifier Level of Impairment Assistance   6 % Total / Unable   7 - 9 CM 80 - 100% Maximal Assist   10 - 14 CL 60 - 80% Moderate Assist   15 - 19 CK 40 - 60% Moderate Assist   20 - 22 CJ 20 - 40% Minimal  Assist   23 CI 1-20% SBA / CGA   24 CH 0% Independent/ Mod I     Patient left up in chair with all lines intact, call button in reach and chair alarm on.    Assessment:  PT PROGRESSING WITH GDAMARIS     Rehab identified problem list/impairments: Rehab identified problem list/impairments: weakness, impaired endurance, impaired functional mobilty, gait instability, impaired balance, decreased lower extremity function    Rehab potential is good.    Activity tolerance: Good    Discharge recommendations: Discharge Facility/Level of Care Needs: home health PT     Barriers to discharge:      Equipment recommendations: Equipment Needed After Discharge: none     GOALS:   Multidisciplinary Problems     Physical Therapy Goals        Problem: Physical Therapy Goal    Goal Priority Disciplines Outcome Goal Variances Interventions   Physical Therapy Goal     PT, PT/OT Ongoing, Progressing     Description: LTGs to be met within 7 days (6/22/20):  1.  Patient will perform bed mobility with modified independence  2.  Patient will perform functional transfers with appropriate AD with SBA  3.  Patient will ambulate 200 feet with appropriate AD with CGA and no gross LOB                   PLAN:    Patient to be seen 5 x/week  to address the above listed problems via gait training, therapeutic activities, therapeutic exercises  Plan of Care expires: 06/22/20  Plan of Care reviewed with: patient, spouse         Lauren Doss, PT  06/16/2020

## 2020-06-16 NOTE — PROGRESS NOTES
Pharmacist Intervention IV to PO Note    Kodi Ribeiro is a 70 y.o. male being treated with IV medication pantoprazole    Patient Data:    Vital Signs (Most Recent):  Temp: 97.7 °F (36.5 °C) (06/16/20 1201)  Pulse: 65 (06/16/20 1201)  Resp: 18 (06/16/20 1201)  BP: (!) 143/65 (06/16/20 1201)  SpO2: 97 % (06/16/20 1201)   Vital Signs (72h Range):  Temp:  [97.3 °F (36.3 °C)-98.7 °F (37.1 °C)]   Pulse:  [63-86]   Resp:  [14-18]   BP: (143-180)/(65-91)   SpO2:  [92 %-99 %]      CBC:  Recent Labs   Lab 06/14/20  0524 06/14/20  1522 06/15/20  0615   WBC 6.97 6.57 5.43  5.43   RBC 3.48* 3.34* 3.45*  3.45*   HGB 9.4* 9.0* 9.4*  9.4*   HCT 31.5* 28.9* 30.0*  30.0*    173 189  189   MCV 91 87 87  87   MCH 27.0 26.9* 27.2  27.2   MCHC 29.8* 31.1* 31.3*  31.3*     CMP:     Recent Labs   Lab 06/12/20  0528 06/14/20  0524 06/15/20  0615   GLU 98 112* 111*   CALCIUM 8.1* 8.4* 8.6*   ALBUMIN 3.0* 2.7* 2.7*   PROT 6.4 6.5 6.8    138 139   K 4.0 4.1 3.8   CO2 23 21* 22*    108 110   BUN 18 12 11   CREATININE 1.8* 1.6* 1.5*   ALKPHOS 129 133 141*   ALT 13 14 14   AST 14 18 15   BILITOT 0.6 0.5 0.4       Dietary Orders:  Diet Orders            Diet clear liquid: Clear Liquid starting at 06/15 0819            Based on the following criteria, this patient qualifies for intravenous to oral conversion:  [x] The patients gastrointestinal tract is functioning (tolerating medications via oral or enteral route for 24 hours and tolerating food or enteral feeds for 24 hours).  [x] The patient is hemodynamically stable for 24 hours (heart rate <100 beats per minute, systolic blood pressure >99 mm Hg, and respiratory rate <20 breaths per minute).  [x] The patient shows clinical improvement (afebrile for at least 24 hours and white blood cell count downtrending or normalized). Additionally, the patient must be non-neutropenic (absolute neutrophil count >500 cells/mm3).  [x] For antimicrobials, the patient has received  IV therapy for at least 24 hours.    IV medication pantoprazole IV 40 mg QD will be changed to oral medication pantoprazole 40 mg PO QD.     Pharmacist's Name: Becky Sevilla  Pharmacist's Extension: [phone]

## 2020-06-16 NOTE — PLAN OF CARE
Plan of care reviewed with pt, pt verbalized understanding. IV site clean, dry, intact, no redness or swelling noted. Heparin drip infusing, therapeutic range achieved, nomogram instructions followed. Midline incision noted, staples intact, colostomy intact, no output this shift. Wound to left heel noted, cleansed and foam dressing applied this shift. Pt remains free of fall/trauma. Pain is controlled with current regimen. VSS. Purposeful q2h rounding done, safety maintained, call light in reach, bed locked and in lowest position, side rails up x2. Will continue to monitor, observe and report any changes.

## 2020-06-17 ENCOUNTER — TELEPHONE (OUTPATIENT)
Dept: SURGERY | Facility: CLINIC | Age: 70
End: 2020-06-17

## 2020-06-17 VITALS
TEMPERATURE: 98 F | HEART RATE: 69 BPM | RESPIRATION RATE: 18 BRPM | HEIGHT: 72 IN | DIASTOLIC BLOOD PRESSURE: 60 MMHG | SYSTOLIC BLOOD PRESSURE: 125 MMHG | OXYGEN SATURATION: 97 % | WEIGHT: 165.81 LBS | BODY MASS INDEX: 22.46 KG/M2

## 2020-06-17 LAB
ALBUMIN SERPL BCP-MCNC: 2.7 G/DL (ref 3.5–5.2)
ALP SERPL-CCNC: 148 U/L (ref 55–135)
ALT SERPL W/O P-5'-P-CCNC: 11 U/L (ref 10–44)
ANION GAP SERPL CALC-SCNC: 6 MMOL/L (ref 8–16)
APTT BLDCRRT: 31.4 SEC (ref 21–32)
AST SERPL-CCNC: 12 U/L (ref 10–40)
BASOPHILS # BLD AUTO: 0.02 K/UL (ref 0–0.2)
BASOPHILS NFR BLD: 0.4 % (ref 0–1.9)
BILIRUB SERPL-MCNC: 0.4 MG/DL (ref 0.1–1)
BUN SERPL-MCNC: 10 MG/DL (ref 8–23)
CALCIUM SERPL-MCNC: 8.6 MG/DL (ref 8.7–10.5)
CHLORIDE SERPL-SCNC: 111 MMOL/L (ref 95–110)
CO2 SERPL-SCNC: 22 MMOL/L (ref 23–29)
CREAT SERPL-MCNC: 1.7 MG/DL (ref 0.5–1.4)
DIFFERENTIAL METHOD: ABNORMAL
EOSINOPHIL # BLD AUTO: 0.4 K/UL (ref 0–0.5)
EOSINOPHIL NFR BLD: 7.4 % (ref 0–8)
ERYTHROCYTE [DISTWIDTH] IN BLOOD BY AUTOMATED COUNT: 16.9 % (ref 11.5–14.5)
EST. GFR  (AFRICAN AMERICAN): 46 ML/MIN/1.73 M^2
EST. GFR  (NON AFRICAN AMERICAN): 40 ML/MIN/1.73 M^2
FINAL PATHOLOGIC DIAGNOSIS: NORMAL
GLUCOSE SERPL-MCNC: 93 MG/DL (ref 70–110)
GROSS: NORMAL
HCT VFR BLD AUTO: 31.8 % (ref 40–54)
HGB BLD-MCNC: 9.8 G/DL (ref 14–18)
IMM GRANULOCYTES # BLD AUTO: 0.02 K/UL (ref 0–0.04)
IMM GRANULOCYTES NFR BLD AUTO: 0.4 % (ref 0–0.5)
LYMPHOCYTES # BLD AUTO: 0.8 K/UL (ref 1–4.8)
LYMPHOCYTES NFR BLD: 14.7 % (ref 18–48)
MAGNESIUM SERPL-MCNC: 1.8 MG/DL (ref 1.6–2.6)
MCH RBC QN AUTO: 27.1 PG (ref 27–31)
MCHC RBC AUTO-ENTMCNC: 30.8 G/DL (ref 32–36)
MCV RBC AUTO: 88 FL (ref 82–98)
MONOCYTES # BLD AUTO: 0.6 K/UL (ref 0.3–1)
MONOCYTES NFR BLD: 10.6 % (ref 4–15)
NEUTROPHILS # BLD AUTO: 3.5 K/UL (ref 1.8–7.7)
NEUTROPHILS NFR BLD: 66.5 % (ref 38–73)
NRBC BLD-RTO: 0 /100 WBC
PHOSPHATE SERPL-MCNC: 3.3 MG/DL (ref 2.7–4.5)
PLATELET # BLD AUTO: 191 K/UL (ref 150–350)
PMV BLD AUTO: 9.6 FL (ref 9.2–12.9)
POTASSIUM SERPL-SCNC: 4 MMOL/L (ref 3.5–5.1)
PROT SERPL-MCNC: 6.7 G/DL (ref 6–8.4)
RBC # BLD AUTO: 3.61 M/UL (ref 4.6–6.2)
SODIUM SERPL-SCNC: 139 MMOL/L (ref 136–145)
WBC # BLD AUTO: 5.29 K/UL (ref 3.9–12.7)

## 2020-06-17 PROCEDURE — 83735 ASSAY OF MAGNESIUM: CPT | Mod: HCNC

## 2020-06-17 PROCEDURE — 97110 THERAPEUTIC EXERCISES: CPT | Mod: HCNC

## 2020-06-17 PROCEDURE — 84100 ASSAY OF PHOSPHORUS: CPT | Mod: HCNC

## 2020-06-17 PROCEDURE — 85025 COMPLETE CBC W/AUTO DIFF WBC: CPT | Mod: HCNC

## 2020-06-17 PROCEDURE — 25000003 PHARM REV CODE 250: Mod: HCNC | Performed by: INTERNAL MEDICINE

## 2020-06-17 PROCEDURE — 85730 THROMBOPLASTIN TIME PARTIAL: CPT | Mod: HCNC

## 2020-06-17 PROCEDURE — 25000003 PHARM REV CODE 250: Mod: HCNC | Performed by: NURSE PRACTITIONER

## 2020-06-17 PROCEDURE — 80053 COMPREHEN METABOLIC PANEL: CPT | Mod: HCNC

## 2020-06-17 PROCEDURE — 97116 GAIT TRAINING THERAPY: CPT | Mod: HCNC

## 2020-06-17 PROCEDURE — 25000003 PHARM REV CODE 250: Mod: HCNC | Performed by: COLON & RECTAL SURGERY

## 2020-06-17 PROCEDURE — 36415 COLL VENOUS BLD VENIPUNCTURE: CPT | Mod: HCNC

## 2020-06-17 RX ORDER — HYDROCODONE BITARTRATE AND ACETAMINOPHEN 7.5; 325 MG/1; MG/1
1 TABLET ORAL EVERY 8 HOURS PRN
Qty: 20 TABLET | Refills: 0 | Status: ON HOLD | OUTPATIENT
Start: 2020-06-17 | End: 2021-01-01 | Stop reason: HOSPADM

## 2020-06-17 RX ADMIN — AMLODIPINE BESYLATE 5 MG: 5 TABLET ORAL at 08:06

## 2020-06-17 RX ADMIN — CARVEDILOL 25 MG: 12.5 TABLET, FILM COATED ORAL at 08:06

## 2020-06-17 RX ADMIN — ATORVASTATIN CALCIUM 40 MG: 40 TABLET, FILM COATED ORAL at 08:06

## 2020-06-17 RX ADMIN — ISOSORBIDE MONONITRATE 120 MG: 60 TABLET, EXTENDED RELEASE ORAL at 08:06

## 2020-06-17 RX ADMIN — CLOPIDOGREL BISULFATE 75 MG: 75 TABLET ORAL at 08:06

## 2020-06-17 RX ADMIN — ASPIRIN 81 MG 81 MG: 81 TABLET ORAL at 08:06

## 2020-06-17 RX ADMIN — PANTOPRAZOLE SODIUM 40 MG: 40 TABLET, DELAYED RELEASE ORAL at 08:06

## 2020-06-17 RX ADMIN — HYDROCODONE BITARTRATE AND ACETAMINOPHEN 1 TABLET: 7.5; 325 TABLET ORAL at 08:06

## 2020-06-17 NOTE — PLAN OF CARE
Plan of care reviewed with pt, pt verbalized understanding. IV site clean, dry, intact, no redness or swelling noted. Midline incision noted, staples intact, colostomy intact, no output this shift. Wound to left stump noted, dressing c/d/I. Prosthesis at bedside, pt remains free of fall/trauma. Pain is controlled with current regimen. VSS. Purposeful q2h rounding done, safety maintained, call light in reach, bed locked and in lowest position, side rails up x2. Will continue to monitor, observe and report any changes.

## 2020-06-17 NOTE — SUBJECTIVE & OBJECTIVE
Interval History:  Tolerating diet without issues having ostomy output    Medications:  Continuous Infusions:    Scheduled Meds:   amLODIPine  5 mg Oral Daily    aspirin  81 mg Oral Daily    atorvastatin  40 mg Oral Daily    carvediloL  25 mg Oral BID    clopidogreL  75 mg Oral Daily    isosorbide mononitrate  120 mg Oral Daily    nozaseptin   Each Nostril BID    pantoprazole  40 mg Oral Daily     PRN Meds:sodium chloride, acetaminophen, albuterol-ipratropium, diphenhydrAMINE, hydrALAZINE, HYDROcodone-acetaminophen, naloxone, ondansetron, promethazine (PHENERGAN) IVPB     Review of patient's allergies indicates:   Allergen Reactions    Morphine Itching     Objective:     Vital Signs (Most Recent):  Temp: 97.7 °F (36.5 °C) (06/17/20 0714)  Pulse: 71 (06/17/20 0714)  Resp: 16 (06/17/20 0714)  BP: (!) 164/79 (06/17/20 0714)  SpO2: 97 % (06/17/20 0714) Vital Signs (24h Range):  Temp:  [97.6 °F (36.4 °C)-98.1 °F (36.7 °C)] 97.7 °F (36.5 °C)  Pulse:  [65-72] 71  Resp:  [16-18] 16  SpO2:  [94 %-97 %] 97 %  BP: (143-167)/(65-80) 164/79     Weight: 75.2 kg (165 lb 12.6 oz)  Body mass index is 22.48 kg/m².    Intake/Output - Last 3 Shifts       06/15 0700 - 06/16 0659 06/16 0700 - 06/17 0659 06/17 0700 - 06/18 0659    P.O. 780 1840     I.V. (mL/kg) 1147.8 (15.3) 940.5 (12.5)     Total Intake(mL/kg) 1927.8 (25.6) 2780.5 (37)     Urine (mL/kg/hr) 1210 (0.7) 2050 (1.1)     Drains       Stool 0 300     Total Output 1210 2350     Net +717.8 +430.5            Urine Occurrence 1 x      Stool Occurrence 0 x            Physical Exam  Constitutional:       Appearance: He is well-developed.   HENT:      Head: Normocephalic and atraumatic.   Eyes:      Extraocular Movements: EOM normal.      Conjunctiva/sclera: Conjunctivae normal.   Neck:      Musculoskeletal: Normal range of motion.      Thyroid: No thyromegaly.   Cardiovascular:      Rate and Rhythm: Normal rate and regular rhythm.   Pulmonary:      Effort: Pulmonary effort  is normal. No respiratory distress.   Abdominal:      Comments: Soft, nondistended, appropriately TTP; incision c/d/i without erythema or drainage; RUQ ostomy pink and moist with pasty stool and gas in bag   Musculoskeletal:         General: No tenderness or edema.      Comments: R foot s/p amputation   Skin:     General: Skin is warm and dry.      Capillary Refill: Capillary refill takes less than 2 seconds.      Findings: No rash.   Neurological:      Mental Status: He is alert and oriented to person, place, and time.   Psychiatric:         Mood and Affect: Mood and affect normal.         Significant Labs:  CBC:   Recent Labs   Lab 06/17/20  0625   WBC 5.29   RBC 3.61*   HGB 9.8*   HCT 31.8*      MCV 88   MCH 27.1   MCHC 30.8*     BMP:   Recent Labs   Lab 06/17/20  0625   GLU 93      K 4.0   *   CO2 22*   BUN 10   CREATININE 1.7*   CALCIUM 8.6*   MG 1.8

## 2020-06-17 NOTE — PT/OT/SLP PROGRESS
"Physical Therapy  Treatment    Kodi Ribeiro   MRN: [id number]   Admitting Diagnosis: Colon adenocarcinoma    PT Received On: 06/17/20  PT Start Time: 0745     PT Stop Time: 0810    PT Total Time (min): 25 min       Billable Minutes:  Gait Training 15 and Therapeutic Exercise 10    Treatment Type: Treatment  PT/PTA: PT     PTA Visit Number: 0       General Precautions: Standard, fall  Orthopedic Precautions: N/A   Braces: N/A         Subjective:  Communicated with NURSE LUNA AND Epic CHART REVIEW prior to session.  PT AGREED TO TX     Pain/Comfort  Pain Rating 1: 0/10  Pain Rating Post-Intervention 1: 0/10    Objective:   Patient found with: telemetry, peripheral IV, colostomy    Functional Mobility:  PT MET IN . PT REPORTED , " I GOING HOME." P.T. ENCOURAGED PT TO PARTICIPATE IN P.T. BEFORE LEAVING AND PT AGREED. PT SUP.SIT EOB IND. PT DONNED AFO AND PROSTHESIS IND. PT STOOD WITH RW AND SBA FOR GT TRAINING. PT GT TRAINED X 100' WITH RW AND CGA WITH ONE LOB. PT RETURNED TO  T/F TO CHAIR WITH SBA. PT SEATED FOR REST AND COMPLETED B LE TE X 15 REPS OF MIP, TKE, AND GLUT SETS. PT LEFT SEATED IN CHAIR WITH CALL BELL IN REACH AND ALL NEEDS MET.     AM-PAC 6 CLICK MOBILITY  How much help from another person does this patient currently need?   1 = Unable, Total/Dependent Assistance  2 = A lot, Maximum/Moderate Assistance  3 = A little, Minimum/Contact Guard/Supervision  4 = None, Modified [place]/Independent    Turning over in bed (including adjusting bedclothes, sheets and blankets)?: 4  Sitting down on and standing up from a chair with arms (e.g., wheelchair, bedside commode, etc.): 4  Moving from lying on back to sitting on the side of the bed?: 4  Moving to and from a bed to a chair (including a wheelchair)?: 4  Need to walk in hospital room?: 3  Climbing 3-5 steps with a railing?: 1  Basic Mobility Total Score: 20    AM-PAC Raw Score CMS G-Code Modifier Level of Impairment Assistance   6 % Total / " Unable   7 - 9 CM 80 - 100% Maximal Assist   10 - 14 CL 60 - 80% Moderate Assist   15 - 19 CK 40 - 60% Moderate Assist   20 - 22 CJ 20 - 40% Minimal Assist   23 CI 1-20% SBA / CGA   24 CH 0% Independent/ Mod I     Patient left up in chair with call button in reach and chair alarm on.    Assessment:  PT PROGRESSING WITH GT.     Rehab identified problem list/impairments: Rehab identified problem list/impairments: weakness, impaired endurance, impaired balance, gait instability, decreased lower extremity function, impaired functional mobilty    Rehab potential is good.    Activity tolerance: Fair    Discharge recommendations: Discharge Facility/Level of Care Needs: home health PT     Barriers to discharge:      Equipment recommendations: Equipment Needed After Discharge: none     GOALS:   Multidisciplinary Problems     Physical Therapy Goals        Problem: Physical Therapy Goal    Goal Priority Disciplines Outcome Goal Variances Interventions   Physical Therapy Goal     PT, PT/OT Ongoing, Progressing     Description: LTGs to be met within 7 days (6/22/20):  1.  Patient will perform bed mobility with modified independence  2.  Patient will perform functional transfers with appropriate AD with SBA  3.  Patient will ambulate 200 feet with appropriate AD with CGA and no gross LOB                   PLAN:    Patient to be seen 5 x/week  to address the above listed problems via gait training, therapeutic activities, therapeutic exercises  Plan of Care expires: 06/22/20  Plan of Care reviewed with: patient         Lauren Romario, PT  06/17/2020

## 2020-06-17 NOTE — CONSULTS
[facility] -   Hematology/Oncology  Consult Note    Patient Name: Kodi Ribeiro  MRN: [id number]  Admission Date: 6/8/2020  Hospital Length of Stay: 8 days  Code Status: Full Code   Attending Provider: Karla Hutton MD  Consulting Provider: Breana Carreon NP  Primary Care Physician: Norma Nuñez MD  Principal Problem:Colon adenocarcinoma    Inpatient consult to Hematology/Oncology  Consult performed by: Breana Carreon NP  Consult ordered by: Lizy Rodriguez NP  Reason for consult: Colon Cancer  Assessment/Recommendations: Patient is refusing chemotherapy and additional oncology treatment. Will sign off and see as outpatient.         Subjective:     HPI:  70-year-old male with past medical history significant for chronic kidney disease, CHF, CVA, coronary artery disease, hypertension, hyperlipidemia, PVD with previous AAA repair who presented with complaints of hematochezia and melena since January intermittently.  He  recently underwent colonoscopy 06/02/2020 that showed malignant-appearing mass in the transverse colon and possibly descending colon.  The above masses were biopsied and confirmed adenocarcinoma.  Who CT abdomen and pelvis showed stable reticulonodular opacity within the right lower lobe otherwise stable abdomen and pelvis.  Oncology was consulted given the new diagnosis of colonic adenocarcinoma.    Oncology Treatment Plan:   [No treatment plan]    Medications:  Continuous Infusions:  Scheduled Meds:   amLODIPine  5 mg Oral Daily    aspirin  81 mg Oral Daily    atorvastatin  40 mg Oral Daily    carvediloL  25 mg Oral BID    clopidogreL  75 mg Oral Daily    isosorbide mononitrate  120 mg Oral Daily    nozaseptin   Each Nostril BID    pantoprazole  40 mg Oral Daily     PRN Meds:sodium chloride, acetaminophen, albuterol-ipratropium, diphenhydrAMINE, hydrALAZINE, HYDROcodone-acetaminophen, naloxone, ondansetron, promethazine (PHENERGAN) IVPB     Review of patient's  allergies indicates:   Allergen Reactions    Morphine Itching        Past Medical History:   Diagnosis Date    Acute on chronic congestive heart failure 1/13/2020    Acute respiratory failure with hypoxia 1/14/2020    Analgesic nephropathy     Anemia     AP (angina pectoris) 1/11/2019    Arthritis     Colon polyp     Repeat colonoscopy due in 9/14    COPD exacerbation 2/6/2020    Coronary artery disease     Diverticulosis     colonoscopy 2/21/2014    Dysthymia 2/13/2020    Encounter for blood transfusion     GERD (gastroesophageal reflux disease)     Hemorrhoids     colonoscopy 2/21/2014    Horseshoe kidney     Hyperglycemia 3/17/2014    Hyperlipidemia     Hypertension     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    Late complications of amputation stump     rseolved with further amputation( MRSA then none since 2014)    Lipoma of colon     colonoscopy 2/21/2014    Myocardial infarction     per patient 2000 & 9/2012    Peripheral vascular disease     Phantom limb syndrome     patient reports only intermittent not problematic, not worsening    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    Spinal cord disease     L4L5 disc    Stroke     Tobacco dependence     resolved    Ureteral stent retained      Past Surgical History:   Procedure Laterality Date    ABDOMINAL AORTIC ANEURYSM REPAIR      ABDOMINAL AORTIC ANEURYSM REPAIR  1996/2014    AMPUTATION, LOWER LIMB      AORTA - BILATERAL FEMORAL ARTERY BYPASS GRAFT  2014    Left and right leg    COLONOSCOPY N/A 6/2/2020    Procedure: COLONOSCOPY;  Surgeon: Rosanna Harrington MD;  Location: [facility] ENDO;  Service: Endoscopy;  Laterality: N/A;    COLOSTOMY N/A 6/11/2020    Procedure: CREATION, COLOSTOMY;  Surgeon: Leonardo Yang MD;  Location: [facility] OR;  Service: General;  Laterality: N/A;    CORONARY ANGIOPLASTY WITH STENT PLACEMENT  2000    Three placed in heart    CYSTOSCOPY W/ RETROGRADES Left 5/29/2018    Procedure: CYSTOSCOPY, WITH  RETROGRADE PYELOGRAM;  Surgeon: Scooter Jin IV, MD;  Location: [facility] OR;  Service: Urology;  Laterality: Left;    CYSTOSCOPY W/ URETERAL STENT PLACEMENT Left 5/29/2018    Procedure: CYSTOSCOPY, WITH URETERAL STENT INSERTION;  Surgeon: Scooter Jin IV, MD;  Location: [facility] OR;  Service: Urology;  Laterality: Left;    CYSTOSCOPY W/ URETERAL STENT PLACEMENT Left 2/4/2020    Procedure: CYSTOSCOPY, WITH URETERAL STENT INSERTION;  Surgeon: Scooter Jin IV, MD;  Location: [facility] OR;  Service: Urology;  Laterality: Left;    CYSTOSCOPY W/ URETERAL STENT REMOVAL Left 5/29/2018    Procedure: CYSTOSCOPY, WITH URETERAL STENT REMOVAL;  Surgeon: Scooter Jin IV, MD;  Location: [facility] OR;  Service: Urology;  Laterality: Left;    CYSTOSCOPY W/ URETERAL STENT REMOVAL Left 2/4/2020    Procedure: CYSTOSCOPY, WITH URETERAL STENT REMOVAL;  Surgeon: Scooter Jin IV, MD;  Location: [facility] OR;  Service: Urology;  Laterality: Left;    ESOPHAGOGASTRODUODENOSCOPY N/A 6/2/2020    Procedure: EGD (ESOPHAGOGASTRODUODENOSCOPY);  Surgeon: Rosanna Harrington MD;  Location: [facility] ENDO;  Service: Endoscopy;  Laterality: N/A;    FOOT AMPUTATION THROUGH METATARSAL  1996    left    FOOT SURGERY Bilateral 1980's    per patient multiple toe amputations prior to.  partial foot amputation:first great toe then other toes     INJECTION OF ANESTHETIC AGENT INTO TISSUE PLANE DEFINED BY TRANSVERSUS ABDOMINIS MUSCLE N/A 6/11/2020    Procedure: BLOCK, TRANSVERSUS ABDOMINIS PLANE;  Surgeon: Leonardo Yang MD;  Location: [facility] OR;  Service: General;  Laterality: N/A;    KIDNEY SURGERY  2014    per patient separation of horseshoe kidney @ time of AAA repair    LEFT HEART CATHETERIZATION Left 3/7/2019    Procedure: CATHETERIZATION, HEART, LEFT;  Surgeon: Adriel Boone MD;  Location: [facility] CATH LAB;  Service: Cardiology;  Laterality: Left;  630 admit for IV hydration  10am start    LUNG LOBECTOMY Right 1970s    per patient not cancer     LYSIS OF ADHESIONS N/A 2020    Procedure: LYSIS, ADHESIONS;  Surgeon: Leonardo Yang MD;  Location: [facility] OR;  Service: General;  Laterality: N/A;    OMENTECTOMY N/A 2020    Procedure: OMENTECTOMY;  Surgeon: Leonardo Yang MD;  Location: [facility] OR;  Service: General;  Laterality: N/A;    right below knee amputation   (approx)    SMALL INTESTINE SURGERY  2014    per patient partial @ time of aaa repair  not small bowel - large bowel bowel compromised bythtwe AAAbowel    SUBTOTAL COLECTOMY N/A 2020    Procedure: COLECTOMY, PARTIAL;  Surgeon: Leonardo Yang MD;  Location: [facility] OR;  Service: General;  Laterality: N/A;    TONSILLECTOMY   aprox    URETERAL STENT PLACEMENT Left     annually replaced since  or so  Dr Jin     Family History     Problem Relation (Age of Onset)    COPD Mother    Cancer Mother    Diabetes Daughter    Heart disease Father        Tobacco Use    Smoking status: Former Smoker     Packs/day: 1.00     Years: 15.00     Pack years: 15.00     Quit date: 2009     Years since quittin.4    Smokeless tobacco: Never Used   Substance and Sexual Activity    Alcohol use: No    Drug use: No     Comment: Is on prescription opiod, no non prescribed use    Sexual activity: Not Currently     Partners: Female       Review of Systems   Constitutional: Negative for chills, diaphoresis, fever and unexpected weight change.   HENT: Negative for congestion, hearing loss and trouble swallowing.    Eyes: Negative for discharge and visual disturbance.   Respiratory: Negative for shortness of breath.    Cardiovascular: Negative for chest pain and palpitations.   Gastrointestinal: Negative for constipation, diarrhea, nausea and vomiting.   Endocrine: Negative for cold intolerance and heat intolerance.   Genitourinary: Negative for difficulty urinating, dysuria and hematuria.   Musculoskeletal: Negative for arthralgias and myalgias.   Skin: Negative.    Neurological:  Negative for dizziness and weakness.   Hematological: Negative for adenopathy. Does not bruise/bleed easily.   Psychiatric/Behavioral: The patient is nervous/anxious.      Objective:     Vital Signs (Most Recent):  Temp: 97.7 °F (36.5 °C) (06/17/20 0714)  Pulse: 71 (06/17/20 0714)  Resp: 16 (06/17/20 0714)  BP: (!) 164/79 (06/17/20 0714)  SpO2: 97 % (06/17/20 0714) Vital Signs (24h Range):  Temp:  [97.6 °F (36.4 °C)-98.1 °F (36.7 °C)] 97.7 °F (36.5 °C)  Pulse:  [65-72] 71  Resp:  [16-18] 16  SpO2:  [94 %-97 %] 97 %  BP: (143-167)/(65-80) 164/79     Weight: 75.2 kg (165 lb 12.6 oz)  Body mass index is 22.48 kg/m².  Body surface area is 1.95 meters squared.      Intake/Output Summary (Last 24 hours) at 6/17/2020 0807  Last data filed at 6/17/2020 0600  Gross per 24 hour   Intake 2780.5 ml   Output 1900 ml   Net 880.5 ml       Physical Exam  Constitutional:       General: He is not in acute distress.     Appearance: He is well-developed and well-nourished. He is not diaphoretic.   HENT:      Head: Normocephalic and atraumatic.      Right Ear: Hearing and external ear normal.      Left Ear: Hearing and external ear normal.      Nose: Nose normal. No mucosal edema, rhinorrhea or epistaxis.      Mouth/Throat:      Mouth: Oropharynx is clear and moist and mucous membranes are normal.      Pharynx: Uvula midline.   Eyes:      General:         Right eye: No discharge.         Left eye: No discharge.      Extraocular Movements: EOM normal.      Conjunctiva/sclera: Conjunctivae normal.      Right eye: No chemosis.     Left eye: No chemosis.     Pupils: Pupils are equal, round, and reactive to light.   Neck:      Musculoskeletal: Normal range of motion and neck supple.      Thyroid: No thyroid mass or thyromegaly.      Trachea: Trachea normal.   Cardiovascular:      Rate and Rhythm: Normal rate and regular rhythm.      Pulses: Intact distal pulses.           Dorsalis pedis pulses are 2+ on the right side and 2+ on the left  side.      Heart sounds: Normal heart sounds. No murmur.   Pulmonary:      Effort: Pulmonary effort is normal. No respiratory distress.      Breath sounds: Normal breath sounds. No decreased breath sounds or wheezing.   Abdominal:      General: Bowel sounds are normal. There is no distension.      Palpations: Abdomen is soft.      Tenderness: There is no abdominal tenderness.   Musculoskeletal: Normal range of motion.   Lymphadenopathy:      Cervical: No cervical adenopathy.      Upper Body:      Right upper body: No supraclavicular adenopathy.      Left upper body: No supraclavicular adenopathy.   Skin:     General: Skin is warm and dry.      Capillary Refill: Capillary refill takes less than 2 seconds.      Findings: No rash.   Neurological:      Mental Status: He is alert and oriented to person, place, and time.   Psychiatric:         Mood and Affect: Mood is anxious.         Speech: Speech normal.         Behavior: Behavior normal.         Thought Content: Thought content normal.         Judgment: Judgment normal.         Significant Labs:   CBC:   Recent Labs   Lab 06/17/20  0625   WBC 5.29   HGB 9.8*   HCT 31.8*       and CMP:   Recent Labs   Lab 06/17/20  0625      K 4.0   *   CO2 22*   GLU 93   BUN 10   CREATININE 1.7*   CALCIUM 8.6*   PROT 6.7   ALBUMIN 2.7*   BILITOT 0.4   ALKPHOS 148*   AST 12   ALT 11   ANIONGAP 6*   EGFRNONAA 40*       Diagnostic Results:  I have reviewed all pertinent imaging results/findings within the past 24 hours.    Assessment/Plan:     * Colon adenocarcinoma  Status post open partial transverse colectomy with end proximal transverse colostomy on 06/11/2020.  -postsurgical management per surgery service.  -discussed with patient the natural history and prognosis of metastatic colonic adenocarcinoma.  Patient remains adamant about refusal of chemotherapy.  -recommend outpatient follow-up for further discussion.  --PM has seen patient- See Note    Acute blood loss  anemia  Admitted with c/o GI bleeding secondary to Colon cancer. S/P colon resection    --Daily CBC, CMP  --Transfuse for Hgb <7.0        Thank you for your consult. I will sign off. Please contact us if you have any additional questions.    Breana Carreon NP  Hematology/Oncology  [facility]

## 2020-06-17 NOTE — PLAN OF CARE
Advised patient of rights to appeal discharge from hospital, patient received written notification of discharge appeal process . Patient signed and dated PT verbalized understanding of rights.       06/17/20 1058   Medicare Message   Important Message from Medicare regarding Discharge Appeal Rights Given to patient/caregiver   Date IMM was signed 06/17/20   Time IMM was signed 104

## 2020-06-17 NOTE — ASSESSMENT & PLAN NOTE
71yo M with multiple medical comorbidities who presents with transverse vs descending colonic adenocarcinoma with possible metastatic lung disease with acute lower GI bleed, recurrent who is now s/p open partial transverse colectomy with end proximal transverse colostomy on 6/11/2020    -Tolerating regular diet  - cont ostomy nurse evaluation/education  - okay to resume oral antiplatelet therapy from surgical perspective   - OOB encouraged  - PPx: ppi  -okay to discharge follow-up with Dr. Yang in clinic in 2 weeks

## 2020-06-17 NOTE — PLAN OF CARE
Referral sent to [facility] via [employer]. Preference was signed.     06/17/20 1103   Post-Acute Status   Post-Acute Authorization [facility]   Home Health Status Referrals Sent

## 2020-06-17 NOTE — PLAN OF CARE
06/17/20 1633   Final Note   Assessment Type Final Discharge Note   Anticipated Discharge Disposition Home-Health   Right Care Referral Info   Post Acute Recommendation Home-care   Facility Name [facility]

## 2020-06-17 NOTE — PROGRESS NOTES
[facility] -   General Surgery  Progress Note    Subjective:     History of Present Illness:  70 y.o. male well known to me who is admitted to the hospital with a LGIB. Pt has known colonic adenocarcinoma which is likely his source of bleeding, as he had colonoscopy last week showing other polyps as well as known carcinoma. Pt has a PMHx significant for anemia, GERD, HTN, HLD, CKD, CHF, CVA, CAD and PVD with previous AAA repair who recently underwent a colonoscopy on 06/02/2020 where a malignant-appearing mass was seen in the transverse colon, possible descending colon and biopsied.  Biopsies did confirm this to be adenocarcinoma.  Patient states that he had been having both hematochezia and melena since January intermittently. Patient's last colonoscopy prior to this 1 was in February 2014.  Patient has significant past surgical history including multiple previous abdominal operations including AAA repair, AAA graft excision and right ax fem bypass with small-bowel resection.  He denies current fever, chills, nausea, vomiting.  Patient reports a significant past coronary history requiring stent placement and likely need for further stent/CABG but is unable to undergo at this time due to high risk nature and has chosen medical therapy. Surgery consulted for evaluation of colonic adenoCA with bleeding.    Post-Op Info:  Procedure(s) (LRB):  COLECTOMY, PARTIAL (N/A)  BLOCK, TRANSVERSUS ABDOMINIS PLANE (N/A)  LYSIS, ADHESIONS (N/A)  CREATION, COLOSTOMY (N/A)  OMENTECTOMY (N/A)   6 Days Post-Op     Interval History:  Tolerating diet without issues having ostomy output    Medications:  Continuous Infusions:    Scheduled Meds:   amLODIPine  5 mg Oral Daily    aspirin  81 mg Oral Daily    atorvastatin  40 mg Oral Daily    carvediloL  25 mg Oral BID    clopidogreL  75 mg Oral Daily    isosorbide mononitrate  120 mg Oral Daily    nozaseptin   Each Nostril BID    pantoprazole  40 mg Oral Daily     PRN  Meds:sodium chloride, acetaminophen, albuterol-ipratropium, diphenhydrAMINE, hydrALAZINE, HYDROcodone-acetaminophen, naloxone, ondansetron, promethazine (PHENERGAN) IVPB     Review of patient's allergies indicates:   Allergen Reactions    Morphine Itching     Objective:     Vital Signs (Most Recent):  Temp: 97.7 °F (36.5 °C) (06/17/20 0714)  Pulse: 71 (06/17/20 0714)  Resp: 16 (06/17/20 0714)  BP: (!) 164/79 (06/17/20 0714)  SpO2: 97 % (06/17/20 0714) Vital Signs (24h Range):  Temp:  [97.6 °F (36.4 °C)-98.1 °F (36.7 °C)] 97.7 °F (36.5 °C)  Pulse:  [65-72] 71  Resp:  [16-18] 16  SpO2:  [94 %-97 %] 97 %  BP: (143-167)/(65-80) 164/79     Weight: 75.2 kg (165 lb 12.6 oz)  Body mass index is 22.48 kg/m².    Intake/Output - Last 3 Shifts       06/15 0700 - 06/16 0659 06/16 0700 - 06/17 0659 06/17 0700 - 06/18 0659    P.O. 780 1840     I.V. (mL/kg) 1147.8 (15.3) 940.5 (12.5)     Total Intake(mL/kg) 1927.8 (25.6) 2780.5 (37)     Urine (mL/kg/hr) 1210 (0.7) 2050 (1.1)     Drains       Stool 0 300     Total Output 1210 2350     Net +717.8 +430.5            Urine Occurrence 1 x      Stool Occurrence 0 x            Physical Exam  Constitutional:       Appearance: He is well-developed.   HENT:      Head: Normocephalic and atraumatic.   Eyes:      Extraocular Movements: EOM normal.      Conjunctiva/sclera: Conjunctivae normal.   Neck:      Musculoskeletal: Normal range of motion.      Thyroid: No thyromegaly.   Cardiovascular:      Rate and Rhythm: Normal rate and regular rhythm.   Pulmonary:      Effort: Pulmonary effort is normal. No respiratory distress.   Abdominal:      Comments: Soft, nondistended, appropriately TTP; incision c/d/i without erythema or drainage; RUQ ostomy pink and moist with pasty stool and gas in bag   Musculoskeletal:         General: No tenderness or edema.      Comments: R foot s/p amputation   Skin:     General: Skin is warm and dry.      Capillary Refill: Capillary refill takes less than 2 seconds.       Findings: No rash.   Neurological:      Mental Status: He is alert and oriented to person, place, and time.   Psychiatric:         Mood and Affect: Mood and affect normal.         Significant Labs:  CBC:   Recent Labs   Lab 06/17/20  0625   WBC 5.29   RBC 3.61*   HGB 9.8*   HCT 31.8*      MCV 88   MCH 27.1   MCHC 30.8*     BMP:   Recent Labs   Lab 06/17/20  0625   GLU 93      K 4.0   *   CO2 22*   BUN 10   CREATININE 1.7*   CALCIUM 8.6*   MG 1.8     Assessment/Plan:     * Colon adenocarcinoma  71yo M with multiple medical comorbidities who presents with transverse vs descending colonic adenocarcinoma with possible metastatic lung disease with acute lower GI bleed, recurrent who is now s/p open partial transverse colectomy with end proximal transverse colostomy on 6/11/2020    -Tolerating regular diet  - cont ostomy nurse evaluation/education  - okay to resume oral antiplatelet therapy from surgical perspective   - OOB encouraged  - PPx: ppi  -okay to discharge follow-up with Dr. Yang in clinic in 2 weeks    COPD (chronic obstructive pulmonary disease)  Management per primary team    Coronary artery disease involving native coronary artery of native heart without angina pectoris  Management per primary team    Acute on chronic kidney failure  Management per primary team    PVD (peripheral vascular disease)  Management per primary team    Acute blood loss anemia  Management per primary team    Essential hypertension  Management per primary team    Hyperlipidemia  Management per primary team        Kartik Baca MD  General Surgery  [facility]

## 2020-06-17 NOTE — TELEPHONE ENCOUNTER
Spoke to pt's wife - Cancelled the appt already scheduled with Dr Yang for 6/22/2020 - Scheduled Post Op App[facility] Dr [facility] - Wife verbalized understanding     ----- Message from Karolina Davies RN sent at 6/17/2020 12:58 PM CDT -----  Patient will need a two week post op appointment set.

## 2020-06-17 NOTE — DISCHARGE SUMMARY
[facility] Hospital Medicine  Discharge Summary      Patient Name: Kodi Ribeiro  MRN: [id number]  Admission Date: 6/8/2020  Hospital Length of Stay: 8 days  Discharge Date and Time:  06/17/2020 1:22 PM  Attending Physician: No att. providers found   Discharging Provider: Lizy Rodriguez NP  Primary Care Provider: Norma Nuñez MD      HPI:   Kodi Ribeiro is a 70 y.o. male patient with a PMHx of CVA, CHF, COPD, CAD, diverticulosis, GERD, PVD, hyperglycemia, HLD, HTN, Anemia, and MI who presents to the Emergency Department for evaluation of rectal bleeding which onset gradually earlier today. Pt reports having had a bloody stool this morning. Pt states that he was on Plavix, but was stopped by his PCP 10 days ago when he had similar sxs but has continued to take ASA. Pt had a hx of hematochezia since 01/2020 and had colonoscopy last Tuesday with colon cancer diagnosis. Pt seen by Dr. Wilkerson (General Surgery) as outpatient yesterday with possible options for surgical intervention discussed.  Symptoms are intermittent and moderate in severity. No mitigating or exacerbating factors reported. Associated sxs include constipation, hematochezia, abdominal pain when making a BM, diaphoresis, chills, and syncope.  Patient denies any CP, hematuria, N/V, SOB, light-headedness, and all other sxs at this time. No prior Tx reported. No further complaints or concerns at this time.  Pt denies smoking and use of ETOH.  Pt is a full code and Laury O'Connnor (wife) at [phone] is the surrogate decision maker.  ER work up showed: H/H 7.5/24.4, CO2 16, Ca 8.2, BUN/Creatinine 44/3.2, Glucose 159, and Albumin 3.3.  CT abdomen/pelvis performed and CT of chest/abdomen/pelvis results on 6/5/2020 reviewed.  ER discussed case with GI and General Surgery.  Hospital Medicine contacted for admission with patient placed in Observation for further evaluation.      Procedure(s) (LRB):  COLECTOMY, PARTIAL (N/A)  BLOCK,  TRANSVERSUS ABDOMINIS PLANE (N/A)  LYSIS, ADHESIONS (N/A)  CREATION, COLOSTOMY (N/A)  OMENTECTOMY (N/A)      Hospital Course:   6/10/20: No events; Last BM was yesterday AM ([facility]), no BM since, which he attributes to being NPO, but today was on clears and is NPO after MN for scheduled general surgery for possible resection of adenocarcinoma in AM. No cp or sob.  - Ordered 2 more units RBCs (completed a total of 3U yesterday).   6/11/20: s/p partial bowel resection with colostomy; tolerated surgery well without obvious intra-operative complications  - Pain control  - Continue to monitor closely  6/12/20: POD#2; no events o/n; sBP's 153-182 range, which is likely 2/2 pain, which he describes as abn tenderness w/ movement  6/13/20-doing well. NGT clamped. Passing some gas.   As of 6/14/2020 NGT removed, but will remain NPO except some ice chips. Will place order for PT/OT to encourage mobility. Creatinine continues to trend down. No gas noted from colostomy.   6/15/20  Diet advanced to clear liquid diet. Tolerating heparin infusion without bleeding complication. Ambulated down hallway with PT/OT.   6/17/20  Patient is doing well. Tolerating full diet. Spouse educated on ostomy care. He will be discharged with family support and home health.      Consults:   Consults (From admission, onward)        Status Ordering Provider     Inpatient consult to Cardiology  Once     Provider:  (Not yet assigned)    Completed SEDA DESIR     Inpatient consult to Gastroenterology  Once     Provider:  Edenilson Beckman MD    Completed ROMELIA WHITE     Inpatient consult to General surgery  Once     Provider:  Romelia White MD    Completed ROYCE GUTIÉRREZ     Inpatient consult to Hematology/Oncology  Once     Provider:  Missael Choi MD    Completed LENIN HEIN     Inpatient consult to Palliative Care  Once     Provider:  Tika Villaseñor PA-C    Completed JEFFERSON MERRILL          No new Assessment & Plan notes  have been filed under this hospital service since the last note was generated.  Service: Hospital Medicine    Final Active Diagnoses:    Diagnosis Date Noted POA    PRINCIPAL PROBLEM:  Colon adenocarcinoma [C18.9] 06/09/2020 Yes    Encounter for palliative care [Z51.5] 06/15/2020 Not Applicable    COPD (chronic obstructive pulmonary disease) [J44.9] 06/09/2020 Yes    Pulmonary nodules [R91.8] 06/09/2020 No    Coronary artery disease involving native coronary artery of native heart without angina pectoris [I25.10] 02/01/2020 Yes    Acute on chronic kidney failure [N17.9, N18.9] 03/27/2019 No    PVD (peripheral vascular disease) [I73.9] 11/25/2013 Yes     Chronic    Acute blood loss anemia [D62] 11/25/2013 Yes    Hyperlipidemia [E78.5] 06/18/2013 Yes     Chronic    Essential hypertension [I10] 06/18/2013 Yes     Chronic      Problems Resolved During this Admission:    Diagnosis Date Noted Date Resolved POA    Acute lower GI bleeding [K92.2] 06/09/2020 06/16/2020 Yes    Leukocytosis [D72.829] 06/09/2020 06/13/2020 Yes    Preop cardiovascular exam [Z01.810] 04/27/2017 06/14/2020 Not Applicable       Discharged Condition: stable    Disposition: Home-Health Care [facility]    Follow Up:  Follow-up Information     Norma Nuñez MD. Schedule an appointment as soon as possible for a visit in 3 days.    Specialty: Family Medicine  Contact information:  [address] [address] [address] [phone]             [facility].    Specialty: Home Health Services  Why: Home Health: Agency will call and set up appointment for day after discharge  Contact information:  [address], [address]  [facility] [address] [phone]                 Patient Instructions:      Ambulatory referral/consult to Home Health   Referral Priority: Routine Referral Type: Home Health   Referral Reason: Specialty Services Required   Requested Specialty: Home Health Services   Number of Visits  Requested: 1       Significant Diagnostic Studies: Labs:   CMP   Recent Labs   Lab 06/17/20  0625      K 4.0   *   CO2 22*   GLU 93   BUN 10   CREATININE 1.7*   CALCIUM 8.6*   PROT 6.7   ALBUMIN 2.7*   BILITOT 0.4   ALKPHOS 148*   AST 12   ALT 11   ANIONGAP 6*   ESTGFRAFRICA 46*   EGFRNONAA 40*    and CBC   Recent Labs   Lab 06/17/20  0625   WBC 5.29   HGB 9.8*   HCT 31.8*          Pending Diagnostic Studies:     Procedure Component Value Units Date/Time    Specimen to Pathology, Surgery General Surgery [[id number]] Collected: 06/11/20 1143    Order Status: Sent Lab Status: In process Updated: 06/11/20 1143         Medications:  Reconciled Home Medications:      Medication List      START taking these medications    HYDROcodone-acetaminophen 7.5-325 mg per tablet  Commonly known as: NORCO  Take 1 tablet by mouth every 8 (eight) hours as needed for Pain.        CONTINUE taking these medications    albuterol-ipratropium 2.5 mg-0.5 mg/3 mL nebulizer solution  Commonly known as: DUO-NEB  Take 3 mLs by nebulization every 8 (eight) hours as needed for Wheezing or Shortness of Breath. Rescue     amLODIPine 10 MG tablet  Commonly known as: NORVASC  TAKE 1 TABLET EVERY DAY     aspirin 81 MG EC tablet  Commonly known as: ECOTRIN  Take 1 tablet (81 mg total) by mouth once daily.     atorvastatin 40 MG tablet  Commonly known as: LIPITOR  Take 1 tablet (40 mg total) by mouth once daily.     carvediloL 25 MG tablet  Commonly known as: COREG  Take 1 tablet (25 mg total) by mouth 2 (two) times daily with meals.     cetirizine 10 MG tablet  Commonly known as: ZYRTEC  Take 10 mg by mouth once daily.     folic acid-vit B6-vit B12 2.5-25-2 mg 2.5-25-2 mg Tab  Commonly known as: FOLBIC or Equiv  Take 1 tablet by mouth once daily.     furosemide 20 MG tablet  Commonly known as: LASIX  Take two tab on Mon/Wed/Friday and one tab on Tues/Thurs/ Sat/Sunday     hydrALAZINE 25 MG tablet  Commonly known as: APRESOLINE  Take 1  tablet (25 mg total) by mouth every 12 (twelve) hours.     isosorbide mononitrate 120 MG 24 hr tablet  Commonly known as: IMDUR  Take 1 tablet by mouth once daily.     mupirocin 2 % ointment  Commonly known as: BACTROBAN  Apply topically 3 (three) times daily.     nitroglycerin 0.6 MG Subl  Commonly known as: NITROSTAT  Place 1 tablet (0.6 mg total) under the tongue every 5 (five) minutes as needed (max 3/ per episode).     omeprazole 20 MG capsule  Commonly known as: PRILOSEC  TAKE 1 CAPSULE TWICE DAILY     OXYGEN-AIR DELIVERY SYSTEMS MISC  by Misc.(Non-Drug; Combo Route) route.     sertraline 50 MG tablet  Commonly known as: ZOLOFT  Take 1 tablet (50 mg total) by mouth once daily.     triamcinolone acetonide 0.1% 0.1 % cream  Commonly known as: KENALOG  Apply topically 2 (two) times daily.            Indwelling Lines/Drains at time of discharge:   Lines/Drains/Airways     Drain                 Colostomy 06/11/20 1209 Transverse RUQ 6 days                Time spent on the discharge of patient: >35 minutes  Patient was seen and examined on the date of discharge and determined to be suitable for discharge.         Lizy Rodriguez NP  Department of Hospital Medicine  [facility]

## 2020-06-17 NOTE — NURSING
Patient stable for discharge. IV and heart monitor removed. Patient received prescriptions from pharmacy. Discharge summary received, patient and wife verbalize understanding. Patient discharging home. Message sent to Dr. Yang's office for 2 week follow up appt.

## 2020-06-17 NOTE — SUBJECTIVE & OBJECTIVE
Oncology Treatment Plan:   [No treatment plan]    Medications:  Continuous Infusions:  Scheduled Meds:   amLODIPine  5 mg Oral Daily    aspirin  81 mg Oral Daily    atorvastatin  40 mg Oral Daily    carvediloL  25 mg Oral BID    clopidogreL  75 mg Oral Daily    isosorbide mononitrate  120 mg Oral Daily    nozaseptin   Each Nostril BID    pantoprazole  40 mg Oral Daily     PRN Meds:sodium chloride, acetaminophen, albuterol-ipratropium, diphenhydrAMINE, hydrALAZINE, HYDROcodone-acetaminophen, naloxone, ondansetron, promethazine (PHENERGAN) IVPB     Review of patient's allergies indicates:   Allergen Reactions    Morphine Itching        Past Medical History:   Diagnosis Date    Acute on chronic congestive heart failure 1/13/2020    Acute respiratory failure with hypoxia 1/14/2020    Analgesic nephropathy     Anemia     AP (angina pectoris) 1/11/2019    Arthritis     Colon polyp     Repeat colonoscopy due in 9/14    COPD exacerbation 2/6/2020    Coronary artery disease     Diverticulosis     colonoscopy 2/21/2014    Dysthymia 2/13/2020    Encounter for blood transfusion     GERD (gastroesophageal reflux disease)     Hemorrhoids     colonoscopy 2/21/2014    Horseshoe kidney     Hyperglycemia 3/17/2014    Hyperlipidemia     Hypertension     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    Late complications of amputation stump     rseolved with further amputation( MRSA then none since 2014)    Lipoma of colon     colonoscopy 2/21/2014    Myocardial infarction     per patient 2000 & 9/2012    Peripheral vascular disease     Phantom limb syndrome     patient reports only intermittent not problematic, not worsening    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    Spinal cord disease     L4L5 disc    Stroke     Tobacco dependence     resolved    Ureteral stent retained      Past Surgical History:   Procedure Laterality Date    ABDOMINAL AORTIC ANEURYSM REPAIR      ABDOMINAL AORTIC ANEURYSM  REPAIR  1996/2014    AMPUTATION, LOWER LIMB      AORTA - BILATERAL FEMORAL ARTERY BYPASS GRAFT  2014    Left and right leg    COLONOSCOPY N/A 6/2/2020    Procedure: COLONOSCOPY;  Surgeon: Rosanna Harrington MD;  Location: [facility] ENDO;  Service: Endoscopy;  Laterality: N/A;    COLOSTOMY N/A 6/11/2020    Procedure: CREATION, COLOSTOMY;  Surgeon: Leonardo Yang MD;  Location: [facility] OR;  Service: General;  Laterality: N/A;    CORONARY ANGIOPLASTY WITH STENT PLACEMENT  2000    Three placed in heart    CYSTOSCOPY W/ RETROGRADES Left 5/29/2018    Procedure: CYSTOSCOPY, WITH RETROGRADE PYELOGRAM;  Surgeon: Scooter Jin IV, MD;  Location: [facility] OR;  Service: Urology;  Laterality: Left;    CYSTOSCOPY W/ URETERAL STENT PLACEMENT Left 5/29/2018    Procedure: CYSTOSCOPY, WITH URETERAL STENT INSERTION;  Surgeon: Scooter Jin IV, MD;  Location: [facility] OR;  Service: Urology;  Laterality: Left;    CYSTOSCOPY W/ URETERAL STENT PLACEMENT Left 2/4/2020    Procedure: CYSTOSCOPY, WITH URETERAL STENT INSERTION;  Surgeon: Scooter Jin IV, MD;  Location: [facility] OR;  Service: Urology;  Laterality: Left;    CYSTOSCOPY W/ URETERAL STENT REMOVAL Left 5/29/2018    Procedure: CYSTOSCOPY, WITH URETERAL STENT REMOVAL;  Surgeon: Scooter Jin IV, MD;  Location: [facility] OR;  Service: Urology;  Laterality: Left;    CYSTOSCOPY W/ URETERAL STENT REMOVAL Left 2/4/2020    Procedure: CYSTOSCOPY, WITH URETERAL STENT REMOVAL;  Surgeon: Scooter Jin IV, MD;  Location: [facility] OR;  Service: Urology;  Laterality: Left;    ESOPHAGOGASTRODUODENOSCOPY N/A 6/2/2020    Procedure: EGD (ESOPHAGOGASTRODUODENOSCOPY);  Surgeon: Rosanna Harrington MD;  Location: [facility] ENDO;  Service: Endoscopy;  Laterality: N/A;    FOOT AMPUTATION THROUGH METATARSAL  1996    left    FOOT SURGERY Bilateral 1980's    per patient multiple toe amputations prior to.  partial foot amputation:first great toe then other toes     INJECTION OF ANESTHETIC AGENT INTO TISSUE  PLANE DEFINED BY TRANSVERSUS ABDOMINIS MUSCLE N/A 2020    Procedure: BLOCK, TRANSVERSUS ABDOMINIS PLANE;  Surgeon: Leonardo Yang MD;  Location: [facility] OR;  Service: General;  Laterality: N/A;    KIDNEY SURGERY      per patient separation of horseshoe kidney @ time of AAA repair    LEFT HEART CATHETERIZATION Left 3/7/2019    Procedure: CATHETERIZATION, HEART, LEFT;  Surgeon: Adriel Boone MD;  Location: [facility] CATH LAB;  Service: Cardiology;  Laterality: Left;  630 admit for IV hydration  10am start    LUNG LOBECTOMY Right     per patient not cancer    LYSIS OF ADHESIONS N/A 2020    Procedure: LYSIS, ADHESIONS;  Surgeon: Leonardo Yang MD;  Location: [facility] OR;  Service: General;  Laterality: N/A;    OMENTECTOMY N/A 2020    Procedure: OMENTECTOMY;  Surgeon: Leonardo Yang MD;  Location: [facility] OR;  Service: General;  Laterality: N/A;    right below knee amputation   (approx)    SMALL INTESTINE SURGERY      per patient partial @ time of aaa repair  not small bowel - large bowel bowel compromised bythtwe AAAbowel    SUBTOTAL COLECTOMY N/A 2020    Procedure: COLECTOMY, PARTIAL;  Surgeon: Leonardo Yang MD;  Location: [facility] OR;  Service: General;  Laterality: N/A;    TONSILLECTOMY   aprox    URETERAL STENT PLACEMENT Left     annually replaced since 2012 or so  Dr Jin     Family History     Problem Relation (Age of Onset)    COPD Mother    Cancer Mother    Diabetes Daughter    Heart disease Father        Tobacco Use    Smoking status: Former Smoker     Packs/day: 1.00     Years: 15.00     Pack years: 15.00     Quit date: 2009     Years since quittin.4    Smokeless tobacco: Never Used   Substance and Sexual Activity    Alcohol use: No    Drug use: No     Comment: Is on prescription opiod, no non prescribed use    Sexual activity: Not Currently     Partners: Female       Review of Systems   Constitutional: Negative for chills, diaphoresis, fever  and unexpected weight change.   HENT: Negative for congestion, hearing loss and trouble swallowing.    Eyes: Negative for discharge and visual disturbance.   Respiratory: Negative for shortness of breath.    Cardiovascular: Negative for chest pain and palpitations.   Gastrointestinal: Negative for constipation, diarrhea, nausea and vomiting.   Endocrine: Negative for cold intolerance and heat intolerance.   Genitourinary: Negative for difficulty urinating, dysuria and hematuria.   Musculoskeletal: Negative for arthralgias and myalgias.   Skin: Negative.    Neurological: Negative for dizziness and weakness.   Hematological: Negative for adenopathy. Does not bruise/bleed easily.   Psychiatric/Behavioral: The patient is nervous/anxious.      Objective:     Vital Signs (Most Recent):  Temp: 97.7 °F (36.5 °C) (06/17/20 0714)  Pulse: 71 (06/17/20 0714)  Resp: 16 (06/17/20 0714)  BP: (!) 164/79 (06/17/20 0714)  SpO2: 97 % (06/17/20 0714) Vital Signs (24h Range):  Temp:  [97.6 °F (36.4 °C)-98.1 °F (36.7 °C)] 97.7 °F (36.5 °C)  Pulse:  [65-72] 71  Resp:  [16-18] 16  SpO2:  [94 %-97 %] 97 %  BP: (143-167)/(65-80) 164/79     Weight: 75.2 kg (165 lb 12.6 oz)  Body mass index is 22.48 kg/m².  Body surface area is 1.95 meters squared.      Intake/Output Summary (Last 24 hours) at 6/17/2020 0807  Last data filed at 6/17/2020 0600  Gross per 24 hour   Intake 2780.5 ml   Output 1900 ml   Net 880.5 ml       Physical Exam  Constitutional:       General: He is not in acute distress.     Appearance: He is well-developed and well-nourished. He is not diaphoretic.   HENT:      Head: Normocephalic and atraumatic.      Right Ear: Hearing and external ear normal.      Left Ear: Hearing and external ear normal.      Nose: Nose normal. No mucosal edema, rhinorrhea or epistaxis.      Mouth/Throat:      Mouth: Oropharynx is clear and moist and mucous membranes are normal.      Pharynx: Uvula midline.   Eyes:      General:         Right eye: No  discharge.         Left eye: No discharge.      Extraocular Movements: EOM normal.      Conjunctiva/sclera: Conjunctivae normal.      Right eye: No chemosis.     Left eye: No chemosis.     Pupils: Pupils are equal, round, and reactive to light.   Neck:      Musculoskeletal: Normal range of motion and neck supple.      Thyroid: No thyroid mass or thyromegaly.      Trachea: Trachea normal.   Cardiovascular:      Rate and Rhythm: Normal rate and regular rhythm.      Pulses: Intact distal pulses.           Dorsalis pedis pulses are 2+ on the right side and 2+ on the left side.      Heart sounds: Normal heart sounds. No murmur.   Pulmonary:      Effort: Pulmonary effort is normal. No respiratory distress.      Breath sounds: Normal breath sounds. No decreased breath sounds or wheezing.   Abdominal:      General: Bowel sounds are normal. There is no distension.      Palpations: Abdomen is soft.      Tenderness: There is no abdominal tenderness.   Musculoskeletal: Normal range of motion.   Lymphadenopathy:      Cervical: No cervical adenopathy.      Upper Body:      Right upper body: No supraclavicular adenopathy.      Left upper body: No supraclavicular adenopathy.   Skin:     General: Skin is warm and dry.      Capillary Refill: Capillary refill takes less than 2 seconds.      Findings: No rash.   Neurological:      Mental Status: He is alert and oriented to person, place, and time.   Psychiatric:         Mood and Affect: Mood is anxious.         Speech: Speech normal.         Behavior: Behavior normal.         Thought Content: Thought content normal.         Judgment: Judgment normal.         Significant Labs:   CBC:   Recent Labs   Lab 06/17/20  0625   WBC 5.29   HGB 9.8*   HCT 31.8*       and CMP:   Recent Labs   Lab 06/17/20  0625      K 4.0   *   CO2 22*   GLU 93   BUN 10   CREATININE 1.7*   CALCIUM 8.6*   PROT 6.7   ALBUMIN 2.7*   BILITOT 0.4   ALKPHOS 148*   AST 12   ALT 11   ANIONGAP 6*    EGFRNONAA 40*       Diagnostic Results:  I have reviewed all pertinent imaging results/findings within the past 24 hours.

## 2020-06-18 ENCOUNTER — PATIENT OUTREACH (OUTPATIENT)
Dept: ADMINISTRATIVE | Facility: CLINIC | Age: 70
End: 2020-06-18

## 2020-06-18 NOTE — PATIENT INSTRUCTIONS
Discharge Instructions for Colostomy  You just underwent a procedure that required a colostomy. This is a life-saving procedure that involves removing or disconnecting part of your colon (large intestine). If your large intestine was diseased, your healthcare provider may have removed it. If it was injured, your healthcare provider may have disconnected it for a short time so that it can heal. After it heals, your healthcare provider may reconnect it. During a colostomy formation, your healthcare provider reroutes your colon through your abdominal wall. Stool and mucus can then pass out of your body through this opening, called a stoma. The following are general guidelines for home care following a colostomy. Your healthcare provider will go over any information that is specific to your condition.  Home care  Suggestions for home care include the following:  · Take care of your stoma as directed. Your healthcare provider and ostomy nurse discussed how to do this with you before you left the hospital.  · Ask your healthcare provider or ostomy nurse for a patient education sheet about colostomy care before you leave the hospital. This will help remind you how to care for yourself.  · If a partner or significant other will be helping you recover, ask the medical team to educate that person on ostomy care as well.   · Dont lift anything heavier than 5 pounds until your healthcare provider says it is OK.  · Dont drive until after your first healthcare providers appointment after your surgery.  · If you ride in a car for more than short trips, stop often to stretch your legs.  · Ask your healthcare provider when you can expect to return to work. Most patients are able to return to work within 4 to 6 weeks after surgery.  · Increase your activity gradually. Take short walks on a level surface.  · Wash your incision site with soap and water and pat it dry.  · Check your incision every day for redness, drainage,  swelling, or separation of the skin.  · Take your medicines exactly as directed. Dont skip doses.  · Dont take any over-the-counter medicine unless your healthcare provider tells you to do so.  Call your healthcare provider  Call your healthcare provider immediately if you have any of the following:  · Excessive bleeding from your stoma  · Blood in your stool  · Stool that is very hard  · No gas or stool  · Change in the color of your stoma  · Bulging skin around your stoma  · A stoma that looks like its getting longer  · Fever of 100.4°F (38°C) or higher, or chills, or as advised by your healthcare provider   · Redness, swelling, bleeding, or drainage from your incision  · Constipation  · Diarrhea  · Nausea or vomiting  · Increased pain in the belly or around the stoma   Date Last Reviewed: 7/1/2016  © 7485-0893 [employer]. [address] [place] [address], [place] [address]. All rights reserved. This information is not intended as a substitute for professional medical care. Always follow your healthcare professional's instructions.

## 2020-06-22 ENCOUNTER — CARE AT HOME (OUTPATIENT)
Dept: HOME HEALTH SERVICES | Facility: CLINIC | Age: 70
End: 2020-06-22
Payer: MEDICARE

## 2020-06-22 VITALS
RESPIRATION RATE: 18 BRPM | DIASTOLIC BLOOD PRESSURE: 78 MMHG | SYSTOLIC BLOOD PRESSURE: 148 MMHG | HEART RATE: 68 BPM | TEMPERATURE: 98 F | OXYGEN SATURATION: 97 %

## 2020-06-22 DIAGNOSIS — Z09 FOLLOW UP: Primary | ICD-10-CM

## 2020-06-22 PROCEDURE — 1159F MED LIST DOCD IN RCRD: CPT | Mod: S$GLB,,, | Performed by: NURSE PRACTITIONER

## 2020-06-22 PROCEDURE — 3078F DIAST BP <80 MM HG: CPT | Mod: CPTII,S$GLB,, | Performed by: NURSE PRACTITIONER

## 2020-06-22 PROCEDURE — 3077F SYST BP >= 140 MM HG: CPT | Mod: CPTII,S$GLB,, | Performed by: NURSE PRACTITIONER

## 2020-06-22 PROCEDURE — 1159F PR MEDICATION LIST DOCUMENTED IN MEDICAL RECORD: ICD-10-PCS | Mod: S$GLB,,, | Performed by: NURSE PRACTITIONER

## 2020-06-22 PROCEDURE — 99349 PR HOME VISIT,ESTAB PATIENT,LEVEL III: ICD-10-PCS | Mod: S$GLB,,, | Performed by: NURSE PRACTITIONER

## 2020-06-22 PROCEDURE — 1101F PR PT FALLS ASSESS DOC 0-1 FALLS W/OUT INJ PAST YR: ICD-10-PCS | Mod: CPTII,S$GLB,, | Performed by: NURSE PRACTITIONER

## 2020-06-22 PROCEDURE — 99499 RISK ADDL DX/OHS AUDIT: ICD-10-PCS | Mod: S$GLB,,, | Performed by: NURSE PRACTITIONER

## 2020-06-22 PROCEDURE — 3077F PR MOST RECENT SYSTOLIC BLOOD PRESSURE >= 140 MM HG: ICD-10-PCS | Mod: CPTII,S$GLB,, | Performed by: NURSE PRACTITIONER

## 2020-06-22 PROCEDURE — 99349 HOME/RES VST EST MOD MDM 40: CPT | Mod: S$GLB,,, | Performed by: NURSE PRACTITIONER

## 2020-06-22 PROCEDURE — 1101F PT FALLS ASSESS-DOCD LE1/YR: CPT | Mod: CPTII,S$GLB,, | Performed by: NURSE PRACTITIONER

## 2020-06-22 PROCEDURE — 1125F AMNT PAIN NOTED PAIN PRSNT: CPT | Mod: S$GLB,,, | Performed by: NURSE PRACTITIONER

## 2020-06-22 PROCEDURE — 99499 UNLISTED E&M SERVICE: CPT | Mod: S$GLB,,, | Performed by: NURSE PRACTITIONER

## 2020-06-22 PROCEDURE — 3078F PR MOST RECENT DIASTOLIC BLOOD PRESSURE < 80 MM HG: ICD-10-PCS | Mod: CPTII,S$GLB,, | Performed by: NURSE PRACTITIONER

## 2020-06-22 PROCEDURE — 1125F PR PAIN SEVERITY QUANTIFIED, PAIN PRESENT: ICD-10-PCS | Mod: S$GLB,,, | Performed by: NURSE PRACTITIONER

## 2020-06-22 RX ORDER — ATORVASTATIN CALCIUM 40 MG/1
40 TABLET, FILM COATED ORAL DAILY
Qty: 90 TABLET | Refills: 3
Start: 2020-06-22 | End: 2020-07-09 | Stop reason: SDUPTHER

## 2020-06-22 RX ORDER — ISOSORBIDE MONONITRATE 120 MG/1
120 TABLET, EXTENDED RELEASE ORAL DAILY
Qty: 90 TABLET | Refills: 3 | Status: SHIPPED | OUTPATIENT
Start: 2020-06-22 | End: 2021-01-01

## 2020-06-24 ENCOUNTER — NURSE TRIAGE (OUTPATIENT)
Dept: ADMINISTRATIVE | Facility: CLINIC | Age: 70
End: 2020-06-24

## 2020-06-24 NOTE — TELEPHONE ENCOUNTER
No contact made with pt through the Post Procedural Symptom Tracker for Day 13.  Pt had a home care visit on 6/22/2020.  No reports or c/o fever, cough or sob noted.  No additional action needed at this time per protocol    Reason for Disposition   Caller has already spoken with another triager and has no further questions.    Protocols used: NO CONTACT OR DUPLICATE CONTACT CALL-A-AH

## 2020-06-26 ENCOUNTER — EXTERNAL HOME HEALTH (OUTPATIENT)
Dept: HOME HEALTH SERVICES | Facility: HOSPITAL | Age: 70
End: 2020-06-26
Payer: MEDICARE

## 2020-06-27 NOTE — PROGRESS NOTES
[facility]  Medical Home Visit    Visit Date: 6/27/2020  Encounter Provider: Porter Salinas NP  PCP:  Norma Nuñez MD    Subjective:      Patient ID: Kodi Ribeiro is a 70 y.o. male.    Consult Requested By:  Porter Salinas  Reason for Consult: Medical Follow-Up and Medication Review    The patient is being seen at home due to physical debility that presents a taxing effort to leave the home, to mitigate high risk of hospital readmission or due to the limited availability of reliable or safe options for transportation to the point of access to the provider. Prior to treatment on this visit the chart was reviewed and patient consent was obtained.    Chief Complaint: Follow-up for chronic medical conditions and medication revie    Today:  Mr. Kodi Ribeiro is a 70 y.o. male is being seen today for follow-up for chronic medical conditions and medication review. Kodi presents at baseline state of health as reported by patient and caregiver. VSS. He has recently been discharged from the hospital for a partial colectomy and colostomy . Soft, light brown stool noted in collection bag. Bowel sounds + above pouch. Patient denies complications related to post-op plan of care. Pain is adequately controlled. Denies any acute issues, concerns or complaints to address on today's visit. Reports taking all medications as prescribed. No other needs identified at this time.  Refills on medications called in.     ROS:   Review of Systems   Constitutional: Negative for chills and fever.   HENT: Negative.    Eyes: Negative.    Respiratory: Negative.  Negative for cough and shortness of breath.    Cardiovascular: Negative for chest pain.   Gastrointestinal: Negative.  Negative for abdominal distention, abdominal pain, constipation, diarrhea and nausea.        Stool to colostomy pouch of normal consistency and color since discharge   Endocrine: Negative.    Genitourinary: Negative.    Musculoskeletal:  Positive for gait problem.        Amputee   Skin: Negative.         Kendall-colostomy skin intact,clean and dry   Allergic/Immunologic: Negative.    Hematological: Negative.    Psychiatric/Behavioral: Negative.    All other systems reviewed and are negative.    Assessments:  · Environmental: single story home, no steps to enter, adequate lighting and temeprature control  · Functional Status: Independent with ADL's/IADL's, ambulates with assistance of a cane/walker, continent of bowel and bladder  · Safety: Fall Precautions, Oxygen Use PRN  · Nutritional: Adequate  · Home Health: [facility]   · DME/Supplies: wheelchair, rollator walker, shower bench, oxygen tanks/concentrator     Objective:     Vitals:    06/22/20 1225   BP: (!) 148/78   Pulse: 68   Resp: 18   Temp: 97.8 °F (36.6 °C)   TempSrc: Temporal   SpO2: 97%   PainSc:   2     There is no height or weight on file to calculate BMI.    Physical Exam  Vitals signs reviewed.   Constitutional:       Appearance: He is well-developed.   HENT:      Head: Normocephalic and atraumatic.   Eyes:      Pupils: Pupils are equal, round, and reactive to light.   Neck:      Musculoskeletal: Normal range of motion and neck supple.      Trachea: No tracheal deviation.   Cardiovascular:      Rate and Rhythm: Normal rate and regular rhythm.      Heart sounds: Murmur present.   Pulmonary:      Effort: Pulmonary effort is normal. No respiratory distress.      Breath sounds: Normal breath sounds.   Abdominal:      General: Abdomen is flat. Bowel sounds are normal.      Palpations: Abdomen is soft.      Tenderness: There is abdominal tenderness (kendall stomal) in the right lower quadrant. There is guarding.          Comments: Bowel sounds wnl above colostomy pouch  Soft, light brown stool noted to bag   Musculoskeletal: Normal range of motion.      Comments: Bilateral amputee   Lymphadenopathy:      Cervical: No cervical adenopathy.   Skin:     General: Skin is warm and dry.      Capillary  Refill: Capillary refill takes less than 2 seconds.   Neurological:      Mental Status: He is alert and oriented to person, place, and time.   Psychiatric:         Behavior: Behavior normal.         Judgment: Judgment normal.       Assessment:     Medical Follow-Up and Medication Review    Plan:     Ethical / Legal: Advance Care Planning   Capacity to make medical decisions:  yes, Conflict no  · Surrogate decision maker:  Kenton Ross, Relationship: wife  · Advance Directives:  none  · HCPOA: none  · [place]:  none  · Code Status:  Full    Advanced Care Directives, HCPOA and [place] forms left in the home for family review, discussion and signing with instructions to return upon their next provider encounter for inclusion to the medical record.     Kodi was seen today for follow-up.  Diagnoses and all orders for this visit:     Encounter for Medical Follow-Up and Medication Review   - [facility] Nurse Practitioner to schedule home visit with patient in one month or PRN    Were controlled substances prescribed?  No    Follow Up Appointments:   Future Appointments   Date Time Provider Department Center   7/6/2020 10:00 AM Leonardo Yang MD [facility] [id number] [facility]   7/21/2020  3:00 PM Porter Salinas, NP [facility] [id number] [facility]   7/23/2020  1:20 PM Lydia Rodriguez MD [facility] HEM ONC [facility]   8/4/2020  9:50 AM LABORATORY, [facility]   8/4/2020 10:00 AM SPECIMEN, [facility]   8/11/2020  1:30 PM Brenton Jacobson MD ON [facility]     Signature:    Porter Salinas, MSN, APRN, FNP-C  [facility]    Total face-to-face time was 40 min, >50% of this was spent on counseling and coordination of care. The following issues were discussed: primary and secondary diagnoses, co-morbidities, prescribed medications, treatment modalities, importance of compliance with medical advice and directives for follow-up care.    Attestation: Screening criteria to assess the  level of the patient's risk for infection with COVID-19 as recommended by the CDC at the time of the above documented home visit concluded appropriateness to proceed. Universal precautions were maintained at all times, including provider use of >60% alcohol gel hand  immediately prior to entry and upon departing the patient's home as well as cleaning of equipment used in home visit with antibacterial/germicidal disposable wipes.

## 2020-06-27 NOTE — PATIENT INSTRUCTIONS
- [facility] Nurse Practitioner to schedule home follow-up visit with patient in one month or as needed.  - Continue all medications, treatments and therapies as ordered.   - Follow all instructions, recommendations as discussed.  - Maintain Safety Precautions at all times.  - Attend all medical appointments as scheduled.  - For worsening symptoms: call Primary Care Physician or Nurse Practitioner.  - For emergencies, call 911 or immediately report to the nearest emergency room.  - Limit Risks of environmental exposure to coronavirus as discussed including: social distancing, hand hygiene, and limiting departures from the home for necessities only.     Follow up with surgery as scheduled.

## 2020-07-06 ENCOUNTER — OFFICE VISIT (OUTPATIENT)
Dept: SURGERY | Facility: CLINIC | Age: 70
End: 2020-07-06
Payer: MEDICARE

## 2020-07-06 VITALS
WEIGHT: 162.5 LBS | TEMPERATURE: 99 F | BODY MASS INDEX: 22.04 KG/M2 | HEART RATE: 74 BPM | DIASTOLIC BLOOD PRESSURE: 67 MMHG | SYSTOLIC BLOOD PRESSURE: 123 MMHG

## 2020-07-06 DIAGNOSIS — C18.9 COLON ADENOCARCINOMA: Primary | ICD-10-CM

## 2020-07-06 PROCEDURE — 99999 PR PBB SHADOW E&M-EST. PATIENT-LVL IV: ICD-10-PCS | Mod: PBBFAC,HCNC,, | Performed by: COLON & RECTAL SURGERY

## 2020-07-06 PROCEDURE — 99024 POSTOP FOLLOW-UP VISIT: CPT | Mod: HCNC,S$GLB,, | Performed by: COLON & RECTAL SURGERY

## 2020-07-06 PROCEDURE — 99024 PR POST-OP FOLLOW-UP VISIT: ICD-10-PCS | Mod: HCNC,S$GLB,, | Performed by: COLON & RECTAL SURGERY

## 2020-07-06 PROCEDURE — 99999 PR PBB SHADOW E&M-EST. PATIENT-LVL IV: CPT | Mod: PBBFAC,HCNC,, | Performed by: COLON & RECTAL SURGERY

## 2020-07-06 NOTE — PROGRESS NOTES
History & Physical    SUBJECTIVE:     CC: colon adenocarcinoma  Ref MD: Rosanna Harrington MD    History of Present Illness:  Patient is a 70 y.o. male presents for evaluation colonic adenocarcinoma. Pt has a PMHx significant for anemia, GERD, HTN, HLD, CKD, CHF, CVA, CAD and PVD with previous AAA repair who recently underwent a colonoscopy on 06/02/2020 where a malignant-appearing mass was seen in the transverse colon, possible descending colon and biopsied.  Biopsies did confirm this to be adenocarcinoma.  Patient states that he had been having both hematochezia and melena since January intermittently.  Patient states that since colonoscopy and being off Plavix, he has not had any further bleeding or melanotic stools.  Patient's last colonoscopy prior to this 1 was in February 2014.  Patient has significant past surgical history including multiple previous abdominal operations including AAA repair, AAA graft excision and right ax fem bypass with small-bowel resection.  He denies current fever, chills, nausea, vomiting.  Patient reports a significant past coronary history requiring stent placement and likely need for further stent/CABG but is unable to undergo at this time due to high risk nature.    6/11/2020: Open partial colectomy (transverse) with end proximal transverse colostomy construction, extensive lysis of adhesions    Interval history:  Since last clinic visit, the patient underwent open partial/transverse colectomy with end proximal transverse colostomy and extensive lysis of adhesion on 06/11/2020.  This was due to the fact that he required admission to the hospital again for recurrent bleeding and anemia.  He did well postoperatively and was discharged home.  Since discharge, he is tolerating regular diet and has had good ostomy function.  He has restarted his anticoagulation and is doing very well without bleeding from his rectum or from his ostomy.  He denies any fever, chills, nausea,  vomiting.      Review of patient's allergies indicates:   Allergen Reactions    Morphine Itching       Current Outpatient Medications   Medication Sig Dispense Refill    albuterol-ipratropium (DUO-NEB) 2.5 mg-0.5 mg/3 mL nebulizer solution Take 3 mLs by nebulization every 8 (eight) hours as needed for Wheezing or Shortness of Breath. Rescue 90 mL 0    amLODIPine (NORVASC) 10 MG tablet TAKE 1 TABLET EVERY DAY 90 tablet 3    atorvastatin (LIPITOR) 40 MG tablet Take 1 tablet (40 mg total) by mouth once daily. 90 tablet 3    carvedilol (COREG) 25 MG tablet Take 1 tablet (25 mg total) by mouth 2 (two) times daily with meals. 60 tablet 11    cetirizine (ZYRTEC) 10 MG tablet Take 10 mg by mouth once daily.      folic acid-vit B6-vit B12 2.5-25-2 mg (FOLBIC OR EQUIV) 2.5-25-2 mg Tab Take 1 tablet by mouth once daily. 90 tablet 3    furosemide (LASIX) 20 MG tablet Take two tab on Mon/Wed/Friday and one tab on Tues/Thurs/ Sat/Sunday 124 tablet 3    HYDROcodone-acetaminophen (NORCO) 7.5-325 mg per tablet Take 1 tablet by mouth every 8 (eight) hours as needed for Pain. 20 tablet 0    isosorbide mononitrate (IMDUR) 120 MG 24 hr tablet Take 1 tablet (120 mg total) by mouth once daily. 90 tablet 3    mupirocin (BACTROBAN) 2 % ointment Apply topically 3 (three) times daily. 1 Tube 2    nitroglycerin (NITROSTAT) 0.6 MG Subl Place 1 tablet (0.6 mg total) under the tongue every 5 (five) minutes as needed (max 3/ per episode). 30 tablet 1    omeprazole (PRILOSEC) 20 MG capsule TAKE 1 CAPSULE TWICE DAILY 180 capsule 3    OXYGEN-AIR DELIVERY SYSTEMS MISC [place] [facility] [place]Non-Drug; Combo Route) route.      sertraline (ZOLOFT) 50 MG tablet Take 1 tablet (50 mg total) by mouth once daily. 90 tablet 11    triamcinolone acetonide 0.1% (KENALOG) 0.1 % cream Apply topically 2 (two) times daily. 1 Tube 3    aspirin (ECOTRIN) 81 MG EC tablet Take 1 tablet (81 mg total) by mouth once daily.  0    hydrALAZINE (APRESOLINE) 25 MG tablet  Take 1 tablet (25 mg total) by mouth every 12 (twelve) hours. 180 tablet 3     No current facility-administered medications for this visit.        Past Medical History:   Diagnosis Date    Acute on chronic congestive heart failure 1/13/2020    Acute respiratory failure with hypoxia 1/14/2020    Analgesic nephropathy     Anemia     AP (angina pectoris) 1/11/2019    Arthritis     Colon polyp     Repeat colonoscopy due in 9/14    COPD exacerbation 2/6/2020    Coronary artery disease     Diverticulosis     colonoscopy 2/21/2014    Dysthymia 2/13/2020    Encounter for blood transfusion     GERD (gastroesophageal reflux disease)     Hemorrhoids     colonoscopy 2/21/2014    Horseshoe kidney     Hyperglycemia 3/17/2014    Hyperlipidemia     Hypertension     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    Late complications of amputation stump     rseolved with further amputation( MRSA then none since 2014)    Lipoma of colon     colonoscopy 2/21/2014    Myocardial infarction     per patient 2000 & 9/2012    Peripheral vascular disease     Phantom limb syndrome     patient reports only intermittent not problematic, not worsening    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    Spinal cord disease     L4L5 disc    Stroke     Tobacco dependence     resolved    Ureteral stent retained      Past Surgical History:   Procedure Laterality Date    ABDOMINAL AORTIC ANEURYSM REPAIR      ABDOMINAL AORTIC ANEURYSM REPAIR  1996/2014    AMPUTATION, LOWER LIMB      AORTA - BILATERAL FEMORAL ARTERY BYPASS GRAFT  2014    Left and right leg    COLONOSCOPY N/A 6/2/2020    Procedure: COLONOSCOPY;  Surgeon: Rosanna Harrington MD;  Location: [facility] ENDO;  Service: Endoscopy;  Laterality: N/A;    COLOSTOMY N/A 6/11/2020    Procedure: CREATION, COLOSTOMY;  Surgeon: Leonardo Yang MD;  Location: [facility] OR;  Service: General;  Laterality: N/A;    CORONARY ANGIOPLASTY WITH STENT PLACEMENT  2000    Three placed in heart     CYSTOSCOPY W/ RETROGRADES Left 5/29/2018    Procedure: CYSTOSCOPY, WITH RETROGRADE PYELOGRAM;  Surgeon: Scooter Jin IV, MD;  Location: [facility] OR;  Service: Urology;  Laterality: Left;    CYSTOSCOPY W/ URETERAL STENT PLACEMENT Left 5/29/2018    Procedure: CYSTOSCOPY, WITH URETERAL STENT INSERTION;  Surgeon: Scooter Jin IV, MD;  Location: [facility] OR;  Service: Urology;  Laterality: Left;    CYSTOSCOPY W/ URETERAL STENT PLACEMENT Left 2/4/2020    Procedure: CYSTOSCOPY, WITH URETERAL STENT INSERTION;  Surgeon: Scooter Jin IV, MD;  Location: [facility] OR;  Service: Urology;  Laterality: Left;    CYSTOSCOPY W/ URETERAL STENT REMOVAL Left 5/29/2018    Procedure: CYSTOSCOPY, WITH URETERAL STENT REMOVAL;  Surgeon: Scooter Jin IV, MD;  Location: [facility];  Service: Urology;  Laterality: Left;    CYSTOSCOPY W/ URETERAL STENT REMOVAL Left 2/4/2020    Procedure: CYSTOSCOPY, WITH URETERAL STENT REMOVAL;  Surgeon: Scooter Jin IV, MD;  Location: [facility] OR;  Service: Urology;  Laterality: Left;    ESOPHAGOGASTRODUODENOSCOPY N/A 6/2/2020    Procedure: EGD (ESOPHAGOGASTRODUODENOSCOPY);  Surgeon: Rosanna Harrington MD;  Location: [facility] ENDO;  Service: Endoscopy;  Laterality: N/A;    FOOT AMPUTATION THROUGH METATARSAL  1996    left    FOOT SURGERY Bilateral 1980's    per patient multiple toe amputations prior to.  partial foot amputation:first great toe then other toes     INJECTION OF ANESTHETIC AGENT INTO TISSUE PLANE DEFINED BY TRANSVERSUS ABDOMINIS MUSCLE N/A 6/11/2020    Procedure: BLOCK, TRANSVERSUS ABDOMINIS PLANE;  Surgeon: Leonardo Yang MD;  Location: [facility] OR;  Service: General;  Laterality: N/A;    KIDNEY SURGERY  2014    per patient separation of horseshoe kidney @ time of AAA repair    LEFT HEART CATHETERIZATION Left 3/7/2019    Procedure: CATHETERIZATION, HEART, LEFT;  Surgeon: Adriel Boone MD;  Location: [facility] CATH LAB;  Service: Cardiology;  Laterality: Left;  630 admit for IV  hydration  10am start    LUNG LOBECTOMY Right     per patient not cancer    LYSIS OF ADHESIONS N/A 2020    Procedure: LYSIS, ADHESIONS;  Surgeon: Leonardo Yang MD;  Location: [facility] OR;  Service: General;  Laterality: N/A;    OMENTECTOMY N/A 2020    Procedure: OMENTECTOMY;  Surgeon: Leonardo Yang MD;  Location: [facility] OR;  Service: General;  Laterality: N/A;    right below knee amputation   (approx)    SMALL INTESTINE SURGERY      per patient partial @ time of aaa repair  not small bowel - large bowel bowel compromised bythtwe AAAbowel    SUBTOTAL COLECTOMY N/A 2020    Procedure: COLECTOMY, PARTIAL;  Surgeon: Leonardo Yang MD;  Location: [facility] OR;  Service: General;  Laterality: N/A;    TONSILLECTOMY   aprox    URETERAL STENT PLACEMENT Left     annually replaced since  or so  Dr Jin     Family History   Problem Relation Age of Onset    Cancer Mother         lung    COPD Mother     Heart disease Father         MI but per patient bc of old age    Diabetes Daughter     Eczema Neg Hx     Lupus Neg Hx     Psoriasis Neg Hx     Melanoma Neg Hx     Kidney disease Neg Hx     Stroke Neg Hx     Mental illness Neg Hx     Mental retardation Neg Hx     Hypertension Neg Hx     Hyperlipidemia Neg Hx     Drug abuse Neg Hx     Alcohol abuse Neg Hx     Depression Neg Hx      Social History     Tobacco Use    Smoking status: Former Smoker     Packs/day: 1.00     Years: 15.00     Pack years: 15.00     Quit date: 2009     Years since quittin.5    Smokeless tobacco: Never Used   Substance Use Topics    Alcohol use: No    Drug use: No     Comment: Is on prescription opiod, no non prescribed use        Review of Systems:  Review of Systems   Constitutional: Negative for activity change, appetite change, chills, fatigue, fever and unexpected weight change.   HENT: Negative for congestion, ear pain, sore throat and trouble swallowing.    Eyes: Negative  for pain, redness and itching.   Respiratory: Negative for cough, shortness of breath and wheezing.    Cardiovascular: Negative for chest pain, palpitations and leg swelling.   Gastrointestinal: Negative for abdominal distention, abdominal pain, anal bleeding, blood in stool, constipation, diarrhea, nausea, rectal pain and vomiting.   Endocrine: Negative for cold intolerance, heat intolerance and polyuria.   Genitourinary: Negative for dysuria, flank pain, frequency and hematuria.   Musculoskeletal: Negative for gait problem, joint swelling and neck pain.   Skin: Negative for color change, rash and wound.   Allergic/Immunologic: Negative for environmental allergies and immunocompromised state.   Neurological: Negative for dizziness, speech difficulty, weakness and numbness.   Psychiatric/Behavioral: Negative for agitation, confusion and hallucinations.       OBJECTIVE:     Vital Signs (Most Recent)  Temp: 98.6 °F (37 °C) (07/06/20 0954)  Pulse: 74 (07/06/20 0954)  BP: 123/67 (07/06/20 0954)     73.7 kg (162 lb 7.7 oz)     Physical Exam:  Physical Exam  Constitutional:       Appearance: He is well-developed.   HENT:      Head: Normocephalic and atraumatic.   Eyes:      Conjunctiva/sclera: Conjunctivae normal.   Neck:      Musculoskeletal: Normal range of motion.      Thyroid: No thyromegaly.   Cardiovascular:      Rate and Rhythm: Normal rate and regular rhythm.   Pulmonary:      Effort: Pulmonary effort is normal. No respiratory distress.   Abdominal:      Comments: Soft, nontender, nondistended, well-healing midline incision with staples in place (removed at bedside today); ostomy pink and viable with semisolid stool in bag   Musculoskeletal: Normal range of motion.         General: No tenderness.   Skin:     General: Skin is warm and dry.      Capillary Refill: Capillary refill takes less than 2 seconds.      Findings: No rash.   Neurological:      Mental Status: He is alert and oriented to person, place, and  time.         Laboratory  Lab Results   Component Value Date    WBC 5.29 06/17/2020    HGB 9.8 (L) 06/17/2020    HCT 31.8 (L) 06/17/2020     06/17/2020    CHOL 117 (L) 04/15/2020    TRIG 57 04/15/2020    HDL 55 04/15/2020    ALT 11 06/17/2020    AST 12 06/17/2020     06/17/2020    K 4.0 06/17/2020     (H) 06/17/2020    CREATININE 1.7 (H) 06/17/2020    BUN 10 06/17/2020    CO2 22 (L) 06/17/2020    TSH 1.747 03/27/2019    PSA 0.72 05/17/2018    INR 1.0 06/14/2020    HGBA1C 4.8 04/15/2020         Diagnostic Results:  CT: Reviewed  CT:  FINDINGS:  Chest: Moderate emphysematous changes in the lung apices.  Calcified granulomas in the right upper lobe and left lower lobe.  There are calcifications or postoperative changes along the superior major fissure on the right.  Stable peripheral reticulonodular interstitial changes in the lateral and posterior right lower lobe with some minimal ground-glass opacity.  This could be related to elevation of the right hemidiaphragm.  There is a superior middle lobe 6 mm nodule on image 174 of series 4.  There is a 7 mm nodule within the posterior left upper lobe on image 106 of series 4.  There is an 8 mm nodule within the left upper lobe on image 148 of series 4.  Negative for pleural or pericardial effusions.  Calcified mediastinal lymph nodes.  Negative for abnormal lymph nodes by size criteria.  Thoracic aorta, coronary artery and great vessel atherosclerotic calcifications.    The airways are patent.  The thyroid gland is normal.  The esophagus is normal.    Abdomen:   The unenhanced liver, spleen and gallbladder appear normal.  The pancreas is unremarkable.  The adrenal glands are normal.    Again seen is fusion of the inferior poles of the bilateral kidneys.  Stable atrophy of the left kidney with multiple parapelvic cysts versus chronic left hydronephrosis in this patient who has a left ureteral stent in place.  Mild prominence of the right renal collecting  system again seen.  Negative for right hydronephrosis or discrete renal lesions otherwise.    Negative for adenopathy free fluid or inflammatory change noted within the abdomen or pelvis.    Vascular calcifications are present without aneurysmal change.  There is a right axillofemoral bypass as well as a femoral-femoral bypass present.  I cannot determine if it is patent due to the lack of IV contrast.    No obvious colonic mass is seen on this study.  Scattered colonic diverticula with mildly increased stool.  There is a area of nodularity within the 2nd portion of duodenum with the waddle diverticula present.  Small bowel is otherwise normal.  Of note, the oral contrast did not made it to the colon at the time of imaging.  Linear radiodensity is present within the cecum, which appear to be related to clips placed during endoscopy.  There is some mild thickening of the cecal wall.    Pelvis:  The urinary bladder is normal, considering a left ureteral stent is in place.  The male pelvic organs are normal.    No significant osseous abnormality is identified.  Stable anterior wedging of the L1 vertebral body.  Stable multilevel Schmorl node formation and degenerative facet arthropathy.  Fusion of the L3 through L5 vertebral bodies again seen.    Negative for groin adenopathy.      Impression       1.  There are clips within the cecum, with wall thickening present.  This possibly corresponds to the history given of colon neoplasm.  Clinical correlation is advised.    2.  There are 3 noncalcified pulmonary nodules, measuring between 6 and 8 mm in the middle lobe and left upper lobe.  Considering the patient's history, early metastasis must be considered.    3.  Negative for acute process within the chest, abdomen and pelvis.    4.  Numerous stable and nonemergent findings as described above.         PATHOLOGY:  1. Transverse colon, segmental resection:  - Invasive moderately differentiated colonic  adenocarcinoma    Synoptic report:  Procedure: Transverse colectomy  Tumor Site: Transverse colon  Tumor size: 4.3 cm  Macroscopic tumor perforation: Not identified  Histologic type: Adenocarcinoma  Histologic grade: G2 - moderately differentiated  Tumor extension: Tumor invades through the muscularis propria into the pericolorectal tissue  Margins: All margins are uninvolved by invasive carcinoma  Margins examined: Proximal, distal and mesenteric  Distance of carcinoma from closest margin: 2.0 cm from non-oriented peripheral margin  Treatment effect: No known presurgical therapy  Lymph-vascular invasion: Not identified  Perineural invasion: Not identified  Tumor deposits: Not identified  Number of lymph nodes involved: 0 of 17  AJCC TNM descriptors: pT3N0      ASSESSMENT/PLAN:     69yo M with multiple medical comorbidities who presents with transverse vs descending colonic adenocarcinoma with possible metastatic lung disease who is now s/p open transverse colectomy with end transverse colostomy on 6/11/2020 with final path showing T3N0 disease    - Cont reg diet  - Staples removed at beside today  - Will have him discuss adjuvant therapy with HemeOnc, as well as possible surveillance of lung nodules  - RTC 3 months    Leonardo Yang MD  Colon and Rectal Surgery  [facility]

## 2020-07-09 RX ORDER — ATORVASTATIN CALCIUM 40 MG/1
40 TABLET, FILM COATED ORAL DAILY
Qty: 90 TABLET | Refills: 3
Start: 2020-07-09 | End: 2020-09-10 | Stop reason: SDUPTHER

## 2020-07-21 ENCOUNTER — CARE AT HOME (OUTPATIENT)
Dept: HOME HEALTH SERVICES | Facility: CLINIC | Age: 70
End: 2020-07-21
Payer: MEDICARE

## 2020-07-21 VITALS
OXYGEN SATURATION: 96 % | HEART RATE: 71 BPM | RESPIRATION RATE: 16 BRPM | DIASTOLIC BLOOD PRESSURE: 72 MMHG | SYSTOLIC BLOOD PRESSURE: 148 MMHG | TEMPERATURE: 98 F

## 2020-07-21 DIAGNOSIS — Z09 FOLLOW UP: ICD-10-CM

## 2020-07-21 PROCEDURE — 1101F PR PT FALLS ASSESS DOC 0-1 FALLS W/OUT INJ PAST YR: ICD-10-PCS | Mod: CPTII,S$GLB,, | Performed by: NURSE PRACTITIONER

## 2020-07-21 PROCEDURE — 3077F PR MOST RECENT SYSTOLIC BLOOD PRESSURE >= 140 MM HG: ICD-10-PCS | Mod: CPTII,S$GLB,, | Performed by: NURSE PRACTITIONER

## 2020-07-21 PROCEDURE — 99349 PR HOME VISIT,ESTAB PATIENT,LEVEL III: ICD-10-PCS | Mod: S$GLB,,, | Performed by: NURSE PRACTITIONER

## 2020-07-21 PROCEDURE — 1126F AMNT PAIN NOTED NONE PRSNT: CPT | Mod: S$GLB,,, | Performed by: NURSE PRACTITIONER

## 2020-07-21 PROCEDURE — 1101F PT FALLS ASSESS-DOCD LE1/YR: CPT | Mod: CPTII,S$GLB,, | Performed by: NURSE PRACTITIONER

## 2020-07-21 PROCEDURE — 1159F PR MEDICATION LIST DOCUMENTED IN MEDICAL RECORD: ICD-10-PCS | Mod: S$GLB,,, | Performed by: NURSE PRACTITIONER

## 2020-07-21 PROCEDURE — 1159F MED LIST DOCD IN RCRD: CPT | Mod: S$GLB,,, | Performed by: NURSE PRACTITIONER

## 2020-07-21 PROCEDURE — 99349 HOME/RES VST EST MOD MDM 40: CPT | Mod: S$GLB,,, | Performed by: NURSE PRACTITIONER

## 2020-07-21 PROCEDURE — 3077F SYST BP >= 140 MM HG: CPT | Mod: CPTII,S$GLB,, | Performed by: NURSE PRACTITIONER

## 2020-07-21 PROCEDURE — 3078F DIAST BP <80 MM HG: CPT | Mod: CPTII,S$GLB,, | Performed by: NURSE PRACTITIONER

## 2020-07-21 PROCEDURE — 3078F PR MOST RECENT DIASTOLIC BLOOD PRESSURE < 80 MM HG: ICD-10-PCS | Mod: CPTII,S$GLB,, | Performed by: NURSE PRACTITIONER

## 2020-07-21 PROCEDURE — 1126F PR PAIN SEVERITY QUANTIFIED, NO PAIN PRESENT: ICD-10-PCS | Mod: S$GLB,,, | Performed by: NURSE PRACTITIONER

## 2020-07-21 NOTE — PATIENT INSTRUCTIONS
- [facility] to schedule follow-up visit with patient in 4-6 weeks or as needed.  - Continue all medications, treatments and therapies as ordered.   - Follow all instructions, recommendations as discussed.  - Maintain Safety Precautions at all times.  - Attend all medical appointments as scheduled.  - For worsening symptoms: call Primary Care Physician or Nurse Practitioner.  - For emergencies, call 911 or immediately report to the nearest emergency room.  - Limit Risks of environmental exposure to coronavirus as discussed including: social distancing, hand hygiene, and limiting departures from the home for necessities only.

## 2020-07-21 NOTE — PROGRESS NOTES
[facility]  Medical Home Visit    Visit Date: 7/21/2020  Encounter Provider: Porter Salinas NP  PCP:  Norma Nuñez MD    Subjective:      Patient ID: Kodi Ribeiro is a 70 y.o. male.    Consult Requested By:  Porter Salinas  Reason for Consult: Medical Follow-Up and Medication Review    The patient is being seen at home due to physical debility that presents a taxing effort to leave the home, to mitigate high risk of hospital readmission or due to the limited availability of reliable or safe options for transportation to the point of access to the provider. Prior to treatment on this visit the chart was reviewed and patient consent was obtained.    Chief Complaint: Follow-up for chronic medical conditions and medication review    Today:  Mr. Kodi Ribeiro is a 70 y.o. male is being seen today for follow-up for chronic medical conditions and medication review. Kodi presents at baseline state of health as reported by patient and caregiver. VSS.     He has recently been discharged from the hospital for a partial colectomy and colostomy. Soft, light brown stool noted in collection bag. Bowel sounds + above pouch. Reports increase in flatulence via ostomy that requires frequent venting of his appliance. We discussed dietary modifications to decrease gas. Denies any acute issues, concerns or complaints to address on today's visit. Reports taking all medications as prescribed. No other needs identified at this time.     ROS:   Review of Systems   Constitutional: Negative for chills and fever.   HENT: Negative.    Eyes: Negative.    Respiratory: Negative.  Negative for cough and shortness of breath.    Cardiovascular: Negative for chest pain.   Gastrointestinal: Negative.  Negative for abdominal distention, abdominal pain, constipation, diarrhea and nausea.        Stool to colostomy pouch of normal consistency and color since discharge, increase in flatulence   Endocrine: Negative.     Genitourinary: Negative.    Musculoskeletal: Positive for gait problem.        Amputee   Skin: Negative.         Kendall-colostomy skin intact,clean and dry   Allergic/Immunologic: Negative.    Hematological: Negative.    Psychiatric/Behavioral: Negative.    All other systems reviewed and are negative.    Assessments:  · Environmental: single story home, no steps to enter, adequate lighting and temeprature control  · Functional Status: Independent with ADL's/IADL's, ambulates with assistance of a cane/walker, continent of bowel and bladder  · Safety: Fall Precautions, Oxygen Use PRN  · Nutritional: Adequate  · Home Health: [facility]   · DME/Supplies: wheelchair, rollator walker, shower bench, oxygen tanks/concentrator     Objective:     Vitals:    07/21/20 1155   BP: (!) 148/72   Pulse: 71   Resp: 16   Temp: 98.1 °F (36.7 °C)   TempSrc: Temporal   SpO2: 96%   PainSc: 0-No pain     There is no height or weight on file to calculate BMI.    Physical Exam  Vitals signs reviewed.   Constitutional:       Appearance: He is well-developed.   HENT:      Head: Normocephalic and atraumatic.   Eyes:      Pupils: Pupils are equal, round, and reactive to light.   Neck:      Musculoskeletal: Normal range of motion and neck supple.      Trachea: No tracheal deviation.   Cardiovascular:      Rate and Rhythm: Normal rate and regular rhythm.      Heart sounds: Murmur present.   Pulmonary:      Effort: Pulmonary effort is normal. No respiratory distress.      Breath sounds: Normal breath sounds.   Abdominal:      General: Abdomen is flat. Bowel sounds are normal.      Palpations: Abdomen is soft.      Tenderness: There is abdominal tenderness (kendall stomal) in the right lower quadrant. There is guarding.          Comments: Bowel sounds wnl above colostomy pouch  Soft, light brown stool noted to bag   Musculoskeletal: Normal range of motion.      Comments: Bilateral amputee   Lymphadenopathy:      Cervical: No cervical adenopathy.    Skin:     General: Skin is warm and dry.      Capillary Refill: Capillary refill takes less than 2 seconds.   Neurological:      Mental Status: He is alert and oriented to person, place, and time.   Psychiatric:         Behavior: Behavior normal.         Judgment: Judgment normal.       Assessment:     Medical Follow-Up and Medication Review    Plan:     Ethical / Legal: Advance Care Planning   Capacity to make medical decisions:  yes, Conflict no  · Surrogate decision maker:  Name Laury Ross, Relationship: wife  · Advance Directives:  none  · HCPOA: none  · [place]POST:  none  · Code Status:  Full    Advanced Care Directives, HCPOA and [place]Post forms left in the home for family review, discussion and signing with instructions to return upon their next provider encounter for inclusion to the medical record.   [place] form was signed by the patient and will be sent to medical records for inclusion into the medical record.  Kodi was seen today for follow-up.  Diagnoses and all orders for this visit:     Encounter for Medical Follow-Up and Medication Review   - [facility] Nurse Practitioner to schedule home visit with patient in 4-6 weeks or PRN    Were controlled substances prescribed?  No    Follow Up Appointments:   Future Appointments   Date Time Provider Department Center   7/21/2020  3:00 PM Porter Salinas, [facility] [id number]   7/23/2020  1:20 PM Lydia Rodriguez MD [facility] HEM ONC [facility]   8/4/2020  9:50 AM LABORATORY, [facility]   8/4/2020 10:00 AM SPECIMEN, [facility]CL[facility]   8/11/2020  1:30 PM Brenton Jacobson MD ON NEPHRO  Medical C   10/5/2020  9:00 AM Leonardo Yang MD [facility] CLRCSR [facility]     Signature:    Porter Salinas, MSN, APRN, FNP-C  [facility]    Total face-to-face time was 40 min, >50% of this was spent on counseling and coordination of care. The following issues were discussed: primary and secondary diagnoses, co-morbidities,  prescribed medications, treatment modalities, importance of compliance with medical advice and directives for follow-up care.    Attestation: Screening criteria to assess the level of the patient's risk for infection with COVID-19 as recommended by the CDC at the time of the above documented home visit concluded appropriateness to proceed. Universal precautions were maintained at all times, including provider use of >60% alcohol gel hand  immediately prior to entry and upon departing the patient's home as well as cleaning of equipment used in home visit with antibacterial/germicidal disposable wipes.

## 2020-07-23 ENCOUNTER — OFFICE VISIT (OUTPATIENT)
Dept: HEMATOLOGY/ONCOLOGY | Facility: CLINIC | Age: 70
End: 2020-07-23
Payer: MEDICARE

## 2020-07-23 VITALS
BODY MASS INDEX: 22.26 KG/M2 | RESPIRATION RATE: 16 BRPM | DIASTOLIC BLOOD PRESSURE: 67 MMHG | OXYGEN SATURATION: 98 % | HEART RATE: 72 BPM | WEIGHT: 168 LBS | SYSTOLIC BLOOD PRESSURE: 132 MMHG | HEIGHT: 73 IN | TEMPERATURE: 98 F

## 2020-07-23 DIAGNOSIS — I25.10 CORONARY ARTERY DISEASE INVOLVING NATIVE CORONARY ARTERY OF NATIVE HEART WITHOUT ANGINA PECTORIS: ICD-10-CM

## 2020-07-23 DIAGNOSIS — N18.31 CKD STAGE G3A/A2, GFR 45-59 AND ALBUMIN CREATININE RATIO 30-299 MG/G: ICD-10-CM

## 2020-07-23 DIAGNOSIS — C18.9 COLON ADENOCARCINOMA: Primary | ICD-10-CM

## 2020-07-23 DIAGNOSIS — J44.9 CHRONIC OBSTRUCTIVE PULMONARY DISEASE, UNSPECIFIED COPD TYPE: ICD-10-CM

## 2020-07-23 DIAGNOSIS — D50.0 IRON DEFICIENCY ANEMIA DUE TO CHRONIC BLOOD LOSS: ICD-10-CM

## 2020-07-23 DIAGNOSIS — C34.12 MALIGNANT NEOPLASM OF UPPER LOBE, LEFT BRONCHUS OR LUNG: ICD-10-CM

## 2020-07-23 PROCEDURE — 99499 UNLISTED E&M SERVICE: CPT | Mod: HCNC,S$GLB,, | Performed by: INTERNAL MEDICINE

## 2020-07-23 PROCEDURE — 99499 RISK ADDL DX/OHS AUDIT: ICD-10-PCS | Mod: HCNC,S$GLB,, | Performed by: INTERNAL MEDICINE

## 2020-07-23 PROCEDURE — 3078F DIAST BP <80 MM HG: CPT | Mod: HCNC,CPTII,S$GLB, | Performed by: INTERNAL MEDICINE

## 2020-07-23 PROCEDURE — 1126F PR PAIN SEVERITY QUANTIFIED, NO PAIN PRESENT: ICD-10-PCS | Mod: HCNC,S$GLB,, | Performed by: INTERNAL MEDICINE

## 2020-07-23 PROCEDURE — 3075F PR MOST RECENT SYSTOLIC BLOOD PRESS GE 130-139MM HG: ICD-10-PCS | Mod: HCNC,CPTII,S$GLB, | Performed by: INTERNAL MEDICINE

## 2020-07-23 PROCEDURE — 1159F PR MEDICATION LIST DOCUMENTED IN MEDICAL RECORD: ICD-10-PCS | Mod: HCNC,S$GLB,, | Performed by: INTERNAL MEDICINE

## 2020-07-23 PROCEDURE — 1159F MED LIST DOCD IN RCRD: CPT | Mod: HCNC,S$GLB,, | Performed by: INTERNAL MEDICINE

## 2020-07-23 PROCEDURE — 99215 OFFICE O/P EST HI 40 MIN: CPT | Mod: HCNC,S$GLB,, | Performed by: INTERNAL MEDICINE

## 2020-07-23 PROCEDURE — 1126F AMNT PAIN NOTED NONE PRSNT: CPT | Mod: HCNC,S$GLB,, | Performed by: INTERNAL MEDICINE

## 2020-07-23 PROCEDURE — 1101F PT FALLS ASSESS-DOCD LE1/YR: CPT | Mod: HCNC,CPTII,S$GLB, | Performed by: INTERNAL MEDICINE

## 2020-07-23 PROCEDURE — 3078F PR MOST RECENT DIASTOLIC BLOOD PRESSURE < 80 MM HG: ICD-10-PCS | Mod: HCNC,CPTII,S$GLB, | Performed by: INTERNAL MEDICINE

## 2020-07-23 PROCEDURE — 99999 PR PBB SHADOW E&M-EST. PATIENT-LVL V: ICD-10-PCS | Mod: PBBFAC,HCNC,, | Performed by: INTERNAL MEDICINE

## 2020-07-23 PROCEDURE — 1101F PR PT FALLS ASSESS DOC 0-1 FALLS W/OUT INJ PAST YR: ICD-10-PCS | Mod: HCNC,CPTII,S$GLB, | Performed by: INTERNAL MEDICINE

## 2020-07-23 PROCEDURE — 3008F BODY MASS INDEX DOCD: CPT | Mod: HCNC,CPTII,S$GLB, | Performed by: INTERNAL MEDICINE

## 2020-07-23 PROCEDURE — 99999 PR PBB SHADOW E&M-EST. PATIENT-LVL V: CPT | Mod: PBBFAC,HCNC,, | Performed by: INTERNAL MEDICINE

## 2020-07-23 PROCEDURE — 3008F PR BODY MASS INDEX (BMI) DOCUMENTED: ICD-10-PCS | Mod: HCNC,CPTII,S$GLB, | Performed by: INTERNAL MEDICINE

## 2020-07-23 PROCEDURE — 3075F SYST BP GE 130 - 139MM HG: CPT | Mod: HCNC,CPTII,S$GLB, | Performed by: INTERNAL MEDICINE

## 2020-07-23 PROCEDURE — 99215 PR OFFICE/OUTPT VISIT, EST, LEVL V, 40-54 MIN: ICD-10-PCS | Mod: HCNC,S$GLB,, | Performed by: INTERNAL MEDICINE

## 2020-07-23 NOTE — Clinical Note
Kim, please call patient to let him know I have discussed his case with Dr. Potter his nephrologist who is okay with proceeding with PET-CT.  I have ordered can you please schedule for him and follow-up with me afterwards.

## 2020-07-23 NOTE — PROGRESS NOTES
Subjective:      DATE OF VISIT: 7/23/20     ?  Patient ID:?Kodi Ribeiro is a 70 y.o. male.?? MR#: [id number]   ?   PRIMARY ONCOLOGIST: Dr. Rodriguez    ? Primary Care Providers:  Norma Nuñez MD, MD (General)     CHIEF COMPLAINT: ???  Follow-up after resection for colon adenocarcinoma?   ?   ONCOLOGIC DIAGNOSIS:  Colon adenocarcinoma, stage TBD, pT3 pN0 Mx  ?   CURRENT TREATMENT: TBD    PAST TREATMENT:  6//1/20 resection transverse colon  ?   ONCOLOGIC HISTORY:   ?    Mr. Ribeiro has been seen in our clinic for anemia found of iron deficiency status post IV iron therapy and recommended GI evaluation due to concern for bleed.    06/02/2020 colonoscopy and upper endoscopy revealed several sessile polyp polyps as well as fungating infiltrating ulcerated partially obstructing large mass in descending colon.  pathology:  Descending colon lesion with moderately differentiated adenocarcinoma.  Other polyps in cecum, descending colon tubular adenomas.    06/09/2020  Originally seen in our clinic for iron deficiency anemia refer to GI due to concern for GI bleed.  6/2/20 CEA 2.7    6/5/20 CT c/a/p notable for sub cm pulmonary nodules including 7 mm and 8 mm nodules in left upper lobe, 6 mm nodule superior middle lobe, concerning for metastatic disease.  06/11/2020 transverse colon resected, Dr. Yang.    Pathology:  Invasive moderately differentiated colon adenocarcinoma, 4.3 cm, no perforation, grade 2, invades through muscularis propria into pericolonic tissue, margins uninvolved closest 2.0 cm, no lymphovascular or perineural invasion.  0/17 lymph nodes involved.  pT3 N0.  mismatch repair proteins all intact.    HPI    He notes feeling better now than he has for couple years.  He has received IV iron therapy last 06/03/2020.  He was seen by my colleagues in Oncology during inpatient admission and noted reluctance for chemotherapy should he have metastatic colon adenocarcinoma and due to concern of toxicities given  other comorbidities.  He denies any acute issues regarding other comorbidities including no chest pain, dyspnea on exertion, edema.    Review of Systems    ?   A comprehensive 14-point review of systems was reviewed with patient and was negative other than as specified above.   ?   PAST MEDICAL HISTORY:   Past Medical History:   Diagnosis Date    Acute on chronic congestive heart failure 1/13/2020    Acute respiratory failure with hypoxia 1/14/2020    Analgesic nephropathy     Anemia     AP (angina pectoris) 1/11/2019    Arthritis     Colon polyp     Repeat colonoscopy due in 9/14    COPD exacerbation 2/6/2020    Coronary artery disease     Diverticulosis     colonoscopy 2/21/2014    Dysthymia 2/13/2020    Encounter for blood transfusion     GERD (gastroesophageal reflux disease)     Hemorrhoids     colonoscopy 2/21/2014    Horseshoe kidney     Hyperglycemia 3/17/2014    Hyperlipidemia     Hypertension     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    Late complications of amputation stump     rseolved with further amputation( MRSA then none since 2014)    Lipoma of colon     colonoscopy 2/21/2014    Myocardial infarction     per patient 2000 & 9/2012    Peripheral vascular disease     Phantom limb syndrome     patient reports only intermittent not problematic, not worsening    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    Spinal cord disease     L4L5 disc    Stroke     Tobacco dependence     resolved    Ureteral stent retained     ?     PAST SURGICAL HISTORY:   Past Surgical History:   Procedure Laterality Date    ABDOMINAL AORTIC ANEURYSM REPAIR      ABDOMINAL AORTIC ANEURYSM REPAIR  1996/2014    AMPUTATION, LOWER LIMB      AORTA - BILATERAL FEMORAL ARTERY BYPASS GRAFT  2014    Left and right leg    COLONOSCOPY N/A 6/2/2020    Procedure: COLONOSCOPY;  Surgeon: Rosanna Harrington MD;  Location: [facility];  Service: Endoscopy;  Laterality: N/A;    COLOSTOMY N/A 6/11/2020    Procedure:  CREATION, COLOSTOMY;  Surgeon: Leonardo Yang MD;  Location: [facility] OR;  Service: General;  Laterality: N/A;    CORONARY ANGIOPLASTY WITH STENT PLACEMENT  2000    Three placed in heart    CYSTOSCOPY W/ RETROGRADES Left 5/29/2018    Procedure: CYSTOSCOPY, WITH RETROGRADE PYELOGRAM;  Surgeon: Scooter Jin IV, MD;  Location: [facility] OR;  Service: Urology;  Laterality: Left;    CYSTOSCOPY W/ URETERAL STENT PLACEMENT Left 5/29/2018    Procedure: CYSTOSCOPY, WITH URETERAL STENT INSERTION;  Surgeon: Scooter Jin IV, MD;  Location: [facility] OR;  Service: Urology;  Laterality: Left;    CYSTOSCOPY W/ URETERAL STENT PLACEMENT Left 2/4/2020    Procedure: CYSTOSCOPY, WITH URETERAL STENT INSERTION;  Surgeon: Scooter Jin IV, MD;  Location: [facility] OR;  Service: Urology;  Laterality: Left;    CYSTOSCOPY W/ URETERAL STENT REMOVAL Left 5/29/2018    Procedure: CYSTOSCOPY, WITH URETERAL STENT REMOVAL;  Surgeon: Scooter Jin IV, MD;  Location: [facility] OR;  Service: Urology;  Laterality: Left;    CYSTOSCOPY W/ URETERAL STENT REMOVAL Left 2/4/2020    Procedure: CYSTOSCOPY, WITH URETERAL STENT REMOVAL;  Surgeon: Scooter Jin IV, MD;  Location: [facility] OR;  Service: Urology;  Laterality: Left;    ESOPHAGOGASTRODUODENOSCOPY N/A 6/2/2020    Procedure: EGD (ESOPHAGOGASTRODUODENOSCOPY);  Surgeon: Rosanna Harrington MD;  Location: [facility];  Service: Endoscopy;  Laterality: N/A;    FOOT AMPUTATION THROUGH METATARSAL  1996    left    FOOT SURGERY Bilateral 1980's    per patient multiple toe amputations prior to.  partial foot amputation:first great toe then other toes     INJECTION OF ANESTHETIC AGENT INTO TISSUE PLANE DEFINED BY TRANSVERSUS ABDOMINIS MUSCLE N/A 6/11/2020    Procedure: BLOCK, TRANSVERSUS ABDOMINIS PLANE;  Surgeon: Leonardo Yang MD;  Location: [facility];  Service: General;  Laterality: N/A;    KIDNEY SURGERY  2014    per patient separation of horseshoe kidney @ time of AAA repair    LEFT HEART  CATHETERIZATION Left 3/7/2019    Procedure: CATHETERIZATION, HEART, LEFT;  Surgeon: Adriel Boone MD;  Location: [facility] CATH LAB;  Service: Cardiology;  Laterality: Left;  630 admit for IV hydration  10am start    LUNG LOBECTOMY Right 1970s    per patient not cancer    LYSIS OF ADHESIONS N/A 6/11/2020    Procedure: LYSIS, ADHESIONS;  Surgeon: Leonardo Yang MD;  Location: [facility] OR;  Service: General;  Laterality: N/A;    OMENTECTOMY N/A 6/11/2020    Procedure: OMENTECTOMY;  Surgeon: Leonardo Yang MD;  Location: [facility] OR;  Service: General;  Laterality: N/A;    right below knee amputation  2009 (approx)    SMALL INTESTINE SURGERY  2014    per patient partial @ time of aaa repair  not small bowel - large bowel bowel compromised bythtwe AAAbowel    SUBTOTAL COLECTOMY N/A 6/11/2020    Procedure: COLECTOMY, PARTIAL;  Surgeon: Leonardo Yang MD;  Location: [facility] OR;  Service: General;  Laterality: N/A;    TONSILLECTOMY  1955 aprox    URETERAL STENT PLACEMENT Left     annually replaced since 2012 or so  Dr Jin      ?   ALLERGIES:   Allergies as of 07/23/2020 - Reviewed 07/21/2020   Allergen Reaction Noted    Morphine Itching 06/10/2013      ?   MEDICATIONS:?   Outpatient Medications Marked as Taking for the 7/23/20 encounter (Office Visit) with Lydia Rodriguez MD   Medication Sig Dispense Refill    albuterol-ipratropium (DUO-NEB) 2.5 mg-0.5 mg/3 mL nebulizer solution Take 3 mLs by nebulization every 8 (eight) hours as needed for Wheezing or Shortness of Breath. Rescue 90 mL 0    amLODIPine (NORVASC) 10 MG tablet TAKE 1 TABLET EVERY DAY 90 tablet 3    aspirin (ECOTRIN) 81 MG EC tablet Take 1 tablet (81 mg total) by mouth once daily.  0    atorvastatin (LIPITOR) 40 MG tablet Take 1 tablet (40 mg total) by mouth once daily. 90 tablet 3    carvedilol (COREG) 25 MG tablet Take 1 tablet (25 mg total) by mouth 2 (two) times daily with meals. 60 tablet 11    cetirizine (ZYRTEC) 10 MG tablet Take  10 mg by mouth once daily.      folic acid-vit B6-vit B12 2.5-25-2 mg (FOLBIC OR EQUIV) 2.5-25-2 mg Tab Take 1 tablet by mouth once daily. 90 tablet 3    furosemide (LASIX) 20 MG tablet Take two tab on Mon/Wed/Friday and one tab on Tues/Thurs/ Sat/ 124 tablet 3    hydrALAZINE (APRESOLINE) 25 MG tablet Take 1 tablet (25 mg total) by mouth every 12 (twelve) hours. 180 tablet 3    HYDROcodone-acetaminophen (NORCO) 7.5-325 mg per tablet Take 1 tablet by mouth every 8 (eight) hours as needed for Pain. 20 tablet 0    isosorbide mononitrate (IMDUR) 120 MG 24 hr tablet Take 1 tablet (120 mg total) by mouth once daily. 90 tablet 3    mupirocin (BACTROBAN) 2 % ointment Apply topically 3 (three) times daily. 1 Tube 2    nitroglycerin (NITROSTAT) 0.6 MG Subl Place 1 tablet (0.6 mg total) under the tongue every 5 (five) minutes as needed (max 3/ per episode). 30 tablet 1    omeprazole (PRILOSEC) 20 MG capsule TAKE 1 CAPSULE TWICE DAILY 180 capsule 3    OXYGEN-AIR DELIVERY SYSTEMS MISC by Misc.(Non-Drug; Combo Route) route.      sertraline (ZOLOFT) 50 MG tablet Take 1 tablet (50 mg total) by mouth once daily. 90 tablet 11    triamcinolone acetonide 0.1% (KENALOG) 0.1 % cream Apply topically 2 (two) times daily. 1 Tube 3      ?   SOCIAL HISTORY:?   Social History     Tobacco Use    Smoking status: Former Smoker     Packs/day: 1.00     Years: 15.00     Pack years: 15.00     Quit date: 2009     Years since quittin.5    Smokeless tobacco: Never Used   Substance Use Topics    Alcohol use: No      ?      ?   FAMILY HISTORY:   family history includes COPD in his mother; Cancer in his mother; Diabetes in his daughter; Heart disease in his father.   ?        Objective:      Physical Exam      ?   Vitals:    20 1313   BP: 132/67   Pulse: 72   Resp: 16   Temp: 98.2 °F (36.8 °C)      ?   ECOG:?1   General appearance: Generally well appearing, in no acute distress.   Head, eyes, ears, nose, and throat: moist  mucous membranes.   Respiratory:  Normal work of breathing  Abdomen: nontender, nondistended.   Extremities: Warm, without edema.   Neurologic: Alert and oriented. Grossly normal strength, coordination, and gait.   Skin: No rashes, ecchymoses or petechial lesion.   Psychiatric:  Normal mood and affect.    ?   Laboratory:  ?   Lab Results   Component Value Date    WBC 5.29 06/17/2020    HGB 9.8 (L) 06/17/2020    HCT 31.8 (L) 06/17/2020    MCV 88 06/17/2020     06/17/2020         Chemistry        Component Value Date/Time     06/17/2020 0625    K 4.0 06/17/2020 0625     (H) 06/17/2020 0625    CO2 22 (L) 06/17/2020 0625    BUN 10 06/17/2020 0625    CREATININE 1.7 (H) 06/17/2020 0625    GLU 93 06/17/2020 0625        Component Value Date/Time    CALCIUM 8.6 (L) 06/17/2020 0625    ALKPHOS 148 (H) 06/17/2020 0625    AST 12 06/17/2020 0625    ALT 11 06/17/2020 0625    BILITOT 0.4 06/17/2020 0625    ESTGFRAFRICA 46 (A) 06/17/2020 0625    EGFRNONAA 40 (A) 06/17/2020 0625        Lab Results   Component Value Date    IRON 27 (L) 05/18/2020    TIBC 364 05/18/2020    FERRITIN 71 05/18/2020       Tumor markers   ? CEA negative 6/2/20  ?   Imaging:  ?  I have reviewed the patient's medical history in detail and updated the computerized patient record.        Pathology:    I have reviewed the patient's medical history in detail and updated the computerized patient record.       ?   Assessment/Plan:       1. Colon adenocarcinoma    2. Iron deficiency anemia due to chronic blood loss          Plan:     # colon adenocarcinoma pT3 pN0 MX, MSI intact:  I have reviewed imaging including CT chest abdomen pelvis June 2020 with new pulmonary nodules 6-8 mm concerning for metastatic disease, new from prior chest imaging 2/2020.  He does understand that metastatic disease would indicate systemic therapy which she is quite opposed to, he notes primarily due to several other comorbidities and concern for tolerance.  He does  have excellent functional status ECOG 0-1 despite several comorbidities including CAD, PAD, CHF, CKD Stage III which appear to be under adequate medical management (e.g.  Tolerated well recent colonic resection).  I discussed that chemotherapy does have potential toxicities and comorbidities would increase risk of adverse effect but may not have absolute contraindication.  I recommended further evaluation of suspected lung metastases with PET scan (discussed with his nephrologist Dr. Jacobson, who is okay with PET-CT for this workup).  If imaging demonstrates oligometastatic disease may also consider radiation oncology referral for consideration of noninvasive local treatment in conjunction with systemic chemotherapy.   -will request STRATA for MEDARDO/EGFR/BRAF status    # anemia:  Status post IV iron therapy last 06/03/2020.  Will repeat iron indices and if deficient repeat IV iron therapy given persistent anemia.  May also be anemia of chronic illness with recent hospitalizations/resection and neoplastic disease.    Advance Care Planning   seen inpatient by palliative care, RV pending workup/treatment discussion ongoing.         Follow-Up:   Labs iron indices after clinic  PET-CT and RV after

## 2020-07-23 NOTE — PATIENT INSTRUCTIONS
Link labs to 8/4  Will consider PET-Ct after discussing with Dr. Jacobson and RV after PET if done   Please contact Dr Rodriguez's nurse if the PET needs to be scheduled. [phone] ask for Kim

## 2020-08-04 ENCOUNTER — LAB VISIT (OUTPATIENT)
Dept: LAB | Facility: HOSPITAL | Age: 70
End: 2020-08-04
Attending: INTERNAL MEDICINE
Payer: MEDICARE

## 2020-08-04 DIAGNOSIS — I73.9 PVD (PERIPHERAL VASCULAR DISEASE): ICD-10-CM

## 2020-08-04 DIAGNOSIS — N18.4 ANEMIA OF CHRONIC RENAL FAILURE, STAGE 4 (SEVERE): ICD-10-CM

## 2020-08-04 DIAGNOSIS — N17.9 AKI (ACUTE KIDNEY INJURY): ICD-10-CM

## 2020-08-04 DIAGNOSIS — R80.1 PERSISTENT PROTEINURIA: ICD-10-CM

## 2020-08-04 DIAGNOSIS — N18.4 CKD STAGE G4/A3, GFR 15-29 AND ALBUMIN CREATININE RATIO >300 MG/G: ICD-10-CM

## 2020-08-04 DIAGNOSIS — N13.5 URETERAL STRICTURE, LEFT: ICD-10-CM

## 2020-08-04 DIAGNOSIS — D63.1 ANEMIA OF CHRONIC RENAL FAILURE, STAGE 4 (SEVERE): ICD-10-CM

## 2020-08-04 DIAGNOSIS — I10 ESSENTIAL HYPERTENSION: ICD-10-CM

## 2020-08-04 DIAGNOSIS — N25.81 HYPERPARATHYROIDISM, SECONDARY RENAL: ICD-10-CM

## 2020-08-04 DIAGNOSIS — D50.0 IRON DEFICIENCY ANEMIA DUE TO CHRONIC BLOOD LOSS: ICD-10-CM

## 2020-08-04 DIAGNOSIS — Q63.1 HORSESHOE KIDNEY: ICD-10-CM

## 2020-08-04 LAB
ALBUMIN SERPL BCP-MCNC: 3.8 G/DL (ref 3.5–5.2)
ANION GAP SERPL CALC-SCNC: 9 MMOL/L (ref 8–16)
BASOPHILS # BLD AUTO: 0.06 K/UL (ref 0–0.2)
BASOPHILS NFR BLD: 1.1 % (ref 0–1.9)
BUN SERPL-MCNC: 35 MG/DL (ref 8–23)
CALCIUM SERPL-MCNC: 9.2 MG/DL (ref 8.7–10.5)
CHLORIDE SERPL-SCNC: 108 MMOL/L (ref 95–110)
CO2 SERPL-SCNC: 23 MMOL/L (ref 23–29)
CREAT SERPL-MCNC: 2.6 MG/DL (ref 0.5–1.4)
DIFFERENTIAL METHOD: ABNORMAL
EOSINOPHIL # BLD AUTO: 0.2 K/UL (ref 0–0.5)
EOSINOPHIL NFR BLD: 4.1 % (ref 0–8)
ERYTHROCYTE [DISTWIDTH] IN BLOOD BY AUTOMATED COUNT: 17.2 % (ref 11.5–14.5)
EST. GFR  (AFRICAN AMERICAN): 27.5 ML/MIN/1.73 M^2
EST. GFR  (NON AFRICAN AMERICAN): 23.7 ML/MIN/1.73 M^2
GLUCOSE SERPL-MCNC: 87 MG/DL (ref 70–110)
HCT VFR BLD AUTO: 34.3 % (ref 40–54)
HGB BLD-MCNC: 10.2 G/DL (ref 14–18)
IMM GRANULOCYTES # BLD AUTO: 0.02 K/UL (ref 0–0.04)
IMM GRANULOCYTES NFR BLD AUTO: 0.4 % (ref 0–0.5)
IRON SERPL-MCNC: 39 UG/DL (ref 45–160)
LYMPHOCYTES # BLD AUTO: 1.3 K/UL (ref 1–4.8)
LYMPHOCYTES NFR BLD: 23.3 % (ref 18–48)
MCH RBC QN AUTO: 26.3 PG (ref 27–31)
MCHC RBC AUTO-ENTMCNC: 29.7 G/DL (ref 32–36)
MCV RBC AUTO: 88 FL (ref 82–98)
MONOCYTES # BLD AUTO: 0.6 K/UL (ref 0.3–1)
MONOCYTES NFR BLD: 9.7 % (ref 4–15)
NEUTROPHILS # BLD AUTO: 3.5 K/UL (ref 1.8–7.7)
NEUTROPHILS NFR BLD: 61.4 % (ref 38–73)
NRBC BLD-RTO: 0 /100 WBC
PHOSPHATE SERPL-MCNC: 4 MG/DL (ref 2.7–4.5)
PLATELET # BLD AUTO: 246 K/UL (ref 150–350)
PMV BLD AUTO: 10.3 FL (ref 9.2–12.9)
POTASSIUM SERPL-SCNC: 5.1 MMOL/L (ref 3.5–5.1)
RBC # BLD AUTO: 3.88 M/UL (ref 4.6–6.2)
SATURATED IRON: 12 % (ref 20–50)
SODIUM SERPL-SCNC: 140 MMOL/L (ref 136–145)
TOTAL IRON BINDING CAPACITY: 337 UG/DL (ref 250–450)
TRANSFERRIN SERPL-MCNC: 228 MG/DL (ref 200–375)
WBC # BLD AUTO: 5.66 K/UL (ref 3.9–12.7)

## 2020-08-04 PROCEDURE — 82728 ASSAY OF FERRITIN: CPT | Mod: HCNC

## 2020-08-04 PROCEDURE — 83540 ASSAY OF IRON: CPT | Mod: HCNC

## 2020-08-04 PROCEDURE — 80069 RENAL FUNCTION PANEL: CPT | Mod: HCNC

## 2020-08-04 PROCEDURE — 85025 COMPLETE CBC W/AUTO DIFF WBC: CPT | Mod: HCNC

## 2020-08-04 PROCEDURE — 82610 CYSTATIN C: CPT | Mod: HCNC

## 2020-08-05 ENCOUNTER — TELEPHONE (OUTPATIENT)
Dept: NEPHROLOGY | Facility: CLINIC | Age: 70
End: 2020-08-05

## 2020-08-05 ENCOUNTER — TELEPHONE (OUTPATIENT)
Dept: RADIOLOGY | Facility: HOSPITAL | Age: 70
End: 2020-08-05

## 2020-08-05 LAB — FERRITIN SERPL-MCNC: 64 NG/ML (ref 20–300)

## 2020-08-05 NOTE — PROGRESS NOTES
Patient seems to have acute kidney injury from dehydration.  Please advise the patient to drink plenty of fluids to make the urine looked like water.  Repeat labs again on the day of his clinic visit.

## 2020-08-06 ENCOUNTER — HOSPITAL ENCOUNTER (OUTPATIENT)
Dept: RADIOLOGY | Facility: HOSPITAL | Age: 70
Discharge: HOME | End: 2020-08-06
Attending: INTERNAL MEDICINE
Payer: MEDICARE

## 2020-08-06 DIAGNOSIS — C34.12 MALIGNANT NEOPLASM OF UPPER LOBE, LEFT BRONCHUS OR LUNG: ICD-10-CM

## 2020-08-06 DIAGNOSIS — C18.9 COLON ADENOCARCINOMA: ICD-10-CM

## 2020-08-06 PROCEDURE — A9552 F18 FDG: HCPCS | Mod: HCNC

## 2020-08-06 PROCEDURE — 78815 PET IMAGE W/CT SKULL-THIGH: CPT | Mod: 26,PI,HCNC, | Performed by: RADIOLOGY

## 2020-08-06 PROCEDURE — 78815 NM PET CT ROUTINE: ICD-10-PCS | Mod: 26,PI,HCNC, | Performed by: RADIOLOGY

## 2020-08-06 PROCEDURE — 78815 PET IMAGE W/CT SKULL-THIGH: CPT | Mod: TC,HCNC,PI

## 2020-08-07 ENCOUNTER — DOCUMENT SCAN (OUTPATIENT)
Dept: HOME HEALTH SERVICES | Facility: HOSPITAL | Age: 70
End: 2020-08-07
Payer: MEDICARE

## 2020-08-11 ENCOUNTER — OFFICE VISIT (OUTPATIENT)
Dept: NEPHROLOGY | Facility: CLINIC | Age: 70
End: 2020-08-11
Payer: MEDICARE

## 2020-08-11 VITALS
DIASTOLIC BLOOD PRESSURE: 76 MMHG | BODY MASS INDEX: 22.97 KG/M2 | SYSTOLIC BLOOD PRESSURE: 138 MMHG | HEART RATE: 70 BPM | WEIGHT: 173.31 LBS | HEIGHT: 73 IN

## 2020-08-11 DIAGNOSIS — D63.1 ANEMIA OF CHRONIC RENAL FAILURE, STAGE 4 (SEVERE): ICD-10-CM

## 2020-08-11 DIAGNOSIS — N18.4 ANEMIA OF CHRONIC RENAL FAILURE, STAGE 4 (SEVERE): ICD-10-CM

## 2020-08-11 DIAGNOSIS — N13.5 URETERAL STRICTURE, LEFT: ICD-10-CM

## 2020-08-11 DIAGNOSIS — I73.9 PVD (PERIPHERAL VASCULAR DISEASE): ICD-10-CM

## 2020-08-11 DIAGNOSIS — I10 ESSENTIAL HYPERTENSION: ICD-10-CM

## 2020-08-11 DIAGNOSIS — N25.81 HYPERPARATHYROIDISM, SECONDARY RENAL: ICD-10-CM

## 2020-08-11 DIAGNOSIS — N18.4 CKD STAGE G4/A3, GFR 15-29 AND ALBUMIN CREATININE RATIO >300 MG/G: ICD-10-CM

## 2020-08-11 DIAGNOSIS — N17.9 AKI (ACUTE KIDNEY INJURY): Primary | ICD-10-CM

## 2020-08-11 DIAGNOSIS — R80.1 PERSISTENT PROTEINURIA: ICD-10-CM

## 2020-08-11 DIAGNOSIS — E55.9 VITAMIN D DEFICIENCY: ICD-10-CM

## 2020-08-11 DIAGNOSIS — Q63.1 HORSESHOE KIDNEY: ICD-10-CM

## 2020-08-11 PROCEDURE — 1101F PT FALLS ASSESS-DOCD LE1/YR: CPT | Mod: HCNC,CPTII,S$GLB, | Performed by: INTERNAL MEDICINE

## 2020-08-11 PROCEDURE — 3075F SYST BP GE 130 - 139MM HG: CPT | Mod: HCNC,CPTII,S$GLB, | Performed by: INTERNAL MEDICINE

## 2020-08-11 PROCEDURE — 3078F DIAST BP <80 MM HG: CPT | Mod: HCNC,CPTII,S$GLB, | Performed by: INTERNAL MEDICINE

## 2020-08-11 PROCEDURE — 99999 PR PBB SHADOW E&M-EST. PATIENT-LVL III: ICD-10-PCS | Mod: PBBFAC,HCNC,, | Performed by: INTERNAL MEDICINE

## 2020-08-11 PROCEDURE — 99999 PR PBB SHADOW E&M-EST. PATIENT-LVL III: CPT | Mod: PBBFAC,HCNC,, | Performed by: INTERNAL MEDICINE

## 2020-08-11 PROCEDURE — 1101F PR PT FALLS ASSESS DOC 0-1 FALLS W/OUT INJ PAST YR: ICD-10-PCS | Mod: HCNC,CPTII,S$GLB, | Performed by: INTERNAL MEDICINE

## 2020-08-11 PROCEDURE — 99499 RISK ADDL DX/OHS AUDIT: ICD-10-PCS | Mod: HCNC,S$GLB,, | Performed by: INTERNAL MEDICINE

## 2020-08-11 PROCEDURE — 3078F PR MOST RECENT DIASTOLIC BLOOD PRESSURE < 80 MM HG: ICD-10-PCS | Mod: HCNC,CPTII,S$GLB, | Performed by: INTERNAL MEDICINE

## 2020-08-11 PROCEDURE — 3008F BODY MASS INDEX DOCD: CPT | Mod: HCNC,CPTII,S$GLB, | Performed by: INTERNAL MEDICINE

## 2020-08-11 PROCEDURE — 99215 OFFICE O/P EST HI 40 MIN: CPT | Mod: HCNC,S$GLB,, | Performed by: INTERNAL MEDICINE

## 2020-08-11 PROCEDURE — 1159F MED LIST DOCD IN RCRD: CPT | Mod: HCNC,S$GLB,, | Performed by: INTERNAL MEDICINE

## 2020-08-11 PROCEDURE — 1159F PR MEDICATION LIST DOCUMENTED IN MEDICAL RECORD: ICD-10-PCS | Mod: HCNC,S$GLB,, | Performed by: INTERNAL MEDICINE

## 2020-08-11 PROCEDURE — 99215 PR OFFICE/OUTPT VISIT, EST, LEVL V, 40-54 MIN: ICD-10-PCS | Mod: HCNC,S$GLB,, | Performed by: INTERNAL MEDICINE

## 2020-08-11 PROCEDURE — 3075F PR MOST RECENT SYSTOLIC BLOOD PRESS GE 130-139MM HG: ICD-10-PCS | Mod: HCNC,CPTII,S$GLB, | Performed by: INTERNAL MEDICINE

## 2020-08-11 PROCEDURE — 3008F PR BODY MASS INDEX (BMI) DOCUMENTED: ICD-10-PCS | Mod: HCNC,CPTII,S$GLB, | Performed by: INTERNAL MEDICINE

## 2020-08-11 PROCEDURE — 99499 UNLISTED E&M SERVICE: CPT | Mod: HCNC,S$GLB,, | Performed by: INTERNAL MEDICINE

## 2020-08-11 NOTE — PROGRESS NOTES
Subjective:       Patient ID: Kodi Ribeiro is a 70 y.o. White male who presents for follow-up evaluation of Acute Renal Failure and Chronic Kidney Disease    Hypertension  Associated symptoms include chest pain and shortness of breath. Pertinent negatives include no headaches, neck pain or palpitations.        Patient is a 70-year-old male with longstanding history of hypertension and peripheral arterial disease with multiple complications including AAA.  Patient had a AAA repair.  Also had complications of left-sided ureteric stricture.  Patient has a history of horseshoe kidney.  The 2 kidneys were  .  He chronically has left-sided ureteric stent replaced by Dr. Jin yearly.  Majority of his kidney function comes from the right kidney.  Left kidney seems to have minimal kidney function.  Has had complications of pyelonephritis on the left kidney.  Patient has severe peripheral artery disease status post a right BKA and left foot amputation.  He has quit smoking.  His baseline creatinine has been ranging between 1.4 and 1.5.  He has been seen in the nephrology division back in the year 2014.  Lately he is having chest pain off and on and is being considered for cardiac catheterization.  His creatinine was found to have gone up from 1 0.4-1.5 as baseline up to 2.6 earlier this month of January 2019.  Repeat labs show creatinine of 2.3.    January 2019 patient seen for acute kidney injury on chronic kidney disease.  Losartan stop.  Screening for ischemic nephropathy.  Avoid nonsteroidals.  Patient has been taking ibuprofen.    February 2019.  Ultrasound reviewed.  No definite of  renal artery stenosis.  Heavy proteinuria.  GFR 25% with creatinine of 2.4.  .  Vitamin-D 6, anemia      May 2020 patient comes back for follow-up.  Multiple episodes of acute kidney injury with small bowel obstruction obstructive nephropathy , CHF and infections.  All records reviewed.  Last creatinine we have is  "from April with a creatinine of 2.2.  Approximate GFR 20-25%.  Cystatin C showed GFR 23%. ( says iron pill causes SBO) ; left testicular hematoma in 4 2020 --is better     June 2020 colonoscopy showed colon cancer status post partial colectomy by        August 2020 patient now has a diagnosis of adenocarcinoma of the colon.  Have some lung nodules suspicious for metastatic disease.  S/p 8/6/2020 PET scan.  Creatinine is up to 2.6 with acute kidney injury.      Review of Systems   Constitutional: Negative.  Negative for activity change, appetite change, chills, diaphoresis, fatigue and fever.   HENT: Negative.  Negative for congestion and trouble swallowing.    Eyes: Negative.    Respiratory: Positive for chest tightness and shortness of breath. Negative for cough and wheezing.    Cardiovascular: Positive for chest pain. Negative for palpitations and leg swelling.   Gastrointestinal: Negative.  Negative for abdominal distention, abdominal pain, nausea and vomiting.   Genitourinary: Negative.  Negative for decreased urine volume, difficulty urinating, dysuria, enuresis, flank pain, frequency, hematuria, penile swelling, scrotal swelling and urgency.   Musculoskeletal: Negative.  Negative for arthralgias, back pain, joint swelling and neck pain.   Skin: Negative for rash.   Neurological: Negative.  Negative for tremors, seizures and headaches.   Psychiatric/Behavioral: Negative.  Negative for confusion and sleep disturbance. The patient is not nervous/anxious.        Objective:   /76   Pulse 70   Ht 6' 1" (1.854 m)   Wt 78.6 kg (173 lb 4.5 oz)   BMI 22.86 kg/m²      Physical Exam  Vitals signs and nursing note reviewed.   Constitutional:       General: He is not in acute distress.     Appearance: He is well-developed.   HENT:      Head: Normocephalic.   Eyes:      Pupils: Pupils are equal, round, and reactive to light.   Neck:      Musculoskeletal: Normal range of motion and neck supple.      " Thyroid: No thyromegaly.      Vascular: No JVD.   Cardiovascular:      Rate and Rhythm: Normal rate and regular rhythm.      Chest Wall: PMI is not displaced.      Heart sounds: S1 normal and S2 normal. Murmur present. No friction rub. No gallop.    Pulmonary:      Effort: Pulmonary effort is normal. No respiratory distress.      Breath sounds: Decreased breath sounds present. No wheezing or rales.   Chest:      Chest wall: No tenderness.   Abdominal:      General: Bowel sounds are normal. There is no distension.      Palpations: Abdomen is soft. There is no mass.      Tenderness: There is no abdominal tenderness. There is no rebound.      Hernia: No hernia is present.      Comments: RLQ colostomy    Musculoskeletal: Normal range of motion.         General: No tenderness.      Comments: Right BKA.  Left foot amputation.  Peripheral arterial disease   Lymphadenopathy:      Cervical: No cervical adenopathy.   Skin:     General: Skin is warm and dry.      Capillary Refill: Capillary refill takes less than 2 seconds.      Findings: No erythema or rash.   Neurological:      Mental Status: He is alert and oriented to person, place, and time. He is not disoriented.      Cranial Nerves: No cranial nerve deficit.      Motor: No abnormal muscle tone.      Coordination: Coordination normal.      Deep Tendon Reflexes: Reflexes are normal and symmetric. Reflexes normal.   Psychiatric:         Behavior: Behavior normal.         Thought Content: Thought content normal.         Judgment: Judgment normal.           Lab Results   Component Value Date    CREATININE 2.6 (H) 08/04/2020    BUN 35 (H) 08/04/2020     08/04/2020    K 5.1 08/04/2020     08/04/2020    CO2 23 08/04/2020     Lab Results   Component Value Date    WBC 5.66 08/04/2020    HGB 10.2 (L) 08/04/2020    HCT 34.3 (L) 08/04/2020    MCV 88 08/04/2020     08/04/2020     Lab Results   Component Value Date    .0 (H) 02/26/2020    CALCIUM 9.2  08/04/2020    PHOS 4.0 08/04/2020     @RESUFAST(URICACID)    Assessment:    )    1. MARCELA (acute kidney injury)    2. 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 Ureteral stricture, left    4. Horseshoe kidney    5. PVD (peripheral vascular disease)    6. Essential hypertension    7. Persistent proteinuria    8. Anemia of chronic renal failure, stage 4 (severe)    9. Vitamin D deficiency    10. Hyperparathyroidism, secondary renal        Plan:         1.  Acute kidney injury on Chronic kidney disease stage 4:  Multiple episodes of obstructive nephropathy and small bowel obstruction.   Likely hypertensive nephropathy.  GFR 20- 25%.  Cystatin C shows GFR 23%.  Heavy proteinuria.  Likely due to hypertensive nephropathy.         Hold lasix       Repeat labs       3.  Nonfunctional left kidney with ureteric stricture status post stent placement in 2/2020 chronic obstruction . Repeat USD before follow up Follow-up with Dr. Jin    4.  Essential hypertension:  Blood pressure target would be 130.     5.  Heavy proteinuria most likely hypertensive nephropathy:  Due to advanced kidney failure and recurrent acute kidney injury I would avoid ARB    6.  Anemia due to chronic kidney disease:  S/p Consult Hematology , all records reviewed    7.  Secondary hyperparathyroidism with severe vitamin-D deficiency:   ergocalciferol weekly.  Surveillance for hyperphosphatemia and hypocalcemia will be continued.          Follow-up 2-3 months

## 2020-08-14 ENCOUNTER — OFFICE VISIT (OUTPATIENT)
Dept: HEMATOLOGY/ONCOLOGY | Facility: CLINIC | Age: 70
End: 2020-08-14
Payer: MEDICARE

## 2020-08-14 VITALS
TEMPERATURE: 97 F | HEART RATE: 82 BPM | BODY MASS INDEX: 23.05 KG/M2 | SYSTOLIC BLOOD PRESSURE: 150 MMHG | DIASTOLIC BLOOD PRESSURE: 72 MMHG | HEIGHT: 73 IN | WEIGHT: 173.94 LBS | OXYGEN SATURATION: 98 %

## 2020-08-14 DIAGNOSIS — R91.8 PULMONARY NODULES/LESIONS, MULTIPLE: ICD-10-CM

## 2020-08-14 DIAGNOSIS — D50.0 IRON DEFICIENCY ANEMIA DUE TO CHRONIC BLOOD LOSS: ICD-10-CM

## 2020-08-14 DIAGNOSIS — C18.9 COLON ADENOCARCINOMA: Primary | ICD-10-CM

## 2020-08-14 PROCEDURE — 3077F SYST BP >= 140 MM HG: CPT | Mod: HCNC,CPTII,S$GLB, | Performed by: INTERNAL MEDICINE

## 2020-08-14 PROCEDURE — 1101F PR PT FALLS ASSESS DOC 0-1 FALLS W/OUT INJ PAST YR: ICD-10-PCS | Mod: HCNC,CPTII,S$GLB, | Performed by: INTERNAL MEDICINE

## 2020-08-14 PROCEDURE — 99999 PR PBB SHADOW E&M-EST. PATIENT-LVL V: CPT | Mod: PBBFAC,HCNC,, | Performed by: INTERNAL MEDICINE

## 2020-08-14 PROCEDURE — 1101F PT FALLS ASSESS-DOCD LE1/YR: CPT | Mod: HCNC,CPTII,S$GLB, | Performed by: INTERNAL MEDICINE

## 2020-08-14 PROCEDURE — 3008F PR BODY MASS INDEX (BMI) DOCUMENTED: ICD-10-PCS | Mod: HCNC,CPTII,S$GLB, | Performed by: INTERNAL MEDICINE

## 2020-08-14 PROCEDURE — 99999 PR PBB SHADOW E&M-EST. PATIENT-LVL V: ICD-10-PCS | Mod: PBBFAC,HCNC,, | Performed by: INTERNAL MEDICINE

## 2020-08-14 PROCEDURE — 99214 PR OFFICE/OUTPT VISIT, EST, LEVL IV, 30-39 MIN: ICD-10-PCS | Mod: HCNC,S$GLB,, | Performed by: INTERNAL MEDICINE

## 2020-08-14 PROCEDURE — 1159F MED LIST DOCD IN RCRD: CPT | Mod: HCNC,S$GLB,, | Performed by: INTERNAL MEDICINE

## 2020-08-14 PROCEDURE — 1126F AMNT PAIN NOTED NONE PRSNT: CPT | Mod: HCNC,S$GLB,, | Performed by: INTERNAL MEDICINE

## 2020-08-14 PROCEDURE — 3078F DIAST BP <80 MM HG: CPT | Mod: HCNC,CPTII,S$GLB, | Performed by: INTERNAL MEDICINE

## 2020-08-14 PROCEDURE — 3008F BODY MASS INDEX DOCD: CPT | Mod: HCNC,CPTII,S$GLB, | Performed by: INTERNAL MEDICINE

## 2020-08-14 PROCEDURE — 3077F PR MOST RECENT SYSTOLIC BLOOD PRESSURE >= 140 MM HG: ICD-10-PCS | Mod: HCNC,CPTII,S$GLB, | Performed by: INTERNAL MEDICINE

## 2020-08-14 PROCEDURE — 99499 UNLISTED E&M SERVICE: CPT | Mod: HCNC,S$GLB,, | Performed by: INTERNAL MEDICINE

## 2020-08-14 PROCEDURE — 1126F PR PAIN SEVERITY QUANTIFIED, NO PAIN PRESENT: ICD-10-PCS | Mod: HCNC,S$GLB,, | Performed by: INTERNAL MEDICINE

## 2020-08-14 PROCEDURE — 3078F PR MOST RECENT DIASTOLIC BLOOD PRESSURE < 80 MM HG: ICD-10-PCS | Mod: HCNC,CPTII,S$GLB, | Performed by: INTERNAL MEDICINE

## 2020-08-14 PROCEDURE — 99499 RISK ADDL DX/OHS AUDIT: ICD-10-PCS | Mod: HCNC,S$GLB,, | Performed by: INTERNAL MEDICINE

## 2020-08-14 PROCEDURE — 1159F PR MEDICATION LIST DOCUMENTED IN MEDICAL RECORD: ICD-10-PCS | Mod: HCNC,S$GLB,, | Performed by: INTERNAL MEDICINE

## 2020-08-14 PROCEDURE — 99214 OFFICE O/P EST MOD 30 MIN: CPT | Mod: HCNC,S$GLB,, | Performed by: INTERNAL MEDICINE

## 2020-08-14 NOTE — PROGRESS NOTES
Subjective:      DATE OF VISIT: 8/14/20     ?  Patient ID:?Kodi Ribeiro is a 70 y.o. male.?? MR#: [id number]   ?   PRIMARY ONCOLOGIST: Dr. Rodriguez    ? Primary Care Providers:  Norma Nuñez MD, MD (General)     CHIEF COMPLAINT: ???  Follow-up after resection for colon adenocarcinoma?   ?   ONCOLOGIC DIAGNOSIS:  Colon adenocarcinoma, stage TBD, pT3 pN0 Mx  ?   CURRENT TREATMENT: TBD    PAST TREATMENT:  6//1/20 resection transverse colon  ?   ONCOLOGIC HISTORY:   ?    Mr. Ribeiro has been seen in our clinic for anemia found of iron deficiency status post IV iron therapy and recommended GI evaluation due to concern for bleed.    06/02/2020 colonoscopy and upper endoscopy revealed several sessile polyp polyps as well as fungating infiltrating ulcerated partially obstructing large mass in descending colon.  pathology:  Descending colon lesion with moderately differentiated adenocarcinoma.  Other polyps in cecum, descending colon tubular adenomas.    06/09/2020  Originally seen in our clinic for iron deficiency anemia refer to GI due to concern for GI bleed.  6/2/20 CEA 2.7    6/5/20 CT c/a/p notable for sub cm pulmonary nodules including 7 mm and 8 mm nodules in left upper lobe, 6 mm nodule superior middle lobe, concerning for metastatic disease.  06/11/2020 transverse colon resected, Dr. Yang.    Pathology:  Invasive moderately differentiated colon adenocarcinoma, 4.3 cm, no perforation, grade 2, invades through muscularis propria into pericolonic tissue, margins uninvolved closest 2.0 cm, no lymphovascular or perineural invasion.  0/17 lymph nodes involved.  pT3 N0.  mismatch repair proteins all intact.    HPI    He returns for follow-up after PET scan accompanied by his wife.  He is in stable medical condition.  He continues to have fatigue.    Review of Systems    ?   A comprehensive 14-point review of systems was reviewed with patient and was negative other than as specified above.   ?   Objective:       Physical Exam      ?   Vitals:    08/14/20 1125   BP: (!) 150/72   Pulse: 82   Temp: 97.1 °F (36.2 °C)      ?   ECOG:?1   General appearance: Generally well appearing, in no acute distress.   Head, eyes, ears, nose, and throat: moist mucous membranes.   Respiratory:  Normal work of breathing  Abdomen: nontender, nondistended.   Extremities: Warm, without edema.   Neurologic: Alert and oriented. Grossly normal strength, coordination, and gait.  Prosthetic leg  Skin: No rashes, ecchymoses or petechial lesion.   Psychiatric:  Normal mood and affect.    ?   Laboratory:  ?   Lab Results   Component Value Date    WBC 5.66 08/04/2020    HGB 10.2 (L) 08/04/2020    HCT 34.3 (L) 08/04/2020    MCV 88 08/04/2020     08/04/2020         Chemistry        Component Value Date/Time     08/04/2020 1013    K 5.1 08/04/2020 1013     08/04/2020 1013    CO2 23 08/04/2020 1013    BUN 35 (H) 08/04/2020 1013    CREATININE 2.6 (H) 08/04/2020 1013    GLU 87 08/04/2020 1013        Component Value Date/Time    CALCIUM 9.2 08/04/2020 1013    ALKPHOS 148 (H) 06/17/2020 0625    AST 12 06/17/2020 0625    ALT 11 06/17/2020 0625    BILITOT 0.4 06/17/2020 0625    ESTGFRAFRICA 27.5 (A) 08/04/2020 1013    EGFRNONAA 23.7 (A) 08/04/2020 1013        Lab Results   Component Value Date    IRON 39 (L) 08/04/2020    TIBC 337 08/04/2020    FERRITIN 64 08/04/2020       Tumor markers   ? CEA negative 6/2/20  ?   Imaging:  ?  7/30/20  NM PET CT ROUTINE     CLINICAL HISTORY:  staging colon adeno; Malignant neoplasm of upper lobe, left bronchus or lung     TECHNIQUE:  12.4 mCi of F18-FDG was administered intravenously in the left antecubital fossa.  After an approximately 60 min distribution time, PET/CT images were acquired from the skull base to the mid thigh. Transmission images were acquired to correct for attenuation using a whole body low-dose CT scan without contrast with the arms positioned above the head.     COMPARISON:  CT dated  06/05/2020     FINDINGS:  Quality of the study: Adequate.     Head neck: No abnormal foci of increased tracer uptake are present.     Chest: There is no elevated FDG uptake seen associated with in the of the pulmonary nodules described on prior CT.  Note that this may potentially be related to small lesion size.  Metastatic disease cannot be entirely excluded.  No FDG avid adenopathy demonstrated.     Abdomen and pelvis: No abnormal foci of increased tracer uptake are present.     Skeletal: There is some low level FDG uptake associated with the bilateral acromioclavicular joints, worse on the right.  Findings are favored to be degenerative in nature.  No suspicious FDG avid osseous lesions visualized.     Physiologic uptake of the tracer is present within the brain, salivary glands, myocardium, GI and  tracts.     Incidental CT findings: Prior partial colectomy with right lower quadrant colostomy noted.  Numerous sigmoid colonic diverticula present without evidence of acute diverticulitis.  There is a left ureteral stent in place.  Moderate left-sided hydronephrosis appears similar to prior.  Axillofemoral and fem-fem bypass grafts are present.  Fairly extensive calcified atherosclerotic plaque noted throughout the chest, abdomen, and pelvis.     Impression:     No appreciable elevated FDG uptake associated with multiple previously described small pulmonary nodules.  Note that this may potentially be related to small lesion size.  Cannot entirely exclude metastatic disease.  Follow-up is recommended.     No suspicious foci of elevated FDG uptake.    Pathology:    I have reviewed the patient's medical history in detail and updated the computerized patient record.       ?   Assessment/Plan:       1. Colon adenocarcinoma    2. Iron deficiency anemia due to chronic blood loss    3. Pulmonary nodules/lesions, multiple          Plan:     # colon adenocarcinoma, pT3 pN0 MX, MSI intact:  I have reviewed imaging including  CT chest abdomen pelvis June 2020 with new pulmonary nodules 6-8 mm concerning for metastatic disease, new from prior chest imaging 2/2020.  They have personally reviewed and shared with him in clinic today results of PET scan which is without avid disease however did note likely limited by size of lesions in lung does not exclude metastatic disease.  I will reach out to Interventional Radiology determine if possible to biopsy however in discussing this possibility with Mr. Ross he is not enthusiastic about biopsy at this time and opposed to chemotherapy currently, citing several comorbidities and concern for tolerance of chemotherapy.  I did discuss risks and benefits of chemotherapy which in the setting of metastatic disease would be palliative in nature.  He would be amenable to repeat CT scan in 3 months and at that time may consider treatment for metastatic colon cancer should pulmonary nodules grow further.  He understands in the absence of treatment I would expect metastatic disease to progress and may have further spread.  He expressed understanding of this is not amenable to further evaluation or treatment at this time.  -requested STRATA     # anemia:  Status post IV iron therapy last 06/03/2020.  repeat iron indices continue to be on low end with persistent anemia and recommended repeat IV iron therapy.   Anemia likely multifactorial however given chronic illness and neoplastic disease..  May also be anemia of chronic illness with recent hospitalizations/resection and neoplastic disease.    Advance Care Planning   seen inpatient by palliative care, RV pending workup/treatment discussion ongoing.         Follow-Up:   Iv iron feraheme x 2 doses 1 week apart   RV in 3 months with labs and Ct scan 1 week prior

## 2020-08-17 PROCEDURE — G0179 MD RECERTIFICATION HHA PT: HCPCS | Mod: ,,, | Performed by: FAMILY MEDICINE

## 2020-08-17 PROCEDURE — G0179 PR HOME HEALTH MD RECERTIFICATION: ICD-10-PCS | Mod: ,,, | Performed by: FAMILY MEDICINE

## 2020-08-24 ENCOUNTER — EXTERNAL HOME HEALTH (OUTPATIENT)
Dept: HOME HEALTH SERVICES | Facility: HOSPITAL | Age: 70
End: 2020-08-24
Payer: MEDICARE

## 2020-08-24 PROBLEM — Z09 FOLLOW UP: Status: RESOLVED | Noted: 2020-02-13 | Resolved: 2020-08-24

## 2020-08-28 ENCOUNTER — CARE AT HOME (OUTPATIENT)
Dept: HOME HEALTH SERVICES | Facility: CLINIC | Age: 70
End: 2020-08-28
Payer: MEDICARE

## 2020-08-28 VITALS
HEART RATE: 68 BPM | TEMPERATURE: 98 F | SYSTOLIC BLOOD PRESSURE: 135 MMHG | RESPIRATION RATE: 18 BRPM | OXYGEN SATURATION: 97 % | DIASTOLIC BLOOD PRESSURE: 62 MMHG

## 2020-08-28 DIAGNOSIS — Z09 FOLLOW UP: ICD-10-CM

## 2020-08-28 PROCEDURE — 1101F PT FALLS ASSESS-DOCD LE1/YR: CPT | Mod: CPTII,S$GLB,, | Performed by: NURSE PRACTITIONER

## 2020-08-28 PROCEDURE — 1126F AMNT PAIN NOTED NONE PRSNT: CPT | Mod: S$GLB,,, | Performed by: NURSE PRACTITIONER

## 2020-08-28 PROCEDURE — 3078F DIAST BP <80 MM HG: CPT | Mod: CPTII,S$GLB,, | Performed by: NURSE PRACTITIONER

## 2020-08-28 PROCEDURE — 1126F PR PAIN SEVERITY QUANTIFIED, NO PAIN PRESENT: ICD-10-PCS | Mod: S$GLB,,, | Performed by: NURSE PRACTITIONER

## 2020-08-28 PROCEDURE — 1101F PR PT FALLS ASSESS DOC 0-1 FALLS W/OUT INJ PAST YR: ICD-10-PCS | Mod: CPTII,S$GLB,, | Performed by: NURSE PRACTITIONER

## 2020-08-28 PROCEDURE — 3075F PR MOST RECENT SYSTOLIC BLOOD PRESS GE 130-139MM HG: ICD-10-PCS | Mod: CPTII,S$GLB,, | Performed by: NURSE PRACTITIONER

## 2020-08-28 PROCEDURE — 99349 PR HOME VISIT,ESTAB PATIENT,LEVEL III: ICD-10-PCS | Mod: S$GLB,,, | Performed by: NURSE PRACTITIONER

## 2020-08-28 PROCEDURE — 1159F MED LIST DOCD IN RCRD: CPT | Mod: S$GLB,,, | Performed by: NURSE PRACTITIONER

## 2020-08-28 PROCEDURE — 99349 HOME/RES VST EST MOD MDM 40: CPT | Mod: S$GLB,,, | Performed by: NURSE PRACTITIONER

## 2020-08-28 PROCEDURE — 3078F PR MOST RECENT DIASTOLIC BLOOD PRESSURE < 80 MM HG: ICD-10-PCS | Mod: CPTII,S$GLB,, | Performed by: NURSE PRACTITIONER

## 2020-08-28 PROCEDURE — 3075F SYST BP GE 130 - 139MM HG: CPT | Mod: CPTII,S$GLB,, | Performed by: NURSE PRACTITIONER

## 2020-08-28 PROCEDURE — 1159F PR MEDICATION LIST DOCUMENTED IN MEDICAL RECORD: ICD-10-PCS | Mod: S$GLB,,, | Performed by: NURSE PRACTITIONER

## 2020-08-29 NOTE — PROGRESS NOTES
[facility]  Medical Home Visit    Visit Date: 8/28/2020  Encounter Provider: Porter Salinas NP  PCP:  Norma Nuñez MD    Subjective:      Patient ID: Kodi Ribeiro is a 70 y.o. male.    Consult Requested By:  Porter Salinas  Reason for Consult: Medical Follow-Up and Medication Review    The patient is being seen at home due to physical debility that presents a taxing effort to leave the home, to mitigate high risk of hospital readmission or due to the limited availability of reliable or safe options for transportation to the point of access to the provider. Prior to treatment on this visit the chart was reviewed and patient consent was obtained.    Chief Complaint: Follow-up for chronic medical conditions and medication review    Today:  Mr. Kodi Ribeiro is a 70 y.o. male is being seen today for follow-up for chronic medical conditions and medication review. Kodi presents at baseline state of health as reported by patient and caregiver. VSS.  Bowel sounds + above pouch.. Denies any acute issues, concerns or complaints to address on today's visit. Reports taking all medications as prescribed. No other needs identified at this time.   [facility] NP to follow up in 4-6 weeks or as needed.    ROS:   Review of Systems   Constitutional: Negative for chills and fever.   HENT: Negative.    Eyes: Negative.    Respiratory: Negative.  Negative for cough and shortness of breath.    Cardiovascular: Negative for chest pain.   Gastrointestinal: Negative.  Negative for abdominal distention, abdominal pain, constipation, diarrhea and nausea.        Stool to colostomy pouch of normal consistency and color since discharge, increase in flatulence   Endocrine: Negative.    Genitourinary: Negative.    Musculoskeletal: Positive for gait problem.        Amputee   Skin: Negative.         Nini-colostomy skin intact,clean and dry   Allergic/Immunologic: Negative.    Hematological: Negative.     Psychiatric/Behavioral: Negative.    All other systems reviewed and are negative.    Assessments:  · Environmental: single story home, no steps to enter, adequate lighting and temeprature control  · Functional Status: Independent with ADL's/IADL's, ambulates with assistance of a cane/walker, continent of bowel and bladder  · Safety: Fall Precautions, Oxygen Use PRN  · Nutritional: Adequate  · Home Health: [facility]  · DME/Supplies: wheelchair, rollator walker, shower bench, oxygen tanks/concentrator     Objective:     Vitals:    08/28/20 1500   BP: 135/62   Pulse: 68   Resp: 18   Temp: 98.1 °F (36.7 °C)   TempSrc: Temporal   SpO2: 97%   PainSc: 0-No pain     There is no height or weight on file to calculate BMI.    Physical Exam  Vitals signs reviewed.   Constitutional:       Appearance: He is well-developed.   HENT:      Head: Normocephalic and atraumatic.   Eyes:      Pupils: Pupils are equal, round, and reactive to light.   Neck:      Musculoskeletal: Normal range of motion and neck supple.      Trachea: No tracheal deviation.   Cardiovascular:      Rate and Rhythm: Normal rate and regular rhythm.      Heart sounds: Murmur present.   Pulmonary:      Effort: Pulmonary effort is normal. No respiratory distress.      Breath sounds: Normal breath sounds.   Abdominal:      General: Abdomen is flat. Bowel sounds are normal.      Palpations: Abdomen is soft.      Tenderness: There is abdominal tenderness (kendall stomal) in the right lower quadrant. There is guarding.          Comments: Bowel sounds wnl above colostomy pouch  Soft, light brown stool noted to bag   Musculoskeletal: Normal range of motion.      Comments: Bilateral amputee   Lymphadenopathy:      Cervical: No cervical adenopathy.   Skin:     General: Skin is warm and dry.      Capillary Refill: Capillary refill takes less than 2 seconds.   Neurological:      Mental Status: He is alert and oriented to person, place, and time.   Psychiatric:          Behavior: Behavior normal.         Judgment: Judgment normal.       Assessment:     Medical Follow-Up and Medication Review    Plan:     Ethical / Legal: Advance Care Planning   Capacity to make medical decisions:  yes, Conflict no  · Surrogate decision maker:  Name Laury Ross, Relationship: wife  · Advance Directives:  none  · HCPOA: none  · [place]:  none  · Code Status:  Full    Advanced Care Directives and HCPOA forms left in the home for family review, discussion and signing with instructions to return upon their next provider encounter for inclusion to the medical record.   [place] Post form was signed by the patient and will be sent to medical records for inclusion into the medical record.  Kodi was seen today for follow-up.  Diagnoses and all orders for this visit:     Encounter for Medical Follow-Up and Medication Review   - [facility] Nurse Practitioner to schedule home visit with patient in 4-6 weeks or PRN    Were controlled substances prescribed?  No    Follow Up Appointments:   Future Appointments   Date Time Provider Department Center   8/31/2020  3:00 PM CHAIR 06 [facility] INFSN [facility]   9/2/2020  9:40 AM LABORATORY, [facility]   9/2/2020  9:50 AM SPECIMEN, [facility] SPECLAB [facility]   9/2/2020 10:15 AM [facility] US2 [facility] ULSOUND [facility]   9/9/2020  3:00 PM Brenton Jacobson MD ON NEPHRO  [facility]   10/5/2020  9:00 AM Leonardo Yang MD [facility] CLRCSR [facility]     Attestation: Screening criteria to assess the level of the patient's risk for infection with COVID-19 as recommended by the CDC at the time of the above documented home visit concluded appropriateness to proceed. Universal precautions were maintained at all times, including provider use of >60% alcohol gel hand  immediately prior to entry and upon departing the patient's home as well as cleaning of equipment used in home visit with a bacterial/germicidal disposable wipes.    Signature:    Porter Simon,  MSN, APRN, FNP-C  [facility]    Total face-to-face time was 40 min, >50% of this was spent on counseling and coordination of care. The following issues were discussed: primary and secondary diagnoses, co-morbidities, prescribed medications, treatment modalities, importance of compliance with medical advice and directives for follow-up care.

## 2020-08-29 NOTE — PATIENT INSTRUCTIONS
- [facility] to schedule follow-up visit with patient in 4-6 weeks or as needed.  - Continue all medications, treatments and therapies as ordered.   - Follow all instructions, recommendations as discussed.  - Maintain Safety Precautions at all times.  - Attend all medical appointments as scheduled.  - For worsening symptoms: call Primary Care Physician or Nurse Practitioner.  - For emergencies, call 911 or immediately report to the nearest emergency room.  - Limit Risks of environmental exposure to coronavirus as discussed including: social distancing, hand hygiene, and limiting departures from the home for necessities only.     Your care is important to us. If your provider recommended a follow-up appointment or test, we are happy to help you coordinate your recommended care. It is important that you complete your recommended follow-up. If you need help scheduling, please call [phone]. Appointments can also be made online through the patient portal.  While scheduling and attending your appointments is your responsibility, our goal is to support and empower you throughout that process.    [facility] Care Line - 24/7 Assistance  Unless otherwise directed by your provider, please contact [facility], our nurse care line that is available for 24/7 assistance.  Registered nurses in the [facility] provide: appointment scheduling, clinical advisement, health education, and other advisory services.  Call: [phone] (toll free)    COVID-19 Prevention   Avoid close contact with people and stay home if youre sick, except to get medical care.   Cover coughs and sneezes with a tissue, or use the inside of your elbow. Immediately wash your hands or use hand .  For more information, see CDC link below:  https://www.cdc.gov/coronavirus/2019-ncov/hcp/guidance-prevent-spread.html#precautions     The following information is provided to all patients.  This information is  to help you find resources for any of the problems found today that may be affecting your health:            Living healthy guide: www.[facility].[place].[facility]    Understanding Diabetes: www.diabetes.org   Eating healthy: www.cdc.gov/healthyweight   CDC home safety checklist: www.cdc.gov/steadi/patient.html  Agency on Aging: www.goea.[place].[facility]    Alcoholics anonymous (AA): www.aa.org   Physical Activity: www.leon.nih.gov/es1oiuw    Tobacco use: www.quitwithusla.org

## 2020-08-31 ENCOUNTER — INFUSION (OUTPATIENT)
Dept: INFUSION THERAPY | Facility: HOSPITAL | Age: 70
End: 2020-08-31
Attending: INTERNAL MEDICINE
Payer: MEDICARE

## 2020-08-31 VITALS
OXYGEN SATURATION: 97 % | DIASTOLIC BLOOD PRESSURE: 70 MMHG | SYSTOLIC BLOOD PRESSURE: 161 MMHG | RESPIRATION RATE: 18 BRPM | HEART RATE: 68 BPM | TEMPERATURE: 98 F

## 2020-08-31 DIAGNOSIS — D50.0 IRON DEFICIENCY ANEMIA DUE TO CHRONIC BLOOD LOSS: Primary | ICD-10-CM

## 2020-08-31 PROCEDURE — 25000003 PHARM REV CODE 250: Mod: HCNC | Performed by: INTERNAL MEDICINE

## 2020-08-31 PROCEDURE — 96365 THER/PROPH/DIAG IV INF INIT: CPT | Mod: HCNC

## 2020-08-31 PROCEDURE — 63600175 PHARM REV CODE 636 W HCPCS: Mod: JG,HCNC | Performed by: INTERNAL MEDICINE

## 2020-08-31 RX ADMIN — FERRIC CARBOXYMALTOSE INJECTION 750 MG: 50 INJECTION, SOLUTION INTRAVENOUS at 03:08

## 2020-08-31 RX ADMIN — SODIUM CHLORIDE: 9 INJECTION, SOLUTION INTRAVENOUS at 03:08

## 2020-08-31 NOTE — DISCHARGE INSTRUCTIONS
[facility]  [address] [facility]  [address] [address]  [phone] phone     [phone] fax  Hours of Operation: Monday- Friday 8:00am- 5:00pm  After hours phone  [phone]  Hematology / Oncology Physicians on call      Dr. Gaurang Crane, MILENA Mason NP Tyesha Taylor, NP    Please call with any concerns regarding your appointment today.FALL PREVENTION   Falls often occur due to slipping, tripping or losing your balance. Here are ways to reduce your risk of falling again.   Was there anything that caused your fall that can be fixed, removed or replaced?   Make your home safe by keeping walkways clear of objects you may trip over.   Use non-slip pads under rugs.   Do not walk in poorly lit areas.   Do not stand on chairs or wobbly ladders.   Use caution when reaching overhead or looking upward. This position can cause a loss of balance.   Be sure your shoes fit properly, have non-slip bottoms and are in good condition.   Be cautious when going up and down stairs, curbs, and when walking on uneven sidewalks.   If your balance is poor, consider using a cane or walker.   If your fall was related to alcohol use, stop or limit alcohol intake.   If your fall was related to use of sleeping medicines, talk to your doctor about this. You may need to reduce your dosage at bedtime if you awaken during the night to go to the bathroom.   To reduce the need for nighttime bathroom trips:   Avoid drinking fluids for several hours before going to bed   Empty your bladder before going to bed   Men can keep a urinal at the bedside   © 5383-0346 [employer] [facility], [address] [place] [address], [place] [address]. All rights reserved. This information is not intended as a substitute for professional medical care. Always follow your healthcare professional's instructions.  Support Groups/Classes    Support groups  "and classes are being offered at the   [facility] and [facility]!!    "Cooking with Cancer" (Nutrition Class):  Second Wednesday of each month   at noon at the Cancer Center.  Metastatic Support Group:  Third Tuesday of each month   at noon at the [facility].  Next Steps Class/Group: Second and fourth Thursday of each month at noon at the [facility].  [facility] (Breast Cancer Support Group): First Tuesday of each month   at 5:30pm at the [facility] location.  [employer]: [facility]: Second and third Tuesday of each month from 7:30am - 2pm.  [facility]: First and fourth Tuesday of each month from 7:30am - 2pm    If you are interested in attending or would like more information please ask our social workers or your nurse!  Iron-Deficiency Anemia (Adult)  Red blood cells carry oxygen to the tissues of your body. Anemia is a condition in which you have too few red blood cells. You need iron to make red cells. Anemia makes you feel tired and run down. When anemia becomes severe, your skin becomes pale. You may feel short of breath after physical activity. Other symptoms include:  · Headaches  · Dizziness  · Leg cramps with physical activity  · Drowsiness  · Restless legs  Your anemia is caused by not having enough iron in your body. This may be because of:  · Loss of blood. This can be caused by heavy menstrual periods. It can also be caused by bleeding from the stomach or intestines.  · Poor diet. You may not be eating enough foods that contain iron.  · Inability to absorb iron from the foods you eat  · Pregnancy  If your blood count is low enough, your healthcare provider may prescribe an iron supplement. It usually takes about 2 to 3 months of treatment with iron supplements to correct anemia. Severe cases of anemia need a blood transfusion to quickly ease symptoms and deliver more oxygen to the cells.  Home care  Follow these guidelines when caring for yourself at home:  · Eat foods high " in iron. This will boost the amount of iron stored in your body. It is a natural way to build up the number of blood cells. Good sources of iron include beef, liver, spinach and other dark green leafy vegetables, whole grains, beans, and nuts.  · Do not overexert yourself.  · Talk with your healthcare provider before traveling by air or traveling to high altitudes.  Follow-up care  Follow up with your healthcare provider in 2 months, or as advised. This is to have another red blood cell count to be sure your anemia has been fixed.  When to seek medical advice  Call your healthcare provider right away if any of these occur:  · Shortness of breath or chest pain  · Dizziness or fainting  · Vomiting blood or passing red or black-colored stool   Date Last Reviewed: 2/25/2016  © 4173-9843 [employer]. [address] [place] [address], [place] [address]. All rights reserved. This information is not intended as a substitute for professional medical care. Always follow your healthcare professional's instructions.

## 2020-08-31 NOTE — PLAN OF CARE
Problem: Adult Inpatient Plan of Care  Goal: Plan of Care Review  Outcome: Ongoing, Progressing  Goal: Patient-Specific Goal (Individualization)  Outcome: Ongoing, Progressing   Pt reported feeling great and no complaints .

## 2020-09-01 ENCOUNTER — TELEPHONE (OUTPATIENT)
Dept: RADIOLOGY | Facility: HOSPITAL | Age: 70
End: 2020-09-01

## 2020-09-02 ENCOUNTER — HOSPITAL ENCOUNTER (OUTPATIENT)
Dept: RADIOLOGY | Facility: HOSPITAL | Age: 70
Discharge: HOME | End: 2020-09-02
Attending: INTERNAL MEDICINE
Payer: MEDICARE

## 2020-09-02 DIAGNOSIS — N18.4 CKD STAGE G4/A3, GFR 15-29 AND ALBUMIN CREATININE RATIO >300 MG/G: ICD-10-CM

## 2020-09-02 DIAGNOSIS — N17.9 AKI (ACUTE KIDNEY INJURY): ICD-10-CM

## 2020-09-02 PROCEDURE — 76770 US RETROPERITONEAL COMPLETE: ICD-10-PCS | Mod: 26,HCNC,, | Performed by: RADIOLOGY

## 2020-09-02 PROCEDURE — 76770 US EXAM ABDO BACK WALL COMP: CPT | Mod: TC,HCNC

## 2020-09-02 PROCEDURE — 76770 US EXAM ABDO BACK WALL COMP: CPT | Mod: 26,HCNC,, | Performed by: RADIOLOGY

## 2020-09-08 ENCOUNTER — PATIENT OUTREACH (OUTPATIENT)
Dept: ADMINISTRATIVE | Facility: OTHER | Age: 70
End: 2020-09-08

## 2020-09-08 NOTE — PROGRESS NOTES
Health Maintenance Due   Topic Date Due    Shingles Vaccine (2 of 3) 03/07/2012    Influenza Vaccine (1) 08/01/2020     Updates were requested from care everywhere.  Chart was reviewed for overdue [facility]active [facility] ([facility]) topics (CRS, Breast Cancer Screening, Eye exam)  Health Maintenance has been updated.  [place] immunization registry triggered.  Immunizations were not reconciled due to LINKS not responding.

## 2020-09-09 ENCOUNTER — INFUSION (OUTPATIENT)
Dept: INFUSION THERAPY | Facility: HOSPITAL | Age: 70
End: 2020-09-09
Attending: INTERNAL MEDICINE
Payer: MEDICARE

## 2020-09-09 ENCOUNTER — OFFICE VISIT (OUTPATIENT)
Dept: NEPHROLOGY | Facility: CLINIC | Age: 70
End: 2020-09-09
Payer: MEDICARE

## 2020-09-09 VITALS
DIASTOLIC BLOOD PRESSURE: 60 MMHG | RESPIRATION RATE: 16 BRPM | HEIGHT: 73 IN | OXYGEN SATURATION: 97 % | BODY MASS INDEX: 23.93 KG/M2 | DIASTOLIC BLOOD PRESSURE: 71 MMHG | SYSTOLIC BLOOD PRESSURE: 150 MMHG | TEMPERATURE: 99 F | SYSTOLIC BLOOD PRESSURE: 147 MMHG | WEIGHT: 180.56 LBS | HEART RATE: 75 BPM | HEART RATE: 88 BPM

## 2020-09-09 DIAGNOSIS — D63.1 ANEMIA OF CHRONIC RENAL FAILURE, STAGE 4 (SEVERE): ICD-10-CM

## 2020-09-09 DIAGNOSIS — Q63.1 HORSESHOE KIDNEY: ICD-10-CM

## 2020-09-09 DIAGNOSIS — N17.9 AKI (ACUTE KIDNEY INJURY): ICD-10-CM

## 2020-09-09 DIAGNOSIS — N18.4 CKD STAGE G4/A3, GFR 15-29 AND ALBUMIN CREATININE RATIO >300 MG/G: Primary | ICD-10-CM

## 2020-09-09 DIAGNOSIS — N18.4 ANEMIA OF CHRONIC RENAL FAILURE, STAGE 4 (SEVERE): ICD-10-CM

## 2020-09-09 DIAGNOSIS — E55.9 VITAMIN D DEFICIENCY: ICD-10-CM

## 2020-09-09 DIAGNOSIS — I10 ESSENTIAL HYPERTENSION: ICD-10-CM

## 2020-09-09 DIAGNOSIS — N25.81 HYPERPARATHYROIDISM, SECONDARY RENAL: ICD-10-CM

## 2020-09-09 DIAGNOSIS — I73.9 PVD (PERIPHERAL VASCULAR DISEASE): ICD-10-CM

## 2020-09-09 DIAGNOSIS — D50.0 IRON DEFICIENCY ANEMIA DUE TO CHRONIC BLOOD LOSS: Primary | ICD-10-CM

## 2020-09-09 DIAGNOSIS — N13.5 URETERAL STRICTURE, LEFT: ICD-10-CM

## 2020-09-09 DIAGNOSIS — R80.1 PERSISTENT PROTEINURIA: ICD-10-CM

## 2020-09-09 PROCEDURE — 99999 PR PBB SHADOW E&M-EST. PATIENT-LVL IV: ICD-10-PCS | Mod: PBBFAC,HCNC,, | Performed by: INTERNAL MEDICINE

## 2020-09-09 PROCEDURE — 99214 PR OFFICE/OUTPT VISIT, EST, LEVL IV, 30-39 MIN: ICD-10-PCS | Mod: HCNC,S$GLB,, | Performed by: INTERNAL MEDICINE

## 2020-09-09 PROCEDURE — 1101F PR PT FALLS ASSESS DOC 0-1 FALLS W/OUT INJ PAST YR: ICD-10-PCS | Mod: HCNC,CPTII,S$GLB, | Performed by: INTERNAL MEDICINE

## 2020-09-09 PROCEDURE — 96365 THER/PROPH/DIAG IV INF INIT: CPT | Mod: HCNC

## 2020-09-09 PROCEDURE — 1159F PR MEDICATION LIST DOCUMENTED IN MEDICAL RECORD: ICD-10-PCS | Mod: HCNC,S$GLB,, | Performed by: INTERNAL MEDICINE

## 2020-09-09 PROCEDURE — 63600175 PHARM REV CODE 636 W HCPCS: Mod: JG,HCNC | Performed by: INTERNAL MEDICINE

## 2020-09-09 PROCEDURE — 1126F PR PAIN SEVERITY QUANTIFIED, NO PAIN PRESENT: ICD-10-PCS | Mod: HCNC,S$GLB,, | Performed by: INTERNAL MEDICINE

## 2020-09-09 PROCEDURE — 1101F PT FALLS ASSESS-DOCD LE1/YR: CPT | Mod: HCNC,CPTII,S$GLB, | Performed by: INTERNAL MEDICINE

## 2020-09-09 PROCEDURE — 3008F PR BODY MASS INDEX (BMI) DOCUMENTED: ICD-10-PCS | Mod: HCNC,CPTII,S$GLB, | Performed by: INTERNAL MEDICINE

## 2020-09-09 PROCEDURE — 3008F BODY MASS INDEX DOCD: CPT | Mod: HCNC,CPTII,S$GLB, | Performed by: INTERNAL MEDICINE

## 2020-09-09 PROCEDURE — 3078F DIAST BP <80 MM HG: CPT | Mod: HCNC,CPTII,S$GLB, | Performed by: INTERNAL MEDICINE

## 2020-09-09 PROCEDURE — 99499 UNLISTED E&M SERVICE: CPT | Mod: HCNC,S$GLB,, | Performed by: INTERNAL MEDICINE

## 2020-09-09 PROCEDURE — 25000003 PHARM REV CODE 250: Mod: HCNC | Performed by: INTERNAL MEDICINE

## 2020-09-09 PROCEDURE — 3078F PR MOST RECENT DIASTOLIC BLOOD PRESSURE < 80 MM HG: ICD-10-PCS | Mod: HCNC,CPTII,S$GLB, | Performed by: INTERNAL MEDICINE

## 2020-09-09 PROCEDURE — 3074F PR MOST RECENT SYSTOLIC BLOOD PRESSURE < 130 MM HG: ICD-10-PCS | Mod: HCNC,CPTII,S$GLB, | Performed by: INTERNAL MEDICINE

## 2020-09-09 PROCEDURE — 99499 RISK ADDL DX/OHS AUDIT: ICD-10-PCS | Mod: HCNC,S$GLB,, | Performed by: INTERNAL MEDICINE

## 2020-09-09 PROCEDURE — 99999 PR PBB SHADOW E&M-EST. PATIENT-LVL IV: CPT | Mod: PBBFAC,HCNC,, | Performed by: INTERNAL MEDICINE

## 2020-09-09 PROCEDURE — 1159F MED LIST DOCD IN RCRD: CPT | Mod: HCNC,S$GLB,, | Performed by: INTERNAL MEDICINE

## 2020-09-09 PROCEDURE — 99214 OFFICE O/P EST MOD 30 MIN: CPT | Mod: HCNC,S$GLB,, | Performed by: INTERNAL MEDICINE

## 2020-09-09 PROCEDURE — 1126F AMNT PAIN NOTED NONE PRSNT: CPT | Mod: HCNC,S$GLB,, | Performed by: INTERNAL MEDICINE

## 2020-09-09 PROCEDURE — 3074F SYST BP LT 130 MM HG: CPT | Mod: HCNC,CPTII,S$GLB, | Performed by: INTERNAL MEDICINE

## 2020-09-09 RX ADMIN — FERRIC CARBOXYMALTOSE INJECTION 750 MG: 50 INJECTION, SOLUTION INTRAVENOUS at 12:09

## 2020-09-09 RX ADMIN — SODIUM CHLORIDE: 0.9 INJECTION, SOLUTION INTRAVENOUS at 12:09

## 2020-09-09 NOTE — DISCHARGE INSTRUCTIONS
[facility]  [address] [facility]  [address]  [phone] phone     [phone] fax  Hours of Operation: Monday- Friday 8:00am- 5:00pm  After hours phone  [phone]  Hematology / Oncology Physicians on call      Dr. Gaurang Crane, MILENA Carreon, MILENA Babcock NP    IRON DEFICIENCY ANEMIA:  Anemia is a condition where the size or number of red blood cells in the body is reduced. Iron is needed in the diet to make red cells. The red blood cells carry oxygen to all parts of the body. Anemia limits the delivery of oxygen to where it is needed. This causes a feeling of being tired and run down. When anemia becomes severe, the skin becomes pale and there is shortness of breath with exertion, headaches, dizziness, drowsiness and fatigue.  The cause of your anemia is lack of iron in your body. This may occur due to blood loss (for example, heavy menstrual periods or bleeding from the stomach or intestines) or a poor diet (not eating enough iron-containing foods), inability to absorb iron from your diet, or pregnancy.  If the blood count is low enough, an IRON SUPPLEMENT will be prescribed. It usually takes about 2-3 months of treatment with iron supplements to correct an anemia. Severe cases of anemia requires a blood transfusion to rapidly correct symptoms and deliver more oxygen to the cells.  HOME CARE:  1) Increase the iron stores in your body by eating foods high in iron content. This is a natural way of building your blood cells back up again. Beef, liver, spinach and other dark green leafy vegetables, whole grain products, beans and nuts are all natural sources of iron.  2) If you are having symptoms of anemia listed above:  -- Do not overexert yourself.  -- Talk to your doctor before flying on an airplane or travelling to high altitudes.  FOLLOW UP with your doctor in 2  months for a repeat red blood cell count, or as recommended by our staff, to be sure that the anemia has been corrected.  GET PROMPT MEDICAL ATTENTION if any of the following occur or worsen:  -- Shortness of breath or chest pain  -- Dizziness or fainting  -- Vomiting blood or passing red or black-colored stool  © 4064-6905 [employer] [facility], [address] [place] [address], [place] [address]. All rights reserved. This information is not intended as a substitute for professional medical care. Always follow your healthcare professional's instructions.      Please call with any concerns regarding your appointment today.

## 2020-09-09 NOTE — PLAN OF CARE
Pt will tolerate iron infusion well today and will begin to feel better.  Pt will be free of falls.

## 2020-09-09 NOTE — PROGRESS NOTES
Subjective:       Patient ID: Kodi Ribeiro is a 70 y.o. White male who presents for follow-up evaluation of Acute Renal Failure and Chronic Kidney Disease    Hypertension  Pertinent negatives include no chest pain, headaches, neck pain, palpitations or shortness of breath.        Patient is a 70-year-old male with longstanding history of hypertension and peripheral arterial disease with multiple complications including AAA.  Patient had a AAA repair.  Also had complications of left-sided ureteric stricture.  Patient has a history of horseshoe kidney.  The 2 kidneys were  .  He chronically has left-sided ureteric stent replaced by Dr. Jin yearly.  Majority of his kidney function comes from the right kidney.  Left kidney seems to have minimal kidney function.  Has had complications of pyelonephritis on the left kidney.  Patient has severe peripheral artery disease status post a right BKA and left foot amputation.  He has quit smoking.  His baseline creatinine has been ranging between 1.4 and 1.5.  He has been seen in the nephrology division back in the year 2014.  Lately he is having chest pain off and on and is being considered for cardiac catheterization.  His creatinine was found to have gone up from 1 0.4-1.5 as baseline up to 2.6 earlier this month of January 2019.  Repeat labs show creatinine of 2.3.    January 2019 patient seen for acute kidney injury on chronic kidney disease.  Losartan stop.  Screening for ischemic nephropathy.  Avoid nonsteroidals.  Patient has been taking ibuprofen.    February 2019.  Ultrasound reviewed.  No definite of  renal artery stenosis.  Heavy proteinuria.  GFR 25% with creatinine of 2.4.  .  Vitamin-D 6, anemia      May 2020 patient comes back for follow-up.  Multiple episodes of acute kidney injury with small bowel obstruction obstructive nephropathy , CHF and infections.  All records reviewed.  Last creatinine we have is from April with a creatinine of  "2.2.  Approximate GFR 20-25%.  Cystatin C showed GFR 23%. ( says iron pill causes SBO) ; left testicular hematoma in 4 2020 --is better     June 2020 colonoscopy showed colon cancer status post partial colectomy by        August 2020 patient now has a diagnosis of adenocarcinoma of the colon.  Have some lung nodules suspicious for metastatic disease.  S/p 8/6/2020 PET scan.  Creatinine is up to 2.6 with acute kidney injury.    September 2020 comes back for follow-up.  Ultrasound reviewed.  Proteinuria 2.2 g.  Creatinine down to 2.2 off Lasix.  No weight loss no chest Pain no SOB       Review of Systems   Constitutional: Negative.  Negative for activity change, appetite change, chills, diaphoresis, fatigue and fever.   HENT: Negative.  Negative for congestion and trouble swallowing.    Eyes: Negative.    Respiratory: Negative for cough, chest tightness, shortness of breath and wheezing.    Cardiovascular: Negative for chest pain, palpitations and leg swelling.   Gastrointestinal: Negative.  Negative for abdominal distention, abdominal pain, nausea and vomiting.   Genitourinary: Negative.  Negative for decreased urine volume, difficulty urinating, dysuria, enuresis, flank pain, frequency, hematuria, penile swelling, scrotal swelling and urgency.   Musculoskeletal: Negative.  Negative for arthralgias, back pain, joint swelling and neck pain.   Skin: Negative for rash.   Neurological: Negative.  Negative for tremors, seizures and headaches.   Psychiatric/Behavioral: Negative.  Negative for confusion and sleep disturbance. The patient is not nervous/anxious.        Objective:   BP (!) 150/60   Pulse 88   Ht 6' 1" (1.854 m)   Wt 81.9 kg (180 lb 8.9 oz)   BMI 23.82 kg/m²      Physical Exam  Vitals signs and nursing note reviewed.   Constitutional:       General: He is not in acute distress.     Appearance: He is well-developed.   HENT:      Head: Normocephalic.   Eyes:      Pupils: Pupils are equal, round, and " reactive to light.   Neck:      Musculoskeletal: Normal range of motion and neck supple.      Thyroid: No thyromegaly.      Vascular: No JVD.   Cardiovascular:      Rate and Rhythm: Normal rate and regular rhythm.      Chest Wall: PMI is not displaced.      Heart sounds: S1 normal and S2 normal. Murmur present. No friction rub. No gallop.    Pulmonary:      Effort: Pulmonary effort is normal. No respiratory distress.      Breath sounds: Decreased breath sounds present. No wheezing or rales.   Chest:      Chest wall: No tenderness.   Abdominal:      General: Bowel sounds are normal. There is no distension.      Palpations: Abdomen is soft. There is no mass.      Tenderness: There is no abdominal tenderness. There is no rebound.      Hernia: No hernia is present.      Comments: RLQ colostomy    Musculoskeletal: Normal range of motion.         General: No tenderness.      Comments: Right BKA.  Left foot amputation.  Peripheral arterial disease   Lymphadenopathy:      Cervical: No cervical adenopathy.   Skin:     General: Skin is warm and dry.      Capillary Refill: Capillary refill takes less than 2 seconds.      Findings: No erythema or rash.   Neurological:      Mental Status: He is alert and oriented to person, place, and time. He is not disoriented.      Cranial Nerves: No cranial nerve deficit.      Motor: No abnormal muscle tone.      Coordination: Coordination normal.      Deep Tendon Reflexes: Reflexes are normal and symmetric. Reflexes normal.   Psychiatric:         Behavior: Behavior normal.         Thought Content: Thought content normal.         Judgment: Judgment normal.           Lab Results   Component Value Date    CREATININE 2.2 (H) 09/02/2020    BUN 32 (H) 09/02/2020     09/02/2020    K 4.8 09/02/2020     (H) 09/02/2020    CO2 17 (L) 09/02/2020     Lab Results   Component Value Date    WBC 5.13 09/02/2020    HGB 10.1 (L) 09/02/2020    HCT 32.2 (L) 09/02/2020    MCV 86 09/02/2020      09/02/2020     Lab Results   Component Value Date    .9 (H) 09/02/2020    CALCIUM 9.0 09/02/2020    PHOS 3.3 09/02/2020     @RESUFAST(URICACID)    Assessment:    )    1. CKD stage G4/A3, GFR 15-29 and albumin creatinine ratio >300 mg/g    2. MARCELA (acute kidney injury)    3. Ureteral stricture, left    4. Horseshoe kidney    5. PVD (peripheral vascular disease)    6. Essential hypertension    7. Persistent proteinuria    8. Anemia of chronic renal failure, stage 4 (severe)    9. Vitamin D deficiency    10. Hyperparathyroidism, secondary renal        Plan:         1.  Acute kidney injury on Chronic kidney disease stage 4:  Multiple episodes of obstructive nephropathy and small bowel obstruction.   Likely hypertensive nephropathy.  GFR 20- 25%.  Cystatin C shows GFR 23%.  Heavy proteinuria.  Likely due to hypertensive nephropathy.     Creatinine is slightly better off Lasix.  Ultrasound reviewed with mild to moderate hydronephrosis ( stent in place ) .      3.  Nonfunctional left kidney with ureteric stricture status post stent placement in 2/2020 chronic obstruction . Repeat USD before follow up Follow-up with Dr. Jin    4.  Essential hypertension:  Blood pressure target would be 130.     5.  Heavy proteinuria most likely hypertensive nephropathy:  Due to advanced kidney failure and recurrent acute kidney injury I would avoid ARB    6.  Anemia due to chronic kidney disease:  S/p Consult Hematology , all records reviewed    7.  Secondary hyperparathyroidism with severe vitamin-D deficiency:   ergocalciferol weekly.  Surveillance for hyperphosphatemia and hypocalcemia will be continued.          Follow-up 2-3 months

## 2020-09-10 ENCOUNTER — OFFICE VISIT (OUTPATIENT)
Dept: FAMILY MEDICINE | Facility: CLINIC | Age: 70
End: 2020-09-10
Payer: MEDICARE

## 2020-09-10 VITALS
TEMPERATURE: 99 F | DIASTOLIC BLOOD PRESSURE: 80 MMHG | OXYGEN SATURATION: 98 % | WEIGHT: 178.44 LBS | RESPIRATION RATE: 20 BRPM | SYSTOLIC BLOOD PRESSURE: 138 MMHG | HEART RATE: 76 BPM | HEIGHT: 73 IN | BODY MASS INDEX: 23.65 KG/M2

## 2020-09-10 DIAGNOSIS — I10 ESSENTIAL HYPERTENSION: Primary | Chronic | ICD-10-CM

## 2020-09-10 DIAGNOSIS — C18.9 COLON ADENOCARCINOMA: ICD-10-CM

## 2020-09-10 DIAGNOSIS — J44.9 CHRONIC OBSTRUCTIVE PULMONARY DISEASE, UNSPECIFIED COPD TYPE: ICD-10-CM

## 2020-09-10 DIAGNOSIS — E78.2 MIXED HYPERLIPIDEMIA: Chronic | ICD-10-CM

## 2020-09-10 DIAGNOSIS — D50.0 IRON DEFICIENCY ANEMIA DUE TO CHRONIC BLOOD LOSS: ICD-10-CM

## 2020-09-10 PROCEDURE — 3075F PR MOST RECENT SYSTOLIC BLOOD PRESS GE 130-139MM HG: ICD-10-PCS | Mod: HCNC,CPTII,S$GLB, | Performed by: FAMILY MEDICINE

## 2020-09-10 PROCEDURE — 99999 PR PBB SHADOW E&M-EST. PATIENT-LVL IV: CPT | Mod: PBBFAC,HCNC,, | Performed by: FAMILY MEDICINE

## 2020-09-10 PROCEDURE — 1101F PR PT FALLS ASSESS DOC 0-1 FALLS W/OUT INJ PAST YR: ICD-10-PCS | Mod: HCNC,CPTII,S$GLB, | Performed by: FAMILY MEDICINE

## 2020-09-10 PROCEDURE — 1126F AMNT PAIN NOTED NONE PRSNT: CPT | Mod: HCNC,S$GLB,, | Performed by: FAMILY MEDICINE

## 2020-09-10 PROCEDURE — 1159F MED LIST DOCD IN RCRD: CPT | Mod: HCNC,S$GLB,, | Performed by: FAMILY MEDICINE

## 2020-09-10 PROCEDURE — 1101F PT FALLS ASSESS-DOCD LE1/YR: CPT | Mod: HCNC,CPTII,S$GLB, | Performed by: FAMILY MEDICINE

## 2020-09-10 PROCEDURE — 3079F PR MOST RECENT DIASTOLIC BLOOD PRESSURE 80-89 MM HG: ICD-10-PCS | Mod: HCNC,CPTII,S$GLB, | Performed by: FAMILY MEDICINE

## 2020-09-10 PROCEDURE — 3008F BODY MASS INDEX DOCD: CPT | Mod: HCNC,CPTII,S$GLB, | Performed by: FAMILY MEDICINE

## 2020-09-10 PROCEDURE — 3075F SYST BP GE 130 - 139MM HG: CPT | Mod: HCNC,CPTII,S$GLB, | Performed by: FAMILY MEDICINE

## 2020-09-10 PROCEDURE — 99999 PR PBB SHADOW E&M-EST. PATIENT-LVL IV: ICD-10-PCS | Mod: PBBFAC,HCNC,, | Performed by: FAMILY MEDICINE

## 2020-09-10 PROCEDURE — 1126F PR PAIN SEVERITY QUANTIFIED, NO PAIN PRESENT: ICD-10-PCS | Mod: HCNC,S$GLB,, | Performed by: FAMILY MEDICINE

## 2020-09-10 PROCEDURE — 99499 UNLISTED E&M SERVICE: CPT | Mod: HCNC,S$GLB,, | Performed by: FAMILY MEDICINE

## 2020-09-10 PROCEDURE — 3079F DIAST BP 80-89 MM HG: CPT | Mod: HCNC,CPTII,S$GLB, | Performed by: FAMILY MEDICINE

## 2020-09-10 PROCEDURE — 3008F PR BODY MASS INDEX (BMI) DOCUMENTED: ICD-10-PCS | Mod: HCNC,CPTII,S$GLB, | Performed by: FAMILY MEDICINE

## 2020-09-10 PROCEDURE — 99214 PR OFFICE/OUTPT VISIT, EST, LEVL IV, 30-39 MIN: ICD-10-PCS | Mod: HCNC,S$GLB,, | Performed by: FAMILY MEDICINE

## 2020-09-10 PROCEDURE — 1159F PR MEDICATION LIST DOCUMENTED IN MEDICAL RECORD: ICD-10-PCS | Mod: HCNC,S$GLB,, | Performed by: FAMILY MEDICINE

## 2020-09-10 PROCEDURE — 99499 RISK ADDL DX/OHS AUDIT: ICD-10-PCS | Mod: HCNC,S$GLB,, | Performed by: FAMILY MEDICINE

## 2020-09-10 PROCEDURE — 99214 OFFICE O/P EST MOD 30 MIN: CPT | Mod: HCNC,S$GLB,, | Performed by: FAMILY MEDICINE

## 2020-09-10 RX ORDER — TRIAMCINOLONE ACETONIDE 1 MG/G
CREAM TOPICAL 2 TIMES DAILY
Qty: 1 TUBE | Refills: 3 | Status: SHIPPED | OUTPATIENT
Start: 2020-09-10 | End: 2020-12-14 | Stop reason: SDUPTHER

## 2020-09-10 RX ORDER — ATORVASTATIN CALCIUM 40 MG/1
40 TABLET, FILM COATED ORAL DAILY
Qty: 90 TABLET | Refills: 3 | Status: SHIPPED | OUTPATIENT
Start: 2020-09-10 | End: 2021-01-01

## 2020-09-10 NOTE — PROGRESS NOTES
Chief Complaint:    Chief Complaint   Patient presents with    Follow-up       History of Present Illness:    Status post hospital follow-up was found to have adenocarcinoma of the colon metastatic post colostomy placement.  Following with oncology.  He had some lesions in the lung and was offered biopsy but he is not interested in biopsy at this point a repeat CT scan of the chest will be done in 3 months at which point palliative chemotherapy could be ordered if necessary  Blood pressure home it runs okay.  He does complain of itching that happen sometimes would like to get a prescription for triamcinolone cream which seemed to help mostly in the forearm.  COPD stable  Iron infusion with Hematology      ROS:  Review of Systems   Constitutional: Negative for activity change, chills, fatigue, fever and unexpected weight change.   HENT: Negative for congestion, ear discharge, ear pain, hearing loss, postnasal drip and rhinorrhea.    Eyes: Negative for pain and visual disturbance.   Respiratory: Negative for cough, chest tightness and shortness of breath.    Cardiovascular: Negative for chest pain and palpitations.   Gastrointestinal: Negative for abdominal pain, diarrhea and vomiting.   Endocrine: Negative for heat intolerance.   Genitourinary: Negative for dysuria, flank pain, frequency and hematuria.   Musculoskeletal: Negative for back pain, gait problem and neck pain.   Skin: Negative for color change and rash.   Neurological: Negative for dizziness, tremors, seizures, numbness and headaches.   Psychiatric/Behavioral: Negative for agitation, hallucinations, self-injury, sleep disturbance and suicidal ideas. The patient is not nervous/anxious.        Past Medical History:   Diagnosis Date    Acute on chronic congestive heart failure 1/13/2020    Acute respiratory failure with hypoxia 1/14/2020    Analgesic nephropathy     Anemia     AP (angina pectoris) 1/11/2019    Arthritis     Colon polyp     Repeat  colonoscopy due in     COPD exacerbation 2020    Coronary artery disease     Diverticulosis     colonoscopy 2014    Dysthymia 2020    Encounter for blood transfusion     GERD (gastroesophageal reflux disease)     Hemorrhoids     colonoscopy 2014    Horseshoe kidney     Hyperglycemia 3/17/2014    Hyperlipidemia     Hypertension     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    Late complications of amputation stump     rseolved with further amputation( MRSA then none since )    Lipoma of colon     colonoscopy 2014    Myocardial infarction     per patient  & 2012    Peripheral vascular disease     Phantom limb syndrome     patient reports only intermittent not problematic, not worsening    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    Spinal cord disease     L4L5 disc    Stroke     Tobacco dependence     resolved    Ureteral stent retained        Social History:  Social History     Socioeconomic History    Marital status:      Spouse name: Laury    Number of children: 2    Years of education: Not on file    Highest education level: Not on file   Occupational History    Occupation: Retired      Comment: [employer]   Social Needs    Financial resource strain: Not on file    Food insecurity     Worry: Not on file     Inability: Not on file    Transportation needs     Medical: Not on file     Non-medical: Not on file   Tobacco Use    Smoking status: Former Smoker     Packs/day: 1.00     Years: 15.00     Pack years: 15.00     Quit date: 2009     Years since quittin.6    Smokeless tobacco: Never Used   Substance and Sexual Activity    Alcohol use: No    Drug use: No     Comment: Is on prescription opiod, no non prescribed use    Sexual activity: Not Currently     Partners: Female   Lifestyle    Physical activity     Days per week: Not on file     Minutes per session: Not on file    Stress: Not on file  "  Relationships    Social connections     Talks on phone: Not on file     Gets together: Not on file     Attends Sikh service: Not on file     Active member of club or organization: Not on file     Attends meetings of clubs or organizations: Not on file     Relationship status: Not on file   Other Topics Concern    Not on file   Social History Narrative     . 4 children alive and well. 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  Still drives. Does not have a Living Will.       Family History:   family history includes COPD in his mother; Cancer in his mother; Diabetes in his daughter; Heart disease in his father.    Health Maintenance   Topic Date Due    Lipid Panel  04/15/2021    High Dose Statin  09/10/2021    TETANUS VACCINE  12/04/2024    Hepatitis C Screening  Completed    Pneumococcal Vaccine (65+ High/Highest Risk)  Completed    Abdominal Aortic Aneurysm Screening  Addressed       Physical Exam:    Vital Signs  Temp: 98.8 °F (37.1 °C)  Temp src: Temporal  Pulse: 76  Resp: 20  SpO2: 98 %  BP: (!) 148/70  Pain Score: 0-No pain  Height and Weight  Height: 6' 1" (185.4 cm)  Weight: 81 kg (178 lb 7.4 oz)  BSA (Calculated - sq m): 2.04 sq meters  BMI (Calculated): 23.6  Weight in (lb) to have BMI = 25: 189.1]    Body mass index is 23.55 kg/m².    Physical Exam  Constitutional:       Appearance: He is well-developed.   Eyes:      Conjunctiva/sclera: Conjunctivae normal.      Pupils: Pupils are equal, round, and reactive to light.   Neck:      Musculoskeletal: Normal range of motion and neck supple.   Cardiovascular:      Rate and Rhythm: Normal rate and regular rhythm.      Heart sounds: Normal heart sounds. No murmur.   Pulmonary:      Effort: Pulmonary effort is normal. No respiratory distress.      Breath sounds: Normal breath sounds. No wheezing or rales.   Chest:      Chest wall: No tenderness.   Abdominal:      General: There is no distension.      " Palpations: Abdomen is soft. There is no mass.      Tenderness: There is no abdominal tenderness. There is no guarding.   Musculoskeletal:         General: No tenderness.   Lymphadenopathy:      Cervical: No cervical adenopathy.   Skin:     General: Skin is warm and dry.   Neurological:      Mental Status: He is alert and oriented to person, place, and time.      Deep Tendon Reflexes: Reflexes are normal and symmetric.   Psychiatric:         Behavior: Behavior normal.         Thought Content: Thought content normal.         Judgment: Judgment normal.           Assessment:      ICD-10-CM ICD-9-CM   1. Essential hypertension  I10 401.9   2. Mixed hyperlipidemia  E78.2 272.2   3. Chronic obstructive pulmonary disease, unspecified COPD type  J44.9 496   4. Colon adenocarcinoma  C18.9 153.9   5. Iron deficiency anemia due to chronic blood loss  D50.0 280.0         Plan:    Continue follow with heme Onc on the colon cancer  Blood pressure stable  Hyperlipidemia stable  COPD stable  Continue iron infusion followed with Hematology  Follow-up 6 months or as needed    No orders of the defined types were placed in this encounter.      Current Outpatient Medications   Medication Sig Dispense Refill    albuterol-ipratropium (DUO-NEB) 2.5 mg-0.5 mg/3 mL nebulizer solution Take 3 mLs by nebulization every 8 (eight) hours as needed for Wheezing or Shortness of Breath. Rescue 90 mL 0    amLODIPine (NORVASC) 10 MG tablet TAKE 1 TABLET EVERY DAY 90 tablet 3    atorvastatin (LIPITOR) 40 MG tablet Take 1 tablet (40 mg total) by mouth once daily. 90 tablet 3    carvedilol (COREG) 25 MG tablet Take 1 tablet (25 mg total) by mouth 2 (two) times daily with meals. 60 tablet 11    cetirizine (ZYRTEC) 10 MG tablet Take 10 mg by mouth once daily.      folic acid-vit B6-vit B12 2.5-25-2 mg (FOLBIC OR EQUIV) 2.5-25-2 mg Tab Take 1 tablet by mouth once daily. 90 tablet 3    isosorbide mononitrate (IMDUR) 120 MG 24 hr tablet Take 1 tablet  (120 mg total) by mouth once daily. 90 tablet 3    mupirocin (BACTROBAN) 2 % ointment Apply topically 3 (three) times daily. 1 Tube 2    nitroglycerin (NITROSTAT) 0.6 MG Subl Place 1 tablet (0.6 mg total) under the tongue every 5 (five) minutes as needed (max 3/ per episode). 30 tablet 1    omeprazole (PRILOSEC) 20 MG capsule TAKE 1 CAPSULE TWICE DAILY 180 capsule 3    OXYGEN-AIR DELIVERY SYSTEMS MISC by [facility] [place]Non-Drug; Combo Route) route.      triamcinolone acetonide 0.1% (KENALOG) 0.1 % cream Apply topically 2 (two) times daily. 1 Tube 3    aspirin (ECOTRIN) 81 MG EC tablet Take 1 tablet (81 mg total) by mouth once daily.  0    hydrALAZINE (APRESOLINE) 25 MG tablet Take 1 tablet (25 mg total) by mouth every 12 (twelve) hours. 180 tablet 3    HYDROcodone-acetaminophen (NORCO) 7.5-325 mg per tablet Take 1 tablet by mouth every 8 (eight) hours as needed for Pain. (Patient not taking: Reported on 9/10/2020) 20 tablet 0    sertraline (ZOLOFT) 50 MG tablet Take 1 tablet (50 mg total) by mouth once daily. (Patient not taking: Reported on 9/10/2020) 90 tablet 11     No current facility-administered medications for this visit.        Medications Discontinued During This Encounter   Medication Reason    triamcinolone acetonide 0.1% (KENALOG) 0.1 % cream Reorder    atorvastatin (LIPITOR) 40 MG tablet Reorder       No follow-ups on file.      Norma Nuñez MD

## 2020-09-18 ENCOUNTER — PES CALL (OUTPATIENT)
Dept: ADMINISTRATIVE | Facility: CLINIC | Age: 70
End: 2020-09-18

## 2020-10-05 ENCOUNTER — OFFICE VISIT (OUTPATIENT)
Dept: SURGERY | Facility: CLINIC | Age: 70
End: 2020-10-05
Payer: MEDICARE

## 2020-10-05 ENCOUNTER — LAB VISIT (OUTPATIENT)
Dept: LAB | Facility: HOSPITAL | Age: 70
End: 2020-10-05
Attending: COLON & RECTAL SURGERY
Payer: MEDICARE

## 2020-10-05 VITALS
BODY MASS INDEX: 23.03 KG/M2 | TEMPERATURE: 98 F | SYSTOLIC BLOOD PRESSURE: 150 MMHG | DIASTOLIC BLOOD PRESSURE: 67 MMHG | WEIGHT: 173.75 LBS | HEIGHT: 73 IN | HEART RATE: 66 BPM

## 2020-10-05 DIAGNOSIS — C18.9 COLON ADENOCARCINOMA: ICD-10-CM

## 2020-10-05 DIAGNOSIS — C18.9 COLON ADENOCARCINOMA: Primary | ICD-10-CM

## 2020-10-05 PROCEDURE — 1101F PR PT FALLS ASSESS DOC 0-1 FALLS W/OUT INJ PAST YR: ICD-10-PCS | Mod: HCNC,CPTII,S$GLB, | Performed by: COLON & RECTAL SURGERY

## 2020-10-05 PROCEDURE — 99214 OFFICE O/P EST MOD 30 MIN: CPT | Mod: HCNC,S$GLB,, | Performed by: COLON & RECTAL SURGERY

## 2020-10-05 PROCEDURE — 99999 PR PBB SHADOW E&M-EST. PATIENT-LVL IV: CPT | Mod: PBBFAC,HCNC,, | Performed by: COLON & RECTAL SURGERY

## 2020-10-05 PROCEDURE — 1126F AMNT PAIN NOTED NONE PRSNT: CPT | Mod: HCNC,S$GLB,, | Performed by: COLON & RECTAL SURGERY

## 2020-10-05 PROCEDURE — 3078F DIAST BP <80 MM HG: CPT | Mod: HCNC,CPTII,S$GLB, | Performed by: COLON & RECTAL SURGERY

## 2020-10-05 PROCEDURE — 3077F SYST BP >= 140 MM HG: CPT | Mod: HCNC,CPTII,S$GLB, | Performed by: COLON & RECTAL SURGERY

## 2020-10-05 PROCEDURE — 1126F PR PAIN SEVERITY QUANTIFIED, NO PAIN PRESENT: ICD-10-PCS | Mod: HCNC,S$GLB,, | Performed by: COLON & RECTAL SURGERY

## 2020-10-05 PROCEDURE — 1101F PT FALLS ASSESS-DOCD LE1/YR: CPT | Mod: HCNC,CPTII,S$GLB, | Performed by: COLON & RECTAL SURGERY

## 2020-10-05 PROCEDURE — 99214 PR OFFICE/OUTPT VISIT, EST, LEVL IV, 30-39 MIN: ICD-10-PCS | Mod: HCNC,S$GLB,, | Performed by: COLON & RECTAL SURGERY

## 2020-10-05 PROCEDURE — 99499 UNLISTED E&M SERVICE: CPT | Mod: S$GLB,,, | Performed by: COLON & RECTAL SURGERY

## 2020-10-05 PROCEDURE — 82378 CARCINOEMBRYONIC ANTIGEN: CPT | Mod: HCNC

## 2020-10-05 PROCEDURE — 36415 COLL VENOUS BLD VENIPUNCTURE: CPT | Mod: HCNC

## 2020-10-05 PROCEDURE — 3008F BODY MASS INDEX DOCD: CPT | Mod: HCNC,CPTII,S$GLB, | Performed by: COLON & RECTAL SURGERY

## 2020-10-05 PROCEDURE — 3077F PR MOST RECENT SYSTOLIC BLOOD PRESSURE >= 140 MM HG: ICD-10-PCS | Mod: HCNC,CPTII,S$GLB, | Performed by: COLON & RECTAL SURGERY

## 2020-10-05 PROCEDURE — 1159F PR MEDICATION LIST DOCUMENTED IN MEDICAL RECORD: ICD-10-PCS | Mod: HCNC,S$GLB,, | Performed by: COLON & RECTAL SURGERY

## 2020-10-05 PROCEDURE — 1159F MED LIST DOCD IN RCRD: CPT | Mod: HCNC,S$GLB,, | Performed by: COLON & RECTAL SURGERY

## 2020-10-05 PROCEDURE — 99999 PR PBB SHADOW E&M-EST. PATIENT-LVL IV: ICD-10-PCS | Mod: PBBFAC,HCNC,, | Performed by: COLON & RECTAL SURGERY

## 2020-10-05 PROCEDURE — 3078F PR MOST RECENT DIASTOLIC BLOOD PRESSURE < 80 MM HG: ICD-10-PCS | Mod: HCNC,CPTII,S$GLB, | Performed by: COLON & RECTAL SURGERY

## 2020-10-05 PROCEDURE — 3008F PR BODY MASS INDEX (BMI) DOCUMENTED: ICD-10-PCS | Mod: HCNC,CPTII,S$GLB, | Performed by: COLON & RECTAL SURGERY

## 2020-10-05 PROCEDURE — 99499 RISK ADDL DX/OHS AUDIT: ICD-10-PCS | Mod: S$GLB,,, | Performed by: COLON & RECTAL SURGERY

## 2020-10-05 NOTE — PROGRESS NOTES
History & Physical    SUBJECTIVE:     CC: colon adenocarcinoma  Ref MD: Rosanna Harrington MD    History of Present Illness:  Patient is a 70 y.o. male presents for evaluation colonic adenocarcinoma. Pt has a PMHx significant for anemia, GERD, HTN, HLD, CKD, CHF, CVA, CAD and PVD with previous AAA repair who recently underwent a colonoscopy on 06/02/2020 where a malignant-appearing mass was seen in the transverse colon, possible descending colon and biopsied.  Biopsies did confirm this to be adenocarcinoma.  Patient states that he had been having both hematochezia and melena since January intermittently.  Patient states that since colonoscopy and being off Plavix, he has not had any further bleeding or melanotic stools.  Patient's last colonoscopy prior to this 1 was in February 2014.  Patient has significant past surgical history including multiple previous abdominal operations including AAA repair, AAA graft excision and right ax fem bypass with small-bowel resection.  He denies current fever, chills, nausea, vomiting.  Patient reports a significant past coronary history requiring stent placement and likely need for further stent/CABG but is unable to undergo at this time due to high risk nature.    6/11/2020: Open partial colectomy (transverse) with end proximal transverse colostomy construction, extensive lysis of adhesions    Interval history:  Since last clinic visit, the patient has done very well.  He is tolerating regular diet with normal bowel function via his ostomy.  He denies any drainage, discharge or bowel movements from his anus.  He has had a recent PET scan that did not show any evidence of metastatic disease.  He denies any fever, chills, nausea, vomiting.      Review of patient's allergies indicates:   Allergen Reactions    Morphine Itching       Current Outpatient Medications   Medication Sig Dispense Refill    albuterol-ipratropium (DUO-NEB) 2.5 mg-0.5 mg/3 mL nebulizer solution Take 3 mLs by  nebulization every 8 (eight) hours as needed for Wheezing or Shortness of Breath. Rescue 90 mL 0    amLODIPine (NORVASC) 10 MG tablet TAKE 1 TABLET EVERY DAY 90 tablet 3    atorvastatin (LIPITOR) 40 MG tablet Take 1 tablet (40 mg total) by mouth once daily. 90 tablet 3    carvedilol (COREG) 25 MG tablet Take 1 tablet (25 mg total) by mouth 2 (two) times daily with meals. 60 tablet 11    cetirizine (ZYRTEC) 10 MG tablet Take 10 mg by mouth once daily.      folic acid-vit B6-vit B12 2.5-25-2 mg (FOLBIC OR EQUIV) 2.5-25-2 mg Tab Take 1 tablet by mouth once daily. 90 tablet 3    HYDROcodone-acetaminophen (NORCO) 7.5-325 mg per tablet Take 1 tablet by mouth every 8 (eight) hours as needed for Pain. 20 tablet 0    isosorbide mononitrate (IMDUR) 120 MG 24 hr tablet Take 1 tablet (120 mg total) by mouth once daily. 90 tablet 3    mupirocin (BACTROBAN) 2 % ointment Apply topically 3 (three) times daily. 1 Tube 2    nitroglycerin (NITROSTAT) 0.6 MG Subl Place 1 tablet (0.6 mg total) under the tongue every 5 (five) minutes as needed (max 3/ per episode). 30 tablet 1    omeprazole (PRILOSEC) 20 MG capsule TAKE 1 CAPSULE TWICE DAILY 180 capsule 3    OXYGEN-AIR DELIVERY SYSTEMS MISC by [facility]Non-Drug; Combo Route) route.      sertraline (ZOLOFT) 50 MG tablet Take 1 tablet (50 mg total) by mouth once daily. 90 tablet 11    triamcinolone acetonide 0.1% (KENALOG) 0.1 % cream Apply topically 2 (two) times daily. 1 Tube 3    aspirin (ECOTRIN) 81 MG EC tablet Take 1 tablet (81 mg total) by mouth once daily.  0    hydrALAZINE (APRESOLINE) 25 MG tablet Take 1 tablet (25 mg total) by mouth every 12 (twelve) hours. 180 tablet 3     No current facility-administered medications for this visit.        Past Medical History:   Diagnosis Date    Acute on chronic congestive heart failure 1/13/2020    Acute respiratory failure with hypoxia 1/14/2020    Analgesic nephropathy     Anemia     AP (angina pectoris) 1/11/2019     Arthritis     Colon polyp     Repeat colonoscopy due in 9/14    COPD exacerbation 2/6/2020    Coronary artery disease     Diverticulosis     colonoscopy 2/21/2014    Dysthymia 2/13/2020    Encounter for blood transfusion     GERD (gastroesophageal reflux disease)     Hemorrhoids     colonoscopy 2/21/2014    Horseshoe kidney     Hyperglycemia 3/17/2014    Hyperlipidemia     Hypertension     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    Late complications of amputation stump     rseolved with further amputation( MRSA then none since 2014)    Lipoma of colon     colonoscopy 2/21/2014    Myocardial infarction     per patient 2000 & 9/2012    Peripheral vascular disease     Phantom limb syndrome     patient reports only intermittent not problematic, not worsening    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    Spinal cord disease     L4L5 disc    Stroke     Tobacco dependence     resolved    Ureteral stent retained      Past Surgical History:   Procedure Laterality Date    ABDOMINAL AORTIC ANEURYSM REPAIR      ABDOMINAL AORTIC ANEURYSM REPAIR  1996/2014    AMPUTATION, LOWER LIMB      AORTA - BILATERAL FEMORAL ARTERY BYPASS GRAFT  2014    Left and right leg    COLONOSCOPY N/A 6/2/2020    Procedure: COLONOSCOPY;  Surgeon: Rosanna Harrington MD;  [facility];  Service: Endoscopy;  Laterality: N/A;    COLOSTOMY N/A 6/11/2020    Procedure: CREATION, COLOSTOMY;  Surgeon: Leonardo Yang MD;  Location: [facility];  Service: General;  Laterality: N/A;    CORONARY ANGIOPLASTY WITH STENT PLACEMENT  2000    Three placed in heart    CYSTOSCOPY W/ RETROGRADES Left 5/29/2018    Procedure: CYSTOSCOPY, WITH RETROGRADE PYELOGRAM;  Surgeon: Scooter Jin IV, MD;  Location: [facility] OR;  Service: Urology;  Laterality: Left;    CYSTOSCOPY W/ URETERAL STENT PLACEMENT Left 5/29/2018    Procedure: CYSTOSCOPY, WITH URETERAL STENT INSERTION;  Surgeon: Scooter Jin IV, MD;  Location: [facility] OR;  Service:  Urology;  Laterality: Left;    CYSTOSCOPY W/ URETERAL STENT PLACEMENT Left 2/4/2020    Procedure: CYSTOSCOPY, WITH URETERAL STENT INSERTION;  Surgeon: Scooter Jin IV, MD;  Location: [facility] OR;  Service: Urology;  Laterality: Left;    CYSTOSCOPY W/ URETERAL STENT REMOVAL Left 5/29/2018    Procedure: CYSTOSCOPY, WITH URETERAL STENT REMOVAL;  Surgeon: Scooter Jin IV, MD;  Location: [facility] OR;  Service: Urology;  Laterality: Left;    CYSTOSCOPY W/ URETERAL STENT REMOVAL Left 2/4/2020    Procedure: CYSTOSCOPY, WITH URETERAL STENT REMOVAL;  Surgeon: Scooter Jin IV, MD;  Location: [facility] OR;  Service: Urology;  Laterality: Left;    ESOPHAGOGASTRODUODENOSCOPY N/A 6/2/2020    Procedure: EGD (ESOPHAGOGASTRODUODENOSCOPY);  Surgeon: Rosanna Harrington MD;  Location: [facility] ENDO;  Service: Endoscopy;  Laterality: N/A;    FOOT AMPUTATION THROUGH METATARSAL  1996    left    FOOT SURGERY Bilateral 1980's    per patient multiple toe amputations prior to.  partial foot amputation:first great toe then other toes     INJECTION OF ANESTHETIC AGENT INTO TISSUE PLANE DEFINED BY TRANSVERSUS ABDOMINIS MUSCLE N/A 6/11/2020    Procedure: BLOCK, TRANSVERSUS ABDOMINIS PLANE;  Surgeon: Leonardo Yang MD;  Location: [facility] OR;  Service: General;  Laterality: N/A;    KIDNEY SURGERY  2014    per patient separation of horseshoe kidney @ time of AAA repair    LEFT HEART CATHETERIZATION Left 3/7/2019    Procedure: CATHETERIZATION, HEART, LEFT;  Surgeon: Adriel Boone MD;  Location: [facility] CATH LAB;  Service: Cardiology;  Laterality: Left;  630 admit for IV hydration  10am start    LUNG LOBECTOMY Right 1970s    per patient not cancer    LYSIS OF ADHESIONS N/A 6/11/2020    Procedure: LYSIS, ADHESIONS;  Surgeon: Leonardo Yang MD;  Location: [facility] OR;  Service: General;  Laterality: N/A;    OMENTECTOMY N/A 6/11/2020    Procedure: OMENTECTOMY;  Surgeon: Leonardo Yang MD;  Location: [facility] OR;  Service: General;  Laterality:  N/A;    right below knee amputation   (approx)    SMALL INTESTINE SURGERY  2014    per patient partial @ time of aaa repair  not small bowel - large bowel bowel compromised bythtwe AAAbowel    SUBTOTAL COLECTOMY N/A 2020    Procedure: COLECTOMY, PARTIAL;  Surgeon: Leonardo Yang MD;  Location: [facility] OR;  Service: General;  Laterality: N/A;    TONSILLECTOMY   aprox    URETERAL STENT PLACEMENT Left     annually replaced since  or so  Dr Jin     Family History   Problem Relation Age of Onset    Cancer Mother         lung    COPD Mother     Heart disease Father         MI but per patient bc of old age    Diabetes Daughter     Eczema Neg Hx     Lupus Neg Hx     Psoriasis Neg Hx     Melanoma Neg Hx     Kidney disease Neg Hx     Stroke Neg Hx     Mental illness Neg Hx     Mental retardation Neg Hx     Hypertension Neg Hx     Hyperlipidemia Neg Hx     Drug abuse Neg Hx     Alcohol abuse Neg Hx     Depression Neg Hx      Social History     Tobacco Use    Smoking status: Former Smoker     Packs/day: 1.00     Years: 15.00     Pack years: 15.00     Quit date: 2009     Years since quittin.7    Smokeless tobacco: Never Used   Substance Use Topics    Alcohol use: No    Drug use: No     Comment: Is on prescription opiod, no non prescribed use        Review of Systems:  Review of Systems   Constitutional: Negative for activity change, appetite change, chills, fatigue, fever and unexpected weight change.   HENT: Negative for congestion, ear pain, sore throat and trouble swallowing.    Eyes: Negative for pain, redness and itching.   Respiratory: Negative for cough, shortness of breath and wheezing.    Cardiovascular: Negative for chest pain, palpitations and leg swelling.   Gastrointestinal: Negative for abdominal distention, abdominal pain, anal bleeding, blood in stool, constipation, diarrhea, nausea, rectal pain and vomiting.   Endocrine: Negative for cold intolerance,  "heat intolerance and polyuria.   Genitourinary: Negative for dysuria, flank pain, frequency and hematuria.   Musculoskeletal: Negative for gait problem, joint swelling and neck pain.   Skin: Negative for color change, rash and wound.   Allergic/Immunologic: Negative for environmental allergies and immunocompromised state.   Neurological: Negative for dizziness, speech difficulty, weakness and numbness.   Psychiatric/Behavioral: Negative for agitation, confusion and hallucinations.       OBJECTIVE:     Vital Signs (Most Recent)  Temp: 97.7 °F (36.5 °C) (10/05/20 0853)  Pulse: 66 (10/05/20 0853)  BP: (!) 150/67 (10/05/20 0853)  6' 1" (1.854 m)  78.8 kg (173 lb 11.6 oz)     Physical Exam:  Physical Exam  Constitutional:       Appearance: He is well-developed.   HENT:      Head: Normocephalic and atraumatic.   Eyes:      Conjunctiva/sclera: Conjunctivae normal.   Neck:      Musculoskeletal: Normal range of motion.      Thyroid: No thyromegaly.   Cardiovascular:      Rate and Rhythm: Normal rate and regular rhythm.   Pulmonary:      Effort: Pulmonary effort is normal. No respiratory distress.   Abdominal:      Comments: Soft, nontender, nondistended, well-healed midline incision; ostomy pink and viable   Musculoskeletal: Normal range of motion.         General: No tenderness.   Skin:     General: Skin is warm and dry.      Capillary Refill: Capillary refill takes less than 2 seconds.      Findings: No rash.   Neurological:      Mental Status: He is alert and oriented to person, place, and time.         Laboratory  Lab Results   Component Value Date    WBC 5.13 09/02/2020    HGB 10.1 (L) 09/02/2020    HCT 32.2 (L) 09/02/2020     09/02/2020    CHOL 117 (L) 04/15/2020    TRIG 57 04/15/2020    HDL 55 04/15/2020    ALT 11 06/17/2020    AST 12 06/17/2020     09/02/2020    K 4.8 09/02/2020     (H) 09/02/2020    CREATININE 2.2 (H) 09/02/2020    BUN 32 (H) 09/02/2020    CO2 17 (L) 09/02/2020    TSH 1.747 " 03/27/2019    PSA 0.72 05/17/2018    INR 1.0 06/14/2020    HGBA1C 4.8 04/15/2020         Diagnostic Results:  CT: Reviewed  PET CT:  FINDINGS:  Quality of the study: Adequate.     Head neck: No abnormal foci of increased tracer uptake are present.     Chest: There is no elevated FDG uptake seen associated with in the of the pulmonary nodules described on prior CT.  Note that this may potentially be related to small lesion size.  Metastatic disease cannot be entirely excluded.  No FDG avid adenopathy demonstrated.     Abdomen and pelvis: No abnormal foci of increased tracer uptake are present.     Skeletal: There is some low level FDG uptake associated with the bilateral acromioclavicular joints, worse on the right.  Findings are favored to be degenerative in nature.  No suspicious FDG avid osseous lesions visualized.     Physiologic uptake of the tracer is present within the brain, salivary glands, myocardium, GI and  tracts.     Incidental CT findings: Prior partial colectomy with right lower quadrant colostomy noted.  Numerous sigmoid colonic diverticula present without evidence of acute diverticulitis.  There is a left ureteral stent in place.  Moderate left-sided hydronephrosis appears similar to prior.  Axillofemoral and fem-fem bypass grafts are present.  Fairly extensive calcified atherosclerotic plaque noted throughout the chest, abdomen, and pelvis.     Impression:     No appreciable elevated FDG uptake associated with multiple previously described small pulmonary nodules.  Note that this may potentially be related to small lesion size.  Cannot entirely exclude metastatic disease.  Follow-up is recommended.     No suspicious foci of elevated FDG uptake.    PATHOLOGY:  1. Transverse colon, segmental resection:  - Invasive moderately differentiated colonic adenocarcinoma    Synoptic report:  Procedure: Transverse colectomy  Tumor Site: Transverse colon  Tumor size: 4.3 cm  Macroscopic tumor perforation: Not  identified  Histologic type: Adenocarcinoma  Histologic grade: G2 - moderately differentiated  Tumor extension: Tumor invades through the muscularis propria into the pericolorectal tissue  Margins: All margins are uninvolved by invasive carcinoma  Margins examined: Proximal, distal and mesenteric  Distance of carcinoma from closest margin: 2.0 cm from non-oriented peripheral margin  Treatment effect: No known presurgical therapy  Lymph-vascular invasion: Not identified  Perineural invasion: Not identified  Tumor deposits: Not identified  Number of lymph nodes involved: 0 of 17  AJCC TNM descriptors: pT3N0      ASSESSMENT/PLAN:     69yo M with multiple medical comorbidities who presents with transverse vs descending colonic adenocarcinoma with possible metastatic lung disease who is now s/p open transverse colectomy with end transverse colostomy on 6/11/2020 with final path showing T3N0 disease    - needs CEA level today  - Cont surveillance via imaging per [facility] for lung nodules  - RTC 3 months    Leonardo Yang MD  Colon and Rectal Surgery  [facility]

## 2020-10-06 LAB — CEA SERPL-MCNC: 1.9 NG/ML (ref 0–5)

## 2020-10-07 ENCOUNTER — CARE AT HOME (OUTPATIENT)
Dept: HOME HEALTH SERVICES | Facility: CLINIC | Age: 70
End: 2020-10-07
Payer: MEDICARE

## 2020-10-07 VITALS
TEMPERATURE: 98 F | RESPIRATION RATE: 18 BRPM | DIASTOLIC BLOOD PRESSURE: 62 MMHG | SYSTOLIC BLOOD PRESSURE: 132 MMHG | HEART RATE: 68 BPM | OXYGEN SATURATION: 99 %

## 2020-10-07 DIAGNOSIS — Z09 FOLLOW UP: Primary | ICD-10-CM

## 2020-10-07 PROCEDURE — 1126F PR PAIN SEVERITY QUANTIFIED, NO PAIN PRESENT: ICD-10-PCS | Mod: S$GLB,,, | Performed by: NURSE PRACTITIONER

## 2020-10-07 PROCEDURE — 99349 HOME/RES VST EST MOD MDM 40: CPT | Mod: S$GLB,,, | Performed by: NURSE PRACTITIONER

## 2020-10-07 PROCEDURE — 3078F PR MOST RECENT DIASTOLIC BLOOD PRESSURE < 80 MM HG: ICD-10-PCS | Mod: CPTII,S$GLB,, | Performed by: NURSE PRACTITIONER

## 2020-10-07 PROCEDURE — 3075F SYST BP GE 130 - 139MM HG: CPT | Mod: CPTII,S$GLB,, | Performed by: NURSE PRACTITIONER

## 2020-10-07 PROCEDURE — 3075F PR MOST RECENT SYSTOLIC BLOOD PRESS GE 130-139MM HG: ICD-10-PCS | Mod: CPTII,S$GLB,, | Performed by: NURSE PRACTITIONER

## 2020-10-07 PROCEDURE — 1101F PR PT FALLS ASSESS DOC 0-1 FALLS W/OUT INJ PAST YR: ICD-10-PCS | Mod: CPTII,S$GLB,, | Performed by: NURSE PRACTITIONER

## 2020-10-07 PROCEDURE — 1101F PT FALLS ASSESS-DOCD LE1/YR: CPT | Mod: CPTII,S$GLB,, | Performed by: NURSE PRACTITIONER

## 2020-10-07 PROCEDURE — 1159F PR MEDICATION LIST DOCUMENTED IN MEDICAL RECORD: ICD-10-PCS | Mod: S$GLB,,, | Performed by: NURSE PRACTITIONER

## 2020-10-07 PROCEDURE — 1159F MED LIST DOCD IN RCRD: CPT | Mod: S$GLB,,, | Performed by: NURSE PRACTITIONER

## 2020-10-07 PROCEDURE — 1126F AMNT PAIN NOTED NONE PRSNT: CPT | Mod: S$GLB,,, | Performed by: NURSE PRACTITIONER

## 2020-10-07 PROCEDURE — 3078F DIAST BP <80 MM HG: CPT | Mod: CPTII,S$GLB,, | Performed by: NURSE PRACTITIONER

## 2020-10-07 PROCEDURE — 99349 PR HOME VISIT,ESTAB PATIENT,LEVEL III: ICD-10-PCS | Mod: S$GLB,,, | Performed by: NURSE PRACTITIONER

## 2020-10-07 NOTE — PATIENT INSTRUCTIONS
- [facility] to schedule follow-up visit with patient in 4-6 weeks or as needed.  - Continue all medications, treatments and therapies as ordered.   - Follow all instructions, recommendations as discussed.  - Maintain Safety Precautions at all times.  - Attend all medical appointments as scheduled.  - For worsening symptoms: call Primary Care Physician or Nurse Practitioner.  - For emergencies, call 911 or immediately report to the nearest emergency room.  - Limit Risks of environmental exposure to coronavirus as discussed including: social distancing, hand hygiene, and limiting departures from the home for necessities only.     Your care is important to us. If your provider recommended a follow-up appointment or test, we are happy to help you coordinate your recommended care. It is important that you complete your recommended follow-up. If you need help scheduling, please call [phone]. Appointments can also be made online through the patient portal.  While scheduling and attending your appointments is your responsibility, our goal is to support and empower you throughout that process.    [facility] Care Line - 24/7 Assistance  Unless otherwise directed by your provider, please contact [facility], our nurse care line that is available for 24/7 assistance.  Registered nurses in the [facility] provide: appointment scheduling, clinical advisement, health education, and other advisory services.  Call: [phone] (toll free)    COVID-19 Prevention   Avoid close contact with people and stay home if youre sick, except to get medical care.   Cover coughs and sneezes with a tissue, or use the inside of your elbow. Immediately wash your hands or use hand .  For more information, see CDC link below:  https://www.cdc.gov/coronavirus/2019-ncov/hcp/guidance-prevent-spread.html#precautions     The following information is provided to all patients.  This information is  to help you find resources for any of the problems found today that may be affecting your health:            Living healthy guide: www.[facility].[place]    Understanding Diabetes: www.diabetes.org   Eating healthy: www.cdc.gov/healthyweight   CDC home safety checklist: www.cdc.gov/steadi/patient.html  Agency on Aging: www.goea.[place].[facility]    Alcoholics anonymous (AA): www.aa.org   Physical Activity: www.leon.nih.gov/zb4qbbs    Tobacco use: www.quitwithusla.org

## 2020-10-07 NOTE — PROGRESS NOTES
[facility]  Medical Home Visit    Visit Date: 10/7/2020  Encounter Provider: Porter Salinas NP  PCP:  Norma Nuñez MD    Subjective:      Patient ID: Kodi Ribeiro is a 70 y.o. male.    Consult Requested By:  Porter Salinas  Reason for Consult: Medical Follow-Up and Medication Review    The patient is being seen at home due to physical debility that presents a taxing effort to leave the home, to mitigate high risk of hospital readmission or due to the limited availability of reliable or safe options for transportation to the point of access to the provider. Prior to treatment on this visit the chart was reviewed and patient consent was obtained.    Chief Complaint: Follow-up for chronic medical conditions and medication review    Today:  Mr. Kodi Ribeiro is a 70 y.o. male is being seen today for follow-up for chronic medical conditions and medication review. Kodi presents at baseline state of health as reported by patient. VSS.  Bowel sounds + above pouch.. Denies any acute issues, concerns or complaints to address on today's visit. Reports taking all medications as prescribed. No other needs identified at this time.   [facility] NP to follow up in 4-6 weeks or as needed.    ROS:   Review of Systems   Constitutional: Negative for chills and fever.   HENT: Negative.    Eyes: Negative.    Respiratory: Negative.  Negative for cough and shortness of breath.    Cardiovascular: Negative for chest pain, palpitations and leg swelling.   Gastrointestinal: Negative.  Negative for abdominal distention, abdominal pain, constipation, diarrhea and nausea.        Stool to colostomy pouch of normal consistency and color since discharge, increase in flatulence   Endocrine: Negative.    Genitourinary: Negative.    Musculoskeletal: Positive for gait problem.        Amputee   Skin: Negative.         Nini-colostomy skin intact,clean and dry   Allergic/Immunologic: Negative.     Hematological: Negative.    Psychiatric/Behavioral: Negative.    All other systems reviewed and are negative.    Assessments:  · Environmental: single story home, no steps to enter, adequate lighting and temeprature control  · Functional Status: Independent with ADL's/IADL's, ambulates with assistance of a cane/walker, continent of bowel and bladder  · Safety: Fall Precautions, Oxygen Use PRN  · Nutritional: Adequate  · Home Health: None  · DME/Supplies: wheelchair, rollator walker, shower bench, oxygen tanks/concentrator     Objective:     Vitals:    10/07/20 1210   BP: 132/62   Pulse: 68   Resp: 18   Temp: 97.9 °F (36.6 °C)   TempSrc: Temporal   SpO2: 99%   PainSc: 0-No pain     There is no height or weight on file to calculate BMI.    Physical Exam  Vitals signs reviewed.   Constitutional:       General: He is not in acute distress.     Appearance: Normal appearance. He is well-developed. He is not ill-appearing.   HENT:      Head: Normocephalic and atraumatic.      Nose: Nose normal.      Mouth/Throat:      Mouth: Mucous membranes are moist.   Eyes:      Pupils: Pupils are equal, round, and reactive to light.   Neck:      Musculoskeletal: Normal range of motion and neck supple.      Trachea: No tracheal deviation.   Cardiovascular:      Rate and Rhythm: Normal rate and regular rhythm.      Heart sounds: Murmur present.   Pulmonary:      Effort: Pulmonary effort is normal. No respiratory distress.      Breath sounds: Normal breath sounds.   Abdominal:      General: Abdomen is flat.      Palpations: Abdomen is soft.      Tenderness: There is no abdominal tenderness (kendall stomal). There is no guarding.          Comments: Bowel sounds wnl above colostomy pouch  Soft, light brown stool noted to bag   Musculoskeletal: Normal range of motion.      Comments: Bilateral amputee   Skin:     General: Skin is warm and dry.      Capillary Refill: Capillary refill takes less than 2 seconds.   Neurological:      Mental  Status: He is alert and oriented to person, place, and time.      Gait: Gait abnormal.   Psychiatric:         Behavior: Behavior normal.         Judgment: Judgment normal.       Assessment:     Medical Follow-Up and Medication Review    Plan:     Ethical / Legal: Advance Care Planning   Capacity to make medical decisions:  yes, Conflict no  · Surrogate decision maker:  eKnton Ross, Relationship: wife  · Advance Directives:  none  · HCPOA: none  · [place]  [place]  · Code Status:  Full    Advanced Care Directives and HCPOA forms left in the home for family review, discussion and signing with instructions to return upon their next provider encounter for inclusion to the medical record.   [place] was signed by the patient and will be sent to medical records for inclusion into the medical record.  Kodi was seen today for follow-up.  Diagnoses and all orders for this visit:     Encounter for Medical Follow-Up and Medication Review   - [facility] Nurse Practitioner to schedule home visit with patient in 4-6 weeks or PRN    Were controlled substances prescribed?  No    Follow Up Appointments:   Future Appointments   Date Time Provider Department Center   10/15/2020 10:50 AM Scooter Jin IV, MD [facility] UROLOGY [facility]   11/16/2020 12:00 PM Porter Salinas, MILENA [id number]   1/4/2021 10:20 AM Leonardo Yang MD [facility]   1/21/2021  9:50 AM LABORATORY, [facility] LAB [facility]   1/21/2021 10:00 AM SPECIMEN, [facility] SPECLAB [facility]   1/28/2021 11:00 AM Brenton Jacobson MD [facility] NEPHRO [facility]     Attestation: Screening criteria to assess the level of the patient's risk for infection with COVID-19 as recommended by the CDC at the time of the above documented home visit concluded appropriateness to proceed. Universal precautions were maintained at all times, including provider use of >60% alcohol gel hand  immediately prior to entry and upon  departing the patient's home as well as cleaning of equipment used in home visit with a bacterial/germicidal disposable wipes.    Signature:    Porter Simon, MSN, APRN, FNP-C  [facility]    Total face-to-face time was 40 min, >50% of this was spent on counseling and coordination of care. The following issues were discussed: primary and secondary diagnoses, co-morbidities, prescribed medications, treatment modalities, importance of compliance with medical advice and directives for follow-up care.

## 2020-10-10 ENCOUNTER — HOSPITAL ENCOUNTER (OUTPATIENT)
Facility: HOSPITAL | Age: 70
Discharge: HOME | End: 2020-10-11
Attending: FAMILY MEDICINE | Admitting: HOSPITALIST
Payer: MEDICARE

## 2020-10-10 DIAGNOSIS — N39.0 URINARY TRACT INFECTION WITHOUT HEMATURIA, SITE UNSPECIFIED: ICD-10-CM

## 2020-10-10 DIAGNOSIS — I25.2 OLD MI (MYOCARDIAL INFARCTION): Chronic | ICD-10-CM

## 2020-10-10 DIAGNOSIS — R06.02 SHORTNESS OF BREATH: ICD-10-CM

## 2020-10-10 DIAGNOSIS — J96.21 ACUTE ON CHRONIC RESPIRATORY FAILURE WITH HYPOXIA: Primary | ICD-10-CM

## 2020-10-10 DIAGNOSIS — J81.0 ACUTE PULMONARY EDEMA: ICD-10-CM

## 2020-10-10 DIAGNOSIS — R06.02 SOB (SHORTNESS OF BREATH): ICD-10-CM

## 2020-10-10 DIAGNOSIS — R09.02 HYPOXIA: ICD-10-CM

## 2020-10-10 DIAGNOSIS — R07.9 CHEST PAIN: ICD-10-CM

## 2020-10-10 DIAGNOSIS — I25.10 CAD (CORONARY ARTERY DISEASE): ICD-10-CM

## 2020-10-10 LAB
ALBUMIN SERPL BCP-MCNC: 3.8 G/DL (ref 3.5–5.2)
ALLENS TEST: ABNORMAL
ALP SERPL-CCNC: 199 U/L (ref 55–135)
ALT SERPL W/O P-5'-P-CCNC: 9 U/L (ref 10–44)
ANION GAP SERPL CALC-SCNC: 8 MMOL/L (ref 8–16)
AST SERPL-CCNC: 13 U/L (ref 10–40)
BACTERIA #/AREA URNS HPF: ABNORMAL /HPF
BASOPHILS # BLD AUTO: 0.06 K/UL (ref 0–0.2)
BASOPHILS NFR BLD: 0.7 % (ref 0–1.9)
BILIRUB SERPL-MCNC: 0.5 MG/DL (ref 0.1–1)
BILIRUB UR QL STRIP: NEGATIVE
BNP SERPL-MCNC: 699 PG/ML (ref 0–99)
BUN SERPL-MCNC: 28 MG/DL (ref 8–23)
CALCIUM SERPL-MCNC: 8.3 MG/DL (ref 8.7–10.5)
CHLORIDE SERPL-SCNC: 111 MMOL/L (ref 95–110)
CLARITY UR: CLEAR
CO2 SERPL-SCNC: 16 MMOL/L (ref 23–29)
COLOR UR: YELLOW
CREAT SERPL-MCNC: 2.1 MG/DL (ref 0.5–1.4)
D DIMER PPP IA.FEU-MCNC: 4.22 MG/L FEU
DELSYS: ABNORMAL
DIFFERENTIAL METHOD: ABNORMAL
EOSINOPHIL # BLD AUTO: 0.3 K/UL (ref 0–0.5)
EOSINOPHIL NFR BLD: 3 % (ref 0–8)
ERYTHROCYTE [DISTWIDTH] IN BLOOD BY AUTOMATED COUNT: 16.5 % (ref 11.5–14.5)
EST. GFR  (AFRICAN AMERICAN): 36 ML/MIN/1.73 M^2
EST. GFR  (NON AFRICAN AMERICAN): 31 ML/MIN/1.73 M^2
FIO2: 21
GLUCOSE SERPL-MCNC: 100 MG/DL (ref 70–110)
GLUCOSE UR QL STRIP: NEGATIVE
HCO3 UR-SCNC: 14.4 MMOL/L (ref 24–28)
HCT VFR BLD AUTO: 33.2 % (ref 40–54)
HGB BLD-MCNC: 10.4 G/DL (ref 14–18)
HGB UR QL STRIP: ABNORMAL
HYALINE CASTS #/AREA URNS LPF: 0 /LPF
IMM GRANULOCYTES # BLD AUTO: 0.07 K/UL (ref 0–0.04)
IMM GRANULOCYTES NFR BLD AUTO: 0.8 % (ref 0–0.5)
KETONES UR QL STRIP: NEGATIVE
LEUKOCYTE ESTERASE UR QL STRIP: ABNORMAL
LYMPHOCYTES # BLD AUTO: 0.9 K/UL (ref 1–4.8)
LYMPHOCYTES NFR BLD: 9.7 % (ref 18–48)
MCH RBC QN AUTO: 28 PG (ref 27–31)
MCHC RBC AUTO-ENTMCNC: 31.3 G/DL (ref 32–36)
MCV RBC AUTO: 89 FL (ref 82–98)
MICROSCOPIC COMMENT: ABNORMAL
MODE: ABNORMAL
MONOCYTES # BLD AUTO: 0.6 K/UL (ref 0.3–1)
MONOCYTES NFR BLD: 6.3 % (ref 4–15)
NEUTROPHILS # BLD AUTO: 7.1 K/UL (ref 1.8–7.7)
NEUTROPHILS NFR BLD: 79.5 % (ref 38–73)
NITRITE UR QL STRIP: NEGATIVE
NRBC BLD-RTO: 0 /100 WBC
PCO2 BLDA: 26.1 MMHG (ref 35–45)
PH SMN: 7.35 [PH] (ref 7.35–7.45)
PH UR STRIP: 7 [PH] (ref 5–8)
PLATELET # BLD AUTO: 227 K/UL (ref 150–350)
PMV BLD AUTO: 9.4 FL (ref 9.2–12.9)
PO2 BLDA: 59 MMHG (ref 80–100)
POC BE: -11 MMOL/L
POC SATURATED O2: 89 % (ref 95–100)
POTASSIUM SERPL-SCNC: 4.8 MMOL/L (ref 3.5–5.1)
PROT SERPL-MCNC: 8.2 G/DL (ref 6–8.4)
PROT UR QL STRIP: ABNORMAL
RBC # BLD AUTO: 3.72 M/UL (ref 4.6–6.2)
RBC #/AREA URNS HPF: 3 /HPF (ref 0–4)
SAMPLE: ABNORMAL
SARS-COV-2 RDRP RESP QL NAA+PROBE: NEGATIVE
SITE: ABNORMAL
SODIUM SERPL-SCNC: 135 MMOL/L (ref 136–145)
SP GR UR STRIP: 1.02 (ref 1–1.03)
TROPONIN I SERPL DL<=0.01 NG/ML-MCNC: 0.01 NG/ML (ref 0–0.03)
URN SPEC COLLECT METH UR: ABNORMAL
UROBILINOGEN UR STRIP-ACNC: NEGATIVE EU/DL
WBC # BLD AUTO: 8.89 K/UL (ref 3.9–12.7)
WBC #/AREA URNS HPF: 15 /HPF (ref 0–5)
WBC CLUMPS URNS QL MICRO: ABNORMAL

## 2020-10-10 PROCEDURE — 99291 CRITICAL CARE FIRST HOUR: CPT | Mod: 25,HCNC

## 2020-10-10 PROCEDURE — 83880 ASSAY OF NATRIURETIC PEPTIDE: CPT | Mod: HCNC

## 2020-10-10 PROCEDURE — 25000003 PHARM REV CODE 250: Mod: HCNC | Performed by: FAMILY MEDICINE

## 2020-10-10 PROCEDURE — 82803 BLOOD GASES ANY COMBINATION: CPT | Mod: HCNC

## 2020-10-10 PROCEDURE — 85379 FIBRIN DEGRADATION QUANT: CPT | Mod: HCNC

## 2020-10-10 PROCEDURE — 99900035 HC TECH TIME PER 15 MIN (STAT): Mod: HCNC

## 2020-10-10 PROCEDURE — 84484 ASSAY OF TROPONIN QUANT: CPT | Mod: HCNC

## 2020-10-10 PROCEDURE — 81000 URINALYSIS NONAUTO W/SCOPE: CPT | Mod: HCNC

## 2020-10-10 PROCEDURE — 80053 COMPREHEN METABOLIC PANEL: CPT | Mod: HCNC

## 2020-10-10 PROCEDURE — 93005 ELECTROCARDIOGRAM TRACING: CPT | Mod: HCNC

## 2020-10-10 PROCEDURE — 93010 ELECTROCARDIOGRAM REPORT: CPT | Mod: HCNC,,, | Performed by: INTERNAL MEDICINE

## 2020-10-10 PROCEDURE — 93010 EKG 12-LEAD: ICD-10-PCS | Mod: HCNC,,, | Performed by: INTERNAL MEDICINE

## 2020-10-10 PROCEDURE — U0002 COVID-19 LAB TEST NON-CDC: HCPCS | Mod: HCNC

## 2020-10-10 PROCEDURE — 85025 COMPLETE CBC W/AUTO DIFF WBC: CPT | Mod: HCNC

## 2020-10-10 PROCEDURE — 36415 COLL VENOUS BLD VENIPUNCTURE: CPT | Mod: HCNC

## 2020-10-10 PROCEDURE — 36600 WITHDRAWAL OF ARTERIAL BLOOD: CPT | Mod: HCNC

## 2020-10-10 RX ADMIN — NITROGLYCERIN 1 INCH: 20 OINTMENT TOPICAL at 08:10

## 2020-10-11 VITALS
HEART RATE: 66 BPM | DIASTOLIC BLOOD PRESSURE: 64 MMHG | SYSTOLIC BLOOD PRESSURE: 131 MMHG | TEMPERATURE: 98 F | WEIGHT: 173 LBS | RESPIRATION RATE: 16 BRPM | BODY MASS INDEX: 22.93 KG/M2 | HEIGHT: 73 IN | OXYGEN SATURATION: 95 %

## 2020-10-11 PROBLEM — J96.21 ACUTE ON CHRONIC RESPIRATORY FAILURE WITH HYPOXIA: Status: RESOLVED | Noted: 2020-10-11 | Resolved: 2020-10-11

## 2020-10-11 PROBLEM — J96.21 ACUTE ON CHRONIC RESPIRATORY FAILURE WITH HYPOXIA: Status: ACTIVE | Noted: 2020-10-11

## 2020-10-11 PROBLEM — C18.9 COLON ADENOCARCINOMA: Status: RESOLVED | Noted: 2020-06-09 | Resolved: 2020-10-11

## 2020-10-11 PROBLEM — R09.02 HYPOXIA: Status: ACTIVE | Noted: 2020-10-11

## 2020-10-11 LAB
ALBUMIN SERPL BCP-MCNC: 3.8 G/DL (ref 3.5–5.2)
ALP SERPL-CCNC: 199 U/L (ref 55–135)
ALT SERPL W/O P-5'-P-CCNC: 9 U/L (ref 10–44)
ANION GAP SERPL CALC-SCNC: 9 MMOL/L (ref 8–16)
ASCENDING AORTA: 2.61 CM
AST SERPL-CCNC: 11 U/L (ref 10–40)
BASOPHILS # BLD AUTO: 0.07 K/UL (ref 0–0.2)
BASOPHILS NFR BLD: 1.4 % (ref 0–1.9)
BILIRUB SERPL-MCNC: 0.4 MG/DL (ref 0.1–1)
BSA FOR ECHO PROCEDURE: 2.01 M2
BUN SERPL-MCNC: 28 MG/DL (ref 8–23)
CALCIUM SERPL-MCNC: 8.5 MG/DL (ref 8.7–10.5)
CHLORIDE SERPL-SCNC: 111 MMOL/L (ref 95–110)
CO2 SERPL-SCNC: 17 MMOL/L (ref 23–29)
CREAT SERPL-MCNC: 2 MG/DL (ref 0.5–1.4)
CV ECHO LV RWT: 0.36 CM
DIFFERENTIAL METHOD: ABNORMAL
DOP CALC LVOT AREA: 3.7 CM2
DOP CALC LVOT DIAMETER: 2.18 CM
E WAVE DECELERATION TIME: 258.65 MSEC
E/A RATIO: 1.14
E/E' RATIO: 14 M/S
ECHO LV POSTERIOR WALL: 1.01 CM (ref 0.6–1.1)
EOSINOPHIL # BLD AUTO: 0.2 K/UL (ref 0–0.5)
EOSINOPHIL NFR BLD: 3.9 % (ref 0–8)
ERYTHROCYTE [DISTWIDTH] IN BLOOD BY AUTOMATED COUNT: 16.4 % (ref 11.5–14.5)
EST. GFR  (AFRICAN AMERICAN): 38 ML/MIN/1.73 M^2
EST. GFR  (NON AFRICAN AMERICAN): 33 ML/MIN/1.73 M^2
FRACTIONAL SHORTENING: 29 % (ref 28–44)
GLUCOSE SERPL-MCNC: 86 MG/DL (ref 70–110)
HCT VFR BLD AUTO: 33.5 % (ref 40–54)
HGB BLD-MCNC: 10.5 G/DL (ref 14–18)
IMM GRANULOCYTES # BLD AUTO: 0.03 K/UL (ref 0–0.04)
IMM GRANULOCYTES NFR BLD AUTO: 0.6 % (ref 0–0.5)
INTERVENTRICULAR SEPTUM: 1.05 CM (ref 0.6–1.1)
IVRT: 111.32 MSEC
LA MAJOR: 6.53 CM
LA MINOR: 6.37 CM
LA WIDTH: 4.23 CM
LEFT ATRIUM SIZE: 5.63 CM
LEFT ATRIUM VOLUME INDEX: 64.5 ML/M2
LEFT ATRIUM VOLUME: 130.55 CM3
LEFT INTERNAL DIMENSION IN SYSTOLE: 3.93 CM (ref 2.1–4)
LEFT VENTRICLE DIASTOLIC VOLUME INDEX: 74.15 ML/M2
LEFT VENTRICLE DIASTOLIC VOLUME: 150.02 ML
LEFT VENTRICLE MASS INDEX: 111 G/M2
LEFT VENTRICLE SYSTOLIC VOLUME INDEX: 33.2 ML/M2
LEFT VENTRICLE SYSTOLIC VOLUME: 67.08 ML
LEFT VENTRICULAR INTERNAL DIMENSION IN DIASTOLE: 5.54 CM (ref 3.5–6)
LEFT VENTRICULAR MASS: 224.36 G
LV LATERAL E/E' RATIO: 12.09 M/S
LV SEPTAL E/E' RATIO: 16.63 M/S
LYMPHOCYTES # BLD AUTO: 1 K/UL (ref 1–4.8)
LYMPHOCYTES NFR BLD: 19.2 % (ref 18–48)
MCH RBC QN AUTO: 28.2 PG (ref 27–31)
MCHC RBC AUTO-ENTMCNC: 31.3 G/DL (ref 32–36)
MCV RBC AUTO: 90 FL (ref 82–98)
MONOCYTES # BLD AUTO: 0.5 K/UL (ref 0.3–1)
MONOCYTES NFR BLD: 9.4 % (ref 4–15)
MV PEAK A VEL: 1.17 M/S
MV PEAK E VEL: 1.33 M/S
MV STENOSIS PRESSURE HALF TIME: 75.01 MS
MV VALVE AREA P 1/2 METHOD: 2.93 CM2
NEUTROPHILS # BLD AUTO: 3.4 K/UL (ref 1.8–7.7)
NEUTROPHILS NFR BLD: 65.5 % (ref 38–73)
NRBC BLD-RTO: 0 /100 WBC
PLATELET # BLD AUTO: 200 K/UL (ref 150–350)
PMV BLD AUTO: 9.8 FL (ref 9.2–12.9)
POTASSIUM SERPL-SCNC: 4.5 MMOL/L (ref 3.5–5.1)
PROCALCITONIN SERPL IA-MCNC: 0.05 NG/ML
PROT SERPL-MCNC: 8.1 G/DL (ref 6–8.4)
RA MAJOR: 5.35 CM
RA PRESSURE: 3 MMHG
RA WIDTH: 3.91 CM
RBC # BLD AUTO: 3.72 M/UL (ref 4.6–6.2)
RIGHT VENTRICULAR END-DIASTOLIC DIMENSION: 2.87 CM
SINUS: 3.14 CM
SODIUM SERPL-SCNC: 137 MMOL/L (ref 136–145)
STJ: 2.85 CM
TDI LATERAL: 0.11 M/S
TDI SEPTAL: 0.08 M/S
TDI: 0.1 M/S
WBC # BLD AUTO: 5.11 K/UL (ref 3.9–12.7)

## 2020-10-11 PROCEDURE — 84145 PROCALCITONIN (PCT): CPT | Mod: HCNC

## 2020-10-11 PROCEDURE — 85025 COMPLETE CBC W/AUTO DIFF WBC: CPT | Mod: HCNC

## 2020-10-11 PROCEDURE — 90694 VACC AIIV4 NO PRSRV 0.5ML IM: CPT | Mod: HCNC | Performed by: EMERGENCY MEDICINE

## 2020-10-11 PROCEDURE — 96376 TX/PRO/DX INJ SAME DRUG ADON: CPT | Mod: 59

## 2020-10-11 PROCEDURE — G0008 ADMIN INFLUENZA VIRUS VAC: HCPCS | Mod: HCNC | Performed by: EMERGENCY MEDICINE

## 2020-10-11 PROCEDURE — 90471 IMMUNIZATION ADMIN: CPT | Mod: HCNC | Performed by: EMERGENCY MEDICINE

## 2020-10-11 PROCEDURE — G0378 HOSPITAL OBSERVATION PER HR: HCPCS | Mod: HCNC

## 2020-10-11 PROCEDURE — 25000003 PHARM REV CODE 250: Mod: HCNC | Performed by: HOSPITALIST

## 2020-10-11 PROCEDURE — 63600175 PHARM REV CODE 636 W HCPCS: Mod: HCNC | Performed by: EMERGENCY MEDICINE

## 2020-10-11 PROCEDURE — 80053 COMPREHEN METABOLIC PANEL: CPT | Mod: HCNC

## 2020-10-11 PROCEDURE — 93005 ELECTROCARDIOGRAM TRACING: CPT | Mod: HCNC

## 2020-10-11 PROCEDURE — 36415 COLL VENOUS BLD VENIPUNCTURE: CPT | Mod: HCNC

## 2020-10-11 PROCEDURE — 93010 EKG 12-LEAD: ICD-10-PCS | Mod: HCNC,,, | Performed by: INTERNAL MEDICINE

## 2020-10-11 PROCEDURE — 96372 THER/PROPH/DIAG INJ SC/IM: CPT | Mod: 59

## 2020-10-11 PROCEDURE — 96374 THER/PROPH/DIAG INJ IV PUSH: CPT | Mod: 59

## 2020-10-11 PROCEDURE — 93010 ELECTROCARDIOGRAM REPORT: CPT | Mod: HCNC,,, | Performed by: INTERNAL MEDICINE

## 2020-10-11 PROCEDURE — 90472 IMMUNIZATION ADMIN EACH ADD: CPT | Mod: HCNC | Performed by: EMERGENCY MEDICINE

## 2020-10-11 PROCEDURE — 96375 TX/PRO/DX INJ NEW DRUG ADDON: CPT

## 2020-10-11 RX ORDER — ASPIRIN 81 MG/1
81 TABLET ORAL DAILY
Status: DISCONTINUED | OUTPATIENT
Start: 2020-10-11 | End: 2020-10-11 | Stop reason: HOSPADM

## 2020-10-11 RX ORDER — ISOSORBIDE MONONITRATE 60 MG/1
120 TABLET, EXTENDED RELEASE ORAL DAILY
Status: DISCONTINUED | OUTPATIENT
Start: 2020-10-11 | End: 2020-10-11 | Stop reason: HOSPADM

## 2020-10-11 RX ORDER — SERTRALINE HYDROCHLORIDE 50 MG/1
50 TABLET, FILM COATED ORAL DAILY
Status: DISCONTINUED | OUTPATIENT
Start: 2020-10-11 | End: 2020-10-11 | Stop reason: HOSPADM

## 2020-10-11 RX ORDER — HEPARIN SODIUM 5000 [USP'U]/ML
5000 INJECTION, SOLUTION INTRAVENOUS; SUBCUTANEOUS EVERY 8 HOURS
Status: DISCONTINUED | OUTPATIENT
Start: 2020-10-11 | End: 2020-10-11

## 2020-10-11 RX ORDER — AMLODIPINE BESYLATE 10 MG/1
10 TABLET ORAL DAILY
Status: DISCONTINUED | OUTPATIENT
Start: 2020-10-11 | End: 2020-10-11 | Stop reason: HOSPADM

## 2020-10-11 RX ORDER — SODIUM CHLORIDE 0.9 % (FLUSH) 0.9 %
10 SYRINGE (ML) INJECTION
Status: DISCONTINUED | OUTPATIENT
Start: 2020-10-11 | End: 2020-10-11 | Stop reason: HOSPADM

## 2020-10-11 RX ORDER — ATORVASTATIN CALCIUM 40 MG/1
40 TABLET, FILM COATED ORAL DAILY
Status: DISCONTINUED | OUTPATIENT
Start: 2020-10-11 | End: 2020-10-11 | Stop reason: HOSPADM

## 2020-10-11 RX ORDER — CARVEDILOL 12.5 MG/1
25 TABLET ORAL 2 TIMES DAILY WITH MEALS
Status: DISCONTINUED | OUTPATIENT
Start: 2020-10-11 | End: 2020-10-11 | Stop reason: HOSPADM

## 2020-10-11 RX ORDER — FUROSEMIDE 10 MG/ML
40 INJECTION INTRAMUSCULAR; INTRAVENOUS
Status: DISCONTINUED | OUTPATIENT
Start: 2020-10-11 | End: 2020-10-11

## 2020-10-11 RX ORDER — IPRATROPIUM BROMIDE AND ALBUTEROL SULFATE 2.5; .5 MG/3ML; MG/3ML
3 SOLUTION RESPIRATORY (INHALATION) EVERY 8 HOURS PRN
Status: DISCONTINUED | OUTPATIENT
Start: 2020-10-11 | End: 2020-10-11 | Stop reason: HOSPADM

## 2020-10-11 RX ORDER — HYDRALAZINE HYDROCHLORIDE 25 MG/1
25 TABLET, FILM COATED ORAL EVERY 12 HOURS
Status: DISCONTINUED | OUTPATIENT
Start: 2020-10-11 | End: 2020-10-11 | Stop reason: HOSPADM

## 2020-10-11 RX ORDER — FUROSEMIDE 10 MG/ML
40 INJECTION INTRAMUSCULAR; INTRAVENOUS
Status: COMPLETED | OUTPATIENT
Start: 2020-10-11 | End: 2020-10-11

## 2020-10-11 RX ORDER — HEPARIN SODIUM 5000 [USP'U]/ML
5000 INJECTION, SOLUTION INTRAVENOUS; SUBCUTANEOUS EVERY 8 HOURS
Status: DISCONTINUED | OUTPATIENT
Start: 2020-10-11 | End: 2020-10-11 | Stop reason: HOSPADM

## 2020-10-11 RX ORDER — FUROSEMIDE 10 MG/ML
40 INJECTION INTRAMUSCULAR; INTRAVENOUS
Status: DISCONTINUED | OUTPATIENT
Start: 2020-10-11 | End: 2020-10-11 | Stop reason: HOSPADM

## 2020-10-11 RX ORDER — NITROGLYCERIN 0.4 MG/1
0.4 TABLET SUBLINGUAL EVERY 5 MIN PRN
Status: DISCONTINUED | OUTPATIENT
Start: 2020-10-11 | End: 2020-10-11 | Stop reason: HOSPADM

## 2020-10-11 RX ADMIN — ATORVASTATIN CALCIUM 40 MG: 40 TABLET, FILM COATED ORAL at 08:10

## 2020-10-11 RX ADMIN — CEFTRIAXONE 1 G: 1 INJECTION, SOLUTION INTRAVENOUS at 01:10

## 2020-10-11 RX ADMIN — AMLODIPINE BESYLATE 10 MG: 10 TABLET ORAL at 08:10

## 2020-10-11 RX ADMIN — CARVEDILOL 25 MG: 12.5 TABLET, FILM COATED ORAL at 08:10

## 2020-10-11 RX ADMIN — ISOSORBIDE MONONITRATE 120 MG: 60 TABLET, EXTENDED RELEASE ORAL at 08:10

## 2020-10-11 RX ADMIN — ASPIRIN 81 MG: 81 TABLET, COATED ORAL at 08:10

## 2020-10-11 RX ADMIN — SERTRALINE HYDROCHLORIDE 50 MG: 50 TABLET ORAL at 08:10

## 2020-10-11 RX ADMIN — HEPARIN SODIUM 5000 UNITS: 5000 INJECTION INTRAVENOUS; SUBCUTANEOUS at 08:10

## 2020-10-11 RX ADMIN — A/SINGAPORE/GP1908/2015 IVR-180 (AN A/MICHIGAN/45/2015 (H1N1)PDM09-LIKE VIRUS, A/HONG KONG/4801/2014, NYMC X-263B (H3N2) (AN A/HONG KONG/4801/2014-LIKE VIRUS), AND B/BRISBANE/60/2008, WILD TYPE (A B/BRISBANE/60/2008-LIKE VIRUS) 0.5 ML: 15; 15; 15 INJECTION, SUSPENSION INTRAMUSCULAR at 03:10

## 2020-10-11 RX ADMIN — FUROSEMIDE 40 MG: 10 INJECTION, SOLUTION INTRAVENOUS at 09:10

## 2020-10-11 RX ADMIN — HYDRALAZINE HYDROCHLORIDE 25 MG: 25 TABLET, FILM COATED ORAL at 08:10

## 2020-10-11 RX ADMIN — HEPARIN SODIUM 5000 UNITS: 5000 INJECTION INTRAVENOUS; SUBCUTANEOUS at 02:10

## 2020-10-11 RX ADMIN — FUROSEMIDE 40 MG: 10 INJECTION, SOLUTION INTRAMUSCULAR; INTRAVENOUS at 01:10

## 2020-10-11 NOTE — ED PROVIDER NOTES
SCRIBE #1 NOTE: IMaria Teresa, am scribing for, and in the presence of, Beatris Jones MD. I have scribed the HPI, ROS, and PEx.     SCRIBE #2 NOTE: IMallory, am scribing for, and in the presence of,  Barry Timmons MD. I have scribed the remaining portions of the note not scribed by Scribe #1.      History     Chief Complaint   Patient presents with    Shortness of Breath     80% on 2L home O2. pt arrived on NRB 15L at 98%     Review of patient's allergies indicates:   Allergen Reactions    Morphine Itching         History of Present Illness     HPI    10/10/2020, 8:21 PM  History obtained from the patient      History of Present Illness: Kodi Franco is a 70 y.o. male patient with a PMHx of CHF, HLD, HTN, CAD, COPD, anemia who presents to the Emergency Department for evaluation of SOB which onset gradually around 4PM. Per [facility], pt was satting 80% on 2L O2 at home. He arrived to ED on nonrebreather 15L at 98%. Symptoms are constant and moderate in severity. No mitigating or exacerbating factors reported. Associated sxs include CP. He rates CP 3/10 in severity. Patient denies any fever, chills, diaphoresis, palpitations, extremity weakness/numbness, leg swelling, dizziness, cough, N/V, and all other sxs at this time. Prior Tx includes sublingual NTG with no improvement. No further complaints or concerns at this time.         Arrival mode:   \[facility]\""    PCP: Norma Nuñez MD        Past Medical History:  Past Medical History:   Diagnosis Date    Acute on chronic congestive heart failure 1/13/2020    Acute respiratory failure with hypoxia 1/14/2020    Analgesic nephropathy     Anemia     AP (angina pectoris) 1/11/2019    Arthritis     Colon polyp     Repeat colonoscopy due in 9/14    COPD exacerbation 2/6/2020    Coronary artery disease     Diverticulosis     colonoscopy 2/21/2014    Dysthymia 2/13/2020    Encounter for blood transfusion     GERD (gastroesophageal reflux disease)      Hemorrhoids     colonoscopy 2/21/2014    Horseshoe kidney     Hyperglycemia 3/17/2014    Hyperlipidemia     Hypertension     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    Late complications of amputation stump     rseolved with further amputation( MRSA then none since 2014)    Lipoma of colon     colonoscopy 2/21/2014    Myocardial infarction     per patient 2000 & 9/2012    Peripheral vascular disease     Phantom limb syndrome     patient reports only intermittent not problematic, not worsening    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    Spinal cord disease     L4L5 disc    Stroke     Tobacco dependence     resolved    Ureteral stent retained        Past Surgical History:  Past Surgical History:   Procedure Laterality Date    ABDOMINAL AORTIC ANEURYSM REPAIR      ABDOMINAL AORTIC ANEURYSM REPAIR  1996/2014    AMPUTATION, LOWER LIMB      AORTA - BILATERAL FEMORAL ARTERY BYPASS GRAFT  2014    Left and right leg    COLONOSCOPY N/A 6/2/2020    Procedure: COLONOSCOPY;  Surgeon: Rosanna Harrington MD;  Location: [facility] ENDO;  Service: Endoscopy;  Laterality: N/A;    COLOSTOMY N/A 6/11/2020    Procedure: CREATION, COLOSTOMY;  Surgeon: Leonardo Yang MD;  Location: [facility] OR;  Service: General;  Laterality: N/A;    CORONARY ANGIOPLASTY WITH STENT PLACEMENT  2000    Three placed in heart    CYSTOSCOPY W/ RETROGRADES Left 5/29/2018    Procedure: CYSTOSCOPY, WITH RETROGRADE PYELOGRAM;  Surgeon: Scooter Jin IV, MD;  Location: [facility] OR;  Service: Urology;  Laterality: Left;    CYSTOSCOPY W/ URETERAL STENT PLACEMENT Left 5/29/2018    Procedure: CYSTOSCOPY, WITH URETERAL STENT INSERTION;  Surgeon: Scooter iJn IV, MD;  Location: [facility] OR;  Service: Urology;  Laterality: Left;    CYSTOSCOPY W/ URETERAL STENT PLACEMENT Left 2/4/2020    Procedure: CYSTOSCOPY, WITH URETERAL STENT INSERTION;  Surgeon: Scooter Jin IV, MD;  Location: [facility] OR;  Service: Urology;  Laterality: Left;    CYSTOSCOPY W/  URETERAL STENT REMOVAL Left 5/29/2018    Procedure: CYSTOSCOPY, WITH URETERAL STENT REMOVAL;  Surgeon: Scooter Jin IV, MD;  Location: [facility] OR;  Service: Urology;  Laterality: Left;    CYSTOSCOPY W/ URETERAL STENT REMOVAL Left 2/4/2020    Procedure: CYSTOSCOPY, WITH URETERAL STENT REMOVAL;  Surgeon: Scooter Jin IV, MD;  Location: [facility] OR;  Service: Urology;  Laterality: Left;    ESOPHAGOGASTRODUODENOSCOPY N/A 6/2/2020    Procedure: EGD (ESOPHAGOGASTRODUODENOSCOPY);  Surgeon: Rosanna Harrington MD;  Location: [facility] ENDO;  Service: Endoscopy;  Laterality: N/A;    FOOT AMPUTATION THROUGH METATARSAL  1996    left    FOOT SURGERY Bilateral 1980's    per patient multiple toe amputations prior to.  partial foot amputation:first great toe then other toes     INJECTION OF ANESTHETIC AGENT INTO TISSUE PLANE DEFINED BY TRANSVERSUS ABDOMINIS MUSCLE N/A 6/11/2020    Procedure: BLOCK, TRANSVERSUS ABDOMINIS PLANE;  Surgeon: Leonardo Yang MD;  Location: [facility] OR;  Service: General;  Laterality: N/A;    KIDNEY SURGERY  2014    per patient separation of horseshoe kidney @ time of AAA repair    LEFT HEART CATHETERIZATION Left 3/7/2019    Procedure: CATHETERIZATION, HEART, LEFT;  Surgeon: Adriel Boone MD;  Location: [facility] CATH LAB;  Service: Cardiology;  Laterality: Left;  630 admit for IV hydration  10am start    LUNG LOBECTOMY Right 1970s    per patient not cancer    LYSIS OF ADHESIONS N/A 6/11/2020    Procedure: LYSIS, ADHESIONS;  Surgeon: Leonardo Yang MD;  Location: [facility] OR;  Service: General;  Laterality: N/A;    OMENTECTOMY N/A 6/11/2020    Procedure: OMENTECTOMY;  Surgeon: Leonardo Yang MD;  Location: [facility] OR;  Service: General;  Laterality: N/A;    right below knee amputation  2009 (approx)    SMALL INTESTINE SURGERY  2014    per patient partial @ time of aaa repair  not small bowel - large bowel bowel compromised bythtwe AAAbowel    SUBTOTAL COLECTOMY N/A 6/11/2020    Procedure:  COLECTOMY, PARTIAL;  Surgeon: Leonardo Yang MD;  Location: [facility];  Service: General;  Laterality: N/A;    TONSILLECTOMY  5 aprox    URETERAL STENT PLACEMENT Left     annually replaced since  or so  Dr Jin         Family History:  Family History   Problem Relation Age of Onset    Cancer Mother         lung    COPD Mother     Heart disease Father         MI but per patient bc of old age    Diabetes Daughter     Eczema Neg Hx     Lupus Neg Hx     Psoriasis Neg Hx     Melanoma Neg Hx     Kidney disease Neg Hx     Stroke Neg Hx     Mental illness Neg Hx     Mental retardation Neg Hx     Hypertension Neg Hx     Hyperlipidemia Neg Hx     Drug abuse Neg Hx     Alcohol abuse Neg Hx     Depression Neg Hx        Social History:  Social History     Tobacco Use    Smoking status: Former Smoker     Packs/day: 1.00     Years: 15.00     Pack years: 15.00     Quit date: 2009     Years since quittin.7    Smokeless tobacco: Never Used   Substance and Sexual Activity    Alcohol use: No    Drug use: No     Comment: Is on prescription opiod, no non prescribed use    Sexual activity: Not Currently     Partners: Female        Review of Systems     Review of Systems   Constitutional: Negative for chills, diaphoresis and fever.   HENT: Negative for sore throat.    Respiratory: Positive for shortness of breath. Negative for cough.    Cardiovascular: Positive for chest pain. Negative for palpitations and leg swelling.   Gastrointestinal: Negative for nausea and vomiting.   Genitourinary: Negative for dysuria.   Musculoskeletal: Negative for back pain.   Skin: Negative for rash.   Neurological: Negative for dizziness, weakness and numbness.   Hematological: Does not bruise/bleed easily.   All other systems reviewed and are negative.       Physical Exam     Initial Vitals [10/10/20 2001]   BP Pulse Resp Temp SpO2   (!) 173/92 88 (!) 22 97.8 °F (36.6 °C) 99 %      MAP       --          Physical  Exam  Nursing Notes and Vital Signs Reviewed.  Constitutional: Patient is in mild distress.  Sitting straight up in bed. Anxious, ill appearing  Head: Atraumatic. Normocephalic.  Eyes: PERRL. EOM intact. Conjunctivae are not pale. No scleral icterus.  ENT: Mucous membranes are moist. Oropharynx is clear and symmetric.    Neck: Supple. Full ROM. No lymphadenopathy.  Cardiovascular: Regular rate. Regular rhythm. No murmurs, rubs, or gallops.  Pulmonary/Chest: Occasional wheezing lower lung base. No crackles or rales  Abdominal: Soft and non-distended.  There is no tenderness.  No rebound, guarding, or rigidity. Good bowel sounds.  Genitourinary: No CVA tenderness  Musculoskeletal: Right BKA. Left transmetatarsal amputation of foot. No edema. No calf tenderness.  Skin: Warm and dry.  Neurological:  Alert, awake, and appropriate.  Normal speech.  No acute focal neurological deficits are appreciated.  Psychiatric: Normal affect. Good eye contact. Appropriate in content.     ED Course   Critical Care    Date/Time: 10/11/2020 1:19 AM  Performed by: Barry Timmons MD  Authorized by: Barry Timmons MD   Direct patient critical care time: 12 minutes  Additional history critical care time: 10 minutes  Ordering / reviewing critical care time: 8 minutes  Documentation critical care time: 8 minutes  Consulting other physicians critical care time: 5 minutes  Total critical care time (exclusive of procedural time) : 43 minutes  Critical care time was exclusive of separately billable procedures and treating other patients and teaching time.  Critical care was necessary to treat or prevent imminent or life-threatening deterioration of the following conditions: acute hypoxemic respiratory failure.  Critical care was time spent personally by me on the following activities: blood draw for specimens, development of treatment plan with patient or surrogate, discussions with consultants, interpretation of cardiac output measurements,  "evaluation of patient's response to treatment, examination of patient, obtaining history from patient or surrogate, ordering and performing treatments and interventions, ordering and review of laboratory studies, ordering and review of radiographic studies, pulse oximetry, re-evaluation of patient's condition and review of old charts.        ED Vital Signs:  Vitals:    10/11/20 0030 10/11/20 0100 10/11/20 0130 10/11/20 0200   BP: (!) 173/85 (!) 154/72 (!) 158/77 (!) 153/80   Pulse: 77 75 73 76   Resp: 20 (!) 21 15 17   Temp:       TempSrc:       SpO2: 99% 96% 96% 97%   Weight:       Height:        10/11/20 0230 10/11/20 0300 10/11/20 0333 10/11/20 0734   BP: (!) 161/82 (!) 155/78 (!) 181/84 (!) 169/79   Pulse: 71 70 75 68   Resp: 15 18 20 12   Temp:   97.5 °F (36.4 °C) 98.1 °F (36.7 °C)   TempSrc:   Oral Oral   SpO2: 95% 97% 98% 99%   Weight:       Height:        10/11/20 0800 10/11/20 0900 10/11/20 1100 10/11/20 1132   BP: (!) 169/79   131/64   Pulse:  70 66 63   Resp:    16   Temp:    97.6 °F (36.4 °C)   TempSrc:    Oral   SpO2:    97%   Weight: 78.5 kg (173 lb)      Height: 6' 1" (1.854 m)       10/11/20 1300 10/11/20 1459 10/11/20 1500   BP:      Pulse: 62  66   Resp:      Temp:      TempSrc:      SpO2:  95%    Weight:      Height:          Abnormal Lab Results:  Labs Reviewed   CBC W/ AUTO DIFFERENTIAL - Abnormal; Notable for the following components:       Result Value    RBC 3.72 (*)     Hemoglobin 10.4 (*)     Hematocrit 33.2 (*)     Mean Corpuscular Hemoglobin Conc 31.3 (*)     RDW 16.5 (*)     Immature Granulocytes 0.8 (*)     Immature Grans (Abs) 0.07 (*)     Lymph # 0.9 (*)     Gran% 79.5 (*)     Lymph% 9.7 (*)     All other components within normal limits   COMPREHENSIVE METABOLIC PANEL - Abnormal; Notable for the following components:    Sodium 135 (*)     Chloride 111 (*)     CO2 16 (*)     BUN, Bld 28 (*)     Creatinine 2.1 (*)     Calcium 8.3 (*)     Alkaline Phosphatase 199 (*)     ALT 9 (*)     " eGFR if  36 (*)     eGFR if non  31 (*)     All other components within normal limits   URINALYSIS - Abnormal; Notable for the following components:    Protein, UA 2+ (*)     Occult Blood UA Trace (*)     Leukocytes, UA 2+ (*)     All other components within normal limits   B-TYPE NATRIURETIC PEPTIDE - Abnormal; Notable for the following components:     (*)     All other components within normal limits   URINALYSIS MICROSCOPIC - Abnormal; Notable for the following components:    WBC, UA 15 (*)     WBC Clumps, UA Occasional (*)     All other components within normal limits   D DIMER, QUANTITATIVE - Abnormal; Notable for the following components:    D-Dimer 4.22 (*)     All other components within normal limits   ISTAT PROCEDURE - Abnormal; Notable for the following components:    POC PCO2 26.1 (*)     POC PO2 59 (*)     POC HCO3 14.4 (*)     POC SATURATED O2 89 (*)     All other components within normal limits   TROPONIN I   SARS-COV-2 RNA AMPLIFICATION, QUAL   D DIMER, QUANTITATIVE        All Lab Results:  Results for orders placed or performed during the hospital encounter of 10/10/20   CBC auto differential   Result Value Ref Range    WBC 8.89 3.90 - 12.70 K/uL    RBC 3.72 (L) 4.60 - 6.20 M/uL    Hemoglobin 10.4 (L) 14.0 - 18.0 g/dL    Hematocrit 33.2 (L) 40.0 - 54.0 %    Mean Corpuscular Volume 89 82 - 98 fL    Mean Corpuscular Hemoglobin 28.0 27.0 - 31.0 pg    Mean Corpuscular Hemoglobin Conc 31.3 (L) 32.0 - 36.0 g/dL    RDW 16.5 (H) 11.5 - 14.5 %    Platelets 227 150 - 350 K/uL    MPV 9.4 9.2 - 12.9 fL    Immature Granulocytes 0.8 (H) 0.0 - 0.5 %    Gran # (ANC) 7.1 1.8 - 7.7 K/uL    Immature Grans (Abs) 0.07 (H) 0.00 - 0.04 K/uL    Lymph # 0.9 (L) 1.0 - 4.8 K/uL    Mono # 0.6 0.3 - 1.0 K/uL    Eos # 0.3 0.0 - 0.5 K/uL    Baso # 0.06 0.00 - 0.20 K/uL    nRBC 0 0 /100 WBC    Gran% 79.5 (H) 38.0 - 73.0 %    Lymph% 9.7 (L) 18.0 - 48.0 %    Mono% 6.3 4.0 - 15.0 %     Eosinophil% 3.0 0.0 - 8.0 %    Basophil% 0.7 0.0 - 1.9 %    Differential Method Automated    Comprehensive metabolic panel   Result Value Ref Range    Sodium 135 (L) 136 - 145 mmol/L    Potassium 4.8 3.5 - 5.1 mmol/L    Chloride 111 (H) 95 - 110 mmol/L    CO2 16 (L) 23 - 29 mmol/L    Glucose 100 70 - 110 mg/dL    BUN, Bld 28 (H) 8 - 23 mg/dL    Creatinine 2.1 (H) 0.5 - 1.4 mg/dL    Calcium 8.3 (L) 8.7 - 10.5 mg/dL    Total Protein 8.2 6.0 - 8.4 g/dL    Albumin 3.8 3.5 - 5.2 g/dL    Total Bilirubin 0.5 0.1 - 1.0 mg/dL    Alkaline Phosphatase 199 (H) 55 - 135 U/L    AST 13 10 - 40 U/L    ALT 9 (L) 10 - 44 U/L    Anion Gap 8 8 - 16 mmol/L    eGFR if African American 36 (A) >60 mL/min/1.73 m^2    eGFR if non African American 31 (A) >60 mL/min/1.73 m^2   Urinalysis - Clean Catch   Result Value Ref Range    Specimen UA Urine, Clean Catch     Color, UA Yellow Yellow, Straw, Sneha    Appearance, UA Clear Clear    pH, UA 7.0 5.0 - 8.0    Specific Gravity, UA 1.020 1.005 - 1.030    Protein, UA 2+ (A) Negative    Glucose, UA Negative Negative    Ketones, UA Negative Negative    Bilirubin (UA) Negative Negative    Occult Blood UA Trace (A) Negative    Nitrite, UA Negative Negative    Urobilinogen, UA Negative <2.0 EU/dL    Leukocytes, UA 2+ (A) Negative   B-Type natriuretic peptide (BNP)   Result Value Ref Range     (H) 0 - 99 pg/mL   Troponin I   Result Value Ref Range    Troponin I 0.014 0.000 - 0.026 ng/mL   COVID-19 Rapid Screening   Result Value Ref Range    SARS-CoV-2 RNA, Amplification, Qual Negative Negative   Urinalysis Microscopic   Result Value Ref Range    RBC, UA 3 0 - 4 /hpf    WBC, UA 15 (H) 0 - 5 /hpf    WBC Clumps, UA Occasional (A) None-Rare    Bacteria Rare None-Occ /hpf    Hyaline Casts, UA 0 0-1/lpf /lpf    Microscopic Comment SEE COMMENT    D-Dimer, Quantitative   Result Value Ref Range    D-Dimer 4.22 (H) <0.50 mg/L FEU   CBC auto differential   Result Value Ref Range    WBC 5.11 3.90 - 12.70 K/uL     RBC 3.72 (L) 4.60 - 6.20 M/uL    Hemoglobin 10.5 (L) 14.0 - 18.0 g/dL    Hematocrit 33.5 (L) 40.0 - 54.0 %    Mean Corpuscular Volume 90 82 - 98 fL    Mean Corpuscular Hemoglobin 28.2 27.0 - 31.0 pg    Mean Corpuscular Hemoglobin Conc 31.3 (L) 32.0 - 36.0 g/dL    RDW 16.4 (H) 11.5 - 14.5 %    Platelets 200 150 - 350 K/uL    MPV 9.8 9.2 - 12.9 fL    Immature Granulocytes 0.6 (H) 0.0 - 0.5 %    Gran # (ANC) 3.4 1.8 - 7.7 K/uL    Immature Grans (Abs) 0.03 0.00 - 0.04 K/uL    Lymph # 1.0 1.0 - 4.8 K/uL    Mono # 0.5 0.3 - 1.0 K/uL    Eos # 0.2 0.0 - 0.5 K/uL    Baso # 0.07 0.00 - 0.20 K/uL    nRBC 0 0 /100 WBC    Gran% 65.5 38.0 - 73.0 %    Lymph% 19.2 18.0 - 48.0 %    Mono% 9.4 4.0 - 15.0 %    Eosinophil% 3.9 0.0 - 8.0 %    Basophil% 1.4 0.0 - 1.9 %    Differential Method Automated    Comprehensive metabolic panel   Result Value Ref Range    Sodium 137 136 - 145 mmol/L    Potassium 4.5 3.5 - 5.1 mmol/L    Chloride 111 (H) 95 - 110 mmol/L    CO2 17 (L) 23 - 29 mmol/L    Glucose 86 70 - 110 mg/dL    BUN, Bld 28 (H) 8 - 23 mg/dL    Creatinine 2.0 (H) 0.5 - 1.4 mg/dL    Calcium 8.5 (L) 8.7 - 10.5 mg/dL    Total Protein 8.1 6.0 - 8.4 g/dL    Albumin 3.8 3.5 - 5.2 g/dL    Total Bilirubin 0.4 0.1 - 1.0 mg/dL    Alkaline Phosphatase 199 (H) 55 - 135 U/L    AST 11 10 - 40 U/L    ALT 9 (L) 10 - 44 U/L    Anion Gap 9 8 - 16 mmol/L    eGFR if African American 38 (A) >60 mL/min/1.73 m^2    eGFR if non African American 33 (A) >60 mL/min/1.73 m^2   Procalcitonin   Result Value Ref Range    Procalcitonin 0.05 <0.25 ng/mL   Echo Color Flow Doppler? Yes   Result Value Ref Range    BSA 2.01 m2    TDI SEPTAL 0.08 m/s    LV LATERAL E/E' RATIO 12.09 m/s    LV SEPTAL E/E' RATIO 16.63 m/s    LA WIDTH 4.23 cm    TDI LATERAL 0.11 m/s    LVIDd 5.54 3.5 - 6.0 cm    IVS 1.05 0.6 - 1.1 cm    Posterior Wall 1.01 0.6 - 1.1 cm    LVIDs 3.93 2.1 - 4.0 cm    FS 29 28 - 44 %    LA volume 130.55 cm3    Sinus 3.14 cm    STJ 2.85 cm    Ascending aorta  2.61 cm    LV mass 224.36 g    LA size 5.63 cm    RVDD 2.87 cm    Left Ventricle Relative Wall Thickness 0.36 cm    MV valve area p 1/2 method 2.93 cm2    E/A ratio 1.14     Mean e' 0.10 m/s    E wave decelartion time 258.65 msec    IVRT 111.32 msec    LVOT diameter 2.18 cm    LVOT area 3.7 cm2    E/E' ratio 14.00 m/s    MV Peak E Kenji 1.33 m/s    MV stenosis pressure 1/2 time 75.01 ms    MV Peak A Kenji 1.17 m/s    LV Systolic Volume 67.08 mL    LV Systolic Volume Index 33.2 mL/m2    LV Diastolic Volume 150.02 mL    LV Diastolic Volume Index 74.15 mL/m2    LA Volume Index 64.5 mL/m2    LV Mass Index 111 g/m2    RA Major Axis 5.35 cm    Left Atrium Minor Axis 6.37 cm    Left Atrium Major Axis 6.53 cm    RA Width 3.91 cm    Right Atrial Pressure (from IVC) 3 mmHg   ISTAT PROCEDURE   Result Value Ref Range    POC PH 7.350 7.35 - 7.45    POC PCO2 26.1 (LL) 35 - 45 mmHg    POC PO2 59 (LL) 80 - 100 mmHg    POC HCO3 14.4 (L) 24 - 28 mmol/L    POC BE -11 -2 to 2 mmol/L    POC SATURATED O2 89 (L) 95 - 100 %    Sample ARTERIAL     Site RR     Allens Test Pass     DelSys Room Air     Mode SPONT     FiO2 21          Imaging Results:  Imaging Results          NM Lung Scan Ventilation Perfusion (Final result)  Result time 10/11/20 09:17:18    Final result by Neri Leach MD (10/11/20 09:17:18)                 Impression:      1.  Very low probability V/Q scan for pulmonary embolism.    2.  I agree with the preliminary interpretation rendered      Electronically signed by: Neri Leach MD  Date:    10/11/2020  Time:    09:17             Narrative:    EXAMINATION:  NM LUNG VENTILATION AND PERFUSION IMAGING    CLINICAL HISTORY:  PE suspected, high pretest prob;    TECHNIQUE:  The patient received 28.9 mCi of technetium 99 DTPA for ventilation images and 4.7 mCi of technetium 99 MAA for perfusion images.    COMPARISON:  Chest x-ray performed earlier the same day    FINDINGS:  Both the ventilation and perfusion images demonstrate  homogeneous distribution of the radiopharmaceutical without ventilation or perfusion defects.                               X-Ray Chest AP Portable (Final result)  Result time 10/10/20 20:34:29    Final result by Severo Gonsalves MD (10/10/20 20:34:29)                 Impression:      Edema favored over pneumonia.      Electronically signed by: Severo Gonsalves  Date:    10/10/2020  Time:    20:34             Narrative:    EXAMINATION:  XR CHEST AP PORTABLE    CLINICAL HISTORY:  Chest Pain;    TECHNIQUE:  Single frontal view of the chest was performed.    COMPARISON:  06/12/2020    FINDINGS:  Generalized increased interstitial attenuation.  Small focus of left basilar more solid-appearing opacity possibly alveolar edema with pneumonia not entirely excluded.    The cardiac silhouette is normal in size. The hilar and mediastinal contours are unremarkable.    Degenerative change of the shoulders.                               12:41 AM: Per STAT radiology, pt's VQ scan results: Low probability pulmonary embolus.    The EKG was ordered, reviewed, and independently interpreted by the ED provider.  Interpretation time: 2007  Rate: 84 BPM  Rhythm: undetermined rhythm  Interpretation: No acute ST changes. No STEMI.             The Emergency Provider reviewed the vital signs and test results, which are outlined above.     ED Discussion     12:58 AM: Dr. Jones transfers care of pt to Dr. Timmons, pending radiology results.    1:11 AM: Discussed case with Coni Salgado NP (Hospital Medicine). Dr. Velasquez agrees with current care and management of pt and accepts admission.   Admitting Service: Hospital Medicine  Admitting Physician: Dr. Velasquez  Admit to: [facility]    1:18 AM: Re-evaluated pt. I have discussed test results, shared treatment plan, and the need for admission with patient and family at bedside. Pt and family express understanding at this time and agree with all information. All questions answered. Pt and  family have no further questions or concerns at this time. Pt is ready for admit.                       ED Medication(s):  Medications   nitroGLYCERIN 2% TD oint ointment 1 inch (1 inch Topical (Top) Given 10/10/20 2035)   furosemide injection 40 mg (40 mg Intravenous Given 10/11/20 0159)   cefTRIAXone (ROCEPHIN) 1 g/50 mL D5W IVPB (1 g Intravenous New Bag 10/11/20 0159)   influenza (QUADRIVALENT ADJUVANTED PF) vaccine 0.5 mL (0.5 mLs Intramuscular Given 10/11/20 1512)       Discharge Medication List as of 10/11/2020  3:14 PM          Follow-up Information     Norma Nuñez MD. Schedule an appointment as soon as possible for a visit in 3 days.    Specialty: Family Medicine  Why: Hospital follow up  Contact information:  [address] [address] [id number] [phone]             KEYONNA Schneider. Schedule an appointment as soon as possible for a visit in 1 week.    Specialty: Cardiology  Why: Hospital follow up for CHF  Contact information:  [address]  [place] [address] [phone]                       Scribe Attestation:   Scribe #1: I performed the above scribed service and the documentation accurately describes the services I performed. I attest to the accuracy of the note.     Attending:   Physician Attestation Statement for Scribe #1: I, Beatris Jones MD, personally performed the services described in this documentation, as scribed by Maria Teresa Armstrong, in my presence, and it is both accurate and complete.       Scribe Attestation:   Scribe #2: I performed the above scribed service and the documentation accurately describes the services I performed. I attest to the accuracy of the note.    Attending Attestation:           Physician Attestation for Scribe:    Physician Attestation Statement for Scribe #2: I, Barry Timmons MD, reviewed documentation, as scribed by Mallory Lama in my presence, and it is both accurate and complete. I also acknowledge and confirm the content of the  note done by Scribe #1.           Clinical Impression       ICD-10-CM ICD-9-CM   1. Acute on chronic respiratory failure with hypoxia  J96.21 518.84     799.02   2. SOB (shortness of breath)  R06.02 786.05   3. Shortness of breath  R06.02 786.05   4. Hypoxia  R09.02 799.02   5. Urinary tract infection without hematuria, site unspecified  N39.0 599.0   6. Acute pulmonary edema  J81.0 518.4   7. Chest pain  R07.9 786.50   8. CAD (coronary artery disease)  I25.10 414.00   9. CAD;Old MI ; s/p stents x 3 (2000)   I25.2 412       Disposition:   Disposition: Placed in Observation  Condition: Juan Diego Jones MD  10/12/20 2052

## 2020-10-11 NOTE — HPI
70-year-old male with past medical history of hypertension, hyperlipidemia, coronary artery disease, peripheral artery disease status post right BKA,, CKD stage 4, recently diagnosed with colon adenocarcinoma status post partial colectomy presented because of shortness of breath since yesterday.  Patient was in usual state of health since yesterday he noticed the shortness of breath on exertion which gradually progressed and his condition of breath even at rest.  Patient also had mild cough which is dry.  Denies having any chest pain, palpitations.  Denies having any fevers, chills, nausea, vomiting, wheezing.  He also had a PET scan after colectomy for colon adenocarcinoma and did not show any metastasis.  He follows with the Hematology-Oncology.  Denies having any history of COPD diagnosed the past but was discharged on oxygen previously by pulmonology.  His echo from June did not show any systolic or diastolic dysfunction.   His currently  being managed medically for his coronary disease as he has high risk surgical candidate for CABG.

## 2020-10-11 NOTE — PLAN OF CARE
10/11/20 1516   Final Note   Assessment Type Final Discharge Note   Anticipated Discharge Disposition Home   Right Care Referral Info   Post Acute Recommendation No Care

## 2020-10-11 NOTE — ASSESSMENT & PLAN NOTE
Status post colectomy in June 2020.  PET scan did not show any metastatic lesions.  Follows with Oncology as outpatient

## 2020-10-11 NOTE — ASSESSMENT & PLAN NOTE
Has elevated neck veins on examination.  He has CKD stage 4.  Echo from June did not show any systolic or diastolic dysfunction.  Also his D-dimers were elevated for which a V/Q scan was done reading still pending.  Will check an EKG to interpret for any RV strain  Will diurese him with Lasix 40 mg b.i.d. for 2 doses.  Continue to monitor creatinine.  Also get an echocardiogram.  Shivam AMAYA.  Follow-up on V/Q scan results , especially given his recent diagnosis of colon adenocarcinoma.

## 2020-10-11 NOTE — H&P
[facility] Hospital Medicine  History & Physical    Patient Name: Kodi Franco  MRN: [id number]  Admission Date: 10/10/2020  Attending Physician: Nilay Velasquez MD  Primary Care Provider: Norma Nuñez MD         Patient information was obtained from patient and ER records.     Subjective:     Principal Problem:Hypoxia    Chief Complaint:   Chief Complaint   Patient presents with    Shortness of Breath     80% on 2L home O2. pt arrived on NRB 15L at 98%        HPI:     70-year-old male with past medical history of hypertension, hyperlipidemia, coronary artery disease, peripheral artery disease status post right BKA,, CKD stage 4, recently diagnosed with colon adenocarcinoma status post partial colectomy presented because of shortness of breath since yesterday.  Patient was in usual state of health since yesterday he noticed the shortness of breath on exertion which gradually progressed and his condition of breath even at rest.  Patient also had mild cough which is dry.  Denies having any chest pain, palpitations.  Denies having any fevers, chills, nausea, vomiting, wheezing.  He also had a PET scan after colectomy for colon adenocarcinoma and did not show any metastasis.  He follows with the Hematology-Oncology.  Denies having any history of COPD diagnosed the past but was discharged on oxygen previously by pulmonology.  His echo from June did not show any systolic or diastolic dysfunction.   His currently  being managed medically for his coronary disease as he has high risk surgical candidate for CABG.    Past Medical History:   Diagnosis Date    Acute on chronic congestive heart failure 1/13/2020    Acute respiratory failure with hypoxia 1/14/2020    Analgesic nephropathy     Anemia     AP (angina pectoris) 1/11/2019    Arthritis     Colon polyp     Repeat colonoscopy due in 9/14    COPD exacerbation 2/6/2020    Coronary artery disease     Diverticulosis     colonoscopy 2/21/2014     Dysthymia 2/13/2020    Encounter for blood transfusion     GERD (gastroesophageal reflux disease)     Hemorrhoids     colonoscopy 2/21/2014    Horseshoe kidney     Hyperglycemia 3/17/2014    Hyperlipidemia     Hypertension     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    Late complications of amputation stump     rseolved with further amputation( MRSA then none since 2014)    Lipoma of colon     colonoscopy 2/21/2014    Myocardial infarction     per patient 2000 & 9/2012    Peripheral vascular disease     Phantom limb syndrome     patient reports only intermittent not problematic, not worsening    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    Spinal cord disease     L4L5 disc    Stroke     Tobacco dependence     resolved    Ureteral stent retained        Past Surgical History:   Procedure Laterality Date    ABDOMINAL AORTIC ANEURYSM REPAIR      ABDOMINAL AORTIC ANEURYSM REPAIR  1996/2014    AMPUTATION, LOWER LIMB      AORTA - BILATERAL FEMORAL ARTERY BYPASS GRAFT  2014    Left and right leg    COLONOSCOPY N/A 6/2/2020    Procedure: COLONOSCOPY;  Surgeon: Rosanna Harrington MD;  Location: [facility] ENDO;  Service: Endoscopy;  Laterality: N/A;    COLOSTOMY N/A 6/11/2020    Procedure: CREATION, COLOSTOMY;  Surgeon: Leonardo Yang MD;  Location: [facility] OR;  Service: General;  Laterality: N/A;    CORONARY ANGIOPLASTY WITH STENT PLACEMENT  2000    Three placed in heart    CYSTOSCOPY W/ RETROGRADES Left 5/29/2018    Procedure: CYSTOSCOPY, WITH RETROGRADE PYELOGRAM;  Surgeon: Scooter Jin IV, MD;  Location: [facility] OR;  Service: Urology;  Laterality: Left;    CYSTOSCOPY W/ URETERAL STENT PLACEMENT Left 5/29/2018    Procedure: CYSTOSCOPY, WITH URETERAL STENT INSERTION;  Surgeon: Scooter Jin IV, MD;  Location: [facility] OR;  Service: Urology;  Laterality: Left;    CYSTOSCOPY W/ URETERAL STENT PLACEMENT Left 2/4/2020    Procedure: CYSTOSCOPY, WITH URETERAL STENT INSERTION;  Surgeon: Scooter MENESES  Davin NELSON MD;  Location: [facility] OR;  Service: Urology;  Laterality: Left;    CYSTOSCOPY W/ URETERAL STENT REMOVAL Left 5/29/2018    Procedure: CYSTOSCOPY, WITH URETERAL STENT REMOVAL;  Surgeon: Scooter Jin IV, MD;  Location: [facility] OR;  Service: Urology;  Laterality: Left;    CYSTOSCOPY W/ URETERAL STENT REMOVAL Left 2/4/2020    Procedure: CYSTOSCOPY, WITH URETERAL STENT REMOVAL;  Surgeon: Scooter Jin IV, MD;  Location: [facility] OR;  Service: Urology;  Laterality: Left;    ESOPHAGOGASTRODUODENOSCOPY N/A 6/2/2020    Procedure: EGD (ESOPHAGOGASTRODUODENOSCOPY);  Surgeon: Rosanna Harrington MD;  Location: [facility];  Service: Endoscopy;  Laterality: N/A;    FOOT AMPUTATION THROUGH METATARSAL  1996    left    FOOT SURGERY Bilateral 1980's    per patient multiple toe amputations prior to.  partial foot amputation:first great toe then other toes     INJECTION OF ANESTHETIC AGENT INTO TISSUE PLANE DEFINED BY TRANSVERSUS ABDOMINIS MUSCLE N/A 6/11/2020    Procedure: BLOCK, TRANSVERSUS ABDOMINIS PLANE;  Surgeon: Leonardo Yang MD;  Location: [facility] OR;  Service: General;  Laterality: N/A;    KIDNEY SURGERY  2014    per patient separation of horseshoe kidney @ time of AAA repair    LEFT HEART CATHETERIZATION Left 3/7/2019    Procedure: CATHETERIZATION, HEART, LEFT;  Surgeon: Adriel Boone MD;  Location: [facility] CATH LAB;  Service: Cardiology;  Laterality: Left;  630 admit for IV hydration  10am start    LUNG LOBECTOMY Right 1970s    per patient not cancer    LYSIS OF ADHESIONS N/A 6/11/2020    Procedure: LYSIS, ADHESIONS;  Surgeon: Leonardo Yang MD;  Location: [facility] OR;  Service: General;  Laterality: N/A;    OMENTECTOMY N/A 6/11/2020    Procedure: OMENTECTOMY;  Surgeon: Leonardo Yang MD;  Location: [facility] OR;  Service: General;  Laterality: N/A;    right below knee amputation  2009 (approx)    SMALL INTESTINE SURGERY  2014    per patient partial @ time of aaa repair  not small bowel - large bowel  bowel compromised benson JOSHreji    SUBTOTAL COLECTOMY N/A 6/11/2020    Procedure: COLECTOMY, PARTIAL;  Surgeon: Leonardo Yang MD;  Location: [facility];  Service: General;  Laterality: N/A;    TONSILLECTOMY  1955 aprox    URETERAL STENT PLACEMENT Left     annually replaced since 2012 or so  Dr Jin       Review of patient's allergies indicates:   Allergen Reactions    Morphine Itching       No current facility-administered medications on file prior to encounter.      Current Outpatient Medications on File Prior to Encounter   Medication Sig    amLODIPine (NORVASC) 10 MG tablet TAKE 1 TABLET EVERY DAY    aspirin (ECOTRIN) 81 MG EC tablet Take 1 tablet (81 mg total) by mouth once daily.    atorvastatin (LIPITOR) 40 MG tablet Take 1 tablet (40 mg total) by mouth once daily.    carvedilol (COREG) 25 MG tablet Take 1 tablet (25 mg total) by mouth 2 (two) times daily with meals.    hydrALAZINE (APRESOLINE) 25 MG tablet Take 1 tablet (25 mg total) by mouth every 12 (twelve) hours.    isosorbide mononitrate (IMDUR) 120 MG 24 hr tablet Take 1 tablet (120 mg total) by mouth once daily.    omeprazole (PRILOSEC) 20 MG capsule TAKE 1 CAPSULE TWICE DAILY    albuterol-ipratropium (DUO-NEB) 2.5 mg-0.5 mg/3 mL nebulizer solution Take 3 mLs by nebulization every 8 (eight) hours as needed for Wheezing or Shortness of Breath. Rescue    cetirizine (ZYRTEC) 10 MG tablet Take 10 mg by mouth once daily.    folic acid-vit B6-vit B12 2.5-25-2 mg (FOLBIC OR EQUIV) 2.5-25-2 mg Tab Take 1 tablet by mouth once daily.    HYDROcodone-acetaminophen (NORCO) 7.5-325 mg per tablet Take 1 tablet by mouth every 8 (eight) hours as needed for Pain.    mupirocin (BACTROBAN) 2 % ointment Apply topically 3 (three) times daily.    nitroglycerin (NITROSTAT) 0.6 MG Subl Place 1 tablet (0.6 mg total) under the tongue every 5 (five) minutes as needed (max 3/ per episode).    OXYGEN-AIR DELIVERY SYSTEMS MISC by Misc.(Non-Drug; Combo  Route) route.    sertraline (ZOLOFT) 50 MG tablet Take 1 tablet (50 mg total) by mouth once daily.    triamcinolone acetonide 0.1% (KENALOG) 0.1 % cream Apply topically 2 (two) times daily.     Family History     Problem Relation (Age of Onset)    COPD Mother    Cancer Mother    Diabetes Daughter    Heart disease Father        Tobacco Use    Smoking status: Former Smoker     Packs/day: 1.00     Years: 15.00     Pack years: 15.00     Quit date: 2009     Years since quittin.7    Smokeless tobacco: Never Used   Substance and Sexual Activity    Alcohol use: No    Drug use: No     Comment: Is on prescription opiod, no non prescribed use    Sexual activity: Not Currently     Partners: Female     Review of Systems   Constitutional: Negative.    HENT: Negative.    Eyes: Negative.    Respiratory: Positive for cough and shortness of breath.    Cardiovascular: Negative.    Gastrointestinal: Negative.    Genitourinary: Negative.    Neurological: Negative.    Hematological: Negative.      Objective:     Vital Signs (Most Recent):  Temp: 97.5 °F (36.4 °C) (10/11/20 0333)  Pulse: 75 (10/11/20 0333)  Resp: 20 (10/11/20 0333)  BP: (!) 181/84 (10/11/20 0333)  SpO2: 98 % (10/11/20 0333) Vital Signs (24h Range):  Temp:  [97.5 °F (36.4 °C)-97.8 °F (36.6 °C)] 97.5 °F (36.4 °C)  Pulse:  [70-88] 75  Resp:  [15-22] 20  SpO2:  [90 %-100 %] 98 %  BP: (153-187)/(72-92) 181/84     Weight: 78.5 kg (173 lb)  Body mass index is 22.82 kg/m².    Physical Exam  Constitutional:       Appearance: Normal appearance.   HENT:      Head: Normocephalic and atraumatic.      Nose: Nose normal.      Mouth/Throat:      Mouth: Mucous membranes are moist.   Eyes:      Pupils: Pupils are equal, round, and reactive to light.   Neck:      Musculoskeletal: Normal range of motion.      Comments: JVP present 1 finger of the clavicle and hepatojugular reflux present  Cardiovascular:      Rate and Rhythm: Normal rate and regular rhythm.      Pulses:  Normal pulses.      Heart sounds: Normal heart sounds.      Comments: S4 present  Pulmonary:      Effort: Pulmonary effort is normal.      Breath sounds: Normal breath sounds.      Comments: Decreased bilateral air entry  Abdominal:      General: Abdomen is flat.      Palpations: Abdomen is soft.   Musculoskeletal: Normal range of motion.   Skin:     General: Skin is warm.      Capillary Refill: Capillary refill takes less than 2 seconds.   Neurological:      General: No focal deficit present.      Mental Status: He is alert and oriented to person, place, and time.           CRANIAL NERVES     CN III, IV, VI   Pupils are equal, round, and reactive to light.       Significant Labs: All pertinent labs within the past 24 hours have been reviewed.    Significant Imaging: I have reviewed all pertinent imaging results/findings within the past 24 hours.    Assessment/Plan:     * Hypoxia    Has elevated neck veins on examination.  He has CKD stage 4.  Echo from June did not show any systolic or diastolic dysfunction.  Also his D-dimers were elevated for which a V/Q scan was done reading still pending.  Will check an EKG to interpret for any RV strain  Will diurese him with Lasix 40 mg b.i.d. for 2 doses.  Continue to monitor creatinine.  Also get an echocardiogram.  Shivam ATC.   Do he was not tachycardic on initial presentation given his history of adenocarcinoma , please Follow-up on V/Q scan results        Colon adenocarcinoma    Status post colectomy in June 2020.  PET scan did not show any metastatic lesions.  Follows with Oncology as outpatient    CAD;Old MI ; s/p stents x 3 (2000)     Continue aspirin, Lipitor, Coreg, hydralazine, Isordil    CKD (chronic kidney disease) stage 4, GFR 15-29 ml/min    Acute kidney injury on CKD stage 4.  His baseline creatinine 101.5-1.6.  Presented with a creatinine of 2.1.  Volume up on exam.  Continue to monitor creatinine diuresis      VTE Risk Mitigation (From admission, onward)          Ordered     heparin (porcine) injection 5,000 Units  Every 8 hours      10/11/20 0447     IP VTE HIGH RISK PATIENT  Once      10/11/20 0445     Place sequential compression device  Until discontinued      10/11/20 0445                 Will admit for observation tonight.  Nilay Velasquez MD  Department of Hospital Medicine   [facility]

## 2020-10-11 NOTE — ASSESSMENT & PLAN NOTE
Acute kidney injury on CKD stage 4.  His baseline creatinine 101.5-1.6.  Presented with a creatinine of 2.1.  Volume up on exam.  Continue to monitor creatinine diuresis

## 2020-10-11 NOTE — PLAN OF CARE
Went over discharge instructions with patient at bedside.  Stressed the importance of making and keeping all appointments.  Pt verbalized understanding.   Had all questions regarding discharge answered to satisfaction.  IV removed without complications.  Tele box removed and returned to monitor tech.  Pt d/c on room air.  Flu vaccine given before d/c.  Discharged patient via wheelchair to personal transportation.

## 2020-10-11 NOTE — SUBJECTIVE & OBJECTIVE
Past Medical History:   Diagnosis Date    Acute on chronic congestive heart failure 1/13/2020    Acute respiratory failure with hypoxia 1/14/2020    Analgesic nephropathy     Anemia     AP (angina pectoris) 1/11/2019    Arthritis     Colon polyp     Repeat colonoscopy due in 9/14    COPD exacerbation 2/6/2020    Coronary artery disease     Diverticulosis     colonoscopy 2/21/2014    Dysthymia 2/13/2020    Encounter for blood transfusion     GERD (gastroesophageal reflux disease)     Hemorrhoids     colonoscopy 2/21/2014    Horseshoe kidney     Hyperglycemia 3/17/2014    Hyperlipidemia     Hypertension     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    Late complications of amputation stump     rseolved with further amputation( MRSA then none since 2014)    Lipoma of colon     colonoscopy 2/21/2014    Myocardial infarction     per patient 2000 & 9/2012    Peripheral vascular disease     Phantom limb syndrome     patient reports only intermittent not problematic, not worsening    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    Spinal cord disease     L4L5 disc    Stroke     Tobacco dependence     resolved    Ureteral stent retained        Past Surgical History:   Procedure Laterality Date    ABDOMINAL AORTIC ANEURYSM REPAIR      ABDOMINAL AORTIC ANEURYSM REPAIR  1996/2014    AMPUTATION, LOWER LIMB      AORTA - BILATERAL FEMORAL ARTERY BYPASS GRAFT  2014    Left and right leg    COLONOSCOPY N/A 6/2/2020    Procedure: COLONOSCOPY;  Surgeon: Rosanna Harrington MD;  Location: [facility] ENDO;  Service: Endoscopy;  Laterality: N/A;    COLOSTOMY N/A 6/11/2020    Procedure: CREATION, COLOSTOMY;  Surgeon: Leonardo Yang MD;  Location: [facility] OR;  Service: General;  Laterality: N/A;    CORONARY ANGIOPLASTY WITH STENT PLACEMENT  2000    Three placed in heart    CYSTOSCOPY W/ RETROGRADES Left 5/29/2018    Procedure: CYSTOSCOPY, WITH RETROGRADE PYELOGRAM;  Surgeon: Scooter Jin IV, MD;  Location:  [facility] OR;  Service: Urology;  Laterality: Left;    CYSTOSCOPY W/ URETERAL STENT PLACEMENT Left 5/29/2018    Procedure: CYSTOSCOPY, WITH URETERAL STENT INSERTION;  Surgeon: Scooter Jin IV, MD;  Location: [facility] OR;  Service: Urology;  Laterality: Left;    CYSTOSCOPY W/ URETERAL STENT PLACEMENT Left 2/4/2020    Procedure: CYSTOSCOPY, WITH URETERAL STENT INSERTION;  Surgeon: Scooter Jin IV, MD;  Location: [facility] OR;  Service: Urology;  Laterality: Left;    CYSTOSCOPY W/ URETERAL STENT REMOVAL Left 5/29/2018    Procedure: CYSTOSCOPY, WITH URETERAL STENT REMOVAL;  Surgeon: Scooter Jin IV, MD;  Location: [facility] OR;  Service: Urology;  Laterality: Left;    CYSTOSCOPY W/ URETERAL STENT REMOVAL Left 2/4/2020    Procedure: CYSTOSCOPY, WITH URETERAL STENT REMOVAL;  Surgeon: Scooter Jin IV, MD;  Location: [facility] OR;  Service: Urology;  Laterality: Left;    ESOPHAGOGASTRODUODENOSCOPY N/A 6/2/2020    Procedure: EGD (ESOPHAGOGASTRODUODENOSCOPY);  Surgeon: Rosanna Harrington MD;  Location: [facility] ENDO;  Service: Endoscopy;  Laterality: N/A;    FOOT AMPUTATION THROUGH METATARSAL  1996    left    FOOT SURGERY Bilateral 1980's    per patient multiple toe amputations prior to.  partial foot amputation:first great toe then other toes     INJECTION OF ANESTHETIC AGENT INTO TISSUE PLANE DEFINED BY TRANSVERSUS ABDOMINIS MUSCLE N/A 6/11/2020    Procedure: BLOCK, TRANSVERSUS ABDOMINIS PLANE;  Surgeon: Leonardo Yang MD;  Location: [facility] OR;  Service: General;  Laterality: N/A;    KIDNEY SURGERY  2014    per patient separation of horseshoe kidney @ time of AAA repair    LEFT HEART CATHETERIZATION Left 3/7/2019    Procedure: CATHETERIZATION, HEART, LEFT;  Surgeon: Adriel Boone MD;  Location: [facility] CATH LAB;  Service: Cardiology;  Laterality: Left;  630 admit for IV hydration  10am start    LUNG LOBECTOMY Right 1970s    per patient not cancer    LYSIS OF ADHESIONS N/A 6/11/2020    Procedure: LYSIS, ADHESIONS;   Surgeon: Leonardo Yang MD;  Location: [facility] OR;  Service: General;  Laterality: N/A;    OMENTECTOMY N/A 6/11/2020    Procedure: OMENTECTOMY;  Surgeon: Leonardo Yang MD;  Location: [facility] OR;  Service: General;  Laterality: N/A;    right below knee amputation  2009 (approx)    SMALL INTESTINE SURGERY  2014    per patient partial @ time of aaa repair  not small bowel - large bowel bowel compromised bythtwe AAAbowel    SUBTOTAL COLECTOMY N/A 6/11/2020    Procedure: COLECTOMY, PARTIAL;  Surgeon: Leonardo Yang MD;  Location: [facility] OR;  Service: General;  Laterality: N/A;    TONSILLECTOMY  1955 aprox    URETERAL STENT PLACEMENT Left     annually replaced since 2012 or so  Dr Jin       Review of patient's allergies indicates:   Allergen Reactions    Morphine Itching       No current facility-administered medications on file prior to encounter.      Current Outpatient Medications on File Prior to Encounter   Medication Sig    amLODIPine (NORVASC) 10 MG tablet TAKE 1 TABLET EVERY DAY    aspirin (ECOTRIN) 81 MG EC tablet Take 1 tablet (81 mg total) by mouth once daily.    atorvastatin (LIPITOR) 40 MG tablet Take 1 tablet (40 mg total) by mouth once daily.    carvedilol (COREG) 25 MG tablet Take 1 tablet (25 mg total) by mouth 2 (two) times daily with meals.    hydrALAZINE (APRESOLINE) 25 MG tablet Take 1 tablet (25 mg total) by mouth every 12 (twelve) hours.    isosorbide mononitrate (IMDUR) 120 MG 24 hr tablet Take 1 tablet (120 mg total) by mouth once daily.    omeprazole (PRILOSEC) 20 MG capsule TAKE 1 CAPSULE TWICE DAILY    albuterol-ipratropium (DUO-NEB) 2.5 mg-0.5 mg/3 mL nebulizer solution Take 3 mLs by nebulization every 8 (eight) hours as needed for Wheezing or Shortness of Breath. Rescue    cetirizine (ZYRTEC) 10 MG tablet Take 10 mg by mouth once daily.    folic acid-vit B6-vit B12 2.5-25-2 mg (FOLBIC OR EQUIV) 2.5-25-2 mg Tab Take 1 tablet by mouth once daily.     HYDROcodone-acetaminophen (NORCO) 7.5-325 mg per tablet Take 1 tablet by mouth every 8 (eight) hours as needed for Pain.    mupirocin (BACTROBAN) 2 % ointment Apply topically 3 (three) times daily.    nitroglycerin (NITROSTAT) 0.6 MG Subl Place 1 tablet (0.6 mg total) under the tongue every 5 (five) minutes as needed (max 3/ per episode).    OXYGEN-AIR DELIVERY SYSTEMS MISC by Misc.(Non-Drug; Combo Route) route.    sertraline (ZOLOFT) 50 MG tablet Take 1 tablet (50 mg total) by mouth once daily.    triamcinolone acetonide 0.1% (KENALOG) 0.1 % cream Apply topically 2 (two) times daily.     Family History     Problem Relation (Age of Onset)    COPD Mother    Cancer Mother    Diabetes Daughter    Heart disease Father        Tobacco Use    Smoking status: Former Smoker     Packs/day: 1.00     Years: 15.00     Pack years: 15.00     Quit date: 2009     Years since quittin.7    Smokeless tobacco: Never Used   Substance and Sexual Activity    Alcohol use: No    Drug use: No     Comment: Is on prescription opiod, no non prescribed use    Sexual activity: Not Currently     Partners: Female     Review of Systems   Constitutional: Negative.    HENT: Negative.    Eyes: Negative.    Respiratory: Positive for cough and shortness of breath.    Cardiovascular: Negative.    Gastrointestinal: Negative.    Genitourinary: Negative.    Neurological: Negative.    Hematological: Negative.      Objective:     Vital Signs (Most Recent):  Temp: 97.5 °F (36.4 °C) (10/11/20 0333)  Pulse: 75 (10/11/20 0333)  Resp: 20 (10/11/20 0333)  BP: (!) 181/84 (10/11/20 0333)  SpO2: 98 % (10/11/20 0333) Vital Signs (24h Range):  Temp:  [97.5 °F (36.4 °C)-97.8 °F (36.6 °C)] 97.5 °F (36.4 °C)  Pulse:  [70-88] 75  Resp:  [15-22] 20  SpO2:  [90 %-100 %] 98 %  BP: (153-187)/(72-92) 181/84     Weight: 78.5 kg (173 lb)  Body mass index is 22.82 kg/m².    Physical Exam  Constitutional:       Appearance: Normal appearance.   HENT:      Head:  Normocephalic and atraumatic.      Nose: Nose normal.      Mouth/Throat:      Mouth: Mucous membranes are moist.   Eyes:      Pupils: Pupils are equal, round, and reactive to light.   Neck:      Musculoskeletal: Normal range of motion.      Comments: JVP present 1 finger of the clavicle and hepatojugular reflux present  Cardiovascular:      Rate and Rhythm: Normal rate and regular rhythm.      Pulses: Normal pulses.      Heart sounds: Normal heart sounds.      Comments: S4 present  Pulmonary:      Effort: Pulmonary effort is normal.      Breath sounds: Normal breath sounds.      Comments: Decreased bilateral air entry  Abdominal:      General: Abdomen is flat.      Palpations: Abdomen is soft.   Musculoskeletal: Normal range of motion.   Skin:     General: Skin is warm.      Capillary Refill: Capillary refill takes less than 2 seconds.   Neurological:      General: No focal deficit present.      Mental Status: He is alert and oriented to person, place, and time.           CRANIAL NERVES     CN III, IV, VI   Pupils are equal, round, and reactive to light.       Significant Labs: All pertinent labs within the past 24 hours have been reviewed.    Significant Imaging: I have reviewed all pertinent imaging results/findings within the past 24 hours.

## 2020-10-13 NOTE — HOSPITAL COURSE
70-year-old male with HTN, HLP, CAD, PAD s/p R BKA, CKD stage 4, recently diagnosed with colon adenocarcinoma s/p partial colectomy presented because of shortness of breath since yesterday and admitted as Acute CHF. He was started on IV lasix and he diuresed well. All his Sx have significantly improved. He requested that he be discharged home since he only agreed for admission for a day to receive IV lasix. He already has home O2. He is eating well and feels back to baseline. He was seen and examined and deemed stable for discharge home today.

## 2020-10-13 NOTE — DISCHARGE SUMMARY
[facility] Hospital Medicine  Discharge Summary      Patient Name: Kodi Franco  MRN: [id number]  Admission Date: 10/10/2020  Hospital Length of Stay: 0 days  Discharge Date and Time:  10/12/2020 11:23 PM  Attending Physician: No att. providers found   Discharging Provider: Karla Hutton MD  Primary Care Provider: Norma Nuñez MD      HPI:       70-year-old male with past medical history of hypertension, hyperlipidemia, coronary artery disease, peripheral artery disease status post right BKA,, CKD stage 4, recently diagnosed with colon adenocarcinoma status post partial colectomy presented because of shortness of breath since yesterday.  Patient was in usual state of health since yesterday he noticed the shortness of breath on exertion which gradually progressed and his condition of breath even at rest.  Patient also had mild cough which is dry.  Denies having any chest pain, palpitations.  Denies having any fevers, chills, nausea, vomiting, wheezing.  He also had a PET scan after colectomy for colon adenocarcinoma and did not show any metastasis.  He follows with the Hematology-Oncology.  Denies having any history of COPD diagnosed the past but was discharged on oxygen previously by pulmonology.  His echo from June did not show any systolic or diastolic dysfunction.   His currently  being managed medically for his coronary disease as he has high risk surgical candidate for CABG.    * No surgery found *      Hospital Course:   70-year-old male with HTN, HLP, CAD, PAD s/p R BKA, CKD stage 4, recently diagnosed with colon adenocarcinoma s/p partial colectomy presented because of shortness of breath since yesterday and admitted as Acute CHF. He was started on IV lasix and he diuresed well. All his Sx have significantly improved. He requested that he be discharged home since he only agreed for admission for a day to receive IV lasix. He already has home O2. He is eating well and feels back to  baseline. He was seen and examined and deemed stable for discharge home today.      Consults:     No new Assessment & Plan notes have been filed under this hospital service since the last note was generated.  Service: Hospital Medicine    Final Active Diagnoses:    Diagnosis Date Noted POA    CAD;Old MI ; s/p stents x 3 (2000)  [I25.2] 06/22/2015 Not Applicable     Chronic    CKD (chronic kidney disease) stage 4, GFR 15-29 ml/min [N18.4] 09/04/2014 Yes      Problems Resolved During this Admission:    Diagnosis Date Noted Date Resolved POA    PRINCIPAL PROBLEM:  Acute on chronic respiratory failure with hypoxia [J96.21] 10/11/2020 10/11/2020 Yes    Colon adenocarcinoma [C18.9] 06/09/2020 10/11/2020 Yes       Discharged Condition: stable    Disposition: Home or Self Care    Follow Up:  Follow-up Information     Norma Nuñez MD. Schedule an appointment as soon as possible for a visit in 3 days.    Specialty: Family Medicine  Why: Hospital follow up  Contact information:  [address] [address] [address] [phone]             KEYONNA Schneider. Schedule an appointment as soon as possible for a visit in 1 week.    Specialty: Cardiology  Why: Hospital follow up for CHF  Contact information:  [address]  [place] [address] [phone]                 Patient Instructions:      Diet Cardiac     Activity as tolerated       Significant Diagnostic Studies: Labs:   BMP:   Recent Labs   Lab 10/11/20  0648   GLU 86      K 4.5   *   CO2 17*   BUN 28*   CREATININE 2.0*   CALCIUM 8.5*   , CMP   Recent Labs   Lab 10/11/20  0648      K 4.5   *   CO2 17*   GLU 86   BUN 28*   CREATININE 2.0*   CALCIUM 8.5*   PROT 8.1   ALBUMIN 3.8   BILITOT 0.4   ALKPHOS 199*   AST 11   ALT 9*   ANIONGAP 9   ESTGFRAFRICA 38*   EGFRNONAA 33*   , CBC   Recent Labs   Lab 10/11/20  0648   WBC 5.11   HGB 10.5*   HCT 33.5*      , Troponin   Recent Labs   Lab 10/10/20  2030   TROPONINI  0.014    and All labs within the past 24 hours have been reviewed    Cardiac Graphics: Echocardiogram:   2D echo with color flow doppler:   Results for orders placed or performed during the hospital encounter of 01/13/20   2D echo with color flow doppler   Result Value Ref Range    QEF 55 55 - 65    Est. PA Systolic Pressure 16.32     Narrative    Date of Procedure: 01/14/2020        TEST DESCRIPTION   Technical Quality: This is a portable study performed at the patient's bedside. This is a technically challenging study. There is poor endocardial definition.     Aorta: The aortic root is normal in size, measuring 2.5 cm at sinotubular junction and 2.7 cm at Sinuses of Valsalva.     Left Atrium: The left atrial volume index is normal, measuring 34.42 cc/m2.     Left Ventricle: The left ventricle is normal in size, with an end-diastolic diameter of 4.7 cm, and an end-systolic diameter of 2.7 cm. LV wall thickness is normal, with the septum measuring 0.9 cm and the posterior wall measuring 1.1 cm across. Relative   wall thickness was increased at 0.47, and the LV mass index was 91.5 g/m2 consistent with concentric remodeling. There are no regional wall motion abnormalities. Left ventricular systolic function appears normal. Visually estimated ejection fraction is   55-60%. The LV Doppler derived stroke volume equals 67.0 ccs.     Diastolic indices: E wave velocity 1.1 m/s, E/A ratio 1.5,  msec., E/e' ratio(avg) 10. Diastolic function is indeterminate.     Right Atrium: The right atrium is normal in size, measuring 4.8 cm in length and 4.2 cm in width in the apical view.     Right Ventricle: The right ventricle is normal in size. Global right ventricular systolic function appears normal. The estimated PA systolic pressure is greater than 16 mmHg.     Aortic Valve:  Aortic valve is normal in structure with normal leaflet mobility. The mean gradient obtained across the aortic valve is 3 mmHg.     Mitral Valve:   Mitral valve is normal in structure with normal leaflet mobility. The pressure half time is 95 msec. The calculated mitral valve area is 2.32 cm2.     Tricuspid Valve:  Tricuspid valve is normal in structure with normal leaflet mobility.     Pulmonary Valve:  Pulmonary valve is normal in structure with normal leaflet mobility.     IVC: The IVC is not visualized.     Atrial Septum: The atrial septum is intact.     Intracavitary: There is no evidence of pericardial effusion, intracavity mass, thrombi, or vegetation.         CONCLUSIONS     1 - Concentric remodeling.     2 - No wall motion abnormalities.     3 - Normal left ventricular systolic function (EF 55-60%).     4 - Indeterminate LV diastolic function.     5 - Normal right ventricular systolic function .     6 - The estimated PA systolic pressure is greater than 16 mmHg.             This document has been electronically    SIGNED BY: Kimberly Herring MD On: 01/14/2020 16:17       Pending Diagnostic Studies:     None         Medications:  Reconciled Home Medications:      Medication List      CONTINUE taking these medications    albuterol-ipratropium 2.5 mg-0.5 mg/3 mL nebulizer solution  Commonly known as: DUO-NEB  Take 3 mLs by nebulization every 8 (eight) hours as needed for Wheezing or Shortness of Breath. Rescue     amLODIPine 10 MG tablet  Commonly known as: NORVASC  TAKE 1 TABLET EVERY DAY     aspirin 81 MG EC tablet  Commonly known as: ECOTRIN  Take 1 tablet (81 mg total) by mouth once daily.     atorvastatin 40 MG tablet  Commonly known as: LIPITOR  Take 1 tablet (40 mg total) by mouth once daily.     carvediloL 25 MG tablet  Commonly known as: COREG  Take 1 tablet (25 mg total) by mouth 2 (two) times daily with meals.     cetirizine 10 MG tablet  Commonly known as: ZYRTEC  Take 10 mg by mouth once daily.     folic acid-vit B6-vit B12 2.5-25-2 mg 2.5-25-2 mg Tab  Commonly known as: FOLBIC or Equiv  Take 1 tablet by mouth once daily.     hydrALAZINE 25 MG  tablet  Commonly known as: APRESOLINE  Take 1 tablet (25 mg total) by mouth every 12 (twelve) hours.     HYDROcodone-acetaminophen 7.5-325 mg per tablet  Commonly known as: NORCO  Take 1 tablet by mouth every 8 (eight) hours as needed for Pain.     isosorbide mononitrate 120 MG 24 hr tablet  Commonly known as: IMDUR  Take 1 tablet (120 mg total) by mouth once daily.     mupirocin 2 % ointment  Commonly known as: BACTROBAN  Apply topically 3 (three) times daily.     nitroglycerin 0.6 MG Subl  Commonly known as: NITROSTAT  Place 1 tablet (0.6 mg total) under the tongue every 5 (five) minutes as needed (max 3/ per episode).     omeprazole 20 MG capsule  Commonly known as: PRILOSEC  TAKE 1 CAPSULE TWICE DAILY     OXYGEN-AIR DELIVERY SYSTEMS MISC  by Misc.(Non-Drug; Combo Route) route.     sertraline 50 MG tablet  Commonly known as: ZOLOFT  Take 1 tablet (50 mg total) by mouth once daily.     triamcinolone acetonide 0.1% 0.1 % cream  Commonly known as: KENALOG  Apply topically 2 (two) times daily.            Indwelling Lines/Drains at time of discharge:   Lines/Drains/Airways     Drain                 Colostomy 06/11/20 1209 Transverse  days                Time spent on the discharge of patient: 51 minutes  Patient was seen and examined on the date of discharge and determined to be suitable for discharge.         Karla Hutton MD  Department of Hospital Medicine  [facility]

## 2020-10-15 NOTE — SUBJECTIVE & OBJECTIVE
Interval History: MARCELA is better lower the IVF 50 cc/hour     Review of patient's allergies indicates:   Allergen Reactions    Morphine Itching     Current Facility-Administered Medications   Medication Frequency    acetaminophen tablet 650 mg Q8H PRN    albuterol-ipratropium 2.5 mg-0.5 mg/3 mL nebulizer solution 3 mL Q8H    amitriptyline tablet 25 mg QHS    amLODIPine tablet 10 mg Daily    carvedilol tablet 25 mg BID WM    cefTRIAXone (ROCEPHIN) 2 g in dextrose 5 % 50 mL IVPB Q12H    docusate sodium capsule 100 mg BID    guaiFENesin 12 hr tablet 600 mg BID    heparin (porcine) injection 5,000 Units Q8H    hydrALAZINE tablet 10 mg Q8H    isosorbide mononitrate 24 hr tablet 60 mg Daily    lactated ringers infusion Continuous    ondansetron injection 4 mg Q6H PRN    pantoprazole EC tablet 40 mg Daily    ramelteon tablet 8 mg Nightly PRN    sodium bicarbonate tablet 650 mg TID    traMADol tablet 50 mg Q6H PRN       Objective:     Vital Signs (Most Recent):  Temp: 97.4 °F (36.3 °C) (04/02/19 1238)  Pulse: 65 (04/02/19 1238)  Resp: 16 (04/02/19 1238)  BP: (!) 109/57 (04/02/19 1238)  SpO2: 95 % (04/02/19 1238)  O2 Device (Oxygen Therapy): room air (04/02/19 1238) Vital Signs (24h Range):  Temp:  [97.4 °F (36.3 °C)-98.4 °F (36.9 °C)] 97.4 °F (36.3 °C)  Pulse:  [65-80] 65  Resp:  [16-18] 16  SpO2:  [92 %-97 %] 95 %  BP: (109-183)/(57-81) 109/57     Weight: 87.1 kg (192 lb) (03/27/19 0235)  Body mass index is 25.33 kg/m².  Body surface area is 2.12 meters squared.    I/O last 3 completed shifts:  In: 1663.3 [I.V.:1513.3; IV Piggyback:150]  Out: -     Physical Exam   Constitutional: He appears well-developed and well-nourished.   HENT:   Head: Normocephalic and atraumatic.   Eyes: Pupils are equal, round, and reactive to light. EOM are normal.   Neck: Normal range of motion. No JVD present. Muscular tenderness present. Neck rigidity present.   Cardiovascular: Regular rhythm and intact distal pulses.  Tachycardia present.   Murmur heard.   Systolic murmur is present with a grade of 3/6.  Pulmonary/Chest: Effort normal. No respiratory distress. He has no wheezes. He has rhonchi in the right middle field.   Abdominal: Soft. Bowel sounds are normal.   Musculoskeletal: Normal range of motion. He exhibits no deformity.   Neurological: He is alert. He has normal reflexes.   Skin: Skin is warm and dry. Capillary refill takes 2 to 3 seconds.   Nursing note and vitals reviewed.      Significant Labs:  All labs within the past 24 hours have been reviewed.     Significant Imaging:     No

## 2020-10-20 RX ORDER — CLOPIDOGREL BISULFATE 75 MG/1
75 TABLET ORAL DAILY
Qty: 90 TABLET | Refills: 1 | Status: SHIPPED | OUTPATIENT
Start: 2020-10-20 | End: 2020-10-23

## 2020-10-20 RX ORDER — CLOPIDOGREL BISULFATE 75 MG/1
75 TABLET ORAL DAILY
Qty: 30 TABLET | Refills: 0 | Status: SHIPPED | OUTPATIENT
Start: 2020-10-20 | End: 2020-10-23

## 2020-10-20 NOTE — TELEPHONE ENCOUNTER
----- Message from Yoselin Thornton sent at 10/20/2020 11:31 AM CDT -----  Contact: pt  The pt wants to know why his Clopidogrel 75mg medication was denied, the pt can be reached at [phone]///[id number]

## 2020-10-23 ENCOUNTER — OFFICE VISIT (OUTPATIENT)
Dept: CARDIOLOGY | Facility: CLINIC | Age: 70
End: 2020-10-23
Payer: MEDICARE

## 2020-10-23 VITALS
SYSTOLIC BLOOD PRESSURE: 148 MMHG | HEART RATE: 72 BPM | BODY MASS INDEX: 22.6 KG/M2 | WEIGHT: 171.31 LBS | DIASTOLIC BLOOD PRESSURE: 78 MMHG | OXYGEN SATURATION: 96 %

## 2020-10-23 DIAGNOSIS — I25.2 OLD MI (MYOCARDIAL INFARCTION): Primary | Chronic | ICD-10-CM

## 2020-10-23 DIAGNOSIS — I73.9 PVD (PERIPHERAL VASCULAR DISEASE): Chronic | ICD-10-CM

## 2020-10-23 DIAGNOSIS — I10 ESSENTIAL HYPERTENSION: Chronic | ICD-10-CM

## 2020-10-23 PROCEDURE — 1126F PR PAIN SEVERITY QUANTIFIED, NO PAIN PRESENT: ICD-10-PCS | Mod: HCNC,S$GLB,, | Performed by: NURSE PRACTITIONER

## 2020-10-23 PROCEDURE — 1126F AMNT PAIN NOTED NONE PRSNT: CPT | Mod: HCNC,S$GLB,, | Performed by: NURSE PRACTITIONER

## 2020-10-23 PROCEDURE — 1159F MED LIST DOCD IN RCRD: CPT | Mod: HCNC,S$GLB,, | Performed by: NURSE PRACTITIONER

## 2020-10-23 PROCEDURE — 3077F SYST BP >= 140 MM HG: CPT | Mod: HCNC,CPTII,S$GLB, | Performed by: NURSE PRACTITIONER

## 2020-10-23 PROCEDURE — 1159F PR MEDICATION LIST DOCUMENTED IN MEDICAL RECORD: ICD-10-PCS | Mod: HCNC,S$GLB,, | Performed by: NURSE PRACTITIONER

## 2020-10-23 PROCEDURE — 3077F PR MOST RECENT SYSTOLIC BLOOD PRESSURE >= 140 MM HG: ICD-10-PCS | Mod: HCNC,CPTII,S$GLB, | Performed by: NURSE PRACTITIONER

## 2020-10-23 PROCEDURE — 3078F PR MOST RECENT DIASTOLIC BLOOD PRESSURE < 80 MM HG: ICD-10-PCS | Mod: HCNC,CPTII,S$GLB, | Performed by: NURSE PRACTITIONER

## 2020-10-23 PROCEDURE — 3008F PR BODY MASS INDEX (BMI) DOCUMENTED: ICD-10-PCS | Mod: HCNC,CPTII,S$GLB, | Performed by: NURSE PRACTITIONER

## 2020-10-23 PROCEDURE — 99499 RISK ADDL DX/OHS AUDIT: ICD-10-PCS | Mod: S$GLB,,, | Performed by: NURSE PRACTITIONER

## 2020-10-23 PROCEDURE — 1101F PR PT FALLS ASSESS DOC 0-1 FALLS W/OUT INJ PAST YR: ICD-10-PCS | Mod: HCNC,CPTII,S$GLB, | Performed by: NURSE PRACTITIONER

## 2020-10-23 PROCEDURE — 99999 PR PBB SHADOW E&M-EST. PATIENT-LVL IV: ICD-10-PCS | Mod: PBBFAC,HCNC,, | Performed by: NURSE PRACTITIONER

## 2020-10-23 PROCEDURE — 1101F PT FALLS ASSESS-DOCD LE1/YR: CPT | Mod: HCNC,CPTII,S$GLB, | Performed by: NURSE PRACTITIONER

## 2020-10-23 PROCEDURE — 3078F DIAST BP <80 MM HG: CPT | Mod: HCNC,CPTII,S$GLB, | Performed by: NURSE PRACTITIONER

## 2020-10-23 PROCEDURE — 99999 PR PBB SHADOW E&M-EST. PATIENT-LVL IV: CPT | Mod: PBBFAC,HCNC,, | Performed by: NURSE PRACTITIONER

## 2020-10-23 PROCEDURE — 99214 PR OFFICE/OUTPT VISIT, EST, LEVL IV, 30-39 MIN: ICD-10-PCS | Mod: HCNC,S$GLB,, | Performed by: NURSE PRACTITIONER

## 2020-10-23 PROCEDURE — 99499 UNLISTED E&M SERVICE: CPT | Mod: S$GLB,,, | Performed by: NURSE PRACTITIONER

## 2020-10-23 PROCEDURE — 3008F BODY MASS INDEX DOCD: CPT | Mod: HCNC,CPTII,S$GLB, | Performed by: NURSE PRACTITIONER

## 2020-10-23 PROCEDURE — 99214 OFFICE O/P EST MOD 30 MIN: CPT | Mod: HCNC,S$GLB,, | Performed by: NURSE PRACTITIONER

## 2020-10-23 NOTE — PROGRESS NOTES
Subjective:   Patient ID:  Kodi Franco is a 70 y.o. male who presents for follow up of Coronary Artery Disease      HPI   Mr. Ross's current medical problems include CADold MI with PCI in 2000, AAA s/p repair, CKD, iron deficiency anemia in which he receives iron infusions, Carotid disease s/p L CEA (2017), PAD with MRSA s/p axilla femoral bypass in 2014 and s/p RLE BKA and left forefoot amputation. Treated for encephalopathy/ ? meningitis  in August/Sept 2019.     Has history of GI bleeding with multiple EGDs and colonoscopies between 2007 through 2014 and has a mention there was no obvious source for bleeding was identified  Underwent LHC in March 2019 that showed 3 vessel CAD, (critical stenosis to mid LAD, left CX and ostial RCA). Referred to Dr. Ibarra for possible CABG, but deemed to be high risk, so treated medically. Imdur 60mg started.     PET stress in Oct 2019 showed small sized, mild intensity, mid to apical anteroseptal stress induced perfusion abnormality in the distribution of the mid LAD and a small sized, mild intensity, mid to distal inferior an inferoseptal stress induced perfusion abnormality in the distribution of the  RCA territory. Findings discussed with Dr. Brendan Mancia at [facility] and offered attempted PCI vs. Med mgmt vamshi given his CKD and risk for nephropathy.     Patient opted for med mgmt with ASA, Plavix, Statin, BB, Imdur, amlodipine and Imdur.     He followed up in January 2020 with Dr. Mancia and had no angina symptoms, but had a hospital admission for decompensated HF. Med mgmt continued.    Feb 2020 admitted with severe left sided hydronephrosis, MARCELA, anemia that required transfusion, pleural effusion     March 2020 admitted with SBO and anemia.  Improved with NGT decompression. Negative small bowel follow through. Transfused 1 unit.     June 2020 admitted with lower GIB. Underwent colonoscopy that revealed +malignant partially obstructing tumor in the descending colon.  Recommendation made to hold plavix indefinitely. Is taking ASA 81mg daily    Required Partial small bowel resection of adenocarcinoma, lysis of adhesions creatine of colostomy   Transfused 3 units of PRBC     October 2020   He is following up today. Admitted earlier this month with acute CHF.  . Creatine stable at 2.2. Diuresed with IV lasix. Echo LVEF 55%, severe LAE. H/H is stable.   Has not had to take any SL nitro    Past Medical History:   Diagnosis Date    Acute on chronic congestive heart failure 1/13/2020    Acute respiratory failure with hypoxia 1/14/2020    Analgesic nephropathy     Anemia     AP (angina pectoris) 1/11/2019    Arthritis     Colon polyp     Repeat colonoscopy due in 9/14    COPD exacerbation 2/6/2020    Coronary artery disease     Diverticulosis     colonoscopy 2/21/2014    Dysthymia 2/13/2020    Encounter for blood transfusion     GERD (gastroesophageal reflux disease)     Hemorrhoids     colonoscopy 2/21/2014    Horseshoe kidney     Hyperglycemia 3/17/2014    Hyperlipidemia     Hypertension     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    Late complications of amputation stump     rseolved with further amputation( MRSA then none since 2014)    Lipoma of colon     colonoscopy 2/21/2014    Myocardial infarction     per patient 2000 & 9/2012    Peripheral vascular disease     Phantom limb syndrome     patient reports only intermittent not problematic, not worsening    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    Spinal cord disease     L4L5 disc    Stroke     Tobacco dependence     resolved    Ureteral stent retained        Past Surgical History:   Procedure Laterality Date    ABDOMINAL AORTIC ANEURYSM REPAIR      ABDOMINAL AORTIC ANEURYSM REPAIR  1996/2014    AMPUTATION, LOWER LIMB      AORTA - BILATERAL FEMORAL ARTERY BYPASS GRAFT  2014    Left and right leg    COLONOSCOPY N/A 6/2/2020    Procedure: COLONOSCOPY;  Surgeon: Rosanna Harrington MD;   Location: [facility] ENDO;  Service: Endoscopy;  Laterality: N/A;    COLOSTOMY N/A 6/11/2020    Procedure: CREATION, COLOSTOMY;  Surgeon: Leonardo Yang MD;  Location: [facility];  Service: General;  Laterality: N/A;    CORONARY ANGIOPLASTY WITH STENT PLACEMENT  2000    Three placed in heart    CYSTOSCOPY W/ RETROGRADES Left 5/29/2018    Procedure: CYSTOSCOPY, WITH RETROGRADE PYELOGRAM;  Surgeon: Scooter Jin IV, MD;  Location: [facility];  Service: Urology;  Laterality: Left;    CYSTOSCOPY W/ URETERAL STENT PLACEMENT Left 5/29/2018    Procedure: CYSTOSCOPY, WITH URETERAL STENT INSERTION;  Surgeon: Scooter Jin IV, MD;  Location: [facility] OR;  Service: Urology;  Laterality: Left;    CYSTOSCOPY W/ URETERAL STENT PLACEMENT Left 2/4/2020    Procedure: CYSTOSCOPY, WITH URETERAL STENT INSERTION;  Surgeon: Scooter Jin IV, MD;  Location: [facility];  Service: Urology;  Laterality: Left;    CYSTOSCOPY W/ URETERAL STENT REMOVAL Left 5/29/2018    Procedure: CYSTOSCOPY, WITH URETERAL STENT REMOVAL;  Surgeon: Scooter Jin IV, MD;  Location: [facility];  Service: Urology;  Laterality: Left;    CYSTOSCOPY W/ URETERAL STENT REMOVAL Left 2/4/2020    Procedure: CYSTOSCOPY, WITH URETERAL STENT REMOVAL;  Surgeon: Scooter Jin IV, MD;  Location: [facility];  Service: Urology;  Laterality: Left;    ESOPHAGOGASTRODUODENOSCOPY N/A 6/2/2020    Procedure: EGD (ESOPHAGOGASTRODUODENOSCOPY);  Surgeon: Rosanna Harrington MD;  Location: [facility];  Service: Endoscopy;  Laterality: N/A;    FOOT AMPUTATION THROUGH METATARSAL  1996    left    FOOT SURGERY Bilateral 1980's    per patient multiple toe amputations prior to.  partial foot amputation:first great toe then other toes     INJECTION OF ANESTHETIC AGENT INTO TISSUE PLANE DEFINED BY TRANSVERSUS ABDOMINIS MUSCLE N/A 6/11/2020    Procedure: BLOCK, TRANSVERSUS ABDOMINIS PLANE;  Surgeon: Leonardo Yang MD;  Location: [facility];  Service: General;  Laterality: N/A;    KIDNEY  SURGERY  2014    per patient separation of horseshoe kidney @ time of AAA repair    LEFT HEART CATHETERIZATION Left 3/7/2019    Procedure: CATHETERIZATION, HEART, LEFT;  Surgeon: Adriel Boone MD;  Location: [facility] CATH LAB;  Service: Cardiology;  Laterality: Left;  630 admit for IV hydration  10am start    LUNG LOBECTOMY Right 1970s    per patient not cancer    LYSIS OF ADHESIONS N/A 2020    Procedure: LYSIS, ADHESIONS;  Surgeon: Leonardo Yang MD;  Location: [facility] OR;  Service: General;  Laterality: N/A;    OMENTECTOMY N/A 2020    Procedure: OMENTECTOMY;  Surgeon: Leonardo Yang MD;  Location: [facility] OR;  Service: General;  Laterality: N/A;    right below knee amputation   (approx)    SMALL INTESTINE SURGERY      per patient partial @ time of aaa repair  not small bowel - large bowel bowel compromised bythtwe AAAbowel    SUBTOTAL COLECTOMY N/A 2020    Procedure: COLECTOMY, PARTIAL;  Surgeon: Leonardo Yang MD;  Location: [facility] OR;  Service: General;  Laterality: N/A;    TONSILLECTOMY   aprox    URETERAL STENT PLACEMENT Left     annually replaced since  or so  Dr Jin       Social History     Tobacco Use    Smoking status: Former Smoker     Packs/day: 1.00     Years: 15.00     Pack years: 15.00     Quit date: 2009     Years since quittin.8    Smokeless tobacco: Never Used   Substance Use Topics    Alcohol use: No    Drug use: No     Comment: Is on prescription opiod, no non prescribed use       Family History   Problem Relation Age of Onset    Cancer Mother         lung    COPD Mother     Heart disease Father         MI but per patient bc of old age    Diabetes Daughter     Eczema Neg Hx     Lupus Neg Hx     Psoriasis Neg Hx     Melanoma Neg Hx     Kidney disease Neg Hx     Stroke Neg Hx     Mental illness Neg Hx     Mental retardation Neg Hx     Hypertension Neg Hx     Hyperlipidemia Neg Hx     Drug abuse Neg Hx     Alcohol abuse Neg  Hx     Depression Neg Hx        Current Outpatient Medications   Medication Sig    albuterol-ipratropium (DUO-NEB) 2.5 mg-0.5 mg/3 mL nebulizer solution Take 3 mLs by nebulization every 8 (eight) hours as needed for Wheezing or Shortness of Breath. Rescue    amLODIPine (NORVASC) 10 MG tablet TAKE 1 TABLET EVERY DAY    aspirin (ECOTRIN) 81 MG EC tablet Take 1 tablet (81 mg total) by mouth once daily.    atorvastatin (LIPITOR) 40 MG tablet Take 1 tablet (40 mg total) by mouth once daily.    carvedilol (COREG) 25 MG tablet Take 1 tablet (25 mg total) by mouth 2 (two) times daily with meals.    cetirizine (ZYRTEC) 10 MG tablet Take 10 mg by mouth once daily.    folic acid-vit B6-vit B12 2.5-25-2 mg (FOLBIC OR EQUIV) 2.5-25-2 mg Tab Take 1 tablet by mouth once daily.    hydrALAZINE (APRESOLINE) 25 MG tablet Take 1 tablet (25 mg total) by mouth every 12 (twelve) hours.    HYDROcodone-acetaminophen (NORCO) 7.5-325 mg per tablet Take 1 tablet by mouth every 8 (eight) hours as needed for Pain.    isosorbide mononitrate (IMDUR) 120 MG 24 hr tablet Take 1 tablet (120 mg total) by mouth once daily.    mupirocin (BACTROBAN) 2 % ointment Apply topically 3 (three) times daily.    nitroglycerin (NITROSTAT) 0.6 MG Subl Place 1 tablet (0.6 mg total) under the tongue every 5 (five) minutes as needed (max 3/ per episode).    omeprazole (PRILOSEC) 20 MG capsule TAKE 1 CAPSULE TWICE DAILY    OXYGEN-AIR DELIVERY SYSTEMS MISC by Misc.(Non-Drug; Combo Route) route.    sertraline (ZOLOFT) 50 MG tablet Take 1 tablet (50 mg total) by mouth once daily.    triamcinolone acetonide 0.1% (KENALOG) 0.1 % cream Apply topically 2 (two) times daily.     No current facility-administered medications for this visit.      Current Outpatient Medications on File Prior to Visit   Medication Sig    albuterol-ipratropium (DUO-NEB) 2.5 mg-0.5 mg/3 mL nebulizer solution Take 3 mLs by nebulization every 8 (eight) hours as needed for Wheezing or  Shortness of Breath. Rescue    amLODIPine (NORVASC) 10 MG tablet TAKE 1 TABLET EVERY DAY    aspirin (ECOTRIN) 81 MG EC tablet Take 1 tablet (81 mg total) by mouth once daily.    atorvastatin (LIPITOR) 40 MG tablet Take 1 tablet (40 mg total) by mouth once daily.    carvedilol (COREG) 25 MG tablet Take 1 tablet (25 mg total) by mouth 2 (two) times daily with meals.    cetirizine (ZYRTEC) 10 MG tablet Take 10 mg by mouth once daily.    folic acid-vit B6-vit B12 2.5-25-2 mg (FOLBIC OR EQUIV) 2.5-25-2 mg Tab Take 1 tablet by mouth once daily.    hydrALAZINE (APRESOLINE) 25 MG tablet Take 1 tablet (25 mg total) by mouth every 12 (twelve) hours.    HYDROcodone-acetaminophen (NORCO) 7.5-325 mg per tablet Take 1 tablet by mouth every 8 (eight) hours as needed for Pain.    isosorbide mononitrate (IMDUR) 120 MG 24 hr tablet Take 1 tablet (120 mg total) by mouth once daily.    mupirocin (BACTROBAN) 2 % ointment Apply topically 3 (three) times daily.    nitroglycerin (NITROSTAT) 0.6 MG Subl Place 1 tablet (0.6 mg total) under the tongue every 5 (five) minutes as needed (max 3/ per episode).    omeprazole (PRILOSEC) 20 MG capsule TAKE 1 CAPSULE TWICE DAILY    OXYGEN-AIR DELIVERY SYSTEMS MISC by [facility]Non-Drug; Combo Route) route.    sertraline (ZOLOFT) 50 MG tablet Take 1 tablet (50 mg total) by mouth once daily.    triamcinolone acetonide 0.1% (KENALOG) 0.1 % cream Apply topically 2 (two) times daily.    [DISCONTINUED] clopidogreL (PLAVIX) 75 mg tablet Take 1 tablet (75 mg total) by mouth once daily.    [DISCONTINUED] clopidogreL (PLAVIX) 75 mg tablet Take 1 tablet (75 mg total) by mouth once daily.     No current facility-administered medications on file prior to visit.        Review of Systems   Constitution: Positive for malaise/fatigue. Negative for diaphoresis, weight gain and weight loss.   HENT: Negative for congestion and nosebleeds.    Cardiovascular: Negative for chest pain, claudication, cyanosis,  dyspnea on exertion, irregular heartbeat, leg swelling, near-syncope, orthopnea, palpitations, paroxysmal nocturnal dyspnea and syncope.   Respiratory: Negative for cough, hemoptysis, shortness of breath, sleep disturbances due to breathing, snoring, sputum production and wheezing.    Hematologic/Lymphatic: Negative for bleeding problem. Does not bruise/bleed easily.   Skin: Negative for rash.   Musculoskeletal: Negative for arthritis, back pain, falls, joint pain, muscle cramps and muscle weakness.        Right leg prosthetic    Gastrointestinal: Negative for abdominal pain, constipation, diarrhea, heartburn, hematemesis, hematochezia, melena, nausea and vomiting.        Colostomy    Genitourinary: Negative for dysuria, hematuria and nocturia.   Neurological: Negative for excessive daytime sleepiness, dizziness, headaches, light-headedness, loss of balance, numbness, vertigo and weakness.       Objective:   Physical Exam   Constitutional: He is oriented to person, place, and time. He appears well-developed and well-nourished.   Neck: Neck supple. No JVD present.   Cardiovascular: Normal rate, regular rhythm, normal heart sounds and normal pulses. Exam reveals no friction rub.   No murmur heard.  Pulmonary/Chest: Effort normal and breath sounds normal. No respiratory distress. He has no wheezes. He has no rales.   Abdominal: Soft. Bowel sounds are normal. He exhibits no distension.   Colostomy      Musculoskeletal:         General: No tenderness or edema.      Comments: Right BKA and prosthesis.   Left leg brace    Neurological: He is alert and oriented to person, place, and time.   Skin: Skin is warm and dry. No rash noted.   Psychiatric: He has a normal mood and affect. His behavior is normal.   Nursing note and vitals reviewed.    Vitals:    10/23/20 1450   BP: (!) 148/78   BP Location: Left arm   Patient Position: Sitting   Pulse: 72   SpO2: 96%   Weight: 77.7 kg (171 lb 4.8 oz)     Lab Results   Component Value  Date    CHOL 117 (L) 04/15/2020    CHOL 97 (L) 11/14/2019    CHOL 71 (L) 03/27/2019     Lab Results   Component Value Date    HDL 55 04/15/2020    HDL 27 (L) 11/14/2019    HDL 20 (L) 03/27/2019     Lab Results   Component Value Date    LDLCALC 50.6 (L) 04/15/2020    LDLCALC 51.4 (L) 11/14/2019    LDLCALC 29.8 (L) 03/27/2019     Lab Results   Component Value Date    TRIG 57 04/15/2020    TRIG 93 11/14/2019    TRIG 106 03/27/2019     Lab Results   Component Value Date    CHOLHDL 47.0 04/15/2020    CHOLHDL 27.8 11/14/2019    CHOLHDL 28.2 03/27/2019       Chemistry        Component Value Date/Time     10/11/2020 0648    K 4.5 10/11/2020 0648     (H) 10/11/2020 0648    CO2 17 (L) 10/11/2020 0648    BUN 28 (H) 10/11/2020 0648    CREATININE 2.0 (H) 10/11/2020 0648    GLU 86 10/11/2020 0648        Component Value Date/Time    CALCIUM 8.5 (L) 10/11/2020 0648    ALKPHOS 199 (H) 10/11/2020 0648    AST 11 10/11/2020 0648    ALT 9 (L) 10/11/2020 0648    BILITOT 0.4 10/11/2020 0648    ESTGFRAFRICA 38 (A) 10/11/2020 0648    EGFRNONAA 33 (A) 10/11/2020 0648          Lab Results   Component Value Date    TSH 1.747 03/27/2019     Lab Results   Component Value Date    INR 1.0 06/14/2020    INR 1.0 06/09/2020    INR 1.1 03/27/2019     Lab Results   Component Value Date    WBC 5.11 10/11/2020    HGB 10.5 (L) 10/11/2020    HCT 33.5 (L) 10/11/2020    MCV 90 10/11/2020     10/11/2020     BMP  Sodium   Date Value Ref Range Status   10/11/2020 137 136 - 145 mmol/L Final     Potassium   Date Value Ref Range Status   10/11/2020 4.5 3.5 - 5.1 mmol/L Final     Chloride   Date Value Ref Range Status   10/11/2020 111 (H) 95 - 110 mmol/L Final     CO2   Date Value Ref Range Status   10/11/2020 17 (L) 23 - 29 mmol/L Final     BUN, Bld   Date Value Ref Range Status   10/11/2020 28 (H) 8 - 23 mg/dL Final     Creatinine   Date Value Ref Range Status   10/11/2020 2.0 (H) 0.5 - 1.4 mg/dL Final     Calcium   Date Value Ref Range Status    10/11/2020 8.5 (L) 8.7 - 10.5 mg/dL Final     Anion Gap   Date Value Ref Range Status   10/11/2020 9 8 - 16 mmol/L Final     eGFR if    Date Value Ref Range Status   10/11/2020 38 (A) >60 mL/min/1.73 m^2 Final     eGFR if non    Date Value Ref Range Status   10/11/2020 33 (A) >60 mL/min/1.73 m^2 Final     Comment:     Calculation used to obtain the estimated glomerular filtration  rate (eGFR) is the CKD-EPI equation.        CrCl cannot be calculated (Patient's most recent lab result is older than the maximum 7 days allowed.).    Assessment:     1. CAD;Old MI ; s/p stents x 3 (2000)     2. Essential hypertension    3. PVD (peripheral vascular disease)        Plan:   Continue medical management for CAD due to being turned down for bypass  Continue ASA. Will discuss resuming Plavix if ok with GI   Continue Statin, BB, Imdur amlodipine    Cardiac diet  Follow up with Dr. Jamil as scheduled for PAD and carotid disease     RTC in 6 months or sooner if needed with Dr. Boone.

## 2020-11-06 ENCOUNTER — LAB VISIT (OUTPATIENT)
Dept: LAB | Facility: HOSPITAL | Age: 70
End: 2020-11-06
Attending: INTERNAL MEDICINE
Payer: MEDICARE

## 2020-11-06 DIAGNOSIS — C18.9 COLON ADENOCARCINOMA: ICD-10-CM

## 2020-11-06 DIAGNOSIS — D50.0 IRON DEFICIENCY ANEMIA DUE TO CHRONIC BLOOD LOSS: ICD-10-CM

## 2020-11-06 LAB
ALBUMIN SERPL BCP-MCNC: 3.5 G/DL (ref 3.5–5.2)
ALP SERPL-CCNC: 208 U/L (ref 55–135)
ALT SERPL W/O P-5'-P-CCNC: 7 U/L (ref 10–44)
ANION GAP SERPL CALC-SCNC: 9 MMOL/L (ref 8–16)
AST SERPL-CCNC: 11 U/L (ref 10–40)
BASOPHILS # BLD AUTO: 0.06 K/UL (ref 0–0.2)
BASOPHILS NFR BLD: 1 % (ref 0–1.9)
BILIRUB SERPL-MCNC: 0.5 MG/DL (ref 0.1–1)
BUN SERPL-MCNC: 26 MG/DL (ref 8–23)
CALCIUM SERPL-MCNC: 8.5 MG/DL (ref 8.7–10.5)
CHLORIDE SERPL-SCNC: 109 MMOL/L (ref 95–110)
CO2 SERPL-SCNC: 18 MMOL/L (ref 23–29)
CREAT SERPL-MCNC: 2.2 MG/DL (ref 0.5–1.4)
DIFFERENTIAL METHOD: ABNORMAL
EOSINOPHIL # BLD AUTO: 0.3 K/UL (ref 0–0.5)
EOSINOPHIL NFR BLD: 5.1 % (ref 0–8)
ERYTHROCYTE [DISTWIDTH] IN BLOOD BY AUTOMATED COUNT: 15.4 % (ref 11.5–14.5)
EST. GFR  (AFRICAN AMERICAN): 33.6 ML/MIN/1.73 M^2
EST. GFR  (NON AFRICAN AMERICAN): 29.1 ML/MIN/1.73 M^2
FERRITIN SERPL-MCNC: 335 NG/ML (ref 20–300)
GLUCOSE SERPL-MCNC: 87 MG/DL (ref 70–110)
HCT VFR BLD AUTO: 32.8 % (ref 40–54)
HGB BLD-MCNC: 10.1 G/DL (ref 14–18)
IMM GRANULOCYTES # BLD AUTO: 0.01 K/UL (ref 0–0.04)
IMM GRANULOCYTES NFR BLD AUTO: 0.2 % (ref 0–0.5)
IRON SERPL-MCNC: 51 UG/DL (ref 45–160)
LYMPHOCYTES # BLD AUTO: 0.8 K/UL (ref 1–4.8)
LYMPHOCYTES NFR BLD: 14 % (ref 18–48)
MCH RBC QN AUTO: 28.3 PG (ref 27–31)
MCHC RBC AUTO-ENTMCNC: 30.8 G/DL (ref 32–36)
MCV RBC AUTO: 92 FL (ref 82–98)
MONOCYTES # BLD AUTO: 0.5 K/UL (ref 0.3–1)
MONOCYTES NFR BLD: 8.5 % (ref 4–15)
NEUTROPHILS # BLD AUTO: 4.3 K/UL (ref 1.8–7.7)
NEUTROPHILS NFR BLD: 71.2 % (ref 38–73)
NRBC BLD-RTO: 0 /100 WBC
PLATELET # BLD AUTO: 201 K/UL (ref 150–350)
PMV BLD AUTO: 9.9 FL (ref 9.2–12.9)
POTASSIUM SERPL-SCNC: 5.1 MMOL/L (ref 3.5–5.1)
PROT SERPL-MCNC: 8.1 G/DL (ref 6–8.4)
RBC # BLD AUTO: 3.57 M/UL (ref 4.6–6.2)
SATURATED IRON: 22 % (ref 20–50)
SODIUM SERPL-SCNC: 136 MMOL/L (ref 136–145)
TOTAL IRON BINDING CAPACITY: 234 UG/DL (ref 250–450)
TRANSFERRIN SERPL-MCNC: 158 MG/DL (ref 200–375)
WBC # BLD AUTO: 6.02 K/UL (ref 3.9–12.7)

## 2020-11-06 PROCEDURE — 85025 COMPLETE CBC W/AUTO DIFF WBC: CPT | Mod: HCNC

## 2020-11-06 PROCEDURE — 83540 ASSAY OF IRON: CPT | Mod: HCNC

## 2020-11-06 PROCEDURE — 80053 COMPREHEN METABOLIC PANEL: CPT | Mod: HCNC

## 2020-11-06 PROCEDURE — 36415 COLL VENOUS BLD VENIPUNCTURE: CPT | Mod: HCNC,PO

## 2020-11-06 PROCEDURE — 82728 ASSAY OF FERRITIN: CPT | Mod: HCNC

## 2020-11-13 ENCOUNTER — OFFICE VISIT (OUTPATIENT)
Dept: HEMATOLOGY/ONCOLOGY | Facility: CLINIC | Age: 70
End: 2020-11-13
Payer: MEDICARE

## 2020-11-13 ENCOUNTER — TELEPHONE (OUTPATIENT)
Dept: RADIOLOGY | Facility: HOSPITAL | Age: 70
End: 2020-11-13

## 2020-11-13 VITALS
BODY MASS INDEX: 22.76 KG/M2 | SYSTOLIC BLOOD PRESSURE: 157 MMHG | OXYGEN SATURATION: 98 % | TEMPERATURE: 98 F | DIASTOLIC BLOOD PRESSURE: 69 MMHG | WEIGHT: 171.75 LBS | HEART RATE: 65 BPM | RESPIRATION RATE: 16 BRPM | HEIGHT: 73 IN

## 2020-11-13 DIAGNOSIS — R91.8 PULMONARY NODULES/LESIONS, MULTIPLE: ICD-10-CM

## 2020-11-13 DIAGNOSIS — C18.9 COLON ADENOCARCINOMA: Primary | ICD-10-CM

## 2020-11-13 PROCEDURE — 99999 PR PBB SHADOW E&M-EST. PATIENT-LVL IV: ICD-10-PCS | Mod: PBBFAC,HCNC,, | Performed by: INTERNAL MEDICINE

## 2020-11-13 PROCEDURE — 3078F DIAST BP <80 MM HG: CPT | Mod: HCNC,CPTII,S$GLB, | Performed by: INTERNAL MEDICINE

## 2020-11-13 PROCEDURE — 3008F BODY MASS INDEX DOCD: CPT | Mod: HCNC,CPTII,S$GLB, | Performed by: INTERNAL MEDICINE

## 2020-11-13 PROCEDURE — 1101F PT FALLS ASSESS-DOCD LE1/YR: CPT | Mod: HCNC,CPTII,S$GLB, | Performed by: INTERNAL MEDICINE

## 2020-11-13 PROCEDURE — 1101F PR PT FALLS ASSESS DOC 0-1 FALLS W/OUT INJ PAST YR: ICD-10-PCS | Mod: HCNC,CPTII,S$GLB, | Performed by: INTERNAL MEDICINE

## 2020-11-13 PROCEDURE — 3008F PR BODY MASS INDEX (BMI) DOCUMENTED: ICD-10-PCS | Mod: HCNC,CPTII,S$GLB, | Performed by: INTERNAL MEDICINE

## 2020-11-13 PROCEDURE — 1126F AMNT PAIN NOTED NONE PRSNT: CPT | Mod: HCNC,S$GLB,, | Performed by: INTERNAL MEDICINE

## 2020-11-13 PROCEDURE — 3288F PR FALLS RISK ASSESSMENT DOCUMENTED: ICD-10-PCS | Mod: HCNC,CPTII,S$GLB, | Performed by: INTERNAL MEDICINE

## 2020-11-13 PROCEDURE — 3077F PR MOST RECENT SYSTOLIC BLOOD PRESSURE >= 140 MM HG: ICD-10-PCS | Mod: HCNC,CPTII,S$GLB, | Performed by: INTERNAL MEDICINE

## 2020-11-13 PROCEDURE — 99214 PR OFFICE/OUTPT VISIT, EST, LEVL IV, 30-39 MIN: ICD-10-PCS | Mod: HCNC,S$GLB,, | Performed by: INTERNAL MEDICINE

## 2020-11-13 PROCEDURE — 99214 OFFICE O/P EST MOD 30 MIN: CPT | Mod: HCNC,S$GLB,, | Performed by: INTERNAL MEDICINE

## 2020-11-13 PROCEDURE — 3288F FALL RISK ASSESSMENT DOCD: CPT | Mod: HCNC,CPTII,S$GLB, | Performed by: INTERNAL MEDICINE

## 2020-11-13 PROCEDURE — 99999 PR PBB SHADOW E&M-EST. PATIENT-LVL IV: CPT | Mod: PBBFAC,HCNC,, | Performed by: INTERNAL MEDICINE

## 2020-11-13 PROCEDURE — 1159F MED LIST DOCD IN RCRD: CPT | Mod: HCNC,S$GLB,, | Performed by: INTERNAL MEDICINE

## 2020-11-13 PROCEDURE — 3078F PR MOST RECENT DIASTOLIC BLOOD PRESSURE < 80 MM HG: ICD-10-PCS | Mod: HCNC,CPTII,S$GLB, | Performed by: INTERNAL MEDICINE

## 2020-11-13 PROCEDURE — 3077F SYST BP >= 140 MM HG: CPT | Mod: HCNC,CPTII,S$GLB, | Performed by: INTERNAL MEDICINE

## 2020-11-13 PROCEDURE — 1126F PR PAIN SEVERITY QUANTIFIED, NO PAIN PRESENT: ICD-10-PCS | Mod: HCNC,S$GLB,, | Performed by: INTERNAL MEDICINE

## 2020-11-13 PROCEDURE — 1159F PR MEDICATION LIST DOCUMENTED IN MEDICAL RECORD: ICD-10-PCS | Mod: HCNC,S$GLB,, | Performed by: INTERNAL MEDICINE

## 2020-11-13 NOTE — PROGRESS NOTES
Subjective:      DATE OF VISIT: 11/13/20     ?  Patient ID:?Kodi Ribeiro is a 70 y.o. male.?? MR#: [id number]   ?   PRIMARY ONCOLOGIST: Dr. Rodriguez    ? Primary Care Providers:  Norma Nuñez MD, MD (General)     CHIEF COMPLAINT: ???  Follow-up  ?   ONCOLOGIC DIAGNOSIS:  Colon adenocarcinoma, stage TBD, pT3 pN0 Mx  ?   CURRENT TREATMENT: TBD    PAST TREATMENT:  6//1/20 resection transverse colon  ?   ONCOLOGIC HISTORY:   ?    Mr. Ribeiro has been seen in our clinic for anemia found of iron deficiency status post IV iron therapy and recommended GI evaluation due to concern for bleed.    06/02/2020 colonoscopy and upper endoscopy revealed several sessile polyp polyps as well as fungating infiltrating ulcerated partially obstructing large mass in descending colon.  pathology:  Descending colon lesion with moderately differentiated adenocarcinoma.  Other polyps in cecum, descending colon tubular adenomas.    06/09/2020  Originally seen in our clinic for iron deficiency anemia refer to GI due to concern for GI bleed.  6/2/20 CEA 2.7    6/5/20 CT c/a/p notable for sub cm pulmonary nodules including 7 mm and 8 mm nodules in left upper lobe, 6 mm nodule superior middle lobe, concerning for metastatic disease.  06/11/2020 transverse colon resected, Dr. Yang.    Pathology:  Invasive moderately differentiated colon adenocarcinoma, 4.3 cm, no perforation, grade 2, invades through muscularis propria into pericolonic tissue, margins uninvolved closest 2.0 cm, no lymphovascular or perineural invasion.  0/17 lymph nodes involved.  pT3 N0.  mismatch repair proteins all intact.    HPI    He returns for follow-up.  He had recent hospitalization for CHF exacerbation status post diuresis with improvement in symptoms back at prior baseline.  He denies chest pain, shortness of breath, or notable edema.  He notes energy is fair, stable.  Appetite is good.  No evidence of bleeding including melena hematochezia.  No unintentional weight  loss.    Review of Systems    ?   A comprehensive 14-point review of systems was reviewed with patient and was negative other than as specified above.   ?   Objective:      Physical Exam      ?   Vitals:    11/13/20 1014   BP: (!) 157/69   Pulse: 65   Resp: 16   Temp: 98.1 °F (36.7 °C)      ?   ECOG:?1   General appearance: Generally well appearing, in no acute distress.   Head, eyes, ears, nose, and throat: moist mucous membranes.   Respiratory:  Normal work of breathing  Abdomen: nontender, nondistended.   Extremities: Warm, without edema.   Neurologic: Alert and oriented. Grossly normal strength, coordination, and gait.  Prosthetic leg  Skin: No rashes, ecchymoses or petechial lesion.   Psychiatric:  Normal mood and affect.    ?   Laboratory:  ?   Lab Results   Component Value Date    WBC 6.02 11/06/2020    HGB 10.1 (L) 11/06/2020    HCT 32.8 (L) 11/06/2020    MCV 92 11/06/2020     11/06/2020         Chemistry        Component Value Date/Time     11/06/2020 2007    K 5.1 11/06/2020 2007     11/06/2020 2007    CO2 18 (L) 11/06/2020 2007    BUN 26 (H) 11/06/2020 2007    CREATININE 2.2 (H) 11/06/2020 2007    GLU 87 11/06/2020 2007        Component Value Date/Time    CALCIUM 8.5 (L) 11/06/2020 2007    ALKPHOS 208 (H) 11/06/2020 2007    AST 11 11/06/2020 2007    ALT 7 (L) 11/06/2020 2007    BILITOT 0.5 11/06/2020 2007    ESTGFRAFRICA 33.6 (A) 11/06/2020 2007    EGFRNONAA 29.1 (A) 11/06/2020 2007        Lab Results   Component Value Date    IRON 51 11/06/2020    TIBC 234 (L) 11/06/2020    FERRITIN 335 (H) 11/06/2020       Tumor markers   ? CEA negative 6/2/20  ?   Imaging:  ?  No results found for this or any previous visit (from the past 2160 hour(s)).  Results for orders placed or performed during the hospital encounter of 11/16/20 (from the past 2160 hour(s))   CT Chest Abdomen Pelvis Without Contrast (XPD)    Impression    Stable minimally increased size noncalcified pulmonary nodules  bilaterally.  See above.    Otherwise, stable appearance of the chest.  No new or developing bulky or matted adenopathy.    Postsurgical changes subtotal colectomy with ostomy site right lower quadrant.    Otherwise, stable appearance of the abdomen and pelvis.  See detailed discussion of chronic findings as above.      Electronically signed by: Juan R Chacon MD  Date:    11/18/2020  Time:    09:08       Pathology:    I have reviewed the patient's medical history in detail and updated the computerized patient record.       ?   Assessment/Plan:       1. Colon adenocarcinoma    2. Pulmonary nodules/lesions, multiple          Plan:     # colon adenocarcinoma, pT3 pN0 MX, MSI intact:  I have reviewed imaging including CT chest abdomen pelvis June 2020 with new pulmonary nodules 6-8 mm concerning for metastatic disease, new from prior chest imaging 2/2020.  After visit arrange for CT scan which on review notable for relatively stable minimally increased size noncalcified pulmonary nodules bilaterally.  No notable new lesions/adenopathy and postsurgical changes with colectomy/ostomy.  Recommend continued surveillance and patient preference, history and physical exam quarterly with lab work including CEA and repeat imaging can plan in 6 months.  He should have follow-up with colorectal surgery as well I repeat colonoscopy 06/20/2021.  Continue follow-up with surgery-requested STRATA     # anemia:  Status post IV iron therapy last 06/03/2020.  repeat iron indices continue to be on low end with persistent anemia and recommended repeat IV iron therapy.   Anemia likely multifactorial however given chronic illness and neoplastic disease..  May also be anemia of chronic illness with recent hospitalizations/resection and neoplastic disease.    Advance Care Planning   seen inpatient by palliative care, RV pending workup/treatment discussion ongoing.         Follow-Up:   Patient Instructions   Ct c/a/p, will call with results  RV  in 3 months with labs couple days prior
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
## 2020-11-13 NOTE — Clinical Note
Please call patient to let him know results of CT showed  no notable changes in lung nodules.  We will continue to follow this on future scans.  Rest of the body without concerning findings. Colon shows changes from his surgery.  Please have him follow-up for surveillance in 3 months with labs couple days prior.  No scans at that visit please.

## 2020-11-16 ENCOUNTER — HOSPITAL ENCOUNTER (OUTPATIENT)
Dept: RADIOLOGY | Facility: HOSPITAL | Age: 70
Discharge: HOME | End: 2020-11-16
Attending: INTERNAL MEDICINE
Payer: MEDICARE

## 2020-11-16 ENCOUNTER — TELEPHONE (OUTPATIENT)
Dept: HEMATOLOGY/ONCOLOGY | Facility: CLINIC | Age: 70
End: 2020-11-16

## 2020-11-16 DIAGNOSIS — R91.8 PULMONARY NODULES/LESIONS, MULTIPLE: ICD-10-CM

## 2020-11-16 DIAGNOSIS — C18.9 COLON ADENOCARCINOMA: ICD-10-CM

## 2020-11-16 PROCEDURE — 74176 CT ABD & PELVIS W/O CONTRAST: CPT | Mod: 26,HCNC,, | Performed by: RADIOLOGY

## 2020-11-16 PROCEDURE — 71250 CT THORAX DX C-: CPT | Mod: TC,HCNC

## 2020-11-16 PROCEDURE — 74176 CT CHEST ABDOMEN PELVIS WITHOUT CONTRAST(XPD): ICD-10-PCS | Mod: 26,HCNC,, | Performed by: RADIOLOGY

## 2020-11-16 PROCEDURE — 71250 CT CHEST ABDOMEN PELVIS WITHOUT CONTRAST(XPD): ICD-10-PCS | Mod: 26,HCNC,, | Performed by: RADIOLOGY

## 2020-11-16 PROCEDURE — 25500020 PHARM REV CODE 255: Mod: HCNC | Performed by: INTERNAL MEDICINE

## 2020-11-16 PROCEDURE — 74176 CT ABD & PELVIS W/O CONTRAST: CPT | Mod: TC,HCNC

## 2020-11-16 PROCEDURE — 71250 CT THORAX DX C-: CPT | Mod: 26,HCNC,, | Performed by: RADIOLOGY

## 2020-11-16 RX ADMIN — IOHEXOL 30 ML: 350 INJECTION, SOLUTION INTRAVENOUS at 12:11

## 2020-11-16 NOTE — TELEPHONE ENCOUNTER
Nurse called pt to ensure he is aware his nephrologist has agreed for him to have oral contrast for his pet scan. His wife received the message as per dr valdes and dr roberts.

## 2020-11-19 ENCOUNTER — CARE AT HOME (OUTPATIENT)
Dept: HOME HEALTH SERVICES | Facility: CLINIC | Age: 70
End: 2020-11-19
Payer: MEDICARE

## 2020-11-19 DIAGNOSIS — Z09 FOLLOW UP: Primary | ICD-10-CM

## 2020-11-19 PROCEDURE — 99443 PR PHYSICIAN TELEPHONE EVALUATION 21-30 MIN: ICD-10-PCS | Mod: 95,,, | Performed by: NURSE PRACTITIONER

## 2020-11-19 PROCEDURE — 99443 PR PHYSICIAN TELEPHONE EVALUATION 21-30 MIN: CPT | Mod: 95,,, | Performed by: NURSE PRACTITIONER

## 2020-11-19 NOTE — PROGRESS NOTES
Established Patient - Audio Only Telehealth Visit with [facility] Provider     The patient location is: HOME  The chief complaint leading to consultation is: routine care for evaluation and management of chronic medial issues and medication review.     Visit type: Virtual visit with audio only (telephone)     The reason for the audio only service rather than synchronous audio and video virtual visit was related to technical difficulties or patient preference/necessity.     Each patient to whom I provide medical services by telemedicine is:  (1) informed of the relationship between the physician and patient and the respective role of any other health care provider with respect to management of the patient; and (2) notified that they may decline to receive medical services by telemedicine and may withdraw from such care at any time. Patient verbally consented to receive this service via voice-only telephone call.     Subjective:   Today:  Mr. Kodi Franco is a 70 y.o. male being evaluated by telephone today for routine evaluation and management of chronic medical issues and medication review. Patient was recently seen in the ED for complaints  of elevated bood pressure and released. Since discharge, he has stabilized without further concerns. Today the patient denies any chest pain, SOB, nausea, vomiting, diarrhea, constipation, fever, chills, cough, known COVID exposure or illness. Reports taking all medications as prescribed. [facility] will follow up in 4-6 weeks as scheduled. In the interim, he is encouraged to call should he have any concerns or complaints. No other acute needs identified at this time.   Objective:     Lab Results   Component Value Date    WBC 6.02 11/06/2020    HGB 10.1 (L) 11/06/2020    HCT 32.8 (L) 11/06/2020     11/06/2020    CHOL 117 (L) 04/15/2020    TRIG 57 04/15/2020    HDL 55 04/15/2020    ALT 7 (L) 11/06/2020    AST 11 11/06/2020     11/06/2020     K 5.1 11/06/2020     11/06/2020    CREATININE 2.2 (H) 11/06/2020    BUN 26 (H) 11/06/2020    CO2 18 (L) 11/06/2020    TSH 1.747 03/27/2019    PSA 0.72 05/17/2018    INR 1.0 06/14/2020    HGBA1C 4.8 04/15/2020     Assessment:   Problem List Items Addressed on this Visit:  No diagnosis found.  Plan:   No diagnosis found.    Encounter for Medical Follow-Up and Medication Review  - [facility] to schedule follow-up visit with patient in 4-6 weeks or PRN.    Patient Instructions Given:  - Continue all medications, treatments and therapies as ordered.   - Follow all instructions, recommendations as discussed.  - Maintain Safety Precautions at all times.  - Attend all medical appointments as scheduled.  - For worsening symptoms: call Primary Care Physician or Nurse Practitioner.  - For emergencies, call 911 or immediately report to the nearest emergency room.    Future Appointments   Date Time Provider Department Center   11/19/2020  1:30 PM Porter Salinas NP [id number]   1/4/2021 10:20 AM Leonardo Yang MD [id number]   1/21/2021  9:50 AM LABORATORY, [facility] LAB [facility]   1/21/2021 10:00 AM SPECIMEN, [facility] SPECLAB [facility]   1/28/2021 11:00 AM Brenton Jacobson MD ON NEPHRO  Medical C   4/23/2021  2:40 PM Adriel Boone MD ON CARDIO  Medical C     Risks of environmental exposure to coronavirus discussed including: social distancing, hand hygiene, and limiting departures from the home for necessities only. Reports understanding and willingness to comply.     Signature:    Porter Salinas, MSN, APRN, FNP-C  [facility]     Total time for this telephone encounter was 22 minutes. The following issues were discussed: primary and secondary diagnoses, co-morbidities, prescribed medications, treatment modalities, importance of compliance with medical advice and directives for follow-up care.    This service was not originating from a related  E/M service provided within the previous 7 days nor will  to an E/M service or procedure within the next 24 hours or my soonest available appointment.  Prevailing standard of care was able to be met in this audio-only visit.

## 2020-11-19 NOTE — PATIENT INSTRUCTIONS
- [facility] to schedule follow-up visit with patient in 4-6 weeks or as needed.  - Continue all medications, treatments and therapies as ordered.   - Follow all instructions, recommendations as discussed.  - Maintain Safety Precautions at all times.  - Attend all medical appointments as scheduled.  - For worsening symptoms: call Primary Care Physician or Nurse Practitioner.  - For emergencies, call 911 or immediately report to the nearest emergency room.  - Limit Risks of environmental exposure to coronavirus as discussed including: social distancing, hand hygiene, and limiting departures from the home for necessities only.     Your care is important to us. If your provider recommended a follow-up appointment or test, we are happy to help you coordinate your recommended care. It is important that you complete your recommended follow-up. If you need help scheduling, please call [phone]. Appointments can also be made online through the patient portal.  While scheduling and attending your appointments is your responsibility, our goal is to support and empower you throughout that process.    [facility] Care Line - 24/7 Assistance  Unless otherwise directed by your provider, please contact [facility], our nurse care line that is available for 24/7 assistance.  Registered nurses in the [facility] provide: appointment scheduling, clinical advisement, health education, and other advisory services.  Call: [phone] (toll free)    COVID-19 Prevention   Avoid close contact with people and stay home if youre sick, except to get medical care.   Cover coughs and sneezes with a tissue, or use the inside of your elbow. Immediately wash your hands or use hand .  For more information, see CDC link below:  https://www.cdc.gov/coronavirus/2019-ncov/hcp/guidance-prevent-spread.html#precautions     The following information is provided to all patients.  This information is  to help you find resources for any of the problems found today that may be affecting your health:            Living healthy guide: www.[facility].[place].[facility]    Understanding Diabetes: www.diabetes.org   Eating healthy: www.cdc.gov/healthyweight   CDC home safety checklist: www.cdc.gov/steadi/patient.html  Agency on Aging: www.goea.[place].[facility]    Alcoholics anonymous (AA): www.aa.org   Physical Activity: www.leon.nih.gov/fl3ocyj    Tobacco use: www.quitwithusla.org

## 2020-11-30 NOTE — PROGRESS NOTES
Nurse notified patient of results as per provider, pt verbalized full understanding, had no other questions or concerns at time call ended.

## 2020-12-14 RX ORDER — TRIAMCINOLONE ACETONIDE 1 MG/G
CREAM TOPICAL 2 TIMES DAILY
Qty: 1 TUBE | Refills: 3 | Status: SHIPPED | OUTPATIENT
Start: 2020-12-14 | End: 2022-01-01

## 2020-12-22 ENCOUNTER — PES CALL (OUTPATIENT)
Dept: ADMINISTRATIVE | Facility: CLINIC | Age: 70
End: 2020-12-22

## 2020-12-28 ENCOUNTER — TELEPHONE (OUTPATIENT)
Dept: SURGERY | Facility: CLINIC | Age: 70
End: 2020-12-28

## 2020-12-28 NOTE — TELEPHONE ENCOUNTER
Spoke to and rescheduled pt's appt with Dr Yang for Monday 2/8 [facility] at 1620 per pt's request -     ----- Message from Spencer Grant sent at 12/28/2020  3:41 PM CST -----  Contact: self  Type:  Sooner Apoointment Request    Caller is requesting a sooner appointment.  Caller declined first available appointment listed below.  Caller will not accept being placed on the waitlist and is requesting a message be sent to doctor.  Name of Caller:Kodi Franco   When is the first available appointment?2020  Symptoms:b reschedule  Would the patient rather a call back or a response via [facility]? Call back  Best Call Back Number:[phone]  Additional Information:

## 2021-01-01 ENCOUNTER — TELEPHONE (OUTPATIENT)
Dept: FAMILY MEDICINE | Facility: CLINIC | Age: 71
End: 2021-01-01

## 2021-01-01 ENCOUNTER — OFFICE VISIT (OUTPATIENT)
Dept: CARDIOLOGY | Facility: CLINIC | Age: 71
End: 2021-01-01
Payer: MEDICARE

## 2021-01-01 ENCOUNTER — TELEPHONE (OUTPATIENT)
Dept: PREADMISSION TESTING | Facility: HOSPITAL | Age: 71
End: 2021-01-01
Payer: MEDICARE

## 2021-01-01 ENCOUNTER — TELEPHONE (OUTPATIENT)
Dept: UROLOGY | Facility: CLINIC | Age: 71
End: 2021-01-01

## 2021-01-01 ENCOUNTER — OFFICE VISIT (OUTPATIENT)
Dept: PODIATRY | Facility: CLINIC | Age: 71
End: 2021-01-01
Payer: MEDICARE

## 2021-01-01 ENCOUNTER — TELEPHONE (OUTPATIENT)
Dept: ADMINISTRATIVE | Facility: HOSPITAL | Age: 71
End: 2021-01-01

## 2021-01-01 ENCOUNTER — OFFICE VISIT (OUTPATIENT)
Dept: SURGERY | Facility: CLINIC | Age: 71
End: 2021-01-01
Payer: MEDICARE

## 2021-01-01 ENCOUNTER — OFFICE VISIT (OUTPATIENT)
Dept: HEMATOLOGY/ONCOLOGY | Facility: CLINIC | Age: 71
End: 2021-01-01
Payer: MEDICARE

## 2021-01-01 ENCOUNTER — PROCEDURE VISIT (OUTPATIENT)
Dept: UROLOGY | Facility: CLINIC | Age: 71
End: 2021-01-01
Payer: MEDICARE

## 2021-01-01 ENCOUNTER — HOSPITAL ENCOUNTER (OUTPATIENT)
Dept: RADIOLOGY | Facility: HOSPITAL | Age: 71
Discharge: HOME | End: 2021-11-22
Attending: INTERNAL MEDICINE
Payer: MEDICARE

## 2021-01-01 ENCOUNTER — LAB VISIT (OUTPATIENT)
Dept: LAB | Facility: HOSPITAL | Age: 71
End: 2021-01-01
Attending: INTERNAL MEDICINE
Payer: MEDICARE

## 2021-01-01 ENCOUNTER — TELEPHONE (OUTPATIENT)
Dept: CARDIOLOGY | Facility: CLINIC | Age: 71
End: 2021-01-01

## 2021-01-01 ENCOUNTER — HOSPITAL ENCOUNTER (EMERGENCY)
Facility: HOSPITAL | Age: 71
Discharge: HOME | End: 2021-03-11
Attending: EMERGENCY MEDICINE
Payer: MEDICARE

## 2021-01-01 ENCOUNTER — DOCUMENTATION ONLY (OUTPATIENT)
Dept: INFECTIOUS DISEASES | Facility: HOSPITAL | Age: 71
End: 2021-01-01

## 2021-01-01 ENCOUNTER — OFFICE VISIT (OUTPATIENT)
Dept: FAMILY MEDICINE | Facility: CLINIC | Age: 71
End: 2021-01-01
Payer: MEDICARE

## 2021-01-01 ENCOUNTER — TELEPHONE (OUTPATIENT)
Dept: ENDOSCOPY | Facility: HOSPITAL | Age: 71
End: 2021-01-01
Payer: MEDICARE

## 2021-01-01 ENCOUNTER — TELEPHONE (OUTPATIENT)
Dept: HEMATOLOGY/ONCOLOGY | Facility: CLINIC | Age: 71
End: 2021-01-01

## 2021-01-01 ENCOUNTER — LAB VISIT (OUTPATIENT)
Dept: LAB | Facility: HOSPITAL | Age: 71
End: 2021-01-01
Attending: COLON & RECTAL SURGERY
Payer: MEDICARE

## 2021-01-01 ENCOUNTER — TELEPHONE (OUTPATIENT)
Dept: PREADMISSION TESTING | Facility: HOSPITAL | Age: 71
End: 2021-01-01

## 2021-01-01 ENCOUNTER — HOSPITAL ENCOUNTER (OUTPATIENT)
Dept: CARDIOLOGY | Facility: HOSPITAL | Age: 71
Discharge: HOME | End: 2021-02-24
Attending: INTERNAL MEDICINE
Payer: MEDICARE

## 2021-01-01 ENCOUNTER — CLINICAL SUPPORT (OUTPATIENT)
Dept: FAMILY MEDICINE | Facility: CLINIC | Age: 71
End: 2021-01-01
Payer: MEDICARE

## 2021-01-01 ENCOUNTER — PATIENT OUTREACH (OUTPATIENT)
Dept: ADMINISTRATIVE | Facility: OTHER | Age: 71
End: 2021-01-01

## 2021-01-01 ENCOUNTER — HOSPITAL ENCOUNTER (OUTPATIENT)
Dept: RADIOLOGY | Facility: HOSPITAL | Age: 71
Discharge: HOME | End: 2021-07-01
Attending: INTERNAL MEDICINE
Payer: MEDICARE

## 2021-01-01 ENCOUNTER — TELEPHONE (OUTPATIENT)
Dept: SURGERY | Facility: CLINIC | Age: 71
End: 2021-01-01
Payer: MEDICARE

## 2021-01-01 ENCOUNTER — LAB VISIT (OUTPATIENT)
Dept: LAB | Facility: HOSPITAL | Age: 71
End: 2021-01-01
Attending: FAMILY MEDICINE
Payer: MEDICARE

## 2021-01-01 ENCOUNTER — LAB VISIT (OUTPATIENT)
Dept: LAB | Facility: HOSPITAL | Age: 71
End: 2021-01-01
Attending: PODIATRIST
Payer: MEDICARE

## 2021-01-01 ENCOUNTER — HOSPITAL ENCOUNTER (EMERGENCY)
Facility: HOSPITAL | Age: 71
Discharge: HOME | End: 2021-06-15
Attending: EMERGENCY MEDICINE
Payer: MEDICARE

## 2021-01-01 ENCOUNTER — PES CALL (OUTPATIENT)
Dept: ADMINISTRATIVE | Facility: CLINIC | Age: 71
End: 2021-01-01

## 2021-01-01 ENCOUNTER — OFFICE VISIT (OUTPATIENT)
Dept: UROLOGY | Facility: CLINIC | Age: 71
End: 2021-01-01
Payer: MEDICARE

## 2021-01-01 ENCOUNTER — HOSPITAL ENCOUNTER (OUTPATIENT)
Dept: RADIOLOGY | Facility: HOSPITAL | Age: 71
Discharge: HOME | End: 2021-09-28
Attending: UROLOGY
Payer: MEDICARE

## 2021-01-01 ENCOUNTER — TELEPHONE (OUTPATIENT)
Dept: SURGERY | Facility: CLINIC | Age: 71
End: 2021-01-01

## 2021-01-01 ENCOUNTER — INFUSION (OUTPATIENT)
Dept: INFECTIOUS DISEASES | Facility: HOSPITAL | Age: 71
End: 2021-01-01
Attending: FAMILY MEDICINE
Payer: MEDICARE

## 2021-01-01 ENCOUNTER — HOSPITAL ENCOUNTER (OUTPATIENT)
Dept: RADIOLOGY | Facility: HOSPITAL | Age: 71
Discharge: HOME | End: 2021-04-29
Attending: PODIATRIST
Payer: MEDICARE

## 2021-01-01 ENCOUNTER — HOSPITAL ENCOUNTER (OUTPATIENT)
Facility: HOSPITAL | Age: 71
Discharge: HOME | End: 2021-02-28
Attending: EMERGENCY MEDICINE | Admitting: INTERNAL MEDICINE
Payer: MEDICARE

## 2021-01-01 ENCOUNTER — HOSPITAL ENCOUNTER (OUTPATIENT)
Facility: HOSPITAL | Age: 71
Discharge: HOME | End: 2021-02-11
Attending: EMERGENCY MEDICINE | Admitting: FAMILY MEDICINE
Payer: MEDICARE

## 2021-01-01 ENCOUNTER — HOSPITAL ENCOUNTER (OUTPATIENT)
Dept: RADIOLOGY | Facility: HOSPITAL | Age: 71
Discharge: HOME | End: 2021-06-15
Attending: PODIATRIST
Payer: MEDICARE

## 2021-01-01 ENCOUNTER — IMMUNIZATION (OUTPATIENT)
Dept: FAMILY MEDICINE | Facility: CLINIC | Age: 71
End: 2021-01-01
Payer: MEDICARE

## 2021-01-01 VITALS
HEIGHT: 73 IN | DIASTOLIC BLOOD PRESSURE: 54 MMHG | TEMPERATURE: 98 F | OXYGEN SATURATION: 97 % | SYSTOLIC BLOOD PRESSURE: 100 MMHG | BODY MASS INDEX: 25.45 KG/M2 | WEIGHT: 192 LBS | HEART RATE: 81 BPM

## 2021-01-01 VITALS
DIASTOLIC BLOOD PRESSURE: 70 MMHG | HEART RATE: 81 BPM | WEIGHT: 186.31 LBS | BODY MASS INDEX: 24.69 KG/M2 | HEIGHT: 73 IN | SYSTOLIC BLOOD PRESSURE: 142 MMHG | OXYGEN SATURATION: 98 %

## 2021-01-01 VITALS
WEIGHT: 176.81 LBS | HEART RATE: 66 BPM | OXYGEN SATURATION: 99 % | RESPIRATION RATE: 15 BRPM | HEIGHT: 73 IN | BODY MASS INDEX: 23.43 KG/M2 | BODY MASS INDEX: 22.53 KG/M2 | TEMPERATURE: 98 F | WEIGHT: 170 LBS | WEIGHT: 176.81 LBS | HEIGHT: 73 IN | SYSTOLIC BLOOD PRESSURE: 161 MMHG | BODY MASS INDEX: 23.43 KG/M2 | DIASTOLIC BLOOD PRESSURE: 74 MMHG | HEIGHT: 73 IN

## 2021-01-01 VITALS — BODY MASS INDEX: 22.53 KG/M2 | HEIGHT: 73 IN | WEIGHT: 170 LBS

## 2021-01-01 VITALS
WEIGHT: 161.19 LBS | OXYGEN SATURATION: 95 % | HEIGHT: 72 IN | SYSTOLIC BLOOD PRESSURE: 160 MMHG | TEMPERATURE: 98 F | SYSTOLIC BLOOD PRESSURE: 118 MMHG | HEART RATE: 75 BPM | OXYGEN SATURATION: 95 % | DIASTOLIC BLOOD PRESSURE: 56 MMHG | DIASTOLIC BLOOD PRESSURE: 72 MMHG | BODY MASS INDEX: 21.36 KG/M2 | HEART RATE: 72 BPM | HEIGHT: 73 IN | BODY MASS INDEX: 23.26 KG/M2 | RESPIRATION RATE: 20 BRPM | WEIGHT: 171.75 LBS

## 2021-01-01 VITALS
OXYGEN SATURATION: 96 % | DIASTOLIC BLOOD PRESSURE: 64 MMHG | HEIGHT: 72 IN | SYSTOLIC BLOOD PRESSURE: 138 MMHG | TEMPERATURE: 98 F | BODY MASS INDEX: 23.32 KG/M2 | WEIGHT: 172.19 LBS | HEART RATE: 82 BPM

## 2021-01-01 VITALS
DIASTOLIC BLOOD PRESSURE: 64 MMHG | BODY MASS INDEX: 22.53 KG/M2 | OXYGEN SATURATION: 98 % | HEART RATE: 85 BPM | SYSTOLIC BLOOD PRESSURE: 130 MMHG | WEIGHT: 170 LBS | HEIGHT: 73 IN

## 2021-01-01 VITALS
WEIGHT: 179.88 LBS | DIASTOLIC BLOOD PRESSURE: 66 MMHG | BODY MASS INDEX: 23.73 KG/M2 | TEMPERATURE: 99 F | HEART RATE: 72 BPM | SYSTOLIC BLOOD PRESSURE: 120 MMHG

## 2021-01-01 VITALS
OXYGEN SATURATION: 95 % | SYSTOLIC BLOOD PRESSURE: 150 MMHG | RESPIRATION RATE: 20 BRPM | HEIGHT: 72 IN | BODY MASS INDEX: 23.59 KG/M2 | TEMPERATURE: 98 F | HEART RATE: 72 BPM | WEIGHT: 174.19 LBS | DIASTOLIC BLOOD PRESSURE: 70 MMHG

## 2021-01-01 VITALS
HEIGHT: 72 IN | DIASTOLIC BLOOD PRESSURE: 72 MMHG | RESPIRATION RATE: 16 BRPM | WEIGHT: 168.88 LBS | BODY MASS INDEX: 22.87 KG/M2 | SYSTOLIC BLOOD PRESSURE: 154 MMHG | HEART RATE: 70 BPM | TEMPERATURE: 98 F | OXYGEN SATURATION: 95 %

## 2021-01-01 VITALS
SYSTOLIC BLOOD PRESSURE: 112 MMHG | HEART RATE: 83 BPM | HEIGHT: 73 IN | DIASTOLIC BLOOD PRESSURE: 60 MMHG | WEIGHT: 181.44 LBS | BODY MASS INDEX: 24.05 KG/M2 | TEMPERATURE: 99 F | OXYGEN SATURATION: 97 %

## 2021-01-01 VITALS
WEIGHT: 187.63 LBS | HEART RATE: 78 BPM | SYSTOLIC BLOOD PRESSURE: 128 MMHG | HEIGHT: 73 IN | BODY MASS INDEX: 24.87 KG/M2 | DIASTOLIC BLOOD PRESSURE: 64 MMHG

## 2021-01-01 VITALS
HEIGHT: 73 IN | HEART RATE: 83 BPM | DIASTOLIC BLOOD PRESSURE: 68 MMHG | BODY MASS INDEX: 24.87 KG/M2 | SYSTOLIC BLOOD PRESSURE: 154 MMHG | WEIGHT: 187.63 LBS

## 2021-01-01 VITALS
WEIGHT: 166 LBS | DIASTOLIC BLOOD PRESSURE: 80 MMHG | OXYGEN SATURATION: 99 % | HEIGHT: 73 IN | HEART RATE: 82 BPM | BODY MASS INDEX: 22 KG/M2 | SYSTOLIC BLOOD PRESSURE: 146 MMHG

## 2021-01-01 VITALS
BODY MASS INDEX: 23.58 KG/M2 | HEART RATE: 85 BPM | SYSTOLIC BLOOD PRESSURE: 111 MMHG | OXYGEN SATURATION: 98 % | HEIGHT: 73 IN | DIASTOLIC BLOOD PRESSURE: 63 MMHG | TEMPERATURE: 98 F | WEIGHT: 177.94 LBS

## 2021-01-01 VITALS
OXYGEN SATURATION: 93 % | SYSTOLIC BLOOD PRESSURE: 176 MMHG | HEART RATE: 81 BPM | RESPIRATION RATE: 20 BRPM | TEMPERATURE: 97 F | DIASTOLIC BLOOD PRESSURE: 79 MMHG

## 2021-01-01 VITALS
DIASTOLIC BLOOD PRESSURE: 61 MMHG | HEART RATE: 83 BPM | BODY MASS INDEX: 25.16 KG/M2 | WEIGHT: 190.69 LBS | TEMPERATURE: 98 F | SYSTOLIC BLOOD PRESSURE: 114 MMHG

## 2021-01-01 VITALS
SYSTOLIC BLOOD PRESSURE: 160 MMHG | WEIGHT: 177.69 LBS | DIASTOLIC BLOOD PRESSURE: 70 MMHG | OXYGEN SATURATION: 96 % | HEIGHT: 72 IN | BODY MASS INDEX: 24.07 KG/M2 | HEART RATE: 85 BPM

## 2021-01-01 VITALS — HEIGHT: 73 IN | WEIGHT: 170 LBS | BODY MASS INDEX: 22.53 KG/M2

## 2021-01-01 DIAGNOSIS — I50.9 CHF (CONGESTIVE HEART FAILURE): ICD-10-CM

## 2021-01-01 DIAGNOSIS — I65.22 LEFT CAROTID ARTERY STENOSIS: ICD-10-CM

## 2021-01-01 DIAGNOSIS — Z89.511 STATUS POST BELOW KNEE AMPUTATION OF RIGHT LOWER EXTREMITY: ICD-10-CM

## 2021-01-01 DIAGNOSIS — R10.13 EPIGASTRIC PAIN: Primary | ICD-10-CM

## 2021-01-01 DIAGNOSIS — I35.0 NONRHEUMATIC AORTIC VALVE STENOSIS: Chronic | ICD-10-CM

## 2021-01-01 DIAGNOSIS — I73.9 PVD (PERIPHERAL VASCULAR DISEASE): Chronic | ICD-10-CM

## 2021-01-01 DIAGNOSIS — M24.572 CONTRACTURE, LEFT ANKLE: ICD-10-CM

## 2021-01-01 DIAGNOSIS — I65.23 ASYMPTOMATIC BILATERAL CAROTID ARTERY STENOSIS: ICD-10-CM

## 2021-01-01 DIAGNOSIS — I50.32 CHRONIC DIASTOLIC HEART FAILURE: Primary | ICD-10-CM

## 2021-01-01 DIAGNOSIS — C18.9 COLON ADENOCARCINOMA: ICD-10-CM

## 2021-01-01 DIAGNOSIS — Z89.432 HISTORY OF TRANSMETATARSAL AMPUTATION OF LEFT FOOT: ICD-10-CM

## 2021-01-01 DIAGNOSIS — I36.1 NONRHEUMATIC TRICUSPID VALVE REGURGITATION: ICD-10-CM

## 2021-01-01 DIAGNOSIS — E78.2 MIXED HYPERLIPIDEMIA: Chronic | ICD-10-CM

## 2021-01-01 DIAGNOSIS — G60.9 IDIOPATHIC PERIPHERAL NEUROPATHY: ICD-10-CM

## 2021-01-01 DIAGNOSIS — L97.522 NEUROPATHIC ULCER OF FOOT, LEFT, WITH FAT LAYER EXPOSED: ICD-10-CM

## 2021-01-01 DIAGNOSIS — L97.522 ULCER OF LEFT FOOT WITH FAT LAYER EXPOSED: ICD-10-CM

## 2021-01-01 DIAGNOSIS — I25.2 OLD MI (MYOCARDIAL INFARCTION): Chronic | ICD-10-CM

## 2021-01-01 DIAGNOSIS — R94.31 ABNORMAL ECG: ICD-10-CM

## 2021-01-01 DIAGNOSIS — N18.4 CKD (CHRONIC KIDNEY DISEASE) STAGE 4, GFR 15-29 ML/MIN: ICD-10-CM

## 2021-01-01 DIAGNOSIS — I50.32 CHRONIC DIASTOLIC HEART FAILURE: ICD-10-CM

## 2021-01-01 DIAGNOSIS — I20.9 AP (ANGINA PECTORIS): ICD-10-CM

## 2021-01-01 DIAGNOSIS — N13.5 URETERAL STRICTURE, LEFT: Primary | ICD-10-CM

## 2021-01-01 DIAGNOSIS — L97.522 NEUROPATHIC ULCER OF FOOT, LEFT, WITH FAT LAYER EXPOSED: Primary | ICD-10-CM

## 2021-01-01 DIAGNOSIS — I10 ESSENTIAL HYPERTENSION: Chronic | ICD-10-CM

## 2021-01-01 DIAGNOSIS — Z89.511 STATUS POST BELOW KNEE AMPUTATION OF RIGHT LOWER EXTREMITY: Chronic | ICD-10-CM

## 2021-01-01 DIAGNOSIS — E78.5 HYPERLIPIDEMIA, UNSPECIFIED HYPERLIPIDEMIA TYPE: ICD-10-CM

## 2021-01-01 DIAGNOSIS — K59.00 CONSTIPATION, UNSPECIFIED CONSTIPATION TYPE: ICD-10-CM

## 2021-01-01 DIAGNOSIS — Z46.6 ENCOUNTER FOR REMOVAL OF URETERAL STENT: Primary | ICD-10-CM

## 2021-01-01 DIAGNOSIS — Z86.73 HISTORY OF CVA (CEREBROVASCULAR ACCIDENT): ICD-10-CM

## 2021-01-01 DIAGNOSIS — I25.10 CAD, MULTIPLE VESSEL: ICD-10-CM

## 2021-01-01 DIAGNOSIS — Z85.038 PERSONAL HISTORY OF COLON CANCER: ICD-10-CM

## 2021-01-01 DIAGNOSIS — C18.9 COLON ADENOCARCINOMA: Primary | ICD-10-CM

## 2021-01-01 DIAGNOSIS — Z46.6 ENCOUNTER FOR REMOVAL OF URETERAL STENT: ICD-10-CM

## 2021-01-01 DIAGNOSIS — I25.9 CHRONIC ISCHEMIC HEART DISEASE: ICD-10-CM

## 2021-01-01 DIAGNOSIS — M86.172 ACUTE OSTEOMYELITIS OF LEFT FOOT: ICD-10-CM

## 2021-01-01 DIAGNOSIS — J44.9 CHRONIC OBSTRUCTIVE PULMONARY DISEASE, UNSPECIFIED COPD TYPE: ICD-10-CM

## 2021-01-01 DIAGNOSIS — R10.13 EPIGASTRIC PAIN: ICD-10-CM

## 2021-01-01 DIAGNOSIS — D64.9 ANEMIA, UNSPECIFIED TYPE: ICD-10-CM

## 2021-01-01 DIAGNOSIS — I73.9 PERIPHERAL ARTERIAL DISEASE: ICD-10-CM

## 2021-01-01 DIAGNOSIS — R94.39 ABNORMAL STRESS TEST: ICD-10-CM

## 2021-01-01 DIAGNOSIS — R55 NEAR SYNCOPE: ICD-10-CM

## 2021-01-01 DIAGNOSIS — N18.31 CKD STAGE G3A/A2, GFR 45-59 AND ALBUMIN CREATININE RATIO 30-299 MG/G: ICD-10-CM

## 2021-01-01 DIAGNOSIS — I25.118 CORONARY ARTERY DISEASE OF NATIVE HEART WITH STABLE ANGINA PECTORIS, UNSPECIFIED VESSEL OR LESION TYPE: ICD-10-CM

## 2021-01-01 DIAGNOSIS — E78.5 HYPERLIPIDEMIA, UNSPECIFIED HYPERLIPIDEMIA TYPE: Primary | ICD-10-CM

## 2021-01-01 DIAGNOSIS — R91.1 SOLITARY PULMONARY NODULE: Primary | ICD-10-CM

## 2021-01-01 DIAGNOSIS — E86.0 DEHYDRATION: Primary | ICD-10-CM

## 2021-01-01 DIAGNOSIS — L97.522 ULCER OF LEFT FOOT WITH FAT LAYER EXPOSED: Primary | ICD-10-CM

## 2021-01-01 DIAGNOSIS — J96.11 CHRONIC RESPIRATORY FAILURE WITH HYPOXIA: ICD-10-CM

## 2021-01-01 DIAGNOSIS — D50.0 IRON DEFICIENCY ANEMIA DUE TO CHRONIC BLOOD LOSS: ICD-10-CM

## 2021-01-01 DIAGNOSIS — U07.1 LAB TEST POSITIVE FOR DETECTION OF COVID-19 VIRUS: Primary | ICD-10-CM

## 2021-01-01 DIAGNOSIS — R07.9 CHEST PAIN, UNSPECIFIED TYPE: Primary | ICD-10-CM

## 2021-01-01 DIAGNOSIS — M86.672 CHRONIC OSTEOMYELITIS OF LEFT FOOT: ICD-10-CM

## 2021-01-01 DIAGNOSIS — I20.9 AP (ANGINA PECTORIS): Primary | ICD-10-CM

## 2021-01-01 DIAGNOSIS — R91.8 PULMONARY NODULES/LESIONS, MULTIPLE: ICD-10-CM

## 2021-01-01 DIAGNOSIS — K21.9 GASTROESOPHAGEAL REFLUX DISEASE WITHOUT ESOPHAGITIS: Primary | ICD-10-CM

## 2021-01-01 DIAGNOSIS — R07.9 CHEST PAIN: ICD-10-CM

## 2021-01-01 DIAGNOSIS — N13.5 URETERAL STRICTURE, LEFT: ICD-10-CM

## 2021-01-01 DIAGNOSIS — I20.0 UNSTABLE ANGINA PECTORIS: ICD-10-CM

## 2021-01-01 DIAGNOSIS — I25.118 CORONARY ARTERY DISEASE OF NATIVE ARTERY OF NATIVE HEART WITH STABLE ANGINA PECTORIS: ICD-10-CM

## 2021-01-01 DIAGNOSIS — I25.10 CAD, MULTIPLE VESSEL: Primary | ICD-10-CM

## 2021-01-01 DIAGNOSIS — R91.1 SOLITARY PULMONARY NODULE: ICD-10-CM

## 2021-01-01 DIAGNOSIS — U07.1 COVID-19: Primary | ICD-10-CM

## 2021-01-01 DIAGNOSIS — C18.9 ADENOCARCINOMA OF COLON: ICD-10-CM

## 2021-01-01 DIAGNOSIS — Z87.2 HEALED ULCER OF LEFT FOOT ON EXAMINATION: Primary | ICD-10-CM

## 2021-01-01 DIAGNOSIS — N18.4 CKD STAGE G4/A3, GFR 15-29 AND ALBUMIN CREATININE RATIO >300 MG/G: ICD-10-CM

## 2021-01-01 DIAGNOSIS — U07.1 LAB TEST POSITIVE FOR DETECTION OF COVID-19 VIRUS: ICD-10-CM

## 2021-01-01 DIAGNOSIS — Z93.3 COLOSTOMY PRESENT: ICD-10-CM

## 2021-01-01 DIAGNOSIS — R10.10 UPPER ABDOMINAL PAIN: ICD-10-CM

## 2021-01-01 DIAGNOSIS — R07.9 CHEST PAIN: Primary | ICD-10-CM

## 2021-01-01 DIAGNOSIS — R05.9 COUGH: Primary | ICD-10-CM

## 2021-01-01 LAB
ALBUMIN SERPL BCP-MCNC: 2.8 G/DL (ref 3.5–5.2)
ALBUMIN SERPL BCP-MCNC: 2.9 G/DL (ref 3.5–5.2)
ALBUMIN SERPL BCP-MCNC: 2.9 G/DL (ref 3.5–5.2)
ALBUMIN SERPL BCP-MCNC: 3 G/DL (ref 3.5–5.2)
ALBUMIN SERPL BCP-MCNC: 3 G/DL (ref 3.5–5.2)
ALBUMIN SERPL BCP-MCNC: 3.1 G/DL (ref 3.5–5.2)
ALBUMIN SERPL BCP-MCNC: 3.1 G/DL (ref 3.5–5.2)
ALBUMIN SERPL BCP-MCNC: 3.4 G/DL (ref 3.5–5.2)
ALBUMIN SERPL BCP-MCNC: 3.5 G/DL (ref 3.5–5.2)
ALBUMIN SERPL BCP-MCNC: 3.9 G/DL (ref 3.5–5.2)
ALP SERPL-CCNC: 168 U/L (ref 55–135)
ALP SERPL-CCNC: 183 U/L (ref 55–135)
ALP SERPL-CCNC: 189 U/L (ref 55–135)
ALP SERPL-CCNC: 192 U/L (ref 55–135)
ALP SERPL-CCNC: 196 U/L (ref 55–135)
ALP SERPL-CCNC: 207 U/L (ref 55–135)
ALP SERPL-CCNC: 211 U/L (ref 55–135)
ALP SERPL-CCNC: 219 U/L (ref 55–135)
ALP SERPL-CCNC: 223 U/L (ref 55–135)
ALP SERPL-CCNC: 231 U/L (ref 55–135)
ALT SERPL W/O P-5'-P-CCNC: 10 U/L (ref 10–44)
ALT SERPL W/O P-5'-P-CCNC: 10 U/L (ref 10–44)
ALT SERPL W/O P-5'-P-CCNC: 11 U/L (ref 10–44)
ALT SERPL W/O P-5'-P-CCNC: 11 U/L (ref 10–44)
ALT SERPL W/O P-5'-P-CCNC: 12 U/L (ref 10–44)
ALT SERPL W/O P-5'-P-CCNC: 14 U/L (ref 10–44)
ALT SERPL W/O P-5'-P-CCNC: 15 U/L (ref 10–44)
ALT SERPL W/O P-5'-P-CCNC: 16 U/L (ref 10–44)
ALT SERPL W/O P-5'-P-CCNC: 17 U/L (ref 10–44)
ALT SERPL W/O P-5'-P-CCNC: 30 U/L (ref 10–44)
AMYLASE SERPL-CCNC: 88 U/L (ref 20–110)
ANION GAP SERPL CALC-SCNC: 10 MMOL/L (ref 8–16)
ANION GAP SERPL CALC-SCNC: 11 MMOL/L (ref 8–16)
ANION GAP SERPL CALC-SCNC: 13 MMOL/L (ref 8–16)
ANION GAP SERPL CALC-SCNC: 14 MMOL/L (ref 8–16)
ANION GAP SERPL CALC-SCNC: 15 MMOL/L (ref 8–16)
AORTIC ROOT ANNULUS: 3.46 CM
APTT BLDCRRT: 28.7 SEC (ref 21–32)
ASCENDING AORTA: 3.11 CM
AST SERPL-CCNC: 11 U/L (ref 10–40)
AST SERPL-CCNC: 12 U/L (ref 10–40)
AST SERPL-CCNC: 13 U/L (ref 10–40)
AST SERPL-CCNC: 14 U/L (ref 10–40)
AST SERPL-CCNC: 14 U/L (ref 10–40)
AST SERPL-CCNC: 16 U/L (ref 10–40)
AST SERPL-CCNC: 17 U/L (ref 10–40)
AST SERPL-CCNC: 41 U/L (ref 10–40)
AV INDEX (PROSTH): 0.63
AV MEAN GRADIENT: 11 MMHG
AV PEAK GRADIENT: 14 MMHG
AV VALVE AREA: 1.71 CM2
AV VELOCITY RATIO: 0.61
BACTERIA #/AREA URNS HPF: ABNORMAL /HPF
BACTERIA #/AREA URNS HPF: NORMAL /HPF
BACTERIA SPEC AEROBE CULT: ABNORMAL
BACTERIA SPEC AEROBE CULT: ABNORMAL
BACTERIA UR CULT: NORMAL
BACTERIA UR CULT: NORMAL
BASOPHILS # BLD AUTO: 0.03 K/UL (ref 0–0.2)
BASOPHILS # BLD AUTO: 0.04 K/UL (ref 0–0.2)
BASOPHILS # BLD AUTO: 0.05 K/UL (ref 0–0.2)
BASOPHILS # BLD AUTO: 0.06 K/UL (ref 0–0.2)
BASOPHILS # BLD AUTO: 0.07 K/UL (ref 0–0.2)
BASOPHILS NFR BLD: 0.3 % (ref 0–1.9)
BASOPHILS NFR BLD: 0.5 % (ref 0–1.9)
BASOPHILS NFR BLD: 0.6 % (ref 0–1.9)
BASOPHILS NFR BLD: 0.6 % (ref 0–1.9)
BASOPHILS NFR BLD: 0.8 % (ref 0–1.9)
BASOPHILS NFR BLD: 0.8 % (ref 0–1.9)
BASOPHILS NFR BLD: 1 % (ref 0–1.9)
BILIRUB SERPL-MCNC: 0.2 MG/DL (ref 0.1–1)
BILIRUB SERPL-MCNC: 0.3 MG/DL (ref 0.1–1)
BILIRUB SERPL-MCNC: 0.4 MG/DL (ref 0.1–1)
BILIRUB SERPL-MCNC: 0.5 MG/DL (ref 0.1–1)
BILIRUB SERPL-MCNC: 0.5 MG/DL (ref 0.1–1)
BILIRUB SERPL-MCNC: 0.6 MG/DL (ref 0.1–1)
BILIRUB SERPL-MCNC: 0.7 MG/DL (ref 0.1–1)
BILIRUB UR QL STRIP: NEGATIVE
BILIRUB UR QL STRIP: NEGATIVE
BNP SERPL-MCNC: 1086 PG/ML (ref 0–99)
BNP SERPL-MCNC: 31 PG/ML (ref 0–99)
BNP SERPL-MCNC: 390 PG/ML (ref 0–99)
BNP SERPL-MCNC: 54 PG/ML (ref 0–99)
BNP SERPL-MCNC: 59 PG/ML (ref 0–99)
BNP SERPL-MCNC: 695 PG/ML (ref 0–99)
BSA FOR ECHO PROCEDURE: 2.01 M2
BUN SERPL-MCNC: 24 MG/DL (ref 8–23)
BUN SERPL-MCNC: 24 MG/DL (ref 8–23)
BUN SERPL-MCNC: 25 MG/DL (ref 8–23)
BUN SERPL-MCNC: 26 MG/DL (ref 8–23)
BUN SERPL-MCNC: 26 MG/DL (ref 8–23)
BUN SERPL-MCNC: 27 MG/DL (ref 8–23)
BUN SERPL-MCNC: 28 MG/DL (ref 8–23)
BUN SERPL-MCNC: 30 MG/DL (ref 8–23)
CALCIUM SERPL-MCNC: 10 MG/DL (ref 8.7–10.5)
CALCIUM SERPL-MCNC: 8.3 MG/DL (ref 8.7–10.5)
CALCIUM SERPL-MCNC: 8.5 MG/DL (ref 8.7–10.5)
CALCIUM SERPL-MCNC: 8.5 MG/DL (ref 8.7–10.5)
CALCIUM SERPL-MCNC: 8.6 MG/DL (ref 8.7–10.5)
CALCIUM SERPL-MCNC: 8.6 MG/DL (ref 8.7–10.5)
CALCIUM SERPL-MCNC: 8.7 MG/DL (ref 8.7–10.5)
CALCIUM SERPL-MCNC: 8.9 MG/DL (ref 8.7–10.5)
CALCIUM SERPL-MCNC: 8.9 MG/DL (ref 8.7–10.5)
CALCIUM SERPL-MCNC: 9 MG/DL (ref 8.7–10.5)
CEA SERPL-MCNC: 2.3 NG/ML (ref 0–5)
CHLORIDE SERPL-SCNC: 100 MMOL/L (ref 95–110)
CHLORIDE SERPL-SCNC: 105 MMOL/L (ref 95–110)
CHLORIDE SERPL-SCNC: 106 MMOL/L (ref 95–110)
CHLORIDE SERPL-SCNC: 106 MMOL/L (ref 95–110)
CHLORIDE SERPL-SCNC: 108 MMOL/L (ref 95–110)
CHLORIDE SERPL-SCNC: 109 MMOL/L (ref 95–110)
CHLORIDE SERPL-SCNC: 110 MMOL/L (ref 95–110)
CHOLEST SERPL-MCNC: 118 MG/DL (ref 120–199)
CHOLEST SERPL-MCNC: 134 MG/DL (ref 120–199)
CHOLEST/HDLC SERPL: 2.9 {RATIO} (ref 2–5)
CHOLEST/HDLC SERPL: 3.3 {RATIO} (ref 2–5)
CK SERPL-CCNC: 102 U/L (ref 20–200)
CLARITY UR: CLEAR
CLARITY UR: CLEAR
CO2 SERPL-SCNC: 15 MMOL/L (ref 23–29)
CO2 SERPL-SCNC: 15 MMOL/L (ref 23–29)
CO2 SERPL-SCNC: 16 MMOL/L (ref 23–29)
CO2 SERPL-SCNC: 18 MMOL/L (ref 23–29)
CO2 SERPL-SCNC: 18 MMOL/L (ref 23–29)
CO2 SERPL-SCNC: 19 MMOL/L (ref 23–29)
COLOR UR: YELLOW
COLOR UR: YELLOW
CREAT SERPL-MCNC: 2 MG/DL (ref 0.5–1.4)
CREAT SERPL-MCNC: 2.1 MG/DL (ref 0.5–1.4)
CREAT SERPL-MCNC: 2.1 MG/DL (ref 0.5–1.4)
CREAT SERPL-MCNC: 2.2 MG/DL (ref 0.5–1.4)
CREAT SERPL-MCNC: 2.2 MG/DL (ref 0.5–1.4)
CREAT SERPL-MCNC: 2.3 MG/DL (ref 0.5–1.4)
CREAT SERPL-MCNC: 2.3 MG/DL (ref 0.5–1.4)
CREAT SERPL-MCNC: 2.4 MG/DL (ref 0.5–1.4)
CREAT SERPL-MCNC: 2.4 MG/DL (ref 0.5–1.4)
CREAT SERPL-MCNC: 2.5 MG/DL (ref 0.5–1.4)
CRP SERPL-MCNC: 32.9 MG/L (ref 0–8.2)
CRP SERPL-MCNC: 90 MG/L (ref 0–8.2)
CTP QC/QA: YES
CV ECHO LV RWT: 0.58 CM
DIFFERENTIAL METHOD: ABNORMAL
DOP CALC AO PEAK VEL: 1.86 M/S
DOP CALC AO VTI: 43.04 CM
DOP CALC LVOT AREA: 2.7 CM2
DOP CALC LVOT DIAMETER: 1.86 CM
DOP CALC LVOT PEAK VEL: 1.14 M/S
DOP CALC LVOT STROKE VOLUME: 73.52 CM3
DOP CALC RVOT PEAK VEL: 0.98 M/S
DOP CALC RVOT VTI: 23.96 CM
DOP CALCLVOT PEAK VEL VTI: 27.07 CM
E WAVE DECELERATION TIME: 193.06 MSEC
E/A RATIO: 1.3
ECHO LV POSTERIOR WALL: 1.34 CM (ref 0.6–1.1)
EOSINOPHIL # BLD AUTO: 0.1 K/UL (ref 0–0.5)
EOSINOPHIL # BLD AUTO: 0.1 K/UL (ref 0–0.5)
EOSINOPHIL # BLD AUTO: 0.2 K/UL (ref 0–0.5)
EOSINOPHIL # BLD AUTO: 0.3 K/UL (ref 0–0.5)
EOSINOPHIL # BLD AUTO: 0.4 K/UL (ref 0–0.5)
EOSINOPHIL NFR BLD: 1.8 % (ref 0–8)
EOSINOPHIL NFR BLD: 1.9 % (ref 0–8)
EOSINOPHIL NFR BLD: 2 % (ref 0–8)
EOSINOPHIL NFR BLD: 2.2 % (ref 0–8)
EOSINOPHIL NFR BLD: 3 % (ref 0–8)
EOSINOPHIL NFR BLD: 4.6 % (ref 0–8)
EOSINOPHIL NFR BLD: 4.8 % (ref 0–8)
ERYTHROCYTE [DISTWIDTH] IN BLOOD BY AUTOMATED COUNT: 14.6 % (ref 11.5–14.5)
ERYTHROCYTE [DISTWIDTH] IN BLOOD BY AUTOMATED COUNT: 14.8 % (ref 11.5–14.5)
ERYTHROCYTE [DISTWIDTH] IN BLOOD BY AUTOMATED COUNT: 14.9 % (ref 11.5–14.5)
ERYTHROCYTE [DISTWIDTH] IN BLOOD BY AUTOMATED COUNT: 14.9 % (ref 11.5–14.5)
ERYTHROCYTE [DISTWIDTH] IN BLOOD BY AUTOMATED COUNT: 15.2 % (ref 11.5–14.5)
ERYTHROCYTE [DISTWIDTH] IN BLOOD BY AUTOMATED COUNT: 15.4 % (ref 11.5–14.5)
ERYTHROCYTE [DISTWIDTH] IN BLOOD BY AUTOMATED COUNT: 19.8 % (ref 11.5–14.5)
ERYTHROCYTE [SEDIMENTATION RATE] IN BLOOD BY WESTERGREN METHOD: 130 MM/HR (ref 0–10)
EST. GFR  (AFRICAN AMERICAN): 28.6 ML/MIN/1.73 M^2
EST. GFR  (AFRICAN AMERICAN): 30 ML/MIN/1.73 M^2
EST. GFR  (AFRICAN AMERICAN): 30 ML/MIN/1.73 M^2
EST. GFR  (AFRICAN AMERICAN): 31.6 ML/MIN/1.73 M^2
EST. GFR  (AFRICAN AMERICAN): 32 ML/MIN/1.73 M^2
EST. GFR  (AFRICAN AMERICAN): 33 ML/MIN/1.73 M^2
EST. GFR  (AFRICAN AMERICAN): 33.4 ML/MIN/1.73 M^2
EST. GFR  (AFRICAN AMERICAN): 35 ML/MIN/1.73 M^2
EST. GFR  (AFRICAN AMERICAN): 35 ML/MIN/1.73 M^2
EST. GFR  (AFRICAN AMERICAN): 37 ML/MIN/1.73 M^2
EST. GFR  (NON AFRICAN AMERICAN): 24.7 ML/MIN/1.73 M^2
EST. GFR  (NON AFRICAN AMERICAN): 26 ML/MIN/1.73 M^2
EST. GFR  (NON AFRICAN AMERICAN): 26 ML/MIN/1.73 M^2
EST. GFR  (NON AFRICAN AMERICAN): 27 ML/MIN/1.73 M^2
EST. GFR  (NON AFRICAN AMERICAN): 27.3 ML/MIN/1.73 M^2
EST. GFR  (NON AFRICAN AMERICAN): 28.9 ML/MIN/1.73 M^2
EST. GFR  (NON AFRICAN AMERICAN): 29 ML/MIN/1.73 M^2
EST. GFR  (NON AFRICAN AMERICAN): 31 ML/MIN/1.73 M^2
EST. GFR  (NON AFRICAN AMERICAN): 31 ML/MIN/1.73 M^2
EST. GFR  (NON AFRICAN AMERICAN): 32 ML/MIN/1.73 M^2
FRACTIONAL SHORTENING: 26 % (ref 28–44)
GLUCOSE SERPL-MCNC: 102 MG/DL (ref 70–110)
GLUCOSE SERPL-MCNC: 102 MG/DL (ref 70–110)
GLUCOSE SERPL-MCNC: 105 MG/DL (ref 70–110)
GLUCOSE SERPL-MCNC: 110 MG/DL (ref 70–110)
GLUCOSE SERPL-MCNC: 116 MG/DL (ref 70–110)
GLUCOSE SERPL-MCNC: 126 MG/DL (ref 70–110)
GLUCOSE SERPL-MCNC: 129 MG/DL (ref 70–110)
GLUCOSE SERPL-MCNC: 131 MG/DL (ref 70–110)
GLUCOSE SERPL-MCNC: 88 MG/DL (ref 70–110)
GLUCOSE SERPL-MCNC: 96 MG/DL (ref 70–110)
GLUCOSE UR QL STRIP: NEGATIVE
GLUCOSE UR QL STRIP: NEGATIVE
HCT VFR BLD AUTO: 26.7 % (ref 40–54)
HCT VFR BLD AUTO: 28.5 % (ref 40–54)
HCT VFR BLD AUTO: 29 % (ref 40–54)
HCT VFR BLD AUTO: 29.1 % (ref 40–54)
HCT VFR BLD AUTO: 31.9 % (ref 40–54)
HCT VFR BLD AUTO: 32.1 % (ref 40–54)
HCT VFR BLD AUTO: 33 % (ref 40–54)
HCV AB SERPL QL IA: NEGATIVE
HDLC SERPL-MCNC: 41 MG/DL (ref 40–75)
HDLC SERPL-MCNC: 41 MG/DL (ref 40–75)
HDLC SERPL: 30.6 % (ref 20–50)
HDLC SERPL: 34.7 % (ref 20–50)
HGB BLD-MCNC: 10.1 G/DL (ref 14–18)
HGB BLD-MCNC: 10.3 G/DL (ref 14–18)
HGB BLD-MCNC: 10.3 G/DL (ref 14–18)
HGB BLD-MCNC: 8.4 G/DL (ref 14–18)
HGB BLD-MCNC: 8.9 G/DL (ref 14–18)
HGB BLD-MCNC: 8.9 G/DL (ref 14–18)
HGB BLD-MCNC: 9 G/DL (ref 14–18)
HGB UR QL STRIP: NEGATIVE
HGB UR QL STRIP: NEGATIVE
HYALINE CASTS #/AREA URNS LPF: 0 /LPF
HYALINE CASTS #/AREA URNS LPF: 1 /LPF
IMM GRANULOCYTES # BLD AUTO: 0.01 K/UL (ref 0–0.04)
IMM GRANULOCYTES # BLD AUTO: 0.02 K/UL (ref 0–0.04)
IMM GRANULOCYTES # BLD AUTO: 0.03 K/UL (ref 0–0.04)
IMM GRANULOCYTES # BLD AUTO: 0.04 K/UL (ref 0–0.04)
IMM GRANULOCYTES # BLD AUTO: 0.04 K/UL (ref 0–0.04)
IMM GRANULOCYTES NFR BLD AUTO: 0.2 % (ref 0–0.5)
IMM GRANULOCYTES NFR BLD AUTO: 0.3 % (ref 0–0.5)
IMM GRANULOCYTES NFR BLD AUTO: 0.4 % (ref 0–0.5)
IMM GRANULOCYTES NFR BLD AUTO: 0.4 % (ref 0–0.5)
IMM GRANULOCYTES NFR BLD AUTO: 0.5 % (ref 0–0.5)
IMM GRANULOCYTES NFR BLD AUTO: 0.5 % (ref 0–0.5)
IMM GRANULOCYTES NFR BLD AUTO: 0.6 % (ref 0–0.5)
INR PPP: 1 (ref 0.8–1.2)
INTERVENTRICULAR SEPTUM: 1.41 CM (ref 0.6–1.1)
IVRT: 65.74 MSEC
KETONES UR QL STRIP: NEGATIVE
KETONES UR QL STRIP: NEGATIVE
LA MAJOR: 4.5 CM
LA WIDTH: 3.26 CM
LDLC SERPL CALC-MCNC: 58.2 MG/DL (ref 63–159)
LDLC SERPL CALC-MCNC: 65.2 MG/DL (ref 63–159)
LEFT ATRIUM SIZE: 4.09 CM
LEFT INTERNAL DIMENSION IN SYSTOLE: 3.42 CM (ref 2.1–4)
LEFT VENTRICLE DIASTOLIC VOLUME INDEX: 49.23 ML/M2
LEFT VENTRICLE DIASTOLIC VOLUME: 99.44 ML
LEFT VENTRICLE MASS INDEX: 125 G/M2
LEFT VENTRICLE SYSTOLIC VOLUME INDEX: 23.8 ML/M2
LEFT VENTRICLE SYSTOLIC VOLUME: 48.05 ML
LEFT VENTRICULAR INTERNAL DIMENSION IN DIASTOLE: 4.64 CM (ref 3.5–6)
LEFT VENTRICULAR MASS: 253.27 G
LEUKOCYTE ESTERASE UR QL STRIP: ABNORMAL
LEUKOCYTE ESTERASE UR QL STRIP: ABNORMAL
LIPASE SERPL-CCNC: 18 U/L (ref 4–60)
LIPASE SERPL-CCNC: 29 U/L (ref 4–60)
LYMPHOCYTES # BLD AUTO: 0.5 K/UL (ref 1–4.8)
LYMPHOCYTES # BLD AUTO: 0.5 K/UL (ref 1–4.8)
LYMPHOCYTES # BLD AUTO: 0.6 K/UL (ref 1–4.8)
LYMPHOCYTES # BLD AUTO: 0.6 K/UL (ref 1–4.8)
LYMPHOCYTES # BLD AUTO: 0.7 K/UL (ref 1–4.8)
LYMPHOCYTES # BLD AUTO: 0.8 K/UL (ref 1–4.8)
LYMPHOCYTES # BLD AUTO: 1 K/UL (ref 1–4.8)
LYMPHOCYTES NFR BLD: 12.4 % (ref 18–48)
LYMPHOCYTES NFR BLD: 13.3 % (ref 18–48)
LYMPHOCYTES NFR BLD: 13.3 % (ref 18–48)
LYMPHOCYTES NFR BLD: 14.7 % (ref 18–48)
LYMPHOCYTES NFR BLD: 6 % (ref 18–48)
LYMPHOCYTES NFR BLD: 7 % (ref 18–48)
LYMPHOCYTES NFR BLD: 7.6 % (ref 18–48)
MCH RBC QN AUTO: 26.5 PG (ref 27–31)
MCH RBC QN AUTO: 26.5 PG (ref 27–31)
MCH RBC QN AUTO: 26.9 PG (ref 27–31)
MCH RBC QN AUTO: 26.9 PG (ref 27–31)
MCH RBC QN AUTO: 27.7 PG (ref 27–31)
MCH RBC QN AUTO: 27.9 PG (ref 27–31)
MCH RBC QN AUTO: 28 PG (ref 27–31)
MCHC RBC AUTO-ENTMCNC: 30.6 G/DL (ref 32–36)
MCHC RBC AUTO-ENTMCNC: 31 G/DL (ref 32–36)
MCHC RBC AUTO-ENTMCNC: 31.2 G/DL (ref 32–36)
MCHC RBC AUTO-ENTMCNC: 31.2 G/DL (ref 32–36)
MCHC RBC AUTO-ENTMCNC: 31.5 G/DL (ref 32–36)
MCHC RBC AUTO-ENTMCNC: 31.5 G/DL (ref 32–36)
MCHC RBC AUTO-ENTMCNC: 32.3 G/DL (ref 32–36)
MCV RBC AUTO: 84 FL (ref 82–98)
MCV RBC AUTO: 86 FL (ref 82–98)
MCV RBC AUTO: 87 FL (ref 82–98)
MCV RBC AUTO: 89 FL (ref 82–98)
MCV RBC AUTO: 89 FL (ref 82–98)
MICROSCOPIC COMMENT: ABNORMAL
MICROSCOPIC COMMENT: NORMAL
MONOCYTES # BLD AUTO: 0.5 K/UL (ref 0.3–1)
MONOCYTES # BLD AUTO: 0.7 K/UL (ref 0.3–1)
MONOCYTES # BLD AUTO: 0.7 K/UL (ref 0.3–1)
MONOCYTES NFR BLD: 10.8 % (ref 4–15)
MONOCYTES NFR BLD: 10.9 % (ref 4–15)
MONOCYTES NFR BLD: 6 % (ref 4–15)
MONOCYTES NFR BLD: 7.2 % (ref 4–15)
MONOCYTES NFR BLD: 7.6 % (ref 4–15)
MONOCYTES NFR BLD: 8.2 % (ref 4–15)
MONOCYTES NFR BLD: 8.3 % (ref 4–15)
MV PEAK A VEL: 0.84 M/S
MV PEAK E VEL: 1.09 M/S
NEUTROPHILS # BLD AUTO: 3.4 K/UL (ref 1.8–7.7)
NEUTROPHILS # BLD AUTO: 3.5 K/UL (ref 1.8–7.7)
NEUTROPHILS # BLD AUTO: 5.1 K/UL (ref 1.8–7.7)
NEUTROPHILS # BLD AUTO: 5.1 K/UL (ref 1.8–7.7)
NEUTROPHILS # BLD AUTO: 5.7 K/UL (ref 1.8–7.7)
NEUTROPHILS # BLD AUTO: 6.4 K/UL (ref 1.8–7.7)
NEUTROPHILS # BLD AUTO: 7.7 K/UL (ref 1.8–7.7)
NEUTROPHILS NFR BLD: 68.9 % (ref 38–73)
NEUTROPHILS NFR BLD: 72.5 % (ref 38–73)
NEUTROPHILS NFR BLD: 72.8 % (ref 38–73)
NEUTROPHILS NFR BLD: 76.5 % (ref 38–73)
NEUTROPHILS NFR BLD: 81.4 % (ref 38–73)
NEUTROPHILS NFR BLD: 83.1 % (ref 38–73)
NEUTROPHILS NFR BLD: 83.7 % (ref 38–73)
NITRITE UR QL STRIP: NEGATIVE
NITRITE UR QL STRIP: NEGATIVE
NONHDLC SERPL-MCNC: 77 MG/DL
NONHDLC SERPL-MCNC: 93 MG/DL
NRBC BLD-RTO: 0 /100 WBC
PH UR STRIP: 6 [PH] (ref 5–8)
PH UR STRIP: 6 [PH] (ref 5–8)
PLATELET # BLD AUTO: 188 K/UL (ref 150–450)
PLATELET # BLD AUTO: 206 K/UL (ref 150–350)
PLATELET # BLD AUTO: 217 K/UL (ref 150–350)
PLATELET # BLD AUTO: 262 K/UL (ref 150–350)
PLATELET # BLD AUTO: 284 K/UL (ref 150–350)
PLATELET # BLD AUTO: 301 K/UL (ref 150–350)
PLATELET # BLD AUTO: 338 K/UL (ref 150–350)
PMV BLD AUTO: 9.2 FL (ref 9.2–12.9)
PMV BLD AUTO: 9.2 FL (ref 9.2–12.9)
PMV BLD AUTO: 9.3 FL (ref 9.2–12.9)
PMV BLD AUTO: 9.5 FL (ref 9.2–12.9)
PMV BLD AUTO: 9.5 FL (ref 9.2–12.9)
PMV BLD AUTO: 9.7 FL (ref 9.2–12.9)
PMV BLD AUTO: 9.9 FL (ref 9.2–12.9)
POTASSIUM SERPL-SCNC: 4.1 MMOL/L (ref 3.5–5.1)
POTASSIUM SERPL-SCNC: 4.3 MMOL/L (ref 3.5–5.1)
POTASSIUM SERPL-SCNC: 4.4 MMOL/L (ref 3.5–5.1)
POTASSIUM SERPL-SCNC: 4.4 MMOL/L (ref 3.5–5.1)
POTASSIUM SERPL-SCNC: 4.6 MMOL/L (ref 3.5–5.1)
POTASSIUM SERPL-SCNC: 4.7 MMOL/L (ref 3.5–5.1)
POTASSIUM SERPL-SCNC: 4.8 MMOL/L (ref 3.5–5.1)
PROT SERPL-MCNC: 7.7 G/DL (ref 6–8.4)
PROT SERPL-MCNC: 7.7 G/DL (ref 6–8.4)
PROT SERPL-MCNC: 8.1 G/DL (ref 6–8.4)
PROT SERPL-MCNC: 8.3 G/DL (ref 6–8.4)
PROT SERPL-MCNC: 8.6 G/DL (ref 6–8.4)
PROT SERPL-MCNC: 8.7 G/DL (ref 6–8.4)
PROT SERPL-MCNC: 8.8 G/DL (ref 6–8.4)
PROT SERPL-MCNC: 8.8 G/DL (ref 6–8.4)
PROT SERPL-MCNC: 9 G/DL (ref 6–8.4)
PROT SERPL-MCNC: 9 G/DL (ref 6–8.4)
PROT UR QL STRIP: ABNORMAL
PROT UR QL STRIP: ABNORMAL
PROTHROMBIN TIME: 11.4 SEC (ref 9–12.5)
PV MEAN GRADIENT: 3 MMHG
RA MAJOR: 4.09 CM
RA PRESSURE: 15 MMHG
RA WIDTH: 4.15 CM
RBC # BLD AUTO: 3.17 M/UL (ref 4.6–6.2)
RBC # BLD AUTO: 3.31 M/UL (ref 4.6–6.2)
RBC # BLD AUTO: 3.34 M/UL (ref 4.6–6.2)
RBC # BLD AUTO: 3.36 M/UL (ref 4.6–6.2)
RBC # BLD AUTO: 3.62 M/UL (ref 4.6–6.2)
RBC # BLD AUTO: 3.68 M/UL (ref 4.6–6.2)
RBC # BLD AUTO: 3.72 M/UL (ref 4.6–6.2)
RBC #/AREA URNS HPF: 0 /HPF (ref 0–4)
RBC #/AREA URNS HPF: 1 /HPF (ref 0–4)
SARS-COV-2 RDRP RESP QL NAA+PROBE: NEGATIVE
SARS-COV-2 RDRP RESP QL NAA+PROBE: NEGATIVE
SARS-COV-2 RDRP RESP QL NAA+PROBE: POSITIVE
SINUS: 3.31 CM
SODIUM SERPL-SCNC: 130 MMOL/L (ref 136–145)
SODIUM SERPL-SCNC: 134 MMOL/L (ref 136–145)
SODIUM SERPL-SCNC: 135 MMOL/L (ref 136–145)
SODIUM SERPL-SCNC: 135 MMOL/L (ref 136–145)
SODIUM SERPL-SCNC: 136 MMOL/L (ref 136–145)
SODIUM SERPL-SCNC: 137 MMOL/L (ref 136–145)
SODIUM SERPL-SCNC: 137 MMOL/L (ref 136–145)
SODIUM SERPL-SCNC: 138 MMOL/L (ref 136–145)
SP GR UR STRIP: 1.02 (ref 1–1.03)
SP GR UR STRIP: 1.02 (ref 1–1.03)
STJ: 3.03 CM
TRICUSPID ANNULAR PLANE SYSTOLIC EXCURSION: 1.83 CM
TRIGL SERPL-MCNC: 174 MG/DL (ref 30–150)
TRIGL SERPL-MCNC: 59 MG/DL (ref 30–150)
TROPONIN I SERPL DL<=0.01 NG/ML-MCNC: 0.01 NG/ML (ref 0–0.03)
TROPONIN I SERPL DL<=0.01 NG/ML-MCNC: 0.02 NG/ML (ref 0–0.03)
TROPONIN I SERPL DL<=0.01 NG/ML-MCNC: 0.02 NG/ML (ref 0–0.03)
TROPONIN I SERPL DL<=0.01 NG/ML-MCNC: 0.04 NG/ML (ref 0–0.03)
URN SPEC COLLECT METH UR: ABNORMAL
URN SPEC COLLECT METH UR: ABNORMAL
UROBILINOGEN UR STRIP-ACNC: NEGATIVE EU/DL
UROBILINOGEN UR STRIP-ACNC: NEGATIVE EU/DL
WBC # BLD AUTO: 4.75 K/UL (ref 3.9–12.7)
WBC # BLD AUTO: 5.02 K/UL (ref 3.9–12.7)
WBC # BLD AUTO: 6.2 K/UL (ref 3.9–12.7)
WBC # BLD AUTO: 6.71 K/UL (ref 3.9–12.7)
WBC # BLD AUTO: 7.69 K/UL (ref 3.9–12.7)
WBC # BLD AUTO: 7.84 K/UL (ref 3.9–12.7)
WBC # BLD AUTO: 9.23 K/UL (ref 3.9–12.7)
WBC #/AREA URNS HPF: 0 /HPF (ref 0–5)
WBC #/AREA URNS HPF: 15 /HPF (ref 0–5)

## 2021-01-01 PROCEDURE — 1126F PR PAIN SEVERITY QUANTIFIED, NO PAIN PRESENT: ICD-10-PCS | Mod: S$GLB,,, | Performed by: PODIATRIST

## 2021-01-01 PROCEDURE — 85610 PROTHROMBIN TIME: CPT

## 2021-01-01 PROCEDURE — 25000003 PHARM REV CODE 250: Performed by: HOSPITALIST

## 2021-01-01 PROCEDURE — G0008 ADMIN INFLUENZA VIRUS VAC: HCPCS | Mod: HCNC,S$GLB,, | Performed by: FAMILY MEDICINE

## 2021-01-01 PROCEDURE — 3078F PR MOST RECENT DIASTOLIC BLOOD PRESSURE < 80 MM HG: ICD-10-PCS | Mod: CPTII,S$GLB,, | Performed by: FAMILY MEDICINE

## 2021-01-01 PROCEDURE — 1159F MED LIST DOCD IN RCRD: CPT | Mod: HCNC,CPTII,S$GLB, | Performed by: UROLOGY

## 2021-01-01 PROCEDURE — 99214 OFFICE O/P EST MOD 30 MIN: CPT | Mod: S$GLB,,, | Performed by: COLON & RECTAL SURGERY

## 2021-01-01 PROCEDURE — U0002 COVID-19 LAB TEST NON-CDC: HCPCS | Performed by: EMERGENCY MEDICINE

## 2021-01-01 PROCEDURE — 1159F PR MEDICATION LIST DOCUMENTED IN MEDICAL RECORD: ICD-10-PCS | Mod: CPTII,S$GLB,, | Performed by: FAMILY MEDICINE

## 2021-01-01 PROCEDURE — 1160F RVW MEDS BY RX/DR IN RCRD: CPT | Mod: HCNC,CPTII,S$GLB, | Performed by: UROLOGY

## 2021-01-01 PROCEDURE — 1126F AMNT PAIN NOTED NONE PRSNT: CPT | Mod: HCNC,CPTII,S$GLB, | Performed by: COLON & RECTAL SURGERY

## 2021-01-01 PROCEDURE — 80053 COMPREHEN METABOLIC PANEL: CPT | Performed by: EMERGENCY MEDICINE

## 2021-01-01 PROCEDURE — 84484 ASSAY OF TROPONIN QUANT: CPT | Mod: 91

## 2021-01-01 PROCEDURE — 1160F PR REVIEW ALL MEDS BY PRESCRIBER/CLIN PHARMACIST DOCUMENTED: ICD-10-PCS | Mod: HCNC,CPTII,S$GLB, | Performed by: INTERNAL MEDICINE

## 2021-01-01 PROCEDURE — 99214 PR OFFICE/OUTPT VISIT, EST, LEVL IV, 30-39 MIN: ICD-10-PCS | Mod: S$GLB,,, | Performed by: COLON & RECTAL SURGERY

## 2021-01-01 PROCEDURE — 1126F PR PAIN SEVERITY QUANTIFIED, NO PAIN PRESENT: ICD-10-PCS | Mod: CPTII,S$GLB,, | Performed by: INTERNAL MEDICINE

## 2021-01-01 PROCEDURE — 99499 UNLISTED E&M SERVICE: CPT | Mod: HCNC,S$GLB,, | Performed by: PODIATRIST

## 2021-01-01 PROCEDURE — 3008F BODY MASS INDEX DOCD: CPT | Mod: CPTII,S$GLB,, | Performed by: INTERNAL MEDICINE

## 2021-01-01 PROCEDURE — 80061 LIPID PANEL: CPT | Mod: HCNC | Performed by: INTERNAL MEDICINE

## 2021-01-01 PROCEDURE — 1160F PR REVIEW ALL MEDS BY PRESCRIBER/CLIN PHARMACIST DOCUMENTED: ICD-10-PCS | Mod: CPTII,S$GLB,, | Performed by: COLON & RECTAL SURGERY

## 2021-01-01 PROCEDURE — 99999 PR PBB SHADOW E&M-EST. PATIENT-LVL V: ICD-10-PCS | Mod: PBBFAC,,, | Performed by: INTERNAL MEDICINE

## 2021-01-01 PROCEDURE — 99220 PR INITIAL OBSERVATION CARE,LEVL III: ICD-10-PCS | Mod: ,,, | Performed by: INTERNAL MEDICINE

## 2021-01-01 PROCEDURE — G0378 HOSPITAL OBSERVATION PER HR: HCPCS

## 2021-01-01 PROCEDURE — 99499 UNLISTED E&M SERVICE: CPT | Mod: HCNC,S$GLB,, | Performed by: INTERNAL MEDICINE

## 2021-01-01 PROCEDURE — 99499 RISK ADDL DX/OHS AUDIT: ICD-10-PCS | Mod: HCNC,S$GLB,, | Performed by: FAMILY MEDICINE

## 2021-01-01 PROCEDURE — 99214 OFFICE O/P EST MOD 30 MIN: CPT | Mod: HCNC,S$GLB,, | Performed by: INTERNAL MEDICINE

## 2021-01-01 PROCEDURE — 80053 COMPREHEN METABOLIC PANEL: CPT

## 2021-01-01 PROCEDURE — 74176 CT ABD & PELVIS W/O CONTRAST: CPT | Mod: TC,HCNC

## 2021-01-01 PROCEDURE — 3288F PR FALLS RISK ASSESSMENT DOCUMENTED: ICD-10-PCS | Mod: CPTII,S$GLB,, | Performed by: FAMILY MEDICINE

## 2021-01-01 PROCEDURE — 94640 AIRWAY INHALATION TREATMENT: CPT

## 2021-01-01 PROCEDURE — 1126F AMNT PAIN NOTED NONE PRSNT: CPT | Mod: CPTII,S$GLB,, | Performed by: INTERNAL MEDICINE

## 2021-01-01 PROCEDURE — 99999 PR PBB SHADOW E&M-EST. PATIENT-LVL III: ICD-10-PCS | Mod: PBBFAC,,, | Performed by: PODIATRIST

## 2021-01-01 PROCEDURE — 3288F PR FALLS RISK ASSESSMENT DOCUMENTED: ICD-10-PCS | Mod: CPTII,S$GLB,, | Performed by: INTERNAL MEDICINE

## 2021-01-01 PROCEDURE — 3288F FALL RISK ASSESSMENT DOCD: CPT | Mod: CPTII,S$GLB,, | Performed by: INTERNAL MEDICINE

## 2021-01-01 PROCEDURE — 3008F PR BODY MASS INDEX (BMI) DOCUMENTED: ICD-10-PCS | Mod: CPTII,S$GLB,, | Performed by: PODIATRIST

## 2021-01-01 PROCEDURE — 1160F RVW MEDS BY RX/DR IN RCRD: CPT | Mod: CPTII,S$GLB,, | Performed by: COLON & RECTAL SURGERY

## 2021-01-01 PROCEDURE — 85025 COMPLETE CBC W/AUTO DIFF WBC: CPT

## 2021-01-01 PROCEDURE — 25000003 PHARM REV CODE 250: Performed by: FAMILY MEDICINE

## 2021-01-01 PROCEDURE — 1159F PR MEDICATION LIST DOCUMENTED IN MEDICAL RECORD: ICD-10-PCS | Mod: S$GLB,,, | Performed by: PODIATRIST

## 2021-01-01 PROCEDURE — 99213 OFFICE O/P EST LOW 20 MIN: CPT | Mod: HCNC,S$GLB,, | Performed by: UROLOGY

## 2021-01-01 PROCEDURE — 85730 THROMBOPLASTIN TIME PARTIAL: CPT

## 2021-01-01 PROCEDURE — 3008F PR BODY MASS INDEX (BMI) DOCUMENTED: ICD-10-PCS | Mod: CPTII,S$GLB,, | Performed by: INTERNAL MEDICINE

## 2021-01-01 PROCEDURE — 3288F FALL RISK ASSESSMENT DOCD: CPT | Mod: CPTII,S$GLB,, | Performed by: FAMILY MEDICINE

## 2021-01-01 PROCEDURE — 99999 PR PBB SHADOW E&M-EST. PATIENT-LVL III: CPT | Mod: PBBFAC,HCNC,, | Performed by: INTERNAL MEDICINE

## 2021-01-01 PROCEDURE — 99999 PR PBB SHADOW E&M-EST. PATIENT-LVL III: CPT | Mod: PBBFAC,,, | Performed by: PODIATRIST

## 2021-01-01 PROCEDURE — 82378 CARCINOEMBRYONIC ANTIGEN: CPT | Mod: HCNC | Performed by: COLON & RECTAL SURGERY

## 2021-01-01 PROCEDURE — 99999 PR PBB SHADOW E&M-EST. PATIENT-LVL III: CPT | Mod: PBBFAC,,, | Performed by: INTERNAL MEDICINE

## 2021-01-01 PROCEDURE — 99225 PR SUBSEQUENT OBSERVATION CARE,LEVEL II: CPT | Mod: ,,, | Performed by: NURSE PRACTITIONER

## 2021-01-01 PROCEDURE — 99214 OFFICE O/P EST MOD 30 MIN: CPT | Mod: 25,S$GLB,, | Performed by: PODIATRIST

## 2021-01-01 PROCEDURE — 1159F PR MEDICATION LIST DOCUMENTED IN MEDICAL RECORD: ICD-10-PCS | Mod: S$GLB,,, | Performed by: INTERNAL MEDICINE

## 2021-01-01 PROCEDURE — 99214 OFFICE O/P EST MOD 30 MIN: CPT | Mod: S$GLB,,, | Performed by: INTERNAL MEDICINE

## 2021-01-01 PROCEDURE — 93010 ELECTROCARDIOGRAM REPORT: CPT | Mod: ,,, | Performed by: INTERNAL MEDICINE

## 2021-01-01 PROCEDURE — A9503 TC99M MEDRONATE: HCPCS

## 2021-01-01 PROCEDURE — 99499 UNLISTED E&M SERVICE: CPT | Mod: HCNC,S$GLB,, | Performed by: COLON & RECTAL SURGERY

## 2021-01-01 PROCEDURE — 99999 PR PBB SHADOW E&M-EST. PATIENT-LVL V: CPT | Mod: PBBFAC,,, | Performed by: INTERNAL MEDICINE

## 2021-01-01 PROCEDURE — 3008F PR BODY MASS INDEX (BMI) DOCUMENTED: ICD-10-PCS | Mod: CPTII,S$GLB,, | Performed by: FAMILY MEDICINE

## 2021-01-01 PROCEDURE — 99214 OFFICE O/P EST MOD 30 MIN: CPT | Mod: S$GLB,,, | Performed by: FAMILY MEDICINE

## 2021-01-01 PROCEDURE — 1160F RVW MEDS BY RX/DR IN RCRD: CPT | Mod: HCNC,CPTII,S$GLB, | Performed by: INTERNAL MEDICINE

## 2021-01-01 PROCEDURE — 84484 ASSAY OF TROPONIN QUANT: CPT | Performed by: EMERGENCY MEDICINE

## 2021-01-01 PROCEDURE — 99999 PR PBB SHADOW E&M-EST. PATIENT-LVL IV: CPT | Mod: PBBFAC,,, | Performed by: PODIATRIST

## 2021-01-01 PROCEDURE — 99499 UNLISTED E&M SERVICE: CPT | Mod: S$GLB,,, | Performed by: PODIATRIST

## 2021-01-01 PROCEDURE — 99999 PR PBB SHADOW E&M-EST. PATIENT-LVL IV: CPT | Mod: PBBFAC,HCNC,, | Performed by: INTERNAL MEDICINE

## 2021-01-01 PROCEDURE — 99214 PR OFFICE/OUTPT VISIT, EST, LEVL IV, 30-39 MIN: ICD-10-PCS | Mod: S$GLB,,, | Performed by: INTERNAL MEDICINE

## 2021-01-01 PROCEDURE — 86140 C-REACTIVE PROTEIN: CPT | Performed by: PODIATRIST

## 2021-01-01 PROCEDURE — 1101F PR PT FALLS ASSESS DOC 0-1 FALLS W/OUT INJ PAST YR: ICD-10-PCS | Mod: CPTII,S$GLB,, | Performed by: PODIATRIST

## 2021-01-01 PROCEDURE — 25000003 PHARM REV CODE 250: Performed by: NURSE PRACTITIONER

## 2021-01-01 PROCEDURE — 25000003 PHARM REV CODE 250: Performed by: EMERGENCY MEDICINE

## 2021-01-01 PROCEDURE — 3008F BODY MASS INDEX DOCD: CPT | Mod: CPTII,S$GLB,, | Performed by: PODIATRIST

## 2021-01-01 PROCEDURE — 1125F PR PAIN SEVERITY QUANTIFIED, PAIN PRESENT: ICD-10-PCS | Mod: S$GLB,,, | Performed by: PODIATRIST

## 2021-01-01 PROCEDURE — 3008F BODY MASS INDEX DOCD: CPT | Mod: HCNC,CPTII,S$GLB, | Performed by: COLON & RECTAL SURGERY

## 2021-01-01 PROCEDURE — 1126F PR PAIN SEVERITY QUANTIFIED, NO PAIN PRESENT: ICD-10-PCS | Mod: S$GLB,,, | Performed by: INTERNAL MEDICINE

## 2021-01-01 PROCEDURE — 1126F AMNT PAIN NOTED NONE PRSNT: CPT | Mod: S$GLB,,, | Performed by: INTERNAL MEDICINE

## 2021-01-01 PROCEDURE — 1160F RVW MEDS BY RX/DR IN RCRD: CPT | Mod: HCNC,CPTII,S$GLB, | Performed by: COLON & RECTAL SURGERY

## 2021-01-01 PROCEDURE — 1101F PT FALLS ASSESS-DOCD LE1/YR: CPT | Mod: HCNC,CPTII,S$GLB, | Performed by: UROLOGY

## 2021-01-01 PROCEDURE — 3078F PR MOST RECENT DIASTOLIC BLOOD PRESSURE < 80 MM HG: ICD-10-PCS | Mod: CPTII,S$GLB,, | Performed by: INTERNAL MEDICINE

## 2021-01-01 PROCEDURE — 1159F MED LIST DOCD IN RCRD: CPT | Mod: CPTII,S$GLB,, | Performed by: INTERNAL MEDICINE

## 2021-01-01 PROCEDURE — 99285 EMERGENCY DEPT VISIT HI MDM: CPT | Mod: 25

## 2021-01-01 PROCEDURE — U0002 COVID-19 LAB TEST NON-CDC: HCPCS | Mod: QW,HCNC,S$GLB, | Performed by: FAMILY MEDICINE

## 2021-01-01 PROCEDURE — 25000242 PHARM REV CODE 250 ALT 637 W/ HCPCS: Performed by: EMERGENCY MEDICINE

## 2021-01-01 PROCEDURE — 99999 PR PBB SHADOW E&M-EST. PATIENT-LVL III: ICD-10-PCS | Mod: PBBFAC,,, | Performed by: INTERNAL MEDICINE

## 2021-01-01 PROCEDURE — 3008F BODY MASS INDEX DOCD: CPT | Mod: HCNC,CPTII,S$GLB, | Performed by: INTERNAL MEDICINE

## 2021-01-01 PROCEDURE — 1126F AMNT PAIN NOTED NONE PRSNT: CPT | Mod: CPTII,S$GLB,, | Performed by: COLON & RECTAL SURGERY

## 2021-01-01 PROCEDURE — 3078F DIAST BP <80 MM HG: CPT | Mod: CPTII,S$GLB,, | Performed by: COLON & RECTAL SURGERY

## 2021-01-01 PROCEDURE — 99999 PR PBB SHADOW E&M-EST. PATIENT-LVL III: ICD-10-PCS | Mod: PBBFAC,HCNC,, | Performed by: INTERNAL MEDICINE

## 2021-01-01 PROCEDURE — 3288F FALL RISK ASSESSMENT DOCD: CPT | Mod: CPTII,S$GLB,, | Performed by: PODIATRIST

## 2021-01-01 PROCEDURE — 3008F PR BODY MASS INDEX (BMI) DOCUMENTED: ICD-10-PCS | Mod: CPTII,S$GLB,, | Performed by: COLON & RECTAL SURGERY

## 2021-01-01 PROCEDURE — 1101F PR PT FALLS ASSESS DOC 0-1 FALLS W/OUT INJ PAST YR: ICD-10-PCS | Mod: CPTII,S$GLB,, | Performed by: INTERNAL MEDICINE

## 2021-01-01 PROCEDURE — 78315 BONE IMAGING 3 PHASE: CPT | Mod: TC

## 2021-01-01 PROCEDURE — 36415 COLL VENOUS BLD VENIPUNCTURE: CPT

## 2021-01-01 PROCEDURE — 99213 OFFICE O/P EST LOW 20 MIN: CPT | Mod: S$GLB,,, | Performed by: PODIATRIST

## 2021-01-01 PROCEDURE — 3008F PR BODY MASS INDEX (BMI) DOCUMENTED: ICD-10-PCS | Mod: HCNC,CPTII,S$GLB, | Performed by: UROLOGY

## 2021-01-01 PROCEDURE — 3074F SYST BP LT 130 MM HG: CPT | Mod: HCNC,CPTII,S$GLB, | Performed by: COLON & RECTAL SURGERY

## 2021-01-01 PROCEDURE — 99225 PR SUBSEQUENT OBSERVATION CARE,LEVEL II: ICD-10-PCS | Mod: ,,, | Performed by: NURSE PRACTITIONER

## 2021-01-01 PROCEDURE — 3288F PR FALLS RISK ASSESSMENT DOCUMENTED: ICD-10-PCS | Mod: CPTII,S$GLB,, | Performed by: PODIATRIST

## 2021-01-01 PROCEDURE — 1101F PT FALLS ASSESS-DOCD LE1/YR: CPT | Mod: CPTII,S$GLB,, | Performed by: PODIATRIST

## 2021-01-01 PROCEDURE — 99214 PR OFFICE/OUTPT VISIT, EST, LEVL IV, 30-39 MIN: ICD-10-PCS | Mod: S$GLB,,, | Performed by: FAMILY MEDICINE

## 2021-01-01 PROCEDURE — 1126F AMNT PAIN NOTED NONE PRSNT: CPT | Mod: HCNC,CPTII,S$GLB, | Performed by: INTERNAL MEDICINE

## 2021-01-01 PROCEDURE — 3078F DIAST BP <80 MM HG: CPT | Mod: CPTII,S$GLB,, | Performed by: FAMILY MEDICINE

## 2021-01-01 PROCEDURE — 99999 PR PBB SHADOW E&M-EST. PATIENT-LVL IV: ICD-10-PCS | Mod: PBBFAC,,, | Performed by: PODIATRIST

## 2021-01-01 PROCEDURE — 63600175 PHARM REV CODE 636 W HCPCS: Mod: HCNC | Performed by: INTERNAL MEDICINE

## 2021-01-01 PROCEDURE — 99999 PR PBB SHADOW E&M-EST. PATIENT-LVL IV: ICD-10-PCS | Mod: PBBFAC,HCNC,, | Performed by: INTERNAL MEDICINE

## 2021-01-01 PROCEDURE — 3077F SYST BP >= 140 MM HG: CPT | Mod: HCNC,CPTII,S$GLB, | Performed by: UROLOGY

## 2021-01-01 PROCEDURE — 1101F PT FALLS ASSESS-DOCD LE1/YR: CPT | Mod: CPTII,S$GLB,, | Performed by: FAMILY MEDICINE

## 2021-01-01 PROCEDURE — 3008F PR BODY MASS INDEX (BMI) DOCUMENTED: ICD-10-PCS | Mod: HCNC,CPTII,S$GLB, | Performed by: INTERNAL MEDICINE

## 2021-01-01 PROCEDURE — 1126F PR PAIN SEVERITY QUANTIFIED, NO PAIN PRESENT: ICD-10-PCS | Mod: CPTII,S$GLB,, | Performed by: FAMILY MEDICINE

## 2021-01-01 PROCEDURE — 99999 PR PBB SHADOW E&M-EST. PATIENT-LVL IV: ICD-10-PCS | Mod: PBBFAC,,, | Performed by: COLON & RECTAL SURGERY

## 2021-01-01 PROCEDURE — 99499 RISK ADDL DX/OHS AUDIT: ICD-10-PCS | Mod: S$GLB,,, | Performed by: INTERNAL MEDICINE

## 2021-01-01 PROCEDURE — 3074F PR MOST RECENT SYSTOLIC BLOOD PRESSURE < 130 MM HG: ICD-10-PCS | Mod: HCNC,CPTII,S$GLB, | Performed by: COLON & RECTAL SURGERY

## 2021-01-01 PROCEDURE — 93005 ELECTROCARDIOGRAM TRACING: CPT

## 2021-01-01 PROCEDURE — 84484 ASSAY OF TROPONIN QUANT: CPT

## 2021-01-01 PROCEDURE — 99214 PR OFFICE/OUTPT VISIT, EST, LEVL IV, 30-39 MIN: ICD-10-PCS | Mod: 25,S$GLB,, | Performed by: PODIATRIST

## 2021-01-01 PROCEDURE — 27000221 HC OXYGEN, UP TO 24 HOURS

## 2021-01-01 PROCEDURE — 3075F PR MOST RECENT SYSTOLIC BLOOD PRESS GE 130-139MM HG: ICD-10-PCS | Mod: CPTII,S$GLB,, | Performed by: FAMILY MEDICINE

## 2021-01-01 PROCEDURE — 1160F PR REVIEW ALL MEDS BY PRESCRIBER/CLIN PHARMACIST DOCUMENTED: ICD-10-PCS | Mod: HCNC,CPTII,S$GLB, | Performed by: UROLOGY

## 2021-01-01 PROCEDURE — 1159F MED LIST DOCD IN RCRD: CPT | Mod: S$GLB,,, | Performed by: INTERNAL MEDICINE

## 2021-01-01 PROCEDURE — A9562 TC99M MERTIATIDE: HCPCS | Mod: HCNC

## 2021-01-01 PROCEDURE — 3074F PR MOST RECENT SYSTOLIC BLOOD PRESSURE < 130 MM HG: ICD-10-PCS | Mod: CPTII,S$GLB,, | Performed by: INTERNAL MEDICINE

## 2021-01-01 PROCEDURE — 84484 ASSAY OF TROPONIN QUANT: CPT | Mod: 91 | Performed by: EMERGENCY MEDICINE

## 2021-01-01 PROCEDURE — 80061 LIPID PANEL: CPT | Performed by: INTERNAL MEDICINE

## 2021-01-01 PROCEDURE — 90694 VACC AIIV4 NO PRSRV 0.5ML IM: CPT | Mod: HCNC,S$GLB,, | Performed by: FAMILY MEDICINE

## 2021-01-01 PROCEDURE — 87077 CULTURE AEROBIC IDENTIFY: CPT | Performed by: PODIATRIST

## 2021-01-01 PROCEDURE — 83880 ASSAY OF NATRIURETIC PEPTIDE: CPT

## 2021-01-01 PROCEDURE — 1125F AMNT PAIN NOTED PAIN PRSNT: CPT | Mod: S$GLB,,, | Performed by: FAMILY MEDICINE

## 2021-01-01 PROCEDURE — 99999 PR PBB SHADOW E&M-EST. PATIENT-LVL V: ICD-10-PCS | Mod: PBBFAC,,, | Performed by: FAMILY MEDICINE

## 2021-01-01 PROCEDURE — 82150 ASSAY OF AMYLASE: CPT

## 2021-01-01 PROCEDURE — 99999 PR PBB SHADOW E&M-EST. PATIENT-LVL IV: ICD-10-PCS | Mod: PBBFAC,HCNC,, | Performed by: UROLOGY

## 2021-01-01 PROCEDURE — 1159F MED LIST DOCD IN RCRD: CPT | Mod: S$GLB,,, | Performed by: PODIATRIST

## 2021-01-01 PROCEDURE — 1101F PR PT FALLS ASSESS DOC 0-1 FALLS W/OUT INJ PAST YR: ICD-10-PCS | Mod: HCNC,CPTII,S$GLB, | Performed by: UROLOGY

## 2021-01-01 PROCEDURE — 87186 SC STD MICRODIL/AGAR DIL: CPT | Performed by: PODIATRIST

## 2021-01-01 PROCEDURE — 99999 PR PBB SHADOW E&M-EST. PATIENT-LVL IV: CPT | Mod: PBBFAC,HCNC,, | Performed by: COLON & RECTAL SURGERY

## 2021-01-01 PROCEDURE — 3078F DIAST BP <80 MM HG: CPT | Mod: CPTII,S$GLB,, | Performed by: INTERNAL MEDICINE

## 2021-01-01 PROCEDURE — 94761 N-INVAS EAR/PLS OXIMETRY MLT: CPT

## 2021-01-01 PROCEDURE — 82550 ASSAY OF CK (CPK): CPT | Performed by: EMERGENCY MEDICINE

## 2021-01-01 PROCEDURE — 93010 EKG 12-LEAD: ICD-10-PCS | Mod: ,,, | Performed by: INTERNAL MEDICINE

## 2021-01-01 PROCEDURE — 99220 PR INITIAL OBSERVATION CARE,LEVL III: CPT | Mod: ,,, | Performed by: INTERNAL MEDICINE

## 2021-01-01 PROCEDURE — 83880 ASSAY OF NATRIURETIC PEPTIDE: CPT | Performed by: INTERNAL MEDICINE

## 2021-01-01 PROCEDURE — 99999 PR PBB SHADOW E&M-EST. PATIENT-LVL IV: CPT | Mod: PBBFAC,,, | Performed by: COLON & RECTAL SURGERY

## 2021-01-01 PROCEDURE — 3078F PR MOST RECENT DIASTOLIC BLOOD PRESSURE < 80 MM HG: ICD-10-PCS | Mod: HCNC,CPTII,S$GLB, | Performed by: COLON & RECTAL SURGERY

## 2021-01-01 PROCEDURE — 81000 URINALYSIS NONAUTO W/SCOPE: CPT | Performed by: EMERGENCY MEDICINE

## 2021-01-01 PROCEDURE — 85025 COMPLETE CBC W/AUTO DIFF WBC: CPT | Performed by: EMERGENCY MEDICINE

## 2021-01-01 PROCEDURE — 1126F AMNT PAIN NOTED NONE PRSNT: CPT | Mod: CPTII,S$GLB,, | Performed by: FAMILY MEDICINE

## 2021-01-01 PROCEDURE — 99499 RISK ADDL DX/OHS AUDIT: ICD-10-PCS | Mod: S$GLB,,, | Performed by: FAMILY MEDICINE

## 2021-01-01 PROCEDURE — 36415 COLL VENOUS BLD VENIPUNCTURE: CPT | Mod: PO | Performed by: INTERNAL MEDICINE

## 2021-01-01 PROCEDURE — 96374 THER/PROPH/DIAG INJ IV PUSH: CPT

## 2021-01-01 PROCEDURE — 71250 CT THORAX DX C-: CPT | Mod: TC

## 2021-01-01 PROCEDURE — 1159F PR MEDICATION LIST DOCUMENTED IN MEDICAL RECORD: ICD-10-PCS | Mod: S$GLB,,, | Performed by: FAMILY MEDICINE

## 2021-01-01 PROCEDURE — 1159F PR MEDICATION LIST DOCUMENTED IN MEDICAL RECORD: ICD-10-PCS | Mod: HCNC,CPTII,S$GLB, | Performed by: UROLOGY

## 2021-01-01 PROCEDURE — 1159F MED LIST DOCD IN RCRD: CPT | Mod: HCNC,CPTII,S$GLB, | Performed by: COLON & RECTAL SURGERY

## 2021-01-01 PROCEDURE — 63600175 PHARM REV CODE 636 W HCPCS: Performed by: NURSE PRACTITIONER

## 2021-01-01 PROCEDURE — 99214 PR OFFICE/OUTPT VISIT, EST, LEVL IV, 30-39 MIN: ICD-10-PCS | Mod: HCNC,S$GLB,, | Performed by: INTERNAL MEDICINE

## 2021-01-01 PROCEDURE — 80053 COMPREHEN METABOLIC PANEL: CPT | Mod: HCNC | Performed by: INTERNAL MEDICINE

## 2021-01-01 PROCEDURE — G0008 FLU VACCINE - QUADRIVALENT - ADJUVANTED: ICD-10-PCS | Mod: HCNC,S$GLB,, | Performed by: FAMILY MEDICINE

## 2021-01-01 PROCEDURE — 90694 FLU VACCINE - QUADRIVALENT - ADJUVANTED: ICD-10-PCS | Mod: HCNC,S$GLB,, | Performed by: FAMILY MEDICINE

## 2021-01-01 PROCEDURE — 99220 PR INITIAL OBSERVATION CARE,LEVL III: ICD-10-PCS | Mod: 25,,, | Performed by: INTERNAL MEDICINE

## 2021-01-01 PROCEDURE — 86140 C-REACTIVE PROTEIN: CPT

## 2021-01-01 PROCEDURE — 3008F PR BODY MASS INDEX (BMI) DOCUMENTED: ICD-10-PCS | Mod: HCNC,CPTII,S$GLB, | Performed by: COLON & RECTAL SURGERY

## 2021-01-01 PROCEDURE — 1101F PT FALLS ASSESS-DOCD LE1/YR: CPT | Mod: CPTII,S$GLB,, | Performed by: INTERNAL MEDICINE

## 2021-01-01 PROCEDURE — 1125F PR PAIN SEVERITY QUANTIFIED, PAIN PRESENT: ICD-10-PCS | Mod: S$GLB,,, | Performed by: FAMILY MEDICINE

## 2021-01-01 PROCEDURE — 3078F PR MOST RECENT DIASTOLIC BLOOD PRESSURE < 80 MM HG: ICD-10-PCS | Mod: HCNC,CPTII,S$GLB, | Performed by: UROLOGY

## 2021-01-01 PROCEDURE — 36415 COLL VENOUS BLD VENIPUNCTURE: CPT | Mod: HCNC,PO | Performed by: INTERNAL MEDICINE

## 2021-01-01 PROCEDURE — 99499 UNLISTED E&M SERVICE: CPT | Mod: S$GLB,,, | Performed by: FAMILY MEDICINE

## 2021-01-01 PROCEDURE — 99213 PR OFFICE/OUTPT VISIT, EST, LEVL III, 20-29 MIN: ICD-10-PCS | Mod: HCNC,S$GLB,, | Performed by: UROLOGY

## 2021-01-01 PROCEDURE — 99999 PR PBB SHADOW E&M-EST. PATIENT-LVL IV: CPT | Mod: PBBFAC,HCNC,, | Performed by: UROLOGY

## 2021-01-01 PROCEDURE — 1159F MED LIST DOCD IN RCRD: CPT | Mod: HCNC,CPTII,S$GLB, | Performed by: INTERNAL MEDICINE

## 2021-01-01 PROCEDURE — 94760 N-INVAS EAR/PLS OXIMETRY 1: CPT

## 2021-01-01 PROCEDURE — A9698 NON-RAD CONTRAST MATERIALNOC: HCPCS | Mod: HCNC | Performed by: INTERNAL MEDICINE

## 2021-01-01 PROCEDURE — 3078F DIAST BP <80 MM HG: CPT | Mod: HCNC,CPTII,S$GLB, | Performed by: INTERNAL MEDICINE

## 2021-01-01 PROCEDURE — 1101F PR PT FALLS ASSESS DOC 0-1 FALLS W/OUT INJ PAST YR: ICD-10-PCS | Mod: CPTII,S$GLB,, | Performed by: FAMILY MEDICINE

## 2021-01-01 PROCEDURE — 83880 ASSAY OF NATRIURETIC PEPTIDE: CPT | Mod: HCNC | Performed by: INTERNAL MEDICINE

## 2021-01-01 PROCEDURE — 1160F PR REVIEW ALL MEDS BY PRESCRIBER/CLIN PHARMACIST DOCUMENTED: ICD-10-PCS | Mod: HCNC,CPTII,S$GLB, | Performed by: COLON & RECTAL SURGERY

## 2021-01-01 PROCEDURE — 83690 ASSAY OF LIPASE: CPT | Performed by: EMERGENCY MEDICINE

## 2021-01-01 PROCEDURE — 1126F PR PAIN SEVERITY QUANTIFIED, NO PAIN PRESENT: ICD-10-PCS | Mod: HCNC,CPTII,S$GLB, | Performed by: UROLOGY

## 2021-01-01 PROCEDURE — 87070 CULTURE OTHR SPECIMN AEROBIC: CPT | Performed by: PODIATRIST

## 2021-01-01 PROCEDURE — 52310 PR CYSTOSCOPY,REMV CALCULUS,SIMPLE: ICD-10-PCS | Mod: HCNC,S$GLB,, | Performed by: UROLOGY

## 2021-01-01 PROCEDURE — 99999 PR PBB SHADOW E&M-EST. PATIENT-LVL IV: ICD-10-PCS | Mod: PBBFAC,HCNC,, | Performed by: COLON & RECTAL SURGERY

## 2021-01-01 PROCEDURE — 1159F PR MEDICATION LIST DOCUMENTED IN MEDICAL RECORD: ICD-10-PCS | Mod: CPTII,S$GLB,, | Performed by: COLON & RECTAL SURGERY

## 2021-01-01 PROCEDURE — 99499 UNLISTED E&M SERVICE: CPT | Mod: S$GLB,,, | Performed by: INTERNAL MEDICINE

## 2021-01-01 PROCEDURE — 99499 RISK ADDL DX/OHS AUDIT: ICD-10-PCS | Mod: S$GLB,,, | Performed by: PODIATRIST

## 2021-01-01 PROCEDURE — 1159F MED LIST DOCD IN RCRD: CPT | Mod: CPTII,S$GLB,, | Performed by: COLON & RECTAL SURGERY

## 2021-01-01 PROCEDURE — 3077F SYST BP >= 140 MM HG: CPT | Mod: CPTII,S$GLB,, | Performed by: INTERNAL MEDICINE

## 2021-01-01 PROCEDURE — 99499 RISK ADDL DX/OHS AUDIT: ICD-10-PCS | Mod: HCNC,S$GLB,, | Performed by: PODIATRIST

## 2021-01-01 PROCEDURE — 83880 ASSAY OF NATRIURETIC PEPTIDE: CPT | Performed by: EMERGENCY MEDICINE

## 2021-01-01 PROCEDURE — 1126F PR PAIN SEVERITY QUANTIFIED, NO PAIN PRESENT: ICD-10-PCS | Mod: CPTII,S$GLB,, | Performed by: COLON & RECTAL SURGERY

## 2021-01-01 PROCEDURE — 3078F PR MOST RECENT DIASTOLIC BLOOD PRESSURE < 80 MM HG: ICD-10-PCS | Mod: HCNC,CPTII,S$GLB, | Performed by: INTERNAL MEDICINE

## 2021-01-01 PROCEDURE — 99499 RISK ADDL DX/OHS AUDIT: ICD-10-PCS | Mod: HCNC,S$GLB,, | Performed by: COLON & RECTAL SURGERY

## 2021-01-01 PROCEDURE — 11042 DBRDMT SUBQ TIS 1ST 20SQCM/<: CPT | Mod: S$GLB,,, | Performed by: PODIATRIST

## 2021-01-01 PROCEDURE — 3078F DIAST BP <80 MM HG: CPT | Mod: HCNC,CPTII,S$GLB, | Performed by: COLON & RECTAL SURGERY

## 2021-01-01 PROCEDURE — 36415 COLL VENOUS BLD VENIPUNCTURE: CPT | Performed by: PODIATRIST

## 2021-01-01 PROCEDURE — 99999 PR PBB SHADOW E&M-EST. PATIENT-LVL V: CPT | Mod: PBBFAC,,, | Performed by: FAMILY MEDICINE

## 2021-01-01 PROCEDURE — 99223 1ST HOSP IP/OBS HIGH 75: CPT | Mod: ,,, | Performed by: FAMILY MEDICINE

## 2021-01-01 PROCEDURE — 85651 RBC SED RATE NONAUTOMATED: CPT | Performed by: PODIATRIST

## 2021-01-01 PROCEDURE — 71250 CT THORAX DX C-: CPT | Mod: TC,HCNC

## 2021-01-01 PROCEDURE — 36415 COLL VENOUS BLD VENIPUNCTURE: CPT | Mod: HCNC | Performed by: COLON & RECTAL SURGERY

## 2021-01-01 PROCEDURE — 3288F FALL RISK ASSESSMENT DOCD: CPT | Mod: HCNC,CPTII,S$GLB, | Performed by: UROLOGY

## 2021-01-01 PROCEDURE — 3075F SYST BP GE 130 - 139MM HG: CPT | Mod: CPTII,S$GLB,, | Performed by: FAMILY MEDICINE

## 2021-01-01 PROCEDURE — 3078F PR MOST RECENT DIASTOLIC BLOOD PRESSURE < 80 MM HG: ICD-10-PCS | Mod: CPTII,S$GLB,, | Performed by: COLON & RECTAL SURGERY

## 2021-01-01 PROCEDURE — 3077F PR MOST RECENT SYSTOLIC BLOOD PRESSURE >= 140 MM HG: ICD-10-PCS | Mod: CPTII,S$GLB,, | Performed by: INTERNAL MEDICINE

## 2021-01-01 PROCEDURE — 3077F SYST BP >= 140 MM HG: CPT | Mod: HCNC,CPTII,S$GLB, | Performed by: INTERNAL MEDICINE

## 2021-01-01 PROCEDURE — 3077F PR MOST RECENT SYSTOLIC BLOOD PRESSURE >= 140 MM HG: ICD-10-PCS | Mod: HCNC,CPTII,S$GLB, | Performed by: UROLOGY

## 2021-01-01 PROCEDURE — 3074F PR MOST RECENT SYSTOLIC BLOOD PRESSURE < 130 MM HG: ICD-10-PCS | Mod: CPTII,S$GLB,, | Performed by: COLON & RECTAL SURGERY

## 2021-01-01 PROCEDURE — 87086 URINE CULTURE/COLONY COUNT: CPT | Performed by: EMERGENCY MEDICINE

## 2021-01-01 PROCEDURE — 99220 PR INITIAL OBSERVATION CARE,LEVL III: CPT | Mod: 25,,, | Performed by: INTERNAL MEDICINE

## 2021-01-01 PROCEDURE — 3008F BODY MASS INDEX DOCD: CPT | Mod: CPTII,S$GLB,, | Performed by: COLON & RECTAL SURGERY

## 2021-01-01 PROCEDURE — 11042 PR DEBRIDEMENT, SKIN, SUB-Q TISSUE,=<20 SQ CM: ICD-10-PCS | Mod: S$GLB,,, | Performed by: PODIATRIST

## 2021-01-01 PROCEDURE — 99499 UNLISTED E&M SERVICE: CPT | Mod: HCNC,S$GLB,, | Performed by: FAMILY MEDICINE

## 2021-01-01 PROCEDURE — 1159F PR MEDICATION LIST DOCUMENTED IN MEDICAL RECORD: ICD-10-PCS | Mod: HCNC,CPTII,S$GLB, | Performed by: INTERNAL MEDICINE

## 2021-01-01 PROCEDURE — 25000242 PHARM REV CODE 250 ALT 637 W/ HCPCS: Performed by: FAMILY MEDICINE

## 2021-01-01 PROCEDURE — 99214 PR OFFICE/OUTPT VISIT, EST, LEVL IV, 30-39 MIN: ICD-10-PCS | Mod: HCNC,S$GLB,, | Performed by: COLON & RECTAL SURGERY

## 2021-01-01 PROCEDURE — 3008F BODY MASS INDEX DOCD: CPT | Mod: CPTII,S$GLB,, | Performed by: FAMILY MEDICINE

## 2021-01-01 PROCEDURE — U0002: ICD-10-PCS | Mod: QW,HCNC,S$GLB, | Performed by: FAMILY MEDICINE

## 2021-01-01 PROCEDURE — 25500020 PHARM REV CODE 255: Mod: HCNC | Performed by: INTERNAL MEDICINE

## 2021-01-01 PROCEDURE — M0243 CASIRIVI AND IMDEVI INFUSION: HCPCS | Performed by: INTERNAL MEDICINE

## 2021-01-01 PROCEDURE — 73630 XR FOOT COMPLETE 3 VIEW LEFT: ICD-10-PCS | Mod: 26,LT,, | Performed by: RADIOLOGY

## 2021-01-01 PROCEDURE — 1159F PR MEDICATION LIST DOCUMENTED IN MEDICAL RECORD: ICD-10-PCS | Mod: CPTII,S$GLB,, | Performed by: INTERNAL MEDICINE

## 2021-01-01 PROCEDURE — 25000003 PHARM REV CODE 250: Mod: HCNC | Performed by: INTERNAL MEDICINE

## 2021-01-01 PROCEDURE — 3074F SYST BP LT 130 MM HG: CPT | Mod: CPTII,S$GLB,, | Performed by: INTERNAL MEDICINE

## 2021-01-01 PROCEDURE — 83690 ASSAY OF LIPASE: CPT

## 2021-01-01 PROCEDURE — 96360 HYDRATION IV INFUSION INIT: CPT | Mod: 59

## 2021-01-01 PROCEDURE — 1159F PR MEDICATION LIST DOCUMENTED IN MEDICAL RECORD: ICD-10-PCS | Mod: HCNC,CPTII,S$GLB, | Performed by: COLON & RECTAL SURGERY

## 2021-01-01 PROCEDURE — 1126F AMNT PAIN NOTED NONE PRSNT: CPT | Mod: HCNC,CPTII,S$GLB, | Performed by: UROLOGY

## 2021-01-01 PROCEDURE — 99214 OFFICE O/P EST MOD 30 MIN: CPT | Mod: HCNC,S$GLB,, | Performed by: COLON & RECTAL SURGERY

## 2021-01-01 PROCEDURE — 1159F MED LIST DOCD IN RCRD: CPT | Mod: S$GLB,,, | Performed by: FAMILY MEDICINE

## 2021-01-01 PROCEDURE — 52310 CYSTOSCOPY AND TREATMENT: CPT | Mod: HCNC,S$GLB,, | Performed by: UROLOGY

## 2021-01-01 PROCEDURE — 99499 RISK ADDL DX/OHS AUDIT: ICD-10-PCS | Mod: HCNC,S$GLB,, | Performed by: INTERNAL MEDICINE

## 2021-01-01 PROCEDURE — 36415 COLL VENOUS BLD VENIPUNCTURE: CPT | Mod: PO

## 2021-01-01 PROCEDURE — 1125F AMNT PAIN NOTED PAIN PRSNT: CPT | Mod: S$GLB,,, | Performed by: PODIATRIST

## 2021-01-01 PROCEDURE — 1126F PR PAIN SEVERITY QUANTIFIED, NO PAIN PRESENT: ICD-10-PCS | Mod: HCNC,CPTII,S$GLB, | Performed by: INTERNAL MEDICINE

## 2021-01-01 PROCEDURE — 80053 COMPREHEN METABOLIC PANEL: CPT | Performed by: INTERNAL MEDICINE

## 2021-01-01 PROCEDURE — 3288F PR FALLS RISK ASSESSMENT DOCUMENTED: ICD-10-PCS | Mod: HCNC,CPTII,S$GLB, | Performed by: UROLOGY

## 2021-01-01 PROCEDURE — 99999 PR PBB SHADOW E&M-EST. PATIENT-LVL III: CPT | Mod: PBBFAC,,, | Performed by: FAMILY MEDICINE

## 2021-01-01 PROCEDURE — 3078F DIAST BP <80 MM HG: CPT | Mod: HCNC,CPTII,S$GLB, | Performed by: UROLOGY

## 2021-01-01 PROCEDURE — 99999 PR PBB SHADOW E&M-EST. PATIENT-LVL III: ICD-10-PCS | Mod: PBBFAC,,, | Performed by: FAMILY MEDICINE

## 2021-01-01 PROCEDURE — 73630 X-RAY EXAM OF FOOT: CPT | Mod: 26,LT,, | Performed by: RADIOLOGY

## 2021-01-01 PROCEDURE — 3074F SYST BP LT 130 MM HG: CPT | Mod: CPTII,S$GLB,, | Performed by: COLON & RECTAL SURGERY

## 2021-01-01 PROCEDURE — 3008F BODY MASS INDEX DOCD: CPT | Mod: HCNC,CPTII,S$GLB, | Performed by: UROLOGY

## 2021-01-01 PROCEDURE — 1159F MED LIST DOCD IN RCRD: CPT | Mod: CPTII,S$GLB,, | Performed by: FAMILY MEDICINE

## 2021-01-01 PROCEDURE — 86803 HEPATITIS C AB TEST: CPT

## 2021-01-01 PROCEDURE — 73630 X-RAY EXAM OF FOOT: CPT | Mod: TC,LT

## 2021-01-01 PROCEDURE — 1126F AMNT PAIN NOTED NONE PRSNT: CPT | Mod: S$GLB,,, | Performed by: PODIATRIST

## 2021-01-01 PROCEDURE — 99900035 HC TECH TIME PER 15 MIN (STAT)

## 2021-01-01 PROCEDURE — 3074F SYST BP LT 130 MM HG: CPT | Mod: CPTII,S$GLB,, | Performed by: FAMILY MEDICINE

## 2021-01-01 PROCEDURE — 3074F PR MOST RECENT SYSTOLIC BLOOD PRESSURE < 130 MM HG: ICD-10-PCS | Mod: CPTII,S$GLB,, | Performed by: FAMILY MEDICINE

## 2021-01-01 PROCEDURE — 99213 PR OFFICE/OUTPT VISIT, EST, LEVL III, 20-29 MIN: ICD-10-PCS | Mod: S$GLB,,, | Performed by: PODIATRIST

## 2021-01-01 PROCEDURE — 25000003 PHARM REV CODE 250: Performed by: INTERNAL MEDICINE

## 2021-01-01 PROCEDURE — 99223 PR INITIAL HOSPITAL CARE,LEVL III: ICD-10-PCS | Mod: ,,, | Performed by: FAMILY MEDICINE

## 2021-01-01 PROCEDURE — 3077F PR MOST RECENT SYSTOLIC BLOOD PRESSURE >= 140 MM HG: ICD-10-PCS | Mod: HCNC,CPTII,S$GLB, | Performed by: INTERNAL MEDICINE

## 2021-01-01 PROCEDURE — 1126F PR PAIN SEVERITY QUANTIFIED, NO PAIN PRESENT: ICD-10-PCS | Mod: HCNC,CPTII,S$GLB, | Performed by: COLON & RECTAL SURGERY

## 2021-01-01 RX ORDER — FUROSEMIDE 20 MG/1
20 TABLET ORAL DAILY PRN
Qty: 60 TABLET | Refills: 4 | Status: ON HOLD | OUTPATIENT
Start: 2021-01-01 | End: 2021-01-01 | Stop reason: SDUPTHER

## 2021-01-01 RX ORDER — CLOPIDOGREL BISULFATE 75 MG/1
75 TABLET ORAL DAILY
Status: DISCONTINUED | OUTPATIENT
Start: 2021-01-01 | End: 2021-01-01 | Stop reason: HOSPADM

## 2021-01-01 RX ORDER — FUROSEMIDE 10 MG/ML
40 INJECTION INTRAMUSCULAR; INTRAVENOUS ONCE
Status: COMPLETED | OUTPATIENT
Start: 2021-01-01 | End: 2021-01-01

## 2021-01-01 RX ORDER — ONDANSETRON 4 MG/1
4 TABLET, ORALLY DISINTEGRATING ORAL ONCE AS NEEDED
Status: ACTIVE | OUTPATIENT
Start: 2021-01-01 | End: 2033-05-22

## 2021-01-01 RX ORDER — HYDRALAZINE HYDROCHLORIDE 50 MG/1
50 TABLET, FILM COATED ORAL EVERY 12 HOURS
Qty: 180 TABLET | Refills: 4 | Status: SHIPPED | OUTPATIENT
Start: 2021-01-01 | End: 2022-02-24

## 2021-01-01 RX ORDER — CEFUROXIME AXETIL 500 MG/1
500 TABLET ORAL 2 TIMES DAILY
Qty: 20 TABLET | Refills: 0 | Status: SHIPPED | OUTPATIENT
Start: 2021-01-01 | End: 2021-01-01

## 2021-01-01 RX ORDER — VERAPAMIL HYDROCHLORIDE 40 MG/1
40 TABLET ORAL 3 TIMES DAILY
Status: DISCONTINUED | OUTPATIENT
Start: 2021-01-01 | End: 2021-01-01

## 2021-01-01 RX ORDER — ACETAMINOPHEN 325 MG/1
650 TABLET ORAL ONCE AS NEEDED
Status: ACTIVE | OUTPATIENT
Start: 2021-01-01 | End: 2033-05-22

## 2021-01-01 RX ORDER — RANOLAZINE 1000 MG/1
1000 TABLET, EXTENDED RELEASE ORAL 2 TIMES DAILY
Qty: 60 TABLET | Refills: 0 | Status: SHIPPED | OUTPATIENT
Start: 2021-01-01 | End: 2021-01-01

## 2021-01-01 RX ORDER — DIPHENHYDRAMINE HYDROCHLORIDE 50 MG/ML
25 INJECTION INTRAMUSCULAR; INTRAVENOUS ONCE AS NEEDED
Status: ACTIVE | OUTPATIENT
Start: 2021-01-01 | End: 2033-05-22

## 2021-01-01 RX ORDER — ACETAMINOPHEN 325 MG/1
650 TABLET ORAL EVERY 8 HOURS PRN
Status: DISCONTINUED | OUTPATIENT
Start: 2021-01-01 | End: 2021-01-01 | Stop reason: HOSPADM

## 2021-01-01 RX ORDER — POLYETHYLENE GLYCOL 3350 17 G/17G
17 POWDER, FOR SOLUTION ORAL DAILY
Qty: 100 EACH | Refills: 6 | Status: SHIPPED | OUTPATIENT
Start: 2021-01-01

## 2021-01-01 RX ORDER — DOXYCYCLINE HYCLATE 100 MG
100 TABLET ORAL EVERY 12 HOURS
Qty: 14 TABLET | Refills: 0 | Status: SHIPPED | OUTPATIENT
Start: 2021-01-01 | End: 2021-01-01

## 2021-01-01 RX ORDER — SODIUM CHLORIDE 0.9 % (FLUSH) 0.9 %
10 SYRINGE (ML) INJECTION
Status: DISCONTINUED | OUTPATIENT
Start: 2021-01-01 | End: 2021-01-01 | Stop reason: HOSPADM

## 2021-01-01 RX ORDER — HYDRALAZINE HYDROCHLORIDE 50 MG/1
50 TABLET, FILM COATED ORAL EVERY 12 HOURS
Status: DISCONTINUED | OUTPATIENT
Start: 2021-01-01 | End: 2021-01-01 | Stop reason: HOSPADM

## 2021-01-01 RX ORDER — RANOLAZINE 1000 MG/1
1000 TABLET, EXTENDED RELEASE ORAL 2 TIMES DAILY
Qty: 60 TABLET | Refills: 12 | OUTPATIENT
Start: 2021-01-01 | End: 2021-01-01 | Stop reason: SDUPTHER

## 2021-01-01 RX ORDER — DILTIAZEM HYDROCHLORIDE 180 MG/1
180 CAPSULE, COATED, EXTENDED RELEASE ORAL DAILY
Status: DISCONTINUED | OUTPATIENT
Start: 2021-01-01 | End: 2021-01-01

## 2021-01-01 RX ORDER — ASPIRIN 81 MG/1
81 TABLET ORAL DAILY
Status: DISCONTINUED | OUTPATIENT
Start: 2021-01-01 | End: 2021-01-01 | Stop reason: HOSPADM

## 2021-01-01 RX ORDER — AMLODIPINE BESYLATE 5 MG/1
10 TABLET ORAL DAILY
Status: DISCONTINUED | OUTPATIENT
Start: 2021-01-01 | End: 2021-01-01

## 2021-01-01 RX ORDER — RANOLAZINE 500 MG/1
500 TABLET, EXTENDED RELEASE ORAL DAILY
Status: DISCONTINUED | OUTPATIENT
Start: 2021-01-01 | End: 2021-01-01 | Stop reason: HOSPADM

## 2021-01-01 RX ORDER — NITROGLYCERIN 0.4 MG/1
0.4 TABLET SUBLINGUAL
Status: COMPLETED | OUTPATIENT
Start: 2021-01-01 | End: 2021-01-01

## 2021-01-01 RX ORDER — ISOSORBIDE MONONITRATE 60 MG/1
120 TABLET, EXTENDED RELEASE ORAL DAILY
Status: DISCONTINUED | OUTPATIENT
Start: 2021-01-01 | End: 2021-01-01 | Stop reason: HOSPADM

## 2021-01-01 RX ORDER — AMLODIPINE BESYLATE 5 MG/1
5 TABLET ORAL DAILY
Qty: 30 TABLET | Refills: 12 | Status: SHIPPED | OUTPATIENT
Start: 2021-01-01 | End: 2021-01-01

## 2021-01-01 RX ORDER — IPRATROPIUM BROMIDE AND ALBUTEROL SULFATE 2.5; .5 MG/3ML; MG/3ML
3 SOLUTION RESPIRATORY (INHALATION) EVERY 8 HOURS
Status: DISCONTINUED | OUTPATIENT
Start: 2021-01-01 | End: 2021-01-01

## 2021-01-01 RX ORDER — FAMOTIDINE 20 MG/1
20 TABLET, FILM COATED ORAL 2 TIMES DAILY
Qty: 60 TABLET | Refills: 0 | Status: SHIPPED | OUTPATIENT
Start: 2021-01-01 | End: 2022-01-01

## 2021-01-01 RX ORDER — AMLODIPINE BESYLATE 5 MG/1
5 TABLET ORAL DAILY
Status: DISCONTINUED | OUTPATIENT
Start: 2021-01-01 | End: 2021-01-01 | Stop reason: HOSPADM

## 2021-01-01 RX ORDER — ALBUTEROL SULFATE 90 UG/1
2 AEROSOL, METERED RESPIRATORY (INHALATION)
Status: ACTIVE | OUTPATIENT
Start: 2021-01-01

## 2021-01-01 RX ORDER — PANTOPRAZOLE SODIUM 40 MG/1
40 TABLET, DELAYED RELEASE ORAL DAILY
Refills: 3 | Status: DISCONTINUED | OUTPATIENT
Start: 2021-01-01 | End: 2021-01-01 | Stop reason: HOSPADM

## 2021-01-01 RX ORDER — RANOLAZINE 500 MG/1
500 TABLET, EXTENDED RELEASE ORAL DAILY
Status: DISCONTINUED | OUTPATIENT
Start: 2021-01-01 | End: 2021-01-01

## 2021-01-01 RX ORDER — ASPIRIN 325 MG
325 TABLET ORAL
Status: COMPLETED | OUTPATIENT
Start: 2021-01-01 | End: 2021-01-01

## 2021-01-01 RX ORDER — IPRATROPIUM BROMIDE AND ALBUTEROL SULFATE 2.5; .5 MG/3ML; MG/3ML
3 SOLUTION RESPIRATORY (INHALATION) EVERY 6 HOURS PRN
Status: DISCONTINUED | OUTPATIENT
Start: 2021-01-01 | End: 2021-01-01 | Stop reason: HOSPADM

## 2021-01-01 RX ORDER — LIDOCAINE HYDROCHLORIDE 20 MG/ML
JELLY TOPICAL
Status: COMPLETED | OUTPATIENT
Start: 2021-01-01 | End: 2021-01-01

## 2021-01-01 RX ORDER — HYDRALAZINE HYDROCHLORIDE 50 MG/1
50 TABLET, FILM COATED ORAL EVERY 12 HOURS
Status: DISCONTINUED | OUTPATIENT
Start: 2021-01-01 | End: 2021-01-01

## 2021-01-01 RX ORDER — FUROSEMIDE 20 MG/1
20 TABLET ORAL DAILY PRN
Status: DISCONTINUED | OUTPATIENT
Start: 2021-01-01 | End: 2021-01-01 | Stop reason: HOSPADM

## 2021-01-01 RX ORDER — FAMOTIDINE 10 MG/ML
20 INJECTION INTRAVENOUS
Status: COMPLETED | OUTPATIENT
Start: 2021-01-01 | End: 2021-01-01

## 2021-01-01 RX ORDER — ATORVASTATIN CALCIUM 40 MG/1
40 TABLET, FILM COATED ORAL DAILY
Status: DISCONTINUED | OUTPATIENT
Start: 2021-01-01 | End: 2021-01-01 | Stop reason: HOSPADM

## 2021-01-01 RX ORDER — CARVEDILOL 25 MG/1
25 TABLET ORAL 2 TIMES DAILY WITH MEALS
Qty: 180 TABLET | Refills: 1 | Status: SHIPPED | OUTPATIENT
Start: 2021-01-01

## 2021-01-01 RX ORDER — RANOLAZINE 500 MG/1
1000 TABLET, EXTENDED RELEASE ORAL 2 TIMES DAILY
Status: DISCONTINUED | OUTPATIENT
Start: 2021-01-01 | End: 2021-01-01 | Stop reason: HOSPADM

## 2021-01-01 RX ORDER — RANOLAZINE 500 MG/1
500 TABLET, EXTENDED RELEASE ORAL DAILY
Qty: 30 TABLET | Refills: 0 | Status: ON HOLD | OUTPATIENT
Start: 2021-01-01 | End: 2021-01-01 | Stop reason: HOSPADM

## 2021-01-01 RX ORDER — NITROGLYCERIN 0.4 MG/1
0.4 TABLET SUBLINGUAL EVERY 5 MIN PRN
Status: DISCONTINUED | OUTPATIENT
Start: 2021-01-01 | End: 2021-01-01 | Stop reason: HOSPADM

## 2021-01-01 RX ORDER — CARVEDILOL 12.5 MG/1
25 TABLET ORAL 2 TIMES DAILY WITH MEALS
Status: DISCONTINUED | OUTPATIENT
Start: 2021-01-01 | End: 2021-01-01 | Stop reason: HOSPADM

## 2021-01-01 RX ORDER — ASPIRIN 325 MG
325 TABLET ORAL
Status: ACTIVE | OUTPATIENT
Start: 2021-01-01 | End: 2021-01-01

## 2021-01-01 RX ORDER — FUROSEMIDE 20 MG/1
20 TABLET ORAL DAILY
Qty: 30 TABLET | Refills: 0 | Status: SHIPPED | OUTPATIENT
Start: 2021-01-01 | End: 2021-01-01

## 2021-01-01 RX ORDER — HYDRALAZINE HYDROCHLORIDE 25 MG/1
25 TABLET, FILM COATED ORAL EVERY 8 HOURS
Status: DISCONTINUED | OUTPATIENT
Start: 2021-01-01 | End: 2021-01-01

## 2021-01-01 RX ORDER — SODIUM CHLORIDE 0.9 % (FLUSH) 0.9 %
10 SYRINGE (ML) INJECTION
Status: ACTIVE | OUTPATIENT
Start: 2021-01-01

## 2021-01-01 RX ORDER — PANTOPRAZOLE SODIUM 40 MG/1
40 TABLET, DELAYED RELEASE ORAL DAILY
Status: DISCONTINUED | OUTPATIENT
Start: 2021-01-01 | End: 2021-01-01 | Stop reason: HOSPADM

## 2021-01-01 RX ORDER — HYDRALAZINE HYDROCHLORIDE 25 MG/1
25 TABLET, FILM COATED ORAL EVERY 12 HOURS
Status: DISCONTINUED | OUTPATIENT
Start: 2021-01-01 | End: 2021-01-01

## 2021-01-01 RX ORDER — TALC
6 POWDER (GRAM) TOPICAL NIGHTLY PRN
Status: DISCONTINUED | OUTPATIENT
Start: 2021-01-01 | End: 2021-01-01 | Stop reason: HOSPADM

## 2021-01-01 RX ORDER — RANOLAZINE 500 MG/1
500 TABLET, EXTENDED RELEASE ORAL 2 TIMES DAILY
Status: DISCONTINUED | OUTPATIENT
Start: 2021-01-01 | End: 2021-01-01

## 2021-01-01 RX ORDER — EPINEPHRINE 0.3 MG/.3ML
0.3 INJECTION SUBCUTANEOUS
Status: ACTIVE | OUTPATIENT
Start: 2021-01-01

## 2021-01-01 RX ORDER — ENEMA 19; 7 G/133ML; G/133ML
2 ENEMA RECTAL ONCE
Qty: 2 ENEMA | Refills: 0 | Status: SHIPPED | OUTPATIENT
Start: 2021-01-01 | End: 2021-01-01

## 2021-01-01 RX ORDER — RANOLAZINE 1000 MG/1
1000 TABLET, EXTENDED RELEASE ORAL 2 TIMES DAILY
Qty: 60 TABLET | Refills: 0 | Status: SHIPPED | OUTPATIENT
Start: 2021-01-01 | End: 2021-01-01 | Stop reason: SDUPTHER

## 2021-01-01 RX ORDER — AMLODIPINE BESYLATE 10 MG/1
10 TABLET ORAL DAILY
Status: DISCONTINUED | OUTPATIENT
Start: 2021-01-01 | End: 2021-01-01 | Stop reason: HOSPADM

## 2021-01-01 RX ORDER — RANOLAZINE 1000 MG/1
1000 TABLET, EXTENDED RELEASE ORAL 2 TIMES DAILY
Qty: 60 TABLET | Refills: 12 | Status: SHIPPED | OUTPATIENT
Start: 2021-01-01 | End: 2021-01-01

## 2021-01-01 RX ORDER — HYDRALAZINE HYDROCHLORIDE 25 MG/1
25 TABLET, FILM COATED ORAL EVERY 8 HOURS
Status: DISCONTINUED | OUTPATIENT
Start: 2021-01-01 | End: 2021-01-01 | Stop reason: HOSPADM

## 2021-01-01 RX ORDER — DOXYCYCLINE 100 MG/1
100 CAPSULE ORAL EVERY 12 HOURS
Qty: 14 CAPSULE | Refills: 0 | Status: SHIPPED | OUTPATIENT
Start: 2021-01-01 | End: 2021-01-01

## 2021-01-01 RX ORDER — AMLODIPINE BESYLATE 5 MG/1
5 TABLET ORAL DAILY
Qty: 30 TABLET | Refills: 0 | Status: SHIPPED | OUTPATIENT
Start: 2021-01-01 | End: 2021-01-01 | Stop reason: SDUPTHER

## 2021-01-01 RX ORDER — ASPIRIN 81 MG/1
81 TABLET ORAL DAILY
Status: DISCONTINUED | OUTPATIENT
Start: 2021-01-01 | End: 2021-01-01

## 2021-01-01 RX ADMIN — NITROGLYCERIN 0.4 MG: 0.4 TABLET, ORALLY DISINTEGRATING SUBLINGUAL at 02:02

## 2021-01-01 RX ADMIN — ASPIRIN 81 MG: 81 TABLET, COATED ORAL at 08:02

## 2021-01-01 RX ADMIN — PANTOPRAZOLE SODIUM 40 MG: 40 TABLET, DELAYED RELEASE ORAL at 09:02

## 2021-01-01 RX ADMIN — CLOPIDOGREL 75 MG: 75 TABLET, FILM COATED ORAL at 09:02

## 2021-01-01 RX ADMIN — ASPIRIN 81 MG: 81 TABLET, COATED ORAL at 09:02

## 2021-01-01 RX ADMIN — FAMOTIDINE 20 MG: 10 INJECTION INTRAVENOUS at 06:03

## 2021-01-01 RX ADMIN — ISOSORBIDE MONONITRATE 120 MG: 30 TABLET, EXTENDED RELEASE ORAL at 08:02

## 2021-01-01 RX ADMIN — FUROSEMIDE 40 MG: 10 INJECTION, SOLUTION INTRAMUSCULAR; INTRAVENOUS at 10:02

## 2021-01-01 RX ADMIN — PANTOPRAZOLE SODIUM 40 MG: 40 TABLET, DELAYED RELEASE ORAL at 08:02

## 2021-01-01 RX ADMIN — CARVEDILOL 25 MG: 12.5 TABLET, FILM COATED ORAL at 06:02

## 2021-01-01 RX ADMIN — RANOLAZINE 500 MG: 500 TABLET, EXTENDED RELEASE ORAL at 08:02

## 2021-01-01 RX ADMIN — NITROGLYCERIN 0.4 MG: 0.4 TABLET, ORALLY DISINTEGRATING SUBLINGUAL at 01:02

## 2021-01-01 RX ADMIN — CARVEDILOL 25 MG: 12.5 TABLET, FILM COATED ORAL at 09:02

## 2021-01-01 RX ADMIN — CLOPIDOGREL 75 MG: 75 TABLET, FILM COATED ORAL at 08:02

## 2021-01-01 RX ADMIN — ATORVASTATIN CALCIUM 40 MG: 40 TABLET, FILM COATED ORAL at 08:02

## 2021-01-01 RX ADMIN — IPRATROPIUM BROMIDE AND ALBUTEROL SULFATE 3 ML: .5; 3 SOLUTION RESPIRATORY (INHALATION) at 07:02

## 2021-01-01 RX ADMIN — AMLODIPINE BESYLATE 5 MG: 5 TABLET ORAL at 09:02

## 2021-01-01 RX ADMIN — ATORVASTATIN CALCIUM 40 MG: 40 TABLET, FILM COATED ORAL at 09:02

## 2021-01-01 RX ADMIN — ISOSORBIDE MONONITRATE 120 MG: 60 TABLET, EXTENDED RELEASE ORAL at 09:02

## 2021-01-01 RX ADMIN — CARVEDILOL 25 MG: 12.5 TABLET, FILM COATED ORAL at 07:02

## 2021-01-01 RX ADMIN — ISOSORBIDE MONONITRATE 120 MG: 30 TABLET, EXTENDED RELEASE ORAL at 09:02

## 2021-01-01 RX ADMIN — HYDRALAZINE HYDROCHLORIDE 25 MG: 25 TABLET, FILM COATED ORAL at 05:02

## 2021-01-01 RX ADMIN — HYDRALAZINE HYDROCHLORIDE 25 MG: 25 TABLET, FILM COATED ORAL at 09:02

## 2021-01-01 RX ADMIN — HYDRALAZINE HYDROCHLORIDE 25 MG: 25 TABLET, FILM COATED ORAL at 04:02

## 2021-01-01 RX ADMIN — RANOLAZINE 500 MG: 500 TABLET, EXTENDED RELEASE ORAL at 09:02

## 2021-01-01 RX ADMIN — FUROSEMIDE 40 MG: 10 INJECTION, SOLUTION INTRAMUSCULAR; INTRAVENOUS at 09:02

## 2021-01-01 RX ADMIN — HYDRALAZINE HYDROCHLORIDE 50 MG: 50 TABLET, FILM COATED ORAL at 09:02

## 2021-01-01 RX ADMIN — ASPIRIN 325 MG ORAL TABLET 325 MG: 325 PILL ORAL at 12:02

## 2021-01-01 RX ADMIN — CASIRIVIMAB AND IMDEVIMAB 600 MG: 600; 600 INJECTION, SOLUTION, CONCENTRATE INTRAVENOUS at 11:12

## 2021-01-01 RX ADMIN — SODIUM CHLORIDE 1000 ML: 0.9 INJECTION, SOLUTION INTRAVENOUS at 12:06

## 2021-01-01 RX ADMIN — AMLODIPINE BESYLATE 10 MG: 10 TABLET ORAL at 09:02

## 2021-01-01 RX ADMIN — AMLODIPINE BESYLATE 10 MG: 10 TABLET ORAL at 08:02

## 2021-01-01 RX ADMIN — HYDRALAZINE HYDROCHLORIDE 25 MG: 25 TABLET, FILM COATED ORAL at 08:02

## 2021-01-01 RX ADMIN — RANOLAZINE 500 MG: 500 TABLET, FILM COATED, EXTENDED RELEASE ORAL at 09:02

## 2021-01-01 RX ADMIN — IOHEXOL 1000 ML: 9 SOLUTION ORAL at 12:11

## 2021-01-01 RX ADMIN — ALUMINUM HYDROXIDE, MAGNESIUM HYDROXIDE, SIMETHICONE 50 ML: 400; 400; 40 SUSPENSION ORAL at 06:03

## 2021-01-01 RX ADMIN — IPRATROPIUM BROMIDE AND ALBUTEROL SULFATE 3 ML: .5; 3 SOLUTION RESPIRATORY (INHALATION) at 12:02

## 2021-01-01 RX ADMIN — LIDOCAINE HYDROCHLORIDE 10 ML: 20 JELLY TOPICAL at 01:10

## 2021-01-11 ENCOUNTER — LAB VISIT (OUTPATIENT)
Dept: LAB | Facility: HOSPITAL | Age: 71
End: 2021-01-11
Attending: INTERNAL MEDICINE
Payer: MEDICARE

## 2021-01-11 DIAGNOSIS — C18.9 COLON ADENOCARCINOMA: ICD-10-CM

## 2021-01-11 PROBLEM — Z09 FOLLOW UP: Status: RESOLVED | Noted: 2020-02-13 | Resolved: 2021-01-11

## 2021-01-11 LAB
BASOPHILS # BLD AUTO: 0.06 K/UL (ref 0–0.2)
BASOPHILS NFR BLD: 1.1 % (ref 0–1.9)
CEA SERPL-MCNC: 2.1 NG/ML (ref 0–5)
DIFFERENTIAL METHOD: ABNORMAL
EOSINOPHIL # BLD AUTO: 0.3 K/UL (ref 0–0.5)
EOSINOPHIL NFR BLD: 6.2 % (ref 0–8)
ERYTHROCYTE [DISTWIDTH] IN BLOOD BY AUTOMATED COUNT: 14.6 % (ref 11.5–14.5)
HCT VFR BLD AUTO: 34.6 % (ref 40–54)
HGB BLD-MCNC: 10.5 G/DL (ref 14–18)
IMM GRANULOCYTES # BLD AUTO: 0.02 K/UL (ref 0–0.04)
IMM GRANULOCYTES NFR BLD AUTO: 0.4 % (ref 0–0.5)
LYMPHOCYTES # BLD AUTO: 1 K/UL (ref 1–4.8)
LYMPHOCYTES NFR BLD: 18.5 % (ref 18–48)
MCH RBC QN AUTO: 27.9 PG (ref 27–31)
MCHC RBC AUTO-ENTMCNC: 30.3 G/DL (ref 32–36)
MCV RBC AUTO: 92 FL (ref 82–98)
MONOCYTES # BLD AUTO: 0.5 K/UL (ref 0.3–1)
MONOCYTES NFR BLD: 8.4 % (ref 4–15)
NEUTROPHILS # BLD AUTO: 3.6 K/UL (ref 1.8–7.7)
NEUTROPHILS NFR BLD: 65.4 % (ref 38–73)
NRBC BLD-RTO: 0 /100 WBC
PLATELET # BLD AUTO: 200 K/UL (ref 150–350)
PMV BLD AUTO: 9.7 FL (ref 9.2–12.9)
RBC # BLD AUTO: 3.77 M/UL (ref 4.6–6.2)
WBC # BLD AUTO: 5.45 K/UL (ref 3.9–12.7)

## 2021-01-11 PROCEDURE — 36415 COLL VENOUS BLD VENIPUNCTURE: CPT | Mod: HCNC,PO

## 2021-01-11 PROCEDURE — 80053 COMPREHEN METABOLIC PANEL: CPT | Mod: HCNC

## 2021-01-11 PROCEDURE — 85025 COMPLETE CBC W/AUTO DIFF WBC: CPT | Mod: HCNC

## 2021-01-11 PROCEDURE — 82378 CARCINOEMBRYONIC ANTIGEN: CPT | Mod: HCNC

## 2021-01-12 LAB
ALBUMIN SERPL BCP-MCNC: 3.8 G/DL (ref 3.5–5.2)
ALP SERPL-CCNC: 262 U/L (ref 55–135)
ALT SERPL W/O P-5'-P-CCNC: 15 U/L (ref 10–44)
ANION GAP SERPL CALC-SCNC: 11 MMOL/L (ref 8–16)
AST SERPL-CCNC: 19 U/L (ref 10–40)
BILIRUB SERPL-MCNC: 0.4 MG/DL (ref 0.1–1)
BUN SERPL-MCNC: 26 MG/DL (ref 8–23)
CALCIUM SERPL-MCNC: 8.9 MG/DL (ref 8.7–10.5)
CHLORIDE SERPL-SCNC: 109 MMOL/L (ref 95–110)
CO2 SERPL-SCNC: 16 MMOL/L (ref 23–29)
CREAT SERPL-MCNC: 2 MG/DL (ref 0.5–1.4)
EST. GFR  (AFRICAN AMERICAN): 37.7 ML/MIN/1.73 M^2
EST. GFR  (NON AFRICAN AMERICAN): 32.6 ML/MIN/1.73 M^2
GLUCOSE SERPL-MCNC: 82 MG/DL (ref 70–110)
POTASSIUM SERPL-SCNC: 5.3 MMOL/L (ref 3.5–5.1)
PROT SERPL-MCNC: 8.5 G/DL (ref 6–8.4)
SODIUM SERPL-SCNC: 136 MMOL/L (ref 136–145)

## 2021-01-14 ENCOUNTER — OFFICE VISIT (OUTPATIENT)
Dept: HEMATOLOGY/ONCOLOGY | Facility: CLINIC | Age: 71
End: 2021-01-14
Payer: MEDICARE

## 2021-01-14 DIAGNOSIS — C18.9 COLON ADENOCARCINOMA: Primary | ICD-10-CM

## 2021-01-14 PROCEDURE — 99024 POSTOP FOLLOW-UP VISIT: CPT | Mod: S$GLB,,, | Performed by: INTERNAL MEDICINE

## 2021-01-14 PROCEDURE — 99999 PR PBB SHADOW E&M-EST. PATIENT-LVL I: ICD-10-PCS | Mod: PBBFAC,,, | Performed by: INTERNAL MEDICINE

## 2021-01-14 PROCEDURE — 99999 PR PBB SHADOW E&M-EST. PATIENT-LVL I: CPT | Mod: PBBFAC,,, | Performed by: INTERNAL MEDICINE

## 2021-01-14 PROCEDURE — 99024 PR POST-OP FOLLOW-UP VISIT: ICD-10-PCS | Mod: S$GLB,,, | Performed by: INTERNAL MEDICINE

## 2021-01-19 ENCOUNTER — TELEPHONE (OUTPATIENT)
Dept: NEPHROLOGY | Facility: CLINIC | Age: 71
End: 2021-01-19

## 2021-01-26 RX ORDER — CLOPIDOGREL BISULFATE 75 MG/1
75 TABLET ORAL DAILY
Qty: 90 TABLET | Refills: 3 | Status: SHIPPED | OUTPATIENT
Start: 2021-01-26 | End: 2021-01-01

## 2021-02-04 ENCOUNTER — OFFICE VISIT (OUTPATIENT)
Dept: FAMILY MEDICINE | Facility: CLINIC | Age: 71
End: 2021-02-04
Payer: MEDICARE

## 2021-02-04 VITALS
TEMPERATURE: 99 F | BODY MASS INDEX: 23.53 KG/M2 | HEIGHT: 73 IN | OXYGEN SATURATION: 96 % | HEART RATE: 73 BPM | DIASTOLIC BLOOD PRESSURE: 78 MMHG | WEIGHT: 177.56 LBS | SYSTOLIC BLOOD PRESSURE: 136 MMHG

## 2021-02-04 DIAGNOSIS — D50.0 IRON DEFICIENCY ANEMIA DUE TO CHRONIC BLOOD LOSS: ICD-10-CM

## 2021-02-04 DIAGNOSIS — I10 ESSENTIAL HYPERTENSION: Chronic | ICD-10-CM

## 2021-02-04 DIAGNOSIS — Z89.511 STATUS POST BELOW KNEE AMPUTATION OF RIGHT LOWER EXTREMITY: Chronic | ICD-10-CM

## 2021-02-04 DIAGNOSIS — N18.4 CKD (CHRONIC KIDNEY DISEASE) STAGE 4, GFR 15-29 ML/MIN: Primary | ICD-10-CM

## 2021-02-04 PROBLEM — N18.9 ACUTE ON CHRONIC KIDNEY FAILURE: Status: RESOLVED | Noted: 2019-03-27 | Resolved: 2021-02-04

## 2021-02-04 PROBLEM — N17.9 ACUTE ON CHRONIC KIDNEY FAILURE: Status: RESOLVED | Noted: 2019-03-27 | Resolved: 2021-02-04

## 2021-02-04 PROCEDURE — 1159F PR MEDICATION LIST DOCUMENTED IN MEDICAL RECORD: ICD-10-PCS | Mod: S$GLB,,, | Performed by: FAMILY MEDICINE

## 2021-02-04 PROCEDURE — 3075F PR MOST RECENT SYSTOLIC BLOOD PRESS GE 130-139MM HG: ICD-10-PCS | Mod: CPTII,S$GLB,, | Performed by: FAMILY MEDICINE

## 2021-02-04 PROCEDURE — 3008F BODY MASS INDEX DOCD: CPT | Mod: CPTII,S$GLB,, | Performed by: FAMILY MEDICINE

## 2021-02-04 PROCEDURE — 3075F SYST BP GE 130 - 139MM HG: CPT | Mod: CPTII,S$GLB,, | Performed by: FAMILY MEDICINE

## 2021-02-04 PROCEDURE — 3288F FALL RISK ASSESSMENT DOCD: CPT | Mod: CPTII,S$GLB,, | Performed by: FAMILY MEDICINE

## 2021-02-04 PROCEDURE — 1126F PR PAIN SEVERITY QUANTIFIED, NO PAIN PRESENT: ICD-10-PCS | Mod: S$GLB,,, | Performed by: FAMILY MEDICINE

## 2021-02-04 PROCEDURE — 3078F PR MOST RECENT DIASTOLIC BLOOD PRESSURE < 80 MM HG: ICD-10-PCS | Mod: CPTII,S$GLB,, | Performed by: FAMILY MEDICINE

## 2021-02-04 PROCEDURE — 99214 PR OFFICE/OUTPT VISIT, EST, LEVL IV, 30-39 MIN: ICD-10-PCS | Mod: S$GLB,,, | Performed by: FAMILY MEDICINE

## 2021-02-04 PROCEDURE — 99999 PR PBB SHADOW E&M-EST. PATIENT-LVL IV: CPT | Mod: PBBFAC,,, | Performed by: FAMILY MEDICINE

## 2021-02-04 PROCEDURE — 3008F PR BODY MASS INDEX (BMI) DOCUMENTED: ICD-10-PCS | Mod: CPTII,S$GLB,, | Performed by: FAMILY MEDICINE

## 2021-02-04 PROCEDURE — 99499 UNLISTED E&M SERVICE: CPT | Mod: S$GLB,,, | Performed by: FAMILY MEDICINE

## 2021-02-04 PROCEDURE — 1101F PR PT FALLS ASSESS DOC 0-1 FALLS W/OUT INJ PAST YR: ICD-10-PCS | Mod: CPTII,S$GLB,, | Performed by: FAMILY MEDICINE

## 2021-02-04 PROCEDURE — 3078F DIAST BP <80 MM HG: CPT | Mod: CPTII,S$GLB,, | Performed by: FAMILY MEDICINE

## 2021-02-04 PROCEDURE — 99999 PR PBB SHADOW E&M-EST. PATIENT-LVL IV: ICD-10-PCS | Mod: PBBFAC,,, | Performed by: FAMILY MEDICINE

## 2021-02-04 PROCEDURE — 1159F MED LIST DOCD IN RCRD: CPT | Mod: S$GLB,,, | Performed by: FAMILY MEDICINE

## 2021-02-04 PROCEDURE — 3288F PR FALLS RISK ASSESSMENT DOCUMENTED: ICD-10-PCS | Mod: CPTII,S$GLB,, | Performed by: FAMILY MEDICINE

## 2021-02-04 PROCEDURE — 99499 RISK ADDL DX/OHS AUDIT: ICD-10-PCS | Mod: S$GLB,,, | Performed by: FAMILY MEDICINE

## 2021-02-04 PROCEDURE — 99214 OFFICE O/P EST MOD 30 MIN: CPT | Mod: S$GLB,,, | Performed by: FAMILY MEDICINE

## 2021-02-04 PROCEDURE — 1126F AMNT PAIN NOTED NONE PRSNT: CPT | Mod: S$GLB,,, | Performed by: FAMILY MEDICINE

## 2021-02-04 PROCEDURE — 1101F PT FALLS ASSESS-DOCD LE1/YR: CPT | Mod: CPTII,S$GLB,, | Performed by: FAMILY MEDICINE

## 2021-02-08 ENCOUNTER — OFFICE VISIT (OUTPATIENT)
Dept: SURGERY | Facility: CLINIC | Age: 71
End: 2021-02-08
Payer: MEDICARE

## 2021-02-08 VITALS
BODY MASS INDEX: 23.44 KG/M2 | WEIGHT: 177.69 LBS | DIASTOLIC BLOOD PRESSURE: 76 MMHG | TEMPERATURE: 98 F | HEART RATE: 71 BPM | SYSTOLIC BLOOD PRESSURE: 154 MMHG

## 2021-02-08 DIAGNOSIS — C18.9 COLON ADENOCARCINOMA: Primary | ICD-10-CM

## 2021-02-08 PROCEDURE — 1126F AMNT PAIN NOTED NONE PRSNT: CPT | Mod: S$GLB,,, | Performed by: COLON & RECTAL SURGERY

## 2021-02-08 PROCEDURE — 3288F PR FALLS RISK ASSESSMENT DOCUMENTED: ICD-10-PCS | Mod: CPTII,S$GLB,, | Performed by: COLON & RECTAL SURGERY

## 2021-02-08 PROCEDURE — 1159F MED LIST DOCD IN RCRD: CPT | Mod: S$GLB,,, | Performed by: COLON & RECTAL SURGERY

## 2021-02-08 PROCEDURE — 99999 PR PBB SHADOW E&M-EST. PATIENT-LVL IV: CPT | Mod: PBBFAC,,, | Performed by: COLON & RECTAL SURGERY

## 2021-02-08 PROCEDURE — 99214 PR OFFICE/OUTPT VISIT, EST, LEVL IV, 30-39 MIN: ICD-10-PCS | Mod: S$GLB,,, | Performed by: COLON & RECTAL SURGERY

## 2021-02-08 PROCEDURE — 99214 OFFICE O/P EST MOD 30 MIN: CPT | Mod: S$GLB,,, | Performed by: COLON & RECTAL SURGERY

## 2021-02-08 PROCEDURE — 99499 RISK ADDL DX/OHS AUDIT: ICD-10-PCS | Mod: S$GLB,,, | Performed by: COLON & RECTAL SURGERY

## 2021-02-08 PROCEDURE — 99499 UNLISTED E&M SERVICE: CPT | Mod: S$GLB,,, | Performed by: COLON & RECTAL SURGERY

## 2021-02-08 PROCEDURE — 3288F FALL RISK ASSESSMENT DOCD: CPT | Mod: CPTII,S$GLB,, | Performed by: COLON & RECTAL SURGERY

## 2021-02-08 PROCEDURE — 3077F SYST BP >= 140 MM HG: CPT | Mod: CPTII,S$GLB,, | Performed by: COLON & RECTAL SURGERY

## 2021-02-08 PROCEDURE — 1159F PR MEDICATION LIST DOCUMENTED IN MEDICAL RECORD: ICD-10-PCS | Mod: S$GLB,,, | Performed by: COLON & RECTAL SURGERY

## 2021-02-08 PROCEDURE — 3008F PR BODY MASS INDEX (BMI) DOCUMENTED: ICD-10-PCS | Mod: CPTII,S$GLB,, | Performed by: COLON & RECTAL SURGERY

## 2021-02-08 PROCEDURE — 3078F DIAST BP <80 MM HG: CPT | Mod: CPTII,S$GLB,, | Performed by: COLON & RECTAL SURGERY

## 2021-02-08 PROCEDURE — 1126F PR PAIN SEVERITY QUANTIFIED, NO PAIN PRESENT: ICD-10-PCS | Mod: S$GLB,,, | Performed by: COLON & RECTAL SURGERY

## 2021-02-08 PROCEDURE — 1101F PT FALLS ASSESS-DOCD LE1/YR: CPT | Mod: CPTII,S$GLB,, | Performed by: COLON & RECTAL SURGERY

## 2021-02-08 PROCEDURE — 3078F PR MOST RECENT DIASTOLIC BLOOD PRESSURE < 80 MM HG: ICD-10-PCS | Mod: CPTII,S$GLB,, | Performed by: COLON & RECTAL SURGERY

## 2021-02-08 PROCEDURE — 3077F PR MOST RECENT SYSTOLIC BLOOD PRESSURE >= 140 MM HG: ICD-10-PCS | Mod: CPTII,S$GLB,, | Performed by: COLON & RECTAL SURGERY

## 2021-02-08 PROCEDURE — 3008F BODY MASS INDEX DOCD: CPT | Mod: CPTII,S$GLB,, | Performed by: COLON & RECTAL SURGERY

## 2021-02-08 PROCEDURE — 1101F PR PT FALLS ASSESS DOC 0-1 FALLS W/OUT INJ PAST YR: ICD-10-PCS | Mod: CPTII,S$GLB,, | Performed by: COLON & RECTAL SURGERY

## 2021-02-08 PROCEDURE — 99999 PR PBB SHADOW E&M-EST. PATIENT-LVL IV: ICD-10-PCS | Mod: PBBFAC,,, | Performed by: COLON & RECTAL SURGERY

## 2021-02-08 RX ORDER — FUROSEMIDE 20 MG/1
TABLET ORAL
COMMUNITY
Start: 2020-12-02 | End: 2021-01-01 | Stop reason: SDUPTHER

## 2021-02-10 PROBLEM — R07.9 CHEST PAIN: Status: ACTIVE | Noted: 2021-01-01

## 2021-02-11 PROBLEM — R07.9 CHEST PAIN: Status: RESOLVED | Noted: 2021-01-01 | Resolved: 2021-01-01

## 2021-02-24 PROBLEM — I25.9 CHRONIC ISCHEMIC HEART DISEASE: Status: ACTIVE | Noted: 2021-01-01

## 2021-02-24 PROBLEM — I65.23 ASYMPTOMATIC BILATERAL CAROTID ARTERY STENOSIS: Status: ACTIVE | Noted: 2021-01-01

## 2021-02-24 PROBLEM — R94.39 ABNORMAL STRESS TEST: Status: ACTIVE | Noted: 2021-01-01

## 2021-02-24 PROBLEM — I50.32 CHRONIC DIASTOLIC HEART FAILURE: Status: ACTIVE | Noted: 2021-01-01

## 2021-02-24 PROBLEM — R94.31 ABNORMAL ECG: Status: ACTIVE | Noted: 2021-01-01

## 2021-02-24 PROBLEM — I20.9 AP (ANGINA PECTORIS): Status: ACTIVE | Noted: 2021-01-01

## 2021-02-24 PROBLEM — I25.10 CAD, MULTIPLE VESSEL: Status: ACTIVE | Noted: 2021-01-01

## 2021-03-09 PROBLEM — I25.118 CORONARY ARTERY DISEASE OF NATIVE ARTERY OF NATIVE HEART WITH STABLE ANGINA PECTORIS: Status: ACTIVE | Noted: 2021-01-01

## 2021-03-09 PROBLEM — J96.01 ACUTE RESPIRATORY FAILURE WITH HYPOXIA: Status: RESOLVED | Noted: 2020-01-14 | Resolved: 2021-01-01

## 2021-03-09 PROBLEM — I20.0 UNSTABLE ANGINA: Status: RESOLVED | Noted: 2019-03-06 | Resolved: 2021-01-01

## 2021-03-09 PROBLEM — I25.10 CORONARY ARTERY DISEASE INVOLVING NATIVE CORONARY ARTERY OF NATIVE HEART WITHOUT ANGINA PECTORIS: Status: RESOLVED | Noted: 2020-02-01 | Resolved: 2021-01-01

## 2021-04-21 PROBLEM — D64.9 ANEMIA: Status: ACTIVE | Noted: 2021-01-01

## 2021-04-27 NOTE — ASSESSMENT & PLAN NOTE
- Hydralazine IV prn  - Resume oral meds  - Monitor and adjust as needed     Outreach attempt was made to schedule an Annual Wellness Visit. This was the first attempt. Contact was made, AWV appointment scheduled.

## 2021-08-02 NOTE — TELEPHONE ENCOUNTER
After receiving a response from Dr Bauman I called Mrs Schaeffer back and advised her that Dr Bauman wants Mr Shepherd to keep his Consultation appt that is scheduled for this Wednesday so that he can go over the surgery as well as answer any questions they may have. Mrs Schaeffer stated that she understood and stated that they are all just a little nervous - I advised Mrs Schaeffer that she is welcome to call anytime she has any questions or concerns, that I would do my best to calm any fears to the best of my abilities - Mrs Schaeffer thanked me for my help.    Calm

## 2021-09-08 NOTE — CARE UPDATE
"Patient had a large bowel movement in bedpan at this time, wife at bedside.  Medium brown colored, several chunks of solid stool amongst very soft loose stool.  Patient stated "I feel better and I may even have to go again".  Will monitor progress of SBO symptoms.  Patient NG to LIWS continues with no additional output in cannister.  Patient enjoys ice chips in moderation.  " Assessment/Plan:    Colitis, acute  Sx resolved       Abnormal laboratory test  CBC abnl  Repeat in 1-2 months       Diagnoses and all orders for this visit:    Colitis, acute    Abnormal laboratory test  -     CBC and differential; Future    Other orders  -     Discontinue: metroNIDAZOLE (FLAGYL) 500 mg tablet  -     L-methylfolate Calcium 15 MG TABS  -     Cholecalciferol 125 MCG (5000 UT) capsule; Take 5,000 Units by mouth daily  -     carBAMazepine (CARBATROL) 200 mg 12 hr capsule; Take 200 mg by mouth 2 (two) times a day        Subjective:      Patient ID: Fabio Gómez is a 27 y o  male presents today with caregivers for TCM visit  Patient was [facility] to [facility] from 8/25-8/30/2021 with a diagnosis of acute colitis  He was started on Rocephin Flagyl, CT findings 1  Diffuse mild-moderate colonic wall thickening with scattered air-fluid levels throughout suggesting colitis  2   Several mildly prominent nonspecific mesenteric lymph nodes, particularly in the right lower quadrant which are presumably reactive, possibly related to colitis  3   Additional findings as noted  during hospital admission patient had 2 episodes blood in stool with positive Hemoccult  GI was consulted and recommended holding anti diarrheas, obtaining stool studies and performing an endoscopic if there was no symptomatic improvement or clinical deterioration  Stool cultures positive campylobacter  Antibiotics were discontinued at time of discharge and advised to follow-up with PCP  Patient and staff report no further abdominal pain, blood in stools, diarrhea, fever, or chills  Patient is doing well, no complaints voiced at this time        The following portions of the patient's history were reviewed and updated as appropriate: allergies, current medications, past family history, past medical history, past social history, past surgical history and problem list     Review of Systems   Constitutional: Negative for chills, fatigue and fever  Respiratory: Negative for cough and shortness of breath  Gastrointestinal: Negative for abdominal pain, blood in stool, constipation, diarrhea, nausea and vomiting  Psychiatric/Behavioral: Negative for agitation and behavioral problems  Objective:      /72 (BP Location: Left arm, Patient Position: Sitting, Cuff Size: Large)   Pulse (!) 114   Temp 98 6 °F (37 °C) (Temporal)   Resp 18   Ht 5' 9" (1 753 m)   Wt 101 kg (222 lb 9 6 oz)   SpO2 97%   BMI 32 87 kg/m²          Physical Exam  Constitutional:       General: He is not in acute distress  Appearance: Normal appearance  He is well-developed  HENT:      Head: Normocephalic and atraumatic  Eyes:      Conjunctiva/sclera: Conjunctivae normal    Cardiovascular:      Rate and Rhythm: Normal rate and regular rhythm  Heart sounds: Normal heart sounds  No murmur heard  Pulmonary:      Effort: Pulmonary effort is normal  No respiratory distress  Breath sounds: Normal breath sounds  No wheezing  Abdominal:      General: Abdomen is flat  Bowel sounds are normal  There is no distension  Palpations: Abdomen is soft  Musculoskeletal:         General: No deformity  Cervical back: Normal range of motion and neck supple  Neurological:      Mental Status: He is alert  Mental status is at baseline  Psychiatric:         Mood and Affect: Mood normal          Behavior: Behavior is cooperative

## 2022-01-01 ENCOUNTER — HOSPITAL ENCOUNTER (OUTPATIENT)
Facility: HOSPITAL | Age: 72
Discharge: HOME | End: 2022-02-07
Attending: EMERGENCY MEDICINE | Admitting: INTERNAL MEDICINE
Payer: MEDICARE

## 2022-01-01 VITALS
HEART RATE: 82 BPM | BODY MASS INDEX: 24.84 KG/M2 | TEMPERATURE: 98 F | RESPIRATION RATE: 16 BRPM | HEIGHT: 74 IN | SYSTOLIC BLOOD PRESSURE: 124 MMHG | DIASTOLIC BLOOD PRESSURE: 58 MMHG | OXYGEN SATURATION: 94 % | WEIGHT: 193.56 LBS

## 2022-01-01 DIAGNOSIS — N17.9 ACUTE RENAL FAILURE SUPERIMPOSED ON STAGE 3 CHRONIC KIDNEY DISEASE, UNSPECIFIED ACUTE RENAL FAILURE TYPE, UNSPECIFIED WHETHER STAGE 3A OR 3B CKD: Primary | ICD-10-CM

## 2022-01-01 DIAGNOSIS — E87.20 METABOLIC ACIDOSIS: ICD-10-CM

## 2022-01-01 DIAGNOSIS — R11.2 NAUSEA & VOMITING: ICD-10-CM

## 2022-01-01 DIAGNOSIS — D64.9 ACUTE ON CHRONIC ANEMIA: ICD-10-CM

## 2022-01-01 DIAGNOSIS — E86.1 INTRAVASCULAR VOLUME DEPLETION: ICD-10-CM

## 2022-01-01 DIAGNOSIS — R07.9 CHEST PAIN: ICD-10-CM

## 2022-01-01 DIAGNOSIS — N28.9 RENAL DYSFUNCTION: ICD-10-CM

## 2022-01-01 DIAGNOSIS — D50.8 IRON DEFICIENCY ANEMIA SECONDARY TO INADEQUATE DIETARY IRON INTAKE: ICD-10-CM

## 2022-01-01 DIAGNOSIS — R53.1 WEAKNESS: ICD-10-CM

## 2022-01-01 DIAGNOSIS — D50.0 IRON DEFICIENCY ANEMIA DUE TO CHRONIC BLOOD LOSS: ICD-10-CM

## 2022-01-01 DIAGNOSIS — N18.30 ACUTE RENAL FAILURE SUPERIMPOSED ON STAGE 3 CHRONIC KIDNEY DISEASE, UNSPECIFIED ACUTE RENAL FAILURE TYPE, UNSPECIFIED WHETHER STAGE 3A OR 3B CKD: Primary | ICD-10-CM

## 2022-01-01 LAB
ABO + RH BLD: NORMAL
ALBUMIN SERPL BCP-MCNC: 2.7 G/DL (ref 3.5–5.2)
ALBUMIN SERPL BCP-MCNC: 2.8 G/DL (ref 3.5–5.2)
ALP SERPL-CCNC: 134 U/L (ref 55–135)
ALP SERPL-CCNC: 137 U/L (ref 55–135)
ALT SERPL W/O P-5'-P-CCNC: 21 U/L (ref 10–44)
ALT SERPL W/O P-5'-P-CCNC: 22 U/L (ref 10–44)
ANION GAP SERPL CALC-SCNC: 11 MMOL/L (ref 8–16)
ANISOCYTOSIS BLD QL SMEAR: SLIGHT
AST SERPL-CCNC: 19 U/L (ref 10–40)
AST SERPL-CCNC: 44 U/L (ref 10–40)
BASOPHILS # BLD AUTO: 0.01 K/UL (ref 0–0.2)
BASOPHILS # BLD AUTO: 0.01 K/UL (ref 0–0.2)
BASOPHILS NFR BLD: 0.1 % (ref 0–1.9)
BASOPHILS NFR BLD: 0.2 % (ref 0–1.9)
BILIRUB SERPL-MCNC: 0.8 MG/DL (ref 0.1–1)
BILIRUB SERPL-MCNC: 0.9 MG/DL (ref 0.1–1)
BILIRUB UR QL STRIP: NEGATIVE
BLD GP AB SCN CELLS X3 SERPL QL: NORMAL
BUN SERPL-MCNC: 43 MG/DL (ref 8–23)
BUN SERPL-MCNC: 46 MG/DL (ref 8–23)
BUN SERPL-MCNC: 47 MG/DL (ref 8–23)
CALCIUM SERPL-MCNC: 7.6 MG/DL (ref 8.7–10.5)
CALCIUM SERPL-MCNC: 7.8 MG/DL (ref 8.7–10.5)
CALCIUM SERPL-MCNC: 8 MG/DL (ref 8.7–10.5)
CHLORIDE SERPL-SCNC: 103 MMOL/L (ref 95–110)
CHLORIDE SERPL-SCNC: 105 MMOL/L (ref 95–110)
CHLORIDE SERPL-SCNC: 105 MMOL/L (ref 95–110)
CLARITY UR: CLEAR
CO2 SERPL-SCNC: 14 MMOL/L (ref 23–29)
CO2 SERPL-SCNC: 16 MMOL/L (ref 23–29)
CO2 SERPL-SCNC: 16 MMOL/L (ref 23–29)
COLOR UR: YELLOW
CREAT SERPL-MCNC: 2.8 MG/DL (ref 0.5–1.4)
CREAT SERPL-MCNC: 3.4 MG/DL (ref 0.5–1.4)
CREAT SERPL-MCNC: 3.9 MG/DL (ref 0.5–1.4)
DIFFERENTIAL METHOD: ABNORMAL
DIFFERENTIAL METHOD: ABNORMAL
EOSINOPHIL # BLD AUTO: 0 K/UL (ref 0–0.5)
EOSINOPHIL # BLD AUTO: 0 K/UL (ref 0–0.5)
EOSINOPHIL NFR BLD: 0.1 % (ref 0–8)
EOSINOPHIL NFR BLD: 0.2 % (ref 0–8)
ERYTHROCYTE [DISTWIDTH] IN BLOOD BY AUTOMATED COUNT: 14.6 % (ref 11.5–14.5)
ERYTHROCYTE [DISTWIDTH] IN BLOOD BY AUTOMATED COUNT: 14.6 % (ref 11.5–14.5)
EST. GFR  (AFRICAN AMERICAN): 17 ML/MIN/1.73 M^2
EST. GFR  (AFRICAN AMERICAN): 20 ML/MIN/1.73 M^2
EST. GFR  (AFRICAN AMERICAN): 25 ML/MIN/1.73 M^2
EST. GFR  (NON AFRICAN AMERICAN): 14 ML/MIN/1.73 M^2
EST. GFR  (NON AFRICAN AMERICAN): 17 ML/MIN/1.73 M^2
EST. GFR  (NON AFRICAN AMERICAN): 21 ML/MIN/1.73 M^2
FERRITIN SERPL-MCNC: 2316 NG/ML (ref 20–300)
GLUCOSE SERPL-MCNC: 117 MG/DL (ref 70–110)
GLUCOSE SERPL-MCNC: 135 MG/DL (ref 70–110)
GLUCOSE SERPL-MCNC: 170 MG/DL (ref 70–110)
GLUCOSE UR QL STRIP: NEGATIVE
HCT VFR BLD AUTO: 25.7 % (ref 40–54)
HCT VFR BLD AUTO: 26.5 % (ref 40–54)
HGB BLD-MCNC: 8.5 G/DL (ref 14–18)
HGB BLD-MCNC: 8.6 G/DL (ref 14–18)
HGB UR QL STRIP: NEGATIVE
HYPOCHROMIA BLD QL SMEAR: ABNORMAL
IMM GRANULOCYTES # BLD AUTO: 0.08 K/UL (ref 0–0.04)
IMM GRANULOCYTES # BLD AUTO: 0.15 K/UL (ref 0–0.04)
IMM GRANULOCYTES NFR BLD AUTO: 1 % (ref 0–0.5)
IMM GRANULOCYTES NFR BLD AUTO: 2.4 % (ref 0–0.5)
IRON SERPL-MCNC: 24 UG/DL (ref 45–160)
KETONES UR QL STRIP: NEGATIVE
LACTATE SERPL-SCNC: 1 MMOL/L (ref 0.5–2.2)
LEUKOCYTE ESTERASE UR QL STRIP: NEGATIVE
LIPASE SERPL-CCNC: 15 U/L (ref 4–60)
LYMPHOCYTES # BLD AUTO: 0.3 K/UL (ref 1–4.8)
LYMPHOCYTES # BLD AUTO: 0.3 K/UL (ref 1–4.8)
LYMPHOCYTES NFR BLD: 3.4 % (ref 18–48)
LYMPHOCYTES NFR BLD: 4.6 % (ref 18–48)
MAGNESIUM SERPL-MCNC: 1.5 MG/DL (ref 1.6–2.6)
MCH RBC QN AUTO: 30 PG (ref 27–31)
MCH RBC QN AUTO: 31 PG (ref 27–31)
MCHC RBC AUTO-ENTMCNC: 32.1 G/DL (ref 32–36)
MCHC RBC AUTO-ENTMCNC: 33.5 G/DL (ref 32–36)
MCV RBC AUTO: 93 FL (ref 82–98)
MCV RBC AUTO: 94 FL (ref 82–98)
MONOCYTES # BLD AUTO: 0.4 K/UL (ref 0.3–1)
MONOCYTES # BLD AUTO: 0.7 K/UL (ref 0.3–1)
MONOCYTES NFR BLD: 6.9 % (ref 4–15)
MONOCYTES NFR BLD: 8.9 % (ref 4–15)
NEUTROPHILS # BLD AUTO: 5.4 K/UL (ref 1.8–7.7)
NEUTROPHILS # BLD AUTO: 6.6 K/UL (ref 1.8–7.7)
NEUTROPHILS NFR BLD: 85.7 % (ref 38–73)
NEUTROPHILS NFR BLD: 86.5 % (ref 38–73)
NITRITE UR QL STRIP: NEGATIVE
NRBC BLD-RTO: 0 /100 WBC
NRBC BLD-RTO: 0 /100 WBC
PH UR STRIP: 6 [PH] (ref 5–8)
PLATELET # BLD AUTO: 102 K/UL (ref 150–450)
PLATELET # BLD AUTO: 115 K/UL (ref 150–450)
PLATELET BLD QL SMEAR: ABNORMAL
PMV BLD AUTO: 10.3 FL (ref 9.2–12.9)
PMV BLD AUTO: 10.5 FL (ref 9.2–12.9)
POCT GLUCOSE: 128 MG/DL (ref 70–110)
POCT GLUCOSE: 132 MG/DL (ref 70–110)
POCT GLUCOSE: 154 MG/DL (ref 70–110)
POCT GLUCOSE: 156 MG/DL (ref 70–110)
POIKILOCYTOSIS BLD QL SMEAR: SLIGHT
POTASSIUM SERPL-SCNC: 3.5 MMOL/L (ref 3.5–5.1)
POTASSIUM SERPL-SCNC: 3.7 MMOL/L (ref 3.5–5.1)
POTASSIUM SERPL-SCNC: 4 MMOL/L (ref 3.5–5.1)
PROT SERPL-MCNC: 6.1 G/DL (ref 6–8.4)
PROT SERPL-MCNC: 6.8 G/DL (ref 6–8.4)
PROT UR QL STRIP: ABNORMAL
RBC # BLD AUTO: 2.77 M/UL (ref 4.6–6.2)
RBC # BLD AUTO: 2.83 M/UL (ref 4.6–6.2)
SATURATED IRON: 13 % (ref 20–50)
SODIUM SERPL-SCNC: 130 MMOL/L (ref 136–145)
SODIUM SERPL-SCNC: 130 MMOL/L (ref 136–145)
SODIUM SERPL-SCNC: 132 MMOL/L (ref 136–145)
SP GR UR STRIP: 1.01 (ref 1–1.03)
TOTAL IRON BINDING CAPACITY: 185 UG/DL (ref 250–450)
TRANSFERRIN SERPL-MCNC: 125 MG/DL (ref 200–375)
TRANSFERRIN SERPL-MCNC: 125 MG/DL (ref 200–375)
URN SPEC COLLECT METH UR: ABNORMAL
UROBILINOGEN UR STRIP-ACNC: NEGATIVE EU/DL
WBC # BLD AUTO: 6.26 K/UL (ref 3.9–12.7)
WBC # BLD AUTO: 7.64 K/UL (ref 3.9–12.7)

## 2022-01-01 PROCEDURE — 81003 URINALYSIS AUTO W/O SCOPE: CPT | Mod: HCNC | Performed by: EMERGENCY MEDICINE

## 2022-01-01 PROCEDURE — 96365 THER/PROPH/DIAG IV INF INIT: CPT

## 2022-01-01 PROCEDURE — 80053 COMPREHEN METABOLIC PANEL: CPT | Mod: HCNC | Performed by: NURSE PRACTITIONER

## 2022-01-01 PROCEDURE — G0378 HOSPITAL OBSERVATION PER HR: HCPCS | Mod: HCNC

## 2022-01-01 PROCEDURE — 86901 BLOOD TYPING SEROLOGIC RH(D): CPT | Mod: HCNC | Performed by: NURSE PRACTITIONER

## 2022-01-01 PROCEDURE — 96361 HYDRATE IV INFUSION ADD-ON: CPT

## 2022-01-01 PROCEDURE — 83690 ASSAY OF LIPASE: CPT | Mod: HCNC | Performed by: EMERGENCY MEDICINE

## 2022-01-01 PROCEDURE — 25000003 PHARM REV CODE 250: Mod: HCNC | Performed by: NURSE PRACTITIONER

## 2022-01-01 PROCEDURE — 93010 EKG 12-LEAD: ICD-10-PCS | Mod: HCNC,,, | Performed by: INTERNAL MEDICINE

## 2022-01-01 PROCEDURE — 82728 ASSAY OF FERRITIN: CPT | Mod: HCNC | Performed by: NURSE PRACTITIONER

## 2022-01-01 PROCEDURE — 85025 COMPLETE CBC W/AUTO DIFF WBC: CPT | Mod: HCNC | Performed by: EMERGENCY MEDICINE

## 2022-01-01 PROCEDURE — 36415 COLL VENOUS BLD VENIPUNCTURE: CPT | Mod: HCNC | Performed by: NURSE PRACTITIONER

## 2022-01-01 PROCEDURE — 63600175 PHARM REV CODE 636 W HCPCS: Mod: HCNC | Performed by: NURSE PRACTITIONER

## 2022-01-01 PROCEDURE — 84466 ASSAY OF TRANSFERRIN: CPT | Mod: HCNC | Performed by: NURSE PRACTITIONER

## 2022-01-01 PROCEDURE — A4216 STERILE WATER/SALINE, 10 ML: HCPCS | Mod: HCNC | Performed by: NURSE PRACTITIONER

## 2022-01-01 PROCEDURE — 99285 EMERGENCY DEPT VISIT HI MDM: CPT | Mod: 25,HCNC

## 2022-01-01 PROCEDURE — 83735 ASSAY OF MAGNESIUM: CPT | Mod: HCNC | Performed by: EMERGENCY MEDICINE

## 2022-01-01 PROCEDURE — 93010 ELECTROCARDIOGRAM REPORT: CPT | Mod: HCNC,,, | Performed by: INTERNAL MEDICINE

## 2022-01-01 PROCEDURE — 63600175 PHARM REV CODE 636 W HCPCS: Mod: HCNC | Performed by: EMERGENCY MEDICINE

## 2022-01-01 PROCEDURE — 96361 HYDRATE IV INFUSION ADD-ON: CPT | Mod: HCNC

## 2022-01-01 PROCEDURE — 83605 ASSAY OF LACTIC ACID: CPT | Mod: HCNC | Performed by: EMERGENCY MEDICINE

## 2022-01-01 PROCEDURE — 80048 BASIC METABOLIC PNL TOTAL CA: CPT | Mod: HCNC | Performed by: NURSE PRACTITIONER

## 2022-01-01 PROCEDURE — 93005 ELECTROCARDIOGRAM TRACING: CPT | Mod: HCNC

## 2022-01-01 PROCEDURE — 85025 COMPLETE CBC W/AUTO DIFF WBC: CPT | Mod: HCNC | Performed by: NURSE PRACTITIONER

## 2022-01-01 PROCEDURE — 80053 COMPREHEN METABOLIC PANEL: CPT | Mod: HCNC | Performed by: EMERGENCY MEDICINE

## 2022-01-01 RX ORDER — ASPIRIN 81 MG/1
81 TABLET ORAL DAILY
COMMUNITY

## 2022-01-01 RX ORDER — MAGNESIUM SULFATE 1 G/100ML
1 INJECTION INTRAVENOUS ONCE
Status: COMPLETED | OUTPATIENT
Start: 2022-01-01 | End: 2022-01-01

## 2022-01-01 RX ORDER — OXYCODONE AND ACETAMINOPHEN 5; 325 MG/1; MG/1
1 TABLET ORAL EVERY 6 HOURS PRN
Status: DISCONTINUED | OUTPATIENT
Start: 2022-01-01 | End: 2022-01-01 | Stop reason: HOSPADM

## 2022-01-01 RX ORDER — IBUPROFEN 200 MG
24 TABLET ORAL
Status: DISCONTINUED | OUTPATIENT
Start: 2022-01-01 | End: 2022-01-01 | Stop reason: HOSPADM

## 2022-01-01 RX ORDER — TRAMADOL HYDROCHLORIDE 50 MG/1
50 TABLET ORAL EVERY 12 HOURS PRN
Qty: 4 TABLET | Refills: 0 | Status: SHIPPED | OUTPATIENT
Start: 2022-01-01 | End: 2022-01-01

## 2022-01-01 RX ORDER — ACETAMINOPHEN 500 MG
1 TABLET ORAL DAILY
COMMUNITY

## 2022-01-01 RX ORDER — ONDANSETRON 2 MG/ML
4 INJECTION INTRAMUSCULAR; INTRAVENOUS EVERY 8 HOURS PRN
Status: DISCONTINUED | OUTPATIENT
Start: 2022-01-01 | End: 2022-01-01 | Stop reason: HOSPADM

## 2022-01-01 RX ORDER — SODIUM CHLORIDE 9 MG/ML
INJECTION, SOLUTION INTRAVENOUS CONTINUOUS
Status: DISCONTINUED | OUTPATIENT
Start: 2022-01-01 | End: 2022-01-01 | Stop reason: HOSPADM

## 2022-01-01 RX ORDER — SODIUM BICARBONATE 650 MG/1
650 TABLET ORAL 2 TIMES DAILY
Qty: 30 TABLET | Refills: 0 | Status: SHIPPED | OUTPATIENT
Start: 2022-01-01 | End: 2023-02-07

## 2022-01-01 RX ORDER — ASCORBIC ACID 500 MG
500 TABLET ORAL DAILY
COMMUNITY

## 2022-01-01 RX ORDER — SODIUM CHLORIDE 0.9 % (FLUSH) 0.9 %
10 SYRINGE (ML) INJECTION EVERY 8 HOURS
Status: DISCONTINUED | OUTPATIENT
Start: 2022-01-01 | End: 2022-01-01 | Stop reason: HOSPADM

## 2022-01-01 RX ORDER — ACETAMINOPHEN 325 MG/1
650 TABLET ORAL EVERY 4 HOURS PRN
Status: DISCONTINUED | OUTPATIENT
Start: 2022-01-01 | End: 2022-01-01 | Stop reason: HOSPADM

## 2022-01-01 RX ORDER — AMOXICILLIN 250 MG
1 CAPSULE ORAL 2 TIMES DAILY PRN
Status: DISCONTINUED | OUTPATIENT
Start: 2022-01-01 | End: 2022-01-01 | Stop reason: HOSPADM

## 2022-01-01 RX ORDER — GLUCAGON 1 MG
1 KIT INJECTION
Status: DISCONTINUED | OUTPATIENT
Start: 2022-01-01 | End: 2022-01-01 | Stop reason: HOSPADM

## 2022-01-01 RX ORDER — SODIUM BICARBONATE 650 MG/1
650 TABLET ORAL 3 TIMES DAILY
Status: DISCONTINUED | OUTPATIENT
Start: 2022-01-01 | End: 2022-01-01 | Stop reason: HOSPADM

## 2022-01-01 RX ORDER — TALC
6 POWDER (GRAM) TOPICAL NIGHTLY PRN
Status: DISCONTINUED | OUTPATIENT
Start: 2022-01-01 | End: 2022-01-01 | Stop reason: HOSPADM

## 2022-01-01 RX ORDER — IBUPROFEN 200 MG
16 TABLET ORAL
Status: DISCONTINUED | OUTPATIENT
Start: 2022-01-01 | End: 2022-01-01 | Stop reason: HOSPADM

## 2022-01-01 RX ADMIN — SODIUM CHLORIDE, SODIUM LACTATE, POTASSIUM CHLORIDE, AND CALCIUM CHLORIDE 1000 ML: .6; .31; .03; .02 INJECTION, SOLUTION INTRAVENOUS at 03:02

## 2022-01-01 RX ADMIN — OXYCODONE HYDROCHLORIDE AND ACETAMINOPHEN 1 TABLET: 5; 325 TABLET ORAL at 04:02

## 2022-01-01 RX ADMIN — ACETAMINOPHEN 650 MG: 325 TABLET ORAL at 12:02

## 2022-01-01 RX ADMIN — SODIUM BICARBONATE 650 MG TABLET 650 MG: at 09:02

## 2022-01-01 RX ADMIN — ACETAMINOPHEN 650 MG: 325 TABLET ORAL at 08:02

## 2022-01-01 RX ADMIN — SODIUM CHLORIDE: 0.9 INJECTION, SOLUTION INTRAVENOUS at 03:02

## 2022-01-01 RX ADMIN — OXYCODONE HYDROCHLORIDE AND ACETAMINOPHEN 1 TABLET: 5; 325 TABLET ORAL at 11:02

## 2022-01-01 RX ADMIN — OXYCODONE HYDROCHLORIDE AND ACETAMINOPHEN 1 TABLET: 5; 325 TABLET ORAL at 02:02

## 2022-01-01 RX ADMIN — Medication 6 MG: at 08:02

## 2022-01-01 RX ADMIN — ACETAMINOPHEN 650 MG: 325 TABLET ORAL at 05:02

## 2022-01-01 RX ADMIN — SODIUM CHLORIDE, SODIUM LACTATE, POTASSIUM CHLORIDE, AND CALCIUM CHLORIDE 1000 ML: .6; .31; .03; .02 INJECTION, SOLUTION INTRAVENOUS at 04:02

## 2022-01-01 RX ADMIN — OXYCODONE HYDROCHLORIDE AND ACETAMINOPHEN 1 TABLET: 5; 325 TABLET ORAL at 07:02

## 2022-01-01 RX ADMIN — MAGNESIUM SULFATE 1 G: 1 INJECTION INTRAVENOUS at 11:02

## 2022-01-01 RX ADMIN — SODIUM BICARBONATE 650 MG TABLET 650 MG: at 04:02

## 2022-01-01 RX ADMIN — SODIUM CHLORIDE: 0.9 INJECTION, SOLUTION INTRAVENOUS at 07:02

## 2022-01-01 RX ADMIN — SODIUM CHLORIDE 10 ML: 9 INJECTION INTRAMUSCULAR; INTRAVENOUS; SUBCUTANEOUS at 10:02

## 2022-01-01 RX ADMIN — SODIUM CHLORIDE 10 ML: 9 INJECTION INTRAMUSCULAR; INTRAVENOUS; SUBCUTANEOUS at 06:02

## 2022-01-01 RX ADMIN — OXYCODONE HYDROCHLORIDE AND ACETAMINOPHEN 1 TABLET: 5; 325 TABLET ORAL at 09:02

## 2022-01-01 RX ADMIN — Medication 6 MG: at 09:02

## 2022-01-01 RX ADMIN — SODIUM BICARBONATE 650 MG TABLET 650 MG: at 11:02

## 2022-01-01 RX ADMIN — SODIUM CHLORIDE 10 ML: 9 INJECTION INTRAMUSCULAR; INTRAVENOUS; SUBCUTANEOUS at 02:02

## 2022-01-29 NOTE — PLAN OF CARE
Patient remains free from falls and injuries. Blood transfusing. No adverse reactions. Pain managed with oral pain medication. Will continue to monitor.   None known

## 2022-02-05 NOTE — PHARMACY MED REC
"Admission Medication History     The home medication history was taken by David Thornton.    You may go to "Admission" then "Reconcile Home Medications" tabs to review and/or act upon these items.      The home medication list has been updated by the Pharmacy department.    Please read ALL comments highlighted in yellow.    Please address this information as you see fit.     Feel free to contact us if you have any questions or require assistance.      David Thornton  [id number]    Current Outpatient Medications on File Prior to Encounter   Medication Sig Dispense Refill Last Dose    amLODIPine (NORVASC) 5 MG tablet Take 1 tablet (5 mg total) by mouth once daily. 30 tablet 12 2/5/2022 at Unknown time    ascorbic acid, vitamin C, (VITAMIN C) 500 MG tablet Take 500 mg by mouth once daily.   2/5/2022 at Unknown time    aspirin (ECOTRIN) 81 MG EC tablet Take 81 mg by mouth once daily.   2/5/2022 at Unknown time    atorvastatin (LIPITOR) 40 MG tablet TAKE 1 TABLET EVERY DAY (Patient taking differently: every evening.) 90 tablet 3 2/4/2022 at Unknown time    carvediloL (COREG) 25 MG tablet Take 1 tablet (25 mg total) by mouth 2 (two) times daily with meals. 180 tablet 1 2/5/2022 at Unknown time    cetirizine (ZYRTEC) 10 MG tablet Take 10 mg by mouth daily as needed.   Past Week at Unknown time    cholecalciferol, vitamin D3, (VITAMIN D3) 50 mcg (2,000 unit) Cap Take 1 capsule by mouth once daily.   2/5/2022 at Unknown time    clopidogreL (PLAVIX) 75 mg tablet TAKE 1 TABLET (75 MG TOTAL) BY MOUTH ONCE DAILY. 90 tablet 3 2/5/2022 at Unknown time    furosemide (LASIX) 20 MG tablet Take 1 tablet (20 mg total) by mouth once daily. 30 tablet 0 2/5/2022 at Unknown time    hydrALAZINE (APRESOLINE) 50 MG tablet Take 1 tablet (50 mg total) by mouth every 12 (twelve) hours. 180 tablet 4 2/5/2022 at Unknown time    isosorbide mononitrate (IMDUR) 120 MG 24 hr tablet Take 1 tablet (120 mg total) by mouth once " daily. 90 tablet 3 2/5/2022 at Unknown time    omeprazole (PRILOSEC) 20 MG capsule TAKE 1 CAPSULE TWICE DAILY 180 capsule 3 2/5/2022 at Unknown time    polyethylene glycol (GLYCOLAX) 17 gram PwPk Take 17 g by mouth once daily. 100 each 6 2/4/2022 at Unknown time    ranolazine (RANEXA) 1,000 mg Tb12 Take 1 tablet (1,000 mg total) by mouth 2 (two) times daily. 60 tablet 12 2/5/2022 at Unknown time                             .

## 2022-02-05 NOTE — ED PROVIDER NOTES
SCRIBE #1 NOTE: I, Rodrigo Ibarra, am scribing for, and in the presence of, Sindi Samson MD. I have scribed the entire note.       History     Chief Complaint   Patient presents with    Weakness     C/o     Vomiting     C/o nausea and weakness     Review of patient's allergies indicates:   Allergen Reactions    Morphine Itching         History of Present Illness     HPI    2/5/2022, 3:05 PM  History obtained from the patient      History of Present Illness: Kodi Franco is a 72 y.o. male patient with a PMHx of CHF, respiratory failure,  who presents to the Emergency Department for evaluation of weakness which onset gradually for 1x week. Pt has a hx of a colonstomy in place. Pt reports that he has no appetite since Thursday and has been malnourished since then. Pt reports that he also has been vommiting since Thursday. Symptoms are constant and moderate in severity. No mitigating or exacerbating factors reported. Associated sxs include weakness, fatigue, chills, nausea, and fever. Patient denies any CP, leg swelling, cough, dysuria, flank pain, back pain, arthralgias, rectal pain, congestion, rhinorrhea , and all other sxs at this time. No prior tx. No further complaints or concerns at this time.       Arrival mode: Public Transportation    PCP: No primary care provider on file.        Past Medical History:  Past Medical History:   Diagnosis Date    Acute on chronic congestive heart failure 1/13/2020    Acute respiratory failure with hypoxia 1/14/2020    Analgesic nephropathy     Anemia     AP (angina pectoris) 1/11/2019    Arthritis     Cancer     colon cancer    Colon polyp     Repeat colonoscopy due in 9/14    COPD exacerbation 2/6/2020    Coronary artery disease     Diverticulosis     colonoscopy 2/21/2014    Dysthymia 2/13/2020    Encounter for blood transfusion     GERD (gastroesophageal reflux disease)     Hemorrhoids     colonoscopy 2/21/2014    Horseshoe kidney     Hyperglycemia  3/17/2014    Hyperlipidemia     Hypertension     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    Late complications of amputation stump     rseolved with further amputation( MRSA then none since 2014)    Lipoma of colon     colonoscopy 2/21/2014    Myocardial infarction     per patient 2000 & 9/2012    Peripheral vascular disease     Phantom limb syndrome     patient reports only intermittent not problematic, not worsening    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    Spinal cord disease     L4L5 disc    Stroke     Tobacco dependence     resolved    Ureteral stent retained        Past Surgical History:  Past Surgical History:   Procedure Laterality Date    ABDOMINAL AORTIC ANEURYSM REPAIR      ABDOMINAL AORTIC ANEURYSM REPAIR  1996/2014    AMPUTATION, LOWER LIMB      AORTA - BILATERAL FEMORAL ARTERY BYPASS GRAFT  2014    Left and right leg    COLONOSCOPY N/A 6/2/2020    Procedure: COLONOSCOPY;  Surgeon: Rosanna Harrington MD;  Location: [facility] ENDO;  Service: Endoscopy;  Laterality: N/A;    COLOSTOMY N/A 6/11/2020    Procedure: CREATION, COLOSTOMY;  Surgeon: Leonardo Yang MD;  Location: [facility] OR;  Service: General;  Laterality: N/A;    CORONARY ANGIOPLASTY WITH STENT PLACEMENT  2000    Three placed in heart    CYSTOSCOPY W/ RETROGRADES Left 5/29/2018    Procedure: CYSTOSCOPY, WITH RETROGRADE PYELOGRAM;  Surgeon: Scooter Jin IV, MD;  Location: [facility] OR;  Service: Urology;  Laterality: Left;    CYSTOSCOPY W/ URETERAL STENT PLACEMENT Left 5/29/2018    Procedure: CYSTOSCOPY, WITH URETERAL STENT INSERTION;  Surgeon: Scooter Jin IV, MD;  Location: [facility] OR;  Service: Urology;  Laterality: Left;    CYSTOSCOPY W/ URETERAL STENT PLACEMENT Left 2/4/2020    Procedure: CYSTOSCOPY, WITH URETERAL STENT INSERTION;  Surgeon: Scooter Jin IV, MD;  Location: [facility] OR;  Service: Urology;  Laterality: Left;    CYSTOSCOPY W/ URETERAL STENT REMOVAL Left 5/29/2018    Procedure: CYSTOSCOPY, WITH URETERAL  STENT REMOVAL;  Surgeon: Scooter Jin IV, MD;  Location: [facility] OR;  Service: Urology;  Laterality: Left;    CYSTOSCOPY W/ URETERAL STENT REMOVAL Left 2/4/2020    Procedure: CYSTOSCOPY, WITH URETERAL STENT REMOVAL;  Surgeon: Scooter Jin IV, MD;  Location: [facility] OR;  Service: Urology;  Laterality: Left;    ESOPHAGOGASTRODUODENOSCOPY N/A 6/2/2020    Procedure: EGD (ESOPHAGOGASTRODUODENOSCOPY);  Surgeon: Rosanna Harrington MD;  Location: [facility] ENDO;  Service: Endoscopy;  Laterality: N/A;    FOOT AMPUTATION THROUGH METATARSAL  1996    left    FOOT SURGERY Bilateral 1980's    per patient multiple toe amputations prior to.  partial foot amputation:first great toe then other toes     INJECTION OF ANESTHETIC AGENT INTO TISSUE PLANE DEFINED BY TRANSVERSUS ABDOMINIS MUSCLE N/A 6/11/2020    Procedure: BLOCK, TRANSVERSUS ABDOMINIS PLANE;  Surgeon: Leonardo Yang MD;  Location: [facility] OR;  Service: General;  Laterality: N/A;    KIDNEY SURGERY  2014    per patient separation of horseshoe kidney @ time of AAA repair    LEFT HEART CATHETERIZATION Left 3/7/2019    Procedure: CATHETERIZATION, HEART, LEFT;  Surgeon: Adriel Boone MD;  Location: [facility] CATH LAB;  Service: Cardiology;  Laterality: Left;  630 admit for IV hydration  10am start    LUNG LOBECTOMY Right 1970s    per patient not cancer    LYSIS OF ADHESIONS N/A 6/11/2020    Procedure: LYSIS, ADHESIONS;  Surgeon: Leonardo Yang MD;  Location: [facility] OR;  Service: General;  Laterality: N/A;    OMENTECTOMY N/A 6/11/2020    Procedure: OMENTECTOMY;  Surgeon: Leonardo Yang MD;  Location: [facility] OR;  Service: General;  Laterality: N/A;    right below knee amputation  2009 (approx)    SMALL INTESTINE SURGERY  2014    per patient partial @ time of aaa repair  not small bowel - large bowel bowel compromised bythtwe AAAbowel    SUBTOTAL COLECTOMY N/A 6/11/2020    Procedure: COLECTOMY, PARTIAL;  Surgeon: Leonardo Yang MD;  Location: [facility] OR;  Service:  General;  Laterality: N/A;    TONSILLECTOMY   aprox    URETERAL STENT PLACEMENT Left     annually replaced since 2012 or so  Dr Jin         Family History:  Family History   Problem Relation Age of Onset    Cancer Mother         lung    COPD Mother     Heart disease Father         MI but per patient bc of old age    Diabetes Daughter     Eczema Neg Hx     Lupus Neg Hx     Psoriasis Neg Hx     Melanoma Neg Hx     Kidney disease Neg Hx     Stroke Neg Hx     Mental illness Neg Hx     Mental retardation Neg Hx     Hypertension Neg Hx     Hyperlipidemia Neg Hx     Drug abuse Neg Hx     Alcohol abuse Neg Hx     Depression Neg Hx        Social History:  Social History     Tobacco Use    Smoking status: Former Smoker     Packs/day: 1.00     Years: 15.00     Pack years: 15.00     Quit date: 2009     Years since quittin.1    Smokeless tobacco: Never Used   Substance and Sexual Activity    Alcohol use: No    Drug use: No     Comment: Is on prescription opiod, no non prescribed use    Sexual activity: Not Currently     Partners: Female        Review of Systems     Review of Systems   Constitutional: Positive for appetite change (decreased), chills, fatigue and fever.   HENT: Negative for congestion, rhinorrhea and sore throat.    Respiratory: Negative for cough and shortness of breath.    Cardiovascular: Negative for chest pain and leg swelling.   Gastrointestinal: Positive for nausea and vomiting. Negative for rectal pain.   Genitourinary: Negative for dysuria and flank pain.   Musculoskeletal: Negative for arthralgias and back pain.   Skin: Negative for rash.   Neurological: Positive for weakness (generalized).   Hematological: Does not bruise/bleed easily.   All other systems reviewed and are negative.     Physical Exam     Initial Vitals [22 1454]   BP Pulse Resp Temp SpO2   (!) 96/50 70 20 98.9 °F (37.2 °C) 97 %      MAP       --          Physical Exam  Nursing Notes and  Vital Signs Reviewed.  Constitutional: Patient is in no apparent distress. Well-developed and well-nourished. Right BKA and hx of left leg amputation  Head: Atraumatic. Normocephalic.  Eyes: PERRL. EOM intact. Conjunctivae are not pale. No scleral icterus.  ENT: Mucous membranes are dry. Oropharynx is clear and symmetric.    Neck: Supple. Full ROM.   Cardiovascular: Regular rate. Regular rhythm. No murmurs, rubs, or gallops. Distal pulses are 2+ and symmetric.  Pulmonary/Chest: No respiratory distress. Clear to auscultation bilaterally. No wheezing or rales.  Abdominal: Soft and non-distended.  There is no tenderness.  No rebound, guarding, or rigidity. Good bowel sounds. Colostomy in place.  Genitourinary: No CVA tenderness  Musculoskeletal: Moves all extremities. No obvious deformities. No edema. No calf tenderness.  Skin: Warm and dry.  Neurological:  Alert, awake, and appropriate.  Normal speech.  No acute focal neurological deficits are appreciated.  Psychiatric: Normal affect. Good eye contact. Appropriate in content.     ED Course   Critical Care    Date/Time: 2/5/2022 4:36 PM  Performed by: Sindi Samson MD  Authorized by: Jose Brandt MD   Direct patient critical care time: 25 minutes  Additional history critical care time: 5 minutes  Ordering / reviewing critical care time: 5 minutes  Documentation critical care time: 5 minutes  Consulting other physicians critical care time: 5 minutes  Total critical care time (exclusive of procedural time) : 45 minutes  Critical care was necessary to treat or prevent imminent or life-threatening deterioration of the following conditions: renal failure and dehydration.  Critical care was time spent personally by me on the following activities: blood draw for specimens, discussions with primary provider, evaluation of patient's response to treatment, ordering and performing treatments and interventions, pulse oximetry, re-evaluation of patient's condition,  ordering and review of laboratory studies, examination of patient, development of treatment plan with patient or surrogate, discussions with consultants, interpretation of cardiac output measurements, obtaining history from patient or surrogate, ordering and review of radiographic studies and review of old charts.        ED Vital Signs:  Vitals:    02/06/22 1100 02/06/22 1149 02/06/22 1300 02/06/22 1404   BP:  138/63     Pulse: 84 90 92    Resp: 18 18 17 18   Temp:  98.3 °F (36.8 °C)     TempSrc:  Axillary     SpO2:  95%     Weight:       Height:        02/06/22 1604 02/06/22 1610 02/06/22 1700 02/06/22 2015   BP: (!) 109/56   139/66   Pulse: 87 89 97 92   Resp: 18  18 18   Temp: 98.8 °F (37.1 °C)   98.2 °F (36.8 °C)   TempSrc: Oral   Oral   SpO2: (!) 89% (!) 92%  95%   Weight:       Height:        02/06/22 2107 02/07/22 0011 02/07/22 0426 02/07/22 0445   BP:  (!) 159/70 (!) 158/70    Pulse:  93 90    Resp: 14 20 20 14   Temp:  99.1 °F (37.3 °C) 99.4 °F (37.4 °C)    TempSrc:  Oral Oral    SpO2:  (!) 92% (!) 93%    Weight:   87.8 kg (193 lb 9 oz)    Height:        02/07/22 0828 02/07/22 0917 02/07/22 1150   BP: (!) 124/58     Pulse: 84 82    Resp: 16  16   Temp: 98.4 °F (36.9 °C)     TempSrc: Axillary     SpO2: (!) 94%     Weight:      Height:          Abnormal Lab Results:  Labs Reviewed   CBC W/ AUTO DIFFERENTIAL - Abnormal; Notable for the following components:       Result Value    RBC 2.83 (*)     Hemoglobin 8.5 (*)     Hematocrit 26.5 (*)     RDW 14.6 (*)     Platelets 115 (*)     Immature Granulocytes 1.0 (*)     Immature Grans (Abs) 0.08 (*)     Lymph # 0.3 (*)     Gran % 86.5 (*)     Lymph % 3.4 (*)     All other components within normal limits   COMPREHENSIVE METABOLIC PANEL - Abnormal; Notable for the following components:    Sodium 130 (*)     CO2 16 (*)     Glucose 135 (*)     BUN 47 (*)     Creatinine 3.9 (*)     Calcium 8.0 (*)     Albumin 2.8 (*)     eGFR if  17 (*)     eGFR if non   14 (*)     All other components within normal limits   MAGNESIUM - Abnormal; Notable for the following components:    Magnesium 1.5 (*)     All other components within normal limits   LIPASE   LACTIC ACID, PLASMA        All Lab Results:  Results for orders placed or performed during the hospital encounter of 02/05/22   CBC W/ AUTO DIFFERENTIAL   Result Value Ref Range    WBC 7.64 3.90 - 12.70 K/uL    RBC 2.83 (L) 4.60 - 6.20 M/uL    Hemoglobin 8.5 (L) 14.0 - 18.0 g/dL    Hematocrit 26.5 (L) 40.0 - 54.0 %    MCV 94 82 - 98 fL    MCH 30.0 27.0 - 31.0 pg    MCHC 32.1 32.0 - 36.0 g/dL    RDW 14.6 (H) 11.5 - 14.5 %    Platelets 115 (L) 150 - 450 K/uL    MPV 10.3 9.2 - 12.9 fL    Immature Granulocytes 1.0 (H) 0.0 - 0.5 %    Gran # (ANC) 6.6 1.8 - 7.7 K/uL    Immature Grans (Abs) 0.08 (H) 0.00 - 0.04 K/uL    Lymph # 0.3 (L) 1.0 - 4.8 K/uL    Mono # 0.7 0.3 - 1.0 K/uL    Eos # 0.0 0.0 - 0.5 K/uL    Baso # 0.01 0.00 - 0.20 K/uL    nRBC 0 0 /100 WBC    Gran % 86.5 (H) 38.0 - 73.0 %    Lymph % 3.4 (L) 18.0 - 48.0 %    Mono % 8.9 4.0 - 15.0 %    Eosinophil % 0.1 0.0 - 8.0 %    Basophil % 0.1 0.0 - 1.9 %    Platelet Estimate Appears normal     Aniso Slight     Poik Slight     Hypo Occasional     Differential Method Automated    Comp. Metabolic Panel   Result Value Ref Range    Sodium 130 (L) 136 - 145 mmol/L    Potassium 4.0 3.5 - 5.1 mmol/L    Chloride 103 95 - 110 mmol/L    CO2 16 (L) 23 - 29 mmol/L    Glucose 135 (H) 70 - 110 mg/dL    BUN 47 (H) 8 - 23 mg/dL    Creatinine 3.9 (H) 0.5 - 1.4 mg/dL    Calcium 8.0 (L) 8.7 - 10.5 mg/dL    Total Protein 6.8 6.0 - 8.4 g/dL    Albumin 2.8 (L) 3.5 - 5.2 g/dL    Total Bilirubin 0.8 0.1 - 1.0 mg/dL    Alkaline Phosphatase 134 55 - 135 U/L    AST 19 10 - 40 U/L    ALT 21 10 - 44 U/L    Anion Gap 11 8 - 16 mmol/L    eGFR if African American 17 (A) >60 mL/min/1.73 m^2    eGFR if non African American 14 (A) >60 mL/min/1.73 m^2   Lipase   Result Value Ref Range    Lipase 15  4 - 60 U/L   Urinalysis, Reflex to Urine Culture Urine, Clean Catch    Specimen: Urine   Result Value Ref Range    Specimen UA Urine, Clean Catch     Color, UA Yellow Yellow, Straw, Sneha    Appearance, UA Clear Clear    pH, UA 6.0 5.0 - 8.0    Specific Gravity, UA 1.015 1.005 - 1.030    Protein, UA Trace (A) Negative    Glucose, UA Negative Negative    Ketones, UA Negative Negative    Bilirubin (UA) Negative Negative    Occult Blood UA Negative Negative    Nitrite, UA Negative Negative    Urobilinogen, UA Negative <2.0 EU/dL    Leukocytes, UA Negative Negative   Lactic acid, plasma   Result Value Ref Range    Lactate (Lactic Acid) 1.0 0.5 - 2.2 mmol/L   Magnesium   Result Value Ref Range    Magnesium 1.5 (L) 1.6 - 2.6 mg/dL   Iron and TIBC   Result Value Ref Range    Iron 24 (L) 45 - 160 ug/dL    Transferrin 125 (L) 200 - 375 mg/dL    TIBC 185 (L) 250 - 450 ug/dL    Saturated Iron 13 (L) 20 - 50 %   Ferritin   Result Value Ref Range    Ferritin 2,316 (H) 20.0 - 300.0 ng/mL   Transferrin   Result Value Ref Range    Transferrin 125 (L) 200 - 375 mg/dL   CBC auto differential   Result Value Ref Range    WBC 6.26 3.90 - 12.70 K/uL    RBC 2.77 (L) 4.60 - 6.20 M/uL    Hemoglobin 8.6 (L) 14.0 - 18.0 g/dL    Hematocrit 25.7 (L) 40.0 - 54.0 %    MCV 93 82 - 98 fL    MCH 31.0 27.0 - 31.0 pg    MCHC 33.5 32.0 - 36.0 g/dL    RDW 14.6 (H) 11.5 - 14.5 %    Platelets 102 (L) 150 - 450 K/uL    MPV 10.5 9.2 - 12.9 fL    Immature Granulocytes 2.4 (H) 0.0 - 0.5 %    Gran # (ANC) 5.4 1.8 - 7.7 K/uL    Immature Grans (Abs) 0.15 (H) 0.00 - 0.04 K/uL    Lymph # 0.3 (L) 1.0 - 4.8 K/uL    Mono # 0.4 0.3 - 1.0 K/uL    Eos # 0.0 0.0 - 0.5 K/uL    Baso # 0.01 0.00 - 0.20 K/uL    nRBC 0 0 /100 WBC    Gran % 85.7 (H) 38.0 - 73.0 %    Lymph % 4.6 (L) 18.0 - 48.0 %    Mono % 6.9 4.0 - 15.0 %    Eosinophil % 0.2 0.0 - 8.0 %    Basophil % 0.2 0.0 - 1.9 %    Differential Method Automated    Comprehensive metabolic panel   Result Value Ref Range     Sodium 130 (L) 136 - 145 mmol/L    Potassium 3.7 3.5 - 5.1 mmol/L    Chloride 105 95 - 110 mmol/L    CO2 14 (L) 23 - 29 mmol/L    Glucose 117 (H) 70 - 110 mg/dL    BUN 46 (H) 8 - 23 mg/dL    Creatinine 3.4 (H) 0.5 - 1.4 mg/dL    Calcium 7.6 (L) 8.7 - 10.5 mg/dL    Total Protein 6.1 6.0 - 8.4 g/dL    Albumin 2.7 (L) 3.5 - 5.2 g/dL    Total Bilirubin 0.9 0.1 - 1.0 mg/dL    Alkaline Phosphatase 137 (H) 55 - 135 U/L    AST 44 (H) 10 - 40 U/L    ALT 22 10 - 44 U/L    Anion Gap 11 8 - 16 mmol/L    eGFR if African American 20 (A) >60 mL/min/1.73 m^2    eGFR if non African American 17 (A) >60 mL/min/1.73 m^2   Basic Metabolic Panel   Result Value Ref Range    Sodium 132 (L) 136 - 145 mmol/L    Potassium 3.5 3.5 - 5.1 mmol/L    Chloride 105 95 - 110 mmol/L    CO2 16 (L) 23 - 29 mmol/L    Glucose 170 (H) 70 - 110 mg/dL    BUN 43 (H) 8 - 23 mg/dL    Creatinine 2.8 (H) 0.5 - 1.4 mg/dL    Calcium 7.8 (L) 8.7 - 10.5 mg/dL    Anion Gap 11 8 - 16 mmol/L    eGFR if African American 25 (A) >60 mL/min/1.73 m^2    eGFR if non African American 21 (A) >60 mL/min/1.73 m^2   Type & Screen   Result Value Ref Range    Group & Rh O POS     Indirect Yani NEG    POCT glucose   Result Value Ref Range    POCT Glucose 128 (H) 70 - 110 mg/dL   POCT glucose   Result Value Ref Range    POCT Glucose 132 (H) 70 - 110 mg/dL   POCT glucose   Result Value Ref Range    POCT Glucose 156 (H) 70 - 110 mg/dL   POCT glucose   Result Value Ref Range    POCT Glucose 154 (H) 70 - 110 mg/dL       Imaging Results:  Imaging Results          X-Ray Abdomen Flat And Erect (Final result)  Result time 02/05/22 16:16:55    Final result by Severo Gonsalves MD (02/05/22 16:16:55)                 Impression:      No definite acute abnormality.      Electronically signed by: Severo Gonsalves  Date:    02/05/2022  Time:    16:16             Narrative:    EXAMINATION:  XR ABDOMEN FLAT AND ERECT    CLINICAL HISTORY:  Nausea with vomiting, unspecified    TECHNIQUE:  Flat and erect  AP views of the abdomen were performed.    COMPARISON:  None    FINDINGS:  No air-fluid levels on upright radiograph.  No dilated loops of small bowel on supine radiograph.    Stool burden within the colon is within normal limits.    No calculi overlie the renal shadows.    Osseous structures are grossly intact.  Lung bases are clear.                                 The EKG was ordered, reviewed, and independently interpreted by the ED provider.  Interpretation time: 03:40:51  Rate: 64 BPM  Rhythm: normal sinus rhythm  Interpretation: No acute ST Elevations. No STEMI.         The Emergency Provider reviewed the vital signs and test results, which are outlined above.     ED Discussion     4:44 PM: Discussed case with Nelida Alvarado NP ([facility]). Dr. Lantigua agrees with current care and management of pt and accepts admission.   Admitting Service: [facility]  Admitting Physician: Dr. Lantigua  Admit to: Tele Obs    16:44 PM - Re-evaluation:  Discussed test results, shared treatment plan, and the need for admission with patient and family. They understand and agree to the plan as discussed. Answered questions at this time.                    ED Medication(s):  Medications   lactated ringers bolus 1,000 mL (0 mLs Intravenous Stopped 2/5/22 1630)   lactated ringers bolus 1,000 mL (0 mLs Intravenous Stopped 2/5/22 1734)   magnesium sulfate in dextrose IVPB (premix) 1 g (0 g Intravenous Stopped 2/6/22 1237)       Discharge Medication List as of 2/7/2022  2:23 PM      START taking these medications    Details   sodium bicarbonate 650 MG tablet Take 1 tablet (650 mg total) by mouth 2 (two) times daily., Starting Mon 2/7/2022, Until Tue 2/7/2023, Normal      traMADoL (ULTRAM) 50 mg tablet Take 1 tablet (50 mg total) by mouth every 12 (twelve) hours as needed for Pain., Starting Mon 2/7/2022, Until Wed 2/9/2022 at 2359, Normal              Follow-up Information     Nephrology  In 2 weeks.                            Scribe Attestation:   Scribe #1: I performed the above scribed service and the documentation accurately describes the services I performed. I attest to the accuracy of the note.     Attending:   Physician Attestation Statement for Scribe #1: I, Sindi Samson MD, personally performed the services described in this documentation, as scribed by Rodrigo Ibarra, in my presence, and it is both accurate and complete.           Clinical Impression       ICD-10-CM ICD-9-CM   1. Acute renal failure superimposed on stage 3 chronic kidney disease, unspecified acute renal failure type, unspecified whether stage 3a or 3b CKD  N17.9 584.9    N18.30 585.3   2. Weakness  R53.1 780.79   3. Nausea & vomiting  R11.2 787.01   4. Intravascular volume depletion  E86.1 276.52   5. Acute on chronic anemia  D64.9 285.9   6. Chest pain  R07.9 786.50   7. Metabolic acidosis  E87.2 276.2   8. Iron deficiency anemia secondary to inadequate dietary iron intake  D50.8 280.1   9. Renal dysfunction  N28.9 593.9   10. Iron deficiency anemia due to chronic blood loss  D50.0 280.0       Disposition:   Disposition: Placed in Observation  Condition: Stable       Sindi Samson MD  02/05/22 1831       Sindi Samson MD  02/22/22 1637

## 2022-02-06 PROBLEM — E87.20 METABOLIC ACIDOSIS: Status: ACTIVE | Noted: 2022-01-01

## 2022-02-06 PROBLEM — E87.1 HYPONATREMIA: Status: ACTIVE | Noted: 2022-01-01

## 2022-02-06 NOTE — HPI
Kodi Franco is a 72 y.o. male with PMHx of CHF, COPD, HTN, HLD, PAD, with colostomy who presented to the Emergency Department today with c/o generalized weakness for the last 7 days. It has been constant and of moderate severity.  Aggravating factors include none. Alleviating factors include none. Associated symptoms include nausea, emesis, loss of appetite, fatigue, chills. In Emergency Department noted to have MARCELA with Creat 3.9 BUN 39. KUB WNL.   Patient placed in observation for further monitoring.  Full Code. SDM is wife.

## 2022-02-06 NOTE — SUBJECTIVE & OBJECTIVE
Interval History:feels better. Weakness has improved. Will continue gentle hydration overnight. Has been afebrile. N/V has improved. Tolerated turkey sandwich last night. Denies high output from colostomy.     Review of Systems   Constitutional: Positive for activity change. Negative for appetite change, chills, fatigue and fever.   Respiratory: Negative for cough, shortness of breath and wheezing.    Cardiovascular: Negative for chest pain, palpitations and leg swelling.   Gastrointestinal: Negative for abdominal pain, constipation, diarrhea, nausea and vomiting.   Genitourinary: Negative for difficulty urinating and urgency.   Musculoskeletal: Positive for back pain, gait problem and myalgias.   Skin: Positive for wound (DTI right BKA stump).   Neurological: Positive for dizziness, weakness, light-headedness and headaches.   Psychiatric/Behavioral: Negative for behavioral problems and decreased concentration. The patient is not nervous/anxious.    All other systems reviewed and are negative.     Objective:     Vital Signs (Most Recent):  Temp: 98.3 °F (36.8 °C) (02/06/22 1149)  Pulse: 90 (02/06/22 1149)  Resp: 18 (02/06/22 1149)  BP: 138/63 (02/06/22 1149)  SpO2: 95 % (02/06/22 1149) Vital Signs (24h Range):  Temp:  [57.2 °F (14 °C)-98.9 °F (37.2 °C)] 98.3 °F (36.8 °C)  Pulse:  [63-90] 90  Resp:  [13-20] 18  SpO2:  [93 %-98 %] 95 %  BP: ()/(50-64) 138/63     Weight: 87.6 kg (193 lb 2 oz)  Body mass index is 24.8 kg/m².    Intake/Output Summary (Last 24 hours) at 2/6/2022 1340  Last data filed at 2/6/2022 0800  Gross per 24 hour   Intake 1240 ml   Output 400 ml   Net 840 ml      Physical Exam  Constitutional:       Appearance: Normal appearance.   HENT:      Head: Normocephalic and atraumatic.      Nose: Nose normal.      Mouth/Throat:      Comments: Pale membranes  Eyes:      Extraocular Movements: Extraocular movements intact.      Conjunctiva/sclera: Conjunctivae normal.   Cardiovascular:      Rate and  Rhythm: Normal rate and regular rhythm.      Pulses: Normal pulses.      Heart sounds: Normal heart sounds. No murmur heard.      Pulmonary:      Effort: Pulmonary effort is normal. No respiratory distress.      Breath sounds: Normal breath sounds. No wheezing.   Abdominal:      General: Bowel sounds are normal. There is no distension.      Tenderness: There is no abdominal tenderness.      Comments: Colostomy to right abdomen-normal output   Musculoskeletal:         General: Normal range of motion.      Cervical back: Normal range of motion.      Comments: Right BKA, left partial foot amputation   Skin:     Comments: DTI right stump   Neurological:      Mental Status: He is alert and oriented to person, place, and time.   Psychiatric:         Mood and Affect: Mood normal.         Behavior: Behavior normal.       Significant Labs:   All pertinent labs within the past 24 hours have been reviewed.  CBC:   Recent Labs   Lab 02/05/22  1522 02/06/22  0644   WBC 7.64 6.26   HGB 8.5* 8.6*   HCT 26.5* 25.7*   * 102*     CMP:   Recent Labs   Lab 02/05/22  1522 02/06/22  0644   * 130*   K 4.0 3.7    105   CO2 16* 14*   * 117*   BUN 47* 46*   CREATININE 3.9* 3.4*   CALCIUM 8.0* 7.6*   PROT 6.8 6.1   ALBUMIN 2.8* 2.7*   BILITOT 0.8 0.9   ALKPHOS 134 137*   AST 19 44*   ALT 21 22   ANIONGAP 11 11   EGFRNONAA 14* 17*       Significant Imaging: I have reviewed all pertinent imaging results/findings within the past 24 hours.

## 2022-02-06 NOTE — ASSESSMENT & PLAN NOTE
Chronic, controlled.  Latest blood pressure and vitals reviewed-   Temp:  [98.9 °F (37.2 °C)]   Pulse:  [63-70]   Resp:  [13-20]   BP: ()/(50-58)   SpO2:  [96 %-98 %] .   Home meds for hypertension were reviewed and noted below.   Hypertension Medications             amLODIPine (NORVASC) 5 MG tablet Take 1 tablet (5 mg total) by mouth once daily.    carvediloL (COREG) 25 MG tablet Take 1 tablet (25 mg total) by mouth 2 (two) times daily with meals.    furosemide (LASIX) 20 MG tablet Take 1 tablet (20 mg total) by mouth once daily.    hydrALAZINE (APRESOLINE) 50 MG tablet Take 1 tablet (50 mg total) by mouth every 12 (twelve) hours.    isosorbide mononitrate (IMDUR) 120 MG 24 hr tablet Take 1 tablet (120 mg total) by mouth once daily.          While in the hospital, will manage blood pressure as follows; Continue home antihypertensive regimen    Will utilize p.r.n. blood pressure medication only if patient's blood pressure greater than  180/110 and he develops symptoms such as worsening chest pain or shortness of breath.

## 2022-02-06 NOTE — ASSESSMENT & PLAN NOTE
Iron studies pending  Repeat H&H in AM  Transfuse if needed  Type and screen ordered    2/6/22  Iron studies reviewed  Hemoglobin stable. No indication for transfusion

## 2022-02-06 NOTE — ASSESSMENT & PLAN NOTE
Chronic, controlled.  Latest blood pressure and vitals reviewed-   Temp:  [57.2 °F (14 °C)-98.9 °F (37.2 °C)]   Pulse:  [63-90]   Resp:  [13-20]   BP: ()/(50-64)   SpO2:  [93 %-98 %] .   Home meds for hypertension were reviewed and noted below.   Hypertension Medications             amLODIPine (NORVASC) 5 MG tablet Take 1 tablet (5 mg total) by mouth once daily.    carvediloL (COREG) 25 MG tablet Take 1 tablet (25 mg total) by mouth 2 (two) times daily with meals.    furosemide (LASIX) 20 MG tablet Take 1 tablet (20 mg total) by mouth once daily.    hydrALAZINE (APRESOLINE) 50 MG tablet Take 1 tablet (50 mg total) by mouth every 12 (twelve) hours.    isosorbide mononitrate (IMDUR) 120 MG 24 hr tablet Take 1 tablet (120 mg total) by mouth once daily.          While in the hospital, will manage blood pressure as follows; Continue home antihypertensive regimen    Will utilize p.r.n. blood pressure medication only if patient's blood pressure greater than  180/110 and he develops symptoms such as worsening chest pain or shortness of breath.    2/6/22  Has been stable

## 2022-02-06 NOTE — H&P
[facility] Medicine  History & Physical    Patient Name: Kodi Franco  MRN: [id number]  Patient Class: OP- Observation  Admission Date: 2/5/2022  Attending Physician: Jose Brandt, *   Primary Care Provider: Norma Nuñez MD         Patient information was obtained from patient, spouse/SO, past medical records and ER records.     Subjective:     Principal Problem:<principal problem not specified>    Chief Complaint:   Chief Complaint   Patient presents with    Weakness     C/o     Vomiting     C/o nausea and weakness        HPI: Kodi Franco is a 72 y.o. male with PMHx of CHF, COPD, HTN, HLD, PAD, with colostomy who presented to the Emergency Department today with c/o generalized weakness for the last 7 days. It has been constant and of moderate severity.  Aggravating factors include none. Alleviating factors include none. Associated symptoms include nausea, emesis, loss of appetite, fatigue, chills. In Emergency Department noted to have MARCELA with Creat 3.9 BUN 39. KUB WNL.   Patient placed in observation for further monitoring.  Full Code. SDM is wife.          Past Medical History:   Diagnosis Date    Acute on chronic congestive heart failure 1/13/2020    Acute respiratory failure with hypoxia 1/14/2020    Analgesic nephropathy     Anemia     AP (angina pectoris) 1/11/2019    Arthritis     Cancer     colon cancer    Colon polyp     Repeat colonoscopy due in 9/14    COPD exacerbation 2/6/2020    Coronary artery disease     Diverticulosis     colonoscopy 2/21/2014    Dysthymia 2/13/2020    Encounter for blood transfusion     GERD (gastroesophageal reflux disease)     Hemorrhoids     colonoscopy 2/21/2014    Horseshoe kidney     Hyperglycemia 3/17/2014    Hyperlipidemia     Hypertension     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    Late complications of amputation stump     rseolved with further amputation( MRSA then none since 2014)    Lipoma of colon      colonoscopy 2/21/2014    Myocardial infarction     per patient 2000 & 9/2012    Peripheral vascular disease     Phantom limb syndrome     patient reports only intermittent not problematic, not worsening    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    Spinal cord disease     L4L5 disc    Stroke     Tobacco dependence     resolved    Ureteral stent retained        Past Surgical History:   Procedure Laterality Date    ABDOMINAL AORTIC ANEURYSM REPAIR      ABDOMINAL AORTIC ANEURYSM REPAIR  1996/2014    AMPUTATION, LOWER LIMB      AORTA - BILATERAL FEMORAL ARTERY BYPASS GRAFT  2014    Left and right leg    COLONOSCOPY N/A 6/2/2020    Procedure: COLONOSCOPY;  Surgeon: Rosanna Harrington MD;  Location: [facility] ENDO;  Service: Endoscopy;  Laterality: N/A;    COLOSTOMY N/A 6/11/2020    Procedure: CREATION, COLOSTOMY;  Surgeon: Leonardo Yang MD;  Location: [facility] OR;  Service: General;  Laterality: N/A;    CORONARY ANGIOPLASTY WITH STENT PLACEMENT  2000    Three placed in heart    CYSTOSCOPY W/ RETROGRADES Left 5/29/2018    Procedure: CYSTOSCOPY, WITH RETROGRADE PYELOGRAM;  Surgeon: Scooter Jin IV, MD;  Location: [facility] OR;  Service: Urology;  Laterality: Left;    CYSTOSCOPY W/ URETERAL STENT PLACEMENT Left 5/29/2018    Procedure: CYSTOSCOPY, WITH URETERAL STENT INSERTION;  Surgeon: Scooter Jin IV, MD;  Location: [facility] OR;  Service: Urology;  Laterality: Left;    CYSTOSCOPY W/ URETERAL STENT PLACEMENT Left 2/4/2020    Procedure: CYSTOSCOPY, WITH URETERAL STENT INSERTION;  Surgeon: Scooter Jin IV, MD;  Location: [facility] OR;  Service: Urology;  Laterality: Left;    CYSTOSCOPY W/ URETERAL STENT REMOVAL Left 5/29/2018    Procedure: CYSTOSCOPY, WITH URETERAL STENT REMOVAL;  Surgeon: Scooter Jin IV, MD;  Location: [facility] OR;  Service: Urology;  Laterality: Left;    CYSTOSCOPY W/ URETERAL STENT REMOVAL Left 2/4/2020    Procedure: CYSTOSCOPY, WITH URETERAL STENT REMOVAL;  Surgeon: Scooter MENESES  Davin NELSON MD;  Location: [facility] OR;  Service: Urology;  Laterality: Left;    ESOPHAGOGASTRODUODENOSCOPY N/A 6/2/2020    Procedure: EGD (ESOPHAGOGASTRODUODENOSCOPY);  Surgeon: Rosanna Harrington MD;  Location: [facility] END[facility];  Service: Endoscopy;  Laterality: N/A;    FOOT AMPUTATION THROUGH METATARSAL  1996    left    FOOT SURGERY Bilateral 1980's    per patient multiple toe amputations prior to.  partial foot amputation:first great toe then other toes     INJECTION OF ANESTHETIC AGENT INTO TISSUE PLANE DEFINED BY TRANSVERSUS ABDOMINIS MUSCLE N/A 6/11/2020    Procedure: BLOCK, TRANSVERSUS ABDOMINIS PLANE;  Surgeon: Leonardo Yang MD;  Location: [facility] OR;  Service: General;  Laterality: N/A;    KIDNEY SURGERY  2014    per patient separation of horseshoe kidney @ time of AAA repair    LEFT HEART CATHETERIZATION Left 3/7/2019    Procedure: CATHETERIZATION, HEART, LEFT;  Surgeon: Adriel Boone MD;  Location: [facility];  Service: Cardiology;  Laterality: Left;  630 admit for IV hydration  10am start    LUNG LOBECTOMY Right 1970s    per patient not cancer    LYSIS OF ADHESIONS N/A 6/11/2020    Procedure: LYSIS, ADHESIONS;  Surgeon: Leonardo Yang MD;  Location: [facility] OR;  Service: General;  Laterality: N/A;    OMENTECTOMY N/A 6/11/2020    Procedure: OMENTECTOMY;  Surgeon: Leonardo Yang MD;  Location: [facility] OR;  Service: General;  Laterality: N/A;    right below knee amputation  2009 (approx)    SMALL INTESTINE SURGERY  2014    per patient partial @ time of aaa repair  not small bowel - large bowel bowel compromised bythmarin AAAbowel    SUBTOTAL COLECTOMY N/A 6/11/2020    Procedure: COLECTOMY, PARTIAL;  Surgeon: Leonardo Yang MD;  Location: [facility] OR;  Service: General;  Laterality: N/A;    TONSILLECTOMY  1955 aprox    URETERAL STENT PLACEMENT Left     annually replaced since 2012 or so  Dr Jin       Review of patient's allergies indicates:   Allergen Reactions    Morphine Itching        Current Facility-Administered Medications on File Prior to Encounter   Medication    acetaminophen tablet 650 mg    albuterol inhaler 2 puff    diphenhydrAMINE injection 25 mg    EPINEPHrine (EPIPEN) 0.3 mg/0.3 mL pen injection 0.3 mg    methylPREDNISolone sodium succinate injection 40 mg    ondansetron disintegrating tablet 4 mg    sodium chloride 0.9% 500 mL flush bag    sodium chloride 0.9% flush 10 mL     Current Outpatient Medications on File Prior to Encounter   Medication Sig    amLODIPine (NORVASC) 5 MG tablet Take 1 tablet (5 mg total) by mouth once daily.    ascorbic acid, vitamin C, (VITAMIN C) 500 MG tablet Take 500 mg by mouth once daily.    aspirin (ECOTRIN) 81 MG EC tablet Take 81 mg by mouth once daily.    atorvastatin (LIPITOR) 40 MG tablet TAKE 1 TABLET EVERY DAY (Patient taking differently: every evening.)    carvediloL (COREG) 25 MG tablet Take 1 tablet (25 mg total) by mouth 2 (two) times daily with meals.    cetirizine (ZYRTEC) 10 MG tablet Take 10 mg by mouth daily as needed.    cholecalciferol, vitamin D3, (VITAMIN D3) 50 mcg (2,000 unit) Cap Take 1 capsule by mouth once daily.    clopidogreL (PLAVIX) 75 mg tablet TAKE 1 TABLET (75 MG TOTAL) BY MOUTH ONCE DAILY.    furosemide (LASIX) 20 MG tablet Take 1 tablet (20 mg total) by mouth once daily.    hydrALAZINE (APRESOLINE) 50 MG tablet Take 1 tablet (50 mg total) by mouth every 12 (twelve) hours.    isosorbide mononitrate (IMDUR) 120 MG 24 hr tablet Take 1 tablet (120 mg total) by mouth once daily.    omeprazole (PRILOSEC) 20 MG capsule TAKE 1 CAPSULE TWICE DAILY    polyethylene glycol (GLYCOLAX) 17 gram PwPk Take 17 g by mouth once daily.    ranolazine (RANEXA) 1,000 mg Tb12 Take 1 tablet (1,000 mg total) by mouth 2 (two) times daily.    [DISCONTINUED] famotidine (PEPCID) 20 MG tablet Take 1 tablet (20 mg total) by mouth 2 (two) times daily.    [DISCONTINUED] mupirocin (BACTROBAN) 2 % ointment Apply topically 3  (three) times daily.    [DISCONTINUED] nitroglycerin (NITROSTAT) 0.6 MG Subl Place 1 tablet (0.6 mg total) under the tongue every 5 (five) minutes as needed (max 3/ per episode).    [DISCONTINUED] OXYGEN-AIR DELIVERY SYSTEMS MISC by Misc.(Non-Drug; Combo Route) route.    [DISCONTINUED] triamcinolone acetonide 0.1% (KENALOG) 0.1 % cream Apply topically 2 (two) times daily.     Family History     Problem Relation (Age of Onset)    COPD Mother    Cancer Mother    Diabetes Daughter    Heart disease Father        Tobacco Use    Smoking status: Former Smoker     Packs/day: 1.00     Years: 15.00     Pack years: 15.00     Quit date: 2009     Years since quittin.1    Smokeless tobacco: Never Used   Substance and Sexual Activity    Alcohol use: No    Drug use: No     Comment: Is on prescription opiod, no non prescribed use    Sexual activity: Not Currently     Partners: Female     Review of Systems   Constitutional: Positive for activity change, appetite change, chills, fatigue and fever.   Respiratory: Negative for cough, shortness of breath and wheezing.    Cardiovascular: Negative for chest pain, palpitations and leg swelling.   Gastrointestinal: Positive for nausea and vomiting. Negative for abdominal pain, constipation and diarrhea.   Genitourinary: Negative for difficulty urinating and urgency.   Musculoskeletal: Positive for back pain, gait problem and myalgias.   Skin: Positive for wound (DTI right BKA stump).   Neurological: Positive for dizziness, weakness, light-headedness and headaches.   Psychiatric/Behavioral: Negative for behavioral problems and decreased concentration. The patient is not nervous/anxious.    All other systems reviewed and are negative.    Objective:     Vital Signs (Most Recent):  Temp: 98.9 °F (37.2 °C) (22 1454)  Pulse: 66 (22 171)  Resp: 17 (22 1632)  BP: (!) 115/57 (22)  SpO2: 98 % (22) Vital Signs (24h Range):  Temp:  [98.9 °F (37.2  °C)] 98.9 °F (37.2 °C)  Pulse:  [63-70] 66  Resp:  [13-20] 17  SpO2:  [96 %-98 %] 98 %  BP: ()/(50-58) 115/57     Weight: 86.9 kg (191 lb 9.3 oz)  Body mass index is 25.28 kg/m².    Physical Exam  Constitutional:       Appearance: Normal appearance.   HENT:      Head: Normocephalic and atraumatic.      Nose: Nose normal.      Mouth/Throat:      Comments: Pale membranes  Eyes:      Extraocular Movements: Extraocular movements intact.      Conjunctiva/sclera: Conjunctivae normal.   Cardiovascular:      Rate and Rhythm: Normal rate and regular rhythm.      Pulses: Normal pulses.      Heart sounds: Normal heart sounds. No murmur heard.      Pulmonary:      Effort: Pulmonary effort is normal. No respiratory distress.      Breath sounds: Normal breath sounds. No wheezing.   Abdominal:      General: Bowel sounds are normal. There is no distension.      Tenderness: There is no abdominal tenderness.      Comments: Colostomy to right abdomen   Musculoskeletal:         General: Normal range of motion.      Cervical back: Normal range of motion.      Comments: Right BKA, left partial foot amputation   Skin:     Comments: DTI right stump   Neurological:      Mental Status: He is alert and oriented to person, place, and time.   Psychiatric:         Mood and Affect: Mood normal.         Behavior: Behavior normal.             Significant Labs: All pertinent labs within the past 24 hours have been reviewed.  Results for orders placed or performed during the hospital encounter of 02/05/22   CBC W/ AUTO DIFFERENTIAL   Result Value Ref Range    WBC 7.64 3.90 - 12.70 K/uL    RBC 2.83 (L) 4.60 - 6.20 M/uL    Hemoglobin 8.5 (L) 14.0 - 18.0 g/dL    Hematocrit 26.5 (L) 40.0 - 54.0 %    MCV 94 82 - 98 fL    MCH 30.0 27.0 - 31.0 pg    MCHC 32.1 32.0 - 36.0 g/dL    RDW 14.6 (H) 11.5 - 14.5 %    Platelets 115 (L) 150 - 450 K/uL    MPV 10.3 9.2 - 12.9 fL    Immature Granulocytes 1.0 (H) 0.0 - 0.5 %    Gran # (ANC) 6.6 1.8 - 7.7 K/uL     Immature Grans (Abs) 0.08 (H) 0.00 - 0.04 K/uL    Lymph # 0.3 (L) 1.0 - 4.8 K/uL    Mono # 0.7 0.3 - 1.0 K/uL    Eos # 0.0 0.0 - 0.5 K/uL    Baso # 0.01 0.00 - 0.20 K/uL    nRBC 0 0 /100 WBC    Gran % 86.5 (H) 38.0 - 73.0 %    Lymph % 3.4 (L) 18.0 - 48.0 %    Mono % 8.9 4.0 - 15.0 %    Eosinophil % 0.1 0.0 - 8.0 %    Basophil % 0.1 0.0 - 1.9 %    Platelet Estimate Appears normal     Aniso Slight     Poik Slight     Hypo Occasional     Differential Method Automated    Comp. Metabolic Panel   Result Value Ref Range    Sodium 130 (L) 136 - 145 mmol/L    Potassium 4.0 3.5 - 5.1 mmol/L    Chloride 103 95 - 110 mmol/L    CO2 16 (L) 23 - 29 mmol/L    Glucose 135 (H) 70 - 110 mg/dL    BUN 47 (H) 8 - 23 mg/dL    Creatinine 3.9 (H) 0.5 - 1.4 mg/dL    Calcium 8.0 (L) 8.7 - 10.5 mg/dL    Total Protein 6.8 6.0 - 8.4 g/dL    Albumin 2.8 (L) 3.5 - 5.2 g/dL    Total Bilirubin 0.8 0.1 - 1.0 mg/dL    Alkaline Phosphatase 134 55 - 135 U/L    AST 19 10 - 40 U/L    ALT 21 10 - 44 U/L    Anion Gap 11 8 - 16 mmol/L    eGFR if African American 17 (A) >60 mL/min/1.73 m^2    eGFR if non African American 14 (A) >60 mL/min/1.73 m^2   Lipase   Result Value Ref Range    Lipase 15 4 - 60 U/L   Lactic acid, plasma   Result Value Ref Range    Lactate (Lactic Acid) 1.0 0.5 - 2.2 mmol/L   Magnesium   Result Value Ref Range    Magnesium 1.5 (L) 1.6 - 2.6 mg/dL       Significant Imaging: I have reviewed all pertinent imaging results/findings within the past 24 hours.   Imaging Results          X-Ray Abdomen Flat And Erect (Final result)  Result time 02/05/22 16:16:55    Final result by Severo Gonsalves MD (02/05/22 16:16:55)                 Impression:      No definite acute abnormality.      Electronically signed by: Severo Gonsalves  Date:    02/05/2022  Time:    16:16             Narrative:    EXAMINATION:  XR ABDOMEN FLAT AND ERECT    CLINICAL HISTORY:  Nausea with vomiting, unspecified    TECHNIQUE:  Flat and erect AP views of the abdomen were  performed.    COMPARISON:  None    FINDINGS:  No air-fluid levels on upright radiograph.  No dilated loops of small bowel on supine radiograph.    Stool burden within the colon is within normal limits.    No calculi overlie the renal shadows.    Osseous structures are grossly intact.  Lung bases are clear.                                  Assessment/Plan:     LISA (iron deficiency anemia)  Iron studies pending  Repeat H&H in AM  Transfuse if needed  Type and screen ordered      CKD (chronic kidney disease) stage 4, GFR 15-29 ml/min  Creatinine at baseline  Likely dehydrated given decreased appetitie  Slow IVF overnight recheck labs in AM      Essential hypertension  Chronic, controlled.  Latest blood pressure and vitals reviewed-   Temp:  [98.9 °F (37.2 °C)]   Pulse:  [63-70]   Resp:  [13-20]   BP: ()/(50-58)   SpO2:  [96 %-98 %] .   Home meds for hypertension were reviewed and noted below.   Hypertension Medications             amLODIPine (NORVASC) 5 MG tablet Take 1 tablet (5 mg total) by mouth once daily.    carvediloL (COREG) 25 MG tablet Take 1 tablet (25 mg total) by mouth 2 (two) times daily with meals.    furosemide (LASIX) 20 MG tablet Take 1 tablet (20 mg total) by mouth once daily.    hydrALAZINE (APRESOLINE) 50 MG tablet Take 1 tablet (50 mg total) by mouth every 12 (twelve) hours.    isosorbide mononitrate (IMDUR) 120 MG 24 hr tablet Take 1 tablet (120 mg total) by mouth once daily.          While in the hospital, will manage blood pressure as follows; Continue home antihypertensive regimen    Will utilize p.r.n. blood pressure medication only if patient's blood pressure greater than  180/110 and he develops symptoms such as worsening chest pain or shortness of breath.        VTE Risk Mitigation (From admission, onward)         Ordered     Reason for No Pharmacological VTE Prophylaxis  Once        Question:  Reasons:  Answer:  Risk of Bleeding    02/05/22 1830     IP VTE HIGH RISK PATIENT  Once          02/05/22 1830     Place sequential compression device  Until discontinued         02/05/22 1830                   Nelida Alvarado NP  Department of Hospital Medicine   '[facility]

## 2022-02-06 NOTE — PROGRESS NOTES
[facility] Medicine  Progress Note    Patient Name: Kodi Franco  MRN: [id number]  Patient Class: OP- Observation   Admission Date: 2/5/2022  Length of Stay: 0 days  Attending Physician: Jose Brandt, *  Primary Care Provider: Norma Nuñez MD    Subjective:     Principal Problem:Acute renal failure superimposed on stage 4 chronic kidney disease    HPI:  Kodi Franco is a 72 y.o. male with PMHx of CHF, COPD, HTN, HLD, PAD, with colostomy who presented to the Emergency Department today with c/o generalized weakness for the last 7 days. It has been constant and of moderate severity.  Aggravating factors include none. Alleviating factors include none. Associated symptoms include nausea, emesis, loss of appetite, fatigue, chills. In Emergency Department noted to have MARCELA with Creat 3.9 BUN 39. KUB WNL.   Patient placed in observation for further monitoring.  Full Code. SDM is wife.        Overview/Hospital Course:  Kodi Jackson is a 72 year old male who was admitted to [facility] for acute on chronic renal failure related to IVVD.       Interval History:feels better. Weakness has improved. Will continue gentle hydration overnight. Has been afebrile. N/V has improved. Tolerated turkey sandwich last night. Denies high output from colostomy.     Review of Systems   Constitutional: Positive for activity change. Negative for appetite change, chills, fatigue and fever.   Respiratory: Negative for cough, shortness of breath and wheezing.    Cardiovascular: Negative for chest pain, palpitations and leg swelling.   Gastrointestinal: Negative for abdominal pain, constipation, diarrhea, nausea and vomiting.   Genitourinary: Negative for difficulty urinating and urgency.   Musculoskeletal: Positive for back pain, gait problem and myalgias.   Skin: Positive for wound (DTI right BKA stump).   Neurological: Positive for dizziness, weakness, light-headedness and headaches.    Psychiatric/Behavioral: Negative for behavioral problems and decreased concentration. The patient is not nervous/anxious.    All other systems reviewed and are negative.     Objective:     Vital Signs (Most Recent):  Temp: 98.3 °F (36.8 °C) (02/06/22 1149)  Pulse: 90 (02/06/22 1149)  Resp: 18 (02/06/22 1149)  BP: 138/63 (02/06/22 1149)  SpO2: 95 % (02/06/22 1149) Vital Signs (24h Range):  Temp:  [57.2 °F (14 °C)-98.9 °F (37.2 °C)] 98.3 °F (36.8 °C)  Pulse:  [63-90] 90  Resp:  [13-20] 18  SpO2:  [93 %-98 %] 95 %  BP: ()/(50-64) 138/63     Weight: 87.6 kg (193 lb 2 oz)  Body mass index is 24.8 kg/m².    Intake/Output Summary (Last 24 hours) at 2/6/2022 1340  Last data filed at 2/6/2022 0800  Gross per 24 hour   Intake 1240 ml   Output 400 ml   Net 840 ml      Physical Exam  Constitutional:       Appearance: Normal appearance.   HENT:      Head: Normocephalic and atraumatic.      Nose: Nose normal.      Mouth/Throat:      Comments: Pale membranes  Eyes:      Extraocular Movements: Extraocular movements intact.      Conjunctiva/sclera: Conjunctivae normal.   Cardiovascular:      Rate and Rhythm: Normal rate and regular rhythm.      Pulses: Normal pulses.      Heart sounds: Normal heart sounds. No murmur heard.      Pulmonary:      Effort: Pulmonary effort is normal. No respiratory distress.      Breath sounds: Normal breath sounds. No wheezing.   Abdominal:      General: Bowel sounds are normal. There is no distension.      Tenderness: There is no abdominal tenderness.      Comments: Colostomy to right abdomen-normal output   Musculoskeletal:         General: Normal range of motion.      Cervical back: Normal range of motion.      Comments: Right BKA, left partial foot amputation   Skin:     Comments: DTI right stump   Neurological:      Mental Status: He is alert and oriented to person, place, and time.   Psychiatric:         Mood and Affect: Mood normal.         Behavior: Behavior normal.       Significant  Labs:   All pertinent labs within the past 24 hours have been reviewed.  CBC:   Recent Labs   Lab 02/05/22  1522 02/06/22  0644   WBC 7.64 6.26   HGB 8.5* 8.6*   HCT 26.5* 25.7*   * 102*     CMP:   Recent Labs   Lab 02/05/22  1522 02/06/22  0644   * 130*   K 4.0 3.7    105   CO2 16* 14*   * 117*   BUN 47* 46*   CREATININE 3.9* 3.4*   CALCIUM 8.0* 7.6*   PROT 6.8 6.1   ALBUMIN 2.8* 2.7*   BILITOT 0.8 0.9   ALKPHOS 134 137*   AST 19 44*   ALT 21 22   ANIONGAP 11 11   EGFRNONAA 14* 17*       Significant Imaging: I have reviewed all pertinent imaging results/findings within the past 24 hours.      Assessment/Plan:      * Acute renal failure superimposed on stage 4 chronic kidney disease  Creatinine not at baseline  Likely dehydrated given decreased appetitie  Slow IVF overnight recheck labs in AM    2/6/22  Gradually improving. 3.4 today.   Continue gentle hydration    Metabolic acidosis  Related to renal failure  Add sodium bicarbonate      Hyponatremia  Unchanged.   Monitor response with IVFs      LISA (iron deficiency anemia)  Iron studies pending  Repeat H&H in AM  Transfuse if needed  Type and screen ordered    2/6/22  Iron studies reviewed  Hemoglobin stable. No indication for transfusion    Hypomagnesemia  Give 1g magnesium today      Essential hypertension  Chronic, controlled.  Latest blood pressure and vitals reviewed-   Temp:  [57.2 °F (14 °C)-98.9 °F (37.2 °C)]   Pulse:  [63-90]   Resp:  [13-20]   BP: ()/(50-64)   SpO2:  [93 %-98 %] .   Home meds for hypertension were reviewed and noted below.   Hypertension Medications             amLODIPine (NORVASC) 5 MG tablet Take 1 tablet (5 mg total) by mouth once daily.    carvediloL (COREG) 25 MG tablet Take 1 tablet (25 mg total) by mouth 2 (two) times daily with meals.    furosemide (LASIX) 20 MG tablet Take 1 tablet (20 mg total) by mouth once daily.    hydrALAZINE (APRESOLINE) 50 MG tablet Take 1 tablet (50 mg total) by mouth every  12 (twelve) hours.    isosorbide mononitrate (IMDUR) 120 MG 24 hr tablet Take 1 tablet (120 mg total) by mouth once daily.          While in the hospital, will manage blood pressure as follows; Continue home antihypertensive regimen    Will utilize p.r.n. blood pressure medication only if patient's blood pressure greater than  180/110 and he develops symptoms such as worsening chest pain or shortness of breath.    2/6/22  Has been stable      VTE Risk Mitigation (From admission, onward)         Ordered     Reason for No Pharmacological VTE Prophylaxis  Once        Question:  Reasons:  Answer:  Risk of Bleeding    02/05/22 1830     IP VTE HIGH RISK PATIENT  Once         02/05/22 1830     Place sequential compression device  Until discontinued         02/05/22 1830                Discharge Planning   LINDA:      Code Status: Full Code   Is the patient medically ready for discharge?:     Reason for patient still in hospital (select all that apply): Treatment           Lizy Sutherland NP  Department of Hospital Medicine   [facility] - Med Surg

## 2022-02-06 NOTE — ASSESSMENT & PLAN NOTE
Creatinine not at baseline  Likely dehydrated given decreased appetitie  Slow IVF overnight recheck labs in AM    2/6/22  Gradually improving. 3.4 today.   Continue gentle hydration

## 2022-02-06 NOTE — ASSESSMENT & PLAN NOTE
Creatinine at baseline  Likely dehydrated given decreased appetitie  Slow IVF overnight recheck labs in AM

## 2022-02-06 NOTE — PLAN OF CARE
Problem: Adult Inpatient Plan of Care  Goal: Plan of Care Review  Outcome: Ongoing, Progressing  Goal: Patient-Specific Goal (Individualized)  Outcome: Ongoing, Progressing  Goal: Absence of Hospital-Acquired Illness or Injury  Outcome: Ongoing, Progressing  Goal: Optimal Comfort and Wellbeing  Outcome: Ongoing, Progressing  Goal: Readiness for Transition of Care  Outcome: Ongoing, Progressing     Problem: Fluid and Electrolyte Imbalance (Acute Kidney Injury/Impairment)  Goal: Fluid and Electrolyte Balance  Outcome: Ongoing, Progressing     Problem: Oral Intake Inadequate (Acute Kidney Injury/Impairment)  Goal: Optimal Nutrition Intake  Outcome: Ongoing, Progressing     Problem: Renal Function Impairment (Acute Kidney Injury/Impairment)  Goal: Effective Renal Function  Outcome: Ongoing, Progressing     Problem: Impaired Wound Healing  Goal: Optimal Wound Healing  Outcome: Ongoing, Progressing

## 2022-02-06 NOTE — HOSPITAL COURSE
Kodi Jackson is a 72 year old male who was admitted to [facility] for acute on chronic renal failure related to IVVD. Improved with IV hydration. Noted to have metabolic acidosis suspect related top renal failure improving with sodium bicarbonate. Only admitted to N/V., denies diarrhea. Suspect a viral etiology diet advanced and tolerated well. Ambulatory referral placed for nephrology for CKD. Weakness has also improved. Will continue sodium bicarbonate for metabolic acidosis.

## 2022-02-06 NOTE — SUBJECTIVE & OBJECTIVE
Past Medical History:   Diagnosis Date    Acute on chronic congestive heart failure 1/13/2020    Acute respiratory failure with hypoxia 1/14/2020    Analgesic nephropathy     Anemia     AP (angina pectoris) 1/11/2019    Arthritis     Cancer     colon cancer    Colon polyp     Repeat colonoscopy due in 9/14    COPD exacerbation 2/6/2020    Coronary artery disease     Diverticulosis     colonoscopy 2/21/2014    Dysthymia 2/13/2020    Encounter for blood transfusion     GERD (gastroesophageal reflux disease)     Hemorrhoids     colonoscopy 2/21/2014    Horseshoe kidney     Hyperglycemia 3/17/2014    Hyperlipidemia     Hypertension     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    Late complications of amputation stump     rseolved with further amputation( MRSA then none since 2014)    Lipoma of colon     colonoscopy 2/21/2014    Myocardial infarction     per patient 2000 & 9/2012    Peripheral vascular disease     Phantom limb syndrome     patient reports only intermittent not problematic, not worsening    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    Spinal cord disease     L4L5 disc    Stroke     Tobacco dependence     resolved    Ureteral stent retained        Past Surgical History:   Procedure Laterality Date    ABDOMINAL AORTIC ANEURYSM REPAIR      ABDOMINAL AORTIC ANEURYSM REPAIR  1996/2014    AMPUTATION, LOWER LIMB      AORTA - BILATERAL FEMORAL ARTERY BYPASS GRAFT  2014    Left and right leg    COLONOSCOPY N/A 6/2/2020    Procedure: COLONOSCOPY;  Surgeon: Rosanna Harrington MD;  Location: [facility] ENDO;  Service: Endoscopy;  Laterality: N/A;    COLOSTOMY N/A 6/11/2020    Procedure: CREATION, COLOSTOMY;  Surgeon: Leonardo Yang MD;  Location: [facility] OR;  Service: General;  Laterality: N/A;    CORONARY ANGIOPLASTY WITH STENT PLACEMENT  2000    Three placed in heart    CYSTOSCOPY W/ RETROGRADES Left 5/29/2018    Procedure: CYSTOSCOPY, WITH RETROGRADE PYELOGRAM;  Surgeon: Scooter MENESES  Davin NELSON MD;  Location: [facility] OR;  Service: Urology;  Laterality: Left;    CYSTOSCOPY W/ URETERAL STENT PLACEMENT Left 5/29/2018    Procedure: CYSTOSCOPY, WITH URETERAL STENT INSERTION;  Surgeon: Scooter Jin IV, MD;  Location: [facility] OR;  Service: Urology;  Laterality: Left;    CYSTOSCOPY W/ URETERAL STENT PLACEMENT Left 2/4/2020    Procedure: CYSTOSCOPY, WITH URETERAL STENT INSERTION;  Surgeon: Scooter Jin IV, MD;  Location: [facility] OR;  Service: Urology;  Laterality: Left;    CYSTOSCOPY W/ URETERAL STENT REMOVAL Left 5/29/2018    Procedure: CYSTOSCOPY, WITH URETERAL STENT REMOVAL;  Surgeon: Scooter Jin IV, MD;  Location: [facility] OR;  Service: Urology;  Laterality: Left;    CYSTOSCOPY W/ URETERAL STENT REMOVAL Left 2/4/2020    Procedure: CYSTOSCOPY, WITH URETERAL STENT REMOVAL;  Surgeon: Scooter Jin IV, MD;  Location: [facility] OR;  Service: Urology;  Laterality: Left;    ESOPHAGOGASTRODUODENOSCOPY N/A 6/2/2020    Procedure: EGD (ESOPHAGOGASTRODUODENOSCOPY);  Surgeon: Rosanna Harrington MD;  Location: [facility] ENDO;  Service: Endoscopy;  Laterality: N/A;    FOOT AMPUTATION THROUGH METATARSAL  1996    left    FOOT SURGERY Bilateral 1980's    per patient multiple toe amputations prior to.  partial foot amputation:first great toe then other toes     INJECTION OF ANESTHETIC AGENT INTO TISSUE PLANE DEFINED BY TRANSVERSUS ABDOMINIS MUSCLE N/A 6/11/2020    Procedure: BLOCK, TRANSVERSUS ABDOMINIS PLANE;  Surgeon: Leonardo Yang MD;  Location: [facility] OR;  Service: General;  Laterality: N/A;    KIDNEY SURGERY  2014    per patient separation of horseshoe kidney @ time of AAA repair    LEFT HEART CATHETERIZATION Left 3/7/2019    Procedure: CATHETERIZATION, HEART, LEFT;  Surgeon: Adriel Boone MD;  Location: [facility] CATH LAB;  Service: Cardiology;  Laterality: Left;  630 admit for IV hydration  10am start    LUNG LOBECTOMY Right 1970s    per patient not cancer    LYSIS OF ADHESIONS N/A 6/11/2020     Procedure: LYSIS, ADHESIONS;  Surgeon: Leonardo Yang MD;  Location: [facility] OR;  Service: General;  Laterality: N/A;    OMENTECTOMY N/A 6/11/2020    Procedure: OMENTECTOMY;  Surgeon: Leonardo Yang MD;  Location: [facility] OR;  Service: General;  Laterality: N/A;    right below knee amputation  2009 (approx)    SMALL INTESTINE SURGERY  2014    per patient partial @ time of aaa repair  not small bowel - large bowel bowel compromised bythtwe AAAbowel    SUBTOTAL COLECTOMY N/A 6/11/2020    Procedure: COLECTOMY, PARTIAL;  Surgeon: Leonardo Yang MD;  Location: [facility] OR;  Service: General;  Laterality: N/A;    TONSILLECTOMY  1955 aprox    URETERAL STENT PLACEMENT Left     annually replaced since 2012 or so  Dr Jin       Review of patient's allergies indicates:   Allergen Reactions    Morphine Itching       Current Facility-Administered Medications on File Prior to Encounter   Medication    acetaminophen tablet 650 mg    albuterol inhaler 2 puff    diphenhydrAMINE injection 25 mg    EPINEPHrine (EPIPEN) 0.3 mg/0.3 mL pen injection 0.3 mg    methylPREDNISolone sodium succinate injection 40 mg    ondansetron disintegrating tablet 4 mg    sodium chloride 0.9% 500 mL flush bag    sodium chloride 0.9% flush 10 mL     Current Outpatient Medications on File Prior to Encounter   Medication Sig    amLODIPine (NORVASC) 5 MG tablet Take 1 tablet (5 mg total) by mouth once daily.    ascorbic acid, vitamin C, (VITAMIN C) 500 MG tablet Take 500 mg by mouth once daily.    aspirin (ECOTRIN) 81 MG EC tablet Take 81 mg by mouth once daily.    atorvastatin (LIPITOR) 40 MG tablet TAKE 1 TABLET EVERY DAY (Patient taking differently: every evening.)    carvediloL (COREG) 25 MG tablet Take 1 tablet (25 mg total) by mouth 2 (two) times daily with meals.    cetirizine (ZYRTEC) 10 MG tablet Take 10 mg by mouth daily as needed.    cholecalciferol, vitamin D3, (VITAMIN D3) 50 mcg (2,000 unit) Cap Take 1 capsule by  mouth once daily.    clopidogreL (PLAVIX) 75 mg tablet TAKE 1 TABLET (75 MG TOTAL) BY MOUTH ONCE DAILY.    furosemide (LASIX) 20 MG tablet Take 1 tablet (20 mg total) by mouth once daily.    hydrALAZINE (APRESOLINE) 50 MG tablet Take 1 tablet (50 mg total) by mouth every 12 (twelve) hours.    isosorbide mononitrate (IMDUR) 120 MG 24 hr tablet Take 1 tablet (120 mg total) by mouth once daily.    omeprazole (PRILOSEC) 20 MG capsule TAKE 1 CAPSULE TWICE DAILY    polyethylene glycol (GLYCOLAX) 17 gram PwPk Take 17 g by mouth once daily.    ranolazine (RANEXA) 1,000 mg Tb12 Take 1 tablet (1,000 mg total) by mouth 2 (two) times daily.    [DISCONTINUED] famotidine (PEPCID) 20 MG tablet Take 1 tablet (20 mg total) by mouth 2 (two) times daily.    [DISCONTINUED] mupirocin (BACTROBAN) 2 % ointment Apply topically 3 (three) times daily.    [DISCONTINUED] nitroglycerin (NITROSTAT) 0.6 MG Subl Place 1 tablet (0.6 mg total) under the tongue every 5 (five) minutes as needed (max 3/ per episode).    [DISCONTINUED] OXYGEN-AIR DELIVERY SYSTEMS MISC by Misc.(Non-Drug; Combo Route) route.    [DISCONTINUED] triamcinolone acetonide 0.1% (KENALOG) 0.1 % cream Apply topically 2 (two) times daily.     Family History     Problem Relation (Age of Onset)    COPD Mother    Cancer Mother    Diabetes Daughter    Heart disease Father        Tobacco Use    Smoking status: Former Smoker     Packs/day: 1.00     Years: 15.00     Pack years: 15.00     Quit date: 2009     Years since quittin.1    Smokeless tobacco: Never Used   Substance and Sexual Activity    Alcohol use: No    Drug use: No     Comment: Is on prescription opiod, no non prescribed use    Sexual activity: Not Currently     Partners: Female     Review of Systems   Constitutional: Positive for activity change, appetite change, chills, fatigue and fever.   Respiratory: Negative for cough, shortness of breath and wheezing.    Cardiovascular: Negative for chest pain,  palpitations and leg swelling.   Gastrointestinal: Positive for nausea and vomiting. Negative for abdominal pain, constipation and diarrhea.   Genitourinary: Negative for difficulty urinating and urgency.   Musculoskeletal: Positive for back pain, gait problem and myalgias.   Skin: Positive for wound (DTI right BKA stump).   Neurological: Positive for dizziness, weakness, light-headedness and headaches.   Psychiatric/Behavioral: Negative for behavioral problems and decreased concentration. The patient is not nervous/anxious.    All other systems reviewed and are negative.    Objective:     Vital Signs (Most Recent):  Temp: 98.9 °F (37.2 °C) (02/05/22 1454)  Pulse: 66 (02/05/22 1718)  Resp: 17 (02/05/22 1632)  BP: (!) 115/57 (02/05/22 1718)  SpO2: 98 % (02/05/22 1718) Vital Signs (24h Range):  Temp:  [98.9 °F (37.2 °C)] 98.9 °F (37.2 °C)  Pulse:  [63-70] 66  Resp:  [13-20] 17  SpO2:  [96 %-98 %] 98 %  BP: ()/(50-58) 115/57     Weight: 86.9 kg (191 lb 9.3 oz)  Body mass index is 25.28 kg/m².    Physical Exam  Constitutional:       Appearance: Normal appearance.   HENT:      Head: Normocephalic and atraumatic.      Nose: Nose normal.      Mouth/Throat:      Comments: Pale membranes  Eyes:      Extraocular Movements: Extraocular movements intact.      Conjunctiva/sclera: Conjunctivae normal.   Cardiovascular:      Rate and Rhythm: Normal rate and regular rhythm.      Pulses: Normal pulses.      Heart sounds: Normal heart sounds. No murmur heard.      Pulmonary:      Effort: Pulmonary effort is normal. No respiratory distress.      Breath sounds: Normal breath sounds. No wheezing.   Abdominal:      General: Bowel sounds are normal. There is no distension.      Tenderness: There is no abdominal tenderness.      Comments: Colostomy to right abdomen   Musculoskeletal:         General: Normal range of motion.      Cervical back: Normal range of motion.      Comments: Right BKA, left partial foot amputation   Skin:      Comments: DTI right stump   Neurological:      Mental Status: He is alert and oriented to person, place, and time.   Psychiatric:         Mood and Affect: Mood normal.         Behavior: Behavior normal.             Significant Labs: All pertinent labs within the past 24 hours have been reviewed.  Results for orders placed or performed during the hospital encounter of 02/05/22   CBC W/ AUTO DIFFERENTIAL   Result Value Ref Range    WBC 7.64 3.90 - 12.70 K/uL    RBC 2.83 (L) 4.60 - 6.20 M/uL    Hemoglobin 8.5 (L) 14.0 - 18.0 g/dL    Hematocrit 26.5 (L) 40.0 - 54.0 %    MCV 94 82 - 98 fL    MCH 30.0 27.0 - 31.0 pg    MCHC 32.1 32.0 - 36.0 g/dL    RDW 14.6 (H) 11.5 - 14.5 %    Platelets 115 (L) 150 - 450 K/uL    MPV 10.3 9.2 - 12.9 fL    Immature Granulocytes 1.0 (H) 0.0 - 0.5 %    Gran # (ANC) 6.6 1.8 - 7.7 K/uL    Immature Grans (Abs) 0.08 (H) 0.00 - 0.04 K/uL    Lymph # 0.3 (L) 1.0 - 4.8 K/uL    Mono # 0.7 0.3 - 1.0 K/uL    Eos # 0.0 0.0 - 0.5 K/uL    Baso # 0.01 0.00 - 0.20 K/uL    nRBC 0 0 /100 WBC    Gran % 86.5 (H) 38.0 - 73.0 %    Lymph % 3.4 (L) 18.0 - 48.0 %    Mono % 8.9 4.0 - 15.0 %    Eosinophil % 0.1 0.0 - 8.0 %    Basophil % 0.1 0.0 - 1.9 %    Platelet Estimate Appears normal     Aniso Slight     Poik Slight     Hypo Occasional     Differential Method Automated    Comp. Metabolic Panel   Result Value Ref Range    Sodium 130 (L) 136 - 145 mmol/L    Potassium 4.0 3.5 - 5.1 mmol/L    Chloride 103 95 - 110 mmol/L    CO2 16 (L) 23 - 29 mmol/L    Glucose 135 (H) 70 - 110 mg/dL    BUN 47 (H) 8 - 23 mg/dL    Creatinine 3.9 (H) 0.5 - 1.4 mg/dL    Calcium 8.0 (L) 8.7 - 10.5 mg/dL    Total Protein 6.8 6.0 - 8.4 g/dL    Albumin 2.8 (L) 3.5 - 5.2 g/dL    Total Bilirubin 0.8 0.1 - 1.0 mg/dL    Alkaline Phosphatase 134 55 - 135 U/L    AST 19 10 - 40 U/L    ALT 21 10 - 44 U/L    Anion Gap 11 8 - 16 mmol/L    eGFR if African American 17 (A) >60 mL/min/1.73 m^2    eGFR if non African American 14 (A) >60 mL/min/1.73 m^2    Lipase   Result Value Ref Range    Lipase 15 4 - 60 U/L   Lactic acid, plasma   Result Value Ref Range    Lactate (Lactic Acid) 1.0 0.5 - 2.2 mmol/L   Magnesium   Result Value Ref Range    Magnesium 1.5 (L) 1.6 - 2.6 mg/dL       Significant Imaging: I have reviewed all pertinent imaging results/findings within the past 24 hours.   Imaging Results          X-Ray Abdomen Flat And Erect (Final result)  Result time 02/05/22 16:16:55    Final result by Severo Gonsalves MD (02/05/22 16:16:55)                 Impression:      No definite acute abnormality.      Electronically signed by: Severo Gonsalves  Date:    02/05/2022  Time:    16:16             Narrative:    EXAMINATION:  XR ABDOMEN FLAT AND ERECT    CLINICAL HISTORY:  Nausea with vomiting, unspecified    TECHNIQUE:  Flat and erect AP views of the abdomen were performed.    COMPARISON:  None    FINDINGS:  No air-fluid levels on upright radiograph.  No dilated loops of small bowel on supine radiograph.    Stool burden within the colon is within normal limits.    No calculi overlie the renal shadows.    Osseous structures are grossly intact.  Lung bases are clear.

## 2022-02-07 NOTE — PLAN OF CARE
[facility] - Med S[facility]  Discharge Final Note    Primary Care Provider: Norma Nuñez MD    Expected Discharge Date: 2/7/2022    Final Discharge Note (most recent)     Final Note - 02/07/22 1255        Final Note    Assessment Type Final Discharge Note     Anticipated Discharge Disposition Home or Self Care     Hospital Resources/Appts/Education Provided Provided patient/caregiver with written discharge plan information;Appointments scheduled and added to AVS                 Important Message from Medicare             Contact Info     Nephrology         Next Steps: Follow up in 2 week(s)

## 2022-02-07 NOTE — PLAN OF CARE
Problem: Adult Inpatient Plan of Care  Goal: Plan of Care Review  Outcome: Ongoing, Progressing  Goal: Patient-Specific Goal (Individualized)  Outcome: Ongoing, Progressing  Goal: Absence of Hospital-Acquired Illness or Injury  Outcome: Ongoing, Progressing  Goal: Optimal Comfort and Wellbeing  Outcome: Ongoing, Progressing  Goal: Readiness for Transition of Care  Outcome: Ongoing, Progressing     Problem: Fluid and Electrolyte Imbalance (Acute Kidney Injury/Impairment)  Goal: Fluid and Electrolyte Balance  Outcome: Ongoing, Progressing     Problem: Oral Intake Inadequate (Acute Kidney Injury/Impairment)  Goal: Optimal Nutrition Intake  Outcome: Ongoing, Progressing     Problem: Renal Function Impairment (Acute Kidney Injury/Impairment)  Goal: Effective Renal Function  Outcome: Ongoing, Progressing     Problem: Impaired Wound Healing  Goal: Optimal Wound Healing  Outcome: Ongoing, Progressing     Problem: Skin Injury Risk Increased  Goal: Skin Health and Integrity  Outcome: Ongoing, Progressing

## 2022-02-07 NOTE — PLAN OF CARE
AAOx4. Pt pain is being managed on going. Spouse @ bedside. Pt remained free from fall and injury. No sign of any distress noted. Safety precautions alert. Pt asked to call for assistance if needed. IV access clean dry and intact infusing @ 50ml/hr. Normal sinus rhythm on tele monitor #[id number].  Chart checked reviewed. VSS. Will continue to monitor on going.

## 2022-02-07 NOTE — DISCHARGE SUMMARY
[facility] Medicine  Discharge Summary      Patient Name: Kodi Franco  MRN: [id number]  Patient Class: OP- Observation  Admission Date: 2/5/2022  Hospital Length of Stay: 0 days  Discharge Date and Time:  02/07/2022 3:42 PM  Attending Physician: No att. providers found   Discharging Provider: Lizy Sutherland NP  Primary Care Provider: Norma Nuñez MD      HPI:   Kodi Franco is a 72 y.o. male with PMHx of CHF, COPD, HTN, HLD, PAD, with colostomy who presented to the Emergency Department today with c/o generalized weakness for the last 7 days. It has been constant and of moderate severity.  Aggravating factors include none. Alleviating factors include none. Associated symptoms include nausea, emesis, loss of appetite, fatigue, chills. In Emergency Department noted to have MARCELA with Creat 3.9 BUN 39. KUB WNL.   Patient placed in observation for further monitoring.  Full Code. SDM is wife.          * No surgery found *      Hospital Course:   Kodi Jackson is a 72 year old male who was admitted to [facility] for acute on chronic renal failure related to IVVD. Improved with IV hydration. Noted to have metabolic acidosis suspect related top renal failure improving with sodium bicarbonate. Only admitted to N/V., denies diarrhea. Suspect a viral etiology diet advanced and tolerated well. Ambulatory referral placed for nephrology for CKD. Weakness has also improved. Will continue sodium bicarbonate for metabolic acidosis.        Goals of Care Treatment Preferences:  Code Status: Full Code      Consults:     No new Assessment & Plan notes have been filed under this hospital service since the last note was generated.  Service: Hospital Medicine    Final Active Diagnoses:    Diagnosis Date Noted POA    PRINCIPAL PROBLEM:  Acute renal failure superimposed on stage 4 chronic kidney disease [N17.9, N18.4] 09/04/2014 Yes    Hyponatremia [E87.1] 02/06/2022 Yes    Metabolic acidosis  [E87.2] 02/06/2022 Yes    LISA (iron deficiency anemia) [D50.9] 06/02/2020 Yes    Hypomagnesemia [E83.42] 03/27/2019 Yes    Essential hypertension [I10] 06/18/2013 Yes     Chronic      Problems Resolved During this Admission:       Discharged Condition: stable    Disposition: Home or Self Care    Follow Up:   Follow-up Information     Nephrology  In 2 weeks.                     Patient Instructions:      Ambulatory referral/consult to Outpatient Case Management   Referral Priority: Routine Referral Type: Consultation   Referral Reason: Specialty Services Required   Number of Visits Requested: 1     Ambulatory referral/consult to Nephrology   Standing Status: Future   Referral Priority: Routine Referral Type: Consultation   Referral Reason: Specialty Services Required   Requested Specialty: Nephrology   Number of Visits Requested: 1       Significant Diagnostic Studies: Labs:   CMP   Recent Labs   Lab 02/06/22  0644 02/07/22  0809   * 132*   K 3.7 3.5    105   CO2 14* 16*   * 170*   BUN 46* 43*   CREATININE 3.4* 2.8*   CALCIUM 7.6* 7.8*   PROT 6.1  --    ALBUMIN 2.7*  --    BILITOT 0.9  --    ALKPHOS 137*  --    AST 44*  --    ALT 22  --    ANIONGAP 11 11   ESTGFRAFRICA 20* 25*   EGFRNONAA 17* 21*    and CBC   Recent Labs   Lab 02/06/22  0644   WBC 6.26   HGB 8.6*   HCT 25.7*   *       Pending Diagnostic Studies:     None         Medications:  Reconciled Home Medications:      Medication List      START taking these medications    sodium bicarbonate 650 MG tablet  Take 1 tablet (650 mg total) by mouth 2 (two) times daily.     traMADoL 50 mg tablet  Commonly known as: ULTRAM  Take 1 tablet (50 mg total) by mouth every 12 (twelve) hours as needed for Pain.        CHANGE how you take these medications    atorvastatin 40 MG tablet  Commonly known as: LIPITOR  TAKE 1 TABLET EVERY DAY  What changed:   · how much to take  · how to take this  · when to take this        CONTINUE taking these  medications    amLODIPine 5 MG tablet  Commonly known as: NORVASC  Take 1 tablet (5 mg total) by mouth once daily.     aspirin 81 MG EC tablet  Commonly known as: ECOTRIN  Take 81 mg by mouth once daily.     carvediloL 25 MG tablet  Commonly known as: COREG  Take 1 tablet (25 mg total) by mouth 2 (two) times daily with meals.     cetirizine 10 MG tablet  Commonly known as: ZYRTEC  Take 10 mg by mouth daily as needed.     clopidogreL 75 mg tablet  Commonly known as: PLAVIX  TAKE 1 TABLET (75 MG TOTAL) BY MOUTH ONCE DAILY.     furosemide 20 MG tablet  Commonly known as: LASIX  Take 1 tablet (20 mg total) by mouth once daily.     hydrALAZINE 50 MG tablet  Commonly known as: APRESOLINE  Take 1 tablet (50 mg total) by mouth every 12 (twelve) hours.     isosorbide mononitrate 120 MG 24 hr tablet  Commonly known as: IMDUR  Take 1 tablet (120 mg total) by mouth once daily.     omeprazole 20 MG capsule  Commonly known as: PRILOSEC  TAKE 1 CAPSULE TWICE DAILY     polyethylene glycol 17 gram Pwpk  Commonly known as: GLYCOLAX  Take 17 g by mouth once daily.     ranolazine 1,000 mg Tb12  Commonly known as: RANEXA  Take 1 tablet (1,000 mg total) by mouth 2 (two) times daily.     VITAMIN C 500 MG tablet  Generic drug: ascorbic acid (vitamin C)  Take 500 mg by mouth once daily.     VITAMIN D3 50 mcg (2,000 unit) Cap  Generic drug: cholecalciferol (vitamin D3)  Take 1 capsule by mouth once daily.        STOP taking these medications    OXYGEN-AIR DELIVERY SYSTEMS MISC            Indwelling Lines/Drains at time of discharge:   Lines/Drains/Airways     Drain                 Colostomy 06/11/20 1209 Transverse  days                Time spent on the discharge of patient: >35 minutes         Lizy Sutherland NP  Department of Hospital Medicine  [facility]

## 2022-02-07 NOTE — PLAN OF CARE
Discharge education given. Pt verbalized an understanding. IV removed, catheter intact. Pt to be discharged with personal belongings. Pt wife present to bring him home.

## 2022-02-07 NOTE — PLAN OF CARE
[facility]  Initial Discharge Assessment       Primary Care Provider: Norma Nuñez MD    Admission Diagnosis: Weakness [R53.1]  Nausea & vomiting [R11.2]  Intravascular volume depletion [E86.1]  Acute on chronic anemia [D64.9]  Acute renal failure superimposed on stage 3 chronic kidney disease, unspecified acute renal failure type, unspecified whether stage 3a or 3b CKD [N17.9, N18.30]    Admission Date: 2/5/2022  Expected Discharge Date:     Discharge Barriers Identified: None    Payor: [employer] MANAGED MEDICARE / Plan: [employer] MEDICARE HMO / Product Type: Capitation /     Extended Emergency Contact Information  Primary Emergency Contact: Laury Ribeiro  Address: [address] [place] [address] [facility]  Home Phone: [phone]  Mobile Phone: [phone]  Relation: Spouse    Discharge Plan A: Home with family         [facility] - [place], [place] - [address]   [address] [address]  Phone: [phone] Fax: [phone]    [id number] - [place], [place] - [address] AT [facility] [address]  [address]  [place] [address]  Phone: [phone] Fax: [phone]      Initial Assessment (most recent)     Adult Discharge Assessment - 02/07/22 1012        Discharge Assessment    Assessment Type Discharge Planning Assessment     Confirmed/corrected address, phone number and insurance Yes     Confirmed Demographics Correct on Facesheet     Source of Information patient     Does patient/caregiver understand observation status Yes     Communicated LINDA with patient/caregiver Date not available/Unable to determine     Reason For Admission Acute renal failure superimposed on stage 4 chronic kidney disease     Lives With spouse     Facility Arrived From: home     Do you expect to return to your current living situation? Yes     Do you have help at home or someone to help you manage your care at  home? Yes     Who are your caregiver(s) and their phone number(s)? Laury Isaacs (wife) [phone]     Prior to hospitilization cognitive status: Alert/Oriented     Current cognitive status: Alert/Oriented     Walking or Climbing Stairs Difficulty ambulation difficulty, requires equipment     Mobility Management wheelchair     Dressing/Bathing Difficulty none     Home Accessibility wheelchair accessible     Home Layout Able to live on 1st floor     Equipment Currently Used at Home oxygen;wheelchair     Readmission within 30 days? No     Patient currently being followed by outpatient case management? No     Do you currently have service(s) that help you manage your care at home? No     Do you take prescription medications? Yes     Do you have prescription coverage? Yes     Do you have any problems affording any of your prescribed medications? No     Is the patient taking medications as prescribed? yes     Who is going to help you get home at discharge? Laury Isaacs (wife) [phone]     How do you get to doctors appointments? car, drives self;family or friend will provide     Are you on dialysis? No     Do you take coumadin? No     Discharge Plan A Home with family     DME Needed Upon Discharge  none     Discharge Plan discussed with: Patient;Spouse/sig other     Discharge Barriers Identified None        Relationship/Environment    Name(s) of Who Lives With Patient Laury Isaacs (wife) [phone]                  Case management met with pt at the bedside to assess for discharge needs and explained case management role.CM provided a transitional care folder, information on advanced directives, information on pharmacy bedside delivery, and discharge planning begins on admission with contact information for any needs/questions.

## 2022-02-07 NOTE — CONSULTS
Consulted on Mr. Franco due to present on admission wounds. He is awake and alert, denies pain. RUQ ostomy is noted with soft brown stool in pouch, extra pouches left at bedside.  Right BKA stump with dark scab/callus noted, easily removed while cleansing revealing newly epithelialized pink skin underneath. Foam dressing applied to protect.  Left distal TMA stump ulceration noted, measures 1x1.5x0.2cm, moist red wound bed, small amount serous drainage. Cleansed with saline and patted dry. aquacel ag advantage and foam dressing applied. Discussed care with patient and wife, recommend OP follow up with podiatry.

## 2022-02-14 ENCOUNTER — TELEPHONE (OUTPATIENT)
Dept: FAMILY MEDICINE | Facility: CLINIC | Age: 72
End: 2022-02-14
Payer: MEDICARE

## 2022-02-14 NOTE — TELEPHONE ENCOUNTER
Courtney called to confirm appointment on Friday 2/11.   They advised her that patient had passed away.

## 2022-04-06 NOTE — PROGRESS NOTES
Subjective:       Patient ID: Kodi Ribeiro is a 67 y.o. male.    Chief Complaint: Abscess (to right upper leg )    Abscess   Chronicity:  New  Onset:  2 days ago  Progression Since Onset: gradually worsening  Location:  Leg  Associated Symptoms: no fever, no chills, no sweats  Characteristics: draining, painful, redness and swelling    Treatments Tried:  Nothing  Relieved by:  Nothing  Worsened by:  Draining/squeezing    Review of Systems   Constitutional: Negative for chills, diaphoresis, fatigue and fever.   Skin: Positive for color change and wound.       Objective:      Physical Exam   Constitutional: He is oriented to person, place, and time. He appears well-developed and well-nourished.  Non-toxic appearance. He does not have a sickly appearance. He does not appear ill. No distress.   Neurological: He is alert and oriented to person, place, and time.   Skin: Skin is warm and dry. He is not diaphoretic.   Right lateral thigh- no drainage, no fluctuance, increased warmth, tender        Assessment:       1. Abscess of leg, right            Plan:   Kodi was seen today for abscess.    Diagnoses and all orders for this visit:    Abscess of leg, right  -     mupirocin (BACTROBAN) 2 % ointment; Apply topically 3 (three) times daily.  -     Aerobic culture  -     doxycycline (VIBRAMYCIN) 100 MG Cap; Take 1 capsule (100 mg total) by mouth every 12 (twelve) hours. Note to Pharmacy: Can substitute for Monodox if needed    -     Patient counseled on keeping area clean and dry, Cover if needed, Apply warm compresses to the abscess 3-4 times a day for 15-20 minutes at a time.  Observe for worsening  symptoms to include, increase in redness, increase in swelling, red streaking, fever, increase in pain, etc or if no improvement in 3 days. If any of these should occur, seek care immediately  -     Diagnosis, treatment, AVS reviewed with patient  -     Patient understands and agrees with plan      
71

## 2022-12-18 NOTE — ASSESSMENT & PLAN NOTE
2/2  Acute decompensation noted overnight   IVF discontinued   Pt had received Lasix 60 mg IV x 1 dose   Clinical improvement noted with diuresis   Monitor volume status   Monitor renal indices   2/3  Start gentle diuresis with oral Furosemide 20 mg daily   2/4  Additional Lasix 20 mg IV x 1 dose given today due to worsening hypoxia and increased O2 demand   Continue BB  ARB discontinued   Add Hydralazine with nitroglycerin.   2/5-  BNP elevated noted . Continue Lasix IV until achieve euvolemia    Bedside report received from day shift nurse.  Pt A&Ox4. Armenian primary language.  Tolerating Full liquid diet, denies n/v. Hypoactive bowel sounds, passing flatus, LBM 12/17/22. IV access through 20g RFA that is SL.  MLI w/ staples, CATARINO. Bruising to L neck and flank. Large bruising to R hip, outlined to watch for growth.   Saturating >90% on 2L NC.  Pt ambulates SBA.  Pain is controlled through medication orders. Updated on plan of care. Safety education provided. Bed locked in low. Call light within reach. Rounding in place.

## 2023-03-08 NOTE — HOSPITAL COURSE
Pt experienced Acute hypoxic respiratory failure at home and SOB. Initially placed on BiPAP, given lasix then oxygen weaned to nasal cannula. Pt has a documented diastolic heart failure and CXR indicates pulmonary vascular congestion and diuresis with IV lasix initiated. Repeat 2 D ECHO pending and Cardiology consult pending. According to patient he has CAD which could not be intervened. He is followed by Cardiology in [place] and [place]. Serial troponin normal. 2 D ECHO confirmed no wall motion abnormalities, preserved LVSF and indeterminate Lv diastolic function. Cardiology agreed with lasix diuresis. Also, a carotid US was performed with the following results: 20-50% stenosis on the right, 0-20% stenosis on the left. Also, Pulmonology saw the patient and diagnosed presumed PNA and Azithromycin was started. The next day, pt's SOB was much improved. He was cleared for discharge by Cardiology. He was prescribed lasix 20 mg daily and Azithromycin. Pt was advised to follow up with PCP and Cardiology. He was seen and examined and determined to be safe and stable for discharge.    good minus

## 2024-04-01 NOTE — PROGRESS NOTES
No answer     ----- Message from Camelia López LPN sent at 4/1/2024  2:56 PM CDT -----  Regarding: FW: appt  Contact: @ [phone]    ----- Message -----  From: Lissette Beach  Sent: 4/1/2024   2:43 PM CDT  To: [facility] Nephro Clinical Staff  Subject: appt                                             Pt requesting a appointment for the following stage 3 CKD no soon dates  ...Please call and adv @ [phone]         Transitional Care Note  Subjective:       Patient ID: Kodi Ribeiro is a 70 y.o. male.  Chief Complaint: Follow-up (6 month )    Family and/or Caretaker present at visit?  Yes.  Diagnostic tests reviewed/disposition: No diagnosic tests pending after this hospitalization.  Disease/illness education: y  Home health/community services discussion/referrals: Patient does not have home health established from hospital visit.  They do not need home health.  If needed, we will set up home health for the patient.   Establishment or re-establishment of referral orders for community resources: No other necessary community resources.   Discussion with other health care providers: No discussion with other health care providers necessary.   Patient is status post hospital discharge  He was hospitalized with what seemed like pneumonia for which was treated with appropriate antibiotics.  He was also treated for diastolic heart failure with IV diuresis which to which he responded well.  He is feeling lot better his breathing is back to normal he was discharged on a oxygen at home but currently not using any oxygen no fever cough.  Heart failure is compensated    Review of Systems   Constitutional: Negative for activity change, chills, fatigue, fever and unexpected weight change.   HENT: Negative for congestion, ear discharge, ear pain, hearing loss, postnasal drip and rhinorrhea.    Eyes: Negative for pain and visual disturbance.   Respiratory: Negative for cough, chest tightness and shortness of breath.    Cardiovascular: Negative for chest pain and palpitations.   Gastrointestinal: Negative for abdominal pain, diarrhea and vomiting.   Endocrine: Negative for heat intolerance.   Genitourinary: Negative for dysuria, flank pain, frequency and hematuria.   Musculoskeletal: Negative for back pain, gait problem and neck pain.   Skin: Negative for color change and rash.   Neurological: Negative for dizziness, tremors, seizures,  numbness and headaches.   Psychiatric/Behavioral: Negative for agitation, hallucinations, self-injury, sleep disturbance and suicidal ideas. The patient is not nervous/anxious.        Objective:      Physical Exam   Constitutional: He is oriented to person, place, and time. He appears well-developed.   HENT:   Mouth/Throat: Oropharynx is clear and moist.   Eyes: Pupils are equal, round, and reactive to light. Conjunctivae are normal.   Neck: Normal range of motion. Neck supple.   Cardiovascular: Normal rate, regular rhythm and normal heart sounds.   No murmur heard.  Pulmonary/Chest: Effort normal and breath sounds normal. No respiratory distress. He has no wheezes. He has no rales. He exhibits no tenderness.   Abdominal: Soft. He exhibits no distension and no mass. There is no tenderness. There is no guarding.   Musculoskeletal: He exhibits no edema or tenderness.   Lymphadenopathy:     He has no cervical adenopathy.   Neurological: He is alert and oriented to person, place, and time. He has normal reflexes.   Skin: Skin is warm and dry.   Psychiatric: He has a normal mood and affect. His behavior is normal. Judgment and thought content normal.       Assessment:       1. Hospital discharge follow-up    2. Pneumonia due to infectious organism, unspecified laterality, unspecified part of lung    3. Acute diastolic congestive heart failure        Plan:         doing well.  Patient's discharge summary reviewed continue current plan  Significant time spent discussing patient is plan of care and reviewing his chart.

## 2024-07-29 NOTE — ASSESSMENT & PLAN NOTE
1. CKD stage 4 :  Patient baseline serum creatinine fluctuates between 2 and 3 mg/dL, CT scan of the abdomen shows mild hydronephrosis on the left side, patient has history of ureteric stricture and stent placement on that side, also has nonobstructing stones bilaterally, monitor renal function closely, avoid ACE inhibitors/ARB/NSAIDs at this time, continue gentle hydration, creatinine has now declined to 1.4.    2.  Hypertension - has been fluctuating. Make adjustments as needed.    3.  Small bowel obstruction - management per General surgery    4.  Anemia - multifactorial, monitor hemoglobin, PRBC transfusion when indicated.     5.  Electrolytes: Borderline hyperphosphatemia. Monitor.        25

## (undated) DEVICE — SEE MEDLINE ITEM 152622

## (undated) DEVICE — SHUNT 3.5MMX5MM
Type: IMPLANTABLE DEVICE | Site: CAROTID | Status: NON-FUNCTIONAL
Removed: 2017-08-18

## (undated) DEVICE — GAUZE SPONGE 4X4 12PLY

## (undated) DEVICE — APPLICATOR CHLORAPREP ORN 26ML

## (undated) DEVICE — CATH 5FR OPEN END URETERAL

## (undated) DEVICE — SUT 2/0 30IN SILK BLK BRAI

## (undated) DEVICE — GLOVE SURGICAL LATEX SZ 8

## (undated) DEVICE — APPLIER CLIP LIAGCLIP 9.375IN

## (undated) DEVICE — PACK DRAPE PERI/GYN TIBURON

## (undated) DEVICE — COVER OVERHEAD SURG LT BLUE

## (undated) DEVICE — CLOSURE SKIN STERI STRIP 1/2X4

## (undated) DEVICE — SKIN MARKER DEVON 160

## (undated) DEVICE — STAPLER SKIN PROXIMATE WIDE

## (undated) DEVICE — SUT 1 48IN PDS II VIO MONO

## (undated) DEVICE — GOWN SMARTGOWN LVL4 X-LONG XL

## (undated) DEVICE — CONTAINER SPECIMEN STRL 4OZ

## (undated) DEVICE — SET IRRIGATION WARMING NORMAL

## (undated) DEVICE — SEE MEDLINE ITEM 157185

## (undated) DEVICE — HEMOSTAT SURGICEL 4X8IN

## (undated) DEVICE — ADHESIVE MASTISOL VIAL 48/BX

## (undated) DEVICE — UROVIEW 2600/2800

## (undated) DEVICE — SEE MEDLINE ITEM 157148

## (undated) DEVICE — SOL IRR NACL .9% 3000ML

## (undated) DEVICE — SYR ONLY LUER LOCK 20CC

## (undated) DEVICE — SEE MEDLINE ITEM 154981

## (undated) DEVICE — SEE MEDLINE ITEM 157027

## (undated) DEVICE — SUT VICRYL CTD 2-0 GI 27 SH

## (undated) DEVICE — GLOVE SURG BIOGEL LATEX SZ 7.5

## (undated) DEVICE — NDL SAFETY 22G X 1.5 ECLIPSE

## (undated) DEVICE — SOL WATER STRL IRR 1000ML

## (undated) DEVICE — SUT SILK 3-0 STRANDS 30IN

## (undated) DEVICE — Device

## (undated) DEVICE — SUT 6/0 18 PROLENE C-1

## (undated) DEVICE — SEE MEDLINE ITEM 152487

## (undated) DEVICE — BOOT SUTURE AID

## (undated) DEVICE — DEV-O-LOOPS MINI BLUE

## (undated) DEVICE — PENCIL ROCKER SWITCH 10FT CORD

## (undated) DEVICE — SOL NS 1000CC

## (undated) DEVICE — TAPE MEDIPORE 4IN X 2YDS

## (undated) DEVICE — SEE MEDLINE ITEM 157117

## (undated) DEVICE — RELOAD PROXIMATE CUT BLUE 75MM

## (undated) DEVICE — SYR 10CC LUER LOCK

## (undated) DEVICE — GAUZE SPONGE PEANUT STRL

## (undated) DEVICE — SET IRR URLGY 2LINE UNIV SPIKE

## (undated) DEVICE — STAPLER SKIN PROXIMATE REG

## (undated) DEVICE — SEE MEDLINE ITEM 157194

## (undated) DEVICE — WIRE GUIDE 0.038OLD

## (undated) DEVICE — DEVICE ENSEAL X1 LARGE JAW

## (undated) DEVICE — SPONGE LAP 18X18 PREWASHED

## (undated) DEVICE — TAPE SURG MEDIPORE 6X72IN

## (undated) DEVICE — ELECTRODE REM PLYHSV RETURN 9

## (undated) DEVICE — SUT VICRYL PLUS 4-0 P3 18IN

## (undated) DEVICE — GLOVE PROTEXIS HYDROGEL SZ8

## (undated) DEVICE — SUT SILK 3-0 SH 18IN BLACK

## (undated) DEVICE — SUT SILK 2-0 STRANDS 30IN

## (undated) DEVICE — SUT PROLENE 6-0 BV-1

## (undated) DEVICE — UNDERGLOVES BIOGEL PI SIZE 8

## (undated) DEVICE — SUT 7/0 18IN PROLENE BL MO

## (undated) DEVICE — UNDERGLOVES BIOGEL PI SIZE 7.5

## (undated) DEVICE — DRESSING TELFA STRL 4X3 LF

## (undated) DEVICE — DRAPE MOBILE C-ARM

## (undated) DEVICE — SET EXTENSION STERILE 30IN

## (undated) DEVICE — MANIFOLD 4 PORT

## (undated) DEVICE — SUT VICRYL PLUS 3-0 SH 18IN

## (undated) DEVICE — APPLIER LIGACLIP SM 9.38IN

## (undated) DEVICE — SOL NACL 0.9% INJ 500ML BG

## (undated) DEVICE — BAG POSTOP W/ACCESS CUT TO FIT

## (undated) DEVICE — ELECTRODE BLD EXT 6.50 ST DISP

## (undated) DEVICE — SYR 30CC LUER LOCK

## (undated) DEVICE — SHEET THYROID W/ISO-BAC

## (undated) DEVICE — SEE MEDLINE ITEM 157206

## (undated) DEVICE — CUTTER PROXIMATE BLUE 75MM

## (undated) DEVICE — SPONGE DERMACEA 4X4IN 12PLY

## (undated) DEVICE — LINER GLOVE POWDERFREE 8

## (undated) DEVICE — CATH IV 20GAX1.25 JELCO

## (undated) DEVICE — SUT VICRYL 2-0 27 CT-1

## (undated) DEVICE — GLOVE 8 PROTEXIS PI BLUE

## (undated) DEVICE — SUT SILK 0 SUTUPAK SA86H